# Patient Record
Sex: FEMALE | Race: OTHER | Employment: UNEMPLOYED | URBAN - METROPOLITAN AREA
[De-identification: names, ages, dates, MRNs, and addresses within clinical notes are randomized per-mention and may not be internally consistent; named-entity substitution may affect disease eponyms.]

---

## 2019-02-21 ENCOUNTER — HOSPITAL ENCOUNTER (EMERGENCY)
Age: 64
Discharge: OTHER INSTITUTION | End: 2019-02-22
Attending: EMERGENCY MEDICINE
Payer: MEDICAID

## 2019-02-21 DIAGNOSIS — F20.0 PARANOID SCHIZOPHRENIA (HCC): Primary | ICD-10-CM

## 2019-02-21 LAB
ACETAMINOPHEN LEVEL: <5 UG/ML (ref 10–30)
ALBUMIN SERPL-MCNC: 3.9 G/DL (ref 3.5–4.6)
ALP BLD-CCNC: 149 U/L (ref 40–130)
ALT SERPL-CCNC: 24 U/L (ref 0–33)
AMPHETAMINE SCREEN, URINE: ABNORMAL
ANION GAP SERPL CALCULATED.3IONS-SCNC: 13 MEQ/L (ref 9–15)
AST SERPL-CCNC: 22 U/L (ref 0–35)
BARBITURATE SCREEN URINE: ABNORMAL
BASOPHILS ABSOLUTE: 0.1 K/UL (ref 0–0.2)
BASOPHILS RELATIVE PERCENT: 0.7 %
BENZODIAZEPINE SCREEN, URINE: POSITIVE
BILIRUB SERPL-MCNC: <0.2 MG/DL (ref 0.2–0.7)
BILIRUBIN URINE: NEGATIVE
BLOOD, URINE: NEGATIVE
BUN BLDV-MCNC: 29 MG/DL (ref 8–23)
CALCIUM SERPL-MCNC: 9.7 MG/DL (ref 8.5–9.9)
CANNABINOID SCREEN URINE: ABNORMAL
CHLORIDE BLD-SCNC: 98 MEQ/L (ref 95–107)
CLARITY: CLEAR
CO2: 24 MEQ/L (ref 20–31)
COCAINE METABOLITE SCREEN URINE: ABNORMAL
COLOR: YELLOW
CREAT SERPL-MCNC: 0.79 MG/DL (ref 0.5–0.9)
EKG ATRIAL RATE: 82 BPM
EKG P AXIS: 21 DEGREES
EKG P-R INTERVAL: 200 MS
EKG Q-T INTERVAL: 380 MS
EKG QRS DURATION: 88 MS
EKG QTC CALCULATION (BAZETT): 443 MS
EKG R AXIS: 20 DEGREES
EKG T AXIS: 2 DEGREES
EKG VENTRICULAR RATE: 82 BPM
EOSINOPHILS ABSOLUTE: 0.3 K/UL (ref 0–0.7)
EOSINOPHILS RELATIVE PERCENT: 3.2 %
ETHANOL PERCENT: NORMAL G/DL
ETHANOL: <10 MG/DL (ref 0–0.08)
GFR AFRICAN AMERICAN: >60
GFR NON-AFRICAN AMERICAN: >60
GLOBULIN: 3.9 G/DL (ref 2.3–3.5)
GLUCOSE BLD-MCNC: 170 MG/DL (ref 70–99)
GLUCOSE URINE: 250 MG/DL
HCT VFR BLD CALC: 37.1 % (ref 37–47)
HEMOGLOBIN: 12.5 G/DL (ref 12–16)
KETONES, URINE: NEGATIVE MG/DL
LEUKOCYTE ESTERASE, URINE: NEGATIVE
LYMPHOCYTES ABSOLUTE: 3.9 K/UL (ref 1–4.8)
LYMPHOCYTES RELATIVE PERCENT: 36.1 %
Lab: ABNORMAL
MCH RBC QN AUTO: 25.2 PG (ref 27–31.3)
MCHC RBC AUTO-ENTMCNC: 33.8 % (ref 33–37)
MCV RBC AUTO: 74.3 FL (ref 82–100)
MONOCYTES ABSOLUTE: 0.7 K/UL (ref 0.2–0.8)
MONOCYTES RELATIVE PERCENT: 6.2 %
NEUTROPHILS ABSOLUTE: 5.8 K/UL (ref 1.4–6.5)
NEUTROPHILS RELATIVE PERCENT: 53.8 %
NITRITE, URINE: NEGATIVE
OPIATE SCREEN URINE: ABNORMAL
PDW BLD-RTO: 17.7 % (ref 11.5–14.5)
PH UA: 6 (ref 5–9)
PHENCYCLIDINE SCREEN URINE: ABNORMAL
PLATELET # BLD: 255 K/UL (ref 130–400)
POTASSIUM SERPL-SCNC: 5.1 MEQ/L (ref 3.4–4.9)
PROTEIN UA: NEGATIVE MG/DL
RBC # BLD: 4.99 M/UL (ref 4.2–5.4)
SALICYLATE, SERUM: <0.3 MG/DL (ref 15–30)
SODIUM BLD-SCNC: 135 MEQ/L (ref 135–144)
SPECIFIC GRAVITY UA: 1.02 (ref 1–1.03)
TOTAL CK: 71 U/L (ref 0–170)
TOTAL PROTEIN: 7.8 G/DL (ref 6.3–8)
URINE REFLEX TO CULTURE: ABNORMAL
UROBILINOGEN, URINE: 0.2 E.U./DL
WBC # BLD: 10.7 K/UL (ref 4.8–10.8)

## 2019-02-21 PROCEDURE — G0480 DRUG TEST DEF 1-7 CLASSES: HCPCS

## 2019-02-21 PROCEDURE — 80307 DRUG TEST PRSMV CHEM ANLYZR: CPT

## 2019-02-21 PROCEDURE — 96372 THER/PROPH/DIAG INJ SC/IM: CPT

## 2019-02-21 PROCEDURE — 82550 ASSAY OF CK (CPK): CPT

## 2019-02-21 PROCEDURE — 93005 ELECTROCARDIOGRAM TRACING: CPT

## 2019-02-21 PROCEDURE — 81003 URINALYSIS AUTO W/O SCOPE: CPT

## 2019-02-21 PROCEDURE — 85025 COMPLETE CBC W/AUTO DIFF WBC: CPT

## 2019-02-21 PROCEDURE — 36415 COLL VENOUS BLD VENIPUNCTURE: CPT

## 2019-02-21 PROCEDURE — 6360000002 HC RX W HCPCS: Performed by: EMERGENCY MEDICINE

## 2019-02-21 PROCEDURE — 80053 COMPREHEN METABOLIC PANEL: CPT

## 2019-02-21 PROCEDURE — 99285 EMERGENCY DEPT VISIT HI MDM: CPT

## 2019-02-21 RX ORDER — CLONAZEPAM 1 MG/1
1 TABLET ORAL 3 TIMES DAILY PRN
Status: ON HOLD | COMMUNITY
End: 2019-02-28 | Stop reason: HOSPADM

## 2019-02-21 RX ORDER — QUETIAPINE FUMARATE 50 MG/1
50 TABLET, FILM COATED ORAL
Status: ON HOLD | COMMUNITY
End: 2019-02-28 | Stop reason: HOSPADM

## 2019-02-21 RX ORDER — LORAZEPAM 2 MG/ML
1.5 INJECTION INTRAMUSCULAR ONCE
Status: COMPLETED | OUTPATIENT
Start: 2019-02-21 | End: 2019-02-21

## 2019-02-21 RX ORDER — METOPROLOL SUCCINATE 50 MG/1
50 TABLET, EXTENDED RELEASE ORAL DAILY
Status: ON HOLD | COMMUNITY
End: 2019-02-25 | Stop reason: HOSPADM

## 2019-02-21 RX ORDER — SIMVASTATIN 40 MG
40 TABLET ORAL DAILY
COMMUNITY
End: 2019-09-20

## 2019-02-21 RX ORDER — QUETIAPINE FUMARATE 200 MG/1
200 TABLET, FILM COATED ORAL
Status: ON HOLD | COMMUNITY
End: 2019-02-28 | Stop reason: HOSPADM

## 2019-02-21 RX ORDER — ACETAMINOPHEN AND CODEINE PHOSPHATE 120; 12 MG/5ML; MG/5ML
30 SOLUTION ORAL NIGHTLY PRN
Status: ON HOLD | COMMUNITY
End: 2019-02-28 | Stop reason: HOSPADM

## 2019-02-21 RX ORDER — LEVOTHYROXINE SODIUM 0.05 MG/1
50 TABLET ORAL DAILY
COMMUNITY
End: 2020-09-21 | Stop reason: SDUPTHER

## 2019-02-21 RX ORDER — LISINOPRIL 20 MG/1
20 TABLET ORAL 2 TIMES DAILY
Status: ON HOLD | COMMUNITY
End: 2019-02-25 | Stop reason: HOSPADM

## 2019-02-21 RX ORDER — DILTIAZEM HYDROCHLORIDE 240 MG/1
240 CAPSULE, EXTENDED RELEASE ORAL DAILY
Status: ON HOLD | COMMUNITY
End: 2019-02-25 | Stop reason: HOSPADM

## 2019-02-21 RX ORDER — SERTRALINE HYDROCHLORIDE 100 MG/1
100 TABLET, FILM COATED ORAL DAILY
Status: ON HOLD | COMMUNITY
End: 2019-02-28 | Stop reason: HOSPADM

## 2019-02-21 RX ORDER — INSULIN GLARGINE 100 [IU]/ML
50 INJECTION, SOLUTION SUBCUTANEOUS NIGHTLY
Status: ON HOLD | COMMUNITY
End: 2019-02-25 | Stop reason: HOSPADM

## 2019-02-21 RX ORDER — FUROSEMIDE 40 MG/1
40 TABLET ORAL DAILY
Status: ON HOLD | COMMUNITY
End: 2020-09-04 | Stop reason: HOSPADM

## 2019-02-21 RX ORDER — MECLIZINE HYDROCHLORIDE 25 MG/1
25 TABLET ORAL 2 TIMES DAILY
COMMUNITY

## 2019-02-21 RX ORDER — MONTELUKAST SODIUM 10 MG/1
10 TABLET ORAL DAILY
COMMUNITY
End: 2019-09-20

## 2019-02-21 RX ADMIN — LORAZEPAM 1.5 MG: 2 INJECTION INTRAMUSCULAR; INTRAVENOUS at 21:56

## 2019-02-21 ASSESSMENT — ENCOUNTER SYMPTOMS
ABDOMINAL PAIN: 0
CHEST TIGHTNESS: 0
SORE THROAT: 0
SHORTNESS OF BREATH: 0
NAUSEA: 0
EYE PAIN: 0
VOMITING: 0

## 2019-02-22 ENCOUNTER — HOSPITAL ENCOUNTER (INPATIENT)
Age: 64
LOS: 7 days | Discharge: HOME | DRG: 751 | End: 2019-03-01
Attending: PSYCHIATRY & NEUROLOGY | Admitting: PSYCHIATRY & NEUROLOGY
Payer: MEDICAID

## 2019-02-22 VITALS
BODY MASS INDEX: 35.85 KG/M2 | TEMPERATURE: 98.2 F | HEIGHT: 64 IN | WEIGHT: 210 LBS | OXYGEN SATURATION: 97 % | HEART RATE: 97 BPM | DIASTOLIC BLOOD PRESSURE: 48 MMHG | RESPIRATION RATE: 18 BRPM | SYSTOLIC BLOOD PRESSURE: 104 MMHG

## 2019-02-22 DIAGNOSIS — F32.3 SEVERE MAJOR DEPRESSION WITH PSYCHOTIC FEATURES (HCC): Primary | ICD-10-CM

## 2019-02-22 LAB
CHP ED QC CHECK: YES
CHP ED QC CHECK: YES
GLUCOSE BLD-MCNC: 160 MG/DL (ref 60–115)
GLUCOSE BLD-MCNC: 190 MG/DL (ref 60–115)
GLUCOSE BLD-MCNC: 297 MG/DL (ref 60–115)
GLUCOSE BLD-MCNC: 302 MG/DL
GLUCOSE BLD-MCNC: 302 MG/DL (ref 60–115)
GLUCOSE BLD-MCNC: 310 MG/DL (ref 60–115)
METER GLUCOSE: 183 MG/DL (ref 74–99)
METER GLUCOSE: 258 MG/DL (ref 74–99)
METER GLUCOSE: 273 MG/DL (ref 74–99)
PERFORMED ON: ABNORMAL

## 2019-02-22 PROCEDURE — 6370000000 HC RX 637 (ALT 250 FOR IP): Performed by: EMERGENCY MEDICINE

## 2019-02-22 PROCEDURE — 6370000000 HC RX 637 (ALT 250 FOR IP): Performed by: NURSE PRACTITIONER

## 2019-02-22 PROCEDURE — 1240000000 HC EMOTIONAL WELLNESS R&B

## 2019-02-22 PROCEDURE — 96372 THER/PROPH/DIAG INJ SC/IM: CPT

## 2019-02-22 PROCEDURE — 2500000003 HC RX 250 WO HCPCS: Performed by: EMERGENCY MEDICINE

## 2019-02-22 PROCEDURE — 6370000000 HC RX 637 (ALT 250 FOR IP): Performed by: FAMILY MEDICINE

## 2019-02-22 PROCEDURE — 6360000002 HC RX W HCPCS: Performed by: EMERGENCY MEDICINE

## 2019-02-22 PROCEDURE — 82962 GLUCOSE BLOOD TEST: CPT

## 2019-02-22 RX ORDER — MAGNESIUM HYDROXIDE/ALUMINUM HYDROXICE/SIMETHICONE 120; 1200; 1200 MG/30ML; MG/30ML; MG/30ML
30 SUSPENSION ORAL PRN
Status: DISCONTINUED | OUTPATIENT
Start: 2019-02-22 | End: 2019-03-01 | Stop reason: HOSPADM

## 2019-02-22 RX ORDER — LEVOTHYROXINE SODIUM 0.05 MG/1
50 TABLET ORAL DAILY
Status: DISCONTINUED | OUTPATIENT
Start: 2019-02-22 | End: 2019-03-01 | Stop reason: HOSPADM

## 2019-02-22 RX ORDER — HALOPERIDOL 5 MG/ML
2 INJECTION INTRAMUSCULAR EVERY 4 HOURS PRN
Status: DISCONTINUED | OUTPATIENT
Start: 2019-02-22 | End: 2019-03-01 | Stop reason: HOSPADM

## 2019-02-22 RX ORDER — MECLIZINE HCL 12.5 MG/1
25 TABLET ORAL 3 TIMES DAILY
Status: DISCONTINUED | OUTPATIENT
Start: 2019-02-22 | End: 2019-03-01 | Stop reason: HOSPADM

## 2019-02-22 RX ORDER — METOPROLOL SUCCINATE 50 MG/1
50 TABLET, EXTENDED RELEASE ORAL DAILY
Status: DISCONTINUED | OUTPATIENT
Start: 2019-02-22 | End: 2019-03-01 | Stop reason: HOSPADM

## 2019-02-22 RX ORDER — ACETAMINOPHEN 325 MG/1
650 TABLET ORAL EVERY 4 HOURS PRN
Status: DISCONTINUED | OUTPATIENT
Start: 2019-02-22 | End: 2019-03-01 | Stop reason: HOSPADM

## 2019-02-22 RX ORDER — SERTRALINE HYDROCHLORIDE 100 MG/1
100 TABLET, FILM COATED ORAL DAILY
Status: DISCONTINUED | OUTPATIENT
Start: 2019-02-22 | End: 2019-02-23

## 2019-02-22 RX ORDER — SIMVASTATIN 40 MG
40 TABLET ORAL DAILY
Status: DISCONTINUED | OUTPATIENT
Start: 2019-02-22 | End: 2019-03-01 | Stop reason: HOSPADM

## 2019-02-22 RX ORDER — LORAZEPAM 2 MG/ML
2 INJECTION INTRAMUSCULAR ONCE
Status: COMPLETED | OUTPATIENT
Start: 2019-02-22 | End: 2019-02-22

## 2019-02-22 RX ORDER — DEXTROSE MONOHYDRATE 25 G/50ML
12.5 INJECTION, SOLUTION INTRAVENOUS PRN
Status: DISCONTINUED | OUTPATIENT
Start: 2019-02-22 | End: 2019-03-01 | Stop reason: HOSPADM

## 2019-02-22 RX ORDER — DEXTROSE MONOHYDRATE 50 MG/ML
100 INJECTION, SOLUTION INTRAVENOUS PRN
Status: DISCONTINUED | OUTPATIENT
Start: 2019-02-22 | End: 2019-03-01 | Stop reason: HOSPADM

## 2019-02-22 RX ORDER — MONTELUKAST SODIUM 10 MG/1
10 TABLET ORAL DAILY
Status: DISCONTINUED | OUTPATIENT
Start: 2019-02-22 | End: 2019-03-01 | Stop reason: HOSPADM

## 2019-02-22 RX ORDER — OLANZAPINE 10 MG/1
5 INJECTION, POWDER, LYOPHILIZED, FOR SOLUTION INTRAMUSCULAR
Status: COMPLETED | OUTPATIENT
Start: 2019-02-22 | End: 2019-02-22

## 2019-02-22 RX ORDER — LORAZEPAM 0.5 MG/1
0.5 TABLET ORAL EVERY 8 HOURS PRN
Status: DISCONTINUED | OUTPATIENT
Start: 2019-02-22 | End: 2019-02-25 | Stop reason: ALTCHOICE

## 2019-02-22 RX ORDER — METOPROLOL SUCCINATE 25 MG/1
25 TABLET, EXTENDED RELEASE ORAL DAILY
Status: DISCONTINUED | OUTPATIENT
Start: 2019-02-22 | End: 2019-03-01 | Stop reason: HOSPADM

## 2019-02-22 RX ORDER — QUETIAPINE FUMARATE 200 MG/1
200 TABLET, FILM COATED ORAL NIGHTLY
Status: DISCONTINUED | OUTPATIENT
Start: 2019-02-22 | End: 2019-02-27

## 2019-02-22 RX ORDER — METOPROLOL SUCCINATE 25 MG/1
50 TABLET, EXTENDED RELEASE ORAL DAILY
Status: DISCONTINUED | OUTPATIENT
Start: 2019-02-22 | End: 2019-02-22

## 2019-02-22 RX ORDER — INSULIN GLARGINE 100 [IU]/ML
30 INJECTION, SOLUTION SUBCUTANEOUS NIGHTLY
Status: CANCELLED | OUTPATIENT
Start: 2019-02-22

## 2019-02-22 RX ORDER — FUROSEMIDE 40 MG/1
40 TABLET ORAL 2 TIMES DAILY
Status: DISCONTINUED | OUTPATIENT
Start: 2019-02-22 | End: 2019-03-01 | Stop reason: HOSPADM

## 2019-02-22 RX ORDER — NICOTINE POLACRILEX 4 MG
15 LOZENGE BUCCAL PRN
Status: DISCONTINUED | OUTPATIENT
Start: 2019-02-22 | End: 2019-03-01 | Stop reason: HOSPADM

## 2019-02-22 RX ORDER — DILTIAZEM HYDROCHLORIDE 120 MG/1
240 CAPSULE, COATED, EXTENDED RELEASE ORAL DAILY
Status: DISCONTINUED | OUTPATIENT
Start: 2019-02-22 | End: 2019-02-22

## 2019-02-22 RX ADMIN — SERTRALINE 100 MG: 100 TABLET, FILM COATED ORAL at 14:58

## 2019-02-22 RX ADMIN — SIMVASTATIN 40 MG: 40 TABLET, FILM COATED ORAL at 14:58

## 2019-02-22 RX ADMIN — MONTELUKAST SODIUM 10 MG: 10 TABLET, FILM COATED ORAL at 14:58

## 2019-02-22 RX ADMIN — MECLIZINE 25 MG: 12.5 TABLET ORAL at 14:57

## 2019-02-22 RX ADMIN — METOPROLOL SUCCINATE 50 MG: 50 TABLET, EXTENDED RELEASE ORAL at 18:24

## 2019-02-22 RX ADMIN — METOPROLOL SUCCINATE 25 MG: 25 TABLET, EXTENDED RELEASE ORAL at 18:24

## 2019-02-22 RX ADMIN — QUETIAPINE FUMARATE 200 MG: 200 TABLET ORAL at 21:15

## 2019-02-22 RX ADMIN — INSULIN HUMAN 10 UNITS: 100 INJECTION, SOLUTION PARENTERAL at 02:15

## 2019-02-22 RX ADMIN — LORAZEPAM 2 MG: 2 INJECTION INTRAMUSCULAR; INTRAVENOUS at 02:24

## 2019-02-22 RX ADMIN — MECLIZINE 25 MG: 12.5 TABLET ORAL at 21:15

## 2019-02-22 RX ADMIN — LEVOTHYROXINE SODIUM 50 MCG: 50 TABLET ORAL at 14:58

## 2019-02-22 RX ADMIN — OLANZAPINE 5 MG: 10 INJECTION, POWDER, LYOPHILIZED, FOR SOLUTION INTRAMUSCULAR at 02:23

## 2019-02-22 RX ADMIN — INSULIN LISPRO 3 UNITS: 100 INJECTION, SOLUTION INTRAVENOUS; SUBCUTANEOUS at 18:01

## 2019-02-22 ASSESSMENT — SLEEP AND FATIGUE QUESTIONNAIRES
DIFFICULTY STAYING ASLEEP: YES
DO YOU HAVE DIFFICULTY SLEEPING: YES
AVERAGE NUMBER OF SLEEP HOURS: 2
RESTFUL SLEEP: NO
DIFFICULTY FALLING ASLEEP: YES
SLEEP PATTERN: DIFFICULTY FALLING ASLEEP;DIFFICULTY ARISING;INSOMNIA;RESTLESSNESS;NIGHTMARES/TERRORS
DO YOU USE A SLEEP AID: NO
DIFFICULTY ARISING: YES

## 2019-02-22 ASSESSMENT — LIFESTYLE VARIABLES: HISTORY_ALCOHOL_USE: NO

## 2019-02-22 ASSESSMENT — PAIN SCALES - GENERAL: PAINLEVEL_OUTOF10: 0

## 2019-02-22 ASSESSMENT — PAIN - FUNCTIONAL ASSESSMENT: PAIN_FUNCTIONAL_ASSESSMENT: 0-10

## 2019-02-23 LAB
ANION GAP SERPL CALCULATED.3IONS-SCNC: 9 MMOL/L (ref 7–16)
BUN BLDV-MCNC: 24 MG/DL (ref 8–23)
CALCIUM SERPL-MCNC: 9.3 MG/DL (ref 8.6–10.2)
CHLORIDE BLD-SCNC: 102 MMOL/L (ref 98–107)
CO2: 26 MMOL/L (ref 22–29)
CREAT SERPL-MCNC: 1.1 MG/DL (ref 0.5–1)
GFR AFRICAN AMERICAN: >60
GFR NON-AFRICAN AMERICAN: 50 ML/MIN/1.73
GLUCOSE BLD-MCNC: 314 MG/DL (ref 74–99)
HBA1C MFR BLD: 8.5 % (ref 4–5.6)
METER GLUCOSE: 104 MG/DL (ref 74–99)
METER GLUCOSE: 188 MG/DL (ref 74–99)
METER GLUCOSE: 206 MG/DL (ref 74–99)
METER GLUCOSE: 212 MG/DL (ref 74–99)
POTASSIUM REFLEX MAGNESIUM: 5 MMOL/L (ref 3.5–5)
SODIUM BLD-SCNC: 137 MMOL/L (ref 132–146)

## 2019-02-23 PROCEDURE — 83036 HEMOGLOBIN GLYCOSYLATED A1C: CPT

## 2019-02-23 PROCEDURE — 80048 BASIC METABOLIC PNL TOTAL CA: CPT

## 2019-02-23 PROCEDURE — 6370000000 HC RX 637 (ALT 250 FOR IP): Performed by: NURSE PRACTITIONER

## 2019-02-23 PROCEDURE — 6370000000 HC RX 637 (ALT 250 FOR IP): Performed by: FAMILY MEDICINE

## 2019-02-23 PROCEDURE — 1240000000 HC EMOTIONAL WELLNESS R&B

## 2019-02-23 PROCEDURE — 36415 COLL VENOUS BLD VENIPUNCTURE: CPT

## 2019-02-23 PROCEDURE — 99222 1ST HOSP IP/OBS MODERATE 55: CPT | Performed by: NURSE PRACTITIONER

## 2019-02-23 PROCEDURE — 82962 GLUCOSE BLOOD TEST: CPT

## 2019-02-23 RX ORDER — QUETIAPINE FUMARATE 25 MG/1
50 TABLET, FILM COATED ORAL NIGHTLY
Status: DISCONTINUED | OUTPATIENT
Start: 2019-02-23 | End: 2019-02-26

## 2019-02-23 RX ORDER — QUETIAPINE FUMARATE 25 MG/1
25 TABLET, FILM COATED ORAL DAILY
Status: DISCONTINUED | OUTPATIENT
Start: 2019-02-23 | End: 2019-02-26

## 2019-02-23 RX ORDER — INSULIN GLARGINE 100 [IU]/ML
30 INJECTION, SOLUTION SUBCUTANEOUS DAILY
Status: DISCONTINUED | OUTPATIENT
Start: 2019-02-23 | End: 2019-02-24

## 2019-02-23 RX ADMIN — SERTRALINE 100 MG: 100 TABLET, FILM COATED ORAL at 08:57

## 2019-02-23 RX ADMIN — QUETIAPINE FUMARATE 50 MG: 25 TABLET ORAL at 20:34

## 2019-02-23 RX ADMIN — METOPROLOL SUCCINATE 25 MG: 25 TABLET, EXTENDED RELEASE ORAL at 08:56

## 2019-02-23 RX ADMIN — SIMVASTATIN 40 MG: 40 TABLET, FILM COATED ORAL at 08:57

## 2019-02-23 RX ADMIN — METFORMIN HYDROCHLORIDE 1000 MG: 1000 TABLET ORAL at 08:56

## 2019-02-23 RX ADMIN — MONTELUKAST SODIUM 10 MG: 10 TABLET, FILM COATED ORAL at 08:56

## 2019-02-23 RX ADMIN — MECLIZINE 25 MG: 12.5 TABLET ORAL at 08:57

## 2019-02-23 RX ADMIN — INSULIN GLARGINE 30 UNITS: 100 INJECTION, SOLUTION SUBCUTANEOUS at 10:03

## 2019-02-23 RX ADMIN — INSULIN LISPRO 2 UNITS: 100 INJECTION, SOLUTION INTRAVENOUS; SUBCUTANEOUS at 12:42

## 2019-02-23 RX ADMIN — FUROSEMIDE 40 MG: 40 TABLET ORAL at 08:56

## 2019-02-23 RX ADMIN — MECLIZINE 25 MG: 12.5 TABLET ORAL at 15:54

## 2019-02-23 RX ADMIN — LEVOTHYROXINE SODIUM 50 MCG: 50 TABLET ORAL at 06:24

## 2019-02-23 RX ADMIN — INSULIN LISPRO 5 UNITS: 100 INJECTION, SOLUTION INTRAVENOUS; SUBCUTANEOUS at 12:42

## 2019-02-23 RX ADMIN — INSULIN LISPRO 1 UNITS: 100 INJECTION, SOLUTION INTRAVENOUS; SUBCUTANEOUS at 08:38

## 2019-02-23 RX ADMIN — MECLIZINE 25 MG: 12.5 TABLET ORAL at 20:34

## 2019-02-23 RX ADMIN — METOPROLOL SUCCINATE 50 MG: 50 TABLET, EXTENDED RELEASE ORAL at 08:57

## 2019-02-23 RX ADMIN — QUETIAPINE FUMARATE 200 MG: 200 TABLET ORAL at 20:34

## 2019-02-23 RX ADMIN — METFORMIN HYDROCHLORIDE 1000 MG: 1000 TABLET ORAL at 17:51

## 2019-02-23 RX ADMIN — QUETIAPINE FUMARATE 25 MG: 25 TABLET ORAL at 12:21

## 2019-02-23 ASSESSMENT — SLEEP AND FATIGUE QUESTIONNAIRES
DIFFICULTY STAYING ASLEEP: YES
SLEEP PATTERN: DIFFICULTY FALLING ASLEEP;DIFFICULTY ARISING;INSOMNIA;RESTLESSNESS;NIGHTMARES/TERRORS
DIFFICULTY ARISING: YES
DO YOU USE A SLEEP AID: NO
DIFFICULTY FALLING ASLEEP: YES
AVERAGE NUMBER OF SLEEP HOURS: 2
DO YOU HAVE DIFFICULTY SLEEPING: YES
RESTFUL SLEEP: NO

## 2019-02-23 ASSESSMENT — PAIN SCALES - GENERAL
PAINLEVEL_OUTOF10: 0
PAINLEVEL_OUTOF10: 0

## 2019-02-23 ASSESSMENT — LIFESTYLE VARIABLES: HISTORY_ALCOHOL_USE: NO

## 2019-02-24 LAB
METER GLUCOSE: 112 MG/DL (ref 74–99)
METER GLUCOSE: 136 MG/DL (ref 74–99)
METER GLUCOSE: 162 MG/DL (ref 74–99)
METER GLUCOSE: 183 MG/DL (ref 74–99)
METER GLUCOSE: 81 MG/DL (ref 74–99)
METER GLUCOSE: 87 MG/DL (ref 74–99)

## 2019-02-24 PROCEDURE — 6370000000 HC RX 637 (ALT 250 FOR IP): Performed by: FAMILY MEDICINE

## 2019-02-24 PROCEDURE — 82962 GLUCOSE BLOOD TEST: CPT

## 2019-02-24 PROCEDURE — 1240000000 HC EMOTIONAL WELLNESS R&B

## 2019-02-24 PROCEDURE — 99231 SBSQ HOSP IP/OBS SF/LOW 25: CPT | Performed by: NURSE PRACTITIONER

## 2019-02-24 PROCEDURE — 6370000000 HC RX 637 (ALT 250 FOR IP): Performed by: NURSE PRACTITIONER

## 2019-02-24 RX ORDER — INSULIN GLARGINE 100 [IU]/ML
35 INJECTION, SOLUTION SUBCUTANEOUS DAILY
Status: DISCONTINUED | OUTPATIENT
Start: 2019-02-25 | End: 2019-03-01 | Stop reason: HOSPADM

## 2019-02-24 RX ADMIN — METOPROLOL SUCCINATE 50 MG: 50 TABLET, EXTENDED RELEASE ORAL at 09:11

## 2019-02-24 RX ADMIN — QUETIAPINE FUMARATE 50 MG: 25 TABLET ORAL at 21:20

## 2019-02-24 RX ADMIN — QUETIAPINE FUMARATE 25 MG: 25 TABLET ORAL at 09:11

## 2019-02-24 RX ADMIN — INSULIN GLARGINE 30 UNITS: 100 INJECTION, SOLUTION SUBCUTANEOUS at 10:35

## 2019-02-24 RX ADMIN — MECLIZINE 25 MG: 12.5 TABLET ORAL at 21:20

## 2019-02-24 RX ADMIN — METFORMIN HYDROCHLORIDE 1000 MG: 1000 TABLET ORAL at 09:10

## 2019-02-24 RX ADMIN — METFORMIN HYDROCHLORIDE 1000 MG: 1000 TABLET ORAL at 18:09

## 2019-02-24 RX ADMIN — MONTELUKAST SODIUM 10 MG: 10 TABLET, FILM COATED ORAL at 09:11

## 2019-02-24 RX ADMIN — FUROSEMIDE 40 MG: 40 TABLET ORAL at 18:09

## 2019-02-24 RX ADMIN — MECLIZINE 25 MG: 12.5 TABLET ORAL at 09:10

## 2019-02-24 RX ADMIN — INSULIN LISPRO 5 UNITS: 100 INJECTION, SOLUTION INTRAVENOUS; SUBCUTANEOUS at 10:36

## 2019-02-24 RX ADMIN — INSULIN LISPRO 1 UNITS: 100 INJECTION, SOLUTION INTRAVENOUS; SUBCUTANEOUS at 10:35

## 2019-02-24 RX ADMIN — LEVOTHYROXINE SODIUM 50 MCG: 50 TABLET ORAL at 06:49

## 2019-02-24 RX ADMIN — SIMVASTATIN 40 MG: 40 TABLET, FILM COATED ORAL at 09:11

## 2019-02-24 RX ADMIN — QUETIAPINE FUMARATE 200 MG: 200 TABLET ORAL at 21:19

## 2019-02-24 RX ADMIN — SERTRALINE 50 MG: 50 TABLET, FILM COATED ORAL at 09:10

## 2019-02-24 RX ADMIN — MECLIZINE 25 MG: 12.5 TABLET ORAL at 13:39

## 2019-02-24 ASSESSMENT — PAIN SCALES - GENERAL: PAINLEVEL_OUTOF10: 0

## 2019-02-25 PROBLEM — Z79.4 TYPE 2 DIABETES MELLITUS WITH HYPERGLYCEMIA, WITH LONG-TERM CURRENT USE OF INSULIN (HCC): Status: ACTIVE | Noted: 2019-02-25

## 2019-02-25 PROBLEM — I10 HTN (HYPERTENSION): Status: ACTIVE | Noted: 2019-02-25

## 2019-02-25 PROBLEM — E78.5 HLD (HYPERLIPIDEMIA): Status: ACTIVE | Noted: 2019-02-25

## 2019-02-25 PROBLEM — I50.9 HF (HEART FAILURE) (HCC): Status: ACTIVE | Noted: 2019-02-25

## 2019-02-25 PROBLEM — E11.65 TYPE 2 DIABETES MELLITUS WITH HYPERGLYCEMIA, WITH LONG-TERM CURRENT USE OF INSULIN (HCC): Status: ACTIVE | Noted: 2019-02-25

## 2019-02-25 PROBLEM — E03.9 HYPOTHYROID: Status: ACTIVE | Noted: 2019-02-25

## 2019-02-25 LAB
METER GLUCOSE: 132 MG/DL (ref 74–99)
METER GLUCOSE: 132 MG/DL (ref 74–99)
METER GLUCOSE: 90 MG/DL (ref 74–99)

## 2019-02-25 PROCEDURE — 82962 GLUCOSE BLOOD TEST: CPT

## 2019-02-25 PROCEDURE — 6370000000 HC RX 637 (ALT 250 FOR IP): Performed by: NURSE PRACTITIONER

## 2019-02-25 PROCEDURE — 1240000000 HC EMOTIONAL WELLNESS R&B

## 2019-02-25 PROCEDURE — 99231 SBSQ HOSP IP/OBS SF/LOW 25: CPT | Performed by: NURSE PRACTITIONER

## 2019-02-25 PROCEDURE — 6370000000 HC RX 637 (ALT 250 FOR IP): Performed by: FAMILY MEDICINE

## 2019-02-25 RX ORDER — METOPROLOL SUCCINATE 25 MG/1
25 TABLET, EXTENDED RELEASE ORAL DAILY
Qty: 30 TABLET | Refills: 3 | Status: ON HOLD | OUTPATIENT
Start: 2019-02-25 | End: 2020-02-11

## 2019-02-25 RX ORDER — LORAZEPAM 0.5 MG/1
0.5 TABLET ORAL EVERY 4 HOURS PRN
Status: DISCONTINUED | OUTPATIENT
Start: 2019-02-25 | End: 2019-02-26

## 2019-02-25 RX ORDER — METOPROLOL SUCCINATE 50 MG/1
50 TABLET, EXTENDED RELEASE ORAL DAILY
Qty: 30 TABLET | Refills: 3 | Status: ON HOLD | OUTPATIENT
Start: 2019-02-25 | End: 2020-02-26

## 2019-02-25 RX ORDER — INSULIN GLARGINE 100 [IU]/ML
35 INJECTION, SOLUTION SUBCUTANEOUS DAILY
Qty: 1 VIAL | Refills: 3 | Status: SHIPPED | OUTPATIENT
Start: 2019-02-25 | End: 2020-01-02 | Stop reason: SDUPTHER

## 2019-02-25 RX ADMIN — SERTRALINE 50 MG: 50 TABLET, FILM COATED ORAL at 08:48

## 2019-02-25 RX ADMIN — FUROSEMIDE 40 MG: 40 TABLET ORAL at 17:45

## 2019-02-25 RX ADMIN — MECLIZINE 25 MG: 12.5 TABLET ORAL at 08:48

## 2019-02-25 RX ADMIN — SIMVASTATIN 40 MG: 40 TABLET, FILM COATED ORAL at 08:47

## 2019-02-25 RX ADMIN — METFORMIN HYDROCHLORIDE 1000 MG: 1000 TABLET ORAL at 17:45

## 2019-02-25 RX ADMIN — MONTELUKAST SODIUM 10 MG: 10 TABLET, FILM COATED ORAL at 08:47

## 2019-02-25 RX ADMIN — INSULIN GLARGINE 35 UNITS: 100 INJECTION, SOLUTION SUBCUTANEOUS at 08:52

## 2019-02-25 RX ADMIN — METOPROLOL SUCCINATE 50 MG: 50 TABLET, EXTENDED RELEASE ORAL at 08:57

## 2019-02-25 RX ADMIN — MECLIZINE 25 MG: 12.5 TABLET ORAL at 14:14

## 2019-02-25 RX ADMIN — LORAZEPAM 0.5 MG: 0.5 TABLET ORAL at 05:30

## 2019-02-25 RX ADMIN — INSULIN LISPRO 5 UNITS: 100 INJECTION, SOLUTION INTRAVENOUS; SUBCUTANEOUS at 17:52

## 2019-02-25 RX ADMIN — INSULIN LISPRO 5 UNITS: 100 INJECTION, SOLUTION INTRAVENOUS; SUBCUTANEOUS at 08:53

## 2019-02-25 RX ADMIN — LEVOTHYROXINE SODIUM 50 MCG: 50 TABLET ORAL at 06:46

## 2019-02-25 RX ADMIN — MECLIZINE 25 MG: 12.5 TABLET ORAL at 21:03

## 2019-02-25 RX ADMIN — METOPROLOL SUCCINATE 25 MG: 25 TABLET, EXTENDED RELEASE ORAL at 08:57

## 2019-02-25 RX ADMIN — QUETIAPINE FUMARATE 25 MG: 25 TABLET ORAL at 08:48

## 2019-02-25 RX ADMIN — METFORMIN HYDROCHLORIDE 1000 MG: 1000 TABLET ORAL at 08:48

## 2019-02-25 ASSESSMENT — PAIN SCALES - GENERAL: PAINLEVEL_OUTOF10: 0

## 2019-02-26 LAB
METER GLUCOSE: 108 MG/DL (ref 74–99)
METER GLUCOSE: 123 MG/DL (ref 74–99)
METER GLUCOSE: 148 MG/DL (ref 74–99)
METER GLUCOSE: 86 MG/DL (ref 74–99)
METER GLUCOSE: 95 MG/DL (ref 74–99)

## 2019-02-26 PROCEDURE — 99231 SBSQ HOSP IP/OBS SF/LOW 25: CPT | Performed by: NURSE PRACTITIONER

## 2019-02-26 PROCEDURE — 6370000000 HC RX 637 (ALT 250 FOR IP): Performed by: NURSE PRACTITIONER

## 2019-02-26 PROCEDURE — 6370000000 HC RX 637 (ALT 250 FOR IP): Performed by: FAMILY MEDICINE

## 2019-02-26 PROCEDURE — 82962 GLUCOSE BLOOD TEST: CPT

## 2019-02-26 PROCEDURE — 1240000000 HC EMOTIONAL WELLNESS R&B

## 2019-02-26 RX ORDER — DIAZEPAM 2 MG/1
2 TABLET ORAL 3 TIMES DAILY
Status: DISCONTINUED | OUTPATIENT
Start: 2019-02-26 | End: 2019-02-27

## 2019-02-26 RX ADMIN — MECLIZINE 25 MG: 12.5 TABLET ORAL at 21:22

## 2019-02-26 RX ADMIN — MECLIZINE 25 MG: 12.5 TABLET ORAL at 09:10

## 2019-02-26 RX ADMIN — INSULIN GLARGINE 35 UNITS: 100 INJECTION, SOLUTION SUBCUTANEOUS at 09:16

## 2019-02-26 RX ADMIN — INSULIN LISPRO 5 UNITS: 100 INJECTION, SOLUTION INTRAVENOUS; SUBCUTANEOUS at 18:21

## 2019-02-26 RX ADMIN — DIAZEPAM 2 MG: 2 TABLET ORAL at 10:55

## 2019-02-26 RX ADMIN — MECLIZINE 25 MG: 12.5 TABLET ORAL at 14:29

## 2019-02-26 RX ADMIN — SIMVASTATIN 40 MG: 40 TABLET, FILM COATED ORAL at 09:11

## 2019-02-26 RX ADMIN — DIAZEPAM 2 MG: 2 TABLET ORAL at 21:22

## 2019-02-26 RX ADMIN — MONTELUKAST SODIUM 10 MG: 10 TABLET, FILM COATED ORAL at 09:11

## 2019-02-26 RX ADMIN — METOPROLOL SUCCINATE 25 MG: 25 TABLET, EXTENDED RELEASE ORAL at 09:11

## 2019-02-26 RX ADMIN — METOPROLOL SUCCINATE 50 MG: 50 TABLET, EXTENDED RELEASE ORAL at 09:13

## 2019-02-26 RX ADMIN — METFORMIN HYDROCHLORIDE 1000 MG: 1000 TABLET ORAL at 17:47

## 2019-02-26 RX ADMIN — QUETIAPINE FUMARATE 25 MG: 25 TABLET ORAL at 09:11

## 2019-02-26 RX ADMIN — METFORMIN HYDROCHLORIDE 1000 MG: 1000 TABLET ORAL at 09:10

## 2019-02-26 RX ADMIN — INSULIN LISPRO 5 UNITS: 100 INJECTION, SOLUTION INTRAVENOUS; SUBCUTANEOUS at 09:16

## 2019-02-26 RX ADMIN — LEVOTHYROXINE SODIUM 50 MCG: 50 TABLET ORAL at 06:14

## 2019-02-26 RX ADMIN — INSULIN LISPRO 1 UNITS: 100 INJECTION, SOLUTION INTRAVENOUS; SUBCUTANEOUS at 18:23

## 2019-02-26 RX ADMIN — SERTRALINE 50 MG: 50 TABLET, FILM COATED ORAL at 09:10

## 2019-02-26 ASSESSMENT — PAIN SCALES - GENERAL
PAINLEVEL_OUTOF10: 0
PAINLEVEL_OUTOF10: 0

## 2019-02-27 LAB
METER GLUCOSE: 102 MG/DL (ref 74–99)
METER GLUCOSE: 121 MG/DL (ref 74–99)
METER GLUCOSE: 147 MG/DL (ref 74–99)
METER GLUCOSE: 88 MG/DL (ref 74–99)

## 2019-02-27 PROCEDURE — 6370000000 HC RX 637 (ALT 250 FOR IP): Performed by: NURSE PRACTITIONER

## 2019-02-27 PROCEDURE — 1240000000 HC EMOTIONAL WELLNESS R&B

## 2019-02-27 PROCEDURE — 82962 GLUCOSE BLOOD TEST: CPT

## 2019-02-27 PROCEDURE — 6370000000 HC RX 637 (ALT 250 FOR IP): Performed by: FAMILY MEDICINE

## 2019-02-27 PROCEDURE — 99231 SBSQ HOSP IP/OBS SF/LOW 25: CPT | Performed by: NURSE PRACTITIONER

## 2019-02-27 RX ORDER — DIAZEPAM 5 MG/1
5 TABLET ORAL 3 TIMES DAILY
Status: DISCONTINUED | OUTPATIENT
Start: 2019-02-27 | End: 2019-03-01 | Stop reason: HOSPADM

## 2019-02-27 RX ADMIN — MECLIZINE 25 MG: 12.5 TABLET ORAL at 09:58

## 2019-02-27 RX ADMIN — LEVOTHYROXINE SODIUM 50 MCG: 50 TABLET ORAL at 06:12

## 2019-02-27 RX ADMIN — METOPROLOL SUCCINATE 50 MG: 50 TABLET, EXTENDED RELEASE ORAL at 09:58

## 2019-02-27 RX ADMIN — METOPROLOL SUCCINATE 25 MG: 25 TABLET, EXTENDED RELEASE ORAL at 09:58

## 2019-02-27 RX ADMIN — MECLIZINE 25 MG: 12.5 TABLET ORAL at 13:28

## 2019-02-27 RX ADMIN — MECLIZINE 25 MG: 12.5 TABLET ORAL at 20:37

## 2019-02-27 RX ADMIN — DIAZEPAM 5 MG: 5 TABLET ORAL at 13:28

## 2019-02-27 RX ADMIN — METFORMIN HYDROCHLORIDE 1000 MG: 1000 TABLET ORAL at 18:24

## 2019-02-27 RX ADMIN — SERTRALINE 50 MG: 50 TABLET, FILM COATED ORAL at 09:58

## 2019-02-27 RX ADMIN — INSULIN GLARGINE 35 UNITS: 100 INJECTION, SOLUTION SUBCUTANEOUS at 10:03

## 2019-02-27 RX ADMIN — SIMVASTATIN 40 MG: 40 TABLET, FILM COATED ORAL at 09:58

## 2019-02-27 RX ADMIN — DIAZEPAM 2 MG: 2 TABLET ORAL at 09:58

## 2019-02-27 RX ADMIN — METFORMIN HYDROCHLORIDE 1000 MG: 1000 TABLET ORAL at 09:59

## 2019-02-27 RX ADMIN — INSULIN LISPRO 5 UNITS: 100 INJECTION, SOLUTION INTRAVENOUS; SUBCUTANEOUS at 13:30

## 2019-02-27 RX ADMIN — INSULIN LISPRO 1 UNITS: 100 INJECTION, SOLUTION INTRAVENOUS; SUBCUTANEOUS at 13:29

## 2019-02-27 RX ADMIN — MONTELUKAST SODIUM 10 MG: 10 TABLET, FILM COATED ORAL at 09:58

## 2019-02-27 RX ADMIN — DIAZEPAM 5 MG: 5 TABLET ORAL at 20:37

## 2019-02-27 ASSESSMENT — PAIN SCALES - GENERAL
PAINLEVEL_OUTOF10: 0

## 2019-02-28 LAB
METER GLUCOSE: 119 MG/DL (ref 74–99)
METER GLUCOSE: 126 MG/DL (ref 74–99)
METER GLUCOSE: 149 MG/DL (ref 74–99)
METER GLUCOSE: 92 MG/DL (ref 74–99)

## 2019-02-28 PROCEDURE — 6370000000 HC RX 637 (ALT 250 FOR IP): Performed by: NURSE PRACTITIONER

## 2019-02-28 PROCEDURE — 99231 SBSQ HOSP IP/OBS SF/LOW 25: CPT | Performed by: NURSE PRACTITIONER

## 2019-02-28 PROCEDURE — 6370000000 HC RX 637 (ALT 250 FOR IP): Performed by: CLINICAL NURSE SPECIALIST

## 2019-02-28 PROCEDURE — 82962 GLUCOSE BLOOD TEST: CPT

## 2019-02-28 PROCEDURE — 6370000000 HC RX 637 (ALT 250 FOR IP): Performed by: FAMILY MEDICINE

## 2019-02-28 PROCEDURE — 1240000000 HC EMOTIONAL WELLNESS R&B

## 2019-02-28 RX ORDER — DIAZEPAM 5 MG/1
5 TABLET ORAL 3 TIMES DAILY
Qty: 30 TABLET | Refills: 0 | Status: SHIPPED | OUTPATIENT
Start: 2019-02-28 | End: 2019-03-10

## 2019-02-28 RX ADMIN — MECLIZINE 25 MG: 12.5 TABLET ORAL at 13:02

## 2019-02-28 RX ADMIN — SERTRALINE 50 MG: 50 TABLET, FILM COATED ORAL at 10:14

## 2019-02-28 RX ADMIN — MONTELUKAST SODIUM 10 MG: 10 TABLET, FILM COATED ORAL at 10:13

## 2019-02-28 RX ADMIN — INSULIN LISPRO 1 UNITS: 100 INJECTION, SOLUTION INTRAVENOUS; SUBCUTANEOUS at 13:03

## 2019-02-28 RX ADMIN — DIAZEPAM 5 MG: 5 TABLET ORAL at 10:14

## 2019-02-28 RX ADMIN — LEVOTHYROXINE SODIUM 50 MCG: 50 TABLET ORAL at 06:10

## 2019-02-28 RX ADMIN — DIAZEPAM 5 MG: 5 TABLET ORAL at 13:02

## 2019-02-28 RX ADMIN — DIAZEPAM 5 MG: 5 TABLET ORAL at 21:51

## 2019-02-28 RX ADMIN — SIMVASTATIN 40 MG: 40 TABLET, FILM COATED ORAL at 10:13

## 2019-02-28 RX ADMIN — INSULIN GLARGINE 35 UNITS: 100 INJECTION, SOLUTION SUBCUTANEOUS at 10:12

## 2019-02-28 RX ADMIN — INSULIN LISPRO 5 UNITS: 100 INJECTION, SOLUTION INTRAVENOUS; SUBCUTANEOUS at 13:02

## 2019-02-28 RX ADMIN — METFORMIN HYDROCHLORIDE 1000 MG: 1000 TABLET ORAL at 18:10

## 2019-02-28 RX ADMIN — MECLIZINE 25 MG: 12.5 TABLET ORAL at 21:51

## 2019-02-28 RX ADMIN — METFORMIN HYDROCHLORIDE 1000 MG: 1000 TABLET ORAL at 10:13

## 2019-02-28 RX ADMIN — METOPROLOL SUCCINATE 25 MG: 25 TABLET, EXTENDED RELEASE ORAL at 10:14

## 2019-02-28 RX ADMIN — METOPROLOL SUCCINATE 50 MG: 50 TABLET, EXTENDED RELEASE ORAL at 10:16

## 2019-02-28 RX ADMIN — MECLIZINE 25 MG: 12.5 TABLET ORAL at 10:14

## 2019-02-28 ASSESSMENT — PAIN SCALES - GENERAL
PAINLEVEL_OUTOF10: 0
PAINLEVEL_OUTOF10: 0

## 2019-03-01 VITALS
SYSTOLIC BLOOD PRESSURE: 145 MMHG | HEIGHT: 64 IN | OXYGEN SATURATION: 99 % | WEIGHT: 210 LBS | HEART RATE: 75 BPM | TEMPERATURE: 97 F | RESPIRATION RATE: 19 BRPM | DIASTOLIC BLOOD PRESSURE: 75 MMHG | BODY MASS INDEX: 35.85 KG/M2

## 2019-03-01 LAB
METER GLUCOSE: 167 MG/DL (ref 74–99)
METER GLUCOSE: 75 MG/DL (ref 74–99)

## 2019-03-01 PROCEDURE — 82962 GLUCOSE BLOOD TEST: CPT

## 2019-03-01 PROCEDURE — 6370000000 HC RX 637 (ALT 250 FOR IP): Performed by: NURSE PRACTITIONER

## 2019-03-01 PROCEDURE — 99238 HOSP IP/OBS DSCHRG MGMT 30/<: CPT | Performed by: NURSE PRACTITIONER

## 2019-03-01 PROCEDURE — 6370000000 HC RX 637 (ALT 250 FOR IP): Performed by: CLINICAL NURSE SPECIALIST

## 2019-03-01 PROCEDURE — 6370000000 HC RX 637 (ALT 250 FOR IP): Performed by: FAMILY MEDICINE

## 2019-03-01 RX ADMIN — MONTELUKAST SODIUM 10 MG: 10 TABLET, FILM COATED ORAL at 08:53

## 2019-03-01 RX ADMIN — INSULIN LISPRO 5 UNITS: 100 INJECTION, SOLUTION INTRAVENOUS; SUBCUTANEOUS at 12:54

## 2019-03-01 RX ADMIN — INSULIN LISPRO 1 UNITS: 100 INJECTION, SOLUTION INTRAVENOUS; SUBCUTANEOUS at 12:53

## 2019-03-01 RX ADMIN — MECLIZINE 25 MG: 12.5 TABLET ORAL at 08:54

## 2019-03-01 RX ADMIN — METFORMIN HYDROCHLORIDE 1000 MG: 1000 TABLET ORAL at 08:53

## 2019-03-01 RX ADMIN — INSULIN LISPRO 5 UNITS: 100 INJECTION, SOLUTION INTRAVENOUS; SUBCUTANEOUS at 08:56

## 2019-03-01 RX ADMIN — DIAZEPAM 5 MG: 5 TABLET ORAL at 12:56

## 2019-03-01 RX ADMIN — METOPROLOL SUCCINATE 25 MG: 25 TABLET, EXTENDED RELEASE ORAL at 09:04

## 2019-03-01 RX ADMIN — SIMVASTATIN 40 MG: 40 TABLET, FILM COATED ORAL at 08:54

## 2019-03-01 RX ADMIN — LEVOTHYROXINE SODIUM 50 MCG: 50 TABLET ORAL at 06:47

## 2019-03-01 RX ADMIN — INSULIN GLARGINE 35 UNITS: 100 INJECTION, SOLUTION SUBCUTANEOUS at 08:55

## 2019-03-01 RX ADMIN — SERTRALINE 50 MG: 50 TABLET, FILM COATED ORAL at 08:54

## 2019-03-01 RX ADMIN — MECLIZINE 25 MG: 12.5 TABLET ORAL at 12:56

## 2019-03-01 RX ADMIN — METOPROLOL SUCCINATE 50 MG: 50 TABLET, EXTENDED RELEASE ORAL at 09:04

## 2019-03-01 ASSESSMENT — PAIN SCALES - GENERAL: PAINLEVEL_OUTOF10: 0

## 2019-04-07 ENCOUNTER — HOSPITAL ENCOUNTER (EMERGENCY)
Age: 64
Discharge: HOME | End: 2019-04-07
Payer: MEDICARE

## 2019-04-07 ENCOUNTER — APPOINTMENT (OUTPATIENT)
Dept: GENERAL RADIOLOGY | Age: 64
End: 2019-04-07
Payer: MEDICARE

## 2019-04-07 VITALS
HEIGHT: 64 IN | TEMPERATURE: 97.8 F | DIASTOLIC BLOOD PRESSURE: 49 MMHG | BODY MASS INDEX: 18.61 KG/M2 | RESPIRATION RATE: 16 BRPM | SYSTOLIC BLOOD PRESSURE: 141 MMHG | HEART RATE: 81 BPM | OXYGEN SATURATION: 96 % | WEIGHT: 109 LBS

## 2019-04-07 DIAGNOSIS — E11.65 TYPE 2 DIABETES MELLITUS WITH HYPERGLYCEMIA, WITH LONG-TERM CURRENT USE OF INSULIN (HCC): ICD-10-CM

## 2019-04-07 DIAGNOSIS — E87.5 HYPERKALEMIA: ICD-10-CM

## 2019-04-07 DIAGNOSIS — Z79.4 TYPE 2 DIABETES MELLITUS WITH HYPERGLYCEMIA, WITH LONG-TERM CURRENT USE OF INSULIN (HCC): ICD-10-CM

## 2019-04-07 DIAGNOSIS — L97.511 SKIN ULCER OF RIGHT FOOT, LIMITED TO BREAKDOWN OF SKIN (HCC): Primary | ICD-10-CM

## 2019-04-07 LAB
ALBUMIN SERPL-MCNC: 4.1 G/DL (ref 3.5–4.6)
ALP BLD-CCNC: 89 U/L (ref 40–130)
ALT SERPL-CCNC: 9 U/L (ref 0–33)
ANION GAP SERPL CALCULATED.3IONS-SCNC: 11 MEQ/L (ref 9–15)
ANISOCYTOSIS: ABNORMAL
AST SERPL-CCNC: 15 U/L (ref 0–35)
BASOPHILS ABSOLUTE: 0 K/UL (ref 0–0.2)
BASOPHILS RELATIVE PERCENT: 0.4 %
BILIRUB SERPL-MCNC: 0.3 MG/DL (ref 0.2–0.7)
BUN BLDV-MCNC: 38 MG/DL (ref 8–23)
CALCIUM SERPL-MCNC: 9.6 MG/DL (ref 8.5–9.9)
CHLORIDE BLD-SCNC: 98 MEQ/L (ref 95–107)
CO2: 27 MEQ/L (ref 20–31)
CREAT SERPL-MCNC: 1.11 MG/DL (ref 0.5–0.9)
EOSINOPHILS ABSOLUTE: 0.1 K/UL (ref 0–0.7)
EOSINOPHILS RELATIVE PERCENT: 0.9 %
GFR AFRICAN AMERICAN: >60
GFR NON-AFRICAN AMERICAN: 49.9
GLOBULIN: 3.4 G/DL (ref 2.3–3.5)
GLUCOSE BLD-MCNC: 149 MG/DL (ref 70–99)
HCT VFR BLD CALC: 37.2 % (ref 37–47)
HEMOGLOBIN: 12.9 G/DL (ref 12–16)
LACTIC ACID: 1.7 MMOL/L (ref 0.5–2.2)
LYMPHOCYTES ABSOLUTE: 3.3 K/UL (ref 1–4.8)
LYMPHOCYTES RELATIVE PERCENT: 33 %
MCH RBC QN AUTO: 25.7 PG (ref 27–31.3)
MCHC RBC AUTO-ENTMCNC: 34.8 % (ref 33–37)
MCV RBC AUTO: 73.9 FL (ref 82–100)
MICROCYTES: ABNORMAL
MONOCYTES ABSOLUTE: 0.8 K/UL (ref 0.2–0.8)
MONOCYTES RELATIVE PERCENT: 8.2 %
NEUTROPHILS ABSOLUTE: 5.7 K/UL (ref 1.4–6.5)
NEUTROPHILS RELATIVE PERCENT: 57.5 %
PDW BLD-RTO: 16.2 % (ref 11.5–14.5)
PLATELET # BLD: 226 K/UL (ref 130–400)
POTASSIUM SERPL-SCNC: 5.5 MEQ/L (ref 3.4–4.9)
POTASSIUM SERPL-SCNC: 5.7 MEQ/L (ref 3.4–4.9)
RBC # BLD: 5.03 M/UL (ref 4.2–5.4)
SODIUM BLD-SCNC: 136 MEQ/L (ref 135–144)
TOTAL PROTEIN: 7.5 G/DL (ref 6.3–8)
WBC # BLD: 9.9 K/UL (ref 4.8–10.8)

## 2019-04-07 PROCEDURE — 99283 EMERGENCY DEPT VISIT LOW MDM: CPT

## 2019-04-07 PROCEDURE — 85025 COMPLETE CBC W/AUTO DIFF WBC: CPT

## 2019-04-07 PROCEDURE — 84132 ASSAY OF SERUM POTASSIUM: CPT

## 2019-04-07 PROCEDURE — 83605 ASSAY OF LACTIC ACID: CPT

## 2019-04-07 PROCEDURE — 80053 COMPREHEN METABOLIC PANEL: CPT

## 2019-04-07 PROCEDURE — 36415 COLL VENOUS BLD VENIPUNCTURE: CPT

## 2019-04-07 PROCEDURE — 73630 X-RAY EXAM OF FOOT: CPT

## 2019-04-07 RX ORDER — SODIUM POLYSTYRENE SULFONATE 15 G/60ML
15 SUSPENSION ORAL; RECTAL 2 TIMES DAILY
Qty: 120 ML | Refills: 0 | Status: ON HOLD | OUTPATIENT
Start: 2019-04-07 | End: 2020-02-11

## 2019-04-07 RX ORDER — IBUPROFEN 400 MG/1
400 TABLET ORAL EVERY 6 HOURS PRN
Qty: 20 TABLET | Refills: 0 | Status: SHIPPED | OUTPATIENT
Start: 2019-04-07 | End: 2019-09-20

## 2019-04-07 ASSESSMENT — ENCOUNTER SYMPTOMS
VOICE CHANGE: 0
APNEA: 0
PHOTOPHOBIA: 0
ANAL BLEEDING: 0
EYE DISCHARGE: 0
ABDOMINAL DISTENTION: 0
COLOR CHANGE: 1
VOMITING: 0

## 2019-04-07 ASSESSMENT — PAIN SCALES - GENERAL: PAINLEVEL_OUTOF10: 8

## 2019-04-07 ASSESSMENT — PAIN DESCRIPTION - LOCATION: LOCATION: FOOT

## 2019-04-07 ASSESSMENT — PAIN DESCRIPTION - ORIENTATION: ORIENTATION: RIGHT

## 2019-04-07 NOTE — ED PROVIDER NOTES
[address] [facility] ED  eMERGENCY dEPARTMENT eNCOUnter      Pt Name: Juan Marinelli  MRN: [id number]  [place] 1955  Date of evaluation: 4/7/2019  Provider: Kizzy Maher PA-C    CHIEF COMPLAINT       Chief Complaint   Patient presents with    Wound Check     rightfoot has sore on bottom getting worse on going x1 month         HISTORY OF PRESENT ILLNESS   (Location/Symptom, Timing/Onset,Context/Setting, Quality, Duration, Modifying Factors, Severity)  Note limiting factors. Juan Marinelli is a 66 y.o. female who presents to the emergency department presents with 1 month history of right foot ulcer noted between the fourth and third digits with some discoloration patient's notes pain with movement patient's known diabetic. Is moderate severity nothing improves or worsens symptoms. family translates where needed. HPI    NursingNotes were reviewed. REVIEW OF SYSTEMS    (2-9 systems for level 4, 10 or more for level 5)     Review of Systems   Constitutional: Negative for activity change, appetite change, chills, fever and unexpected weight change. HENT: Negative for ear discharge, nosebleeds and voice change. Eyes: Negative for photophobia and discharge. Respiratory: Negative for apnea. Cardiovascular: Negative for chest pain. Gastrointestinal: Negative for abdominal distention, anal bleeding and vomiting. Endocrine: Negative for cold intolerance, heat intolerance and polyphagia. Genitourinary: Negative for hematuria. Musculoskeletal: Positive for arthralgias. Negative for joint swelling. Skin: Positive for color change and wound. Negative for pallor. Allergic/Immunologic: Negative for immunocompromised state. Neurological: Negative for seizures and facial asymmetry. Hematological: Does not bruise/bleed easily. Psychiatric/Behavioral: Negative for behavioral problems, self-injury and sleep disturbance.    All other systems reviewed and are negative. Except as noted above the remainder of the review of systems was reviewed and negative. PAST MEDICAL HISTORY     Past Medical History:   Diagnosis Date    Diabetes mellitus ([address]     Hyperlipidemia     Hypertension          SURGICALHISTORY     History reviewed. No pertinent surgical history. CURRENT MEDICATIONS       Previous Medications    FUROSEMIDE (LASIX) 40 MG TABLET    Take 40 mg by mouth 2 times daily    INSULIN GLARGINE (LANTUS) 100 UNIT/ML INJECTION VIAL    Inject 35 Units into the skin daily    INSULIN LISPRO (HUMALOG) 100 UNIT/ML INJECTION VIAL    Inject 0-6 Units into the skin 3 times daily (with meals)    INSULIN LISPRO (HUMALOG) 100 UNIT/ML INJECTION VIAL    Inject 5 Units into the skin 3 times daily (with meals)    LEVOTHYROXINE (SYNTHROID) 50 MCG TABLET    Take 50 mcg by mouth Daily    MECLIZINE (ANTIVERT) 25 MG TABLET    Take 25 mg by mouth three times daily    METFORMIN (GLUCOPHAGE) 1000 MG TABLET    Take 1,000 mg by mouth every 12 hours    METOPROLOL SUCCINATE (TOPROL XL) 25 MG EXTENDED RELEASE TABLET    Take 1 tablet by mouth daily    METOPROLOL SUCCINATE (TOPROL XL) 50 MG EXTENDED RELEASE TABLET    Take 1 tablet by mouth daily    MONTELUKAST (SINGULAIR) 10 MG TABLET    Take 10 mg by mouth daily    SERTRALINE (ZOLOFT) 50 MG TABLET    Take 1 tablet by mouth daily    SIMVASTATIN (ZOCOR) 40 MG TABLET    Take 40 mg by mouth daily       ALLERGIES     Ambien [zolpidem tartrate]; Capoten [captopril]; Clioquinol; Cogentin [benztropine]; Depakote [divalproex sodium]; Effexor xr [venlafaxine hcl er]; Geodon [ziprasidone hcl]; Navane [thiothixene]; Pamelor [nortriptyline hcl]; Remeron [mirtazapine]; Risperdal [risperidone]; Trazodone and nefazodone; and Wellbutrin [bupropion]    FAMILY HISTORY     History reviewed. No pertinent family history.        SOCIAL HISTORY       Social History     Socioeconomic History    Marital status:      Spouse name: None    Number of children: None    Years of education: None    Highest education level: None   Occupational History    None   Social Needs    Financial resource strain: None    Food insecurity:     Worry: None     Inability: None    Transportation needs:     Medical: None     Non-medical: None   Tobacco Use    Smoking status: Never Smoker    Smokeless tobacco: Never Used   Substance and Sexual Activity    Alcohol use: None    Drug use: None    Sexual activity: None   Lifestyle    Physical activity:     Days per week: None     Minutes per session: None    Stress: None   Relationships    Social connections:     Talks on phone: None     Gets together: None     Attends Shinto service: None     Active member of club or organization: None     Attends meetings of clubs or organizations: None     Relationship status: None    Intimate partner violence:     Fear of current or ex partner: None     Emotionally abused: None     Physically abused: None     Forced sexual activity: None   Other Topics Concern    None   Social History Narrative    None       SCREENINGS      @FLOW([id number])@      PHYSICAL EXAM    (up to 7 for level 4, 8 or more for level 5)     ED Triage Vitals [04/07/19 1402]   BP Temp Temp Source Pulse Resp SpO2 Height Weight   (!) 148/57 97.8 °F (36.6 °C) Oral 87 18 96 % 5' 4\" (1.626 m) 109 lb (49.4 kg)       Physical Exam   Constitutional: She is oriented to person, place, and time. She appears well-developed and well-nourished. No distress. HENT:   Head: Normocephalic and atraumatic. Mouth/Throat: No oropharyngeal exudate. Eyes: Pupils are equal, round, and reactive to light. EOM are normal. Right eye exhibits no discharge. Left eye exhibits no discharge. Neck: Normal range of motion. Neck supple. Cardiovascular: Normal rate, regular rhythm, normal heart sounds and intact distal pulses. Pulmonary/Chest: Effort normal and breath sounds normal. No stridor. No respiratory distress.  She has no wheezes. She has no rales. Abdominal: Soft. Bowel sounds are normal. She exhibits no distension. There is no tenderness. Musculoskeletal: Normal range of motion. She exhibits tenderness. She exhibits no deformity. Neurological: She is alert and oriented to person, place, and time. Skin: Skin is warm. No erythema. Right fourth toe lateral ulcer noted approximately 6 mm. Slight discoloration of toe. Palpable anterior tibial pulses bilateral lower extremities noted. Psychiatric: She has a normal mood and affect. Nursing note and vitals reviewed. DIAGNOSTIC RESULTS     EKG: All EKG's are interpreted by the Emergency Department Physician who either signs or Co-signsthis chart in the absence of a cardiologist.         RADIOLOGY:   Rosiland Settle such as CT, Ultrasound and MRI are read by the radiologist. Plain radiographic images are visualized and preliminarily interpreted by the emergency physician with the below findings:         Interpretation per the Radiologist below, if available at the time ofthis note:    XR FOOT RIGHT (MIN 3 VIEWS)   Final Result   NO ACUTE FRACTURES.    IF THERE IS CONCERN FOR OSTEOMYELITIS THEN THE TEST OF CHOICE TO FURTHER EVALUATE WOULD BE MRI            ED BEDSIDE ULTRASOUND:   Performed by ED Physician - none    LABS:  Labs Reviewed   COMPREHENSIVE METABOLIC PANEL - Abnormal; Notable for the following components:       Result Value    Potassium 5.7 (*)     Glucose 149 (*)     BUN 38 (*)     CREATININE 1.11 (*)     GFR Non- 49.9 (*)     All other components within normal limits   CBC WITH AUTO DIFFERENTIAL - Abnormal; Notable for the following components:    MCV 73.9 (*)     MCH 25.7 (*)     RDW 16.2 (*)     All other components within normal limits   POTASSIUM - Abnormal; Notable for the following components:    Potassium 5.5 (*)     All other components within normal limits   LACTIC ACID, PLASMA       All other labs were within normal range or not returned as of this dictation. EMERGENCY DEPARTMENT COURSE and DIFFERENTIAL DIAGNOSIS/MDM:   Vitals:    Vitals:    04/07/19 1402 04/07/19 1535   BP: (!) 148/57 (!) 141/49   Pulse: 87 81   Resp: 18 16   Temp: 97.8 °F (36.6 °C)    TempSrc: Oral    SpO2: 96% 96%   Weight: 109 lb (49.4 kg)    Height: 5' 4\" (1.626 m)             MDM  Number of Diagnoses or Management Options  Hyperkalemia:   Skin ulcer of right foot, limited to breakdown of skin ([address]): Type 2 diabetes mellitus with hyperglycemia, with long-term current use of insulin [facility]):   Diagnosis management comments: Reviewed with Dr. Kori Escoto repeated potassium is 5.5 we'll send patient home with Kayexalate confirmed with hospital pharmacy may use 1 dose twice per day for 1 day. Per Dr. Kori Escoto located disposition to home       Amount and/or Complexity of Data Reviewed  Clinical lab tests: reviewed and ordered  Tests in the radiology section of CPT®: ordered  Discuss the patient with other providers: yes        CRITICAL CARE TIME       CONSULTS:  None    PROCEDURES:  Unless otherwise noted below, none     Procedures    FINAL IMPRESSION      1. Skin ulcer of right foot, limited to breakdown of skin ([address]    2. Hyperkalemia    3. Type 2 diabetes mellitus with hyperglycemia, with long-term current use of insulin [facility])          DISPOSITION/PLAN   DISPOSITION Decision To Discharge 04/07/2019 05:12:56 PM      PATIENT REFERRED TO:  Your Primary care doctor    Schedule an appointment as soon as possible for a visit in 2 days      [facility]) ED  [address] [facility]  [phone]    If symptoms worsen      DISCHARGE MEDICATIONS:  New Prescriptions    MUPIROCIN (BACTROBAN) 2 % OINTMENT    Apply topically 3 times daily. 22 gram tube generic OK.     SODIUM POLYSTYRENE (SPS) 15 GM/60ML SUSPENSION    Take 60 mLs by mouth 2 times daily for 2 doses          (Please note that portions of this note were completed with a voice recognition program. Efforts were made to edit the dictations but occasionally words are mis-transcribed.)    Philomena Baron PA-C (electronically signed)  Attending Emergency Physician         Philomena Baron PA-C  04/07/19 [address] [place], [place] [address]FERMIN  04/07/19 8655

## 2019-04-07 NOTE — ED TRIAGE NOTES
Pt to ER with c/o ulcer on lateral aspect of right 3rd toe. Toe is swollen and discolored. Other toes WNL. Skin warm and dry.  Pt denies fever/chills/n/v.

## 2019-04-20 ENCOUNTER — HOSPITAL ENCOUNTER (EMERGENCY)
Age: 64
Discharge: HOME | End: 2019-04-21
Attending: FAMILY MEDICINE
Payer: MEDICARE

## 2019-04-20 ENCOUNTER — APPOINTMENT (OUTPATIENT)
Dept: CT IMAGING | Age: 64
End: 2019-04-20
Payer: MEDICARE

## 2019-04-20 ENCOUNTER — APPOINTMENT (OUTPATIENT)
Dept: ULTRASOUND IMAGING | Age: 64
End: 2019-04-20
Payer: MEDICARE

## 2019-04-20 VITALS
OXYGEN SATURATION: 98 % | DIASTOLIC BLOOD PRESSURE: 71 MMHG | HEART RATE: 75 BPM | RESPIRATION RATE: 14 BRPM | WEIGHT: 165 LBS | TEMPERATURE: 98.2 F | SYSTOLIC BLOOD PRESSURE: 157 MMHG | BODY MASS INDEX: 28.32 KG/M2

## 2019-04-20 DIAGNOSIS — L97.511 DIABETIC ULCER OF TOE OF RIGHT FOOT ASSOCIATED WITH DIABETES MELLITUS DUE TO UNDERLYING CONDITION, LIMITED TO BREAKDOWN OF SKIN (HCC): Primary | ICD-10-CM

## 2019-04-20 DIAGNOSIS — E08.621 DIABETIC ULCER OF TOE OF RIGHT FOOT ASSOCIATED WITH DIABETES MELLITUS DUE TO UNDERLYING CONDITION, LIMITED TO BREAKDOWN OF SKIN (HCC): Primary | ICD-10-CM

## 2019-04-20 LAB
ALBUMIN SERPL-MCNC: 4.1 G/DL (ref 3.5–4.6)
ALP BLD-CCNC: 85 U/L (ref 40–130)
ALT SERPL-CCNC: 17 U/L (ref 0–33)
ANION GAP SERPL CALCULATED.3IONS-SCNC: 14 MEQ/L (ref 9–15)
AST SERPL-CCNC: 28 U/L (ref 0–35)
BASOPHILS ABSOLUTE: 0.1 K/UL (ref 0–0.2)
BASOPHILS RELATIVE PERCENT: 1 %
BILIRUB SERPL-MCNC: <0.2 MG/DL (ref 0.2–0.7)
BUN BLDV-MCNC: 30 MG/DL (ref 8–23)
CALCIUM SERPL-MCNC: 9.5 MG/DL (ref 8.5–9.9)
CHLORIDE BLD-SCNC: 94 MEQ/L (ref 95–107)
CO2: 28 MEQ/L (ref 20–31)
CREAT SERPL-MCNC: 1.32 MG/DL (ref 0.5–0.9)
EOSINOPHILS ABSOLUTE: 0.1 K/UL (ref 0–0.7)
EOSINOPHILS RELATIVE PERCENT: 0.9 %
GFR AFRICAN AMERICAN: 49.4
GFR NON-AFRICAN AMERICAN: 40.9
GLOBULIN: 3.3 G/DL (ref 2.3–3.5)
GLUCOSE BLD-MCNC: 168 MG/DL (ref 70–99)
HCT VFR BLD CALC: 36.8 % (ref 37–47)
HEMOGLOBIN: 12.7 G/DL (ref 12–16)
LACTIC ACID: 1.6 MMOL/L (ref 0.5–2.2)
LYMPHOCYTES ABSOLUTE: 4.6 K/UL (ref 1–4.8)
LYMPHOCYTES RELATIVE PERCENT: 39.8 %
MCH RBC QN AUTO: 24.9 PG (ref 27–31.3)
MCHC RBC AUTO-ENTMCNC: 34.4 % (ref 33–37)
MCV RBC AUTO: 72.3 FL (ref 82–100)
MONOCYTES ABSOLUTE: 1.1 K/UL (ref 0.2–0.8)
MONOCYTES RELATIVE PERCENT: 9.5 %
NEUTROPHILS ABSOLUTE: 5.7 K/UL (ref 1.4–6.5)
NEUTROPHILS RELATIVE PERCENT: 48.8 %
PDW BLD-RTO: 15.7 % (ref 11.5–14.5)
PLATELET # BLD: 217 K/UL (ref 130–400)
POTASSIUM SERPL-SCNC: 5.5 MEQ/L (ref 3.4–4.9)
RBC # BLD: 5.09 M/UL (ref 4.2–5.4)
SODIUM BLD-SCNC: 136 MEQ/L (ref 135–144)
TOTAL PROTEIN: 7.4 G/DL (ref 6.3–8)
WBC # BLD: 11.6 K/UL (ref 4.8–10.8)

## 2019-04-20 PROCEDURE — 85025 COMPLETE CBC W/AUTO DIFF WBC: CPT

## 2019-04-20 PROCEDURE — 36415 COLL VENOUS BLD VENIPUNCTURE: CPT

## 2019-04-20 PROCEDURE — 87040 BLOOD CULTURE FOR BACTERIA: CPT

## 2019-04-20 PROCEDURE — 73701 CT LOWER EXTREMITY W/DYE: CPT

## 2019-04-20 PROCEDURE — 2580000003 HC RX 258: Performed by: FAMILY MEDICINE

## 2019-04-20 PROCEDURE — 6360000002 HC RX W HCPCS: Performed by: FAMILY MEDICINE

## 2019-04-20 PROCEDURE — 96365 THER/PROPH/DIAG IV INF INIT: CPT

## 2019-04-20 PROCEDURE — 2580000003 HC RX 258: Performed by: PHYSICIAN ASSISTANT

## 2019-04-20 PROCEDURE — 99283 EMERGENCY DEPT VISIT LOW MDM: CPT

## 2019-04-20 PROCEDURE — 6360000004 HC RX CONTRAST MEDICATION: Performed by: FAMILY MEDICINE

## 2019-04-20 PROCEDURE — 83605 ASSAY OF LACTIC ACID: CPT

## 2019-04-20 PROCEDURE — 93926 LOWER EXTREMITY STUDY: CPT

## 2019-04-20 PROCEDURE — 96367 TX/PROPH/DG ADDL SEQ IV INF: CPT

## 2019-04-20 PROCEDURE — 80053 COMPREHEN METABOLIC PANEL: CPT

## 2019-04-20 RX ORDER — 0.9 % SODIUM CHLORIDE 0.9 %
1000 INTRAVENOUS SOLUTION INTRAVENOUS ONCE
Status: COMPLETED | OUTPATIENT
Start: 2019-04-20 | End: 2019-04-21

## 2019-04-20 RX ORDER — AMOXICILLIN AND CLAVULANATE POTASSIUM 875; 125 MG/1; MG/1
1 TABLET, FILM COATED ORAL 2 TIMES DAILY
Qty: 20 TABLET | Refills: 0 | Status: SHIPPED | OUTPATIENT
Start: 2019-04-20 | End: 2019-04-30

## 2019-04-20 RX ADMIN — SODIUM CHLORIDE 1000 ML: 9 INJECTION, SOLUTION INTRAVENOUS at 23:44

## 2019-04-20 RX ADMIN — IOPAMIDOL 100 ML: 612 INJECTION, SOLUTION INTRAVENOUS at 23:16

## 2019-04-20 RX ADMIN — PIPERACILLIN SODIUM,TAZOBACTAM SODIUM 3.38 G: 3; .375 INJECTION, POWDER, FOR SOLUTION INTRAVENOUS at 21:14

## 2019-04-20 RX ADMIN — VANCOMYCIN HYDROCHLORIDE 1000 MG: 1 INJECTION, POWDER, LYOPHILIZED, FOR SOLUTION INTRAVENOUS at 21:37

## 2019-04-20 ASSESSMENT — ENCOUNTER SYMPTOMS
ALLERGIC/IMMUNOLOGIC NEGATIVE: 1
EYES NEGATIVE: 1
GASTROINTESTINAL NEGATIVE: 1
RESPIRATORY NEGATIVE: 1
COLOR CHANGE: 1

## 2019-04-20 ASSESSMENT — PAIN DESCRIPTION - LOCATION: LOCATION: TOE (COMMENT WHICH ONE)

## 2019-04-20 ASSESSMENT — PAIN DESCRIPTION - ORIENTATION: ORIENTATION: RIGHT

## 2019-04-20 ASSESSMENT — PAIN SCALES - GENERAL: PAINLEVEL_OUTOF10: 8

## 2019-04-21 LAB
GFR AFRICAN AMERICAN: 46
GFR NON-AFRICAN AMERICAN: 38
PERFORMED ON: ABNORMAL
POC CREATININE: 1.4 MG/DL (ref 0.6–1.2)
POC SAMPLE TYPE: ABNORMAL

## 2019-04-21 NOTE — ED PROVIDER NOTES
[address] [facility] ED  eMERGENCY dEPARTMENT eNCOUnter      Pt Name: Lizbet Vigil  MRN: [id number]  [place] 1955  Date of evaluation: 4/20/2019  Provider: Osorio Chen PA-C    CHIEF COMPLAINT       Chief Complaint   Patient presents with    Skin Ulcer     toe ulcer, right foot non healing /DM         HISTORY OF PRESENT ILLNESS   (Location/Symptom, Timing/Onset,Context/Setting, Quality, Duration, Modifying Factors, Severity)  Note limiting factors. Lizbet Vigil is a 66 y.o. female who presents to the emergency department      Lizbet Vigil is a 66 y.o. female who presents to the emergency department presents with 1.5 month history of right foot ulcer noted between the fourth and third digits with some discoloration patient's notes pain with movement patient's known diabetic. Is moderate inseverity   Patient states the pain have been getting worse with seen in the ER once and was seen with her primary care doctor was on topical and oral antibiotic with no improvement scheduled to see the podiatrist on the 28th but she is here with her  and they are both concerned about  Possible osteomyelitis they feel that the ulcer is getting deeper    The history is provided by the patient. NursingNotes were reviewed. REVIEW OF SYSTEMS    (2-9 systems for level 4, 10 or more for level 5)     Review of Systems   Constitutional: Positive for chills. HENT: Negative. Eyes: Negative. Respiratory: Negative. Cardiovascular: Negative. Gastrointestinal: Negative. Endocrine: Negative. Genitourinary: Negative. Musculoskeletal: Positive for arthralgias. Skin: Positive for color change and wound. Allergic/Immunologic: Negative. Neurological: Negative. Hematological: Negative. Psychiatric/Behavioral: Negative. Except as noted above the remainder of the review of systems was reviewed and negative.        PAST MEDICAL HISTORY     Past Medical History: Diagnosis Date    Diabetes mellitus ([facility] [address]     Hyperlipidemia     Hypertension          SURGICALHISTORY     No past surgical history on file. CURRENT MEDICATIONS       Previous Medications    FUROSEMIDE (LASIX) 40 MG TABLET    Take 40 mg by mouth 2 times daily    IBUPROFEN (IBU) 400 MG TABLET    Take 1 tablet by mouth every 6 hours as needed for Pain    INSULIN GLARGINE (LANTUS) 100 UNIT/ML INJECTION VIAL    Inject 35 Units into the skin daily    INSULIN LISPRO (HUMALOG) 100 UNIT/ML INJECTION VIAL    Inject 0-6 Units into the skin 3 times daily (with meals)    INSULIN LISPRO (HUMALOG) 100 UNIT/ML INJECTION VIAL    Inject 5 Units into the skin 3 times daily (with meals)    LEVOTHYROXINE (SYNTHROID) 50 MCG TABLET    Take 50 mcg by mouth Daily    MECLIZINE (ANTIVERT) 25 MG TABLET    Take 25 mg by mouth three times daily    METFORMIN (GLUCOPHAGE) 1000 MG TABLET    Take 1,000 mg by mouth every 12 hours    METOPROLOL SUCCINATE (TOPROL XL) 25 MG EXTENDED RELEASE TABLET    Take 1 tablet by mouth daily    METOPROLOL SUCCINATE (TOPROL XL) 50 MG EXTENDED RELEASE TABLET    Take 1 tablet by mouth daily    MONTELUKAST (SINGULAIR) 10 MG TABLET    Take 10 mg by mouth daily    SERTRALINE (ZOLOFT) 50 MG TABLET    Take 1 tablet by mouth daily    SIMVASTATIN (ZOCOR) 40 MG TABLET    Take 40 mg by mouth daily    SODIUM POLYSTYRENE (SPS) 15 GM/60ML SUSPENSION    Take 60 mLs by mouth 2 times daily for 2 doses       ALLERGIES     Ambien [zolpidem tartrate]; Capoten [captopril]; Clioquinol; Cogentin [benztropine]; Depakote [divalproex sodium]; Effexor xr [venlafaxine hcl er]; Geodon [ziprasidone hcl]; Navane [thiothixene]; Pamelor [nortriptyline hcl]; Remeron [mirtazapine]; Risperdal [risperidone]; Trazodone and nefazodone; and Wellbutrin [bupropion]    FAMILY HISTORY     No family history on file.        SOCIAL HISTORY       Social History     Socioeconomic History    Marital status:      Spouse name: Not on file    Number of children: Not on file    Years of education: Not on file    Highest education level: Not on file   Occupational History    Not on file   Social Needs    Financial resource strain: Not on file    Food insecurity:     Worry: Not on file     Inability: Not on file    Transportation needs:     Medical: Not on file     Non-medical: Not on file   Tobacco Use    Smoking status: Never Smoker    Smokeless tobacco: Never Used   Substance and Sexual Activity    Alcohol use: Not on file    Drug use: Not on file    Sexual activity: Not on file   Lifestyle    Physical activity:     Days per week: Not on file     Minutes per session: Not on file    Stress: Not on file   Relationships    Social connections:     Talks on phone: Not on file     Gets together: Not on file     Attends Hinduism service: Not on file     Active member of club or organization: Not on file     Attends meetings of clubs or organizations: Not on file     Relationship status: Not on file    Intimate partner violence:     Fear of current or ex partner: Not on file     Emotionally abused: Not on file     Physically abused: Not on file     Forced sexual activity: Not on file   Other Topics Concern    Not on file   Social History Narrative    Not on file       SCREENINGS      @[id number]([id number])@      PHYSICAL EXAM    (up to 7 for level 4, 8 or more for level 5)     ED Triage Vitals   BP Temp Temp Source Pulse Resp SpO2 Height Weight   04/20/19 2027 04/20/19 2027 04/20/19 2027 04/20/19 2027 04/20/19 2027 04/20/19 2027 -- 04/20/19 2039   (!) 153/59 98.2 °F (36.8 °C) Oral 72 16 98 %  165 lb (74.8 kg)       Physical Exam   Constitutional: She is oriented to person, place, and time. She appears well-developed and well-nourished. HENT:   Head: Normocephalic and atraumatic.    Right Ear: External ear normal.   Left Ear: External ear normal.   Nose: Nose normal.   Mouth/Throat: Oropharynx is clear and moist.   Eyes: Pupils are equal, round, and reactive to light. Neck: Normal range of motion. Neck supple. Cardiovascular: Normal rate, regular rhythm, normal heart sounds and intact distal pulses. Pulmonary/Chest: Effort normal and breath sounds normal. No respiratory distress. She has no wheezes. She has no rales. She exhibits no tenderness. Abdominal: Soft. Bowel sounds are normal.   Musculoskeletal: Normal range of motion. Exam of extremities: no pedal edema noted, intact peripheral pulses, ulcer noted in between the surgeon the first toe on this third toe with surrounding erythema and pain involving both the third and the [facility] toe on the right foot     Neurological: She is alert and oriented to person, place, and time. She has normal strength. No cranial nerve deficit or sensory deficit. Reflex Scores:       Tricep reflexes are 2+ on the right side and 2+ on the left side. Bicep reflexes are 2+ on the right side and 2+ on the left side. Brachioradialis reflexes are 2+ on the right side and 2+ on the left side. Patellar reflexes are 2+ on the right side and 2+ on the left side. Achilles reflexes are 2+ on the right side and 2+ on the left side. Skin: Skin is warm and dry. Psychiatric: She has a normal mood and affect. Her behavior is normal. Judgment and thought content normal.   Nursing note and vitals reviewed.       DIAGNOSTIC RESULTS     EKG: All EKG's are interpreted by the Emergency Department Physician who either signs or Co-signsthis chart in the absence of a cardiologist.      RADIOLOGY:   Non-plain filmimages such as CT, Ultrasound and MRI are read by the radiologist. Plain radiographic images are visualized and preliminarily interpreted by the emergency physician with the below findings:    CT neg  US neg    Interpretation per the Radiologist below, if available at the time ofthis note:    [address]    (Results Pending)   US DUP LOWER EXTREMITY RIGHT ARTERIES    (Results Pending)         ED BEDSIDE ULTRASOUND:   Performed by ED Physician - none    LABS:  Labs Reviewed   COMPREHENSIVE METABOLIC PANEL - Abnormal; Notable for the following components:       Result Value    Potassium 5.5 (*)     Chloride 94 (*)     Glucose 168 (*)     BUN 30 (*)     CREATININE 1.32 (*)     GFR Non- 40.9 (*)     GFR  49.4 (*)     All other components within normal limits   CBC WITH AUTO DIFFERENTIAL - Abnormal; Notable for the following components:    WBC 11.6 (*)     Hematocrit 36.8 (*)     MCV 72.3 (*)     MCH 24.9 (*)     RDW 15.7 (*)     Monocytes # 1.1 (*)     All other components within normal limits   CULTURE BLOOD #2   CULTURE BLOOD #1   LACTIC ACID, PLASMA       All other labs were within normal range or not returned as of this dictation. EMERGENCY DEPARTMENT COURSE and DIFFERENTIAL DIAGNOSIS/MDM:   Vitals:    Vitals:    04/20/19 2027 04/20/19 2039 04/20/19 2133 04/20/19 2346   BP: (!) 153/59  (!) 142/56 (!) 157/71   Pulse: 72  66 75   Resp: 16  16 14   Temp: 98.2 °F (36.8 °C)      TempSrc: Oral      SpO2: 98%  100% 98%   Weight:  165 lb (74.8 kg)                  MDM  Number of Diagnoses or Management Options     Amount and/or Complexity of Data Reviewed  Clinical lab tests: ordered and reviewed  Tests in the radiology section of CPT®: ordered and reviewed        No osteo. Ulcer with localized cellulitis. Antibiotics and podiatry follow up    CONSULTS:  None    PROCEDURES:  Unless otherwise noted below, none     Procedures    FINAL IMPRESSION      1.  Diabetic ulcer of toe of right foot associated with diabetes mellitus due to underlying condition, limited to breakdown of skin [facility])          DISPOSITION/PLAN   DISPOSITION Decision To Discharge 04/20/2019 11:56:09 PM      PATIENT REFERRED TO:  Jania Goodman  [address] [place] [address],[phone]    In 2 days        DISCHARGE MEDICATIONS:  New Prescriptions    AMOXICILLIN-CLAVULANATE (AUGMENTIN) 875-125 MG PER TABLET    Take 1 tablet by mouth 2 times daily for 10 days          (Please note thatportions of this note were completed with a voice recognition program.  Efforts were made to edit the dictations but occasionally words are mis-transcribed.)    Pantera Erickson PA-C (electronically signed)  Attending Emergency Physician          Pantera Erickson PA-C  04/20/19 1105

## 2019-04-23 ENCOUNTER — HOSPITAL ENCOUNTER (OUTPATIENT)
Dept: WOUND CARE | Age: 64
Discharge: HOME | End: 2019-04-23
Payer: MEDICARE

## 2019-04-23 VITALS
TEMPERATURE: 97.8 F | HEART RATE: 65 BPM | SYSTOLIC BLOOD PRESSURE: 144 MMHG | DIASTOLIC BLOOD PRESSURE: 73 MMHG | RESPIRATION RATE: 16 BRPM

## 2019-04-23 DIAGNOSIS — L97.509 NON-PRESSURE ULCER OF TOE (HCC): ICD-10-CM

## 2019-04-23 PROCEDURE — 87070 CULTURE OTHR SPECIMN AEROBIC: CPT

## 2019-04-23 PROCEDURE — 87186 SC STD MICRODIL/AGAR DIL: CPT

## 2019-04-23 PROCEDURE — 11042 DBRDMT SUBQ TIS 1ST 20SQCM/<: CPT

## 2019-04-23 PROCEDURE — 87077 CULTURE AEROBIC IDENTIFY: CPT

## 2019-04-23 PROCEDURE — 87205 SMEAR GRAM STAIN: CPT

## 2019-04-23 PROCEDURE — 99213 OFFICE O/P EST LOW 20 MIN: CPT

## 2019-04-23 RX ORDER — LISINOPRIL 10 MG/1
10 TABLET ORAL DAILY
Status: ON HOLD | COMMUNITY
End: 2020-04-22 | Stop reason: HOSPADM

## 2019-04-23 SDOH — HEALTH STABILITY: MENTAL HEALTH: HOW OFTEN DO YOU HAVE A DRINK CONTAINING ALCOHOL?: NEVER

## 2019-04-23 ASSESSMENT — PAIN DESCRIPTION - ORIENTATION
ORIENTATION: RIGHT
ORIENTATION: RIGHT

## 2019-04-23 ASSESSMENT — PAIN SCALES - GENERAL: PAINLEVEL_OUTOF10: 9

## 2019-04-23 ASSESSMENT — PAIN DESCRIPTION - LOCATION
LOCATION: TOE (COMMENT WHICH ONE)
LOCATION: TOE (COMMENT WHICH ONE)

## 2019-04-23 ASSESSMENT — PAIN DESCRIPTION - DESCRIPTORS: DESCRIPTORS: SHARP

## 2019-04-23 ASSESSMENT — PAIN DESCRIPTION - FREQUENCY: FREQUENCY: INTERMITTENT

## 2019-04-23 NOTE — CODE DOCUMENTATION
[address] [facility] Billing Sheet. Tejas Ramos  AGE: 66 y.o.    GENDER: female  : 1955  TODAY'S DATE:  2019    ICD-10 [address] [facility] [address] Problems    Diagnosis Date Noted    Non-pressure ulcer of toe ([facility] [address] [Z55.096] 2019    Type 2 diabetes mellitus with hyperglycemia, with long-term current use of insulin ([facility]) [E11.65, Z79.4] 2019       PHYSICIAN PROCEDURES    CPT CODE  39634-99  46543 R-3      Electronically signed by Domenic Lorenz DPM on 2019 at 4:48 PM no

## 2019-04-23 NOTE — PROGRESS NOTES
 lisinopril (PRINIVIL;ZESTRIL) 10 MG tablet Take 10 mg by mouth daily      amoxicillin-clavulanate (AUGMENTIN) 875-125 MG per tablet Take 1 tablet by mouth 2 times daily for 10 days 20 tablet 0    ibuprofen (IBU) 400 MG tablet Take 1 tablet by mouth every 6 hours as needed for Pain 20 tablet 0    sertraline (ZOLOFT) 50 MG tablet Take 1 tablet by mouth daily 30 tablet 0    insulin lispro (HUMALOG) 100 UNIT/ML injection vial Inject 0-6 Units into the skin 3 times daily (with meals) 1 vial 3    insulin lispro (HUMALOG) 100 UNIT/ML injection vial Inject 5 Units into the skin 3 times daily (with meals) 1 vial 3    insulin glargine (LANTUS) 100 UNIT/ML injection vial Inject 35 Units into the skin daily 1 vial 3    metoprolol succinate (TOPROL XL) 25 MG extended release tablet Take 1 tablet by mouth daily 30 tablet 3    levothyroxine (SYNTHROID) 50 MCG tablet Take 50 mcg by mouth Daily      furosemide (LASIX) 40 MG tablet Take 40 mg by mouth 2 times daily      simvastatin (ZOCOR) 40 MG tablet Take 40 mg by mouth daily      meclizine (ANTIVERT) 25 MG tablet Take 25 mg by mouth three times daily      montelukast (SINGULAIR) 10 MG tablet Take 10 mg by mouth daily      sodium polystyrene (SPS) 15 GM/60ML suspension Take 60 mLs by mouth 2 times daily for 2 doses 120 mL 0    metoprolol succinate (TOPROL XL) 50 MG extended release tablet Take 1 tablet by mouth daily 30 tablet 3    metFORMIN (GLUCOPHAGE) 1000 MG tablet Take 1,000 mg by mouth every 12 hours       No current facility-administered medications on file prior to encounter. REVIEW OF SYSTEMS    Pertinent items are noted in HPI. Objective:      BP (!) 144/73   Pulse 65   Temp 97.8 °F (36.6 °C) (Temporal)   Resp 16     Wt Readings from Last 3 Encounters:   04/20/19 165 lb (74.8 kg)   04/07/19 109 lb (49.4 kg)   02/21/19 210 lb (95.3 kg)       PHYSICAL EXAM    Constitutional:   Well nourished and well developed. Appears neat and clean. Patient is alert, oriented x3, and in no apparent distress. Respiratory:  Respiratory effort is easy and symmetric bilaterally. Rate is normal at rest and on room air. Vascular:  Pedal Pulses is palpable and audible with doppler. Capillary refill is <3 sec to digits bilateral.  Extremities negativefor  pitting edema. Neurological:   Sensation is absent to lower extremities. Dermatological:  Wound description noted in wound assessment. The wound(s) are full thickness with a partial granular base. Psychiatric:  Judgement and insight intact. Short and long term memory intact. No evidence of depression, anxiety, or agitation. Patient is calm, cooperative, and communicative. Appropriate interactions and affect. Assessment:      Problem List Items Addressed This Visit     None           Procedure Note  Indications:  Based on my examination of this patient's wound(s)/ulcer(s) today, debridement is required to promote healing and evaluate the wound base. Performed by: Otto Barroso DPM    Consent obtained:  Yes    Time out taken:  Yes    Pain Control:         Debridement:Excisional Debridement    Using forceps the wound(s)/ulcer(s) was/were sharply debrided down through and including the removal of subcutaneous tissue.         Devitalized Tissue Debrided:  necrotic/eschar and callus    Pre Debridement Measurements:  Are located in the [place]  Documentation Flow Sheet    Wound/Ulcer #: 1    Post Debridement Measurements:  Wound/Ulcer Descriptions are Pre Debridement except measurements:    Wound 04/23/19 Toe (Comment  which one) Right #1. 3rd toe (Active)   Wound Image   4/23/2019  3:39 PM   Wound Diabetic Hagen 2 4/23/2019  3:39 PM   Wound Length (cm) 1 cm 4/23/2019  3:39 PM   Wound Width (cm) 0.8 cm 4/23/2019  3:39 PM   Wound Depth (cm) 0.2 cm 4/23/2019  3:39 PM   Wound Surface Area (cm^2) 0.8 cm^2 4/23/2019  3:39 PM   Wound Volume (cm^3) 0.16 cm^3 4/23/2019  3:39 PM   Drainage Amount Small 4/23/2019  3:39 PM   Drainage Description Sanguinous 4/23/2019  3:39 PM   Odor None 4/23/2019  3:39 PM   Florencia-wound Assessment Intact 4/23/2019  3:39 PM   Red%Wound Bed 40 4/23/2019  3:39 PM   Yellow%Wound Bed 50 4/23/2019  3:39 PM   Black%Wound Bed 10 4/23/2019  3:39 PM   Number of days: 0                Percent of Wound/Ulcer Debrided: 100%    Total Surface Area Debrided:  0.8 sq cm     Diabetic/Pressure/Non Pressure Ulcers:  Ulcer: Diabetic ulcer, fat layer exposed      Bleeding:  Minimal    Hemostasis Achieved:  by pressure    Procedural Pain:  0  / 10     Post Procedural Pain:  0 / 10     Response to treatment:  Well tolerated by patient. Plan:   Rx for bactroban and saline  Culture taken  Explained treatment through [facility] . Treatment Note please see attached Discharge Instructions    Written patient dismissal instructions given to patient and signed by patient or POA. Discharge Instructions       [facility] Wound [facility]    Physician Orders and Discharge Instructions  [address]  Telephone: [phone]      [phone]      Home Care:  NONE    Wound Location:  RIGHT THIRD TOE    Dressing orders:  1. CLEANSE WOUND WITH NORMAL SALINE. 2. APPLY BACTROBAN TO WOUND. 3. APPLY PROMOGRAN TO WOUND. 4. COVER WOUND WITH GAUZE, PLACE GAUZE BETWEEN RIGHT TOES. 5. CHANGE DRESSING EVERY DAY. Compression:  NONE    Other Instructions: WEAR A WIDE SHOE, FINISH ANTIBIOTIC    Keep all dressings clean & dry. Keep pressure off the wound(s) at all times. Do not shower, take baths or get wound wet, unless otherwise instructed by your Wound Care doctor. Follow up visit   1 Weeks  April 30, 2019 AT 8:45AM  For Diabetic patients keep blood sugars below 150 for optimal wound healing.     If you experience any of the following, please call the Wound Care Service during business hours: [phone]     *Increase in pain *Temperature over 101 *Increase in drainage from your wound or a foul odor  *Uncontrolled swelling *Need for compression bandage changes due to slippage, breakthrough drainage      If you need medical attention outside of business hours, please contact your Primary Care Doctor or go to the nearest emergency room. Keep next scheduled appointment. Please give 24 hour notice if unable to keep appointment. [phone]    PLEASE NOTE: IF YOU ARE UNABLE TO OBTAIN WOUND SUPPLIES, CONTINUE TO USE THE SUPPLIES YOU HAVE AVAILABLE UNTIL YOU ARE ABLE TO REACH US.  IT IS MOST IMPORTANT TO KEEP THE WOUND COVERED AT ALL TIMES  Electronically signed by Jazzy Solo DPM on 4/23/2019 at 4:36 PM                Electronically signed by Jazzy Solo DPM on 4/23/2019 at 4:37 PM

## 2019-04-25 LAB
BLOOD CULTURE, ROUTINE: NORMAL
CULTURE, BLOOD 2: NORMAL

## 2019-04-26 LAB
GRAM STAIN RESULT: ABNORMAL
ORGANISM: ABNORMAL
WOUND/ABSCESS: ABNORMAL
WOUND/ABSCESS: ABNORMAL

## 2019-04-30 ENCOUNTER — HOSPITAL ENCOUNTER (OUTPATIENT)
Dept: WOUND CARE | Age: 64
Discharge: HOME | End: 2019-04-30
Payer: MEDICARE

## 2019-04-30 VITALS
RESPIRATION RATE: 20 BRPM | HEART RATE: 56 BPM | SYSTOLIC BLOOD PRESSURE: 140 MMHG | DIASTOLIC BLOOD PRESSURE: 61 MMHG | BODY MASS INDEX: 34.15 KG/M2 | TEMPERATURE: 97.9 F | WEIGHT: 200 LBS | HEIGHT: 64 IN

## 2019-04-30 DIAGNOSIS — I70.219 ATHEROSCLEROTIC PVD WITH INTERMITTENT CLAUDICATION (HCC): Chronic | ICD-10-CM

## 2019-04-30 PROCEDURE — 11042 DBRDMT SUBQ TIS 1ST 20SQCM/<: CPT

## 2019-04-30 RX ORDER — CIPROFLOXACIN 500 MG/1
500 TABLET, FILM COATED ORAL 2 TIMES DAILY
Qty: 20 TABLET | Refills: 0 | Status: SHIPPED | OUTPATIENT
Start: 2019-04-30 | End: 2019-05-10

## 2019-04-30 ASSESSMENT — PAIN DESCRIPTION - PAIN TYPE: TYPE: ACUTE PAIN

## 2019-04-30 ASSESSMENT — PAIN DESCRIPTION - ONSET: ONSET: AWAKENED FROM SLEEP

## 2019-04-30 ASSESSMENT — PAIN DESCRIPTION - DESCRIPTORS: DESCRIPTORS: SHARP

## 2019-04-30 ASSESSMENT — PAIN SCALES - GENERAL: PAINLEVEL_OUTOF10: 9

## 2019-04-30 ASSESSMENT — PAIN DESCRIPTION - LOCATION: LOCATION: TOE (COMMENT WHICH ONE)

## 2019-04-30 ASSESSMENT — PAIN DESCRIPTION - FREQUENCY: FREQUENCY: INTERMITTENT

## 2019-04-30 ASSESSMENT — PAIN DESCRIPTION - ORIENTATION: ORIENTATION: RIGHT

## 2019-04-30 NOTE — PROGRESS NOTES
[facility] [address]                                                   Progress Note and Procedure Note      Garth Mota RECORD NUMBER:  [id number]  AGE: 66 y.o. GENDER: female  : 1955  EPISODE DATE:  2019    Subjective:     Chief Complaint   Patient presents with    Wound Check     right third toe wound recheck         HISTORY of PRESENT ILLNESS HPI     Diane Neal is a 66 y.o. female who presents today for wound/ulcer evaluation. History of Wound Context: Non-healing diabetic ulcer of the right 3rd toe. Hagen stage III. using bactoban on the the wound. Culture positive for Aceintobacter sensitive to cipro  Wound/Ulcer Pain Timing/Severity: intermittent  Quality of pain: N/A  Severity:  1 / 10   Modifying Factors: None  Associated Signs/Symptoms: none    Ulcer Identification:  Ulcer Type: diabetic and neuropathic  Contributing Factors: diabetes    Wound: N/A        PAST MEDICAL HISTORY        Diagnosis Date    Diabetes mellitus ([facility] [address]     Hyperlipidemia     Hypertension        PAST SURGICAL HISTORY    History reviewed. No pertinent surgical history. FAMILY HISTORY    History reviewed. No pertinent family history.     SOCIAL HISTORY    Social History     Tobacco Use    Smoking status: Never Smoker    Smokeless tobacco: Never Used   Substance Use Topics    Alcohol use: Never     Frequency: Never    Drug use: Not on file       ALLERGIES    Allergies   Allergen Reactions    Ambien [Zolpidem Tartrate]     Capoten [Captopril]     Clioquinol     Cogentin [Benztropine]     Depakote [Divalproex Sodium]     Effexor Xr [Venlafaxine Hcl Er]     Geodon [Ziprasidone Hcl]     Navane [Thiothixene]     Pamelor [Nortriptyline Hcl]     Remeron [Mirtazapine]     Risperdal [Risperidone]     Trazodone And Nefazodone     Wellbutrin [Bupropion]        MEDICATIONS    Current Outpatient Medications on File Prior to Encounter   Medication Sig Dispense Refill    lisinopril (PRINIVIL;ZESTRIL) 10 MG tablet Take 10 mg by mouth daily      SODIUM CHLORIDE, EXTERNAL, 0.9 % SOLN Apply 1 Applicatorful topically daily 1000 mL 2    mupirocin (BACTROBAN) 2 % ointment Apply 1 times daily. 30 g 1    amoxicillin-clavulanate (AUGMENTIN) 875-125 MG per tablet Take 1 tablet by mouth 2 times daily for 10 days 20 tablet 0    ibuprofen (IBU) 400 MG tablet Take 1 tablet by mouth every 6 hours as needed for Pain 20 tablet 0    sertraline (ZOLOFT) 50 MG tablet Take 1 tablet by mouth daily 30 tablet 0    insulin lispro (HUMALOG) 100 UNIT/ML injection vial Inject 0-6 Units into the skin 3 times daily (with meals) 1 vial 3    insulin lispro (HUMALOG) 100 UNIT/ML injection vial Inject 5 Units into the skin 3 times daily (with meals) 1 vial 3    insulin glargine (LANTUS) 100 UNIT/ML injection vial Inject 35 Units into the skin daily 1 vial 3    metoprolol succinate (TOPROL XL) 25 MG extended release tablet Take 1 tablet by mouth daily 30 tablet 3    metoprolol succinate (TOPROL XL) 50 MG extended release tablet Take 1 tablet by mouth daily 30 tablet 3    levothyroxine (SYNTHROID) 50 MCG tablet Take 50 mcg by mouth Daily      furosemide (LASIX) 40 MG tablet Take 40 mg by mouth 2 times daily      simvastatin (ZOCOR) 40 MG tablet Take 40 mg by mouth daily      meclizine (ANTIVERT) 25 MG tablet Take 25 mg by mouth three times daily      metFORMIN (GLUCOPHAGE) 1000 MG tablet Take 1,000 mg by mouth every 12 hours      montelukast (SINGULAIR) 10 MG tablet Take 10 mg by mouth daily      sodium polystyrene (SPS) 15 GM/60ML suspension Take 60 mLs by mouth 2 times daily for 2 doses 120 mL 0     No current facility-administered medications on file prior to encounter. REVIEW OF SYSTEMS    Pertinent items are noted in HPI.     Objective:      BP (!) 140/61   Pulse 56   Temp 97.9 °F (36.6 °C) (Temporal)   Resp 20   Ht 5' 4\" (1.626 m)   Wt 200 lb (90.7 kg)   BMI 34.33 kg/m²     Wt (Comment  which one) Right #1. 3rd toe (Active)   Wound Image   4/30/2019  9:07 AM   Wound Diabetic Hagen 2 4/30/2019  9:07 AM   Wound Cleansed Rinsed/Irrigated with saline 4/30/2019  9:07 AM   Wound Length (cm) 1 cm 4/23/2019  3:39 PM   Wound Width (cm) 0.8 cm 4/23/2019  3:39 PM   Wound Depth (cm) 0.2 cm 4/23/2019  3:39 PM   Wound Surface Area (cm^2) 0.8 cm^2 4/23/2019  3:39 PM   Wound Volume (cm^3) 0.16 cm^3 4/23/2019  3:39 PM   Post-Procedure Length (cm) 0.8 cm 4/30/2019  9:24 AM   Post-Procedure Width (cm) 0.9 cm 4/30/2019  9:24 AM   Post-Procedure Depth (cm) 0.2 cm 4/30/2019  9:24 AM   Post-Procedure Surface Area (cm^2) 0.72 cm^2 4/30/2019  9:24 AM   Post-Procedure Volume (cm^3) 0.14 cm^3 4/30/2019  9:24 AM   Drainage Amount Scant 4/30/2019  9:07 AM   Drainage Description Serosanguinous 4/30/2019  9:07 AM   Odor Mild 4/30/2019  9:07 AM   Margins Defined edges 4/30/2019  9:07 AM   Florencia-wound Assessment Dry;Calloused 4/30/2019  9:07 AM   Red%Wound Bed 40 4/23/2019  3:39 PM   Yellow%Wound Bed 50 4/23/2019  3:39 PM   Black%Wound Bed 10 4/23/2019  3:39 PM   Other%Wound Bed 100 white 4/30/2019  9:07 AM   Number of days: 6                Percent of Wound/Ulcer Debrided: 100%    Total Surface Area Debrided:  0.8 sq cm     Diabetic/Pressure/Non Pressure Ulcers:  Ulcer: Diabetic ulcer, fat layer exposed      Bleeding:  Minimal    Hemostasis Achieved:  by pressure    Procedural Pain:  0  / 10     Post Procedural Pain:  0 / 10     Response to treatment:  Well tolerated by patient. Plan:   Rx for bactroban and saline  Art duplex  Referred to [facility] for PVD [place] to [address] nurse. Rx for cipro  Explained treatment through [facility] . Treatment Note please see attached Discharge Instructions    Written patient dismissal instructions given to patient and signed by patient or POA.          Discharge [address] Wound Care    Physician Orders and Discharge Instructions  [address]   Telephone: [phone]  NAME: Kinga Horton  : [id number]    Home Care:  NONE     Wound Location:  RIGHT THIRD TOE     Dressing orders:  1. CLEANSE WOUND WITH NORMAL SALINE. 2. APPLY BACTROBAN TO WOUND.  3.COVER WOUND WITH GAUZE, PLACE GAUZE BETWEEN RIGHT TOES. 4. CHANGE DRESSING EVERY DAY.     Compression:  NONE     Other Instructions: DR. Deshawn Abreu TO REFER TO DR. Lazarus Genta FOR EVALUATION, WEAR A WIDE SHOE, FINISH ANTIBIOTIC     Keep all dressings clean & dry. Keep pressure off the wound(s) at all times. Do not shower, take baths or get wound wet, unless otherwise instructed by your Wound Care doctor.     Follow up visit   2 Weeks MAY 14, 2019 AT 10:00AM    For Diabetic patients keep blood sugars below 150 for optimal wound healing.     If you experience any of the following, please call the Wound Care Service during business hours: [phone]                *Increase in pain         *Temperature over 101           *Increase in drainage from your wound or a foul odor  *Uncontrolled swelling            *Need for compression bandage changes due to slippage, breakthrough drainage                                                                                                                                                                                                                                                                                                                       If you need medical attention outside of business hours, please contact your Primary Care Doctor or go to the nearest emergency room. Keep next scheduled appointment. Please give 24 hour notice if unable to keep appointment. [phone]     PLEASE NOTE: IF YOU ARE UNABLE TO OBTAIN WOUND SUPPLIES, CONTINUE TO USE THE SUPPLIES YOU HAVE AVAILABLE UNTIL YOU ARE ABLE TO REACH US.  IT IS MOST IMPORTANT TO KEEP THE WOUND

## 2019-04-30 NOTE — CODE DOCUMENTATION
[id number] Physician Billing Sheet. Elizabeth Guillen  AGE: 66 y.o.    GENDER: female  : 1955  TODAY'S DATE:  2019    ICD-10 CODES  Active Hospital Problems    Diagnosis Date Noted    Atherosclerotic PVD with intermittent claudication ([facility] [address] [I70.219] 2019    Non-pressure ulcer of toe ([facility] [L97.509] 2019    Type 2 diabetes mellitus with hyperglycemia, with long-term current use of insulin (HCC) [E11.65, Z79.4] 2019       PHYSICIAN PROCEDURES    CPT CODE  53330 R-3      Electronically signed by Jazz Meeks DPM on 2019 at 9:42 AM

## 2019-05-13 ENCOUNTER — OFFICE VISIT (OUTPATIENT)
Dept: GASTROENTEROLOGY | Age: 64
End: 2019-05-13
Payer: MEDICARE

## 2019-05-13 VITALS — BODY MASS INDEX: 34.42 KG/M2 | OXYGEN SATURATION: 98 % | HEART RATE: 77 BPM | HEIGHT: 64 IN | WEIGHT: 201.6 LBS

## 2019-05-13 DIAGNOSIS — K62.5 RECTAL BLEEDING: Primary | ICD-10-CM

## 2019-05-13 PROCEDURE — 99203 OFFICE O/P NEW LOW 30 MIN: CPT | Performed by: SPECIALIST

## 2019-05-13 PROCEDURE — G8419 CALC BMI OUT NRM PARAM NOF/U: HCPCS | Performed by: SPECIALIST

## 2019-05-13 PROCEDURE — 3017F COLORECTAL CA SCREEN DOC REV: CPT | Performed by: SPECIALIST

## 2019-05-13 PROCEDURE — 1036F TOBACCO NON-USER: CPT | Performed by: SPECIALIST

## 2019-05-13 PROCEDURE — G8599 NO ASA/ANTIPLAT THER USE RNG: HCPCS | Performed by: SPECIALIST

## 2019-05-13 PROCEDURE — G8427 DOCREV CUR MEDS BY ELIG CLIN: HCPCS | Performed by: SPECIALIST

## 2019-05-13 RX ORDER — POLYETHYLENE GLYCOL 3350, SODIUM CHLORIDE, SODIUM BICARBONATE, POTASSIUM CHLORIDE 420; 11.2; 5.72; 1.48 G/4L; G/4L; G/4L; G/4L
4000 POWDER, FOR SOLUTION ORAL ONCE
Qty: 1 BOTTLE | Refills: 0 | Status: SHIPPED | OUTPATIENT
Start: 2019-05-13 | End: 2019-05-13

## 2019-05-13 ASSESSMENT — ENCOUNTER SYMPTOMS
NAUSEA: 0
ANAL BLEEDING: 0
ABDOMINAL DISTENTION: 0
RECTAL PAIN: 0
BLOOD IN STOOL: 0
EYES NEGATIVE: 1
GASTROINTESTINAL NEGATIVE: 1
DIARRHEA: 0
CONSTIPATION: 0
ABDOMINAL PAIN: 0
VOMITING: 0
RESPIRATORY NEGATIVE: 1

## 2019-05-13 NOTE — PROGRESS NOTES
Gastroenterology Clinic Visit    Shawn Pulido  [id number]  Chief Complaint   Patient presents with    Consultation       HPI: 66 y.o. female presents to the clinic with history of rectal bleeding while in [place] about 6 months ago. He shouldn't is [place]-speaking and history was obtained through her . Reports no significant change in bowel habits no abdominal pain. Patient also carries a diagnosis of possible chronic hep C however she is not aware of it. Review of Systems   Constitutional: Negative. HENT: Negative. Eyes: Negative. Respiratory: Negative. Cardiovascular: Negative. Gastrointestinal: Negative. Negative for abdominal distention, abdominal pain, anal bleeding, blood in stool, constipation, diarrhea, nausea, rectal pain and vomiting. Endocrine: Negative. Genitourinary: Negative. Musculoskeletal: Negative. Skin: Negative. Neurological: Negative. Hematological: Negative. Psychiatric/Behavioral: Negative. Past Medical History:   Diagnosis Date    Diabetes mellitus ([facility] [address]     Hyperlipidemia     Hypertension       History reviewed. No pertinent surgical history.   Current Outpatient Medications on File Prior to Visit   Medication Sig Dispense Refill    lisinopril (PRINIVIL;ZESTRIL) 10 MG tablet Take 10 mg by mouth daily      SODIUM CHLORIDE, EXTERNAL, 0.9 % SOLN Apply 1 Applicatorful topically daily 1000 mL 2    ibuprofen (IBU) 400 MG tablet Take 1 tablet by mouth every 6 hours as needed for Pain 20 tablet 0    sertraline (ZOLOFT) 50 MG tablet Take 1 tablet by mouth daily 30 tablet 0    insulin lispro (HUMALOG) 100 UNIT/ML injection vial Inject 0-6 Units into the skin 3 times daily (with meals) 1 vial 3    insulin lispro (HUMALOG) 100 UNIT/ML injection vial Inject 5 Units into the skin 3 times daily (with meals) 1 vial 3    insulin glargine (LANTUS) 100 UNIT/ML injection vial Inject 35 Units into the skin daily 1 vial 3    metoprolol succinate (TOPROL XL) 25 MG extended release tablet Take 1 tablet by mouth daily 30 tablet 3    metoprolol succinate (TOPROL XL) 50 MG extended release tablet Take 1 tablet by mouth daily 30 tablet 3    levothyroxine (SYNTHROID) 50 MCG tablet Take 50 mcg by mouth Daily      furosemide (LASIX) 40 MG tablet Take 40 mg by mouth 2 times daily      simvastatin (ZOCOR) 40 MG tablet Take 40 mg by mouth daily      meclizine (ANTIVERT) 25 MG tablet Take 25 mg by mouth three times daily      metFORMIN (GLUCOPHAGE) 1000 MG tablet Take 1,000 mg by mouth every 12 hours      montelukast (SINGULAIR) 10 MG tablet Take 10 mg by mouth daily      sodium polystyrene (SPS) 15 GM/60ML suspension Take 60 mLs by mouth 2 times daily for 2 doses 120 mL 0     No current facility-administered medications on file prior to visit. History reviewed. No pertinent family history.    Social History     Socioeconomic History    Marital status:      Spouse name: None    Number of children: None    Years of education: None    Highest education level: None   Occupational History    None   Social Needs    Financial resource strain: None    Food insecurity:     Worry: None     Inability: None    Transportation needs:     Medical: None     Non-medical: None   Tobacco Use    Smoking status: Never Smoker    Smokeless tobacco: Never Used   Substance and Sexual Activity    Alcohol use: Never     Frequency: Never    Drug use: None    Sexual activity: None   Lifestyle    Physical activity:     Days per week: None     Minutes per session: None    Stress: None   Relationships    Social connections:     Talks on phone: None     Gets together: None     Attends Buddhist service: None     Active member of club or organization: None     Attends meetings of clubs or organizations: None     Relationship status: None    Intimate partner violence:     Fear of current or ex partner: None     Emotionally abused: None Physically abused: None     Forced sexual activity: None   Other Topics Concern    None   Social History Narrative    None       Pulse 77, height 5' 4\" (1.626 m), weight 201 lb 9.6 oz (91.4 kg), SpO2 98 %. Physical Exam   Constitutional: She appears well-developed and well-nourished. HENT:   Head: Normocephalic and atraumatic. Eyes: Pupils are equal, round, and reactive to light. Conjunctivae and EOM are normal.   Neck: Normal range of motion. Cardiovascular: Normal rate. Pulmonary/Chest: Effort normal.   Abdominal: Soft. Bowel sounds are normal.   Musculoskeletal: Normal range of motion. Neurological: She is alert. Skin: Skin is warm. Psychiatric: She has a normal mood and affect. Laboratory, Pathology, Radiology reviewed indetail with relevant important investigations summarized below:  Lab Results   Component Value Date    WBC 11.6 (H) 04/20/2019    HGB 12.7 04/20/2019    HCT 36.8 (L) 04/20/2019    MCV 72.3 (L) 04/20/2019     04/20/2019     Lab Results   Component Value Date    ALT 17 04/20/2019    AST 28 04/20/2019    ALKPHOS 85 04/20/2019    BILITOT <0.2 04/20/2019       Ct Foot Right W Contrast    Result Date: 4/22/2019  EXAM: CT of the right foot with contrast  HISTORY: Ulcer on the third digit of the right foot. Evaluate for osteomyelitis. TECHNIQUE: Multiple contiguous axial images were obtained of the right foot with contrast. Multiplanar reformats were obtained. COMPARISON: Foot radiographs from April 7, 2019 FINDINGS: No acute or aggressive osseous abnormality. No osseous erosive changes. Moderate plantar calcaneal enthesophyte. Small os peroneum. Mild soft tissue edema of the forefoot, most significant at the level of the fifth metatarsophalangeal joint. Visualized myotendinous structures appear intact by CT. No CT evidence of osteomyelitis. If continued clinical concern process or myelitis MRI is recommended if the patient does not have contraindications.  Small soft tissue defect along the lateral aspect of the lateral aspect of the third toe at the proximal interphalangeal joint compatible with ulcer. Areas of mild soft tissue edema of the forefoot, most significant at the level of the fifth metatarsophalangeal joint. Correlation for cellulitis recommended. All CT scans at this facility use dose modulation, iterative reconstruction, and/or weight based dosing when appropriate to reduce radiation dose to as low as reasonably achievable.  Dup Lower Extremity Right Arteries    Result Date: 4/22/2019  [facility] LOWER EXTREMITY RIGHT ARTERIES: 4/20/2019 CLINICAL HISTORY:  PVD . COMPARISON: None available. Grayscale, color and waveform Doppler analysis of the right lower extremity arterial system was performed. FINDINGS: There are no significantly elevated velocities to suggest a flow-limiting stenosis, or arterial occlusion identified. Moderate atherosclerotic disease is present, with moderately diminished waveforms distally. The arterial system is normal in caliber, without evidence of aneurysm or dissection. Maximum velocities are noted on the included worksheet. MODERATE PREDOMINANTLY DISTAL ATHEROSCLEROTIC DISEASE OF THE RIGHT LOWER EXTREMITY. NO EVIDENCE OF A FLOW-LIMITING STENOSIS, ARTERIAL OCCLUSION, OR ANEURYSM IDENTIFIED. Endoscopic investigations:     Assessmentand Plan:  66 y.o. female with history of rectal bleeding. Also questionable history of chronic hep C. We'll schedule the patient for colonoscopy and check hep C RNA and  antibody. Diagnosis Orders   1. Rectal bleeding  Endoscopy, colon, diagnostic    Hepatitis C RNA, quantitative, PCR     Return in about 3 weeks (around 6/3/2019) for Follow Up Visit.     Juanita Hess MD   Staff Gastroenterologist  [facility]    Please note this report has been partially produced using speech recognition software and may cause contain errors related to thatsystem including grammar, punctuation and spelling as well as words and phrases that may seem inappropriate. If there are questions or concerns please feel free to contact me to clarify.

## 2019-05-14 ENCOUNTER — HOSPITAL ENCOUNTER (OUTPATIENT)
Dept: WOUND CARE | Age: 64
Discharge: HOME | End: 2019-05-14
Payer: MEDICARE

## 2019-05-14 VITALS
RESPIRATION RATE: 18 BRPM | WEIGHT: 201 LBS | HEIGHT: 64 IN | TEMPERATURE: 97.6 F | DIASTOLIC BLOOD PRESSURE: 56 MMHG | HEART RATE: 59 BPM | SYSTOLIC BLOOD PRESSURE: 135 MMHG | BODY MASS INDEX: 34.31 KG/M2

## 2019-05-14 DIAGNOSIS — L97.509 NON-PRESSURE ULCER OF TOE (HCC): ICD-10-CM

## 2019-05-14 DIAGNOSIS — E11.65 TYPE 2 DIABETES MELLITUS WITH HYPERGLYCEMIA, WITH LONG-TERM CURRENT USE OF INSULIN (HCC): ICD-10-CM

## 2019-05-14 DIAGNOSIS — Z79.4 TYPE 2 DIABETES MELLITUS WITH HYPERGLYCEMIA, WITH LONG-TERM CURRENT USE OF INSULIN (HCC): ICD-10-CM

## 2019-05-14 DIAGNOSIS — M86.171 ACUTE OSTEOMYELITIS OF TOE OF RIGHT FOOT (HCC): Chronic | ICD-10-CM

## 2019-05-14 DIAGNOSIS — I70.219 ATHEROSCLEROTIC PVD WITH INTERMITTENT CLAUDICATION (HCC): Primary | Chronic | ICD-10-CM

## 2019-05-14 PROCEDURE — 99212 OFFICE O/P EST SF 10 MIN: CPT

## 2019-05-14 ASSESSMENT — PAIN SCALES - GENERAL: PAINLEVEL_OUTOF10: 0

## 2019-05-14 NOTE — PROGRESS NOTES
[address] [address]                                                   Progress Note and Procedure Note      Garth Mota RECORD NUMBER:  [id number]  AGE: 66 y.o. GENDER: female  : 1955  EPISODE DATE:  2019    Subjective:     Chief Complaint   Patient presents with    Wound Check     right 3rd toe         HISTORY of PRESENT ILLNESS HPI     Mike Barrientos is a 66 y.o. female who presents today for wound/ulcer evaluation. History of Wound Context: Non-healing diabetic ulcer of the right 3rd toe. Hagen stage III. using bactoban on the the wound. BS was 106 mg/dl. She is scheduled to see [facility] today for vascular eval.    Wound/Ulcer Pain Timing/Severity: intermittent  Quality of pain: N/A  Severity:  1 / 10   Modifying Factors: None  Associated Signs/Symptoms: none    Ulcer Identification:  Ulcer Type: diabetic and neuropathic  Contributing Factors: diabetes    Wound: N/A        PAST MEDICAL HISTORY        Diagnosis Date    Diabetes mellitus ([facility]     Hyperlipidemia     Hypertension        PAST SURGICAL HISTORY    History reviewed. No pertinent surgical history. FAMILY HISTORY    History reviewed. No pertinent family history.     SOCIAL HISTORY    Social History     Tobacco Use    Smoking status: Never Smoker    Smokeless tobacco: Never Used   Substance Use Topics    Alcohol use: Never     Frequency: Never    Drug use: Not on file       ALLERGIES    Allergies   Allergen Reactions    Ambien [Zolpidem Tartrate]     Capoten [Captopril]     Clioquinol     Cogentin [Benztropine]     Depakote [Divalproex Sodium]     Effexor Xr [Venlafaxine Hcl Er]     Geodon [Ziprasidone Hcl]     Navane [Thiothixene]     Pamelor [Nortriptyline Hcl]     Remeron [Mirtazapine]     Risperdal [Risperidone]     Trazodone And Nefazodone     Wellbutrin [Bupropion]        MEDICATIONS    Current Outpatient Medications on File Prior to Encounter   Medication Sig Dispense Refill    lisinopril (PRINIVIL;ZESTRIL) 10 MG tablet Take 10 mg by mouth daily      SODIUM CHLORIDE, EXTERNAL, 0.9 % SOLN Apply 1 Applicatorful topically daily 1000 mL 2    sodium polystyrene (SPS) 15 GM/60ML suspension Take 60 mLs by mouth 2 times daily for 2 doses 120 mL 0    ibuprofen (IBU) 400 MG tablet Take 1 tablet by mouth every 6 hours as needed for Pain 20 tablet 0    sertraline (ZOLOFT) 50 MG tablet Take 1 tablet by mouth daily 30 tablet 0    insulin lispro (HUMALOG) 100 UNIT/ML injection vial Inject 0-6 Units into the skin 3 times daily (with meals) 1 vial 3    insulin lispro (HUMALOG) 100 UNIT/ML injection vial Inject 5 Units into the skin 3 times daily (with meals) 1 vial 3    insulin glargine (LANTUS) 100 UNIT/ML injection vial Inject 35 Units into the skin daily 1 vial 3    metoprolol succinate (TOPROL XL) 25 MG extended release tablet Take 1 tablet by mouth daily 30 tablet 3    metoprolol succinate (TOPROL XL) 50 MG extended release tablet Take 1 tablet by mouth daily 30 tablet 3    levothyroxine (SYNTHROID) 50 MCG tablet Take 50 mcg by mouth Daily      furosemide (LASIX) 40 MG tablet Take 40 mg by mouth 2 times daily      simvastatin (ZOCOR) 40 MG tablet Take 40 mg by mouth daily      meclizine (ANTIVERT) 25 MG tablet Take 25 mg by mouth three times daily      metFORMIN (GLUCOPHAGE) 1000 MG tablet Take 1,000 mg by mouth every 12 hours      montelukast (SINGULAIR) 10 MG tablet Take 10 mg by mouth daily       No current facility-administered medications on file prior to encounter. REVIEW OF SYSTEMS    Pertinent items are noted in HPI.     Objective:      BP (!) 135/56   Pulse 59   Temp 97.6 °F (36.4 °C) (Temporal)   Resp 18   Ht 5' 4\" (1.626 m)   Wt 201 lb (91.2 kg)   BMI 34.50 kg/m²     Wt Readings from Last 3 Encounters:   05/14/19 201 lb (91.2 kg)   05/13/19 201 lb 9.6 oz (91.4 kg)   04/30/19 200 lb (90.7 kg)       PHYSICAL EXAM    Constitutional:   Well nourished and well developed. Appears neat and clean. Patient is alert, oriented x3, and in no apparent distress. Respiratory:  Respiratory effort is easy and symmetric bilaterally. Rate is normal at rest and on room air. Vascular:  Pedal Pulses is palpable and audible with doppler. Capillary refill is <3 sec to digits bilateral.  Extremities negativefor  pitting edema. Art duplex positive for distal disease. Neurological:   Sensation is absent to lower extremities. Dermatological:  Wound description noted in wound assessment. The wound(s) are full thickness with a dry black necrotic base today. Positive probe to bone It is stable and dry. No erythema edema or drainage noted,    Psychiatric:  Judgement and insight intact. Short and long term memory intact. No evidence of depression, anxiety, or agitation. Patient is calm, cooperative, and communicative. Appropriate interactions and affect. Assessment:      Problem List Items Addressed This Visit     Atherosclerotic PVD with intermittent claudication ([address] - Primary (Chronic)    Type 2 diabetes mellitus with hyperglycemia, with long-term current use of insulin (HCC)    Non-pressure ulcer of toe ([address]           Procedure Note  Indications:  Based on my examination of this patient's wound(s)/ulcer(s) today, debridement is not required to promote healing and evaluate the wound base. Plan:   Light layer of bactroban  Follow up with [facility] today for testing. It was explained via  that depending on the results of vascular testing, there is a possibility that the toe will need amputation due to level of bone exposure. She understands. Explained treatment through [facility] . Treatment Note please see attached Discharge Instructions    Written patient dismissal instructions given to patient and signed by patient or POA.          Discharge Instructions         Discharge Instructions        [address] [place] [address] Rd  Physician Orders and Discharge Instructions  [facility]  [address] [facility]  [facility]   Telephone: [phone]      [phone]  NAME: Kevin Bobby  : [birth year]     Home Care:  NONE     Wound Location:  RIGHT THIRD TOE     Dressing orders:  1. CLEANSE WOUND WITH NORMAL SALINE. 2. APPLY BACTROBAN TO WOUND.  3.COVER WOUND WITH GAUZE, PLACE GAUZE BETWEEN RIGHT TOES. 4. CHANGE DRESSING EVERY DAY.     Compression:  NONE     Other Instructions: KEEP APPOINTMENTS WITH DR. Anuradha Nixon FOR EVALUATION, WEAR A WIDE SHOE.     Keep all dressings clean & dry.  Keep pressure off the wound(s) at all times. Do not shower, take baths or get wound wet, unless otherwise instructed by your Wound Care doctor.     Follow up visit   2 Weeks MAY 28, 2019 AT 10:15AM     For Diabetic patients keep blood sugars below 150 for optimal wound healing.     If you experience any of the following, please call the Wound Care Service during business hours: [phone]     *Increase in pain         *Temperature over 101           *Increase in drainage from your wound or a foul odor  *Uncontrolled swelling            *Need for compression bandage changes due to slippage, breakthrough drainage     If you need medical attention outside of business hours, please contact your Primary Care Doctor or go to the nearest emergency room. Keep next scheduled appointment. Mireya Jolly give 24 hour notice if unable to keep appointment. [phone]     PLEASE NOTE: IF YOU ARE UNABLE TO OBTAIN WOUND SUPPLIES, CONTINUE TO USE THE SUPPLIES YOU HAVE AVAILABLE UNTIL YOU ARE ABLE TO REACH US.  IT IS MOST IMPORTANT TO KEEP THE WOUND COVERED AT ALL TIMES  Electronically signed by Clinton Goodman DPM on 2019 at 10:57 AM              Electronically signed by Clinton Goodman DPM on 2019 at 11:04 AM

## 2019-05-14 NOTE — CODE DOCUMENTATION
[id number] Physician Billing Sheet. Kiki Crandall  AGE: 66 y.o.    GENDER: female  : 1955  TODAY'S DATE:  2019    ICD-10 CODES  Active Hospital Problems    Diagnosis Date Noted    [facility]el[facility] right foot [facility] [M86.171] 2019    Atherosclerotic PVD with intermittent claudication ([facility] [I70.219] 2019    Non-pressure ulcer of toe ([facility] [L97.509] 2019    Type 2 diabetes mellitus with hyperglycemia, with long-term current use of insulin ([facility] [E11.65, Z79.4] 2019       PHYSICIAN PROCEDURES    CPT CODE  21053      Electronically signed by Chino Negro DPM on 2019 at 11:06 AM

## 2019-05-22 LAB
ANION GAP SERPL CALCULATED.3IONS-SCNC: 14 MMOL/L (ref 10–20)
BICARBONATE: 24 MMOL/L (ref 21–32)
CALCIUM SERPL-MCNC: 9.4 MG/DL (ref 8.6–10.3)
CHLORIDE BLD-SCNC: 105 MMOL/L (ref 98–107)
CREAT SERPL-MCNC: 0.85 MG/DL (ref 0.5–1)
ERYTHROCYTE [DISTWIDTH] IN BLOOD BY AUTOMATED COUNT: 16.2 % (ref 11.5–14)
GFR AFRICAN AMERICAN: >60 ML/MIN/1.73M2
GFR NON-AFRICAN AMERICAN: >60 ML/MIN/1.73M2
GLUCOSE: 131 MG/DL (ref 74–99)
HCT VFR BLD CALC: 36.2 % (ref 36–46)
HEMOGLOBIN: 12 G/DL (ref 12–16)
MCHC RBC AUTO-ENTMCNC: 33.1 G/DL (ref 32–36)
MCV RBC AUTO: 72 FL (ref 80–100)
NUCLEATED RBCS: 0.2 /100 WBC (ref 0–0)
PLATELET # BLD: 243 X10E9/L (ref 150–450)
POTASSIUM SERPL-SCNC: 5.1 MMOL/L (ref 3.5–5.3)
RBC # BLD: 5.01 X10E12/L (ref 4–5.2)
SODIUM BLD-SCNC: 138 MMOL/L (ref 136–145)
UREA NITROGEN: 25 MG/DL (ref 6–23)
WBC: 8.4 X10E9/L (ref 4.4–11.3)

## 2019-05-25 ENCOUNTER — APPOINTMENT (OUTPATIENT)
Dept: GENERAL RADIOLOGY | Age: 64
DRG: 344 | End: 2019-05-25
Payer: MEDICARE

## 2019-05-25 ENCOUNTER — HOSPITAL ENCOUNTER (INPATIENT)
Age: 64
LOS: 6 days | Discharge: ANOTHER HOSPITAL | DRG: 344 | End: 2019-05-31
Attending: EMERGENCY MEDICINE | Admitting: INTERNAL MEDICINE
Payer: MEDICARE

## 2019-05-25 DIAGNOSIS — I10 ESSENTIAL HYPERTENSION: ICD-10-CM

## 2019-05-25 DIAGNOSIS — L97.509 NON-PRESSURE ULCER OF TOE (HCC): ICD-10-CM

## 2019-05-25 DIAGNOSIS — L97.519 SKIN ULCER OF THIRD TOE, RIGHT, WITH UNSPECIFIED SEVERITY (HCC): Primary | ICD-10-CM

## 2019-05-25 DIAGNOSIS — Z79.4 TYPE 2 DIABETES MELLITUS WITH HYPERGLYCEMIA, WITH LONG-TERM CURRENT USE OF INSULIN (HCC): ICD-10-CM

## 2019-05-25 DIAGNOSIS — E11.65 TYPE 2 DIABETES MELLITUS WITH HYPERGLYCEMIA, WITH LONG-TERM CURRENT USE OF INSULIN (HCC): ICD-10-CM

## 2019-05-25 DIAGNOSIS — E78.2 MIXED HYPERLIPIDEMIA: ICD-10-CM

## 2019-05-25 PROBLEM — L08.9 SKIN INFECTION: Status: ACTIVE | Noted: 2019-05-25

## 2019-05-25 LAB
ABO/RH: NORMAL
ALBUMIN SERPL-MCNC: 3.5 G/DL (ref 3.5–4.6)
ALP BLD-CCNC: 78 U/L (ref 40–130)
ALT SERPL-CCNC: 11 U/L (ref 0–33)
ANION GAP SERPL CALCULATED.3IONS-SCNC: 12 MEQ/L (ref 9–15)
ANTIBODY SCREEN: NORMAL
AST SERPL-CCNC: 18 U/L (ref 0–35)
BILIRUB SERPL-MCNC: <0.2 MG/DL (ref 0.2–0.7)
BUN BLDV-MCNC: 31 MG/DL (ref 8–23)
C-REACTIVE PROTEIN: 11.7 MG/L (ref 0–5)
CALCIUM SERPL-MCNC: 9 MG/DL (ref 8.5–9.9)
CHLORIDE BLD-SCNC: 100 MEQ/L (ref 95–107)
CO2: 24 MEQ/L (ref 20–31)
CREAT SERPL-MCNC: 0.84 MG/DL (ref 0.5–0.9)
GFR AFRICAN AMERICAN: >60
GFR NON-AFRICAN AMERICAN: >60
GLOBULIN: 2.9 G/DL (ref 2.3–3.5)
GLUCOSE BLD-MCNC: 206 MG/DL (ref 60–115)
GLUCOSE BLD-MCNC: 231 MG/DL (ref 70–99)
GLUCOSE BLD-MCNC: 259 MG/DL (ref 60–115)
HBA1C MFR BLD: 7 % (ref 4.8–5.9)
HCT VFR BLD CALC: 34.8 % (ref 37–47)
HEMOGLOBIN: 12 G/DL (ref 12–16)
INR BLD: 1
MCH RBC QN AUTO: 24.9 PG (ref 27–31.3)
MCHC RBC AUTO-ENTMCNC: 34.5 % (ref 33–37)
MCV RBC AUTO: 72.3 FL (ref 82–100)
PDW BLD-RTO: 15.9 % (ref 11.5–14.5)
PERFORMED ON: ABNORMAL
PERFORMED ON: ABNORMAL
PLATELET # BLD: 211 K/UL (ref 130–400)
POTASSIUM SERPL-SCNC: 4.7 MEQ/L (ref 3.4–4.9)
PROTHROMBIN TIME: 10.4 SEC (ref 9–11.5)
RBC # BLD: 4.82 M/UL (ref 4.2–5.4)
SEDIMENTATION RATE, ERYTHROCYTE: 35 MM (ref 0–30)
SODIUM BLD-SCNC: 136 MEQ/L (ref 135–144)
TOTAL PROTEIN: 6.4 G/DL (ref 6.3–8)
WBC # BLD: 9.8 K/UL (ref 4.8–10.8)

## 2019-05-25 PROCEDURE — 99285 EMERGENCY DEPT VISIT HI MDM: CPT

## 2019-05-25 PROCEDURE — 86901 BLOOD TYPING SEROLOGIC RH(D): CPT

## 2019-05-25 PROCEDURE — 6360000002 HC RX W HCPCS: Performed by: EMERGENCY MEDICINE

## 2019-05-25 PROCEDURE — 86850 RBC ANTIBODY SCREEN: CPT

## 2019-05-25 PROCEDURE — 93005 ELECTROCARDIOGRAM TRACING: CPT | Performed by: EMERGENCY MEDICINE

## 2019-05-25 PROCEDURE — 87040 BLOOD CULTURE FOR BACTERIA: CPT

## 2019-05-25 PROCEDURE — 80053 COMPREHEN METABOLIC PANEL: CPT

## 2019-05-25 PROCEDURE — 2580000003 HC RX 258: Performed by: INTERNAL MEDICINE

## 2019-05-25 PROCEDURE — 6370000000 HC RX 637 (ALT 250 FOR IP): Performed by: INTERNAL MEDICINE

## 2019-05-25 PROCEDURE — 96374 THER/PROPH/DIAG INJ IV PUSH: CPT

## 2019-05-25 PROCEDURE — 2580000003 HC RX 258: Performed by: PHYSICIAN ASSISTANT

## 2019-05-25 PROCEDURE — 86900 BLOOD TYPING SEROLOGIC ABO: CPT

## 2019-05-25 PROCEDURE — 83036 HEMOGLOBIN GLYCOSYLATED A1C: CPT

## 2019-05-25 PROCEDURE — 1210000000 HC MED SURG R&B

## 2019-05-25 PROCEDURE — 85610 PROTHROMBIN TIME: CPT

## 2019-05-25 PROCEDURE — 36415 COLL VENOUS BLD VENIPUNCTURE: CPT

## 2019-05-25 PROCEDURE — 85652 RBC SED RATE AUTOMATED: CPT

## 2019-05-25 PROCEDURE — 85027 COMPLETE CBC AUTOMATED: CPT

## 2019-05-25 PROCEDURE — 2580000003 HC RX 258: Performed by: EMERGENCY MEDICINE

## 2019-05-25 PROCEDURE — 6360000002 HC RX W HCPCS: Performed by: PHYSICIAN ASSISTANT

## 2019-05-25 PROCEDURE — 6360000002 HC RX W HCPCS: Performed by: INTERNAL MEDICINE

## 2019-05-25 PROCEDURE — 86140 C-REACTIVE PROTEIN: CPT

## 2019-05-25 PROCEDURE — 6370000000 HC RX 637 (ALT 250 FOR IP): Performed by: STUDENT IN AN ORGANIZED HEALTH CARE EDUCATION/TRAINING PROGRAM

## 2019-05-25 PROCEDURE — 73660 X-RAY EXAM OF TOE(S): CPT

## 2019-05-25 RX ORDER — DEXTROSE MONOHYDRATE 25 G/50ML
12.5 INJECTION, SOLUTION INTRAVENOUS PRN
Status: DISCONTINUED | OUTPATIENT
Start: 2019-05-25 | End: 2019-05-31 | Stop reason: HOSPADM

## 2019-05-25 RX ORDER — INSULIN GLARGINE 100 [IU]/ML
35 INJECTION, SOLUTION SUBCUTANEOUS NIGHTLY
Status: DISCONTINUED | OUTPATIENT
Start: 2019-05-25 | End: 2019-05-31 | Stop reason: HOSPADM

## 2019-05-25 RX ORDER — DEXTROSE MONOHYDRATE 50 MG/ML
100 INJECTION, SOLUTION INTRAVENOUS PRN
Status: DISCONTINUED | OUTPATIENT
Start: 2019-05-25 | End: 2019-05-31 | Stop reason: HOSPADM

## 2019-05-25 RX ORDER — NICOTINE POLACRILEX 4 MG
15 LOZENGE BUCCAL PRN
Status: DISCONTINUED | OUTPATIENT
Start: 2019-05-25 | End: 2019-05-31 | Stop reason: HOSPADM

## 2019-05-25 RX ORDER — KETOROLAC TROMETHAMINE 30 MG/ML
30 INJECTION, SOLUTION INTRAMUSCULAR; INTRAVENOUS ONCE
Status: COMPLETED | OUTPATIENT
Start: 2019-05-25 | End: 2019-05-25

## 2019-05-25 RX ORDER — NAPROXEN 500 MG/1
500 TABLET ORAL ONCE
Status: DISCONTINUED | OUTPATIENT
Start: 2019-05-25 | End: 2019-05-25

## 2019-05-25 RX ORDER — SODIUM CHLORIDE 0.9 % (FLUSH) 0.9 %
10 SYRINGE (ML) INJECTION PRN
Status: DISCONTINUED | OUTPATIENT
Start: 2019-05-25 | End: 2019-05-28 | Stop reason: SDUPTHER

## 2019-05-25 RX ORDER — SODIUM CHLORIDE 9 MG/ML
INJECTION, SOLUTION INTRAVENOUS CONTINUOUS
Status: DISCONTINUED | OUTPATIENT
Start: 2019-05-25 | End: 2019-05-31 | Stop reason: HOSPADM

## 2019-05-25 RX ORDER — ONDANSETRON 2 MG/ML
4 INJECTION INTRAMUSCULAR; INTRAVENOUS EVERY 6 HOURS PRN
Status: DISCONTINUED | OUTPATIENT
Start: 2019-05-25 | End: 2019-05-31 | Stop reason: HOSPADM

## 2019-05-25 RX ORDER — SODIUM CHLORIDE 0.9 % (FLUSH) 0.9 %
10 SYRINGE (ML) INJECTION EVERY 12 HOURS SCHEDULED
Status: DISCONTINUED | OUTPATIENT
Start: 2019-05-25 | End: 2019-05-28 | Stop reason: SDUPTHER

## 2019-05-25 RX ADMIN — SODIUM CHLORIDE: 9 INJECTION, SOLUTION INTRAVENOUS at 18:12

## 2019-05-25 RX ADMIN — VANCOMYCIN HYDROCHLORIDE 1000 MG: 1 INJECTION, POWDER, LYOPHILIZED, FOR SOLUTION INTRAVENOUS at 15:40

## 2019-05-25 RX ADMIN — KETOROLAC TROMETHAMINE 30 MG: 30 INJECTION, SOLUTION INTRAMUSCULAR; INTRAVENOUS at 15:06

## 2019-05-25 RX ADMIN — ENOXAPARIN SODIUM 40 MG: 40 INJECTION SUBCUTANEOUS at 21:21

## 2019-05-25 RX ADMIN — VANCOMYCIN HYDROCHLORIDE 1250 MG: 5 INJECTION, POWDER, LYOPHILIZED, FOR SOLUTION INTRAVENOUS at 22:43

## 2019-05-25 RX ADMIN — MUPIROCIN: 20 OINTMENT TOPICAL at 20:14

## 2019-05-25 RX ADMIN — INSULIN GLARGINE 35 UNITS: 100 INJECTION, SOLUTION SUBCUTANEOUS at 21:16

## 2019-05-25 ASSESSMENT — PAIN DESCRIPTION - PAIN TYPE
TYPE: ACUTE PAIN
TYPE: ACUTE PAIN

## 2019-05-25 ASSESSMENT — PAIN DESCRIPTION - LOCATION
LOCATION: FOOT
LOCATION: TOE (COMMENT WHICH ONE)
LOCATION: FOOT

## 2019-05-25 ASSESSMENT — ENCOUNTER SYMPTOMS
WHEEZING: 0
EYE DISCHARGE: 0
VOMITING: 0
PHOTOPHOBIA: 0
ABDOMINAL PAIN: 0
ABDOMINAL DISTENTION: 0
COLOR CHANGE: 0
FACIAL SWELLING: 0
RHINORRHEA: 0
SHORTNESS OF BREATH: 0

## 2019-05-25 ASSESSMENT — PAIN DESCRIPTION - ORIENTATION
ORIENTATION: RIGHT

## 2019-05-25 ASSESSMENT — PAIN SCALES - GENERAL
PAINLEVEL_OUTOF10: 7
PAINLEVEL_OUTOF10: 10
PAINLEVEL_OUTOF10: 8
PAINLEVEL_OUTOF10: 10

## 2019-05-25 ASSESSMENT — PAIN DESCRIPTION - PROGRESSION: CLINICAL_PROGRESSION: GRADUALLY IMPROVING

## 2019-05-25 NOTE — PROGRESS NOTES
Pharmacy Note  Vancomycin Consult    Mukul Rivera is a 66 y.o. female started on Vancomycin for cellulitis/skin ulcer; consult received from [facility], to manage therapy. Also receiving the following antibiotics: none. Patient Active Problem List   Diagnosis    Severe major depression with psychotic features ([facility]    Type 2 diabetes mellitus with hyperglycemia, with long-term current use of insulin (HCC)    HTN (hypertension)    HLD (hyperlipidemia)    Hypothyroid    HF (heart failure) (HCC)    Non-pressure ulcer of toe (HCC)    Atherosclerotic PVD with intermittent claudication (HCC)    Acute osteomyelitis of toe of right foot (HCC)    Skin infection       Allergies:  Ambien [zolpidem tartrate]; Capoten [captopril]; Clioquinol; Cogentin [benztropine]; Depakote [divalproex sodium]; Effexor xr [venlafaxine hcl er]; Geodon [ziprasidone hcl]; Navane [thiothixene]; Pamelor [nortriptyline hcl]; Remeron [mirtazapine]; Risperdal [risperidone]; Trazodone and nefazodone; and Wellbutrin [bupropion]       Recent Labs     05/25/19  1330   BUN 31*       Recent Labs     05/25/19  1330   CREATININE 0.84       Recent Labs     05/25/19  1330   WBC 9.8       No intake or output data in the 24 hours ending 05/25/19 1735    Culture Date      Source                       Results  Wound Culture [[id number]]    Order Status: No result Specimen: Toe    Culture Blood #2 [[id number]] Collected: 05/25/19 1537   Order Status: Sent Specimen: Blood Updated: 05/25/19 1544   Culture Blood #1 [[id number]] Collected: 05/25/19 1535   Order Status: Sent Specimen: Blood Updated: 05/25/19 1544   CULTURE BLOOD #1 [[id number]] Collected: 05/25/19   Order Status: Canceled    CULTURE BLOOD #2 [[id number]] Collected: 05/25/19   Order Status: Canceled        Ht Readings from Last 1 Encounters:   05/25/19 5' 4\" (1.626 m)        Wt Readings from Last 1 Encounters:   05/25/19 198 lb (89.8 kg)         Body mass index is 33.99 kg/m².     Estimated

## 2019-05-25 NOTE — ED NOTES
Resting comfortably. Visitor at bedside. Pt without any other complaints at this time. States her pain is a little better.       Laura Adams RN  05/25/19 2954

## 2019-05-25 NOTE — ED TRIAGE NOTES
Pt a/o x 3 skin pinkw/d resp nonlabored. Pt here today for third toe on 4th toe side turning \"black\". Pt has wound care appointment on the 28th on this month. Pt not on po meds but Bactroban cream. O/e out toe with eschar stage 3-4.

## 2019-05-25 NOTE — PROGRESS NOTES
Seen and examined  See PA h/p for details. Black third right toe. No sign of infection. No sign of RADHA.    Consider JIMI/TBI and peripheral angiograph if not already done  Podiatry eval

## 2019-05-25 NOTE — ED PROVIDER NOTES
Past Medical History:   Diagnosis Date    Asthma     Colitis     Diabetes mellitus [facility]     Hyperlipidemia     Hypertension     [facility]     Thyroid disease          SURGICALHISTORY       Past Surgical History:   Procedure Laterality Date     SECTION      HYSTERECTOMY           CURRENT MEDICATIONS       Previous Medications    FUROSEMIDE (LASIX) 40 MG TABLET    Take 40 mg by mouth 2 times daily    IBUPROFEN (IBU) 400 MG TABLET    Take 1 tablet by mouth every 6 hours as needed for Pain    INSULIN GLARGINE (LANTUS) 100 UNIT/ML INJECTION VIAL    Inject 35 Units into the skin daily    INSULIN LISPRO (HUMALOG) 100 UNIT/ML INJECTION VIAL    Inject 0-6 Units into the skin 3 times daily (with meals)    INSULIN LISPRO (HUMALOG) 100 UNIT/ML INJECTION VIAL    Inject 5 Units into the skin 3 times daily (with meals)    LEVOTHYROXINE (SYNTHROID) 50 MCG TABLET    Take 50 mcg by mouth Daily    LISINOPRIL (PRINIVIL;ZESTRIL) 10 MG TABLET    Take 10 mg by mouth daily    MECLIZINE (ANTIVERT) 25 MG TABLET    Take 25 mg by mouth three times daily    METFORMIN (GLUCOPHAGE) 1000 MG TABLET    Take 1,000 mg by mouth every 12 hours    METOPROLOL SUCCINATE (TOPROL XL) 25 MG EXTENDED RELEASE TABLET    Take 1 tablet by mouth daily    METOPROLOL SUCCINATE (TOPROL XL) 50 MG EXTENDED RELEASE TABLET    Take 1 tablet by mouth daily    MONTELUKAST (SINGULAIR) 10 MG TABLET    Take 10 mg by mouth daily    SERTRALINE (ZOLOFT) 50 MG TABLET    Take 1 tablet by mouth daily    SIMVASTATIN (ZOCOR) 40 MG TABLET    Take 40 mg by mouth daily    SODIUM CHLORIDE, EXTERNAL, 0.9 % SOLN    Apply 1 Applicatorful topically daily    SODIUM POLYSTYRENE (SPS) 15 GM/60ML SUSPENSION    Take 60 mLs by mouth 2 times daily for 2 doses       ALLERGIES     Ambien [zolpidem tartrate]; Capoten [captopril]; Clioquinol; Cogentin [benztropine]; Depakote [divalproex sodium]; Effexor xr [venlafaxine hcl er];  Geodon [ziprasidone hcl]; Navane [thiothixene]; Pamelor [nortriptyline hcl]; Remeron [mirtazapine]; Risperdal [risperidone]; Trazodone and nefazodone; and Wellbutrin [bupropion]    FAMILY HISTORY     History reviewed. No pertinent family history. SOCIAL HISTORY       Social History     Socioeconomic History    Marital status:      Spouse name: None    Number of children: None    Years of education: None    Highest education level: None   Occupational History    None   Social Needs    Financial resource strain: None    Food insecurity:     Worry: None     Inability: None    Transportation needs:     Medical: None     Non-medical: None   Tobacco Use    Smoking status: Never Smoker    Smokeless tobacco: Never Used   Substance and Sexual Activity    Alcohol use: Never     Frequency: Never    Drug use: None    Sexual activity: None   Lifestyle    Physical activity:     Days per week: None     Minutes per session: None    Stress: None   Relationships    Social connections:     Talks on phone: None     Gets together: None     Attends Shinto service: None     Active member of club or organization: None     Attends meetings of clubs or organizations: None     Relationship status: None    Intimate partner violence:     Fear of current or ex partner: None     Emotionally abused: None     Physically abused: None     Forced sexual activity: None   Other Topics Concern    None   Social History Narrative    None       SCREENINGS      @FLOW([id number])@      PHYSICAL EXAM    (up to 7 for level 4, 8 or more for level 5)     ED Triage Vitals [05/25/19 1303]   BP Temp Temp Source Pulse Resp SpO2 Height Weight   (!) 110/51 98.1 °F (36.7 °C) Oral 78 16 98 % 5' 4\" (1.626 m) 198 lb (89.8 kg)       Physical Exam   Constitutional: She is oriented to person, place, and time. She appears well-developed and well-nourished. HENT:   Head: Normocephalic and atraumatic. Eyes: Pupils are equal, round, and reactive to light.  Conjunctivae following components:    CRP 11.7 (*)     All other components within normal limits   PROTIME-INR   TYPE AND SCREEN       All other labs were within normal range or not returned as of this dictation. EMERGENCY DEPARTMENT COURSE and DIFFERENTIAL DIAGNOSIS/MDM:   Vitals:    Vitals:    05/25/19 1303   BP: (!) 110/51   Pulse: 78   Resp: 16   Temp: 98.1 °F (36.7 °C)   TempSrc: Oral   SpO2: 98%   Weight: 198 lb (89.8 kg)   Height: 5' 4\" (1.626 m)       Patient's toe looks as though it is going to be needing amputation for certain. She is started on IV Vanco and the podiatry resident is contacted regarding the progression. Patient will be admitted to the hospital.    MDM    CRITICAL CARE TIME   Total Critical Care time was 0 minutes, excluding separately reportableprocedures. There was a high probability of clinicallysignificant/life threatening deterioration in the patient's condition which required my urgent intervention. CONSULTS:  IP CONSULT TO HOSPITALIST    PROCEDURES:  Unless otherwise noted below, none     Procedures    FINAL IMPRESSION      1. Skin ulcer of third toe, right, with unspecified severity ([address]          DISPOSITION/PLAN   DISPOSITION Decision To Admit 05/25/2019 02:57:11 PM      PATIENT REFERRED TO:  No follow-up provider specified.     DISCHARGE MEDICATIONS:  New Prescriptions    No medications on file          (Please note that portions of this note were completed with a voice recognition program.  Efforts were made to edit the dictations but occasionally words are mis-transcribed.)    Sydnie Kelly DO (electronically signed)  Attending Emergency Physician          Sydnie Kelly DO  05/25/19 7219

## 2019-05-26 ENCOUNTER — APPOINTMENT (OUTPATIENT)
Dept: CT IMAGING | Age: 64
DRG: 344 | End: 2019-05-26
Payer: MEDICARE

## 2019-05-26 ENCOUNTER — APPOINTMENT (OUTPATIENT)
Dept: ULTRASOUND IMAGING | Age: 64
DRG: 344 | End: 2019-05-26
Payer: MEDICARE

## 2019-05-26 ENCOUNTER — APPOINTMENT (OUTPATIENT)
Dept: MRI IMAGING | Age: 64
DRG: 344 | End: 2019-05-26
Payer: MEDICARE

## 2019-05-26 LAB
ANION GAP SERPL CALCULATED.3IONS-SCNC: 9 MEQ/L (ref 9–15)
BASOPHILS ABSOLUTE: 0 K/UL (ref 0–0.2)
BASOPHILS RELATIVE PERCENT: 0.5 %
BUN BLDV-MCNC: 22 MG/DL (ref 8–23)
CALCIUM SERPL-MCNC: 8.6 MG/DL (ref 8.5–9.9)
CHLORIDE BLD-SCNC: 107 MEQ/L (ref 95–107)
CO2: 25 MEQ/L (ref 20–31)
CREAT SERPL-MCNC: 0.75 MG/DL (ref 0.5–0.9)
EOSINOPHILS ABSOLUTE: 0.2 K/UL (ref 0–0.7)
EOSINOPHILS RELATIVE PERCENT: 2.8 %
GFR AFRICAN AMERICAN: >60
GFR NON-AFRICAN AMERICAN: >60
GLUCOSE BLD-MCNC: 100 MG/DL (ref 70–99)
GLUCOSE BLD-MCNC: 122 MG/DL (ref 60–115)
GLUCOSE BLD-MCNC: 153 MG/DL (ref 60–115)
GLUCOSE BLD-MCNC: 94 MG/DL (ref 60–115)
GLUCOSE BLD-MCNC: 96 MG/DL (ref 60–115)
HCT VFR BLD CALC: 33.3 % (ref 37–47)
HEMOGLOBIN: 11.5 G/DL (ref 12–16)
LACTIC ACID: 0.8 MMOL/L (ref 0.5–2.2)
LYMPHOCYTES ABSOLUTE: 2.5 K/UL (ref 1–4.8)
LYMPHOCYTES RELATIVE PERCENT: 31.8 %
MCH RBC QN AUTO: 25 PG (ref 27–31.3)
MCHC RBC AUTO-ENTMCNC: 34.5 % (ref 33–37)
MCV RBC AUTO: 72.4 FL (ref 82–100)
MONOCYTES ABSOLUTE: 0.7 K/UL (ref 0.2–0.8)
MONOCYTES RELATIVE PERCENT: 8.4 %
NEUTROPHILS ABSOLUTE: 4.4 K/UL (ref 1.4–6.5)
NEUTROPHILS RELATIVE PERCENT: 56.5 %
PDW BLD-RTO: 16.2 % (ref 11.5–14.5)
PERFORMED ON: ABNORMAL
PERFORMED ON: ABNORMAL
PERFORMED ON: NORMAL
PERFORMED ON: NORMAL
PLATELET # BLD: 203 K/UL (ref 130–400)
POTASSIUM REFLEX MAGNESIUM: 4.4 MEQ/L (ref 3.4–4.9)
RBC # BLD: 4.6 M/UL (ref 4.2–5.4)
SODIUM BLD-SCNC: 141 MEQ/L (ref 135–144)
VANCOMYCIN TROUGH: 20.7 UG/ML (ref 10–20)
WBC # BLD: 7.8 K/UL (ref 4.8–10.8)

## 2019-05-26 PROCEDURE — 6360000002 HC RX W HCPCS: Performed by: INTERNAL MEDICINE

## 2019-05-26 PROCEDURE — 2580000003 HC RX 258: Performed by: PHYSICIAN ASSISTANT

## 2019-05-26 PROCEDURE — 6360000002 HC RX W HCPCS: Performed by: PHYSICIAN ASSISTANT

## 2019-05-26 PROCEDURE — 6370000000 HC RX 637 (ALT 250 FOR IP): Performed by: INTERNAL MEDICINE

## 2019-05-26 PROCEDURE — 36415 COLL VENOUS BLD VENIPUNCTURE: CPT

## 2019-05-26 PROCEDURE — 99222 1ST HOSP IP/OBS MODERATE 55: CPT | Performed by: INTERNAL MEDICINE

## 2019-05-26 PROCEDURE — 83605 ASSAY OF LACTIC ACID: CPT

## 2019-05-26 PROCEDURE — 6360000004 HC RX CONTRAST MEDICATION: Performed by: INTERNAL MEDICINE

## 2019-05-26 PROCEDURE — 1210000000 HC MED SURG R&B

## 2019-05-26 PROCEDURE — 80202 ASSAY OF VANCOMYCIN: CPT

## 2019-05-26 PROCEDURE — 97162 PT EVAL MOD COMPLEX 30 MIN: CPT

## 2019-05-26 PROCEDURE — 73720 MRI LWR EXTREMITY W/O&W/DYE: CPT

## 2019-05-26 PROCEDURE — A9579 GAD-BASE MR CONTRAST NOS,1ML: HCPCS | Performed by: STUDENT IN AN ORGANIZED HEALTH CARE EDUCATION/TRAINING PROGRAM

## 2019-05-26 PROCEDURE — 75635 CT ANGIO ABDOMINAL ARTERIES: CPT

## 2019-05-26 PROCEDURE — 80048 BASIC METABOLIC PNL TOTAL CA: CPT

## 2019-05-26 PROCEDURE — 2580000003 HC RX 258: Performed by: INTERNAL MEDICINE

## 2019-05-26 PROCEDURE — 85025 COMPLETE CBC W/AUTO DIFF WBC: CPT

## 2019-05-26 PROCEDURE — 93005 ELECTROCARDIOGRAM TRACING: CPT | Performed by: INTERNAL MEDICINE

## 2019-05-26 PROCEDURE — 6360000004 HC RX CONTRAST MEDICATION: Performed by: STUDENT IN AN ORGANIZED HEALTH CARE EDUCATION/TRAINING PROGRAM

## 2019-05-26 PROCEDURE — 93880 EXTRACRANIAL BILAT STUDY: CPT

## 2019-05-26 RX ORDER — MONTELUKAST SODIUM 10 MG/1
10 TABLET ORAL DAILY
Status: DISCONTINUED | OUTPATIENT
Start: 2019-05-26 | End: 2019-05-31 | Stop reason: HOSPADM

## 2019-05-26 RX ORDER — LEVOTHYROXINE SODIUM 0.05 MG/1
50 TABLET ORAL DAILY
Status: DISCONTINUED | OUTPATIENT
Start: 2019-05-26 | End: 2019-05-31 | Stop reason: HOSPADM

## 2019-05-26 RX ORDER — METOPROLOL SUCCINATE 50 MG/1
50 TABLET, EXTENDED RELEASE ORAL DAILY
Status: DISCONTINUED | OUTPATIENT
Start: 2019-05-26 | End: 2019-05-29

## 2019-05-26 RX ORDER — FUROSEMIDE 40 MG/1
40 TABLET ORAL 2 TIMES DAILY
Status: DISCONTINUED | OUTPATIENT
Start: 2019-05-26 | End: 2019-05-31 | Stop reason: HOSPADM

## 2019-05-26 RX ORDER — KETOROLAC TROMETHAMINE 15 MG/ML
15 INJECTION, SOLUTION INTRAMUSCULAR; INTRAVENOUS EVERY 6 HOURS PRN
Status: DISPENSED | OUTPATIENT
Start: 2019-05-26 | End: 2019-05-27

## 2019-05-26 RX ORDER — ATORVASTATIN CALCIUM 40 MG/1
40 TABLET, FILM COATED ORAL NIGHTLY
Status: DISCONTINUED | OUTPATIENT
Start: 2019-05-26 | End: 2019-05-29

## 2019-05-26 RX ORDER — LISINOPRIL 10 MG/1
10 TABLET ORAL DAILY
Status: DISCONTINUED | OUTPATIENT
Start: 2019-05-26 | End: 2019-05-31 | Stop reason: HOSPADM

## 2019-05-26 RX ORDER — MECLIZINE HYDROCHLORIDE 25 MG/1
25 TABLET ORAL 3 TIMES DAILY
Status: DISCONTINUED | OUTPATIENT
Start: 2019-05-26 | End: 2019-05-31 | Stop reason: HOSPADM

## 2019-05-26 RX ADMIN — VANCOMYCIN HYDROCHLORIDE 1250 MG: 5 INJECTION, POWDER, LYOPHILIZED, FOR SOLUTION INTRAVENOUS at 11:30

## 2019-05-26 RX ADMIN — ENOXAPARIN SODIUM 40 MG: 40 INJECTION SUBCUTANEOUS at 09:45

## 2019-05-26 RX ADMIN — MECLIZINE HYDROCHLORIDE 25 MG: 25 TABLET ORAL at 11:24

## 2019-05-26 RX ADMIN — FUROSEMIDE 40 MG: 40 TABLET ORAL at 15:39

## 2019-05-26 RX ADMIN — MECLIZINE HYDROCHLORIDE 25 MG: 25 TABLET ORAL at 20:54

## 2019-05-26 RX ADMIN — Medication 10 ML: at 21:00

## 2019-05-26 RX ADMIN — VANCOMYCIN HYDROCHLORIDE 1250 MG: 5 INJECTION, POWDER, LYOPHILIZED, FOR SOLUTION INTRAVENOUS at 22:26

## 2019-05-26 RX ADMIN — MONTELUKAST 10 MG: 10 TABLET, FILM COATED ORAL at 11:24

## 2019-05-26 RX ADMIN — SERTRALINE HYDROCHLORIDE 50 MG: 50 TABLET ORAL at 11:23

## 2019-05-26 RX ADMIN — IOPAMIDOL 100 ML: 755 INJECTION, SOLUTION INTRAVENOUS at 15:04

## 2019-05-26 RX ADMIN — LEVOTHYROXINE SODIUM 50 MCG: 50 TABLET ORAL at 11:23

## 2019-05-26 RX ADMIN — INSULIN LISPRO 2 UNITS: 100 INJECTION, SOLUTION INTRAVENOUS; SUBCUTANEOUS at 17:24

## 2019-05-26 RX ADMIN — ATORVASTATIN CALCIUM 40 MG: 40 TABLET, FILM COATED ORAL at 20:54

## 2019-05-26 RX ADMIN — KETOROLAC TROMETHAMINE 15 MG: 15 INJECTION, SOLUTION INTRAMUSCULAR; INTRAVENOUS at 11:23

## 2019-05-26 RX ADMIN — NITROGLYCERIN 1 INCH: 20 OINTMENT TOPICAL at 15:40

## 2019-05-26 RX ADMIN — Medication 10 ML: at 11:24

## 2019-05-26 RX ADMIN — Medication 10 ML: at 09:46

## 2019-05-26 RX ADMIN — LISINOPRIL 10 MG: 10 TABLET ORAL at 11:24

## 2019-05-26 RX ADMIN — NITROGLYCERIN 1 INCH: 20 OINTMENT TOPICAL at 20:54

## 2019-05-26 RX ADMIN — INSULIN GLARGINE 35 UNITS: 100 INJECTION, SOLUTION SUBCUTANEOUS at 22:05

## 2019-05-26 RX ADMIN — GADOTERIDOL 18 ML: 279.3 INJECTION, SOLUTION INTRAVENOUS at 14:02

## 2019-05-26 RX ADMIN — SODIUM CHLORIDE: 9 INJECTION, SOLUTION INTRAVENOUS at 11:30

## 2019-05-26 RX ADMIN — MUPIROCIN: 20 OINTMENT TOPICAL at 09:35

## 2019-05-26 RX ADMIN — METOPROLOL SUCCINATE 50 MG: 50 TABLET, EXTENDED RELEASE ORAL at 11:23

## 2019-05-26 ASSESSMENT — PAIN SCALES - GENERAL
PAINLEVEL_OUTOF10: 4
PAINLEVEL_OUTOF10: 10

## 2019-05-26 ASSESSMENT — PAIN DESCRIPTION - PAIN TYPE: TYPE: ACUTE PAIN

## 2019-05-26 ASSESSMENT — PAIN DESCRIPTION - LOCATION: LOCATION: FOOT

## 2019-05-26 ASSESSMENT — ENCOUNTER SYMPTOMS
SHORTNESS OF BREATH: 0
GASTROINTESTINAL NEGATIVE: 1
COLOR CHANGE: 0

## 2019-05-26 ASSESSMENT — PAIN DESCRIPTION - ORIENTATION: ORIENTATION: RIGHT

## 2019-05-26 ASSESSMENT — PAIN DESCRIPTION - PROGRESSION: CLINICAL_PROGRESSION: GRADUALLY IMPROVING

## 2019-05-26 NOTE — PROGRESS NOTES
Admit Date: 5/25/2019    Subjective:     No new complains  Went for MRI of the foot and CTA run off    Scheduled Meds:   furosemide  40 mg Oral BID    levothyroxine  50 mcg Oral Daily    lisinopril  10 mg Oral Daily    meclizine  25 mg Oral TID    metoprolol succinate  50 mg Oral Daily    montelukast  10 mg Oral Daily    sertraline  50 mg Oral Daily    atorvastatin  40 mg Oral Nightly    nitroglycerin  1 inch Topical 3 times per day    sodium chloride flush  10 mL Intravenous 2 times per day    enoxaparin  40 mg Subcutaneous Daily    vancomycin  1,250 mg Intravenous Q12H    vancomycin (VANCOCIN) intermittent dosing (placeholder)   Other RX Placeholder    mupirocin   Topical Daily    insulin glargine  35 Units Subcutaneous Nightly    insulin lispro  0-12 Units Subcutaneous TID WC    insulin lispro  0-6 Units Subcutaneous Nightly     Continuous Infusions:   sodium chloride 75 mL/hr at 05/26/19 1130    dextrose      dextrose       PRN Meds:ketorolac, sodium chloride flush, magnesium hydroxide, ondansetron, glucose, dextrose, glucagon (rDNA), dextrose, glucose, dextrose, glucagon (rDNA), dextrose      Objective:     Patient Vitals for the past 8 hrs:   BP Temp Temp src Pulse Resp SpO2   05/26/19 0815 (!) 123/46 98.1 °F (36.7 °C) Oral 74 18 97 %     I/O last 3 completed shifts: In: 3614 [P.O.:360; I.V.:1150]  Out: 900 [Urine:900]  No intake/output data recorded. Gen: a O3 NAD  CVS: reg s1 s2   Chest: no wheezing  ABD: soft NT ND   LE: no edema  Foot: bluish right third toe.  Foot warm      Data Review  CBC:   Lab Results   Component Value Date    WBC 7.8 05/26/2019    RBC 4.60 05/26/2019     BMP:   Lab Results   Component Value Date    GLUCOSE 100 05/26/2019    GLUCOSE 131 05/22/2019    CO2 25 05/26/2019    BUN 22 05/26/2019    CREATININE 0.75 05/26/2019    CALCIUM 8.6 05/26/2019     Coagulation:   Lab Results   Component Value Date    INR 1.0 05/25/2019     Cardiac markers: No results found for:

## 2019-05-26 NOTE — PROGRESS NOTES
Physical Therapy Med Surg Initial Assessment  Fac[facility]Department[facility]  Room: [place] [address]       NAME: Tejas Ramos  : 1955 (86 y.o.)  MRN: [id number]  CODE STATUS: Full Code    Date of Service: 2019    Patient Diagnosis(es): Skin infection [L08.9]   Chief Complaint   Patient presents with    Toe Pain     right 4th toe pain/numbness x 3 months. States toe is black     Patient Active Problem List    Diagnosis Date Noted    Skin infection 2019    Acute osteomyelitis of toe of right foot ([address] 2019    Atherosclerotic PVD with intermittent claudication [facility] 2019    Non-pressure ulcer of toe ([facility] 2019    Type 2 diabetes mellitus with hyperglycemia, with long-term current use of insulin ([facility] 2019    HTN (hypertension) 2019    HLD (hyperlipidemia) 2019    Hypothyroid 2019    HF (heart failure) ([address] 2019    Severe major depression with psychotic features ([facility] 2019        Past Medical History:   Diagnosis Date    Asthma     Colitis     Diabetes mellitus ([address]     Hyperlipidemia     Hypertension     PVD (peripheral vascular disease) ([facility]     Thyroid disease      Past Surgical History:   Procedure Laterality Date     SECTION      HYSTERECTOMY         Chart Reviewed: Yes  Additional Pertinent Hx: DM, HTN, PVD  Family / Caregiver Present: No    Restrictions:        SUBJECTIVE: Subjective: Pt primarily Kenyan speaking utilized interpretor phone to obtain history and functional status #[id number]. Pt agreeable to PT evaluation.    Pre Treatment Pain Screening  Pain at present: 10  Intervention List: Patient able to continue with treatment    Post Treatment Pain Screening:   Pain Screening  Patient Currently in Pain: Yes  Pain Assessment  Pain Level: 10  Pain Type: Acute pain  Pain Location: Foot  Pain Orientation: Right  Clinical Progression: Gradually improving    Prior Level of Function:  Social/Functional History  Lives With: Spouse  Type of Home: Apartment  Home Layout: One level(5th floor apt with elevator )  Home Access: Level entry  ADL Assistance: Independent  Homemaking Assistance: Independent  Homemaking Responsibilities: Yes  Ambulation Assistance: Independent  Transfer Assistance: Independent    OBJECTIVE:   Vision/Hearing:  Vision: Within Functional Limits  Hearing: Within functional limits    Cognition:  Overall Orientation Status: Within Functional Limits  Follows Commands: Within Functional Limits    Observation/Palpation  Observation: dressing intact Lt toes     ROM:  RLE AROM: WFL  LLE AROM : WFL  LLE General AROM: toes NT   RUE AROM : WFL  LUE AROM : WFL    Strength:  Strength RLE  Comment: 4- to 4/5 grossly   Strength LLE  Comment: 4- to 4/5 grossly   Strength RUE  Comment: 3+/5 grossly   Strength LUE  Comment: 3+/5 grossly     Neuro:  Balance  Sitting - Static: Good  Sitting - Dynamic: Good  Standing - Static: Fair;+  Standing - Dynamic: Fair;+        Sensation  Overall Sensation Status: Impaired    Bed mobility  Rolling to Left: Independent  Rolling to Right: Independent  Supine to Sit: Independent  Sit to Supine: Independent    Transfers  Sit to Stand: Stand by assistance  Stand to sit: Stand by assistance    Ambulation  Ambulation?: Yes  Ambulation 1  Surface: level tile  Device: Small [employer]  Assistance: Supervision;Stand by assistance  Quality of Gait: pt with decreased Lt stance time  Gait Deviations: Slow Jennifer;Decreased step length  Distance: 50'   Comments: recommend ww to improve gait quality, pt reports she prefers QC at this time   Stairs/Curb  Stairs?: No    Activity Tolerance  Activity Tolerance: Patient Tolerated treatment well          ASSESSMENT:   Body structures, Functions, Activity limitations: Decreased functional mobility ; Decreased strength;Decreased endurance; Increased Pain;Decreased balance  Decision Making: Medium Complexity  History: 10:57 AM

## 2019-05-26 NOTE — CONSULTS
PODIATRIC MEDICINE AND SURGERY  CONSULT HISTORY AND PHYSICAL      Consulting Service:  Medicine   Requesting Provider: An CORONA  Opinion/advice regarding: Right third toe ulcer  Staff Doctor:  Meera    ASSESSMENT:  This 66 y.o. female with significant PMH of DM2, HTN, HLD, HF, PVD presents with ischemic ulcer grade 3 of the right 3rd toe. No drainage at this time, along with no increased edema or erythema of the foot or leg. Wound does probe close to the PIPJ of the toe, MRI ordered to r/o OM. ID and IC [facility]) consults placed. No acute podiatric intervention at this time, will re-assess following MRI, and IC work up. PLAN AND RECOMMENDATIONS[de-identified]  Patient examined and evaluated  Previous and today's labs reviewed   Prior imaging reviewed   Weight bearing as tolerated to RLE  Patient should elevate BLE at or above heart level while resting  Dressing change today consisted of Bactroban to wound bed with dry 2x2 to the webspace. Changed daily. Dressing change orders placed for Bactroban to wound bed with dry 2x2 to the webspace. Changed daily. Consult order placed for ID and IC [facility])  MRI of right foot ordered   Discussed with patient no surgery at this time, will re-evaluate following MRI. Antibiotic coverage per primary team or ID recommendations   Pain management per primary team   Patient to be discussed with staff, Dr. Ela Bunn, who will provide final recommendations going forward. Podiatry to follow while in house   Patient will need follow up after discharge with Dr. Ela Bunn 1 week following hospital.      HPI: This very pleasant 66y.o. year old female with significant PMH of DM2, HTN, HLD, HF, PVD seen today for ulcer right 3rd toe. Patient is Ecuadorean speaking and use of phone  to obtain history. Patient states that her ulcer has started draining a clear fluid since last seeing Dr. Ela Bunn.   Patient states she saw a cardiologist for her circulation and was unsure what the plan of care for her PAD. Admits to applying the Bactroban daily to the wound with the help of her . Denies any redness or swelling of the foot. Patient states that their pain is 8/10 on the pain scale and is sharp in nature. No other pedal complaint at this time. .      Patient denies nausea, vomiting, diarrhea, fevers, chills, chest pain, shortness of breath, head ache, or calf pain. No other pedal complaints. Past Medical History:   Diagnosis Date    Asthma     Colitis     [facility] mellitus [facility] [address]     Hyperlipidemia     Hypertension     PVD (peripheral vascular disease) ([facility] [address] [facility]     Thyroid disease        Past Surgical History:   Procedure Laterality Date     SECTION      HYSTERECTOMY         No current facility-administered medications on file prior to encounter.       Current Outpatient Medications on File Prior to Encounter   Medication Sig Dispense Refill    lisinopril (PRINIVIL;ZESTRIL) 10 MG tablet Take 10 mg by mouth daily      ibuprofen (IBU) 400 MG tablet Take 1 tablet by mouth every 6 hours as needed for Pain 20 tablet 0    sertraline (ZOLOFT) 50 MG tablet Take 1 tablet by mouth daily 30 tablet 0    insulin lispro (HUMALOG) 100 UNIT/ML injection vial Inject 0-6 Units into the skin 3 times daily (with meals) 1 vial 3    insulin lispro (HUMALOG) 100 UNIT/ML injection vial Inject 5 Units into the skin 3 times daily (with meals) 1 vial 3    insulin glargine (LANTUS) 100 UNIT/ML injection vial Inject 35 Units into the skin daily 1 vial 3    metoprolol succinate (TOPROL XL) 50 MG extended release tablet Take 1 tablet by mouth daily 30 tablet 3    levothyroxine (SYNTHROID) 50 MCG tablet Take 50 mcg by mouth Daily      furosemide (LASIX) 40 MG tablet Take 40 mg by mouth 2 times daily      simvastatin (ZOCOR) 40 MG tablet Take 40 mg by mouth daily      meclizine (ANTIVERT) 25 MG tablet Take 25 mg by mouth three times daily      metFORMIN (GLUCOPHAGE) 1000 MG tablet Take 1,000 mg by mouth every 12 hours      montelukast (SINGULAIR) 10 MG tablet Take 10 mg by mouth daily      SODIUM CHLORIDE, EXTERNAL, 0.9 % SOLN Apply 1 Applicatorful topically daily 1000 mL 2    sodium polystyrene (SPS) 15 GM/60ML suspension Take 60 mLs by mouth 2 times daily for 2 doses 120 mL 0    metoprolol succinate (TOPROL XL) 25 MG extended release tablet Take 1 tablet by mouth daily 30 tablet 3       Allergies   Allergen Reactions    Ambien [Zolpidem Tartrate]     Capoten [Captopril]     Clioquinol     Cogentin [Benztropine]     Depakote [Divalproex Sodium]     Effexor Xr [Venlafaxine Hcl Er]     Geodon [Ziprasidone Hcl]     Navane [Thiothixene]     Pamelor [Nortriptyline Hcl]     Remeron [Mirtazapine]     Risperdal [Risperidone]     Trazodone And Nefazodone     Wellbutrin [Bupropion]        History reviewed. No pertinent family history.     Social History     Socioeconomic History    Marital status:      Spouse name: Not on file    Number of children: Not on file    Years of education: Not on file    Highest education level: Not on file   Occupational History    Not on file   Social Needs    Financial resource strain: Not on file    Food insecurity:     Worry: Not on file     Inability: Not on file    Transportation needs:     Medical: Not on file     Non-medical: Not on file   Tobacco Use    Smoking status: Never Smoker    Smokeless tobacco: Never Used   Substance and Sexual Activity    Alcohol use: Never     Frequency: Never    Drug use: Not on file    Sexual activity: Not on file   Lifestyle    Physical activity:     Days per week: Not on file     Minutes per session: Not on file    Stress: Not on file   Relationships    Social connections:     Talks on phone: Not on file     Gets together: Not on file     Attends Scientology service: Not on file     Active member of club or organization: Not on file     Attends meetings of clubs or organizations: Not on file     Relationship status: Not on file    Intimate partner violence:     Fear of current or ex partner: Not on file     Emotionally abused: Not on file     Physically abused: Not on file     Forced sexual activity: Not on file   Other Topics Concern    Not on file   Social History Narrative    Not on file       Review of Systems  CONSTITUTIONAL: No fevers, chills, diaphoresis  HEENT: Denies epistaxis or tinnitus  EYES: No diplopia or blurry vision. CARDIOVASCULAR: No chest pain, dyspnea, palpitations, orthopnea, PND, ankle edema. PULM: No dyspnea, tachypnea, wheezing  GI: No dysphagia/odynophagia, constipation, diarrhea, changes in stool habits, hematochezia, melena. : No new urinary complaints, including dysuria, gross hematuria or pyuria. NEURO: No new balance problems, peripheral weakness/paresthesias or numbness of concern. MUSC-SKEL: admits to pain in right foot. See below  PSY: No concerns regarding depression, anxiety or panic. INTEGUMENTARY: admits to open skin lesion right foot. See below    OBJECTIVE:  BP (!) 158/68   Pulse 81   Temp 97.7 °F (36.5 °C) (Oral)   Resp 16   Ht 5' 4\" (1.626 m)   Wt 198 lb (89.8 kg)   SpO2 100%   BMI 33.99 kg/m²   Patient is alert and oriented x 3 in NAD. Vascular:   Non Palpable Dorsalis Pedis and non Palpable Posterior Tibial Pulses B/L   Capillary Fill time < 5 seconds to B/L digits  Skin temperature warm to cool tibial tuberosity to the digits B/L  Hair growth present to digits  mild edema, no varicosities     Neurological:   Epicritic sensation intact B/L  Protective sensation via monofilament testing intact B/L  Sharp/dull sensation intact to plantar foot B/L    Musculoskeletal/Orthopaedic:   Structural Deformities: decreased medial longitudinal arch   5/5 muscle strength Dorsiflexion, Plantarflexion, Inversion, Eversion B/L  ROM decreased pedal and ankle joints B/L.   + pain on palpation to 3rd toe of the right foot.     Dermatological:   Skin appears well hydrated and supple with good temperature, texture, turgor. Hyperkeratosis not noted. Nails 1-5 B/L appear wnl for length color and thickness. Interspaces 1-4 B/L are clear and without debris.  + Open lesions present to Right foot as described below. Ulceration #1:   Location: Lateral aspect of 3rd toe right foot  Measurements: 1.2 cm x 0.8 cm x 0.3 cm  Base: fibro-necrotic tissue  Borders: + extensive hyperkeratosis and macerated tissue. Exudate: no drainage   Comments: No periwound erythema and edema beyond wound borders with no evidence of ascending lymphangitis. Probes to PIPJ of the toe    LABS:   Lab Results   Component Value Date    WBC 9.8 05/25/2019    HGB 12.0 05/25/2019    HCT 34.8 (L) 05/25/2019    MCV 72.3 (L) 05/25/2019     05/25/2019     Lab Results   Component Value Date     05/25/2019    K 4.7 05/25/2019    K 5.0 02/23/2019     05/25/2019    CO2 24 05/25/2019    BUN 31 05/25/2019    CREATININE 0.84 05/25/2019    GLUCOSE 231 05/25/2019    GLUCOSE 131 05/22/2019    CALCIUM 9.0 05/25/2019      Lab Results   Component Value Date    LABALBU 3.5 05/25/2019     Lab Results   Component Value Date    SEDRATE 35 (H) 05/25/2019     Lab Results   Component Value Date    CRP 11.7 (H) 05/25/2019     Lab Results   Component Value Date    LABA1C 7.0 (H) 05/25/2019     No results found for: EAG    MICROBIOLOGY:   N/a - wound is dry      IMAGING:   X-ray of Right toes as read by Radiologist on 5/25/19     FINDINGS: 3 views of the right third toe demonstrate mild osteoarthritis involving the interphalangeal joints in the right third toe. No fracture or dislocation involving the right third toe. There is no focal bone erosion or plain film evidence of    osteomyelitis involving the right third toe.      MRI right foot ordered     Arterial Duplex of the Right Leg as read by Cardiologist on 4/20/19  Impression       MODERATE PREDOMINANTLY DISTAL ATHEROSCLEROTIC DISEASE OF THE RIGHT LOWER EXTREMITY.       NO EVIDENCE OF A FLOW-LIMITING STENOSIS, ARTERIAL OCCLUSION, OR ANEURYSM IDENTIFIED.               Patient's case will be discussed with staff, who will provide final recommendations. Thank you for the consult.     Mariam Travis, PGY2  Please first page [facility], [phone]  May 26, 2019  7:33 AM

## 2019-05-26 NOTE — PROGRESS NOTES
Spoke with patient about tests with MRI and ct scan of right leg for circulation. Also spoke with her about treatment of the infection of her right foot. Used interperater phone for assistance.

## 2019-05-26 NOTE — CONSULTS
Infectious Diseases Inpatient Consult Note      Reason for Consult:   Toe gangrene  Requesting Physician:   Maisha Haji CNP  Primary Care Physician:  Bridget Chávez  History Obtained From:   Pt, EPIC    Admit Date: 2019  Hospital Day: 2      HISTORY OF PRESENT ILLNESS:  This is a 66 y.o. female was admitted to [facility]  from home  through ER with worsening black discoloration of R 3rd toe, was supposed to get it amputated. Was found to have severe PAD by PVR. Has mild pain. Was admitted and was started on IV Vanco and is getting evaluated for possible revascularization. CHIEF COMPLAINT:       Past Medical History:   Diagnosis Date    Asthma     Colitis     [facility] [address]     Hyperlipidemia     Hypertension     PVD (peripheral vascular disease) ([facility] [address]     Thyroid disease        Past Surgical History:   Procedure Laterality Date     SECTION      HYSTERECTOMY         Current Medications:     furosemide  40 mg Oral BID    levothyroxine  50 mcg Oral Daily    lisinopril  10 mg Oral Daily    meclizine  25 mg Oral TID    metoprolol succinate  50 mg Oral Daily    montelukast  10 mg Oral Daily    sertraline  50 mg Oral Daily    atorvastatin  40 mg Oral Nightly    nitroglycerin  1 inch Topical 3 times per day    sodium chloride flush  10 mL Intravenous 2 times per day    enoxaparin  40 mg Subcutaneous Daily    vancomycin  1,250 mg Intravenous Q12H    vancomycin (VANCOCIN) intermittent dosing (placeholder)   Other RX Placeholder    mupirocin   Topical Daily    insulin glargine  35 Units Subcutaneous Nightly    insulin lispro  0-12 Units Subcutaneous TID WC    insulin lispro  0-6 Units Subcutaneous Nightly       Allergies:  Ambien [zolpidem tartrate]; Capoten [captopril]; Clioquinol; Cogentin [benztropine]; Depakote [divalproex sodium]; Effexor xr [venlafaxine hcl er]; Geodon [ziprasidone hcl]; Navane [thiothixene]; Pamelor [nortriptyline hcl]; Remeron [mirtazapine];  Risperdal [risperidone]; Trazodone and nefazodone; and Wellbutrin [bupropion]    Social History     Socioeconomic History    Marital status:      Spouse name: Not on file    Number of children: Not on file    Years of education: Not on file    Highest education level: Not on file   Occupational History    Not on file   Social Needs    Financial resource strain: Not on file    Food insecurity:     Worry: Not on file     Inability: Not on file    Transportation needs:     Medical: Not on file     Non-medical: Not on file   Tobacco Use    Smoking status: Never Smoker    Smokeless tobacco: Never Used   Substance and Sexual Activity    Alcohol use: Never     Frequency: Never    Drug use: Not on file    Sexual activity: Not on file   Lifestyle    Physical activity:     Days per week: Not on file     Minutes per session: Not on file    Stress: Not on file   Relationships    Social connections:     Talks on phone: Not on file     Gets together: Not on file     Attends Scientology service: Not on file     Active member of club or organization: Not on file     Attends meetings of clubs or organizations: Not on file     Relationship status: Not on file    Intimate partner violence:     Fear of current or ex partner: Not on file     Emotionally abused: Not on file     Physically abused: Not on file     Forced sexual activity: Not on file   Other Topics Concern    Not on file   Social History Narrative    Not on file         Family History:   History reviewed. No pertinent family history. Review of Systems   Constitutional: Negative for fever. HENT: Negative for mouth sores. Respiratory: Negative for shortness of breath. Cardiovascular: Negative for leg swelling. Gastrointestinal: Negative. Musculoskeletal: Positive for arthralgias (mild R foot pain). Skin: Positive for wound. Negative for color change. Neurological: Negative for weakness. Hematological: Negative for adenopathy. Psychiatric/Behavioral: Negative for agitation and confusion. Physical Exam  Vitals:    05/25/19 2016 05/26/19 0107 05/26/19 0453 05/26/19 0815   BP: 138/60 (!) 145/59 (!) 158/68 (!) 123/46   Pulse: 72 75 81 74   Resp: 16  16 18   Temp: 98.4 °F (36.9 °C)  97.7 °F (36.5 °C) 98.1 °F (36.7 °C)   TempSrc: Oral  Oral Oral   SpO2: 99% 100% 100% 97%   Weight:       Height:         General Appearance: alert and oriented to person, place and time, Yoruba speaking, well-developed and well-nourished, in no acute distress  Skin: warm and dry, no rash. Facial twitches  Head: normocephalic and atraumatic  Eyes: ,extraocular eye movements intact, conjunctivae normal, anicteric sclerae  ENT: oropharynx clear and moist with normal mucous membranes. No thrush  Lungs: normal respiratory effort, Clear Lungs, no rhonchi, no crackles, no wheezes  Heart: RRR, nl S1/S2, no murmur  Abdomen: soft, no tenderness, no H-S-megaly, + BS  NEUROLOGICAL: alert and oriented x 3, no focal deficits  No leg edema  No erythema, no warmth, no tenderness  R 3rd toe with black discoloration, necrotic ulcer, no drainage, mild tenderness  Non palpable peripheral pulses R foot          DATA:    Lab Results   Component Value Date    WBC 7.8 05/26/2019    HGB 11.5 (L) 05/26/2019    HCT 33.3 (L) 05/26/2019    MCV 72.4 (L) 05/26/2019     05/26/2019     Lab Results   Component Value Date    CREATININE 0.75 05/26/2019    BUN 22 05/26/2019     05/26/2019    K 4.4 05/26/2019     05/26/2019    CO2 25 05/26/2019       Hepatic Function Panel:   Lab Results   Component Value Date    ALKPHOS 78 05/25/2019    ALT 11 05/25/2019    AST 18 05/25/2019    PROT 6.4 05/25/2019    BILITOT <0.2 05/25/2019    LABALBU 3.5 05/25/2019         Imaging:   R foot XR:  SOFT TISSUE SWELLING AND MILD OSTEOARTHRITIS IN THE RIGHT 3RD TOE.     IMPRESSION:      R 3rd toe gangrene  DM 2 with PAD    Patient Active Problem List   Diagnosis    Severe major depression with psychotic features ([address]    Type 2 diabetes mellitus with hyperglycemia, with long-term current use of insulin ([address]    HTN (hypertension)    HLD (hyperlipidemia)    Hypothyroid    HF (heart failure) (HCC)    Non-pressure ulcer of toe (HCC)    Atherosclerotic PVD with intermittent claudication (HCC)    Acute osteomyelitis of toe of right foot ([address]    Skin infection       PLAN:  · IV Vanco for now  · pharmacy to dose  · Agree with vascular evaluation and local wound care  · Check Cx    Discussed with patient and RN    Yarelis Duncan MD

## 2019-05-26 NOTE — CARE COORDINATION
Spoke with patient regarding discharge planning. She is from home with her spouse. She said she is independent and uses a walker. She denies any discharge needs. Need consults to see. Will follow.

## 2019-05-26 NOTE — CONSULTS
Inpatient consult to Cardiology  Consult performed by: Yunier Car MD  Consult ordered by: [facility] [place] [facility] Cardiology Consult Note        Date of Consult:   2019    Patient:    Claudean Hall    :    1955  CSN:    [id number]    Consulting Cardiologist: Yunier Car MD     Primary Cardiologist: Sophia Venegas MD, [facility]    Requesting Physician:  Dianelys Matson MD      Reason for Consult:  Right Toe Ulcer PVD      Assessment:      1. Right foot ulcer  2. Claudication symptoms lower extremities  3. Severe PVOD with Abormal PVR suggesting  4. Palpitations, with benign Holter   5. Lightheadedness  6. Bradycardia  7. Syncope, presyncope  8. Hx Chest pain, Probable CAD with Abnormal ECHO and MYOVIEW as Below. 9. Ischemic Cardiomyopathy, LVEF 50% ( Moderate Apical Hypokinesis / MI )  10. History of heart failure  11. Hypothyroidism  12. Hypertension  13. Hyperlipidemia  14. Diabetes  15. Depression  16. Schizophrenia  17. Family history of coronary artery disease  25. Multiple allergies  19. Ukrainian Speaking only. Plan:    1. Cardiac Supportive Care  2. Lovenox for now. 3. CTA ABD and Legs, Cardiology to evaluate Legs at this time. 4. Eventual Cardiac Cath and LL Runoff, possible PCI and PTAs as needed. 5. Cardiac Medications as noted. 6. Further Recommendations to follow  7. See Orders        HISTORY OF PRESENT ILLNESS:      Claudean Hall is a pleasant 66 y.o. female who presented with right tow pain and ulcer. Patient Follows with Sophia Venegas MD.    Patient History and Records, EMR reviewed. Patient Interviewed and examined. Ukrainian Speaking only.  utilized. Patient was recently seen by myself in the office per the note below. She presented with multiple symptoms including claudication and right toe ulcer. She appeared stable at that time and further cardiac testing was obtained.       She's was seen by  [facility] podiatry for her ulcer care. She's had progression of her symptomatology. She presents now with the pain. She has no coolness to her foot. She does have discoloration of her right toe second toe. Recent studies are as noted below including abnormal Myoview perfusion study with moderate anterior myocardial infarction and mild inferior anteroseptal ischemia suggested; echocardiogram noted normal left ventricular function with left ventricular hypertrophy (ejection fraction 50%); 48 hour Holter monitor noted sinus rhythm with only APCs and PVCs rarely. PVR S exercise study lower extremities however noted noncompressible findings but suggestion of severe bilateral lower extremity vascular disease. Denies CP, SOB, LH, Dizziness, TIA or CVA Symptoms. No Orthopnea, Edema or CHF symptoms. No Palpitations. No Syncope. No Fever, Chills or Cold symptoms. No GI,  or Bleeding complaints. Cardiac and general ROS otherwise negative. [address] otherwise negative other than noted. Past Medical History:   Diagnosis Date    Asthma     Colitis     Diabetes mellitus ([facility]     Hyperlipidemia     Hypertension     PVD (peripheral vascular disease) ([facility]     Thyroid disease        Past Surgical History:   Procedure Laterality Date     SECTION      HYSTERECTOMY         Prior to Admission medications    Medication Sig Start Date End Date Taking?  Authorizing Provider   lisinopril (PRINIVIL;ZESTRIL) 10 MG tablet Take 10 mg by mouth daily   Yes Historical Provider, MD   ibuprofen (IBU) 400 MG tablet Take 1 tablet by mouth every 6 hours as needed for Pain 19  Yes Philomena Baron PA-C   sertraline (ZOLOFT) 50 MG tablet Take 1 tablet by mouth daily 3/1/19  Yes ESDRAS Bobby - CNP   insulin lispro (HUMALOG) 100 UNIT/ML injection vial Inject 0-6 Units into the skin 3 times daily (with meals) 19  Yes ESDRAS Sandoval - CNS   insulin lispro (HUMALOG) 100 UNIT/ML injection vial Inject 5 Units into the skin 3 times daily (with meals) 2/28/19  Yes ESDRAS Gould   insulin glargine (LANTUS) 100 UNIT/ML injection vial Inject 35 Units into the skin daily 2/25/19  Yes ESDRAS Gould   metoprolol succinate (TOPROL XL) 50 MG extended release tablet Take 1 tablet by mouth daily 2/25/19  Yes ESDRAS Gould   levothyroxine (SYNTHROID) 50 MCG tablet Take 50 mcg by mouth Daily   Yes Historical Provider, MD   furosemide (LASIX) 40 MG tablet Take 40 mg by mouth 2 times daily   Yes Historical Provider, MD   simvastatin (ZOCOR) 40 MG tablet Take 40 mg by mouth daily   Yes Historical Provider, MD   meclizine (ANTIVERT) 25 MG tablet Take 25 mg by mouth three times daily   Yes Historical Provider, MD   metFORMIN (GLUCOPHAGE) 1000 MG tablet Take 1,000 mg by mouth every 12 hours   Yes Historical Provider, MD   montelukast (SINGULAIR) 10 MG tablet Take 10 mg by mouth daily   Yes Historical Provider, MD   SODIUM CHLORIDE, EXTERNAL, 0.9 % SOLN Apply 1 Applicatorful topically daily 4/23/19   Rhett Estes, DOMO   sodium polystyrene (SPS) 15 GM/60ML suspension Take 60 mLs by mouth 2 times daily for 2 doses 4/7/19 5/14/19  Vika Oliveira PA-C   metoprolol succinate (TOPROL XL) 25 MG extended release tablet Take 1 tablet by mouth daily 2/25/19   ESDRAS Gould       Scheduled Meds:   furosemide  40 mg Oral BID    levothyroxine  50 mcg Oral Daily    lisinopril  10 mg Oral Daily    meclizine  25 mg Oral TID    metoprolol succinate  50 mg Oral Daily    montelukast  10 mg Oral Daily    sertraline  50 mg Oral Daily    atorvastatin  40 mg Oral Nightly    sodium chloride flush  10 mL Intravenous 2 times per day    enoxaparin  40 mg Subcutaneous Daily    vancomycin  1,250 mg Intravenous Q12H    vancomycin (VANCOCIN) intermittent dosing (placeholder)   Other RX Placeholder    mupirocin   Topical Daily    insulin glargine  35 Units Subcutaneous Nightly    insulin lispro  0-12 Units Subcutaneous TID WC    insulin lispro  0-6 Units Subcutaneous Nightly     Continuous Infusions:   sodium chloride 75 mL/hr at 05/26/19 1130    dextrose      dextrose       PRN Meds:ketorolac, sodium chloride flush, magnesium hydroxide, ondansetron, glucose, dextrose, glucagon (rDNA), dextrose, glucose, dextrose, glucagon (rDNA), dextrose    Allergies   Allergen Reactions    Ambien [Zolpidem Tartrate]     Capoten [Captopril]     Clioquinol     Cogentin [Benztropine]     Depakote [Divalproex Sodium]     Effexor Xr [Venlafaxine Hcl Er]     Geodon [Ziprasidone Hcl]     Navane [Thiothixene]     Pamelor [Nortriptyline Hcl]     Remeron [Mirtazapine]     Risperdal [Risperidone]     Trazodone And Nefazodone     Wellbutrin [Bupropion]        Social History     Socioeconomic History    Marital status:      Spouse name: Not on file    Number of children: Not on file    Years of education: Not on file    Highest education level: Not on file   Occupational History    Not on file   Social Needs    Financial resource strain: Not on file    Food insecurity:     Worry: Not on file     Inability: Not on file    Transportation needs:     Medical: Not on file     Non-medical: Not on file   Tobacco Use    Smoking status: Never Smoker    Smokeless tobacco: Never Used   Substance and Sexual Activity    Alcohol use: Never     Frequency: Never    Drug use: Not on file    Sexual activity: Not on file   Lifestyle    Physical activity:     Days per week: Not on file     Minutes per session: Not on file    Stress: Not on file   Relationships    Social connections:     Talks on phone: Not on file     Gets together: Not on file     Attends Latter day service: Not on file     Active member of club or organization: Not on file     Attends meetings of clubs or organizations: Not on file     Relationship status: Not on file    Intimate partner violence:     Fear of current or ex partner: Not on file     Emotionally abused: Not on file     Physically abused: Not on file     Forced sexual activity: Not on file   Other Topics Concern    Not on file   Social History Narrative    Not on file       History reviewed. No pertinent family history. Review Of Systems:    14 point ROS negative other than mentioned. Physical Exam:    CURRENT VITALS: BP (!) 123/46   Pulse 74   Temp 98.1 °F (36.7 °C) (Oral)   Resp 18   Ht 5' 4\" (1.626 m)   Wt 198 lb (89.8 kg)   SpO2 97%   BMI 33.99 kg/m²     CONSTITUTIONAL:  awake, alert, cooperative, no apparent distress,   ENT:  Normocephalic, without obvious abnormality, atraumatic, sinuses nontender on palpation, external ears without lesions,  NECK:  Supple, symmetrical, trachea midline, no adenopathy, thyroid symmetric, not enlarged and no tenderness, skin normal, No bruits. LUNGS:  No increased work of breathing, good air exchange, clear to auscultation bilaterally, no crackles, no wheezing  CARDIOVASCULAR:  Normal apical impulse, regular rate and rhythm, normal S1 and S2,  2/6 Systolic murmur noted. ABDOMEN:  Obese, normal bowel sounds, soft, non-distended, non-tender, no masses palpated, no hepatosplenomegally  EXTREMETIES: mild bilateral edema, Pulses deminished Thruout. Right toe ulcer. Good cappliary refill. NEUROLOGIC:  Awake, alert, oriented to name, place and time. Following all commands and moving all extremties.   SKIN:  no bruising or bleeding, normal skin color, texture, turgor and no rashes    Labs:  Recent Results (from the past 24 hour(s))   CBC    Collection Time: 05/25/19  1:30 PM   Result Value Ref Range    WBC 9.8 4.8 - 10.8 K/uL    RBC 4.82 4.20 - 5.40 M/uL    Hemoglobin 12.0 12.0 - 16.0 g/dL    Hematocrit 34.8 (L) 37.0 - 47.0 %    MCV 72.3 (L) 82.0 - 100.0 fL    MCH 24.9 (L) 27.0 - 31.3 pg    MCHC 34.5 33.0 - 37.0 %    RDW 15.9 (H) 11.5 - 14.5 %    Platelets 594 820 - 120 K/uL   Comprehensive Metabolic Panel Hemoglobin 11.5 (L) 12.0 - 16.0 g/dL    Hematocrit 33.3 (L) 37.0 - 47.0 %    MCV 72.4 (L) 82.0 - 100.0 fL    MCH 25.0 (L) 27.0 - 31.3 pg    MCHC 34.5 33.0 - 37.0 %    RDW 16.2 (H) 11.5 - 14.5 %    Platelets 383 522 - 163 K/uL    Neutrophils % 56.5 %    Lymphocytes % 31.8 %    Monocytes % 8.4 %    Eosinophils % 2.8 %    Basophils % 0.5 %    Neutrophils # 4.4 1.4 - 6.5 K/uL    Lymphocytes # 2.5 1.0 - 4.8 K/uL    Monocytes # 0.7 0.2 - 0.8 K/uL    Eosinophils # 0.2 0.0 - 0.7 K/uL    Basophils # 0.0 0.0 - 0.2 K/uL   Basic Metabolic Panel w/ Reflex to MG    Collection Time: 05/26/19  7:56 AM   Result Value Ref Range    Sodium 141 135 - 144 mEq/L    Potassium reflex Magnesium 4.4 3.4 - 4.9 mEq/L    Chloride 107 95 - 107 mEq/L    CO2 25 20 - 31 mEq/L    Anion Gap 9 9 - 15 mEq/L    Glucose 100 (H) 70 - 99 mg/dL    BUN 22 8 - 23 mg/dL    CREATININE 0.75 0.50 - 0.90 mg/dL    GFR Non-African American >60.0 >60    GFR  >60.0 >60    Calcium 8.6 8.5 - 9.9 mg/dL   Lactic acid, plasma    Collection Time: 05/26/19  7:56 AM   Result Value Ref Range    Lactic Acid 0.8 0.5 - 2.2 mmol/L   POCT Glucose    Collection Time: 05/26/19  8:15 AM   Result Value Ref Range    POC Glucose 96 60 - 115 mg/dl    Performed on ACCU-CHEK    POCT Glucose    Collection Time: 05/26/19 11:38 AM   Result Value Ref Range    POC Glucose 122 (H) 60 - 115 mg/dl    Performed on ACCU-CHEK        ECG:     pending    HOLTER MONITOR:  5/14/19  Sinus Rhtyhm, Avg 70,  Min Max . Rare APCs and PVCs. No PSVT or VT. ECHO:  5/16/19    Moderate Apical LV Hypokinesis, LVEF 50%. Moderate CLVH with Diastolic Heart Disease Noted. MAC with MOderate MR. Moderate TR.      LEXISCAN MYOCARDIAL PERFUSION STUDY  PERFORMED: 05/14/2019  1. Abnormal Lexiscan Myoview cardiac perfusion stress test.  2. Moderate distal anterior myocardial infarction with a mild degree of ijeoma-infarct ischemia. 3. Severe apical myocardial infarction.   4. Mild inferior anteroseptal myocardial ischemia. 5. Normal LV systolic function with calculated left ventricular ejection fraction 54% and apical hypokinesis as described above. 6. Comparison: No previous study is available for comparison. 7. COMMENTS:  8. High risk myocardial perfusion study based on multiple perfusion abnormalities. 9. Clinical correlation is advised. PVR Rest / EX LEGS:  5/16/19  Severe bilateral lower extremity atherosclerotic disease, possibly worse on the left reduced specificity and sensitivity is noted due to noncompressibility of the high thigh and low thigh cuffs. Toe pressures also indicate significant disease, and even some poor wound healing. JIMI; Right . 44, Left .32. Radha Marr MD  [facility] Cardiologist      Electronically signed on 5/26/19 at 12:56 PM      -----    PRIOR RECENT [facility] NOTE:      04/17/2019 - Elicia Nunez was seen in cardiac consultation at the [facility] office 04/17/2019. Patient was interviewed and examined. Electronic medical records reviewed. 62-year-old woman who was recently seen in the emergency room with chest pain and decrease heart rate April 7, 2019. She does not speak [place]. Translation was utilized. She's been recently from [place] for 3 months only. She states that she's had heavy palpitations, have a had chest pressure. Lightheadedness. Syncope presyncope. Does note lower extremity discomfort with ambulation bilaterally. Right foot ulcer. Cardiovascular and general review of systems is otherwise negative. A 14-system review is otherwise negative, other than noted above. Allergies, Medications and Vital Signs are as noted below. Past Medical / Surgical History: Hypertension, hyperlipidemia, diabetes. No pretibial previous TIA or CVA or stroke. Hypothyroidism. No peripheral vascular disease. No carotid artery disease of. Depression. Schizophrenia. Otherwise as below.     Social History: No tobacco or alcohol use Otherwise as below. Family History: Positive family history for CAD. Otherwise as below. ELECTROCARDIOGRAM: Sinus rhythm no acute changes rate 67. LABORATORY DATA: Chem-7, CBC, liver function studies normal. As noted below. All above testing was personally reviewed. PHYSICAL EXAMINATION:   General: No acute distress. Vital signs as noted. Alert and oriented. Head And Neck Examination: No jugular venous distention, no carotid  bruits, no mass. Carotid upstrokes preserved. Oral mucosa moist.   No xanthelasma. Head and neck examination otherwise unremarkable. Lungs: Clear to auscultation and percussion. No wheezes, no rales,   and no rhonchi. Chest: Excursion appeared to be normal. No chest wall tenderness on  palpation. Heart: Normal S1 and S2. No S3. No S4. No rub. Grade 2/6  systolic murmur, best heard at the left sternal border. Point of  maximal impulse was within normal limits. Abdomen: Soft. Nontender. No organomegaly. No bruits. No masses. Obese. Extremities: No bipedal edema. No clubbing. No cyanosis. Pulses are strong throughout. No bruits. Musculoskeletal Exam: No ulcers, otherwise unremarkable. Neuro: Neurologically appeared grossly intact. IMPRESSION:     Chest pain  Claudication symptoms lower extremities  Palpitations  Lightheadedness  Bradycardia  Syncope, presyncope  Right foot ulcer  History of heart failure  Hypothyroidism  Hypertension  Hyperlipidemia  Diabetes  Depression  Schizophrenia  Family history of coronary artery disease  Multiple allergies as noted  Otherwise as per assessment below. RECOMMENDATIONS:     Given the patient's above findings would suggest the following: Echocardiogram, Holter monitor (48 hours), PVR rest exercise study lower extremities, Lexiscan Myoview perfusion study. We will plan to see back i following the above with Laboratory Studies and ECG as noted below.      Patient will follow up with their primary physician for general care. The patient knows to contact medical care earlier if need be. Maurilio Buchanan MD, [address] / [facility] Cardiology      Of Note:  At [employer] voice recognition dictation software was utilized partially in the preparation of this note, therefore, inaccuracies in spelling, word choice and punctuation may have occurred which were not recognized the time of signing. Chief Complaint  Cardiology Reason for Visit_NOH: Consultation referred by Dr. Kitty Dominguez for chest pain and fast-slow pulse. Family History   1. Family history of diabetes mellitus (V18.0) (Z83.3) : Mother, Sister   2. Family history of Hodgkin's lymphoma (V16.7) (Z80.7) : Father   3. Family history of hypertension (V17.49) (Z82.49) : Mother, Father, Sister, Brother   4. Family history of myocardial infarction (V17.3) (Z82.49) : Father   5.  Family history of Lupus : Sister    Social History   · Daily caffeine consumption, 2-3 servings a day   ·

## 2019-05-27 LAB
ALBUMIN SERPL-MCNC: 3 G/DL (ref 3.5–4.6)
ALP BLD-CCNC: 66 U/L (ref 40–130)
ALT SERPL-CCNC: 10 U/L (ref 0–33)
ANION GAP SERPL CALCULATED.3IONS-SCNC: 10 MEQ/L (ref 9–15)
AST SERPL-CCNC: 13 U/L (ref 0–35)
BILIRUB SERPL-MCNC: 0.3 MG/DL (ref 0.2–0.7)
BUN BLDV-MCNC: 14 MG/DL (ref 8–23)
C-REACTIVE PROTEIN, HIGH SENSITIVITY: 14.7 MG/L (ref 0–5)
CALCIUM SERPL-MCNC: 8.9 MG/DL (ref 8.5–9.9)
CHLORIDE BLD-SCNC: 105 MEQ/L (ref 95–107)
CO2: 24 MEQ/L (ref 20–31)
CREAT SERPL-MCNC: 0.6 MG/DL (ref 0.5–0.9)
GFR AFRICAN AMERICAN: >60
GFR NON-AFRICAN AMERICAN: >60
GLOBULIN: 2.9 G/DL (ref 2.3–3.5)
GLUCOSE BLD-MCNC: 123 MG/DL (ref 60–115)
GLUCOSE BLD-MCNC: 149 MG/DL (ref 60–115)
GLUCOSE BLD-MCNC: 292 MG/DL (ref 60–115)
GLUCOSE BLD-MCNC: 64 MG/DL (ref 70–99)
GLUCOSE BLD-MCNC: 72 MG/DL (ref 60–115)
HCT VFR BLD CALC: 31.8 % (ref 37–47)
HEMOGLOBIN: 11.1 G/DL (ref 12–16)
LACTIC ACID: 0.6 MMOL/L (ref 0.5–2.2)
MAGNESIUM: 2.1 MG/DL (ref 1.7–2.4)
MCH RBC QN AUTO: 25.2 PG (ref 27–31.3)
MCHC RBC AUTO-ENTMCNC: 35 % (ref 33–37)
MCV RBC AUTO: 72 FL (ref 82–100)
PDW BLD-RTO: 16 % (ref 11.5–14.5)
PERFORMED ON: ABNORMAL
PERFORMED ON: NORMAL
PLATELET # BLD: 203 K/UL (ref 130–400)
POTASSIUM REFLEX MAGNESIUM: 4.5 MEQ/L (ref 3.4–4.9)
POTASSIUM SERPL-SCNC: 4.5 MEQ/L (ref 3.4–4.9)
RBC # BLD: 4.42 M/UL (ref 4.2–5.4)
REASON FOR REJECTION: NORMAL
REJECTED TEST: NORMAL
SEDIMENTATION RATE, ERYTHROCYTE: 44 MM (ref 0–30)
SODIUM BLD-SCNC: 139 MEQ/L (ref 135–144)
TOTAL PROTEIN: 5.9 G/DL (ref 6.3–8)
TROPONIN: 0.04 NG/ML (ref 0–0.01)
WBC # BLD: 8.3 K/UL (ref 4.8–10.8)

## 2019-05-27 PROCEDURE — 6370000000 HC RX 637 (ALT 250 FOR IP): Performed by: INTERNAL MEDICINE

## 2019-05-27 PROCEDURE — 83735 ASSAY OF MAGNESIUM: CPT

## 2019-05-27 PROCEDURE — 85027 COMPLETE CBC AUTOMATED: CPT

## 2019-05-27 PROCEDURE — 86141 C-REACTIVE PROTEIN HS: CPT

## 2019-05-27 PROCEDURE — 84484 ASSAY OF TROPONIN QUANT: CPT

## 2019-05-27 PROCEDURE — 83605 ASSAY OF LACTIC ACID: CPT

## 2019-05-27 PROCEDURE — 97165 OT EVAL LOW COMPLEX 30 MIN: CPT

## 2019-05-27 PROCEDURE — 2580000003 HC RX 258: Performed by: INTERNAL MEDICINE

## 2019-05-27 PROCEDURE — 1210000000 HC MED SURG R&B

## 2019-05-27 PROCEDURE — 80053 COMPREHEN METABOLIC PANEL: CPT

## 2019-05-27 PROCEDURE — 6360000002 HC RX W HCPCS: Performed by: INTERNAL MEDICINE

## 2019-05-27 PROCEDURE — 2580000003 HC RX 258: Performed by: PHYSICIAN ASSISTANT

## 2019-05-27 PROCEDURE — 6360000002 HC RX W HCPCS: Performed by: PHYSICIAN ASSISTANT

## 2019-05-27 PROCEDURE — 99232 SBSQ HOSP IP/OBS MODERATE 35: CPT | Performed by: INTERNAL MEDICINE

## 2019-05-27 PROCEDURE — 36415 COLL VENOUS BLD VENIPUNCTURE: CPT

## 2019-05-27 PROCEDURE — 85652 RBC SED RATE AUTOMATED: CPT

## 2019-05-27 RX ORDER — SODIUM CHLORIDE 9 MG/ML
INJECTION, SOLUTION INTRAVENOUS CONTINUOUS
Status: DISCONTINUED | OUTPATIENT
Start: 2019-05-27 | End: 2019-05-28

## 2019-05-27 RX ORDER — SODIUM CHLORIDE 0.9 % (FLUSH) 0.9 %
10 SYRINGE (ML) INJECTION EVERY 12 HOURS SCHEDULED
Status: DISCONTINUED | OUTPATIENT
Start: 2019-05-27 | End: 2019-05-28 | Stop reason: SDUPTHER

## 2019-05-27 RX ORDER — SODIUM CHLORIDE 0.9 % (FLUSH) 0.9 %
10 SYRINGE (ML) INJECTION PRN
Status: DISCONTINUED | OUTPATIENT
Start: 2019-05-27 | End: 2019-05-28 | Stop reason: SDUPTHER

## 2019-05-27 RX ADMIN — PIPERACILLIN AND TAZOBACTAM 3.38 G: 3; .375 INJECTION, POWDER, FOR SOLUTION INTRAVENOUS at 14:20

## 2019-05-27 RX ADMIN — NITROGLYCERIN 1 INCH: 20 OINTMENT TOPICAL at 20:19

## 2019-05-27 RX ADMIN — NITROGLYCERIN 1 INCH: 20 OINTMENT TOPICAL at 14:31

## 2019-05-27 RX ADMIN — FUROSEMIDE 40 MG: 40 TABLET ORAL at 09:24

## 2019-05-27 RX ADMIN — KETOROLAC TROMETHAMINE 15 MG: 15 INJECTION, SOLUTION INTRAMUSCULAR; INTRAVENOUS at 09:24

## 2019-05-27 RX ADMIN — SODIUM CHLORIDE: 9 INJECTION, SOLUTION INTRAVENOUS at 06:08

## 2019-05-27 RX ADMIN — VANCOMYCIN HYDROCHLORIDE 1250 MG: 5 INJECTION, POWDER, LYOPHILIZED, FOR SOLUTION INTRAVENOUS at 11:07

## 2019-05-27 RX ADMIN — LISINOPRIL 10 MG: 10 TABLET ORAL at 09:21

## 2019-05-27 RX ADMIN — MONTELUKAST 10 MG: 10 TABLET, FILM COATED ORAL at 09:21

## 2019-05-27 RX ADMIN — LEVOTHYROXINE SODIUM 50 MCG: 50 TABLET ORAL at 05:57

## 2019-05-27 RX ADMIN — METOPROLOL SUCCINATE 50 MG: 50 TABLET, EXTENDED RELEASE ORAL at 09:21

## 2019-05-27 RX ADMIN — MECLIZINE HYDROCHLORIDE 25 MG: 25 TABLET ORAL at 20:19

## 2019-05-27 RX ADMIN — MECLIZINE HYDROCHLORIDE 25 MG: 25 TABLET ORAL at 11:06

## 2019-05-27 RX ADMIN — ENOXAPARIN SODIUM 40 MG: 40 INJECTION SUBCUTANEOUS at 20:18

## 2019-05-27 RX ADMIN — PIPERACILLIN AND TAZOBACTAM 3.38 G: 3; .375 INJECTION, POWDER, FOR SOLUTION INTRAVENOUS at 20:19

## 2019-05-27 RX ADMIN — SERTRALINE HYDROCHLORIDE 50 MG: 50 TABLET ORAL at 09:21

## 2019-05-27 RX ADMIN — INSULIN GLARGINE 35 UNITS: 100 INJECTION, SOLUTION SUBCUTANEOUS at 22:25

## 2019-05-27 RX ADMIN — FUROSEMIDE 40 MG: 40 TABLET ORAL at 18:07

## 2019-05-27 RX ADMIN — NITROGLYCERIN 1 INCH: 20 OINTMENT TOPICAL at 05:57

## 2019-05-27 RX ADMIN — ATORVASTATIN CALCIUM 40 MG: 40 TABLET, FILM COATED ORAL at 20:19

## 2019-05-27 ASSESSMENT — PAIN DESCRIPTION - LOCATION: LOCATION: FOOT

## 2019-05-27 ASSESSMENT — PAIN SCALES - GENERAL
PAINLEVEL_OUTOF10: 2
PAINLEVEL_OUTOF10: 7
PAINLEVEL_OUTOF10: 0
PAINLEVEL_OUTOF10: 8

## 2019-05-27 ASSESSMENT — PAIN DESCRIPTION - FREQUENCY: FREQUENCY: CONTINUOUS

## 2019-05-27 ASSESSMENT — PAIN DESCRIPTION - PAIN TYPE: TYPE: ACUTE PAIN

## 2019-05-27 NOTE — PROGRESS NOTES
Pertinent history: 66y.o. year old female with significant PMH of DM2, HTN, HLD, HF, PVD seen for chronic ulcer right 3rd toe. Has been applying the Bactroban daily to the wound. She saw a cardiologist for her circulation. Her ulcer has started draining a clear fluid since last seeing Dr. Jamari Peres, but no redness or swelling of the foot. No current facility-administered medications on file prior to encounter.       Current Outpatient Medications on File Prior to Encounter   Medication Sig Dispense Refill    lisinopril (PRINIVIL;ZESTRIL) 10 MG tablet Take 10 mg by mouth daily      ibuprofen (IBU) 400 MG tablet Take 1 tablet by mouth every 6 hours as needed for Pain 20 tablet 0    sertraline (ZOLOFT) 50 MG tablet Take 1 tablet by mouth daily 30 tablet 0    insulin lispro (HUMALOG) 100 UNIT/ML injection vial Inject 0-6 Units into the skin 3 times daily (with meals) 1 vial 3    insulin lispro (HUMALOG) 100 UNIT/ML injection vial Inject 5 Units into the skin 3 times daily (with meals) 1 vial 3    insulin glargine (LANTUS) 100 UNIT/ML injection vial Inject 35 Units into the skin daily 1 vial 3    metoprolol succinate (TOPROL XL) 50 MG extended release tablet Take 1 tablet by mouth daily 30 tablet 3    levothyroxine (SYNTHROID) 50 MCG tablet Take 50 mcg by mouth Daily      furosemide (LASIX) 40 MG tablet Take 40 mg by mouth 2 times daily      simvastatin (ZOCOR) 40 MG tablet Take 40 mg by mouth daily      meclizine (ANTIVERT) 25 MG tablet Take 25 mg by mouth three times daily      metFORMIN (GLUCOPHAGE) 1000 MG tablet Take 1,000 mg by mouth every 12 hours      montelukast (SINGULAIR) 10 MG tablet Take 10 mg by mouth daily      SODIUM CHLORIDE, EXTERNAL, 0.9 % SOLN Apply 1 Applicatorful topically daily 1000 mL 2    sodium polystyrene (SPS) 15 GM/60ML suspension Take 60 mLs by mouth 2 times daily for 2 doses 120 mL 0    metoprolol succinate (TOPROL XL) 25 MG extended release tablet Take 1 tablet by mouth daily 30 tablet 3       REVIEW OF SYSTEMS  See interval history      OBJECTIVE:  BP (!) 139/55   Pulse 77   Temp 98.1 °F (36.7 °C) (Oral)   Resp 18   Ht 5' 4\" (1.626 m)   Wt 199 lb 4.7 oz (90.4 kg)   SpO2 97%   BMI 34.21 kg/m²   Patient is alert and oriented x 3 in NAD. Vascular:   Non Palpable Dorsalis Pedis and non Palpable Posterior Tibial Pulses B/L   Capillary Fill time < 5 seconds to B/L digits  Skin temperature warm to cool tibial tuberosity to the digits B/L  Hair growth present to digits  Mild edema  No varicosities   Dysvascular changes to the right 3rd toe    Neurological:   Epicritic sensation intact B/L  Protective sensation via monofilament testing intact B/L    Musculoskeletal/Orthopaedic:   5/5 muscle strength Dorsiflexion, Plantarflexion, Inversion, Eversion B/L  + pain on palpation to 3rd toe of the right foot. Dermatological:   Ulceration #1:   Location: Lateral aspect of 3rd toe right foot  Measurements: 1.2 cm x 0.8 cm x 0.3 cm  Base: fibro-necrotic tissue  Borders: + extensive hyperkeratosis and macerated tissue. Exudate: no drainage   Comments: No periwound erythema and edema beyond wound borders with no evidence of ascending lymphangitis.  Probes to PIPJ of the toe      LABS:   Lab Results   Component Value Date    WBC 8.3 05/27/2019    HGB 11.1 (L) 05/27/2019    HCT 31.8 (L) 05/27/2019    MCV 72.0 (L) 05/27/2019     05/27/2019     Lab Results   Component Value Date     05/27/2019    K 4.5 05/27/2019    K 4.5 05/27/2019     05/27/2019    CO2 24 05/27/2019    BUN 14 05/27/2019    CREATININE 0.60 05/27/2019    GLUCOSE 64 05/27/2019    GLUCOSE 131 05/22/2019    CALCIUM 8.9 05/27/2019      Lab Results   Component Value Date    LABALBU 3.0 (L) 05/27/2019     Lab Results   Component Value Date    SEDRATE 44 (H) 05/27/2019     Lab Results   Component Value Date    CRP 11.7 (H) 05/25/2019     Lab Results   Component Value Date    LABA1C 7.0 (H) 05/25/2019

## 2019-05-27 NOTE — PROGRESS NOTES
Hospitalist Progress Note      PCP: Mike Hector    Date of Admission: 5/25/2019    Chief Complaint:    Chief Complaint   Patient presents with    Toe Pain     right 4th toe pain/numbness x 3 months. States toe is black     Subjective:  Patient denies fevers, chills, sweats, CP, SOB. 12 point ROS negative other than mentioned above     Medications:  Reviewed    Infusion Medications    sodium chloride 75 mL/hr at 05/27/19 0608    dextrose      dextrose       Scheduled Medications    piperacillin-tazobactam  3.375 g Intravenous Q8H    furosemide  40 mg Oral BID    levothyroxine  50 mcg Oral Daily    lisinopril  10 mg Oral Daily    meclizine  25 mg Oral TID    metoprolol succinate  50 mg Oral Daily    montelukast  10 mg Oral Daily    sertraline  50 mg Oral Daily    atorvastatin  40 mg Oral Nightly    nitroglycerin  1 inch Topical 3 times per day    sodium chloride flush  10 mL Intravenous 2 times per day    enoxaparin  40 mg Subcutaneous Daily    mupirocin   Topical Daily    insulin glargine  35 Units Subcutaneous Nightly    insulin lispro  0-12 Units Subcutaneous TID WC    insulin lispro  0-6 Units Subcutaneous Nightly     PRN Meds: ketorolac, sodium chloride flush, magnesium hydroxide, ondansetron, glucose, dextrose, glucagon (rDNA), dextrose, glucose, dextrose, glucagon (rDNA), dextrose      Intake/Output Summary (Last 24 hours) at 5/27/2019 1239  Last data filed at 5/27/2019 4339  Gross per 24 hour   Intake 3275 ml   Output 1000 ml   Net 2275 ml     Exam:    BP (!) 139/55   Pulse 77   Temp 98.1 °F (36.7 °C) (Oral)   Resp 18   Ht 5' 4\" (1.626 m)   Wt 199 lb 4.7 oz (90.4 kg)   SpO2 97%   BMI 34.21 kg/m²     General appearance: No apparent distress, appears stated age and cooperative. HEENT:  Conjunctivae/corneas clear. Neck:  Trachea midline. Respiratory:  Normal respiratory effort.  Clear to auscultation  Cardiovascular: Regular rate and rhythm   Abdomen: Soft, non-tender, non-distended with normal bowel sounds. Musculoskeletal: No clubbing, cyanosis or edema bilaterally. 3rd right toe blueish  Neuro: Non Focal.    Labs:   Recent Labs     05/25/19  1330 05/26/19  0756 05/27/19  0720   WBC 9.8 7.8 8.3   HGB 12.0 11.5* 11.1*   HCT 34.8* 33.3* 31.8*    203 203     Recent Labs     05/25/19  1330 05/26/19  0756 05/27/19  0720    141 139   K 4.7 4.4 4.5  4.5    107 105   CO2 24 25 24   BUN 31* 22 14   CREATININE 0.84 0.75 0.60   CALCIUM 9.0 8.6 8.9     Recent Labs     05/25/19  1330 05/27/19  0720   AST 18 13   ALT 11 10   BILITOT <0.2 0.3   ALKPHOS 78 66     Recent Labs     05/25/19  1348   INR 1.0     Recent Labs     05/27/19  0720   TROPONINI 0.039*     Urinalysis:      Lab Results   Component Value Date    NITRU Negative 02/21/2019    BLOODU Negative 02/21/2019    SPECGRAV 1.018 02/21/2019    GLUCOSEU 250 02/21/2019     Radiology:  US CAROTID ARTERY BILATERAL   Final Result   PLAQUE IN BOTH DISTAL COMMON CAROTID ARTERIES AND AT THE CAROTID BIFURCATIONS. ESTIMATED RIGHT INTERNAL CAROTID ARTERY STENOSIS IS 50-69% AND ESTIMATED LEFT INTERNAL CAROTID ARTERY STENOSIS IS 50-69%. Validated velocity measurements with angiographic measurements and velocity criteria are extrapolated from diameter data as defined by the Society of Radiologists in [address] [place] [address] Drive. Radiology 2003; 677;152-905. MRI FOOT RIGHT W WO CONTRAST   Final Result     Edema in the 3rd proximal phalanx concerning for osteomyelitis. XR TOE RIGHT (MIN 2 VIEWS)   Final Result   SOFT TISSUE SWELLING AND MILD OSTEOARTHRITIS IN THE RIGHT 3RD TOE.       CTA ABDOMINAL AORTA W BILAT RUNOFF W WO CONTRAST    (Results Pending)     Assessment/Plan:    #PAD with Osteomyelitis of toe     - Broadening to add zosyn to vanc; plan for revasc and possible amputation; will likely need long term IV Abx    #HTN/HLD/Hypothyroidism     - Continue home meds    #DMII     - [facility] Problems    Diagnosis Date Noted    Skin infection [L08.9] 05/25/2019     Additional work up or/and treatment plan may be added today or then after based on clinical progression. I am managing a portion of pt care. Some medical issues are handled by other specialists. Additional work up and treatment should be done in out pt setting by pt PCP and other out pt providers. In addition to examining and evaluating pt, I spent additional time explaining care, normal and abnormal findings, and treatment plan. All of pt questions were answered. Counseling, diet and education were  provided. Case will be discussed with nursing staff when appropriate. Family will be updated if and when appropriate.       Diet: DIET CARB CONTROL;    Code Status: Full Code    PT/OT Eval     Electronically signed by Gee Noel MD on 5/27/2019 at 12:39 PM

## 2019-05-27 NOTE — PLAN OF CARE
Nutrition Problem: No nutrition diagnosis at this time  Intervention: Food and/or Nutrient Delivery: Modify current diet(Car Control 4)  Nutritional Goals: po > 75%.  gluc < 140

## 2019-05-27 NOTE — PROGRESS NOTES
Assumed care of patient. Agree with previous assessment. Pt received only half of 2200 Vanco dose by previous nurse, pharmacy was notified. Will continue to monitor.  Electronically signed by Carli Sandoval RN on 5/27/19 at 12:00 AM

## 2019-05-27 NOTE — PROGRESS NOTES
Rounded with Dr. Lyudmila Camacho, wound redressed. She wait for cardiology's input, and possible amputation of toe later this week. Patient states pain is at a tolerable level after medicated per orders.

## 2019-05-27 NOTE — PROGRESS NOTES
Nutrition Assessment    Type and Reason for Visit: Initial, Positive Nutrition Screen(wound, necrotic toe)    Nutrition Recommendations: Carb Control 4 diet    Nutrition Assessment: Adequate nutritional status upon admission. ONS not indicated at this time    Malnutrition Assessment:  · Malnutrition Status: No malnutrition  · Context: Acute illness or injury  · Findings of the 6 clinical characteristics of malnutrition (Minimum of 2 out of 6 clinical characteristics is required to make the diagnosis of moderate or severe Protein Calorie Malnutrition based on AND/ASPEN Guidelines):  1. Energy Intake-Greater than 75% of estimated energy requirement, Greater than or equal to 5 days    2. Weight Loss-No significant weight loss,    3. Fat Loss-No significant subcutaneous fat loss,    4. Muscle Loss-No significant muscle mass loss,    5. Fluid Accumulation-No significant fluid accumulation,    6.  Strength-Not measured    Nutrition Risk Level: Moderate    Nutrient Needs:  · Estimated Daily Total Kcal: 4882-5683 kcals, ( 15-18 kcal/kg)   · Estimated Daily Protein (g): 65-75 g protein ( 1.2- 1.4 g/kg IBW)  · Estimated Daily Total Fluid (ml/day): 1620 ml ( 30 ml/kg iBW)    Nutrition Diagnosis:   · Problem: No nutrition diagnosis at this time    Objective Information:  · Nutrition-Focused Physical Findings: no edema, Hx DM, colitis, PVD.  GLuc =   · Wound Type:  necrotic toes, needs amputation  · Current Nutrition Therapies:  · Oral Diet Orders: (Carb COntrol)   · Oral Diet intake: %  · Oral Nutrition Supplement (ONS) Orders: None  · Anthropometric Measures:  · Ht: 5' 4\" (162.6 cm)   · Current Body Wt: 199 lb (90.3 kg)  · Admission Body Wt: 199 lb (90.3 kg)  · Usual Body Wt: 209 lb (94.8 kg)(2/19)  · % Weight Change:  ,  10# ( 4.8%) x 3 monthsm not a significant chagne  · Ideal Body Wt: 120 lb (54.4 kg), % Ideal Body > 100%  · BMI Classification: BMI 30.0 - 34.9 Obese Class I    Nutrition Interventions: Modify current diet(Car Control 4)  Continued Inpatient Monitoring, Education Not Indicated    Nutrition Evaluation:   · Evaluation: Goals set   · Goals: po > 75%.  gluc < 140    · Monitoring: Meal Intake, Weight, Pertinent Labs, Skin Integrity      Electronically signed by Davon Ba RD, JACQUE on 5/27/19 at 2:07 PM

## 2019-05-27 NOTE — PROGRESS NOTES
Occupatio  [facility] OCCUPATIONAL THERAPY EVALUATION - ACUTE     Date: 2019  Patient Name: Ralf Manriquez        MRN: [id number]  Account: [de-identified]   : 1955  (66 y.o.)  [facility]    Chart Review:  Diagnosis:  The encounter diagnosis was Skin ulcer of third toe, right, with unspecified severity ([facility]   Past Medical History:   Diagnosis Date    Asthma     Colitis     Diabetes mellitus ([facility]     Hyperlipidemia     Hypertension     PVD (peripheral vascular disease) ([facility]     Thyroid disease      Past Surgical History:   Procedure Laterality Date     SECTION      HYSTERECTOMY         Restrictions        Safety Devices: Safety Devices  Safety Devices in place: Not Applicable    Subjective       Pain Reassessment:   Pain Assessment  Patient Currently in Pain: Yes  Pain Assessment: 0-10  Pain Level: 8  Pain Type: Acute pain  Pain Location: Foot  Pain Frequency: Continuous       Orientation  Orientation  Overall Orientation Status: Within Functional Limits    Prior Level of Function:  Social/Functional History  Lives With: Spouse  Type of Home: Apartment  Home Layout: One level(5th floor apt with elevator )  Home Access: Level entry  ADL Assistance: Independent  Homemaking Assistance: Independent  Homemaking Responsibilities: Yes  Ambulation Assistance: Independent  Transfer Assistance: Independent    OBJECTIVE:     Orientation Status:  Orientation  Overall Orientation Status: Within Functional Limits    Observation:  Observation/Palpation  Observation: dressing intact Lt toes     Cognition Status:  Cognition  Overall Cognitive Status: WFL    Perception Status:  Perception  Overall Perceptual Status: WFL    Sensation Status:  Sensation  Overall Sensation Status: Impaired(foot)    Vision and Hearing Status:  Vision  Vision: Impaired  Vision Exceptions: Wears glasses at all times  Hearing  Hearing: Within functional limits     ROM:   LUE AROM (degrees)  LUE AROM : WFL  Left Hand AROM (degrees)  Left Hand AROM: WFL  RUE AROM (degrees)  RUE AROM : WFL  Right Hand AROM (degrees)  Right Hand AROM: WFL    Strength:  LUE Strength  Gross LUE Strength: WFL  RUE Strength  Gross RUE Strength: WFL    Coordination, Tone, Quality of Movement: Tone RUE  RUE Tone: Normotonic  Tone LUE  LUE Tone: Normotonic  Coordination  Movements Are Fluid And Coordinated: Yes    Hand Dominance:  Hand Dominance  Hand Dominance: Right    ADL Status:  ADL  Feeding: Independent  Grooming: Independent  LE Bathing: Independent  UE Dressing: Independent  LE Dressing: Independent  Toileting: Independent  Additional Comments: slipper socks and sweater, simulated bathing  Toilet Transfers  Toilet - Technique: Ambulating  Equipment Used: Grab bars(quadcane)  Toilet Transfer: Modified independent       Functional Mobility:  Functional Mobility  Functional - Mobility Device: Cane(quadcane)  Transfers  Sit to stand: Independent  Stand to sit:  Independent    Bed Mobility  Bed mobility  Rolling to Right: Independent  Supine to Sit: Independent  Sit to Supine: Independent    Seated and Standing Balance:  Balance  Sitting Balance: Independent  Standing Balance: Independent    Functional Endurance:  Activity Tolerance  Activity Tolerance: Patient Tolerated treatment well    D/C Recommendations:    Equipment Recommendations:      OT Follow Up:  OT D/C RECOMMENDATIONS  REQUIRES OT FOLLOW UP: No       Assessment/Discharge Disposition:     Performance deficits / Impairments: Decreased endurance  Prognosis: Good  No Skilled OT: Independent with functional mobility, Independent with ADL's       Six Click Score   How much help for putting on and taking off regular lower body clothing?: None  How much help for Bathing?: None  How much help for Toileting?: None  How much help for putting on and taking off regular upper body clothing?: None  How much help for taking care of personal grooming?: None  How much help for eating meals?: None  AM-PAC Inpatient Daily Activity Raw Score: 24  AM-PAC Inpatient ADL T-Scale Score : 57.54  ADL Inpatient CMS 0-100% Score: 0    Plan:  Plan  Times per week: No OT Pt ind    Goals:       Patient Goal:    D/C home when able   Discussed and agreed upon: Yes Comments:     Therapy Time:   OT Individual Minutes  Time In: 1145  Time Out: 1200  Minutes: 15    Electronically signed by:    Raj Boxer Drotos, OTR/L  5/27/2019, 12:03 PM

## 2019-05-27 NOTE — PROGRESS NOTES
Infectious Diseases Inpatient Progress Note          HISTORY OF PRESENT ILLNESS:  Follow up toe gangrene on IV Vanco and Zosyn, well tolerated. Patient is scheduled for angiogram and later on toe amputation. Current Medications:     piperacillin-tazobactam  3.375 g Intravenous Q8H    furosemide  40 mg Oral BID    levothyroxine  50 mcg Oral Daily    lisinopril  10 mg Oral Daily    meclizine  25 mg Oral TID    metoprolol succinate  50 mg Oral Daily    montelukast  10 mg Oral Daily    sertraline  50 mg Oral Daily    atorvastatin  40 mg Oral Nightly    nitroglycerin  1 inch Topical 3 times per day    sodium chloride flush  10 mL Intravenous 2 times per day    enoxaparin  40 mg Subcutaneous Daily    mupirocin   Topical Daily    insulin glargine  35 Units Subcutaneous Nightly    insulin lispro  0-12 Units Subcutaneous TID WC    insulin lispro  0-6 Units Subcutaneous Nightly       Allergies:  Ambien [zolpidem tartrate]; Capoten [captopril]; Clioquinol; Cogentin [benztropine]; Depakote [divalproex sodium]; Effexor xr [venlafaxine hcl er]; Geodon [ziprasidone hcl]; Navane [thiothixene]; Pamelor [nortriptyline hcl]; Remeron [mirtazapine]; Risperdal [risperidone]; Trazodone and nefazodone; and Wellbutrin [bupropion]      Review of Systems  14 system review is negative other than HPI    Physical Exam  Vitals:    05/26/19 0453 05/26/19 0815 05/27/19 0600 05/27/19 0845   BP: (!) 158/68 (!) 123/46  (!) 139/55   Pulse: 81 74  77   Resp: 16 18  18   Temp: 97.7 °F (36.5 °C) 98.1 °F (36.7 °C)     TempSrc: Oral Oral     SpO2: 100% 97%     Weight:   199 lb 4.7 oz (90.4 kg)    Height:         General Appearance: alert and oriented to person, place and time, well-developed and well-nourished, in no acute distress  Skin: warm and dry, no rash. Head: normocephalic and atraumatic  Eyes: anicteric sclerae  ENT: oropharynx clear and moist with normal mucous membranes.  No oral thrush  Lungs: normal respiratory effort  Abdomen: soft, no tenderness  No leg edema  No erythema, no tenderness  R 3rd toe with black discoloration and necrotic ulcer over lateral aspect, no drainage, no malodor  Non palpable peripheral pulses R foot    DATA:    Lab Results   Component Value Date    WBC 8.3 05/27/2019    HGB 11.1 (L) 05/27/2019    HCT 31.8 (L) 05/27/2019    MCV 72.0 (L) 05/27/2019     05/27/2019     Lab Results   Component Value Date    CREATININE 0.60 05/27/2019    BUN 14 05/27/2019     05/27/2019    K 4.5 05/27/2019    K 4.5 05/27/2019     05/27/2019    CO2 24 05/27/2019       Hepatic Function Panel:  Lab Results   Component Value Date    ALKPHOS 66 05/27/2019    ALT 10 05/27/2019    AST 13 05/27/2019    PROT 5.9 05/27/2019    BILITOT 0.3 05/27/2019    LABALBU 3.0 05/27/2019       Microbiology:   Recent Labs     05/25/19  1529   BC No Growth to date. Any change in status will be called. Recent Labs     05/25/19  1529   BLOODCULT2 No Growth to date. Any change in status will be called.      Imaging:     Edema in the 3rd proximal phalanx concerning for osteomyelitis   Susceptibility   Wound Cx on 04/23  Acinetobacter baumannii (1)     Antibiotic Interpretation ROBEL Status   ampicillin-sulbactam Sensitive <=2 mcg/mL    cefepime Intermediate 16 mcg/mL    cefTRIAXone Intermediate 16 mcg/mL    ciprofloxacin Sensitive <=0.25 mcg/mL    gentamicin Sensitive 2 mcg/mL    imipenem Sensitive <=0.25 mcg/mL    tobramycin Sensitive <=         IMPRESSION:    R 3rd toe gangrene/ acute osteomyelitis  Acinetobacter infection  DM 2 with PAD    Patient Active Problem List   Diagnosis    Severe major depression with psychotic features ([address]    Type 2 diabetes mellitus with hyperglycemia, with long-term current use of insulin ([address]    HTN (hypertension)    HLD (hyperlipidemia)    Hypothyroid    HF (heart failure) (HCC)    Non-pressure ulcer of toe (HCC)    Atherosclerotic PVD with intermittent claudication ([address]    Acute

## 2019-05-27 NOTE — PROGRESS NOTES
Daily    sertraline  50 mg Oral Daily    atorvastatin  40 mg Oral Nightly    nitroglycerin  1 inch Topical 3 times per day    vancomycin (VANCOCIN) intermittent dosing (placeholder)   Other RX Placeholder    sodium chloride flush  10 mL Intravenous 2 times per day    enoxaparin  40 mg Subcutaneous Daily    vancomycin  1,250 mg Intravenous Q12H    mupirocin   Topical Daily    insulin glargine  35 Units Subcutaneous Nightly    insulin lispro  0-12 Units Subcutaneous TID WC    insulin lispro  0-6 Units Subcutaneous Nightly     Continuous Infusions:   sodium chloride 75 mL/hr at 05/27/19 0608    dextrose      dextrose       PRN Meds:ketorolac, sodium chloride flush, magnesium hydroxide, ondansetron, glucose, dextrose, glucagon (rDNA), dextrose, glucose, dextrose, glucagon (rDNA), dextrose    PHYSICAL EXAM:    CURRENT VITALS: BP (!) 139/55   Pulse 77   Temp 98.1 °F (36.7 °C) (Oral)   Resp 18   Ht 5' 4\" (1.626 m)   Wt 199 lb 4.7 oz (90.4 kg)   SpO2 97%   BMI 34.21 kg/m²     CONSTITUTIONAL:  awake, alert, cooperative, no apparent distress,   ENT:  Normocephalic, without obvious abnormality, atraumatic, sinuses nontender on palpation, external ears without lesions,  NECK:  Supple, symmetrical, trachea midline, no adenopathy, thyroid symmetric, not enlarged and no tenderness, skin normal, No bruits. LUNGS:  No increased work of breathing, good air exchange, clear to auscultation bilaterally, no crackles, no wheezing  CARDIOVASCULAR:  Normal apical impulse, regular rate and rhythm, normal S1 and S2,  2/6 Systolic murmur noted. ABDOMEN:  Obese, normal bowel sounds, soft, non-distended, non-tender, no masses palpated, no hepatosplenomegally  EXTREMETIES: mild bilateral edema, Pulses deminished Thruout. Right 3rd toe ulcer. Good cappliary refill. NEUROLOGIC:  Awake, alert, oriented to name, place and time. Following all commands and moving all extremties.   SKIN:  no bruising or bleeding, normal skin (L) 3.5 - 4.6 g/dL    Total Bilirubin 0.3 0.2 - 0.7 mg/dL    Alkaline Phosphatase 66 40 - 130 U/L    ALT 10 0 - 33 U/L    AST 13 0 - 35 U/L    Globulin 2.9 2.3 - 3.5 g/dL   Magnesium    Collection Time: 05/27/19  7:20 AM   Result Value Ref Range    Magnesium 2.1 1.7 - 2.4 mg/dL   High sensitivity CRP    Collection Time: 05/27/19  7:20 AM   Result Value Ref Range    CRP High Sensitivity 14.7 (H) 0.0 - 5.0 mg/L   Troponin    Collection Time: 05/27/19  7:20 AM   Result Value Ref Range    Troponin 0.039 (HH) 0.000 - 0.010 ng/mL   Lactic acid, plasma    Collection Time: 05/27/19  7:21 AM   Result Value Ref Range    Lactic Acid 0.6 0.5 - 2.2 mmol/L   Sedimentation Rate    Collection Time: 05/27/19  7:21 AM   Result Value Ref Range    Sed Rate 44 (H) 0 - 30 mm   POCT Glucose    Collection Time: 05/27/19  8:36 AM   Result Value Ref Range    POC Glucose 72 60 - 115 mg/dl    Performed on ACCU-CHEK    SPECIMEN REJECTION    Collection Time: 05/27/19 10:20 AM   Result Value Ref Range    Rejected Test CXWND     Reason for Rejection see below      Carotid US:  Bilateral ICA 50-69%     ECG:                 SR.  Ant MI, old  NSSTS o/w.     HOLTER MONITOR:  5/14/19  Sinus Rhtyhm, Avg 70,  Min Max . Rare APCs and PVCs. No PSVT or VT. ECHO:  5/16/19                          Moderate Apical LV Hypokinesis, LVEF 50%. Moderate CLVH with Diastolic Heart Disease Noted. MAC with MOderate MR. Moderate TR.        LEXISCAN MYOCARDIAL PERFUSION STUDY  PERFORMED: 05/14/2019  1. Abnormal Lexiscan Myoview cardiac perfusion stress test.  2. Moderate distal anterior myocardial infarction with a mild degree of ijeoma-infarct ischemia. 3. Severe apical myocardial infarction. 4. Mild inferior anteroseptal myocardial ischemia. 5. Normal LV systolic function with calculated left ventricular ejection fraction 54% and apical hypokinesis as described above. 6. Comparison: No previous study is available for comparison.   7. COMMENTS:  8. High risk myocardial perfusion study based on multiple perfusion abnormalities. 9. Clinical correlation is advised.     PVR Rest / EX LEGS:  5/16/19  Severe bilateral lower extremity atherosclerotic disease, possibly worse on the left reduced specificity and sensitivity is noted due to noncompressibility of the high thigh and low thigh cuffs. Toe pressures also indicate significant disease, and even some poor wound healing. JIMI; Right . 44, Left .32. Magui Lyric CTA ABD and LL RO:  Preliminary Reviewed. Diffuse PVD with Bilateral Trifurction PVOD. Full Report to follow.         Dennise Mckeon MD ,[facility] Cardiology

## 2019-05-27 NOTE — PROGRESS NOTES
Pharmacy Vancomycin Consult     Vancomycin Day: 2  Current Dosing: Vancomycin 1250mg IV q12hr    Temp max:  98.6F    Recent Labs     05/25/19  1330 05/26/19  0756   BUN 31* 22       Recent Labs     05/25/19  1330 05/26/19  0756   CREATININE 0.84 0.75       Recent Labs     05/25/19  1330 05/26/19  0756   WBC 9.8 7.8         Intake/Output Summary (Last 24 hours) at 5/26/2019 2356  Last data filed at 5/26/2019 1928  Gross per 24 hour   Intake 3525 ml   Output 1400 ml   Net 2125 ml       Culture Date      Source                       Results  Recent Labs     05/25/19  1529   BC No Growth to date. Any change in status will be called. Recent Labs     05/25/19  1529   BLOODCULT2 No Growth to date. Any change in status will be called. Ht Readings from Last 1 Encounters:   05/25/19 5' 4\" (1.626 m)        Wt Readings from Last 1 Encounters:   05/25/19 198 lb (89.8 kg)         Body mass index is 33.99 kg/m². Estimated Creatinine Clearance: 85 mL/min (based on SCr of 0.75 mg/dL). Trough: 20.7, drawn incorrectly 10 min after dose started. Assessment/Plan:  Floor called regarding critical level. It was noted bag was started before lab was collected. RN informed to continue dose and level would be obtained again with next dose 5/27. RN called back and informed pharmacy remainder of bag was thrown out by another nurse without discussing with pharmacy. Decision made to document only half dose given. Will resume dosing in the morning and have level drawn tomorrow night after another full dose is given. Renal function stable since admission. Thank you,    TREVOR Nguyen. Ph.  5/27/2019  12:01 AM

## 2019-05-28 ENCOUNTER — APPOINTMENT (OUTPATIENT)
Dept: CARDIAC CATH/INVASIVE PROCEDURES | Age: 64
DRG: 344 | End: 2019-05-28
Payer: MEDICARE

## 2019-05-28 PROBLEM — M86.10 ACUTE OSTEOMYELITIS (HCC): Chronic | Status: ACTIVE | Noted: 2019-05-14

## 2019-05-28 LAB
ALBUMIN SERPL-MCNC: 3.4 G/DL (ref 3.5–4.6)
ALP BLD-CCNC: 71 U/L (ref 40–130)
ALT SERPL-CCNC: 12 U/L (ref 0–33)
ANION GAP SERPL CALCULATED.3IONS-SCNC: 10 MEQ/L (ref 9–15)
AST SERPL-CCNC: 15 U/L (ref 0–35)
BILIRUB SERPL-MCNC: 0.3 MG/DL (ref 0.2–0.7)
BUN BLDV-MCNC: 20 MG/DL (ref 8–23)
CALCIUM SERPL-MCNC: 9 MG/DL (ref 8.5–9.9)
CHLORIDE BLD-SCNC: 103 MEQ/L (ref 95–107)
CO2: 27 MEQ/L (ref 20–31)
CREAT SERPL-MCNC: 0.89 MG/DL (ref 0.5–0.9)
EKG ATRIAL RATE: 68 BPM
EKG ATRIAL RATE: 71 BPM
EKG ATRIAL RATE: 72 BPM
EKG ATRIAL RATE: 76 BPM
EKG ATRIAL RATE: 78 BPM
EKG P AXIS: 40 DEGREES
EKG P AXIS: 45 DEGREES
EKG P AXIS: 51 DEGREES
EKG P AXIS: 53 DEGREES
EKG P AXIS: 56 DEGREES
EKG P-R INTERVAL: 154 MS
EKG P-R INTERVAL: 162 MS
EKG P-R INTERVAL: 170 MS
EKG P-R INTERVAL: 176 MS
EKG P-R INTERVAL: 178 MS
EKG Q-T INTERVAL: 384 MS
EKG Q-T INTERVAL: 398 MS
EKG Q-T INTERVAL: 404 MS
EKG Q-T INTERVAL: 410 MS
EKG Q-T INTERVAL: 414 MS
EKG QRS DURATION: 88 MS
EKG QRS DURATION: 94 MS
EKG QRS DURATION: 96 MS
EKG QRS DURATION: 98 MS
EKG QRS DURATION: 98 MS
EKG QTC CALCULATION (BAZETT): 429 MS
EKG QTC CALCULATION (BAZETT): 432 MS
EKG QTC CALCULATION (BAZETT): 437 MS
EKG QTC CALCULATION (BAZETT): 448 MS
EKG QTC CALCULATION (BAZETT): 465 MS
EKG R AXIS: 1 DEGREES
EKG R AXIS: 11 DEGREES
EKG R AXIS: 13 DEGREES
EKG R AXIS: 19 DEGREES
EKG R AXIS: 20 DEGREES
EKG T AXIS: -29 DEGREES
EKG T AXIS: -56 DEGREES
EKG T AXIS: -84 DEGREES
EKG T AXIS: 106 DEGREES
EKG T AXIS: 157 DEGREES
EKG VENTRICULAR RATE: 68 BPM
EKG VENTRICULAR RATE: 71 BPM
EKG VENTRICULAR RATE: 72 BPM
EKG VENTRICULAR RATE: 76 BPM
EKG VENTRICULAR RATE: 78 BPM
GFR AFRICAN AMERICAN: >60
GFR NON-AFRICAN AMERICAN: >60
GLOBULIN: 3.2 G/DL (ref 2.3–3.5)
GLUCOSE BLD-MCNC: 107 MG/DL (ref 60–115)
GLUCOSE BLD-MCNC: 183 MG/DL (ref 60–115)
GLUCOSE BLD-MCNC: 61 MG/DL (ref 60–115)
GLUCOSE BLD-MCNC: 66 MG/DL (ref 70–99)
GLUCOSE BLD-MCNC: 76 MG/DL (ref 60–115)
HCT VFR BLD CALC: 33.6 % (ref 37–47)
HEMOGLOBIN: 11.5 G/DL (ref 12–16)
LACTIC ACID: 1 MMOL/L (ref 0.5–2.2)
MCH RBC QN AUTO: 25.1 PG (ref 27–31.3)
MCHC RBC AUTO-ENTMCNC: 34.3 % (ref 33–37)
MCV RBC AUTO: 73.2 FL (ref 82–100)
PDW BLD-RTO: 16.2 % (ref 11.5–14.5)
PERFORMED ON: ABNORMAL
PERFORMED ON: NORMAL
PLATELET # BLD: 221 K/UL (ref 130–400)
POTASSIUM REFLEX MAGNESIUM: 4.2 MEQ/L (ref 3.4–4.9)
POTASSIUM SERPL-SCNC: 4.2 MEQ/L (ref 3.4–4.9)
RBC # BLD: 4.58 M/UL (ref 4.2–5.4)
SODIUM BLD-SCNC: 140 MEQ/L (ref 135–144)
TOTAL PROTEIN: 6.6 G/DL (ref 6.3–8)
WBC # BLD: 9.7 K/UL (ref 4.8–10.8)

## 2019-05-28 PROCEDURE — 93458 L HRT ARTERY/VENTRICLE ANGIO: CPT | Performed by: INTERNAL MEDICINE

## 2019-05-28 PROCEDURE — B2111ZZ FLUOROSCOPY OF MULTIPLE CORONARY ARTERIES USING LOW OSMOLAR CONTRAST: ICD-10-PCS | Performed by: INTERNAL MEDICINE

## 2019-05-28 PROCEDURE — 85027 COMPLETE CBC AUTOMATED: CPT

## 2019-05-28 PROCEDURE — C1769 GUIDE WIRE: HCPCS

## 2019-05-28 PROCEDURE — 6370000000 HC RX 637 (ALT 250 FOR IP): Performed by: INTERNAL MEDICINE

## 2019-05-28 PROCEDURE — 2580000003 HC RX 258: Performed by: INTERNAL MEDICINE

## 2019-05-28 PROCEDURE — 36222 PLACE CATH CAROTID/INOM ART: CPT | Performed by: INTERNAL MEDICINE

## 2019-05-28 PROCEDURE — 93005 ELECTROCARDIOGRAM TRACING: CPT | Performed by: INTERNAL MEDICINE

## 2019-05-28 PROCEDURE — 99232 SBSQ HOSP IP/OBS MODERATE 35: CPT | Performed by: INTERNAL MEDICINE

## 2019-05-28 PROCEDURE — 2580000003 HC RX 258: Performed by: PHYSICIAN ASSISTANT

## 2019-05-28 PROCEDURE — 2700000000 HC OXYGEN THERAPY PER DAY

## 2019-05-28 PROCEDURE — 4A023N7 MEASUREMENT OF CARDIAC SAMPLING AND PRESSURE, LEFT HEART, PERCUTANEOUS APPROACH: ICD-10-PCS | Performed by: INTERNAL MEDICINE

## 2019-05-28 PROCEDURE — 2580000003 HC RX 258

## 2019-05-28 PROCEDURE — 83605 ASSAY OF LACTIC ACID: CPT

## 2019-05-28 PROCEDURE — 6360000002 HC RX W HCPCS: Performed by: INTERNAL MEDICINE

## 2019-05-28 PROCEDURE — 6360000002 HC RX W HCPCS

## 2019-05-28 PROCEDURE — 36415 COLL VENOUS BLD VENIPUNCTURE: CPT

## 2019-05-28 PROCEDURE — 2709999900 HC NON-CHARGEABLE SUPPLY

## 2019-05-28 PROCEDURE — 2500000003 HC RX 250 WO HCPCS

## 2019-05-28 PROCEDURE — 80053 COMPREHEN METABOLIC PANEL: CPT

## 2019-05-28 PROCEDURE — B4101ZZ FLUOROSCOPY OF ABDOMINAL AORTA USING LOW OSMOLAR CONTRAST: ICD-10-PCS | Performed by: INTERNAL MEDICINE

## 2019-05-28 PROCEDURE — 75630 X-RAY AORTA LEG ARTERIES: CPT | Performed by: INTERNAL MEDICINE

## 2019-05-28 PROCEDURE — B2151ZZ FLUOROSCOPY OF LEFT HEART USING LOW OSMOLAR CONTRAST: ICD-10-PCS | Performed by: INTERNAL MEDICINE

## 2019-05-28 PROCEDURE — 2060000000 HC ICU INTERMEDIATE R&B

## 2019-05-28 RX ORDER — SODIUM CHLORIDE 0.9 % (FLUSH) 0.9 %
10 SYRINGE (ML) INJECTION PRN
Status: DISCONTINUED | OUTPATIENT
Start: 2019-05-28 | End: 2019-05-31 | Stop reason: HOSPADM

## 2019-05-28 RX ORDER — SODIUM CHLORIDE 0.9 % (FLUSH) 0.9 %
10 SYRINGE (ML) INJECTION EVERY 12 HOURS SCHEDULED
Status: DISCONTINUED | OUTPATIENT
Start: 2019-05-28 | End: 2019-05-31 | Stop reason: HOSPADM

## 2019-05-28 RX ORDER — ACETAMINOPHEN 325 MG/1
650 TABLET ORAL EVERY 4 HOURS PRN
Status: DISCONTINUED | OUTPATIENT
Start: 2019-05-28 | End: 2019-05-31 | Stop reason: HOSPADM

## 2019-05-28 RX ADMIN — PIPERACILLIN AND TAZOBACTAM 3.38 G: 3; .375 INJECTION, POWDER, FOR SOLUTION INTRAVENOUS at 07:00

## 2019-05-28 RX ADMIN — ATORVASTATIN CALCIUM 40 MG: 40 TABLET, FILM COATED ORAL at 21:44

## 2019-05-28 RX ADMIN — MECLIZINE HYDROCHLORIDE 25 MG: 25 TABLET ORAL at 09:22

## 2019-05-28 RX ADMIN — SODIUM CHLORIDE: 9 INJECTION, SOLUTION INTRAVENOUS at 05:06

## 2019-05-28 RX ADMIN — DEXTROSE MONOHYDRATE 12.5 G: 25 INJECTION, SOLUTION INTRAVENOUS at 13:12

## 2019-05-28 RX ADMIN — NITROGLYCERIN 1 INCH: 20 OINTMENT TOPICAL at 21:44

## 2019-05-28 RX ADMIN — MONTELUKAST 10 MG: 10 TABLET, FILM COATED ORAL at 09:22

## 2019-05-28 RX ADMIN — FUROSEMIDE 40 MG: 40 TABLET ORAL at 21:44

## 2019-05-28 RX ADMIN — LISINOPRIL 10 MG: 10 TABLET ORAL at 09:22

## 2019-05-28 RX ADMIN — PIPERACILLIN AND TAZOBACTAM 3.38 G: 3; .375 INJECTION, POWDER, FOR SOLUTION INTRAVENOUS at 21:44

## 2019-05-28 RX ADMIN — Medication 10 ML: at 21:44

## 2019-05-28 RX ADMIN — SODIUM CHLORIDE: 9 INJECTION, SOLUTION INTRAVENOUS at 21:44

## 2019-05-28 RX ADMIN — SERTRALINE HYDROCHLORIDE 50 MG: 50 TABLET ORAL at 09:22

## 2019-05-28 RX ADMIN — LEVOTHYROXINE SODIUM 50 MCG: 50 TABLET ORAL at 05:06

## 2019-05-28 RX ADMIN — METOPROLOL SUCCINATE 50 MG: 50 TABLET, EXTENDED RELEASE ORAL at 09:22

## 2019-05-28 RX ADMIN — NITROGLYCERIN 1 INCH: 20 OINTMENT TOPICAL at 05:06

## 2019-05-28 RX ADMIN — MUPIROCIN: 20 OINTMENT TOPICAL at 11:07

## 2019-05-28 RX ADMIN — MECLIZINE HYDROCHLORIDE 25 MG: 25 TABLET ORAL at 21:44

## 2019-05-28 ASSESSMENT — PAIN SCALES - GENERAL
PAINLEVEL_OUTOF10: 4
PAINLEVEL_OUTOF10: 0

## 2019-05-28 NOTE — PROGRESS NOTES
PODIATRIC MEDICINE AND SURGERY  PROGRESS NOTE    Follow up regarding: Right third toe ulcer    ASSESSMENT:  This 66 y.o. female with significant PMH of DM2, HTN, HLD, HF, PVD presented with ischemic ulcer grade 3 of the right 3rd toe with undering osteomyelitis. Wound is currently stable an patient is on IV Zosyn. No acute podiatric intervention at this time, will re-assess following angio. PLAN AND RECOMMENDATIONS:  Plans pending results of angio today  Weight bearing as tolerated to RLE  Elevate BLE at or above heart level while resting  Dressing change today consisted of Betadine to wound bed with dry 2x2 to the webspace. Dressing change orders in place for daily Bactroban to wound bed with dry 2x2 to the webspace. Antibiotic coverage per ID recommendations   Pain management per primary team   Podiatry to follow while in house     INTERVAL HISTORY:   Afebrile with vital signs stable. No leukocytosis. To go for angio today with interventional cardiology. Continued sharp pain in the toe. Denies nausea, vomiting, diarrhea, fevers, chills, chest pain, shortness of breath, head ache, or calf pain. No other pedal complaints. Pertinent history: 66y.o. year old female with significant PMH of DM2, HTN, HLD, HF, PVD seen for chronic ulcer right 3rd toe. Has been applying the Bactroban daily to the wound. She saw a cardiologist for her circulation. Her ulcer has started draining a clear fluid since last seeing Dr. Bryce Salvador, but no redness or swelling of the foot. MRI + for OM of the proximal phalanx. No current facility-administered medications on file prior to encounter.       Current Outpatient Medications on File Prior to Encounter   Medication Sig Dispense Refill    lisinopril (PRINIVIL;ZESTRIL) 10 MG tablet Take 10 mg by mouth daily      ibuprofen (IBU) 400 MG tablet Take 1 tablet by mouth every 6 hours as needed for Pain 20 tablet 0    sertraline (ZOLOFT) 50 MG tablet Take 1 tablet by mouth daily 30 tablet 0    insulin lispro (HUMALOG) 100 UNIT/ML injection vial Inject 0-6 Units into the skin 3 times daily (with meals) 1 vial 3    insulin lispro (HUMALOG) 100 UNIT/ML injection vial Inject 5 Units into the skin 3 times daily (with meals) 1 vial 3    insulin glargine (LANTUS) 100 UNIT/ML injection vial Inject 35 Units into the skin daily 1 vial 3    metoprolol succinate (TOPROL XL) 50 MG extended release tablet Take 1 tablet by mouth daily 30 tablet 3    levothyroxine (SYNTHROID) 50 MCG tablet Take 50 mcg by mouth Daily      furosemide (LASIX) 40 MG tablet Take 40 mg by mouth 2 times daily      simvastatin (ZOCOR) 40 MG tablet Take 40 mg by mouth daily      meclizine (ANTIVERT) 25 MG tablet Take 25 mg by mouth three times daily      metFORMIN (GLUCOPHAGE) 1000 MG tablet Take 1,000 mg by mouth every 12 hours      montelukast (SINGULAIR) 10 MG tablet Take 10 mg by mouth daily      SODIUM CHLORIDE, EXTERNAL, 0.9 % SOLN Apply 1 Applicatorful topically daily 1000 mL 2    sodium polystyrene (SPS) 15 GM/60ML suspension Take 60 mLs by mouth 2 times daily for 2 doses 120 mL 0    metoprolol succinate (TOPROL XL) 25 MG extended release tablet Take 1 tablet by mouth daily 30 tablet 3     REVIEW OF SYSTEMS  See interval history    OBJECTIVE:  BP (!) 139/57   Pulse 85   Temp 98.4 °F (36.9 °C) (Oral)   Resp 18   Ht 5' 4\" (1.626 m)   Wt 199 lb 4.7 oz (90.4 kg)   SpO2 98%   BMI 34.21 kg/m²   Patient is alert and oriented x 3 in NAD.      Vascular:   Non Palpable Dorsalis Pedis and non Palpable Posterior Tibial Pulses B/L   Capillary Fill time < 5 seconds to B/L digits  Skin temperature warm to cool tibial tuberosity to the digits B/L  Hair growth present to digits  Mild edema  No varicosities   Dysvascular changes to the right 3rd toe    Neurological:   Epicritic sensation intact B/L  Protective sensation via monofilament testing intact B/L    Musculoskeletal/Orthopaedic:   5/5 muscle strength Dorsiflexion,

## 2019-05-28 NOTE — PROGRESS NOTES
Hemostasis to right groin after 15 min of pressure. Pressure dressing and sandbag applied.   Taking food and fluids and family at bedside

## 2019-05-28 NOTE — PROCEDURES
Diagnostic Cardiac Cath Lab Procedure  Date/Time: 2019 6:51 PM  Performed by: Rose Hickey MD  Authorized by: Rose Hickey MD                                   [facility] [address] [place] [address], [address] [place] [address]                                 CARDIAC CATHETERIZATION       PATIENT NAME:  Diane Neal                       :          1955  MED REC NO:    [id number]                         ACCOUNT NO:  [de-identified]     PROVIDER:      Cari Hamilton MD     DATE OF PROCEDURE:  19     PROCEDURES:    Left heart catheterization,   Selective left ventriculography,  Selective coronary cineangiography,   Arch Aortogram with Great neck vessel Runoffs, digital subtraction,   Bilateral Selective Carotid Angiograms, Digital subtraction,  Abdominal Aortography, DIgital Subtraction,  Bilateral Long Leg Angiography, selective, Digital subtraction,  Conscious sedation.     INDICATIONS:  POC for Ischemic Right Toe; Severe Carotid, Coronary and PVOD suggested by noninvasive testing.      TECHNIQUE:  After obtaining informed consent, the patient was brought to  the catheterization laboratory, where the right groin was prepped and  draped in the usual sterile fashion. Using 1% local Xylocaine anesthesia,  4-Slovak right femoral arterial sheath was placed without difficulty. A  4-Slovak pigtail catheter was placed across the aortic valve in the left  ventricle and left ventriculography was performed using 20 mL of contrast.   The catheter was then exchanged to 4-Slovak left and right Mony coronary  catheters and left and right selective coronary cineangiography was  performed using multiple angle views. At the end of the procedure,  catheter and sheaths were removed. Hemostasis was maintained using manual  compression.   There were no complications.     CONTRAST:  240 mL.     CONSCIOUS SEDATION:      2 mg of intravenous Versed;   25 mg of intravenous Fentanyl. RESULTS:      CARDIAC CATHETERIZATION:     Hemodynamics:    /30, /60. There was no aortic valve gradient noted on pullback pressure across the aortic valve.     Left ventriculography:    MOYA left ventriculography revealed Severe Apical Diskinesis with otherwise preserve LV Function. Left ventricular ejection fraction overall was 35-45%. There was 2+ mitral regurgitation.     Coronary cineangiography:      Left Main:    Normal.     Left anterior descending:    Large-caliber vessel with proximal and mid calcification. Mid LAD after first diagonal with 85-95% stenosis; mid distal LAD after second diagonal with 80% stenosis. Distal LAD wraparound vessel. Diagonal 1 and diagonal 2 small caliber vessels with moderate disease.     Left circumflex: Moderately large vessel with mid left circumflex 80% focal stenosis. Large second obtuse marginal branch without disease. Obtuse marginal 3 small vessel with diffuse 80% disease.     Right coronary artery:    Large dominant vessel with 100% ostial occlusion with right to right and left to right collaterals filling the distal vessel. Posterolateral and PDA were visualized vessels by collaterals from the left system. There were no fixed or reversible stenoses identified otherwise. Abdominal aortography, digital subtraction including bilateral lower extremity extremity selective angiography runoff:    SMA and celiac artery visualized without significant disease. Renal arteries with normal right renal artery, 40% proximal left renal artery. Distal abdominal aorta without AAA and with mild PVD 20% luminal irregularities. Aortic bifurcation and bilateral iliac arteries were free of significant. Right lower extremity selective angiography:   Right SFA proximal mid free of significant disease with distal long 50-60% disease. Right popliteal 100% mid occlusion.   Right trifurcation disease with diffuse 3 vessel disease involving 100% posterior tibial and anterior tibial and bone severely diseased peroneal vessel with significant collaterals. Left lower extremity selective angiography:  Left SFA with long distal SFA 60-70% stenosis only. Left popliteal mid distal diffuse 95% disease. Left trifurcation disease with diffuse three-vessel disease including three-vessel occlusion with significant collateral flow reconstitution distally. Carotid selective arteriography bilateral:.    Right internal carotid artery 50-60% proximal, calcified, ulcerative. Left internal carotid artery 70-80% proximal, calcified, ulcerative. IMPRESSION:    1. Abnormal cardiac catheterization. 2   Severe three-vessel coronary artery disease with 95% mid LAD, 80% distal LAD, 80% mid left circumflex, 100% right coronary ostial occlusion with distal PL/PDA collaterals from left and right systems. 3.  Ischemic cardiomyopathy with moderate sized apical dyskinesia, LVEF 35-40%. 4.  Moderate mitral regurgitation  5. Left renal artery 40% proximal stenosis   6. Severe peripheral vascular disease with bilateral distal SFA and bilateral three-vessel below the knee trifurcation disease as noted above. 7.  Bilateral internal carotid artery proximal stenoses, right 50-60%; left 70-80%.     RECOMMENDATIONS:      The patient with above-noted findings. Would suggest maximized medical care. Will review films with podiatry regarding peripheral vascular treatment. Probable complex revascularization at tertiary center if felt necessary. Probable bypass surgery versus high-risk multivessel stent angioplasty, will reviewed. Vascular surgery for bilateral internal carotid arteries. Maximal IV and by mouth hydration for post Dye load.   Further recommendations to follow.          Anya Head MD  [facility]       05/28/19

## 2019-05-28 NOTE — PROGRESS NOTES
Patient blood sugar was 61 for lunch accucheck. Gave d50 amp and patient's recheck is 107. Patient denies feeling symptomatic.      Electronically signed by Nati Estrada RN on 5/28/2019 at 4:44 PM

## 2019-05-28 NOTE — PROGRESS NOTES
Physical Therapy Missed Treatment   Facility/Department: [facility] MED SURG [id number]/[id number]    NAME: Jw Salomon    : 1955 (89 y.o.)  MRN: [id number]    Account: [de-identified]  Gender: female        [x] Patient Declines PT Treatment: states she just returned to bed, going for procedure later this PM.       Will attempt PT treatment again at earliest convenience.         Electronically signed by Huyen Thompson PTA on 19 at 11:53 AM

## 2019-05-28 NOTE — CARE COORDINATION
IV BENEFIT REQUEST FORM    FAX FROM: [facility]                        [address] [place]    REQUESTED BY: Electronically signed by hCepe Miles RN on 2019 at 10:48 AM                                               RN/C3: PHONE: [phone]     DATE:/TIME OF REQUEST: 19  TIME: 10:48 AM      TO: Shelton Valdeznivia Mercer [address] TO: [phone]    PHONE: [phone]     THIS PATIENT HAS BEEN IDENTIFIED TO POSSIBLY NEED LONG TERM IV'S. PLEASE CHECK INSURANCE COVERAGE FOR THE FOLLOWING PT/DRUGS. PATIENT'S NAMESSM [facility]                              ROOM: [place] [id number]   PATIENT'S : 1955  PATIENT ADDRESS: [address] [place] [address]  SSN:    [id number])     PAYOR NAME:  Payor: Nell Sewell / Plan: Nell Sewell / Product Type: *No Product type* /     DRUG: (ZOSYN)                         DOSE: (3.375)            FREQUENCY: Q (8) HR    DRUG: ()                         DOSE: ()            FREQUENCY: Q () HR    DRUG: ()                         DOSE: ()            FREQUENCY: Q () HR    __________ CHECK HERE IF PT HAS NO INSURANCE AND REQUESTING SELF PAY COST. *IF [facility] IS NOT A PROVIDER FOR THIS PATIENT, PLEASE FORWARD INFO VIA FAX TO [facility] @ [phone],(PHONE NUMBER: [phone]) TO RUN BENEFIT VERIFICATION AND NOTIFY THE ABOVE C3 OF THIS PLAN. (FAX FACE SHEET WITH DEMOGRAPHICS AND INSURANCE INFO WITH THIS FORM.)  PLEASE FAX BENEFIT INFO TO: THE [phone]    This message is intended only for the use of the individual or entity to which it is addressed and may contain information that is privileged, confidential, and exempt from disclosure under applicable law.  If the reader of the notice is not intended recipient of the employee/agent responsible for delivering the message to the intended recipient, you are hereby notified

## 2019-05-28 NOTE — PROGRESS NOTES
Arrived to pre/post from the cath lab. 4 Maori sheath in right groin to be pulled. No bleeding or hematoma. Report from [facility]. Attached to monitor and vitals are stable.   Will continue to monitor

## 2019-05-28 NOTE — PROGRESS NOTES
Used blue phone to update patient on POC, for translation. Patient had no questions but requested not to take her lasix this morning, in case her procedure got moved up. Denies any pain. VS stable.   at bedside

## 2019-05-28 NOTE — PROGRESS NOTES
CARDIOLOGY [address] PROGRESS NOTE         5/28/2019      Latoya Bell    [id number]  1955    Rounding MD: Zulema Ferguson MD ,[facility]    Primary Cardiologist:  Fabian Alanis MD, [address]    SUBJECTIVE:    Patient has No New Cardiac Complaints. Right toe still hurts. On ABX for Osteomyolitis. Has Significant CAD, Carotid Dz and PVD suggested by recent Studies as noted below. Will Plan Cardiac Cath with PVR and Carotid anngiograms in AM.  Discussed at length with patient and  via . Wish to proceed. Also discussed case with Dr. Carolin Keith, possible Amputation Friday. Cardiac and general ROS otherwise negative and unchanged.       Assessment:        1. Right foot toe ulcer / Osteomyoliltis. 2. Claudication symptoms lower extremities  3. Severe PVOD with Abormal PVR suggesting  4. Palpitations, with benign Holter 5/19  5. Lightheadedness  6. Bradycardia  7. Syncope, presyncope  8. Hx Chest pain, Probable CAD with Abnormal ECHO and MYOVIEW as Below. 9. Ischemic Cardiomyopathy, LVEF 50% ( Moderate Apical Hypokinesis / MI )  10. History of heart failure  11. Hypothyroidism  12. Hypertension  13. Hyperlipidemia  14. Diabetes  15. Depression  16. Schizophrenia  17. Family history of coronary artery disease  25. Multiple allergies  19. Kyrgyz Speaking only.     Plan:     1. Cardiac Supportive Care  2. Lovenox for now, hold for cath in AM.  3. Cardiac Cath, carotids and LL Runoff in AM, possible PCI and PTAs as needed. 4. Continue Cardiac Medications as noted. 5. Further Recommendations to follow  6. See Orders           HISTORY OF PRESENT ILLNESS:      Latoya Bell is a pleasant 66 y.o. female who presented with right tow pain and ulcer.     Patient Follows with Fabian Alanis MD.     Patient History and Records, EMR reviewed. Patient Interviewed and examined. Kyrgyz Speaking only.    utilized.     Patient was recently seen by myself in the office per the note below. She presented with multiple symptoms including claudication and right toe ulcer. She appeared stable at that time and further cardiac testing was obtained.       She's was seen by Dr. Campbell Setting podiatry for her ulcer care. She's had progression of her symptomatology. She presents now with the pain. She has no coolness to her foot. She does have discoloration of her right toe second toe.     Recent studies are as noted below including abnormal Myoview perfusion study with moderate anterior myocardial infarction and mild inferior anteroseptal ischemia suggested; echocardiogram noted normal left ventricular function with left ventricular hypertrophy (ejection fraction 50%); 48 hour Holter monitor noted sinus rhythm with only APCs and PVCs rarely. PVR S exercise study lower extremities however noted noncompressible findings but suggestion of severe bilateral lower extremity vascular disease.     Denies CP, SOB, LH, Dizziness, TIA or CVA Symptoms. No Orthopnea, Edema or CHF symptoms. No Palpitations. No Syncope. No Fever, Chills or Cold symptoms. No GI,  or Bleeding complaints.     Cardiac and general ROS otherwise negative.     14 System ROS otherwise negative other than noted.     Past Medical History        Past Medical History:   Diagnosis Date    Asthma      Colitis      Diabetes mellitus ([facility]      Hyperlipidemia      Hypertension      PVD (peripheral vascular disease) ([facility]      Thyroid disease              Past Surgical History         Past Surgical History:   Procedure Laterality Date     SECTION        HYSTERECTOMY                Home Medications           Prior to Admission medications    Medication Sig Start Date End Date Taking?  Authorizing Provider   lisinopril (PRINIVIL;ZESTRIL) 10 MG tablet Take 10 mg by mouth daily     Yes Historical Provider, MD   ibuprofen (IBU) 400 MG tablet Take 1 tablet by mouth every 6 hours as needed for Pain 19   Yes Chip Jo Angelia Pearl PA-C   sertraline (ZOLOFT) 50 MG tablet Take 1 tablet by mouth daily 3/1/19   Yes Brian Lobo, APRN - CNP   insulin lispro (HUMALOG) 100 UNIT/ML injection vial Inject 0-6 Units into the skin 3 times daily (with meals) 2/28/19   Yes James Hunnewell, APRN - CNS   insulin lispro (HUMALOG) 100 UNIT/ML injection vial Inject 5 Units into the skin 3 times daily (with meals) 2/28/19   Yes James Hunnewell, APRN - CNS   insulin glargine (LANTUS) 100 UNIT/ML injection vial Inject 35 Units into the skin daily 2/25/19   Yes James Hunnewell, APRN - CNS   metoprolol succinate (TOPROL XL) 50 MG extended release tablet Take 1 tablet by mouth daily 2/25/19   Yes Jalil Perez APRN - CNS   levothyroxine (SYNTHROID) 50 MCG tablet Take 50 mcg by mouth Daily     Yes Historical Provider, MD   furosemide (LASIX) 40 MG tablet Take 40 mg by mouth 2 times daily     Yes Historical Provider, MD   simvastatin (ZOCOR) 40 MG tablet Take 40 mg by mouth daily     Yes Historical Provider, MD   meclizine (ANTIVERT) 25 MG tablet Take 25 mg by mouth three times daily     Yes Historical Provider, MD   metFORMIN (GLUCOPHAGE) 1000 MG tablet Take 1,000 mg by mouth every 12 hours     Yes Historical Provider, MD   montelukast (SINGULAIR) 10 MG tablet Take 10 mg by mouth daily     Yes Historical Provider, MD   SODIUM CHLORIDE, EXTERNAL, 0.9 % SOLN Apply 1 Applicatorful topically daily 4/23/19     Rhett Estes DPM   sodium polystyrene (SPS) 15 GM/60ML suspension Take 60 mLs by mouth 2 times daily for 2 doses 4/7/19 5/14/19   Estefania Carvajal PA-C   metoprolol succinate (TOPROL XL) 25 MG extended release tablet Take 1 tablet by mouth daily 2/25/19     James Arreaga, APRN - CNS                  OBJECTIVE:     MEDICATIONS:     Scheduled Meds:   piperacillin-tazobactam  3.375 g Intravenous Q8H    sodium chloride flush  10 mL Intravenous 2 times per day    furosemide  40 mg Oral BID    levothyroxine  50 mcg Oral Daily    all extremties.   SKIN:  no bruising or bleeding, normal skin color, texture, turgor and no rashes         Data:       LABS:  Recent Results (from the past 24 hour(s))   SPECIMEN REJECTION    Collection Time: 05/27/19 10:20 AM   Result Value Ref Range    Rejected Test CXWND     Reason for Rejection see below    POCT Glucose    Collection Time: 05/27/19 12:28 PM   Result Value Ref Range    POC Glucose 149 (H) 60 - 115 mg/dl    Performed on ACCU-CHEK    POCT Glucose    Collection Time: 05/27/19  5:12 PM   Result Value Ref Range    POC Glucose 123 (H) 60 - 115 mg/dl    Performed on ACCU-CHEK    POCT Glucose    Collection Time: 05/27/19 10:18 PM   Result Value Ref Range    POC Glucose 292 (H) 60 - 115 mg/dl    Performed on ACCU-CHEK    Basic Metabolic Panel w/ Reflex to MG    Collection Time: 05/28/19  6:47 AM   Result Value Ref Range    Potassium reflex Magnesium 4.2 3.4 - 4.9 mEq/L   Lactic acid, plasma    Collection Time: 05/28/19  6:47 AM   Result Value Ref Range    Lactic Acid 1.0 0.5 - 2.2 mmol/L   CBC    Collection Time: 05/28/19  6:47 AM   Result Value Ref Range    WBC 9.7 4.8 - 10.8 K/uL    RBC 4.58 4.20 - 5.40 M/uL    Hemoglobin 11.5 (L) 12.0 - 16.0 g/dL    Hematocrit 33.6 (L) 37.0 - 47.0 %    MCV 73.2 (L) 82.0 - 100.0 fL    MCH 25.1 (L) 27.0 - 31.3 pg    MCHC 34.3 33.0 - 37.0 %    RDW 16.2 (H) 11.5 - 14.5 %    Platelets 193 354 - 530 K/uL   Comprehensive Metabolic Panel    Collection Time: 05/28/19  6:47 AM   Result Value Ref Range    Sodium 140 135 - 144 mEq/L    Potassium 4.2 3.4 - 4.9 mEq/L    Chloride 103 95 - 107 mEq/L    CO2 27 20 - 31 mEq/L    Anion Gap 10 9 - 15 mEq/L    Glucose 66 (L) 70 - 99 mg/dL    BUN 20 8 - 23 mg/dL    CREATININE 0.89 0.50 - 0.90 mg/dL    GFR Non-African American >60.0 >60    GFR  >60.0 >60    Calcium 9.0 8.5 - 9.9 mg/dL    Total Protein 6.6 6.3 - 8.0 g/dL    Alb 3.4 (L) 3.5 - 4.6 g/dL    Total Bilirubin 0.3 0.2 - 0.7 mg/dL    Alkaline Phosphatase 71 40 - 130 U/L ALT 12 0 - 33 U/L    AST 15 0 - 35 U/L    Globulin 3.2 2.3 - 3.5 g/dL   POCT Glucose    Collection Time: 05/28/19  8:20 AM   Result Value Ref Range    POC Glucose 76 60 - 115 mg/dl    Performed on ACCU-CHEK      Carotid US:  Bilateral ICA 50-69%     ECG:                 SR.  Ant MI, old  NSSTS o/w.     HOLTER MONITOR:  5/14/19  Sinus Rhtyhm, Avg 70,  Min Max . Rare APCs and PVCs. No PSVT or VT. ECHO:  5/16/19                          Moderate Apical LV Hypokinesis, LVEF 50%. Moderate CLVH with Diastolic Heart Disease Noted. MAC with MOderate MR. Moderate TR.        LEXISCAN MYOCARDIAL PERFUSION STUDY  PERFORMED: 05/14/2019  1. Abnormal Lexiscan Myoview cardiac perfusion stress test.  2. Moderate distal anterior myocardial infarction with a mild degree of ijeoma-infarct ischemia. 3. Severe apical myocardial infarction. 4. Mild inferior anteroseptal myocardial ischemia. 5. Normal LV systolic function with calculated left ventricular ejection fraction 54% and apical hypokinesis as described above. 6. Comparison: No previous study is available for comparison. 7. COMMENTS:  8. High risk myocardial perfusion study based on multiple perfusion abnormalities. 9. Clinical correlation is advised.     PVR Rest / EX LEGS:  5/16/19  Severe bilateral lower extremity atherosclerotic disease, possibly worse on the left reduced specificity and sensitivity is noted due to noncompressibility of the high thigh and low thigh cuffs. Toe pressures also indicate significant disease, and even some poor wound healing. JIMI; Right . 44, Left .32. Radha Bond CTA ABD and LL RO:  Preliminary Reviewed. Diffuse PVD with Bilateral Trifurction PVOD. Full Report to follow.         Sudha Marr MD ,[address] [facility]

## 2019-05-28 NOTE — PROGRESS NOTES
Infectious Diseases Inpatient Progress Note          HISTORY OF PRESENT ILLNESS:  Follow up toe gangrene on IV Zosyn, well tolerated. Patient is scheduled for angiogram today and later during the week for toe amputation. Mild foot pain. Current Medications:     piperacillin-tazobactam  3.375 g Intravenous Q8H    sodium chloride flush  10 mL Intravenous 2 times per day    furosemide  40 mg Oral BID    levothyroxine  50 mcg Oral Daily    lisinopril  10 mg Oral Daily    meclizine  25 mg Oral TID    metoprolol succinate  50 mg Oral Daily    montelukast  10 mg Oral Daily    sertraline  50 mg Oral Daily    atorvastatin  40 mg Oral Nightly    nitroglycerin  1 inch Topical 3 times per day    sodium chloride flush  10 mL Intravenous 2 times per day    enoxaparin  40 mg Subcutaneous Daily    mupirocin   Topical Daily    insulin glargine  35 Units Subcutaneous Nightly    insulin lispro  0-12 Units Subcutaneous TID WC    insulin lispro  0-6 Units Subcutaneous Nightly       Allergies:  Ambien [zolpidem tartrate]; Capoten [captopril]; Clioquinol; Cogentin [benztropine]; Depakote [divalproex sodium]; Effexor xr [venlafaxine hcl er]; Geodon [ziprasidone hcl]; Navane [thiothixene]; Pamelor [nortriptyline hcl]; Remeron [mirtazapine]; Risperdal [risperidone]; Trazodone and nefazodone; and Wellbutrin [bupropion]      Review of Systems  14 system review is negative other than HPI    Physical Exam  Vitals:    05/26/19 0815 05/27/19 0600 05/27/19 0845 05/27/19 1945   BP: (!) 123/46  (!) 139/55 (!) 139/57   Pulse: 74  77 85   Resp: 18  18 18   Temp: 98.1 °F (36.7 °C)   98.4 °F (36.9 °C)   TempSrc: Oral   Oral   SpO2: 97%   98%   Weight:  199 lb 4.7 oz (90.4 kg)     Height:         General Appearance: alert and oriented to person, place and time, well-developed and well-nourished, in no acute distress  Skin: warm and dry, no rash.    Head: normocephalic and atraumatic  Eyes: anicteric sclerae  ENT: oropharynx clear and

## 2019-05-28 NOTE — PLAN OF CARE
Problem: Falls - Risk of:  Goal: Will remain free from falls  Description  Will remain free from falls  Outcome: Ongoing  Goal: Absence of physical injury  Description  Absence of physical injury  Outcome: Ongoing     Problem: Pain:  Goal: Pain level will decrease  Description  Pain level will decrease  Outcome: Ongoing  Goal: Control of acute pain  Description  Control of acute pain  Outcome: Ongoing  Goal: Control of chronic pain  Description  Control of chronic pain  Outcome: Ongoing     Problem: Mobility - Impaired:  Goal: Mobility will improve  Description  Mobility will improve  Outcome: Ongoing     Problem: Nutrition  Goal: Optimal nutrition therapy  5/28/2019 0308 by Rainer Friedman RN  Outcome: Ongoing  5/27/2019 1409 by Michael Lopes RD, LD  Outcome: Ongoing

## 2019-05-29 LAB
ALBUMIN SERPL-MCNC: 3.1 G/DL (ref 3.5–4.6)
ALP BLD-CCNC: 77 U/L (ref 40–130)
ALT SERPL-CCNC: 10 U/L (ref 0–33)
ANION GAP SERPL CALCULATED.3IONS-SCNC: 12 MEQ/L (ref 9–15)
AST SERPL-CCNC: 13 U/L (ref 0–35)
BILIRUB SERPL-MCNC: 0.3 MG/DL (ref 0.2–0.7)
BUN BLDV-MCNC: 14 MG/DL (ref 8–23)
CALCIUM SERPL-MCNC: 9.1 MG/DL (ref 8.5–9.9)
CHLORIDE BLD-SCNC: 101 MEQ/L (ref 95–107)
CO2: 25 MEQ/L (ref 20–31)
CREAT SERPL-MCNC: 0.8 MG/DL (ref 0.5–0.9)
GFR AFRICAN AMERICAN: >60
GFR NON-AFRICAN AMERICAN: >60
GLOBULIN: 3.3 G/DL (ref 2.3–3.5)
GLUCOSE BLD-MCNC: 131 MG/DL (ref 70–99)
GLUCOSE BLD-MCNC: 141 MG/DL (ref 60–115)
GLUCOSE BLD-MCNC: 147 MG/DL (ref 60–115)
GLUCOSE BLD-MCNC: 182 MG/DL (ref 60–115)
GLUCOSE BLD-MCNC: 285 MG/DL (ref 60–115)
HCT VFR BLD CALC: 32.6 % (ref 37–47)
HEMOGLOBIN: 11.7 G/DL (ref 12–16)
LACTIC ACID: 0.7 MMOL/L (ref 0.5–2.2)
MCH RBC QN AUTO: 25.8 PG (ref 27–31.3)
MCHC RBC AUTO-ENTMCNC: 35.9 % (ref 33–37)
MCV RBC AUTO: 71.8 FL (ref 82–100)
PDW BLD-RTO: 16.2 % (ref 11.5–14.5)
PERFORMED ON: ABNORMAL
PLATELET # BLD: 213 K/UL (ref 130–400)
POTASSIUM REFLEX MAGNESIUM: 4.2 MEQ/L (ref 3.4–4.9)
POTASSIUM SERPL-SCNC: 4.2 MEQ/L (ref 3.4–4.9)
RBC # BLD: 4.54 M/UL (ref 4.2–5.4)
SEDIMENTATION RATE, ERYTHROCYTE: 49 MM (ref 0–30)
SODIUM BLD-SCNC: 138 MEQ/L (ref 135–144)
TOTAL PROTEIN: 6.4 G/DL (ref 6.3–8)
TROPONIN: 0.02 NG/ML (ref 0–0.01)
WBC # BLD: 8.7 K/UL (ref 4.8–10.8)

## 2019-05-29 PROCEDURE — 6370000000 HC RX 637 (ALT 250 FOR IP): Performed by: INTERNAL MEDICINE

## 2019-05-29 PROCEDURE — 6360000002 HC RX W HCPCS: Performed by: INTERNAL MEDICINE

## 2019-05-29 PROCEDURE — 85652 RBC SED RATE AUTOMATED: CPT

## 2019-05-29 PROCEDURE — 2580000003 HC RX 258: Performed by: INTERNAL MEDICINE

## 2019-05-29 PROCEDURE — 97116 GAIT TRAINING THERAPY: CPT

## 2019-05-29 PROCEDURE — 2060000000 HC ICU INTERMEDIATE R&B

## 2019-05-29 PROCEDURE — 93005 ELECTROCARDIOGRAM TRACING: CPT | Performed by: INTERNAL MEDICINE

## 2019-05-29 PROCEDURE — 80053 COMPREHEN METABOLIC PANEL: CPT

## 2019-05-29 PROCEDURE — 85027 COMPLETE CBC AUTOMATED: CPT

## 2019-05-29 PROCEDURE — 83605 ASSAY OF LACTIC ACID: CPT

## 2019-05-29 PROCEDURE — 36415 COLL VENOUS BLD VENIPUNCTURE: CPT

## 2019-05-29 PROCEDURE — 84484 ASSAY OF TROPONIN QUANT: CPT

## 2019-05-29 RX ORDER — CILOSTAZOL 100 MG/1
50 TABLET ORAL 2 TIMES DAILY
Status: DISCONTINUED | OUTPATIENT
Start: 2019-05-29 | End: 2019-05-31 | Stop reason: HOSPADM

## 2019-05-29 RX ORDER — ATORVASTATIN CALCIUM 80 MG/1
80 TABLET, FILM COATED ORAL NIGHTLY
Status: DISCONTINUED | OUTPATIENT
Start: 2019-05-29 | End: 2019-05-31 | Stop reason: HOSPADM

## 2019-05-29 RX ORDER — METOPROLOL SUCCINATE 50 MG/1
50 TABLET, EXTENDED RELEASE ORAL 2 TIMES DAILY
Status: DISCONTINUED | OUTPATIENT
Start: 2019-05-29 | End: 2019-05-31 | Stop reason: HOSPADM

## 2019-05-29 RX ORDER — ISOSORBIDE MONONITRATE 60 MG/1
60 TABLET, EXTENDED RELEASE ORAL DAILY
Status: DISCONTINUED | OUTPATIENT
Start: 2019-05-29 | End: 2019-05-31 | Stop reason: HOSPADM

## 2019-05-29 RX ADMIN — METOPROLOL SUCCINATE 50 MG: 50 TABLET, EXTENDED RELEASE ORAL at 08:37

## 2019-05-29 RX ADMIN — ATORVASTATIN CALCIUM 80 MG: 80 TABLET, FILM COATED ORAL at 21:44

## 2019-05-29 RX ADMIN — CILOSTAZOL 50 MG: 100 TABLET ORAL at 12:32

## 2019-05-29 RX ADMIN — ISOSORBIDE MONONITRATE 60 MG: 60 TABLET, EXTENDED RELEASE ORAL at 12:32

## 2019-05-29 RX ADMIN — INSULIN LISPRO 2 UNITS: 100 INJECTION, SOLUTION INTRAVENOUS; SUBCUTANEOUS at 08:41

## 2019-05-29 RX ADMIN — METOPROLOL SUCCINATE 50 MG: 50 TABLET, EXTENDED RELEASE ORAL at 21:44

## 2019-05-29 RX ADMIN — MECLIZINE HYDROCHLORIDE 25 MG: 25 TABLET ORAL at 21:44

## 2019-05-29 RX ADMIN — LEVOTHYROXINE SODIUM 50 MCG: 50 TABLET ORAL at 06:53

## 2019-05-29 RX ADMIN — ENOXAPARIN SODIUM 40 MG: 40 INJECTION SUBCUTANEOUS at 08:37

## 2019-05-29 RX ADMIN — PIPERACILLIN AND TAZOBACTAM 3.38 G: 3; .375 INJECTION, POWDER, FOR SOLUTION INTRAVENOUS at 22:10

## 2019-05-29 RX ADMIN — MECLIZINE HYDROCHLORIDE 25 MG: 25 TABLET ORAL at 08:37

## 2019-05-29 RX ADMIN — MECLIZINE HYDROCHLORIDE 25 MG: 25 TABLET ORAL at 12:32

## 2019-05-29 RX ADMIN — NITROGLYCERIN 1 INCH: 20 OINTMENT TOPICAL at 06:53

## 2019-05-29 RX ADMIN — PIPERACILLIN AND TAZOBACTAM 3.38 G: 3; .375 INJECTION, POWDER, FOR SOLUTION INTRAVENOUS at 12:33

## 2019-05-29 RX ADMIN — INSULIN LISPRO 6 UNITS: 100 INJECTION, SOLUTION INTRAVENOUS; SUBCUTANEOUS at 12:35

## 2019-05-29 RX ADMIN — LISINOPRIL 10 MG: 10 TABLET ORAL at 08:37

## 2019-05-29 RX ADMIN — INSULIN GLARGINE 35 UNITS: 100 INJECTION, SOLUTION SUBCUTANEOUS at 21:47

## 2019-05-29 RX ADMIN — FUROSEMIDE 40 MG: 40 TABLET ORAL at 08:37

## 2019-05-29 RX ADMIN — PIPERACILLIN AND TAZOBACTAM 3.38 G: 3; .375 INJECTION, POWDER, FOR SOLUTION INTRAVENOUS at 06:53

## 2019-05-29 RX ADMIN — INSULIN LISPRO 2 UNITS: 100 INJECTION, SOLUTION INTRAVENOUS; SUBCUTANEOUS at 16:11

## 2019-05-29 RX ADMIN — CILOSTAZOL 50 MG: 100 TABLET ORAL at 21:43

## 2019-05-29 RX ADMIN — ENOXAPARIN SODIUM 40 MG: 40 INJECTION SUBCUTANEOUS at 21:44

## 2019-05-29 RX ADMIN — MUPIROCIN: 20 OINTMENT TOPICAL at 12:32

## 2019-05-29 RX ADMIN — SODIUM CHLORIDE: 9 INJECTION, SOLUTION INTRAVENOUS at 06:53

## 2019-05-29 RX ADMIN — MONTELUKAST 10 MG: 10 TABLET, FILM COATED ORAL at 08:37

## 2019-05-29 RX ADMIN — SERTRALINE HYDROCHLORIDE 50 MG: 50 TABLET ORAL at 08:37

## 2019-05-29 RX ADMIN — FUROSEMIDE 40 MG: 40 TABLET ORAL at 16:10

## 2019-05-29 ASSESSMENT — PAIN DESCRIPTION - PAIN TYPE
TYPE: ACUTE PAIN
TYPE: ACUTE PAIN

## 2019-05-29 ASSESSMENT — PAIN SCALES - GENERAL
PAINLEVEL_OUTOF10: 0
PAINLEVEL_OUTOF10: 0
PAINLEVEL_OUTOF10: 9
PAINLEVEL_OUTOF10: 0
PAINLEVEL_OUTOF10: 0

## 2019-05-29 ASSESSMENT — PAIN DESCRIPTION - LOCATION
LOCATION: FOOT
LOCATION: FOOT

## 2019-05-29 ASSESSMENT — PAIN DESCRIPTION - ORIENTATION
ORIENTATION: RIGHT
ORIENTATION: RIGHT

## 2019-05-29 NOTE — CONSULTS
[address] [facility] CARDIOLOGY CONSULTATION NOTE    PATIENT NAME: Bernabe Padron  PATIENT MRN: [id number]  SERVICE DATE:  5/29/2019  SERVICE TIME: 7:11 PM    PRIMARY CARE PHYSICIAN: Inessa Morelos  CONSULTING CARDIOLOGIST : David Smith **================================================================    REASON FOR CONSULTATION:      Interventional cardiology consultation request of Dr. Omero Palafox, in patient with extensive vascular disease, I am asked to [facility] on the cardiovascular disease revascularization. Patient is predominantly Armenian-speaking. All communication is through .  identified her name as Ana Escobedo.  at the bedside. Nurse Chanda at the bedside. Patient moved to [facility] in January 2019. She is planning to continue her care in the [facility]. She has long-term history of diabetes, she is diagnosed to have hypertension dyslipidemia peripheral vascular disease cerebrovascular disease and coronary artery disease. She reports having chest tightness and shortness of breath retrosternal burning sensation lightheadedness, and more recently pain and discoloration of her right toe. Dr. Omero Palafox performed selective carotid angiography, selective coronary angiography and left ventriculography, abdominal aortography and distal runoff. Next    I have reviewed the films. Opacification is suboptimal due to small catheter size, diffuse on area artery disease, and coronary artery calcification. The left main is short, I did not identify any significant or critical left main stenosis. The left circumflex artery continues as a major obtuse marginal branch, and there is an 80-90% stenosis in the AV groove left circumflex artery, which is am amenable to intervention and should yield good results.   The left anterior descending artery is diffusely calcified, somewhat tortuous, characteristic diabetic vasculopathy, and has 2 areas of significant disease, in the mid segment where there is about 80-90% tubular stenosis, and further downstream in the proximal segment of the distal LAD there is another 90% stenosis . The right coronary artery is dominant, and 100% occluded close to the ostium. Unable to say if there is any antegrade filling, but clearly there is grade 2-3 retrograde filling from the left system. I estimated left ventricular ejection fraction to be about 45% with apical akinesis. Medications vitals reviewed. On examination she is laying flat in bed, with no obvious distress, she is not volume overloaded. I appreciate right carotid bruit clear lung fields regular rate and rhythm without murmur rub or gallop right groin has mild tenderness to palpation, she can move all extremities, and answered questions to the  and to her . Her distal pulses are markedly diminished. I could not palpate them. She is alert and oriented ×3, grossly nonfocal.    Patient reports being hospitalized in [facility] in December and 2018 and 2019 respectively, with symptoms of chest discomfort and shortness of breath. She does not recall having had a cardiac catheterization at that time. PAST MEDICAL HISTORY:    Past Medical History:   Diagnosis Date    Asthma     Colitis     Diabetes mellitus ([address]     Hyperlipidemia     Hypertension     [facility] [address]     Thyroid disease        PAST SURGICAL HISTORY:  Past Surgical History:   Procedure Laterality Date     SECTION      HYSTERECTOMY         FAMILY HISTORY:  History reviewed. No pertinent family history.     SOCIAL HISTORY:    Social History     Socioeconomic History    Marital status:      Spouse name: Not on file    Number of children: Not on file    Years of education: Not on file    Highest education level: acetaminophen (TYLENOL) tablet 650 mg  650 mg Oral Q4H PRN Jose Gama MD        magnesium hydroxide (MILK OF MAGNESIA) 400 MG/5ML suspension 30 mL  30 mL Oral Daily PRN Jose Gama MD        enoxaparin (LOVENOX) injection 40 mg  40 mg Subcutaneous BID Jose Gama MD   40 mg at 05/29/19 0837    piperacillin-tazobactam (ZOSYN) 3.375 g in dextrose 5 % 50 mL IVPB extended infusion (mini-bag)  3.375 g Intravenous Gaby Lora MD   Stopped at 05/29/19 1635    furosemide (LASIX) tablet 40 mg  40 mg Oral BID Jose Gama MD   40 mg at 05/29/19 1610    levothyroxine (SYNTHROID) tablet 50 mcg  50 mcg Oral Daily Jose Gama MD   50 mcg at 05/29/19 0653    lisinopril (PRINIVIL;ZESTRIL) tablet 10 mg  10 mg Oral Daily Jose Gama MD   10 mg at 05/29/19 6624    meclizine (ANTIVERT) tablet 25 mg  25 mg Oral TID Jose Gama MD   25 mg at 05/29/19 1232    montelukast (SINGULAIR) tablet 10 mg  10 mg Oral Daily Jose Gama MD   10 mg at 05/29/19 0837    sertraline (ZOLOFT) tablet 50 mg  50 mg Oral Daily Jose Gama MD   50 mg at 05/29/19 0837    ondansetron (ZOFRAN) injection 4 mg  4 mg Intravenous Q6H PRN Jose Gama MD        0.9 % sodium chloride infusion   Intravenous Continuous Jose Gama MD 75 mL/hr at 05/29/19 0653      mupirocin (BACTROBAN) 2 % ointment   Topical Daily Jose Gama MD        insulin glargine (LANTUS) injection vial 35 Units  35 Units Subcutaneous Nightly Jose Gama MD   35 Units at 05/27/19 2225    glucose (GLUTOSE) 40 % oral gel 15 g  15 g Oral PRN Jose Gama MD        dextrose 50 % solution 12.5 g  12.5 g Intravenous PRN Jose Gama MD   12.5 g at 05/28/19 1312    glucagon (rDNA) injection 1 mg  1 mg Intramuscular PRN Jose Gama MD        dextrose 5 % solution  100 mL/hr Intravenous PRZARA Gama MD        glucose (GLUTOSE) extremities using 100 cc of nonionic contrast timed for the optimal opacification of the arterial structures per departmental CTA protocol; Advanced off-line 3-D processing was performed, with MIP and volume--rendering techniques. All CT scans at this facility use dose modulation, iterative reconstruction, and/or weight based dosing when appropriate to reduce radiation dose to as low as reasonably achievable. FINDINGS: Calcified distal abdominal aorta without stenosis, dissection or aneurysm. Multifocal stenoses versus occlusions in the bilateral internal iliac arteries. Bilateral external iliac arteries are reasonably patent. Right side: Atherosclerotic calcifications of the right common femoral artery without significant stenosis. There are atherosclerotic changes of the right deep femoral artery without significant occlusion of the larger branches. Moderate to severe stenosis or occlusion of the smaller branches. There are moderate atherosclerotic changes in the right superficial femoral artery with multifocal to moderate areas of stenosis. In the right popliteal artery, there are diffuse atherosclerotic changes with multifocal severe stenoses versus possible occlusion. Atherosclerotic changes in the right calf arteries with multifocal stenoses versus occlusions. Evaluation limited by small caliber vessels. Probable occlusion of the distal right posterior tibial artery. Left side: Diffuse atherosclerotic calcifications in the left common femoral artery without significant stenosis. Mild atherosclerotic calcifications in the deep left femoral artery without significant stenosis in the larger branches. Atherosclerotic calcifications in the left superficial femoral artery with approximately 50% stenosis in the proximal left superficial femoral artery. Mild stenosis in the distal left superficial femoral artery. Atherosclerotic changes in the left popliteal artery with mild to moderate multifocal stenoses. Atherosclerotic changes of the left calf arteries with multifocal stenosis versus occlusions. The distal left peroneal artery and posterior tibial arteries are likely occluded. Normal appendix. No visualized bowel obstruction or inflammation. Small umbilical fat hernia. Changes of previous hysterectomy noted in the pelvis. No retroperitoneal mass. No free fluid. Bones intact. EXTENSIVE MULTIFOCAL DISEASE BILATERALLY IN THE SFA, POPLITEAL AND CALF VESSELS, AS DISCUSSED ABOVE. Xr Toe Right (min 2 Views)    Result Date: 5/25/2019  EXAMINATION: RIGHT THIRD TOE, 3 VIEWS CLINICAL HISTORY: PAIN AND SWELLING IN RIGHT THIRD TOE, POSSIBLE OSTEOMYELITIS COMPARISONS: None available. FINDINGS: 3 views of the right third toe demonstrate mild osteoarthritis involving the interphalangeal joints in the right third toe. No fracture or dislocation involving the right third toe. There is no focal bone erosion or plain film evidence of osteomyelitis involving the right third toe. SOFT TISSUE SWELLING AND MILD OSTEOARTHRITIS IN THE RIGHT 3RD TOE. Mri Foot Right W Wo Contrast    Result Date: 5/26/2019  Patient: Michael Armijo  Time Out: 20:27 Exam(s): MRI RIGHT FOOT W/WO Contrast  EXAM:   MR Right Lower Extremity Without And With Intravenous Contrast, Foot  CLINICAL HISTORY:    Reason for exam: OSTEOMYELITIS SUSPECTED, FOOT SWELLING, NO ARTHROPATHY, YES ULCER, DIABETIC PT. Nayeli Chars Additional notes: Images 465  TECHNIQUE:   Multiplanar magnetic resonance images of the right foot without and with intravenous contrast.  COMPARISON:   05/25/2019 x-rays. FINDINGS:   Bones/joints:  Edema in the 3rd proximal phalanx concerning for osteomyelitis. Soft tissues:  Soft tissue edema. No discrete abscess. Edema in the 3rd proximal phalanx concerning for osteomyelitis.       Us Carotid Artery Bilateral    Result Date: 5/27/2019  EXAMINATION:  ULTRASOUND CAROTID ARTERIES CLINICAL HISTORY:   weakness, Leg weakness, Hx Carotid disease, atrial fibrillation COMPARISONS:  NONE AVAILABLE TECHNIQUE:  Grayscale, duplex Doppler. Sonographic imaging was performed by a registered sonographer and the images are submitted for interpretation. FINDINGS:  Grayscale images demonstrate calcified plaque in both distal common carotid arteries and at the carotid bifurcations. The peak systolic velocity in the right internal carotid artery is 133 cm/s with an ICA/CCA ratio of 1.7. The peak systolic velocity in the left internal carotid artery is 147 cm/s with an ICA/CCA ratio of 1.9. There is antegrade flow in both vertebral arteries. The peak systolic velocity in the right external carotid artery is 151cm/s and the peak systolic velocity in the left external carotid artery is 239cm/s. PLAQUE IN BOTH DISTAL COMMON CAROTID ARTERIES AND AT THE CAROTID BIFURCATIONS. ESTIMATED RIGHT INTERNAL CAROTID ARTERY STENOSIS IS 50-69% AND ESTIMATED LEFT INTERNAL CAROTID ARTERY STENOSIS IS 50-69%. Validated velocity measurements with angiographic measurements and velocity criteria are extrapolated from diameter data as defined by the Society of Radiologists in [address] [place] [address] Drive. Radiology 2003; 650;348-872. LABS:  Recent Labs     05/27/19  0720 05/28/19  0647 05/29/19  0559   WBC 8.3 9.7 8.7   HCT 31.8* 33.6* 32.6*    221 213    140 138   K 4.5  4.5 4.2  4.2 4.2  4.2   CO2 24 27 25   BUN 14 20 14   MG 2.1  --   --      CBC:   Recent Labs     05/27/19  0720 05/28/19  0647 05/29/19  0559   WBC 8.3 9.7 8.7   HGB 11.1* 11.5* 11.7*   HCT 31.8* 33.6* 32.6*    221 213     BMP:  Recent Labs     05/27/19  0720 05/28/19  0647 05/29/19  0559    140 138   K 4.5  4.5 4.2  4.2 4.2  4.2    103 101   CO2 24 27 25   BUN 14 20 14   CREATININE 0.60 0.89 0.80   LABGLOM >60.0 >60.0 >60.0     ABGs: No results found for: PH, PO2, PCO2  INR: No results for input(s): INR in the last 72 hours.   PRO-BNP: No results found for: PROBNP TSH: No results found for: TSH   Cardiac Injury Profile:   Recent Labs     05/27/19  0720 05/29/19  0559   TROPONINI 0.039* 0.020*      Lipid Profile: No results found for: TRIG, HDL, LDLCALC, CHOL   Hemoglobin A1C: No components found for: HGBA1C       Intake/Output Summary (Last 24 hours) at 5/29/2019 1911  Last data filed at 5/29/2019 1635  Gross per 24 hour   Intake 800 ml   Output 1600 ml   Net -800 ml        IMPRESSIONS:  1. Complex vascular disease, and significant language barrier. I find this combination to be the most striking feature that limits my ability to proceed with percutaneous intervention. 2. Patient has been having symptoms of angina pectoris for at least 2 years, maybe worse since January or December. 3. Her main issue right now is her foot pain. 4. I estimated her left ventricular ejection fraction to be about 45% with apical akinesis. RECOMMENDATIONS:  1. Ongoing medical therapy aggressive risk factor modification as you are doing. 2. If I could communicate with her adequately, I would discuss with her the possibility of multivessel PCI and stenting, the pros and cons of this approach, the need for additional imaging, the possibility of incomplete revascularization, and the likelihood of restenosis as well as for repeat procedures, and the importance of dual antiplatelet therapy. Unclear if the RCA can be revascularized or not, it is probably a chronic occlusion. 3. I think she is best served with transfer to a tertiary care center where a comprehensive team can approach her extensive vascular disease. 4. Peripheral intervention possibility has been discussed with Dr. Chris Tanner by Dr. Mariam Mclaughlin, and I will defer that decision and timing to both of them. 5. Add ranexa  6. Consider Entresto       ================================================================      Thank you for your consideration for this consultation.     SIGNATURE: Electronically signed by Eve Mae MD on 5/29/2019 at 7:11 PM

## 2019-05-29 NOTE — PROGRESS NOTES
started draining a clear fluid since last seeing Dr. Ankur Salmeron, but no redness or swelling of the foot. MRI + for OM of the proximal phalanx. No current facility-administered medications on file prior to encounter.       Current Outpatient Medications on File Prior to Encounter   Medication Sig Dispense Refill    lisinopril (PRINIVIL;ZESTRIL) 10 MG tablet Take 10 mg by mouth daily      ibuprofen (IBU) 400 MG tablet Take 1 tablet by mouth every 6 hours as needed for Pain 20 tablet 0    sertraline (ZOLOFT) 50 MG tablet Take 1 tablet by mouth daily 30 tablet 0    insulin lispro (HUMALOG) 100 UNIT/ML injection vial Inject 0-6 Units into the skin 3 times daily (with meals) 1 vial 3    insulin lispro (HUMALOG) 100 UNIT/ML injection vial Inject 5 Units into the skin 3 times daily (with meals) 1 vial 3    insulin glargine (LANTUS) 100 UNIT/ML injection vial Inject 35 Units into the skin daily 1 vial 3    metoprolol succinate (TOPROL XL) 50 MG extended release tablet Take 1 tablet by mouth daily 30 tablet 3    levothyroxine (SYNTHROID) 50 MCG tablet Take 50 mcg by mouth Daily      furosemide (LASIX) 40 MG tablet Take 40 mg by mouth 2 times daily      simvastatin (ZOCOR) 40 MG tablet Take 40 mg by mouth daily      meclizine (ANTIVERT) 25 MG tablet Take 25 mg by mouth three times daily      metFORMIN (GLUCOPHAGE) 1000 MG tablet Take 1,000 mg by mouth every 12 hours      montelukast (SINGULAIR) 10 MG tablet Take 10 mg by mouth daily      SODIUM CHLORIDE, EXTERNAL, 0.9 % SOLN Apply 1 Applicatorful topically daily 1000 mL 2    sodium polystyrene (SPS) 15 GM/60ML suspension Take 60 mLs by mouth 2 times daily for 2 doses 120 mL 0    metoprolol succinate (TOPROL XL) 25 MG extended release tablet Take 1 tablet by mouth daily 30 tablet 3     REVIEW OF SYSTEMS  See interval history    OBJECTIVE:  BP (!) 149/66   Pulse 70   Temp 98.8 °F (37.1 °C) (Oral)   Resp 18   Ht 5' 4\" (1.626 m)   Wt 197 lb 12.8 oz (89.7 kg) proximal phalanx concerning for osteomyelitis.         Arterial Duplex of the Right Leg as read by Cardiologist on 4/20/19  Impression       MODERATE PREDOMINANTLY DISTAL ATHEROSCLEROTIC DISEASE OF THE RIGHT LOWER EXTREMITY.       NO EVIDENCE OF A FLOW-LIMITING STENOSIS, ARTERIAL OCCLUSION, OR ANEURYSM IDENTIFIED.                  Hitesh James DPM PGY-2  Podiatric Surgery Resident  Pager: [phone]  May 29, 2019  8:36 AM

## 2019-05-29 NOTE — PROGRESS NOTES
CARDIOLOGY [address] PROGRESS NOTE         5/29/2019      Claudean Hall    [id number]  1955    Rounding MD: Yunier Car MD ,[facility] [place]    Primary Cardiologist:  Sophia Venegas MD, [address]    SUBJECTIVE:    Patient has No New Cardiac Complaints. Right toe still hurts. On ABX for Osteomyolitis. Has Significant CAD, Carotid Dz and PVD suggested by recent Studies as noted below. Had Cardiac Cath with PVR and Carotid anngiograms yesterday. Discussed at length with patient and  via  results again today. Will review with vascular and interventional regarding best approach for revascularization. Also discussed case with Dr. Galo Pratt, possible Amputation Friday vs medications only for now. Cardiac and general ROS otherwise negative and unchanged.       Assessment:        1. Right foot toe ulcer / Osteomyoliltis. 2. Claudication symptoms lower extremities  3. 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 4. Palpitations, with benign Holter 5/19  5. Lightheadedness  6. Bradycardia  7. Syncope, presyncope  8. CAD with Severe three-vessel coronary artery disease with 95% mid LAD, 80% distal LAD, 80% mid left circumflex, 100% right coronary ostial occlusion with distal PL/PDA collaterals from left and right systems. 9. Ischemic Cardiomyopathy, LVEF 35-45% ( Moderate Apical Hypokinesis / MI )  10. Mitral regurgitation, 2+  11. History of heart failure  12. Carotid artery disease with Bilateral internal carotid artery proximal stenoses, right 50-60%; left 70-80%. 13. Hypothyroidism  14. Hypertension  15. Hyperlipidemia  16. Diabetes  17. Depression  18. Schizophrenia  19. Family history of coronary artery disease  20. Multiple allergies  21. Ivorian Speaking only.     Plan:     1. Cardiac Supportive Care  2. Will review films regarding PVD and CAD care including possible interventions. 3. Discussed with Dr Donohue.   4. Dr Bart Weems to review films.  5. Adjust Cardiac Medications as needed and noted. 6. Further Recommendations to follow  7. See Orders           HISTORY OF PRESENT ILLNESS:      Kiki Crandall is a pleasant 66 y.o. female who presented with right tow pain and ulcer.     Patient Follows with Alla Garcia MD.     Patient History and Records, EMR reviewed. Patient Interviewed and examined. Swedish Speaking only.  utilized.     Patient was recently seen by myself in the office per the note below. She presented with multiple symptoms including claudication and right toe ulcer. She appeared stable at that time and further cardiac testing was obtained.       She's was seen by Dr. Colleen Segura podiatry for her ulcer care. She's had progression of her symptomatology. She presents now with the pain. She has no coolness to her foot. She does have discoloration of her right toe second toe.     Recent studies are as noted below including abnormal Myoview perfusion study with moderate anterior myocardial infarction and mild inferior anteroseptal ischemia suggested; echocardiogram noted normal left ventricular function with left ventricular hypertrophy (ejection fraction 50%); 48 hour Holter monitor noted sinus rhythm with only APCs and PVCs rarely. PVR S exercise study lower extremities however noted noncompressible findings but suggestion of severe bilateral lower extremity vascular disease.     Denies CP, SOB, LH, Dizziness, TIA or CVA Symptoms. No Orthopnea, Edema or CHF symptoms. No Palpitations. No Syncope. No Fever, Chills or Cold symptoms.   No GI,  or Bleeding complaints.     Cardiac and general ROS otherwise negative.     14 System ROS otherwise negative other than noted.     Past Medical History        Past Medical History:   Diagnosis Date    Asthma      Colitis      Diabetes mellitus ([facility] [address]      Hyperlipidemia      Hypertension      PVD (peripheral vascular disease) (HCC)      Thyroid disease   suspension Take 60 mLs by mouth 2 times daily for 2 doses 4/7/19 5/14/19   Mike Burkett PA-C   metoprolol succinate (TOPROL XL) 25 MG extended release tablet Take 1 tablet by mouth daily 2/25/19     [facility]                  OBJECTIVE:     MEDICATIONS:     Scheduled Meds:   sodium chloride flush  10 mL Intravenous 2 times per day    enoxaparin  40 mg Subcutaneous BID    piperacillin-tazobactam  3.375 g Intravenous Q8H    furosemide  40 mg Oral BID    levothyroxine  50 mcg Oral Daily    lisinopril  10 mg Oral Daily    meclizine  25 mg Oral TID    metoprolol succinate  50 mg Oral Daily    montelukast  10 mg Oral Daily    sertraline  50 mg Oral Daily    atorvastatin  40 mg Oral Nightly    nitroglycerin  1 inch Topical 3 times per day    mupirocin   Topical Daily    insulin glargine  35 Units Subcutaneous Nightly    insulin lispro  0-12 Units Subcutaneous TID WC    insulin lispro  0-6 Units Subcutaneous Nightly     Continuous Infusions:   sodium chloride 75 mL/hr at 05/29/19 0653    dextrose      dextrose       PRN Meds:sodium chloride flush, acetaminophen, magnesium hydroxide, ondansetron, glucose, dextrose, glucagon (rDNA), dextrose, glucose, dextrose, glucagon (rDNA), dextrose    PHYSICAL EXAM:    CURRENT VITALS: BP (!) 149/66   Pulse 70   Temp 98.8 °F (37.1 °C) (Oral)   Resp 18   Ht 5' 4\" (1.626 m)   Wt 197 lb 12.8 oz (89.7 kg)   SpO2 97%   BMI 33.95 kg/m²     CONSTITUTIONAL:  awake, alert, cooperative, no apparent distress,   ENT:  Normocephalic, without obvious abnormality, atraumatic, sinuses nontender on palpation, external ears without lesions,  NECK:  Supple, symmetrical, trachea midline, no adenopathy, thyroid symmetric, not enlarged and no tenderness, skin normal, No bruits.   LUNGS:  No increased work of breathing, good air exchange, clear to auscultation bilaterally, no crackles, no wheezing  CARDIOVASCULAR:  Normal apical impulse, regular rate and rhythm, normal S1 and S2,  2/6 Systolic murmur noted. ABDOMEN:  Obese, normal bowel sounds, soft, non-distended, non-tender, no masses palpated, no hepatosplenomegally  EXTREMETIES: mild bilateral edema, Pulses deminished Thruout. Right 3rd toe ulcer. Good cappliary refill. NEUROLOGIC:  Awake, alert, oriented to name, place and time. Following all commands and moving all extremties.   SKIN:  no bruising or bleeding, normal skin color, texture, turgor and no rashes         Data:       LABS:  Recent Results (from the past 24 hour(s))   POCT Glucose    Collection Time: 05/28/19 12:26 PM   Result Value Ref Range    POC Glucose 61 60 - 115 mg/dl    Performed on ACCU-CHEK    POCT Glucose    Collection Time: 05/28/19  1:36 PM   Result Value Ref Range    POC Glucose 107 60 - 115 mg/dl    Performed on ACCU-CHEK    POCT Glucose    Collection Time: 05/28/19  8:16 PM   Result Value Ref Range    POC Glucose 183 (H) 60 - 115 mg/dl    Performed on ACCU-CHEK    EKG 12 Lead    Collection Time: 05/29/19 12:21 AM   Result Value Ref Range    Ventricular Rate 72 BPM    Atrial Rate 72 BPM    P-R Interval 154 ms    QRS Duration 98 ms    Q-T Interval 410 ms    QTc Calculation (Bazett) 448 ms    P Axis 53 degrees    R Axis 19 degrees    T Axis -56 degrees   Basic Metabolic Panel w/ Reflex to MG    Collection Time: 05/29/19  5:59 AM   Result Value Ref Range    Sodium 138 135 - 144 mEq/L    Potassium reflex Magnesium 4.2 3.4 - 4.9 mEq/L    Chloride 101 95 - 107 mEq/L    CO2 25 20 - 31 mEq/L    Anion Gap 12 9 - 15 mEq/L    Glucose 131 (H) 70 - 99 mg/dL    BUN 14 8 - 23 mg/dL    CREATININE 0.80 0.50 - 0.90 mg/dL    GFR Non-African American >60.0 >60    GFR  >60.0 >60    Calcium 9.1 8.5 - 9.9 mg/dL   Lactic acid, plasma    Collection Time: 05/29/19  5:59 AM   Result Value Ref Range    Lactic Acid 0.7 0.5 - 2.2 mmol/L   CBC    Collection Time: 05/29/19  5:59 AM   Result Value Ref Range    WBC 8.7 4.8 - 10.8 K/uL    RBC 4.54 4.20 - 5.40 M/uL    Hemoglobin 11.7 (L) 12.0 - 16.0 g/dL    Hematocrit 32.6 (L) 37.0 - 47.0 %    MCV 71.8 (L) 82.0 - 100.0 fL    MCH 25.8 (L) 27.0 - 31.3 pg    MCHC 35.9 33.0 - 37.0 %    RDW 16.2 (H) 11.5 - 14.5 %    Platelets 599 880 - 739 K/uL   Comprehensive Metabolic Panel    Collection Time: 05/29/19  5:59 AM   Result Value Ref Range    Potassium 4.2 3.4 - 4.9 mEq/L    Total Protein 6.4 6.3 - 8.0 g/dL    Alb 3.1 (L) 3.5 - 4.6 g/dL    Total Bilirubin 0.3 0.2 - 0.7 mg/dL    Alkaline Phosphatase 77 40 - 130 U/L    ALT 10 0 - 33 U/L    AST 13 0 - 35 U/L    Globulin 3.3 2.3 - 3.5 g/dL   Troponin    Collection Time: 05/29/19  5:59 AM   Result Value Ref Range    Troponin 0.020 (HH) 0.000 - 0.010 ng/mL   Sedimentation Rate    Collection Time: 05/29/19  5:59 AM   Result Value Ref Range    Sed Rate 49 (H) 0 - 30 mm   POCT Glucose    Collection Time: 05/29/19  6:59 AM   Result Value Ref Range    POC Glucose 141 (H) 60 - 115 mg/dl    Performed on ACCU-CHEK      Carotid US:  Bilateral ICA 50-69%     ECG:                 SR.  Ant MI, old  NSSTS o/w.     HOLTER MONITOR:  5/14/19  Sinus Rhtyhm, Avg 70,  Min Max . Rare APCs and PVCs. No PSVT or VT. ECHO:  5/16/19                          Moderate Apical LV Hypokinesis, LVEF 50%. Moderate CLVH with Diastolic Heart Disease Noted. MAC with MOderate MR. Moderate TR.        LEXISCAN MYOCARDIAL PERFUSION STUDY  PERFORMED: 05/14/2019  1. Abnormal Lexiscan Myoview cardiac perfusion stress test.  2. Moderate distal anterior myocardial infarction with a mild degree of ijeoma-infarct ischemia. 3. Severe apical myocardial infarction. 4. Mild inferior anteroseptal myocardial ischemia. 5. Normal LV systolic function with calculated left ventricular ejection fraction 54% and apical hypokinesis as described above. 6. Comparison: No previous study is available for comparison. 7. COMMENTS:  8.  High risk myocardial perfusion study based on multiple perfusion abnormalities. 9. Clinical correlation is advised.     PVR Rest / EX LEGS:  5/16/19  Severe bilateral lower extremity atherosclerotic disease, possibly worse on the left reduced specificity and sensitivity is noted due to noncompressibility of the high thigh and low thigh cuffs. Toe pressures also indicate significant disease, and even some poor wound healing. JIMI; Right . 44, Left .32. Everett Carrillo CTA ABD and LL RO:  Preliminary Reviewed. Diffuse PVD with Bilateral Trifurction PVOD. Full Report to follow. CATH With CAROTIDS AND PVR:    Hemodynamics:    /30, /60.    There was no aortic valve gradient noted on pullback pressure across the aortic valve.     Left ventriculography:    MOYA left ventriculography revealed Severe Apical Diskinesis with otherwise preserve LV Function.   Left ventricular ejection fraction overall was 35-45%.    There was 2+ mitral regurgitation.     Coronary cineangiography:       Left Main:    Normal.     Left anterior descending:    Large-caliber vessel with proximal and mid calcification. Mid LAD after first diagonal with 85-95% stenosis; mid distal LAD after second diagonal with 80% stenosis. Distal LAD wraparound vessel. Diagonal 1 and diagonal 2 small caliber vessels with moderate disease.     Left circumflex:    Moderately large vessel with mid left circumflex 80% focal stenosis. Large second obtuse marginal branch without disease.   Obtuse marginal 3 small vessel with diffuse 80% disease.     Right coronary artery:    Large dominant vessel with 100% ostial occlusion with right to right and left to right collaterals filling the distal vessel.    Posterolateral and PDA were visualized vessels by collaterals from the left system.        There were no fixed or reversible stenoses identified otherwise.     Abdominal aortography, digital subtraction including bilateral lower extremity extremity selective angiography runoff:     SMA and celiac artery visualized without significant disease. Renal arteries with normal right renal artery, 40% proximal left renal artery. Distal abdominal aorta without AAA and with mild PVD 20% luminal irregularities. Aortic bifurcation and bilateral iliac arteries were free of significant.     Right lower extremity selective angiography:   Right SFA proximal mid free of significant disease with distal long 50-60% disease. Right popliteal 100% mid occlusion. Right trifurcation disease with diffuse 3 vessel disease involving 100% posterior tibial and anterior tibial and bone severely diseased peroneal vessel with significant collaterals.     Left lower extremity selective angiography:  Left SFA with long distal SFA 60-70% stenosis only. Left popliteal mid distal diffuse 95% disease. Left trifurcation disease with diffuse three-vessel disease including three-vessel occlusion with significant collateral flow reconstitution distally.     Carotid selective arteriography bilateral:.     Right internal carotid artery 50-60% proximal, calcified, ulcerative. Left internal carotid artery 70-80% proximal, calcified, ulcerative. Cath Impression:    1.  Abnormal cardiac catheterization. 2   Severe three-vessel coronary artery disease with 95% mid LAD, 80% distal LAD, 80% mid left circumflex, 100% right coronary ostial occlusion with distal PL/PDA collaterals from left and right systems. 3.  Ischemic cardiomyopathy with moderate sized apical dyskinesia, LVEF 35-40%. 4.  Moderate mitral regurgitation  5. Left renal artery 40% proximal stenosis   6. Severe peripheral vascular disease with bilateral distal SFA and bilateral three-vessel below the knee trifurcation disease as noted above. 7.  Bilateral internal carotid artery proximal stenoses, right 50-60%; left 70-80%.             Jose Gama MD ,[facility]

## 2019-05-29 NOTE — PROGRESS NOTES
Hospitalist Progress Note      PCP: Juliann Ken    Date of Admission: 5/25/2019    Chief Complaint:    Chief Complaint   Patient presents with    Toe Pain     right 4th toe pain/numbness x 3 months. States toe is black     Subjective:  Patient denies fevers, chills, sweats, CP, SOB. 12 point ROS negative other than mentioned above     Medications:  Reviewed    Infusion Medications    sodium chloride 75 mL/hr at 05/29/19 0407    dextrose      dextrose       Scheduled Medications    atorvastatin  80 mg Oral Nightly    metoprolol succinate  50 mg Oral BID    isosorbide mononitrate  60 mg Oral Daily    cilostazol  50 mg Oral BID    sodium chloride flush  10 mL Intravenous 2 times per day    enoxaparin  40 mg Subcutaneous BID    piperacillin-tazobactam  3.375 g Intravenous Q8H    furosemide  40 mg Oral BID    levothyroxine  50 mcg Oral Daily    lisinopril  10 mg Oral Daily    meclizine  25 mg Oral TID    montelukast  10 mg Oral Daily    sertraline  50 mg Oral Daily    mupirocin   Topical Daily    insulin glargine  35 Units Subcutaneous Nightly    insulin lispro  0-12 Units Subcutaneous TID WC    insulin lispro  0-6 Units Subcutaneous Nightly     PRN Meds: sodium chloride flush, acetaminophen, magnesium hydroxide, ondansetron, glucose, dextrose, glucagon (rDNA), dextrose, glucose, dextrose, glucagon (rDNA), dextrose      Intake/Output Summary (Last 24 hours) at 5/29/2019 1309  Last data filed at 5/29/2019 0657  Gross per 24 hour   Intake --   Output 1600 ml   Net -1600 ml     Exam:    BP (!) 143/56   Pulse 84   Temp 98.1 °F (36.7 °C) (Oral)   Resp 18   Ht 5' 4\" (1.626 m)   Wt 197 lb 12.8 oz (89.7 kg)   SpO2 96%   BMI 33.95 kg/m²     General appearance: No apparent distress, appears stated age and cooperative. HEENT:  Conjunctivae/corneas clear. Neck:  Trachea midline. Respiratory:  Normal respiratory effort.  Clear to auscultation  Cardiovascular: Regular rate and rhythm   Abdomen: Soft, non-tender, non-distended with normal bowel sounds. Musculoskeletal: No clubbing, cyanosis or edema bilaterally. 3rd right toe blueish  Neuro: Non Focal.    Labs:   Recent Labs     05/27/19  0720 05/28/19  0647 05/29/19  0559   WBC 8.3 9.7 8.7   HGB 11.1* 11.5* 11.7*   HCT 31.8* 33.6* 32.6*    221 213     Recent Labs     05/27/19  0720 05/28/19  0647 05/29/19  0559    140 138   K 4.5  4.5 4.2  4.2 4.2  4.2    103 101   CO2 24 27 25   BUN 14 20 14   CREATININE 0.60 0.89 0.80   CALCIUM 8.9 9.0 9.1     Recent Labs     05/27/19  0720 05/28/19  0647 05/29/19  0559   AST 13 15 13   ALT 10 12 10   BILITOT 0.3 0.3 0.3   ALKPHOS 66 71 77     No results for input(s): INR in the last 72 hours. Recent Labs     05/27/19  0720 05/29/19  0559   TROPONINI 0.039* 0.020*     Urinalysis:      Lab Results   Component Value Date    NITRU Negative 02/21/2019    BLOODU Negative 02/21/2019    SPECGRAV 1.018 02/21/2019    GLUCOSEU 250 02/21/2019     Radiology:  CTA ABDOMINAL AORTA W BILAT RUNOFF W WO CONTRAST   Final Result      EXTENSIVE MULTIFOCAL DISEASE BILATERALLY IN THE SFA, POPLITEAL AND CALF VESSELS, AS DISCUSSED ABOVE.      US CAROTID ARTERY BILATERAL   Final Result   PLAQUE IN BOTH DISTAL COMMON CAROTID ARTERIES AND AT THE CAROTID BIFURCATIONS. ESTIMATED RIGHT INTERNAL CAROTID ARTERY STENOSIS IS 50-69% AND ESTIMATED LEFT INTERNAL CAROTID ARTERY STENOSIS IS 50-69%. Validated velocity measurements with angiographic measurements and velocity criteria are extrapolated from diameter data as defined by the Society of Radiologists in [facility] [address]. Radiology 2003; [id number];[phone]. MRI FOOT RIGHT W WO CONTRAST   Final Result     Edema in the 3rd proximal phalanx concerning for osteomyelitis. XR TOE RIGHT (MIN 2 VIEWS)   Final Result   SOFT TISSUE SWELLING AND MILD OSTEOARTHRITIS IN THE RIGHT 3RD TOE.         Assessment/Plan:    #PAD with Osteomyelitis of toe     - ID managing Abx; continue zosyn; cardiology plan on reviewing films for possible intervention    #Triple Vessel CAD      - Cardiology is going to see if they can intervene vs CABG; they are managing this issue    #HTN/HLD/Hypothyroidism     - Continue home meds    #DMII     - SSI    Active Hospital Problems    Diagnosis Date Noted    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 [A49.8]     Skin infection [L08.9] 05/25/2019    Acute osteomyelitis ([address] [M86.10] 05/14/2019     Additional work up or/and treatment plan may be added today or then after based on clinical progression. I am managing a portion of pt care. Some medical issues are handled by other specialists. Additional work up and treatment should be done in out pt setting by pt PCP and other out pt providers. In addition to examining and evaluating pt, I spent additional time explaining care, normal and abnormal findings, and treatment plan. All of pt questions were answered. Counseling, diet and education were  provided. Case will be discussed with nursing staff when appropriate. Family will be updated if and when appropriate.       Diet: DIET CARDIAC;    Code Status: Full Code    PT/OT Eval     Electronically signed by Lasha Beckham MD on 5/29/2019 at 1:09 PM

## 2019-05-29 NOTE — PROGRESS NOTES
Physical Therapy Med Surg Daily Treatment Note  Facility/Department: [facility]  Room: [facility]       NAME: Latoya Bell  : 1955 (89 y.o.)  MRN: [id number]  CODE STATUS: Full Code    Date of Service: 2019    Patient Diagnosis(es): Skin infection [L08.9]   Chief Complaint   Patient presents with    Toe Pain     right 4th toe pain/numbness x 3 months. States toe is black     Patient Active Problem List    Diagnosis Date Noted    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     Skin infection 2019    Acute osteomyelitis ([address] 2019    Atherosclerotic PVD with intermittent claudication ([address] 2019    Non-pressure ulcer of toe ([address] 2019    Type 2 diabetes mellitus with hyperglycemia, with long-term current use of insulin ([address] 2019    HTN (hypertension) 2019    HLD (hyperlipidemia) 2019    Hypothyroid 2019    HF (heart failure) ([address] 2019    Severe major depression with psychotic features ([address] 2019        Past Medical History:   Diagnosis Date    Asthma     Colitis     Diabetes mellitus ([address]     Hyperlipidemia     Hypertension     PVD (peripheral vascular disease) ([address]     Thyroid disease      Past Surgical History:   Procedure Laterality Date     SECTION      HYSTERECTOMY            Restrictions:       SUBJECTIVE:  General  Chart Reviewed: Yes  Subjective  Subjective: Pt primarily Khmer speaking utilized interpretor phone to explain what the treatment was going to consist of and get her consent #[id number]. Pt agreeable to treatment.      Pre Pain Assessment:  Pre Treatment Pain Screening  Pain at present: 0  Scale Used: Numeric Score  Intervention List: Patient able to continue with treatment  Pain Screening  Patient Currently in Pain: Yes       Post Pain Assessment:   Pain Assessment  Pain Assessment: 0-10  Pain Level: 9  Pain Type: Acute pain  Pain Location: Foot  Pain Orientation: Right Therapy Time   Individual   Time In 1000   Time Out 1017   Minutes 17      Gait: 13  BM/TrsF: 4        Robyn Butler PTA, 05/29/19 at 10:21 AM

## 2019-05-30 LAB
ALBUMIN SERPL-MCNC: 3.1 G/DL (ref 3.5–4.6)
ALP BLD-CCNC: 71 U/L (ref 40–130)
ALT SERPL-CCNC: 9 U/L (ref 0–33)
ANION GAP SERPL CALCULATED.3IONS-SCNC: 11 MEQ/L (ref 9–15)
AST SERPL-CCNC: 12 U/L (ref 0–35)
BILIRUB SERPL-MCNC: 0.3 MG/DL (ref 0.2–0.7)
BLOOD CULTURE, ROUTINE: NORMAL
BUN BLDV-MCNC: 18 MG/DL (ref 8–23)
CALCIUM SERPL-MCNC: 8.7 MG/DL (ref 8.5–9.9)
CHLORIDE BLD-SCNC: 103 MEQ/L (ref 95–107)
CO2: 28 MEQ/L (ref 20–31)
CREAT SERPL-MCNC: 1.13 MG/DL (ref 0.5–0.9)
CULTURE, BLOOD 2: NORMAL
GFR AFRICAN AMERICAN: 59.1
GFR NON-AFRICAN AMERICAN: 48.9
GLOBULIN: 3.2 G/DL (ref 2.3–3.5)
GLUCOSE BLD-MCNC: 106 MG/DL (ref 70–99)
GLUCOSE BLD-MCNC: 111 MG/DL (ref 60–115)
GLUCOSE BLD-MCNC: 136 MG/DL (ref 60–115)
GLUCOSE BLD-MCNC: 198 MG/DL (ref 60–115)
GLUCOSE BLD-MCNC: 234 MG/DL (ref 60–115)
HCT VFR BLD CALC: 31.7 % (ref 37–47)
HEMOGLOBIN: 11.1 G/DL (ref 12–16)
LACTIC ACID: 0.6 MMOL/L (ref 0.5–2.2)
MCH RBC QN AUTO: 25.5 PG (ref 27–31.3)
MCHC RBC AUTO-ENTMCNC: 35.1 % (ref 33–37)
MCV RBC AUTO: 72.7 FL (ref 82–100)
PDW BLD-RTO: 16 % (ref 11.5–14.5)
PERFORMED ON: ABNORMAL
PERFORMED ON: NORMAL
PLATELET # BLD: 203 K/UL (ref 130–400)
POTASSIUM REFLEX MAGNESIUM: 4.2 MEQ/L (ref 3.4–4.9)
POTASSIUM SERPL-SCNC: 4.2 MEQ/L (ref 3.4–4.9)
RBC # BLD: 4.35 M/UL (ref 4.2–5.4)
SODIUM BLD-SCNC: 142 MEQ/L (ref 135–144)
TOTAL PROTEIN: 6.3 G/DL (ref 6.3–8)
TROPONIN: 0.02 NG/ML (ref 0–0.01)
WBC # BLD: 9.5 K/UL (ref 4.8–10.8)

## 2019-05-30 PROCEDURE — 84484 ASSAY OF TROPONIN QUANT: CPT

## 2019-05-30 PROCEDURE — 2060000000 HC ICU INTERMEDIATE R&B

## 2019-05-30 PROCEDURE — 83605 ASSAY OF LACTIC ACID: CPT

## 2019-05-30 PROCEDURE — 80053 COMPREHEN METABOLIC PANEL: CPT

## 2019-05-30 PROCEDURE — 36415 COLL VENOUS BLD VENIPUNCTURE: CPT

## 2019-05-30 PROCEDURE — 93005 ELECTROCARDIOGRAM TRACING: CPT | Performed by: INTERNAL MEDICINE

## 2019-05-30 PROCEDURE — 2580000003 HC RX 258: Performed by: INTERNAL MEDICINE

## 2019-05-30 PROCEDURE — 99232 SBSQ HOSP IP/OBS MODERATE 35: CPT | Performed by: INTERNAL MEDICINE

## 2019-05-30 PROCEDURE — 85027 COMPLETE CBC AUTOMATED: CPT

## 2019-05-30 PROCEDURE — 6360000002 HC RX W HCPCS: Performed by: INTERNAL MEDICINE

## 2019-05-30 PROCEDURE — 97116 GAIT TRAINING THERAPY: CPT

## 2019-05-30 PROCEDURE — 6370000000 HC RX 637 (ALT 250 FOR IP): Performed by: INTERNAL MEDICINE

## 2019-05-30 RX ADMIN — CILOSTAZOL 50 MG: 100 TABLET ORAL at 22:04

## 2019-05-30 RX ADMIN — MECLIZINE HYDROCHLORIDE 25 MG: 25 TABLET ORAL at 22:04

## 2019-05-30 RX ADMIN — METOPROLOL SUCCINATE 50 MG: 50 TABLET, EXTENDED RELEASE ORAL at 07:59

## 2019-05-30 RX ADMIN — ENOXAPARIN SODIUM 40 MG: 40 INJECTION SUBCUTANEOUS at 08:00

## 2019-05-30 RX ADMIN — METOPROLOL SUCCINATE 50 MG: 50 TABLET, EXTENDED RELEASE ORAL at 22:04

## 2019-05-30 RX ADMIN — ATORVASTATIN CALCIUM 80 MG: 80 TABLET, FILM COATED ORAL at 22:04

## 2019-05-30 RX ADMIN — MECLIZINE HYDROCHLORIDE 25 MG: 25 TABLET ORAL at 07:59

## 2019-05-30 RX ADMIN — SODIUM CHLORIDE: 9 INJECTION, SOLUTION INTRAVENOUS at 02:58

## 2019-05-30 RX ADMIN — SERTRALINE HYDROCHLORIDE 50 MG: 50 TABLET ORAL at 07:59

## 2019-05-30 RX ADMIN — ISOSORBIDE MONONITRATE 60 MG: 60 TABLET, EXTENDED RELEASE ORAL at 07:59

## 2019-05-30 RX ADMIN — PIPERACILLIN AND TAZOBACTAM 3.38 G: 3; .375 INJECTION, POWDER, FOR SOLUTION INTRAVENOUS at 12:14

## 2019-05-30 RX ADMIN — MONTELUKAST 10 MG: 10 TABLET, FILM COATED ORAL at 07:59

## 2019-05-30 RX ADMIN — LISINOPRIL 10 MG: 10 TABLET ORAL at 08:00

## 2019-05-30 RX ADMIN — INSULIN LISPRO 4 UNITS: 100 INJECTION, SOLUTION INTRAVENOUS; SUBCUTANEOUS at 12:14

## 2019-05-30 RX ADMIN — PIPERACILLIN AND TAZOBACTAM 3.38 G: 3; .375 INJECTION, POWDER, FOR SOLUTION INTRAVENOUS at 22:06

## 2019-05-30 RX ADMIN — PIPERACILLIN AND TAZOBACTAM 3.38 G: 3; .375 INJECTION, POWDER, FOR SOLUTION INTRAVENOUS at 06:07

## 2019-05-30 RX ADMIN — LEVOTHYROXINE SODIUM 50 MCG: 50 TABLET ORAL at 06:07

## 2019-05-30 RX ADMIN — FUROSEMIDE 40 MG: 40 TABLET ORAL at 15:59

## 2019-05-30 RX ADMIN — MECLIZINE HYDROCHLORIDE 25 MG: 25 TABLET ORAL at 12:55

## 2019-05-30 RX ADMIN — INSULIN GLARGINE 35 UNITS: 100 INJECTION, SOLUTION SUBCUTANEOUS at 22:07

## 2019-05-30 RX ADMIN — ENOXAPARIN SODIUM 40 MG: 40 INJECTION SUBCUTANEOUS at 22:06

## 2019-05-30 RX ADMIN — CILOSTAZOL 50 MG: 100 TABLET ORAL at 08:00

## 2019-05-30 RX ADMIN — FUROSEMIDE 40 MG: 40 TABLET ORAL at 07:59

## 2019-05-30 ASSESSMENT — PAIN SCALES - GENERAL
PAINLEVEL_OUTOF10: 0

## 2019-05-30 NOTE — DISCHARGE SUMMARY
CONSULT TO INFECTIOUS DISEASES  PHARMACY TO DOSE VANCOMYCIN  IP CONSULT TO PODIATRY  IP CONSULT TO CARDIOLOGY  IP CONSULT TO CARDIOLOGY    Significant Diagnostic Studies:    Cta Abdominal Aorta W Bilat Runoff W Wo Contrast    Result Date: 5/28/2019  EXAMINATION: CTA ABDOMINAL AORTA W BILAT RUNOFF W WO CONTRAST: DATE AND TIME: 5/26/2019 at 1:45 PM. CLINICAL HISTORY: CLAUDICATION. ABDOMINAL CTA WITH BILATERAL RUNOFF. LEGS ANGIOGRAMS. COMPARISON: None. TECHNIQUE: Contrast enhanced thin section helical images were obtained through the lower extremities using 100 cc of nonionic contrast timed for the optimal opacification of the arterial structures per departmental CTA protocol; Advanced off-line 3-D processing was performed, with MIP and volume--rendering techniques. All CT scans at this facility use dose modulation, iterative reconstruction, and/or weight based dosing when appropriate to reduce radiation dose to as low as reasonably achievable. FINDINGS: Calcified distal abdominal aorta without stenosis, dissection or aneurysm. Multifocal stenoses versus occlusions in the bilateral internal iliac arteries. Bilateral external iliac arteries are reasonably patent. Right side: Atherosclerotic calcifications of the right common femoral artery without significant stenosis. There are atherosclerotic changes of the right deep femoral artery without significant occlusion of the larger branches. Moderate to severe stenosis or occlusion of the smaller branches. There are moderate atherosclerotic changes in the right superficial femoral artery with multifocal to moderate areas of stenosis. In the right popliteal artery, there are diffuse atherosclerotic changes with multifocal severe stenoses versus possible occlusion. Atherosclerotic changes in the right calf arteries with multifocal stenoses versus occlusions. Evaluation limited by small caliber vessels. Probable occlusion of the distal right posterior tibial artery.  Left side: Diffuse atherosclerotic calcifications in the left common femoral artery without significant stenosis. Mild atherosclerotic calcifications in the deep left femoral artery without significant stenosis in the larger branches. Atherosclerotic calcifications in the left superficial femoral artery with approximately 50% stenosis in the proximal left superficial femoral artery. Mild stenosis in the distal left superficial femoral artery. Atherosclerotic changes in the left popliteal artery with mild to moderate multifocal stenoses. Atherosclerotic changes of the left calf arteries with multifocal stenosis versus occlusions. The distal left peroneal artery and posterior tibial arteries are likely occluded. Normal appendix. No visualized bowel obstruction or inflammation. Small umbilical fat hernia. Changes of previous hysterectomy noted in the pelvis. No retroperitoneal mass. No free fluid. Bones intact. EXTENSIVE MULTIFOCAL DISEASE BILATERALLY IN THE SFA, POPLITEAL AND CALF VESSELS, AS DISCUSSED ABOVE. Xr Toe Right (min 2 Views)    Result Date: 5/25/2019  EXAMINATION: RIGHT THIRD TOE, 3 VIEWS CLINICAL HISTORY: PAIN AND SWELLING IN RIGHT THIRD TOE, POSSIBLE OSTEOMYELITIS COMPARISONS: None available. FINDINGS: 3 views of the right third toe demonstrate mild osteoarthritis involving the interphalangeal joints in the right third toe. No fracture or dislocation involving the right third toe. There is no focal bone erosion or plain film evidence of osteomyelitis involving the right third toe. SOFT TISSUE SWELLING AND MILD OSTEOARTHRITIS IN THE RIGHT 3RD TOE. Mri Foot Right W Wo Contrast    Result Date: 5/26/2019  Patient: Alan Hartley  Time Out: 20:27 Exam(s): MRI RIGHT FOOT W/WO Contrast  EXAM:   MR Right Lower Extremity Without And With Intravenous Contrast, Foot  CLINICAL HISTORY:    Reason for exam: OSTEOMYELITIS SUSPECTED, FOOT SWELLING, NO ARTHROPATHY, YES ULCER, DIABETIC PT. Dottie Colmenares  Additional notes: Images 465  TECHNIQUE:   Multiplanar magnetic resonance images of the right foot without and with intravenous contrast.  COMPARISON:   05/25/2019 x-rays. FINDINGS:   Bones/joints:  Edema in the 3rd proximal phalanx concerning for osteomyelitis. Soft tissues:  Soft tissue edema. No discrete abscess. Edema in the 3rd proximal phalanx concerning for osteomyelitis. Us Carotid Artery Bilateral    Result Date: 5/27/2019  EXAMINATION:  ULTRASOUND CAROTID ARTERIES CLINICAL HISTORY:   weakness, Leg weakness, Hx Carotid disease, atrial fibrillation COMPARISONS:  NONE AVAILABLE TECHNIQUE:  Grayscale, duplex Doppler. Sonographic imaging was performed by a registered sonographer and the images are submitted for interpretation. FINDINGS:  Grayscale images demonstrate calcified plaque in both distal common carotid arteries and at the carotid bifurcations. The peak systolic velocity in the right internal carotid artery is 133 cm/s with an ICA/CCA ratio of 1.7. The peak systolic velocity in the left internal carotid artery is 147 cm/s with an ICA/CCA ratio of 1.9. There is antegrade flow in both vertebral arteries. The peak systolic velocity in the right external carotid artery is 151cm/s and the peak systolic velocity in the left external carotid artery is 239cm/s. PLAQUE IN BOTH DISTAL COMMON CAROTID ARTERIES AND AT THE CAROTID BIFURCATIONS. ESTIMATED RIGHT INTERNAL CAROTID ARTERY STENOSIS IS 50-69% AND ESTIMATED LEFT INTERNAL CAROTID ARTERY STENOSIS IS 50-69%. Validated velocity measurements with angiographic measurements and velocity criteria are extrapolated from diameter data as defined by the Society of Radiologists in [address] [place] [address] [address]. Radiology 2003; 394;353-137.       Discharge Medications:       Robby Narayanan [facility] Medication Instructions L:[id number]    Printed on:05/30/19 1412   Medication Information                      furosemide (LASIX) 40 MG tablet  Take 40 mg by mouth 2 times daily             ibuprofen (IBU) 400 MG tablet  Take 1 tablet by mouth every 6 hours as needed for Pain             insulin glargine (LANTUS) 100 UNIT/ML injection vial  Inject 35 Units into the skin daily             insulin lispro (HUMALOG) 100 UNIT/ML injection vial  Inject 0-6 Units into the skin 3 times daily (with meals)             insulin lispro (HUMALOG) 100 UNIT/ML injection vial  Inject 5 Units into the skin 3 times daily (with meals)             levothyroxine (SYNTHROID) 50 MCG tablet  Take 50 mcg by mouth Daily             lisinopril (PRINIVIL;ZESTRIL) 10 MG tablet  Take 10 mg by mouth daily             meclizine (ANTIVERT) 25 MG tablet  Take 25 mg by mouth three times daily             metFORMIN (GLUCOPHAGE) 1000 MG tablet  Take 1,000 mg by mouth every 12 hours             metoprolol succinate (TOPROL XL) 25 MG extended release tablet  Take 1 tablet by mouth daily             metoprolol succinate (TOPROL XL) 50 MG extended release tablet  Take 1 tablet by mouth daily             montelukast (SINGULAIR) 10 MG tablet  Take 10 mg by mouth daily             sertraline (ZOLOFT) 50 MG tablet  Take 1 tablet by mouth daily             simvastatin (ZOCOR) 40 MG tablet  Take 40 mg by mouth daily             SODIUM CHLORIDE, EXTERNAL, 0.9 % SOLN  Apply 1 Applicatorful topically daily             sodium polystyrene (SPS) 15 GM/60ML suspension  Take 60 mLs by mouth 2 times daily for 2 doses                 Disposition:   If discharged to Home, Any [place] [id number] needs that were indicated and/or required as been addressed and set up by Social Work. Condition at discharge: Pt was medically stable at the time of discharge. Activity: Bedrest    Total time taken for discharging this patient: 40 minutes. Greater than 70% of time was spent focused exclusively on this patient.  Time was taken to review chart, discuss plans with consultants, reconciling medications, discussing plan answering questions with bonita Brandtradha Darnell  5/30/2019, 2:12 PM  ----------------------------------------------------------------------------------------------------------------------    Tejas Ramos,

## 2019-05-30 NOTE — PROGRESS NOTES
CARDIOLOGY [facility] PROGRESS NOTE         5/30/2019      Amber Rogers    [id number]  1955    Rounding MD: Martin Banuelos MD ,[facility]    Primary Cardiologist:  Aurea Islas MD, [facility]    SUBJECTIVE:    Patient has No New Cardiac Complaints. Right toe still hurts. On ABX for Osteomyolitis. Has Significant CAD, Carotid Dz and PVD suggested by recent Studies as noted below. Had Cardiac Cath with PVR and Carotid anngiograms yesterday. Discussed at length with patient and  via  results again today. Have reviewed with vascular and interventional regarding best approach for revascularization. Decision for surgery center evaluation and care was decided upon by primary service. [facility] has accepted. We'll defer further care to them. Also discussed case with Dr. Naomi Cedeno. Cardiac and general ROS otherwise negative and unchanged.       Assessment:        1. Right foot toe ulcer / Osteomyoliltis. 2. Claudication symptoms lower extremities  3. 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 4. Palpitations, with benign Holter 5/19  5. Lightheadedness  6. Bradycardia  7. Syncope, presyncope  8. CAD with Severe three-vessel coronary artery disease with 95% mid LAD, 80% distal LAD, 80% mid left circumflex, 100% right coronary ostial occlusion with distal PL/PDA collaterals from left and right systems. 9. Ischemic Cardiomyopathy, LVEF 35-45% ( Moderate Apical Hypokinesis / MI )  10. Mitral regurgitation, 2+  11. History of heart failure  12. Carotid artery disease with Bilateral internal carotid artery proximal stenoses, right 50-60%; left 70-80%. 13. Hypothyroidism  14. Hypertension  15. Hyperlipidemia  16. Diabetes  17. Depression  18. Schizophrenia  19. Family history of coronary artery disease  20. Multiple allergies  21. [place] Speaking only.     Plan:     1. Cardiac Supportive Care  2.  Discussed with primary care Dr Priyanka Austin, he wishes to transfer patient to [facility] tertiary care center for further care. 3. Dr Rossy Henderson and Dr Aleksandr Montes aware. 4. Will defer further treatment to [facility] at this point. Cardiovascular standpoint. 5. Continue current medications. 6. Discussed with patient via translation phone and she is aware and agrees. 7. See Orders        HISTORY OF PRESENT ILLNESS:      Freddy Paulino is a pleasant 66 y.o. female who presented with right tow pain and ulcer.     Patient Follows with Jonh Linda MD.     Patient History and Records, EMR reviewed. Patient Interviewed and examined. Divehi Speaking only.  utilized.     Patient was recently seen by myself in the office per the note below. She presented with multiple symptoms including claudication and right toe ulcer. She appeared stable at that time and further cardiac testing was obtained.       She's was seen by Dr. Rossy Henderson podiatry for her ulcer care. She's had progression of her symptomatology. She presents now with the pain. She has no coolness to her foot. She does have discoloration of her right toe second toe.     Recent studies are as noted below including abnormal Myoview perfusion study with moderate anterior myocardial infarction and mild inferior anteroseptal ischemia suggested; echocardiogram noted normal left ventricular function with left ventricular hypertrophy (ejection fraction 50%); 48 hour Holter monitor noted sinus rhythm with only APCs and PVCs rarely. PVR S exercise study lower extremities however noted noncompressible findings but suggestion of severe bilateral lower extremity vascular disease.     Denies CP, SOB, LH, Dizziness, TIA or CVA Symptoms. No Orthopnea, Edema or CHF symptoms. No Palpitations. No Syncope. No Fever, Chills or Cold symptoms.   No GI,  or Bleeding complaints.     Cardiac and general ROS otherwise negative.     14 System ROS otherwise negative other than noted.     Past Medical History        Past Medical History:   Diagnosis Date    Asthma      Colitis      Diabetes mellitus [facility] [address]      Hyperlipidemia      Hypertension      PVD (peripheral vascular disease) ([facility])      Thyroid disease              Past Surgical History         Past Surgical History:   Procedure Laterality Date     SECTION        HYSTERECTOMY                Home Medications           Prior to Admission medications    Medication Sig Start Date End Date Taking?  Authorizing Provider   lisinopril (PRINIVIL;ZESTRIL) 10 MG tablet Take 10 mg by mouth daily     Yes Historical Provider, MD   ibuprofen (IBU) 400 MG tablet Take 1 tablet by mouth every 6 hours as needed for Pain 19   Yes Alexei Putnam PA-C   sertraline (ZOLOFT) 50 MG tablet Take 1 tablet by mouth daily 3/1/19   Yes ESDRAS Wheeler CNP   insulin lispro (HUMALOG) 100 UNIT/ML injection vial Inject 0-6 Units into the skin 3 times daily (with meals) 19   Yes ESDRAS Tinoco   insulin lispro (HUMALOG) 100 UNIT/ML injection vial Inject 5 Units into the skin 3 times daily (with meals) 19   Yes ESDRAS Tinoco   insulin glargine (LANTUS) 100 UNIT/ML injection vial Inject 35 Units into the skin daily 19   Yes ESDRAS Tinoco   metoprolol succinate (TOPROL XL) 50 MG extended release tablet Take 1 tablet by mouth daily 19   Yes ESDRAS Castorena   levothyroxine (SYNTHROID) 50 MCG tablet Take 50 mcg by mouth Daily     Yes Historical Provider, MD   furosemide (LASIX) 40 MG tablet Take 40 mg by mouth 2 times daily     Yes Historical Provider, MD   simvastatin (ZOCOR) 40 MG tablet Take 40 mg by mouth daily     Yes Historical Provider, MD   meclizine (ANTIVERT) 25 MG tablet Take 25 mg by mouth three times daily     Yes Historical Provider, MD   metFORMIN (GLUCOPHAGE) 1000 MG tablet Take 1,000 mg by mouth every 12 hours     Yes Historical Provider, MD   montelukast (SINGULAIR) 10 MG tablet Take 10 mg by mouth daily     Yes Historical Provider, MD   SODIUM CHLORIDE, EXTERNAL, 0.9 % SOLN Apply 1 Applicatorful topically daily 4/23/19     Rhett Estes DPM   sodium polystyrene (SPS) 15 GM/60ML suspension Take 60 mLs by mouth 2 times daily for 2 doses 4/7/19 5/14/19   Evin Hamilton PA-C   metoprolol succinate (TOPROL XL) 25 MG extended release tablet Take 1 tablet by mouth daily 2/25/19     Elva Stevens APRZARA - CNS                  OBJECTIVE:     MEDICATIONS:     Scheduled Meds:   atorvastatin  80 mg Oral Nightly    metoprolol succinate  50 mg Oral BID    isosorbide mononitrate  60 mg Oral Daily    cilostazol  50 mg Oral BID    sodium chloride flush  10 mL Intravenous 2 times per day    enoxaparin  40 mg Subcutaneous BID    piperacillin-tazobactam  3.375 g Intravenous Q8H    furosemide  40 mg Oral BID    levothyroxine  50 mcg Oral Daily    lisinopril  10 mg Oral Daily    meclizine  25 mg Oral TID    montelukast  10 mg Oral Daily    sertraline  50 mg Oral Daily    mupirocin   Topical Daily    insulin glargine  35 Units Subcutaneous Nightly    insulin lispro  0-12 Units Subcutaneous TID WC    insulin lispro  0-6 Units Subcutaneous Nightly     Continuous Infusions:   sodium chloride 50 mL/hr at 05/30/19 0258    dextrose      dextrose       PRN Meds:sodium chloride flush, acetaminophen, magnesium hydroxide, ondansetron, glucose, dextrose, glucagon (rDNA), dextrose, glucose, dextrose, glucagon (rDNA), dextrose    PHYSICAL EXAM:    CURRENT VITALS: /60   Pulse 70   Temp 98.4 °F (36.9 °C) (Oral)   Resp 18   Ht 5' 4\" (1.626 m)   Wt 198 lb 3.2 oz (89.9 kg)   SpO2 98%   BMI 34.02 kg/m²     CONSTITUTIONAL:  awake, alert, cooperative, no apparent distress,   ENT:  Normocephalic, without obvious abnormality, atraumatic, sinuses nontender on palpation, external ears without lesions,  NECK:  Supple, apical hypokinesis as described above. 6. Comparison: No previous study is available for comparison. 7. COMMENTS:  8. High risk myocardial perfusion study based on multiple perfusion abnormalities. 9. Clinical correlation is advised.     PVR Rest / EX LEGS:  5/16/19  Severe bilateral lower extremity atherosclerotic disease, possibly worse on the left reduced specificity and sensitivity is noted due to noncompressibility of the high thigh and low thigh cuffs. Toe pressures also indicate significant disease, and even some poor wound healing. JIMI; Right . 44, Left .32. Melissa Congo CTA ABD and LL RO:  Preliminary Reviewed. Diffuse PVD with Bilateral Trifurction PVOD. Full Report to follow. CATH With CAROTIDS AND PVR:    Hemodynamics:    /30, /60.    There was no aortic valve gradient noted on pullback pressure across the aortic valve.     Left ventriculography:    MOYA left ventriculography revealed Severe Apical Diskinesis with otherwise preserve LV Function.   Left ventricular ejection fraction overall was 35-45%.    There was 2+ mitral regurgitation.     Coronary cineangiography:       Left Main:    Normal.     Left anterior descending:    Large-caliber vessel with proximal and mid calcification. Mid LAD after first diagonal with 85-95% stenosis; mid distal LAD after second diagonal with 80% stenosis. Distal LAD wraparound vessel. Diagonal 1 and diagonal 2 small caliber vessels with moderate disease.     Left circumflex:    Moderately large vessel with mid left circumflex 80% focal stenosis. Large second obtuse marginal branch without disease.   Obtuse marginal 3 small vessel with diffuse 80% disease.     Right coronary artery:    Large dominant vessel with 100% ostial occlusion with right to right and left to right collaterals filling the distal vessel.    Posterolateral and PDA were visualized vessels by collaterals from the left system.        There were no fixed or reversible stenoses identified otherwise.     Abdominal aortography, digital subtraction including bilateral lower extremity extremity selective angiography runoff:     SMA and celiac artery visualized without significant disease. Renal arteries with normal right renal artery, 40% proximal left renal artery. Distal abdominal aorta without AAA and with mild PVD 20% luminal irregularities. Aortic bifurcation and bilateral iliac arteries were free of significant.     Right lower extremity selective angiography:   Right SFA proximal mid free of significant disease with distal long 50-60% disease. Right popliteal 100% mid occlusion. Right trifurcation disease with diffuse 3 vessel disease involving 100% posterior tibial and anterior tibial and bone severely diseased peroneal vessel with significant collaterals.     Left lower extremity selective angiography:  Left SFA with long distal SFA 60-70% stenosis only. Left popliteal mid distal diffuse 95% disease. Left trifurcation disease with diffuse three-vessel disease including three-vessel occlusion with significant collateral flow reconstitution distally.     Carotid selective arteriography bilateral:.     Right internal carotid artery 50-60% proximal, calcified, ulcerative. Left internal carotid artery 70-80% proximal, calcified, ulcerative. Cath Impression:    1.  Abnormal cardiac catheterization. 2   Severe three-vessel coronary artery disease with 95% mid LAD, 80% distal LAD, 80% mid left circumflex, 100% right coronary ostial occlusion with distal PL/PDA collaterals from left and right systems. 3.  Ischemic cardiomyopathy with moderate sized apical dyskinesia, LVEF 35-40%. 4.  Moderate mitral regurgitation  5. Left renal artery 40% proximal stenosis   6. Severe peripheral vascular disease with bilateral distal SFA and bilateral three-vessel below the knee trifurcation disease as noted above.   7.  Bilateral internal carotid artery proximal stenoses, right 50-60%; left

## 2019-05-30 NOTE — PROGRESS NOTES
normocephalic and atraumatic  Eyes: anicteric sclerae  ENT: oropharynx clear and moist with normal mucous membranes. No oral thrush  Lungs: normal respiratory effort  Abdomen: soft, no tenderness  No leg edema  No erythema, no tenderness  R 3rd toe with black discoloration and necrotic ulcer over lateral aspect, no change since admission, no drainage, no malodor  Non palpable peripheral pulses R foot    DATA:    Lab Results   Component Value Date    WBC 9.5 05/30/2019    HGB 11.1 (L) 05/30/2019    HCT 31.7 (L) 05/30/2019    MCV 72.7 (L) 05/30/2019     05/30/2019     Lab Results   Component Value Date    CREATININE 1.13 (H) 05/30/2019    BUN 18 05/30/2019     05/30/2019    K 4.2 05/30/2019    K 4.2 05/30/2019     05/30/2019    CO2 28 05/30/2019       Hepatic Function Panel:  Lab Results   Component Value Date    ALKPHOS 71 05/30/2019    ALT 9 05/30/2019    AST 12 05/30/2019    PROT 6.3 05/30/2019    BILITOT 0.3 05/30/2019    LABALBU 3.1 05/30/2019       Microbiology:   No results for input(s): BC in the last 72 hours. No results for input(s): Swati Forbes in the last 72 hours.   Imaging:     Edema in the 3rd proximal phalanx concerning for osteomyelitis   Susceptibility   Wound Cx on 04/23  Acinetobacter baumannii (1)     Antibiotic Interpretation ROBEL Status   ampicillin-sulbactam Sensitive <=2 mcg/mL    cefepime Intermediate 16 mcg/mL    cefTRIAXone Intermediate 16 mcg/mL    ciprofloxacin Sensitive <=0.25 mcg/mL    gentamicin Sensitive 2 mcg/mL    imipenem Sensitive <=0.25 mcg/mL    tobramycin Sensitive <=         IMPRESSION:    R 3rd toe gangrene/ acute osteomyelitis  Acinetobacter infection  DM 2 with PAD    Patient Active Problem List   Diagnosis    [facility] with [facility]    Type 2 diabetes mellitus with hyperglycemia, with long-term current use of insulin [facility]    HTN (hypertension)    HLD (hyperlipidemia)    Hypothyroid    HF [facility] failure) [facility]    Non-pressure ulcer of toe ([facility] [address]    Atherosclerotic PVD with intermittent claudication (HCC)    Acute osteomyelitis ([facility] [facility]    Skin infection    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       PLAN:  · Continue IV Zosyn for now  · Change to PO Augmentin on D/C  · Change to PO Cipro on D/C if surgery margins are clear of infection  · Agree with revascularization and toe amputation    Discussed with RN, patient and family    Page Dance, MD

## 2019-05-30 NOTE — PROGRESS NOTES
y.o. year old female with significant PMH of DM2, HTN, HLD, HF, PVD seen for chronic ulcer right 3rd toe. Has been applying the Bactroban daily to the wound. She saw a cardiologist for her circulation. Her ulcer has started draining a clear fluid since last seeing Dr. Mello Armijo, but no redness or swelling of the foot. MRI + for OM of the proximal phalanx. Underwent cardiac cath and BLE angio, which were both abnormal. She was noted to have significant CAD requiring some type of intervention per cardiology. She also has R pop occlusion and trifurcation disease. No current facility-administered medications on file prior to encounter.       Current Outpatient Medications on File Prior to Encounter   Medication Sig Dispense Refill    lisinopril (PRINIVIL;ZESTRIL) 10 MG tablet Take 10 mg by mouth daily      ibuprofen (IBU) 400 MG tablet Take 1 tablet by mouth every 6 hours as needed for Pain 20 tablet 0    sertraline (ZOLOFT) 50 MG tablet Take 1 tablet by mouth daily 30 tablet 0    insulin lispro (HUMALOG) 100 UNIT/ML injection vial Inject 0-6 Units into the skin 3 times daily (with meals) 1 vial 3    insulin lispro (HUMALOG) 100 UNIT/ML injection vial Inject 5 Units into the skin 3 times daily (with meals) 1 vial 3    insulin glargine (LANTUS) 100 UNIT/ML injection vial Inject 35 Units into the skin daily 1 vial 3    metoprolol succinate (TOPROL XL) 50 MG extended release tablet Take 1 tablet by mouth daily 30 tablet 3    levothyroxine (SYNTHROID) 50 MCG tablet Take 50 mcg by mouth Daily      furosemide (LASIX) 40 MG tablet Take 40 mg by mouth 2 times daily      simvastatin (ZOCOR) 40 MG tablet Take 40 mg by mouth daily      meclizine (ANTIVERT) 25 MG tablet Take 25 mg by mouth three times daily      metFORMIN (GLUCOPHAGE) 1000 MG tablet Take 1,000 mg by mouth every 12 hours      montelukast (SINGULAIR) 10 MG tablet Take 10 mg by mouth daily      SODIUM CHLORIDE, EXTERNAL, 0.9 % SOLN Apply 1 Applicatorful topically daily 1000 mL 2    sodium polystyrene (SPS) 15 GM/60ML suspension Take 60 mLs by mouth 2 times daily for 2 doses 120 mL 0    metoprolol succinate (TOPROL XL) 25 MG extended release tablet Take 1 tablet by mouth daily 30 tablet 3     REVIEW OF SYSTEMS  See interval history    OBJECTIVE:  /60   Pulse 70   Temp 98.4 °F (36.9 °C) (Oral)   Resp 18   Ht 5' 4\" (1.626 m)   Wt 198 lb 3.2 oz (89.9 kg)   SpO2 98%   BMI 34.02 kg/m²   Patient is alert and oriented x 3 in NAD. Vascular:   Non Palpable Dorsalis Pedis and non Palpable Posterior Tibial Pulses B/L   Capillary Fill time < 5 seconds to B/L digits  Skin temperature warm to cool tibial tuberosity to the digits B/L  Hair growth present to digits  Mild edema  No varicosities   Dysvascular changes to the right 3rd toe    Neurological:   Epicritic sensation intact B/L  Protective sensation via monofilament testing intact B/L    Musculoskeletal/Orthopaedic:   5/5 muscle strength Dorsiflexion, Plantarflexion, Inversion, Eversion B/L  + pain on palpation to 3rd toe of the right foot. Dermatological:   Ulceration #1:   Location: Lateral aspect of 3rd toe right foot  Measurements: 1.2 cm x 0.8 cm x 0.3 cm  Base: fibro-necrotic tissue  Borders: hyperkeratotic  Exudate: no drainage   Comments: No periwound erythema and edema beyond wound borders with no evidence of ascending lymphangitis. Probes to PIPJ of the toe. Unchanged.     LABS:   Lab Results   Component Value Date    WBC 9.5 05/30/2019    HGB 11.1 (L) 05/30/2019    HCT 31.7 (L) 05/30/2019    MCV 72.7 (L) 05/30/2019     05/30/2019     Lab Results   Component Value Date     05/30/2019    K 4.2 05/30/2019    K 4.2 05/30/2019     05/30/2019    CO2 28 05/30/2019    BUN 18 05/30/2019    CREATININE 1.13 05/30/2019    GLUCOSE 106 05/30/2019    GLUCOSE 131 05/22/2019    CALCIUM 8.7 05/30/2019      Lab Results   Component Value Date    LABALBU 3.1 (L) 05/30/2019     Lab Results Component Value Date    SEDRATE 49 (H) 05/29/2019     Lab Results   Component Value Date    CRP 11.7 (H) 05/25/2019     Lab Results   Component Value Date    LABA1C 7.0 (H) 05/25/2019     IMAGING:   X-ray of Right toes as read by Radiologist on 5/25/19  Impression   SOFT TISSUE SWELLING AND MILD OSTEOARTHRITIS IN THE RIGHT 3RD TOE. MRI right foot as read by the radiologist on 5/26/19  Impression     Edema in the 3rd proximal phalanx concerning for osteomyelitis.         Arterial Duplex of the Right Leg as read by Cardiologist on 4/20/19  Impression       MODERATE PREDOMINANTLY DISTAL ATHEROSCLEROTIC DISEASE OF THE RIGHT LOWER EXTREMITY.       NO EVIDENCE OF A FLOW-LIMITING STENOSIS, ARTERIAL OCCLUSION, OR ANEURYSM IDENTIFIED.                  Anthony Trimble DPM PGY-2  Podiatric Surgery Resident  Pager: [phone]  May 30, 2019  9:16 AM

## 2019-05-30 NOTE — PROGRESS NOTES
Physical Therapy Med Surg Daily Treatment Note  Facility/Department: [facility]  [id number]       NAME: Ila Flood  : 1955 (10 y.o.)  MRN: [id number]  CODE STATUS: Full Code    Date of Service: 2019    Patient Diagnosis(es): Skin infection [L08.9]   Chief Complaint   Patient presents with    Toe Pain     right 4th toe pain/numbness x 3 months. States toe is black     Patient Active Problem List    Diagnosis Date Noted    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     Skin infection 2019    Acute osteomyelitis ([address] 2019    Atherosclerotic PVD with intermittent claudication ([address] 2019    Non-pressure ulcer of toe ([address] 2019    Type 2 diabetes mellitus with hyperglycemia, with long-term current use of insulin ([address] 2019    HTN (hypertension) 2019    HLD (hyperlipidemia) 2019    Hypothyroid 2019    HF (heart failure) ([address] 2019    Severe major depression with psychotic features ([address] 2019        Past Medical History:   Diagnosis Date    Asthma     Colitis     Diabetes mellitus ([address]     Hyperlipidemia     Hypertension     PVD (peripheral vascular disease) ([address]     Thyroid disease      Past Surgical History:   Procedure Laterality Date     SECTION      HYSTERECTOMY           Restrictions:       SUBJECTIVE:  General  Chart Reviewed: Yes  Family / Caregiver Present: No  Subjective  Subjective: ill walk after i get a new robe. only a little bit of pain in my foot    Pre Pain Assessment:     Pain Screening  Patient Currently in Pain: Yes  Pre Treatment Pain Screening  Intervention List: Patient able to continue with treatment    Post Pain Assessment:   Pain Assessment  Pain Assessment: (no number given.  \"little pain\")       OBJECTIVE:         Bed mobility  Rolling to Left: Independent  Rolling to Right: Independent  Supine to Sit: Independent  Sit to Supine: Independent  Comment: quick movements    Transfers  Sit to Stand: Supervision  Stand to sit: Supervision  Bed to Chair: Supervision  Comment: quick movements, proper hand placement, no need for VC     Ambulation  Ambulation?: Yes  Ambulation 1  Surface: level tile  Device: Rolling Walker  Assistance: Supervision  Quality of Gait: occasional vary from straight walking path, short shuffling steps, lateral sway, no LOB, able to negotiate obstacles in environment   Distance: 50'x2   Comments: fatigued following  Stairs/Curb  Stairs?: No                                ASSESSMENT:     Pt was demonstrated ability to safely and correctly perform bed mobility and transfers without need for VC or assistance. Pt was able to tolerate ambulation despite minimal pain in her foot.         Activity Tolerance  Activity Tolerance: Patient Tolerated treatment well       Discharge Recommendations:  Continue to assess pending progress    Goals:  Short term goals  Short term goal 1: improve strength and balance to safely achieve all goals   Long term goals  Long term goal 1: Ambulate >/= 150' with safest AD with improved radha, step length and foot clearance with increased Lt stance   Long term goal 2: Transfers independently   Patient Goals   Patient goals : return home     PLAN:   Plan  Times per week: 3-6  Current Treatment Recommendations: Strengthening, Balance Training, Functional Mobility Training, Transfer Training, Gait Training, Neuromuscular Re-education, Home Exercise Program, Safety Education & Training, Patient/Caregiver Education & Training, Equipment Evaluation, Education, & procurement  Plan Comment: transfer care of pt to supervising [facility] PT   Safety Devices  Type of devices: Call light within reach, Left in chair, All fall risk precautions in place     [facility] (6 CLICK) [address] Mobility Raw Score : 22     Therapy Time   Individual   Time In 1403   Time Out 1418   Minutes 15      Gait:10  BM/Transfer: [facility] THONY Lamar, 05/30/19 at 2:18 PM

## 2019-05-30 NOTE — CARE COORDINATION
CALL PLACED TO [facility] TRANSF[facility] AND THEY STATED THE PATIENT HAS BEEN ACCEPTED TO [facility], BUT THERE WAS NO BED AVAILABLE THIS MORNING. THEY ARE RECALLING  TO SEE IF A BED IS AVAILABLE, AND WILL CALL BACK WHEN BED IS AVAILABLE. Electronically signed by Frankey Bramble, RN on 5/30/2019 at 12:06PM    [address], STILL NO BED AVAILABLE AT [facility]. PATIENT IS READY FOR TRANSFER  ONCE BED AVAILABLE.  Electronically signed by Frankey Bramble, RN on 5/30/2019 at 5:28 PM

## 2019-05-31 VITALS
HEIGHT: 64 IN | TEMPERATURE: 98.2 F | BODY MASS INDEX: 33.84 KG/M2 | DIASTOLIC BLOOD PRESSURE: 66 MMHG | RESPIRATION RATE: 16 BRPM | HEART RATE: 83 BPM | OXYGEN SATURATION: 97 % | WEIGHT: 198.2 LBS | SYSTOLIC BLOOD PRESSURE: 134 MMHG

## 2019-05-31 PROCEDURE — 2580000003 HC RX 258: Performed by: INTERNAL MEDICINE

## 2019-05-31 RX ADMIN — SODIUM CHLORIDE: 9 INJECTION, SOLUTION INTRAVENOUS at 00:13

## 2019-05-31 NOTE — PROGRESS NOTES
Report called to SALMA Hines at [facility].     Electronically signed by Cande Escalante RN on 5/31/2019 at 2:18 AM

## 2019-05-31 NOTE — PROGRESS NOTES
Physical Therapy  Facility/Department: [facility] MED SURG Z248/[id number]  Physical Therapy Discharge      NAME: Berry Martinez    : 1955 (26 y.o.)  MRN: [id number]    Account: [de-identified]  Gender: female      Patient has been discharged from acute care hospital. DC patient from current PT program.      Electronically signed by Jaimie Chavis PT on 19 at 4:26 PM

## 2019-06-08 NOTE — H&P
distress, appears stated age and cooperative. HEENT:  Normal cephalic, atraumatic without obvious deformity. Pupils equal, round, and reactive to light. Extra ocular muscles intact. Conjunctivae/corneas clear. Neck: Supple, with full range of motion. No jugular venous distention. Trachea midline. Respiratory:  Normal respiratory effort. Clear to auscultation, bilaterally without Rales/Wheezes/Rhonchi. Cardiovascular:  Regular rate and rhythm with normal S1/S2 without murmurs, rubs or gallops. Abdomen: Soft, non-tender, non-distended with normal bowel sounds. Musculoskeletal:  No clubbing, cyanosis or edema bilaterally. Full range of motion without deformity. Necrotic fourth left toe  Skin: Skin color, texture, turgor normal.  No rashes or lesions. Neurologic:  Neurovascularly intact without any focal sensory/motor deficits. Cranial nerves: II-XII intact, grossly non-focal.  Psychiatric:  Alert and oriented, thought content appropriate, normal insight  Capillary Refill: Brisk,< 3 seconds   Peripheral Pulses: +2 palpable, equal bilaterally       Labs:     No results for input(s): WBC, HGB, HCT, PLT in the last 72 hours. No results for input(s): NA, K, CL, CO2, BUN, CREATININE, CALCIUM, PHOS in the last 72 hours. Invalid input(s): MAGNES  No results for input(s): AST, ALT, BILIDIR, BILITOT, ALKPHOS in the last 72 hours. No results for input(s): INR in the last 72 hours. No results for input(s): Eward Pong in the last 72 hours.     Urinalysis:      Lab Results   Component Value Date    NITRU Negative 02/21/2019    BLOODU Negative 02/21/2019    SPECGRAV 1.018 02/21/2019    GLUCOSEU 250 02/21/2019       Radiology:     CXR: I have reviewed the CXR with the following interpretation: pending  EKG:  I have reviewed the EKG with the following interpretation: pending    RADIOLOGY REPORT   Final Result      CTA ABDOMINAL AORTA W BILAT RUNOFF W WO CONTRAST   Final Result      EXTENSIVE MULTIFOCAL DISEASE BILATERALLY IN THE SFA, POPLITEAL AND CALF VESSELS, AS DISCUSSED ABOVE.      US CAROTID ARTERY BILATERAL   Final Result   PLAQUE IN BOTH DISTAL COMMON CAROTID ARTERIES AND AT THE CAROTID BIFURCATIONS. ESTIMATED RIGHT INTERNAL CAROTID ARTERY STENOSIS IS 50-69% AND ESTIMATED LEFT INTERNAL CAROTID ARTERY STENOSIS IS 50-69%. Validated velocity measurements with angiographic measurements and velocity criteria are extrapolated from diameter data as defined by the Society of Radiologists in [address]. Radiology 2003; 937;366-847. MRI FOOT RIGHT W WO CONTRAST   Final Result     Edema in the 3rd proximal phalanx concerning for osteomyelitis. XR TOE RIGHT (MIN 2 VIEWS)   Final Result   SOFT TISSUE SWELLING AND MILD OSTEOARTHRITIS IN THE RIGHT 3RD TOE. ASSESSMENT:    Active Hospital Problems    Diagnosis Date Noted    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 [A49.8]     Skin infection [L08.9] 05/25/2019    Acute osteomyelitis [facility]) [M86.10] 05/14/2019       PLAN:        DVT Prophylaxis: lovenox  Diet: No diet orders on file  Code Status: Prior    PT/OT Eval Status: eval and tx    1. Acute osteomyelitis-will admit and consult ID and podiatry. Will medicate with iv abx  2. Skin infection       JC Silva    Thank you [facility] for the opportunity to be involved in this patient's care. If you have any questions or concerns please feel free to contact me.

## 2019-06-14 NOTE — PROCEDURES
[address]                      [address], [address] [facility]                            CARDIAC CATHETERIZATION    PATIENT NAME: Tip Merchant              :        1955  MED REC NO:   [id number]                            ROOM:       C926  ACCOUNT NO:   [de-identified]                           ADMIT DATE: 2019  PROVIDER:     Pradip Almendarez MD    DATE OF PROCEDURE:  2019    ADDENDUM    TECHNIQUE:  In addition to the cardiac catheterization note as  previously dictated on 2019, technique transcribed incomplete and  need to add the additional procedures performed at that time:    Pigtail catheter was placed in the abdominal aorta at the level of the  renal arteries and PA projection digital subtractive aortography was  performed using 20 mL of contrast.  Catheter was then repositioned in  the abdominal aorta just above the abdominal aortic bifurcation and  abdominal aortography digital subtraction with long leg runoff  angiography of bilateral lower extremities was performed using 80 mL of  contrast.  Pigtail catheter was also placed in the ascending aorta just  above the aortic valve and ascending aortography with great vessel  runoff angiography was performed digital substraction. Using a 4-Tamazight  JR4 catheter, bilateral selective internal carotid arteries were engaged  and bilateral selective angiography using digital subtraction in angle  views was performed. No other changes to this report are needed other than as noted above.         Dagoberto Victor MD    D: 2019 14:45:21       T: 2019 16:40:55     [email or web address]  Job#: [id number]     Doc#: [id number]      CC:

## 2019-07-26 ENCOUNTER — HOSPITAL ENCOUNTER (OUTPATIENT)
Dept: WOUND CARE | Age: 64
Discharge: HOME | End: 2019-07-26
Payer: COMMERCIAL

## 2019-07-26 VITALS
TEMPERATURE: 97.6 F | DIASTOLIC BLOOD PRESSURE: 73 MMHG | RESPIRATION RATE: 16 BRPM | HEART RATE: 86 BPM | SYSTOLIC BLOOD PRESSURE: 167 MMHG

## 2019-07-26 DIAGNOSIS — T81.31XA SURGICAL WOUND DEHISCENCE, INITIAL ENCOUNTER: Chronic | ICD-10-CM

## 2019-07-26 DIAGNOSIS — Z89.421 S/P AMPUTATION OF LESSER TOE, RIGHT (HCC): Chronic | ICD-10-CM

## 2019-07-26 PROCEDURE — 11042 DBRDMT SUBQ TIS 1ST 20SQCM/<: CPT

## 2019-07-26 RX ORDER — ARIPIPRAZOLE 10 MG/1
20 TABLET ORAL DAILY
Status: ON HOLD | COMMUNITY
End: 2020-02-11

## 2019-07-26 RX ORDER — FOLIC ACID 1 MG/1
1 TABLET ORAL DAILY
COMMUNITY

## 2019-07-26 RX ORDER — LANOLIN ALCOHOL/MO/W.PET/CERES
3 CREAM (GRAM) TOPICAL DAILY
Status: ON HOLD | COMMUNITY
End: 2020-02-11

## 2019-07-26 RX ORDER — MULTIVIT-MIN/IRON/FOLIC ACID/K 18-600-40
2000 CAPSULE ORAL DAILY
Status: ON HOLD | COMMUNITY
End: 2020-09-02

## 2019-07-26 RX ORDER — CLOPIDOGREL BISULFATE 75 MG/1
75 TABLET ORAL DAILY
Status: ON HOLD | COMMUNITY
End: 2020-05-12 | Stop reason: HOSPADM

## 2019-07-26 RX ORDER — ASPIRIN 81 MG/1
81 TABLET, CHEWABLE ORAL DAILY
Status: ON HOLD | COMMUNITY
End: 2020-06-29 | Stop reason: HOSPADM

## 2019-07-26 RX ORDER — CYANOCOBALAMIN (VITAMIN B-12) 1000 MCG
1000 TABLET, EXTENDED RELEASE ORAL DAILY
COMMUNITY

## 2019-07-26 RX ORDER — CHOLECALCIFEROL (VITAMIN D3) 1250 MCG
1 CAPSULE ORAL WEEKLY
Status: ON HOLD | COMMUNITY
End: 2020-09-02

## 2019-07-26 ASSESSMENT — PAIN SCALES - GENERAL: PAINLEVEL_OUTOF10: 7

## 2019-07-26 ASSESSMENT — PAIN DESCRIPTION - LOCATION: LOCATION: FOOT

## 2019-07-26 ASSESSMENT — PAIN DESCRIPTION - FREQUENCY: FREQUENCY: INTERMITTENT

## 2019-07-26 ASSESSMENT — PAIN DESCRIPTION - ONSET: ONSET: AWAKENED FROM SLEEP

## 2019-07-26 ASSESSMENT — PAIN DESCRIPTION - PAIN TYPE: TYPE: ACUTE PAIN

## 2019-07-26 ASSESSMENT — PAIN DESCRIPTION - ORIENTATION: ORIENTATION: RIGHT

## 2019-07-26 ASSESSMENT — PAIN DESCRIPTION - DESCRIPTORS: DESCRIPTORS: BURNING

## 2019-07-26 NOTE — CODE DOCUMENTATION
[address] Physician Billing Sheet. Parley Kussmaul  AGE: 66 y.o.    GENDER: female  : 1955  TODAY'S DATE:  2019    ICD-10  [facility] Problems    Diagnosis Date Noted    Surgical wound dehiscence, initial encounter Aviva Dave 2019    S/P amputation of lesser toe, right ([facility] [Z89.421] 2019    Acute osteomyelitis ([facility] [M86.10] 2019    Atherosclerotic PVD with intermittent claudication ([facility] [I70.219] 2019    Non-pressure ulcer of toe ([facility] [L97.509] 2019    Type 2 diabetes mellitus with hyperglycemia, with long-term current use of insulin (HCC) [E11.65, Z79.4] 2019       PHYSICIAN PROCEDURES    CPT CODE  75277-82  54616 RT      Electronically signed by Kyrie Paul DPM on 2019 at 1:22 PM

## 2019-07-26 NOTE — PROGRESS NOTES
[address]                                                   Progress Note and Procedure Note      Garth Mota RECORD NUMBER:  [id number]  AGE: 66 y.o. GENDER: female  : 1955  EPISODE DATE:  2019    Subjective:     Chief Complaint   Patient presents with    Wound Check     right third toe wound recheck         HISTORY of PRESENT ILLNESS ROHIT Juarez is a 66 y.o. female who presents today for wound/ulcer evaluation. History of Wound Context: Dehiscience of right 3rd amputation site of of toe. Patient was seen in the hospital back in May and then Transferrred to [facility] for CABG and LE revasc and amputation of the right 3rd toe secondary to gangrene and osteomyelitis. I have to no records as to when the amputation was done. Patient denies any NVFC. Patient is currently going to be discharged from the SNF today. Wound/Ulcer Pain Timing/Severity: intermittent  Quality of pain: N/A  Severity:  1 / 10   Modifying Factors: None  Associated Signs/Symptoms: none    Ulcer Identification:  Ulcer Type: diabetic and neuropathic  Contributing Factors: diabetes    Wound: N/A        PAST MEDICAL HISTORY        Diagnosis Date    Asthma     Colitis     Diabetes mellitus ([facility]     Hyperlipidemia     Hypertension     PVD (peripheral vascular disease) ([facility]     Thyroid disease        PAST SURGICAL HISTORY    Past Surgical History:   Procedure Laterality Date     SECTION      HYSTERECTOMY         FAMILY HISTORY    History reviewed. No pertinent family history.     SOCIAL HISTORY    Social History     Tobacco Use    Smoking status: Never Smoker    Smokeless tobacco: Never Used   Substance Use Topics    Alcohol use: Never     Frequency: Never    Drug use: Not on file       ALLERGIES    Allergies   Allergen Reactions    Ambien [Zolpidem Tartrate]     Capoten [Captopril]     Clioquinol     Cogentin [Benztropine]     Depakote [Divalproex Sodium]     examined and debrided. Follow up in 2 weeks  Signs and symptoms of infection were explained in [place]. Treatment Note please see attached Discharge Instructions    Written patient dismissal instructions given to patient and signed by patient or POA. Discharge Instructions       [facility] Wound Care    Physician Orders and Discharge Instructions  [facility]  [address] [facility]  [facility]  Telephone: [phone]      NAME:  Chari Cao  YOB: 1955  MEDICAL RECORD NUMBER:  [id number]    Home Care/Facility:  [facility]     Wound Location:  RIGHT THIRD TOE AMPUTATION SITE    Dressing orders: 1. Cleanse wound(s) with normal saline. 2. Apply a nickel thickness layer of SANTYL OINTMENT to the wound bed for enzymatic debridement purposes. 3. Apply a moistened saline 4x4 (gauze pad) over the Santyl Ointment. 4. Cover with additional dry 4x4's and wrap with gauze (makenzie or kerlix). 5. Change Every Day. Compression:  NONE TO RIGHT LEG    Offloading Device:  PATIENT TO WEAR OFFLOADING SHOE    Other Instructions: APPLY LOTION TO RIGHT FOOT DAILY, DO NOT GET IN WOUND, PATIENT MAY AMBULATE IN OFFLOADING SHOE. Keep all dressings clean, dry and intact. Keep pressure off the wound(s) at all times. Follow up visit   2 Weeks  AUGUST 9, 2019 AT     Please give 24 hour notice if unable to keep appointment. [phone]    If you experience any of the following, please call the Wound Care Service at  [phone] or go to the nearest emergency room. *Increase in pain *Temperature over 101 *Increase in drainage from your wound or a foul odor  *Uncontrolled swelling *Need for compression bandage changes due to slippage, breakthrough drainage       PLEASE NOTE: IF YOU ARE UNABLE TO OBTAIN WOUND SUPPLIES, CONTINUE TO USE THE SUPPLIES YOU HAVE AVAILABLE UNTIL YOU ARE ABLE TO REACH US.  IT IS MOST IMPORTANT TO KEEP THE WOUND COVERED

## 2019-07-29 ENCOUNTER — TELEPHONE (OUTPATIENT)
Dept: FAMILY MEDICINE CLINIC | Age: 64
End: 2019-07-29

## 2019-08-09 ENCOUNTER — HOSPITAL ENCOUNTER (OUTPATIENT)
Dept: WOUND CARE | Age: 64
Discharge: HOME | End: 2019-08-09
Payer: COMMERCIAL

## 2019-08-09 VITALS
DIASTOLIC BLOOD PRESSURE: 71 MMHG | HEART RATE: 63 BPM | RESPIRATION RATE: 16 BRPM | WEIGHT: 201 LBS | HEIGHT: 64 IN | TEMPERATURE: 98.3 F | SYSTOLIC BLOOD PRESSURE: 141 MMHG | BODY MASS INDEX: 34.31 KG/M2

## 2019-08-09 PROCEDURE — 87077 CULTURE AEROBIC IDENTIFY: CPT

## 2019-08-09 PROCEDURE — 87070 CULTURE OTHR SPECIMN AEROBIC: CPT

## 2019-08-09 PROCEDURE — 87186 SC STD MICRODIL/AGAR DIL: CPT

## 2019-08-09 PROCEDURE — 11042 DBRDMT SUBQ TIS 1ST 20SQCM/<: CPT

## 2019-08-09 PROCEDURE — 87205 SMEAR GRAM STAIN: CPT

## 2019-08-09 ASSESSMENT — PAIN DESCRIPTION - DESCRIPTORS: DESCRIPTORS: THROBBING

## 2019-08-09 ASSESSMENT — PAIN DESCRIPTION - LOCATION: LOCATION: FOOT

## 2019-08-09 ASSESSMENT — PAIN DESCRIPTION - FREQUENCY: FREQUENCY: INTERMITTENT

## 2019-08-09 ASSESSMENT — PAIN SCALES - GENERAL: PAINLEVEL_OUTOF10: 5

## 2019-08-09 ASSESSMENT — PAIN DESCRIPTION - ORIENTATION: ORIENTATION: RIGHT

## 2019-08-09 NOTE — PROGRESS NOTES
[address]                                                   Progress Note and Procedure Note      Garth Mota RECORD NUMBER:  [id number]  AGE: 66 y.o. GENDER: female  : 1955  EPISODE DATE:  2019    Subjective:     Chief Complaint   Patient presents with    Wound Check     right 3rd toe         HISTORY of PRESENT ILLNESS HPI     Blanche Yip is a 66 y.o. female who presents today for wound/ulcer evaluation. History of Wound Context: Dehiscience of right 3rd amputation site of of toe. Patient was seen in the hospital back in May and then Transferrred to [facility] for CABG and LE revasc and amputation of the right 3rd toe secondary to gangrene and osteomyelitis. I have to no records as to when the amputation was done. Patient denies any NVFC. Patient is currently going to be discharged from the SNF today. Wound/Ulcer Pain Timing/Severity: intermittent  Quality of pain: N/A  Severity:  1 / 10   Modifying Factors: None  Associated Signs/Symptoms: none    Ulcer Identification:  Ulcer Type: diabetic and neuropathic  Contributing Factors: diabetes    Wound: N/A        PAST MEDICAL HISTORY        Diagnosis Date    Asthma     Colitis     Diabetes [facility]     Hyperlipidemia     Hypertension     PVD (peripheral vascular disease) ([facility]     Thyroid disease        PAST SURGICAL HISTORY    Past Surgical History:   Procedure Laterality Date     SECTION      HYSTERECTOMY         FAMILY HISTORY    History reviewed. No pertinent family history.     SOCIAL HISTORY    Social History     Tobacco Use    Smoking status: Never Smoker    Smokeless tobacco: Never Used   Substance Use Topics    Alcohol use: Never     Frequency: Never    Drug use: Not on file       ALLERGIES    Allergies   Allergen Reactions    Ambien [Zolpidem Tartrate]     Capoten [Captopril]     Clioquinol     Cogentin [Benztropine]     Depakote [Divalproex Sodium]     Effexor Xr [employer] daily 1 vial 3    metoprolol succinate (TOPROL XL) 25 MG extended release tablet Take 1 tablet by mouth daily 30 tablet 3    levothyroxine (SYNTHROID) 50 MCG tablet Take 50 mcg by mouth Daily      furosemide (LASIX) 40 MG tablet Take 40 mg by mouth 2 times daily      simvastatin (ZOCOR) 40 MG tablet Take 40 mg by mouth daily      meclizine (ANTIVERT) 25 MG tablet Take 25 mg by mouth three times daily      metFORMIN (GLUCOPHAGE) 1000 MG tablet Take 1,000 mg by mouth every 12 hours      montelukast (SINGULAIR) 10 MG tablet Take 10 mg by mouth daily      metoprolol succinate (TOPROL XL) 50 MG extended release tablet Take 1 tablet by mouth daily 30 tablet 3     No current facility-administered medications on file prior to encounter. REVIEW OF SYSTEMS    Pertinent items are noted in HPI. Objective:      BP (!) 141/71   Pulse 63   Temp 98.3 °F (36.8 °C) (Temporal)   Resp 16   Ht 5' 4\" (1.626 m)   Wt 201 lb (91.2 kg)   BMI 34.50 kg/m²     Wt Readings from Last 3 Encounters:   08/09/19 201 lb (91.2 kg)   05/30/19 198 lb 3.2 oz (89.9 kg)   05/14/19 201 lb (91.2 kg)       PHYSICAL EXAM    Constitutional:   Well nourished and well developed. Appears neat and clean. Patient is alert, oriented x3, and in no apparent distress. Respiratory:  Respiratory effort is easy and symmetric bilaterally. Rate is normal at rest and on room air. Vascular:  Pedal Pulses is palpable and audible with doppler. Capillary refill is <3 sec to digits bilateral.  Extremities negativefor  pitting edema    Neurological:   Sensation is absent to lower extremities. Dermatological:  Wound description noted in wound assessment. Amputation site is full thickness without tunneling and slough at the base. No malodor is noted. No purulence. Culture taken    Psychiatric:  Judgement and insight intact. Short and long term memory intact. No evidence of depression, anxiety, or agitation.   Patient is calm, cooperative, and

## 2019-08-11 LAB
GRAM STAIN RESULT: ABNORMAL
ORGANISM: ABNORMAL
ORGANISM: ABNORMAL
WOUND/ABSCESS: ABNORMAL
WOUND/ABSCESS: ABNORMAL

## 2019-08-20 ENCOUNTER — ANESTHESIA EVENT (OUTPATIENT)
Dept: ENDOSCOPY | Age: 64
End: 2019-08-20
Payer: COMMERCIAL

## 2019-08-20 ENCOUNTER — HOSPITAL ENCOUNTER (OUTPATIENT)
Age: 64
Setting detail: OUTPATIENT SURGERY
Discharge: HOME | End: 2019-08-20
Attending: SPECIALIST | Admitting: SPECIALIST
Payer: COMMERCIAL

## 2019-08-20 ENCOUNTER — ANESTHESIA (OUTPATIENT)
Dept: ENDOSCOPY | Age: 64
End: 2019-08-20
Payer: COMMERCIAL

## 2019-08-20 VITALS
HEART RATE: 69 BPM | RESPIRATION RATE: 16 BRPM | SYSTOLIC BLOOD PRESSURE: 100 MMHG | BODY MASS INDEX: 34.66 KG/M2 | WEIGHT: 203 LBS | DIASTOLIC BLOOD PRESSURE: 66 MMHG | OXYGEN SATURATION: 98 % | TEMPERATURE: 98.1 F | HEIGHT: 64 IN

## 2019-08-20 VITALS
OXYGEN SATURATION: 100 % | DIASTOLIC BLOOD PRESSURE: 53 MMHG | RESPIRATION RATE: 15 BRPM | SYSTOLIC BLOOD PRESSURE: 103 MMHG

## 2019-08-20 PROCEDURE — 3609009500 HC COLONOSCOPY DIAGNOSTIC OR SCREENING: Performed by: SPECIALIST

## 2019-08-20 PROCEDURE — 45378 DIAGNOSTIC COLONOSCOPY: CPT | Performed by: SPECIALIST

## 2019-08-20 PROCEDURE — 2580000003 HC RX 258: Performed by: SPECIALIST

## 2019-08-20 PROCEDURE — 3700000001 HC ADD 15 MINUTES (ANESTHESIA): Performed by: SPECIALIST

## 2019-08-20 PROCEDURE — 7100000011 HC PHASE II RECOVERY - ADDTL 15 MIN: Performed by: SPECIALIST

## 2019-08-20 PROCEDURE — 2580000003 HC RX 258: Performed by: NURSE ANESTHETIST, CERTIFIED REGISTERED

## 2019-08-20 PROCEDURE — 6360000002 HC RX W HCPCS: Performed by: NURSE ANESTHETIST, CERTIFIED REGISTERED

## 2019-08-20 PROCEDURE — 7100000010 HC PHASE II RECOVERY - FIRST 15 MIN: Performed by: SPECIALIST

## 2019-08-20 PROCEDURE — 2500000003 HC RX 250 WO HCPCS: Performed by: NURSE ANESTHETIST, CERTIFIED REGISTERED

## 2019-08-20 PROCEDURE — 3700000000 HC ANESTHESIA ATTENDED CARE: Performed by: SPECIALIST

## 2019-08-20 RX ORDER — SODIUM CHLORIDE 9 MG/ML
INJECTION, SOLUTION INTRAVENOUS CONTINUOUS PRN
Status: DISCONTINUED | OUTPATIENT
Start: 2019-08-20 | End: 2019-08-20 | Stop reason: SDUPTHER

## 2019-08-20 RX ORDER — SODIUM CHLORIDE 0.9 % (FLUSH) 0.9 %
10 SYRINGE (ML) INJECTION PRN
Status: DISCONTINUED | OUTPATIENT
Start: 2019-08-20 | End: 2019-08-20 | Stop reason: HOSPADM

## 2019-08-20 RX ORDER — LIDOCAINE HYDROCHLORIDE 10 MG/ML
INJECTION, SOLUTION INFILTRATION; PERINEURAL PRN
Status: DISCONTINUED | OUTPATIENT
Start: 2019-08-20 | End: 2019-08-20 | Stop reason: SDUPTHER

## 2019-08-20 RX ORDER — SODIUM CHLORIDE 0.9 % (FLUSH) 0.9 %
10 SYRINGE (ML) INJECTION EVERY 12 HOURS SCHEDULED
Status: DISCONTINUED | OUTPATIENT
Start: 2019-08-20 | End: 2019-08-20 | Stop reason: HOSPADM

## 2019-08-20 RX ORDER — ONDANSETRON 2 MG/ML
4 INJECTION INTRAMUSCULAR; INTRAVENOUS
Status: DISCONTINUED | OUTPATIENT
Start: 2019-08-20 | End: 2019-08-20 | Stop reason: HOSPADM

## 2019-08-20 RX ORDER — LIDOCAINE HYDROCHLORIDE 10 MG/ML
1 INJECTION, SOLUTION EPIDURAL; INFILTRATION; INTRACAUDAL; PERINEURAL
Status: DISCONTINUED | OUTPATIENT
Start: 2019-08-20 | End: 2019-08-20 | Stop reason: HOSPADM

## 2019-08-20 RX ORDER — PROPOFOL 10 MG/ML
INJECTION, EMULSION INTRAVENOUS PRN
Status: DISCONTINUED | OUTPATIENT
Start: 2019-08-20 | End: 2019-08-20 | Stop reason: SDUPTHER

## 2019-08-20 RX ORDER — SODIUM CHLORIDE 9 MG/ML
INJECTION, SOLUTION INTRAVENOUS CONTINUOUS
Status: DISCONTINUED | OUTPATIENT
Start: 2019-08-20 | End: 2019-08-20 | Stop reason: HOSPADM

## 2019-08-20 RX ADMIN — LIDOCAINE HYDROCHLORIDE 30 MG: 10 INJECTION, SOLUTION INFILTRATION; PERINEURAL at 11:20

## 2019-08-20 RX ADMIN — SODIUM CHLORIDE: 9 INJECTION, SOLUTION INTRAVENOUS at 11:13

## 2019-08-20 RX ADMIN — PROPOFOL 200 MG: 10 INJECTION, EMULSION INTRAVENOUS at 11:20

## 2019-08-20 RX ADMIN — SODIUM CHLORIDE: 9 INJECTION, SOLUTION INTRAVENOUS at 10:57

## 2019-08-20 RX ADMIN — PHENYLEPHRINE HYDROCHLORIDE 200 MCG: 10 INJECTION INTRAVENOUS at 11:25

## 2019-08-20 NOTE — ANESTHESIA PRE PROCEDURE
Allergen Reactions    Ambien [Zolpidem Tartrate]     Capoten [Captopril]     Clioquinol     Cogentin [Benztropine]     Depakote [Divalproex Sodium]     Effexor Xr [Venlafaxine Hcl Er]     Geodon [Ziprasidone Hcl]     Navane [Thiothixene]     Pamelor [Nortriptyline Hcl]     Remeron [Mirtazapine]     Risperdal [Risperidone]     Trazodone And Nefazodone     Wellbutrin [Bupropion]        Problem List:    Patient Active Problem List   Diagnosis Code    Severe major depression with psychotic features ([facility]) F32.3    Type 2 diabetes mellitus with hyperglycemia, with long-term current use of insulin ([facility]) E11.65, Z79.4    HTN (hypertension) I10    HLD (hyperlipidemia) E78.5    Hypothyroid E03.9    HF (heart failure) ([facility]) I50.9    Non-pressure ulcer of toe ([facility]) L97.509    Atherosclerotic PVD with intermittent claudication ([facility]) I70.219    Acute osteomyelitis ([facility]) M86.10    Skin infection L08.9    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 A49.8    Surgical wound dehiscence, initial encounter T81.31XA    S/P amputation of lesser toe, right ([facility]) H78.789       Past Medical History:        Diagnosis Date    Asthma     Colitis     Diabetes mellitus ([facility]     Hyperlipidemia     Hypertension     PVD [facility]peripheral vascular disease) ([facility]     Thyroid disease        Past Surgical History:        Procedure Laterality Date     SECTION      HYSTERECTOMY         Social History:    Social History     Tobacco Use    Smoking status: Never Smoker    Smokeless tobacco: Never Used   Substance Use Topics    Alcohol use: Never     Frequency: Never                                Counseling given: Not Answered      Vital Signs (Current): There were no vitals filed for this visit.                                            BP Readings from Last 3 Encounters:   19 (!) 141/71   19 (!) 167/73   19 134/66       NPO Status:

## 2019-08-21 DIAGNOSIS — K62.5 RECTAL BLEEDING: ICD-10-CM

## 2019-08-23 ENCOUNTER — HOSPITAL ENCOUNTER (OUTPATIENT)
Dept: WOUND CARE | Age: 64
Discharge: HOME | End: 2019-08-23
Payer: COMMERCIAL

## 2019-08-23 VITALS
HEART RATE: 63 BPM | RESPIRATION RATE: 16 BRPM | TEMPERATURE: 98.6 F | SYSTOLIC BLOOD PRESSURE: 137 MMHG | DIASTOLIC BLOOD PRESSURE: 65 MMHG

## 2019-08-23 PROCEDURE — 15275 SKIN SUB GRAFT FACE/NK/HF/G: CPT

## 2019-08-23 NOTE — PROGRESS NOTES
[facility] [address]                                                   Progress Note and Procedure Note      Garth Mota RECORD NUMBER:  [id number]  AGE: 66 y.o. GENDER: female  : 1955  EPISODE DATE:  2019    Subjective:     Chief Complaint   Patient presents with    Wound Check     right toe         HISTORY of PRESENT ILLNESS HPI     Nick Villa is a 66 y.o. female who presents today for wound/ulcer evaluation. History of Wound Context: Non-healing ischemic ulcer secondary to amputation of right 3rd toe  Wound/Ulcer Pain Timing/Severity: none, intermittent  Quality of pain: throbbing  Severity:  3 / 10   Modifying Factors: Pain worsens with walking  Associated Signs/Symptoms: drainage and pain    Ulcer Identification:  Ulcer Type: arterial, diabetic and non-healing surgical  Contributing Factors: diabetes, poor glucose control and arterial insufficiency    Wound: N/A        PAST MEDICAL HISTORY        Diagnosis Date    Asthma     CAD (coronary artery disease)     Colitis     [facility] mellitus [facility]     Hyperlipidemia     Hypertension     Prolonged emergence from general anesthesia     PVD [facility]peripheral vascular disease) [facility]     Thyroid disease        PAST SURGICAL HISTORY    Past Surgical History:   Procedure Laterality Date    CARDIAC SURGERY       SECTION      COLONOSCOPY N/A 2019    COLONOSCOPY DIAGNOSTIC performed by Denzel Childers MD at [address] [place] [address] GRAFT  2019    unknown vessels    HYSTERECTOMY      TOE AMPUTATION Right     3rd toe       FAMILY HISTORY    History reviewed. No pertinent family history.     SOCIAL HISTORY    Social History     Tobacco Use    Smoking status: Never Smoker    Smokeless tobacco: Never Used   Substance Use Topics    Alcohol use: Never     Frequency: Never    Drug use: Never       ALLERGIES    Allergies   Allergen Reactions    Ambien [Zolpidem Tartrate]     Capoten [Captopril]     Clioquinol     Cogentin [Benztropine]     Depakote [Divalproex Sodium]     Effexor Xr [Venlafaxine Hcl Er]     Geodon [Ziprasidone Hcl]     Navane [Thiothixene]     Pamelor [Nortriptyline Hcl]     Remeron [Mirtazapine]     Risperdal [Risperidone]     Trazodone And Nefazodone     Wellbutrin [Bupropion]        MEDICATIONS    Current Outpatient Medications on File Prior to Encounter   Medication Sig Dispense Refill    SODIUM CHLORIDE, EXTERNAL, 0.9 % SOLN Apply 1 Applicatorful topically daily 1000 mL 2    collagenase (SANTYL) 250 UNIT/GM ointment Apply topically daily.  30 g 2    ARIPiprazole (ABILIFY) 10 MG tablet Take 10 mg by mouth daily      aspirin 81 MG chewable tablet Take 81 mg by mouth daily      folic acid (FOLVITE) 1 MG tablet Take 1 mg by mouth daily      melatonin 3 MG TABS tablet Take 3 mg by mouth daily Indications: 2 pills      Iron Polysacch [id number] (NIFEREX-150 FORTE PO) Take by mouth      clopidogrel (PLAVIX) 75 MG tablet Take 75 mg by mouth daily      Cyanocobalamin (VITAMIN B-12) 1000 MCG extended release tablet Take 1,000 mcg by mouth daily      Cholecalciferol (VITAMIN D) 2000 units CAPS capsule Take by mouth      SODIUM CHLORIDE, EXTERNAL, 0.9 % SOLN Apply 1 Applicatorful topically daily 1000 mL 2    sodium polystyrene (SPS) 15 GM/60ML suspension Take 60 mLs by mouth 2 times daily for 2 doses 120 mL 0    ibuprofen (IBU) 400 MG tablet Take 1 tablet by mouth every 6 hours as needed for Pain 20 tablet 0    sertraline (ZOLOFT) 50 MG tablet Take 1 tablet by mouth daily 30 tablet 0    insulin lispro (HUMALOG) 100 UNIT/ML injection vial Inject 0-6 Units into the skin 3 times daily (with meals) 1 vial 3    insulin lispro (HUMALOG) 100 UNIT/ML injection vial Inject 5 Units into the skin 3 times daily (with meals) 1 vial 3    insulin glargine (LANTUS) 100 UNIT/ML injection vial Inject 35 Units into the skin daily 1 vial 3    metoprolol succinate (TOPROL XL)

## 2019-08-23 NOTE — CODE DOCUMENTATION
[address] [facility] Physician Billing Sheet. Parley Kussmaul  AGE: 66 y.o.    GENDER: female  : 1955  TODAY'S DATE:  2019    ICD-10 CODES  Active Hospital Problems    Diagnosis Date Noted    S/P amputation of lesser toe, right ([facility] [Z89.421] 2019    Non-pressure ulcer of toe [facility]) [P84.381] 2019       PHYSICIAN PROCEDURES    CPT CODE  27007 RT      Electronically signed by Kyrie Paul DPM on 2019 at 11:53 AM

## 2019-08-30 ENCOUNTER — HOSPITAL ENCOUNTER (OUTPATIENT)
Dept: WOUND CARE | Age: 64
Discharge: HOME | End: 2019-08-30
Payer: COMMERCIAL

## 2019-08-30 VITALS
SYSTOLIC BLOOD PRESSURE: 124 MMHG | DIASTOLIC BLOOD PRESSURE: 57 MMHG | WEIGHT: 203 LBS | HEART RATE: 64 BPM | TEMPERATURE: 98.4 F | BODY MASS INDEX: 34.66 KG/M2 | HEIGHT: 64 IN | RESPIRATION RATE: 16 BRPM

## 2019-08-30 PROCEDURE — 15275 SKIN SUB GRAFT FACE/NK/HF/G: CPT

## 2019-08-30 ASSESSMENT — PAIN SCALES - GENERAL: PAINLEVEL_OUTOF10: 0

## 2019-08-30 NOTE — PROGRESS NOTES
[facility] [address]                                                   Progress Note and Procedure Note      Garth Mota RECORD NUMBER:  [id number]  AGE: 66 y.o. GENDER: female  : 1955  EPISODE DATE:  2019    Subjective:     Chief Complaint   Patient presents with    Wound Check     right 3rd toe         HISTORY of PRESENT ILLNESS HPI     Hossein Tobin is a 66 y.o. female who presents today for wound/ulcer evaluation. History of Wound Context: Non-healing ischemic ulcer secondary to amputation of right 3rd toe  Wound/Ulcer Pain Timing/Severity: none, intermittent  Quality of pain: throbbing  Severity:  3 / 10   Modifying Factors: Pain worsens with walking  Associated Signs/Symptoms: drainage and pain    Ulcer Identification:  Ulcer Type: arterial, diabetic and non-healing surgical  Contributing Factors: diabetes, poor glucose control and arterial insufficiency    Wound: N/A        PAST MEDICAL HISTORY        Diagnosis Date    Asthma     CAD (coronary artery disease)     Colitis     Diabetes mellitus ([facility]     Hyperlipidemia     Hypertension     Prolonged emergence from general anesthesia     PVD (peripheral vascular disease) ([facility]     Thyroid disease        PAST SURGICAL HISTORY    Past Surgical History:   Procedure Laterality Date    CARDIAC SURGERY       SECTION      COLONOSCOPY N/A 2019    COLONOSCOPY DIAGNOSTIC performed by Arleen Linton MD at [address] [place] [address] GRAFT  2019    unknown vessels    HYSTERECTOMY      TOE AMPUTATION Right     3rd toe       FAMILY HISTORY    History reviewed. No pertinent family history.     SOCIAL HISTORY    Social History     Tobacco Use    Smoking status: Never Smoker    Smokeless tobacco: Never Used   Substance Use Topics    Alcohol use: Never     Frequency: Never    Drug use: Never       ALLERGIES    Allergies   Allergen Reactions    Ambien [Zolpidem Tartrate]     skin 3 times daily (with meals) 1 vial 3    insulin glargine (LANTUS) 100 UNIT/ML injection vial Inject 35 Units into the skin daily 1 vial 3    metoprolol succinate (TOPROL XL) 25 MG extended release tablet Take 1 tablet by mouth daily 30 tablet 3    metoprolol succinate (TOPROL XL) 50 MG extended release tablet Take 1 tablet by mouth daily 30 tablet 3    levothyroxine (SYNTHROID) 50 MCG tablet Take 50 mcg by mouth Daily      furosemide (LASIX) 40 MG tablet Take 40 mg by mouth 2 times daily      simvastatin (ZOCOR) 40 MG tablet Take 40 mg by mouth daily      meclizine (ANTIVERT) 25 MG tablet Take 25 mg by mouth three times daily      metFORMIN (GLUCOPHAGE) 1000 MG tablet Take 1,000 mg by mouth every 12 hours      montelukast (SINGULAIR) 10 MG tablet Take 10 mg by mouth daily       No current facility-administered medications on file prior to encounter. REVIEW OF SYSTEMS    Pertinent items are noted in HPI. Objective:      BP (!) 124/57   Pulse 64   Temp 98.4 °F (36.9 °C) (Temporal)   Resp 16   Ht 5' 4\" (1.626 m)   Wt 203 lb (92.1 kg)   BMI 34.84 kg/m²     Wt Readings from Last 3 Encounters:   08/30/19 203 lb (92.1 kg)   08/20/19 203 lb (92.1 kg)   08/09/19 201 lb (91.2 kg)       PHYSICAL EXAM    Constitutional:   Well nourished and well developed. Appears neat and clean. Patient is alert, oriented x3, and in no apparent distress. Respiratory:  Respiratory effort is easy and symmetric bilaterally. Rate is normal at rest and on room air. Vascular:  Pedal Pulses is not palpable and audible with doppler. Capillary refill is <3 sec to digits bilateral.  Extremities negative for pitting edema. Neurological:   Sensation is diminished to lower extremities. Dermatological:  Wound description noted in wound assessment. The wound(s) are  90% slough and 10 %  granulation    Psychiatric:  Judgement and insight intact. Short and long term memory intact.   No evidence of depression,

## 2019-09-06 ENCOUNTER — HOSPITAL ENCOUNTER (OUTPATIENT)
Dept: WOUND CARE | Age: 64
Discharge: HOME | End: 2019-09-06
Payer: COMMERCIAL

## 2019-09-06 VITALS
WEIGHT: 203 LBS | HEART RATE: 78 BPM | RESPIRATION RATE: 16 BRPM | HEIGHT: 64 IN | DIASTOLIC BLOOD PRESSURE: 61 MMHG | TEMPERATURE: 98.5 F | BODY MASS INDEX: 34.66 KG/M2 | SYSTOLIC BLOOD PRESSURE: 135 MMHG

## 2019-09-06 DIAGNOSIS — L97.512 RIGHT FOOT ULCER, WITH FAT LAYER EXPOSED (HCC): ICD-10-CM

## 2019-09-06 PROCEDURE — 11042 DBRDMT SUBQ TIS 1ST 20SQCM/<: CPT

## 2019-09-06 RX ORDER — LIDOCAINE 50 MG/G
1 PATCH TOPICAL DAILY
Qty: 30 PATCH | Refills: 0 | Status: SHIPPED | OUTPATIENT
Start: 2019-09-06 | End: 2019-10-06

## 2019-09-06 ASSESSMENT — PAIN DESCRIPTION - DESCRIPTORS: DESCRIPTORS: ACHING

## 2019-09-06 ASSESSMENT — PAIN SCALES - GENERAL: PAINLEVEL_OUTOF10: 5

## 2019-09-06 ASSESSMENT — PAIN DESCRIPTION - LOCATION: LOCATION: FOOT

## 2019-09-06 ASSESSMENT — PAIN DESCRIPTION - PAIN TYPE: TYPE: ACUTE PAIN

## 2019-09-06 ASSESSMENT — PAIN DESCRIPTION - ORIENTATION: ORIENTATION: RIGHT

## 2019-09-06 ASSESSMENT — PAIN DESCRIPTION - FREQUENCY: FREQUENCY: INTERMITTENT

## 2019-09-06 NOTE — PROGRESS NOTES
times daily (with meals) 1 vial 3    insulin glargine (LANTUS) 100 UNIT/ML injection vial Inject 35 Units into the skin daily 1 vial 3    metoprolol succinate (TOPROL XL) 25 MG extended release tablet Take 1 tablet by mouth daily 30 tablet 3    metoprolol succinate (TOPROL XL) 50 MG extended release tablet Take 1 tablet by mouth daily 30 tablet 3    levothyroxine (SYNTHROID) 50 MCG tablet Take 50 mcg by mouth Daily      furosemide (LASIX) 40 MG tablet Take 40 mg by mouth 2 times daily      simvastatin (ZOCOR) 40 MG tablet Take 40 mg by mouth daily      meclizine (ANTIVERT) 25 MG tablet Take 25 mg by mouth three times daily      metFORMIN (GLUCOPHAGE) 1000 MG tablet Take 1,000 mg by mouth every 12 hours      montelukast (SINGULAIR) 10 MG tablet Take 10 mg by mouth daily       No current facility-administered medications on file prior to encounter. REVIEW OF SYSTEMS    Pertinent items are noted in HPI. Objective:      /61   Pulse 78   Temp 98.5 °F (36.9 °C) (Temporal)   Resp 16   Ht 5' 4\" (1.626 m)   Wt 203 lb (92.1 kg)   BMI 34.84 kg/m²     Wt Readings from Last 3 Encounters:   09/06/19 203 lb (92.1 kg)   08/30/19 203 lb (92.1 kg)   08/20/19 203 lb (92.1 kg)       PHYSICAL EXAM  General Appearance: alert and oriented to person, place and time, well-developed and well-nourished, in no acute distress  Cardiovascular: Weak/absent pedal pulses, right foot. Extremities: no cyanosis and no clubbing  Musculoskeletal: s/p right 3rd toe amputation. Neurologic: Protective sensation is absent to the right foot.     Assessment:     Active Hospital Problems    Diagnosis Date Noted    Right foot ulcer, with fat layer exposed ([facility] [L97.512] 09/06/2019    Surgical wound dehiscence, initial encounter Magdaleno Aldana 07/26/2019    S/P amputation of lesser toe, right ([facility] [Z89.421] 07/26/2019    Atherosclerotic PVD with intermittent claudication ([facility] [I70.219] 04/30/2019    Type 2 diabetes mellitus

## 2019-09-12 ENCOUNTER — OFFICE VISIT (OUTPATIENT)
Dept: GASTROENTEROLOGY | Age: 64
End: 2019-09-12
Payer: COMMERCIAL

## 2019-09-12 VITALS
HEART RATE: 79 BPM | HEIGHT: 64 IN | RESPIRATION RATE: 18 BRPM | OXYGEN SATURATION: 98 % | WEIGHT: 210 LBS | BODY MASS INDEX: 35.85 KG/M2

## 2019-09-12 DIAGNOSIS — K62.5 RECTAL BLEEDING: Primary | ICD-10-CM

## 2019-09-12 PROCEDURE — G8427 DOCREV CUR MEDS BY ELIG CLIN: HCPCS | Performed by: SPECIALIST

## 2019-09-12 PROCEDURE — 1036F TOBACCO NON-USER: CPT | Performed by: SPECIALIST

## 2019-09-12 PROCEDURE — 99212 OFFICE O/P EST SF 10 MIN: CPT | Performed by: SPECIALIST

## 2019-09-12 PROCEDURE — G8598 ASA/ANTIPLAT THER USED: HCPCS | Performed by: SPECIALIST

## 2019-09-12 PROCEDURE — 3017F COLORECTAL CA SCREEN DOC REV: CPT | Performed by: SPECIALIST

## 2019-09-12 PROCEDURE — G8417 CALC BMI ABV UP PARAM F/U: HCPCS | Performed by: SPECIALIST

## 2019-09-12 ASSESSMENT — ENCOUNTER SYMPTOMS
ANAL BLEEDING: 0
GASTROINTESTINAL NEGATIVE: 1
NAUSEA: 0
CONSTIPATION: 0
BLOOD IN STOOL: 0
ABDOMINAL DISTENTION: 0
DIARRHEA: 0
EYES NEGATIVE: 1
RECTAL PAIN: 0
ABDOMINAL PAIN: 0
RESPIRATORY NEGATIVE: 1
VOMITING: 0

## 2019-09-12 NOTE — PROGRESS NOTES
Gastroenterology Clinic Follow up Visit    Charly Chandler  [id number]  Chief Complaint   Patient presents with    Follow-up       HPI and A/P at last visit summarized below:  Patient is here for follow-up. Reports no further bleeding, colonoscopy was unremarkable. Review of Systems   Constitutional: Negative. HENT: Negative. Eyes: Negative. Respiratory: Negative. Cardiovascular: Negative. Gastrointestinal: Negative. Negative for abdominal distention, abdominal pain, anal bleeding, blood in stool, constipation, diarrhea, nausea, rectal pain and vomiting. Endocrine: Negative. Genitourinary: Negative. Musculoskeletal: Negative. Skin: Negative. Allergic/Immunologic: Negative for food allergies. Neurological: Negative. Hematological: Negative. Psychiatric/Behavioral: Negative. Past medical history, past surgical history, medication list, social and familyhistory reviewed    Pulse 79, resp. rate 18, height 5' 4\" (1.626 m), weight 210 lb (95.3 kg), SpO2 98 %. Physical Exam   Constitutional: She appears well-developed and well-nourished. HENT:   Head: Normocephalic and atraumatic. Eyes: Pupils are equal, round, and reactive to light. Conjunctivae and EOM are normal.   Neck: Normal range of motion. Cardiovascular: Normal rate. Pulmonary/Chest: Effort normal.   Abdominal: Soft. Bowel sounds are normal.   Musculoskeletal: Normal range of motion. Neurological: She is alert. Skin: Skin is warm. Psychiatric: She has a normal mood and affect. Laboratory, Pathology, Radiology reviewed in detail with relevantimportant investigations summarized below:    No results for input(s): WBC, HGB, HCT, MCV, PLT in the last 720 hours. Lab Results   Component Value Date    ALT 9 05/30/2019    AST 12 05/30/2019    ALKPHOS 71 05/30/2019    BILITOT 0.3 05/30/2019     No results found. Endoscopic investigations:     Assessment and Plan:  Charly Chandler 66 y.o. female for follow up. No active GI issues. See PRN. Diagnosis Orders   1. Rectal bleeding         Return if symptoms worsen or fail to improve. Edna Kaplan MD   StaffGastroenterologist  [facility]    Please note this report has been partially produced using speech recognitionsoftware  and may cause contain errors related to that system including grammar, punctuation and spelling as well as words andphrases that may seem inappropriate. If there are questions or concerns please feel free to contact me to clarify.

## 2019-09-17 ENCOUNTER — HOSPITAL ENCOUNTER (OUTPATIENT)
Dept: WOUND CARE | Age: 64
Discharge: HOME | End: 2019-09-17
Payer: COMMERCIAL

## 2019-09-17 VITALS
DIASTOLIC BLOOD PRESSURE: 54 MMHG | SYSTOLIC BLOOD PRESSURE: 111 MMHG | RESPIRATION RATE: 16 BRPM | TEMPERATURE: 98.1 F | HEART RATE: 62 BPM

## 2019-09-17 DIAGNOSIS — Z79.4 TYPE 2 DIABETES MELLITUS WITH HYPERGLYCEMIA, WITH LONG-TERM CURRENT USE OF INSULIN (HCC): Primary | ICD-10-CM

## 2019-09-17 DIAGNOSIS — L97.509 NON-PRESSURE ULCER OF TOE (HCC): ICD-10-CM

## 2019-09-17 DIAGNOSIS — E11.65 TYPE 2 DIABETES MELLITUS WITH HYPERGLYCEMIA, WITH LONG-TERM CURRENT USE OF INSULIN (HCC): Primary | ICD-10-CM

## 2019-09-17 DIAGNOSIS — Z89.421 S/P AMPUTATION OF LESSER TOE, RIGHT (HCC): Chronic | ICD-10-CM

## 2019-09-17 DIAGNOSIS — I70.219 ATHEROSCLEROTIC PVD WITH INTERMITTENT CLAUDICATION (HCC): Chronic | ICD-10-CM

## 2019-09-17 PROCEDURE — 87077 CULTURE AEROBIC IDENTIFY: CPT

## 2019-09-17 PROCEDURE — 87186 SC STD MICRODIL/AGAR DIL: CPT

## 2019-09-17 PROCEDURE — 87070 CULTURE OTHR SPECIMN AEROBIC: CPT

## 2019-09-17 PROCEDURE — 99213 OFFICE O/P EST LOW 20 MIN: CPT

## 2019-09-17 PROCEDURE — 87205 SMEAR GRAM STAIN: CPT

## 2019-09-17 ASSESSMENT — PAIN DESCRIPTION - ONSET: ONSET: GRADUAL

## 2019-09-17 ASSESSMENT — PAIN DESCRIPTION - DESCRIPTORS: DESCRIPTORS: THROBBING

## 2019-09-17 ASSESSMENT — PAIN DESCRIPTION - FREQUENCY: FREQUENCY: INTERMITTENT

## 2019-09-17 ASSESSMENT — PAIN SCALES - GENERAL: PAINLEVEL_OUTOF10: 7

## 2019-09-17 ASSESSMENT — PAIN DESCRIPTION - ORIENTATION: ORIENTATION: RIGHT

## 2019-09-17 ASSESSMENT — PAIN DESCRIPTION - LOCATION: LOCATION: FOOT

## 2019-09-17 ASSESSMENT — PAIN DESCRIPTION - PAIN TYPE: TYPE: ACUTE PAIN

## 2019-09-17 NOTE — PROGRESS NOTES
cm^2 9/17/2019 10:18 AM   Change in Wound Size % (l*w) -200 9/17/2019 10:18 AM   Wound Volume (cm^3) 1.2 cm^3 9/17/2019 10:18 AM   Wound Healing % -650 9/17/2019 10:18 AM   Post-Procedure Length (cm) 1.7 cm 9/6/2019 11:07 AM   Post-Procedure Width (cm) 2 cm 9/6/2019 11:07 AM   Post-Procedure Depth (cm) 0.7 cm 9/6/2019 11:07 AM   Post-Procedure Surface Area (cm^2) 3.4 cm^2 9/6/2019 11:07 AM   Post-Procedure Volume (cm^3) 2.38 cm^3 9/6/2019 11:07 AM   Undermining Starts ___ O'Clock 12 8/9/2019 11:23 AM   Undermining Ends___ O'Clock 5 8/9/2019 11:23 AM   Undermining Maxium Distance (cm) 0.3 8/9/2019 11:23 AM   Drainage Amount Small 9/17/2019 10:18 AM   Drainage Description Serous 9/17/2019 10:18 AM   Odor None 9/17/2019 10:18 AM   Margins Defined edges 9/17/2019 10:18 AM   Florencia-wound Assessment Calloused;Dry 9/17/2019 10:18 AM   Non-staged Wound Description Full thickness 9/17/2019 10:18 AM   [place]%Wound Bed 20 7/26/2019 11:29 AM   Red%Wound Bed 10 9/17/2019 10:18 AM   Yellow%Wound Bed 90 9/17/2019 10:18 AM   Black%Wound Bed 40 5/14/2019  9:58 AM   Other%Wound Bed 100 white 4/30/2019  9:07 AM   Number of days: 146         Problem List Items Addressed This Visit     None            Plan:   Patient examined treatment plan explained via interpretation phone. [phone]  Referral to Pain Mgt and HBOT  HYdrogel with lidocaine ordered  Follow up in 2 weeks      Treatment Note please see attached Discharge Instructions    Written patient dismissal instructions given to patient and signed by patient or POA.          Discharge Instructions       [facility] Orders and Discharge Instructions  [address]  Telephone: [phone]      [phone]        NAME: Flavia Seymour  DATE OF BIRTH:  1955  MEDICAL RECORD NUMBER:  [id number]     Home Care/Facility: [address] [place] [address]     Wound Location:  RIGHT THIRD TOE AMPUTATION SITE     Dressing orders: 1. Cleanse wound(s) with normal saline. 2. Apply REGENECARE HA TO WOUND BED. EXTRA SENT WITH PATIENT - PLEASE ORDER AS NECESSARY  3. Cover with 4x4's and wrap with gauze (makenzie or kerlix)  4. Change  Every other day or Monday, Wednesday, and Friday     Compression:  NONE TO RIGHT LEG     Offloading Device:  PATIENT TO WEAR OFFLOADING SHOE      Other Instructions: DR. VILLALPANDO TO MAKE REFERRAL TO PAIN MANAGEMENT AND TO [facility]ERAP[facility]. PATIENT MAY AMBULATE.      Keep all dressings clean, dry and intact.  Keep pressure off the wound(s) at all times.      Follow up visit   2 weeks  October 1, 2019  AT     Please give 24 hour notice if unable to keep appointment. [phone]     If you experience any of the following, please call the Wound Care Service at  [phone] or go to the nearest emergency room.     *Increase in pain         *Temperature over 101           *Increase in drainage from your wound or a foul odor  *Uncontrolled swelling            *Need for compression bandage changes due to slippage, breakthrough drainage     PLEASE NOTE: IF YOU ARE UNABLE TO OBTAIN WOUND SUPPLIES, CONTINUE TO USE THE SUPPLIES YOU HAVE AVAILABLE UNTIL YOU ARE ABLE TO [address].  IT IS MOST IMPORTANT TO KEEP THE WOUND COVERED AT ALL TIMES  Electronically signed by Brendon Sargent DPM on 9/17/2019 at 11:14 AM        Electronically signed by Brendon Sargent DPM on 9/17/2019 at 11:16 AM

## 2019-09-17 NOTE — PLAN OF CARE
Problem: Pain:  Goal: Pain level will decrease  Description  Pain level will decrease  Outcome: Ongoing  Goal: Control of acute pain  Description  Control of acute pain  Outcome: Ongoing  Goal: Control of chronic pain  Description  Control of chronic pain  Outcome: Ongoing     Problem: Pain:  Goal: Pain level will decrease  Description  Pain level will decrease  Outcome: Ongoing  Goal: Control of acute pain  Description  Control of acute pain  Outcome: Ongoing  Goal: Control of chronic pain  Description  Control of chronic pain  Outcome: Ongoing     Problem: Wound:  Goal: Will show signs of wound healing; wound closure and no evidence of infection  Description  Will show signs of wound healing; wound closure and no evidence of infection  Outcome: Ongoing

## 2019-09-20 ENCOUNTER — HOSPITAL ENCOUNTER (EMERGENCY)
Age: 64
Discharge: HOME | End: 2019-09-20
Attending: EMERGENCY MEDICINE
Payer: COMMERCIAL

## 2019-09-20 VITALS
SYSTOLIC BLOOD PRESSURE: 136 MMHG | WEIGHT: 214 LBS | HEART RATE: 88 BPM | DIASTOLIC BLOOD PRESSURE: 70 MMHG | RESPIRATION RATE: 18 BRPM | TEMPERATURE: 98.2 F | OXYGEN SATURATION: 100 % | BODY MASS INDEX: 36.73 KG/M2

## 2019-09-20 DIAGNOSIS — Z51.89 ENCOUNTER FOR WOUND RE-CHECK: Primary | ICD-10-CM

## 2019-09-20 PROCEDURE — 87186 SC STD MICRODIL/AGAR DIL: CPT

## 2019-09-20 PROCEDURE — 87075 CULTR BACTERIA EXCEPT BLOOD: CPT

## 2019-09-20 PROCEDURE — 87070 CULTURE OTHR SPECIMN AEROBIC: CPT

## 2019-09-20 PROCEDURE — 87077 CULTURE AEROBIC IDENTIFY: CPT

## 2019-09-20 PROCEDURE — 6370000000 HC RX 637 (ALT 250 FOR IP): Performed by: EMERGENCY MEDICINE

## 2019-09-20 PROCEDURE — 87205 SMEAR GRAM STAIN: CPT

## 2019-09-20 PROCEDURE — 99282 EMERGENCY DEPT VISIT SF MDM: CPT

## 2019-09-20 RX ORDER — HYDROCODONE BITARTRATE AND ACETAMINOPHEN 5; 325 MG/1; MG/1
1 TABLET ORAL EVERY 6 HOURS PRN
Qty: 12 TABLET | Refills: 0 | Status: SHIPPED | OUTPATIENT
Start: 2019-09-20 | End: 2019-09-23

## 2019-09-20 RX ORDER — SULFAMETHOXAZOLE AND TRIMETHOPRIM 800; 160 MG/1; MG/1
1 TABLET ORAL ONCE
Status: COMPLETED | OUTPATIENT
Start: 2019-09-20 | End: 2019-09-20

## 2019-09-20 RX ORDER — ATORVASTATIN CALCIUM 40 MG/1
40 TABLET, FILM COATED ORAL DAILY
Status: ON HOLD | COMMUNITY
End: 2020-04-28 | Stop reason: SDUPTHER

## 2019-09-20 RX ORDER — CIPROFLOXACIN 500 MG/1
500 TABLET, FILM COATED ORAL ONCE
Status: COMPLETED | OUTPATIENT
Start: 2019-09-20 | End: 2019-09-20

## 2019-09-20 RX ORDER — HYDROCODONE BITARTRATE AND ACETAMINOPHEN 5; 325 MG/1; MG/1
1 TABLET ORAL ONCE
Status: COMPLETED | OUTPATIENT
Start: 2019-09-20 | End: 2019-09-20

## 2019-09-20 RX ORDER — CIPROFLOXACIN 500 MG/1
500 TABLET, FILM COATED ORAL 2 TIMES DAILY
Qty: 20 TABLET | Refills: 0 | Status: SHIPPED | OUTPATIENT
Start: 2019-09-20 | End: 2019-09-30

## 2019-09-20 RX ADMIN — HYDROCODONE BITARTRATE AND ACETAMINOPHEN 1 TABLET: 5; 325 TABLET ORAL at 21:43

## 2019-09-20 RX ADMIN — SULFAMETHOXAZOLE AND TRIMETHOPRIM 1 TABLET: 800; 160 TABLET ORAL at 21:43

## 2019-09-20 RX ADMIN — CIPROFLOXACIN 500 MG: 500 TABLET, FILM COATED ORAL at 21:43

## 2019-09-20 ASSESSMENT — PAIN DESCRIPTION - ORIENTATION: ORIENTATION: RIGHT

## 2019-09-20 ASSESSMENT — ENCOUNTER SYMPTOMS
VOMITING: 0
SHORTNESS OF BREATH: 0
COUGH: 0
EYE DISCHARGE: 0
CHEST TIGHTNESS: 0
ABDOMINAL PAIN: 0
SORE THROAT: 0
WHEEZING: 0
PHOTOPHOBIA: 0
ABDOMINAL DISTENTION: 0

## 2019-09-20 ASSESSMENT — PAIN SCALES - GENERAL
PAINLEVEL_OUTOF10: 9
PAINLEVEL_OUTOF10: 9

## 2019-09-20 ASSESSMENT — PAIN DESCRIPTION - DESCRIPTORS: DESCRIPTORS: SHARP;CONSTANT

## 2019-09-20 ASSESSMENT — PAIN DESCRIPTION - PAIN TYPE: TYPE: ACUTE PAIN

## 2019-09-20 ASSESSMENT — PAIN DESCRIPTION - LOCATION: LOCATION: TOE (COMMENT WHICH ONE)

## 2019-09-21 NOTE — ED NOTES
Per . Patient states \" she has had very bad pain since  Amputated her toe on the right foot. Patient sees Dr. Gina Cummings her at [address] [facility] and has told her many times about the pain but Dr. Gina Cummings will not give her pain medications. Patient states she takes tylenol but it [place] not work. Patient states Dr. Eze Lazaro not given her pain medications or antibiotics. \"     Alison Arroyo RN  09/20/19 2040

## 2019-09-21 NOTE — ED NOTES
Discharge insructions given and reviewed via . Patient verbalized understanding. Patient ambulated out of ED with a steady gait using a rollator.      Daniel Frias RN  09/20/19 [id number]

## 2019-09-21 NOTE — ED PROVIDER NOTES
skin 3 times daily (with meals)    IRON POLYSACCH VOPMT-I22-YP (NIFEREX-150 FORTE PO)    Take by mouth    LEVOTHYROXINE (SYNTHROID) 50 MCG TABLET    Take 50 mcg by mouth Daily    LIDOCAINE (LIDODERM) 5 %    Place 1 patch onto the skin daily 12 hours on, 12 hours off. LISINOPRIL (PRINIVIL;ZESTRIL) 10 MG TABLET    Take 5 mg by mouth daily     MECLIZINE (ANTIVERT) 25 MG TABLET    Take 25 mg by mouth three times daily    MELATONIN 3 MG TABS TABLET    Take 3 mg by mouth daily Indications: 2 pills    METFORMIN (GLUCOPHAGE) 1000 MG TABLET    Take 1,000 mg by mouth every 12 hours    METOPROLOL SUCCINATE (TOPROL XL) 25 MG EXTENDED RELEASE TABLET    Take 1 tablet by mouth daily    METOPROLOL SUCCINATE (TOPROL XL) 50 MG EXTENDED RELEASE TABLET    Take 1 tablet by mouth daily    SERTRALINE (ZOLOFT) 50 MG TABLET    Take 1 tablet by mouth daily    SILVER SULFADIAZINE (SILVADENE) 1 % CREAM    Apply 1 % topically 2 times daily Indications: to bilateral feet Apply topically daily. SODIUM CHLORIDE, EXTERNAL, 0.9 % SOLN    Apply 1 Applicatorful topically daily    SODIUM CHLORIDE, EXTERNAL, 0.9 % SOLN    Apply 1 Applicatorful topically daily    SODIUM POLYSTYRENE (SPS) 15 GM/60ML SUSPENSION    Take 60 mLs by mouth 2 times daily for 2 doses       ALLERGIES     Ambien [zolpidem tartrate]; Capoten [captopril]; Clioquinol; Cogentin [benztropine]; Depakote [divalproex sodium]; Effexor xr [venlafaxine hcl er]; Geodon [ziprasidone hcl]; Navane [thiothixene]; Pamelor [nortriptyline hcl]; Remeron [mirtazapine]; Risperdal [risperidone]; Trazodone and nefazodone; and Wellbutrin [bupropion]    FAMILY HISTORY     History reviewed. No pertinent family history.        SOCIAL HISTORY       Social History     Socioeconomic History    Marital status:      Spouse name: None    Number of children: None    Years of education: None    Highest education level: None   Occupational History    None   Social Needs    Financial resource strain:

## 2019-09-23 LAB
ANAEROBIC CULTURE: ABNORMAL
GRAM STAIN RESULT: ABNORMAL
ORGANISM: ABNORMAL
WOUND/ABSCESS: ABNORMAL

## 2019-09-24 ENCOUNTER — HOSPITAL ENCOUNTER (OUTPATIENT)
Dept: WOUND CARE | Age: 64
Discharge: HOME | End: 2019-09-24
Payer: COMMERCIAL

## 2019-09-24 VITALS
SYSTOLIC BLOOD PRESSURE: 121 MMHG | RESPIRATION RATE: 16 BRPM | TEMPERATURE: 98.3 F | DIASTOLIC BLOOD PRESSURE: 65 MMHG | HEART RATE: 61 BPM

## 2019-09-24 PROCEDURE — 99213 OFFICE O/P EST LOW 20 MIN: CPT

## 2019-09-24 ASSESSMENT — PAIN DESCRIPTION - DESCRIPTORS: DESCRIPTORS: DULL

## 2019-09-24 ASSESSMENT — PAIN DESCRIPTION - ONSET: ONSET: GRADUAL

## 2019-09-24 ASSESSMENT — PAIN SCALES - GENERAL: PAINLEVEL_OUTOF10: 8

## 2019-09-24 ASSESSMENT — PAIN DESCRIPTION - ORIENTATION: ORIENTATION: RIGHT

## 2019-09-24 ASSESSMENT — PAIN DESCRIPTION - PROGRESSION: CLINICAL_PROGRESSION: GRADUALLY WORSENING

## 2019-09-24 ASSESSMENT — PAIN - FUNCTIONAL ASSESSMENT: PAIN_FUNCTIONAL_ASSESSMENT: PREVENTS OR INTERFERES SOME ACTIVE ACTIVITIES AND ADLS

## 2019-09-24 ASSESSMENT — PAIN DESCRIPTION - FREQUENCY: FREQUENCY: INTERMITTENT

## 2019-09-24 ASSESSMENT — PAIN DESCRIPTION - PAIN TYPE: TYPE: ACUTE PAIN

## 2019-09-24 ASSESSMENT — PAIN DESCRIPTION - LOCATION: LOCATION: FOOT

## 2019-09-24 NOTE — PROGRESS NOTES
deteriorating in size and depth. The wound is dry. The base of the wound has slough and necrotic tissue noted. No purulence, surrounding erythema, edema, or warmth were noted. Patient denies any N/V/F/C or signs of systemic infection. She states her pain is 8/10 and is tearful. Psychiatric:  Judgement and insight intact. Short and long term memory intact. No evidence of depression, anxiety, or agitation. Patient is calm, cooperative, and communicative. Appropriate interactions and affect. Assessment:      Patient Active Problem List   Diagnosis Code    Severe major depression with psychotic features ([address] F32.3    Type 2 diabetes mellitus with hyperglycemia, with long-term current use of insulin (HCC) E11.65, Z79.4    HTN (hypertension) I10    HLD (hyperlipidemia) E78.5    Hypothyroid E03.9    HF (heart failure) ([address] I50.9    Non-pressure ulcer of toe ([address] L97.509    Atherosclerotic PVD with intermittent claudication ([address] I70.219    Acute osteomyelitis ([address] M86.10    Skin infection L08.9    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 A49.8    Surgical wound dehiscence, initial encounter T81.31XA    S/P amputation of lesser toe, right ([address] Z89.421    Rectal bleeding K62.5    Right foot ulcer, with fat layer exposed ([address] L97.512        Procedure Note  Indications:  Based on my examination of this patient's wound(s)/ulcer(s) today, debridement is not required to prepare the wound bed today for application of a skin substitute.   Performed by: Sophia Wright DPM  Wound 04/23/19 Toe (Comment  which one) Right #1. 3rd toe (Active)   Wound Image   9/24/2019  2:16 PM   Wound Non-Healing Surgical 9/24/2019  2:16 PM   Offloading for Diabetic Foot Ulcers Post op shoe 9/17/2019 11:38 AM   Dressing Status Changed 9/17/2019 11:38 AM   Dressing Changed Changed/New 9/17/2019 11:38 AM   Dressing/Treatment Antibacterial Ointment;Dry Dressing 5/14/2019 11:06 AM   Wound Cleansed Rinsed/Irrigated  [id number]  Telephone: [phone]      [phone]        NAME: Kenny Mcardle  DATE OF BIRTH:  1955  MEDICAL RECORD NUMBER:  [id number]     Home Care/Facility: [address] [place] [address]     Wound Location:  RIGHT THIRD TOE AMPUTATION SITE     Dressing orders: 1. Cleanse wound(s) with normal saline. 2. Apply REGENECARE HA TO WOUND BED - PLEASE ORDER AS NECESSARY  3. Cover with 4x4's and wrap with gauze (makenzie or kerlix)  4. Change  Every other day or Monday, Wednesday, and Friday     Compression:  NONE TO RIGHT LEG     Offloading Device:  PATIENT TO WEAR OFFLOADING SHOE      Other Instructions:  PATIENT MAY AMBULATE.      Keep all dressings clean, dry and intact.  Keep pressure off the wound(s) at all times.      Follow up visit   Friday September 27, 2019 AT 4:15PM  WITH DR. Adriel Samuels     Please give 24 hour notice if unable to keep appointment. [phone]     If you experience any of the following, please call the Wound Care Service at  [phone] or go to the nearest emergency room.     *Increase in pain         *Temperature over 101           *Increase in drainage from your wound or a foul odor  *Uncontrolled swelling            *Need for compression bandage changes due to slippage, breakthrough drainage     PLEASE NOTE: IF YOU ARE UNABLE TO OBTAIN WOUND SUPPLIES, CONTINUE TO USE THE SUPPLIES YOU HAVE AVAILABLE UNTIL YOU ARE ABLE TO [address].  IT IS MOST IMPORTANT TO KEEP THE WOUND COVERED AT ALL TIMES  Electronically signed by Brendon Sargent DPM on 9/24/2019 at 3:05 PM        Electronically signed by Brendon Sargent DPM on 9/24/2019 at 3:07 PM

## 2019-09-27 ENCOUNTER — HOSPITAL ENCOUNTER (OUTPATIENT)
Dept: GENERAL RADIOLOGY | Age: 64
Discharge: HOME | End: 2019-09-29
Payer: COMMERCIAL

## 2019-09-27 ENCOUNTER — HOSPITAL ENCOUNTER (OUTPATIENT)
Dept: WOUND CARE | Age: 64
Discharge: HOME | End: 2019-09-27
Payer: COMMERCIAL

## 2019-09-27 VITALS
HEART RATE: 59 BPM | SYSTOLIC BLOOD PRESSURE: 126 MMHG | DIASTOLIC BLOOD PRESSURE: 80 MMHG | TEMPERATURE: 97.8 F | RESPIRATION RATE: 16 BRPM

## 2019-09-27 DIAGNOSIS — L97.512 RIGHT FOOT ULCER, WITH FAT LAYER EXPOSED (HCC): ICD-10-CM

## 2019-09-27 DIAGNOSIS — Z91.89 AT RISK FOR INEFFECTIVE BREATHING: ICD-10-CM

## 2019-09-27 DIAGNOSIS — E11.621 DIABETIC ULCER OF RIGHT FOOT ASSOCIATED WITH TYPE 2 DIABETES MELLITUS, WITH MUSCLE INVOLVEMENT WITHOUT EVIDENCE OF NECROSIS, UNSPECIFIED PART OF FOOT (HCC): ICD-10-CM

## 2019-09-27 DIAGNOSIS — I70.235 ATHSCL NATIVE ARTERIES OF RIGHT LEG W ULCER OTH PRT FOOT (HCC): ICD-10-CM

## 2019-09-27 DIAGNOSIS — L97.515 DIABETIC ULCER OF RIGHT FOOT ASSOCIATED WITH TYPE 2 DIABETES MELLITUS, WITH MUSCLE INVOLVEMENT WITHOUT EVIDENCE OF NECROSIS, UNSPECIFIED PART OF FOOT (HCC): ICD-10-CM

## 2019-09-27 DIAGNOSIS — Z91.89 AT RISK FOR INEFFECTIVE BREATHING: Primary | ICD-10-CM

## 2019-09-27 LAB
ANION GAP SERPL CALCULATED.3IONS-SCNC: 10 MEQ/L (ref 9–15)
BUN BLDV-MCNC: 54 MG/DL (ref 8–23)
CALCIUM SERPL-MCNC: 9.5 MG/DL (ref 8.5–9.9)
CHLORIDE BLD-SCNC: 93 MEQ/L (ref 95–107)
CO2: 26 MEQ/L (ref 20–31)
CREAT SERPL-MCNC: 1.55 MG/DL (ref 0.5–0.9)
GFR AFRICAN AMERICAN: 41
GFR NON-AFRICAN AMERICAN: 33.9
GLUCOSE BLD-MCNC: 93 MG/DL (ref 70–99)
HCT VFR BLD CALC: 33 % (ref 37–47)
HEMOGLOBIN: 10.7 G/DL (ref 12–16)
MCH RBC QN AUTO: 22.9 PG (ref 27–31.3)
MCHC RBC AUTO-ENTMCNC: 32.3 % (ref 33–37)
MCV RBC AUTO: 70.8 FL (ref 82–100)
PDW BLD-RTO: 20.3 % (ref 11.5–14.5)
PLATELET # BLD: 307 K/UL (ref 130–400)
POTASSIUM SERPL-SCNC: 6.2 MEQ/L (ref 3.4–4.9)
RBC # BLD: 4.66 M/UL (ref 4.2–5.4)
SODIUM BLD-SCNC: 129 MEQ/L (ref 135–144)
WBC # BLD: 9.2 K/UL (ref 4.8–10.8)

## 2019-09-27 PROCEDURE — 71045 X-RAY EXAM CHEST 1 VIEW: CPT

## 2019-09-27 PROCEDURE — 99213 OFFICE O/P EST LOW 20 MIN: CPT

## 2019-09-27 PROCEDURE — 99204 OFFICE O/P NEW MOD 45 MIN: CPT | Performed by: SURGERY

## 2019-09-27 ASSESSMENT — PAIN SCALES - GENERAL: PAINLEVEL_OUTOF10: 0

## 2019-09-27 NOTE — PROGRESS NOTES
SODIUM CHLORIDE, EXTERNAL, 0.9 % SOLN Apply 1 Applicatorful topically daily 1000 mL 2    collagenase (SANTYL) 250 UNIT/GM ointment Apply topically daily.  30 g 2    ARIPiprazole (ABILIFY) 10 MG tablet Take 5 mg by mouth daily       aspirin 81 MG chewable tablet Take 81 mg by mouth daily      folic acid (FOLVITE) 1 MG tablet Take 1 mg by mouth daily      melatonin 3 MG TABS tablet Take 3 mg by mouth daily Indications: 2 pills      Iron Polysacch Ttwup-O61-JS (NIFEREX-150 FORTE PO) Take by mouth      clopidogrel (PLAVIX) 75 MG tablet Take 75 mg by mouth daily      Cyanocobalamin (VITAMIN B-12) 1000 MCG extended release tablet Take 1,000 mcg by mouth daily      Cholecalciferol (VITAMIN D3) 96448 units CAPS Take by mouth Indications: weekly on Sun      Cholecalciferol (VITAMIN D) 2000 units CAPS capsule Take by mouth      lisinopril (PRINIVIL;ZESTRIL) 10 MG tablet Take 5 mg by mouth daily       SODIUM CHLORIDE, EXTERNAL, 0.9 % SOLN Apply 1 Applicatorful topically daily 1000 mL 2    sodium polystyrene (SPS) 15 GM/60ML suspension Take 60 mLs by mouth 2 times daily for 2 doses 120 mL 0    sertraline (ZOLOFT) 50 MG tablet Take 1 tablet by mouth daily 30 tablet 0    insulin lispro (HUMALOG) 100 UNIT/ML injection vial Inject 0-6 Units into the skin 3 times daily (with meals) 1 vial 3    insulin lispro (HUMALOG) 100 UNIT/ML injection vial Inject 5 Units into the skin 3 times daily (with meals) 1 vial 3    insulin glargine (LANTUS) 100 UNIT/ML injection vial Inject 35 Units into the skin daily 1 vial 3    metoprolol succinate (TOPROL XL) 25 MG extended release tablet Take 1 tablet by mouth daily 30 tablet 3    metoprolol succinate (TOPROL XL) 50 MG extended release tablet Take 1 tablet by mouth daily 30 tablet 3    levothyroxine (SYNTHROID) 50 MCG tablet Take 50 mcg by mouth Daily      furosemide (LASIX) 40 MG tablet Take 20 mg by mouth three times a week Indications: M-W-F       meclizine (ANTIVERT) 25 MG
signed by Melody oRwland MD on 9/27/2019 at 5:48 PM.

## 2019-09-27 NOTE — PLAN OF CARE
Hyperbaric Oxygen Therapy   Patient Orientation      NAME: Swapna Juarez  MEDICAL RECORD NUMBER:  [id number]  AGE: 66 y.o. GENDER: female  : 1955  EPISODE DATE:  2019    HBO Orientation Completed with:  [facility],[address] PATIENT CAREGIVER:[id number]}         INTRODUCTION   Welcome to the [address] [facility],[address]. This guide will assist in answering any questions which you might have concerning your scheduled hyperbaric oxygen treatment and if appropriate, any wound care you might need. During your stay with us you will hear your treatment called a dive. This is just a nickname given to the procedure and does not refer to being in water. You will only be exposed to air pressure changes during your treatments. HYPERBARIC OXYGEN THERAPY  Hyperbaric oxygen treatment is a means of providing additional oxygen to your body tissues. By increasing the oxygen in the tissues, healing is enhanced. Two important effects on difficult wounds are first, to speed new microscopic blood vessel growth into the wound and second, to improve the ability of your white blood cells to kill germs. It is important to know that hyperbaric oxygen is an additional (adjunctive) therapy in addition to the current medical or surgical care you are receiving. It is also essential that you understand that this is not a cure-all but a part of your total medical care. THE STAFF  [facility] staff consists of fully trained physicians, nursing staff, and chamber operators. There will always be a physician, as well as other staff members with you in the department while you are having your hyperbaric oxygen therapy treatment. Please feel free to ask for help from any of the staff members while you are here. THE CHAMBER  The hyperbaric chamber located at the [address] [place] [address] Road is a monoplace, seamless acrylic pressure chamber designed to treat one patient at a time.   You will be lying down with your head slightly elevated for your treatments. A communication system allows you to speak with the , family members or the physician. You will also be able to watch and listen to television during treatment. The monoplace chamber administers 100% oxygen at a physician prescribed pressure. Because you will be in an oxygen environment, a detailed list of safety precautions and guidelines will be strictly enforced for your safety. TREATMENT SCHEDULE  You will need to arrange for your own transportation. If there is difficulty, we will try to assist in making the necessary arrangements with an appropriate agency. Hyperbaric treatments are given daily, Monday through Friday. Each treatment will last almost two (2) hours. Be prepared to spend approximately three (3) hours at our facility. This allows time for preparing you for your treatment dive and the actual time in the chamber. It is essential that you try to make every scheduled treatment dive possible so that the effects of the hyperbaric oxygen will continue. Any long interruption could cause the healing enhancement to slow or even stop. If at any time you have chills, fever, nausea, vomiting, diarrhea or any problem with your glucose levels, please inform the physician or the nurse. You will be assessed to see if you should be in the chamber that day. You will receive a complete briefing by a staff member. Necessary forms and informed consents (permits) will need to be signed before treatments begin. You will also be given a tour of the area . VISITORS  Visitors are welcome and we encourage patients to bring their families. We ask that once your family has seen the facility that they remain in the waiting room to ensure your privacy, and the privacy of the other patients. Visitors are not allowed in the treatment area unless their presence is needed by the physician. Again, we welcome you to our facility.   Please let us know if there is anything Indications: M-W-F       meclizine (ANTIVERT) 25 MG tablet Take 25 mg by mouth three times daily      metFORMIN (GLUCOPHAGE) 1000 MG tablet Take 1,000 mg by mouth every 12 hours       No current facility-administered medications on file prior to encounter. LABS  HgBA1c:    Lab Results   Component Value Date    LABA1C 7.0 05/25/2019            Please add comments to any \"yes\" answers. Do you have a history of: Comments   Seizure {YES***/NO:60}   Congenital spherocytosis {YES***/NO:60}   Optic Neuritis {YES***/NO:60}   Cataracts {YES***/NO:60}   Eye Surgery {YES***/NO:60}   Ear problems {YES***/NO:60}   Ear reconstructive surgery {YES***/NO:60}   Sinus problems {YES***/NO:60}   Asthma {YES***/NO:60}   Bronchitis {YES***/NO:60}   Emphysema {YES***/NO:60}   Pneumothorax {YES***/NO:60}   Tuberculosis {YES***/NO:60}   Other lung problems {YES***/NO:60}   Hypertension {YES***/NO:60}   Pacemaker/AICD {YES***/NO:60}                                              Congestive heart failure {YES***/NO:60}   EF% >30% {YES***/NO:60}   Any implanted device {YES***/NO:60}                                               Dialysis {YES***/NO:60}   Current pregnancy {NO/YES:19833}   Claustrophobia {YES***/NO:60}   Diabetes {YES***/NO:60}   Currently using these medications:     a. Disulfiram (Antabuse®) {YES***/NO:60}    b. Mafenide acetate        (Sulfamylon®) {YES***/NO:60}    c.  Diuretics for CHF {YES***/NO:60}    d. Amiodarone dose > 400mg/day {YES***/NO:60}    e. Lanoxin/Digoxin {YES***/NO:60}    f. Current Steroid use     {YES***/NO:60}   Cancer:  {YES***/NO:60}    a. Surgery for Cancer {YES***/NO:60}    b. Radiation therapy {YES***/NO:60}    c. Chemotherapy {YES***/NO:60}    If yes, did you receive:     a. Doxorubicin (Adriamycin®) {NO/YES:19833}    b.   Cisplatin (Platinol AQ®) {NO/YES:19833}    c.  Bleomycin (Blenoxane®) {NO/YES:19833}     The above was reviewed with: [address] [place] [address],[address] PATIENT

## 2019-09-27 NOTE — PLAN OF CARE
Problem: Pain:  Goal: Pain level will decrease  Outcome: Ongoing  Goal: Control of chronic pain  Outcome: Ongoing     Problem: Wound:  Goal: Will show signs of wound healing; wound closure and no evidence of infection  Outcome: Ongoing     Problem: Blood Glucose:  Goal: Ability to maintain appropriate glucose levels will improve  Outcome: Ongoing

## 2019-09-28 ENCOUNTER — HOSPITAL ENCOUNTER (EMERGENCY)
Age: 64
Discharge: HOME | End: 2019-09-28
Payer: COMMERCIAL

## 2019-09-28 VITALS
RESPIRATION RATE: 20 BRPM | DIASTOLIC BLOOD PRESSURE: 52 MMHG | WEIGHT: 210 LBS | BODY MASS INDEX: 35.85 KG/M2 | OXYGEN SATURATION: 98 % | TEMPERATURE: 98.1 F | HEIGHT: 64 IN | SYSTOLIC BLOOD PRESSURE: 118 MMHG | HEART RATE: 83 BPM

## 2019-09-28 DIAGNOSIS — N18.9 CHRONIC KIDNEY DISEASE, UNSPECIFIED CKD STAGE: ICD-10-CM

## 2019-09-28 DIAGNOSIS — E87.5 HYPERKALEMIA: Primary | ICD-10-CM

## 2019-09-28 LAB
ANION GAP SERPL CALCULATED.3IONS-SCNC: 11 MEQ/L (ref 9–15)
BUN BLDV-MCNC: 56 MG/DL (ref 8–23)
CALCIUM SERPL-MCNC: 9.2 MG/DL (ref 8.5–9.9)
CHLORIDE BLD-SCNC: 95 MEQ/L (ref 95–107)
CO2: 25 MEQ/L (ref 20–31)
CREAT SERPL-MCNC: 1.65 MG/DL (ref 0.5–0.9)
EKG ATRIAL RATE: 76 BPM
EKG P AXIS: 7 DEGREES
EKG P-R INTERVAL: 182 MS
EKG Q-T INTERVAL: 386 MS
EKG QRS DURATION: 104 MS
EKG QTC CALCULATION (BAZETT): 434 MS
EKG R AXIS: 28 DEGREES
EKG T AXIS: 187 DEGREES
EKG VENTRICULAR RATE: 76 BPM
GFR AFRICAN AMERICAN: 38.2
GFR NON-AFRICAN AMERICAN: 31.5
GLUCOSE BLD-MCNC: 115 MG/DL (ref 70–99)
POTASSIUM SERPL-SCNC: 5.3 MEQ/L (ref 3.4–4.9)
SODIUM BLD-SCNC: 131 MEQ/L (ref 135–144)

## 2019-09-28 PROCEDURE — 99284 EMERGENCY DEPT VISIT MOD MDM: CPT

## 2019-09-28 PROCEDURE — 93005 ELECTROCARDIOGRAM TRACING: CPT | Performed by: NURSE PRACTITIONER

## 2019-09-28 PROCEDURE — 36415 COLL VENOUS BLD VENIPUNCTURE: CPT

## 2019-09-28 PROCEDURE — 80048 BASIC METABOLIC PNL TOTAL CA: CPT

## 2019-09-28 PROCEDURE — 6370000000 HC RX 637 (ALT 250 FOR IP): Performed by: NURSE PRACTITIONER

## 2019-09-28 RX ORDER — SODIUM POLYSTYRENE SULFONATE 15 G/60ML
15 SUSPENSION ORAL; RECTAL ONCE
Status: COMPLETED | OUTPATIENT
Start: 2019-09-28 | End: 2019-09-28

## 2019-09-28 RX ADMIN — SODIUM POLYSTYRENE SULFONATE 15 G: 15 SUSPENSION ORAL; RECTAL at 03:03

## 2019-09-28 ASSESSMENT — ENCOUNTER SYMPTOMS
RHINORRHEA: 0
COUGH: 0
PHOTOPHOBIA: 0
SORE THROAT: 0
EYE PAIN: 0
SHORTNESS OF BREATH: 0
NAUSEA: 0
BACK PAIN: 0
DIARRHEA: 0
ABDOMINAL PAIN: 0
VOMITING: 0

## 2019-09-28 NOTE — ED PROVIDER NOTES
[address] [facility] ED  EMERGENCY DEPARTMENT ENCOUNTER      Pt Name: Gayla Pabon  MRN: [id number]  [place] 1955  Date of evaluation: 9/28/2019  Provider: ESDRAS Judge CNP    CHIEF COMPLAINT       Chief Complaint   Patient presents with    Other     Dr Kai Stapleton called and advised to come in due to potassium is elevated         HISTORY OF PRESENT ILLNESS   (Location/Symptom, Timing/Onset,Context/Setting, Quality, Duration, Modifying Factors, Severity)  Note limiting factors. Gayla Pabon is a 66 y.o. female who presents to the emergency department after being sent by wound care provider for elevated potassium. She reports that she feels good and has no complaints. Location/Symptom -abnormal lab  Onset -today  Context/Setting - as above  Quality -elevated potassium  Duration -today  Modifying Factors -none obvious but sample is not known if it is hemolyzed  Severity -mild, she is asymptomatic        Nursing Notes were reviewed. REVIEW OF SYSTEMS    (2-9 systems for level 4, 10 or more for level 5)     Review of Systems   Constitutional: Negative for chills, diaphoresis, fatigue and fever. HENT: Negative for congestion, rhinorrhea and sore throat. Eyes: Negative for photophobia and pain. Respiratory: Negative for cough and shortness of breath. Cardiovascular: Negative for chest pain and palpitations. Gastrointestinal: Negative for abdominal pain, diarrhea, nausea and vomiting. Genitourinary: Negative for dysuria and flank pain. Musculoskeletal: Negative for back pain. Skin: Negative for rash. Neurological: Negative for dizziness, light-headedness and headaches. Psychiatric/Behavioral: Negative. All other systems reviewed and are negative. Except as noted above the remainder of the review of systems was reviewed and negative.        PAST MEDICAL HISTORY     Past Medical History:   Diagnosis Date    Asthma     CAD (coronary artery disease) (electronically signed)  Attending Emergency Physician          ESDRAS To CNP  09/28/19 4722

## 2019-10-01 PROCEDURE — 93010 ELECTROCARDIOGRAM REPORT: CPT | Performed by: INTERNAL MEDICINE

## 2019-10-04 ENCOUNTER — HOSPITAL ENCOUNTER (OUTPATIENT)
Dept: WOUND CARE | Age: 64
Discharge: HOME | End: 2019-10-04
Payer: COMMERCIAL

## 2019-10-04 VITALS
SYSTOLIC BLOOD PRESSURE: 87 MMHG | HEART RATE: 66 BPM | DIASTOLIC BLOOD PRESSURE: 47 MMHG | TEMPERATURE: 98.8 F | RESPIRATION RATE: 18 BRPM

## 2019-10-04 PROCEDURE — 99204 OFFICE O/P NEW MOD 45 MIN: CPT | Performed by: SURGERY

## 2019-10-04 PROCEDURE — 99213 OFFICE O/P EST LOW 20 MIN: CPT

## 2019-10-04 RX ORDER — GABAPENTIN 300 MG/1
300 CAPSULE ORAL 3 TIMES DAILY
COMMUNITY
End: 2020-02-07

## 2019-10-04 ASSESSMENT — PAIN DESCRIPTION - ORIENTATION: ORIENTATION: RIGHT

## 2019-10-04 ASSESSMENT — PAIN DESCRIPTION - DESCRIPTORS: DESCRIPTORS: SHARP

## 2019-10-04 ASSESSMENT — PAIN DESCRIPTION - LOCATION: LOCATION: TOE (COMMENT WHICH ONE)

## 2019-10-04 ASSESSMENT — PAIN SCALES - GENERAL: PAINLEVEL_OUTOF10: 8

## 2019-10-18 ENCOUNTER — HOSPITAL ENCOUNTER (OUTPATIENT)
Dept: WOUND CARE | Age: 64
Discharge: HOME | End: 2019-10-18
Payer: COMMERCIAL

## 2019-10-18 VITALS
TEMPERATURE: 98.3 F | BODY MASS INDEX: 35.85 KG/M2 | SYSTOLIC BLOOD PRESSURE: 139 MMHG | DIASTOLIC BLOOD PRESSURE: 69 MMHG | RESPIRATION RATE: 16 BRPM | HEART RATE: 70 BPM | WEIGHT: 210 LBS | HEIGHT: 64 IN

## 2019-10-18 PROCEDURE — 99214 OFFICE O/P EST MOD 30 MIN: CPT | Performed by: SURGERY

## 2019-10-18 PROCEDURE — 99213 OFFICE O/P EST LOW 20 MIN: CPT

## 2019-10-18 ASSESSMENT — PAIN DESCRIPTION - DESCRIPTORS: DESCRIPTORS: BURNING

## 2019-10-18 ASSESSMENT — PAIN DESCRIPTION - FREQUENCY: FREQUENCY: INTERMITTENT

## 2019-10-18 ASSESSMENT — PAIN DESCRIPTION - LOCATION: LOCATION: TOE (COMMENT WHICH ONE)

## 2019-10-18 ASSESSMENT — PAIN SCALES - GENERAL: PAINLEVEL_OUTOF10: 7

## 2019-10-18 ASSESSMENT — PAIN DESCRIPTION - PAIN TYPE: TYPE: CHRONIC PAIN

## 2019-10-30 ENCOUNTER — HOSPITAL ENCOUNTER (OUTPATIENT)
Dept: SLEEP CENTER | Age: 64
Discharge: HOME | End: 2019-11-01
Payer: COMMERCIAL

## 2019-10-30 PROCEDURE — 95811 POLYSOM 6/>YRS CPAP 4/> PARM: CPT

## 2019-11-01 ENCOUNTER — HOSPITAL ENCOUNTER (OUTPATIENT)
Dept: WOUND CARE | Age: 64
Discharge: HOME | End: 2019-11-01
Payer: COMMERCIAL

## 2019-11-01 VITALS
DIASTOLIC BLOOD PRESSURE: 57 MMHG | TEMPERATURE: 98.2 F | BODY MASS INDEX: 35.85 KG/M2 | WEIGHT: 210 LBS | SYSTOLIC BLOOD PRESSURE: 120 MMHG | RESPIRATION RATE: 16 BRPM | HEIGHT: 64 IN | HEART RATE: 68 BPM

## 2019-11-01 PROCEDURE — 99213 OFFICE O/P EST LOW 20 MIN: CPT | Performed by: SURGERY

## 2019-11-01 PROCEDURE — 99213 OFFICE O/P EST LOW 20 MIN: CPT

## 2019-11-01 ASSESSMENT — PAIN DESCRIPTION - ORIENTATION: ORIENTATION: RIGHT

## 2019-11-01 ASSESSMENT — PAIN SCALES - GENERAL: PAINLEVEL_OUTOF10: 6

## 2019-11-01 ASSESSMENT — PAIN DESCRIPTION - FREQUENCY: FREQUENCY: INTERMITTENT

## 2019-11-01 ASSESSMENT — PAIN DESCRIPTION - DESCRIPTORS: DESCRIPTORS: BURNING

## 2019-11-01 ASSESSMENT — PAIN DESCRIPTION - LOCATION: LOCATION: TOE (COMMENT WHICH ONE)

## 2019-11-06 PROCEDURE — 95811 POLYSOM 6/>YRS CPAP 4/> PARM: CPT | Performed by: INTERNAL MEDICINE

## 2019-11-15 ENCOUNTER — HOSPITAL ENCOUNTER (OUTPATIENT)
Dept: WOUND CARE | Age: 64
Discharge: HOME | End: 2019-11-15
Payer: COMMERCIAL

## 2019-11-15 VITALS
TEMPERATURE: 98.7 F | BODY MASS INDEX: 35.85 KG/M2 | SYSTOLIC BLOOD PRESSURE: 133 MMHG | HEIGHT: 64 IN | RESPIRATION RATE: 16 BRPM | WEIGHT: 210 LBS | HEART RATE: 69 BPM | DIASTOLIC BLOOD PRESSURE: 63 MMHG

## 2019-11-15 PROCEDURE — 99214 OFFICE O/P EST MOD 30 MIN: CPT | Performed by: SURGERY

## 2019-11-15 PROCEDURE — 99213 OFFICE O/P EST LOW 20 MIN: CPT

## 2019-11-15 ASSESSMENT — PAIN SCALES - GENERAL: PAINLEVEL_OUTOF10: 0

## 2019-11-20 ENCOUNTER — HOSPITAL ENCOUNTER (OUTPATIENT)
Dept: CARDIAC CATH/INVASIVE PROCEDURES | Age: 64
Discharge: HOME HEALTH | End: 2019-11-21
Attending: SURGERY | Admitting: SURGERY
Payer: COMMERCIAL

## 2019-11-20 LAB
ABO/RH: NORMAL
ANION GAP SERPL CALCULATED.3IONS-SCNC: 8 MEQ/L (ref 9–15)
ANTIBODY SCREEN: NORMAL
BUN BLDV-MCNC: 23 MG/DL (ref 8–23)
CALCIUM SERPL-MCNC: 9.7 MG/DL (ref 8.5–9.9)
CHLORIDE BLD-SCNC: 102 MEQ/L (ref 95–107)
CO2: 27 MEQ/L (ref 20–31)
CREAT SERPL-MCNC: 1 MG/DL (ref 0.5–0.9)
GFR AFRICAN AMERICAN: >60
GFR NON-AFRICAN AMERICAN: 56.2
GLUCOSE BLD-MCNC: 120 MG/DL (ref 70–99)
GLUCOSE BLD-MCNC: 214 MG/DL (ref 60–115)
HCT VFR BLD CALC: 37 % (ref 37–47)
HEMOGLOBIN: 12 G/DL (ref 12–16)
INR BLD: 1
MCH RBC QN AUTO: 23.1 PG (ref 27–31.3)
MCHC RBC AUTO-ENTMCNC: 32.5 % (ref 33–37)
MCV RBC AUTO: 71 FL (ref 82–100)
PDW BLD-RTO: 22.5 % (ref 11.5–14.5)
PERFORMED ON: ABNORMAL
PLATELET # BLD: 245 K/UL (ref 130–400)
POTASSIUM SERPL-SCNC: 4.9 MEQ/L (ref 3.4–4.9)
PROTHROMBIN TIME: 13.8 SEC (ref 12.3–14.9)
RBC # BLD: 5.22 M/UL (ref 4.2–5.4)
SODIUM BLD-SCNC: 137 MEQ/L (ref 135–144)
WBC # BLD: 9 K/UL (ref 4.8–10.8)

## 2019-11-20 PROCEDURE — 2709999900 HC NON-CHARGEABLE SUPPLY

## 2019-11-20 PROCEDURE — 85027 COMPLETE CBC AUTOMATED: CPT

## 2019-11-20 PROCEDURE — C1894 INTRO/SHEATH, NON-LASER: HCPCS

## 2019-11-20 PROCEDURE — C1769 GUIDE WIRE: HCPCS

## 2019-11-20 PROCEDURE — 86901 BLOOD TYPING SEROLOGIC RH(D): CPT

## 2019-11-20 PROCEDURE — C1887 CATHETER, GUIDING: HCPCS

## 2019-11-20 PROCEDURE — 75774 ARTERY X-RAY EACH VESSEL: CPT

## 2019-11-20 PROCEDURE — 86850 RBC ANTIBODY SCREEN: CPT

## 2019-11-20 PROCEDURE — 2580000003 HC RX 258

## 2019-11-20 PROCEDURE — 6360000002 HC RX W HCPCS

## 2019-11-20 PROCEDURE — 86900 BLOOD TYPING SEROLOGIC ABO: CPT

## 2019-11-20 PROCEDURE — 2500000003 HC RX 250 WO HCPCS: Performed by: SURGERY

## 2019-11-20 PROCEDURE — 80048 BASIC METABOLIC PNL TOTAL CA: CPT

## 2019-11-20 PROCEDURE — 85347 COAGULATION TIME ACTIVATED: CPT

## 2019-11-20 PROCEDURE — 2580000003 HC RX 258: Performed by: SURGERY

## 2019-11-20 PROCEDURE — 75625 CONTRAST EXAM ABDOMINL AORTA: CPT

## 2019-11-20 PROCEDURE — 85610 PROTHROMBIN TIME: CPT

## 2019-11-20 PROCEDURE — C1725 CATH, TRANSLUMIN NON-LASER: HCPCS

## 2019-11-20 PROCEDURE — 6370000000 HC RX 637 (ALT 250 FOR IP): Performed by: SURGERY

## 2019-11-20 PROCEDURE — 2500000003 HC RX 250 WO HCPCS

## 2019-11-20 PROCEDURE — 6360000002 HC RX W HCPCS: Performed by: SURGERY

## 2019-11-20 PROCEDURE — 75710 ARTERY X-RAYS ARM/LEG: CPT

## 2019-11-20 PROCEDURE — 37228 HC TIB PER TERRITORY PLASTY: CPT

## 2019-11-20 PROCEDURE — 6360000004 HC RX CONTRAST MEDICATION: Performed by: SURGERY

## 2019-11-20 RX ORDER — ARIPIPRAZOLE 20 MG/1
20 TABLET ORAL DAILY
Status: DISCONTINUED | OUTPATIENT
Start: 2019-11-20 | End: 2019-11-21 | Stop reason: HOSPADM

## 2019-11-20 RX ORDER — SODIUM CHLORIDE 0.9 % (FLUSH) 0.9 %
10 SYRINGE (ML) INJECTION EVERY 12 HOURS SCHEDULED
Status: DISCONTINUED | OUTPATIENT
Start: 2019-11-20 | End: 2019-11-21 | Stop reason: HOSPADM

## 2019-11-20 RX ORDER — DEXTROSE MONOHYDRATE 25 G/50ML
12.5 INJECTION, SOLUTION INTRAVENOUS PRN
Status: DISCONTINUED | OUTPATIENT
Start: 2019-11-20 | End: 2019-11-21 | Stop reason: HOSPADM

## 2019-11-20 RX ORDER — SODIUM CHLORIDE 9 MG/ML
INJECTION, SOLUTION INTRAVENOUS CONTINUOUS
Status: DISCONTINUED | OUTPATIENT
Start: 2019-11-20 | End: 2019-11-21 | Stop reason: HOSPADM

## 2019-11-20 RX ORDER — NICOTINE POLACRILEX 4 MG
15 LOZENGE BUCCAL PRN
Status: DISCONTINUED | OUTPATIENT
Start: 2019-11-20 | End: 2019-11-21 | Stop reason: HOSPADM

## 2019-11-20 RX ORDER — LANOLIN ALCOHOL/MO/W.PET/CERES
3 CREAM (GRAM) TOPICAL DAILY
Status: DISCONTINUED | OUTPATIENT
Start: 2019-11-20 | End: 2019-11-21 | Stop reason: HOSPADM

## 2019-11-20 RX ORDER — LEVOTHYROXINE SODIUM 0.05 MG/1
50 TABLET ORAL DAILY
Status: DISCONTINUED | OUTPATIENT
Start: 2019-11-20 | End: 2019-11-21 | Stop reason: HOSPADM

## 2019-11-20 RX ORDER — DEXTROSE MONOHYDRATE 50 MG/ML
100 INJECTION, SOLUTION INTRAVENOUS PRN
Status: DISCONTINUED | OUTPATIENT
Start: 2019-11-20 | End: 2019-11-21 | Stop reason: HOSPADM

## 2019-11-20 RX ORDER — LISINOPRIL 5 MG/1
5 TABLET ORAL DAILY
Status: DISCONTINUED | OUTPATIENT
Start: 2019-11-20 | End: 2019-11-21 | Stop reason: HOSPADM

## 2019-11-20 RX ORDER — ACETYLCYSTEINE 200 MG/ML
600 SOLUTION ORAL; RESPIRATORY (INHALATION) EVERY 6 HOURS
Status: DISPENSED | OUTPATIENT
Start: 2019-11-20 | End: 2019-11-20

## 2019-11-20 RX ORDER — ASPIRIN 81 MG/1
81 TABLET, CHEWABLE ORAL DAILY
Status: DISCONTINUED | OUTPATIENT
Start: 2019-11-20 | End: 2019-11-21 | Stop reason: HOSPADM

## 2019-11-20 RX ORDER — GABAPENTIN 300 MG/1
300 CAPSULE ORAL 3 TIMES DAILY
Status: DISCONTINUED | OUTPATIENT
Start: 2019-11-20 | End: 2019-11-21 | Stop reason: HOSPADM

## 2019-11-20 RX ORDER — ACETAMINOPHEN 325 MG/1
650 TABLET ORAL EVERY 4 HOURS PRN
Status: DISCONTINUED | OUTPATIENT
Start: 2019-11-20 | End: 2019-11-21 | Stop reason: HOSPADM

## 2019-11-20 RX ORDER — SODIUM CHLORIDE 0.9 % (FLUSH) 0.9 %
10 SYRINGE (ML) INJECTION PRN
Status: DISCONTINUED | OUTPATIENT
Start: 2019-11-20 | End: 2019-11-21 | Stop reason: HOSPADM

## 2019-11-20 RX ORDER — ATORVASTATIN CALCIUM 40 MG/1
40 TABLET, FILM COATED ORAL DAILY
Status: DISCONTINUED | OUTPATIENT
Start: 2019-11-20 | End: 2019-11-21 | Stop reason: HOSPADM

## 2019-11-20 RX ORDER — METOPROLOL SUCCINATE 25 MG/1
25 TABLET, EXTENDED RELEASE ORAL DAILY
Status: DISCONTINUED | OUTPATIENT
Start: 2019-11-20 | End: 2019-11-21 | Stop reason: HOSPADM

## 2019-11-20 RX ORDER — FUROSEMIDE 20 MG/1
20 TABLET ORAL
Status: DISCONTINUED | OUTPATIENT
Start: 2019-11-20 | End: 2019-11-21 | Stop reason: HOSPADM

## 2019-11-20 RX ORDER — METOPROLOL SUCCINATE 50 MG/1
50 TABLET, EXTENDED RELEASE ORAL DAILY
Status: DISCONTINUED | OUTPATIENT
Start: 2019-11-20 | End: 2019-11-21 | Stop reason: HOSPADM

## 2019-11-20 RX ORDER — MECLIZINE HYDROCHLORIDE 25 MG/1
25 TABLET ORAL 3 TIMES DAILY
Status: DISCONTINUED | OUTPATIENT
Start: 2019-11-20 | End: 2019-11-21 | Stop reason: HOSPADM

## 2019-11-20 RX ORDER — INSULIN GLARGINE 100 [IU]/ML
35 INJECTION, SOLUTION SUBCUTANEOUS DAILY
Status: DISCONTINUED | OUTPATIENT
Start: 2019-11-20 | End: 2019-11-21 | Stop reason: HOSPADM

## 2019-11-20 RX ORDER — IODIXANOL 320 MG/ML
90 INJECTION, SOLUTION INTRAVASCULAR ONCE
Status: COMPLETED | OUTPATIENT
Start: 2019-11-20 | End: 2019-11-20

## 2019-11-20 RX ORDER — CLOPIDOGREL BISULFATE 75 MG/1
75 TABLET ORAL DAILY
Status: DISCONTINUED | OUTPATIENT
Start: 2019-11-20 | End: 2019-11-21 | Stop reason: HOSPADM

## 2019-11-20 RX ADMIN — GABAPENTIN 300 MG: 300 CAPSULE ORAL at 21:50

## 2019-11-20 RX ADMIN — LISINOPRIL 5 MG: 5 TABLET ORAL at 21:50

## 2019-11-20 RX ADMIN — IODIXANOL 90 ML: 320 INJECTION, SOLUTION INTRAVASCULAR at 16:47

## 2019-11-20 RX ADMIN — MECLIZINE HYDROCHLORIDE 25 MG: 25 TABLET ORAL at 21:50

## 2019-11-20 RX ADMIN — ATORVASTATIN CALCIUM 40 MG: 40 TABLET, FILM COATED ORAL at 21:50

## 2019-11-20 RX ADMIN — ACETYLCYSTEINE 600 MG: 200 SOLUTION ORAL; RESPIRATORY (INHALATION) at 13:34

## 2019-11-20 RX ADMIN — MELATONIN 3 MG: at 21:50

## 2019-11-20 RX ADMIN — Medication: at 13:35

## 2019-11-20 RX ADMIN — CLOPIDOGREL BISULFATE 75 MG: 75 TABLET ORAL at 21:51

## 2019-11-20 ASSESSMENT — PAIN SCALES - GENERAL: PAINLEVEL_OUTOF10: 0

## 2019-11-21 VITALS
WEIGHT: 209.44 LBS | OXYGEN SATURATION: 98 % | HEART RATE: 81 BPM | TEMPERATURE: 97.3 F | SYSTOLIC BLOOD PRESSURE: 135 MMHG | RESPIRATION RATE: 16 BRPM | DIASTOLIC BLOOD PRESSURE: 58 MMHG | BODY MASS INDEX: 35.95 KG/M2

## 2019-11-21 LAB
ANION GAP SERPL CALCULATED.3IONS-SCNC: 9 MEQ/L (ref 9–15)
BUN BLDV-MCNC: 19 MG/DL (ref 8–23)
CALCIUM SERPL-MCNC: 9 MG/DL (ref 8.5–9.9)
CHLORIDE BLD-SCNC: 104 MEQ/L (ref 95–107)
CO2: 26 MEQ/L (ref 20–31)
CREAT SERPL-MCNC: 0.87 MG/DL (ref 0.5–0.9)
GFR AFRICAN AMERICAN: >60
GFR NON-AFRICAN AMERICAN: >60
GLUCOSE BLD-MCNC: 111 MG/DL (ref 60–115)
GLUCOSE BLD-MCNC: 118 MG/DL (ref 70–99)
HCT VFR BLD CALC: 32.3 % (ref 37–47)
HEMOGLOBIN: 10.6 G/DL (ref 12–16)
MCH RBC QN AUTO: 23.5 PG (ref 27–31.3)
MCHC RBC AUTO-ENTMCNC: 32.9 % (ref 33–37)
MCV RBC AUTO: 71.5 FL (ref 82–100)
PDW BLD-RTO: 22.4 % (ref 11.5–14.5)
PERFORMED ON: ABNORMAL
PERFORMED ON: NORMAL
PLATELET # BLD: 212 K/UL (ref 130–400)
POC ACTIVATED CLOTTING TIME KAOLIN: 186 SEC (ref 82–152)
POC ACTIVATED CLOTTING TIME KAOLIN: 213 SEC (ref 82–152)
POC ACTIVATED CLOTTING TIME KAOLIN: 235 SEC (ref 82–152)
POC SAMPLE TYPE: ABNORMAL
POTASSIUM SERPL-SCNC: 4.5 MEQ/L (ref 3.4–4.9)
RBC # BLD: 4.53 M/UL (ref 4.2–5.4)
SODIUM BLD-SCNC: 139 MEQ/L (ref 135–144)
WBC # BLD: 6.8 K/UL (ref 4.8–10.8)

## 2019-11-21 PROCEDURE — 6370000000 HC RX 637 (ALT 250 FOR IP): Performed by: SURGERY

## 2019-11-21 PROCEDURE — 85027 COMPLETE CBC AUTOMATED: CPT

## 2019-11-21 PROCEDURE — 36415 COLL VENOUS BLD VENIPUNCTURE: CPT

## 2019-11-21 PROCEDURE — 80048 BASIC METABOLIC PNL TOTAL CA: CPT

## 2019-11-21 RX ADMIN — ASPIRIN 81 MG 81 MG: 81 TABLET ORAL at 09:41

## 2019-11-21 RX ADMIN — GABAPENTIN 300 MG: 300 CAPSULE ORAL at 09:41

## 2019-11-21 RX ADMIN — LISINOPRIL 5 MG: 5 TABLET ORAL at 09:41

## 2019-11-21 RX ADMIN — LEVOTHYROXINE SODIUM 50 MCG: 50 TABLET ORAL at 06:13

## 2019-11-21 RX ADMIN — MECLIZINE HYDROCHLORIDE 25 MG: 25 TABLET ORAL at 09:50

## 2019-11-21 RX ADMIN — CLOPIDOGREL BISULFATE 75 MG: 75 TABLET ORAL at 09:41

## 2019-11-22 ENCOUNTER — HOSPITAL ENCOUNTER (OUTPATIENT)
Dept: WOUND CARE | Age: 64
Discharge: HOME | End: 2019-11-22
Payer: COMMERCIAL

## 2019-11-22 VITALS
WEIGHT: 209 LBS | SYSTOLIC BLOOD PRESSURE: 135 MMHG | HEIGHT: 64 IN | DIASTOLIC BLOOD PRESSURE: 63 MMHG | RESPIRATION RATE: 16 BRPM | TEMPERATURE: 99.7 F | BODY MASS INDEX: 35.68 KG/M2 | HEART RATE: 81 BPM

## 2019-11-22 PROBLEM — L97.516 DIABETIC ULCER OF RIGHT FOOT WITH BONE INVOLVEMENT WITHOUT EVIDENCE OF NECROSIS (HCC): Status: ACTIVE | Noted: 2019-09-06

## 2019-11-22 PROBLEM — E11.621 DIABETIC ULCER OF RIGHT FOOT WITH BONE INVOLVEMENT WITHOUT EVIDENCE OF NECROSIS (HCC): Status: ACTIVE | Noted: 2019-09-06

## 2019-11-22 PROCEDURE — 11042 DBRDMT SUBQ TIS 1ST 20SQCM/<: CPT | Performed by: SURGERY

## 2019-11-22 PROCEDURE — 11042 DBRDMT SUBQ TIS 1ST 20SQCM/<: CPT

## 2019-11-22 ASSESSMENT — PAIN SCALES - GENERAL: PAINLEVEL_OUTOF10: 8

## 2019-11-22 ASSESSMENT — PAIN DESCRIPTION - LOCATION: LOCATION: FOOT

## 2019-11-22 ASSESSMENT — PAIN DESCRIPTION - DESCRIPTORS: DESCRIPTORS: BURNING

## 2019-11-22 ASSESSMENT — PAIN DESCRIPTION - PAIN TYPE: TYPE: CHRONIC PAIN

## 2019-11-22 ASSESSMENT — PAIN DESCRIPTION - ORIENTATION: ORIENTATION: RIGHT

## 2019-11-22 ASSESSMENT — PAIN DESCRIPTION - FREQUENCY: FREQUENCY: INTERMITTENT

## 2019-12-02 ENCOUNTER — HOSPITAL ENCOUNTER (OUTPATIENT)
Dept: HYPERBARIC MEDICINE | Age: 64
Discharge: HOME | End: 2019-12-02
Payer: COMMERCIAL

## 2019-12-02 VITALS
TEMPERATURE: 97.6 F | DIASTOLIC BLOOD PRESSURE: 73 MMHG | SYSTOLIC BLOOD PRESSURE: 144 MMHG | RESPIRATION RATE: 18 BRPM | HEART RATE: 74 BPM

## 2019-12-02 LAB
GLUCOSE BLD-MCNC: 111 MG/DL (ref 60–115)
GLUCOSE BLD-MCNC: 116 MG/DL (ref 60–115)
GLUCOSE BLD-MCNC: 117 MG/DL (ref 60–115)
GLUCOSE BLD-MCNC: 122 MG/DL (ref 60–115)
GLUCOSE BLD-MCNC: 203 MG/DL (ref 60–115)
GLUCOSE BLD-MCNC: 203 MG/DL (ref 60–115)
PERFORMED ON: ABNORMAL
PERFORMED ON: NORMAL

## 2019-12-02 PROCEDURE — G0277 HBOT, FULL BODY CHAMBER, 30M: HCPCS

## 2019-12-02 PROCEDURE — 99183 HYPERBARIC OXYGEN THERAPY: CPT | Performed by: SURGERY

## 2019-12-03 ENCOUNTER — HOSPITAL ENCOUNTER (OUTPATIENT)
Dept: HYPERBARIC MEDICINE | Age: 64
Discharge: HOME | End: 2019-12-03
Payer: COMMERCIAL

## 2019-12-03 VITALS
RESPIRATION RATE: 18 BRPM | DIASTOLIC BLOOD PRESSURE: 83 MMHG | SYSTOLIC BLOOD PRESSURE: 148 MMHG | HEART RATE: 78 BPM | TEMPERATURE: 97.8 F

## 2019-12-03 LAB
GLUCOSE BLD-MCNC: 113 MG/DL (ref 60–115)
GLUCOSE BLD-MCNC: 135 MG/DL (ref 60–115)
GLUCOSE BLD-MCNC: 149 MG/DL (ref 60–115)
GLUCOSE BLD-MCNC: 79 MG/DL (ref 60–115)
PERFORMED ON: ABNORMAL
PERFORMED ON: ABNORMAL
PERFORMED ON: NORMAL
PERFORMED ON: NORMAL

## 2019-12-03 PROCEDURE — G0277 HBOT, FULL BODY CHAMBER, 30M: HCPCS

## 2019-12-04 ENCOUNTER — HOSPITAL ENCOUNTER (OUTPATIENT)
Dept: HYPERBARIC MEDICINE | Age: 64
Discharge: HOME | End: 2019-12-04
Payer: COMMERCIAL

## 2019-12-04 VITALS
SYSTOLIC BLOOD PRESSURE: 134 MMHG | HEART RATE: 71 BPM | TEMPERATURE: 96.7 F | RESPIRATION RATE: 18 BRPM | DIASTOLIC BLOOD PRESSURE: 71 MMHG

## 2019-12-04 LAB
GLUCOSE BLD-MCNC: 110 MG/DL (ref 60–115)
GLUCOSE BLD-MCNC: 210 MG/DL (ref 60–115)
GLUCOSE BLD-MCNC: 91 MG/DL (ref 60–115)
GLUCOSE BLD-MCNC: 99 MG/DL (ref 60–115)
PERFORMED ON: ABNORMAL
PERFORMED ON: NORMAL

## 2019-12-04 PROCEDURE — G0277 HBOT, FULL BODY CHAMBER, 30M: HCPCS

## 2019-12-04 PROCEDURE — 99183 HYPERBARIC OXYGEN THERAPY: CPT | Performed by: NURSE PRACTITIONER

## 2019-12-05 ENCOUNTER — HOSPITAL ENCOUNTER (OUTPATIENT)
Dept: HYPERBARIC MEDICINE | Age: 64
Discharge: HOME | End: 2019-12-05
Payer: COMMERCIAL

## 2019-12-05 VITALS
DIASTOLIC BLOOD PRESSURE: 68 MMHG | TEMPERATURE: 97.8 F | SYSTOLIC BLOOD PRESSURE: 138 MMHG | HEART RATE: 78 BPM | RESPIRATION RATE: 18 BRPM

## 2019-12-05 LAB
GLUCOSE BLD-MCNC: 112 MG/DL (ref 60–115)
GLUCOSE BLD-MCNC: 122 MG/DL (ref 60–115)
GLUCOSE BLD-MCNC: 128 MG/DL (ref 60–115)
GLUCOSE BLD-MCNC: 128 MG/DL (ref 60–115)
GLUCOSE BLD-MCNC: 159 MG/DL (ref 60–115)
GLUCOSE BLD-MCNC: 182 MG/DL (ref 60–115)
PERFORMED ON: ABNORMAL
PERFORMED ON: NORMAL

## 2019-12-05 PROCEDURE — G0277 HBOT, FULL BODY CHAMBER, 30M: HCPCS

## 2019-12-06 ENCOUNTER — HOSPITAL ENCOUNTER (OUTPATIENT)
Dept: WOUND CARE | Age: 64
Discharge: HOME | End: 2019-12-06
Payer: COMMERCIAL

## 2019-12-06 VITALS
DIASTOLIC BLOOD PRESSURE: 61 MMHG | HEART RATE: 90 BPM | RESPIRATION RATE: 16 BRPM | HEIGHT: 64 IN | WEIGHT: 209 LBS | BODY MASS INDEX: 35.68 KG/M2 | TEMPERATURE: 99 F | SYSTOLIC BLOOD PRESSURE: 139 MMHG

## 2019-12-06 LAB
GLUCOSE BLD-MCNC: 126 MG/DL (ref 60–115)
GLUCOSE BLD-MCNC: 89 MG/DL (ref 60–115)
PERFORMED ON: ABNORMAL
PERFORMED ON: NORMAL

## 2019-12-06 PROCEDURE — 11042 DBRDMT SUBQ TIS 1ST 20SQCM/<: CPT | Performed by: SURGERY

## 2019-12-06 PROCEDURE — 99213 OFFICE O/P EST LOW 20 MIN: CPT | Performed by: SURGERY

## 2019-12-06 PROCEDURE — 11042 DBRDMT SUBQ TIS 1ST 20SQCM/<: CPT

## 2019-12-06 ASSESSMENT — PAIN SCALES - GENERAL: PAINLEVEL_OUTOF10: 0

## 2019-12-09 ENCOUNTER — HOSPITAL ENCOUNTER (OUTPATIENT)
Dept: HYPERBARIC MEDICINE | Age: 64
Discharge: HOME | End: 2019-12-09
Payer: COMMERCIAL

## 2019-12-09 VITALS
HEART RATE: 89 BPM | SYSTOLIC BLOOD PRESSURE: 142 MMHG | RESPIRATION RATE: 16 BRPM | DIASTOLIC BLOOD PRESSURE: 90 MMHG | TEMPERATURE: 99.5 F

## 2019-12-09 LAB
GLUCOSE BLD-MCNC: 140 MG/DL (ref 60–115)
GLUCOSE BLD-MCNC: 174 MG/DL (ref 60–115)
PERFORMED ON: ABNORMAL
PERFORMED ON: ABNORMAL

## 2019-12-09 PROCEDURE — G0277 HBOT, FULL BODY CHAMBER, 30M: HCPCS

## 2019-12-10 ENCOUNTER — HOSPITAL ENCOUNTER (OUTPATIENT)
Dept: HYPERBARIC MEDICINE | Age: 64
Discharge: HOME | End: 2019-12-10
Payer: COMMERCIAL

## 2019-12-10 VITALS
TEMPERATURE: 97.6 F | HEART RATE: 71 BPM | SYSTOLIC BLOOD PRESSURE: 134 MMHG | RESPIRATION RATE: 18 BRPM | DIASTOLIC BLOOD PRESSURE: 76 MMHG

## 2019-12-10 LAB
GLUCOSE BLD-MCNC: 227 MG/DL (ref 60–115)
GLUCOSE BLD-MCNC: 274 MG/DL (ref 60–115)
PERFORMED ON: ABNORMAL
PERFORMED ON: ABNORMAL

## 2019-12-10 PROCEDURE — G0277 HBOT, FULL BODY CHAMBER, 30M: HCPCS

## 2019-12-10 PROCEDURE — 99183 HYPERBARIC OXYGEN THERAPY: CPT | Performed by: SURGERY

## 2019-12-11 ENCOUNTER — HOSPITAL ENCOUNTER (OUTPATIENT)
Dept: HYPERBARIC MEDICINE | Age: 64
Discharge: HOME | End: 2019-12-11
Payer: COMMERCIAL

## 2019-12-11 VITALS
RESPIRATION RATE: 18 BRPM | HEART RATE: 69 BPM | DIASTOLIC BLOOD PRESSURE: 74 MMHG | SYSTOLIC BLOOD PRESSURE: 157 MMHG | TEMPERATURE: 97.6 F

## 2019-12-11 LAB
GLUCOSE BLD-MCNC: 299 MG/DL (ref 60–115)
GLUCOSE BLD-MCNC: 350 MG/DL (ref 60–115)
PERFORMED ON: ABNORMAL
PERFORMED ON: ABNORMAL

## 2019-12-11 PROCEDURE — G0277 HBOT, FULL BODY CHAMBER, 30M: HCPCS

## 2019-12-12 ENCOUNTER — HOSPITAL ENCOUNTER (OUTPATIENT)
Dept: HYPERBARIC MEDICINE | Age: 64
Discharge: HOME | End: 2019-12-12
Payer: COMMERCIAL

## 2019-12-12 VITALS
RESPIRATION RATE: 18 BRPM | DIASTOLIC BLOOD PRESSURE: 78 MMHG | TEMPERATURE: 98 F | SYSTOLIC BLOOD PRESSURE: 132 MMHG | HEART RATE: 77 BPM

## 2019-12-12 LAB
GLUCOSE BLD-MCNC: 200 MG/DL (ref 60–115)
GLUCOSE BLD-MCNC: 241 MG/DL (ref 60–115)
PERFORMED ON: ABNORMAL
PERFORMED ON: ABNORMAL

## 2019-12-12 PROCEDURE — G0277 HBOT, FULL BODY CHAMBER, 30M: HCPCS

## 2019-12-12 ASSESSMENT — PAIN SCALES - GENERAL: PAINLEVEL_OUTOF10: 0

## 2019-12-13 ENCOUNTER — HOSPITAL ENCOUNTER (OUTPATIENT)
Dept: HYPERBARIC MEDICINE | Age: 64
Discharge: HOME | End: 2019-12-13
Payer: COMMERCIAL

## 2019-12-13 VITALS
DIASTOLIC BLOOD PRESSURE: 60 MMHG | RESPIRATION RATE: 18 BRPM | HEART RATE: 67 BPM | SYSTOLIC BLOOD PRESSURE: 134 MMHG | TEMPERATURE: 98 F

## 2019-12-13 LAB
GLUCOSE BLD-MCNC: 281 MG/DL (ref 60–115)
PERFORMED ON: ABNORMAL

## 2019-12-13 PROCEDURE — G0277 HBOT, FULL BODY CHAMBER, 30M: HCPCS

## 2019-12-13 PROCEDURE — 99183 HYPERBARIC OXYGEN THERAPY: CPT | Performed by: NURSE PRACTITIONER

## 2019-12-16 ENCOUNTER — HOSPITAL ENCOUNTER (OUTPATIENT)
Dept: HYPERBARIC MEDICINE | Age: 64
Discharge: HOME | End: 2019-12-16
Payer: COMMERCIAL

## 2019-12-16 VITALS
TEMPERATURE: 98.1 F | RESPIRATION RATE: 18 BRPM | HEART RATE: 74 BPM | SYSTOLIC BLOOD PRESSURE: 126 MMHG | DIASTOLIC BLOOD PRESSURE: 69 MMHG

## 2019-12-16 LAB
GLUCOSE BLD-MCNC: 170 MG/DL (ref 60–115)
GLUCOSE BLD-MCNC: 184 MG/DL (ref 60–115)
GLUCOSE BLD-MCNC: 235 MG/DL (ref 60–115)
PERFORMED ON: ABNORMAL

## 2019-12-16 PROCEDURE — G0277 HBOT, FULL BODY CHAMBER, 30M: HCPCS

## 2019-12-17 ENCOUNTER — HOSPITAL ENCOUNTER (OUTPATIENT)
Dept: HYPERBARIC MEDICINE | Age: 64
Discharge: HOME | End: 2019-12-17
Payer: COMMERCIAL

## 2019-12-17 VITALS
HEART RATE: 68 BPM | TEMPERATURE: 98.3 F | DIASTOLIC BLOOD PRESSURE: 70 MMHG | SYSTOLIC BLOOD PRESSURE: 118 MMHG | RESPIRATION RATE: 18 BRPM

## 2019-12-17 LAB
GLUCOSE BLD-MCNC: 230 MG/DL (ref 60–115)
GLUCOSE BLD-MCNC: 367 MG/DL (ref 60–115)
PERFORMED ON: ABNORMAL
PERFORMED ON: ABNORMAL

## 2019-12-17 PROCEDURE — G0277 HBOT, FULL BODY CHAMBER, 30M: HCPCS

## 2019-12-18 ENCOUNTER — HOSPITAL ENCOUNTER (OUTPATIENT)
Dept: HYPERBARIC MEDICINE | Age: 64
Discharge: HOME | End: 2019-12-18
Payer: COMMERCIAL

## 2019-12-18 VITALS — TEMPERATURE: 97.8 F | RESPIRATION RATE: 18 BRPM | HEART RATE: 62 BPM

## 2019-12-18 LAB
GLUCOSE BLD-MCNC: 161 MG/DL (ref 60–115)
GLUCOSE BLD-MCNC: 204 MG/DL (ref 60–115)
PERFORMED ON: ABNORMAL
PERFORMED ON: ABNORMAL

## 2019-12-18 PROCEDURE — G0277 HBOT, FULL BODY CHAMBER, 30M: HCPCS

## 2019-12-18 PROCEDURE — 99183 HYPERBARIC OXYGEN THERAPY: CPT | Performed by: NURSE PRACTITIONER

## 2019-12-19 ENCOUNTER — HOSPITAL ENCOUNTER (OUTPATIENT)
Dept: HYPERBARIC MEDICINE | Age: 64
Discharge: HOME | End: 2019-12-19
Payer: COMMERCIAL

## 2019-12-19 VITALS
RESPIRATION RATE: 18 BRPM | HEART RATE: 70 BPM | TEMPERATURE: 97.3 F | SYSTOLIC BLOOD PRESSURE: 130 MMHG | DIASTOLIC BLOOD PRESSURE: 80 MMHG

## 2019-12-19 LAB
GLUCOSE BLD-MCNC: 183 MG/DL (ref 60–115)
GLUCOSE BLD-MCNC: 239 MG/DL (ref 60–115)
PERFORMED ON: ABNORMAL
PERFORMED ON: ABNORMAL

## 2019-12-19 PROCEDURE — G0277 HBOT, FULL BODY CHAMBER, 30M: HCPCS

## 2019-12-20 ENCOUNTER — HOSPITAL ENCOUNTER (OUTPATIENT)
Dept: HYPERBARIC MEDICINE | Age: 64
Discharge: HOME | End: 2019-12-20
Payer: COMMERCIAL

## 2019-12-20 ENCOUNTER — HOSPITAL ENCOUNTER (OUTPATIENT)
Dept: WOUND CARE | Age: 64
Discharge: HOME | End: 2019-12-20
Payer: COMMERCIAL

## 2019-12-20 VITALS
SYSTOLIC BLOOD PRESSURE: 151 MMHG | RESPIRATION RATE: 18 BRPM | TEMPERATURE: 97.8 F | HEART RATE: 76 BPM | DIASTOLIC BLOOD PRESSURE: 61 MMHG

## 2019-12-20 VITALS
DIASTOLIC BLOOD PRESSURE: 58 MMHG | SYSTOLIC BLOOD PRESSURE: 122 MMHG | RESPIRATION RATE: 16 BRPM | TEMPERATURE: 99 F | HEART RATE: 78 BPM

## 2019-12-20 LAB
GLUCOSE BLD-MCNC: 148 MG/DL (ref 60–115)
GLUCOSE BLD-MCNC: 163 MG/DL (ref 60–115)
PERFORMED ON: ABNORMAL
PERFORMED ON: ABNORMAL

## 2019-12-20 PROCEDURE — 99213 OFFICE O/P EST LOW 20 MIN: CPT | Performed by: SURGERY

## 2019-12-20 PROCEDURE — 11042 DBRDMT SUBQ TIS 1ST 20SQCM/<: CPT | Performed by: SURGERY

## 2019-12-20 PROCEDURE — 11042 DBRDMT SUBQ TIS 1ST 20SQCM/<: CPT

## 2019-12-20 PROCEDURE — G0277 HBOT, FULL BODY CHAMBER, 30M: HCPCS

## 2019-12-23 ENCOUNTER — HOSPITAL ENCOUNTER (OUTPATIENT)
Dept: HYPERBARIC MEDICINE | Age: 64
Discharge: HOME | End: 2019-12-23
Payer: COMMERCIAL

## 2019-12-23 VITALS
DIASTOLIC BLOOD PRESSURE: 76 MMHG | RESPIRATION RATE: 18 BRPM | SYSTOLIC BLOOD PRESSURE: 120 MMHG | HEART RATE: 76 BPM | TEMPERATURE: 97.9 F

## 2019-12-23 LAB
GLUCOSE BLD-MCNC: 158 MG/DL (ref 60–115)
GLUCOSE BLD-MCNC: 249 MG/DL (ref 60–115)
PERFORMED ON: ABNORMAL
PERFORMED ON: ABNORMAL

## 2019-12-23 PROCEDURE — G0277 HBOT, FULL BODY CHAMBER, 30M: HCPCS

## 2019-12-24 ENCOUNTER — HOSPITAL ENCOUNTER (OUTPATIENT)
Dept: HYPERBARIC MEDICINE | Age: 64
Discharge: HOME | End: 2019-12-24
Payer: COMMERCIAL

## 2019-12-24 VITALS
RESPIRATION RATE: 18 BRPM | HEART RATE: 58 BPM | DIASTOLIC BLOOD PRESSURE: 70 MMHG | TEMPERATURE: 97.6 F | SYSTOLIC BLOOD PRESSURE: 126 MMHG

## 2019-12-24 LAB
GLUCOSE BLD-MCNC: 142 MG/DL (ref 60–115)
GLUCOSE BLD-MCNC: 241 MG/DL (ref 60–115)
PERFORMED ON: ABNORMAL
PERFORMED ON: ABNORMAL

## 2019-12-24 PROCEDURE — G0277 HBOT, FULL BODY CHAMBER, 30M: HCPCS

## 2019-12-26 ENCOUNTER — HOSPITAL ENCOUNTER (OUTPATIENT)
Dept: HYPERBARIC MEDICINE | Age: 64
Discharge: HOME | End: 2019-12-26
Payer: COMMERCIAL

## 2019-12-26 VITALS
SYSTOLIC BLOOD PRESSURE: 128 MMHG | TEMPERATURE: 97.1 F | DIASTOLIC BLOOD PRESSURE: 70 MMHG | HEART RATE: 65 BPM | RESPIRATION RATE: 18 BRPM

## 2019-12-26 LAB
GLUCOSE BLD-MCNC: 186 MG/DL (ref 60–115)
GLUCOSE BLD-MCNC: 262 MG/DL (ref 60–115)
PERFORMED ON: ABNORMAL
PERFORMED ON: ABNORMAL

## 2019-12-26 PROCEDURE — G0277 HBOT, FULL BODY CHAMBER, 30M: HCPCS

## 2019-12-27 ENCOUNTER — HOSPITAL ENCOUNTER (OUTPATIENT)
Dept: HYPERBARIC MEDICINE | Age: 64
Discharge: HOME | End: 2019-12-27
Payer: COMMERCIAL

## 2019-12-27 VITALS
TEMPERATURE: 97.6 F | SYSTOLIC BLOOD PRESSURE: 140 MMHG | HEART RATE: 76 BPM | DIASTOLIC BLOOD PRESSURE: 73 MMHG | RESPIRATION RATE: 18 BRPM

## 2019-12-27 LAB
GLUCOSE BLD-MCNC: 131 MG/DL (ref 60–115)
GLUCOSE BLD-MCNC: 163 MG/DL (ref 60–115)
PERFORMED ON: ABNORMAL
PERFORMED ON: ABNORMAL

## 2019-12-27 PROCEDURE — G0277 HBOT, FULL BODY CHAMBER, 30M: HCPCS

## 2019-12-27 PROCEDURE — 99183 HYPERBARIC OXYGEN THERAPY: CPT | Performed by: NURSE PRACTITIONER

## 2019-12-30 ENCOUNTER — HOSPITAL ENCOUNTER (OUTPATIENT)
Dept: HYPERBARIC MEDICINE | Age: 64
Discharge: HOME | End: 2019-12-30
Payer: COMMERCIAL

## 2019-12-30 VITALS
SYSTOLIC BLOOD PRESSURE: 140 MMHG | TEMPERATURE: 98.7 F | HEART RATE: 73 BPM | RESPIRATION RATE: 18 BRPM | DIASTOLIC BLOOD PRESSURE: 73 MMHG

## 2019-12-30 LAB
GLUCOSE BLD-MCNC: 149 MG/DL (ref 60–115)
GLUCOSE BLD-MCNC: 244 MG/DL (ref 60–115)
PERFORMED ON: ABNORMAL
PERFORMED ON: ABNORMAL

## 2019-12-30 PROCEDURE — G0277 HBOT, FULL BODY CHAMBER, 30M: HCPCS

## 2019-12-31 ENCOUNTER — HOSPITAL ENCOUNTER (OUTPATIENT)
Dept: HYPERBARIC MEDICINE | Age: 64
Discharge: HOME | End: 2019-12-31
Payer: COMMERCIAL

## 2019-12-31 VITALS
SYSTOLIC BLOOD PRESSURE: 110 MMHG | HEART RATE: 60 BPM | DIASTOLIC BLOOD PRESSURE: 76 MMHG | TEMPERATURE: 97.7 F | RESPIRATION RATE: 18 BRPM

## 2019-12-31 LAB
GLUCOSE BLD-MCNC: 180 MG/DL (ref 60–115)
GLUCOSE BLD-MCNC: 94 MG/DL (ref 60–115)
PERFORMED ON: ABNORMAL
PERFORMED ON: NORMAL

## 2019-12-31 PROCEDURE — G0277 HBOT, FULL BODY CHAMBER, 30M: HCPCS

## 2019-12-31 NOTE — PROGRESS NOTES
Hyperbaric Oxygen Therapy   Progress Note    NAME: Garth Mota RECORD NUMBER:  [id number]  AGE: 60 y.o. GENDER: female  : 1955  EPISODE DATE:  2019   Subjective   HBO Treatment Number: 20 out of Total Treatments: 20  HBO Diagnosis:   Apoorva : Apoorva  3(right foot third toe)  Indications: Lower Extremity Diabetic Wound ___(site)  Safety checks performed prior to treatment. See doc flowsheets for documentation. Objective        Recent Labs     19  1057 19  1314   POCGLU 180* 94     Pre treatment Vital Signs       Temp: 96.8 °F (36 °C)     Pulse: 58     Resp: 18     BP: (!) 122/58     Post treatment Vital Signs  Temp: 97.7 °F (36.5 °C)  Pulse: 60  Resp: 18  BP: 110/76  Assessment      Physical Exam:  General Appearance:  alert and oriented to person, place and time, well-developed and well-nourished, in no acute distress  ENT:  tympanic membranes intact bilaterally. Proir area of trauma to right ear when she used a comb to scratch her ear is beginning to heal  Pulmonary/Chest:  clear to auscultation bilaterally- no wheezes, rales or rhonchi, normal air movement, no respiratory distress  Cardiovascular:  regular rate and rhythm  Chamber #: [id number]  Treatment Start Time: 1115     Pressure Reached Time: 1125  DOROTHY : 2  Number of Air Breaks:  Treatment Status: No Air break     Decompression Time: 1255   Treatment End Time: 1305  Length of Treatment: 90 Minutes  Symptoms Noted During Treatment: None    Adverse Event: no    I was present on these premises and immediately available to furnish assistance & direction throughout the procedure. Brenda Campoverde is a 60 y.o. female  did successfully complete today's hyperbaric oxygen treatment at [facility]. In my clinical judgement, ongoing HBO therapy is  necessary at this time, given a threat to patient function, limb or life from the current condition.       Supervision and

## 2020-01-02 ENCOUNTER — HOSPITAL ENCOUNTER (OUTPATIENT)
Dept: HYPERBARIC MEDICINE | Age: 65
Discharge: HOME | End: 2020-01-02
Payer: COMMERCIAL

## 2020-01-02 ENCOUNTER — OFFICE VISIT (OUTPATIENT)
Dept: ENDOCRINOLOGY | Age: 65
End: 2020-01-02
Payer: COMMERCIAL

## 2020-01-02 VITALS
DIASTOLIC BLOOD PRESSURE: 75 MMHG | HEART RATE: 78 BPM | SYSTOLIC BLOOD PRESSURE: 158 MMHG | RESPIRATION RATE: 18 BRPM | TEMPERATURE: 97.1 F

## 2020-01-02 VITALS
HEART RATE: 69 BPM | DIASTOLIC BLOOD PRESSURE: 73 MMHG | RESPIRATION RATE: 16 BRPM | OXYGEN SATURATION: 93 % | SYSTOLIC BLOOD PRESSURE: 118 MMHG

## 2020-01-02 LAB
CHP ED QC CHECK: NORMAL
GLUCOSE BLD-MCNC: 187 MG/DL (ref 60–115)
GLUCOSE BLD-MCNC: 201 MG/DL
GLUCOSE BLD-MCNC: 298 MG/DL (ref 60–115)
PERFORMED ON: ABNORMAL
PERFORMED ON: ABNORMAL

## 2020-01-02 PROCEDURE — 1036F TOBACCO NON-USER: CPT | Performed by: INTERNAL MEDICINE

## 2020-01-02 PROCEDURE — 82962 GLUCOSE BLOOD TEST: CPT | Performed by: INTERNAL MEDICINE

## 2020-01-02 PROCEDURE — G0277 HBOT, FULL BODY CHAMBER, 30M: HCPCS

## 2020-01-02 PROCEDURE — G8417 CALC BMI ABV UP PARAM F/U: HCPCS | Performed by: INTERNAL MEDICINE

## 2020-01-02 PROCEDURE — 3046F HEMOGLOBIN A1C LEVEL >9.0%: CPT | Performed by: INTERNAL MEDICINE

## 2020-01-02 PROCEDURE — G8427 DOCREV CUR MEDS BY ELIG CLIN: HCPCS | Performed by: INTERNAL MEDICINE

## 2020-01-02 PROCEDURE — G8484 FLU IMMUNIZE NO ADMIN: HCPCS | Performed by: INTERNAL MEDICINE

## 2020-01-02 PROCEDURE — 99203 OFFICE O/P NEW LOW 30 MIN: CPT | Performed by: INTERNAL MEDICINE

## 2020-01-02 PROCEDURE — 2022F DILAT RTA XM EVC RTNOPTHY: CPT | Performed by: INTERNAL MEDICINE

## 2020-01-02 PROCEDURE — 3017F COLORECTAL CA SCREEN DOC REV: CPT | Performed by: INTERNAL MEDICINE

## 2020-01-02 RX ORDER — INSULIN GLARGINE 100 [IU]/ML
10 INJECTION, SOLUTION SUBCUTANEOUS NIGHTLY
Qty: 1 VIAL | Refills: 3 | Status: ON HOLD
Start: 2020-01-02 | End: 2020-02-12 | Stop reason: SDUPTHER

## 2020-01-02 NOTE — PROGRESS NOTES
Hyperbaric Oxygen Therapy   Progress Note    NAME: Garth Mota RECORD NUMBER:  [id number]  AGE: 60 y.o. GENDER: female  : 1955  EPISODE DATE:  2020   Subjective   HBO Treatment Number: 21 out of Total Treatments: 20  HBO Diagnosis:   Pearl Beards: Pearl Beards 3(right foot third toe)  Indications: Lower Extremity Diabetic Wound ___(site)  Safety checks performed prior to treatment. See doc flowsheets for documentation. Objective        Recent Labs     20  1147 20  1357   POCGLU 298* 187*     Pre treatment Vital Signs       Temp: 97.2 °F (36.2 °C)     Pulse: 97     Resp: 18     BP: 132/66     Post treatment Vital Signs  Temp: 97.1 °F (36.2 °C)  Pulse: 78  Resp: 18   BP: (!) 158/75  Assessment      Physical Exam:  General Appearance:  alert and oriented to person, place and time, well-developed and well-nourished, in no acute distress  ENT:  tympanic membranes intact bilaterally  Pulmonary/Chest:  clear to auscultation bilaterally- no wheezes, rales or rhonchi, normal air movement, no respiratory distress  Cardiovascular:  regular rate and rhythm  Chamber #: [id number]  Treatment Start Time: 1215     Pressure Reached Time: 1215  DOROTHY : 2  Number of Air Breaks:  Treatment Status: No Air break     Decompression Time: 1345   Treatment End Time: 7126  Length of Treatment: 90 Minutes  Symptoms Noted During Treatment: None    Adverse Event: no    I was present on these premises and immediately available to furnish assistance & direction throughout the procedure. Brenda Arvizu is a 60 y.o. female  did successfully complete today's hyperbaric oxygen treatment at [facility]. In my clinical judgement, ongoing HBO therapy is  necessary at this time, given a threat to patient function, limb or life from the current condition. Supervision and attendance of Hyperbaric Oxygen Therapy provided.   Continue HBO treatment as outlined in the

## 2020-01-03 ENCOUNTER — HOSPITAL ENCOUNTER (OUTPATIENT)
Dept: WOUND CARE | Age: 65
Discharge: HOME | End: 2020-01-03
Payer: COMMERCIAL

## 2020-01-03 ENCOUNTER — HOSPITAL ENCOUNTER (OUTPATIENT)
Dept: HYPERBARIC MEDICINE | Age: 65
Discharge: HOME | End: 2020-01-03
Payer: COMMERCIAL

## 2020-01-03 VITALS
DIASTOLIC BLOOD PRESSURE: 58 MMHG | RESPIRATION RATE: 18 BRPM | SYSTOLIC BLOOD PRESSURE: 128 MMHG | TEMPERATURE: 97.6 F | HEART RATE: 86 BPM

## 2020-01-03 VITALS
BODY MASS INDEX: 35.68 KG/M2 | TEMPERATURE: 97.6 F | HEIGHT: 64 IN | DIASTOLIC BLOOD PRESSURE: 54 MMHG | WEIGHT: 209 LBS | RESPIRATION RATE: 18 BRPM | HEART RATE: 58 BPM | SYSTOLIC BLOOD PRESSURE: 159 MMHG

## 2020-01-03 LAB
GLUCOSE BLD-MCNC: 176 MG/DL (ref 60–115)
GLUCOSE BLD-MCNC: 224 MG/DL (ref 60–115)
PERFORMED ON: ABNORMAL
PERFORMED ON: ABNORMAL

## 2020-01-03 PROCEDURE — 11042 DBRDMT SUBQ TIS 1ST 20SQCM/<: CPT | Performed by: SURGERY

## 2020-01-03 PROCEDURE — 99213 OFFICE O/P EST LOW 20 MIN: CPT | Performed by: SURGERY

## 2020-01-03 PROCEDURE — 11042 DBRDMT SUBQ TIS 1ST 20SQCM/<: CPT

## 2020-01-03 PROCEDURE — G0277 HBOT, FULL BODY CHAMBER, 30M: HCPCS

## 2020-01-03 PROCEDURE — 99183 HYPERBARIC OXYGEN THERAPY: CPT | Performed by: NURSE PRACTITIONER

## 2020-01-03 ASSESSMENT — PAIN SCALES - GENERAL: PAINLEVEL_OUTOF10: 0

## 2020-01-03 NOTE — PROGRESS NOTES
Hyperbaric Oxygen Therapy   Progress Note    NAME: Garth Mota RECORD NUMBER:  [id number]  AGE: 60 y.o. GENDER: female  : 1955  EPISODE DATE:  1/3/2020   Subjective   HBO Treatment Number: 42 out of Total Treatments: 20  HBO Diagnosis:   Rossy Blower: Rossy Mesa 3(right foot third toe)  Indications: Lower Extremity Diabetic Wound ___(site)  Safety checks performed prior to treatment. See doc flowsheets for documentation. Objective        Recent Labs     20  1357 20  1130   POCGLU 187* 224*     Pre treatment Vital Signs       Temp: 97.6 °F (36.4 °C)     Pulse: 58     Resp: 18     BP: (!) 159/54     Post treatment Vital Signs  Temp: 97.6 °F (36.4 °C)  Pulse: 86  Resp: 18  BP: (!) 128/58  Assessment      Physical Exam:  General Appearance:  alert and oriented to person, place and time, well-developed and well-nourished, in no acute distress  ENT:  tympanic membranes intact bilaterally with pre and post TEED scores bilaterally normal   Pulmonary/Chest:  clear to auscultation bilaterally- no wheezes, rales or rhonchi, normal air movement, no respiratory distress  Cardiovascular:  regular rate and rhythm, no murmurs rubs or gallops  Chamber #: [id number]  Treatment Start Time: 1145     Pressure Reached Time: 1155  DOROTHY : 2  Number of Air Breaks:  Treatment Status: No Air break     Decompression Time: 1325   Treatment End Time: 1340  Length of Treatment: 90 Minutes  Symptoms Noted During Treatment: None    Adverse Event: no    I was present on these premises and immediately available to furnish assistance & direction throughout the procedure. Brenda      Phil Christensen is a 60 y.o. female  did successfully complete today's hyperbaric oxygen treatment at [address] [place] [address]. In my clinical judgement, ongoing HBO therapy is  necessary at this time, given a threat to patient function, limb or life from the current condition.       Supervision and attendance

## 2020-01-03 NOTE — PROGRESS NOTES
Subjective:      Patient ID: Huang Giraldo is a 60 y.o. female. Pt referred for diabetes and hypothyroidisms  H/o cad and pvd   Getting hyperbaric treatment for rt foot wound s/p rt 3rd toe amputation   Diabetes   She presents for her initial diabetic visit. She has type 2 diabetes mellitus. There are no hypoglycemic associated symptoms. Associated symptoms include foot ulcerations. Pertinent negatives for diabetes include no polyphagia and no polyuria. There are no hypoglycemic complications. Diabetic complications include heart disease and PVD. Current diabetic treatment includes insulin injections (lantus humalog ). She is currently taking insulin pre-breakfast, pre-lunch, at bedtime and pre-dinner. Her overall blood glucose range is 140-180 mg/dl. (Glucose levels in 130-250 range   Last hbaic was 7.0 from 10/10/19 ) An ACE inhibitor/angiotensin II receptor blocker is being taken.      Lab Results   Component Value Date     11/21/2019    K 4.5 11/21/2019     11/21/2019    CO2 26 11/21/2019    BUN 19 11/21/2019    CREATININE 0.87 11/21/2019    GLUCOSE 201 01/02/2020    CALCIUM 9.0 11/21/2019    PROT 6.3 05/30/2019    LABALBU 3.1 (L) 05/30/2019    BILITOT 0.3 05/30/2019    ALKPHOS 71 05/30/2019    AST 12 05/30/2019    ALT 9 05/30/2019    LABGLOM >60.0 11/21/2019    GFRAA >60.0 11/21/2019    GLOB 3.2 05/30/2019         hypothyroidism on replacement with synthyroid 50 mcg dailt no labs to review       Patient Active Problem List   Diagnosis    Severe major depression with psychotic features ([address]    Type 2 diabetes mellitus with hyperglycemia, with long-term current use of insulin ([address]    HTN (hypertension)    HLD (hyperlipidemia)    Hypothyroid    HF (heart failure) ([address]    Non-pressure ulcer of toe (HCC)    Atherosclerotic PVD with intermittent claudication (HCC)    Acute osteomyelitis ([address]    Skin infection    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    Surgical wound dehiscence, lispro (HUMALOG) 100 UNIT/ML injection vial     Sig: Inject 20 Units into the skin 3 times daily (with meals)     Dispense:  1 vial     Refill:  3    insulin glargine (LANTUS) 100 UNIT/ML injection vial     Sig: Inject 10 Units into the skin nightly     Dispense:  1 vial     Refill:  3     Advised to check glucose 4 times a day   Will do 2 week cgm later   Continue synthroid 50 mcg daily   More than 50 % of 30 min spend in pt education/counsleing   Thanks dr Maureen Waterman for the referral         Giovanny Ovalle MD

## 2020-01-03 NOTE — PROGRESS NOTES
Jazz [address]ler [address]                                                   Progress Note and Procedure Note      Garth Mota RECORD NUMBER:  [id number]  AGE: 60 y.o. GENDER: female  : 1955  EPISODE DATE:  1/3/2020    Subjective:     Chief Complaint   Patient presents with    Wound Check     right 3rd toe         HISTORY of PRESENT ILLNESS HPI     Oksana Odonnell is a 60 y.o. female who presents today for wound/ulcer evaluation. History of Wound Context: Patient is a 66year old female with history of right foot non-healing diabetic right foot ulcer. Patient had CABG at [facility] and some peripheral intervention done at [facility]. She was hospitalized for 3 months and discharge in August.  Patient presents today with a non-healing right foot ulcer. No fever or chills. No rest pain. Patient was admitted in the beginning of Oct 2019 and had pci. Patient had follow up with cardiology after her PCI and said she is at acceptable risk for peripheral angiogram.    Patient started hbo therapy 12/2 week. Ms. Agustín Orlando has a past medical history of Asthma, CAD (coronary artery disease), Colitis, Diabetes mellitus ([address] Hyperlipidemia, Hypertension, Prolonged emergence from general anesthesia, PVD (peripheral vascular disease) ([facility] and Thyroid disease. She has a past surgical history that includes  section; Hysterectomy; Cardiac surgery; Coronary artery bypass graft (2019); Toe amputation (Right); and Colonoscopy (N/A, 2019). Her family history is not on file. Ms. Agustín Orlando reports that she has never smoked. She has never used smokeless tobacco. She reports that she does not drink alcohol or use drugs.     Wound/Ulcer Pain Timing/Severity: none  Quality of pain: N/A  Severity:  0 / 10   Modifying Factors: None  Associated Signs/Symptoms: edema    Ulcer Identification:  Ulcer Type: diabetic  Contributing Factors: edema    Wound: N/A        PAST MEDICAL HISTORY        Diagnosis Date    Asthma     CAD (coronary artery disease)     Colitis     Diabetes mellitus ([facility]     Hyperlipidemia     Hypertension     Prolonged emergence from general anesthesia     PVD (peripheral vascular disease) ([facility]     Thyroid disease        PAST SURGICAL HISTORY    Past Surgical History:   Procedure Laterality Date    CARDIAC SURGERY       SECTION      COLONOSCOPY N/A 2019    COLONOSCOPY DIAGNOSTIC performed by Gia Holly MD at [address] [place] [address] GRAFT  2019    unknown vessels    HYSTERECTOMY      TOE AMPUTATION Right     3rd toe       FAMILY HISTORY    No family history on file. SOCIAL HISTORY    Social History     Tobacco Use    Smoking status: Never Smoker    Smokeless tobacco: Never Used   Substance Use Topics    Alcohol use: Never     Frequency: Never    Drug use: Never       ALLERGIES    Allergies   Allergen Reactions    Ambien [Zolpidem Tartrate]     Capoten [Captopril]     Clioquinol     Cogentin [Benztropine]     Depakote [Divalproex Sodium]     Effexor Xr [Venlafaxine Hcl Er]     Geodon [Ziprasidone Hcl]     Lyrica [Pregabalin]     Navane [Thiothixene]     Pamelor [Nortriptyline Hcl]     Remeron [Mirtazapine]     Risperdal [Risperidone]     Trazodone And Nefazodone     Wellbutrin [Bupropion]        MEDICATIONS    Current Outpatient Medications on File Prior to Encounter   Medication Sig Dispense Refill    insulin lispro (HUMALOG) 100 UNIT/ML injection vial Inject 20 Units into the skin 3 times daily (with meals) 1 vial 3    insulin glargine (LANTUS) 100 UNIT/ML injection vial Inject 10 Units into the skin nightly 1 vial 3    gabapentin (NEURONTIN) 300 MG capsule Take 300 mg by mouth 3 times daily.  Indications: taking 400 mg       atorvastatin (LIPITOR) 40 MG tablet Take 40 mg by mouth daily      silver sulfADIAZINE (SILVADENE) 1 % cream Apply 1 % topically 2 times daily Indications: to negative  Respiratory: negative  Cardiovascular: negative  Behavioral/Psych: negative  Allergic/Immunologic: positive for ambien    Objective:      BP (!) 159/54   Pulse 58   Temp 97.6 °F (36.4 °C) (Temporal)   Resp 18   Ht 5' 4\" (1.626 m)   Wt 209 lb (94.8 kg)   BMI 35.87 kg/m²     Wt Readings from Last 3 Encounters:   01/03/20 209 lb (94.8 kg)   12/06/19 209 lb (94.8 kg)   11/22/19 209 lb (94.8 kg)       PHYSICAL EXAM    Constitutional:   Well nourished and well developed. Appears neat and clean. Patient is alert, oriented x3, and in no apparent distress. Respiratory:  Respiratory effort is easy and symmetric bilaterally. Rate is normal at rest and on room air. Vascular:  Pedal Pulses is not palpable and audible with doppler. Capillary refill is <3 sec to digits bilateral.  Extremities positivefor 1+ pitting edema. Neurological:   Sensation is decreased to lower extremities. Dermatological:  Wound description noted in wound assessment. The wound(s) are diabetic foot ulcer with gangrene requiring 3rd toe amputation now with exposed bone and tendon. Ulcer probes to bone. Psychiatric:  Judgement and insight intact. Short and long term memory intact. No evidence of depression, anxiety, or agitation. Patient is calm, cooperative, and communicative. Appropriate interactions and affect. Assessment:   Right foot non-healing ulcer     Procedure Note  Indications:  Based on my examination of this patient's wound(s)/ulcer(s) today, debridement is required to promote healing and evaluate the wound base. Performed by: Zhen Fuchs MD    Consent obtained:  Yes    Time out taken:  Yes    Pain Control: Anesthetic  Anesthetic: None       Debridement:Excisional Debridement    Using curette the wound(s)/ulcer(s) was/were sharply debrided down through and including the removal of subcutaneous tissue.         Devitalized Tissue Debrided:  fibrin, biofilm and slough    Pre Debridement 0  / 10     Post Procedural Pain:  0 / 10     Response to treatment:  Well tolerated by patient. Plan:   Right foot diabetic ulcer with severe pvd and ckd. Cont hbo. Will start santyl patient to receive medication today. F/u 1 wk. Patient wants to see endocrine to manage her blood sugar as it has been difficult to control. May need xray of foot to eval for osteo. [facility]    Physician Orders and Discharge Instructions  [facility]  [address] [facility]  [place], [address] [facility]  Telephone: [phone]      [phone]        NAME: Chidi Guthrie  DATE OF BIRTH:  1955  MEDICAL RECORD NUMBER:  [id number]     Home Care/Facility: [address]     Wound Location:  RIGHT THIRD TOE AMPUTATION SITE     Dressing orders: 1. Cleanse wound(s) with normal saline. 2. Apply dry SILVERCEL OR CALCIUM ALGINATE WITH Ag or eqivalent to wound bed. Gently tuck in undermining area. 3. Cover with 4x4's and wrap with gauze (makenzie or kerlix)  4. Change  Every other day or Monday, Wednesday, and Friday        Compression:  NONE      Offloading Device:  PATIENT TO WEAR OFFLOADING SHOE      Other Instructions:  PATIENT MAY AMBULATE.      Keep all dressings clean, dry and intact.  Keep pressure off the wound(s) at all times.      Follow up visit: 2  Weeks:  December 6 , 2019     Please give 24 hour notice if unable to keep appointment. [phone]     If you experience any of the following, please call the Wound Care Service at  [phone] or go to the nearest emergency room.     *Increase in pain         *Temperature over 101           *Increase in drainage from your wound or a foul odor  *Uncontrolled swelling            *Need for compression bandage changes due to slippage, breakthrough drainage     PLEASE NOTE: IF YOU ARE UNABLE TO OBTAIN WOUND SUPPLIES, CONTINUE TO USE THE SUPPLIES YOU HAVE AVAILABLE UNTIL YOU ARE ABLE TO [address].  IT IS MOST IMPORTANT TO KEEP THE WOUND COVERED AT ALL        Electronically signed by Shaan Queen MD on 1/3/2020 at 2:43 PM.

## 2020-01-06 ENCOUNTER — HOSPITAL ENCOUNTER (OUTPATIENT)
Dept: HYPERBARIC MEDICINE | Age: 65
Discharge: HOME | End: 2020-01-06
Payer: COMMERCIAL

## 2020-01-06 VITALS
HEART RATE: 72 BPM | RESPIRATION RATE: 18 BRPM | DIASTOLIC BLOOD PRESSURE: 83 MMHG | TEMPERATURE: 96.9 F | SYSTOLIC BLOOD PRESSURE: 164 MMHG

## 2020-01-06 LAB
ALBUMIN, U: <7 MG/L
ALBUMIN/CREATININE RATIO: NORMAL UG/MG CRT (ref 0–30)
ANION GAP SERPL CALCULATED.3IONS-SCNC: 15 MMOL/L (ref 10–20)
BICARBONATE: 27 MMOL/L (ref 21–32)
CALCIUM SERPL-MCNC: 9.4 MG/DL (ref 8.6–10.3)
CHLORIDE BLD-SCNC: 99 MMOL/L (ref 98–107)
CREAT SERPL-MCNC: 1.26 MG/DL (ref 0.5–1)
CREATININE URINE: 72.2 MG/DL (ref 20–320)
ESTIMATED AVERAGE GLUCOSE: 157 MG/DL
GFR AFRICAN AMERICAN: 52 ML/MIN/1.73M2
GFR NON-AFRICAN AMERICAN: 43 ML/MIN/1.73M2
GLUCOSE BLD-MCNC: 168 MG/DL (ref 60–115)
GLUCOSE BLD-MCNC: 308 MG/DL (ref 60–115)
GLUCOSE: 279 MG/DL (ref 74–99)
HBA1C MFR BLD: 7.1 %
PERFORMED ON: ABNORMAL
PERFORMED ON: ABNORMAL
POTASSIUM SERPL-SCNC: 4.5 MMOL/L (ref 3.5–5.3)
SODIUM BLD-SCNC: 136 MMOL/L (ref 136–145)
T4 FREE: 1.1 NG/DL (ref 0.61–1.1)
TSH SERPL DL<=0.05 MIU/L-ACNC: 1.26 MIU/L (ref 0.44–3.9)
UREA NITROGEN: 37 MG/DL (ref 6–23)

## 2020-01-06 PROCEDURE — G0277 HBOT, FULL BODY CHAMBER, 30M: HCPCS

## 2020-01-06 PROCEDURE — 82948 REAGENT STRIP/BLOOD GLUCOSE: CPT

## 2020-01-07 ENCOUNTER — HOSPITAL ENCOUNTER (OUTPATIENT)
Dept: HYPERBARIC MEDICINE | Age: 65
Discharge: HOME | End: 2020-01-07
Payer: COMMERCIAL

## 2020-01-07 VITALS
SYSTOLIC BLOOD PRESSURE: 110 MMHG | TEMPERATURE: 97.9 F | HEART RATE: 65 BPM | DIASTOLIC BLOOD PRESSURE: 60 MMHG | RESPIRATION RATE: 18 BRPM

## 2020-01-07 LAB
GLUCOSE BLD-MCNC: 177 MG/DL (ref 60–115)
GLUCOSE BLD-MCNC: 243 MG/DL (ref 60–115)
PERFORMED ON: ABNORMAL
PERFORMED ON: ABNORMAL

## 2020-01-07 PROCEDURE — G0277 HBOT, FULL BODY CHAMBER, 30M: HCPCS

## 2020-01-07 NOTE — PROGRESS NOTES
Hyperbaric Oxygen Therapy   Progress Note    NAME: Garth Mota RECORD NUMBER:  [id number]  AGE: 60 y.o. GENDER: female  : 1955  EPISODE DATE:  2020   Subjective   HBO Treatment Number: 40 out of Total Treatments: 20  HBO Diagnosis:   Hagen: (right foot third toe)  Indications: Lower Extremity Diabetic Wound ___(site)  Safety checks performed prior to treatment. See doc flowsheets for documentation. Objective        Recent Labs     20  1055 20  1317   POCGLU 243* 177*     Pre treatment Vital Signs       Temp: 99 °F (37.2 °C)     Pulse: 63     Resp: 18     BP: 110/60     Post treatment Vital Signs  Temp: 97.9 °F (36.6 °C)  Pulse: 65  Resp: 18  BP: 110/60  Assessment      Physical Exam:  General Appearance:  alert and oriented to person, place and time, well-developed and well-nourished, in no acute distress  ENT:  tympanic membranes intact bilaterally  Pulmonary/Chest:  clear to auscultation bilaterally- no wheezes, rales or rhonchi, normal air movement, no respiratory distress  Cardiovascular:  regular rate and rhythm  Chamber #: [id number]  Treatment Start Time: 1115     Pressure Reached Time: 1130  DOROTHY : 2  Number of Air Breaks:  Treatment Status: No Air break     Decompression Time: 1310      Length of Treatment: 90 Minutes  Symptoms Noted During Treatment: None    Adverse Event: no    I was present on these premises and immediately available to furnish assistance & direction throughout the procedure. Plan      Ace Arvizu is a 60 y.o. female  did successfully complete today's hyperbaric oxygen treatment at [address] [place] [address]. In my clinical judgement, ongoing HBO therapy is  necessary at this time, given a threat to patient function, limb or life from the current condition. Supervision and attendance of Hyperbaric Oxygen Therapy provided. Continue HBO treatment as outlined in the treatment plan.     Hyperbaric Oxygen: Yolabo Hawley tolerated Treatment Number: 40 well today without complications. Discharge Instructions were explained and given to MsGómez  Briana Buchanan     Electronically signed by ESDRAS Preciado NP on 1/7/2020 at 1:48 PM

## 2020-01-08 ENCOUNTER — HOSPITAL ENCOUNTER (OUTPATIENT)
Dept: HYPERBARIC MEDICINE | Age: 65
Discharge: HOME | End: 2020-01-08
Payer: COMMERCIAL

## 2020-01-08 VITALS
RESPIRATION RATE: 18 BRPM | SYSTOLIC BLOOD PRESSURE: 122 MMHG | TEMPERATURE: 97.1 F | HEART RATE: 60 BPM | DIASTOLIC BLOOD PRESSURE: 78 MMHG

## 2020-01-08 LAB
GLUCOSE BLD-MCNC: 160 MG/DL (ref 60–115)
GLUCOSE BLD-MCNC: 230 MG/DL (ref 60–115)
PERFORMED ON: ABNORMAL
PERFORMED ON: ABNORMAL

## 2020-01-08 PROCEDURE — G0277 HBOT, FULL BODY CHAMBER, 30M: HCPCS

## 2020-01-08 PROCEDURE — 99183 HYPERBARIC OXYGEN THERAPY: CPT | Performed by: NURSE PRACTITIONER

## 2020-01-08 NOTE — PROGRESS NOTES
Hyperbaric Oxygen Therapy   Progress Note    NAME: Garth Mota RECORD NUMBER:  [id number]  AGE: 60 y.o. GENDER: female  : 1955  EPISODE DATE:  2020   Subjective   HBO Treatment Number: 45 out of Total Treatments: 20  HBO Diagnosis:   Glenside Lacy: Glenside Lacy 3(right foot third toe )   Indications: Lower Extremity Diabetic Wound ___(site)  Safety checks performed prior to treatment. See doc flowsheets for documentation. Objective        Recent Labs     20  1038 20  1254   POCGLU 230* 160*     Pre treatment Vital Signs       Temp: 97.6 °F (36.4 °C)     Pulse: 70     Resp: 18     BP: 136/60     Post treatment Vital Signs  Temp: 97.1 °F (36.2 °C)  Pulse: 60  Resp: 18  BP: 122/78  Assessment      Physical Exam:  General Appearance:  alert and oriented to person, place and time, well-developed and well-nourished, in no acute distress  ENT:  tympanic membranes intact bilaterally. Pre and Post dive TEED scores are normal bilaterally. Pulmonary/Chest:  clear to auscultation bilaterally- no wheezes, rales or rhonchi, normal air movement, no respiratory distress  Cardiovascular:  regular rate and rhythm, no murmurs rubs or gallops  Chamber #: [id number]  Treatment Start Time: 1100     Pressure Reached Time: 1115  DOROTHY : 2  Number of Air Breaks:  Treatment Status: No Air break     Decompression Time: 1230   Treatment End Time: 1245  Length of Treatment: 90 Minutes  Symptoms Noted During Treatment: None    Adverse Event: no    I was present on these premises and immediately available to furnish assistance & direction throughout the procedure. Brenda      Milton Condon is a 60 y.o. female  did successfully complete today's hyperbaric oxygen treatment number  at [address]. In my clinical judgement, ongoing HBO therapy is  necessary at this time, given a threat to patient function, limb or life from the current condition.       Supervision and

## 2020-01-09 ENCOUNTER — HOSPITAL ENCOUNTER (OUTPATIENT)
Dept: HYPERBARIC MEDICINE | Age: 65
Discharge: HOME | End: 2020-01-09
Payer: COMMERCIAL

## 2020-01-09 VITALS
SYSTOLIC BLOOD PRESSURE: 110 MMHG | DIASTOLIC BLOOD PRESSURE: 64 MMHG | RESPIRATION RATE: 18 BRPM | TEMPERATURE: 98.8 F | HEART RATE: 66 BPM

## 2020-01-09 LAB
GLUCOSE BLD-MCNC: 145 MG/DL (ref 60–115)
GLUCOSE BLD-MCNC: 264 MG/DL (ref 60–115)
PERFORMED ON: ABNORMAL
PERFORMED ON: ABNORMAL

## 2020-01-09 PROCEDURE — G0277 HBOT, FULL BODY CHAMBER, 30M: HCPCS

## 2020-01-09 NOTE — PROGRESS NOTES
Hyperbaric Oxygen Therapy   Progress Note    NAME: Garth Mota RECORD NUMBER:  [id number]  AGE: 60 y.o. GENDER: female  : 1955  EPISODE DATE:  2020   Subjective   HBO Treatment Number: 26 out of Total Treatments: 40  HBO Diagnosis:   Jeneal Endo: Jeneal Endo 3(right foot third toe)  Indications: Lower Extremity Diabetic Wound ___(site)  Safety checks performed prior to treatment. See doc flowsheets for documentation. Objective        Recent Labs     20  1140 20  1353   POCGLU 264* 145*     Pre treatment Vital Signs       Temp: 97.6 °F (36.4 °C)     Pulse: 68     Resp: 18     BP: 118/60     Post treatment Vital Signs  Temp: 98.8 °F (37.1 °C)  Pulse: 66  Resp: 18  BP: 110/64  Assessment      Physical Exam:  General Appearance:  alert and oriented to person, place and time, well-developed and well-nourished, in no acute distress  ENT:  tympanic membranes intact bilaterally  Pulmonary/Chest:  clear to auscultation bilaterally- no wheezes, rales or rhonchi, normal air movement, no respiratory distress  Cardiovascular:  regular rate and rhythm  Chamber #: [id number]              Number of Air Breaks: Adverse Event: no    I was present on these premises and immediately available to furnish assistance & direction throughout the procedure. Plan      Yola Hawley is a 60 y.o. female  did successfully complete today's hyperbaric oxygen treatment at [address] [facility]. In my clinical judgement, ongoing HBO therapy is  necessary at this time, given a threat to patient function, limb or life from the current condition. Supervision and attendance of Hyperbaric Oxygen Therapy provided. Continue HBO treatment as outlined in the treatment plan. Hyperbaric Oxygen: Yola Hawley tolerated Treatment Number: 26 well today without complications. Discharge Instructions were explained and given to Ms. Briana Buchanan Electronically signed by ESDRAS Nguyen NP on 1/9/2020 at 2:12 PM

## 2020-01-10 ENCOUNTER — HOSPITAL ENCOUNTER (OUTPATIENT)
Dept: HYPERBARIC MEDICINE | Age: 65
Discharge: HOME | End: 2020-01-10
Payer: COMMERCIAL

## 2020-01-10 ENCOUNTER — HOSPITAL ENCOUNTER (OUTPATIENT)
Dept: GENERAL RADIOLOGY | Age: 65
Discharge: HOME | End: 2020-01-12
Payer: COMMERCIAL

## 2020-01-10 ENCOUNTER — HOSPITAL ENCOUNTER (OUTPATIENT)
Dept: WOUND CARE | Age: 65
Discharge: HOME | End: 2020-01-10
Payer: COMMERCIAL

## 2020-01-10 VITALS
TEMPERATURE: 96.6 F | RESPIRATION RATE: 18 BRPM | SYSTOLIC BLOOD PRESSURE: 162 MMHG | HEART RATE: 72 BPM | DIASTOLIC BLOOD PRESSURE: 80 MMHG

## 2020-01-10 VITALS
SYSTOLIC BLOOD PRESSURE: 162 MMHG | HEART RATE: 72 BPM | RESPIRATION RATE: 18 BRPM | DIASTOLIC BLOOD PRESSURE: 80 MMHG | TEMPERATURE: 96.6 F

## 2020-01-10 LAB
GLUCOSE BLD-MCNC: 213 MG/DL
GLUCOSE BLD-MCNC: 288 MG/DL
PERFORMED ON: NORMAL
PERFORMED ON: NORMAL

## 2020-01-10 PROCEDURE — 99213 OFFICE O/P EST LOW 20 MIN: CPT

## 2020-01-10 PROCEDURE — 99183 HYPERBARIC OXYGEN THERAPY: CPT | Performed by: NURSE PRACTITIONER

## 2020-01-10 PROCEDURE — G0277 HBOT, FULL BODY CHAMBER, 30M: HCPCS

## 2020-01-10 PROCEDURE — 99213 OFFICE O/P EST LOW 20 MIN: CPT | Performed by: SURGERY

## 2020-01-10 PROCEDURE — 73630 X-RAY EXAM OF FOOT: CPT

## 2020-01-10 NOTE — PROGRESS NOTES
Hyperbaric Oxygen Therapy   Progress Note    NAME: Garth Mota RECORD NUMBER:  [id number]  AGE: 60 y.o. GENDER: female  : 1955  EPISODE DATE:  1/10/2020   Subjective   HBO Treatment Number: 27 out of Total Treatments: 40  HBO Diagnosis:   Digna Siegel: Hagen 3(right foot third toe) non healing ulcer     Safety checks performed prior to treatment. See doc flowsheets for documentation. Objective        Recent Labs     20  1353 01/10/20  1047   POCGLU 145* 288     Pre treatment Vital Signs       Temp: 97.6 °F (36.4 °C)     Pulse: 78     Resp: 18     BP: (!) 145/50     Post treatment Vital Signs  Temp: 96.6 °F (35.9 °C)  Pulse: 72  Resp: 18  BP: (!) 162/80  Assessment      Physical Exam:  General Appearance:  alert and oriented to person, place and time, well-developed and well-nourished, in no acute distress  ENT:  tympanic membranes intact bilaterally both pre and post dive with TEED scores normal bilaterally   Pulmonary/Chest:  clear to auscultation bilaterally- no wheezes, rales or rhonchi, normal air movement, no respiratory distress  Cardiovascular:  regular rate and rhythm, no murmurs rubs or gallops  Chamber #: [id number]  Treatment Start Time: 1045     Pressure Reached Time: 1055  DOROTHY : 2  Number of Air Breaks:  Treatment Status: No Air break     Decompression Time: 1325   Treatment End Time: 1340  Length of Treatment: 90 Minutes  Symptoms Noted During Treatment: None    Adverse Event: no    I was present on these premises and immediately available to furnish assistance & direction throughout the procedure. Brenda      Sky Franz is a 60 y.o. female  did successfully complete today's hyperbaric oxygen treatment at [address] [place] [address]. In my clinical judgement, ongoing HBO therapy is  necessary at this time, given a threat to patient function, limb or life from the current condition.       Supervision and attendance of Hyperbaric Oxygen

## 2020-01-10 NOTE — PROGRESS NOTES
J[facility]ler [address]                                                   Progress Note and Procedure Note      Garth Mota RECORD NUMBER:  [id number]  AGE: 60 y.o. GENDER: female  : 1955  EPISODE DATE:  1/10/2020    Subjective:     Chief Complaint   Patient presents with    Wound Check     right foot wound         HISTORY of PRESENT ILLNESS HPI     Ace Arvizu is a 60 y.o. female who presents today for wound/ulcer evaluation. History of Wound Context: Patient is a 66year old female with history of right foot non-healing diabetic right foot ulcer. Patient had CABG at [facility] and some peripheral intervention done at [facility]. She was hospitalized for 3 months and discharge in August.  Patient presents today with a non-healing right foot ulcer. No fever or chills. No rest pain. Patient was admitted in the beginning of Oct 2019 and had pci. Patient had follow up with cardiology after her PCI and said she is at acceptable risk for peripheral angiogram.    Patient started hbo therapy 12/2 week. Ms. Theo Weber has a past medical history of Asthma, CAD (coronary artery disease), Colitis, Diabetes mellitus ([address] Hyperlipidemia, Hypertension, Prolonged emergence from general anesthesia, PVD (peripheral vascular disease) ([address] and Thyroid disease. She has a past surgical history that includes  section; Hysterectomy; Cardiac surgery; Coronary artery bypass graft (2019); Toe amputation (Right); and Colonoscopy (N/A, 2019). Her family history is not on file. Ms. Theo Weber reports that she has never smoked. She has never used smokeless tobacco. She reports that she does not drink alcohol or use drugs.     Wound/Ulcer Pain Timing/Severity: none  Quality of pain: N/A  Severity:  0 / 10   Modifying Factors: None  Associated Signs/Symptoms: edema    Ulcer Identification:  Ulcer Type: diabetic  Contributing Factors: edema    Wound: N/A        PAST MEDICAL HISTORY        Diagnosis Date    Asthma     CAD (coronary artery disease)     Colitis     Diabetes mellitus ([facility]     Hyperlipidemia     Hypertension     Prolonged emergence from general anesthesia     PVD (peripheral vascular disease) ([facility]     Thyroid disease        PAST SURGICAL HISTORY    Past Surgical History:   Procedure Laterality Date    CARDIAC SURGERY       SECTION      COLONOSCOPY N/A 2019    COLONOSCOPY DIAGNOSTIC performed by Carol Rubio MD at [address] [place] [address] GRAFT  2019    unknown vessels    HYSTERECTOMY      TOE AMPUTATION Right     3rd toe       FAMILY HISTORY    History reviewed. No pertinent family history. SOCIAL HISTORY    Social History     Tobacco Use    Smoking status: Never Smoker    Smokeless tobacco: Never Used   Substance Use Topics    Alcohol use: Never     Frequency: Never    Drug use: Never       ALLERGIES    Allergies   Allergen Reactions    Ambien [Zolpidem Tartrate]     Capoten [Captopril]     Clioquinol     Cogentin [Benztropine]     Depakote [Divalproex Sodium]     Effexor Xr [Venlafaxine Hcl Er]     Geodon [Ziprasidone Hcl]     Lyrica [Pregabalin]     Navane [Thiothixene]     Pamelor [Nortriptyline Hcl]     Remeron [Mirtazapine]     Risperdal [Risperidone]     Trazodone And Nefazodone     Wellbutrin [Bupropion]        MEDICATIONS    Current Outpatient Medications on File Prior to Encounter   Medication Sig Dispense Refill    insulin lispro (HUMALOG) 100 UNIT/ML injection vial Inject 20 Units into the skin 3 times daily (with meals) 1 vial 3    insulin glargine (LANTUS) 100 UNIT/ML injection vial Inject 10 Units into the skin nightly 1 vial 3    gabapentin (NEURONTIN) 300 MG capsule Take 300 mg by mouth 3 times daily.  Indications: taking 400 mg       atorvastatin (LIPITOR) 40 MG tablet Take 40 mg by mouth daily      silver sulfADIAZINE (SILVADENE) 1 % cream Apply 1 % topically 2 times daily Change  Every other day or Monday, Wednesday, and Friday        Compression:  NONE      Offloading Device:  PATIENT TO WEAR OFFLOADING SHOE      Other Instructions:  PATIENT MAY AMBULATE.      Keep all dressings clean, dry and intact.  Keep pressure off the wound(s) at all times.      Follow up visit: 2  Weeks:  December 6 , 2019     Please give 24 hour notice if unable to keep appointment. [phone]     If you experience any of the following, please call the Wound Care Service at  [phone] or go to the nearest emergency room.     *Increase in pain         *Temperature over 101           *Increase in drainage from your wound or a foul odor  *Uncontrolled swelling            *Need for compression bandage changes due to slippage, breakthrough drainage     PLEASE NOTE: IF YOU ARE UNABLE TO OBTAIN WOUND SUPPLIES, CONTINUE TO USE THE SUPPLIES YOU HAVE AVAILABLE UNTIL YOU ARE ABLE TO [address] [facility] [address].  IT IS MOST IMPORTANT TO KEEP THE WOUND COVERED AT ALL        Electronically signed by Taj Pollack MD on 1/10/2020 at 4:24 PM.

## 2020-01-13 ENCOUNTER — HOSPITAL ENCOUNTER (OUTPATIENT)
Dept: HYPERBARIC MEDICINE | Age: 65
Discharge: HOME | End: 2020-01-13
Payer: COMMERCIAL

## 2020-01-13 VITALS
SYSTOLIC BLOOD PRESSURE: 116 MMHG | TEMPERATURE: 97.5 F | HEART RATE: 76 BPM | DIASTOLIC BLOOD PRESSURE: 62 MMHG | RESPIRATION RATE: 8 BRPM

## 2020-01-13 LAB
GLUCOSE BLD-MCNC: 184 MG/DL (ref 60–115)
GLUCOSE BLD-MCNC: 295 MG/DL (ref 60–115)
PERFORMED ON: ABNORMAL
PERFORMED ON: ABNORMAL

## 2020-01-13 PROCEDURE — G0277 HBOT, FULL BODY CHAMBER, 30M: HCPCS

## 2020-01-13 NOTE — PLAN OF CARE
Problem: Wound:  Goal: Will show signs of wound healing; wound closure and no evidence of infection  Description  Will show signs of wound healing; wound closure and no evidence of infection  Outcome: Ongoing     Problem: Blood Glucose:  Goal: Ability to maintain appropriate glucose levels will improve  Description  Ability to maintain appropriate glucose levels will improve  Outcome: Ongoing     Problem: Physical Regulation:  Goal: Tolerate HBO therapy and barotrauma will be prevented  Description  Tolerate HBO therapy and barotrauma will be prevented  Outcome: Ongoing

## 2020-01-13 NOTE — PROGRESS NOTES
Number: 28 well today without complications. Discharge Instructions were explained and given to Ms. Lopeztracy Deepthi     Electronically signed by ESDRAS Weaver NP on 1/13/2020 at 1:55 PM

## 2020-01-14 ENCOUNTER — HOSPITAL ENCOUNTER (OUTPATIENT)
Dept: HYPERBARIC MEDICINE | Age: 65
Discharge: HOME | End: 2020-01-14
Payer: COMMERCIAL

## 2020-01-14 VITALS
SYSTOLIC BLOOD PRESSURE: 130 MMHG | HEART RATE: 62 BPM | TEMPERATURE: 97.5 F | DIASTOLIC BLOOD PRESSURE: 60 MMHG | RESPIRATION RATE: 18 BRPM

## 2020-01-14 LAB
GLUCOSE BLD-MCNC: 186 MG/DL (ref 60–115)
GLUCOSE BLD-MCNC: 285 MG/DL (ref 60–115)
PERFORMED ON: ABNORMAL
PERFORMED ON: ABNORMAL

## 2020-01-14 PROCEDURE — G0277 HBOT, FULL BODY CHAMBER, 30M: HCPCS

## 2020-01-14 NOTE — PROGRESS NOTES
Hyperbaric Oxygen Therapy   Progress Note    NAME: Garth Mota RECORD NUMBER:  [id number]  AGE: 60 y.o. GENDER: female  : 1955  EPISODE DATE:  2020   Subjective   HBO Treatment Number: 29 out of Total Treatments: 40  HBO Diagnosis:   Krystal Fraise: Hagen 3(fird foot right toe)  Indications: Lower Extremity Diabetic Wound ___(site)  Safety checks performed prior to treatment. See doc flowsheets for documentation. Objective        Recent Labs     20  1021 20  1238   POCGLU 285* 186*     Pre treatment Vital Signs       Temp: 97.7 °F (36.5 °C)     Pulse: 66     Resp: 18     BP: 118/64     Post treatment Vital Signs  Temp: 97.5 °F (36.4 °C)  Pulse: 62  Resp: 18  BP: 130/60  Assessment      Physical Exam:  General Appearance:  alert and oriented to person, place and time, well-developed and well-nourished, in no acute distress  ENT:  tympanic membranes intact bilaterally  Pulmonary/Chest:  clear to auscultation bilaterally- no wheezes, rales or rhonchi, normal air movement, no respiratory distress  Cardiovascular:  regular rate and rhythm  Chamber #: [id number]  Treatment Start Time: 1040     Pressure Reached Time: 1055  DOROTHY : 2  Number of Air Breaks:  Treatment Status: No Air break     Decompression Time: 1220   Treatment End Time: 4641  Length of Treatment: 90 Minutes  Symptoms Noted During Treatment: None    Adverse Event: no    I was present on these premises and immediately available to furnish assistance & direction throughout the procedure. Brenda Archer is a 60 y.o. female  did successfully complete today's hyperbaric oxygen treatment at [address] [place] [address]. In my clinical judgement, ongoing HBO therapy is  necessary at this time, given a threat to patient function, limb or life from the current condition. Supervision and attendance of Hyperbaric Oxygen Therapy provided.   Continue HBO treatment as outlined in the treatment plan. Hyperbaric Oxygen: Vane Pelt tolerated Treatment Number: 34 well today without complications. Discharge Instructions were explained and given to Ms.  Mary Gardenia     Electronically signed by ESDRAS Langston NP on 1/14/2020 at 1:11 PM

## 2020-01-16 ENCOUNTER — HOSPITAL ENCOUNTER (OUTPATIENT)
Dept: HYPERBARIC MEDICINE | Age: 65
Discharge: HOME | End: 2020-01-16
Payer: COMMERCIAL

## 2020-01-16 VITALS
DIASTOLIC BLOOD PRESSURE: 70 MMHG | SYSTOLIC BLOOD PRESSURE: 124 MMHG | RESPIRATION RATE: 18 BRPM | HEART RATE: 68 BPM | TEMPERATURE: 96.8 F

## 2020-01-16 LAB
GLUCOSE BLD-MCNC: 160 MG/DL (ref 60–115)
GLUCOSE BLD-MCNC: 233 MG/DL (ref 60–115)
PERFORMED ON: ABNORMAL
PERFORMED ON: ABNORMAL

## 2020-01-16 PROCEDURE — 99183 HYPERBARIC OXYGEN THERAPY: CPT | Performed by: SURGERY

## 2020-01-16 PROCEDURE — G0277 HBOT, FULL BODY CHAMBER, 30M: HCPCS

## 2020-01-16 NOTE — PROGRESS NOTES
Hyperbaric Oxygen Therapy   Progress Note    NAME: Garth Mota RECORD NUMBER:  [id number]  AGE: 60 y.o. GENDER: female  : 1955  EPISODE DATE:  2020   Subjective   HBO Treatment Number: 30 out of Total Treatments: 40  HBO Diagnosis:   Camdylan Person: Hagen 3(right foot third toe )  Indications: Lower Extremity Diabetic Wound ___(site)  Safety checks performed prior to treatment. See doc flowsheets for documentation. Objective        Recent Labs     20  1020 20  1230   POCGLU 233* 160*     Pre treatment Vital Signs       Temp: 97.6 °F (36.4 °C)     Pulse: 72     Resp: 18     BP: 116/72     Post treatment Vital Signs  Temp: 96.8 °F (36 °C)  Pulse: 68  Resp: 18  BP: 124/70  Assessment      Physical Exam:  General Appearance:  alert and oriented to person, place and time, well-developed and well-nourished, in no acute distress  ENT:  tympanic membranes intact bilaterally, normal color  Pulmonary/Chest:  clear to auscultation bilaterally- no wheezes, rales or rhonchi, normal air movement, no respiratory distress  Cardiovascular:  normal, regular rate and rhythm  Chamber #: [id number]  Treatment Start Time: 1035     Pressure Reached Time: 1050  DOROTHY : 2  Number of Air Breaks:  Treatment Status: No Air break     Decompression Time: 1230   Treatment End Time: 1230  Length of Treatment: 90 Minutes  Symptoms Noted During Treatment: None    Adverse Event: no    I was present on these premises and immediately available to furnish assistance & direction throughout the procedure. Brenda Giraldo is a 60 y.o. female  did successfully complete today's hyperbaric oxygen treatment at [address] [place] [address]. In my clinical judgement, ongoing HBO therapy is  necessary at this time, given a threat to patient function, limb or life from the current condition. Supervision and attendance of Hyperbaric Oxygen Therapy provided.   Continue HBO treatment as

## 2020-01-17 ENCOUNTER — HOSPITAL ENCOUNTER (OUTPATIENT)
Dept: HYPERBARIC MEDICINE | Age: 65
Discharge: HOME | End: 2020-01-17
Payer: COMMERCIAL

## 2020-01-17 VITALS
DIASTOLIC BLOOD PRESSURE: 78 MMHG | SYSTOLIC BLOOD PRESSURE: 132 MMHG | TEMPERATURE: 96.6 F | HEART RATE: 68 BPM | RESPIRATION RATE: 18 BRPM

## 2020-01-17 LAB
GLUCOSE BLD-MCNC: 261 MG/DL (ref 60–115)
GLUCOSE BLD-MCNC: 305 MG/DL (ref 60–115)
PERFORMED ON: ABNORMAL
PERFORMED ON: ABNORMAL

## 2020-01-17 PROCEDURE — 99183 HYPERBARIC OXYGEN THERAPY: CPT | Performed by: NURSE PRACTITIONER

## 2020-01-17 PROCEDURE — G0277 HBOT, FULL BODY CHAMBER, 30M: HCPCS

## 2020-01-17 NOTE — PROGRESS NOTES
Hyperbaric Oxygen Therapy   Progress Note    NAME: Garth Mota RECORD NUMBER:  [id number]  AGE: 60 y.o. GENDER: female  : 1955  EPISODE DATE:  2020   Subjective   HBO Treatment Number: 31 out of Total Treatments: 40  HBO Diagnosis:   Christen Hull: Christen Hull 3(right foot third toe)  Indications: Lower Extremity Diabetic Wound ___(site)  Safety checks performed prior to treatment. See doc flowsheets for documentation. Objective        Recent Labs     20  1151 20  1354   POCGLU 305* 261*     Pre treatment Vital Signs       Temp: 97.7 °F (36.5 °C)     Pulse: 72     Resp: 18     BP: 122/64     Post treatment Vital Signs  Temp: 96.6 °F (35.9 °C)  Pulse: 68  Resp: 18  BP: 132/78  Assessment      Physical Exam:  General Appearance:  alert and oriented to person, place and time, well-developed and well-nourished, in no acute distress  ENT:  tympanic membranes intact bilaterally with pre and post dive TEED scores normal   Pulmonary/Chest:  clear to auscultation bilaterally- no wheezes, rales or rhonchi, normal air movement, no respiratory distress  Cardiovascular:  regular rate and rhythm, no murmurs rubs or gallops  Chamber #: [id number]  Treatment Start Time: 1200     Pressure Reached Time: 1210  DOROTHY : 2  Number of Air Breaks:  Treatment Status: No Air break         Treatment End Time: 3134  Length of Treatment: 90 Minutes  Symptoms Noted During Treatment: None    Adverse Event: no    I was present on these premises and immediately available to furnish assistance & direction throughout the procedure. Brenda Godfrey is a 60 y.o. female  did successfully complete today's hyperbaric oxygen treatment at [address] [place] [address]. In my clinical judgement, ongoing HBO therapy is  necessary at this time, given a threat to patient function, limb or life from the current condition. Supervision and attendance of Hyperbaric Oxygen Therapy provided. Continue HBO treatment as outlined in the treatment plan. Hyperbaric Oxygen: Gaviota Wetzel tolerated Treatment Number: 74 well today without complications. Discharge Instructions were explained and given to Ms.  Jesus Manuel Fischer     Electronically signed by ESDRAS Santa NP on 1/17/2020 at 4:48 PM

## 2020-01-20 ENCOUNTER — HOSPITAL ENCOUNTER (OUTPATIENT)
Dept: HYPERBARIC MEDICINE | Age: 65
Discharge: HOME | End: 2020-01-20
Payer: COMMERCIAL

## 2020-01-20 VITALS
DIASTOLIC BLOOD PRESSURE: 80 MMHG | TEMPERATURE: 98.3 F | HEART RATE: 68 BPM | RESPIRATION RATE: 18 BRPM | SYSTOLIC BLOOD PRESSURE: 128 MMHG

## 2020-01-20 LAB
GLUCOSE BLD-MCNC: 101 MG/DL (ref 60–115)
GLUCOSE BLD-MCNC: 248 MG/DL (ref 60–115)
PERFORMED ON: ABNORMAL
PERFORMED ON: NORMAL

## 2020-01-20 PROCEDURE — G0277 HBOT, FULL BODY CHAMBER, 30M: HCPCS

## 2020-01-21 ENCOUNTER — HOSPITAL ENCOUNTER (OUTPATIENT)
Dept: HYPERBARIC MEDICINE | Age: 65
Discharge: HOME | End: 2020-01-21
Payer: COMMERCIAL

## 2020-01-21 VITALS
HEART RATE: 61 BPM | DIASTOLIC BLOOD PRESSURE: 68 MMHG | SYSTOLIC BLOOD PRESSURE: 122 MMHG | RESPIRATION RATE: 18 BRPM | TEMPERATURE: 97 F

## 2020-01-21 LAB
GLUCOSE BLD-MCNC: 116 MG/DL (ref 60–115)
GLUCOSE BLD-MCNC: 195 MG/DL (ref 60–115)
GLUCOSE BLD-MCNC: 76 MG/DL (ref 60–115)
PERFORMED ON: ABNORMAL
PERFORMED ON: ABNORMAL
PERFORMED ON: NORMAL

## 2020-01-21 PROCEDURE — G0277 HBOT, FULL BODY CHAMBER, 30M: HCPCS

## 2020-01-21 PROCEDURE — 99183 HYPERBARIC OXYGEN THERAPY: CPT | Performed by: SURGERY

## 2020-01-22 ENCOUNTER — HOSPITAL ENCOUNTER (OUTPATIENT)
Dept: HYPERBARIC MEDICINE | Age: 65
Discharge: HOME | End: 2020-01-22
Payer: COMMERCIAL

## 2020-01-22 VITALS
HEART RATE: 76 BPM | SYSTOLIC BLOOD PRESSURE: 118 MMHG | RESPIRATION RATE: 18 BRPM | DIASTOLIC BLOOD PRESSURE: 70 MMHG | TEMPERATURE: 96.8 F

## 2020-01-22 LAB
GLUCOSE BLD-MCNC: 131 MG/DL (ref 60–115)
GLUCOSE BLD-MCNC: 184 MG/DL (ref 60–115)
PERFORMED ON: ABNORMAL
PERFORMED ON: ABNORMAL

## 2020-01-22 PROCEDURE — G0277 HBOT, FULL BODY CHAMBER, 30M: HCPCS

## 2020-01-22 PROCEDURE — 99183 HYPERBARIC OXYGEN THERAPY: CPT | Performed by: NURSE PRACTITIONER

## 2020-01-22 NOTE — PROGRESS NOTES
Hyperbaric Oxygen Therapy   Progress Note    NAME: Garth Mota RECORD NUMBER:  [id number]  AGE: 60 y.o. GENDER: female  : 1955  EPISODE DATE:  2020   Subjective   HBO Treatment Number: 29 out of 40  HBO Diagnosis:   Ventura Macdonald: Hagen 3(right foot third toe )  Indications: Lower Extremity Diabetic Wound ___(site)  Safety checks performed prior to treatment. See doc flowsheets for documentation. Objective        Recent Labs     20  1041 20  1302   POCGLU 184* 131*     Pre treatment Vital Signs       Temp: 96.9 °F (36.1 °C)     Pulse: 70     Resp: 18     BP: 124/72     Post treatment Vital Signs  Temp: 96.8 °F (36 °C)  Pulse: 76  Resp: 18  BP: 118/70  Assessment      Physical Exam:  General Appearance:  alert and oriented to person, place and time, well-developed and well-nourished, in no acute distress  ENT:  tympanic membranes intact bilaterally, normal color  Pulmonary/Chest:  clear to auscultation bilaterally- no wheezes, rales or rhonchi, normal air movement, no respiratory distress  Cardiovascular:  regular rate and rhythm, no murmurs rubs or gallops  Chamber #: [id number]  Treatment Start Time: 1050     Pressure Reached Time: 1110  DOROTHY : 2  Number of Air Breaks:  Treatment Status: No Air break     Decompression Time: 1240   Treatment End Time: 1255     Symptoms Noted During Treatment: None    Adverse Event: no    I was present on these premises and immediately available to furnish assistance & direction throughout the procedure. Plan      Essenec Garcia is a 60 y.o. female  did successfully complete today's hyperbaric oxygen treatment at [address] [place] [address]. In my clinical judgement, ongoing HBO therapy is  necessary at this time, given a threat to patient function, limb or life from the current condition. Supervision and attendance of Hyperbaric Oxygen Therapy provided.   Continue HBO treatment as outlined in the treatment

## 2020-01-23 ENCOUNTER — HOSPITAL ENCOUNTER (OUTPATIENT)
Dept: HYPERBARIC MEDICINE | Age: 65
Discharge: HOME | End: 2020-01-23
Payer: COMMERCIAL

## 2020-01-23 VITALS
SYSTOLIC BLOOD PRESSURE: 116 MMHG | DIASTOLIC BLOOD PRESSURE: 62 MMHG | HEART RATE: 70 BPM | TEMPERATURE: 97.4 F | RESPIRATION RATE: 18 BRPM

## 2020-01-23 LAB
GLUCOSE BLD-MCNC: 123 MG/DL (ref 60–115)
GLUCOSE BLD-MCNC: 231 MG/DL (ref 60–115)
PERFORMED ON: ABNORMAL
PERFORMED ON: ABNORMAL

## 2020-01-23 PROCEDURE — G0277 HBOT, FULL BODY CHAMBER, 30M: HCPCS

## 2020-01-24 ENCOUNTER — HOSPITAL ENCOUNTER (OUTPATIENT)
Dept: HYPERBARIC MEDICINE | Age: 65
Discharge: HOME | End: 2020-01-24
Payer: COMMERCIAL

## 2020-01-24 ENCOUNTER — HOSPITAL ENCOUNTER (OUTPATIENT)
Dept: WOUND CARE | Age: 65
Discharge: HOME | End: 2020-01-24
Payer: COMMERCIAL

## 2020-01-24 VITALS
RESPIRATION RATE: 18 BRPM | DIASTOLIC BLOOD PRESSURE: 60 MMHG | TEMPERATURE: 97.3 F | HEART RATE: 70 BPM | SYSTOLIC BLOOD PRESSURE: 104 MMHG

## 2020-01-24 VITALS — RESPIRATION RATE: 18 BRPM | TEMPERATURE: 97.6 F | SYSTOLIC BLOOD PRESSURE: 104 MMHG | DIASTOLIC BLOOD PRESSURE: 60 MMHG

## 2020-01-24 LAB
GLUCOSE BLD-MCNC: 230 MG/DL (ref 60–115)
GLUCOSE BLD-MCNC: 297 MG/DL (ref 60–115)
GLUCOSE BLD-MCNC: 74 MG/DL (ref 60–115)
GLUCOSE BLD-MCNC: 85 MG/DL (ref 60–115)
PERFORMED ON: ABNORMAL
PERFORMED ON: ABNORMAL
PERFORMED ON: NORMAL
PERFORMED ON: NORMAL

## 2020-01-24 PROCEDURE — G0277 HBOT, FULL BODY CHAMBER, 30M: HCPCS

## 2020-01-24 PROCEDURE — 99213 OFFICE O/P EST LOW 20 MIN: CPT

## 2020-01-24 ASSESSMENT — PAIN DESCRIPTION - PAIN TYPE: TYPE: ACUTE PAIN

## 2020-01-24 ASSESSMENT — PAIN DESCRIPTION - LOCATION: LOCATION: TOE (COMMENT WHICH ONE)

## 2020-01-24 ASSESSMENT — PAIN SCALES - GENERAL: PAINLEVEL_OUTOF10: 3

## 2020-01-24 NOTE — PROGRESS NOTES
Hyperbaric Oxygen Therapy   Progress Note    NAME: Garth Mota RECORD NUMBER:  [id number]  AGE: 60 y.o. GENDER: female  : 1955  EPISODE DATE:  2020   Subjective   HBO Treatment Number: 36 out of Total Treatments: 40  HBO Diagnosis:   Bulah Friday:  3(right third toe )  Indications: Lower Extremity Diabetic Wound ___(site)  Safety checks performed prior to treatment. See doc flowsheets for documentation. Objective        Recent Labs     20  1336 20  1548   POCGLU 85 230*     Pre treatment Vital Signs       Temp: 96.7 °F (35.9 °C)     Pulse: 70     Resp: 18     BP: 126/62     Post treatment Vital Signs  Temp: 97.3 °F (36.3 °C)  Pulse: 70  Resp: 18  BP: 104/60  Assessment      Physical Exam:  General Appearance:  alert and oriented to person, place and time, well-developed and well-nourished, in no acute distress  ENT:  tympanic membranes intact bilaterally  Pulmonary/Chest:  clear to auscultation bilaterally- no wheezes, rales or rhonchi, normal air movement, no respiratory distress  Cardiovascular:  regular rate and rhythm  Chamber #: [id number]  Treatment Start Time: 1140     Pressure Reached Time: 1155  DOROTHY : 2  Number of Air Breaks:  Treatment Status: No Air break     Decompression Time: 1305   Treatment End Time: 1320  Treatment time 60 min  Symptoms Noted During Treatment: Restlessness and irritability    Adverse Event: yes    Approximately 1 hour into HBO today, this patient became very anxious. C/O sweating and feeling warm. She drank a glucerna in chamber as directed. Temperatures were adjusted, however, her anxiety increased and dive was terminated. On removal from chamber her Blood glucose was 74. BP was 102/60 HR 80. PE was essentially normal other than resolving diaphoresis and her anxiety. This was alleviated  After a full lunch tray and rising glucose. She proceeded to wound clinic at which time her glucose was 230.  All symptoms as above were gone  I

## 2020-01-24 NOTE — SIGNIFICANT EVENT
Around 1310 Pt displaying s/s of anxiety, ie; fidgiting/ kicking off cover. Dinorah Greenberg N instructed pt to start drinking glucerna shake, pt complied. Dinorah Greenberg also increased the air flow for pt comfort. ScoreStreak  was utilized via iPad ([id number]) , pt c/o numbness on lips and  wanted out of the chamber. Practitioner at chamber side and ordered return to surface level at 1.5  Psi/min. Pt tolerated surface return w/o issue, Pt was found to have a bg of 74 and sweating. Pt was given another glucerna and a full lunch tray. It was noted by pt's  the pt had only eaten a hamberger and coffee all day today. Pt educated to eat more food before HBO treatments and verbalized agreement. BG up to 85 and pt verbalizing feeling \"normal\" ,\"OK\". Pt got dressed and is awaiting her wound care visit. Provider reccommended BG prior to D/C from wound center.

## 2020-01-24 NOTE — PROGRESS NOTES
Procedure Laterality Date    CARDIAC SURGERY       SECTION      COLONOSCOPY N/A 2019    COLONOSCOPY DIAGNOSTIC performed by Rebecca Abraham MD at [address] [place]  2019    unknown vessels    HYSTERECTOMY      TOE AMPUTATION Right     3rd toe       FAMILY HISTORY    History reviewed. No pertinent family history. SOCIAL HISTORY    Social History     Tobacco Use    Smoking status: Never Smoker    Smokeless tobacco: Never Used   Substance Use Topics    Alcohol use: Never     Frequency: Never    Drug use: Never       ALLERGIES    Allergies   Allergen Reactions    Ambien [Zolpidem Tartrate]     Capoten [Captopril]     Clioquinol     Cogentin [Benztropine]     Depakote [Divalproex Sodium]     Effexor Xr [Venlafaxine Hcl Er]     Geodon [Ziprasidone Hcl]     Lyrica [Pregabalin]     Navane [Thiothixene]     Pamelor [Nortriptyline Hcl]     Remeron [Mirtazapine]     Risperdal [Risperidone]     Trazodone And Nefazodone     Wellbutrin [Bupropion]        MEDICATIONS    Current Outpatient Medications on File Prior to Encounter   Medication Sig Dispense Refill    insulin lispro (HUMALOG) 100 UNIT/ML injection vial Inject 20 Units into the skin 3 times daily (with meals) 1 vial 3    insulin glargine (LANTUS) 100 UNIT/ML injection vial Inject 10 Units into the skin nightly 1 vial 3    gabapentin (NEURONTIN) 300 MG capsule Take 300 mg by mouth 3 times daily. Indications: taking 400 mg       atorvastatin (LIPITOR) 40 MG tablet Take 40 mg by mouth daily      collagenase (SANTYL) 250 UNIT/GM ointment Apply topically daily.  30 g 2    ARIPiprazole (ABILIFY) 10 MG tablet Take 20 mg by mouth daily       folic acid (FOLVITE) 1 MG tablet Take 1 mg by mouth daily      melatonin 3 MG TABS tablet Take 3 mg by mouth daily Indications: 2 pills      Iron Polysacch Zakkv-E78-PS (NIFEREX-150 FORTE PO) Take by mouth      clopidogrel (PLAVIX) 75 MG tablet Take 75 (94.8 kg)       PHYSICAL EXAM    Constitutional:   Well nourished and well developed. Appears neat and clean. Patient is alert, oriented x3, and in no apparent distress. Respiratory:  Respiratory effort is easy and symmetric bilaterally. Rate is normal at rest and on room air. Vascular:  Pedal Pulses is not palpable and audible with doppler. Extremities positivefor 1+ pitting edema. Neurological:   Sensation is decreased to lower extremities. Dermatological:  Wound description noted in wound assessment. The wound(s) are diabetic foot ulcer with gangrene requiring 3rd toe amputation now with exposed bone and tendon. Ulcer probes to bone. Psychiatric:  Judgement and insight intact. Short and long term memory intact. No evidence of depression, anxiety, or agitation. Patient is calm, cooperative, and communicative. Appropriate interactions and affect.         Assessment:   Right foot non-healing ulcer     Wound Care Documentation:  Wound 04/23/19 Toe (Comment  which one) Right #1. 3rd toe (Active)   Wound Image   1/24/2020  2:32 PM   Wound Non-Healing Surgical 1/24/2020  2:32 PM   Offloading for Diabetic Foot Ulcers Post op shoe 1/3/2020  2:54 PM   Dressing Status Changed 9/17/2019 11:38 AM   Dressing Changed Changed/New 9/17/2019 11:38 AM   Wound Cleansed Rinsed/Irrigated with saline 1/24/2020  2:32 PM   Wound Length (cm) 1 cm 1/24/2020  2:32 PM   Wound Width (cm) 1.2 cm 1/24/2020  2:32 PM   Wound Depth (cm) 0.4 cm 1/24/2020  2:32 PM   Wound Surface Area (cm^2) 1.2 cm^2 1/24/2020  2:32 PM   Change in Wound Size % (l*w) -50 1/24/2020  2:32 PM   Wound Volume (cm^3) 0.48 cm^3 1/24/2020  2:32 PM   Wound Healing % -200 1/24/2020  2:32 PM   Post-Procedure Length (cm) 1 cm 1/3/2020  2:32 PM   Post-Procedure Width (cm) 1 cm 1/3/2020  2:32 PM   Post-Procedure Depth (cm) 0.3 cm 1/3/2020  2:32 PM   Post-Procedure Surface Area (cm^2) 1 cm^2 1/3/2020  2:32 PM   Post-Procedure Volume (cm^3) 0.3 cm^3 1/3/2020

## 2020-01-24 NOTE — CODE DOCUMENTATION
[address] Physician Billing Sheet. Milton Condon  AGE: 60 y.o.    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

## 2020-01-27 ENCOUNTER — HOSPITAL ENCOUNTER (OUTPATIENT)
Dept: HYPERBARIC MEDICINE | Age: 65
Discharge: HOME | End: 2020-01-27
Payer: COMMERCIAL

## 2020-01-27 VITALS
DIASTOLIC BLOOD PRESSURE: 68 MMHG | HEART RATE: 60 BPM | TEMPERATURE: 97.8 F | SYSTOLIC BLOOD PRESSURE: 110 MMHG | RESPIRATION RATE: 18 BRPM

## 2020-01-27 LAB
GLUCOSE BLD-MCNC: 228 MG/DL (ref 60–115)
GLUCOSE BLD-MCNC: 286 MG/DL (ref 60–115)
PERFORMED ON: ABNORMAL
PERFORMED ON: ABNORMAL

## 2020-01-27 PROCEDURE — G0277 HBOT, FULL BODY CHAMBER, 30M: HCPCS

## 2020-01-27 ASSESSMENT — PAIN SCALES - GENERAL: PAINLEVEL_OUTOF10: 2

## 2020-01-27 ASSESSMENT — PAIN DESCRIPTION - LOCATION: LOCATION: TOE (COMMENT WHICH ONE)

## 2020-01-27 ASSESSMENT — PAIN DESCRIPTION - ORIENTATION: ORIENTATION: RIGHT

## 2020-01-27 ASSESSMENT — PAIN DESCRIPTION - PAIN TYPE: TYPE: ACUTE PAIN

## 2020-01-27 ASSESSMENT — PAIN DESCRIPTION - DESCRIPTORS: DESCRIPTORS: ACHING

## 2020-01-27 ASSESSMENT — PAIN DESCRIPTION - FREQUENCY: FREQUENCY: CONTINUOUS

## 2020-01-27 NOTE — DISCHARGE INSTR - COC
Continuity of Care Form    Patient Name: Jasiel Oneill   :  1955  MRN:  [id number]    Admit date:  2020  Discharge date:  ***    Code Status Order: Prior   Advance Directives:     Admitting Physician:  No admitting provider for patient encounter. PCP: Nan Longo    Discharging Nurse: [facility] Unit/Room#: No information available for this encounter. Discharging Unit Phone Number: ***    Emergency Contact:   Extended Emergency Contact Information  Primary Emergency Contact: [address] [place] [address] Phone: [phone]  Relation: Spouse    Past Surgical History:  Past Surgical History:   Procedure Laterality Date    CARDIAC SURGERY       SECTION      COLONOSCOPY N/A 2019    COLONOSCOPY DIAGNOSTIC performed by Thiago Barger MD at [address] [place] [address] [place]  2019    unknown vessels    HYSTERECTOMY      TOE AMPUTATION Right     3rd toe       Immunization History: There is no immunization history on file for this patient.     Active Problems:  Patient Active Problem List   Diagnosis Code    Severe major depression with psychotic features ([address] F32.3    Type 2 diabetes mellitus with hyperglycemia, with long-term current use of insulin ([facility]) E11.65, Z79.4    HTN (hypertension) I10    HLD (hyperlipidemia) E78.5    Hypothyroid E03.9    HF (heart failure) ([facility]) I50.9    Non-pressure ulcer of toe ([address] L97.509    Atherosclerotic PVD with intermittent claudication ([address] I70.219    Acute osteomyelitis ([address] M86.10    Skin infection L08.9    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 A49.8    Surgical wound dehiscence, initial encounter T81.31XA    S/P amputation of lesser toe, right ([address] Z89.421    Rectal bleeding K62.5    Diabetic ulcer of right foot with bone involvement without evidence of necrosis ([address] E11.621, L97.516    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 ([address] 2:32 PM   Post-Procedure Volume (cm^3) 0.3 cm^3 1/3/2020  2:32 PM   Drainage Amount Moderate 2020  2:32 PM   Drainage Description Yellow;Serosanguinous 2020  2:32 PM   Odor None 2020  2:32 PM   Margins Defined edges 2020  2:32 PM   Florencia-wound Assessment Intact 2020  2:32 PM   Non-staged Wound Description Full thickness 2020  2:32 PM   Red%Wound Bed 50 2020  2:32 PM   Yellow%Wound Bed 30 1/10/2020  3:14 PM   Other%Wound Bed 50 white 2020  2:32 PM   Number of days: 279        Elimination:  Continence:   · Bowel: {YES / JL:49501}  · Bladder: {YES / AI:37263}  Urinary Catheter: {Urinary Catheter:711238000}   Colostomy/Ileostomy/Ileal Conduit: {YES / QF:05111}       Date of Last BM: ***  No intake or output data in the 24 hours ending 20 1533  No intake/output data recorded.     Safety Concerns:     [address] [employer] Safety Concerns:796309085}    Impairments/Disabilities:      [employer] Impairments/Disabilities:990506392}    Nutrition Therapy:  Current Nutrition Therapy:   [employer] Diet List:488428703}    Routes of Feeding: {CHP DME Other Feedings:960684649}  Liquids: {Slp liquid thickness:22674}  Daily Fluid Restriction: {CHP DME Yes amt example:324493034}  Last Modified Barium Swallow with Video (Video Swallowing Test): {Done Not Done GBQI:051676103}    Treatments at the Time of Hospital Discharge:   Respiratory Treatments: ***  Oxygen Therapy:  {Therapy; copd oxygen:37623}  Ventilator:    { CC Vent DEQC:093931669}    Rehab Therapies: {THERAPEUTIC INTERVENTION:2854316124}  Weight Bearing Status/Restrictions: [address] [employer] Weight Bearin}  Other Medical Equipment (for information only, NOT a DME order):  {EQUIPMENT:983664402}  Other Treatments: ***    Patient's personal belongings (please select all that are sent with patient):  {P DME Belongings:933784547}    RN SIGNATURE:  {Esignature:447173884}    CASE MANAGEMENT/SOCIAL WORK SECTION    Inpatient Status Date: ***    Readmission Risk Assessment Score:  Readmission Risk              Risk of Unplanned Readmission:        0           Discharging to Facility/ Agency   · Name:   · Address:  · Phone:  · Fax:    Dialysis Facility (if applicable)   · Name:  · Address:  · Dialysis Schedule:  · Phone:  · Fax:    / signature: {Esignature:422693002}    PHYSICIAN SECTION    Prognosis: {Prognosis:6341152409}    Condition at Discharge: [facility] Patient Condition:754610105}    Rehab Potential (if transferring to Rehab): {Prognosis:2446774857}    Recommended Labs or Other Treatments After Discharge: ***    Physician Certification: I certify the above information and transfer of Milton Condon  is necessary for the continuing treatment of the diagnosis listed and that she requires {Admit to Appropriate Level of Care:94856} for {GREATER/LESS:515411003} 30 days.      Update Admission H&P: {CHP DME Changes in KGSXA:419618367}    PHYSICIAN SIGNATURE:  {Esignature:745038512}

## 2020-01-29 ENCOUNTER — HOSPITAL ENCOUNTER (OUTPATIENT)
Dept: HYPERBARIC MEDICINE | Age: 65
Discharge: HOME | End: 2020-01-29
Payer: COMMERCIAL

## 2020-01-29 VITALS
TEMPERATURE: 96.1 F | RESPIRATION RATE: 18 BRPM | SYSTOLIC BLOOD PRESSURE: 116 MMHG | DIASTOLIC BLOOD PRESSURE: 68 MMHG | HEART RATE: 66 BPM

## 2020-01-29 LAB
GLUCOSE BLD-MCNC: 227 MG/DL (ref 60–115)
GLUCOSE BLD-MCNC: 279 MG/DL (ref 60–115)
PERFORMED ON: ABNORMAL
PERFORMED ON: ABNORMAL

## 2020-01-29 PROCEDURE — 99183 HYPERBARIC OXYGEN THERAPY: CPT | Performed by: NURSE PRACTITIONER

## 2020-01-29 PROCEDURE — G0277 HBOT, FULL BODY CHAMBER, 30M: HCPCS

## 2020-01-29 NOTE — PROGRESS NOTES
Hyperbaric Oxygen Therapy   Progress Note    NAME: Garth Mota RECORD NUMBER:  [id number]  AGE: 60 y.o. GENDER: female  : 1955  EPISODE DATE:  2020   Subjective   HBO Treatment Number: 45 out of Total Treatments: 40  HBO Diagnosis:   Celso Braggkins: Hagen 3(right foot third toe )  Indications: Lower Extremity Diabetic Wound ___(site)  Safety checks performed prior to treatment. See doc flowsheets for documentation. Objective        Recent Labs     20  1028 20  1331   POCGLU 279* 227*     Pre treatment Vital Signs       Temp: 96.5 °F (35.8 °C)     Pulse: 64     Resp: 18     BP: (!) 112/58     Post treatment Vital Signs  Temp: 96.1 °F (35.6 °C)  Pulse: 66  Resp: 18  BP: 116/68  Assessment      Physical Exam:  General Appearance: alert and oriented to person, place and time, well-developed and well-nourished, in no acute distress  ENT:  tympanic membranes intact bilaterally, normal color with both pre and post dive TEED scores normal  Pulmonary/Chest:  clear to auscultation bilaterally- no wheezes, rales or rhonchi, normal air movement, no respiratory distress  Cardiovascular:  regular rate and rhythm, no murmurs rubs or gallops  Chamber #: [id number]  Treatment Start Time: 1130     Pressure Reached Time: 1145  DOROTHY : 2  Number of Air Breaks:  Treatment Status: No Air break     Decompression Time: 1310   Treatment End Time: 1325  Length of Treatment: 90 Minutes  Symptoms Noted During Treatment: None    Adverse Event: no    I was present on these premises and immediately available to furnish assistance & direction throughout the procedure. Brenda Calderon is a 60 y.o. female  did successfully complete today's hyperbaric oxygen treatment at [address] [facility]. In my clinical judgement, ongoing HBO therapy is  necessary at this time, given a threat to patient function, limb or life from the current condition.       Supervision and attendance of Hyperbaric Oxygen Therapy provided. Continue HBO treatment as outlined in the treatment plan. Hyperbaric Oxygen: Lidia Lennox tolerated Treatment Number: 45 well today without complications. Discharge Instructions were explained and given to Ms.  Carter Valerie     Electronically signed by ESDRAS Fatima NP on 1/29/2020 at 3:21 PM

## 2020-01-30 ENCOUNTER — OFFICE VISIT (OUTPATIENT)
Dept: ENDOCRINOLOGY | Age: 65
End: 2020-01-30
Payer: COMMERCIAL

## 2020-01-30 ENCOUNTER — HOSPITAL ENCOUNTER (OUTPATIENT)
Dept: HYPERBARIC MEDICINE | Age: 65
Discharge: HOME | End: 2020-01-30
Payer: COMMERCIAL

## 2020-01-30 VITALS
OXYGEN SATURATION: 98 % | HEART RATE: 67 BPM | RESPIRATION RATE: 16 BRPM | WEIGHT: 233 LBS | DIASTOLIC BLOOD PRESSURE: 49 MMHG | BODY MASS INDEX: 39.99 KG/M2 | SYSTOLIC BLOOD PRESSURE: 120 MMHG

## 2020-01-30 VITALS
RESPIRATION RATE: 18 BRPM | TEMPERATURE: 96.7 F | DIASTOLIC BLOOD PRESSURE: 60 MMHG | HEART RATE: 80 BPM | SYSTOLIC BLOOD PRESSURE: 104 MMHG

## 2020-01-30 LAB
CHP ED QC CHECK: NORMAL
GLUCOSE BLD-MCNC: 193 MG/DL
GLUCOSE BLD-MCNC: 234 MG/DL (ref 60–115)
GLUCOSE BLD-MCNC: 376 MG/DL (ref 60–115)
PERFORMED ON: ABNORMAL
PERFORMED ON: ABNORMAL

## 2020-01-30 PROCEDURE — 1036F TOBACCO NON-USER: CPT | Performed by: INTERNAL MEDICINE

## 2020-01-30 PROCEDURE — 2022F DILAT RTA XM EVC RTNOPTHY: CPT | Performed by: INTERNAL MEDICINE

## 2020-01-30 PROCEDURE — 99213 OFFICE O/P EST LOW 20 MIN: CPT | Performed by: INTERNAL MEDICINE

## 2020-01-30 PROCEDURE — 3017F COLORECTAL CA SCREEN DOC REV: CPT | Performed by: INTERNAL MEDICINE

## 2020-01-30 PROCEDURE — G8484 FLU IMMUNIZE NO ADMIN: HCPCS | Performed by: INTERNAL MEDICINE

## 2020-01-30 PROCEDURE — G0277 HBOT, FULL BODY CHAMBER, 30M: HCPCS

## 2020-01-30 PROCEDURE — 82962 GLUCOSE BLOOD TEST: CPT | Performed by: INTERNAL MEDICINE

## 2020-01-30 PROCEDURE — G8427 DOCREV CUR MEDS BY ELIG CLIN: HCPCS | Performed by: INTERNAL MEDICINE

## 2020-01-30 PROCEDURE — G8417 CALC BMI ABV UP PARAM F/U: HCPCS | Performed by: INTERNAL MEDICINE

## 2020-01-30 RX ORDER — LANCETS 28 GAUGE
1 EACH MISCELLANEOUS DAILY
Qty: 100 EACH | Refills: 3 | Status: SHIPPED | OUTPATIENT
Start: 2020-01-30 | End: 2021-01-01 | Stop reason: SDUPTHER

## 2020-01-30 RX ORDER — INSULIN LISPRO 100 [IU]/ML
INJECTION, SOLUTION INTRAVENOUS; SUBCUTANEOUS
Qty: 15 PEN | Refills: 3 | Status: ON HOLD | OUTPATIENT
Start: 2020-01-30 | End: 2020-02-12 | Stop reason: HOSPADM

## 2020-01-30 RX ORDER — UBIQUINOL 100 MG
CAPSULE ORAL
Qty: 300 EACH | Refills: 6 | Status: SHIPPED | OUTPATIENT
Start: 2020-01-30 | End: 2020-09-21 | Stop reason: SDUPTHER

## 2020-01-30 NOTE — H&P
[facility])   Hyperbaric Oxygen Therapy Re-certification  History and Physical    NAME: Garth Mota RECORD NUMBER:  [id number]  AGE: 60 y.o. GENDER: female  : 1955  EPISODE DATE:  2020    Subjective:     Alyssa Campoverde is a 60 y.o. female who has a chief complaint of a diabetic wound/ulcer located on the RIGHT LOWER EXTREMITY: foot without radiation. Dr. Tu Flor requested a consultation regarding the possible utility of hyperbaric oxygen therapy for her diagnosis of Apoorva  3 diabetic ulcer of right foot with bone involvement without evidence of necrosis. Sirisha Muse HISTORY of PRESENT ILLNESS HPI  History of Wound/Ulcer Context: 60year old female with history of non-healing right foot diabetic ulcer at site of prior amputation of right third toe. Patient did undergo angio with endovascular revascularization on 19 . Contributing Factors: diabetes and arterial insufficiency  Wound/Ulcer Pain Timing/Severity: constant  Quality of pain: throbbing  Severity:  5 / 10   Modifying Factors: None  Associated Signs/Symptoms: none      Additional wound(s)/ulcer(s) noted? No     Ms. Ty Stephens has a past medical history of Asthma, CAD (coronary artery disease), Colitis, Diabetes mellitus ([address] Hyperlipidemia, Hypertension, Prolonged emergence from general anesthesia, PVD (peripheral vascular disease) ([facility] and Thyroid disease. She has a past surgical history that includes  section; Hysterectomy; Cardiac surgery; Coronary artery bypass graft (2019); Toe amputation (Right); and Colonoscopy (N/A, 2019). Her family history is not on file. Ms. Ty Stephens reports that she has never smoked. She has never used smokeless tobacco. She reports that she does not drink alcohol or use drugs.     Her current medication list consists of     Current Outpatient Medications on File Prior to Encounter   Medication Sig Dispense Refill    insulin lispro (HUMALOG) 100 UNIT/ML injection vial Inject 20 Units into the skin 3 times daily (with meals) 1 vial 3    insulin glargine (LANTUS) 100 UNIT/ML injection vial Inject 10 Units into the skin nightly 1 vial 3    gabapentin (NEURONTIN) 300 MG capsule Take 300 mg by mouth 3 times daily. Indications: taking 400 mg       atorvastatin (LIPITOR) 40 MG tablet Take 40 mg by mouth daily      silver sulfADIAZINE (SILVADENE) 1 % cream Apply 1 % topically 2 times daily Indications: to bilateral feet Apply topically daily.  SODIUM CHLORIDE, EXTERNAL, 0.9 % SOLN Apply 1 Applicatorful topically daily 1000 mL 2    collagenase (SANTYL) 250 UNIT/GM ointment Apply topically daily.  30 g 2    ARIPiprazole (ABILIFY) 10 MG tablet Take 20 mg by mouth daily       aspirin 81 MG chewable tablet Take 81 mg by mouth daily      folic acid (FOLVITE) 1 MG tablet Take 1 mg by mouth daily      melatonin 3 MG TABS tablet Take 3 mg by mouth daily Indications: 2 pills      Iron Polysacch [id number] (NIFEREX-150 FORTE PO) Take by mouth      clopidogrel (PLAVIX) 75 MG tablet Take 75 mg by mouth daily      Cyanocobalamin (VITAMIN B-12) 1000 MCG extended release tablet Take 1,000 mcg by mouth daily      Cholecalciferol (VITAMIN D3) 71239 units CAPS Take by mouth Indications: weekly on Sun      Cholecalciferol (VITAMIN D) 2000 units CAPS capsule Take by mouth      lisinopril (PRINIVIL;ZESTRIL) 10 MG tablet Take 5 mg by mouth daily       SODIUM CHLORIDE, EXTERNAL, 0.9 % SOLN Apply 1 Applicatorful topically daily 1000 mL 2    sodium polystyrene (SPS) 15 GM/60ML suspension Take 60 mLs by mouth 2 times daily for 2 doses 120 mL 0    sertraline (ZOLOFT) 50 MG tablet Take 1 tablet by mouth daily 30 tablet 0    insulin lispro (HUMALOG) 100 UNIT/ML injection vial Inject 0-6 Units into the skin 3 times daily (with meals) 1 vial 3    metoprolol succinate (TOPROL XL) 25 MG extended release tablet Take 1 tablet by mouth daily 30 tablet 3    metoprolol succinate (TOPROL XL) 50 MG extended release tablet Take 1 tablet by mouth daily 30 tablet 3    levothyroxine (SYNTHROID) 50 MCG tablet Take 50 mcg by mouth Daily      furosemide (LASIX) 40 MG tablet Take 20 mg by mouth three times a week Indications: M-W-F       meclizine (ANTIVERT) 25 MG tablet Take 25 mg by mouth three times daily       No current facility-administered medications on file prior to encounter. Allergies: Ambien [zolpidem tartrate]; Capoten [captopril]; Clioquinol; Cogentin [benztropine]; Depakote [divalproex sodium]; Effexor xr [venlafaxine hcl er]; Geodon [ziprasidone hcl]; Lyrica [pregabalin]; Navane [thiothixene]; Pamelor [nortriptyline hcl]; Remeron [mirtazapine]; Risperdal [risperidone]; Trazodone and nefazodone; and Wellbutrin [bupropion]    Pertinent items from the review of systems are discussed in the HPI; the remainder of the ROS was reviewed and is negative.      Objective:     Temp: 96.8  HR 78 reg respirations 20  BP: 118/70     General Appearance: alert and oriented to person, place and time, well developed and well- nourished, in no acute distress  Skin: warm and dry, no rash or erythema  Head: normocephalic and atraumatic  Eyes: pupils equal, round, and reactive to light, extraocular eye movements intact, conjunctivae normal  ENT: tympanic membrane, external ear and ear canal normal bilaterally, nose without deformity, nasal mucosa and turbinates normal without polyps  Neck: supple and non-tender without mass, no thyromegaly or thyroid nodules, no cervical lymphadenopathy  Pulmonary/Chest: clear to auscultation bilaterally- no wheezes, rales or rhonchi, normal air movement, no respiratory distress  Cardiovascular: normal rate, regular rhythm, normal S1 and S2, no murmurs, rubs, clicks, or gallops, distal pulses intact, no carotid bruits  Abdomen: soft, non-tender, non-distended, normal bowel sounds, no masses or organomegaly  Extremities: no cyanosis, clubbing or edema Weak palp femorals bilaterally. Distals both feet doppel well with biphasic signals on left  Musculoskeletal: normal range of motion, no joint swelling, deformity or tenderness  Neurologic: reflexes normal and symmetric, no cranial nerve deficit, gait, coordination and speech normal.   Wound(s): clean. Wound/Ulcer measurements are in the wound/ulcer documentation flowsheet. Media Information      Document Information     Wound   #1 right foot third toe   01/30/2020 11:22   Attached To:    Hospital Encounter on 1/30/20 with ESDRAS Sexton NP   Source Information     Carlos A Gee LPN  [facility]   Wound Care Documentation:  Wound 04/23/19 Toe (Comment  which one) Right #1. 3rd toe (Active)   Wound Image   1/24/2020  2:32 PM   Wound Non-Healing Surgical 1/24/2020  2:32 PM   Offloading for Diabetic Foot Ulcers Post op shoe 1/3/2020  2:54 PM   Wound Cleansed Rinsed/Irrigated with saline 1/24/2020  2:32 PM   Wound Length (cm) 0.9 cm 1/30/2020  1:32 PM   Wound Width (cm) 0.8 cm 1/30/2020  1:32 PM   Wound Depth (cm) 0.6 cm 1/30/2020  1:32 PM   Wound Surface Area (cm^2) 0.72 cm^2 1/30/2020  1:32 PM   Change in Wound Size % (l*w) 10 1/30/2020  1:32 PM   Wound Volume (cm^3) 0.43 cm^3 1/30/2020  1:32 PM   Wound Healing % -169 1/30/2020  1:32 PM   Post-Procedure Length (cm) 1 cm 1/3/2020  2:32 PM   Post-Procedure Width (cm) 1 cm 1/3/2020  2:32 PM   Post-Procedure Depth (cm) 0.3 cm 1/3/2020  2:32 PM   Post-Procedure Surface Area (cm^2) 1 cm^2 1/3/2020  2:32 PM   Post-Procedure Volume (cm^3) 0.3 cm^3 1/3/2020  2:32 PM   Drainage Amount Small 1/30/2020  1:32 PM   Drainage Description Clear 1/30/2020  1:32 PM   Odor None 1/30/2020  1:32 PM   Margins Defined edges 1/24/2020  2:32 PM   Florencia-wound Assessment Intact 1/24/2020  2:32 PM   Non-staged Wound Description Full thickness 1/24/2020  2:32 PM   Red%Wound Bed 50 1/24/2020  2:32 PM   Yellow%Wound Bed 30 1/10/2020  3:14 PM   Other%Wound Bed 50 white 1/24/2020  2:32 PM   Number of days: 281         CXR: Yes  10/09/19  Cardiac enlargement;  No pleural effusion or pneumothorax  EKG: Yes  NSR  Please see 10/17/19  LABS    CMP:    Lab Results   Component Value Date     01/06/2020    K 4.5 01/06/2020    K 4.2 05/30/2019    CL 99 01/06/2020    CO2 26 11/21/2019    BUN 19 11/21/2019    CREATININE 1.26 01/06/2020    GFRAA 52 01/06/2020    LABGLOM 43 01/06/2020    GLUCOSE 279 01/06/2020    PROT 6.3 05/30/2019    LABALBU <7.0 01/06/2020    CALCIUM 9.4 01/06/2020    BILITOT 0.3 05/30/2019    ALKPHOS 71 05/30/2019    AST 12 05/30/2019    ALT 9 05/30/2019     Albumin:    Lab Results   Component Value Date    LABALBU <7.0 01/06/2020     PT/INR:    Lab Results   Component Value Date    PROTIME 13.8 11/20/2019    INR 1.0 11/20/2019     HgBA1c:    Lab Results   Component Value Date    LABA1C 7.1 01/06/2020     Other diagnostics:     Assessment:     Patient Active Problem List   Diagnosis Code    Severe major depression with psychotic features ([address] F32.3    Type 2 diabetes mellitus with hyperglycemia, with long-term current use of insulin ([facility]) E11.65, Z79.4    HTN (hypertension) I10    HLD (hyperlipidemia) E78.5    Hypothyroid E03.9    HF (heart failure) ([address] I50.9    Non-pressure ulcer of toe ([address] L97.509    Atherosclerotic PVD with intermittent claudication ([address] I70.219    Acute osteomyelitis ([address] M86.10    Skin infection L08.9    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 A49.8    Surgical wound dehiscence, initial encounter T81.31XA    S/P amputation of lesser toe, right ([facility]) Z89.421    Rectal bleeding K62.5    Diabetic ulcer of right foot with bone involvement without evidence of necrosis ([address] E11.621, L97.516    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 ([facility]) I70.235    JESICA (obstructive sleep apnea) G47.33       We also discussed the inherent risks of HBOT, including confinement anxiety, otic-dental-sinus barotrauma, hypoglycemia in diabetes, seizure,

## 2020-01-30 NOTE — PROGRESS NOTES
Subjective:      Patient ID: Tye Giles is a 60 y.o. female. Follow-up on type 2 diabetes lab review patient also is going to wound care and hyperbaric for her right foot wound blood sugars were reviewed requesting refills on her meds currently on Basaglar through pen and Admelog through vials  Diabetes   She presents for her follow-up diabetic visit. She has type 2 diabetes mellitus. Symptoms are stable. Diabetic complications include nephropathy. Current diabetic treatment includes insulin injections. She is currently taking insulin pre-breakfast, pre-lunch, pre-dinner and at bedtime. Her overall blood glucose range is 140-180 mg/dl.  (Lab Results       Component                Value               Date                       LABA1C                   7.1                 01/06/2020            Testing 4-5 times a day  )       Lab Results   Component Value Date    LABA1C 7.1 01/06/2020     Lab Results   Component Value Date     01/06/2020     Lab Results   Component Value Date     01/06/2020    K 4.5 01/06/2020    CL 99 01/06/2020    CO2 26 11/21/2019    BUN 19 11/21/2019    CREATININE 1.26 (H) 01/06/2020    GLUCOSE 193 01/30/2020    CALCIUM 9.4 01/06/2020    PROT 6.3 05/30/2019    LABALBU <7.0 01/06/2020    BILITOT 0.3 05/30/2019    ALKPHOS 71 05/30/2019    AST 12 05/30/2019    ALT 9 05/30/2019    LABGLOM 43 (A) 01/06/2020    GFRAA 52 (A) 01/06/2020    GLOB 3.2 05/30/2019         Patient Active Problem List   Diagnosis    Severe major depression with psychotic features ([address]    Type 2 diabetes mellitus with hyperglycemia, with long-term current use of insulin ([address]    HTN (hypertension)    HLD (hyperlipidemia)    Hypothyroid    HF (heart failure) (H[facility])    Non-pressure ulcer of toe (HCC)    Atherosclerotic PVD with intermittent claudication (H[facility])    Acute osteomyelitis ([address]    Skin infection    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    Surgical wound dehiscence, initial encounter

## 2020-01-31 ENCOUNTER — HOSPITAL ENCOUNTER (OUTPATIENT)
Dept: HYPERBARIC MEDICINE | Age: 65
Discharge: HOME | End: 2020-01-31
Payer: COMMERCIAL

## 2020-01-31 VITALS
DIASTOLIC BLOOD PRESSURE: 60 MMHG | HEART RATE: 60 BPM | RESPIRATION RATE: 10 BRPM | SYSTOLIC BLOOD PRESSURE: 100 MMHG | TEMPERATURE: 97.6 F

## 2020-01-31 LAB
GLUCOSE BLD-MCNC: 295 MG/DL (ref 60–115)
GLUCOSE BLD-MCNC: 375 MG/DL (ref 60–115)
PERFORMED ON: ABNORMAL
PERFORMED ON: ABNORMAL

## 2020-01-31 PROCEDURE — 99183 HYPERBARIC OXYGEN THERAPY: CPT | Performed by: NURSE PRACTITIONER

## 2020-01-31 PROCEDURE — G0277 HBOT, FULL BODY CHAMBER, 30M: HCPCS

## 2020-01-31 ASSESSMENT — PAIN SCALES - GENERAL: PAINLEVEL_OUTOF10: 0

## 2020-02-03 ENCOUNTER — HOSPITAL ENCOUNTER (OUTPATIENT)
Dept: HYPERBARIC MEDICINE | Age: 65
Discharge: HOME | End: 2020-02-03
Payer: COMMERCIAL

## 2020-02-03 VITALS
HEART RATE: 60 BPM | SYSTOLIC BLOOD PRESSURE: 110 MMHG | DIASTOLIC BLOOD PRESSURE: 68 MMHG | TEMPERATURE: 95.7 F | RESPIRATION RATE: 18 BRPM

## 2020-02-03 LAB
GLUCOSE BLD-MCNC: 192 MG/DL (ref 60–115)
GLUCOSE BLD-MCNC: 203 MG/DL (ref 60–115)
PERFORMED ON: ABNORMAL
PERFORMED ON: ABNORMAL

## 2020-02-03 PROCEDURE — G0277 HBOT, FULL BODY CHAMBER, 30M: HCPCS

## 2020-02-04 ENCOUNTER — HOSPITAL ENCOUNTER (OUTPATIENT)
Dept: HYPERBARIC MEDICINE | Age: 65
Discharge: HOME | End: 2020-02-04
Payer: COMMERCIAL

## 2020-02-04 VITALS
DIASTOLIC BLOOD PRESSURE: 70 MMHG | SYSTOLIC BLOOD PRESSURE: 110 MMHG | TEMPERATURE: 96.6 F | HEART RATE: 60 BPM | RESPIRATION RATE: 18 BRPM

## 2020-02-04 LAB
GLUCOSE BLD-MCNC: 159 MG/DL (ref 60–115)
GLUCOSE BLD-MCNC: 206 MG/DL (ref 60–115)
PERFORMED ON: ABNORMAL
PERFORMED ON: ABNORMAL

## 2020-02-04 PROCEDURE — G0277 HBOT, FULL BODY CHAMBER, 30M: HCPCS

## 2020-02-04 NOTE — PROGRESS NOTES
Hyperbaric Oxygen Therapy   Progress Note    NAME: Garth Mota RECORD NUMBER:  [id number]  AGE: 60 y.o. GENDER: female  : 1955  EPISODE DATE:  2020   Subjective   HBO Treatment Number: 42 out of Total Treatments: 60  HBO Diagnosis:   Scot DacePhyliss Blight 3  Indications: Lower Extremity Diabetic Wound ___(site)(right foot third toe )  Safety checks performed prior to treatment. See doc flowsheets for documentation. Objective        Recent Labs     20  1032 20  1240   POCGLU 206* 159*     Pre treatment Vital Signs       Temp: 97.6 °F (36.4 °C)     Pulse: 68     Resp: 18     BP: 112/60     Post treatment Vital Signs  Temp: 96.6 °F (35.9 °C)  Pulse: 60  Resp: 18  BP: 110/70  Assessment      Physical Exam:  General Appearance:  alert and oriented to person, place and time, well-developed and well-nourished, in no acute distress  ENT:  tympanic membranes intact bilaterally  Pulmonary/Chest:  clear to auscultation bilaterally- no wheezes, rales or rhonchi, normal air movement, no respiratory distress  Cardiovascular:  regular rate and rhythm  Chamber #: [id number]  Treatment Start Time: 1045     Pressure Reached Time: 1100  DOROTHY : 2  Number of Air Breaks:  Treatment Status: No Air break     Decompression Time: 1230   Treatment End Time: 1240  Length of Treatment: 90 Minutes       Adverse Event: no    I was present on these premises and immediately available to furnish assistance & direction throughout the procedure. Plan      Leopold Oppenheim is a 60 y.o. female  did successfully complete today's hyperbaric oxygen treatment at [address] [place] [address]. In my clinical judgement, ongoing HBO therapy is  necessary at this time, given a threat to patient function, limb or life from the current condition. Supervision and attendance of Hyperbaric Oxygen Therapy provided. Continue HBO treatment as outlined in the treatment plan.     Hyperbaric Oxygen: Lyn Mars tolerated Treatment Number: 57 well today without complications. Discharge Instructions were explained and given to Ms. Maria L Francis     Electronically signed by ESDRAS Nguyen NP on 2/4/2020 at 1:37 PM

## 2020-02-06 ENCOUNTER — HOSPITAL ENCOUNTER (OUTPATIENT)
Dept: HYPERBARIC MEDICINE | Age: 65
Discharge: HOME | End: 2020-02-06
Payer: COMMERCIAL

## 2020-02-06 VITALS
SYSTOLIC BLOOD PRESSURE: 118 MMHG | TEMPERATURE: 96.4 F | DIASTOLIC BLOOD PRESSURE: 70 MMHG | HEART RATE: 68 BPM | RESPIRATION RATE: 18 BRPM

## 2020-02-06 LAB
GLUCOSE BLD-MCNC: 240 MG/DL (ref 60–115)
GLUCOSE BLD-MCNC: 316 MG/DL (ref 60–115)
PERFORMED ON: ABNORMAL
PERFORMED ON: ABNORMAL

## 2020-02-06 PROCEDURE — 99183 HYPERBARIC OXYGEN THERAPY: CPT | Performed by: NURSE PRACTITIONER

## 2020-02-06 PROCEDURE — G0277 HBOT, FULL BODY CHAMBER, 30M: HCPCS

## 2020-02-06 NOTE — PROGRESS NOTES
HBO treatment as outlined in the treatment plan. Hyperbaric Oxygen: Mickey Gu tolerated Treatment Number: 37 well today without complications. Discharge Instructions were explained and given to Ms. Diana Metcalf     Electronically signed by ESDRAS Moctezuma NP on 2/6/2020 at 2:09 PM

## 2020-02-07 ENCOUNTER — HOSPITAL ENCOUNTER (OUTPATIENT)
Dept: WOUND CARE | Age: 65
Discharge: HOME | End: 2020-02-07
Payer: COMMERCIAL

## 2020-02-07 ENCOUNTER — HOSPITAL ENCOUNTER (OUTPATIENT)
Dept: HYPERBARIC MEDICINE | Age: 65
Discharge: HOME | End: 2020-02-07
Payer: COMMERCIAL

## 2020-02-07 VITALS — WEIGHT: 233 LBS | HEIGHT: 64 IN | BODY MASS INDEX: 39.78 KG/M2

## 2020-02-07 VITALS — RESPIRATION RATE: 18 BRPM | TEMPERATURE: 97.1 F | HEART RATE: 68 BPM

## 2020-02-07 PROBLEM — E13.40 NEUROPATHY DUE TO SECONDARY DIABETES (HCC): Status: ACTIVE | Noted: 2020-02-07

## 2020-02-07 LAB
GLUCOSE BLD-MCNC: 234 MG/DL (ref 60–115)
GLUCOSE BLD-MCNC: 252 MG/DL (ref 60–115)
PERFORMED ON: ABNORMAL
PERFORMED ON: ABNORMAL

## 2020-02-07 PROCEDURE — 99183 HYPERBARIC OXYGEN THERAPY: CPT | Performed by: NURSE PRACTITIONER

## 2020-02-07 PROCEDURE — 99213 OFFICE O/P EST LOW 20 MIN: CPT

## 2020-02-07 PROCEDURE — 99213 OFFICE O/P EST LOW 20 MIN: CPT | Performed by: SURGERY

## 2020-02-07 PROCEDURE — G0277 HBOT, FULL BODY CHAMBER, 30M: HCPCS

## 2020-02-07 RX ORDER — PREGABALIN 50 MG/1
50 CAPSULE ORAL 3 TIMES DAILY
Qty: 90 CAPSULE | Refills: 3 | Status: ON HOLD | OUTPATIENT
Start: 2020-02-07 | End: 2020-02-26

## 2020-02-07 RX ORDER — PREGABALIN 100 MG/1
100 CAPSULE ORAL 2 TIMES DAILY
COMMUNITY
End: 2020-02-07

## 2020-02-07 ASSESSMENT — PAIN SCALES - GENERAL: PAINLEVEL_OUTOF10: 7

## 2020-02-07 ASSESSMENT — PAIN DESCRIPTION - LOCATION: LOCATION: FOOT

## 2020-02-07 ASSESSMENT — PAIN DESCRIPTION - FREQUENCY: FREQUENCY: INTERMITTENT

## 2020-02-07 ASSESSMENT — PAIN DESCRIPTION - ORIENTATION: ORIENTATION: RIGHT

## 2020-02-07 NOTE — PROGRESS NOTES
needed. 100 each 3    Alcohol Swabs (ALCOHOL PREP) 70 % PADS qid 300 each 06    Insulin Pen Needle (NOVOFINE) 32G X 6 MM MISC Qid 300 each 3    insulin lispro (HUMALOG) 100 UNIT/ML injection vial Inject 20 Units into the skin 3 times daily (with meals) 1 vial 3    insulin glargine (LANTUS) 100 UNIT/ML injection vial Inject 10 Units into the skin nightly 1 vial 3    atorvastatin (LIPITOR) 40 MG tablet Take 40 mg by mouth daily      silver sulfADIAZINE (SILVADENE) 1 % cream Apply 1 % topically 2 times daily Indications: to bilateral feet Apply topically daily.  SODIUM CHLORIDE, EXTERNAL, 0.9 % SOLN Apply 1 Applicatorful topically daily 1000 mL 2    collagenase (SANTYL) 250 UNIT/GM ointment Apply topically daily.  30 g 2    ARIPiprazole (ABILIFY) 10 MG tablet Take 20 mg by mouth daily       aspirin 81 MG chewable tablet Take 81 mg by mouth daily      folic acid (FOLVITE) 1 MG tablet Take 1 mg by mouth daily      melatonin 3 MG TABS tablet Take 3 mg by mouth daily Indications: 2 pills      Iron Polysacch [id number] (NIFEREX-150 FORTE PO) Take by mouth      clopidogrel (PLAVIX) 75 MG tablet Take 75 mg by mouth daily      Cyanocobalamin (VITAMIN B-12) 1000 MCG extended release tablet Take 1,000 mcg by mouth daily      Cholecalciferol (VITAMIN D3) 59299 units CAPS Take by mouth Indications: weekly on Sun      Cholecalciferol (VITAMIN D) 2000 units CAPS capsule Take by mouth      lisinopril (PRINIVIL;ZESTRIL) 10 MG tablet Take 5 mg by mouth daily       SODIUM CHLORIDE, EXTERNAL, 0.9 % SOLN Apply 1 Applicatorful topically daily 1000 mL 2    sodium polystyrene (SPS) 15 GM/60ML suspension Take 60 mLs by mouth 2 times daily for 2 doses 120 mL 0    sertraline (ZOLOFT) 50 MG tablet Take 1 tablet by mouth daily 30 tablet 0    insulin lispro (HUMALOG) 100 UNIT/ML injection vial Inject 0-6 Units into the skin 3 times daily (with meals) 1 vial 3    metoprolol succinate (TOPROL XL) 25 MG extended release

## 2020-02-11 ENCOUNTER — HOSPITAL ENCOUNTER (OUTPATIENT)
Age: 65
Setting detail: OBSERVATION
Discharge: HOME | End: 2020-02-12
Attending: EMERGENCY MEDICINE | Admitting: INTERNAL MEDICINE
Payer: COMMERCIAL

## 2020-02-11 ENCOUNTER — APPOINTMENT (OUTPATIENT)
Dept: ULTRASOUND IMAGING | Age: 65
End: 2020-02-11
Payer: COMMERCIAL

## 2020-02-11 ENCOUNTER — APPOINTMENT (OUTPATIENT)
Dept: GENERAL RADIOLOGY | Age: 65
End: 2020-02-11
Payer: COMMERCIAL

## 2020-02-11 PROBLEM — R07.9 CHEST PAIN: Status: ACTIVE | Noted: 2020-02-11

## 2020-02-11 LAB
ALBUMIN SERPL-MCNC: 3.4 G/DL (ref 3.5–4.6)
ALP BLD-CCNC: 105 U/L (ref 40–130)
ALT SERPL-CCNC: 24 U/L (ref 0–33)
ANION GAP SERPL CALCULATED.3IONS-SCNC: 13 MEQ/L (ref 9–15)
ANISOCYTOSIS: ABNORMAL
AST SERPL-CCNC: 26 U/L (ref 0–35)
BASOPHILS ABSOLUTE: 0.2 K/UL (ref 0–0.2)
BASOPHILS RELATIVE PERCENT: 1.6 %
BILIRUB SERPL-MCNC: <0.2 MG/DL (ref 0.2–0.7)
BUN BLDV-MCNC: 45 MG/DL (ref 8–23)
CALCIUM SERPL-MCNC: 9.3 MG/DL (ref 8.5–9.9)
CHLORIDE BLD-SCNC: 101 MEQ/L (ref 95–107)
CO2: 24 MEQ/L (ref 20–31)
CREAT SERPL-MCNC: 1.42 MG/DL (ref 0.5–0.9)
EOSINOPHILS ABSOLUTE: 0.2 K/UL (ref 0–0.7)
EOSINOPHILS RELATIVE PERCENT: 1.8 %
GFR AFRICAN AMERICAN: 45.3
GFR NON-AFRICAN AMERICAN: 37.5
GLOBULIN: 3.7 G/DL (ref 2.3–3.5)
GLUCOSE BLD-MCNC: 207 MG/DL (ref 70–99)
GLUCOSE BLD-MCNC: 275 MG/DL (ref 60–115)
HCT VFR BLD CALC: 38.4 % (ref 37–47)
HEMOGLOBIN: 13 G/DL (ref 12–16)
LYMPHOCYTES ABSOLUTE: 2.6 K/UL (ref 1–4.8)
LYMPHOCYTES RELATIVE PERCENT: 24.8 %
MACROCYTES: ABNORMAL
MAGNESIUM: 2.4 MG/DL (ref 1.7–2.4)
MCH RBC QN AUTO: 24.6 PG (ref 27–31.3)
MCHC RBC AUTO-ENTMCNC: 33.8 % (ref 33–37)
MCV RBC AUTO: 72.8 FL (ref 82–100)
MICROCYTES: ABNORMAL
MONOCYTES ABSOLUTE: 0.9 K/UL (ref 0.2–0.8)
MONOCYTES RELATIVE PERCENT: 8.6 %
NEUTROPHILS ABSOLUTE: 6.6 K/UL (ref 1.4–6.5)
NEUTROPHILS RELATIVE PERCENT: 63.2 %
PDW BLD-RTO: 17.9 % (ref 11.5–14.5)
PERFORMED ON: ABNORMAL
PLATELET # BLD: 228 K/UL (ref 130–400)
PLATELET SLIDE REVIEW: ADEQUATE
POTASSIUM SERPL-SCNC: 5.7 MEQ/L (ref 3.4–4.9)
RBC # BLD: 5.28 M/UL (ref 4.2–5.4)
SLIDE REVIEW: ABNORMAL
SODIUM BLD-SCNC: 138 MEQ/L (ref 135–144)
TOTAL PROTEIN: 7.1 G/DL (ref 6.3–8)
TROPONIN: <0.01 NG/ML (ref 0–0.01)
WBC # BLD: 10.4 K/UL (ref 4.8–10.8)

## 2020-02-11 PROCEDURE — 36592 COLLECT BLOOD FROM PICC: CPT

## 2020-02-11 PROCEDURE — G0378 HOSPITAL OBSERVATION PER HR: HCPCS

## 2020-02-11 PROCEDURE — 6360000002 HC RX W HCPCS: Performed by: NURSE PRACTITIONER

## 2020-02-11 PROCEDURE — 6370000000 HC RX 637 (ALT 250 FOR IP): Performed by: NURSE PRACTITIONER

## 2020-02-11 PROCEDURE — 2580000003 HC RX 258: Performed by: NURSE PRACTITIONER

## 2020-02-11 PROCEDURE — 83735 ASSAY OF MAGNESIUM: CPT

## 2020-02-11 PROCEDURE — 93926 LOWER EXTREMITY STUDY: CPT

## 2020-02-11 PROCEDURE — 93005 ELECTROCARDIOGRAM TRACING: CPT | Performed by: EMERGENCY MEDICINE

## 2020-02-11 PROCEDURE — 85025 COMPLETE CBC W/AUTO DIFF WBC: CPT

## 2020-02-11 PROCEDURE — 96372 THER/PROPH/DIAG INJ SC/IM: CPT

## 2020-02-11 PROCEDURE — 36415 COLL VENOUS BLD VENIPUNCTURE: CPT

## 2020-02-11 PROCEDURE — 96374 THER/PROPH/DIAG INJ IV PUSH: CPT

## 2020-02-11 PROCEDURE — 80053 COMPREHEN METABOLIC PANEL: CPT

## 2020-02-11 PROCEDURE — 2580000003 HC RX 258: Performed by: EMERGENCY MEDICINE

## 2020-02-11 PROCEDURE — 6370000000 HC RX 637 (ALT 250 FOR IP): Performed by: EMERGENCY MEDICINE

## 2020-02-11 PROCEDURE — 96375 TX/PRO/DX INJ NEW DRUG ADDON: CPT

## 2020-02-11 PROCEDURE — 84484 ASSAY OF TROPONIN QUANT: CPT

## 2020-02-11 PROCEDURE — 99285 EMERGENCY DEPT VISIT HI MDM: CPT

## 2020-02-11 PROCEDURE — 6360000002 HC RX W HCPCS: Performed by: EMERGENCY MEDICINE

## 2020-02-11 PROCEDURE — 96376 TX/PRO/DX INJ SAME DRUG ADON: CPT

## 2020-02-11 PROCEDURE — 71046 X-RAY EXAM CHEST 2 VIEWS: CPT

## 2020-02-11 RX ORDER — SODIUM CHLORIDE 9 MG/ML
INJECTION, SOLUTION INTRAVENOUS CONTINUOUS
Status: DISPENSED | OUTPATIENT
Start: 2020-02-11 | End: 2020-02-12

## 2020-02-11 RX ORDER — ACETAMINOPHEN 325 MG/1
650 TABLET ORAL EVERY 4 HOURS PRN
Status: DISCONTINUED | OUTPATIENT
Start: 2020-02-11 | End: 2020-02-12 | Stop reason: HOSPADM

## 2020-02-11 RX ORDER — FUROSEMIDE 40 MG/1
40 TABLET ORAL DAILY
Status: DISCONTINUED | OUTPATIENT
Start: 2020-02-12 | End: 2020-02-12 | Stop reason: HOSPADM

## 2020-02-11 RX ORDER — 0.9 % SODIUM CHLORIDE 0.9 %
500 INTRAVENOUS SOLUTION INTRAVENOUS ONCE
Status: COMPLETED | OUTPATIENT
Start: 2020-02-11 | End: 2020-02-11

## 2020-02-11 RX ORDER — SERTRALINE HYDROCHLORIDE 100 MG/1
200 TABLET, FILM COATED ORAL DAILY
Status: DISCONTINUED | OUTPATIENT
Start: 2020-02-12 | End: 2020-02-12 | Stop reason: HOSPADM

## 2020-02-11 RX ORDER — KETOROLAC TROMETHAMINE 30 MG/ML
30 INJECTION, SOLUTION INTRAMUSCULAR; INTRAVENOUS ONCE
Status: COMPLETED | OUTPATIENT
Start: 2020-02-11 | End: 2020-02-11

## 2020-02-11 RX ORDER — SODIUM CHLORIDE 0.9 % (FLUSH) 0.9 %
10 SYRINGE (ML) INJECTION EVERY 12 HOURS SCHEDULED
Status: DISCONTINUED | OUTPATIENT
Start: 2020-02-11 | End: 2020-02-12 | Stop reason: HOSPADM

## 2020-02-11 RX ORDER — LEVOTHYROXINE SODIUM 0.05 MG/1
50 TABLET ORAL DAILY
Status: DISCONTINUED | OUTPATIENT
Start: 2020-02-12 | End: 2020-02-12 | Stop reason: HOSPADM

## 2020-02-11 RX ORDER — DEXTROSE MONOHYDRATE 25 G/50ML
12.5 INJECTION, SOLUTION INTRAVENOUS PRN
Status: DISCONTINUED | OUTPATIENT
Start: 2020-02-11 | End: 2020-02-12 | Stop reason: HOSPADM

## 2020-02-11 RX ORDER — FOLIC ACID 1 MG/1
1 TABLET ORAL DAILY
Status: DISCONTINUED | OUTPATIENT
Start: 2020-02-12 | End: 2020-02-12 | Stop reason: HOSPADM

## 2020-02-11 RX ORDER — ONDANSETRON 2 MG/ML
4 INJECTION INTRAMUSCULAR; INTRAVENOUS ONCE
Status: COMPLETED | OUTPATIENT
Start: 2020-02-11 | End: 2020-02-11

## 2020-02-11 RX ORDER — METOPROLOL SUCCINATE 50 MG/1
50 TABLET, EXTENDED RELEASE ORAL DAILY
Status: DISCONTINUED | OUTPATIENT
Start: 2020-02-12 | End: 2020-02-12 | Stop reason: HOSPADM

## 2020-02-11 RX ORDER — CLOPIDOGREL BISULFATE 75 MG/1
75 TABLET ORAL DAILY
Status: DISCONTINUED | OUTPATIENT
Start: 2020-02-12 | End: 2020-02-12 | Stop reason: HOSPADM

## 2020-02-11 RX ORDER — SENNA PLUS 8.6 MG/1
1 TABLET ORAL DAILY PRN
Status: DISCONTINUED | OUTPATIENT
Start: 2020-02-11 | End: 2020-02-12 | Stop reason: HOSPADM

## 2020-02-11 RX ORDER — MORPHINE SULFATE 2 MG/ML
4 INJECTION, SOLUTION INTRAMUSCULAR; INTRAVENOUS
Status: COMPLETED | OUTPATIENT
Start: 2020-02-11 | End: 2020-02-12

## 2020-02-11 RX ORDER — ATORVASTATIN CALCIUM 40 MG/1
40 TABLET, FILM COATED ORAL NIGHTLY
Status: DISCONTINUED | OUTPATIENT
Start: 2020-02-11 | End: 2020-02-12 | Stop reason: HOSPADM

## 2020-02-11 RX ORDER — HALOPERIDOL 5 MG
5 TABLET ORAL NIGHTLY
COMMUNITY

## 2020-02-11 RX ORDER — INSULIN GLARGINE 100 [IU]/ML
35 INJECTION, SOLUTION SUBCUTANEOUS NIGHTLY
Status: DISCONTINUED | OUTPATIENT
Start: 2020-02-11 | End: 2020-02-12 | Stop reason: HOSPADM

## 2020-02-11 RX ORDER — SODIUM CHLORIDE 0.9 % (FLUSH) 0.9 %
10 SYRINGE (ML) INJECTION PRN
Status: DISCONTINUED | OUTPATIENT
Start: 2020-02-11 | End: 2020-02-12 | Stop reason: HOSPADM

## 2020-02-11 RX ORDER — DEXTROSE MONOHYDRATE 50 MG/ML
100 INJECTION, SOLUTION INTRAVENOUS PRN
Status: DISCONTINUED | OUTPATIENT
Start: 2020-02-11 | End: 2020-02-12 | Stop reason: HOSPADM

## 2020-02-11 RX ORDER — VITAMIN B COMPLEX
2000 TABLET ORAL DAILY
Status: DISCONTINUED | OUTPATIENT
Start: 2020-02-12 | End: 2020-02-12 | Stop reason: HOSPADM

## 2020-02-11 RX ORDER — 0.9 % SODIUM CHLORIDE 0.9 %
1000 INTRAVENOUS SOLUTION INTRAVENOUS ONCE
Status: DISCONTINUED | OUTPATIENT
Start: 2020-02-11 | End: 2020-02-11

## 2020-02-11 RX ORDER — HYDROXYZINE PAMOATE 50 MG/1
50 CAPSULE ORAL 3 TIMES DAILY PRN
Status: ON HOLD | COMMUNITY
End: 2020-05-12 | Stop reason: HOSPADM

## 2020-02-11 RX ORDER — SODIUM POLYSTYRENE SULFONATE 15 G/60ML
15 SUSPENSION ORAL; RECTAL ONCE
Status: COMPLETED | OUTPATIENT
Start: 2020-02-11 | End: 2020-02-11

## 2020-02-11 RX ORDER — CHOLECALCIFEROL (VITAMIN D3) 125 MCG
1000 CAPSULE ORAL DAILY
Status: DISCONTINUED | OUTPATIENT
Start: 2020-02-12 | End: 2020-02-12 | Stop reason: HOSPADM

## 2020-02-11 RX ORDER — ONDANSETRON 2 MG/ML
4 INJECTION INTRAMUSCULAR; INTRAVENOUS EVERY 6 HOURS PRN
Status: DISCONTINUED | OUTPATIENT
Start: 2020-02-11 | End: 2020-02-12 | Stop reason: HOSPADM

## 2020-02-11 RX ORDER — NICOTINE POLACRILEX 4 MG
15 LOZENGE BUCCAL PRN
Status: DISCONTINUED | OUTPATIENT
Start: 2020-02-11 | End: 2020-02-12 | Stop reason: HOSPADM

## 2020-02-11 RX ORDER — PREGABALIN 50 MG/1
50 CAPSULE ORAL 3 TIMES DAILY
Status: DISCONTINUED | OUTPATIENT
Start: 2020-02-11 | End: 2020-02-12 | Stop reason: HOSPADM

## 2020-02-11 RX ORDER — LISINOPRIL 10 MG/1
10 TABLET ORAL DAILY
Status: DISCONTINUED | OUTPATIENT
Start: 2020-02-12 | End: 2020-02-12 | Stop reason: HOSPADM

## 2020-02-11 RX ORDER — ASPIRIN 81 MG/1
81 TABLET, CHEWABLE ORAL DAILY
Status: DISCONTINUED | OUTPATIENT
Start: 2020-02-12 | End: 2020-02-12 | Stop reason: HOSPADM

## 2020-02-11 RX ORDER — HEPARIN SODIUM 5000 [USP'U]/ML
5000 INJECTION, SOLUTION INTRAVENOUS; SUBCUTANEOUS EVERY 8 HOURS SCHEDULED
Status: DISCONTINUED | OUTPATIENT
Start: 2020-02-11 | End: 2020-02-11

## 2020-02-11 RX ADMIN — KETOROLAC TROMETHAMINE 30 MG: 30 INJECTION, SOLUTION INTRAMUSCULAR; INTRAVENOUS at 12:10

## 2020-02-11 RX ADMIN — HEPARIN SODIUM 5000 UNITS: 5000 INJECTION, SOLUTION INTRAVENOUS; SUBCUTANEOUS at 15:53

## 2020-02-11 RX ADMIN — SODIUM CHLORIDE 500 ML: 9 INJECTION, SOLUTION INTRAVENOUS at 13:08

## 2020-02-11 RX ADMIN — SODIUM CHLORIDE: 9 INJECTION, SOLUTION INTRAVENOUS at 15:53

## 2020-02-11 RX ADMIN — ONDANSETRON 4 MG: 2 INJECTION INTRAMUSCULAR; INTRAVENOUS at 14:26

## 2020-02-11 RX ADMIN — ONDANSETRON 4 MG: 2 INJECTION INTRAMUSCULAR; INTRAVENOUS at 12:10

## 2020-02-11 RX ADMIN — SODIUM POLYSTYRENE SULFONATE 15 G: 15 SUSPENSION ORAL; RECTAL at 15:53

## 2020-02-11 RX ADMIN — ATORVASTATIN CALCIUM 40 MG: 40 TABLET, FILM COATED ORAL at 22:18

## 2020-02-11 RX ADMIN — PREGABALIN 50 MG: 50 CAPSULE ORAL at 22:18

## 2020-02-11 RX ADMIN — HYDROMORPHONE HYDROCHLORIDE 1 MG: 1 INJECTION, SOLUTION INTRAMUSCULAR; INTRAVENOUS; SUBCUTANEOUS at 14:26

## 2020-02-11 RX ADMIN — MORPHINE SULFATE 4 MG: 2 INJECTION, SOLUTION INTRAMUSCULAR; INTRAVENOUS at 12:10

## 2020-02-11 RX ADMIN — Medication 10 ML: at 22:18

## 2020-02-11 ASSESSMENT — PAIN DESCRIPTION - ORIENTATION: ORIENTATION: LEFT

## 2020-02-11 ASSESSMENT — PAIN DESCRIPTION - LOCATION
LOCATION: CHEST
LOCATION: CHEST;FOOT

## 2020-02-11 ASSESSMENT — ENCOUNTER SYMPTOMS
EYES NEGATIVE: 1
GASTROINTESTINAL NEGATIVE: 1
RESPIRATORY NEGATIVE: 1
BACK PAIN: 0
VOMITING: 0
ABDOMINAL PAIN: 0
SORE THROAT: 0
COLOR CHANGE: 1
SHORTNESS OF BREATH: 0
NAUSEA: 0
COUGH: 0
ALLERGIC/IMMUNOLOGIC NEGATIVE: 1
DIARRHEA: 0

## 2020-02-11 ASSESSMENT — PAIN SCALES - GENERAL
PAINLEVEL_OUTOF10: 2
PAINLEVEL_OUTOF10: 7
PAINLEVEL_OUTOF10: 9
PAINLEVEL_OUTOF10: 8

## 2020-02-11 ASSESSMENT — PAIN DESCRIPTION - FREQUENCY: FREQUENCY: CONTINUOUS

## 2020-02-11 ASSESSMENT — PAIN SCALES - WONG BAKER: WONGBAKER_NUMERICALRESPONSE: 2

## 2020-02-11 ASSESSMENT — PAIN DESCRIPTION - DESCRIPTORS: DESCRIPTORS: ACHING

## 2020-02-11 ASSESSMENT — PAIN DESCRIPTION - PAIN TYPE: TYPE: CHRONIC PAIN

## 2020-02-11 NOTE — ED NOTES
While in the room medicating patient pulse ox went to 88-89% while patient was falling asleep. Pt placed on 2 liters nasal canula and oxygen up %.       David Harrison RN  02/11/20 0640

## 2020-02-11 NOTE — ED NOTES
Pt given gown to change into. Dr Arianne Burr in to see patient. Per patient's  patient has had chest pain for 4 days. Pt is alert and oriented x3. Skin is tan warm and dry. Burmese speaking only and  answer's questions for patient. Resps even and unlabored. No distress noted. When patient asked about pain she does point to the midsternal area of her chest and her legs. Some pain on palpation of chest and left leg.   Armaan Velarde RN  02/11/20 [id number] [employer]  02/11/20 [facility] Maura Saeed RN  02/11/20 [facility]  02/11/20 [id number]

## 2020-02-11 NOTE — H&P
Eyes:      Extraocular Movements: Extraocular movements intact. Conjunctiva/sclera: Conjunctivae normal.      Pupils: Pupils are equal, round, and reactive to light. Neck:      Musculoskeletal: Normal range of motion and neck supple. Cardiovascular:      Rate and Rhythm: Normal rate and regular rhythm. Pulses: Normal pulses. Heart sounds: Normal heart sounds. Pulmonary:      Effort: Pulmonary effort is normal.      Breath sounds: Normal breath sounds. Abdominal:      General: Bowel sounds are normal.      Palpations: Abdomen is soft. Musculoskeletal: Normal range of motion. General: Tenderness present. Left lower leg: Edema present. Skin:     General: Skin is warm and dry. Capillary Refill: Capillary refill takes less than 2 seconds. Neurological:      General: No focal deficit present. Mental Status: She is alert and oriented to person, place, and time. Psychiatric:         Mood and Affect: Mood normal.         Review of Systems   Constitutional: Negative. HENT: Negative. Eyes: Negative. Respiratory: Negative. Cardiovascular: Positive for chest pain and leg swelling. Gastrointestinal: Negative. Endocrine: Negative. Genitourinary: Negative. Musculoskeletal: Negative. Skin: Positive for color change and wound. Allergic/Immunologic: Negative. Neurological: Negative. Hematological: Negative. Psychiatric/Behavioral: Negative. Medications:  Reviewed     No current facility-administered medications on file prior to encounter. Current Outpatient Medications on File Prior to Encounter   Medication Sig Dispense Refill    pregabalin (LYRICA) 50 MG capsule Take 1 capsule by mouth 3 times daily for 30 days.  90 capsule 3    insulin glargine (BASAGLAR KWIKPEN) 100 UNIT/ML injection pen Inject 35 Units into the skin nightly 15 pen 1    insulin lispro, 1 Unit Dial, (ADMELOG SOLOSTAR) 100 UNIT/ML SOPN 10 units with each meals 15 pen 3    FreeStyle Lancets MISC 1 each by Does not apply route daily 100 each 3    blood glucose test strips (FREESTYLE LITE) strip 1 each by In Vitro route daily As needed. 100 each 3    Alcohol Swabs (ALCOHOL PREP) 70 % PADS qid 300 each 06    Insulin Pen Needle (NOVOFINE) 32G X 6 MM MISC Qid 300 each 3    insulin lispro (HUMALOG) 100 UNIT/ML injection vial Inject 20 Units into the skin 3 times daily (with meals) 1 vial 3    insulin glargine (LANTUS) 100 UNIT/ML injection vial Inject 10 Units into the skin nightly 1 vial 3    atorvastatin (LIPITOR) 40 MG tablet Take 40 mg by mouth daily      silver sulfADIAZINE (SILVADENE) 1 % cream Apply 1 % topically 2 times daily Indications: to bilateral feet Apply topically daily.  SODIUM CHLORIDE, EXTERNAL, 0.9 % SOLN Apply 1 Applicatorful topically daily 1000 mL 2    collagenase (SANTYL) 250 UNIT/GM ointment Apply topically daily.  30 g 2    ARIPiprazole (ABILIFY) 10 MG tablet Take 20 mg by mouth daily       aspirin 81 MG chewable tablet Take 81 mg by mouth daily      folic acid (FOLVITE) 1 MG tablet Take 1 mg by mouth daily      melatonin 3 MG TABS tablet Take 3 mg by mouth daily Indications: 2 pills      Iron Polysacch [id number] (NIFEREX-150 FORTE PO) Take by mouth      clopidogrel (PLAVIX) 75 MG tablet Take 75 mg by mouth daily      Cyanocobalamin (VITAMIN B-12) 1000 MCG extended release tablet Take 1,000 mcg by mouth daily      Cholecalciferol (VITAMIN D3) 97078 units CAPS Take by mouth Indications: weekly on Sun      Cholecalciferol (VITAMIN D) 2000 units CAPS capsule Take by mouth      lisinopril (PRINIVIL;ZESTRIL) 10 MG tablet Take 5 mg by mouth daily       SODIUM CHLORIDE, EXTERNAL, 0.9 % SOLN Apply 1 Applicatorful topically daily 1000 mL 2    sodium polystyrene (SPS) 15 GM/60ML suspension Take 60 mLs by mouth 2 times daily for 2 doses 120 mL 0    sertraline (ZOLOFT) 50 MG tablet Take 1 tablet by mouth daily 30 tablet 0    insulin week: Not on file     Minutes per session: Not on file    Stress: Not on file   Relationships    Social connections:     Talks on phone: Not on file     Gets together: Not on file     Attends Protestant service: Not on file     Active member of club or organization: Not on file     Attends meetings of clubs or organizations: Not on file     Relationship status: Not on file    Intimate partner violence:     Fear of current or ex partner: Not on file     Emotionally abused: Not on file     Physically abused: Not on file     Forced sexual activity: Not on file   Other Topics Concern    Not on file   Social History Narrative    Not on file        Infusion Medications:    sodium chloride 75 mL/hr at 20 1553     Scheduled Medications:    sodium chloride flush  10 mL Intravenous 2 times per day    atorvastatin  40 mg Oral Nightly    [START ON 2020] aspirin  81 mg Oral Daily    heparin (porcine)  5,000 Units Subcutaneous 3 times per day     PRN Meds: morphine, sodium chloride flush, ondansetron, senna, acetaminophen    Labs:   Recent Labs     20  1145   WBC 10.4   HGB 13.0   HCT 38.4        Recent Labs     20  1145      K 5.7*      CO2 24   BUN 45*   CREATININE 1.42*   CALCIUM 9.3     Recent Labs     20  1145   AST 26   ALT 24   BILITOT <0.2   ALKPHOS 105     No results for input(s): INR in the last 72 hours. Recent Labs     20  1145   TROPONINI <0.010       Urinalysis:   Lab Results   Component Value Date    NITRU Negative 2019    BLOODU Negative 2019    SPECGRAV 1.018 2019    GLUCOSEU 250 2019       Radiology:   Most recent    Chest CT      WITH CONTRAST:No results found for this or any previous visit. WITHOUT CONTRAST: No results found for this or any previous visit.     CXR      2-view:   Results for orders placed during the hospital encounter of 20   XR CHEST STANDARD (2 VW)    Narrative Patient MRN: [id number]    : 1955    Age:  60 years    Gender: Female    Order Date: 2/11/2020 11:45 AM.     Exam: XR CHEST (2 VW)    Number of Views: 3     Indication:  Chest pain x4 days. Comparison: None    Findings: Median sternotomy wires. Cardiomediastinal silhouette is within normal limits. Lungs are clear without evidence of infiltrate/pneumonia, pneumothorax or pleural effusion      Impression No radiographic evidence of acute cardiopulmonary disease         Portable: No results found for this or any previous visit. Echo No results found for this or any previous visit. Assessment/Plan:    # Chest pain: Will trend and continue to assess. Cardio consult to exclude ACS or CAD. Goal K and Mg 4.0 and 2.0, respectively. EKG in AM. Telemetry ordered. # PAD: US of LLE done to r/o arterial occlusion. US shows 50-70% stenosis between distal L femoral and popliteal. Patient had Aortogram with right lower extremity runoff and Right peroneal artery angioplasty with Dr. Frank Diaz in November. Patient to see Dr. Frank Diaz for left lower extremity outpatient. # Chronic Heart failure: Goal K and Mg 4.0 and 2.0, respectively. On ASA, statin, BB,  ACEI/ARB. Telemetry. Daily weights, fluid restriction, Cardiac diet. Monitor for signs/ symptoms of volume overload. Elevate lower exts while at rest    # Mild Hyperkalemia: Giving kayexalate in ED,  Repeat BMP ordered for later today. # CKD stage III: Stable. Monitor urine output. Check BMP in AM.     # HTN: Stable. 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 Continue vascular assessment and wound care. Home dose Lyrica, fall precautions. Dressing orders: 1. Cleanse wound(s) with normal saline.   2. Apply dry SILVERCEL OR CALCIUM ALGINATE WITH Ag or eqivalent to wound bed. Gently tuck in undermining area. 3. Cover with 4x4's and wrap with gauze (makenzie or kerlix)  4. Change  Every other day or Monday, Wednesday, and Friday  5. Wear offloading shoe       I personally spent estimated 60 minutes with this patient today. Additional work up or/and treatment plan may be added today or then after based on clinical progression. I am managing a portion of pt care. Some medical issues are handled by other specialists. Additional work up and treatment should be done in out pt setting by pt PCP and other out pt providers. In addition to examining and evaluating pt, I spent additional time explaining care, normaland abnormal findings, and treatment plan. All of pt questions were answered. Counseling, diet and education were provided. Case will be discussed with nursing staff when appropriate. Family will be updated if and when appropriate. Electronically signed by ESDRAS Bean CNP on 2/11/2020 at 4:07 PM     Attending Note:  I evaluated the patient, performed a physical exam, reviewed all the data, and spent >30 minutes in this encounter. I reviewed the mid-level provider's documentation and agree with the above findings. History of PVD, CAD with CABG in June 2019 at [facility] presents with 4d history of intermittent pressure-like substernal chest pain with associated dyspnea. ACS r/o. [facility] to evaluate.      Rajiv Melendrez,

## 2020-02-11 NOTE — ED PROVIDER NOTES
[address] [facility] ED  eMERGENCYdEPARTMENT eNCOUnter      Pt Name: Mickey Gu  MRN: [id number]  Medinagflisa 1955  Date of evaluation: 2020  Kiki Arnold MD    CHIEF COMPLAINT           HPI  Mickey Gu is a 60 y.o. female per chart review has a h/o PVD, DM II, HTN, Hpl, CHF, hypothyroidism presents to the ED with chest pain, L foot pain. Pt notes gradual onset, moderate, constant, sharp, substernal chest pain x 4 days. Pt also notes throbbing L foot pain with redness x 3 weeks. Pt denies fever, n/v, sob, dysuria, diarrhea. ROS  Review of Systems   Constitutional: Negative for activity change, chills and fever. HENT: Negative for ear pain and sore throat. Eyes: Negative for visual disturbance. Respiratory: Negative for cough and shortness of breath. Cardiovascular: Positive for chest pain. Negative for palpitations and leg swelling. Gastrointestinal: Negative for abdominal pain, diarrhea, nausea and vomiting. Genitourinary: Negative for dysuria. Musculoskeletal: Negative for back pain. L foot pain   Skin: Negative for rash. Neurological: Negative for dizziness and weakness. Except as noted above the remainder of the review of systems was reviewed and negative.        PAST MEDICAL HISTORY     Past Medical History:   Diagnosis Date    Asthma     CAD (coronary artery disease)     Colitis     Diabetes mellitus ([address]     Hyperlipidemia     Hypertension     Prolonged emergence from general anesthesia     PVD (peripheral vascular disease) ([address]     Thyroid disease          SURGICAL HISTORY       Past Surgical History:   Procedure Laterality Date    CARDIAC SURGERY       SECTION      COLONOSCOPY N/A 2019    COLONOSCOPY DIAGNOSTIC performed by Erlin Hernandez MD at [address] [place] [address] GRAFT  2019    unknown vessels    HYSTERECTOMY      TOE AMPUTATION Right     3rd toe per week: Not on file     Minutes per session: Not on file    Stress: Not on file   Relationships    Social connections:     Talks on phone: Not on file     Gets together: Not on file     Attends Mandaeism service: Not on file     Active member of club or organization: Not on file     Attends meetings of clubs or organizations: Not on file     Relationship status: Not on file    Intimate partner violence:     Fear of current or ex partner: Not on file     Emotionally abused: Not on file     Physically abused: Not on file     Forced sexual activity: Not on file   Other Topics Concern    Not on file   Social History Narrative    Not on file         PHYSICAL EXAM       ED Triage Vitals [02/11/20 1133]   BP Temp Temp Source Pulse Resp SpO2 Height Weight   107/66 98.3 °F (36.8 °C) Temporal 69 18 98 % 5' 4\" (1.626 m) 231 lb (104.8 kg)       Physical Exam  Vitals signs and nursing note reviewed. Constitutional:       Appearance: She is well-developed. HENT:      Head: Normocephalic. Right Ear: External ear normal.      Left Ear: External ear normal.   Eyes:      Conjunctiva/sclera: Conjunctivae normal.      Pupils: Pupils are equal, round, and reactive to light. Neck:      Musculoskeletal: Normal range of motion and neck supple. Cardiovascular:      Rate and Rhythm: Normal rate and regular rhythm. Heart sounds: Normal heart sounds. Pulmonary:      Effort: Pulmonary effort is normal.      Breath sounds: Normal breath sounds. Abdominal:      General: Bowel sounds are normal. There is no distension. Palpations: Abdomen is soft. Tenderness: There is no abdominal tenderness. Musculoskeletal: Normal range of motion. Comments: 1+ L DP pulse. L foot cool to touch.  +Erythema noted. Skin:     General: Skin is warm and dry. Neurological:      Mental Status: She is alert and oriented to person, place, and time.    Psychiatric:         Mood and Affect: Mood normal.           MDM  59 yo

## 2020-02-11 NOTE — FLOWSHEET NOTE
Pt eating dinner. Vitals are stable. NSR on monitor. Admission assessment in progress.  at bedside. Will monitor pt.  Electronically signed by Prateek Goodrich RN on 2/11/2020 at 6:43 PM

## 2020-02-11 NOTE — ED NOTES
Attempt to call report and Judith Oliveira to call me back.       Marielena Chavez RN  02/11/20 5939

## 2020-02-12 VITALS
SYSTOLIC BLOOD PRESSURE: 111 MMHG | BODY MASS INDEX: 37.05 KG/M2 | OXYGEN SATURATION: 97 % | RESPIRATION RATE: 16 BRPM | TEMPERATURE: 97.9 F | HEIGHT: 64 IN | WEIGHT: 217 LBS | HEART RATE: 78 BPM | DIASTOLIC BLOOD PRESSURE: 47 MMHG

## 2020-02-12 LAB
ALBUMIN SERPL-MCNC: 3.2 G/DL (ref 3.5–4.6)
ALP BLD-CCNC: 98 U/L (ref 40–130)
ALT SERPL-CCNC: 19 U/L (ref 0–33)
ANION GAP SERPL CALCULATED.3IONS-SCNC: 10 MEQ/L (ref 9–15)
AST SERPL-CCNC: 17 U/L (ref 0–35)
BILIRUB SERPL-MCNC: 0.3 MG/DL (ref 0.2–0.7)
BUN BLDV-MCNC: 40 MG/DL (ref 8–23)
CALCIUM SERPL-MCNC: 9.1 MG/DL (ref 8.5–9.9)
CHLORIDE BLD-SCNC: 102 MEQ/L (ref 95–107)
CHOLESTEROL, TOTAL: 160 MG/DL (ref 0–199)
CO2: 26 MEQ/L (ref 20–31)
CREAT SERPL-MCNC: 1.3 MG/DL (ref 0.5–0.9)
EKG ATRIAL RATE: 66 BPM
EKG ATRIAL RATE: 70 BPM
EKG P AXIS: 61 DEGREES
EKG P AXIS: 62 DEGREES
EKG P-R INTERVAL: 174 MS
EKG P-R INTERVAL: 182 MS
EKG Q-T INTERVAL: 404 MS
EKG Q-T INTERVAL: 424 MS
EKG QRS DURATION: 102 MS
EKG QRS DURATION: 98 MS
EKG QTC CALCULATION (BAZETT): 423 MS
EKG QTC CALCULATION (BAZETT): 457 MS
EKG R AXIS: 32 DEGREES
EKG R AXIS: 40 DEGREES
EKG T AXIS: -46 DEGREES
EKG T AXIS: 193 DEGREES
EKG VENTRICULAR RATE: 66 BPM
EKG VENTRICULAR RATE: 70 BPM
GFR AFRICAN AMERICAN: 50.2
GFR NON-AFRICAN AMERICAN: 41.5
GLOBULIN: 3.6 G/DL (ref 2.3–3.5)
GLUCOSE BLD-MCNC: 136 MG/DL (ref 70–99)
GLUCOSE BLD-MCNC: 153 MG/DL (ref 60–115)
GLUCOSE BLD-MCNC: 365 MG/DL (ref 60–115)
GLUCOSE BLD-MCNC: 82 MG/DL (ref 60–115)
HCT VFR BLD CALC: 37.9 % (ref 37–47)
HDLC SERPL-MCNC: 54 MG/DL (ref 40–59)
HEMOGLOBIN: 12.7 G/DL (ref 12–16)
IRON SATURATION: 43 % (ref 11–46)
IRON: 126 UG/DL (ref 37–145)
LDL CHOLESTEROL CALCULATED: 83 MG/DL (ref 0–129)
MCH RBC QN AUTO: 24.9 PG (ref 27–31.3)
MCHC RBC AUTO-ENTMCNC: 33.4 % (ref 33–37)
MCV RBC AUTO: 74.6 FL (ref 82–100)
PDW BLD-RTO: 18.2 % (ref 11.5–14.5)
PERFORMED ON: ABNORMAL
PERFORMED ON: ABNORMAL
PERFORMED ON: NORMAL
PLATELET # BLD: 224 K/UL (ref 130–400)
POTASSIUM REFLEX MAGNESIUM: 5.2 MEQ/L (ref 3.4–4.9)
RBC # BLD: 5.08 M/UL (ref 4.2–5.4)
SODIUM BLD-SCNC: 138 MEQ/L (ref 135–144)
TOTAL IRON BINDING CAPACITY: 295 UG/DL (ref 178–450)
TOTAL PROTEIN: 6.8 G/DL (ref 6.3–8)
TRIGL SERPL-MCNC: 117 MG/DL (ref 0–150)
TROPONIN: <0.01 NG/ML (ref 0–0.01)
TSH SERPL DL<=0.05 MIU/L-ACNC: 1.09 UIU/ML (ref 0.44–3.86)
WBC # BLD: 9 K/UL (ref 4.8–10.8)

## 2020-02-12 PROCEDURE — 93005 ELECTROCARDIOGRAM TRACING: CPT | Performed by: NURSE PRACTITIONER

## 2020-02-12 PROCEDURE — 84484 ASSAY OF TROPONIN QUANT: CPT

## 2020-02-12 PROCEDURE — 93010 ELECTROCARDIOGRAM REPORT: CPT | Performed by: INTERNAL MEDICINE

## 2020-02-12 PROCEDURE — 85027 COMPLETE CBC AUTOMATED: CPT

## 2020-02-12 PROCEDURE — 6360000002 HC RX W HCPCS: Performed by: INTERNAL MEDICINE

## 2020-02-12 PROCEDURE — 96372 THER/PROPH/DIAG INJ SC/IM: CPT

## 2020-02-12 PROCEDURE — G0378 HOSPITAL OBSERVATION PER HR: HCPCS

## 2020-02-12 PROCEDURE — 83540 ASSAY OF IRON: CPT

## 2020-02-12 PROCEDURE — 83550 IRON BINDING TEST: CPT

## 2020-02-12 PROCEDURE — 6370000000 HC RX 637 (ALT 250 FOR IP): Performed by: NURSE PRACTITIONER

## 2020-02-12 PROCEDURE — 80053 COMPREHEN METABOLIC PANEL: CPT

## 2020-02-12 PROCEDURE — 6360000002 HC RX W HCPCS: Performed by: EMERGENCY MEDICINE

## 2020-02-12 PROCEDURE — 84443 ASSAY THYROID STIM HORMONE: CPT

## 2020-02-12 PROCEDURE — 80061 LIPID PANEL: CPT

## 2020-02-12 PROCEDURE — 36415 COLL VENOUS BLD VENIPUNCTURE: CPT

## 2020-02-12 PROCEDURE — 2580000003 HC RX 258: Performed by: NURSE PRACTITIONER

## 2020-02-12 PROCEDURE — 96376 TX/PRO/DX INJ SAME DRUG ADON: CPT

## 2020-02-12 PROCEDURE — 6360000002 HC RX W HCPCS: Performed by: NURSE PRACTITIONER

## 2020-02-12 RX ORDER — INSULIN GLARGINE 100 [IU]/ML
35 INJECTION, SOLUTION SUBCUTANEOUS NIGHTLY
Status: ON HOLD | COMMUNITY
Start: 2020-02-12 | End: 2020-05-12 | Stop reason: SDUPTHER

## 2020-02-12 RX ADMIN — PREGABALIN 50 MG: 50 CAPSULE ORAL at 08:31

## 2020-02-12 RX ADMIN — CLOPIDOGREL BISULFATE 75 MG: 75 TABLET ORAL at 08:31

## 2020-02-12 RX ADMIN — LISINOPRIL 10 MG: 10 TABLET ORAL at 08:31

## 2020-02-12 RX ADMIN — PREGABALIN 50 MG: 50 CAPSULE ORAL at 16:25

## 2020-02-12 RX ADMIN — Medication 10 ML: at 08:43

## 2020-02-12 RX ADMIN — VITAMIN D, TAB 1000IU (100/BT) 2000 UNITS: 25 TAB at 08:30

## 2020-02-12 RX ADMIN — METOPROLOL SUCCINATE 50 MG: 50 TABLET, EXTENDED RELEASE ORAL at 08:31

## 2020-02-12 RX ADMIN — ASPIRIN 81 MG 81 MG: 81 TABLET ORAL at 08:31

## 2020-02-12 RX ADMIN — ENOXAPARIN SODIUM 30 MG: 30 INJECTION SUBCUTANEOUS at 11:21

## 2020-02-12 RX ADMIN — FOLIC ACID 1 MG: 1 TABLET ORAL at 08:31

## 2020-02-12 RX ADMIN — INSULIN LISPRO 10 UNITS: 100 INJECTION, SOLUTION INTRAVENOUS; SUBCUTANEOUS at 08:33

## 2020-02-12 RX ADMIN — MORPHINE SULFATE 4 MG: 2 INJECTION, SOLUTION INTRAMUSCULAR; INTRAVENOUS at 03:22

## 2020-02-12 RX ADMIN — LEVOTHYROXINE SODIUM 50 MCG: 50 TABLET ORAL at 06:28

## 2020-02-12 RX ADMIN — ONDANSETRON 4 MG: 2 INJECTION INTRAMUSCULAR; INTRAVENOUS at 06:28

## 2020-02-12 RX ADMIN — CYANOCOBALAMIN TAB 500 MCG 1000 MCG: 500 TAB at 08:31

## 2020-02-12 RX ADMIN — INSULIN LISPRO 10 UNITS: 100 INJECTION, SOLUTION INTRAVENOUS; SUBCUTANEOUS at 12:25

## 2020-02-12 RX ADMIN — FUROSEMIDE 40 MG: 40 TABLET ORAL at 08:31

## 2020-02-12 RX ADMIN — SERTRALINE 200 MG: 100 TABLET, FILM COATED ORAL at 08:31

## 2020-02-12 ASSESSMENT — PAIN SCALES - GENERAL: PAINLEVEL_OUTOF10: 7

## 2020-02-12 NOTE — FLOWSHEET NOTE
4529 Am assessment completed. Pt awake and resting in bed. Tearful, anxious and restless.  phone used. dtr at bedside. Reassurance given. Chart and meds given. Doppler check for bilat lower extremities pulses. Noted dry dressing on right foot right toe, previous amputation site 7 months ago. Breakfast completed. Family member at bedside. Electronically signed by Fredy Gibson RN on 2/12/2020 at 10:08 AM    [phone] podiatry by for eval. Electronically signed by Fredy Gibson RN on 2/12/2020 at 11:49 AM    1200 Dr Rubén Molina by bryn irby. assist with translation phone. Electronically signed by Fredy Gibson RN on 2/12/2020 at 1:35 PM    [phone] iv and tele off for discharge. Dinner completed. Instructions reviewed with pt and spouse with use of  Translation video phone. Verbalize understanding. No complaints.  Electronically signed by Fredy Gibson RN on 2/12/2020 at 5:44 PM

## 2020-02-12 NOTE — CONSULTS
mellitus, and hyperlipidemia. She also has a history of depression and schizophrenia. She moved to [place] in 2019. She presented to the emergency department with complaints of chest discomfort in the center of her chest.  Apparently this started about 4 days ago. She describes the discomfort as sharp and constant in nature. She sometimes notes some burning pain. It is sometimes worse with deep inspiration. No other exacerbating or relieving factors noted. She notes that it has lasted almost currently over this 40 day period of time. She received pain medications and it completely resolved. She denies any recurrence of the discomfort. Unfortunately, she is a poor historian even with use of a provided . She does note the discomfort seems much different than the pain she had prior to bypass surgery and subsequent angioplasty and stenting procedure. Allergies:   Allergies   Allergen Reactions    Ambien [Zolpidem Tartrate]     Capoten [Captopril]     Clioquinol     Cogentin [Benztropine]     Depakote [Divalproex Sodium]     Effexor Xr [Venlafaxine Hcl Er]     Geodon [Ziprasidone Hcl]     Lyrica [Pregabalin]     Navane [Thiothixene]     Pamelor [Nortriptyline Hcl]     Remeron [Mirtazapine]     Risperdal [Risperidone]     Trazodone And Nefazodone     Wellbutrin [Bupropion]        Past Medical History:  Past Medical History:   Diagnosis Date    Asthma     CAD (coronary artery disease)     Colitis     Diabetes mellitus [facility]     Hyperlipidemia     Hypertension     Prolonged emergence from general anesthesia     PVD (peripheral vascular disease) ([facility]     Thyroid disease        Past Surgical History:  Past Surgical History:   Procedure Laterality Date    CARDIAC SURGERY       SECTION      COLONOSCOPY N/A 2019    COLONOSCOPY DIAGNOSTIC performed by Thiago Barger MD at [address] [place] [address] GRAFT  2019    unknown vessels    HYSTERECTOMY      TOE AMPUTATION Right     3rd toe       Family History:   No family history on file. Social  History:     Social History     Tobacco Use    Smoking status: Never Smoker    Smokeless tobacco: Never Used   Substance Use Topics    Alcohol use: Never     Frequency: Never       Home Medications:    Prior to Admission medications    Medication Sig Start Date End Date Taking? Authorizing Provider   sertraline (ZOLOFT) 50 MG tablet Take 4 tablets by mouth daily 2/12/20  Yes Mariah Shelby, DO   insulin glargine (LANTUS) 100 UNIT/ML injection vial Inject 35 Units into the skin nightly 2/12/20  Yes Jaycee Mooney DO   insulin lispro (HUMALOG) 100 UNIT/ML injection vial Inject 10 Units into the skin 3 times daily (with meals) 2/12/20  Yes Mariah Shelby, DO   hydrOXYzine (VISTARIL) 50 MG capsule Take 50 mg by mouth 2 times daily as needed for Itching   Yes Historical Provider, MD   haloperidol (HALDOL) 5 MG tablet Take 5 mg by mouth daily   Yes Historical Provider, MD   pregabalin (LYRICA) 50 MG capsule Take 1 capsule by mouth 3 times daily for 30 days. 2/7/20 3/8/20 Yes Saranya Obrien MD   FreeStyle Lancets MISC 1 each by Does not apply route daily 1/30/20  Yes David Pearson MD   blood glucose test strips (FREESTYLE LITE) strip 1 each by In Vitro route daily As needed.  1/30/20  Yes David Pearson MD   Alcohol Swabs (ALCOHOL PREP) 70 % PADS qid 1/30/20  Yes David Pearson MD   Insulin Pen Needle (NOVOFINE) 32G X 6 MM MISC Qid 1/30/20  Yes David Pearson MD   atorvastatin (LIPITOR) 40 MG tablet Take 40 mg by mouth daily   Yes Historical Provider, MD   aspirin 81 MG chewable tablet Take 81 mg by mouth daily   Yes Historical Provider, MD   folic acid (FOLVITE) 1 MG tablet Take 1 mg by mouth daily   Yes Historical Provider, MD   Iron Polysacch [id number]-[id number] (NIFEREX-150 FORTE PO) Take by mouth   Yes Historical Provider, MD   clopidogrel (PLAVIX) 75 MG tablet Take 75 mg by mouth daily   Yes Historical tablet 8.6 mg  1 tablet Oral Daily PRN Kyrie Carton, APRN - CNP        acetaminophen (TYLENOL) tablet 650 mg  650 mg Oral Q4H PRN Kyrie Carton, APRN - CNP        glucose (GLUTOSE) 40 % oral gel 15 g  15 g Oral PRN Kyrie Carton, APRN - CNP        dextrose 50 % IV solution  12.5 g Intravenous PRN Kyrie Carton, APRN - CNP        glucagon (rDNA) injection 1 mg  1 mg Intramuscular PRN Kyrie Carton, APRN - CNP        dextrose 5 % solution  100 mL/hr Intravenous PRN Kyrie Carton, APRN - CNP        insulin lispro (HUMALOG) injection vial 0-12 Units  0-12 Units Subcutaneous TID WC Kyrie Carton, APRN - CNP   2 Units at 02/12/20 7924    insulin lispro (HUMALOG) injection vial 0-6 Units  0-6 Units Subcutaneous Nightly Kyrie Carton, APRN - CNP   3 Units at 02/11/20 2241    Vitamin D (CHOLECALCIFEROL) tablet 2,000 Units  2,000 Units Oral Daily Kyrie Carton, APRN - CNP   2,000 Units at 02/12/20 0830    clopidogrel (PLAVIX) tablet 75 mg  75 mg Oral Daily Kyrie Carton, APRN - CNP   75 mg at 02/12/20 0831    vitamin B-12 (CYANOCOBALAMIN) tablet 1,000 mcg  1,000 mcg Oral Daily Kyrie Carton, APRN - CNP   1,000 mcg at 97/69/18 4524    folic acid (FOLVITE) tablet 1 mg  1 mg Oral Daily Kyrie Carton, APRN - CNP   1 mg at 02/12/20 0831    furosemide (LASIX) tablet 40 mg  40 mg Oral Daily Kyrie Carton, APRN - CNP   40 mg at 02/12/20 0831    insulin glargine (LANTUS) injection vial 35 Units  35 Units Subcutaneous Nightly Kyrie Carton, APRN - CNP        insulin lispro (HUMALOG) injection vial 10 Units  10 Units Subcutaneous TID WC Kyrie Carton, APRN - CNP   10 Units at 02/12/20 0833    levothyroxine (SYNTHROID) tablet 50 mcg  50 mcg Oral Daily Kyrie Carton, APRN - CNP   50 mcg at 02/12/20 7042    lisinopril (PRINIVIL;ZESTRIL) tablet 10 mg  10 mg Oral Daily Kyrie Carton, APRN - CNP   10 mg at 02/12/20 0831    metoprolol succinate (TOPROL XL) extended release tablet 50 mg  50 mg Oral Daily ESDRAS Cardenas - CNP   50 mg at 02/12/20 0831    pregabalin (LYRICA) capsule 50 mg  50 mg Oral TID Eyad AmsESDRAS lambert - CNP   50 mg at 02/12/20 0831    sertraline (ZOLOFT) tablet 200 mg  200 mg Oral Daily Eyad Mackenzie APRZARA - CNP   200 mg at 02/12/20 0831       ROS:   12 point review of systems was obtained in detail and is negative other than that noted above or below. Vital Signs:  Vitals:    02/11/20 1717 02/12/20 0100 02/12/20 0611 02/12/20 0744   BP: (!) 135/55 (!) 132/57  (!) 129/48   Pulse: 69 70  76   Resp: 18 17  16   Temp: 97.6 °F (36.4 °C) 98.2 °F (36.8 °C)  98.4 °F (36.9 °C)   TempSrc: Oral Oral  Oral   SpO2: 100%   100%   Weight: 218 lb 14.4 oz (99.3 kg)  217 lb (98.4 kg)    Height: 5' 4\" (1.626 m)          Intake/Output Summary (Last 24 hours) at 2/12/2020 1150  Last data filed at 2/12/2020 0757  Gross per 24 hour   Intake 1410 ml   Output 500 ml   Net 910 ml       Patient Vitals for the past 96 hrs (Last 3 readings):   Weight   02/12/20 0611 217 lb (98.4 kg)   02/11/20 1717 218 lb 14.4 oz (99.3 kg)   02/11/20 1133 231 lb (104.8 kg)       Physical Examination:  GENERAL APPEARANCE: Well developed, well nourished, in no acute distress. CHEST: Symmetric and non-tender. Well-healed sternotomy. INTEGUMENT: Skin warm and dry, without gross excoriationis or lesions. HEENT: No gross abnormalities of conjunctiva, teeth, gums, oral mucosa  NECK: Supple, no JVD, no bruit. Thyroid not palpable. Carotid upstrokes normal.  NEURO/PSHCY: Alert and oriented x3; appropriate behavior and responses and responses, grossly normal cerebellar function with normal balance and coordination  LUNGS: Clear to auscultation bilaterally; normal respiratory effort. HEART: Rate and rhythm regular with 1 out of 6 systolic ejection murmur left sternal border; no gallop appreciated. There are no rubs, clicks or heaves. PMI nondisplaced. ABDOMEN: Soft, nontender, no palpable hepatosplenomegaly, no mases, no bruits.  Abdominal aorta not noted 10/2019 with patent LIMA to LAD, 80% proximal LAD, severe instent stenosis of the circumflex. Status post PCI/Stent to the proximal circumflex. Normal LVEF. Ischemic cardiomyopathy with apical hypokinesia LVEF 50%. PVOD post right lower extremity revascularization,  and [facility] Dr Percy Lugo. PTA right SFA/popliteal/AT 6/2019 at [facility]. Carotid artery disease with ultrasound noted 50-69% bilateral stenoses May 2019. Syncope, presyncope  History of heart failure  Hypothyroidism  Hypertension  Hyperlipidemia  Diabetes  Depression  Schizophrenia  Family history of coronary artery disease  Multiple allergies as noted  Otherwise as per assessment below. Recommendations:    Patient appears to be stable for cardiovasular standpoint. Okay to discharge and plan follow-up and outpatient basis with Dr. Mary Ellen Cortes. Continue same cardiac medications. Patient was advised to seek medical attention immediately if she develops any new or worsening symptoms. Thank you for the opportunity to participate in the care of your patient. Do not hesitate to call if you have any questions.     Electronically signed by Harika Lino MD, [facility] on 2/12/2020 at 11:50 AM

## 2020-02-12 NOTE — PROGRESS NOTES
bilaterally, normal effort  Heart: regular rate and rhythm, no murmur  Abdomen: soft, nontender, nondistended, bowel sounds present, no masses  Extremities: Cool lower extremities with darkening of remaining toes on right foot. Light pink erythema around toes of left foot. No tenderness to palpation. Skin: no gross lesions, rashes    Data:     Labs:  Recent Labs     02/11/20  1145 02/12/20  0620   WBC 10.4 9.0   HGB 13.0 12.7    224     Recent Labs     02/11/20  1145 02/12/20  0620    138   K 5.7* 5.2*    102   CO2 24 26   BUN 45* 40*   CREATININE 1.42* 1.30*   GLUCOSE 207* 136*     Recent Labs     02/11/20  1145 02/12/20  0620   AST 26 17   ALT 24 19   BILITOT <0.2 0.3   ALKPHOS 105 98       Assessment and Plan:        60-year-old female with PVD (history of right toe amputations), obesity, type 2 diabetes, CAD (CABG June 2019) presents with 4 days of intermittent substernal chest pain with associated dyspnea. 1.  Chest pain in patient with significant CAD  -Continue home cardiac medications including dapt, high intensity statin, beta-blocker, ACE inhibitor. Cardiology consulted to determine further testing or advancement of anti-anginal medications    2. PVD: will follow-up with Dr. Bryant Haas vascular surgery as outpatient. Podiatry consulted to provide in-house local care    3. Type 2 diabetes with hyperglycemia    4. CKD 3    5. Chronic diastolic heart failure    Diet: DIET CARDIAC; No Caffeine  Ppx: lovenox  Full Code    Dispo: Observation for chest pain evaluation.  Will discharge home when cardiology evaluation complete    Electronically signed by Klaus López DO on 2/12/2020 at 9:57 AM

## 2020-02-13 NOTE — DISCHARGE SUMMARY
[facility] Medicine Discharge Summary    Mickey Gu  :  1955  MRN:  [id number]    Admit date:  2020  Discharge date:  2020    Admitting Physician:  Jayesh Melton DO  Primary Care Physician:  Tereza Hicks    Discharge Diagnoses:    Principal Problem:    Chest pain  Resolved Problems:    * No resolved hospital problems. *    Chief Complaint   Patient presents with    Chest Pain     Times four days. Condition: improved  Activity: no restrictions  Diet: cardiac  Disposition: home  Functional Status: ambulatory    Significant Findings:     3 negative troponin    CXR: No radiographic evidence of acute cardiopulmonary disease    Hospital Course:   73-year-old female with PVD (history of right toe amputations), obesity, type 2 diabetes, CAD (CABG 2019) presents with 4 days of intermittent substernal chest pain with associated dyspnea. ACS was ruled out. Her management was conducted by [facility] group and they made recommendations as detailed in their separate note. She has known PVD for which she follows with Vascular Surgery- she was also seen by Podiatry in house to provide local care and arrange follow up. She was discharged after she had cardiology evaluation and was cleared from their standpoint. Discharge Medication List:   Paul Zulma   Home Medication Instructions KRW:[id number]    Printed on:20 6840   Medication Information                      Alcohol Swabs (ALCOHOL PREP) 70 % PADS  qid             aspirin 81 MG chewable tablet  Take 81 mg by mouth daily             atorvastatin (LIPITOR) 40 MG tablet  Take 40 mg by mouth daily             blood glucose test strips (FREESTYLE LITE) strip  1 each by In Vitro route daily As needed.              Cholecalciferol (VITAMIN D) 2000 units CAPS capsule  Take 2,000 Units by mouth daily              Cholecalciferol (VITAMIN D3) 80677 units CAPS  Take by

## 2020-02-17 ENCOUNTER — HOSPITAL ENCOUNTER (OUTPATIENT)
Dept: HYPERBARIC MEDICINE | Age: 65
Discharge: HOME | End: 2020-02-17
Payer: COMMERCIAL

## 2020-02-17 VITALS
HEART RATE: 70 BPM | TEMPERATURE: 97.4 F | RESPIRATION RATE: 18 BRPM | SYSTOLIC BLOOD PRESSURE: 110 MMHG | DIASTOLIC BLOOD PRESSURE: 70 MMHG

## 2020-02-17 LAB
GLUCOSE BLD-MCNC: 190 MG/DL (ref 60–115)
GLUCOSE BLD-MCNC: 266 MG/DL (ref 60–115)
PERFORMED ON: ABNORMAL
PERFORMED ON: ABNORMAL

## 2020-02-17 PROCEDURE — 99183 HYPERBARIC OXYGEN THERAPY: CPT | Performed by: SURGERY

## 2020-02-17 PROCEDURE — G0277 HBOT, FULL BODY CHAMBER, 30M: HCPCS

## 2020-02-17 NOTE — PROGRESS NOTES
Hyperbaric Oxygen Therapy   Progress Note    NAME: Garth Mota RECORD NUMBER:  [id number]  AGE: 60 y.o. GENDER: female  : 1955  EPISODE DATE:  2020   Subjective   HBO   Treatment Number: 39 out of Total Treatments: 60    HBO Diagnosis:   Fiordaliza Bates: Fiordaliza Bates 3(right foot thired toe )  Indications: Lower Extremity Diabetic Wound ___(site)  Safety checks performed prior to treatment. See doc flowsheets for documentation. Objective        Recent Labs     20  1047 20  1300   POCGLU 266* 190*     Pre treatment Vital Signs       Temp: 97.2 °F (36.2 °C)     Pulse: 66     Resp: 18     BP: 110/60     Post treatment Vital Signs  Temp: 97.4 °F (36.3 °C)  Pulse: 70  Resp: 18  BP: 110/70  Assessment      Physical Exam:  General Appearance:  alert and oriented to person, place and time, well-developed and well-nourished, in no acute distress, alert and oriented to person, place and time and well-developed and well nourished  ENT:  tympanic membranes intact bilaterally, normal color  Pulmonary/Chest:  clear to auscultation bilaterally- no wheezes, rales or rhonchi, normal air movement, no respiratory distress and no chest wall tenderness  Cardiovascular:  normal, regular rate and rhythm     Treatment Start Time: 1100     Pressure Reached Time: 1115  DOROTHY : 2  Number of Air Breaks:  Treatment Status: No Air break     Decompression Time: 1235   Treatment End Time: 1250  Length of Treatment: 90 Minutes  Symptoms Noted During Treatment: None    Adverse Event: no    I was present on these premises and immediately available to furnish assistance & direction throughout the procedure. Brenda Mars is a 60 y.o. female  did successfully complete today's hyperbaric oxygen treatment at [address] [facility].     In my clinical judgement, ongoing HBO therapy is  necessary at this time, given a threat to patient function, limb or life from the current condition. Supervision and attendance of Hyperbaric Oxygen Therapy provided. Continue HBO treatment as outlined in the treatment plan. Hyperbaric Oxygen: Alyssa Campoverde tolerated   well today without complications. Discharge Instructions were explained and given to Ms. Ty Stephens     Electronically signed by Melanie Javed MD on 2/17/2020 at 1:15 PM

## 2020-02-18 NOTE — PLAN OF CARE
Problem: Wound:  Goal: Will show signs of wound healing; wound closure and no evidence of infection  Description  Will show signs of wound healing; wound closure and no evidence of infection  Outcome: Ongoing     Problem: Blood Glucose:  Goal: Ability to maintain appropriate glucose levels will improve  Description  Ability to maintain appropriate glucose levels will improve  Outcome: Ongoing     Problem: Physical Regulation:  Goal: Tolerate HBO therapy and barotrauma will be prevented  Description  Tolerate HBO therapy and barotrauma will be prevented  Outcome: Met This Shift

## 2020-02-19 ENCOUNTER — HOSPITAL ENCOUNTER (OUTPATIENT)
Dept: HYPERBARIC MEDICINE | Age: 65
Discharge: HOME | End: 2020-02-19
Payer: COMMERCIAL

## 2020-02-19 VITALS
HEART RATE: 68 BPM | TEMPERATURE: 98.1 F | RESPIRATION RATE: 18 BRPM | SYSTOLIC BLOOD PRESSURE: 168 MMHG | DIASTOLIC BLOOD PRESSURE: 75 MMHG

## 2020-02-19 LAB
GLUCOSE BLD-MCNC: 145 MG/DL (ref 60–115)
GLUCOSE BLD-MCNC: 182 MG/DL (ref 60–115)
PERFORMED ON: ABNORMAL
PERFORMED ON: ABNORMAL

## 2020-02-19 PROCEDURE — G0277 HBOT, FULL BODY CHAMBER, 30M: HCPCS

## 2020-02-19 PROCEDURE — 99183 HYPERBARIC OXYGEN THERAPY: CPT | Performed by: SURGERY

## 2020-02-20 ENCOUNTER — HOSPITAL ENCOUNTER (OUTPATIENT)
Dept: ULTRASOUND IMAGING | Age: 65
Discharge: HOME | End: 2020-02-22
Payer: COMMERCIAL

## 2020-02-20 ENCOUNTER — HOSPITAL ENCOUNTER (OUTPATIENT)
Dept: HYPERBARIC MEDICINE | Age: 65
Discharge: HOME | End: 2020-02-20
Payer: COMMERCIAL

## 2020-02-20 VITALS
DIASTOLIC BLOOD PRESSURE: 58 MMHG | SYSTOLIC BLOOD PRESSURE: 132 MMHG | HEART RATE: 59 BPM | TEMPERATURE: 96.7 F | RESPIRATION RATE: 18 BRPM

## 2020-02-20 LAB
GLUCOSE BLD-MCNC: 186 MG/DL (ref 60–115)
GLUCOSE BLD-MCNC: 284 MG/DL (ref 60–115)
PERFORMED ON: ABNORMAL
PERFORMED ON: ABNORMAL

## 2020-02-20 PROCEDURE — G0277 HBOT, FULL BODY CHAMBER, 30M: HCPCS

## 2020-02-20 PROCEDURE — 93971 EXTREMITY STUDY: CPT

## 2020-02-20 NOTE — PROGRESS NOTES
Hyperbaric Oxygen Therapy   Progress Note    NAME: Garth Mota RECORD NUMBER:  [id number]  AGE: 60 y.o. GENDER: female  : 1955  EPISODE DATE:  2020   Subjective   HBO Treatment Number: 52 out of Total Treatments: 60  HBO Diagnosis:   Apoorva ServerMarqubert Borednle 3  Indications: Lower Extremity Diabetic Wound ___(site)(right foot third toe )  Safety checks performed prior to treatment. See doc flowsheets for documentation. Objective        Recent Labs     20  1031 20  1303   POCGLU 284* 186*     Pre treatment Vital Signs       Temp: 97.6 °F (36.4 °C)     Pulse: 63     Resp: 16     BP: (!) 117/51     Post treatment Vital Signs  Temp: 97.6 °F (36.4 °C)   Pulse 59  Resp: 16  BP  132/58  Assessment      Physical Exam:  General Appearance:  alert and oriented to person, place and time, well-developed and well-nourished, in no acute distress  ENT:  tympanic membranes intact bilaterally  Pulmonary/Chest:  clear to auscultation bilaterally- no wheezes, rales or rhonchi, normal air movement, no respiratory distress  Cardiovascular:  regular rate and rhythm  Chamber #: [id number]              Number of Air Breaks: Adverse Event: no    I was present on these premises and immediately available to furnish assistance & direction throughout the procedure. Plan      Alyssa Campoverde is a 60 y.o. female  did successfully complete today's hyperbaric oxygen treatment at [address] [facility]. In my clinical judgement, ongoing HBO therapy is  necessary at this time, given a threat to patient function, limb or life from the current condition. Supervision and attendance of Hyperbaric Oxygen Therapy provided. Continue HBO treatment as outlined in the treatment plan. Hyperbaric Oxygen: Alyssa Campoverde tolerated Treatment Number: 52 well today without complications. Discharge Instructions were explained and given to Ms. Ty Stephens Electronically signed by ESDRAS Alvarez NP on 2/20/2020 at 2:31 PM

## 2020-02-21 ENCOUNTER — HOSPITAL ENCOUNTER (OUTPATIENT)
Dept: WOUND CARE | Age: 65
Discharge: HOME | End: 2020-02-21
Payer: COMMERCIAL

## 2020-02-21 ENCOUNTER — HOSPITAL ENCOUNTER (OUTPATIENT)
Dept: HYPERBARIC MEDICINE | Age: 65
Discharge: HOME | End: 2020-02-21
Payer: COMMERCIAL

## 2020-02-21 VITALS
TEMPERATURE: 98.6 F | RESPIRATION RATE: 18 BRPM | HEART RATE: 59 BPM | DIASTOLIC BLOOD PRESSURE: 57 MMHG | SYSTOLIC BLOOD PRESSURE: 107 MMHG

## 2020-02-21 VITALS
DIASTOLIC BLOOD PRESSURE: 57 MMHG | SYSTOLIC BLOOD PRESSURE: 107 MMHG | TEMPERATURE: 98.6 F | HEART RATE: 59 BPM | RESPIRATION RATE: 18 BRPM

## 2020-02-21 PROBLEM — I70.245 ATHSCL NATIVE ARTERIES OF LEFT LEG W ULCERATION OTH PRT FOOT (HCC): Status: ACTIVE | Noted: 2019-09-27

## 2020-02-21 LAB
GLUCOSE BLD-MCNC: 103 MG/DL (ref 60–115)
GLUCOSE BLD-MCNC: 127 MG/DL (ref 60–115)
GLUCOSE BLD-MCNC: 164 MG/DL (ref 60–115)
PERFORMED ON: ABNORMAL
PERFORMED ON: ABNORMAL
PERFORMED ON: NORMAL

## 2020-02-21 PROCEDURE — G0277 HBOT, FULL BODY CHAMBER, 30M: HCPCS

## 2020-02-21 PROCEDURE — 99213 OFFICE O/P EST LOW 20 MIN: CPT

## 2020-02-21 PROCEDURE — 99183 HYPERBARIC OXYGEN THERAPY: CPT | Performed by: NURSE PRACTITIONER

## 2020-02-21 PROCEDURE — 99215 OFFICE O/P EST HI 40 MIN: CPT | Performed by: SURGERY

## 2020-02-21 ASSESSMENT — PAIN SCALES - GENERAL: PAINLEVEL_OUTOF10: 0

## 2020-02-21 NOTE — PROGRESS NOTES
none  Quality of pain: N/A  Severity:  0 / 10   Modifying Factors: None  Associated Signs/Symptoms: edema    Ulcer Identification:  Ulcer Type: diabetic  Contributing Factors: edema    Wound: N/A        PAST MEDICAL HISTORY        Diagnosis Date    Asthma     CAD (coronary artery disease)     Colitis     Diabetes mellitus ([facility]     Hyperlipidemia     Hypertension     Prolonged emergence from general anesthesia     PVD (peripheral vascular disease) ([facility]     Thyroid disease        PAST SURGICAL HISTORY    Past Surgical History:   Procedure Laterality Date    CARDIAC SURGERY       SECTION      COLONOSCOPY N/A 2019    COLONOSCOPY DIAGNOSTIC performed by Jovani Stapleton MD at [address] [place] [address] GRAFT  2019    unknown vessels    HYSTERECTOMY      TOE AMPUTATION Right     3rd toe       FAMILY HISTORY    History reviewed. No pertinent family history.     SOCIAL HISTORY    Social History     Tobacco Use    Smoking status: Never Smoker    Smokeless tobacco: Never Used   Substance Use Topics    Alcohol use: Never     Frequency: Never    Drug use: Never       ALLERGIES    Allergies   Allergen Reactions    Ambien [Zolpidem Tartrate]     Capoten [Captopril]     Clioquinol     Cogentin [Benztropine]     Depakote [Divalproex Sodium]     Effexor Xr [Venlafaxine Hcl Er]     Geodon [Ziprasidone Hcl]     Lyrica [Pregabalin]     Navane [Thiothixene]     Pamelor [Nortriptyline Hcl]     Remeron [Mirtazapine]     Risperdal [Risperidone]     Trazodone And Nefazodone     Wellbutrin [Bupropion]        MEDICATIONS    Current Outpatient Medications on File Prior to Encounter   Medication Sig Dispense Refill    sertraline (ZOLOFT) 50 MG tablet Take 4 tablets by mouth daily      insulin glargine (LANTUS) 100 UNIT/ML injection vial Inject 35 Units into the skin nightly      insulin lispro (HUMALOG) 100 UNIT/ML injection vial Inject 10 Units into the skin 3 times daily (with meals)      hydrOXYzine (VISTARIL) 50 MG capsule Take 50 mg by mouth 2 times daily as needed for Itching      haloperidol (HALDOL) 5 MG tablet Take 5 mg by mouth daily      pregabalin (LYRICA) 50 MG capsule Take 1 capsule by mouth 3 times daily for 30 days. 90 capsule 3    FreeStyle Lancets MISC 1 each by Does not apply route daily 100 each 3    blood glucose test strips (FREESTYLE LITE) strip 1 each by In Vitro route daily As needed. 100 each 3    Alcohol Swabs (ALCOHOL PREP) 70 % PADS qid 300 each 06    Insulin Pen Needle (NOVOFINE) 32G X 6 MM MISC Qid 300 each 3    atorvastatin (LIPITOR) 40 MG tablet Take 40 mg by mouth daily      aspirin 81 MG chewable tablet Take 81 mg by mouth daily      folic acid (FOLVITE) 1 MG tablet Take 1 mg by mouth daily      Iron Polysacch Slcne-X25-KO (NIFEREX-150 FORTE PO) Take by mouth      clopidogrel (PLAVIX) 75 MG tablet Take 75 mg by mouth daily      Cyanocobalamin (VITAMIN B-12) 1000 MCG extended release tablet Take 1,000 mcg by mouth daily      Cholecalciferol (VITAMIN D3) 21222 units CAPS Take by mouth Indications: weekly on Sun      Cholecalciferol (VITAMIN D) 2000 units CAPS capsule Take 2,000 Units by mouth daily       lisinopril (PRINIVIL;ZESTRIL) 10 MG tablet Take 10 mg by mouth daily       insulin lispro (HUMALOG) 100 UNIT/ML injection vial Inject 0-6 Units into the skin 3 times daily (with meals) 1 vial 3    metoprolol succinate (TOPROL XL) 50 MG extended release tablet Take 1 tablet by mouth daily 30 tablet 3    levothyroxine (SYNTHROID) 50 MCG tablet Take 50 mcg by mouth Daily      furosemide (LASIX) 40 MG tablet Take 40 mg by mouth daily Indications: M-W-F       meclizine (ANTIVERT) 25 MG tablet Take 25 mg by mouth three times daily       No current facility-administered medications on file prior to encounter.         REVIEW OF SYSTEMS    Constitutional: negative  Respiratory: negative  Cardiovascular: negative  Behavioral/Psych:

## 2020-02-21 NOTE — PROGRESS NOTES
Therapy provided. Continue HBO treatment as outlined in the treatment plan. Hyperbaric Oxygen: Asad Mixer tolerated Treatment Number: 50 well today without complications. Discharge Instructions were explained and given to Ms.  Alexsandra Shahdmont     Electronically signed by ESDRAS Linares NP on 2/21/2020 at 2:12 PM

## 2020-02-25 ENCOUNTER — HOSPITAL ENCOUNTER (OUTPATIENT)
Dept: HYPERBARIC MEDICINE | Age: 65
Discharge: HOME | End: 2020-02-25
Payer: COMMERCIAL

## 2020-02-25 VITALS
HEART RATE: 70 BPM | RESPIRATION RATE: 18 BRPM | TEMPERATURE: 97.6 F | SYSTOLIC BLOOD PRESSURE: 126 MMHG | DIASTOLIC BLOOD PRESSURE: 70 MMHG

## 2020-02-25 LAB
GLUCOSE BLD-MCNC: 290 MG/DL (ref 60–115)
GLUCOSE BLD-MCNC: 326 MG/DL (ref 60–115)
PERFORMED ON: ABNORMAL
PERFORMED ON: ABNORMAL

## 2020-02-25 PROCEDURE — G0277 HBOT, FULL BODY CHAMBER, 30M: HCPCS

## 2020-02-25 NOTE — DISCHARGE INSTR - COC
Continuity of Care Form    Patient Name: Jasiel Oneill   :  1955  MRN:  [id number]    Admit date:  2020  Discharge date:  ***    Code Status Order: Prior   Advance Directives:     Admitting Physician:  No admitting provider for patient encounter. PCP: Nan Longo    Discharging Nurse: [facility] Unit/Room#: No information available for this encounter. Discharging Unit Phone Number: ***    Emergency Contact:   Extended Emergency Contact Information  Primary Emergency Contact: [address] [place] [address] Phone: [phone]  Relation: Spouse    Past Surgical History:  Past Surgical History:   Procedure Laterality Date    CARDIAC SURGERY       SECTION      COLONOSCOPY N/A 2019    COLONOSCOPY DIAGNOSTIC performed by Thiago Barger MD at [address] [place] [address] GRAFT  2019    unknown vessels    HYSTERECTOMY      TOE AMPUTATION Right     3rd toe       Immunization History: There is no immunization history on file for this patient.     Active Problems:  Patient Active Problem List   Diagnosis Code    Severe major depression with psychotic features ([address] F32.3    Type 2 diabetes mellitus with hyperglycemia, with long-term current use of insulin ([facility]) E11.65, Z79.4    HTN (hypertension) I10    HLD (hyperlipidemia) E78.5    Hypothyroid E03.9    HF (heart failure) ([facility]) I50.9    Non-pressure ulcer of toe ([address] L97.509    Atherosclerotic PVD with intermittent claudication ([address] I70.219    Acute osteomyelitis ([address] M86.10    Skin infection L08.9    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 A49.8    Surgical wound dehiscence, initial encounter T81.31XA    S/P amputation of lesser toe, right ([address] Z89.421    Rectal bleeding K62.5    Diabetic ulcer of right foot with bone involvement without evidence of necrosis ([address] E11.621, L97.516    Athscl native arteries of left leg w ulceration oth prt foot ([address] I70.245    JESICA (obstructive sleep apnea) G47.33    Neuropathy due to secondary diabetes ([facility] E13.40    Chest pain R07.9       Isolation/Infection:   Isolation          No Isolation        Patient Infection Status     Infection Onset Added Last Indicated Last Indicated By Review Planned Expiration Resolved Resolved By    MRSA 08/09/19 08/11/19 08/09/19 WOUND CULTURE        MRSA left 3rd toe on 8/9/2019.  Electronically signed by Kristina Romano RN on 8/11/2019 at 9:15 PM           Nurse Assessment:  Last Vital Signs: /70   Pulse 70   Temp 97.6 °F (36.4 °C) (Oral)   Resp 18     Last documented pain score (0-10 scale):    Last Weight:   Wt Readings from Last 1 Encounters:   02/12/20 217 lb (98.4 kg)     Mental Status:  {IP PT MENTAL STATUS:02126}    IV Access:  { ARIANNE IV ACCESS:143090826}    Nursing Mobility/ADLs:  Walking   {P DME BKEE:866822573}  Transfer  {[facility] DME JSIV:972114947}  Bathing  {P DME KAMO:640175676}  Dressing  {CHP DME LGBU:671424358}  Toileting  {P DME LHPH:084691973}  Feeding  {[facility] DME FSAI:337276497}  Med Admin  {P DME FIUY:735248258}  Med Delivery   { ARIANNE MED Delivery:961919394}    Wound Care Documentation and Therapy:  Wound 04/23/19 Toe (Comment  which one) Right #1. 3rd toe (Active)   Wound Image   2/21/2020  1:53 PM   Wound Non-Healing Surgical 2/21/2020  1:53 PM   Offloading for Diabetic Foot Ulcers Post op shoe 2/21/2020  2:50 PM   Wound Cleansed Rinsed/Irrigated with saline 2/21/2020  1:53 PM   Wound Length (cm) 0.6 cm 2/21/2020  1:53 PM   Wound Width (cm) 0.7 cm 2/21/2020  1:53 PM   Wound Depth (cm) 0.6 cm 2/21/2020  1:53 PM   Wound Surface Area (cm^2) 0.42 cm^2 2/21/2020  1:53 PM   Change in Wound Size % (l*w) 47.5 2/21/2020  1:53 PM   Wound Volume (cm^3) 0.25 cm^3 2/21/2020  1:53 PM   Wound Healing % -56 2/21/2020  1:53 PM   Drainage Amount Moderate 2/21/2020  1:53 PM   Drainage Description Serosanguinous 2/21/2020  1:53 PM   Odor None 2/21/2020  1:53 PM   Margins Defined edges 2/21/2020  1:53 PM   Florencia-wound Assessment Maceration 2/21/2020  1:53 PM   Non-staged Wound Description Full thickness 2/21/2020  1:53 PM   Hollister%Wound Bed 100 2/21/2020  1:53 PM   Red%Wound Bed 50 2/7/2020  1:14 PM   Yellow%Wound Bed 50 2/7/2020  1:14 PM   Number of days: 308       Wound 02/21/20 Toe (Comment  which one) Left #2 5th mediaL  (Active)   Wound Image   2/21/2020  2:20 PM   Wound Arterial 2/21/2020  2:20 PM   Offloading for Diabetic Foot Ulcers Post op shoe 2/21/2020  2:50 PM   Wound Cleansed Rinsed/Irrigated with saline 2/21/2020  2:20 PM   Wound Length (cm) 0.6 cm 2/21/2020  2:20 PM   Wound Width (cm) 0.5 cm 2/21/2020  2:20 PM   Wound Depth (cm) 0.1 cm 2/21/2020  2:20 PM   Wound Surface Area (cm^2) 0.3 cm^2 2/21/2020  2:20 PM   Wound Volume (cm^3) 0.03 cm^3 2/21/2020  2:20 PM   Drainage Amount Small 2/21/2020  2:20 PM   Drainage Description Serosanguinous 2/21/2020  2:20 PM   Odor None 2/21/2020  2:20 PM   Margins Defined edges 2/21/2020  2:20 PM   Florencia-wound Assessment Dry; Intact 2/21/2020  2:20 PM   Hollister%Wound Bed 50 2/21/2020  2:20 PM   Red%Wound Bed 30 2/21/2020  2:20 PM   Black%Wound Bed 20 2/21/2020  2:20 PM   Number of days: 4        Elimination:  Continence:   · Bowel: {YES / WC:14286}  · Bladder: {YES / SW:26329}  Urinary Catheter: {Urinary Catheter:873820376}   Colostomy/Ileostomy/Ileal Conduit: {YES / IY:20124}       Date of Last BM: ***  No intake or output data in the 24 hours ending 02/25/20 1530  No intake/output data recorded.     Safety Concerns:     [address] Safety Concerns:891744148}    Impairments/Disabilities:      [address] [employer] Impairments/Disabilities:700184452}    Nutrition Therapy:  Current Nutrition Therapy:   [address] ARIANNE Diet List:691589240}    Routes of Feeding: {CHP DME Other Feedings:862850199}  Liquids: {Slp liquid thickness:91479}  Daily Fluid Restriction: {CHP DME Yes amt example:017148165}  Last Modified Barium Swallow with Video (Video Swallowing Test): {Done Not

## 2020-02-25 NOTE — PROGRESS NOTES
Hyperbaric Oxygen Therapy   Progress Note    NAME: Garth Mota RECORD NUMBER:  [id number]  AGE: 60 y.o. GENDER: female  : 1955  EPISODE DATE:  2020   Subjective   HBO Treatment Number: 52 out of Total Treatments: 60  HBO Diagnosis:   Stephen Casillas Setting 3  Indications: Lower Extremity Diabetic Wound ___(site)(right foot third toe )  Safety checks performed prior to treatment. See doc flowsheets for documentation. Objective        Recent Labs     20  1106 20  1313   POCGLU 326* 290*     Pre treatment Vital Signs       Temp: 96.6 °F (35.9 °C)     Pulse: 72     Resp: 18     BP: 122/62     Post treatment Vital Signs  Temp: 97.6 °F (36.4 °C)  Pulse: 70  Resp: 18  BP: 126/70  Assessment      Physical Exam:  General Appearance:  alert and oriented to person, place and time, well-developed and well-nourished, in no acute distress  ENT:  tympanic membranes intact bilaterally  Pulmonary/Chest:  clear to auscultation bilaterally- no wheezes, rales or rhonchi, normal air movement, no respiratory distress  Cardiovascular:  regular rate and rhythm  Chamber #: [id number]  Treatment Start Time: 1115     Pressure Reached Time: 1125  DOROTHY : 2  Number of Air Breaks:  Treatment Status: No Air break     Decompression Time: 1250   Treatment End Time: 1310  Length of Treatment: 90 Minutes  Symptoms Noted During Treatment: None    Adverse Event: no    I was present on these premises and immediately available to furnish assistance & direction throughout the procedure. Brenda Blevins is a 60 y.o. female  did successfully complete today's hyperbaric oxygen treatment at [address] [place] [address]. In my clinical judgement, ongoing HBO therapy is  necessary at this time, given a threat to patient function, limb or life from the current condition. Supervision and attendance of Hyperbaric Oxygen Therapy provided.   Continue HBO treatment as outlined in the treatment plan. Hyperbaric Oxygen: Joseph Wilman tolerated Treatment Number: 52 well today without complications. Discharge Instructions were explained and given to Ms. Jessica Padron     Electronically signed by ESDRAS Sweeney NP on 2/25/2020 at 1:59 PM

## 2020-02-26 ENCOUNTER — HOSPITAL ENCOUNTER (OUTPATIENT)
Dept: CARDIAC CATH/INVASIVE PROCEDURES | Age: 65
Setting detail: OBSERVATION
Discharge: HOME | End: 2020-02-27
Attending: SURGERY | Admitting: SURGERY
Payer: COMMERCIAL

## 2020-02-26 LAB
ABO/RH: NORMAL
ANION GAP SERPL CALCULATED.3IONS-SCNC: 14 MEQ/L (ref 9–15)
ANTIBODY SCREEN: NORMAL
BUN BLDV-MCNC: 31 MG/DL (ref 8–23)
CALCIUM SERPL-MCNC: 9.1 MG/DL (ref 8.5–9.9)
CHLORIDE BLD-SCNC: 97 MEQ/L (ref 95–107)
CO2: 22 MEQ/L (ref 20–31)
CREAT SERPL-MCNC: 1.46 MG/DL (ref 0.5–0.9)
GFR AFRICAN AMERICAN: 43.9
GFR NON-AFRICAN AMERICAN: 36.3
GLUCOSE BLD-MCNC: 269 MG/DL (ref 70–99)
GLUCOSE BLD-MCNC: 290 MG/DL (ref 60–115)
PERFORMED ON: ABNORMAL
POC ACTIVATED CLOTTING TIME KAOLIN: 158 SEC (ref 82–152)
POC ACTIVATED CLOTTING TIME KAOLIN: 202 SEC (ref 82–152)
POC ACTIVATED CLOTTING TIME KAOLIN: 257 SEC (ref 82–152)
POC ACTIVATED CLOTTING TIME KAOLIN: 290 SEC (ref 82–152)
POC SAMPLE TYPE: ABNORMAL
POTASSIUM SERPL-SCNC: 5.2 MEQ/L (ref 3.4–4.9)
SODIUM BLD-SCNC: 133 MEQ/L (ref 135–144)

## 2020-02-26 PROCEDURE — 75774 ARTERY X-RAY EACH VESSEL: CPT

## 2020-02-26 PROCEDURE — 6360000004 HC RX CONTRAST MEDICATION: Performed by: SURGERY

## 2020-02-26 PROCEDURE — 6360000002 HC RX W HCPCS

## 2020-02-26 PROCEDURE — C1894 INTRO/SHEATH, NON-LASER: HCPCS

## 2020-02-26 PROCEDURE — 86901 BLOOD TYPING SEROLOGIC RH(D): CPT

## 2020-02-26 PROCEDURE — 2500000003 HC RX 250 WO HCPCS

## 2020-02-26 PROCEDURE — 6370000000 HC RX 637 (ALT 250 FOR IP): Performed by: SURGERY

## 2020-02-26 PROCEDURE — 2580000003 HC RX 258

## 2020-02-26 PROCEDURE — 37252 INTRVASC US NONCORONARY 1ST: CPT

## 2020-02-26 PROCEDURE — C1714 CATH, TRANS ATHERECTOMY, DIR: HCPCS

## 2020-02-26 PROCEDURE — C1769 GUIDE WIRE: HCPCS

## 2020-02-26 PROCEDURE — C1884 EMBOLIZATION PROTECT SYST: HCPCS

## 2020-02-26 PROCEDURE — 2500000003 HC RX 250 WO HCPCS: Performed by: SURGERY

## 2020-02-26 PROCEDURE — 37225 HC FEM POP TERRITORY ATHERECTOMY: CPT

## 2020-02-26 PROCEDURE — 75716 ARTERY X-RAYS ARMS/LEGS: CPT

## 2020-02-26 PROCEDURE — 2060000000 HC ICU INTERMEDIATE R&B

## 2020-02-26 PROCEDURE — C1753 CATH, INTRAVAS ULTRASOUND: HCPCS

## 2020-02-26 PROCEDURE — 85347 COAGULATION TIME ACTIVATED: CPT

## 2020-02-26 PROCEDURE — 86850 RBC ANTIBODY SCREEN: CPT

## 2020-02-26 PROCEDURE — 6360000002 HC RX W HCPCS: Performed by: SURGERY

## 2020-02-26 PROCEDURE — 2709999900 HC NON-CHARGEABLE SUPPLY

## 2020-02-26 PROCEDURE — C1725 CATH, TRANSLUMIN NON-LASER: HCPCS

## 2020-02-26 PROCEDURE — C1887 CATHETER, GUIDING: HCPCS

## 2020-02-26 PROCEDURE — 86900 BLOOD TYPING SEROLOGIC ABO: CPT

## 2020-02-26 PROCEDURE — 2580000003 HC RX 258: Performed by: SURGERY

## 2020-02-26 PROCEDURE — 80048 BASIC METABOLIC PNL TOTAL CA: CPT

## 2020-02-26 PROCEDURE — G0378 HOSPITAL OBSERVATION PER HR: HCPCS

## 2020-02-26 RX ORDER — ASPIRIN 81 MG/1
81 TABLET, CHEWABLE ORAL DAILY
Status: DISCONTINUED | OUTPATIENT
Start: 2020-02-26 | End: 2020-02-27 | Stop reason: HOSPADM

## 2020-02-26 RX ORDER — VITAMIN B COMPLEX
2000 TABLET ORAL DAILY
Status: DISCONTINUED | OUTPATIENT
Start: 2020-02-26 | End: 2020-02-27 | Stop reason: HOSPADM

## 2020-02-26 RX ORDER — LEVOTHYROXINE SODIUM 0.05 MG/1
50 TABLET ORAL DAILY
Status: DISCONTINUED | OUTPATIENT
Start: 2020-02-26 | End: 2020-02-27 | Stop reason: HOSPADM

## 2020-02-26 RX ORDER — METOPROLOL TARTRATE 50 MG/1
50 TABLET, FILM COATED ORAL DAILY
Status: ON HOLD | COMMUNITY
End: 2020-04-23 | Stop reason: HOSPADM

## 2020-02-26 RX ORDER — ERGOCALCIFEROL 1.25 MG/1
CAPSULE ORAL WEEKLY
Status: DISCONTINUED | OUTPATIENT
Start: 2020-02-26 | End: 2020-02-27 | Stop reason: HOSPADM

## 2020-02-26 RX ORDER — CLOPIDOGREL BISULFATE 75 MG/1
75 TABLET ORAL DAILY
Status: DISCONTINUED | OUTPATIENT
Start: 2020-02-26 | End: 2020-02-27 | Stop reason: HOSPADM

## 2020-02-26 RX ORDER — SODIUM CHLORIDE 0.9 % (FLUSH) 0.9 %
10 SYRINGE (ML) INJECTION EVERY 12 HOURS SCHEDULED
Status: DISCONTINUED | OUTPATIENT
Start: 2020-02-26 | End: 2020-02-27 | Stop reason: HOSPADM

## 2020-02-26 RX ORDER — ACETAMINOPHEN 325 MG/1
650 TABLET ORAL EVERY 4 HOURS PRN
Status: DISCONTINUED | OUTPATIENT
Start: 2020-02-26 | End: 2020-02-27 | Stop reason: HOSPADM

## 2020-02-26 RX ORDER — DEXTROSE MONOHYDRATE 50 MG/ML
100 INJECTION, SOLUTION INTRAVENOUS PRN
Status: DISCONTINUED | OUTPATIENT
Start: 2020-02-26 | End: 2020-02-27 | Stop reason: HOSPADM

## 2020-02-26 RX ORDER — HALOPERIDOL 5 MG
5 TABLET ORAL DAILY
Status: DISCONTINUED | OUTPATIENT
Start: 2020-02-26 | End: 2020-02-27 | Stop reason: HOSPADM

## 2020-02-26 RX ORDER — LISINOPRIL 10 MG/1
10 TABLET ORAL DAILY
Status: DISCONTINUED | OUTPATIENT
Start: 2020-02-26 | End: 2020-02-27 | Stop reason: HOSPADM

## 2020-02-26 RX ORDER — SERTRALINE HYDROCHLORIDE 100 MG/1
200 TABLET, FILM COATED ORAL DAILY
Status: DISCONTINUED | OUTPATIENT
Start: 2020-02-26 | End: 2020-02-27 | Stop reason: HOSPADM

## 2020-02-26 RX ORDER — SODIUM CHLORIDE 0.9 % (FLUSH) 0.9 %
10 SYRINGE (ML) INJECTION PRN
Status: DISCONTINUED | OUTPATIENT
Start: 2020-02-26 | End: 2020-02-27 | Stop reason: HOSPADM

## 2020-02-26 RX ORDER — DEXTROSE MONOHYDRATE 25 G/50ML
12.5 INJECTION, SOLUTION INTRAVENOUS PRN
Status: DISCONTINUED | OUTPATIENT
Start: 2020-02-26 | End: 2020-02-27 | Stop reason: HOSPADM

## 2020-02-26 RX ORDER — HYDRALAZINE HYDROCHLORIDE 20 MG/ML
10 INJECTION INTRAMUSCULAR; INTRAVENOUS EVERY 10 MIN PRN
Status: DISCONTINUED | OUTPATIENT
Start: 2020-02-26 | End: 2020-02-27 | Stop reason: HOSPADM

## 2020-02-26 RX ORDER — FENTANYL CITRATE 50 UG/ML
50 INJECTION, SOLUTION INTRAMUSCULAR; INTRAVENOUS
Status: ACTIVE | OUTPATIENT
Start: 2020-02-26 | End: 2020-02-26

## 2020-02-26 RX ORDER — GABAPENTIN 300 MG/1
600 CAPSULE ORAL DAILY
Status: DISCONTINUED | OUTPATIENT
Start: 2020-02-26 | End: 2020-02-27 | Stop reason: HOSPADM

## 2020-02-26 RX ORDER — MECLIZINE HYDROCHLORIDE 25 MG/1
25 TABLET ORAL 3 TIMES DAILY
Status: DISCONTINUED | OUTPATIENT
Start: 2020-02-26 | End: 2020-02-27 | Stop reason: HOSPADM

## 2020-02-26 RX ORDER — SODIUM CHLORIDE 9 MG/ML
INJECTION, SOLUTION INTRAVENOUS CONTINUOUS
Status: DISCONTINUED | OUTPATIENT
Start: 2020-02-26 | End: 2020-02-27 | Stop reason: HOSPADM

## 2020-02-26 RX ORDER — HYDROXYZINE PAMOATE 50 MG/1
50 CAPSULE ORAL 3 TIMES DAILY PRN
Status: DISCONTINUED | OUTPATIENT
Start: 2020-02-26 | End: 2020-02-27 | Stop reason: HOSPADM

## 2020-02-26 RX ORDER — NICOTINE POLACRILEX 4 MG
15 LOZENGE BUCCAL PRN
Status: DISCONTINUED | OUTPATIENT
Start: 2020-02-26 | End: 2020-02-27 | Stop reason: HOSPADM

## 2020-02-26 RX ORDER — FOLIC ACID 1 MG/1
1 TABLET ORAL DAILY
Status: DISCONTINUED | OUTPATIENT
Start: 2020-02-26 | End: 2020-02-27 | Stop reason: HOSPADM

## 2020-02-26 RX ORDER — IODIXANOL 320 MG/ML
100 INJECTION, SOLUTION INTRAVASCULAR
Status: COMPLETED | OUTPATIENT
Start: 2020-02-26 | End: 2020-02-26

## 2020-02-26 RX ORDER — ACETYLCYSTEINE 200 MG/ML
600 SOLUTION ORAL; RESPIRATORY (INHALATION) EVERY 6 HOURS
Status: COMPLETED | OUTPATIENT
Start: 2020-02-26 | End: 2020-02-26

## 2020-02-26 RX ORDER — ATORVASTATIN CALCIUM 40 MG/1
40 TABLET, FILM COATED ORAL DAILY
Status: DISCONTINUED | OUTPATIENT
Start: 2020-02-26 | End: 2020-02-27 | Stop reason: HOSPADM

## 2020-02-26 RX ORDER — GABAPENTIN 400 MG/1
400 CAPSULE ORAL 2 TIMES DAILY
Status: ON HOLD | COMMUNITY
End: 2021-01-01

## 2020-02-26 RX ORDER — CHOLECALCIFEROL (VITAMIN D3) 125 MCG
1000 CAPSULE ORAL DAILY
Status: DISCONTINUED | OUTPATIENT
Start: 2020-02-26 | End: 2020-02-27 | Stop reason: HOSPADM

## 2020-02-26 RX ORDER — METOPROLOL TARTRATE 50 MG/1
50 TABLET, FILM COATED ORAL DAILY
Status: DISCONTINUED | OUTPATIENT
Start: 2020-02-26 | End: 2020-02-27 | Stop reason: HOSPADM

## 2020-02-26 RX ORDER — PROTAMINE SULFATE 10 MG/ML
10 INJECTION, SOLUTION INTRAVENOUS ONCE
Status: COMPLETED | OUTPATIENT
Start: 2020-02-26 | End: 2020-02-26

## 2020-02-26 RX ORDER — INSULIN GLARGINE 100 [IU]/ML
35 INJECTION, SOLUTION SUBCUTANEOUS NIGHTLY
Status: DISCONTINUED | OUTPATIENT
Start: 2020-02-26 | End: 2020-02-27 | Stop reason: HOSPADM

## 2020-02-26 RX ADMIN — PROTAMINE SULFATE 10 MG: 10 INJECTION, SOLUTION INTRAVENOUS at 18:01

## 2020-02-26 RX ADMIN — GABAPENTIN 600 MG: 300 CAPSULE ORAL at 08:00

## 2020-02-26 RX ADMIN — SODIUM CHLORIDE: 9 INJECTION, SOLUTION INTRAVENOUS at 14:15

## 2020-02-26 RX ADMIN — ASPIRIN 81 MG CHEWABLE TABLET 81 MG: 81 TABLET CHEWABLE at 08:00

## 2020-02-26 RX ADMIN — METOPROLOL TARTRATE 50 MG: 50 TABLET, FILM COATED ORAL at 08:00

## 2020-02-26 RX ADMIN — ACETAMINOPHEN 650 MG: 325 TABLET ORAL at 21:04

## 2020-02-26 RX ADMIN — IODIXANOL 48 ML: 320 INJECTION, SOLUTION INTRAVASCULAR at 17:24

## 2020-02-26 RX ADMIN — Medication: at 15:12

## 2020-02-26 RX ADMIN — HALOPERIDOL 5 MG: 5 TABLET ORAL at 08:00

## 2020-02-26 RX ADMIN — ACETYLCYSTEINE 600 MG: 200 SOLUTION ORAL; RESPIRATORY (INHALATION) at 15:11

## 2020-02-26 RX ADMIN — MECLIZINE HYDROCHLORIDE 25 MG: 25 TABLET ORAL at 21:04

## 2020-02-26 RX ADMIN — INSULIN GLARGINE 35 UNITS: 100 INJECTION, SOLUTION SUBCUTANEOUS at 21:59

## 2020-02-26 RX ADMIN — ACETYLCYSTEINE 600 MG: 200 SOLUTION ORAL; RESPIRATORY (INHALATION) at 21:03

## 2020-02-26 RX ADMIN — CLOPIDOGREL BISULFATE 75 MG: 75 TABLET, FILM COATED ORAL at 08:00

## 2020-02-26 RX ADMIN — ATORVASTATIN CALCIUM 40 MG: 40 TABLET, FILM COATED ORAL at 08:00

## 2020-02-26 RX ADMIN — SERTRALINE 200 MG: 100 TABLET, FILM COATED ORAL at 08:00

## 2020-02-26 RX ADMIN — LEVOTHYROXINE SODIUM 50 MCG: 0.05 TABLET ORAL at 08:00

## 2020-02-26 RX ADMIN — LISINOPRIL 10 MG: 10 TABLET ORAL at 08:00

## 2020-02-26 ASSESSMENT — PAIN SCALES - GENERAL
PAINLEVEL_OUTOF10: 5
PAINLEVEL_OUTOF10: 2

## 2020-02-26 NOTE — PROGRESS NOTES
Received into Pre and Post Cath Lab. Does not speak Candace Lares . Admission completed through interpeter. No complaints offered.

## 2020-02-26 NOTE — OP NOTE
Lyn Mars  60 [place].o. [facility] [place]    Pre-Op Diagnosis:  1. 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 2.  Left superficial femoral artery stenosis  3. Left popliteal, tibial-peroneal trunk, and peroneal artery occlusion. Post-Op Diagnosis:  Same    Procedure:  1) Aortogram with left lower extremity runoff  2) Left superficial femoral and popliteal artery atherectomy  3) Left tibial-peroneal trunk atherectomy  4) Left peroneal atherectomy  5) Left superficial femoral and popliteal intravascular ultrasound  6) Left tibial-peroneal trunk intravascular ultrasound  7) Left peroneal artery intravascular ultrasound    Surgeon:  Varun Marte MD    Anesthesia:  Conscious Sedation. Sedation time: 80 minutes    EBL: Minimal    Complications: None    Condition: Fair    Indications for procedure:  Patient is a 60year old female with history of left foot tissue loss, rest pain, and pvd. The risks and benefits of endovascular revascularization were discussed with the patient, and she has agree to consent to the procedure. Risks including but note limited to bleeding, infection, pain, limb loss, MI, and death were discussed with the patient. Description of procedure:  After informed consent was obtained from the patient, the patient was taken to the angiography suite and prepped and draped in the usual sterile fashion. Access was gained to the right common femoral artery under ultrasound guidance with the micropuncture needle. A omni flush catheter was place into the abdominal aorta. Aortogram was performed with showed a patent abdominal aorta and bilateral common and external iliac arteries. The left common femoral artery was selected. Left leg angiogram was performed. This showed a patent common femoral artery and profunda femoral artery. There was stenosis of the superficial femoral artery >80%.   The popliteal artery was occluded as was the posterior tibial, tibial peroneal trunk, peroneal artery and anterior tibial arteries. The patient was heparinized. A 6 fr sheath was advance into the superficial femoral artery. The occlusions were then crossed to gain access to the peroneal artery that reconstituted in the mid calf. A 3mm spider filter wire was placed in the peroneal artery. Intravascular ultrasound was performed on the superficial femoral artery, popliteal, peroneal and tibial-peroneal trunk to confirm the degree to stenosis of the superficial femoral artery and the extent of disease in the popliteal, tibial-peroneal trunk, and peroneal artery and size the vessels for treatment. The popliteal, tibial-peroneal trunk, and peroneal arteries were pre-dilated with a 3-3.5 tapered 210 mm gato cross balloon. The superficial femoral, popliteal, tibial-peroneal trunk, and peroneal arteries were treated with atherectomy with a 6 fr small vessel device. The superficial femoral and popliteal arteries were post-dilated with a 5 mm x 150 mm pacific balloon and the tibial-peroneal trunk and peroneal arteries with post dilated with the 3-3.5 tapered 210 mm gato cross balloon with good angiographic results with no hemodynamically significant residual stenosis. The spider filter was retrieved. There was inline flow to the foot via and peroneal artery. The anterior tibial artery also reconstituted to supply flow to the foot. A 6 fr sheath was left at the access site. The patient tolerated the procedure well and was sent to recovery in fair condition. Hemostasis will be achieved with manual compression after the ACT has normalized.

## 2020-02-26 NOTE — PROGRESS NOTES
Returned form [employer]. I alert and awake. No complaints of pain at present. No bleeding or hematoma noted to right femoral site.  at bedside.

## 2020-02-26 NOTE — PROGRESS NOTES
Arrived to pre/post from home with family and patient is Estonian speaking only.   Name and date of birth along with consents and allergies along with medications all verified by use of the ipad and live  49.5

## 2020-02-26 NOTE — PLAN OF CARE
Problem: Wound:  Goal: Will show signs of wound healing; wound closure and no evidence of infection  Description  Will show signs of wound healing; wound closure and no evidence of infection  Outcome: Ongoing     Problem: Wound:  Goal: Will show signs of wound healing; wound closure and no evidence of infection  Description  Will show signs of wound healing; wound closure and no evidence of infection  Outcome: Ongoing     Problem: Physical Regulation:  Goal: Tolerate HBO therapy and barotrauma will be prevented  Description  Tolerate HBO therapy and barotrauma will be prevented  Outcome: Ongoing

## 2020-02-26 NOTE — PROGRESS NOTES
Brady Mcneal is a 60 y.o. female  did successfully complete today's hyperbaric oxygen treatment at [address]. In my clinical judgement, ongoing HBO therapy is  necessary at this time, given a threat to patient function, limb or life from the current condition. Supervision and attendance of Hyperbaric Oxygen Therapy provided. Continue HBO treatment as outlined in the treatment plan. Hyperbaric Oxygen: Ace Crease tolerated Treatment Number: 55 well today without complications. Discharge Instructions were explained and given to Ms.  Theo Gus     Electronically signed by Rey Zuñiga MD on 2/19/2020 at 8:46 AM

## 2020-02-27 VITALS
OXYGEN SATURATION: 97 % | HEART RATE: 86 BPM | BODY MASS INDEX: 41.52 KG/M2 | SYSTOLIC BLOOD PRESSURE: 130 MMHG | RESPIRATION RATE: 18 BRPM | DIASTOLIC BLOOD PRESSURE: 95 MMHG | TEMPERATURE: 97.7 F | WEIGHT: 243.2 LBS | HEIGHT: 64 IN

## 2020-02-27 PROBLEM — I73.9 PAD (PERIPHERAL ARTERY DISEASE) (HCC): Status: ACTIVE | Noted: 2020-02-27

## 2020-02-27 LAB
GLUCOSE BLD-MCNC: 223 MG/DL (ref 60–115)
GLUCOSE BLD-MCNC: 233 MG/DL (ref 60–115)
PERFORMED ON: ABNORMAL
PERFORMED ON: ABNORMAL

## 2020-02-27 PROCEDURE — 2580000003 HC RX 258: Performed by: SURGERY

## 2020-02-27 PROCEDURE — G0378 HOSPITAL OBSERVATION PER HR: HCPCS

## 2020-02-27 PROCEDURE — 6370000000 HC RX 637 (ALT 250 FOR IP): Performed by: SURGERY

## 2020-02-27 RX ADMIN — LISINOPRIL 10 MG: 10 TABLET ORAL at 09:01

## 2020-02-27 RX ADMIN — METOPROLOL TARTRATE 50 MG: 50 TABLET, FILM COATED ORAL at 09:01

## 2020-02-27 RX ADMIN — Medication 10 ML: at 09:02

## 2020-02-27 RX ADMIN — MECLIZINE HYDROCHLORIDE 25 MG: 25 TABLET ORAL at 09:01

## 2020-02-27 RX ADMIN — Medication 10 ML: at 09:06

## 2020-02-27 RX ADMIN — FOLIC ACID 1 MG: 1 TABLET ORAL at 09:01

## 2020-02-27 RX ADMIN — HALOPERIDOL 5 MG: 5 TABLET ORAL at 09:01

## 2020-02-27 RX ADMIN — SERTRALINE 200 MG: 100 TABLET, FILM COATED ORAL at 09:00

## 2020-02-27 RX ADMIN — LEVOTHYROXINE SODIUM 50 MCG: 0.05 TABLET ORAL at 06:26

## 2020-02-27 RX ADMIN — GABAPENTIN 600 MG: 300 CAPSULE ORAL at 09:01

## 2020-02-27 RX ADMIN — ATORVASTATIN CALCIUM 40 MG: 40 TABLET, FILM COATED ORAL at 09:01

## 2020-02-27 RX ADMIN — ASPIRIN 81 MG CHEWABLE TABLET 81 MG: 81 TABLET CHEWABLE at 09:01

## 2020-02-27 RX ADMIN — CYANOCOBALAMIN TAB 500 MCG 1000 MCG: 500 TAB at 09:01

## 2020-02-27 RX ADMIN — VITAMIN D, TAB 1000IU (100/BT) 2000 UNITS: 25 TAB at 09:01

## 2020-02-27 RX ADMIN — CLOPIDOGREL BISULFATE 75 MG: 75 TABLET, FILM COATED ORAL at 09:01

## 2020-02-27 ASSESSMENT — PAIN SCALES - GENERAL: PAINLEVEL_OUTOF10: 0

## 2020-02-27 NOTE — DISCHARGE INSTR - ACTIVITY
NO strenuous activity until your doctor says. Avoid bending at the waist and going up stairs. No lifting anything heavier than 5 pounds for three days.

## 2020-02-27 NOTE — CONSULTS
Medication Sig Start Date End Date Taking? Authorizing Provider   metoprolol tartrate (LOPRESSOR) 50 MG tablet Take 50 mg by mouth daily   Yes Historical Provider, MD   gabapentin (NEURONTIN) 600 MG tablet Take 600 mg by mouth daily. Yes Historical Provider, MD   sertraline (ZOLOFT) 50 MG tablet Take 4 tablets by mouth daily 2/12/20  Yes Maru Nesbitt DO   insulin glargine (LANTUS) 100 UNIT/ML injection vial Inject 35 Units into the skin nightly 2/12/20  Yes Maru Nesbitt DO   insulin lispro (HUMALOG) 100 UNIT/ML injection vial Inject 10 Units into the skin 3 times daily (before meals)  2/12/20  Yes Maru Nesbitt DO   hydrOXYzine (VISTARIL) 50 MG capsule Take 50 mg by mouth 3 times daily as needed for Itching    Yes Historical Provider, MD   haloperidol (HALDOL) 5 MG tablet Take 5 mg by mouth daily   Yes Historical Provider, MD   FreeStyle Lancets MISC 1 each by Does not apply route daily 1/30/20  Yes Nick Goodell, MD   blood glucose test strips (FREESTYLE LITE) strip 1 each by In Vitro route daily As needed.  1/30/20  Yes Nick Goodell, MD   Alcohol Swabs (ALCOHOL PREP) 70 % PADS qid  Patient taking differently: Apply 1 each topically 4 times daily qid 1/30/20  Yes Nick Goodell, MD   Insulin Pen Needle (NOVOFINE) 32G X 6 MM MISC Qid  Patient taking differently: 1 each 3 times daily Qid 1/30/20  Yes Nick Goodell, MD   atorvastatin (LIPITOR) 40 MG tablet Take 40 mg by mouth daily   Yes Historical Provider, MD   aspirin 81 MG chewable tablet Take 81 mg by mouth daily   Yes Historical Provider, MD   folic acid (FOLVITE) 1 MG tablet Take 1 mg by mouth daily   Yes Historical Provider, MD   Iron Polysacch Neozf-A05-MH (NIFEREX-150 FORTE PO) Take 1 tablet by mouth daily    Yes Historical Provider, MD   clopidogrel (PLAVIX) 75 MG tablet Take 75 mg by mouth daily   Yes Historical Provider, MD   Cyanocobalamin (VITAMIN B-12) 1000 MCG extended release tablet Take 1,000 mcg by mouth daily   Yes Historical Provider, MD Clioquinol     Cogentin [Benztropine]     Depakote [Divalproex Sodium]     Effexor Xr [Venlafaxine Hcl Er]     Geodon [Ziprasidone Hcl]     Lyrica [Pregabalin]     Navane [Thiothixene]     Pamelor [Nortriptyline Hcl]     Remeron [Mirtazapine]     Risperdal [Risperidone]     Trazodone And Nefazodone     Wellbutrin [Bupropion]        Social History     Socioeconomic History    Marital status:      Spouse name: Not on file    Number of children: Not on file    Years of education: Not on file    Highest education level: Not on file   Occupational History    Not on file   Social Needs    Financial resource strain: Not on file    Food insecurity:     Worry: Not on file     Inability: Not on file    Transportation needs:     Medical: Not on file     Non-medical: Not on file   Tobacco Use    Smoking status: Never Smoker    Smokeless tobacco: Never Used   Substance and Sexual Activity    Alcohol use: Never     Frequency: Never    Drug use: Never    Sexual activity: Not on file   Lifestyle    Physical activity:     Days per week: Not on file     Minutes per session: Not on file    Stress: Not on file   Relationships    Social connections:     Talks on phone: Not on file     Gets together: Not on file     Attends L[employer]-day service: Not on file     Active member of club or organization: Not on file     Attends meetings of clubs or organizations: Not on file     Relationship status: Not on file    Intimate partner violence:     Fear of current or ex partner: Not on file     Emotionally abused: Not on file     Physically abused: Not on file     Forced sexual activity: Not on file   Other Topics Concern    Not on file   Social History Narrative    Not on file       No family history on file. Review Of Systems:   Review of systems not obtained due to patient factors - mental status  ROS as noted in HPI, 12 point ROS reviewed and otherwise negative.   Physical Exam:  Vitals:    02/26/20 1900 however feel free to reconsult if needed.        Electronically signed by ESDRAS Vaughn - CNP on 2/27/20 at 2:08 AM    Dr. Berta Ruvalcaba MD - supervising physician

## 2020-02-27 NOTE — PROGRESS NOTES
Hemostasis achieved at 6:55pm.  No bleeding or hematoma noted at right radial site. Dressing applied. Manual pressure held for 20 mins.

## 2020-03-02 ENCOUNTER — HOSPITAL ENCOUNTER (OUTPATIENT)
Dept: HYPERBARIC MEDICINE | Age: 65
Discharge: HOME | DRG: 197 | End: 2020-03-02
Payer: COMMERCIAL

## 2020-03-02 VITALS
SYSTOLIC BLOOD PRESSURE: 144 MMHG | TEMPERATURE: 96.8 F | DIASTOLIC BLOOD PRESSURE: 83 MMHG | RESPIRATION RATE: 18 BRPM | HEART RATE: 58 BPM

## 2020-03-02 LAB
GLUCOSE BLD-MCNC: 247 MG/DL (ref 60–115)
GLUCOSE BLD-MCNC: 307 MG/DL (ref 60–115)
PERFORMED ON: ABNORMAL
PERFORMED ON: ABNORMAL

## 2020-03-02 PROCEDURE — 99183 HYPERBARIC OXYGEN THERAPY: CPT | Performed by: NURSE PRACTITIONER

## 2020-03-02 PROCEDURE — G0277 HBOT, FULL BODY CHAMBER, 30M: HCPCS

## 2020-03-02 NOTE — PROGRESS NOTES
Hyperbaric Oxygen Therapy   Progress Note    NAME: Garth Mota RECORD NUMBER:  [id number]  AGE: 60 y.o. GENDER: female  : 1955  EPISODE DATE:  3/2/2020   Subjective   HBO Treatment Number: 48 out of Total Treatments: 60  HBO Diagnosis:   Digna Siegel: Digna Reyes  right lower extremity non-healing diabetic ulcer s/p amputation   Safety checks performed prior to treatment. See doc flowsheets for documentation. Objective        Recent Labs     20  1121 20  1329   POCGLU 307* 247*     Pre treatment Vital Signs       Temp: 97.6 °F (36.4 °C)     Pulse: 57     Resp: 18     BP: (!) 128/49     Post treatment Vital Signs  Temp: 96.8 °F (36 °C)  Pulse: 58  Resp: 18  BP: (!) 144/83  Assessment      Physical Exam:  General Appearance:  alert and oriented to person, place and time, well-developed and well-nourished, in no acute distress  ENT:  tympanic membranes intact bilaterally, normal color with pre and post bilateral TEED scores normal  Pulmonary/Chest:  clear to auscultation bilaterally- no wheezes, rales or rhonchi, normal air movement, no respiratory distress  Cardiovascular:  regular rate and rhythm, no murmurs rubs or gallops  Chamber #: [id number]  Treatment Start Time: 1130     Pressure Reached Time: 1145  DOROTHY : 2.5  Number of Air Breaks:  Treatment Status: No Air break     Decompression Time: 1310   Treatment End Time: 1325  Length of Treatment: 90 Minutes  Symptoms Noted During Treatment: None    Adverse Event: no    I was present on these premises and immediately available to furnish assistance & direction throughout the procedure. Brenda      Sky Franz is a 60 y.o. female  did successfully complete today's hyperbaric oxygen treatment at [address].  Griselda Aguirre reports patient complains of pain in left foot. 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  Recently underwent vascular studies/revascularization of left extremity. Examined and noted distal and medial tip of left 5th toe is necrotic with distal tip of 4th toe necrotic. Foot is dark red/diamond in color, warm to touch and equal in temp to that of the right foot. Pulses in left foot not palpable, able to auscultate with doppler both pedal and post tibial pulses. Re-dressed with DSD. No symptoms of infection with no localized areas of excess warmth, or erythema noted, patient denies resting/dependent pain. Dr. Cee Ignacio advised of husbands report of increased pain and concern regarding new area of necrosis.  and patient encouraged to keep appointment this week with Dr. Cee Ignacio and to begin newly added medication of Lyrica.  reports does not yet have the Lyrica but will try to get it today. In my clinical judgement, ongoing HBO therapy is  necessary at this time, given a threat to patient function, limb or life from the current condition. Supervision and attendance of Hyperbaric Oxygen Therapy provided. Continue HBO treatment as outlined in the treatment plan. Hyperbaric Oxygen: Tye Giles tolerated Treatment Number: 48 well today without complications. Discharge Instructions were explained and given to MsGómez  Mark Derek     Electronically signed by ESDRAS Pak NP on 3/2/2020 at 2:38 PM

## 2020-03-04 ENCOUNTER — APPOINTMENT (OUTPATIENT)
Dept: GENERAL RADIOLOGY | Age: 65
DRG: 197 | End: 2020-03-04
Payer: COMMERCIAL

## 2020-03-04 ENCOUNTER — HOSPITAL ENCOUNTER (OUTPATIENT)
Dept: WOUND CARE | Age: 65
Discharge: HOME | End: 2020-03-04
Payer: COMMERCIAL

## 2020-03-04 ENCOUNTER — APPOINTMENT (OUTPATIENT)
Dept: ULTRASOUND IMAGING | Age: 65
DRG: 197 | End: 2020-03-04
Payer: COMMERCIAL

## 2020-03-04 ENCOUNTER — HOSPITAL ENCOUNTER (INPATIENT)
Age: 65
LOS: 6 days | Discharge: HOME HEALTH | DRG: 197 | End: 2020-03-10
Attending: NEUROMUSCULOSKELETAL MEDICINE, SPORTS MEDICINE | Admitting: INTERNAL MEDICINE
Payer: COMMERCIAL

## 2020-03-04 PROBLEM — L03.90 CELLULITIS: Status: ACTIVE | Noted: 2020-03-04

## 2020-03-04 LAB
ALBUMIN SERPL-MCNC: 3.4 G/DL (ref 3.5–4.6)
ALP BLD-CCNC: 127 U/L (ref 40–130)
ALT SERPL-CCNC: 29 U/L (ref 0–33)
ANION GAP SERPL CALCULATED.3IONS-SCNC: 15 MEQ/L (ref 9–15)
AST SERPL-CCNC: 19 U/L (ref 0–35)
BASOPHILS ABSOLUTE: 0.1 K/UL (ref 0–0.2)
BASOPHILS RELATIVE PERCENT: 0.9 %
BILIRUB SERPL-MCNC: 0.4 MG/DL (ref 0.2–0.7)
BUN BLDV-MCNC: 28 MG/DL (ref 8–23)
CALCIUM SERPL-MCNC: 9.3 MG/DL (ref 8.5–9.9)
CHLORIDE BLD-SCNC: 101 MEQ/L (ref 95–107)
CO2: 23 MEQ/L (ref 20–31)
CREAT SERPL-MCNC: 1.28 MG/DL (ref 0.5–0.9)
EOSINOPHILS ABSOLUTE: 0.3 K/UL (ref 0–0.7)
EOSINOPHILS RELATIVE PERCENT: 2.7 %
GFR AFRICAN AMERICAN: 51.1
GFR NON-AFRICAN AMERICAN: 42.2
GLOBULIN: 3.7 G/DL (ref 2.3–3.5)
GLUCOSE BLD-MCNC: 206 MG/DL (ref 60–115)
GLUCOSE BLD-MCNC: 271 MG/DL (ref 70–99)
HCT VFR BLD CALC: 37.2 % (ref 37–47)
HEMOGLOBIN: 12.3 G/DL (ref 12–16)
LYMPHOCYTES ABSOLUTE: 2.2 K/UL (ref 1–4.8)
LYMPHOCYTES RELATIVE PERCENT: 22.8 %
MCH RBC QN AUTO: 24.7 PG (ref 27–31.3)
MCHC RBC AUTO-ENTMCNC: 32.9 % (ref 33–37)
MCV RBC AUTO: 75 FL (ref 82–100)
MONOCYTES ABSOLUTE: 0.6 K/UL (ref 0.2–0.8)
MONOCYTES RELATIVE PERCENT: 6.4 %
NEUTROPHILS ABSOLUTE: 6.5 K/UL (ref 1.4–6.5)
NEUTROPHILS RELATIVE PERCENT: 67.2 %
PDW BLD-RTO: 17.8 % (ref 11.5–14.5)
PERFORMED ON: ABNORMAL
PLATELET # BLD: 262 K/UL (ref 130–400)
POTASSIUM SERPL-SCNC: 5.1 MEQ/L (ref 3.4–4.9)
RBC # BLD: 4.96 M/UL (ref 4.2–5.4)
SODIUM BLD-SCNC: 139 MEQ/L (ref 135–144)
TOTAL PROTEIN: 7.1 G/DL (ref 6.3–8)
WBC # BLD: 9.7 K/UL (ref 4.8–10.8)

## 2020-03-04 PROCEDURE — 2580000003 HC RX 258: Performed by: NEUROMUSCULOSKELETAL MEDICINE, SPORTS MEDICINE

## 2020-03-04 PROCEDURE — 1210000000 HC MED SURG R&B

## 2020-03-04 PROCEDURE — 99284 EMERGENCY DEPT VISIT MOD MDM: CPT

## 2020-03-04 PROCEDURE — 6370000000 HC RX 637 (ALT 250 FOR IP): Performed by: INTERNAL MEDICINE

## 2020-03-04 PROCEDURE — 93926 LOWER EXTREMITY STUDY: CPT

## 2020-03-04 PROCEDURE — 80053 COMPREHEN METABOLIC PANEL: CPT

## 2020-03-04 PROCEDURE — 87040 BLOOD CULTURE FOR BACTERIA: CPT

## 2020-03-04 PROCEDURE — 36415 COLL VENOUS BLD VENIPUNCTURE: CPT

## 2020-03-04 PROCEDURE — 6370000000 HC RX 637 (ALT 250 FOR IP): Performed by: NEUROMUSCULOSKELETAL MEDICINE, SPORTS MEDICINE

## 2020-03-04 PROCEDURE — 6360000002 HC RX W HCPCS: Performed by: NEUROMUSCULOSKELETAL MEDICINE, SPORTS MEDICINE

## 2020-03-04 PROCEDURE — 73630 X-RAY EXAM OF FOOT: CPT

## 2020-03-04 PROCEDURE — 85025 COMPLETE CBC W/AUTO DIFF WBC: CPT

## 2020-03-04 RX ORDER — ASPIRIN 81 MG/1
81 TABLET, CHEWABLE ORAL DAILY
Status: DISCONTINUED | OUTPATIENT
Start: 2020-03-04 | End: 2020-03-10 | Stop reason: HOSPADM

## 2020-03-04 RX ORDER — NICOTINE POLACRILEX 4 MG
15 LOZENGE BUCCAL PRN
Status: DISCONTINUED | OUTPATIENT
Start: 2020-03-04 | End: 2020-03-10 | Stop reason: HOSPADM

## 2020-03-04 RX ORDER — SODIUM CHLORIDE 0.9 % (FLUSH) 0.9 %
10 SYRINGE (ML) INJECTION PRN
Status: DISCONTINUED | OUTPATIENT
Start: 2020-03-04 | End: 2020-03-10 | Stop reason: HOSPADM

## 2020-03-04 RX ORDER — DEXTROSE MONOHYDRATE 50 MG/ML
100 INJECTION, SOLUTION INTRAVENOUS PRN
Status: DISCONTINUED | OUTPATIENT
Start: 2020-03-04 | End: 2020-03-10 | Stop reason: HOSPADM

## 2020-03-04 RX ORDER — SERTRALINE HYDROCHLORIDE 100 MG/1
200 TABLET, FILM COATED ORAL DAILY
Status: DISCONTINUED | OUTPATIENT
Start: 2020-03-04 | End: 2020-03-10 | Stop reason: HOSPADM

## 2020-03-04 RX ORDER — DEXTROSE MONOHYDRATE 25 G/50ML
12.5 INJECTION, SOLUTION INTRAVENOUS PRN
Status: DISCONTINUED | OUTPATIENT
Start: 2020-03-04 | End: 2020-03-10 | Stop reason: HOSPADM

## 2020-03-04 RX ORDER — SODIUM CHLORIDE 0.9 % (FLUSH) 0.9 %
10 SYRINGE (ML) INJECTION EVERY 12 HOURS SCHEDULED
Status: DISCONTINUED | OUTPATIENT
Start: 2020-03-04 | End: 2020-03-10 | Stop reason: HOSPADM

## 2020-03-04 RX ORDER — GABAPENTIN 300 MG/1
600 CAPSULE ORAL DAILY
Status: DISCONTINUED | OUTPATIENT
Start: 2020-03-04 | End: 2020-03-10 | Stop reason: HOSPADM

## 2020-03-04 RX ORDER — ATORVASTATIN CALCIUM 40 MG/1
40 TABLET, FILM COATED ORAL NIGHTLY
Status: DISCONTINUED | OUTPATIENT
Start: 2020-03-04 | End: 2020-03-10 | Stop reason: HOSPADM

## 2020-03-04 RX ORDER — FOLIC ACID 1 MG/1
1 TABLET ORAL DAILY
Status: DISCONTINUED | OUTPATIENT
Start: 2020-03-04 | End: 2020-03-10 | Stop reason: HOSPADM

## 2020-03-04 RX ORDER — SODIUM CHLORIDE 9 MG/ML
INJECTION, SOLUTION INTRAVENOUS CONTINUOUS
Status: DISCONTINUED | OUTPATIENT
Start: 2020-03-04 | End: 2020-03-06

## 2020-03-04 RX ORDER — INSULIN GLARGINE 100 [IU]/ML
35 INJECTION, SOLUTION SUBCUTANEOUS NIGHTLY
Status: DISCONTINUED | OUTPATIENT
Start: 2020-03-04 | End: 2020-03-10 | Stop reason: HOSPADM

## 2020-03-04 RX ORDER — 0.9 % SODIUM CHLORIDE 0.9 %
500 INTRAVENOUS SOLUTION INTRAVENOUS ONCE
Status: COMPLETED | OUTPATIENT
Start: 2020-03-04 | End: 2020-03-04

## 2020-03-04 RX ORDER — POLYETHYLENE GLYCOL 3350 17 G/17G
17 POWDER, FOR SOLUTION ORAL DAILY PRN
Status: DISCONTINUED | OUTPATIENT
Start: 2020-03-04 | End: 2020-03-10 | Stop reason: HOSPADM

## 2020-03-04 RX ORDER — ALPRAZOLAM 0.5 MG/1
2 TABLET ORAL NIGHTLY PRN
Status: DISCONTINUED | OUTPATIENT
Start: 2020-03-04 | End: 2020-03-10 | Stop reason: HOSPADM

## 2020-03-04 RX ORDER — HYDROXYZINE PAMOATE 25 MG/1
50 CAPSULE ORAL 3 TIMES DAILY PRN
Status: DISCONTINUED | OUTPATIENT
Start: 2020-03-04 | End: 2020-03-10 | Stop reason: HOSPADM

## 2020-03-04 RX ORDER — ACETAMINOPHEN 325 MG/1
650 TABLET ORAL EVERY 6 HOURS PRN
Status: DISCONTINUED | OUTPATIENT
Start: 2020-03-04 | End: 2020-03-10 | Stop reason: HOSPADM

## 2020-03-04 RX ORDER — ONDANSETRON 2 MG/ML
4 INJECTION INTRAMUSCULAR; INTRAVENOUS EVERY 6 HOURS PRN
Status: DISCONTINUED | OUTPATIENT
Start: 2020-03-04 | End: 2020-03-10 | Stop reason: HOSPADM

## 2020-03-04 RX ORDER — HYDROCODONE BITARTRATE AND ACETAMINOPHEN 5; 325 MG/1; MG/1
1 TABLET ORAL EVERY 4 HOURS PRN
Status: DISCONTINUED | OUTPATIENT
Start: 2020-03-04 | End: 2020-03-10 | Stop reason: HOSPADM

## 2020-03-04 RX ORDER — CLOPIDOGREL BISULFATE 75 MG/1
75 TABLET ORAL DAILY
Status: DISCONTINUED | OUTPATIENT
Start: 2020-03-05 | End: 2020-03-10 | Stop reason: HOSPADM

## 2020-03-04 RX ORDER — ACETAMINOPHEN 650 MG/1
650 SUPPOSITORY RECTAL EVERY 6 HOURS PRN
Status: DISCONTINUED | OUTPATIENT
Start: 2020-03-04 | End: 2020-03-10 | Stop reason: HOSPADM

## 2020-03-04 RX ORDER — ALPRAZOLAM 1 MG/1
2 TABLET ORAL NIGHTLY PRN
Status: ON HOLD | COMMUNITY
End: 2020-09-02

## 2020-03-04 RX ORDER — LEVOTHYROXINE SODIUM 0.05 MG/1
50 TABLET ORAL DAILY
Status: DISCONTINUED | OUTPATIENT
Start: 2020-03-04 | End: 2020-03-10 | Stop reason: HOSPADM

## 2020-03-04 RX ORDER — METOPROLOL TARTRATE 50 MG/1
50 TABLET, FILM COATED ORAL DAILY
Status: DISCONTINUED | OUTPATIENT
Start: 2020-03-04 | End: 2020-03-10 | Stop reason: HOSPADM

## 2020-03-04 RX ORDER — HYDROCODONE BITARTRATE AND ACETAMINOPHEN 5; 325 MG/1; MG/1
1 TABLET ORAL ONCE
Status: COMPLETED | OUTPATIENT
Start: 2020-03-04 | End: 2020-03-04

## 2020-03-04 RX ADMIN — VANCOMYCIN HYDROCHLORIDE 1000 MG: 1 INJECTION, POWDER, LYOPHILIZED, FOR SOLUTION INTRAVENOUS at 16:55

## 2020-03-04 RX ADMIN — HYDROCODONE BITARTRATE AND ACETAMINOPHEN 1 TABLET: 5; 325 TABLET ORAL at 13:52

## 2020-03-04 RX ADMIN — INSULIN HUMAN 4 UNITS: 100 INJECTION, SOLUTION PARENTERAL at 16:45

## 2020-03-04 RX ADMIN — HYDROCODONE BITARTRATE AND ACETAMINOPHEN 1 TABLET: 5; 325 TABLET ORAL at 21:25

## 2020-03-04 RX ADMIN — SODIUM CHLORIDE 500 ML: 9 INJECTION, SOLUTION INTRAVENOUS at 16:45

## 2020-03-04 RX ADMIN — INSULIN GLARGINE 35 UNITS: 100 INJECTION, SOLUTION SUBCUTANEOUS at 21:25

## 2020-03-04 RX ADMIN — HYDROMORPHONE HYDROCHLORIDE 0.5 MG: 1 INJECTION, SOLUTION INTRAMUSCULAR; INTRAVENOUS; SUBCUTANEOUS at 16:45

## 2020-03-04 RX ADMIN — ATORVASTATIN CALCIUM 40 MG: 40 TABLET, FILM COATED ORAL at 21:25

## 2020-03-04 ASSESSMENT — PAIN DESCRIPTION - PAIN TYPE
TYPE: CHRONIC PAIN
TYPE: CHRONIC PAIN
TYPE: CHRONIC PAIN;ACUTE PAIN

## 2020-03-04 ASSESSMENT — PAIN DESCRIPTION - LOCATION
LOCATION: TOE (COMMENT WHICH ONE)
LOCATION: FOOT;TOE (COMMENT WHICH ONE)

## 2020-03-04 ASSESSMENT — PAIN SCALES - GENERAL
PAINLEVEL_OUTOF10: 10
PAINLEVEL_OUTOF10: 10
PAINLEVEL_OUTOF10: 7
PAINLEVEL_OUTOF10: 10

## 2020-03-04 ASSESSMENT — ENCOUNTER SYMPTOMS
SHORTNESS OF BREATH: 0
SINUS PAIN: 0
EYE PAIN: 0
ABDOMINAL PAIN: 0
DIARRHEA: 0
NAUSEA: 0
COUGH: 0
VOMITING: 0
EYE DISCHARGE: 0
WHEEZING: 0
SORE THROAT: 0
EYE REDNESS: 0

## 2020-03-04 ASSESSMENT — PAIN - FUNCTIONAL ASSESSMENT: PAIN_FUNCTIONAL_ASSESSMENT: PREVENTS OR INTERFERES WITH ALL ACTIVE AND SOME PASSIVE ACTIVITIES

## 2020-03-04 ASSESSMENT — PAIN DESCRIPTION - PROGRESSION: CLINICAL_PROGRESSION: NOT CHANGED

## 2020-03-04 ASSESSMENT — PAIN SCALES - WONG BAKER: WONGBAKER_NUMERICALRESPONSE: 10

## 2020-03-04 ASSESSMENT — PAIN DESCRIPTION - ORIENTATION
ORIENTATION: LEFT

## 2020-03-04 ASSESSMENT — PAIN DESCRIPTION - ONSET: ONSET: ON-GOING

## 2020-03-04 ASSESSMENT — PAIN DESCRIPTION - FREQUENCY: FREQUENCY: CONTINUOUS

## 2020-03-04 ASSESSMENT — PAIN DESCRIPTION - DESCRIPTORS: DESCRIPTORS: CONSTANT;CRUSHING;STABBING

## 2020-03-04 NOTE — PROGRESS NOTES
Pt's  Wally who speaks [address] was called this morning by this RN around 11:15 asking if Grace Dominique would be in for her 10am apt, Wally was silent, this nurse expressed to Wally that Dr Rubia Stapleton wanted to see pt asap and that was why we changed her apt from Friday to this morning. David Franklin then voiced to this nurse , \"OK We are coming now\". They never showed for apt as of 1225.

## 2020-03-04 NOTE — ED TRIAGE NOTES
Pt a/ox 3 skin pink w/d . Pt Yoruba speaking only. Pt missed wound clinic appointment today with Dr Latonya Pascual and refused to see NP. Per pt lt toes discolored. exan deferred d/t being in triage.

## 2020-03-04 NOTE — ED PROVIDER NOTES
[address] [facility] ED  eMERGENCYdEPARTMENT eNCOUnter      Pt Name: Yola Hawley  MRN: [id number]  [place] 1955  Date of evaluation: 3/4/2020  Provider:ALDO GOMEZ MD    CHIEF COMPLAINT           HPI  Yola Hawley is a 60 y.o. female per chart review has a h/o diabetic neuropathy with poor circulation of the toes, mainly because of blackened area between the 2 toes with tissue breakdown for which he was apparently being cared for at the wound clinic, sent here for admission, and consultation with podiatry for a possible amputation of the toe. Patient had her right third toe removed about 7 months ago. ROS  Review of Systems   Constitutional: Negative for appetite change, chills, diaphoresis, fatigue and fever. HENT: Negative for congestion, ear pain, sinus pain and sore throat. Eyes: Negative for pain, discharge and redness. Respiratory: Negative for cough, shortness of breath and wheezing. Cardiovascular: Negative for chest pain and palpitations. Gastrointestinal: Negative for abdominal pain, diarrhea, nausea and vomiting. Genitourinary: Negative for dysuria, flank pain and hematuria. Musculoskeletal: Positive for joint swelling. Negative for arthralgias and myalgias. Skin: Negative for rash and wound. Neurological: Negative for dizziness, syncope, light-headedness and headaches. All other systems reviewed and are negative. Except as noted above the remainder of the review of systems was reviewed and negative.        PAST MEDICAL HISTORY     Past Medical History:   Diagnosis Date    Asthma     CAD (coronary artery disease)     CKD (chronic kidney disease) stage 3, GFR 30-59 ml/min ([facility]     Colitis     Diabetes mellitus ([facility]     Hyperlipidemia     Hypertension     PAD (peripheral artery disease) ([facility])     Prolonged emergence from general anesthesia     PVD (peripheral vascular disease) ([facility]     Thyroid disease          SURGICAL HISTORY Past Surgical History:   Procedure Laterality Date    CARDIAC SURGERY       SECTION      COLONOSCOPY N/A 2019    COLONOSCOPY DIAGNOSTIC performed by Latonya Molina MD at [address] GRAFT  2019    unknown vessels    HYSTERECTOMY      TOE AMPUTATION Right     3rd toe         CURRENTMEDICATIONS       Previous Medications    ALCOHOL SWABS (ALCOHOL PREP) 70 % PADS    qid    ASPIRIN 81 MG CHEWABLE TABLET    Take 81 mg by mouth daily    ATORVASTATIN (LIPITOR) 40 MG TABLET    Take 40 mg by mouth daily    BLOOD GLUCOSE TEST STRIPS (FREESTYLE LITE) STRIP    1 each by In Vitro route daily As needed. CHOLECALCIFEROL (VITAMIN D) 2000 UNITS CAPS CAPSULE    Take 2,000 Units by mouth daily     CHOLECALCIFEROL (VITAMIN D3) 19849 UNITS CAPS    Take 1 capsule by mouth once a week Indications: weekly on Sun     CLOPIDOGREL (PLAVIX) 75 MG TABLET    Take 75 mg by mouth daily    CYANOCOBALAMIN (VITAMIN B-12) 1000 MCG EXTENDED RELEASE TABLET    Take 1,000 mcg by mouth daily    FOLIC ACID (FOLVITE) 1 MG TABLET    Take 1 mg by mouth daily    FREESTYLE LANCETS MISC    1 each by Does not apply route daily    FUROSEMIDE (LASIX) 40 MG TABLET    Take 40 mg by mouth Indications: M-W-F     GABAPENTIN (NEURONTIN) 600 MG TABLET    Take 600 mg by mouth daily.     HALOPERIDOL (HALDOL) 5 MG TABLET    Take 5 mg by mouth daily    HYDROXYZINE (VISTARIL) 50 MG CAPSULE    Take 50 mg by mouth 3 times daily as needed for Itching     INSULIN GLARGINE (LANTUS) 100 UNIT/ML INJECTION VIAL    Inject 35 Units into the skin nightly    INSULIN LISPRO (HUMALOG) 100 UNIT/ML INJECTION VIAL    Inject 0-6 Units into the skin 3 times daily (with meals)    INSULIN LISPRO (HUMALOG) 100 UNIT/ML INJECTION VIAL    Inject 10 Units into the skin 3 times daily (before meals)     INSULIN PEN NEEDLE (NOVOFINE) 32G X 6 MM MISC    Qid    IRON POLYSACCH ERHQS-R49-LS (NIFEREX-150 FORTE PO)    Take 1 tablet by mouth daily None   Other Topics Concern    None   Social History Narrative    None         PHYSICAL EXAM       ED Triage Vitals [03/04/20 1305]   BP Temp Temp src Pulse Resp SpO2 Height Weight   (!) 105/55 97.5 °F (36.4 °C) -- 63 18 97 % -- 235 lb (106.6 kg)       Physical Exam  Vitals signs and nursing note reviewed. Constitutional:       General: She is not in acute distress. Appearance: She is normal weight. She is not ill-appearing, toxic-appearing or diaphoretic. HENT:      Head: Normocephalic and atraumatic. Mouth/Throat:      Mouth: Mucous membranes are moist.   Eyes:      Extraocular Movements: Extraocular movements intact. Conjunctiva/sclera: Conjunctivae normal.      Pupils: Pupils are equal, round, and reactive to light. Neck:      Musculoskeletal: Neck supple. Cardiovascular:      Rate and Rhythm: Normal rate and regular rhythm. Pulses: Normal pulses. Heart sounds: Normal heart sounds. Pulmonary:      Effort: Pulmonary effort is normal. No respiratory distress. Breath sounds: Normal breath sounds. Abdominal:      General: Abdomen is flat. Bowel sounds are normal.      Palpations: Abdomen is soft. Musculoskeletal:         General: Swelling present. Comments: Pain with swelling and some tissue breakdown between the fourth and fifth toes with a blackened area. Skin:     General: Skin is warm and dry. Neurological:      General: No focal deficit present. Mental Status: She is alert. MDM  Saline lock and labs drawn. One liter of normal saline infused. Humulin 4 units ivp. Case discussed with Dr. Helga Collins and he has requested admission and consult with podiatry. Case discussed with Dr. Andrei Melchor resident and they will see this patient once they are admitted. Patient is admitted to the hospitalist and is started on vancomycin in the meantime. Results for Pedro Georges (MRN [id number]) as of 3/4/2020 14:39   Ref.  Range 3/4/2020 13:45   Glucose Latest Ref Range: 70 - 99 mg/dL 271 (H)   Calcium Latest Ref Range: 8.5 - 9.9 mg/dL 9.3   Total Protein Latest Ref Range: 6.3 - 8.0 g/dL 7.1   Albumin Latest Ref Range: 3.5 - 4.6 g/dL 3.4 (L)   Globulin Latest Ref Range: 2.3 - 3.5 g/dL 3.7 (H)   Alk Phos Latest Ref Range: 40 - 130 U/L 127   ALT Latest Ref Range: 0 - 33 U/L 29   AST Latest Ref Range: 0 - 35 U/L 19   Bilirubin Latest Ref Range: 0.2 - 0.7 mg/dL 0.4   WBC Latest Ref Range: 4.8 - 10.8 K/uL 9.7   RBC Latest Ref Range: 4.20 - 5.40 M/uL 4.96   Hemoglobin Quant Latest Ref Range: 12.0 - 16.0 g/dL 12.3   Hematocrit Latest Ref Range: 37.0 - 47.0 % 37.2   MCV Latest Ref Range: 82.0 - 100.0 fL 75.0 (L)   MCH Latest Ref Range: 27.0 - 31.3 pg 24.7 (L)   MCHC Latest Ref Range: 33.0 - 37.0 % 32.9 (L)   RDW Latest Ref Range: 11.5 - 14.5 % 17.8 (H)   Platelet Count Latest Ref Range: 130 - 400 K/uL 262   Neutrophils % Latest Units: % 67.2   Lymphocyte % Latest Units: % 22.8   Monocytes % Latest Units: % 6.4   Eosinophils % Latest Units: % 2.7   Basophils % Latest Units: % 0.9   Neutrophils Absolute Latest Ref Range: 1.4 - 6.5 K/uL 6.5   Lymphocytes Absolute Latest Ref Range: 1.0 - 4.8 K/uL 2.2   Monocytes Absolute Latest Ref Range: 0.2 - 0.8 K/uL 0.6   Eosinophils Absolute Latest Ref Range: 0.0 - 0.7 K/uL 0.3   Basophils Absolute Latest Ref Range: 0.0 - 0.2 K/uL 0.1       FINAL IMPRESSION      1.  Gangrene of toe of left foot [facility])          DISPOSITION/PLAN   DISPOSITION Admitted 03/04/2020 03:59:47 PM        DISCHARGE MEDICATIONS:  Barb Griffin MD(electronically signed)  Attending Emergency Physician            Levi Ashford MD  03/04/20 8311

## 2020-03-04 NOTE — PROGRESS NOTES
Dr. Kayli Gupta notified that patient and  refused offer to see patient, stating they will go to the ER. Dr. Kayli Gupta requests ER be notified to have Podiatry see patient in the ER. Emergency room contacted and advised of same.

## 2020-03-04 NOTE — H&P
(NEURONTIN) 600 MG tablet Take 600 mg by mouth daily. Historical Provider, MD   sertraline (ZOLOFT) 50 MG tablet Take 4 tablets by mouth daily 2/12/20   Coni Yeung,    insulin glargine (LANTUS) 100 UNIT/ML injection vial Inject 35 Units into the skin nightly 2/12/20   Coni Calderonrow, DO   insulin lispro (HUMALOG) 100 UNIT/ML injection vial Inject 10 Units into the skin 3 times daily (before meals)  2/12/20   Coni Yeung,    hydrOXYzine (VISTARIL) 50 MG capsule Take 50 mg by mouth 3 times daily as needed for Itching     Historical Provider, MD   haloperidol (HALDOL) 5 MG tablet Take 5 mg by mouth daily    Historical Provider, MD   FreeStyle Lancets MISC 1 each by Does not apply route daily 1/30/20   Marti Davila MD   blood glucose test strips (FREESTYLE LITE) strip 1 each by In Vitro route daily As needed.  1/30/20   Marti Davila MD   Alcohol Swabs (ALCOHOL PREP) 70 % PADS qid  Patient taking differently: Apply 1 each topically 4 times daily qid 1/30/20   Marti Davila MD   Insulin Pen Needle (NOVOFINE) 32G X 6 MM MISC Qid  Patient taking differently: 1 each 3 times daily Qid 1/30/20   Marti Davila MD   atorvastatin (LIPITOR) 40 MG tablet Take 40 mg by mouth daily    Historical Provider, MD   aspirin 81 MG chewable tablet Take 81 mg by mouth daily    Historical Provider, MD   folic acid (FOLVITE) 1 MG tablet Take 1 mg by mouth daily    Historical Provider, MD   Iron Polysacch Vbbta-J48-CJ (NIFEREX-150 FORTE PO) Take 1 tablet by mouth daily     Historical Provider, MD   clopidogrel (PLAVIX) 75 MG tablet Take 75 mg by mouth daily    Historical Provider, MD   Cyanocobalamin (VITAMIN B-12) 1000 MCG extended release tablet Take 1,000 mcg by mouth daily    Historical Provider, MD   Cholecalciferol (VITAMIN D3) 45108 units CAPS Take 1 capsule by mouth once a week Indications: weekly on Sun     Historical Provider, MD   Cholecalciferol (VITAMIN D) 2000 units CAPS capsule Take 2,000 Units by mouth daily Historical Provider, MD   lisinopril (PRINIVIL;ZESTRIL) 10 MG tablet Take 10 mg by mouth daily     Historical Provider, MD   insulin lispro (HUMALOG) 100 UNIT/ML injection vial Inject 0-6 Units into the skin 3 times daily (with meals) 2/28/19   ESDRAS Dolan   levothyroxine (SYNTHROID) 50 MCG tablet Take 50 mcg by mouth Daily    Historical Provider, MD   furosemide (LASIX) 40 MG tablet Take 40 mg by mouth Indications: M-W-F     Historical Provider, MD   meclizine (ANTIVERT) 25 MG tablet Take 25 mg by mouth three times daily    Historical Provider, MD       Allergies:  Ambien [zolpidem tartrate]; Capoten [captopril]; Clioquinol; Cogentin [benztropine]; Depakote [divalproex sodium]; Effexor xr [venlafaxine hcl er]; Geodon [ziprasidone hcl]; Lyrica [pregabalin]; Navane [thiothixene]; Pamelor [nortriptyline hcl]; Remeron [mirtazapine]; Risperdal [risperidone]; Trazodone and nefazodone; and Wellbutrin [bupropion]    Social History:   TOBACCO:   reports that she has never smoked. She has never used smokeless tobacco.  ETOH:   reports no history of alcohol use. Family History:   History reviewed. No pertinent family history. REVIEW OF SYSTEMS:  Ten systems reviewed and negative except for stated in HPI    Physical Exam:    Vitals: BP (!) 141/70   Pulse 67   Temp 97.6 °F (36.4 °C) (Oral)   Resp 18   Ht 5' 4\" (1.626 m)   Wt 235 lb (106.6 kg)   SpO2 98%   BMI 40.34 kg/m²     General appearance: awake and alert, cooperative, in some discomfort due to pain in her feet. Skin: Skin of B/L lower extremities appears to be diffusely xerotic and taut with erythema noted to B/L forefoot. Blood Pander HEENT: head is normocephalic and atraumatic, Eyes are PERRL  Neck: supple, no JVD. Lungs: CTA bilaterally, no wheezes, rales or rhonchi   Heart: RRR, normal S1 and S2 . Abdomen: soft, non-tender, active BS.   Extremities: Mild edema of the feet and toes bilaterally, with erythema of the left forefoot with severely meds    CKD3  - monitor renal function            Becky Cronin MD  Admitting Hospitalist

## 2020-03-05 ENCOUNTER — APPOINTMENT (OUTPATIENT)
Dept: ULTRASOUND IMAGING | Age: 65
DRG: 197 | End: 2020-03-05
Payer: COMMERCIAL

## 2020-03-05 LAB
ALBUMIN SERPL-MCNC: 3.2 G/DL (ref 3.5–4.6)
ANION GAP SERPL CALCULATED.3IONS-SCNC: 12 MEQ/L (ref 9–15)
BUN BLDV-MCNC: 26 MG/DL (ref 8–23)
C-REACTIVE PROTEIN, HIGH SENSITIVITY: 19.1 MG/L (ref 0–5)
CALCIUM SERPL-MCNC: 9.2 MG/DL (ref 8.5–9.9)
CHLORIDE BLD-SCNC: 103 MEQ/L (ref 95–107)
CO2: 23 MEQ/L (ref 20–31)
CREAT SERPL-MCNC: 1.18 MG/DL (ref 0.5–0.9)
GFR AFRICAN AMERICAN: 56.1
GFR NON-AFRICAN AMERICAN: 46.4
GLUCOSE BLD-MCNC: 137 MG/DL (ref 60–115)
GLUCOSE BLD-MCNC: 174 MG/DL (ref 60–115)
GLUCOSE BLD-MCNC: 185 MG/DL (ref 70–99)
GLUCOSE BLD-MCNC: 212 MG/DL (ref 60–115)
GLUCOSE BLD-MCNC: 249 MG/DL (ref 60–115)
HBA1C MFR BLD: 8 % (ref 4.8–5.9)
HCT VFR BLD CALC: 34.2 % (ref 37–47)
HEMOGLOBIN: 11.5 G/DL (ref 12–16)
MCH RBC QN AUTO: 25.4 PG (ref 27–31.3)
MCHC RBC AUTO-ENTMCNC: 33.7 % (ref 33–37)
MCV RBC AUTO: 75.3 FL (ref 82–100)
PDW BLD-RTO: 17.5 % (ref 11.5–14.5)
PERFORMED ON: ABNORMAL
PHOSPHORUS: 3.3 MG/DL (ref 2.3–4.8)
PLATELET # BLD: 250 K/UL (ref 130–400)
POTASSIUM SERPL-SCNC: 4.9 MEQ/L (ref 3.4–4.9)
PROCALCITONIN: 0.08 NG/ML (ref 0–0.15)
RBC # BLD: 4.54 M/UL (ref 4.2–5.4)
SEDIMENTATION RATE, ERYTHROCYTE: 33 MM (ref 0–30)
SODIUM BLD-SCNC: 138 MEQ/L (ref 135–144)
WBC # BLD: 8 K/UL (ref 4.8–10.8)

## 2020-03-05 PROCEDURE — 36415 COLL VENOUS BLD VENIPUNCTURE: CPT

## 2020-03-05 PROCEDURE — 87077 CULTURE AEROBIC IDENTIFY: CPT

## 2020-03-05 PROCEDURE — 86141 C-REACTIVE PROTEIN HS: CPT

## 2020-03-05 PROCEDURE — 85027 COMPLETE CBC AUTOMATED: CPT

## 2020-03-05 PROCEDURE — 2580000003 HC RX 258: Performed by: INTERNAL MEDICINE

## 2020-03-05 PROCEDURE — 87070 CULTURE OTHR SPECIMN AEROBIC: CPT

## 2020-03-05 PROCEDURE — 6360000002 HC RX W HCPCS: Performed by: INTERNAL MEDICINE

## 2020-03-05 PROCEDURE — 87205 SMEAR GRAM STAIN: CPT

## 2020-03-05 PROCEDURE — 87186 SC STD MICRODIL/AGAR DIL: CPT

## 2020-03-05 PROCEDURE — 93922 UPR/L XTREMITY ART 2 LEVELS: CPT

## 2020-03-05 PROCEDURE — 83036 HEMOGLOBIN GLYCOSYLATED A1C: CPT

## 2020-03-05 PROCEDURE — 2700000000 HC OXYGEN THERAPY PER DAY

## 2020-03-05 PROCEDURE — 6370000000 HC RX 637 (ALT 250 FOR IP): Performed by: INTERNAL MEDICINE

## 2020-03-05 PROCEDURE — 93922 UPR/L XTREMITY ART 2 LEVELS: CPT | Performed by: INTERNAL MEDICINE

## 2020-03-05 PROCEDURE — 87075 CULTR BACTERIA EXCEPT BLOOD: CPT

## 2020-03-05 PROCEDURE — 1210000000 HC MED SURG R&B

## 2020-03-05 PROCEDURE — 80069 RENAL FUNCTION PANEL: CPT

## 2020-03-05 PROCEDURE — 84145 PROCALCITONIN (PCT): CPT

## 2020-03-05 PROCEDURE — 99254 IP/OBS CNSLTJ NEW/EST MOD 60: CPT | Performed by: INTERNAL MEDICINE

## 2020-03-05 PROCEDURE — 85652 RBC SED RATE AUTOMATED: CPT

## 2020-03-05 RX ADMIN — Medication 10 ML: at 08:39

## 2020-03-05 RX ADMIN — FOLIC ACID 1 MG: 1 TABLET ORAL at 08:38

## 2020-03-05 RX ADMIN — VANCOMYCIN HYDROCHLORIDE 1000 MG: 1 INJECTION, POWDER, LYOPHILIZED, FOR SOLUTION INTRAVENOUS at 11:16

## 2020-03-05 RX ADMIN — HYDROCODONE BITARTRATE AND ACETAMINOPHEN 1 TABLET: 5; 325 TABLET ORAL at 17:15

## 2020-03-05 RX ADMIN — LEVOTHYROXINE SODIUM 50 MCG: 0.05 TABLET ORAL at 06:21

## 2020-03-05 RX ADMIN — INSULIN LISPRO 2 UNITS: 100 INJECTION, SOLUTION INTRAVENOUS; SUBCUTANEOUS at 12:27

## 2020-03-05 RX ADMIN — GABAPENTIN 600 MG: 300 CAPSULE ORAL at 08:37

## 2020-03-05 RX ADMIN — PIPERACILLIN AND TAZOBACTAM 3.38 G: 3; .375 INJECTION, POWDER, LYOPHILIZED, FOR SOLUTION INTRAVENOUS at 04:31

## 2020-03-05 RX ADMIN — HYDROCODONE BITARTRATE AND ACETAMINOPHEN 1 TABLET: 5; 325 TABLET ORAL at 06:21

## 2020-03-05 RX ADMIN — INSULIN LISPRO 2 UNITS: 100 INJECTION, SOLUTION INTRAVENOUS; SUBCUTANEOUS at 17:11

## 2020-03-05 RX ADMIN — Medication 10 ML: at 23:15

## 2020-03-05 RX ADMIN — CLOPIDOGREL BISULFATE 75 MG: 75 TABLET ORAL at 08:38

## 2020-03-05 RX ADMIN — PIPERACILLIN AND TAZOBACTAM 3.38 G: 3; .375 INJECTION, POWDER, LYOPHILIZED, FOR SOLUTION INTRAVENOUS at 23:16

## 2020-03-05 RX ADMIN — ATORVASTATIN CALCIUM 40 MG: 40 TABLET, FILM COATED ORAL at 22:58

## 2020-03-05 RX ADMIN — SERTRALINE 200 MG: 100 TABLET, FILM COATED ORAL at 08:37

## 2020-03-05 RX ADMIN — ENOXAPARIN SODIUM 40 MG: 40 INJECTION SUBCUTANEOUS at 08:39

## 2020-03-05 RX ADMIN — ASPIRIN 81 MG 81 MG: 81 TABLET ORAL at 08:39

## 2020-03-05 RX ADMIN — METOPROLOL TARTRATE 50 MG: 50 TABLET, FILM COATED ORAL at 08:37

## 2020-03-05 RX ADMIN — PIPERACILLIN AND TAZOBACTAM 3.38 G: 3; .375 INJECTION, POWDER, LYOPHILIZED, FOR SOLUTION INTRAVENOUS at 12:27

## 2020-03-05 RX ADMIN — SODIUM CHLORIDE: 9 INJECTION, SOLUTION INTRAVENOUS at 04:32

## 2020-03-05 ASSESSMENT — PAIN DESCRIPTION - PAIN TYPE: TYPE: ACUTE PAIN

## 2020-03-05 ASSESSMENT — PAIN SCALES - GENERAL
PAINLEVEL_OUTOF10: 8
PAINLEVEL_OUTOF10: 6
PAINLEVEL_OUTOF10: 0

## 2020-03-05 ASSESSMENT — PAIN DESCRIPTION - LOCATION
LOCATION: FOOT;LEG
LOCATION: LEG;FOOT

## 2020-03-05 NOTE — DISCHARGE INSTR - COC
Continuity of Care Form    Patient Name: Yola Hawley   :  1955  MRN:  [id number]    Admit date:  3/4/2020  Discharge date:  3/10/20    Code Status Order: Full Code   Advance Directives:   Advance Care Flowsheet Documentation     Date/Time Healthcare Directive Type of Healthcare Directive Copy in [address] Agent's Name Healthcare Agent's Phone Number    20 1803  No, patient does not have an advance directive for healthcare treatment  --  --  --  --  --          Admitting Physician:  Freddy Davis MD  PCP: Jessica Garcia    Discharging Nurse: [facility] Unit/Room#: T652/M149-15  Discharging Unit Phone Number: [phone]    Emergency Contact:   Extended Emergency Contact Information  Primary Emergency Contact: [address] [place] [address] Phone: [phone]  Relation: Spouse    Past Surgical History:  Past Surgical History:   Procedure Laterality Date    CARDIAC SURGERY       SECTION      COLONOSCOPY N/A 2019    COLONOSCOPY DIAGNOSTIC performed by Letha Parra MD at [facility]  2019    unknown vessels    HYSTERECTOMY      TOE AMPUTATION Right     3rd toe       Immunization History: There is no immunization history on file for this patient.     Active Problems:  Patient Active Problem List   Diagnosis Code    Severe major depression with psychotic features ([address] F32.3    Type 2 diabetes mellitus with hyperglycemia, with long-term current use of insulin (HCC) E11.65, Z79.4    HTN (hypertension) I10    HLD (hyperlipidemia) E78.5    Hypothyroid E03.9    HF (heart failure) ([address] I50.9    Non-pressure ulcer of toe ([address] L97.509    Atherosclerotic PVD with intermittent claudication ([address] I70.219    Acute osteomyelitis ([address] M86.10    Skin infection L08.9    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 A49.8    Surgical wound dehiscence, initial encounter T81. 31XA    S/P amputation of lesser toe, right ([facility]) Z89.421    Rectal bleeding K62.5    Diabetic ulcer of right foot with bone involvement without evidence of necrosis ([facility]) E11.621, L97.516    Athscl native arteries of left leg w ulceration oth prt foot ([facility]) I70.245    JESICA (obstructive sleep apnea) G47.33    Neuropathy due to secondary diabetes ([facility]) E13.40    Chest pain R07.9    PAD (peripheral artery disease) ([facility]) I73.9    Cellulitis L03.90       Isolation/Infection:   Isolation          Contact        Patient Infection Status     Infection Onset Added Last Indicated Last Indicated By Review Planned Expiration Resolved Resolved By    MRSA 08/09/19 08/11/19 08/09/19 WOUND CULTURE        MRSA left 3rd toe on 8/9/2019. Electronically signed by Irwin Vazquez RN on 8/11/2019 at 9:15 PM           Nurse Assessment:  Last Vital Signs: /81   Pulse 75   Temp 97.7 °F (36.5 °C)   Resp 18   Ht 5' 5\" (1.651 m)   Wt 237 lb (107.5 kg)   SpO2 99%   BMI 39.44 kg/m²     Last documented pain score (0-10 scale): Pain Level: 6  Last Weight:   Wt Readings from Last 1 Encounters:   03/04/20 237 lb (107.5 kg)     Mental Status:  oriented and alert    IV Access:  - None    Nursing Mobility/ADLs:  Walking   Independent  Transfer  Independent  Bathing  Independent  Dressing  Assisted  Toileting  Independent  Feeding  Independent  Med Admin  Independent  Med Delivery   whole    Wound Care Documentation and Therapy:  Wound 04/23/19 Toe (Comment  which one) Right #1. 3rd toe (Active)   Wound Image   2/21/2020  1:53 PM   Wound Non-Healing Surgical 2/27/2020  9:09 AM   Offloading for Diabetic Foot Ulcers Post op shoe 2/27/2020  9:09 AM   Dressing Status Clean;Dry; Intact 2/27/2020  9:09 AM   Wound Cleansed Rinsed/Irrigated with saline 2/21/2020  1:53 PM   Wound Length (cm) 0.6 cm 2/21/2020  1:53 PM   Wound Width (cm) 0.7 cm 2/21/2020  1:53 PM   Wound Depth (cm) 0.6 cm 2/21/2020  1:53 PM   Wound Surface Area (cm^2) Falls    Impairments/Disabilities:      None    Nutrition Therapy:  Current Nutrition Therapy:   - Oral Diet:  Carb Control 4 carbs/meal (1800kcals/day)    Routes of Feeding: Oral  Liquids: Thin Liquids  Daily Fluid Restriction: no  Last Modified Barium Swallow with Video (Video Swallowing Test): not done    Treatments at the Time of Hospital Discharge:   Respiratory Treatments: ***  Oxygen Therapy:  is not on home oxygen therapy.   Ventilator:    - No ventilator support    Rehab Therapies: Physical Therapy and Occupational Therapy  Weight Bearing Status/Restrictions: No weight bearing restirctions  Other Medical Equipment (for information only, NOT a DME order):  walker, bedside commode and hospital bed  Other Treatments: ***      Dressing care: Daily dressing care changes  Cleans all wounds with normal saline     Left foot:  Heel- Bactroban followed by ABD to heel and ankle followed by kerlix   4th interspace- IoPlex between 4th and 5th toes    Right foot:  3rd digit amp site- rudi moistened with 3 drops of saline followed by DSD         Patient's personal belongings (please select all that are sent with patient):  João    RN SIGNATURE:  Electronically signed by Sheila Cruz RN on 3/10/20 at 3:20 PM EDT    CASE MANAGEMENT/SOCIAL WORK SECTION    Inpatient Status Date: ***    Readmission Risk Assessment Score:  Readmission Risk              Risk of Unplanned Readmission:        27           Discharging to Facility/ Agency   · Name: [facility]   · Address: [address] [place], [facility]  · Phone: [phone]  · Fax: [phone]    Dialysis Facility (if applicable)   · Name:  · Address:  · Dialysis Schedule:  · Phone:  · Fax:    / signature: Electronically signed by Tu Huff RN on 3/10/20 at 5:22 PM EDT    PHYSICIAN SECTION    Prognosis: {Prognosis:[id number]}    Condition at Discharge: [facility] Patient Condition:[id number]}    Rehab Potential (if transferring to Rehab):

## 2020-03-05 NOTE — CONSULTS
PODIATRIC MEDICINE AND SURGERY  CONSULT HISTORY AND PHYSICAL    Consulting Service:  Medicine   Requesting Provider: Dr. Emiliano Gomes regarding: foot wounds   Staff Doctor:  Dr. Wilkinson Amisha:  60 y.o. female with PMH below for who podiatry was consulted for left 4th interspace wound and right 3rd digit amp site wound. The right 3rd digit amputation site is stable without signs of infection. The left foot 4th interspace wound with erythema and edema without overt signs of infection     PLAN AND RECOMMENDATIONS[de-identified]  Patient's case to be discussed with staff, Dr. Kristie Wylie, who will provide final recommendations going forward  Wound care:   Left foot: IoPlex to 4th interspace and Bactroban to left medial heel   Right foot: Sudha with DSD   WB as tolerated with use of surgical shoe bilateral   Elevate BLE at or above heart level while resting  No osseous abnormality on XR left foot   art duplex ultrasound ordered   Culture taken of left 4th webspace   Antibiotic coverage per primary or ID  Pain management per primary team   Podiatry to follow while in house  Patient will need follow up with Dr. Annetta Delgadillo in the wound center within 7-10 days of discharge      HPI: This 60y.o. year old female was seen today for bilateral foot wounds. Patient is an established patient with Dr. Annetta Delgadillo who has been treating her wound since September. States that she has had the left foot wound for about 3 months. She has been using saline and gauze to dress the wound with HBO therapy. States that she does not think they have gotten any better. Most of her pain is located at the left 5th toe and 4th interspace. She cam tot he ED yesterday after she missed her appointment with Dr. Annetta Delgadillo and would not see the NP. Patient denies nausea, vomiting, diarrhea, fevers, chills, chest pain, shortness of breath, head ache, or calf pain. No other pedal complaints.      Past Medical History:   Diagnosis Date    Asthma     CAD (coronary artery disease)     CKD (chronic kidney disease) stage 3, GFR 30-59 ml/min ([facility])     Colitis     Diabetes mellitus [facility]     Hyperlipidemia     Hypertension     PAD (peripheral artery disease) ([facility])     Prolonged emergence from general anesthesia     PVD (peripheral vascular disease) ([facility]     Thyroid disease        Past Surgical History:   Procedure Laterality Date    CARDIAC SURGERY       SECTION      COLONOSCOPY N/A 2019    COLONOSCOPY DIAGNOSTIC performed by Harlan Alberts MD at [address] [place] [address] [facility]  2019    unknown vessels    HYSTERECTOMY      TOE AMPUTATION Right     3rd toe       No current facility-administered medications on file prior to encounter. Current Outpatient Medications on File Prior to Encounter   Medication Sig Dispense Refill    ALPRAZolam (XANAX) 1 MG tablet Take 2 mg by mouth nightly as needed for Sleep.  metoprolol tartrate (LOPRESSOR) 50 MG tablet Take 50 mg by mouth daily      gabapentin (NEURONTIN) 600 MG tablet Take 600 mg by mouth daily.  sertraline (ZOLOFT) 50 MG tablet Take 4 tablets by mouth daily      insulin glargine (LANTUS) 100 UNIT/ML injection vial Inject 35 Units into the skin nightly      insulin lispro (HUMALOG) 100 UNIT/ML injection vial Inject 10 Units into the skin 3 times daily (before meals)       hydrOXYzine (VISTARIL) 50 MG capsule Take 50 mg by mouth 3 times daily as needed for Itching       haloperidol (HALDOL) 5 MG tablet Take 5 mg by mouth daily      FreeStyle Lancets MISC 1 each by Does not apply route daily 100 each 3    blood glucose test strips (FREESTYLE LITE) strip 1 each by In Vitro route daily As needed.  100 each 3    Alcohol Swabs (ALCOHOL PREP) 70 % PADS qid (Patient taking differently: Apply 1 each topically 4 times daily qid) 300 each 06    Insulin Pen Needle (NOVOFINE) 32G X 6 MM MISC Qid (Patient taking differently: 1 each 3 times daily Qid) 300 each 3    atorvastatin (LIPITOR) 40 MG tablet Take 40 mg by mouth daily      aspirin 81 MG chewable tablet Take 81 mg by mouth daily      folic acid (FOLVITE) 1 MG tablet Take 1 mg by mouth daily      Iron Polysacch Wrree-R31-PI (NIFEREX-150 FORTE PO) Take 1 tablet by mouth daily       clopidogrel (PLAVIX) 75 MG tablet Take 75 mg by mouth daily      Cyanocobalamin (VITAMIN B-12) 1000 MCG extended release tablet Take 1,000 mcg by mouth daily      Cholecalciferol (VITAMIN D3) 71681 units CAPS Take 1 capsule by mouth once a week Indications: weekly on Sun       Cholecalciferol (VITAMIN D) 2000 units CAPS capsule Take 2,000 Units by mouth daily       lisinopril (PRINIVIL;ZESTRIL) 10 MG tablet Take 10 mg by mouth daily       insulin lispro (HUMALOG) 100 UNIT/ML injection vial Inject 0-6 Units into the skin 3 times daily (with meals) 1 vial 3    levothyroxine (SYNTHROID) 50 MCG tablet Take 50 mcg by mouth Daily      furosemide (LASIX) 40 MG tablet Take 40 mg by mouth Indications: M-W-F       meclizine (ANTIVERT) 25 MG tablet Take 25 mg by mouth three times daily         Allergies   Allergen Reactions    Ambien [Zolpidem Tartrate]     Capoten [Captopril]     Clioquinol     Cogentin [Benztropine]     Depakote [Divalproex Sodium]     Effexor Xr [Venlafaxine Hcl Er]     Geodon [Ziprasidone Hcl]     Lyrica [Pregabalin]     Navane [Thiothixene]     Pamelor [Nortriptyline Hcl]     Remeron [Mirtazapine]     Risperdal [Risperidone]     Trazodone And Nefazodone     Wellbutrin [Bupropion]        History reviewed. No pertinent family history.     Social History     Socioeconomic History    Marital status:      Spouse name: Not on file    Number of children: Not on file    Years of education: Not on file    Highest education level: Not on file   Occupational History    Not on file   Social Needs    Financial resource strain: Not on file    Food insecurity:     Worry: Not on file     Inability: Not on file warm to cool tibial tuberosity to the digits B/L  Hair growth present to digits  mild edema  no varicosities     Neurological:   Sensation to light touch intact B/L  Protective sensation via monofilament testing impaired B/L  No clonus noted    Musculoskeletal/Orthopaedic:   Structural Deformities: right 3rd toe amputation   5/5 muscle strength Dorsiflexion, Plantarflexion, Inversion, Eversion B/L  ++ tenderness on palpation to the left 4th and 5th toe   ++ tenderness on palpation to the right 3rd toe interspace     Dermatological:   Skin appears xerotic No  hyperkeratotic lesions noted    Ulceration #1:   Location: right 3rd digit amputation site  Measurements: ~ 1.0 cm x 0.6 cm x 0.3 cm  Base: Mixed Granular/Fibrotic  Borders: viable  Exudate: minimal, serosanguinous drainage   Comments: mild periwound erythema and edema extending 1 cm beyond wound borders with no evidence of ascending lymphangitis. Does not probe to bone. No signs of infection     Ulceration #2:   Location: Left heel wound medial  Measurements: ~ 2.0 cm x 2.0 cm x superficial   Base: hyperkeratotic   Borders: hyperkeratotic   Exudate: none  Comments: No drainage or other signs of infection     Ulceration #3:   Location:4th interspace wound   Measurements:entire 4th interspace on both medial 5th digit and lateral 4th digit   Base: macerated  Borders: viable  Exudate: moderate   Comments: ijeoma wound erythema noted with edema. No streaking or other signs of infection. Fibrotic base with some granular tissue. ++pain.  No gross purulence noted     LABS:   Lab Results   Component Value Date    WBC 8.0 03/05/2020    HGB 11.5 (L) 03/05/2020    HCT 34.2 (L) 03/05/2020    MCV 75.3 (L) 03/05/2020     03/05/2020     Lab Results   Component Value Date     03/05/2020    K 4.9 03/05/2020    K 5.2 02/12/2020     03/05/2020    CO2 23 03/05/2020    BUN 26 03/05/2020    CREATININE 1.18 03/05/2020    GLUCOSE 185 03/05/2020    GLUCOSE 279 01/06/2020 CALCIUM 9.2 03/05/2020      Lab Results   Component Value Date    LABALBU 3.2 (L) 03/05/2020     Lab Results   Component Value Date    SEDRATE 33 (H) 03/05/2020     Lab Results   Component Value Date    CRP 11.7 (H) 05/25/2019     Lab Results   Component Value Date    LABA1C 8.0 (H) 03/05/2020       MICROBIOLOGY:   Type   Date  Results  Blood Culture: In process     IMAGING:   Left foot: \"  FINDINGS:       No acute fracture or dislocation. No osseous erosions. Joint spaces are preserved. Small plantar calcaneal enthesophyte. Vascular calcifications noted. Mild soft tissue swelling of the foot.      \"          Jacquelyn Sorenson DPM PGY-2  Podiatric Surgery Resident  Podiatry On Call Pager: [phone]  March 5, 2020  10:05 AM

## 2020-03-05 NOTE — CARE COORDINATION
[facility] AT [facility] Case Management Initial Discharge Assessment    Met with Patient and SPOUSE to discuss discharge plan. PATIENT IS Guyanese SPEAKING ONLY. TRANSLATION DEVICE USED FOR EFFECTIVE COMMUNICATION. PCP: Олег Garland                                Date of Last Visit: NOT TOO LONG AGO--1-2 WEEKS    If no PCP, list provided? N/A    Discharge Planning    Living Arrangements: at home dependent on family care and Eileen Raymundo    Who do you live with? HOME W/     Who helps you with your care:  self, spouse or HHC    If lives at home:     Do you have any barriers navigating in your home? no    Patient can perform ADL? Yes--W/  ASSIST SOMETIMES    Current Services (outpatient and in home) :  [facility] (Yaw [facility] and [facility] (DR. Kyle Kumari)    Dialysis: No    Is transportation available to get to your appointments? Yes    DME Equipment:  yes - PATIENT IS ASKING FOR W/C, A HOSPITAL BED, A SHOWER CHAIR AND A BSC/3 IN 1. RX ON CHART. Respiratory equipment: None    Respiratory provider:  no     Pharmacy:  yes - [address] Consult with Medication Assistance Program?  No      Patient agreeable to [place] [address]? Yes, [address] Patient agreeable to SNF/Rehab? No and Declined    Other discharge needs identified? Other PATIENT FOLLOWS DR. Michelle Grimes HYPERBARICS, PATIENT ALSO FOLLOW [facility] W/ DR. Kyle Kumari     Freedom of choice list provided with basic dialogue that supports the patient's individualized plan of care/goals and shares the quality data associated with the providers. Yes    Does Patient Have a High-Risk for Readmission Diagnosis (CHF, PN, MI, COPD)? No    The plan for Transition of Care is related to the following treatment goals: PATIENT TO SEE WHAT INFECTIOUS DISEASE SAYS AS WELL AS WHAT DR. POE WANTS TO GO DO IF ANYTHING.  SPOKE WITH DR. Kyle Kumari AND SHE HAS NO PLANS TO DO ANY

## 2020-03-05 NOTE — PROGRESS NOTES
[facility] Occupational Therapy      Date: 3/5/2020  Patient Name: Millie Pace        MRN: [id number]  Account: [de-identified]   : 1955  (60 y.o.)  Room: [facility]/[place] [address]    Chart reviewed, attempted OT at 1000 for eval. Patient not seen 2° to:    Hold pending podiatry recommendations re: weightbearing/wound management. Spoke to SALMA FRAZIER aware. Will attempt again when able.     Electronically signed by REAGAN Alonzo on 3/5/2020 at 10:01 AM

## 2020-03-05 NOTE — PROGRESS NOTES
Physical Therapy   Facility/[facility] MED SURG G401/T050-85    NAME: Tri Fleming    : 1955 (9 y.o.)  MRN: [id number]    Account: [de-identified]  Gender: female    PT evaluation and treatment orders received. Chart reviewed. PT eval attempted. Hold PT eval: Awaiting podiatry recommendations for weight bearing and pending amputation. Spoke with RN. Will attempt PT evaluation again at earliest convenience.       Electronically signed by Jessica Martinez on 3/5/20 at 9:56 AM

## 2020-03-05 NOTE — PROGRESS NOTES
250     Recent Labs     03/04/20  1345 03/05/20  0521    138   K 5.1* 4.9    103   CO2 23 23   BUN 28* 26*   CREATININE 1.28* 1.18*   CALCIUM 9.3 9.2   PHOS  --  3.3     Recent Labs     03/04/20  1345   AST 19   ALT 29   BILITOT 0.4   ALKPHOS 127     No results for input(s): INR in the last 72 hours. No results for input(s): Alferd Aleja in the last 72 hours. Urinalysis:      Lab Results   Component Value Date    NITRU Negative 02/21/2019    BLOODU Negative 02/21/2019    SPECGRAV 1.018 02/21/2019    GLUCOSEU 250 02/21/2019       Radiology:  US DUP LOWER EXTREMITY LEFT ARTERIES   Final Result      PREDOMINANTLY DISTAL DISEASE OF THE LEFT LOWER EXTREMITY ARTERIAL SYSTEM. NO EVIDENCE OF A FLOW-LIMITING STENOSIS, ARTERIAL OCCLUSION, OR ANEURYSM IDENTIFIED. STENOSIS CRITERIA      % Stenosis                        Peak Andrew      Normal                                <150 cm/s   30%-49%                            150-200 cm/s   50%-75%                            200-400 cm/s   >75%                                  >400 cm/s   Occlusion                              No color saturation   ____________________________________________   Sergey DUNAWAY, Garcia JE, et al.   Comparison of contrast angiography to arterial mapping   with color flow duplex imaging in the lower extremities. J Vasc Surg 1989;10:522-32                  XR FOOT LEFT (MIN 3 VIEWS)   Final Result                                                                                      No acute osseous abnormality.                     Assessment/Plan:    60 y.o. Romansh speaking female with PMH of CAD, IDDM, PAD, CKD3 who presented with:     Left 4th interspace wound infection / cellulitis  - continue fluids and antibiotic therapy  - follow cultures  - ID and podiatry consulted     DM with hyperglycemia  - ISS, lantus, hypoglycemia protocol     Hyperkalemia   - mild, monitor     PVD s/p recent angiogram of LLE with SFA, popliteal artery, tibial-peroneal trunk and peroneal atherectomy   - no further vascular intervention planned  - continue home meds     CKD3  - monitor renal function                      Electronically signed by Fátima Butterfield MD on 3/5/2020 at 11:29 AM

## 2020-03-05 NOTE — CONSULTS
tartrate]; Capoten [captopril]; Clioquinol; Cogentin [benztropine]; Depakote [divalproex sodium]; Effexor xr [venlafaxine hcl er]; Geodon [ziprasidone hcl]; Lyrica [pregabalin]; Navane [thiothixene]; Pamelor [nortriptyline hcl]; Remeron [mirtazapine]; Risperdal [risperidone]; Trazodone and nefazodone; and Wellbutrin [bupropion]    Social History:   TOBACCO:   reports that she has never smoked. She has never used smokeless tobacco.  Family History:   History reviewed. No pertinent family history.     REVIEW OF SYSTEMS:    CONSTITUTIONAL:  Reports fatigue, denies chills, fever  RESPIRATORY:  negative for  Cough, SOB  CARDIOVASCULAR:  negative for  chest pain, palpitations  GASTROINTESTINAL:  negative for vomiting, diarrhea and abdominal pain  HEMATOLOGIC/LYMPHATIC:  negative for easy bruising and bleeding  ENDOCRINE:  negative for weight changes  MUSCULOSKELETAL:  Reports arthritis, difficulty walking due to foot pain   NEUROLOGICAL:  negative for dizziness and speech problems    PHYSICAL EXAM:    VITALS:  /81   Pulse 75   Temp 97.7 °F (36.5 °C)   Resp 18   Ht 5' 5\" (1.651 m)   Wt 237 lb (107.5 kg)   SpO2 99%   BMI 39.44 kg/m²   CONSTITUTIONAL:  Awake, alert, uncomfortable due to pain   ENT:  normocepalic, without obvious abnormality  NECK:  supple, symmetrical, trachea midline  LUNGS:  No increased work of breathing, good air exchange, clear to auscultation bilaterally, no crackles or wheezing  CARDIOVASCULAR:  normal apical pulses and normal S1 and S2  ABDOMEN:  Obese, soft, BS +  MUSCULOSKELETAL:  No joint swelling  NEUROLOGIC:  Alert and oriented  SKIN:  Wound to left webspace between 4th and 5th toes, distal phalanx of 5th toe and superficial wound to the medial left heel   VASCULAR: left pedal pulses doppled bipasic  DATA:    CBC:   Lab Results   Component Value Date    WBC 8.0 03/05/2020    RBC 4.54 03/05/2020    HGB 11.5 03/05/2020    HCT 34.2 03/05/2020    MCV 75.3 03/05/2020    MCH 25.4

## 2020-03-06 ENCOUNTER — APPOINTMENT (OUTPATIENT)
Dept: MRI IMAGING | Age: 65
DRG: 197 | End: 2020-03-06
Payer: COMMERCIAL

## 2020-03-06 LAB
GLUCOSE BLD-MCNC: 142 MG/DL (ref 60–115)
GLUCOSE BLD-MCNC: 162 MG/DL (ref 60–115)
GLUCOSE BLD-MCNC: 178 MG/DL (ref 60–115)
GLUCOSE BLD-MCNC: 354 MG/DL (ref 60–115)
PERFORMED ON: ABNORMAL

## 2020-03-06 PROCEDURE — 6360000002 HC RX W HCPCS: Performed by: INTERNAL MEDICINE

## 2020-03-06 PROCEDURE — 6370000000 HC RX 637 (ALT 250 FOR IP): Performed by: STUDENT IN AN ORGANIZED HEALTH CARE EDUCATION/TRAINING PROGRAM

## 2020-03-06 PROCEDURE — 1210000000 HC MED SURG R&B

## 2020-03-06 PROCEDURE — 6370000000 HC RX 637 (ALT 250 FOR IP): Performed by: INTERNAL MEDICINE

## 2020-03-06 PROCEDURE — 97166 OT EVAL MOD COMPLEX 45 MIN: CPT

## 2020-03-06 PROCEDURE — A9579 GAD-BASE MR CONTRAST NOS,1ML: HCPCS | Performed by: INTERNAL MEDICINE

## 2020-03-06 PROCEDURE — 97162 PT EVAL MOD COMPLEX 30 MIN: CPT

## 2020-03-06 PROCEDURE — 6360000004 HC RX CONTRAST MEDICATION: Performed by: INTERNAL MEDICINE

## 2020-03-06 PROCEDURE — 2580000003 HC RX 258: Performed by: INTERNAL MEDICINE

## 2020-03-06 PROCEDURE — 99232 SBSQ HOSP IP/OBS MODERATE 35: CPT | Performed by: INTERNAL MEDICINE

## 2020-03-06 PROCEDURE — 73720 MRI LWR EXTREMITY W/O&W/DYE: CPT

## 2020-03-06 RX ORDER — DOXYCYCLINE HYCLATE 100 MG/1
100 CAPSULE ORAL EVERY 12 HOURS SCHEDULED
Status: DISCONTINUED | OUTPATIENT
Start: 2020-03-06 | End: 2020-03-07

## 2020-03-06 RX ORDER — SODIUM CHLORIDE 0.9 % (FLUSH) 0.9 %
10 SYRINGE (ML) INJECTION 2 TIMES DAILY
Status: DISCONTINUED | OUTPATIENT
Start: 2020-03-06 | End: 2020-03-10 | Stop reason: HOSPADM

## 2020-03-06 RX ADMIN — GABAPENTIN 600 MG: 300 CAPSULE ORAL at 09:51

## 2020-03-06 RX ADMIN — VANCOMYCIN HYDROCHLORIDE 1000 MG: 1 INJECTION, POWDER, LYOPHILIZED, FOR SOLUTION INTRAVENOUS at 06:15

## 2020-03-06 RX ADMIN — ATORVASTATIN CALCIUM 40 MG: 40 TABLET, FILM COATED ORAL at 21:53

## 2020-03-06 RX ADMIN — VANCOMYCIN HYDROCHLORIDE 1000 MG: 1 INJECTION, POWDER, LYOPHILIZED, FOR SOLUTION INTRAVENOUS at 09:53

## 2020-03-06 RX ADMIN — HYDROCODONE BITARTRATE AND ACETAMINOPHEN 1 TABLET: 5; 325 TABLET ORAL at 21:53

## 2020-03-06 RX ADMIN — PIPERACILLIN AND TAZOBACTAM 3.38 G: 3; .375 INJECTION, POWDER, LYOPHILIZED, FOR SOLUTION INTRAVENOUS at 18:14

## 2020-03-06 RX ADMIN — CLOPIDOGREL BISULFATE 75 MG: 75 TABLET ORAL at 09:51

## 2020-03-06 RX ADMIN — FOLIC ACID 1 MG: 1 TABLET ORAL at 09:51

## 2020-03-06 RX ADMIN — SERTRALINE 200 MG: 100 TABLET, FILM COATED ORAL at 09:50

## 2020-03-06 RX ADMIN — HYDROCODONE BITARTRATE AND ACETAMINOPHEN 1 TABLET: 5; 325 TABLET ORAL at 06:15

## 2020-03-06 RX ADMIN — INSULIN LISPRO 5 UNITS: 100 INJECTION, SOLUTION INTRAVENOUS; SUBCUTANEOUS at 12:25

## 2020-03-06 RX ADMIN — VANCOMYCIN HYDROCHLORIDE 1000 MG: 1 INJECTION, POWDER, LYOPHILIZED, FOR SOLUTION INTRAVENOUS at 21:53

## 2020-03-06 RX ADMIN — HYDROCODONE BITARTRATE AND ACETAMINOPHEN 1 TABLET: 5; 325 TABLET ORAL at 10:25

## 2020-03-06 RX ADMIN — GADOTERIDOL 20 ML: 279.3 INJECTION, SOLUTION INTRAVENOUS at 14:42

## 2020-03-06 RX ADMIN — ENOXAPARIN SODIUM 40 MG: 40 INJECTION SUBCUTANEOUS at 09:51

## 2020-03-06 RX ADMIN — ASPIRIN 81 MG 81 MG: 81 TABLET ORAL at 09:50

## 2020-03-06 RX ADMIN — LEVOTHYROXINE SODIUM 50 MCG: 0.05 TABLET ORAL at 06:15

## 2020-03-06 RX ADMIN — INSULIN LISPRO 1 UNITS: 100 INJECTION, SOLUTION INTRAVENOUS; SUBCUTANEOUS at 17:20

## 2020-03-06 RX ADMIN — DOXYCYCLINE HYCLATE 100 MG: 100 CAPSULE ORAL at 23:05

## 2020-03-06 RX ADMIN — INSULIN GLARGINE 35 UNITS: 100 INJECTION, SOLUTION SUBCUTANEOUS at 21:52

## 2020-03-06 RX ADMIN — MUPIROCIN: 20 OINTMENT TOPICAL at 10:00

## 2020-03-06 RX ADMIN — INSULIN LISPRO 1 UNITS: 100 INJECTION, SOLUTION INTRAVENOUS; SUBCUTANEOUS at 08:52

## 2020-03-06 RX ADMIN — METOPROLOL TARTRATE 50 MG: 50 TABLET, FILM COATED ORAL at 09:51

## 2020-03-06 RX ADMIN — PIPERACILLIN AND TAZOBACTAM 3.38 G: 3; .375 INJECTION, POWDER, LYOPHILIZED, FOR SOLUTION INTRAVENOUS at 09:20

## 2020-03-06 ASSESSMENT — PAIN SCALES - WONG BAKER
WONGBAKER_NUMERICALRESPONSE: 0
WONGBAKER_NUMERICALRESPONSE: 0

## 2020-03-06 ASSESSMENT — PAIN DESCRIPTION - LOCATION
LOCATION: FOOT
LOCATION: FOOT

## 2020-03-06 ASSESSMENT — PAIN DESCRIPTION - PROGRESSION
CLINICAL_PROGRESSION: NOT CHANGED
CLINICAL_PROGRESSION: NOT CHANGED

## 2020-03-06 ASSESSMENT — PAIN SCALES - GENERAL
PAINLEVEL_OUTOF10: 8
PAINLEVEL_OUTOF10: 8
PAINLEVEL_OUTOF10: 10
PAINLEVEL_OUTOF10: 0
PAINLEVEL_OUTOF10: 6
PAINLEVEL_OUTOF10: 10
PAINLEVEL_OUTOF10: 3

## 2020-03-06 ASSESSMENT — PAIN DESCRIPTION - PAIN TYPE
TYPE: ACUTE PAIN
TYPE: ACUTE PAIN

## 2020-03-06 ASSESSMENT — PAIN DESCRIPTION - DESCRIPTORS: DESCRIPTORS: CONSTANT

## 2020-03-06 ASSESSMENT — PAIN - FUNCTIONAL ASSESSMENT: PAIN_FUNCTIONAL_ASSESSMENT: PREVENTS OR INTERFERES WITH ALL ACTIVE AND SOME PASSIVE ACTIVITIES

## 2020-03-06 ASSESSMENT — PAIN DESCRIPTION - FREQUENCY: FREQUENCY: CONTINUOUS

## 2020-03-06 ASSESSMENT — PAIN DESCRIPTION - ORIENTATION
ORIENTATION: LEFT
ORIENTATION: LEFT

## 2020-03-06 NOTE — PROGRESS NOTES
5. 1* 4.9    103   CO2 23 23   BUN 28* 26*   CREATININE 1.28* 1.18*   CALCIUM 9.3 9.2   PHOS  --  3.3     Recent Labs     03/04/20  1345   AST 19   ALT 29   BILITOT 0.4   ALKPHOS 127     No results for input(s): INR in the last 72 hours. No results for input(s): Monda Saupe in the last 72 hours. Urinalysis:      Lab Results   Component Value Date    NITRU Negative 02/21/2019    BLOODU Negative 02/21/2019    SPECGRAV 1.018 02/21/2019    GLUCOSEU 250 02/21/2019       Radiology:  US DUP LOWER EXTREMITY LEFT ARTERIES   Final Result      PREDOMINANTLY DISTAL DISEASE OF THE LEFT LOWER EXTREMITY ARTERIAL SYSTEM. NO EVIDENCE OF A FLOW-LIMITING STENOSIS, ARTERIAL OCCLUSION, OR ANEURYSM IDENTIFIED. STENOSIS CRITERIA      % Stenosis                        Peak Andrew      Normal                                <150 cm/s   30%-49%                            150-200 cm/s   50%-75%                            200-400 cm/s   >75%                                  >400 cm/s   Occlusion                              No color saturation   ____________________________________________   Nixon Greco DV, Gracia JE, et al.   Comparison of contrast angiography to arterial mapping   with color flow duplex imaging in the lower extremities. J Vasc Surg 1989;10:522-32                  XR FOOT LEFT (MIN 3 VIEWS)   Final Result                                                                                      No acute osseous abnormality.             US JIMI JEN LIMITED 1-2 LEVELS    (Results Pending)   MRI FOOT LEFT W CONTRAST    (Results Pending)           Assessment/Plan:    60 y.o. Macedonian speaking female with PMH of CAD, IDDM, PAD, CKD3 who presented with:     Left 4th interspace wound infection / cellulitis  - on IV Zosyn and Vanco  - follow cultures  - MRI of the left foot pending  - ID and podiatry following     DM with hyperglycemia  - ISS, lantus, hypoglycemia

## 2020-03-06 NOTE — PROGRESS NOTES
clubs or organizations: Not on file     Relationship status: Not on file    Intimate partner violence:     Fear of current or ex partner: Not on file     Emotionally abused: Not on file     Physically abused: Not on file     Forced sexual activity: Not on file   Other Topics Concern    Not on file   Social History Narrative    Not on file       History reviewed. No pertinent family history. No current facility-administered medications on file prior to encounter. Current Outpatient Medications on File Prior to Encounter   Medication Sig Dispense Refill    ALPRAZolam (XANAX) 1 MG tablet Take 2 mg by mouth nightly as needed for Sleep.  metoprolol tartrate (LOPRESSOR) 50 MG tablet Take 50 mg by mouth daily      gabapentin (NEURONTIN) 600 MG tablet Take 600 mg by mouth daily.  sertraline (ZOLOFT) 50 MG tablet Take 4 tablets by mouth daily      insulin glargine (LANTUS) 100 UNIT/ML injection vial Inject 35 Units into the skin nightly      insulin lispro (HUMALOG) 100 UNIT/ML injection vial Inject 10 Units into the skin 3 times daily (before meals)       hydrOXYzine (VISTARIL) 50 MG capsule Take 50 mg by mouth 3 times daily as needed for Itching       haloperidol (HALDOL) 5 MG tablet Take 5 mg by mouth daily      FreeStyle Lancets MISC 1 each by Does not apply route daily 100 each 3    blood glucose test strips (FREESTYLE LITE) strip 1 each by In Vitro route daily As needed.  100 each 3    Alcohol Swabs (ALCOHOL PREP) 70 % PADS qid (Patient taking differently: Apply 1 each topically 4 times daily qid) 300 each 06    Insulin Pen Needle (NOVOFINE) 32G X 6 MM MISC Qid (Patient taking differently: 1 each 3 times daily Qid) 300 each 3    atorvastatin (LIPITOR) 40 MG tablet Take 40 mg by mouth daily      aspirin 81 MG chewable tablet Take 81 mg by mouth daily      folic acid (FOLVITE) 1 MG tablet Take 1 mg by mouth daily      Iron Polysacch Upylp-Q66-AB (NIFEREX-150 FORTE PO) Take 1 tablet by mouth daily       clopidogrel (PLAVIX) 75 MG tablet Take 75 mg by mouth daily      Cyanocobalamin (VITAMIN B-12) 1000 MCG extended release tablet Take 1,000 mcg by mouth daily      Cholecalciferol (VITAMIN D3) 80186 units CAPS Take 1 capsule by mouth once a week Indications: weekly on Sun       Cholecalciferol (VITAMIN D) 2000 units CAPS capsule Take 2,000 Units by mouth daily       lisinopril (PRINIVIL;ZESTRIL) 10 MG tablet Take 10 mg by mouth daily       insulin lispro (HUMALOG) 100 UNIT/ML injection vial Inject 0-6 Units into the skin 3 times daily (with meals) 1 vial 3    levothyroxine (SYNTHROID) 50 MCG tablet Take 50 mcg by mouth Daily      furosemide (LASIX) 40 MG tablet Take 40 mg by mouth Indications: M-W-F       meclizine (ANTIVERT) 25 MG tablet Take 25 mg by mouth three times daily         Physical Exam:    General: Patient appears in no acute distress, cooperative  Skin: no new rashes or erythema or induration  HEENT: EOMI, MMM, Neck is supple  Heart: S1 S2  Lungs: bilaterally clear to auscultation  Abdomen: soft, ND, NTTP, +BS  Extrem:left 4th interspace vascular compromise with pain in the 5th toe L foot and metatarsal area . Also has L heel wound without drainage. Has right foot amputation site small wound. Neuro exam: CN II-XII intact, facial expressions symmertrical bilaterally, no slurred speech      Labs: I have reviewed all lab results by electronic record, including most recent CBC, metabolic panel, and pertinent abnormalities were addressed from an infectious disease perspective. WBC trends are being monitored.     Lab Results   Component Value Date     03/05/2020    K 4.9 03/05/2020    K 5.2 02/12/2020     03/05/2020    CO2 23 03/05/2020    BUN 26 03/05/2020    CREATININE 1.18 03/05/2020    GLUCOSE 185 03/05/2020    GLUCOSE 279 01/06/2020    CALCIUM 9.2 03/05/2020      Lab Results   Component Value Date    WBC 8.0 03/05/2020    HGB 11.5 (L) 03/05/2020    HCT 34.2 (L)

## 2020-03-06 NOTE — PROGRESS NOTES
not need physical assistance, requires verbal cues  Minimal, Moderate, Maximal Assistance = Pt. requires physical assistance (25%, 50%, 75% assist from helper) for task but is able to actively participate in task   Dependent = Pt. requires total assistance with task and is not able to actively participate with task completion     Functional Mobility:  Functional Mobility  Functional - Mobility Device: Rolling Walker  Activity: To/from bathroom  Assist Level: Supervision  Functional Mobility Comments: Increased time to ambulate due to pain  Transfers  Sit to stand: Stand by assistance  Stand to sit: Stand by assistance    Bed Mobility  Bed mobility  Supine to Sit: Stand by assistance  Sit to Supine: Stand by assistance    Seated and Standing Balance:  Balance  Sitting Balance: Independent  Standing Balance: Supervision    Functional Endurance:  Activity Tolerance  Activity Tolerance: Patient limited by pain    D/C Recommendations:  OT D/C RECOMMENDATIONS  REQUIRES OT FOLLOW UP: Yes    Equipment Recommendations:  OT Equipment Recommendations  Other: Continue to assess    OT Education:   OT Education  OT Education: OT Role, Plan of Care  Patient Education: Educated pt. on role of acute care OT  Barriers to Learning: None    OT Follow Up:  OT D/C RECOMMENDATIONS  REQUIRES OT FOLLOW UP: Yes       Assessment/Discharge Disposition:  Assessment: Pt. is a 60year old woman from home with spouse who presents to [facility] with the above deficits which impact her ability to perform ADLs and IADLs. Pt. would benefit from continued OT to maximize independence and safety with ADL tasks.    Performance deficits / Impairments: Decreased functional mobility , Decreased ADL status, Decreased strength, Decreased endurance, Decreased balance, Decreased high-level IADLs  Prognosis: Good  Discharge Recommendations: Continue to assess pending progress  Decision Making: Medium Complexity  History: Pt's medical history is moderately

## 2020-03-06 NOTE — PROGRESS NOTES
Physical Therapy Med Surg Initial Assessment  Facility/Department: [facility]  [place] [facility] [place]       NAME: Alyssa Campoverde  : 1955 (60 y.o.)  MRN: [id number]  CODE STATUS: Full Code    Date of Service: 3/6/2020    Patient Diagnosis(es): Cellulitis [C52.61]   Chief Complaint   Patient presents with    Other     missed wound clinic appointment today and refused to see NP. pt has been by clinic before     Patient Active Problem List    Diagnosis Date Noted    Cellulitis 2020    PAD (peripheral artery disease) ([facility] 2020    Chest pain 2020    Neuropathy due to secondary diabetes ([facility] 2020    JESICA (obstructive sleep apnea)     Athscl native arteries of left leg w ulceration oth prt foot ([address] 2019    Diabetic ulcer of right foot with bone involvement without evidence of necrosis ([address] 2019    Rectal bleeding     Surgical wound dehiscence, initial encounter 2019    S/P amputation of lesser toe, right ([address] 2019    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     Skin infection 2019    Acute osteomyelitis ([address] 2019    Atherosclerotic PVD with intermittent claudication ([address] 2019    Non-pressure ulcer of toe ([facility] 2019    Type 2 diabetes mellitus with hyperglycemia, with long-term current use of insulin ([address] 2019    HTN (hypertension) 2019    HLD (hyperlipidemia) 2019    Hypothyroid 2019    HF (heart failure) ([address] 2019    Severe major depression with psychotic features ([address] 2019        Past Medical History:   Diagnosis Date    Asthma     CAD (coronary artery disease)     CKD (chronic kidney disease) stage 3, GFR 30-59 ml/min ([facility]     Colitis     Diabetes mellitus ([address]     Hyperlipidemia     Hypertension     PAD (peripheral artery disease) ([facility])     Prolonged emergence from general anesthesia     PVD (peripheral vascular disease) ([address]     Thyroid disease Past Surgical History:   Procedure Laterality Date    CARDIAC SURGERY       SECTION      COLONOSCOPY N/A 2019    COLONOSCOPY DIAGNOSTIC performed by Harlan Alberts MD at [address] GRAFT  2019    unknown vessels    HYSTERECTOMY      TOE AMPUTATION Right     3rd toe       Chart Reviewed: Yes  Patient assessed for rehabilitation services?: Yes  Family / Caregiver Present: No  General Comment  Comments: Pt is Korean speaking- this PT is fluent in [address] Pt resting in bed- agreeable to PT eval    Restrictions:  Restrictions/Precautions: Fall Risk, Up as Tolerated, Contact Precautions, Weight Bearing(Medium falls; MRSA)  Lower Extremity Weight Bearing Restrictions  Right Lower Extremity Weight Bearing: Weight Bearing As Tolerated(In post op shoes)  Left Lower Extremity Weight Bearing: Weight Bearing As Tolerated(In post op shoes)  Position Activity Restriction  Other position/activity restrictions: B post op shoes     SUBJECTIVE: Subjective: Pt reporting 10/10 L foot pain. Pt is fearful of losing her L foot. Pain  Pre Treatment Pain Screening  Pain at present: 10  Scale Used: Numeric Score  Intervention List: Patient able to continue with treatment; Pt educated regarding timing of pain meds    Post Treatment Pain Screening:   Pain Screening  Patient Currently in Pain: Yes  Pain Assessment  Pain Assessment: 0-10  Pain Level: 10  Pain Type: Acute pain  Pain Location: Foot  Pain Orientation: Left  Pain Frequency: Continuous  Clinical Progression: Not changed  Functional Pain Assessment: Prevents or interferes with all active and some passive activities  Non-Pharmaceutical Pain Intervention(s): Repositioned;Rest;Elevation    Prior Level of Function:  Social/Functional History  Lives With: Spouse  Type of Home: Apartment  Home Access: Elevator  Bathroom Shower/Tub: Tub/Shower unit  Bathroom Equipment: Shower chair, Grab bars in shower(Shower chair does not work well)  Home Equipment: Rolling walker, Cane(rollator)  Receives Help From: Family(spouse assists with homemaking)  ADL Assistance: Independent  Homemaking Assistance: Needs assistance  Ambulation Assistance: Independent(rollator)  Transfer Assistance: Independent  Active : No    OBJECTIVE:   Vision: Impaired  Vision Exceptions: Wears glasses at all times  Hearing: Exceptions to [facility]  Hearing Exceptions: No hearing aid    Cognition:  Overall Orientation Status: Within Functional Limits  Follows Commands: Within Functional Limits    Observation/Palpation  Posture: Fair(age related changes)  Observation: visably in pain, cooperative    ROM:  RLE PROM: WFL  LLE PROM: WFL    Strength:  Strength RLE  Comment: functionally >/=3+/5  Strength LLE  Comment: functionally >/=3+/5    Neuro:  Balance  Posture: Fair  Sitting - Static: Good  Sitting - Dynamic: Good  Standing - Static: Fair;+  Standing - Dynamic: Fair;+  Comments: with 2ww     Motor Control  Gross Motor?: WFL       Bed mobility  Supine to Sit: Stand by assistance  Sit to Supine: Stand by assistance    Transfers  Sit to Stand: Stand by assistance  Stand to sit: Stand by assistance  Comment: post op shoes donned by OT    Ambulation  Ambulation?: Yes  WB Status: WBAT in post op shoes  Ambulation 1  Surface: level tile  Device: Rolling Walker  Assistance: Stand by assistance  Quality of Gait: flexed posture  Gait Deviations: Decreased step length;Decreased step height  Distance: 25ft  Comments: VC for breathing technique and pacing to improve safety. Activity Tolerance  Activity Tolerance: Patient limited by pain      PT Education  PT Education: PT Role;Plan of Care  Patient Education: post op shoes    ASSESSMENT:   Body structures, Functions, Activity limitations: Decreased functional mobility ; Decreased balance; Increased pain;Decreased posture;Decreased strength  Decision Making: Medium Complexity  History: med  Exam: med  Clinical Presentation: med    Prognosis: Good  Patient Education: post op shoes  Barriers to Learning: Kinyarwanda speaking    DISCHARGE RECOMMENDATIONS:  Discharge Recommendations: Continue to assess pending progress    Assessment: Pt present with L foot wound demonstrates the above deficits and decline in functional mobility status placing them at increased risk for falls. Pt would benefit from physical therapy to address above deficits and allow for safe return home at highest level of function, decrease risk for falls, and improve QOL. REQUIRES PT FOLLOW UP: Yes      PLAN OF CARE:  Plan  Times per week: 3-6  Plan weeks: course of admission  Current Treatment Recommendations: Strengthening, Balance Training, Transfer Training, Gait Training, Neuromuscular Re-education, Home Exercise Program, Patient/Caregiver Education & Training, Positioning, Equipment Evaluation, Education, & procurement, Safety Education & Training, Pain Management, Stair training, Functional Mobility Training  Safety Devices  Type of devices: Left in bed, Bed alarm in place, Call light within reach    Goals:  Long term goals  Long term goal 1: indep with bed mobility   Long term goal 2: indep with functional transfers  Long term goal 3: amb with 2ww 50ft with indep  Long term goal 4: indep with HEP to improve LE strength  Long term goal 5: Standing balance > Fair+    AMPAC (6 CLICK) [address] Mobility Raw Score : 18     Therapy Time:   Individual   Time In 0845   Time Out 0901   Minutes [address], [place], 03/06/20 at 9:19 AM       Definitions for assistance levels  Independent = pt does not require any physical supervision or assistance from another person for activity completion. Device may be needed.   Stand by assistance = pt requires verbal cues or instructions from another person, close to but not touching, to perform the activity  Minimal assistance= pt performs 75% or more of the activity; assistance is required to complete

## 2020-03-07 LAB
GLUCOSE BLD-MCNC: 112 MG/DL (ref 60–115)
GLUCOSE BLD-MCNC: 119 MG/DL (ref 60–115)
GLUCOSE BLD-MCNC: 162 MG/DL (ref 60–115)
GLUCOSE BLD-MCNC: 281 MG/DL (ref 60–115)
PERFORMED ON: ABNORMAL
PERFORMED ON: NORMAL

## 2020-03-07 PROCEDURE — 2580000003 HC RX 258: Performed by: INTERNAL MEDICINE

## 2020-03-07 PROCEDURE — 6370000000 HC RX 637 (ALT 250 FOR IP): Performed by: INTERNAL MEDICINE

## 2020-03-07 PROCEDURE — 6360000002 HC RX W HCPCS: Performed by: INTERNAL MEDICINE

## 2020-03-07 PROCEDURE — 1210000000 HC MED SURG R&B

## 2020-03-07 PROCEDURE — 99232 SBSQ HOSP IP/OBS MODERATE 35: CPT | Performed by: INTERNAL MEDICINE

## 2020-03-07 RX ORDER — DOXYCYCLINE HYCLATE 100 MG/1
100 CAPSULE ORAL EVERY 12 HOURS SCHEDULED
Qty: 20 CAPSULE | Refills: 0 | Status: SHIPPED | OUTPATIENT
Start: 2020-03-07 | End: 2020-03-17

## 2020-03-07 RX ORDER — HYDROCODONE BITARTRATE AND ACETAMINOPHEN 5; 325 MG/1; MG/1
1 TABLET ORAL EVERY 4 HOURS PRN
Qty: 28 TABLET | Refills: 0 | Status: SHIPPED | OUTPATIENT
Start: 2020-03-07 | End: 2020-03-14

## 2020-03-07 RX ADMIN — FOLIC ACID 1 MG: 1 TABLET ORAL at 09:26

## 2020-03-07 RX ADMIN — VANCOMYCIN HYDROCHLORIDE 1000 MG: 1 INJECTION, POWDER, LYOPHILIZED, FOR SOLUTION INTRAVENOUS at 14:04

## 2020-03-07 RX ADMIN — HYDROCODONE BITARTRATE AND ACETAMINOPHEN 1 TABLET: 5; 325 TABLET ORAL at 22:27

## 2020-03-07 RX ADMIN — LEVOTHYROXINE SODIUM 50 MCG: 0.05 TABLET ORAL at 06:47

## 2020-03-07 RX ADMIN — PIPERACILLIN AND TAZOBACTAM 3.38 G: 3; .375 INJECTION, POWDER, FOR SOLUTION INTRAVENOUS at 15:07

## 2020-03-07 RX ADMIN — SERTRALINE 200 MG: 100 TABLET, FILM COATED ORAL at 09:26

## 2020-03-07 RX ADMIN — MUPIROCIN: 20 OINTMENT TOPICAL at 09:28

## 2020-03-07 RX ADMIN — INSULIN LISPRO 3 UNITS: 100 INJECTION, SOLUTION INTRAVENOUS; SUBCUTANEOUS at 18:17

## 2020-03-07 RX ADMIN — GABAPENTIN 600 MG: 300 CAPSULE ORAL at 09:26

## 2020-03-07 RX ADMIN — ENOXAPARIN SODIUM 40 MG: 40 INJECTION SUBCUTANEOUS at 09:26

## 2020-03-07 RX ADMIN — ATORVASTATIN CALCIUM 40 MG: 40 TABLET, FILM COATED ORAL at 22:27

## 2020-03-07 RX ADMIN — PIPERACILLIN AND TAZOBACTAM 3.38 G: 3; .375 INJECTION, POWDER, FOR SOLUTION INTRAVENOUS at 22:34

## 2020-03-07 RX ADMIN — HYDROCODONE BITARTRATE AND ACETAMINOPHEN 1 TABLET: 5; 325 TABLET ORAL at 09:25

## 2020-03-07 RX ADMIN — INSULIN GLARGINE 35 UNITS: 100 INJECTION, SOLUTION SUBCUTANEOUS at 22:29

## 2020-03-07 RX ADMIN — Medication 10 ML: at 09:27

## 2020-03-07 RX ADMIN — INSULIN LISPRO 1 UNITS: 100 INJECTION, SOLUTION INTRAVENOUS; SUBCUTANEOUS at 13:06

## 2020-03-07 RX ADMIN — METOPROLOL TARTRATE 50 MG: 50 TABLET, FILM COATED ORAL at 09:26

## 2020-03-07 RX ADMIN — HYDROCODONE BITARTRATE AND ACETAMINOPHEN 1 TABLET: 5; 325 TABLET ORAL at 15:09

## 2020-03-07 RX ADMIN — DOXYCYCLINE HYCLATE 100 MG: 100 CAPSULE ORAL at 09:25

## 2020-03-07 RX ADMIN — ASPIRIN 81 MG 81 MG: 81 TABLET ORAL at 09:26

## 2020-03-07 RX ADMIN — CLOPIDOGREL BISULFATE 75 MG: 75 TABLET ORAL at 09:26

## 2020-03-07 ASSESSMENT — PAIN SCALES - GENERAL
PAINLEVEL_OUTOF10: 5
PAINLEVEL_OUTOF10: 7
PAINLEVEL_OUTOF10: 10

## 2020-03-07 ASSESSMENT — ENCOUNTER SYMPTOMS: GASTROINTESTINAL NEGATIVE: 1

## 2020-03-07 NOTE — PROGRESS NOTES
infection / cellulitis  - on IV Zosyn and Vanco  - wound cultures grew Staph.  - MRI of the left foot was negative for osteomyelitispending  - ID and podiatry following     DM with hyperglycemia  - ISS, lantus, hypoglycemia protocol     Hyperkalemia   - mild, monitor     PVD s/p recent angiogram of LLE with SFA, popliteal artery, tibial-peroneal trunk and peroneal atherectomy   - no further vascular intervention planned  - continue home meds     CKD3  - stable renal function    Disposition - home when OK with ID        Electronically signed by Grayson Saavedra MD on 3/7/2020 at 11:56 AM

## 2020-03-07 NOTE — PROGRESS NOTES
PODIATRIC MEDICINE AND SURGERY  Progress note    ASSESSMENT:  60 y.o. female with PMH below for who podiatry was consulted for left 4th interspace wound and right 3rd digit amp site wound. The right 3rd digit amputation site is stable without signs of infection. The left foot 4th interspace wound with erythema and edema without overt signs of infection     PLAN AND RECOMMENDATIONS[de-identified]  Patient's case to be discussed with staff, Dr. Neel Ballard, who will provide final recommendations going forward  Wound care: Nursing orders in  Left foot: IoPlex to 4th interspace and Bactroban to left medial heel   Right foot: Sudha with DSD   WB as tolerated with use of surgical shoe bilateral   Elevate BLE at or above heart level while resting  No osseous abnormality on XR left foot   No plans for surgical intervention at this time. MRI is negative for abscess or osteomyelitis. Culture growing staph and corynebacterium- Antibiotic coverage per ID  Pain management per primary team  Okay to discharge patient from podiatry standpoint  Patient will need follow up with Dr. Crystal Perez in the wound center within 7-10 days of discharge      Interval HPI: This 60y.o. year old female was seen today for bilateral foot wounds. Patient continues to be in a lot of pain.   No acute events overnight    Past Medical History:   Diagnosis Date    Asthma     CAD (coronary artery disease)     CKD (chronic kidney disease) stage 3, GFR 30-59 ml/min ([facility])     Colitis     Diabetes mellitus ([facility]     Hyperlipidemia     Hypertension     [facility]peripheral artery disease) ([facility])     Prolonged emergence from general anesthesia     PVD (peripheral vascular disease) ([facility])     Thyroid disease      PROS  Denies nausea vomiting fever chills shortness of breath chest pain  INTEGUMENTARY:  open skin lesion to bilateral foot     OBJECTIVE:  BP (!) 160/70   Pulse 68   Temp 97.5 °F (36.4 °C) (Oral)   Resp 18   Ht 5' 5\" (1.651 m)   Wt 237 lb (107.5 kg)   SpO2 99%   BMI 39.44 kg/m²   Patient is alert and oriented to person, place, and time    Vascular:   Non Palpable Dorsalis Pedis and non Palpable Posterior Tibial Pulses B/L   Biphasic DP on doppler and barley audible PT pulses bilateral   mild edema    Protective sensation via monofilament testing impaired B/L    ++ tenderness on palpation to the left 4th and 5th toe   ++ tenderness on palpation to the right 3rd toe interspace     Ulceration #1: Grossly unchanged  Location: right 3rd digit amputation site  Measurements: ~ 0.6 cm x 0.6 cm x 0.3 cm  Base: Mixed Granular/Fibrotic  Borders: viable  Exudate: minimal, serosanguinous drainage   Comments: mild periwound erythema and edema extending 1 cm beyond wound borders with no evidence of ascending lymphangitis. Does not probe to bone. No signs of infection     Ulceration #2:   Location: Left heel wound medial  Measurements: ~ 2.0 cm x 2.0 cm x 0.2 cm  Base: hyperkeratotic   Borders: hyperkeratotic   Exudate: none  Comments: No drainage or other signs of infection. Eschar noted    Ulceration #3:   Location:4th interspace wound   Measurements:entire 4th interspace on both medial 5th digit and lateral 4th digit   Base: macerated  Borders: viable  Exudate: moderate serosanguineous  Comments: ijeoma wound erythema noted with edema. No streaking or other signs of infection. Fibrotic base with some granular tissue. ++pain.  No gross purulence noted     LABS:   Lab Results   Component Value Date    WBC 8.0 03/05/2020    HGB 11.5 (L) 03/05/2020    HCT 34.2 (L) 03/05/2020    MCV 75.3 (L) 03/05/2020     03/05/2020     Lab Results   Component Value Date     03/05/2020    K 4.9 03/05/2020    K 5.2 02/12/2020     03/05/2020    CO2 23 03/05/2020    BUN 26 03/05/2020    CREATININE 1.18 03/05/2020    GLUCOSE 185 03/05/2020    GLUCOSE 279 01/06/2020    CALCIUM 9.2 03/05/2020      Lab Results   Component Value Date    LABALBU 3.2 (L) 03/05/2020     Lab Results   Component Value Date    SEDRATE 35 (H) 03/05/2020     Lab Results   Component Value Date    CRP 11.7 (H) 05/25/2019     Lab Results   Component Value Date    LABA1C 8.0 (H) 03/05/2020       MICROBIOLOGY:     Blood Culture: NGTD   Tissue culture- Corynebacterium, Staphylococcus       IMAGING:   Left foot: \"  FINDINGS:       No acute fracture or dislocation. No osseous erosions. Joint spaces are preserved. Small plantar calcaneal enthesophyte. Vascular calcifications noted. Mild soft tissue swelling of the foot.      \"          Monserrat Castle DPM PGY-2  Podiatric Surgery Resident  Podiatry On Call Pager: [phone]  March 7, 2020  2:38 PM

## 2020-03-07 NOTE — PROGRESS NOTES
Jeanette Godfrey is a 60 y. o.female patient. Diabetic foot infection left foot        Culture, Tissue [[id number]] (Abnormal) Collected: 03/05/20 1931   Order Status: Completed Specimen: Tissue Updated: 03/07/20 1038    Gram Stain Result No WBC's   Few epithelial cells   Many Small Gram positive rods   Few Gram positive cocci   Abnormal     Anaerobic Culture Culture in progress    Organism Staphylococcus speciesAbnormal     WOUND/ABSCESS --    Heavy growth   ID and sensitivity to follow     Organism Corynebacterium speciesAbnormal     WOUND/ABSCESS --    Heavy growth   No further workup                     HISTORY:  Redness. Cellulitis. Wound of the fourth webspace.       COMPARISON : Foot radiographs from March 4, 2020       TECHNIQUE: Multiplanar multisequence MRI of the left foot was performed without and with contrast.       FINDINGS:       Achilles tendon is intact. There is thickening of the medial cord of the plantar fascia measuring up to 8 mm in thickness. No adjacent soft tissue edema. Bone marrow signal is within normal limits. No evidence for possible myelitis. Flexor and extensor    tendons appear intact. Mild edema is present along the lateral aspect of the dorsum of the foot. No loculated subcutaneous soft tissue fluid collection. No enhancing mass or pathologic enhancement.           Impression       No evidence for osteomyelitis or soft tissue abscess.       Mild subcutaneous soft tissue edema along the dorsum of the foot.          Current Facility-Administered Medications   Medication Dose Route Frequency Provider Last Rate Last Dose    sodium chloride flush 0.9 % injection 10 mL  10 mL Intravenous BID Staci Bangura DO   10 mL at 03/07/20 0927    doxycycline hyclate (VIBRAMYCIN) capsule 100 mg  100 mg Oral 2 times per day Staci Bangura DO   100 mg at 03/07/20 0925    mupirocin (BACTROBAN) 2 % ointment   Topical Daily Paloma Oconnor DPM        HYDROcodone-acetaminophen (NORCO) (NEURONTIN) capsule 600 mg  600 mg Oral Daily Dago Mclean MD   600 mg at 03/07/20 1432    hydrOXYzine (VISTARIL) capsule 50 mg  50 mg Oral TID PRN Dago Mclean MD        insulin glargine (LANTUS) injection vial 35 Units  35 Units Subcutaneous Nightly Dago Mclean MD   35 Units at 03/06/20 2152    levothyroxine (SYNTHROID) tablet 50 mcg  50 mcg Oral Daily Dago Mclean MD   50 mcg at 03/07/20 2952    metoprolol tartrate (LOPRESSOR) tablet 50 mg  50 mg Oral Daily Dago Mclean MD   50 mg at 03/07/20 1817    sertraline (ZOLOFT) tablet 200 mg  200 mg Oral Daily Dago Mclean MD   200 mg at 03/07/20 6614       Allergies   Allergen Reactions    Ambien [Zolpidem Tartrate]     Capoten [Captopril]     Clioquinol     Cogentin [Benztropine]     Depakote [Divalproex Sodium]     Effexor Xr [Venlafaxine Hcl Er]     Geodon [Ziprasidone Hcl]     Lyrica [Pregabalin]     Navane [Thiothixene]     Pamelor [Nortriptyline Hcl]     Remeron [Mirtazapine]     Risperdal [Risperidone]     Trazodone And Nefazodone     Wellbutrin [Bupropion]      Patient Active Problem List    Diagnosis Date Noted    Cellulitis 03/04/2020    PAD (peripheral artery disease) ([facility] [address] 02/27/2020    Chest pain 02/11/2020    Neuropathy due to secondary diabetes ([facility] [address] 02/07/2020    JESICA (obstructive sleep apnea)     Athscl native arteries of left leg w ulceration oth prt foot ([facility] [address] 09/27/2019    Diabetic ulcer of right foot with bone involvement without evidence of necrosis ([facility] [address] 09/06/2019    Rectal bleeding     Surgical wound dehiscence, initial encounter 07/26/2019    S/P amputation of lesser toe, right ([address] 07/26/2019    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     Skin infection 05/25/2019    Acute osteomyelitis ([facility] [address] 05/14/2019    Atherosclerotic PVD with intermittent claudication ([address] 04/30/2019    Non-pressure ulcer of toe ([facility] 04/23/2019    Type 2 diabetes mellitus with hyperglycemia, with long-term current use of insulin ([facility] 02/25/2019    HTN (hypertension) 02/25/2019    HLD (hyperlipidemia) 02/25/2019    Hypothyroid 02/25/2019    HF (heart failure) ([facility] 02/25/2019    Severe major depression with psychotic features ([facility] 02/22/2019       BP (!) 160/70   Pulse 68   Temp 97.5 °F (36.4 °C) (Oral)   Resp 18   Ht 5' 5\" (1.651 m)   Wt 237 lb (107.5 kg)   SpO2 99%   BMI 39.44 kg/m²     Review of Systems   Gastrointestinal: Negative. Musculoskeletal:        Pain in left foot       Physical Exam   Cardiovascular: Normal heart sounds. No murmur heard. Pulmonary/Chest: Effort normal. No respiratory distress. She has no wheezes. She has no rales. She exhibits no tenderness. Abdominal: Soft. Bowel sounds are normal. She exhibits no distension. There is no abdominal tenderness. There is no rebound and no guarding. Musculoskeletal:         General: Tenderness and edema present.       Comments: Left foot shows inflammation drainage between fourth and fifth toes swelling of the dorsum of the foot redness tenderness       Assessment/Plan:  Diabetic foot infection left foot    MRI does not show any osteomyelitis  Currently on doxycycline    Given extreme pain   Continued inflammation  Growth of Staphylococcus not yet identified from the drainage  switch back to vancomycin and Zosyn

## 2020-03-08 LAB
GLUCOSE BLD-MCNC: 149 MG/DL (ref 60–115)
GLUCOSE BLD-MCNC: 154 MG/DL (ref 60–115)
GLUCOSE BLD-MCNC: 199 MG/DL (ref 60–115)
GLUCOSE BLD-MCNC: 240 MG/DL (ref 60–115)
GLUCOSE BLD-MCNC: 66 MG/DL (ref 60–115)
PERFORMED ON: ABNORMAL
PERFORMED ON: NORMAL

## 2020-03-08 PROCEDURE — 2580000003 HC RX 258: Performed by: INTERNAL MEDICINE

## 2020-03-08 PROCEDURE — 99232 SBSQ HOSP IP/OBS MODERATE 35: CPT | Performed by: INTERNAL MEDICINE

## 2020-03-08 PROCEDURE — 6360000002 HC RX W HCPCS: Performed by: INTERNAL MEDICINE

## 2020-03-08 PROCEDURE — 1210000000 HC MED SURG R&B

## 2020-03-08 PROCEDURE — 6370000000 HC RX 637 (ALT 250 FOR IP): Performed by: INTERNAL MEDICINE

## 2020-03-08 RX ADMIN — METOPROLOL TARTRATE 50 MG: 50 TABLET, FILM COATED ORAL at 08:31

## 2020-03-08 RX ADMIN — HYDROCODONE BITARTRATE AND ACETAMINOPHEN 1 TABLET: 5; 325 TABLET ORAL at 08:34

## 2020-03-08 RX ADMIN — LEVOTHYROXINE SODIUM 50 MCG: 0.05 TABLET ORAL at 06:47

## 2020-03-08 RX ADMIN — MUPIROCIN: 20 OINTMENT TOPICAL at 13:04

## 2020-03-08 RX ADMIN — VANCOMYCIN HYDROCHLORIDE 1000 MG: 1 INJECTION, POWDER, LYOPHILIZED, FOR SOLUTION INTRAVENOUS at 03:46

## 2020-03-08 RX ADMIN — ASPIRIN 81 MG 81 MG: 81 TABLET ORAL at 08:31

## 2020-03-08 RX ADMIN — SERTRALINE 200 MG: 100 TABLET, FILM COATED ORAL at 08:31

## 2020-03-08 RX ADMIN — PIPERACILLIN AND TAZOBACTAM 3.38 G: 3; .375 INJECTION, POWDER, FOR SOLUTION INTRAVENOUS at 17:03

## 2020-03-08 RX ADMIN — FOLIC ACID 1 MG: 1 TABLET ORAL at 08:31

## 2020-03-08 RX ADMIN — ENOXAPARIN SODIUM 40 MG: 40 INJECTION SUBCUTANEOUS at 08:30

## 2020-03-08 RX ADMIN — HYDROCODONE BITARTRATE AND ACETAMINOPHEN 1 TABLET: 5; 325 TABLET ORAL at 15:16

## 2020-03-08 RX ADMIN — Medication 10 ML: at 21:32

## 2020-03-08 RX ADMIN — PIPERACILLIN AND TAZOBACTAM 3.38 G: 3; .375 INJECTION, POWDER, FOR SOLUTION INTRAVENOUS at 08:30

## 2020-03-08 RX ADMIN — HYDROCODONE BITARTRATE AND ACETAMINOPHEN 1 TABLET: 5; 325 TABLET ORAL at 03:46

## 2020-03-08 RX ADMIN — Medication 10 ML: at 08:34

## 2020-03-08 RX ADMIN — ATORVASTATIN CALCIUM 40 MG: 40 TABLET, FILM COATED ORAL at 21:28

## 2020-03-08 RX ADMIN — GABAPENTIN 600 MG: 300 CAPSULE ORAL at 08:31

## 2020-03-08 RX ADMIN — HYDROCODONE BITARTRATE AND ACETAMINOPHEN 1 TABLET: 5; 325 TABLET ORAL at 21:28

## 2020-03-08 RX ADMIN — INSULIN GLARGINE 35 UNITS: 100 INJECTION, SOLUTION SUBCUTANEOUS at 21:27

## 2020-03-08 RX ADMIN — CLOPIDOGREL BISULFATE 75 MG: 75 TABLET ORAL at 08:31

## 2020-03-08 RX ADMIN — VANCOMYCIN HYDROCHLORIDE 1000 MG: 1 INJECTION, POWDER, LYOPHILIZED, FOR SOLUTION INTRAVENOUS at 15:41

## 2020-03-08 ASSESSMENT — ENCOUNTER SYMPTOMS: GASTROINTESTINAL NEGATIVE: 1

## 2020-03-08 ASSESSMENT — PAIN SCALES - GENERAL
PAINLEVEL_OUTOF10: 10
PAINLEVEL_OUTOF10: 10
PAINLEVEL_OUTOF10: 0
PAINLEVEL_OUTOF10: 10
PAINLEVEL_OUTOF10: 10
PAINLEVEL_OUTOF10: 9

## 2020-03-08 NOTE — PROGRESS NOTES
Hospitalist Progress Note      PCP: Jeremy Amin    Date of Admission: 3/4/2020    Chief Complaint:  No acute events, afebrile, stable HD,     Medications:  Reviewed    Infusion Medications    dextrose       Scheduled Medications    piperacillin-tazobactam  3.375 g Intravenous Q8H    vancomycin  1,000 mg Intravenous Q12H    sodium chloride flush  10 mL Intravenous BID    mupirocin   Topical Daily    sodium chloride flush  10 mL Intravenous 2 times per day    enoxaparin  40 mg Subcutaneous Daily    insulin lispro  0-6 Units Subcutaneous TID WC    insulin lispro  0-3 Units Subcutaneous Nightly    aspirin  81 mg Oral Daily    atorvastatin  40 mg Oral Nightly    folic acid  1 mg Oral Daily    clopidogrel  75 mg Oral Daily    gabapentin  600 mg Oral Daily    insulin glargine  35 Units Subcutaneous Nightly    levothyroxine  50 mcg Oral Daily    metoprolol tartrate  50 mg Oral Daily    sertraline  200 mg Oral Daily     PRN Meds: HYDROcodone 5 mg - acetaminophen, HYDROmorphone, sodium chloride flush, acetaminophen **OR** acetaminophen, polyethylene glycol, glucose, dextrose, glucagon (rDNA), dextrose, ondansetron, ALPRAZolam, hydrOXYzine      Intake/Output Summary (Last 24 hours) at 3/8/2020 1107  Last data filed at 3/8/2020 0918  Gross per 24 hour   Intake 1150 ml   Output --   Net 1150 ml       Exam:    /71   Pulse 61   Temp 97.9 °F (36.6 °C) (Oral)   Resp 18   Ht 5' 5\" (1.651 m)   Wt 237 lb (107.5 kg)   SpO2 99%   BMI 39.44 kg/m²     General appearance: awake and alert, cooperative,   Lungs: CTA bilaterally, no wheezes, rales or rhonchi   Heart: RRR, normal S1 and S2 . Abdomen: soft, non-tender, active BS. Extremities: Mild edema of the feet and toes bilaterally, with erythema of the left foot with severely macerated 4th webspace with open wound with purulent drainage, s/p right toe amputation    Labs:   No results for input(s): WBC, HGB, HCT, PLT in the last 72 hours.   No results presented with:     Left 4th interspace wound infection / cellulitis  - on IV Zosyn and Vanco  - wound cultures growing MRSA  - MRI of the left foot was negative for osteomyelitis  - ID and podiatry following     DM with hyperglycemia  - ISS, lantus, hypoglycemia protocol     PVD s/p recent angiogram of LLE with SFA, popliteal artery, tibial-peroneal trunk and peroneal atherectomy   - no further vascular intervention planned  - continue home meds     CKD3  - stable renal function    Disposition - home when OK with ID        Electronically signed by Con Finney MD on 3/8/2020 at 11:07 AM

## 2020-03-08 NOTE — PROGRESS NOTES
Benjamin Hopkins is a 60 y. o.female patient. Diabetic foot infection left foot  MRSA      Culture, Tissue [[id number]] (Abnormal) Collected: 03/05/20 1931   Order Status: Completed Specimen: Tissue Updated: 03/07/20 1038    Gram Stain Result No WBC's   Few epithelial cells   Many Small Gram positive rods   Few Gram positive cocci   Abnormal     Anaerobic Culture Culture in progress    Organism Staphylococcus speciesAbnormal     WOUND/ABSCESS --    Heavy growth   ID and sensitivity to follow     Organism Corynebacterium speciesAbnormal     WOUND/ABSCESS --    Heavy growth   No further workup                     HISTORY:  Redness. Cellulitis. Wound of the fourth webspace.       COMPARISON : Foot radiographs from March 4, 2020       TECHNIQUE: Multiplanar multisequence MRI of the left foot was performed without and with contrast.       FINDINGS:       Achilles tendon is intact. There is thickening of the medial cord of the plantar fascia measuring up to 8 mm in thickness. No adjacent soft tissue edema. Bone marrow signal is within normal limits. No evidence for possible myelitis. Flexor and extensor    tendons appear intact. Mild edema is present along the lateral aspect of the dorsum of the foot. No loculated subcutaneous soft tissue fluid collection. No enhancing mass or pathologic enhancement.           Impression       No evidence for osteomyelitis or soft tissue abscess.       Mild subcutaneous soft tissue edema along the dorsum of the foot.              Allergies   Allergen Reactions    Ambien [Zolpidem Tartrate]     Capoten [Captopril]     Clioquinol     Cogentin [Benztropine]     Depakote [Divalproex Sodium]     Effexor Xr [Venlafaxine Hcl Er]     Geodon [Ziprasidone Hcl]     Lyrica [Pregabalin]     Navane [Thiothixene]     Pamelor [Nortriptyline Hcl]     Remeron [Mirtazapine]     Risperdal [Risperidone]     Trazodone And Nefazodone     Wellbutrin [Bupropion]      Patient Active Problem foot    MRI does not show any osteomyelitis  Currently on doxycycline    Given extreme pain   Continued inflammation  Growth of MRSA   vancomycin and Zosyn

## 2020-03-08 NOTE — FLOWSHEET NOTE
0830  Morning medications administered at this time. Patient is awake, alert, and oriented times 4 and is in the high fowlers position in the bed. Patient blood glucose was 66 and 2 containers of orange juice given. Patient denies any chest pain, shortness of breath, dizziness, weakness, abdominal pain, nausea, or vomiting at this time. Patient bilateral foot wounds are clean, dry, and intact. Patient states 10/10 pain in her foot. Patient is Nepali speaking but understands some english. Patient is still in contact isolation for MRSA in the wound. Patient denies further needs at this time. 1515  Medicated with Molena for 10/10 pain. Patient is tearful at this time and is rolling around in the bed on her back.

## 2020-03-09 VITALS
RESPIRATION RATE: 20 BRPM | HEIGHT: 65 IN | BODY MASS INDEX: 39.49 KG/M2 | HEART RATE: 69 BPM | TEMPERATURE: 98.4 F | SYSTOLIC BLOOD PRESSURE: 129 MMHG | WEIGHT: 237 LBS | OXYGEN SATURATION: 95 % | DIASTOLIC BLOOD PRESSURE: 87 MMHG

## 2020-03-09 LAB
ANAEROBIC CULTURE: ABNORMAL
BLOOD CULTURE, ROUTINE: NORMAL
BLOOD CULTURE, ROUTINE: NORMAL
GLUCOSE BLD-MCNC: 109 MG/DL (ref 60–115)
GLUCOSE BLD-MCNC: 162 MG/DL (ref 60–115)
GLUCOSE BLD-MCNC: 175 MG/DL (ref 60–115)
GLUCOSE BLD-MCNC: 195 MG/DL (ref 60–115)
GRAM STAIN RESULT: ABNORMAL
ORGANISM: ABNORMAL
ORGANISM: ABNORMAL
PERFORMED ON: ABNORMAL
PERFORMED ON: NORMAL
WOUND/ABSCESS: ABNORMAL
WOUND/ABSCESS: ABNORMAL

## 2020-03-09 PROCEDURE — 6370000000 HC RX 637 (ALT 250 FOR IP): Performed by: INTERNAL MEDICINE

## 2020-03-09 PROCEDURE — 2580000003 HC RX 258: Performed by: INTERNAL MEDICINE

## 2020-03-09 PROCEDURE — 6360000002 HC RX W HCPCS: Performed by: INTERNAL MEDICINE

## 2020-03-09 PROCEDURE — 1210000000 HC MED SURG R&B

## 2020-03-09 PROCEDURE — 97116 GAIT TRAINING THERAPY: CPT

## 2020-03-09 PROCEDURE — 99232 SBSQ HOSP IP/OBS MODERATE 35: CPT | Performed by: INTERNAL MEDICINE

## 2020-03-09 RX ADMIN — INSULIN GLARGINE 35 UNITS: 100 INJECTION, SOLUTION SUBCUTANEOUS at 21:18

## 2020-03-09 RX ADMIN — Medication 10 ML: at 20:21

## 2020-03-09 RX ADMIN — Medication 10 ML: at 08:51

## 2020-03-09 RX ADMIN — INSULIN LISPRO 1 UNITS: 100 INJECTION, SOLUTION INTRAVENOUS; SUBCUTANEOUS at 17:54

## 2020-03-09 RX ADMIN — ENOXAPARIN SODIUM 40 MG: 40 INJECTION SUBCUTANEOUS at 08:54

## 2020-03-09 RX ADMIN — HYDROCODONE BITARTRATE AND ACETAMINOPHEN 1 TABLET: 5; 325 TABLET ORAL at 13:26

## 2020-03-09 RX ADMIN — SERTRALINE 200 MG: 100 TABLET, FILM COATED ORAL at 08:52

## 2020-03-09 RX ADMIN — HYDROCODONE BITARTRATE AND ACETAMINOPHEN 1 TABLET: 5; 325 TABLET ORAL at 06:45

## 2020-03-09 RX ADMIN — GABAPENTIN 600 MG: 300 CAPSULE ORAL at 08:52

## 2020-03-09 RX ADMIN — INSULIN LISPRO 1 UNITS: 100 INJECTION, SOLUTION INTRAVENOUS; SUBCUTANEOUS at 13:04

## 2020-03-09 RX ADMIN — PIPERACILLIN AND TAZOBACTAM 3.38 G: 3; .375 INJECTION, POWDER, FOR SOLUTION INTRAVENOUS at 08:50

## 2020-03-09 RX ADMIN — CLOPIDOGREL BISULFATE 75 MG: 75 TABLET ORAL at 08:52

## 2020-03-09 RX ADMIN — Medication 10 ML: at 01:15

## 2020-03-09 RX ADMIN — Medication 10 ML: at 06:48

## 2020-03-09 RX ADMIN — ATORVASTATIN CALCIUM 40 MG: 40 TABLET, FILM COATED ORAL at 20:21

## 2020-03-09 RX ADMIN — VANCOMYCIN HYDROCHLORIDE 1000 MG: 1 INJECTION, POWDER, LYOPHILIZED, FOR SOLUTION INTRAVENOUS at 20:21

## 2020-03-09 RX ADMIN — VANCOMYCIN HYDROCHLORIDE 1000 MG: 1 INJECTION, POWDER, LYOPHILIZED, FOR SOLUTION INTRAVENOUS at 06:48

## 2020-03-09 RX ADMIN — ASPIRIN 81 MG 81 MG: 81 TABLET ORAL at 08:52

## 2020-03-09 RX ADMIN — FOLIC ACID 1 MG: 1 TABLET ORAL at 08:52

## 2020-03-09 RX ADMIN — PIPERACILLIN AND TAZOBACTAM 3.38 G: 3; .375 INJECTION, POWDER, FOR SOLUTION INTRAVENOUS at 15:17

## 2020-03-09 RX ADMIN — METOPROLOL TARTRATE 50 MG: 50 TABLET, FILM COATED ORAL at 08:52

## 2020-03-09 RX ADMIN — LEVOTHYROXINE SODIUM 50 MCG: 0.05 TABLET ORAL at 06:45

## 2020-03-09 RX ADMIN — HYDROCODONE BITARTRATE AND ACETAMINOPHEN 1 TABLET: 5; 325 TABLET ORAL at 20:21

## 2020-03-09 RX ADMIN — PIPERACILLIN AND TAZOBACTAM 3.38 G: 3; .375 INJECTION, POWDER, FOR SOLUTION INTRAVENOUS at 01:21

## 2020-03-09 ASSESSMENT — PAIN SCALES - GENERAL
PAINLEVEL_OUTOF10: 6
PAINLEVEL_OUTOF10: 6
PAINLEVEL_OUTOF10: 10
PAINLEVEL_OUTOF10: 9
PAINLEVEL_OUTOF10: 9
PAINLEVEL_OUTOF10: 6
PAINLEVEL_OUTOF10: 10
PAINLEVEL_OUTOF10: 10
PAINLEVEL_OUTOF10: 6

## 2020-03-09 ASSESSMENT — PAIN DESCRIPTION - DESCRIPTORS
DESCRIPTORS: CONSTANT
DESCRIPTORS: CONSTANT
DESCRIPTORS: CONSTANT;ACHING

## 2020-03-09 ASSESSMENT — PAIN DESCRIPTION - PAIN TYPE
TYPE: ACUTE PAIN

## 2020-03-09 ASSESSMENT — PAIN DESCRIPTION - LOCATION
LOCATION: FOOT

## 2020-03-09 ASSESSMENT — PAIN DESCRIPTION - ORIENTATION
ORIENTATION: LEFT

## 2020-03-09 ASSESSMENT — PAIN SCALES - WONG BAKER: WONGBAKER_NUMERICALRESPONSE: 0

## 2020-03-09 NOTE — PROGRESS NOTES
Hospitalist Progress Note      PCP: Finesse Waggoner    Date of Admission: 3/4/2020    Chief Complaint:  No acute events, afebrile, stable HD,     Medications:  Reviewed    Infusion Medications    dextrose       Scheduled Medications    piperacillin-tazobactam  3.375 g Intravenous Q8H    vancomycin  1,000 mg Intravenous Q12H    sodium chloride flush  10 mL Intravenous BID    mupirocin   Topical Daily    sodium chloride flush  10 mL Intravenous 2 times per day    enoxaparin  40 mg Subcutaneous Daily    insulin lispro  0-6 Units Subcutaneous TID WC    insulin lispro  0-3 Units Subcutaneous Nightly    aspirin  81 mg Oral Daily    atorvastatin  40 mg Oral Nightly    folic acid  1 mg Oral Daily    clopidogrel  75 mg Oral Daily    gabapentin  600 mg Oral Daily    insulin glargine  35 Units Subcutaneous Nightly    levothyroxine  50 mcg Oral Daily    metoprolol tartrate  50 mg Oral Daily    sertraline  200 mg Oral Daily     PRN Meds: HYDROcodone 5 mg - acetaminophen, HYDROmorphone, sodium chloride flush, acetaminophen **OR** acetaminophen, polyethylene glycol, glucose, dextrose, glucagon (rDNA), dextrose, ondansetron, ALPRAZolam, hydrOXYzine      Intake/Output Summary (Last 24 hours) at 3/9/2020 1418  Last data filed at 3/9/2020 0754  Gross per 24 hour   Intake 810 ml   Output --   Net 810 ml       Exam:    BP (!) 147/58   Pulse 71   Temp 98.2 °F (36.8 °C) (Oral)   Resp 20   Ht 5' 5\" (1.651 m)   Wt 237 lb (107.5 kg)   SpO2 95%   BMI 39.44 kg/m²     General appearance: awake and alert, cooperative,   Lungs: CTA bilaterally, no wheezes, rales or rhonchi   Heart: RRR, normal S1 and S2 . Abdomen: soft, non-tender, active BS. Extremities: Mild edema of the feet and toes bilaterally, with erythema of the left foot with severely macerated 4th webspace with open wound with purulent drainage, s/p right toe amputation    Labs:   No results for input(s): WBC, HGB, HCT, PLT in the last 72 hours.   No results presented with:     Left 4th interspace wound infection / cellulitis  - on IV Zosyn and Vanco  - wound cultures growing MRSA, corynebacterium  - MRI of the left foot was negative for osteomyelitis  - ID and podiatry following     DM with hyperglycemia  - ISS, lantus, hypoglycemia protocol     PVD s/p recent angiogram of LLE with SFA, popliteal artery, tibial-peroneal trunk and peroneal atherectomy   - no further vascular intervention planned  - continue home meds     CKD3  - stable renal function    Disposition - Continuing to follow culture sensitivities and awaiting ID recs for homegoing abx. . Possible discharge in next 24-48 hours.         Electronically signed by Cheko Jasmine DO on 3/9/2020 at 2:18 PM

## 2020-03-09 NOTE — PROGRESS NOTES
Physical Therapy Med Surg Daily Treatment Note  Facility/Department: [facility]  Room: [facility]/S931-82       NAME: Sky Franz  : 1955 (60 y.o.)  MRN: [id number]  CODE STATUS: Full Code    Date of Service: 3/9/2020    Patient Diagnosis(es): Cellulitis [O21.35]   Chief Complaint   Patient presents with    Other     missed wound clinic appointment today and refused to see NP. pt has been by clinic before     Patient Active Problem List    Diagnosis Date Noted    Cellulitis 2020    PAD (peripheral artery disease) ([facility] [address] 2020    Chest pain 2020    Neuropathy due to secondary diabetes ([facility] 2020    JESICA (obstructive sleep apnea)     Athscl native arteries of left leg w ulceration oth prt foot ([facility] 2019    Diabetic ulcer of right foot with bone involvement without evidence of necrosis ([facility] [address] 2019    Rectal bleeding     Surgical wound dehiscence, initial encounter 2019    S/P amputation of lesser toe, right ([address] 2019    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     Skin infection 2019    Acute osteomyelitis ([facility] [facility] 2019    Atherosclerotic PVD with intermittent claudication ([facility] [address] 2019    Non-pressure ulcer of toe ([facility] [address] 2019    Type 2 diabetes mellitus with hyperglycemia, with long-term current use of insulin ([facility] [address] 2019    HTN (hypertension) 2019    HLD (hyperlipidemia) 2019    Hypothyroid 2019    HF (heart failure) ([facility] [address] 2019    Severe major depression with psychotic features ([facility] [address] 2019        Past Medical History:   Diagnosis Date    Asthma     CAD (coronary artery disease)     CKD (chronic kidney disease) stage 3, GFR 30-59 ml/min ([facility]     Colitis     Diabetes mellitus ([address]     Hyperlipidemia     Hypertension     PAD (peripheral artery disease) ([facility]     Prolonged emergence from general anesthesia     PVD (peripheral vascular disease) ([address]     Thyroid disease Past Surgical History:   Procedure Laterality Date    CARDIAC SURGERY       SECTION      COLONOSCOPY N/A 2019    COLONOSCOPY DIAGNOSTIC performed by Merry Mendoza MD at [address] [facility]  2019    unknown vessels    HYSTERECTOMY      TOE AMPUTATION Right     3rd toe          Restrictions  Restrictions/Precautions: Fall Risk;Up as Tolerated;Contact Precautions;Weight Bearing(Medium falls; MRSA)  Lower Extremity Weight Bearing Restrictions  Right Lower Extremity Weight Bearing: Weight Bearing As Tolerated(In post op shoes)  Left Lower Extremity Weight Bearing: Weight Bearing As Tolerated(In post op shoes)  Position Activity Restriction  Other position/activity restrictions: B post op shoes    SUBJECTIVE   Subjective  Subjective: I don't want to walk out there. (Pointing to the hallway)  General Comment  Comments: I only speak a little bit of english. Pt was able to understand my Albanian and was agreeable to tx. Pre-Session Pain Report  Pre Treatment Pain Screening  Pain at present: 10  Intervention List: Patient able to continue with treatment  Comments / Details: Recently medicated per RN. Post-Session Pain Report  Pain Assessment  Pain Level: 10  Pain Location: Foot  Pain Orientation: Left  Pain Descriptors: Constant; Aching         OBJECTIVE   Orientation  Overall Orientation Status: Within Functional Limits    Bed mobility  Rolling to Left: Supervision  Rolling to Right: Supervision  Supine to Sit: Supervision  Sit to Supine: Supervision  Comment: HOB slightly elevated. Pt declined laying the bed flat. vc's for technique and hand placement;     Transfers  Sit to Stand: Supervision  Stand to sit: Supervision  Comment: Pt attempted to don post op shoe but required assistance; vc's for technique and hand placement; good follow through.      Ambulation  WB Status: WBAT in post op shoes  Ambulation 1  Surface: level tile  Device: Rolling

## 2020-03-09 NOTE — PROGRESS NOTES
Results   Component Value Date    CRP 11.7 (H) 05/25/2019     Lab Results   Component Value Date    LABA1C 8.0 (H) 03/05/2020       MICROBIOLOGY:     Blood Culture: NGTD   Tissue culture- Corynebacterium, Staphylococcus       IMAGING:   Left foot:   FINDINGS:       No acute fracture or dislocation. No osseous erosions. Joint spaces are preserved. Small plantar calcaneal enthesophyte. Vascular calcifications noted. Mild soft tissue swelling of the foot.              Carline Mcfadden DPM PGY-1  Podiatric Surgery Resident  Podiatry On Call Pager: [phone]  March 9, 2020  5:59 PM

## 2020-03-10 LAB
ANION GAP SERPL CALCULATED.3IONS-SCNC: 14 MEQ/L (ref 9–15)
BUN BLDV-MCNC: 13 MG/DL (ref 8–23)
CALCIUM SERPL-MCNC: 8.9 MG/DL (ref 8.5–9.9)
CHLORIDE BLD-SCNC: 104 MEQ/L (ref 95–107)
CO2: 21 MEQ/L (ref 20–31)
CREAT SERPL-MCNC: 1.08 MG/DL (ref 0.5–0.9)
GFR AFRICAN AMERICAN: >60
GFR NON-AFRICAN AMERICAN: 51.4
GLUCOSE BLD-MCNC: 106 MG/DL (ref 70–99)
GLUCOSE BLD-MCNC: 194 MG/DL (ref 60–115)
GLUCOSE BLD-MCNC: 295 MG/DL (ref 60–115)
GLUCOSE BLD-MCNC: 91 MG/DL (ref 60–115)
PERFORMED ON: ABNORMAL
PERFORMED ON: ABNORMAL
PERFORMED ON: NORMAL
POTASSIUM REFLEX MAGNESIUM: 4.4 MEQ/L (ref 3.4–4.9)
SODIUM BLD-SCNC: 139 MEQ/L (ref 135–144)

## 2020-03-10 PROCEDURE — 99232 SBSQ HOSP IP/OBS MODERATE 35: CPT | Performed by: INTERNAL MEDICINE

## 2020-03-10 PROCEDURE — 97116 GAIT TRAINING THERAPY: CPT

## 2020-03-10 PROCEDURE — 6360000002 HC RX W HCPCS: Performed by: INTERNAL MEDICINE

## 2020-03-10 PROCEDURE — 80048 BASIC METABOLIC PNL TOTAL CA: CPT

## 2020-03-10 PROCEDURE — 2580000003 HC RX 258: Performed by: INTERNAL MEDICINE

## 2020-03-10 PROCEDURE — 6370000000 HC RX 637 (ALT 250 FOR IP): Performed by: INTERNAL MEDICINE

## 2020-03-10 PROCEDURE — 36415 COLL VENOUS BLD VENIPUNCTURE: CPT

## 2020-03-10 RX ADMIN — VANCOMYCIN HYDROCHLORIDE 1000 MG: 1 INJECTION, POWDER, LYOPHILIZED, FOR SOLUTION INTRAVENOUS at 08:36

## 2020-03-10 RX ADMIN — HYDROCODONE BITARTRATE AND ACETAMINOPHEN 1 TABLET: 5; 325 TABLET ORAL at 04:38

## 2020-03-10 RX ADMIN — Medication 10 ML: at 08:46

## 2020-03-10 RX ADMIN — Medication 10 ML: at 00:07

## 2020-03-10 RX ADMIN — ASPIRIN 81 MG 81 MG: 81 TABLET ORAL at 08:36

## 2020-03-10 RX ADMIN — HYDROCODONE BITARTRATE AND ACETAMINOPHEN 1 TABLET: 5; 325 TABLET ORAL at 08:45

## 2020-03-10 RX ADMIN — Medication 10 ML: at 08:45

## 2020-03-10 RX ADMIN — PIPERACILLIN AND TAZOBACTAM 3.38 G: 3; .375 INJECTION, POWDER, FOR SOLUTION INTRAVENOUS at 09:58

## 2020-03-10 RX ADMIN — INSULIN LISPRO 1 UNITS: 100 INJECTION, SOLUTION INTRAVENOUS; SUBCUTANEOUS at 11:55

## 2020-03-10 RX ADMIN — ENOXAPARIN SODIUM 40 MG: 40 INJECTION SUBCUTANEOUS at 08:36

## 2020-03-10 RX ADMIN — PIPERACILLIN AND TAZOBACTAM 3.38 G: 3; .375 INJECTION, POWDER, FOR SOLUTION INTRAVENOUS at 00:07

## 2020-03-10 RX ADMIN — FOLIC ACID 1 MG: 1 TABLET ORAL at 08:36

## 2020-03-10 RX ADMIN — CLOPIDOGREL BISULFATE 75 MG: 75 TABLET ORAL at 08:36

## 2020-03-10 RX ADMIN — LEVOTHYROXINE SODIUM 50 MCG: 0.05 TABLET ORAL at 08:45

## 2020-03-10 RX ADMIN — GABAPENTIN 600 MG: 300 CAPSULE ORAL at 08:36

## 2020-03-10 RX ADMIN — METOPROLOL TARTRATE 50 MG: 50 TABLET, FILM COATED ORAL at 08:36

## 2020-03-10 ASSESSMENT — PAIN SCALES - GENERAL
PAINLEVEL_OUTOF10: 10
PAINLEVEL_OUTOF10: 0
PAINLEVEL_OUTOF10: 0
PAINLEVEL_OUTOF10: 10
PAINLEVEL_OUTOF10: 0
PAINLEVEL_OUTOF10: 0
PAINLEVEL_OUTOF10: 10
PAINLEVEL_OUTOF10: 10

## 2020-03-10 ASSESSMENT — PAIN DESCRIPTION - ORIENTATION: ORIENTATION: LEFT

## 2020-03-10 ASSESSMENT — PAIN DESCRIPTION - DESCRIPTORS: DESCRIPTORS: ACHING

## 2020-03-10 ASSESSMENT — PAIN DESCRIPTION - PAIN TYPE: TYPE: ACUTE PAIN

## 2020-03-10 ASSESSMENT — PAIN DESCRIPTION - LOCATION: LOCATION: FOOT

## 2020-03-10 NOTE — PROGRESS NOTES
Ryanne Marking  1955  female  Medical Record Number: [id number]      MRI pending L foot shows no OM  Placed on IV abx over the weekend due to the pain. Tissue cultures with MRA and Corynebacterium, no anaerobes but patient does have hx of pseudomonas infection back in September. Was previously on doxycycline.    Resting    Past Medical History:   Diagnosis Date    Asthma     CAD (coronary artery disease)     CKD (chronic kidney disease) stage 3, GFR 30-59 ml/min ([facility])     Colitis     Diabetes mellitus ([facility]     Hyperlipidemia     Hypertension     PAD (peripheral artery disease) ([facility])     Prolonged emergence from general anesthesia     PVD (peripheral vascular disease) ([facility]     Thyroid disease        Past Surgical History:   Procedure Laterality Date    CARDIAC SURGERY       SECTION      COLONOSCOPY N/A 2019    COLONOSCOPY DIAGNOSTIC performed by Carol Rubio MD at [address] [place] [address] GRAFT  2019    unknown vessels    HYSTERECTOMY      TOE AMPUTATION Right     3rd toe       Social History     Socioeconomic History    Marital status:      Spouse name: Not on file    Number of children: Not on file    Years of education: Not on file    Highest education level: Not on file   Occupational History    Not on file   Social Needs    Financial resource strain: Not on file    Food insecurity     Worry: Not on file     Inability: Not on file    Transportation needs     Medical: Not on file     Non-medical: Not on file   Tobacco Use    Smoking status: Never Smoker    Smokeless tobacco: Never Used   Substance and Sexual Activity    Alcohol use: Never     Frequency: Never    Drug use: Never    Sexual activity: Not on file   Lifestyle    Physical activity     Days per week: Not on file     Minutes per session: Not on file    Stress: Not on file   Relationships    Social connections     Talks on phone: Not on file     Gets together: Not acid (FOLVITE) 1 MG tablet Take 1 mg by mouth daily      Iron Polysacch [id number] (NIFEREX-150 FORTE PO) Take 1 tablet by mouth daily       clopidogrel (PLAVIX) 75 MG tablet Take 75 mg by mouth daily      Cyanocobalamin (VITAMIN B-12) 1000 MCG extended release tablet Take 1,000 mcg by mouth daily      Cholecalciferol (VITAMIN D3) 24375 units CAPS Take 1 capsule by mouth once a week Indications: weekly on Sun       Cholecalciferol (VITAMIN D) 2000 units CAPS capsule Take 2,000 Units by mouth daily       lisinopril (PRINIVIL;ZESTRIL) 10 MG tablet Take 10 mg by mouth daily       insulin lispro (HUMALOG) 100 UNIT/ML injection vial Inject 0-6 Units into the skin 3 times daily (with meals) 1 vial 3    levothyroxine (SYNTHROID) 50 MCG tablet Take 50 mcg by mouth Daily      furosemide (LASIX) 40 MG tablet Take 40 mg by mouth Indications: M-W-F       meclizine (ANTIVERT) 25 MG tablet Take 25 mg by mouth three times daily         Physical Exam:  Resting, arousable  General: Patient appears in no acute distress, cooperative  Skin: no new rashes   HEENT: EOMI, MMM, Neck is supple  Heart: S1 S2  Lungs: bilaterally clear  Abdomen: soft, ND, NTTP, +BS  Extrem:left 4th interspace vascular compromise with pain in the 5th toe L foot and metatarsal area . Also has L heel wound without drainage. Has right foot amputation site small wound. Labs: I have reviewed all lab results by electronic record, including most recent CBC, metabolic panel, and pertinent abnormalities were addressed from an infectious disease perspective. WBC trends are being monitored.     Lab Results   Component Value Date     03/05/2020    K 4.9 03/05/2020    K 5.2 02/12/2020     03/05/2020    CO2 23 03/05/2020    BUN 26 03/05/2020    CREATININE 1.18 03/05/2020    GLUCOSE 185 03/05/2020    GLUCOSE 279 01/06/2020    CALCIUM 9.2 03/05/2020      Lab Results   Component Value Date    WBC 8.0 03/05/2020    HGB 11.5 (L) 03/05/2020    HCT 34.2

## 2020-03-10 NOTE — PROGRESS NOTES
Lori Tamayo  1955  female  Medical Record Number: [id number]      MRI pending L foot shows no OM  Placed on IV abx over the weekend due to the pain. Tissue cultures with MRA and Corynebacterium, no anaerobes but patient does have hx of pseudomonas infection back in September. Was previously on doxycycline.    Resting    Past Medical History:   Diagnosis Date    Asthma     CAD (coronary artery disease)     CKD (chronic kidney disease) stage 3, GFR 30-59 ml/min ([facility])     Colitis     Diabetes mellitus [facility]     Hyperlipidemia     Hypertension     PAD (peripheral artery disease) ([facility])     Prolonged emergence from general anesthesia     PVD (peripheral vascular disease) ([facility]     Thyroid disease        Past Surgical History:   Procedure Laterality Date    CARDIAC SURGERY       SECTION      COLONOSCOPY N/A 2019    COLONOSCOPY DIAGNOSTIC performed by Mahad Fleming MD at [address] [place] [address] GRAFT  2019    unknown vessels    HYSTERECTOMY      TOE AMPUTATION Right     3rd toe       Social History     Socioeconomic History    Marital status:      Spouse name: Not on file    Number of children: Not on file    Years of education: Not on file    Highest education level: Not on file   Occupational History    Not on file   Social Needs    Financial resource strain: Not on file    Food insecurity     Worry: Not on file     Inability: Not on file    Transportation needs     Medical: Not on file     Non-medical: Not on file   Tobacco Use    Smoking status: Never Smoker    Smokeless tobacco: Never Used   Substance and Sexual Activity    Alcohol use: Never     Frequency: Never    Drug use: Never    Sexual activity: Not on file   Lifestyle    Physical activity     Days per week: Not on file     Minutes per session: Not on file    Stress: Not on file   Relationships    Social connections     Talks on phone: Not on file     Gets together: Not on file     Attends Bahai service: Not on file     Active member of club or organization: Not on file     Attends meetings of clubs or organizations: Not on file     Relationship status: Not on file    Intimate partner violence     Fear of current or ex partner: Not on file     Emotionally abused: Not on file     Physically abused: Not on file     Forced sexual activity: Not on file   Other Topics Concern    Not on file   Social History Narrative    Not on file       History reviewed. No pertinent family history. No current facility-administered medications on file prior to encounter. Current Outpatient Medications on File Prior to Encounter   Medication Sig Dispense Refill    ALPRAZolam (XANAX) 1 MG tablet Take 2 mg by mouth nightly as needed for Sleep.  metoprolol tartrate (LOPRESSOR) 50 MG tablet Take 50 mg by mouth daily      gabapentin (NEURONTIN) 600 MG tablet Take 600 mg by mouth daily.  sertraline (ZOLOFT) 50 MG tablet Take 4 tablets by mouth daily      insulin glargine (LANTUS) 100 UNIT/ML injection vial Inject 35 Units into the skin nightly      insulin lispro (HUMALOG) 100 UNIT/ML injection vial Inject 10 Units into the skin 3 times daily (before meals)       hydrOXYzine (VISTARIL) 50 MG capsule Take 50 mg by mouth 3 times daily as needed for Itching       haloperidol (HALDOL) 5 MG tablet Take 5 mg by mouth daily      FreeStyle Lancets MISC 1 each by Does not apply route daily 100 each 3    blood glucose test strips (FREESTYLE LITE) strip 1 each by In Vitro route daily As needed.  100 each 3    Alcohol Swabs (ALCOHOL PREP) 70 % PADS qid (Patient taking differently: Apply 1 each topically 4 times daily qid) 300 each 06    Insulin Pen Needle (NOVOFINE) 32G X 6 MM MISC Qid (Patient taking differently: 1 each 3 times daily Qid) 300 each 3    atorvastatin (LIPITOR) 40 MG tablet Take 40 mg by mouth daily      aspirin 81 MG chewable tablet Take 81 mg by mouth daily      folic MCV 75.3 (L) 03/05/2020     03/05/2020       Radiology:   I have reviewed imaging results per electronic record and most pertinent abnormalities are being addressed from an infectious disease standpoint. Assessment:  PAD  Hx of R 3rd toe OM with R 3rd toe amp site small wound. L 4th webspace wound and infection. DM2  RADHA on CKD  MRSA and corynebacterium on tissue culture. Plan:  Since there is no evidence of OM, will plan to DC on PO Doxycycline, but will call micro lab and make sure that she is sensitive prior to changing. If no sensitivity, then will have to go on outpatient infusions x 2 weeks. Will need to be compliant with abx and wound care.    Severe PAD is part of the problem with the pain and non-healing nature    Staci Bangura D.O.

## 2020-03-10 NOTE — DISCHARGE SUMMARY
Physician Discharge Summary     Patient ID:  Merle Parmar  [id number]  82 y.o.  1955    Admit date: 3/4/2020    Discharge date : 03/10/20     Admitting Physician: Keturah Metz MD     Discharge Physician: Guille Anderson DO     Admission Diagnoses: Cellulitis [L03.90]    Discharge Diagnoses: Cellulitis    Admission Condition: fair    Discharged Condition: good    Hospital Course: 60 y.o. [address] speaking female with PMH of CAD, IDDM, PAD, CKD3 who presented to the ED today complaining of a painful wound on her left heel and pain, swelling, and redness of the digits 4 and 5 on the left foot. Patient noticed the heel ulcer and swollen digits roughly 3 weeks ago, has been undergoing HBO therapy at the wound center, not taking any antibiotics, denies any fever, chills, chest pain, or shortness of breath, XRAy of the left foot was negative for acute findings, fluids, insulin and IV vanco were administered in ED, admitted for further management. Patient was continued on abx for cellulitis related to DM. Wound cultures came back with MRSA. ID was consulted and changed abx to doxycycline. Blood cultures were negative. ID recommended PO Doxycycline and was ok for discharge. Patient set up with [facility] and discharged on 3/10 in stable condition and will follow up with the wound center and PCP after discharge. Consults: ID    Discharge Exam:  General appearance: awake and alert, cooperative,   Lungs: CTA bilaterally, no wheezes, rales or rhonchi   Heart: RRR, normal S1 and S2 . Abdomen: soft, non-tender, active BS. Extremities: Mild edema of the feet and toes bilaterally, with erythema of the left foot with severely macerated 4th webspace with open wound with purulent drainage, s/p right toe amputation    Labs:   No results for input(s): WBC, HGB, HCT, PLT in the last 72 hours.   Recent Labs     03/10/20  0510      K 4.4      CO2 21   BUN 13   CREATININE 1.08*   CALCIUM 8.9     No results for input(s): AST, ALT, BILIDIR, BILITOT, ALKPHOS in the last 72 hours. No results for input(s): INR in the last 72 hours. No results for input(s): Jerardo Eugene in the last 72 hours. Urinalysis:   Lab Results   Component Value Date    NITRU Negative 2019    BLOODU Negative 2019    SPECGRAV 1.018 2019    GLUCOSEU 250 2019       Radiology:   Most recent    Chest CT      WITH CONTRAST:No results found for this or any previous visit. WITHOUT CONTRAST: No results found for this or any previous visit. CXR      2-view:   Results for orders placed during the hospital encounter of 20   XR CHEST STANDARD (2 VW)    Narrative Patient MRN: [id number]    : 1955    Age:  60 years    Gender: Female    Order Date: 2020 11:45 AM.     Exam: XR CHEST (2 VW)    Number of Views: 3     Indication:  Chest pain x4 days. Comparison: None    Findings: Median sternotomy wires. Cardiomediastinal silhouette is within normal limits. Lungs are clear without evidence of infiltrate/pneumonia, pneumothorax or pleural effusion      Impression No radiographic evidence of acute cardiopulmonary disease         Portable: No results found for this or any previous visit. Echo No results found for this or any previous visit. Disposition: home    In process/preliminary results:  Outstanding Order Results     No orders found from 2020 to 3/5/2020. Patient Instructions:   Current Discharge Medication List      START taking these medications    Details   doxycycline hyclate (VIBRAMYCIN) 100 MG capsule Take 1 capsule by mouth every 12 hours for 10 days  Qty: 20 capsule, Refills: 0      HYDROcodone-acetaminophen (NORCO) 5-325 MG per tablet Take 1 tablet by mouth every 4 hours as needed for Pain for up to 7 days.   Qty: 28 tablet, Refills: 0    Comments: Reduce doses taken as pain becomes manageable  Associated Diagnoses: Gangrene of toe of left foot (HCC)      mupirocin (BACTROBAN) 2 % ointment Apply topically 3 times daily. Qty: 1 Tube, Refills: 1         CONTINUE these medications which have NOT CHANGED    Details   ALPRAZolam (XANAX) 1 MG tablet Take 2 mg by mouth nightly as needed for Sleep.      metoprolol tartrate (LOPRESSOR) 50 MG tablet Take 50 mg by mouth daily      gabapentin (NEURONTIN) 600 MG tablet Take 600 mg by mouth daily. sertraline (ZOLOFT) 50 MG tablet Take 4 tablets by mouth daily      insulin glargine (LANTUS) 100 UNIT/ML injection vial Inject 35 Units into the skin nightly      !! insulin lispro (HUMALOG) 100 UNIT/ML injection vial Inject 10 Units into the skin 3 times daily (before meals)       hydrOXYzine (VISTARIL) 50 MG capsule Take 50 mg by mouth 3 times daily as needed for Itching       haloperidol (HALDOL) 5 MG tablet Take 5 mg by mouth daily      FreeStyle Lancets MISC 1 each by Does not apply route daily  Qty: 100 each, Refills: 3      blood glucose test strips (FREESTYLE LITE) strip 1 each by In Vitro route daily As needed. Qty: 100 each, Refills: 3      Alcohol Swabs (ALCOHOL PREP) 70 % PADS qid  Qty: 300 each, Refills: 06      Insulin Pen Needle (NOVOFINE) 32G X 6 MM MISC Qid  Qty: 300 each, Refills: 3      atorvastatin (LIPITOR) 40 MG tablet Take 40 mg by mouth daily      aspirin 81 MG chewable tablet Take 81 mg by mouth daily      folic acid (FOLVITE) 1 MG tablet Take 1 mg by mouth daily      Iron Polysacch Bxyrx-C25-FN (NIFEREX-150 FORTE PO) Take 1 tablet by mouth daily       clopidogrel (PLAVIX) 75 MG tablet Take 75 mg by mouth daily      Cyanocobalamin (VITAMIN B-12) 1000 MCG extended release tablet Take 1,000 mcg by mouth daily      !! Cholecalciferol (VITAMIN D3) 16697 units CAPS Take 1 capsule by mouth once a week Indications: weekly on Sun       !!  Cholecalciferol (VITAMIN D) 2000 units CAPS capsule Take 2,000 Units by mouth daily       lisinopril (PRINIVIL;ZESTRIL) 10 MG tablet Take 10 mg by mouth daily       !! insulin lispro (HUMALOG) 100

## 2020-03-10 NOTE — PROGRESS NOTES
Physical Therapy Med Surg Daily Treatment Note  Facility/Department: [facility]  Room: [place] [id number]       NAME: Merle Parmar  : 1955 (60 y.o.)  MRN: [id number]  CODE STATUS: Full Code    Date of Service: 3/10/2020    Patient Diagnosis(es): Cellulitis [N60.76]   Chief Complaint   Patient presents with    Other     missed wound clinic appointment today and refused to see NP. pt has been by clinic before     Patient Active Problem List    Diagnosis Date Noted    Cellulitis 2020    PAD [facility]peripheral [facility] disease) [facility] 2020    Chest pain 2020    Neuropathy due to secondary diabetes ([facility] 2020    JESICA (obstructive sleep apnea)     Athscl native arteries of left leg w ulceration oth prt foot ([facility] 2019    Diabetic ulcer of right foot with bone involvement without evidence of necrosis ([facility] 2019    Rectal bleeding     Surgical wound dehiscence, initial encounter 2019    S/P amputation of lesser toe, right ([address] 2019    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     Skin infection 2019    Acute osteomyelitis ([facility] 2019    Atherosclerotic PVD with intermittent claudication ([facility] 2019    Non-pressure ulcer of toe ([facility] 2019    Type 2 diabetes mellitus with hyperglycemia, with long-term current use of insulin ([facility] 2019    HTN (hypertension) 2019    HLD (hyperlipidemia) 2019    Hypothyroid 2019    HF (heart failure) ([facility] 2019    Severe major depression with psychotic features ([facility] 2019        Past Medical History:   Diagnosis Date    Asthma     CAD (coronary artery disease)     CKD (chronic kidney disease) stage 3, GFR 30-59 ml/min ([facility])     Colitis     Diabetes mellitus ([address]     Hyperlipidemia     Hypertension     PAD (peripheral artery disease) ([facility])     Prolonged emergence from general anesthesia     PVD (peripheral vascular disease) ([facility]     Thyroid disease Past Surgical History:   Procedure Laterality Date    CARDIAC SURGERY       SECTION      COLONOSCOPY N/A 2019    COLONOSCOPY DIAGNOSTIC performed by Marie Brunson MD at [address] GRAFT  2019    unknown vessels    HYSTERECTOMY      TOE AMPUTATION Right     3rd toe          Restrictions  Restrictions/Precautions: Fall Risk;Up as Tolerated;Contact Precautions;Weight Bearing(Medium falls; MRSA)  Lower Extremity Weight Bearing Restrictions  Right Lower Extremity Weight Bearing: Weight Bearing As Tolerated(In post op shoes)  Left Lower Extremity Weight Bearing: Weight Bearing As Tolerated(In post op shoes)  Position Activity Restriction  Other position/activity restrictions: B post op shoes    SUBJECTIVE   Subjective  Subjective: I don't want to walk in hallway. General Comment  Comments: I only speak a little bit of english. Pt was able to understand my Japanese and was agreeable to tx. Pre-Session Pain Report  Pre Treatment Pain Screening  Pain at present: 10  Intervention List: Patient able to continue with treatment          Post-Session Pain Report  Pain Assessment  Pain Level: 10  Pain Type: Acute pain  Pain Location: Foot  Pain Orientation: Left  Pain Descriptors: Aching         OBJECTIVE        Bed mobility  Rolling to Left: Independent  Rolling to Right: Independent  Supine to Sit: Independent  Sit to Supine: Independent  Comment: HOB slightly elevated; pt declined laying the bed flat. Steady. Transfers  Sit to Stand: Supervision  Stand to sit: Supervision  Comment: Pt attempted to don post op shoe but required assistance; vc's for technique and hand placement; good follow through.      Ambulation  WB Status: WBAT in post op shoes  Ambulation 1  Surface: level tile  Device: Rolling Walker  Assistance: Stand by assistance  Quality of Gait: WBOS, shortened step length, shuffling pattern; vc's for pacing and step length  Distance: 25'x2  Comments: Distance limited by in-room ambulation only. Pt encouraged to increase ambulation but declined and returned to bed. Activity Tolerance  Activity Tolerance: Patient limited by pain  Activity Tolerance: Self limiting          ASSESSMENT   Assessment: Pt was limited by pain this session. Pt is indep with bed mobility. She did not tolerate ambulation well d/t increased pain. Encouragement needed. Discharge Recommendations:  Continue to assess pending progress    Goals  Long term goals  Long term goal 1: indep with bed mobility   Long term goal 2: indep with functional transfers  Long term goal 3: amb with 2ww 50ft with indep  Long term goal 4: indep with HEP to improve LE strength  Long term goal 5: Standing balance > Fair+    PLAN    Plan  Plan Comment: Cont. POC  Safety Devices  Type of devices: Left in bed;Bed alarm in place;Call light within reach; All fall risk precautions in place;Nurse notified     [facility] (6 CLICK) Ger[facility] Inpatient Mobility Raw Score : 20      Therapy Time   Individual   Time In 1545   Time Out 1600   Minutes 15      bm/trsf - 5 mins  Gait - 10 mins       Emily Thomas PTA, 03/10/20 at 4:05 PM       Definitions for assistance levels  Independent = pt does not require any physical supervision or assistance from another person for activity completion. Device may be needed.   Stand by assistance = pt requires verbal cues or instructions from another person, close to but not touching, to perform the activity  Minimal assistance= pt performs 75% or more of the activity; assistance is required to complete the activity  Moderate assistance= pt performs 50% of the activity; assistance is required to complete the activity  Maximal assistance = pt performs 25% of the activity; assistance is required to complete the activity  Dependent = pt requires total physical assistance to accomplish the task

## 2020-03-10 NOTE — CARE COORDINATION
[facility] has not received any faxes sent through [address] [place] [address] Rd. Sent to alternate fax by regular fax - [phone].

## 2020-03-11 NOTE — PROGRESS NOTES
Physical Therapy  Facility/Department: [facility] MED SURG [id number]/[id number]  Physical Therapy Discharge      NAME: Yola Hawley    : 1955 (89 y.o.)  MRN: [id number]    Account: [de-identified]  Gender: female      Patient has been discharged from acute care hospital. DC patient from current PT program.      Electronically signed by Xiang Hair PT on 3/11/20 at 4:52 PM EDT

## 2020-03-18 ENCOUNTER — HOSPITAL ENCOUNTER (OUTPATIENT)
Dept: WOUND CARE | Age: 65
Discharge: HOME | End: 2020-03-18
Payer: COMMERCIAL

## 2020-03-18 VITALS
HEART RATE: 65 BPM | DIASTOLIC BLOOD PRESSURE: 56 MMHG | TEMPERATURE: 98 F | SYSTOLIC BLOOD PRESSURE: 117 MMHG | RESPIRATION RATE: 20 BRPM

## 2020-03-18 PROCEDURE — 99214 OFFICE O/P EST MOD 30 MIN: CPT | Performed by: SURGERY

## 2020-03-18 PROCEDURE — 99213 OFFICE O/P EST LOW 20 MIN: CPT

## 2020-03-18 ASSESSMENT — PAIN DESCRIPTION - PAIN TYPE: TYPE: ACUTE PAIN

## 2020-03-18 ASSESSMENT — PAIN DESCRIPTION - LOCATION: LOCATION: FOOT

## 2020-03-18 ASSESSMENT — PAIN DESCRIPTION - ORIENTATION: ORIENTATION: LEFT

## 2020-03-18 ASSESSMENT — PAIN SCALES - GENERAL: PAINLEVEL_OUTOF10: 7

## 2020-03-18 ASSESSMENT — PAIN DESCRIPTION - DESCRIPTORS: DESCRIPTORS: SORE;ACHING

## 2020-03-18 NOTE — PROGRESS NOTES
Offloading for Diabetic Foot Ulcers Post op shoe 2/27/2020  9:09 AM   Dressing Status Clean;Dry; Intact 3/9/2020  8:05 AM   Dressing Changed Changed/New 9/17/2019 11:38 AM   Dressing/Treatment Antibacterial ointment;Dry dressing 3/9/2020  8:05 AM   Wound Cleansed Rinsed/Irrigated with saline 3/18/2020 10:26 AM   Wound Length (cm) 0.7 cm 3/18/2020 10:26 AM   Wound Width (cm) 0.7 cm 3/18/2020 10:26 AM   Wound Depth (cm) 0.5 cm 3/18/2020 10:26 AM   Wound Surface Area (cm^2) 0.49 cm^2 3/18/2020 10:26 AM   Change in Wound Size % (l*w) 38.75 3/18/2020 10:26 AM   Wound Volume (cm^3) 0.24 cm^3 3/18/2020 10:26 AM   Wound Healing % -50 3/18/2020 10:26 AM   Post-Procedure Length (cm) 1 cm 1/3/2020  2:32 PM   Post-Procedure Width (cm) 1 cm 1/3/2020  2:32 PM   Post-Procedure Depth (cm) 0.3 cm 1/3/2020  2:32 PM   Post-Procedure Surface Area (cm^2) 1 cm^2 1/3/2020  2:32 PM   Post-Procedure Volume (cm^3) 0.3 cm^3 1/3/2020  2:32 PM   Undermining Starts ___ O'Clock 12 11/22/2019  3:37 PM   Undermining Ends___ O'Clock 8 11/22/2019  3:37 PM   Undermining Maxium Distance (cm) 0.4 11/22/2019  3:37 PM   Wound Assessment Drainage 3/9/2020  8:05 AM   Drainage Amount Moderate 3/18/2020 10:26 AM   Drainage Description Serosanguinous 3/18/2020 10:26 AM   Odor None 3/18/2020 10:26 AM   Margins Defined edges 3/18/2020 10:26 AM   Flore[place]-wound Assessment Maceration 3/18/2020 10:26 AM   Non-staged Wound Description Full thickness 3/18/2020 10:26 AM   [place]%Wound Bed 100 2/21/2020  1:53 PM   Red%Wound Bed 50 2/7/2020  1:14 PM   Yellow%Wound Bed 100 3/18/2020 10:26 AM   Black%Wound Bed 10 11/1/2019  2:12 PM   Other%Wound Bed 50 white 1/24/2020  2:32 PM   Number of days: 329       Wound 02/21/20 Toe (Comment  which one) Left #2 5th mediaL  (Active)   Wound Image   3/18/2020 10:26 AM   Wound Arterial 3/18/2020 10:26 AM   Offloading for Diabetic Foot Ulcers Post op shoe 2/27/2020  9:09 AM   Dressing Status Clean;Dry; Intact 3/9/2020  8:05 AM (Active)   Wound Image   3/18/2020 10:26 AM   Wound Cleansed Rinsed/Irrigated with saline 3/18/2020 10:26 AM   Wound Length (cm) 1.1 cm 3/18/2020 10:26 AM   Wound Width (cm) 0.9 cm 3/18/2020 10:26 AM   Wound Depth (cm) 0.1 cm 3/18/2020 10:26 AM   Wound Surface Area (cm^2) 0.99 cm^2 3/18/2020 10:26 AM   Wound Volume (cm^3) 0.1 cm^3 3/18/2020 10:26 AM   Drainage Amount Small 3/18/2020 10:26 AM   Drainage Description Serosanguinous 3/18/2020 10:26 AM   Odor None 3/18/2020 10:26 AM   Margins Defined edges 3/18/2020 10:26 AM   Flore[place]-wound Assessment Calloused 3/18/2020 10:26 AM   Non-staged Wound Description Full thickness 3/18/2020 10:26 AM   Black%Wound Bed 100 3/18/2020 10:26 AM   Number of days: 0         Plan:   Right foot diabetic ulcer with severe pvd and ckd. Cont hbo. Order regranex. Left 4th and 5th toe ulcerations healing with granulation tissue. Left heel ulcer with dry eschar. Needs santyl to left heel. Needs wheelchair to offload foot. Will order left foot orthotic to offload heel. [facility]    Physician Orders and Discharge Instructions  [address]  Telephone: [phone]      [phone]        NAME: Letha Pollack  DATE OF BIRTH:  1955  MEDICAL RECORD NUMBER:  [id number]     Home Care/Facility: [address] [place] [address]     Wound Location:  RIGHT THIRD TOE AMPUTATION SITE     Dressing orders: 1. Cleanse wound(s) with normal saline. 2. Apply dry SILVERCEL OR CALCIUM ALGINATE WITH Ag or eqivalent to wound bed. Gently tuck in undermining area. 3. Cover with 4x4's and wrap with gauze (makenzie or kerlix)  4.  Change  Every other day or Monday, Wednesday, and Friday        Compression:  NONE      Offloading Device:  PATIENT TO WEAR OFFLOADING SHOE      Other Instructions:  PATIENT MAY AMBULATE.      Keep all dressings clean, dry and intact.  Keep pressure off the wound(s) at all times.      Follow up visit: 2  Weeks:  December 6 , 2019     Please give 24 hour notice if unable to keep appointment. [phone]     If you experience any of the following, please call the Wound Care Service at  [phone] or go to the nearest emergency room.     *Increase in pain         *Temperature over 101           *Increase in drainage from your wound or a foul odor  *Uncontrolled swelling            *Need for compression bandage changes due to slippage, breakthrough drainage     PLEASE NOTE: IF YOU ARE UNABLE TO OBTAIN WOUND SUPPLIES, CONTINUE TO USE THE SUPPLIES YOU HAVE AVAILABLE UNTIL YOU ARE ABLE TO [facility].  IT IS MOST IMPORTANT TO KEEP THE WOUND COVERED AT ALL        Electronically signed by Alexsandra Arita MD on 3/18/2020 at 11:10 AM.

## 2020-04-08 ENCOUNTER — HOSPITAL ENCOUNTER (OUTPATIENT)
Dept: WOUND CARE | Age: 65
Discharge: HOME | End: 2020-04-08
Payer: COMMERCIAL

## 2020-04-08 VITALS
DIASTOLIC BLOOD PRESSURE: 68 MMHG | HEART RATE: 76 BPM | SYSTOLIC BLOOD PRESSURE: 127 MMHG | RESPIRATION RATE: 20 BRPM | TEMPERATURE: 97.7 F

## 2020-04-08 PROCEDURE — 99213 OFFICE O/P EST LOW 20 MIN: CPT

## 2020-04-08 PROCEDURE — 99213 OFFICE O/P EST LOW 20 MIN: CPT | Performed by: SURGERY

## 2020-04-08 ASSESSMENT — PAIN DESCRIPTION - DESCRIPTORS: DESCRIPTORS: ACHING;CONSTANT

## 2020-04-08 ASSESSMENT — PAIN DESCRIPTION - ORIENTATION: ORIENTATION: RIGHT;LEFT

## 2020-04-08 ASSESSMENT — PAIN DESCRIPTION - FREQUENCY: FREQUENCY: CONTINUOUS

## 2020-04-08 ASSESSMENT — PAIN SCALES - GENERAL: PAINLEVEL_OUTOF10: 10

## 2020-04-08 ASSESSMENT — PAIN DESCRIPTION - PROGRESSION: CLINICAL_PROGRESSION: GRADUALLY WORSENING

## 2020-04-08 ASSESSMENT — PAIN DESCRIPTION - ONSET: ONSET: ON-GOING

## 2020-04-08 ASSESSMENT — PAIN DESCRIPTION - PAIN TYPE: TYPE: CHRONIC PAIN

## 2020-04-08 ASSESSMENT — PAIN DESCRIPTION - LOCATION: LOCATION: FOOT

## 2020-04-08 ASSESSMENT — PAIN - FUNCTIONAL ASSESSMENT: PAIN_FUNCTIONAL_ASSESSMENT: PREVENTS OR INTERFERES WITH MANY ACTIVE NOT PASSIVE ACTIVITIES

## 2020-04-08 NOTE — PROGRESS NOTES
[facility] [address]                                                   Progress Note and Procedure Note      Garth Mota RECORD NUMBER:  [id number]  AGE: 60 y.o. GENDER: female  : 1955  EPISODE DATE:  2020    Subjective:     Chief Complaint   Patient presents with    Wound Check     L and R foot wounds recheck         HISTORY of PRESENT ILLNESS ROHIT Day is a 60 y.o. female who presents today for wound/ulcer evaluation. History of Wound Context: Patient is a 66year old female with history of right foot non-healing diabetic right foot ulcer. Patient had CABG at [address] [place] [address] and some peripheral intervention done at [address] [place] [address]. She was hospitalized for 3 months and discharge in August.  Patient presents today with a non-healing right foot ulcer. No fever or chills. No rest pain. Patient was admitted in the beginning of Oct 2019 and had pci. Patient had follow up with cardiology after her PCI and said she is at acceptable risk for peripheral angiogram.    Patient started hbo therapy 12/ week. She complete her abx . Patient c/o left foot pain. No rest pain pain burning in nature. Patient now with new ulceration left 5th toe from rubbing against 4th toe. No erythema noted. She had an atherectomy of her left sfa/pop and peroneal artery on . Patient remains very non-compliant. Continues to walk on her heel ulcer. She has not gotten the regranex for her right foot ulcer. Ms. Buffy Thomas has a past medical history of Asthma, CAD (coronary artery disease), Colitis, Diabetes mellitus ([address] Hyperlipidemia, Hypertension, Prolonged emergence from general anesthesia, PVD (peripheral vascular disease) ([address] and Thyroid disease. She has a past surgical history that includes  section; Hysterectomy; Cardiac surgery; Coronary artery bypass graft (2019); Toe amputation (Right); and Colonoscopy (N/A, 2019).     Her family history is not 0-6 Units into the skin 3 times daily (with meals) 1 vial 3    levothyroxine (SYNTHROID) 50 MCG tablet Take 50 mcg by mouth Daily      furosemide (LASIX) 40 MG tablet Take 40 mg by mouth Indications: M-W-F       meclizine (ANTIVERT) 25 MG tablet Take 25 mg by mouth three times daily       No current facility-administered medications on file prior to encounter. REVIEW OF SYSTEMS    Constitutional: negative  Respiratory: negative  Cardiovascular: negative  Behavioral/Psych: negative  Allergic/Immunologic: positive for ambien    Objective:      /68   Pulse 76   Temp 97.7 °F (36.5 °C) (Temporal)   Resp 20     Wt Readings from Last 3 Encounters:   03/04/20 237 lb (107.5 kg)   02/27/20 243 lb 3.2 oz (110.3 kg)   02/12/20 217 lb (98.4 kg)       PHYSICAL EXAM    Constitutional:   Well nourished and well developed. Appears neat and clean. Patient is alert, oriented x3, and in no apparent distress. Respiratory:  Respiratory effort is easy and symmetric bilaterally. Rate is normal at rest and on room air. Vascular:  Pedal Pulses is not palpable and audible with doppler. Capillary refill is <3 sec to digits bilateral.  Extremities positivefor 1+ pitting edema. Neurological:   Sensation is decreased to lower extremities. Dermatological:  Wound description noted in wound assessment. The wound(s) are diabetic foot ulcer with gangrene requiring 3rd toe amputation now with exposed bone and tendon. Left 4th and 5th toe ulceration. Left heel ulcer. Psychiatric:  Judgement and insight intact. Short and long term memory intact. No evidence of depression, anxiety, or agitation. Patient is calm, cooperative, and communicative. Appropriate interactions and affect.         Assessment:   Right foot non-healing ulcer   Wound Care Documentation:  Wound 04/23/19 Toe (Comment  which one) Right #1. 3rd toe (Active)   Wound Image   3/18/2020 10:26 AM   Wound Non-Healing Surgical 3/18/2020 10:26 AM

## 2020-04-21 ENCOUNTER — APPOINTMENT (OUTPATIENT)
Dept: GENERAL RADIOLOGY | Age: 65
DRG: 175 | End: 2020-04-21
Payer: COMMERCIAL

## 2020-04-21 ENCOUNTER — APPOINTMENT (OUTPATIENT)
Dept: CT IMAGING | Age: 65
DRG: 175 | End: 2020-04-21
Payer: COMMERCIAL

## 2020-04-21 ENCOUNTER — HOSPITAL ENCOUNTER (INPATIENT)
Age: 65
LOS: 6 days | Discharge: HOME HEALTH | DRG: 175 | End: 2020-04-28
Attending: EMERGENCY MEDICINE | Admitting: INTERNAL MEDICINE
Payer: COMMERCIAL

## 2020-04-21 ENCOUNTER — APPOINTMENT (OUTPATIENT)
Dept: ULTRASOUND IMAGING | Age: 65
DRG: 175 | End: 2020-04-21
Payer: COMMERCIAL

## 2020-04-21 PROBLEM — R55 SYNCOPE AND COLLAPSE: Status: ACTIVE | Noted: 2020-04-21

## 2020-04-21 LAB
ALBUMIN SERPL-MCNC: 3.7 G/DL (ref 3.5–4.6)
ALP BLD-CCNC: 113 U/L (ref 40–130)
ALT SERPL-CCNC: 30 U/L (ref 0–33)
AMPHETAMINE SCREEN, URINE: ABNORMAL
ANION GAP SERPL CALCULATED.3IONS-SCNC: 10 MEQ/L (ref 9–15)
ANION GAP SERPL CALCULATED.3IONS-SCNC: 16 MEQ/L (ref 9–15)
APTT: 30.7 SEC (ref 24.4–36.8)
AST SERPL-CCNC: 32 U/L (ref 0–35)
BARBITURATE SCREEN URINE: ABNORMAL
BASE EXCESS VENOUS: -1 (ref -3–3)
BASOPHILS ABSOLUTE: 0.1 K/UL (ref 0–0.2)
BASOPHILS RELATIVE PERCENT: 0.8 %
BENZODIAZEPINE SCREEN, URINE: POSITIVE
BILIRUB SERPL-MCNC: 0.3 MG/DL (ref 0.2–0.7)
BILIRUBIN URINE: NEGATIVE
BLOOD, URINE: NEGATIVE
BUN BLDV-MCNC: 43 MG/DL (ref 8–23)
BUN BLDV-MCNC: 50 MG/DL (ref 8–23)
CALCIUM IONIZED: 1.11 MMOL/L (ref 1.12–1.32)
CALCIUM SERPL-MCNC: 9.3 MG/DL (ref 8.5–9.9)
CALCIUM SERPL-MCNC: 9.5 MG/DL (ref 8.5–9.9)
CANNABINOID SCREEN URINE: ABNORMAL
CHLORIDE BLD-SCNC: 92 MEQ/L (ref 95–107)
CHLORIDE BLD-SCNC: 95 MEQ/L (ref 95–107)
CHP ED QC CHECK: YES
CLARITY: CLEAR
CO2: 22 MEQ/L (ref 20–31)
CO2: 24 MEQ/L (ref 20–31)
COCAINE METABOLITE SCREEN URINE: ABNORMAL
COLOR: YELLOW
CREAT SERPL-MCNC: 1.53 MG/DL (ref 0.5–0.9)
CREAT SERPL-MCNC: 1.56 MG/DL (ref 0.5–0.9)
EOSINOPHILS ABSOLUTE: 0.2 K/UL (ref 0–0.7)
EOSINOPHILS RELATIVE PERCENT: 2.7 %
ETHANOL PERCENT: NORMAL G/DL
ETHANOL: <10 MG/DL (ref 0–0.08)
GFR AFRICAN AMERICAN: 39
GFR AFRICAN AMERICAN: 40.6
GFR AFRICAN AMERICAN: 41.6
GFR NON-AFRICAN AMERICAN: 33
GFR NON-AFRICAN AMERICAN: 33.6
GFR NON-AFRICAN AMERICAN: 34.3
GLOBULIN: 3.8 G/DL (ref 2.3–3.5)
GLUCOSE BLD-MCNC: 321 MG/DL (ref 60–115)
GLUCOSE BLD-MCNC: 326 MG/DL (ref 60–115)
GLUCOSE BLD-MCNC: 346 MG/DL
GLUCOSE BLD-MCNC: 346 MG/DL (ref 60–115)
GLUCOSE BLD-MCNC: 355 MG/DL (ref 70–99)
GLUCOSE BLD-MCNC: 377 MG/DL (ref 70–99)
GLUCOSE BLD-MCNC: 390 MG/DL (ref 60–115)
GLUCOSE URINE: 250 MG/DL
HCO3 VENOUS: 25.6 MMOL/L (ref 23–29)
HCT VFR BLD CALC: 33.5 % (ref 37–47)
HEMOGLOBIN: 11.1 G/DL (ref 12–16)
HEMOGLOBIN: 12.2 GM/DL (ref 12–16)
INFLUENZA A BY PCR: NEGATIVE
INFLUENZA B BY PCR: NEGATIVE
INR BLD: 1
KETONES, URINE: NEGATIVE MG/DL
LACTATE: 1.73 MMOL/L (ref 0.4–2)
LACTIC ACID: 1.5 MMOL/L (ref 0.5–2.2)
LACTIC ACID: 1.8 MMOL/L (ref 0.5–2.2)
LACTIC ACID: 3.4 MMOL/L (ref 0.5–2.2)
LEUKOCYTE ESTERASE, URINE: NEGATIVE
LYMPHOCYTES ABSOLUTE: 1.8 K/UL (ref 1–4.8)
LYMPHOCYTES RELATIVE PERCENT: 22.8 %
Lab: ABNORMAL
MAGNESIUM: 2.1 MG/DL (ref 1.7–2.4)
MCH RBC QN AUTO: 25.3 PG (ref 27–31.3)
MCHC RBC AUTO-ENTMCNC: 33.1 % (ref 33–37)
MCV RBC AUTO: 76.3 FL (ref 82–100)
METHADONE SCREEN, URINE: ABNORMAL
MONOCYTES ABSOLUTE: 0.8 K/UL (ref 0.2–0.8)
MONOCYTES RELATIVE PERCENT: 10 %
NEUTROPHILS ABSOLUTE: 4.9 K/UL (ref 1.4–6.5)
NEUTROPHILS RELATIVE PERCENT: 63.7 %
NITRITE, URINE: NEGATIVE
O2 SAT, VEN: 62 %
OPIATE SCREEN URINE: ABNORMAL
OXYCODONE URINE: ABNORMAL
PCO2, VEN: 53 MM HG (ref 40–50)
PDW BLD-RTO: 17.9 % (ref 11.5–14.5)
PERFORMED ON: ABNORMAL
PH UA: 5 (ref 5–9)
PH VENOUS: 7.29 (ref 7.35–7.45)
PHENCYCLIDINE SCREEN URINE: ABNORMAL
PLATELET # BLD: 161 K/UL (ref 130–400)
PO2, VEN: 36 MM HG
POC CHLORIDE: 98 MEQ/L (ref 99–110)
POC CREATININE: 1.6 MG/DL (ref 0.6–1.2)
POC HEMATOCRIT: 36 % (ref 36–48)
POC POTASSIUM: 5.9 MEQ/L (ref 3.5–5.1)
POC SAMPLE TYPE: ABNORMAL
POC SODIUM: 128 MEQ/L (ref 136–145)
POTASSIUM REFLEX MAGNESIUM: 5.2 MEQ/L (ref 3.4–4.9)
POTASSIUM SERPL-SCNC: 6.7 MEQ/L (ref 3.4–4.9)
POTASSIUM SERPL-SCNC: ABNORMAL MEQ/L (ref 3.4–4.9)
PRO-BNP: 4202 PG/ML
PROPOXYPHENE SCREEN: ABNORMAL
PROTEIN UA: NEGATIVE MG/DL
PROTHROMBIN TIME: 13.8 SEC (ref 12.3–14.9)
RBC # BLD: 4.39 M/UL (ref 4.2–5.4)
REASON FOR REJECTION: NORMAL
REJECTED TEST: NORMAL
SODIUM BLD-SCNC: 126 MEQ/L (ref 135–144)
SODIUM BLD-SCNC: 133 MEQ/L (ref 135–144)
SPECIFIC GRAVITY UA: 1.01 (ref 1–1.03)
TCO2 CALC VENOUS: 27 MMOL/L
TOTAL CK: 104 U/L (ref 0–170)
TOTAL PROTEIN: 7.5 G/DL (ref 6.3–8)
TROPONIN: <0.01 NG/ML (ref 0–0.01)
TSH REFLEX: 2.53 UIU/ML (ref 0.44–3.86)
URINE REFLEX TO CULTURE: ABNORMAL
UROBILINOGEN, URINE: 0.2 E.U./DL
WBC # BLD: 7.7 K/UL (ref 4.8–10.8)

## 2020-04-21 PROCEDURE — 71045 X-RAY EXAM CHEST 1 VIEW: CPT

## 2020-04-21 PROCEDURE — 85610 PROTHROMBIN TIME: CPT

## 2020-04-21 PROCEDURE — 83605 ASSAY OF LACTIC ACID: CPT

## 2020-04-21 PROCEDURE — 96375 TX/PRO/DX INJ NEW DRUG ADDON: CPT

## 2020-04-21 PROCEDURE — 82550 ASSAY OF CK (CPK): CPT

## 2020-04-21 PROCEDURE — 2580000003 HC RX 258: Performed by: NURSE PRACTITIONER

## 2020-04-21 PROCEDURE — 6830039000 HC L3 TRAUMA ALERT

## 2020-04-21 PROCEDURE — 36415 COLL VENOUS BLD VENIPUNCTURE: CPT

## 2020-04-21 PROCEDURE — 80053 COMPREHEN METABOLIC PANEL: CPT

## 2020-04-21 PROCEDURE — 6360000004 HC RX CONTRAST MEDICATION: Performed by: NURSE PRACTITIONER

## 2020-04-21 PROCEDURE — 96376 TX/PRO/DX INJ SAME DRUG ADON: CPT

## 2020-04-21 PROCEDURE — 83735 ASSAY OF MAGNESIUM: CPT

## 2020-04-21 PROCEDURE — 6370000000 HC RX 637 (ALT 250 FOR IP): Performed by: NURSE PRACTITIONER

## 2020-04-21 PROCEDURE — 6360000002 HC RX W HCPCS: Performed by: INTERNAL MEDICINE

## 2020-04-21 PROCEDURE — 85730 THROMBOPLASTIN TIME PARTIAL: CPT

## 2020-04-21 PROCEDURE — 80307 DRUG TEST PRSMV CHEM ANLYZR: CPT

## 2020-04-21 PROCEDURE — 84295 ASSAY OF SERUM SODIUM: CPT

## 2020-04-21 PROCEDURE — 94664 DEMO&/EVAL PT USE INHALER: CPT

## 2020-04-21 PROCEDURE — 71275 CT ANGIOGRAPHY CHEST: CPT

## 2020-04-21 PROCEDURE — 84132 ASSAY OF SERUM POTASSIUM: CPT

## 2020-04-21 PROCEDURE — 99285 EMERGENCY DEPT VISIT HI MDM: CPT

## 2020-04-21 PROCEDURE — 81003 URINALYSIS AUTO W/O SCOPE: CPT

## 2020-04-21 PROCEDURE — G0378 HOSPITAL OBSERVATION PER HR: HCPCS

## 2020-04-21 PROCEDURE — 93880 EXTRACRANIAL BILAT STUDY: CPT

## 2020-04-21 PROCEDURE — 82435 ASSAY OF BLOOD CHLORIDE: CPT

## 2020-04-21 PROCEDURE — 93005 ELECTROCARDIOGRAM TRACING: CPT | Performed by: NURSE PRACTITIONER

## 2020-04-21 PROCEDURE — 82948 REAGENT STRIP/BLOOD GLUCOSE: CPT

## 2020-04-21 PROCEDURE — 82565 ASSAY OF CREATININE: CPT

## 2020-04-21 PROCEDURE — G0480 DRUG TEST DEF 1-7 CLASSES: HCPCS

## 2020-04-21 PROCEDURE — 72125 CT NECK SPINE W/O DYE: CPT

## 2020-04-21 PROCEDURE — 85014 HEMATOCRIT: CPT

## 2020-04-21 PROCEDURE — 96372 THER/PROPH/DIAG INJ SC/IM: CPT

## 2020-04-21 PROCEDURE — 84443 ASSAY THYROID STIM HORMONE: CPT

## 2020-04-21 PROCEDURE — 84484 ASSAY OF TROPONIN QUANT: CPT

## 2020-04-21 PROCEDURE — 83880 ASSAY OF NATRIURETIC PEPTIDE: CPT

## 2020-04-21 PROCEDURE — 82330 ASSAY OF CALCIUM: CPT

## 2020-04-21 PROCEDURE — 6360000002 HC RX W HCPCS: Performed by: NURSE PRACTITIONER

## 2020-04-21 PROCEDURE — 96365 THER/PROPH/DIAG IV INF INIT: CPT

## 2020-04-21 PROCEDURE — 85025 COMPLETE CBC W/AUTO DIFF WBC: CPT

## 2020-04-21 PROCEDURE — 82803 BLOOD GASES ANY COMBINATION: CPT

## 2020-04-21 PROCEDURE — 87502 INFLUENZA DNA AMP PROBE: CPT

## 2020-04-21 PROCEDURE — 70450 CT HEAD/BRAIN W/O DYE: CPT

## 2020-04-21 RX ORDER — ACETAMINOPHEN 500 MG
500 TABLET ORAL 3 TIMES DAILY
COMMUNITY
End: 2021-03-09

## 2020-04-21 RX ORDER — METOPROLOL TARTRATE 50 MG/1
50 TABLET, FILM COATED ORAL DAILY
Status: DISCONTINUED | OUTPATIENT
Start: 2020-04-21 | End: 2020-04-22

## 2020-04-21 RX ORDER — TRAMADOL HYDROCHLORIDE 50 MG/1
50 TABLET ORAL 3 TIMES DAILY
Status: ON HOLD | COMMUNITY
End: 2020-05-12 | Stop reason: HOSPADM

## 2020-04-21 RX ORDER — POLYETHYLENE GLYCOL 3350 17 G/17G
17 POWDER, FOR SOLUTION ORAL DAILY PRN
Status: DISCONTINUED | OUTPATIENT
Start: 2020-04-21 | End: 2020-04-28 | Stop reason: HOSPADM

## 2020-04-21 RX ORDER — SODIUM CHLORIDE 0.9 % (FLUSH) 0.9 %
10 SYRINGE (ML) INJECTION PRN
Status: DISCONTINUED | OUTPATIENT
Start: 2020-04-21 | End: 2020-04-28 | Stop reason: HOSPADM

## 2020-04-21 RX ORDER — GABAPENTIN 400 MG/1
800 CAPSULE ORAL NIGHTLY
Status: DISCONTINUED | OUTPATIENT
Start: 2020-04-21 | End: 2020-04-28 | Stop reason: HOSPADM

## 2020-04-21 RX ORDER — 0.9 % SODIUM CHLORIDE 0.9 %
1000 INTRAVENOUS SOLUTION INTRAVENOUS ONCE
Status: COMPLETED | OUTPATIENT
Start: 2020-04-21 | End: 2020-04-21

## 2020-04-21 RX ORDER — AMITRIPTYLINE HYDROCHLORIDE 25 MG/1
25 TABLET, FILM COATED ORAL NIGHTLY
Status: DISCONTINUED | OUTPATIENT
Start: 2020-04-21 | End: 2020-04-28 | Stop reason: HOSPADM

## 2020-04-21 RX ORDER — SODIUM POLYSTYRENE SULFONATE 15 G/60ML
30 SUSPENSION ORAL; RECTAL ONCE
Status: COMPLETED | OUTPATIENT
Start: 2020-04-21 | End: 2020-04-21

## 2020-04-21 RX ORDER — ONDANSETRON 2 MG/ML
4 INJECTION INTRAMUSCULAR; INTRAVENOUS EVERY 6 HOURS PRN
Status: DISCONTINUED | OUTPATIENT
Start: 2020-04-21 | End: 2020-04-28 | Stop reason: HOSPADM

## 2020-04-21 RX ORDER — FOLIC ACID 1 MG/1
1 TABLET ORAL DAILY
Status: DISCONTINUED | OUTPATIENT
Start: 2020-04-21 | End: 2020-04-28 | Stop reason: HOSPADM

## 2020-04-21 RX ORDER — DEXTROSE MONOHYDRATE 25 G/50ML
25 INJECTION, SOLUTION INTRAVENOUS ONCE
Status: COMPLETED | OUTPATIENT
Start: 2020-04-21 | End: 2020-04-21

## 2020-04-21 RX ORDER — HYDROXYZINE PAMOATE 25 MG/1
50 CAPSULE ORAL 3 TIMES DAILY PRN
Status: DISCONTINUED | OUTPATIENT
Start: 2020-04-21 | End: 2020-04-28 | Stop reason: HOSPADM

## 2020-04-21 RX ORDER — DEXTROSE MONOHYDRATE 25 G/50ML
12.5 INJECTION, SOLUTION INTRAVENOUS PRN
Status: DISCONTINUED | OUTPATIENT
Start: 2020-04-21 | End: 2020-04-28 | Stop reason: HOSPADM

## 2020-04-21 RX ORDER — LEVOTHYROXINE SODIUM 0.05 MG/1
50 TABLET ORAL DAILY
Status: DISCONTINUED | OUTPATIENT
Start: 2020-04-22 | End: 2020-04-28 | Stop reason: HOSPADM

## 2020-04-21 RX ORDER — ACETAMINOPHEN 650 MG/1
650 SUPPOSITORY RECTAL EVERY 6 HOURS PRN
Status: DISCONTINUED | OUTPATIENT
Start: 2020-04-21 | End: 2020-04-28 | Stop reason: HOSPADM

## 2020-04-21 RX ORDER — NICOTINE POLACRILEX 4 MG
15 LOZENGE BUCCAL PRN
Status: DISCONTINUED | OUTPATIENT
Start: 2020-04-21 | End: 2020-04-28 | Stop reason: HOSPADM

## 2020-04-21 RX ORDER — SODIUM CHLORIDE 9 MG/ML
INJECTION, SOLUTION INTRAVENOUS CONTINUOUS
Status: DISPENSED | OUTPATIENT
Start: 2020-04-21 | End: 2020-04-22

## 2020-04-21 RX ORDER — AMITRIPTYLINE HYDROCHLORIDE 25 MG/1
25 TABLET, FILM COATED ORAL NIGHTLY
Status: ON HOLD | COMMUNITY
End: 2021-01-01

## 2020-04-21 RX ORDER — ALBUTEROL SULFATE 90 UG/1
2 AEROSOL, METERED RESPIRATORY (INHALATION) PRN
COMMUNITY
End: 2020-11-23 | Stop reason: SDUPTHER

## 2020-04-21 RX ORDER — ACETAMINOPHEN 325 MG/1
650 TABLET ORAL EVERY 6 HOURS PRN
Status: DISCONTINUED | OUTPATIENT
Start: 2020-04-21 | End: 2020-04-28 | Stop reason: HOSPADM

## 2020-04-21 RX ORDER — CHOLECALCIFEROL (VITAMIN D3) 125 MCG
1000 CAPSULE ORAL DAILY
Status: DISCONTINUED | OUTPATIENT
Start: 2020-04-21 | End: 2020-04-28 | Stop reason: HOSPADM

## 2020-04-21 RX ORDER — ATORVASTATIN CALCIUM 40 MG/1
40 TABLET, FILM COATED ORAL DAILY
Status: DISCONTINUED | OUTPATIENT
Start: 2020-04-21 | End: 2020-04-27

## 2020-04-21 RX ORDER — CLOPIDOGREL BISULFATE 75 MG/1
75 TABLET ORAL DAILY
Status: DISCONTINUED | OUTPATIENT
Start: 2020-04-21 | End: 2020-04-24

## 2020-04-21 RX ORDER — TRAMADOL HYDROCHLORIDE 50 MG/1
50 TABLET ORAL 3 TIMES DAILY
Status: DISCONTINUED | OUTPATIENT
Start: 2020-04-21 | End: 2020-04-22

## 2020-04-21 RX ORDER — GABAPENTIN 400 MG/1
800 CAPSULE ORAL NIGHTLY
Status: ON HOLD | COMMUNITY
End: 2020-05-12 | Stop reason: HOSPADM

## 2020-04-21 RX ORDER — HEPARIN SODIUM 5000 [USP'U]/ML
5000 INJECTION, SOLUTION INTRAVENOUS; SUBCUTANEOUS EVERY 8 HOURS SCHEDULED
Status: DISCONTINUED | OUTPATIENT
Start: 2020-04-21 | End: 2020-04-28

## 2020-04-21 RX ORDER — ALPRAZOLAM 1 MG/1
2 TABLET ORAL NIGHTLY PRN
Status: DISCONTINUED | OUTPATIENT
Start: 2020-04-21 | End: 2020-04-28 | Stop reason: HOSPADM

## 2020-04-21 RX ORDER — DEXTROSE MONOHYDRATE 50 MG/ML
100 INJECTION, SOLUTION INTRAVENOUS PRN
Status: DISCONTINUED | OUTPATIENT
Start: 2020-04-21 | End: 2020-04-28 | Stop reason: HOSPADM

## 2020-04-21 RX ORDER — NALOXONE HYDROCHLORIDE 1 MG/ML
2 INJECTION INTRAMUSCULAR; INTRAVENOUS; SUBCUTANEOUS ONCE
Status: COMPLETED | OUTPATIENT
Start: 2020-04-21 | End: 2020-04-21

## 2020-04-21 RX ORDER — SERTRALINE HYDROCHLORIDE 100 MG/1
200 TABLET, FILM COATED ORAL DAILY
Status: DISCONTINUED | OUTPATIENT
Start: 2020-04-21 | End: 2020-04-28 | Stop reason: HOSPADM

## 2020-04-21 RX ORDER — ASPIRIN 81 MG/1
81 TABLET, CHEWABLE ORAL DAILY
Status: DISCONTINUED | OUTPATIENT
Start: 2020-04-21 | End: 2020-04-24 | Stop reason: ALTCHOICE

## 2020-04-21 RX ORDER — SODIUM CHLORIDE 0.9 % (FLUSH) 0.9 %
10 SYRINGE (ML) INJECTION EVERY 12 HOURS SCHEDULED
Status: DISCONTINUED | OUTPATIENT
Start: 2020-04-21 | End: 2020-04-27

## 2020-04-21 RX ORDER — ALBUTEROL SULFATE 90 UG/1
2 AEROSOL, METERED RESPIRATORY (INHALATION) EVERY 4 HOURS PRN
Status: DISCONTINUED | OUTPATIENT
Start: 2020-04-21 | End: 2020-04-28 | Stop reason: HOSPADM

## 2020-04-21 RX ORDER — INSULIN GLARGINE 100 [IU]/ML
35 INJECTION, SOLUTION SUBCUTANEOUS NIGHTLY
Status: DISCONTINUED | OUTPATIENT
Start: 2020-04-21 | End: 2020-04-28 | Stop reason: HOSPADM

## 2020-04-21 RX ORDER — FUROSEMIDE 10 MG/ML
40 INJECTION INTRAMUSCULAR; INTRAVENOUS ONCE
Status: COMPLETED | OUTPATIENT
Start: 2020-04-21 | End: 2020-04-21

## 2020-04-21 RX ORDER — ALBUTEROL SULFATE 90 UG/1
2 AEROSOL, METERED RESPIRATORY (INHALATION) PRN
Status: DISCONTINUED | OUTPATIENT
Start: 2020-04-21 | End: 2020-04-21

## 2020-04-21 RX ORDER — GABAPENTIN 400 MG/1
400 CAPSULE ORAL 2 TIMES DAILY
Status: DISCONTINUED | OUTPATIENT
Start: 2020-04-22 | End: 2020-04-28 | Stop reason: HOSPADM

## 2020-04-21 RX ADMIN — Medication 10 ML: at 17:39

## 2020-04-21 RX ADMIN — DEXTROSE 50 % IN WATER (D50W) INTRAVENOUS SYRINGE 25 G: at 13:08

## 2020-04-21 RX ADMIN — INSULIN LISPRO 8 UNITS: 100 INJECTION, SOLUTION INTRAVENOUS; SUBCUTANEOUS at 17:36

## 2020-04-21 RX ADMIN — Medication 10 ML: at 21:09

## 2020-04-21 RX ADMIN — IOPAMIDOL 100 ML: 612 INJECTION, SOLUTION INTRAVENOUS at 12:45

## 2020-04-21 RX ADMIN — SODIUM CHLORIDE: 9 INJECTION, SOLUTION INTRAVENOUS at 17:35

## 2020-04-21 RX ADMIN — CLOPIDOGREL BISULFATE 75 MG: 75 TABLET ORAL at 21:08

## 2020-04-21 RX ADMIN — SODIUM CHLORIDE 1000 ML: 9 INJECTION, SOLUTION INTRAVENOUS at 11:14

## 2020-04-21 RX ADMIN — ASPIRIN 81 MG 81 MG: 81 TABLET ORAL at 21:08

## 2020-04-21 RX ADMIN — SODIUM POLYSTYRENE SULFONATE 30 G: 15 SUSPENSION ORAL; RECTAL at 13:16

## 2020-04-21 RX ADMIN — INSULIN HUMAN 10 UNITS: 100 INJECTION, SOLUTION PARENTERAL at 13:08

## 2020-04-21 RX ADMIN — HEPARIN SODIUM 5000 UNITS: 5000 INJECTION INTRAVENOUS; SUBCUTANEOUS at 17:36

## 2020-04-21 RX ADMIN — METOPROLOL TARTRATE 50 MG: 50 TABLET, FILM COATED ORAL at 21:08

## 2020-04-21 RX ADMIN — FUROSEMIDE 40 MG: 10 INJECTION, SOLUTION INTRAMUSCULAR; INTRAVENOUS at 17:35

## 2020-04-21 RX ADMIN — CALCIUM GLUCONATE 1 G: 98 INJECTION, SOLUTION INTRAVENOUS at 13:13

## 2020-04-21 RX ADMIN — INSULIN LISPRO 4 UNITS: 100 INJECTION, SOLUTION INTRAVENOUS; SUBCUTANEOUS at 21:05

## 2020-04-21 RX ADMIN — INSULIN GLARGINE 35 UNITS: 100 INJECTION, SOLUTION SUBCUTANEOUS at 21:04

## 2020-04-21 RX ADMIN — ATORVASTATIN CALCIUM 40 MG: 40 TABLET, FILM COATED ORAL at 21:08

## 2020-04-21 RX ADMIN — NALOXONE HYDROCHLORIDE 2 MG: 1 INJECTION PARENTERAL at 11:14

## 2020-04-21 RX ADMIN — SODIUM CHLORIDE 1000 ML: 9 INJECTION, SOLUTION INTRAVENOUS at 13:08

## 2020-04-21 ASSESSMENT — PAIN SCALES - GENERAL
PAINLEVEL_OUTOF10: 0

## 2020-04-21 ASSESSMENT — ENCOUNTER SYMPTOMS
DIARRHEA: 0
ABDOMINAL PAIN: 0
NAUSEA: 0
ALLERGIC/IMMUNOLOGIC NEGATIVE: 1
RESPIRATORY NEGATIVE: 1
RHINORRHEA: 0
SORE THROAT: 0
SHORTNESS OF BREATH: 0
PHOTOPHOBIA: 0
EYE PAIN: 0
VOMITING: 0
GASTROINTESTINAL NEGATIVE: 1
EYES NEGATIVE: 1
COUGH: 0
BACK PAIN: 0

## 2020-04-21 NOTE — CARE COORDINATION
Pt is very drowsy and unable to answer any discharge planning questions at this time. Pt did receive information on advance directives in Samoan with last admission. There are none completed in the chart yet. I was unable to reach pt's  by phone.

## 2020-04-21 NOTE — ED NOTES
Tele placed on pt.   Report given to [facility] on Fred Mccurdy, RN  04/21/20 6487

## 2020-04-21 NOTE — H&P
Chronic Venous Stasis with stasis dermatitis and diabetic foot ulcers/toe amputation: Elevate bilat lower exts, daily weights, compression stockings/ ACE wraps. Lac-hydrin ordered. Needs santyl to left heel. orthotics to offload heels. # Wound Location:  RIGHT THIRD TOE AMPUTATION SITE   Dressing orders: 1. Cleanse wound(s) with normal saline. 2. Apply dry SILVERCEL OR CALCIUM ALGINATE WITH Ag or eqivalent to wound bed. Gently tuck in undermining area. 3. Cover with 4x4's and wrap with gauze (makenzie or kerlix)    # Diabetic Neuropathy: Home dose Gabapentin, fall precautions. I personally spent estimated 60 minutes with this patient today. Additional work up or/and treatment plan may be added today or then after based on clinical progression. I am managing a portion of pt care. Some medical issues are handled by other specialists. Additional work up and treatment should be done in out pt setting by pt PCP and other out pt providers. In addition to examining and evaluating pt, I spent additional time explaining care, normaland abnormal findings, and treatment plan. All of pt questions were answered. Counseling, diet and education were provided. Case will be discussed with nursing staff when appropriate. Family will be updated if and when appropriate. Electronically signed by ESDRAS Schultz CNP on 4/21/2020 at 3:15 PM     Attending:  Agree with above.  See my separate documentation. - Nita Salguero DO

## 2020-04-21 NOTE — ED PROVIDER NOTES
systems reviewed and are negative. Except as noted above the remainder of the review of systems was reviewed and negative.        PAST MEDICAL HISTORY     Past Medical History:   Diagnosis Date    Asthma     CAD (coronary artery disease)     CKD (chronic kidney disease) stage 3, GFR 30-59 ml/min ([facility]     Colitis     Diabetes [facility]     Hyperlipidemia     Hypertension     PAD (peripheral artery disease) ([facility])     Prolonged emergence from general anesthesia     PVD (peripheral vascular disease) ([facility]     Thyroid disease      Past Surgical History:   Procedure Laterality Date    CARDIAC SURGERY       SECTION      COLONOSCOPY N/A 2019    COLONOSCOPY DIAGNOSTIC performed by Mario Stone MD at [address] [place] [address] GRAFT  2019    unknown vessels    HYSTERECTOMY      TOE AMPUTATION Right     3rd toe     Social History     Socioeconomic History    Marital status:      Spouse name: Not on file    Number of children: Not on file    Years of education: Not on file    Highest education level: Not on file   Occupational History    Not on file   Social Needs    Financial resource strain: Not on file    Food insecurity     Worry: Not on file     Inability: Not on file    Transportation needs     Medical: Not on file     Non-medical: Not on file   Tobacco Use    Smoking status: Never Smoker    Smokeless tobacco: Never Used   Substance and Sexual Activity    Alcohol use: Never     Frequency: Never    Drug use: Never    Sexual activity: Not on file   Lifestyle    Physical activity     Days per week: Not on file     Minutes per session: Not on file    Stress: Not on file   Relationships    Social connections     Talks on phone: Not on file     Gets together: Not on file     Attends Anab[employer]ist service: Not on file     Active member of club or organization: Not on file     Attends meetings of clubs or organizations: Not on file

## 2020-04-21 NOTE — ED NOTES
Crea 1.7. CT notified. Per CT, GFR is calculated to 30.5, where contrast is contradicted. Per Elaine Cespedes NP, waiting for [facility] to come back to compare results.      Sulaiman Garnica RN  04/21/20 3671

## 2020-04-21 NOTE — ED NOTES
Resp at bedside for VGB. Blood and flu swab labeled and sent to bed. Pt remains lethargic after narcan administration, but alert.      Harsh Medeiros RN  04/21/20 8142

## 2020-04-21 NOTE — ED NOTES
Pt awake, alert. Pt ambulatory to bathroom with very minimal assistance. Pt back to bed and back on cardiac monitor. 750 mL clear yellow urine voided. Urine labeled and sent to lab.      Wilma Rodriguez RN  04/21/20 4530

## 2020-04-21 NOTE — ED NOTES
Pt resting in bed with no signs of distress. Denies any needs or complaints at this time.      Shilo Ely, SALMA  04/21/20 8645

## 2020-04-22 LAB
ALBUMIN SERPL-MCNC: 3.1 G/DL (ref 3.5–4.6)
ALP BLD-CCNC: 104 U/L (ref 40–130)
ALT SERPL-CCNC: 28 U/L (ref 0–33)
ANION GAP SERPL CALCULATED.3IONS-SCNC: 12 MEQ/L (ref 9–15)
AST SERPL-CCNC: 25 U/L (ref 0–35)
BASOPHILS ABSOLUTE: 0.1 K/UL (ref 0–0.2)
BASOPHILS RELATIVE PERCENT: 0.9 %
BILIRUB SERPL-MCNC: 0.3 MG/DL (ref 0.2–0.7)
BUN BLDV-MCNC: 40 MG/DL (ref 8–23)
CALCIUM SERPL-MCNC: 8.9 MG/DL (ref 8.5–9.9)
CHLORIDE BLD-SCNC: 102 MEQ/L (ref 95–107)
CO2: 26 MEQ/L (ref 20–31)
CREAT SERPL-MCNC: 1.36 MG/DL (ref 0.5–0.9)
EOSINOPHILS ABSOLUTE: 0.3 K/UL (ref 0–0.7)
EOSINOPHILS RELATIVE PERCENT: 4.6 %
GFR AFRICAN AMERICAN: 37
GFR AFRICAN AMERICAN: 47.6
GFR NON-AFRICAN AMERICAN: 30
GFR NON-AFRICAN AMERICAN: 39.3
GLOBULIN: 3.4 G/DL (ref 2.3–3.5)
GLUCOSE BLD-MCNC: 174 MG/DL (ref 60–115)
GLUCOSE BLD-MCNC: 175 MG/DL (ref 60–115)
GLUCOSE BLD-MCNC: 182 MG/DL (ref 60–115)
GLUCOSE BLD-MCNC: 249 MG/DL (ref 60–115)
GLUCOSE BLD-MCNC: 253 MG/DL (ref 70–99)
HCT VFR BLD CALC: 32 % (ref 37–47)
HEMOGLOBIN: 10.6 G/DL (ref 12–16)
LV EF: 43 %
LVEF MODALITY: NORMAL
LYMPHOCYTES ABSOLUTE: 1.8 K/UL (ref 1–4.8)
LYMPHOCYTES RELATIVE PERCENT: 24.6 %
MCH RBC QN AUTO: 25.3 PG (ref 27–31.3)
MCHC RBC AUTO-ENTMCNC: 33.2 % (ref 33–37)
MCV RBC AUTO: 76.1 FL (ref 82–100)
MONOCYTES ABSOLUTE: 0.9 K/UL (ref 0.2–0.8)
MONOCYTES RELATIVE PERCENT: 12.6 %
NEUTROPHILS ABSOLUTE: 4.2 K/UL (ref 1.4–6.5)
NEUTROPHILS RELATIVE PERCENT: 57.3 %
PDW BLD-RTO: 18.3 % (ref 11.5–14.5)
PERFORMED ON: ABNORMAL
PLATELET # BLD: 160 K/UL (ref 130–400)
POC CREATININE: 1.7 MG/DL (ref 0.6–1.2)
POC SAMPLE TYPE: ABNORMAL
POTASSIUM REFLEX MAGNESIUM: 5.1 MEQ/L (ref 3.4–4.9)
RBC # BLD: 4.2 M/UL (ref 4.2–5.4)
SODIUM BLD-SCNC: 140 MEQ/L (ref 135–144)
TOTAL PROTEIN: 6.5 G/DL (ref 6.3–8)
WBC # BLD: 7.2 K/UL (ref 4.8–10.8)

## 2020-04-22 PROCEDURE — 2060000000 HC ICU INTERMEDIATE R&B

## 2020-04-22 PROCEDURE — 93306 TTE W/DOPPLER COMPLETE: CPT

## 2020-04-22 PROCEDURE — 6370000000 HC RX 637 (ALT 250 FOR IP): Performed by: INTERNAL MEDICINE

## 2020-04-22 PROCEDURE — 85025 COMPLETE CBC W/AUTO DIFF WBC: CPT

## 2020-04-22 PROCEDURE — 96372 THER/PROPH/DIAG INJ SC/IM: CPT

## 2020-04-22 PROCEDURE — 6370000000 HC RX 637 (ALT 250 FOR IP): Performed by: NURSE PRACTITIONER

## 2020-04-22 PROCEDURE — 6360000002 HC RX W HCPCS: Performed by: NURSE PRACTITIONER

## 2020-04-22 PROCEDURE — 36415 COLL VENOUS BLD VENIPUNCTURE: CPT

## 2020-04-22 PROCEDURE — 80053 COMPREHEN METABOLIC PANEL: CPT

## 2020-04-22 PROCEDURE — 2580000003 HC RX 258: Performed by: NURSE PRACTITIONER

## 2020-04-22 PROCEDURE — 93010 ELECTROCARDIOGRAM REPORT: CPT | Performed by: INTERNAL MEDICINE

## 2020-04-22 RX ORDER — AMLODIPINE BESYLATE 5 MG/1
5 TABLET ORAL DAILY
Status: DISCONTINUED | OUTPATIENT
Start: 2020-04-22 | End: 2020-04-24

## 2020-04-22 RX ORDER — TRAMADOL HYDROCHLORIDE 50 MG/1
50 TABLET ORAL EVERY 6 HOURS PRN
Status: DISCONTINUED | OUTPATIENT
Start: 2020-04-22 | End: 2020-04-28 | Stop reason: HOSPADM

## 2020-04-22 RX ORDER — FUROSEMIDE 40 MG/1
40 TABLET ORAL DAILY
Status: DISCONTINUED | OUTPATIENT
Start: 2020-04-22 | End: 2020-04-23

## 2020-04-22 RX ORDER — METOPROLOL SUCCINATE 50 MG/1
50 TABLET, EXTENDED RELEASE ORAL DAILY
Status: DISCONTINUED | OUTPATIENT
Start: 2020-04-23 | End: 2020-04-28

## 2020-04-22 RX ADMIN — METOPROLOL TARTRATE 50 MG: 50 TABLET, FILM COATED ORAL at 09:19

## 2020-04-22 RX ADMIN — ASPIRIN 81 MG 81 MG: 81 TABLET ORAL at 09:19

## 2020-04-22 RX ADMIN — HEPARIN SODIUM 5000 UNITS: 5000 INJECTION INTRAVENOUS; SUBCUTANEOUS at 20:03

## 2020-04-22 RX ADMIN — INSULIN LISPRO 6 UNITS: 100 INJECTION, SOLUTION INTRAVENOUS; SUBCUTANEOUS at 09:20

## 2020-04-22 RX ADMIN — FOLIC ACID 1 MG: 1 TABLET ORAL at 09:19

## 2020-04-22 RX ADMIN — INSULIN LISPRO 2 UNITS: 100 INJECTION, SOLUTION INTRAVENOUS; SUBCUTANEOUS at 11:49

## 2020-04-22 RX ADMIN — CLOPIDOGREL BISULFATE 75 MG: 75 TABLET ORAL at 09:19

## 2020-04-22 RX ADMIN — HEPARIN SODIUM 5000 UNITS: 5000 INJECTION INTRAVENOUS; SUBCUTANEOUS at 02:41

## 2020-04-22 RX ADMIN — AMLODIPINE BESYLATE 5 MG: 5 TABLET ORAL at 09:19

## 2020-04-22 RX ADMIN — CYANOCOBALAMIN TAB 500 MCG 1000 MCG: 500 TAB at 09:19

## 2020-04-22 RX ADMIN — ACETAMINOPHEN 650 MG: 325 TABLET ORAL at 15:07

## 2020-04-22 RX ADMIN — SERTRALINE 200 MG: 100 TABLET, FILM COATED ORAL at 09:19

## 2020-04-22 RX ADMIN — GABAPENTIN 400 MG: 400 CAPSULE ORAL at 15:08

## 2020-04-22 RX ADMIN — GABAPENTIN 400 MG: 400 CAPSULE ORAL at 09:19

## 2020-04-22 RX ADMIN — MUPIROCIN: 20 OINTMENT TOPICAL at 21:34

## 2020-04-22 RX ADMIN — ATORVASTATIN CALCIUM 40 MG: 40 TABLET, FILM COATED ORAL at 20:05

## 2020-04-22 RX ADMIN — ALPRAZOLAM 2 MG: 1 TABLET ORAL at 02:41

## 2020-04-22 RX ADMIN — COLLAGENASE SANTYL: 250 OINTMENT TOPICAL at 09:19

## 2020-04-22 RX ADMIN — HEPARIN SODIUM 5000 UNITS: 5000 INJECTION INTRAVENOUS; SUBCUTANEOUS at 11:48

## 2020-04-22 RX ADMIN — AMITRIPTYLINE HYDROCHLORIDE 25 MG: 25 TABLET, FILM COATED ORAL at 20:05

## 2020-04-22 RX ADMIN — Medication 10 ML: at 20:10

## 2020-04-22 RX ADMIN — LEVOTHYROXINE SODIUM 50 MCG: 0.05 TABLET ORAL at 06:29

## 2020-04-22 RX ADMIN — FUROSEMIDE 40 MG: 40 TABLET ORAL at 15:08

## 2020-04-22 RX ADMIN — GABAPENTIN 800 MG: 400 CAPSULE ORAL at 20:05

## 2020-04-22 RX ADMIN — INSULIN GLARGINE 35 UNITS: 100 INJECTION, SOLUTION SUBCUTANEOUS at 21:33

## 2020-04-22 RX ADMIN — Medication 10 ML: at 09:19

## 2020-04-22 RX ADMIN — MUPIROCIN: 20 OINTMENT TOPICAL at 09:19

## 2020-04-22 ASSESSMENT — PAIN DESCRIPTION - PAIN TYPE: TYPE: ACUTE PAIN

## 2020-04-22 ASSESSMENT — PAIN SCALES - GENERAL
PAINLEVEL_OUTOF10: 0
PAINLEVEL_OUTOF10: 4
PAINLEVEL_OUTOF10: 0

## 2020-04-22 ASSESSMENT — PAIN DESCRIPTION - DESCRIPTORS: DESCRIPTORS: ACHING

## 2020-04-22 ASSESSMENT — PAIN DESCRIPTION - LOCATION: LOCATION: HEAD

## 2020-04-22 NOTE — FLOWSHEET NOTE
Patient lethargic but arousable. RN attempted to use  with patient but patient falling asleep in between questions. Patient denies pain. Electronically signed by Jimbo Mercer RN on 4/21/2020 at 8:53 PM    2108: Patient is up with steady gait to bathroom, uses walker. Oral hygiene provided. Patient repositioned in bed. Patient denies pain. Hospitalist aware of critical lactic acid. Electronically signed by Jimbo Mercer RN on 4/21/2020 at 9:58 PM    [phone]: Patient very tearful. Patient said she forgot she was here and became upset. Patient is redirected and comfort provided. Xanax PO given.  used.  Electronically signed by Jimbo Mercer RN on 4/22/2020 at 2:53 AM

## 2020-04-22 NOTE — PROGRESS NOTES
PRN ESDRAS Starks - CNP        insulin lispro (HUMALOG) injection vial 0-12 Units  0-12 Units Subcutaneous TID WC ESDRAS Starks CNP   2 Units at 04/22/20 1149    insulin lispro (HUMALOG) injection vial 0-6 Units  0-6 Units Subcutaneous Nightly ESDRAS Starks CNP   4 Units at 04/21/20 2105    clopidogrel (PLAVIX) tablet 75 mg  75 mg Oral Daily ESDRAS Starks CNP   75 mg at 04/22/20 0919    ALPRAZolam (XANAX) tablet 2 mg  2 mg Oral Nightly PRN ESDRAS Starks CNP   2 mg at 04/22/20 0241    amitriptyline (ELAVIL) tablet 25 mg  25 mg Oral Nightly ESDRAS Starks CNP   Stopped at 04/21/20 2058    aspirin chewable tablet 81 mg  81 mg Oral Daily ESDRAS Starks CNP   81 mg at 04/22/20 0919    atorvastatin (LIPITOR) tablet 40 mg  40 mg Oral Daily Donal Barr APRN - CNP   40 mg at 04/21/20 2108    vitamin B-12 (CYANOCOBALAMIN) tablet 1,000 mcg  1,000 mcg Oral Daily ESDRAS Starks CNP   1,000 mcg at 57/77/82 2548    folic acid (FOLVITE) tablet 1 mg  1 mg Oral Daily ESDRAS Starks - CNP   1 mg at 04/22/20 0919    hydrOXYzine (VISTARIL) capsule 50 mg  50 mg Oral TID PRN ESDRAS Starks - THEO        gabapentin (NEURONTIN) capsule 400 mg  400 mg Oral BID ESDRAS Starks CNP   400 mg at 04/22/20 0919    gabapentin (NEURONTIN) capsule 800 mg  800 mg Oral Nightly ESDRAS Starks CNP   Stopped at 04/21/20 2306    insulin glargine (LANTUS) injection vial 35 Units  35 Units Subcutaneous Nightly ESDRAS Starks CNP   35 Units at 04/21/20 2104    insulin lispro (HUMALOG) injection vial 10 Units  10 Units Subcutaneous TID AC ESDRAS Starks CNP   10 Units at 04/22/20 1148    levothyroxine (SYNTHROID) tablet 50 mcg  50 mcg Oral Daily ESDRAS Starks CNP   50 mcg at 04/22/20 0860    sertraline (ZOLOFT) tablet 200 mg  200 mg Oral Daily ESDRAS Starks CNP   200 mg at 04/22/20 0919    mupirocin (BACTROBAN) 2 % ointment

## 2020-04-22 NOTE — CARE COORDINATION
The  states the patient is sleeping and he will return to meet with the patient regarding the HCPOA at a later time. Electronically signed by ALMAZ Barros on 4/22/20 at 4:10 PM EDT     The LSW met with the patient regarding the prescriptions for a hospital bed, wheel chair and shower chair. Freedom of choice regarding equipment was discussed. The patient would like Medical Services if they take her insurance. Per Muriel Lord takes the patient's insurance. The LSW faxed the scripts, face sheet and physician notes to Lurena Olszewski at [phone]. Electronically signed by Sabina Rivera on 4/22/20 at 4:26 PM EDT    The LSW confirmed Muriel Lord, received the fax.   Electronically signed by ALMAZ Barros on 4/22/20 at 4:32 PM EDT

## 2020-04-22 NOTE — CARE COORDINATION
CALL TO [facility] AND PT IS CURRENT WITH [facility] FOR SN ONLY.  DR Padron Nicely FOR [address] BED

## 2020-04-23 PROBLEM — I25.5 ISCHEMIC CARDIOMYOPATHY: Status: ACTIVE | Noted: 2020-04-23

## 2020-04-23 PROBLEM — I34.0 SEVERE MITRAL REGURGITATION: Status: ACTIVE | Noted: 2020-04-23

## 2020-04-23 PROBLEM — I27.20 PULMONARY HYPERTENSION (HCC): Status: ACTIVE | Noted: 2020-04-23

## 2020-04-23 LAB
ANION GAP SERPL CALCULATED.3IONS-SCNC: 14 MEQ/L (ref 9–15)
BUN BLDV-MCNC: 38 MG/DL (ref 8–23)
CALCIUM SERPL-MCNC: 9.2 MG/DL (ref 8.5–9.9)
CHLORIDE BLD-SCNC: 100 MEQ/L (ref 95–107)
CO2: 26 MEQ/L (ref 20–31)
CREAT SERPL-MCNC: 1.3 MG/DL (ref 0.5–0.9)
GFR AFRICAN AMERICAN: 50.1
GFR NON-AFRICAN AMERICAN: 41.4
GLUCOSE BLD-MCNC: 118 MG/DL (ref 70–99)
GLUCOSE BLD-MCNC: 130 MG/DL (ref 60–115)
GLUCOSE BLD-MCNC: 209 MG/DL (ref 60–115)
GLUCOSE BLD-MCNC: 92 MG/DL (ref 60–115)
GLUCOSE BLD-MCNC: 97 MG/DL (ref 60–115)
PERFORMED ON: ABNORMAL
PERFORMED ON: ABNORMAL
PERFORMED ON: NORMAL
PERFORMED ON: NORMAL
POTASSIUM REFLEX MAGNESIUM: 4.5 MEQ/L (ref 3.4–4.9)
SODIUM BLD-SCNC: 140 MEQ/L (ref 135–144)

## 2020-04-23 PROCEDURE — 6370000000 HC RX 637 (ALT 250 FOR IP): Performed by: INTERNAL MEDICINE

## 2020-04-23 PROCEDURE — 2060000000 HC ICU INTERMEDIATE R&B

## 2020-04-23 PROCEDURE — 6370000000 HC RX 637 (ALT 250 FOR IP): Performed by: NURSE PRACTITIONER

## 2020-04-23 PROCEDURE — 97165 OT EVAL LOW COMPLEX 30 MIN: CPT

## 2020-04-23 PROCEDURE — 2580000003 HC RX 258: Performed by: NURSE PRACTITIONER

## 2020-04-23 PROCEDURE — 80048 BASIC METABOLIC PNL TOTAL CA: CPT

## 2020-04-23 PROCEDURE — 36415 COLL VENOUS BLD VENIPUNCTURE: CPT

## 2020-04-23 PROCEDURE — 2580000003 HC RX 258: Performed by: INTERNAL MEDICINE

## 2020-04-23 PROCEDURE — 6360000002 HC RX W HCPCS: Performed by: INTERNAL MEDICINE

## 2020-04-23 PROCEDURE — 6360000002 HC RX W HCPCS: Performed by: NURSE PRACTITIONER

## 2020-04-23 PROCEDURE — 97161 PT EVAL LOW COMPLEX 20 MIN: CPT

## 2020-04-23 PROCEDURE — 94762 N-INVAS EAR/PLS OXIMTRY CONT: CPT

## 2020-04-23 RX ORDER — HYDRALAZINE HYDROCHLORIDE 25 MG/1
25 TABLET, FILM COATED ORAL EVERY 8 HOURS SCHEDULED
Status: DISCONTINUED | OUTPATIENT
Start: 2020-04-23 | End: 2020-04-24

## 2020-04-23 RX ORDER — ISOSORBIDE DINITRATE 20 MG/1
20 TABLET ORAL 3 TIMES DAILY
Status: DISCONTINUED | OUTPATIENT
Start: 2020-04-23 | End: 2020-04-28 | Stop reason: HOSPADM

## 2020-04-23 RX ORDER — DIAZEPAM 5 MG/1
5 TABLET ORAL
Status: COMPLETED | OUTPATIENT
Start: 2020-04-23 | End: 2020-04-27

## 2020-04-23 RX ORDER — SODIUM CHLORIDE 0.9 % (FLUSH) 0.9 %
10 SYRINGE (ML) INJECTION PRN
Status: DISCONTINUED | OUTPATIENT
Start: 2020-04-23 | End: 2020-04-28 | Stop reason: HOSPADM

## 2020-04-23 RX ORDER — SODIUM CHLORIDE 9 MG/ML
INJECTION, SOLUTION INTRAVENOUS CONTINUOUS
Status: DISCONTINUED | OUTPATIENT
Start: 2020-04-23 | End: 2020-04-24

## 2020-04-23 RX ORDER — PREDNISONE 50 MG/1
50 TABLET ORAL ONCE
Status: DISCONTINUED | OUTPATIENT
Start: 2020-04-23 | End: 2020-04-28 | Stop reason: HOSPADM

## 2020-04-23 RX ORDER — DIPHENHYDRAMINE HCL 25 MG
50 TABLET ORAL ONCE
Status: DISCONTINUED | OUTPATIENT
Start: 2020-04-23 | End: 2020-04-28 | Stop reason: HOSPADM

## 2020-04-23 RX ORDER — NITROGLYCERIN 0.4 MG/1
0.4 TABLET SUBLINGUAL EVERY 5 MIN PRN
Status: DISCONTINUED | OUTPATIENT
Start: 2020-04-23 | End: 2020-04-28 | Stop reason: HOSPADM

## 2020-04-23 RX ORDER — SODIUM CHLORIDE 0.9 % (FLUSH) 0.9 %
10 SYRINGE (ML) INJECTION EVERY 12 HOURS SCHEDULED
Status: DISCONTINUED | OUTPATIENT
Start: 2020-04-23 | End: 2020-04-28 | Stop reason: HOSPADM

## 2020-04-23 RX ADMIN — GABAPENTIN 800 MG: 400 CAPSULE ORAL at 21:00

## 2020-04-23 RX ADMIN — AMLODIPINE BESYLATE 5 MG: 5 TABLET ORAL at 08:28

## 2020-04-23 RX ADMIN — HYDRALAZINE HYDROCHLORIDE 25 MG: 25 TABLET, FILM COATED ORAL at 15:49

## 2020-04-23 RX ADMIN — FOLIC ACID 1 MG: 1 TABLET ORAL at 08:28

## 2020-04-23 RX ADMIN — HYDRALAZINE HYDROCHLORIDE 25 MG: 25 TABLET, FILM COATED ORAL at 21:02

## 2020-04-23 RX ADMIN — FUROSEMIDE 2 MG/HR: 10 INJECTION, SOLUTION INTRAVENOUS at 23:51

## 2020-04-23 RX ADMIN — ATORVASTATIN CALCIUM 40 MG: 40 TABLET, FILM COATED ORAL at 21:00

## 2020-04-23 RX ADMIN — SERTRALINE 200 MG: 100 TABLET, FILM COATED ORAL at 12:56

## 2020-04-23 RX ADMIN — METOPROLOL SUCCINATE 50 MG: 50 TABLET, EXTENDED RELEASE ORAL at 12:56

## 2020-04-23 RX ADMIN — FUROSEMIDE 40 MG: 40 TABLET ORAL at 13:05

## 2020-04-23 RX ADMIN — Medication 10 ML: at 21:05

## 2020-04-23 RX ADMIN — ISOSORBIDE DINITRATE 20 MG: 20 TABLET ORAL at 17:46

## 2020-04-23 RX ADMIN — CYANOCOBALAMIN TAB 500 MCG 1000 MCG: 500 TAB at 12:56

## 2020-04-23 RX ADMIN — Medication 10 ML: at 21:04

## 2020-04-23 RX ADMIN — ASPIRIN 81 MG 81 MG: 81 TABLET ORAL at 08:28

## 2020-04-23 RX ADMIN — COLLAGENASE SANTYL: 250 OINTMENT TOPICAL at 08:30

## 2020-04-23 RX ADMIN — ISOSORBIDE DINITRATE 20 MG: 20 TABLET ORAL at 21:00

## 2020-04-23 RX ADMIN — AMITRIPTYLINE HYDROCHLORIDE 25 MG: 25 TABLET, FILM COATED ORAL at 21:00

## 2020-04-23 RX ADMIN — MUPIROCIN: 20 OINTMENT TOPICAL at 22:06

## 2020-04-23 RX ADMIN — CLOPIDOGREL BISULFATE 75 MG: 75 TABLET ORAL at 08:28

## 2020-04-23 RX ADMIN — INSULIN LISPRO 4 UNITS: 100 INJECTION, SOLUTION INTRAVENOUS; SUBCUTANEOUS at 13:00

## 2020-04-23 RX ADMIN — GABAPENTIN 400 MG: 400 CAPSULE ORAL at 15:49

## 2020-04-23 RX ADMIN — HEPARIN SODIUM 5000 UNITS: 5000 INJECTION INTRAVENOUS; SUBCUTANEOUS at 12:55

## 2020-04-23 RX ADMIN — HEPARIN SODIUM 5000 UNITS: 5000 INJECTION INTRAVENOUS; SUBCUTANEOUS at 21:00

## 2020-04-23 RX ADMIN — LEVOTHYROXINE SODIUM 50 MCG: 0.05 TABLET ORAL at 06:14

## 2020-04-23 RX ADMIN — Medication 10 ML: at 13:04

## 2020-04-23 RX ADMIN — GABAPENTIN 400 MG: 400 CAPSULE ORAL at 08:28

## 2020-04-23 RX ADMIN — HEPARIN SODIUM 5000 UNITS: 5000 INJECTION INTRAVENOUS; SUBCUTANEOUS at 04:25

## 2020-04-23 ASSESSMENT — PAIN SCALES - GENERAL: PAINLEVEL_OUTOF10: 0

## 2020-04-23 NOTE — PROGRESS NOTES
[facility] OF THE [facility] [place] Note      Patient: Cordelia Peabody  Unit/Bed: Z464/S052-43  YOB: 1955  MRN: [id number]  Admit Date:  4/21/2020  HOSPITAL day #       Subjective Complaint:   No CP or SOB at rest.  . Physical Examination:     BP (!) 145/80   Pulse 68   Temp 97.4 °F (36.3 °C) (Oral)   Resp 18   Ht 5' 4\" (1.626 m)   Wt 235 lb (106.6 kg)   SpO2 95%   BMI 40.34 kg/m²     No intake or output data in the 24 hours ending 04/23/20 1236  Weights  Wt Readings from Last 3 Encounters:   04/23/20 235 lb (106.6 kg)   03/04/20 237 lb (107.5 kg)   02/27/20 243 lb 3.2 oz (110.3 kg)       General:  Awake, alert, oriented X 3. No apparent distress. Heart:  Regular rate Regular rhythm, S1> S2 no murmurs, gallops, or rubs. Lungs: CTA bilaterally, bilat symmetrical expansion, no wheeze, rales, or rhonchi. Abdomen: Bowel sounds present, soft, nontender,  no peritoneal signs  Extremities:  No clubbing 1+ edema. Skin: Warm and dry  Mood and affect: Appropriate for the circumstance. Telemetry:      normal sinus  ,isolated PVCs. LABS:   CBC:   Recent Labs     04/21/20  1100 04/21/20  1123 04/22/20  0605   WBC 7.7  --  7.2   HGB 11.1* 12.2 10.6*     --  160      BMP:    Recent Labs     04/21/20  1824 04/22/20  0605 04/23/20  0638   * 140 140   K 5.2* 5.1* 4.5   CL 95 102 100   CO2 22 26 26   BUN 43* 40* 38*   CREATININE 1.53* 1.36* 1.30*   GLUCOSE 355* 253* 118*              Troponin: No results for input(s): TROPONINT in the last 72 hours. BNP:   Recent Labs     04/21/20  1215   PROBNP 4,202      INR:   Recent Labs     04/21/20  1100   INR 1.0      Mg:   Recent Labs     04/21/20  1100   MG 2.1       Cardiac Testing:    ECHO report in chart    Assessment:  1. Dizziness/fell/issue of syncope is unclear. Patient was groggy,room dark and she had taked Vistaril. Has ?sleep apnoea  2. Negative troponin,no9 acute EKG abnormality,doubt ACS.   3. CHF,deterioration of LVef from 6/19

## 2020-04-23 NOTE — CONSULTS
[facility] HEART CARDIOLOGY CONSULTATION NOTE    PATIENT NAME: Myriam Hollis  PATIENT MRN: [id number]  SERVICE DATE:  4/23/2020  SERVICE TIME: 10:49 AM    PRIMARY CARE PHYSICIAN: Ventura Spann  CONSULTING CARDIOLOGIST : Papa Lord [place] [facility] [place]  PRIMARY CARDIOLOGIST: Guillermina Russo MD [facility] [place]  ================================================================    REASON FOR CONSULTATION:      Dizziness,?syncope     HPI:   Myriam Hollis is a 60 y.o. female who presented per ER notes Myriam Hollis is a 60 y.o. female who presents to the emergency department for evaluation of ams, fall/syncope while at home. This was earlier this morning, \"still dark. \"  Pt currently alert and oriented. No current complaints. Admits to sensation of dizziness then loc. No chest pain, sob, palpitations. Family noted when pt was supine following syncopal episode her lips and face turned blue and then she began to breathe again. Pt has no recollection of this and feels like her normal self/health.          Pt remembers that room was dark,that she was groggy,that she had taken Vistaril the night before did not hit head,no palpitations or chest pressure. Has diarrhea,chronic,5 times a day,very watery stool,no blood or mucus,no nausea or vomiting. Fragmented sleep,probable sleep apnea. CARDIAC HISTORY:    Per last Ov with  2/20:  IMPRESSION:     Right third toe amputation site infection. CAD post CABG June 2019 [facility], LIMA to LAD, PCI to left circumflex and right coronary artery (reported but no documentation). Ischemic cardiomyopathy with apical hypokinesia LVEF 50%. PVOD post right lower extremity revascularization,  and [facility] Dr Wendi Bosch, (details not available)  Carotid artery disease with ultrasound noted 50-69% bilateral stenoses May 2019.   Syncope, presyncope  History of heart failure  Hypothyroidism  Hypertension  Hyperlipidemia  Diabetes  Depression  Schizophrenia  Family history of coronary artery disease  Multiple allergies as noted  Otherwise as per assessment below. RECOMMENDATIONS:     [facility] Dr. Rebecca Vanessa regarding his peripheral vascular care. She'll continue her current medications. Refills were provided. Exercise dietary program. Hydration. Follow my health portal was encouraged. We will plan to see back in 3 months with Laboratory Studies and ECG as noted below. Patient will follow up with their primary physician for general care. The patient knows to contact medical care earlier if need be. Ana Lilia Blackwell MD, [address] [facility] Cardiology    Additional cardiac history :     Off pump CABG LIMA TO lad,then AMINA distal LMCA 3.5x15 Xience Concha   AMINA ostial prox left Circ 3.0x33 Xience Concha AMINA 6/19 [facility]     Pre CABG ISAAC normal LVef and 1+ MR.    RSFA ,right popliteal and right anterior tibial PTA 6/19    Amputation right 3rd toe 2019      Height: 64 inches Weight: 240 pounds BSA: 2.11 m^2 BMI: 41.2 kg/m^2  BP: 153/71 mmHg   Conclusions   Summary   Left ventricular size is mildly increased . Mild concentric left ventricular hypertrophy. Left ventricular ejection fraction is visually estimated at 40-45%. Pseudonormal filling pattern noted. Moderate hypokinesis of the lateral segment with mild to moderate   impairment of LV systolic function. Severe annular calcification. Severe (3+) mitral regurgitation is present. Aortic valve leaflets are mildly thickened. The aortic valve area is 1.8 cm2 with a maximum gradient of 6 mmHg and a   mean gradient of 4 mmHg. --trivial to mild aortic stenosis   Trivial aortic regurgitation is noted.    There is moderate ( 2 +) tricuspid regurgitation with estimated RVSP of 68   mm Hg.--moderately severe pulmonary hypertension   Moderate pulmonic regurgitation APRN - CNP   800 mg at 04/22/20 2005    insulin glargine (LANTUS) injection vial 35 Units  35 Units Subcutaneous Nightly Cinthya Dominguez, APRN - CNP   35 Units at 04/22/20 2133    insulin lispro (HUMALOG) injection vial 10 Units  10 Units Subcutaneous TID AC Cinthya WaldropJAQUELIN tinsleyN - CNP   10 Units at 04/23/20 7236    levothyroxine (SYNTHROID) tablet 50 mcg  50 mcg Oral Daily Cinthya JAQUELIN DominguezN - CNP   50 mcg at 04/23/20 9247    sertraline (ZOLOFT) tablet 200 mg  200 mg Oral Daily Cinthya WaldropESDRAS tinsley - CNP   200 mg at 04/22/20 0919    mupirocin (BACTROBAN) 2 % ointment   Topical TID ESDRAS Quigley CNP        collagenase ointment   Topical Daily RafaelESDRAS Rogers CNP        albuterol sulfate  (90 Base) MCG/ACT inhaler 2 puff  2 puff Inhalation Q4H PRN ESDRAS Quigley CNP             ALLERGIES AND DRUG INTOLERANCES:   Allergies   Allergen Reactions    Ambien [Zolpidem Tartrate]     Capoten [Captopril]     Clioquinol     Cogentin [Benztropine]     Depakote [Divalproex Sodium]     Effexor Xr [Venlafaxine Hcl Er]     Geodon [Ziprasidone Hcl]     Lisinopril      Hyperkalemia: 4/21/20 potassium was 6.7    Lyrica [Pregabalin]     Navane [Thiothixene]     Pamelor [Nortriptyline Hcl]     Remeron [Mirtazapine]     Risperdal [Risperidone]     Trazodone And Nefazodone     Wellbutrin [Bupropion]        ROS:   Review of Systems  Review of Systems   Constitutional: Negative for chills, diaphoresis, fatigue and fever. HENT: Negative for congestion, rhinorrhea and sore throat. Eyes: Negative for photophobia and pain. Respiratory: Negative for cough and shortness of breath. Cardiovascular: Negative for chest pain and palpitations. Gastrointestinal: Negative for abdominal pain, diarrhea, nausea and vomiting. Genitourinary: Negative for dysuria and flank pain. Musculoskeletal: Negative for back pain. Skin: Negative for rash. Neurological: Positive for syncope.  Positive  for findings. Psychiatric:         Thought Content: Thought content normal.         Judgment: Judgment normal.         EKG:  EKG: NSR,PRWP,inferolateral ST/T abn,pattern of prior anteroseptal MI. .    Imaging results:    Ct Head Wo Contrast    Result Date: 4/21/2020  EXAMINATION: CT of the brain without contrast HISTORY: Dizziness. Altered mental status. Fall. COMPARISON: None available TECHNIQUE: Multiple contiguous axial images were obtained of the brain from the skull base through the vertex. Multiplanar reformats were obtained. FINDINGS: Prominence of the sulci and ventricles compatible with mild generalized parenchymal volume loss. Gray-white matter differentiation is preserved. No acute hemorrhage or abnormal extra-axial fluid collection. No mass effect or midline shift. The visualized paranasal sinuses and mastoid air cells are clear. Calvarium is intact. No acute intracranial process. All CT scans at this facility use dose modulation, iterative reconstruction, and/or weight based dosing when appropriate to reduce radiation dose to as low as reasonably achievable. Cta Chest W Wo Contrast    Result Date: 4/21/2020  EXAM: CTA CHEST W WO CONTRAST History: Chest pain. Shortness of breath. Technique: Multiple contiguous axial images of the chest were obtained from the thoracic inlet through the upper abdomen with contrast. Multiplanar reformats including axial and coronal maximum intensity projection images were obtained. Comparison: Chest x-rays from February 11, 2020 Findings: Visualized portion of the thyroid gland is within normal limits. No axillary or mediastinal lymphadenopathy. Mildly enlarged bilateral hilar lymph nodes noted. No thoracic aortic aneurysm. Atherosclerotic ossification of the thoracic aorta. No large central pulmonary embolism identified. Respiratory motion artifact at the bases precludes optimal evaluation for segmental pulmonary emboli of the bilateral lower lobes.  There is reflux of syncope is unclear. Patient was groggy,room dark and she had taked Vistaril. Has ?sleep apnoea  2. Negative troponin,no9 acute EKG abnormality,doubt ACS. 3. CHF,deterioration of LVef from 6/19 with worse MR and pulmonary hypertension . Possible that ostial circ stent is occluded with ischemic MR and pulmonary hypertension that is multifactorial  4. No angina. 5. Elevated BNP  6. Abnormal Chest CT   7. CKD   8. Extensive vascular disease  9. Tendency for hyperkalemia ,hence will be difficult to maximise ACEI/ARB /Entresto  10. Very sedentary life style   11. Language barrier   12. Not orthostatic. Spent more than 20min trying to get daughters number . Talked to  . Number given was . No answer. RECOMMENDATIONS:  1. Optimise CHF meds   2. Start Hydrallazine/Nitrates  3. Increase lasix  4. May need Right and left heart cath/PCI,but I am still waiting to discuss with daughter, and pt unable to help  5. Please hold DC today   6. High readmission risk  7. Was PE ruled out ?      ================================================================      Thank you for your consideration for this consultation.     SIGNATURE: Electronically signed by [place] MD on 4/23/2020 at 10:49 AM

## 2020-04-23 NOTE — PROGRESS NOTES
artery disease) ([facility]     Prolonged emergence from general anesthesia     PVD (peripheral vascular disease) ([facility]     Thyroid disease      Past Surgical History:   Procedure Laterality Date    CARDIAC SURGERY       SECTION      COLONOSCOPY N/A 2019    COLONOSCOPY DIAGNOSTIC performed by Connor Aguirre MD at [address] GRAFT  2019    unknown vessels    HYSTERECTOMY      TOE AMPUTATION Right     3rd toe       Chart Reviewed: Yes  Patient assessed for rehabilitation services?: Yes  General Comment  Comments: Pt primary Citizen of [place] speaking but knows some [place]. Restrictions:        SUBJECTIVE: Subjective: \"Pee\"    Pain       Post Treatment Pain Screening:   Pain Screening  Patient Currently in Pain: No  Pain Assessment  Pain Assessment: 0-10  Pain Level: 0    Prior Level of Function:  Social/Functional History  Lives With: Spouse  Type of Home: Apartment  Home Layout: One level  Home Access: Elevator(5th floor)  Bathroom Shower/Tub: Tub/Shower unit  Home Equipment: Rolling walker  Receives Help From: Home health  ADL Assistance: Independent  Homemaking Assistance: Independent  Homemaking Responsibilities: Yes  Ambulation Assistance: Independent(using ww)  Transfer Assistance: Independent    OBJECTIVE:   Vision: Impaired  Vision Exceptions: Wears glasses at all times  Hearing: Within functional limits    Cognition:  Overall Orientation Status: Within Functional Limits  Follows Commands: Within Functional Limits    Observation/Palpation  Posture: Good    ROM:  RLE PROM: WFL  LLE PROM: WFL    Strength:  Strength RLE  Comment: grossly 4/5  Strength LLE  Comment: grossly 4/5  Strength Other  Other: Decreased core strength based off functional mobility.     Neuro:  Balance  Sitting - Static: Good  Sitting - Dynamic: Good  Standing - Static: Good;-  Standing - Dynamic: Fair;+        Sensation  Overall Sensation Status: Impaired  Light Touch: Partial deficits in

## 2020-04-23 NOTE — CARE COORDINATION
Dr asked cm to help her locate the dtr. Of the patient. We have nothing on file for the dtr. Call placed to . He told me that his dtrs number is [phone] or [phone]. Spoke with dtr that does not speak [place]. Per the dtr no one except the patients  Scott Mclean speaks [place]. The  Was instructed to take ipad into room and discuss with patient via translation device, since over the phone with  translation was not able to be done.

## 2020-04-23 NOTE — PROGRESS NOTES
Physician Progress Note    4/23/2020   12:39 PM    Name:  Dago Smith  MRN:    [id number]      Day: 1     Admit Date: 4/21/2020 10:42 AM  PCP: Delmar Souza    Code Status:  Full Code    Subjective:     Patient denies complaints at this time. No chest pain, dyspnea, abdominal pain. She does still complain of her chronic lower extremity neuropathic pain.     Current Facility-Administered Medications   Medication Dose Route Frequency Provider Last Rate Last Dose    traMADol (ULTRAM) tablet 50 mg  50 mg Oral Q6H PRN Idelle Kapadia, DO        amLODIPine (NORVASC) tablet 5 mg  5 mg Oral Daily Idelle Kapadia, DO   5 mg at 04/23/20 7779    furosemide (LASIX) tablet 40 mg  40 mg Oral Daily Idelle Kapadia, DO   40 mg at 04/22/20 1508    metoprolol succinate (TOPROL XL) extended release tablet 50 mg  50 mg Oral Daily Idelle Kapadia, DO        sodium chloride flush 0.9 % injection 10 mL  10 mL Intravenous 2 times per day Elfrieda Pleasants, APRN - CNP   10 mL at 04/22/20 2010    sodium chloride flush 0.9 % injection 10 mL  10 mL Intravenous PRN Elfrieda Momo, APRN - CNP   10 mL at 04/21/20 1739    acetaminophen (TYLENOL) tablet 650 mg  650 mg Oral Q6H PRN Elfrieda Pleasants, APRN - CNP   650 mg at 04/22/20 1507    Or    acetaminophen (TYLENOL) suppository 650 mg  650 mg Rectal Q6H PRN Elfrieda Pleasants, APRN - CNP        polyethylene glycol (GLYCOLAX) packet 17 g  17 g Oral Daily PRN Elfrieda Momo, APRN - CNP        ondansetron (ZOFRAN) injection 4 mg  4 mg Intravenous Q6H PRN Elfrieda Momo, APRN - CNP        heparin (porcine) injection 5,000 Units  5,000 Units Subcutaneous 3 times per day Elfrieda Momo, APRN - CNP   5,000 Units at 04/23/20 0425    glucose (GLUTOSE) 40 % oral gel 15 g  15 g Oral PRN Elfrieda Momo, APRN - CNP        dextrose 50 % IV solution  12.5 g Intravenous PRN Elfrieda Pleasants, APRN - CNP        glucagon (rDNA) injection 1 mg  1 mg Intramuscular PRN Donal Barr, APRN - CNP       Aisha Hill dextrose 5 % solution  100 mL/hr Intravenous PRN Raffy Berhane, APRN - CNP        insulin lispro (HUMALOG) injection vial 0-12 Units  0-12 Units Subcutaneous TID WC Raffy Fabiánluna, APRN - CNP   2 Units at 04/22/20 1149    insulin lispro (HUMALOG) injection vial 0-6 Units  0-6 Units Subcutaneous Nightly Raffy Fabiánluna, ESDRAS - CNP   4 Units at 04/21/20 2105    clopidogrel (PLAVIX) tablet 75 mg  75 mg Oral Daily Raffy Berhane, APRN - CNP   75 mg at 04/23/20 2332    ALPRAZolam (XANAX) tablet 2 mg  2 mg Oral Nightly PRN Raffy Boyluna, APRN - CNP   2 mg at 04/22/20 0241    amitriptyline (ELAVIL) tablet 25 mg  25 mg Oral Nightly Raffy Boyluna, APRN - CNP   25 mg at 04/22/20 2005    aspirin chewable tablet 81 mg  81 mg Oral Daily Raffy BoyJAQUELIN carrascoN - CNP   81 mg at 04/23/20 0828    atorvastatin (LIPITOR) tablet 40 mg  40 mg Oral Daily Raffy Boyluna, APRN - CNP   40 mg at 04/22/20 2005    vitamin B-12 (CYANOCOBALAMIN) tablet 1,000 mcg  1,000 mcg Oral Daily [facility] Hospital[facility] - CNP   1,000 mcg at 91/26/22 7447    folic acid (FOLVITE) tablet 1 mg  1 mg Oral Daily Raffy Boyluna, APRN - CNP   1 mg at 04/23/20 4927    hydrOXYzine (VISTARIL) capsule 50 mg  50 mg Oral TID PRN Raffy JAQUELIN LastN - CNP        gabapentin (NEURONTIN) capsule 400 mg  400 mg Oral BID Raffy BoyJAQUELIN carrascoN - CNP   400 mg at 04/23/20 8783    gabapentin (NEURONTIN) capsule 800 mg  800 mg Oral Nightly Raffy Boyluna, APRN - CNP   800 mg at 04/22/20 2005    insulin glargine (LANTUS) injection vial 35 Units  35 Units Subcutaneous Nightly Raffy Berhane, APRN - CNP   35 Units at 04/22/20 2133    insulin lispro (HUMALOG) injection vial 10 Units  10 Units Subcutaneous TID AC Raffy ESDRAS Last - CNP   10 Units at 04/23/20 7037    levothyroxine (SYNTHROID) tablet 50 mcg  50 mcg Oral Daily ESDRAS Rubalcava CNP   50 mcg at 04/23/20 6965    sertraline (ZOLOFT) tablet 200 mg  200 mg Oral Daily ESDRAS Rubalcava CNP   200 mg at 04/22/20 0919    mupirocin (BACTROBAN) 2 % ointment   Topical TID Aylin Meliapark, APRN - CNP        collagenase ointment   Topical Daily Aylin MontgomeryESDRAS CNP        albuterol sulfate  (90 Base) MCG/ACT inhaler 2 puff  2 puff Inhalation Q4H PRN Aylin MeliaparkESDRAS CNP           Physical Examination:      Vitals:  BP (!) 145/80   Pulse 68   Temp 97.4 °F (36.3 °C) (Oral)   Resp 18   Ht 5' 4\" (1.626 m)   Wt 235 lb (106.6 kg)   SpO2 95%   BMI 40.34 kg/m²   Temp (24hrs), Av.6 °F (36.4 °C), Min:97.4 °F (36.3 °C), Max:97.7 °F (36.5 °C)      General appearance: Fatigued but cooperative and no distress. Obese  Mental Status: oriented to person, place and time and normal affect  Lungs: clear to auscultation bilaterally, normal effort  Heart: regular rate and rhythm, no murmur  Abdomen: soft, nontender, nondistended, bowel sounds present, no masses  Extremities: Bilateral foot wounds and toe amputations. Skin: no gross lesions, rashes    Data:     Labs:  Recent Labs     20  1100 20  1123 20  0605   WBC 7.7  --  7.2   HGB 11.1* 12.2 10.6*     --  160     Recent Labs     20  0605 20  0638    140   K 5.1* 4.5    100   CO2 26 26   BUN 40* 38*   CREATININE 1.36* 1.30*   GLUCOSE 253* 118*     Recent Labs     20  1100 20  0605   AST 32 25   ALT 30 28   BILITOT 0.3 0.3   ALKPHOS 113 104     Carotid US:  50-69% STENOSIS OF THE LEFT INTERNAL CAROTID ARTERY. LESS THAN 50% STENOSIS OF THE RIGHT INTERNAL CAROTID ARTERY IDENTIFIED ON TODAY'S EXAMINATION.       ANTEGRADE FLOW OF THE BILATERAL VERTEBRAL ARTERIES. Echo:   Left ventricular size is mildly increased . Mild concentric left ventricular hypertrophy. Left ventricular ejection fraction is visually estimated at 40-45%. Pseudonormal filling pattern noted. Moderate hypokinesis of the lateral segment with mild to moderate   impairment of LV systolic function. Severe annular calcification.    Severe (3+) mitral regurgitation is present. Aortic valve leaflets are mildly thickened. The aortic valve area is 1.8 cm2 with a maximum gradient of 6 mmHg and a   mean gradient of 4 mmHg. --trivial to mild aortic stenosis   Trivial aortic regurgitation is noted. There is moderate ( 2 +) tricuspid regurgitation with estimated RVSP of 68   mm Hg.--moderately severe pulmonary hypertension   Moderate pulmonic regurgitation present. Moderately dilated left atrium. Intra atrial septum with to and fro movement noted, but no obvious PFO but   consider bubble study if clinically indicated   Mildly enlarged right ventricle cavity. Right ventricle global systolic function is mildly reduced . essentially four chamber enlargement with significant valvular pathology   as outlined above. Assessment and Plan:        74-year-old female with significant PVD (multiple toe amputations), obesity, type 2 diabetes, CAD (h/o CABG), chronic L foot wound (infected March 2020) presents from home after family for suspected syncope with preceding dizziness. 1. Syncope: unclear etiology. No events on telemetry. Carotid US without significant disease. No obvious PE on CTA chest. Echo did show new findings discussed below which could have contributed. Patient currently functioning at baseline. 2. New Cardiomyopathy with severe mitral regurgitation / moderately-severe pulmonary hypertension  - appreciate cardiology assistance- adding / adjusting medications. Considering RHC/LHC     3. Hyperkalemia in setting of CKD III. CKD III only, RADHA ruled out:   - discontinued ACEI    4. Deconditioning due to medical conditions:  - Patient ordered a wheelchair due to the patient being unable to adequately ambulate long distances.   - Patient ordered a hospital bed due to the need of frequent repositioning of the body in ways not feasible with an ordinary bed due to peripheral vascular disease, and chronic lower extremity neuropathic pain. T2DM with hyperglycemia  Class III Obesity  PVD  Chronic L foot wound  CAD  JESICA    Diet: DIET CARDIAC; Low Sodium (2 GM); Low Potassium  Dietary Nutrition Supplements: Wound Healing Oral Supplement  Ppx: lovenox  Full Code    Dispo: inpatient. D/C plan pending cardiology evaluation. PT/OT following.      Electronically signed by Lexi Fuentes DO on 4/23/2020 at 12:39 PM

## 2020-04-24 ENCOUNTER — APPOINTMENT (OUTPATIENT)
Dept: CARDIAC CATH/INVASIVE PROCEDURES | Age: 65
DRG: 175 | End: 2020-04-24
Payer: COMMERCIAL

## 2020-04-24 LAB
ANION GAP SERPL CALCULATED.3IONS-SCNC: 14 MEQ/L (ref 9–15)
BUN BLDV-MCNC: 23 MG/DL (ref 8–23)
CALCIUM SERPL-MCNC: 9.4 MG/DL (ref 8.5–9.9)
CHLORIDE BLD-SCNC: 101 MEQ/L (ref 95–107)
CO2: 26 MEQ/L (ref 20–31)
CREAT SERPL-MCNC: 1.03 MG/DL (ref 0.5–0.9)
GFR AFRICAN AMERICAN: >60
GFR NON-AFRICAN AMERICAN: 54.2
GLUCOSE BLD-MCNC: 121 MG/DL (ref 70–99)
GLUCOSE BLD-MCNC: 127 MG/DL (ref 60–115)
GLUCOSE BLD-MCNC: 145 MG/DL (ref 60–115)
GLUCOSE BLD-MCNC: 153 MG/DL (ref 60–115)
GLUCOSE BLD-MCNC: 243 MG/DL (ref 60–115)
PERFORMED ON: ABNORMAL
POC ACTIVATED CLOTTING TIME KAOLIN: 263 SEC (ref 82–152)
POC ACTIVATED CLOTTING TIME KAOLIN: 296 SEC (ref 82–152)
POC SAMPLE TYPE: ABNORMAL
POC SAMPLE TYPE: ABNORMAL
POTASSIUM REFLEX MAGNESIUM: 4.3 MEQ/L (ref 3.4–4.9)
POTASSIUM SERPL-SCNC: 4.3 MEQ/L (ref 3.4–4.9)
SODIUM BLD-SCNC: 141 MEQ/L (ref 135–144)

## 2020-04-24 PROCEDURE — 6370000000 HC RX 637 (ALT 250 FOR IP): Performed by: INTERNAL MEDICINE

## 2020-04-24 PROCEDURE — 99254 IP/OBS CNSLTJ NEW/EST MOD 60: CPT | Performed by: INTERNAL MEDICINE

## 2020-04-24 PROCEDURE — B2111ZZ FLUOROSCOPY OF MULTIPLE CORONARY ARTERIES USING LOW OSMOLAR CONTRAST: ICD-10-PCS | Performed by: INTERNAL MEDICINE

## 2020-04-24 PROCEDURE — B2151ZZ FLUOROSCOPY OF LEFT HEART USING LOW OSMOLAR CONTRAST: ICD-10-PCS | Performed by: INTERNAL MEDICINE

## 2020-04-24 PROCEDURE — C1894 INTRO/SHEATH, NON-LASER: HCPCS

## 2020-04-24 PROCEDURE — 93461 R&L HRT ART/VENTRICLE ANGIO: CPT | Performed by: INTERNAL MEDICINE

## 2020-04-24 PROCEDURE — 80048 BASIC METABOLIC PNL TOTAL CA: CPT

## 2020-04-24 PROCEDURE — 2580000003 HC RX 258: Performed by: INTERNAL MEDICINE

## 2020-04-24 PROCEDURE — C1725 CATH, TRANSLUMIN NON-LASER: HCPCS

## 2020-04-24 PROCEDURE — C1760 CLOSURE DEV, VASC: HCPCS

## 2020-04-24 PROCEDURE — 92920 PRQ TRLUML C ANGIOP 1ART&/BR: CPT | Performed by: INTERNAL MEDICINE

## 2020-04-24 PROCEDURE — 92928 PRQ TCAT PLMT NTRAC ST 1 LES: CPT | Performed by: INTERNAL MEDICINE

## 2020-04-24 PROCEDURE — 4A023N8 MEASUREMENT OF CARDIAC SAMPLING AND PRESSURE, BILATERAL, PERCUTANEOUS APPROACH: ICD-10-PCS | Performed by: INTERNAL MEDICINE

## 2020-04-24 PROCEDURE — 6370000000 HC RX 637 (ALT 250 FOR IP): Performed by: NURSE PRACTITIONER

## 2020-04-24 PROCEDURE — C1887 CATHETER, GUIDING: HCPCS

## 2020-04-24 PROCEDURE — 2060000000 HC ICU INTERMEDIATE R&B

## 2020-04-24 PROCEDURE — 85347 COAGULATION TIME ACTIVATED: CPT

## 2020-04-24 PROCEDURE — C1769 GUIDE WIRE: HCPCS

## 2020-04-24 PROCEDURE — 6370000000 HC RX 637 (ALT 250 FOR IP)

## 2020-04-24 PROCEDURE — B2181ZZ FLUOROSCOPY OF LEFT INTERNAL MAMMARY BYPASS GRAFT USING LOW OSMOLAR CONTRAST: ICD-10-PCS | Performed by: INTERNAL MEDICINE

## 2020-04-24 PROCEDURE — C1751 CATH, INF, PER/CENT/MIDLINE: HCPCS

## 2020-04-24 PROCEDURE — 6360000002 HC RX W HCPCS

## 2020-04-24 PROCEDURE — 2580000003 HC RX 258

## 2020-04-24 PROCEDURE — 36415 COLL VENOUS BLD VENIPUNCTURE: CPT

## 2020-04-24 PROCEDURE — 82948 REAGENT STRIP/BLOOD GLUCOSE: CPT

## 2020-04-24 PROCEDURE — 027034Z DILATION OF CORONARY ARTERY, ONE ARTERY WITH DRUG-ELUTING INTRALUMINAL DEVICE, PERCUTANEOUS APPROACH: ICD-10-PCS | Performed by: INTERNAL MEDICINE

## 2020-04-24 PROCEDURE — 2709999900 HC NON-CHARGEABLE SUPPLY

## 2020-04-24 PROCEDURE — 2500000003 HC RX 250 WO HCPCS

## 2020-04-24 PROCEDURE — 6360000004 HC RX CONTRAST MEDICATION: Performed by: INTERNAL MEDICINE

## 2020-04-24 PROCEDURE — C1874 STENT, COATED/COV W/DEL SYS: HCPCS

## 2020-04-24 RX ORDER — SODIUM CHLORIDE 0.9 % (FLUSH) 0.9 %
10 SYRINGE (ML) INJECTION PRN
Status: DISCONTINUED | OUTPATIENT
Start: 2020-04-24 | End: 2020-04-28 | Stop reason: HOSPADM

## 2020-04-24 RX ORDER — ONDANSETRON 2 MG/ML
4 INJECTION INTRAMUSCULAR; INTRAVENOUS EVERY 6 HOURS PRN
Status: DISCONTINUED | OUTPATIENT
Start: 2020-04-24 | End: 2020-04-28 | Stop reason: HOSPADM

## 2020-04-24 RX ORDER — ASPIRIN 81 MG/1
81 TABLET ORAL DAILY
Status: DISCONTINUED | OUTPATIENT
Start: 2020-04-25 | End: 2020-04-28 | Stop reason: HOSPADM

## 2020-04-24 RX ORDER — 0.9 % SODIUM CHLORIDE 0.9 %
500 INTRAVENOUS SOLUTION INTRAVENOUS ONCE
Status: DISCONTINUED | OUTPATIENT
Start: 2020-04-24 | End: 2020-04-28 | Stop reason: HOSPADM

## 2020-04-24 RX ORDER — HYDRALAZINE HYDROCHLORIDE 50 MG/1
50 TABLET, FILM COATED ORAL EVERY 8 HOURS SCHEDULED
Status: DISCONTINUED | OUTPATIENT
Start: 2020-04-24 | End: 2020-04-28 | Stop reason: HOSPADM

## 2020-04-24 RX ORDER — SODIUM CHLORIDE 0.9 % (FLUSH) 0.9 %
10 SYRINGE (ML) INJECTION EVERY 12 HOURS SCHEDULED
Status: DISCONTINUED | OUTPATIENT
Start: 2020-04-24 | End: 2020-04-27

## 2020-04-24 RX ORDER — ATROPINE SULFATE 0.4 MG/ML
0.6 AMPUL (ML) INJECTION
Status: ACTIVE | OUTPATIENT
Start: 2020-04-24 | End: 2020-04-24

## 2020-04-24 RX ORDER — HYDRALAZINE HYDROCHLORIDE 20 MG/ML
10 INJECTION INTRAMUSCULAR; INTRAVENOUS EVERY 10 MIN PRN
Status: DISCONTINUED | OUTPATIENT
Start: 2020-04-24 | End: 2020-04-28 | Stop reason: HOSPADM

## 2020-04-24 RX ORDER — ACETAMINOPHEN 325 MG/1
650 TABLET ORAL EVERY 4 HOURS PRN
Status: DISCONTINUED | OUTPATIENT
Start: 2020-04-24 | End: 2020-04-28 | Stop reason: HOSPADM

## 2020-04-24 RX ORDER — NITROGLYCERIN 20 MG/100ML
20 INJECTION INTRAVENOUS CONTINUOUS
Status: DISCONTINUED | OUTPATIENT
Start: 2020-04-24 | End: 2020-04-25

## 2020-04-24 RX ORDER — DOCUSATE SODIUM 100 MG/1
100 CAPSULE, LIQUID FILLED ORAL 2 TIMES DAILY
Status: DISCONTINUED | OUTPATIENT
Start: 2020-04-24 | End: 2020-04-28 | Stop reason: HOSPADM

## 2020-04-24 RX ADMIN — HYDRALAZINE HYDROCHLORIDE 25 MG: 25 TABLET, FILM COATED ORAL at 05:53

## 2020-04-24 RX ADMIN — Medication 10 ML: at 09:36

## 2020-04-24 RX ADMIN — CYANOCOBALAMIN TAB 500 MCG 1000 MCG: 500 TAB at 09:18

## 2020-04-24 RX ADMIN — INSULIN LISPRO 4 UNITS: 100 INJECTION, SOLUTION INTRAVENOUS; SUBCUTANEOUS at 12:29

## 2020-04-24 RX ADMIN — MUPIROCIN: 20 OINTMENT TOPICAL at 09:00

## 2020-04-24 RX ADMIN — CLOPIDOGREL BISULFATE 75 MG: 75 TABLET ORAL at 09:18

## 2020-04-24 RX ADMIN — AMLODIPINE BESYLATE 5 MG: 5 TABLET ORAL at 09:18

## 2020-04-24 RX ADMIN — ISOSORBIDE DINITRATE 20 MG: 20 TABLET ORAL at 12:27

## 2020-04-24 RX ADMIN — FOLIC ACID 1 MG: 1 TABLET ORAL at 09:19

## 2020-04-24 RX ADMIN — IOPAMIDOL 190 ML: 612 INJECTION, SOLUTION INTRATHECAL at 15:21

## 2020-04-24 RX ADMIN — LEVOTHYROXINE SODIUM 50 MCG: 0.05 TABLET ORAL at 05:52

## 2020-04-24 RX ADMIN — METOPROLOL SUCCINATE 50 MG: 50 TABLET, EXTENDED RELEASE ORAL at 09:18

## 2020-04-24 RX ADMIN — GABAPENTIN 400 MG: 400 CAPSULE ORAL at 09:18

## 2020-04-24 RX ADMIN — SODIUM CHLORIDE: 9 INJECTION, SOLUTION INTRAVENOUS at 05:52

## 2020-04-24 RX ADMIN — TICAGRELOR 180 MG: 90 TABLET ORAL at 16:20

## 2020-04-24 RX ADMIN — COLLAGENASE SANTYL: 250 OINTMENT TOPICAL at 09:37

## 2020-04-24 RX ADMIN — ASPIRIN 81 MG 81 MG: 81 TABLET ORAL at 09:19

## 2020-04-24 NOTE — PROGRESS NOTES
appreciate prominent V waves on the pulmonary capillary wedge pressure tracing  Significant difference between PA diastolic and mean pulmonary capillary wedge pressure  Moderate to severe pulmonary hypertension  No evidence of oxygen step up  Elevated filling pressures right greater than left. Critical disease ostial circumflex and ostial LAD  Totally occluded RCA fills by grade 2 collaterals from the left system and grade 1 antegrade collaterals. Underwent PCI and drug-eluting stent to the ostial circumflex artery with a 3.5 x 12 mm Xience Concha stent, which was postdilated with a 4.5 mm noncompliant balloon.       Assessment:  1. Dizziness/fell/issue of syncope is unclear. Patient was groggy,room dark and she had taked Vistaril. Has ?sleep apnoea  2. Negative troponin,no9 acute EKG abnormality,doubt ACS. 3. CHF,deterioration of LVef from 6/19 with worse MR and pulmonary hypertension . Possible that ostial circ stent is occluded with ischemic MR and pulmonary hypertension that is multifactorial  4. No angina. 5. Elevated BNP  6. Abnormal Chest CT   7. CKD   8. Extensive vascular disease  9. Tendency for hyperkalemia ,hence will be difficult to maximise ACEI/ARB /Entresto  10. Very sedentary life style   11. Language barrier   12. Not orthostatic. 13.  Cardiac catheterization and PCI findings are as noted above. 14.  Hemodynamics suggest biventricular heart failure, and pulmonary hypertension that is of multi factorial etiology. 15.  Pulmonary embolism has been excluded. Pt has acute on chronic biventricular systolic heart failure ,related to ischemic cardiomyopathy and mitral regurgitation,and untreated obstructive sleep apnea and hypoventilation. She also has hypertensive heart and kidney disease and Diabetic CKD. Plan:  Preload and afterload reduction with nitrates and hydralazine. I discontinued amlodipine and allow in order to allow room for CHF medical therapy.   Has a tendency for hyperkalemia, but my hope is that she will tolerate Entresto with Lasix on board. Her I's and O's and weights are probably inaccurate. Watch renal function closely, it did improve with IV Lasix infusion, but may get worse after contrast exposure today. Her filling pressures suggest that she is still volume overloaded. Low-dose dobutamine is a consideration if renal function deteriorates with Lasix. Will need close outpatient follow-up upon discharge, high risk of readmission. Will need prescription for Brilinta, I made the switch from Plavix to Brilinta. Please provide Brilinta card. Agree completely with home health care if she qualifies. Appreciate pulmonary input, and recommendations for oxygen upon discharge. Thankyou !   Electronically signed by Clarisa Thompson MD on 4/24/2020 at 5:05 PM

## 2020-04-24 NOTE — CONSULTS
small foci of air are identified within the right atrial appendage as well as within the veins of the right neck and left axillary region, presumably iatrogenic. Nonspecific scattered groundglass opacities throughout both lungs most significantly involving the bilateral lower lobes at the lung base. No pulmonary nodule or mass. No consolidation, pleural effusion, or pneumothorax. Airways are patent. No bronchiectasis. Degenerative changes of the spine. No acute osseous abnormality. No acute abnormality identified of the upper abdomen. Respiratory motion artifact precludes evaluation for subsegmental pulmonary emboli of the right and left lower lobes at the bases. No large central pulmonary embolism identified. Cardiomegaly. Reflux of contrast into the hepatic veins suggests right heart failure. Diffuse nonspecific bilateral groundglass opacities most significant within the right and left lower lobes at the lung bases may be infectious/inflammatory in etiology or related to pulmonary edema. All CT scans at this facility use dose modulation, iterative reconstruction, and/or weight based dosing when appropriate to reduce radiation dose to as low as reasonably achievable. Ct Cervical Spine Wo Contrast    Result Date: 4/21/2020  EXAM: CT CERVICAL SPINE WO CONTRAST COMPARISON: None available REASON FOR EXAMINATION:  Neck pain after a fall. Trauma. TECHNIQUE: Multiple contiguous axial CT images of the cervical spine were obtained. Multiplanar reformats performed. FINDINGS:  No acute fracture or malalignment. Atlanto-occipital articulation is maintained. Atlantodental interval is preserved. Cervical spinal vertebral body heights are preserved. Mild multilevel degenerative changes of the cervical spine. Straightening of the  cervical lordosis. There is no prevertebral soft tissue swelling. Please see CT chest report regarding lung apex findings. No acute fracture or malalignment.  All CT scans at this facility

## 2020-04-24 NOTE — DISCHARGE INSTR - COC
Continuity of Care Form    Patient Name: Vinnie Medrano   :  1955  MRN:  [id number]    Admit date:  2020  Discharge date:  20      Code Status Order: Full Code   Advance Directives:   [facility] Documentation     Date/Time Healthcare Directive Type of Healthcare Directive Copy in [facility] [address] Agent's Name Healthcare Agent's Phone Number    20 1725  No, patient does not have an advance directive for healthcare treatment -- -- -- -- --          Admitting Physician:  Nilesh Benson DO  PCP: Julian Fuchs    Discharging Nurse: [address] [place] [address] Unit/Room#: P042/I612-26  Discharging Unit Phone Number: [phone]      Emergency Contact:   Extended Emergency Contact Information  Primary Emergency Contact: [address] [place] [address] Phone: [phone]  Relation: Spouse    Past Surgical History:  Past Surgical History:   Procedure Laterality Date    CARDIAC SURGERY       SECTION      COLONOSCOPY N/A 2019    COLONOSCOPY DIAGNOSTIC performed by Tuan Peck MD at [facility]  2019    unknown vessels    HYSTERECTOMY      TOE AMPUTATION Right     3rd toe       Immunization History: There is no immunization history on file for this patient.     Active Problems:  Patient Active Problem List   Diagnosis Code    Severe major depression with psychotic features ([address] F32.3    Type 2 diabetes mellitus with hyperglycemia, with long-term current use of insulin (HCC) E11.65, Z79.4    HTN (hypertension) I10    HLD (hyperlipidemia) E78.5    Hypothyroid E03.9    HF (heart failure) ([address] I50.9    Non-pressure ulcer of toe ([address] L97.509    Atherosclerotic PVD with intermittent claudication ([address] I70.219    Acute osteomyelitis ([address] M86.10    Skin infection L08.9    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 A49.8    Surgical wound dehiscence, initial encounter T81.31XA    S/P amputation of lesser toe, right ([facility]) Z89.421    Rectal bleeding K62.5    Diabetic ulcer of right foot with bone involvement without evidence of necrosis ([facility]) E11.621, L97.516    Athscl native arteries of left leg w ulceration oth prt foot ([facility]) I70.245    JESICA (obstructive sleep apnea) G47.33    Neuropathy due to secondary diabetes ([facility]) E13.40    Chest pain R07.9    PAD (peripheral artery disease) ([facility]) I73.9    Cellulitis L03.90    Syncope and collapse R55    Severe mitral regurgitation I34.0    Ischemic cardiomyopathy I25.5    Pulmonary hypertension ([facility]) I27.20       Isolation/Infection:   Isolation          Contact        Patient Infection Status     Infection Onset Added Last Indicated Last Indicated By Review Planned Expiration Resolved Resolved By    MRSA 08/09/19 08/11/19 03/05/20 Culture, Tissue        MRSA Left foot tissue on 3/5/2020. Electronically signed by Ismael Akins RN on 3/9/2020 at 2:14 PM     MRSA left 3rd toe on 8/9/2019.  Electronically signed by Ismael Akins RN on 8/11/2019 at 9:15 PM           Nurse Assessment:  Last Vital Signs: BP (!) 145/54   Pulse 70   Temp 97.5 °F (36.4 °C) (Oral)   Resp 20   Ht 5' 4\" (1.626 m)   Wt 238 lb 12.8 oz (108.3 kg)   SpO2 100%   BMI 40.99 kg/m²     Last documented pain score (0-10 scale): Pain Level: 0  Last Weight:   Wt Readings from Last 1 Encounters:   04/24/20 238 lb 12.8 oz (108.3 kg)     Mental Status:  oriented, alert, coherent, logical, thought processes intact and able to concentrate and follow conversation    IV Access:  - None    Nursing Mobility/ADLs:  Walking   Assisted  Transfer  Assisted  Bathing  Assisted  Dressing  Assisted  Toileting  Assisted  Feeding  Independent  Med Admin  Assisted  Med Delivery   whole    Wound Care Documentation and Therapy:  Wound 04/23/19 Toe (Comment  which one) Right #1. 3rd toe (Active)   Wound Non-Healing Surgical 4/23/2020  7:45 AM   Offloading for Diabetic {[facility][note 3]:[id number]}

## 2020-04-24 NOTE — OP NOTE
60%.  There was SAMARA-3 flow before and after the procedure. 10. Hemodynamics: Central aortic pressure 126/59 mean right atrial pressure 15 to 16 mmHg right ventricular pressure 50/17 PA pressure 53/31 with a mean of 41 mean pulmonary capillary wedge pressure 20 mmHg LV end-diastolic pressure 15 to 20 mmHg. Did not appreciate prominent V waves on the pulmonary capillary wedge pressure tracing. Pulmonary capillary wedge saturation was 88%, there was no evidence of oxygen step up. Right femoral artery saturation was 90.2%      Summary: Moderate LV systolic dysfunction  Good correlation between LV end-diastolic pressure and mean pulmonary capillary wedge pressure, did not appreciate prominent V waves on the pulmonary capillary wedge pressure tracing  Significant difference between PA diastolic and mean pulmonary capillary wedge pressure  Moderate to severe pulmonary hypertension  No evidence of oxygen step up  Elevated filling pressures right greater than left. Critical disease ostial circumflex and ostial LAD  Totally occluded RCA fills by grade 2 collaterals from the left system and grade 1 antegrade collaterals. Underwent PCI and drug-eluting stent to the ostial circumflex artery with a 3.5 x 12 mm Xience Concha stent, which was postdilated with a 4.5 mm noncompliant balloon. RECOMMENDATIONS:   Continue the Lasix infusion at 1 mg/h, with close follow-up of renal function. With today's contrast load, it is conceivable that renal function might decline, in which case the Lasix should be discontinued or held, and low-dose dobutamine infusion can be tried. I have increased hydralazine dosage, may increase isosorbide as well based on blood pressure. I have switched from Plavix to Brilinta, please watch for side effects, and switch back to Plavix as needed, would give Plavix dose of 600 mg on day 1 if switch is made from Brilinta to Plavix.     In the long-term, once patient is off dual antiplatelet

## 2020-04-24 NOTE — PROGRESS NOTES
Recent Labs     04/23/20  0638 04/24/20  0639    141   K 4.5 4.3  4.3    101   CO2 26 26   BUN 38* 23   CREATININE 1.30* 1.03*   GLUCOSE 118* 121*     Recent Labs     04/22/20  0605   AST 25   ALT 28   BILITOT 0.3   ALKPHOS 104     Carotid US:  50-69% STENOSIS OF THE LEFT INTERNAL CAROTID ARTERY. LESS THAN 50% STENOSIS OF THE RIGHT INTERNAL CAROTID ARTERY IDENTIFIED ON TODAY'S EXAMINATION.       ANTEGRADE FLOW OF THE BILATERAL VERTEBRAL ARTERIES. Echo:   Left ventricular size is mildly increased . Mild concentric left ventricular hypertrophy. Left ventricular ejection fraction is visually estimated at 40-45%. Pseudonormal filling pattern noted. Moderate hypokinesis of the lateral segment with mild to moderate   impairment of LV systolic function. Severe annular calcification. Severe (3+) mitral regurgitation is present. Aortic valve leaflets are mildly thickened. The aortic valve area is 1.8 cm2 with a maximum gradient of 6 mmHg and a   mean gradient of 4 mmHg. --trivial to mild aortic stenosis   Trivial aortic regurgitation is noted. There is moderate ( 2 +) tricuspid regurgitation with estimated RVSP of 68   mm Hg.--moderately severe pulmonary hypertension   Moderate pulmonic regurgitation present. Moderately dilated left atrium. Intra atrial septum with to and fro movement noted, but no obvious PFO but   consider bubble study if clinically indicated   Mildly enlarged right ventricle cavity. Right ventricle global systolic function is mildly reduced . essentially four chamber enlargement with significant valvular pathology   as outlined above. Assessment and Plan:        27-year-old female with significant PVD (multiple toe amputations), obesity, type 2 diabetes, CAD (h/o CABG), chronic L foot wound (infected March 2020) presents from home after family for suspected syncope with preceding dizziness. 1. Syncope: unclear etiology.  No events on

## 2020-04-24 NOTE — CARE COORDINATION
Plan remains return to home and resume [facility]. Request on chart for resume order. DME from [address] [place] [address] scheduled for delivery.

## 2020-04-25 LAB
ANION GAP SERPL CALCULATED.3IONS-SCNC: 12 MEQ/L (ref 9–15)
BUN BLDV-MCNC: 17 MG/DL (ref 8–23)
CALCIUM SERPL-MCNC: 9.4 MG/DL (ref 8.5–9.9)
CHLORIDE BLD-SCNC: 94 MEQ/L (ref 95–107)
CO2: 31 MEQ/L (ref 20–31)
CREAT SERPL-MCNC: 0.98 MG/DL (ref 0.5–0.9)
GFR AFRICAN AMERICAN: >60
GFR NON-AFRICAN AMERICAN: 57.4
GLUCOSE BLD-MCNC: 138 MG/DL (ref 60–115)
GLUCOSE BLD-MCNC: 141 MG/DL (ref 60–115)
GLUCOSE BLD-MCNC: 144 MG/DL (ref 70–99)
GLUCOSE BLD-MCNC: 250 MG/DL (ref 60–115)
GLUCOSE BLD-MCNC: 252 MG/DL (ref 60–115)
HCT VFR BLD CALC: 34.8 % (ref 37–47)
HEMOGLOBIN: 11.6 G/DL (ref 12–16)
MCH RBC QN AUTO: 25 PG (ref 27–31.3)
MCHC RBC AUTO-ENTMCNC: 33.3 % (ref 33–37)
MCV RBC AUTO: 75.1 FL (ref 82–100)
PDW BLD-RTO: 17.6 % (ref 11.5–14.5)
PERFORMED ON: ABNORMAL
PLATELET # BLD: 192 K/UL (ref 130–400)
POTASSIUM REFLEX MAGNESIUM: 4 MEQ/L (ref 3.4–4.9)
POTASSIUM SERPL-SCNC: 4 MEQ/L (ref 3.4–4.9)
RBC # BLD: 4.64 M/UL (ref 4.2–5.4)
SODIUM BLD-SCNC: 137 MEQ/L (ref 135–144)
WBC # BLD: 8.5 K/UL (ref 4.8–10.8)

## 2020-04-25 PROCEDURE — 6360000002 HC RX W HCPCS: Performed by: INTERNAL MEDICINE

## 2020-04-25 PROCEDURE — 6370000000 HC RX 637 (ALT 250 FOR IP): Performed by: INTERNAL MEDICINE

## 2020-04-25 PROCEDURE — 85027 COMPLETE CBC AUTOMATED: CPT

## 2020-04-25 PROCEDURE — 2580000003 HC RX 258: Performed by: INTERNAL MEDICINE

## 2020-04-25 PROCEDURE — 2700000000 HC OXYGEN THERAPY PER DAY

## 2020-04-25 PROCEDURE — 2060000000 HC ICU INTERMEDIATE R&B

## 2020-04-25 PROCEDURE — 36415 COLL VENOUS BLD VENIPUNCTURE: CPT

## 2020-04-25 PROCEDURE — 80048 BASIC METABOLIC PNL TOTAL CA: CPT

## 2020-04-25 PROCEDURE — 94762 N-INVAS EAR/PLS OXIMTRY CONT: CPT

## 2020-04-25 RX ORDER — TORSEMIDE 20 MG/1
20 TABLET ORAL DAILY
Status: DISCONTINUED | OUTPATIENT
Start: 2020-04-25 | End: 2020-04-27

## 2020-04-25 RX ADMIN — Medication 10 ML: at 09:46

## 2020-04-25 RX ADMIN — ISOSORBIDE DINITRATE 20 MG: 20 TABLET ORAL at 13:58

## 2020-04-25 RX ADMIN — INSULIN LISPRO 6 UNITS: 100 INJECTION, SOLUTION INTRAVENOUS; SUBCUTANEOUS at 11:53

## 2020-04-25 RX ADMIN — HYDRALAZINE HYDROCHLORIDE 50 MG: 50 TABLET, FILM COATED ORAL at 13:59

## 2020-04-25 RX ADMIN — Medication 10 ML: at 01:46

## 2020-04-25 RX ADMIN — AMITRIPTYLINE HYDROCHLORIDE 25 MG: 25 TABLET, FILM COATED ORAL at 21:05

## 2020-04-25 RX ADMIN — AMITRIPTYLINE HYDROCHLORIDE 25 MG: 25 TABLET, FILM COATED ORAL at 01:39

## 2020-04-25 RX ADMIN — GABAPENTIN 400 MG: 400 CAPSULE ORAL at 01:38

## 2020-04-25 RX ADMIN — HYDRALAZINE HYDROCHLORIDE 50 MG: 50 TABLET, FILM COATED ORAL at 01:36

## 2020-04-25 RX ADMIN — Medication 10 ML: at 01:45

## 2020-04-25 RX ADMIN — ISOSORBIDE DINITRATE 20 MG: 20 TABLET ORAL at 01:49

## 2020-04-25 RX ADMIN — ISOSORBIDE DINITRATE 20 MG: 20 TABLET ORAL at 09:37

## 2020-04-25 RX ADMIN — DOCUSATE SODIUM 100 MG: 100 CAPSULE, LIQUID FILLED ORAL at 20:54

## 2020-04-25 RX ADMIN — INSULIN LISPRO 1 UNITS: 100 INJECTION, SOLUTION INTRAVENOUS; SUBCUTANEOUS at 01:38

## 2020-04-25 RX ADMIN — METOPROLOL SUCCINATE 50 MG: 50 TABLET, EXTENDED RELEASE ORAL at 09:37

## 2020-04-25 RX ADMIN — FUROSEMIDE 2 MG/HR: 10 INJECTION, SOLUTION INTRAVENOUS at 11:22

## 2020-04-25 RX ADMIN — GABAPENTIN 400 MG: 400 CAPSULE ORAL at 09:37

## 2020-04-25 RX ADMIN — DOCUSATE SODIUM 100 MG: 100 CAPSULE, LIQUID FILLED ORAL at 09:37

## 2020-04-25 RX ADMIN — SACUBITRIL AND VALSARTAN 1 TABLET: 24; 26 TABLET, FILM COATED ORAL at 13:58

## 2020-04-25 RX ADMIN — HEPARIN SODIUM 5000 UNITS: 5000 INJECTION INTRAVENOUS; SUBCUTANEOUS at 01:37

## 2020-04-25 RX ADMIN — DOCUSATE SODIUM 100 MG: 100 CAPSULE, LIQUID FILLED ORAL at 01:35

## 2020-04-25 RX ADMIN — MUPIROCIN: 20 OINTMENT TOPICAL at 01:47

## 2020-04-25 RX ADMIN — TICAGRELOR 90 MG: 90 TABLET ORAL at 09:36

## 2020-04-25 RX ADMIN — GABAPENTIN 800 MG: 400 CAPSULE ORAL at 20:55

## 2020-04-25 RX ADMIN — HEPARIN SODIUM 5000 UNITS: 5000 INJECTION INTRAVENOUS; SUBCUTANEOUS at 20:45

## 2020-04-25 RX ADMIN — ISOSORBIDE DINITRATE 20 MG: 20 TABLET ORAL at 20:58

## 2020-04-25 RX ADMIN — ATORVASTATIN CALCIUM 40 MG: 40 TABLET, FILM COATED ORAL at 01:35

## 2020-04-25 RX ADMIN — HYDRALAZINE HYDROCHLORIDE 50 MG: 50 TABLET, FILM COATED ORAL at 20:55

## 2020-04-25 RX ADMIN — Medication 10 ML: at 21:00

## 2020-04-25 RX ADMIN — TICAGRELOR 90 MG: 90 TABLET ORAL at 20:55

## 2020-04-25 RX ADMIN — ASPIRIN 81 MG: 81 TABLET, COATED ORAL at 09:37

## 2020-04-25 RX ADMIN — HEPARIN SODIUM 5000 UNITS: 5000 INJECTION INTRAVENOUS; SUBCUTANEOUS at 11:22

## 2020-04-25 RX ADMIN — HYDRALAZINE HYDROCHLORIDE 50 MG: 50 TABLET, FILM COATED ORAL at 06:30

## 2020-04-25 RX ADMIN — TORSEMIDE 20 MG: 20 TABLET ORAL at 13:58

## 2020-04-25 RX ADMIN — CYANOCOBALAMIN TAB 500 MCG 1000 MCG: 500 TAB at 09:37

## 2020-04-25 RX ADMIN — INSULIN GLARGINE 35 UNITS: 100 INJECTION, SOLUTION SUBCUTANEOUS at 20:45

## 2020-04-25 RX ADMIN — ALPRAZOLAM 2 MG: 1 TABLET ORAL at 01:36

## 2020-04-25 RX ADMIN — LEVOTHYROXINE SODIUM 50 MCG: 0.05 TABLET ORAL at 06:30

## 2020-04-25 RX ADMIN — INSULIN GLARGINE 35 UNITS: 100 INJECTION, SOLUTION SUBCUTANEOUS at 01:36

## 2020-04-25 RX ADMIN — SACUBITRIL AND VALSARTAN 1 TABLET: 24; 26 TABLET, FILM COATED ORAL at 21:05

## 2020-04-25 RX ADMIN — DIAZEPAM 5 MG: 5 TABLET ORAL at 20:55

## 2020-04-25 RX ADMIN — GABAPENTIN 400 MG: 400 CAPSULE ORAL at 13:58

## 2020-04-25 RX ADMIN — INSULIN LISPRO 6 UNITS: 100 INJECTION, SOLUTION INTRAVENOUS; SUBCUTANEOUS at 16:44

## 2020-04-25 RX ADMIN — Medication 10 ML: at 09:37

## 2020-04-25 RX ADMIN — FOLIC ACID 1 MG: 1 TABLET ORAL at 09:37

## 2020-04-25 RX ADMIN — COLLAGENASE SANTYL: 250 OINTMENT TOPICAL at 11:22

## 2020-04-25 RX ADMIN — SERTRALINE 200 MG: 100 TABLET, FILM COATED ORAL at 09:37

## 2020-04-25 ASSESSMENT — PAIN SCALES - GENERAL
PAINLEVEL_OUTOF10: 0

## 2020-04-25 NOTE — PROGRESS NOTES
acute intracranial process. All CT scans at this facility use dose modulation, iterative reconstruction, and/or weight based dosing when appropriate to reduce radiation dose to as low as reasonably achievable. CT Cervical Spine WO Contrast   Final Result      No acute fracture or malalignment. All CT scans at this facility use dose modulation, iterative reconstruction, and/or weight based dosing when appropriate to reduce radiation dose to as low as reasonably achievable. CTA Chest W WO Contrast   Final Result      Respiratory motion artifact precludes evaluation for subsegmental pulmonary emboli of the right and left lower lobes at the bases. No large central pulmonary embolism identified. Cardiomegaly. Reflux of contrast into the hepatic veins suggests right heart failure. Diffuse nonspecific bilateral groundglass opacities most significant within the right and left lower lobes at the lung bases may be infectious/inflammatory in etiology or related to pulmonary edema. All CT scans at this facility use dose modulation, iterative reconstruction, and/or weight based dosing when appropriate to reduce radiation dose to as low as reasonably achievable. XR CHEST PORTABLE   Final Result   NO ACUTE CHEST DISEASE.            CURRENT MEDICATIONS       hydrALAZINE  50 mg Oral 3 times per day    sodium chloride flush  10 mL Intravenous 2 times per day    sodium chloride  500 mL Intra-arterial Once    docusate sodium  100 mg Oral BID    aspirin  81 mg Oral Daily    ticagrelor  90 mg Oral BID    isosorbide dinitrate  20 mg Oral TID    sodium chloride flush  10 mL Intravenous 2 times per day    diazePAM  5 mg Oral 30 Min Pre-Op    predniSONE  50 mg Oral Once    diphenhydrAMINE  50 mg Oral Once    metoprolol succinate  50 mg Oral Daily    sodium chloride flush  10 mL Intravenous 2 times per day    heparin (porcine)  5,000 Units Subcutaneous 3 times per day    insulin lispro  0-12 Units Subcutaneous TID WC    insulin lispro  0-6 Units Subcutaneous Nightly    amitriptyline  25 mg Oral Nightly    atorvastatin  40 mg Oral Daily    vitamin B-12  1,000 mcg Oral Daily    folic acid  1 mg Oral Daily    gabapentin  400 mg Oral BID    gabapentin  800 mg Oral Nightly    insulin glargine  35 Units Subcutaneous Nightly    insulin lispro  10 Units Subcutaneous TID AC    levothyroxine  50 mcg Oral Daily    sertraline  200 mg Oral Daily    mupirocin   Topical TID    collagenase   Topical Daily       INTRAVENOUS MEDICATIONS       nitroGLYCERIN 20 mcg/min (04/24/20 1931)    furosemide (LASIX) 1mg/ml infusion 2 mg/hr (04/23/20 2351)    dextrose         ASSESSMENT     1. Dizziness/Possible syncope. 2. Negative troponin,no acute EKG abnormality, doubt ACS. 3. CHF with deterioration of LVEF from 6/19 and worsening MR and pulmonary hypertension. 4. Status post PCI/stent of the ostial circumflex. 5. Elevated BNP. 6. CKD. 7. Extensive vascular disease  8. Tendency for hyperkalemia ,hence will be difficult to maximise ACEI/ARB /Entresto  9. Acute on chronic biventricular systolic heart failure. Ischemic CM. 10.  DM. PLAN     Start low dose Entresto. Tendency for hyperkalemia and likely won't tolerate a high dose. Continue hydralazine and nitrates. Renal function is stable. Amiodarone discontinued. Discontinue IV lasix and start torsemide. Discontinue IV NTG. Plavix changed to Brilinta. Likely discharge home tomorrow with Eileen 80. Please do not hesitate to call with questions.   Electronically signed by Marge Houser MD, [facility] on 4/25/2020 at 9:59 AM

## 2020-04-25 NOTE — PROGRESS NOTES
Physician Progress Note    4/25/2020   1:22 PM    Name:  Harley Nelson  MRN:    [id number]      Day: 3     Admit Date: 4/21/2020 10:42 AM  PCP: Samantha Amaya    Code Status:  Full Code    Subjective:     She denies complaints. She continues to urinate a lot. Chronic pain in lower extremities.     Current Facility-Administered Medications   Medication Dose Route Frequency Provider Last Rate Last Dose    sacubitril-valsartan (ENTRESTO) 24-26 MG per tablet 1 tablet  1 tablet Oral BID Marge Houser MD        torsemide [facility]) tablet 20 mg  20 mg Oral Daily Marge Houser MD        hydrALAZINE (APRESOLINE) tablet 50 mg  50 mg Oral 3 times per day Jin Holbrook MD   50 mg at 04/25/20 0630    sodium chloride flush 0.9 % injection 10 mL  10 mL Intravenous 2 times per day Jin Holbrook MD   10 mL at 04/25/20 9699    sodium chloride flush 0.9 % injection 10 mL  10 mL Intravenous PRN Jin Holbrook MD        0.9 % sodium chloride bolus  500 mL Intra-arterial Once Jin Holbrook MD        acetaminophen (TYLENOL) tablet 650 mg  650 mg Oral Q4H PRN Jin Holbrook MD        magnesium hydroxide (MILK OF MAGNESIA) 400 MG/5ML suspension 30 mL  30 mL Oral Daily PRN Jin Holbrook MD        docusate sodium (COLACE) capsule 100 mg  100 mg Oral BID Jin Holbrook MD   100 mg at 04/25/20 0937    ondansetron (ZOFRAN) injection 4 mg  4 mg Intravenous Q6H PRN Jin Holbrook MD        hydrALAZINE (APRESOLINE) injection 10 mg  10 mg Intravenous Q10 Min PRN Jin Holbrook MD        aspirin EC tablet 81 mg  81 mg Oral Daily Jin Holbrook MD   81 mg at 04/25/20 4006    ticagrelor (BRILINTA) tablet 90 mg  90 mg Oral BID Jin Holbrook MD   90 mg at 04/25/20 0936    isosorbide dinitrate (ISORDIL) tablet 20 mg  20 mg Oral TID Jin Holbrook MD   20 mg at 04/25/20 4609    sodium chloride flush 0.9 % injection 10 mL  10 mL Intravenous 2 times per day Jin Holbrook MD   10 mL at 04/25/20 0999    sodium chloride flush tablet 2 mg  2 mg Oral Nightly PRN Alicia Morocho MD   2 mg at 20 0136    amitriptyline (ELAVIL) tablet 25 mg  25 mg Oral Nightly Alicia Morocho MD   25 mg at 20 0139    atorvastatin (LIPITOR) tablet 40 mg  40 mg Oral Daily Alicia Morocho MD   40 mg at 20 0135    vitamin B-12 (CYANOCOBALAMIN) tablet 1,000 mcg  1,000 mcg Oral Daily Alicia Morocho MD   1,000 mcg at  9653    folic acid (FOLVITE) tablet 1 mg  1 mg Oral Daily Alicia Morocho MD   1 mg at 20 2127    hydrOXYzine (VISTARIL) capsule 50 mg  50 mg Oral TID PRN Alicia Morocho MD        gabapentin (NEURONTIN) capsule 400 mg  400 mg Oral BID Alicia Morocho MD   400 mg at 20 5906    gabapentin (NEURONTIN) capsule 800 mg  800 mg Oral Nightly Alicia Morocho MD   800 mg at 20 2100    insulin glargine (LANTUS) injection vial 35 Units  35 Units Subcutaneous Nightly Alicia Morocho MD   35 Units at 20 0136    insulin lispro (HUMALOG) injection vial 10 Units  10 Units Subcutaneous TID [facility] Alicia Morocho MD   10 Units at 20 1153    levothyroxine (SYNTHROID) tablet 50 mcg  50 mcg Oral Daily Alicia Morocho MD   50 mcg at 20 0630    sertraline (ZOLOFT) tablet 200 mg  200 mg Oral Daily Alicia Morocho MD   200 mg at 20 0601    mupirocin (BACTROBAN) 2 % ointment   Topical TID Alicia Morocho MD        collagenase ointment   Topical Daily Alicia Morocho MD        albuterol sulfate  (90 Base) MCG/ACT inhaler 2 puff  2 puff Inhalation Q4H PRN Alicia Morocho MD           Physical Examination:      Vitals:  BP (!) 119/55   Pulse 70   Temp 98.4 °F (36.9 °C) (Oral)   Resp 18   Ht 5' 4\" (1.626 m)   Wt 238 lb 12.8 oz (108.3 kg)   SpO2 100%   BMI 40.99 kg/m²   Temp (24hrs), Av °F (36.7 °C), Min:97.4 °F (36.3 °C), Max:98.4 °F (36.9 °C)      General appearance: Fatigued but cooperative and no distress.   Obese  Mental Status: oriented to person, place and time and normal affect  Lungs: clear to auscultation bilaterally, normal effort  Heart: regular rate and rhythm, no murmur  Abdomen: soft, nontender, nondistended, bowel sounds present, no masses  Extremities: Bilateral foot wounds and toe amputations. Skin: no gross lesions, rashes    Data:     Labs:  Recent Labs     04/25/20  0543   WBC 8.5   HGB 11.6*        Recent Labs     04/24/20  0639 04/25/20  0543    137   K 4.3  4.3 4.0  4.0    94*   CO2 26 31   BUN 23 17   CREATININE 1.03* 0.98*   GLUCOSE 121* 144*     No results for input(s): AST, ALT, ALB, BILITOT, ALKPHOS in the last 72 hours. Carotid US:  50-69% STENOSIS OF THE LEFT INTERNAL CAROTID ARTERY. LESS THAN 50% STENOSIS OF THE RIGHT INTERNAL CAROTID ARTERY IDENTIFIED ON TODAY'S EXAMINATION.       ANTEGRADE FLOW OF THE BILATERAL VERTEBRAL ARTERIES. Echo:   Left ventricular size is mildly increased . Mild concentric left ventricular hypertrophy. Left ventricular ejection fraction is visually estimated at 40-45%. Pseudonormal filling pattern noted. Moderate hypokinesis of the lateral segment with mild to moderate   impairment of LV systolic function. Severe annular calcification. Severe (3+) mitral regurgitation is present. Aortic valve leaflets are mildly thickened. The aortic valve area is 1.8 cm2 with a maximum gradient of 6 mmHg and a   mean gradient of 4 mmHg. --trivial to mild aortic stenosis   Trivial aortic regurgitation is noted. There is moderate ( 2 +) tricuspid regurgitation with estimated RVSP of 68   mm Hg.--moderately severe pulmonary hypertension   Moderate pulmonic regurgitation present. Moderately dilated left atrium. Intra atrial septum with to and fro movement noted, but no obvious PFO but   consider bubble study if clinically indicated   Mildly enlarged right ventricle cavity. Right ventricle global systolic function is mildly reduced .    essentially four chamber enlargement with significant valvular pathology   as outlined

## 2020-04-26 ENCOUNTER — APPOINTMENT (OUTPATIENT)
Dept: ULTRASOUND IMAGING | Age: 65
DRG: 175 | End: 2020-04-26
Payer: COMMERCIAL

## 2020-04-26 PROBLEM — G47.34 NOCTURNAL HYPOXIA: Status: ACTIVE | Noted: 2020-04-26

## 2020-04-26 PROBLEM — N17.9 AKI (ACUTE KIDNEY INJURY) (HCC): Status: ACTIVE | Noted: 2020-04-26

## 2020-04-26 PROBLEM — I50.43 ACUTE ON CHRONIC COMBINED SYSTOLIC AND DIASTOLIC CONGESTIVE HEART FAILURE (HCC): Status: ACTIVE | Noted: 2020-04-26

## 2020-04-26 LAB
ANION GAP SERPL CALCULATED.3IONS-SCNC: 13 MEQ/L (ref 9–15)
ANION GAP SERPL CALCULATED.3IONS-SCNC: 15 MEQ/L (ref 9–15)
BUN BLDV-MCNC: 31 MG/DL (ref 8–23)
BUN BLDV-MCNC: 31 MG/DL (ref 8–23)
CALCIUM SERPL-MCNC: 8.2 MG/DL (ref 8.5–9.9)
CALCIUM SERPL-MCNC: 8.4 MG/DL (ref 8.5–9.9)
CHLORIDE BLD-SCNC: 91 MEQ/L (ref 95–107)
CHLORIDE BLD-SCNC: 94 MEQ/L (ref 95–107)
CO2: 27 MEQ/L (ref 20–31)
CO2: 28 MEQ/L (ref 20–31)
CREAT SERPL-MCNC: 3.16 MG/DL (ref 0.5–0.9)
CREAT SERPL-MCNC: 3.27 MG/DL (ref 0.5–0.9)
GFR AFRICAN AMERICAN: 17.3
GFR AFRICAN AMERICAN: 18
GFR NON-AFRICAN AMERICAN: 14.3
GFR NON-AFRICAN AMERICAN: 14.9
GLUCOSE BLD-MCNC: 127 MG/DL (ref 70–99)
GLUCOSE BLD-MCNC: 145 MG/DL (ref 60–115)
GLUCOSE BLD-MCNC: 201 MG/DL (ref 60–115)
GLUCOSE BLD-MCNC: 217 MG/DL (ref 70–99)
GLUCOSE BLD-MCNC: 299 MG/DL (ref 60–115)
GLUCOSE BLD-MCNC: 302 MG/DL (ref 60–115)
PERFORMED ON: ABNORMAL
POTASSIUM REFLEX MAGNESIUM: 4 MEQ/L (ref 3.4–4.9)
POTASSIUM SERPL-SCNC: 3.9 MEQ/L (ref 3.4–4.9)
POTASSIUM SERPL-SCNC: 4 MEQ/L (ref 3.4–4.9)
SODIUM BLD-SCNC: 133 MEQ/L (ref 135–144)
SODIUM BLD-SCNC: 135 MEQ/L (ref 135–144)

## 2020-04-26 PROCEDURE — 36415 COLL VENOUS BLD VENIPUNCTURE: CPT

## 2020-04-26 PROCEDURE — 6370000000 HC RX 637 (ALT 250 FOR IP): Performed by: INTERNAL MEDICINE

## 2020-04-26 PROCEDURE — 6360000002 HC RX W HCPCS: Performed by: INTERNAL MEDICINE

## 2020-04-26 PROCEDURE — 80048 BASIC METABOLIC PNL TOTAL CA: CPT

## 2020-04-26 PROCEDURE — 76775 US EXAM ABDO BACK WALL LIM: CPT

## 2020-04-26 PROCEDURE — 2060000000 HC ICU INTERMEDIATE R&B

## 2020-04-26 PROCEDURE — 2700000000 HC OXYGEN THERAPY PER DAY

## 2020-04-26 PROCEDURE — 2580000003 HC RX 258: Performed by: INTERNAL MEDICINE

## 2020-04-26 RX ORDER — SODIUM CHLORIDE 9 MG/ML
INJECTION, SOLUTION INTRAVENOUS CONTINUOUS
Status: DISCONTINUED | OUTPATIENT
Start: 2020-04-26 | End: 2020-04-28

## 2020-04-26 RX ORDER — 0.9 % SODIUM CHLORIDE 0.9 %
500 INTRAVENOUS SOLUTION INTRAVENOUS ONCE
Status: COMPLETED | OUTPATIENT
Start: 2020-04-26 | End: 2020-04-26

## 2020-04-26 RX ADMIN — GABAPENTIN 400 MG: 400 CAPSULE ORAL at 14:41

## 2020-04-26 RX ADMIN — INSULIN LISPRO 6 UNITS: 100 INJECTION, SOLUTION INTRAVENOUS; SUBCUTANEOUS at 12:09

## 2020-04-26 RX ADMIN — TRAMADOL HYDROCHLORIDE 50 MG: 50 TABLET, FILM COATED ORAL at 02:19

## 2020-04-26 RX ADMIN — CYANOCOBALAMIN TAB 500 MCG 1000 MCG: 500 TAB at 09:47

## 2020-04-26 RX ADMIN — LEVOTHYROXINE SODIUM 50 MCG: 0.05 TABLET ORAL at 05:18

## 2020-04-26 RX ADMIN — HEPARIN SODIUM 5000 UNITS: 5000 INJECTION INTRAVENOUS; SUBCUTANEOUS at 04:00

## 2020-04-26 RX ADMIN — HYDROXYZINE PAMOATE 50 MG: 25 CAPSULE ORAL at 05:18

## 2020-04-26 RX ADMIN — MUPIROCIN: 20 OINTMENT TOPICAL at 00:46

## 2020-04-26 RX ADMIN — INSULIN LISPRO 8 UNITS: 100 INJECTION, SOLUTION INTRAVENOUS; SUBCUTANEOUS at 16:48

## 2020-04-26 RX ADMIN — COLLAGENASE SANTYL: 250 OINTMENT TOPICAL at 14:44

## 2020-04-26 RX ADMIN — DOCUSATE SODIUM 100 MG: 100 CAPSULE, LIQUID FILLED ORAL at 09:48

## 2020-04-26 RX ADMIN — HEPARIN SODIUM 5000 UNITS: 5000 INJECTION INTRAVENOUS; SUBCUTANEOUS at 12:08

## 2020-04-26 RX ADMIN — GABAPENTIN 400 MG: 400 CAPSULE ORAL at 09:48

## 2020-04-26 RX ADMIN — SERTRALINE 200 MG: 100 TABLET, FILM COATED ORAL at 09:48

## 2020-04-26 RX ADMIN — SODIUM CHLORIDE 500 ML: 9 INJECTION, SOLUTION INTRAVENOUS at 12:08

## 2020-04-26 RX ADMIN — Medication 10 ML: at 09:48

## 2020-04-26 RX ADMIN — ISOSORBIDE DINITRATE 20 MG: 20 TABLET ORAL at 21:05

## 2020-04-26 RX ADMIN — INSULIN LISPRO 2 UNITS: 100 INJECTION, SOLUTION INTRAVENOUS; SUBCUTANEOUS at 20:59

## 2020-04-26 RX ADMIN — FOLIC ACID 1 MG: 1 TABLET ORAL at 09:47

## 2020-04-26 RX ADMIN — ATORVASTATIN CALCIUM 40 MG: 40 TABLET, FILM COATED ORAL at 00:40

## 2020-04-26 RX ADMIN — ASPIRIN 81 MG: 81 TABLET, COATED ORAL at 09:48

## 2020-04-26 RX ADMIN — ACETAMINOPHEN 650 MG: 325 TABLET ORAL at 05:17

## 2020-04-26 RX ADMIN — GABAPENTIN 800 MG: 400 CAPSULE ORAL at 21:04

## 2020-04-26 RX ADMIN — SODIUM CHLORIDE: 9 INJECTION, SOLUTION INTRAVENOUS at 14:41

## 2020-04-26 RX ADMIN — AMITRIPTYLINE HYDROCHLORIDE 25 MG: 25 TABLET, FILM COATED ORAL at 21:04

## 2020-04-26 RX ADMIN — HYDRALAZINE HYDROCHLORIDE 50 MG: 50 TABLET, FILM COATED ORAL at 21:04

## 2020-04-26 RX ADMIN — HYDRALAZINE HYDROCHLORIDE 50 MG: 50 TABLET, FILM COATED ORAL at 05:17

## 2020-04-26 RX ADMIN — INSULIN LISPRO 2 UNITS: 100 INJECTION, SOLUTION INTRAVENOUS; SUBCUTANEOUS at 09:24

## 2020-04-26 RX ADMIN — INSULIN GLARGINE 35 UNITS: 100 INJECTION, SOLUTION SUBCUTANEOUS at 20:59

## 2020-04-26 RX ADMIN — ALPRAZOLAM 2 MG: 1 TABLET ORAL at 00:40

## 2020-04-26 RX ADMIN — DOCUSATE SODIUM 100 MG: 100 CAPSULE, LIQUID FILLED ORAL at 21:04

## 2020-04-26 RX ADMIN — Medication 10 ML: at 21:00

## 2020-04-26 RX ADMIN — HEPARIN SODIUM 5000 UNITS: 5000 INJECTION INTRAVENOUS; SUBCUTANEOUS at 20:59

## 2020-04-26 RX ADMIN — MUPIROCIN: 20 OINTMENT TOPICAL at 21:05

## 2020-04-26 RX ADMIN — TICAGRELOR 90 MG: 90 TABLET ORAL at 21:04

## 2020-04-26 RX ADMIN — TICAGRELOR 90 MG: 90 TABLET ORAL at 09:48

## 2020-04-26 RX ADMIN — ISOSORBIDE DINITRATE 20 MG: 20 TABLET ORAL at 09:47

## 2020-04-26 RX ADMIN — ATORVASTATIN CALCIUM 40 MG: 40 TABLET, FILM COATED ORAL at 21:04

## 2020-04-26 ASSESSMENT — PAIN SCALES - GENERAL
PAINLEVEL_OUTOF10: 5
PAINLEVEL_OUTOF10: 0
PAINLEVEL_OUTOF10: 5
PAINLEVEL_OUTOF10: 5
PAINLEVEL_OUTOF10: 0
PAINLEVEL_OUTOF10: 0

## 2020-04-26 ASSESSMENT — PAIN DESCRIPTION - LOCATION: LOCATION: BACK

## 2020-04-26 ASSESSMENT — PAIN DESCRIPTION - ONSET: ONSET: ON-GOING

## 2020-04-26 ASSESSMENT — PAIN DESCRIPTION - PAIN TYPE: TYPE: ACUTE PAIN

## 2020-04-26 ASSESSMENT — PAIN SCALES - WONG BAKER: WONGBAKER_NUMERICALRESPONSE: 0

## 2020-04-26 ASSESSMENT — PAIN DESCRIPTION - FREQUENCY: FREQUENCY: INTERMITTENT

## 2020-04-26 NOTE — PROGRESS NOTES
Perfect serve was sent to Dr. Bobby Isaac regarding the significant increase of the creatinine level. Electronically signed by Gustavo Hopkins on 4/26/2020 at 7:40 AM

## 2020-04-26 NOTE — FLOWSHEET NOTE
04:10 The continous pulse ox was discontnued per Damari RT.    05:18:patient was crying so intrepreter was obtained and she expressed that she needed more pain medicine for her left heel,she was also itchy    0530 she voiced so many times that she got the urge to void even thou she was told several times that she has a catheter that is draining her urine.

## 2020-04-26 NOTE — PROGRESS NOTES
systolic function is mildly reduced . essentially four chamber enlargement with significant valvular pathology   as outlined above. Assessment and Plan:        Summary: 42-year-old female with significant PVD (multiple toe amputations), obesity, type 2 diabetes, CAD (h/o CABG), chronic L foot wound (infected March 2020) presented from home for suspected syncope with preceding dizziness. She was found to have new worsening systolic dysfunction and had right and left heart catheterization showing acute combined heart failure as well as additional pulmonary hypertension (out of proportion to degree of HF). She was treated with stenting, IV diuresis, and medication optimization however her course was complicated by new RADHA on 4/26. 1. New Cardiomyopathy with severe mitral regurgitation / moderately-severe pulmonary hypertension: Right heart catheterization shows component of acute combined heart failure. It also shows pulmonary artery pressure elevated out of proportion to pulmonary capillary wedge pressure.  -Status post revascularization: Continue aspirin, Brilinta, high intensity statin  -Started low-dose Entresto- now held due to RADHA  -Continue Toprol-XL  -Added hydralazine and nitrates  -Treated with IV lasix gtt and transitioned to torsemide- now held due to RADHA  -Treat nocturnal hypoxia. Order placed for home oxygen at nighttime. She qualifies for nocturnal oxygen due to significant desaturation noted on noturnal pulse oximetry. Patient will need outpatient polysomnogram and CPAP titration. 2. RADHA secondary to contrast load and diuresis / ACEI  - r/o obstructive uropathy  - hold diuretic / entresto   - supportive IVF     3. Hyperkalemia in setting of CKD III: resolved    4. Syncope secondary to Problem #1    5.  Deconditioning due to medical conditions:  - Patient ordered a wheelchair due to the patient being unable to adequately ambulate long distances.   - Patient ordered a hospital bed due to the

## 2020-04-26 NOTE — PROGRESS NOTES
heart failure. Diffuse nonspecific bilateral groundglass opacities most significant within the right and left lower lobes at the lung bases may be infectious/inflammatory in etiology or related to pulmonary edema. All CT scans at this facility use dose modulation, iterative reconstruction, and/or weight based dosing when appropriate to reduce radiation dose to as low as reasonably achievable. XR CHEST PORTABLE   Final Result   NO ACUTE CHEST DISEASE.            CURRENT MEDICATIONS       sodium chloride  500 mL Intravenous Once    [Held by provider] sacubitril-valsartan  1 tablet Oral BID    [Held by provider] torsemide  20 mg Oral Daily    hydrALAZINE  50 mg Oral 3 times per day    sodium chloride flush  10 mL Intravenous 2 times per day    sodium chloride  500 mL Intra-arterial Once    docusate sodium  100 mg Oral BID    aspirin  81 mg Oral Daily    ticagrelor  90 mg Oral BID    isosorbide dinitrate  20 mg Oral TID    sodium chloride flush  10 mL Intravenous 2 times per day    diazePAM  5 mg Oral 30 Min Pre-Op    predniSONE  50 mg Oral Once    diphenhydrAMINE  50 mg Oral Once    metoprolol succinate  50 mg Oral Daily    sodium chloride flush  10 mL Intravenous 2 times per day    heparin (porcine)  5,000 Units Subcutaneous 3 times per day    insulin lispro  0-12 Units Subcutaneous TID WC    insulin lispro  0-6 Units Subcutaneous Nightly    amitriptyline  25 mg Oral Nightly    atorvastatin  40 mg Oral Daily    vitamin B-12  1,000 mcg Oral Daily    folic acid  1 mg Oral Daily    gabapentin  400 mg Oral BID    gabapentin  800 mg Oral Nightly    insulin glargine  35 Units Subcutaneous Nightly    insulin lispro  10 Units Subcutaneous TID AC    levothyroxine  50 mcg Oral Daily    sertraline  200 mg Oral Daily    mupirocin   Topical TID    collagenase   Topical Daily       INTRAVENOUS MEDICATIONS       sodium chloride      dextrose         ASSESSMENT

## 2020-04-26 NOTE — CARE COORDINATION
NO D/C TODAY. PER DR. Chiqui Ken PATIENT NOW IN RADHA. WILL NEED TO MONITOR. PATIENT WILL NEED AN OVERNIGHT POX PRIOR TO D/C. NURSING UPDATED.

## 2020-04-27 LAB
ANION GAP SERPL CALCULATED.3IONS-SCNC: 13 MEQ/L (ref 9–15)
BUN BLDV-MCNC: 30 MG/DL (ref 8–23)
CALCIUM SERPL-MCNC: 8.6 MG/DL (ref 8.5–9.9)
CHLORIDE BLD-SCNC: 103 MEQ/L (ref 95–107)
CO2: 25 MEQ/L (ref 20–31)
CREAT SERPL-MCNC: 1.94 MG/DL (ref 0.5–0.9)
GFR AFRICAN AMERICAN: 31.6
GFR NON-AFRICAN AMERICAN: 26.1
GLUCOSE BLD-MCNC: 154 MG/DL (ref 60–115)
GLUCOSE BLD-MCNC: 171 MG/DL (ref 70–99)
GLUCOSE BLD-MCNC: 180 MG/DL (ref 60–115)
GLUCOSE BLD-MCNC: 238 MG/DL (ref 60–115)
GLUCOSE BLD-MCNC: 370 MG/DL (ref 60–115)
PERFORMED ON: ABNORMAL
POTASSIUM REFLEX MAGNESIUM: 4.2 MEQ/L (ref 3.4–4.9)
SODIUM BLD-SCNC: 141 MEQ/L (ref 135–144)

## 2020-04-27 PROCEDURE — 6360000002 HC RX W HCPCS: Performed by: INTERNAL MEDICINE

## 2020-04-27 PROCEDURE — 6370000000 HC RX 637 (ALT 250 FOR IP): Performed by: INTERNAL MEDICINE

## 2020-04-27 PROCEDURE — 36415 COLL VENOUS BLD VENIPUNCTURE: CPT

## 2020-04-27 PROCEDURE — 97535 SELF CARE MNGMENT TRAINING: CPT

## 2020-04-27 PROCEDURE — 80048 BASIC METABOLIC PNL TOTAL CA: CPT

## 2020-04-27 PROCEDURE — 97110 THERAPEUTIC EXERCISES: CPT

## 2020-04-27 PROCEDURE — 2580000003 HC RX 258: Performed by: INTERNAL MEDICINE

## 2020-04-27 PROCEDURE — 2060000000 HC ICU INTERMEDIATE R&B

## 2020-04-27 PROCEDURE — 51702 INSERT TEMP BLADDER CATH: CPT

## 2020-04-27 PROCEDURE — 99233 SBSQ HOSP IP/OBS HIGH 50: CPT | Performed by: INTERNAL MEDICINE

## 2020-04-27 RX ORDER — ATORVASTATIN CALCIUM 80 MG/1
80 TABLET, FILM COATED ORAL DAILY
Status: DISCONTINUED | OUTPATIENT
Start: 2020-04-28 | End: 2020-04-28 | Stop reason: HOSPADM

## 2020-04-27 RX ADMIN — HEPARIN SODIUM 5000 UNITS: 5000 INJECTION INTRAVENOUS; SUBCUTANEOUS at 05:00

## 2020-04-27 RX ADMIN — ISOSORBIDE DINITRATE 20 MG: 20 TABLET ORAL at 08:58

## 2020-04-27 RX ADMIN — COLLAGENASE SANTYL: 250 OINTMENT TOPICAL at 14:40

## 2020-04-27 RX ADMIN — METOPROLOL SUCCINATE 50 MG: 50 TABLET, EXTENDED RELEASE ORAL at 08:58

## 2020-04-27 RX ADMIN — DOCUSATE SODIUM 100 MG: 100 CAPSULE, LIQUID FILLED ORAL at 21:02

## 2020-04-27 RX ADMIN — INSULIN GLARGINE 35 UNITS: 100 INJECTION, SOLUTION SUBCUTANEOUS at 21:02

## 2020-04-27 RX ADMIN — MUPIROCIN: 20 OINTMENT TOPICAL at 14:41

## 2020-04-27 RX ADMIN — AMITRIPTYLINE HYDROCHLORIDE 25 MG: 25 TABLET, FILM COATED ORAL at 21:01

## 2020-04-27 RX ADMIN — ISOSORBIDE DINITRATE 20 MG: 20 TABLET ORAL at 21:02

## 2020-04-27 RX ADMIN — HYDRALAZINE HYDROCHLORIDE 50 MG: 50 TABLET, FILM COATED ORAL at 21:01

## 2020-04-27 RX ADMIN — SODIUM CHLORIDE: 9 INJECTION, SOLUTION INTRAVENOUS at 11:33

## 2020-04-27 RX ADMIN — INSULIN LISPRO 10 UNITS: 100 INJECTION, SOLUTION INTRAVENOUS; SUBCUTANEOUS at 12:54

## 2020-04-27 RX ADMIN — DIAZEPAM 5 MG: 5 TABLET ORAL at 21:01

## 2020-04-27 RX ADMIN — CYANOCOBALAMIN TAB 500 MCG 1000 MCG: 500 TAB at 08:58

## 2020-04-27 RX ADMIN — TRAMADOL HYDROCHLORIDE 50 MG: 50 TABLET, FILM COATED ORAL at 05:09

## 2020-04-27 RX ADMIN — FOLIC ACID 1 MG: 1 TABLET ORAL at 08:58

## 2020-04-27 RX ADMIN — DOCUSATE SODIUM 100 MG: 100 CAPSULE, LIQUID FILLED ORAL at 08:58

## 2020-04-27 RX ADMIN — TRAMADOL HYDROCHLORIDE 50 MG: 50 TABLET, FILM COATED ORAL at 12:53

## 2020-04-27 RX ADMIN — ATORVASTATIN CALCIUM 40 MG: 40 TABLET, FILM COATED ORAL at 21:01

## 2020-04-27 RX ADMIN — INSULIN LISPRO 4 UNITS: 100 INJECTION, SOLUTION INTRAVENOUS; SUBCUTANEOUS at 16:29

## 2020-04-27 RX ADMIN — HYDRALAZINE HYDROCHLORIDE 50 MG: 50 TABLET, FILM COATED ORAL at 14:39

## 2020-04-27 RX ADMIN — ASPIRIN 81 MG: 81 TABLET, COATED ORAL at 08:58

## 2020-04-27 RX ADMIN — GABAPENTIN 400 MG: 400 CAPSULE ORAL at 08:58

## 2020-04-27 RX ADMIN — ACETAMINOPHEN 650 MG: 325 TABLET ORAL at 08:57

## 2020-04-27 RX ADMIN — HEPARIN SODIUM 5000 UNITS: 5000 INJECTION INTRAVENOUS; SUBCUTANEOUS at 21:02

## 2020-04-27 RX ADMIN — GABAPENTIN 800 MG: 400 CAPSULE ORAL at 21:01

## 2020-04-27 RX ADMIN — GABAPENTIN 400 MG: 400 CAPSULE ORAL at 14:39

## 2020-04-27 RX ADMIN — LEVOTHYROXINE SODIUM 50 MCG: 0.05 TABLET ORAL at 05:09

## 2020-04-27 RX ADMIN — ISOSORBIDE DINITRATE 20 MG: 20 TABLET ORAL at 14:39

## 2020-04-27 RX ADMIN — SERTRALINE 200 MG: 100 TABLET, FILM COATED ORAL at 08:58

## 2020-04-27 RX ADMIN — HYDRALAZINE HYDROCHLORIDE 50 MG: 50 TABLET, FILM COATED ORAL at 05:09

## 2020-04-27 RX ADMIN — SODIUM CHLORIDE: 9 INJECTION, SOLUTION INTRAVENOUS at 01:00

## 2020-04-27 RX ADMIN — HEPARIN SODIUM 5000 UNITS: 5000 INJECTION INTRAVENOUS; SUBCUTANEOUS at 12:52

## 2020-04-27 RX ADMIN — TICAGRELOR 90 MG: 90 TABLET ORAL at 21:02

## 2020-04-27 RX ADMIN — TICAGRELOR 90 MG: 90 TABLET ORAL at 08:58

## 2020-04-27 ASSESSMENT — PAIN DESCRIPTION - LOCATION
LOCATION: FOOT

## 2020-04-27 ASSESSMENT — PAIN DESCRIPTION - PAIN TYPE
TYPE: CHRONIC PAIN
TYPE: CHRONIC PAIN

## 2020-04-27 ASSESSMENT — PAIN SCALES - GENERAL
PAINLEVEL_OUTOF10: 10
PAINLEVEL_OUTOF10: 9
PAINLEVEL_OUTOF10: 0
PAINLEVEL_OUTOF10: 9
PAINLEVEL_OUTOF10: 10
PAINLEVEL_OUTOF10: 9

## 2020-04-27 ASSESSMENT — PAIN DESCRIPTION - ORIENTATION
ORIENTATION: LEFT;RIGHT
ORIENTATION: RIGHT;LEFT
ORIENTATION: LEFT;RIGHT

## 2020-04-27 ASSESSMENT — PAIN SCALES - WONG BAKER: WONGBAKER_NUMERICALRESPONSE: 0

## 2020-04-27 NOTE — PROGRESS NOTES
Physician Progress Note    2020   10:45 AM    Name:  Wang Joyce  MRN:    [id number]     IP Day: 5     Admit Date: 2020 10:42 AM  PCP: John Zamudio    Code Status:  Full Code    Subjective:      no new events. Spoke to patient using  phone. Denies chest pain and dyspnea. Has pain in her bilateral lower ext, chronic. No abd pain. Physical Examination:      Vitals:  BP (!) 144/54   Pulse 92   Temp 97.3 °F (36.3 °C) (Oral)   Resp 18   Ht 5' 4\" (1.626 m)   Wt 231 lb 11.2 oz (105.1 kg)   SpO2 98%   BMI 39.77 kg/m²   Temp (24hrs), Av.5 °F (36.4 °C), Min:97.3 °F (36.3 °C), Max:97.8 °F (36.6 °C)      General appearance: alert, cooperative and no distress  Mental Status: oriented to person, place and time and normal affect  Lungs: clear to auscultation bilaterally, normal effort  Heart: regular rate and rhythm, no murmur  Abdomen: soft, nontender, nondistended, bowel sounds present, no masses  Extremities: no tenderness in the calves  Skin: no gross lesions, rashes    Data:     Labs:  Recent Labs     20  0543   WBC 8.5   HGB 11.6*        Recent Labs     20  0840 20  0613   * 141   K 3.9 4.2   CL 91* 103   CO2 27 25   BUN 31* 30*   CREATININE 3.27* 1.94*   GLUCOSE 217* 171*     No results for input(s): AST, ALT, ALB, BILITOT, ALKPHOS in the last 72 hours.     Current Facility-Administered Medications   Medication Dose Route Frequency Provider Last Rate Last Dose    0.9 % sodium chloride infusion   Intravenous Continuous Lesly Hodges  mL/hr at 20 0100      [Held by provider] torsemide (DEMADEX) tablet 20 mg  20 mg Oral Daily Stanford Phoenix, MD   20 mg at 20 1358    hydrALAZINE (APRESOLINE) tablet 50 mg  50 mg Oral 3 times per day Clarisa Thompson MD   50 mg at 20 0509    sodium chloride flush 0.9 % injection 10 mL  10 mL Intravenous PRN Clarisa Thompson MD        0.9 % sodium chloride bolus  500 mL Intra-arterial Once (SYNTHROID) tablet 50 mcg  50 mcg Oral Daily Briseyda Moser MD   50 mcg at 04/27/20 0509    sertraline (ZOLOFT) tablet 200 mg  200 mg Oral Daily Briseyda Moser MD   200 mg at 04/27/20 0858    mupirocin (BACTROBAN) 2 % ointment   Topical TID Briseyda Moser MD        collagenase ointment   Topical Daily Briseyda Moser MD        albuterol sulfate  (90 Base) MCG/ACT inhaler 2 puff  2 puff Inhalation Q4H PRN Briseyda Moser MD         Assessment and Plan: Active problems:    # new CMP with severe MR and mod- severe pHTN  - s/p RHC- acute combined HF  - Continue aspirin, Brilinta, high intensity statin  - cardiology is following  - -Started low-dose Entresto- now held due to RADHA  -Continue Toprol-XL  -Added hydralazine and nitrates  -Treated with IV lasix gtt and transitioned to torsemide- now held due to RADHA  -Treat nocturnal hypoxia. Order placed for home oxygen at nighttime. She qualifies for nocturnal oxygen due to significant desaturation noted on noturnal pulse oximetry. Patient will need outpatient polysomnogram and CPAP titration. # RADHA   - improving. Monitor. Possible dc in 1-2 days if renal function continues to improve     # pHTN   - pulm consult     # DM2  - resume SSI, card control diet.      # deconditioning     DVT/PPx        DISCHARGE PLANNING  Home in 1-2 days        Electronically signed by Martina Salmeron DO on 4/27/2020 at 10:45 AM

## 2020-04-27 NOTE — PROGRESS NOTES
Cardiology Progress Note      Date:  4/27/2020    Patient:  Blade Childers     YOB: 1955  MRN:  [id number]    [address] [facility] Cardiologist:  Evelio Stone MD    Primary Cardiologist: Dr. Jani Stubbs was seen and examined today at bedside. Her overnight history is unchanged. No chest pain or shortness of breath. No Edema. She is lying flat in bed. Poor appetite. AM labs reviewed and show a significant rise in BUN/Cr. ROS Otherwise unchanged. ASSESSMENT     1. Dizziness / possible syncope. 2. Negative troponin,no acute EKG abnormality, doubt ACS. 3. Acute systolic CHF with deterioration of LVEF from 6/19 and worsening MR and pulmonary hypertension. 4. ICM, 45%  5. MR, 3+  6. CAD, Status post PCI / stent of the ostial circumflex. 7. Elevated BNP. 8. PHTN  9. Acute kidney injury on CKD. Likely multifactorial including poor oral intake of fluids, contrast induced nephropathy and Entresto. 10. Extensive vascular disease, treated by [facility]. 11. Tendency for hyperkalemia ,hence will be difficult to maximise ACEI/ARB /Entresto  12. Acute on chronic biventricular systolic heart failure. 13. HTN  14. HLP  15. DM.   16. Probable JESICA      PLAN     Cardiac Supportive Care. Cautious hydration. Continue hydralazine and nitrates. Continue Brilinta. Monitor renal function closely. Pulmonary Evalutation   Eventual [facility] outpatient care for PVD. Discharge Planning. See Orders. -------------------------------------------  -------------------------------------------    Consult HPI:  Blade Childers is a 60 y.o. female who presented per ER notes Blade Childers is a 60 y.o. female who presents to the emergency department for evaluation of mental status changes and fall/syncope while at home. This was earlier this morning, \"still dark. \"  Pt currently alert and oriented. No current complaints. see, did not appreciate lateral wall hypokinesis. There is moderate to severe pulmonary hypertension RV dilatation and biatrial enlargement. These findings of mitral regurgitation and pulmonary hypertension were not reported prior to May 2019. She does not complain of angina, but has shortness of breath, and she is a diabetic. She also has chronic kidney disease. She reportedly had sleep study was told she had sleep apnea, but has not been able to get CPAP therapy. My recommendations are: To proceed with right and left heart catheterization and possible intervention, risks benefits alternatives discussed via , risks discussed included but were not limited to MI, stroke, death, peripheral vascular compromise and allergic reaction to dye and kidney failure. Based on schedule procedure will be done tomorrow afternoon. She can be n.p.o. after breakfast.  If there is rapid decline in renal function, will have to postpone procedure. I have initiated hydralazine and nitrates for preload and afterload reduction. She is not orthostatic. VITALS     Vitals:    04/26/20 1527 04/26/20 1905 04/27/20 0509 04/27/20 0805   BP:  (!) 150/64 (!) 123/58 (!) 144/54   Pulse:  76 74 92   Resp:  18  18   Temp: 97.8 °F (36.6 °C) 97.5 °F (36.4 °C)  97.3 °F (36.3 °C)   TempSrc:  Axillary  Oral   SpO2:  99%  98%   Weight:   231 lb 11.2 oz (105.1 kg)    Height:           Intake/Output Summary (Last 24 hours) at 4/27/2020 1123  Last data filed at 4/27/2020 0515  Gross per 24 hour   Intake 2924 ml   Output 2500 ml   Net 424 ml       Patient Vitals for the past 96 hrs (Last 3 readings):   Weight   04/27/20 0509 231 lb 11.2 oz (105.1 kg)   04/24/20 0610 238 lb 12.8 oz (108.3 kg)       PHYSICAL EXAM   GENERAL:  Well developed, well nourished, in no acute distress. CHEST:  Symmetric and nontender. NEURO/PSYCH:  Alert and oriented times three with approppriate behavior and responses.   NECK:  Supple, no JVD, no THAN 50% STENOSIS OF THE RIGHT INTERNAL CAROTID ARTERY IDENTIFIED ON TODAY'S EXAMINATION. ANTEGRADE FLOW OF THE BILATERAL VERTEBRAL ARTERIES. Validated velocity measurements with angiographic measurements, velocity criteria are extrapolated from diameter data as defined by the Society of Radiologist in [address] [place] [address] Radiology 2003; 048;705-407. CT Head WO Contrast   Final Result      No acute intracranial process. All CT scans at this facility use dose modulation, iterative reconstruction, and/or weight based dosing when appropriate to reduce radiation dose to as low as reasonably achievable. CT Cervical Spine WO Contrast   Final Result      No acute fracture or malalignment. All CT scans at this facility use dose modulation, iterative reconstruction, and/or weight based dosing when appropriate to reduce radiation dose to as low as reasonably achievable. CTA Chest W WO Contrast   Final Result      Respiratory motion artifact precludes evaluation for subsegmental pulmonary emboli of the right and left lower lobes at the bases. No large central pulmonary embolism identified. Cardiomegaly. Reflux of contrast into the hepatic veins suggests right heart failure. Diffuse nonspecific bilateral groundglass opacities most significant within the right and left lower lobes at the lung bases may be infectious/inflammatory in etiology or related to pulmonary edema. All CT scans at this facility use dose modulation, iterative reconstruction, and/or weight based dosing when appropriate to reduce radiation dose to as low as reasonably achievable. XR CHEST PORTABLE   Final Result   NO ACUTE CHEST DISEASE.            CURRENT MEDICATIONS       [Held by provider] torsemide  20 mg Oral Daily    hydrALAZINE  50 mg Oral 3 times per day    sodium chloride  500 mL Intra-arterial Once    docusate sodium  100 mg Oral BID    aspirin  81 mg Oral Daily

## 2020-04-27 NOTE — PROGRESS NOTES
1.94*   GLUCOSE 217* 171*   CALCIUM 8.2* 8.6   LABGLOM 14.3* 26.1*   GFRAA 17.3* 31.6*       MV Settings:          No results for input(s): PHART, XHZ5FMX, PO2ART, WUN2XBI, BEART, C5KRIYCC in the last 72 hours. O2 Device: None (Room air)  O2 Flow Rate (L/min): 2 L/min    DIET CARDIAC; Carb Control: 5 carbs/meal (approximate 2000 kcals/day); Low Sodium (2 GM);  Low Potassium     MEDICATIONS during current hospitalization:    Continuous Infusions:   sodium chloride 100 mL/hr at 04/27/20 0100    dextrose         Scheduled Meds:   [Held by provider] torsemide  20 mg Oral Daily    hydrALAZINE  50 mg Oral 3 times per day    sodium chloride  500 mL Intra-arterial Once    docusate sodium  100 mg Oral BID    aspirin  81 mg Oral Daily    ticagrelor  90 mg Oral BID    isosorbide dinitrate  20 mg Oral TID    sodium chloride flush  10 mL Intravenous 2 times per day    diazePAM  5 mg Oral 30 Min Pre-Op    predniSONE  50 mg Oral Once    diphenhydrAMINE  50 mg Oral Once    metoprolol succinate  50 mg Oral Daily    heparin (porcine)  5,000 Units Subcutaneous 3 times per day    insulin lispro  0-12 Units Subcutaneous TID WC    insulin lispro  0-6 Units Subcutaneous Nightly    amitriptyline  25 mg Oral Nightly    atorvastatin  40 mg Oral Daily    vitamin B-12  1,000 mcg Oral Daily    folic acid  1 mg Oral Daily    gabapentin  400 mg Oral BID    gabapentin  800 mg Oral Nightly    insulin glargine  35 Units Subcutaneous Nightly    insulin lispro  10 Units Subcutaneous TID AC    levothyroxine  50 mcg Oral Daily    sertraline  200 mg Oral Daily    mupirocin   Topical TID    collagenase   Topical Daily       PRN Meds:sodium chloride flush, acetaminophen, magnesium hydroxide, ondansetron, hydrALAZINE, sodium chloride flush, nitroGLYCERIN, traMADol, sodium chloride flush, acetaminophen **OR** acetaminophen, polyethylene glycol, ondansetron, glucose, dextrose, glucagon (rDNA), dextrose, ALPRAZolam, hydrOXYzine, albuterol sulfate HFA    Radiology  CT chest shows hazy groundglass opacities with mosaic attenuation, slightly dilated esophagus, no infiltrate    Right heart catheter shows mean arterial pulmonary pressure of 41, wedge pressure 20, pulmonary vascular resistance cannot be calculated      IMPRESSION AND SUGGESTION:  Patient is at risk due to   · Pulmonary hypertension, based on wedge pressure this is mainly group 2 owing to left heart disease 2.1 and 2.3 ,, also need to rule out group 3 mainly JESICA . Due to history of lupus in the family will obtain MICHELLE  · Morbid obesity  · Possible JESICA    Recommendation  · Continue cardioprotective medications  · Maintain euvolemic, slightly dry  · Watch volume status and avoid overload  · Check MICHELLE  · Lose weight  · Sleep study as outpatient        I spent 30 min with this patient, greater the 50% of this time was spent in counseling and/or coordinating of care.     Electronically signed by Hilario Hopkins MD,  FCCP   on 4/27/2020 at 10:50 AM

## 2020-04-27 NOTE — PROGRESS NOTES
artery disease)     CKD (chronic kidney disease) stage 3, GFR 30-59 ml/min ([facility])     Colitis     Diabetes mellitus [facility]     Hyperlipidemia     Hypertension     PAD (peripheral artery disease) ([facility])     Prolonged emergence from general anesthesia     PVD (peripheral vascular disease) ([facility]     Thyroid disease      Past Surgical History:   Procedure Laterality Date    CARDIAC SURGERY       SECTION      COLONOSCOPY N/A 2019    COLONOSCOPY DIAGNOSTIC performed by Tuan Peck MD at [facility] GRAFT  2019    unknown vessels    HYSTERECTOMY      TOE AMPUTATION Right     3rd toe       Restrictions       SUBJECTIVE   General  Chart Reviewed: Yes  Family / Caregiver Present: No  Subjective  Subjective: \"Pee\" . No pain and okay to work with therapy   General Comment  Comments: Pt primary Martiniquais speaking but knows some [place].     Pre-Session Pain Report     Pain Screening  Patient Currently in Pain: Yes  Pre Treatment Pain Screening  Pain at present: 9  Scale Used: Numeric Score  Intervention List: Patient able to continue with treatment  Comments / Details: states she had medication for pain    Post-Session Pain Report  Pain Assessment  Pain Assessment: 0-10  Pain Level: 9  Pain Location: Foot  Pain Orientation: Left;Right         OBJECTIVE        Bed mobility  Rolling to Left: Modified independent  Rolling to Right: Modified independent  Supine to Sit: Modified independent  Sit to Supine: Modified independent  Scooting: Modified independent  Comment: slow moving but able to perform when given time     Transfers  Sit to Stand: Supervision  Stand to sit: Supervision  Comment: pt declined to transfer to chair this date,states she would rather lay back in bed at tx cessation     Ambulation  Ambulation?: No(declined at this time due to pain in feet )                    Exercises  Hip Flexion: x10  Knee Long Arc Quad: x10  Ankle Pumps: x10  Comments: rest breaks needed due to pt becoming upset and crying because she misses her family and wants to go home. Visual demos used for proper tech with thereex. encouragment given to pt   Other exercises  Other exercises?: Yes  Other exercises 1: static and dynamic standing at [employer] x5 mins  Other exercises 2: lateral rocking at [employer]                     Activity Tolerance  Activity Tolerance: Patient Tolerated treatment well  Activity Tolerance: pt began crying during treatment because she misses her family and wants to go home. pt needed redirection and encouragment to continue, but when given that pt was able and willing to perform tasks           ASSESSMENT     Pt demonstrated independence with bed mobility and performed transfers at a supervision level with no LOB and with good standing balance. Pt reports pain in B feet and therefore declined to walk at this time. Pt needing VC and demonstration as well as encouragement during session. Discharge Recommendations:  Continue to assess pending progress    Goals  Long term goals  Long term goal 1: Patient will be independent with bed mobility. Long term goal 2: Patient will be independent with transfers. Long term goal 3: Patient will be independent with 150ft of gait using LRD. Long term goal 4: Patient will be independent with HEP. PLAN    Times per week: 2-3 visits  Safety Devices  Type of devices: Call light within reach     [facility] (6 CLICK) [address] Mobility Raw Score : 22     Therapy Time   Individual   Time In 1050   Time Out 1115   Minutes 25         Thereex:15  BM/Transfer: [address], PTA, 04/27/20 at 11:21 AM         Definitions for assistance levels  Independent = pt does not require any physical supervision or assistance from another person for activity completion. Device may be needed.   Stand by assistance = pt requires verbal cues or instructions from another person, close to but not touching, to perform the activity  Minimal

## 2020-04-28 ENCOUNTER — APPOINTMENT (OUTPATIENT)
Dept: GENERAL RADIOLOGY | Age: 65
DRG: 175 | End: 2020-04-28
Payer: COMMERCIAL

## 2020-04-28 VITALS
OXYGEN SATURATION: 100 % | HEIGHT: 64 IN | BODY MASS INDEX: 39.56 KG/M2 | DIASTOLIC BLOOD PRESSURE: 62 MMHG | WEIGHT: 231.7 LBS | RESPIRATION RATE: 18 BRPM | HEART RATE: 75 BPM | SYSTOLIC BLOOD PRESSURE: 142 MMHG | TEMPERATURE: 98.2 F

## 2020-04-28 LAB
ALBUMIN SERPL-MCNC: 3.1 G/DL (ref 3.5–4.6)
ALP BLD-CCNC: 91 U/L (ref 40–130)
ALT SERPL-CCNC: 17 U/L (ref 0–33)
ANION GAP SERPL CALCULATED.3IONS-SCNC: 11 MEQ/L (ref 9–15)
AST SERPL-CCNC: 18 U/L (ref 0–35)
BILIRUB SERPL-MCNC: 0.4 MG/DL (ref 0.2–0.7)
BUN BLDV-MCNC: 21 MG/DL (ref 8–23)
C-REACTIVE PROTEIN, HIGH SENSITIVITY: 44.2 MG/L (ref 0–5)
CALCIUM SERPL-MCNC: 8.6 MG/DL (ref 8.5–9.9)
CHLORIDE BLD-SCNC: 107 MEQ/L (ref 95–107)
CO2: 23 MEQ/L (ref 20–31)
CREAT SERPL-MCNC: 1.18 MG/DL (ref 0.5–0.9)
EKG ATRIAL RATE: 61 BPM
EKG ATRIAL RATE: 75 BPM
EKG ATRIAL RATE: 79 BPM
EKG P AXIS: 41 DEGREES
EKG P AXIS: 48 DEGREES
EKG P AXIS: 50 DEGREES
EKG P-R INTERVAL: 182 MS
EKG P-R INTERVAL: 188 MS
EKG P-R INTERVAL: 218 MS
EKG Q-T INTERVAL: 396 MS
EKG Q-T INTERVAL: 422 MS
EKG Q-T INTERVAL: 446 MS
EKG QRS DURATION: 106 MS
EKG QRS DURATION: 108 MS
EKG QRS DURATION: 110 MS
EKG QTC CALCULATION (BAZETT): 448 MS
EKG QTC CALCULATION (BAZETT): 454 MS
EKG QTC CALCULATION (BAZETT): 471 MS
EKG R AXIS: 41 DEGREES
EKG R AXIS: 66 DEGREES
EKG R AXIS: 68 DEGREES
EKG T AXIS: -63 DEGREES
EKG T AXIS: -69 DEGREES
EKG T AXIS: 218 DEGREES
EKG VENTRICULAR RATE: 61 BPM
EKG VENTRICULAR RATE: 75 BPM
EKG VENTRICULAR RATE: 79 BPM
GFR AFRICAN AMERICAN: 56.1
GFR NON-AFRICAN AMERICAN: 46.3
GLOBULIN: 3.3 G/DL (ref 2.3–3.5)
GLUCOSE BLD-MCNC: 164 MG/DL (ref 70–99)
GLUCOSE BLD-MCNC: 205 MG/DL (ref 60–115)
GLUCOSE BLD-MCNC: 206 MG/DL (ref 60–115)
HCT VFR BLD CALC: 35.2 % (ref 37–47)
HEMOGLOBIN: 11.5 G/DL (ref 12–16)
MAGNESIUM: 1.9 MG/DL (ref 1.7–2.4)
MCH RBC QN AUTO: 25 PG (ref 27–31.3)
MCHC RBC AUTO-ENTMCNC: 32.6 % (ref 33–37)
MCV RBC AUTO: 76.7 FL (ref 82–100)
PDW BLD-RTO: 18.1 % (ref 11.5–14.5)
PERFORMED ON: ABNORMAL
PERFORMED ON: ABNORMAL
PLATELET # BLD: 208 K/UL (ref 130–400)
POTASSIUM REFLEX MAGNESIUM: 4.5 MEQ/L (ref 3.4–4.9)
POTASSIUM SERPL-SCNC: 4.5 MEQ/L (ref 3.4–4.9)
RBC # BLD: 4.59 M/UL (ref 4.2–5.4)
SEDIMENTATION RATE, ERYTHROCYTE: 34 MM (ref 0–30)
SODIUM BLD-SCNC: 141 MEQ/L (ref 135–144)
TOTAL PROTEIN: 6.4 G/DL (ref 6.3–8)
WBC # BLD: 8.5 K/UL (ref 4.8–10.8)

## 2020-04-28 PROCEDURE — 6370000000 HC RX 637 (ALT 250 FOR IP): Performed by: INTERNAL MEDICINE

## 2020-04-28 PROCEDURE — 99232 SBSQ HOSP IP/OBS MODERATE 35: CPT | Performed by: INTERNAL MEDICINE

## 2020-04-28 PROCEDURE — 85027 COMPLETE CBC AUTOMATED: CPT

## 2020-04-28 PROCEDURE — 86141 C-REACTIVE PROTEIN HS: CPT

## 2020-04-28 PROCEDURE — 83735 ASSAY OF MAGNESIUM: CPT

## 2020-04-28 PROCEDURE — 71046 X-RAY EXAM CHEST 2 VIEWS: CPT

## 2020-04-28 PROCEDURE — 94761 N-INVAS EAR/PLS OXIMETRY MLT: CPT

## 2020-04-28 PROCEDURE — 93005 ELECTROCARDIOGRAM TRACING: CPT | Performed by: INTERNAL MEDICINE

## 2020-04-28 PROCEDURE — 97116 GAIT TRAINING THERAPY: CPT

## 2020-04-28 PROCEDURE — 97535 SELF CARE MNGMENT TRAINING: CPT

## 2020-04-28 PROCEDURE — 51702 INSERT TEMP BLADDER CATH: CPT

## 2020-04-28 PROCEDURE — 36415 COLL VENOUS BLD VENIPUNCTURE: CPT

## 2020-04-28 PROCEDURE — 80053 COMPREHEN METABOLIC PANEL: CPT

## 2020-04-28 PROCEDURE — 85652 RBC SED RATE AUTOMATED: CPT

## 2020-04-28 PROCEDURE — 94618 PULMONARY STRESS TESTING: CPT

## 2020-04-28 PROCEDURE — 6360000002 HC RX W HCPCS: Performed by: INTERNAL MEDICINE

## 2020-04-28 RX ORDER — ISOSORBIDE DINITRATE 20 MG/1
20 TABLET ORAL 3 TIMES DAILY
Qty: 90 TABLET | Refills: 3 | Status: SHIPPED | OUTPATIENT
Start: 2020-04-28

## 2020-04-28 RX ORDER — METOPROLOL SUCCINATE 50 MG/1
50 TABLET, EXTENDED RELEASE ORAL 2 TIMES DAILY
Qty: 30 TABLET | Refills: 3 | Status: ON HOLD | OUTPATIENT
Start: 2020-04-28 | End: 2022-01-01 | Stop reason: HOSPADM

## 2020-04-28 RX ORDER — NITROGLYCERIN 0.4 MG/1
TABLET SUBLINGUAL
Qty: 25 TABLET | Refills: 3 | Status: SHIPPED | OUTPATIENT
Start: 2020-04-28

## 2020-04-28 RX ORDER — HYDRALAZINE HYDROCHLORIDE 50 MG/1
50 TABLET, FILM COATED ORAL EVERY 8 HOURS SCHEDULED
Qty: 90 TABLET | Refills: 3 | Status: ON HOLD | OUTPATIENT
Start: 2020-04-28 | End: 2022-01-01 | Stop reason: SDUPTHER

## 2020-04-28 RX ORDER — METOPROLOL SUCCINATE 50 MG/1
50 TABLET, EXTENDED RELEASE ORAL 2 TIMES DAILY
Status: DISCONTINUED | OUTPATIENT
Start: 2020-04-28 | End: 2020-04-28 | Stop reason: HOSPADM

## 2020-04-28 RX ORDER — ATORVASTATIN CALCIUM 40 MG/1
80 TABLET, FILM COATED ORAL DAILY
Qty: 30 TABLET | Refills: 3 | Status: ON HOLD | OUTPATIENT
Start: 2020-04-28 | End: 2020-06-29 | Stop reason: HOSPADM

## 2020-04-28 RX ADMIN — HEPARIN SODIUM 5000 UNITS: 5000 INJECTION INTRAVENOUS; SUBCUTANEOUS at 04:03

## 2020-04-28 RX ADMIN — HYDRALAZINE HYDROCHLORIDE 50 MG: 50 TABLET, FILM COATED ORAL at 06:05

## 2020-04-28 RX ADMIN — SERTRALINE 200 MG: 100 TABLET, FILM COATED ORAL at 08:23

## 2020-04-28 RX ADMIN — TRAMADOL HYDROCHLORIDE 50 MG: 50 TABLET, FILM COATED ORAL at 08:22

## 2020-04-28 RX ADMIN — INSULIN LISPRO 4 UNITS: 100 INJECTION, SOLUTION INTRAVENOUS; SUBCUTANEOUS at 08:25

## 2020-04-28 RX ADMIN — DOCUSATE SODIUM 100 MG: 100 CAPSULE, LIQUID FILLED ORAL at 08:22

## 2020-04-28 RX ADMIN — CYANOCOBALAMIN TAB 500 MCG 1000 MCG: 500 TAB at 08:22

## 2020-04-28 RX ADMIN — ISOSORBIDE DINITRATE 20 MG: 20 TABLET ORAL at 15:41

## 2020-04-28 RX ADMIN — HYDRALAZINE HYDROCHLORIDE 50 MG: 50 TABLET, FILM COATED ORAL at 15:41

## 2020-04-28 RX ADMIN — COLLAGENASE SANTYL: 250 OINTMENT TOPICAL at 12:07

## 2020-04-28 RX ADMIN — FOLIC ACID 1 MG: 1 TABLET ORAL at 08:22

## 2020-04-28 RX ADMIN — GABAPENTIN 400 MG: 400 CAPSULE ORAL at 15:41

## 2020-04-28 RX ADMIN — MUPIROCIN: 20 OINTMENT TOPICAL at 12:07

## 2020-04-28 RX ADMIN — ISOSORBIDE DINITRATE 20 MG: 20 TABLET ORAL at 08:23

## 2020-04-28 RX ADMIN — INSULIN LISPRO 4 UNITS: 100 INJECTION, SOLUTION INTRAVENOUS; SUBCUTANEOUS at 12:06

## 2020-04-28 RX ADMIN — METOPROLOL SUCCINATE 50 MG: 50 TABLET, EXTENDED RELEASE ORAL at 08:22

## 2020-04-28 RX ADMIN — GABAPENTIN 400 MG: 400 CAPSULE ORAL at 08:22

## 2020-04-28 RX ADMIN — ASPIRIN 81 MG: 81 TABLET, COATED ORAL at 08:22

## 2020-04-28 RX ADMIN — LEVOTHYROXINE SODIUM 50 MCG: 0.05 TABLET ORAL at 06:05

## 2020-04-28 RX ADMIN — TICAGRELOR 90 MG: 90 TABLET ORAL at 08:23

## 2020-04-28 ASSESSMENT — PAIN SCALES - GENERAL
PAINLEVEL_OUTOF10: 0
PAINLEVEL_OUTOF10: 8
PAINLEVEL_OUTOF10: 0

## 2020-04-28 ASSESSMENT — PAIN DESCRIPTION - LOCATION: LOCATION: FOOT

## 2020-04-28 ASSESSMENT — PAIN DESCRIPTION - ORIENTATION: ORIENTATION: RIGHT;LEFT

## 2020-04-28 ASSESSMENT — PAIN DESCRIPTION - PAIN TYPE: TYPE: CHRONIC PAIN

## 2020-04-28 NOTE — PROGRESS NOTES
CLINICAL PHARMACY NOTE: MEDS TO [address] [place] [address] Drive Select Patient?: No  Total # of Prescriptions Filled: 5   The following medications were delivered to the patient:  · Metoprolol er 50 mg tab  · Hydralazine 50 mg tab  · Nitroglycerin 0.4 mg sub tab  · Atorvastatin 40 mg tab  · brilinta 90 mg tab  ·   Total # of Interventions Completed: 0  Time Spent (min): 60    Additional Documentation:

## 2020-04-28 NOTE — DISCHARGE SUMMARY
edema bilaterally. Full range of motion without deformity. Skin: Skin color, texture, turgor normal.  No rashes or lesions. Neuro: Non Focal. Symetrical motor and tone. Nl Comprehension, Alert,awake and oriented. NL CN. Symetrical tone and reflexes. Psychiatric: Alert and oriented, thought content appropriate, normal insight  Capillary Refill: Brisk,< 3 seconds   Peripheral Pulses: +2 palpable, equal bilaterally     Patient was seen by the following consultants while admitted to [facility]:   Consults:  Stoney Shepard  IP CONSULT TO PULMONOLOGY  IP CONSULT TO CARDIAC REHAB  IP CONSULT TO PULMONOLOGY    Significant Diagnostic Studies:    Refer to chart     Please refer to chart if no studies are shown here    Ct Head Wo Contrast    Result Date: 4/21/2020  EXAMINATION: CT of the brain without contrast HISTORY: Dizziness. Altered mental status. Fall. COMPARISON: None available TECHNIQUE: Multiple contiguous axial images were obtained of the brain from the skull base through the vertex. Multiplanar reformats were obtained. FINDINGS: Prominence of the sulci and ventricles compatible with mild generalized parenchymal volume loss. Gray-white matter differentiation is preserved. No acute hemorrhage or abnormal extra-axial fluid collection. No mass effect or midline shift. The visualized paranasal sinuses and mastoid air cells are clear. Calvarium is intact. No acute intracranial process. All CT scans at this facility use dose modulation, iterative reconstruction, and/or weight based dosing when appropriate to reduce radiation dose to as low as reasonably achievable. Cta Chest W Wo Contrast    Result Date: 4/21/2020  EXAM: CTA CHEST W WO CONTRAST History: Chest pain. Shortness of breath.  Technique: Multiple contiguous axial images of the chest were obtained from the thoracic inlet through the upper abdomen with contrast. Multiplanar reformats including axial

## 2020-04-28 NOTE — PROGRESS NOTES
arterial pulmonary pressure of 41, wedge pressure 20, pulmonary vascular resistance cannot be calculated      IMPRESSION AND SUGGESTION:  Patient is at risk due to   · Pulmonary hypertension, based on wedge pressure this is mainly group 2 owing to left heart disease 2.1 and 2.3 ,, also need to rule out group 3 mainly JESICA .   Due to history of lupus in the family will obtain MICHELLE  · Morbid obesity  · Possible JESICA    Recommendation  · Continue cardioprotective medications  · Maintain euvolemic, slightly dry  · Watch volume status and avoid overload  · Consider repeat echo after sleep apnea is addressed and cardiac function optimized to evaluate RVSP>>> ? repeat right heart cath  · Follow-up MICHELLE result  · Lose weight  · Will need sleep study as outpatient  · Follow up with Dr Felicia Valenzuela in 2 weeks post discharge        Electronically signed by Nuha Gallardo MD,  FCCP   on 4/28/2020 at 11:08 AM

## 2020-04-28 NOTE — ADT AUTH CERT
Patient Demographics     Name Patient ID SSN Gender Identity Birth Date   Royce Walls [id number]  Female [birth year] (60 yrs)   Address Phone Email Employer    [address]  [address] [place]  21 [phone] (H)  [address] [employer] Occupation Emp Status    [employer]  - Not Employed    Reg Status PCP Date Last Verified Next Review Date    Verified Colleen Stone  [phone] 20    Admission Date Discharge Date Admitting Provider     20  Negin Kapadia DO     Marital Status Anabaptism       Episcopalian      Emergency Contact [address] [facility] (2)  [phone] (H)   Garth Mason Account [de-identified]   CVG Subscriber Name/Sex/Relation Subscriber  Subscriber Address/Phone Subscriber Emp/Emp Phone   1.  Rogelio Ma  [id number] Sunny Archibald - Female  (Self) 1955 [address] [facility]  [address] [place], [place]  [address] [phone](D) NONE   Utilization Reviews         Syncope - Care Day 6 (2020) by Jeffrey Cates RN         Review Status Review Entered   Completed 2020 15:57       Criteria Review      Care Day: 6 Care Date: 2020 Level of Care: Intermediate Care    Guideline Day 2    Level Of Care    ( ) Intermediate care or telemetry to discharge    2020 3:57 PM EDT by Maik Arango      tele unit    ( ) Complete discharge planning    2020 3:57 PM EDT by Maik Arango      per     Clinical Status    (X) * Hemodynamic stability    2020 3:57 PM EDT by Tracie Phillips      temp 97.2, pulse 79, resp 18, bp 130/51, pulse ox 100 % n ra    (X) * No acute cardiac or neurologic events    (X) * Mental status at baseline    (X) * Dangerous arrhythmia absent    (X) * No further syncope    ( ) * No evidence of etiology requiring ongoing inpatient care or monitoring (eg, unstable cardiac rhythm or conduction defect)    ( ) * Discharge plans and education understood    2020 3:57 PM EDT by Everette Phillips      no d/c this day    Activity    (X) * Ambulatory    4/28/2020 3:57 PM EDT by Zachary burton as minerva    Routes    (X) * Oral hydration, medications, and diet    4/28/2020 3:57 PM EDT by Denis Phillips- cardiac carb control diet    iv- ns iv at 100 cc/h    prn- tylenol 650 mg po prn x1, ultram 50 mg po prn x2    Interventions    (X) Possible cardiac monitoring    * Milestone   Additional Notes   4/27/2020  care day 6   Physical Exam   General appearance: alert, cooperative and no distress   Mental Status: oriented to person, place and time and normal affect   Lungs: clear to auscultation bilaterally, normal effort   Heart: regular rate and rhythm, no murmur   Abdomen: soft, nontender, nondistended, bowel sounds present, no masses   Extremities: no tenderness in the calves   Skin: no gross lesions, rashes      Radiology testing/Labs pertinent   Bun 30   Creat 1.94   Gfr 26.1   Gluc 171      Meds-name, dose, route,rate   Scheduled Meds:   metoprolol succinate, 50 mg, Oral, BID   atorvastatin, 80 mg, Oral, Daily   hydrALAZINE, 50 mg, Oral, 3 times per day   docusate sodium, 100 mg, Oral, BID   aspirin, 81 mg, Oral, Daily   ticagrelor, 90 mg, Oral, BID   isosorbide dinitrate, 20 mg, Oral, TID   predniSONE, 50 mg, Oral, Once   diphenhydrAMINE, 50 mg, Oral, Once   amitriptyline, 25 mg, Oral, Nightly   vitamin B-12, 1,000 mcg, Oral, Daily   folic acid, 1 mg, Oral, Daily   gabapentin, 400 mg, Oral, BID   gabapentin, 800 mg, Oral, Nightly   levothyroxine, 50 mcg, Oral, Daily   sertraline, 200 mg, Oral, Daily   mupirocin, , Topical, TID   collagenase, , Topical, Daily      Treatment plan-attending, consults   Medical impression   Active problems:       # new CMP with severe MR and mod- severe pHTN   - s/p RHC- acute combined HF   - Continue aspirin, Brilinta, high intensity statin   - cardiology is following   - -Started low-dose Entresto- now held due to RADHA   -Continue Toprol-XL   -Added hydralazine and nitrates   -Treated with catheterization shows component of acute combined heart failure.  It also shows pulmonary artery pressure elevated out of proportion to pulmonary capillary wedge pressure.   -Status post revascularization: Continue aspirin, Brilinta, high intensity statin   -Started low-dose Entresto- now held due to RADHA   -Continue Toprol-XL   -Added hydralazine and nitrates   -Treated with IV lasix gtt and transitioned to torsemide- now held due to RADHA   -Treat nocturnal hypoxia.  Order placed for home oxygen at nighttime. She qualifies for nocturnal oxygen due to significant desaturation noted on noturnal pulse oximetry. Patient will need outpatient polysomnogram and CPAP titration.       2. RADHA secondary to contrast load and diuresis / ACEI   - r/o obstructive uropathy   - hold diuretic / entresto    - supportive IVF       3. Hyperkalemia in setting of CKD III: resolved       4. Syncope secondary to Problem #1       5. Deconditioning due to medical conditions:   - Patient ordered a wheelchair due to the patient being unable to adequately ambulate long distances.    - Patient ordered a hospital bed due to the need of frequent repositioning of the body in ways not feasible with an ordinary bed due to peripheral vascular disease, and chronic lower extremity neuropathic pain.        Acute combined heart failure: improved   Group 2 and 3 pulmonary hypertension   T2DM with hyperglycemia   Class III Obesity   PVD   Chronic L foot wound   CAD   JESICA       Diet: DIET CARDIAC; Carb Control: 5 carbs/meal (approximate 2000 kcals/day); Low Sodium (2 GM); Low Potassium   Ppx: SQH   Full Code       Dispo: Discharge home held due to worsening kidney function. Per home oxygen company, the nocturnal pulse oximetry needs to be done within 48h of discharge to be approved.        Cardiology impression   ASSESSMENT       1. Dizziness/possible syncope. 2. Negative troponin,no acute EKG abnormality, doubt ACS.    3. Acute systolic CHF with deterioration of LVEF from 6/19 and worsening MR and pulmonary hypertension. 4. Status post PCI/stent of the ostial circumflex. 5. Elevated BNP. 6. Acute kidney injury on CKD.  Likely multifactorial including poor oral intake of fluids, contrast induced nephropathy and Entresto. 7. Extensive vascular disease   8. Tendency for hyperkalemia ,hence will be difficult to maximise ACEI/ARB /Entresto   9. Acute on chronic biventricular systolic heart failure.  Ischemic CM. 10. DM.            PLAN       Discontinue Entresto. Cautious hydration. Continue hydralazine and nitrates. Amiodarone discontinued. Hold torsemide. Discontinue IV NTG. Continue Brilinta.     Monitor renal function closely                Syncope - Care Day 4 (4/25/2020) by Kishore Terrell RN         Review Status Review Entered   Completed 4/28/2020 13:11       Criteria Review      Care Day: 4 Care Date: 4/25/2020 Level of Care: Intermediate Care    Guideline Day 2    Level Of Care    ( ) Intermediate care or telemetry to discharge    4/28/2020 1:11 PM EDT by José Dhillon      no d/c this day    ( ) Complete discharge planning    4/28/2020 1:11 PM EDT by José Dhillon      per CM    Clinical Status    (X) * Hemodynamic stability    4/28/2020 1:11 PM EDT by Tracie Phillips      temp 98.5, pulse 77, tele NSR, bp 119/41, pulse ox 100 % on 2l nc    (X) * No acute cardiac or neurologic events    (X) * Mental status at baseline    (X) * Dangerous arrhythmia absent    (X) * No further syncope    ( ) * No evidence of etiology requiring ongoing inpatient care or monitoring (eg, unstable cardiac rhythm or conduction defect)    4/28/2020 1:11 PM EDT by Erlin Phillips      cardiology starting on entresto this day- wants to watch k level    ( ) * Discharge plans and education understood    4/28/2020 1:11 PM EDT by Erlin Phillips      no d/c this day    Activity    (X) * Ambulatory    4/28/2020 1:11 PM EDT by Erlin Phillips      up with assist    Routes    (X) * Oral

## 2020-04-28 NOTE — PROGRESS NOTES
and oriented. No current complaints. Admits to sensation of dizziness then loc. No chest pain, sob, palpitations. Family noted when pt was supine following syncopal episode her lips and face turned blue and then she began to breathe again. Pt has no recollection of this and feels like her normal self/health. Pt remembers that room was dark,that she was groggy,that she had taken Vistaril the night before did not hit head,no palpitations or chest pressure. Has diarrhea, chronic, 5 times a day, very watery stool, no blood or mucus, no nausea or vomiting. Fragmented sleep,probable sleep apnea. CARDIAC HISTORY:  CAD post CABG June 2019 [facility], LIMA to LAD, PCI to left circumflex and right coronary artery (reported but no documentation). Ischemic cardiomyopathy with apical hypokinesia LVEF 50%. PVOD post right lower extremity revascularization,  and [facility] Dr Karen Gee, (details not available)  Carotid artery disease with ultrasound noted 50-69% bilateral stenoses May 2019. Syncope, presyncope  History of heart failure  Hypertension  Hyperlipidemia    This patient has had shortness of breath for several months. After initial work-up, she underwent coronary angiography and off-pump coronary artery bypass grafting of LIMA to LAD and distal left main stent and ostial circumflex stent. She says the shortness of breath is not improved. She is very sedentary, moving around sometimes with the help of a walker minimum weightbearing because of ulcers in her feet has had 1 fall prior to this admission on the morning of admission it sounds like she was very dizzy and fell I did not get the sense that this was syncope. She says she was seated in the wheelchair, it was in the middle of the night, and she was drinking orange juice because she was thirsty. Her troponin is negative EKG does not show any new abnormalities.   I reviewed her echocardiogram, there is 2+/3 mitral regurgitation no flail leaflet, global hypokinesis is what I see, did not appreciate lateral wall hypokinesis. There is moderate to severe pulmonary hypertension RV dilatation and biatrial enlargement. These findings of mitral regurgitation and pulmonary hypertension were not reported prior to May 2019. She does not complain of angina, but has shortness of breath, and she is a diabetic. She also has chronic kidney disease. She reportedly had sleep study was told she had sleep apnea, but has not been able to get CPAP therapy. My recommendations are: To proceed with right and left heart catheterization and possible intervention, risks benefits alternatives discussed via , risks discussed included but were not limited to MI, stroke, death, peripheral vascular compromise and allergic reaction to dye and kidney failure. Based on schedule procedure will be done tomorrow afternoon. She can be n.p.o. after breakfast.  If there is rapid decline in renal function, will have to postpone procedure. I have initiated hydralazine and nitrates for preload and afterload reduction. She is not orthostatic. VITALS     Vitals:    04/27/20 0509 04/27/20 0805 04/27/20 1433 04/28/20 0752   BP: (!) 123/58 (!) 144/54 (!) 130/51 (!) 140/84   Pulse: 74 92 79 75   Resp:  18 18 18   Temp:  97.3 °F (36.3 °C) 97.2 °F (36.2 °C) 97.2 °F (36.2 °C)   TempSrc:  Oral Oral Oral   SpO2:  98% 100% 100%   Weight: 231 lb 11.2 oz (105.1 kg)      Height:           Intake/Output Summary (Last 24 hours) at 4/28/2020 1023  Last data filed at 4/28/2020 5682  Gross per 24 hour   Intake 1680 ml   Output 2350 ml   Net -670 ml       Patient Vitals for the past 96 hrs (Last 3 readings):   Weight   04/27/20 0509 231 lb 11.2 oz (105.1 kg)       PHYSICAL EXAM   GENERAL:  Well developed, well nourished, in no acute distress. CHEST:  Symmetric and nontender. NEURO/PSYCH:  Alert and oriented times three with approppriate behavior and responses.   NECK:  Supple, no JVD, no bruit. LUNGS:  Decreased breath sounds base bilaterally, normal respiratory effort. HEART:  Rate and rhythm regular with no evident murmur, no gallop appreciated. There are no rubs, clicks or heaves. EXTREMITIES:  Warm with good color, no clubbing or cyanosis. There is trace edema noted. Groin is non-tender. PERIPHERAL VASCULAR:  Pulses present and equally palpable; 2+ throughout. DIAGNOSTIC RESULTS   EKG:  Normal sinus rhythm  Possible Left atrial enlargement  Nonspecific T wave abnormality  Abnormal ECG  When compared with ECG of 21-APR-2020 11:06,  premature ventricular complexes are no longer present  Minimal criteria for Anterior infarct are no longer present  T wave inversion no longer evident in Lateral leads    Telemetry: normal sinus . R and L heart cath [facility] 4/24/20: Moderate LV systolic dysfunction  Good correlation between LV end-diastolic pressure and mean pulmonary capillary wedge pressure, did not appreciate prominent V waves on the pulmonary capillary wedge pressure tracing  Significant difference between PA diastolic and mean pulmonary capillary wedge pressure  Moderate to severe pulmonary hypertension  No evidence of oxygen step up  Elevated filling pressures right greater than left. Critical disease ostial circumflex and ostial LAD  Totally occluded RCA fills by grade 2 collaterals from the left system and grade 1 antegrade collaterals. Underwent PCI and drug-eluting stent to the ostial circumflex artery with a 3.5 x 12 mm Xience Concha stent, which was postdilated with a 4.5 mm noncompliant balloon. Echo Summary:   Left ventricular size is mildly increased . Mild concentric left ventricular hypertrophy. Left ventricular ejection fraction is visually estimated at 40-45%. Pseudonormal filling pattern noted. Moderate hypokinesis of the lateral segment with mild to moderate   impairment of LV systolic function. Severe annular calcification.    Severe (3+) mitral regurgitation is present. Aortic valve leaflets are mildly thickened. The aortic valve area is 1.8 cm2 with a maximum gradient of 6 mmHg and a   mean gradient of 4 mmHg. --trivial to mild aortic stenosis   Trivial aortic regurgitation is noted. There is moderate ( 2 +) tricuspid regurgitation with estimated RVSP of 68   mm Hg.--moderately severe pulmonary hypertension   Moderate pulmonic regurgitation present. Moderately dilated left atrium. Intra atrial septum with to and fro movement noted, but no obvious PFO but   consider bubble study if clinically indicated   Mildly enlarged right ventricle cavity. Right ventricle global systolic function is mildly reduced . essentially four chamber enlargement with significant valvular pathology   as outlined above.       LAB DATA   BMP:  Recent Labs     04/26/20  0840 04/27/20  0613 04/28/20  0558   * 141 141   K 3.9 4.2 4.5  4.5   CL 91* 103 107   CO2 27 25 23   BUN 31* 30* 21   CREATININE 3.27* 1.94* 1.18*   LABGLOM 14.3* 26.1* 46.3*       CBC:  Recent Labs     04/28/20  0558   WBC 8.5   RBC 4.59   HGB 11.5*   HCT 35.2*   MCV 76.7*   MCH 25.0*   MCHC 32.6*   RDW 18.1*        Pro-BNP:  Lab Results   Component Value Date    PROBNP 4,202 04/21/2020       TSH:  Lab Results   Component Value Date    TSH 1.090 02/12/2020       Lipid Profile:  Lab Results   Component Value Date    TRIG 117 02/12/2020    HDL 54 02/12/2020    LDLCALC 83 02/12/2020       HgbA1C:  Lab Results   Component Value Date    LABA1C 8.0 03/05/2020     CMP:  Recent Labs     04/26/20  0840 04/27/20  0613 04/28/20  0558   * 141 141   K 3.9 4.2 4.5  4.5   CL 91* 103 107   CO2 27 25 23   BUN 31* 30* 21   CREATININE 3.27* 1.94* 1.18*   GLUCOSE 217* 171* 164*   CALCIUM 8.2* 8.6 8.6   PROT  --   --  6.4   LABALBU  --   --  3.1*   BILITOT  --   --  0.4   ALKPHOS  --   --  91   AST  --   --  18   ALT  --   --  17       RADIOLOGY     XR CHEST STANDARD (2 VW)   Final Result Impression:     1. Stable, enlarged cardiac mediastinal silhouette with underlying pulmonary edema   10. . Nonspecific bibasilar airspace disease, findings can be seen in infiltrate/pneumonia and/or atelectasis. Gio Campos US RETROPERITONEAL LIMITED   Final Result   NEGATIVE RENAL ULTRASOUND. US CAROTID ARTERY BILATERAL   Final Result      50-69% STENOSIS OF THE LEFT INTERNAL CAROTID ARTERY. LESS THAN 50% STENOSIS OF THE RIGHT INTERNAL CAROTID ARTERY IDENTIFIED ON TODAY'S EXAMINATION. ANTEGRADE FLOW OF THE BILATERAL VERTEBRAL ARTERIES. Validated velocity measurements with angiographic measurements, velocity criteria are extrapolated from diameter data as defined by the Society of Radiologist in [place] [address] Radiology 2003; 375;[id number]. CT Head WO Contrast   Final Result      No acute intracranial process. All CT scans at this facility use dose modulation, iterative reconstruction, and/or weight based dosing when appropriate to reduce radiation dose to as low as reasonably achievable. CT Cervical Spine WO Contrast   Final Result      No acute fracture or malalignment. All CT scans at this facility use dose modulation, iterative reconstruction, and/or weight based dosing when appropriate to reduce radiation dose to as low as reasonably achievable. CTA Chest W WO Contrast   Final Result      Respiratory motion artifact precludes evaluation for subsegmental pulmonary emboli of the right and left lower lobes at the bases. No large central pulmonary embolism identified. Cardiomegaly. Reflux of contrast into the hepatic veins suggests right heart failure. Diffuse nonspecific bilateral groundglass opacities most significant within the right and left lower lobes at the lung bases may be infectious/inflammatory in etiology or related to pulmonary edema.             All CT scans at this facility use dose modulation, iterative reconstruction, and/or weight based dosing when appropriate to reduce radiation dose to as low as reasonably achievable. XR CHEST PORTABLE   Final Result   NO ACUTE CHEST DISEASE. CURRENT MEDICATIONS       atorvastatin  80 mg Oral Daily    hydrALAZINE  50 mg Oral 3 times per day    sodium chloride  500 mL Intra-arterial Once    docusate sodium  100 mg Oral BID    aspirin  81 mg Oral Daily    ticagrelor  90 mg Oral BID    isosorbide dinitrate  20 mg Oral TID    sodium chloride flush  10 mL Intravenous 2 times per day    predniSONE  50 mg Oral Once    diphenhydrAMINE  50 mg Oral Once    metoprolol succinate  50 mg Oral Daily    heparin (porcine)  5,000 Units Subcutaneous 3 times per day    insulin lispro  0-12 Units Subcutaneous TID WC    insulin lispro  0-6 Units Subcutaneous Nightly    amitriptyline  25 mg Oral Nightly    vitamin B-12  1,000 mcg Oral Daily    folic acid  1 mg Oral Daily    gabapentin  400 mg Oral BID    gabapentin  800 mg Oral Nightly    insulin glargine  35 Units Subcutaneous Nightly    insulin lispro  10 Units Subcutaneous TID AC    levothyroxine  50 mcg Oral Daily    sertraline  200 mg Oral Daily    mupirocin   Topical TID    collagenase   Topical Daily       INTRAVENOUS MEDICATIONS       sodium chloride 100 mL/hr at 04/27/20 1133    dextrose           Please do not hesitate to call with questions.   Electronically signed by Ana Lilia Blackwell MD, [address] on 4/28/2020 at 10:23 AM

## 2020-04-28 NOTE — PROGRESS NOTES
PT.GIVEN D/C INSTRUCTIONS VIA  ID [id number]. PT.HAD NO FURTHER QUESTIONS. PT.WAITING FOR D/C RIDE. David FAXED INSTRUCTIONS.

## 2020-04-28 NOTE — DISCHARGE INSTR - DIET

## 2020-04-29 ENCOUNTER — CARE COORDINATION (OUTPATIENT)
Dept: CASE MANAGEMENT | Age: 65
End: 2020-04-29

## 2020-04-29 NOTE — CARE COORDINATION
4/29/2020: CHF booklet and zones sheet (English and Sammarinese) mailed to patient's home. Care Transition Nurse will provide follow up phone call for education.

## 2020-04-29 NOTE — PROGRESS NOTES
Physical Therapy  Facility/Department: [facility] MED SURG [id number]/[id number]  Physical Therapy Discharge      NAME: Toñito Akers    : 1955 (81 y.o.)  MRN: [id number]    Account: [de-identified]  Gender: female      Patient has been discharged from acute care hospital. DC patient from current PT program.      Electronically signed by Candance Greathouse, PT on 20 at 3:44 PM EDT 65.8

## 2020-04-30 ENCOUNTER — TELEPHONE (OUTPATIENT)
Dept: PULMONOLOGY | Age: 65
End: 2020-04-30

## 2020-05-06 ENCOUNTER — HOSPITAL ENCOUNTER (INPATIENT)
Age: 65
LOS: 5 days | Discharge: HOME HEALTH | DRG: 045 | End: 2020-05-12
Attending: INTERNAL MEDICINE | Admitting: INTERNAL MEDICINE
Payer: COMMERCIAL

## 2020-05-06 ENCOUNTER — APPOINTMENT (OUTPATIENT)
Dept: GENERAL RADIOLOGY | Age: 65
DRG: 045 | End: 2020-05-06
Payer: COMMERCIAL

## 2020-05-06 ENCOUNTER — APPOINTMENT (OUTPATIENT)
Dept: CT IMAGING | Age: 65
DRG: 045 | End: 2020-05-06
Payer: COMMERCIAL

## 2020-05-06 PROBLEM — R42 DIZZINESS: Status: ACTIVE | Noted: 2020-05-06

## 2020-05-06 LAB
ALBUMIN SERPL-MCNC: 4 G/DL (ref 3.5–4.6)
ALP BLD-CCNC: 102 U/L (ref 40–130)
ALT SERPL-CCNC: 26 U/L (ref 0–33)
ANION GAP SERPL CALCULATED.3IONS-SCNC: 16 MEQ/L (ref 9–15)
ANISOCYTOSIS: ABNORMAL
APTT: 29.8 SEC (ref 24.4–36.8)
AST SERPL-CCNC: 51 U/L (ref 0–35)
BANDED NEUTROPHILS RELATIVE PERCENT: 2 % (ref 5–11)
BASE EXCESS VENOUS: 1 (ref -3–3)
BASOPHILS ABSOLUTE: 0.2 K/UL (ref 0–0.2)
BASOPHILS RELATIVE PERCENT: 2 %
BETA-HYDROXYBUTYRATE: 3.5 MG/DL (ref 0.2–2.8)
BILIRUB SERPL-MCNC: 0.5 MG/DL (ref 0.2–0.7)
BUN BLDV-MCNC: 19 MG/DL (ref 8–23)
CALCIUM IONIZED: 0.8 MMOL/L (ref 1.12–1.32)
CALCIUM SERPL-MCNC: 9.6 MG/DL (ref 8.5–9.9)
CHLORIDE BLD-SCNC: 91 MEQ/L (ref 95–107)
CO2: 21 MEQ/L (ref 20–31)
CREAT SERPL-MCNC: 0.89 MG/DL (ref 0.5–0.9)
CREATININE URINE: 51.6 MG/DL
EKG ATRIAL RATE: 76 BPM
EKG P AXIS: 43 DEGREES
EKG P-R INTERVAL: 188 MS
EKG Q-T INTERVAL: 422 MS
EKG QRS DURATION: 110 MS
EKG QTC CALCULATION (BAZETT): 474 MS
EKG R AXIS: 34 DEGREES
EKG T AXIS: -5 DEGREES
EKG VENTRICULAR RATE: 76 BPM
EOSINOPHILS ABSOLUTE: 0.3 K/UL (ref 0–0.7)
EOSINOPHILS RELATIVE PERCENT: 3 %
GFR AFRICAN AMERICAN: 55
GFR AFRICAN AMERICAN: >60
GFR NON-AFRICAN AMERICAN: 45
GFR NON-AFRICAN AMERICAN: >60
GLOBULIN: 4.2 G/DL (ref 2.3–3.5)
GLUCOSE BLD-MCNC: 219 MG/DL (ref 60–115)
GLUCOSE BLD-MCNC: 284 MG/DL (ref 60–115)
GLUCOSE BLD-MCNC: 297 MG/DL (ref 70–99)
GLUCOSE BLD-MCNC: 328 MG/DL (ref 60–115)
GLUCOSE BLD-MCNC: 95 MG/DL (ref 60–115)
HCO3 VENOUS: 24.1 MMOL/L (ref 23–29)
HCT VFR BLD CALC: 39.7 % (ref 37–47)
HEMOGLOBIN: 13.5 G/DL (ref 12–16)
HEMOGLOBIN: 15 GM/DL (ref 12–16)
INR BLD: 1
LACTATE: 0.99 MMOL/L (ref 0.4–2)
LACTIC ACID: 1.5 MMOL/L (ref 0.5–2.2)
LYMPHOCYTES ABSOLUTE: 2 K/UL (ref 1–4.8)
LYMPHOCYTES RELATIVE PERCENT: 19 %
MAGNESIUM: 1.9 MG/DL (ref 1.7–2.4)
MCH RBC QN AUTO: 25.5 PG (ref 27–31.3)
MCHC RBC AUTO-ENTMCNC: 34 % (ref 33–37)
MCV RBC AUTO: 75 FL (ref 82–100)
MONOCYTES ABSOLUTE: 0.8 K/UL (ref 0.2–0.8)
MONOCYTES RELATIVE PERCENT: 8.4 %
NEUTROPHILS ABSOLUTE: 7.1 K/UL (ref 1.4–6.5)
NEUTROPHILS RELATIVE PERCENT: 66 %
O2 SAT, VEN: 71 %
OSMOLALITY URINE: 293 MOSM/KG (ref 300–900)
OSMOLALITY: 293 MOSM/KG (ref 280–303)
PCO2, VEN: 32.8 MM HG (ref 40–50)
PDW BLD-RTO: 17.4 % (ref 11.5–14.5)
PERFORMED ON: ABNORMAL
PERFORMED ON: NORMAL
PH VENOUS: 7.47 (ref 7.35–7.45)
PLATELET # BLD: 387 K/UL (ref 130–400)
PLATELET SLIDE REVIEW: NORMAL
PO2, VEN: 34 MM HG
POC CHLORIDE: 99 MEQ/L (ref 99–110)
POC CREATININE: 1.2 MG/DL (ref 0.6–1.2)
POC HEMATOCRIT: 44 % (ref 36–48)
POC POTASSIUM: >12 MEQ/L (ref 3.5–5.1)
POC SAMPLE TYPE: ABNORMAL
POC SODIUM: 125 MEQ/L (ref 136–145)
POTASSIUM SERPL-SCNC: 4.4 MEQ/L (ref 3.4–4.9)
POTASSIUM SERPL-SCNC: 4.7 MEQ/L (ref 3.4–4.9)
POTASSIUM SERPL-SCNC: 4.8 MEQ/L (ref 3.4–4.9)
PROTHROMBIN TIME: 13.7 SEC (ref 12.3–14.9)
RBC # BLD: 5.29 M/UL (ref 4.2–5.4)
REASON FOR REJECTION: NORMAL
REJECTED TEST: NORMAL
SMUDGE CELLS: 2.5
SODIUM BLD-SCNC: 128 MEQ/L (ref 135–144)
SODIUM URINE: 29 MEQ/L
TCO2 CALC VENOUS: 25 MMOL/L
TOTAL PROTEIN: 8.2 G/DL (ref 6.3–8)
TROPONIN: <0.01 NG/ML (ref 0–0.01)
TSH SERPL DL<=0.05 MIU/L-ACNC: 1.91 UIU/ML (ref 0.44–3.86)
VACUOLATED NEUTROPHILS: PRESENT
WBC # BLD: 10.4 K/UL (ref 4.8–10.8)

## 2020-05-06 PROCEDURE — 80053 COMPREHEN METABOLIC PANEL: CPT

## 2020-05-06 PROCEDURE — 82435 ASSAY OF BLOOD CHLORIDE: CPT

## 2020-05-06 PROCEDURE — 85014 HEMATOCRIT: CPT

## 2020-05-06 PROCEDURE — 96376 TX/PRO/DX INJ SAME DRUG ADON: CPT

## 2020-05-06 PROCEDURE — 82803 BLOOD GASES ANY COMBINATION: CPT

## 2020-05-06 PROCEDURE — 83935 ASSAY OF URINE OSMOLALITY: CPT

## 2020-05-06 PROCEDURE — 93010 ELECTROCARDIOGRAM REPORT: CPT | Performed by: INTERNAL MEDICINE

## 2020-05-06 PROCEDURE — 82565 ASSAY OF CREATININE: CPT

## 2020-05-06 PROCEDURE — 71045 X-RAY EXAM CHEST 1 VIEW: CPT

## 2020-05-06 PROCEDURE — 82010 KETONE BODYS QUAN: CPT

## 2020-05-06 PROCEDURE — 82330 ASSAY OF CALCIUM: CPT

## 2020-05-06 PROCEDURE — 85025 COMPLETE CBC W/AUTO DIFF WBC: CPT

## 2020-05-06 PROCEDURE — 84443 ASSAY THYROID STIM HORMONE: CPT

## 2020-05-06 PROCEDURE — 2580000003 HC RX 258: Performed by: INTERNAL MEDICINE

## 2020-05-06 PROCEDURE — 83930 ASSAY OF BLOOD OSMOLALITY: CPT

## 2020-05-06 PROCEDURE — 83605 ASSAY OF LACTIC ACID: CPT

## 2020-05-06 PROCEDURE — 99285 EMERGENCY DEPT VISIT HI MDM: CPT

## 2020-05-06 PROCEDURE — G0378 HOSPITAL OBSERVATION PER HR: HCPCS

## 2020-05-06 PROCEDURE — 36600 WITHDRAWAL OF ARTERIAL BLOOD: CPT

## 2020-05-06 PROCEDURE — 70450 CT HEAD/BRAIN W/O DYE: CPT

## 2020-05-06 PROCEDURE — 85730 THROMBOPLASTIN TIME PARTIAL: CPT

## 2020-05-06 PROCEDURE — 6360000002 HC RX W HCPCS: Performed by: INTERNAL MEDICINE

## 2020-05-06 PROCEDURE — 96374 THER/PROPH/DIAG INJ IV PUSH: CPT

## 2020-05-06 PROCEDURE — 6370000000 HC RX 637 (ALT 250 FOR IP): Performed by: STUDENT IN AN ORGANIZED HEALTH CARE EDUCATION/TRAINING PROGRAM

## 2020-05-06 PROCEDURE — 84295 ASSAY OF SERUM SODIUM: CPT

## 2020-05-06 PROCEDURE — 84484 ASSAY OF TROPONIN QUANT: CPT

## 2020-05-06 PROCEDURE — 6360000002 HC RX W HCPCS: Performed by: EMERGENCY MEDICINE

## 2020-05-06 PROCEDURE — 96361 HYDRATE IV INFUSION ADD-ON: CPT

## 2020-05-06 PROCEDURE — 2580000003 HC RX 258: Performed by: STUDENT IN AN ORGANIZED HEALTH CARE EDUCATION/TRAINING PROGRAM

## 2020-05-06 PROCEDURE — 93005 ELECTROCARDIOGRAM TRACING: CPT | Performed by: STUDENT IN AN ORGANIZED HEALTH CARE EDUCATION/TRAINING PROGRAM

## 2020-05-06 PROCEDURE — 82570 ASSAY OF URINE CREATININE: CPT

## 2020-05-06 PROCEDURE — 36415 COLL VENOUS BLD VENIPUNCTURE: CPT

## 2020-05-06 PROCEDURE — 96372 THER/PROPH/DIAG INJ SC/IM: CPT

## 2020-05-06 PROCEDURE — 6370000000 HC RX 637 (ALT 250 FOR IP): Performed by: EMERGENCY MEDICINE

## 2020-05-06 PROCEDURE — 6370000000 HC RX 637 (ALT 250 FOR IP): Performed by: INTERNAL MEDICINE

## 2020-05-06 PROCEDURE — 83735 ASSAY OF MAGNESIUM: CPT

## 2020-05-06 PROCEDURE — 96375 TX/PRO/DX INJ NEW DRUG ADDON: CPT

## 2020-05-06 PROCEDURE — 6360000002 HC RX W HCPCS: Performed by: STUDENT IN AN ORGANIZED HEALTH CARE EDUCATION/TRAINING PROGRAM

## 2020-05-06 PROCEDURE — 84132 ASSAY OF SERUM POTASSIUM: CPT

## 2020-05-06 PROCEDURE — 82948 REAGENT STRIP/BLOOD GLUCOSE: CPT

## 2020-05-06 PROCEDURE — 84300 ASSAY OF URINE SODIUM: CPT

## 2020-05-06 PROCEDURE — 85610 PROTHROMBIN TIME: CPT

## 2020-05-06 RX ORDER — SODIUM CHLORIDE 9 MG/ML
INJECTION, SOLUTION INTRAVENOUS CONTINUOUS
Status: DISCONTINUED | OUTPATIENT
Start: 2020-05-06 | End: 2020-05-10

## 2020-05-06 RX ORDER — SODIUM CHLORIDE 0.9 % (FLUSH) 0.9 %
10 SYRINGE (ML) INJECTION EVERY 12 HOURS SCHEDULED
Status: DISCONTINUED | OUTPATIENT
Start: 2020-05-06 | End: 2020-05-12 | Stop reason: HOSPADM

## 2020-05-06 RX ORDER — ACETAMINOPHEN 650 MG/1
650 SUPPOSITORY RECTAL EVERY 6 HOURS PRN
Status: DISCONTINUED | OUTPATIENT
Start: 2020-05-06 | End: 2020-05-12 | Stop reason: HOSPADM

## 2020-05-06 RX ORDER — IPRATROPIUM BROMIDE AND ALBUTEROL SULFATE 2.5; .5 MG/3ML; MG/3ML
1 SOLUTION RESPIRATORY (INHALATION) EVERY 4 HOURS PRN
Status: DISCONTINUED | OUTPATIENT
Start: 2020-05-06 | End: 2020-05-12 | Stop reason: HOSPADM

## 2020-05-06 RX ORDER — MECLIZINE HYDROCHLORIDE 25 MG/1
25 TABLET ORAL ONCE
Status: COMPLETED | OUTPATIENT
Start: 2020-05-06 | End: 2020-05-06

## 2020-05-06 RX ORDER — ATORVASTATIN CALCIUM 80 MG/1
80 TABLET, FILM COATED ORAL DAILY
Status: DISCONTINUED | OUTPATIENT
Start: 2020-05-06 | End: 2020-05-12 | Stop reason: HOSPADM

## 2020-05-06 RX ORDER — ONDANSETRON 2 MG/ML
4 INJECTION INTRAMUSCULAR; INTRAVENOUS ONCE
Status: COMPLETED | OUTPATIENT
Start: 2020-05-06 | End: 2020-05-06

## 2020-05-06 RX ORDER — ISOSORBIDE DINITRATE 20 MG/1
20 TABLET ORAL 3 TIMES DAILY
Status: DISCONTINUED | OUTPATIENT
Start: 2020-05-06 | End: 2020-05-12 | Stop reason: HOSPADM

## 2020-05-06 RX ORDER — HYDRALAZINE HYDROCHLORIDE 50 MG/1
50 TABLET, FILM COATED ORAL EVERY 8 HOURS SCHEDULED
Status: DISCONTINUED | OUTPATIENT
Start: 2020-05-06 | End: 2020-05-12 | Stop reason: HOSPADM

## 2020-05-06 RX ORDER — MECLIZINE HCL 12.5 MG/1
12.5 TABLET ORAL 3 TIMES DAILY PRN
Status: DISCONTINUED | OUTPATIENT
Start: 2020-05-06 | End: 2020-05-12 | Stop reason: HOSPADM

## 2020-05-06 RX ORDER — METOPROLOL SUCCINATE 50 MG/1
50 TABLET, EXTENDED RELEASE ORAL 2 TIMES DAILY
Status: DISCONTINUED | OUTPATIENT
Start: 2020-05-06 | End: 2020-05-12 | Stop reason: HOSPADM

## 2020-05-06 RX ORDER — GABAPENTIN 400 MG/1
400 CAPSULE ORAL 2 TIMES DAILY
Status: DISCONTINUED | OUTPATIENT
Start: 2020-05-06 | End: 2020-05-12 | Stop reason: HOSPADM

## 2020-05-06 RX ORDER — PROMETHAZINE HYDROCHLORIDE 25 MG/ML
6.25 INJECTION, SOLUTION INTRAMUSCULAR; INTRAVENOUS ONCE
Status: COMPLETED | OUTPATIENT
Start: 2020-05-06 | End: 2020-05-06

## 2020-05-06 RX ORDER — 0.9 % SODIUM CHLORIDE 0.9 %
1000 INTRAVENOUS SOLUTION INTRAVENOUS ONCE
Status: COMPLETED | OUTPATIENT
Start: 2020-05-06 | End: 2020-05-06

## 2020-05-06 RX ORDER — ONDANSETRON 2 MG/ML
4 INJECTION INTRAMUSCULAR; INTRAVENOUS EVERY 6 HOURS PRN
Status: DISCONTINUED | OUTPATIENT
Start: 2020-05-06 | End: 2020-05-12 | Stop reason: HOSPADM

## 2020-05-06 RX ORDER — POTASSIUM CHLORIDE 20 MEQ/1
40 TABLET, EXTENDED RELEASE ORAL PRN
Status: DISCONTINUED | OUTPATIENT
Start: 2020-05-06 | End: 2020-05-12 | Stop reason: HOSPADM

## 2020-05-06 RX ORDER — ASPIRIN 81 MG/1
81 TABLET, CHEWABLE ORAL DAILY
Status: DISCONTINUED | OUTPATIENT
Start: 2020-05-06 | End: 2020-05-12 | Stop reason: HOSPADM

## 2020-05-06 RX ORDER — INSULIN GLARGINE 100 [IU]/ML
35 INJECTION, SOLUTION SUBCUTANEOUS NIGHTLY
Status: DISCONTINUED | OUTPATIENT
Start: 2020-05-06 | End: 2020-05-11

## 2020-05-06 RX ORDER — PROMETHAZINE HYDROCHLORIDE 12.5 MG/1
12.5 TABLET ORAL EVERY 6 HOURS PRN
Status: DISCONTINUED | OUTPATIENT
Start: 2020-05-06 | End: 2020-05-12 | Stop reason: HOSPADM

## 2020-05-06 RX ORDER — ACETAMINOPHEN 500 MG
500 TABLET ORAL 3 TIMES DAILY
Status: DISCONTINUED | OUTPATIENT
Start: 2020-05-06 | End: 2020-05-12 | Stop reason: HOSPADM

## 2020-05-06 RX ORDER — LEVOTHYROXINE SODIUM 0.05 MG/1
50 TABLET ORAL DAILY
Status: DISCONTINUED | OUTPATIENT
Start: 2020-05-06 | End: 2020-05-12 | Stop reason: HOSPADM

## 2020-05-06 RX ORDER — DIPHENHYDRAMINE HYDROCHLORIDE 50 MG/ML
25 INJECTION INTRAMUSCULAR; INTRAVENOUS ONCE
Status: COMPLETED | OUTPATIENT
Start: 2020-05-06 | End: 2020-05-06

## 2020-05-06 RX ORDER — CHOLECALCIFEROL (VITAMIN D3) 125 MCG
1000 CAPSULE ORAL DAILY
Status: DISCONTINUED | OUTPATIENT
Start: 2020-05-06 | End: 2020-05-12 | Stop reason: HOSPADM

## 2020-05-06 RX ORDER — SODIUM CHLORIDE 0.9 % (FLUSH) 0.9 %
10 SYRINGE (ML) INJECTION PRN
Status: DISCONTINUED | OUTPATIENT
Start: 2020-05-06 | End: 2020-05-12 | Stop reason: HOSPADM

## 2020-05-06 RX ORDER — SERTRALINE HYDROCHLORIDE 100 MG/1
200 TABLET, FILM COATED ORAL DAILY
Status: DISCONTINUED | OUTPATIENT
Start: 2020-05-06 | End: 2020-05-12 | Stop reason: HOSPADM

## 2020-05-06 RX ORDER — DEXTROSE MONOHYDRATE 50 MG/ML
100 INJECTION, SOLUTION INTRAVENOUS PRN
Status: DISCONTINUED | OUTPATIENT
Start: 2020-05-06 | End: 2020-05-12 | Stop reason: HOSPADM

## 2020-05-06 RX ORDER — MECLIZINE HYDROCHLORIDE 25 MG/1
50 TABLET ORAL ONCE
Status: COMPLETED | OUTPATIENT
Start: 2020-05-06 | End: 2020-05-06

## 2020-05-06 RX ORDER — POLYETHYLENE GLYCOL 3350 17 G/17G
17 POWDER, FOR SOLUTION ORAL DAILY PRN
Status: DISCONTINUED | OUTPATIENT
Start: 2020-05-06 | End: 2020-05-12 | Stop reason: HOSPADM

## 2020-05-06 RX ORDER — FOLIC ACID 1 MG/1
1 TABLET ORAL DAILY
Status: DISCONTINUED | OUTPATIENT
Start: 2020-05-06 | End: 2020-05-12 | Stop reason: HOSPADM

## 2020-05-06 RX ORDER — POTASSIUM CHLORIDE 7.45 MG/ML
10 INJECTION INTRAVENOUS PRN
Status: DISCONTINUED | OUTPATIENT
Start: 2020-05-06 | End: 2020-05-12 | Stop reason: HOSPADM

## 2020-05-06 RX ORDER — 0.9 % SODIUM CHLORIDE 0.9 %
500 INTRAVENOUS SOLUTION INTRAVENOUS ONCE
Status: COMPLETED | OUTPATIENT
Start: 2020-05-06 | End: 2020-05-06

## 2020-05-06 RX ORDER — NICOTINE POLACRILEX 4 MG
15 LOZENGE BUCCAL PRN
Status: DISCONTINUED | OUTPATIENT
Start: 2020-05-06 | End: 2020-05-12 | Stop reason: HOSPADM

## 2020-05-06 RX ORDER — ACETAMINOPHEN 325 MG/1
650 TABLET ORAL EVERY 6 HOURS PRN
Status: DISCONTINUED | OUTPATIENT
Start: 2020-05-06 | End: 2020-05-12 | Stop reason: HOSPADM

## 2020-05-06 RX ORDER — DEXTROSE MONOHYDRATE 25 G/50ML
12.5 INJECTION, SOLUTION INTRAVENOUS PRN
Status: DISCONTINUED | OUTPATIENT
Start: 2020-05-06 | End: 2020-05-12 | Stop reason: HOSPADM

## 2020-05-06 RX ORDER — LORAZEPAM 2 MG/ML
1 INJECTION INTRAMUSCULAR ONCE
Status: COMPLETED | OUTPATIENT
Start: 2020-05-06 | End: 2020-05-06

## 2020-05-06 RX ADMIN — SODIUM CHLORIDE 500 ML: 9 INJECTION, SOLUTION INTRAVENOUS at 04:46

## 2020-05-06 RX ADMIN — PROMETHAZINE HYDROCHLORIDE 6.25 MG: 25 INJECTION, SOLUTION INTRAMUSCULAR; INTRAVENOUS at 05:06

## 2020-05-06 RX ADMIN — DIPHENHYDRAMINE HYDROCHLORIDE 25 MG: 50 INJECTION, SOLUTION INTRAMUSCULAR; INTRAVENOUS at 05:07

## 2020-05-06 RX ADMIN — Medication 10 ML: at 20:24

## 2020-05-06 RX ADMIN — SERTRALINE 200 MG: 100 TABLET, FILM COATED ORAL at 13:34

## 2020-05-06 RX ADMIN — ACETAMINOPHEN 500 MG: 500 TABLET ORAL at 20:23

## 2020-05-06 RX ADMIN — FOLIC ACID 1 MG: 1 TABLET ORAL at 13:34

## 2020-05-06 RX ADMIN — SODIUM CHLORIDE: 9 INJECTION, SOLUTION INTRAVENOUS at 13:33

## 2020-05-06 RX ADMIN — ONDANSETRON 4 MG: 2 INJECTION INTRAMUSCULAR; INTRAVENOUS at 03:07

## 2020-05-06 RX ADMIN — LORAZEPAM 1 MG: 2 INJECTION, SOLUTION INTRAMUSCULAR; INTRAVENOUS at 05:06

## 2020-05-06 RX ADMIN — CYANOCOBALAMIN TAB 500 MCG 1000 MCG: 500 TAB at 13:35

## 2020-05-06 RX ADMIN — MECLIZINE HYDROCHLORIDE 50 MG: 25 TABLET ORAL at 05:06

## 2020-05-06 RX ADMIN — ENOXAPARIN SODIUM 40 MG: 40 INJECTION SUBCUTANEOUS at 13:34

## 2020-05-06 RX ADMIN — TICAGRELOR 90 MG: 90 TABLET ORAL at 13:34

## 2020-05-06 RX ADMIN — MECLIZINE HYDROCHLORIDE 25 MG: 25 TABLET ORAL at 03:08

## 2020-05-06 RX ADMIN — METOPROLOL SUCCINATE 50 MG: 50 TABLET, EXTENDED RELEASE ORAL at 13:34

## 2020-05-06 RX ADMIN — TICAGRELOR 90 MG: 90 TABLET ORAL at 20:23

## 2020-05-06 RX ADMIN — ONDANSETRON 4 MG: 2 INJECTION INTRAMUSCULAR; INTRAVENOUS at 05:07

## 2020-05-06 RX ADMIN — HYDRALAZINE HYDROCHLORIDE 50 MG: 50 TABLET, FILM COATED ORAL at 13:35

## 2020-05-06 RX ADMIN — ISOSORBIDE DINITRATE 20 MG: 20 TABLET ORAL at 20:28

## 2020-05-06 RX ADMIN — ACETAMINOPHEN 500 MG: 500 TABLET ORAL at 13:34

## 2020-05-06 RX ADMIN — GABAPENTIN 400 MG: 400 CAPSULE ORAL at 13:35

## 2020-05-06 RX ADMIN — ASPIRIN 81 MG 81 MG: 81 TABLET ORAL at 13:34

## 2020-05-06 RX ADMIN — ATORVASTATIN CALCIUM 80 MG: 80 TABLET, FILM COATED ORAL at 13:34

## 2020-05-06 RX ADMIN — ONDANSETRON 4 MG: 2 INJECTION INTRAMUSCULAR; INTRAVENOUS at 03:32

## 2020-05-06 RX ADMIN — SODIUM CHLORIDE 1000 ML: 9 INJECTION, SOLUTION INTRAVENOUS at 03:07

## 2020-05-06 RX ADMIN — GABAPENTIN 400 MG: 400 CAPSULE ORAL at 20:24

## 2020-05-06 RX ADMIN — METOPROLOL SUCCINATE 50 MG: 50 TABLET, EXTENDED RELEASE ORAL at 20:23

## 2020-05-06 RX ADMIN — ISOSORBIDE DINITRATE 20 MG: 20 TABLET ORAL at 13:34

## 2020-05-06 RX ADMIN — ONDANSETRON 4 MG: 2 INJECTION INTRAMUSCULAR; INTRAVENOUS at 20:28

## 2020-05-06 ASSESSMENT — ENCOUNTER SYMPTOMS
COUGH: 0
DIARRHEA: 0
PHOTOPHOBIA: 0
ABDOMINAL PAIN: 0
CONSTIPATION: 0
WHEEZING: 0
SHORTNESS OF BREATH: 1
NAUSEA: 1
VOMITING: 1

## 2020-05-06 ASSESSMENT — PAIN SCALES - GENERAL
PAINLEVEL_OUTOF10: 0
PAINLEVEL_OUTOF10: 1
PAINLEVEL_OUTOF10: 0
PAINLEVEL_OUTOF10: 0

## 2020-05-06 NOTE — ED PROVIDER NOTES
Take 2 tablets by mouth daily    BLOOD GLUCOSE TEST STRIPS (FREESTYLE LITE) STRIP    1 each by In Vitro route daily As needed. CHOLECALCIFEROL (VITAMIN D) 2000 UNITS CAPS CAPSULE    Take 2,000 Units by mouth daily     CHOLECALCIFEROL (VITAMIN D3) 56584 UNITS CAPS    Take 1 capsule by mouth once a week Indications: weekly on Sun     CLOPIDOGREL (PLAVIX) 75 MG TABLET    Take 75 mg by mouth daily    CYANOCOBALAMIN (VITAMIN B-12) 1000 MCG EXTENDED RELEASE TABLET    Take 1,000 mcg by mouth daily    FOLIC ACID (FOLVITE) 1 MG TABLET    Take 1 mg by mouth daily    FREESTYLE LANCETS MISC    1 each by Does not apply route daily    FUROSEMIDE (LASIX) 40 MG TABLET    Take 40 mg by mouth daily Indications: M-W-F     GABAPENTIN (NEURONTIN) 400 MG CAPSULE    Take 400 mg by mouth 2 times daily.  AM and afternoon    GABAPENTIN (NEURONTIN) 400 MG CAPSULE    Take 800 mg by mouth nightly. 2 (400mg) caps at HS    HALOPERIDOL (HALDOL) 5 MG TABLET    Take 5 mg by mouth daily    HYDRALAZINE (APRESOLINE) 50 MG TABLET    Take 1 tablet by mouth every 8 hours    HYDROXYZINE (VISTARIL) 50 MG CAPSULE    Take 50 mg by mouth 3 times daily as needed for Itching     INSULIN GLARGINE (LANTUS) 100 UNIT/ML INJECTION VIAL    Inject 35 Units into the skin nightly    INSULIN LISPRO (HUMALOG) 100 UNIT/ML INJECTION VIAL    Inject 0-6 Units into the skin 3 times daily (with meals)    INSULIN LISPRO (HUMALOG) 100 UNIT/ML INJECTION VIAL    Inject 10 Units into the skin 3 times daily (before meals)     INSULIN PEN NEEDLE (NOVOFINE) 32G X 6 MM MISC    Qid    IRON POLYSACCH [id number] (NIFEREX-150 FORTE PO)    Take 1 tablet by mouth daily     ISOSORBIDE DINITRATE (ISORDIL) 20 MG TABLET    Take 1 tablet by mouth 3 times daily    LEVOTHYROXINE (SYNTHROID) 50 MCG TABLET    Take 50 mcg by mouth Daily    MECLIZINE (ANTIVERT) 25 MG TABLET    Take 25 mg by mouth three times daily    METOPROLOL SUCCINATE (TOPROL XL) 50 MG EXTENDED RELEASE TABLET    Take 1 tablet by Abnormal; Notable for the following components:    POC Glucose 284 (*)     All other components within normal limits   POCT VENOUS - Abnormal; Notable for the following components:    POC Sodium 125 (*)     POC Potassium >12.0 (*)     POC Glucose 328 (*)     GFR Non- 45 (*)     GFR African American 55 (*)     Calcium, Ion 0.80 (*)     pH, Lex 7.474 (*)     pCO2, Lex 32.8 (*)     All other components within normal limits   TROPONIN   LACTIC ACID, PLASMA   MAGNESIUM   APTT   PROTIME-INR   TSH WITHOUT REFLEX   URINE RT REFLEX TO CULTURE   POTASSIUM   POCT EPOC BLOOD GAS, LACTIC ACID, ICA       All other labs were within normal range or not returned as of this dictation. EMERGENCY DEPARTMENT COURSE and DIFFERENTIAL DIAGNOSIS/MDM:   Vitals:    Vitals:    05/06/20 0234 05/06/20 0350 05/06/20 0445   BP: (!) 128/102 (!) 160/79 (!) 168/79   Pulse: 86 77 76   Resp: 20 18 18   Temp: 97.7 °F (36.5 °C)     TempSrc: Oral     SpO2: 100% 99% 100%   Weight: 231 lb (104.8 kg)     Height: 5' 4\" (1.626 m)         MDM     Patient is a 60-year-old female who presents to the ED with constant dizziness nausea and vomiting. She is afebrile and hemodynamically stable. Pt With active vomiting in the ED. She was given 1 L IV normal saline, IV Zofran and p.o. Antivert in the ED. POC care glucose is 284. Beta hydroxybutyrate 3.5. BMP remarkable for sodium of 128 chloride of 91. Remainder of Labs are unremarkable. EKG shows NSR with HR 76, normal axis, normal intervals, no ST changes. No  Changes compared to 4/28/20. X-ray is negative for acute abnormality. CT of the head is negative for acute abnormality patient reassessed rates her dizziness is still a 10 out of 10 with 0 improvement. Continued vomiting and nausea. She was given additional Antivert, phenergen, zofran without relief. Given benadryl without relief per Dr. Masterson Dys  Concern is for posterior stroke as dizziness is intractable.   Suspect electrolyte abnormalities due to vomiting and dehydration. Patient will be admitted to the general medical floor in stable condition. Case discussed with Dr. Lynne Wei who accepts the patient to the floor. Case discussed with Dr. Aleja Chavez who evaluated the patient and agrees with plan. REASSESSMENT          CRITICAL CARE TIME   Total Critical Care time was 0 minutes, excluding separately reportable procedures. There was a high probability of clinically significant/life threatening deterioration in the patient's condition which required my urgent intervention. CONSULTS:  IP CONSULT TO NEUROLOGY    PROCEDURES:  Unless otherwise noted below, none         FINAL IMPRESSION      1. Nausea and vomiting, intractability of vomiting not specified, unspecified vomiting type    2. Dizziness    3. Hyponatremia          DISPOSITION/PLAN   DISPOSITION Admitted 05/06/2020 05:36:57 AM      PATIENT REFERRED TO:  Kv[facility] Benavidez  [address] [place] [address],[id number] [phone]            DISCHARGE MEDICATIONS:  New Prescriptions    No medications on file     Controlled Substances Monitoring:     No flowsheet data found.     (Please note that portions of this note were completed with a voice recognition program.  Efforts were made to edit the dictations but occasionally words are mis-transcribed.)    Kath Pettit PA-C (electronically signed)             Kath Pettit PA-C  [facility]20 [facility]  [facility]06[facility]20 [facility]  05/06/20 7916

## 2020-05-06 NOTE — ED NOTES
Bed: 10  Expected date:   Expected time:   Means of arrival:   Comments:  64F nausea/ vomiting; Russian speaking only     Claudell Boards, RN  05/06/20 9243

## 2020-05-06 NOTE — H&P
Give meclizine as needed for dizziness  Hold onto Haldol, patient is having facial dystonia likely secondary to Haldol  Rest of the home medications have been reviewed and resumed  Gentle hydration 50 mL/h of normal saline  DVT prophylaxis        Yunier Chávez MD

## 2020-05-06 NOTE — PROGRESS NOTES
[facility] EMERGENCY [facility] AT [place] Respiratory Therapy Evaluation   Current Order:  Duo q4 prn      Home Regimen: y      Ordering Physician: renita  Re-evaluation Date:  eval done     Diagnosis: sob      Patient Status: Stable / Unstable + Physician notified    The following MDI Criteria must be met in order to convert aerosol to MDI with spacer. If unable to meet, MDI will be converted to aerosol:  []  Patient able to demonstrate the ability to use MDI effectively  []  Patient alert and cooperative  []  Patient able to take deep breath with 5-10 second hold  []  Medication(s) available in this delivery method   []  Peak flow greater than or equal to 200 ml/min            Current Order Substituted To  (same drug, same frequency)   Aerosol to MDI [] Albuterol Sulfate 0.083% unit dose by aerosol Albuterol Sulfate MDI 2 puffs by inhalation with spacer    [] Levalbuterol 1.25 mg unit dose by aerosol Levalbuterol MDI 2 puffs by inhalation with spacer    [] Levalbuterol 0.63 mg unit dose by aerosol Levalbuterol MDI 2 puffs by inhalation with spacer    [] Ipratropium Bromide 0.02% unit dose by aerosol Ipratropium Bromide MDI 2 puffs by inhalation with spacer    [] Duoneb (Ipratropium + Albuterol) unit dose by aerosol Ipratropium MDI + Albuterol MDI 2 puffs by inhalation w/spacer   MDI to Aerosol [] Albuterol Sulfate MDI Albuterol Sulfate 0.083% unit dose by aerosol    [] Levalbuterol MDI 2 puffs by inhalation Levalbuterol 1.25 mg unit dose by aerosol    [] Ipratropium Bromide MDI by inhalation Ipratropium Bromide 0.02% unit dose by aerosol    [] Combivent (Ipratropium + Albuterol) MDI by inhalation Duoneb (Ipratropium + Albuterol) unit dose by aerosol   Treatment Assessment [Frequency/Schedule]:  Change frequency to: _______________no change__________________________________per Protocol, P&T, MEC      Points 0 1 2 3 4   Pulmonary Status  Non-Smoker  []   Smoking history   < 20 pack years  []   Smoking history  ?  20 pack years  []   Pulmonary

## 2020-05-06 NOTE — PROGRESS NOTES
Pt admitted after midnight with dizziness and weakness and CVA rule out. MRI to be done today. Neuro consulted. Pt seen and examined. No focal deficits noted. Minimal English, but pt just worried about what is happening. States that she has been vomiting but no vomiting since admission. Mild hyponatremia likely 2/2 dehydration and dizziness possibly related as well. Will obtain MRI to rule out stroke.  Pt has declined SNF in the past. PT/OT consulted    Mel Silva DO  Internal Medicine

## 2020-05-06 NOTE — ED NOTES
Patient resting in bed, A & O x4, skin warm, dry and pink, pulses palpable, msp's intact, 0 distress, resp even and unlabored. Call light in reach. Patient denies any needs at this time.       Nicolle French RN  05/06/20 2605

## 2020-05-06 NOTE — FLOWSHEET NOTE
6796: Patient arrives to 1W. Vitals are stable. Patient is weeping, emotional support provided.  Electronically signed by Trixie Panda RN on 5/6/2020 at 6:43 AM

## 2020-05-07 ENCOUNTER — APPOINTMENT (OUTPATIENT)
Dept: MRI IMAGING | Age: 65
DRG: 045 | End: 2020-05-07
Payer: COMMERCIAL

## 2020-05-07 PROBLEM — I63.9 ACUTE CVA (CEREBROVASCULAR ACCIDENT) (HCC): Status: ACTIVE | Noted: 2020-05-07

## 2020-05-07 LAB
ANION GAP SERPL CALCULATED.3IONS-SCNC: 10 MEQ/L (ref 9–15)
BUN BLDV-MCNC: 18 MG/DL (ref 8–23)
CALCIUM SERPL-MCNC: 8.9 MG/DL (ref 8.5–9.9)
CHLORIDE BLD-SCNC: 104 MEQ/L (ref 95–107)
CO2: 23 MEQ/L (ref 20–31)
CREAT SERPL-MCNC: 1.06 MG/DL (ref 0.5–0.9)
GFR AFRICAN AMERICAN: >60
GFR NON-AFRICAN AMERICAN: 52.4
GLUCOSE BLD-MCNC: 146 MG/DL (ref 70–99)
GLUCOSE BLD-MCNC: 147 MG/DL (ref 60–115)
GLUCOSE BLD-MCNC: 149 MG/DL (ref 60–115)
GLUCOSE BLD-MCNC: 159 MG/DL (ref 60–115)
GLUCOSE BLD-MCNC: 177 MG/DL (ref 60–115)
HCT VFR BLD CALC: 36.3 % (ref 37–47)
HEMOGLOBIN: 12.1 G/DL (ref 12–16)
MCH RBC QN AUTO: 25.4 PG (ref 27–31.3)
MCHC RBC AUTO-ENTMCNC: 33.2 % (ref 33–37)
MCV RBC AUTO: 76.4 FL (ref 82–100)
PDW BLD-RTO: 17.6 % (ref 11.5–14.5)
PERFORMED ON: ABNORMAL
PLATELET # BLD: 346 K/UL (ref 130–400)
POTASSIUM REFLEX MAGNESIUM: 4.6 MEQ/L (ref 3.4–4.9)
RBC # BLD: 4.75 M/UL (ref 4.2–5.4)
SODIUM BLD-SCNC: 137 MEQ/L (ref 135–144)
WBC # BLD: 8.4 K/UL (ref 4.8–10.8)

## 2020-05-07 PROCEDURE — 97166 OT EVAL MOD COMPLEX 45 MIN: CPT

## 2020-05-07 PROCEDURE — 6370000000 HC RX 637 (ALT 250 FOR IP): Performed by: INTERNAL MEDICINE

## 2020-05-07 PROCEDURE — 2060000000 HC ICU INTERMEDIATE R&B

## 2020-05-07 PROCEDURE — 96372 THER/PROPH/DIAG INJ SC/IM: CPT

## 2020-05-07 PROCEDURE — 80061 LIPID PANEL: CPT

## 2020-05-07 PROCEDURE — 36415 COLL VENOUS BLD VENIPUNCTURE: CPT

## 2020-05-07 PROCEDURE — 2580000003 HC RX 258: Performed by: INTERNAL MEDICINE

## 2020-05-07 PROCEDURE — 97162 PT EVAL MOD COMPLEX 30 MIN: CPT

## 2020-05-07 PROCEDURE — 80048 BASIC METABOLIC PNL TOTAL CA: CPT

## 2020-05-07 PROCEDURE — 6360000002 HC RX W HCPCS: Performed by: INTERNAL MEDICINE

## 2020-05-07 PROCEDURE — 96376 TX/PRO/DX INJ SAME DRUG ADON: CPT

## 2020-05-07 PROCEDURE — 83036 HEMOGLOBIN GLYCOSYLATED A1C: CPT

## 2020-05-07 PROCEDURE — 70551 MRI BRAIN STEM W/O DYE: CPT

## 2020-05-07 PROCEDURE — 85027 COMPLETE CBC AUTOMATED: CPT

## 2020-05-07 RX ORDER — LORAZEPAM 2 MG/ML
0.5 INJECTION INTRAMUSCULAR ONCE
Status: COMPLETED | OUTPATIENT
Start: 2020-05-07 | End: 2020-05-07

## 2020-05-07 RX ADMIN — METOPROLOL SUCCINATE 50 MG: 50 TABLET, EXTENDED RELEASE ORAL at 20:50

## 2020-05-07 RX ADMIN — SODIUM CHLORIDE: 9 INJECTION, SOLUTION INTRAVENOUS at 14:42

## 2020-05-07 RX ADMIN — ATORVASTATIN CALCIUM 80 MG: 80 TABLET, FILM COATED ORAL at 08:45

## 2020-05-07 RX ADMIN — MECLIZINE 12.5 MG: 12.5 TABLET ORAL at 08:45

## 2020-05-07 RX ADMIN — ACETAMINOPHEN 500 MG: 500 TABLET ORAL at 14:41

## 2020-05-07 RX ADMIN — LORAZEPAM 0.5 MG: 2 INJECTION, SOLUTION INTRAMUSCULAR; INTRAVENOUS at 13:43

## 2020-05-07 RX ADMIN — TICAGRELOR 90 MG: 90 TABLET ORAL at 08:45

## 2020-05-07 RX ADMIN — HYDRALAZINE HYDROCHLORIDE 50 MG: 50 TABLET, FILM COATED ORAL at 06:45

## 2020-05-07 RX ADMIN — FOLIC ACID 1 MG: 1 TABLET ORAL at 08:45

## 2020-05-07 RX ADMIN — ISOSORBIDE DINITRATE 20 MG: 20 TABLET ORAL at 14:41

## 2020-05-07 RX ADMIN — CYANOCOBALAMIN TAB 500 MCG 1000 MCG: 500 TAB at 08:45

## 2020-05-07 RX ADMIN — TICAGRELOR 90 MG: 90 TABLET ORAL at 20:50

## 2020-05-07 RX ADMIN — ACETAMINOPHEN 500 MG: 500 TABLET ORAL at 08:44

## 2020-05-07 RX ADMIN — ENOXAPARIN SODIUM 40 MG: 40 INJECTION SUBCUTANEOUS at 08:43

## 2020-05-07 RX ADMIN — ISOSORBIDE DINITRATE 20 MG: 20 TABLET ORAL at 20:50

## 2020-05-07 RX ADMIN — GABAPENTIN 400 MG: 400 CAPSULE ORAL at 20:50

## 2020-05-07 RX ADMIN — GABAPENTIN 400 MG: 400 CAPSULE ORAL at 08:44

## 2020-05-07 RX ADMIN — INSULIN GLARGINE 35 UNITS: 100 INJECTION, SOLUTION SUBCUTANEOUS at 20:50

## 2020-05-07 RX ADMIN — LEVOTHYROXINE SODIUM 50 MCG: 0.05 TABLET ORAL at 06:41

## 2020-05-07 RX ADMIN — ISOSORBIDE DINITRATE 20 MG: 20 TABLET ORAL at 08:45

## 2020-05-07 RX ADMIN — HYDRALAZINE HYDROCHLORIDE 50 MG: 50 TABLET, FILM COATED ORAL at 20:50

## 2020-05-07 RX ADMIN — HYDRALAZINE HYDROCHLORIDE 50 MG: 50 TABLET, FILM COATED ORAL at 14:41

## 2020-05-07 RX ADMIN — SERTRALINE 200 MG: 100 TABLET, FILM COATED ORAL at 08:45

## 2020-05-07 RX ADMIN — ASPIRIN 81 MG 81 MG: 81 TABLET ORAL at 08:44

## 2020-05-07 RX ADMIN — ACETAMINOPHEN 500 MG: 500 TABLET ORAL at 20:49

## 2020-05-07 RX ADMIN — METOPROLOL SUCCINATE 50 MG: 50 TABLET, EXTENDED RELEASE ORAL at 08:44

## 2020-05-07 ASSESSMENT — PAIN SCALES - GENERAL
PAINLEVEL_OUTOF10: 4
PAINLEVEL_OUTOF10: 0
PAINLEVEL_OUTOF10: 7
PAINLEVEL_OUTOF10: 0

## 2020-05-07 ASSESSMENT — PAIN DESCRIPTION - PAIN TYPE: TYPE: ACUTE PAIN

## 2020-05-07 ASSESSMENT — PAIN DESCRIPTION - LOCATION: LOCATION: FOOT

## 2020-05-07 NOTE — PROGRESS NOTES
Physical Therapy Med Surg Initial Assessment  Facility/Department: [facility]  [facility]       NAME: Fabiana Contreras  : 1955 (63 y.o.)  MRN: [id number]  CODE STATUS: Full Code    Date of Service: 2020    Patient Diagnosis(es): Dizziness [R42]   Chief Complaint   Patient presents with    Emesis     nausea and vomiting x1 day     Patient Active Problem List    Diagnosis Date Noted    Dizziness 2020    Acute on chronic combined systolic and diastolic congestive heart failure ([address] 2020    Nocturnal hypoxia 2020    [facility] injury) [facility] 2020    Severe mitral regurgitation 2020    Ischemic cardiomyopathy 2020    Pulmonary hypertension ([address] 2020    Syncope and collapse 2020    Cellulitis 2020    PAD (peripheral artery disease) ([facility] 2020    Chest pain 2020    Neuropathy due to secondary diabetes ([address] 2020    JESICA (obstructive sleep apnea)     Athscl native arteries of left leg w ulceration oth prt foot ([address] 2019    Diabetic ulcer of right foot with bone involvement without evidence of necrosis ([address] 2019    Rectal bleeding     Surgical wound dehiscence, initial encounter 2019    S/P amputation of lesser toe, right ([address] 2019    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     Skin infection 2019    Acute osteomyelitis ([address] 2019    Atherosclerotic PVD with intermittent claudication ([address] 2019    Non-pressure ulcer of toe ([address] 2019    Type 2 diabetes mellitus with hyperglycemia, with long-term current use of insulin ([address] 2019    HTN (hypertension) 2019    HLD (hyperlipidemia) 2019    Hypothyroid 2019    HF (heart failure) ([address] 2019    Severe major depression with psychotic features ([address] 2019        Past Medical History:   Diagnosis Date    Asthma     CAD (coronary artery disease)     CKD arrival    Transfers  Sit to Stand: Stand by assistance  Stand to sit: Stand by assistance    Ambulation  Ambulation?: Yes  Ambulation 1  Surface: level tile  Device: Rolling Walker  Assistance: Stand by assistance  Quality of Gait: antaligic  Gait Deviations: Slow Jennifer; Increased MARK; Decreased step length  Distance: 40ft  Comments: VC for safety with line management, increased time for performance d/t slow gait speed        Activity Tolerance  Activity Tolerance: Patient Tolerated treatment well      PT Education  PT Education: Goals;Plan of Care;PT Role    ASSESSMENT:   Body structures, Functions, Activity limitations: Decreased functional mobility ; Decreased balance;Decreased strength;Decreased posture  Decision Making: Medium Complexity  History: med  Exam: med  Clinical Presentation: med    Prognosis: Good  Barriers to Learning: none    DISCHARGE RECOMMENDATIONS:  Discharge Recommendations: Continue to assess pending progress, Patient would benefit from continued therapy after discharge    Assessment: Pt demonstrates the above deficits and decline in functional mobility status placing them at increased risk for falls. Pt c/p fear of falling and has experienced intermittent dizziness. Pt would benefit from physical therapy to address above deficits and allow for safe return home at highest level of function, decrease risk for falls, and improve QOL.     REQUIRES PT FOLLOW UP: Yes      PLAN OF CARE:  Plan  Times per week: 3-5  Current Treatment Recommendations: Strengthening, Balance Training, Transfer Training, Endurance Training, Gait Training, Stair training, ROM, Home Exercise Program, Pain Management, Positioning, Modalities, Functional Mobility Training, Neuromuscular Re-education, Equipment Evaluation, Education, & procurement, Patient/Caregiver Education & Training  Safety Devices  Type of devices: Call light within reach(pt sitting at EOB upon arrival, no falls precautions in place prior to

## 2020-05-07 NOTE — CONSULTS
PODIATRIC MEDICINE AND SURGERY  CONSULT HISTORY AND PHYSICAL    Consulting Service:  Internal Medicine  Requesting Provider: Dr. Nidia Jones regarding: Foot wounds  Staff Doctor:  Dr. Alvina Perry:  60 y.o. female with PMH significant for cardiomyopathy, CAD, CKD stage III, diabetes, hypertension, PVD with chronic ulcerations who is now admitted under observation for dizziness and vomiting likely secondary to dehydration. Podiatry was consulted for wound care recommendations for foot wounds. PLAN AND RECOMMENDATIONS[de-identified]  Patient's case to be discussed with staff, Dr. Cherelle Sanabria, who will provide final recommendations going forward  Left foot wound care: Betadine paint to all 3 locations daily. Cover with Band-Aid or Mepilex. Right foot wound care: Calcium alginate to third digit amp site, cover with 2 x 2 and Band-Aid. Weightbearing to tolerance to bilateral lower extremities  Offloading boots to be worn in bed at all times. Antibiotic coverage per primary or infectious disease. None currently  Pain management per primary team      HPI: This 60y.o. year old female was seen today for wound care recommendations for chronic diabetic wounds to bilateral feet. She has been followed by Dr. Chong Moe in the wound care center since 2019. Has been undergoing HBO therapy, however scheduled visits during the month of March were canceled due to hospitalization. Antibiotics completed in February. Most recent MRI of the left foot on 3/6/2020 with no osteomyelitis or soft tissue abscess. Arterial duplex ultrasound on 3/4/2020 revealed predominantly distal disease of the left lower extremity with no occlusion or flow-limiting stenosis. Currently afebrile and hemodynamically stable. No leukocytosis, hemoglobin stable at 12.1. Prior cultures to the left foot in 2019 with Pseudomonas and MRSA. Patient in significant pain during wound examination.   Denies constitutional symptoms, however endorses nausea In Vitro route daily As needed. 100 each 3    Alcohol Swabs (ALCOHOL PREP) 70 % PADS qid (Patient taking differently: Apply 1 each topically 4 times daily qid) 300 each 06    Insulin Pen Needle (NOVOFINE) 32G X 6 MM MISC Qid (Patient taking differently: 1 each 3 times daily Qid) 300 each 3       Allergies   Allergen Reactions    Ambien [Zolpidem Tartrate]     Capoten [Captopril]     Clioquinol     Cogentin [Benztropine]     Depakote [Divalproex Sodium]     Effexor Xr [Venlafaxine Hcl Er]     Geodon [Ziprasidone Hcl]     Lisinopril      Hyperkalemia: 4/21/20 potassium was 6.7    Lyrica [Pregabalin]     Navane [Thiothixene]     Pamelor [Nortriptyline Hcl]     Remeron [Mirtazapine]     Risperdal [Risperidone]     Trazodone And Nefazodone     Wellbutrin [Bupropion]        History reviewed. No pertinent family history.     Social History     Socioeconomic History    Marital status:      Spouse name: Not on file    Number of children: Not on file    Years of education: Not on file    Highest education level: Not on file   Occupational History    Not on file   Social Needs    Financial resource strain: Not on file    Food insecurity     Worry: Not on file     Inability: Not on file    Transportation needs     Medical: Not on file     Non-medical: Not on file   Tobacco Use    Smoking status: Never Smoker    Smokeless tobacco: Never Used   Substance and Sexual Activity    Alcohol use: Never     Frequency: Never    Drug use: Never    Sexual activity: Not on file   Lifestyle    Physical activity     Days per week: Not on file     Minutes per session: Not on file    Stress: Not on file   Relationships    Social connections     Talks on phone: Not on file     Gets together: Not on file     Attends Orthodox service: Not on file     Active member of club or organization: Not on file     Attends meetings of clubs or organizations: Not on file     Relationship status: Not on file    Intimate partner violence     Fear of current or ex partner: Not on file     Emotionally abused: Not on file     Physically abused: Not on file     Forced sexual activity: Not on file   Other Topics Concern    Not on file   Social History Narrative    Not on file       Review of Systems  CONSTITUTIONAL: No fevers, chills, diaphoresis  HEENT: No epistaxis, tinnitus  EYES: No diplopia, blurry vision. CARDIOVASCULAR: No chest pain, palpitations, lower extremity edema  PULM: No dyspnea, tachypnea, wheezing  GI: Positive nausea, vomiting. no constipation, diarrhea  : No dysuria, gross hematuria, or pyuria  NEURO: Increased dizziness, weakness. History of syncopal events. MSK: No new joint pain  PSY: No concerns regarding depression, anxiety  INTEGUMENTARY: See HPI    OBJECTIVE:  BP (!) 124/53   Pulse 59   Temp 97.3 °F (36.3 °C) (Oral)   Resp 18   Ht 5' 5\" (1.651 m)   Wt 221 lb 3 oz (100.3 kg)   SpO2 99%   BMI 36.81 kg/m²   Patient is alert and oriented to person, place, and time    Vascular:   Nonpalpable dorsalis pedis and posterior tibial pulses bilateral  Cap fill less than 3 seconds to bilateral digits  Hair growth present to digits bilaterally    Neurological:   Gross sensation intact to bilateral lower extremities   Sensation to light touch intact B/L    Musculoskeletal/Orthopaedic:   Structural Deformities: Amputation of the third digit right foot  Tenderness to palpation at sites of tissue loss. Gross motor function intact to bilateral lower extremities    Dermatological:   Skin appears diffusely xerotic. Diffuse hyperkeratosis plantarly  Images of ulcerations listed in open \"media\" tab. Ulceration #1:   Location: Right 3rd amp site  Approximately 0.1 cm x 0.1 cm x 0.3 cm in size. Granular base with no edema, erythema drainage or other acute signs of infection noted. No probe to bone no undermining. Ulceration #2:   Location: Left medial heel  Approximately 0.1 cm x 0.1 cm x 0.1 cm in size.  Granular                                                                 No acute osseous abnormality.        Mansoor Melendez DPM PGY-1  Podiatric Surgery Resident  Podiatry On Call Pager: [phone]  May 7, 2020  12:41 PM

## 2020-05-07 NOTE — PROGRESS NOTES
Department of Internal Medicine  General Internal Medicine  Attending Progress Note      SUBJECTIVE:  Pt seen and examined. Walking in room.  No new complaints    OBJECTIVE      Medications    Current Facility-Administered Medications: acetaminophen (TYLENOL) tablet 500 mg, 500 mg, Oral, TID  ipratropium-albuterol (DUONEB) nebulizer solution 1 ampule, 1 ampule, Inhalation, Q4H PRN  aspirin chewable tablet 81 mg, 81 mg, Oral, Daily  atorvastatin (LIPITOR) tablet 80 mg, 80 mg, Oral, Daily  vitamin B-12 (CYANOCOBALAMIN) tablet 1,000 mcg, 1,000 mcg, Oral, Daily  folic acid (FOLVITE) tablet 1 mg, 1 mg, Oral, Daily  gabapentin (NEURONTIN) capsule 400 mg, 400 mg, Oral, BID  hydrALAZINE (APRESOLINE) tablet 50 mg, 50 mg, Oral, 3 times per day  insulin glargine (LANTUS) injection vial 35 Units, 35 Units, Subcutaneous, Nightly  isosorbide dinitrate (ISORDIL) tablet 20 mg, 20 mg, Oral, TID  levothyroxine (SYNTHROID) tablet 50 mcg, 50 mcg, Oral, Daily  metoprolol succinate (TOPROL XL) extended release tablet 50 mg, 50 mg, Oral, BID  sertraline (ZOLOFT) tablet 200 mg, 200 mg, Oral, Daily  ticagrelor (BRILINTA) tablet 90 mg, 90 mg, Oral, BID  sodium chloride flush 0.9 % injection 10 mL, 10 mL, Intravenous, 2 times per day  sodium chloride flush 0.9 % injection 10 mL, 10 mL, Intravenous, PRN  acetaminophen (TYLENOL) tablet 650 mg, 650 mg, Oral, Q6H PRN **OR** acetaminophen (TYLENOL) suppository 650 mg, 650 mg, Rectal, Q6H PRN  polyethylene glycol (GLYCOLAX) packet 17 g, 17 g, Oral, Daily PRN  promethazine (PHENERGAN) tablet 12.5 mg, 12.5 mg, Oral, Q6H PRN **OR** ondansetron (ZOFRAN) injection 4 mg, 4 mg, Intravenous, Q6H PRN  potassium chloride (KLOR-CON M) extended release tablet 40 mEq, 40 mEq, Oral, PRN **OR** potassium bicarb-citric acid (EFFER-K) effervescent tablet 40 mEq, 40 mEq, Oral, PRN **OR** potassium chloride 10 mEq/100 mL IVPB (Peripheral Line), 10 mEq, Intravenous, PRN  enoxaparin (LOVENOX) injection 40 mg, 40 mg,

## 2020-05-07 NOTE — PROGRESS NOTES
Dependent = Pt. requires total assistance with task and is not able to actively participate with task completion     Functional Mobility:  Functional Mobility  Functional - Mobility Device: Rolling Walker  Activity: Other(from bed to door and back)  Assist Level: Supervision  Functional Mobility Comments: slowed movements  Transfers  Sit to stand: Supervision  Stand to sit: Supervision    Bed Mobility  Bed mobility  Supine to Sit: Unable to assess  Sit to Supine: Unable to assess  Comment: pt sitting EOB upon arrival    Seated and Standing Balance:  Balance  Sitting Balance: Supervision  Standing Balance: Supervision    Functional Endurance:  Activity Tolerance  Activity Tolerance: Patient Tolerated treatment well  Activity Tolerance: fair+    D/C Recommendations:  OT D/C RECOMMENDATIONS  REQUIRES OT FOLLOW UP: Yes    Equipment Recommendations:   none at this time    OT Education:   OT Education  OT Education: OT Role, Plan of Care  Barriers to Learning: Indonesian speaking    OT Follow Up:  OT D/C RECOMMENDATIONS  REQUIRES OT FOLLOW UP: Yes       Assessment/Discharge Disposition:  Assessment: Pt is a 59 y/o female from home with spouse who presents to [facility] with the above deficts. Pt would benefit from OT services to maximize independence and safety during ADLs and IADLs.   Performance deficits / Impairments: Decreased functional mobility , Decreased high-level IADLs, Decreased balance, Decreased ADL status, Decreased strength, Decreased sensation, Decreased fine motor control  Prognosis: Good  Discharge Recommendations: Continue to assess pending progress  Decision Making: Medium Complexity  History: Pt medical history with 4 complexities  Exam: Pt with 7 perf deficits   Assistance / Modification: Min A    Six Click Score   How much help for putting on and taking off regular lower body clothing?: A Little  How much help for Bathing?: A Little  How much help for Toileting?: A Little  How much help for putting on and taking off regular upper body clothing?: A Little  How much help for taking care of personal grooming?: A Little  How much help for eating meals?: None  AM-PAC Inpatient Daily Activity Raw Score: 19  AM-PAC Inpatient ADL T-Scale Score : 40.22  ADL Inpatient CMS 0-100% Score: 42.8    Plan:  Plan  Times per week: 1-3x  Plan weeks: length of acute stay  Current Treatment Recommendations: Strengthening, Balance Training, Neuromuscular Re-education, Patient/Caregiver Education & Training, Self-Care / ADL, Functional Mobility Training    Goals:   Patient will:    - Be Indpendent in UB ADLs   - Be SBA in LB ADLs  - Be Independent in ADL transfers without LOB  - Be Independent in toileting tasks  - Improve B hand fine motor coordination to [facility] in order to manage clothing fasteners/self-care containers in a timely manner  - Improve B UE strength and endurance to [facility] in order to participate in self-care activities as projected. - Access appropriate D/C site with as few architectural barriers as possible.     Patient Goal: Patient goals : to get back home when safe     Discussed and agreed upon: Yes Comments:     Therapy Time:   OT Individual Minutes  Time In: 1703  Time Out: 1159  Minutes: 21    Eval: 21 minutes     Electronically signed by:    REAGAN Ceja  5/7/2020, 12:18 PM

## 2020-05-08 PROBLEM — F41.9 ANXIETY: Status: ACTIVE | Noted: 2019-03-24

## 2020-05-08 PROBLEM — F20.9 SCHIZOPHRENIA, UNSPECIFIED (HCC): Status: ACTIVE | Noted: 2019-03-24

## 2020-05-08 PROBLEM — R26.9 GAIT ABNORMALITY: Status: ACTIVE | Noted: 2020-05-08

## 2020-05-08 PROBLEM — M86.9 OSTEOMYELITIS OF RIGHT FOOT (HCC): Status: ACTIVE | Noted: 2019-06-28

## 2020-05-08 PROBLEM — G25.9 EXTRAPYRAMIDAL SYNDROME: Status: ACTIVE | Noted: 2019-06-28

## 2020-05-08 PROBLEM — E78.00 PURE HYPERCHOLESTEROLEMIA: Status: ACTIVE | Noted: 2019-03-24

## 2020-05-08 PROBLEM — I95.2 HYPOTENSION DUE TO DRUGS: Status: ACTIVE | Noted: 2019-06-28

## 2020-05-08 PROBLEM — K29.70 GASTRITIS, UNSPECIFIED, WITHOUT BLEEDING: Status: ACTIVE | Noted: 2019-03-24

## 2020-05-08 PROBLEM — I25.10 CAD IN NATIVE ARTERY: Status: ACTIVE | Noted: 2019-06-28

## 2020-05-08 PROBLEM — I96 GANGRENE OF TOE OF LEFT FOOT (HCC): Status: ACTIVE | Noted: 2020-05-08

## 2020-05-08 LAB
CHOLESTEROL, TOTAL: 136 MG/DL (ref 0–199)
GLUCOSE BLD-MCNC: 100 MG/DL (ref 60–115)
GLUCOSE BLD-MCNC: 133 MG/DL (ref 60–115)
GLUCOSE BLD-MCNC: 137 MG/DL (ref 60–115)
GLUCOSE BLD-MCNC: 204 MG/DL (ref 60–115)
HBA1C MFR BLD: 7.7 % (ref 4.8–5.9)
HDLC SERPL-MCNC: 47 MG/DL (ref 40–59)
LDL CHOLESTEROL CALCULATED: 66 MG/DL (ref 0–129)
PERFORMED ON: ABNORMAL
PERFORMED ON: NORMAL
TRIGL SERPL-MCNC: 113 MG/DL (ref 0–150)

## 2020-05-08 PROCEDURE — 97116 GAIT TRAINING THERAPY: CPT

## 2020-05-08 PROCEDURE — 99222 1ST HOSP IP/OBS MODERATE 55: CPT | Performed by: PHYSICAL MEDICINE & REHABILITATION

## 2020-05-08 PROCEDURE — APPSS45 APP SPLIT SHARED TIME 31-45 MINUTES: Performed by: NURSE PRACTITIONER

## 2020-05-08 PROCEDURE — 6370000000 HC RX 637 (ALT 250 FOR IP): Performed by: INTERNAL MEDICINE

## 2020-05-08 PROCEDURE — 2060000000 HC ICU INTERMEDIATE R&B

## 2020-05-08 PROCEDURE — 2580000003 HC RX 258: Performed by: INTERNAL MEDICINE

## 2020-05-08 PROCEDURE — 6360000002 HC RX W HCPCS: Performed by: INTERNAL MEDICINE

## 2020-05-08 PROCEDURE — 99254 IP/OBS CNSLTJ NEW/EST MOD 60: CPT | Performed by: PSYCHIATRY & NEUROLOGY

## 2020-05-08 RX ADMIN — Medication 10 ML: at 08:15

## 2020-05-08 RX ADMIN — ENOXAPARIN SODIUM 40 MG: 40 INJECTION SUBCUTANEOUS at 08:15

## 2020-05-08 RX ADMIN — METOPROLOL SUCCINATE 50 MG: 50 TABLET, EXTENDED RELEASE ORAL at 08:15

## 2020-05-08 RX ADMIN — ATORVASTATIN CALCIUM 80 MG: 80 TABLET, FILM COATED ORAL at 21:20

## 2020-05-08 RX ADMIN — INSULIN GLARGINE 35 UNITS: 100 INJECTION, SOLUTION SUBCUTANEOUS at 21:28

## 2020-05-08 RX ADMIN — HYDRALAZINE HYDROCHLORIDE 50 MG: 50 TABLET, FILM COATED ORAL at 15:18

## 2020-05-08 RX ADMIN — HYDRALAZINE HYDROCHLORIDE 50 MG: 50 TABLET, FILM COATED ORAL at 06:03

## 2020-05-08 RX ADMIN — ACETAMINOPHEN 500 MG: 500 TABLET ORAL at 08:15

## 2020-05-08 RX ADMIN — GABAPENTIN 400 MG: 400 CAPSULE ORAL at 21:21

## 2020-05-08 RX ADMIN — ACETAMINOPHEN 500 MG: 500 TABLET ORAL at 21:21

## 2020-05-08 RX ADMIN — TICAGRELOR 90 MG: 90 TABLET ORAL at 08:15

## 2020-05-08 RX ADMIN — ACETAMINOPHEN 500 MG: 500 TABLET ORAL at 15:18

## 2020-05-08 RX ADMIN — FOLIC ACID 1 MG: 1 TABLET ORAL at 08:15

## 2020-05-08 RX ADMIN — METOPROLOL SUCCINATE 50 MG: 50 TABLET, EXTENDED RELEASE ORAL at 21:21

## 2020-05-08 RX ADMIN — ISOSORBIDE DINITRATE 20 MG: 20 TABLET ORAL at 15:18

## 2020-05-08 RX ADMIN — HYDRALAZINE HYDROCHLORIDE 50 MG: 50 TABLET, FILM COATED ORAL at 21:21

## 2020-05-08 RX ADMIN — LEVOTHYROXINE SODIUM 50 MCG: 0.05 TABLET ORAL at 06:03

## 2020-05-08 RX ADMIN — GABAPENTIN 400 MG: 400 CAPSULE ORAL at 08:15

## 2020-05-08 RX ADMIN — SERTRALINE 200 MG: 100 TABLET, FILM COATED ORAL at 08:14

## 2020-05-08 RX ADMIN — ISOSORBIDE DINITRATE 20 MG: 20 TABLET ORAL at 08:15

## 2020-05-08 RX ADMIN — CYANOCOBALAMIN TAB 500 MCG 1000 MCG: 500 TAB at 08:15

## 2020-05-08 RX ADMIN — ASPIRIN 81 MG 81 MG: 81 TABLET ORAL at 08:15

## 2020-05-08 RX ADMIN — TICAGRELOR 90 MG: 90 TABLET ORAL at 21:21

## 2020-05-08 RX ADMIN — ISOSORBIDE DINITRATE 20 MG: 20 TABLET ORAL at 21:21

## 2020-05-08 ASSESSMENT — ENCOUNTER SYMPTOMS
STRIDOR: 0
SORE THROAT: 0
CHEST TIGHTNESS: 0
NAUSEA: 0
BACK PAIN: 1
ABDOMINAL PAIN: 0
COUGH: 0
SHORTNESS OF BREATH: 0
WHEEZING: 0
DIARRHEA: 0
EYE PAIN: 0
PHOTOPHOBIA: 0
VOMITING: 0
TROUBLE SWALLOWING: 0
BLOOD IN STOOL: 0
EYE REDNESS: 0
CONSTIPATION: 1
SHORTNESS OF BREATH: 1
COLOR CHANGE: 0

## 2020-05-08 ASSESSMENT — PAIN SCALES - GENERAL
PAINLEVEL_OUTOF10: 0

## 2020-05-08 NOTE — CONSULTS
[facility] Neurology Consult  Note  Name: Paris Hernandez  Age: 60 y.o. Gender: female  CodeStatus: Full Code  Allergies: Ambien [Zolpidem Tartrate]  Capoten [Captopril]  Clioquinol  Cogentin [Benztropine]  Depakote [Divalproex Sodium]  Effexor Xr [Venlafaxine Hcl Er]  Geodon [Ziprasidone Hcl]  Lisinopril  Lyrica [Pregabalin]  Navane [Thiothixene]  Pamelor [Nortriptyline Hcl]  Remeron [Mirtazapine]  Risperdal [Risperidone]  Trazodone And Nefazodone  Wellbutrin [Bupropion]    Chief Complaint:Emesis (nausea and vomiting x1 day)    Primary Care Provider: Tray Bowling  InpatientTreatment Team: Treatment Team: Attending Provider: Rakesh Le DO; Utilization Reviewer: Deysi Maddox RN; Consulting Physician: Emilia Tapia MD; Registered Nurse: Tung Hdz RN; : Corean Cowden, [employer]; Patient Care Tech: Weiju Human  Admission Date: 5/6/2020      HPI   Pt seen and examined for neurology consult. 61-year-old female with past medical history of thyroid disease, PVD, PAD with toe amputation and chronic wounds status post revascularization, hypertension, hyperlipidemia, diabetes mellitus, colitis, CKD stage III, CAD status post coronary artery bypass graft in 2019 and PCI with stent currently on aspirin and Brilinta, asthma who presented [facility] EM[facility]Y [facility] AT [place] emergency department on 5/6/2020 with complaints of gradual onset dizziness with associated nausea and vomiting. No reported syncope or loss of consciousness. She did have recent hospital admission on 4/21/2020 for dizziness with syncope and was found to have new cardiomyopathy with severe mitral regurgitation and moderate to severe pulmonary hypertension. EF noted to be 45% at that time. Patient denies any recent fall with head trauma. No recent significant illness, fever, chills, chest pain/pressure, palpitations. No dysarthria, dysphasia, one-sided weakness. Currently alert and oriented x3, no acute distress, cooperative. Patient  speaking. Video  used. No focal focal deficits noted. No seizure activity. Dizziness resolved. Labs/diagnostics on admission: Glucose 284, TSH 1.910, museum 1.9, lactic acid 1.5, [place] less than 0.010, WBCs 10.4, hemoglobin 13.5, hematocrit 39.7, platelets 165, sodium 128, potassium 4.8, glucose 297, BUN 19, creatinine 0.89, calcium 9.6, ALT 26, AST 51, alkaline phosphatase 102, CT head no acute intracranial process, CT head no acute intracranial process, chest x-ray mild cardiomegaly, MRI showed small focus of right frontal lobe acute infarct          Vitals:    05/08/20 0705   BP: (!) 121/104   Pulse: 59   Resp: 18   Temp: 97.5 °F (36.4 °C)   SpO2: 98%   Breast Cancer-related family history is not on file.   Social History     Socioeconomic History    Marital status:      Spouse name: Not on file    Number of children: Not on file    Years of education: Not on file    Highest education level: Not on file   Occupational History    Not on file   Social Needs    Financial resource strain: Not on file    Food insecurity     Worry: Not on file     Inability: Not on file    Transportation needs     Medical: Not on file     Non-medical: Not on file   Tobacco Use    Smoking status: Never Smoker    Smokeless tobacco: Never Used   Substance and Sexual Activity    Alcohol use: Never     Frequency: Never    Drug use: Never    Sexual activity: Not on file   Lifestyle    Physical activity     Days per week: Not on file     Minutes per session: Not on file    Stress: Not on file   Relationships    Social connections     Talks on phone: Not on file     Gets together: Not on file     Attends Mormonism service: Not on file     Active member of club or organization: Not on file     Attends meetings of clubs or organizations: Not on file     Relationship status: Not on file    Intimate partner violence     Fear of current or ex partner: Not on file     Emotionally abused: Not ipratropium-albuterol, sodium chloride flush, acetaminophen **OR** acetaminophen, polyethylene glycol, promethazine **OR** ondansetron, potassium chloride **OR** potassium alternative oral replacement **OR** potassium chloride, meclizine, glucose, dextrose, glucagon (rDNA), dextrose    Labs:   Recent Labs     20  0315 20  0455 20  0551   WBC 10.4  --  8.4   HGB 13.5 15.0 12.1   HCT 39.7  --  36.3*     --  346     Recent Labs     20  0315 20  0455 20  1027 20  1426 20  0551   *  --   --   --  137   K 4.8  --  4.7 4.4 4.6   CL 91*  --   --   --  104   CO2 21  --   --   --  23   BUN 19  --   --   --  18   CREATININE 0.89 1.2  --   --  1.06*   CALCIUM 9.6  --   --   --  8.9     Recent Labs     20  0315   AST 51*   ALT 26   BILITOT 0.5   ALKPHOS 102     Recent Labs     20  0315   INR 1.0     Recent Labs     20  0315   TROPONINI <0.010       Urinalysis:   Lab Results   Component Value Date    NITRU Negative 2020    BLOODU Negative 2020    SPECGRAV 1.015 2020    GLUCOSEU 250 2020       Radiology:   Most recent    EEG No procedure found. MRI of Brain No results found for this or any previous visit. Results for orders placed during the hospital encounter of 20   MRI BRAIN WO CONTRAST    Addendum Addendum: The covering physician has been notified. Catrachita Roger MD 2020  3:11 PM          Narrative Patient MRN: [id number]    : 1955    Order Date: 2020 1:00 PM.     Exam: MRI BRAIN WO CONTRAST    Number of Views: 681     Indication: 79-year-old female Presenting to emergency department with dizziness since afternoon. Patient reports intractable nausea, vomiting. Patient reporting loss of balance. History of cardiomyopathy, coronary artery disease, diabetes and   hypertension    Comparison: Head CT 2020. CTA had 2020.     Contrast dosage: None    Findings: Focal area of restricted Multiple contiguous axial images were obtained of the brain from the skull base through the vertex. Multiplanar reformats were obtained. FINDINGS:    Prominence of the sulci and ventricles compatible with mild generalized parenchymal volume loss. Gray-white matter differentiation is preserved. No acute hemorrhage or abnormal extra-axial fluid collection. No mass effect or midline shift. The visualized paranasal sinuses and mastoid air cells are clear. Calvarium is intact. Impression No acute intracranial process. All CT scans at this facility use dose modulation, iterative reconstruction, and/or weight based dosing when appropriate to reduce radiation dose to as low as reasonably achievable. No results found for this or any previous visit. No results found for this or any previous visit. Carotid duplex: No results found for this or any previous visit. No results found for this or any previous visit.   Results for orders placed during the hospital encounter of 04/21/20   US CAROTID ARTERY BILATERAL    Narrative BILATERAL DUPLEX CAROTID ULTRASOUND    CLINICAL INFORMATION:  SYNCOPE    COMPARISON: Ultrasound carotid artery from May 26, 2019    FINDINGS:  The bilateral carotid duplex ultrasound study demonstrates the following:                                                                        RIGHT            LEFT      Proximal common carotid artery           85 cm/s            110 cm/s    Mid common carotid artery                   66 cm/s            57 cm/s    Distal common carotid artery                78 cm/s           86 cm/s  Proximal internal carotid artery             104 cm/s            151 cm/s  Mid internal carotid artery                      88 cm/s           140 cm/s  Distal internal carotid artery                  118 cm/s             80 cm/s  External carotid artery                            81 cm/s           112 cm/s      ICA/CCA ratio                                         1.8

## 2020-05-08 NOTE — PROGRESS NOTES
Department of Internal Medicine  General Internal Medicine  Attending Progress Note      SUBJECTIVE:  Pt seen and examined. Lying in bed.  No new complaints    OBJECTIVE      Medications    Current Facility-Administered Medications: acetaminophen (TYLENOL) tablet 500 mg, 500 mg, Oral, TID  ipratropium-albuterol (DUONEB) nebulizer solution 1 ampule, 1 ampule, Inhalation, Q4H PRN  aspirin chewable tablet 81 mg, 81 mg, Oral, Daily  atorvastatin (LIPITOR) tablet 80 mg, 80 mg, Oral, Daily  vitamin B-12 (CYANOCOBALAMIN) tablet 1,000 mcg, 1,000 mcg, Oral, Daily  folic acid (FOLVITE) tablet 1 mg, 1 mg, Oral, Daily  gabapentin (NEURONTIN) capsule 400 mg, 400 mg, Oral, BID  hydrALAZINE (APRESOLINE) tablet 50 mg, 50 mg, Oral, 3 times per day  insulin glargine (LANTUS) injection vial 35 Units, 35 Units, Subcutaneous, Nightly  isosorbide dinitrate (ISORDIL) tablet 20 mg, 20 mg, Oral, TID  levothyroxine (SYNTHROID) tablet 50 mcg, 50 mcg, Oral, Daily  metoprolol succinate (TOPROL XL) extended release tablet 50 mg, 50 mg, Oral, BID  sertraline (ZOLOFT) tablet 200 mg, 200 mg, Oral, Daily  ticagrelor (BRILINTA) tablet 90 mg, 90 mg, Oral, BID  sodium chloride flush 0.9 % injection 10 mL, 10 mL, Intravenous, 2 times per day  sodium chloride flush 0.9 % injection 10 mL, 10 mL, Intravenous, PRN  acetaminophen (TYLENOL) tablet 650 mg, 650 mg, Oral, Q6H PRN **OR** acetaminophen (TYLENOL) suppository 650 mg, 650 mg, Rectal, Q6H PRN  polyethylene glycol (GLYCOLAX) packet 17 g, 17 g, Oral, Daily PRN  promethazine (PHENERGAN) tablet 12.5 mg, 12.5 mg, Oral, Q6H PRN **OR** ondansetron (ZOFRAN) injection 4 mg, 4 mg, Intravenous, Q6H PRN  potassium chloride (KLOR-CON M) extended release tablet 40 mEq, 40 mEq, Oral, PRN **OR** potassium bicarb-citric acid (EFFER-K) effervescent tablet 40 mEq, 40 mEq, Oral, PRN **OR** potassium chloride 10 mEq/100 mL IVPB (Peripheral Line), 10 mEq, Intravenous, PRN  enoxaparin (LOVENOX) injection 40 mg, 40 mg, frontal lobe CVA  - cont DAPT, statin, lovenox  - neuro consulted  - PT/OT    # IDDM  - cont lantus, ISS    # HTN, HLD, CAD  - BP controlled  - continue home medications- hold for SBP<100 or HR<60  - will continue to monitor     # Hypothyroid  - cont home meds    # Depression/Anxiety  - cont home meds    Disposition: Pt admitted with vague symptoms of dizziness and weakness. No focal deficits noted. MRI shows new R frontal lobe CVA. PT/OT. Continuing DAPT, statin. Neurology consulted- has not seen patient since admission on 5/6. Will consulte rehab for evaluation although pt has declined SNF/rehab in the past. [facility] if pt refuses rehab.       Nyasia Rivero, DO  Internal Medicine

## 2020-05-08 NOTE — PROGRESS NOTES
PODIATRIC MEDICINE AND SURGERY  CONSULT PROGRESS NOTE    INTERVAL EVENTS:  Patient resting at bedside. Newly diagnosed right frontal lobe CVA on MRI. Vital signs: afebrile and hemodynamically stable  Current dressings including calcium alginate to the right third amp site, Betadine to the left foot. No offloading to bilateral feet during rounds today  Patient still in moderate pain during dressing changes, even with light touch. ASSESSMENT:  60 y.o. female with PMH significant for cardiomyopathy, CAD, CKD stage III, diabetes, hypertension, PVD with chronic ulcerations who is now admitted under observation for dizziness and vomiting likely secondary to dehydration. Podiatry was consulted for wound care recommendations for foot wounds. PLAN AND RECOMMENDATIONS[de-identified]  Patient's seen and discussed with staff, Dr. Zahraa Kilpatrick, who will provide final recommendations going forward  Left foot wound care: Betadine paint to all 3 locations daily. Cover with Band-Aid or Mepilex. Right foot wound care: Calcium alginate to third digit amp site, cover with Mepilex. Weightbearing to tolerance to bilateral lower extremities  Offloading boots to be worn in bed at all times. Antibiotic coverage per primary or infectious disease. Pain management per primary team   Follow-up with Dr. Xander Peguero in the outpatient setting within 7 to 10 days of discharge. Podiatry will sign off. If any new or worsening issues or concerns for acute infection please reconsult podiatry. Pertinent HPI: From consult note on 5/7/2020:   \"This 60y.o. year old female was seen today for wound care recommendations for chronic diabetic wounds to bilateral feet. She has been followed by Dr. Xander Peguero in the wound care center since 2019. Has been undergoing HBO therapy, however scheduled visits during the month of March were canceled due to hospitalization. Antibiotics completed in February.   Most recent MRI of the left foot on 3/6/2020 with no Component Value Date    WBC 8.4 05/07/2020    HGB 12.1 05/07/2020    HCT 36.3 (L) 05/07/2020    MCV 76.4 (L) 05/07/2020     05/07/2020     Lab Results   Component Value Date     05/07/2020    K 4.6 05/07/2020     05/07/2020    CO2 23 05/07/2020    BUN 18 05/07/2020    CREATININE 1.06 05/07/2020    GLUCOSE 146 05/07/2020    GLUCOSE 279 01/06/2020    CALCIUM 8.9 05/07/2020      Lab Results   Component Value Date    LABALBU 4.0 05/06/2020     Lab Results   Component Value Date    SEDRATE 34 (H) 04/28/2020     Lab Results   Component Value Date    CRP 11.7 (H) 05/25/2019     Lab Results   Component Value Date    LABA1C 8.0 (H) 03/05/2020       MICROBIOLOGY:   2019:  Wound Culture:  positive for Pseudomonas and MRSA  Blood Culture: None    NEW IMAGING:   None        Charley Marcelino DPM PGY-1  Podiatric Surgery Resident  Podiatry On Call Pager: [phone]  May 8, 2020  8:17 AM

## 2020-05-08 NOTE — DISCHARGE INSTR - COC
Continuity of Care Form    Patient Name: Toñito Akers   :  1955  MRN:  [id number]    Admit date:  2020  Discharge date:  20      Code Status Order: Full Code   Advance Directives:   [facility] Documentation     Date/Time Healthcare Directive Type of Healthcare Directive Copy in [facility] [address] Agent's Name Healthcare Agent's Phone Number    20 0945  No, patient does not have an advance directive for healthcare treatment -- -- -- -- --          Admitting Physician:  Katlyn Mcgraw MD  PCP: Dago Griggs    Discharging Nurse: [facility] Unit/Room#: U155/K402-90  Discharging Unit Phone Number: [phone]      Emergency Contact:   Extended Emergency Contact Information  Primary Emergency Contact: [address] [place] [address] Phone: [phone]  Relation: Spouse    Past Surgical History:  Past Surgical History:   Procedure Laterality Date    CARDIAC SURGERY       SECTION      COLONOSCOPY N/A 2019    COLONOSCOPY DIAGNOSTIC performed by Mario Stone MD at [facility]  2019    unknown vessels    HYSTERECTOMY      TOE AMPUTATION Right     3rd toe       Immunization History: There is no immunization history on file for this patient.     Active Problems:  Patient Active Problem List   Diagnosis Code    Severe major depression with psychotic features ([address] F32.3    Type 2 diabetes mellitus with hyperglycemia, with long-term current use of insulin (HCC) E11.65, Z79.4    HTN (hypertension) I10    HLD (hyperlipidemia) E78.5    Hypothyroid E03.9    HF (heart failure) ([address] I50.9    Non-pressure ulcer of toe ([address] L97.509    Atherosclerotic PVD with intermittent claudication ([address] I70.219    Acute osteomyelitis ([address] M86.10    Skin infection L08.9    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 A49.8    Surgical wound dehiscence, initial encounter

## 2020-05-08 NOTE — CONSULTS
Physical Medicine & Rehabilitation  Consult Note      Admitting Physician: Sophy Garcia DO    Primary Care Provider: Harsha Francis     Reason for Consult:  Asses rehab needs,promote physical and mental function, and decrease likelihood of re-admit to the hospital after discharge. History of Present Illness:    Aroldo Cabrera is a 60 y.o. female admitted to [facility] on 2020. She had presented to the emergency room with worsening dizziness. She has a lengthy medical history of many cardiopulmonary diseases as well as schizophrenia. Is on multiple medications. She was diagnosed with a right frontal lobe CVA after an MRI showed an acute subacute right posterior frontal lobe CVA-patient was maintained on statins and Lovenox neurology was consulted. She was also found to have numerous wounds on her bilateral lower extremities left greater than right and seen by podiatry. I reviewed notes from her recent hospitalization in which she had a cardiopulmonary disease and she was hospitalized and seen by pulmonology and cardiology. Neurologic Problem   The patient's primary symptoms include weakness. Associated symptoms include back pain, fatigue and shortness of breath. Pertinent negatives include no abdominal pain, chest pain, diaphoresis, dizziness, fever, headaches, nausea, neck pain, palpitations or vomiting. Fatigue   Associated symptoms include fatigue, myalgias and weakness. Pertinent negatives include no abdominal pain, chest pain, chills, congestion, coughing, diaphoresis, fever, headaches, nausea, neck pain, rash, sore throat or vomiting.            Their inpatient work up has included:    Imaging:    Imaging and other studies reviewed and discussed with patient and staff    Xr Chest Standard   2020  Patient MRN: [id number] : 1955 Age:  60 years Gender: Female Order Date: 2020 7:00 AM. Exam: XR CHEST (2 VW) Number of Views: 2 Indication: significant within the right and left lower lobes at the lung bases may be infectious/inflammatory in etiology or related to pulmonary edema. All CT scans at this facility use dose modulation, iterative reconstruction, and/or weight based dosing when appropriate to reduce radiation dose to as low as reasonably achievable. Ct Cervical Spine : 4/21/2020  EXAM: CT CERVICAL SPINE WO CONTRAST COMPARISON: None available REASON FOR EXAMINATION:  Neck pain after a fall. Trauma. TECHNIQUE: Multiple contiguous axial CT images of the cervical spine were obtained. Multiplanar reformats performed. FINDINGS:  No acute fracture or malalignment. Atlanto-occipital articulation is maintained. Atlantodental interval is preserved. Cervical spinal vertebral body heights are preserved. Mild multilevel degenerative changes of the cervical spine. Straightening of the  cervical lordosis. There is no prevertebral soft tissue swelling. Please see CT chest report regarding lung apex findings. No acute fracture or malalignment. Xr Chest   5/6/2020  Portable chest radiograph History: Dizziness. Shortness of breath. Technique: AP portable view of the chest obtained. Comparison: Chest x-rays from April 28, 2020 Findings: Postsurgical changes of CABG. Heart size is mildly enlarged. Saundra Ehrhardt No pneumothorax, pleural effusion, or consolidation. Bones of the thorax appear intact. No radiographic evidence of acute intrathoracic process. Mild cardiomegaly. Xr Chest  4/21/2020  EXAMINATION: XR CHEST PORTABLE CLINICAL HISTORY: History of fall. Altered mental status. COMPARISONS: 2/11/2020 FINDINGS: Cardiac size is borderline. Pulmonary vascularity is normal. The lungs are clear. There is no evidence of adenopathy. There are sternotomy wires and mediastinal clips. Bones are unremarkable. There is a small calcification adjacent to the left humeral greater tuberosity suggesting calcific tendinopathy. NO ACUTE CHEST DISEASE.      Mri Brain nondiagnostic motion artifact. Us Retroperitoneal  4/26/2020  EXAMINATION: US RETROPERITONEAL LIMITED CLINICAL HISTORY: ACUTE RENAL INSUFFICIENCY FINDINGS: Right kidney measures 10.2 x 5.8 x 5.6 cm. Left kidney measures 10.0 x 4.5 x 5.1 cm. No pelvocaliectasis, perinephric fluid, or calculi bilaterally. Color flow without anomaly bilaterally. No cystic and no solid masses bilaterally. NEGATIVE RENAL ULTRASOUND. Us Carotid Artery  4/21/2020  BILATERAL DUPLEX CAROTID ULTRASOUND CLINICAL INFORMATION:  SYNCOPE COMPARISON: Ultrasound carotid artery from May 26, 2019 FINDINGS:  The bilateral carotid duplex ultrasound study demonstrates the following:                                                                  RIGHT            LEFT Proximal common carotid artery           85 cm/s            110 cm/s  Mid common carotid artery                   66 cm/s            57 cm/s  Distal common carotid artery                78 cm/s           86 cm/s Proximal internal carotid artery             104 cm/s            151 cm/s Mid internal carotid artery                      88 cm/s           140 cm/s Distal internal carotid artery                  118 cm/s             80 cm/s External carotid artery                            81 cm/s           112 cm/s    ICA/CCA ratio                                         1.8                2.7   Vertebral arteries antegrade flow         Yes                     Yes      50-69% STENOSIS OF THE LEFT INTERNAL CAROTID ARTERY. LESS THAN 50% STENOSIS OF THE RIGHT INTERNAL CAROTID ARTERY IDENTIFIED ON TODAY'S EXAMINATION. ANTEGRADE FLOW OF THE BILATERAL VERTEBRAL ARTERIES.                Labs:     labs reviewed and discussed with patient and staff    Lab Results   Component Value Date    POCGLU 133 05/08/2020    POCGLU 100 05/08/2020    POCGLU 149 05/07/2020    POCGLU 159 05/07/2020    POCGLU 177 05/07/2020     Lab Results   Component Value Date     05/07/2020    K 4.6 05/07/2020    CL 104 05/07/2020    CO2 23 05/07/2020    BUN 18 05/07/2020    CREATININE 1.06 05/07/2020    CALCIUM 8.9 05/07/2020    LABALBU 4.0 05/06/2020    LABALBU <7.0 01/06/2020    BILITOT 0.5 05/06/2020    ALKPHOS 102 05/06/2020    AST 51 05/06/2020    ALT 26 05/06/2020     Lab Results   Component Value Date    WBC 8.4 05/07/2020    RBC 4.75 05/07/2020    HGB 12.1 05/07/2020    HCT 36.3 05/07/2020    MCV 76.4 05/07/2020    MCH 25.4 05/07/2020    MCHC 33.2 05/07/2020    RDW 17.6 05/07/2020     05/07/2020     No results found for: VITD25  Lab Results   Component Value Date    COLORU Yellow 04/21/2020    NITRU Negative 04/21/2020    GLUCOSEU 250 04/21/2020    KETUA Negative 04/21/2020    UROBILINOGEN 0.2 04/21/2020    BILIRUBINUR Negative 04/21/2020     Lab Results   Component Value Date    PROTIME 13.7 05/06/2020     Lab Results   Component Value Date    INR 1.0 05/06/2020         I discussed results with patient. Current Rehabilitation Assessments:    Rehabilitation:  Physical therapy: FIMS:  BedMobility:      Transfers: Sit to Stand: Stand by assistance  Stand to sit: Stand by assistance, Ambulation 1  Surface: level tile  Device: Rolling Walker  Assistance: Stand by assistance  Quality of Gait: antaligic  Gait Deviations: Slow Jennifer, Decreased step length  Distance: 40ft  Comments: VC for safty, WBOS and ER BLE,      FIMS: ,  , Assessment: Pt limited with english lang. Well motivated but fatigues with exercises. Occupational therapy: FIMS:   ,  , Assessment: Pt is a 61 y/o female from home with spouse who presents to [facility] with the above deficts. Pt would benefit from OT services to maximize independence and safety during ADLs and IADLs.       ADL  Feeding: Independent (05/07/20 1205)  Grooming: Contact guard assistance (05/07/20 1205)  UE Bathing: Supervision (05/07/20 1205)  LE Bathing: Minimal assistance (05/07/20 1205)  UE Dressing: Contact guard assistance (05/07/20 1205)  LE Dressing: Minimal assistance (20 120)  Toileting: Contact guard assistance (20 120)  Additional Comments: Simulated all ADLs as above (20)  OCCUPATIONAL THERAPY  Hand Dominance: Right  ADL  Feeding: Independent (20)  Grooming: Contact guard assistance (20 120)  UE Bathing: Supervision (20)  LE Bathing: Minimal assistance (20 120)  UE Dressing: Contact guard assistance (20 120)  LE Dressing: Minimal assistance (20)  Toileting: Contact guard assistance (20 120)  Additional Comments: Simulated all ADLs as above (20)  Toilet Transfers  Toilet Transfer: Unable to assess (20)  Toilet Transfers Comments: anticipate supervision (20)     Shower Transfers  Shower Transfers: Not tested (20)      LTG:                 Speech therapy: FIMS:          Past Medical History:          Diagnosis Date    Asthma     CAD (coronary artery disease)     CKD (chronic kidney disease) stage 3, GFR 30-59 ml/min ([facility])     Colitis     Diabetes mellitus [facility] [address]     Hyperlipidemia     Hypertension     PAD (peripheral artery disease) ([facility])     Prolonged emergence from general anesthesia     PVD (peripheral vascular disease) ([facility] [address]     Thyroid disease          PastSurgical History:          Procedure Laterality Date    CARDIAC SURGERY       SECTION      COLONOSCOPY N/A 2019    COLONOSCOPY DIAGNOSTIC performed by Caden Bowers MD at [address] [place] [address] GRAFT  2019    unknown vessels    HYSTERECTOMY      TOE AMPUTATION Right     3rd toe         Allergies:      Allergies   Allergen Reactions    Ambien [Zolpidem Tartrate]     Capoten [Captopril]     Clioquinol     Cogentin [Benztropine]     Depakote [Divalproex Sodium]     Effexor Xr [Venlafaxine Hcl Er]     Geodon [Ziprasidone Hcl]     Lisinopril      Hyperkalemia: 20 potassium was 6.7    Lyrica [Pregabalin]     Navane [Thiothixene] [address]: One level    Home Access: Elevator    Bathroom Shower/Tub: Tub/Shower unit(Simultaneous filing. User may not have seen previous data.)    Bathroom Equipment: Grab bars in shower, Shower chair    Home Equipment: Rolling walker, [address] bed    ADL Assistance: Needs assistance    Homemaking Assistance: Needs assistance    Homemaking Responsibilities: No    Ambulation Assistance: Independent    Transfer Assistance: Independent    Active : No    Additional Comments: Pt wears orthopedic shoes at baseline    The patient states she is current with [facility](RN per the ). The patient had a walker, but obtained a hospital bed, wheel chair, BSC and O2 last admission (from [address]). pharmacy is [address] [place] [address],[address] [address]., [facility] [place]. Transportation is provided by [employer]	[place] () per the patient          Family History:     History reviewed. No pertinent family history. Review of Systems:    Review of Systems   Unable to perform ROS: Dementia   Constitutional: Positive for activity change and fatigue. Negative for chills, diaphoresis and fever. HENT: Negative for congestion, ear discharge, ear pain, hearing loss, nosebleeds, sore throat and tinnitus. Eyes: Negative for photophobia, pain and redness. Respiratory: Positive for shortness of breath. Negative for cough, wheezing and stridor. Shortness of breath on exertion   Cardiovascular: Negative for chest pain, palpitations and leg swelling. Gastrointestinal: Positive for constipation. Negative for abdominal pain, blood in stool, diarrhea, nausea and vomiting. Endocrine: Negative for polydipsia. Genitourinary: Negative for dysuria, flank pain, frequency, hematuria and urgency. Musculoskeletal: Positive for back pain, gait problem and myalgias. Negative for neck pain. Skin: Negative for rash.    Allergic/Immunologic: Positive for

## 2020-05-09 LAB
GLUCOSE BLD-MCNC: 150 MG/DL (ref 60–115)
GLUCOSE BLD-MCNC: 171 MG/DL (ref 60–115)
GLUCOSE BLD-MCNC: 213 MG/DL (ref 60–115)
GLUCOSE BLD-MCNC: 72 MG/DL (ref 60–115)
PERFORMED ON: ABNORMAL
PERFORMED ON: NORMAL

## 2020-05-09 PROCEDURE — 6360000002 HC RX W HCPCS: Performed by: INTERNAL MEDICINE

## 2020-05-09 PROCEDURE — 2580000003 HC RX 258: Performed by: INTERNAL MEDICINE

## 2020-05-09 PROCEDURE — 6370000000 HC RX 637 (ALT 250 FOR IP): Performed by: INTERNAL MEDICINE

## 2020-05-09 PROCEDURE — 2060000000 HC ICU INTERMEDIATE R&B

## 2020-05-09 PROCEDURE — 99233 SBSQ HOSP IP/OBS HIGH 50: CPT | Performed by: PSYCHIATRY & NEUROLOGY

## 2020-05-09 RX ADMIN — GABAPENTIN 400 MG: 400 CAPSULE ORAL at 09:04

## 2020-05-09 RX ADMIN — HYDRALAZINE HYDROCHLORIDE 50 MG: 50 TABLET, FILM COATED ORAL at 06:24

## 2020-05-09 RX ADMIN — ASPIRIN 81 MG 81 MG: 81 TABLET ORAL at 09:04

## 2020-05-09 RX ADMIN — METOPROLOL SUCCINATE 50 MG: 50 TABLET, EXTENDED RELEASE ORAL at 21:25

## 2020-05-09 RX ADMIN — METOPROLOL SUCCINATE 50 MG: 50 TABLET, EXTENDED RELEASE ORAL at 09:04

## 2020-05-09 RX ADMIN — ACETAMINOPHEN 500 MG: 500 TABLET ORAL at 09:04

## 2020-05-09 RX ADMIN — HYDRALAZINE HYDROCHLORIDE 50 MG: 50 TABLET, FILM COATED ORAL at 21:25

## 2020-05-09 RX ADMIN — INSULIN GLARGINE 35 UNITS: 100 INJECTION, SOLUTION SUBCUTANEOUS at 21:26

## 2020-05-09 RX ADMIN — TICAGRELOR 90 MG: 90 TABLET ORAL at 21:25

## 2020-05-09 RX ADMIN — SERTRALINE 200 MG: 100 TABLET, FILM COATED ORAL at 09:04

## 2020-05-09 RX ADMIN — ISOSORBIDE DINITRATE 20 MG: 20 TABLET ORAL at 09:04

## 2020-05-09 RX ADMIN — ENOXAPARIN SODIUM 40 MG: 40 INJECTION SUBCUTANEOUS at 09:04

## 2020-05-09 RX ADMIN — ISOSORBIDE DINITRATE 20 MG: 20 TABLET ORAL at 14:47

## 2020-05-09 RX ADMIN — Medication 10 ML: at 09:04

## 2020-05-09 RX ADMIN — GABAPENTIN 400 MG: 400 CAPSULE ORAL at 21:24

## 2020-05-09 RX ADMIN — FOLIC ACID 1 MG: 1 TABLET ORAL at 09:04

## 2020-05-09 RX ADMIN — ACETAMINOPHEN 500 MG: 500 TABLET ORAL at 14:47

## 2020-05-09 RX ADMIN — TICAGRELOR 90 MG: 90 TABLET ORAL at 09:04

## 2020-05-09 RX ADMIN — ACETAMINOPHEN 500 MG: 500 TABLET ORAL at 21:25

## 2020-05-09 RX ADMIN — LEVOTHYROXINE SODIUM 50 MCG: 0.05 TABLET ORAL at 06:24

## 2020-05-09 RX ADMIN — Medication 10 ML: at 21:25

## 2020-05-09 RX ADMIN — CYANOCOBALAMIN TAB 500 MCG 1000 MCG: 500 TAB at 09:04

## 2020-05-09 RX ADMIN — ISOSORBIDE DINITRATE 20 MG: 20 TABLET ORAL at 21:24

## 2020-05-09 RX ADMIN — ATORVASTATIN CALCIUM 80 MG: 80 TABLET, FILM COATED ORAL at 21:26

## 2020-05-09 ASSESSMENT — PAIN SCALES - GENERAL
PAINLEVEL_OUTOF10: 0
PAINLEVEL_OUTOF10: 3
PAINLEVEL_OUTOF10: 0

## 2020-05-09 NOTE — PROGRESS NOTES
Department of Internal Medicine  General Internal Medicine  Attending Progress Note      SUBJECTIVE:  Pt seen and examined. Lying in bed.  No new complaints    OBJECTIVE      Medications    Current Facility-Administered Medications: acetaminophen (TYLENOL) tablet 500 mg, 500 mg, Oral, TID  ipratropium-albuterol (DUONEB) nebulizer solution 1 ampule, 1 ampule, Inhalation, Q4H PRN  aspirin chewable tablet 81 mg, 81 mg, Oral, Daily  atorvastatin (LIPITOR) tablet 80 mg, 80 mg, Oral, Daily  vitamin B-12 (CYANOCOBALAMIN) tablet 1,000 mcg, 1,000 mcg, Oral, Daily  folic acid (FOLVITE) tablet 1 mg, 1 mg, Oral, Daily  gabapentin (NEURONTIN) capsule 400 mg, 400 mg, Oral, BID  hydrALAZINE (APRESOLINE) tablet 50 mg, 50 mg, Oral, 3 times per day  insulin glargine (LANTUS) injection vial 35 Units, 35 Units, Subcutaneous, Nightly  isosorbide dinitrate (ISORDIL) tablet 20 mg, 20 mg, Oral, TID  levothyroxine (SYNTHROID) tablet 50 mcg, 50 mcg, Oral, Daily  metoprolol succinate (TOPROL XL) extended release tablet 50 mg, 50 mg, Oral, BID  sertraline (ZOLOFT) tablet 200 mg, 200 mg, Oral, Daily  ticagrelor (BRILINTA) tablet 90 mg, 90 mg, Oral, BID  sodium chloride flush 0.9 % injection 10 mL, 10 mL, Intravenous, 2 times per day  sodium chloride flush 0.9 % injection 10 mL, 10 mL, Intravenous, PRN  acetaminophen (TYLENOL) tablet 650 mg, 650 mg, Oral, Q6H PRN **OR** acetaminophen (TYLENOL) suppository 650 mg, 650 mg, Rectal, Q6H PRN  polyethylene glycol (GLYCOLAX) packet 17 g, 17 g, Oral, Daily PRN  promethazine (PHENERGAN) tablet 12.5 mg, 12.5 mg, Oral, Q6H PRN **OR** ondansetron (ZOFRAN) injection 4 mg, 4 mg, Intravenous, Q6H PRN  potassium chloride (KLOR-CON M) extended release tablet 40 mEq, 40 mEq, Oral, PRN **OR** potassium bicarb-citric acid (EFFER-K) effervescent tablet 40 mEq, 40 mEq, Oral, PRN **OR** potassium chloride 10 mEq/100 mL IVPB (Peripheral Line), 10 mEq, Intravenous, PRN  enoxaparin (LOVENOX) injection 40 mg, 40 mg,

## 2020-05-09 NOTE — PROGRESS NOTES
Otherwise severely limited examination secondary to motion artifact. Pituitary gland sellar/suprasellar regions are nondiagnostic. Mild cerebral volume loss/atrophy. No extra-axial fluid collection or hematoma. T2 flow-voids are nondiagnostic secondary to motion artifact. Optic globes and orbital contents nondiagnostic secondary to motion artifact. Paranasal sinuses and mastoid air cells grossly unremarkable. No intrinsic noncontrast T1 shortening. Scattered foci of T2 FLAIR hyperintensity noted in the left frontal lobe white matter and periventricular regions. T2 star gradient imaging demonstrates no large areas of blooming artifact or intraparenchymal hemorrhage. Susceptibility weighted imaging also demonstrating a large areas of blooming artifact. Severely limited secondary to motion artifact. Referring physician has been paged 1. Small focus of restricted diffusion involving the cortical/subcortical region and extending in the deep white matter of the posterior right frontal lobe consistent with acute/subacute cerebrovascular infarction/accident. 2. Mild cerebral volume loss/atrophy with white matter changes consistent with sequelae small vessel ischemic disease. 3. Otherwise, severely limited examination secondary to multiple sequences having severe/nondiagnostic/near nondiagnostic motion artifact. Recent Labs     05/07/20  0551   WBC 8.4   HGB 12.1        Recent Labs     05/06/20  1426 05/07/20  0551   NA  --  137   K 4.4 4.6   CL  --  104   CO2  --  23   BUN  --  18   CREATININE  --  1.06*   GLUCOSE  --  146*     No results for input(s): BILITOT, ALKPHOS, AST, ALT in the last 72 hours. Lab Results   Component Value Date    PROTIME 13.7 05/06/2020    INR 1.0 05/06/2020     No results found for: LITHIUM, DILFRTOT, VALPROATE    ASSESSMENT AND PLAN  Acute right frontal small CVA likely representing a blood vessel more than acute stroke. This cannot be ruled out.   Patient is already on dual

## 2020-05-10 LAB
GLUCOSE BLD-MCNC: 116 MG/DL (ref 60–115)
GLUCOSE BLD-MCNC: 138 MG/DL (ref 60–115)
PERFORMED ON: ABNORMAL
PERFORMED ON: ABNORMAL

## 2020-05-10 PROCEDURE — 2580000003 HC RX 258: Performed by: INTERNAL MEDICINE

## 2020-05-10 PROCEDURE — 6360000002 HC RX W HCPCS: Performed by: INTERNAL MEDICINE

## 2020-05-10 PROCEDURE — 99232 SBSQ HOSP IP/OBS MODERATE 35: CPT | Performed by: PSYCHIATRY & NEUROLOGY

## 2020-05-10 PROCEDURE — 2060000000 HC ICU INTERMEDIATE R&B

## 2020-05-10 PROCEDURE — 92610 EVALUATE SWALLOWING FUNCTION: CPT

## 2020-05-10 PROCEDURE — 6370000000 HC RX 637 (ALT 250 FOR IP): Performed by: INTERNAL MEDICINE

## 2020-05-10 RX ADMIN — ASPIRIN 81 MG 81 MG: 81 TABLET ORAL at 08:04

## 2020-05-10 RX ADMIN — METOPROLOL SUCCINATE 50 MG: 50 TABLET, EXTENDED RELEASE ORAL at 08:05

## 2020-05-10 RX ADMIN — METOPROLOL SUCCINATE 50 MG: 50 TABLET, EXTENDED RELEASE ORAL at 20:40

## 2020-05-10 RX ADMIN — ATORVASTATIN CALCIUM 80 MG: 80 TABLET, FILM COATED ORAL at 08:04

## 2020-05-10 RX ADMIN — FOLIC ACID 1 MG: 1 TABLET ORAL at 08:04

## 2020-05-10 RX ADMIN — INSULIN GLARGINE 35 UNITS: 100 INJECTION, SOLUTION SUBCUTANEOUS at 20:41

## 2020-05-10 RX ADMIN — TICAGRELOR 90 MG: 90 TABLET ORAL at 08:05

## 2020-05-10 RX ADMIN — TICAGRELOR 90 MG: 90 TABLET ORAL at 20:39

## 2020-05-10 RX ADMIN — CYANOCOBALAMIN TAB 500 MCG 1000 MCG: 500 TAB at 08:04

## 2020-05-10 RX ADMIN — GABAPENTIN 400 MG: 400 CAPSULE ORAL at 20:40

## 2020-05-10 RX ADMIN — HYDRALAZINE HYDROCHLORIDE 50 MG: 50 TABLET, FILM COATED ORAL at 15:52

## 2020-05-10 RX ADMIN — HYDRALAZINE HYDROCHLORIDE 50 MG: 50 TABLET, FILM COATED ORAL at 20:40

## 2020-05-10 RX ADMIN — ACETAMINOPHEN 500 MG: 500 TABLET ORAL at 15:48

## 2020-05-10 RX ADMIN — ISOSORBIDE DINITRATE 20 MG: 20 TABLET ORAL at 20:39

## 2020-05-10 RX ADMIN — LEVOTHYROXINE SODIUM 50 MCG: 0.05 TABLET ORAL at 06:31

## 2020-05-10 RX ADMIN — SERTRALINE 200 MG: 100 TABLET, FILM COATED ORAL at 08:04

## 2020-05-10 RX ADMIN — Medication 10 ML: at 08:05

## 2020-05-10 RX ADMIN — ACETAMINOPHEN 500 MG: 500 TABLET ORAL at 08:04

## 2020-05-10 RX ADMIN — GABAPENTIN 400 MG: 400 CAPSULE ORAL at 08:05

## 2020-05-10 RX ADMIN — HYDRALAZINE HYDROCHLORIDE 50 MG: 50 TABLET, FILM COATED ORAL at 06:31

## 2020-05-10 RX ADMIN — ACETAMINOPHEN 500 MG: 500 TABLET ORAL at 20:40

## 2020-05-10 RX ADMIN — ENOXAPARIN SODIUM 40 MG: 40 INJECTION SUBCUTANEOUS at 08:04

## 2020-05-10 ASSESSMENT — PAIN SCALES - GENERAL
PAINLEVEL_OUTOF10: 0
PAINLEVEL_OUTOF10: 2
PAINLEVEL_OUTOF10: 0
PAINLEVEL_OUTOF10: 0

## 2020-05-10 NOTE — PROGRESS NOTES
Oral BID Katlyn Mcgraw MD   90 mg at 05/09/20 0904    sodium chloride flush 0.9 % injection 10 mL  10 mL Intravenous 2 times per day Katlyn Mcgraw MD   10 mL at 05/09/20 0904    sodium chloride flush 0.9 % injection 10 mL  10 mL Intravenous PRN Katlyn Mcgraw MD        acetaminophen (TYLENOL) tablet 650 mg  650 mg Oral Q6H PRN Katlyn Mcgraw MD        Or    acetaminophen (TYLENOL) suppository 650 mg  650 mg Rectal Q6H PRN Katlyn Mcgraw MD        polyethylene glycol (GLYCOLAX) packet 17 g  17 g Oral Daily PRN Katlyn Mcgraw MD        promethazine (PHENERGAN) tablet 12.5 mg  12.5 mg Oral Q6H PRN Katlyn Mcgraw MD        Or    ondansetron [facility]) injection 4 mg  4 mg Intravenous Q6H PRN Katlyn Mcgraw MD   4 mg at 05/06/20 2028    potassium chloride (KLOR-CON M) extended release tablet 40 mEq  40 mEq Oral PRN Katlyn Mcgraw MD        Or    potassium bicarb-citric acid (EFFER-K) effervescent tablet 40 mEq  40 mEq Oral PRN Katlyn Mcgraw MD        Or    potassium chloride 10 mEq/100 mL IVPB (Peripheral Line)  10 mEq Intravenous PRN Katlyn Mcgraw MD        enoxaparin (LOVENOX) injection 40 mg  40 mg Subcutaneous Daily Katlyn Mcgraw MD   40 mg at 05/09/20 0904    meclizine (ANTIVERT) tablet 12.5 mg  12.5 mg Oral TID PRN Katlyn Mcgraw MD   12.5 mg at 05/07/20 0845    0.9 % sodium chloride infusion   Intravenous Continuous Katlyn Mcgraw MD 50 mL/hr at 05/07/20 1442      insulin lispro (HUMALOG) injection vial 0-6 Units  0-6 Units Subcutaneous TID [facility]   2 Units at 05/09/20 1214    insulin lispro (HUMALOG) injection vial 0-3 Units  0-3 Units Subcutaneous Nightly [facility] DO   Stopped at 05/06/20 2205    glucose (GLUTOSE) 40 % oral gel 15 g  15 g Oral PRN [facility] DO        dextrose 50 % IV solution  12.5 g Intravenous PRN [facility] DO        glucagon (rDNA) injection 1 mg  1 mg Intramuscular PRN [facility] DO        dextrose 5 % solution  100 mL/hr Intravenous PRN [facility] Findings: Focal area of restricted diffusion in the deep white matter of the posterior right frontal lobe. Otherwise severely limited examination secondary to motion artifact. Pituitary gland sellar/suprasellar regions are nondiagnostic. Mild cerebral volume loss/atrophy. No extra-axial fluid collection or hematoma. T2 flow-voids are nondiagnostic secondary to motion artifact. Optic globes and orbital contents nondiagnostic secondary to motion artifact. Paranasal sinuses and mastoid air cells grossly unremarkable. No intrinsic noncontrast T1 shortening. Scattered foci of T2 FLAIR hyperintensity noted in the left frontal lobe white matter and periventricular regions. T2 star gradient imaging demonstrates no large areas of blooming artifact or intraparenchymal hemorrhage. Susceptibility weighted imaging also demonstrating a large areas of blooming artifact. Severely limited secondary to motion artifact. Referring physician has been paged 1. Small focus of restricted diffusion involving the cortical/subcortical region and extending in the deep white matter of the posterior right frontal lobe consistent with acute/subacute cerebrovascular infarction/accident. 2. Mild cerebral volume loss/atrophy with white matter changes consistent with sequelae small vessel ischemic disease. 3. Otherwise, severely limited examination secondary to multiple sequences having severe/nondiagnostic/near nondiagnostic motion artifact. Recent Labs     05/07/20  0551   WBC 8.4   HGB 12.1        Recent Labs     05/06/20  1426 05/07/20  0551   NA  --  137   K 4.4 4.6   CL  --  104   CO2  --  23   BUN  --  18   CREATININE  --  1.06*   GLUCOSE  --  146*     No results for input(s): BILITOT, ALKPHOS, AST, ALT in the last 72 hours.   Lab Results   Component Value Date    PROTIME 13.7 05/06/2020    INR 1.0 05/06/2020     No results found for: LITHIUM, DILFRTOT, VALPROATE    ASSESSMENT AND PLAN  Acute right frontal small CVA likely representing a blood vessel more than acute stroke. This cannot be ruled out. Patient is already on dual antiplatelet therapy which will continue. Patient does not have any large vessel disease. Patient presented with dizziness which appears to be multifactorial related to underlying multiple medications and her cardiorespiratory dysfunction. Patient studies and MRI were reviewed personally. She will require rehabilitation. We did try to discuss this finding with the patient though we have some language barrier here. Patient also had hyponatremia which is resolved which may have added to her dizziness. Patient appears to be much better and she actually was able to communicate with me in English to some degree and she does not complain of any dizziness she is sitting at the bedside and eating very well without any choking and on examination appears to be normal except that she may have some gait ataxia according to the therapist and nursing staff that I see and will be transferred to rehabilitation. Patient with transfer to limitation from neurological standpoint. Ced Fair MD, Coco Farah, American Board of Psychiatry & Neurology  Board Certified in Vascular Neurology  Board Certified in Neuromuscular Medicine  Certified in . [address]

## 2020-05-10 NOTE — PROGRESS NOTES
[place])    Education  Patient Education: Patient educated on results of BSE  Patient Education Response: Verbalizes understanding  Safety Devices in place: Yes  Type of devices: Bed alarm in place;Call light within reach    Pain:  Pain Assessment  Patient Currently in Pain: Denies  Pain Assessment: 0-10  Pain Level: 0  Pain Type: Acute pain  Pain Location: Foot       Therapy Time  SLP Individual Minutes  Time In: 0815  Time Out: 0830  Minutes: 15              Signature: Electronically signed by NIRMAL Howard on 5/10/2020 at 8:44 AM

## 2020-05-10 NOTE — PROGRESS NOTES
Subjective: The patient complains of moderate  acute pain relieved by rest and exacerbated by activity. I am concerned about patients fatigue, loss balance , inconsistant performance and falls risk. ROS x10: The patient also complains of severely impaired mobility and activities of daily living. Otherwise no new problems with vision, hearing, nose, mouth, throat, dermal, cardiovascular, GI, , pulmonary, musculoskeletal, psychiatric or neurological. See Rehab H&P on Rehab chart dated . Vital signs:  BP (!) 134/59   Pulse 73   Temp 98.2 °F (36.8 °C) (Oral)   Resp 18   Ht 5' 5\" (1.651 m)   Wt 221 lb 3 oz (100.3 kg)   SpO2 100%   BMI 36.81 kg/m²   I/O:   PO/Intake:  fair PO intake, no problems observed or reported. Bowel/Bladder:  continent, no problems noted. General:  Patient is well developed, adequately nourished, non-obese and     well kempt. HEENT:    PERRLA, hearing intact to loud voice, external inspection of ear     and nose benign. Inspection of lips, tongue and gums benign  Musculoskeletal: No significant change in strength or tone. All joints stable. Inspection and palpation of digits and nails show no clubbing,       cyanosis or inflammatory conditions. Neuro/Psychiatric: Affect: flat but pleasant. Alert and oriented to person, place and     situation. No significant change in deep tendon reflexes or     sensation  Lungs:  CTA-B. Respiration effort is normal at rest.     Heart:   S1 = S2, RRR. No loud murmurs. Abdomen:  Soft, non-tender, no enlargement of liver or spleen. Extremities:  No significant lower extremity edema or tenderness. Skin:   Intact to general survey, no visualized or palpated problems.     Rehabilitation:  Physical therapy: FIMS:  Bed Mobility:      Transfers: Sit to Stand: Stand by assistance  Stand to sit: Stand by assistance, Ambulation 1  Surface: level tile  Device: Rolling Walker  Assistance: Stand by assistance  Quality of Gait: reduce radiation dose to as low as reasonably achievable. Ct Cervical Spine Wo Contrast    Result Date: 4/21/2020  EXAM: CT CERVICAL SPINE WO CONTRAST COMPARISON: None available REASON FOR EXAMINATION:  Neck pain after a fall. Trauma. TECHNIQUE: Multiple contiguous axial CT images of the cervical spine were obtained. Multiplanar reformats performed. FINDINGS:  No acute fracture or malalignment. Atlanto-occipital articulation is maintained. Atlantodental interval is preserved. Cervical spinal vertebral body heights are preserved. Mild multilevel degenerative changes of the cervical spine. Straightening of the  cervical lordosis. There is no prevertebral soft tissue swelling. Please see CT chest report regarding lung apex findings. No acute fracture or malalignment. All CT scans at this facility use dose modulation, iterative reconstruction, and/or weight based dosing when appropriate to reduce radiation dose to as low as reasonably achievable. Xr Chest Portable    Result Date: 5/6/2020  Portable chest radiograph History: Dizziness. Shortness of breath. Technique: AP portable view of the chest obtained. Comparison: Chest x-rays from April 28, 2020 Findings: Postsurgical changes of CABG. Heart size is mildly enlarged. Ronny Curb No pneumothorax, pleural effusion, or consolidation. Bones of the thorax appear intact. No radiographic evidence of acute intrathoracic process. Mild cardiomegaly. Xr Chest Portable    Result Date: 4/21/2020  EXAMINATION: XR CHEST PORTABLE CLINICAL HISTORY: History of fall. Altered mental status. COMPARISONS: 2/11/2020 FINDINGS: Cardiac size is borderline. Pulmonary vascularity is normal. The lungs are clear. There is no evidence of adenopathy. There are sternotomy wires and mediastinal clips. Bones are unremarkable. There is a small calcification adjacent to the left humeral greater tuberosity suggesting calcific tendinopathy. NO ACUTE CHEST DISEASE.      Mri Brain Wo severe/nondiagnostic/near nondiagnostic motion artifact. Us Retroperitoneal Limited    Result Date: 4/26/2020  EXAMINATION: US RETROPERITONEAL LIMITED CLINICAL HISTORY: ACUTE RENAL INSUFFICIENCY FINDINGS: Right kidney measures 10.2 x 5.8 x 5.6 cm. Left kidney measures 10.0 x 4.5 x 5.1 cm. No pelvocaliectasis, perinephric fluid, or calculi bilaterally. Color flow without anomaly bilaterally. No cystic and no solid masses bilaterally. NEGATIVE RENAL ULTRASOUND. Us Carotid Artery Bilateral    Result Date: 4/21/2020  BILATERAL DUPLEX CAROTID ULTRASOUND CLINICAL INFORMATION:  SYNCOPE COMPARISON: Ultrasound carotid artery from May 26, 2019 FINDINGS:  The bilateral carotid duplex ultrasound study demonstrates the following:                                                                  RIGHT            LEFT Proximal common carotid artery           85 cm/s            110 cm/s  Mid common carotid artery                   66 cm/s            57 cm/s  Distal common carotid artery                78 cm/s           86 cm/s Proximal internal carotid artery             104 cm/s            151 cm/s Mid internal carotid artery                      88 cm/s           140 cm/s Distal internal carotid artery                  118 cm/s             80 cm/s External carotid artery                            81 cm/s           112 cm/s    ICA/CCA ratio                                         1.8                2.7   Vertebral arteries antegrade flow         Yes                     Yes      50-69% STENOSIS OF THE LEFT INTERNAL CAROTID ARTERY. LESS THAN 50% STENOSIS OF THE RIGHT INTERNAL CAROTID ARTERY IDENTIFIED ON TODAY'S EXAMINATION. ANTEGRADE FLOW OF THE BILATERAL VERTEBRAL ARTERIES. Validated velocity measurements with angiographic measurements, velocity criteria are extrapolated from diameter data as defined by the Society of Radiologist in [address] [place] [address] Drive Radiology 2003; [phone].         Previous extensive, ([facility]    Cellulitis    Syncope and collapse    Severe mitral regurgitation    Ischemic cardiomyopathy    Pulmonary hypertension (HCC)    Acute on chronic combined systolic and diastolic congestive heart failure (HCC)    Nocturnal hypoxia    [facility]    Dizziness    Acute CVA (cerebrovascular accident) ([facility]    Gait abnormality    Anxiety    Gastritis, unspecified, without bleeding    Pure hypercholesterolemia    Schizophrenia, unspecified ([facility]    CAD in native artery    Extrapyramidal syndrome    Gangrene of toe of left foot ([facility]    Hypotension due to drugs    Osteomyelitis of right foot ([facility])    Nausea and vomiting   1.    Rm Nash D.O., PM&R     Attending    [address]

## 2020-05-11 LAB
GLUCOSE BLD-MCNC: 129 MG/DL (ref 60–115)
GLUCOSE BLD-MCNC: 136 MG/DL (ref 60–115)
GLUCOSE BLD-MCNC: 149 MG/DL (ref 60–115)
GLUCOSE BLD-MCNC: 78 MG/DL (ref 60–115)
PERFORMED ON: ABNORMAL
PERFORMED ON: NORMAL

## 2020-05-11 PROCEDURE — 99233 SBSQ HOSP IP/OBS HIGH 50: CPT | Performed by: PSYCHIATRY & NEUROLOGY

## 2020-05-11 PROCEDURE — APPSS15 APP SPLIT SHARED TIME 0-15 MINUTES: Performed by: NURSE PRACTITIONER

## 2020-05-11 PROCEDURE — 6370000000 HC RX 637 (ALT 250 FOR IP): Performed by: INTERNAL MEDICINE

## 2020-05-11 PROCEDURE — 2060000000 HC ICU INTERMEDIATE R&B

## 2020-05-11 PROCEDURE — 2580000003 HC RX 258: Performed by: INTERNAL MEDICINE

## 2020-05-11 PROCEDURE — 99231 SBSQ HOSP IP/OBS SF/LOW 25: CPT | Performed by: PHYSICAL MEDICINE & REHABILITATION

## 2020-05-11 PROCEDURE — 97116 GAIT TRAINING THERAPY: CPT

## 2020-05-11 PROCEDURE — 6360000002 HC RX W HCPCS: Performed by: INTERNAL MEDICINE

## 2020-05-11 RX ORDER — ACETAMINOPHEN 325 MG/1
650 TABLET ORAL EVERY 6 HOURS PRN
Status: CANCELLED | OUTPATIENT
Start: 2020-05-11

## 2020-05-11 RX ORDER — INSULIN GLARGINE 100 [IU]/ML
25 INJECTION, SOLUTION SUBCUTANEOUS NIGHTLY
Status: CANCELLED | OUTPATIENT
Start: 2020-05-11

## 2020-05-11 RX ORDER — ACETAMINOPHEN 650 MG/1
650 SUPPOSITORY RECTAL EVERY 6 HOURS PRN
Status: CANCELLED | OUTPATIENT
Start: 2020-05-11

## 2020-05-11 RX ORDER — SERTRALINE HYDROCHLORIDE 100 MG/1
200 TABLET, FILM COATED ORAL DAILY
Status: CANCELLED | OUTPATIENT
Start: 2020-05-12

## 2020-05-11 RX ORDER — INSULIN GLARGINE 100 [IU]/ML
35 INJECTION, SOLUTION SUBCUTANEOUS NIGHTLY
Status: CANCELLED | OUTPATIENT
Start: 2020-05-11

## 2020-05-11 RX ORDER — HYDRALAZINE HYDROCHLORIDE 50 MG/1
50 TABLET, FILM COATED ORAL EVERY 8 HOURS SCHEDULED
Status: CANCELLED | OUTPATIENT
Start: 2020-05-11

## 2020-05-11 RX ORDER — SODIUM CHLORIDE 0.9 % (FLUSH) 0.9 %
10 SYRINGE (ML) INJECTION EVERY 12 HOURS SCHEDULED
Status: CANCELLED | OUTPATIENT
Start: 2020-05-11

## 2020-05-11 RX ORDER — ISOSORBIDE DINITRATE 20 MG/1
20 TABLET ORAL 3 TIMES DAILY
Status: CANCELLED | OUTPATIENT
Start: 2020-05-11

## 2020-05-11 RX ORDER — MECLIZINE HCL 12.5 MG/1
12.5 TABLET ORAL 3 TIMES DAILY PRN
Status: CANCELLED | OUTPATIENT
Start: 2020-05-11

## 2020-05-11 RX ORDER — FOLIC ACID 1 MG/1
1 TABLET ORAL DAILY
Status: CANCELLED | OUTPATIENT
Start: 2020-05-12

## 2020-05-11 RX ORDER — METOPROLOL SUCCINATE 50 MG/1
50 TABLET, EXTENDED RELEASE ORAL 2 TIMES DAILY
Status: CANCELLED | OUTPATIENT
Start: 2020-05-11

## 2020-05-11 RX ORDER — GABAPENTIN 400 MG/1
400 CAPSULE ORAL 2 TIMES DAILY
Status: CANCELLED | OUTPATIENT
Start: 2020-05-11

## 2020-05-11 RX ORDER — CHOLECALCIFEROL (VITAMIN D3) 125 MCG
1000 CAPSULE ORAL DAILY
Status: CANCELLED | OUTPATIENT
Start: 2020-05-12

## 2020-05-11 RX ORDER — ASPIRIN 81 MG/1
81 TABLET, CHEWABLE ORAL DAILY
Status: CANCELLED | OUTPATIENT
Start: 2020-05-12

## 2020-05-11 RX ORDER — ATORVASTATIN CALCIUM 80 MG/1
80 TABLET, FILM COATED ORAL DAILY
Status: CANCELLED | OUTPATIENT
Start: 2020-05-12

## 2020-05-11 RX ORDER — LEVOTHYROXINE SODIUM 0.05 MG/1
50 TABLET ORAL DAILY
Status: CANCELLED | OUTPATIENT
Start: 2020-05-12

## 2020-05-11 RX ORDER — INSULIN GLARGINE 100 [IU]/ML
25 INJECTION, SOLUTION SUBCUTANEOUS NIGHTLY
Status: DISCONTINUED | OUTPATIENT
Start: 2020-05-11 | End: 2020-05-12 | Stop reason: HOSPADM

## 2020-05-11 RX ORDER — IPRATROPIUM BROMIDE AND ALBUTEROL SULFATE 2.5; .5 MG/3ML; MG/3ML
1 SOLUTION RESPIRATORY (INHALATION) EVERY 4 HOURS PRN
Status: CANCELLED | OUTPATIENT
Start: 2020-05-11

## 2020-05-11 RX ORDER — NICOTINE POLACRILEX 4 MG
15 LOZENGE BUCCAL PRN
Status: CANCELLED | OUTPATIENT
Start: 2020-05-11

## 2020-05-11 RX ORDER — POLYETHYLENE GLYCOL 3350 17 G/17G
17 POWDER, FOR SOLUTION ORAL DAILY PRN
Status: CANCELLED | OUTPATIENT
Start: 2020-05-11

## 2020-05-11 RX ADMIN — GABAPENTIN 400 MG: 400 CAPSULE ORAL at 08:38

## 2020-05-11 RX ADMIN — ACETAMINOPHEN 500 MG: 500 TABLET ORAL at 14:12

## 2020-05-11 RX ADMIN — GABAPENTIN 400 MG: 400 CAPSULE ORAL at 21:05

## 2020-05-11 RX ADMIN — INSULIN GLARGINE 25 UNITS: 100 INJECTION, SOLUTION SUBCUTANEOUS at 21:11

## 2020-05-11 RX ADMIN — SERTRALINE 200 MG: 100 TABLET, FILM COATED ORAL at 08:38

## 2020-05-11 RX ADMIN — METOPROLOL SUCCINATE 50 MG: 50 TABLET, EXTENDED RELEASE ORAL at 08:38

## 2020-05-11 RX ADMIN — FOLIC ACID 1 MG: 1 TABLET ORAL at 08:38

## 2020-05-11 RX ADMIN — METOPROLOL SUCCINATE 50 MG: 50 TABLET, EXTENDED RELEASE ORAL at 21:05

## 2020-05-11 RX ADMIN — LEVOTHYROXINE SODIUM 50 MCG: 0.05 TABLET ORAL at 06:25

## 2020-05-11 RX ADMIN — HYDRALAZINE HYDROCHLORIDE 50 MG: 50 TABLET, FILM COATED ORAL at 21:05

## 2020-05-11 RX ADMIN — ISOSORBIDE DINITRATE 20 MG: 20 TABLET ORAL at 08:38

## 2020-05-11 RX ADMIN — ISOSORBIDE DINITRATE 20 MG: 20 TABLET ORAL at 21:05

## 2020-05-11 RX ADMIN — TICAGRELOR 90 MG: 90 TABLET ORAL at 08:38

## 2020-05-11 RX ADMIN — HYDRALAZINE HYDROCHLORIDE 50 MG: 50 TABLET, FILM COATED ORAL at 14:12

## 2020-05-11 RX ADMIN — Medication 10 ML: at 21:06

## 2020-05-11 RX ADMIN — ENOXAPARIN SODIUM 40 MG: 40 INJECTION SUBCUTANEOUS at 08:37

## 2020-05-11 RX ADMIN — ASPIRIN 81 MG 81 MG: 81 TABLET ORAL at 08:38

## 2020-05-11 RX ADMIN — ATORVASTATIN CALCIUM 80 MG: 80 TABLET, FILM COATED ORAL at 08:38

## 2020-05-11 RX ADMIN — Medication 10 ML: at 08:37

## 2020-05-11 RX ADMIN — ACETAMINOPHEN 500 MG: 500 TABLET ORAL at 21:05

## 2020-05-11 RX ADMIN — HYDRALAZINE HYDROCHLORIDE 50 MG: 50 TABLET, FILM COATED ORAL at 06:25

## 2020-05-11 RX ADMIN — TICAGRELOR 90 MG: 90 TABLET ORAL at 21:05

## 2020-05-11 RX ADMIN — ACETAMINOPHEN 500 MG: 500 TABLET ORAL at 08:38

## 2020-05-11 RX ADMIN — CYANOCOBALAMIN TAB 500 MCG 1000 MCG: 500 TAB at 08:38

## 2020-05-11 RX ADMIN — ISOSORBIDE DINITRATE 20 MG: 20 TABLET ORAL at 14:12

## 2020-05-11 ASSESSMENT — ENCOUNTER SYMPTOMS
WHEEZING: 0
NAUSEA: 0
SHORTNESS OF BREATH: 0
COLOR CHANGE: 0
TROUBLE SWALLOWING: 0
CHEST TIGHTNESS: 0
VOMITING: 0
COUGH: 0

## 2020-05-11 ASSESSMENT — PAIN SCALES - GENERAL
PAINLEVEL_OUTOF10: 1
PAINLEVEL_OUTOF10: 0
PAINLEVEL_OUTOF10: 2

## 2020-05-11 ASSESSMENT — PAIN DESCRIPTION - ORIENTATION: ORIENTATION: LEFT

## 2020-05-11 ASSESSMENT — PAIN DESCRIPTION - DESCRIPTORS: DESCRIPTORS: ACHING

## 2020-05-11 ASSESSMENT — PAIN DESCRIPTION - PAIN TYPE: TYPE: ACUTE PAIN

## 2020-05-11 ASSESSMENT — PAIN DESCRIPTION - LOCATION: LOCATION: FOOT

## 2020-05-11 NOTE — PROGRESS NOTES
Subjective: The patient complains of moderate to severe  acute on chronic progressive fatigue and dizziness partially relieved by rest, PT, OT and exacerbated by recent right frontal lobe CVA. I am concerned about patients falls risk due to her cognitive deficits. ROS x10: The patient also complains of severely impaired mobility and activities of daily living. Otherwise no new problems with vision, hearing, nose, mouth, throat, dermal, cardiovascular, GI, , pulmonary, musculoskeletal, psychiatric or neurological. See Rehab H&P on Rehab chart dated . Vital signs:  /83   Pulse 61   Temp 97.9 °F (36.6 °C) (Oral)   Resp 18   Ht 5' 5\" (1.651 m)   Wt 221 lb 3 oz (100.3 kg)   SpO2 100%   BMI 36.81 kg/m²   I/O:   PO/Intake:    fair PO intake, no problems observed or reported. Bowel/Bladder:  continent, no problems noted. General:  Patient is well developed, adequately nourished, non-obese and     well kempt. HEENT:    PERRLA, hearing intact to loud voice, external inspection of ear     and nose benign. Inspection of lips, tongue and gums benign  Musculoskeletal: No significant change in strength or tone. All joints stable. Inspection and palpation of digits and nails show no clubbing,       cyanosis or inflammatory conditions. Neuro/Psychiatric: Affect: flat but pleasant. Alert and oriented to person, place and     situation. No significant change in deep tendon reflexes or     sensation  Lungs:  Diminished, CTA-B. Respiration effort is normal at rest.     Heart:   S1 = S2,  RRR. No loud murmurs. Abdomen:  Soft, non-tender, no enlargement of liver or spleen. Extremities:  No significant lower extremity edema or tenderness. Skin:    BUE bruises dt blood draws, no visualized or palpated problems.     Rehabilitation:  Physical therapy: FIMS:  Bed Mobility:      Transfers: Sit to Stand: Stand by assistance  Stand to sit: Stand by assistance, Ambulation 1  Surface: level problems.  Kristen Montemayor D.O., PM&R     Attending    [address]

## 2020-05-11 NOTE — CARE COORDINATION
Plan DC to Rehab pending pre-cert. Per Gurdeep Stewart in [place], will notify when pre-cert received.

## 2020-05-11 NOTE — PROGRESS NOTES
50 mg Oral 3 times per day    isosorbide dinitrate  20 mg Oral TID    levothyroxine  50 mcg Oral Daily    metoprolol succinate  50 mg Oral BID    sertraline  200 mg Oral Daily    ticagrelor  90 mg Oral BID    sodium chloride flush  10 mL Intravenous 2 times per day    enoxaparin  40 mg Subcutaneous Daily    insulin lispro  0-6 Units Subcutaneous TID WC    insulin lispro  0-3 Units Subcutaneous Nightly     PRN Meds: ipratropium-albuterol, sodium chloride flush, acetaminophen **OR** acetaminophen, polyethylene glycol, promethazine **OR** ondansetron, potassium chloride **OR** potassium alternative oral replacement **OR** potassium chloride, meclizine, glucose, dextrose, glucagon (rDNA), dextrose    Labs:   No results for input(s): WBC, HGB, HCT, PLT in the last 72 hours. No results for input(s): NA, K, CL, CO2, BUN, CREATININE, CALCIUM, PHOS in the last 72 hours. Invalid input(s): MAGNES  No results for input(s): AST, ALT, BILIDIR, BILITOT, ALKPHOS in the last 72 hours. No results for input(s): INR in the last 72 hours. No results for input(s): Launie [place] in the last 72 hours. Urinalysis:   Lab Results   Component Value Date    NITRU Negative 2020    BLOODU Negative 2020    SPECGRAV 1.015 2020    GLUCOSEU 250 2020       Radiology:   Most recent    EEG No procedure found. MRI of Brain No results found for this or any previous visit. Results for orders placed during the hospital encounter of 20   MRI BRAIN WO CONTRAST    Addendum Addendum: The covering physician has been notified. Christine Chavez MD 2020  3:11 PM          Narrative Patient MRN: [id number]    : 1955    Order Date: 2020 1:00 PM.     Exam: MRI BRAIN WO CONTRAST    Number of Views: 810     Indication: 56-year-old female Presenting to emergency department with dizziness since afternoon. Patient reports intractable nausea, vomiting. Patient reporting loss of balance.  History of Narrative EXAMINATION: CT of the brain without contrast    HISTORY: Dizziness. Nausea and vomiting. Altered mental status. COMPARISON: CT brain from April 21, 2020    TECHNIQUE: Multiple contiguous axial images were obtained of the brain from the skull base through the vertex. Multiplanar reformats were obtained. FINDINGS:    Prominence of the sulci and ventricles compatible with mild generalized parenchymal volume loss. Gray-white matter differentiation is preserved. No acute hemorrhage or abnormal extra-axial fluid collection. No mass effect or midline shift. The visualized paranasal sinuses and mastoid air cells are clear. Calvarium is intact. Impression No acute intracranial process. All CT scans at this facility use dose modulation, iterative reconstruction, and/or weight based dosing when appropriate to reduce radiation dose to as low as reasonably achievable. No results found for this or any previous visit. No results found for this or any previous visit. Carotid duplex: No results found for this or any previous visit. No results found for this or any previous visit.   Results for orders placed during the hospital encounter of 04/21/20   US CAROTID ARTERY BILATERAL    Narrative BILATERAL DUPLEX CAROTID ULTRASOUND    CLINICAL INFORMATION:  SYNCOPE    COMPARISON: Ultrasound carotid artery from May 26, 2019    FINDINGS:  The bilateral carotid duplex ultrasound study demonstrates the following:                                                                        RIGHT            LEFT      Proximal common carotid artery           85 cm/s            110 cm/s    Mid common carotid artery                   66 cm/s            57 cm/s    Distal common carotid artery                78 cm/s           86 cm/s  Proximal internal carotid artery             104 cm/s            151 cm/s  Mid internal carotid artery                      88 cm/s           140 cm/s  Distal internal carotid artery

## 2020-05-11 NOTE — PROGRESS NOTES
assistance  Quality of Gait: antaligic  Gait Deviations: Slow Jennifer, Decreased step length  Distance: 40ft  Comments: VC for safty, WBOS and ER BLE,      FIMS:  ,  , Assessment: Pt limited with english lang. Well motivated but fatigues with exercises. Occupational therapy: FIMS:   ,  , Assessment: Pt is a 59 y/o female from home with spouse who presents to [facility] with the above deficts. Pt would benefit from OT services to maximize independence and safety during ADLs and IADLs. Speech therapy: FIMS:        Lab/X-ray studies reviewed, analyzed and discussed with patient and staff:   Recent Results (from the past 24 hour(s))   POCT Glucose    Collection Time: 05/10/20  6:32 AM   Result Value Ref Range    POC Glucose 116 (H) 60 - 115 mg/dl    Performed on ACCU-CHEK    POCT Glucose    Collection Time: 05/10/20  3:54 PM   Result Value Ref Range    POC Glucose 138 (H) 60 - 115 mg/dl    Performed on ACCU-CHEK        Xr Chest Standard (2 Vw)    Result Date: 2020  Patient MRN: [id number] : 1955 Age:  60 years Gender: Female Order Date: 2020 7:00 AM. Exam: XR CHEST (2 VW) Number of Views: 2 Indication:  Shortness of breath and pulmonary hypertension. 60-year-old female presenting to emergency department with shortness of breath times several months, fall/syncope at home and evaluation of mental status changes Comparison: 2020 Findings: Stable, enlarged cardiomediastinal silhouette with underlying pulmonary edema and nonspecific bibasilar airspace disease. Median sternotomy wires. Vascular calcifications thoracic aorta. No pneumothorax. Impression:  1. Stable, enlarged cardiac mediastinal silhouette with underlying pulmonary edema 10. . Nonspecific bibasilar airspace disease, findings can be seen in infiltrate/pneumonia and/or atelectasis. .     Ct Head Wo Contrast    Result Date: 2020  EXAMINATION: CT of the brain without contrast HISTORY: Dizziness. Nausea and vomiting.  Altered mental intensity projection images were obtained. Comparison: Chest x-rays from February 11, 2020 Findings: Visualized portion of the thyroid gland is within normal limits. No axillary or mediastinal lymphadenopathy. Mildly enlarged bilateral hilar lymph nodes noted. No thoracic aortic aneurysm. Atherosclerotic ossification of the thoracic aorta. No large central pulmonary embolism identified. Respiratory motion artifact at the bases precludes optimal evaluation for segmental pulmonary emboli of the bilateral lower lobes. There is reflux of contrast into the hepatic veins. No evidence of right heart strain. Mitral annular calcification noted. Postsurgical changes of CABG Heart size is enlarged. A few small foci of air are identified within the right atrial appendage as well as within the veins of the right neck and left axillary region, presumably iatrogenic. Nonspecific scattered groundglass opacities throughout both lungs most significantly involving the bilateral lower lobes at the lung base. No pulmonary nodule or mass. No consolidation, pleural effusion, or pneumothorax. Airways are patent. No bronchiectasis. Degenerative changes of the spine. No acute osseous abnormality. No acute abnormality identified of the upper abdomen. Respiratory motion artifact precludes evaluation for subsegmental pulmonary emboli of the right and left lower lobes at the bases. No large central pulmonary embolism identified. Cardiomegaly. Reflux of contrast into the hepatic veins suggests right heart failure. Diffuse nonspecific bilateral groundglass opacities most significant within the right and left lower lobes at the lung bases may be infectious/inflammatory in etiology or related to pulmonary edema. All CT scans at this facility use dose modulation, iterative reconstruction, and/or weight based dosing when appropriate to reduce radiation dose to as low as reasonably achievable.      Ct Cervical Spine Wo Contrast    Result Date: - Review Complete 05/06/2020   Allergen Reaction Noted    Ambien [zolpidem tartrate]  02/21/2019    Capoten [captopril]  02/21/2019    Clioquinol  02/21/2019    Cogentin [benztropine]  02/21/2019    Depakote [divalproex sodium]  02/21/2019    Effexor xr [venlafaxine hcl er]  02/21/2019    Geodon [ziprasidone hcl]  02/21/2019    Lisinopril  04/22/2020    Lyrica [pregabalin]  11/20/2019    Navane [thiothixene]  02/21/2019    Pamelor [nortriptyline hcl]  02/21/2019    Remeron [mirtazapine]  02/21/2019    Risperdal [risperidone]  02/21/2019    Trazodone and nefazodone  02/21/2019    Wellbutrin [bupropion]  02/21/2019      (please also verify by checking [facility])     Complex Physical Medicine & Rehab Issues Assess & Plan:   1. Severe abnormality of gait and mobility and impaired self-care and ADL's secondary to progressive CVA . Functional and medical status reassessed regarding patients ability to participate in therapies and patient found to be able to participate in acute intensive comprehensive inpatient rehabilitation program including PT/OT to improve balance, ambulation, ADLs, and to improve the P/AROM. Therapeutic modifications regarding activities in therapies, place, amount of time per day and intensity of therapy made daily. In bed therapies or bedside therapies prn.   2. Bowel and Bladder dysfunction:  frequent toileting, ambulate to bathroom with assistance, check post void residuals. Check for C.difficile x1 if >2 loose stools in 24 hours, continue bowel & bladder program.  Monitor bowel and bladder function. Lactinex 2 PO every AC. MOM prn, Brown Bomb prn, Glycerin suppository prn, enema prn.   3. Moderate   pain as well as generalized OA pain: reassess pain every shift and prior to and after each therapy session, give prn Tylenol and  See mar, modalities prn in therapy, Lidoderm, K-pad prn.   4. Skin healing and breakdown risk:  continue pressure relief program.  Daily skin exams and reports from nursing. 5. Severe fatigue due to nutritional and hydration deficiency: Add vitamin B12 vitamin D and CoQ10 continue to monitor I&Os, calorie counts prn, dietary consult prn.  6. Acute episodic insomnia with situational adjustment disorder:  prn Ambien, monitor for day time sedation. 7. Falls risk elevated:  patient to use call light to get nursing assistance to get up, bed and chair alarm. 8. Elevated DVT risk: progressive activities in PT, continue prophylaxis TANISHA hose, elevation and see mar  . 9. Complex discharge planning:  Weekly team meeting every Monday to assess progress towards goals, discuss and address social, psychological and medical comorbidities and to address difficulties they may be having progressing in therapy. Patient and family education is in progress. The patient is to follow-up with their family physician after discharge.         Complex Active General Medical Issues that complicate care Assess & Plan:    Patient Active Problem List   Diagnosis    Severe major depression with psychotic features ([address]    Type 2 diabetes mellitus with hyperglycemia, with long-term current use of insulin ([address]    HTN (hypertension)    HLD (hyperlipidemia)    Hypothyroid    [facility] failure) [facility]    Non-pressure ulcer of toe ([facility])    Atherosclerotic PVD with intermittent claudication ([facility])    Acute osteomyelitis ([address]    Skin infection    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    Surgical wound dehiscence, initial encounter    S/P amputation of lesser toe, right ([facility])    Rectal bleeding    Diabetic ulcer of right foot with bone involvement without evidence of necrosis ([address]    Athscl native arteries of left leg w ulceration oth prt foot ([address]    JESICA (obstructive sleep apnea)    Neuropathy due to secondary diabetes ([address]    Chest pain    PAD (peripheral artery disease) ([facility])    Cellulitis    Syncope and collapse    Severe mitral regurgitation    Ischemic cardiomyopathy    Pulmonary hypertension (HCC)    Acute on chronic combined systolic and diastolic congestive heart failure (HCC)    Nocturnal hypoxia    RADHA (acute kidney injury) (HCC)    Dizziness    Acute CVA (cerebrovascular accident) [facility]    Gait abnormality    Anxiety    Gastritis, unspecified, without bleeding    Pure hypercholesterolemia    Schizophrenia, unspecified ([facility]    CAD in native artery    Extrapyramidal syndrome    Gangrene of toe of left foot ([facility]    Hypotension due to drugs    Osteomyelitis of right foot ([facility])    Nausea and vomiting   cont therapies  Alison Mackay D.O., PM&R     Attending    [address]

## 2020-05-11 NOTE — PROGRESS NOTES
Gastritis, unspecified, without bleeding 2019    Pure hypercholesterolemia 2019    [facility], unspecified [facility] 2019    Type 2 diabetes mellitus with hyperglycemia, with long-term current use of insulin ([facility] 2019    HTN (hypertension) 2019    HLD (hyperlipidemia) 2019    Hypothyroid 2019    HF (heart failure) ([facility] 2019    Severe major depression with psychotic features ([facility] 2019        Past Medical History:   Diagnosis Date    Asthma     CAD (coronary artery disease)     CKD (chronic kidney disease) stage 3, GFR 30-59 ml/min ([facility])     Colitis     Diabetes mellitus ([facility]     Hyperlipidemia     Hypertension     PAD (peripheral artery disease) ([facility])     Prolonged emergence from general anesthesia     PVD (peripheral vascular disease) [facility]     Thyroid disease      Past Surgical History:   Procedure Laterality Date    CARDIAC SURGERY       SECTION      COLONOSCOPY N/A 2019    COLONOSCOPY DIAGNOSTIC performed by Wendy Henderson MD at [address] [place] [address] GRAFT  2019    unknown vessels    HYSTERECTOMY      TOE AMPUTATION Right     3rd toe          Restrictions  Restrictions/Precautions: Fall Risk    SUBJECTIVE   Subjective  Subjective: I have an ulcer on my L heel. Pre-Session Pain Report  Pre Treatment Pain Screening  Pain at present: 2  Intervention List: Patient able to continue with treatment          Post-Session Pain Report  Pain Assessment  Pain Assessment: 0-10  Pain Level: 2  Pain Type: Acute pain  Pain Location: Foot  Pain Orientation: Left  Pain Descriptors: Aching         OBJECTIVE        Bed mobility  Supine to Sit: Supervision  Sit to Supine: Supervision  Comment: increased time and effort to complete; otherwise steady    Transfers  Sit to Stand: Supervision  Stand to sit: Supervision  Bed to Chair: Supervision  Comment: vc's for hand placement and technique; fair (-) carryover.

## 2020-05-11 NOTE — DISCHARGE SUMMARY
to ER or call 911 if you develop any significant signs or symptoms.     I may not have addressed all of your medical illnesses or the abnormal blood work or imaging therefore please ask your PCP, Aliyah Mota ,  to obtain [facility] record to follow up on all of the abnormal labs, imaging and findings that I have and have not addressed during your hospitalization.      Discharging you from the hospital does not mean that your medical care ends here and now. You may still need additional work up, investigation, monitoring, and treatment to be handled from this point on by outside providers including your PCP, Aliyah Mota , Specialists and other healthcare providers.      Please review your list of discharge medications prior to resuming medications you might still have at home, as the medications you need to be taking, dosages or how often you must take them may have changed. For medication questions, contact your retail pharmacy and your PCP, Aliyah Mota .     ** I STRONGLY RECOMMEND that you follow up with Aliyah Mota within 3 to 5 days for a post hospitalization evaluation. This specific office visit is covered by your insurance, and is not the same as your annual doctor visit/ check up. This office visit is important, as it may prevent need for repeat and/or future hospitalizations. **    Your medical team at [facility] ([facility]) appreciates the opportunity to work with you to get well! Sincerely,  Gilbert Michelle Follow up with Dr. Aliyah Mota in the next 7 days or sooner if needed. Please return to ER or call 911 if you develop any significant signs or symptoms.     I may or may not have addressed all of your symptoms, medical issues,  Illnesses, or all of the abnormal blood work or imaging therefore please ask your PCP and other specialists to obtain [facility] record entirely to follow up on all of your symptoms, illnesses, abnormal labs, imaging and findings that I have and have not addressed

## 2020-05-11 NOTE — CARE COORDINATION
SPOKE TO ROSE MARIE, [facility], STILL NO PRECERT AS OF YET BUT SHE HAS SPOKE TO [facility] AND THEY DO THINK THEY SHOULD GET IT TODAY.

## 2020-05-12 VITALS
BODY MASS INDEX: 37.55 KG/M2 | WEIGHT: 225.4 LBS | HEIGHT: 65 IN | RESPIRATION RATE: 18 BRPM | OXYGEN SATURATION: 96 % | SYSTOLIC BLOOD PRESSURE: 120 MMHG | DIASTOLIC BLOOD PRESSURE: 54 MMHG | TEMPERATURE: 98.2 F | HEART RATE: 84 BPM

## 2020-05-12 LAB
GLUCOSE BLD-MCNC: 162 MG/DL (ref 60–115)
GLUCOSE BLD-MCNC: 92 MG/DL (ref 60–115)
PERFORMED ON: ABNORMAL
PERFORMED ON: NORMAL

## 2020-05-12 PROCEDURE — APPSS15 APP SPLIT SHARED TIME 0-15 MINUTES: Performed by: NURSE PRACTITIONER

## 2020-05-12 PROCEDURE — 6360000002 HC RX W HCPCS: Performed by: INTERNAL MEDICINE

## 2020-05-12 PROCEDURE — 99232 SBSQ HOSP IP/OBS MODERATE 35: CPT | Performed by: PSYCHIATRY & NEUROLOGY

## 2020-05-12 PROCEDURE — 99231 SBSQ HOSP IP/OBS SF/LOW 25: CPT | Performed by: PHYSICAL MEDICINE & REHABILITATION

## 2020-05-12 PROCEDURE — 2580000003 HC RX 258: Performed by: INTERNAL MEDICINE

## 2020-05-12 PROCEDURE — 6370000000 HC RX 637 (ALT 250 FOR IP): Performed by: INTERNAL MEDICINE

## 2020-05-12 RX ORDER — INSULIN GLARGINE 100 [IU]/ML
30 INJECTION, SOLUTION SUBCUTANEOUS NIGHTLY
Qty: 1 VIAL | Refills: 3 | Status: ON HOLD | OUTPATIENT
Start: 2020-05-12 | End: 2021-01-01 | Stop reason: HOSPADM

## 2020-05-12 RX ADMIN — ACETAMINOPHEN 500 MG: 500 TABLET ORAL at 14:22

## 2020-05-12 RX ADMIN — Medication 10 ML: at 07:51

## 2020-05-12 RX ADMIN — LEVOTHYROXINE SODIUM 50 MCG: 0.05 TABLET ORAL at 05:10

## 2020-05-12 RX ADMIN — METOPROLOL SUCCINATE 50 MG: 50 TABLET, EXTENDED RELEASE ORAL at 07:52

## 2020-05-12 RX ADMIN — SERTRALINE 200 MG: 100 TABLET, FILM COATED ORAL at 07:52

## 2020-05-12 RX ADMIN — ENOXAPARIN SODIUM 40 MG: 40 INJECTION SUBCUTANEOUS at 07:52

## 2020-05-12 RX ADMIN — ISOSORBIDE DINITRATE 20 MG: 20 TABLET ORAL at 07:52

## 2020-05-12 RX ADMIN — TICAGRELOR 90 MG: 90 TABLET ORAL at 07:52

## 2020-05-12 RX ADMIN — ASPIRIN 81 MG 81 MG: 81 TABLET ORAL at 07:53

## 2020-05-12 RX ADMIN — ISOSORBIDE DINITRATE 20 MG: 20 TABLET ORAL at 14:22

## 2020-05-12 RX ADMIN — CYANOCOBALAMIN TAB 500 MCG 1000 MCG: 500 TAB at 07:52

## 2020-05-12 RX ADMIN — ACETAMINOPHEN 500 MG: 500 TABLET ORAL at 07:53

## 2020-05-12 RX ADMIN — HYDRALAZINE HYDROCHLORIDE 50 MG: 50 TABLET, FILM COATED ORAL at 05:10

## 2020-05-12 RX ADMIN — HYDRALAZINE HYDROCHLORIDE 50 MG: 50 TABLET, FILM COATED ORAL at 14:22

## 2020-05-12 RX ADMIN — FOLIC ACID 1 MG: 1 TABLET ORAL at 07:52

## 2020-05-12 RX ADMIN — GABAPENTIN 400 MG: 400 CAPSULE ORAL at 07:53

## 2020-05-12 RX ADMIN — ATORVASTATIN CALCIUM 80 MG: 80 TABLET, FILM COATED ORAL at 07:53

## 2020-05-12 ASSESSMENT — ENCOUNTER SYMPTOMS
SHORTNESS OF BREATH: 0
CHEST TIGHTNESS: 0
NAUSEA: 0
VOMITING: 0
COLOR CHANGE: 0
WHEEZING: 0
COUGH: 0
TROUBLE SWALLOWING: 0

## 2020-05-12 ASSESSMENT — PAIN SCALES - GENERAL
PAINLEVEL_OUTOF10: 0

## 2020-05-12 NOTE — PROGRESS NOTES
hospital encounter of 05/06/20   CT Head WO Contrast    Narrative EXAMINATION: CT of the brain without contrast    HISTORY: Dizziness. Nausea and vomiting. Altered mental status. COMPARISON: CT brain from April 21, 2020    TECHNIQUE: Multiple contiguous axial images were obtained of the brain from the skull base through the vertex. Multiplanar reformats were obtained. FINDINGS:    Prominence of the sulci and ventricles compatible with mild generalized parenchymal volume loss. Gray-white matter differentiation is preserved. No acute hemorrhage or abnormal extra-axial fluid collection. No mass effect or midline shift. The visualized paranasal sinuses and mastoid air cells are clear. Calvarium is intact. Impression No acute intracranial process. All CT scans at this facility use dose modulation, iterative reconstruction, and/or weight based dosing when appropriate to reduce radiation dose to as low as reasonably achievable. No results found for this or any previous visit. No results found for this or any previous visit. Carotid duplex: No results found for this or any previous visit. No results found for this or any previous visit.   Results for orders placed during the hospital encounter of 04/21/20   US CAROTID ARTERY BILATERAL    Narrative BILATERAL DUPLEX CAROTID ULTRASOUND    CLINICAL INFORMATION:  SYNCOPE    COMPARISON: Ultrasound carotid artery from May 26, 2019    FINDINGS:  The bilateral carotid duplex ultrasound study demonstrates the following:                                                                        RIGHT            LEFT      Proximal common carotid artery           85 cm/s            110 cm/s    Mid common carotid artery                   66 cm/s            57 cm/s    Distal common carotid artery                78 cm/s           86 cm/s  Proximal internal carotid artery             104 cm/s            151 cm/s  Mid internal carotid artery                      88 cm/s improved  Referral to rehab denied  To [place] home with home health care  Follow-up 4 to 6 weeks    I independently performed an evaluation on this patient. I have reviewed the above documentation completed by the Nurse Practitioner. Please see my additional contributions to the HPI, physical exam, assessment/medical decision making. Ced Youssef MD, [id number], American Board of Psychiatry & Neurology  Board Certified in Vascular Neurology  Board Certified in Neuromuscular Medicine  Certified in Neurorehabilitation       Collaborating physicians: Dr Isela Youssef    Electronically signed by ESDRAS Vaughan CNP on 5/12/2020 at 2:47 PM

## 2020-05-12 NOTE — PROGRESS NOTES
Subjective: The patient complains of moderate to severe  acute on chronic progressive fatigue and dizziness partially relieved by rest, PT, OT and exacerbated by recent right frontal lobe CVA. I am concerned about patients falls risk due to her cognitive deficits. He is looking forward to coming to rehab today. ROS x10: The patient also complains of severely impaired mobility and activities of daily living. Otherwise no new problems with vision, hearing, nose, mouth, throat, dermal, cardiovascular, GI, , pulmonary, musculoskeletal, psychiatric or neurological. See Rehab H&P on Rehab chart dated . Vital signs:  /61   Pulse 69   Temp 98.2 °F (36.8 °C) (Oral)   Resp 18   Ht 5' 5\" (1.651 m)   Wt 225 lb 6.4 oz (102.2 kg)   SpO2 95%   BMI 37.51 kg/m²   I/O:   PO/Intake:    fair PO intake, no problems observed or reported. Bowel/Bladder:  continent, no problems noted. General:  Patient is well developed, adequately nourished, non-obese and     well kempt. HEENT:    PERRLA, hearing intact to loud voice, external inspection of ear     and nose benign. Inspection of lips, tongue and gums benign  Musculoskeletal: No significant change in strength or tone. All joints stable. Inspection and palpation of digits and nails show no clubbing,       cyanosis or inflammatory conditions. Neuro/Psychiatric: Affect: flat but pleasant. Alert and oriented to person, place and     situation. No significant change in deep tendon reflexes or     sensation  Lungs:  Diminished, CTA-B. Respiration effort is normal at rest.     Heart:   S1 = S2,  RRR. No loud murmurs. Abdomen:  Soft, non-tender, no enlargement of liver or spleen. Extremities:  No significant lower extremity edema or tenderness. Skin:    BUE bruises dt blood draws, no visualized or palpated problems.     Rehabilitation:  Physical therapy: FIMS:  Bed Mobility:      Transfers: Sit to Stand: Supervision  Stand to sit: 02/21/2019    Lisinopril  04/22/2020    Lyrica [pregabalin]  11/20/2019    Navane [thiothixene]  02/21/2019    Pamelor [nortriptyline hcl]  02/21/2019    Remeron [mirtazapine]  02/21/2019    Risperdal [risperidone]  02/21/2019    Trazodone and nefazodone  02/21/2019    Wellbutrin [bupropion]  02/21/2019      (please also verify by checking [facility])     Complex Physical Medicine & Rehab Issues Assess & Plan:   1. Severe abnormality of gait and mobility and impaired self-care and ADL's secondary to  Acute right frontal CVA . Functional and medical status reassessed regarding patients ability to participate in therapies and patient found to be able to participate in  acute intensive comprehensive inpatient rehabilitation program including PT/OT to improve balance, ambulation, ADLs, and to improve the P/AROM. It is my opinion that they will be able to tolerate 3 hours of therapy a day and benefit from it at an acute level. 2. Bowel and Bladder dysfunction:  frequent toileting, ambulate to bathroom with assistance, check post void residuals. Check for C.difficile x1 if >2 loose stools in 24 hours, continue bowel & bladder program.   3. Moderate mid and low back and generalized OA pain: reassess pain every shift and prior to and after each therapy session, give prn  Tylenol, modalities prn in therapy, consider Lidoderm, K-pad prn.   4. Skin breakdown risk:  continue pressure relief program.  Daily skin exams and reports from nursing. 5. Severe fatigue due to immobility and nutritional deficits: Add vitamin B12 vitamin D and CoQ10 titrate dosing and add protein supplementation with low carb content. 6. Complex discharge planning:   I anticipate patient will come to acute rehab today pending care source approval.  I will put in her discharge rehab medication reconciliation for rehab orders. Complex Active General Medical Issues that complicate care Assess & Plan:     1.  Active Problems:    Severe major

## 2020-05-12 NOTE — PROGRESS NOTES
Patient is doing well. Patient is doing and performing beyond expectation. Her insurance company declined to approve her going to the rehab unit. Patient is able to walk in the room. Patient denies any chest pain, abdominal pain, nausea, vomiting, diarrhea, dysuria or hematuria. ROS: 12 system review otherwise is negative for acute signs or symptoms over the last 24 hrs. Exam: Awake, alert oriented, in no apparent distress  HEENT: Pink conjunctiva and buccal mucosa. Normal and throat and nose  Neck: Supple, no nuchal rigidity. Endo: No thyromegaly. Vascular: No JVD or carotid bruit. Chest: Clear to auscultation, no dullness to percussion. Heart: Regular rate and rhythm, no extra sounds. No murmur, no rub. Abdomen: Soft, no tenderness, no rebound, no rigidity. Clinically I could not exclude the possibility of intra-abdominal mass or organomegaly. LE: No cyanosis or clubbing, no varices or edema. Neuro: Awake, alert, oriented, normal speech, normal comprehension, normal extension, normal cranial nerves, normal and symmetrical motor and tone examination throughout. MS: No joint effusion or tenderness. Skin: No skin rash, itching, bruising or significant findings. Assessment and plan: This documentation may or may not be complete, inclusive or conclusive. *Acute right frontal cerebrovascular accident.     *Hypertension.     *Type 2 diabetes mellitus.     *Obesity.     *Cardiomyopathy. Ejection fraction 45%. Chronic systolic and diastolic heart failure. Patient is euvolemic at this time.     *Atrial regurgitation.     *Depression and anxiety.     *Hypothyroidism. Plan:  Patient is doing very well beyond the expectation. Her insurance company declined the disposition to rehab. Patient will be going home with home health care      I may or may not have addressed all of this pt symptoms, medical issues, abnormal labs and findings.  Pt will need additional work up, investigation,

## 2020-05-13 ENCOUNTER — CARE COORDINATION (OUTPATIENT)
Dept: CASE MANAGEMENT | Age: 65
End: 2020-05-13

## 2020-05-13 NOTE — CARE COORDINATION
Roxy [facility] Transitions Initial Follow Up Call    Call within 2 business days of discharge: Yes    Patient: Fabiana Contreras Patient : 1955   MRN: [id number]  Reason for Admission: -2020 [address] IP R Frontal CVA  Discharge Date: 20 RARS: Readmission Risk Score: 31  Risk 52%  RA  TCM appt 2020 11:45    Spoke / [facility] nurse, Joe Keenan. Pt/family predominantly [place]ian speaking and verbally approved to this CTN. Patient contacted regarding Ronald Wayne. Care Transition Nurse/ Ambulatory Care Manager contacted the patient by telephone to perform post discharge assessment. Verified name and  with patient as identifiers. Provided introduction to self, and explanation of the CTN/ACM role, and reason for call due to risk factors for infection and/or exposure to COVID-19. Symptoms reviewed with patient who verbalized the following symptoms:       Due to no new or worsening symptoms encounter was not routed to provider for escalation. Patient has following risk factors of: Recent hospital admission, New CVA, CHF, JESICA. CTN/ACM reviewed discharge instructions, medical action plan and red flags such as increased shortness of breath, increasing fever and signs of decompensation with [facility] who verbalized understanding. Discussed exposure protocols and quarantine with CDC Guidelines What to do if you are sick with coronavirus disease 2019. [facility] [place] [address] Nurse was given an opportunity for questions and concerns. The Family agrees to contact the [facility] exposure line [phone], local health department [place] [facility]: ([phone]) and PCP office for questions related to their healthcare. CTN/ACM provided contact information for future needs.     Reviewed and educated [place] [address] Nurse on any new and changed medications related to discharge diagnosis     Patient/family/caregiver given information for [facility] Ambrose and agrees to enroll   Patient's preferred e-mail: Patient's preferred phone number:   Based on Loop alert triggers, patient will be contacted by nurse care manager for worsening symptoms. Plan for follow-up call in 5-7 days based on severity of symptoms and risk factors. Denies fever, chills, cough, or flu-like symptoms. Reports edema improved. Has c/o SOB w/ moderate exertion. [facility] Nurse providing stroke and heart failure education. Teaching how to take home BPs. Was provided masks and [place]ian literature concerning CV-19 symptoms to report and precautions to follow. Advance Care Planning  People with COVID-19 may have no symptoms, mild symptoms, such as fever, cough, and shortness of breath or they may have more severe illness, developing severe and fatal pneumonia. As a result, Advance Care Planning with attention to naming a health care decision maker (someone you trust to make healthcare decisions for you if you could not speak for yourself) and sharing other health care preferences is important BEFORE a possible health crisis. Please contact your Primary Care Provider to discuss Advance Care Planning. Preventing the Spread of Coronavirus Disease 2019 in Homes and Residential Communities  For the most recent information go to [email or web address]    Prevention steps for People with confirmed or suspected COVID-19 (including persons under investigation) who do not need to be hospitalized  and   People with confirmed COVID-19 who were hospitalized and determined to be medically stable to go home    Your healthcare provider and public health staff will evaluate whether you can be cared for at home. If it is determined that you do not need to be hospitalized and can be isolated at home, you will be monitored by staff from your local or state health department.  You should follow the prevention steps below until a healthcare provider or local or state health department says you can return to enter your room. Cover your coughs and sneezes  Cover your mouth and nose with a tissue when you cough or sneeze. Throw used tissues in a lined trash can. Immediately wash your hands with soap and water for at least 20 seconds or, if soap and water are not available, clean your hands with an alcohol-based hand  that contains at least 60% alcohol. Clean your hands often  Wash your hands often with soap and water for at least 20 seconds, especially after blowing your nose, coughing, or sneezing; going to the bathroom; and before eating or preparing food. If soap and water are not readily available, use an alcohol-based hand  with at least 60% alcohol, covering all surfaces of your hands and rubbing them together until they feel dry. Soap and water are the best option if hands are visibly dirty. Avoid touching your eyes, nose, and mouth with unwashed hands. Avoid sharing personal household items  You should not share dishes, drinking glasses, cups, eating utensils, towels, or bedding with other people or pets in your home. After using these items, they should be washed thoroughly with soap and water. Clean all high-touch surfaces everyday  High touch surfaces include counters, tabletops, doorknobs, bathroom fixtures, toilets, phones, keyboards, tablets, and bedside tables. Also, clean any surfaces that may have blood, stool, or body fluids on them. Use a household cleaning spray or wipe, according to the label instructions. Labels contain instructions for safe and effective use of the cleaning product including precautions you should take when applying the product, such as wearing gloves and making sure you have good ventilation during use of the product. Monitor your symptoms  Seek prompt medical attention if your illness is worsening (e.g., difficulty breathing). Before seeking care, call your healthcare provider and tell them that you have, or are being evaluated for, COVID-19.  Put on a

## 2020-05-15 ENCOUNTER — VIRTUAL VISIT (OUTPATIENT)
Dept: PULMONOLOGY | Age: 65
End: 2020-05-15
Payer: COMMERCIAL

## 2020-05-15 PROBLEM — J45.20 MILD INTERMITTENT ASTHMA WITHOUT COMPLICATION: Status: ACTIVE | Noted: 2020-05-15

## 2020-05-15 PROCEDURE — G8428 CUR MEDS NOT DOCUMENT: HCPCS | Performed by: INTERNAL MEDICINE

## 2020-05-15 PROCEDURE — 1111F DSCHRG MED/CURRENT MED MERGE: CPT | Performed by: INTERNAL MEDICINE

## 2020-05-15 PROCEDURE — 3017F COLORECTAL CA SCREEN DOC REV: CPT | Performed by: INTERNAL MEDICINE

## 2020-05-15 PROCEDURE — 99214 OFFICE O/P EST MOD 30 MIN: CPT | Performed by: INTERNAL MEDICINE

## 2020-05-15 ASSESSMENT — ENCOUNTER SYMPTOMS
COUGH: 1
RHINORRHEA: 0
CHEST TIGHTNESS: 0
SINUS PRESSURE: 0
EYE DISCHARGE: 0
SHORTNESS OF BREATH: 1
VOICE CHANGE: 0
VOMITING: 0
WHEEZING: 1
TROUBLE SWALLOWING: 0
DIARRHEA: 0
NAUSEA: 0
SORE THROAT: 0
ABDOMINAL PAIN: 0
EYE ITCHING: 0

## 2020-05-15 NOTE — PROGRESS NOTES
vial Inject 30 Units into the skin nightly  Autumn Trejo MD   insulin lispro (HUMALOG) 100 UNIT/ML injection vial Inject 6 Units into the skin 3 times daily (before meals)  Autumn Trejo MD   isosorbide dinitrate (ISORDIL) 20 MG tablet Take 1 tablet by mouth 3 times daily  Alison Nicholson MD   nitroGLYCERIN (NITROSTAT) 0.4 MG SL tablet up to max of 3 total doses. If no relief after 1 dose, call 911. Alison Nicholson MD   hydrALAZINE (APRESOLINE) 50 MG tablet Take 1 tablet by mouth every 8 hours  Alison Nicholson MD   atorvastatin (LIPITOR) 40 MG tablet Take 2 tablets by mouth daily  Alison Nicholson MD   metoprolol succinate (TOPROL XL) 50 MG extended release tablet Take 1 tablet by mouth 2 times daily  Alisno Nicholson MD   ticagrelor (BRILINTA) 90 MG TABS tablet Take 1 tablet by mouth 2 times daily  Alison Nicholson MD   mupirocin (BACTROBAN) 2 % ointment Apply topically 3 times daily Apply topically 3 times daily to affected area  Historical Provider, MD   amitriptyline (ELAVIL) 25 MG tablet Take 25 mg by mouth nightly  Historical Provider, MD   albuterol sulfate HFA (VENTOLIN HFA) 108 (90 Base) MCG/ACT inhaler Inhale 2 puffs into the lungs as needed for Wheezing Every 4-6 hours PRN  Historical Provider, MD   acetaminophen (TYLENOL) 500 MG tablet Take 500 mg by mouth 3 times daily Take with Tramadol  Historical Provider, MD   ALPRAZolam (XANAX) 1 MG tablet Take 2 mg by mouth nightly as needed for Sleep. Historical Provider, MD   gabapentin (NEURONTIN) 400 MG capsule Take 400 mg by mouth 2 times daily. AM and afternoon  Historical Provider, MD   sertraline (ZOLOFT) 50 MG tablet Take 4 tablets by mouth daily  Candis Villegas DO   haloperidol (HALDOL) 5 MG tablet Take 5 mg by mouth daily  Historical Provider, MD   FreeStyle Lancets MISC 1 each by Does not apply route daily  Adair Campos MD   blood glucose test strips (FREESTYLE LITE) strip 1 each by In Vitro route daily As needed. Rima Rashid MD   Alcohol Swabs (ALCOHOL PREP) 70 % PADS qid  Patient taking differently: Apply 1 each topically 4 times daily qid  Rima Rashid MD   Insulin Pen Needle (NOVOFINE) 32G X 6 MM MISC Qid  Patient taking differently: 1 each 3 times daily Qid  Rima Rashid MD   aspirin 81 MG chewable tablet Take 81 mg by mouth daily  Historical Provider, MD   folic acid (FOLVITE) 1 MG tablet Take 1 mg by mouth daily  Historical Provider, MD   Iron Polysacch [id number] (NIFEREX-150 FORTE PO) Take 1 tablet by mouth daily   Historical Provider, MD   Cyanocobalamin (VITAMIN B-12) 1000 MCG extended release tablet Take 1,000 mcg by mouth daily  Historical Provider, MD   Cholecalciferol (VITAMIN D3) 00330 units CAPS Take 1 capsule by mouth once a week Indications: weekly on Sun   Historical Provider, MD   Cholecalciferol (VITAMIN D) 2000 units CAPS capsule Take 2,000 Units by mouth daily   Historical Provider, MD   insulin lispro (HUMALOG) 100 UNIT/ML injection vial Inject 0-6 Units into the skin 3 times daily (with meals)  ESDRAS Jorgensen   levothyroxine (SYNTHROID) 50 MCG tablet Take 50 mcg by mouth Daily  Historical Provider, MD   furosemide (LASIX) 40 MG tablet Take 40 mg by mouth daily Indications: M-W-F   Historical Provider, MD   meclizine (ANTIVERT) 25 MG tablet Take 25 mg by mouth three times daily  Historical Provider, MD       Social History     Tobacco Use    Smoking status: Never Smoker    Smokeless tobacco: Never Used   Substance Use Topics    Alcohol use: Never     Frequency: Never    Drug use: Never        Allergies   Allergen Reactions    Ambien [Zolpidem Tartrate]     Capoten [Captopril]     Clioquinol     Cogentin [Benztropine]     Depakote [Divalproex Sodium]     Effexor Xr [Venlafaxine Hcl Er]     Geodon [Ziprasidone Hcl]     Lisinopril      Hyperkalemia: 4/21/20 potassium was 6.7    Lyrica [Pregabalin]     Navane [Thiothixene]     Pamelor [Nortriptyline Hcl]     Remeron [Mirtazapine]     Risperdal [Risperidone]     Trazodone And Nefazodone     Wellbutrin [Bupropion]        PHYSICAL EXAMINATION:  [ INSTRUCTIONS:  \"[x]\" Indicates a positive item  \"[]\" Indicates a negative item  -- DELETE ALL ITEMS NOT EXAMINED]  Vital Signs: (As obtained by patient/caregiver or practitioner observation)    Blood pressure-  Heart rate-    Respiratory rate-    Temperature-  Pulse oximetry-     Constitutional: [x] Appears well-developed and well-nourished [x] No apparent distress      [] Abnormal-   Mental status  [x] Alert and awake  [x] Oriented to person/place/time [x]Able to follow commands      Eyes:  EOM    [x]  Normal  [] Abnormal-  Sclera  []  Normal  [] Abnormal -         Discharge []  None visible  [] Abnormal -    HENT:   [x] Normocephalic, atraumatic. [] Abnormal   [x] Mouth/Throat: Mucous membranes are moist.     External Ears [] Normal  [] Abnormal-     Neck: [x] No visualized mass     Pulmonary/Chest: [x] Respiratory effort normal.  [x] No visualized signs of difficulty breathing or respiratory distress        [] Abnormal-      Musculoskeletal:   [] Normal gait with no signs of ataxia         [] Normal range of motion of neck        [] Abnormal-       Neurological:        [] No Facial Asymmetry (Cranial nerve 7 motor function) (limited exam to video visit)          [] No gaze palsy        [] Abnormal-         Skin:        [] No significant exanthematous lesions or discoloration noted on facial skin         [] Abnormal-            Psychiatric:       [x] Normal Affect [x] No Hallucinations        [] Abnormal-     Other pertinent observable physical exam findings-     ASSESSMENT/PLAN:  1. Nocturnal hypoxia  She was seen in hospital for possible sleep apnea, pulmonary hypertension. Patient is using 2 lit with sleep but not all the time. I recommend patient to use oxygen every night with sleep. She said she is going to start using every night.     2. JESICA (obstructive sleep apnea)  She

## 2020-05-22 ENCOUNTER — CARE COORDINATION (OUTPATIENT)
Dept: CASE MANAGEMENT | Age: 65
End: 2020-05-22

## 2020-05-22 ENCOUNTER — HOSPITAL ENCOUNTER (OUTPATIENT)
Dept: WOUND CARE | Age: 65
Discharge: HOME | End: 2020-05-22
Payer: COMMERCIAL

## 2020-05-22 VITALS
HEIGHT: 65 IN | HEART RATE: 60 BPM | TEMPERATURE: 98.6 F | WEIGHT: 225 LBS | SYSTOLIC BLOOD PRESSURE: 127 MMHG | DIASTOLIC BLOOD PRESSURE: 67 MMHG | RESPIRATION RATE: 18 BRPM | BODY MASS INDEX: 37.49 KG/M2

## 2020-05-22 PROBLEM — L97.516 DIABETIC ULCER OF RIGHT FOOT WITH BONE INVOLVEMENT WITHOUT EVIDENCE OF NECROSIS (HCC): Status: RESOLVED | Noted: 2019-09-06 | Resolved: 2020-05-22

## 2020-05-22 PROBLEM — E11.621 DIABETIC ULCER OF RIGHT FOOT WITH BONE INVOLVEMENT WITHOUT EVIDENCE OF NECROSIS (HCC): Status: RESOLVED | Noted: 2019-09-06 | Resolved: 2020-05-22

## 2020-05-22 PROCEDURE — 99213 OFFICE O/P EST LOW 20 MIN: CPT

## 2020-05-22 PROCEDURE — 99213 OFFICE O/P EST LOW 20 MIN: CPT | Performed by: SURGERY

## 2020-05-22 ASSESSMENT — PAIN DESCRIPTION - FREQUENCY: FREQUENCY: INTERMITTENT

## 2020-05-22 ASSESSMENT — PAIN SCALES - GENERAL: PAINLEVEL_OUTOF10: 8

## 2020-05-22 ASSESSMENT — PAIN DESCRIPTION - LOCATION: LOCATION: FOOT

## 2020-05-22 ASSESSMENT — PAIN DESCRIPTION - DESCRIPTORS: DESCRIPTORS: SHARP

## 2020-05-22 ASSESSMENT — PAIN DESCRIPTION - ORIENTATION: ORIENTATION: LEFT

## 2020-05-22 NOTE — PROGRESS NOTES
MEDICATIONS    Current Outpatient Medications on File Prior to Encounter   Medication Sig Dispense Refill    insulin glargine (LANTUS) 100 UNIT/ML injection vial Inject 30 Units into the skin nightly 1 vial 3    insulin lispro (HUMALOG) 100 UNIT/ML injection vial Inject 6 Units into the skin 3 times daily (before meals) 1 vial 3    isosorbide dinitrate (ISORDIL) 20 MG tablet Take 1 tablet by mouth 3 times daily 90 tablet 3    nitroGLYCERIN (NITROSTAT) 0.4 MG SL tablet up to max of 3 total doses. If no relief after 1 dose, call 911. 25 tablet 3    hydrALAZINE (APRESOLINE) 50 MG tablet Take 1 tablet by mouth every 8 hours 90 tablet 3    atorvastatin (LIPITOR) 40 MG tablet Take 2 tablets by mouth daily 30 tablet 3    metoprolol succinate (TOPROL XL) 50 MG extended release tablet Take 1 tablet by mouth 2 times daily 30 tablet 3    ticagrelor (BRILINTA) 90 MG TABS tablet Take 1 tablet by mouth 2 times daily 60 tablet 3    mupirocin (BACTROBAN) 2 % ointment Apply topically 3 times daily Apply topically 3 times daily to affected area      amitriptyline (ELAVIL) 25 MG tablet Take 25 mg by mouth nightly      albuterol sulfate HFA (VENTOLIN HFA) 108 (90 Base) MCG/ACT inhaler Inhale 2 puffs into the lungs as needed for Wheezing Every 4-6 hours PRN      acetaminophen (TYLENOL) 500 MG tablet Take 500 mg by mouth 3 times daily Take with Tramadol      gabapentin (NEURONTIN) 400 MG capsule Take 400 mg by mouth 2 times daily. AM and afternoon      sertraline (ZOLOFT) 50 MG tablet Take 4 tablets by mouth daily      haloperidol (HALDOL) 5 MG tablet Take 5 mg by mouth daily      FreeStyle Lancets MISC 1 each by Does not apply route daily 100 each 3    blood glucose test strips (FREESTYLE LITE) strip 1 each by In Vitro route daily As needed.  100 each 3    Alcohol Swabs (ALCOHOL PREP) 70 % PADS qid (Patient taking differently: Apply 1 each topically 4 times daily qid) 300 each 06    Insulin Pen Needle (Unique Thomason) Device:  PATIENT TO WEAR OFFLOADING SHOE      Other Instructions:  PATIENT MAY AMBULATE.      Keep all dressings clean, dry and intact.  Keep pressure off the wound(s) at all times.      Follow up visit: 2  Weeks:  December 6 , 2019     Please give 24 hour notice if unable to keep appointment. [phone]     If you experience any of the following, please call the Wound Care Service at  [phone] or go to the nearest emergency room.     *Increase in pain         *Temperature over 101           *Increase in drainage from your wound or a foul odor  *Uncontrolled swelling            *Need for compression bandage changes due to slippage, breakthrough drainage     PLEASE NOTE: IF YOU ARE UNABLE TO OBTAIN WOUND SUPPLIES, CONTINUE TO USE THE SUPPLIES YOU HAVE AVAILABLE UNTIL YOU ARE ABLE TO [address].  IT IS MOST IMPORTANT TO KEEP THE WOUND COVERED AT ALL        Electronically signed by Loida Ramos MD on 5/22/2020 at 4:11 PM.

## 2020-05-22 NOTE — PLAN OF CARE
Problem: Wound:  Goal: Will show signs of wound healing; wound closure and no evidence of infection  Description: Will show signs of wound healing; wound closure and no evidence of infection  Outcome: Ongoing     Problem: Blood Glucose:  Goal: Ability to maintain appropriate glucose levels will improve  Description: Ability to maintain appropriate glucose levels will improve  Outcome: Ongoing

## 2020-05-22 NOTE — CARE COORDINATION
Attempted to reach pt for follow up call. Left message requesting call back.     Shannan Astorga RN  Care Transition Coordinator  [phone]

## 2020-05-29 LAB
ESTIMATED AVERAGE GLUCOSE: 189 MG/DL
HBA1C MFR BLD: 8.2 %

## 2020-06-02 ENCOUNTER — TELEPHONE (OUTPATIENT)
Dept: PULMONOLOGY | Age: 65
End: 2020-06-02

## 2020-06-02 RX ORDER — INSULIN GLARGINE 100 [IU]/ML
INJECTION, SOLUTION SUBCUTANEOUS
Qty: 15 ML | Refills: 3 | Status: ON HOLD | OUTPATIENT
Start: 2020-06-02 | End: 2020-06-28

## 2020-06-09 ENCOUNTER — OFFICE VISIT (OUTPATIENT)
Dept: NEUROLOGY | Age: 65
End: 2020-06-09
Payer: COMMERCIAL

## 2020-06-09 VITALS — SYSTOLIC BLOOD PRESSURE: 138 MMHG | HEART RATE: 71 BPM | DIASTOLIC BLOOD PRESSURE: 63 MMHG

## 2020-06-09 DIAGNOSIS — E87.1 HYPONATREMIA: ICD-10-CM

## 2020-06-09 LAB
ANION GAP SERPL CALCULATED.3IONS-SCNC: 10 MEQ/L (ref 9–15)
BUN BLDV-MCNC: 31 MG/DL (ref 8–23)
CALCIUM SERPL-MCNC: 9.7 MG/DL (ref 8.5–9.9)
CHLORIDE BLD-SCNC: 98 MEQ/L (ref 95–107)
CO2: 26 MEQ/L (ref 20–31)
CREAT SERPL-MCNC: 1.02 MG/DL (ref 0.5–0.9)
GFR AFRICAN AMERICAN: >60
GFR NON-AFRICAN AMERICAN: 54.8
GLUCOSE BLD-MCNC: 197 MG/DL (ref 70–99)
POTASSIUM SERPL-SCNC: 4.4 MEQ/L (ref 3.4–4.9)
SODIUM BLD-SCNC: 134 MEQ/L (ref 135–144)

## 2020-06-09 PROCEDURE — 1036F TOBACCO NON-USER: CPT | Performed by: NURSE PRACTITIONER

## 2020-06-09 PROCEDURE — 3017F COLORECTAL CA SCREEN DOC REV: CPT | Performed by: NURSE PRACTITIONER

## 2020-06-09 PROCEDURE — G8427 DOCREV CUR MEDS BY ELIG CLIN: HCPCS | Performed by: NURSE PRACTITIONER

## 2020-06-09 PROCEDURE — 99215 OFFICE O/P EST HI 40 MIN: CPT | Performed by: NURSE PRACTITIONER

## 2020-06-09 PROCEDURE — G8417 CALC BMI ABV UP PARAM F/U: HCPCS | Performed by: NURSE PRACTITIONER

## 2020-06-09 PROCEDURE — 1111F DSCHRG MED/CURRENT MED MERGE: CPT | Performed by: NURSE PRACTITIONER

## 2020-06-09 RX ORDER — CLOTRIMAZOLE 1 %
CREAM (GRAM) TOPICAL
Status: ON HOLD | COMMUNITY
Start: 2020-03-26 | End: 2020-09-02

## 2020-06-09 RX ORDER — MELOXICAM 7.5 MG/1
TABLET ORAL
Status: ON HOLD | COMMUNITY
Start: 2020-05-29 | End: 2020-06-28

## 2020-06-09 RX ORDER — MINERAL OIL/UREA/PEG/WATER
LOTION (ML) TOPICAL
COMMUNITY
Start: 2020-03-15

## 2020-06-09 RX ORDER — HYDROXYZINE PAMOATE 25 MG/1
50 CAPSULE ORAL 3 TIMES DAILY PRN
COMMUNITY
Start: 2020-03-04

## 2020-06-09 RX ORDER — PRAZOSIN HYDROCHLORIDE 1 MG/1
CAPSULE ORAL
Status: ON HOLD | COMMUNITY
Start: 2020-05-26 | End: 2020-06-28

## 2020-06-09 ASSESSMENT — ENCOUNTER SYMPTOMS
TROUBLE SWALLOWING: 0
COLOR CHANGE: 0
NAUSEA: 0
DIARRHEA: 0
CHEST TIGHTNESS: 0
SHORTNESS OF BREATH: 0
WHEEZING: 0
COUGH: 0
VOMITING: 0

## 2020-06-09 NOTE — PROGRESS NOTES
fever.   HENT: Negative for hearing loss and trouble swallowing. Eyes: Negative for visual disturbance. Respiratory: Negative for cough, chest tightness, shortness of breath and wheezing. Cardiovascular: Positive for leg swelling. Negative for chest pain and palpitations. Gastrointestinal: Negative for diarrhea, nausea and vomiting. Musculoskeletal: Positive for gait problem. Skin: Negative for color change and rash. Neurological: Negative for dizziness, tremors, seizures, syncope, facial asymmetry, speech difficulty, weakness, light-headedness, numbness and headaches. Psychiatric/Behavioral: Negative for agitation, confusion and hallucinations. The patient is not nervous/anxious. Objective:   /63 (Site: Right Upper Arm, Position: Sitting, Cuff Size: Medium Adult)   Pulse 71     Physical Exam  Vitals signs reviewed. Constitutional:       General: She is not in acute distress. Appearance: She is obese. She is not diaphoretic. HENT:      Head: Normocephalic and atraumatic. Eyes:      General: No visual field deficit. Extraocular Movements: Extraocular movements intact. Pupils: Pupils are equal, round, and reactive to light. Cardiovascular:      Rate and Rhythm: Normal rate and regular rhythm. Pulmonary:      Effort: Pulmonary effort is normal. No respiratory distress. Breath sounds: Normal breath sounds. Abdominal:      General: Bowel sounds are normal. There is no distension. Palpations: Abdomen is soft. Tenderness: There is no abdominal tenderness. Skin:     General: Skin is warm and dry. Neurological:      General: No focal deficit present. Mental Status: She is alert and oriented to person, place, and time. Cranial Nerves: No cranial nerve deficit, dysarthria or facial asymmetry. Motor: No tremor or seizure activity. Coordination: Finger-Nose-Finger Test normal.      Gait: Gait abnormal.         No results found.     Lab Results   Component Value Date    WBC 8.4 05/07/2020    RBC 4.75 05/07/2020    HGB 12.1 05/07/2020    HCT 36.3 05/07/2020    MCV 76.4 05/07/2020    MCH 25.4 05/07/2020    MCHC 33.2 05/07/2020    RDW 17.6 05/07/2020     05/07/2020     Lab Results   Component Value Date     05/07/2020    K 4.6 05/07/2020     05/07/2020    CO2 23 05/07/2020    BUN 18 05/07/2020    CREATININE 1.06 05/07/2020    GFRAA >60.0 05/07/2020    LABGLOM 52.4 05/07/2020    GLUCOSE 146 05/07/2020    GLUCOSE 279 01/06/2020    PROT 8.2 05/06/2020    LABALBU 4.0 05/06/2020    LABALBU <7.0 01/06/2020    CALCIUM 8.9 05/07/2020    BILITOT 0.5 05/06/2020    ALKPHOS 102 05/06/2020    AST 51 05/06/2020    ALT 26 05/06/2020     Lab Results   Component Value Date    PROTIME 13.7 05/06/2020    INR 1.0 05/06/2020     Lab Results   Component Value Date    TSH 1.910 05/06/2020    IRON 126 02/12/2020    TIBC 295 02/12/2020     Lab Results   Component Value Date    TRIG 113 05/07/2020    HDL 47 05/07/2020    LDLCALC 66 05/07/2020     Lab Results   Component Value Date    LABAMPH Neg 04/21/2020    BARBSCNU Neg 04/21/2020    LABBENZ POSITIVE 04/21/2020    LABMETH Neg 04/21/2020    OPIATESCREENURINE Neg 04/21/2020    PHENCYCLIDINESCREENURINE Neg 04/21/2020    ETOH <10 04/21/2020     No results found for: LITHIUM, DILFRTOT, VALPROATE    Assessment and Plan:      1.  Acute CVA (cerebrovascular accident) ([facility] [address]  - Acute right frontal CVA, likely representing a blood vessel more than acute stroke  - Dizziness which is multifactorial related to underlying multiple medications and her cardiorespiratory dysfunction, now resolved, patient has been initiated on oxygen therapy at night and is considering outpatient sleep study  - Hyponatremia, resolved, repeat BMP  - Patient was on DAPT at time of presentation, will continue the same  - Carotid duplex was done 4/21/2020 which showed 50 to 69% stenosis of the left internal carotid artery and less than 50% of

## 2020-06-15 ENCOUNTER — APPOINTMENT (OUTPATIENT)
Dept: GENERAL RADIOLOGY | Age: 65
End: 2020-06-15
Payer: COMMERCIAL

## 2020-06-15 ENCOUNTER — HOSPITAL ENCOUNTER (EMERGENCY)
Age: 65
Discharge: HOME | End: 2020-06-15
Payer: COMMERCIAL

## 2020-06-15 VITALS
OXYGEN SATURATION: 100 % | SYSTOLIC BLOOD PRESSURE: 171 MMHG | DIASTOLIC BLOOD PRESSURE: 76 MMHG | WEIGHT: 231 LBS | HEIGHT: 64 IN | RESPIRATION RATE: 18 BRPM | TEMPERATURE: 98.2 F | BODY MASS INDEX: 39.44 KG/M2 | HEART RATE: 75 BPM

## 2020-06-15 LAB
ALBUMIN SERPL-MCNC: 3.8 G/DL (ref 3.5–4.6)
ALP BLD-CCNC: 119 U/L (ref 40–130)
ALT SERPL-CCNC: 61 U/L (ref 0–33)
ANION GAP SERPL CALCULATED.3IONS-SCNC: 10 MEQ/L (ref 9–15)
AST SERPL-CCNC: 67 U/L (ref 0–35)
BASOPHILS ABSOLUTE: 0.1 K/UL (ref 0–0.2)
BASOPHILS RELATIVE PERCENT: 1 %
BILIRUB SERPL-MCNC: 0.6 MG/DL (ref 0.2–0.7)
BUN BLDV-MCNC: 32 MG/DL (ref 8–23)
CALCIUM SERPL-MCNC: 10 MG/DL (ref 8.5–9.9)
CHLORIDE BLD-SCNC: 97 MEQ/L (ref 95–107)
CO2: 27 MEQ/L (ref 20–31)
CREAT SERPL-MCNC: 1.03 MG/DL (ref 0.5–0.9)
EKG ATRIAL RATE: 68 BPM
EKG P AXIS: 54 DEGREES
EKG P-R INTERVAL: 172 MS
EKG Q-T INTERVAL: 428 MS
EKG QRS DURATION: 94 MS
EKG QTC CALCULATION (BAZETT): 455 MS
EKG R AXIS: 70 DEGREES
EKG T AXIS: -77 DEGREES
EKG VENTRICULAR RATE: 68 BPM
EOSINOPHILS ABSOLUTE: 0.2 K/UL (ref 0–0.7)
EOSINOPHILS RELATIVE PERCENT: 2.1 %
GFR AFRICAN AMERICAN: >60
GFR NON-AFRICAN AMERICAN: 54.2
GLOBULIN: 4.2 G/DL (ref 2.3–3.5)
GLUCOSE BLD-MCNC: 208 MG/DL (ref 60–115)
GLUCOSE BLD-MCNC: 208 MG/DL (ref 70–99)
HCT VFR BLD CALC: 34.9 % (ref 37–47)
HEMOGLOBIN: 11.7 G/DL (ref 12–16)
HYPOCHROMIA: ABNORMAL
LYMPHOCYTES ABSOLUTE: 1.9 K/UL (ref 1–4.8)
LYMPHOCYTES RELATIVE PERCENT: 19.1 %
MAGNESIUM: 2.2 MG/DL (ref 1.7–2.4)
MCH RBC QN AUTO: 24.9 PG (ref 27–31.3)
MCHC RBC AUTO-ENTMCNC: 33.6 % (ref 33–37)
MCV RBC AUTO: 73.9 FL (ref 82–100)
MICROCYTES: ABNORMAL
MONOCYTES ABSOLUTE: 0.8 K/UL (ref 0.2–0.8)
MONOCYTES RELATIVE PERCENT: 7.7 %
NEUTROPHILS ABSOLUTE: 7 K/UL (ref 1.4–6.5)
NEUTROPHILS RELATIVE PERCENT: 70.1 %
PDW BLD-RTO: 16.5 % (ref 11.5–14.5)
PERFORMED ON: ABNORMAL
PLATELET # BLD: 213 K/UL (ref 130–400)
POTASSIUM SERPL-SCNC: 5.2 MEQ/L (ref 3.4–4.9)
RBC # BLD: 4.73 M/UL (ref 4.2–5.4)
SODIUM BLD-SCNC: 134 MEQ/L (ref 135–144)
TOTAL PROTEIN: 8 G/DL (ref 6.3–8)
TROPONIN: <0.01 NG/ML (ref 0–0.01)
WBC # BLD: 10 K/UL (ref 4.8–10.8)

## 2020-06-15 PROCEDURE — 83735 ASSAY OF MAGNESIUM: CPT

## 2020-06-15 PROCEDURE — 84484 ASSAY OF TROPONIN QUANT: CPT

## 2020-06-15 PROCEDURE — 80053 COMPREHEN METABOLIC PANEL: CPT

## 2020-06-15 PROCEDURE — 93005 ELECTROCARDIOGRAM TRACING: CPT | Performed by: NURSE PRACTITIONER

## 2020-06-15 PROCEDURE — 99285 EMERGENCY DEPT VISIT HI MDM: CPT

## 2020-06-15 PROCEDURE — 71045 X-RAY EXAM CHEST 1 VIEW: CPT

## 2020-06-15 PROCEDURE — 36415 COLL VENOUS BLD VENIPUNCTURE: CPT

## 2020-06-15 PROCEDURE — 85025 COMPLETE CBC W/AUTO DIFF WBC: CPT

## 2020-06-15 RX ORDER — KETOROLAC TROMETHAMINE 30 MG/ML
30 INJECTION, SOLUTION INTRAMUSCULAR; INTRAVENOUS ONCE
Status: DISCONTINUED | OUTPATIENT
Start: 2020-06-15 | End: 2020-06-15 | Stop reason: HOSPADM

## 2020-06-15 RX ORDER — OMEPRAZOLE 20 MG/1
20 CAPSULE, DELAYED RELEASE ORAL
Qty: 30 CAPSULE | Refills: 0 | Status: ON HOLD | OUTPATIENT
Start: 2020-06-15 | End: 2020-06-28

## 2020-06-15 ASSESSMENT — ENCOUNTER SYMPTOMS
TROUBLE SWALLOWING: 0
DIARRHEA: 0
ABDOMINAL PAIN: 1
COLOR CHANGE: 0
BLOOD IN STOOL: 0
VOMITING: 0
EYE DISCHARGE: 0
COUGH: 0
CONSTIPATION: 0
BACK PAIN: 0
WHEEZING: 0
NAUSEA: 0
EYE REDNESS: 0
SHORTNESS OF BREATH: 1
SORE THROAT: 0
EYE PAIN: 0
RHINORRHEA: 0

## 2020-06-15 ASSESSMENT — PAIN SCALES - GENERAL: PAINLEVEL_OUTOF10: 5

## 2020-06-15 ASSESSMENT — PAIN DESCRIPTION - DESCRIPTORS: DESCRIPTORS: PRESSURE

## 2020-06-15 ASSESSMENT — PAIN DESCRIPTION - PAIN TYPE: TYPE: ACUTE PAIN

## 2020-06-15 ASSESSMENT — PAIN DESCRIPTION - LOCATION: LOCATION: CHEST

## 2020-06-15 ASSESSMENT — PAIN DESCRIPTION - ORIENTATION: ORIENTATION: MID

## 2020-06-15 ASSESSMENT — PAIN DESCRIPTION - FREQUENCY: FREQUENCY: CONTINUOUS

## 2020-06-15 NOTE — ED NOTES
Bed: 15  Expected date:   Expected time:   Means of arrival:   Comments:     Mehdi Lara, SALMA  06/15/20 [id number]

## 2020-06-15 NOTE — ED PROVIDER NOTES
[facility] ED  EMERGENCY DEPARTMENT ENCOUNTER      Pt Name: Soheila Valdes  MRN: [id number]  [place] 1955  Date of evaluation: 6/15/2020  Provider: ESDRAS Sharma CNP    CHIEF COMPLAINT       Chief Complaint   Patient presents with    Chest Pain     since last night         HISTORY OF PRESENT ILLNESS   (Location/Symptom, Timing/Onset,Context/Setting, Quality, Duration, Modifying Factors, Severity)  Note limiting factors. Soheila Valdes is a 60 y.o. female who presents to the emergency department with a chart reviewed past medical history of hypertension, hyperlipidemia, hypothyroidism, heart failure action, osteomyelitis, and obstructive sleep apnea for chief complaint of mild 5 out of 10 shortness of breath that is been ongoing for the past 2 weeks and is accompanied by mild chest pain. Patient reports that the pain is in the epigastric region and she is pointing to the epigastric region of her abdomen but she is saying the words chest.  She states that it feels as a burning sensation. She reports is worse with food. She denies any alleviating or modifying factors at this time. Nursing Notes were reviewed. REVIEW OF SYSTEMS    (2-9 systems for level 4, 10 or more for level 5)     Review of Systems   Constitutional: Negative for activity change, appetite change, fatigue and fever. HENT: Negative for congestion, ear pain, rhinorrhea, sore throat and trouble swallowing. Eyes: Negative for pain, discharge and redness. Respiratory: Positive for shortness of breath. Negative for cough and wheezing. Cardiovascular: Positive for chest pain. Negative for palpitations. Gastrointestinal: Positive for abdominal pain. Negative for blood in stool, constipation, diarrhea, nausea and vomiting. Endocrine: Negative for polydipsia and polyuria. Genitourinary: Negative for decreased urine volume, dysuria, flank pain and hematuria.    Musculoskeletal: Negative for General: She is not in acute distress. Appearance: She is well-developed. She is not diaphoretic. HENT:      Head: Normocephalic and atraumatic. Nose: Nose normal.   Eyes:      Conjunctiva/sclera: Conjunctivae normal.      Pupils: Pupils are equal, round, and reactive to light. Neck:      Musculoskeletal: Normal range of motion and neck supple. Cardiovascular:      Rate and Rhythm: Normal rate and regular rhythm. Heart sounds: Normal heart sounds. Pulmonary:      Effort: Pulmonary effort is normal. No respiratory distress. Breath sounds: Normal breath sounds. No wheezing. Abdominal:      General: Bowel sounds are normal.      Palpations: Abdomen is soft. Tenderness: There is no abdominal tenderness. Skin:     General: Skin is warm and dry. Capillary Refill: Capillary refill takes less than 2 seconds. Findings: No rash. Neurological:      Mental Status: She is alert and oriented to person, place, and time. Cranial Nerves: No cranial nerve deficit. Psychiatric:         Behavior: Behavior normal.         RESULTS     EKG: All EKG's are interpreted by the Emergency Department Physician who either signs or Co-signsthis chart in the absence of a cardiologist.    Normal sinus rhythm, rate of 68 bpm.  Next elevations noted. No ectopy. RADIOLOGY:   Non-plain filmimages such as CT, Ultrasound and MRI are read by the radiologist. Plain radiographic images are visualized and preliminarily interpreted by the emergency physician with the below findings:    NAD    Interpretation per the Radiologist below, if available at the time ofthis note:    XR CHEST PORTABLE   Final Result   [address]. NO CHANGE.                ED BEDSIDE ULTRASOUND:   Performed by ED Physician - none    LABS:  Labs Reviewed   COMPREHENSIVE METABOLIC PANEL - Abnormal; Notable for the following components:       Result Value    Sodium 134 (*) Potassium 5.2 (*)     Glucose 208 (*)     BUN 32 (*)     CREATININE 1.03 (*)     GFR Non- 54.2 (*)     Calcium 10.0 (*)     ALT 61 (*)     AST 67 (*)     Globulin 4.2 (*)     All other components within normal limits   CBC WITH AUTO DIFFERENTIAL - Abnormal; Notable for the following components:    Hemoglobin 11.7 (*)     Hematocrit 34.9 (*)     MCV 73.9 (*)     MCH 24.9 (*)     RDW 16.5 (*)     Neutrophils Absolute 7.0 (*)     All other components within normal limits   POCT GLUCOSE - Abnormal; Notable for the following components:    POC Glucose 208 (*)     All other components within normal limits   MAGNESIUM   TROPONIN       All other labs were within normal range or not returned as of this dictation. EMERGENCY DEPARTMENT COURSE and DIFFERENTIAL DIAGNOSIS/MDM:   Vitals:    Vitals:    06/15/20 1450 06/15/20 1619 06/15/20 1656   BP:  (!) 156/89 (!) 174/69   Pulse: 79 66 74   Resp: 22 18 18   Temp: 98.2 °F (36.8 °C)     TempSrc: Oral     SpO2: 98% 100% 100%   Weight: 231 lb (104.8 kg)     Height: 5' 4\" (1.626 m)              MDM   Patient is a 82-year-old female presenting to the emergency department chief complaint of chest pain. Hemodynamically stable nontoxic-appearing. Not in any acute distress at this time. Pain is been ongoing for 2 weeks and I do not think that it is an acute event. Lab work is unremarkable. Troponins are negative. Chest x-ray is clear. EKG is within normal limits. I am attributing the patient's pain to be epigastric and likely related to gastritis versus GERD. Diagnosed patient with atypical chest pain and referred her to Dr. Juliet Taylor if she like to further evaluate her chest pain with a cardiologist.  She is given a prescription for omeprazole and she is instructed that in the event that she worsens in any way or the symptoms develop again she can come back to the emergency department. She appears to be anxious about her diagnosis.   However, she is discharged in a stable condition with stable vital signs ambulatory. CRITICAL CARE TIME       CONSULTS:  None    PROCEDURES:  Unless otherwise noted below, none     Procedures    FINAL IMPRESSION      1. Atypical chest pain    2.  Anxiety state          DISPOSITION/PLAN   DISPOSITION Decision To Discharge 06/15/2020 05:24:17 PM      PATIENT REFERRED TO:  Huy Delgado  [address]  [place] [address]  [phone]    Schedule an appointment as soon as possible for a visit in 1 day      Antoni [address], [address] [address]  [phone]    Schedule an appointment as soon as possible for a visit in 1 day        DISCHARGE MEDICATIONS:  New Prescriptions    OMEPRAZOLE (PRILOSEC) 20 MG DELAYED RELEASE CAPSULE    Take 1 capsule by mouth every morning (before breakfast)          (Please notethat portions of this note were completed with a voice recognition program.  Efforts were made to edit the dictations but occasionally words are mis-transcribed.)    ESDRAS Aleman CNP (electronically signed)  Attending Emergency Physician          ESDRAS Horner CNP  06/15/20 2050

## 2020-06-15 NOTE — ED NOTES
Patient presents to the ER with complaints of chest pain since last night, cough, nonproductive, and shortness of breath since last evening. Patient is 91% on RA. Afebrile. Patient states she has been taking tylenol for pain without relief. Patient follows with Dr Alexsandra Seth.       Jalil Rodriguez RN  06/15/20 9914

## 2020-06-16 ENCOUNTER — CARE COORDINATION (OUTPATIENT)
Dept: CARE COORDINATION | Age: 65
End: 2020-06-16

## 2020-06-16 PROCEDURE — 93010 ELECTROCARDIOGRAM REPORT: CPT | Performed by: INTERNAL MEDICINE

## 2020-06-17 ENCOUNTER — CARE COORDINATION (OUTPATIENT)
Dept: CARE COORDINATION | Age: 65
End: 2020-06-17

## 2020-06-17 NOTE — CARE COORDINATION
Attempted to contact patient for post ED COVID-19 Monitoring. Unable to reach patient by phone. Message left regarding the purpose of this call. Number provided and call back requested.   Episode completed/resolved

## 2020-06-18 ENCOUNTER — OFFICE VISIT (OUTPATIENT)
Dept: ENDOCRINOLOGY | Age: 65
End: 2020-06-18
Payer: COMMERCIAL

## 2020-06-18 VITALS
BODY MASS INDEX: 40.85 KG/M2 | DIASTOLIC BLOOD PRESSURE: 75 MMHG | WEIGHT: 238 LBS | HEART RATE: 64 BPM | SYSTOLIC BLOOD PRESSURE: 128 MMHG | OXYGEN SATURATION: 95 %

## 2020-06-18 LAB
CHP ED QC CHECK: NORMAL
GLUCOSE BLD-MCNC: 219 MG/DL
HBA1C MFR BLD: 9.4 %

## 2020-06-18 PROCEDURE — 99213 OFFICE O/P EST LOW 20 MIN: CPT | Performed by: INTERNAL MEDICINE

## 2020-06-18 PROCEDURE — 2022F DILAT RTA XM EVC RTNOPTHY: CPT | Performed by: INTERNAL MEDICINE

## 2020-06-18 PROCEDURE — G8427 DOCREV CUR MEDS BY ELIG CLIN: HCPCS | Performed by: INTERNAL MEDICINE

## 2020-06-18 PROCEDURE — 3046F HEMOGLOBIN A1C LEVEL >9.0%: CPT | Performed by: INTERNAL MEDICINE

## 2020-06-18 PROCEDURE — 83036 HEMOGLOBIN GLYCOSYLATED A1C: CPT | Performed by: INTERNAL MEDICINE

## 2020-06-18 PROCEDURE — 1036F TOBACCO NON-USER: CPT | Performed by: INTERNAL MEDICINE

## 2020-06-18 PROCEDURE — 3017F COLORECTAL CA SCREEN DOC REV: CPT | Performed by: INTERNAL MEDICINE

## 2020-06-18 PROCEDURE — G8417 CALC BMI ABV UP PARAM F/U: HCPCS | Performed by: INTERNAL MEDICINE

## 2020-06-18 PROCEDURE — 82962 GLUCOSE BLOOD TEST: CPT | Performed by: INTERNAL MEDICINE

## 2020-06-18 RX ORDER — FLASH GLUCOSE SENSOR
KIT MISCELLANEOUS
Qty: 2 EACH | Refills: 6 | Status: SHIPPED | OUTPATIENT
Start: 2020-06-18 | End: 2020-09-21 | Stop reason: SDUPTHER

## 2020-06-18 RX ORDER — FLASH GLUCOSE SCANNING READER
EACH MISCELLANEOUS
Qty: 1 DEVICE | Refills: 0 | Status: SHIPPED | OUTPATIENT
Start: 2020-06-18 | End: 2020-09-21 | Stop reason: SDUPTHER

## 2020-06-18 RX ORDER — INSULIN GLARGINE 100 [IU]/ML
INJECTION, SOLUTION SUBCUTANEOUS
Qty: 10 PEN | Refills: 3 | Status: ON HOLD | OUTPATIENT
Start: 2020-06-18 | End: 2020-06-28

## 2020-06-18 RX ORDER — INSULIN LISPRO 100 [IU]/ML
INJECTION, SOLUTION INTRAVENOUS; SUBCUTANEOUS
Qty: 10 PEN | Refills: 3 | Status: SHIPPED | OUTPATIENT
Start: 2020-06-18 | End: 2020-09-21 | Stop reason: SDUPTHER

## 2020-06-18 NOTE — PROGRESS NOTES
Subjective:      Patient ID: Carmita Coulter is a 60 y.o. female. 5 month follow-up month 1 diabetes patient seen in the emergency room [address] for chest pain also followed up with wound care patient on Lantus plus Humalog testing 4 times daily   Diabetes   She presents for her follow-up diabetic visit. She has type 2 diabetes mellitus. Her disease course has been fluctuating. Current diabetic treatment includes insulin injections. She is currently taking insulin pre-breakfast, pre-lunch, pre-dinner and at bedtime. Her overall blood glucose range is >200 mg/dl. (Lab Results       Component                Value               Date                       LABA1C                   9.4                 06/18/2020            )       Results for Alejandra Srinivasan (MRN [id number]) as of 6/18/2020 11:42   Ref.  Range 5/25/2019 13:30 1/6/2020 10:01 3/5/2020 05:21 5/7/2020 05:51 5/29/2020 08:42   Hemoglobin A1C Latest Units: % 7.0 (H) 7.1 8.0 (H) 7.7 (H) 8.2     Patient Active Problem List   Diagnosis    Severe major depression with psychotic features ([address]    Type 2 diabetes mellitus with hyperglycemia, with long-term current use of insulin ([address]    HTN (hypertension)    HLD (hyperlipidemia)    Hypothyroid    [facility] failure) [facility]    Non-pressure ulcer of toe ([facility])    Atherosclerotic PVD with intermittent claudication ([facility])    Acute osteomyelitis ([address]    Skin infection    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    Surgical wound dehiscence, initial encounter    S/P amputation of lesser toe, right (HCC)    Rectal bleeding    Athscl native arteries of left leg w ulceration oth prt foot ([address]    JESICA (obstructive sleep apnea)    Neuropathy due to secondary diabetes ([address]    Chest pain    PAD (peripheral artery disease) ([facility])    Cellulitis    Syncope and collapse    Severe mitral regurgitation    Ischemic cardiomyopathy    Pulmonary hypertension ([address]    Acute on chronic combined systolic and diastolic congestive heart failure (HCC)    Nocturnal hypoxia    RADHA (acute kidney injury) (HCC)    Dizziness    [facility] accident) [facility] [address]    Gait abnormality    Anxiety    Gastritis, unspecified, without bleeding    Pure hypercholesterolemia    Schizophrenia, unspecified ([facility] [address]    CAD in native artery    Extrapyramidal syndrome    Gangrene of toe of left foot (HCC)    Hypotension due to drugs    Osteomyelitis of right foot (HCC)    Nausea and vomiting    Mild intermittent asthma without complication     Allergies   Allergen Reactions    Ambien [Zolpidem Tartrate]     Capoten [Captopril]     Clioquinol     Cogentin [Benztropine]     Depakote [Divalproex Sodium]     Effexor Xr [Venlafaxine Hcl Er]     Geodon [Ziprasidone Hcl]     Lisinopril      Hyperkalemia: 4/21/20 potassium was 6.7    Lyrica [Pregabalin]     Navane [Thiothixene]     Pamelor [Nortriptyline Hcl]     Remeron [Mirtazapine]     Risperdal [Risperidone]     Trazodone And Nefazodone     Wellbutrin [Bupropion]        Current Outpatient Medications:     insulin glargine (LANTUS SOLOSTAR) 100 UNIT/ML injection pen, 40 units at bedtime, Disp: 10 pen, Rfl: 3    insulin lispro, 1 Unit Dial, (HUMALOG KWIKPEN) 100 UNIT/ML SOPN, 10 units at each meals, Disp: 10 pen, Rfl: 03    Insulin Pen Needle (NOVOFINE) 32G X 6 MM MISC, qid, Disp: 300 each, Rfl: 3    Continuous Blood Gluc  (FREESTYLE FELY 14 DAY READER) CATHLEEN, As directed, Disp: 1 Device, Rfl: 00    Continuous Blood Gluc Sensor (FREESTYLE FELY 14 DAY SENSOR) MISC, Every 2 weeks, Disp: 2 each, Rfl: 06    omeprazole (PRILOSEC) 20 MG delayed release capsule, Take 1 capsule by mouth every morning (before breakfast), Disp: 30 capsule, Rfl: 0    OXYGEN, 2 lit O2 with sleep , please give O2 concentrator, Disp: 1 Units, Rfl: 0    clotrimazole (LOTRIMIN) 1 % cream, apply to affected area twice a day IN THE MORNING AND IN THE EVENING, Disp: , Rfl:    tablet, Take 500 mg by mouth 3 times daily Take with Tramadol, Disp: , Rfl:     ALPRAZolam (XANAX) 1 MG tablet, Take 2 mg by mouth nightly as needed for Sleep., Disp: , Rfl:     gabapentin (NEURONTIN) 400 MG capsule, Take 400 mg by mouth 2 times daily. AM and afternoon, Disp: , Rfl:     sertraline (ZOLOFT) 50 MG tablet, Take 4 tablets by mouth daily, Disp: , Rfl:     haloperidol (HALDOL) 5 MG tablet, Take 5 mg by mouth daily, Disp: , Rfl:     FreeStyle Lancets MISC, 1 each by Does not apply route daily, Disp: 100 each, Rfl: 3    blood glucose test strips (FREESTYLE LITE) strip, 1 each by In Vitro route daily As needed. , Disp: 100 each, Rfl: 3    Alcohol Swabs (ALCOHOL PREP) 70 % PADS, qid (Patient taking differently: Apply 1 each topically 4 times daily qid), Disp: 300 each, Rfl: 06    Insulin Pen Needle (NOVOFINE) 32G X 6 MM MISC, Qid (Patient taking differently: 1 each 3 times daily Qid), Disp: 300 each, Rfl: 3    aspirin 81 MG chewable tablet, Take 81 mg by mouth daily, Disp: , Rfl:     folic acid (FOLVITE) 1 MG tablet, Take 1 mg by mouth daily, Disp: , Rfl:     Iron Polysacch [id number] (NIFEREX-150 FORTE PO), Take 1 tablet by mouth daily , Disp: , Rfl:     Cyanocobalamin (VITAMIN B-12) 1000 MCG extended release tablet, Take 1,000 mcg by mouth daily, Disp: , Rfl:     Cholecalciferol (VITAMIN D3) 61454 units CAPS, Take 1 capsule by mouth once a week Indications: weekly on Sun , Disp: , Rfl:     Cholecalciferol (VITAMIN D) 2000 units CAPS capsule, Take 2,000 Units by mouth daily , Disp: , Rfl:     insulin lispro (HUMALOG) 100 UNIT/ML injection vial, Inject 0-6 Units into the skin 3 times daily (with meals), Disp: 1 vial, Rfl: 3    levothyroxine (SYNTHROID) 50 MCG tablet, Take 50 mcg by mouth Daily, Disp: , Rfl:     furosemide (LASIX) 40 MG tablet, Take 40 mg by mouth daily Indications: M-W-F , Disp: , Rfl:     meclizine (ANTIVERT) 25 MG tablet, Take 25 mg by mouth three times daily, Disp: ,

## 2020-06-19 ENCOUNTER — HOSPITAL ENCOUNTER (OUTPATIENT)
Dept: WOUND CARE | Age: 65
Discharge: HOME | End: 2020-06-19
Payer: COMMERCIAL

## 2020-06-19 VITALS
RESPIRATION RATE: 18 BRPM | SYSTOLIC BLOOD PRESSURE: 127 MMHG | TEMPERATURE: 98.4 F | DIASTOLIC BLOOD PRESSURE: 60 MMHG | HEART RATE: 63 BPM

## 2020-06-19 PROCEDURE — 99213 OFFICE O/P EST LOW 20 MIN: CPT | Performed by: SURGERY

## 2020-06-19 PROCEDURE — 99212 OFFICE O/P EST SF 10 MIN: CPT

## 2020-06-19 ASSESSMENT — PAIN DESCRIPTION - DESCRIPTORS: DESCRIPTORS: BURNING

## 2020-06-19 ASSESSMENT — PAIN DESCRIPTION - FREQUENCY: FREQUENCY: CONTINUOUS

## 2020-06-19 ASSESSMENT — PAIN DESCRIPTION - ORIENTATION: ORIENTATION: LEFT

## 2020-06-19 ASSESSMENT — PAIN DESCRIPTION - PAIN TYPE: TYPE: CHRONIC PAIN

## 2020-06-19 ASSESSMENT — PAIN DESCRIPTION - LOCATION: LOCATION: FOOT

## 2020-06-19 ASSESSMENT — PAIN SCALES - GENERAL: PAINLEVEL_OUTOF10: 8

## 2020-06-19 NOTE — PROGRESS NOTES
[address] [address]                                                   Progress Note and Procedure Note      Garth Mota RECORD NUMBER:  [id number]  AGE: 60 y.o. GENDER: female  : 1955  EPISODE DATE:  2020    Subjective:     Chief Complaint   Patient presents with    Wound Check     left heel and left 5th toe wounds recheck         HISTORY of PRESENT ILLNESS ROHIT Escobedo is a 60 y.o. female who presents today for wound/ulcer evaluation. History of Wound Context: Patient is a 66year old female with history of right foot non-healing diabetic right foot ulcer. Patient had CABG at [facility] and some peripheral intervention done at [facility]. She was hospitalized for 3 months and discharge in August.  Patient presents today with a non-healing right foot ulcer. No fever or chills. No rest pain. Patient was admitted in the beginning of Oct 2019 and had pci. Patient had follow up with cardiology after her PCI and said she is at acceptable risk for peripheral angiogram.    Patient started hbo therapy 12/ week. She complete her abx . Patient c/o left foot pain. No rest pain pain burning in nature. Patient now with new ulceration left 5th toe from rubbing against 4th toe. No erythema noted. She had an atherectomy of her left sfa/pop and peroneal artery on . Patient today states pain in gone. UIcers have healed. Ms. Carlos Canela has a past medical history of Asthma, CAD (coronary artery disease), Colitis, Diabetes mellitus ([address] Hyperlipidemia, Hypertension, Prolonged emergence from general anesthesia, PVD (peripheral vascular disease) ([address] and Thyroid disease. She has a past surgical history that includes  section; Hysterectomy; Cardiac surgery; Coronary artery bypass graft (2019); Toe amputation (Right); and Colonoscopy (N/A, 2019). Her family history is not on file.      Ms. Carlos Canela reports that she has never smoked. She has never used smokeless tobacco. She reports that she does not drink alcohol or use drugs. Wound/Ulcer Pain Timing/Severity: none  Quality of pain: N/A  Severity:  0 / 10   Modifying Factors: None  Associated Signs/Symptoms: edema    Ulcer Identification:  Ulcer Type: diabetic  Contributing Factors: edema    Wound: N/A        PAST MEDICAL HISTORY        Diagnosis Date    Asthma     CAD (coronary artery disease)     CKD (chronic kidney disease) stage 3, GFR 30-59 ml/min ([facility])     Colitis     Diabetes mellitus [facility]     Hyperlipidemia     Hypertension     PAD (peripheral artery disease) ([facility])     Prolonged emergence from general anesthesia     PVD (peripheral vascular disease) ([facility]     Thyroid disease        PAST SURGICAL HISTORY    Past Surgical History:   Procedure Laterality Date    CARDIAC SURGERY       SECTION      COLONOSCOPY N/A 2019    COLONOSCOPY DIAGNOSTIC performed by Marlyn Eisenmenger, MD at [address] [place] [address] GRAFT  2019    unknown vessels    HYSTERECTOMY      TOE AMPUTATION Right     3rd toe       FAMILY HISTORY    History reviewed. No pertinent family history.     SOCIAL HISTORY    Social History     Tobacco Use    Smoking status: Never Smoker    Smokeless tobacco: Never Used   Substance Use Topics    Alcohol use: Never     Frequency: Never    Drug use: Never       ALLERGIES    Allergies   Allergen Reactions    Ambien [Zolpidem Tartrate]     Capoten [Captopril]     Clioquinol     Cogentin [Benztropine]     Depakote [Divalproex Sodium]     Effexor Xr [Venlafaxine Hcl Er]     Geodon [Ziprasidone Hcl]     Lisinopril      Hyperkalemia: 20 potassium was 6.7    Lyrica [Pregabalin]     Navane [Thiothixene]     Pamelor [Nortriptyline Hcl]     Remeron [Mirtazapine]     Risperdal [Risperidone]     Trazodone And Nefazodone     Wellbutrin [Bupropion]        MEDICATIONS    Current Outpatient Medications on File Prior injection vial Inject 0-6 Units into the skin 3 times daily (with meals) 1 vial 3    levothyroxine (SYNTHROID) 50 MCG tablet Take 50 mcg by mouth Daily      furosemide (LASIX) 40 MG tablet Take 40 mg by mouth daily Indications: M-W-F       meclizine (ANTIVERT) 25 MG tablet Take 25 mg by mouth three times daily       No current facility-administered medications on file prior to encounter. REVIEW OF SYSTEMS    Constitutional: negative  Respiratory: negative  Cardiovascular: negative  Behavioral/Psych: negative  Allergic/Immunologic: positive for ambien    Objective:      /60   Pulse 63   Temp 98.4 °F (36.9 °C) (Temporal)   Resp 18     Wt Readings from Last 3 Encounters:   06/18/20 238 lb (108 kg)   06/15/20 231 lb (104.8 kg)   05/22/20 225 lb (102.1 kg)       PHYSICAL EXAM    Constitutional:   Well nourished and well developed. Appears neat and clean. Patient is alert, oriented x3, and in no apparent distress. Respiratory:  Respiratory effort is easy and symmetric bilaterally. Rate is normal at rest and on room air. Vascular:  Pedal Pulses is not palpable and audible with doppler. Capillary refill is <3 sec to digits bilateral.  Extremities positivefor 1+ pitting edema. Neurological:   Sensation is decreased to lower extremities. Dermatological:  Wound description noted in wound assessment. The wound(s) are healed. Psychiatric:  Judgement and insight intact. Short and long term memory intact. No evidence of depression, anxiety, or agitation. Patient is calm, cooperative, and communicative. Appropriate interactions and affect.         Assessment:   Right foot non-healing ulcer   Wound Care Documentation:  Wound Care Documentation:  Wound 04/23/19 Toe (Comment  which one) Right #1. 3rd toe (Active)   Wound Image   5/22/2020  3:19 PM   Wound Diabetic Hagen 3 5/22/2020  3:19 PM   Offloading for Diabetic Foot Ulcers Post op shoe 5/12/2020  7:40 AM   Dressing Status Intact Dressing Status Intact 5/12/2020  7:40 AM   Dressing Changed Dressing reinforced 4/26/2020  8:30 PM   Dressing/Treatment Open to air 5/11/2020  9:54 PM   Wound Cleansed Rinsed/Irrigated with saline 5/22/2020  3:19 PM   Dressing Change Due 05/12/20 5/12/2020  7:40 AM   Wound Length (cm) 0 cm 5/22/2020  3:19 PM   Wound Width (cm) 0 cm 5/22/2020  3:19 PM   Wound Depth (cm) 0 cm 5/22/2020  3:19 PM   Wound Surface Area (cm^2) 0 cm^2 5/22/2020  3:19 PM   Change in Wound Size % (l*w) 100 5/22/2020  3:19 PM   Wound Volume (cm^3) 0 cm^3 5/22/2020  3:19 PM   Wound Healing % 100 5/22/2020  3:19 PM   Wound Assessment Dry;Black 5/10/2020  8:11 AM   Drainage Amount None 5/22/2020  3:19 PM   Drainage Description Yellow 4/26/2020  3:00 PM   Odor None 5/12/2020  7:40 AM   Margins Defined edges 4/23/2020  7:45 AM   Number of days: 65       Wound 03/18/20 Heel Left;Medial #4 left medial heel (Active)   Wound Image   5/22/2020  3:19 PM   Wound Diabetic Hagen 2 5/22/2020  3:19 PM   Offloading for Diabetic Foot Ulcers Post op shoe 5/12/2020  7:40 AM   Dressing Status Intact 5/12/2020  7:40 AM   Dressing Changed Dressing reinforced 4/26/2020  8:30 PM   Dressing/Treatment Open to air 5/11/2020  9:54 PM   Wound Cleansed Rinsed/Irrigated with saline 5/22/2020  3:19 PM   Dressing Change Due 05/12/20 5/12/2020  7:40 AM   Wound Length (cm) 0.5 cm 5/22/2020  3:19 PM   Wound Width (cm) 0.4 cm 5/22/2020  3:19 PM   Wound Depth (cm) 0.2 cm 5/22/2020  3:19 PM   Wound Surface Area (cm^2) 0.2 cm^2 5/22/2020  3:19 PM   Change in Wound Size % (l*w) 79.8 5/22/2020  3:19 PM   Wound Volume (cm^3) 0.04 cm^3 5/22/2020  3:19 PM   Wound Healing % 60 5/22/2020  3:19 PM   Wound Assessment Clean;Dry; Intact 5/10/2020  8:11 AM   Drainage Amount Scant 5/22/2020  3:19 PM   Drainage Description Yellow 5/22/2020  3:19 PM   Odor None 5/22/2020  3:19 PM   Margins Defined edges 5/22/2020  3:19 PM   Florencia-wound Assessment Dry; Intact 5/22/2020  3:19 PM   Non-staged Wound

## 2020-06-24 ENCOUNTER — HOSPITAL ENCOUNTER (OUTPATIENT)
Dept: WOMENS IMAGING | Age: 65
Discharge: HOME | End: 2020-06-26
Payer: COMMERCIAL

## 2020-06-24 PROCEDURE — 77067 SCR MAMMO BI INCL CAD: CPT

## 2020-06-28 ENCOUNTER — APPOINTMENT (OUTPATIENT)
Dept: GENERAL RADIOLOGY | Age: 65
End: 2020-06-28
Payer: COMMERCIAL

## 2020-06-28 ENCOUNTER — HOSPITAL ENCOUNTER (OUTPATIENT)
Age: 65
Setting detail: OBSERVATION
Discharge: HOME | End: 2020-06-29
Attending: NEUROMUSCULOSKELETAL MEDICINE, SPORTS MEDICINE | Admitting: INTERNAL MEDICINE
Payer: COMMERCIAL

## 2020-06-28 LAB
ALBUMIN SERPL-MCNC: 3.5 G/DL (ref 3.5–4.6)
ALP BLD-CCNC: 112 U/L (ref 40–130)
ALT SERPL-CCNC: 35 U/L (ref 0–33)
ANION GAP SERPL CALCULATED.3IONS-SCNC: 8 MEQ/L (ref 9–15)
ANISOCYTOSIS: ABNORMAL
APTT: 25.4 SEC (ref 24.4–36.8)
AST SERPL-CCNC: 31 U/L (ref 0–35)
BASOPHILS ABSOLUTE: 0.1 K/UL (ref 0–0.2)
BASOPHILS RELATIVE PERCENT: 1.2 %
BILIRUB SERPL-MCNC: 0.6 MG/DL (ref 0.2–0.7)
BUN BLDV-MCNC: 31 MG/DL (ref 8–23)
CALCIUM SERPL-MCNC: 9.5 MG/DL (ref 8.5–9.9)
CHLORIDE BLD-SCNC: 95 MEQ/L (ref 95–107)
CO2: 30 MEQ/L (ref 20–31)
CREAT SERPL-MCNC: 1.16 MG/DL (ref 0.5–0.9)
EOSINOPHILS ABSOLUTE: 0.1 K/UL (ref 0–0.7)
EOSINOPHILS RELATIVE PERCENT: 1 %
GFR AFRICAN AMERICAN: 57.2
GFR NON-AFRICAN AMERICAN: 47.2
GLOBULIN: 3.3 G/DL (ref 2.3–3.5)
GLUCOSE BLD-MCNC: 113 MG/DL (ref 70–99)
GLUCOSE BLD-MCNC: 165 MG/DL (ref 60–115)
GLUCOSE BLD-MCNC: 220 MG/DL (ref 60–115)
HCT VFR BLD CALC: 27.6 % (ref 37–47)
HEMOGLOBIN: 9.3 G/DL (ref 12–16)
HYPOCHROMIA: ABNORMAL
INR BLD: 1.1
LYMPHOCYTES ABSOLUTE: 1.4 K/UL (ref 1–4.8)
LYMPHOCYTES RELATIVE PERCENT: 16.3 %
MCH RBC QN AUTO: 24.5 PG (ref 27–31.3)
MCHC RBC AUTO-ENTMCNC: 33.6 % (ref 33–37)
MCV RBC AUTO: 72.8 FL (ref 82–100)
MICROCYTES: ABNORMAL
MONOCYTES ABSOLUTE: 0.6 K/UL (ref 0.2–0.8)
MONOCYTES RELATIVE PERCENT: 7.2 %
NEUTROPHILS ABSOLUTE: 6.6 K/UL (ref 1.4–6.5)
NEUTROPHILS RELATIVE PERCENT: 74.3 %
PDW BLD-RTO: 17.2 % (ref 11.5–14.5)
PERFORMED ON: ABNORMAL
PERFORMED ON: ABNORMAL
PLATELET # BLD: 207 K/UL (ref 130–400)
PLATELET SLIDE REVIEW: NORMAL
POIKILOCYTES: ABNORMAL
POLYCHROMASIA: ABNORMAL
POTASSIUM SERPL-SCNC: 4.6 MEQ/L (ref 3.4–4.9)
PROTHROMBIN TIME: 14.1 SEC (ref 12.3–14.9)
RBC # BLD: 3.79 M/UL (ref 4.2–5.4)
SLIDE REVIEW: ABNORMAL
SODIUM BLD-SCNC: 133 MEQ/L (ref 135–144)
TARGET CELLS: ABNORMAL
TOTAL PROTEIN: 6.8 G/DL (ref 6.3–8)
TROPONIN: <0.01 NG/ML (ref 0–0.01)
VACUOLATED NEUTROPHILS: PRESENT
WBC # BLD: 8.8 K/UL (ref 4.8–10.8)

## 2020-06-28 PROCEDURE — 2580000003 HC RX 258: Performed by: INTERNAL MEDICINE

## 2020-06-28 PROCEDURE — 94664 DEMO&/EVAL PT USE INHALER: CPT

## 2020-06-28 PROCEDURE — 80053 COMPREHEN METABOLIC PANEL: CPT

## 2020-06-28 PROCEDURE — 85730 THROMBOPLASTIN TIME PARTIAL: CPT

## 2020-06-28 PROCEDURE — 6370000000 HC RX 637 (ALT 250 FOR IP): Performed by: INTERNAL MEDICINE

## 2020-06-28 PROCEDURE — 2060000000 HC ICU INTERMEDIATE R&B

## 2020-06-28 PROCEDURE — 6370000000 HC RX 637 (ALT 250 FOR IP): Performed by: EMERGENCY MEDICINE

## 2020-06-28 PROCEDURE — 71045 X-RAY EXAM CHEST 1 VIEW: CPT

## 2020-06-28 PROCEDURE — 85025 COMPLETE CBC W/AUTO DIFF WBC: CPT

## 2020-06-28 PROCEDURE — 6360000002 HC RX W HCPCS: Performed by: NEUROMUSCULOSKELETAL MEDICINE, SPORTS MEDICINE

## 2020-06-28 PROCEDURE — G0378 HOSPITAL OBSERVATION PER HR: HCPCS

## 2020-06-28 PROCEDURE — 84484 ASSAY OF TROPONIN QUANT: CPT

## 2020-06-28 PROCEDURE — 85610 PROTHROMBIN TIME: CPT

## 2020-06-28 PROCEDURE — 99285 EMERGENCY DEPT VISIT HI MDM: CPT

## 2020-06-28 PROCEDURE — 96372 THER/PROPH/DIAG INJ SC/IM: CPT

## 2020-06-28 PROCEDURE — 36415 COLL VENOUS BLD VENIPUNCTURE: CPT

## 2020-06-28 PROCEDURE — 93005 ELECTROCARDIOGRAM TRACING: CPT | Performed by: NEUROMUSCULOSKELETAL MEDICINE, SPORTS MEDICINE

## 2020-06-28 PROCEDURE — 6370000000 HC RX 637 (ALT 250 FOR IP): Performed by: NEUROMUSCULOSKELETAL MEDICINE, SPORTS MEDICINE

## 2020-06-28 RX ORDER — SERTRALINE HYDROCHLORIDE 100 MG/1
200 TABLET, FILM COATED ORAL DAILY
Status: DISCONTINUED | OUTPATIENT
Start: 2020-06-28 | End: 2020-06-29 | Stop reason: HOSPADM

## 2020-06-28 RX ORDER — AMITRIPTYLINE HYDROCHLORIDE 25 MG/1
25 TABLET, FILM COATED ORAL NIGHTLY
Status: DISCONTINUED | OUTPATIENT
Start: 2020-06-28 | End: 2020-06-29 | Stop reason: HOSPADM

## 2020-06-28 RX ORDER — ASPIRIN 81 MG/1
81 TABLET ORAL DAILY
Status: DISCONTINUED | OUTPATIENT
Start: 2020-06-28 | End: 2020-06-29 | Stop reason: HOSPADM

## 2020-06-28 RX ORDER — ISOSORBIDE DINITRATE 10 MG/1
20 TABLET ORAL 3 TIMES DAILY
Status: DISCONTINUED | OUTPATIENT
Start: 2020-06-28 | End: 2020-06-29 | Stop reason: HOSPADM

## 2020-06-28 RX ORDER — ASPIRIN 81 MG/1
324 TABLET, CHEWABLE ORAL ONCE
Status: COMPLETED | OUTPATIENT
Start: 2020-06-28 | End: 2020-06-28

## 2020-06-28 RX ORDER — HYDROXYZINE PAMOATE 50 MG/1
50 CAPSULE ORAL 3 TIMES DAILY PRN
Status: DISCONTINUED | OUTPATIENT
Start: 2020-06-28 | End: 2020-06-29 | Stop reason: HOSPADM

## 2020-06-28 RX ORDER — CLOPIDOGREL BISULFATE 75 MG/1
75 TABLET ORAL DAILY
Status: ON HOLD | COMMUNITY
End: 2020-09-02

## 2020-06-28 RX ORDER — ACETAMINOPHEN 325 MG/1
650 TABLET ORAL EVERY 6 HOURS PRN
Status: DISCONTINUED | OUTPATIENT
Start: 2020-06-28 | End: 2020-06-29 | Stop reason: HOSPADM

## 2020-06-28 RX ORDER — CHOLECALCIFEROL (VITAMIN D3) 125 MCG
1000 CAPSULE ORAL DAILY
Status: DISCONTINUED | OUTPATIENT
Start: 2020-06-28 | End: 2020-06-29 | Stop reason: HOSPADM

## 2020-06-28 RX ORDER — SODIUM CHLORIDE 0.9 % (FLUSH) 0.9 %
10 SYRINGE (ML) INJECTION EVERY 12 HOURS SCHEDULED
Status: DISCONTINUED | OUTPATIENT
Start: 2020-06-28 | End: 2020-06-28

## 2020-06-28 RX ORDER — ACETAMINOPHEN 325 MG/1
650 TABLET ORAL EVERY 4 HOURS PRN
Status: DISCONTINUED | OUTPATIENT
Start: 2020-06-28 | End: 2020-06-29 | Stop reason: HOSPADM

## 2020-06-28 RX ORDER — ALBUTEROL SULFATE 2.5 MG/3ML
2.5 SOLUTION RESPIRATORY (INHALATION) EVERY 6 HOURS PRN
Status: DISCONTINUED | OUTPATIENT
Start: 2020-06-28 | End: 2020-06-29 | Stop reason: HOSPADM

## 2020-06-28 RX ORDER — TRAMADOL HYDROCHLORIDE 50 MG/1
50 TABLET ORAL 3 TIMES DAILY
Status: DISCONTINUED | OUTPATIENT
Start: 2020-06-28 | End: 2020-06-29 | Stop reason: HOSPADM

## 2020-06-28 RX ORDER — GABAPENTIN 400 MG/1
400 CAPSULE ORAL 2 TIMES DAILY
Status: DISCONTINUED | OUTPATIENT
Start: 2020-06-28 | End: 2020-06-29 | Stop reason: HOSPADM

## 2020-06-28 RX ORDER — SODIUM CHLORIDE 0.9 % (FLUSH) 0.9 %
10 SYRINGE (ML) INJECTION EVERY 12 HOURS SCHEDULED
Status: DISCONTINUED | OUTPATIENT
Start: 2020-06-28 | End: 2020-06-29 | Stop reason: HOSPADM

## 2020-06-28 RX ORDER — PROMETHAZINE HYDROCHLORIDE 25 MG/1
12.5 TABLET ORAL EVERY 6 HOURS PRN
Status: DISCONTINUED | OUTPATIENT
Start: 2020-06-28 | End: 2020-06-29 | Stop reason: HOSPADM

## 2020-06-28 RX ORDER — DEXTROSE MONOHYDRATE 50 MG/ML
100 INJECTION, SOLUTION INTRAVENOUS PRN
Status: DISCONTINUED | OUTPATIENT
Start: 2020-06-28 | End: 2020-06-29 | Stop reason: HOSPADM

## 2020-06-28 RX ORDER — ONDANSETRON 2 MG/ML
4 INJECTION INTRAMUSCULAR; INTRAVENOUS EVERY 6 HOURS PRN
Status: DISCONTINUED | OUTPATIENT
Start: 2020-06-28 | End: 2020-06-29 | Stop reason: HOSPADM

## 2020-06-28 RX ORDER — SODIUM CHLORIDE 0.9 % (FLUSH) 0.9 %
10 SYRINGE (ML) INJECTION PRN
Status: DISCONTINUED | OUTPATIENT
Start: 2020-06-28 | End: 2020-06-29 | Stop reason: HOSPADM

## 2020-06-28 RX ORDER — METOPROLOL SUCCINATE 50 MG/1
50 TABLET, EXTENDED RELEASE ORAL 2 TIMES DAILY
Status: DISCONTINUED | OUTPATIENT
Start: 2020-06-28 | End: 2020-06-29 | Stop reason: HOSPADM

## 2020-06-28 RX ORDER — TRAMADOL HYDROCHLORIDE 50 MG/1
50 TABLET ORAL 3 TIMES DAILY
Status: ON HOLD | COMMUNITY
End: 2020-09-02

## 2020-06-28 RX ORDER — NICOTINE POLACRILEX 4 MG
15 LOZENGE BUCCAL PRN
Status: DISCONTINUED | OUTPATIENT
Start: 2020-06-28 | End: 2020-06-29 | Stop reason: HOSPADM

## 2020-06-28 RX ORDER — MECLIZINE HYDROCHLORIDE 25 MG/1
25 TABLET ORAL 3 TIMES DAILY
Status: DISCONTINUED | OUTPATIENT
Start: 2020-06-28 | End: 2020-06-29 | Stop reason: HOSPADM

## 2020-06-28 RX ORDER — VITAMIN B COMPLEX
2000 TABLET ORAL DAILY
Status: DISCONTINUED | OUTPATIENT
Start: 2020-06-28 | End: 2020-06-29 | Stop reason: HOSPADM

## 2020-06-28 RX ORDER — ATORVASTATIN CALCIUM 40 MG/1
80 TABLET, FILM COATED ORAL NIGHTLY
Status: DISCONTINUED | OUTPATIENT
Start: 2020-06-28 | End: 2020-06-29 | Stop reason: HOSPADM

## 2020-06-28 RX ORDER — ALPRAZOLAM 1 MG/1
1 TABLET ORAL NIGHTLY PRN
Status: DISCONTINUED | OUTPATIENT
Start: 2020-06-28 | End: 2020-06-29 | Stop reason: HOSPADM

## 2020-06-28 RX ORDER — CLOPIDOGREL BISULFATE 75 MG/1
75 TABLET ORAL DAILY
Status: DISCONTINUED | OUTPATIENT
Start: 2020-06-28 | End: 2020-06-29 | Stop reason: HOSPADM

## 2020-06-28 RX ORDER — HYDRALAZINE HYDROCHLORIDE 50 MG/1
50 TABLET, FILM COATED ORAL EVERY 8 HOURS SCHEDULED
Status: DISCONTINUED | OUTPATIENT
Start: 2020-06-28 | End: 2020-06-29 | Stop reason: HOSPADM

## 2020-06-28 RX ORDER — INSULIN GLARGINE 100 [IU]/ML
35 INJECTION, SOLUTION SUBCUTANEOUS NIGHTLY
Status: DISCONTINUED | OUTPATIENT
Start: 2020-06-28 | End: 2020-06-29 | Stop reason: HOSPADM

## 2020-06-28 RX ORDER — FOLIC ACID 1 MG/1
1 TABLET ORAL DAILY
Status: DISCONTINUED | OUTPATIENT
Start: 2020-06-28 | End: 2020-06-29 | Stop reason: HOSPADM

## 2020-06-28 RX ORDER — DEXTROSE MONOHYDRATE 25 G/50ML
12.5 INJECTION, SOLUTION INTRAVENOUS PRN
Status: DISCONTINUED | OUTPATIENT
Start: 2020-06-28 | End: 2020-06-29 | Stop reason: HOSPADM

## 2020-06-28 RX ORDER — FUROSEMIDE 40 MG/1
40 TABLET ORAL DAILY
Status: DISCONTINUED | OUTPATIENT
Start: 2020-06-28 | End: 2020-06-29 | Stop reason: HOSPADM

## 2020-06-28 RX ORDER — NITROGLYCERIN 0.4 MG/1
0.4 TABLET SUBLINGUAL EVERY 5 MIN PRN
Status: DISCONTINUED | OUTPATIENT
Start: 2020-06-28 | End: 2020-06-29 | Stop reason: HOSPADM

## 2020-06-28 RX ORDER — ACETAMINOPHEN 650 MG/1
650 SUPPOSITORY RECTAL EVERY 6 HOURS PRN
Status: DISCONTINUED | OUTPATIENT
Start: 2020-06-28 | End: 2020-06-29 | Stop reason: HOSPADM

## 2020-06-28 RX ORDER — POLYETHYLENE GLYCOL 3350 17 G/17G
17 POWDER, FOR SOLUTION ORAL DAILY PRN
Status: DISCONTINUED | OUTPATIENT
Start: 2020-06-28 | End: 2020-06-29 | Stop reason: HOSPADM

## 2020-06-28 RX ORDER — ASPIRIN 81 MG/1
81 TABLET, CHEWABLE ORAL DAILY
Status: DISCONTINUED | OUTPATIENT
Start: 2020-06-29 | End: 2020-06-28

## 2020-06-28 RX ORDER — LEVOTHYROXINE SODIUM 0.05 MG/1
50 TABLET ORAL DAILY
Status: DISCONTINUED | OUTPATIENT
Start: 2020-06-28 | End: 2020-06-29 | Stop reason: HOSPADM

## 2020-06-28 RX ADMIN — AMITRIPTYLINE HYDROCHLORIDE 25 MG: 25 TABLET, FILM COATED ORAL at 23:18

## 2020-06-28 RX ADMIN — HALOPERIDOL 5 MG: 2 TABLET ORAL at 23:46

## 2020-06-28 RX ADMIN — HYDRALAZINE HYDROCHLORIDE 50 MG: 50 TABLET, FILM COATED ORAL at 14:11

## 2020-06-28 RX ADMIN — Medication 10 ML: at 23:46

## 2020-06-28 RX ADMIN — GABAPENTIN 400 MG: 400 CAPSULE ORAL at 23:16

## 2020-06-28 RX ADMIN — ACETAMINOPHEN 650 MG: 325 TABLET, FILM COATED ORAL at 06:01

## 2020-06-28 RX ADMIN — ENOXAPARIN SODIUM 100 MG: 100 INJECTION SUBCUTANEOUS at 06:01

## 2020-06-28 RX ADMIN — ATORVASTATIN CALCIUM 80 MG: 40 TABLET, FILM COATED ORAL at 23:17

## 2020-06-28 RX ADMIN — ISOSORBIDE DINITRATE 20 MG: 10 TABLET ORAL at 23:17

## 2020-06-28 RX ADMIN — HYDRALAZINE HYDROCHLORIDE 50 MG: 50 TABLET, FILM COATED ORAL at 23:46

## 2020-06-28 RX ADMIN — TRAMADOL HYDROCHLORIDE 50 MG: 50 TABLET, FILM COATED ORAL at 23:15

## 2020-06-28 RX ADMIN — ALPRAZOLAM 1 MG: 1 TABLET ORAL at 23:46

## 2020-06-28 RX ADMIN — MECLIZINE HYDROCHLORIDE 25 MG: 25 TABLET ORAL at 14:11

## 2020-06-28 RX ADMIN — MECLIZINE HYDROCHLORIDE 25 MG: 25 TABLET ORAL at 23:16

## 2020-06-28 RX ADMIN — ISOSORBIDE DINITRATE 20 MG: 10 TABLET ORAL at 14:12

## 2020-06-28 RX ADMIN — INSULIN GLARGINE 35 UNITS: 100 INJECTION, SOLUTION SUBCUTANEOUS at 23:48

## 2020-06-28 RX ADMIN — METOPROLOL SUCCINATE 50 MG: 50 TABLET, EXTENDED RELEASE ORAL at 23:16

## 2020-06-28 RX ADMIN — ASPIRIN 81 MG 324 MG: 81 TABLET ORAL at 05:27

## 2020-06-28 RX ADMIN — INSULIN LISPRO 4 UNITS: 100 INJECTION, SOLUTION INTRAVENOUS; SUBCUTANEOUS at 16:26

## 2020-06-28 RX ADMIN — TRAMADOL HYDROCHLORIDE 50 MG: 50 TABLET, FILM COATED ORAL at 14:12

## 2020-06-28 ASSESSMENT — PAIN SCALES - GENERAL
PAINLEVEL_OUTOF10: 0
PAINLEVEL_OUTOF10: 8
PAINLEVEL_OUTOF10: 0
PAINLEVEL_OUTOF10: 0

## 2020-06-28 NOTE — ED NOTES
[facility]  06/28/20 9851

## 2020-06-28 NOTE — PROGRESS NOTES
years  []   Pulmonary Disorder  (acute or chronic)  [x]   Severe or Chronic w/ Exacerbation  []     Surgical Status No [x]   Surgeries     General []   Surgery Lower []   Abdominal Thoracic or []   Upper Abdominal Thoracic with  PulmonaryDisorder  []     Chest X-ray Clear/Not  Ordered     [x]  Chronic Changes  Results Pending  []  Infiltrates, atelectasis, pleural effusion, or edema  []  Infiltrates in more than one lobe []  Infiltrate + Atelectasis, &/or pleural effusion  []    Respiratory Pattern Regular,  RR = 12-20 [x]  Increased,  RR = 21-25 []  HARRIS, irregular,  or RR = 26-30 []  Decreased FEV1  or RR = 31-35 []  Severe SOB, use  of accessory muscles, or RR ? 35  []    Mental Status Alert, oriented,  Cooperative [x]  Confused but Follows commands []  Lethargic or unable to follow commands []  Obtunded  []  Comatose  []    Breath Sounds Clear to  auscultation  []  Decreased unilaterally or  in bases only [x]  Decreased  bilaterally  []  Crackles or intermittent wheezes []  Wheezes []    Cough Strong, Spontan., & nonproductive [x]  Strong,  spontaneous, &  productive []  Weak,  Nonproductive []  Weak, productive or  with wheezes []  No spontaneous  cough or may require suctioning []    Level of Activity Ambulatory [x]  Ambulatory w/ Assist  []  Non-ambulatory []  Paraplegic []  Quadriplegic []    Total    Score:___4____     Triage Score:___5___      Tri       Triage:     1. (>20) Freq: Q3    2. (16-20) Freq: Q4   3. (11-15) Freq: QID & Albuterol Q2 PRN    4. (6-10) Freq: TID & Albuterol Q2 PRN    5. (0-5) Freq Q4prn

## 2020-06-28 NOTE — ED PROVIDER NOTES
[address] [facility] ED  eMERGENCYdEPARTMENT eNCOUnter      Pt Name: Kalyn Campoverde  MRN: [id number]  [place] 1955  Date of evaluation: 6/28/2020  Provider:ALDO GOMEZ MD    CHIEF COMPLAINT           HPI  Kalyn Campoverde is a 60 y.o. female per chart review has a h/o having chest pain retrosternal area midsternal all day and radiation to the shoulder. Complains of some shortness of breath. Patient had open heart surgery  Patient presents with chest pain. Pain location:  Substernal area and L chest  Pain quality: aching, burning, dull, pressure, sharp and tightness   Pain radiates to:  L shoulder and L arm  Pain radiates to the back: no   Pain severity:  Moderate  Onset quality:  Sudden  Timing:  Intermittent  Progression:  Worsening  Chronicity:  New  Context: at rest   Relieved by:  None tried  Worsened by:  Nothing tried  Ineffective treatments:  None tried  Associated symptoms: Denies diaphoresis, denies dizziness, denies shortness of breath     ROS  Review of Systems  Unless otherwise stated in this report or unable to obtain because of the patient's clinical or mental status as evidenced by the medical record, the patient's positive and negative responses for review of systems for the constitutional, eyes, ENT,  cardiovascular, respiratory, gastrointestinal, neurological, genitourinary, musculoskeletal, and integument systems and related systems to the presenting problem are either stated in the HPI or were not pertinent or were negative for the symptoms and/or  complaints related to the present medical problems.     PAST MEDICAL HISTORY     Past Medical History:   Diagnosis Date    Asthma     CAD (coronary artery disease)     CKD (chronic kidney disease) stage 3, GFR 30-59 ml/min ([facility])     Colitis     [facility]     Hyperlipidemia     Hypertension     PAD (peripheral artery disease) ([facility])     Prolonged emergence from general anesthesia     [facility]) [facility]     Thyroid disease          SURGICAL HISTORY       Past Surgical History:   Procedure Laterality Date    CARDIAC SURGERY       SECTION      COLONOSCOPY N/A 2019    COLONOSCOPY DIAGNOSTIC performed by Fátima Smith MD at [facility] GRAFT  2019    unknown vessels    HYSTERECTOMY      TOE AMPUTATION Right     3rd toe         CURRENTMEDICATIONS       Previous Medications    ACETAMINOPHEN (TYLENOL) 500 MG TABLET    Take 500 mg by mouth 3 times daily Take with Tramadol    ALBUTEROL SULFATE HFA (VENTOLIN HFA) 108 (90 BASE) MCG/ACT INHALER    Inhale 2 puffs into the lungs as needed for Wheezing Every 4-6 hours PRN    ALCOHOL SWABS (ALCOHOL PREP) 70 % PADS    qid    ALPRAZOLAM (XANAX) 1 MG TABLET    Take 2 mg by mouth nightly as needed for Sleep. AMITRIPTYLINE (ELAVIL) 25 MG TABLET    Take 25 mg by mouth nightly    ASPIRIN 81 MG CHEWABLE TABLET    Take 81 mg by mouth daily    ATORVASTATIN (LIPITOR) 40 MG TABLET    Take 2 tablets by mouth daily    BASAGLAR KWIKPEN 100 UNIT/ML INJECTION PEN    inject 35 unit subcutaneously every evening    BLOOD GLUCOSE TEST STRIPS (FREESTYLE LITE) STRIP    1 each by In Vitro route daily As needed.     CHOLECALCIFEROL (VITAMIN D) 2000 UNITS CAPS CAPSULE    Take 2,000 Units by mouth daily     CHOLECALCIFEROL (VITAMIN D3) 44547 UNITS CAPS    Take 1 capsule by mouth once a week Indications: weekly on Sun     CLOTRIMAZOLE (LOTRIMIN) 1 % CREAM    apply to affected area twice a day IN THE MORNING AND IN THE EVENING    CONTINUOUS BLOOD GLUC  (FREESTYLE FELY 14 DAY READER) CATHLEEN    As directed    CONTINUOUS BLOOD GLUC SENSOR (FREESTYLE FELY 14 DAY SENSOR) [facility]    Every 2 weeks    CYANOCOBALAMIN (VITAMIN B-12) 1000 MCG EXTENDED RELEASE TABLET    Take 1,000 mcg by mouth daily    EMOLLIENT (EUCERIN INTENSIVE REPAIR HAND) 2.5-10 % CREA    APPLY TO FEET AT BEDTIME; PLACE SOCKS OVER FEET AFTER APPLICATION IF NEEDED FOR DRY CRACKING FEET    FOLIC ACID (FOLVITE) 1 MG TABLET    Take 1 mg by mouth daily    FREESTYLE LANCETS MISC    1 each by Does not apply route daily    FUROSEMIDE (LASIX) 40 MG TABLET    Take 40 mg by mouth daily Indications: M-W-F     GABAPENTIN (NEURONTIN) 400 MG CAPSULE    Take 400 mg by mouth 2 times daily. AM and afternoon    HALOPERIDOL (HALDOL) 5 MG TABLET    Take 5 mg by mouth daily    HYDRALAZINE (APRESOLINE) 50 MG TABLET    Take 1 tablet by mouth every 8 hours    HYDROXYZINE (VISTARIL) 25 MG CAPSULE    Take by mouth    INSULIN GLARGINE (LANTUS SOLOSTAR) 100 UNIT/ML INJECTION PEN    40 units at bedtime    INSULIN GLARGINE (LANTUS) 100 UNIT/ML INJECTION VIAL    Inject 30 Units into the skin nightly    INSULIN LISPRO (HUMALOG) 100 UNIT/ML INJECTION VIAL    Inject 0-6 Units into the skin 3 times daily (with meals)    INSULIN LISPRO (HUMALOG) 100 UNIT/ML INJECTION VIAL    Inject 6 Units into the skin 3 times daily (before meals)    INSULIN LISPRO, 1 UNIT DIAL, (HUMALOG KWIKPEN) 100 UNIT/ML SOPN    10 units at each meals    INSULIN PEN NEEDLE (NOVOFINE) 32G X 6 MM MISC    Qid    INSULIN PEN NEEDLE (NOVOFINE) 32G X 6 MM MISC    qid    IRON POLYSACCH [id number] (NIFEREX-150 FORTE PO)    Take 1 tablet by mouth daily     ISOSORBIDE DINITRATE (ISORDIL) 20 MG TABLET    Take 1 tablet by mouth 3 times daily    LEVOTHYROXINE (SYNTHROID) 50 MCG TABLET    Take 50 mcg by mouth Daily    MECLIZINE (ANTIVERT) 25 MG TABLET    Take 25 mg by mouth three times daily    MELOXICAM (MOBIC) 7.5 MG TABLET    take 1 tablet by mouth twice a day    METOPROLOL SUCCINATE (TOPROL XL) 50 MG EXTENDED RELEASE TABLET    Take 1 tablet by mouth 2 times daily    MUPIROCIN (BACTROBAN) 2 % OINTMENT    Apply topically 3 times daily Apply topically 3 times daily to affected area    NITROGLYCERIN (NITROSTAT) 0.4 MG SL TABLET    up to max of 3 total doses. If no relief after 1 dose, call 911.     NUTRITIONAL SUPPLEMENTS (GLUCERGRAHAM SHAJASON) ASHLEYQD take as directed three times a day    OMEPRAZOLE (PRILOSEC) 20 MG DELAYED RELEASE CAPSULE    Take 1 capsule by mouth every morning (before breakfast)    OXYGEN    2 lit O2 with sleep , please give O2 concentrator    PRAZOSIN (MINIPRESS) 1 MG CAPSULE        SERTRALINE (ZOLOFT) 50 MG TABLET    Take 4 tablets by mouth daily    TICAGRELOR (BRILINTA) 90 MG TABS TABLET    Take 1 tablet by mouth 2 times daily       ALLERGIES     Ambien [zolpidem tartrate]; Capoten [captopril]; Clioquinol; Cogentin [benztropine]; Depakote [divalproex sodium]; Effexor xr [venlafaxine hcl er]; Geodon [ziprasidone hcl]; Lisinopril; Lyrica [pregabalin]; Navane [thiothixene]; Pamelor [nortriptyline hcl]; Remeron [mirtazapine]; Risperdal [risperidone]; Trazodone and nefazodone; and Wellbutrin [bupropion]    FAMILY HISTORY     History reviewed. No pertinent family history.        SOCIAL HISTORY       Social History     Socioeconomic History    Marital status:      Spouse name: None    Number of children: None    Years of education: None    Highest education level: None   Occupational History    None   Social Needs    Financial resource strain: None    Food insecurity     Worry: None     Inability: None    Transportation needs     Medical: None     Non-medical: None   Tobacco Use    Smoking status: Never Smoker    Smokeless tobacco: Never Used   Substance and Sexual Activity    Alcohol use: Never     Frequency: Never    Drug use: Never    Sexual activity: None   Lifestyle    Physical activity     Days per week: None     Minutes per session: None    Stress: None   Relationships    Social connections     Talks on phone: None     Gets together: None     Attends [employer] service: None     Active member of club or organization: None     Attends meetings of clubs or organizations: None     Relationship status: None    Intimate partner violence     Fear of current or ex partner: None     Emotionally abused: None     Physically abused: None     Forced sexual activity: None   Other Topics Concern    None   Social History Narrative         Lives With: Spouse    Type of Home: [address]: One level    Home Access: Elevator    Bathroom Shower/Tub: Tub/Shower unit(Simultaneous filing. User may not have seen previous data.)    Bathroom Equipment: Grab bars in shower, Shower chair    Home Equipment: Rolling walker, [address] bed    ADL Assistance: Needs assistance    Homemaking Assistance: Needs assistance    Homemaking Responsibilities: No    Ambulation Assistance: Independent    Transfer Assistance: Independent    Active : No    Additional Comments: Pt wears orthopedic shoes at baseline    The patient states she is current with [facility](RN per the ). The patient had a walker, but obtained a hospital bed, wheel chair, BSC and O2 last admission (from [address] [place] [address] Road). pharmacy is [address] [place] [address],[address] [address]., Justina Metzger. Transportation is provided by [employer]	[facility] () per the patient         PHYSICAL EXAM       ED Triage Vitals   BP Temp Temp Source Pulse Resp SpO2 Height Weight   06/28/20 0511 06/28/20 0502 06/28/20 0502 06/28/20 0502 06/28/20 0502 06/28/20 0502 06/28/20 0502 06/28/20 0502   (!) 118/51 97.6 °F (36.4 °C) Oral 56 20 100 % 5' 2\" (1.575 m) 230 lb (104.3 kg)       Physical Exam    Vitals signs and nursing note reviewed. Constitutional:       Appearance: Normal appearance. He is well-developed and normal weight. HENT:      Head: Normocephalic and atraumatic. Right Ear: Tympanic membrane, ear canal and external ear normal.      Left Ear: Tympanic membrane, ear canal and external ear normal.      Nose: Nose normal.      Mouth/Throat:      Mouth: Mucous membranes are moist.      Pharynx: Oropharynx is clear. Eyes:      Extraocular Movements: Extraocular movements intact.       Conjunctiva/sclera: Conjunctivae normal.      Pupils: Pupils are equal, round, and reactive to light. Neck:      Musculoskeletal: Normal range of motion and neck supple. Cardiovascular:      Rate and Rhythm: Normal rate and regular rhythm. Pulses: Normal pulses. Heart sounds: Normal heart sounds. Pulmonary:      Effort: Pulmonary effort is normal.      Breath sounds: Normal breath sounds. Abdominal:      General: Abdomen is flat. Bowel sounds are normal.      Palpations: Abdomen is soft. Musculoskeletal: Normal range of motion. Skin:     General: Skin is warm and dry. Capillary Refill: Capillary refill takes less than 2 seconds. Neurological:      General: No focal deficit present. Mental Status: He is alert and oriented for age. Psychiatric:         Mood and Affect: Mood normal.         Behavior: Behavior normal.         Thought Content: Thought content normal.         Judgment: Judgment normal.         MDM  Saline lock and labs drawn. Patient medicated. Aspirin 324 mg p.o. Lovenox 1 mg/kg given  EKG shows NSR with HR below 100, normal axis, normal intervals, non specific STT changes. This was discussed with Dr. Mary Toure patient is admitted. FINAL IMPRESSION      1.  Unstable angina pectoris [facility])          DISPOSITION/PLAN   DISPOSITION          DISCHARGE MEDICATIONS:  Isha Diaz MD(electronically signed)  Attending Emergency Physician            Yash De La Torre MD  06/28/20 0846

## 2020-06-28 NOTE — ED NOTES
Labs  obtained by this RN, labeled and sent to lab via tube syst     Atiya Grigsby, [facility]  06/28/20 0646

## 2020-06-28 NOTE — ED TRIAGE NOTES
Patient has c/o chest pain since yesterday. Patient points to mid sternal and states it does not radiate anywhere. Pain is constants. Patient states pain is tightness in feeling. Rates 8/10. Patient took no meds at home.

## 2020-06-28 NOTE — H&P
Hospital Medicine  History and Physical    Patient:  Shamika Phillips  MRN: [id number]    CHIEF COMPLAINT:    Chief Complaint   Patient presents with    Chest Pain     x 1 day       History Obtained From:  Patient, EMR  Primary Care Physician: Gerard Jaimes    HISTORY OF PRESENT ILLNESS:   The patient is a 60 y.o. female with PMH of  has a past medical history of Asthma, CAD (coronary artery disease), CKD (chronic kidney disease) stage 3, GFR 30-59 ml/min ([address] Colitis, Diabetes mellitus ([address] Hyperlipidemia, Hypertension, PAD (peripheral artery disease) ([address] Prolonged emergence from general anesthesia, PVD (peripheral vascular disease) ([address] and Thyroid disease. who presents with chest pain. Started yesterday. Happened all day. No radiation. No abd pain. No nausea or emesis. No fever or chills. No dyspnea or wheezing. Past Medical History:      Diagnosis Date    Asthma     CAD (coronary artery disease)     CKD (chronic kidney disease) stage 3, GFR 30-59 ml/min ([facility])     Colitis     Diabetes mellitus ([address]     Hyperlipidemia     Hypertension     PAD (peripheral artery disease) ([facility])     Prolonged emergence from general anesthesia     PVD (peripheral vascular disease) ([address]     Thyroid disease        Past Surgical History:      Procedure Laterality Date    CARDIAC SURGERY       SECTION      COLONOSCOPY N/A 2019    COLONOSCOPY DIAGNOSTIC performed by Tarik Townsend MD at [address] [place] [address] GRAFT  2019    unknown vessels    HYSTERECTOMY      TOE AMPUTATION Right     3rd toe       Medications Prior to Admission:    Prior to Admission medications    Medication Sig Start Date End Date Taking? Authorizing Provider   clopidogrel (PLAVIX) 75 MG tablet Take 75 mg by mouth daily   Yes Historical Provider, MD   traMADol (ULTRAM) 50 MG tablet Take 50 mg by mouth 3 times daily.  Take with tylenol   Yes Historical Provider, MD   insulin lispro, 1 Unit Dial, (HUMALOG KWIKPEN) 100 UNIT/ML SOPN 10 units at each meals 6/18/20  Yes Fredy Mercado MD   OXYGEN 2 lit O2 with sleep , please give O2 concentrator 6/12/20  Yes Mary Chiu MD   hydrOXYzine (VISTARIL) 25 MG capsule Take 50 mg by mouth 3 times daily as needed  3/4/20  Yes Historical Provider, MD   Nutritional Supplements ([facility]) LIQD take as directed three times a day 6/1/20  Yes Historical Provider, MD   insulin glargine (LANTUS) 100 UNIT/ML injection vial Inject 30 Units into the skin nightly  Patient taking differently: Inject 35 Units into the skin nightly  5/12/20  Yes Indiana Quijano MD   isosorbide dinitrate (ISORDIL) 20 MG tablet Take 1 tablet by mouth 3 times daily 4/28/20  Yes Gilmar Uribe MD   nitroGLYCERIN (NITROSTAT) 0.4 MG SL tablet up to max of 3 total doses.  If no relief after 1 dose, call 911. 4/28/20  Yes Gilmar Uribe MD   hydrALAZINE (APRESOLINE) 50 MG tablet Take 1 tablet by mouth every 8 hours 4/28/20  Yes Gilmar Uribe MD   atorvastatin (LIPITOR) 40 MG tablet Take 2 tablets by mouth daily 4/28/20  Yes Gilmar Uribe MD   metoprolol succinate (TOPROL XL) 50 MG extended release tablet Take 1 tablet by mouth 2 times daily 4/28/20  Yes Gilmar Uribe MD   ticagrelor (BRILINTA) 90 MG TABS tablet Take 1 tablet by mouth 2 times daily 4/28/20  Yes Gilmar Uribe MD   mupirocin (BACTROBAN) 2 % ointment Apply topically 3 times daily Apply topically 3 times daily to affected area   Yes Historical Provider, MD   amitriptyline (ELAVIL) 25 MG tablet Take 25 mg by mouth nightly   Yes Historical Provider, MD   albuterol sulfate HFA (VENTOLIN HFA) 108 (90 Base) MCG/ACT inhaler Inhale 2 puffs into the lungs as needed for Wheezing Every 4-6 hours PRN   Yes Historical Provider, MD   acetaminophen (TYLENOL) 500 MG tablet Take 500 mg by mouth 3 times daily Take with Tramadol   Yes Historical Provider, MD   ALPRAZolam (XANAX) 1 MG tablet Take 2 mg by mouth nightly as needed for Sleep. Yes Historical Provider, MD   gabapentin (NEURONTIN) 400 MG capsule Take 400 mg by mouth 2 times daily.  AM and 800 mg in pm-9pm   Yes Historical Provider, MD   sertraline (ZOLOFT) 50 MG tablet Take 4 tablets by mouth daily 2/12/20  Yes Rosie Justin, DO   haloperidol (HALDOL) 5 MG tablet Take 5 mg by mouth daily   Yes Historical Provider, MD   aspirin 81 MG chewable tablet Take 81 mg by mouth daily   Yes Historical Provider, MD   folic acid (FOLVITE) 1 MG tablet Take 1 mg by mouth daily   Yes Historical Provider, MD   Iron Polysacch [id number] (NIFEREX-150 FORTE PO) Take 1 tablet by mouth daily    Yes Historical Provider, MD   Cyanocobalamin (VITAMIN B-12) 1000 MCG extended release tablet Take 1,000 mcg by mouth daily   Yes Historical Provider, MD   Cholecalciferol (VITAMIN D3) 85636 units CAPS Take 1 capsule by mouth once a week Indications: weekly on Sun    Yes Historical Provider, MD   Cholecalciferol (VITAMIN D) 2000 units CAPS capsule Take 2,000 Units by mouth daily    Yes Historical Provider, MD   insulin lispro (HUMALOG) 100 UNIT/ML injection vial Inject 0-6 Units into the skin 3 times daily (with meals) 2/28/19  Yes ESDRAS Chambers - CNS   levothyroxine (SYNTHROID) 50 MCG tablet Take 50 mcg by mouth Daily   Yes Historical Provider, MD   furosemide (LASIX) 40 MG tablet Take 40 mg by mouth daily Indications: M-W-F    Yes Historical Provider, MD   meclizine (ANTIVERT) 25 MG tablet Take 25 mg by mouth three times daily   Yes Historical Provider, MD   Insulin Pen Needle (NOVOFINE) 32G X 6 MM MISC qid 6/18/20   Anaid Sorensen MD   Continuous Blood Gluc  (FREESTYLE FELY 14 DAY READER) CATHLEEN As directed 6/18/20   Anaid Sorensen MD   Continuous Blood Gluc Sensor (FREESTYLE FELY 14 DAY SENSOR) MISC Every 2 weeks 6/18/20   Anaid Sorensen MD   clotrimazole (LOTRIMIN) 1 % cream apply to affected area twice a day IN THE MORNING AND IN THE EVENING 3/26/20   Historical Provider, MD Emollient (EUCERIN INTENSIVE REPAIR HAND) 2.5-10 % CREA APPLY TO FEET AT BEDTIME; PLACE SOCKS OVER FEET AFTER APPLICATION IF NEEDED FOR DRY CRACKING FEET 3/15/20   Historical Provider, MD Branchyle Lancets MISC 1 each by Does not apply route daily 1/30/20   Erin Childs MD   blood glucose test strips (FREESTYLE LITE) strip 1 each by In Vitro route daily As needed. 1/30/20   Erin Childs MD   Alcohol Swabs (ALCOHOL PREP) 70 % PADS qid  Patient taking differently: Apply 1 each topically 4 times daily qid 1/30/20   Erin Childs MD   Insulin Pen Needle (NOVOFINE) 32G X 6 MM MISC Qid  Patient taking differently: 1 each 3 times daily Qid 1/30/20   Erin Childs MD       Allergies:  Ambien [zolpidem tartrate]; Capoten [captopril]; Clioquinol; Cogentin [benztropine]; Depakote [divalproex sodium]; Effexor xr [venlafaxine hcl er]; Geodon [ziprasidone hcl]; Lisinopril; Lyrica [pregabalin]; Navane [thiothixene]; Pamelor [nortriptyline hcl]; Remeron [mirtazapine]; Risperdal [risperidone]; Trazodone and nefazodone; and Wellbutrin [bupropion]    Social History:   TOBACCO:   reports that she has never smoked. She has never used smokeless tobacco.  ETOH:   reports no history of alcohol use. Family History:   History reviewed. No pertinent family history. REVIEW OF SYSTEMS:  Ten systems reviewed and negative except for stated in HPI    Physical Exam:    Vitals: BP (!) 130/91   Pulse 55   Temp 97 °F (36.1 °C) (Oral)   Resp 20   Ht 5' 2\" (1.575 m)   Wt 247 lb 5 oz (112.2 kg)   SpO2 97%   BMI 45.23 kg/m²   General appearance: alert, appears stated age and cooperative  Skin: Skin color, texture, turgor normal. No rashes or lesions  HEENT: Head: Normocephalic, no lesions, without obvious abnormality. . No dental issues   Neck: no adenopathy, no carotid bruit, no JVD, supple, symmetrical, trachea midline and thyroid not enlarged, symmetric, no tenderness/mass/nodules  Lungs: clear to auscultation bilaterally  Heart: regular

## 2020-06-28 NOTE — CONSULTS
[facility]    Patient History and Records, EMR reviewed. Patient Interviewed and examined. Poor historian given language barrier. Denies LH, Dizziness, TIA or CVA Symptoms. No Orthopnea, Edema or CHF symptoms. No Palpitations. No Syncope. No Fever, Chills or Cold symptoms. No GI,  or Bleeding complaints. Cardiac and general ROS otherwise negative. [address] otherwise negative other than noted. Past Medical History:   Diagnosis Date    Asthma     CAD (coronary artery disease)     CKD (chronic kidney disease) stage 3, GFR 30-59 ml/min [facility]     Colitis     [facility]     Hyperlipidemia     Hypertension     PAD [facility]peripheral artery disease) ([facility])     Prolonged emergence from general anesthesia     PVD (peripheral [facility] disease) [facility]     Thyroid disease        Past Surgical History:   Procedure Laterality Date    CARDIAC SURGERY       SECTION      COLONOSCOPY N/A 2019    COLONOSCOPY DIAGNOSTIC performed by Zuly Palafox MD at [address] [place] [address] GRAFT  2019    unknown vessels    HYSTERECTOMY      TOE AMPUTATION Right     3rd toe       Prior to Admission medications    Medication Sig Start Date End Date Taking? Authorizing Provider   clopidogrel (PLAVIX) 75 MG tablet Take 75 mg by mouth daily   Yes Historical Provider, MD   traMADol (ULTRAM) 50 MG tablet Take 50 mg by mouth 3 times daily.  Take with tylenol   Yes Historical Provider, MD   insulin lispro, 1 Unit Dial, (HUMALOG KWIKPEN) 100 UNIT/ML SOPN 10 units at each meals 20  Yes Meganrenee Luna MD   OXYGEN 2 lit O2 with sleep , please give O2 concentrator 20  Yes Feli Muñiz MD   hydrOXYzine (VISTARIL) 25 MG capsule Take 50 mg by mouth 3 times daily as needed  3/4/20  Yes Historical Provider, MD   Nutritional Supplements ( [address]) LIQD take as directed three times a day 20  Yes Historical Provider, MD   insulin glargine (LANTUS) 100 UNIT/ML injection vial Inject 30 Units into the skin nightly  Patient taking differently: Inject 35 Units into the skin nightly  5/12/20  Yes Talisha aBshir MD   isosorbide dinitrate (ISORDIL) 20 MG tablet Take 1 tablet by mouth 3 times daily 4/28/20  Yes Esteban Lutz MD   nitroGLYCERIN (NITROSTAT) 0.4 MG SL tablet up to max of 3 total doses. If no relief after 1 dose, call 911. 4/28/20  Yes Esteban Lutz MD   hydrALAZINE (APRESOLINE) 50 MG tablet Take 1 tablet by mouth every 8 hours 4/28/20  Yes Esteban Lutz MD   atorvastatin (LIPITOR) 40 MG tablet Take 2 tablets by mouth daily 4/28/20  Yes Esteban Lutz MD   metoprolol succinate (TOPROL XL) 50 MG extended release tablet Take 1 tablet by mouth 2 times daily 4/28/20  Yes Esteban Lutz MD   ticagrelor (BRILINTA) 90 MG TABS tablet Take 1 tablet by mouth 2 times daily 4/28/20  Yes Esteban Lutz MD   mupirocin (BACTROBAN) 2 % ointment Apply topically 3 times daily Apply topically 3 times daily to affected area   Yes Historical Provider, MD   amitriptyline (ELAVIL) 25 MG tablet Take 25 mg by mouth nightly   Yes Historical Provider, MD   albuterol sulfate HFA (VENTOLIN HFA) 108 (90 Base) MCG/ACT inhaler Inhale 2 puffs into the lungs as needed for Wheezing Every 4-6 hours PRN   Yes Historical Provider, MD   acetaminophen (TYLENOL) 500 MG tablet Take 500 mg by mouth 3 times daily Take with Tramadol   Yes Historical Provider, MD   ALPRAZolam (XANAX) 1 MG tablet Take 2 mg by mouth nightly as needed for Sleep. Yes Historical Provider, MD   gabapentin (NEURONTIN) 400 MG capsule Take 400 mg by mouth 2 times daily.  AM and 800 mg in pm-9pm   Yes Historical Provider, MD   sertraline (ZOLOFT) 50 MG tablet Take 4 tablets by mouth daily 2/12/20  Yes Chandrika Lemos DO   haloperidol (HALDOL) 5 MG tablet Take 5 mg by mouth daily   Yes Historical Provider, MD   aspirin 81 MG chewable tablet Take 81 mg by mouth daily   Yes Historical Provider, MD   folic acid (FOLVITE) 1 MG tablet Take 1 mg by mouth daily   Yes Historical Provider, MD   Iron Polysacch [id number] (NIFEREX-150 FORTE PO) Take 1 tablet by mouth daily    Yes Historical Provider, MD   Cyanocobalamin (VITAMIN B-12) 1000 MCG extended release tablet Take 1,000 mcg by mouth daily   Yes Historical Provider, MD   Cholecalciferol (VITAMIN D3) 72460 units CAPS Take 1 capsule by mouth once a week Indications: weekly on Sun    Yes Historical Provider, MD   Cholecalciferol (VITAMIN D) 2000 units CAPS capsule Take 2,000 Units by mouth daily    Yes Historical Provider, MD   insulin lispro (HUMALOG) 100 UNIT/ML injection vial Inject 0-6 Units into the skin 3 times daily (with meals) 2/28/19  Yes ESDRAS Lombardi   levothyroxine (SYNTHROID) 50 MCG tablet Take 50 mcg by mouth Daily   Yes Historical Provider, MD   furosemide (LASIX) 40 MG tablet Take 40 mg by mouth daily Indications: M-W-F    Yes Historical Provider, MD   meclizine (ANTIVERT) 25 MG tablet Take 25 mg by mouth three times daily   Yes Historical Provider, MD   Insulin Pen Needle (NOVOFINE) 32G X 6 MM MISC qid 6/18/20   Caitlyn Corrales MD   Continuous Blood Gluc  (FREESTYLE FELY 14 DAY READER) CATHLEEN As directed 6/18/20   Caitlyn Corrales MD   Continuous Blood Gluc Sensor (FREESTYLE FELY 14 DAY SENSOR) MISC Every 2 weeks 6/18/20   Caitlyn Corrales MD   clotrimazole (LOTRIMIN) 1 % cream apply to affected area twice a day IN THE MORNING AND IN THE EVENING 3/26/20   Historical Provider, MD   Emollient (EUCERIN INTENSIVE REPAIR HAND) 2.5-10 % CREA APPLY TO FEET AT BEDTIME; PLACE SOCKS OVER FEET AFTER APPLICATION IF NEEDED FOR DRY CRACKING FEET 3/15/20   Historical Provider, MD   FreeStyle Lancets MISC 1 each by Does not apply route daily 1/30/20   Caitlyn Corrales MD   blood glucose test strips (FREESTYLE LITE) strip 1 each by In Vitro route daily As needed.  1/30/20   Caitlyn Corrales MD   Alcohol Swabs (ALCOHOL PREP) 70 % PADS qid  Patient taking differently: Apply 1 Physical activity     Days per week: Not on file     Minutes per session: Not on file    Stress: Not on file   Relationships    Social connections     Talks on phone: Not on file     Gets together: Not on file     Attends Synagogue service: Not on file     Active member of club or organization: Not on file     Attends meetings of clubs or organizations: Not on file     Relationship status: Not on file    Intimate partner violence     Fear of current or ex partner: Not on file     Emotionally abused: Not on file     Physically abused: Not on file     Forced sexual activity: Not on file   Other Topics Concern    Not on file   Social History Narrative         Lives With: Spouse    Type of Home: [address] [place] [address] Street: One level    Home Access: Elevator    Bathroom Shower/Tub: Tub/Shower unit(Simultaneous filing. User may not have seen previous data.)    Bathroom Equipment: Grab bars in shower, Shower chair    Home Equipment: Rolling walker, [address] 450 bed    ADL Assistance: Needs assistance    Homemaking Assistance: Needs assistance    Homemaking Responsibilities: No    Ambulation Assistance: Independent    Transfer Assistance: Independent    Active : No    Additional Comments: Pt wears orthopedic shoes at baseline    The patient states she is current with [facility](RN per the ). The patient had a walker, but obtained a hospital bed, wheel chair, BSC and O2 last admission (from [address] [place] [address] Road). pharmacy is [address] [place] [address],[address] [address]., [facility]. Transportation is provided by [employer]	[place] () per the patient       History reviewed. No pertinent family history. Review Of Systems:    14 point ROS negative other than mentioned.      Physical Exam:    CURRENT VITALS: BP (!) 130/91   Pulse 55   Temp 97 °F (36.1 °C) (Oral)   Resp 20   Ht 5' 2\" (1.575 m)   Wt 247 lb 5 oz (112.2 kg)   SpO2 97%   BMI 45.23 kg/m² - 16.0 g/dL    Hematocrit 27.6 (L) 37.0 - 47.0 %    MCV 72.8 (L) 82.0 - 100.0 fL    MCH 24.5 (L) 27.0 - 31.3 pg    MCHC 33.6 33.0 - 37.0 %    RDW 17.2 (H) 11.5 - 14.5 %    Platelets 351 441 - 053 K/uL    PLATELET SLIDE REVIEW Normal     SLIDE REVIEW see below     Neutrophils % 74.3 %    Lymphocytes % 16.3 %    Monocytes % 7.2 %    Eosinophils % 1.0 %    Basophils % 1.2 %    Neutrophils Absolute 6.6 (H) 1.4 - 6.5 K/uL    Lymphocytes Absolute 1.4 1.0 - 4.8 K/uL    Monocytes Absolute 0.6 0.2 - 0.8 K/uL    Eosinophils Absolute 0.1 0.0 - 0.7 K/uL    Basophils Absolute 0.1 0.0 - 0.2 K/uL    Vacuolated Neutrophils Present     Anisocytosis 1+     Microcytes 1+     Polychromasia 1+     Hypochromia 2+     Poikilocytes 1+     Target Cells 1+    APTT    Collection Time: 06/28/20  5:30 AM   Result Value Ref Range    aPTT 25.4 24.4 - 36.8 sec   Protime-INR    Collection Time: 06/28/20  5:30 AM   Result Value Ref Range    Protime 14.1 12.3 - 14.9 sec    INR 1.1        ECG:     SR, PRWP, No changes.      ECHO:    Pending        Gisel Emerson MD  [facility] Cardiologist      Electronically signed on 6/28/20 at 1:06 PM EDT      -----

## 2020-06-29 ENCOUNTER — APPOINTMENT (OUTPATIENT)
Dept: GENERAL RADIOLOGY | Age: 65
End: 2020-06-29
Payer: COMMERCIAL

## 2020-06-29 VITALS
BODY MASS INDEX: 45.51 KG/M2 | RESPIRATION RATE: 18 BRPM | HEIGHT: 62 IN | SYSTOLIC BLOOD PRESSURE: 125 MMHG | OXYGEN SATURATION: 92 % | HEART RATE: 59 BPM | WEIGHT: 247.3 LBS | TEMPERATURE: 98.1 F | DIASTOLIC BLOOD PRESSURE: 52 MMHG

## 2020-06-29 LAB
ALBUMIN SERPL-MCNC: 3.5 G/DL (ref 3.5–4.6)
ALP BLD-CCNC: 112 U/L (ref 40–130)
ALT SERPL-CCNC: 30 U/L (ref 0–33)
ANION GAP SERPL CALCULATED.3IONS-SCNC: 8 MEQ/L (ref 9–15)
AST SERPL-CCNC: 21 U/L (ref 0–35)
BILIRUB SERPL-MCNC: 0.7 MG/DL (ref 0.2–0.7)
BUN BLDV-MCNC: 28 MG/DL (ref 8–23)
C-REACTIVE PROTEIN, HIGH SENSITIVITY: 23.6 MG/L (ref 0–5)
CALCIUM SERPL-MCNC: 9.6 MG/DL (ref 8.5–9.9)
CHLORIDE BLD-SCNC: 99 MEQ/L (ref 95–107)
CHOLESTEROL, TOTAL: 131 MG/DL (ref 0–199)
CO2: 28 MEQ/L (ref 20–31)
CREAT SERPL-MCNC: 1.12 MG/DL (ref 0.5–0.9)
EKG ATRIAL RATE: 57 BPM
EKG ATRIAL RATE: 62 BPM
EKG P AXIS: 24 DEGREES
EKG P AXIS: 54 DEGREES
EKG P-R INTERVAL: 176 MS
EKG P-R INTERVAL: 182 MS
EKG Q-T INTERVAL: 420 MS
EKG Q-T INTERVAL: 458 MS
EKG QRS DURATION: 96 MS
EKG QRS DURATION: 98 MS
EKG QTC CALCULATION (BAZETT): 426 MS
EKG QTC CALCULATION (BAZETT): 445 MS
EKG R AXIS: 61 DEGREES
EKG R AXIS: 78 DEGREES
EKG T AXIS: -85 DEGREES
EKG T AXIS: 254 DEGREES
EKG VENTRICULAR RATE: 57 BPM
EKG VENTRICULAR RATE: 62 BPM
GFR AFRICAN AMERICAN: 59.5
GFR NON-AFRICAN AMERICAN: 49.2
GLOBULIN: 3.4 G/DL (ref 2.3–3.5)
GLUCOSE BLD-MCNC: 132 MG/DL (ref 60–115)
GLUCOSE BLD-MCNC: 145 MG/DL (ref 60–115)
GLUCOSE BLD-MCNC: 165 MG/DL (ref 70–99)
GLUCOSE BLD-MCNC: 198 MG/DL (ref 60–115)
GLUCOSE BLD-MCNC: 317 MG/DL (ref 60–115)
HCT VFR BLD CALC: 29.7 % (ref 37–47)
HDLC SERPL-MCNC: 37 MG/DL (ref 40–59)
HEMOGLOBIN: 9.7 G/DL (ref 12–16)
LDL CHOLESTEROL CALCULATED: 76 MG/DL (ref 0–129)
MAGNESIUM: 2.5 MG/DL (ref 1.7–2.4)
MCH RBC QN AUTO: 24.3 PG (ref 27–31.3)
MCHC RBC AUTO-ENTMCNC: 32.6 % (ref 33–37)
MCV RBC AUTO: 74.6 FL (ref 82–100)
PDW BLD-RTO: 17.4 % (ref 11.5–14.5)
PERFORMED ON: ABNORMAL
PLATELET # BLD: 249 K/UL (ref 130–400)
POTASSIUM SERPL-SCNC: 4.4 MEQ/L (ref 3.4–4.9)
PRO-BNP: 5639 PG/ML
RBC # BLD: 3.98 M/UL (ref 4.2–5.4)
SEDIMENTATION RATE, ERYTHROCYTE: 67 MM (ref 0–30)
SODIUM BLD-SCNC: 135 MEQ/L (ref 135–144)
TOTAL PROTEIN: 6.9 G/DL (ref 6.3–8)
TRIGL SERPL-MCNC: 90 MG/DL (ref 0–150)
TROPONIN: <0.01 NG/ML (ref 0–0.01)
TROPONIN: <0.01 NG/ML (ref 0–0.01)
WBC # BLD: 9.6 K/UL (ref 4.8–10.8)

## 2020-06-29 PROCEDURE — 2580000003 HC RX 258: Performed by: INTERNAL MEDICINE

## 2020-06-29 PROCEDURE — 83735 ASSAY OF MAGNESIUM: CPT

## 2020-06-29 PROCEDURE — 6360000002 HC RX W HCPCS: Performed by: EMERGENCY MEDICINE

## 2020-06-29 PROCEDURE — 71046 X-RAY EXAM CHEST 2 VIEWS: CPT

## 2020-06-29 PROCEDURE — G0378 HOSPITAL OBSERVATION PER HR: HCPCS

## 2020-06-29 PROCEDURE — 93306 TTE W/DOPPLER COMPLETE: CPT

## 2020-06-29 PROCEDURE — 36415 COLL VENOUS BLD VENIPUNCTURE: CPT

## 2020-06-29 PROCEDURE — 83880 ASSAY OF NATRIURETIC PEPTIDE: CPT

## 2020-06-29 PROCEDURE — 2700000000 HC OXYGEN THERAPY PER DAY

## 2020-06-29 PROCEDURE — 6370000000 HC RX 637 (ALT 250 FOR IP): Performed by: INTERNAL MEDICINE

## 2020-06-29 PROCEDURE — 80061 LIPID PANEL: CPT

## 2020-06-29 PROCEDURE — 85652 RBC SED RATE AUTOMATED: CPT

## 2020-06-29 PROCEDURE — 80053 COMPREHEN METABOLIC PANEL: CPT

## 2020-06-29 PROCEDURE — 93010 ELECTROCARDIOGRAM REPORT: CPT | Performed by: INTERNAL MEDICINE

## 2020-06-29 PROCEDURE — 96372 THER/PROPH/DIAG INJ SC/IM: CPT

## 2020-06-29 PROCEDURE — 86141 C-REACTIVE PROTEIN HS: CPT

## 2020-06-29 PROCEDURE — 84484 ASSAY OF TROPONIN QUANT: CPT

## 2020-06-29 PROCEDURE — 93005 ELECTROCARDIOGRAM TRACING: CPT | Performed by: INTERNAL MEDICINE

## 2020-06-29 PROCEDURE — 93005 ELECTROCARDIOGRAM TRACING: CPT | Performed by: EMERGENCY MEDICINE

## 2020-06-29 PROCEDURE — 85027 COMPLETE CBC AUTOMATED: CPT

## 2020-06-29 RX ORDER — ASPIRIN 81 MG/1
81 TABLET ORAL DAILY
Qty: 30 TABLET | Refills: 3 | Status: SHIPPED | OUTPATIENT
Start: 2020-06-30

## 2020-06-29 RX ORDER — ATORVASTATIN CALCIUM 80 MG/1
80 TABLET, FILM COATED ORAL NIGHTLY
Qty: 30 TABLET | Refills: 3 | Status: SHIPPED | OUTPATIENT
Start: 2020-06-29 | End: 2021-03-10 | Stop reason: DRUGHIGH

## 2020-06-29 RX ADMIN — TRAMADOL HYDROCHLORIDE 50 MG: 50 TABLET, FILM COATED ORAL at 09:23

## 2020-06-29 RX ADMIN — ISOSORBIDE DINITRATE 20 MG: 10 TABLET ORAL at 15:13

## 2020-06-29 RX ADMIN — INSULIN LISPRO 2 UNITS: 100 INJECTION, SOLUTION INTRAVENOUS; SUBCUTANEOUS at 18:04

## 2020-06-29 RX ADMIN — ISOSORBIDE DINITRATE 20 MG: 10 TABLET ORAL at 09:23

## 2020-06-29 RX ADMIN — ASPIRIN 81 MG: 81 TABLET, COATED ORAL at 09:23

## 2020-06-29 RX ADMIN — ENOXAPARIN SODIUM 40 MG: 40 INJECTION SUBCUTANEOUS at 05:54

## 2020-06-29 RX ADMIN — LEVOTHYROXINE SODIUM 50 MCG: 0.05 TABLET ORAL at 05:54

## 2020-06-29 RX ADMIN — FOLIC ACID 1 MG: 1 TABLET ORAL at 09:23

## 2020-06-29 RX ADMIN — TRAMADOL HYDROCHLORIDE 50 MG: 50 TABLET, FILM COATED ORAL at 15:13

## 2020-06-29 RX ADMIN — INSULIN LISPRO 8 UNITS: 100 INJECTION, SOLUTION INTRAVENOUS; SUBCUTANEOUS at 12:34

## 2020-06-29 RX ADMIN — GABAPENTIN 400 MG: 400 CAPSULE ORAL at 09:23

## 2020-06-29 RX ADMIN — VITAMIN D, TAB 1000IU (100/BT) 2000 UNITS: 25 TAB at 09:23

## 2020-06-29 RX ADMIN — HYDRALAZINE HYDROCHLORIDE 50 MG: 50 TABLET, FILM COATED ORAL at 05:54

## 2020-06-29 RX ADMIN — Medication 10 ML: at 09:24

## 2020-06-29 RX ADMIN — HYDRALAZINE HYDROCHLORIDE 50 MG: 50 TABLET, FILM COATED ORAL at 15:13

## 2020-06-29 RX ADMIN — METOPROLOL SUCCINATE 50 MG: 50 TABLET, EXTENDED RELEASE ORAL at 09:23

## 2020-06-29 RX ADMIN — MECLIZINE HYDROCHLORIDE 25 MG: 25 TABLET ORAL at 09:23

## 2020-06-29 RX ADMIN — CLOPIDOGREL BISULFATE 75 MG: 75 TABLET ORAL at 09:23

## 2020-06-29 RX ADMIN — MECLIZINE HYDROCHLORIDE 25 MG: 25 TABLET ORAL at 15:13

## 2020-06-29 RX ADMIN — SERTRALINE 200 MG: 100 TABLET, FILM COATED ORAL at 09:23

## 2020-06-29 RX ADMIN — INSULIN LISPRO 2 UNITS: 100 INJECTION, SOLUTION INTRAVENOUS; SUBCUTANEOUS at 09:24

## 2020-06-29 RX ADMIN — FUROSEMIDE 40 MG: 40 TABLET ORAL at 09:23

## 2020-06-29 RX ADMIN — CYANOCOBALAMIN TAB 500 MCG 1000 MCG: 500 TAB at 09:23

## 2020-06-29 ASSESSMENT — PAIN SCALES - GENERAL
PAINLEVEL_OUTOF10: 3
PAINLEVEL_OUTOF10: 0

## 2020-06-29 NOTE — PROGRESS NOTES
[facility] Progress Note        Date:   2020    Patient:    Darlin Bee    :    1955  CSN:    [id number]    Rounding Cardiologist: Ronnie Cain MD     Primary Cardiologist: Ronnie Cain      Requesting Physician:  Michelle Guerra DO      Reason for Initial Consult:  CP / SOB      Subjective:    Still with Reproducible CP. Troponins negative x 3. No other complaints. Primary addressing Anemia and other non cardiac issues. Primary to determine discharge plans. 14 system General and Cardiac ROS otherwise negative or unchanged. Assessment:    1. Chest pain, reproducible, atypical, c/w costochondritis, R/O'd acute coronary syndrome. 2. Shortness of breath, resolved. 3. Anemia, H 9.4  4. Negative troponin, no acute EKG abnormality, doubt ACS. 5. Congestive heart failure, systolic, history, no apparent decompensation. 6. ICM, LVEF 45%  7. MR, 3+  8. CAD, Prior CABG, Status post PCI / stent of the ostial circumflex 20.   9. HTN  10. HLP  11. DM.  12. PHTN  13. Renal insufficiency  14. PVOD bilateral Legs, Post revascularization [facility] prior. 15. Probable JESICA    Plan:    1. Cardiac Supportive Care  2. R/O'd for ACS, no further cardiac evaluation needed this admit. 3. OK to DC from CV point once ok with others. 4. Primary service for medical and to evaluate anemia. Rule out noncardiac causes. 5. Continue current home medications. 6. Hydration. 7. Cardiology sign off.  8. Follow up as prior scheduled. 9. See Orders      HISTORY OF PRESENT ILLNESS:      Darlin Bee is a pleasant 60 y.o. female with the above-noted past cardiovascular and general medical history who presented to the emergency room with with prolonged recurrent chest discomfort symptoms. She described as midsternal and radiating to her shoulders. She did have some shortness of breath as well.   Given her cardiovascular history above she is now admitted for further evaluation. Initial troponin is negative. Her symptoms are somewhat atypical and even reproducible on palpation. Given her prior cardiovascular cardiology was asked to see in consultation. Patient Follows with Linda Randolph MD [facility]    Patient History and Records, EMR reviewed. Patient Interviewed and examined. Poor historian given language barrier. Denies LH, Dizziness, TIA or CVA Symptoms. No Orthopnea, Edema or CHF symptoms. No Palpitations. No Syncope. No Fever, Chills or Cold symptoms. No GI,  or Bleeding complaints. Cardiac and general ROS otherwise negative. [address] otherwise negative other than noted. Past Medical History:   Diagnosis Date    Asthma     CAD (coronary artery disease)     CKD (chronic kidney disease) stage 3, GFR 30-59 ml/min ([facility])     Colitis     Diabetes mellitus ([facility]     Hyperlipidemia     Hypertension     PAD (peripheral artery disease) ([facility])     Prolonged emergence from general anesthesia     PVD (peripheral vascular disease) ([facility]     Thyroid disease        Past Surgical History:   Procedure Laterality Date    CARDIAC SURGERY       SECTION      COLONOSCOPY N/A 2019    COLONOSCOPY DIAGNOSTIC performed by Ese Sosa MD at [address] [place] [address] GRAFT  2019    unknown vessels    HYSTERECTOMY      TOE AMPUTATION Right     3rd toe       Prior to Admission medications    Medication Sig Start Date End Date Taking? Authorizing Provider   clopidogrel (PLAVIX) 75 MG tablet Take 75 mg by mouth daily   Yes Historical Provider, MD   traMADol (ULTRAM) 50 MG tablet Take 50 mg by mouth 3 times daily.  Take with tylenol   Yes Historical Provider, MD   insulin lispro, 1 Unit Dial, (HUMALOG KWIKPEN) 100 UNIT/ML SOPN 10 units at each meals 20  Yes Brain Lewis MD   OXYGEN 2 lit O2 with sleep , please give O2 concentrator 20  Yes Erin Pro MD   hydrOXYzine (VISTARIL) 25 MG capsule Take 50 mg by mouth 3 times daily as needed  3/4/20  Yes Historical Provider, MD   Nutritional Supplements ([address]) LIQD take as directed three times a day 6/1/20  Yes Historical Provider, MD   insulin glargine (LANTUS) 100 UNIT/ML injection vial Inject 30 Units into the skin nightly  Patient taking differently: Inject 35 Units into the skin nightly  5/12/20  Yes Kevin Camarillo MD   isosorbide dinitrate (ISORDIL) 20 MG tablet Take 1 tablet by mouth 3 times daily 4/28/20  Yes Gisel Emerson MD   nitroGLYCERIN (NITROSTAT) 0.4 MG SL tablet up to max of 3 total doses. If no relief after 1 dose, call 911. 4/28/20  Yes Gisel Emerson MD   hydrALAZINE (APRESOLINE) 50 MG tablet Take 1 tablet by mouth every 8 hours 4/28/20  Yes Gisel Emerson MD   atorvastatin (LIPITOR) 40 MG tablet Take 2 tablets by mouth daily 4/28/20  Yes Gisel Emerson MD   metoprolol succinate (TOPROL XL) 50 MG extended release tablet Take 1 tablet by mouth 2 times daily 4/28/20  Yes Gisel Emerson MD   ticagrelor (BRILINTA) 90 MG TABS tablet Take 1 tablet by mouth 2 times daily 4/28/20  Yes Gisel Emerson MD   mupirocin (BACTROBAN) 2 % ointment Apply topically 3 times daily Apply topically 3 times daily to affected area   Yes Historical Provider, MD   amitriptyline (ELAVIL) 25 MG tablet Take 25 mg by mouth nightly   Yes Historical Provider, MD   albuterol sulfate HFA (VENTOLIN HFA) 108 (90 Base) MCG/ACT inhaler Inhale 2 puffs into the lungs as needed for Wheezing Every 4-6 hours PRN   Yes Historical Provider, MD   acetaminophen (TYLENOL) 500 MG tablet Take 500 mg by mouth 3 times daily Take with Tramadol   Yes Historical Provider, MD   ALPRAZolam (XANAX) 1 MG tablet Take 2 mg by mouth nightly as needed for Sleep. Yes Historical Provider, MD   gabapentin (NEURONTIN) 400 MG capsule Take 400 mg by mouth 2 times daily.  AM and 800 mg in pm-9pm   Yes Historical Provider, MD   sertraline (ZOLOFT) 50 MG tablet Take 4 tablets by mouth daily 2/12/20  Yes Florencia Alvarado DO   haloperidol (HALDOL) 5 MG tablet Take 5 mg by mouth daily   Yes Historical Provider, MD   aspirin 81 MG chewable tablet Take 81 mg by mouth daily   Yes Historical Provider, MD   folic acid (FOLVITE) 1 MG tablet Take 1 mg by mouth daily   Yes Historical Provider, MD   Iron Polysacch [id number]-[id number] (NIFEREX-150 FORTE PO) Take 1 tablet by mouth daily    Yes Historical Provider, MD   Cyanocobalamin (VITAMIN B-12) 1000 MCG extended release tablet Take 1,000 mcg by mouth daily   Yes Historical Provider, MD   Cholecalciferol (VITAMIN D3) 56846 units CAPS Take 1 capsule by mouth once a week Indications: weekly on Sun    Yes Historical Provider, MD   Cholecalciferol (VITAMIN D) 2000 units CAPS capsule Take 2,000 Units by mouth daily    Yes Historical Provider, MD   insulin lispro (HUMALOG) 100 UNIT/ML injection vial Inject 0-6 Units into the skin 3 times daily (with meals) 2/28/19  Yes ESDRAS Minor   levothyroxine (SYNTHROID) 50 MCG tablet Take 50 mcg by mouth Daily   Yes Historical Provider, MD   furosemide (LASIX) 40 MG tablet Take 40 mg by mouth daily Indications: M-W-F    Yes Historical Provider, MD   meclizine (ANTIVERT) 25 MG tablet Take 25 mg by mouth three times daily   Yes Historical Provider, MD   Insulin Pen Needle (NOVOFINE) 32G X 6 MM MISC qid 6/18/20   Jorge Rubin MD   Continuous Blood Gluc  (FREESTYLE FELY 14 DAY READER) CATHLEEN As directed 6/18/20   Jorge Rubin MD   Continuous Blood Gluc Sensor (FREESTYLE FELY 14 DAY SENSOR) MISC Every 2 weeks 6/18/20   Jorge Rubin MD   clotrimazole (LOTRIMIN) 1 % cream apply to affected area twice a day IN THE MORNING AND IN THE EVENING 3/26/20   Historical Provider, MD   Emollient (EUCERIN INTENSIVE REPAIR HAND) 2.5-10 % CREA APPLY TO FEET AT BEDTIME; PLACE SOCKS OVER FEET AFTER APPLICATION IF NEEDED FOR DRY CRACKING FEET 3/15/20   Historical Provider, MD   FreeStyle Lancets MISC 1 each by Does not apply route daily 1/30/20   Jerry Florez MD   blood glucose test strips (FREESTYLE LITE) strip 1 each by In Vitro route daily As needed.  1/30/20   Jerry Florez MD   Alcohol Swabs (ALCOHOL PREP) 70 % PADS qid  Patient taking differently: Apply 1 each topically 4 times daily qid 1/30/20   Jerry Florez MD   Insulin Pen Needle (NOVOFINE) 32G X 6 MM MISC Qid  Patient taking differently: 1 each 3 times daily Qid 1/30/20   Jerry Florez MD       Scheduled Meds:   sodium chloride flush  10 mL Intravenous 2 times per day    atorvastatin  80 mg Oral Nightly    enoxaparin  40 mg Subcutaneous Daily    insulin lispro  0-12 Units Subcutaneous TID WC    insulin lispro  0-6 Units Subcutaneous Nightly    amitriptyline  25 mg Oral Nightly    Vitamin D  2,000 Units Oral Daily    vitamin B-12  1,000 mcg Oral Daily    folic acid  1 mg Oral Daily    furosemide  40 mg Oral Daily    gabapentin  400 mg Oral BID    haloperidol  5 mg Oral Daily    hydrALAZINE  50 mg Oral 3 times per day    insulin glargine  35 Units Subcutaneous Nightly    isosorbide dinitrate  20 mg Oral TID    levothyroxine  50 mcg Oral Daily    meclizine  25 mg Oral TID    metoprolol succinate  50 mg Oral BID    sertraline  200 mg Oral Daily    traMADol  50 mg Oral TID    aspirin  81 mg Oral Daily    clopidogrel  75 mg Oral Daily     Continuous Infusions:   dextrose       PRN Meds:sodium chloride flush, acetaminophen, sodium chloride flush, acetaminophen **OR** acetaminophen, polyethylene glycol, promethazine **OR** ondansetron, nitroGLYCERIN, glucose, dextrose, glucagon (rDNA), dextrose, albuterol, ALPRAZolam, hydrOXYzine    Allergies   Allergen Reactions    Ambien [Zolpidem Tartrate]     Capoten [Captopril]     Clioquinol     Cogentin [Benztropine]     Depakote [Divalproex Sodium]     Effexor Xr [Venlafaxine Hcl Er]     Geodon [Ziprasidone Hcl]     Lisinopril      Hyperkalemia: 4/21/20 potassium was 6.7    Lyrica [Pregabalin]     Navane [Thiothixene]     Pamelor [Nortriptyline Hcl]     Remeron [Mirtazapine]     Risperdal [Risperidone]     Trazodone And Nefazodone     Wellbutrin [Bupropion]        Social History     Socioeconomic History    Marital status:      Spouse name: Not on file    Number of children: Not on file    Years of education: Not on file    Highest education level: Not on file   Occupational History    Not on file   Social Needs    Financial resource strain: Not on file    Food insecurity     Worry: Not on file     Inability: Not on file   [employer] needs     Medical: Not on file     Non-medical: Not on file   Tobacco Use    Smoking status: Never Smoker    Smokeless tobacco: Never Used   Substance and Sexual Activity    Alcohol use: Never     Frequency: Never    Drug use: Never    Sexual activity: Not on file   Lifestyle    Physical activity     Days per week: Not on file     Minutes per session: Not on file    Stress: Not on file   Relationships    Social connections     Talks on phone: Not on file     Gets together: Not on file     Attends Holiness service: Not on file     Active member of club or organization: Not on file     Attends meetings of clubs or organizations: Not on file     Relationship status: Not on file    Intimate partner violence     Fear of current or ex partner: Not on file     Emotionally abused: Not on file     Physically abused: Not on file     Forced sexual activity: Not on file   Other Topics Concern    Not on file   Social History Narrative         Lives With: Spouse    Type of Home: [address] [place] [address] [address]: One level    Home Access: Elevator    Bathroom Shower/Tub: Tub/Shower unit(Simultaneous filing.  User may not have seen previous data.)    Bathroom Equipment: Grab bars in shower, Shower chair    Home Equipment: Rolling walker, Joana 450 bed    ADL Assistance: Needs assistance    Homemaking Assistance: Needs 115 mg/dl    Performed on ACCU-CHEK    POCT Glucose    Collection Time: 06/28/20  8:02 PM   Result Value Ref Range    POC Glucose 165 (H) 60 - 115 mg/dl    Performed on ACCU-CHEK    POCT Glucose    Collection Time: 06/28/20 11:49 PM   Result Value Ref Range    POC Glucose 132 (H) 60 - 115 mg/dl    Performed on ACCU-CHEK    EKG 12 lead    Collection Time: 06/29/20  1:13 AM   Result Value Ref Range    Ventricular Rate 62 BPM    Atrial Rate 62 BPM    P-R Interval 176 ms    QRS Duration 98 ms    Q-T Interval 420 ms    QTc Calculation (Bazett) 426 ms    P Axis 54 degrees    R Axis 78 degrees    T Axis 254 degrees   POCT Glucose    Collection Time: 06/29/20  5:44 AM   Result Value Ref Range    POC Glucose 145 (H) 60 - 115 mg/dl    Performed on ACCU-CHEK    Troponin    Collection Time: 06/29/20  6:36 AM   Result Value Ref Range    Troponin <0.010 0.000 - 0.010 ng/mL   Brain natriuretic peptide    Collection Time: 06/29/20  6:36 AM   Result Value Ref Range    Pro-BNP 5,639 pg/mL   Magnesium    Collection Time: 06/29/20  6:36 AM   Result Value Ref Range    Magnesium 2.5 (H) 1.7 - 2.4 mg/dL   Lipid panel - fasting    Collection Time: 06/29/20  6:36 AM   Result Value Ref Range    Cholesterol, Total 131 0 - 199 mg/dL    Triglycerides 90 0 - 150 mg/dL    HDL 37 (L) 40 - 59 mg/dL    LDL Calculated 76 0 - 129 mg/dL   CBC    Collection Time: 06/29/20  6:36 AM   Result Value Ref Range    WBC 9.6 4.8 - 10.8 K/uL    RBC 3.98 (L) 4.20 - 5.40 M/uL    Hemoglobin 9.7 (L) 12.0 - 16.0 g/dL    Hematocrit 29.7 (L) 37.0 - 47.0 %    MCV 74.6 (L) 82.0 - 100.0 fL    MCH 24.3 (L) 27.0 - 31.3 pg    MCHC 32.6 (L) 33.0 - 37.0 %    RDW 17.4 (H) 11.5 - 14.5 %    Platelets 497 693 - 309 K/uL   Comprehensive Metabolic Panel    Collection Time: 06/29/20  6:36 AM   Result Value Ref Range    Sodium 135 135 - 144 mEq/L    Potassium 4.4 3.4 - 4.9 mEq/L    Chloride 99 95 - 107 mEq/L    CO2 28 20 - 31 mEq/L    Anion Gap 8 (L) 9 - 15 mEq/L    Glucose 165 (H) 70

## 2020-06-29 NOTE — DISCHARGE SUMMARY
Hospital Medicine Discharge Summary    Carla Larose  :  1955  MRN:  [id number]    Admit date:  2020  Discharge date:  2020    Admitting Physician:  Halina Briggs DO  Primary Care Physician:  Morena Sahu      Discharge Diagnoses:      Chest pain-noncardiac, musculoskeletal etiology-cardiology does not think this is related to the heart. Diabetes  CKD  Hypertension  Hyperlipidemia  PAD    Chief Complaint   Patient presents with    Chest Pain     x 1 day     Hospital Course:     Patient was admitted for chest pain rule out. ACS was ruled out. She was seen by cardiology. An echocardiogram was done. EKG and cardiac enzymes not consistent with ischemia. Patient was discharged home in a stable condition. She was educated on chest pain in the center symptom that she needs to watch out for and come back to the hospital if she has. She was instructed follow-up with cardiology and PCP as outpatient. Exam on discharge:   BP (!) 155/63   Pulse 68   Temp 99.5 °F (37.5 °C) (Oral)   Resp 18   Ht 5' 2\" (1.575 m)   Wt 247 lb 4.8 oz (112.2 kg)   SpO2 93%   BMI 45.23 kg/m²   General appearance: No apparent distress, appears stated age and cooperative. HEENT: Pupils equal, round, and reactive to light. Conjunctivae/corneas clear. Neck: Supple, with full range of motion. No jugular venous distention. Trachea midline. Respiratory:  Normal respiratory effort. Clear to auscultation, bilaterally without Rales/Wheezes/Rhonchi. Cardiovascular: Regular rate and rhythm with normal S1/S2 without murmurs, rubs or gallops. Abdomen: Soft, non-tender, non-distended with normal bowel sounds. Musculoskeletal: No clubbing, cyanosis or edema bilaterally. Full range of motion without deformity. Skin: Skin color, texture, turgor normal.  No rashes or lesions. Neuro: Non Focal. Symetrical motor and tone. Nl Comprehension, Alert,awake and oriented. NL CN.  Symetrical tone and reflexes. Psychiatric: Alert and oriented, thought content appropriate, normal insight  Capillary Refill: Brisk,< 3 seconds   Peripheral Pulses: +2 palpable, equal bilaterally     Patient was seen by the following consultants while admitted to [facility]:   Consults:  [address] TO CARDIOLOGY    Significant Diagnostic Studies:    Refer to chart     Please refer to chart if no studies are shown here    Xr Chest Portable    Result Date: 6/28/2020  EXAMINATION: XR CHEST PORTABLE CLINICAL HISTORY: CHEST PAIN COMPARISONS: CHEST RADIOGRAPH, ELENITA 15, 2020 FINDINGS: Rotated to left. Median sternotomy. Cardiac blood enlarged, unchanged. Pulmonary vasculature normal. Lungs clear. NO ACUTE CARDIOPULMONARY DISEASE. STABLE CARDIOMEGALY. Discharge Medications:       Sachin Rivero 115 Medication Instructions QTI:[id number]    Printed on:06/29/20 1129   Medication Information                      acetaminophen (TYLENOL) 500 MG tablet  Take 500 mg by mouth 3 times daily Take with Tramadol             albuterol sulfate HFA (VENTOLIN HFA) 108 (90 Base) MCG/ACT inhaler  Inhale 2 puffs into the lungs as needed for Wheezing Every 4-6 hours PRN             Alcohol Swabs (ALCOHOL PREP) 70 % PADS  qid             ALPRAZolam (XANAX) 1 MG tablet  Take 2 mg by mouth nightly as needed for Sleep.             amitriptyline (ELAVIL) 25 MG tablet  Take 25 mg by mouth nightly             aspirin 81 MG EC tablet  Take 1 tablet by mouth daily             atorvastatin (LIPITOR) 80 MG tablet  Take 1 tablet by mouth nightly             blood glucose test strips (FREESTYLE LITE) strip  1 each by In Vitro route daily As needed.              Cholecalciferol (VITAMIN D) 2000 units CAPS capsule  Take 2,000 Units by mouth daily              Cholecalciferol (VITAMIN D3) 25712 units CAPS  Take 1 capsule by mouth once a week Indications: weekly on Sun              clopidogrel (PLAVIX) 75 MG tablet  Take 75 mg by mouth daily             clotrimazole (LOTRIMIN) 1 % cream  apply to affected area twice a day IN THE MORNING AND IN THE EVENING             Continuous Blood Gluc  (FREESTYLE FELY 14 DAY READER) CATHLEEN  As directed             Continuous Blood Gluc Sensor (FREESTYLE FELY 14 DAY SENSOR) MISC  Every 2 weeks             Cyanocobalamin (VITAMIN B-12) 1000 MCG extended release tablet  Take 1,000 mcg by mouth daily             Emollient (EUCERIN INTENSIVE REPAIR HAND) 2.5-10 % CREA  APPLY TO FEET AT BEDTIME; PLACE SOCKS OVER FEET AFTER APPLICATION IF NEEDED FOR DRY CRACKING FEET             folic acid (FOLVITE) 1 MG tablet  Take 1 mg by mouth daily             FreeStyle Lancets MISC  1 each by Does not apply route daily             furosemide (LASIX) 40 MG tablet  Take 40 mg by mouth daily Indications: M-W-F              gabapentin (NEURONTIN) 400 MG capsule  Take 400 mg by mouth 2 times daily.  AM and 800 mg in pm-9pm             haloperidol (HALDOL) 5 MG tablet  Take 5 mg by mouth daily             hydrALAZINE (APRESOLINE) 50 MG tablet  Take 1 tablet by mouth every 8 hours             hydrOXYzine (VISTARIL) 25 MG capsule  Take 50 mg by mouth 3 times daily as needed              insulin glargine (LANTUS) 100 UNIT/ML injection vial  Inject 30 Units into the skin nightly             insulin lispro (HUMALOG) 100 UNIT/ML injection vial  Inject 0-6 Units into the skin 3 times daily (with meals)             insulin lispro, 1 Unit Dial, (HUMALOG KWIKPEN) 100 UNIT/ML SOPN  10 units at each meals             Insulin Pen Needle (NOVOFINE) 32G X 6 MM MISC  Qid             Insulin Pen Needle (NOVOFINE) 32G X 6 MM MISC  qid             Iron Polysacch [id number]-F42-[id number] (NIFEREX-150 FORTE PO)  Take 1 tablet by mouth daily              isosorbide dinitrate (ISORDIL) 20 MG tablet  Take 1 tablet by mouth 3 times daily             levothyroxine (SYNTHROID) 50 MCG tablet  Take 50 mcg by mouth Daily meclizine (ANTIVERT) 25 MG tablet  Take 25 mg by mouth three times daily             metoprolol succinate (TOPROL XL) 50 MG extended release tablet  Take 1 tablet by mouth 2 times daily             mupirocin (BACTROBAN) 2 % ointment  Apply topically 3 times daily Apply topically 3 times daily to affected area             nitroGLYCERIN (NITROSTAT) 0.4 MG SL tablet  up to max of 3 total doses. If no relief after 1 dose, call 911. Nutritional Supplements (GLUCERNA SHAKE) LIQD  take as directed three times a day             OXYGEN  2 lit O2 with sleep , please give O2 concentrator             sertraline (ZOLOFT) 50 MG tablet  Take 4 tablets by mouth daily             traMADol (ULTRAM) 50 MG tablet  Take 50 mg by mouth 3 times daily. Take with tylenol                 Disposition:   If discharged to Home, Any [facility] needs that were indicated and/or required as been addressed and set up by Social Work. Condition at discharge: good     Activity: activity as tolerated    Total time taken for discharging this patient: 40 minutes. Greater than 70% of time was spent focused exclusively on this patient. Time was taken to review chart, discuss plans with consultants, reconciling medications, discussing plan answering questions with patient.      Eric Garcia  6/29/2020, 11:29 AM  ----------------------------------------------------------------------------------------------------------------------    Hossein Atkinson

## 2020-08-20 ENCOUNTER — OFFICE VISIT (OUTPATIENT)
Dept: PULMONOLOGY | Age: 65
End: 2020-08-20
Payer: COMMERCIAL

## 2020-08-20 VITALS
HEART RATE: 70 BPM | WEIGHT: 241 LBS | HEIGHT: 65 IN | BODY MASS INDEX: 40.15 KG/M2 | OXYGEN SATURATION: 92 % | SYSTOLIC BLOOD PRESSURE: 110 MMHG | DIASTOLIC BLOOD PRESSURE: 64 MMHG

## 2020-08-20 PROCEDURE — 1036F TOBACCO NON-USER: CPT | Performed by: INTERNAL MEDICINE

## 2020-08-20 PROCEDURE — G8427 DOCREV CUR MEDS BY ELIG CLIN: HCPCS | Performed by: INTERNAL MEDICINE

## 2020-08-20 PROCEDURE — G8417 CALC BMI ABV UP PARAM F/U: HCPCS | Performed by: INTERNAL MEDICINE

## 2020-08-20 PROCEDURE — 3017F COLORECTAL CA SCREEN DOC REV: CPT | Performed by: INTERNAL MEDICINE

## 2020-08-20 PROCEDURE — 99214 OFFICE O/P EST MOD 30 MIN: CPT | Performed by: INTERNAL MEDICINE

## 2020-08-20 RX ORDER — ALBUTEROL SULFATE 2.5 MG/3ML
2.5 SOLUTION RESPIRATORY (INHALATION) EVERY 6 HOURS PRN
Qty: 120 EACH | Refills: 5 | Status: ON HOLD | OUTPATIENT
Start: 2020-08-20 | End: 2020-09-29

## 2020-08-20 RX ORDER — MONTELUKAST SODIUM 10 MG/1
10 TABLET ORAL NIGHTLY
Qty: 30 TABLET | Refills: 0 | Status: SHIPPED | OUTPATIENT
Start: 2020-08-20 | End: 2020-09-18

## 2020-08-20 ASSESSMENT — ENCOUNTER SYMPTOMS
RHINORRHEA: 0
CHEST TIGHTNESS: 0
COUGH: 1
VOMITING: 0
DIARRHEA: 0
SORE THROAT: 0
VOICE CHANGE: 0
ABDOMINAL PAIN: 0
NAUSEA: 0
SINUS PRESSURE: 0
TROUBLE SWALLOWING: 0
WHEEZING: 1
EYE ITCHING: 0
EYE DISCHARGE: 0
SHORTNESS OF BREATH: 1

## 2020-08-20 NOTE — PROGRESS NOTES
file.  Social History     Socioeconomic History    Marital status:      Spouse name: Not on file    Number of children: Not on file    Years of education: Not on file    Highest education level: Not on file   Occupational History    Not on file   Social Needs    Financial resource strain: Not on file    Food insecurity     Worry: Not on file     Inability: Not on file    Transportation needs     Medical: Not on file     Non-medical: Not on file   Tobacco Use    Smoking status: Never Smoker    Smokeless tobacco: Never Used   Substance and Sexual Activity    Alcohol use: Never     Frequency: Never    Drug use: Never    Sexual activity: Not on file   Lifestyle    Physical activity     Days per week: Not on file     Minutes per session: Not on file    Stress: Not on file   Relationships    Social connections     Talks on phone: Not on file     Gets together: Not on file     Attends [employer] service: Not on file     Active member of club or organization: Not on file     Attends meetings of clubs or organizations: Not on file     Relationship status: Not on file    Intimate partner violence     Fear of current or ex partner: Not on file     Emotionally abused: Not on file     Physically abused: Not on file     Forced sexual activity: Not on file   Other Topics Concern    Not on file   Social History Narrative         Lives With: Spouse    Type of Home: [address] [place] [address] [address]: One level    Home Access: Elevator    Bathroom Shower/Tub: Tub/Shower unit(Simultaneous filing.  User may not have seen previous data.)    Bathroom Equipment: Grab bars in shower, Shower chair    Home Equipment: Rolling walker, [employer] 450 bed    ADL Assistance: Needs assistance    Homemaking Assistance: Needs assistance    Homemaking Responsibilities: No    Ambulation Assistance: Independent    Transfer Assistance: Independent    Active : No    Additional Comments: Pt wears orthopedic shoes at baseline    The patient states she is current with [facility](RN per the ). The patient had a walker, but obtained a hospital bed, wheel chair, BSC and O2 last admission (from [address]). pharmacy is [address] [place] [address],[address] [address]., [place]. Transportation is provided by [employer]	[facility] () per the patient         Review of Systems   Constitutional: Negative for chills, diaphoresis, fatigue and fever. HENT: Negative for congestion, mouth sores, nosebleeds, postnasal drip, rhinorrhea, sinus pressure, sneezing, sore throat, trouble swallowing and voice change. Snoring    Eyes: Negative for discharge, itching and visual disturbance. Respiratory: Positive for cough, shortness of breath and wheezing. Negative for chest tightness. Cardiovascular: Negative for chest pain, palpitations and leg swelling. Gastrointestinal: Negative for abdominal pain, diarrhea, nausea and vomiting. Genitourinary: Negative for difficulty urinating and hematuria. Musculoskeletal: Negative for arthralgias, joint swelling and myalgias. Skin: Negative for rash. Allergic/Immunologic: Negative for environmental allergies and food allergies. Neurological: Negative for dizziness, tremors, weakness and headaches. Psychiatric/Behavioral: Positive for sleep disturbance. Negative for behavioral problems. :     Vitals:    08/20/20 1325   BP: 110/64   Site: Right Upper Arm   Position: Sitting   Cuff Size: Medium Adult   Pulse: 70   SpO2: 92%   Weight: 241 lb (109.3 kg)   Height: 5' 5\" (1.651 m)     Wt Readings from Last 3 Encounters:   08/20/20 241 lb (109.3 kg)   06/29/20 247 lb 4.8 oz (112.2 kg)   06/18/20 238 lb (108 kg)         Physical Exam  Constitutional:       General: She is not in acute distress. Appearance: She is well-developed. She is obese. She is not diaphoretic. HENT:      Head: Normocephalic and atraumatic.       Nose: Nose normal.   Eyes:      Pupils: Pupils are equal, round, and reactive to light. Neck:      Thyroid: No thyromegaly. Vascular: No JVD. Trachea: No tracheal deviation. Cardiovascular:      Rate and Rhythm: Normal rate and regular rhythm. Heart sounds: No murmur. No friction rub. No gallop. Pulmonary:      Effort: No respiratory distress. Breath sounds: No wheezing or rales. Comments: diminished Breath sound bilaterally. Chest:      Chest wall: No tenderness. Abdominal:      General: There is no distension. Tenderness: There is no abdominal tenderness. There is no rebound. Musculoskeletal: Normal range of motion. Lymphadenopathy:      Cervical: No cervical adenopathy. Skin:     General: Skin is warm and dry. Neurological:      Mental Status: She is alert and oriented to person, place, and time.       Coordination: Coordination normal.         Current Outpatient Medications   Medication Sig Dispense Refill    metFORMIN (GLUCOPHAGE) 1000 MG tablet Take 1,000 mg by mouth 2 times daily (with meals)      ticagrelor (BRILINTA) 90 MG TABS tablet Take 90 mg by mouth 2 times daily      CPAP Machine MISC by Does not apply route New CPAP with 10 cm 1 each 0    albuterol (PROVENTIL) (2.5 MG/3ML) 0.083% nebulizer solution Take 3 mLs by nebulization every 6 hours as needed for Wheezing 120 each 5    montelukast (SINGULAIR) 10 MG tablet Take 1 tablet by mouth nightly 30 tablet 0    aspirin 81 MG EC tablet Take 1 tablet by mouth daily 30 tablet 3    atorvastatin (LIPITOR) 80 MG tablet Take 1 tablet by mouth nightly 30 tablet 3    clopidogrel (PLAVIX) 75 MG tablet Take 75 mg by mouth daily      Insulin Pen Needle (NOVOFINE) 32G X 6 MM MISC qid 300 each 3    Continuous Blood Gluc  (FREESTYLE FELY 14 DAY READER) CATHLEEN As directed 1 Device 00    Continuous Blood Gluc Sensor (FREESTYLE FELY 14 DAY SENSOR) MISC Every 2 weeks 2 each 06    OXYGEN 2 lit O2 with sleep , please give O2 concentrator 1 Units 0    clotrimazole (LOTRIMIN) 1 % cream apply to affected area twice a day IN THE MORNING AND IN THE EVENING      Emollient (EUCERIN INTENSIVE REPAIR HAND) 2.5-10 % CREA APPLY TO FEET AT BEDTIME; PLACE SOCKS OVER FEET AFTER APPLICATION IF NEEDED FOR DRY CRACKING FEET      insulin glargine (LANTUS) 100 UNIT/ML injection vial Inject 30 Units into the skin nightly (Patient taking differently: Inject 35 Units into the skin nightly ) 1 vial 3    isosorbide dinitrate (ISORDIL) 20 MG tablet Take 1 tablet by mouth 3 times daily 90 tablet 3    nitroGLYCERIN (NITROSTAT) 0.4 MG SL tablet up to max of 3 total doses. If no relief after 1 dose, call 911. 25 tablet 3    hydrALAZINE (APRESOLINE) 50 MG tablet Take 1 tablet by mouth every 8 hours 90 tablet 3    metoprolol succinate (TOPROL XL) 50 MG extended release tablet Take 1 tablet by mouth 2 times daily 30 tablet 3    albuterol sulfate HFA (VENTOLIN HFA) 108 (90 Base) MCG/ACT inhaler Inhale 2 puffs into the lungs as needed for Wheezing Every 4-6 hours PRN      acetaminophen (TYLENOL) 500 MG tablet Take 500 mg by mouth 3 times daily Take with Tramadol      gabapentin (NEURONTIN) 400 MG capsule Take 400 mg by mouth 2 times daily. AM and 800 mg in pm-9pm      FreeStyle Lancets MISC 1 each by Does not apply route daily 100 each 3    blood glucose test strips (FREESTYLE LITE) strip 1 each by In Vitro route daily As needed.  100 each 3    Alcohol Swabs (ALCOHOL PREP) 70 % PADS qid (Patient taking differently: Apply 1 each topically 4 times daily qid) 300 each 06    Insulin Pen Needle (NOVOFINE) 32G X 6 MM MISC Qid (Patient taking differently: 1 each 3 times daily Qid) 300 each 3    insulin lispro (HUMALOG) 100 UNIT/ML injection vial Inject 0-6 Units into the skin 3 times daily (with meals) 1 vial 3    levothyroxine (SYNTHROID) 50 MCG tablet Take 50 mcg by mouth Daily      furosemide (LASIX) 40 MG tablet Take 40 mg by mouth daily Indications: M-W-F       meclizine (ANTIVERT) 25 MG tablet Take 25 mg by mouth three times daily      traMADol (ULTRAM) 50 MG tablet Take 50 mg by mouth 3 times daily. Take with tylenol      insulin lispro, 1 Unit Dial, (HUMALOG KWIKPEN) 100 UNIT/ML SOPN 10 units at each meals (Patient not taking: Reported on 8/20/2020) 10 pen 03    hydrOXYzine (VISTARIL) 25 MG capsule Take 50 mg by mouth 3 times daily as needed       Nutritional Supplements (GLUCERNA SHAKE) LIQD take as directed three times a day      mupirocin (BACTROBAN) 2 % ointment Apply topically 3 times daily Apply topically 3 times daily to affected area      amitriptyline (ELAVIL) 25 MG tablet Take 25 mg by mouth nightly      ALPRAZolam (XANAX) 1 MG tablet Take 2 mg by mouth nightly as needed for Sleep.  sertraline (ZOLOFT) 50 MG tablet Take 4 tablets by mouth daily      haloperidol (HALDOL) 5 MG tablet Take 5 mg by mouth daily      folic acid (FOLVITE) 1 MG tablet Take 1 mg by mouth daily      Iron Polysacch [id number] (NIFEREX-150 FORTE PO) Take 1 tablet by mouth daily       Cyanocobalamin (VITAMIN B-12) 1000 MCG extended release tablet Take 1,000 mcg by mouth daily      Cholecalciferol (VITAMIN D3) 05731 units CAPS Take 1 capsule by mouth once a week Indications: weekly on Sun       Cholecalciferol (VITAMIN D) 2000 units CAPS capsule Take 2,000 Units by mouth daily        No current facility-administered medications for this visit. Results for orders placed during the hospital encounter of 06/28/20   XR CHEST STANDARD (2 VW)    Narrative XR CHEST (2 VW) : 6/29/2020    CLINICAL HISTORY:  SOB .    COMPARISON: 6/28/2020. TECHNIQUE: Upright AP and lateral radiographs of the chest were obtained. FINDINGS:     A shallow inspiration is present without significant infiltrate identified. The heart remains mildly enlarged with postoperative changes from previous CABG.     There is no significant pleural effusion, vascular congestion, pneumothorax, or displaced fractures identified. Impression NO EVIDENCE OF ACTIVE CARDIOPULMONARY DISEASE.     ]  Results for orders placed during the hospital encounter of 06/28/20   XR CHEST PORTABLE    Narrative EXAMINATION: XR CHEST PORTABLE    CLINICAL HISTORY: CHEST PAIN    COMPARISONS: CHEST RADIOGRAPH, ELENITA 15, 2020    FINDINGS: Rotated to left. Median sternotomy. Cardiac blood enlarged, unchanged. Pulmonary vasculature normal. Lungs clear. Impression NO ACUTE CARDIOPULMONARY DISEASE. STABLE CARDIOMEGALY.   ]  Results for orders placed during the hospital encounter of 09/27/19   XR CHEST (SINGLE VIEW FRONTAL)    Narrative EXAMINATION: XR CHEST (SINGLE VIEW FRONTAL)    CLINICAL HISTORY: Z91.89 At risk for ineffective breathing ICD10    COMPARISONS: None available. FINDINGS: Single AP portable view the chest obtained on September 27, 2019 at 1715 hours. There is mild cardiomegaly without vascular congestion pulmonary edema or pleural effusion. The mediastinum is not widened or shifted. The calcified aorta is not   dilated. Sternotomy sutures are present. The chest wall is unremarkable. CONCLUSION: NO ACUTE PROCESS   ]  No results found for this or any previous visit.]  No results found for this or any previous visit.]    Assessment/Plan:     1. VINCENT (obstructive sleep apnea)  She had sleep study on 10/19 shew severe VINCENT  And need CPAP with 10 cm . Will request CPAP with 10 cm   C/o Snoring and not sleeping well . She feels tired during day time     2. Mild intermittent asthma without complication  C/o shortness of breath  with exertion. On and off Wheezing . C/o dry cough   Using bronchodilator with albuterol nebulizer prn, she was on montelukast also and need refill . Will send refill     3. Nocturnal hypoxia  She is on 5 lit with sleep . Sleep better with 5 lit       Return in about 3 months (around 11/20/2020) for vincent, asthma, hypoxia on O2.       Zehra Nye MD

## 2020-08-31 ENCOUNTER — OFFICE VISIT (OUTPATIENT)
Dept: UROLOGY | Age: 65
End: 2020-08-31
Payer: COMMERCIAL

## 2020-08-31 VITALS
BODY MASS INDEX: 41.15 KG/M2 | WEIGHT: 241 LBS | DIASTOLIC BLOOD PRESSURE: 62 MMHG | SYSTOLIC BLOOD PRESSURE: 100 MMHG | HEART RATE: 59 BPM | HEIGHT: 64 IN

## 2020-08-31 LAB
BILIRUBIN, POC: NORMAL
BLOOD URINE, POC: NORMAL
CLARITY, POC: CLEAR
COLOR, POC: YELLOW
ESTIMATED AVERAGE GLUCOSE: 189 MG/DL
GLUCOSE URINE, POC: NORMAL
HBA1C MFR BLD: 8.2 %
KETONES, POC: NORMAL
LEUKOCYTE EST, POC: NORMAL
NITRITE, POC: NORMAL
PH, POC: 5.5
POST VOID RESIDUAL (PVR): 0 ML
PROTEIN, POC: NORMAL
SPECIFIC GRAVITY, POC: 1.02
UROBILINOGEN, POC: 1

## 2020-08-31 PROCEDURE — 1036F TOBACCO NON-USER: CPT | Performed by: UROLOGY

## 2020-08-31 PROCEDURE — G8417 CALC BMI ABV UP PARAM F/U: HCPCS | Performed by: UROLOGY

## 2020-08-31 PROCEDURE — 99203 OFFICE O/P NEW LOW 30 MIN: CPT | Performed by: UROLOGY

## 2020-08-31 PROCEDURE — 51798 US URINE CAPACITY MEASURE: CPT | Performed by: UROLOGY

## 2020-08-31 PROCEDURE — 3017F COLORECTAL CA SCREEN DOC REV: CPT | Performed by: UROLOGY

## 2020-08-31 PROCEDURE — 81003 URINALYSIS AUTO W/O SCOPE: CPT | Performed by: UROLOGY

## 2020-08-31 PROCEDURE — G8428 CUR MEDS NOT DOCUMENT: HCPCS | Performed by: UROLOGY

## 2020-08-31 RX ORDER — OXYBUTYNIN CHLORIDE 5 MG/1
5 TABLET, EXTENDED RELEASE ORAL DAILY
Qty: 30 TABLET | Refills: 6 | Status: ON HOLD | OUTPATIENT
Start: 2020-08-31 | End: 2020-09-02

## 2020-08-31 NOTE — PROGRESS NOTES
Marital status:      Spouse name: None    Number of children: None    Years of education: None    Highest education level: None   Occupational History    None   Social Needs    Financial resource strain: None    Food insecurity     Worry: None     Inability: None    Transportation needs     Medical: None     Non-medical: None   Tobacco Use    Smoking status: Never Smoker    Smokeless tobacco: Never Used   Substance and Sexual Activity    Alcohol use: Never     Frequency: Never    Drug use: Never    Sexual activity: None   Lifestyle    Physical activity     Days per week: None     Minutes per session: None    Stress: None   Relationships    Social connections     Talks on phone: None     Gets together: None     Attends Shinto service: None     Active member of club or organization: None     Attends meetings of clubs or organizations: None     Relationship status: None    Intimate partner violence     Fear of current or ex partner: None     Emotionally abused: None     Physically abused: None     Forced sexual activity: None   Other Topics Concern    None   Social History Narrative         Lives With: Spouse    Type of Home: [address] [place] [address] [address]: One level    Home Access: Elevator    Bathroom Shower/Tub: Tub/Shower unit(Simultaneous filing. User may not have seen previous data.)    Bathroom Equipment: Grab bars in shower, Shower chair    Home Equipment: Rolling walker, Cleta Prost bed    ADL Assistance: Needs assistance    Homemaking Assistance: Needs assistance    Homemaking Responsibilities: No    Ambulation Assistance: Independent    Transfer Assistance: Independent    Active : No    Additional Comments: Pt wears orthopedic shoes at baseline    The patient states she is current with [facility](RN per the ).   The patient had a walker, but obtained a hospital bed, wheel chair, BSC and O[facility] pharmacy is [address] [place] [address],[address] [address]., [place]. Transportation is provided by [employer]	[place] [facility] () per the patient     History reviewed. No pertinent family history. Current Outpatient Medications   Medication Sig Dispense Refill    oxybutynin (DITROPAN XL) 5 MG extended release tablet Take 1 tablet by mouth daily 30 tablet 6    metFORMIN (GLUCOPHAGE) 1000 MG tablet Take 1,000 mg by mouth 2 times daily (with meals)      ticagrelor (BRILINTA) 90 MG TABS tablet Take 90 mg by mouth 2 times daily      CPAP Machine MISC by Does not apply route New CPAP with 10 cm 1 each 0    albuterol (PROVENTIL) (2.5 MG/3ML) 0.083% nebulizer solution Take 3 mLs by nebulization every 6 hours as needed for Wheezing 120 each 5    montelukast (SINGULAIR) 10 MG tablet Take 1 tablet by mouth nightly 30 tablet 0    aspirin 81 MG EC tablet Take 1 tablet by mouth daily 30 tablet 3    atorvastatin (LIPITOR) 80 MG tablet Take 1 tablet by mouth nightly 30 tablet 3    clopidogrel (PLAVIX) 75 MG tablet Take 75 mg by mouth daily      traMADol (ULTRAM) 50 MG tablet Take 50 mg by mouth 3 times daily.  Take with tylenol      insulin lispro, 1 Unit Dial, (HUMALOG KWIKPEN) 100 UNIT/ML SOPN 10 units at each meals 10 pen 03    Insulin Pen Needle (NOVOFINE) 32G X 6 MM MISC qid 300 each 3    Continuous Blood Gluc  (FREESTYLE FELY 14 DAY READER) CATHLEEN As directed 1 Device 00    Continuous Blood Gluc Sensor (FREESTYLE FELY 14 DAY SENSOR) [facility] Every 2 weeks 2 each 06    OXYGEN 2 lit O2 with sleep , please give O2 concentrator 1 Units 0    clotrimazole (LOTRIMIN) 1 % cream apply to affected area twice a day IN THE MORNING AND IN THE EVENING      Emollient (EUCERIN INTENSIVE REPAIR HAND) 2.5-10 % CREA APPLY TO FEET AT BEDTIME; PLACE SOCKS OVER FEET AFTER APPLICATION IF NEEDED FOR DRY CRACKING FEET      hydrOXYzine (VISTARIL) 25 MG capsule Take 50 mg by mouth 3 times daily as needed       Nutritional Supplements (GLUCERNA SHAKE) LIQD take as directed three times a day      insulin glargine (LANTUS) 100 UNIT/ML injection vial Inject 30 Units into the skin nightly (Patient taking differently: Inject 35 Units into the skin nightly ) 1 vial 3    isosorbide dinitrate (ISORDIL) 20 MG tablet Take 1 tablet by mouth 3 times daily 90 tablet 3    nitroGLYCERIN (NITROSTAT) 0.4 MG SL tablet up to max of 3 total doses. If no relief after 1 dose, call 911. 25 tablet 3    hydrALAZINE (APRESOLINE) 50 MG tablet Take 1 tablet by mouth every 8 hours 90 tablet 3    metoprolol succinate (TOPROL XL) 50 MG extended release tablet Take 1 tablet by mouth 2 times daily 30 tablet 3    mupirocin (BACTROBAN) 2 % ointment Apply topically 3 times daily Apply topically 3 times daily to affected area      amitriptyline (ELAVIL) 25 MG tablet Take 25 mg by mouth nightly      albuterol sulfate HFA (VENTOLIN HFA) 108 (90 Base) MCG/ACT inhaler Inhale 2 puffs into the lungs as needed for Wheezing Every 4-6 hours PRN      acetaminophen (TYLENOL) 500 MG tablet Take 500 mg by mouth 3 times daily Take with Tramadol      ALPRAZolam (XANAX) 1 MG tablet Take 2 mg by mouth nightly as needed for Sleep.  gabapentin (NEURONTIN) 400 MG capsule Take 400 mg by mouth 2 times daily. AM and 800 mg in pm-9pm      sertraline (ZOLOFT) 50 MG tablet Take 4 tablets by mouth daily      haloperidol (HALDOL) 5 MG tablet Take 5 mg by mouth daily      FreeStyle Lancets MISC 1 each by Does not apply route daily 100 each 3    blood glucose test strips (FREESTYLE LITE) strip 1 each by In Vitro route daily As needed.  100 each 3    Alcohol Swabs (ALCOHOL PREP) 70 % PADS qid (Patient taking differently: Apply 1 each topically 4 times daily qid) 300 each 06    Insulin Pen Needle (NOVOFINE) 32G X 6 MM MISC Qid (Patient taking differently: 1 each 3 times daily Qid) 729 each 3    folic acid (FOLVITE) 1 MG tablet Take 1 mg by mouth daily      Iron Polysacch [id number] (NIFEREX-150 FORTE PO) Take 1 tablet by mouth daily       Cyanocobalamin (VITAMIN B-12) 1000 MCG extended release tablet Take 1,000 mcg by mouth daily      Cholecalciferol (VITAMIN D3) 33976 units CAPS Take 1 capsule by mouth once a week Indications: weekly on Sun       Cholecalciferol (VITAMIN D) 2000 units CAPS capsule Take 2,000 Units by mouth daily       insulin lispro (HUMALOG) 100 UNIT/ML injection vial Inject 0-6 Units into the skin 3 times daily (with meals) 1 vial 3    levothyroxine (SYNTHROID) 50 MCG tablet Take 50 mcg by mouth Daily      furosemide (LASIX) 40 MG tablet Take 40 mg by mouth daily Indications: M-W-F       meclizine (ANTIVERT) 25 MG tablet Take 25 mg by mouth three times daily       No current facility-administered medications for this visit. Ambien [zolpidem tartrate]; Capoten [captopril]; Clioquinol; Cogentin [benztropine]; Depakote [divalproex sodium]; Effexor xr [venlafaxine hcl er]; Geodon [ziprasidone hcl]; Lisinopril; Lyrica [pregabalin]; Navane [thiothixene]; Pamelor [nortriptyline hcl]; Remeron [mirtazapine]; Risperdal [risperidone];  Trazodone and nefazodone; and Wellbutrin [bupropion]  All reviewed and verified by Dr Louis Renteria on today's visit    No results found for: PSA, PSADIA  Results for POC orders placed in visit on 08/31/20   POCT Urinalysis No Micro (Auto)   Result Value Ref Range    Color, UA yellow     Clarity, UA clear     Glucose, UA POC neg     Bilirubin, UA neg     Ketones, UA neg     Spec Grav, UA 1.020     Blood, UA POC neg     pH, UA 5.5     Protein, UA POC 30 mg/dL     Urobilinogen, UA 1.0     Leukocytes, UA neg     Nitrite, UA neg    poct post void residual   Result Value Ref Range    post void residual 0 ml    Narrative    A point of care test   Post Void Residual was completed by performing  ultrasound scan of the bladder and  reviewed by Dr Louis Renteria       Physical Exam  Vitals:    08/31/20 0848   BP: 100/62   Pulse: 59 Weight: 241 lb (109.3 kg)   Height: 5' 4\" (1.626 m)     Constitutional: patient is oriented to person, place, and time. patient appears well-developed. Not in distress. Ears: Adequate hearing/no hearing loss  Head: Normocephalic. Atraumatic  Neck: Normal range of motion. Cardiovascular: Normal rate, BP reviewed. Normal  Pulmonary/Chest: Normal respiratory effort Normal  Abdominal: Not distended. Obese abdomen  Urologic Exam  Residual 0. Urinalysis clear. .  Musculoskeletal: Normal range of motion. Patient uses a walker. Extremities: Lower extremity edema  Neurological: No deficits noted   Skin: Skin is warm and dry. No lesions. No rashes   Psychiatric: Flat affect. Assessment/Medical Necessity-Decision Making  Detrusor instability exacerbated by obesity and diabetes  Plan  Trial of oxybutynin low-dose 5 mg p.o. daily extended release all risks and benefits of medication explained in detail via   Follow-up 3 months to see if patient is improving  Greater than 50% of 35 minutes spent consulting patient face-to-face  Orders Placed This Encounter   Procedures    POCT Urinalysis No Micro (Auto)    poct post void residual     Orders Placed This Encounter   Medications    oxybutynin (DITROPAN XL) 5 MG extended release tablet     Sig: Take 1 tablet by mouth daily     Dispense:  30 tablet     Refill:  6     Leatha Molina MD       Please note this report has been partially produced using speech recognition software  And may cause contain errors related to that system including grammar, punctuation and spelling as well as words and phrases that may seem inappropriate. If there are questions or concerns please feel free to contact me to clarify.

## 2020-09-01 ENCOUNTER — APPOINTMENT (OUTPATIENT)
Dept: CT IMAGING | Age: 65
DRG: 194 | End: 2020-09-01
Payer: COMMERCIAL

## 2020-09-01 ENCOUNTER — HOSPITAL ENCOUNTER (INPATIENT)
Age: 65
LOS: 3 days | Discharge: HOME | DRG: 194 | End: 2020-09-04
Attending: STUDENT IN AN ORGANIZED HEALTH CARE EDUCATION/TRAINING PROGRAM | Admitting: INTERNAL MEDICINE
Payer: COMMERCIAL

## 2020-09-01 PROBLEM — I50.33 HEART FAILURE, DIASTOLIC, WITH ACUTE DECOMPENSATION (HCC): Status: ACTIVE | Noted: 2020-09-01

## 2020-09-01 LAB
ALBUMIN SERPL-MCNC: 3.4 G/DL (ref 3.5–4.6)
ALP BLD-CCNC: 106 U/L (ref 40–130)
ALT SERPL-CCNC: 19 U/L (ref 0–33)
ANION GAP SERPL CALCULATED.3IONS-SCNC: 10 MEQ/L (ref 9–15)
ANISOCYTOSIS: ABNORMAL
APTT: 32.1 SEC (ref 24.4–36.8)
AST SERPL-CCNC: 25 U/L (ref 0–35)
BASOPHILS ABSOLUTE: 0.1 K/UL (ref 0–0.2)
BASOPHILS RELATIVE PERCENT: 0.8 %
BILIRUB SERPL-MCNC: 0.4 MG/DL (ref 0.2–0.7)
BUN BLDV-MCNC: 37 MG/DL (ref 8–23)
C-REACTIVE PROTEIN, HIGH SENSITIVITY: 15.3 MG/L (ref 0–5)
CALCIUM SERPL-MCNC: 8.6 MG/DL (ref 8.5–9.9)
CHLORIDE BLD-SCNC: 99 MEQ/L (ref 95–107)
CO2: 27 MEQ/L (ref 20–31)
CREAT SERPL-MCNC: 1.18 MG/DL (ref 0.5–0.9)
EOSINOPHILS ABSOLUTE: 0.2 K/UL (ref 0–0.7)
EOSINOPHILS RELATIVE PERCENT: 1.5 %
GFR AFRICAN AMERICAN: 56
GFR NON-AFRICAN AMERICAN: 46.3
GLOBULIN: 3.7 G/DL (ref 2.3–3.5)
GLUCOSE BLD-MCNC: 169 MG/DL (ref 60–115)
GLUCOSE BLD-MCNC: 256 MG/DL (ref 70–99)
HCT VFR BLD CALC: 31.2 % (ref 37–47)
HEMOGLOBIN: 10.3 G/DL (ref 12–16)
INR BLD: 1.1
LACTIC ACID: 1.5 MMOL/L (ref 0.5–2.2)
LYMPHOCYTES ABSOLUTE: 1.4 K/UL (ref 1–4.8)
LYMPHOCYTES RELATIVE PERCENT: 14 %
MAGNESIUM: 2.4 MG/DL (ref 1.7–2.4)
MCH RBC QN AUTO: 24 PG (ref 27–31.3)
MCHC RBC AUTO-ENTMCNC: 33.1 % (ref 33–37)
MCV RBC AUTO: 72.5 FL (ref 82–100)
MICROCYTES: ABNORMAL
MONOCYTES ABSOLUTE: 0.8 K/UL (ref 0.2–0.8)
MONOCYTES RELATIVE PERCENT: 7.6 %
NEUTROPHILS ABSOLUTE: 7.8 K/UL (ref 1.4–6.5)
NEUTROPHILS RELATIVE PERCENT: 76.1 %
PDW BLD-RTO: 21.4 % (ref 11.5–14.5)
PERFORMED ON: ABNORMAL
PLATELET # BLD: 196 K/UL (ref 130–400)
POC CREATININE WHOLE BLOOD: 1.3
POIKILOCYTES: ABNORMAL
POTASSIUM SERPL-SCNC: 5.9 MEQ/L (ref 3.4–4.9)
PRO-BNP: 8257 PG/ML
PROTHROMBIN TIME: 13.9 SEC (ref 12.3–14.9)
RBC # BLD: 4.3 M/UL (ref 4.2–5.4)
SLIDE REVIEW: ABNORMAL
SODIUM BLD-SCNC: 136 MEQ/L (ref 135–144)
TARGET CELLS: ABNORMAL
TOTAL CK: 112 U/L (ref 0–170)
TOTAL PROTEIN: 7.1 G/DL (ref 6.3–8)
TROPONIN: <0.01 NG/ML (ref 0–0.01)
TROPONIN: <0.01 NG/ML (ref 0–0.01)
TSH SERPL DL<=0.05 MIU/L-ACNC: 1.38 UIU/ML (ref 0.44–3.86)
WBC # BLD: 10.3 K/UL (ref 4.8–10.8)

## 2020-09-01 PROCEDURE — 85610 PROTHROMBIN TIME: CPT

## 2020-09-01 PROCEDURE — 83880 ASSAY OF NATRIURETIC PEPTIDE: CPT

## 2020-09-01 PROCEDURE — 6370000000 HC RX 637 (ALT 250 FOR IP): Performed by: INTERNAL MEDICINE

## 2020-09-01 PROCEDURE — 86141 C-REACTIVE PROTEIN HS: CPT

## 2020-09-01 PROCEDURE — 87040 BLOOD CULTURE FOR BACTERIA: CPT

## 2020-09-01 PROCEDURE — 99285 EMERGENCY DEPT VISIT HI MDM: CPT

## 2020-09-01 PROCEDURE — 85025 COMPLETE CBC W/AUTO DIFF WBC: CPT

## 2020-09-01 PROCEDURE — 6360000002 HC RX W HCPCS: Performed by: STUDENT IN AN ORGANIZED HEALTH CARE EDUCATION/TRAINING PROGRAM

## 2020-09-01 PROCEDURE — 84443 ASSAY THYROID STIM HORMONE: CPT

## 2020-09-01 PROCEDURE — 80053 COMPREHEN METABOLIC PANEL: CPT

## 2020-09-01 PROCEDURE — 6360000004 HC RX CONTRAST MEDICATION: Performed by: STUDENT IN AN ORGANIZED HEALTH CARE EDUCATION/TRAINING PROGRAM

## 2020-09-01 PROCEDURE — 96374 THER/PROPH/DIAG INJ IV PUSH: CPT

## 2020-09-01 PROCEDURE — 2580000003 HC RX 258: Performed by: INTERNAL MEDICINE

## 2020-09-01 PROCEDURE — 36415 COLL VENOUS BLD VENIPUNCTURE: CPT

## 2020-09-01 PROCEDURE — 83605 ASSAY OF LACTIC ACID: CPT

## 2020-09-01 PROCEDURE — 84484 ASSAY OF TROPONIN QUANT: CPT

## 2020-09-01 PROCEDURE — 82550 ASSAY OF CK (CPK): CPT

## 2020-09-01 PROCEDURE — 85730 THROMBOPLASTIN TIME PARTIAL: CPT

## 2020-09-01 PROCEDURE — 71275 CT ANGIOGRAPHY CHEST: CPT

## 2020-09-01 PROCEDURE — 6360000002 HC RX W HCPCS: Performed by: INTERNAL MEDICINE

## 2020-09-01 PROCEDURE — 2060000000 HC ICU INTERMEDIATE R&B

## 2020-09-01 PROCEDURE — 83735 ASSAY OF MAGNESIUM: CPT

## 2020-09-01 RX ORDER — ISOSORBIDE DINITRATE 10 MG/1
20 TABLET ORAL 3 TIMES DAILY
Status: DISCONTINUED | OUTPATIENT
Start: 2020-09-01 | End: 2020-09-04 | Stop reason: HOSPADM

## 2020-09-01 RX ORDER — NICOTINE POLACRILEX 4 MG
15 LOZENGE BUCCAL PRN
Status: DISCONTINUED | OUTPATIENT
Start: 2020-09-01 | End: 2020-09-04 | Stop reason: HOSPADM

## 2020-09-01 RX ORDER — ACETAMINOPHEN 325 MG/1
650 TABLET ORAL EVERY 6 HOURS PRN
Status: DISCONTINUED | OUTPATIENT
Start: 2020-09-01 | End: 2020-09-04 | Stop reason: HOSPADM

## 2020-09-01 RX ORDER — PROMETHAZINE HYDROCHLORIDE 12.5 MG/1
12.5 TABLET ORAL EVERY 6 HOURS PRN
Status: DISCONTINUED | OUTPATIENT
Start: 2020-09-01 | End: 2020-09-04 | Stop reason: HOSPADM

## 2020-09-01 RX ORDER — POLYETHYLENE GLYCOL 3350 17 G/17G
17 POWDER, FOR SOLUTION ORAL DAILY PRN
Status: DISCONTINUED | OUTPATIENT
Start: 2020-09-01 | End: 2020-09-04 | Stop reason: HOSPADM

## 2020-09-01 RX ORDER — LEVOTHYROXINE SODIUM 0.05 MG/1
50 TABLET ORAL DAILY
Status: DISCONTINUED | OUTPATIENT
Start: 2020-09-02 | End: 2020-09-04 | Stop reason: HOSPADM

## 2020-09-01 RX ORDER — METOPROLOL SUCCINATE 50 MG/1
50 TABLET, EXTENDED RELEASE ORAL 2 TIMES DAILY
Status: DISCONTINUED | OUTPATIENT
Start: 2020-09-01 | End: 2020-09-04 | Stop reason: HOSPADM

## 2020-09-01 RX ORDER — FOLIC ACID 1 MG/1
1 TABLET ORAL DAILY
Status: DISCONTINUED | OUTPATIENT
Start: 2020-09-01 | End: 2020-09-04 | Stop reason: HOSPADM

## 2020-09-01 RX ORDER — POTASSIUM CHLORIDE 7.45 MG/ML
10 INJECTION INTRAVENOUS PRN
Status: DISCONTINUED | OUTPATIENT
Start: 2020-09-01 | End: 2020-09-04 | Stop reason: HOSPADM

## 2020-09-01 RX ORDER — DEXTROSE MONOHYDRATE 25 G/50ML
12.5 INJECTION, SOLUTION INTRAVENOUS PRN
Status: DISCONTINUED | OUTPATIENT
Start: 2020-09-01 | End: 2020-09-04 | Stop reason: HOSPADM

## 2020-09-01 RX ORDER — DEXTROSE MONOHYDRATE 50 MG/ML
100 INJECTION, SOLUTION INTRAVENOUS PRN
Status: DISCONTINUED | OUTPATIENT
Start: 2020-09-01 | End: 2020-09-04 | Stop reason: HOSPADM

## 2020-09-01 RX ORDER — FUROSEMIDE 10 MG/ML
40 INJECTION INTRAMUSCULAR; INTRAVENOUS 2 TIMES DAILY
Status: DISCONTINUED | OUTPATIENT
Start: 2020-09-02 | End: 2020-09-02

## 2020-09-01 RX ORDER — ASPIRIN 81 MG/1
81 TABLET ORAL DAILY
Status: DISCONTINUED | OUTPATIENT
Start: 2020-09-01 | End: 2020-09-04 | Stop reason: HOSPADM

## 2020-09-01 RX ORDER — FUROSEMIDE 10 MG/ML
80 INJECTION INTRAMUSCULAR; INTRAVENOUS ONCE
Status: COMPLETED | OUTPATIENT
Start: 2020-09-01 | End: 2020-09-01

## 2020-09-01 RX ORDER — POTASSIUM CHLORIDE 20 MEQ/1
40 TABLET, EXTENDED RELEASE ORAL PRN
Status: DISCONTINUED | OUTPATIENT
Start: 2020-09-01 | End: 2020-09-04 | Stop reason: HOSPADM

## 2020-09-01 RX ORDER — HYDRALAZINE HYDROCHLORIDE 50 MG/1
50 TABLET, FILM COATED ORAL EVERY 8 HOURS SCHEDULED
Status: DISCONTINUED | OUTPATIENT
Start: 2020-09-01 | End: 2020-09-04 | Stop reason: HOSPADM

## 2020-09-01 RX ORDER — ACETAMINOPHEN 650 MG/1
650 SUPPOSITORY RECTAL EVERY 6 HOURS PRN
Status: DISCONTINUED | OUTPATIENT
Start: 2020-09-01 | End: 2020-09-04 | Stop reason: HOSPADM

## 2020-09-01 RX ORDER — SODIUM CHLORIDE 0.9 % (FLUSH) 0.9 %
10 SYRINGE (ML) INJECTION PRN
Status: DISCONTINUED | OUTPATIENT
Start: 2020-09-01 | End: 2020-09-04 | Stop reason: HOSPADM

## 2020-09-01 RX ORDER — ATORVASTATIN CALCIUM 80 MG/1
80 TABLET, FILM COATED ORAL NIGHTLY
Status: DISCONTINUED | OUTPATIENT
Start: 2020-09-01 | End: 2020-09-04 | Stop reason: HOSPADM

## 2020-09-01 RX ORDER — ONDANSETRON 2 MG/ML
4 INJECTION INTRAMUSCULAR; INTRAVENOUS EVERY 6 HOURS PRN
Status: DISCONTINUED | OUTPATIENT
Start: 2020-09-01 | End: 2020-09-04 | Stop reason: HOSPADM

## 2020-09-01 RX ORDER — SODIUM CHLORIDE 0.9 % (FLUSH) 0.9 %
10 SYRINGE (ML) INJECTION EVERY 12 HOURS SCHEDULED
Status: DISCONTINUED | OUTPATIENT
Start: 2020-09-01 | End: 2020-09-04 | Stop reason: HOSPADM

## 2020-09-01 RX ORDER — MAGNESIUM SULFATE IN WATER 40 MG/ML
2 INJECTION, SOLUTION INTRAVENOUS PRN
Status: DISCONTINUED | OUTPATIENT
Start: 2020-09-01 | End: 2020-09-04 | Stop reason: HOSPADM

## 2020-09-01 RX ORDER — FUROSEMIDE 10 MG/ML
20 INJECTION INTRAMUSCULAR; INTRAVENOUS ONCE
Status: COMPLETED | OUTPATIENT
Start: 2020-09-01 | End: 2020-09-01

## 2020-09-01 RX ADMIN — ATORVASTATIN CALCIUM 80 MG: 80 TABLET, FILM COATED ORAL at 22:04

## 2020-09-01 RX ADMIN — ASPIRIN 81 MG: 81 TABLET, COATED ORAL at 22:03

## 2020-09-01 RX ADMIN — FUROSEMIDE 20 MG: 10 INJECTION, SOLUTION INTRAVENOUS at 17:01

## 2020-09-01 RX ADMIN — FOLIC ACID 1 MG: 1 TABLET ORAL at 22:04

## 2020-09-01 RX ADMIN — ACETAMINOPHEN 650 MG: 325 TABLET, FILM COATED ORAL at 22:19

## 2020-09-01 RX ADMIN — FUROSEMIDE 80 MG: 10 INJECTION, SOLUTION INTRAMUSCULAR; INTRAVENOUS at 22:04

## 2020-09-01 RX ADMIN — ENOXAPARIN SODIUM 40 MG: 40 INJECTION SUBCUTANEOUS at 22:04

## 2020-09-01 RX ADMIN — IOPAMIDOL 100 ML: 612 INJECTION, SOLUTION INTRAVENOUS at 15:37

## 2020-09-01 RX ADMIN — Medication 10 ML: at 22:01

## 2020-09-01 RX ADMIN — HYDRALAZINE HYDROCHLORIDE 50 MG: 50 TABLET, FILM COATED ORAL at 22:04

## 2020-09-01 ASSESSMENT — ENCOUNTER SYMPTOMS
DIARRHEA: 0
TROUBLE SWALLOWING: 0
CHEST TIGHTNESS: 0
COUGH: 1
ABDOMINAL PAIN: 0
VOMITING: 0
SINUS PRESSURE: 0
SHORTNESS OF BREATH: 1
BACK PAIN: 0

## 2020-09-01 ASSESSMENT — PAIN DESCRIPTION - LOCATION
LOCATION: CHEST
LOCATION: RIB CAGE

## 2020-09-01 ASSESSMENT — PAIN SCALES - GENERAL
PAINLEVEL_OUTOF10: 9
PAINLEVEL_OUTOF10: 6

## 2020-09-01 ASSESSMENT — PAIN DESCRIPTION - PAIN TYPE: TYPE: ACUTE PAIN

## 2020-09-01 ASSESSMENT — PAIN DESCRIPTION - ORIENTATION: ORIENTATION: RIGHT;LEFT

## 2020-09-01 NOTE — H&P
Hospital Medicine  History and Physical    Patient:  Evin Perez  MRN: [id number]    CHIEF COMPLAINT:    Chief Complaint   Patient presents with    Shortness of Breath     patient with cough and back pain from coughing       History Obtained From:  Patient, EMR  Primary Care Physician: Darryl Beasley    HISTORY OF PRESENT ILLNESS:   The patient is a 60 y.o. female with PMHx of CAD, CKD, DMII, HTN, HLD, PVD who presents with SOB. History taken with video . Its been about 2 weeks and I have had a strong cough that wont' let me sleep and I have a strong pain in my ribs. She has been having increased SOB with laying flat; now she has to sleep half seated. She has also had increased swelling in her legs. She has not had fevers or chills; no diarrhea; or burning micturition. Past Medical History:      Diagnosis Date    Asthma     CAD (coronary artery disease)     CKD (chronic kidney disease) stage 3, GFR 30-59 ml/min ([facility]     Colitis     Diabetes mellitus ([facility]     Hyperlipidemia     Hypertension     PAD (peripheral artery disease) ([facility])     Prolonged emergence from general anesthesia     PVD (peripheral vascular disease) ([facility] [facility]     Thyroid disease        Past Surgical History:      Procedure Laterality Date    CARDIAC SURGERY       SECTION      COLONOSCOPY N/A 2019    COLONOSCOPY DIAGNOSTIC performed by Pebbles West MD at [address] [place] [address] GRAFT  2019    unknown vessels    HYSTERECTOMY      TOE AMPUTATION Right     3rd toe       Medications Prior to Admission:    Prior to Admission medications    Medication Sig Start Date End Date Taking?  Authorizing Provider   oxybutynin (DITROPAN XL) 5 MG extended release tablet Take 1 tablet by mouth daily 20   Donal Jeans, MD   metFORMIN (GLUCOPHAGE) 1000 MG tablet Take 1,000 mg by mouth 2 times daily (with meals)    Historical Provider, MD   ticagrelor (BRILINTA) 90 MG TABS tablet Take 90 mg by mouth 2 times daily    Historical Provider, MD   CPAP Machine MISC by Does not apply route New CPAP with 10 cm 8/20/20   Papa Bullock MD   albuterol (PROVENTIL) (2.5 MG/3ML) 0.083% nebulizer solution Take 3 mLs by nebulization every 6 hours as needed for Wheezing 8/20/20 9/19/20  Papa Bullock MD   montelukast (SINGULAIR) 10 MG tablet Take 1 tablet by mouth nightly 8/20/20   Papa Bullock MD   aspirin 81 MG EC tablet Take 1 tablet by mouth daily 6/30/20   Emilio Chamberlain MD   atorvastatin (LIPITOR) 80 MG tablet Take 1 tablet by mouth nightly 6/29/20   Emilio Chamberlain MD   clopidogrel (PLAVIX) 75 MG tablet Take 75 mg by mouth daily    Historical Provider, MD   traMADol (ULTRAM) 50 MG tablet Take 50 mg by mouth 3 times daily.  Take with tylenol    Historical Provider, MD   insulin lispro, 1 Unit Dial, (HUMALOG KWIKPEN) 100 UNIT/ML SOPN 10 units at each meals 6/18/20   Kelvin Pierson MD   Insulin Pen Needle (NOVOFINE) 32G X 6 MM MISC qid 6/18/20   Kelvin Pierson MD   Continuous Blood Gluc  (FREESTYLE FELY 14 DAY READER) CATHLEEN As directed 6/18/20   Kelvin Pierson MD   Continuous Blood Gluc Sensor (FREESTYLE FELY 14 DAY SENSOR) MISC Every 2 weeks 6/18/20   Kelvin Pierson MD   OXYGEN 2 lit O2 with sleep , please give O2 concentrator 6/12/20   Papa Bullock MD   clotrimazole (LOTRIMIN) 1 % cream apply to affected area twice a day IN THE MORNING AND IN THE EVENING 3/26/20   Historical Provider, MD   Emollient (EUCERIN INTENSIVE REPAIR HAND) 2.5-10 % CREA APPLY TO FEET AT BEDTIME; PLACE SOCKS OVER FEET AFTER APPLICATION IF NEEDED FOR DRY CRACKING FEET 3/15/20   Historical Provider, MD   hydrOXYzine (VISTARIL) 25 MG capsule Take 50 mg by mouth 3 times daily as needed  3/4/20   Historical Provider, MD   Nutritional Supplements ([address]) LIQD take as directed three times a day 6/1/20   Historical Provider, MD   insulin glargine (LANTUS) 100 UNIT/ML injection vial Inject 30 Units into the skin nightly  Patient taking differently: Inject 35 Units into the skin nightly  5/12/20   Gualberto Perez MD   isosorbide dinitrate (ISORDIL) 20 MG tablet Take 1 tablet by mouth 3 times daily 4/28/20   Kristina Beraden MD   nitroGLYCERIN (NITROSTAT) 0.4 MG SL tablet up to max of 3 total doses. If no relief after 1 dose, call 911. 4/28/20   Kristina Bearden MD   hydrALAZINE (APRESOLINE) 50 MG tablet Take 1 tablet by mouth every 8 hours 4/28/20   Kristina Bearden MD   metoprolol succinate (TOPROL XL) 50 MG extended release tablet Take 1 tablet by mouth 2 times daily 4/28/20   Kristina Bearden MD   mupirocin (BACTROBAN) 2 % ointment Apply topically 3 times daily Apply topically 3 times daily to affected area    Historical Provider, MD   amitriptyline (ELAVIL) 25 MG tablet Take 25 mg by mouth nightly    Historical Provider, MD   albuterol sulfate HFA (VENTOLIN HFA) 108 (90 Base) MCG/ACT inhaler Inhale 2 puffs into the lungs as needed for Wheezing Every 4-6 hours PRN    Historical Provider, MD   acetaminophen (TYLENOL) 500 MG tablet Take 500 mg by mouth 3 times daily Take with Tramadol    Historical Provider, MD   ALPRAZolam Alric Shiver) 1 MG tablet Take 2 mg by mouth nightly as needed for Sleep. Historical Provider, MD   gabapentin (NEURONTIN) 400 MG capsule Take 400 mg by mouth 2 times daily. AM and 800 mg in pm-9pm    Historical Provider, MD   sertraline (ZOLOFT) 50 MG tablet Take 4 tablets by mouth daily 2/12/20   Angelita Diego DO   haloperidol (HALDOL) 5 MG tablet Take 5 mg by mouth daily    Historical Provider, MD   FreeStyle Lancets MISC 1 each by Does not apply route daily 1/30/20   Julianne Goodman MD   blood glucose test strips (FREESTYLE LITE) strip 1 each by In Vitro route daily As needed.  1/30/20   Julianne Goodman MD   Alcohol Swabs (ALCOHOL PREP) 70 % PADS qid  Patient taking differently: Apply 1 each topically 4 times daily qid 1/30/20   Julianne Goodman MD   Insulin Pen Needle (NOVOFINE) 32G X 6 MM MISC Qid  Patient taking differently: 1 each 3 times daily Qid 1/30/20   Yue Webb MD   folic acid (FOLVITE) 1 MG tablet Take 1 mg by mouth daily    Historical Provider, MD   Iron Polysacch Podww-H72-FD (NIFEREX-150 FORTE PO) Take 1 tablet by mouth daily     Historical Provider, MD   Cyanocobalamin (VITAMIN B-12) 1000 MCG extended release tablet Take 1,000 mcg by mouth daily    Historical Provider, MD   Cholecalciferol (VITAMIN D3) 71257 units CAPS Take 1 capsule by mouth once a week Indications: weekly on Sun     Historical Provider, MD   Cholecalciferol (VITAMIN D) 2000 units CAPS capsule Take 2,000 Units by mouth daily     Historical Provider, MD   insulin lispro (HUMALOG) 100 UNIT/ML injection vial Inject 0-6 Units into the skin 3 times daily (with meals) 2/28/19   ESDRAS Garcia - CNS   levothyroxine (SYNTHROID) 50 MCG tablet Take 50 mcg by mouth Daily    Historical Provider, MD   furosemide (LASIX) 40 MG tablet Take 40 mg by mouth daily Indications: M-W-F     Historical Provider, MD   meclizine (ANTIVERT) 25 MG tablet Take 25 mg by mouth three times daily    Historical Provider, MD       Allergies:  Ambien [zolpidem tartrate]; Capoten [captopril]; Clioquinol; Cogentin [benztropine]; Depakote [divalproex sodium]; Effexor xr [venlafaxine hcl er]; Geodon [ziprasidone hcl]; Lisinopril; Lyrica [pregabalin]; Navane [thiothixene]; Pamelor [nortriptyline hcl]; Remeron [mirtazapine]; Risperdal [risperidone]; Trazodone and nefazodone; and Wellbutrin [bupropion]    Social History:   TOBACCO:   reports that she has never smoked. She has never used smokeless tobacco.  ETOH:   reports no history of alcohol use. Family History:   History reviewed. No pertinent family history.     REVIEW OF SYSTEMS:  Ten systems reviewed and negative except for stated in HPI    Physical Exam:    Vitals: BP (!) 125/103   Pulse 73   Temp 97.9 °F (36.6 °C) (Oral)   Resp 18   Ht 5' 4\" (1.626 m)   Wt 240 lb (108.9 kg) SpO2 98%   BMI 41.20 kg/m²   General appearance: alert, appears stated age and cooperative  Skin:  No rashes or lesions on exposed skin  HEENT: Head: Normal, normocephalic, atraumatic. Singer Maciel Neck: Elevated JVD  Lungs: Basilar crackles  Heart: regular rate and rhythm  Abdomen: soft, non-tender; bowel sounds normal; no masses,  no organomegaly  Extremities: +2 pitting edema  Neurologic: Non focal    Recent Labs     09/01/20  1430   WBC 10.3   HGB 10.3*        Recent Labs     09/01/20  1430      K 5.9*   CL 99   CO2 27   BUN 37*   CREATININE 1.18*   GLUCOSE 256*   AST 25   ALT 19   BILITOT 0.4   ALKPHOS 106     Troponin T:   Recent Labs     09/01/20  1430   TROPONINI <0.010       ABGs: No results found for: PHART, PO2ART, IZZ4HPE  INR:   Recent Labs     09/01/20  1430   INR 1.1     URINALYSIS:  Recent Labs     08/31/20  0901   NITRITE neg   COLORU yellow   PHUR 5.5   CLARITYU clear   SPECGRAV 1.020   LEUKOCYTESUR neg   BILIRUBINUR neg   BLOODU neg   GLUCOSEU neg     -----------------------------------------------------------------   Cta Chest W Wo Contrast    Result Date: 9/1/2020  The EXAMINATION: CT scan of the chest with contrast (pulmonary embolism protocol) INDICATION: Chest pain and shortness of breath. COMPARISON: None TECHNIQUE: Helical CT was performed through the chest utilizing 100 cc of Isovue-370 intravenous contrast.  Images were obtained with bolus tracking in order to opacify the pulmonary arteries. There is no comparison available. Both MIP and 3D volume rendered reconstructions were performed. FINDINGS: There are no findings of central, and/or proximal pulmonary emboli. . There is patchy multifocal areas of groundglass infiltrates throughout the lung parenchyma. There is a trace right pleural effusion. No pneumothoraces. No significant periaortic, pretracheal, parahilar adenopathy. There is subcarinal adenopathy.  In the field-of-view there is a nodule in the left lobe of thyroid measuring 1. 4 cm. There is a mild thoracic kyphosis with multilevel degenerative changes. 1. NO EVIDENCE OF CENTRAL OR SEGMENTAL PULMONARY EMBOLISM. 2. THERE ARE BILATERAL PATCHY MULTIFOCAL AREAS OF GROUNDGLASS OPACITIES THROUGHOUT THE LUNG PARENCHYMA. THERE IS SIMILAR PATTERN AND DISTRIBUTION AS COMPARED TO PRIOR EXAMINATION. MAY BE REFLECTIVE OF UNDERLYING PULMONARY EDEMA. ALTHOUGH I CANNOT COMPLETELY EXCLUDE A T SUPERIMPOSED PNEUMONITIS, ATYPICAL PNEUMONIA. IMAGING FEATURES CAN BE SEEN WITH (COVID-19) PNEUMONIA, THOUGH ARE NONSPECIFIC AND CAN OCCUR WITH A VARIETY OF INFECTIOUS AND NONINFECTIOUS PROCESSES. 3. Cholelithiasis. 1.  All CT scans at this facility use dose modulation, iterative reconstruction, and/or weight based dosing when appropriate to reduce radiation dose to as low as reasonably achievable. Assessment and Plan   1. Acute on chronic combined systolic and diastolic CHF:  Cardiology; lasix; resume home meds  2. HTN/HLD/CAD: Continue home meds once med rec is complete  3. DMII:  SSI; lantus once med rec is complete  4. Hypothyroidism:  Med rec once completed  5. Functional Status: Fall precautions. Up with assistance. PT OT  6. Diet: Diabetic   7. DVT ppx: Lovenox SCDs  8. Disposition: Dependent on hospital course. Will discharge once medically stable. SW on board for discharge planning.      Patient Active Problem List   Diagnosis Code    Severe major depression with psychotic features ([address] F32.3    Type 2 diabetes mellitus with hyperglycemia, with long-term current use of insulin ([address] E11.65, Z79.4    HTN (hypertension) I10    HLD (hyperlipidemia) E78.5    Hypothyroid E03.9    HF (heart failure) ([address] I50.9    Non-pressure ulcer of toe ([address] L97.509    Atherosclerotic PVD with intermittent claudication ([address] I70.219    Acute osteomyelitis ([address] M86.10    Skin infection L08.9    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 A49.8    Surgical wound dehiscence, initial encounter T81.31XA    S/P amputation of lesser toe, right ([facility]) Z89.421    Rectal bleeding K62.5    Athscl native arteries of left leg w ulceration oth prt foot ([facility]) I70.245    JESICA (obstructive sleep apnea) G47.33    Neuropathy due to secondary diabetes ([facility]) E13.40    Chest pain R07.9    PAD (peripheral artery disease) ([facility]) I73.9    Cellulitis L03.90    Syncope and collapse R55    Severe mitral regurgitation I34.0    Ischemic cardiomyopathy I25.5    Pulmonary hypertension ([facility]) I27.20    Acute on chronic combined systolic and diastolic congestive heart failure ([facility]) I50.43    Nocturnal hypoxia G47.34    RADHA (acute kidney injury) ([facility]) N17.9    Dizziness R42    Acute CVA (cerebrovascular accident) ([facility] [address] I63.9    Gait abnormality R26.9    Anxiety F41.9    Gastritis, unspecified, without bleeding K29.70    Pure hypercholesterolemia E78.00    Schizophrenia, unspecified ([facility]) F20.9    CAD in native artery I25.10    Extrapyramidal syndrome G25.9    Gangrene of toe of left foot ([facility]) I96    Hypotension due to drugs I95.2    Osteomyelitis of right foot ([facility]) M86.9    Nausea and vomiting R11.2    Mild intermittent asthma without complication K04.85    Heart failure, diastolic, with acute decompensation ([facility]) I50.33     Norah Brown MD  Admitting Hospitalist    Emergency Contact:

## 2020-09-01 NOTE — ED PROVIDER NOTES
[address]  eMERGENCY dEPARTMENT eNCOUnter      Pt Name: Evin Perez  MRN: [id number]  [place] 1955  Date of evaluation: 9/1/2020  Provider: Nadia Garnica DO    CHIEF COMPLAINT       Chief Complaint   Patient presents with    Shortness of Breath     patient with cough and back pain from coughing         HISTORY OF PRESENT ILLNESS   (Location/Symptom, Timing/Onset,Context/Setting, Quality, Duration, Modifying Factors, Severity)  Note limiting factors. Evin Perez is a 60 y.o. female who presents to the emergency department with complaint of shortness of breath and frequent cough. Patient states when she breathes then the pain goes into her bilateral lower ribs. Patient denies any fever or any productive sputum. Patient denies any nasal congestion. Patient has rales on exam.  Patient had denied history of congestive heart failure however reviewing the patient's records there is a diagnosis of congestive heart failure. Patient complains that her legs are little bit swollen. Patient the prescribes the pain when breathing is sharp and stabbing in quality. Tums have been present for 2 weeks. The history is provided by the patient and the spouse. The history is limited by a language barrier. A  was used. NursingNotes were reviewed. REVIEW OF SYSTEMS    (2-9 systems for level 4, 10 or more for level 5)     Review of Systems   Constitutional: Negative for activity change, appetite change, chills, fever and unexpected weight change. HENT: Negative for drooling, ear pain, nosebleeds, sinus pressure and trouble swallowing. Respiratory: Positive for cough and shortness of breath. Negative for chest tightness. Cardiovascular: Positive for leg swelling. Negative for chest pain. Gastrointestinal: Negative for abdominal pain, diarrhea and vomiting. Endocrine: Negative for polydipsia and polyphagia.    Genitourinary: Negative for dysuria, flank pain and frequency. Musculoskeletal: Negative for back pain and myalgias. Skin: Negative for pallor and rash. Neurological: Negative for syncope, weakness and headaches. Hematological: Does not bruise/bleed easily. All other systems reviewed and are negative. Except as noted above the remainder of the review of systems was reviewed and negative.        PAST MEDICAL HISTORY     Past Medical History:   Diagnosis Date    Asthma     CAD (coronary artery disease)     CKD (chronic kidney disease) stage 3, GFR 30-59 ml/min ([facility]     Colitis     Diabetes mellitus ([facility]     Hyperlipidemia     Hypertension     PAD (peripheral artery disease) ([facility])     Prolonged emergence from general anesthesia     PVD (peripheral vascular disease) ([facility]     Thyroid disease          SURGICALHISTORY       Past Surgical History:   Procedure Laterality Date    CARDIAC SURGERY       SECTION      COLONOSCOPY N/A 2019    COLONOSCOPY DIAGNOSTIC performed by Tarik Townsend MD at [address] [place] [address] GRAFT  2019    unknown vessels    HYSTERECTOMY      TOE AMPUTATION Right     3rd toe         CURRENT MEDICATIONS       Current Discharge Medication List      CONTINUE these medications which have NOT CHANGED    Details   oxybutynin (DITROPAN XL) 5 MG extended release tablet Take 1 tablet by mouth daily  Qty: 30 tablet, Refills: 6      metFORMIN (GLUCOPHAGE) 1000 MG tablet Take 1,000 mg by mouth 2 times daily (with meals)      ticagrelor (BRILINTA) 90 MG TABS tablet Take 90 mg by mouth 2 times daily      CPAP Machine MISC by Does not apply route New CPAP with 10 cm  Qty: 1 each, Refills: 0    Associated Diagnoses: JESICA (obstructive sleep apnea)      albuterol (PROVENTIL) (2.5 MG/3ML) 0.083% nebulizer solution Take 3 mLs by nebulization every 6 hours as needed for Wheezing  Qty: 120 each, Refills: 5    Associated Diagnoses: Mild intermittent asthma without complication montelukast (SINGULAIR) 10 MG tablet Take 1 tablet by mouth nightly  Qty: 30 tablet, Refills: 0    Associated Diagnoses: Mild intermittent asthma without complication      aspirin 81 MG EC tablet Take 1 tablet by mouth daily  Qty: 30 tablet, Refills: 3      atorvastatin (LIPITOR) 80 MG tablet Take 1 tablet by mouth nightly  Qty: 30 tablet, Refills: 3      clopidogrel (PLAVIX) 75 MG tablet Take 75 mg by mouth daily      traMADol (ULTRAM) 50 MG tablet Take 50 mg by mouth 3 times daily. Take with tylenol      insulin lispro, 1 Unit Dial, (HUMALOG KWIKPEN) 100 UNIT/ML SOPN 10 units at each meals  Qty: 10 pen, Refills: 03      !! Insulin Pen Needle (NOVOFINE) 32G X 6 MM MISC qid  Qty: 300 each, Refills: 3      Continuous Blood Gluc  (FREESTYLE FELY 14 DAY READER) CATHLEEN As directed  Qty: 1 Device, Refills: 00      Continuous Blood Gluc Sensor (FREESTYLE FELY 14 DAY SENSOR) MISC Every 2 weeks  Qty: 2 each, Refills: 06      OXYGEN 2 lit O2 with sleep , please give O2 concentrator  Qty: 1 Units, Refills: 0      clotrimazole (LOTRIMIN) 1 % cream apply to affected area twice a day IN THE MORNING AND IN THE EVENING      Emollient (EUCERIN INTENSIVE REPAIR HAND) 2.5-10 % CREA APPLY TO FEET AT BEDTIME; PLACE SOCKS OVER FEET AFTER APPLICATION IF NEEDED FOR DRY CRACKING FEET      hydrOXYzine (VISTARIL) 25 MG capsule Take 50 mg by mouth 3 times daily as needed       Nutritional Supplements (GLUCERNA SHAKE) LIQD take as directed three times a day      insulin glargine (LANTUS) 100 UNIT/ML injection vial Inject 30 Units into the skin nightly  Qty: 1 vial, Refills: 3      isosorbide dinitrate (ISORDIL) 20 MG tablet Take 1 tablet by mouth 3 times daily  Qty: 90 tablet, Refills: 3      nitroGLYCERIN (NITROSTAT) 0.4 MG SL tablet up to max of 3 total doses. If no relief after 1 dose, call 911.   Qty: 25 tablet, Refills: 3      hydrALAZINE (APRESOLINE) 50 MG tablet Take 1 tablet by mouth every 8 hours  Qty: 90 tablet, Refills: 3 metoprolol succinate (TOPROL XL) 50 MG extended release tablet Take 1 tablet by mouth 2 times daily  Qty: 30 tablet, Refills: 3      mupirocin (BACTROBAN) 2 % ointment Apply topically 3 times daily Apply topically 3 times daily to affected area      amitriptyline (ELAVIL) 25 MG tablet Take 25 mg by mouth nightly      albuterol sulfate HFA (VENTOLIN HFA) 108 (90 Base) MCG/ACT inhaler Inhale 2 puffs into the lungs as needed for Wheezing Every 4-6 hours PRN      acetaminophen (TYLENOL) 500 MG tablet Take 500 mg by mouth 3 times daily Take with Tramadol      ALPRAZolam (XANAX) 1 MG tablet Take 2 mg by mouth nightly as needed for Sleep.      gabapentin (NEURONTIN) 400 MG capsule Take 400 mg by mouth 2 times daily. AM and 800 mg in pm-9pm      sertraline (ZOLOFT) 50 MG tablet Take 4 tablets by mouth daily      haloperidol (HALDOL) 5 MG tablet Take 5 mg by mouth daily      FreeStyle Lancets MISC 1 each by Does not apply route daily  Qty: 100 each, Refills: 3      blood glucose test strips (FREESTYLE LITE) strip 1 each by In Vitro route daily As needed. Qty: 100 each, Refills: 3      Alcohol Swabs (ALCOHOL PREP) 70 % PADS qid  Qty: 300 each, Refills: 06      !! Insulin Pen Needle (NOVOFINE) 32G X 6 MM MISC Qid  Qty: 300 each, Refills: 3      folic acid (FOLVITE) 1 MG tablet Take 1 mg by mouth daily      Iron Polysacch Reufn-W99-QN (NIFEREX-150 FORTE PO) Take 1 tablet by mouth daily       Cyanocobalamin (VITAMIN B-12) 1000 MCG extended release tablet Take 1,000 mcg by mouth daily      !! Cholecalciferol (VITAMIN D3) 79836 units CAPS Take 1 capsule by mouth once a week Indications: weekly on Sun       !!  Cholecalciferol (VITAMIN D) 2000 units CAPS capsule Take 2,000 Units by mouth daily       insulin lispro (HUMALOG) 100 UNIT/ML injection vial Inject 0-6 Units into the skin 3 times daily (with meals)  Qty: 1 vial, Refills: 3      levothyroxine (SYNTHROID) 50 MCG tablet Take 50 mcg by mouth Daily      furosemide (LASIX) 40 MG tablet Take 40 mg by mouth daily Indications: M-W-F       meclizine (ANTIVERT) 25 MG tablet Take 25 mg by mouth three times daily       ! ! - Potential duplicate medications found. Please discuss with provider. ALLERGIES     Ambien [zolpidem tartrate]; Capoten [captopril]; Clioquinol; Cogentin [benztropine]; Depakote [divalproex sodium]; Effexor xr [venlafaxine hcl er]; Geodon [ziprasidone hcl]; Lisinopril; Lyrica [pregabalin]; Navane [thiothixene]; Pamelor [nortriptyline hcl]; Remeron [mirtazapine]; Risperdal [risperidone]; Trazodone and nefazodone; and Wellbutrin [bupropion]    FAMILY HISTORY     History reviewed. No pertinent family history.        SOCIAL HISTORY       Social History     Socioeconomic History    Marital status:      Spouse name: None    Number of children: None    Years of education: None    Highest education level: None   Occupational History    None   Social Needs    Financial resource strain: None    Food insecurity     Worry: None     Inability: None    Transportation needs     Medical: None     Non-medical: None   Tobacco Use    Smoking status: Never Smoker    Smokeless tobacco: Never Used   Substance and Sexual Activity    Alcohol use: Never     Frequency: Never    Drug use: Never    Sexual activity: None   Lifestyle    Physical activity     Days per week: None     Minutes per session: None    Stress: None   Relationships    Social connections     Talks on phone: None     Gets together: None     Attends Rastafarian service: None     Active member of club or organization: None     Attends meetings of clubs or organizations: None     Relationship status: None    Intimate partner violence     Fear of current or ex partner: None     Emotionally abused: None     Physically abused: None     Forced sexual activity: None   Other Topics Concern    None   Social History Narrative         Lives With: Spouse    Type of Home: [address] in erythema. Eyes:      General: No scleral icterus. Right eye: No foreign body or discharge. Left eye: No discharge. Extraocular Movements: Extraocular movements intact. Conjunctiva/sclera: Conjunctivae normal.      Left eye: No exudate. Pupils: Pupils are equal, round, and reactive to light. Neck:      Musculoskeletal: Normal range of motion and neck supple. No neck rigidity. Vascular: No JVD. Trachea: No tracheal deviation. Comments: No meningismus. Cardiovascular:      Rate and Rhythm: Normal rate and regular rhythm. No extrasystoles are present. Chest Wall: PMI is not displaced. No thrill. Pulses:           Radial pulses are 2+ on the right side and 2+ on the left side. Heart sounds: Normal heart sounds, S1 normal and S2 normal. Heart sounds not distant. No murmur. No systolic murmur. No diastolic murmur. No friction rub. No gallop. No S3 or S4 sounds. Pulmonary:      Effort: Pulmonary effort is normal. Tachypnea present. No respiratory distress. Breath sounds: No stridor. Examination of the right-lower field reveals rhonchi and rales. Examination of the left-lower field reveals rhonchi and rales. Rhonchi and rales present. No wheezing. Chest:      Chest wall: No mass, deformity, tenderness or crepitus. Abdominal:      General: Abdomen is flat. Bowel sounds are normal. There is no distension. Palpations: Abdomen is soft. There is no mass. Tenderness: There is no abdominal tenderness. There is no right CVA tenderness, left CVA tenderness, guarding or rebound. Hernia: No hernia is present. Musculoskeletal: Normal range of motion. General: No swelling, tenderness, deformity or signs of injury. Right lower le+ Pitting Edema present. Left lower le+ Pitting Edema present. Lymphadenopathy:      Head:      Right side of head: No submental adenopathy. Left side of head: No submental adenopathy. Skin:     General: Skin is warm and dry. Capillary Refill: Capillary refill takes less than 2 seconds. Coloration: Skin is not jaundiced or pale. Findings: No bruising, erythema, lesion or rash. Neurological:      General: No focal deficit present. Mental Status: She is alert and oriented to person, place, and time. Mental status is at baseline. Cranial Nerves: No cranial nerve deficit. Sensory: No sensory deficit. Motor: No weakness. Coordination: Coordination normal.      Deep Tendon Reflexes: Reflexes are normal and symmetric. Psychiatric:         Mood and Affect: Mood normal.         Behavior: Behavior normal. Behavior is cooperative. Thought Content: Thought content normal.         Judgment: Judgment normal.         DIAGNOSTIC RESULTS     EKG: All EKG's are interpreted by the Emergency Department Physician who either signs or Co-signsthis chart in the absence of a cardiologist.        RADIOLOGY:   Non-plain filmimages such as CT, Ultrasound and MRI are read by the radiologist. Plain radiographic images are visualized and preliminarily interpreted by the emergency physician with the below findings:        Interpretation per the Radiologist below, if available at the time ofthis note:    CTA [address]   Final Result   1. NO EVIDENCE OF CENTRAL OR SEGMENTAL PULMONARY EMBOLISM. 2. THERE ARE BILATERAL PATCHY MULTIFOCAL AREAS OF GROUNDGLASS OPACITIES THROUGHOUT THE LUNG PARENCHYMA. THERE IS SIMILAR PATTERN AND DISTRIBUTION AS COMPARED TO PRIOR EXAMINATION. MAY BE REFLECTIVE OF UNDERLYING PULMONARY EDEMA. ALTHOUGH I CANNOT    COMPLETELY EXCLUDE A T SUPERIMPOSED PNEUMONITIS, ATYPICAL PNEUMONIA. IMAGING FEATURES CAN BE SEEN WITH (COVID-19) PNEUMONIA, THOUGH ARE NONSPECIFIC AND CAN OCCUR WITH A VARIETY OF INFECTIOUS AND NONINFECTIOUS PROCESSES. 3. Cholelithiasis.       1.        All CT scans at this facility use dose modulation, iterative reconstruction, and/or weight based dosing when appropriate to reduce radiation dose to as low as reasonably achievable. ED attending reviewed the CAT scan is consistent with pulmonary edema. ED BEDSIDE ULTRASOUND:   Performed by ED Physician - none    LABS:  Labs Reviewed   CBC WITH AUTO DIFFERENTIAL - Abnormal; Notable for the following components:       Result Value    Hemoglobin 10.3 (*)     Hematocrit 31.2 (*)     MCV 72.5 (*)     MCH 24.0 (*)     RDW 21.4 (*)     Neutrophils Absolute 7.8 (*)     All other components within normal limits   COMPREHENSIVE METABOLIC PANEL - Abnormal; Notable for the following components:    Potassium 5.9 (*)     Glucose 256 (*)     BUN 37 (*)     CREATININE 1.18 (*)     GFR Non- 46.3 (*)     GFR  56.0 (*)     Alb 3.4 (*)     Globulin 3.7 (*)     All other components within normal limits   HIGH SENSITIVITY CRP - Abnormal; Notable for the following components:    CRP High Sensitivity 15.3 (*)     All other components within normal limits   POCT CREATININE - URINE - Normal   CULTURE, BLOOD 1   CULTURE, BLOOD 2   APTT   BRAIN NATRIURETIC PEPTIDE   CK   LACTIC ACID, PLASMA   MAGNESIUM   PROTIME-INR   TROPONIN   TSH WITHOUT REFLEX   BASIC METABOLIC PANEL   MAGNESIUM   LIPID PANEL   TROPONIN   TROPONIN   CBC WITH AUTO DIFFERENTIAL   POCT GLUCOSE   POCT GLUCOSE       All other labs were within normal range or not returned as of this dictation. EMERGENCY DEPARTMENT COURSE and DIFFERENTIAL DIAGNOSIS/MDM:   Vitals:    Vitals:    09/01/20 1518 09/01/20 1700 09/01/20 1751 09/01/20 1836   BP: 123/66 (!) 115/51 103/67 (!) 125/103   Pulse: 72 60 82 73   Resp: 20 18 18 18   Temp:    97.9 °F (36.6 °C)   TempSrc:    Oral   SpO2: 95% 98% 96% 98%   Weight:       Height:             MDM  Was ordered IV Lasix for pulmonary edema. As review show the patient has history of CHF. Cardiac markers within normal limits. Conditions for hospitalization.   The ED attending spoke with the hospitalist excepted the patient. CONSULTS:  IP CONSULT TO HOSPITALIST  IP CONSULT TO HEART FAILURE NURSE/COORDINATOR  IP CONSULT TO DIETITIAN  IP CONSULT TO CARDIOLOGY    PROCEDURES:  Unless otherwise noted below, none     Procedures    FINAL IMPRESSION      1. Acute pulmonary edema (HCC)    2.  Type 2 diabetes mellitus with hyperglycemia, unspecified whether long term insulin use [facility])          DISPOSITION/PLAN   DISPOSITION Admitted 09/01/2020 05:08:11 PM      PATIENT REFERRED TO:  Agustin Molina  [address] [place] [address],[phone]  [phone]            DISCHARGE MEDICATIONS:  Current Discharge Medication List             (Please note that portions of this note were completed with a voice recognition program.  Efforts were made to edit the dictations but occasionally words are mis-transcribed.)    Lamin Parra DO (electronically signed)  Attending Emergency Physician          Lamin Parra DO  09/01/20 1947

## 2020-09-02 LAB
ALBUMIN SERPL-MCNC: 3.4 G/DL (ref 3.5–4.6)
ALP BLD-CCNC: 97 U/L (ref 40–130)
ALT SERPL-CCNC: 15 U/L (ref 0–33)
ANION GAP SERPL CALCULATED.3IONS-SCNC: 10 MEQ/L (ref 9–15)
AST SERPL-CCNC: 15 U/L (ref 0–35)
BASOPHILS ABSOLUTE: 0.1 K/UL (ref 0–0.2)
BASOPHILS RELATIVE PERCENT: 0.9 %
BILIRUB SERPL-MCNC: 0.6 MG/DL (ref 0.2–0.7)
BILIRUBIN DIRECT: <0.2 MG/DL (ref 0–0.4)
BILIRUBIN, INDIRECT: ABNORMAL MG/DL (ref 0–0.6)
BUN BLDV-MCNC: 29 MG/DL (ref 8–23)
C-REACTIVE PROTEIN: 14.8 MG/L (ref 0–5)
CALCIUM SERPL-MCNC: 9.3 MG/DL (ref 8.5–9.9)
CHLORIDE BLD-SCNC: 100 MEQ/L (ref 95–107)
CHOLESTEROL, TOTAL: 105 MG/DL (ref 0–199)
CO2: 26 MEQ/L (ref 20–31)
CREAT SERPL-MCNC: 1.05 MG/DL (ref 0.5–0.9)
EOSINOPHILS ABSOLUTE: 0.2 K/UL (ref 0–0.7)
EOSINOPHILS RELATIVE PERCENT: 1.8 %
FERRITIN: 80.1 NG/ML (ref 13–150)
GFR AFRICAN AMERICAN: >60
GFR NON-AFRICAN AMERICAN: 53
GLUCOSE BLD-MCNC: 130 MG/DL (ref 70–99)
GLUCOSE BLD-MCNC: 138 MG/DL (ref 60–115)
GLUCOSE BLD-MCNC: 145 MG/DL (ref 60–115)
GLUCOSE BLD-MCNC: 218 MG/DL (ref 60–115)
GLUCOSE BLD-MCNC: 239 MG/DL (ref 60–115)
HCT VFR BLD CALC: 33 % (ref 37–47)
HDLC SERPL-MCNC: 35 MG/DL (ref 40–59)
HEMOGLOBIN: 10.9 G/DL (ref 12–16)
LACTATE DEHYDROGENASE: 225 U/L (ref 135–214)
LDL CHOLESTEROL CALCULATED: 55 MG/DL (ref 0–129)
LYMPHOCYTES ABSOLUTE: 2.1 K/UL (ref 1–4.8)
LYMPHOCYTES RELATIVE PERCENT: 22.3 %
MAGNESIUM: 2.3 MG/DL (ref 1.7–2.4)
MCH RBC QN AUTO: 24.1 PG (ref 27–31.3)
MCHC RBC AUTO-ENTMCNC: 33.1 % (ref 33–37)
MCV RBC AUTO: 72.8 FL (ref 82–100)
MONOCYTES ABSOLUTE: 1 K/UL (ref 0.2–0.8)
MONOCYTES RELATIVE PERCENT: 10.7 %
NEUTROPHILS ABSOLUTE: 6.2 K/UL (ref 1.4–6.5)
NEUTROPHILS RELATIVE PERCENT: 64.3 %
PDW BLD-RTO: 21.9 % (ref 11.5–14.5)
PERFORMED ON: ABNORMAL
PLATELET # BLD: 219 K/UL (ref 130–400)
POTASSIUM SERPL-SCNC: 3.9 MEQ/L (ref 3.4–4.9)
PRO-BNP: 6865 PG/ML
PROCALCITONIN: 0.08 NG/ML (ref 0–0.15)
RBC # BLD: 4.53 M/UL (ref 4.2–5.4)
SARS-COV-2, NAAT: NOT DETECTED
SODIUM BLD-SCNC: 136 MEQ/L (ref 135–144)
TOTAL PROTEIN: 6.9 G/DL (ref 6.3–8)
TRIGL SERPL-MCNC: 75 MG/DL (ref 0–150)
TROPONIN: <0.01 NG/ML (ref 0–0.01)
WBC # BLD: 9.6 K/UL (ref 4.8–10.8)

## 2020-09-02 PROCEDURE — 85025 COMPLETE CBC W/AUTO DIFF WBC: CPT

## 2020-09-02 PROCEDURE — 86140 C-REACTIVE PROTEIN: CPT

## 2020-09-02 PROCEDURE — 2060000000 HC ICU INTERMEDIATE R&B

## 2020-09-02 PROCEDURE — U0002 COVID-19 LAB TEST NON-CDC: HCPCS

## 2020-09-02 PROCEDURE — 84145 PROCALCITONIN (PCT): CPT

## 2020-09-02 PROCEDURE — 36415 COLL VENOUS BLD VENIPUNCTURE: CPT

## 2020-09-02 PROCEDURE — 80076 HEPATIC FUNCTION PANEL: CPT

## 2020-09-02 PROCEDURE — 80048 BASIC METABOLIC PNL TOTAL CA: CPT

## 2020-09-02 PROCEDURE — 83735 ASSAY OF MAGNESIUM: CPT

## 2020-09-02 PROCEDURE — 6360000002 HC RX W HCPCS: Performed by: INTERNAL MEDICINE

## 2020-09-02 PROCEDURE — 6370000000 HC RX 637 (ALT 250 FOR IP): Performed by: INTERNAL MEDICINE

## 2020-09-02 PROCEDURE — 82728 ASSAY OF FERRITIN: CPT

## 2020-09-02 PROCEDURE — 83615 LACTATE (LD) (LDH) ENZYME: CPT

## 2020-09-02 PROCEDURE — 84484 ASSAY OF TROPONIN QUANT: CPT

## 2020-09-02 PROCEDURE — 2580000003 HC RX 258: Performed by: INTERNAL MEDICINE

## 2020-09-02 PROCEDURE — 83880 ASSAY OF NATRIURETIC PEPTIDE: CPT

## 2020-09-02 PROCEDURE — 80061 LIPID PANEL: CPT

## 2020-09-02 RX ORDER — INSULIN GLARGINE 100 [IU]/ML
30 INJECTION, SOLUTION SUBCUTANEOUS NIGHTLY
Status: DISCONTINUED | OUTPATIENT
Start: 2020-09-02 | End: 2020-09-04 | Stop reason: HOSPADM

## 2020-09-02 RX ORDER — AMITRIPTYLINE HYDROCHLORIDE 25 MG/1
25 TABLET, FILM COATED ORAL NIGHTLY
Status: DISCONTINUED | OUTPATIENT
Start: 2020-09-02 | End: 2020-09-04 | Stop reason: HOSPADM

## 2020-09-02 RX ORDER — FUROSEMIDE 10 MG/ML
80 INJECTION INTRAMUSCULAR; INTRAVENOUS 2 TIMES DAILY
Status: DISCONTINUED | OUTPATIENT
Start: 2020-09-02 | End: 2020-09-03

## 2020-09-02 RX ORDER — SERTRALINE HYDROCHLORIDE 100 MG/1
200 TABLET, FILM COATED ORAL DAILY
Status: DISCONTINUED | OUTPATIENT
Start: 2020-09-02 | End: 2020-09-04 | Stop reason: HOSPADM

## 2020-09-02 RX ORDER — HALOPERIDOL 5 MG
5 TABLET ORAL DAILY
Status: DISCONTINUED | OUTPATIENT
Start: 2020-09-02 | End: 2020-09-04 | Stop reason: HOSPADM

## 2020-09-02 RX ORDER — HYDROXYZINE PAMOATE 25 MG/1
50 CAPSULE ORAL 3 TIMES DAILY PRN
Status: DISCONTINUED | OUTPATIENT
Start: 2020-09-02 | End: 2020-09-04 | Stop reason: HOSPADM

## 2020-09-02 RX ORDER — GABAPENTIN 400 MG/1
400 CAPSULE ORAL 2 TIMES DAILY
Status: DISCONTINUED | OUTPATIENT
Start: 2020-09-02 | End: 2020-09-04 | Stop reason: HOSPADM

## 2020-09-02 RX ADMIN — HALOPERIDOL 5 MG: 5 TABLET ORAL at 18:49

## 2020-09-02 RX ADMIN — SERTRALINE 200 MG: 100 TABLET, FILM COATED ORAL at 18:49

## 2020-09-02 RX ADMIN — ISOSORBIDE DINITRATE 20 MG: 10 TABLET ORAL at 21:59

## 2020-09-02 RX ADMIN — METOPROLOL SUCCINATE 50 MG: 50 TABLET, EXTENDED RELEASE ORAL at 21:59

## 2020-09-02 RX ADMIN — FUROSEMIDE 80 MG: 10 INJECTION, SOLUTION INTRAMUSCULAR; INTRAVENOUS at 18:49

## 2020-09-02 RX ADMIN — Medication 10 ML: at 22:02

## 2020-09-02 RX ADMIN — ATORVASTATIN CALCIUM 80 MG: 80 TABLET, FILM COATED ORAL at 21:59

## 2020-09-02 RX ADMIN — HYDRALAZINE HYDROCHLORIDE 50 MG: 50 TABLET, FILM COATED ORAL at 21:59

## 2020-09-02 RX ADMIN — ISOSORBIDE DINITRATE 20 MG: 10 TABLET ORAL at 09:50

## 2020-09-02 RX ADMIN — ISOSORBIDE DINITRATE 20 MG: 10 TABLET ORAL at 14:50

## 2020-09-02 RX ADMIN — INSULIN GLARGINE 30 UNITS: 100 INJECTION, SOLUTION SUBCUTANEOUS at 22:23

## 2020-09-02 RX ADMIN — TICAGRELOR 90 MG: 90 TABLET ORAL at 22:00

## 2020-09-02 RX ADMIN — Medication 10 ML: at 09:50

## 2020-09-02 RX ADMIN — GABAPENTIN 400 MG: 400 CAPSULE ORAL at 21:59

## 2020-09-02 RX ADMIN — ASPIRIN 81 MG: 81 TABLET, COATED ORAL at 09:50

## 2020-09-02 RX ADMIN — INSULIN LISPRO 2 UNITS: 100 INJECTION, SOLUTION INTRAVENOUS; SUBCUTANEOUS at 17:00

## 2020-09-02 RX ADMIN — HYDRALAZINE HYDROCHLORIDE 50 MG: 50 TABLET, FILM COATED ORAL at 05:22

## 2020-09-02 RX ADMIN — AMITRIPTYLINE HYDROCHLORIDE 25 MG: 25 TABLET, FILM COATED ORAL at 22:00

## 2020-09-02 RX ADMIN — FUROSEMIDE 40 MG: 10 INJECTION, SOLUTION INTRAMUSCULAR; INTRAVENOUS at 09:50

## 2020-09-02 RX ADMIN — HYDRALAZINE HYDROCHLORIDE 50 MG: 50 TABLET, FILM COATED ORAL at 12:53

## 2020-09-02 RX ADMIN — METOPROLOL SUCCINATE 50 MG: 50 TABLET, EXTENDED RELEASE ORAL at 09:50

## 2020-09-02 RX ADMIN — ENOXAPARIN SODIUM 40 MG: 40 INJECTION SUBCUTANEOUS at 09:50

## 2020-09-02 RX ADMIN — FOLIC ACID 1 MG: 1 TABLET ORAL at 09:50

## 2020-09-02 RX ADMIN — LEVOTHYROXINE SODIUM 50 MCG: 50 TABLET ORAL at 05:22

## 2020-09-02 ASSESSMENT — PAIN SCALES - GENERAL: PAINLEVEL_OUTOF10: 0

## 2020-09-02 NOTE — PROGRESS NOTES
Cardiology consult called to [facility] via office Electronically signed by Brittaney Caballero on 9/2/2020 at 9:17 AM

## 2020-09-02 NOTE — PROGRESS NOTES
Nagging dry cough. Mostly  used. Rapid covid ordered by Tova Hoyt. Reportedly Vaishnavi Grade approved. Specimen sent to lab.

## 2020-09-02 NOTE — CARE COORDINATION
[facility] [place] Case Management Initial Discharge Assessment    The LSW met with the patient to discuss the discharge plan. The LSW used the  Ipad. The  was S[facility] Castle #[id number]. PCP: Michael Wynne                                Date of Last Visit:     If no PCP, list provided? Discharge Planning    Living Arrangements: From home with spouse    Who do you live with? Spouse    Who helps you with your care:  self    If lives at home:     Do you have any barriers navigating in your home? no    Patient can perform ADL? Yes    Current Services (outpatient and in home) :  None currently - had Eileen Raymundo (RN) in June - patient thinks it might have been with [facility]    Dialysis: N/A    Is transportation available to get to your appointments? Yes    DME Equipment:  Has a bed similar to a hospital bed, wheel chair, walker, cane and shower chair    Respiratory equipment: Has O2 at night (Suppose to obtain a machine for her sleep apnea, but \"has not gotten it yet\". )    Respiratory provider:  Unsure     Pharmacy:   [address] [place] [address] with Medication Assistance Program?  No      Patient agreeable to Eileen Raymundo? The patient states she would consider Eileen 78 at discharge. Patient agreeable to SNF/Rehab? N/A    Other discharge needs identified? Freedom of choice list provided with basic dialogue that supports the patient's individualized plan of care/goals and shares the quality data associated with the providers. Yes     Does Patient Have a High-Risk for Readmission Diagnosis (CHF, PN, MI, COPD)? The plan for Transition of Care is related to the following treatment goals: Await medically stable. Initial Discharge Plan? (Note: please see concurrent daily documentation for any updates after initial note). The patient states the discharge plan is home with possible HHC. May consider [facility]. The Patient and/or patient representative:  The patient was provided with choice of any post-acute providers for care and equipment and agrees with discharge plan  Freedom of choice for HHC was discussed. DCCOP was completed. The patient is a high risk (red) for readmission.      Electronically signed by Celso Ruiz on 9/2/2020 at 3:56 PM

## 2020-09-02 NOTE — CONSULTS
Cardiology Consult Note      Date:   9/2/2020  Patient name:  Lizzy Vaughan  Date of admission:  9/1/2020  1:59 PM  MRN:   [id number]  YOB: 1955  Time of Consult:  10:08 AM    Consulting Cardiologist: Dr. Hernan Escoto. Maty Gutierrez APRN-CNP  Primary Cardiologist:  Dr. Zita Bullock    Referring Provider: Dr. Nahid Muniz MD    HPI:   Lizzy Vaughan is a 60 y.o.  female patient who is being at the request of Dr. William Gutiérrez  for inpatient consultation of CHF. She was admitted on 9/1/2020. Previous [address] and [address] [place] records have been reviewed in detail. Lizzy Vaughan is a 60 y. o.female with a PMH of CAD in native artery, Post CABG LIMA LAD 6/19 [facility] DT, post LCX PCI 4/20., Carotid atherosclerosis, US Biolateral 50-69% Dz 6/19. HYN, Hyperlipidemia,  DM, hypothyroidism. Ischemic cardiomyopathy; 50% with ANT Moderate HK. ,. Mitral regurgitation 3., PVD (peripheral vascular disease; post right bypass 6/19. Schizo-affect psychosis. That presented to the ER on  09/01/2020 with C/O shortness of breath and frequent cough along with pain that radiates to her lower ribs when she coughs. Labs in ER as follows; Na 136, K+ 3.9, BUN 29, Creat 1.05, GFR 53, Trop (-) x 3. CRP 15.3,  WBC's 9.6, Hgb 10.9, Hct 33.0  CT chest negative for PE, bilateral patchy multifocal areas of ground glass opacities through out the lung that may be reflective of underlying pulmonary edema, cannot exclude a T superimposed pneumonitis, atypical pneumonia. Cholelithiasis. Blood cultures pending. [facility] medication list from 06/17/2020  Admelog SoloStar 100 UNIT/ML Subcutaneous Solution Pen-injector; USE AS   DIRECTED; Therapy: (Recorded:01Jun2020) to Recorded   2. Albuterol Sulfate  (90 Base) MCG/ACT Inhalation Aerosol Solution; 2 puffs every   4-6 hours as needed; Therapy: [id number] to Recorded   3.  Aspirin 81 MG Oral Tablet Delayed Release; TAKE 1 TABLET DAILY  Requested for: [id number]; Last Rx:30Jan2020 Ordered   4. Atorvastatin Calcium 40 MG Oral Tablet; take 2 tablets once daily  Requested for:   02Jun2020; Last Rx:01Jun2020 Ordered   5. Basaglar KwikPen 100 UNIT/ML Subcutaneous Solution Pen-injector; INJECT   SUBCUTANEOUSLY AS DIRECTED; Therapy: [id number] to Recorded   6. Brilinta 90 MG Oral Tablet; TAKE 1 TABLET TWICE DAILY; Therapy: 28Apr2020 to ([employer])  Requested for: [id number]; Last   Rx:02Jun2020 Ordered   7. Folic Acid 1 MG Oral Tablet; TAKE 1 TABLET DAILY; Therapy: (Suad Gutiérrez) to Recorded   8. Furosemide 40 MG Oral Tablet; Take one tab daily; Therapy: [id number] to ([employer])  Requested for: 02Jun2020; Last   Rx:01Jun2020 Ordered   9. Gabapentin 600 MG Oral Tablet; Take 1 tablet in the AM, take 1 tablet in the afternoon,   and take 2 tablets in the PM;   Therapy: [id number] to Recorded   10. GARLIC TAB; take 1 daily; Therapy: (Recorded:01Jun2020) to Recorded   11. hydrALAZINE HCl - 50 MG Oral Tablet; TAKE 1 TABLET 3 TIMES DAILY; Therapy: 28Apr2020 to ([employer])  Requested for: [id number]; Last    Rx:01Jun2020 Ordered   12. Iron 325 (65 Fe) MG Oral Tablet; TAKE 1 TABLET DAILY; Therapy: (Recorded:01Jun2020) to Recorded   13. Isosorbide Dinitrate 20 MG Oral Tablet; TAKE 1 TABLET EVERY 8 HOURS DAILY; Therapy: 28Apr2020 to (Evaluate:[id number])  Requested for: [id number]; Last    Rx:01Jun2020 Ordered   14. Levothyroxine Sodium 50 MCG Oral Tablet; 1 TABLET DAILY; Therapy: [id number] to Recorded   15. Meclizine HCl - 25 MG Oral Tablet; TAKE 1 TABLET 3 TIMES DAILY AS NEEDED; Therapy: (Recorded:01Jun2020) to Recorded   16. Metoprolol Succinate ER 50 MG Oral Tablet Extended Release 24 Hour; TAKE 1 TABLET    TWICE A DAY  Requested for: [id number]; Last Rx:01Jun2020 Ordered   17. Montelukast Sodium 10 MG Oral Tablet; TAKE 1 TABLET IN THE EVENING; Therapy: (Recorded:01Jun2020) to Recorded   18.  Mupirocin 2 % External Ointment; Therapy: [id number] to Recorded   19. Nitroglycerin 0.4 MG Sublingual Tablet Sublingual; 1 tab sublingual as needed for chest    pain, may repeat every 5 minutes x 3; Therapy: 2020 to (Eudelia Grade)  Requested for: [id number]; Last    Rx:2020 Ordered   20. Ventolin  (90 Base) MCG/ACT Inhalation Aerosol Solution; INHALE 1 PUFF    EVERY 4 HOURS AS NEEDED; Therapy: (Recorded:2019) to Recorded   21. Vitamin B-12 1000 MCG Oral Tablet; TAKE 1 TABLET DAILY AS DIRECTED; Therapy: (Recorded:2020) to Recorded   22. Vitamin C TABS; TAKE 1 TABLET DAILY; Therapy: (Recorded:2020) to Recorded   23. Vitamin D3 50 MCG (2000) Oral Tablet; take 1 tablet daily; Therapy: (Recorded:2020) to Recorded    Allergies:   Allergies   Allergen Reactions    Ambien [Zolpidem Tartrate]     Capoten [Captopril]     Clioquinol     Cogentin [Benztropine]     Depakote [Divalproex Sodium]     Effexor Xr [Venlafaxine Hcl Er]     Geodon [Ziprasidone Hcl]     Lisinopril      Hyperkalemia: 20 potassium was 6.7    Lyrica [Pregabalin]     Navane [Thiothixene]     Pamelor [Nortriptyline Hcl]     Remeron [Mirtazapine]     Risperdal [Risperidone]     Trazodone And Nefazodone     Wellbutrin [Bupropion]        Past Medical History:  Past Medical History:   Diagnosis Date    Asthma     CAD (coronary artery disease)     CKD (chronic kidney disease) stage 3, GFR 30-59 ml/min ([facility])     Colitis     Diabetes mellitus [facility]     Hyperlipidemia     Hypertension     PAD (peripheral artery disease) ([facility])     Prolonged emergence from general anesthesia     PVD (peripheral vascular disease) ([facility]     Thyroid disease        Past Surgical History:  Past Surgical History:   Procedure Laterality Date    CARDIAC SURGERY       SECTION      COLONOSCOPY N/A 2019    COLONOSCOPY DIAGNOSTIC performed by Kelly Sam MD at [address] [place] [address] GRAFT  06/2019    unknown vessels    HYSTERECTOMY      TOE AMPUTATION Right     3rd toe       Family History:   History reviewed. No pertinent family history. Social  History:     Social History     Tobacco Use    Smoking status: Never Smoker    Smokeless tobacco: Never Used   Substance Use Topics    Alcohol use: Never     Frequency: Never       Home Medications:    Prior to Admission medications    Medication Sig Start Date End Date Taking?  Authorizing Provider   ticagrelor (BRILINTA) 90 MG TABS tablet Take 90 mg by mouth 2 times daily   Yes Historical Provider, MD   CPAP Machine MISC by Does not apply route New CPAP with 10 cm 8/20/20  Yes Maximiliano Carson MD   albuterol (PROVENTIL) (2.5 MG/3ML) 0.083% nebulizer solution Take 3 mLs by nebulization every 6 hours as needed for Wheezing 8/20/20 9/19/20 Yes Maximiliano Carson MD   montelukast (SINGULAIR) 10 MG tablet Take 1 tablet by mouth nightly 8/20/20  Yes Maximiliano Carson MD   aspirin 81 MG EC tablet Take 1 tablet by mouth daily 6/30/20  Yes Sil Adams MD   atorvastatin (LIPITOR) 80 MG tablet Take 1 tablet by mouth nightly 6/29/20  Yes Sil Adams MD   insulin lispro, 1 Unit Dial, (HUMALOG KWIKPEN) 100 UNIT/ML SOPN 10 units at each meals 6/18/20  Yes Abel Vaughn MD   Insulin Pen Needle (NOVOFINE) 32G X 6 MM MISC qid 6/18/20  Yes Dewayne Porter MD   Continuous Blood Gluc  (FREESTYLE FELY 14 DAY READER) CATHLEEN As directed 6/18/20  Yes Dewayne Porter MD   Continuous Blood Gluc Sensor (FREESTYLE FELY 14 DAY SENSOR) MISC Every 2 weeks 6/18/20  Yes Dewayne Porter MD   OXYGEN 2 lit O2 with sleep , please give O2 concentrator 6/12/20  Yes Maximiliano Carson MD   hydrOXYzine (VISTARIL) 25 MG capsule Take 50 mg by mouth 3 times daily as needed  3/4/20  Yes Historical Provider, MD   Nutritional Supplements ([address]) LIQD take as directed three times a day 6/1/20  Yes Historical Provider, MD   insulin glargine (LANTUS) 100 UNIT/ML injection vial Inject 30 Units into the skin nightly  Patient taking differently: Inject 35 Units into the skin nightly  5/12/20  Yes Frank Gamez MD   isosorbide dinitrate (ISORDIL) 20 MG tablet Take 1 tablet by mouth 3 times daily 4/28/20  Yes Emilio Chamberlain MD   hydrALAZINE (APRESOLINE) 50 MG tablet Take 1 tablet by mouth every 8 hours 4/28/20  Yes Emilio Chamberlain MD   metoprolol succinate (TOPROL XL) 50 MG extended release tablet Take 1 tablet by mouth 2 times daily 4/28/20  Yes Emilio Chamberlain MD   amitriptyline (ELAVIL) 25 MG tablet Take 25 mg by mouth nightly   Yes Historical Provider, MD   albuterol sulfate HFA (VENTOLIN HFA) 108 (90 Base) MCG/ACT inhaler Inhale 2 puffs into the lungs as needed for Wheezing Every 4-6 hours PRN   Yes Historical Provider, MD   acetaminophen (TYLENOL) 500 MG tablet Take 500 mg by mouth 3 times daily Take with Tramadol   Yes Historical Provider, MD   gabapentin (NEURONTIN) 400 MG capsule Take 400 mg by mouth 2 times daily. AM and 800 mg in pm-9pm   Yes Historical Provider, MD   sertraline (ZOLOFT) 50 MG tablet Take 4 tablets by mouth daily 2/12/20  Yes [place] Boland,    haloperidol (HALDOL) 5 MG tablet Take 5 mg by mouth daily   Yes Historical Provider, MD   FreeStyle Lancets MISC 1 each by Does not apply route daily 1/30/20  Yes Kelvin Pierson MD   blood glucose test strips (FREESTYLE LITE) strip 1 each by In Vitro route daily As needed.  1/30/20  Yes Kelvin Pierson MD   Alcohol Swabs (ALCOHOL PREP) 70 % PADS qid  Patient taking differently: Apply 1 each topically 4 times daily qid 1/30/20  Yes Kelvin Pierson MD   Insulin Pen Needle (NOVOFINE) 32G X 6 MM MISC Qid  Patient taking differently: 1 each 3 times daily Qid 1/30/20  Yes Kelvin Pierson MD   folic acid (FOLVITE) 1 MG tablet Take 1 mg by mouth daily   Yes Historical Provider, MD   Iron Polysacch Adpqc-[id number] (NIFEREX-150 FORTE PO) Take 1 tablet by mouth daily    Yes Historical Provider, MD   Cyanocobalamin (VITAMIN B-12) 1000 MCG extended release tablet Take 1,000 mcg by mouth daily   Yes Historical Provider, MD   insulin lispro (HUMALOG) 100 UNIT/ML injection vial Inject 0-6 Units into the skin 3 times daily (with meals) 2/28/19  Yes Ayla Eye, APRN - CNS   levothyroxine (SYNTHROID) 50 MCG tablet Take 50 mcg by mouth Daily   Yes Historical Provider, MD   furosemide (LASIX) 40 MG tablet Take 40 mg by mouth daily Indications: M-W-F    Yes Historical Provider, MD   meclizine (ANTIVERT) 25 MG tablet Take 25 mg by mouth three times daily   Yes Historical Provider, MD   oxybutynin (DITROPAN XL) 5 MG extended release tablet Take 1 tablet by mouth daily 8/31/20   Carol Paul MD   metFORMIN (GLUCOPHAGE) 1000 MG tablet Take 1,000 mg by mouth 2 times daily (with meals)    Historical Provider, MD   clopidogrel (PLAVIX) 75 MG tablet Take 75 mg by mouth daily    Historical Provider, MD   traMADol (ULTRAM) 50 MG tablet Take 50 mg by mouth 3 times daily. Take with tylenol    Historical Provider, MD   Emollient (EUCERIN INTENSIVE REPAIR HAND) 2.5-10 % CREA APPLY TO FEET AT BEDTIME; PLACE SOCKS OVER FEET AFTER APPLICATION IF NEEDED FOR DRY CRACKING FEET 3/15/20   Historical Provider, MD   nitroGLYCERIN (NITROSTAT) 0.4 MG SL tablet up to max of 3 total doses. If no relief after 1 dose, call 911. 4/28/20   Emiliano Barker MD   mupirocin (BACTROBAN) 2 % ointment Apply topically 3 times daily Apply topically 3 times daily to affected area    Historical Provider, MD   ALPRAZolam Daryel Ham) 1 MG tablet Take 2 mg by mouth nightly as needed for Sleep.     Historical Provider, MD   Cholecalciferol (VITAMIN D3) 26675 units CAPS Take 1 capsule by mouth once a week Indications: weekly on Sun     Historical Provider, MD   Cholecalciferol (VITAMIN D) 2000 units CAPS capsule Take 2,000 Units by mouth daily     Historical Provider, MD       Current Medications:   dextrose         IV Medications:  Current Facility-Administered Medications   Medication Dose Route Frequency Provider Last Rate Last Dose    sodium chloride flush 0.9 % injection 10 mL  10 mL Intravenous 2 times per day Laquita Chambers MD   10 mL at 09/01/20 2201    sodium chloride flush 0.9 % injection 10 mL  10 mL Intravenous PRZARA Chambers MD        acetaminophen (TYLENOL) tablet 650 mg  650 mg Oral Q6H PRZARA Chambers MD   650 mg at 09/01/20 2219    Or    acetaminophen (TYLENOL) suppository 650 mg  650 mg Rectal Q6H PRZARA Chambers MD        polyethylene glycol (GLYCOLAX) packet 17 g  17 g Oral Daily PRZARA Chambers MD        promethazine (PHENERGAN) tablet 12.5 mg  12.5 mg Oral Q6H PRZARA Chambers MD        Or    ondansetron (ZOFRAN) injection 4 mg  4 mg Intravenous Q6H PRZARA Chambers MD        enoxaparin (LOVENOX) injection 40 mg  40 mg Subcutaneous Daily Laquita Chambers MD   40 mg at 09/01/20 2204    insulin lispro (HUMALOG) injection vial 0-6 Units  0-6 Units Subcutaneous TID WC Laquita Chambers MD        insulin lispro (HUMALOG) injection vial 0-3 Units  0-3 Units Subcutaneous Nightly Laquita Chambers MD   1 Units at 09/01/20 2205    glucose (GLUTOSE) 40 % oral gel 15 g  15 g Oral PRZARA Chambers MD        dextrose 50 % IV solution  12.5 g Intravenous PRZARA Chambers MD        glucagon (rDNA) injection 1 mg  1 mg Intramuscular PRZARA Chambers MD        dextrose 5 % solution  100 mL/hr Intravenous PRZARA Chambers MD        furosemide (LASIX) injection 40 mg  40 mg Intravenous BID Laquita Chambers MD        potassium chloride (KLOR-CON M) extended release tablet 40 mEq  40 mEq Oral PRZARA Chambers MD        Or    potassium bicarb-citric acid (EFFER-K) effervescent tablet 40 mEq  40 mEq Oral PRZARA Chambers MD        Or    potassium chloride 10 mEq/100 mL IVPB (Peripheral Line)  10 mEq Intravenous PRZARA Chambers MD        magnesium sulfate 2 g in 50 mL IVPB premix  2 g Intravenous PRZARA Chambers MD        aspirin EC tablet 81 mg  81 mg Oral Daily Laquita Chambers MD   81 mg at 09/01/20 2203    atorvastatin (LIPITOR) tablet 80 mg  80 mg Oral Nightly Laquita Chambers MD   80 mg at 63/19/74 5882    folic acid (FOLVITE) tablet 1 mg  1 mg Oral Daily Laquita Chambers MD   1 mg at 09/01/20 2204    isosorbide dinitrate (ISORDIL) tablet 20 mg  20 mg Oral TID Laquita Chambers MD        levothyroxine (SYNTHROID) tablet 50 mcg  50 mcg Oral Daily Laquita Chambers MD   50 mcg at 09/02/20 0522    metoprolol succinate (TOPROL XL) extended release tablet 50 mg  50 mg Oral BID Laquita Chambers MD        hydrALAZINE (APRESOLINE) tablet 50 mg  50 mg Oral 3 times per day Laquita Chambers MD   50 mg at 09/02/20 0522         Vital Signs:  Vitals:    09/01/20 2204 09/02/20 0017 09/02/20 0516 09/02/20 0649   BP: (!) 135/49 108/81  (!) 129/45   Pulse: 60   67   Resp:    17   Temp:    97.7 °F (36.5 °C)   TempSrc:    Oral   SpO2: 98%   100%   Weight:   242 lb 6.4 oz (110 kg)    Height:           Intake/Output Summary (Last 24 hours) at 9/2/2020 1008  Last data filed at 9/2/2020 0525  Gross per 24 hour   Intake --   Output 2525 ml   Net -2525 ml       Patient Vitals for the past 96 hrs (Last 3 readings):   Weight   09/02/20 0516 242 lb 6.4 oz (110 kg)   09/01/20 1353 240 lb (108.9 kg)           Diagnostics:    EKG:    Telemetry: normal sinus  occasional PVC's.     I&O 24 hrs  -2525    Lab Data:  BMP:  Recent Labs     09/01/20  1430 09/02/20  0619    136   K 5.9* 3.9   CL 99 100   CO2 27 26   BUN 37* 29*   CREATININE 1.18* 1.05*   LABGLOM 46.3* 53.0*       CBC:  Recent Labs     09/01/20  1430 09/02/20  0619   WBC 10.3 9.6   RBC 4.30 4.53   HGB 10.3* 10.9*   HCT 31.2* 33.0*   MCV 72.5* 72.8*   MCH 24.0* 24.1*   MCHC 33.1 33.1   RDW 21.4* 21.9*    219       Cardiac Enzymes:   Recent Labs     09/01/20  1430 09/01/20 2007 09/02/20  0152   CKTOTAL 112  --   --    TROPONINI <0.010 <0.010 <0.010       Hepatic Function Panel:  Recent Labs     09/01/20  1430   ALKPHOS 106   ALT 19 AST 25   PROT 7.1   BILITOT 0.4   LABALBU 3.4*       Magnesium:  Recent Labs     09/02/20  0619   MG 2.3       Pro-BNP:  Lab Results   Component Value Date    PROBNP 8,257 09/01/2020    PROBNP 5,639 06/29/2020    PROBNP 4,202 04/21/2020       INR:  Recent Labs     09/01/20  1430   PROTIME 13.9   INR 1.1       TSH:  Lab Results   Component Value Date    TSH 1.380 09/01/2020       Lipid Profile:  Lab Results   Component Value Date    TRIG 75 09/02/2020    HDL 35 09/02/2020    LDLCALC 55 09/02/2020       HgbA1C:  Lab Results   Component Value Date    LABA1C 8.2 08/31/2020       Lactate Level:  Recent Labs     09/01/20  1430   LACTA 1.5       CMP:  Recent Labs     09/01/20  1430 09/02/20  0619    136   K 5.9* 3.9   CL 99 100   CO2 27 26   BUN 37* 29*   CREATININE 1.18* 1.05*   GLUCOSE 256* 130*   CALCIUM 8.6 9.3   PROT 7.1  --    LABALBU 3.4*  --    BILITOT 0.4  --    ALKPHOS 106  --    AST 25  --    ALT 19  --        Amylase:  No results for input(s): AMYLASE in the last 72 hours. Lipase:  No results for input(s): LIPASE in the last 72 hours. ABG:  No results for input(s): PH, PO2, PCO2, HCO3, BE, O2SAT in the last 72 hours. Radiology:   Cta Chest W Wo Contrast    Result Date: 9/1/2020  The EXAMINATION: CT scan of the chest with contrast (pulmonary embolism protocol) INDICATION: Chest pain and shortness of breath. COMPARISON: None TECHNIQUE: Helical CT was performed through the chest utilizing 100 cc of Isovue-370 intravenous contrast.  Images were obtained with bolus tracking in order to opacify the pulmonary arteries. There is no comparison available. Both MIP and 3D volume rendered reconstructions were performed. FINDINGS: There are no findings of central, and/or proximal pulmonary emboli. . There is patchy multifocal areas of groundglass infiltrates throughout the lung parenchyma. There is a trace right pleural effusion. No pneumothoraces.  No significant periaortic, pretracheal, parahilar adenopathy. There is subcarinal adenopathy. In the field-of-view there is a nodule in the left lobe of thyroid measuring 1.4 cm. There is a mild thoracic kyphosis with multilevel degenerative changes. 1. NO EVIDENCE OF CENTRAL OR SEGMENTAL PULMONARY EMBOLISM. 2. THERE ARE BILATERAL PATCHY MULTIFOCAL AREAS OF GROUNDGLASS OPACITIES THROUGHOUT THE LUNG PARENCHYMA. THERE IS SIMILAR PATTERN AND DISTRIBUTION AS COMPARED TO PRIOR EXAMINATION. MAY BE REFLECTIVE OF UNDERLYING PULMONARY EDEMA. ALTHOUGH I CANNOT COMPLETELY EXCLUDE A T SUPERIMPOSED PNEUMONITIS, ATYPICAL PNEUMONIA. IMAGING FEATURES CAN BE SEEN WITH (COVID-19) PNEUMONIA, THOUGH ARE NONSPECIFIC AND CAN OCCUR WITH A VARIETY OF INFECTIOUS AND NONINFECTIOUS PROCESSES. 3. Cholelithiasis. 1.  All CT scans at this facility use dose modulation, iterative reconstruction, and/or weight based dosing when appropriate to reduce radiation dose to as low as reasonably achievable. Impression:   Active Problems:    Heart failure, diastolic, with acute decompensation ([facility])  Resolved Problems:    * No resolved hospital problems.  *    Patient Active Problem List   Diagnosis    Severe major depression with psychotic features ([address]    Type 2 diabetes mellitus with hyperglycemia, with long-term current use of insulin ([address]    HTN (hypertension)    HLD (hyperlipidemia)    Hypothyroid    HF (heart failure) (HCC)    Non-pressure ulcer of toe (HCC)    Atherosclerotic PVD with intermittent claudication (HCC)    Acute osteomyelitis ([address]    Skin infection    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    Surgical wound dehiscence, initial encounter    S/P amputation of lesser toe, right (HCC)    Rectal bleeding    Athscl native arteries of left leg w ulceration oth prt foot ([address]    JESICA (obstructive sleep apnea)    Neuropathy due to secondary diabetes ([address]    Chest pain    PAD (peripheral artery disease) (HCC)    Cellulitis    Syncope and collapse    Severe mitral regurgitation    Ischemic cardiomyopathy    Pulmonary hypertension (HCC)    Acute on chronic combined systolic and diastolic congestive heart failure (HCC)    Nocturnal hypoxia    RADHA (acute kidney injury) (HCC)    Dizziness    [facility]A [facility]cerebrovascular accident) [facility]    Gait abnormality    Anxiety    Gastritis, unspecified, without bleeding    Pure hypercholesterolemia    Schizophrenia, unspecified ([facility]    CAD in native artery    Extrapyramidal syndrome    Gangrene of toe of left foot (HCC)    Hypotension due to drugs    Osteomyelitis of right foot (HCC)    Nausea and vomiting    Mild intermittent asthma without complication    Heart failure, diastolic, with acute decompensation ([facility])       Assessment:  1. Shortness of breath. CT scan identified negative for PE, bilateral patchy multifocal areas of ground glass opacities through out the lung that may be reflective of underlying pulmonary edema, cannot exclude a T superimposed pneumonitis, atypical pneumonia. Patient has been diuresed for 2525 ml since admission. 2. Cough  3. HTN : Well controlled on current medications. 4. CAD  5. Anemia Hgb 10.9     Plan:    1. CTs can suspicious for COVID, suggest COVID testing. 2. Monitor electrolytes closely due to diuresis. 3. Further recommendations per Dr. Trell Ellington MD     Patient's note was reviewed:  Read and agree with nurse practitioners evaluation. CT scan shows no pleural effusions  Presently trying to get a  to evaluate the patient-  Discussed with primary/hospitalist.  May have some element of fluid overload, but am somewhat worried about COVID. Hence, physical exam being deferred at this time. Follow BUN and creatinine  Patient had relatively recent echocardiogram--- April 2020--may repeat once COVID status has been addressed. However, the patient does have severe mitral vegetation and some mild element of aortic stenosis even after her open heart surgery.   This is by the above-mentioned echocardiogram.  Certainly she could have some element of fluid overload. Agree with diuresis and will go with afterload reduction--we will convert to oral in the morning  Further recommendations pending    Was able to use an  finally. Main problem is a cough which is had for 2 weeks. Shortness of breath is mildly improved. No true angina, just rib pain due to coughing  Dr. Bentley Bamberger    Thank you for the opportunity to participate in the care of your patient. Do not hesitate to call if you have any questions.     Electronically signed by ESDRAS Mckeon CNP, [address] on 9/2/2020 at 10:08 AM

## 2020-09-03 LAB
ANION GAP SERPL CALCULATED.3IONS-SCNC: 11 MEQ/L (ref 9–15)
ANISOCYTOSIS: ABNORMAL
BASOPHILS ABSOLUTE: 0.1 K/UL (ref 0–0.2)
BASOPHILS RELATIVE PERCENT: 0.6 %
BUN BLDV-MCNC: 32 MG/DL (ref 8–23)
CALCIUM SERPL-MCNC: 8.8 MG/DL (ref 8.5–9.9)
CHLORIDE BLD-SCNC: 98 MEQ/L (ref 95–107)
CO2: 27 MEQ/L (ref 20–31)
CREAT SERPL-MCNC: 1.04 MG/DL (ref 0.5–0.9)
EOSINOPHILS ABSOLUTE: 0.1 K/UL (ref 0–0.7)
EOSINOPHILS RELATIVE PERCENT: 1.1 %
GFR AFRICAN AMERICAN: 50
GFR AFRICAN AMERICAN: >60
GFR NON-AFRICAN AMERICAN: 41
GFR NON-AFRICAN AMERICAN: 53.6
GLUCOSE BLD-MCNC: 109 MG/DL (ref 60–115)
GLUCOSE BLD-MCNC: 182 MG/DL (ref 60–115)
GLUCOSE BLD-MCNC: 187 MG/DL (ref 70–99)
GLUCOSE BLD-MCNC: 191 MG/DL (ref 60–115)
GLUCOSE BLD-MCNC: 236 MG/DL (ref 60–115)
HCT VFR BLD CALC: 31.2 % (ref 37–47)
HEMOGLOBIN: 10.5 G/DL (ref 12–16)
LYMPHOCYTES ABSOLUTE: 1.6 K/UL (ref 1–4.8)
LYMPHOCYTES RELATIVE PERCENT: 15.7 %
MCH RBC QN AUTO: 24.2 PG (ref 27–31.3)
MCHC RBC AUTO-ENTMCNC: 33.7 % (ref 33–37)
MCV RBC AUTO: 71.9 FL (ref 82–100)
MICROCYTES: ABNORMAL
MONOCYTES ABSOLUTE: 1 K/UL (ref 0.2–0.8)
MONOCYTES RELATIVE PERCENT: 9.2 %
NEUTROPHILS ABSOLUTE: 7.6 K/UL (ref 1.4–6.5)
NEUTROPHILS RELATIVE PERCENT: 73.4 %
PDW BLD-RTO: 21.1 % (ref 11.5–14.5)
PERFORMED ON: ABNORMAL
PERFORMED ON: NORMAL
PLATELET # BLD: 235 K/UL (ref 130–400)
POC CREATININE: 1.3 MG/DL (ref 0.6–1.2)
POC SAMPLE TYPE: ABNORMAL
POIKILOCYTES: ABNORMAL
POTASSIUM SERPL-SCNC: 4.3 MEQ/L (ref 3.4–4.9)
RBC # BLD: 4.34 M/UL (ref 4.2–5.4)
SODIUM BLD-SCNC: 136 MEQ/L (ref 135–144)
TARGET CELLS: ABNORMAL
WBC # BLD: 10.4 K/UL (ref 4.8–10.8)

## 2020-09-03 PROCEDURE — 6370000000 HC RX 637 (ALT 250 FOR IP): Performed by: INTERNAL MEDICINE

## 2020-09-03 PROCEDURE — 2060000000 HC ICU INTERMEDIATE R&B

## 2020-09-03 PROCEDURE — 2580000003 HC RX 258: Performed by: INTERNAL MEDICINE

## 2020-09-03 PROCEDURE — 85025 COMPLETE CBC W/AUTO DIFF WBC: CPT

## 2020-09-03 PROCEDURE — 94761 N-INVAS EAR/PLS OXIMETRY MLT: CPT

## 2020-09-03 PROCEDURE — 6360000002 HC RX W HCPCS: Performed by: INTERNAL MEDICINE

## 2020-09-03 PROCEDURE — 80048 BASIC METABOLIC PNL TOTAL CA: CPT

## 2020-09-03 PROCEDURE — 94660 CPAP INITIATION&MGMT: CPT

## 2020-09-03 PROCEDURE — 36415 COLL VENOUS BLD VENIPUNCTURE: CPT

## 2020-09-03 RX ORDER — FUROSEMIDE 10 MG/ML
40 INJECTION INTRAMUSCULAR; INTRAVENOUS 2 TIMES DAILY
Status: DISCONTINUED | OUTPATIENT
Start: 2020-09-03 | End: 2020-09-03

## 2020-09-03 RX ORDER — FUROSEMIDE 20 MG/1
20 TABLET ORAL 2 TIMES DAILY
Status: DISCONTINUED | OUTPATIENT
Start: 2020-09-03 | End: 2020-09-04

## 2020-09-03 RX ORDER — METOLAZONE 5 MG/1
2.5 TABLET ORAL ONCE
Status: COMPLETED | OUTPATIENT
Start: 2020-09-03 | End: 2020-09-03

## 2020-09-03 RX ADMIN — Medication 10 ML: at 21:06

## 2020-09-03 RX ADMIN — SERTRALINE 200 MG: 100 TABLET, FILM COATED ORAL at 08:48

## 2020-09-03 RX ADMIN — FUROSEMIDE 80 MG: 10 INJECTION, SOLUTION INTRAMUSCULAR; INTRAVENOUS at 08:48

## 2020-09-03 RX ADMIN — TICAGRELOR 90 MG: 90 TABLET ORAL at 21:04

## 2020-09-03 RX ADMIN — Medication 10 ML: at 08:48

## 2020-09-03 RX ADMIN — AMITRIPTYLINE HYDROCHLORIDE 25 MG: 25 TABLET, FILM COATED ORAL at 21:04

## 2020-09-03 RX ADMIN — GABAPENTIN 400 MG: 400 CAPSULE ORAL at 21:04

## 2020-09-03 RX ADMIN — FOLIC ACID 1 MG: 1 TABLET ORAL at 08:48

## 2020-09-03 RX ADMIN — LEVOTHYROXINE SODIUM 50 MCG: 50 TABLET ORAL at 05:22

## 2020-09-03 RX ADMIN — GABAPENTIN 400 MG: 400 CAPSULE ORAL at 08:47

## 2020-09-03 RX ADMIN — INSULIN LISPRO 2 UNITS: 100 INJECTION, SOLUTION INTRAVENOUS; SUBCUTANEOUS at 12:12

## 2020-09-03 RX ADMIN — FUROSEMIDE 20 MG: 20 TABLET ORAL at 21:04

## 2020-09-03 RX ADMIN — ISOSORBIDE DINITRATE 20 MG: 10 TABLET ORAL at 14:04

## 2020-09-03 RX ADMIN — METOPROLOL SUCCINATE 50 MG: 50 TABLET, EXTENDED RELEASE ORAL at 21:04

## 2020-09-03 RX ADMIN — HALOPERIDOL 5 MG: 5 TABLET ORAL at 08:47

## 2020-09-03 RX ADMIN — ASPIRIN 81 MG: 81 TABLET, COATED ORAL at 08:47

## 2020-09-03 RX ADMIN — HYDRALAZINE HYDROCHLORIDE 50 MG: 50 TABLET, FILM COATED ORAL at 21:04

## 2020-09-03 RX ADMIN — HYDROXYZINE PAMOATE 50 MG: 25 CAPSULE ORAL at 21:05

## 2020-09-03 RX ADMIN — HYDRALAZINE HYDROCHLORIDE 50 MG: 50 TABLET, FILM COATED ORAL at 14:04

## 2020-09-03 RX ADMIN — FUROSEMIDE 40 MG: 10 INJECTION, SOLUTION INTRAMUSCULAR; INTRAVENOUS at 16:25

## 2020-09-03 RX ADMIN — ISOSORBIDE DINITRATE 20 MG: 10 TABLET ORAL at 08:48

## 2020-09-03 RX ADMIN — ENOXAPARIN SODIUM 40 MG: 40 INJECTION SUBCUTANEOUS at 08:48

## 2020-09-03 RX ADMIN — METOPROLOL SUCCINATE 50 MG: 50 TABLET, EXTENDED RELEASE ORAL at 08:47

## 2020-09-03 RX ADMIN — INSULIN GLARGINE 30 UNITS: 100 INJECTION, SOLUTION SUBCUTANEOUS at 21:05

## 2020-09-03 RX ADMIN — HYDRALAZINE HYDROCHLORIDE 50 MG: 50 TABLET, FILM COATED ORAL at 05:22

## 2020-09-03 RX ADMIN — INSULIN LISPRO 1 UNITS: 100 INJECTION, SOLUTION INTRAVENOUS; SUBCUTANEOUS at 09:12

## 2020-09-03 RX ADMIN — ISOSORBIDE DINITRATE 20 MG: 10 TABLET ORAL at 21:05

## 2020-09-03 RX ADMIN — METOLAZONE 2.5 MG: 5 TABLET ORAL at 21:13

## 2020-09-03 RX ADMIN — ATORVASTATIN CALCIUM 80 MG: 80 TABLET, FILM COATED ORAL at 21:05

## 2020-09-03 RX ADMIN — TICAGRELOR 90 MG: 90 TABLET ORAL at 08:48

## 2020-09-03 ASSESSMENT — PAIN SCALES - GENERAL
PAINLEVEL_OUTOF10: 0

## 2020-09-03 NOTE — ACP (ADVANCE CARE PLANNING)
Advance Care Planning     Advance Care Planning Activator (Inpatient)  Conversation Note      Date of ACP Conversation: 9/1/2020    Conversation Conducted with: Patient with Decision Making Capacity    ACP Activator: Andrewstana HOLT Darcy Roberts makes decisions on behalf of the incapacitated patient: Decision Maker is asked to consider and make decisions based on patient values, known preferences, or best interests. Health Care Decision Maker:     Current Designated Health Care Decision Maker:   Primary Decision Maker: Odell Ware - [facility] - [phone]  (If there is a [address] named in the [facility] Makers\" box in the ACP activity, but it is not visible above, be sure to open that field and then select the health care decision maker relationship (ie \"primary\") in the blank space to the right of the name.) Validate  this information as still accurate & up-to-date; edit [employer] field as needed.)    Note: Assess and validate information in current ACP documents, as indicated. If no Decision Maker listed above or available through scanned documents, then:    If no Authorized Decision Maker has previously been identified, then patient chooses [address]:  \"Who would you like to name as your primary health care decision-maker? \"               Name:         Relationship:           Phone number:   \"Can this person be reached easily? \"   \"Who would you like to name as your back-up decision maker? \"   Name:         Relationship:           Phone number:   \"Can this person be reached easily? \"     Note: If the relationship of these Decision-Makers to the patient does NOT follow your state's Next of Kin hierarchy, recommend that patient complete ACP document that meets state-specific requirements to allow them to act on the patient's behalf when appropriate. Care Preferences    Ventilation:   \"If you were in your present state of health POLST/POST/MOLST/MOST prepared for Provider review and signature      Follow-up plan:    [] Schedule follow-up conversation to continue planning  [] Referred individual to Provider for additional questions/concerns   [] Advised patient/agent/surrogate to review completed ACP document and update if needed with changes in condition, patient preferences or care setting    [] This note routed to one or more involved healthcare providers

## 2020-09-03 NOTE — PROGRESS NOTES
[facility] OF THE [facility] Heart Progress Note      Patient: Carlos Bassett    Unit/Bed: S816/J137-66  YOB: 1955  MRN: [id number]  [facility] Date:  9/1/2020  Hospital Day: 2    Rounding Date: 9/3/2020    Rounding Cardiologist:  CESILIA Senior MD    PRIMARY Cardiologist: Eduardo Miner    Subjective Complaint:   There is still significant language barrier, however we are able to communicate reasonably well. The patient really denies any particular cardiac symptoms at this time. Even her cough seems to be improved. She denies any chest discomfort at this time, and is less short of breath. .     Physical Examination:     BP (!) 131/53   Pulse 57   Temp 97.2 °F (36.2 °C) (Oral)   Resp 18   Ht 5' 4\" (1.626 m)   Wt 247 lb 11.2 oz (112.4 kg)   SpO2 91%   BMI 42.52 kg/m²         Intake/Output Summary (Last 24 hours) at 9/3/2020 1742  Last data filed at 9/3/2020 0847  Gross per 24 hour   Intake 5 ml   Output 500 ml   Net -495 ml                  Carlos Bassett examined at bedside in in no apparent distress and cooperative. Focused exam reveals:     Cardiac: Deferred due to COVID status still being unclear     Lungs: Deferred due to COVID status    Extremities:   Deferred due to CODE STATUS    Telemetry:      normal sinus          LABS:   CBC:   Recent Labs     09/01/20  1430 09/02/20  0619 09/03/20  0618   WBC 10.3 9.6 10.4   HGB 10.3* 10.9* 10.5*    219 235      BMP:    Recent Labs     09/01/20  1430 09/01/20  1439 09/02/20  0619 09/03/20  0618     --  136 136   K 5.9*  --  3.9 4.3   CL 99  --  100 98   CO2 27  --  26 27   BUN 37*  --  29* 32*   CREATININE 1.18* 1.3* 1.05* 1.04*   GLUCOSE 256*  --  130* 187*              Troponin: No results for input(s): TROPONINT in the last 72 hours.      BNP:   Recent Labs     09/01/20  1430 09/02/20  1139   PROBNP 8,257 6,865      INR:   Recent Labs     09/01/20  1430   INR 1.1      Mg:   Recent Labs     09/02/20  0619   MG 2.3       Cardiac Testing:

## 2020-09-03 NOTE — PROGRESS NOTES
Hospitalist Progress Note      PCP: Portillo Sears    Date of Admission: 9/1/2020    Chief Complaint:    Chief Complaint   Patient presents with    Shortness of Breath     patient with cough and back pain from coughing     Subjective:  Patient feels better; denies a cough over the last day; SOB has resolved; orthopnea has resolved.   12 point ROS negative other than mentioned above     Medications:  Reviewed    Infusion Medications    dextrose       Scheduled Medications    furosemide  40 mg Intravenous BID    amitriptyline  25 mg Oral Nightly    gabapentin  400 mg Oral BID    haloperidol  5 mg Oral Daily    insulin glargine  30 Units Subcutaneous Nightly    insulin lispro  5 Units Subcutaneous TID WC    sertraline  200 mg Oral Daily    ticagrelor  90 mg Oral BID    sodium chloride flush  10 mL Intravenous 2 times per day    enoxaparin  40 mg Subcutaneous Daily    insulin lispro  0-6 Units Subcutaneous TID WC    insulin lispro  0-3 Units Subcutaneous Nightly    aspirin  81 mg Oral Daily    atorvastatin  80 mg Oral Nightly    folic acid  1 mg Oral Daily    isosorbide dinitrate  20 mg Oral TID    levothyroxine  50 mcg Oral Daily    metoprolol succinate  50 mg Oral BID    hydrALAZINE  50 mg Oral 3 times per day     PRN Meds: hydrOXYzine, sodium chloride flush, acetaminophen **OR** acetaminophen, polyethylene glycol, promethazine **OR** ondansetron, glucose, dextrose, glucagon (rDNA), dextrose, potassium chloride **OR** potassium alternative oral replacement **OR** potassium chloride, magnesium sulfate      Intake/Output Summary (Last 24 hours) at 9/3/2020 1624  Last data filed at 9/3/2020 0847  Gross per 24 hour   Intake 5 ml   Output 500 ml   Net -495 ml     Exam:    BP (!) 131/53   Pulse 57   Temp 97.2 °F (36.2 °C) (Oral)   Resp 18   Ht 5' 4\" (1.626 m)   Wt 247 lb 11.2 oz (112.4 kg)   SpO2 91%   BMI 42.52 kg/m²     General appearance: No apparent distress, appears stated age and cooperative. HEENT: Conjunctivae/corneas clear. Neck:  Trachea midline. Respiratory:  Normal respiratory effort. Clear to auscultation  Cardiovascular: Regular rate and rhythm   Abdomen: Soft, non-tender, non-distended with normal bowel sounds. Musculoskeletal: trace edema  Neuro: Non Focal.  Capillary Refill: Brisk,< 3 seconds   Peripheral Pulses: +2 palpable, equal bilaterally     Labs:   Recent Labs     09/01/20  1430 09/02/20 0619 09/03/20 0618   WBC 10.3 9.6 10.4   HGB 10.3* 10.9* 10.5*   HCT 31.2* 33.0* 31.2*    219 235     Recent Labs     09/01/20  1430 09/01/20  1439 09/02/20 0619 09/03/20  0618     --  136 136   K 5.9*  --  3.9 4.3   CL 99  --  100 98   CO2 27  --  26 27   BUN 37*  --  29* 32*   CREATININE 1.18* 1.3* 1.05* 1.04*   CALCIUM 8.6  --  9.3 8.8     Recent Labs     09/01/20  1430 09/02/20  1139   AST 25 15   ALT 19 15   BILIDIR  --  <0.2   BILITOT 0.4 0.6   ALKPHOS 106 97     Recent Labs     09/01/20  1430   INR 1.1     Recent Labs     09/01/20  1430 09/01/20 2007 09/02/20  0152   CKTOTAL 112  --   --    TROPONINI <0.010 <0.010 <0.010       Urinalysis:      Lab Results   Component Value Date    NITRU Negative 04/21/2020    BLOODU neg 08/31/2020    BLOODU Negative 04/21/2020    SPECGRAV 1.020 08/31/2020    SPECGRAV 1.015 04/21/2020    GLUCOSEU neg 08/31/2020    GLUCOSEU 250 04/21/2020     Radiology:  CTA CHEST W WO CONTRAST   Final Result   1. NO EVIDENCE OF CENTRAL OR SEGMENTAL PULMONARY EMBOLISM. 2. THERE ARE BILATERAL PATCHY MULTIFOCAL AREAS OF GROUNDGLASS OPACITIES THROUGHOUT THE LUNG PARENCHYMA. THERE IS SIMILAR PATTERN AND DISTRIBUTION AS COMPARED TO PRIOR EXAMINATION. MAY BE REFLECTIVE OF UNDERLYING PULMONARY EDEMA. ALTHOUGH I CANNOT    COMPLETELY EXCLUDE A T SUPERIMPOSED PNEUMONITIS, ATYPICAL PNEUMONIA. IMAGING FEATURES CAN BE SEEN WITH (COVID-19) PNEUMONIA, THOUGH ARE NONSPECIFIC AND CAN OCCUR WITH A VARIETY OF INFECTIOUS AND NONINFECTIOUS PROCESSES.    3. Cholelithiasis. 1.        All CT scans at this facility use dose modulation, iterative reconstruction, and/or weight based dosing when appropriate to reduce radiation dose to as low as reasonably achievable. Assessment/Plan:    1. Acute on chronic combined systolic and diastolic CHF:  increased lasix last night for another dose; patient is now close to baseline; likely d/c tomorrow  2. HTN/HLD/CAD: Continue home meds   3. DMII:  SSI; lantus   4. Hypothyroidism:  Continue home meds  5. Schizphrenia:  Continue home meds  6. Cough:  Resolved  7. Functional Status: Fall precautions. Up with assistance. PT OT  8. Diet: Diabetic   9. DVT ppx: Lovenox SCDs  10. Disposition: Dependent on hospital course. Will discharge once medically stable. SW on board for discharge planning. Active Hospital Problems    Diagnosis Date Noted    Heart failure, diastolic, with acute decompensation ([facility] [address] [I50.33] 09/01/2020      Additional work up or/and treatment plan may be added today or then after based on clinical progression. I am managing a portion of pt care. Some medical issues are handled by other specialists. Additional work up and treatment should be done in out pt setting by pt PCP and other out pt providers. In addition to examining and evaluating pt, I spent additional time explaining care, normal and abnormal findings, and treatment plan. All of pt questions were answered. Counseling, diet and education were  provided. Case will be discussed with nursing staff when appropriate. Family will be updated if and when appropriate.       Diet: DIET LOW SODIUM 2 GM; Carb Control: 3 carb choices (45 gms)/meal    Code Status: Full Code    PT/OT Eval     Electronically signed by Yolis Austin MD on 9/3/2020 at 4:24 PM

## 2020-09-03 NOTE — FLOWSHEET NOTE
Patient made comment about not having CPAP machine for her sleep apnea. Patient isn't in any respiratory distress at this time. Perfect serve sent to Dr. Albert Romero regarding respiratory consult and orders for so. Electronically signed by Shashi Bermeo RN on 9/3/2020 at 2:40 AM    [id number]: Respiratory therapist, Mark Berg, notified regarding CPAP order. Electronically signed by Shashi Bermeo RN on 9/3/2020 at 3:13 AM    05:00 Spoke to Mariel Kraus supervisor, pt moved to a rule out room until cleared with 2nd Covid test. Belongings transferred with pt.

## 2020-09-04 VITALS
HEIGHT: 64 IN | OXYGEN SATURATION: 93 % | HEART RATE: 56 BPM | DIASTOLIC BLOOD PRESSURE: 53 MMHG | TEMPERATURE: 97.6 F | BODY MASS INDEX: 42.29 KG/M2 | RESPIRATION RATE: 18 BRPM | WEIGHT: 247.7 LBS | SYSTOLIC BLOOD PRESSURE: 133 MMHG

## 2020-09-04 PROBLEM — I25.5 ISCHEMIC MYOCARDIAL DYSFUNCTION: Status: ACTIVE | Noted: 2019-06-28

## 2020-09-04 PROBLEM — J45.20 MILD INTERMITTENT ASTHMA: Status: ACTIVE | Noted: 2019-03-24

## 2020-09-04 PROBLEM — F32.9 MAJOR DEPRESSIVE DISORDER, SINGLE EPISODE, UNSPECIFIED: Status: ACTIVE | Noted: 2019-03-21

## 2020-09-04 PROBLEM — E11.40 TYPE 2 DIABETES MELLITUS WITH DIABETIC NEUROPATHY, UNSPECIFIED (HCC): Status: ACTIVE | Noted: 2019-03-24

## 2020-09-04 PROBLEM — I73.9 PERIPHERAL VASCULAR DISEASE (HCC): Status: ACTIVE | Noted: 2019-04-30

## 2020-09-04 PROBLEM — E13.9 SECONDARY DIABETES MELLITUS (HCC): Status: ACTIVE | Noted: 2019-02-25

## 2020-09-04 LAB
ANION GAP SERPL CALCULATED.3IONS-SCNC: 9 MEQ/L (ref 9–15)
BASOPHILS ABSOLUTE: 0.1 K/UL (ref 0–0.2)
BASOPHILS RELATIVE PERCENT: 0.7 %
BUN BLDV-MCNC: 33 MG/DL (ref 8–23)
CALCIUM SERPL-MCNC: 8.8 MG/DL (ref 8.5–9.9)
CHLORIDE BLD-SCNC: 97 MEQ/L (ref 95–107)
CO2: 30 MEQ/L (ref 20–31)
CREAT SERPL-MCNC: 1.19 MG/DL (ref 0.5–0.9)
EOSINOPHILS ABSOLUTE: 0.1 K/UL (ref 0–0.7)
EOSINOPHILS RELATIVE PERCENT: 0.6 %
GFR AFRICAN AMERICAN: 55.5
GFR NON-AFRICAN AMERICAN: 45.8
GLUCOSE BLD-MCNC: 117 MG/DL (ref 70–99)
GLUCOSE BLD-MCNC: 120 MG/DL (ref 60–115)
GLUCOSE BLD-MCNC: 184 MG/DL (ref 60–115)
HCT VFR BLD CALC: 31 % (ref 37–47)
HEMOGLOBIN: 10.2 G/DL (ref 12–16)
LYMPHOCYTES ABSOLUTE: 2 K/UL (ref 1–4.8)
LYMPHOCYTES RELATIVE PERCENT: 22 %
MCH RBC QN AUTO: 23.9 PG (ref 27–31.3)
MCHC RBC AUTO-ENTMCNC: 33 % (ref 33–37)
MCV RBC AUTO: 72.5 FL (ref 82–100)
MONOCYTES ABSOLUTE: 0.9 K/UL (ref 0.2–0.8)
MONOCYTES RELATIVE PERCENT: 9.5 %
NEUTROPHILS ABSOLUTE: 6.2 K/UL (ref 1.4–6.5)
NEUTROPHILS RELATIVE PERCENT: 67.2 %
PDW BLD-RTO: 21.4 % (ref 11.5–14.5)
PERFORMED ON: ABNORMAL
PERFORMED ON: ABNORMAL
PLATELET # BLD: 223 K/UL (ref 130–400)
POTASSIUM SERPL-SCNC: 4.1 MEQ/L (ref 3.4–4.9)
RBC # BLD: 4.27 M/UL (ref 4.2–5.4)
SODIUM BLD-SCNC: 136 MEQ/L (ref 135–144)
WBC # BLD: 9.2 K/UL (ref 4.8–10.8)

## 2020-09-04 PROCEDURE — 6360000002 HC RX W HCPCS: Performed by: INTERNAL MEDICINE

## 2020-09-04 PROCEDURE — 36415 COLL VENOUS BLD VENIPUNCTURE: CPT

## 2020-09-04 PROCEDURE — 6370000000 HC RX 637 (ALT 250 FOR IP): Performed by: INTERNAL MEDICINE

## 2020-09-04 PROCEDURE — 2580000003 HC RX 258: Performed by: INTERNAL MEDICINE

## 2020-09-04 PROCEDURE — 2700000000 HC OXYGEN THERAPY PER DAY

## 2020-09-04 PROCEDURE — 80048 BASIC METABOLIC PNL TOTAL CA: CPT

## 2020-09-04 PROCEDURE — 85025 COMPLETE CBC W/AUTO DIFF WBC: CPT

## 2020-09-04 RX ORDER — MAGNESIUM OXIDE 400 MG/1
400 TABLET ORAL DAILY
Qty: 30 TABLET | Refills: 1 | Status: SHIPPED | OUTPATIENT
Start: 2020-09-04 | End: 2021-03-09

## 2020-09-04 RX ORDER — FUROSEMIDE 40 MG/1
40 TABLET ORAL DAILY
Status: DISCONTINUED | OUTPATIENT
Start: 2020-09-05 | End: 2020-09-04 | Stop reason: HOSPADM

## 2020-09-04 RX ORDER — FUROSEMIDE 40 MG/1
40 TABLET ORAL DAILY
Qty: 60 TABLET | Refills: 3 | Status: SHIPPED | OUTPATIENT
Start: 2020-09-04 | End: 2022-01-01

## 2020-09-04 RX ADMIN — Medication 10 ML: at 08:51

## 2020-09-04 RX ADMIN — SERTRALINE 200 MG: 100 TABLET, FILM COATED ORAL at 08:51

## 2020-09-04 RX ADMIN — ENOXAPARIN SODIUM 40 MG: 40 INJECTION SUBCUTANEOUS at 08:51

## 2020-09-04 RX ADMIN — LEVOTHYROXINE SODIUM 50 MCG: 50 TABLET ORAL at 06:22

## 2020-09-04 RX ADMIN — FOLIC ACID 1 MG: 1 TABLET ORAL at 08:50

## 2020-09-04 RX ADMIN — ISOSORBIDE DINITRATE 20 MG: 10 TABLET ORAL at 08:51

## 2020-09-04 RX ADMIN — HYDRALAZINE HYDROCHLORIDE 50 MG: 50 TABLET, FILM COATED ORAL at 15:59

## 2020-09-04 RX ADMIN — ASPIRIN 81 MG: 81 TABLET, COATED ORAL at 08:50

## 2020-09-04 RX ADMIN — INSULIN LISPRO 1 UNITS: 100 INJECTION, SOLUTION INTRAVENOUS; SUBCUTANEOUS at 12:09

## 2020-09-04 RX ADMIN — METOPROLOL SUCCINATE 50 MG: 50 TABLET, EXTENDED RELEASE ORAL at 08:51

## 2020-09-04 RX ADMIN — TICAGRELOR 90 MG: 90 TABLET ORAL at 08:51

## 2020-09-04 RX ADMIN — GABAPENTIN 400 MG: 400 CAPSULE ORAL at 08:50

## 2020-09-04 RX ADMIN — ISOSORBIDE DINITRATE 20 MG: 10 TABLET ORAL at 15:59

## 2020-09-04 RX ADMIN — HALOPERIDOL 5 MG: 5 TABLET ORAL at 08:50

## 2020-09-04 RX ADMIN — FUROSEMIDE 20 MG: 20 TABLET ORAL at 08:50

## 2020-09-04 RX ADMIN — HYDRALAZINE HYDROCHLORIDE 50 MG: 50 TABLET, FILM COATED ORAL at 06:22

## 2020-09-04 ASSESSMENT — PAIN SCALES - GENERAL
PAINLEVEL_OUTOF10: 0
PAINLEVEL_OUTOF10: 0

## 2020-09-04 NOTE — PROGRESS NOTES
get repeat echocardiogram  4.  See orders  Electronically signed by Percy Macias MD on 9/4/2020 at 10:32 AM

## 2020-09-04 NOTE — FLOWSHEET NOTE
Reviewed discharge instructions with patient and her  using the IPad  video call. Discussed follow up appointments, medication changes and home going care. Printed information in [address] provided to patient regarding new medications. All questions answered. Taken to main entrance via wheelchair for discharge to home.  Electronically signed by Yenny Guidry RN on 9/4/2020 at 4:02 PM

## 2020-09-04 NOTE — DISCHARGE SUMMARY
Hospital Medicine Discharge Summary    Joseluis Cuello  :  1955  MRN:  [id number]    Admit date:  2020  Discharge date:  2020    Admitting Physician:  Finesse Crain MD  Primary Care Physician:  Christos Martinez      Discharge Diagnoses:    Acute on chronic combined systolic and diastolic CHF    Chief Complaint   Patient presents with    Shortness of Breath     patient with cough and back pain from coughing     Hospital Course:   Patient presented with an acute exacerbation of CHF. IMproved with diuresis; will be discharged on diuretics per cardiology recommendation. Discussed with patient to weigh herself daily; referral was made to the heart failure clinic. Exam on discharge:   BP (!) 126/47   Pulse 55   Temp 97.8 °F (36.6 °C) (Oral)   Resp 20   Ht 5' 4\" (1.626 m)   Wt 247 lb 11.2 oz (112.4 kg)   SpO2 100%   BMI 42.52 kg/m²   General appearance: No apparent distress, appears stated age and cooperative. HEENT: Conjunctivae/corneas clear. Neck:  Trachea midline. Respiratory:  Normal respiratory effort. Clear to auscultation  Cardiovascular: Regular rate and rhythm   Abdomen: Soft, non-tender, non-distended with normal bowel sounds. Musculoskeletal: trace edema  Neuro: Non Focal.  Capillary Refill: Brisk,< 3 seconds   Peripheral Pulses: +2 palpable, equal bilaterally     Patient was seen by the following consultants while admitted to [facility]:   Consults:  IP CONSULT TO HOSPITALIST  IP CONSULT TO HEART FAILURE NURSE/COORDINATOR  IP CONSULT TO DIETITIAN  IP CONSULT TO CARDIOLOGY  IP CONSULT TO CASE MANAGEMENT    Significant Diagnostic Studies:    Refer to chart     Please refer to chart if no studies are shown here    Cta Chest W Wo Contrast    Result Date: 2020  The EXAMINATION: CT scan of the chest with contrast (pulmonary embolism protocol) INDICATION: Chest pain and shortness of breath.  COMPARISON: None TECHNIQUE: Helical CT was performed through the chest utilizing 100 cc of Isovue-370 intravenous contrast.  Images were obtained with bolus tracking in order to opacify the pulmonary arteries. There is no comparison available. Both MIP and 3D volume rendered reconstructions were performed. FINDINGS: There are no findings of central, and/or proximal pulmonary emboli. . There is patchy multifocal areas of groundglass infiltrates throughout the lung parenchyma. There is a trace right pleural effusion. No pneumothoraces. No significant periaortic, pretracheal, parahilar adenopathy. There is subcarinal adenopathy. In the field-of-view there is a nodule in the left lobe of thyroid measuring 1.4 cm. There is a mild thoracic kyphosis with multilevel degenerative changes. 1. NO EVIDENCE OF CENTRAL OR SEGMENTAL PULMONARY EMBOLISM. 2. THERE ARE BILATERAL PATCHY MULTIFOCAL AREAS OF GROUNDGLASS OPACITIES THROUGHOUT THE LUNG PARENCHYMA. THERE IS SIMILAR PATTERN AND DISTRIBUTION AS COMPARED TO PRIOR EXAMINATION. MAY BE REFLECTIVE OF UNDERLYING PULMONARY EDEMA. ALTHOUGH I CANNOT COMPLETELY EXCLUDE A T SUPERIMPOSED PNEUMONITIS, ATYPICAL PNEUMONIA. IMAGING FEATURES CAN BE SEEN WITH (COVID-19) PNEUMONIA, THOUGH ARE NONSPECIFIC AND CAN OCCUR WITH A VARIETY OF INFECTIOUS AND NONINFECTIOUS PROCESSES. 3. Cholelithiasis. 1.  All CT scans at this facility use dose modulation, iterative reconstruction, and/or weight based dosing when appropriate to reduce radiation dose to as low as reasonably achievable.        Discharge Medications:       [place]   Home Medication Instructions SON:[id number]    Printed on:09/04/20 1311   Medication Information                      acetaminophen (TYLENOL) 500 MG tablet  Take 500 mg by mouth 3 times daily Take with Tramadol             albuterol (PROVENTIL) (2.5 MG/3ML) 0.083% nebulizer solution  Take 3 mLs by nebulization every 6 hours as needed for Wheezing             albuterol sulfate HFA (VENTOLIN HFA) 108 (90 Base) MCG/ACT inhaler  Inhale 2 puffs into the lungs as needed for Wheezing Every 4-6 hours PRN             Alcohol Swabs (ALCOHOL PREP) 70 % PADS  qid             amitriptyline (ELAVIL) 25 MG tablet  Take 25 mg by mouth nightly             aspirin 81 MG EC tablet  Take 1 tablet by mouth daily             atorvastatin (LIPITOR) 80 MG tablet  Take 1 tablet by mouth nightly             blood glucose test strips (FREESTYLE LITE) strip  1 each by In Vitro route daily As needed. Continuous Blood Gluc  (FREESTYLE FELY 14 DAY READER) CATHLEEN  As directed             Continuous Blood Gluc Sensor (FREESTYLE FELY 14 DAY SENSOR) MISC  Every 2 weeks             CPAP Machine MISC  by Does not apply route New CPAP with 10 cm             Cyanocobalamin (VITAMIN B-12) 1000 MCG extended release tablet  Take 1,000 mcg by mouth daily             Emollient (EUCERIN INTENSIVE REPAIR HAND) 2.5-10 % CREA  APPLY TO FEET AT BEDTIME; PLACE SOCKS OVER FEET AFTER APPLICATION IF NEEDED FOR DRY CRACKING FEET             folic acid (FOLVITE) 1 MG tablet  Take 1 mg by mouth daily             FreeStyle Lancets MISC  1 each by Does not apply route daily             furosemide (LASIX) 40 MG tablet  Take 1 tablet by mouth daily             gabapentin (NEURONTIN) 400 MG capsule  Take 400 mg by mouth 2 times daily.  AM and 800 mg in pm-9pm             haloperidol (HALDOL) 5 MG tablet  Take 5 mg by mouth daily             hydrALAZINE (APRESOLINE) 50 MG tablet  Take 1 tablet by mouth every 8 hours             hydrOXYzine (VISTARIL) 25 MG capsule  Take 50 mg by mouth 3 times daily as needed              insulin glargine (LANTUS) 100 UNIT/ML injection vial  Inject 30 Units into the skin nightly             insulin lispro (HUMALOG) 100 UNIT/ML injection vial  Inject 0-6 Units into the skin 3 times daily (with meals)             insulin lispro, 1 Unit Dial, (HUMALOG KWIKPEN) 100 UNIT/ML SOPN  10 units at each meals             Insulin Pen Needle (NOVOFINE) 32G X 6 MM MISC  Qid             Insulin Pen Needle (NOVOFINE) 32G X 6 MM MISC  qid             Iron Polysacch Jxulo-V60-ZX (NIFEREX-150 FORTE PO)  Take 1 tablet by mouth daily              isosorbide dinitrate (ISORDIL) 20 MG tablet  Take 1 tablet by mouth 3 times daily             levothyroxine (SYNTHROID) 50 MCG tablet  Take 50 mcg by mouth Daily             magnesium oxide (MAG-OX) 400 MG tablet  Take 1 tablet by mouth daily             meclizine (ANTIVERT) 25 MG tablet  Take 25 mg by mouth three times daily             metoprolol succinate (TOPROL XL) 50 MG extended release tablet  Take 1 tablet by mouth 2 times daily             montelukast (SINGULAIR) 10 MG tablet  Take 1 tablet by mouth nightly             nitroGLYCERIN (NITROSTAT) 0.4 MG SL tablet  up to max of 3 total doses. If no relief after 1 dose, call 911. Nutritional Supplements (GLUCERNA SHAKE) LIQD  take as directed three times a day             OXYGEN  2 lit O2 with sleep , please give O2 concentrator             sertraline (ZOLOFT) 50 MG tablet  Take 4 tablets by mouth daily             ticagrelor (BRILINTA) 90 MG TABS tablet  Take 90 mg by mouth 2 times daily                 Disposition:   If discharged to Home, Any [facility] needs that were indicated and/or required as been addressed and set up by Social Work. Condition at discharge: good     Activity: activity as tolerated    Total time taken for discharging this patient: 40 minutes. Greater than 70% of time was spent focused exclusively on this patient. Time was taken to review chart, discuss plans with consultants, reconciling medications, discussing plan answering questions with patient.      Laura Rm  9/4/2020, 1:15 PM  ----------------------------------------------------------------------------------------------------------------------    Bina Cao

## 2020-09-04 NOTE — ADT AUTH CERT
Patient Demographics     Name  Patient ID  SSN  Gender Identity  Birth Date    Kashif Alvarado  [id number]    Female  [birth year] (60 yrs)    Address  Phone  Email  Employer     [address] [employer]   [phone] (V)   [phone] (M)  --  Liang [place]ton  Race  Occupation  Emp Status     [employer]    --  Not Employed     Reg Status  PCP  Date Last Verified  Next Review Date     Verified  [facility] [employer] [facility]  [phone]  20     Admission Date  Discharge Date  Admitting Provider      20  --  Kenyetta Ferrera MD      Marital Status  Tenriism         Buddhism       Emergency Contact 1    Maryann Sandoval (2)   [phone] (H)    Garth Mason Account [de-identified]   CVG  Subscriber Name/Sex/Relation  Subscriber   Subscriber Address/Phone  Subscriber Emp/Emp Phone    1. Sana Vasquez   [id number]  Bk Samuels - Female   (Self)  1955  [address] [place] [address]   apt [address]  [address]   [phone](Z)  NONE    Utilization Reviews         COVID by Dev Allen RN         Review Status  Review Entered    In Primary  2020 08:49        Criteria Review    The illness suspected to be related to the Coronavirus (COVID-19)? No, Other  Has the member been tested for the COVID-19? Yes  If Yes, what are the results of the COVID-19? Negative  What is the severity of the members condition (i.e. Isolation, Ventilator use)?  No isolation on RA          Heart Failure - Care Day 3 (9/3/2020) by Dev Allen RN         Review Status  Review Entered    Completed  2020 08:42        Criteria Review       Care Day: 3 Care Date: 9/3/2020 Level of Care:    Guideline Day 3    Level Of Care    (X) Complete discharge planning    2020 8:42 AM EDT by Mirian Marsh      per CM    Clinical Status    (X) * Hemodynamic stability    2020 8:42 AM EDT by Mirian Marsh      140/61, 57, 18, 97.7, 100@ on RA    (X) * Tachypnea absent    9/4/2020 8:42 AM EDT by Renetta Witt      RR 16-18    (X) * Oxygenation at baseline or acceptable for next level of care    9/4/2020 8:42 AM EDT by Renetta Witt      RA    (X) * Dyspnea at baseline or acceptable for next level of care    9/4/2020 8:42 AM EDT by Renetta Witt      resolved SOB per attending. Improved SOB per cardiology    (X) * Cardiac rate and rhythm acceptable    9/4/2020 8:42 AM EDT by Renetta Witt      NSR    (X) * Pulmonary edema absent or acceptable for next level of care    9/4/2020 8:42 AM EDT by Renetta Witt      improved    (X) * Peripheral or sacral edema absent or acceptable for next level of care    9/4/2020 8:42 AM EDT by Renetta Witt      trace    (X) * Mental status at baseline    9/4/2020 8:42 AM EDT by Renetta Witt      A & O    (X) * Volume status acceptable on oral medication    9/4/2020 8:42 AM EDT by Renetta Witt      changed to po Lasix this day    (X) * Renal function at baseline or acceptable for next level of care    9/4/2020 8:42 AM EDT by Renetta Witt      B/Cr: 32/1.04    (X) * Electrolyte abnormalities absent or acceptable for next level of care    9/4/2020 8:42 AM EDT by Renetta Witt      lytes WNL    (X) * Precipitating factors absent or controlled    9/4/2020 8:42 AM EDT by Renetta Witt      controlled    ( ) * Discharge plans and education understood    Activity    (X) * Ambulatory or acceptable for next level of care    Routes    (X) * Oral hydration, medications, and diet    9/4/2020 8:42 AM EDT by Renetta Witt      po diet and meds ordered    Interventions    (X) * Oxygen absent    9/4/2020 8:42 AM EDT by Renetta Witt      RA    (X) Weigh    9/4/2020 8:42 AM EDT by Renetta Witt      daily    Medications    (X) Diuretics    9/4/2020 8:42 AM EDT by Renetta Witt      Lasix 80 mg IV BID  - 1 dose given - changed to 40 mg IV BID - 1 dose given then to 20 mg po BID - 1 dose given.  Zaroxolyn 2.5 mg po X 1    (X) Beta-blocker    9/4/2020 8:42 AM EDT by Cuong Hicks      metoprolol succinate (TOPROL XL) extended release tablet 50 mg po BID    (X) Possible nitrates, hydralazine    9/4/2020 8:42 AM EDT by Cuong Hicks      isosorbide dinitrate (ISORDIL) tablet 20 mg po TID    * Milestone    Additional Notes    Care day 3 - 9/3/2020       Physical Exam:    General appearance: No apparent distress, appears stated age and cooperative. HEENT: Conjunctivae/corneas clear. Neck:  Trachea midline. Respiratory:  Normal respiratory effort. Clear to auscultation    Cardiovascular: Regular rate and rhythm    Abdomen: Soft, non-tender, non-distended with normal bowel sounds. Musculoskeletal: trace edema    Neuro: Non Focal.    Capillary Refill: Brisk,< 3 seconds    Peripheral Pulses: +2 palpable, equal bilaterally               Radiology testing/Labs pertinent:    No imaging    LABS: Sodium 135 - 144 mEq/L 136    Potassium 3.4 - 4.9 mEq/L 4.3    Chloride 95 - 107 mEq/L 98    CO2 20 - 31 mEq/L 27    Anion Gap 9 - 15 mEq/L 11    Glucose 70 - 99 mg/dL 187High      BUN 8 - 23 mg/dL 32High      CREATININE 0.50 - 0.90 mg/dL 1. 04High      GFR Non- >60 53.6Low      GFR  >60 >60.0    Calcium 8.5 - 9.9 mg/dL 8.8    WBC 4.8 - 10.8 K/uL 10.4    RBC 4.20 - 5.40 M/uL 4.34    Hemoglobin 12.0 - 16.0 g/dL 10.5Low      Hematocrit 37.0 - 47.0 % 31.2Low      MCV 82.0 - 100.0 fL 71.9Low      MCH 27.0 - 31.3 pg 24. 2Low      MCHC 33.0 - 37.0 % 33.7    RDW 11.5 - 14.5 % 21.1High      Platelets 031 - 095 K/uL 235    SARS-CoV-2, NAAT  Not Detected  Not Detected  Final  09/02/2020  5:44 PM                  Meds-name, dose, route,rate:    Diuretics as listed above    Elavil 25 mg po nightly    Aspirin EC 81 mg po daily    Lipitor 80 mg po nightly    Lovenox 40 mg po daily    Neurontin 400 mg po BID    Haldol 5 mg po daily    Hydralazine 50 mg po TID    insulin glargine (LANTUS) injection vial 30 Units SC nightly    insulin lispro (HUMALOG) injection vial 0-3 Units SC nightly    insulin lispro (HUMALOG) injection vial 0-6 Units SC TID    insulin lispro (HUMALOG) injection vial 5 Units SC TID    isosorbide dinitrate (ISORDIL) tablet 20 mg po TID    levothyroxine (SYNTHROID) tablet 50 mcg  po daily    ticagrelor (BRILINTA) tablet 90 mg po BID    sertraline (ZOLOFT) tablet 200 mg po daily    HEP-LOCKED    PRN: Vistaril 50 mg po X 1          Treatment plan-attending, consults:    Attending Assessment/Plan:    Subjective:    Patient feels better; denies a cough over the last day; SOB has resolved; orthopnea has resolved.  12 point ROS negative other than mentioned above    1. Acute on chronic combined systolic and diastolic CHF:  increased lasix last night for another dose; patient is now close to baseline; likely d/c tomorrow    2. HTN/HLD/CAD: Continue home meds    3. DMII:  SSI; lantus    4. Hypothyroidism:  Continue home meds    5. Schizphrenia:  Continue home meds    6. Cough:  Resolved    7. Functional Status: Fall precautions. Up with assistance. PT OT    8. Diet: Diabetic    9. DVT ppx: Lovenox SCDs    10. Disposition: Dependent on hospital course. Will discharge once medically stable. SW on board for discharge planning.     Active Hospital Problems      Diagnosis Date Noted    · Heart failure, diastolic, with acute decompensation ([facility] [I50.33] 09/01/2020     Additional work up or/and treatment plan may be added today or then after based on clinical progression. I am managing a portion of pt care. Some medical issues are handled by other specialists. Additional work up and treatment should be done in out pt setting by pt PCP and other out pt providers. In addition to examining and evaluating pt, I spent additional time explaining care, normal and abnormal findings, and treatment plan. All of pt questions were answered. Counseling, diet and education were  provided. Case will be discussed with nursing staff when appropriate.  Family will be updated if and when appropriate.      Diet: DIET LOW SODIUM 2 GM; Carb Control: 3 carb choices (45 gms)/meal    Code Status: Full Code    PT/OT Eval          Cardiology Assessment:    Subjective Complaint:               Mahad Osborne is still significant language barrier, however we are able to communicate reasonably well.  The patient really denies any particular cardiac symptoms at this time.  Even her cough seems to be improved.  She denies any chest discomfort at this time, and is less short of breath. .    Symptomatology is consistent with congestive heart failure.  This seems to be improved.  She has a history of coronary disease as well as significant mitral insufficiency.     Plan:    Change to oral Lasix with metolazone    2.  Possible discharge in a.m. if symptoms continue to improve with close follow-up    See orders       Orders

## 2020-09-04 NOTE — DISCHARGE INSTR - COC
Continuity of Care Form    Patient Name: Martha Dumont   :  1955  MRN:  [id number]    Admit date:  2020  Discharge date:  ***    Code Status Order: Full Code   Advance Directives:   Advance Care Flowsheet Documentation     Date/Time Healthcare Directive Type of Healthcare Directive Copy in [address] Agent's Name Healthcare Agent's Phone Number    [phone]  No, patient does not have an advance directive for healthcare treatment -- -- -- -- --          Admitting Physician:  Courtney Whittaker MD  PCP: Cecily Arreola    Discharging Nurse: [facility] Unit/Room#: V742/S193-36  Discharging Unit Phone Number: ***    Emergency Contact:   Extended Emergency Contact Information  Primary Emergency Contact: [address] [place] [address] Phone: [phone]  Relation: Spouse    Past Surgical History:  Past Surgical History:   Procedure Laterality Date    CARDIAC SURGERY       SECTION      COLONOSCOPY N/A 2019    COLONOSCOPY DIAGNOSTIC performed by Zuly Moeller MD at [facility]  2019    unknown vessels    HYSTERECTOMY      TOE AMPUTATION Right     3rd toe       Immunization History: There is no immunization history on file for this patient.     Active Problems:  Patient Active Problem List   Diagnosis Code    Severe major depression with psychotic features ([address] F32.3    Type 2 diabetes mellitus with hyperglycemia, with long-term current use of insulin (HCC) E11.65, Z79.4    HTN (hypertension) I10    HLD (hyperlipidemia) E78.5    Hypothyroid E03.9    HF (heart failure) ([address] I50.9    Non-pressure ulcer of toe ([address] L97.509    Atherosclerotic PVD with intermittent claudication ([address] I70.219    Acute osteomyelitis ([address] M86.10    Skin infection L08.9    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 A49.8    Surgical wound dehiscence, initial encounter T81.31XA    S/P amputation of lesser toe, right ([facility]) Z89.421    Rectal bleeding K62.5    Athscl native arteries of left leg w ulceration oth prt foot ([facility]) I70.245    JESICA (obstructive sleep apnea) G47.33    Neuropathy due to secondary diabetes ([facility]) E13.40    Chest pain R07.9    PAD (peripheral artery disease) ([facility]) I73.9    Cellulitis L03.90    Syncope and collapse R55    Severe mitral regurgitation I34.0    Ischemic cardiomyopathy I25.5    Pulmonary hypertension ([facility]) I27.20    Acute on chronic combined systolic and diastolic congestive heart failure ([facility]) I50.43    Nocturnal hypoxia G47.34    RADHA (acute kidney injury) ([facility]) N17.9    Dizziness R42    Acute CVA (cerebrovascular accident) ([facility] I63.9    Gait abnormality R26.9    Anxiety F41.9    Gastritis, unspecified, without bleeding K29.70    Pure hypercholesterolemia E78.00    Schizophrenia, unspecified ([facility] F20.9    CAD in native artery I25.10    Extrapyramidal syndrome G25.9    Gangrene of toe of left foot ([facility]) I96    Hypotension due to drugs I95.2    Osteomyelitis of right foot ([facility]) M86.9    Nausea and vomiting R11.2    Mild intermittent asthma without complication D60.11    Heart failure, diastolic, with acute decompensation ([facility]) I50.33       Isolation/Infection:   Isolation          Contact        Patient Infection Status     Infection Onset Added Last Indicated Last Indicated By Review Planned Expiration Resolved Resolved By    MRSA 08/09/19 08/11/19 03/05/20 Culture, Tissue        MRSA Left foot tissue on 3/5/2020. Electronically signed by Chanel Estrada RN on 3/9/2020 at 2:14 PM     MRSA left 3rd toe on 8/9/2019.  Electronically signed by Chanel Estrada RN on 8/11/2019 at 9:15 PM     Resolved    COVID-19 Rule Out 09/02/20 09/02/20 09/02/20 COVID-19 (Ordered)   09/02/20 Rule-Out Test Resulted          Nurse Assessment:  Last Vital Signs: BP (!) 126/47   Pulse 55   Temp 97.8 °F (36.6 °C) (Oral)   Resp 20   Ht 5' 4\" (1.626 m) Wt 247 lb 11.2 oz (112.4 kg)   SpO2 100%   BMI 42.52 kg/m²     Last documented pain score (0-10 scale): Pain Level: 0  Last Weight:   Wt Readings from Last 1 Encounters:   20 247 lb 11.2 oz (112.4 kg)     Mental Status:  {IP PT MENTAL STATUS:}    IV Access:  { ARIANNE IV ACCESS:747031914}    Nursing Mobility/ADLs:  Walking   {CHP DME XJMU:365283274}  Transfer  {CHP DME NULS:508293892}  Bathing  {CHP DME WLTS:318761445}  Dressing  {CHP DME RVQF:258175381}  Toileting  {CHP DME NWOU:712096090}  Feeding  {CHP DME EEEU:039428309}  Med Admin  {CHP DME PPGL:749426580}  Med Delivery   { ARIANNE MED Delivery:667646255}    Wound Care Documentation and Therapy:        Elimination:  Continence:   · Bowel: {YES / EP:16765}  · Bladder: {YES / HC:49308}  Urinary Catheter: {Urinary Catheter:314577342}   Colostomy/Ileostomy/Ileal Conduit: {YES / AT:26026}       Date of Last BM: ***  No intake or output data in the 24 hours ending 20 1307  I/O last 3 completed shifts:   In: 5 [I.V.:5]  Out: -     Safety Concerns:     [address] Safety Concerns:362253674}    Impairments/Disabilities:      [address] Impairments/Disabilities:626336114}    Nutrition Therapy:  Current Nutrition Therapy:   [address] Diet List:695736381}    Routes of Feeding: {CHP DME Other Feedings:984885486}  Liquids: {Slp liquid thickness:51886}  Daily Fluid Restriction: {CHP DME Yes amt example:697233944}  Last Modified Barium Swallow with Video (Video Swallowing Test): {Done Not Done YIKF:604043739}    Treatments at the Time of Hospital Discharge:   Respiratory Treatments: ***  Oxygen Therapy:  {Therapy; copd oxygen:00562}  Ventilator:    { CC Vent AIFN:940989835}    Rehab Therapies: {THERAPEUTIC INTERVENTION:0510330334}  Weight Bearing Status/Restrictions: [address]  Weight Bearin}  Other Medical Equipment (for information only, NOT a DME order):  {EQUIPMENT:835399832}  Other Treatments: ***    Patient's personal belongings (please select all that are

## 2020-09-05 ENCOUNTER — CARE COORDINATION (OUTPATIENT)
Dept: CASE MANAGEMENT | Age: 65
End: 2020-09-05

## 2020-09-05 NOTE — CARE COORDINATION
exposure line [phone], local [facility] department [place] [facility]: ([phone]) and PCP office for questions related to their healthcare. CTN/ACM provided contact information for future needs. Reviewed and educated patient on any new and changed medications related to discharge diagnosis     Patient/family/caregiver given information for [employer] Loop and agrees to enroll no  Patient's preferred e-mail:   Patient's preferred phone number:   Based on Loop alert triggers, patient will be contacted by nurse care manager for worsening symptoms. Plan for follow-up call in 5-7 days based on severity of symptoms and risk factors.   Patient speaks limited [place], he asked his wife some of the questions when writer spoke to him and she would answer with dana Mccray also//ezequiel

## 2020-09-06 LAB
BLOOD CULTURE, ROUTINE: NORMAL
CULTURE, BLOOD 2: NORMAL

## 2020-09-08 ENCOUNTER — OFFICE VISIT (OUTPATIENT)
Dept: NEUROLOGY | Age: 65
End: 2020-09-08
Payer: COMMERCIAL

## 2020-09-08 VITALS
SYSTOLIC BLOOD PRESSURE: 138 MMHG | BODY MASS INDEX: 39.63 KG/M2 | WEIGHT: 230.9 LBS | DIASTOLIC BLOOD PRESSURE: 49 MMHG | HEART RATE: 65 BPM

## 2020-09-08 PROCEDURE — 1111F DSCHRG MED/CURRENT MED MERGE: CPT | Performed by: NURSE PRACTITIONER

## 2020-09-08 PROCEDURE — 99213 OFFICE O/P EST LOW 20 MIN: CPT | Performed by: NURSE PRACTITIONER

## 2020-09-08 PROCEDURE — G8417 CALC BMI ABV UP PARAM F/U: HCPCS | Performed by: NURSE PRACTITIONER

## 2020-09-08 PROCEDURE — G8427 DOCREV CUR MEDS BY ELIG CLIN: HCPCS | Performed by: NURSE PRACTITIONER

## 2020-09-08 PROCEDURE — 3017F COLORECTAL CA SCREEN DOC REV: CPT | Performed by: NURSE PRACTITIONER

## 2020-09-08 PROCEDURE — 1036F TOBACCO NON-USER: CPT | Performed by: NURSE PRACTITIONER

## 2020-09-08 NOTE — ADT AUTH CERT
Utilization Reviews         COVID by Evin Barker RN         Review Status  Review Entered    In Primary  9/4/2020 08:49        Criteria Review    The illness suspected to be related to the Coronavirus (COVID-19)? No, Other  Has the member been tested for the COVID-19? Yes  If Yes, what are the results of the COVID-19? Negative  What is the severity of the members condition (i.e. Isolation, Ventilator use)? No isolation on RA          Heart Failure - Care Day 3 (9/3/2020) by Evin Barker RN         Review Status  Review Entered    Completed  9/4/2020 08:42        Criteria Review       Care Day: 3 Care Date: 9/3/2020 Level of Care:    Guideline Day 3    Level Of Care    (X) Complete discharge planning    9/4/2020 8:42 AM EDT by Ginette Herrera      per CM    Clinical Status    (X) * Hemodynamic stability    9/4/2020 8:42 AM EDT by Ginette Herrera      140/61, 57, 18, 97.7, 100@ on RA    (X) * Tachypnea absent    9/4/2020 8:42 AM EDT by Ginette Herrera      RR 16-18    (X) * Oxygenation at baseline or acceptable for next level of care    9/4/2020 8:42 AM EDT by Ginette Herrera      RA    (X) * Dyspnea at baseline or acceptable for next level of care    9/4/2020 8:42 AM EDT by Ginette Herrera      resolved SOB per attending.  Improved SOB per cardiology    (X) * Cardiac rate and rhythm acceptable    9/4/2020 8:42 AM EDT by Ginette Herrera      NSR    (X) * Pulmonary edema absent or acceptable for next level of care    9/4/2020 8:42 AM EDT by Ginette Herrera      improved    (X) * Peripheral or sacral edema absent or acceptable for next level of care    9/4/2020 8:42 AM EDT by Ginette Herrera      trace    (X) * Mental status at baseline    9/4/2020 8:42 AM EDT by Ginette Herrera      A & O    (X) * Volume status acceptable on oral medication    9/4/2020 8:42 AM EDT by Ginette Herrera      changed to po Lasix this day    (X) * Renal function at baseline or acceptable for next level of care    9/4/2020 8:42 AM EDT by Jeff Pierre B/Cr: 32/1.04    (X) * Electrolyte abnormalities absent or acceptable for next level of care    9/4/2020 8:42 AM EDT by Susie Perez      lytes WNL    (X) * Precipitating factors absent or controlled    9/4/2020 8:42 AM EDT by Susie Perez      controlled    ( ) * Discharge plans and education understood    Activity    (X) * Ambulatory or acceptable for next level of care    Routes    (X) * Oral hydration, medications, and diet    9/4/2020 8:42 AM EDT by Susie Perez      po diet and meds ordered    Interventions    (X) * Oxygen absent    9/4/2020 8:42 AM EDT by Susie Perez      RA    (X) Weigh    9/4/2020 8:42 AM EDT by Susie Perez      daily    Medications    (X) Diuretics    9/4/2020 8:42 AM EDT by Susie Perez      Lasix 80 mg IV BID  - 1 dose given - changed to 40 mg IV BID - 1 dose given then to 20 mg po BID - 1 dose given. Zaroxolyn 2.5 mg po X 1    (X) Beta-blocker    9/4/2020 8:42 AM EDT by Susie Perez      metoprolol succinate (TOPROL XL) extended release tablet 50 mg po BID    (X) Possible nitrates, hydralazine    9/4/2020 8:42 AM EDT by Susie Perez      isosorbide dinitrate (ISORDIL) tablet 20 mg po TID    * Milestone    Additional Notes    Care day 3 - 9/3/2020       Physical Exam:    General appearance: No apparent distress, appears stated age and cooperative. HEENT: Conjunctivae/corneas clear. Neck:  Trachea midline. Respiratory:  Normal respiratory effort. Clear to auscultation    Cardiovascular: Regular rate and rhythm    Abdomen: Soft, non-tender, non-distended with normal bowel sounds.     Musculoskeletal: trace edema    Neuro: Non Focal.    Capillary Refill: Brisk,< 3 seconds    Peripheral Pulses: +2 palpable, equal bilaterally               Radiology testing/Labs pertinent:    No imaging    LABS: Sodium 135 - 144 mEq/L 136    Potassium 3.4 - 4.9 mEq/L 4.3    Chloride 95 - 107 mEq/L 98    CO2 20 - 31 mEq/L 27    Anion Gap 9 - 15 mEq/L 11    Glucose 70 - 99 mg/dL 187High      BUN 8 -

## 2020-09-09 ASSESSMENT — ENCOUNTER SYMPTOMS
VOMITING: 0
CHEST TIGHTNESS: 0
WHEEZING: 0
NAUSEA: 0
COUGH: 0
SHORTNESS OF BREATH: 0
TROUBLE SWALLOWING: 0
COLOR CHANGE: 0

## 2020-09-09 NOTE — PROGRESS NOTES
Subjective:      Patient ID: Gogo Kiran is a 60 y.o. female who presents today for:  Chief Complaint   Patient presents with    Follow-up     Pt. is having with sleeping. She is getting 1-2 hours or not at all and if she is sleping the 1-2 hours and gets up she can not go back to sleep and this has happened since the stroke. patient is walking with walker. HPI  Pt seen and examined in the office for 3-month follow-up visit for vertigo with posterior right frontal lobe possible CVA. Although upon review of MRI Dr. Kecia Loo thought that this could possibly be a vessel and not an acute CVA. [employer] Patient was last seen on 6/9/2020. She is currently alert and oriented x3, no acute distress, cooperative. She remains on dual antiplatelet therapy with Brilinta and aspirin. No unusual signs or symptoms of bleeding or bruising. Dizziness resolved. No focal deficits. Patient does have known carotid stenosis 50 to 69% in the left internal carotid artery. Patient's only complaint today is of insomnia. She reports that she is unable to sleep at times due to anxiety. Patient is already tried melatonin with no good effect. She has multiple allergies to typical medications that we used to treat insomnia such as Ambien, mirtazapine, trazodone. She is also has multiple allergies to antidepressants and SSRIs which we would use to treat her anxiety. She is being followed by pulmonology and had a sleep study on 1019 that showed severe obstructive sleep apnea with need for CPAP. Patient reports she is not using CPAP.   Past Medical History:   Diagnosis Date    Asthma     CAD (coronary artery disease)     CKD (chronic kidney disease) stage 3, GFR 30-59 ml/min ([facility])     Colitis     Diabetes mellitus ([facility]     Hyperlipidemia     Hypertension     PAD (peripheral artery disease) ([facility])     Prolonged emergence from general anesthesia     PVD (peripheral vascular disease) ([facility]     Thyroid disease      Past shower, Shower chair    Home Equipment: Rolling walker, [address] bed    ADL Assistance: Needs assistance    Homemaking Assistance: Needs assistance    Homemaking Responsibilities: No    Ambulation Assistance: Independent    Transfer Assistance: Independent    Active : No    Additional Comments: Pt wears orthopedic shoes at baseline    The patient states she is current with [facility](RN per the ). The patient had a walker, but obtained a hospital bed, wheel chair, BSC and O2 last admission (from [address]). pharmacy is [address] [place] [address],[address] [id number]., [facility]. Transportation is provided by [employer]	Will () per the patient     No family history on file.   Allergies   Allergen Reactions    Ambien [Zolpidem Tartrate]     Capoten [Captopril]     Clioquinol     Cogentin [Benztropine]     Depakote [Divalproex Sodium]     Effexor Xr [Venlafaxine Hcl Er]     Geodon [Ziprasidone Hcl]     Lisinopril      Hyperkalemia: 4/21/20 potassium was 6.7    Lyrica [Pregabalin]     Navane [Thiothixene]     Pamelor [Nortriptyline Hcl]     Remeron [Mirtazapine]     Risperdal [Risperidone]     Trazodone And Nefazodone     Wellbutrin [Bupropion]      Current Outpatient Medications on File Prior to Visit   Medication Sig Dispense Refill    furosemide (LASIX) 40 MG tablet Take 1 tablet by mouth daily 60 tablet 3    magnesium oxide (MAG-OX) 400 MG tablet Take 1 tablet by mouth daily 30 tablet 1    ticagrelor (BRILINTA) 90 MG TABS tablet Take 90 mg by mouth 2 times daily      CPAP Machine MISC by Does not apply route New CPAP with 10 cm 1 each 0    albuterol (PROVENTIL) (2.5 MG/3ML) 0.083% nebulizer solution Take 3 mLs by nebulization every 6 hours as needed for Wheezing 120 each 5    montelukast (SINGULAIR) 10 MG tablet Take 1 tablet by mouth nightly 30 tablet 0    aspirin 81 MG EC tablet Take 1 tablet by mouth daily 30 tablet 3    atorvastatin (LIPITOR) 80 MG tablet Take 1 tablet by mouth nightly 30 tablet 3    insulin lispro, 1 Unit Dial, (HUMALOG KWIKPEN) 100 UNIT/ML SOPN 10 units at each meals 10 pen 03    Insulin Pen Needle (NOVOFINE) 32G X 6 MM MISC qid 300 each 3    Continuous Blood Gluc  (FREESTYLE FELY 14 DAY READER) CATHLEEN As directed 1 Device 00    Continuous Blood Gluc Sensor (FREESTYLE FELY 14 DAY SENSOR) MISC Every 2 weeks 2 each 06    OXYGEN 2 lit O2 with sleep , please give O2 concentrator 1 Units 0    Emollient (EUCERIN INTENSIVE REPAIR HAND) 2.5-10 % CREA APPLY TO FEET AT BEDTIME; PLACE SOCKS OVER FEET AFTER APPLICATION IF NEEDED FOR DRY CRACKING FEET      Nutritional Supplements (GLUCERNA SHAKE) LIQD take as directed three times a day      insulin glargine (LANTUS) 100 UNIT/ML injection vial Inject 30 Units into the skin nightly (Patient taking differently: Inject 35 Units into the skin nightly ) 1 vial 3    isosorbide dinitrate (ISORDIL) 20 MG tablet Take 1 tablet by mouth 3 times daily 90 tablet 3    nitroGLYCERIN (NITROSTAT) 0.4 MG SL tablet up to max of 3 total doses. If no relief after 1 dose, call 911. 25 tablet 3    hydrALAZINE (APRESOLINE) 50 MG tablet Take 1 tablet by mouth every 8 hours 90 tablet 3    metoprolol succinate (TOPROL XL) 50 MG extended release tablet Take 1 tablet by mouth 2 times daily 30 tablet 3    albuterol sulfate HFA (VENTOLIN HFA) 108 (90 Base) MCG/ACT inhaler Inhale 2 puffs into the lungs as needed for Wheezing Every 4-6 hours PRN      acetaminophen (TYLENOL) 500 MG tablet Take 500 mg by mouth 3 times daily Take with Tramadol      gabapentin (NEURONTIN) 400 MG capsule Take 400 mg by mouth 2 times daily.  AM and 800 mg in pm-9pm      sertraline (ZOLOFT) 50 MG tablet Take 4 tablets by mouth daily      haloperidol (HALDOL) 5 MG tablet Take 5 mg by mouth daily      FreeStyle Lancets MISC 1 each by Does not apply route daily 100 each 3    blood glucose test strips (FREESTYLE LITE) strip 1 each by In Vitro route daily As needed. 100 each 3    Alcohol Swabs (ALCOHOL PREP) 70 % PADS qid (Patient taking differently: Apply 1 each topically 4 times daily qid) 300 each 06    Insulin Pen Needle (NOVOFINE) 32G X 6 MM MISC Qid (Patient taking differently: 1 each 3 times daily Qid) 214 each 3    folic acid (FOLVITE) 1 MG tablet Take 1 mg by mouth daily      Iron Polysacch [id number] (NIFEREX-150 FORTE PO) Take 1 tablet by mouth daily       Cyanocobalamin (VITAMIN B-12) 1000 MCG extended release tablet Take 1,000 mcg by mouth daily      insulin lispro (HUMALOG) 100 UNIT/ML injection vial Inject 0-6 Units into the skin 3 times daily (with meals) 1 vial 3    levothyroxine (SYNTHROID) 50 MCG tablet Take 50 mcg by mouth Daily      meclizine (ANTIVERT) 25 MG tablet Take 25 mg by mouth three times daily      hydrOXYzine (VISTARIL) 25 MG capsule Take 50 mg by mouth 3 times daily as needed       amitriptyline (ELAVIL) 25 MG tablet Take 25 mg by mouth nightly       No current facility-administered medications on file prior to visit. Review of Systems   Constitutional: Negative for appetite change, chills, fatigue and fever. HENT: Negative for hearing loss and trouble swallowing. Eyes: Negative for visual disturbance. Respiratory: Negative for cough, chest tightness, shortness of breath and wheezing. Cardiovascular: Negative for chest pain, palpitations and leg swelling. Gastrointestinal: Negative for nausea and vomiting. Musculoskeletal: Positive for gait problem. Skin: Negative for color change and rash. Neurological: Negative for dizziness, tremors, seizures, syncope, facial asymmetry, speech difficulty, weakness, light-headedness, numbness and headaches. Psychiatric/Behavioral: Positive for sleep disturbance. Negative for agitation, confusion and hallucinations. The patient is nervous/anxious.         Objective:   BP (!) 138/49 (Site: Right Upper Arm, Position: Sitting, Cuff Size: Medium Adult)   Pulse 65   Wt 230 lb 14.4 oz (104.7 kg)   BMI 39.63 kg/m²     Physical Exam  Vitals signs and nursing note reviewed. Constitutional:       General: She is not in acute distress. Appearance: She is not diaphoretic. HENT:      Head: Normocephalic and atraumatic. Eyes:      Extraocular Movements: Extraocular movements intact. Pupils: Pupils are equal, round, and reactive to light. Cardiovascular:      Rate and Rhythm: Normal rate and regular rhythm. Pulmonary:      Effort: Pulmonary effort is normal. No respiratory distress. Breath sounds: Normal breath sounds. Skin:     General: Skin is warm and dry. Neurological:      General: No focal deficit present. Mental Status: She is alert and oriented to person, place, and time. Mental status is at baseline. Cranial Nerves: No cranial nerve deficit. Motor: No tremor or seizure activity. Cta Chest W Wo Contrast    Result Date: 9/1/2020  The EXAMINATION: CT scan of the chest with contrast (pulmonary embolism protocol) INDICATION: Chest pain and shortness of breath. COMPARISON: None TECHNIQUE: Helical CT was performed through the chest utilizing 100 cc of Isovue-370 intravenous contrast.  Images were obtained with bolus tracking in order to opacify the pulmonary arteries. There is no comparison available. Both MIP and 3D volume rendered reconstructions were performed. FINDINGS: There are no findings of central, and/or proximal pulmonary emboli. . There is patchy multifocal areas of groundglass infiltrates throughout the lung parenchyma. There is a trace right pleural effusion. No pneumothoraces. No significant periaortic, pretracheal, parahilar adenopathy. There is subcarinal adenopathy. In the field-of-view there is a nodule in the left lobe of thyroid measuring 1.4 cm. There is a mild thoracic kyphosis with multilevel degenerative changes. 1. NO EVIDENCE OF CENTRAL OR SEGMENTAL PULMONARY EMBOLISM.  2. THERE ARE BILATERAL PATCHY MULTIFOCAL AREAS OF GROUNDGLASS OPACITIES THROUGHOUT THE LUNG PARENCHYMA. THERE IS SIMILAR PATTERN AND DISTRIBUTION AS COMPARED TO PRIOR EXAMINATION. MAY BE REFLECTIVE OF UNDERLYING PULMONARY EDEMA. ALTHOUGH I CANNOT COMPLETELY EXCLUDE A T SUPERIMPOSED PNEUMONITIS, ATYPICAL PNEUMONIA. IMAGING FEATURES CAN BE SEEN WITH (COVID-19) PNEUMONIA, THOUGH ARE NONSPECIFIC AND CAN OCCUR WITH A VARIETY OF INFECTIOUS AND NONINFECTIOUS PROCESSES. 3. Cholelithiasis. 1.  All CT scans at this facility use dose modulation, iterative reconstruction, and/or weight based dosing when appropriate to reduce radiation dose to as low as reasonably achievable.        Lab Results   Component Value Date    WBC 9.2 09/04/2020    RBC 4.27 09/04/2020    HGB 10.2 09/04/2020    HCT 31.0 09/04/2020    MCV 72.5 09/04/2020    MCH 23.9 09/04/2020    MCHC 33.0 09/04/2020    RDW 21.4 09/04/2020     09/04/2020     Lab Results   Component Value Date     09/04/2020    K 4.1 09/04/2020    K 4.6 05/07/2020    CL 97 09/04/2020    CO2 30 09/04/2020    BUN 33 09/04/2020    CREATININE 1.19 09/04/2020    GFRAA 55.5 09/04/2020    LABGLOM 45.8 09/04/2020    GLUCOSE 117 09/04/2020    GLUCOSE 279 01/06/2020    PROT 6.9 09/02/2020    LABALBU 3.4 09/02/2020    LABALBU <7.0 01/06/2020    CALCIUM 8.8 09/04/2020    BILITOT 0.6 09/02/2020    ALKPHOS 97 09/02/2020    AST 15 09/02/2020    ALT 15 09/02/2020     Lab Results   Component Value Date    PROTIME 13.9 09/01/2020    INR 1.1 09/01/2020     Lab Results   Component Value Date    TSH 1.380 09/01/2020    FERRITIN 80.1 09/02/2020    IRON 126 02/12/2020    TIBC 295 02/12/2020     Lab Results   Component Value Date    TRIG 75 09/02/2020    HDL 35 09/02/2020    LDLCALC 55 09/02/2020     Lab Results   Component Value Date    LABAMPH Neg 04/21/2020    BARBSCNU Neg 04/21/2020    LABBENZ POSITIVE 04/21/2020    LABMETH Neg 04/21/2020    OPIATESCREENURINE Neg 04/21/2020 PHENCYCLIDINESCREENURINE Neg 04/21/2020    ETOH <10 04/21/2020     No results found for: LITHIUM, DILFRTOT, VALPROATE    Assessment and Plan:      1. Cerebrovascular accident (CVA), unspecified mechanism ([facility]  -Stable, nonfocal  -Continue dual antiplatelet therapy and statin  -Discussed need for risk factor modification to reduce risk of future CVA    2. Stenosis of carotid artery, left  -Repeat carotid ultrasound in 1 year April 2021    3. Insomnia, unspecified type  -Discussed sleep hygiene  -Encouraged use of CPAP  -Encouraged patient to follow-up with psychiatry to treat underlying anxiety to help with sleep disturbance  -Patient with multiple allergies to typical medications used to treat insomnia such as Ambien, trazodone, mirtazapine. Also with multiple allergies to antidepressants and SSRIs which we would use to treat her anxiety. Return in about 6 months (around 3/8/2021), or if symptoms worsen or fail to improve.     Amairani Cueva, APRN - CNP

## 2020-09-18 RX ORDER — MONTELUKAST SODIUM 10 MG/1
TABLET ORAL
Qty: 30 TABLET | Refills: 0 | Status: SHIPPED | OUTPATIENT
Start: 2020-09-18 | End: 2021-02-05 | Stop reason: SDUPTHER

## 2020-09-21 ENCOUNTER — OFFICE VISIT (OUTPATIENT)
Dept: ENDOCRINOLOGY | Age: 65
End: 2020-09-21
Payer: COMMERCIAL

## 2020-09-21 ENCOUNTER — CARE COORDINATION (OUTPATIENT)
Dept: CASE MANAGEMENT | Age: 65
End: 2020-09-21

## 2020-09-21 VITALS
DIASTOLIC BLOOD PRESSURE: 46 MMHG | WEIGHT: 239 LBS | OXYGEN SATURATION: 95 % | HEART RATE: 64 BPM | SYSTOLIC BLOOD PRESSURE: 111 MMHG | BODY MASS INDEX: 41.02 KG/M2

## 2020-09-21 LAB
CHP ED QC CHECK: NORMAL
GLUCOSE BLD-MCNC: 210 MG/DL

## 2020-09-21 PROCEDURE — 99213 OFFICE O/P EST LOW 20 MIN: CPT | Performed by: INTERNAL MEDICINE

## 2020-09-21 PROCEDURE — G8417 CALC BMI ABV UP PARAM F/U: HCPCS | Performed by: INTERNAL MEDICINE

## 2020-09-21 PROCEDURE — 1036F TOBACCO NON-USER: CPT | Performed by: INTERNAL MEDICINE

## 2020-09-21 PROCEDURE — 1111F DSCHRG MED/CURRENT MED MERGE: CPT | Performed by: INTERNAL MEDICINE

## 2020-09-21 PROCEDURE — 3052F HG A1C>EQUAL 8.0%<EQUAL 9.0%: CPT | Performed by: INTERNAL MEDICINE

## 2020-09-21 PROCEDURE — G8427 DOCREV CUR MEDS BY ELIG CLIN: HCPCS | Performed by: INTERNAL MEDICINE

## 2020-09-21 PROCEDURE — 82962 GLUCOSE BLOOD TEST: CPT | Performed by: INTERNAL MEDICINE

## 2020-09-21 PROCEDURE — 3017F COLORECTAL CA SCREEN DOC REV: CPT | Performed by: INTERNAL MEDICINE

## 2020-09-21 PROCEDURE — 2022F DILAT RTA XM EVC RTNOPTHY: CPT | Performed by: INTERNAL MEDICINE

## 2020-09-21 RX ORDER — UBIQUINOL 100 MG
CAPSULE ORAL
Qty: 300 EACH | Refills: 6 | Status: SHIPPED | OUTPATIENT
Start: 2020-09-21 | End: 2021-03-24 | Stop reason: SDUPTHER

## 2020-09-21 RX ORDER — INSULIN LISPRO 100 [IU]/ML
INJECTION, SOLUTION INTRAVENOUS; SUBCUTANEOUS
Qty: 10 PEN | Refills: 3 | Status: SHIPPED | OUTPATIENT
Start: 2020-09-21 | End: 2021-03-23 | Stop reason: SDUPTHER

## 2020-09-21 RX ORDER — INSULIN GLARGINE 100 [IU]/ML
INJECTION, SOLUTION SUBCUTANEOUS
Qty: 10 PEN | Refills: 3 | Status: ON HOLD | OUTPATIENT
Start: 2020-09-21 | End: 2020-09-29

## 2020-09-21 RX ORDER — FLASH GLUCOSE SENSOR
KIT MISCELLANEOUS
Qty: 2 EACH | Refills: 6 | Status: SHIPPED | OUTPATIENT
Start: 2020-09-21 | End: 2021-01-01 | Stop reason: SDUPTHER

## 2020-09-21 RX ORDER — FLASH GLUCOSE SCANNING READER
EACH MISCELLANEOUS
Qty: 1 DEVICE | Refills: 0 | Status: SHIPPED | OUTPATIENT
Start: 2020-09-21 | End: 2021-01-01 | Stop reason: SDUPTHER

## 2020-09-21 RX ORDER — LEVOTHYROXINE SODIUM 0.05 MG/1
50 TABLET ORAL DAILY
Qty: 30 TABLET | Refills: 5 | Status: SHIPPED | OUTPATIENT
Start: 2020-09-21 | End: 2021-03-03

## 2020-09-21 NOTE — PROGRESS NOTES
Subjective:      Patient ID: Ian Hair is a 60 y.o. female. 3-month follow-up on type 2 diabetes patient requesting freestyle malu but unable to get it history of hypothyroidism on thyroid replacement currently on Lantus plus Humalog last A1c was 8.2 down from 9.4 before  Diabetes   She presents for her follow-up diabetic visit. She has type 2 diabetes mellitus. Symptoms are improving. Diabetic complications include PVD. Current diabetic treatment includes insulin injections. She is currently taking insulin pre-breakfast, pre-lunch, pre-dinner and at bedtime. Her bedtime blood glucose range is generally 180-200 mg/dl. (Lab Results       Component                Value               Date                       LABA1C                   8.2                 08/31/2020            )       Results for Sarai Ziegler (MRN [id number]) as of 9/21/2020 15:27   Ref. Range 3/5/2020 05:21 5/7/2020 05:51 5/29/2020 08:42 6/18/2020 11:49 8/31/2020 09:48   Hemoglobin A1C Latest Units: % 8.0 (H) 7.7 (H) 8.2 9.4 8.2       Results for Sarai Ziegler (MRN [id number]) as of 9/21/2020 15:27   Ref.  Range 9/4/2020 06:25 9/4/2020 11:15 9/21/2020 15:08   Sodium Latest Ref Range: 135 - 144 mEq/L 136     Potassium Latest Ref Range: 3.4 - 4.9 mEq/L 4.1     Chloride Latest Ref Range: 95 - 107 mEq/L 97     CO2 Latest Ref Range: 20 - 31 mEq/L 30     BUN Latest Ref Range: 8 - 23 mg/dL 33 (H)     Creatinine Latest Ref Range: 0.50 - 0.90 mg/dL 1.19 (H)     Anion Gap Latest Ref Range: 9 - 15 mEq/L 9     GFR Non- Latest Ref Range: >60  45.8 (L)     GFR  Latest Ref Range: >60  55.5 (L)     Glucose Latest Units: mg/dL 117 (H)  210   POC Glucose Latest Ref Range: 60 - 115 mg/dl  184 (H)    Calcium Latest Ref Range: 8.5 - 9.9 mg/dL 8.8       Patient Active Problem List   Diagnosis    Severe major depression with psychotic features (HCC)    Type 2 diabetes mellitus with hyperglycemia, with long-term current use of insulin ([facility]    HTN (hypertension)    HLD (hyperlipidemia)    Hypothyroid    HF (heart failure) [facility]    Non-pressure ulcer of toe (HCC)    Atherosclerotic PVD with intermittent claudication ([facility])    Acute osteomyelitis ([facility]    Infection of skin    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    Surgical wound dehiscence, initial encounter    S/P amputation of lesser toe, right (HCC)    Rectal bleeding    Athscl native arteries of left leg w ulceration oth prt foot ([facility]    JESICA (obstructive sleep apnea)    Secondary diabetes mellitus ([facility]    Chest pain    Peripheral vascular disease ([facility]    Cellulitis    Syncope and collapse    Severe mitral regurgitation    Ischemic myocardial dysfunction    Pulmonary hypertension ([facility]    Acute on chronic combined systolic and diastolic congestive heart failure ([facility])    Nocturnal hypoxia    RADHA (acute kidney injury) ([facility])    Dizziness    Acute cerebrovascular accident ([facility]    Abnormal gait    Anxiety    Gastritis, unspecified, without bleeding    Pure hypercholesterolemia    Schizophrenia, unspecified ([facility]    CAD in native artery    Extrapyramidal disease    Gangrene of toe of left foot ([facility])    Drug-induced hypotension    Osteomyelitis of right foot ([facility])    Nausea and vomiting    Mild intermittent asthma    Heart failure, diastolic, with acute decompensation ([facility])    Major depressive disorder, single episode, unspecified    Type 2 diabetes mellitus with diabetic neuropathy, unspecified ([facility])     Allergies   Allergen Reactions    Ambien [Zolpidem Tartrate]     Capoten [Captopril]     Clioquinol     Cogentin [Benztropine]     Depakote [Divalproex Sodium]     Effexor Xr [Venlafaxine Hcl Er]     Geodon [Ziprasidone Hcl]     Lisinopril      Hyperkalemia: 4/21/20 potassium was 6.7    Lyrica [Pregabalin]     Navane [Thiothixene]     Pamelor [Nortriptyline Hcl]     Remeron [Mirtazapine]     Risperdal [Risperidone]  Trazodone And Nefazodone     Wellbutrin [Bupropion]        Current Outpatient Medications:     levothyroxine (SYNTHROID) 50 MCG tablet, Take 1 tablet by mouth Daily, Disp: 30 tablet, Rfl: 5    insulin lispro, 1 Unit Dial, (HUMALOG KWIKPEN) 100 UNIT/ML SOPN, 10 units at each meals, Disp: 10 pen, Rfl: 03    Alcohol Swabs (ALCOHOL PREP) 70 % PADS, qid, Disp: 300 each, Rfl: 06    insulin glargine (LANTUS SOLOSTAR) 100 UNIT/ML injection pen, 40 units at bedtime, Disp: 10 pen, Rfl: 3    Continuous Blood Gluc Sensor (FREESTYLE FELY 14 DAY SENSOR) MISC, Every 2 weeks, Disp: 2 each, Rfl: 06    Continuous Blood Gluc  (FREESTYLE FELY 14 DAY READER) CATHLEEN, As directed, Disp: 1 Device, Rfl: 00    Insulin Pen Needle (NOVOFINE) 32G X 6 MM MISC, qid, Disp: 300 each, Rfl: 3    montelukast (SINGULAIR) 10 MG tablet, take 1 tablet by mouth at bedtime, Disp: 30 tablet, Rfl: 0    furosemide (LASIX) 40 MG tablet, Take 1 tablet by mouth daily, Disp: 60 tablet, Rfl: 3    magnesium oxide (MAG-OX) 400 MG tablet, Take 1 tablet by mouth daily, Disp: 30 tablet, Rfl: 1    ticagrelor (BRILINTA) 90 MG TABS tablet, Take 90 mg by mouth 2 times daily, Disp: , Rfl:     CPAP Machine MISC, by Does not apply route New CPAP with 10 cm, Disp: 1 each, Rfl: 0    aspirin 81 MG EC tablet, Take 1 tablet by mouth daily, Disp: 30 tablet, Rfl: 3    atorvastatin (LIPITOR) 80 MG tablet, Take 1 tablet by mouth nightly, Disp: 30 tablet, Rfl: 3    Insulin Pen Needle (NOVOFINE) 32G X 6 MM MISC, qid, Disp: 300 each, Rfl: 3    OXYGEN, 2 lit O2 with sleep , please give O2 concentrator, Disp: 1 Units, Rfl: 0    Emollient (EUCERIN INTENSIVE REPAIR HAND) 2.5-10 % CREA, APPLY TO FEET AT BEDTIME; PLACE SOCKS OVER FEET AFTER APPLICATION IF NEEDED FOR DRY CRACKING FEET, Disp: , Rfl:     hydrOXYzine (VISTARIL) 25 MG capsule, Take 50 mg by mouth 3 times daily as needed , Disp: , Rfl:     Nutritional Supplements ([address]) LIQD, take as directed Dispense:  2 each     Refill:  06    Continuous Blood Gluc  (FREESTYLE FELY 14 DAY READER) CATHLEEN     Sig: As directed     Dispense:  1 Device     Refill:  00     Continue current dose of Lantus Humalog Synthroid follow-up in 3 months A1c goal of 7 or lower        Eliane Rebollar MD

## 2020-09-21 NOTE — CARE COORDINATION
-2020 [id number] HF  2020 CV-19 lab neg. Patient contacted regarding COVID-19 risk and screening. Discussed COVID-19 related testing which was available at this time. Test results were negative. Patient informed of results, if available? Yes. Care Transition Nurse/ Ambulatory Care Manager contacted the family by telephone to perform follow-up assessment. Verified name and  with family as identifiers. Patient has following risk factors of: heart failure. Symptoms reviewed with family who verbalized the following symptoms: Denies chest pain/pressure, palpitation, or dizzy events. Reports difficult sleep but is not wearing her CPAP. Using walker to ambulate. Denies fever, chills, or flu-like symptoms. Denies any home or med needs. Due to no new or worsening symptoms encounter was not routed to provider for escalation. Education provided regarding infection prevention, and signs and symptoms of COVID-19 and when to seek medical attention with family who verbalized understanding. Discussed exposure protocols and quarantine from [address] you at higher risk for severe illness  and given an opportunity for questions and concerns. The family agrees to contact the COVID-19 hotline [phone] or PCP office for questions related to their healthcare. CTN/ACM provided contact information for future reference. From CDC: Are you at higher risk for severe illness?  Wash your hands often.  Avoid close contact (6 feet, which is about two arm lengths) with people who are sick.  Put distance between yourself and other people if COVID-19 is spreading in your community.  Clean and disinfect frequently touched surfaces.  Avoid all cruise travel and non-essential air travel.  Call your healthcare professional if you have concerns about COVID-19 and your underlying condition or if you are sick.     For more information on steps you can take to protect yourself, see CDC's How to Protect Yourself      CTN s/o.

## 2020-09-22 ENCOUNTER — TELEPHONE (OUTPATIENT)
Dept: ENDOCRINOLOGY | Age: 65
End: 2020-09-22

## 2020-09-29 ENCOUNTER — APPOINTMENT (OUTPATIENT)
Dept: GENERAL RADIOLOGY | Age: 65
DRG: 469 | End: 2020-09-29
Payer: COMMERCIAL

## 2020-09-29 ENCOUNTER — TELEPHONE (OUTPATIENT)
Dept: OTHER | Facility: CLINIC | Age: 65
End: 2020-09-29

## 2020-09-29 ENCOUNTER — HOSPITAL ENCOUNTER (INPATIENT)
Age: 65
LOS: 4 days | Discharge: HOME | DRG: 469 | End: 2020-10-03
Attending: EMERGENCY MEDICINE | Admitting: INTERNAL MEDICINE
Payer: COMMERCIAL

## 2020-09-29 LAB
ALBUMIN SERPL-MCNC: 3.9 G/DL (ref 3.5–4.6)
ALP BLD-CCNC: 115 U/L (ref 40–130)
ALT SERPL-CCNC: 22 U/L (ref 0–33)
ANION GAP SERPL CALCULATED.3IONS-SCNC: 12 MEQ/L (ref 9–15)
ANISOCYTOSIS: ABNORMAL
APTT: 29.9 SEC (ref 24.4–36.8)
AST SERPL-CCNC: 19 U/L (ref 0–35)
BASOPHILS ABSOLUTE: 0.2 K/UL (ref 0–0.2)
BASOPHILS RELATIVE PERCENT: 1.5 %
BILIRUB SERPL-MCNC: 0.4 MG/DL (ref 0.2–0.7)
BUN BLDV-MCNC: 46 MG/DL (ref 8–23)
CALCIUM SERPL-MCNC: 9.7 MG/DL (ref 8.5–9.9)
CHLORIDE BLD-SCNC: 99 MEQ/L (ref 95–107)
CO2: 28 MEQ/L (ref 20–31)
CREAT SERPL-MCNC: 1.3 MG/DL (ref 0.5–0.9)
EOSINOPHILS ABSOLUTE: 0.2 K/UL (ref 0–0.7)
EOSINOPHILS RELATIVE PERCENT: 1.7 %
GFR AFRICAN AMERICAN: 50.1
GFR NON-AFRICAN AMERICAN: 41.4
GLOBULIN: 4 G/DL (ref 2.3–3.5)
GLUCOSE BLD-MCNC: 138 MG/DL (ref 60–115)
GLUCOSE BLD-MCNC: 199 MG/DL (ref 60–115)
GLUCOSE BLD-MCNC: 72 MG/DL (ref 70–99)
HCT VFR BLD CALC: 34.6 % (ref 37–47)
HEMOGLOBIN: 11.7 G/DL (ref 12–16)
INR BLD: 1.1
LYMPHOCYTES ABSOLUTE: 3 K/UL (ref 1–4.8)
LYMPHOCYTES RELATIVE PERCENT: 26 %
MAGNESIUM: 2.1 MG/DL (ref 1.7–2.4)
MCH RBC QN AUTO: 23.7 PG (ref 27–31.3)
MCHC RBC AUTO-ENTMCNC: 33.9 % (ref 33–37)
MCV RBC AUTO: 70.1 FL (ref 82–100)
MICROCYTES: ABNORMAL
MONOCYTES ABSOLUTE: 1 K/UL (ref 0.2–0.8)
MONOCYTES RELATIVE PERCENT: 8.5 %
NEUTROPHILS ABSOLUTE: 7.2 K/UL (ref 1.4–6.5)
NEUTROPHILS RELATIVE PERCENT: 62.3 %
PDW BLD-RTO: 20.6 % (ref 11.5–14.5)
PERFORMED ON: ABNORMAL
PERFORMED ON: ABNORMAL
PLATELET # BLD: 254 K/UL (ref 130–400)
PLATELET SLIDE REVIEW: NORMAL
POIKILOCYTES: ABNORMAL
POLYCHROMASIA: ABNORMAL
POTASSIUM SERPL-SCNC: 4 MEQ/L (ref 3.4–4.9)
PRO-BNP: 5729 PG/ML
PROTHROMBIN TIME: 14.1 SEC (ref 12.3–14.9)
RBC # BLD: 4.94 M/UL (ref 4.2–5.4)
SODIUM BLD-SCNC: 139 MEQ/L (ref 135–144)
TARGET CELLS: ABNORMAL
TOTAL PROTEIN: 7.9 G/DL (ref 6.3–8)
TROPONIN: <0.01 NG/ML (ref 0–0.01)
TSH SERPL DL<=0.05 MIU/L-ACNC: 2.81 UIU/ML (ref 0.44–3.86)
WBC # BLD: 11.6 K/UL (ref 4.8–10.8)

## 2020-09-29 PROCEDURE — 99284 EMERGENCY DEPT VISIT MOD MDM: CPT

## 2020-09-29 PROCEDURE — 6370000000 HC RX 637 (ALT 250 FOR IP): Performed by: INTERNAL MEDICINE

## 2020-09-29 PROCEDURE — 96375 TX/PRO/DX INJ NEW DRUG ADDON: CPT

## 2020-09-29 PROCEDURE — 85025 COMPLETE CBC W/AUTO DIFF WBC: CPT

## 2020-09-29 PROCEDURE — 96376 TX/PRO/DX INJ SAME DRUG ADON: CPT

## 2020-09-29 PROCEDURE — 83880 ASSAY OF NATRIURETIC PEPTIDE: CPT

## 2020-09-29 PROCEDURE — 80053 COMPREHEN METABOLIC PANEL: CPT

## 2020-09-29 PROCEDURE — 2580000003 HC RX 258: Performed by: INTERNAL MEDICINE

## 2020-09-29 PROCEDURE — 36415 COLL VENOUS BLD VENIPUNCTURE: CPT

## 2020-09-29 PROCEDURE — 99283 EMERGENCY DEPT VISIT LOW MDM: CPT

## 2020-09-29 PROCEDURE — 71045 X-RAY EXAM CHEST 1 VIEW: CPT

## 2020-09-29 PROCEDURE — G0378 HOSPITAL OBSERVATION PER HR: HCPCS

## 2020-09-29 PROCEDURE — 6360000002 HC RX W HCPCS: Performed by: EMERGENCY MEDICINE

## 2020-09-29 PROCEDURE — 85610 PROTHROMBIN TIME: CPT

## 2020-09-29 PROCEDURE — 1210000000 HC MED SURG R&B

## 2020-09-29 PROCEDURE — 96374 THER/PROPH/DIAG INJ IV PUSH: CPT

## 2020-09-29 PROCEDURE — 84484 ASSAY OF TROPONIN QUANT: CPT

## 2020-09-29 PROCEDURE — 83735 ASSAY OF MAGNESIUM: CPT

## 2020-09-29 PROCEDURE — 85730 THROMBOPLASTIN TIME PARTIAL: CPT

## 2020-09-29 PROCEDURE — 84443 ASSAY THYROID STIM HORMONE: CPT

## 2020-09-29 RX ORDER — SERTRALINE HYDROCHLORIDE 100 MG/1
200 TABLET, FILM COATED ORAL DAILY
Status: DISCONTINUED | OUTPATIENT
Start: 2020-09-29 | End: 2020-10-03 | Stop reason: HOSPADM

## 2020-09-29 RX ORDER — ISOSORBIDE DINITRATE 10 MG/1
20 TABLET ORAL 3 TIMES DAILY
Status: DISCONTINUED | OUTPATIENT
Start: 2020-09-29 | End: 2020-10-01

## 2020-09-29 RX ORDER — PROMETHAZINE HYDROCHLORIDE 12.5 MG/1
12.5 TABLET ORAL EVERY 6 HOURS PRN
Status: DISCONTINUED | OUTPATIENT
Start: 2020-09-29 | End: 2020-09-29

## 2020-09-29 RX ORDER — DEXTROSE MONOHYDRATE 25 G/50ML
12.5 INJECTION, SOLUTION INTRAVENOUS PRN
Status: DISCONTINUED | OUTPATIENT
Start: 2020-09-29 | End: 2020-10-03 | Stop reason: HOSPADM

## 2020-09-29 RX ORDER — MORPHINE SULFATE 2 MG/ML
4 INJECTION, SOLUTION INTRAMUSCULAR; INTRAVENOUS
Status: DISCONTINUED | OUTPATIENT
Start: 2020-09-29 | End: 2020-09-30

## 2020-09-29 RX ORDER — LEVOTHYROXINE SODIUM 0.05 MG/1
50 TABLET ORAL DAILY
Status: DISCONTINUED | OUTPATIENT
Start: 2020-09-29 | End: 2020-10-03 | Stop reason: HOSPADM

## 2020-09-29 RX ORDER — DEXTROSE MONOHYDRATE 50 MG/ML
100 INJECTION, SOLUTION INTRAVENOUS PRN
Status: DISCONTINUED | OUTPATIENT
Start: 2020-09-29 | End: 2020-10-03 | Stop reason: HOSPADM

## 2020-09-29 RX ORDER — ATORVASTATIN CALCIUM 40 MG/1
40 TABLET, FILM COATED ORAL NIGHTLY
Status: DISCONTINUED | OUTPATIENT
Start: 2020-09-29 | End: 2020-10-03 | Stop reason: HOSPADM

## 2020-09-29 RX ORDER — HALOPERIDOL 5 MG
5 TABLET ORAL DAILY
Status: DISCONTINUED | OUTPATIENT
Start: 2020-09-29 | End: 2020-10-03 | Stop reason: HOSPADM

## 2020-09-29 RX ORDER — ASPIRIN 325 MG
325 TABLET ORAL DAILY
Status: DISCONTINUED | OUTPATIENT
Start: 2020-09-30 | End: 2020-10-03 | Stop reason: HOSPADM

## 2020-09-29 RX ORDER — ACETAMINOPHEN 650 MG/1
650 SUPPOSITORY RECTAL EVERY 6 HOURS PRN
Status: DISCONTINUED | OUTPATIENT
Start: 2020-09-29 | End: 2020-10-03 | Stop reason: HOSPADM

## 2020-09-29 RX ORDER — HYDROXYZINE PAMOATE 25 MG/1
50 CAPSULE ORAL 3 TIMES DAILY PRN
Status: DISCONTINUED | OUTPATIENT
Start: 2020-09-29 | End: 2020-10-01

## 2020-09-29 RX ORDER — POTASSIUM CHLORIDE 20 MEQ/1
40 TABLET, EXTENDED RELEASE ORAL PRN
Status: DISCONTINUED | OUTPATIENT
Start: 2020-09-29 | End: 2020-10-03 | Stop reason: HOSPADM

## 2020-09-29 RX ORDER — NITROGLYCERIN 0.4 MG/1
0.4 TABLET SUBLINGUAL EVERY 5 MIN PRN
Status: DISCONTINUED | OUTPATIENT
Start: 2020-09-29 | End: 2020-10-03 | Stop reason: HOSPADM

## 2020-09-29 RX ORDER — ONDANSETRON 2 MG/ML
4 INJECTION INTRAMUSCULAR; INTRAVENOUS ONCE
Status: COMPLETED | OUTPATIENT
Start: 2020-09-29 | End: 2020-09-29

## 2020-09-29 RX ORDER — SODIUM CHLORIDE 0.9 % (FLUSH) 0.9 %
10 SYRINGE (ML) INJECTION EVERY 12 HOURS SCHEDULED
Status: DISCONTINUED | OUTPATIENT
Start: 2020-09-29 | End: 2020-10-03 | Stop reason: HOSPADM

## 2020-09-29 RX ORDER — HYDRALAZINE HYDROCHLORIDE 50 MG/1
50 TABLET, FILM COATED ORAL EVERY 8 HOURS SCHEDULED
Status: DISCONTINUED | OUTPATIENT
Start: 2020-09-29 | End: 2020-10-03 | Stop reason: HOSPADM

## 2020-09-29 RX ORDER — POTASSIUM CHLORIDE 7.45 MG/ML
10 INJECTION INTRAVENOUS PRN
Status: DISCONTINUED | OUTPATIENT
Start: 2020-09-29 | End: 2020-10-03 | Stop reason: HOSPADM

## 2020-09-29 RX ORDER — METOPROLOL SUCCINATE 50 MG/1
50 TABLET, EXTENDED RELEASE ORAL 2 TIMES DAILY
Status: DISCONTINUED | OUTPATIENT
Start: 2020-09-29 | End: 2020-10-03 | Stop reason: HOSPADM

## 2020-09-29 RX ORDER — SERTRALINE HYDROCHLORIDE 100 MG/1
100 TABLET, FILM COATED ORAL DAILY
COMMUNITY
Start: 2020-09-14

## 2020-09-29 RX ORDER — ONDANSETRON 2 MG/ML
4 INJECTION INTRAMUSCULAR; INTRAVENOUS EVERY 6 HOURS PRN
Status: DISCONTINUED | OUTPATIENT
Start: 2020-09-29 | End: 2020-10-03 | Stop reason: HOSPADM

## 2020-09-29 RX ORDER — FAMOTIDINE 20 MG/1
20 TABLET, FILM COATED ORAL 2 TIMES DAILY
Status: DISCONTINUED | OUTPATIENT
Start: 2020-09-29 | End: 2020-09-30

## 2020-09-29 RX ORDER — NICOTINE POLACRILEX 4 MG
15 LOZENGE BUCCAL PRN
Status: DISCONTINUED | OUTPATIENT
Start: 2020-09-29 | End: 2020-10-03 | Stop reason: HOSPADM

## 2020-09-29 RX ORDER — INSULIN GLARGINE 100 [IU]/ML
30 INJECTION, SOLUTION SUBCUTANEOUS NIGHTLY
Status: DISCONTINUED | OUTPATIENT
Start: 2020-09-29 | End: 2020-10-03 | Stop reason: HOSPADM

## 2020-09-29 RX ORDER — PRAZOSIN HYDROCHLORIDE 2 MG/1
2 CAPSULE ORAL NIGHTLY
Status: ON HOLD | COMMUNITY
Start: 2020-09-14 | End: 2021-01-01

## 2020-09-29 RX ORDER — ACETAMINOPHEN 325 MG/1
650 TABLET ORAL EVERY 6 HOURS PRN
Status: DISCONTINUED | OUTPATIENT
Start: 2020-09-29 | End: 2020-10-03 | Stop reason: HOSPADM

## 2020-09-29 RX ORDER — GABAPENTIN 400 MG/1
400 CAPSULE ORAL 2 TIMES DAILY
Status: DISCONTINUED | OUTPATIENT
Start: 2020-09-29 | End: 2020-10-03 | Stop reason: HOSPADM

## 2020-09-29 RX ORDER — POLYETHYLENE GLYCOL 3350 17 G/17G
17 POWDER, FOR SOLUTION ORAL DAILY PRN
Status: DISCONTINUED | OUTPATIENT
Start: 2020-09-29 | End: 2020-10-03 | Stop reason: HOSPADM

## 2020-09-29 RX ORDER — SODIUM CHLORIDE 0.9 % (FLUSH) 0.9 %
10 SYRINGE (ML) INJECTION PRN
Status: DISCONTINUED | OUTPATIENT
Start: 2020-09-29 | End: 2020-10-03 | Stop reason: HOSPADM

## 2020-09-29 RX ORDER — ONDANSETRON 2 MG/ML
4 INJECTION INTRAMUSCULAR; INTRAVENOUS EVERY 6 HOURS PRN
Status: DISCONTINUED | OUTPATIENT
Start: 2020-09-29 | End: 2020-09-29

## 2020-09-29 RX ADMIN — ONDANSETRON 4 MG: 2 INJECTION INTRAMUSCULAR; INTRAVENOUS at 12:53

## 2020-09-29 RX ADMIN — INSULIN GLARGINE 30 UNITS: 100 INJECTION, SOLUTION SUBCUTANEOUS at 20:52

## 2020-09-29 RX ADMIN — MORPHINE SULFATE 4 MG: 2 INJECTION, SOLUTION INTRAMUSCULAR; INTRAVENOUS at 11:34

## 2020-09-29 RX ADMIN — HYDROMORPHONE HYDROCHLORIDE 1 MG: 1 INJECTION, SOLUTION INTRAMUSCULAR; INTRAVENOUS; SUBCUTANEOUS at 12:53

## 2020-09-29 RX ADMIN — TICAGRELOR 90 MG: 90 TABLET ORAL at 20:53

## 2020-09-29 RX ADMIN — Medication 10 ML: at 20:54

## 2020-09-29 RX ADMIN — HYDRALAZINE HYDROCHLORIDE 50 MG: 50 TABLET, FILM COATED ORAL at 20:55

## 2020-09-29 RX ADMIN — GABAPENTIN 400 MG: 400 CAPSULE ORAL at 20:53

## 2020-09-29 RX ADMIN — FAMOTIDINE 20 MG: 20 TABLET ORAL at 20:53

## 2020-09-29 RX ADMIN — ATORVASTATIN CALCIUM 40 MG: 40 TABLET, FILM COATED ORAL at 20:53

## 2020-09-29 RX ADMIN — ISOSORBIDE DINITRATE 20 MG: 10 TABLET ORAL at 20:53

## 2020-09-29 RX ADMIN — ONDANSETRON 4 MG: 2 INJECTION INTRAMUSCULAR; INTRAVENOUS at 11:34

## 2020-09-29 ASSESSMENT — ENCOUNTER SYMPTOMS
COUGH: 0
SORE THROAT: 0
ABDOMINAL PAIN: 0
VOMITING: 0
SHORTNESS OF BREATH: 0
DIARRHEA: 0
NAUSEA: 0
BACK PAIN: 0

## 2020-09-29 ASSESSMENT — PAIN DESCRIPTION - LOCATION
LOCATION: CHEST
LOCATION: CHEST

## 2020-09-29 ASSESSMENT — PAIN SCALES - GENERAL
PAINLEVEL_OUTOF10: 3
PAINLEVEL_OUTOF10: 8
PAINLEVEL_OUTOF10: 9
PAINLEVEL_OUTOF10: 9

## 2020-09-29 ASSESSMENT — PAIN DESCRIPTION - PAIN TYPE: TYPE: ACUTE PAIN

## 2020-09-29 NOTE — ED PROVIDER NOTES
[address] [facility] ED  eMERGENCYdEPARTMENT eNCOUnter      Pt Name: Payal Menon  MRN: [id number]  [place] 1955  Date of evaluation: 2020  Yonathan Conley MD    CHIEF COMPLAINT           HPI  Payal Menon is a 60 y.o. female per chart review has a h/o CAD s/p, CHF, pulm HTN, DM II, HTN, hpl, PAD, JESICA presents to the ED with chest pain. Pt notes gradual onset, moderate, constant, throbbing, substernal chest pain x 1 week. +Dizziness. Pt denies fever, cough, sob ab pain, dysuria, diarrhea. ROS  Review of Systems   Constitutional: Negative for activity change, chills and fever. HENT: Negative for ear pain and sore throat. Eyes: Negative for visual disturbance. Respiratory: Negative for cough and shortness of breath. Cardiovascular: Positive for chest pain. Negative for palpitations and leg swelling. Gastrointestinal: Negative for abdominal pain, diarrhea, nausea and vomiting. Genitourinary: Negative for dysuria. Musculoskeletal: Negative for back pain. Skin: Negative for rash. Neurological: Positive for dizziness. Negative for weakness. Except as noted above the remainder of the review of systems was reviewed and negative.        PAST MEDICAL HISTORY     Past Medical History:   Diagnosis Date    Asthma     CAD (coronary artery disease)     CKD (chronic kidney disease) stage 3, GFR 30-59 ml/min ([facility])     Colitis     Diabetes mellitus [facility]     Hyperlipidemia     Hypertension     PAD (peripheral artery disease) ([facility])     Prolonged emergence from general anesthesia     PVD (peripheral vascular disease) ([facility]     Thyroid disease          SURGICAL HISTORY       Past Surgical History:   Procedure Laterality Date    CARDIAC SURGERY       SECTION      COLONOSCOPY N/A 2019    COLONOSCOPY DIAGNOSTIC performed by Kateryna Houston MD at [address] [place] [address] GRAFT  2019    unknown vessels    HYSTERECTOMY GLARGINE (LANTUS) 100 UNIT/ML INJECTION VIAL    Inject 30 Units into the skin nightly    INSULIN LISPRO (HUMALOG) 100 UNIT/ML INJECTION VIAL    Inject 0-6 Units into the skin 3 times daily (with meals)    INSULIN LISPRO, 1 UNIT DIAL, (HUMALOG KWIKPEN) 100 UNIT/ML SOPN    10 units at each meals    INSULIN PEN NEEDLE (NOVOFINE) 32G X 6 MM MISC    Qid    INSULIN PEN NEEDLE (NOVOFINE) 32G X 6 MM MISC    qid    INSULIN PEN NEEDLE (NOVOFINE) 32G X 6 MM MISC    qid    IRON POLYSACCH [id number] (NIFEREX-150 FORTE PO)    Take 1 tablet by mouth daily     ISOSORBIDE DINITRATE (ISORDIL) 20 MG TABLET    Take 1 tablet by mouth 3 times daily    LEVOTHYROXINE (SYNTHROID) 50 MCG TABLET    Take 1 tablet by mouth Daily    MAGNESIUM OXIDE (MAG-OX) 400 MG TABLET    Take 1 tablet by mouth daily    MECLIZINE (ANTIVERT) 25 MG TABLET    Take 25 mg by mouth three times daily    METOPROLOL SUCCINATE (TOPROL XL) 50 MG EXTENDED RELEASE TABLET    Take 1 tablet by mouth 2 times daily    MONTELUKAST (SINGULAIR) 10 MG TABLET    take 1 tablet by mouth at bedtime    NITROGLYCERIN (NITROSTAT) 0.4 MG SL TABLET    up to max of 3 total doses. If no relief after 1 dose, call 911. NUTRITIONAL SUPPLEMENTS (GLUCERNA SHAKE) LIQD    take as directed three times a day    OXYGEN    2 lit O2 with sleep , please give O2 concentrator    SERTRALINE (ZOLOFT) 50 MG TABLET    Take 4 tablets by mouth daily    TICAGRELOR (BRILINTA) 90 MG TABS TABLET    Take 90 mg by mouth 2 times daily       ALLERGIES     Ambien [zolpidem tartrate]; Capoten [captopril]; Clioquinol; Cogentin [benztropine]; Depakote [divalproex sodium]; Effexor xr [venlafaxine hcl er]; Geodon [ziprasidone hcl]; Lisinopril; Lyrica [pregabalin]; Navane [thiothixene]; Pamelor [nortriptyline hcl]; Remeron [mirtazapine]; Risperdal [risperidone]; Trazodone and nefazodone; and Wellbutrin [bupropion]    FAMILY HISTORY     History reviewed. No pertinent family history.        SOCIAL HISTORY       Social History O2 [facility] ([facility] pharmacy is [address] [place] [address],[address] [address]., [facility]. Transportation is provided by [employer]	[place] () per the patient         PHYSICAL EXAM       ED Triage Vitals [09/29/20 1116]   BP Temp Temp Source Pulse Resp SpO2 Height Weight   (!) 119/56 96 °F (35.6 °C) Temporal 55 17 96 % -- 239 lb (108.4 kg)       Physical Exam  Vitals signs and nursing note reviewed. Constitutional:       Appearance: She is well-developed. HENT:      Head: Normocephalic. Right Ear: External ear normal.      Left Ear: External ear normal.   Eyes:      Conjunctiva/sclera: Conjunctivae normal.      Pupils: Pupils are equal, round, and reactive to light. Neck:      Musculoskeletal: Normal range of motion and neck supple. Cardiovascular:      Rate and Rhythm: Normal rate and regular rhythm. Heart sounds: Normal heart sounds. Pulmonary:      Effort: Pulmonary effort is normal.      Breath sounds: Normal breath sounds. Abdominal:      General: Bowel sounds are normal. There is no distension. Palpations: Abdomen is soft. Tenderness: There is no abdominal tenderness. Musculoskeletal: Normal range of motion. Comments: Trace edema bilateral lower extremities   Skin:     General: Skin is warm and dry. Neurological:      Mental Status: She is alert and oriented to person, place, and time. Psychiatric:         Mood and Affect: Mood normal.           MDM  59 yo female presents to the ED with chest pain, dizziness. Pt is afebrile, hemodynamically stable. Pt given IV morphine, IV zofran with moderate relief. EKG shows sinus ana maria with HR 54, normal axis, normal intervals, no ST changes. Labs remarkable for BUN 46, Cr 1.3, WBC 12, BNP 5729. CXR negative. Pt reassessed and still has pain. Pt given IV dilaudid, IV zofran with moderate relief. Case discussed with Dr. Silvino Traylor (cardiology) who recommended admission.   Case then discussed with Dr. Rodolfo Cerda who accepted the pt. Pt admitted to medicine in stable condition. FINAL IMPRESSION      1.  Chest pain, unspecified type          DISPOSITION/PLAN   DISPOSITION Decision To Admit 09/29/2020 12:48:12 PM        DISCHARGE MEDICATIONS:  [unfilled]         Juan C Cunningham MD(electronically signed)  Attending Emergency Physician            Juan C Cunningham MD  09/29/20 [id number]

## 2020-09-29 NOTE — ED TRIAGE NOTES
Pt c/o chest pain for the past week, Pt is A&OX3, calm, ambulatory with a walker, afebrile, breathes are equal and unlabored,

## 2020-09-29 NOTE — CARE COORDINATION
[facility] Case Management Initial Discharge Assessment    Met with Patient and spouse to discuss discharge plan. PCP: Morena Sahu                                Date of Last Visit: 9/21/20    If no PCP, list provided? N/A    Discharge Planning    Living Arrangements: independently at home    Who do you live with?     Who helps you with your care:  self    If lives at home:     Do you have any barriers navigating in your home? no    Patient can perform ADL? Yes    Current Services (outpatient and in home) :  None    Dialysis: No    Is transportation available to get to your appointments? Yes    DME Equipment:  [employer], wheel chair    Respiratory equipment: PRN/HS Oxygen she is not sure but may be 2l. Liters    Respiratory provider:  yes - not sure     Pharmacy:  yes - [facility]    Consult with Medication Assistance Program?  No      Does Patient Have a High-Risk for Readmission Diagnosis (CHF, PN, MI, COPD)? No      Initial Discharge Plan? (Note: please see concurrent daily documentation for any updates after initial note). Due to readmission, pt may benefit from [facility]. CM to assess for any further d/c needs.     Electronically signed by Carson Degroot on 9/29/2020 at 2:54 PM

## 2020-09-29 NOTE — CONSULTS
[facility] Cardiology Consult Note        Date of Consult:   2020    Patient:    Lottie Belle    :    1955  CSN:    [id number]    Consulting Cardiologist: Virgil Lakhani MD     Primary Cardiologist: Markie Alexandre MD , [facility]    Requesting Physician:  Beau Farris MD      Reason for Consult:  Chest Pain      Assessment:    1. Chest pain, reproducible, atypical, c/w costochondritis, doubt acute coronary syndrome. 2. Shortness of breath  3. Negative troponin, no acute EKG abnormality, doubt ACS. 4. Congestive heart failure, Diastolic and systolic, history, no apparent decompensation currently. 5. ICM, LVEF 45%  6. MR, 3+  7. CAD, Prior CABG, Status post PCI / stent of the ostial circumflex 20.   8. HTN  9. HLP  10. DM.  11. PHTN  12. Renal insufficiency  13. Anemia  14. PVOD bilateral Legs, Post revascularization [facility] prior. 15. Probable JESICA    Plan:    1. Cardiac Supportive Care  2. Doubt primary cardiovascular problems this admission. Most likely costochondritis recurrence. 3. Primary hospital and pulmonary care as warranted. 4. Continue current medications. 5. Okay to discharge home from cardiovascular standpoint once okay with others. 6. Follow-up with [facility] as scheduled, earlier if need be.  7. Further Recommendations to follow if needed. 8. See Orders        HISTORY OF PRESENT ILLNESS:      Lottie Belle is a pleasant 60 y.o. female with the above past history including multiple admissions for atypical chest pain thought to be costochondritis and known coronary artery disease post coronary bypass surgery  with recent left circumflex angioplasty 2020. She does have ischemic cardiomyopathy ejection fraction 50 to 60% by echocardiogram 2020. She has a history of hypertension hyperlipidemia and diabetes as well as hypothyroidism. She does have peripheral vascular disease post prior right bypass .   She has a history of schizoaffective psychosis. She now re-presents with similar chest discomfort symptoms. These of chest discomfort symptoms are reproducible on palpation as prior admissions. Initial troponins have been negative. Cardiology was asked to see for further evaluation and treatment given her past history. Patient Follows with Helen Monterroso MD.  [facility]. Patient History and Records, EMR reviewed. Patient Interviewed and examined. Fair historian. Broken English.  is present for translation. Other than above she has no other complaints. Denies SOB, LH, Dizziness, TIA or CVA Symptoms. No Orthopnea, Edema or CHF symptoms. No Palpitations. No Syncope. No Fever, Chills or Cold symptoms. No GI,  or Bleeding complaints. Cardiac and general ROS otherwise negative. [address] otherwise negative other than noted. Past Medical History:   Diagnosis Date    Asthma     CAD (coronary artery disease)     CKD (chronic kidney disease) stage 3, G[facility] 30-59 ml[facility]min [facility]     Colitis     [facility]     Hyperlipidemia     Hypertension     [facility]) [facility]     Prolonged emergence from general anesthesia     PVD (peripheral vascular disease) ([facility]     Thyroid disease        Past Surgical History:   Procedure Laterality Date    CARDIAC SURGERY       SECTION      COLONOSCOPY N/A 2019    COLONOSCOPY DIAGNOSTIC performed by Ean Palomino MD at [address] [facility] GRAFT  2019    unknown vessels    HYSTERECTOMY      TOE AMPUTATION Right     3rd toe       Prior to Admission medications    Medication Sig Start Date End Date Taking?  Authorizing Provider   levothyroxine (SYNTHROID) 50 MCG tablet Take 1 tablet by mouth Daily 20   Antonietta Nix MD   insulin lispro, 1 Unit Dial, (HUMALOG KWIKPEN) 100 UNIT/ML SOPN 10 units at each meals 20   Antonietta Nix MD   Alcohol Swabs (ALCOHOL PREP) 70 % PADS qid 20   Aebl Lucila Padilla MD   insulin glargine (LANTUS SOLOSTAR) 100 UNIT/ML injection pen 40 units at bedtime 9/21/20   Jose Roman MD   Continuous Blood Gluc Sensor (FREESTYLE FELY 14 DAY SENSOR) MISC Every 2 weeks 9/21/20   Jose Roman MD   Continuous Blood Gluc  (FREESTYLE FELY 14 DAY READER) CATHLEEN As directed 9/21/20   Jose Roman MD   Insulin Pen Needle (NOVOFINE) 32G X 6 MM MISC qid 9/21/20   Jose Roman MD   montelukast (SINGULAIR) 10 MG tablet take 1 tablet by mouth at bedtime 9/18/20   Kathleen Cabrera MD   furosemide (LASIX) 40 MG tablet Take 1 tablet by mouth daily 9/4/20   Elo Miner MD   magnesium oxide (MAG-OX) 400 MG tablet Take 1 tablet by mouth daily 9/4/20   Elo Miner MD   ticagrelor (BRILINTA) 90 MG TABS tablet Take 90 mg by mouth 2 times daily    Historical Provider, MD   CPAP Machine MISC by Does not apply route New CPAP with 10 cm 8/20/20   Kathleen Cabrera MD   albuterol (PROVENTIL) (2.5 MG/3ML) 0.083% nebulizer solution Take 3 mLs by nebulization every 6 hours as needed for Wheezing 8/20/20 9/19/20  Kathleen Cabrera MD   aspirin 81 MG EC tablet Take 1 tablet by mouth daily 6/30/20   Cee Epperson MD   atorvastatin (LIPITOR) 80 MG tablet Take 1 tablet by mouth nightly 6/29/20   Cee Epperson MD   Insulin Pen Needle (NOVOFINE) 32G X 6 MM MISC qid 6/18/20   Jose Roman MD   OXYGEN 2 lit O2 with sleep , please give O2 concentrator 6/12/20   Kathleen Cabrera MD   Emollient (EUCERIN INTENSIVE REPAIR HAND) 2.5-10 % CREA APPLY TO FEET AT BEDTIME; PLACE SOCKS OVER FEET AFTER APPLICATION IF NEEDED FOR DRY CRACKING FEET 3/15/20   Historical Provider, MD   hydrOXYzine (VISTARIL) 25 MG capsule Take 50 mg by mouth 3 times daily as needed  3/4/20   Historical Provider, MD   Nutritional Supplements ([address]) LIQD take as directed three times a day 6/1/20   Historical Provider, MD   insulin glargine (LANTUS) 100 UNIT/ML injection vial Inject 30 Units into the skin nightly  Patient taking differently: Inject 35 Units into the skin nightly  5/12/20   Erika Tom MD   isosorbide dinitrate (ISORDIL) 20 MG tablet Take 1 tablet by mouth 3 times daily 4/28/20   Emily Cole MD   nitroGLYCERIN (NITROSTAT) 0.4 MG SL tablet up to max of 3 total doses. If no relief after 1 dose, call 911. 4/28/20   Emily Cole MD   hydrALAZINE (APRESOLINE) 50 MG tablet Take 1 tablet by mouth every 8 hours 4/28/20   Emily Cole MD   metoprolol succinate (TOPROL XL) 50 MG extended release tablet Take 1 tablet by mouth 2 times daily 4/28/20   Emily Cole MD   amitriptyline (ELAVIL) 25 MG tablet Take 25 mg by mouth nightly    Historical Provider, MD   albuterol sulfate HFA (VENTOLIN HFA) 108 (90 Base) MCG/ACT inhaler Inhale 2 puffs into the lungs as needed for Wheezing Every 4-6 hours PRN    Historical Provider, MD   acetaminophen (TYLENOL) 500 MG tablet Take 500 mg by mouth 3 times daily Take with Tramadol    Historical Provider, MD   gabapentin (NEURONTIN) 400 MG capsule Take 400 mg by mouth 2 times daily. AM and 800 mg in pm-9pm    Historical Provider, MD   sertraline (ZOLOFT) 50 MG tablet Take 4 tablets by mouth daily 2/12/20   Miryam Richards DO   haloperidol (HALDOL) 5 MG tablet Take 5 mg by mouth daily    Historical Provider, MD   FreeStyle Lancets MISC 1 each by Does not apply route daily 1/30/20   Missy Gongora MD   blood glucose test strips (FREESTYLE LITE) strip 1 each by In Vitro route daily As needed.  1/30/20   Missy Gongora MD   Insulin Pen Needle (NOVOFINE) 32G X 6 MM MISC Qid  Patient taking differently: 1 each 3 times daily Qid 1/30/20   Missy Gongora MD   folic acid (FOLVITE) 1 MG tablet Take 1 mg by mouth daily    Historical Provider, MD   Iron Polysacch [id number] (NIFEREX-150 FORTE PO) Take 1 tablet by mouth daily     Historical Provider, MD   Cyanocobalamin (VITAMIN B-12) 1000 MCG extended release tablet Take 1,000 mcg by mouth daily    Historical Provider, MD   insulin lispro (HUMALOG) 100 UNIT/ML injection vial Inject 0-6 Units into the skin 3 times daily (with meals)  Patient not taking: Reported on 9/21/2020 2/28/19   ESDRAS Bravo - CNS   meclizine (ANTIVERT) 25 MG tablet Take 25 mg by mouth three times daily    Historical Provider, MD       Scheduled Meds:  Continuous Infusions:  PRN Meds:morphine    Allergies   Allergen Reactions    Ambien [Zolpidem Tartrate]     Capoten [Captopril]     Clioquinol     Cogentin [Benztropine]     Depakote [Divalproex Sodium]     Effexor Xr [Venlafaxine Hcl Er]     Geodon [Ziprasidone Hcl]     Lisinopril      Hyperkalemia: 4/21/20 potassium was 6.7    Lyrica [Pregabalin]     Navane [Thiothixene]     Pamelor [Nortriptyline Hcl]     Remeron [Mirtazapine]     Risperdal [Risperidone]     Trazodone And Nefazodone     Wellbutrin [Bupropion]        Social History     Socioeconomic History    Marital status:      Spouse name: Not on file    Number of children: Not on file    Years of education: Not on file    Highest education level: Not on file   Occupational History    Not on file   Social Needs    Financial resource strain: Not on file    Food insecurity     Worry: Not on file     Inability: Not on file   [employer] needs     Medical: Not on file     Non-medical: Not on file   Tobacco Use    Smoking status: Never Smoker    Smokeless tobacco: Never Used   Substance and Sexual Activity    Alcohol use: Never     Frequency: Never    Drug use: Never    Sexual activity: Not on file   Lifestyle    Physical activity     Days per week: Not on file     Minutes per session: Not on file    Stress: Not on file   Relationships    Social connections     Talks on phone: Not on file     Gets together: Not on file     Attends Church service: Not on file     Active member of club or organization: Not on file     Attends meetings of clubs or organizations: Not on file     Relationship status: Not on file    Intimate partner violence     Fear of current or ex partner: Not on file     Emotionally abused: Not on file     Physically abused: Not on file     Forced sexual activity: Not on file   Other Topics Concern    Not on file   Social History Narrative         Lives With: Spouse    Type of Home: [address]: One level    Home Access: Elevator    Bathroom Shower/Tub: Tub/Shower unit(Simultaneous filing. User may not have seen previous data.)    Bathroom Equipment: Grab bars in shower, Shower chair    Home Equipment: Rolling walker, [address] 450 bed    ADL Assistance: Needs assistance    Homemaking Assistance: Needs assistance    Homemaking Responsibilities: No    Ambulation Assistance: Independent    Transfer Assistance: Independent    Active : No    Additional Comments: Pt wears orthopedic shoes at baseline    The patient states she is current with [facility](RN per the ). The patient had a walker, but obtained a hospital bed, wheel chair, BSC and O2 last admission (from [address]). pharmacy is [address] [place] [address],[address] [address]., [facility]. Transportation is provided by [employer] () per the patient       History reviewed. No pertinent family history. Review Of Systems:    14 point ROS negative other than mentioned. Physical Exam:    CURRENT VITALS: BP (!) 127/53   Pulse (!) 48   Temp 96 °F (35.6 °C) (Temporal)   Resp 16   Wt 239 lb (108.4 kg)   SpO2 97%   BMI 41.02 kg/m²     CONSTITUTIONAL:  awake, alert, cooperative, no apparent distress, anxious. ENT:  Normocephalic, without obvious abnormality, atraumatic, sinuses nontender on palpation, external ears without lesions,  NECK:  Supple, symmetrical, trachea midline, no adenopathy, thyroid symmetric, not enlarged and no tenderness, skin normal, No bruits.   LUNGS:  No increased work of breathing, good air exchange, clear to auscultation bilaterally, no crackles, no wheezing  CHEST: Midline scar well-healed. Reproducible left ijeoma-breast discomfort with palpation. CARDIOVASCULAR:  Normal apical impulse, regular rate and rhythm, normal S1 and S2,  1/6 Systolic murmur noted. ABDOMEN:  Obese, normal bowel sounds, soft, non-distended, non-tender, no masses palpated, no hepatosplenomegally  EXTREMETIES: No edema, Pulses Strong Thruout. No ulcers. NEUROLOGIC:  Awake, alert, oriented to name, place and time. Following all commands and moving all extremties.   SKIN:  no bruising or bleeding, normal skin color, texture, turgor and no rashes    Labs:  Recent Results (from the past 24 hour(s))   Comprehensive Metabolic Panel    Collection Time: 09/29/20 11:30 AM   Result Value Ref Range    Sodium 139 135 - 144 mEq/L    Potassium 4.0 3.4 - 4.9 mEq/L    Chloride 99 95 - 107 mEq/L    CO2 28 20 - 31 mEq/L    Anion Gap 12 9 - 15 mEq/L    Glucose 72 70 - 99 mg/dL    BUN 46 (H) 8 - 23 mg/dL    CREATININE 1.30 (H) 0.50 - 0.90 mg/dL    GFR Non-African American 41.4 (L) >60    GFR  50.1 (L) >60    Calcium 9.7 8.5 - 9.9 mg/dL    Total Protein 7.9 6.3 - 8.0 g/dL    Alb 3.9 3.5 - 4.6 g/dL    Total Bilirubin 0.4 0.2 - 0.7 mg/dL    Alkaline Phosphatase 115 40 - 130 U/L    ALT 22 0 - 33 U/L    AST 19 0 - 35 U/L    Globulin 4.0 (H) 2.3 - 3.5 g/dL   CBC Auto Differential    Collection Time: 09/29/20 11:30 AM   Result Value Ref Range    WBC 11.6 (H) 4.8 - 10.8 K/uL    RBC 4.94 4.20 - 5.40 M/uL    Hemoglobin 11.7 (L) 12.0 - 16.0 g/dL    Hematocrit 34.6 (L) 37.0 - 47.0 %    MCV 70.1 (L) 82.0 - 100.0 fL    MCH 23.7 (L) 27.0 - 31.3 pg    MCHC 33.9 33.0 - 37.0 %    RDW 20.6 (H) 11.5 - 14.5 %    Platelets 931 899 - 934 K/uL    PLATELET SLIDE REVIEW Normal     Neutrophils % 62.3 %    Lymphocytes % 26.0 %    Monocytes % 8.5 %    Eosinophils % 1.7 %    Basophils % 1.5 %    Neutrophils Absolute 7.2 (H) 1.4 - 6.5 K/uL    Lymphocytes Absolute 3.0 1.0 - 4.8 K/uL    Monocytes Absolute 1.0 (H) 0.2 - 0.8 K/uL    Eosinophils Absolute 0.2 0.0 - 0.7 K/uL    Basophils Absolute 0.2 0.0 - 0.2 K/uL    Anisocytosis 1+     Microcytes 1+     Polychromasia 1+     Poikilocytes 1+     Target Cells 1+    Magnesium    Collection Time: 09/29/20 11:30 AM   Result Value Ref Range    Magnesium 2.1 1.7 - 2.4 mg/dL   Troponin    Collection Time: 09/29/20 11:30 AM   Result Value Ref Range    Troponin <0.010 0.000 - 0.010 ng/mL   TSH without Reflex    Collection Time: 09/29/20 11:30 AM   Result Value Ref Range    TSH 2.810 0.440 - 3.860 uIU/mL   APTT    Collection Time: 09/29/20 11:30 AM   Result Value Ref Range    aPTT 29.9 24.4 - 36.8 sec   Protime-INR    Collection Time: 09/29/20 11:30 AM   Result Value Ref Range    Protime 14.1 12.3 - 14.9 sec    INR 1.1    Brain Natriuretic Peptide    Collection Time: 09/29/20 11:30 AM   Result Value Ref Range    Pro-BNP 5,729 pg/mL   EKG 12 Lead    Collection Time: 09/29/20 11:30 AM   Result Value Ref Range    Ventricular Rate 54 BPM    Atrial Rate 54 BPM    P-R Interval 158 ms    QRS Duration 108 ms    Q-T Interval 488 ms    QTc Calculation (Bazett) 462 ms    P Axis -64 degrees    R Axis 61 degrees    T Axis -63 degrees       ECG:     Sinus rhythm, poor R wave ventricle pressure nonspecific interventricular conduction delay. ECHO:    September 16, 2020. LVEF 55 to 60%. 1-2+ MR. Mitral annular calcification. RVSP 44.         Lyubov Brown MD  [facility] Cardiologist      Electronically signed on 9/29/20 at 2:54 PM EDT      -----

## 2020-09-29 NOTE — H&P
Hospital Medicine  History and Physical    Patient:  Lucy Sage  MRN: [id number]    CHIEF COMPLAINT:    Chief Complaint   Patient presents with    Chest Pain       History Obtained From:  Patient, EMR  Primary Care Physician: Sari Lopez    HISTORY OF PRESENT ILLNESS:   The patient is a 60 y.o. female with PMHx of CAD, CKD, DMII, HTN, HLD, PVD who presents with pleuritic chest pain. Patient states she has had consistent chest pain for 2 weeks. No relieving or improving factors; not associated with SOB or exertion. It is reproducible with a cough. Denies fevers, chills, sweats, diarrhea or burning micturition. States her legs are slightly more swollen. Past Medical History:      Diagnosis Date    Asthma     CAD (coronary artery disease)     CKD (chronic kidney disease) stage 3, GFR 30-59 ml/min ([facility])     Colitis     Diabetes mellitus ([address]     Hyperlipidemia     Hypertension     PAD (peripheral artery disease) ([facility]     Prolonged emergence from general anesthesia     [facility] vascular [facility]) [facility]     Thyroid disease        Past Surgical History:      Procedure Laterality Date    CARDIAC SURGERY       SECTION      COLONOSCOPY N/A 2019    COLONOSCOPY DIAGNOSTIC performed by Dell Diez MD at [address] [place] [address] GRAFT  2019    unknown vessels    HYSTERECTOMY      TOE AMPUTATION Right     3rd toe       Medications Prior to Admission:    Prior to Admission medications    Medication Sig Start Date End Date Taking?  Authorizing Provider   levothyroxine (SYNTHROID) 50 MCG tablet Take 1 tablet by mouth Daily 20   Aline Grimes MD   insulin lispro, 1 Unit Dial, (HUMALOG KWIKPEN) 100 UNIT/ML SOPN 10 units at each meals 20   Aline Grimes MD   Alcohol Swabs (ALCOHOL PREP) 70 % PADS qid 20   Aline Grimes MD   insulin glargine (LANTUS SOLOSTAR) 100 UNIT/ML injection pen 40 units at bedtime 20   Aline Grimes MD Continuous Blood Gluc Sensor (FREESTYLE FELY 14 DAY SENSOR) MISC Every 2 weeks 9/21/20   Yousif Cook MD   Continuous Blood Gluc  (FREESTYLE FELY 14 DAY READER) CATHLEEN As directed 9/21/20   Yousif Cook MD   Insulin Pen Needle (NOVOFINE) 32G X 6 MM MISC qid 9/21/20   Yousif Cook MD   montelukast (SINGULAIR) 10 MG tablet take 1 tablet by mouth at bedtime 9/18/20   Chula Alcala MD   furosemide (LASIX) 40 MG tablet Take 1 tablet by mouth daily 9/4/20   Aris Cruz MD   magnesium oxide (MAG-OX) 400 MG tablet Take 1 tablet by mouth daily 9/4/20   Aris Cruz MD   ticagrelor (BRILINTA) 90 MG TABS tablet Take 90 mg by mouth 2 times daily    Historical Provider, MD   CPAP Machine MISC by Does not apply route New CPAP with 10 cm 8/20/20   Chula Alcala MD   albuterol (PROVENTIL) (2.5 MG/3ML) 0.083% nebulizer solution Take 3 mLs by nebulization every 6 hours as needed for Wheezing 8/20/20 9/19/20  Chula Alcala MD   aspirin 81 MG EC tablet Take 1 tablet by mouth daily 6/30/20   Ubaldo Flores MD   atorvastatin (LIPITOR) 80 MG tablet Take 1 tablet by mouth nightly 6/29/20   Ubaldo Flores MD   Insulin Pen Needle (NOVOFINE) 32G X 6 MM MISC qid 6/18/20   Yousif Cook MD   OXYGEN 2 lit O2 with sleep , please give O2 concentrator 6/12/20   Chula Alcala MD   Emollient (EUCERIN INTENSIVE REPAIR HAND) 2.5-10 % CREA APPLY TO FEET AT BEDTIME; PLACE SOCKS OVER FEET AFTER APPLICATION IF NEEDED FOR DRY CRACKING FEET 3/15/20   Historical Provider, MD   hydrOXYzine (VISTARIL) 25 MG capsule Take 50 mg by mouth 3 times daily as needed  3/4/20   Historical Provider, MD   Nutritional Supplements ([address]) LIQD take as directed three times a day 6/1/20   Historical Provider, MD   insulin glargine (LANTUS) 100 UNIT/ML injection vial Inject 30 Units into the skin nightly  Patient taking differently: Inject 35 Units into the skin nightly  5/12/20   Trixie Guadalupe MD   isosorbide dinitrate (ISORDIL) 20 MG tablet Take 1 tablet by mouth 3 times daily 4/28/20   Maribell Ramírez MD   nitroGLYCERIN (NITROSTAT) 0.4 MG SL tablet up to max of 3 total doses. If no relief after 1 dose, call 911. 4/28/20   Maribell Ramírez MD   hydrALAZINE (APRESOLINE) 50 MG tablet Take 1 tablet by mouth every 8 hours 4/28/20   Maribell Ramírez MD   metoprolol succinate (TOPROL XL) 50 MG extended release tablet Take 1 tablet by mouth 2 times daily 4/28/20   Maribell Ramírez MD   amitriptyline (ELAVIL) 25 MG tablet Take 25 mg by mouth nightly    Historical Provider, MD   albuterol sulfate HFA (VENTOLIN HFA) 108 (90 Base) MCG/ACT inhaler Inhale 2 puffs into the lungs as needed for Wheezing Every 4-6 hours PRN    Historical Provider, MD   acetaminophen (TYLENOL) 500 MG tablet Take 500 mg by mouth 3 times daily Take with Tramadol    Historical Provider, MD   gabapentin (NEURONTIN) 400 MG capsule Take 400 mg by mouth 2 times daily. AM and 800 mg in pm-9pm    Historical Provider, MD   sertraline (ZOLOFT) 50 MG tablet Take 4 tablets by mouth daily 2/12/20   Ramon Singh DO   haloperidol (HALDOL) 5 MG tablet Take 5 mg by mouth daily    Historical Provider, MD   FreeStyle Lancets MISC 1 each by Does not apply route daily 1/30/20   Tana Souza MD   blood glucose test strips (FREESTYLE LITE) strip 1 each by In Vitro route daily As needed.  1/30/20   Tana Souza MD   Insulin Pen Needle (NOVOFINE) 32G X 6 MM MISC Qid  Patient taking differently: 1 each 3 times daily Qid 1/30/20   Tana Souza MD   folic acid (FOLVITE) 1 MG tablet Take 1 mg by mouth daily    Historical Provider, MD   Iron Polysacch [id number] (NIFEREX-150 FORTE PO) Take 1 tablet by mouth daily     Historical Provider, MD   Cyanocobalamin (VITAMIN B-12) 1000 MCG extended release tablet Take 1,000 mcg by mouth daily    Historical Provider, MD   insulin lispro (HUMALOG) 100 UNIT/ML injection vial Inject 0-6 Units into the skin 3 times daily (with meals)  Patient not taking: Reported on 9/21/2020 2/28/19   ESDRAS Hogan - CNS   meclizine (ANTIVERT) 25 MG tablet Take 25 mg by mouth three times daily    Historical Provider, MD       Allergies:  Ambien [zolpidem tartrate]; Capoten [captopril]; Clioquinol; Cogentin [benztropine]; Depakote [divalproex sodium]; Effexor xr [venlafaxine hcl er]; Geodon [ziprasidone hcl]; Lisinopril; Lyrica [pregabalin]; Navane [thiothixene]; Pamelor [nortriptyline hcl]; Remeron [mirtazapine]; Risperdal [risperidone]; Trazodone and nefazodone; and Wellbutrin [bupropion]    Social History:   TOBACCO:   reports that she has never smoked. She has never used smokeless tobacco.  ETOH:   reports no history of alcohol use. Family History:   History reviewed. No pertinent family history. REVIEW OF SYSTEMS:  Ten systems reviewed and negative except for stated in HPI    Physical Exam:    Vitals: BP (!) 127/53   Pulse (!) 48   Temp 96 °F (35.6 °C) (Temporal)   Resp 16   Wt 239 lb (108.4 kg)   SpO2 97%   BMI 41.02 kg/m²   General appearance: alert, appears stated age and cooperative  Skin:  No rashes or lesions on exposed skin  HEENT: Head: Normal, normocephalic, atraumatic. [place]    Neck: supple, symmetrical, trachea midline  Lungs: clear to auscultation bilaterally  Heart: regular rate and rhythm  Abdomen: soft, non-tender; bowel sounds normal; no masses,  no organomegaly  Extremities: +1 edema  Neurologic: Non focal    Recent Labs     09/29/20  1130   WBC 11.6*   HGB 11.7*        Recent Labs     09/29/20  1130      K 4.0   CL 99   CO2 28   BUN 46*   CREATININE 1.30*   GLUCOSE 72   AST 19   ALT 22   BILITOT 0.4   ALKPHOS 115     Troponin T:   Recent Labs     09/29/20  1130   TROPONINI <0.010       ABGs: No results found for: PHART, PO2ART, TKS1VRA  INR:   Recent Labs     09/29/20  1130   INR 1.1     URINALYSIS:No results for input(s): NITRITE, COLORU, PHUR, LABCAST, WBCUA, RBCUA, MUCUS, TRICHOMONAS, YEAST, BACTERIA, CLARITYU, SPECGRAV, LEUKOCYTESUR, UROBILINOGEN, BILIRUBINUR, BLOODU, GLUCOSEU, AMORPHOUS in the last 72 hours. Invalid input(s): [place]  -----------------------------------------------------------------   Xr Chest Portable    Result Date: 9/29/2020  Exam: XR CHEST PORTABLE History:  Chest pain Technique: AP portable view of the chest obtained. Comparison: Chest x-ray from June 29, 2020   Findings: There is no change in the median sternotomy. The cardiac silhouette is at the upper limits of normal in size. There are no infiltrates, consolidations or effusions. Bones of the thorax appear intact. No radiographic evidence of acute intrathoracic process. Assessment and Plan   1. Pleuritic chest pain:  Reproducible with coughing; not with exertion. Cardiology requested admission fro chest pain rule out; will trend troponin; resume home meds  1. HTN/HLD/CAD: Continue home meds   2. DMII:  Home meds  3. Hypothyroidism:  Continue home meds  2. Functional Status: Fall precautions. Up with assistance. PT OT  3. Diet: Diabetic   4. DVT ppx: Lovenox SCDs  5. Disposition: Dependent on hospital course. Will discharge once medically stable. SW on board for discharge planning.      Patient Active Problem List   Diagnosis Code    Severe major depression with psychotic features ([facility] F32.3    Type 2 diabetes mellitus with hyperglycemia, with long-term current use of insulin ([facility]) E11.65, Z79.4    HTN (hypertension) I10    HLD (hyperlipidemia) E78.5    Hypothyroid E03.9    HF (heart failure) ([facility] I50.9    Non-pressure ulcer of toe ([facility] L97.509    Atherosclerotic PVD with intermittent claudication ([facility] I70.219    Acute osteomyelitis ([facility] M86.10    Infection of skin L08.9    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 A49.8    Surgical wound dehiscence, initial encounter T81.31XA    S/P amputation of lesser toe, right ([facility]) Z89.421    Rectal bleeding K62.5    Athscl native arteries of left leg w ulceration oth prt foot ([facility] I70.245    JESICA (obstructive sleep apnea) G47.33    Secondary diabetes mellitus ([facility]) E13.9    Chest pain R07.9    Peripheral vascular disease ([facility]) I73.9    Cellulitis L03.90    Syncope and collapse R55    Severe mitral regurgitation I34.0    Ischemic myocardial dysfunction I25.5    Pulmonary hypertension ([facility]) I27.20    Acute on chronic combined systolic and diastolic congestive heart failure ([facility]) I50.43    Nocturnal hypoxia G47.34    RADHA (acute kidney injury) ([facility]) N17.9    Dizziness R42    Acute cerebrovascular accident ([facility] I63.9    Abnormal gait R26.9    Anxiety F41.9    Gastritis, unspecified, without bleeding K29.70    Pure hypercholesterolemia E78.00    Schizophrenia, unspecified ([facility]) F20.9    CAD in native artery I25.10    Extrapyramidal disease G25.9    Gangrene of toe of left foot ([facility]) I96    Drug-induced hypotension I95.2    Osteomyelitis of right foot ([facility]) M86.9    Nausea and vomiting R11.2    Mild intermittent asthma J45.20    Heart failure, diastolic, with acute decompensation ([facility]) I50.33    Major depressive disorder, single episode, unspecified F32.9    Type 2 diabetes mellitus with diabetic neuropathy, unspecified ([facility] E11.40       Yong Miles MD  Admitting Hospitalist    Emergency Contact:

## 2020-09-30 LAB
ALBUMIN SERPL-MCNC: 3.4 G/DL (ref 3.5–4.6)
ALP BLD-CCNC: 117 U/L (ref 40–130)
ALT SERPL-CCNC: 27 U/L (ref 0–33)
ANION GAP SERPL CALCULATED.3IONS-SCNC: 10 MEQ/L (ref 9–15)
AST SERPL-CCNC: 28 U/L (ref 0–35)
BILIRUB SERPL-MCNC: 0.5 MG/DL (ref 0.2–0.7)
BUN BLDV-MCNC: 59 MG/DL (ref 8–23)
C-REACTIVE PROTEIN, HIGH SENSITIVITY: 31.4 MG/L (ref 0–5)
CALCIUM SERPL-MCNC: 9.2 MG/DL (ref 8.5–9.9)
CHLORIDE BLD-SCNC: 98 MEQ/L (ref 95–107)
CHOLESTEROL, TOTAL: 139 MG/DL (ref 0–199)
CO2: 26 MEQ/L (ref 20–31)
CREAT SERPL-MCNC: 1.64 MG/DL (ref 0.5–0.9)
CREATININE URINE: 111 MG/DL
EKG ATRIAL RATE: 54 BPM
EKG ATRIAL RATE: 56 BPM
EKG P AXIS: -64 DEGREES
EKG P AXIS: 9 DEGREES
EKG P-R INTERVAL: 158 MS
EKG P-R INTERVAL: 166 MS
EKG Q-T INTERVAL: 462 MS
EKG Q-T INTERVAL: 488 MS
EKG QRS DURATION: 100 MS
EKG QRS DURATION: 108 MS
EKG QTC CALCULATION (BAZETT): 445 MS
EKG QTC CALCULATION (BAZETT): 462 MS
EKG R AXIS: 250 DEGREES
EKG R AXIS: 61 DEGREES
EKG T AXIS: -63 DEGREES
EKG T AXIS: 80 DEGREES
EKG VENTRICULAR RATE: 54 BPM
EKG VENTRICULAR RATE: 56 BPM
GFR AFRICAN AMERICAN: 38.3
GFR NON-AFRICAN AMERICAN: 31.7
GLOBULIN: 3.7 G/DL (ref 2.3–3.5)
GLUCOSE BLD-MCNC: 152 MG/DL (ref 70–99)
GLUCOSE BLD-MCNC: 154 MG/DL (ref 60–115)
GLUCOSE BLD-MCNC: 161 MG/DL (ref 60–115)
GLUCOSE BLD-MCNC: 164 MG/DL (ref 60–115)
GLUCOSE BLD-MCNC: 231 MG/DL (ref 60–115)
GLUCOSE BLD-MCNC: 263 MG/DL (ref 60–115)
HCT VFR BLD CALC: 32.3 % (ref 37–47)
HDLC SERPL-MCNC: 40 MG/DL (ref 40–59)
HEMOGLOBIN: 10.6 G/DL (ref 12–16)
LDL CHOLESTEROL CALCULATED: 85 MG/DL (ref 0–129)
MAGNESIUM: 2.5 MG/DL (ref 1.7–2.4)
MCH RBC QN AUTO: 23.5 PG (ref 27–31.3)
MCHC RBC AUTO-ENTMCNC: 32.7 % (ref 33–37)
MCV RBC AUTO: 71.8 FL (ref 82–100)
MICROALBUMIN UR-MCNC: 3.8 MG/DL
MICROALBUMIN/CREAT UR-RTO: 34.2 MG/G (ref 0–30)
PDW BLD-RTO: 20.8 % (ref 11.5–14.5)
PERFORMED ON: ABNORMAL
PLATELET # BLD: 227 K/UL (ref 130–400)
POTASSIUM SERPL-SCNC: 5.4 MEQ/L (ref 3.4–4.9)
RBC # BLD: 4.51 M/UL (ref 4.2–5.4)
SEDIMENTATION RATE, ERYTHROCYTE: 52 MM (ref 0–30)
SODIUM BLD-SCNC: 134 MEQ/L (ref 135–144)
TOTAL PROTEIN: 7.1 G/DL (ref 6.3–8)
TRIGL SERPL-MCNC: 69 MG/DL (ref 0–150)
TROPONIN: <0.01 NG/ML (ref 0–0.01)
WBC # BLD: 11.4 K/UL (ref 4.8–10.8)

## 2020-09-30 PROCEDURE — 83735 ASSAY OF MAGNESIUM: CPT

## 2020-09-30 PROCEDURE — 6360000002 HC RX W HCPCS: Performed by: INTERNAL MEDICINE

## 2020-09-30 PROCEDURE — 85652 RBC SED RATE AUTOMATED: CPT

## 2020-09-30 PROCEDURE — 85027 COMPLETE CBC AUTOMATED: CPT

## 2020-09-30 PROCEDURE — 80053 COMPREHEN METABOLIC PANEL: CPT

## 2020-09-30 PROCEDURE — 82570 ASSAY OF URINE CREATININE: CPT

## 2020-09-30 PROCEDURE — 2580000003 HC RX 258: Performed by: INTERNAL MEDICINE

## 2020-09-30 PROCEDURE — 86141 C-REACTIVE PROTEIN HS: CPT

## 2020-09-30 PROCEDURE — 80061 LIPID PANEL: CPT

## 2020-09-30 PROCEDURE — 93005 ELECTROCARDIOGRAM TRACING: CPT | Performed by: INTERNAL MEDICINE

## 2020-09-30 PROCEDURE — 36415 COLL VENOUS BLD VENIPUNCTURE: CPT

## 2020-09-30 PROCEDURE — 82043 UR ALBUMIN QUANTITATIVE: CPT

## 2020-09-30 PROCEDURE — 84484 ASSAY OF TROPONIN QUANT: CPT

## 2020-09-30 PROCEDURE — 6370000000 HC RX 637 (ALT 250 FOR IP): Performed by: INTERNAL MEDICINE

## 2020-09-30 PROCEDURE — 2060000000 HC ICU INTERMEDIATE R&B

## 2020-09-30 PROCEDURE — 87205 SMEAR GRAM STAIN: CPT

## 2020-09-30 RX ORDER — SERTRALINE HYDROCHLORIDE 100 MG/1
200 TABLET, FILM COATED ORAL DAILY
Status: DISCONTINUED | OUTPATIENT
Start: 2020-09-30 | End: 2020-09-30

## 2020-09-30 RX ORDER — PRAZOSIN HYDROCHLORIDE 2 MG/1
2 CAPSULE ORAL NIGHTLY
Status: DISCONTINUED | OUTPATIENT
Start: 2020-09-30 | End: 2020-10-03 | Stop reason: HOSPADM

## 2020-09-30 RX ORDER — FOLIC ACID 1 MG/1
1 TABLET ORAL DAILY
Status: DISCONTINUED | OUTPATIENT
Start: 2020-09-30 | End: 2020-10-03 | Stop reason: HOSPADM

## 2020-09-30 RX ORDER — FAMOTIDINE 20 MG/1
20 TABLET, FILM COATED ORAL DAILY
Status: DISCONTINUED | OUTPATIENT
Start: 2020-10-01 | End: 2020-10-03 | Stop reason: HOSPADM

## 2020-09-30 RX ORDER — AMITRIPTYLINE HYDROCHLORIDE 25 MG/1
25 TABLET, FILM COATED ORAL NIGHTLY
Status: DISCONTINUED | OUTPATIENT
Start: 2020-09-30 | End: 2020-10-03 | Stop reason: HOSPADM

## 2020-09-30 RX ORDER — DOCUSATE SODIUM 100 MG/1
100 CAPSULE, LIQUID FILLED ORAL 2 TIMES DAILY
Status: DISCONTINUED | OUTPATIENT
Start: 2020-09-30 | End: 2020-10-03 | Stop reason: HOSPADM

## 2020-09-30 RX ORDER — SENNA PLUS 8.6 MG/1
2 TABLET ORAL NIGHTLY
Status: DISCONTINUED | OUTPATIENT
Start: 2020-09-30 | End: 2020-10-03 | Stop reason: HOSPADM

## 2020-09-30 RX ADMIN — Medication 17.2 MG: at 20:29

## 2020-09-30 RX ADMIN — GABAPENTIN 400 MG: 400 CAPSULE ORAL at 08:52

## 2020-09-30 RX ADMIN — ACETAMINOPHEN 650 MG: 325 TABLET, FILM COATED ORAL at 12:18

## 2020-09-30 RX ADMIN — ASPIRIN 325 MG: 325 TABLET, FILM COATED ORAL at 08:52

## 2020-09-30 RX ADMIN — FOLIC ACID 1 MG: 1 TABLET ORAL at 20:32

## 2020-09-30 RX ADMIN — INSULIN GLARGINE 30 UNITS: 100 INJECTION, SOLUTION SUBCUTANEOUS at 20:59

## 2020-09-30 RX ADMIN — GABAPENTIN 400 MG: 400 CAPSULE ORAL at 20:29

## 2020-09-30 RX ADMIN — TICAGRELOR 90 MG: 90 TABLET ORAL at 20:29

## 2020-09-30 RX ADMIN — Medication 10 ML: at 20:29

## 2020-09-30 RX ADMIN — HYDRALAZINE HYDROCHLORIDE 50 MG: 50 TABLET, FILM COATED ORAL at 06:00

## 2020-09-30 RX ADMIN — TICAGRELOR 90 MG: 90 TABLET ORAL at 08:52

## 2020-09-30 RX ADMIN — HALOPERIDOL 5 MG: 5 TABLET ORAL at 08:52

## 2020-09-30 RX ADMIN — AMITRIPTYLINE HYDROCHLORIDE 25 MG: 25 TABLET, FILM COATED ORAL at 20:29

## 2020-09-30 RX ADMIN — ISOSORBIDE DINITRATE 20 MG: 10 TABLET ORAL at 08:52

## 2020-09-30 RX ADMIN — Medication 10 ML: at 08:52

## 2020-09-30 RX ADMIN — PRAZOSIN HYDROCHLORIDE 2 MG: 2 CAPSULE ORAL at 20:29

## 2020-09-30 RX ADMIN — ONDANSETRON 4 MG: 2 INJECTION INTRAMUSCULAR; INTRAVENOUS at 14:51

## 2020-09-30 RX ADMIN — FAMOTIDINE 20 MG: 20 TABLET ORAL at 08:52

## 2020-09-30 RX ADMIN — NITROGLYCERIN 1 INCH: 20 OINTMENT TOPICAL at 12:18

## 2020-09-30 RX ADMIN — ENOXAPARIN SODIUM 40 MG: 40 INJECTION SUBCUTANEOUS at 08:52

## 2020-09-30 RX ADMIN — LEVOTHYROXINE SODIUM 50 MCG: 0.05 TABLET ORAL at 06:00

## 2020-09-30 RX ADMIN — ISOSORBIDE DINITRATE 20 MG: 10 TABLET ORAL at 20:29

## 2020-09-30 RX ADMIN — ATORVASTATIN CALCIUM 40 MG: 40 TABLET, FILM COATED ORAL at 20:29

## 2020-09-30 RX ADMIN — DOCUSATE SODIUM 100 MG: 100 CAPSULE, LIQUID FILLED ORAL at 20:29

## 2020-09-30 RX ADMIN — SERTRALINE 200 MG: 100 TABLET, FILM COATED ORAL at 08:52

## 2020-09-30 ASSESSMENT — PAIN SCALES - GENERAL
PAINLEVEL_OUTOF10: 2
PAINLEVEL_OUTOF10: 4

## 2020-09-30 NOTE — CARE COORDINATION
Met with patient. Video  Catheryn Leventhal #[id number] utilized. [facility] [place] Case Management Initial Discharge Assessment    Met with Patient to discuss discharge plan. PCP: Shankar Hein                                Date of Last Visit: \"about a mth ago\"    If no PCP, list provided? N/A    Discharge Planning    Living Arrangements: independently at home    Who do you live with?     Who helps you with your care:  self    If lives at home:     Do you have any barriers navigating in your home? no    Patient can perform ADL? Yes    Current Services (outpatient and in home) :  None    Dialysis: No    Is transportation available to get to your appointments? Yes    DME Equipment:  yes - cane, bsc, w/c. Requests an elevated toilet seat. Respiratory equipment: CPAP with 2 Liters of O2, Pt states only at HS and can't remember if it is a cpap or bipap. Respiratory provider:  yes - unsure     Pharmacy:  yes - [facility]    Consult with Medication Assistance Program?  No      [facility] Declined    Patient agreeable to SNF/Rehab? Declined    Other discharge needs identified? N/A    Freedom of choice list provided with basic dialogue that supports the patient's individualized plan of care/goals and shares the quality data associated with the providers. Yes    Does Patient Have a High-Risk for Readmission Diagnosis (CHF, PN, MI, COPD)? Yes    If Yes,     Consult with pulmonologist? No   Consult with cardiologist? Yes   Cardiac Rehab referral if EF <35%? No   Consult with Pharmacy for medication assessment prior to discharge? No   Consult with Behavioral health to aid in depression, anxiety, or coping issues? No   Palliative Care Consult? No   Pulmonary Rehab order for COPD, PN, and CHF (if EF > 35%)? No    Does patient have a reliable scale and know how to read it (for CHF)? unknown*   Nutrition consult for CHF?  Yes   Respiratory therapy consult that includes bedside instruction on administration of nebulizers and/or inhalers, and assessment of oxygen and equipment needs in the home? Yes    The plan for Transition of Care is related to the following treatment goals:medical clearance    Initial Discharge Plan? (Note: please see concurrent daily documentation for any updates after initial note).     Home w/spouse    The Patient and/or patient representative: patient was provided with choice of any post-acute providers for care and equipment and agrees with discharge plan  Yes    Electronically signed by Aakash Ramirez RN on 9/30/2020 at 11:30 AM

## 2020-09-30 NOTE — PROGRESS NOTES
Hospitalist Progress Note      PCP: Lela Carroll    Date of Admission: 9/29/2020    Chief Complaint:    Chief Complaint   Patient presents with    Chest Pain     Subjective:  Patient no longer is complaining of chest pain but of nausea  And generalized weakness. 12 point ROS negative other than mentioned above     Medications:  Reviewed    Infusion Medications    dextrose       Scheduled Medications    [START ON 10/1/2020] famotidine  20 mg Oral Daily    senna  2 tablet Oral Nightly    docusate sodium  100 mg Oral BID    sodium chloride flush  10 mL Intravenous 2 times per day    atorvastatin  40 mg Oral Nightly    aspirin  325 mg Oral Daily    enoxaparin  40 mg Subcutaneous Daily    nitroglycerin  1 inch Topical 4 times per day    gabapentin  400 mg Oral BID    haloperidol  5 mg Oral Daily    hydrALAZINE  50 mg Oral 3 times per day    insulin glargine  30 Units Subcutaneous Nightly    isosorbide dinitrate  20 mg Oral TID    levothyroxine  50 mcg Oral Daily    metoprolol succinate  50 mg Oral BID    sertraline  200 mg Oral Daily    ticagrelor  90 mg Oral BID    insulin lispro  0-6 Units Subcutaneous TID WC    insulin lispro  0-3 Units Subcutaneous Nightly     PRN Meds: morphine, sodium chloride flush, potassium chloride **OR** potassium alternative oral replacement **OR** potassium chloride, acetaminophen **OR** acetaminophen, polyethylene glycol, nitroGLYCERIN, hydrOXYzine, glucose, dextrose, glucagon (rDNA), dextrose, ondansetron      Intake/Output Summary (Last 24 hours) at 9/30/2020 1714  Last data filed at 9/29/2020 2146  Gross per 24 hour   Intake 120 ml   Output --   Net 120 ml     Exam:    BP (!) 101/36   Pulse 51   Temp 97.2 °F (36.2 °C) (Oral)   Resp 20   Ht 5' 4\" (1.626 m)   Wt 242 lb 14.4 oz (110.2 kg)   SpO2 96%   BMI 41.69 kg/m²     General appearance: No apparent distress, appears stated age and cooperative. HEENT: Conjunctivae/corneas clear.   Neck:  Trachea midline. Respiratory:  Normal respiratory effort. Clear to auscultation  Cardiovascular: Regular rate and rhythm   Abdomen: Soft, non-tender, non-distended with normal bowel sounds. Musculoskeletal: No clubbing, cyanosis or edema bilaterally. Neuro: Non Focal.   Capillary Refill: Brisk,< 3 seconds   Peripheral Pulses: +2 palpable, equal bilaterally     Labs:   Recent Labs     09/29/20  1130 09/30/20  0546   WBC 11.6* 11.4*   HGB 11.7* 10.6*   HCT 34.6* 32.3*    227     Recent Labs     09/29/20  1130 09/30/20  0546    134*   K 4.0 5.4*   CL 99 98   CO2 28 26   BUN 46* 59*   CREATININE 1.30* 1.64*   CALCIUM 9.7 9.2     Recent Labs     09/29/20  1130 09/30/20  0546   AST 19 28   ALT 22 27   BILITOT 0.4 0.5   ALKPHOS 115 117     Recent Labs     09/29/20  1130   INR 1.1     Recent Labs     09/29/20  1530 09/29/20  2312 09/30/20  0546   TROPONINI <0.010 <0.010 <0.010     Urinalysis:      Lab Results   Component Value Date    NITRU Negative 04/21/2020    BLOODU neg 08/31/2020    BLOODU Negative 04/21/2020    SPECGRAV 1.020 08/31/2020    SPECGRAV 1.015 04/21/2020    GLUCOSEU neg 08/31/2020    GLUCOSEU 250 04/21/2020       Radiology:  XR CHEST PORTABLE   Final Result   No radiographic evidence of acute intrathoracic process. Assessment/Plan:    #chest pain     - non cardiac    #RADHA on CKD    - Given worsenign trend Nephrology was consulted; will need this trending in the right direction prior to discharge and given that will need more than 2 midnights during this admission    - Management per nephrology    #[place]    - no focal deficits; PT/OT    #HTN/HLD/CAD: Continue home meds   #DMII:  Home meds  #Hypothyroidism:  Continue home meds    Active Hospital Problems    Diagnosis Date Noted    Chest pain [R07.9] 02/11/2020      Additional work up or/and treatment plan may be added today or then after based on clinical progression. I am managing a portion of pt care.  Some medical issues are handled by other specialists. Additional work up and treatment should be done in out pt setting by pt PCP and other out pt providers. In addition to examining and evaluating pt, I spent additional time explaining care, normal and abnormal findings, and treatment plan. All of pt questions were answered. Counseling, diet and education were  provided. Case will be discussed with nursing staff when appropriate. Family will be updated if and when appropriate.       Diet: DIET CARDIAC; No Caffeine    Code Status: Full Code      Electronically signed by Edin Chao MD on 9/30/2020 at 5:14 PM

## 2020-09-30 NOTE — CONSULTS
[address]                      [facility], [address] [facility]                                  CONSULTATION    PATIENT NAME: Rock Diaz              :        1955  MED REC NO:   [id number]                            ROOM:       W194  ACCOUNT NO:   [de-identified]                           ADMIT DATE: 2020  PROVIDER:     Shalini Pond DO    CONSULT DATE:  2020    RENAL CONSULT    HISTORY OF PRESENT ILLNESS:  This is a 22-year-old Arabic obese female  being seen at the request of hospitalist due to change in renal  function. The patient has had multiple admissions likely for shortness  of breath. On 2020, the patient had a serum creatinine of 1 with  a GFR of 54 mL per minute. The patient is a difficult historian due to  her language barrier. She has been having some chest discomfort  seemingly reproducible with palpation. No significant shortness of  breath and she is not wearing oxygen at the time of my evaluation while  lying flat. The patient denies any fever or chills. She denies any  headaches, stomach pain, or swelling of her legs. She has diabetes in  addition to hypertension and known coronary artery disease. The patient  was told I am a kidney doctor. She denies any blood in the urine or  burning when urination. PAST MEDICAL HISTORY:  Would be diabetes mellitus type 2, CKD III,  peripheral vascular disease, organic heart disease, hypertension, and  history of heart failure. HABITS:  No smoking. No alcohol use. No opioids or nonsteroidals. MEDICATIONS:  At the time of her admission to the hospital; Minipress,  Zoloft, Synthroid, Humalog coverage, Singulair, Lasix, Brilinta,  Lipitor, aspirin, hydralazine, Imdur, Toprol, Ventolin inhaler, Elavil,  Haldol, Antivert, and iron supplement. ALLERGIES TO MEDICATIONS:  Would be none. PHYSICAL EXAMINATION:  VITAL SIGNS:  5 feet 4 inches and 242 pounds.   Blood pressure 110/40,  heart rate 57, respirations 18, and afebrile. HEENT:  Normocephalic. Pupils equal and react to light. Extraocular  muscles intact. NECK:  Supple. No JVD or adenopathy. CHEST:  The lungs are clear. No wheezing. No accessory muscle use. Chest wall shows tenderness in the sternal area upon palpation. CARDIOVASCULAR:  Heart is regular 1/6 systolic murmur. ABDOMEN:  Obese. No guarding or rigidity. EXTREMITIES:  Showed no edema. SKIN:  Warm and dry. IMPRESSION:  1.  CKD III with RADHA secondary to prerenal azotemia. 2.  Organic heart disease with right coronary artery disease and history  of heart failure. 3.  Diabetes mellitus type 2.  4.  History of depression. 5.  Peripheral vascular disease with amputation of the toes. PLAN:  Telemetry. Continue home medications. Evaluate chest  discomfort, suspect musculoskeletal, although history of underlying  coronary disease. Obtain urine for albumin and creatinine ratio. Avoid  nephrotoxic exposure. Increase activity. Follow I and Os. Daily  weight and labs.         Maureen Martinez DO    D: 09/30/2020 8:51:41       T: 09/30/2020 10:06:36     [id number]  Job#: [id number]     Doc#: [id number]    CC:

## 2020-09-30 NOTE — CONSULTS
Renal consult dictated    RADHA with CKD-3 (DM/Hypertensive) pre-renal  OHDx Hx CHF 40-45% EF  hyopkinesis lateral wall  DM type-2  Chest wall pain  PAD Obesity  Schizophrenia  Hypertension controlled    Plan follow labs  I&O  Bmp daily  Avoid nephrotoxic exposure   Urine albumin/Cr

## 2020-09-30 NOTE — PROGRESS NOTES
[id number]  "[employer] Cardiology Progress Note        Date:   2020    Patient:    Som Calix    :    1955  CSN:    [id number]    Rounding Cardiologist: Bernard Blandon MD     Primary Cardiologist: Christel Robles MD , [id number]  [facility]    Requesting Physician:  Milagros Garcia MD      Reason for Initial Consult:  Chest Pain      Subjective:    No changes overall. 14 system General and Cardiac ROS otherwise negative or unchanged. Assessment:    1. Chest pain, reproducible, atypical, c/w costochondritis, doubt acute coronary syndrome. 2. Shortness of breath  3. Negative troponin, no acute EKG abnormality, doubt ACS. 4. Congestive heart failure, Diastolic and systolic, history, no apparent decompensation currently. 5. ICM, LVEF 45%  6. MR, 3+  7. CAD, Prior CABG, Status post PCI / stent of the ostial circumflex 20.   8. HTN  9. HLP  10. DM.  11. PHTN  12. Renal insufficiency  13. Anemia  14. PVOD bilateral Legs, Post revascularization [facility] prior. 15. Probable JESICA    Plan:    1. Cardiac Supportive Care  2. Doubt primary cardiovascular problems this admission. Most likely costochondritis recurrence. 3. Primary hospital and pulmonary care as warranted. 4. Continue current medications. 5. Okay to discharge home from cardiovascular standpoint once okay with others. 6. Follow-up with [facility] as scheduled, earlier if need be.  7. Further Recommendations to follow if needed. 8. See Orders        HISTORY OF PRESENT ILLNESS:      Som Calix is a pleasant 60 y.o. female with the above past history including multiple admissions for atypical chest pain thought to be costochondritis and known coronary artery disease post coronary bypass surgery  with recent left circumflex angioplasty 2020. She does have ischemic cardiomyopathy ejection fraction 50 to 60% by echocardiogram 2020.   She has a history of hypertension hyperlipidemia and diabetes as well as hypothyroidism. She does have peripheral vascular disease post prior right bypass . She has a history of schizoaffective psychosis. She now re-presents with similar chest discomfort symptoms. These of chest discomfort symptoms are reproducible on palpation as prior admissions. Initial troponins have been negative. Cardiology was asked to see for further evaluation and treatment given her past history. Patient Follows with Dany Davis MD.  [facility]. Patient History and Records, EMR reviewed. Patient Interviewed and examined. Fair historian. Broken English.  is present for translation. Other than above she has no other complaints. Denies SOB, LH, Dizziness, TIA or CVA Symptoms. No Orthopnea, Edema or CHF symptoms. No Palpitations. No Syncope. No Fever, Chills or Cold symptoms. No GI,  or Bleeding complaints. Cardiac and general ROS otherwise negative. [address] otherwise negative other than noted. Past Medical History:   Diagnosis Date    Asthma     CAD (coronary artery disease)     CKD (chronic kidney disease) stage 3, GFR 30-59 ml/min ([facility])     Colitis     Diabetes mellitus ([address]     Hyperlipidemia     Hypertension     PAD (peripheral artery disease) ([facility])     Prolonged emergence from general anesthesia     PVD [facility]peripheral vascular disease) [facility]     Thyroid disease        Past Surgical History:   Procedure Laterality Date    CARDIAC SURGERY       SECTION      COLONOSCOPY N/A 2019    COLONOSCOPY DIAGNOSTIC performed by Loki Samayoa MD at [address] [place] [address] GRAFT  2019    unknown vessels    HYSTERECTOMY      TOE AMPUTATION Right     3rd toe       Prior to Admission medications    Medication Sig Start Date End Date Taking?  Authorizing Provider   prazosin (MINIPRESS) 2 MG capsule Take 2 mg by mouth nightly  20  Yes Historical Provider, MD   sertraline (ZOLOFT) 100 MG tablet Take 200 mg by mouth daily  9/14/20  Yes Historical Provider, MD   levothyroxine (SYNTHROID) 50 MCG tablet Take 1 tablet by mouth Daily 9/21/20  Yes Roxana Leal MD   insulin lispro, 1 Unit Dial, (HUMALOG KWIKPEN) 100 UNIT/ML SOPN 10 units at each meals 9/21/20  Yes Roxana Leal MD   montelukast (SINGULAIR) 10 MG tablet take 1 tablet by mouth at bedtime 9/18/20  Yes Jose David Fuentes MD   furosemide (LASIX) 40 MG tablet Take 1 tablet by mouth daily 9/4/20  Yes Freda Magaña MD   magnesium oxide (MAG-OX) 400 MG tablet Take 1 tablet by mouth daily 9/4/20  Yes Freda Magaña MD   ticagrelor (BRILINTA) 90 MG TABS tablet Take 90 mg by mouth 2 times daily   Yes Historical Provider, MD   CPAP Machine MISC by Does not apply route New CPAP with 10 cm 8/20/20  Yes Jose David Fuentes MD   aspirin 81 MG EC tablet Take 1 tablet by mouth daily 6/30/20  Yes Dorothy Sage MD   atorvastatin (LIPITOR) 80 MG tablet Take 1 tablet by mouth nightly 6/29/20  Yes Dorothy Sage MD   OXYGEN 2 lit O2 with sleep , please give O2 concentrator 6/12/20  Yes Jose David Fuentes MD   Emollient (EUCERIN INTENSIVE REPAIR HAND) 2.5-10 % CREA APPLY TO FEET AT BEDTIME; PLACE SOCKS OVER FEET AFTER APPLICATION IF NEEDED FOR DRY CRACKING FEET 3/15/20  Yes Historical Provider, MD   hydrOXYzine (VISTARIL) 25 MG capsule Take 50 mg by mouth 3 times daily as needed  3/4/20  Yes Historical Provider, MD   insulin glargine (LANTUS) 100 UNIT/ML injection vial Inject 30 Units into the skin nightly  Patient taking differently: Inject 35 Units into the skin nightly  5/12/20  Yes Magalene Lundborg, MD   isosorbide dinitrate (ISORDIL) 20 MG tablet Take 1 tablet by mouth 3 times daily 4/28/20  Yes Dorothy Sage MD   hydrALAZINE (APRESOLINE) 50 MG tablet Take 1 tablet by mouth every 8 hours 4/28/20  Yes Dorothy Sage MD   metoprolol succinate (TOPROL XL) 50 MG extended release tablet Take 1 tablet by mouth 2 times daily 4/28/20  Yes Dorothy Sage MD amitriptyline (ELAVIL) 25 MG tablet Take 25 mg by mouth nightly   Yes Historical Provider, MD   albuterol sulfate HFA (VENTOLIN HFA) 108 (90 Base) MCG/ACT inhaler Inhale 2 puffs into the lungs as needed for Wheezing Every 4-6 hours PRN   Yes Historical Provider, MD   acetaminophen (TYLENOL) 500 MG tablet Take 500 mg by mouth 3 times daily Take with Tramadol   Yes Historical Provider, MD   gabapentin (NEURONTIN) 400 MG capsule Take 400 mg by mouth 2 times daily. AM and 800 mg in pm-9pm   Yes Historical Provider, MD   haloperidol (HALDOL) 5 MG tablet Take 5 mg by mouth daily   Yes Historical Provider, MD   folic acid (FOLVITE) 1 MG tablet Take 1 mg by mouth daily   Yes Historical Provider, MD   Iron Polysacch Dwhbc-S11-BB (NIFEREX-150 FORTE PO) Take 1 tablet by mouth daily    Yes Historical Provider, MD   Cyanocobalamin (VITAMIN B-12) 1000 MCG extended release tablet Take 1,000 mcg by mouth daily   Yes Historical Provider, MD   meclizine (ANTIVERT) 25 MG tablet Take 25 mg by mouth three times daily   Yes Historical Provider, MD   Alcohol Swabs (ALCOHOL PREP) 70 % PADS qid 9/21/20   Le Maharaj MD   Continuous Blood Gluc Sensor (FREESTYLE FELY 14 DAY SENSOR) MISC Every 2 weeks 9/21/20   Le Maharaj MD   Continuous Blood Gluc  (FREESTYLE FELY 14 DAY READER) CATHLEEN As directed 9/21/20   Le Maharaj MD   Insulin Pen Needle (NOVOFINE) 32G X 6 MM MISC qid 9/21/20   Le Maharaj MD   Nutritional Supplements ([address]) LIQD take as directed three times a day 6/1/20   Historical Provider, MD   nitroGLYCERIN (NITROSTAT) 0.4 MG SL tablet up to max of 3 total doses. If no relief after 1 dose, call 911. 4/28/20   MD George CaputoStyle Lancets MISC 1 each by Does not apply route daily 1/30/20   Le Maharaj MD   blood glucose test strips (FREESTYLE LITE) strip 1 each by In Vitro route daily As needed.  1/30/20   Le Maharaj MD       Scheduled Meds:   [START ON 10/1/2020] famotidine  20 mg Oral Daily    sodium chloride flush  10 mL Intravenous 2 times per day    atorvastatin  40 mg Oral Nightly    aspirin  325 mg Oral Daily    enoxaparin  40 mg Subcutaneous Daily    nitroglycerin  1 inch Topical 4 times per day    gabapentin  400 mg Oral BID    haloperidol  5 mg Oral Daily    hydrALAZINE  50 mg Oral 3 times per day    insulin glargine  30 Units Subcutaneous Nightly    isosorbide dinitrate  20 mg Oral TID    levothyroxine  50 mcg Oral Daily    metoprolol succinate  50 mg Oral BID    sertraline  200 mg Oral Daily    ticagrelor  90 mg Oral BID    insulin lispro  0-6 Units Subcutaneous TID     insulin lispro  0-3 Units Subcutaneous Nightly     Continuous Infusions:   dextrose       PRN Meds:morphine, sodium chloride flush, potassium chloride **OR** potassium alternative oral replacement **OR** potassium chloride, acetaminophen **OR** acetaminophen, polyethylene glycol, nitroGLYCERIN, hydrOXYzine, glucose, dextrose, glucagon (rDNA), dextrose, ondansetron    Allergies   Allergen Reactions    Ambien [Zolpidem Tartrate]     Capoten [Captopril]     Clioquinol     Cogentin [Benztropine]     Depakote [Divalproex Sodium]     Effexor Xr [Venlafaxine Hcl Er]     Geodon [Ziprasidone Hcl]     Lisinopril      Hyperkalemia: 4/21/20 potassium was 6.7    Lyrica [Pregabalin]     Navane [Thiothixene]     Pamelor [Nortriptyline Hcl]     Remeron [Mirtazapine]     Risperdal [Risperidone]     Trazodone And Nefazodone     Wellbutrin [Bupropion]        Social History     Socioeconomic History    Marital status:      Spouse name: Not on file    Number of children: Not on file    Years of education: Not on file    Highest education level: Not on file   Occupational History    Not on file   Social Needs    Financial resource strain: Not on file    Food insecurity     Worry: Not on file     Inability: Not on file    Transportation needs     Medical: Not on file     Non-medical: Not on file   Tobacco Use    Smoking status: Never Smoker    Smokeless tobacco: Never Used   Substance and Sexual Activity    Alcohol use: Never     Frequency: Never    Drug use: Never    Sexual activity: Not on file   Lifestyle    Physical activity     Days per week: Not on file     Minutes per session: Not on file    Stress: Not on file   Relationships    Social connections     Talks on phone: Not on file     Gets together: Not on file     Attends Denominational service: Not on file     Active member of club or organization: Not on file     Attends meetings of clubs or organizations: Not on file     Relationship status: Not on file    Intimate partner violence     Fear of current or ex partner: Not on file     Emotionally abused: Not on file     Physically abused: Not on file     Forced sexual activity: Not on file   Other Topics Concern    Not on file   Social History Narrative         Lives With: Spouse    Type of Home: [address]: One level    Home Access: Elevator    Bathroom Shower/Tub: Tub/Shower unit(Simultaneous filing. User may not have seen previous data.)    Bathroom Equipment: Grab bars in shower, Shower chair    Home Equipment: Rolling walker, [address] 450 bed    ADL Assistance: Needs assistance    Homemaking Assistance: Needs assistance    Homemaking Responsibilities: No    Ambulation Assistance: Independent    Transfer Assistance: Independent    Active : No    Additional Comments: Pt wears orthopedic shoes at baseline    The patient states she is current with [facility](RN per the ). The patient had a walker, but obtained a hospital bed, wheel chair, BSC and O2 last admission (from [address]). pharmacy is [address] [place] [address],[address] [address]., [facility]. Transportation is provided by [employer] () per the patient       History reviewed. No pertinent family history.     Review Of Systems:    14 point ROS negative other than mentioned. Physical Exam:    CURRENT VITALS: /62   Pulse 71   Temp 98.6 °F (37 °C) (Oral)   Resp 22   Ht 5' 4\" (1.626 m)   Wt 242 lb 14.4 oz (110.2 kg)   SpO2 98%   BMI 41.69 kg/m²     CONSTITUTIONAL:  awake, alert, cooperative, no apparent distress, anxious. ENT:  Normocephalic, without obvious abnormality, atraumatic, sinuses nontender on palpation, external ears without lesions,  NECK:  Supple, symmetrical, trachea midline, no adenopathy, thyroid symmetric, not enlarged and no tenderness, skin normal, No bruits. LUNGS:  No increased work of breathing, good air exchange, clear to auscultation bilaterally, no crackles, no wheezing  CHEST: Midline scar well-healed. Reproducible left ijeoma-breast discomfort with palpation. CARDIOVASCULAR:  Normal apical impulse, regular rate and rhythm, normal S1 and S2,  1/6 Systolic murmur noted. ABDOMEN:  Obese, normal bowel sounds, soft, non-distended, non-tender, no masses palpated, no hepatosplenomegally  EXTREMETIES: No edema, Pulses Strong Thruout. No ulcers. NEUROLOGIC:  Awake, alert, oriented to name, place and time. Following all commands and moving all extremties.   SKIN:  no bruising or bleeding, normal skin color, texture, turgor and no rashes    Labs:  Recent Results (from the past 24 hour(s))   Comprehensive Metabolic Panel    Collection Time: 09/29/20 11:30 AM   Result Value Ref Range    Sodium 139 135 - 144 mEq/L    Potassium 4.0 3.4 - 4.9 mEq/L    Chloride 99 95 - 107 mEq/L    CO2 28 20 - 31 mEq/L    Anion Gap 12 9 - 15 mEq/L    Glucose 72 70 - 99 mg/dL    BUN 46 (H) 8 - 23 mg/dL    CREATININE 1.30 (H) 0.50 - 0.90 mg/dL    GFR Non-African American 41.4 (L) >60    GFR  50.1 (L) >60    Calcium 9.7 8.5 - 9.9 mg/dL    Total Protein 7.9 6.3 - 8.0 g/dL    Alb 3.9 3.5 - 4.6 g/dL    Total Bilirubin 0.4 0.2 - 0.7 mg/dL    Alkaline Phosphatase 115 40 - 130 U/L    ALT 22 0 - 33 U/L    AST 19 0 - 35 U/L    Globulin 4.0 (H) 2.3 - 3.5 g/dL   CBC Auto Differential    Collection Time: 09/29/20 11:30 AM   Result Value Ref Range    WBC 11.6 (H) 4.8 - 10.8 K/uL    RBC 4.94 4.20 - 5.40 M/uL    Hemoglobin 11.7 (L) 12.0 - 16.0 g/dL    Hematocrit 34.6 (L) 37.0 - 47.0 %    MCV 70.1 (L) 82.0 - 100.0 fL    MCH 23.7 (L) 27.0 - 31.3 pg    MCHC 33.9 33.0 - 37.0 %    RDW 20.6 (H) 11.5 - 14.5 %    Platelets 070 873 - 960 K/uL    PLATELET SLIDE REVIEW Normal     Neutrophils % 62.3 %    Lymphocytes % 26.0 %    Monocytes % 8.5 %    Eosinophils % 1.7 %    Basophils % 1.5 %    Neutrophils Absolute 7.2 (H) 1.4 - 6.5 K/uL    Lymphocytes Absolute 3.0 1.0 - 4.8 K/uL    Monocytes Absolute 1.0 (H) 0.2 - 0.8 K/uL    Eosinophils Absolute 0.2 0.0 - 0.7 K/uL    Basophils Absolute 0.2 0.0 - 0.2 K/uL    Anisocytosis 1+     Microcytes 1+     Polychromasia 1+     Poikilocytes 1+     Target Cells 1+    Magnesium    Collection Time: 09/29/20 11:30 AM   Result Value Ref Range    Magnesium 2.1 1.7 - 2.4 mg/dL   Troponin    Collection Time: 09/29/20 11:30 AM   Result Value Ref Range    Troponin <0.010 0.000 - 0.010 ng/mL   TSH without Reflex    Collection Time: 09/29/20 11:30 AM   Result Value Ref Range    TSH 2.810 0.440 - 3.860 uIU/mL   APTT    Collection Time: 09/29/20 11:30 AM   Result Value Ref Range    aPTT 29.9 24.4 - 36.8 sec   Protime-INR    Collection Time: 09/29/20 11:30 AM   Result Value Ref Range    Protime 14.1 12.3 - 14.9 sec    INR 1.1    Brain Natriuretic Peptide    Collection Time: 09/29/20 11:30 AM   Result Value Ref Range    Pro-BNP 5,729 pg/mL   EKG 12 Lead    Collection Time: 09/29/20 11:30 AM   Result Value Ref Range    Ventricular Rate 54 BPM    Atrial Rate 54 BPM    P-R Interval 158 ms    QRS Duration 108 ms    Q-T Interval 488 ms    QTc Calculation (Bazett) 462 ms    P Axis -64 degrees    R Axis 61 degrees    T Axis -63 degrees   Troponin    Collection Time: 09/29/20  3:30 PM   Result Value Ref Range    Troponin <0.010 0.000 - 0.010 ng/mL   POCT Glucose    Collection Time: 09/29/20  3:56 PM   Result Value Ref Range    POC Glucose 138 (H) 60 - 115 mg/dl    Performed on ACCU-CHEK    POCT Glucose    Collection Time: 09/29/20  7:58 PM   Result Value Ref Range    POC Glucose 199 (H) 60 - 115 mg/dl    Performed on ACCU-CHEK    Troponin    Collection Time: 09/29/20 11:12 PM   Result Value Ref Range    Troponin <0.010 0.000 - 0.010 ng/mL   EKG 12 Lead    Collection Time: 09/30/20  1:12 AM   Result Value Ref Range    Ventricular Rate 56 BPM    Atrial Rate 56 BPM    P-R Interval 166 ms    QRS Duration 100 ms    Q-T Interval 462 ms    QTc Calculation (Bazett) 445 ms    P Axis 9 degrees    R Axis 250 degrees    T Axis 80 degrees   CBC    Collection Time: 09/30/20  5:46 AM   Result Value Ref Range    WBC 11.4 (H) 4.8 - 10.8 K/uL    RBC 4.51 4.20 - 5.40 M/uL    Hemoglobin 10.6 (L) 12.0 - 16.0 g/dL    Hematocrit 32.3 (L) 37.0 - 47.0 %    MCV 71.8 (L) 82.0 - 100.0 fL    MCH 23.5 (L) 27.0 - 31.3 pg    MCHC 32.7 (L) 33.0 - 37.0 %    RDW 20.8 (H) 11.5 - 14.5 %    Platelets 031 289 - 494 K/uL   Comprehensive Metabolic Panel    Collection Time: 09/30/20  5:46 AM   Result Value Ref Range    Sodium 134 (L) 135 - 144 mEq/L    Potassium 5.4 (H) 3.4 - 4.9 mEq/L    Chloride 98 95 - 107 mEq/L    CO2 26 20 - 31 mEq/L    Anion Gap 10 9 - 15 mEq/L    Glucose 152 (H) 70 - 99 mg/dL    BUN 59 (H) 8 - 23 mg/dL    CREATININE 1.64 (H) 0.50 - 0.90 mg/dL    GFR Non-African American 31.7 (L) >60    GFR  38.3 (L) >60    Calcium 9.2 8.5 - 9.9 mg/dL    Total Protein 7.1 6.3 - 8.0 g/dL    Alb 3.4 (L) 3.5 - 4.6 g/dL    Total Bilirubin 0.5 0.2 - 0.7 mg/dL    Alkaline Phosphatase 117 40 - 130 U/L    ALT 27 0 - 33 U/L    AST 28 0 - 35 U/L    Globulin 3.7 (H) 2.3 - 3.5 g/dL   Magnesium    Collection Time: 09/30/20  5:46 AM   Result Value Ref Range    Magnesium 2.5 (H) 1.7 - 2.4 mg/dL   Troponin    Collection Time: 09/30/20  5:46 AM   Result Value Ref Range    Troponin <0.010 0.000 - 0.010 ng/mL   High sensitivity CRP    Collection Time: 09/30/20  5:46 AM   Result Value Ref Range    CRP High Sensitivity 31.4 (H) 0.0 - 5.0 mg/L   Sedimentation Rate    Collection Time: 09/30/20  5:46 AM   Result Value Ref Range    Sed Rate 52 (H) 0 - 30 mm   Lipid panel - fasting    Collection Time: 09/30/20  5:46 AM   Result Value Ref Range    Cholesterol, Total 139 0 - 199 mg/dL    Triglycerides 69 0 - 150 mg/dL    HDL 40 40 - 59 mg/dL    LDL Calculated 85 0 - 129 mg/dL   POCT Glucose    Collection Time: 09/30/20  6:07 AM   Result Value Ref Range    POC Glucose 161 (H) 60 - 115 mg/dl    Performed on ACCU-CHEK        ECG:     Sinus rhythm, poor R wave ventricle pressure nonspecific interventricular conduction delay. ECHO:    September 16, 2020. LVEF 55 to 60%. 1-2+ MR. Mitral annular calcification. RVSP 44.         Shira Dobson MD  [facility] Cardiologist      Electronically signed on 9/29/20 at 2:54 PM EDT      -----

## 2020-10-01 ENCOUNTER — APPOINTMENT (OUTPATIENT)
Dept: ULTRASOUND IMAGING | Age: 65
DRG: 469 | End: 2020-10-01
Payer: COMMERCIAL

## 2020-10-01 LAB
ANION GAP SERPL CALCULATED.3IONS-SCNC: 10 MEQ/L (ref 9–15)
ANION GAP SERPL CALCULATED.3IONS-SCNC: 11 MEQ/L (ref 9–15)
BUN BLDV-MCNC: 65 MG/DL (ref 8–23)
BUN BLDV-MCNC: 66 MG/DL (ref 8–23)
CALCIUM SERPL-MCNC: 8.7 MG/DL (ref 8.5–9.9)
CALCIUM SERPL-MCNC: 8.9 MG/DL (ref 8.5–9.9)
CHLORIDE BLD-SCNC: 97 MEQ/L (ref 95–107)
CHLORIDE BLD-SCNC: 98 MEQ/L (ref 95–107)
CO2: 26 MEQ/L (ref 20–31)
CO2: 27 MEQ/L (ref 20–31)
CREAT SERPL-MCNC: 1.84 MG/DL (ref 0.5–0.9)
CREAT SERPL-MCNC: 1.9 MG/DL (ref 0.5–0.9)
EOSINOPHIL,URINE: NORMAL
GFR AFRICAN AMERICAN: 32.3
GFR AFRICAN AMERICAN: 33.5
GFR NON-AFRICAN AMERICAN: 26.7
GFR NON-AFRICAN AMERICAN: 27.7
GLUCOSE BLD-MCNC: 157 MG/DL (ref 70–99)
GLUCOSE BLD-MCNC: 161 MG/DL (ref 60–115)
GLUCOSE BLD-MCNC: 172 MG/DL (ref 60–115)
GLUCOSE BLD-MCNC: 192 MG/DL (ref 60–115)
GLUCOSE BLD-MCNC: 209 MG/DL (ref 60–115)
GLUCOSE BLD-MCNC: 224 MG/DL (ref 70–99)
PERFORMED ON: ABNORMAL
POTASSIUM SERPL-SCNC: 4.8 MEQ/L (ref 3.4–4.9)
POTASSIUM SERPL-SCNC: 4.8 MEQ/L (ref 3.4–4.9)
SODIUM BLD-SCNC: 134 MEQ/L (ref 135–144)
SODIUM BLD-SCNC: 135 MEQ/L (ref 135–144)

## 2020-10-01 PROCEDURE — 2060000000 HC ICU INTERMEDIATE R&B

## 2020-10-01 PROCEDURE — 2580000003 HC RX 258: Performed by: INTERNAL MEDICINE

## 2020-10-01 PROCEDURE — 6370000000 HC RX 637 (ALT 250 FOR IP): Performed by: INTERNAL MEDICINE

## 2020-10-01 PROCEDURE — 36415 COLL VENOUS BLD VENIPUNCTURE: CPT

## 2020-10-01 PROCEDURE — 76775 US EXAM ABDO BACK WALL LIM: CPT

## 2020-10-01 PROCEDURE — 80048 BASIC METABOLIC PNL TOTAL CA: CPT

## 2020-10-01 PROCEDURE — 6360000002 HC RX W HCPCS: Performed by: INTERNAL MEDICINE

## 2020-10-01 RX ORDER — ISOSORBIDE DINITRATE 10 MG/1
20 TABLET ORAL 2 TIMES DAILY
Status: DISCONTINUED | OUTPATIENT
Start: 2020-10-02 | End: 2020-10-03 | Stop reason: HOSPADM

## 2020-10-01 RX ORDER — SODIUM CHLORIDE 9 MG/ML
INJECTION, SOLUTION INTRAVENOUS CONTINUOUS
Status: DISPENSED | OUTPATIENT
Start: 2020-10-01 | End: 2020-10-02

## 2020-10-01 RX ADMIN — TICAGRELOR 90 MG: 90 TABLET ORAL at 20:39

## 2020-10-01 RX ADMIN — ATORVASTATIN CALCIUM 40 MG: 40 TABLET, FILM COATED ORAL at 20:39

## 2020-10-01 RX ADMIN — Medication 17.2 MG: at 20:43

## 2020-10-01 RX ADMIN — GABAPENTIN 400 MG: 400 CAPSULE ORAL at 08:06

## 2020-10-01 RX ADMIN — FAMOTIDINE 20 MG: 20 TABLET ORAL at 08:06

## 2020-10-01 RX ADMIN — AMITRIPTYLINE HYDROCHLORIDE 25 MG: 25 TABLET, FILM COATED ORAL at 20:38

## 2020-10-01 RX ADMIN — TICAGRELOR 90 MG: 90 TABLET ORAL at 08:06

## 2020-10-01 RX ADMIN — METOPROLOL SUCCINATE 50 MG: 50 TABLET, EXTENDED RELEASE ORAL at 20:39

## 2020-10-01 RX ADMIN — SODIUM CHLORIDE: 9 INJECTION, SOLUTION INTRAVENOUS at 15:17

## 2020-10-01 RX ADMIN — METOPROLOL SUCCINATE 50 MG: 50 TABLET, EXTENDED RELEASE ORAL at 08:06

## 2020-10-01 RX ADMIN — ENOXAPARIN SODIUM 40 MG: 40 INJECTION SUBCUTANEOUS at 08:06

## 2020-10-01 RX ADMIN — LEVOTHYROXINE SODIUM 50 MCG: 0.05 TABLET ORAL at 05:12

## 2020-10-01 RX ADMIN — DOCUSATE SODIUM 100 MG: 100 CAPSULE, LIQUID FILLED ORAL at 20:39

## 2020-10-01 RX ADMIN — ASPIRIN 325 MG: 325 TABLET, FILM COATED ORAL at 08:06

## 2020-10-01 RX ADMIN — SERTRALINE 200 MG: 100 TABLET, FILM COATED ORAL at 08:06

## 2020-10-01 RX ADMIN — DOCUSATE SODIUM 100 MG: 100 CAPSULE, LIQUID FILLED ORAL at 08:06

## 2020-10-01 RX ADMIN — ISOSORBIDE DINITRATE 20 MG: 10 TABLET ORAL at 08:06

## 2020-10-01 RX ADMIN — PRAZOSIN HYDROCHLORIDE 2 MG: 2 CAPSULE ORAL at 20:43

## 2020-10-01 RX ADMIN — GABAPENTIN 400 MG: 400 CAPSULE ORAL at 20:38

## 2020-10-01 RX ADMIN — FOLIC ACID 1 MG: 1 TABLET ORAL at 08:06

## 2020-10-01 RX ADMIN — ISOSORBIDE DINITRATE 20 MG: 10 TABLET ORAL at 15:17

## 2020-10-01 RX ADMIN — HALOPERIDOL 5 MG: 5 TABLET ORAL at 08:06

## 2020-10-01 RX ADMIN — Medication 10 ML: at 08:07

## 2020-10-01 RX ADMIN — HYDRALAZINE HYDROCHLORIDE 50 MG: 50 TABLET, FILM COATED ORAL at 05:12

## 2020-10-01 ASSESSMENT — PAIN SCALES - GENERAL
PAINLEVEL_OUTOF10: 2
PAINLEVEL_OUTOF10: 0

## 2020-10-01 NOTE — PROGRESS NOTES
[facility] Cardiology Progress Note        Date:   10/1/2020    Patient:    Fidencio Severance    :    1955  CSN:    [id number]    Rounding Cardiologist: Emily Cole MD     Primary Cardiologist: Dereck Can MD , [facility]    Requesting Physician:  Tabitha Cristina MD      Reason for Initial Consult:  Chest Pain      Subjective:    No changes overall. 14 system General and Cardiac ROS otherwise negative or unchanged. Assessment:    1. Chest pain, reproducible, atypical, c/w costochondritis, doubt acute coronary syndrome. 2. Shortness of breath  3. Negative troponin, no acute EKG abnormality, doubt ACS. 4. Congestive heart failure, Diastolic and systolic, history, no apparent decompensation currently. 5. ICM, LVEF 45%  6. MR, 3+  7. CAD, Prior CABG, Status post PCI / stent of the ostial circumflex 20.   8. HTN  9. HLP  10. DM.  11. PHTN  12. Renal insufficiency  13. Anemia  14. PVOD bilateral Legs, Post revascularization [facility] prior. 15. Probable JESICA    Plan:    1. Cardiac Supportive Care  2. Doubt primary cardiovascular problems this admission. Most likely costochondritis recurrence. 3. Primary hospital and pulmonary care as warranted. 4. Continue current medications. 5. Okay to discharge home from cardiovascular standpoint once okay with others. 6. Follow-up with [facility] as scheduled, earlier if need be.  7. Further Recommendations to follow if needed. 8. See Orders        HISTORY OF PRESENT ILLNESS:      Fidencio Severance is a pleasant 60 y.o. female with the above past history including multiple admissions for atypical chest pain thought to be costochondritis and known coronary artery disease post coronary bypass surgery  with recent left circumflex angioplasty 2020. She does have ischemic cardiomyopathy ejection fraction 50 to 60% by echocardiogram 2020.   She has a history of hypertension hyperlipidemia and diabetes as well as hypothyroidism. She does have peripheral vascular disease post prior right bypass . She has a history of schizoaffective psychosis. She now re-presents with similar chest discomfort symptoms. These of chest discomfort symptoms are reproducible on palpation as prior admissions. Initial troponins have been negative. Cardiology was asked to see for further evaluation and treatment given her past history. Patient Follows with Merle Nassar MD.  [facility]. Patient History and Records, EMR reviewed. Patient Interviewed and examined. Fair historian. Broken English.  is present for translation. Other than above she has no other complaints. Denies SOB, LH, Dizziness, TIA or CVA Symptoms. No Orthopnea, Edema or CHF symptoms. No Palpitations. No Syncope. No Fever, Chills or Cold symptoms. No GI,  or Bleeding complaints. Cardiac and general ROS otherwise negative. [address] otherwise negative other than noted. Past Medical History:   Diagnosis Date    Asthma     CAD (coronary artery disease)     CKD (chronic kidney disease) stage 3, GFR 30-59 ml/min ([facility])     Colitis     Diabetes mellitus ([address]     Hyperlipidemia     Hypertension     PAD (peripheral artery disease) ([facility])     Prolonged emergence from general anesthesia     PVD [facility] disease) [facility]     Thyroid disease        Past Surgical History:   Procedure Laterality Date    CARDIAC SURGERY       SECTION      COLONOSCOPY N/A 2019    COLONOSCOPY DIAGNOSTIC performed by Ailyn Lee MD at [address] [place] [address] GRAFT  2019    unknown vessels    HYSTERECTOMY      TOE AMPUTATION Right     3rd toe       Prior to Admission medications    Medication Sig Start Date End Date Taking?  Authorizing Provider   prazosin (MINIPRESS) 2 MG capsule Take 2 mg by mouth nightly  20  Yes Historical Provider, MD   sertraline (ZOLOFT) 100 MG tablet Take 200 mg by mouth daily  9/14/20  Yes Historical Provider, MD   levothyroxine (SYNTHROID) 50 MCG tablet Take 1 tablet by mouth Daily 9/21/20  Yes Virgie Garduno MD   insulin lispro, 1 Unit Dial, (HUMALOG KWIKPEN) 100 UNIT/ML SOPN 10 units at each meals 9/21/20  Yes Virgie Garduno MD   montelukast (SINGULAIR) 10 MG tablet take 1 tablet by mouth at bedtime 9/18/20  Yes Abram Martins MD   furosemide (LASIX) 40 MG tablet Take 1 tablet by mouth daily 9/4/20  Yes Vincent Soriano MD   magnesium oxide (MAG-OX) 400 MG tablet Take 1 tablet by mouth daily 9/4/20  Yes Vincent Soriano MD   ticagrelor (BRILINTA) 90 MG TABS tablet Take 90 mg by mouth 2 times daily   Yes Historical Provider, MD   CPAP Machine MISC by Does not apply route New CPAP with 10 cm 8/20/20  Yes Abram Martins MD   aspirin 81 MG EC tablet Take 1 tablet by mouth daily 6/30/20  Yes Ricky Short MD   atorvastatin (LIPITOR) 80 MG tablet Take 1 tablet by mouth nightly 6/29/20  Yes Ricky Short MD   OXYGEN 2 lit O2 with sleep , please give O2 concentrator 6/12/20  Yes Abram Martins MD   Emollient (EUCERIN INTENSIVE REPAIR HAND) 2.5-10 % CREA APPLY TO FEET AT BEDTIME; PLACE SOCKS OVER FEET AFTER APPLICATION IF NEEDED FOR DRY CRACKING FEET 3/15/20  Yes Historical Provider, MD   hydrOXYzine (VISTARIL) 25 MG capsule Take 50 mg by mouth 3 times daily as needed  3/4/20  Yes Historical Provider, MD   insulin glargine (LANTUS) 100 UNIT/ML injection vial Inject 30 Units into the skin nightly  Patient taking differently: Inject 35 Units into the skin nightly  5/12/20  Yes Alexa Martinez MD   isosorbide dinitrate (ISORDIL) 20 MG tablet Take 1 tablet by mouth 3 times daily 4/28/20  Yes Ricky Short MD   hydrALAZINE (APRESOLINE) 50 MG tablet Take 1 tablet by mouth every 8 hours 4/28/20  Yes Ricky Short MD   metoprolol succinate (TOPROL XL) 50 MG extended release tablet Take 1 tablet by mouth 2 times daily 4/28/20  Yes Ricky Short MD amitriptyline (ELAVIL) 25 MG tablet Take 25 mg by mouth nightly   Yes Historical Provider, MD   albuterol sulfate HFA (VENTOLIN HFA) 108 (90 Base) MCG/ACT inhaler Inhale 2 puffs into the lungs as needed for Wheezing Every 4-6 hours PRN   Yes Historical Provider, MD   acetaminophen (TYLENOL) 500 MG tablet Take 500 mg by mouth 3 times daily Take with Tramadol   Yes Historical Provider, MD   gabapentin (NEURONTIN) 400 MG capsule Take 400 mg by mouth 2 times daily. AM and 800 mg in pm-9pm   Yes Historical Provider, MD   haloperidol (HALDOL) 5 MG tablet Take 5 mg by mouth daily   Yes Historical Provider, MD   folic acid (FOLVITE) 1 MG tablet Take 1 mg by mouth daily   Yes Historical Provider, MD   Iron Polysacch Nrdub-E58-BI (NIFEREX-150 FORTE PO) Take 1 tablet by mouth daily    Yes Historical Provider, MD   Cyanocobalamin (VITAMIN B-12) 1000 MCG extended release tablet Take 1,000 mcg by mouth daily   Yes Historical Provider, MD   meclizine (ANTIVERT) 25 MG tablet Take 25 mg by mouth three times daily   Yes Historical Provider, MD   Alcohol Swabs (ALCOHOL PREP) 70 % PADS qid 9/21/20   Roxana Leal MD   Continuous Blood Gluc Sensor (FREESTYLE FELY 14 DAY SENSOR) MISC Every 2 weeks 9/21/20   Roxana Leal MD   Continuous Blood Gluc  (FREESTYLE FELY 14 DAY READER) CATHLEEN As directed 9/21/20   Roxana Leal MD   Insulin Pen Needle (NOVOFINE) 32G X 6 MM MISC qid 9/21/20   Roxana Leal MD   Nutritional Supplements ([address]) LIQD take as directed three times a day 6/1/20   Historical Provider, MD   nitroGLYCERIN (NITROSTAT) 0.4 MG SL tablet up to max of 3 total doses. If no relief after 1 dose, call 911. 4/28/20   MD George RushingStyle Lancets MISC 1 each by Does not apply route daily 1/30/20   Roxana Leal MD   blood glucose test strips (FREESTYLE LITE) strip 1 each by In Vitro route daily As needed.  1/30/20   Roxana Leal MD       Scheduled Meds:   famotidine  20 mg Oral Daily    senna  2 tablet Oral Nightly    docusate sodium  100 mg Oral BID    amitriptyline  25 mg Oral Nightly    folic acid  1 mg Oral Daily    prazosin  2 mg Oral Nightly    sodium chloride flush  10 mL Intravenous 2 times per day    atorvastatin  40 mg Oral Nightly    aspirin  325 mg Oral Daily    enoxaparin  40 mg Subcutaneous Daily    gabapentin  400 mg Oral BID    haloperidol  5 mg Oral Daily    hydrALAZINE  50 mg Oral 3 times per day    insulin glargine  30 Units Subcutaneous Nightly    isosorbide dinitrate  20 mg Oral TID    levothyroxine  50 mcg Oral Daily    metoprolol succinate  50 mg Oral BID    sertraline  200 mg Oral Daily    ticagrelor  90 mg Oral BID    insulin lispro  0-6 Units Subcutaneous TID     insulin lispro  0-3 Units Subcutaneous Nightly     Continuous Infusions:   sodium chloride 50 mL/hr at 10/01/20 1517    dextrose       PRN Meds:sodium chloride flush, potassium chloride **OR** potassium alternative oral replacement **OR** potassium chloride, acetaminophen **OR** acetaminophen, polyethylene glycol, nitroGLYCERIN, glucose, dextrose, glucagon (rDNA), dextrose, ondansetron    Allergies   Allergen Reactions    Ambien [Zolpidem Tartrate]     Capoten [Captopril]     Clioquinol     Cogentin [Benztropine]     Depakote [Divalproex Sodium]     Effexor Xr [Venlafaxine Hcl Er]     Geodon [Ziprasidone Hcl]     Lisinopril      Hyperkalemia: 4/21/20 potassium was 6.7    Lyrica [Pregabalin]     Navane [Thiothixene]     Pamelor [Nortriptyline Hcl]     Remeron [Mirtazapine]     Risperdal [Risperidone]     Trazodone And Nefazodone     Wellbutrin [Bupropion]        Social History     Socioeconomic History    Marital status:      Spouse name: Not on file    Number of children: Not on file    Years of education: Not on file    Highest education level: Not on file   Occupational History    Not on file   Social Needs    Financial resource strain: Not on file    Food insecurity     Worry: Not () per the patient       History reviewed. No pertinent family history. Review Of Systems:    14 point ROS negative other than mentioned. Physical Exam:    CURRENT VITALS: BP (!) 112/94   Pulse 56   Temp 97.4 °F (36.3 °C) (Oral)   Resp 12   Ht 5' 4\" (1.626 m)   Wt 234 lb 6.4 oz (106.3 kg)   SpO2 96%   BMI 40.23 kg/m²     CONSTITUTIONAL:  awake, alert, cooperative, no apparent distress, anxious. ENT:  Normocephalic, without obvious abnormality, atraumatic, sinuses nontender on palpation, external ears without lesions,  NECK:  Supple, symmetrical, trachea midline, no adenopathy, thyroid symmetric, not enlarged and no tenderness, skin normal, No bruits. LUNGS:  No increased work of breathing, good air exchange, clear to auscultation bilaterally, no crackles, no wheezing  CHEST: Midline scar well-healed. Reproducible left ijeoma-breast discomfort with palpation. CARDIOVASCULAR:  Normal apical impulse, regular rate and rhythm, normal S1 and S2,  1/6 Systolic murmur noted. ABDOMEN:  Obese, normal bowel sounds, soft, non-distended, non-tender, no masses palpated, no hepatosplenomegally  EXTREMETIES: No edema, Pulses Strong Thruout. No ulcers. NEUROLOGIC:  Awake, alert, oriented to name, place and time. Following all commands and moving all extremties.   SKIN:  no bruising or bleeding, normal skin color, texture, turgor and no rashes    Labs:  Recent Results (from the past 24 hour(s))   Microalbumin / Creatinine Urine Ratio    Collection Time: 09/30/20  4:45 PM   Result Value Ref Range    Microalbumin, Random Urine 3.80 (H) Not Established mg/dL    Creatinine, Ur 111.0 Not Established mg/dL    Microalbumin Creatinine Ratio 34.2 (H) 0.0 - 30.0 mg/G   Eosinophil Smear Urine    Collection Time: 09/30/20  4:45 PM   Result Value Ref Range    Eosinophil, Ur None Seen    POCT Glucose    Collection Time: 09/30/20  8:48 PM   Result Value Ref Range    POC Glucose 231 (H) 60 - 115 mg/dl    Performed on ACCU-CHEK    Basic Metabolic Panel    Collection Time: 10/01/20  5:43 AM   Result Value Ref Range    Sodium 135 135 - 144 mEq/L    Potassium 4.8 3.4 - 4.9 mEq/L    Chloride 98 95 - 107 mEq/L    CO2 27 20 - 31 mEq/L    Anion Gap 10 9 - 15 mEq/L    Glucose 157 (H) 70 - 99 mg/dL    BUN 66 (H) 8 - 23 mg/dL    CREATININE 1.90 (H) 0.50 - 0.90 mg/dL    GFR Non-African American 26.7 (L) >60    GFR  32.3 (L) >60    Calcium 8.7 8.5 - 9.9 mg/dL   POCT Glucose    Collection Time: 10/01/20  6:38 AM   Result Value Ref Range    POC Glucose 161 (H) 60 - 115 mg/dl    Performed on ACCU-CHEK    Basic Metabolic Panel    Collection Time: 10/01/20  9:25 AM   Result Value Ref Range    Sodium 134 (L) 135 - 144 mEq/L    Potassium 4.8 3.4 - 4.9 mEq/L    Chloride 97 95 - 107 mEq/L    CO2 26 20 - 31 mEq/L    Anion Gap 11 9 - 15 mEq/L    Glucose 224 (H) 70 - 99 mg/dL    BUN 65 (H) 8 - 23 mg/dL    CREATININE 1.84 (H) 0.50 - 0.90 mg/dL    GFR Non-African American 27.7 (L) >60    GFR  33.5 (L) >60    Calcium 8.9 8.5 - 9.9 mg/dL   POCT Glucose    Collection Time: 10/01/20 11:01 AM   Result Value Ref Range    POC Glucose 209 (H) 60 - 115 mg/dl    Performed on ACCU-CHEK    POCT Glucose    Collection Time: 10/01/20  3:52 PM   Result Value Ref Range    POC Glucose 192 (H) 60 - 115 mg/dl    Performed on ACCU-CHEK        ECG:     Sinus rhythm, poor R wave ventricle pressure nonspecific interventricular conduction delay. ECHO:    September 16, 2020. LVEF 55 to 60%. 1-2+ MR. Mitral annular calcification. RVSP 44.         Won Alvarado MD  [facility] Cardiologist      Electronically signed on 9/29/20 at 2:54 PM EDT      -----

## 2020-10-01 NOTE — PROGRESS NOTES
Nephrology Progress Note    Assessment:  CKD-3 with RADHA micropoteinuria baseline GFR 40-45  OHDx   DM type-2  Costochondritis agree  COPD JESICA  Obesity  Hypertension  PAD  ** consider Bidil for compliance  (apresoline/NTG)    Plan: follow in office  Watch  not to overdiuresis      REPEAT BMP at noon  Patient Active Problem List:     Severe major depression with psychotic features ([address]     Type 2 diabetes mellitus with hyperglycemia, with long-term current use of insulin ([facility])     HTN (hypertension)     HLD (hyperlipidemia)     Hypothyroid     HF (heart failure) ([facility])     Non-pressure ulcer of toe ([facility])     Atherosclerotic PVD with intermittent claudication ([facility])     Acute osteomyelitis ([address]     Infection of skin     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     Surgical wound dehiscence, initial encounter     S/P amputation of lesser toe, right ([facility])     Rectal bleeding     Athscl native arteries of left leg w ulceration oth prt foot ([facility])     JESICA (obstructive sleep apnea)     Secondary diabetes mellitus ([facility])     Chest pain     Peripheral vascular disease ([facility])     Cellulitis     Syncope and collapse     Severe mitral regurgitation     Ischemic myocardial dysfunction     Pulmonary hypertension ([facility])     Acute on chronic combined systolic and diastolic congestive heart failure ([facility])     Nocturnal hypoxia     RADHA (acute kidney injury) ([address]     Dizziness     Acute cerebrovascular accident ([address]     Abnormal gait     Anxiety     Gastritis, unspecified, without bleeding     Pure hypercholesterolemia     Schizophrenia, unspecified ([address]     CAD in native artery     Extrapyramidal disease     Gangrene of toe of left foot ([facility])     Drug-induced hypotension     Osteomyelitis of right foot ([facility])     Nausea and vomiting     Mild intermittent asthma     Heart failure, diastolic, with acute decompensation ([facility])     Major depressive disorder, single episode, unspecified     Type 2 diabetes mellitus with diabetic neuropathy, unspecified ([facility]      Subjective:  Admit Date: 9/29/2020    Interval History: feeling well  Afebrile  No SOB    Medications:  Scheduled Meds:   famotidine  20 mg Oral Daily    senna  2 tablet Oral Nightly    docusate sodium  100 mg Oral BID    amitriptyline  25 mg Oral Nightly    folic acid  1 mg Oral Daily    prazosin  2 mg Oral Nightly    sodium chloride flush  10 mL Intravenous 2 times per day    atorvastatin  40 mg Oral Nightly    aspirin  325 mg Oral Daily    enoxaparin  40 mg Subcutaneous Daily    nitroglycerin  1 inch Topical 4 times per day    gabapentin  400 mg Oral BID    haloperidol  5 mg Oral Daily    hydrALAZINE  50 mg Oral 3 times per day    insulin glargine  30 Units Subcutaneous Nightly    isosorbide dinitrate  20 mg Oral TID    levothyroxine  50 mcg Oral Daily    metoprolol succinate  50 mg Oral BID    sertraline  200 mg Oral Daily    ticagrelor  90 mg Oral BID    insulin lispro  0-6 Units Subcutaneous TID WC    insulin lispro  0-3 Units Subcutaneous Nightly     Continuous Infusions:   dextrose         CBC:   Recent Labs     09/29/20  1130 09/30/20  0546   WBC 11.6* 11.4*   HGB 11.7* 10.6*    227     CMP:    Recent Labs     09/29/20  1130 09/30/20  0546 10/01/20  0543    134* 135   K 4.0 5.4* 4.8   CL 99 98 98   CO2 28 26 27   BUN 46* 59* 66*   CREATININE 1.30* 1.64* 1.90*   GLUCOSE 72 152* 157*   CALCIUM 9.7 9.2 8.7   LABGLOM 41.4* 31.7* 26.7*     Troponin:   Recent Labs     09/30/20  0546   TROPONINI <0.010     BNP: No results for input(s): BNP in the last 72 hours. INR:   Recent Labs     09/29/20  1130   INR 1.1     Lipids:   Recent Labs     09/30/20  0546   CHOL 139   TRIG 69   HDL 40     Liver:   Recent Labs     09/30/20  0546   AST 28   ALT 27   ALKPHOS 117   PROT 7.1   LABALBU 3.4*   BILITOT 0.5     Iron:  No results for input(s): IRONS, FERRITIN in the last 72 hours.     Invalid input(s): LABIRONS  Urinalysis: No results for input(s): UA in the last 72 hours.    Objective:  Vitals: BP (!) 126/51   Pulse 61   Temp 97.5 °F (36.4 °C) (Oral)   Resp 15   Ht 5' 4\" (1.626 m)   Wt 234 lb 6.4 oz (106.3 kg)   SpO2 95%   BMI 40.23 kg/m²    Wt Readings from Last 3 Encounters:   10/01/20 234 lb 6.4 oz (106.3 kg)   09/21/20 239 lb (108.4 kg)   09/08/20 230 lb 14.4 oz (104.7 kg)      24HR INTAKE/OUTPUT:      Intake/Output Summary (Last 24 hours) at 10/1/2020 0815  Last data filed at 10/1/2020 0513  Gross per 24 hour   Intake 170 ml   Output 550 ml   Net -380 ml       General: alert, in no apparent distress  HEENT: normocephalic, atraumatic, anicteric  Neck: supple, no mass  Lungs: non-labored respirations, clear to auscultation bilaterally  Heart: regular rate and rhythm, 1/6 systolic murmurs or rubs  Abdomen: soft, non-tender, non-distended obese  Ext: no cyanosis, no peripheral edema  Neuro: alert and oriented, no gross abnormalities  Psych: normal mood and affect  Skin: no rash      Electronically signed by Amna Beckwith MD

## 2020-10-01 NOTE — FLOWSHEET NOTE
Pt slept most of the night, pt up with one assist to restroom twice during night. Pt's call light within reach and bed alarm active.

## 2020-10-01 NOTE — PROGRESS NOTES
Hospitalist Progress Note      PCP: Austen Awad    Date of Admission: 9/29/2020    Chief Complaint:    Chief Complaint   Patient presents with    Chest Pain     Subjective:  Patient feels at her baseline today. 12 point ROS negative other than mentioned above     Medications:  Reviewed    Infusion Medications    dextrose       Scheduled Medications    famotidine  20 mg Oral Daily    senna  2 tablet Oral Nightly    docusate sodium  100 mg Oral BID    amitriptyline  25 mg Oral Nightly    folic acid  1 mg Oral Daily    prazosin  2 mg Oral Nightly    sodium chloride flush  10 mL Intravenous 2 times per day    atorvastatin  40 mg Oral Nightly    aspirin  325 mg Oral Daily    enoxaparin  40 mg Subcutaneous Daily    gabapentin  400 mg Oral BID    haloperidol  5 mg Oral Daily    hydrALAZINE  50 mg Oral 3 times per day    insulin glargine  30 Units Subcutaneous Nightly    isosorbide dinitrate  20 mg Oral TID    levothyroxine  50 mcg Oral Daily    metoprolol succinate  50 mg Oral BID    sertraline  200 mg Oral Daily    ticagrelor  90 mg Oral BID    insulin lispro  0-6 Units Subcutaneous TID WC    insulin lispro  0-3 Units Subcutaneous Nightly     PRN Meds: sodium chloride flush, potassium chloride **OR** potassium alternative oral replacement **OR** potassium chloride, acetaminophen **OR** acetaminophen, polyethylene glycol, nitroGLYCERIN, glucose, dextrose, glucagon (rDNA), dextrose, ondansetron      Intake/Output Summary (Last 24 hours) at 10/1/2020 1503  Last data filed at 10/1/2020 1255  Gross per 24 hour   Intake 671 ml   Output 1150 ml   Net -479 ml     Exam:    BP (!) 112/94   Pulse 56   Temp 97.4 °F (36.3 °C) (Oral)   Resp 12   Ht 5' 4\" (1.626 m)   Wt 234 lb 6.4 oz (106.3 kg)   SpO2 96%   BMI 40.23 kg/m²     General appearance: No apparent distress, appears stated age and cooperative. HEENT: Conjunctivae/corneas clear. Neck:  Trachea midline.   Respiratory:  Normal respiratory effort. Clear to auscultation  Cardiovascular: Regular rate and rhythm   Abdomen: Soft, non-tender, non-distended with normal bowel sounds. Musculoskeletal: No clubbing, cyanosis or edema bilaterally. Neuro: Non Focal.   Capillary Refill: Brisk,< 3 seconds   Peripheral Pulses: +2 palpable, equal bilaterally     Labs:   Recent Labs     09/29/20  1130 09/30/20  0546   WBC 11.6* 11.4*   HGB 11.7* 10.6*   HCT 34.6* 32.3*    227     Recent Labs     09/30/20  0546 10/01/20  0543 10/01/20  0925   * 135 134*   K 5.4* 4.8 4.8   CL 98 98 97   CO2 26 27 26   BUN 59* 66* 65*   CREATININE 1.64* 1.90* 1.84*   CALCIUM 9.2 8.7 8.9     Recent Labs     09/29/20  1130 09/30/20  0546   AST 19 28   ALT 22 27   BILITOT 0.4 0.5   ALKPHOS 115 117     Recent Labs     09/29/20  1130   INR 1.1     Recent Labs     09/29/20  1530 09/29/20  2312 09/30/20  0546   TROPONINI <0.010 <0.010 <0.010     Urinalysis:      Lab Results   Component Value Date    NITRU Negative 04/21/2020    BLOODU neg 08/31/2020    BLOODU Negative 04/21/2020    SPECGRAV 1.020 08/31/2020    SPECGRAV 1.015 04/21/2020    GLUCOSEU neg 08/31/2020    GLUCOSEU 250 04/21/2020     Radiology:  US RETROPERITONEAL LIMITED   Final Result      NEGATIVE RENAL ULTRASOUND. XR CHEST PORTABLE   Final Result   No radiographic evidence of acute intrathoracic process.               Assessment/Plan:    #chest pain     - non cardiac    #RADHA on CKD    - Given worsenign trend Nephrology was consulted; will need this trending in the right direction prior to discharge and given that will need more than 2 midnights during this admission    - Management per nephrology?      - Will give gentle hydration pending their advice    #[place]    - no focal deficits; PT/OT    #HTN/HLD/CAD: Continue home meds   #DMII:  Home meds  #Hypothyroidism:  Continue home meds    Active Hospital Problems    Diagnosis Date Noted    RADHA (acute kidney injury) ([facility] [N17.9] 04/26/2020    Chest pain [R07.9] 02/11/2020      Additional work up or/and treatment plan may be added today or then after based on clinical progression. I am managing a portion of pt care. Some medical issues are handled by other specialists. Additional work up and treatment should be done in out pt setting by pt PCP and other out pt providers. In addition to examining and evaluating pt, I spent additional time explaining care, normal and abnormal findings, and treatment plan. All of pt questions were answered. Counseling, diet and education were  provided. Case will be discussed with nursing staff when appropriate. Family will be updated if and when appropriate.       Diet: DIET CARDIAC; No Caffeine    Code Status: Full Code      Electronically signed by Jesse Waggoner MD on 10/1/2020 at 3:03 PM

## 2020-10-02 LAB
ANION GAP SERPL CALCULATED.3IONS-SCNC: 11 MEQ/L (ref 9–15)
BUN BLDV-MCNC: 70 MG/DL (ref 8–23)
CALCIUM SERPL-MCNC: 8.8 MG/DL (ref 8.5–9.9)
CHLORIDE BLD-SCNC: 95 MEQ/L (ref 95–107)
CO2: 26 MEQ/L (ref 20–31)
CREAT SERPL-MCNC: 1.79 MG/DL (ref 0.5–0.9)
GFR AFRICAN AMERICAN: 34.6
GFR NON-AFRICAN AMERICAN: 28.6
GLUCOSE BLD-MCNC: 156 MG/DL (ref 60–115)
GLUCOSE BLD-MCNC: 169 MG/DL (ref 60–115)
GLUCOSE BLD-MCNC: 246 MG/DL (ref 70–99)
GLUCOSE BLD-MCNC: 266 MG/DL (ref 60–115)
GLUCOSE BLD-MCNC: 274 MG/DL (ref 60–115)
PERFORMED ON: ABNORMAL
POTASSIUM SERPL-SCNC: 5.2 MEQ/L (ref 3.4–4.9)
SODIUM BLD-SCNC: 132 MEQ/L (ref 135–144)

## 2020-10-02 PROCEDURE — 80048 BASIC METABOLIC PNL TOTAL CA: CPT

## 2020-10-02 PROCEDURE — 36415 COLL VENOUS BLD VENIPUNCTURE: CPT

## 2020-10-02 PROCEDURE — 2060000000 HC ICU INTERMEDIATE R&B

## 2020-10-02 PROCEDURE — 6370000000 HC RX 637 (ALT 250 FOR IP): Performed by: INTERNAL MEDICINE

## 2020-10-02 PROCEDURE — 2580000003 HC RX 258: Performed by: INTERNAL MEDICINE

## 2020-10-02 PROCEDURE — 6360000002 HC RX W HCPCS: Performed by: INTERNAL MEDICINE

## 2020-10-02 RX ORDER — SODIUM POLYSTYRENE SULFONATE 15 G/60ML
30 SUSPENSION ORAL; RECTAL ONCE
Status: COMPLETED | OUTPATIENT
Start: 2020-10-02 | End: 2020-10-02

## 2020-10-02 RX ADMIN — ENOXAPARIN SODIUM 40 MG: 40 INJECTION SUBCUTANEOUS at 08:31

## 2020-10-02 RX ADMIN — ASPIRIN 325 MG: 325 TABLET, FILM COATED ORAL at 08:31

## 2020-10-02 RX ADMIN — HYDRALAZINE HYDROCHLORIDE 50 MG: 50 TABLET, FILM COATED ORAL at 06:59

## 2020-10-02 RX ADMIN — TICAGRELOR 90 MG: 90 TABLET ORAL at 08:31

## 2020-10-02 RX ADMIN — DOCUSATE SODIUM 100 MG: 100 CAPSULE, LIQUID FILLED ORAL at 08:31

## 2020-10-02 RX ADMIN — Medication 17.2 MG: at 21:37

## 2020-10-02 RX ADMIN — GABAPENTIN 400 MG: 400 CAPSULE ORAL at 21:37

## 2020-10-02 RX ADMIN — SODIUM POLYSTYRENE SULFONATE 30 G: 15 SUSPENSION ORAL; RECTAL at 15:00

## 2020-10-02 RX ADMIN — DOCUSATE SODIUM 100 MG: 100 CAPSULE, LIQUID FILLED ORAL at 21:37

## 2020-10-02 RX ADMIN — GABAPENTIN 400 MG: 400 CAPSULE ORAL at 08:30

## 2020-10-02 RX ADMIN — ISOSORBIDE DINITRATE 20 MG: 10 TABLET ORAL at 21:37

## 2020-10-02 RX ADMIN — HYDRALAZINE HYDROCHLORIDE 50 MG: 50 TABLET, FILM COATED ORAL at 15:00

## 2020-10-02 RX ADMIN — FAMOTIDINE 20 MG: 20 TABLET ORAL at 08:30

## 2020-10-02 RX ADMIN — LEVOTHYROXINE SODIUM 50 MCG: 0.05 TABLET ORAL at 06:59

## 2020-10-02 RX ADMIN — AMITRIPTYLINE HYDROCHLORIDE 25 MG: 25 TABLET, FILM COATED ORAL at 21:37

## 2020-10-02 RX ADMIN — HALOPERIDOL 5 MG: 5 TABLET ORAL at 08:30

## 2020-10-02 RX ADMIN — INSULIN GLARGINE 30 UNITS: 100 INJECTION, SOLUTION SUBCUTANEOUS at 01:20

## 2020-10-02 RX ADMIN — ATORVASTATIN CALCIUM 40 MG: 40 TABLET, FILM COATED ORAL at 21:38

## 2020-10-02 RX ADMIN — Medication 10 ML: at 08:31

## 2020-10-02 RX ADMIN — PRAZOSIN HYDROCHLORIDE 2 MG: 2 CAPSULE ORAL at 21:37

## 2020-10-02 RX ADMIN — ISOSORBIDE DINITRATE 20 MG: 10 TABLET ORAL at 08:30

## 2020-10-02 RX ADMIN — TICAGRELOR 90 MG: 90 TABLET ORAL at 21:38

## 2020-10-02 RX ADMIN — FOLIC ACID 1 MG: 1 TABLET ORAL at 08:31

## 2020-10-02 RX ADMIN — SERTRALINE 200 MG: 100 TABLET, FILM COATED ORAL at 08:30

## 2020-10-02 ASSESSMENT — PAIN DESCRIPTION - PAIN TYPE: TYPE: ACUTE PAIN

## 2020-10-02 ASSESSMENT — PAIN SCALES - GENERAL
PAINLEVEL_OUTOF10: 0
PAINLEVEL_OUTOF10: 0

## 2020-10-02 ASSESSMENT — PAIN DESCRIPTION - LOCATION: LOCATION: CHEST

## 2020-10-02 ASSESSMENT — PAIN DESCRIPTION - ORIENTATION: ORIENTATION: RIGHT;LEFT

## 2020-10-02 NOTE — PROGRESS NOTES
Hospitalist Progress Note      PCP: Siobhan Abarca    Date of Admission: 9/29/2020    Chief Complaint:    Chief Complaint   Patient presents with    Chest Pain     Subjective:  Patient feels ok today no current complaints. 12 point ROS negative other than mentioned above     Medications:  Reviewed    Infusion Medications    dextrose       Scheduled Medications    sodium polystyrene  30 g Oral Once    isosorbide dinitrate  20 mg Oral BID    famotidine  20 mg Oral Daily    senna  2 tablet Oral Nightly    docusate sodium  100 mg Oral BID    amitriptyline  25 mg Oral Nightly    folic acid  1 mg Oral Daily    prazosin  2 mg Oral Nightly    sodium chloride flush  10 mL Intravenous 2 times per day    atorvastatin  40 mg Oral Nightly    aspirin  325 mg Oral Daily    enoxaparin  40 mg Subcutaneous Daily    gabapentin  400 mg Oral BID    haloperidol  5 mg Oral Daily    hydrALAZINE  50 mg Oral 3 times per day    insulin glargine  30 Units Subcutaneous Nightly    levothyroxine  50 mcg Oral Daily    metoprolol succinate  50 mg Oral BID    sertraline  200 mg Oral Daily    ticagrelor  90 mg Oral BID    insulin lispro  0-6 Units Subcutaneous TID WC    insulin lispro  0-3 Units Subcutaneous Nightly     PRN Meds: sodium chloride flush, potassium chloride **OR** potassium alternative oral replacement **OR** potassium chloride, acetaminophen **OR** acetaminophen, polyethylene glycol, nitroGLYCERIN, glucose, dextrose, glucagon (rDNA), dextrose, ondansetron      Intake/Output Summary (Last 24 hours) at 10/2/2020 1412  Last data filed at 10/2/2020 1200  Gross per 24 hour   Intake 840 ml   Output --   Net 840 ml     Exam:    BP (!) 142/59   Pulse 53   Temp 98.1 °F (36.7 °C) (Oral)   Resp 17   Ht 5' 4\" (1.626 m)   Wt 234 lb 6.4 oz (106.3 kg)   SpO2 91%   BMI 40.23 kg/m²     General appearance: No apparent distress, appears stated age and cooperative. HEENT: Conjunctivae/corneas clear.   Neck:  Trachea midline. Respiratory:  Normal respiratory effort. Clear to auscultation  Cardiovascular: Regular rate and rhythm   Abdomen: Soft, non-tender, non-distended with normal bowel sounds. Musculoskeletal: No clubbing, cyanosis or edema bilaterally. Neuro: Non Focal.   Capillary Refill: Brisk,< 3 seconds   Peripheral Pulses: +2 palpable, equal bilaterally     Labs:   Recent Labs     09/30/20  0546   WBC 11.4*   HGB 10.6*   HCT 32.3*        Recent Labs     10/01/20  0543 10/01/20  0925 10/02/20  0847    134* 132*   K 4.8 4.8 5.2*   CL 98 97 95   CO2 27 26 26   BUN 66* 65* 70*   CREATININE 1.90* 1.84* 1.79*   CALCIUM 8.7 8.9 8.8     Recent Labs     09/30/20  0546   AST 28   ALT 27   BILITOT 0.5   ALKPHOS 117     No results for input(s): INR in the last 72 hours. Recent Labs     09/29/20  1530 09/29/20  2312 09/30/20  0546   TROPONINI <0.010 <0.010 <0.010     Urinalysis:      Lab Results   Component Value Date    NITRU Negative 04/21/2020    BLOODU neg 08/31/2020    BLOODU Negative 04/21/2020    SPECGRAV 1.020 08/31/2020    SPECGRAV 1.015 04/21/2020    GLUCOSEU neg 08/31/2020    GLUCOSEU 250 04/21/2020     Radiology:  US RETROPERITONEAL LIMITED   Final Result      NEGATIVE RENAL ULTRASOUND. XR CHEST PORTABLE   Final Result   No radiographic evidence of acute intrathoracic process. Assessment/Plan:    #chest pain     - non cardiac    #RADHA on CKD    - slightly improved; still awiating recommendations; if continues to improve possible d/c tomorrow    #hyperkalemia     - kayexalate    #Weankess    - no focal deficits; PT/OT    #HTN/HLD/CAD: Continue home meds   #DMII:  Home meds  #Hypothyroidism:  Continue home meds    Active Hospital Problems    Diagnosis Date Noted    RADHA [facility]acute kidney [facility]) ([facility] [N17.9] 04/26/2020    Chest pain [R07.9] 02/11/2020      Additional work up or/and treatment plan may be added today or then after based on clinical progression.  I am managing a portion of pt care. Some medical issues are handled by other specialists. Additional work up and treatment should be done in out pt setting by pt PCP and other out pt providers. In addition to examining and evaluating pt, I spent additional time explaining care, normal and abnormal findings, and treatment plan. All of pt questions were answered. Counseling, diet and education were  provided. Case will be discussed with nursing staff when appropriate. Family will be updated if and when appropriate.       Diet: DIET CARDIAC; No Caffeine    Code Status: Full Code      Electronically signed by Yong Miles MD on 10/2/2020 at 2:12 PM

## 2020-10-02 NOTE — PROGRESS NOTES
Nephrology Progress Note    Assessment:  CKD-3  OHDx CAD  DM type-2  COPD  Overweight   Depression  PAD      Plan: await today lab  Follow in office    Denies ant issues in Hungarian  Patient Active Problem List:     Severe major depression with psychotic features ([address]     Type 2 diabetes mellitus with hyperglycemia, with long-term current use of insulin ([facility])     HTN (hypertension)     HLD (hyperlipidemia)     Hypothyroid     HF (heart failure) ([facility])     Non-pressure ulcer of toe ([facility])     Atherosclerotic PVD with intermittent claudication ([facility])     Acute osteomyelitis ([address]     Infection of skin     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     Surgical wound dehiscence, initial encounter     S/P amputation of lesser toe, right ([facility])     Rectal bleeding     Athscl native arteries of left leg w ulceration oth prt foot ([facility])     JESICA (obstructive sleep apnea)     Secondary diabetes mellitus ([facility])     Chest pain     Peripheral vascular disease ([facility])     Cellulitis     Syncope and collapse     Severe mitral regurgitation     Ischemic myocardial dysfunction     Pulmonary hypertension ([facility])     Acute on chronic combined systolic and diastolic congestive heart failure ([facility])     Nocturnal hypoxia     RADHA (acute kidney injury) ([address]     Dizziness     Acute cerebrovascular accident ([address]     Abnormal gait     Anxiety     Gastritis, unspecified, without bleeding     Pure hypercholesterolemia     Schizophrenia, unspecified ([address]     CAD in native artery     Extrapyramidal disease     Gangrene of toe of left foot (HCC)     Drug-induced hypotension     Osteomyelitis of right foot ([facility])     Nausea and vomiting     Mild intermittent asthma     Heart failure, diastolic, with acute decompensation ([facility])     Major depressive disorder, single episode, unspecified     Type 2 diabetes mellitus with diabetic neuropathy, unspecified ([address]      Subjective:  Admit Date: 9/29/2020    Interval History: no real issues     Medications:  Scheduled Meds:   isosorbide dinitrate  20 mg Oral BID    famotidine  20 mg Oral Daily    senna  2 tablet Oral Nightly    docusate sodium  100 mg Oral BID    amitriptyline  25 mg Oral Nightly    folic acid  1 mg Oral Daily    prazosin  2 mg Oral Nightly    sodium chloride flush  10 mL Intravenous 2 times per day    atorvastatin  40 mg Oral Nightly    aspirin  325 mg Oral Daily    enoxaparin  40 mg Subcutaneous Daily    gabapentin  400 mg Oral BID    haloperidol  5 mg Oral Daily    hydrALAZINE  50 mg Oral 3 times per day    insulin glargine  30 Units Subcutaneous Nightly    levothyroxine  50 mcg Oral Daily    metoprolol succinate  50 mg Oral BID    sertraline  200 mg Oral Daily    ticagrelor  90 mg Oral BID    insulin lispro  0-6 Units Subcutaneous TID WC    insulin lispro  0-3 Units Subcutaneous Nightly     Continuous Infusions:   dextrose         CBC:   Recent Labs     09/29/20  1130 09/30/20  0546   WBC 11.6* 11.4*   HGB 11.7* 10.6*    227     CMP:    Recent Labs     09/30/20  0546 10/01/20  0543 10/01/20  0925   * 135 134*   K 5.4* 4.8 4.8   CL 98 98 97   CO2 26 27 26   BUN 59* 66* 65*   CREATININE 1.64* 1.90* 1.84*   GLUCOSE 152* 157* 224*   CALCIUM 9.2 8.7 8.9   LABGLOM 31.7* 26.7* 27.7*     Troponin:   Recent Labs     09/30/20  0546   TROPONINI <0.010     BNP: No results for input(s): BNP in the last 72 hours. INR:   Recent Labs     09/29/20  1130   INR 1.1     Lipids:   Recent Labs     09/30/20  0546   CHOL 139   TRIG 69   HDL 40     Liver:   Recent Labs     09/30/20  0546   AST 28   ALT 27   ALKPHOS 117   PROT 7.1   LABALBU 3.4*   BILITOT 0.5     Iron:  No results for input(s): IRONS, FERRITIN in the last 72 hours. Invalid input(s): LABIRONS  Urinalysis: No results for input(s): UA in the last 72 hours.     Objective:  Vitals: BP (!) 142/59   Pulse 53   Temp 97.5 °F (36.4 °C)   Resp 16   Ht 5' 4\" (1.626 m)   Wt 234 lb 6.4 oz (106.3 kg)   SpO2 91%   BMI 40.23 kg/m²    Wt Readings from Last 3 Encounters:   10/01/20 234 lb 6.4 oz (106.3 kg)   09/21/20 239 lb (108.4 kg)   09/08/20 230 lb 14.4 oz (104.7 kg)      24HR INTAKE/OUTPUT:      Intake/Output Summary (Last 24 hours) at 10/2/2020 0827  Last data filed at 10/1/2020 1655  Gross per 24 hour   Intake 621 ml   Output 600 ml   Net 21 ml       General: alert, in no apparent distress  HEENT: normocephalic, atraumatic, anicteric  Neck: supple, no mass  Lungs: non-labored respirations, clear to auscultation bilaterally  Heart: regular rate and rhythm, no murmurs or rubs  Abdomen: soft, non-tender, non-distended overweight   Ext: no cyanosis, no peripheral edema decreased pulses  Neuro: alert and oriented, no gross abnormalities  Psych: normal mood and affect  Skin: no rash      Electronically signed by Hyacinth Rinne, MD

## 2020-10-02 NOTE — FLOWSHEET NOTE
Pt primarily [address] speaking. [place]  used during AM assessment as well as during med passes.  Electronically signed by Carla Leonard RN on 10/2/2020 at 1:35 PM

## 2020-10-02 NOTE — PROGRESS NOTES
[facility] Progress Note        Date:   10/2/2020    Patient:    Austen Mo    :    1955  CSN:    [id number]    Rounding Cardiologist: Anusha Alford MD     Primary Cardiologist: Felton Vigil MD , [address]  [facility]    Requesting Physician:  Venessa Cosby MD      Reason for Initial Consult:  Chest Pain      Subjective:    No changes overall in symptoms. Renal Insufficiency managed by Dr Hailey Wood. Hyperkalemia mild. 14 system General and Cardiac ROS otherwise negative or unchanged. Assessment:    1. Chest pain, reproducible, atypical, c/w costochondritis, doubt acute coronary syndrome. 2. Shortness of breath  3. Negative troponin, no acute EKG abnormality, doubt ACS. 4. Congestive heart failure, Diastolic and systolic, history, no apparent decompensation currently. 5. ICM, LVEF 45%  6. MR, 3+  7. CAD, Prior CABG, Status post PCI / stent of the ostial circumflex 20.   8. HTN  9. HLP  10. DM.  11. PHTN  12. Renal insufficiency  13. Anemia  14. PVOD bilateral Legs, Post revascularization [facility] prior. 15. Probable JESICA    Plan:    1. Cardiac Supportive Care  2. Doubt primary cardiovascular problems this admission. Most likely costochondritis recurrence. 3. Primary hospital, nephrology and pulmonary care as warranted. 4. Continue current medications. 5. Okay to discharge home from cardiovascular standpoint once okay with others. 6. Follow-up with [facility] as scheduled, earlier if need be.  7. Cardiology sign off.  8. See Orders        HISTORY OF PRESENT ILLNESS:      Austen Mo is a pleasant 60 y.o. female with the above past history including multiple admissions for atypical chest pain thought to be costochondritis and known coronary artery disease post coronary bypass surgery  with recent left circumflex angioplasty 2020. She does have ischemic cardiomyopathy ejection fraction 50 to 60% by echocardiogram 2020.   She has a history of hypertension hyperlipidemia and diabetes as well as hypothyroidism. She does have peripheral vascular disease post prior right bypass . She has a history of schizoaffective psychosis. She now re-presents with similar chest discomfort symptoms. These of chest discomfort symptoms are reproducible on palpation as prior admissions. Initial troponins have been negative. Cardiology was asked to see for further evaluation and treatment given her past history. Patient Follows with Nico Norris MD.  [facility]. Patient History and Records, EMR reviewed. Patient Interviewed and examined. Fair historian. Broken English.  is present for translation. Other than above she has no other complaints. Denies SOB, LH, Dizziness, TIA or CVA Symptoms. No Orthopnea, Edema or CHF symptoms. No Palpitations. No Syncope. No Fever, Chills or Cold symptoms. No GI,  or Bleeding complaints. Cardiac and general ROS otherwise negative. [address] otherwise negative other than noted. Past Medical History:   Diagnosis Date    Asthma     CAD (coronary artery disease)     CKD (chronic kidney disease) stage 3, GFR 30-59 ml/min ([facility])     Colitis     Diabetes mellitus ([address]     Hyperlipidemia     Hypertension     PAD (peripheral artery disease) ([facility]     Prolonged emergence from general anesthesia     P[facility]ipheral [facility]) [facility]     Thyroid disease        Past Surgical History:   Procedure Laterality Date    CARDIAC SURGERY       SECTION      COLONOSCOPY N/A 2019    COLONOSCOPY DIAGNOSTIC performed by Marjorie Mcdaniel MD at [address] [place] [address] GRAFT  2019    unknown vessels    HYSTERECTOMY      TOE AMPUTATION Right     3rd toe       Prior to Admission medications    Medication Sig Start Date End Date Taking?  Authorizing Provider   prazosin (MINIPRESS) 2 MG capsule Take 2 mg by mouth nightly  20  Yes Historical Provider, MD   sertraline (ZOLOFT) 100 MG tablet Take 200 mg by mouth daily  9/14/20  Yes Historical Provider, MD   levothyroxine (SYNTHROID) 50 MCG tablet Take 1 tablet by mouth Daily 9/21/20  Yes Jose Roman MD   insulin lispro, 1 Unit Dial, (HUMALOG KWIKPEN) 100 UNIT/ML SOPN 10 units at each meals 9/21/20  Yes Jose Roman MD   montelukast (SINGULAIR) 10 MG tablet take 1 tablet by mouth at bedtime 9/18/20  Yes Kathleen Cabrera MD   furosemide (LASIX) 40 MG tablet Take 1 tablet by mouth daily 9/4/20  Yes Elo Miner MD   magnesium oxide (MAG-OX) 400 MG tablet Take 1 tablet by mouth daily 9/4/20  Yes Elo Miner MD   ticagrelor (BRILINTA) 90 MG TABS tablet Take 90 mg by mouth 2 times daily   Yes Historical Provider, MD   CPAP Machine MISC by Does not apply route New CPAP with 10 cm 8/20/20  Yes Kathleen Cabrera MD   aspirin 81 MG EC tablet Take 1 tablet by mouth daily 6/30/20  Yes Cee Epperson MD   atorvastatin (LIPITOR) 80 MG tablet Take 1 tablet by mouth nightly 6/29/20  Yes Cee Epperson MD   OXYGEN 2 lit O2 with sleep , please give O2 concentrator 6/12/20  Yes Kathleen Cabrera MD   Emollient (EUCERIN INTENSIVE REPAIR HAND) 2.5-10 % CREA APPLY TO FEET AT BEDTIME; PLACE SOCKS OVER FEET AFTER APPLICATION IF NEEDED FOR DRY CRACKING FEET 3/15/20  Yes Historical Provider, MD   hydrOXYzine (VISTARIL) 25 MG capsule Take 50 mg by mouth 3 times daily as needed  3/4/20  Yes Historical Provider, MD   insulin glargine (LANTUS) 100 UNIT/ML injection vial Inject 30 Units into the skin nightly  Patient taking differently: Inject 35 Units into the skin nightly  5/12/20  Yes Sharmin Estrada MD   isosorbide dinitrate (ISORDIL) 20 MG tablet Take 1 tablet by mouth 3 times daily 4/28/20  Yes Cee Epperson MD   hydrALAZINE (APRESOLINE) 50 MG tablet Take 1 tablet by mouth every 8 hours 4/28/20  Yes Cee Epperson MD   metoprolol succinate (TOPROL XL) 50 MG extended release tablet Take 1 tablet by mouth 2 times daily 4/28/20  Yes Nilay Townsend MD   amitriptyline (ELAVIL) 25 MG tablet Take 25 mg by mouth nightly   Yes Historical Provider, MD   albuterol sulfate HFA (VENTOLIN HFA) 108 (90 Base) MCG/ACT inhaler Inhale 2 puffs into the lungs as needed for Wheezing Every 4-6 hours PRN   Yes Historical Provider, MD   acetaminophen (TYLENOL) 500 MG tablet Take 500 mg by mouth 3 times daily Take with Tramadol   Yes Historical Provider, MD   gabapentin (NEURONTIN) 400 MG capsule Take 400 mg by mouth 2 times daily. AM and 800 mg in pm-9pm   Yes Historical Provider, MD   haloperidol (HALDOL) 5 MG tablet Take 5 mg by mouth daily   Yes Historical Provider, MD   folic acid (FOLVITE) 1 MG tablet Take 1 mg by mouth daily   Yes Historical Provider, MD   Iron Polysacch Hxenj-M30-PD (NIFEREX-150 FORTE PO) Take 1 tablet by mouth daily    Yes Historical Provider, MD   Cyanocobalamin (VITAMIN B-12) 1000 MCG extended release tablet Take 1,000 mcg by mouth daily   Yes Historical Provider, MD   meclizine (ANTIVERT) 25 MG tablet Take 25 mg by mouth three times daily   Yes Historical Provider, MD   Alcohol Swabs (ALCOHOL PREP) 70 % PADS qid 9/21/20   R Herminio Alcantar MD   Continuous Blood Gluc Sensor (FREESTYLE FELY 14 DAY SENSOR) MISC Every 2 weeks 9/21/20   TREVOR Alcantar MD   Continuous Blood Gluc  (FREESTYLE FELY 14 DAY READER) CATHLEEN As directed 9/21/20   TREVOR Alcantar MD   Insulin Pen Needle (NOVOFINE) 32G X 6 MM MISC qid 9/21/20   TREVOR Alcantar MD   Nutritional Supplements ([address]) LIQD take as directed three times a day 6/1/20   Historical Provider, MD   nitroGLYCERIN (NITROSTAT) 0.4 MG SL tablet up to max of 3 total doses. If no relief after 1 dose, call 911. 4/28/20   Nilay Townsend MD   FreeStyle Lancets MISC 1 each by Does not apply route daily 1/30/20   TREVOR Alcantar MD   blood glucose test strips (FREESTYLE LITE) strip 1 each by In Vitro route daily As needed.  1/30/20   TREVOR Alcantar MD       Scheduled Meds:   isosorbide dinitrate  20 mg Oral BID    famotidine  20 mg Oral Daily    senna  2 tablet Oral Nightly    docusate sodium  100 mg Oral BID    amitriptyline  25 mg Oral Nightly    folic acid  1 mg Oral Daily    prazosin  2 mg Oral Nightly    sodium chloride flush  10 mL Intravenous 2 times per day    atorvastatin  40 mg Oral Nightly    aspirin  325 mg Oral Daily    enoxaparin  40 mg Subcutaneous Daily    gabapentin  400 mg Oral BID    haloperidol  5 mg Oral Daily    hydrALAZINE  50 mg Oral 3 times per day    insulin glargine  30 Units Subcutaneous Nightly    levothyroxine  50 mcg Oral Daily    metoprolol succinate  50 mg Oral BID    sertraline  200 mg Oral Daily    ticagrelor  90 mg Oral BID    insulin lispro  0-6 Units Subcutaneous TID WC    insulin lispro  0-3 Units Subcutaneous Nightly     Continuous Infusions:   dextrose       PRN Meds:sodium chloride flush, potassium chloride **OR** potassium alternative oral replacement **OR** potassium chloride, acetaminophen **OR** acetaminophen, polyethylene glycol, nitroGLYCERIN, glucose, dextrose, glucagon (rDNA), dextrose, ondansetron    Allergies   Allergen Reactions    Ambien [Zolpidem Tartrate]     Capoten [Captopril]     Clioquinol     Cogentin [Benztropine]     Depakote [Divalproex Sodium]     Effexor Xr [Venlafaxine Hcl Er]     Geodon [Ziprasidone Hcl]     Lisinopril      Hyperkalemia: 4/21/20 potassium was 6.7    Lyrica [Pregabalin]     Navane [Thiothixene]     Pamelor [Nortriptyline Hcl]     Remeron [Mirtazapine]     Risperdal [Risperidone]     Trazodone And Nefazodone     Wellbutrin [Bupropion]        Social History     Socioeconomic History    Marital status:      Spouse name: Not on file    Number of children: Not on file    Years of education: Not on file    Highest education level: Not on file   Occupational History    Not on file   Social Needs    Financial resource strain: Not on file   [place] insecurity     Worry: Not on file     Inability: Not on file    Transportation needs     Medical: Not on file     Non-medical: Not on file   Tobacco Use    Smoking status: Never Smoker    Smokeless tobacco: Never Used   Substance and Sexual Activity    Alcohol use: Never     Frequency: Never    Drug use: Never    Sexual activity: Not on file   Lifestyle    Physical activity     Days per week: Not on file     Minutes per session: Not on file    Stress: Not on file   Relationships    Social connections     Talks on phone: Not on file     Gets together: Not on file     Attends Congregational service: Not on file     Active member of club or organization: Not on file     Attends meetings of clubs or organizations: Not on file     Relationship status: Not on file    Intimate partner violence     Fear of current or ex partner: Not on file     Emotionally abused: Not on file     Physically abused: Not on file     Forced sexual activity: Not on file   Other Topics Concern    Not on file   Social History Narrative         Lives With: Spouse    Type of Home: [address] [place] [address] [address]: One level    Home Access: Elevator    Bathroom Shower/Tub: Tub/Shower unit(Simultaneous filing. User may not have seen previous data.)    Bathroom Equipment: Grab bars in shower, Shower chair    Home Equipment: Rolling walker, [address] 450 bed    ADL Assistance: Needs assistance    Homemaking Assistance: Needs assistance    Homemaking Responsibilities: No    Ambulation Assistance: Independent    Transfer Assistance: Independent    Active : No    Additional Comments: Pt wears orthopedic shoes at baseline    The patient states she is current with [facility](RN per the ). The patient had a walker, but obtained a hospital bed, wheel chair, BSC and O2 last admission (from [address] pharmacy is [address] [place] [address],[address] [address]., [place].       Transportation is mEq/L    Anion Gap 11 9 - 15 mEq/L    Glucose 246 (H) 70 - 99 mg/dL    BUN 70 (H) 8 - 23 mg/dL    CREATININE 1.79 (H) 0.50 - 0.90 mg/dL    GFR Non-African American 28.6 (L) >60    GFR  34.6 (L) >60    Calcium 8.8 8.5 - 9.9 mg/dL   POCT Glucose    Collection Time: 10/02/20 11:14 AM   Result Value Ref Range    POC Glucose 274 (H) 60 - 115 mg/dl    Performed on ACCU-CHEK        ECG:     Sinus rhythm, poor R wave ventricle pressure nonspecific interventricular conduction delay. ECHO:    September 16, 2020. LVEF 55 to 60%. 1-2+ MR. Mitral annular calcification. RVSP 44.         Mattie Severe, MD  [facility] Cardiologist      Electronically signed on 9/29/20 at 2:54 PM EDT      -----

## 2020-10-03 VITALS
OXYGEN SATURATION: 95 % | TEMPERATURE: 97.5 F | DIASTOLIC BLOOD PRESSURE: 44 MMHG | HEIGHT: 64 IN | BODY MASS INDEX: 40.02 KG/M2 | RESPIRATION RATE: 18 BRPM | HEART RATE: 65 BPM | SYSTOLIC BLOOD PRESSURE: 132 MMHG | WEIGHT: 234.4 LBS

## 2020-10-03 LAB
ANION GAP SERPL CALCULATED.3IONS-SCNC: 6 MEQ/L (ref 9–15)
BUN BLDV-MCNC: 55 MG/DL (ref 8–23)
CALCIUM SERPL-MCNC: 8.5 MG/DL (ref 8.5–9.9)
CHLORIDE BLD-SCNC: 95 MEQ/L (ref 95–107)
CO2: 28 MEQ/L (ref 20–31)
CREAT SERPL-MCNC: 1.35 MG/DL (ref 0.5–0.9)
GFR AFRICAN AMERICAN: 47.9
GFR NON-AFRICAN AMERICAN: 39.6
GLUCOSE BLD-MCNC: 145 MG/DL (ref 60–115)
GLUCOSE BLD-MCNC: 211 MG/DL (ref 70–99)
GLUCOSE BLD-MCNC: 301 MG/DL (ref 60–115)
PERFORMED ON: ABNORMAL
PERFORMED ON: ABNORMAL
POTASSIUM SERPL-SCNC: 4.6 MEQ/L (ref 3.4–4.9)
SODIUM BLD-SCNC: 129 MEQ/L (ref 135–144)

## 2020-10-03 PROCEDURE — 6360000002 HC RX W HCPCS: Performed by: INTERNAL MEDICINE

## 2020-10-03 PROCEDURE — 36415 COLL VENOUS BLD VENIPUNCTURE: CPT

## 2020-10-03 PROCEDURE — 80048 BASIC METABOLIC PNL TOTAL CA: CPT

## 2020-10-03 PROCEDURE — 6370000000 HC RX 637 (ALT 250 FOR IP): Performed by: INTERNAL MEDICINE

## 2020-10-03 PROCEDURE — 2580000003 HC RX 258: Performed by: INTERNAL MEDICINE

## 2020-10-03 RX ORDER — PSEUDOEPHEDRINE HCL 30 MG
100 TABLET ORAL 2 TIMES DAILY
Qty: 60 CAPSULE | Refills: 1 | Status: SHIPPED | OUTPATIENT
Start: 2020-10-03

## 2020-10-03 RX ORDER — SENNA PLUS 8.6 MG/1
2 TABLET ORAL NIGHTLY
Qty: 60 TABLET | Refills: 0 | Status: SHIPPED | OUTPATIENT
Start: 2020-10-03 | End: 2020-11-02

## 2020-10-03 RX ADMIN — HYDRALAZINE HYDROCHLORIDE 50 MG: 50 TABLET, FILM COATED ORAL at 06:21

## 2020-10-03 RX ADMIN — TICAGRELOR 90 MG: 90 TABLET ORAL at 08:36

## 2020-10-03 RX ADMIN — HALOPERIDOL 5 MG: 5 TABLET ORAL at 08:36

## 2020-10-03 RX ADMIN — LEVOTHYROXINE SODIUM 50 MCG: 0.05 TABLET ORAL at 06:21

## 2020-10-03 RX ADMIN — SERTRALINE 200 MG: 100 TABLET, FILM COATED ORAL at 08:36

## 2020-10-03 RX ADMIN — Medication 10 ML: at 00:14

## 2020-10-03 RX ADMIN — INSULIN GLARGINE 30 UNITS: 100 INJECTION, SOLUTION SUBCUTANEOUS at 00:05

## 2020-10-03 RX ADMIN — DOCUSATE SODIUM 100 MG: 100 CAPSULE, LIQUID FILLED ORAL at 08:35

## 2020-10-03 RX ADMIN — HYDRALAZINE HYDROCHLORIDE 50 MG: 50 TABLET, FILM COATED ORAL at 00:14

## 2020-10-03 RX ADMIN — FOLIC ACID 1 MG: 1 TABLET ORAL at 08:36

## 2020-10-03 RX ADMIN — ASPIRIN 325 MG: 325 TABLET, FILM COATED ORAL at 08:36

## 2020-10-03 RX ADMIN — FAMOTIDINE 20 MG: 20 TABLET ORAL at 08:36

## 2020-10-03 RX ADMIN — ISOSORBIDE DINITRATE 20 MG: 10 TABLET ORAL at 08:36

## 2020-10-03 RX ADMIN — ENOXAPARIN SODIUM 40 MG: 40 INJECTION SUBCUTANEOUS at 08:35

## 2020-10-03 RX ADMIN — GABAPENTIN 400 MG: 400 CAPSULE ORAL at 08:36

## 2020-10-03 RX ADMIN — METOPROLOL SUCCINATE 50 MG: 50 TABLET, EXTENDED RELEASE ORAL at 08:36

## 2020-10-03 RX ADMIN — Medication 10 ML: at 08:35

## 2020-10-03 ASSESSMENT — PAIN SCALES - GENERAL
PAINLEVEL_OUTOF10: 0

## 2020-10-03 NOTE — PROGRESS NOTES
Pt.GIVEN D/C INSTRUCTIONS VIA  VIDEO TRANSLATION -  ID [id number]. PT .HAD NO FURTHER QUESTIONS.  CALLED ON WAY.

## 2020-10-03 NOTE — DISCHARGE SUMMARY
Hospital Medicine Discharge Summary    Millie Yeung  :  1955  MRN:  [id number]    Admit date:  2020  Discharge date:  10/3/2020    Admitting Physician:  Tatiana Ruvalcaba MD  Primary Care Physician:  Parul Tyler      Discharge Diagnoses: RADHA on CKD    Chief Complaint   Patient presents with    Chest Pain     Hospital Course:   Patient presented with pleuritic chest pain but was found to have an RADHA on CKD. Held her diuretics with improvement; on discharge her renal function has improved but had a slight hyponatremia; neprhology recommended discharge with outpatient follow up and has made diuretic recommendations. Exam on discharge:   BP (!) 132/44   Pulse 65   Temp 97.5 °F (36.4 °C) (Oral)   Resp 18   Ht 5' 4\" (1.626 m)   Wt 234 lb 6.4 oz (106.3 kg)   SpO2 95%   BMI 40.23 kg/m²   General appearance: No apparent distress, appears stated age and cooperative. HEENT: Conjunctivae/corneas clear. Neck:  Trachea midline. Respiratory:  Normal respiratory effort. Clear to auscultation  Cardiovascular: Regular rate and rhythm   Abdomen: Soft, non-tender, non-distended with normal bowel sounds. Musculoskeletal: trace edema  Neuro: Non Focal.  Capillary Refill: Brisk,< 3 seconds   Peripheral Pulses: +2 palpable, equal bilaterally     Patient was seen by the following consultants while admitted to [facility]:   Consults:  [address] TO CARDIOLOGY  IP CONSULT TO NEPHROLOGY    Significant Diagnostic Studies:    Refer to chart     Please refer to chart if no studies are shown here    Xr Chest Portable    Result Date: 2020  Exam: XR CHEST PORTABLE History:  Chest pain Technique: AP portable view of the chest obtained. Comparison: Chest x-ray from 2020   Findings: There is no change in the median sternotomy. The cardiac silhouette is at the upper limits of normal in size. There are no infiltrates, consolidations or effusions.  Bones of the thorax appear intact. No radiographic evidence of acute intrathoracic process. Discharge Medications:       Sachin Mcneill 115 Medication Instructions MTP:[id number]    Printed on:10/03/20 1333   Medication Information                      acetaminophen (TYLENOL) 500 MG tablet  Take 500 mg by mouth 3 times daily Take with Tramadol             albuterol sulfate HFA (VENTOLIN HFA) 108 (90 Base) MCG/ACT inhaler  Inhale 2 puffs into the lungs as needed for Wheezing Every 4-6 hours PRN             Alcohol Swabs (ALCOHOL PREP) 70 % PADS  qid             amitriptyline (ELAVIL) 25 MG tablet  Take 25 mg by mouth nightly             aspirin 81 MG EC tablet  Take 1 tablet by mouth daily             atorvastatin (LIPITOR) 80 MG tablet  Take 1 tablet by mouth nightly             blood glucose test strips (FREESTYLE LITE) strip  1 each by In Vitro route daily As needed. Continuous Blood Gluc  (FREESTYLE FELY 14 DAY READER) CATHLEEN  As directed             Continuous Blood Gluc Sensor (FREESTYLE FELY 14 DAY SENSOR) MISC  Every 2 weeks             CPAP Machine MISC  by Does not apply route New CPAP with 10 cm             Cyanocobalamin (VITAMIN B-12) 1000 MCG extended release tablet  Take 1,000 mcg by mouth daily             docusate sodium (COLACE, DULCOLAX) 100 MG CAPS  Take 100 mg by mouth 2 times daily             Emollient (EUCERIN INTENSIVE REPAIR HAND) 2.5-10 % CREA  APPLY TO FEET AT BEDTIME; PLACE SOCKS OVER FEET AFTER APPLICATION IF NEEDED FOR DRY CRACKING FEET             folic acid (FOLVITE) 1 MG tablet  Take 1 mg by mouth daily             FreeStyle Lancets MISC  1 each by Does not apply route daily             furosemide (LASIX) 40 MG tablet  Take 1 tablet by mouth daily             gabapentin (NEURONTIN) 400 MG capsule  Take 400 mg by mouth 2 times daily.  AM and 800 mg in pm-9pm             haloperidol (HALDOL) 5 MG tablet  Take 5 mg by mouth daily hydrALAZINE (APRESOLINE) 50 MG tablet  Take 1 tablet by mouth every 8 hours             hydrOXYzine (VISTARIL) 25 MG capsule  Take 50 mg by mouth 3 times daily as needed              insulin glargine (LANTUS) 100 UNIT/ML injection vial  Inject 30 Units into the skin nightly             insulin lispro, 1 Unit Dial, (HUMALOG KWIKPEN) 100 UNIT/ML SOPN  10 units at each meals             Insulin Pen Needle (NOVOFINE) 32G X 6 MM MISC  qid             Iron Polysacch Jkafe-O67-CX (NIFEREX-150 FORTE PO)  Take 1 tablet by mouth daily              isosorbide dinitrate (ISORDIL) 20 MG tablet  Take 1 tablet by mouth 3 times daily             levothyroxine (SYNTHROID) 50 MCG tablet  Take 1 tablet by mouth Daily             magnesium oxide (MAG-OX) 400 MG tablet  Take 1 tablet by mouth daily             meclizine (ANTIVERT) 25 MG tablet  Take 25 mg by mouth three times daily             metoprolol succinate (TOPROL XL) 50 MG extended release tablet  Take 1 tablet by mouth 2 times daily             montelukast (SINGULAIR) 10 MG tablet  take 1 tablet by mouth at bedtime             nitroGLYCERIN (NITROSTAT) 0.4 MG SL tablet  up to max of 3 total doses. If no relief after 1 dose, call 911. Nutritional Supplements ([address]) LIQD  take as directed three times a day             OXYGEN  2 lit O2 with sleep , please give O2 concentrator             prazosin (MINIPRESS) 2 MG capsule  Take 2 mg by mouth nightly              senna (SENOKOT) 8.6 MG tablet  Take 2 tablets by mouth nightly             sertraline (ZOLOFT) 100 MG tablet  Take 200 mg by mouth daily              ticagrelor (BRILINTA) 90 MG TABS tablet  Take 90 mg by mouth 2 times daily                 Disposition:   If discharged to Home, Any [facility] needs that were indicated and/or required as been addressed and set up by Social Work. Condition at discharge: good     Activity: activity as tolerated    Total time taken for discharging this patient: 40 minutes. Greater than 70% of time was spent focused exclusively on this patient. Time was taken to review chart, discuss plans with consultants, reconciling medications, discussing plan answering questions with patient.      PeggyAnthonytracekimgeorgina Person  10/3/2020, 1:33 PM  ----------------------------------------------------------------------------------------------------------------------    Cassie Castro,

## 2020-10-03 NOTE — PROGRESS NOTES
Nephrology Progress Note    Assessment:  CKD-3  OHDx HFrEF  DM type-2  Hypertension  Hyperlipidmia  PAD  Anemia      Plan: no change in meds.  until BMP back from today lab work  Hopefully home soon  ** continue Lasix 40mg qd at time of discharge   Patient Active Problem List:     Severe major depression with psychotic features ([address]     Type 2 diabetes mellitus with hyperglycemia, with long-term current use of insulin ([facility])     HTN (hypertension)     HLD (hyperlipidemia)     Hypothyroid     HF (heart failure) ([facility])     Non-pressure ulcer of toe ([facility])     Atherosclerotic PVD with intermittent claudication ([facility])     Acute osteomyelitis ([address]     Infection of skin     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     Surgical wound dehiscence, initial encounter     S/P amputation of lesser toe, right ([facility])     Rectal bleeding     Athscl native arteries of left leg w ulceration oth prt foot ([facility])     JESICA (obstructive sleep apnea)     Secondary diabetes mellitus ([facility])     Chest pain     Peripheral vascular disease ([facility])     Cellulitis     Syncope and collapse     Severe mitral regurgitation     Ischemic myocardial dysfunction     Pulmonary hypertension ([facility])     Acute on chronic combined systolic and diastolic congestive heart failure ([facility])     Nocturnal hypoxia     RADHA (acute kidney injury) ([address]     Dizziness     Acute cerebrovascular accident ([address]     Abnormal gait     Anxiety     Gastritis, unspecified, without bleeding     Pure hypercholesterolemia     Schizophrenia, unspecified ([address]     CAD in native artery     Extrapyramidal disease     Gangrene of toe of left foot (HCC)     Drug-induced hypotension     Osteomyelitis of right foot ([facility])     Nausea and vomiting     Mild intermittent asthma     Heart failure, diastolic, with acute decompensation ([facility])     Major depressive disorder, single episode, unspecified     Type 2 diabetes mellitus with diabetic neuropathy, unspecified ([address]      Subjective:  Admit Date: 9/29/2020    Interval History: Breathing fine  No cough     Medications:  Scheduled Meds:   isosorbide dinitrate  20 mg Oral BID    famotidine  20 mg Oral Daily    senna  2 tablet Oral Nightly    docusate sodium  100 mg Oral BID    amitriptyline  25 mg Oral Nightly    folic acid  1 mg Oral Daily    prazosin  2 mg Oral Nightly    sodium chloride flush  10 mL Intravenous 2 times per day    atorvastatin  40 mg Oral Nightly    aspirin  325 mg Oral Daily    enoxaparin  40 mg Subcutaneous Daily    gabapentin  400 mg Oral BID    haloperidol  5 mg Oral Daily    hydrALAZINE  50 mg Oral 3 times per day    insulin glargine  30 Units Subcutaneous Nightly    levothyroxine  50 mcg Oral Daily    metoprolol succinate  50 mg Oral BID    sertraline  200 mg Oral Daily    ticagrelor  90 mg Oral BID    insulin lispro  0-6 Units Subcutaneous TID WC    insulin lispro  0-3 Units Subcutaneous Nightly     Continuous Infusions:   dextrose         CBC: No results for input(s): WBC, HGB, PLT in the last 72 hours. CMP:    Recent Labs     10/01/20  0543 10/01/20  0925 10/02/20  0847    134* 132*   K 4.8 4.8 5.2*   CL 98 97 95   CO2 27 26 26   BUN 66* 65* 70*   CREATININE 1.90* 1.84* 1.79*   GLUCOSE 157* 224* 246*   CALCIUM 8.7 8.9 8.8   LABGLOM 26.7* 27.7* 28.6*     Troponin: No results for input(s): TROPONINI in the last 72 hours. BNP: No results for input(s): BNP in the last 72 hours. INR: No results for input(s): INR in the last 72 hours. Lipids: No results for input(s): CHOL, LDLDIRECT, TRIG, HDL, AMYLASE, LIPASE in the last 72 hours. Liver: No results for input(s): AST, ALT, ALKPHOS, PROT, LABALBU, BILITOT in the last 72 hours. Invalid input(s): BILDIR  Iron:  No results for input(s): IRONS, FERRITIN in the last 72 hours. Invalid input(s): LABIRONS  Urinalysis: No results for input(s): UA in the last 72 hours.     Objective:  Vitals: BP (!) 132/44   Pulse 65   Temp 97.5 °F (36.4 °C) (Oral)   Resp 18 Ht 5' 4\" (1.626 m)   Wt 234 lb 6.4 oz (106.3 kg)   SpO2 95%   BMI 40.23 kg/m²    Wt Readings from Last 3 Encounters:   10/01/20 234 lb 6.4 oz (106.3 kg)   09/21/20 239 lb (108.4 kg)   09/08/20 230 lb 14.4 oz (104.7 kg)      24HR INTAKE/OUTPUT:      Intake/Output Summary (Last 24 hours) at 10/3/2020 9443  Last data filed at 10/3/2020 0835  Gross per 24 hour   Intake 485 ml   Output --   Net 485 ml       General: alert, in no apparent distress  HEENT: normocephalic, atraumatic, anicteric  Neck: supple, no mass  Lungs: non-labored respirations, clear to auscultation bilaterally no wheezing  Heart: regular rate and rhythm, no murmurs or rubs  Abdomen: soft, non-tender, non-distended  Ext: no cyanosis, no peripheral edema  Neuro: alert and oriented, no gross abnormalities  Psych: normal mood and affect  Skin: no rash      Electronically signed by Breanne Chambers MD

## 2020-10-08 NOTE — ADT AUTH CERT
Utilization Reviews         Chest Pain - Care Day 4 (10/2/2020) by Elizabeth Krishnan RN         Review Status  Review Entered    Completed  10/8/2020 10:27        Criteria Review       Care Day: 4 Care Date: 10/2/2020 Level of Care:    Guideline Day 2    Level Of Care    ( ) ICU, intermediate care, or telemetry to discharge    10/8/2020 10:27 AM EDT by Binu Cedeno      tele unit - ongoing lab monitoring - recommendations per consults    ( ) Complete discharge planning    10/8/2020 10:27 AM EDT by Binu Cedeno      per CM - patient plans to return home w/ HHC    Clinical Status    (X) * Hemodynamic stability    10/8/2020 10:27 AM EDT by Binu Cedeno      124/66, 53, 17, 97.7, 100% on RA    (X) * Acute coronary syndrome ruled out    10/8/2020 10:27 AM EDT by Binu Cedeno      n/a per Cardiology    (X) * Pain absent or managed    10/8/2020 10:27 AM EDT by Binu Cedeno      absent    (X) * Dangerous arrhythmia absent    10/8/2020 10:27 AM EDT by Binu Cedeno      SB per vitals assessment - none documented    (X) * No identification of etiology of pain requiring inpatient care    ( ) * Discharge plans and education understood    Activity    (X) * Ambulatory or acceptable for next level of care    10/8/2020 10:27 AM EDT by Binu Cedeno      up as tolerated    Routes    (X) * Oral hydration, medications, and diet    10/8/2020 10:27 AM EDT by Binu Cedeno      diet and po meds ordered    Medications    (X) Possible aspirin or cardiac medications    10/8/2020 10:27 AM EDT by Binu Cedeno      Aspirin 325 mg po daily, Lipitor 40 mg po daily, Lovenox 40 mg SC daily, Apresoline 50 mg po TID, Isordil 20 mg po BID, toprol XL 50 mg po BID - doses held, mINIPRESS 2 MG PO daily, Brlinta 90 mg po BID    (X) Discontinue IV medications    10/8/2020 10:27 AM EDT by Binu Cedeno      NS @ 50 ml/hr - d/c'd @ 7838    * Milestone    Additional Notes    10/2/2020 Care day 4       Physical Exam    General appearance: No apparent distress, appears stated age and cooperative. HEENT: Conjunctivae/corneas clear. Neck:  Trachea midline. Respiratory:  Normal respiratory effort. Clear to auscultation    Cardiovascular: Regular rate and rhythm    Abdomen: Soft, non-tender, non-distended with normal bowel sounds. Musculoskeletal: No clubbing, cyanosis or edema bilaterally.      Neuro: Non Focal.    Capillary Refill: Brisk,< 3 seconds    Peripheral Pulses: +2 palpable, equal bilaterally            Radiology testing/Labs pertinent    No imaging    LABS:    Sodium 135 - 144 mEq/L 132Low      Potassium 3.4 - 4.9 mEq/L 5.2High      Chloride 95 - 107 mEq/L 95    CO2 20 - 31 mEq/L 26    Anion Gap 9 - 15 mEq/L 11    Glucose 70 - 99 mg/dL 246High      BUN 8 - 23 mg/dL 70High      CREATININE 0.50 - 0.90 mg/dL 1. 79High      GFR Non- >60 28.6Low             Meds-name, dose, route,rate    As listed above and    Elavil 25 mg po nightly    Pepcid 40 mg po daily    Neurontin 400  mg po BID    Haldol 5 mg po daily    S/S insulin ac & hs    Synthroid 50 mcg po daily    Zoloft 200 mg po daily    Continuous: NS @ 50 ml/hr - d/c'd    PRN: none given             Treatment plan-attending, consults    Nephrology Assessment:    CKD-3    OHDx CAD    DM type-2    COPD    Overweight    Depression    PAD    Plan: await today lab  Follow in office          Attending Assessment/Plan:    Subjective:    Patient feels ok today no current complaints.  12 point ROS negative other than mentioned above    #chest pain      - non cardiac    #RADHA on CKD      - slightly improved; still awiating recommendations; if continues to improve possible d/c tomorrow    #hyperkalemia       - kayexalate    #Weankess      - no focal deficits; PT/OT    #HTN/HLD/CAD: Continue home meds     #DMII:  Home meds    #Hypothyroidism:  Continue home meds    Active Hospital Problems      Diagnosis Date Noted    · RADHA (acute kidney injury) ([facility] [N17.9] 04/26/2020    · Chest pain [R07.9]    Diet: DIET CARDIAC; No Caffeine    Code Status: Full Code               Cardiology Assessment:    Subjective:    No changes overall in symptoms. Renal Insufficiency managed by Dr Johnson Other.      Hyperkalemia mild. 14 system General and Cardiac ROS otherwise negative or unchanged. 1. Chest pain, reproducible, atypical, c/w costochondritis, doubt acute coronary syndrome. 2. Shortness of breath    3. Negative troponin, no acute EKG abnormality, doubt ACS. 4. Congestive heart failure, Diastolic and systolic, history, no apparent decompensation currently. 5. ICM, LVEF 45%    6. MR, 3+    7. CAD, Prior CABG, Status post PCI / stent of the ostial circumflex 4/24/20.    8. HTN    9. HLP    10. DM.    11. PHTN    12. Renal insufficiency    13. Anemia    14. PVOD bilateral Legs, Post revascularization [facility] prior. 15. Probable JESICA    Plan:    1. Cardiac Supportive Care    2. Doubt primary cardiovascular problems this admission.  Most likely costochondritis recurrence. 3. Primary hospital, nephrology and pulmonary care as warranted. 4. Continue current medications. 5. Okay to discharge home from cardiovascular standpoint once okay with others.     6. Follow-up with [facility] as scheduled, earlier if need be.    7. Cardiology sign off.    8. See Orders                Chest Pain - Care Day 3 (10/1/2020) by Salazar Heaton RN         Review Status  Review Entered    Completed  10/8/2020 10:15        Criteria Review       Care Day: 3 Care Date: 10/1/2020 Level of Care:    Guideline Day 2    Level Of Care    ( ) ICU, intermediate care, or telemetry to discharge    10/8/2020 10:15 AM EDT by Alyssa Naqvi [facility] unit - no discharge - worsening renal function - monitor labs, renal consult    ( ) Complete discharge planning    10/8/2020 10:15 AM EDT by Hannah Scales      in progress per CM    Clinical Status    (X) * Hemodynamic stability    10/8/2020 10:15 AM EDT by Hannah Scales      112/94, 56, 12, 97.4, 96% on RA    (X) * Acute coronary syndrome ruled out    (X) * Pain absent or managed    10/8/2020 10:15 AM EDT by Piedad Vaughan      absent    (X) * Dangerous arrhythmia absent    10/8/2020 10:15 AM EDT by Piedad Vaughan      none documented - SB/SR per vitals assessment    (X) * No identification of etiology of pain requiring inpatient care    ( ) * Discharge plans and education understood    Activity    (X) * Ambulatory or acceptable for next level of care    10/8/2020 10:15 AM EDT by Piedad Vaughan      up as tolerated    Routes    (X) * Oral hydration, medications, and diet    10/8/2020 10:15 AM EDT by Piedad Vaughan      diet and po meds ordered    Medications    (X) Possible aspirin or cardiac medications    10/8/2020 10:15 AM EDT by Piedad Vaughan      Aspirin 325 mg po daily, Lipitor 40 mg po daily, Lovenox 40 mg SC daily, Apresoline 50 mg po TID, Isordil 20 mg po TID - 2 doses given then d/c'd - changed to BID, toprol XL 50 mg po BID, mINIPRESS 2 MG PO daily, Brlinta 90 mg po BID    ( ) Discontinue IV medications    10/8/2020 10:15 AM EDT by Piedad Vaughan      IVF maintained    * Milestone    Additional Notes    10/1/2020 Care day 3       Physical Exam    General appearance: No apparent distress, appears stated age and cooperative. HEENT: Conjunctivae/corneas clear. Neck:  Trachea midline. Respiratory:  Normal respiratory effort. Clear to auscultation    Cardiovascular: Regular rate and rhythm    Abdomen: Soft, non-tender, non-distended with normal bowel sounds. Musculoskeletal: No clubbing, cyanosis or edema bilaterally.      Neuro: Non Focal.    Capillary Refill: Brisk,< 3 seconds    Peripheral Pulses: +2 palpable, equal bilaterally          Radiology testing/Labs pertinent    R/P U/S:    NEGATIVE RENAL ULTRASOUND.     LABS: Sodium 135 - 144 mEq/L 135    Potassium 3.4 - 4.9 mEq/L 4.8    Chloride 95 - 107 mEq/L 98    CO2 20 - 31 mEq/L 27    Anion Gap 9 - 15 mEq/L 10    Glucose 70 - 99 mg/dL 157High      BUN 8 - 23 mg/dL 66High      CREATININE 0.50 - 0.90 mg/dL 1. 90High      GFR Non- >60 26.7    Sodium 135 - 144 mEq/L 134Low      Potassium 3.4 - 4.9 mEq/L 4.8    Chloride 95 - 107 mEq/L 97    CO2 20 - 31 mEq/L 26    Anion Gap 9 - 15 mEq/L 11    Glucose 70 - 99 mg/dL 224High      BUN 8 - 23 mg/dL 65High      CREATININE 0.50 - 0.90 mg/dL 1. 84High      GFR Non- >60 27.7Low            Meds-name, dose, route,rate    As listed above and    Elavil 25 mg po nightly    Pepcid 40 mg po daily    Neurontin 400  mg po BID    Haldol 5 mg po daily    S/S insulin ac & hs    Synthroid 50 mcg po daily    Zoloft 200 mg po daily    Continuous: NS @ 50 ml/hr    PRN: none given          Treatment plan-attending, consults    Nephrology Assessment:    CKD-3 with RADHA micropoteinuria baseline GFR 40-45    OHDx    DM type-2    Costochondritis agree    COPD JESICA    Obesity    Hypertension    PAD    ** consider Bidil for compliance  (apresoline/NTG)    Plan: follow in office  Watch  not to overdiuresis                   Attending Assessment/Plan:    Subjective:    Patient feels at her baseline today.  12 point ROS negative other than mentioned above    #chest pain      - non cardiac    #RADHA on CKD      - Given worsenign trend Nephrology was consulted; will need this trending in the right direction prior to discharge and given that will need more than 2 midnights during this admission      - Management per nephrology?        - Will give gentle hydration pending their advice    #Weankess      - no focal deficits; PT/OT    #HTN/HLD/CAD: Continue home meds     #DMII:  Home meds    #Hypothyroidism:  Continue home meds    Active Hospital Problems      Diagnosis Date Noted    · RADHA (acute kidney injury) ([facility] [address] [N17.9] 04/26/2020    · Chest pain [R07.9] 02/11/2020    Diet: DIET CARDIAC; No Caffeine    Code Status: Full Code               Cardiology Assessment:    Subjective:    No changes overall.     14 system General and Cardiac ROS otherwise negative or unchanged. 1. Chest pain, reproducible, atypical, c/w costochondritis, doubt acute coronary syndrome. 2. Shortness of breath    3. Negative troponin, no acute EKG abnormality, doubt ACS. 4. Congestive heart failure, Diastolic and systolic, history, no apparent decompensation currently. 5. ICM, LVEF 45%    6. MR, 3+    7. CAD, Prior CABG, Status post PCI / stent of the ostial circumflex 4/24/20.    8. HTN    9. HLP    10. DM.    11. PHTN    12. Renal insufficiency    13. Anemia    14. PVOD bilateral Legs, Post revascularization [facility] prior. 15. Probable JESICA    Plan:    1. Cardiac Supportive Care    2. Doubt primary cardiovascular problems this admission.  Most likely costochondritis recurrence. 3. Primary hospital and pulmonary care as warranted. 4. Continue current medications. 5. Okay to discharge home from cardiovascular standpoint once okay with others. 6. [facility] as scheduled, earlier if need be.    7. Further Recommendations to follow if needed.     8. See Orders

## 2020-10-10 LAB
ANION GAP SERPL CALCULATED.3IONS-SCNC: 11 MEQ/L (ref 9–15)
BUN BLDV-MCNC: 23 MG/DL (ref 8–23)
CALCIUM SERPL-MCNC: 8.9 MG/DL (ref 8.5–9.9)
CHLORIDE BLD-SCNC: 98 MEQ/L (ref 95–107)
CO2: 26 MEQ/L (ref 20–31)
CREAT SERPL-MCNC: 1.04 MG/DL (ref 0.5–0.9)
GFR AFRICAN AMERICAN: >60
GFR NON-AFRICAN AMERICAN: 53.5
GLUCOSE BLD-MCNC: 176 MG/DL (ref 70–99)
POTASSIUM SERPL-SCNC: 4.2 MEQ/L (ref 3.4–4.9)
SODIUM BLD-SCNC: 135 MEQ/L (ref 135–144)

## 2020-11-23 ENCOUNTER — OFFICE VISIT (OUTPATIENT)
Dept: PULMONOLOGY | Age: 65
End: 2020-11-23
Payer: COMMERCIAL

## 2020-11-23 VITALS
HEIGHT: 64 IN | BODY MASS INDEX: 39.27 KG/M2 | TEMPERATURE: 97 F | WEIGHT: 230 LBS | DIASTOLIC BLOOD PRESSURE: 60 MMHG | HEART RATE: 82 BPM | RESPIRATION RATE: 16 BRPM | SYSTOLIC BLOOD PRESSURE: 124 MMHG | OXYGEN SATURATION: 94 %

## 2020-11-23 PROBLEM — E66.9 OBESITY (BMI 30-39.9): Status: ACTIVE | Noted: 2020-11-23

## 2020-11-23 PROCEDURE — G8427 DOCREV CUR MEDS BY ELIG CLIN: HCPCS | Performed by: INTERNAL MEDICINE

## 2020-11-23 PROCEDURE — 99214 OFFICE O/P EST MOD 30 MIN: CPT | Performed by: INTERNAL MEDICINE

## 2020-11-23 PROCEDURE — 1036F TOBACCO NON-USER: CPT | Performed by: INTERNAL MEDICINE

## 2020-11-23 PROCEDURE — G8417 CALC BMI ABV UP PARAM F/U: HCPCS | Performed by: INTERNAL MEDICINE

## 2020-11-23 PROCEDURE — 3017F COLORECTAL CA SCREEN DOC REV: CPT | Performed by: INTERNAL MEDICINE

## 2020-11-23 PROCEDURE — G8484 FLU IMMUNIZE NO ADMIN: HCPCS | Performed by: INTERNAL MEDICINE

## 2020-11-23 RX ORDER — ALBUTEROL SULFATE 90 UG/1
2 AEROSOL, METERED RESPIRATORY (INHALATION) PRN
Qty: 1 INHALER | Refills: 5 | Status: SHIPPED | OUTPATIENT
Start: 2020-11-23 | End: 2021-03-25 | Stop reason: SDUPTHER

## 2020-11-23 RX ORDER — ALBUTEROL SULFATE 2.5 MG/3ML
2.5 SOLUTION RESPIRATORY (INHALATION) EVERY 6 HOURS PRN
Qty: 120 EACH | Refills: 5 | Status: SHIPPED | OUTPATIENT
Start: 2020-11-23 | End: 2021-03-25 | Stop reason: SDUPTHER

## 2020-11-23 ASSESSMENT — ENCOUNTER SYMPTOMS
NAUSEA: 0
VOICE CHANGE: 0
SHORTNESS OF BREATH: 1
SORE THROAT: 0
EYE DISCHARGE: 0
SINUS PRESSURE: 0
RHINORRHEA: 0
TROUBLE SWALLOWING: 0
EYE ITCHING: 0
DIARRHEA: 0
VOMITING: 0
ABDOMINAL PAIN: 0
COUGH: 1
CHEST TIGHTNESS: 0
WHEEZING: 0

## 2020-11-23 NOTE — PROGRESS NOTES
Subjective:     Perlita Rodriguez is a 63 y.o. female who complains today of:     Chief Complaint   Patient presents with    Follow-up     f/u for JESICA and Asthma. HPI    She is using CPAP with   10  centimeters of H2O with heated humidity and 2 lit . She is using CPAP for about   4  hours every night. She is using CPAP with    Mask. She said  sleep is restful with the CPAP use. She is compliant with CPAP therapy and benefiting with CPAP use. No snoring with CPAP use. No complaint of daytime sleepiness or tiredness with CPAP use. She denies taking naps. No sleepiness with driving   She denies difficulty falling asleep or staying asleep. C/o shortness of breath , worse with exertion. No  Wheezing   Occasional  Cough   No Hemoptysis  No Chest tightness   No Chest pain with radiation  or pleuritic pain  No Fever or chills. No Rhinorrhea and postnasal drip. C/o leg swelling . Using bronchodilator with albuterol nebulizer prn, she was on ventolin HFA , montelukast also and need refill     Allergies:  Ambien [zolpidem tartrate]; Capoten [captopril]; Clioquinol; Cogentin [benztropine]; Depakote [divalproex sodium]; Effexor xr [venlafaxine hcl er]; Geodon [ziprasidone hcl]; Lisinopril; Lyrica [pregabalin]; Navane [thiothixene]; Pamelor [nortriptyline hcl]; Remeron [mirtazapine]; Risperdal [risperidone];  Trazodone and nefazodone; and Wellbutrin [bupropion]  Past Medical History:   Diagnosis Date    Asthma     CAD (coronary artery disease)     CKD (chronic kidney disease) stage 3, GFR 30-59 ml/min     Colitis     Diabetes mellitus ([facility]     Hyperlipidemia     Hypertension     [facility]peripheral [facility] disease) ([facility]     Prolonged emergence from general anesthesia     PVD (peripheral vascular disease) ([facility]     Thyroid disease      Past Surgical History:   Procedure Laterality Date    CARDIAC SURGERY       SECTION      COLONOSCOPY N/A 2019    COLONOSCOPY DIAGNOSTIC performed by Ammon Hanna MD at [address] [place]  06/2019    unknown vessels    HYSTERECTOMY      TOE AMPUTATION Right     3rd toe     No family history on file. Social History     Socioeconomic History    Marital status:      Spouse name: Not on file    Number of children: Not on file    Years of education: Not on file    Highest education level: Not on file   Occupational History    Not on file   Social Needs    Financial resource strain: Not on file    Food insecurity     Worry: Not on file     Inability: Not on file    Transportation needs     Medical: Not on file     Non-medical: Not on file   Tobacco Use    Smoking status: Never Smoker    Smokeless tobacco: Never Used   Substance and Sexual Activity    Alcohol use: Never     Frequency: Never    Drug use: Never    Sexual activity: Not on file   Lifestyle    Physical activity     Days per week: Not on file     Minutes per session: Not on file    Stress: Not on file   Relationships    Social connections     Talks on phone: Not on file     Gets together: Not on file     Attends Worship service: Not on file     Active member of club or organization: Not on file     Attends meetings of clubs or organizations: Not on file     Relationship status: Not on file    Intimate partner violence     Fear of current or ex partner: Not on file     Emotionally abused: Not on file     Physically abused: Not on file     Forced sexual activity: Not on file   Other Topics Concern    Not on file   Social History Narrative         Lives With: Spouse    Type of Home: [address] [place] [address] [address]: One level    Home Access: Elevator    Bathroom Shower/Tub: Tub/Shower unit(Simultaneous filing.  User may not have seen previous data.)    Bathroom Equipment: Grab bars in shower, Shower chair    Home Equipment: Rolling walker, [address] bed    ADL Assistance: Needs assistance    Homemaking Assistance: Needs assistance    Homemaking Responsibilities: No    Ambulation Assistance: Independent    Transfer Assistance: Independent    Active : No    Additional Comments: Pt wears orthopedic shoes at baseline    The patient states she is current with [facility](RN per the ). The patient had a walker, but obtained a hospital bed, wheel chair, BSC and O2 last admission (from [address]). pharmacy is [address] [place] [address],[address] [address]., [place]. Transportation is provided by [employer]	[place] () per the patient         Review of Systems   Constitutional: Negative for chills, diaphoresis, fatigue and fever. HENT: Negative for congestion, mouth sores, nosebleeds, postnasal drip, rhinorrhea, sinus pressure, sneezing, sore throat, trouble swallowing and voice change. Eyes: Negative for discharge, itching and visual disturbance. Respiratory: Positive for cough and shortness of breath. Negative for chest tightness and wheezing. Cardiovascular: Positive for leg swelling. Negative for chest pain and palpitations. Gastrointestinal: Negative for abdominal pain, diarrhea, nausea and vomiting. Genitourinary: Negative for difficulty urinating and hematuria. Musculoskeletal: Negative for arthralgias, joint swelling and myalgias. Skin: Negative for rash. Allergic/Immunologic: Negative for environmental allergies and food allergies. Neurological: Negative for dizziness, tremors, weakness and headaches. Psychiatric/Behavioral: Positive for sleep disturbance. Negative for behavioral problems.          :     Vitals:    11/23/20 1455   BP: 124/60   Pulse: 82   Resp: 16   Temp: 97 °F (36.1 °C)   TempSrc: Temporal   SpO2: 94%   Weight: 230 lb (104.3 kg)   Height: 5' 4\" (1.626 m)     Wt Readings from Last 3 Encounters:   11/23/20 230 lb (104.3 kg)   10/01/20 234 lb 6.4 oz (106.3 kg)   09/21/20 239 lb (108.4 kg)         Physical Exam  Constitutional:       General: She is not in acute distress. Appearance: She is well-developed. She is obese. She is not diaphoretic. HENT:      Head: Normocephalic and atraumatic. Nose: Nose normal.   Eyes:      Pupils: Pupils are equal, round, and reactive to light. Neck:      Thyroid: No thyromegaly. Vascular: No JVD. Trachea: No tracheal deviation. Cardiovascular:      Rate and Rhythm: Normal rate and regular rhythm. Heart sounds: No murmur. No friction rub. No gallop. Pulmonary:      Effort: No respiratory distress. Breath sounds: No wheezing or rales. Comments: diminished Breath sound bilaterally. Chest:      Chest wall: No tenderness. Abdominal:      General: There is no distension. Tenderness: There is no abdominal tenderness. There is no rebound. Musculoskeletal: Normal range of motion. Right lower leg: Edema (1+) present. Left lower leg: Edema (1+) present. Lymphadenopathy:      Cervical: No cervical adenopathy. Skin:     General: Skin is warm and dry. Neurological:      Mental Status: She is alert and oriented to person, place, and time.       Coordination: Coordination normal.         Current Outpatient Medications   Medication Sig Dispense Refill    albuterol sulfate HFA (VENTOLIN HFA) 108 (90 Base) MCG/ACT inhaler Inhale 2 puffs into the lungs as needed for Wheezing Every 4-6 hours PRN 1 Inhaler 5    albuterol (PROVENTIL) (2.5 MG/3ML) 0.083% nebulizer solution Take 3 mLs by nebulization every 6 hours as needed for Wheezing 120 each 5    docusate sodium (COLACE, DULCOLAX) 100 MG CAPS Take 100 mg by mouth 2 times daily 60 capsule 1    prazosin (MINIPRESS) 2 MG capsule Take 2 mg by mouth nightly       sertraline (ZOLOFT) 100 MG tablet Take 200 mg by mouth daily       levothyroxine (SYNTHROID) 50 MCG tablet Take 1 tablet by mouth Daily 30 tablet 5    insulin lispro, 1 Unit Dial, (HUMALOG KWIKPEN) 100 UNIT/ML SOPN 10 units at each meals 10 pen 03    Alcohol Swabs (ALCOHOL PREP) 70 % PADS qid 300 each 06    Continuous Blood Gluc Sensor (FREESTYLE FELY 14 DAY SENSOR) MISC Every 2 weeks 2 each 06    Continuous Blood Gluc  (FREESTYLE FELY 14 DAY READER) CATHLEEN As directed 1 Device 00    Insulin Pen Needle (NOVOFINE) 32G X 6 MM MISC qid 300 each 3    montelukast (SINGULAIR) 10 MG tablet take 1 tablet by mouth at bedtime 30 tablet 0    furosemide (LASIX) 40 MG tablet Take 1 tablet by mouth daily 60 tablet 3    magnesium oxide (MAG-OX) 400 MG tablet Take 1 tablet by mouth daily 30 tablet 1    ticagrelor (BRILINTA) 90 MG TABS tablet Take 90 mg by mouth 2 times daily      CPAP Machine MISC by Does not apply route New CPAP with 10 cm 1 each 0    aspirin 81 MG EC tablet Take 1 tablet by mouth daily 30 tablet 3    atorvastatin (LIPITOR) 80 MG tablet Take 1 tablet by mouth nightly 30 tablet 3    OXYGEN 2 lit O2 with sleep , please give O2 concentrator 1 Units 0    Emollient (EUCERIN INTENSIVE REPAIR HAND) 2.5-10 % CREA APPLY TO FEET AT BEDTIME; PLACE SOCKS OVER FEET AFTER APPLICATION IF NEEDED FOR DRY CRACKING FEET      hydrOXYzine (VISTARIL) 25 MG capsule Take 50 mg by mouth 3 times daily as needed       Nutritional Supplements (GLUCERNA SHAKE) LIQD take as directed three times a day      insulin glargine (LANTUS) 100 UNIT/ML injection vial Inject 30 Units into the skin nightly (Patient taking differently: Inject 35 Units into the skin nightly ) 1 vial 3    isosorbide dinitrate (ISORDIL) 20 MG tablet Take 1 tablet by mouth 3 times daily 90 tablet 3    nitroGLYCERIN (NITROSTAT) 0.4 MG SL tablet up to max of 3 total doses.  If no relief after 1 dose, call 911. 25 tablet 3    hydrALAZINE (APRESOLINE) 50 MG tablet Take 1 tablet by mouth every 8 hours 90 tablet 3    metoprolol succinate (TOPROL XL) 50 MG extended release tablet Take 1 tablet by mouth 2 times daily 30 tablet 3    amitriptyline (ELAVIL) 25 MG tablet Take 25 mg by mouth nightly      acetaminophen (TYLENOL) 500 MG tablet Take 500 mg by mouth 3 times daily Take with Tramadol      gabapentin (NEURONTIN) 400 MG capsule Take 400 mg by mouth 2 times daily. AM and 800 mg in pm-9pm      haloperidol (HALDOL) 5 MG tablet Take 5 mg by mouth daily      FreeStyle Lancets MISC 1 each by Does not apply route daily 100 each 3    blood glucose test strips (FREESTYLE LITE) strip 1 each by In Vitro route daily As needed. 659 each 3    folic acid (FOLVITE) 1 MG tablet Take 1 mg by mouth daily      Iron Polysacch [id number] (NIFEREX-150 FORTE PO) Take 1 tablet by mouth daily       Cyanocobalamin (VITAMIN B-12) 1000 MCG extended release tablet Take 1,000 mcg by mouth daily      meclizine (ANTIVERT) 25 MG tablet Take 25 mg by mouth three times daily       No current facility-administered medications for this visit. Results for orders placed during the hospital encounter of 06/28/20   XR CHEST STANDARD (2 VW)    Narrative XR CHEST (2 VW) : 6/29/2020    CLINICAL HISTORY:  SOB .    COMPARISON: 6/28/2020. TECHNIQUE: Upright AP and lateral radiographs of the chest were obtained. FINDINGS:     A shallow inspiration is present without significant infiltrate identified. The heart remains mildly enlarged with postoperative changes from previous CABG. There is no significant pleural effusion, vascular congestion, pneumothorax, or displaced fractures identified. Impression NO EVIDENCE OF ACTIVE CARDIOPULMONARY DISEASE.     ]  Results for orders placed during the hospital encounter of 09/29/20   XR CHEST PORTABLE    Narrative Exam: XR CHEST PORTABLE    History:  Chest pain     Technique: AP portable view of the chest obtained. Comparison: Chest x-ray from June 29, 2020      Findings: There is no change in the median sternotomy. The cardiac silhouette is at the upper limits of normal in size. There are no infiltrates, consolidations or effusions.   Bones of the thorax appear intact. Impression No radiographic evidence of acute intrathoracic process.       ]  Results for orders placed during the hospital encounter of 09/27/19   XR CHEST (SINGLE VIEW FRONTAL)    Narrative EXAMINATION: XR CHEST (SINGLE VIEW FRONTAL)    CLINICAL HISTORY: Z91.89 At risk for ineffective breathing ICD10    COMPARISONS: None available. FINDINGS: Single AP portable view the chest obtained on September 27, 2019 at 1715 hours. There is mild cardiomegaly without vascular congestion pulmonary edema or pleural effusion. The mediastinum is not widened or shifted. The calcified aorta is not   dilated. Sternotomy sutures are present. The chest wall is unremarkable. CONCLUSION: NO ACUTE PROCESS       Assessment/Plan:     1. JESICA (obstructive sleep apnea)  She is using CPAP with   10  centimeters of H2O with heated humidity and 2 lit . She is using CPAP for about   4  hours every night. She is using CPAP with    Mask. She said  sleep is restful with the CPAP use. She is compliant with CPAP therapy and benefiting with CPAP use. No snoring with CPAP use. Continue CPAP therapy as before    2. Mild intermittent asthma without complication  C/o shortness of breath , worse with exertion. No  Wheezing   Occasional  Cough. On Ventolin HFA as needed and albuterol nebulized as needed basis. He is also on singular 10 mg daily. 3. Nocturnal hypoxia  He is using 2 L O2 during sleep with CPAP therapy. 4. Congestive heart failure, unspecified HF chronicity, unspecified heart failure type ([address]  She is on Lasix and following cardiology    5. Obesity (BMI 30-39. 9)  Patient patient is advised try to lose weight. obesity related risk explained to the patient ,  Current weight:  230 lb (104.3 kg) Lbs. BMI:  Body mass index is 39.48 kg/m². Suggested weight control approaches, including dietary changes , exercise, behavioral modification.         Return in about 4 months (around 3/23/2021) for jesica, asthma, hypoxia on Shashi Martino MD

## 2020-11-30 ENCOUNTER — OFFICE VISIT (OUTPATIENT)
Dept: UROLOGY | Age: 65
End: 2020-11-30
Payer: COMMERCIAL

## 2020-11-30 VITALS
HEIGHT: 64 IN | WEIGHT: 240 LBS | SYSTOLIC BLOOD PRESSURE: 122 MMHG | DIASTOLIC BLOOD PRESSURE: 72 MMHG | BODY MASS INDEX: 40.97 KG/M2 | HEART RATE: 67 BPM

## 2020-11-30 LAB
BILIRUBIN, POC: NORMAL
BLOOD URINE, POC: NORMAL
CLARITY, POC: CLEAR
COLOR, POC: YELLOW
GLUCOSE URINE, POC: NORMAL
KETONES, POC: NORMAL
LEUKOCYTE EST, POC: NORMAL
NITRITE, POC: NORMAL
PH, POC: 6.5
POST VOID RESIDUAL (PVR): 47 ML
PROTEIN, POC: NORMAL
SPECIFIC GRAVITY, POC: 1.01
UROBILINOGEN, POC: 0.2

## 2020-11-30 PROCEDURE — 1036F TOBACCO NON-USER: CPT | Performed by: UROLOGY

## 2020-11-30 PROCEDURE — 99213 OFFICE O/P EST LOW 20 MIN: CPT | Performed by: UROLOGY

## 2020-11-30 PROCEDURE — 3017F COLORECTAL CA SCREEN DOC REV: CPT | Performed by: UROLOGY

## 2020-11-30 PROCEDURE — G8417 CALC BMI ABV UP PARAM F/U: HCPCS | Performed by: UROLOGY

## 2020-11-30 PROCEDURE — G8484 FLU IMMUNIZE NO ADMIN: HCPCS | Performed by: UROLOGY

## 2020-11-30 PROCEDURE — 81003 URINALYSIS AUTO W/O SCOPE: CPT | Performed by: UROLOGY

## 2020-11-30 PROCEDURE — 51798 US URINE CAPACITY MEASURE: CPT | Performed by: UROLOGY

## 2020-11-30 PROCEDURE — G8427 DOCREV CUR MEDS BY ELIG CLIN: HCPCS | Performed by: UROLOGY

## 2020-11-30 NOTE — PROGRESS NOTES
[facility] EVALUATION NOTE                                                 H&P                                                                                                                                                 Reason for Visit  Detrusor instability    History of Present Illness  21-year-old female with history of diabetes blood sugars better controlled  Improved bladder control with oxybutynin XL 5 mg p.o. daily  Residual minimal  Urinalysis clear      Urologic Review of Systems/Symptoms  Improved bladder control    Review of Systems  Hospitalization: None recent  All 14 categories of Review of Systems otherwise reviewed no other findings reported.     Past Medical History:   Diagnosis Date    Asthma     CAD (coronary artery disease)     CKD (chronic kidney disease) stage 3, GFR 30-59 ml/min     Colitis     Diabetes mellitus ([address]     Hyperlipidemia     Hypertension     [facility]     Prolonged emergence from general anesthesia     PVD (peripheral vascular disease) ([facility]     Thyroid disease      Past Surgical History:   Procedure Laterality Date    CARDIAC SURGERY      CATARACT REMOVAL WITH IMPLANT Bilateral 11- 11-     SECTION      COLONOSCOPY N/A 2019    COLONOSCOPY DIAGNOSTIC performed by Sydney Jay MD at [address] [place] [address] [place]RAFT  2019    unknown vessels    HYSTERECTOMY      TOE AMPUTATION Right     3rd toe     Social History     Socioeconomic History    Marital status:      Spouse name: None    Number of children: None    Years of education: None    Highest education level: None   Occupational History    None   Social Needs    Financial resource strain: None    Food insecurity     Worry: None     Inability: None    Transportation needs     Medical: None     Non-medical: None   Tobacco Use    Smoking status: Never Smoker    Smokeless tobacco: Never Used   Substance albuterol (PROVENTIL) (2.5 MG/3ML) 0.083% nebulizer solution Take 3 mLs by nebulization every 6 hours as needed for Wheezing 120 each 5    docusate sodium (COLACE, DULCOLAX) 100 MG CAPS Take 100 mg by mouth 2 times daily 60 capsule 1    prazosin (MINIPRESS) 2 MG capsule Take 2 mg by mouth nightly       sertraline (ZOLOFT) 100 MG tablet Take 200 mg by mouth daily       levothyroxine (SYNTHROID) 50 MCG tablet Take 1 tablet by mouth Daily 30 tablet 5    insulin lispro, 1 Unit Dial, (HUMALOG KWIKPEN) 100 UNIT/ML SOPN 10 units at each meals 10 pen 03    Alcohol Swabs (ALCOHOL PREP) 70 % PADS qid 300 each 06    Continuous Blood Gluc Sensor ([employer]ume LifeSTYLE FELY 14 DAY SENSOR) MISC Every 2 weeks 2 each 06    Continuous Blood Gluc  (FREESTYLE FELY 14 DAY READER) CATHLEEN As directed 1 Device 00    Insulin Pen Needle (NOVOFINE) 32G X 6 MM MISC qid 300 each 3    montelukast (SINGULAIR) 10 MG tablet take 1 tablet by mouth at bedtime 30 tablet 0    furosemide (LASIX) 40 MG tablet Take 1 tablet by mouth daily 60 tablet 3    magnesium oxide (MAG-OX) 400 MG tablet Take 1 tablet by mouth daily 30 tablet 1    ticagrelor (BRILINTA) 90 MG TABS tablet Take 90 mg by mouth 2 times daily      CPAP Machine MISC by Does not apply route New CPAP with 10 cm 1 each 0    aspirin 81 MG EC tablet Take 1 tablet by mouth daily 30 tablet 3    atorvastatin (LIPITOR) 80 MG tablet Take 1 tablet by mouth nightly 30 tablet 3    OXYGEN 2 lit O2 with sleep , please give O2 concentrator 1 Units 0    Emollient (EUCERIN INTENSIVE REPAIR HAND) 2.5-10 % CREA APPLY TO FEET AT BEDTIME; PLACE SOCKS OVER FEET AFTER APPLICATION IF NEEDED FOR DRY CRACKING FEET      hydrOXYzine (VISTARIL) 25 MG capsule Take 50 mg by mouth 3 times daily as needed       Nutritional Supplements (GLUCERNA SHAKE) LIQD take as directed three times a day      insulin glargine (LANTUS) 100 UNIT/ML injection vial Inject 30 Units into the skin nightly (Patient taking differently: Inject 35 Units into the skin nightly ) 1 vial 3    isosorbide dinitrate (ISORDIL) 20 MG tablet Take 1 tablet by mouth 3 times daily 90 tablet 3    nitroGLYCERIN (NITROSTAT) 0.4 MG SL tablet up to max of 3 total doses. If no relief after 1 dose, call 911. 25 tablet 3    hydrALAZINE (APRESOLINE) 50 MG tablet Take 1 tablet by mouth every 8 hours 90 tablet 3    metoprolol succinate (TOPROL XL) 50 MG extended release tablet Take 1 tablet by mouth 2 times daily 30 tablet 3    amitriptyline (ELAVIL) 25 MG tablet Take 25 mg by mouth nightly      acetaminophen (TYLENOL) 500 MG tablet Take 500 mg by mouth 3 times daily Take with Tramadol      gabapentin (NEURONTIN) 400 MG capsule Take 400 mg by mouth 2 times daily. AM and 800 mg in pm-9pm      haloperidol (HALDOL) 5 MG tablet Take 5 mg by mouth daily      FreeStyle Lancets MISC 1 each by Does not apply route daily 100 each 3    blood glucose test strips (FREESTYLE LITE) strip 1 each by In Vitro route daily As needed. 200 each 3    folic acid (FOLVITE) 1 MG tablet Take 1 mg by mouth daily      Iron Polysacch [id number] (NIFEREX-150 FORTE PO) Take 1 tablet by mouth daily       Cyanocobalamin (VITAMIN B-12) 1000 MCG extended release tablet Take 1,000 mcg by mouth daily      meclizine (ANTIVERT) 25 MG tablet Take 25 mg by mouth three times daily       No current facility-administered medications for this visit. Ambien [zolpidem tartrate]; Capoten [captopril]; Clioquinol; Cogentin [benztropine]; Depakote [divalproex sodium]; Effexor xr [venlafaxine hcl er]; Geodon [ziprasidone hcl]; Lisinopril; Lyrica [pregabalin]; Navane [thiothixene]; Pamelor [nortriptyline hcl]; Remeron [mirtazapine]; Risperdal [risperidone];  Trazodone and nefazodone; and Wellbutrin [bupropion]  All reviewed and verified by Dr Salome Tucker on today's visit    No results found for: PSA, PSADIA  Results for POC orders placed in visit on 11/30/20   POCT Urinalysis No Micro (Auto) Result Value Ref Range    Color, UA yellow     Clarity, UA clear     Glucose, UA POC neg     Bilirubin, UA neg     Ketones, UA neg     Spec Grav, UA 1.015     Blood, UA POC neg     pH, UA 6.5     Protein, UA POC neg     Urobilinogen, UA 0.2     Leukocytes, UA neg     Nitrite, UA neg    poct post void residual   Result Value Ref Range    post void residual 47 ml    Narrative    A point of care test   Post Void Residual was completed by performing  ultrasound scan of the bladder and  reviewed by Dr Miki Leon         Physical Exam  Vitals:    11/30/20 1304   BP: 122/72   Pulse: 67   Weight: 240 lb (108.9 kg)   Height: 5' 4\" (1.626 m)     Constitutional: Not in distress communicating to patient with . Head and Neck: Normal  Cardiovascular: Normal rate, BP reviewed. Normal  Pulmonary/Chest: Normal respiratory effort not short of breath  Abdominal: Not distended. No suprapubic discomfort  Urologic Exam  UA clear. Postvoid residual 47 cc. Urologic exam otherwise unchanged. Musculoskeletal: Ambulatory. Extremities: No edema  Neurological: Intact  Skin: No lesions  Psychiatric: Flat affect. Assessment/Medical Necessity-Decision Making  Improved voiding symptoms with oxybutynin XL 5 mg p.o. daily  Plan  Continue the medication  No further recommendations  Follow-up as needed  Greater than 50% of 15 minutes spent consulting patient face-to-face  Orders Placed This Encounter   Procedures    POCT Urinalysis No Micro (Auto)    poct post void residual     No orders of the defined types were placed in this encounter. Carlos Painter MD       Please note this report has been partially produced using speech recognition software  And may cause contain errors related to that system including grammar, punctuation and spelling as well as words and phrases that may seem inappropriate. If there are questions or concerns please feel free to contact me to clarify.

## 2020-12-01 DIAGNOSIS — E03.9 HYPOTHYROIDISM, UNSPECIFIED TYPE: ICD-10-CM

## 2020-12-01 DIAGNOSIS — E11.65 TYPE 2 DIABETES MELLITUS WITH HYPERGLYCEMIA, WITH LONG-TERM CURRENT USE OF INSULIN (HCC): ICD-10-CM

## 2020-12-01 DIAGNOSIS — Z79.4 TYPE 2 DIABETES MELLITUS WITH HYPERGLYCEMIA, WITH LONG-TERM CURRENT USE OF INSULIN (HCC): ICD-10-CM

## 2020-12-01 LAB
ANION GAP SERPL CALCULATED.3IONS-SCNC: 14 MEQ/L (ref 9–15)
BUN BLDV-MCNC: 51 MG/DL (ref 8–23)
CALCIUM SERPL-MCNC: 9.8 MG/DL (ref 8.5–9.9)
CHLORIDE BLD-SCNC: 97 MEQ/L (ref 95–107)
CO2: 26 MEQ/L (ref 20–31)
CREAT SERPL-MCNC: 1.32 MG/DL (ref 0.5–0.9)
GFR AFRICAN AMERICAN: 49.2
GFR NON-AFRICAN AMERICAN: 40.6
GLUCOSE BLD-MCNC: 175 MG/DL (ref 70–99)
POTASSIUM SERPL-SCNC: 5.3 MEQ/L (ref 3.4–4.9)
SODIUM BLD-SCNC: 137 MEQ/L (ref 135–144)
T4 FREE: 1.58 NG/DL (ref 0.84–1.68)
TSH SERPL DL<=0.05 MIU/L-ACNC: 1.42 UIU/ML (ref 0.44–3.86)

## 2020-12-07 LAB
HCT VFR BLD CALC: 32.4 % (ref 37–47)
HEMOGLOBIN: 10.7 G/DL (ref 12–16)
MCH RBC QN AUTO: 24.7 PG (ref 27–31.3)
MCHC RBC AUTO-ENTMCNC: 33.1 % (ref 33–37)
MCV RBC AUTO: 74.6 FL (ref 82–100)
PDW BLD-RTO: 23.3 % (ref 11.5–14.5)
PLATELET # BLD: 193 K/UL (ref 130–400)
RBC # BLD: 4.34 M/UL (ref 4.2–5.4)
WBC # BLD: 7.9 K/UL (ref 4.8–10.8)

## 2020-12-21 ENCOUNTER — OFFICE VISIT (OUTPATIENT)
Dept: ENDOCRINOLOGY | Age: 65
End: 2020-12-21
Payer: COMMERCIAL

## 2020-12-21 VITALS
BODY MASS INDEX: 42.05 KG/M2 | HEART RATE: 60 BPM | OXYGEN SATURATION: 95 % | DIASTOLIC BLOOD PRESSURE: 79 MMHG | SYSTOLIC BLOOD PRESSURE: 128 MMHG | WEIGHT: 245 LBS

## 2020-12-21 LAB
CHP ED QC CHECK: NORMAL
GLUCOSE BLD-MCNC: 114 MG/DL
HBA1C MFR BLD: 7.3 %

## 2020-12-21 PROCEDURE — 82962 GLUCOSE BLOOD TEST: CPT | Performed by: INTERNAL MEDICINE

## 2020-12-21 PROCEDURE — 99213 OFFICE O/P EST LOW 20 MIN: CPT | Performed by: INTERNAL MEDICINE

## 2020-12-21 PROCEDURE — 83036 HEMOGLOBIN GLYCOSYLATED A1C: CPT | Performed by: INTERNAL MEDICINE

## 2020-12-21 PROCEDURE — G8484 FLU IMMUNIZE NO ADMIN: HCPCS | Performed by: INTERNAL MEDICINE

## 2020-12-21 PROCEDURE — 3017F COLORECTAL CA SCREEN DOC REV: CPT | Performed by: INTERNAL MEDICINE

## 2020-12-21 PROCEDURE — 1036F TOBACCO NON-USER: CPT | Performed by: INTERNAL MEDICINE

## 2020-12-21 PROCEDURE — 2022F DILAT RTA XM EVC RTNOPTHY: CPT | Performed by: INTERNAL MEDICINE

## 2020-12-21 PROCEDURE — 3051F HG A1C>EQUAL 7.0%<8.0%: CPT | Performed by: INTERNAL MEDICINE

## 2020-12-21 PROCEDURE — G8427 DOCREV CUR MEDS BY ELIG CLIN: HCPCS | Performed by: INTERNAL MEDICINE

## 2020-12-21 PROCEDURE — G8417 CALC BMI ABV UP PARAM F/U: HCPCS | Performed by: INTERNAL MEDICINE

## 2020-12-21 RX ORDER — FLASH GLUCOSE SENSOR
KIT MISCELLANEOUS
Qty: 2 EACH | Refills: 6 | Status: SHIPPED | OUTPATIENT
Start: 2020-12-21 | End: 2021-03-24 | Stop reason: SDUPTHER

## 2020-12-21 NOTE — PROGRESS NOTES
Subjective:      Patient ID: Cuong Curry is a 63 y.o. female. Follow-up on type 2 diabetes complications include heart disease kidney disease last A1c is lower compared to the one from 3 months ago currently on Lantus 35 at bedtime Humalog 10 units with each meals hypothyroidism on Synthroid 50 mcg daily  Diabetes  She presents for her follow-up diabetic visit. She has type 2 diabetes mellitus. Symptoms are improving. Diabetic complications include heart disease, nephropathy and peripheral neuropathy. Risk factors for coronary artery disease include obesity. Current diabetic treatment includes insulin injections. She is currently taking insulin pre-breakfast, pre-lunch, pre-dinner and at bedtime. Her overall blood glucose range is 140-180 mg/dl. (Lab Results       Component                Value               Date                       LABA1C                   7.3                 12/21/2020            )            Lab Results   Component Value Date    TSH 1.420 12/01/2020    T4FREE 1.58 12/01/2020     Results for Betsy Dunn (MRN [id number]) as of 12/21/2020 13:39   Ref. Range 5/7/2020 05:51 5/29/2020 08:42 6/18/2020 11:49 8/31/2020 09:48 12/21/2020 13:33   Hemoglobin A1C Latest Units: % 7.7 (H) 8.2 9.4 8.2 7.3       Results for Betsy Dunn (MRN [id number]) as of 12/21/2020 13:39   Ref.  Range 12/7/2020 11:59 12/7/2020 11:59 12/7/2020 12:00 12/21/2020 13:22 12/21/2020 13:33   Sodium Latest Ref Range: 135 - 144 mEq/L 137       Potassium Latest Ref Range: 3.4 - 4.9 mEq/L 5.7 (H)       Chloride Latest Ref Range: 95 - 107 mEq/L 99       CO2 Latest Ref Range: 20 - 31 mEq/L 30       BUN Latest Ref Range: 8 - 23 mg/dL 52 (H)       Creatinine Latest Ref Range: 0.50 - 0.90 mg/dL 1.55 (H)       Anion Gap Latest Ref Range: 9 - 15 mEq/L 8 (L)       GFR Non- Latest Ref Range: >60  33.8 (L)       GFR  Latest Ref Range: >60  40.9 (L) Protein/Creat Ratio Latest Ref Range: 0.0 - 0.2 mL/mL 0.1 0.1      Glucose Latest Units: mg/dL 98   114    Calcium Latest Ref Range: 8.5 - 9.9 mg/dL 8.9       Phosphorus Latest Ref Range: 2.3 - 4.8 mg/dL 4.2       Albumin Latest Ref Range: 3.5 - 4.6 g/dL 3.6       Hemoglobin A1C Latest Units: %     7.3   WBC Latest Ref Range: 4.8 - 10.8 K/uL 7.9       RBC Latest Ref Range: 4.20 - 5.40 M/uL 4.34       Hemoglobin Quant Latest Ref Range: 12.0 - 16.0 g/dL 10.7 (L)       Hematocrit Latest Ref Range: 37.0 - 47.0 % 32.4 (L)       MCV Latest Ref Range: 82.0 - 100.0 fL 74.6 (L)       MCH Latest Ref Range: 27.0 - 31.3 pg 24.7 (L)       MCHC Latest Ref Range: 33.0 - 37.0 % 33.1       RDW Latest Ref Range: 11.5 - 14.5 % 23.3 (H)       Platelet Count Latest Ref Range: 130 - 400 K/uL 193         Patient Active Problem List   Diagnosis    Severe major depression with psychotic features ([facility])    Type 2 diabetes mellitus with hyperglycemia, with long-term current use of insulin ([facility])    HTN (hypertension)    HLD (hyperlipidemia)    Hypothyroid    Congestive heart failure (HCC)    Non-pressure ulcer of toe ([facility])    Atherosclerotic PVD with intermittent claudication ([facility])    Acute osteomyelitis ([facility]    Infection of skin    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    Surgical wound dehiscence, initial encounter    S/P amputation of lesser toe, right ([facility])    Rectal bleeding    Athscl native arteries of left leg w ulceration oth prt foot ([address]    JESICA (obstructive sleep apnea)    Secondary diabetes mellitus (HCC)    Chest pain    Peripheral vascular disease ([facility])    Cellulitis    Syncope and collapse    Severe mitral regurgitation    Ischemic myocardial dysfunction    Pulmonary hypertension (HCC)    Acute on chronic combined systolic and diastolic congestive heart failure ([facility])    Nocturnal hypoxia    RADHA (acute kidney injury) ([facility])    Dizziness    Acute cerebrovascular accident ([address]    Abnormal gait   insulin lispro, 1 Unit Dial, (HUMALOG KWIKPEN) 100 UNIT/ML SOPN, 10 units at each meals, Disp: 10 pen, Rfl: 03    Alcohol Swabs (ALCOHOL PREP) 70 % PADS, qid, Disp: 300 each, Rfl: 06    Continuous Blood Gluc Sensor (FREESTYLE FELY 14 DAY SENSOR) MISC, Every 2 weeks, Disp: 2 each, Rfl: 06    Continuous Blood Gluc  (FREESTYLE FELY 14 DAY READER) CATHLEEN, As directed, Disp: 1 Device, Rfl: 00    Insulin Pen Needle (NOVOFINE) 32G X 6 MM MISC, qid, Disp: 300 each, Rfl: 3    montelukast (SINGULAIR) 10 MG tablet, take 1 tablet by mouth at bedtime, Disp: 30 tablet, Rfl: 0    furosemide (LASIX) 40 MG tablet, Take 1 tablet by mouth daily, Disp: 60 tablet, Rfl: 3    magnesium oxide (MAG-OX) 400 MG tablet, Take 1 tablet by mouth daily, Disp: 30 tablet, Rfl: 1    ticagrelor (BRILINTA) 90 MG TABS tablet, Take 90 mg by mouth 2 times daily, Disp: , Rfl:     CPAP Machine MISC, by Does not apply route New CPAP with 10 cm, Disp: 1 each, Rfl: 0    aspirin 81 MG EC tablet, Take 1 tablet by mouth daily, Disp: 30 tablet, Rfl: 3    atorvastatin (LIPITOR) 80 MG tablet, Take 1 tablet by mouth nightly, Disp: 30 tablet, Rfl: 3    OXYGEN, 2 lit O2 with sleep , please give O2 concentrator, Disp: 1 Units, Rfl: 0    Emollient (EUCERIN INTENSIVE REPAIR HAND) 2.5-10 % CREA, APPLY TO FEET AT BEDTIME; PLACE SOCKS OVER FEET AFTER APPLICATION IF NEEDED FOR DRY CRACKING FEET, Disp: , Rfl:     hydrOXYzine (VISTARIL) 25 MG capsule, Take 50 mg by mouth 3 times daily as needed , Disp: , Rfl:     Nutritional Supplements (GLUCERNA SHAKE) LIQD, take as directed three times a day, Disp: , Rfl:     insulin glargine (LANTUS) 100 UNIT/ML injection vial, Inject 30 Units into the skin nightly (Patient taking differently: Inject 35 Units into the skin nightly ), Disp: 1 vial, Rfl: 3    isosorbide dinitrate (ISORDIL) 20 MG tablet, Take 1 tablet by mouth 3 times daily, Disp: 90 tablet, Rfl: 3   nitroGLYCERIN (NITROSTAT) 0.4 MG SL tablet, up to max of 3 total doses. If no relief after 1 dose, call 911., Disp: 25 tablet, Rfl: 3    hydrALAZINE (APRESOLINE) 50 MG tablet, Take 1 tablet by mouth every 8 hours, Disp: 90 tablet, Rfl: 3    metoprolol succinate (TOPROL XL) 50 MG extended release tablet, Take 1 tablet by mouth 2 times daily, Disp: 30 tablet, Rfl: 3    amitriptyline (ELAVIL) 25 MG tablet, Take 25 mg by mouth nightly, Disp: , Rfl:     acetaminophen (TYLENOL) 500 MG tablet, Take 500 mg by mouth 3 times daily Take with Tramadol, Disp: , Rfl:     gabapentin (NEURONTIN) 400 MG capsule, Take 400 mg by mouth 2 times daily. AM and 800 mg in pm-9pm, Disp: , Rfl:     haloperidol (HALDOL) 5 MG tablet, Take 5 mg by mouth daily, Disp: , Rfl:     FreeStyle Lancets MISC, 1 each by Does not apply route daily, Disp: 100 each, Rfl: 3    blood glucose test strips (FREESTYLE LITE) strip, 1 each by In Vitro route daily As needed. , Disp: 100 each, Rfl: 3    folic acid (FOLVITE) 1 MG tablet, Take 1 mg by mouth daily, Disp: , Rfl:     Iron Polysacch [id number] (NIFEREX-150 FORTE PO), Take 1 tablet by mouth daily , Disp: , Rfl:     Cyanocobalamin (VITAMIN B-12) 1000 MCG extended release tablet, Take 1,000 mcg by mouth daily, Disp: , Rfl:     meclizine (ANTIVERT) 25 MG tablet, Take 25 mg by mouth three times daily, Disp: , Rfl:     Review of Systems    Vitals:    12/21/20 1323   BP: 128/79   Pulse: 60   SpO2: 95%   Weight: 245 lb (111.1 kg)       Objective:   Physical Exam  Vitals signs reviewed. Constitutional:       Appearance: Normal appearance. She is obese. HENT:      Head: Normocephalic and atraumatic. Right Ear: External ear normal.      Left Ear: External ear normal.      Nose: Nose normal.   Neck:      Musculoskeletal: Normal range of motion and neck supple. Cardiovascular:      Rate and Rhythm: Normal rate.    Pulmonary:      Effort: Pulmonary effort is normal. Musculoskeletal: Normal range of motion. Neurological:      General: No focal deficit present. Mental Status: She is alert. Assessment:       Diagnosis Orders   1. Type 2 diabetes mellitus with hyperglycemia, with long-term current use of insulin (HCC)  POCT Glucose    POCT glycosylated hemoglobin (Hb A1C)   2.  Hypothyroidism, unspecified type  T4, Free    TSH with Reflex           Plan:      Orders Placed This Encounter   Procedures    T4, Free     Standing Status:   Future     Standing Expiration Date:   12/21/2021    TSH with Reflex     Standing Status:   Future     Standing Expiration Date:   12/21/2021    Basic Metabolic Panel     Standing Status:   Future     Standing Expiration Date:   12/21/2021    Hemoglobin A1C     Standing Status:   Future     Standing Expiration Date:   12/21/2021    POCT Glucose    POCT glycosylated hemoglobin (Hb A1C)       Continue Lantus 35 units at bedtime Humalog 10 units with each meals continue Synthroid 50 mcg daily repeat labs in 3 months    Orders Placed This Encounter   Medications    Continuous Blood Gluc Sensor (FREESTYLE FELY 14 DAY SENSOR) MISC     Sig: Every 2 weeks     Dispense:  2 each     Refill:  Mathew Downs MD

## 2021-01-01 ENCOUNTER — TELEPHONE (OUTPATIENT)
Dept: ENDOCRINOLOGY | Age: 66
End: 2021-01-01

## 2021-01-01 ENCOUNTER — OFFICE VISIT (OUTPATIENT)
Dept: ENDOCRINOLOGY | Age: 66
End: 2021-01-01
Payer: COMMERCIAL

## 2021-01-01 ENCOUNTER — HOSPITAL ENCOUNTER (INPATIENT)
Age: 66
LOS: 2 days | Discharge: HOME HEALTH | DRG: 139 | End: 2021-08-26
Attending: EMERGENCY MEDICINE
Payer: COMMERCIAL

## 2021-01-01 ENCOUNTER — APPOINTMENT (OUTPATIENT)
Dept: CT IMAGING | Age: 66
End: 2021-01-01
Payer: COMMERCIAL

## 2021-01-01 ENCOUNTER — APPOINTMENT (OUTPATIENT)
Dept: GENERAL RADIOLOGY | Age: 66
DRG: 139 | End: 2021-01-01
Payer: COMMERCIAL

## 2021-01-01 ENCOUNTER — HOSPITAL ENCOUNTER (EMERGENCY)
Age: 66
Discharge: HOME | End: 2021-11-30
Attending: EMERGENCY MEDICINE
Payer: COMMERCIAL

## 2021-01-01 ENCOUNTER — OFFICE VISIT (OUTPATIENT)
Dept: PULMONOLOGY | Age: 66
End: 2021-01-01
Payer: COMMERCIAL

## 2021-01-01 ENCOUNTER — HOSPITAL ENCOUNTER (EMERGENCY)
Age: 66
Discharge: HOME | End: 2021-08-15
Attending: EMERGENCY MEDICINE
Payer: COMMERCIAL

## 2021-01-01 ENCOUNTER — APPOINTMENT (OUTPATIENT)
Dept: GENERAL RADIOLOGY | Age: 66
DRG: 194 | End: 2021-01-01
Payer: COMMERCIAL

## 2021-01-01 ENCOUNTER — OFFICE VISIT (OUTPATIENT)
Dept: NEUROLOGY | Age: 66
End: 2021-01-01
Payer: COMMERCIAL

## 2021-01-01 ENCOUNTER — TELEPHONE (OUTPATIENT)
Dept: OTHER | Facility: CLINIC | Age: 66
End: 2021-01-01

## 2021-01-01 ENCOUNTER — HOSPITAL ENCOUNTER (EMERGENCY)
Age: 66
Discharge: HOME | End: 2021-09-08
Attending: EMERGENCY MEDICINE
Payer: COMMERCIAL

## 2021-01-01 ENCOUNTER — TELEPHONE (OUTPATIENT)
Dept: NEUROLOGY | Age: 66
End: 2021-01-01

## 2021-01-01 ENCOUNTER — HOSPITAL ENCOUNTER (INPATIENT)
Age: 66
LOS: 4 days | Discharge: HOME | DRG: 194 | End: 2021-11-17
Attending: EMERGENCY MEDICINE | Admitting: INTERNAL MEDICINE
Payer: COMMERCIAL

## 2021-01-01 ENCOUNTER — HOSPITAL ENCOUNTER (OUTPATIENT)
Dept: ULTRASOUND IMAGING | Age: 66
Discharge: HOME | End: 2021-09-23
Payer: COMMERCIAL

## 2021-01-01 ENCOUNTER — HOSPITAL ENCOUNTER (OUTPATIENT)
Dept: WOMENS IMAGING | Age: 66
Discharge: HOME | End: 2021-07-10
Payer: COMMERCIAL

## 2021-01-01 ENCOUNTER — APPOINTMENT (OUTPATIENT)
Dept: NUCLEAR MEDICINE | Age: 66
DRG: 139 | End: 2021-01-01
Payer: COMMERCIAL

## 2021-01-01 ENCOUNTER — HOSPITAL ENCOUNTER (OUTPATIENT)
Dept: GENERAL RADIOLOGY | Age: 66
Discharge: HOME | End: 2021-10-30
Payer: COMMERCIAL

## 2021-01-01 ENCOUNTER — APPOINTMENT (OUTPATIENT)
Dept: CT IMAGING | Age: 66
DRG: 194 | End: 2021-01-01
Payer: COMMERCIAL

## 2021-01-01 VITALS
DIASTOLIC BLOOD PRESSURE: 67 MMHG | HEART RATE: 65 BPM | BODY MASS INDEX: 41.54 KG/M2 | OXYGEN SATURATION: 96 % | WEIGHT: 242 LBS | SYSTOLIC BLOOD PRESSURE: 119 MMHG

## 2021-01-01 VITALS
WEIGHT: 235.6 LBS | SYSTOLIC BLOOD PRESSURE: 116 MMHG | HEART RATE: 74 BPM | DIASTOLIC BLOOD PRESSURE: 65 MMHG | TEMPERATURE: 97 F | BODY MASS INDEX: 40.22 KG/M2 | OXYGEN SATURATION: 99 % | RESPIRATION RATE: 18 BRPM | HEIGHT: 64 IN

## 2021-01-01 VITALS
SYSTOLIC BLOOD PRESSURE: 120 MMHG | OXYGEN SATURATION: 98 % | HEART RATE: 68 BPM | TEMPERATURE: 98.1 F | DIASTOLIC BLOOD PRESSURE: 70 MMHG | HEIGHT: 64 IN | WEIGHT: 240 LBS | BODY MASS INDEX: 40.97 KG/M2

## 2021-01-01 VITALS
WEIGHT: 227 LBS | SYSTOLIC BLOOD PRESSURE: 148 MMHG | HEART RATE: 78 BPM | DIASTOLIC BLOOD PRESSURE: 70 MMHG | BODY MASS INDEX: 38.76 KG/M2 | RESPIRATION RATE: 18 BRPM | HEIGHT: 64 IN | OXYGEN SATURATION: 98 %

## 2021-01-01 VITALS
BODY MASS INDEX: 40.97 KG/M2 | RESPIRATION RATE: 18 BRPM | TEMPERATURE: 98 F | OXYGEN SATURATION: 99 % | DIASTOLIC BLOOD PRESSURE: 70 MMHG | HEART RATE: 74 BPM | HEIGHT: 64 IN | WEIGHT: 240 LBS | SYSTOLIC BLOOD PRESSURE: 119 MMHG

## 2021-01-01 VITALS
BODY MASS INDEX: 39.61 KG/M2 | WEIGHT: 232 LBS | RESPIRATION RATE: 18 BRPM | DIASTOLIC BLOOD PRESSURE: 47 MMHG | HEART RATE: 69 BPM | OXYGEN SATURATION: 100 % | SYSTOLIC BLOOD PRESSURE: 124 MMHG | TEMPERATURE: 98.2 F | HEIGHT: 64 IN

## 2021-01-01 VITALS
DIASTOLIC BLOOD PRESSURE: 60 MMHG | RESPIRATION RATE: 18 BRPM | TEMPERATURE: 98.1 F | WEIGHT: 241.62 LBS | SYSTOLIC BLOOD PRESSURE: 122 MMHG | HEIGHT: 64 IN | HEART RATE: 62 BPM | BODY MASS INDEX: 41.25 KG/M2 | OXYGEN SATURATION: 99 %

## 2021-01-01 VITALS
OXYGEN SATURATION: 97 % | RESPIRATION RATE: 16 BRPM | WEIGHT: 220 LBS | HEART RATE: 73 BPM | TEMPERATURE: 97.2 F | DIASTOLIC BLOOD PRESSURE: 96 MMHG | SYSTOLIC BLOOD PRESSURE: 138 MMHG | BODY MASS INDEX: 37.76 KG/M2

## 2021-01-01 VITALS
DIASTOLIC BLOOD PRESSURE: 65 MMHG | HEART RATE: 64 BPM | SYSTOLIC BLOOD PRESSURE: 142 MMHG | WEIGHT: 242.7 LBS | BODY MASS INDEX: 41.66 KG/M2

## 2021-01-01 VITALS
SYSTOLIC BLOOD PRESSURE: 163 MMHG | WEIGHT: 232 LBS | DIASTOLIC BLOOD PRESSURE: 78 MMHG | BODY MASS INDEX: 39.61 KG/M2 | HEART RATE: 75 BPM | HEIGHT: 64 IN | RESPIRATION RATE: 17 BRPM | TEMPERATURE: 98.3 F | OXYGEN SATURATION: 95 %

## 2021-01-01 VITALS
WEIGHT: 230 LBS | DIASTOLIC BLOOD PRESSURE: 74 MMHG | OXYGEN SATURATION: 97 % | BODY MASS INDEX: 39.27 KG/M2 | SYSTOLIC BLOOD PRESSURE: 122 MMHG | HEART RATE: 72 BPM | HEIGHT: 64 IN

## 2021-01-01 VITALS
HEART RATE: 76 BPM | OXYGEN SATURATION: 99 % | TEMPERATURE: 98.2 F | DIASTOLIC BLOOD PRESSURE: 88 MMHG | SYSTOLIC BLOOD PRESSURE: 139 MMHG | HEIGHT: 64 IN | BODY MASS INDEX: 39.61 KG/M2 | WEIGHT: 232 LBS

## 2021-01-01 DIAGNOSIS — G47.33 OSA (OBSTRUCTIVE SLEEP APNEA): Primary | ICD-10-CM

## 2021-01-01 DIAGNOSIS — I50.9 CONGESTIVE HEART FAILURE, UNSPECIFIED HF CHRONICITY, UNSPECIFIED HEART FAILURE TYPE (HCC): ICD-10-CM

## 2021-01-01 DIAGNOSIS — Z79.4 TYPE 2 DIABETES MELLITUS WITH HYPERGLYCEMIA, WITH LONG-TERM CURRENT USE OF INSULIN (HCC): ICD-10-CM

## 2021-01-01 DIAGNOSIS — I63.9 CEREBROVASCULAR ACCIDENT (CVA), UNSPECIFIED MECHANISM (HCC): Primary | ICD-10-CM

## 2021-01-01 DIAGNOSIS — E03.9 HYPOTHYROIDISM, UNSPECIFIED TYPE: ICD-10-CM

## 2021-01-01 DIAGNOSIS — I63.9 CEREBROVASCULAR ACCIDENT (CVA), UNSPECIFIED MECHANISM (HCC): ICD-10-CM

## 2021-01-01 DIAGNOSIS — D50.9 IRON DEFICIENCY ANEMIA, UNSPECIFIED IRON DEFICIENCY ANEMIA TYPE: ICD-10-CM

## 2021-01-01 DIAGNOSIS — N18.30 STAGE 3 CHRONIC KIDNEY DISEASE, UNSPECIFIED WHETHER STAGE 3A OR 3B CKD (HCC): ICD-10-CM

## 2021-01-01 DIAGNOSIS — G47.34 NOCTURNAL HYPOXIA: ICD-10-CM

## 2021-01-01 DIAGNOSIS — J45.20 MILD INTERMITTENT ASTHMA WITHOUT COMPLICATION: ICD-10-CM

## 2021-01-01 DIAGNOSIS — R19.7 DIARRHEA, UNSPECIFIED TYPE: Primary | ICD-10-CM

## 2021-01-01 DIAGNOSIS — Z79.4 TYPE 2 DIABETES MELLITUS WITH HYPERGLYCEMIA, WITH LONG-TERM CURRENT USE OF INSULIN (HCC): Primary | ICD-10-CM

## 2021-01-01 DIAGNOSIS — I65.23 BILATERAL CAROTID ARTERY STENOSIS: ICD-10-CM

## 2021-01-01 DIAGNOSIS — E11.65 TYPE 2 DIABETES MELLITUS WITH HYPERGLYCEMIA, WITH LONG-TERM CURRENT USE OF INSULIN (HCC): ICD-10-CM

## 2021-01-01 DIAGNOSIS — J18.9 PNEUMONIA OF RIGHT LOWER LOBE DUE TO INFECTIOUS ORGANISM: ICD-10-CM

## 2021-01-01 DIAGNOSIS — E11.65 TYPE 2 DIABETES MELLITUS WITH HYPERGLYCEMIA, WITH LONG-TERM CURRENT USE OF INSULIN (HCC): Primary | ICD-10-CM

## 2021-01-01 DIAGNOSIS — E66.9 OBESITY (BMI 30-39.9): ICD-10-CM

## 2021-01-01 DIAGNOSIS — R07.9 CHEST PAIN, UNSPECIFIED TYPE: Primary | ICD-10-CM

## 2021-01-01 DIAGNOSIS — E03.9 HYPOTHYROIDISM, UNSPECIFIED TYPE: Primary | ICD-10-CM

## 2021-01-01 DIAGNOSIS — E78.2 MIXED HYPERLIPIDEMIA: ICD-10-CM

## 2021-01-01 DIAGNOSIS — R10.9 ABDOMINAL PAIN, UNSPECIFIED ABDOMINAL LOCATION: Primary | ICD-10-CM

## 2021-01-01 DIAGNOSIS — J18.9 PNEUMONIA DUE TO INFECTIOUS ORGANISM, UNSPECIFIED LATERALITY, UNSPECIFIED PART OF LUNG: ICD-10-CM

## 2021-01-01 DIAGNOSIS — E66.9 OBESITY (BMI 30-39.9): Primary | ICD-10-CM

## 2021-01-01 DIAGNOSIS — F41.1 ANXIETY STATE: Primary | ICD-10-CM

## 2021-01-01 DIAGNOSIS — Z12.31 ENCOUNTER FOR SCREENING MAMMOGRAM FOR BREAST CANCER: ICD-10-CM

## 2021-01-01 DIAGNOSIS — Z79.4 TYPE 2 DIABETES MELLITUS WITH DIABETIC NEUROPATHY, WITH LONG-TERM CURRENT USE OF INSULIN (HCC): ICD-10-CM

## 2021-01-01 DIAGNOSIS — I50.9 ACUTE ON CHRONIC CONGESTIVE HEART FAILURE, UNSPECIFIED HEART FAILURE TYPE (HCC): Primary | ICD-10-CM

## 2021-01-01 DIAGNOSIS — G47.33 OSA (OBSTRUCTIVE SLEEP APNEA): ICD-10-CM

## 2021-01-01 DIAGNOSIS — E11.40 TYPE 2 DIABETES MELLITUS WITH DIABETIC NEUROPATHY, WITH LONG-TERM CURRENT USE OF INSULIN (HCC): ICD-10-CM

## 2021-01-01 LAB
ALBUMIN SERPL-MCNC: 3.3 G/DL (ref 3.5–4.6)
ALBUMIN SERPL-MCNC: 3.3 G/DL (ref 3.5–4.6)
ALBUMIN SERPL-MCNC: 3.4 G/DL (ref 3.5–4.6)
ALBUMIN SERPL-MCNC: 3.5 G/DL (ref 3.5–4.6)
ALBUMIN SERPL-MCNC: 3.7 G/DL (ref 3.5–4.6)
ALBUMIN SERPL-MCNC: 3.8 G/DL (ref 3.5–4.6)
ALBUMIN SERPL-MCNC: 3.8 G/DL (ref 3.5–4.6)
ALP BLD-CCNC: 100 U/L (ref 40–130)
ALP BLD-CCNC: 104 U/L (ref 40–130)
ALP BLD-CCNC: 107 U/L (ref 40–130)
ALP BLD-CCNC: 81 U/L (ref 40–130)
ALP BLD-CCNC: 81 U/L (ref 40–130)
ALP BLD-CCNC: 85 U/L (ref 40–130)
ALP BLD-CCNC: 91 U/L (ref 40–130)
ALP BLD-CCNC: 92 U/L (ref 40–130)
ALP BLD-CCNC: 96 U/L (ref 40–130)
ALP BLD-CCNC: 99 U/L (ref 40–130)
ALT SERPL-CCNC: 11 U/L (ref 0–33)
ALT SERPL-CCNC: 11 U/L (ref 0–33)
ALT SERPL-CCNC: 13 U/L (ref 0–33)
ALT SERPL-CCNC: 13 U/L (ref 0–33)
ALT SERPL-CCNC: 14 U/L (ref 0–33)
ALT SERPL-CCNC: 16 U/L (ref 0–33)
ALT SERPL-CCNC: 19 U/L (ref 0–33)
ALT SERPL-CCNC: 20 U/L (ref 0–33)
ALT SERPL-CCNC: 25 U/L (ref 0–33)
ALT SERPL-CCNC: 27 U/L (ref 0–33)
ALT SERPL-CCNC: 30 U/L (ref 0–33)
ANION GAP SERPL CALCULATED.3IONS-SCNC: 10 MEQ/L (ref 9–15)
ANION GAP SERPL CALCULATED.3IONS-SCNC: 11 MEQ/L (ref 9–15)
ANION GAP SERPL CALCULATED.3IONS-SCNC: 12 MEQ/L (ref 9–15)
ANION GAP SERPL CALCULATED.3IONS-SCNC: 13 MEQ/L (ref 9–15)
ANION GAP SERPL CALCULATED.3IONS-SCNC: 8 MEQ/L (ref 9–15)
ANION GAP SERPL CALCULATED.3IONS-SCNC: 9 MEQ/L (ref 9–15)
APTT: 29 SEC (ref 24.4–36.8)
APTT: 32.4 SEC (ref 24.4–36.8)
AST SERPL-CCNC: 16 U/L (ref 0–35)
AST SERPL-CCNC: 16 U/L (ref 0–35)
AST SERPL-CCNC: 18 U/L (ref 0–35)
AST SERPL-CCNC: 18 U/L (ref 0–35)
AST SERPL-CCNC: 19 U/L (ref 0–35)
AST SERPL-CCNC: 20 U/L (ref 0–35)
AST SERPL-CCNC: 20 U/L (ref 0–35)
AST SERPL-CCNC: 21 U/L (ref 0–35)
AST SERPL-CCNC: 22 U/L (ref 0–35)
AST SERPL-CCNC: 23 U/L (ref 0–35)
AST SERPL-CCNC: 26 U/L (ref 0–35)
AST SERPL-CCNC: 28 U/L (ref 0–35)
AST SERPL-CCNC: 41 U/L (ref 0–35)
BASE EXCESS ARTERIAL: 1 (ref -3–3)
BASOPHILS ABSOLUTE: 0 K/UL (ref 0–0.2)
BASOPHILS ABSOLUTE: 0.1 K/UL (ref 0–0.2)
BASOPHILS RELATIVE PERCENT: 0.4 %
BASOPHILS RELATIVE PERCENT: 0.4 %
BASOPHILS RELATIVE PERCENT: 0.5 %
BASOPHILS RELATIVE PERCENT: 0.5 %
BASOPHILS RELATIVE PERCENT: 0.6 %
BASOPHILS RELATIVE PERCENT: 0.6 %
BASOPHILS RELATIVE PERCENT: 0.7 %
BASOPHILS RELATIVE PERCENT: 0.8 %
BILIRUB SERPL-MCNC: 0.3 MG/DL (ref 0.2–0.7)
BILIRUB SERPL-MCNC: 0.3 MG/DL (ref 0.2–0.7)
BILIRUB SERPL-MCNC: 0.4 MG/DL (ref 0.2–0.7)
BILIRUB SERPL-MCNC: 0.5 MG/DL (ref 0.2–0.7)
BILIRUB SERPL-MCNC: 0.6 MG/DL (ref 0.2–0.7)
BILIRUB SERPL-MCNC: 0.7 MG/DL (ref 0.2–0.7)
BILIRUB SERPL-MCNC: 0.7 MG/DL (ref 0.2–0.7)
BILIRUB SERPL-MCNC: <0.2 MG/DL (ref 0.2–0.7)
BILIRUBIN DIRECT: <0.2 MG/DL (ref 0–0.4)
BILIRUBIN DIRECT: <0.2 MG/DL (ref 0–0.4)
BILIRUBIN, INDIRECT: NORMAL MG/DL (ref 0–0.6)
BILIRUBIN, INDIRECT: NORMAL MG/DL (ref 0–0.6)
BLOOD CULTURE, ROUTINE: NORMAL
BUN BLDV-MCNC: 15 MG/DL (ref 8–23)
BUN BLDV-MCNC: 18 MG/DL (ref 8–23)
BUN BLDV-MCNC: 25 MG/DL (ref 8–23)
BUN BLDV-MCNC: 26 MG/DL (ref 8–23)
BUN BLDV-MCNC: 26 MG/DL (ref 8–23)
BUN BLDV-MCNC: 30 MG/DL (ref 8–23)
BUN BLDV-MCNC: 31 MG/DL (ref 8–23)
BUN BLDV-MCNC: 32 MG/DL (ref 8–23)
BUN BLDV-MCNC: 33 MG/DL (ref 8–23)
BUN BLDV-MCNC: 35 MG/DL (ref 8–23)
BUN BLDV-MCNC: 42 MG/DL (ref 8–23)
BUN BLDV-MCNC: 44 MG/DL (ref 8–23)
BUN BLDV-MCNC: 44 MG/DL (ref 8–23)
BUN BLDV-MCNC: 47 MG/DL (ref 8–23)
C-REACTIVE PROTEIN, HIGH SENSITIVITY: 11.1 MG/L (ref 0–5)
C-REACTIVE PROTEIN, HIGH SENSITIVITY: 6.9 MG/L (ref 0–5)
C-REACTIVE PROTEIN, HIGH SENSITIVITY: 78.3 MG/L (ref 0–5)
CALCIUM IONIZED: 1.16 MMOL/L (ref 1.12–1.32)
CALCIUM SERPL-MCNC: 8.4 MG/DL (ref 8.5–9.9)
CALCIUM SERPL-MCNC: 8.5 MG/DL (ref 8.5–9.9)
CALCIUM SERPL-MCNC: 8.5 MG/DL (ref 8.5–9.9)
CALCIUM SERPL-MCNC: 8.6 MG/DL (ref 8.5–9.9)
CALCIUM SERPL-MCNC: 8.7 MG/DL (ref 8.5–9.9)
CALCIUM SERPL-MCNC: 8.9 MG/DL (ref 8.5–9.9)
CALCIUM SERPL-MCNC: 9 MG/DL (ref 8.5–9.9)
CALCIUM SERPL-MCNC: 9.1 MG/DL (ref 8.5–9.9)
CALCIUM SERPL-MCNC: 9.2 MG/DL (ref 8.5–9.9)
CALCIUM SERPL-MCNC: 9.2 MG/DL (ref 8.5–9.9)
CALCIUM SERPL-MCNC: 9.4 MG/DL (ref 8.5–9.9)
CALCIUM SERPL-MCNC: 9.7 MG/DL (ref 8.5–9.9)
CALCIUM SERPL-MCNC: 9.7 MG/DL (ref 8.5–9.9)
CALCIUM SERPL-MCNC: 9.8 MG/DL (ref 8.5–9.9)
CHLORIDE BLD-SCNC: 100 MEQ/L (ref 95–107)
CHLORIDE BLD-SCNC: 104 MEQ/L (ref 95–107)
CHLORIDE BLD-SCNC: 96 MEQ/L (ref 95–107)
CHLORIDE BLD-SCNC: 98 MEQ/L (ref 95–107)
CHLORIDE BLD-SCNC: 99 MEQ/L (ref 95–107)
CHOLESTEROL, TOTAL: 101 MG/DL (ref 0–199)
CHOLESTEROL, TOTAL: 102 MG/DL (ref 0–199)
CHOLESTEROL, TOTAL: 114 MG/DL (ref 0–199)
CHOLESTEROL, TOTAL: 119 MG/DL (ref 0–199)
CHOLESTEROL, TOTAL: 122 MG/DL (ref 0–199)
CHOLESTEROL, TOTAL: 122 MG/DL (ref 0–199)
CHOLESTEROL, TOTAL: 86 MG/DL (ref 0–199)
CHOLESTEROL, TOTAL: 88 MG/DL (ref 0–199)
CHP ED QC CHECK: ABNORMAL
CHP ED QC CHECK: NORMAL
CHP ED QC CHECK: NORMAL
CO2: 22 MEQ/L (ref 20–31)
CO2: 23 MEQ/L (ref 20–31)
CO2: 24 MEQ/L (ref 20–31)
CO2: 25 MEQ/L (ref 20–31)
CO2: 26 MEQ/L (ref 20–31)
CO2: 27 MEQ/L (ref 20–31)
CO2: 28 MEQ/L (ref 20–31)
CREAT SERPL-MCNC: 1.09 MG/DL (ref 0.5–0.9)
CREAT SERPL-MCNC: 1.13 MG/DL (ref 0.5–0.9)
CREAT SERPL-MCNC: 1.22 MG/DL (ref 0.5–0.9)
CREAT SERPL-MCNC: 1.29 MG/DL (ref 0.5–0.9)
CREAT SERPL-MCNC: 1.3 MG/DL (ref 0.5–0.9)
CREAT SERPL-MCNC: 1.34 MG/DL (ref 0.5–0.9)
CREAT SERPL-MCNC: 1.41 MG/DL (ref 0.5–0.9)
CREAT SERPL-MCNC: 1.54 MG/DL (ref 0.5–0.9)
CREAT SERPL-MCNC: 1.56 MG/DL (ref 0.5–0.9)
CREAT SERPL-MCNC: 1.58 MG/DL (ref 0.5–0.9)
CREAT SERPL-MCNC: 1.58 MG/DL (ref 0.5–0.9)
CREAT SERPL-MCNC: 1.6 MG/DL (ref 0.5–0.9)
CREAT SERPL-MCNC: 1.66 MG/DL (ref 0.5–0.9)
CREAT SERPL-MCNC: 1.69 MG/DL (ref 0.5–0.9)
CREAT SERPL-MCNC: 1.88 MG/DL (ref 0.5–0.9)
CREAT SERPL-MCNC: 1.97 MG/DL (ref 0.5–0.9)
CULTURE, BLOOD 2: NORMAL
D DIMER: 0.37 MG/L FEU (ref 0–0.5)
EKG ATRIAL RATE: 63 BPM
EKG ATRIAL RATE: 65 BPM
EKG ATRIAL RATE: 65 BPM
EKG ATRIAL RATE: 66 BPM
EKG ATRIAL RATE: 70 BPM
EKG ATRIAL RATE: 76 BPM
EKG ATRIAL RATE: 77 BPM
EKG P AXIS: 46 DEGREES
EKG P AXIS: 50 DEGREES
EKG P AXIS: 53 DEGREES
EKG P AXIS: 54 DEGREES
EKG P AXIS: 54 DEGREES
EKG P AXIS: 56 DEGREES
EKG P AXIS: 59 DEGREES
EKG P-R INTERVAL: 190 MS
EKG P-R INTERVAL: 216 MS
EKG P-R INTERVAL: 228 MS
EKG P-R INTERVAL: 230 MS
EKG P-R INTERVAL: 232 MS
EKG Q-T INTERVAL: 384 MS
EKG Q-T INTERVAL: 432 MS
EKG Q-T INTERVAL: 444 MS
EKG Q-T INTERVAL: 444 MS
EKG Q-T INTERVAL: 460 MS
EKG Q-T INTERVAL: 462 MS
EKG Q-T INTERVAL: 464 MS
EKG QRS DURATION: 108 MS
EKG QRS DURATION: 114 MS
EKG QRS DURATION: 114 MS
EKG QRS DURATION: 116 MS
EKG QRS DURATION: 120 MS
EKG QRS DURATION: 124 MS
EKG QRS DURATION: 142 MS
EKG QTC CALCULATION (BAZETT): 414 MS
EKG QTC CALCULATION (BAZETT): 461 MS
EKG QTC CALCULATION (BAZETT): 465 MS
EKG QTC CALCULATION (BAZETT): 474 MS
EKG QTC CALCULATION (BAZETT): 478 MS
EKG QTC CALCULATION (BAZETT): 486 MS
EKG QTC CALCULATION (BAZETT): 522 MS
EKG R AXIS: 137 DEGREES
EKG R AXIS: 41 DEGREES
EKG R AXIS: 55 DEGREES
EKG R AXIS: 58 DEGREES
EKG R AXIS: 61 DEGREES
EKG R AXIS: 65 DEGREES
EKG R AXIS: 95 DEGREES
EKG T AXIS: -26 DEGREES
EKG T AXIS: -79 DEGREES
EKG T AXIS: 199 DEGREES
EKG T AXIS: 214 DEGREES
EKG T AXIS: 219 DEGREES
EKG T AXIS: 250 DEGREES
EKG T AXIS: 91 DEGREES
EKG VENTRICULAR RATE: 63 BPM
EKG VENTRICULAR RATE: 65 BPM
EKG VENTRICULAR RATE: 65 BPM
EKG VENTRICULAR RATE: 66 BPM
EKG VENTRICULAR RATE: 70 BPM
EKG VENTRICULAR RATE: 76 BPM
EKG VENTRICULAR RATE: 77 BPM
EOSINOPHILS ABSOLUTE: 0.1 K/UL (ref 0–0.7)
EOSINOPHILS ABSOLUTE: 0.2 K/UL (ref 0–0.7)
EOSINOPHILS ABSOLUTE: 0.2 K/UL (ref 0–0.7)
EOSINOPHILS ABSOLUTE: 0.3 K/UL (ref 0–0.7)
EOSINOPHILS ABSOLUTE: 0.3 K/UL (ref 0–0.7)
EOSINOPHILS RELATIVE PERCENT: 0.6 %
EOSINOPHILS RELATIVE PERCENT: 1 %
EOSINOPHILS RELATIVE PERCENT: 1.3 %
EOSINOPHILS RELATIVE PERCENT: 1.5 %
EOSINOPHILS RELATIVE PERCENT: 1.7 %
EOSINOPHILS RELATIVE PERCENT: 2.4 %
EOSINOPHILS RELATIVE PERCENT: 2.7 %
EOSINOPHILS RELATIVE PERCENT: 3.1 %
GFR AFRICAN AMERICAN: 30.9
GFR AFRICAN AMERICAN: 32.6
GFR AFRICAN AMERICAN: 36.9
GFR AFRICAN AMERICAN: 37.7
GFR AFRICAN AMERICAN: 39.3
GFR AFRICAN AMERICAN: 39.8
GFR AFRICAN AMERICAN: 39.9
GFR AFRICAN AMERICAN: 40.5
GFR AFRICAN AMERICAN: 41.1
GFR AFRICAN AMERICAN: 42
GFR AFRICAN AMERICAN: 42
GFR AFRICAN AMERICAN: 45.5
GFR AFRICAN AMERICAN: 48.2
GFR AFRICAN AMERICAN: 49.9
GFR AFRICAN AMERICAN: 50.4
GFR AFRICAN AMERICAN: 53.8
GFR AFRICAN AMERICAN: 58.6
GFR AFRICAN AMERICAN: >60
GFR NON-AFRICAN AMERICAN: 25.5
GFR NON-AFRICAN AMERICAN: 27
GFR NON-AFRICAN AMERICAN: 30.5
GFR NON-AFRICAN AMERICAN: 31.1
GFR NON-AFRICAN AMERICAN: 32.5
GFR NON-AFRICAN AMERICAN: 32.9
GFR NON-AFRICAN AMERICAN: 33
GFR NON-AFRICAN AMERICAN: 33.4
GFR NON-AFRICAN AMERICAN: 33.9
GFR NON-AFRICAN AMERICAN: 35
GFR NON-AFRICAN AMERICAN: 35
GFR NON-AFRICAN AMERICAN: 37.6
GFR NON-AFRICAN AMERICAN: 39.8
GFR NON-AFRICAN AMERICAN: 41.2
GFR NON-AFRICAN AMERICAN: 41.6
GFR NON-AFRICAN AMERICAN: 44.4
GFR NON-AFRICAN AMERICAN: 48.5
GFR NON-AFRICAN AMERICAN: 50.5
GLOBULIN: 3 G/DL (ref 2.3–3.5)
GLOBULIN: 3.1 G/DL (ref 2.3–3.5)
GLOBULIN: 3.1 G/DL (ref 2.3–3.5)
GLOBULIN: 3.2 G/DL (ref 2.3–3.5)
GLOBULIN: 3.3 G/DL (ref 2.3–3.5)
GLOBULIN: 3.4 G/DL (ref 2.3–3.5)
GLOBULIN: 3.7 G/DL (ref 2.3–3.5)
GLOBULIN: 3.8 G/DL (ref 2.3–3.5)
GLOBULIN: 4.1 G/DL (ref 2.3–3.5)
GLOBULIN: 4.2 G/DL (ref 2.3–3.5)
GLOBULIN: 4.5 G/DL (ref 2.3–3.5)
GLUCOSE BLD-MCNC: 101 MG/DL (ref 70–99)
GLUCOSE BLD-MCNC: 111 MG/DL (ref 60–115)
GLUCOSE BLD-MCNC: 119 MG/DL (ref 70–99)
GLUCOSE BLD-MCNC: 127 MG/DL (ref 70–99)
GLUCOSE BLD-MCNC: 133 MG/DL (ref 60–115)
GLUCOSE BLD-MCNC: 134 MG/DL (ref 70–99)
GLUCOSE BLD-MCNC: 135 MG/DL (ref 60–115)
GLUCOSE BLD-MCNC: 144 MG/DL (ref 60–115)
GLUCOSE BLD-MCNC: 145 MG/DL (ref 60–115)
GLUCOSE BLD-MCNC: 145 MG/DL (ref 70–99)
GLUCOSE BLD-MCNC: 147 MG/DL (ref 60–115)
GLUCOSE BLD-MCNC: 149 MG/DL (ref 60–115)
GLUCOSE BLD-MCNC: 151 MG/DL (ref 60–115)
GLUCOSE BLD-MCNC: 152 MG/DL (ref 60–115)
GLUCOSE BLD-MCNC: 157 MG/DL (ref 70–99)
GLUCOSE BLD-MCNC: 162 MG/DL (ref 60–115)
GLUCOSE BLD-MCNC: 163 MG/DL
GLUCOSE BLD-MCNC: 165 MG/DL (ref 60–115)
GLUCOSE BLD-MCNC: 169 MG/DL (ref 60–115)
GLUCOSE BLD-MCNC: 175 MG/DL (ref 60–115)
GLUCOSE BLD-MCNC: 175 MG/DL (ref 60–115)
GLUCOSE BLD-MCNC: 177 MG/DL (ref 70–99)
GLUCOSE BLD-MCNC: 178 MG/DL (ref 60–115)
GLUCOSE BLD-MCNC: 178 MG/DL (ref 60–115)
GLUCOSE BLD-MCNC: 183 MG/DL (ref 60–115)
GLUCOSE BLD-MCNC: 183 MG/DL (ref 60–115)
GLUCOSE BLD-MCNC: 185 MG/DL (ref 70–99)
GLUCOSE BLD-MCNC: 186 MG/DL
GLUCOSE BLD-MCNC: 195 MG/DL (ref 60–115)
GLUCOSE BLD-MCNC: 203 MG/DL (ref 60–115)
GLUCOSE BLD-MCNC: 208 MG/DL (ref 60–115)
GLUCOSE BLD-MCNC: 211 MG/DL (ref 60–115)
GLUCOSE BLD-MCNC: 215 MG/DL (ref 60–115)
GLUCOSE BLD-MCNC: 216 MG/DL (ref 60–115)
GLUCOSE BLD-MCNC: 220 MG/DL (ref 70–99)
GLUCOSE BLD-MCNC: 228 MG/DL (ref 60–115)
GLUCOSE BLD-MCNC: 232 MG/DL (ref 70–99)
GLUCOSE BLD-MCNC: 237 MG/DL (ref 60–115)
GLUCOSE BLD-MCNC: 239 MG/DL (ref 60–115)
GLUCOSE BLD-MCNC: 241 MG/DL (ref 70–99)
GLUCOSE BLD-MCNC: 245 MG/DL (ref 60–115)
GLUCOSE BLD-MCNC: 246 MG/DL (ref 60–115)
GLUCOSE BLD-MCNC: 269 MG/DL (ref 70–99)
GLUCOSE BLD-MCNC: 311 MG/DL
GLUCOSE BLD-MCNC: 62 MG/DL (ref 70–99)
GLUCOSE BLD-MCNC: 66 MG/DL (ref 60–115)
GLUCOSE BLD-MCNC: 67 MG/DL (ref 70–99)
GLUCOSE BLD-MCNC: 73 MG/DL (ref 70–99)
GLUCOSE BLD-MCNC: 79 MG/DL (ref 60–115)
GLUCOSE BLD-MCNC: 82 MG/DL (ref 60–115)
GLUCOSE BLD-MCNC: 86 MG/DL (ref 60–115)
GLUCOSE FASTING: 134 MG/DL (ref 70–99)
HBA1C MFR BLD: 7.1 % (ref 4.8–5.9)
HBA1C MFR BLD: 7.3 % (ref 4.8–5.9)
HBA1C MFR BLD: 7.5 % (ref 4.8–5.9)
HCO3 ARTERIAL: 25.7 MMOL/L (ref 21–29)
HCT VFR BLD CALC: 28.5 % (ref 37–47)
HCT VFR BLD CALC: 29.3 % (ref 37–47)
HCT VFR BLD CALC: 29.3 % (ref 37–47)
HCT VFR BLD CALC: 31.2 % (ref 37–47)
HCT VFR BLD CALC: 31.5 % (ref 37–47)
HCT VFR BLD CALC: 31.6 % (ref 37–47)
HCT VFR BLD CALC: 31.6 % (ref 37–47)
HCT VFR BLD CALC: 31.7 % (ref 37–47)
HCT VFR BLD CALC: 31.8 % (ref 37–47)
HCT VFR BLD CALC: 32.6 % (ref 37–47)
HCT VFR BLD CALC: 33.2 % (ref 37–47)
HCT VFR BLD CALC: 33.9 % (ref 37–47)
HCT VFR BLD CALC: 34.7 % (ref 37–47)
HCT VFR BLD CALC: 36 % (ref 37–47)
HDLC SERPL-MCNC: 33 MG/DL (ref 40–59)
HDLC SERPL-MCNC: 40 MG/DL (ref 40–59)
HDLC SERPL-MCNC: 42 MG/DL (ref 40–59)
HDLC SERPL-MCNC: 43 MG/DL (ref 40–59)
HDLC SERPL-MCNC: 43 MG/DL (ref 40–59)
HDLC SERPL-MCNC: 44 MG/DL (ref 40–59)
HDLC SERPL-MCNC: 44 MG/DL (ref 40–59)
HDLC SERPL-MCNC: 47 MG/DL (ref 40–59)
HEMOGLOBIN: 10.2 GM/DL (ref 12–16)
HEMOGLOBIN: 10.5 G/DL (ref 12–16)
HEMOGLOBIN: 10.6 G/DL (ref 12–16)
HEMOGLOBIN: 10.6 G/DL (ref 12–16)
HEMOGLOBIN: 10.7 G/DL (ref 12–16)
HEMOGLOBIN: 10.7 G/DL (ref 12–16)
HEMOGLOBIN: 10.8 G/DL (ref 12–16)
HEMOGLOBIN: 10.8 G/DL (ref 12–16)
HEMOGLOBIN: 11.5 G/DL (ref 12–16)
HEMOGLOBIN: 11.7 G/DL (ref 12–16)
HEMOGLOBIN: 11.9 G/DL (ref 12–16)
HEMOGLOBIN: 12.3 G/DL (ref 12–16)
HEMOGLOBIN: 9.7 G/DL (ref 12–16)
HEMOGLOBIN: 9.7 G/DL (ref 12–16)
HEMOGLOBIN: 9.8 G/DL (ref 12–16)
INFLUENZA A: NOT DETECTED
INFLUENZA B: NOT DETECTED
INR BLD: 1.1
INR BLD: 1.2
IRON SATURATION: 17 % (ref 11–46)
IRON: 42 UG/DL (ref 37–145)
LACTATE: 0.71 MMOL/L (ref 0.4–2)
LACTIC ACID: 0.8 MMOL/L (ref 0.5–2.2)
LACTIC ACID: 1.5 MMOL/L (ref 0.5–2.2)
LDL CHOLESTEROL CALCULATED: 34 MG/DL (ref 0–129)
LDL CHOLESTEROL CALCULATED: 36 MG/DL (ref 0–129)
LDL CHOLESTEROL CALCULATED: 44 MG/DL (ref 0–129)
LDL CHOLESTEROL CALCULATED: 47 MG/DL (ref 0–129)
LDL CHOLESTEROL CALCULATED: 51 MG/DL (ref 0–129)
LDL CHOLESTEROL CALCULATED: 61 MG/DL (ref 0–129)
LDL CHOLESTEROL CALCULATED: 63 MG/DL (ref 0–129)
LDL CHOLESTEROL CALCULATED: 65 MG/DL (ref 0–129)
LIPASE: 25 U/L (ref 12–95)
LIPASE: 34 U/L (ref 12–95)
LV EF: 38 %
LVEF MODALITY: NORMAL
LYMPHOCYTES ABSOLUTE: 1.3 K/UL (ref 1–4.8)
LYMPHOCYTES ABSOLUTE: 1.3 K/UL (ref 1–4.8)
LYMPHOCYTES ABSOLUTE: 1.5 K/UL (ref 1–4.8)
LYMPHOCYTES ABSOLUTE: 1.8 K/UL (ref 1–4.8)
LYMPHOCYTES ABSOLUTE: 1.8 K/UL (ref 1–4.8)
LYMPHOCYTES ABSOLUTE: 1.9 K/UL (ref 1–4.8)
LYMPHOCYTES ABSOLUTE: 2.1 K/UL (ref 1–4.8)
LYMPHOCYTES ABSOLUTE: 2.2 K/UL (ref 1–4.8)
LYMPHOCYTES RELATIVE PERCENT: 12.6 %
LYMPHOCYTES RELATIVE PERCENT: 14.3 %
LYMPHOCYTES RELATIVE PERCENT: 17.6 %
LYMPHOCYTES RELATIVE PERCENT: 19.2 %
LYMPHOCYTES RELATIVE PERCENT: 19.5 %
LYMPHOCYTES RELATIVE PERCENT: 20.2 %
LYMPHOCYTES RELATIVE PERCENT: 21.8 %
LYMPHOCYTES RELATIVE PERCENT: 29 %
MAGNESIUM: 1.9 MG/DL (ref 1.7–2.4)
MAGNESIUM: 1.9 MG/DL (ref 1.7–2.4)
MAGNESIUM: 2 MG/DL (ref 1.7–2.4)
MAGNESIUM: 2.1 MG/DL (ref 1.7–2.4)
MAGNESIUM: 2.3 MG/DL (ref 1.7–2.4)
MAGNESIUM: 2.4 MG/DL (ref 1.7–2.4)
MCH RBC QN AUTO: 25.6 PG (ref 27–31.3)
MCH RBC QN AUTO: 25.7 PG (ref 27–31.3)
MCH RBC QN AUTO: 25.7 PG (ref 27–31.3)
MCH RBC QN AUTO: 25.8 PG (ref 27–31.3)
MCH RBC QN AUTO: 25.8 PG (ref 27–31.3)
MCH RBC QN AUTO: 25.9 PG (ref 27–31.3)
MCH RBC QN AUTO: 26 PG (ref 27–31.3)
MCH RBC QN AUTO: 26.2 PG (ref 27–31.3)
MCH RBC QN AUTO: 26.4 PG (ref 27–31.3)
MCH RBC QN AUTO: 26.6 PG (ref 27–31.3)
MCHC RBC AUTO-ENTMCNC: 32.8 % (ref 33–37)
MCHC RBC AUTO-ENTMCNC: 33.2 % (ref 33–37)
MCHC RBC AUTO-ENTMCNC: 33.6 % (ref 33–37)
MCHC RBC AUTO-ENTMCNC: 33.7 % (ref 33–37)
MCHC RBC AUTO-ENTMCNC: 33.7 % (ref 33–37)
MCHC RBC AUTO-ENTMCNC: 34 % (ref 33–37)
MCHC RBC AUTO-ENTMCNC: 34.2 % (ref 33–37)
MCHC RBC AUTO-ENTMCNC: 34.3 % (ref 33–37)
MCHC RBC AUTO-ENTMCNC: 34.3 % (ref 33–37)
MCHC RBC AUTO-ENTMCNC: 34.6 % (ref 33–37)
MCV RBC AUTO: 75.3 FL (ref 82–100)
MCV RBC AUTO: 75.5 FL (ref 82–100)
MCV RBC AUTO: 75.5 FL (ref 82–100)
MCV RBC AUTO: 75.8 FL (ref 82–100)
MCV RBC AUTO: 76.2 FL (ref 82–100)
MCV RBC AUTO: 76.6 FL (ref 82–100)
MCV RBC AUTO: 76.9 FL (ref 82–100)
MCV RBC AUTO: 76.9 FL (ref 82–100)
MCV RBC AUTO: 77.3 FL (ref 82–100)
MCV RBC AUTO: 77.3 FL (ref 82–100)
MCV RBC AUTO: 77.4 FL (ref 82–100)
MCV RBC AUTO: 77.4 FL (ref 82–100)
MCV RBC AUTO: 77.6 FL (ref 82–100)
MCV RBC AUTO: 78.5 FL (ref 82–100)
MONOCYTES ABSOLUTE: 0.6 K/UL (ref 0.2–0.8)
MONOCYTES ABSOLUTE: 0.8 K/UL (ref 0.2–0.8)
MONOCYTES ABSOLUTE: 0.9 K/UL (ref 0.2–0.8)
MONOCYTES ABSOLUTE: 0.9 K/UL (ref 0.2–0.8)
MONOCYTES ABSOLUTE: 1 K/UL (ref 0.2–0.8)
MONOCYTES ABSOLUTE: 1.1 K/UL (ref 0.2–0.8)
MONOCYTES RELATIVE PERCENT: 10.1 %
MONOCYTES RELATIVE PERCENT: 11 %
MONOCYTES RELATIVE PERCENT: 11.5 %
MONOCYTES RELATIVE PERCENT: 12.6 %
MONOCYTES RELATIVE PERCENT: 6.1 %
MONOCYTES RELATIVE PERCENT: 8.8 %
MONOCYTES RELATIVE PERCENT: 9.1 %
MONOCYTES RELATIVE PERCENT: 9.2 %
NEUTROPHILS ABSOLUTE: 4.2 K/UL (ref 1.4–6.5)
NEUTROPHILS ABSOLUTE: 5.9 K/UL (ref 1.4–6.5)
NEUTROPHILS ABSOLUTE: 6 K/UL (ref 1.4–6.5)
NEUTROPHILS ABSOLUTE: 6 K/UL (ref 1.4–6.5)
NEUTROPHILS ABSOLUTE: 6.2 K/UL (ref 1.4–6.5)
NEUTROPHILS ABSOLUTE: 6.7 K/UL (ref 1.4–6.5)
NEUTROPHILS ABSOLUTE: 6.8 K/UL (ref 1.4–6.5)
NEUTROPHILS ABSOLUTE: 8 K/UL (ref 1.4–6.5)
NEUTROPHILS RELATIVE PERCENT: 58.1 %
NEUTROPHILS RELATIVE PERCENT: 64.1 %
NEUTROPHILS RELATIVE PERCENT: 66.1 %
NEUTROPHILS RELATIVE PERCENT: 66.6 %
NEUTROPHILS RELATIVE PERCENT: 68 %
NEUTROPHILS RELATIVE PERCENT: 71.4 %
NEUTROPHILS RELATIVE PERCENT: 74.2 %
NEUTROPHILS RELATIVE PERCENT: 80.1 %
O2 SAT, ARTERIAL: 99 % (ref 93–100)
PCO2 ARTERIAL: 39 MM HG (ref 35–45)
PDW BLD-RTO: 16.2 % (ref 11.5–14.5)
PDW BLD-RTO: 16.4 % (ref 11.5–14.5)
PDW BLD-RTO: 16.5 % (ref 11.5–14.5)
PDW BLD-RTO: 16.5 % (ref 11.5–14.5)
PDW BLD-RTO: 16.6 % (ref 11.5–14.5)
PDW BLD-RTO: 16.8 % (ref 11.5–14.5)
PDW BLD-RTO: 16.8 % (ref 11.5–14.5)
PDW BLD-RTO: 16.9 % (ref 11.5–14.5)
PDW BLD-RTO: 17.2 % (ref 11.5–14.5)
PDW BLD-RTO: 17.3 % (ref 11.5–14.5)
PDW BLD-RTO: 17.4 % (ref 11.5–14.5)
PDW BLD-RTO: 17.5 % (ref 11.5–14.5)
PDW BLD-RTO: 19.1 % (ref 11.5–14.5)
PDW BLD-RTO: 19.3 % (ref 11.5–14.5)
PERFORMED ON: ABNORMAL
PERFORMED ON: NORMAL
PH ARTERIAL: 7.43 (ref 7.35–7.45)
PHOSPHORUS: 2.8 MG/DL (ref 2.3–4.8)
PLATELET # BLD: 156 K/UL (ref 130–400)
PLATELET # BLD: 158 K/UL (ref 130–400)
PLATELET # BLD: 163 K/UL (ref 130–400)
PLATELET # BLD: 175 K/UL (ref 130–400)
PLATELET # BLD: 205 K/UL (ref 130–400)
PLATELET # BLD: 208 K/UL (ref 130–400)
PLATELET # BLD: 222 K/UL (ref 130–400)
PLATELET # BLD: 223 K/UL (ref 130–400)
PLATELET # BLD: 224 K/UL (ref 130–400)
PLATELET # BLD: 241 K/UL (ref 130–400)
PLATELET # BLD: 257 K/UL (ref 130–400)
PLATELET # BLD: 265 K/UL (ref 130–400)
PLATELET # BLD: 266 K/UL (ref 130–400)
PLATELET # BLD: 269 K/UL (ref 130–400)
PO2 ARTERIAL: 126 MM HG (ref 75–108)
POC CHLORIDE: 102 MEQ/L (ref 99–110)
POC CREATININE WHOLE BLOOD: 1.5
POC CREATININE: 1.5 MG/DL (ref 0.6–1.2)
POC CREATININE: 1.5 MG/DL (ref 0.6–1.2)
POC FIO2: 4
POC HEMATOCRIT: 30 % (ref 36–48)
POC POTASSIUM: 3.7 MEQ/L (ref 3.5–5.1)
POC SAMPLE TYPE: ABNORMAL
POC SAMPLE TYPE: ABNORMAL
POC SODIUM: 136 MEQ/L (ref 136–145)
POTASSIUM REFLEX MAGNESIUM: 4 MEQ/L (ref 3.4–4.9)
POTASSIUM REFLEX MAGNESIUM: 4.2 MEQ/L (ref 3.4–4.9)
POTASSIUM REFLEX MAGNESIUM: 4.3 MEQ/L (ref 3.4–4.9)
POTASSIUM REFLEX MAGNESIUM: 4.3 MEQ/L (ref 3.4–4.9)
POTASSIUM REFLEX MAGNESIUM: 4.6 MEQ/L (ref 3.4–4.9)
POTASSIUM REFLEX MAGNESIUM: 4.8 MEQ/L (ref 3.4–4.9)
POTASSIUM REFLEX MAGNESIUM: 4.9 MEQ/L (ref 3.4–4.9)
POTASSIUM SERPL-SCNC: 3.8 MEQ/L (ref 3.4–4.9)
POTASSIUM SERPL-SCNC: 4 MEQ/L (ref 3.4–4.9)
POTASSIUM SERPL-SCNC: 4.3 MEQ/L (ref 3.4–4.9)
POTASSIUM SERPL-SCNC: 4.4 MEQ/L (ref 3.4–4.9)
POTASSIUM SERPL-SCNC: 4.5 MEQ/L (ref 3.4–4.9)
POTASSIUM SERPL-SCNC: 4.5 MEQ/L (ref 3.4–4.9)
POTASSIUM SERPL-SCNC: 4.7 MEQ/L (ref 3.4–4.9)
POTASSIUM SERPL-SCNC: 4.7 MEQ/L (ref 3.4–4.9)
POTASSIUM SERPL-SCNC: 4.8 MEQ/L (ref 3.4–4.9)
POTASSIUM SERPL-SCNC: 4.9 MEQ/L (ref 3.4–4.9)
POTASSIUM SERPL-SCNC: 5 MEQ/L (ref 3.4–4.9)
POTASSIUM SERPL-SCNC: 5.2 MEQ/L (ref 3.4–4.9)
PRO-BNP: 3242 PG/ML
PRO-BNP: NORMAL PG/ML
PROCALCITONIN: 0.05 NG/ML (ref 0–0.15)
PROTHROMBIN TIME: 14.5 SEC (ref 12.3–14.9)
PROTHROMBIN TIME: 15.2 SEC (ref 12.3–14.9)
RBC # BLD: 3.76 M/UL (ref 4.2–5.4)
RBC # BLD: 3.79 M/UL (ref 4.2–5.4)
RBC # BLD: 3.79 M/UL (ref 4.2–5.4)
RBC # BLD: 4.04 M/UL (ref 4.2–5.4)
RBC # BLD: 4.06 M/UL (ref 4.2–5.4)
RBC # BLD: 4.09 M/UL (ref 4.2–5.4)
RBC # BLD: 4.13 M/UL (ref 4.2–5.4)
RBC # BLD: 4.14 M/UL (ref 4.2–5.4)
RBC # BLD: 4.15 M/UL (ref 4.2–5.4)
RBC # BLD: 4.15 M/UL (ref 4.2–5.4)
RBC # BLD: 4.32 M/UL (ref 4.2–5.4)
RBC # BLD: 4.51 M/UL (ref 4.2–5.4)
RBC # BLD: 4.59 M/UL (ref 4.2–5.4)
RBC # BLD: 4.77 M/UL (ref 4.2–5.4)
SARS-COV-2 RNA, RT PCR: NOT DETECTED
SEDIMENTATION RATE, ERYTHROCYTE: 36 MM (ref 0–30)
SEDIMENTATION RATE, ERYTHROCYTE: 37 MM (ref 0–30)
SEDIMENTATION RATE, ERYTHROCYTE: 80 MM (ref 0–30)
SODIUM BLD-SCNC: 131 MEQ/L (ref 135–144)
SODIUM BLD-SCNC: 133 MEQ/L (ref 135–144)
SODIUM BLD-SCNC: 133 MEQ/L (ref 135–144)
SODIUM BLD-SCNC: 134 MEQ/L (ref 135–144)
SODIUM BLD-SCNC: 135 MEQ/L (ref 135–144)
SODIUM BLD-SCNC: 136 MEQ/L (ref 135–144)
SODIUM BLD-SCNC: 137 MEQ/L (ref 135–144)
SODIUM BLD-SCNC: 138 MEQ/L (ref 135–144)
SODIUM BLD-SCNC: 140 MEQ/L (ref 135–144)
T4 FREE: 1.44 NG/DL (ref 0.84–1.68)
T4 FREE: 1.45 NG/DL (ref 0.84–1.68)
TCO2 ARTERIAL: 27 (ref 22–29)
TOTAL CK: 75 U/L (ref 0–170)
TOTAL CK: 78 U/L (ref 0–170)
TOTAL IRON BINDING CAPACITY: 254 UG/DL (ref 178–450)
TOTAL PROTEIN: 6.4 G/DL (ref 6.3–8)
TOTAL PROTEIN: 6.5 G/DL (ref 6.3–8)
TOTAL PROTEIN: 6.5 G/DL (ref 6.3–8)
TOTAL PROTEIN: 6.6 G/DL (ref 6.3–8)
TOTAL PROTEIN: 6.8 G/DL (ref 6.3–8)
TOTAL PROTEIN: 7.1 G/DL (ref 6.3–8)
TOTAL PROTEIN: 7.2 G/DL (ref 6.3–8)
TOTAL PROTEIN: 7.3 G/DL (ref 6.3–8)
TOTAL PROTEIN: 7.5 G/DL (ref 6.3–8)
TOTAL PROTEIN: 7.6 G/DL (ref 6.3–8)
TOTAL PROTEIN: 7.9 G/DL (ref 6.3–8)
TOTAL PROTEIN: 7.9 G/DL (ref 6.3–8)
TOTAL PROTEIN: 8 G/DL (ref 6.3–8)
TRIGL SERPL-MCNC: 118 MG/DL (ref 0–150)
TRIGL SERPL-MCNC: 50 MG/DL (ref 0–150)
TRIGL SERPL-MCNC: 52 MG/DL (ref 0–150)
TRIGL SERPL-MCNC: 60 MG/DL (ref 0–150)
TRIGL SERPL-MCNC: 73 MG/DL (ref 0–150)
TRIGL SERPL-MCNC: 74 MG/DL (ref 0–150)
TRIGL SERPL-MCNC: 76 MG/DL (ref 0–150)
TRIGL SERPL-MCNC: 80 MG/DL (ref 0–150)
TROPONIN: 0.01 NG/ML (ref 0–0.01)
TROPONIN: 0.02 NG/ML (ref 0–0.01)
TROPONIN: 0.04 NG/ML (ref 0–0.01)
TROPONIN: 0.05 NG/ML (ref 0–0.01)
TROPONIN: 0.05 NG/ML (ref 0–0.01)
TROPONIN: 0.08 NG/ML (ref 0–0.01)
TROPONIN: 0.08 NG/ML (ref 0–0.01)
TROPONIN: 0.09 NG/ML (ref 0–0.01)
TROPONIN: <0.01 NG/ML (ref 0–0.01)
TROPONIN: <0.01 NG/ML (ref 0–0.01)
TSH REFLEX: 1.87 UIU/ML (ref 0.44–3.86)
TSH REFLEX: 3.67 UIU/ML (ref 0.44–3.86)
TSH SERPL DL<=0.05 MIU/L-ACNC: 1.47 UIU/ML (ref 0.44–3.86)
WBC # BLD: 10 K/UL (ref 4.8–10.8)
WBC # BLD: 10.2 K/UL (ref 4.8–10.8)
WBC # BLD: 10.2 K/UL (ref 4.8–10.8)
WBC # BLD: 7.2 K/UL (ref 4.8–10.8)
WBC # BLD: 7.4 K/UL (ref 4.8–10.8)
WBC # BLD: 7.5 K/UL (ref 4.8–10.8)
WBC # BLD: 7.5 K/UL (ref 4.8–10.8)
WBC # BLD: 8.4 K/UL (ref 4.8–10.8)
WBC # BLD: 8.4 K/UL (ref 4.8–10.8)
WBC # BLD: 8.6 K/UL (ref 4.8–10.8)
WBC # BLD: 9 K/UL (ref 4.8–10.8)
WBC # BLD: 9.1 K/UL (ref 4.8–10.8)
WBC # BLD: 9.1 K/UL (ref 4.8–10.8)
WBC # BLD: 9.2 K/UL (ref 4.8–10.8)

## 2021-01-01 PROCEDURE — 80061 LIPID PANEL: CPT

## 2021-01-01 PROCEDURE — 85025 COMPLETE CBC W/AUTO DIFF WBC: CPT

## 2021-01-01 PROCEDURE — 3017F COLORECTAL CA SCREEN DOC REV: CPT | Performed by: INTERNAL MEDICINE

## 2021-01-01 PROCEDURE — 84484 ASSAY OF TROPONIN QUANT: CPT

## 2021-01-01 PROCEDURE — 83690 ASSAY OF LIPASE: CPT

## 2021-01-01 PROCEDURE — 6360000002 HC RX W HCPCS: Performed by: INTERNAL MEDICINE

## 2021-01-01 PROCEDURE — 2022F DILAT RTA XM EVC RTNOPTHY: CPT | Performed by: INTERNAL MEDICINE

## 2021-01-01 PROCEDURE — 83735 ASSAY OF MAGNESIUM: CPT

## 2021-01-01 PROCEDURE — 96374 THER/PROPH/DIAG INJ IV PUSH: CPT

## 2021-01-01 PROCEDURE — 6370000000 HC RX 637 (ALT 250 FOR IP): Performed by: NURSE PRACTITIONER

## 2021-01-01 PROCEDURE — 83605 ASSAY OF LACTIC ACID: CPT

## 2021-01-01 PROCEDURE — 1111F DSCHRG MED/CURRENT MED MERGE: CPT | Performed by: INTERNAL MEDICINE

## 2021-01-01 PROCEDURE — G8427 DOCREV CUR MEDS BY ELIG CLIN: HCPCS | Performed by: INTERNAL MEDICINE

## 2021-01-01 PROCEDURE — 6360000002 HC RX W HCPCS: Performed by: NURSE PRACTITIONER

## 2021-01-01 PROCEDURE — 93306 TTE W/DOPPLER COMPLETE: CPT

## 2021-01-01 PROCEDURE — 86141 C-REACTIVE PROTEIN HS: CPT

## 2021-01-01 PROCEDURE — 96365 THER/PROPH/DIAG IV INF INIT: CPT

## 2021-01-01 PROCEDURE — 85027 COMPLETE CBC AUTOMATED: CPT

## 2021-01-01 PROCEDURE — 99214 OFFICE O/P EST MOD 30 MIN: CPT | Performed by: INTERNAL MEDICINE

## 2021-01-01 PROCEDURE — 80053 COMPREHEN METABOLIC PANEL: CPT

## 2021-01-01 PROCEDURE — 2580000003 HC RX 258: Performed by: NURSE PRACTITIONER

## 2021-01-01 PROCEDURE — 82803 BLOOD GASES ANY COMBINATION: CPT

## 2021-01-01 PROCEDURE — 93005 ELECTROCARDIOGRAM TRACING: CPT | Performed by: EMERGENCY MEDICINE

## 2021-01-01 PROCEDURE — 3430000000 HC RX DIAGNOSTIC RADIOPHARMACEUTICAL: Performed by: INTERNAL MEDICINE

## 2021-01-01 PROCEDURE — 3017F COLORECTAL CA SCREEN DOC REV: CPT | Performed by: NURSE PRACTITIONER

## 2021-01-01 PROCEDURE — 1036F TOBACCO NON-USER: CPT | Performed by: INTERNAL MEDICINE

## 2021-01-01 PROCEDURE — 96372 THER/PROPH/DIAG INJ SC/IM: CPT

## 2021-01-01 PROCEDURE — 85652 RBC SED RATE AUTOMATED: CPT

## 2021-01-01 PROCEDURE — 36415 COLL VENOUS BLD VENIPUNCTURE: CPT

## 2021-01-01 PROCEDURE — G0378 HOSPITAL OBSERVATION PER HR: HCPCS

## 2021-01-01 PROCEDURE — 6370000000 HC RX 637 (ALT 250 FOR IP): Performed by: EMERGENCY MEDICINE

## 2021-01-01 PROCEDURE — 99283 EMERGENCY DEPT VISIT LOW MDM: CPT

## 2021-01-01 PROCEDURE — 99284 EMERGENCY DEPT VISIT MOD MDM: CPT

## 2021-01-01 PROCEDURE — 2060000000 HC ICU INTERMEDIATE R&B

## 2021-01-01 PROCEDURE — 6370000000 HC RX 637 (ALT 250 FOR IP): Performed by: INTERNAL MEDICINE

## 2021-01-01 PROCEDURE — 80048 BASIC METABOLIC PNL TOTAL CA: CPT

## 2021-01-01 PROCEDURE — 84295 ASSAY OF SERUM SODIUM: CPT

## 2021-01-01 PROCEDURE — 1111F DSCHRG MED/CURRENT MED MERGE: CPT | Performed by: NURSE PRACTITIONER

## 2021-01-01 PROCEDURE — G8417 CALC BMI ABV UP PARAM F/U: HCPCS | Performed by: INTERNAL MEDICINE

## 2021-01-01 PROCEDURE — 73130 X-RAY EXAM OF HAND: CPT

## 2021-01-01 PROCEDURE — G8427 DOCREV CUR MEDS BY ELIG CLIN: HCPCS | Performed by: NURSE PRACTITIONER

## 2021-01-01 PROCEDURE — 2580000003 HC RX 258: Performed by: INTERNAL MEDICINE

## 2021-01-01 PROCEDURE — 2700000000 HC OXYGEN THERAPY PER DAY

## 2021-01-01 PROCEDURE — 83550 IRON BINDING TEST: CPT

## 2021-01-01 PROCEDURE — 99282 EMERGENCY DEPT VISIT SF MDM: CPT

## 2021-01-01 PROCEDURE — 99214 OFFICE O/P EST MOD 30 MIN: CPT | Performed by: NURSE PRACTITIONER

## 2021-01-01 PROCEDURE — 83540 ASSAY OF IRON: CPT

## 2021-01-01 PROCEDURE — 93017 CV STRESS TEST TRACING ONLY: CPT

## 2021-01-01 PROCEDURE — 94640 AIRWAY INHALATION TREATMENT: CPT

## 2021-01-01 PROCEDURE — 82962 GLUCOSE BLOOD TEST: CPT | Performed by: INTERNAL MEDICINE

## 2021-01-01 PROCEDURE — 82330 ASSAY OF CALCIUM: CPT

## 2021-01-01 PROCEDURE — 85730 THROMBOPLASTIN TIME PARTIAL: CPT

## 2021-01-01 PROCEDURE — 96375 TX/PRO/DX INJ NEW DRUG ADDON: CPT

## 2021-01-01 PROCEDURE — 83880 ASSAY OF NATRIURETIC PEPTIDE: CPT

## 2021-01-01 PROCEDURE — 93010 ELECTROCARDIOGRAM REPORT: CPT | Performed by: INTERNAL MEDICINE

## 2021-01-01 PROCEDURE — 6360000002 HC RX W HCPCS: Performed by: EMERGENCY MEDICINE

## 2021-01-01 PROCEDURE — 6360000004 HC RX CONTRAST MEDICATION: Performed by: STUDENT IN AN ORGANIZED HEALTH CARE EDUCATION/TRAINING PROGRAM

## 2021-01-01 PROCEDURE — 93005 ELECTROCARDIOGRAM TRACING: CPT | Performed by: INTERNAL MEDICINE

## 2021-01-01 PROCEDURE — 71045 X-RAY EXAM CHEST 1 VIEW: CPT

## 2021-01-01 PROCEDURE — 2022F DILAT RTA XM EVC RTNOPTHY: CPT | Performed by: NURSE PRACTITIONER

## 2021-01-01 PROCEDURE — 71046 X-RAY EXAM CHEST 2 VIEWS: CPT

## 2021-01-01 PROCEDURE — 85610 PROTHROMBIN TIME: CPT

## 2021-01-01 PROCEDURE — 96366 THER/PROPH/DIAG IV INF ADDON: CPT

## 2021-01-01 PROCEDURE — 74176 CT ABD & PELVIS W/O CONTRAST: CPT

## 2021-01-01 PROCEDURE — 2580000003 HC RX 258: Performed by: EMERGENCY MEDICINE

## 2021-01-01 PROCEDURE — G8484 FLU IMMUNIZE NO ADMIN: HCPCS | Performed by: INTERNAL MEDICINE

## 2021-01-01 PROCEDURE — G8417 CALC BMI ABV UP PARAM F/U: HCPCS | Performed by: NURSE PRACTITIONER

## 2021-01-01 PROCEDURE — 2580000003 HC RX 258: Performed by: STUDENT IN AN ORGANIZED HEALTH CARE EDUCATION/TRAINING PROGRAM

## 2021-01-01 PROCEDURE — 3051F HG A1C>EQUAL 7.0%<8.0%: CPT | Performed by: INTERNAL MEDICINE

## 2021-01-01 PROCEDURE — G8428 CUR MEDS NOT DOCUMENT: HCPCS | Performed by: INTERNAL MEDICINE

## 2021-01-01 PROCEDURE — 87636 SARSCOV2 & INF A&B AMP PRB: CPT

## 2021-01-01 PROCEDURE — 94761 N-INVAS EAR/PLS OXIMETRY MLT: CPT

## 2021-01-01 PROCEDURE — 99213 OFFICE O/P EST LOW 20 MIN: CPT | Performed by: INTERNAL MEDICINE

## 2021-01-01 PROCEDURE — 84132 ASSAY OF SERUM POTASSIUM: CPT

## 2021-01-01 PROCEDURE — 1036F TOBACCO NON-USER: CPT | Performed by: NURSE PRACTITIONER

## 2021-01-01 PROCEDURE — 93005 ELECTROCARDIOGRAM TRACING: CPT | Performed by: NURSE PRACTITIONER

## 2021-01-01 PROCEDURE — 99285 EMERGENCY DEPT VISIT HI MDM: CPT

## 2021-01-01 PROCEDURE — 85014 HEMATOCRIT: CPT

## 2021-01-01 PROCEDURE — 6360000002 HC RX W HCPCS: Performed by: STUDENT IN AN ORGANIZED HEALTH CARE EDUCATION/TRAINING PROGRAM

## 2021-01-01 PROCEDURE — 82550 ASSAY OF CK (CPK): CPT

## 2021-01-01 PROCEDURE — 99215 OFFICE O/P EST HI 40 MIN: CPT | Performed by: INTERNAL MEDICINE

## 2021-01-01 PROCEDURE — 3051F HG A1C>EQUAL 7.0%<8.0%: CPT | Performed by: NURSE PRACTITIONER

## 2021-01-01 PROCEDURE — 78452 HT MUSCLE IMAGE SPECT MULT: CPT

## 2021-01-01 PROCEDURE — 93005 ELECTROCARDIOGRAM TRACING: CPT | Performed by: PHYSICIAN ASSISTANT

## 2021-01-01 PROCEDURE — 71275 CT ANGIOGRAPHY CHEST: CPT

## 2021-01-01 PROCEDURE — 94667 MNPJ CHEST WALL 1ST: CPT

## 2021-01-01 PROCEDURE — 6370000000 HC RX 637 (ALT 250 FOR IP): Performed by: STUDENT IN AN ORGANIZED HEALTH CARE EDUCATION/TRAINING PROGRAM

## 2021-01-01 PROCEDURE — 6370000000 HC RX 637 (ALT 250 FOR IP): Performed by: PHYSICIAN ASSISTANT

## 2021-01-01 PROCEDURE — 36600 WITHDRAWAL OF ARTERIAL BLOOD: CPT

## 2021-01-01 PROCEDURE — 82435 ASSAY OF BLOOD CHLORIDE: CPT

## 2021-01-01 PROCEDURE — 96376 TX/PRO/DX INJ SAME DRUG ADON: CPT

## 2021-01-01 PROCEDURE — 82565 ASSAY OF CREATININE: CPT

## 2021-01-01 PROCEDURE — 2500000003 HC RX 250 WO HCPCS: Performed by: EMERGENCY MEDICINE

## 2021-01-01 PROCEDURE — 77067 SCR MAMMO BI INCL CAD: CPT

## 2021-01-01 PROCEDURE — A9502 TC99M TETROFOSMIN: HCPCS | Performed by: INTERNAL MEDICINE

## 2021-01-01 PROCEDURE — 85379 FIBRIN DEGRADATION QUANT: CPT

## 2021-01-01 PROCEDURE — 93880 EXTRACRANIAL BILAT STUDY: CPT

## 2021-01-01 PROCEDURE — 87040 BLOOD CULTURE FOR BACTERIA: CPT

## 2021-01-01 PROCEDURE — 84145 PROCALCITONIN (PCT): CPT

## 2021-01-01 RX ORDER — INSULIN LISPRO 100 [IU]/ML
INJECTION, SOLUTION INTRAVENOUS; SUBCUTANEOUS
Qty: 10 PEN | Refills: 3 | Status: SHIPPED | OUTPATIENT
Start: 2021-01-01 | End: 2021-01-01 | Stop reason: SDUPTHER

## 2021-01-01 RX ORDER — SODIUM CHLORIDE 0.9 % (FLUSH) 0.9 %
10 SYRINGE (ML) INJECTION PRN
Status: DISCONTINUED | OUTPATIENT
Start: 2021-01-01 | End: 2021-01-01 | Stop reason: HOSPADM

## 2021-01-01 RX ORDER — 0.9 % SODIUM CHLORIDE 0.9 %
1000 INTRAVENOUS SOLUTION INTRAVENOUS ONCE
Status: COMPLETED | OUTPATIENT
Start: 2021-01-01 | End: 2021-01-01

## 2021-01-01 RX ORDER — FUROSEMIDE 10 MG/ML
40 INJECTION INTRAMUSCULAR; INTRAVENOUS 2 TIMES DAILY
Status: COMPLETED | OUTPATIENT
Start: 2021-01-01 | End: 2021-01-01

## 2021-01-01 RX ORDER — LEVOFLOXACIN 5 MG/ML
500 INJECTION, SOLUTION INTRAVENOUS ONCE
Status: COMPLETED | OUTPATIENT
Start: 2021-01-01 | End: 2021-01-01

## 2021-01-01 RX ORDER — INSULIN LISPRO 100 [IU]/ML
INJECTION, SOLUTION INTRAVENOUS; SUBCUTANEOUS
Qty: 10 PEN | Refills: 3 | Status: SHIPPED | OUTPATIENT
Start: 2021-01-01 | End: 2022-01-01 | Stop reason: SDUPTHER

## 2021-01-01 RX ORDER — ASPIRIN 81 MG/1
243 TABLET, CHEWABLE ORAL ONCE
Status: COMPLETED | OUTPATIENT
Start: 2021-01-01 | End: 2021-01-01

## 2021-01-01 RX ORDER — SODIUM CHLORIDE 9 MG/ML
25 INJECTION, SOLUTION INTRAVENOUS PRN
Status: DISCONTINUED | OUTPATIENT
Start: 2021-01-01 | End: 2021-01-01 | Stop reason: HOSPADM

## 2021-01-01 RX ORDER — GABAPENTIN 300 MG/1
600 CAPSULE ORAL 2 TIMES DAILY
Status: DISCONTINUED | OUTPATIENT
Start: 2021-01-01 | End: 2021-01-01 | Stop reason: HOSPADM

## 2021-01-01 RX ORDER — FOLIC ACID 1 MG/1
1 TABLET ORAL DAILY
Status: DISCONTINUED | OUTPATIENT
Start: 2021-01-01 | End: 2021-01-01 | Stop reason: HOSPADM

## 2021-01-01 RX ORDER — HYDRALAZINE HYDROCHLORIDE 50 MG/1
50 TABLET, FILM COATED ORAL EVERY 12 HOURS SCHEDULED
Status: DISCONTINUED | OUTPATIENT
Start: 2021-01-01 | End: 2021-01-01 | Stop reason: HOSPADM

## 2021-01-01 RX ORDER — HYDRALAZINE HYDROCHLORIDE 50 MG/1
50 TABLET, FILM COATED ORAL EVERY 8 HOURS SCHEDULED
Status: DISCONTINUED | OUTPATIENT
Start: 2021-01-01 | End: 2021-01-01 | Stop reason: HOSPADM

## 2021-01-01 RX ORDER — HYDRALAZINE HYDROCHLORIDE 50 MG/1
50 TABLET, FILM COATED ORAL EVERY 8 HOURS SCHEDULED
Status: DISCONTINUED | OUTPATIENT
Start: 2021-01-01 | End: 2021-01-01

## 2021-01-01 RX ORDER — INSULIN GLARGINE 100 [IU]/ML
50 INJECTION, SOLUTION SUBCUTANEOUS NIGHTLY
Status: DISCONTINUED | OUTPATIENT
Start: 2021-01-01 | End: 2021-01-01 | Stop reason: HOSPADM

## 2021-01-01 RX ORDER — ALBUTEROL SULFATE 90 UG/1
2 AEROSOL, METERED RESPIRATORY (INHALATION) PRN
Qty: 1 EACH | Refills: 3 | Status: SHIPPED | OUTPATIENT
Start: 2021-01-01 | End: 2022-01-01 | Stop reason: SDUPTHER

## 2021-01-01 RX ORDER — NEOMYCIN SULFATE, POLYMYXIN B SULFATE AND DEXAMETHASONE 3.5; 10000; 1 MG/ML; [USP'U]/ML; MG/ML
1 SUSPENSION/ DROPS OPHTHALMIC EVERY 6 HOURS SCHEDULED
Status: DISCONTINUED | OUTPATIENT
Start: 2021-01-01 | End: 2021-01-01 | Stop reason: HOSPADM

## 2021-01-01 RX ORDER — ATORVASTATIN CALCIUM 80 MG/1
80 TABLET, FILM COATED ORAL NIGHTLY
Status: DISCONTINUED | OUTPATIENT
Start: 2021-01-01 | End: 2021-01-01 | Stop reason: HOSPADM

## 2021-01-01 RX ORDER — DULAGLUTIDE 0.75 MG/.5ML
0.75 INJECTION, SOLUTION SUBCUTANEOUS WEEKLY
Qty: 4 PEN | Refills: 3 | Status: SHIPPED | OUTPATIENT
Start: 2021-01-01 | End: 2022-01-01 | Stop reason: SDUPTHER

## 2021-01-01 RX ORDER — DOXEPIN HYDROCHLORIDE 25 MG/1
25 CAPSULE ORAL NIGHTLY
COMMUNITY
Start: 2021-01-01

## 2021-01-01 RX ORDER — ASPIRIN 81 MG/1
81 TABLET ORAL DAILY
Status: DISCONTINUED | OUTPATIENT
Start: 2021-01-01 | End: 2021-01-01 | Stop reason: HOSPADM

## 2021-01-01 RX ORDER — AMITRIPTYLINE HYDROCHLORIDE 25 MG/1
25 TABLET, FILM COATED ORAL NIGHTLY
Status: DISCONTINUED | OUTPATIENT
Start: 2021-01-01 | End: 2021-01-01

## 2021-01-01 RX ORDER — FUROSEMIDE 10 MG/ML
40 INJECTION INTRAMUSCULAR; INTRAVENOUS ONCE
Status: COMPLETED | OUTPATIENT
Start: 2021-01-01 | End: 2021-01-01

## 2021-01-01 RX ORDER — PRAZOSIN HYDROCHLORIDE 2 MG/1
2 CAPSULE ORAL NIGHTLY
Status: DISCONTINUED | OUTPATIENT
Start: 2021-01-01 | End: 2021-01-01 | Stop reason: HOSPADM

## 2021-01-01 RX ORDER — MONTELUKAST SODIUM 10 MG/1
10 TABLET ORAL NIGHTLY
Qty: 30 TABLET | Refills: 5 | Status: SHIPPED | OUTPATIENT
Start: 2021-01-01 | End: 2021-01-01 | Stop reason: SDUPTHER

## 2021-01-01 RX ORDER — FUROSEMIDE 40 MG/1
40 TABLET ORAL DAILY
Status: DISCONTINUED | OUTPATIENT
Start: 2021-01-01 | End: 2021-01-01 | Stop reason: HOSPADM

## 2021-01-01 RX ORDER — MECLIZINE HYDROCHLORIDE 25 MG/1
25 TABLET ORAL 3 TIMES DAILY
Status: DISCONTINUED | OUTPATIENT
Start: 2021-01-01 | End: 2021-01-01 | Stop reason: HOSPADM

## 2021-01-01 RX ORDER — PEN NEEDLE, DIABETIC 31 GX5/16"
1 NEEDLE, DISPOSABLE MISCELLANEOUS 4 TIMES DAILY
Qty: 300 EACH | Refills: 3 | Status: ON HOLD | OUTPATIENT
Start: 2021-01-01 | End: 2022-01-01 | Stop reason: HOSPADM

## 2021-01-01 RX ORDER — ISOSORBIDE DINITRATE 20 MG/1
20 TABLET ORAL 3 TIMES DAILY
Status: DISCONTINUED | OUTPATIENT
Start: 2021-01-01 | End: 2021-01-01 | Stop reason: HOSPADM

## 2021-01-01 RX ORDER — ONDANSETRON 4 MG/1
4 TABLET, ORALLY DISINTEGRATING ORAL EVERY 8 HOURS PRN
Status: DISCONTINUED | OUTPATIENT
Start: 2021-01-01 | End: 2021-01-01 | Stop reason: HOSPADM

## 2021-01-01 RX ORDER — LEVOFLOXACIN 750 MG/1
750 TABLET ORAL DAILY
Qty: 7 TABLET | Refills: 0 | Status: ON HOLD | OUTPATIENT
Start: 2021-01-01 | End: 2021-01-01 | Stop reason: HOSPADM

## 2021-01-01 RX ORDER — ATORVASTATIN CALCIUM 80 MG/1
80 TABLET, FILM COATED ORAL NIGHTLY
COMMUNITY
Start: 2021-01-01

## 2021-01-01 RX ORDER — OXYBUTYNIN CHLORIDE 5 MG/1
5 TABLET, EXTENDED RELEASE ORAL NIGHTLY
Status: DISCONTINUED | OUTPATIENT
Start: 2021-01-01 | End: 2021-01-01 | Stop reason: HOSPADM

## 2021-01-01 RX ORDER — METOPROLOL SUCCINATE 100 MG/1
100 TABLET, EXTENDED RELEASE ORAL DAILY
Status: DISCONTINUED | OUTPATIENT
Start: 2021-01-01 | End: 2021-01-01 | Stop reason: HOSPADM

## 2021-01-01 RX ORDER — AMMONIUM LACTATE 12 G/100G
LOTION TOPICAL DAILY PRN
COMMUNITY
Start: 2021-01-01

## 2021-01-01 RX ORDER — PEN NEEDLE, DIABETIC 31 GX5/16"
1 NEEDLE, DISPOSABLE MISCELLANEOUS 4 TIMES DAILY
Qty: 300 EACH | Refills: 3 | Status: SHIPPED | OUTPATIENT
Start: 2021-01-01 | End: 2021-01-01 | Stop reason: SDUPTHER

## 2021-01-01 RX ORDER — DOXEPIN HYDROCHLORIDE 100 MG/1
CAPSULE ORAL
Status: ON HOLD | COMMUNITY
Start: 2021-01-01 | End: 2022-01-01 | Stop reason: HOSPADM

## 2021-01-01 RX ORDER — DEXTROSE MONOHYDRATE 50 MG/ML
100 INJECTION, SOLUTION INTRAVENOUS PRN
Status: DISCONTINUED | OUTPATIENT
Start: 2021-01-01 | End: 2021-01-01 | Stop reason: HOSPADM

## 2021-01-01 RX ORDER — HYDROXYZINE PAMOATE 25 MG/1
25 CAPSULE ORAL 3 TIMES DAILY
Status: DISCONTINUED | OUTPATIENT
Start: 2021-01-01 | End: 2021-01-01 | Stop reason: HOSPADM

## 2021-01-01 RX ORDER — IPRATROPIUM BROMIDE AND ALBUTEROL SULFATE 2.5; .5 MG/3ML; MG/3ML
1 SOLUTION RESPIRATORY (INHALATION) ONCE
Status: COMPLETED | OUTPATIENT
Start: 2021-01-01 | End: 2021-01-01

## 2021-01-01 RX ORDER — NICOTINE POLACRILEX 4 MG
15 LOZENGE BUCCAL PRN
Status: DISCONTINUED | OUTPATIENT
Start: 2021-01-01 | End: 2021-01-01 | Stop reason: HOSPADM

## 2021-01-01 RX ORDER — POLYETHYLENE GLYCOL 3350 17 G/17G
17 POWDER, FOR SOLUTION ORAL DAILY PRN
Status: DISCONTINUED | OUTPATIENT
Start: 2021-01-01 | End: 2021-01-01 | Stop reason: HOSPADM

## 2021-01-01 RX ORDER — ALBUTEROL SULFATE 2.5 MG/3ML
2.5 SOLUTION RESPIRATORY (INHALATION)
Status: DISCONTINUED | OUTPATIENT
Start: 2021-01-01 | End: 2021-01-01 | Stop reason: HOSPADM

## 2021-01-01 RX ORDER — SERTRALINE HYDROCHLORIDE 100 MG/1
200 TABLET, FILM COATED ORAL DAILY
Status: DISCONTINUED | OUTPATIENT
Start: 2021-01-01 | End: 2021-01-01 | Stop reason: HOSPADM

## 2021-01-01 RX ORDER — ASPIRIN 81 MG/1
81 TABLET, CHEWABLE ORAL DAILY
Status: DISCONTINUED | OUTPATIENT
Start: 2021-01-01 | End: 2021-01-01 | Stop reason: SDUPTHER

## 2021-01-01 RX ORDER — OXYCODONE HYDROCHLORIDE AND ACETAMINOPHEN 5; 325 MG/1; MG/1
1 TABLET ORAL EVERY 4 HOURS PRN
Status: ON HOLD | COMMUNITY
End: 2022-01-01 | Stop reason: HOSPADM

## 2021-01-01 RX ORDER — MECLIZINE HYDROCHLORIDE 25 MG/1
25 TABLET ORAL 2 TIMES DAILY
Status: DISCONTINUED | OUTPATIENT
Start: 2021-01-01 | End: 2021-01-01 | Stop reason: HOSPADM

## 2021-01-01 RX ORDER — HYDROXYZINE PAMOATE 25 MG/1
50 CAPSULE ORAL 3 TIMES DAILY PRN
Status: DISCONTINUED | OUTPATIENT
Start: 2021-01-01 | End: 2021-01-01 | Stop reason: HOSPADM

## 2021-01-01 RX ORDER — GABAPENTIN 600 MG/1
600 TABLET ORAL 2 TIMES DAILY
COMMUNITY

## 2021-01-01 RX ORDER — RANOLAZINE 500 MG/1
500 TABLET, EXTENDED RELEASE ORAL 2 TIMES DAILY
Status: DISCONTINUED | OUTPATIENT
Start: 2021-01-01 | End: 2021-01-01

## 2021-01-01 RX ORDER — HYDRALAZINE HYDROCHLORIDE 50 MG/1
50 TABLET, FILM COATED ORAL 3 TIMES DAILY
Status: DISCONTINUED | OUTPATIENT
Start: 2021-01-01 | End: 2021-01-01

## 2021-01-01 RX ORDER — ATORVASTATIN CALCIUM 40 MG/1
TABLET, FILM COATED ORAL
Status: ON HOLD | COMMUNITY
Start: 2021-01-01 | End: 2022-01-01 | Stop reason: HOSPADM

## 2021-01-01 RX ORDER — ONDANSETRON 2 MG/ML
4 INJECTION INTRAMUSCULAR; INTRAVENOUS EVERY 6 HOURS PRN
Status: DISCONTINUED | OUTPATIENT
Start: 2021-01-01 | End: 2021-01-01 | Stop reason: HOSPADM

## 2021-01-01 RX ORDER — ATORVASTATIN CALCIUM 40 MG/1
40 TABLET, FILM COATED ORAL NIGHTLY
Status: DISCONTINUED | OUTPATIENT
Start: 2021-01-01 | End: 2021-01-01 | Stop reason: SDUPTHER

## 2021-01-01 RX ORDER — FLASH GLUCOSE SCANNING READER
EACH MISCELLANEOUS
Qty: 2 EACH | Refills: 3 | Status: ON HOLD | OUTPATIENT
Start: 2021-01-01 | End: 2022-01-01 | Stop reason: HOSPADM

## 2021-01-01 RX ORDER — PRAZOSIN HYDROCHLORIDE 2 MG/1
2 CAPSULE ORAL NIGHTLY
COMMUNITY
Start: 2021-01-01

## 2021-01-01 RX ORDER — ALBUTEROL SULFATE 90 UG/1
2 AEROSOL, METERED RESPIRATORY (INHALATION) PRN
Qty: 1 INHALER | Refills: 5 | Status: SHIPPED | OUTPATIENT
Start: 2021-01-01 | End: 2021-01-01 | Stop reason: SDUPTHER

## 2021-01-01 RX ORDER — RANOLAZINE 500 MG/1
1000 TABLET, EXTENDED RELEASE ORAL 2 TIMES DAILY
Status: DISCONTINUED | OUTPATIENT
Start: 2021-01-01 | End: 2021-01-01 | Stop reason: HOSPADM

## 2021-01-01 RX ORDER — LEVOTHYROXINE SODIUM 0.05 MG/1
50 TABLET ORAL DAILY
Status: DISCONTINUED | OUTPATIENT
Start: 2021-01-01 | End: 2021-01-01 | Stop reason: HOSPADM

## 2021-01-01 RX ORDER — UBIQUINOL 100 MG
CAPSULE ORAL
Qty: 300 EACH | Refills: 6 | Status: ON HOLD | OUTPATIENT
Start: 2021-01-01 | End: 2022-01-01 | Stop reason: HOSPADM

## 2021-01-01 RX ORDER — FLASH GLUCOSE SENSOR
KIT MISCELLANEOUS
Qty: 2 EACH | Refills: 6 | Status: SHIPPED | OUTPATIENT
Start: 2021-01-01 | End: 2022-01-01 | Stop reason: SDUPTHER

## 2021-01-01 RX ORDER — ISOSORBIDE DINITRATE 20 MG/1
20 TABLET ORAL 3 TIMES DAILY
Status: DISCONTINUED | OUTPATIENT
Start: 2021-01-01 | End: 2021-01-01

## 2021-01-01 RX ORDER — INSULIN GLARGINE 100 [IU]/ML
INJECTION, SOLUTION SUBCUTANEOUS
Qty: 15 PEN | Refills: 3 | Status: SHIPPED | OUTPATIENT
Start: 2021-01-01 | End: 2022-01-01 | Stop reason: SDUPTHER

## 2021-01-01 RX ORDER — MONTELUKAST SODIUM 10 MG/1
10 TABLET ORAL NIGHTLY
Status: DISCONTINUED | OUTPATIENT
Start: 2021-01-01 | End: 2021-01-01 | Stop reason: HOSPADM

## 2021-01-01 RX ORDER — 0.9 % SODIUM CHLORIDE 0.9 %
300 INTRAVENOUS SOLUTION INTRAVENOUS ONCE
Status: COMPLETED | OUTPATIENT
Start: 2021-01-01 | End: 2021-01-01

## 2021-01-01 RX ORDER — RANOLAZINE 1000 MG/1
1000 TABLET, EXTENDED RELEASE ORAL 2 TIMES DAILY
Qty: 60 TABLET | Refills: 3 | Status: SHIPPED | OUTPATIENT
Start: 2021-01-01

## 2021-01-01 RX ORDER — DEXTROSE MONOHYDRATE 25 G/50ML
12.5 INJECTION, SOLUTION INTRAVENOUS PRN
Status: DISCONTINUED | OUTPATIENT
Start: 2021-01-01 | End: 2021-01-01 | Stop reason: HOSPADM

## 2021-01-01 RX ORDER — AMOXICILLIN AND CLAVULANATE POTASSIUM 875; 125 MG/1; MG/1
1 TABLET, FILM COATED ORAL 2 TIMES DAILY
Status: ON HOLD | COMMUNITY
End: 2021-01-01 | Stop reason: HOSPADM

## 2021-01-01 RX ORDER — BLOOD-GLUCOSE METER
1 KIT MISCELLANEOUS DAILY
Qty: 100 EACH | Refills: 3 | Status: ON HOLD | OUTPATIENT
Start: 2021-01-01 | End: 2022-01-01 | Stop reason: HOSPADM

## 2021-01-01 RX ORDER — LIDOCAINE 4 G/G
1 PATCH TOPICAL ONCE
Status: COMPLETED | OUTPATIENT
Start: 2021-01-01 | End: 2021-01-01

## 2021-01-01 RX ORDER — HYDROCODONE BITARTRATE AND ACETAMINOPHEN 5; 325 MG/1; MG/1
1 TABLET ORAL EVERY 6 HOURS PRN
Qty: 10 TABLET | Refills: 0 | Status: SHIPPED | OUTPATIENT
Start: 2021-01-01 | End: 2021-01-01

## 2021-01-01 RX ORDER — ISOSORBIDE MONONITRATE 30 MG/1
30 TABLET, EXTENDED RELEASE ORAL DAILY
Status: DISCONTINUED | OUTPATIENT
Start: 2021-01-01 | End: 2021-01-01 | Stop reason: HOSPADM

## 2021-01-01 RX ORDER — AMITRIPTYLINE HYDROCHLORIDE 25 MG/1
25 TABLET, FILM COATED ORAL NIGHTLY
Status: DISCONTINUED | OUTPATIENT
Start: 2021-01-01 | End: 2021-01-01 | Stop reason: HOSPADM

## 2021-01-01 RX ORDER — MONTELUKAST SODIUM 10 MG/1
10 TABLET ORAL NIGHTLY
Qty: 30 TABLET | Refills: 3 | Status: SHIPPED | OUTPATIENT
Start: 2021-01-01 | End: 2022-01-01 | Stop reason: SDUPTHER

## 2021-01-01 RX ORDER — DOCUSATE SODIUM 100 MG/1
100 CAPSULE, LIQUID FILLED ORAL 2 TIMES DAILY
Status: DISCONTINUED | OUTPATIENT
Start: 2021-01-01 | End: 2021-01-01 | Stop reason: HOSPADM

## 2021-01-01 RX ORDER — LEVOTHYROXINE SODIUM 0.05 MG/1
TABLET ORAL
Qty: 30 TABLET | Refills: 5 | Status: SHIPPED | OUTPATIENT
Start: 2021-01-01 | End: 2022-01-01 | Stop reason: SDUPTHER

## 2021-01-01 RX ORDER — LOPERAMIDE HYDROCHLORIDE 2 MG/1
2 CAPSULE ORAL 4 TIMES DAILY PRN
Qty: 40 CAPSULE | Refills: 0 | Status: SHIPPED | OUTPATIENT
Start: 2021-01-01 | End: 2021-01-01

## 2021-01-01 RX ORDER — LEVOFLOXACIN 5 MG/ML
250 INJECTION, SOLUTION INTRAVENOUS EVERY 24 HOURS
Status: DISCONTINUED | OUTPATIENT
Start: 2021-01-01 | End: 2021-01-01 | Stop reason: HOSPADM

## 2021-01-01 RX ORDER — ALBUTEROL SULFATE 2.5 MG/3ML
2.5 SOLUTION RESPIRATORY (INHALATION) EVERY 6 HOURS PRN
Qty: 120 EACH | Refills: 3 | Status: SHIPPED | OUTPATIENT
Start: 2021-01-01 | End: 2022-01-01 | Stop reason: SDUPTHER

## 2021-01-01 RX ORDER — HALOPERIDOL 2 MG/1
5 TABLET ORAL DAILY
Status: DISCONTINUED | OUTPATIENT
Start: 2021-01-01 | End: 2021-01-01 | Stop reason: HOSPADM

## 2021-01-01 RX ORDER — FUROSEMIDE 40 MG/1
40 TABLET ORAL DAILY
Status: DISCONTINUED | OUTPATIENT
Start: 2021-01-01 | End: 2021-01-01

## 2021-01-01 RX ORDER — ACETAMINOPHEN 650 MG/1
650 SUPPOSITORY RECTAL EVERY 6 HOURS PRN
Status: DISCONTINUED | OUTPATIENT
Start: 2021-01-01 | End: 2021-01-01 | Stop reason: HOSPADM

## 2021-01-01 RX ORDER — ONDANSETRON 2 MG/ML
4 INJECTION INTRAMUSCULAR; INTRAVENOUS ONCE
Status: COMPLETED | OUTPATIENT
Start: 2021-01-01 | End: 2021-01-01

## 2021-01-01 RX ORDER — MORPHINE SULFATE 2 MG/ML
4 INJECTION, SOLUTION INTRAMUSCULAR; INTRAVENOUS
Status: DISCONTINUED | OUTPATIENT
Start: 2021-01-01 | End: 2021-01-01 | Stop reason: HOSPADM

## 2021-01-01 RX ORDER — DULAGLUTIDE 0.75 MG/.5ML
0.75 INJECTION, SOLUTION SUBCUTANEOUS WEEKLY
Qty: 4 PEN | Refills: 3 | Status: SHIPPED | OUTPATIENT
Start: 2021-01-01 | End: 2021-01-01 | Stop reason: SDUPTHER

## 2021-01-01 RX ORDER — SODIUM CHLORIDE 0.9 % (FLUSH) 0.9 %
5-40 SYRINGE (ML) INJECTION PRN
Status: DISCONTINUED | OUTPATIENT
Start: 2021-01-01 | End: 2021-01-01 | Stop reason: HOSPADM

## 2021-01-01 RX ORDER — LANCETS 28 GAUGE
1 EACH MISCELLANEOUS DAILY
Qty: 100 EACH | Refills: 3 | Status: ON HOLD | OUTPATIENT
Start: 2021-01-01 | End: 2022-01-01 | Stop reason: HOSPADM

## 2021-01-01 RX ORDER — ONDANSETRON 4 MG/1
4 TABLET, ORALLY DISINTEGRATING ORAL 3 TIMES DAILY PRN
Qty: 21 TABLET | Refills: 0 | Status: SHIPPED | OUTPATIENT
Start: 2021-01-01 | End: 2022-01-01

## 2021-01-01 RX ORDER — HALOPERIDOL 5 MG
5 TABLET ORAL DAILY
Status: DISCONTINUED | OUTPATIENT
Start: 2021-01-01 | End: 2021-01-01 | Stop reason: HOSPADM

## 2021-01-01 RX ORDER — AMOXICILLIN AND CLAVULANATE POTASSIUM 875; 125 MG/1; MG/1
1 TABLET, FILM COATED ORAL 2 TIMES DAILY
Qty: 14 TABLET | Refills: 0 | Status: SHIPPED | OUTPATIENT
Start: 2021-01-01 | End: 2021-01-01

## 2021-01-01 RX ORDER — INSULIN GLARGINE 100 [IU]/ML
INJECTION, SOLUTION SUBCUTANEOUS
Qty: 15 PEN | Refills: 3 | Status: SHIPPED | OUTPATIENT
Start: 2021-01-01 | End: 2021-01-01 | Stop reason: SDUPTHER

## 2021-01-01 RX ORDER — METOPROLOL SUCCINATE 50 MG/1
50 TABLET, EXTENDED RELEASE ORAL 2 TIMES DAILY
Status: COMPLETED | OUTPATIENT
Start: 2021-01-01 | End: 2021-01-01

## 2021-01-01 RX ORDER — FLASH GLUCOSE SENSOR
KIT MISCELLANEOUS
Qty: 2 EACH | Refills: 6 | Status: ON HOLD | OUTPATIENT
Start: 2021-01-01 | End: 2022-01-01 | Stop reason: HOSPADM

## 2021-01-01 RX ORDER — MORPHINE SULFATE 2 MG/ML
2 INJECTION, SOLUTION INTRAMUSCULAR; INTRAVENOUS
Status: DISCONTINUED | OUTPATIENT
Start: 2021-01-01 | End: 2021-01-01 | Stop reason: HOSPADM

## 2021-01-01 RX ORDER — ATORVASTATIN CALCIUM 40 MG/1
80 TABLET, FILM COATED ORAL DAILY
Status: ON HOLD | COMMUNITY
End: 2021-01-01

## 2021-01-01 RX ORDER — BUSPIRONE HYDROCHLORIDE 10 MG/1
10 TABLET ORAL 2 TIMES DAILY
COMMUNITY
Start: 2021-01-01

## 2021-01-01 RX ORDER — ACETAMINOPHEN 325 MG/1
650 TABLET ORAL EVERY 6 HOURS PRN
Status: DISCONTINUED | OUTPATIENT
Start: 2021-01-01 | End: 2021-01-01 | Stop reason: HOSPADM

## 2021-01-01 RX ORDER — SODIUM CHLORIDE 0.9 % (FLUSH) 0.9 %
5-40 SYRINGE (ML) INJECTION EVERY 12 HOURS SCHEDULED
Status: DISCONTINUED | OUTPATIENT
Start: 2021-01-01 | End: 2021-01-01 | Stop reason: HOSPADM

## 2021-01-01 RX ORDER — ASPIRIN 81 MG/1
324 TABLET, CHEWABLE ORAL ONCE
Status: COMPLETED | OUTPATIENT
Start: 2021-01-01 | End: 2021-01-01

## 2021-01-01 RX ORDER — ALBUTEROL SULFATE 2.5 MG/3ML
2.5 SOLUTION RESPIRATORY (INHALATION) EVERY 6 HOURS PRN
Qty: 120 EACH | Refills: 5 | Status: SHIPPED | OUTPATIENT
Start: 2021-01-01 | End: 2021-01-01 | Stop reason: SDUPTHER

## 2021-01-01 RX ORDER — LORAZEPAM 1 MG/1
1 TABLET ORAL ONCE
Status: COMPLETED | OUTPATIENT
Start: 2021-01-01 | End: 2021-01-01

## 2021-01-01 RX ORDER — METOPROLOL SUCCINATE 50 MG/1
50 TABLET, EXTENDED RELEASE ORAL DAILY
Status: DISCONTINUED | OUTPATIENT
Start: 2021-01-01 | End: 2021-01-01 | Stop reason: HOSPADM

## 2021-01-01 RX ORDER — LEVOTHYROXINE SODIUM 0.05 MG/1
TABLET ORAL
Qty: 30 TABLET | Refills: 5 | Status: SHIPPED | OUTPATIENT
Start: 2021-01-01 | End: 2021-01-01 | Stop reason: SDUPTHER

## 2021-01-01 RX ORDER — METOPROLOL SUCCINATE 50 MG/1
50 TABLET, EXTENDED RELEASE ORAL 2 TIMES DAILY
Status: DISCONTINUED | OUTPATIENT
Start: 2021-01-01 | End: 2021-01-01

## 2021-01-01 RX ORDER — METOPROLOL SUCCINATE 50 MG/1
50 TABLET, EXTENDED RELEASE ORAL DAILY
Status: DISCONTINUED | OUTPATIENT
Start: 2021-01-01 | End: 2021-01-01 | Stop reason: SDUPTHER

## 2021-01-01 RX ADMIN — INSULIN GLARGINE 50 UNITS: 100 INJECTION, SOLUTION SUBCUTANEOUS at 22:11

## 2021-01-01 RX ADMIN — INSULIN GLARGINE 50 UNITS: 100 INJECTION, SOLUTION SUBCUTANEOUS at 21:16

## 2021-01-01 RX ADMIN — HYDROXYZINE PAMOATE 25 MG: 25 CAPSULE ORAL at 14:21

## 2021-01-01 RX ADMIN — INSULIN GLARGINE 50 UNITS: 100 INJECTION, SOLUTION SUBCUTANEOUS at 22:49

## 2021-01-01 RX ADMIN — METOPROLOL SUCCINATE 100 MG: 100 TABLET, EXTENDED RELEASE ORAL at 11:21

## 2021-01-01 RX ADMIN — HALOPERIDOL 5 MG: 2 TABLET ORAL at 11:11

## 2021-01-01 RX ADMIN — GABAPENTIN 600 MG: 300 CAPSULE ORAL at 11:20

## 2021-01-01 RX ADMIN — INSULIN LISPRO 2 UNITS: 100 INJECTION, SOLUTION INTRAVENOUS; SUBCUTANEOUS at 17:04

## 2021-01-01 RX ADMIN — HYDRALAZINE HYDROCHLORIDE 50 MG: 50 TABLET, FILM COATED ORAL at 21:45

## 2021-01-01 RX ADMIN — PRAZOSIN HYDROCHLORIDE 2 MG: 2 CAPSULE ORAL at 22:05

## 2021-01-01 RX ADMIN — RANOLAZINE 1000 MG: 500 TABLET, FILM COATED, EXTENDED RELEASE ORAL at 20:02

## 2021-01-01 RX ADMIN — MONTELUKAST 10 MG: 10 TABLET, FILM COATED ORAL at 20:51

## 2021-01-01 RX ADMIN — GABAPENTIN 600 MG: 300 CAPSULE ORAL at 20:50

## 2021-01-01 RX ADMIN — FAMOTIDINE 20 MG: 10 INJECTION, SOLUTION INTRAVENOUS at 12:33

## 2021-01-01 RX ADMIN — RANOLAZINE 1000 MG: 500 TABLET, FILM COATED, EXTENDED RELEASE ORAL at 10:40

## 2021-01-01 RX ADMIN — DOCUSATE SODIUM 100 MG: 100 CAPSULE ORAL at 14:58

## 2021-01-01 RX ADMIN — Medication 10 ML: at 20:02

## 2021-01-01 RX ADMIN — HYDRALAZINE HYDROCHLORIDE 50 MG: 50 TABLET, FILM COATED ORAL at 21:14

## 2021-01-01 RX ADMIN — FOLIC ACID 1 MG: 1 TABLET ORAL at 10:09

## 2021-01-01 RX ADMIN — TICAGRELOR 90 MG: 90 TABLET ORAL at 10:40

## 2021-01-01 RX ADMIN — MECLIZINE HYDROCHLORIDE 25 MG: 25 TABLET ORAL at 20:02

## 2021-01-01 RX ADMIN — ASPIRIN 243 MG: 81 TABLET, CHEWABLE ORAL at 20:16

## 2021-01-01 RX ADMIN — SERTRALINE 200 MG: 100 TABLET, FILM COATED ORAL at 11:13

## 2021-01-01 RX ADMIN — ASPIRIN 81 MG: 81 TABLET, COATED ORAL at 08:22

## 2021-01-01 RX ADMIN — HYDRALAZINE HYDROCHLORIDE 50 MG: 50 TABLET, FILM COATED ORAL at 08:19

## 2021-01-01 RX ADMIN — ONDANSETRON 4 MG: 2 INJECTION INTRAMUSCULAR; INTRAVENOUS at 13:52

## 2021-01-01 RX ADMIN — GABAPENTIN 600 MG: 300 CAPSULE ORAL at 10:40

## 2021-01-01 RX ADMIN — GABAPENTIN 600 MG: 300 CAPSULE ORAL at 08:10

## 2021-01-01 RX ADMIN — MONTELUKAST 10 MG: 10 TABLET, FILM COATED ORAL at 20:02

## 2021-01-01 RX ADMIN — AMITRIPTYLINE HYDROCHLORIDE 25 MG: 25 TABLET, FILM COATED ORAL at 22:05

## 2021-01-01 RX ADMIN — LEVOTHYROXINE SODIUM 50 MCG: 0.05 TABLET ORAL at 06:15

## 2021-01-01 RX ADMIN — SODIUM CHLORIDE 1000 ML: 9 INJECTION, SOLUTION INTRAVENOUS at 22:18

## 2021-01-01 RX ADMIN — FUROSEMIDE 40 MG: 40 TABLET ORAL at 08:10

## 2021-01-01 RX ADMIN — HALOPERIDOL 5 MG: 5 TABLET ORAL at 08:33

## 2021-01-01 RX ADMIN — MECLIZINE HYDROCHLORIDE 25 MG: 25 TABLET ORAL at 20:35

## 2021-01-01 RX ADMIN — HALOPERIDOL 5 MG: 5 TABLET ORAL at 08:21

## 2021-01-01 RX ADMIN — LEVOTHYROXINE SODIUM 50 MCG: 0.05 TABLET ORAL at 14:57

## 2021-01-01 RX ADMIN — ISOSORBIDE DINITRATE 20 MG: 20 TABLET ORAL at 10:07

## 2021-01-01 RX ADMIN — MECLIZINE HYDROCHLORIDE 25 MG: 25 TABLET ORAL at 15:42

## 2021-01-01 RX ADMIN — HYDROXYZINE PAMOATE 25 MG: 25 CAPSULE ORAL at 11:20

## 2021-01-01 RX ADMIN — NEOMYCIN SULFATE, POLYMYXIN B SULFATE AND DEXAMETHASONE 1 DROP: 3.5; 10000; 1 SUSPENSION/ DROPS OPHTHALMIC at 16:46

## 2021-01-01 RX ADMIN — Medication 10 ML: at 22:27

## 2021-01-01 RX ADMIN — RANOLAZINE 1000 MG: 500 TABLET, FILM COATED, EXTENDED RELEASE ORAL at 21:45

## 2021-01-01 RX ADMIN — NEOMYCIN SULFATE, POLYMYXIN B SULFATE AND DEXAMETHASONE 1 DROP: 3.5; 10000; 1 SUSPENSION/ DROPS OPHTHALMIC at 00:20

## 2021-01-01 RX ADMIN — INSULIN LISPRO 2 UNITS: 100 INJECTION, SOLUTION INTRAVENOUS; SUBCUTANEOUS at 08:18

## 2021-01-01 RX ADMIN — ATORVASTATIN CALCIUM 80 MG: 80 TABLET, FILM COATED ORAL at 20:02

## 2021-01-01 RX ADMIN — Medication 10 ML: at 10:07

## 2021-01-01 RX ADMIN — NEOMYCIN SULFATE, POLYMYXIN B SULFATE AND DEXAMETHASONE 1 DROP: 3.5; 10000; 1 SUSPENSION/ DROPS OPHTHALMIC at 20:37

## 2021-01-01 RX ADMIN — FUROSEMIDE 40 MG: 10 INJECTION INTRAMUSCULAR; INTRAVENOUS at 10:06

## 2021-01-01 RX ADMIN — ATORVASTATIN CALCIUM 80 MG: 80 TABLET, FILM COATED ORAL at 21:14

## 2021-01-01 RX ADMIN — HYDRALAZINE HYDROCHLORIDE 50 MG: 50 TABLET, FILM COATED ORAL at 22:35

## 2021-01-01 RX ADMIN — TICAGRELOR 90 MG: 90 TABLET ORAL at 08:28

## 2021-01-01 RX ADMIN — METOPROLOL SUCCINATE 100 MG: 100 TABLET, EXTENDED RELEASE ORAL at 08:21

## 2021-01-01 RX ADMIN — MECLIZINE HYDROCHLORIDE 25 MG: 25 TABLET ORAL at 20:51

## 2021-01-01 RX ADMIN — SODIUM CHLORIDE 300 ML: 9 INJECTION, SOLUTION INTRAVENOUS at 21:31

## 2021-01-01 RX ADMIN — TETROFOSMIN 31.3 MILLICURIE: 1.38 INJECTION, POWDER, LYOPHILIZED, FOR SOLUTION INTRAVENOUS at 13:17

## 2021-01-01 RX ADMIN — SODIUM CHLORIDE, PRESERVATIVE FREE 10 ML: 5 INJECTION INTRAVENOUS at 00:13

## 2021-01-01 RX ADMIN — INSULIN LISPRO 4 UNITS: 100 INJECTION, SOLUTION INTRAVENOUS; SUBCUTANEOUS at 14:05

## 2021-01-01 RX ADMIN — HYDROXYZINE PAMOATE 25 MG: 25 CAPSULE ORAL at 22:06

## 2021-01-01 RX ADMIN — FUROSEMIDE 40 MG: 40 TABLET ORAL at 08:21

## 2021-01-01 RX ADMIN — RANOLAZINE 1000 MG: 500 TABLET, FILM COATED, EXTENDED RELEASE ORAL at 11:12

## 2021-01-01 RX ADMIN — IOPAMIDOL 100 ML: 755 INJECTION, SOLUTION INTRAVENOUS at 20:39

## 2021-01-01 RX ADMIN — TICAGRELOR 90 MG: 90 TABLET ORAL at 08:14

## 2021-01-01 RX ADMIN — GABAPENTIN 600 MG: 300 CAPSULE ORAL at 08:21

## 2021-01-01 RX ADMIN — Medication 10 ML: at 08:22

## 2021-01-01 RX ADMIN — ASPIRIN 81 MG: 81 TABLET, COATED ORAL at 10:07

## 2021-01-01 RX ADMIN — HYDROXYZINE PAMOATE 25 MG: 25 CAPSULE ORAL at 08:14

## 2021-01-01 RX ADMIN — DOCUSATE SODIUM 100 MG: 100 CAPSULE ORAL at 08:21

## 2021-01-01 RX ADMIN — FOLIC ACID 1 MG: 1 TABLET ORAL at 11:19

## 2021-01-01 RX ADMIN — LEVOTHYROXINE SODIUM 50 MCG: 0.05 TABLET ORAL at 05:14

## 2021-01-01 RX ADMIN — MONTELUKAST 10 MG: 10 TABLET, FILM COATED ORAL at 20:35

## 2021-01-01 RX ADMIN — SERTRALINE 200 MG: 100 TABLET, FILM COATED ORAL at 08:28

## 2021-01-01 RX ADMIN — LEVOTHYROXINE SODIUM 50 MCG: 0.05 TABLET ORAL at 05:34

## 2021-01-01 RX ADMIN — TICAGRELOR 90 MG: 90 TABLET ORAL at 22:06

## 2021-01-01 RX ADMIN — ASPIRIN 81 MG: 81 TABLET, COATED ORAL at 11:21

## 2021-01-01 RX ADMIN — TETROFOSMIN 11.9 MILLICURIE: 1.38 INJECTION, POWDER, LYOPHILIZED, FOR SOLUTION INTRAVENOUS at 11:39

## 2021-01-01 RX ADMIN — MORPHINE SULFATE 4 MG: 2 INJECTION, SOLUTION INTRAMUSCULAR; INTRAVENOUS at 12:32

## 2021-01-01 RX ADMIN — SERTRALINE 200 MG: 100 TABLET, FILM COATED ORAL at 10:40

## 2021-01-01 RX ADMIN — ASPIRIN 81 MG: 81 TABLET, COATED ORAL at 08:27

## 2021-01-01 RX ADMIN — ASPIRIN 81 MG: 81 TABLET, COATED ORAL at 10:40

## 2021-01-01 RX ADMIN — INSULIN LISPRO 4 UNITS: 100 INJECTION, SOLUTION INTRAVENOUS; SUBCUTANEOUS at 16:54

## 2021-01-01 RX ADMIN — METOPROLOL SUCCINATE 50 MG: 50 TABLET, EXTENDED RELEASE ORAL at 11:13

## 2021-01-01 RX ADMIN — AMITRIPTYLINE HYDROCHLORIDE 25 MG: 25 TABLET, FILM COATED ORAL at 20:34

## 2021-01-01 RX ADMIN — OXYBUTYNIN CHLORIDE 5 MG: 5 TABLET, EXTENDED RELEASE ORAL at 00:39

## 2021-01-01 RX ADMIN — LEVOFLOXACIN 250 MG: 5 INJECTION, SOLUTION INTRAVENOUS at 00:17

## 2021-01-01 RX ADMIN — MECLIZINE HYDROCHLORIDE 25 MG: 25 TABLET ORAL at 14:42

## 2021-01-01 RX ADMIN — ATORVASTATIN CALCIUM 80 MG: 80 TABLET, FILM COATED ORAL at 22:06

## 2021-01-01 RX ADMIN — INSULIN LISPRO 2 UNITS: 100 INJECTION, SOLUTION INTRAVENOUS; SUBCUTANEOUS at 16:46

## 2021-01-01 RX ADMIN — ISOSORBIDE DINITRATE 20 MG: 20 TABLET ORAL at 08:22

## 2021-01-01 RX ADMIN — NEOMYCIN SULFATE, POLYMYXIN B SULFATE AND DEXAMETHASONE 1 DROP: 3.5; 10000; 1 SUSPENSION/ DROPS OPHTHALMIC at 12:08

## 2021-01-01 RX ADMIN — METOPROLOL SUCCINATE 100 MG: 100 TABLET, EXTENDED RELEASE ORAL at 10:41

## 2021-01-01 RX ADMIN — HYDRALAZINE HYDROCHLORIDE 50 MG: 50 TABLET, FILM COATED ORAL at 16:53

## 2021-01-01 RX ADMIN — GABAPENTIN 600 MG: 300 CAPSULE ORAL at 08:22

## 2021-01-01 RX ADMIN — LEVOTHYROXINE SODIUM 50 MCG: 0.05 TABLET ORAL at 05:04

## 2021-01-01 RX ADMIN — MECLIZINE HYDROCHLORIDE 25 MG: 25 TABLET ORAL at 08:20

## 2021-01-01 RX ADMIN — RANOLAZINE 1000 MG: 500 TABLET, FILM COATED, EXTENDED RELEASE ORAL at 20:34

## 2021-01-01 RX ADMIN — FUROSEMIDE 40 MG: 10 INJECTION, SOLUTION INTRAMUSCULAR; INTRAVENOUS at 00:09

## 2021-01-01 RX ADMIN — RANOLAZINE 1000 MG: 500 TABLET, FILM COATED, EXTENDED RELEASE ORAL at 08:21

## 2021-01-01 RX ADMIN — Medication 10 ML: at 08:20

## 2021-01-01 RX ADMIN — TICAGRELOR 90 MG: 90 TABLET ORAL at 08:22

## 2021-01-01 RX ADMIN — INSULIN LISPRO 2 UNITS: 100 INJECTION, SOLUTION INTRAVENOUS; SUBCUTANEOUS at 18:20

## 2021-01-01 RX ADMIN — FOLIC ACID 1 MG: 1 TABLET ORAL at 08:21

## 2021-01-01 RX ADMIN — ASPIRIN 81 MG: 81 TABLET, COATED ORAL at 08:10

## 2021-01-01 RX ADMIN — METOPROLOL SUCCINATE 100 MG: 100 TABLET, EXTENDED RELEASE ORAL at 08:29

## 2021-01-01 RX ADMIN — PRAZOSIN HYDROCHLORIDE 2 MG: 2 CAPSULE ORAL at 20:58

## 2021-01-01 RX ADMIN — DOCUSATE SODIUM 100 MG: 100 CAPSULE ORAL at 10:07

## 2021-01-01 RX ADMIN — TICAGRELOR 90 MG: 90 TABLET ORAL at 11:19

## 2021-01-01 RX ADMIN — HYDROXYZINE PAMOATE 25 MG: 25 CAPSULE ORAL at 14:51

## 2021-01-01 RX ADMIN — GABAPENTIN 600 MG: 300 CAPSULE ORAL at 21:45

## 2021-01-01 RX ADMIN — GABAPENTIN 600 MG: 300 CAPSULE ORAL at 10:07

## 2021-01-01 RX ADMIN — TICAGRELOR 90 MG: 90 TABLET ORAL at 11:13

## 2021-01-01 RX ADMIN — NEOMYCIN SULFATE, POLYMYXIN B SULFATE AND DEXAMETHASONE 1 DROP: 3.5; 10000; 1 SUSPENSION/ DROPS OPHTHALMIC at 06:15

## 2021-01-01 RX ADMIN — LEVOTHYROXINE SODIUM 50 MCG: 0.05 TABLET ORAL at 06:03

## 2021-01-01 RX ADMIN — ISOSORBIDE MONONITRATE 30 MG: 30 TABLET, EXTENDED RELEASE ORAL at 11:21

## 2021-01-01 RX ADMIN — AMITRIPTYLINE HYDROCHLORIDE 25 MG: 25 TABLET, FILM COATED ORAL at 20:02

## 2021-01-01 RX ADMIN — FUROSEMIDE 40 MG: 10 INJECTION INTRAMUSCULAR; INTRAVENOUS at 17:10

## 2021-01-01 RX ADMIN — MECLIZINE HYDROCHLORIDE 25 MG: 25 TABLET ORAL at 14:51

## 2021-01-01 RX ADMIN — HYDRALAZINE HYDROCHLORIDE 50 MG: 50 TABLET, FILM COATED ORAL at 08:21

## 2021-01-01 RX ADMIN — MECLIZINE HYDROCHLORIDE 25 MG: 25 TABLET ORAL at 11:13

## 2021-01-01 RX ADMIN — ASPIRIN 324 MG: 81 TABLET, CHEWABLE ORAL at 00:06

## 2021-01-01 RX ADMIN — HYDRALAZINE HYDROCHLORIDE 50 MG: 50 TABLET, FILM COATED ORAL at 10:06

## 2021-01-01 RX ADMIN — INSULIN GLARGINE 50 UNITS: 100 INJECTION, SOLUTION SUBCUTANEOUS at 22:01

## 2021-01-01 RX ADMIN — MECLIZINE HYDROCHLORIDE 25 MG: 25 TABLET ORAL at 08:14

## 2021-01-01 RX ADMIN — HYDRALAZINE HYDROCHLORIDE 50 MG: 50 TABLET, FILM COATED ORAL at 11:19

## 2021-01-01 RX ADMIN — ISOSORBIDE MONONITRATE 30 MG: 30 TABLET, EXTENDED RELEASE ORAL at 10:41

## 2021-01-01 RX ADMIN — HALOPERIDOL 5 MG: 5 TABLET ORAL at 11:20

## 2021-01-01 RX ADMIN — SERTRALINE 200 MG: 100 TABLET, FILM COATED ORAL at 08:14

## 2021-01-01 RX ADMIN — FOLIC ACID 1 MG: 1 TABLET ORAL at 08:28

## 2021-01-01 RX ADMIN — ENOXAPARIN SODIUM 40 MG: 40 INJECTION SUBCUTANEOUS at 11:19

## 2021-01-01 RX ADMIN — HYDROMORPHONE HYDROCHLORIDE 1 MG: 1 INJECTION, SOLUTION INTRAMUSCULAR; INTRAVENOUS; SUBCUTANEOUS at 13:54

## 2021-01-01 RX ADMIN — NEOMYCIN SULFATE, POLYMYXIN B SULFATE AND DEXAMETHASONE 1 DROP: 3.5; 10000; 1 SUSPENSION/ DROPS OPHTHALMIC at 13:46

## 2021-01-01 RX ADMIN — HYDROXYZINE PAMOATE 25 MG: 25 CAPSULE ORAL at 20:34

## 2021-01-01 RX ADMIN — ASPIRIN 81 MG: 81 TABLET, COATED ORAL at 11:15

## 2021-01-01 RX ADMIN — Medication 10 ML: at 08:30

## 2021-01-01 RX ADMIN — GABAPENTIN 600 MG: 300 CAPSULE ORAL at 20:34

## 2021-01-01 RX ADMIN — NEOMYCIN SULFATE, POLYMYXIN B SULFATE AND DEXAMETHASONE 1 DROP: 3.5; 10000; 1 SUSPENSION/ DROPS OPHTHALMIC at 11:19

## 2021-01-01 RX ADMIN — TICAGRELOR 90 MG: 90 TABLET ORAL at 20:34

## 2021-01-01 RX ADMIN — ENOXAPARIN SODIUM 40 MG: 100 INJECTION SUBCUTANEOUS at 08:19

## 2021-01-01 RX ADMIN — GABAPENTIN 600 MG: 300 CAPSULE ORAL at 14:58

## 2021-01-01 RX ADMIN — LEVOFLOXACIN 250 MG: 5 INJECTION, SOLUTION INTRAVENOUS at 23:53

## 2021-01-01 RX ADMIN — FOLIC ACID 1 MG: 1 TABLET ORAL at 10:41

## 2021-01-01 RX ADMIN — ENOXAPARIN SODIUM 40 MG: 100 INJECTION SUBCUTANEOUS at 11:14

## 2021-01-01 RX ADMIN — MECLIZINE HYDROCHLORIDE 25 MG: 25 TABLET ORAL at 22:06

## 2021-01-01 RX ADMIN — DOCUSATE SODIUM 100 MG: 100 CAPSULE ORAL at 21:45

## 2021-01-01 RX ADMIN — Medication 10 ML: at 21:21

## 2021-01-01 RX ADMIN — HALOPERIDOL 5 MG: 2 TABLET ORAL at 14:59

## 2021-01-01 RX ADMIN — AMITRIPTYLINE HYDROCHLORIDE 25 MG: 25 TABLET, FILM COATED ORAL at 20:51

## 2021-01-01 RX ADMIN — OXYBUTYNIN CHLORIDE 5 MG: 5 TABLET, EXTENDED RELEASE ORAL at 21:14

## 2021-01-01 RX ADMIN — ONDANSETRON 4 MG: 2 INJECTION INTRAMUSCULAR; INTRAVENOUS at 20:16

## 2021-01-01 RX ADMIN — HYDROMORPHONE HYDROCHLORIDE 1 MG: 1 INJECTION, SOLUTION INTRAMUSCULAR; INTRAVENOUS; SUBCUTANEOUS at 22:18

## 2021-01-01 RX ADMIN — DOCUSATE SODIUM 100 MG: 100 CAPSULE ORAL at 21:14

## 2021-01-01 RX ADMIN — ONDANSETRON 4 MG: 2 INJECTION INTRAMUSCULAR; INTRAVENOUS at 22:18

## 2021-01-01 RX ADMIN — SERTRALINE 200 MG: 100 TABLET, FILM COATED ORAL at 14:58

## 2021-01-01 RX ADMIN — SERTRALINE 200 MG: 100 TABLET, FILM COATED ORAL at 08:21

## 2021-01-01 RX ADMIN — MORPHINE SULFATE 2 MG: 2 INJECTION, SOLUTION INTRAMUSCULAR; INTRAVENOUS at 20:17

## 2021-01-01 RX ADMIN — INSULIN LISPRO 2 UNITS: 100 INJECTION, SOLUTION INTRAVENOUS; SUBCUTANEOUS at 08:25

## 2021-01-01 RX ADMIN — Medication 10 ML: at 10:13

## 2021-01-01 RX ADMIN — METOPROLOL SUCCINATE 50 MG: 50 TABLET, EXTENDED RELEASE ORAL at 10:06

## 2021-01-01 RX ADMIN — LEVOFLOXACIN 250 MG: 5 INJECTION, SOLUTION INTRAVENOUS at 00:35

## 2021-01-01 RX ADMIN — ISOSORBIDE DINITRATE 20 MG: 20 TABLET ORAL at 08:15

## 2021-01-01 RX ADMIN — INSULIN GLARGINE 50 UNITS: 100 INJECTION, SOLUTION SUBCUTANEOUS at 21:52

## 2021-01-01 RX ADMIN — TICAGRELOR 90 MG: 90 TABLET ORAL at 20:02

## 2021-01-01 RX ADMIN — HYDROXYZINE PAMOATE 25 MG: 25 CAPSULE ORAL at 14:42

## 2021-01-01 RX ADMIN — MECLIZINE HYDROCHLORIDE 25 MG: 25 TABLET ORAL at 11:20

## 2021-01-01 RX ADMIN — MONTELUKAST 10 MG: 10 TABLET, FILM COATED ORAL at 21:45

## 2021-01-01 RX ADMIN — ISOSORBIDE DINITRATE 20 MG: 20 TABLET ORAL at 21:14

## 2021-01-01 RX ADMIN — GABAPENTIN 600 MG: 300 CAPSULE ORAL at 20:02

## 2021-01-01 RX ADMIN — FOLIC ACID 1 MG: 1 TABLET ORAL at 08:10

## 2021-01-01 RX ADMIN — MECLIZINE HYDROCHLORIDE 25 MG: 25 TABLET ORAL at 10:12

## 2021-01-01 RX ADMIN — Medication 10 ML: at 22:35

## 2021-01-01 RX ADMIN — REGADENOSON 0.4 MG: 0.08 INJECTION, SOLUTION INTRAVENOUS at 13:13

## 2021-01-01 RX ADMIN — INSULIN LISPRO 4 UNITS: 100 INJECTION, SOLUTION INTRAVENOUS; SUBCUTANEOUS at 16:35

## 2021-01-01 RX ADMIN — NEOMYCIN SULFATE, POLYMYXIN B SULFATE AND DEXAMETHASONE 1 DROP: 3.5; 10000; 1 SUSPENSION/ DROPS OPHTHALMIC at 23:57

## 2021-01-01 RX ADMIN — ISOSORBIDE MONONITRATE 30 MG: 30 TABLET, EXTENDED RELEASE ORAL at 08:21

## 2021-01-01 RX ADMIN — ENOXAPARIN SODIUM 40 MG: 100 INJECTION SUBCUTANEOUS at 10:07

## 2021-01-01 RX ADMIN — NEOMYCIN SULFATE, POLYMYXIN B SULFATE AND DEXAMETHASONE 1 DROP: 3.5; 10000; 1 SUSPENSION/ DROPS OPHTHALMIC at 17:46

## 2021-01-01 RX ADMIN — ENOXAPARIN SODIUM 40 MG: 100 INJECTION SUBCUTANEOUS at 10:06

## 2021-01-01 RX ADMIN — RANOLAZINE 1000 MG: 500 TABLET, FILM COATED, EXTENDED RELEASE ORAL at 21:14

## 2021-01-01 RX ADMIN — FUROSEMIDE 40 MG: 40 TABLET ORAL at 10:41

## 2021-01-01 RX ADMIN — RANOLAZINE 1000 MG: 500 TABLET, FILM COATED, EXTENDED RELEASE ORAL at 20:51

## 2021-01-01 RX ADMIN — ONDANSETRON 4 MG: 2 INJECTION INTRAMUSCULAR; INTRAVENOUS at 12:31

## 2021-01-01 RX ADMIN — Medication 10 ML: at 11:19

## 2021-01-01 RX ADMIN — ISOSORBIDE DINITRATE 20 MG: 20 TABLET ORAL at 11:13

## 2021-01-01 RX ADMIN — NEOMYCIN SULFATE, POLYMYXIN B SULFATE AND DEXAMETHASONE 1 DROP: 3.5; 10000; 1 SUSPENSION/ DROPS OPHTHALMIC at 00:14

## 2021-01-01 RX ADMIN — TICAGRELOR 90 MG: 90 TABLET ORAL at 00:36

## 2021-01-01 RX ADMIN — TICAGRELOR 90 MG: 90 TABLET ORAL at 10:07

## 2021-01-01 RX ADMIN — RANOLAZINE 1000 MG: 500 TABLET, FILM COATED, EXTENDED RELEASE ORAL at 10:06

## 2021-01-01 RX ADMIN — FOLIC ACID 1 MG: 1 TABLET ORAL at 11:14

## 2021-01-01 RX ADMIN — METOPROLOL SUCCINATE 50 MG: 50 TABLET, EXTENDED RELEASE ORAL at 08:14

## 2021-01-01 RX ADMIN — Medication 10 ML: at 22:24

## 2021-01-01 RX ADMIN — NEOMYCIN SULFATE, POLYMYXIN B SULFATE AND DEXAMETHASONE 1 DROP: 3.5; 10000; 1 SUSPENSION/ DROPS OPHTHALMIC at 05:14

## 2021-01-01 RX ADMIN — Medication 10 ML: at 10:40

## 2021-01-01 RX ADMIN — LEVOTHYROXINE SODIUM 50 MCG: 0.05 TABLET ORAL at 06:08

## 2021-01-01 RX ADMIN — METOPROLOL SUCCINATE 50 MG: 50 TABLET, EXTENDED RELEASE ORAL at 22:06

## 2021-01-01 RX ADMIN — RANOLAZINE 1000 MG: 500 TABLET, FILM COATED, EXTENDED RELEASE ORAL at 08:20

## 2021-01-01 RX ADMIN — ISOSORBIDE MONONITRATE 30 MG: 30 TABLET, EXTENDED RELEASE ORAL at 08:28

## 2021-01-01 RX ADMIN — ASPIRIN 81 MG: 81 TABLET, COATED ORAL at 14:57

## 2021-01-01 RX ADMIN — LEVOFLOXACIN 500 MG: 5 INJECTION, SOLUTION INTRAVENOUS at 00:06

## 2021-01-01 RX ADMIN — RANOLAZINE 1000 MG: 500 TABLET, FILM COATED, EXTENDED RELEASE ORAL at 08:27

## 2021-01-01 RX ADMIN — LORAZEPAM 1 MG: 1 TABLET ORAL at 18:15

## 2021-01-01 RX ADMIN — PRAZOSIN HYDROCHLORIDE 2 MG: 2 CAPSULE ORAL at 20:02

## 2021-01-01 RX ADMIN — RANOLAZINE 500 MG: 500 TABLET, FILM COATED, EXTENDED RELEASE ORAL at 08:15

## 2021-01-01 RX ADMIN — METOPROLOL SUCCINATE 50 MG: 50 TABLET, EXTENDED RELEASE ORAL at 10:08

## 2021-01-01 RX ADMIN — INSULIN LISPRO 4 UNITS: 100 INJECTION, SOLUTION INTRAVENOUS; SUBCUTANEOUS at 12:13

## 2021-01-01 RX ADMIN — ATORVASTATIN CALCIUM 80 MG: 80 TABLET, FILM COATED ORAL at 22:34

## 2021-01-01 RX ADMIN — METOPROLOL SUCCINATE 50 MG: 50 TABLET, EXTENDED RELEASE ORAL at 08:22

## 2021-01-01 RX ADMIN — MECLIZINE HYDROCHLORIDE 25 MG: 25 TABLET ORAL at 16:53

## 2021-01-01 RX ADMIN — ENOXAPARIN SODIUM 40 MG: 40 INJECTION SUBCUTANEOUS at 10:41

## 2021-01-01 RX ADMIN — RANOLAZINE 1000 MG: 500 TABLET, FILM COATED, EXTENDED RELEASE ORAL at 11:21

## 2021-01-01 RX ADMIN — Medication 10 ML: at 11:39

## 2021-01-01 RX ADMIN — HYDRALAZINE HYDROCHLORIDE 50 MG: 50 TABLET, FILM COATED ORAL at 06:03

## 2021-01-01 RX ADMIN — FOLIC ACID 1 MG: 1 TABLET ORAL at 14:58

## 2021-01-01 RX ADMIN — TICAGRELOR 90 MG: 90 TABLET ORAL at 21:14

## 2021-01-01 RX ADMIN — RANOLAZINE 1000 MG: 500 TABLET, FILM COATED, EXTENDED RELEASE ORAL at 22:05

## 2021-01-01 RX ADMIN — ATORVASTATIN CALCIUM 80 MG: 80 TABLET, FILM COATED ORAL at 20:34

## 2021-01-01 RX ADMIN — INSULIN LISPRO 2 UNITS: 100 INJECTION, SOLUTION INTRAVENOUS; SUBCUTANEOUS at 16:33

## 2021-01-01 RX ADMIN — Medication 5 ML: at 20:37

## 2021-01-01 RX ADMIN — SODIUM CHLORIDE 1000 ML: 9 INJECTION, SOLUTION INTRAVENOUS at 12:31

## 2021-01-01 RX ADMIN — LEVOTHYROXINE SODIUM 50 MCG: 0.05 TABLET ORAL at 08:17

## 2021-01-01 RX ADMIN — HYDROXYZINE PAMOATE 25 MG: 25 CAPSULE ORAL at 08:33

## 2021-01-01 RX ADMIN — HALOPERIDOL 5 MG: 2 TABLET ORAL at 08:20

## 2021-01-01 RX ADMIN — ISOSORBIDE DINITRATE 20 MG: 20 TABLET ORAL at 14:58

## 2021-01-01 RX ADMIN — GABAPENTIN 600 MG: 300 CAPSULE ORAL at 21:14

## 2021-01-01 RX ADMIN — LEVOFLOXACIN 250 MG: 5 INJECTION, SOLUTION INTRAVENOUS at 00:13

## 2021-01-01 RX ADMIN — HALOPERIDOL 5 MG: 5 TABLET ORAL at 10:40

## 2021-01-01 RX ADMIN — GABAPENTIN 600 MG: 300 CAPSULE ORAL at 11:11

## 2021-01-01 RX ADMIN — HALOPERIDOL 5 MG: 5 TABLET ORAL at 10:58

## 2021-01-01 RX ADMIN — HYDROXYZINE PAMOATE 25 MG: 25 CAPSULE ORAL at 20:02

## 2021-01-01 RX ADMIN — GABAPENTIN 600 MG: 300 CAPSULE ORAL at 22:34

## 2021-01-01 RX ADMIN — NEOMYCIN SULFATE, POLYMYXIN B SULFATE AND DEXAMETHASONE 1 DROP: 3.5; 10000; 1 SUSPENSION/ DROPS OPHTHALMIC at 18:20

## 2021-01-01 RX ADMIN — NEOMYCIN SULFATE, POLYMYXIN B SULFATE AND DEXAMETHASONE 1 DROP: 3.5; 10000; 1 SUSPENSION/ DROPS OPHTHALMIC at 06:08

## 2021-01-01 RX ADMIN — PRAZOSIN HYDROCHLORIDE 2 MG: 2 CAPSULE ORAL at 20:34

## 2021-01-01 RX ADMIN — ATORVASTATIN CALCIUM 80 MG: 80 TABLET, FILM COATED ORAL at 20:50

## 2021-01-01 RX ADMIN — ATORVASTATIN CALCIUM 80 MG: 80 TABLET, FILM COATED ORAL at 21:45

## 2021-01-01 RX ADMIN — OXYBUTYNIN CHLORIDE 5 MG: 5 TABLET, EXTENDED RELEASE ORAL at 21:45

## 2021-01-01 RX ADMIN — ENOXAPARIN SODIUM 40 MG: 40 INJECTION SUBCUTANEOUS at 08:10

## 2021-01-01 RX ADMIN — TICAGRELOR 90 MG: 90 TABLET ORAL at 21:45

## 2021-01-01 RX ADMIN — INSULIN LISPRO 8 UNITS: 100 INJECTION, SOLUTION INTRAVENOUS; SUBCUTANEOUS at 11:53

## 2021-01-01 RX ADMIN — HYDRALAZINE HYDROCHLORIDE 50 MG: 50 TABLET, FILM COATED ORAL at 05:34

## 2021-01-01 RX ADMIN — MECLIZINE HYDROCHLORIDE 25 MG: 25 TABLET ORAL at 10:41

## 2021-01-01 RX ADMIN — ISOSORBIDE DINITRATE 20 MG: 20 TABLET ORAL at 15:42

## 2021-01-01 RX ADMIN — HYDROXYZINE PAMOATE 25 MG: 25 CAPSULE ORAL at 14:49

## 2021-01-01 RX ADMIN — INSULIN LISPRO 2 UNITS: 100 INJECTION, SOLUTION INTRAVENOUS; SUBCUTANEOUS at 17:46

## 2021-01-01 RX ADMIN — ENOXAPARIN SODIUM 40 MG: 40 INJECTION SUBCUTANEOUS at 08:27

## 2021-01-01 RX ADMIN — INSULIN LISPRO 2 UNITS: 100 INJECTION, SOLUTION INTRAVENOUS; SUBCUTANEOUS at 11:25

## 2021-01-01 RX ADMIN — DOCUSATE SODIUM 100 MG: 100 CAPSULE ORAL at 11:14

## 2021-01-01 RX ADMIN — RANOLAZINE 1000 MG: 500 TABLET, FILM COATED, EXTENDED RELEASE ORAL at 14:59

## 2021-01-01 RX ADMIN — TICAGRELOR 90 MG: 90 TABLET ORAL at 20:51

## 2021-01-01 RX ADMIN — MONTELUKAST 10 MG: 10 TABLET, FILM COATED ORAL at 22:35

## 2021-01-01 RX ADMIN — IPRATROPIUM BROMIDE AND ALBUTEROL SULFATE 1 AMPULE: .5; 2.5 SOLUTION RESPIRATORY (INHALATION) at 22:35

## 2021-01-01 RX ADMIN — ASPIRIN 81 MG: 81 TABLET, COATED ORAL at 08:21

## 2021-01-01 RX ADMIN — FUROSEMIDE 40 MG: 40 TABLET ORAL at 14:42

## 2021-01-01 RX ADMIN — MECLIZINE HYDROCHLORIDE 25 MG: 25 TABLET ORAL at 14:21

## 2021-01-01 RX ADMIN — DOCUSATE SODIUM 100 MG: 100 CAPSULE ORAL at 22:34

## 2021-01-01 RX ADMIN — FUROSEMIDE 40 MG: 40 TABLET ORAL at 11:20

## 2021-01-01 RX ADMIN — HYDRALAZINE HYDROCHLORIDE 50 MG: 50 TABLET, FILM COATED ORAL at 14:58

## 2021-01-01 RX ADMIN — ISOSORBIDE DINITRATE 20 MG: 20 TABLET ORAL at 16:54

## 2021-01-01 RX ADMIN — INSULIN LISPRO 2 UNITS: 100 INJECTION, SOLUTION INTRAVENOUS; SUBCUTANEOUS at 10:58

## 2021-01-01 RX ADMIN — RANOLAZINE 1000 MG: 500 TABLET, FILM COATED, EXTENDED RELEASE ORAL at 22:34

## 2021-01-01 RX ADMIN — MONTELUKAST 10 MG: 10 TABLET, FILM COATED ORAL at 21:14

## 2021-01-01 RX ADMIN — HYDRALAZINE HYDROCHLORIDE 50 MG: 50 TABLET, FILM COATED ORAL at 20:34

## 2021-01-01 RX ADMIN — NEOMYCIN SULFATE, POLYMYXIN B SULFATE AND DEXAMETHASONE 1 DROP: 3.5; 10000; 1 SUSPENSION/ DROPS OPHTHALMIC at 12:12

## 2021-01-01 RX ADMIN — GABAPENTIN 600 MG: 300 CAPSULE ORAL at 22:06

## 2021-01-01 RX ADMIN — HYDROXYZINE PAMOATE 25 MG: 25 CAPSULE ORAL at 08:22

## 2021-01-01 RX ADMIN — LEVOTHYROXINE SODIUM 50 MCG: 0.05 TABLET ORAL at 08:22

## 2021-01-01 RX ADMIN — MONTELUKAST 10 MG: 10 TABLET, FILM COATED ORAL at 22:05

## 2021-01-01 RX ADMIN — HYDROXYZINE PAMOATE 25 MG: 25 CAPSULE ORAL at 10:40

## 2021-01-01 RX ADMIN — INSULIN GLARGINE 50 UNITS: 100 INJECTION, SOLUTION SUBCUTANEOUS at 21:17

## 2021-01-01 RX ADMIN — NEOMYCIN SULFATE, POLYMYXIN B SULFATE AND DEXAMETHASONE 1 DROP: 3.5; 10000; 1 SUSPENSION/ DROPS OPHTHALMIC at 05:04

## 2021-01-01 RX ADMIN — SERTRALINE 200 MG: 100 TABLET, FILM COATED ORAL at 11:20

## 2021-01-01 RX ADMIN — INSULIN LISPRO 4 UNITS: 100 INJECTION, SOLUTION INTRAVENOUS; SUBCUTANEOUS at 11:31

## 2021-01-01 RX ADMIN — HYDRALAZINE HYDROCHLORIDE 50 MG: 50 TABLET, FILM COATED ORAL at 15:42

## 2021-01-01 RX ADMIN — INSULIN LISPRO 2 UNITS: 100 INJECTION, SOLUTION INTRAVENOUS; SUBCUTANEOUS at 12:08

## 2021-01-01 RX ADMIN — GABAPENTIN 600 MG: 300 CAPSULE ORAL at 08:27

## 2021-01-01 RX ADMIN — MECLIZINE HYDROCHLORIDE 25 MG: 25 TABLET ORAL at 14:49

## 2021-01-01 RX ADMIN — Medication 10 ML: at 11:14

## 2021-01-01 RX ADMIN — SERTRALINE 200 MG: 100 TABLET, FILM COATED ORAL at 10:06

## 2021-01-01 RX ADMIN — Medication 10 ML: at 08:10

## 2021-01-01 RX ADMIN — HYDROXYZINE PAMOATE 25 MG: 25 CAPSULE ORAL at 20:58

## 2021-01-01 RX ADMIN — ENOXAPARIN SODIUM 40 MG: 40 INJECTION SUBCUTANEOUS at 08:20

## 2021-01-01 RX ADMIN — ISOSORBIDE DINITRATE 20 MG: 20 TABLET ORAL at 21:45

## 2021-01-01 RX ADMIN — HYDROXYZINE PAMOATE 25 MG: 25 CAPSULE ORAL at 14:59

## 2021-01-01 RX ADMIN — HALOPERIDOL 5 MG: 2 TABLET ORAL at 10:09

## 2021-01-01 RX ADMIN — MECLIZINE HYDROCHLORIDE 25 MG: 25 TABLET ORAL at 08:29

## 2021-01-01 RX ADMIN — MECLIZINE HYDROCHLORIDE 25 MG: 25 TABLET ORAL at 08:19

## 2021-01-01 RX ADMIN — ISOSORBIDE DINITRATE 20 MG: 20 TABLET ORAL at 22:34

## 2021-01-01 RX ADMIN — MECLIZINE HYDROCHLORIDE 25 MG: 25 TABLET ORAL at 14:59

## 2021-01-01 ASSESSMENT — ENCOUNTER SYMPTOMS
SHORTNESS OF BREATH: 1
WHEEZING: 0
TROUBLE SWALLOWING: 0
NAUSEA: 0
CHEST TIGHTNESS: 0
TROUBLE SWALLOWING: 0
ABDOMINAL PAIN: 1
COUGH: 1
COLOR CHANGE: 0
ABDOMINAL PAIN: 0
SHORTNESS OF BREATH: 1
NAUSEA: 0
VOMITING: 0
APNEA: 0
SHORTNESS OF BREATH: 1
SINUS PRESSURE: 0
CHEST TIGHTNESS: 0
NAUSEA: 0
SHORTNESS OF BREATH: 0
VOICE CHANGE: 0
VOICE CHANGE: 0
COUGH: 0
CHEST TIGHTNESS: 0
EYE ITCHING: 0
TROUBLE SWALLOWING: 0
VOMITING: 0
SORE THROAT: 0
WHEEZING: 0
SHORTNESS OF BREATH: 1
COUGH: 0
WHEEZING: 0
CONSTIPATION: 0
ABDOMINAL DISTENTION: 0
VOMITING: 0
COUGH: 0
DIARRHEA: 0
CHEST TIGHTNESS: 1
WHEEZING: 0
VOMITING: 0
TROUBLE SWALLOWING: 0
VOMITING: 1
CHEST TIGHTNESS: 0
EYE DISCHARGE: 0
RHINORRHEA: 0
ABDOMINAL PAIN: 0
EYE ITCHING: 0
CONSTIPATION: 0
WHEEZING: 0
DIARRHEA: 0
VOMITING: 0
RHINORRHEA: 0
ABDOMINAL PAIN: 0
NAUSEA: 0
SORE THROAT: 0
COUGH: 0
SORE THROAT: 0
SINUS PRESSURE: 0
DIARRHEA: 0
RHINORRHEA: 0
TROUBLE SWALLOWING: 0
SHORTNESS OF BREATH: 1
BACK PAIN: 0
EYE PAIN: 0
EYES NEGATIVE: 1
CHEST TIGHTNESS: 0
SORE THROAT: 0
DIARRHEA: 1
NAUSEA: 0
CHEST TIGHTNESS: 0
SINUS PRESSURE: 0
COLOR CHANGE: 0
COUGH: 0
ABDOMINAL PAIN: 0
TROUBLE SWALLOWING: 0
DIARRHEA: 0
COUGH: 1
NAUSEA: 0
VOICE CHANGE: 0
ALLERGIC/IMMUNOLOGIC NEGATIVE: 1
EYES NEGATIVE: 1
SORE THROAT: 0
EYE DISCHARGE: 0
DIARRHEA: 0
SHORTNESS OF BREATH: 1
EYE ITCHING: 0
ABDOMINAL PAIN: 0
ABDOMINAL PAIN: 0
EYE DISCHARGE: 0
DIARRHEA: 0
TROUBLE SWALLOWING: 0
ABDOMINAL PAIN: 0
SHORTNESS OF BREATH: 0
SHORTNESS OF BREATH: 1
WHEEZING: 0
VOMITING: 0
NAUSEA: 0
SORE THROAT: 0
COUGH: 0
EYES NEGATIVE: 1

## 2021-01-01 ASSESSMENT — PAIN SCALES - GENERAL
PAINLEVEL_OUTOF10: 8
PAINLEVEL_OUTOF10: 0
PAINLEVEL_OUTOF10: 3
PAINLEVEL_OUTOF10: 8
PAINLEVEL_OUTOF10: 0
PAINLEVEL_OUTOF10: 10
PAINLEVEL_OUTOF10: 0
PAINLEVEL_OUTOF10: 10
PAINLEVEL_OUTOF10: 0
PAINLEVEL_OUTOF10: 0
PAINLEVEL_OUTOF10: 6
PAINLEVEL_OUTOF10: 0
PAINLEVEL_OUTOF10: 8
PAINLEVEL_OUTOF10: 0
PAINLEVEL_OUTOF10: 0
PAINLEVEL_OUTOF10: 8
PAINLEVEL_OUTOF10: 6
PAINLEVEL_OUTOF10: 0
PAINLEVEL_OUTOF10: 0

## 2021-01-01 ASSESSMENT — PAIN DESCRIPTION - ORIENTATION
ORIENTATION: MID
ORIENTATION: LEFT;ANTERIOR

## 2021-01-01 ASSESSMENT — PAIN SCALES - WONG BAKER
WONGBAKER_NUMERICALRESPONSE: 0
WONGBAKER_NUMERICALRESPONSE: 2

## 2021-01-01 ASSESSMENT — PAIN DESCRIPTION - PAIN TYPE
TYPE: ACUTE PAIN;CHRONIC PAIN
TYPE: ACUTE PAIN

## 2021-01-01 ASSESSMENT — PAIN DESCRIPTION - LOCATION
LOCATION: CHEST
LOCATION: ABDOMEN
LOCATION: CHEST
LOCATION: CHEST

## 2021-01-01 ASSESSMENT — PAIN DESCRIPTION - FREQUENCY
FREQUENCY: INTERMITTENT
FREQUENCY: INTERMITTENT

## 2021-01-01 ASSESSMENT — PAIN DESCRIPTION - PROGRESSION
CLINICAL_PROGRESSION: GRADUALLY WORSENING
CLINICAL_PROGRESSION: GRADUALLY IMPROVING

## 2021-01-01 ASSESSMENT — PAIN DESCRIPTION - DESCRIPTORS
DESCRIPTORS: BURNING;SQUEEZING
DESCRIPTORS: CONSTANT;ACHING

## 2021-01-01 ASSESSMENT — PAIN - FUNCTIONAL ASSESSMENT: PAIN_FUNCTIONAL_ASSESSMENT: PREVENTS OR INTERFERES SOME ACTIVE ACTIVITIES AND ADLS

## 2021-01-01 ASSESSMENT — PAIN DESCRIPTION - ONSET: ONSET: ON-GOING

## 2021-01-11 LAB
HCT VFR BLD CALC: 37.7 % (ref 37–47)
HEMOGLOBIN: 12.4 G/DL (ref 12–16)
MCH RBC QN AUTO: 24.6 PG (ref 27–31.3)
MCHC RBC AUTO-ENTMCNC: 33 % (ref 33–37)
MCV RBC AUTO: 74.7 FL (ref 82–100)
PDW BLD-RTO: 20.1 % (ref 11.5–14.5)
PLATELET # BLD: 218 K/UL (ref 130–400)
RBC # BLD: 5.05 M/UL (ref 4.2–5.4)
WBC # BLD: 7.7 K/UL (ref 4.8–10.8)

## 2021-02-05 DIAGNOSIS — J45.20 MILD INTERMITTENT ASTHMA WITHOUT COMPLICATION: ICD-10-CM

## 2021-02-07 RX ORDER — MONTELUKAST SODIUM 10 MG/1
10 TABLET ORAL NIGHTLY
Qty: 30 TABLET | Refills: 2 | Status: SHIPPED | OUTPATIENT
Start: 2021-02-07 | End: 2021-03-25 | Stop reason: SDUPTHER

## 2021-03-02 PROBLEM — I65.29 STENOSIS OF CAROTID ARTERY: Status: ACTIVE | Noted: 2021-03-02

## 2021-03-03 RX ORDER — LEVOTHYROXINE SODIUM 0.05 MG/1
TABLET ORAL
Qty: 30 TABLET | Refills: 5 | Status: SHIPPED | OUTPATIENT
Start: 2021-03-03 | End: 2021-03-24 | Stop reason: SDUPTHER

## 2021-03-09 ENCOUNTER — OFFICE VISIT (OUTPATIENT)
Dept: NEUROLOGY | Age: 66
End: 2021-03-09
Payer: COMMERCIAL

## 2021-03-09 VITALS
WEIGHT: 246.3 LBS | HEART RATE: 63 BPM | SYSTOLIC BLOOD PRESSURE: 130 MMHG | BODY MASS INDEX: 42.28 KG/M2 | DIASTOLIC BLOOD PRESSURE: 74 MMHG

## 2021-03-09 DIAGNOSIS — E03.9 HYPOTHYROIDISM, UNSPECIFIED TYPE: ICD-10-CM

## 2021-03-09 DIAGNOSIS — Z79.4 TYPE 2 DIABETES MELLITUS WITH DIABETIC NEUROPATHY, WITH LONG-TERM CURRENT USE OF INSULIN (HCC): ICD-10-CM

## 2021-03-09 DIAGNOSIS — Z79.4 TYPE 2 DIABETES MELLITUS WITH HYPERGLYCEMIA, WITH LONG-TERM CURRENT USE OF INSULIN (HCC): ICD-10-CM

## 2021-03-09 DIAGNOSIS — I63.9 CEREBROVASCULAR ACCIDENT (CVA), UNSPECIFIED MECHANISM (HCC): Primary | ICD-10-CM

## 2021-03-09 DIAGNOSIS — E78.5 HYPERLIPIDEMIA, UNSPECIFIED HYPERLIPIDEMIA TYPE: ICD-10-CM

## 2021-03-09 DIAGNOSIS — I65.23 BILATERAL CAROTID ARTERY STENOSIS: ICD-10-CM

## 2021-03-09 DIAGNOSIS — E11.40 TYPE 2 DIABETES MELLITUS WITH DIABETIC NEUROPATHY, WITH LONG-TERM CURRENT USE OF INSULIN (HCC): ICD-10-CM

## 2021-03-09 DIAGNOSIS — E11.65 TYPE 2 DIABETES MELLITUS WITH HYPERGLYCEMIA, WITH LONG-TERM CURRENT USE OF INSULIN (HCC): ICD-10-CM

## 2021-03-09 LAB
ANION GAP SERPL CALCULATED.3IONS-SCNC: 13 MEQ/L (ref 9–15)
BUN BLDV-MCNC: 42 MG/DL (ref 8–23)
CALCIUM SERPL-MCNC: 9 MG/DL (ref 8.5–9.9)
CHLORIDE BLD-SCNC: 101 MEQ/L (ref 95–107)
CHOLESTEROL, TOTAL: 127 MG/DL (ref 0–199)
CO2: 23 MEQ/L (ref 20–31)
CREAT SERPL-MCNC: 1.3 MG/DL (ref 0.5–0.9)
GFR AFRICAN AMERICAN: 50
GFR NON-AFRICAN AMERICAN: 41.3
GLUCOSE BLD-MCNC: 148 MG/DL (ref 70–99)
HBA1C MFR BLD: 9 % (ref 4.8–5.9)
HDLC SERPL-MCNC: 45 MG/DL (ref 40–59)
LDL CHOLESTEROL CALCULATED: 66 MG/DL (ref 0–129)
POTASSIUM SERPL-SCNC: 4.2 MEQ/L (ref 3.4–4.9)
SODIUM BLD-SCNC: 137 MEQ/L (ref 135–144)
T4 FREE: 1.57 NG/DL (ref 0.84–1.68)
TRIGL SERPL-MCNC: 79 MG/DL (ref 0–150)
TSH REFLEX: 1.68 UIU/ML (ref 0.44–3.86)

## 2021-03-09 PROCEDURE — G8484 FLU IMMUNIZE NO ADMIN: HCPCS | Performed by: NURSE PRACTITIONER

## 2021-03-09 PROCEDURE — G8417 CALC BMI ABV UP PARAM F/U: HCPCS | Performed by: NURSE PRACTITIONER

## 2021-03-09 PROCEDURE — 1036F TOBACCO NON-USER: CPT | Performed by: NURSE PRACTITIONER

## 2021-03-09 PROCEDURE — G8427 DOCREV CUR MEDS BY ELIG CLIN: HCPCS | Performed by: NURSE PRACTITIONER

## 2021-03-09 PROCEDURE — 99214 OFFICE O/P EST MOD 30 MIN: CPT | Performed by: NURSE PRACTITIONER

## 2021-03-09 PROCEDURE — 2022F DILAT RTA XM EVC RTNOPTHY: CPT | Performed by: NURSE PRACTITIONER

## 2021-03-09 PROCEDURE — 3052F HG A1C>EQUAL 8.0%<EQUAL 9.0%: CPT | Performed by: NURSE PRACTITIONER

## 2021-03-09 PROCEDURE — 3017F COLORECTAL CA SCREEN DOC REV: CPT | Performed by: NURSE PRACTITIONER

## 2021-03-09 RX ORDER — OXYBUTYNIN CHLORIDE 5 MG/1
TABLET, EXTENDED RELEASE ORAL
COMMUNITY
Start: 2021-02-22 | End: 2021-03-19

## 2021-03-09 RX ORDER — ATORVASTATIN CALCIUM 40 MG/1
TABLET, FILM COATED ORAL
COMMUNITY
Start: 2021-02-08 | End: 2021-03-09

## 2021-03-09 RX ORDER — POLYVINYL ALCOHOL 14 MG/ML
1 SOLUTION/ DROPS OPHTHALMIC 2 TIMES DAILY
COMMUNITY
Start: 2021-03-08

## 2021-03-09 RX ORDER — CYCLOSPORINE 0.5 MG/ML
1 EMULSION OPHTHALMIC NIGHTLY
COMMUNITY
Start: 2021-03-08

## 2021-03-09 RX ORDER — IBUPROFEN 800 MG/1
TABLET ORAL
Status: ON HOLD | COMMUNITY
Start: 2021-02-23 | End: 2021-04-07 | Stop reason: HOSPADM

## 2021-03-09 RX ORDER — NEOMYCIN SULFATE, POLYMYXIN B SULFATE AND DEXAMETHASONE 3.5; 10000; 1 MG/ML; [USP'U]/ML; MG/ML
1 SUSPENSION/ DROPS OPHTHALMIC 4 TIMES DAILY
COMMUNITY
Start: 2021-01-05

## 2021-03-09 ASSESSMENT — ENCOUNTER SYMPTOMS
CHEST TIGHTNESS: 0
CONSTIPATION: 0
SHORTNESS OF BREATH: 0
ABDOMINAL PAIN: 0
WHEEZING: 0
NAUSEA: 0
COUGH: 0
VOMITING: 0
COLOR CHANGE: 0
TROUBLE SWALLOWING: 0

## 2021-03-09 NOTE — PROGRESS NOTES
Subjective:      Patient ID: Fabienne Vale is a 63 y.o. female who presents today for:  Chief Complaint   Patient presents with    6 Month Follow-Up     Pt states that she has been good. She says no conerns at this time, No effects from the CVA. HPI  Pt seen and examined in the office for 6-month follow-up    3/9/2021:  Patient seen and examined for 6-month follow-up for vertigo with posterior right frontal lobe possible CVA. She is currently alert and oriented x3, no acute distress, cooperative. She is  speaking and  was used. Patient continues on dual antiplatelet therapy with Brilinta and aspirin. There is been no recurrent strokelike symptoms or new focal neuro deficits. Dizziness is resolved. No falls reported. She is ambulating with Rollator. She reports daily compliance with dual antiplatelet therapy and denies signs and symptoms of unusual bleeding or bruising. She continues on high intensity statin. She is now compliant with using CPAP for obstructive sleep apnea. She is followed by pulmonology. She voices no new or acute complaints or concerns today and states that overall she is doing well. 9/8/2020:  Pt seen and examined in the office for 3-month follow-up visit for vertigo with posterior right frontal lobe possible CVA. Although upon review of MRI Dr. Aureliano Dang thought that this could possibly be a vessel and not an acute CVA. Maria G Velarde Patient was last seen on 6/9/2020. She is currently alert and oriented x3, no acute distress, cooperative. She remains on dual antiplatelet therapy with Brilinta and aspirin. No unusual signs or symptoms of bleeding or bruising. Dizziness resolved. No focal deficits. Patient does have known carotid stenosis 50 to 69% in the left internal carotid artery. Patient's only complaint today is of insomnia. She reports that she is unable to sleep at times due to anxiety. Patient is already tried melatonin with no good effect.   She has multiple allergies to typical medications that we used to treat insomnia such as Ambien, mirtazapine, trazodone. She is also has multiple allergies to antidepressants and SSRIs which we would use to treat her anxiety. She is being followed by pulmonology and had a sleep study on 1019 that showed severe obstructive sleep apnea with need for CPAP. Patient reports she is not using CPAP.    6/9/2020:  Pt seen and examined in the office for hospital follow up. Pt was admitted to [facility] from 5/6/2020 to 5/11/2020 for acute onset vertigo with nausea and vomiting. Hospital records reviewed. CT the head was done that was negative for acute intracranial process MRI of the brain was done and. Report identified a small focus of restricted diffusion involving the posterior right frontal lobe consistent with possible CVA. Dr. Elaine Deutsch reviewed MRI and thought that possibly this could be a vessel and not an acute CVA. Patient was on dual antiplatelet therapy at the time of presentation with aspirin and Brilinta. She has significant cardiac history. Patient was noted to be hyponatremic at the time of presentation with sodium level of 128. This normalized prior to discharge. Dizziness was thought to be multifactorial related to underlying multiple medications and cardiorespiratory dysfunction. Dizziness did improve prior to discharge. Patient was continued on the same dual antiplatelet therapy. Carotid duplex was reviewed from 4/21/2020 with noted 50 to 69% stenosis of the left internal carotid artery. Patient's hemoglobin A1c at that time was 7.7. She has since had it rechecked on 5/29/2020 and it has increased to 8.2. She reports she has an appointment with endocrinology next week. Patient was discharged to home. She is current with home health care. Patient has been initiated on oxygen therapy at night and is followed by pulmonology. To possibly have outpatient sleep study.   Patient currently per session: Not on file    Stress: Not on file   Relationships    Social connections     Talks on phone: Not on file     Gets together: Not on file     Attends [employer] service: Not on file     Active member of club or organization: Not on file     Attends meetings of clubs or organizations: Not on file     Relationship status: Not on file    Intimate partner violence     Fear of current or ex partner: Not on file     Emotionally abused: Not on file     Physically abused: Not on file     Forced sexual activity: Not on file   Other Topics Concern    Not on file   Social History Narrative         Lives With: Spouse    Type of Home: [address] [place] [address] [address]: One level    Home Access: Elevator    Bathroom Shower/Tub: Tub/Shower unit(Simultaneous filing. User may not have seen previous data.)    Bathroom Equipment: Grab bars in shower, Shower chair    Home Equipment: Rolling walker, Jorge Cone bed    ADL Assistance: Needs assistance    Homemaking Assistance: Needs assistance    Homemaking Responsibilities: No    Ambulation Assistance: Independent    Transfer Assistance: Independent    Active : No    Additional Comments: Pt wears orthopedic shoes at baseline    The patient states she is current with [facility](RN per the ). The patient had a walker, but obtained a hospital bed, wheel chair, BSC and O2 last admission (from [address]). pharmacy is [address] [place] [address],[address] [address]., [facility]. Transportation is provided by [employer]	[facility] () per the patient     No family history on file.   Allergies   Allergen Reactions    Ambien [Zolpidem Tartrate]     Capoten [Captopril]     Clioquinol     Cogentin [Benztropine]     Depakote [Divalproex Sodium]     Effexor Xr [Venlafaxine Hcl Er]     Geodon [Ziprasidone Hcl]     Lisinopril      Hyperkalemia: 4/21/20 potassium was 6.7    Lyrica [Pregabalin]     Navane [Thiothixene]     Pamelor [Nortriptyline Hcl]     Remeron [Mirtazapine]     Risperdal [Risperidone]     Trazodone And Nefazodone     Wellbutrin [Bupropion]      Current Outpatient Medications on File Prior to Visit   Medication Sig Dispense Refill    RESTASIS 0.05 % ophthalmic emulsion       ibuprofen (ADVIL;MOTRIN) 800 MG tablet take 1 tablet by mouth every 8 hours AS NEEDED FOR PAIN with food or milk      neomycin-polymyxin-dexameth (MAXITROL) 3.5-87257-8.1 ophthalmic suspension instill 1 drop into both eyes four times a day      oxybutynin (DITROPAN-XL) 5 MG extended release tablet take 1 tablet by mouth once daily      ARTIFICIAL TEARS 1.4 % ophthalmic solution       levothyroxine (SYNTHROID) 50 MCG tablet take 1 tablet by mouth once daily 30 tablet 5    montelukast (SINGULAIR) 10 MG tablet Take 1 tablet by mouth nightly 30 tablet 2    Continuous Blood Gluc Sensor (FREESTYLE FELY 14 DAY SENSOR) MISC Every 2 weeks 2 each 06    albuterol sulfate HFA (VENTOLIN HFA) 108 (90 Base) MCG/ACT inhaler Inhale 2 puffs into the lungs as needed for Wheezing Every 4-6 hours PRN 1 Inhaler 5    albuterol (PROVENTIL) (2.5 MG/3ML) 0.083% nebulizer solution Take 3 mLs by nebulization every 6 hours as needed for Wheezing 120 each 5    docusate sodium (COLACE, DULCOLAX) 100 MG CAPS Take 100 mg by mouth 2 times daily 60 capsule 1    prazosin (MINIPRESS) 2 MG capsule Take 2 mg by mouth nightly       sertraline (ZOLOFT) 100 MG tablet Take 200 mg by mouth daily       insulin lispro, 1 Unit Dial, (HUMALOG KWIKPEN) 100 UNIT/ML SOPN 10 units at each meals 10 pen 03    Alcohol Swabs (ALCOHOL PREP) 70 % PADS qid 300 each 06    Continuous Blood Gluc Sensor (FREESTYLE FELY 14 DAY SENSOR) MISC Every 2 weeks 2 each 06    Continuous Blood Gluc  (FREESTYLE FELY 14 DAY READER) CATHLEEN As directed 1 Device 00    Insulin Pen Needle (NOVOFINE) 32G X 6 MM MISC qid 300 each 3    furosemide (LASIX) 40 MG tablet Take 1 tablet by mouth daily 60 tablet 3    ticagrelor (BRILINTA) 90 MG TABS tablet Take 90 mg by mouth 2 times daily      CPAP Machine MISC by Does not apply route New CPAP with 10 cm 1 each 0    aspirin 81 MG EC tablet Take 1 tablet by mouth daily 30 tablet 3    atorvastatin (LIPITOR) 80 MG tablet Take 1 tablet by mouth nightly 30 tablet 3    OXYGEN 2 lit O2 with sleep , please give O2 concentrator 1 Units 0    Emollient (EUCERIN INTENSIVE REPAIR HAND) 2.5-10 % CREA APPLY TO FEET AT BEDTIME; PLACE SOCKS OVER FEET AFTER APPLICATION IF NEEDED FOR DRY CRACKING FEET      hydrOXYzine (VISTARIL) 25 MG capsule Take 50 mg by mouth 3 times daily as needed       Nutritional Supplements (GLUCERNA SHAKE) LIQD take as directed three times a day      insulin glargine (LANTUS) 100 UNIT/ML injection vial Inject 30 Units into the skin nightly (Patient taking differently: Inject 35 Units into the skin nightly ) 1 vial 3    isosorbide dinitrate (ISORDIL) 20 MG tablet Take 1 tablet by mouth 3 times daily 90 tablet 3    nitroGLYCERIN (NITROSTAT) 0.4 MG SL tablet up to max of 3 total doses. If no relief after 1 dose, call 911. 25 tablet 3    hydrALAZINE (APRESOLINE) 50 MG tablet Take 1 tablet by mouth every 8 hours 90 tablet 3    metoprolol succinate (TOPROL XL) 50 MG extended release tablet Take 1 tablet by mouth 2 times daily 30 tablet 3    amitriptyline (ELAVIL) 25 MG tablet Take 25 mg by mouth nightly      gabapentin (NEURONTIN) 400 MG capsule Take 400 mg by mouth 2 times daily. AM and 800 mg in pm-9pm      haloperidol (HALDOL) 5 MG tablet Take 5 mg by mouth daily      FreeStyle Lancets MISC 1 each by Does not apply route daily 100 each 3    blood glucose test strips (FREESTYLE LITE) strip 1 each by In Vitro route daily As needed.  427 each 3    folic acid (FOLVITE) 1 MG tablet Take 1 mg by mouth daily      Iron Polysacch [id number] (NIFEREX-150 FORTE PO) Take 1 tablet by mouth daily       Cyanocobalamin (VITAMIN B-12) 1000 MCG extended release tablet Take 1,000 mcg by mouth daily      meclizine (ANTIVERT) 25 MG tablet Take 25 mg by mouth three times daily       No current facility-administered medications on file prior to visit. Review of Systems   Constitutional: Negative for appetite change, chills, fatigue and fever. HENT: Negative for hearing loss and trouble swallowing. Eyes: Negative for visual disturbance. Respiratory: Negative for cough, chest tightness, shortness of breath and wheezing. Cardiovascular: Negative for chest pain, palpitations and leg swelling. Gastrointestinal: Negative for abdominal pain, constipation, nausea and vomiting. Musculoskeletal: Positive for gait problem. Skin: Negative for color change and rash. Neurological: Negative for dizziness, tremors, seizures, syncope, facial asymmetry, speech difficulty, weakness, light-headedness, numbness and headaches. Psychiatric/Behavioral: Negative for agitation, confusion and hallucinations. The patient is not nervous/anxious. Objective:   /74 (Site: Left Upper Arm, Position: Sitting, Cuff Size: Medium Adult)   Pulse 63   Wt 246 lb 4.8 oz (111.7 kg)   BMI 42.28 kg/m²     Physical Exam  Vitals signs reviewed. Constitutional:       General: She is not in acute distress. Appearance: She is obese. She is not ill-appearing or diaphoretic. HENT:      Head: Normocephalic and atraumatic. Eyes:      Extraocular Movements: Extraocular movements intact. Pupils: Pupils are equal, round, and reactive to light. Cardiovascular:      Rate and Rhythm: Normal rate and regular rhythm. Pulmonary:      Effort: Pulmonary effort is normal. No respiratory distress. Breath sounds: Normal breath sounds. Abdominal:      General: Bowel sounds are normal.      Palpations: Abdomen is soft. Skin:     General: Skin is warm and dry. Neurological:      General: No focal deficit present.       Mental Status: She is alert and oriented to person, place, and time. Cranial Nerves: No cranial nerve deficit. Motor: No weakness, tremor or seizure activity. Gait: Gait abnormal.         Xr Chest Portable    Result Date: 9/29/2020  Exam: XR CHEST PORTABLE History:  Chest pain Technique: AP portable view of the chest obtained. Comparison: Chest x-ray from June 29, 2020   Findings: There is no change in the median sternotomy. The cardiac silhouette is at the upper limits of normal in size. There are no infiltrates, consolidations or effusions. Bones of the thorax appear intact. No radiographic evidence of acute intrathoracic process.        Lab Results   Component Value Date    WBC 7.7 01/11/2021    RBC 5.05 01/11/2021    HGB 12.4 01/11/2021    HCT 37.7 01/11/2021    MCV 74.7 01/11/2021    MCH 24.6 01/11/2021    MCHC 33.0 01/11/2021    RDW 20.1 01/11/2021     01/11/2021     Lab Results   Component Value Date     01/11/2021    K 4.5 01/11/2021    K 4.6 05/07/2020    CL 98 01/11/2021    CO2 27 01/11/2021    BUN 23 01/11/2021    CREATININE 1.10 01/11/2021    GFRAA >60.0 01/11/2021    LABGLOM 50.1 01/11/2021    GLUCOSE 167 01/11/2021    GLUCOSE 279 01/06/2020    PROT 7.4 01/11/2021    LABALBU 4.1 01/11/2021    LABALBU 3.60 01/11/2021    LABALBU <7.0 01/06/2020    CALCIUM 9.6 01/11/2021    BILITOT 0.5 09/30/2020    ALKPHOS 117 09/30/2020    AST 28 09/30/2020    ALT 27 09/30/2020     Lab Results   Component Value Date    PROTIME 14.1 09/29/2020    INR 1.1 09/29/2020     Lab Results   Component Value Date    TSH 1.420 12/01/2020    FERRITIN 80.1 09/02/2020    IRON 126 02/12/2020    TIBC 295 02/12/2020     Lab Results   Component Value Date    TRIG 69 09/30/2020    HDL 40 09/30/2020    [address] 09/30/2020     Lab Results   Component Value Date    LABAMPH Neg 04/21/2020    BARBSCNU Neg 04/21/2020    LABBENZ POSITIVE 04/21/2020    LABMETH Neg 04/21/2020    OPIATESCREENURINE Neg 04/21/2020    PHENCYCLIDINESCREENURINE

## 2021-03-10 ENCOUNTER — TELEPHONE (OUTPATIENT)
Dept: NEUROLOGY | Age: 66
End: 2021-03-10

## 2021-03-10 RX ORDER — ATORVASTATIN CALCIUM 40 MG/1
40 TABLET, FILM COATED ORAL NIGHTLY
Qty: 30 TABLET | Refills: 3 | Status: ON HOLD | OUTPATIENT
Start: 2021-03-10 | End: 2021-04-07 | Stop reason: SDUPTHER

## 2021-03-11 ENCOUNTER — HOSPITAL ENCOUNTER (OUTPATIENT)
Dept: GENERAL RADIOLOGY | Age: 66
Discharge: HOME | End: 2021-03-13
Payer: COMMERCIAL

## 2021-03-11 DIAGNOSIS — M25.532 PAIN OF BOTH WRIST JOINTS: ICD-10-CM

## 2021-03-11 DIAGNOSIS — M25.531 PAIN OF BOTH WRIST JOINTS: ICD-10-CM

## 2021-03-11 PROCEDURE — 73110 X-RAY EXAM OF WRIST: CPT

## 2021-03-19 RX ORDER — OXYBUTYNIN CHLORIDE 5 MG/1
TABLET, EXTENDED RELEASE ORAL
Qty: 90 TABLET | Refills: 3 | Status: SHIPPED | OUTPATIENT
Start: 2021-03-19 | End: 2022-01-01

## 2021-03-23 ENCOUNTER — OFFICE VISIT (OUTPATIENT)
Dept: ENDOCRINOLOGY | Age: 66
End: 2021-03-23
Payer: COMMERCIAL

## 2021-03-23 VITALS
SYSTOLIC BLOOD PRESSURE: 117 MMHG | DIASTOLIC BLOOD PRESSURE: 75 MMHG | BODY MASS INDEX: 42.17 KG/M2 | HEART RATE: 69 BPM | WEIGHT: 247 LBS | HEIGHT: 64 IN

## 2021-03-23 DIAGNOSIS — E03.9 HYPOTHYROIDISM, UNSPECIFIED TYPE: ICD-10-CM

## 2021-03-23 DIAGNOSIS — Z79.4 TYPE 2 DIABETES MELLITUS WITH HYPERGLYCEMIA, WITH LONG-TERM CURRENT USE OF INSULIN (HCC): Primary | ICD-10-CM

## 2021-03-23 DIAGNOSIS — E11.65 TYPE 2 DIABETES MELLITUS WITH HYPERGLYCEMIA, WITH LONG-TERM CURRENT USE OF INSULIN (HCC): Primary | ICD-10-CM

## 2021-03-23 LAB
CHP ED QC CHECK: NORMAL
GLUCOSE BLD-MCNC: 347 MG/DL

## 2021-03-23 PROCEDURE — G8484 FLU IMMUNIZE NO ADMIN: HCPCS | Performed by: INTERNAL MEDICINE

## 2021-03-23 PROCEDURE — G8417 CALC BMI ABV UP PARAM F/U: HCPCS | Performed by: INTERNAL MEDICINE

## 2021-03-23 PROCEDURE — G8427 DOCREV CUR MEDS BY ELIG CLIN: HCPCS | Performed by: INTERNAL MEDICINE

## 2021-03-23 PROCEDURE — 2022F DILAT RTA XM EVC RTNOPTHY: CPT | Performed by: INTERNAL MEDICINE

## 2021-03-23 PROCEDURE — 99213 OFFICE O/P EST LOW 20 MIN: CPT | Performed by: INTERNAL MEDICINE

## 2021-03-23 PROCEDURE — 3017F COLORECTAL CA SCREEN DOC REV: CPT | Performed by: INTERNAL MEDICINE

## 2021-03-23 PROCEDURE — 3052F HG A1C>EQUAL 8.0%<EQUAL 9.0%: CPT | Performed by: INTERNAL MEDICINE

## 2021-03-23 PROCEDURE — 82962 GLUCOSE BLOOD TEST: CPT | Performed by: INTERNAL MEDICINE

## 2021-03-23 PROCEDURE — 1036F TOBACCO NON-USER: CPT | Performed by: INTERNAL MEDICINE

## 2021-03-23 RX ORDER — DULAGLUTIDE 0.75 MG/.5ML
0.75 INJECTION, SOLUTION SUBCUTANEOUS WEEKLY
Qty: 4 PEN | Refills: 3 | Status: SHIPPED | OUTPATIENT
Start: 2021-03-23 | End: 2021-01-01 | Stop reason: SDUPTHER

## 2021-03-23 RX ORDER — INSULIN LISPRO 100 [IU]/ML
INJECTION, SOLUTION INTRAVENOUS; SUBCUTANEOUS
Qty: 10 PEN | Refills: 3 | Status: SHIPPED | OUTPATIENT
Start: 2021-03-23 | End: 2021-01-01 | Stop reason: SDUPTHER

## 2021-03-23 RX ORDER — INSULIN GLARGINE 100 [IU]/ML
INJECTION, SOLUTION SUBCUTANEOUS
Qty: 15 PEN | Refills: 3 | Status: SHIPPED | OUTPATIENT
Start: 2021-03-23 | End: 2021-01-01 | Stop reason: SDUPTHER

## 2021-03-23 NOTE — PROGRESS NOTES
Subjective:      Patient ID: Arlyn Malone is a 63 y.o. female. Follow-up on type 2 diabetes hypothyroidism lab review exam done with the help of  today patient is on Lantus 40 units at bedtime Humalog 10 units with each meals using freestyle malu testing 5 times daily no hypoglycemia reviewed profile on her reader  Hypothyroidism levothyroxine 50 mcg daily thyroid function test stable  Obesity BMI 42 patient also requesting either Rybelsus or Trulicity  Diabetes  She presents for her follow-up diabetic visit. She has type 2 diabetes mellitus. Symptoms are improving. Diabetic complications include heart disease and PVD. Risk factors for coronary artery disease include obesity. Current diabetic treatment includes insulin injections. She is currently taking insulin pre-breakfast, pre-lunch, pre-dinner and at bedtime. Her overall blood glucose range is 180-200 mg/dl. (Lab Results       Component                Value               Date                       LABA1C                   9.0 (H)             03/09/2021              Reviewed freestyle malu reader average blood sugar between 1  range and no hypoglycemia higher sugars overnight and postprandial)        Results for Burna Babinski (MRN [id number]) as of 3/23/2021 13:30   Ref.  Range 3/9/2021 09:36 3/9/2021 11:20 3/11/2021 13:29 3/23/2021 13:25   Sodium Latest Ref Range: 135 - 144 mEq/L 137      Potassium Latest Ref Range: 3.4 - 4.9 mEq/L 4.2      Chloride Latest Ref Range: 95 - 107 mEq/L 101      CO2 Latest Ref Range: 20 - 31 mEq/L 23      BUN Latest Ref Range: 8 - 23 mg/dL 42 (H)      Creatinine Latest Ref Range: 0.50 - 0.90 mg/dL 1.30 (H)      Anion Gap Latest Ref Range: 9 - 15 mEq/L 13      GFR Non- Latest Ref Range: >60  41.3 (L)      GFR  Latest Ref Range: >60  50.0 (L)      Glucose Latest Units: mg/dL 148 (H)   347   Calcium Latest Ref Range: 8.5 - 9.9 mg/dL 9.0      Cholesterol, Total Latest Ref Range: 0 - 199 mg/dL  127     HDL Cholesterol Latest Ref Range: 40 - 59 mg/dL  45     LDL Calculated Latest Ref Range: 0 - 129 mg/dL  66     Triglycerides Latest Ref Range: 0 - 150 mg/dL  79     Hemoglobin A1C Latest Ref Range: 4.8 - 5.9 % 9.0 (H)      TSH Latest Ref Range: 0.440 - 3.860 uIU/mL 1.680      T4 Free Latest Ref Range: 0.84 - 1.68 ng/dL 1.57          Patient Active Problem List   Diagnosis    Severe major depression with psychotic features ([facility])    Type 2 diabetes mellitus with hyperglycemia, with long-term current use of insulin (HCC)    HTN (hypertension)    HLD (hyperlipidemia)    Hypothyroid    Congestive heart failure ([facility])    Non-pressure ulcer of toe ([facility])    Atherosclerotic PVD with intermittent claudication ([facility])    Acute osteomyelitis ([address]    Infection of skin    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    Surgical wound dehiscence, initial encounter    S/P amputation of lesser toe, right ([facility])    Rectal bleeding    Athscl native arteries of left leg w ulceration oth prt foot ([address]    JESICA (obstructive sleep apnea)    Secondary diabetes mellitus ([facility])    Chest pain    Peripheral vascular disease ([facility])    Cellulitis    Syncope and collapse    Severe mitral regurgitation    Ischemic myocardial dysfunction    Pulmonary hypertension ([facility])    Acute on chronic combined systolic and diastolic congestive heart failure ([facility])    Nocturnal hypoxia    RADHA (acute kidney injury) ([facility])    Dizziness    Acute cerebrovascular accident ([address]    Abnormal gait    Anxiety    Gastritis, unspecified, without bleeding    Pure hypercholesterolemia    Schizophrenia, unspecified ([address]    CAD in native artery    Extrapyramidal disease    Gangrene of toe of left foot (HCC)    Drug-induced hypotension    Osteomyelitis of right foot ([facility])    Nausea and vomiting    Mild intermittent asthma without complication    Heart failure, diastolic, with acute decompensation ([address]    Major depressive disorder, single episode, unspecified    Type 2 diabetes mellitus with diabetic neuropathy, unspecified [facility]    Obesity (BMI 30-39. 9)    Stenosis of carotid artery     Allergies   Allergen Reactions    Ambien [Zolpidem Tartrate]     Capoten [Captopril]     Clioquinol     Cogentin [Benztropine]     Depakote [Divalproex Sodium]     Effexor Xr [Venlafaxine Hcl Er]     Geodon [Ziprasidone Hcl]     Lisinopril      Hyperkalemia: 4/21/20 potassium was 6.7    Lyrica [Pregabalin]     Navane [Thiothixene]     Pamelor [Nortriptyline Hcl]     Remeron [Mirtazapine]     Risperdal [Risperidone]     Trazodone And Nefazodone     Wellbutrin [Bupropion]        Current Outpatient Medications:     insulin glargine (LANTUS SOLOSTAR) 100 UNIT/ML injection pen, 50 units at bedtime, Disp: 15 pen, Rfl: 3    Dulaglutide (TRULICITY) 8.62 AW/9.3GM SOPN, Inject 0.75 mg into the skin once a week, Disp: 4 pen, Rfl: 3    insulin lispro, 1 Unit Dial, (HUMALOG KWIKPEN) 100 UNIT/ML SOPN, 15  units at each meals, Disp: 10 pen, Rfl: 03    oxybutynin (DITROPAN-XL) 5 MG extended release tablet, take 1 tablet by mouth once daily, Disp: 90 tablet, Rfl: 3    atorvastatin (LIPITOR) 40 MG tablet, Take 1 tablet by mouth nightly, Disp: 30 tablet, Rfl: 3    RESTASIS 0.05 % ophthalmic emulsion, , Disp: , Rfl:     ibuprofen (ADVIL;MOTRIN) 800 MG tablet, take 1 tablet by mouth every 8 hours AS NEEDED FOR PAIN with food or milk, Disp: , Rfl:     neomycin-polymyxin-dexameth (MAXITROL) 3.5-10977-6.1 ophthalmic suspension, instill 1 drop into both eyes four times a day, Disp: , Rfl:     ARTIFICIAL TEARS 1.4 % ophthalmic solution, , Disp: , Rfl:     levothyroxine (SYNTHROID) 50 MCG tablet, take 1 tablet by mouth once daily, Disp: 30 tablet, Rfl: 5    montelukast (SINGULAIR) 10 MG tablet, Take 1 tablet by mouth nightly, Disp: 30 tablet, Rfl: 2    Continuous Blood Gluc Sensor (FREESTYLE FELY 14 DAY SENSOR) [facility], Every 2 weeks, Disp: 2 each, Rfl: 06    albuterol sulfate HFA (VENTOLIN HFA) 108 (90 Base) MCG/ACT inhaler, Inhale 2 puffs into the lungs as needed for Wheezing Every 4-6 hours PRN, Disp: 1 Inhaler, Rfl: 5    docusate sodium (COLACE, DULCOLAX) 100 MG CAPS, Take 100 mg by mouth 2 times daily, Disp: 60 capsule, Rfl: 1    prazosin (MINIPRESS) 2 MG capsule, Take 2 mg by mouth nightly , Disp: , Rfl:     sertraline (ZOLOFT) 100 MG tablet, Take 200 mg by mouth daily , Disp: , Rfl:     Alcohol Swabs (ALCOHOL PREP) 70 % PADS, qid, Disp: 300 each, Rfl: 06    Continuous Blood Gluc Sensor (FREESTYLE FELY 14 DAY SENSOR) MISC, Every 2 weeks, Disp: 2 each, Rfl: 06    Continuous Blood Gluc  (FREESTYLE FELY 14 DAY READER) CATHLEEN, As directed, Disp: 1 Device, Rfl: 00    Insulin Pen Needle (NOVOFINE) 32G X 6 MM MISC, qid, Disp: 300 each, Rfl: 3    furosemide (LASIX) 40 MG tablet, Take 1 tablet by mouth daily, Disp: 60 tablet, Rfl: 3    ticagrelor (BRILINTA) 90 MG TABS tablet, Take 90 mg by mouth 2 times daily, Disp: , Rfl:     CPAP Machine MISC, by Does not apply route New CPAP with 10 cm, Disp: 1 each, Rfl: 0    aspirin 81 MG EC tablet, Take 1 tablet by mouth daily, Disp: 30 tablet, Rfl: 3    OXYGEN, 2 lit O2 with sleep , please give O2 concentrator, Disp: 1 Units, Rfl: 0    Emollient (EUCERIN INTENSIVE REPAIR HAND) 2.5-10 % CREA, APPLY TO FEET AT BEDTIME; PLACE SOCKS OVER FEET AFTER APPLICATION IF NEEDED FOR DRY CRACKING FEET, Disp: , Rfl:     hydrOXYzine (VISTARIL) 25 MG capsule, Take 50 mg by mouth 3 times daily as needed , Disp: , Rfl:     Nutritional Supplements (GLUCERNA SHAKE) LIQD, take as directed three times a day, Disp: , Rfl:     insulin glargine (LANTUS) 100 UNIT/ML injection vial, Inject 30 Units into the skin nightly (Patient taking differently: Inject 35 Units into the skin nightly ), Disp: 1 vial, Rfl: 3    isosorbide dinitrate (ISORDIL) 20 MG tablet, Take 1 tablet by mouth 3 times daily, Disp: 90 tablet, Rfl: 3    nitroGLYCERIN (NITROSTAT) 0.4 MG SL tablet, up to max of 3 total doses. If no relief after 1 dose, call 911., Disp: 25 tablet, Rfl: 3    hydrALAZINE (APRESOLINE) 50 MG tablet, Take 1 tablet by mouth every 8 hours, Disp: 90 tablet, Rfl: 3    metoprolol succinate (TOPROL XL) 50 MG extended release tablet, Take 1 tablet by mouth 2 times daily, Disp: 30 tablet, Rfl: 3    amitriptyline (ELAVIL) 25 MG tablet, Take 25 mg by mouth nightly, Disp: , Rfl:     gabapentin (NEURONTIN) 400 MG capsule, Take 400 mg by mouth 2 times daily. AM and 800 mg in pm-9pm, Disp: , Rfl:     haloperidol (HALDOL) 5 MG tablet, Take 5 mg by mouth daily, Disp: , Rfl:     FreeStyle Lancets MISC, 1 each by Does not apply route daily, Disp: 100 each, Rfl: 3    blood glucose test strips (FREESTYLE LITE) strip, 1 each by In Vitro route daily As needed. , Disp: 100 each, Rfl: 3    folic acid (FOLVITE) 1 MG tablet, Take 1 mg by mouth daily, Disp: , Rfl:     Iron Polysacch [id number] (NIFEREX-150 FORTE PO), Take 1 tablet by mouth daily , Disp: , Rfl:     Cyanocobalamin (VITAMIN B-12) 1000 MCG extended release tablet, Take 1,000 mcg by mouth daily, Disp: , Rfl:     meclizine (ANTIVERT) 25 MG tablet, Take 25 mg by mouth three times daily, Disp: , Rfl:     albuterol (PROVENTIL) (2.5 MG/3ML) 0.083% nebulizer solution, Take 3 mLs by nebulization every 6 hours as needed for Wheezing, Disp: 120 each, Rfl: 5    Review of Systems   Cardiovascular: Negative. Endocrine: Negative. All other systems reviewed and are negative. Vitals:    03/23/21 1327   BP: 117/75   Pulse: 69   Weight: 247 lb (112 kg)   Height: 5' 4\" (1.626 m)       Objective:   Physical Exam  Constitutional:       Appearance: Normal appearance. She is obese. HENT:      Head: Normocephalic and atraumatic. Nose: Nose normal.   Eyes:      Extraocular Movements: Extraocular movements intact. Neck:      Musculoskeletal: Normal range of motion.    Cardiovascular: Rate and Rhythm: Normal rate. Pulmonary:      Effort: Pulmonary effort is normal.   Musculoskeletal:      Right lower leg: No edema. Left lower leg: No edema. Neurological:      General: No focal deficit present. Mental Status: She is alert. Psychiatric:         Mood and Affect: Mood is anxious. Assessment:       Diagnosis Orders   1. Type 2 diabetes mellitus with hyperglycemia, with long-term current use of insulin ([facility])  POCT Glucose    Basic Metabolic Panel    Hemoglobin A1C   2.  Hypothyroidism, unspecified type  T4, Free    TSH with Reflex           Plan:      Orders Placed This Encounter   Procedures    T4, Free     Standing Status:   Future     Standing Expiration Date:   3/23/2022    TSH with Reflex     Standing Status:   Future     Standing Expiration Date:   3/23/2022    Basic Metabolic Panel     Standing Status:   Future     Standing Expiration Date:   3/23/2022    Hemoglobin A1C     Standing Status:   Future     Standing Expiration Date:   3/23/2022    POCT Glucose     Increase Lantus to 50 units at bedtime increase Humalog to 15 with each meals add Trulicity once a week repeat labs in 3 months A1c goal of 7-7 or lower continue current dose of thyroid replacement patient education done about diet  Orders Placed This Encounter   Medications    insulin glargine (LANTUS SOLOSTAR) 100 UNIT/ML injection pen     Si units at bedtime     Dispense:  15 pen     Refill:  3    Dulaglutide (TRULICITY) 2.04 JS/5.7UN SOPN     Sig: Inject 0.75 mg into the skin once a week     Dispense:  4 pen     Refill:  3    insulin lispro, 1 Unit Dial, (HUMALOG KWIKPEN) 100 UNIT/ML SOPN     Sig: 15  units at each meals     Dispense:  10 pen     Refill:  Stefanie Metzger MD

## 2021-03-24 ENCOUNTER — TELEPHONE (OUTPATIENT)
Dept: ENDOCRINOLOGY | Age: 66
End: 2021-03-24

## 2021-03-24 RX ORDER — LEVOTHYROXINE SODIUM 0.05 MG/1
TABLET ORAL
Qty: 30 TABLET | Refills: 5 | Status: SHIPPED | OUTPATIENT
Start: 2021-03-24 | End: 2021-01-01 | Stop reason: SDUPTHER

## 2021-03-24 RX ORDER — UBIQUINOL 100 MG
CAPSULE ORAL
Qty: 300 EACH | Refills: 6 | Status: SHIPPED | OUTPATIENT
Start: 2021-03-24 | End: 2021-01-01 | Stop reason: SDUPTHER

## 2021-03-24 RX ORDER — FLASH GLUCOSE SENSOR
KIT MISCELLANEOUS
Qty: 2 EACH | Refills: 6 | Status: SHIPPED | OUTPATIENT
Start: 2021-03-24 | End: 2021-01-01 | Stop reason: SDUPTHER

## 2021-03-25 ENCOUNTER — OFFICE VISIT (OUTPATIENT)
Dept: PULMONOLOGY | Age: 66
End: 2021-03-25
Payer: COMMERCIAL

## 2021-03-25 VITALS
OXYGEN SATURATION: 94 % | DIASTOLIC BLOOD PRESSURE: 57 MMHG | HEIGHT: 64 IN | HEART RATE: 91 BPM | TEMPERATURE: 97.7 F | SYSTOLIC BLOOD PRESSURE: 123 MMHG | RESPIRATION RATE: 16 BRPM | WEIGHT: 250 LBS | BODY MASS INDEX: 42.68 KG/M2

## 2021-03-25 DIAGNOSIS — I50.9 CONGESTIVE HEART FAILURE, UNSPECIFIED HF CHRONICITY, UNSPECIFIED HEART FAILURE TYPE (HCC): ICD-10-CM

## 2021-03-25 DIAGNOSIS — G47.34 NOCTURNAL HYPOXIA: ICD-10-CM

## 2021-03-25 DIAGNOSIS — E66.9 OBESITY (BMI 30-39.9): ICD-10-CM

## 2021-03-25 DIAGNOSIS — J45.20 MILD INTERMITTENT ASTHMA WITHOUT COMPLICATION: Primary | ICD-10-CM

## 2021-03-25 DIAGNOSIS — G47.33 OSA (OBSTRUCTIVE SLEEP APNEA): ICD-10-CM

## 2021-03-25 PROCEDURE — G8417 CALC BMI ABV UP PARAM F/U: HCPCS | Performed by: INTERNAL MEDICINE

## 2021-03-25 PROCEDURE — G8427 DOCREV CUR MEDS BY ELIG CLIN: HCPCS | Performed by: INTERNAL MEDICINE

## 2021-03-25 PROCEDURE — 3017F COLORECTAL CA SCREEN DOC REV: CPT | Performed by: INTERNAL MEDICINE

## 2021-03-25 PROCEDURE — 99214 OFFICE O/P EST MOD 30 MIN: CPT | Performed by: INTERNAL MEDICINE

## 2021-03-25 PROCEDURE — 1036F TOBACCO NON-USER: CPT | Performed by: INTERNAL MEDICINE

## 2021-03-25 PROCEDURE — G8484 FLU IMMUNIZE NO ADMIN: HCPCS | Performed by: INTERNAL MEDICINE

## 2021-03-25 RX ORDER — MONTELUKAST SODIUM 10 MG/1
10 TABLET ORAL NIGHTLY
Qty: 30 TABLET | Refills: 5 | Status: SHIPPED | OUTPATIENT
Start: 2021-03-25 | End: 2021-01-01 | Stop reason: SDUPTHER

## 2021-03-25 RX ORDER — ALBUTEROL SULFATE 2.5 MG/3ML
2.5 SOLUTION RESPIRATORY (INHALATION) EVERY 6 HOURS PRN
Qty: 120 EACH | Refills: 5 | Status: SHIPPED | OUTPATIENT
Start: 2021-03-25 | End: 2021-01-01 | Stop reason: SDUPTHER

## 2021-03-25 RX ORDER — ALBUTEROL SULFATE 90 UG/1
2 AEROSOL, METERED RESPIRATORY (INHALATION) PRN
Qty: 1 INHALER | Refills: 5 | Status: SHIPPED | OUTPATIENT
Start: 2021-03-25 | End: 2021-01-01 | Stop reason: SDUPTHER

## 2021-03-25 ASSESSMENT — ENCOUNTER SYMPTOMS
EYE ITCHING: 0
TROUBLE SWALLOWING: 0
CHEST TIGHTNESS: 0
SORE THROAT: 0
VOICE CHANGE: 0
EYE DISCHARGE: 0
ABDOMINAL PAIN: 0
WHEEZING: 0
DIARRHEA: 0
SINUS PRESSURE: 0
RHINORRHEA: 0
VOMITING: 0
COUGH: 0
SHORTNESS OF BREATH: 1
NAUSEA: 0

## 2021-03-25 NOTE — PROGRESS NOTES
filing. User may not have seen previous data.)    Bathroom Equipment: Grab bars in shower, Shower chair    Home Equipment: Rolling walker, [address] bed    ADL Assistance: Needs assistance    Homemaking Assistance: Needs assistance    Homemaking Responsibilities: No    Ambulation Assistance: Independent    Transfer Assistance: Independent    Active : No    Additional Comments: Pt wears orthopedic shoes at baseline    The patient states she is current with [facility](RN per the ). The patient had a walker, but obtained a hospital bed, wheel chair, BSC and O2 last admission (from [address] [place] [address] Road). pharmacy is [address] [place] [address],[address] [address]., [facility]. Transportation is provided by [employer]	[place] () per the patient         Review of Systems   Constitutional: Negative for chills, diaphoresis, fatigue and fever. HENT: Negative for congestion, mouth sores, nosebleeds, postnasal drip, rhinorrhea, sinus pressure, sneezing, sore throat, trouble swallowing and voice change. Eyes: Negative for discharge, itching and visual disturbance. Respiratory: Positive for shortness of breath. Negative for cough, chest tightness and wheezing. Cardiovascular: Negative for chest pain, palpitations and leg swelling. Gastrointestinal: Negative for abdominal pain, diarrhea, nausea and vomiting. Genitourinary: Negative for difficulty urinating and hematuria. Musculoskeletal: Negative for arthralgias, joint swelling and myalgias. Skin: Negative for rash. Allergic/Immunologic: Negative for environmental allergies and food allergies. Neurological: Negative for dizziness, tremors, weakness and headaches. Psychiatric/Behavioral: Positive for sleep disturbance. Negative for behavioral problems.          :     Vitals:    03/25/21 1434   BP: (!) 123/57   Pulse: 91   Resp: 16   Temp: 97.7 °F (36.5 °C)   SpO2: 94%   Weight: 250 lb (113.4 kg)   Height: 5' 4\" (1.626 m) Wt Readings from Last 3 Encounters:   03/25/21 250 lb (113.4 kg)   03/23/21 247 lb (112 kg)   03/09/21 246 lb 4.8 oz (111.7 kg)         Physical Exam  Constitutional:       General: She is not in acute distress. Appearance: She is well-developed. She is obese. She is not diaphoretic. HENT:      Head: Normocephalic and atraumatic. Nose: Nose normal.   Eyes:      Pupils: Pupils are equal, round, and reactive to light. Neck:      Thyroid: No thyromegaly. Vascular: No JVD. Trachea: No tracheal deviation. Cardiovascular:      Rate and Rhythm: Normal rate and regular rhythm. Heart sounds: No murmur. No friction rub. No gallop. Pulmonary:      Effort: No respiratory distress. Breath sounds: No wheezing or rales. Comments: diminished Breath sound bilaterally. Chest:      Chest wall: No tenderness. Abdominal:      General: There is no distension. Tenderness: There is no abdominal tenderness. There is no rebound. Musculoskeletal: Normal range of motion. Lymphadenopathy:      Cervical: No cervical adenopathy. Skin:     General: Skin is warm and dry. Neurological:      Mental Status: She is alert and oriented to person, place, and time.       Coordination: Coordination normal.         Current Outpatient Medications   Medication Sig Dispense Refill    albuterol (PROVENTIL) (2.5 MG/3ML) 0.083% nebulizer solution Take 3 mLs by nebulization every 6 hours as needed for Wheezing 120 each 5    albuterol sulfate HFA (VENTOLIN HFA) 108 (90 Base) MCG/ACT inhaler Inhale 2 puffs into the lungs as needed for Wheezing Every 4-6 hours PRN 1 Inhaler 5    montelukast (SINGULAIR) 10 MG tablet Take 1 tablet by mouth nightly 30 tablet 5    levothyroxine (SYNTHROID) 50 MCG tablet take 1 tablet by mouth once daily 30 tablet 5    Continuous Blood Gluc Sensor (FREESTYLE FELY 14 DAY SENSOR) MIS Every 2 weeks 2 each 06    Alcohol Swabs (ALCOHOL PREP) 70 % PADS qid 300 each 06    Insulin Pen Needle (NOVOFINE) 32G X 6 MM MISC qid 300 each 3    insulin glargine (LANTUS SOLOSTAR) 100 UNIT/ML injection pen 50 units at bedtime 15 pen 3    Dulaglutide (TRULICITY) 2.10 PU/4.8VX SOPN Inject 0.75 mg into the skin once a week 4 pen 3    insulin lispro, 1 Unit Dial, (HUMALOG KWIKPEN) 100 UNIT/ML SOPN 15  units at each meals 10 pen 03    oxybutynin (DITROPAN-XL) 5 MG extended release tablet take 1 tablet by mouth once daily 90 tablet 3    atorvastatin (LIPITOR) 40 MG tablet Take 1 tablet by mouth nightly 30 tablet 3    RESTASIS 0.05 % ophthalmic emulsion       ibuprofen (ADVIL;MOTRIN) 800 MG tablet take 1 tablet by mouth every 8 hours AS NEEDED FOR PAIN with food or milk      neomycin-polymyxin-dexameth (MAXITROL) 3.5-88860-5.1 ophthalmic suspension instill 1 drop into both eyes four times a day      ARTIFICIAL TEARS 1.4 % ophthalmic solution       docusate sodium (COLACE, DULCOLAX) 100 MG CAPS Take 100 mg by mouth 2 times daily 60 capsule 1    prazosin (MINIPRESS) 2 MG capsule Take 2 mg by mouth nightly       sertraline (ZOLOFT) 100 MG tablet Take 200 mg by mouth daily       Continuous Blood Gluc Sensor (FREESTYLE FELY 14 DAY SENSOR) MISC Every 2 weeks 2 each 06    Continuous Blood Gluc  (FREESTYLE FELY 14 DAY READER) CATHLEEN As directed 1 Device 00    furosemide (LASIX) 40 MG tablet Take 1 tablet by mouth daily 60 tablet 3    ticagrelor (BRILINTA) 90 MG TABS tablet Take 90 mg by mouth 2 times daily      CPAP Machine MISC by Does not apply route New CPAP with 10 cm 1 each 0    aspirin 81 MG EC tablet Take 1 tablet by mouth daily 30 tablet 3    OXYGEN 2 lit O2 with sleep , please give O2 concentrator 1 Units 0    Emollient (EUCERIN INTENSIVE REPAIR HAND) 2.5-10 % CREA APPLY TO FEET AT BEDTIME; PLACE SOCKS OVER FEET AFTER APPLICATION IF NEEDED FOR DRY CRACKING FEET      hydrOXYzine (VISTARIL) 25 MG capsule Take 50 mg by mouth 3 times daily as needed       Nutritional Supplements ([address]) LIQD take as directed three times a day      insulin glargine (LANTUS) 100 UNIT/ML injection vial Inject 30 Units into the skin nightly (Patient taking differently: Inject 35 Units into the skin nightly ) 1 vial 3    isosorbide dinitrate (ISORDIL) 20 MG tablet Take 1 tablet by mouth 3 times daily 90 tablet 3    nitroGLYCERIN (NITROSTAT) 0.4 MG SL tablet up to max of 3 total doses. If no relief after 1 dose, call 911. 25 tablet 3    hydrALAZINE (APRESOLINE) 50 MG tablet Take 1 tablet by mouth every 8 hours 90 tablet 3    metoprolol succinate (TOPROL XL) 50 MG extended release tablet Take 1 tablet by mouth 2 times daily 30 tablet 3    amitriptyline (ELAVIL) 25 MG tablet Take 25 mg by mouth nightly      gabapentin (NEURONTIN) 400 MG capsule Take 400 mg by mouth 2 times daily. AM and 800 mg in pm-9pm      haloperidol (HALDOL) 5 MG tablet Take 5 mg by mouth daily      FreeStyle Lancets MISC 1 each by Does not apply route daily 100 each 3    blood glucose test strips (FREESTYLE LITE) strip 1 each by In Vitro route daily As needed. 853 each 3    folic acid (FOLVITE) 1 MG tablet Take 1 mg by mouth daily      Iron Polysacch [id number] (NIFEREX-150 FORTE PO) Take 1 tablet by mouth daily       Cyanocobalamin (VITAMIN B-12) 1000 MCG extended release tablet Take 1,000 mcg by mouth daily      meclizine (ANTIVERT) 25 MG tablet Take 25 mg by mouth three times daily       No current facility-administered medications for this visit. Results for orders placed during the hospital encounter of 06/28/20   XR CHEST STANDARD (2 VW)    Narrative XR CHEST (2 VW) : 6/29/2020    CLINICAL HISTORY:  SOB .    COMPARISON: 6/28/2020. TECHNIQUE: Upright AP and lateral radiographs of the chest were obtained. FINDINGS:     A shallow inspiration is present without significant infiltrate identified. The heart remains mildly enlarged with postoperative changes from previous CABG.     There is Inhaler; Refill: 5  - montelukast (SINGULAIR) 10 MG tablet; Take 1 tablet by mouth nightly  Dispense: 30 tablet; Refill: 5    2. JESICA (obstructive sleep apnea)  She is using CPAP with  10  centimeters of H2O with heated humidity and 2 lit She is using CPAP for about   6-7  hours every night. She is using CPAP with full face   Mask. She said  sleep is restful with the CPAP use. She is compliant with CPAP therapy and benefiting with CPAP use. No snoring with CPAP use. Continue CPAP therapy as before. Counseling: CPAP/BiPAP uses, She advised to use CPAP at least 5-6 hours every night. Sleep hygiene:Avoid supine sleep, sleep on  sides. Avoid  sleep deprivation. Explained sleep hygiene. Advice to avoid Alcohol and sedative     3. Nocturnal hypoxia  She is on 2 L O2 during sleep continue O2 to keep saturation 90% or above. 4. Congestive heart failure, unspecified HF chronicity, unspecified heart failure type [facility])  She is following with cardiology    5. Obesity (BMI 30-39. 9)  She is advised try to lose weight. obesity related risk explained to the patient ,  Current weight:  250 lb (113.4 kg) Lbs. BMI:  Body mass index is 42.91 kg/m². Suggested weight control approaches, including dietary changes , exercise, behavioral modification. Return in about 3 months (around 6/25/2021) for asthma, jesica.       Lizzie Beltran MD

## 2021-04-06 ENCOUNTER — APPOINTMENT (OUTPATIENT)
Dept: GENERAL RADIOLOGY | Age: 66
DRG: 190 | End: 2021-04-06
Payer: COMMERCIAL

## 2021-04-06 ENCOUNTER — HOSPITAL ENCOUNTER (INPATIENT)
Age: 66
LOS: 1 days | Discharge: HOME | DRG: 190 | End: 2021-04-07
Attending: INTERNAL MEDICINE | Admitting: INTERNAL MEDICINE
Payer: COMMERCIAL

## 2021-04-06 ENCOUNTER — APPOINTMENT (OUTPATIENT)
Dept: CARDIAC CATH/INVASIVE PROCEDURES | Age: 66
DRG: 190 | End: 2021-04-06
Payer: COMMERCIAL

## 2021-04-06 DIAGNOSIS — I21.4 NSTEMI (NON-ST ELEVATED MYOCARDIAL INFARCTION) (HCC): Primary | ICD-10-CM

## 2021-04-06 DIAGNOSIS — I70.245 ATHSCL NATIVE ARTERIES OF LEFT LEG W ULCERATION OTH PRT FOOT (HCC): ICD-10-CM

## 2021-04-06 DIAGNOSIS — Z79.4 TYPE 2 DIABETES MELLITUS WITH HYPERGLYCEMIA, WITH LONG-TERM CURRENT USE OF INSULIN (HCC): ICD-10-CM

## 2021-04-06 DIAGNOSIS — E11.65 TYPE 2 DIABETES MELLITUS WITH HYPERGLYCEMIA, WITH LONG-TERM CURRENT USE OF INSULIN (HCC): ICD-10-CM

## 2021-04-06 DIAGNOSIS — Z89.421 S/P AMPUTATION OF LESSER TOE, RIGHT (HCC): Chronic | ICD-10-CM

## 2021-04-06 LAB
ALBUMIN SERPL-MCNC: 3.6 G/DL (ref 3.5–4.6)
ALP BLD-CCNC: 87 U/L (ref 40–130)
ALT SERPL-CCNC: 16 U/L (ref 0–33)
ANION GAP SERPL CALCULATED.3IONS-SCNC: 13 MEQ/L (ref 9–15)
ANISOCYTOSIS: ABNORMAL
ANTI-XA UNFRAC HEPARIN: 0.97 IU/ML
APTT: 26.7 SEC (ref 24.4–36.8)
AST SERPL-CCNC: 20 U/L (ref 0–35)
BASOPHILS ABSOLUTE: 0 K/UL (ref 0–0.2)
BASOPHILS RELATIVE PERCENT: 0.4 %
BILIRUB SERPL-MCNC: 0.3 MG/DL (ref 0.2–0.7)
BILIRUBIN URINE: NEGATIVE
BLOOD, URINE: NEGATIVE
BUN BLDV-MCNC: 52 MG/DL (ref 8–23)
CALCIUM SERPL-MCNC: 8.9 MG/DL (ref 8.5–9.9)
CHLORIDE BLD-SCNC: 95 MEQ/L (ref 95–107)
CLARITY: CLEAR
CO2: 27 MEQ/L (ref 20–31)
COLOR: YELLOW
CREAT SERPL-MCNC: 1.3 MG/DL (ref 0.5–0.9)
D DIMER: 0.42 MG/L FEU (ref 0–0.5)
EKG ATRIAL RATE: 62 BPM
EKG ATRIAL RATE: 70 BPM
EKG P AXIS: 45 DEGREES
EKG P AXIS: 51 DEGREES
EKG P-R INTERVAL: 212 MS
EKG P-R INTERVAL: 212 MS
EKG Q-T INTERVAL: 398 MS
EKG Q-T INTERVAL: 436 MS
EKG QRS DURATION: 98 MS
EKG QRS DURATION: 98 MS
EKG QTC CALCULATION (BAZETT): 429 MS
EKG QTC CALCULATION (BAZETT): 442 MS
EKG R AXIS: 61 DEGREES
EKG R AXIS: 83 DEGREES
EKG T AXIS: 254 DEGREES
EKG T AXIS: 256 DEGREES
EKG VENTRICULAR RATE: 62 BPM
EKG VENTRICULAR RATE: 70 BPM
EOSINOPHILS ABSOLUTE: 0.2 K/UL (ref 0–0.7)
EOSINOPHILS RELATIVE PERCENT: 2.3 %
GFR AFRICAN AMERICAN: 50
GFR NON-AFRICAN AMERICAN: 41.3
GLOBULIN: 3.3 G/DL (ref 2.3–3.5)
GLUCOSE BLD-MCNC: 121 MG/DL (ref 60–115)
GLUCOSE BLD-MCNC: 136 MG/DL (ref 60–115)
GLUCOSE BLD-MCNC: 157 MG/DL (ref 60–115)
GLUCOSE BLD-MCNC: 223 MG/DL (ref 70–99)
GLUCOSE URINE: NEGATIVE MG/DL
HCT VFR BLD CALC: 34.8 % (ref 37–47)
HCT VFR BLD CALC: 35.2 % (ref 37–47)
HEMOGLOBIN: 11.6 G/DL (ref 12–16)
HEMOGLOBIN: 11.7 G/DL (ref 12–16)
HYPOCHROMIA: ABNORMAL
INR BLD: 1
KETONES, URINE: NEGATIVE MG/DL
LEUKOCYTE ESTERASE, URINE: NEGATIVE
LYMPHOCYTES ABSOLUTE: 2.7 K/UL (ref 1–4.8)
LYMPHOCYTES RELATIVE PERCENT: 25.5 %
MAGNESIUM: 2.2 MG/DL (ref 1.7–2.4)
MCH RBC QN AUTO: 24.6 PG (ref 27–31.3)
MCH RBC QN AUTO: 24.8 PG (ref 27–31.3)
MCHC RBC AUTO-ENTMCNC: 33.2 % (ref 33–37)
MCHC RBC AUTO-ENTMCNC: 33.2 % (ref 33–37)
MCV RBC AUTO: 74.1 FL (ref 82–100)
MCV RBC AUTO: 74.6 FL (ref 82–100)
MICROCYTES: ABNORMAL
MONOCYTES ABSOLUTE: 0.9 K/UL (ref 0.2–0.8)
MONOCYTES RELATIVE PERCENT: 8.8 %
NEUTROPHILS ABSOLUTE: 6.6 K/UL (ref 1.4–6.5)
NEUTROPHILS RELATIVE PERCENT: 63 %
NITRITE, URINE: NEGATIVE
OVALOCYTES: ABNORMAL
PDW BLD-RTO: 18.8 % (ref 11.5–14.5)
PDW BLD-RTO: 19.1 % (ref 11.5–14.5)
PERFORMED ON: ABNORMAL
PH UA: 5.5 (ref 5–9)
PLATELET # BLD: 238 K/UL (ref 130–400)
PLATELET # BLD: 245 K/UL (ref 130–400)
POTASSIUM SERPL-SCNC: 4.5 MEQ/L (ref 3.4–4.9)
PRO-BNP: 5970 PG/ML
PROTEIN UA: NEGATIVE MG/DL
PROTHROMBIN TIME: 13.6 SEC (ref 12.3–14.9)
RBC # BLD: 4.7 M/UL (ref 4.2–5.4)
RBC # BLD: 4.72 M/UL (ref 4.2–5.4)
SARS-COV-2, NAAT: NOT DETECTED
SODIUM BLD-SCNC: 135 MEQ/L (ref 135–144)
SPECIFIC GRAVITY UA: 1.01 (ref 1–1.03)
TOTAL CK: 111 U/L (ref 0–170)
TOTAL PROTEIN: 6.9 G/DL (ref 6.3–8)
TROPONIN: 0.31 NG/ML (ref 0–0.01)
TROPONIN: 0.32 NG/ML (ref 0–0.01)
TROPONIN: 0.34 NG/ML (ref 0–0.01)
TSH SERPL DL<=0.05 MIU/L-ACNC: 1.39 UIU/ML (ref 0.44–3.86)
URINE REFLEX TO CULTURE: NORMAL
UROBILINOGEN, URINE: 0.2 E.U./DL
WBC # BLD: 10.5 K/UL (ref 4.8–10.8)
WBC # BLD: 10.9 K/UL (ref 4.8–10.8)

## 2021-04-06 PROCEDURE — 2500000003 HC RX 250 WO HCPCS: Performed by: STUDENT IN AN ORGANIZED HEALTH CARE EDUCATION/TRAINING PROGRAM

## 2021-04-06 PROCEDURE — 85379 FIBRIN DEGRADATION QUANT: CPT

## 2021-04-06 PROCEDURE — 82550 ASSAY OF CK (CPK): CPT

## 2021-04-06 PROCEDURE — B2131ZZ FLUOROSCOPY OF MULTIPLE CORONARY ARTERY BYPASS GRAFTS USING LOW OSMOLAR CONTRAST: ICD-10-PCS | Performed by: INTERNAL MEDICINE

## 2021-04-06 PROCEDURE — 2580000003 HC RX 258

## 2021-04-06 PROCEDURE — B2111ZZ FLUOROSCOPY OF MULTIPLE CORONARY ARTERIES USING LOW OSMOLAR CONTRAST: ICD-10-PCS | Performed by: INTERNAL MEDICINE

## 2021-04-06 PROCEDURE — 94761 N-INVAS EAR/PLS OXIMETRY MLT: CPT

## 2021-04-06 PROCEDURE — 2500000003 HC RX 250 WO HCPCS

## 2021-04-06 PROCEDURE — C1887 CATHETER, GUIDING: HCPCS

## 2021-04-06 PROCEDURE — 83735 ASSAY OF MAGNESIUM: CPT

## 2021-04-06 PROCEDURE — 99223 1ST HOSP IP/OBS HIGH 75: CPT | Performed by: INTERNAL MEDICINE

## 2021-04-06 PROCEDURE — 80053 COMPREHEN METABOLIC PANEL: CPT

## 2021-04-06 PROCEDURE — 2580000003 HC RX 258: Performed by: INTERNAL MEDICINE

## 2021-04-06 PROCEDURE — 71045 X-RAY EXAM CHEST 1 VIEW: CPT

## 2021-04-06 PROCEDURE — 36415 COLL VENOUS BLD VENIPUNCTURE: CPT

## 2021-04-06 PROCEDURE — B2151ZZ FLUOROSCOPY OF LEFT HEART USING LOW OSMOLAR CONTRAST: ICD-10-PCS | Performed by: INTERNAL MEDICINE

## 2021-04-06 PROCEDURE — 6370000000 HC RX 637 (ALT 250 FOR IP): Performed by: INTERNAL MEDICINE

## 2021-04-06 PROCEDURE — 85610 PROTHROMBIN TIME: CPT

## 2021-04-06 PROCEDURE — 85520 HEPARIN ASSAY: CPT

## 2021-04-06 PROCEDURE — 93459 L HRT ART/GRFT ANGIO: CPT | Performed by: INTERNAL MEDICINE

## 2021-04-06 PROCEDURE — 96366 THER/PROPH/DIAG IV INF ADDON: CPT

## 2021-04-06 PROCEDURE — 84484 ASSAY OF TROPONIN QUANT: CPT

## 2021-04-06 PROCEDURE — 2000000000 HC ICU R&B

## 2021-04-06 PROCEDURE — 93005 ELECTROCARDIOGRAM TRACING: CPT | Performed by: EMERGENCY MEDICINE

## 2021-04-06 PROCEDURE — 6360000004 HC RX CONTRAST MEDICATION: Performed by: INTERNAL MEDICINE

## 2021-04-06 PROCEDURE — C1725 CATH, TRANSLUMIN NON-LASER: HCPCS

## 2021-04-06 PROCEDURE — 85730 THROMBOPLASTIN TIME PARTIAL: CPT

## 2021-04-06 PROCEDURE — C1769 GUIDE WIRE: HCPCS

## 2021-04-06 PROCEDURE — 96375 TX/PRO/DX INJ NEW DRUG ADDON: CPT

## 2021-04-06 PROCEDURE — 93010 ELECTROCARDIOGRAM REPORT: CPT | Performed by: INTERNAL MEDICINE

## 2021-04-06 PROCEDURE — 87635 SARS-COV-2 COVID-19 AMP PRB: CPT

## 2021-04-06 PROCEDURE — 81003 URINALYSIS AUTO W/O SCOPE: CPT

## 2021-04-06 PROCEDURE — 6360000002 HC RX W HCPCS

## 2021-04-06 PROCEDURE — 85027 COMPLETE CBC AUTOMATED: CPT

## 2021-04-06 PROCEDURE — 2580000003 HC RX 258: Performed by: NURSE PRACTITIONER

## 2021-04-06 PROCEDURE — 84443 ASSAY THYROID STIM HORMONE: CPT

## 2021-04-06 PROCEDURE — C1760 CLOSURE DEV, VASC: HCPCS

## 2021-04-06 PROCEDURE — 85025 COMPLETE CBC W/AUTO DIFF WBC: CPT

## 2021-04-06 PROCEDURE — 83880 ASSAY OF NATRIURETIC PEPTIDE: CPT

## 2021-04-06 PROCEDURE — 99283 EMERGENCY DEPT VISIT LOW MDM: CPT

## 2021-04-06 PROCEDURE — 96365 THER/PROPH/DIAG IV INF INIT: CPT

## 2021-04-06 PROCEDURE — 2709999900 HC NON-CHARGEABLE SUPPLY

## 2021-04-06 PROCEDURE — 6370000000 HC RX 637 (ALT 250 FOR IP): Performed by: STUDENT IN AN ORGANIZED HEALTH CARE EDUCATION/TRAINING PROGRAM

## 2021-04-06 PROCEDURE — 6360000002 HC RX W HCPCS: Performed by: STUDENT IN AN ORGANIZED HEALTH CARE EDUCATION/TRAINING PROGRAM

## 2021-04-06 PROCEDURE — C1894 INTRO/SHEATH, NON-LASER: HCPCS

## 2021-04-06 PROCEDURE — 94640 AIRWAY INHALATION TREATMENT: CPT

## 2021-04-06 PROCEDURE — 4A023N7 MEASUREMENT OF CARDIAC SAMPLING AND PRESSURE, LEFT HEART, PERCUTANEOUS APPROACH: ICD-10-PCS | Performed by: INTERNAL MEDICINE

## 2021-04-06 RX ORDER — METOPROLOL SUCCINATE 50 MG/1
50 TABLET, EXTENDED RELEASE ORAL 2 TIMES DAILY
Status: DISCONTINUED | OUTPATIENT
Start: 2021-04-06 | End: 2021-04-07 | Stop reason: HOSPADM

## 2021-04-06 RX ORDER — ONDANSETRON 2 MG/ML
4 INJECTION INTRAMUSCULAR; INTRAVENOUS ONCE
Status: COMPLETED | OUTPATIENT
Start: 2021-04-06 | End: 2021-04-06

## 2021-04-06 RX ORDER — NICOTINE POLACRILEX 4 MG
15 LOZENGE BUCCAL PRN
Status: DISCONTINUED | OUTPATIENT
Start: 2021-04-06 | End: 2021-04-07 | Stop reason: HOSPADM

## 2021-04-06 RX ORDER — ALBUTEROL SULFATE 2.5 MG/3ML
2.5 SOLUTION RESPIRATORY (INHALATION) EVERY 6 HOURS PRN
Status: DISCONTINUED | OUTPATIENT
Start: 2021-04-06 | End: 2021-04-07 | Stop reason: HOSPADM

## 2021-04-06 RX ORDER — HEPARIN SODIUM 1000 [USP'U]/ML
40 INJECTION, SOLUTION INTRAVENOUS; SUBCUTANEOUS PRN
Status: CANCELLED | OUTPATIENT
Start: 2021-04-06

## 2021-04-06 RX ORDER — HEPARIN SODIUM 1000 [USP'U]/ML
4000 INJECTION, SOLUTION INTRAVENOUS; SUBCUTANEOUS PRN
Status: DISCONTINUED | OUTPATIENT
Start: 2021-04-06 | End: 2021-04-06

## 2021-04-06 RX ORDER — ASPIRIN 81 MG/1
81 TABLET ORAL DAILY
Status: DISCONTINUED | OUTPATIENT
Start: 2021-04-06 | End: 2021-04-06 | Stop reason: SDUPTHER

## 2021-04-06 RX ORDER — MONTELUKAST SODIUM 10 MG/1
10 TABLET ORAL NIGHTLY
Status: DISCONTINUED | OUTPATIENT
Start: 2021-04-06 | End: 2021-04-07 | Stop reason: HOSPADM

## 2021-04-06 RX ORDER — ASPIRIN 325 MG
325 TABLET ORAL ONCE
Status: COMPLETED | OUTPATIENT
Start: 2021-04-06 | End: 2021-04-06

## 2021-04-06 RX ORDER — GABAPENTIN 400 MG/1
400 CAPSULE ORAL 2 TIMES DAILY
Status: DISCONTINUED | OUTPATIENT
Start: 2021-04-06 | End: 2021-04-07 | Stop reason: HOSPADM

## 2021-04-06 RX ORDER — DEXTROSE MONOHYDRATE 50 MG/ML
100 INJECTION, SOLUTION INTRAVENOUS PRN
Status: DISCONTINUED | OUTPATIENT
Start: 2021-04-06 | End: 2021-04-07 | Stop reason: HOSPADM

## 2021-04-06 RX ORDER — HEPARIN SODIUM 1000 [USP'U]/ML
80 INJECTION, SOLUTION INTRAVENOUS; SUBCUTANEOUS ONCE
Status: CANCELLED | OUTPATIENT
Start: 2021-04-06 | End: 2021-04-06

## 2021-04-06 RX ORDER — 0.9 % SODIUM CHLORIDE 0.9 %
1000 INTRAVENOUS SOLUTION INTRAVENOUS ONCE
Status: DISCONTINUED | OUTPATIENT
Start: 2021-04-06 | End: 2021-04-07 | Stop reason: HOSPADM

## 2021-04-06 RX ORDER — SODIUM CHLORIDE 9 MG/ML
INJECTION, SOLUTION INTRAVENOUS CONTINUOUS
Status: DISCONTINUED | OUTPATIENT
Start: 2021-04-06 | End: 2021-04-07

## 2021-04-06 RX ORDER — DEXTROSE MONOHYDRATE 25 G/50ML
12.5 INJECTION, SOLUTION INTRAVENOUS PRN
Status: DISCONTINUED | OUTPATIENT
Start: 2021-04-06 | End: 2021-04-07 | Stop reason: HOSPADM

## 2021-04-06 RX ORDER — MORPHINE SULFATE 2 MG/ML
2 INJECTION, SOLUTION INTRAMUSCULAR; INTRAVENOUS EVERY 4 HOURS PRN
Status: DISCONTINUED | OUTPATIENT
Start: 2021-04-06 | End: 2021-04-07 | Stop reason: HOSPADM

## 2021-04-06 RX ORDER — HYDRALAZINE HYDROCHLORIDE 50 MG/1
50 TABLET, FILM COATED ORAL EVERY 8 HOURS SCHEDULED
Status: DISCONTINUED | OUTPATIENT
Start: 2021-04-06 | End: 2021-04-07 | Stop reason: HOSPADM

## 2021-04-06 RX ORDER — ISOSORBIDE DINITRATE 10 MG/1
20 TABLET ORAL 3 TIMES DAILY
Status: DISCONTINUED | OUTPATIENT
Start: 2021-04-06 | End: 2021-04-07 | Stop reason: HOSPADM

## 2021-04-06 RX ORDER — SODIUM CHLORIDE 9 MG/ML
INJECTION, SOLUTION INTRAVENOUS CONTINUOUS
Status: DISCONTINUED | OUTPATIENT
Start: 2021-04-06 | End: 2021-04-06

## 2021-04-06 RX ORDER — ATORVASTATIN CALCIUM 40 MG/1
40 TABLET, FILM COATED ORAL NIGHTLY
Status: DISCONTINUED | OUTPATIENT
Start: 2021-04-06 | End: 2021-04-07 | Stop reason: HOSPADM

## 2021-04-06 RX ORDER — IPRATROPIUM BROMIDE AND ALBUTEROL SULFATE 2.5; .5 MG/3ML; MG/3ML
1 SOLUTION RESPIRATORY (INHALATION) 4 TIMES DAILY
Status: DISCONTINUED | OUTPATIENT
Start: 2021-04-06 | End: 2021-04-07 | Stop reason: HOSPADM

## 2021-04-06 RX ORDER — SODIUM CHLORIDE 0.9 % (FLUSH) 0.9 %
10 SYRINGE (ML) INJECTION PRN
Status: DISCONTINUED | OUTPATIENT
Start: 2021-04-06 | End: 2021-04-07 | Stop reason: HOSPADM

## 2021-04-06 RX ORDER — FUROSEMIDE 40 MG/1
40 TABLET ORAL DAILY
Status: DISCONTINUED | OUTPATIENT
Start: 2021-04-06 | End: 2021-04-07 | Stop reason: HOSPADM

## 2021-04-06 RX ORDER — HEPARIN SODIUM 1000 [USP'U]/ML
2000 INJECTION, SOLUTION INTRAVENOUS; SUBCUTANEOUS PRN
Status: DISCONTINUED | OUTPATIENT
Start: 2021-04-06 | End: 2021-04-06

## 2021-04-06 RX ORDER — FOLIC ACID 1 MG/1
1 TABLET ORAL DAILY
Status: DISCONTINUED | OUTPATIENT
Start: 2021-04-06 | End: 2021-04-07 | Stop reason: HOSPADM

## 2021-04-06 RX ORDER — IPRATROPIUM BROMIDE AND ALBUTEROL SULFATE 2.5; .5 MG/3ML; MG/3ML
1 SOLUTION RESPIRATORY (INHALATION) EVERY 4 HOURS PRN
Status: DISCONTINUED | OUTPATIENT
Start: 2021-04-06 | End: 2021-04-07 | Stop reason: HOSPADM

## 2021-04-06 RX ORDER — MECLIZINE HYDROCHLORIDE 25 MG/1
25 TABLET ORAL 3 TIMES DAILY
Status: DISCONTINUED | OUTPATIENT
Start: 2021-04-06 | End: 2021-04-07 | Stop reason: HOSPADM

## 2021-04-06 RX ORDER — HYDROXYZINE PAMOATE 50 MG/1
50 CAPSULE ORAL 3 TIMES DAILY PRN
Status: DISCONTINUED | OUTPATIENT
Start: 2021-04-06 | End: 2021-04-07 | Stop reason: HOSPADM

## 2021-04-06 RX ORDER — NITROGLYCERIN 0.4 MG/1
0.4 TABLET SUBLINGUAL EVERY 5 MIN PRN
Status: DISCONTINUED | OUTPATIENT
Start: 2021-04-06 | End: 2021-04-07 | Stop reason: HOSPADM

## 2021-04-06 RX ORDER — MONTELUKAST SODIUM 10 MG/1
10 TABLET ORAL NIGHTLY
Status: DISCONTINUED | OUTPATIENT
Start: 2021-04-06 | End: 2021-04-06 | Stop reason: SDUPTHER

## 2021-04-06 RX ORDER — ACETAMINOPHEN 325 MG/1
650 TABLET ORAL EVERY 4 HOURS PRN
Status: DISCONTINUED | OUTPATIENT
Start: 2021-04-06 | End: 2021-04-07 | Stop reason: HOSPADM

## 2021-04-06 RX ORDER — HALOPERIDOL 5 MG
5 TABLET ORAL DAILY
Status: DISCONTINUED | OUTPATIENT
Start: 2021-04-06 | End: 2021-04-07 | Stop reason: HOSPADM

## 2021-04-06 RX ORDER — CHOLECALCIFEROL (VITAMIN D3) 125 MCG
1000 CAPSULE ORAL DAILY
Status: DISCONTINUED | OUTPATIENT
Start: 2021-04-06 | End: 2021-04-07 | Stop reason: HOSPADM

## 2021-04-06 RX ORDER — NITROGLYCERIN 20 MG/100ML
5 INJECTION INTRAVENOUS CONTINUOUS
Status: DISCONTINUED | OUTPATIENT
Start: 2021-04-06 | End: 2021-04-07 | Stop reason: HOSPADM

## 2021-04-06 RX ORDER — HEPARIN SODIUM 1000 [USP'U]/ML
80 INJECTION, SOLUTION INTRAVENOUS; SUBCUTANEOUS PRN
Status: CANCELLED | OUTPATIENT
Start: 2021-04-06

## 2021-04-06 RX ORDER — HEPARIN SODIUM 10000 [USP'U]/100ML
5-30 INJECTION, SOLUTION INTRAVENOUS CONTINUOUS
Status: DISCONTINUED | OUTPATIENT
Start: 2021-04-06 | End: 2021-04-06

## 2021-04-06 RX ORDER — HEPARIN SODIUM 10000 [USP'U]/100ML
5-30 INJECTION, SOLUTION INTRAVENOUS CONTINUOUS
Status: CANCELLED | OUTPATIENT
Start: 2021-04-06

## 2021-04-06 RX ORDER — OXYBUTYNIN CHLORIDE 5 MG/1
5 TABLET, EXTENDED RELEASE ORAL NIGHTLY
Status: DISCONTINUED | OUTPATIENT
Start: 2021-04-06 | End: 2021-04-07 | Stop reason: HOSPADM

## 2021-04-06 RX ORDER — LEVOTHYROXINE SODIUM 0.03 MG/1
50 TABLET ORAL DAILY
Status: DISCONTINUED | OUTPATIENT
Start: 2021-04-06 | End: 2021-04-07 | Stop reason: HOSPADM

## 2021-04-06 RX ORDER — INSULIN GLARGINE 100 [IU]/ML
50 INJECTION, SOLUTION SUBCUTANEOUS NIGHTLY
Status: DISCONTINUED | OUTPATIENT
Start: 2021-04-06 | End: 2021-04-07 | Stop reason: HOSPADM

## 2021-04-06 RX ORDER — HEPARIN SODIUM 1000 [USP'U]/ML
4000 INJECTION, SOLUTION INTRAVENOUS; SUBCUTANEOUS ONCE
Status: COMPLETED | OUTPATIENT
Start: 2021-04-06 | End: 2021-04-06

## 2021-04-06 RX ORDER — SODIUM CHLORIDE, SODIUM LACTATE, POTASSIUM CHLORIDE, CALCIUM CHLORIDE 600; 310; 30; 20 MG/100ML; MG/100ML; MG/100ML; MG/100ML
INJECTION, SOLUTION INTRAVENOUS CONTINUOUS
Status: DISCONTINUED | OUTPATIENT
Start: 2021-04-06 | End: 2021-04-07

## 2021-04-06 RX ORDER — SODIUM CHLORIDE 0.9 % (FLUSH) 0.9 %
10 SYRINGE (ML) INJECTION EVERY 12 HOURS SCHEDULED
Status: DISCONTINUED | OUTPATIENT
Start: 2021-04-06 | End: 2021-04-07 | Stop reason: HOSPADM

## 2021-04-06 RX ORDER — ASPIRIN 81 MG/1
81 TABLET ORAL DAILY
Status: DISCONTINUED | OUTPATIENT
Start: 2021-04-06 | End: 2021-04-07 | Stop reason: HOSPADM

## 2021-04-06 RX ORDER — DOCUSATE SODIUM 100 MG/1
100 CAPSULE, LIQUID FILLED ORAL 2 TIMES DAILY
Status: DISCONTINUED | OUTPATIENT
Start: 2021-04-06 | End: 2021-04-07 | Stop reason: HOSPADM

## 2021-04-06 RX ORDER — MORPHINE SULFATE 2 MG/ML
4 INJECTION, SOLUTION INTRAMUSCULAR; INTRAVENOUS
Status: DISCONTINUED | OUTPATIENT
Start: 2021-04-06 | End: 2021-04-06

## 2021-04-06 RX ORDER — PRAZOSIN HYDROCHLORIDE 2 MG/1
2 CAPSULE ORAL NIGHTLY
Status: DISCONTINUED | OUTPATIENT
Start: 2021-04-06 | End: 2021-04-07 | Stop reason: HOSPADM

## 2021-04-06 RX ADMIN — SODIUM CHLORIDE: 9 INJECTION, SOLUTION INTRAVENOUS at 13:56

## 2021-04-06 RX ADMIN — GABAPENTIN 400 MG: 400 CAPSULE ORAL at 21:11

## 2021-04-06 RX ADMIN — HEPARIN SODIUM AND DEXTROSE 12 UNITS/KG/HR: 10000; 5 INJECTION INTRAVENOUS at 03:32

## 2021-04-06 RX ADMIN — GABAPENTIN 400 MG: 400 CAPSULE ORAL at 15:57

## 2021-04-06 RX ADMIN — TICAGRELOR 90 MG: 90 TABLET ORAL at 21:11

## 2021-04-06 RX ADMIN — PRAZOSIN HYDROCHLORIDE 2 MG: 2 CAPSULE ORAL at 21:11

## 2021-04-06 RX ADMIN — IPRATROPIUM BROMIDE AND ALBUTEROL SULFATE 1 AMPULE: .5; 3 SOLUTION RESPIRATORY (INHALATION) at 16:01

## 2021-04-06 RX ADMIN — FOLIC ACID 1 MG: 1 TABLET ORAL at 15:57

## 2021-04-06 RX ADMIN — SODIUM CHLORIDE, PRESERVATIVE FREE 10 ML: 5 INJECTION INTRAVENOUS at 21:21

## 2021-04-06 RX ADMIN — LEVOTHYROXINE SODIUM 50 MCG: 0.03 TABLET ORAL at 15:57

## 2021-04-06 RX ADMIN — TICAGRELOR 90 MG: 90 TABLET ORAL at 15:57

## 2021-04-06 RX ADMIN — IPRATROPIUM BROMIDE AND ALBUTEROL SULFATE 1 AMPULE: .5; 3 SOLUTION RESPIRATORY (INHALATION) at 20:19

## 2021-04-06 RX ADMIN — ASPIRIN 325 MG: 325 TABLET, FILM COATED ORAL at 02:34

## 2021-04-06 RX ADMIN — SODIUM CHLORIDE, PRESERVATIVE FREE 10 ML: 5 INJECTION INTRAVENOUS at 21:22

## 2021-04-06 RX ADMIN — ONDANSETRON 4 MG: 2 INJECTION INTRAMUSCULAR; INTRAVENOUS at 02:34

## 2021-04-06 RX ADMIN — HYDRALAZINE HYDROCHLORIDE 50 MG: 50 TABLET, FILM COATED ORAL at 16:06

## 2021-04-06 RX ADMIN — INSULIN GLARGINE 50 UNITS: 100 INJECTION, SOLUTION SUBCUTANEOUS at 21:15

## 2021-04-06 RX ADMIN — MONTELUKAST 10 MG: 10 TABLET, FILM COATED ORAL at 21:11

## 2021-04-06 RX ADMIN — HEPARIN SODIUM 4000 UNITS: 1000 INJECTION INTRAVENOUS; SUBCUTANEOUS at 03:38

## 2021-04-06 RX ADMIN — METOPROLOL SUCCINATE 50 MG: 50 TABLET, EXTENDED RELEASE ORAL at 21:10

## 2021-04-06 RX ADMIN — DOCUSATE SODIUM 100 MG: 100 CAPSULE, LIQUID FILLED ORAL at 21:11

## 2021-04-06 RX ADMIN — HALOPERIDOL 5 MG: 5 TABLET ORAL at 16:06

## 2021-04-06 RX ADMIN — ATORVASTATIN CALCIUM 40 MG: 40 TABLET, FILM COATED ORAL at 21:11

## 2021-04-06 RX ADMIN — IOPAMIDOL 65 ML: 612 INJECTION, SOLUTION INTRAVENOUS at 12:47

## 2021-04-06 RX ADMIN — CYANOCOBALAMIN TAB 500 MCG 1000 MCG: 500 TAB at 15:57

## 2021-04-06 RX ADMIN — SERTRALINE HYDROCHLORIDE 200 MG: 50 TABLET ORAL at 15:57

## 2021-04-06 RX ADMIN — HYDRALAZINE HYDROCHLORIDE 50 MG: 50 TABLET, FILM COATED ORAL at 21:11

## 2021-04-06 RX ADMIN — SODIUM CHLORIDE: 9 INJECTION, SOLUTION INTRAVENOUS at 10:19

## 2021-04-06 RX ADMIN — MECLIZINE HYDROCHLORIDE 25 MG: 25 TABLET ORAL at 16:06

## 2021-04-06 RX ADMIN — MORPHINE SULFATE 4 MG: 2 INJECTION, SOLUTION INTRAMUSCULAR; INTRAVENOUS at 02:34

## 2021-04-06 RX ADMIN — OXYBUTYNIN CHLORIDE 5 MG: 5 TABLET, EXTENDED RELEASE ORAL at 21:11

## 2021-04-06 RX ADMIN — NITROGLYCERIN 5 MCG/MIN: 20 INJECTION INTRAVENOUS at 04:40

## 2021-04-06 RX ADMIN — MECLIZINE HYDROCHLORIDE 25 MG: 25 TABLET ORAL at 20:11

## 2021-04-06 ASSESSMENT — ENCOUNTER SYMPTOMS
WHEEZING: 0
PHOTOPHOBIA: 0
ABDOMINAL DISTENTION: 0
EYE DISCHARGE: 0
ABDOMINAL PAIN: 0
SHORTNESS OF BREATH: 0
VOMITING: 0
NAUSEA: 0
CHEST TIGHTNESS: 1
STRIDOR: 0
SHORTNESS OF BREATH: 1
COUGH: 0

## 2021-04-06 ASSESSMENT — PAIN SCALES - GENERAL
PAINLEVEL_OUTOF10: 10
PAINLEVEL_OUTOF10: 0
PAINLEVEL_OUTOF10: 10

## 2021-04-06 ASSESSMENT — PAIN DESCRIPTION - FREQUENCY: FREQUENCY: CONTINUOUS

## 2021-04-06 ASSESSMENT — PAIN DESCRIPTION - LOCATION: LOCATION: CHEST

## 2021-04-06 ASSESSMENT — PAIN DESCRIPTION - ONSET: ONSET: ON-GOING

## 2021-04-06 ASSESSMENT — PAIN DESCRIPTION - PROGRESSION: CLINICAL_PROGRESSION: NOT CHANGED

## 2021-04-06 ASSESSMENT — PAIN DESCRIPTION - DESCRIPTORS: DESCRIPTORS: ACHING;PRESSURE

## 2021-04-06 NOTE — PROGRESS NOTES
PHARMACY NOTE:   ICU Rounds Attended (10-15 minutes in patient room):    Pt diagnosis: NSTEMI    IV Fluids: 1L NS bolus    Renal:   Recent Labs     04/06/21  0200   CREATININE 1.30*    Estimated Creatinine Clearance: 53 mL/min (A) (based on SCr of 1.3 mg/dL (H)).         Antimicrobial Therapy: patient is not on antimicrobial therapy at this time     Recent Labs     04/06/21  0200 04/06/21  0821   WBC 10.5 10.9*           Pressors: none   Sedation: none   Drips: nitroglycerin @ 5mcg/min     Insulin Therapy (goal: 140-180): low dose sliding scale coverage with Humalog   0 units given past 24 hours    Recent Labs     04/06/21  0200   GLUCOSE 223*       Steroid Therapy: none at this time   Stress Ulcer Prophylaxis:   None   On at home: no    DVT Prophylaxis/Anticoagulant Therapy: heparin drip   Recent Labs     04/06/21  0200 04/06/21  0821    238     Recent Labs     04/06/21  0200   INR 1.0         Bowel Regimen:   None at this time     IV to PO: none at this time    Diet (NPO, tube feeds, TPN): NPO    Oxygen Therapy: room air      Follow up/Changes:   No medication changes made by pharmacy today during interdisciplinary care rounds      Jesus Manuel Kaplan, PharmD   4/6/2021 11:05 AM

## 2021-04-06 NOTE — BRIEF OP NOTE
Clinical indication unstable angina pectoris  Patient with history of diabetes coronary artery bypass grafting prior PCI stenting    Under local anesthesia and IV conscious sedation, patient underwent left heart catheterization selective coronary angiography selective LIMA angiography left ventriculography and PCI of the lesion at the ostium of the left anterior descending artery was attempted, but unsuccessful. Procedure had to be aborted because balloons would not cross. This is a chronic lesion, hence unlikely to be a cause of acute coronary syndrome. LV EF is about 50% LVEDP varies with breathing, 15 to 25 mmHg. Patient was hypertensive. Angio-Seal hemostatic device was used. There were no immediate complications. The dose of IV nitroglycerin was increased for blood pressure management and preload reduction. Further recommendations per Dr. Gonzalez Been. Dr. Gonzalez Been updated.   Full note to follow

## 2021-04-06 NOTE — ED NOTES
Pt placed on bedpan, she is refusing to have her head raised at all. She is currently laying flat, with bedpan and mothers pad under her.  at bedside, call light within reach and vitals set to run q15min. Will continue to monitor.        Charito Metzger RN  04/06/21 2732

## 2021-04-06 NOTE — CONSULTS
ostial left circumflex stenting 2020. She has been on Brilinta. She states that she has been taking her medications. Her symptoms are always somewhat difficult to sort out especially given her language barrier. She was admitted for further evaluation of her chest pain symptoms and cardiology was consulted. Of note interventional cardiology was also called and agreed with admission and cardiac catheterization to exclude progressive coronary artery disease. D-dimer was negative. Patient History and Records, EMR reviewed. Patient Interviewed and examined. Poor historian, language barrier, speaks Lithuanian predominantly. Denies LH, Dizziness, TIA or CVA Symptoms. No Orthopnea, Edema or CHF symptoms. No Palpitations. No Syncope. No Fever, Chills or Cold symptoms. No GI,  or Bleeding complaints. Cardiac and general ROS otherwise negative. [address] otherwise negative other than noted. Past Medical History:   Diagnosis Date    Asthma     CAD (coronary artery disease)     CKD (chronic kidney disease) stage 3, GFR 30-59 ml/min     Colitis     Diabetes mellitus ([facility] [address] [facility]     Hyperlipidemia     Hypertension     [facility]peripheral [facility]     Prolonged emergence from general anesthesia     PVD (peripheral vascular disease) ([facility] [address] [facility]     Thyroid disease        Past Surgical History:   Procedure Laterality Date    CARDIAC SURGERY      CATARACT REMOVAL WITH IMPLANT Bilateral 11- 11-     SECTION      COLONOSCOPY N/A 2019    COLONOSCOPY DIAGNOSTIC performed by Eliceo Hebert MD at [address] [place] [address] GRAFT  2019    unknown vessels    HYSTERECTOMY      TOE AMPUTATION Right     3rd toe       Prior to Admission medications    Medication Sig Start Date End Date Taking?  Authorizing Provider   albuterol (PROVENTIL) (2.5 MG/3ML) 0.083% nebulizer solution Take 3 mLs by nebulization every 6 hours as needed for Wheezing 3/25/21 4/24/21  Lidia Richardson MD   albuterol sulfate HFA (VENTOLIN HFA) 108 (90 Base) MCG/ACT inhaler Inhale 2 puffs into the lungs as needed for Wheezing Every 4-6 hours PRN 3/25/21   Lidia Richardson MD   montelukast (SINGULAIR) 10 MG tablet Take 1 tablet by mouth nightly 3/25/21   Lidia Richardson MD   levothyroxine (SYNTHROID) 50 MCG tablet take 1 tablet by mouth once daily 3/24/21   Nidia Davis MD   Continuous Blood Gluc Sensor (FREESTYLE FELY 14 DAY SENSOR) MISC Every 2 weeks 3/24/21   Nidia Davis MD   Alcohol Swabs (ALCOHOL PREP) 70 % PADS qid 3/24/21   Nidia Davis MD   Insulin Pen Needle (NOVOFINE) 32G X 6 MM MISC qid 3/24/21   Nidia Davis MD   insulin glargine (LANTUS SOLOSTAR) 100 UNIT/ML injection pen 50 units at bedtime 3/23/21   Nidia Davis MD   Dulaglutide (TRULICITY) 6.04 NL/8.6TW SOPN Inject 0.75 mg into the skin once a week 3/23/21   Nidia Davis MD   insulin lispro, 1 Unit Dial, (HUMALOG KWIKPEN) 100 UNIT/ML SOPN 15  units at each meals 3/23/21   Nidia Davis MD   oxybutynin (DITROPAN-XL) 5 MG extended release tablet take 1 tablet by mouth once daily 3/19/21   Naa Waite MD   atorvastatin (LIPITOR) 40 MG tablet Take 1 tablet by mouth nightly 3/10/21   Charlenedred Innocent, APRN - CNP   RESTASIS 0.05 % ophthalmic emulsion  3/8/21   Historical Provider, MD   ibuprofen (ADVIL;MOTRIN) 800 MG tablet take 1 tablet by mouth every 8 hours AS NEEDED FOR PAIN with food or milk 2/23/21   Historical Provider, MD   neomycin-polymyxin-dexameth (MAXITROL) 3.5-11935-8.1 ophthalmic suspension instill 1 drop into both eyes four times a day 1/5/21   Historical Provider, MD   ARTIFICIAL TEARS 1.4 % ophthalmic solution  3/8/21   Historical Provider, MD   docusate sodium (COLACE, DULCOLAX) 100 MG CAPS Take 100 mg by mouth 2 times daily 10/3/20   Brenna Phillips MD   prazosin (MINIPRESS) 2 MG capsule Take 2 mg by mouth nightly  9/14/20   Historical Provider, MD   sertraline (ZOLOFT) 100 MG tablet Take 200 mg by mouth daily  9/14/20   Historical Provider, MD   Continuous Blood Gluc Sensor (FREESTYLE FELY 14 DAY SENSOR) MISC Every 2 weeks 9/21/20   Ld Lee MD   Continuous Blood Gluc  (FREESTYLE FELY 14 DAY READER) CATHLEEN As directed 9/21/20   Ld Lee MD   furosemide (LASIX) 40 MG tablet Take 1 tablet by mouth daily 9/4/20   Alisa Navarro MD   ticagrelor (BRILINTA) 90 MG TABS tablet Take 90 mg by mouth 2 times daily    Historical Provider, MD   CPAP Machine MISC by Does not apply route New CPAP with 10 cm 8/20/20   Toney Sheldon MD   aspirin 81 MG EC tablet Take 1 tablet by mouth daily 6/30/20   Charles Guillen MD   OXYGEN 2 lit O2 with sleep , please give O2 concentrator 6/12/20   Toney Sheldon MD   Emollient (EUCERIN INTENSIVE REPAIR HAND) 2.5-10 % CREA APPLY TO FEET AT BEDTIME; PLACE SOCKS OVER FEET AFTER APPLICATION IF NEEDED FOR DRY CRACKING FEET 3/15/20   Historical Provider, MD   hydrOXYzine (VISTARIL) 25 MG capsule Take 50 mg by mouth 3 times daily as needed  3/4/20   Historical Provider, MD   Nutritional Supplements ([address]) LIQD take as directed three times a day 6/1/20   Historical Provider, MD   insulin glargine (LANTUS) 100 UNIT/ML injection vial Inject 30 Units into the skin nightly  Patient taking differently: Inject 35 Units into the skin nightly  5/12/20   Ryan He MD   isosorbide dinitrate (ISORDIL) 20 MG tablet Take 1 tablet by mouth 3 times daily 4/28/20   Charles Guillen MD   nitroGLYCERIN (NITROSTAT) 0.4 MG SL tablet up to max of 3 total doses.  If no relief after 1 dose, call 911. 4/28/20   Charles Guillen MD   hydrALAZINE (APRESOLINE) 50 MG tablet Take 1 tablet by mouth every 8 hours 4/28/20   Charles Guillen MD   metoprolol succinate (TOPROL XL) 50 MG extended release tablet Take 1 tablet by mouth 2 times daily 4/28/20   Charles Guillen MD   amitriptyline (ELAVIL) 25 MG tablet Take 25 mg by mouth nightly    Historical Provider, MD gabapentin (NEURONTIN) 400 MG capsule Take 400 mg by mouth 2 times daily. AM and 800 mg in pm-9pm    Historical Provider, MD   haloperidol (HALDOL) 5 MG tablet Take 5 mg by mouth daily    Historical Provider, MD   FreeStyle Lancets MISC 1 each by Does not apply route daily 1/30/20   Lucas Guerra MD   blood glucose test strips (FREESTYLE LITE) strip 1 each by In Vitro route daily As needed.  1/30/20   Lucas Guerra MD   folic acid (FOLVITE) 1 MG tablet Take 1 mg by mouth daily    Historical Provider, MD   Iron Polysacch Smjac-O52-LR (NIFEREX-150 FORTE PO) Take 1 tablet by mouth daily     Historical Provider, MD   Cyanocobalamin (VITAMIN B-12) 1000 MCG extended release tablet Take 1,000 mcg by mouth daily    Historical Provider, MD   meclizine (ANTIVERT) 25 MG tablet Take 25 mg by mouth three times daily    Historical Provider, MD       Scheduled Meds:   sodium chloride  1,000 mL Intravenous Once    insulin lispro  0-6 Units Subcutaneous TID WC    insulin lispro  0-3 Units Subcutaneous Nightly    aspirin  81 mg Oral Daily    sodium chloride flush  10 mL Intravenous 2 times per day     Continuous Infusions:   heparin (PORCINE) Infusion 10 Units/kg/hr (04/06/21 0906)    nitroGLYCERIN 5 mcg/min (04/06/21 0440)    lactated ringers      sodium chloride       PRN Meds:morphine, sodium chloride flush    Allergies   Allergen Reactions    Ambien [Zolpidem Tartrate]     Capoten [Captopril]     Clioquinol     Cogentin [Benztropine]     Depakote [Divalproex Sodium]     Effexor Xr [Venlafaxine Hcl Er]     Geodon [Ziprasidone Hcl]     Lisinopril      Hyperkalemia: 4/21/20 potassium was 6.7    Lyrica [Pregabalin]     Navane [Thiothixene]     Pamelor [Nortriptyline Hcl]     Remeron [Mirtazapine]     Risperdal [Risperidone]     Trazodone And Nefazodone     Wellbutrin [Bupropion]        Social History     Socioeconomic History    Marital status:      Spouse name: Not on file    Number of children: Not on a walker, but obtained a hospital bed, wheel chair, BSC and O2 last admission (from [address]). pharmacy is [address] [place] [address],[address] [address]., [place]. Transportation is provided by [employer] () per the patient       History reviewed. No pertinent family history. Review Of Systems:    14 point ROS negative other than mentioned. Physical Exam:    CURRENT VITALS: BP (!) 134/58   Pulse 60   Temp 98 °F (36.7 °C) (Oral)   Resp 19   Ht 5' 4\" (1.626 m)   Wt 240 lb (108.9 kg)   SpO2 97%   BMI 41.20 kg/m²     CONSTITUTIONAL:  awake, alert, cooperative, no apparent distress, anxious. ENT:  Normocephalic, without obvious abnormality, atraumatic, sinuses nontender on palpation, external ears without lesions,  NECK:  Supple, symmetrical, trachea midline, no adenopathy, thyroid symmetric, not enlarged and no tenderness, skin normal, No bruits. LUNGS:  No increased work of breathing, good air exchange, clear to auscultation bilaterally, no crackles, no wheezing  CHEST: Midline scar well-healed. Reproducible left ijeoma-breast discomfort with palpation. CARDIOVASCULAR:  Normal apical impulse, regular rate and rhythm, normal S1 and S2,  1/6 Systolic murmur noted. ABDOMEN:  Obese, normal bowel sounds, soft, non-distended, non-tender, no masses palpated, no hepatosplenomegally  EXTREMETIES: No edema, Pulses Strong Thruout. No ulcers. NEUROLOGIC:  Awake, alert, oriented to name, place and time. Following all commands and moving all extremties.   SKIN:  no bruising or bleeding, normal skin color, texture, turgor and no rashes       Labs:  Recent Results (from the past 24 hour(s))   EKG 12 Lead    Collection Time: 04/06/21  1:36 AM   Result Value Ref Range    Ventricular Rate 70 BPM    Atrial Rate 70 BPM    P-R Interval 212 ms    QRS Duration 98 ms    Q-T Interval 398 ms    QTc Calculation (Bazett) 429 ms    P Axis 45 degrees    R Axis 83 degrees    T Axis 254 degrees   Brain Natriuretic Peptide    Collection Time: 04/06/21  2:00 AM   Result Value Ref Range    Pro-BNP 5,970 pg/mL   APTT    Collection Time: 04/06/21  2:00 AM   Result Value Ref Range    aPTT 26.7 24.4 - 36.8 sec   Protime-INR    Collection Time: 04/06/21  2:00 AM   Result Value Ref Range    Protime 13.6 12.3 - 14.9 sec    INR 1.0    Comprehensive Metabolic Panel    Collection Time: 04/06/21  2:00 AM   Result Value Ref Range    Sodium 135 135 - 144 mEq/L    Potassium 4.5 3.4 - 4.9 mEq/L    Chloride 95 95 - 107 mEq/L    CO2 27 20 - 31 mEq/L    Anion Gap 13 9 - 15 mEq/L    Glucose 223 (H) 70 - 99 mg/dL    BUN 52 (H) 8 - 23 mg/dL    CREATININE 1.30 (H) 0.50 - 0.90 mg/dL    GFR Non-African American 41.3 (L) >60    GFR  50.0 (L) >60    Calcium 8.9 8.5 - 9.9 mg/dL    Total Protein 6.9 6.3 - 8.0 g/dL    Albumin 3.6 3.5 - 4.6 g/dL    Total Bilirubin 0.3 0.2 - 0.7 mg/dL    Alkaline Phosphatase 87 40 - 130 U/L    ALT 16 0 - 33 U/L    AST 20 0 - 35 U/L    Globulin 3.3 2.3 - 3.5 g/dL   CBC Auto Differential    Collection Time: 04/06/21  2:00 AM   Result Value Ref Range    WBC 10.5 4.8 - 10.8 K/uL    RBC 4.72 4.20 - 5.40 M/uL    Hemoglobin 11.7 (L) 12.0 - 16.0 g/dL    Hematocrit 35.2 (L) 37.0 - 47.0 %    MCV 74.6 (L) 82.0 - 100.0 fL    MCH 24.8 (L) 27.0 - 31.3 pg    MCHC 33.2 33.0 - 37.0 %    RDW 18.8 (H) 11.5 - 14.5 %    Platelets 774 674 - 586 K/uL    Neutrophils % 63.0 %    Lymphocytes % 25.5 %    Monocytes % 8.8 %    Eosinophils % 2.3 %    Basophils % 0.4 %    Neutrophils Absolute 6.6 (H) 1.4 - 6.5 K/uL    Lymphocytes Absolute 2.7 1.0 - 4.8 K/uL    Monocytes Absolute 0.9 (H) 0.2 - 0.8 K/uL    Eosinophils Absolute 0.2 0.0 - 0.7 K/uL    Basophils Absolute 0.0 0.0 - 0.2 K/uL    Anisocytosis 1+     Microcytes 1+     Hypochromia 1+     Ovalocytes 1+    Magnesium    Collection Time: 04/06/21  2:00 AM   Result Value Ref Range    Magnesium 2.2 1.7 - 2.4 mg/dL   Troponin    Collection Time: 04/06/21  2:00 AM   Result Value Ref Range Troponin 0.310 (HH) 0.000 - 0.010 ng/mL   Urine Reflex to Culture    Collection Time: 04/06/21  2:00 AM    Specimen: Urine, clean catch   Result Value Ref Range    Color, UA Yellow Straw/Yellow    Clarity, UA Clear Clear    Glucose, Ur Negative Negative mg/dL    Bilirubin Urine Negative Negative    Ketones, Urine Negative Negative mg/dL    Specific Gravity, UA 1.015 1.005 - 1.030    Blood, Urine Negative Negative    pH, UA 5.5 5.0 - 9.0    Protein, UA Negative Negative mg/dL    Urobilinogen, Urine 0.2 <2.0 E.U./dL    Nitrite, Urine Negative Negative    Leukocyte Esterase, Urine Negative Negative    Urine Reflex to Culture Not Indicated    CK    Collection Time: 04/06/21  2:00 AM   Result Value Ref Range    Total  0 - 170 U/L   TSH without Reflex    Collection Time: 04/06/21  2:00 AM   Result Value Ref Range    TSH 1.390 0.440 - 3.860 uIU/mL   D-Dimer, Quantitative    Collection Time: 04/06/21  2:00 AM   Result Value Ref Range    D-Dimer, Quant 0.42 0.00 - 0.50 mg/L U   COVID-19, Rapid    Collection Time: 04/06/21  2:51 AM    Specimen: Nasopharyngeal Swab   Result Value Ref Range    SARS-CoV-2, NAAT Not Detected Not Detected   Troponin    Collection Time: 04/06/21  4:00 AM   Result Value Ref Range    Troponin 0.320 (HH) 0.000 - 0.010 ng/mL   CBC    Collection Time: 04/06/21  8:21 AM   Result Value Ref Range    WBC 10.9 (H) 4.8 - 10.8 K/uL    RBC 4.70 4.20 - 5.40 M/uL    Hemoglobin 11.6 (L) 12.0 - 16.0 g/dL    Hematocrit 34.8 (L) 37.0 - 47.0 %    MCV 74.1 (L) 82.0 - 100.0 fL    MCH 24.6 (L) 27.0 - 31.3 pg    MCHC 33.2 33.0 - 37.0 %    RDW 19.1 (H) 11.5 - 14.5 %    Platelets 819 326 - 722 K/uL   HEPARIN LEVEL/ANTI-XA    Collection Time: 04/06/21  8:21 AM   Result Value Ref Range    Anti-XA Unfrac Heparin 0.97 IU/mL   Troponin    Collection Time: 04/06/21  8:21 AM   Result Value Ref Range    Troponin 0.335 () 0.000 - 0.010 ng/mL       ECG:     Sinus rhythm with 1st degree AV block  Anteroseptal infarct , age undetermined / PRWP  NSST & T wave changes. ECHO:    Pending        Kemi Deluna MD  [facility] Cardiologist      Electronically signed on 4/6/21 at 9:51 AM EDT      -----    Subjective  PCP: General acute hospital and Dentistry   Subjective:   02/24/2021 - Negin Greco was seen in cardiac evaluation at the [facility] office 02/24/2021. The patients problems are listed in the impression below. Electronic medical records reviewed. Patient returns. She had 1 tooth extracted without issues recently. She feels well overall. She has had no further syncope. She denies any chest pain. She is active overall without limitations. Patient denies Chest Pain, SOB, Lightheadedness, Dizziness, TIA or CVA symptoms. No CHF or Edema. No Palpitations. No GI,  or Bleeding Issues. No Recent Fever or Chills. Cardiovascular and general review of systems is otherwise negative. A 14-system review is otherwise negative, other than noted. ELECTROCARDIOGRAM: Sinus rhythm, poor R wave anterior progression. Nonspecific ST-T wave changes. Cannot exclude prior microinfarction no acute changes. Rate 60. CARDIAC TESTING: None    LABORATORY DATA: Chem-7, CBC, liver function test, magnesium, TSH normal except for glucose 186, creatinine 1.16, GFR 47. Cholesterol 127, triglyceride 129, HDL 37, LDL 64. Hs-CRP 13.9. Otherwise as noted below. All above testing was personally reviewed. PHYSICAL EXAMINATION:   General: No acute distress. Vital signs as noted. Alert and oriented. Head And Neck Examination: No jugular venous distention, no carotid bruits, no mass. Carotid upstrokes preserved. Oral mucosa moist. No xanthelasma. Head and neck examination otherwise unremarkable. Lungs: Clear to auscultation and percussion. No wheezes, no rales, and no rhonchi. Chest: Excursion appeared to be normal. No chest wall tenderness on palpation. Lateral incisional scar.   Heart: Normal S1 and S2. No S3. No S4. No rub. Grade 2/6 systolic murmur, best heard at the left sternal border. Point of maximal impulse was within normal limits. Abdomen: Soft. Nontender. No organomegaly. No bruits. No masses. Obese. Extremities: No bipedal edema. No clubbing. No cyanosis. Pulses are diminished throughout. No bruits. Right lower extremity foot/toe infection. Musculoskeletal Exam: No ulcers, otherwise unremarkable. Neuro: Neurologically appeared grossly intact. IMPRESSION:     Cardiovascular status stable  Chest pain, reproducible, musculoskeletal, noncardiac, resolved. CAD post CABG June 2019 [facility] AT [facility] acute hospital, [place] to [place], PCI to left circumflex and right coronary artery, last April 2020. Ischemic cardiomyopathy with apical hypokinesia LVEF 50%. PVOD post right lower extremity revascularization,  and [facility] Dr Sheryle Lehmann, (details not available), Right third toe amputation site infection. Carotid artery disease with ultrasound noted 50-69% bilateral stenoses May 2019. Syncope, presyncope  History of heart failure  Hypothyroidism  Hypertension  Hyperlipidemia  Diabetes  Depression  Schizophrenia  Family history of coronary artery disease  Multiple allergies as noted  Otherwise as per assessment below. RECOMMENDATIONS:     Patient appears to be doing well at this time. Would suggest that she continue her current medications and treatment. Medication refills were provided. Exercise dietary weight reduction program was encouraged. Hydration. Follow my health portal was encouraged. She will follow-up with [facility] and [address] [place] [address] and dentistry for her general care and psychiatric care. We will plan to see back in 2 months with Laboratory Studies and ECG as noted below. Patient will follow up with their primary physician for general care. The patient knows to contact medical care earlier if need be.       Darin Fox MD, [facility] / [facility] Cardiology      Of Note:  Kate [id number]; Last Rx:[id number] Ordered   4. Atorvastatin Calcium 40 MG Oral Tablet; take 2 tablets once daily  Requested for:   02Jun2020; Last Rx:01Jun2020 Ordered   5. Brilinta 90 MG Oral Tablet; TAKE 1 TABLET TWICE DAILY; Therapy: 28Apr2020 to ([facility])  Requested for: [id number]; Last   Rx:02Jun2020 Ordered   6. Furosemide 40 MG Oral Tablet; Take one tab daily; Therapy: [id number] to ([facility])  Requested for: [id number]; Last   Rx:01Dec2020 Ordered   7. Haloperidol 5 MG Oral Tablet; TAKE 1 TABLET TWICE DAILY; Therapy: (Edenilson Morocho) to Recorded   8. HumaLOG 100 UNIT/ML Subcutaneous Solution Cartridge; INJECT SUBCUTANEOUSLY   AS DIRECTED; Therapy: (Recorded:22Jan2021) to Recorded   9. hydrALAZINE HCl - 50 MG Oral Tablet; TAKE 1 TABLET 3 TIMES DAILY; Therapy: 28Apr2020 to ([facility])  Requested for: [id number]; Last   Rx:01Jun2020 Ordered   10. HYDROcodone-Acetaminophen 5-325 MG Oral Tablet; TAKE 1 TABLET EVERY 4 TO 6    HOURS AS NEEDED FOR PAIN;    Therapy: 21Jan2021 to Recorded   11. hydrOXYzine HCl - 25 MG Oral Tablet; TAKE 1 TABLET 3 TIMES DAILY AS NEEDED; Therapy: (Edenilson Morocho) to Recorded   12. Ibuprofen 800 MG Oral Tablet; TAKE 1 TABLET EVERY 8 HOURS AS NEEDED; Therapy: [id number] to Recorded   13. Isosorbide Dinitrate 20 MG Oral Tablet; TAKE 1 TABLET BY MOUTH EVERY 8 HOURS; Therapy: 28Apr2020 to (Evaluate:[id number])  Requested for: [id number]; Last    Rx:[id number] Ordered   14. Lantus 100 UNIT/ML Subcutaneous Solution; INJECT SUBCUTANEOUSLY AS    DIRECTED; Therapy: (Edenilson Morocho) to Recorded   15. Levothyroxine Sodium 50 MCG Oral Tablet; 1 TABLET DAILY; Therapy: 10Apr2019 to Recorded   16. Meclizine HCl - 25 MG Oral Tablet; TAKE 1 TABLET 3 TIMES DAILY AS NEEDED; Therapy: (Edenilson Morocho) to Recorded   17. Metoprolol Succinate ER 50 MG Oral Tablet Extended Release 24 Hour; TAKE 1 TABLET    TWICE A DAY  Requested for: [id number];  Last Rx: [id number] Ordered   18. Montelukast Sodium 10 MG Oral Tablet; TAKE 1 TABLET AT BEDTIME; Therapy: (Reyes Banda) to Recorded   19. Nitroglycerin 0.4 MG Sublingual Tablet Sublingual; PLACE 1 TABLET UNDER THE    TONGUE EVERY 5 MINUTES FOR UP TO 3 DOSES AS NEEDED FOR CHEST    PAIN. CALL 911 IF PAIN PERSISTS  Requested for: 40Vqi0735; Last Rx:[id number]    Ordered   20. Penicillin V Potassium 500 MG Oral Tablet; TAKE 1 TABLET 4 TIMES DAILY UNTIL GONE;    Therapy: [id number] to Recorded   21. Prazosin HCl - 2 MG Oral Capsule; TAKE 1 CAPSULE EVERY 12 HOURS DAILY; Therapy: (Reyes Banda) to Recorded   22. Sertraline HCl - 100 MG Oral Tablet; TAKE 2 TABLETS DAILY; Therapy: (Reyes Banda) to Recorded    did not bring a list nor bottles from home, unable to completely verify medications . LUCA Avendaño LPN     Allergies   1. Ambien TABS   nervousness; Insomnia; Recorded By: Gilmar Velásquez; 04/17/2019 3:02:20 PM   2. Capoten TABS   Cough; Palpitations; Recorded By: Gilmar Velásquez; 04/17/2019 3:02:20 PM   3. Cogentin   Palpitations; Recorded By: Gilmar Velásquez; 04/17/2019 3:02:20 PM   4. Geodon CAPS   vision problems; Sleeplessness; Recorded By: Gilmar Velásquez; 04/17/2019 3:02:20 PM   5. KlonoPIN TABS   Palpitations; Recorded By: Gilmar Velásquez; 04/17/2019 3:02:20 PM   6. Navane CAPS   Headache; Recorded By: Gilmar Velásquez; 04/17/2019 3:02:20 PM   7. Remeron   Nausea; Vomiting; Recorded By: Gilmar Velásquez; 04/17/2019 3:02:20 PM   8. RisperDAL TABS   Nausea; Recorded By: Gilmra Velásquez; 04/17/2019 3:02:20 PM   9. SEROquel TABS   Palpitations; Recorded By: Gilmar Velásquez; 04/17/2019 3:02:20 PM   10. Abilify   Recorded By: Kaykay Boudreaux; 11/08/2019 11:02:43 AM   11. Depakote ER TB24   unsteady, falls; Recorded By: Gilmar Velásquez; 04/17/2019 3:02:20 PM   12. Effexor TABS   patient states she felt high on medication;  Recorded By: Gilmar Velásquez; 04/17/2019 3:02:20 PM    Immunizations  FLU --- Mohinder Pontiff: [id number] Aliment: 01-Oct-2020   PCV --- Series1: 01-Oct-2019     Vitals  Vital Signs   Recorded: 17Cqb6030 02:46PM   Height: 5 ft 1 in  Weight: 243 lb   BMI Calculated: 45.91 kg/m2  BSA Calculated: 2.05  Heart Rate: 68, Apical  Blood Pressure: 122 / 56, LUE, Sitting  Recorded: 77Xav8724 02:44PM   Height: 5 ft 1 in  Weight: 243 lb   BMI Calculated: 45.91 kg/m2  BSA Calculated: 2.05  Falls Screening: Two or more falls within the past year    Procedure    EKG done in office today; :   .     Assessment   1. CAD in native artery (414.01) (I25.10)   2. PVD (peripheral vascular disease) (443.9) (I73.9)   3. Abnormal EKG (794.31) (R94.31)   4. HTN (hypertension) (401.9) (I10)   5. Hyperlipidemia (272.4) (E78.5)   6. Hypothyroidism (244.9) (E03.9)   7. Ischemic cardiomyopathy (414.8) (I25.5)   8. Mitral regurgitation (424.0) (I34.0)   9. Diabetes (250.00) (E11.9)   10. CKD (chronic kidney disease), stage III (585.3) (N18.30)   11. Carotid atherosclerosis (433.10) (I65.29)   12. Depression (311) (F32.9)   13. Schizo-affective psychosis (295.70) (F25.9)   14. Morbid obesity (278.01) (E66.01)   15. Never a smoker    Plan   1. Start: Atorvastatin Calcium 80 MG Oral Tablet; TAKE 1 TABLET AT BEDTIME   2. Renew: Aspirin 81 MG Oral Tablet Delayed Release; TAKE 1 TABLET DAILY   3. Renew: Brilinta 90 MG Oral Tablet; TAKE 1 TABLET TWICE DAILY   4. Renew: Furosemide 40 MG Oral Tablet; Take one tab daily   5. Renew: hydrALAZINE HCl - 50 MG Oral Tablet; TAKE 1 TABLET 3 TIMES DAILY   6. Renew: Isosorbide Dinitrate 20 MG Oral Tablet; TAKE 1 TABLET BY MOUTH EVERY 8   HOURS   7. Renew: Metoprolol Succinate ER 50 MG Oral Tablet Extended Release 24 Hour; TAKE 1   TABLET TWICE A DAY   8. Renew: Nitroglycerin 0.4 MG Sublingual Tablet Sublingual; PLACE 1 TABLET UNDER   THE TONGUE EVERY 5 MINUTES FOR UP TO 3 DOSES AS NEEDED FOR   CHEST PAIN. CALL 911 IF PAIN PERSISTS   9. 12 Lead EKG; Status:Hold For - Exact Date;  Requested for:[id number] EKG DONE IN   OFFICE TODAY; 10. EKG at next office visit.; Status:Active; Requested for:[id number];    11. Lifestyle education regarding diet; Status:Complete;   Done: [id number]   12. Tobacco Use Screening; Status:Complete;   Done: [id number]   13. 2 month follow-up/call if interval problems. Evaluation and Treatment  Follow-up  Status:    Active  Requested for: 84Hyh1365   14. Follow up per family physician. Evaluation and Treatment  Follow-up  Status: Active     Requested for: [id number]   15. Access your medical record, request prescriptions, view laboratory and testing done in    our office, request appointments, view immunization records and message your provider    through our safe and secure patient portal. Ask a staff member how    to register or visit us at [email or web address] to get started today.;    Status:Complete;   Done: [id number]   16. Drink plenty of fluids.; Status:Active; Requested for:[id number];    17. Please bring all medicines, vitamins, and herbal supplements with you when you come    to the office.; Status:Active; Requested for:[id number];    18. Please bring any lab results from other providers/physicians to your next appointment.;    Status:Active; Requested for:[id number];    19. Prescriptions will not be filled unless you are compliant with your follow up appointments    or have a follow up appointments scheduled as per the instruction of your physician. Refills for medications should be requested at the time of your office visit.; Status:Active; Requested for:[id number];    20. Thank you for being a patient at [facility] [place]. Our goal is to    provide high quality medical care. Following today's visit, please check your email for an    invitation to complete our patient satisfaction survey. By sharing your feedback, you will    help us continue our mission to provide excellent medical care.  Your participation in the    survey is voluntary and completely confidential. We appreciate your thoughts regarding    today's visit.; Status:Active; Requested for:[id number];    21. There are many exercise options for seniors.; Status:Active; Requested for:[id number];    22. Tobacco use Hx Reported; Status:Complete;   Done: [id number]   23. You are overweight. Please increase activity level and reduce calorie intake. Please    inform the staff if you are willing to go for dietary counseling; Status:Active;  Requested    for:[id number];     Signatures  Electronically signed by : Jorge L Lira LPN; Feb 24 0921  5:43FY EST                        Electronically signed by : Tab Padilla LPN; Feb 24 8708  0:33VO EST                        Electronically signed by : Rita Solorio M.D.; Feb 25 2021  2:35PM EST

## 2021-04-06 NOTE — ED NOTES
Pt resting in room with  at bedside. Pt does not speak english but is able to express if she is in pain. Interpretor pad was used during assessment. Covid swab sent along with labs and troponin. Will wait for bed assignment to call report.       Lynn Osullivan RN  04/06/21 9714

## 2021-04-06 NOTE — ED PROVIDER NOTES
[address] [facility] ED  EMERGENCY DEPARTMENT ENCOUNTER      Pt Name: Jaylin Lopez  MRN: [id number]  [place] 1955  Date of evaluation: 4/6/2021  Provider: Faraz Mohan Dr       Chief Complaint   Patient presents with    Chest Pain         HISTORY OF PRESENT ILLNESS   (Location/Symptom, Timing/Onset, Context/Setting, Quality, Duration, Modifying Factors, Severity)  Note limiting factors. Jaylin Lopez is a 63 y.o. female who per chart reviews a past medical history of PVD osteomyelitis type 2 diabetes hypertension hyperlipidemia hypothyroidism diastolic and systolic CHF JESICA pulmonary hypertension CVA schizophrenia CKD III, severe MR, CAD prior CABG s/p PCI/stent of the ostial circumflex 4/24/20 presents to the emergency department with gradual onset, constant, severe, 10/10 left sided, non radiating chest pain described as pressure for \"a few days now. \" pt has tried tylenol without relief. She denies aggravating and alleviating factors, she states it does not matter what she does she continues to have the pain. Pain has continued to worsen. Cp is not associated with nv or diaphoresis. It is not exertional. + for constant SOB as well. Pt states she wears 2L of oxygen continuous at home per pulmonology. Per chart review, pulm recommends 2L while sleeping. Hx obtained by way of Irish interpretor. Last echo was 4/22/20 showed EF 40-45%. Follows with [facility] heart. She denies fever, chills, cough, leg swelling, hemoptysis, nvd, abd pain, urinary sx. No exposures to covid or other sick contacts. She is on Brilinta. HPI    Nursing Notes were reviewed. REVIEW OF SYSTEMS    (2-9 systems for level 4, 10 or more for level 5)     Review of Systems   Constitutional: Negative for chills and fever. HENT: Negative for congestion. Eyes: Negative for photophobia. Respiratory: Positive for shortness of breath. Negative for cough and wheezing.     Cardiovascular: Positive for chest pain. Negative for palpitations. Gastrointestinal: Negative for abdominal pain, nausea and vomiting. Genitourinary: Negative for dysuria, frequency and hematuria. Musculoskeletal: Negative for myalgias. Allergic/Immunologic: Negative for immunocompromised state. Neurological: Negative for dizziness, weakness and headaches. All other systems reviewed and are negative. Except as noted above the remainder of the review of systems was reviewed and negative.        PAST MEDICAL HISTORY     Past Medical History:   Diagnosis Date    Asthma     CAD (coronary artery disease)     CKD (chronic kidney disease) stage 3, GFR 30-59 ml/min     Colitis     [facility]     Hyperlipidemia     Hypertension     [facility])     Prolonged emergence from general anesthesia     PVD (peripheral vascular disease) ([facility]     Thyroid disease          SURGICAL HISTORY       Past Surgical History:   Procedure Laterality Date    CARDIAC SURGERY      CATARACT REMOVAL WITH IMPLANT Bilateral 11- 11-     SECTION      COLONOSCOPY N/A 2019    COLONOSCOPY DIAGNOSTIC performed by Claudeen Drown, MD at [address] [place] [address]  2019    unknown vessels    HYSTERECTOMY      TOE AMPUTATION Right     3rd toe         CURRENT MEDICATIONS       Previous Medications    ALBUTEROL (PROVENTIL) (2.5 MG/3ML) 0.083% NEBULIZER SOLUTION    Take 3 mLs by nebulization every 6 hours as needed for Wheezing    ALBUTEROL SULFATE HFA (VENTOLIN HFA) 108 (90 BASE) MCG/ACT INHALER    Inhale 2 puffs into the lungs as needed for Wheezing Every 4-6 hours PRN    ALCOHOL SWABS (ALCOHOL PREP) 70 % PADS    qid    AMITRIPTYLINE (ELAVIL) 25 MG TABLET    Take 25 mg by mouth nightly    ARTIFICIAL TEARS 1.4 % OPHTHALMIC SOLUTION        ASPIRIN 81 MG EC TABLET    Take 1 tablet by mouth daily    ATORVASTATIN (LIPITOR) 40 MG TABLET    Take 1 tablet by mouth nightly    BLOOD GLUCOSE TEST STRIPS (FREESTYLE LITE) STRIP    1 each by In Vitro route daily As needed. CONTINUOUS BLOOD GLUC  (FREESTYLE FELY 14 DAY READER) CATHLEEN    As directed    CONTINUOUS BLOOD GLUC SENSOR (FREESTYLE FELY 14 DAY SENSOR) MISC    Every 2 weeks    CONTINUOUS BLOOD GLUC SENSOR (FREESTYLE FELY 14 DAY SENSOR) MISC    Every 2 weeks    CPAP MACHINE MISC    by Does not apply route New CPAP with 10 cm    CYANOCOBALAMIN (VITAMIN B-12) 1000 MCG EXTENDED RELEASE TABLET    Take 1,000 mcg by mouth daily    DOCUSATE SODIUM (COLACE, DULCOLAX) 100 MG CAPS    Take 100 mg by mouth 2 times daily    DULAGLUTIDE (TRULICITY) 0.81 AO/0.2HP SOPN    Inject 0.75 mg into the skin once a week    EMOLLIENT (EUCERIN INTENSIVE REPAIR HAND) 2.5-10 % CREA    APPLY TO FEET AT BEDTIME; PLACE SOCKS OVER FEET AFTER APPLICATION IF NEEDED FOR DRY CRACKING FEET    FOLIC ACID (FOLVITE) 1 MG TABLET    Take 1 mg by mouth daily    FREESTYLE LANCETS MISC    1 each by Does not apply route daily    FUROSEMIDE (LASIX) 40 MG TABLET    Take 1 tablet by mouth daily    GABAPENTIN (NEURONTIN) 400 MG CAPSULE    Take 400 mg by mouth 2 times daily.  AM and 800 mg in pm-9pm    HALOPERIDOL (HALDOL) 5 MG TABLET    Take 5 mg by mouth daily    HYDRALAZINE (APRESOLINE) 50 MG TABLET    Take 1 tablet by mouth every 8 hours    HYDROXYZINE (VISTARIL) 25 MG CAPSULE    Take 50 mg by mouth 3 times daily as needed     IBUPROFEN (ADVIL;MOTRIN) 800 MG TABLET    take 1 tablet by mouth every 8 hours AS NEEDED FOR PAIN with food or milk    INSULIN GLARGINE (LANTUS SOLOSTAR) 100 UNIT/ML INJECTION PEN    50 units at bedtime    INSULIN GLARGINE (LANTUS) 100 UNIT/ML INJECTION VIAL    Inject 30 Units into the skin nightly    INSULIN LISPRO, 1 UNIT DIAL, (HUMALOG KWIKPEN) 100 UNIT/ML SOPN    15  units at each meals    INSULIN PEN NEEDLE (NOVOFINE) 32G X 6 MM MISC    qid    IRON POLYSACCH [id number] (NIFEREX-150 FORTE PO)    Take 1 tablet by mouth daily ISOSORBIDE DINITRATE (ISORDIL) 20 MG TABLET    Take 1 tablet by mouth 3 times daily    LEVOTHYROXINE (SYNTHROID) 50 MCG TABLET    take 1 tablet by mouth once daily    MECLIZINE (ANTIVERT) 25 MG TABLET    Take 25 mg by mouth three times daily    METOPROLOL SUCCINATE (TOPROL XL) 50 MG EXTENDED RELEASE TABLET    Take 1 tablet by mouth 2 times daily    MONTELUKAST (SINGULAIR) 10 MG TABLET    Take 1 tablet by mouth nightly    NEOMYCIN-POLYMYXIN-DEXAMETH (MAXITROL) 3.5-14278-0.1 OPHTHALMIC SUSPENSION    instill 1 drop into both eyes four times a day    NITROGLYCERIN (NITROSTAT) 0.4 MG SL TABLET    up to max of 3 total doses. If no relief after 1 dose, call 911. NUTRITIONAL SUPPLEMENTS (GLUCERNA SHAKE) LIQD    take as directed three times a day    OXYBUTYNIN (DITROPAN-XL) 5 MG EXTENDED RELEASE TABLET    take 1 tablet by mouth once daily    OXYGEN    2 lit O2 with sleep , please give O2 concentrator    PRAZOSIN (MINIPRESS) 2 MG CAPSULE    Take 2 mg by mouth nightly     RESTASIS 0.05 % OPHTHALMIC EMULSION        SERTRALINE (ZOLOFT) 100 MG TABLET    Take 200 mg by mouth daily     TICAGRELOR (BRILINTA) 90 MG TABS TABLET    Take 90 mg by mouth 2 times daily       ALLERGIES     Ambien [zolpidem tartrate], Capoten [captopril], Clioquinol, Cogentin [benztropine], Depakote [divalproex sodium], Effexor xr [venlafaxine hcl er], Geodon [ziprasidone hcl], Lisinopril, Lyrica [pregabalin], Navane [thiothixene], Pamelor [nortriptyline hcl], Remeron [mirtazapine], Risperdal [risperidone], Trazodone and nefazodone, and Wellbutrin [bupropion]    FAMILY HISTORY     History reviewed. No pertinent family history.        SOCIAL HISTORY       Social History     Socioeconomic History    Marital status:      Spouse name: None    Number of children: None    Years of education: None    Highest education level: None   Occupational History    None   Social Needs    Financial resource strain: None    Food insecurity     Worry: None Inability: None    Transportation needs     Medical: None     Non-medical: None   Tobacco Use    Smoking status: Never Smoker    Smokeless tobacco: Never Used   Substance and Sexual Activity    Alcohol use: Never     Frequency: Never    Drug use: Never    Sexual activity: None   Lifestyle    Physical activity     Days per week: None     Minutes per session: None    Stress: None   Relationships    Social connections     Talks on phone: None     Gets together: None     Attends Jain service: None     Active member of club or organization: None     Attends meetings of clubs or organizations: None     Relationship status: None    Intimate partner violence     Fear of current or ex partner: None     Emotionally abused: None     Physically abused: None     Forced sexual activity: None   Other Topics Concern    None   Social History Narrative         Lives With: Spouse    Type of Home: [address] [place] [address] [address]: One level    Home Access: Elevator    Bathroom Shower/Tub: Tub/Shower unit(Simultaneous filing. User may not have seen previous data.)    Bathroom Equipment: Grab bars in shower, Shower chair    Home Equipment: Rolling walker, [address] 450 bed    ADL Assistance: Needs assistance    Homemaking Assistance: Needs assistance    Homemaking Responsibilities: No    Ambulation Assistance: Independent    Transfer Assistance: Independent    Active : No    Additional Comments: Pt wears orthopedic shoes at baseline    The patient states she is current with [facility](RN per the ). The patient had a walker, but obtained a hospital bed, wheel chair, BSC and O2 last admission (from [address] [place] [address] Road). pharmacy is [address] [place] [address],[address] [address]., [facility].       Transportation is provided by [employer]	[place] () per the patient       SCREENINGS          Heart Score for chest pain patients  History: Slightly Suspicious  ECG: Non-Specifc repolarization disturbance/LBTB/PM  Patient Age: > 39 and < 65 years  *Risk factors for Atherosclerotic disease: Diabetes Mellitus, Hypercholesterolemia, Hypertension, Obesity, Coronary Artery Disease  Risk Factors: > 3 Risk factors or history of atherosclerotic disease*  Troponin: > 1 and < 3X normal limit  Heart Score Total: 5             PHYSICAL EXAM    (up to 7 for level 4, 8 or more for level 5)     ED Triage Vitals [21 0141]   BP Temp Temp Source Pulse Resp SpO2 Height Weight   -- 98 °F (36.7 °C) Oral 67 22 98 % 5' 4\" (1.626 m) 240 lb (108.9 kg)       Physical Exam  Constitutional:       General: She is not in acute distress. Appearance: She is well-developed. She is not ill-appearing, toxic-appearing or diaphoretic. HENT:      Head: Normocephalic and atraumatic. Nose: Nose normal.      Mouth/Throat:      Mouth: Mucous membranes are moist.   Eyes:      Pupils: Pupils are equal, round, and reactive to light. Neck:      Musculoskeletal: Normal range of motion. Cardiovascular:      Rate and Rhythm: Normal rate and regular rhythm. Pulses: Normal pulses. Heart sounds: No murmur. No friction rub. No gallop. Pulmonary:      Effort: Pulmonary effort is normal.      Breath sounds: Normal breath sounds. No wheezing, rhonchi or rales. Abdominal:      General: Bowel sounds are normal. There is no distension. Palpations: Abdomen is soft. Tenderness: There is no abdominal tenderness. There is no guarding or rebound. Musculoskeletal:         General: No swelling. Right lower le+ Pitting Edema present. Left lower le+ Pitting Edema present. Skin:     General: Skin is warm and dry. Capillary Refill: Capillary refill takes less than 2 seconds. Neurological:      General: No focal deficit present. Mental Status: She is alert and oriented to person, place, and time.    Psychiatric:      Comments: Tardive dyskinesia, pt on Haldol 04/06/2021 03:14,  by Karen Segura   APTT   PROTIME-INR   MAGNESIUM   CK   TSH WITHOUT REFLEX    Narrative:     CALL  Martinez  [facility] tel. [phone],  Troponin results called to and read back by bruno aftab, 04/06/2021 03:14,  by Karen Segura   D-DIMER, QUANTITATIVE   URINE RT REFLEX TO CULTURE   CBC   APTT   PROTIME-INR   HEPARIN LEVEL/ANTI-XA   HEPARIN LEVEL/ANTI-XA   TROPONIN   POCT GLUCOSE       All other labs were within normal range or not returned as of this dictation. EMERGENCY DEPARTMENT COURSE and DIFFERENTIAL DIAGNOSIS/MDM:   Vitals:    Vitals:    04/06/21 0300 04/06/21 0330 04/06/21 0400 04/06/21 0444   BP: 139/61 126/66 92/69 (!) 143/115   Pulse: 61 62 66 64   Resp: 15 15 19 20   Temp:       TempSrc:       SpO2: 100% 98% 96% 99%   Weight:       Height:           MDM    Pt is a 62 yo F who presents to the ED with cp, sob. He is afebrile and HD stable. She was given PO ASA, IV morphine and IV zofran in the ED. EKG shows NSR with HR 70, normal axis, R 212, no ST changes. 1st degree AV block. T wave abnormalities, new t wave inversions I, II, III, AVF. 1st degree AV block is also new compared to 9/29/20. BNP 5170 which is around patient's baseline. clinically does not appear to be in acute CHF. CMP is remarkable for a glucose of 223 BUN 52 creatinine 1.3 and GFR 41 consistent with patient's baseline CKD stage III. CBC remarkable for anemia with hemoglobin of 11.7 hematocrit 35.2. Trop elevated to 0.310. remainder of labs unremarkable. D-dimer wnl. Pt with NSTEMI. Pt given IV heparin bolus and drip at this time. CP improved from 10/10 to 6/10 with morphine. Held off on additional pain medications or nitro because blood pressure dropped to 92/66 with morphine. [facility] consulted. Spoke with Dr. Jaz Ba who states pt likely needs cath and recommended to speak with Dr. Marycarmen Obando. Spoke with Dr. Marycarmen Obando who recommends nitro drip at 5 mcg/min as BP has come back up and titrate up to 10 mcg/min as patient tolerates.  Pt will need cath this morning. Repeat trop essentially unchanged at 0.320. pt states cp very minimal, mostly resolved at 0-1/10. Remains hemodynamically stable. Pt will be admitted to the floor, cath planned for this morning. Dr. Cari Shaffer accepts pt to the floor. Pt stable for admission. REASSESSMENT          CRITICAL CARE TIME   Total Critical Care time was 0 minutes, excluding separately reportable procedures. There was a high probability of clinically significant/life threatening deterioration in the patient's condition which required my urgent intervention. CONSULTS:  None    PROCEDURES:  Unless otherwise noted below, none     Procedures        FINAL IMPRESSION      1. NSTEMI (non-ST elevated myocardial infarction) [facility])          DISPOSITION/PLAN   DISPOSITION Decision To Admit 04/06/2021 03:25:44 AM      PATIENT REFERRED TO:  No follow-up provider specified. DISCHARGE MEDICATIONS:  New Prescriptions    No medications on file     Controlled Substances Monitoring:     No flowsheet data found.     (Please note that portions of this note were completed with a voice recognition program.  Efforts were made to edit the dictations but occasionally words are mis-transcribed.)    Bryce Gant PA-C (electronically signed)             Bryce Gant PA-C  04/06/21 2575

## 2021-04-06 NOTE — CARE COORDINATION
[facility] Case Management Initial Discharge Assessment    Met with Patient to discuss discharge plan.  used for this interview with patient. PCP: Mike Gonzalez                                Date of Last Visit: Current    If no PCP, list provided? N/A    Discharge Planning    Living Arrangements: independently at home and at home dependent on family care    Who do you live with? Spouse    Who helps you with your care:  self or spouse    If lives at home:     Do you have any barriers navigating in your home? yes - Has rollator she uses    Patient can perform ADL? Yes    Current Services (outpatient and in home) :  None    Dialysis: No    Is transportation available to get to your appointments? Yes  - PT does not drive or hav license . Her  drives her . DME Equipment:  yes - Rollator,wheelchair,shower bench,hospital bed     Respiratory equipment: Continuous Oxygen  2 Liters  and CPAP with 2 Liters of O2    Respiratory provider:  yes - Pt unsure     Pharmacy:  yes - [facility] with Medication Assistance Program?  No      Patient agreeable to [facility]? Yes, Company Had [facility] in past not sure which agency    Patient agreeable to SNF/Rehab? Declined    Other discharge needs identified? Cardiac Rehab    [place] of choice list provided with basic dialogue that supports the patient's individualized plan of care/goals and shares the quality data associated with the providers. Yes    Does Patient Have a High-Risk for Readmission Diagnosis (CHF, PN, MI, COPD)? Yes    If Yes,     Consult with pulmonologist? Yes   Consult with cardiologist? Yes   Cardiac Rehab referral if EF <35%? Yes   Consult with Pharmacy for medication assessment prior to discharge? Yes   Consult with Behavioral health to aid in depression, anxiety, or coping issues? Yes   Palliative Care Consult? Yes   Pulmonary Rehab order for COPD, PN, and CHF (if EF > 35%)?  N/A    Does

## 2021-04-06 NOTE — PROGRESS NOTES
Assessment and medication reconciliation completed using interpret zoom. Pt aware of cardiac cath for today and consent obtained with use of . Called  Ravi Hint for update by myself and pt. Aware of plan of care.

## 2021-04-06 NOTE — PROGRESS NOTES
Received pt from cath lab dressing at right grion dry and intact, area is soft no bruising noted. Pt on bedpan unable to viod, straight cathed for 800 ml clear yellow urine. Pt denies chest or SOB at this time.

## 2021-04-06 NOTE — CONSULTS
Pulmonary and Critical Care Medicine  Consult Note  Encounter Date: 2021 8:51 AM    Ms. Santino Macias is a 63 y.o. female  : 1955  Requesting Provider: Reyes Magallon MD    Reason for request: ICU management             HISTORY OF PRESENT ILLNESS:    Patient is 63 y.o. presents with chest pain, started 1 day prior to admission, localized to the left side, 10 out of 10, pain currently improved, she was found to have slightly increased troponin currently admitted for ST vision MI, no fever or chills, no nausea no vomiting, no diarrhea. Patient has history of asthma, and JESICA. Past Medical History:        Diagnosis Date    Asthma     CAD (coronary artery disease)     CKD (chronic kidney disease) stage 3, GFR 30-59 ml/min     Colitis     Diabetes [facility] ([facility]     Hyperlipidemia     Hypertension     [facility])     Prolonged emergence from general anesthesia     PVD (peripheral vascular disease) ([facility]     Thyroid disease        Past Surgical History:        Procedure Laterality Date    CARDIAC SURGERY      CATARACT REMOVAL WITH IMPLANT Bilateral 11- 11-     SECTION      COLONOSCOPY N/A 2019    COLONOSCOPY DIAGNOSTIC performed by Maury Saha MD at [address] [place] [address] [place]  2019    unknown vessels    HYSTERECTOMY      TOE AMPUTATION Right     3rd toe       Social History:     reports that she has never smoked. She has never used smokeless tobacco. She reports that she does not drink alcohol or use drugs. Family History:   History reviewed. No pertinent family history.   History of lung cancer, patient mother  Allergies:  Ambien [zolpidem tartrate], Capoten [captopril], Clioquinol, Cogentin [benztropine], Depakote [divalproex sodium], Effexor xr [venlafaxine hcl er], Geodon [ziprasidone hcl], Lisinopril, Lyrica [pregabalin], Navane [thiothixene], Pamelor [nortriptyline hcl], Remeron

## 2021-04-06 NOTE — PROGRESS NOTES
Pt received from ED into ICU 8. Awake and alert, Turkish speaking. Stares pain is 3 n scale increased nitro at this time.

## 2021-04-06 NOTE — H&P
Patient is a 60-year-old female with past medical history of coronary disease, peripheral artery disease and vascular disease diabetes comes in with chest pain started yesterday. The chest pain is left-sided, 2 out of 10 on scale severity sharp pain. No alleviating or aggravating factors. Patient chest pain is not associated with nausea vomiting or abd pain  Left sided, crushing pain. Patient has chronic respiratory failure wears 2 L oxygen at home denies tobacco use. Last echo in  of last year showed EF of 40 to 45%. Past Medical History:   Diagnosis Date    Asthma     CAD (coronary artery disease)     CKD (chronic kidney disease) stage 3, GFR 30-59 ml/min     Colitis     Diabetes mellitus ([address]     Hyperlipidemia     Hypertension     [facility]AD [facility]peripheral [facility]) ([facility])     Prolonged emergence from general anesthesia     PVD (peripheral vascular disease) ([address]     Thyroid disease      Past Surgical History:   Procedure Laterality Date    CARDIAC SURGERY      CATARACT REMOVAL WITH IMPLANT Bilateral 11- 11-     SECTION      COLONOSCOPY N/A 2019    COLONOSCOPY DIAGNOSTIC performed by Evette Bauer MD at [address] [place] [address] GRAFT  2019    unknown vessels    HYSTERECTOMY      TOE AMPUTATION Right     3rd toe     Social History     Tobacco History     Smoking Status  Never Smoker    Smokeless Tobacco Use  Never Used          Alcohol History     Alcohol Use Status  Never          Drug Use     Drug Use Status  Never          Sexual Activity     Sexually Active  Not Asked            Review of Systems   Constitutional: Negative for activity change and appetite change. HENT: Negative for congestion and dental problem. Eyes: Negative for discharge. Respiratory: Positive for chest tightness. Negative for shortness of breath and stridor. Cardiovascular: Negative for chest pain.    Gastrointestinal: Negative for abdominal distention and abdominal pain. Endocrine: Negative for cold intolerance. Genitourinary: Negative for difficulty urinating. Allergic/Immunologic: Negative for environmental allergies. Neurological: Negative for dizziness. Psychiatric/Behavioral: Negative for agitation. Physical Exam  Constitutional:       Appearance: Normal appearance. HENT:      Head: Normocephalic and atraumatic. Right Ear: Tympanic membrane normal.      Left Ear: Tympanic membrane normal.      Nose: Nose normal.      Mouth/Throat:      Mouth: Mucous membranes are moist.   Eyes:      Pupils: Pupils are equal, round, and reactive to light. Neck:      Musculoskeletal: No neck rigidity. Cardiovascular:      Rate and Rhythm: Normal rate. Heart sounds: No murmur. Pulmonary:      Effort: No respiratory distress. Breath sounds: No wheezing. Abdominal:      General: There is no distension. Tenderness: There is no abdominal tenderness. Neurological:      Mental Status: She is alert. Psychiatric:         Mood and Affect: Mood normal.         History reviewed. No pertinent family history. No current facility-administered medications on file prior to encounter.       Current Outpatient Medications on File Prior to Encounter   Medication Sig Dispense Refill    albuterol (PROVENTIL) (2.5 MG/3ML) 0.083% nebulizer solution Take 3 mLs by nebulization every 6 hours as needed for Wheezing 120 each 5    albuterol sulfate HFA (VENTOLIN HFA) 108 (90 Base) MCG/ACT inhaler Inhale 2 puffs into the lungs as needed for Wheezing Every 4-6 hours PRN 1 Inhaler 5    montelukast (SINGULAIR) 10 MG tablet Take 1 tablet by mouth nightly 30 tablet 5    levothyroxine (SYNTHROID) 50 MCG tablet take 1 tablet by mouth once daily 30 tablet 5    Continuous Blood Gluc Sensor (FREESTYLE FELY 14 DAY SENSOR) MISC Every 2 weeks 2 each 06    Alcohol Swabs (ALCOHOL PREP) 70 % PADS qid 300 each 06    Insulin Pen Needle (NOVOFINE) 32G X 6 MM MISC qid 300 each 3    insulin glargine (LANTUS SOLOSTAR) 100 UNIT/ML injection pen 50 units at bedtime 15 pen 3    Dulaglutide (TRULICITY) 4.30 RO/4.7CM SOPN Inject 0.75 mg into the skin once a week 4 pen 3    insulin lispro, 1 Unit Dial, (HUMALOG KWIKPEN) 100 UNIT/ML SOPN 15  units at each meals 10 pen 03    oxybutynin (DITROPAN-XL) 5 MG extended release tablet take 1 tablet by mouth once daily 90 tablet 3    atorvastatin (LIPITOR) 40 MG tablet Take 1 tablet by mouth nightly 30 tablet 3    RESTASIS 0.05 % ophthalmic emulsion       ibuprofen (ADVIL;MOTRIN) 800 MG tablet take 1 tablet by mouth every 8 hours AS NEEDED FOR PAIN with food or milk      neomycin-polymyxin-dexameth (MAXITROL) 3.5-73773-3.1 ophthalmic suspension instill 1 drop into both eyes four times a day      ARTIFICIAL TEARS 1.4 % ophthalmic solution       docusate sodium (COLACE, DULCOLAX) 100 MG CAPS Take 100 mg by mouth 2 times daily 60 capsule 1    prazosin (MINIPRESS) 2 MG capsule Take 2 mg by mouth nightly       sertraline (ZOLOFT) 100 MG tablet Take 200 mg by mouth daily       Continuous Blood Gluc Sensor (FREESTYLE FELY 14 DAY SENSOR) MISC Every 2 weeks 2 each 06    Continuous Blood Gluc  (FREESTYLE FELY 14 DAY READER) CATHLEEN As directed 1 Device 00    furosemide (LASIX) 40 MG tablet Take 1 tablet by mouth daily 60 tablet 3    ticagrelor (BRILINTA) 90 MG TABS tablet Take 90 mg by mouth 2 times daily      CPAP Machine MISC by Does not apply route New CPAP with 10 cm 1 each 0    aspirin 81 MG EC tablet Take 1 tablet by mouth daily 30 tablet 3    OXYGEN 2 lit O2 with sleep , please give O2 concentrator 1 Units 0    Emollient (EUCERIN INTENSIVE REPAIR HAND) 2.5-10 % CREA APPLY TO FEET AT BEDTIME; PLACE SOCKS OVER FEET AFTER APPLICATION IF NEEDED FOR DRY CRACKING FEET      hydrOXYzine (VISTARIL) 25 MG capsule Take 50 mg by mouth 3 times daily as needed       Nutritional Supplements (GLUCERNA SHAKE) LIQD take as directed three times a day      insulin glargine (LANTUS) 100 UNIT/ML injection vial Inject 30 Units into the skin nightly (Patient taking differently: Inject 35 Units into the skin nightly ) 1 vial 3    isosorbide dinitrate (ISORDIL) 20 MG tablet Take 1 tablet by mouth 3 times daily 90 tablet 3    nitroGLYCERIN (NITROSTAT) 0.4 MG SL tablet up to max of 3 total doses. If no relief after 1 dose, call 911. 25 tablet 3    hydrALAZINE (APRESOLINE) 50 MG tablet Take 1 tablet by mouth every 8 hours 90 tablet 3    metoprolol succinate (TOPROL XL) 50 MG extended release tablet Take 1 tablet by mouth 2 times daily 30 tablet 3    amitriptyline (ELAVIL) 25 MG tablet Take 25 mg by mouth nightly      gabapentin (NEURONTIN) 400 MG capsule Take 400 mg by mouth 2 times daily. AM and 800 mg in pm-9pm      haloperidol (HALDOL) 5 MG tablet Take 5 mg by mouth daily      FreeStyle Lancets MISC 1 each by Does not apply route daily 100 each 3    blood glucose test strips (FREESTYLE LITE) strip 1 each by In Vitro route daily As needed. 269 each 3    folic acid (FOLVITE) 1 MG tablet Take 1 mg by mouth daily      Iron Polysacch Iotoz-W35-OS (NIFEREX-150 FORTE PO) Take 1 tablet by mouth daily       Cyanocobalamin (VITAMIN B-12) 1000 MCG extended release tablet Take 1,000 mcg by mouth daily      meclizine (ANTIVERT) 25 MG tablet Take 25 mg by mouth three times daily       Lab Results   Component Value Date    WBC 10.5 04/06/2021    HGB 11.7 (L) 04/06/2021    HCT 35.2 (L) 04/06/2021    MCV 74.6 (L) 04/06/2021     04/06/2021     Lab Results   Component Value Date     04/06/2021    K 4.5 04/06/2021    K 4.6 05/07/2020    CL 95 04/06/2021    CO2 27 04/06/2021    BUN 52 04/06/2021    CREATININE 1.30 04/06/2021    GLUCOSE 223 04/06/2021    GLUCOSE 279 01/06/2020    CALCIUM 8.9 04/06/2021      1) NSTEMI  2) DM 2 with hyperlgycemia  3) HTN  4) CAD  Admit to ICU.   ER physician assistant spoke with cardiology and recommended to patient started on nitro and heparin drip. For possible cardiac cath today. We will keep the patient n.p.o. for now. Echo cardiogram as per cardiology. Trend cardiac enzymes. IV fluids. SSI with low-dose coverage. Avoid hypoglycemia. Patient is a full code.   Continue telemetry

## 2021-04-07 VITALS
SYSTOLIC BLOOD PRESSURE: 142 MMHG | TEMPERATURE: 97 F | WEIGHT: 241.84 LBS | BODY MASS INDEX: 41.29 KG/M2 | DIASTOLIC BLOOD PRESSURE: 60 MMHG | RESPIRATION RATE: 14 BRPM | OXYGEN SATURATION: 99 % | HEIGHT: 64 IN | HEART RATE: 73 BPM

## 2021-04-07 LAB
ANION GAP SERPL CALCULATED.3IONS-SCNC: 12 MEQ/L (ref 9–15)
BUN BLDV-MCNC: 42 MG/DL (ref 8–23)
CALCIUM SERPL-MCNC: 8.1 MG/DL (ref 8.5–9.9)
CHLORIDE BLD-SCNC: 100 MEQ/L (ref 95–107)
CO2: 23 MEQ/L (ref 20–31)
CREAT SERPL-MCNC: 1.13 MG/DL (ref 0.5–0.9)
GFR AFRICAN AMERICAN: 58.8
GFR NON-AFRICAN AMERICAN: 48.6
GLUCOSE BLD-MCNC: 180 MG/DL (ref 60–115)
GLUCOSE BLD-MCNC: 186 MG/DL (ref 60–115)
GLUCOSE BLD-MCNC: 253 MG/DL (ref 70–99)
GLUCOSE BLD-MCNC: 269 MG/DL (ref 60–115)
PERFORMED ON: ABNORMAL
POTASSIUM SERPL-SCNC: 5.2 MEQ/L (ref 3.4–4.9)
SODIUM BLD-SCNC: 135 MEQ/L (ref 135–144)

## 2021-04-07 PROCEDURE — 36415 COLL VENOUS BLD VENIPUNCTURE: CPT

## 2021-04-07 PROCEDURE — 6370000000 HC RX 637 (ALT 250 FOR IP): Performed by: INTERNAL MEDICINE

## 2021-04-07 PROCEDURE — 2500000003 HC RX 250 WO HCPCS: Performed by: INTERNAL MEDICINE

## 2021-04-07 PROCEDURE — 94640 AIRWAY INHALATION TREATMENT: CPT

## 2021-04-07 PROCEDURE — 97165 OT EVAL LOW COMPLEX 30 MIN: CPT

## 2021-04-07 PROCEDURE — 94761 N-INVAS EAR/PLS OXIMETRY MLT: CPT

## 2021-04-07 PROCEDURE — 97161 PT EVAL LOW COMPLEX 20 MIN: CPT

## 2021-04-07 PROCEDURE — 80048 BASIC METABOLIC PNL TOTAL CA: CPT

## 2021-04-07 PROCEDURE — 99232 SBSQ HOSP IP/OBS MODERATE 35: CPT | Performed by: INTERNAL MEDICINE

## 2021-04-07 RX ORDER — RANOLAZINE 500 MG/1
500 TABLET, EXTENDED RELEASE ORAL 2 TIMES DAILY
Qty: 60 TABLET | Refills: 3 | Status: ON HOLD | OUTPATIENT
Start: 2021-04-07 | End: 2021-01-01 | Stop reason: HOSPADM

## 2021-04-07 RX ORDER — RANOLAZINE 500 MG/1
500 TABLET, EXTENDED RELEASE ORAL 2 TIMES DAILY
Status: DISCONTINUED | OUTPATIENT
Start: 2021-04-07 | End: 2021-04-07 | Stop reason: HOSPADM

## 2021-04-07 RX ORDER — ATORVASTATIN CALCIUM 40 MG/1
80 TABLET, FILM COATED ORAL NIGHTLY
Qty: 30 TABLET | Refills: 3 | Status: SHIPPED | OUTPATIENT
Start: 2021-04-07 | End: 2021-01-01

## 2021-04-07 RX ADMIN — MECLIZINE HYDROCHLORIDE 25 MG: 25 TABLET ORAL at 08:50

## 2021-04-07 RX ADMIN — IPRATROPIUM BROMIDE AND ALBUTEROL SULFATE 1 AMPULE: .5; 3 SOLUTION RESPIRATORY (INHALATION) at 04:57

## 2021-04-07 RX ADMIN — LEVOTHYROXINE SODIUM 50 MCG: 0.03 TABLET ORAL at 06:34

## 2021-04-07 RX ADMIN — HALOPERIDOL 5 MG: 5 TABLET ORAL at 08:51

## 2021-04-07 RX ADMIN — HYDRALAZINE HYDROCHLORIDE 50 MG: 50 TABLET, FILM COATED ORAL at 15:42

## 2021-04-07 RX ADMIN — GABAPENTIN 400 MG: 400 CAPSULE ORAL at 08:52

## 2021-04-07 RX ADMIN — METOPROLOL SUCCINATE 50 MG: 50 TABLET, EXTENDED RELEASE ORAL at 08:50

## 2021-04-07 RX ADMIN — ISOSORBIDE DINITRATE 20 MG: 10 TABLET ORAL at 11:20

## 2021-04-07 RX ADMIN — FOLIC ACID 1 MG: 1 TABLET ORAL at 08:50

## 2021-04-07 RX ADMIN — TICAGRELOR 90 MG: 90 TABLET ORAL at 08:51

## 2021-04-07 RX ADMIN — SERTRALINE HYDROCHLORIDE 200 MG: 50 TABLET ORAL at 08:50

## 2021-04-07 RX ADMIN — INSULIN LISPRO 3 UNITS: 100 INJECTION, SOLUTION INTRAVENOUS; SUBCUTANEOUS at 12:23

## 2021-04-07 RX ADMIN — INSULIN LISPRO 1 UNITS: 100 INJECTION, SOLUTION INTRAVENOUS; SUBCUTANEOUS at 17:16

## 2021-04-07 RX ADMIN — NITROGLYCERIN 50 MCG/MIN: 20 INJECTION INTRAVENOUS at 00:09

## 2021-04-07 RX ADMIN — FUROSEMIDE 40 MG: 40 TABLET ORAL at 08:51

## 2021-04-07 RX ADMIN — RANOLAZINE 500 MG: 500 TABLET, FILM COATED, EXTENDED RELEASE ORAL at 11:19

## 2021-04-07 RX ADMIN — INSULIN LISPRO 1 UNITS: 100 INJECTION, SOLUTION INTRAVENOUS; SUBCUTANEOUS at 08:56

## 2021-04-07 RX ADMIN — IPRATROPIUM BROMIDE AND ALBUTEROL SULFATE 1 AMPULE: .5; 3 SOLUTION RESPIRATORY (INHALATION) at 12:15

## 2021-04-07 RX ADMIN — IPRATROPIUM BROMIDE AND ALBUTEROL SULFATE 1 AMPULE: .5; 3 SOLUTION RESPIRATORY (INHALATION) at 15:51

## 2021-04-07 RX ADMIN — ASPIRIN 81 MG: 81 TABLET, COATED ORAL at 08:50

## 2021-04-07 RX ADMIN — HYDRALAZINE HYDROCHLORIDE 50 MG: 50 TABLET, FILM COATED ORAL at 06:34

## 2021-04-07 RX ADMIN — CYANOCOBALAMIN TAB 500 MCG 1000 MCG: 500 TAB at 08:50

## 2021-04-07 RX ADMIN — MECLIZINE HYDROCHLORIDE 25 MG: 25 TABLET ORAL at 15:42

## 2021-04-07 ASSESSMENT — PAIN SCALES - GENERAL
PAINLEVEL_OUTOF10: 0

## 2021-04-07 NOTE — PROGRESS NOTES
[facility] Progress Note        Date:   2021    Patient:    Ozzy Jaramillo    :    1955  CSN:    [id number]    Rounding Cardiologist: Marisa Hernandez MD     Primary Cardiologist: Stella Deutsch MD  [facility]    Requesting Physician:  Oanh Gaston MD      Reason for Initial Consult:  Chest Pain, NSTEMI    Subjective:    Patient had repeat cardiac catheterization yesterday by Dr. Karthik Coelho with patent LIMA to the LAD bypass, patent left circumflex stent, and occluded right coronary artery with L>R Collateral for LIMA LAD. LVEF 50%. Attempted proximal LAD stenting was not able to be performed due to inability to cross lesion with a balloon. Films were reviewed by Dr. Maria Victoria Dubois as well at [facility] and medical treatment was advised. Patient does have a component of costochondritis chronically which adds to her chest pain symptoms. She is doing well at this time without new symptoms. She has no shortness of breath or palpitations. She feels well. Using virtual , patient's medical condition and cardiac catheterization and plans were discussed in detail with her. She understands and is accepting of medical treatment. Discharge is being planned. 14 system General and Cardiac ROS otherwise negative or unchanged. Assessment:    1. Unstable Angina, resolved  2. NSTEMI, Moderate Flat Troponin elevation, Type 2 demand likely. 3. Chest pain, costochondritis Hx, reproducible, musculoskeletal.  4. CAD post CABG 2019 [facility], LIMA to LAD, PCI to left circumflex and right coronary artery, last 2020. Repeat Cath 21 with Stable CAD with Patent LIMA to LAD and Patent Stents to LCX,  Occluded RCA with collaterals. Medical treatment advised. 5. Ischemic cardiomyopathy with apical hypokinesia LVEF 50%. 6. PVOD post right lower extremity revascularization,  and [facility] Dr Mo Bravo, (details not available), Right third toe amputation site infection.   7. Carotid artery disease with ultrasound noted 50-69% bilateral stenoses May 2019.  8. Syncope, presyncope  9. Heart failure Hx, Systolic, Diastolic  10. Hypothyroidism  11. Hypertension  12. Hyperlipidemia  13. Diabetes  14. Renal Insufficency, Cr 1.3  15. Anemia, Hgb 11.6  16. Depression  17. Schizophrenia  18. Family history of coronary artery disease  23. Multiple allergies as noted  20. Covid Negative Status    Plan:    1. Cardiac Supportive Care  2. CAD Medical Treatment Planned. 3. Maximize cardiac medications as tolerated with Ranexa added. 4. Encourage Hydration  5. Primary service care for possible other medical issues. 6. OK to PA home from CV point once OK with Others. 7. Follow up with Cardiology as noted. 8. See Orders        HISTORY OF PRESENT ILLNESS:      Jess Lennox is a pleasant 63 y.o. female who presented to the emergency room with a several day history of chest discomfort symptoms and in the emergency room was noted to have elevated troponins and ECG changes concerning for unstable angina. She does have a history of costochondritis as well as prior congestive heart failure ejection fraction 45% and prior coronary bypass surgery with most recent ostial left circumflex stenting April 2020. She has been on Brilinta. She states that she has been taking her medications. Her symptoms are always somewhat difficult to sort out especially given her language barrier. She was admitted for further evaluation of her chest pain symptoms and cardiology was consulted. Of note interventional cardiology was also called and agreed with admission and cardiac catheterization to exclude progressive coronary artery disease. D-dimer was negative. Patient History and Records, EMR reviewed. Patient Interviewed and examined. Poor historian, language barrier, speaks English predominantly. Denies LH, Dizziness, TIA or CVA Symptoms. No Orthopnea, Edema or CHF symptoms. No Palpitations. No Syncope. No Fever, Chills or Cold symptoms. No GI,  or Bleeding complaints. Cardiac and general ROS otherwise negative. [address] otherwise negative other than noted. Past Medical History:   Diagnosis Date    Asthma     CAD (coronary artery disease)     CKD (chronic kidney disease) stage 3, GFR 30-59 ml/min     Colitis     Diabetes mellitus ([facility]     Hyperlipidemia     Hypertension     [facility]peripheral [facility]) [facility])     Prolonged emergence from general anesthesia     PVD (peripheral vascular disease) ([facility]     Thyroid disease        Past Surgical History:   Procedure Laterality Date    CARDIAC SURGERY      CATARACT REMOVAL WITH IMPLANT Bilateral 11- 11-     SECTION      COLONOSCOPY N/A 2019    COLONOSCOPY DIAGNOSTIC performed by Alex Farmer MD at [address] [facility] GRAFT  2019    unknown vessels    HYSTERECTOMY      TOE AMPUTATION Right     3rd toe       Prior to Admission medications    Medication Sig Start Date End Date Taking?  Authorizing Provider   albuterol (PROVENTIL) (2.5 MG/3ML) 0.083% nebulizer solution Take 3 mLs by nebulization every 6 hours as needed for Wheezing 3/25/21 4/24/21 Yes Vinod Stafford MD   albuterol sulfate HFA (VENTOLIN HFA) 108 (90 Base) MCG/ACT inhaler Inhale 2 puffs into the lungs as needed for Wheezing Every 4-6 hours PRN 3/25/21  Yes Vinod Stafford MD   montelukast (SINGULAIR) 10 MG tablet Take 1 tablet by mouth nightly 3/25/21  Yes Vinod Stafford MD   levothyroxine (SYNTHROID) 50 MCG tablet take 1 tablet by mouth once daily 3/24/21  Yes Woo Alexandre MD   insulin glargine (LANTUS SOLOSTAR) 100 UNIT/ML injection pen 50 units at bedtime 3/23/21  Yes Abel Vaughn MD   Dulaglutide (TRULICITY) 1.24 VD/0.1VM SOPN Inject 0.75 mg into the skin once a week 3/23/21  Yes Woo Alexandre MD   oxybutynin (DITROPAN-XL) 5 MG extended release tablet take 1 tablet by mouth once daily 3/19/21  Yes Sherwin Osgood Take 25 mg by mouth nightly   Yes Historical Provider, MD   gabapentin (NEURONTIN) 400 MG capsule Take 400 mg by mouth 2 times daily.  AM and 800 mg in pm-9pm   Yes Historical Provider, MD   haloperidol (HALDOL) 5 MG tablet Take 5 mg by mouth daily   Yes Historical Provider, MD   folic acid (FOLVITE) 1 MG tablet Take 1 mg by mouth daily   Yes Historical Provider, MD   Iron Polysacch Noutc-V22-FB (NIFEREX-150 FORTE PO) Take 1 tablet by mouth daily    Yes Historical Provider, MD   Cyanocobalamin (VITAMIN B-12) 1000 MCG extended release tablet Take 1,000 mcg by mouth daily   Yes Historical Provider, MD   meclizine (ANTIVERT) 25 MG tablet Take 25 mg by mouth three times daily   Yes Historical Provider, MD   Continuous Blood Gluc Sensor (FREESTYLE FELY 14 DAY SENSOR) MISC Every 2 weeks 3/24/21   Melania Hernandez MD   Alcohol Swabs (ALCOHOL PREP) 70 % PADS qid 3/24/21   Melania Hernandez MD   Insulin Pen Needle (NOVOFINE) 32G X 6 MM MISC qid 3/24/21   Melania Hernandez MD   insulin lispro, 1 Unit Dial, (HUMALOG KWIKPEN) 100 UNIT/ML SOPN 15  units at each meals 3/23/21   Melania Hernandez MD   RESTASIS 0.05 % ophthalmic emulsion  3/8/21   Historical Provider, MD   neomycin-polymyxin-dexameth (MAXITROL) 3.5-35048-5.1 ophthalmic suspension instill 1 drop into both eyes four times a day 1/5/21   Historical Provider, MD   ARTIFICIAL TEARS 1.4 % ophthalmic solution  3/8/21   Historical Provider, MD   Continuous Blood Gluc Sensor (FREESTYLE FELY 14 DAY SENSOR) MISC Every 2 weeks 9/21/20   Melania Hernandez MD   Continuous Blood Gluc  (FREESTYLE FELY 14 DAY READER) CATHLEEN As directed 9/21/20   Melania Hernandez MD   Emollient (EUCERIN INTENSIVE REPAIR HAND) 2.5-10 % CREA APPLY TO FEET AT BEDTIME; PLACE SOCKS OVER FEET AFTER APPLICATION IF NEEDED FOR DRY CRACKING FEET 3/15/20   Historical Provider, MD   Nutritional Supplements ([address]) LIQD take as directed three times a day 6/1/20   Historical Provider, MD   FreeStyle Lancets MISC 1 each by Does  Remeron [Mirtazapine]     Risperdal [Risperidone]     Trazodone And Nefazodone     Wellbutrin [Bupropion]        Social History     Socioeconomic History    Marital status:      Spouse name: Not on file    Number of children: Not on file    Years of education: Not on file    Highest education level: Not on file   Occupational History    Not on file   Social Needs    Financial resource strain: Not on file    Food insecurity     Worry: Not on file     Inability: Not on file    Transportation needs     Medical: Not on file     Non-medical: Not on file   Tobacco Use    Smoking status: Never Smoker    Smokeless tobacco: Never Used   Substance and Sexual Activity    Alcohol use: Never     Frequency: Never    Drug use: Never    Sexual activity: Not on file   Lifestyle    Physical activity     Days per week: Not on file     Minutes per session: Not on file    Stress: Not on file   Relationships    Social connections     Talks on phone: Not on file     Gets together: Not on file     Attends Rastafari service: Not on file     Active member of club or organization: Not on file     Attends meetings of clubs or organizations: Not on file     Relationship status: Not on file    Intimate partner violence     Fear of current or ex partner: Not on file     Emotionally abused: Not on file     Physically abused: Not on file     Forced sexual activity: Not on file   Other Topics Concern    Not on file   Social History Narrative         Lives With: Spouse    Type of Home: [address] [place] [address] in [address]: One level    Home Access: Elevator    Bathroom Shower/Tub: Tub/Shower unit(Simultaneous filing.  User may not have seen previous data.)    Bathroom Equipment: Grab bars in shower, Shower chair    Home Equipment: Rolling walker, [address] 450 bed    ADL Assistance: Needs assistance    Homemaking Assistance: Needs assistance    Homemaking Responsibilities: No Ambulation Assistance: Independent    Transfer Assistance: Independent    Active : No    Additional Comments: Pt wears orthopedic shoes at baseline    The patient states she is current with [facility](RN per the ). The patient had a walker, but obtained a hospital bed, wheel chair, BSC and O2 last admission (from [address]). pharmacy is [address] [place] [address],[address] [address]., [facility]. Transportation is provided by [employer]	[place] () per the patient       History reviewed. No pertinent family history. Review Of Systems:    14 point ROS negative other than mentioned. Physical Exam:    CURRENT VITALS: /64   Pulse 75   Temp 97 °F (36.1 °C) (Oral)   Resp 19   Ht 5' 4\" (1.626 m)   Wt 241 lb 13.5 oz (109.7 kg)   SpO2 100%   BMI 41.51 kg/m²     CONSTITUTIONAL:  awake, alert, cooperative, no apparent distress, anxious. ENT:  Normocephalic, without obvious abnormality, atraumatic, sinuses nontender on palpation, external ears without lesions,  NECK:  Supple, symmetrical, trachea midline, no adenopathy, thyroid symmetric, not enlarged and no tenderness, skin normal, No bruits. LUNGS:  No increased work of breathing, good air exchange, clear to auscultation bilaterally, no crackles, no wheezing  CHEST: Midline scar well-healed. Reproducible left ijeoma-breast discomfort with palpation. CARDIOVASCULAR:  Normal apical impulse, regular rate and rhythm, normal S1 and S2,  1/6 Systolic murmur noted. ABDOMEN:  Obese, normal bowel sounds, soft, non-distended, non-tender, no masses palpated, no hepatosplenomegally  EXTREMETIES: No edema, Pulses Strong Thruout. No ulcers. NEUROLOGIC:  Awake, alert, oriented to name, place and time. Following all commands and moving all extremties.   SKIN:  no bruising or bleeding, normal skin color, texture, turgor and no rashes       Labs:  Recent Results (from the past 24 hour(s))   POCT Glucose    Collection Time: 04/06/21  2:03 PM Result Value Ref Range    POC Glucose 157 (H) 60 - 115 mg/dl    Performed on ACCU-CHEK    POCT Glucose    Collection Time: 04/06/21  6:08 PM   Result Value Ref Range    POC Glucose 121 (H) 60 - 115 mg/dl    Performed on ACCU-CHEK    POCT Glucose    Collection Time: 04/06/21  9:14 PM   Result Value Ref Range    POC Glucose 136 (H) 60 - 115 mg/dl    Performed on ACCU-CHEK    POCT Glucose    Collection Time: 04/07/21  8:03 AM   Result Value Ref Range    POC Glucose 180 (H) 60 - 115 mg/dl    Performed on ACCU-CHEK        ECG:     Sinus rhythm with 1st degree AV block  Anteroseptal infarct , age undetermined / PRWP  NSST & T wave changes. Samuel Rico MD  [facility] Cardiologist      Electronically signed on 4/6/21 at 9:51 AM EDT      -----    Subjective  PCP: [facility]   Subjective:   02/24/2021 - Erica Rodgers was seen in cardiac evaluation at the [facility] office 02/24/2021. The patients problems are listed in the impression below. Electronic medical records reviewed. Patient returns. She had 1 tooth extracted without issues recently. She feels well overall. She has had no further syncope. She denies any chest pain. She is active overall without limitations. Patient denies Chest Pain, SOB, Lightheadedness, Dizziness, TIA or CVA symptoms. No CHF or Edema. No Palpitations. No GI,  or Bleeding Issues. No Recent Fever or Chills. Cardiovascular and general review of systems is otherwise negative. A 14-system review is otherwise negative, other than noted. ELECTROCARDIOGRAM: Sinus rhythm, poor R wave anterior progression. Nonspecific ST-T wave changes. Cannot exclude prior microinfarction no acute changes. Rate 60. CARDIAC TESTING: None    LABORATORY DATA: Chem-7, CBC, liver function test, magnesium, TSH normal except for glucose 186, creatinine 1.16, GFR 47. Cholesterol 127, triglyceride 129, HDL 37, LDL 64. Hs-CRP 13.9. Otherwise as noted below. All above testing was personally reviewed. PHYSICAL EXAMINATION:   General: No acute distress. Vital signs as noted. Alert and oriented. Head And Neck Examination: No jugular venous distention, no carotid bruits, no mass. Carotid upstrokes preserved. Oral mucosa moist. No xanthelasma. Head and neck examination otherwise unremarkable. Lungs: Clear to auscultation and percussion. No wheezes, no rales, and no rhonchi. Chest: Excursion appeared to be normal. No chest wall tenderness on palpation. Lateral incisional scar. Heart: Normal S1 and S2. No S3. No S4. No rub. Grade 2/6 systolic murmur, best heard at the left sternal border. Point of maximal impulse was within normal limits. Abdomen: Soft. Nontender. No organomegaly. No bruits. No masses. Obese. Extremities: No bipedal edema. No clubbing. No cyanosis. Pulses are diminished throughout. No bruits. Right lower extremity foot/toe infection. Musculoskeletal Exam: No ulcers, otherwise unremarkable. Neuro: Neurologically appeared grossly intact. IMPRESSION:     Cardiovascular status stable  Chest pain, reproducible, musculoskeletal, noncardiac, resolved. CAD post CABG June 2019 [facility], LIMA to LAD, PCI to left circumflex and right coronary artery, last April 2020. Ischemic cardiomyopathy with apical hypokinesia LVEF 50%. PVOD post right lower extremity revascularization,  and [facility] Dr Gema Kim, (details not available), Right third toe amputation site infection. Carotid artery disease with ultrasound noted 50-69% bilateral stenoses May 2019. Syncope, presyncope  History of heart failure  Hypothyroidism  Hypertension  Hyperlipidemia  Diabetes  Depression  Schizophrenia  Family history of coronary artery disease  Multiple allergies as noted  Otherwise as per assessment below. RECOMMENDATIONS:     Patient appears to be doing well at this time.  Would suggest that she continue her current medications and treatment. Medication refills were provided. Exercise dietary weight reduction program was encouraged. Hydration. Follow my health portal was encouraged. She will follow-up with [facility] dentistry for her general care and psychiatric care. We will plan to see back in 2 months with Laboratory Studies and ECG as noted below. Patient will follow up with their primary physician for general care. The patient knows to contact medical care earlier if need be. Kellie Aldana MD, [address] [facility] Cardiology      Of Note:  TIME PLUS Q voice recognition dictation software was utilized partially in the preparation of this note, therefore, inaccuracies in spelling, word choice and punctuation may have occurred which were not recognized the time of signing. Chief Complaint  Cardiology Reason for Visit_NOH: follow up, [employer]-Ar[employer] verification        Active Problems   1. Abnormal EKG (794.31) (R94.31)   2. CAD in native artery (414.01) (I25.10)   · Post CABG LIMA LAD 6/19  DTW,      post LCX PCI 4/20.   3. Carotid atherosclerosis (433.10) (I65.29)   · US Biolateral 50-69% Dz 6/19   4. CKD (chronic kidney disease), stage III (585.3) (N18.30)   5. Depression (311) (F32.9)   6. Diabetes (250.00) (E11.9)   7. HTN (hypertension) (401.9) (I10)   8. Hyperlipidemia (272.4) (E78.5)   9. Hypothyroidism (244.9) (E03.9)   10. Ischemic cardiomyopathy (414.8) (I25.5)   · 50% with ANT Moderate HK. 11. Mitral regurgitation (424.0) (I34.0)   · 3+   12. Morbid obesity (278.01) (E66.01)   13. Never a smoker   14. PVD (peripheral vascular disease) (443.9) (I73.9)   · Post RIght Bypass 6/19  DTW   15. Schizo-affective psychosis (295.70) (F25.9)    Family History   1. Family history of diabetes mellitus (V18.0) (Z83.3) : Mother, Sister   2. Family history of Hodgkin's lymphoma (V16.7) (Z80.7) : Father   3. Family history of hypertension (V17.49) (Z82.49) :  Mother, Father, Sister, Brother   4. Family history of myocardial infarction (V17.3) (Z82.49) : Father   5. Family history of Lupus : Sister    Social History   · Daily caffeine consumption, 1 serving a day   · Never a smoker   · No alcohol use   · No illicit drug use    Current Meds   1. Albuterol Sulfate  MCG/ACT AERS; INHALE 1 PUFF EVERY 4 HOURS AS   NEEDED; Therapy: (Karen Méndez) to Recorded   2. Amoxicillin 500 MG Oral Capsule; TAKE 1 CAPSULE 3 TIMES DAILY UNTIL GONE;   Therapy: 25CDY8791 to Recorded   3. Aspirin 81 MG Oral Tablet Delayed Release; TAKE 1 TABLET DAILY  Requested for:   01Dec2020; Last Rx:[id number] Ordered   4. Atorvastatin Calcium 40 MG Oral Tablet; take 2 tablets once daily  Requested for:   02Jun2020; Last Rx:01Jun2020 Ordered   5. Brilinta 90 MG Oral Tablet; TAKE 1 TABLET TWICE DAILY; Therapy: 02Mui5449 to (Darrian Martinez)  Requested for: 60LQA6048; Last   Rx:02Jun2020 Ordered   6. Furosemide 40 MG Oral Tablet; Take one tab daily; Therapy: 02Iyn5169 to (Seretha Sktravis)  Requested for: 01Dec2020; Last   Rx:01Dec2020 Ordered   7. Haloperidol 5 MG Oral Tablet; TAKE 1 TABLET TWICE DAILY; Therapy: (Karen Méndez) to Recorded   8. HumaLOG 100 UNIT/ML Subcutaneous Solution Cartridge; INJECT SUBCUTANEOUSLY   AS DIRECTED; Therapy: (Recorded:22Jan2021) to Recorded   9. hydrALAZINE HCl - 50 MG Oral Tablet; TAKE 1 TABLET 3 TIMES DAILY; Therapy: 28Apr2020 to (Darrian Martinez)  Requested for: 75AEF1080; Last   Rx:01Jun2020 Ordered   10. HYDROcodone-Acetaminophen 5-325 MG Oral Tablet; TAKE 1 TABLET EVERY 4 TO 6    HOURS AS NEEDED FOR PAIN;    Therapy: 21Jan2021 to Recorded   11. hydrOXYzine HCl - 25 MG Oral Tablet; TAKE 1 TABLET 3 TIMES DAILY AS NEEDED; Therapy: (Karen Méndez) to Recorded   12. Ibuprofen 800 MG Oral Tablet; TAKE 1 TABLET EVERY 8 HOURS AS NEEDED; Therapy: 83AKL9855 to Recorded   13.  Isosorbide Dinitrate 20 MG Oral Tablet; TAKE 1 TABLET BY MOUTH EVERY 8 HOURS; Therapy: 42Lvi4464 to (Evaluate:36Uvs8115)  Requested for: 48Ecd3094; Last    Rx:[id number] Ordered   14. Lantus 100 UNIT/ML Subcutaneous Solution; INJECT SUBCUTANEOUSLY AS    DIRECTED; Therapy: (Yaneth Pen) to Recorded   15. Levothyroxine Sodium 50 MCG Oral Tablet; 1 TABLET DAILY; Therapy: 32Gar1854 to Recorded   16. Meclizine HCl - 25 MG Oral Tablet; TAKE 1 TABLET 3 TIMES DAILY AS NEEDED; Therapy: (Yaneth Pen) to Recorded   17. Metoprolol Succinate ER 50 MG Oral Tablet Extended Release 24 Hour; TAKE 1 TABLET    TWICE A DAY  Requested for: 13ROJ6916; Last Rx:[id number] Ordered   18. Montelukast Sodium 10 MG Oral Tablet; TAKE 1 TABLET AT BEDTIME; Therapy: (Yaneth Pen) to Recorded   19. Nitroglycerin 0.4 MG Sublingual Tablet Sublingual; PLACE 1 TABLET UNDER THE    TONGUE EVERY 5 MINUTES FOR UP TO 3 DOSES AS NEEDED FOR CHEST    PAIN. CALL 911 IF PAIN PERSISTS  Requested for: 75Aue1868; Last Rx:[id number]    Ordered   20. Penicillin V Potassium 500 MG Oral Tablet; TAKE 1 TABLET 4 TIMES DAILY UNTIL GONE;    Therapy: 82KXN3328 to Recorded   21. Prazosin HCl - 2 MG Oral Capsule; TAKE 1 CAPSULE EVERY 12 HOURS DAILY; Therapy: (Yaneth Pen) to Recorded   22. Sertraline HCl - 100 MG Oral Tablet; TAKE 2 TABLETS DAILY; Therapy: (Yaneth Pen) to Recorded    did not bring a list nor bottles from home, unable to completely verify medications . LUCA Avendaño LPN     Allergies   1. Ambien TABS   nervousness; Insomnia; Recorded By: Kaiden Clarkat; 04/17/2019 3:02:20 PM   2. Capoten TABS   Cough; Palpitations; Recorded By: Kaiden Clarkat; 04/17/2019 3:02:20 PM   3. Cogentin   Palpitations; Recorded By: Kaiden Clarkat; 04/17/2019 3:02:20 PM   4. Geodon CAPS   vision problems; Sleeplessness; Recorded By: Kaiden Clarkat; 04/17/2019 3:02:20 PM   5. KlonoPIN TABS   Palpitations; Recorded By: Kaiden Clarkat; 04/17/2019 3:02:20 PM   6.  Navane CAPS   Headache; Recorded By: Kaiden Clarkat; 04/17/2019 3:02:20 PM   7. Remeron   Nausea; Vomiting; Recorded By: Gilmar Velásquez; 04/17/2019 3:02:20 PM   8. RisperDAL TABS   Nausea; Recorded By: Gilmar Velásquez; 04/17/2019 3:02:20 PM   9. SEROquel TABS   Palpitations; Recorded By: Gilmar Velásquez; 04/17/2019 3:02:20 PM   10. Abilify   Recorded By: Kaykay Boudreaux; 11/08/2019 11:02:43 AM   11. Depakote ER TB24   unsteady, falls; Recorded By: Gilmar Velásquez; 04/17/2019 3:02:20 PM   12. Effexor TABS   patient states she felt high on medication; Recorded By: Gilmar Velásquez; 04/17/2019 3:02:20 PM    Immunizations  FLU --- Mohinder Pontiff: [id number] Aliment: 01-Oct-2020   PCV --- Series1: 01-Oct-2019     Vitals  Vital Signs   Recorded: 34Lqs6156 02:46PM   Height: 5 ft 1 in  Weight: 243 lb   BMI Calculated: 45.91 kg/m2  BSA Calculated: 2.05  Heart Rate: 68, Apical  Blood Pressure: 122 / 56, LUE, Sitting  Recorded: 78Ohw1408 02:44PM   Height: 5 ft 1 in  Weight: 243 lb   BMI Calculated: 45.91 kg/m2  BSA Calculated: 2.05  Falls Screening: Two or more falls within the past year    Procedure    EKG done in office today; :   .     Assessment   1. CAD in native artery (414.01) (I25.10)   2. PVD (peripheral vascular disease) (443.9) (I73.9)   3. Abnormal EKG (794.31) (R94.31)   4. HTN (hypertension) (401.9) (I10)   5. Hyperlipidemia (272.4) (E78.5)   6. Hypothyroidism (244.9) (E03.9)   7. Ischemic cardiomyopathy (414.8) (I25.5)   8. Mitral regurgitation (424.0) (I34.0)   9. Diabetes (250.00) (E11.9)   10. CKD (chronic kidney disease), stage III (585.3) (N18.30)   11. Carotid atherosclerosis (433.10) (I65.29)   12. Depression (311) (F32.9)   13. Schizo-affective psychosis (295.70) (F25.9)   14. Morbid obesity (278.01) (E66.01)   15. Never a smoker    Plan   1. Start: Atorvastatin Calcium 80 MG Oral Tablet; TAKE 1 TABLET AT BEDTIME   2.  Renew: Aspirin 81 MG Oral Tablet Delayed Release; TAKE 1 TABLET DAILY   3. Renew: Brilinta 90 MG Oral Tablet; TAKE 1 TABLET TWICE DAILY   4. Renew: Furosemide 40 MG Oral Tablet; Take one tab daily   5. Renew: hydrALAZINE HCl - 50 MG Oral Tablet; TAKE 1 TABLET 3 TIMES DAILY   6. Renew: Isosorbide Dinitrate 20 MG Oral Tablet; TAKE 1 TABLET BY MOUTH EVERY 8   HOURS   7. Renew: Metoprolol Succinate ER 50 MG Oral Tablet Extended Release 24 Hour; TAKE 1   TABLET TWICE A DAY   8. Renew: Nitroglycerin 0.4 MG Sublingual Tablet Sublingual; PLACE 1 TABLET UNDER   THE TONGUE EVERY 5 MINUTES FOR UP TO 3 DOSES AS NEEDED FOR   CHEST PAIN. CALL 911 IF PAIN PERSISTS   9. 12 Lead EKG; Status:Hold For - Exact Date; Requested for:[id number] EKG DONE IN   OFFICE TODAY;    10. EKG at next office visit.; Status:Active; Requested for:[id number];    11. Lifestyle education regarding diet; Status:Complete;   Done: [id number]   12. Tobacco Use Screening; Status:Complete;   Done: [id number]   13. 2 month follow-up/call if interval problems. Evaluation and Treatment  Follow-up  Status:    Active  Requested for: [id number]   14. Follow up per family physician. Evaluation and Treatment  Follow-up  Status: Active     Requested for: [id number]   15. Access your medical record, request prescriptions, view laboratory and testing done in    our office, request appointments, view immunization records and message your provider    through our safe and secure patient portal. Ask a staff member how    to register or visit us at [email or web address] to get started today.;    Status:Complete;   Done: [id number]   16. Drink plenty of fluids.; Status:Active; Requested for:78Iob6762;    17. Please bring all medicines, vitamins, and herbal supplements with you when you come    to the office.; Status:Active; Requested for:[id number];    18. Please bring any lab results from other providers/physicians to your next appointment.;    Status:Active;  Requested for:[id number];    19. Prescriptions will not be filled unless you are compliant with your follow up appointments    or have a follow up appointments scheduled as per the instruction of your physician. Refills for medications should be requested at the time of your office visit.; Status:Active; Requested for:83Juu3991;    20. Thank you for being a patient at [facility]. Our goal is to    provide high quality medical care. Following today's visit, please check your email for an    invitation to complete our patient satisfaction survey. By sharing your feedback, you will    help us continue our mission to provide excellent medical care. Your participation in the    survey is voluntary and completely confidential. We appreciate your thoughts regarding    today's visit.; Status:Active; Requested for:[id number];    21. There are many exercise options for seniors.; Status:Active; Requested for:33Yun0502;    22. Tobacco use Hx Reported; Status:Complete;   Done: 69AQM4111   23. You are overweight. Please increase activity level and reduce calorie intake. Please    inform the staff if you are willing to go for dietary counseling; Status:Active;  Requested    for:[id number];     Signatures  Electronically signed by : Obie Liz LPN; Feb 24 2100  4:74QS EST                        Electronically signed by : Mp Pritchett LPN; Feb 24 4343  3:21TU EST                        Electronically signed by : Austin Jordan M.D.; Feb 25 2021  2:35PM EST

## 2021-04-07 NOTE — PROGRESS NOTES
Pt up to bathroom with rollator then up to chair for lunch. Tolerating well no chest pain or SOB noted.

## 2021-04-07 NOTE — DISCHARGE SUMMARY
Discharge Summary    Date: 4/7/2021  Patient Name: Ozzy Jaramillo YOB: 1955 Age: 63 y.o. Admit Date: 4/6/2021  Discharge Date: 4/7/2021  Discharge Condition: [address]    Admission Diagnosis  NSTEMI (non-ST elevated myocardial infarction) ([address] (I21.4)     Discharge Diagnosis  Active Problems: NSTEMI (non-ST elevated myocardial infarction) ([facility])Resolved Problems: * No resolved hospital problems. [facility] Stay  Narrative of Hospital Course:  Pt admitted for Chest pain and elevated trop, was started on heparin and nitro drip for CP as per cardiology recommendation for possible NSTEMI, s/p cardiac cath no stent placemnt, CP atypical.     Consultants:  IP CONSULT TO CARDIOLOGYIP CONSULT TO CRITICAL CARE    Surgeries/procedures Performed:       Treatments:           Discharge Plan/Disposition:  Home    Hospital/Incidental Findings Requiring Follow Up:    Patient Instructions:    Diet: Cardiac Diet    Activity:  For number of days (if applicable): Other Instructions:    Provider Follow-Up:   No follow-ups on file.      Significant Diagnostic Studies:    Recent Labs:  Admission on 04/06/2021Ventricular Rate                          Date: 04/06/2021Value: 70          Ref range: BPM                Status: FinalAtrial Rate                                   Date: 04/06/2021Value: 70          Ref range: BPM                Status: FinalP-R Interval                                  Date: 04/06/2021Value: 212         Ref range: ms                 Status: FinalQRS Duration                                  Date: 04/06/2021Value: 98          Ref range: ms                 Status: FinalQ-T Interval                                  Date: 04/06/2021Value: 398         Ref range: ms                 Status: FinalQTc Calculation (Bazett)                      Date: 04/06/2021Value: 429         Ref range: ms                 Status: FinalP Axis                                        Date: 04/06/2021Value: 45 Ref range: degrees            Status: FinalR Axis                                        Date: 04/06/2021Value: 80          Ref range: degrees            Status: FinalT Axis                                        Date: 04/06/2021Value: 254         Ref range: degrees            Status: FinalPro-BNP                                       Date: 04/06/2021Value: 5,970       Ref range: pg/mL              Status: Final              Comment: NT-pro BNP ACUTE Interpretive Guidelines:      Age        Cutoff for Heart Failure   Less than 50 yrs   450 pg/mL   50-75 yrs           900 pg/mL   Greater than 75 yrs  1800 pg/mLNT-pro BNP NON-ACUTE Interpretive Guidelines:    Age                 Reference Range  Less than 74 yrs   0-125 pg/mL  Greater than 74 yrs   0-450 pg/mLOther possible causes of an elevated NT-proBNP include:cardiac ischemia, acute coronary syndrome, COPD, pneumonia,atrial fibrillation, pulmonary emboli, pulmonary hypertension,pericarditisReference:RAJ Douglas et al. NT-proBNP testing for diagnosis andshort-term prognosis in acute destabilized HF: an internationalpooled analysis of 1256 patients.  Heart Journal.2006;27:330-337aPTT                                          Date: 04/06/2021Value: 26.7        Ref range: 24.4 - 36.8 sec    Status: Final              Comment: Effective 11/4/2020:Heparin Therapeutic Range: 64.0  98.0 seconds. Protime                                       Date: 04/06/2021Value: 13.6        Ref range: 12.3 - 14.9 sec    Status: FinalINR                                           Date: 04/06/2021Value: 1.0           Status: FinalSodium                                        Date: 04/06/2021Value: 135         Ref range: 135 - 144 mEq/L    Status: FinalPotassium                                     Date: 04/06/2021Value: 4.5         Ref range: 3.4 - 4.9 mEq/L    Status: FinalChloride                                      Date: 04/06/2021Value: 95          Ref range: 95 - 107 mEq/L Status: Javier Anders                                           Date: 04/06/2021Value: 27          Ref range: 20 - 31 mEq/L      Status: FinalAnion Gap                                     Date: 04/06/2021Value: 13          Ref range: 9 - 15 mEq/L       Status: Corrected              Comment: Corrected result; previously reported as 7 on 04/06/2021 at 03:06 by [facility]                                       Date: 04/06/2021Value: 223*        Ref range: 70 - 99 mg/dL      Status: FinalBUN                                           Date: 04/06/2021Value: 52*         Ref range: 8 - 23 mg/dL       Status: FinalCREATININE                                    Date: 04/06/2021Value: 1.30*       Ref range: 0.50 - 0.90 mg/dL  Status: FinalGFR Non-                      Date: 04/06/2021Value: 41.3*       Ref range: >60                Status: Final              Comment: >60 mL/min/1.73m2 EGFR, calc. for ages 25 and older using theMDRD formula (not corrected for weight), is valid for stablerenal function. GFR                           Date: 04/06/2021Value: 50.0*       Ref range: >60                Status: Final              Comment: >60 mL/min/1.73m2 EGFR, calc. for ages 25 and older using theMDRD formula (not corrected for weight), is valid for stablerenal function. Calcium                                       Date: 04/06/2021Value: 8.9         Ref range: 8.5 - 9.9 mg/dL    Status: FinalTotal Protein                                 Date: 04/06/2021Value: 6.9         Ref range: 6.3 - 8.0 g/dL     Status: FinalAlbumin                                       Date: 04/06/2021Value: 3.6         Ref range: 3.5 - 4.6 g/dL     Status: FinalTotal Bilirubin                               Date: 04/06/2021Value: 0.3         Ref range: 0.2 - 0.7 mg/dL    Status: FinalAlkaline Phosphatase                          Date: 04/06/2021Value: 87          Ref range: 40 - 130 U/L       Status: FinalALT FinalBasophils %                                   Date: 04/06/2021Value: 0.4         Ref range: %                  Status: FinalNeutrophils Absolute                          Date: 04/06/2021Value: 6.6*        Ref range: 1.4 - 6.5 K/uL     Status: FinalLymphocytes Absolute                          Date: 04/06/2021Value: 2.7         Ref range: 1.0 - 4.8 K/uL     Status: FinalMonocytes Absolute                            Date: 04/06/2021Value: 0.9*        Ref range: 0.2 - 0.8 K/uL     Status: FinalEosinophils Absolute                          Date: 04/06/2021Value: 0.2         Ref range: 0.0 - 0.7 K/uL     Status: FinalBasophils Absolute                            Date: 04/06/2021Value: 0.0         Ref range: 0.0 - 0.2 K/uL     Status: FinalAnisocytosis                                  Date: 04/06/2021Value: 1+            Status: FinalMicrocytes                                    Date: 04/06/2021Value: 1+            Status: FinalHypochromia                                   Date: 04/06/2021Value: 1+            Status: FinalOvalocytes                                    Date: 04/06/2021Value: 1+            Status: FinalMagnesium                                     Date: 04/06/2021Value: 2.2         Ref range: 1.7 - 2.4 mg/dL    Status: FinalTroponin                                      Date: 04/06/2021Value: 0.310*      Ref range: 0.000 - 0.010 ng*  Status: Final              Comment: Methodology by Troponin T. Color, UA                                     Date: 04/06/2021Value: Yellow      Ref range: Straw/Yellow       Status: FinalClarity, UA                                   Date: 04/06/2021Value: Clear       Ref range: Clear              Status: FinalGlucose, Ur                                   Date: 04/06/2021Value: Negative    Ref range: Negative mg/dL     Status: FinalBilirubin Urine                               Date: 04/06/2021Value: Negative    Ref range: Negative           Status: FinalKetones, Urine Date: 04/06/2021Value: Negative    Ref range: Negative mg/dL     Status: FinalSpecific Gravity, UA                          Date: 04/06/2021Value: 1.015       Ref range: 1.005 - 1.030      Status: FinalBlood, Urine                                  Date: 04/06/2021Value: Negative    Ref range: Negative           Status: FinalpH, UA                                        Date: 04/06/2021Value: 5.5         Ref range: 5.0 - 9.0          Status: FinalProtein, UA                                   Date: 04/06/2021Value: Negative    Ref range: Negative mg/dL     Status: FinalUrobilinogen, Urine                           Date: 04/06/2021Value: 0.2         Ref range: <2.0 E.U./dL       Status: FinalNitrite, Urine                                Date: 04/06/2021Value: Negative    Ref range: Negative           Status: FinalLeukocyte Esterase, Urine                     Date: 04/06/2021Value: Negative    Ref range: Negative           Status: FinalUrine Reflex to Culture                       Date: 04/06/2021Value: Not Indicated                     Status: FinalTotal CK                                      Date: 04/06/2021Value: 111         Ref range: 0 - 170 U/L        Status: [address]                                           Date: 04/06/2021Value: 1.390       Ref range: 0.440 - 3.860 uI*  Status: FztzdXNQN-XjE-4, NAAT                              Date: 04/06/2021Value: Not Detected                   Ref range: Not Detected       Status: Final              Comment: Rapid NAAT:   Negative results should be treated as presumptive and,if inconsistent with clinical signs and symptoms or necessary forpatient management, should be tested with an alternative molecularassay. Negative results do not preclude SARS-CoV-2 infection andshould not be used as the sole basis for patient management decisions. This test has been authorized by the FDA under an Emergency UseAuthorization (EUA) for use by authorized laboratories. Fact sheet for Healthcare [email or web address] sheet for Patients: [email or web address]: Isothermal Nucleic Acid AmplificationD-Dimer, Quant                                Date: 04/06/2021Value: 0.42        Ref range: 0.00 - 0.50 mg/L*  Status: Final              Comment: VTE (DVT or PE) cut-off = 0.50 mg/L FEUWBC                                           Date: 04/06/2021Value: 10.9*       Ref range: 4.8 - 10.8 K/uL    Status: FinalRBC                                           Date: 04/06/2021Value: 4.70        Ref range: 4.20 - 5.40 M/uL   Status: FinalHemoglobin                                    Date: 04/06/2021Value: 11.6*       Ref range: 12.0 - 16.0 g/dL   Status: FinalHematocrit                                    Date: 04/06/2021Value: 34.8*       Ref range: 37.0 - 47.0 %      Status: FinalMCV                                           Date: 04/06/2021Value: 74.1*       Ref range: 82.0 - 100.0 fL    Status: [address] [place]                                           Date: 04/06/2021Value: 24.6*       Ref range: 27.0 - 31.3 pg     Status: [address]                                          Date: 04/06/2021Value: 33.2        Ref range: 33.0 - 37.0 %      Status: FinalRDW                                           Date: 04/06/2021Value: 19.1*       Ref range: 11.5 - 14.5 %      Status: FinalPlatelets                                     Date: 04/06/2021Value: 238         Ref range: 130 - 400 K/uL     Status: FinalAnti-XA Unfrac Heparin                        Date: 04/06/2021Value: 0.97        Ref range: IU/mL              Status: Final              Comment: Unfractionated Heparin Therapeutic Range:0.30 - 0.70 IU/mLTroponin                                      Date: 04/06/2021Value: 0.320*      Ref range: 0.000 - 0.010 ng*  Status: Final              Comment: Methodology by Troponin T. Troponin                                      Date: 04/06/2021Value: 0.335*      Ref range: 0.000 - 0.010 ng*  Status: Final              Comment: Methodology by Troponin T. POC Glucose                                   Date: 04/06/2021Value: 157*        Ref range: 60 - 115 mg/dl     Status: FinalPerformed on                                  Date: 04/06/2021Value: ACCU-CHEK     Status: [address] Glucose                                   Date: 04/06/2021Value: 121*        Ref range: 60 - 115 mg/dl     Status: FinalPerformed on                                  Date: 04/06/2021Value: ACCU-CHEK     Status: [address] Glucose                                   Date: 04/06/2021Value: 136*        Ref range: 60 - 115 mg/dl     Status: FinalPerformed on                                  Date: 04/06/2021Value: ACCU-CHEK     Status: [address] Glucose                                   Date: 04/07/2021Value: 180*        Ref range: 60 - 115 mg/dl     Status: FinalPerformed on                                  Date: 04/07/2021Value: ACCU-CHEK     Status: Final------------    Radiology last 7 days:  Xr Chest PortableResult Date: 4/6/2021STABLE CARDIOMEGALY WITH RADIOGRAPHIC SIGNS OF LEFT VENTRICULAR ENLARGEMENT, UNCHANGED.     [unfilled]    Discharge Medications    Current Discharge Medication ListSTART taking these medicationsranolazine (RANEXA) 500 MG extended release tabletTake 1 tablet by mouth 2 times dailyQty: 60 tablet Refills: 3    Current Discharge Medication List    Current Discharge Medication ListCONTINUE these medications which have NOT CHANGEDalbuterol (PROVENTIL) (2.5 MG/3ML) 0.083% nebulizer solutionTake 3 mLs by nebulization every 6 hours as needed for [facility]: 120 each Refills: 5Associated Diagnoses:Mild intermittent asthma without complicationalbuterol sulfate HFA (VENTOLIN HFA) 108 (90 Base) MCG/ACT inhalerInhale 2 puffs into the lungs as needed for Wheezing Every 4-6 hours PRNQty: 1 Inhaler Refills: 5Associated Diagnoses:Mild intermittent asthma without complicationmontelukast (SINGULAIR) 10 MG tabletTake 1 tablet by mouth nightlyQty: 30 tablet Refills: 5Associated Diagnoses:Mild intermittent asthma without complicationlevothyroxine (SYNTHROID) 50 MCG tablettake 1 tablet by mouth once dailyQty: 30 tablet Refills: 5insulin glargine (LANTUS SOLOSTAR) 100 UNIT/ML injection pen50 units at bedtimeQty: 15 pen Refills: 3Dulaglutide (TRULICITY) 0.94 VH/4.6QU SOPNInject 0.75 mg into the skin once a weekQty: 4 pen Refills: 3oxybutynin (DITROPAN-XL) 5 MG extended release tablettake 1 tablet by mouth once dailyQty: 90 tablet Refills: 3atorvastatin (LIPITOR) 40 MG tabletTake 1 tablet by mouth nightlyQty: 30 tablet Refills: 3docusate sodium (COLACE, DULCOLAX) 100 MG CAPSTake 100 mg by mouth 2 times dailyQty: 60 capsule Refills: 1prazosin (MINIPRESS) 2 MG capsuleTake 2 mg by mouth nightly sertraline (ZOLOFT) 100 MG tabletTake 200 mg by mouth daily furosemide (LASIX) 40 MG tabletTake 1 tablet by mouth dailyQty: 60 tablet Refills: 3ticagrelor (BRILINTA) 90 MG TABS tabletTake 90 mg by mouth 2 times dailyCPAP Machine MISCby Does not apply route New CPAP with 10 cmQty: 1 each Refills: 0Associated Diagnoses:JESICA (obstructive sleep apnea)aspirin 81 MG EC tabletTake 1 tablet by mouth dailyQty: 30 tablet Refills: 5OPYATI0 lit O2 with sleep , please give O2 concentratorQty: 1 Units Refills: 0hydrOXYzine (VISTARIL) 25 MG capsuleTake 50 mg by mouth 3 times daily as needed insulin glargine (LANTUS) 100 UNIT/ML injection vialInject 30 Units into the skin nightlyQty: 1 vial Refills: 3isosorbide dinitrate (ISORDIL) 20 MG tabletTake 1 tablet by mouth 3 times dailyQty: 90 tablet Refills: 3nitroGLYCERIN (NITROSTAT) 0.4 MG SL tabletup to max of 3 total doses. If no relief after 1 dose, call 911. Qty: 25 tablet Refills: 3hydrALAZINE (APRESOLINE) 50 MG tabletTake 1 tablet by mouth every 8 hoursQty: 90 tablet Refills: 3metoprolol succinate (TOPROL XL) 50 MG extended release tabletTake 1 tablet by mouth 2 times dailyQty: 30 tablet Refills: 3amitriptyline (ELAVIL) 25 MG tabletTake 25 mg by mouth nightlygabapentin (NEURONTIN) 400 MG capsuleTake 400 mg by mouth 2 times daily. AM and 800 mg in pm-9pmhaloperidol (HALDOL) 5 MG tabletTake 5 mg by mouth dailyfolic acid (FOLVITE) 1 MG tabletTake 1 mg by mouth dailyIron Polysacch Uvslg-K75-XY (NIFEREX-150 FORTE PO)Take 1 tablet by mouth daily Cyanocobalamin (VITAMIN B-12) 1000 MCG extended release tabletTake 1,000 mcg by mouth dailymeclizine (ANTIVERT) 25 MG tabletTake 25 mg by mouth three times daily!! Continuous Blood Gluc Sensor (FREESTYLE FELY 14 DAY SENSOR) MISCEvery 2 weeksQty: 2 each Refills: 06Alcohol Swabs (ALCOHOL PREP) 70 % PADSqidQty: 300 each Refills: 06Insulin Pen Needle (NOVOFINE) 32G X 6 MM MISCqidQty: 300 each Refills: 3insulin lispro, 1 Unit Dial, (HUMALOG KWIKPEN) 100 UNIT/ML SOPN15  units at each mealsQty: 10 pen Refills: 03RESTASIS 0.05 % ophthalmic emulsionneomycin-polymyxin-dexameth (MAXITROL) 3.5-94884-6.1 ophthalmic suspensioninstill 1 drop into both eyes four times a dayARTIFICIAL TEARS 1.4 % ophthalmic solution!! Continuous Blood Gluc Sensor (FREESTYLE FELY 14 DAY SENSOR) MISCEvery 2 weeksQty: 2 each Refills: 06Continuous Blood Gluc  (FREESTYLE FELY 14 DAY READER) DEVIAs directedQty: 1 Device Refills: 00Emollient (EUCERIN INTENSIVE REPAIR HAND) 2.5-10 % CREAAPPLY TO FEET AT BEDTIME; PLACE SOCKS OVER FEET AFTER APPLICATION IF NEEDED FOR DRY CRACKING FEETNutritional Supplements (GLUCERNA SHAKE) LIQDtake as directed three times a dayFreeStyle Lancets MISC1 each by Does not apply route dailyQty: 100 each Refills: 3blood glucose test strips (FREESTYLE LITE) strip1 each by In Vitro route daily As needed. Qty: 100 each Refills: 3!! - Potential duplicate medications found. Please discuss with provider.     Current Discharge Medication ListSTOP taking these medicationsibuprofen (ADVIL;MOTRIN) 800 MG tabletComments:Reason for Stopping:    Time Spent on Discharge:E] minutes were spent in patient examination, evaluation, counseling as well as medication reconciliation, prescriptions for required medications, discharge plan, and follow up.     Electronically signed by Anabela Montanez MD on 4/7/21 at 11:07 AM EDT   overtime on dc summary was 45 min

## 2021-04-07 NOTE — PROGRESS NOTES
Physical Therapy Med Surg Initial Assessment  Facility/Department: [facility]  Room: [facility] [address]       NAME: Cristofer Banda  : 1955 (85 y.o.)  MRN: [id number]  CODE STATUS: Full Code    Date of Service: 2021    Patient Diagnosis(es): NSTEMI (non-ST elevated myocardial infarction) [facility]) [I21.4]   Chief Complaint   Patient presents with    Chest Pain     Patient Active Problem List    Diagnosis Date Noted    NSTEMI (non-ST elevated myocardial infarction) ([address] 2021    Stenosis of carotid artery 2021    Obesity (BMI 30-39.9) 2020    Heart failure, diastolic, with acute decompensation ([address] 2020    Nausea and vomiting     Abnormal gait 2020    Gangrene of toe of left foot ([address] 2020    Acute cerebrovascular accident ([address] 2020    Dizziness 2020    Acute on chronic combined systolic and diastolic congestive heart failure ([address] 2020    Nocturnal hypoxia 2020    RADHA (acute kidney injury) ([address] 2020    Severe mitral regurgitation 2020    Pulmonary hypertension ([address] 2020    Syncope and collapse 2020    Cellulitis 2020    Chest pain 2020    JESICA (obstructive sleep apnea)     Athscl native arteries of left leg w ulceration oth prt foot ([address] 2019    Rectal bleeding     Surgical wound dehiscence, initial encounter 2019    S/P amputation of lesser toe, right ([address] 2019    Ischemic myocardial dysfunction 2019    CAD in native artery 2019    Extrapyramidal disease 2019    Drug-induced hypotension 2019    Osteomyelitis of right foot ([address] 2019    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     Infection of skin 2019    Acute osteomyelitis ([address] 2019    Atherosclerotic PVD with intermittent claudication ([address] 2019    Peripheral vascular disease ([address] 2019    Non-pressure ulcer of toe ([facility] 2019    Anxiety 2019    Gastritis, unspecified, without bleeding 2019    Pure hypercholesterolemia 2019    Schizophrenia, unspecified ([facility] 2019    Mild intermittent asthma without complication     Type 2 diabetes mellitus with diabetic neuropathy, unspecified ([facility] 2019    Major depressive disorder, single episode, unspecified 2019    Type 2 diabetes mellitus with hyperglycemia, with long-term current use of insulin ([facility] 2019    HTN (hypertension) 2019    HLD (hyperlipidemia) 2019    Hypothyroid 2019    Congestive heart failure ([address] 2019    Secondary diabetes mellitus ([facility] 2019    Severe major depression with psychotic features [facility] 2019        Past Medical History:   Diagnosis Date    Asthma     CAD (coronary artery disease)     CKD (chronic kidney disease) stage 3, GFR 30-59 ml/min     Colitis     Diabetes mellitus ([address]     Hyperlipidemia     Hypertension     [facility]     Prolonged emergence from general anesthesia     PVD (peripheral vascular disease) ([facility]     Thyroid disease      Past Surgical History:   Procedure Laterality Date    CARDIAC SURGERY      CATARACT REMOVAL WITH IMPLANT Bilateral 11- 11-     SECTION      COLONOSCOPY N/A 2019    COLONOSCOPY DIAGNOSTIC performed by Oneil Lundborg, MD at [address] [place] [address] GRAFT  2019    unknown vessels    HYSTERECTOMY      TOE AMPUTATION Right     3rd toe       Chart Reviewed: Yes  Family / Caregiver Present: No    Restrictions:  Restrictions/Precautions: Contact Precautions     SUBJECTIVE:      Pain - 0/10       Post Treatment Pain Screening:   Pain Screening  Patient Currently in Pain: No  Pain Assessment  Pain Level: 0    Prior Level of Function:  Social/Functional History  Lives With: Spouse  Type of Home: Apartment(5th)  Home Layout: One level(laundry is on th efirst floor of the building)  Home Access: Elevator  Bathroom Shower/Tub: Tub/Shower unit, Curtain  Bathroom Equipment: Shower chair, Hand-held shower  Home Equipment: Standard walker  ADL Assistance: Independent  Homemaking Assistance: Independent  Ambulation Assistance: Independent  Transfer Assistance: Independent  Active : No    OBJECTIVE:   Vision: Within Functional Limits  Vision Exceptions: Wears glasses for reading  Hearing: Within functional limits    Cognition:  Follows Commands: Within Functional Limits( - thru [id number])         ROM:  RLE PROM: WFL  LLE PROM: WFL    Strength:  Strength RLE  Strength RLE: WFL  Strength LLE  Strength LLE: WFL    Neuro:  Balance  Posture: Good  Sitting - Static: Good  Sitting - Dynamic: Good  Standing - Static: Good(able to stand 2 min with no UE support and no LOB)  Standing - Dynamic: Good;-(with rollator no LOB noted - pt declined gait with no device)             Bed mobility  Rolling to Left: Independent  Rolling to Right: Independent  Supine to Sit: Independent  Sit to Supine: Independent  Scooting: Independent  Comment: HOB flat with no bed rails    Transfers  Sit to Stand: Supervision;Modified independent  Stand to sit: Supervision;Modified independent  Comment: indep with toilet transfer and toileting - no LOB with hand washing    Ambulation  Ambulation?: Yes  Ambulation 1  Surface: level tile  Device: Rollator  Assistance: Supervision  Quality of Gait: lat trunk sway with no LOB,decreased step length,  Distance: 15- 20 feet with multiple repetitions in room  Comments: pt declined further gait and gait attempt with no device.  Pt is in need of rollator for return to home at this time              Activity Tolerance  Activity Tolerance: Patient Tolerated treatment well;Patient limited by endurance  Activity Tolerance: pt declined further distance for gait          PT Education  PT Education: Goals;PT Role;Plan of Care  Patient Education: pt agrees to short term PT if in hospital    ASSESSMENT:   Body structures, Functions, Activity limitations: Decreased endurance;Decreased balance  Decision Making: Low Complexity  History: med  Exam: low  Clinical Presentation: low    Patient Education: pt agrees to short term PT if in hospital  Barriers to Learning: none    DISCHARGE RECOMMENDATIONS:  Discharge Recommendations: Continue to assess pending progress    Assessment: Pt demonstrates mild deficits inbalance. She is in need of a rollator for safe return to home. She would benefit from short term follwo up PT to ensure safety in gait for longer distances  REQUIRES PT FOLLOW UP: Yes      PLAN OF CARE:  Plan  Times per week: 1-2 visits  Current Treatment Recommendations: Gait Training, Balance Training, Endurance Training  Safety Devices  Type of devices: Left in bed, Call light within reach    Goals:  Short term goals  Short term goal 1: indep gait with rollator 50 feet    AMPA (6 CLICK) BASIC MOBILITY  AM-PAC Inpatient Mobility Raw Score : 23     Therapy Time:   Individual   Time In 1440   Time Out 1502   Minutes [facility] [address], 04/07/21 at 3:08 PM         Definitions for assistance levels  Independent = pt does not require any physical supervision or assistance from another person for activity completion. Device may be needed.   Stand by assistance = pt requires verbal cues or instructions from another person, close to but not touching, to perform the activity  Minimal assistance= pt performs 75% or more of the activity; assistance is required to complete the activity  Moderate assistance= pt performs 50% of the activity; assistance is required to complete the activity  Maximal assistance = pt performs 25% of the activity; assistance is required to complete the activity  Dependent = pt requires total physical assistance to accomplish the task

## 2021-04-07 NOTE — CARE COORDINATION
PT saw patient and states she needs rollator. Pt had said she had rollator yesterday but I did ask Dr. Asya Malave for script for her to take to pharmacy and they will get for her if she does not have one. She goes to [facility] where they speak Bulgarian. Epifanio Tom will instruct her with  with her other dc instructions. Pt did well with pt and ot on evals.

## 2021-04-07 NOTE — PROGRESS NOTES
Assisted up to chair for breakfast, denies chest pain or SOB. Cath insertion site dry and intact. Dressing removed. Pt back to bed after eating using rollator without assistance I stoodby.

## 2021-04-07 NOTE — PROGRESS NOTES
cervical nodes  Urology: No Colon   Psychiatric: appropriate    Medications:  Scheduled Meds:   sodium chloride  1,000 mL Intravenous Once    insulin lispro  0-6 Units Subcutaneous TID WC    insulin lispro  0-3 Units Subcutaneous Nightly    sodium chloride flush  10 mL Intravenous 2 times per day    ipratropium-albuterol  1 ampule Inhalation 4x daily    aspirin  81 mg Oral Daily    atorvastatin  40 mg Oral Nightly    vitamin B-12  1,000 mcg Oral Daily    docusate sodium  100 mg Oral BID    Dulaglutide  0.75 mg Subcutaneous Weekly    folic acid  1 mg Oral Daily    furosemide  40 mg Oral Daily    gabapentin  400 mg Oral BID    haloperidol  5 mg Oral Daily    hydrALAZINE  50 mg Oral 3 times per day    insulin glargine  50 Units Subcutaneous Nightly    insulin lispro  15 Units Subcutaneous TID     isosorbide dinitrate  20 mg Oral TID    levothyroxine  50 mcg Oral Daily    meclizine  25 mg Oral TID    metoprolol succinate  50 mg Oral BID    montelukast  10 mg Oral Nightly    oxybutynin  5 mg Oral Nightly    prazosin  2 mg Oral Nightly    sertraline  200 mg Oral Daily    ticagrelor  90 mg Oral BID    sodium chloride flush  10 mL Intravenous 2 times per day       PRN Meds:  morphine, sodium chloride flush, ipratropium-albuterol, albuterol, hydrOXYzine, nitroGLYCERIN, sodium chloride flush, acetaminophen, glucose, dextrose, glucagon (rDNA), dextrose    Results: reviewed by me   CBC:   Recent Labs     04/06/21 0200 04/06/21  0821   WBC 10.5 10.9*   HGB 11.7* 11.6*   HCT 35.2* 34.8*   MCV 74.6* 74.1*    238     BMP:   Recent Labs     04/06/21 0200      K 4.5   CL 95   CO2 27   BUN 52*   CREATININE 1.30*     LIVER PROFILE:   Recent Labs     04/06/21 0200   AST 20   ALT 16   BILITOT 0.3   ALKPHOS 87     PT/INR:   Recent Labs     04/06/21 0200   PROTIME 13.6   INR 1.0     APTT:   Recent Labs     04/06/21 0200   APTT 26.7     UA:  Recent Labs     04/06/21 0200   COLORU Yellow   PHUR 5. 5   CLARITYU Clear   SPECGRAV 1.015   LEUKOCYTESUR Negative   UROBILINOGEN 0.2   BILIRUBINUR Negative   BLOODU Negative   GLUCOSEU Negative       Cultures:  None  Films:  CXR reviewed by me and it showed cardiomegaly, no infiltrate      Assessment:   This is a critically ill patient      · Atypical chest pain, currently resolved  · Non-ST relation MI status post left heart cath chronic changes  · Obstructive sleep apnea  · History of asthma no signs of exacerbation  · Morbid obesity    Recommendations  · Continue cardioprotective medications, plan per cardiology  · Repeat renal function panel  · CPAP while asleep  · To 1 W. when okay with cardiology          Electronically signed by Nadege Killian MD,  [facility] ,on 4/7/2021 at 8:22 AM

## 2021-04-07 NOTE — PROGRESS NOTES
Uneventful shift. Monitor has been sinus rhythm without ectopy. Nitroglycerin drip weaned down to 30mcg/minute for mild hypotension.

## 2021-04-07 NOTE — PROGRESS NOTES
Discharge instruction reviewed with assistance from interpretor resources. Pt verbalized understanding of new medications at pharmacy as well as prescription for Rolator given to pt. Pt acknowledged and verbally stated she understood and had no further questions. Pt denies having chest pain or SOB upon discharge.

## 2021-04-07 NOTE — PROGRESS NOTES
[facility] OCCUPATIONAL THERAPY EVALUATION - ACUTE     NAME: Jak Harper  : 1955 (48 y.o.)  MRN: [id number]  CODE STATUS: Full Code  Room: [facility] [address]    Date of Service: 2021    Patient Diagnosis(es): NSTEMI (non-ST elevated myocardial infarction) [facility]) [I21.4]   Chief Complaint   Patient presents with    Chest Pain     Patient Active Problem List    Diagnosis Date Noted    NSTEMI (non-ST elevated myocardial infarction) ([facility] 2021    Stenosis of carotid artery 2021    Obesity (BMI 30-39.9) 2020    Heart failure, diastolic, with acute decompensation ([address] 2020    Nausea and vomiting     Abnormal gait 2020    Gangrene of toe of left foot ([address] 2020    Acute cerebrovascular accident ([address] 2020    Dizziness 2020    Acute on chronic combined systolic and diastolic congestive heart failure ([address] 2020    Nocturnal hypoxia 2020    RADHA (acute kidney injury) ([address] 2020    Severe mitral regurgitation 2020    Pulmonary hypertension ([address] 2020    Syncope and collapse 2020    Cellulitis 2020    Chest pain 2020    JESICA (obstructive sleep apnea)     Athscl native arteries of left leg w ulceration oth prt foot ([address] 2019    Rectal bleeding     Surgical wound dehiscence, initial encounter 2019    S/P amputation of lesser toe, right ([address] 2019    Ischemic myocardial dysfunction 2019    CAD in native artery 2019    Extrapyramidal disease 2019    Drug-induced hypotension 2019    Osteomyelitis of right foot ([address] 2019    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     Infection of skin 2019    Acute osteomyelitis ([address] 2019    Atherosclerotic PVD with intermittent claudication ([address] 2019    Peripheral vascular disease ([address] 2019    Non-pressure ulcer of toe ([facility] 2019    Anxiety 2019    Gastritis, unspecified, without bleeding 2019    Pure hypercholesterolemia 2019    Schizophrenia, unspecified ([facility] 2019    Mild intermittent asthma without complication     Type 2 diabetes mellitus with diabetic neuropathy, unspecified ([facility] 2019    Major depressive disorder, single episode, unspecified 2019    Type 2 diabetes mellitus with hyperglycemia, with long-term current use of insulin ([address] 2019    HTN (hypertension) 2019    HLD (hyperlipidemia) 2019    Hypothyroid 2019    Congestive heart failure ([address] 2019    Secondary diabetes mellitus ([facility] 2019    Severe major depression with psychotic features [facility] 2019        Past Medical History:   Diagnosis Date    Asthma     CAD (coronary artery disease)     CKD (chronic kidney disease) stage 3, GFR 30-59 ml/min     Colitis     Diabetes mellitus ([address]     Hyperlipidemia     Hypertension     [facility]peripheral [facility]) [facility]     Prolonged emergence from general anesthesia     PVD (peripheral vascular disease) ([facility]     Thyroid disease      Past Surgical History:   Procedure Laterality Date    CARDIAC SURGERY      CATARACT REMOVAL WITH IMPLANT Bilateral 11- 11-     SECTION      COLONOSCOPY N/A 2019    COLONOSCOPY DIAGNOSTIC performed by Maury Saha MD at [address] [place] [address] GRAFT  2019    unknown vessels    HYSTERECTOMY      TOE AMPUTATION Right     3rd toe        Restrictions  Restrictions/Precautions: Contact Precautions     Safety Devices: Safety Devices  Safety Devices in place: Yes  Type of devices: Left in bed, Call light within reach        Subjective:    Virtual  Aria Rubin #[id number] utilized  Pre Treatment Pain Screening  Pain at present: 0    Pain Reassessment:   Pain Assessment  Patient Currently in Pain: No       Prior Level of Function:  Social/Functional History  Lives With: Spouse socks while seated on the side of the bed without assist)  Toilet Transfers  Toilet Transfers Comments: Patient declined a need       Therapy key for assistance levels    Independent = Pt. is able to perform task with no assistance but may require a device   Stand by assistance = Pt. does not perform task at an independent level but does not need physical assistance, requires verbal cues  Minimal, Moderate, Maximal Assistance = Pt. requires physical assistance (25%, 50%, 75% assist from helper) for task but is able to actively participate in task   Dependent = Pt. requires total assistance with task and is not able to actively participate with task completion     Functional Mobility:  Functional Mobility  Functional Mobility Comments: Patient stood at the side of the bed with support from the ww.        Bed Mobility  Bed mobility  Rolling to Left: Modified independent  Supine to Sit: Modified independent  Sit to Supine: Modified independent  Scooting: Modified independent    Seated and Standing Balance:  Balance  Sitting Balance: Independent  Standing Balance: Independent    Functional Endurance:  Activity Tolerance  Activity Tolerance: Patient kept eyes closed for much of the eval but responded when asked questions and participated when asked    D/C Recommendations:  OT D/C RECOMMENDATIONS  REQUIRES OT FOLLOW UP: No    Equipment Recommendations:  OT Equipment Recommendations  Other: Spoke to care coordinator and mentioned that patient would benefit from raised toilet seat and that she would like a rollator to use at home    OT Education:   OT Education  Patient Education: educated patient that she can purchase a raised toilet seat at the local [address]    OT Follow Up:  OT D/C RECOMMENDATIONS  REQUIRES OT FOLLOW UP: No       Assessment/Discharge Disposition:     No Skilled OT: Independent with ADL's  Decision Making: Low Complexity  History: minimal chart review  Exam: no problems identified  Assistance /

## 2021-04-09 PROCEDURE — 93010 ELECTROCARDIOGRAM REPORT: CPT | Performed by: INTERNAL MEDICINE

## 2021-04-20 NOTE — PROGRESS NOTES
Physician Progress Note      PATIENT:               Kitty Piñaelor  CSN #:                  [id number]  :                       1955  ADMIT DATE:       2021 1:45 AM  DISCH DATE:        2021 5:58 PM  RESPONDING  PROVIDER #:        Anthony Mondragon MD          QUERY TEXT:    Dear Attending:    Patient admitted with NSTEMI. Noted documentation of NSTEMI Moderate Flat   Troponin elevation, Type 2 demand likely, on  21 per Dr. Heriberto Parsons   cardiologist consultant. If possible, please document in progress notes and   discharge summary:    The medical record reflects the following:  Risk Factors:  HTN CAD  chronic costochondritis chronic heart failure  Clinical Indicators: per Dr Yusef Linton / cardiology progress note 2021:   \"repeat cardiac catheterization yesterday by Dr. Genesis Caro with patent LIMA to the   LAD bypass, patent left circumflex stent, and occluded right coronary artery   with L>R Collateral for LIMA LAD. LVEF 50%. Attempted proximal LAD stenting   was not able to be performed due to inability to cross lesion with a balloo. .   NSTEMI, Moderate Flat Troponin elevation, Type 2 demand likely. \"  BNP 5970     troponin 0.310  0.320  0.335  Treatment: serial troponins  cardiology consult left heart catheterization   selective coronary angiography selective LIMA angiography left   ventriculography and PCI of the lesion at the ostium of the left anterior   descending artery was attempted, but unsuccessful    Thank you,  Tarun RAMOSN RN CDS  contact  M-F 6am-2pm w/ questions  Options provided:  -- NSTEMI confirmed present on admission  -- NSTEMI confirmed not present on admission  -- Defer to cardiology consultant documentation regarding NSTEMI  -- Other - I will add my own diagnosis  -- Disagree - Not applicable / Not valid  -- Disagree - Clinically unable to determine / Unknown  -- Refer to Clinical Documentation Reviewer    PROVIDER RESPONSE TEXT:    The diagnosis of NSTEMI was confirmed as present on admission.     Query created by: Jennifer Leroy on 4/13/2021 11:28 AM      Electronically signed by:  Arash Ward MD 4/19/2021 9:52 PM

## 2021-04-29 NOTE — OP NOTE
INDICATIONS:  The patient is a 63 y.o. female with multiple cardiac risk factors presented with clinical syndrome concerning for acute coronary syndrome non-ST segment elevation myocardial infarction. She is status post PCI and stenting of the left main and ostial left circumflex artery. She has multiple cardiac risk factors. PROCEDURE:  Left heart catheterization, coronary angiography, left ventriculography ,LV/Ao pull back and selective right common femoral angiography was performed. Balloon angioplasty of the ostial LAD was attempted. Benefits, alternatives and risks of the procedure were explained to the patient to include but not limited to MI, Stroke, Death, Allergic reaction to dye, bleeding, risk of kidney injury, and possible need for emergent coronary artery bypass grafting and informed consent was obtained. The patient was brought to the cath lab and was prepped and draped in the normal sterile technique. IV conscious sedation was maintained. 1% lidocaine was used for local anesthesia. DESCRIPTION OF PROCEDURE: Under local anesthesia, a 5-[place]i short sheath was placed in the right common femoral artery by single anterior percutaneous stick. Selective right common femoral angiography showed that the access was in the right common femoral artery above its bifurcation. A 5-[place]i JL4 diagnostic catheter was advanced over a 0.035 guidewire and the left main coronary artery was selectively engaged. Angiography of the left system was performed in multiple orthogonal views. The 5-[place]i JL4 diagnostic catheter and the guidewire were removed. Right coronary artery was never selectively engaged because it is known to be occluded based on prior angiography. A 5-[place]i angled pigtail catheter was advanced over a 0.035 guidewire across the aortic valve into the left ventricle. Left ventricular end-diastolic pressure was measured. Left ventriculography was performed in about 30° MOYA view.   Slow manual pullback was performed across the aortic valve. At the conclusion of the procedure, it was decided to proceed with PCI of the ostial left anterior descending artery. The existing 5 Georgian right femoral artery sheath was changed to a 6 [place] short sheath. Heparin was administered and ACT was documented. A 6 Georgian XB 3.5 guide catheter was advanced over a 0.035 guidewire and the Left main was selectively engaged. A 0.014 180cm Run through wire would not advance into the left anterior descending artery and was removed. A 0.014 190 cm  50 wire was advanced without difficulty into the left anterior descending artery. Attempt was made to advance a 2.0 mm cutting balloon, without success, and the balloon was removed. A 1.5 x 8 mm mini trek balloon was advanced, but would not make it into the ostium and proximal left anterior descending artery and was removed. A 1.2 x 8 mm mini trek balloon was then advanced, would not go past the ostium of the left anterior descending artery. The balloon was removed. Further attempts were aborted. There were no immediate complications. The right femoral artery sheath was removed and hemostasis was achieved using Angio-Seal hemostatic device. Discussed with Dr. Malick Jacobs primary cardiologist.   Yunier Dus / ANGIOGRAPHIC DATA:    1. Left ventricular end diastolic pressure was 15 mm pre-A wave and 25 mm Hg post A wave. Significant respiratory variation is noted. Underlying sleep apnea is probable. There is no systolic gradient across the aortic valve. 2. Left ventriculography revealed an EF around 50%. There is apical hypokinesis. There is mitral regurgitation, the study is not adequate to comment on extent of mitral regurgitation, please refer to alternate imaging. 3. The left main coronary artery  has less than 10% stenosis, radiopaque stent in the left main coronary artery is widely patent and extends into the left circumflex artery. .  4. The left anterior descending artery has 90 to 95% ostial stenosis and is a bypassed vessel. Native left anterior descending artery is 100% occluded after the second septal  and the second diagonal branch. The first and second diagonal branches are relatively small in caliber but there also underfilling because of the ostial stenosis. The first septal  is small, the second septal  is larger. .  5. The left circumflex is large, nondominant. Recently deployed radiopaque stent in the ostial proximal circumflex and in the left main coronary artery is widely patent with less than 20% stenosis. Previously deployed stent in the AV groove left circumflex artery has 50% smooth stenosis. First major obtuse marginal branch measures about 2.5 mm and divides into 2 secondary branches. The AV groove left circumflex artery after that has relatively small caliber and gives off a small distal obtuse marginal branch. 6. The right coronary artery is seen to fill by grade 2 left to right collaterals. The PDA is the vessel that is seen filling  7. LIMA to LAD is widely patent, fills mid and distal LAD, distal runoff is good. This vessel also fills the distal RCA via collaterals. 8. PCI of the ostial LAD was attempted because of patient's symptoms, and because of its supplied to to septal perforators and medium sized diagonal branch. As noted before these vessels may be undersized because of ostial LAD stenosis. Procedure was aborted when a 1.2 mm balloon would not cross the ostium. Medical therapy is advised. 9. Selective right common femoral angiography showed that the access is in the right common femoral artery above its bifurcation. RECOMMENDATIONS:  Post-procedure care will focus on prevention of any ischemic events and congestive complications. Aggressive risk factor modification and dual antiplatelet therapy are recommended. Medical therapy will be asked optimized.   Outpatient follow-up with primary cardiologist Dr. Claudine Mishra.     Carlo Oseguera MD

## 2021-05-07 NOTE — CARE COORDINATION
BRILINTA DISCOUNT CARD ON CHART. Patient comes in for 3 month depo injection. Patient tolerated with no adverse effects, please see MAR for further details.

## 2021-06-23 NOTE — PROGRESS NOTES
2021    Assessment:       Diagnosis Orders   1. Type 2 diabetes mellitus with hyperglycemia, with long-term current use of insulin ([facility])  POCT Glucose    HM DIABETES FOOT EXAM    Hemoglobin Z9I    Basic Metabolic Panel, Fasting   2. Hypothyroidism, unspecified type  T4, Free    TSH with Reflex   3.  Stage 3 chronic kidney disease, unspecified whether stage 3a or 3b CKD ([facility])           PLAN:     Orders Placed This Encounter   Procedures    Hemoglobin A1C     Standing Status:   Future     Standing Expiration Date:   2022    Basic Metabolic Panel, Fasting     Standing Status:   Future     Standing Expiration Date:   2022    T4, Free     Standing Status:   Future     Standing Expiration Date:   2022    TSH with Reflex     Standing Status:   Future     Standing Expiration Date:   2022    POCT Glucose     DIABETES FOOT EXAM     Continue Lantus 50 units at bedtime plus Humalog 15 with each meals continue levothyroxine 50 mcg daily  Orders Placed This Encounter   Medications    Continuous Blood Gluc Sensor (FREESTYLE FELY 14 DAY SENSOR) MISC     Sig: Every 2 weeks     Dispense:  2 each     Refill:  06    Dulaglutide (TRULICITY) 7.27 GR/9.4NM SOPN     Sig: Inject 0.75 mg into the skin once a week     Dispense:  4 pen     Refill:  3    Insulin Pen Needle ([employer] PEN NEEDLES 31G) 31G X 8 MM MISC     Si each by Does not apply route 4 times daily     Dispense:  300 each     Refill:  3           Orders Placed This Encounter   Procedures    POCT Glucose       Subjective:     Chief Complaint   Patient presents with    Diabetes    Hypothyroidism    Obesity     Vitals:    21 1306   BP: 119/67   Pulse: 65   SpO2: 96%   Weight: 242 lb (109.8 kg)     Wt Readings from Last 3 Encounters:   21 242 lb (109.8 kg)   21 241 lb 13.5 oz (109.7 kg)   21 250 lb (113.4 kg)     BP Readings from Last 3 Encounters:   21 119/67   21 (!) 142/60   21 (!) 123/57 Follow-up on type 2 diabetes patient on Lantus plus Humalog and Trulicity  Using freestyle malu continuous glucose monitoring  A1c has improved  Complications include chronic kidney disease    Hypothyroidism on thyroid replacement thyroid function test have been stable    Diabetes  She presents for her follow-up diabetic visit. She has type 2 diabetes mellitus. Symptoms are improving. Diabetic complications include heart disease and nephropathy. Risk factors for coronary artery disease include obesity. Current diabetic treatment includes insulin injections. She is currently taking insulin pre-breakfast, pre-lunch, pre-dinner and at bedtime. Her overall blood glucose range is 130-140 mg/dl.  (Lab Results       Component                Value               Date                       LABA1C                   7.1 (H)             2021            )     Past Medical History:   Diagnosis Date    Asthma     CAD (coronary artery disease)     CKD (chronic kidney disease) stage 3, GFR 30-59 ml/min ([facility])     Colitis     Diabetes mellitus ([facility]     Hyperlipidemia     Hypertension     PAD (peripheral artery disease) ([facility])     Prolonged emergence from general anesthesia     PVD (peripheral vascular disease) ([facility]     Thyroid disease      Past Surgical History:   Procedure Laterality Date    CARDIAC SURGERY      CATARACT REMOVAL WITH IMPLANT Bilateral 11- 11-     SECTION      COLONOSCOPY N/A 2019    COLONOSCOPY DIAGNOSTIC performed by Ansley Marin MD at [address] [place] [address] GRAFT  2019    unknown vessels    HYSTERECTOMY      TOE AMPUTATION Right     3rd toe     Social History     Socioeconomic History    Marital status:      Spouse name: Not on file    Number of children: Not on file    Years of education: Not on file    Highest education level: Not on file   Occupational History    Not on file   Tobacco Use    Smoking status: Never Meetings:     Marital Status:    Intimate Partner Violence:     Fear of Current or Ex-Partner:     Emotionally Abused:     Physically Abused:     Sexually Abused:      History reviewed. No pertinent family history.   Allergies   Allergen Reactions    Ambien [Zolpidem Tartrate]     Capoten [Captopril]     Clioquinol     Cogentin [Benztropine]     Depakote [Divalproex Sodium]     Effexor Xr [Venlafaxine Hcl Er]     Geodon [Ziprasidone Hcl]     Lisinopril      Hyperkalemia: 4/21/20 potassium was 6.7    Lyrica [Pregabalin]     Navane [Thiothixene]     Pamelor [Nortriptyline Hcl]     Remeron [Mirtazapine]     Risperdal [Risperidone]     Trazodone And Nefazodone     Wellbutrin [Bupropion]        Current Outpatient Medications:     ranolazine (RANEXA) 500 MG extended release tablet, Take 1 tablet by mouth 2 times daily, Disp: 60 tablet, Rfl: 3    atorvastatin (LIPITOR) 40 MG tablet, Take 2 tablets by mouth nightly, Disp: 30 tablet, Rfl: 3    albuterol sulfate HFA (VENTOLIN HFA) 108 (90 Base) MCG/ACT inhaler, Inhale 2 puffs into the lungs as needed for Wheezing Every 4-6 hours PRN, Disp: 1 Inhaler, Rfl: 5    montelukast (SINGULAIR) 10 MG tablet, Take 1 tablet by mouth nightly, Disp: 30 tablet, Rfl: 5    levothyroxine (SYNTHROID) 50 MCG tablet, take 1 tablet by mouth once daily, Disp: 30 tablet, Rfl: 5    Continuous Blood Gluc Sensor (FREESTYLE FELY 14 DAY SENSOR) MISC, Every 2 weeks, Disp: 2 each, Rfl: 06    Alcohol Swabs (ALCOHOL PREP) 70 % PADS, qid, Disp: 300 each, Rfl: 06    Insulin Pen Needle (NOVOFINE) 32G X 6 MM MISC, qid, Disp: 300 each, Rfl: 3    insulin glargine (LANTUS SOLOSTAR) 100 UNIT/ML injection pen, 50 units at bedtime, Disp: 15 pen, Rfl: 3    Dulaglutide (TRULICITY) 5.61 SY/4.2KJ SOPN, Inject 0.75 mg into the skin once a week, Disp: 4 pen, Rfl: 3    insulin lispro, 1 Unit Dial, (HUMALOG KWIKPEN) 100 UNIT/ML SOPN, 15  units at each meals, Disp: 10 pen, Rfl: 03    oxybutynin (DITROPAN-XL) 5 MG extended release tablet, take 1 tablet by mouth once daily, Disp: 90 tablet, Rfl: 3    RESTASIS 0.05 % ophthalmic emulsion, , Disp: , Rfl:     neomycin-polymyxin-dexameth (MAXITROL) 3.5-15748-1.1 ophthalmic suspension, instill 1 drop into both eyes four times a day, Disp: , Rfl:     ARTIFICIAL TEARS 1.4 % ophthalmic solution, , Disp: , Rfl:     docusate sodium (COLACE, DULCOLAX) 100 MG CAPS, Take 100 mg by mouth 2 times daily, Disp: 60 capsule, Rfl: 1    prazosin (MINIPRESS) 2 MG capsule, Take 2 mg by mouth nightly , Disp: , Rfl:     sertraline (ZOLOFT) 100 MG tablet, Take 200 mg by mouth daily , Disp: , Rfl:     Continuous Blood Gluc Sensor (FREESTYLE FELY 14 DAY SENSOR) MISC, Every 2 weeks, Disp: 2 each, Rfl: 06    Continuous Blood Gluc  (FREESTYLE FELY 14 DAY READER) CATHLEEN, As directed, Disp: 1 Device, Rfl: 00    furosemide (LASIX) 40 MG tablet, Take 1 tablet by mouth daily, Disp: 60 tablet, Rfl: 3    ticagrelor (BRILINTA) 90 MG TABS tablet, Take 90 mg by mouth 2 times daily, Disp: , Rfl:     CPAP Machine MISC, by Does not apply route New CPAP with 10 cm, Disp: 1 each, Rfl: 0    aspirin 81 MG EC tablet, Take 1 tablet by mouth daily, Disp: 30 tablet, Rfl: 3    OXYGEN, 2 lit O2 with sleep , please give O2 concentrator, Disp: 1 Units, Rfl: 0    Emollient (EUCERIN INTENSIVE REPAIR HAND) 2.5-10 % CREA, APPLY TO FEET AT BEDTIME; PLACE SOCKS OVER FEET AFTER APPLICATION IF NEEDED FOR DRY CRACKING FEET, Disp: , Rfl:     hydrOXYzine (VISTARIL) 25 MG capsule, Take 50 mg by mouth 3 times daily as needed , Disp: , Rfl:     Nutritional Supplements (GLUCERNA SHAKE) LIQD, take as directed three times a day, Disp: , Rfl:     insulin glargine (LANTUS) 100 UNIT/ML injection vial, Inject 30 Units into the skin nightly (Patient taking differently: Inject 35 Units into the skin nightly ), Disp: 1 vial, Rfl: 3    isosorbide dinitrate (ISORDIL) 20 MG tablet, Take 1 tablet by mouth 3 times daily, Disp: 90 tablet, Rfl: 3    nitroGLYCERIN (NITROSTAT) 0.4 MG SL tablet, up to max of 3 total doses. If no relief after 1 dose, call 911., Disp: 25 tablet, Rfl: 3    hydrALAZINE (APRESOLINE) 50 MG tablet, Take 1 tablet by mouth every 8 hours, Disp: 90 tablet, Rfl: 3    metoprolol succinate (TOPROL XL) 50 MG extended release tablet, Take 1 tablet by mouth 2 times daily, Disp: 30 tablet, Rfl: 3    amitriptyline (ELAVIL) 25 MG tablet, Take 25 mg by mouth nightly, Disp: , Rfl:     gabapentin (NEURONTIN) 400 MG capsule, Take 400 mg by mouth 2 times daily. AM and 800 mg in pm-9pm, Disp: , Rfl:     haloperidol (HALDOL) 5 MG tablet, Take 5 mg by mouth daily, Disp: , Rfl:     FreeStyle Lancets MISC, 1 each by Does not apply route daily, Disp: 100 each, Rfl: 3    blood glucose test strips (FREESTYLE LITE) strip, 1 each by In Vitro route daily As needed. , Disp: 100 each, Rfl: 3    folic acid (FOLVITE) 1 MG tablet, Take 1 mg by mouth daily, Disp: , Rfl:     Iron Polysacch Dyhuq-Q82-GC (NIFEREX-150 FORTE PO), Take 1 tablet by mouth daily , Disp: , Rfl:     Cyanocobalamin (VITAMIN B-12) 1000 MCG extended release tablet, Take 1,000 mcg by mouth daily, Disp: , Rfl:     meclizine (ANTIVERT) 25 MG tablet, Take 25 mg by mouth three times daily, Disp: , Rfl:     albuterol (PROVENTIL) (2.5 MG/3ML) 0.083% nebulizer solution, Take 3 mLs by nebulization every 6 hours as needed for Wheezing, Disp: 120 each, Rfl: 5  Lab Results   Component Value Date     06/08/2021    K 4.8 06/08/2021    CL 98 06/08/2021    CO2 25 06/08/2021    BUN 33 (H) 06/08/2021    CREATININE 1.58 (H) 06/08/2021    GLUCOSE 163 06/23/2021    CALCIUM 9.8 06/08/2021    PROT 6.9 04/06/2021    LABALBU 3.6 04/06/2021    BILITOT 0.3 04/06/2021    ALKPHOS 87 04/06/2021    AST 20 04/06/2021    ALT 16 04/06/2021    LABGLOM 33.0 (L) 06/08/2021    GFRAA 39.9 (L) 06/08/2021    GLOB 3.3 04/06/2021     Lab Results   Component Value Date    WBC 10.9 (H) 04/06/2021    HGB 11.6 (L) 04/06/2021    HCT 34.8 (L) 04/06/2021    MCV 74.1 (L) 04/06/2021     04/06/2021     Lab Results   Component Value Date    LABA1C 7.1 (H) 06/08/2021    LABA1C 9.0 (H) 03/09/2021    LABA1C 7.3 12/21/2020     Lab Results   Component Value Date    HDL 45 03/09/2021    HDL 40 09/30/2020    HDL 35 (L) 09/02/2020    LDLCALC 66 03/09/2021    LDLCALC 85 09/30/2020    LDLCALC 55 09/02/2020    CHOL 127 03/09/2021    CHOL 139 09/30/2020    CHOL 105 09/02/2020    TRIG 79 03/09/2021    TRIG 69 09/30/2020    TRIG 75 09/02/2020     No results found for: TESTM  Lab Results   Component Value Date    TSH 1.390 04/06/2021    TSH 1.420 12/01/2020    TSH 2.810 09/29/2020    TSHREFLEX 1.870 06/08/2021    TSHREFLEX 1.680 03/09/2021    TSHREFLEX 2.530 04/21/2020    T4FREE 1.44 06/08/2021    T4FREE 1.57 03/09/2021    T4FREE 1.58 12/01/2020     No results found for: TPOABS    Review of Systems   All other systems reviewed and are negative. Objective:   Physical Exam  Vitals reviewed. Constitutional:       Appearance: Normal appearance. She is obese. HENT:      Head: Normocephalic and atraumatic. Right Ear: External ear normal.      Left Ear: External ear normal.      Nose: Nose normal.   Eyes:      General: No scleral icterus. Right eye: No discharge. Left eye: No discharge. Extraocular Movements: Extraocular movements intact. Conjunctiva/sclera: Conjunctivae normal.   Neck:      Trachea: Trachea normal.   Cardiovascular:      Rate and Rhythm: Normal rate. Pulmonary:      Effort: Pulmonary effort is normal.   Musculoskeletal:         General: Normal range of motion. Cervical back: Normal range of motion and neck supple. Feet:    Neurological:      General: No focal deficit present. Mental Status: She is alert and oriented to person, place, and time.    Psychiatric:         Mood and Affect: Mood normal.         Behavior: Behavior normal.

## 2021-06-29 NOTE — PROGRESS NOTES
Subjective:     Austen Mo is a 63 y.o. female who complains today of:     Chief Complaint   Patient presents with    Sleep Apnea     3 month f/u    Asthma       HPI  She is using CPAP with 10   centimeters of H2O with heated humidity  On 2 lit bleed . She is using CPAP for about  7  hours every night. She is using CPAP with full face   Mask. She said  sleep is restful with the CPAP use. She is compliant with CPAP therapy and benefiting with CPAP use. No snoring with CPAP use. No complaint of daytime sleepiness or tiredness with CPAP use. She denies taking naps. She denies difficulty falling asleep or staying asleep. I reviewed compliance report availabe     She is using albuterol nebulizer prn, she was on ventolin HFA , montelukast  2 lit O2 with sleep and prn during daytime. C/o shortness of breath with exertion. No Wheezing. No Cough   No Hemoptysis. No Chest tightness. No Chest pain with radiation  or pleuritic pain. C/o  leg edema. No orthopnea. No Fever or chills. No Rhinorrhea and postnasal drip.         Allergies:  Ambien [zolpidem tartrate], Capoten [captopril], Clioquinol, Cogentin [benztropine], Depakote [divalproex sodium], Effexor xr [venlafaxine hcl er], Geodon [ziprasidone hcl], Lisinopril, Lyrica [pregabalin], Navane [thiothixene], Pamelor [nortriptyline hcl], Remeron [mirtazapine], Risperdal [risperidone], Trazodone and nefazodone, and Wellbutrin [bupropion]  Past Medical History:   Diagnosis Date    Asthma     CAD (coronary artery disease)     CKD (chronic kidney disease) stage 3, GFR 30-59 ml/min (HCC)     Colitis     Diabetes mellitus ([address]     Hyperlipidemia     Hypertension     [facility]     Prolonged emergence from general anesthesia     PVD [facility]peripheral vascular disease) [facility]     Thyroid disease      Past Surgical History:   Procedure Laterality Date    CARDIAC SURGERY      CATARACT REMOVAL WITH IMPLANT Bilateral 11- 11-     SECTION      COLONOSCOPY N/A 2019    COLONOSCOPY DIAGNOSTIC performed by Lucy Carson MD at [address] GRAFT  2019    unknown vessels    HYSTERECTOMY      TOE AMPUTATION Right     3rd toe     No family history on file. Social History     Socioeconomic History    Marital status:      Spouse name: Not on file    Number of children: Not on file    Years of education: Not on file    Highest education level: Not on file   Occupational History    Not on file   Tobacco Use    Smoking status: Never Smoker    Smokeless tobacco: Never Used   Vaping Use    Vaping Use: Never used   Substance and Sexual Activity    Alcohol use: Never    Drug use: Never    Sexual activity: Not on file   Other Topics Concern    Not on file   Social History Narrative         Lives With: Spouse    Type of Home: [address] [place] [address] [address]: One level    Home Access: Elevator    Bathroom Shower/Tub: Tub/Shower unit(Simultaneous filing. User may not have seen previous data.)    Bathroom Equipment: Grab bars in shower, Shower chair    Home Equipment: Rolling walker, [address] bed    ADL Assistance: Needs assistance    Homemaking Assistance: Needs assistance    Homemaking Responsibilities: No    Ambulation Assistance: Independent    Transfer Assistance: Independent    Active : No    Additional Comments: Pt wears orthopedic shoes at baseline    The patient states she is current with [facility](RN per the ). The patient had a walker, but obtained a hospital bed, wheel chair, BSC and O2 last admission (from [address]). pharmacy is [address] [place] [address],[address] [address]., [facility].       Transportation is provided by [employer]	[place] () per the patient     Social Determinants of Health     Financial Resource Strain:     Difficulty of Paying Living Expenses:    Food Insecurity:     Worried About Running Out of Food in the Last Year:     [address] in the Last Year:    Transportation Needs:     Lack of Transportation (Medical):  Lack of Transportation (Non-Medical):    Physical Activity:     Days of Exercise per Week:     Minutes of Exercise per Session:    Stress:     Feeling of Stress :    Social Connections:     Frequency of Communication with Friends and Family:     Frequency of Social Gatherings with Friends and Family:     Attends Hinduism Services:     Active Member of Clubs or Organizations:     Attends Club or Organization Meetings:     Marital Status:    Intimate Partner Violence:     Fear of Current or Ex-Partner:     Emotionally Abused:     Physically Abused:     Sexually Abused:          Review of Systems   Constitutional: Negative for chills, diaphoresis, fatigue and fever. HENT: Negative for congestion, mouth sores, nosebleeds, postnasal drip, rhinorrhea, sinus pressure, sneezing, sore throat, trouble swallowing and voice change. Eyes: Negative for discharge, itching and visual disturbance. Respiratory: Positive for shortness of breath. Negative for cough, chest tightness and wheezing. Cardiovascular: Positive for leg swelling. Negative for chest pain and palpitations. Gastrointestinal: Negative for abdominal pain, diarrhea, nausea and vomiting. Genitourinary: Negative for difficulty urinating and hematuria. Musculoskeletal: Negative for arthralgias, joint swelling and myalgias. Skin: Negative for rash. Allergic/Immunologic: Negative for environmental allergies and food allergies. Neurological: Negative for dizziness, tremors, weakness and headaches. Psychiatric/Behavioral: Positive for sleep disturbance. Negative for behavioral problems.          :     Vitals:    06/29/21 1158   BP: 120/70   Pulse: 68   Temp: 98.1 °F (36.7 °C)   SpO2: 98%   Weight: 240 lb (108.9 kg)   Height: 5' 4\" (1.626 m)     Wt Readings from once a week 4 pen 3    Insulin Pen Needle ([employer] PEN NEEDLES 31G) 31G X 8 MM MISC 1 each by Does not apply route 4 times daily 300 each 3    ranolazine (RANEXA) 500 MG extended release tablet Take 1 tablet by mouth 2 times daily 60 tablet 3    atorvastatin (LIPITOR) 40 MG tablet Take 2 tablets by mouth nightly 30 tablet 3    levothyroxine (SYNTHROID) 50 MCG tablet take 1 tablet by mouth once daily 30 tablet 5    Alcohol Swabs (ALCOHOL PREP) 70 % PADS qid 300 each 06    Insulin Pen Needle (NOVOFINE) 32G X 6 MM MISC qid 300 each 3    insulin glargine (LANTUS SOLOSTAR) 100 UNIT/ML injection pen 50 units at bedtime 15 pen 3    insulin lispro, 1 Unit Dial, (HUMALOG KWIKPEN) 100 UNIT/ML SOPN 15  units at each meals 10 pen 03    oxybutynin (DITROPAN-XL) 5 MG extended release tablet take 1 tablet by mouth once daily 90 tablet 3    RESTASIS 0.05 % ophthalmic emulsion       neomycin-polymyxin-dexameth (MAXITROL) 3.5-74511-9.1 ophthalmic suspension instill 1 drop into both eyes four times a day      ARTIFICIAL TEARS 1.4 % ophthalmic solution       docusate sodium (COLACE, DULCOLAX) 100 MG CAPS Take 100 mg by mouth 2 times daily 60 capsule 1    prazosin (MINIPRESS) 2 MG capsule Take 2 mg by mouth nightly       sertraline (ZOLOFT) 100 MG tablet Take 200 mg by mouth daily       Continuous Blood Gluc Sensor (FREESTYLE FELY 14 DAY SENSOR) MISC Every 2 weeks 2 each 06    Continuous Blood Gluc  (FREESTYLE FELY 14 DAY READER) CATHLEEN As directed 1 Device 00    furosemide (LASIX) 40 MG tablet Take 1 tablet by mouth daily 60 tablet 3    ticagrelor (BRILINTA) 90 MG TABS tablet Take 90 mg by mouth 2 times daily      CPAP Machine MISC by Does not apply route New CPAP with 10 cm 1 each 0    aspirin 81 MG EC tablet Take 1 tablet by mouth daily 30 tablet 3    OXYGEN 2 lit O2 with sleep , please give O2 concentrator 1 Units 0    Emollient (EUCERIN INTENSIVE REPAIR HAND) 2.5-10 % CREA APPLY TO FEET AT BEDTIME; PLACE SOCKS OVER FEET AFTER APPLICATION IF NEEDED FOR DRY CRACKING FEET      hydrOXYzine (VISTARIL) 25 MG capsule Take 50 mg by mouth 3 times daily as needed       Nutritional Supplements (GLUCERNA SHAKE) LIQD take as directed three times a day      insulin glargine (LANTUS) 100 UNIT/ML injection vial Inject 30 Units into the skin nightly (Patient taking differently: Inject 35 Units into the skin nightly ) 1 vial 3    isosorbide dinitrate (ISORDIL) 20 MG tablet Take 1 tablet by mouth 3 times daily 90 tablet 3    nitroGLYCERIN (NITROSTAT) 0.4 MG SL tablet up to max of 3 total doses. If no relief after 1 dose, call 911. 25 tablet 3    hydrALAZINE (APRESOLINE) 50 MG tablet Take 1 tablet by mouth every 8 hours 90 tablet 3    metoprolol succinate (TOPROL XL) 50 MG extended release tablet Take 1 tablet by mouth 2 times daily 30 tablet 3    amitriptyline (ELAVIL) 25 MG tablet Take 25 mg by mouth nightly      gabapentin (NEURONTIN) 400 MG capsule Take 400 mg by mouth 2 times daily. AM and 800 mg in pm-9pm      haloperidol (HALDOL) 5 MG tablet Take 5 mg by mouth daily      FreeStyle Lancets MISC 1 each by Does not apply route daily 100 each 3    blood glucose test strips (FREESTYLE LITE) strip 1 each by In Vitro route daily As needed. 374 each 3    folic acid (FOLVITE) 1 MG tablet Take 1 mg by mouth daily      Iron Polysacch Glgaf-U35-GB (NIFEREX-150 FORTE PO) Take 1 tablet by mouth daily       Cyanocobalamin (VITAMIN B-12) 1000 MCG extended release tablet Take 1,000 mcg by mouth daily      meclizine (ANTIVERT) 25 MG tablet Take 25 mg by mouth three times daily       No current facility-administered medications for this visit. Results for orders placed during the hospital encounter of 06/28/20    XR CHEST STANDARD (2 VW)    Narrative  XR CHEST (2 VW) : 6/29/2020    CLINICAL HISTORY:  SOB .    COMPARISON: 6/28/2020.     TECHNIQUE: Upright AP and lateral radiographs of the chest were obtained. FINDINGS:    A shallow inspiration is present without significant infiltrate identified. The heart remains mildly enlarged with postoperative changes from previous CABG. There is no significant pleural effusion, vascular congestion, pneumothorax, or displaced fractures identified. Impression  NO EVIDENCE OF ACTIVE CARDIOPULMONARY DISEASE. Results for orders placed during the hospital encounter of 20    XR CHEST STANDARD (2 VW)    Narrative  Patient MRN: [id number]    : 1955    Age:  60 years    Gender: Female    Order Date: 2020 7:00 AM.    Exam: XR CHEST (2 VW)    Number of Views: 2    Indication:  Shortness of breath and pulmonary hypertension. 82-year-old female presenting to emergency department with shortness of breath times several months, fall/syncope at home and evaluation of mental status changes    Comparison: 2020    Findings: Stable, enlarged cardiomediastinal silhouette with underlying pulmonary edema and nonspecific bibasilar airspace disease. Median sternotomy wires. Vascular calcifications thoracic aorta. No pneumothorax. Impression  Impression:  1. Stable, enlarged cardiac mediastinal silhouette with underlying pulmonary edema  10. . Nonspecific bibasilar airspace disease, findings can be seen in infiltrate/pneumonia and/or atelectasis. Efe Martinez Results for orders placed during the hospital encounter of 20    XR CHEST STANDARD (2 VW)    Narrative  Patient MRN: [id number]    : 1955    Age:  60 years    Gender: Female    Order Date: 2020 11:45 AM.    Exam: XR CHEST (2 VW)    Number of Views: 3    Indication:  Chest pain x4 days. Comparison: None    Findings: Median sternotomy wires. Cardiomediastinal silhouette is within normal limits.  Lungs are clear without evidence of infiltrate/pneumonia, pneumothorax or pleural effusion    Impression  No radiographic evidence of acute cardiopulmonary disease  ]  Results for orders placed during the hospital encounter of 04/06/21    XR CHEST PORTABLE    Narrative  EXAMINATION: XR CHEST PORTABLE    CLINICAL HISTORY: CHEST    COMPARISONS: SEPTEMBER 29, 2020    FINDINGS: Median sternotomy. Cardiopericardial silhouette enlarged and unchanged. Left cardiac margin has downward outward prominence, unchanged. Lungs clear. Impression  STABLE CARDIOMEGALY WITH RADIOGRAPHIC SIGNS OF LEFT VENTRICULAR ENLARGEMENT, UNCHANGED. Results for orders placed during the hospital encounter of 09/29/20    XR CHEST PORTABLE    Narrative  Exam: XR CHEST PORTABLE    History:  Chest pain    Technique: AP portable view of the chest obtained. Comparison: Chest x-ray from June 29, 2020    Findings: There is no change in the median sternotomy. The cardiac silhouette is at the upper limits of normal in size. There are no infiltrates, consolidations or effusions. Bones of the thorax appear intact. Impression  No radiographic evidence of acute intrathoracic process. Results for orders placed during the hospital encounter of 06/28/20    XR CHEST PORTABLE    Narrative  EXAMINATION: XR CHEST PORTABLE    CLINICAL HISTORY: CHEST PAIN    COMPARISONS: CHEST RADIOGRAPH, ELENITA 15, 2020    FINDINGS: Rotated to left. Median sternotomy. Cardiac blood enlarged, unchanged. Pulmonary vasculature normal. Lungs clear. Impression  NO ACUTE CARDIOPULMONARY DISEASE. STABLE CARDIOMEGALY.  ]  Results for orders placed during the hospital encounter of 09/27/19    XR CHEST (SINGLE VIEW FRONTAL)    Narrative  EXAMINATION: XR CHEST (SINGLE VIEW FRONTAL)    CLINICAL HISTORY: Z91.89 At risk for ineffective breathing ICD10    COMPARISONS: None available. FINDINGS: Single AP portable view the chest obtained on September 27, 2019 at 1715 hours. There is mild cardiomegaly without vascular congestion pulmonary edema or pleural effusion. The mediastinum is not widened or shifted. The calcified aorta is not  dilated.  Sternotomy sutures are present. The chest wall is unremarkable. CONCLUSION: NO ACUTE PROCESS      Assessment/Plan:     1. Mild intermittent asthma without complication    She is using albuterol nebulizer prn, she was on ventolin HFA , montelukast.  2 lit O2 with sleep and prn during daytime. C/o shortness of breath with exertion. No Wheezing. No Cough. Continue bronchodilator therapy as before    - albuterol sulfate HFA (VENTOLIN HFA) 108 (90 Base) MCG/ACT inhaler; Inhale 2 puffs into the lungs as needed for Wheezing Every 4-6 hours PRN  Dispense: 1 Inhaler; Refill: 5  - albuterol (PROVENTIL) (2.5 MG/3ML) 0.083% nebulizer solution; Take 3 mLs by nebulization every 6 hours as needed for Wheezing  Dispense: 120 each; Refill: 5  - montelukast (SINGULAIR) 10 MG tablet; Take 1 tablet by mouth nightly  Dispense: 30 tablet; Refill: 5    2. JESICA (obstructive sleep apnea)  She is using CPAP with 10   centimeters of H2O with heated humidity  On 2 lit bleed . She is using CPAP for about  7  hours every night. She is using CPAP with full face   Mask. She said  sleep is restful with the CPAP use. She is compliant with CPAP therapy and benefiting with CPAP use. No snoring with CPAP use. I reviewed compliance report with patient regarding CPAP therapy. She is using  CPAP for 30 days out of 30 days  Average usage of days used is 5* hours and 39 min , average AHI 3.5 with CPAP use. 3. Nocturnal hypoxia  She is using 2 L O2 with sleep. Continue O2 to keep saturation 90% above. 4. Congestive heart failure, unspecified HF chronicity, unspecified heart failure type [facility])  She is following with cardiology    5. Obesity (BMI 30-39. 9)  She is advised try to lose weight. obesity related risk explained to the patient ,  Current weight:  240 lb (108.9 kg) Lbs. BMI:  Body mass index is 41.2 kg/m². Suggested weight control approaches, including dietary changes , exercise, behavioral modification.       Return in about 4 months (around 10/29/2021) for vincent, asthma.       Carlos Brownlee MD

## 2021-07-22 NOTE — TELEPHONE ENCOUNTER
Patient requesting medication refill.  Please approve or deny this request.    Rx requested:  Requested Prescriptions     Pending Prescriptions Disp Refills    insulin lispro, 1 Unit Dial, (HUMALOG KWIKPEN) 100 UNIT/ML SOPN 10 pen 03     Sig: 15  units at each meals         Last Office Visit:   6/23/2021      Next Visit Date:  Future Appointments   Date Time Provider Asif Daley   9/9/2021 11:00 AM London Painter, [address]   9/22/2021  1:15 PM Malachi Ba MD [facility]   10/28/2021  2:00 PM Zoran Swenson MD [facility]

## 2021-08-15 NOTE — ED TRIAGE NOTES
Pt reports that she had abd pain that started about 2 hr ago pt alert rr even non labored ambulated to triage family with pt   denies hx kidney stones last BM yesterday

## 2021-08-22 PROBLEM — R79.89 ELEVATED TROPONIN: Status: ACTIVE | Noted: 2021-01-01

## 2021-08-22 PROBLEM — R77.8 ELEVATED TROPONIN: Status: ACTIVE | Noted: 2021-01-01

## 2021-08-22 PROBLEM — J18.9 PNEUMONIA: Status: ACTIVE | Noted: 2021-01-01

## 2021-08-22 PROBLEM — E11.9 DIABETES MELLITUS (HCC): Status: ACTIVE | Noted: 2019-02-25

## 2021-08-22 NOTE — ED TRIAGE NOTES
Patient presents with complaints of chest pain, dizziness, and shortness of breath x 1 week, worse today. Patient states pain is left anterior chest and describes it as a burning and squeezing. No distress noted on arrival. Skin pink, warm, and dry. Respirations equal and unlabored.

## 2021-08-22 NOTE — ED PROVIDER NOTES
eMERGENCY dEPARTMENT eNCOUnter      [address]    Chief Complaint   Patient presents with    Chest Pain     chest pain, shortness of breath, and dizzy x 1 week, worse today   note-patient is Bruneian-speaking and  phone was used. ROHIT Curry is a 63 y.o. female with history of asthma, coronary disease, hypertension, hyperlipidemia, diabetes mellitus, who presentsto ED from home   By private car  With complaint of chest pain  Onset 1 week. Intensity of symptoms moderate left-sided  Location of symptoms left side   Pain is sharp, moderate intensity with no radiation. Patient denies any shortness of breath.   Patient follows up with Dr. Ronald Herrera    Past Medical History:   Diagnosis Date    Asthma     CAD (coronary artery disease)     CKD (chronic kidney disease) stage 3, GFR 30-59 ml/min ([facility])     Colitis     [facility]     Hyperlipidemia     Hypertension     PAD (peripheral artery disease) ([facility])     Prolonged emergence from general anesthesia     PVD [facility]peripheral vascular disease) [facility]     Thyroid disease        SURGICAL HISTORY    Past Surgical History:   Procedure Laterality Date    CARDIAC SURGERY      CATARACT REMOVAL WITH IMPLANT Bilateral 11- 11-     SECTION      COLONOSCOPY N/A 2019    COLONOSCOPY DIAGNOSTIC performed by Nasrin Alcocer MD at [facility] GRAFT  2019    unknown vessels    HYSTERECTOMY      TOE AMPUTATION Right     3rd toe       CURRENT MEDICATIONS    Current Outpatient Rx   Medication Sig Dispense Refill    levoFLOXacin (LEVAQUIN) 750 MG tablet Take 1 tablet by mouth daily for 7 days 7 tablet 0    insulin lispro, 1 Unit Dial, (HUMALOG KWIKPEN) 100 UNIT/ML SOPN 15  units at each meals 10 pen 03    albuterol sulfate HFA (VENTOLIN HFA) 108 (90 Base) MCG/ACT inhaler Inhale 2 puffs into the lungs as needed for Wheezing Every 4-6 hours PRN 1 Inhaler 5    albuterol (PROVENTIL) (2.5 MG/3ML) 0.083% nebulizer solution Take 3 mLs by nebulization every 6 hours as needed for Wheezing 120 each 5    montelukast (SINGULAIR) 10 MG tablet Take 1 tablet by mouth nightly 30 tablet 5    Continuous Blood Gluc Sensor (FREESTYLE FELY 14 DAY SENSOR) MISC Every 2 weeks 2 each 06    Dulaglutide (TRULICITY) 1.29 YX/7.5QY SOPN Inject 0.75 mg into the skin once a week 4 pen 3    Insulin Pen Needle ([employer] PEN NEEDLES 31G) 31G X 8 MM MISC 1 each by Does not apply route 4 times daily 300 each 3    ranolazine (RANEXA) 500 MG extended release tablet Take 1 tablet by mouth 2 times daily 60 tablet 3    atorvastatin (LIPITOR) 40 MG tablet Take 2 tablets by mouth nightly 30 tablet 3    levothyroxine (SYNTHROID) 50 MCG tablet take 1 tablet by mouth once daily 30 tablet 5    Alcohol Swabs (ALCOHOL PREP) 70 % PADS qid 300 each 06    Insulin Pen Needle (NOVOFINE) 32G X 6 MM MISC qid 300 each 3    insulin glargine (LANTUS SOLOSTAR) 100 UNIT/ML injection pen 50 units at bedtime 15 pen 3    oxybutynin (DITROPAN-XL) 5 MG extended release tablet take 1 tablet by mouth once daily 90 tablet 3    RESTASIS 0.05 % ophthalmic emulsion       neomycin-polymyxin-dexameth (MAXITROL) 3.5-72524-4.1 ophthalmic suspension instill 1 drop into both eyes four times a day      ARTIFICIAL TEARS 1.4 % ophthalmic solution       docusate sodium (COLACE, DULCOLAX) 100 MG CAPS Take 100 mg by mouth 2 times daily 60 capsule 1    prazosin (MINIPRESS) 2 MG capsule Take 2 mg by mouth nightly       sertraline (ZOLOFT) 100 MG tablet Take 200 mg by mouth daily       Continuous Blood Gluc Sensor (FREESTYLE FELY 14 DAY SENSOR) MISC Every 2 weeks 2 each 06    Continuous Blood Gluc  (FREESTYLE FELY 14 DAY READER) CATHLEEN As directed 1 Device 00    furosemide (LASIX) 40 MG tablet Take 1 tablet by mouth daily 60 tablet 3    ticagrelor (BRILINTA) 90 MG TABS tablet Take 90 mg by mouth 2 times daily  CPAP Machine MISC by Does not apply route New CPAP with 10 cm 1 each 0    aspirin 81 MG EC tablet Take 1 tablet by mouth daily 30 tablet 3    OXYGEN 2 lit O2 with sleep , please give O2 concentrator 1 Units 0    Emollient (EUCERIN INTENSIVE REPAIR HAND) 2.5-10 % CREA APPLY TO FEET AT BEDTIME; PLACE SOCKS OVER FEET AFTER APPLICATION IF NEEDED FOR DRY CRACKING FEET      hydrOXYzine (VISTARIL) 25 MG capsule Take 50 mg by mouth 3 times daily as needed       Nutritional Supplements (GLUCERNA SHAKE) LIQD take as directed three times a day      insulin glargine (LANTUS) 100 UNIT/ML injection vial Inject 30 Units into the skin nightly (Patient taking differently: Inject 35 Units into the skin nightly ) 1 vial 3    isosorbide dinitrate (ISORDIL) 20 MG tablet Take 1 tablet by mouth 3 times daily 90 tablet 3    nitroGLYCERIN (NITROSTAT) 0.4 MG SL tablet up to max of 3 total doses. If no relief after 1 dose, call 911. 25 tablet 3    hydrALAZINE (APRESOLINE) 50 MG tablet Take 1 tablet by mouth every 8 hours 90 tablet 3    metoprolol succinate (TOPROL XL) 50 MG extended release tablet Take 1 tablet by mouth 2 times daily 30 tablet 3    amitriptyline (ELAVIL) 25 MG tablet Take 25 mg by mouth nightly      gabapentin (NEURONTIN) 400 MG capsule Take 400 mg by mouth 2 times daily. AM and 800 mg in pm-9pm      haloperidol (HALDOL) 5 MG tablet Take 5 mg by mouth daily      FreeStyle Lancets MISC 1 each by Does not apply route daily 100 each 3    blood glucose test strips (FREESTYLE LITE) strip 1 each by In Vitro route daily As needed.  336 each 3    folic acid (FOLVITE) 1 MG tablet Take 1 mg by mouth daily      Iron Polysacch Gjvic-R44-CH (NIFEREX-150 FORTE PO) Take 1 tablet by mouth daily       Cyanocobalamin (VITAMIN B-12) 1000 MCG extended release tablet Take 1,000 mcg by mouth daily      meclizine (ANTIVERT) 25 MG tablet Take 25 mg by mouth three times daily         ALLERGIES    Allergies   Allergen Reactions  Ambien [Zolpidem Tartrate]     Capoten [Captopril]     Clioquinol     Cogentin [Benztropine]     Depakote [Divalproex Sodium]     Effexor Xr [Venlafaxine Hcl Er]     Geodon [Ziprasidone Hcl]     Lisinopril      Hyperkalemia: 4/21/20 potassium was 6.7    Lyrica [Pregabalin]     Navane [Thiothixene]     Pamelor [Nortriptyline Hcl]     Remeron [Mirtazapine]     Risperdal [Risperidone]     Trazodone And Nefazodone     Wellbutrin [Bupropion]        FAMILY HISTORY    History reviewed. No pertinent family history. SOCIAL HISTORY    Social History     Socioeconomic History    Marital status:      Spouse name: None    Number of children: None    Years of education: None    Highest education level: None   Occupational History    None   Tobacco Use    Smoking status: Never Smoker    Smokeless tobacco: Never Used   Vaping Use    Vaping Use: Never used   Substance and Sexual Activity    Alcohol use: Never    Drug use: Never    Sexual activity: None   Other Topics Concern    None   Social History Narrative         Lives With: Spouse    Type of Home: [address] [place] [address] apt [facility] in [address]: One level    Home Access: Elevator    Bathroom Shower/Tub: Tub/Shower unit(Simultaneous filing. User may not have seen previous data.)    Bathroom Equipment: Grab bars in shower, Shower chair    Home Equipment: Rolling walker, [address] 450 bed    ADL Assistance: Needs assistance    Homemaking Assistance: Needs assistance    Homemaking Responsibilities: No    Ambulation Assistance: Independent    Transfer Assistance: Independent    Active : No    Additional Comments: Pt wears orthopedic shoes at baseline    The patient states she is current with [facility](RN per the ). The patient had a walker, but obtained a hospital bed, wheel chair, BSC and O2 last admission (from [address] [place] [address] Road). pharmacy is [address] [place] [address],[address] [address]., [place]. Transportation is provided by [employer]	[place] () per the patient     Social Determinants of Health     Financial Resource Strain:     Difficulty of Paying Living Expenses:    Food Insecurity:     Worried About Running Out of Food in the Last Year:     [address] in the Last Year:    Transportation Needs:     Lack of Transportation (Medical):  Lack of Transportation (Non-Medical):    Physical Activity:     Days of Exercise per Week:     Minutes of Exercise per Session:    Stress:     Feeling of Stress :    Social Connections:     Frequency of Communication with Friends and Family:     Frequency of Social Gatherings with Friends and Family:     Attends Muslim Services:     Active Member of Clubs or Organizations:     Attends Club or Organization Meetings:     Marital Status:    Intimate Partner Violence:     Fear of Current or Ex-Partner:     Emotionally Abused:     Physically Abused:     Sexually Abused:        REVIEW OF SYSTEMS    Constitutional:  Denies fever, chills, weight loss or weakness   Eyes:  Denies photophobia or discharge   HENT:  Denies sore throat or ear pain   Respiratory:  Denies cough or shortness of breath   Cardiovascular: Complains of chest pain, but denies palpitations or swelling   GI:  Denies abdominal pain, nausea, vomiting, or diarrhea   Musculoskeletal:  Denies back pain   Skin:  Denies rash   Neurologic:  Denies headache, focal weakness or sensory changes   Endocrine:  Denies polyuria or polydypsia   Lymphatic:  Denies swollen glands   Psychiatric:  Denies depression, suicidal ideation or homicidal ideation   All systems negative except as marked. PHYSICAL EXAM    VITAL SIGNS: /78   Pulse 77   Resp 20   Ht 5' 4\" (1.626 m)   Wt 240 lb (108.9 kg)   SpO2 99%   BMI 41.20 kg/m²    Constitutional:  Well developed, Well nourished, moderate acute distress, Non-toxic appearance.    HENT:  Normocephalic, Atraumatic, Bilateral external ears normal, Oropharynx moist, No oral exudates, Nose normal. Neck- Normal range of motion, No tenderness, Supple, No stridor. Eyes:  PERRL, EOMI, Conjunctiva normal, No discharge. Respiratory:  Normal breath sounds, No respiratory distress, No wheezing, left chest wall tenderness reproduces patient's symptoms  Cardiovascular:  Normal heart rate, Normal rhythm, No murmurs, No rubs, No gallops. GI:  Bowel sounds normal, Soft, No tenderness, No masses, No pulsatile masses. : External genitalia appear normal, No masses or lesions. No discharge. No CVA tenderness. Musculoskeletal:  Intact distal pulses, No edema, No tenderness, No cyanosis, No clubbing. Good range of motion in all major joints. No tenderness to palpation or major deformities noted. Back- No tenderness. Integument:  Warm, Dry, No erythema, No rash. Lymphatic:  No lymphadenopathy noted. Neurologic:  Alert & oriented x 3, Normal motor function, Normal sensory function, No focal deficits noted. Psychiatric:  Affect anxious l, Judgment normal, Mood normal.     EKG    NSR, HR 77 , right bundle branch block, normal Axis, T wave inversion in V1 V2 QTc 522    RADIOLOGY    XR CHEST PORTABLE   Final Result   ATELECTASIS, PATCHY INFILTRATE RIGHT LOWER LOBE. REEVALUATION   Patient was updated the results of labs and Radiology. Spoke with the hospitalist accepted patient admission.     Labs  Labs Reviewed   CBC WITH AUTO DIFFERENTIAL - Abnormal; Notable for the following components:       Result Value    Hemoglobin 11.7 (*)     Hematocrit 33.9 (*)     MCV 75.3 (*)     MCH 25.8 (*)     RDW 16.4 (*)     Neutrophils Absolute 8.0 (*)     All other components within normal limits   COMPREHENSIVE METABOLIC PANEL W/ REFLEX TO MG FOR LOW K - Abnormal; Notable for the following components:    Sodium 131 (*)     Glucose 220 (*)     BUN 44 (*)     CREATININE 1.88 (*)     GFR Non- 27.0 (*)     GFR  32.6 (*)     Globulin 3.7

## 2021-08-22 NOTE — CONSULTS
complete LAD stenosis continue to be poorly perfused which may contribute to some of her discomfort. Because of marked calcification angulation the proximal LAD could not be approached by angioplasty in April. May need to reconsider depending upon symptoms and clinical course continue dual antiplatelet therapy  3. Hypertension: She cannot tolerate ACE inhibitor secondary to a cough. I cannot see evidence of prior trials with AR-2 blocker but with current renal insufficiency and potassium upper normal would avoid at least at this point in time hence despite comments above may need to continue hydralazine. 4. Shortness of breath: Obtain echocardiogram as described he has had history of pulmonary hypertension which perhaps has worsened. She has no lower extremity edema or significant elevation of neck megalies at this point time. Also being evaluated for pneumonia    Chief Complaint/Active Symptoms: 73-year-old woman with complex cardiovascular and peripheral vascular disease. History is obtained via  service MR Christine CORONA Quincy Murdock. She says she was initially doing fair after the cardiac catheterization in April but still has not been feeling well. More recently she has noted exertional dyspnea when she exerts herself a feeling of pressure in her head and perhaps her jaw. Sometimes she says her mouth feels twisted when she walks. She presented to hospital however because she had very severe chest discomfort midsternal without particular radiation. Not associate with diaphoresis but it did make her short of breath and anxious. She is not had dizziness or syncope. She leads a fairly sedentary lifestyle. She notes she is much more fatigued when she exerts herself and thinks it may be related somewhat to her current medical regimen as she felt worse after the more recent change in medicines made in the cardiology office this past July. Review of Systems:   12 point review of systems negative.   No bleeding issues (Chip Portal) to Recorded   18. Levothyroxine Sodium 50 MCG Oral Tablet; 1 TABLET DAILY; Therapy: [id number] to Recorded   19. Meclizine HCl - 25 MG Oral Tablet; TAKE 1 TABLET 3 TIMES DAILY AS NEEDED; Therapy: (Chip Portal) to Recorded   20. Metoprolol Succinate ER 50 MG Oral Tablet Extended Release 24 Hour; TAKE 1 TABLET    TWICE A DAY  Requested for: [id number]; Last Rx:[id number] Ordered   21. Montelukast Sodium 10 MG Oral Tablet; TAKE 1 TABLET AT BEDTIME; Therapy: (Chip Portal) to Recorded   22. Nitroglycerin 0.4 MG Sublingual Tablet Sublingual; PLACE 1 TABLET UNDER THE    TONGUE EVERY 5 MINUTES FOR UP TO 3 DOSES AS NEEDED FOR CHEST    PAIN. CALL 911 IF PAIN PERSISTS  Requested for: [id number]; Last Rx:[id number]    Ordered   23. Prazosin HCl - 2 MG Oral Capsule; TAKE 1 CAPSULE EVERY 12 HOURS DAILY; Therapy: (Chip Portal) to Recorded   24. Ranolazine  MG Oral Tablet Extended Release 12 Hour; TAKE 1 TABLET EVERY    12 HOURS; Therapy: [id number] to Recorded   25. Restasis 0.05 % Ophthalmic Emulsion; INSTILL 1 DROP IN [facility] EYE TWICE DAILY; Therapy: [id number] to Recorded   26. Sertraline HCl - 100 MG Oral Tablet; TAKE 2 TABLETS DAILY; Therapy: (Chip Portal) to Recorded   27. Trulicity 1.79 AT/8.5WG Subcutaneous Solution Pen-injector; Inject 0.5Ml subcu nightly; Therapy: [id number] to Recorded    Reviewed meds with PT list.DS MA     Allergies   1. Ambien TABS   nervousness; Insomnia; Recorded By: Vera Paulino; 04/17/2019 3:02:20 PM   2. Capoten TABS   Cough; Palpitations; Recorded By: Vera Paulino; 04/17/2019 3:02:20 PM   3. Cogentin   Palpitations; Recorded By: Vera Paulino; 04/17/2019 3:02:20 PM   4. Geodon CAPS   vision problems; Sleeplessness; Recorded By: Vera Paulino; 04/17/2019 3:02:20 PM   5. KlonoPIN TABS   Palpitations; Recorded By: Vera Paulino; 04/17/2019 3:02:20 PM   6.  Navane CAPS   Headache; Recorded By: Vera Paulino; 04/17/2019 3:02:20 PM   7. Remeron   Nausea; Vomiting; Recorded By: Brook Fischer; 04/17/2019 3:02:20 PM   8. RisperDAL TABS   Nausea; Recorded By: Brook Fischer; 04/17/2019 3:02:20 PM   9. SEROquel TABS   Palpitations; Recorded By: Brook Fischer; 04/17/2019 3:02:20 PM   10. Abilify   Recorded By: Piedad Parker; 11/08/2019 11:02:43 AM   11. Depakote ER TB24   unsteady, falls; Recorded By: Brook Fischer; 04/17/2019 3:02:20 PM   12. Effexor TABS   patient states she felt high on medication; Recorded By: Brook Fischer; 04/17/2019 3:02:20 PM          Objective:   Vitals:    08/22/21 1980 08/22/21 0603 08/22/21 0609 08/22/21 0818   BP: (!) 114/98  108/60 120/83   Pulse: 71  67 66   Resp:   18    Temp: 98.2 °F (36.8 °C)  98.1 °F (36.7 °C)    TempSrc:       SpO2: 100%  90% 100%   Weight:  244 lb 7.8 oz (110.9 kg)     Height:          Wt Readings from Last 3 Encounters:   08/22/21 244 lb 7.8 oz (110.9 kg)   08/15/21 220 lb (99.8 kg)   06/29/21 240 lb (108.9 kg)       TELEMETRY: normal sinus                             Rhythm Strip: occ pvc    Physical Exam:  General Appearance: alert and oriented to person, place and time, in no acute distress, anxious  Cardiovascular: normal rate, regular rhythm, normal S1 and S2, no murmurs, rubs, clicks, or gallops,  no JVD  Pulmonary/Chest: clear to auscultation bilaterally- no wheezes, rales or rhonchi, normal air movement, no respiratory distress  Abdomen: soft, non-tender, non-distended, normal bowel sounds, no masses   Extremities: no cyanosis, clubbing or edema  Skin: warm and dry, no rash or erythema  Eyes: EOMI  Neck: supple and non-tender without mass, no thyromegaly   Neurological: alert, oriented, normal speech, no focal findings or movement disorder noted    Allergies:   Allergies   Allergen Reactions    Ambien [Zolpidem Tartrate]     Capoten [Captopril]     Clioquinol     Cogentin [Benztropine]     Depakote [Divalproex Sodium]     Effexor Xr [Venlafaxine Hcl Er]     Geodon [Ziprasidone Hcl]     Lisinopril      Hyperkalemia: 20 potassium was 6.7    Lyrica [Pregabalin]     Navane [Thiothixene]     Pamelor [Nortriptyline Hcl]     Remeron [Mirtazapine]     Risperdal [Risperidone]     Trazodone And Nefazodone     Wellbutrin [Bupropion]         Medications:    sodium chloride flush  5-40 mL Intravenous 2 times per day    enoxaparin  40 mg Subcutaneous Daily    [START ON 2021] levofloxacin  250 mg Intravenous Q24H    insulin glargine  50 Units Subcutaneous Nightly    insulin lispro  0-12 Units Subcutaneous TID WC    insulin lispro  0-6 Units Subcutaneous Nightly    amitriptyline  25 mg Oral Nightly    aspirin  81 mg Oral Daily    atorvastatin  80 mg Oral Nightly    folic acid  1 mg Oral Daily    furosemide  40 mg Oral Daily    gabapentin  600 mg Oral BID    haloperidol  5 mg Oral Daily    hydrALAZINE  50 mg Oral 3 times per day    hydrOXYzine  25 mg Oral TID    isosorbide dinitrate  20 mg Oral TID    levothyroxine  50 mcg Oral Daily    meclizine  25 mg Oral TID    metoprolol succinate  50 mg Oral BID    montelukast  10 mg Oral Nightly    neomycin-polymyxin-dexameth  1 drop Both Eyes 4 times per day    prazosin  2 mg Oral Nightly    ranolazine  500 mg Oral BID    sertraline  200 mg Oral Daily    ticagrelor  90 mg Oral BID    lidocaine  1 patch Transdermal Once      sodium chloride      dextrose       sodium chloride flush, 5-40 mL, PRN  sodium chloride, 25 mL, PRN  ondansetron, 4 mg, Q8H PRN   Or  ondansetron, 4 mg, Q6H PRN  acetaminophen, 650 mg, Q6H PRN   Or  acetaminophen, 650 mg, Q6H PRN  polyethylene glycol, 17 g, Daily PRN  glucose, 15 g, PRN  dextrose, 12.5 g, PRN  glucagon (rDNA), 1 mg, PRN  dextrose, 100 mL/hr, PRN        Diagnostics:  EK2021: Normal sinus rhythm right bundle branch block. Compared to study of  right bundle branch block is a new finding.     Echo: Pending    CXR:           Portable: Results for orders placed during the hospital encounter of 08/21/21    XR CHEST PORTABLE    Narrative  EXAMINATION: CHEST PORTABLE VIEW    CLINICAL HISTORY: Midsternal chest pain    COMPARISONS: April 6, 2021 0143 hours    FINDINGS:    Single  views of the chest is submitted. There are multiple median sternotomy wires. The cardiac silhouette is enlarged  Pulmonary vascular unremarkable. Right sided trachea. Atelectasis, patchy infiltrate right lower lobe. No Pneumothoraces. Impression  ATELECTASIS, PATCHY INFILTRATE RIGHT LOWER LOBE. Lab Data:    Cardiac Enzymes:  Recent Labs     08/21/21 2130 08/22/21 0348   TROPONINI 0.089* 0.079*     Component      Latest Ref Rng & Units 4/6/2021 4/6/2021 9/30/2020 9/1/2020           8:21 AM  2:00 AM  5:46 AM  8:07 PM   Troponin      0.000 - 0.010 ng/mL 0.335 (HH) 0.310 (HH) <0.010 <0.010     Component      Latest Ref Rng & Units 6/29/2020           6:36 AM   Troponin      0.000 - 0.010 ng/mL <0.010       ProBNP:   Lab Results   Component Value Date    PROBNP 3,242 08/21/2021       CBC:   Recent Labs     08/21/21 2130   WBC 10.0   RBC 4.51   HGB 11.7*   HCT 33.9*          CMP:    Recent Labs     08/21/21 2130 08/22/21 0347   * 134*   K 4.6 4.8   CL 96 99   CO2 24 23   BUN 44* 42*   CREATININE 1.88* 1.66*   GFRAA 32.6* 37.7*   LABGLOM 27.0* 31.1*   GLUCOSE 220* 185*   CALCIUM 9.4 8.9       Hepatic Function Panel:    Recent Labs     08/21/21 2130 08/22/21 0347   ALKPHOS 99 85   ALT 13 11   AST 18 16   PROT 7.5 6.4   BILITOT 0.3 <0.2   LABALBU 3.8 3.3*       Magnesium:  No results for input(s): MG in the last 72 hours.     PT/INR:    Recent Labs     08/21/21 2130   PROTIME 14.5   INR 1.1       Lipids:    Recent Labs     08/22/21 0347   CHOL 119   TRIG 73   HDL 43   LDLCALC 61       Principal Problem:    Chest pain  Active Problems:    Diabetes mellitus (HCC)    Hypertension    Hypothyroid    CAD (coronary artery disease)    Elevated troponin    Pneumonia    CKD (chronic kidney disease) stage 3, GFR 30-59 ml/min (HCC)    Pain in right hand  Resolved Problems:    * No resolved hospital problems.  *           Electronically signed by Maryann Rudd MD on 8/22/2021 at 9:40 AM

## 2021-08-22 NOTE — CARE COORDINATION
[facility] EMERGENCY [facility] Case Management Initial Discharge Assessment    Met with Patient to discuss discharge plan. I used  and spoke to Bea Middleton #[id number] to interpret. PCP: Enrrique Otto                                Date of Last Visit: Current     If no PCP, list provided? N/A    Discharge Planning    Living Arrangements: independently at home and at home dependent on family care    Who do you live with?      Who helps you with your care:  self, family or spouse    If lives at home:     Do you have any barriers navigating in your home? yes - Pt states she has difficulty with walking and would like a rollator and BSC. She wants therapy to see her and evaluate her needs. Patient can perform ADL? Yes    Current Services (outpatient and in home) :  None  - She states that she lives in apartment and does have a button to push for help like a life alert. Dialysis: No    Is transportation available to get to your appointments? Yes   - Son-in-law helps with transportation     DME Equipment:  yes - Rolling walker,grab bars,wheel chair,hosp bed     Respiratory equipment: Continuous Oxygen  2 Liters  and CPAP with 2 Liters of O2    Respiratory provider:  yes - Medical services     Pharmacy:  yes - [facility] [place] with Medication Assistance Program?  No      Patient agreeable to [address]? Yes, Company Pt states she has only lived here 2 years and a list would not help her. She does not know which company. I asked her if she had [employer] in past as on chart and she thinks she has. Patient agreeable to SNF/Rehab? Declined    Other discharge needs identified? N/A    Does Patient Have a High-Risk for Readmission Diagnosis (CHF, PN, MI, COPD)? No    Initial Discharge Plan? (Note: please see concurrent daily documentation for any updates after initial note). Plan is PT/OT [facility] and she would like HHC and a rollator/bsc for home.  She states that she did go to a NH in [facility] for a month and she does not want to go again. She states it makes her anxious and depressed. She states she had [address] over a yr ago when she had foot surg. She is not sure which machine she has but states a sleep apnea machine.      Readmission Risk              Risk of Unplanned Readmission:  0         Electronically signed by Gunner Theodore on 8/22/2021 at 1:29 PM

## 2021-08-22 NOTE — H&P
Independent    Transfer Assistance: Independent    Active : No    Additional Comments: Pt wears orthopedic shoes at baseline    The patient states she is current with [facility](RN per the ). The patient had a walker, but obtained a hospital bed, wheel chair, BSC and O2 last admission (from [address]). pharmacy is [address] [place] [address],[address] [id number]., [facility]. Transportation is provided by [employer]	[place] () per the patient     Social Determinants of Health     Financial Resource Strain:     Difficulty of Paying Living Expenses:    Food Insecurity:     Worried About Running Out of Food in the Last Year:     [address] in the Last Year:    Transportation Needs:     Lack of Transportation (Medical):  Lack of Transportation (Non-Medical):    Physical Activity:     Days of Exercise per Week:     Minutes of Exercise per Session:    Stress:     Feeling of Stress :    Social Connections:     Frequency of Communication with Friends and Family:     Frequency of Social Gatherings with Friends and Family:     Attends Yazidi Services:     Active Member of Clubs or Organizations:     Attends Club or Organization Meetings:     Marital Status:    Intimate Partner Violence:     Fear of Current or Ex-Partner:     Emotionally Abused:     Physically Abused:     Sexually Abused:      MEDICATIONS:   Prior to Admission medications    Medication Sig Start Date End Date Taking?  Authorizing Provider   levoFLOXacin (LEVAQUIN) 750 MG tablet Take 1 tablet by mouth daily for 7 days 8/15/21 8/22/21  Ton Guerrero MD   insulin lispro, 1 Unit Dial, (HUMALOG [facility]) 100 UNIT/ML SOPN 15  units at each meals 7/22/21   Miguel Chávez MD   albuterol sulfate HFA (VENTOLIN HFA) 108 (90 Base) MCG/ACT inhaler Inhale 2 puffs into the lungs as needed for Wheezing Every 4-6 hours PRN 6/29/21   Marielle Denson MD   albuterol (PROVENTIL) (2.5 MG/3ML) 0.083% nebulizer solution Take 3 mLs by nebulization every 6 hours as needed for Wheezing 6/29/21 7/29/21  Alona Boswell MD   montelukast (SINGULAIR) 10 MG tablet Take 1 tablet by mouth nightly 6/29/21   Alona Boswell MD   Continuous Blood Gluc Sensor (FREESTYLE FELY 14 DAY SENSOR) MISC Every 2 weeks 6/23/21   Jalil Guillen MD   Dulaglutide (TRULICITY) 2.90 AT/4.4MR SOPN Inject 0.75 mg into the skin once a week 6/23/21   Jalil Guillen MD   Insulin Pen Needle ([employer] PEN NEEDLES 31G) 31G X 8 MM MISC 1 each by Does not apply route 4 times daily 6/23/21   Jalil Guillen MD   ranolazine (RANEXA) 500 MG extended release tablet Take 1 tablet by mouth 2 times daily 4/7/21   Sebastien Pro MD   atorvastatin (LIPITOR) 40 MG tablet Take 2 tablets by mouth nightly 4/7/21   Aram Tsang MD   levothyroxine (SYNTHROID) 50 MCG tablet take 1 tablet by mouth once daily 3/24/21   Jalil Guillen MD   Alcohol Swabs (ALCOHOL PREP) 70 % PADS qid 3/24/21   Jalil Guillen MD   Insulin Pen Needle (NOVOFINE) 32G X 6 MM MISC qid 3/24/21   Jalil Guillen MD   insulin glargine (LANTUS SOLOSTAR) 100 UNIT/ML injection pen 50 units at bedtime 3/23/21   Jalil Guillen MD   oxybutynin (DITROPAN-XL) 5 MG extended release tablet take 1 tablet by mouth once daily 3/19/21   Charito Woo MD   RESTASIS 0.05 % ophthalmic emulsion  3/8/21   Historical Provider, MD   neomycin-polymyxin-dexameth (MAXITROL) 3.5-89738-2.1 ophthalmic suspension instill 1 drop into both eyes four times a day 1/5/21   Historical Provider, MD   ARTIFICIAL TEARS 1.4 % ophthalmic solution  3/8/21   Historical Provider, MD   docusate sodium (COLACE, DULCOLAX) 100 MG CAPS Take 100 mg by mouth 2 times daily 10/3/20   Felice Gilbert MD   prazosin (MINIPRESS) 2 MG capsule Take 2 mg by mouth nightly  9/14/20   Historical Provider, MD   sertraline (ZOLOFT) 100 MG tablet Take 200 mg by mouth daily  9/14/20   Historical Provider, MD   Continuous Blood Gluc Sensor (FREESTYLE FELY 14 DAY SENSOR) [facility] Every 2 weeks 9/21/20   Abel Lyndon Marshall MD   Continuous Blood Gluc  (FREESTYLE FELY 14 DAY READER) CATHLEEN As directed 9/21/20   Heath Pritchett MD   furosemide (LASIX) 40 MG tablet Take 1 tablet by mouth daily 9/4/20   Paramjit Jacobo MD   ticagrelor (BRILINTA) 90 MG TABS tablet Take 90 mg by mouth 2 times daily    Historical Provider, MD   CPAP Machine MISC by Does not apply route New CPAP with 10 cm 8/20/20   Lili Hernadez MD   aspirin 81 MG EC tablet Take 1 tablet by mouth daily 6/30/20   Won Alvarado MD   OXYGEN 2 lit O2 with sleep , please give O2 concentrator 6/12/20   Lili Hernadez MD   Emollient (EUCERIN INTENSIVE REPAIR HAND) 2.5-10 % CREA APPLY TO FEET AT BEDTIME; PLACE SOCKS OVER FEET AFTER APPLICATION IF NEEDED FOR DRY CRACKING FEET 3/15/20   Historical Provider, MD   hydrOXYzine (VISTARIL) 25 MG capsule Take 50 mg by mouth 3 times daily as needed  3/4/20   Historical Provider, MD   Nutritional Supplements ([address]) LIQD take as directed three times a day 6/1/20   Historical Provider, MD   insulin glargine (LANTUS) 100 UNIT/ML injection vial Inject 30 Units into the skin nightly  Patient taking differently: Inject 35 Units into the skin nightly  5/12/20   Ansley Lowery MD   isosorbide dinitrate (ISORDIL) 20 MG tablet Take 1 tablet by mouth 3 times daily 4/28/20   Won Alvarado MD   nitroGLYCERIN (NITROSTAT) 0.4 MG SL tablet up to max of 3 total doses. If no relief after 1 dose, call 911. 4/28/20   Won Alvarado MD   hydrALAZINE (APRESOLINE) 50 MG tablet Take 1 tablet by mouth every 8 hours 4/28/20   Won Alvarado MD   metoprolol succinate (TOPROL XL) 50 MG extended release tablet Take 1 tablet by mouth 2 times daily 4/28/20   Won Alvarado MD   amitriptyline (ELAVIL) 25 MG tablet Take 25 mg by mouth nightly    Historical Provider, MD   gabapentin (NEURONTIN) 400 MG capsule Take 400 mg by mouth 2 times daily.  AM and 800 mg in pm-9pm    Historical Provider, MD   haloperidol (HALDOL) 5 MG tablet Take 5 mg by mouth daily    Historical Provider, MD   FreeStyle Lancets MISC 1 each by Does not apply route daily 1/30/20   Evon Simms MD   blood glucose test strips (FREESTYLE LITE) strip 1 each by In Vitro route daily As needed. 1/30/20   Evon Simms MD   folic acid (FOLVITE) 1 MG tablet Take 1 mg by mouth daily    Historical Provider, MD   Iron Polysacch Aueyj-C64-SE (NIFEREX-150 FORTE PO) Take 1 tablet by mouth daily     Historical Provider, MD   Cyanocobalamin (VITAMIN B-12) 1000 MCG extended release tablet Take 1,000 mcg by mouth daily    Historical Provider, MD   meclizine (ANTIVERT) 25 MG tablet Take 25 mg by mouth three times daily    Historical Provider, MD       ALLERGIES: Ambien [zolpidem tartrate], Capoten [captopril], Clioquinol, Cogentin [benztropine], Depakote [divalproex sodium], Effexor xr [venlafaxine hcl er], Geodon [ziprasidone hcl], Lisinopril, Lyrica [pregabalin], Navane [thiothixene], Pamelor [nortriptyline hcl], Remeron [mirtazapine], Risperdal [risperidone], Trazodone and nefazodone, and Wellbutrin [bupropion]    REVIEW OF SYSTEM:   Review of Systems   Constitutional: Positive for fatigue. Negative for chills, diaphoresis and fever. HENT: Negative for sore throat and trouble swallowing. Eyes: Negative. Respiratory: Positive for shortness of breath. Negative for cough, chest tightness and wheezing. Cardiovascular: Positive for chest pain and leg swelling. Negative for palpitations and near-syncope. Gastrointestinal: Positive for vomiting. Negative for abdominal pain, constipation, diarrhea and nausea. Endocrine: Negative. Genitourinary: Negative for dysuria, flank pain and urgency. Musculoskeletal: Negative. Skin: Negative. Neurological: Positive for dizziness and numbness. Negative for syncope, speech difficulty, weakness and headaches. Psychiatric/Behavioral: The patient is nervous/anxious.          OBJECTIVE  PHYSICAL EXAM:   Physical Exam  Vitals (L) 12.0 - 16.0 g/dL    Hematocrit 33.9 (L) 37.0 - 47.0 %    MCV 75.3 (L) 82.0 - 100.0 fL    MCH 25.8 (L) 27.0 - 31.3 pg    MCHC 34.3 33.0 - 37.0 %    RDW 16.4 (H) 11.5 - 14.5 %    Platelets 393 364 - 186 K/uL    Neutrophils % 80.1 %    Lymphocytes % 12.6 %    Monocytes % 6.1 %    Eosinophils % 0.6 %    Basophils % 0.6 %    Neutrophils Absolute 8.0 (H) 1.4 - 6.5 K/uL    Lymphocytes Absolute 1.3 1.0 - 4.8 K/uL    Monocytes Absolute 0.6 0.2 - 0.8 K/uL    Eosinophils Absolute 0.1 0.0 - 0.7 K/uL    Basophils Absolute 0.1 0.0 - 0.2 K/uL   Comprehensive Metabolic Panel w/ Reflex to MG    Collection Time: 08/21/21  9:30 PM   Result Value Ref Range    Sodium 131 (L) 135 - 144 mEq/L    Potassium reflex Magnesium 4.6 3.4 - 4.9 mEq/L    Chloride 96 95 - 107 mEq/L    CO2 24 20 - 31 mEq/L    Anion Gap 11 9 - 15 mEq/L    Glucose 220 (H) 70 - 99 mg/dL    BUN 44 (H) 8 - 23 mg/dL    CREATININE 1.88 (H) 0.50 - 0.90 mg/dL    GFR Non-African American 27.0 (L) >60    GFR  32.6 (L) >60    Calcium 9.4 8.5 - 9.9 mg/dL    Total Protein 7.5 6.3 - 8.0 g/dL    Albumin 3.8 3.5 - 4.6 g/dL    Total Bilirubin 0.3 0.2 - 0.7 mg/dL    Alkaline Phosphatase 99 40 - 130 U/L    ALT 13 0 - 33 U/L    AST 18 0 - 35 U/L    Globulin 3.7 (H) 2.3 - 3.5 g/dL   Troponin    Collection Time: 08/21/21  9:30 PM   Result Value Ref Range    Troponin 0.089 (HH) 0.000 - 0.010 ng/mL   Brain Natriuretic Peptide    Collection Time: 08/21/21  9:30 PM   Result Value Ref Range    Pro-BNP 3,242 pg/mL   Protime-INR    Collection Time: 08/21/21  9:30 PM   Result Value Ref Range    Protime 14.5 12.3 - 14.9 sec    INR 1.1    APTT    Collection Time: 08/21/21  9:30 PM   Result Value Ref Range    aPTT 29.0 24.4 - 36.8 sec   D-Dimer, Quantitative    Collection Time: 08/21/21  9:30 PM   Result Value Ref Range    D-Dimer, Quant 0.37 0.00 - 0.50 mg/L FEU   Lipase    Collection Time: 08/21/21  9:30 PM   Result Value Ref Range    Lipase 25 12 - 95 U/L   POCT Glucose Collection Time: 08/22/21  1:05 AM   Result Value Ref Range    POC Glucose 175 (H) 60 - 115 mg/dl    Performed on ACCU-CHEK        IMAGING:  XR CHEST PORTABLE    Result Date: 8/21/2021  EXAMINATION: CHEST PORTABLE VIEW  CLINICAL HISTORY: Midsternal chest pain COMPARISONS: April 6, 2021 0143 hours  FINDINGS: Single  views of the chest is submitted. There are multiple median sternotomy wires. The cardiac silhouette is enlarged Pulmonary vascular unremarkable. Right sided trachea. Atelectasis, patchy infiltrate right lower lobe. No Pneumothoraces. ATELECTASIS, PATCHY INFILTRATE RIGHT LOWER LOBE.      VTE Prophylaxis: on Brilinta. ASSESSMENT AND PLAN  Principal Problem:    Chest pain   Chronic  On ranexa but increased frequency of episodes  Sharp substernal and left of sternum. Has increased SOB as well. Troponin elevation. History CAD s/p CABG and stent placement. Plan: admit  Serial troponin   Cardiology consult. Active Problems:    Pneumonia RLL,   Complains of SOB w minimal exertion, no fever, chills, or ill contacts. CXR w patchy infiltrate RLL. Plan: admit   levaquin   Acapella. Diabetes mellitus  humalog w meals,  Trulicity, lantus 53V nightly   Last A1c 7.5      Plan: Lantus 50u nightly,  SSI w meals. CAD (coronary artery disease)  Past CABG, stent placement 4/2020 chronic CP, on ranexa but increased fatigue, CP and SOB recently. On BB  Statin. Plan: continue meds. Cardiology to see. Elevated troponin  0.089   Chest pain, SOB   EKG w NSR, HR 77 , right bundle branch block, normal Axis, T wave inversion in V1 V2 QTc 522  Plan: Serial troponin, EKG in AM   Cardiology to see. CKD (chronic kidney disease) stage 3, GFR 30-59 ml/min ([facility]) B/ Scr 44 / 1.88    Baseline  Scr appears to be about 1.3   She has good UO. She appears a little dry on exam.  Received 1L fluids   Plan: monitor labs. Hypertension    135/100  No HA, blurred vision, dizziness. Anxious. Takes BB prazosin and lasix      Plan: continue meds once verified. Hypothyroid  Synthroid 50 mcg daily. Normal heart rate and rhythm   Last TSH 1.470  Plan: continue same. Pain right hand   Severe pain right hand   Has difficulty grasping. Appears to be in knuckles of thumb and 1st 2 fingers  Denies injury  Joints not warm or red. Plan: Xray right hand.         Plan of care discussed with: patient    SIGNATURE: ESDRAS Garcia - CNP  DATE: August 22, 2021  TIME: 2:06 AM   Kori Reyna MD  - supervising physician

## 2021-08-23 NOTE — PROGRESS NOTES
[id number] Progress Note      Date: 8/23/2021      Patient:  Caye Dandy       YOB: 1955  MRN: [id number]   Acct: [de-identified]    Primary Cardiologist:Dr. Ajit Armas    Reason for Consult: Chest pain    Rounding Cardiologist: Avila Montero MD     Subjective:     Patient states that she is better overall. She still has some shortness of breath. She still has some reproducible chest discomfort which is chronic. She tells me she has been told she has pneumonia. ECG is changed. Echo is pending. 14 system General and Cardiac ROS otherwise negative or unchanged. Impression:     1. Shortness of breath  2. Chest discomfort, chronic, reproducible, musculoskeletal  3. Elevated troponins, low flat pattern, doubt acute true myocardial infarction. 4. Pneumonia, right lower lobe  5. Coronary artery disease, post prior CABG, prior left main left circumflex stent, known 100% right coronary occlusion chronic, last cath 4/21 with patent stent and bypass, medical treatment. 6. Abnormal electrocardiogram with first-degree AV block and anterior MI.  7. Right bundle branch block, new, now resolved. 8. Hypertension  9. Hyperlipidemia  10. Renal insufficiency, chronic  11. Anemia  12. Pulmonary hypertension  13. Peripheral vascular disease, history of right toe amputation. 14. Schizoaffective disorder  15. Family history of CAD    Plan:     1. Cardiovascular supportive care  2. Primary care for management of pneumonia and other noncardiac issues. 3. Telemetry monitoring. 4. Serial ECGs and troponins. 5. Maximize medications as tolerated. 6. Perfusion stress testing, later possibly this admission, and possible cardiac catheterization as warranted. 7. Hydration  8. Further recommendations to follow. 9. See orders. -------------------------------------------------------------------------------------------------  Dr Kelechi Baxter Impression/Plan:     1.  Chest discomfort/elevated enzymes: In April cardiac enzymes were considerably more elevated but there have been no troponins between then and now. I cannot completely exclude a new episode of ischemia. Also of concern is new onset right bundle branch block. There are no acute ischemic changes associated with the bundle branch block at this time. Her chest tightness is now resolved. It is very unlikely the internal mammary artery has failed in the last 5 months. Coronary angiography at that time demonstrated widely patent left main/circumflex stent and left internal mammary artery bypass. The right coronary is chronically occluded and one would anticipate intermittent chest discomfort. She is on 2 vasodilators Minipress and hydralazine. Occasionally, excess arterial vasodilatation can contribute to coronary artery steal and angina. Perhaps further at adjusting medications would be to her interest.  I do not think significant abnormalities are likely to be found at repeat catheterization at least at this point in time. We will continue to follow serial ECG and enzymes if there is a significant peaking of troponin or marked change in ECG may have to again consider coronary angiography. Will increase ranolazine. Obtain echo to assure no new wall motion abnormalities. Simplify antihypertensive regimen and hopefully avoid Minipress and hydralazine. First reduce hydralazine  2. Coronary artery disease: Coronary angiogram this past April showed patent stents, LIMA and chronically occluded right. There is significant small vessel disease and the septal perforators between the proximal LAD stenosis and complete LAD stenosis continue to be poorly perfused which may contribute to some of her discomfort. Because of marked calcification angulation the proximal LAD could not be approached by angioplasty in April.   May need to reconsider depending upon symptoms and clinical course continue dual antiplatelet therapy  3. Hypertension: She cannot tolerate ACE inhibitor secondary to a cough. I cannot see evidence of prior trials with AR-2 blocker but with current renal insufficiency and potassium upper normal would avoid at least at this point in time hence despite comments above may need to continue hydralazine. 4. Shortness of breath: Obtain echocardiogram as described he has had history of pulmonary hypertension which perhaps has worsened. She has no lower extremity edema or significant elevation of neck megalies at this point time. Also being evaluated for pneumonia    Chief Complaint/Active Symptoms: 57-year-old woman with complex cardiovascular and peripheral vascular disease. History is obtained via  service [facility]. She says she was initially doing fair after the cardiac catheterization in April but still has not been feeling well. More recently she has noted exertional dyspnea when she exerts herself a feeling of pressure in her head and perhaps her jaw. Sometimes she says her mouth feels twisted when she walks. She presented to hospital however because she had very severe chest discomfort midsternal without particular radiation. Not associate with diaphoresis but it did make her short of breath and anxious. She is not had dizziness or syncope. She leads a fairly sedentary lifestyle. She notes she is much more fatigued when she exerts herself and thinks it may be related somewhat to her current medical regimen as she felt worse after the more recent change in medicines made in the cardiology office this past July. Review of Systems:   12 point review of systems negative. No bleeding issues she does have abdominal discomfort but no nausea or vomiting. CARDIAC HISTORY:  CAD: CABG 6/2019: L-LAD, PCI left circumflex and RCA.            4/6/2021 cardiac cath/NSTEMI: LVEF 50% apical hypokinesis. LVEDP 15. LM-10% patent stent. LAD-95% ostial 100% after SP2.   Attempted PTCA ostial LAD unsuccessful. CX-stents patent. RCA: Occluded with retrograde collateral.  L-LAD patent. Pulmonary hypertension: Echo September 2020 LVEF 60%. 1+ MR.  RVSP 44 mmHg  Severe peripheral vascular disease with prior right third toe amputation and right lower extremity revascularization multiple attempts    Past Medical History:   Diagnosis Date    Asthma     CAD (coronary artery disease)     CKD (chronic kidney disease) stage 3, GFR 30-59 ml/min ([facility])     Colitis     [facility] mellitus [facility]     Hyperlipidemia     Hypertension     PAD (peripheral artery disease) ([facility])     Prolonged emergence from general anesthesia     PVD (peripheral vascular disease) ([facility] [address] [facility]     Thyroid disease    Schizoaffective disorder    Family History   1. Family history of diabetes mellitus (V18.0) (Z83.3) : Mother, Sister   2. Family history of Hodgkin's lymphoma (V16.7) (Z80.7) : Father   3. Family history of hypertension (V17.49) (Z82.49) : Mother, Father, Sister, Brother   4. Family history of myocardial infarction (V17.3) (Z82.49) : Father   5. Family history of Lupus : Sister    Social History   · Daily caffeine consumption, 1 serving a day   · Never a smoker   · No alcohol use   · No illicit drug use    Surgical History   1. History of Toe amputation   2. History of Tooth extraction    Current Meds   1. Albuterol Sulfate  MCG/ACT AERS; INHALE 1 PUFF EVERY 4 HOURS AS   NEEDED; Therapy: (Waed Better) to Recorded   2. Ammonium Lactate 12 % External Lotion; APPLY  AND RUB  IN A THIN FILM TO   AFFECTED AREAS TWICE DAILY. (AM AND PM); Therapy: 12Jan2021 to Recorded   3. Artificial Tears 1.4 % Ophthalmic Solution; INSTILL 1-2 DROPS INTO AFFECTED EYE(S)    4 TIMES DAILY AS DIRECTED; Therapy: [id number] to Recorded   4. Aspirin 81 MG Oral Tablet Delayed Release; TAKE 1 TABLET DAILY  Requested for:   93Bts9356; Last Rx:[id number] Ordered   5. Atorvastatin Calcium 80 MG Oral Tablet; TAKE 1 TABLET AT BEDTIME;    Therapy: [id number] to (Evaluate:19Feb2022)  Requested for: 24Feb2021; Last   Rx:24Feb2021 Ordered   6. Brilinta 90 MG Oral Tablet; TAKE 1 TABLET TWICE DAILY; Therapy: 48Tti9304 to (Evaluate:19Feb2022)  Requested for: 24Feb2021; Last   Rx:24Feb2021 Ordered   7. Buprenorphine 7.5 MCG/HR Transdermal Patch Weekly; Therapy: 06LJT3433 to Recorded   8. Diclofenac Sodium 1 % External Gel; APPLY SPARINGLY TO AFFECTED AREA(S) ONCE   DAILY; Therapy: 58GWB9750 to Recorded   9.  MG Oral Capsule; take 1 cap daily; Therapy: 73QAN5840 to Recorded   10. Doxepin HCl - 10 MG Oral Capsule; TAKE 1 TO 2 CAPSULES AT BEDTIME AS NEEDED    FOR ITCHING; Therapy: 23MEQ2279 to Recorded   11. Furosemide 40 MG Oral Tablet; Take one tab daily; Therapy: 64Qph8083 to (Evaluate:19Feb2022)  Requested for: 24Feb2021; Last    Rx:24Feb2021 Ordered   12. Gabapentin 400 MG Oral Capsule; 1 Cap in the morning 2 at Night; Therapy: (Recorded:60Hft4220) to Recorded   13. Haloperidol 5 MG Oral Tablet; TAKE 1 TABLET TWICE DAILY; Therapy: (Recorded:62Ekk2098) to Recorded   14. hydrALAZINE HCl - 50 MG Oral Tablet; TAKE 1 TABLET 3 TIMES DAILY; Therapy: 76Uvw0390 to (Evaluate:19Feb2022)  Requested for: 24Feb2021; Last    Rx:24Feb2021 Ordered   15. hydrOXYzine HCl - 25 MG Oral Tablet; TAKE 1 TABLET 3 TIMES DAILY AS NEEDED; Therapy: (Kassy Avers) to Recorded   16. Isosorbide Dinitrate 20 MG Oral Tablet; TAKE 1 TABLET BY MOUTH EVERY 8 HOURS; Therapy: 36Htj4729 to (Evaluate:08Msc8151)  Requested for: 24Feb2021; Last    Rx:24Feb2021 Ordered   17. Lantus 100 UNIT/ML Subcutaneous Solution; INJECT SUBCUTANEOUSLY AS    DIRECTED; Therapy: (Kassy Avers) to Recorded   18. Levothyroxine Sodium 50 MCG Oral Tablet; 1 TABLET DAILY; Therapy: 20Pmv2538 to Recorded   19. Meclizine HCl - 25 MG Oral Tablet; TAKE 1 TABLET 3 TIMES DAILY AS NEEDED; Therapy: (Kassy Feng) to Recorded   20.  Metoprolol Succinate ER 50 MG Oral Tablet Extended Release 24 Hour; TAKE 1 TABLET    TWICE A DAY  Requested for: 91PTN1285; Last Rx:[id number] Ordered   21. Montelukast Sodium 10 MG Oral Tablet; TAKE 1 TABLET AT BEDTIME; Therapy: (Keyanna Schwab) to Recorded   22. Nitroglycerin 0.4 MG Sublingual Tablet Sublingual; PLACE 1 TABLET UNDER THE    TONGUE EVERY 5 MINUTES FOR UP TO 3 DOSES AS NEEDED FOR CHEST    PAIN. CALL 911 IF PAIN PERSISTS  Requested for: 94DMS7521; Last Rx:29Ink3206    Ordered   23. Prazosin HCl - 2 MG Oral Capsule; TAKE 1 CAPSULE EVERY 12 HOURS DAILY; Therapy: (Loreileen Schwab) to Recorded   24. Ranolazine  MG Oral Tablet Extended Release 12 Hour; TAKE 1 TABLET EVERY    12 HOURS; Therapy: 72VAJ7248 to Recorded   25. Restasis 0.05 % Ophthalmic Emulsion; INSTILL 1 DROP IN [facility] EYE TWICE DAILY; Therapy: 81VCL8302 to Recorded   26. Sertraline HCl - 100 MG Oral Tablet; TAKE 2 TABLETS DAILY; Therapy: (Keyanna Schwab) to Recorded   27. Trulicity 4.14 LP/1.7WV Subcutaneous Solution Pen-injector; Inject 0.5Ml subcu nightly; Therapy: 75WVZ3123 to Recorded    Reviewed meds with PT list.DS MA     Allergies   1. Ambien TABS   nervousness; Insomnia; Recorded By: Cm Gee; 04/17/2019 3:02:20 PM   2. Capoten TABS   Cough; Palpitations; Recorded By: Cm Gee; 04/17/2019 3:02:20 PM   3. Cogentin   Palpitations; Recorded By: Cm Gee; 04/17/2019 3:02:20 PM   4. Geodon CAPS   vision problems; Sleeplessness; Recorded By: Cm Gee; 04/17/2019 3:02:20 PM   5. KlonoPIN TABS   Palpitations; Recorded By: Cm Gee; 04/17/2019 3:02:20 PM   6. Navane CAPS   Headache; Recorded By: Cm Gee; 04/17/2019 3:02:20 PM   7. Remeron   Nausea; Vomiting; Recorded By: Cm Gee; 04/17/2019 3:02:20 PM   8. RisperDAL TABS   Nausea; Recorded By: Cm Gee; 04/17/2019 3:02:20 PM   9. SEROquel TABS   Palpitations; Recorded By: Cm Gee; 04/17/2019 3:02:20 PM   10.  Abilify   Recorded By: Vishal Monk; 11/08/2019 11:02:43 AM   11. Depakote ER TB24   unsteady, falls; Recorded By: Demarcus Cross; 04/17/2019 3:02:20 PM   12. Effexor TABS   patient states she felt high on medication; Recorded By: Demarcus Cross; 04/17/2019 3:02:20 PM          Objective:   Vitals:    08/22/21 1910 08/22/21 2151 08/22/21 2201 08/23/21 0517   BP: 114/81  (!) 123/56    Pulse: 71 71 72    Resp: 18      Temp: 98.8 °F (37.1 °C)      TempSrc: Oral      SpO2: 100%  99%    Weight:    242 lb 15.2 oz (110.2 kg)   Height:          Wt Readings from Last 3 Encounters:   08/23/21 242 lb 15.2 oz (110.2 kg)   08/15/21 220 lb (99.8 kg)   06/29/21 240 lb (108.9 kg)       TELEMETRY: normal sinus                             Rhythm Strip: occ pvc    Physical Exam:  General Appearance: alert and oriented to person, place and time, in no acute distress, anxious  Cardiovascular: normal rate, regular rhythm, normal S1 and S2, no murmurs, rubs, clicks, or gallops,  no JVD  Pulmonary/Chest: clear to auscultation bilaterally- no wheezes, rales or rhonchi, normal air movement, no respiratory distress  Abdomen: soft, non-tender, non-distended, normal bowel sounds, no masses   Extremities: no cyanosis, clubbing or edema  Skin: warm and dry, no rash or erythema  Eyes: EOMI  Neck: supple and non-tender without mass, no thyromegaly   Neurological: alert, oriented, normal speech, no focal findings or movement disorder noted    Allergies:   Allergies   Allergen Reactions    Ambien [Zolpidem Tartrate]     Capoten [Captopril]     Clioquinol     Cogentin [Benztropine]     Depakote [Divalproex Sodium]     Effexor Xr [Venlafaxine Hcl Er]     Geodon [Ziprasidone Hcl]     Lisinopril      Hyperkalemia: 4/21/20 potassium was 6.7    Lyrica [Pregabalin]     Navane [Thiothixene]     Pamelor [Nortriptyline Hcl]     Remeron [Mirtazapine]     Risperdal [Risperidone]     Trazodone And Nefazodone     Wellbutrin [Bupropion]         Medications:    sodium chloride flush  5-40 mL Intravenous 2 times per day    enoxaparin  40 mg Subcutaneous Daily    levofloxacin  250 mg Intravenous Q24H    insulin glargine  50 Units Subcutaneous Nightly    insulin lispro  0-12 Units Subcutaneous TID WC    insulin lispro  0-6 Units Subcutaneous Nightly    amitriptyline  25 mg Oral Nightly    aspirin  81 mg Oral Daily    atorvastatin  80 mg Oral Nightly    folic acid  1 mg Oral Daily    furosemide  40 mg Oral Daily    gabapentin  600 mg Oral BID    haloperidol  5 mg Oral Daily    hydrOXYzine  25 mg Oral TID    levothyroxine  50 mcg Oral Daily    meclizine  25 mg Oral TID    montelukast  10 mg Oral Nightly    neomycin-polymyxin-dexameth  1 drop Both Eyes 4 times per day    prazosin  2 mg Oral Nightly    sertraline  200 mg Oral Daily    ticagrelor  90 mg Oral BID    ranolazine  1,000 mg Oral BID    metoprolol succinate  100 mg Oral Daily    isosorbide mononitrate  30 mg Oral Daily    hydrALAZINE  50 mg Oral 2 times per day      sodium chloride      dextrose       sodium chloride flush, 5-40 mL, PRN  sodium chloride, 25 mL, PRN  ondansetron, 4 mg, Q8H PRN   Or  ondansetron, 4 mg, Q6H PRN  acetaminophen, 650 mg, Q6H PRN   Or  acetaminophen, 650 mg, Q6H PRN  polyethylene glycol, 17 g, Daily PRN  glucose, 15 g, PRN  dextrose, 12.5 g, PRN  glucagon (rDNA), 1 mg, PRN  dextrose, 100 mL/hr, PRN        Diagnostics:      EK/23/21: no further RBBB.    2021: Normal sinus rhythm right bundle branch block. Compared to study of  right bundle branch block is a new finding. Echo: Pending    CXR:           Portable: Results for orders placed during the hospital encounter of 21    XR CHEST PORTABLE    Narrative  EXAMINATION: CHEST PORTABLE VIEW    CLINICAL HISTORY: Midsternal chest pain    COMPARISONS: 2021 0143 hours    FINDINGS:    Single  views of the chest is submitted. There are multiple median sternotomy wires.  The cardiac silhouette is enlarged  Pulmonary vascular unremarkable. Right sided trachea. Atelectasis, patchy infiltrate right lower lobe. No Pneumothoraces. Impression  ATELECTASIS, PATCHY INFILTRATE RIGHT LOWER LOBE. Lab Data:    Cardiac Enzymes:  Recent Labs     08/21/21 2130 08/22/21 0348 08/22/21  1114   TROPONINI 0.089* 0.079* 0.080*     Component      Latest Ref Rng & Units 4/6/2021 4/6/2021 9/30/2020 9/1/2020           8:21 AM  2:00 AM  5:46 AM  8:07 PM   Troponin      0.000 - 0.010 ng/mL 0.335 (HH) 0.310 (HH) <0.010 <0.010     Component      Latest Ref Rng & Units 6/29/2020           6:36 AM   Troponin      0.000 - 0.010 ng/mL <0.010       ProBNP:   Lab Results   Component Value Date    PROBNP 3,242 08/21/2021       CBC:   Recent Labs     08/21/21 2130 08/23/21  0530   WBC 10.0 7.2   RBC 4.51 4.14*   HGB 11.7* 10.7*   HCT 33.9* 31.8*    175       CMP:    Recent Labs     08/21/21 2130 08/22/21 0347 08/23/21  0530   * 134* 140   K 4.6 4.8 4.3   CL 96 99 104   CO2 24 23 27   BUN 44* 42* 35*   CREATININE 1.88* 1.66* 1.56*   GFRAA 32.6* 37.7* 40.5*   LABGLOM 27.0* 31.1* 33.4*   GLUCOSE 220* 185* 62*   CALCIUM 9.4 8.9 8.4*       Hepatic Function Panel:    Recent Labs     08/21/21 2130 08/22/21 0347 08/23/21  0530   ALKPHOS 99 85 81   ALT 13 11 11   AST 18 16 16   PROT 7.5 6.4 6.6   BILITOT 0.3 <0.2 0.4   LABALBU 3.8 3.3* 3.5       Magnesium:  No results for input(s): MG in the last 72 hours. PT/INR:    Recent Labs     08/21/21 2130   PROTIME 14.5   INR 1.1       Lipids:    Recent Labs     08/22/21 0347 08/23/21  0530   CHOL 119 101   TRIG 73 52   HDL 43 44   LDLCALC 61 47       Principal Problem:    Chest pain  Active Problems:    Diabetes mellitus (HCC)    Hypertension    Hypothyroid    CAD (coronary artery disease)    Elevated troponin    Pneumonia    CKD (chronic kidney disease) stage 3, GFR 30-59 ml/min (HCC)    Pain in right hand  Resolved Problems:    * No resolved hospital problems.  *           Electronically signed by Anastacio Eng MD on 8/23/2021 at 10:55 AM

## 2021-08-24 NOTE — PROGRESS NOTES
Hospitalist Daily Progress Note  Name: Oxana Villa  Age: 63 y.o. Gender: female  CodeStatus: Full Code  Allergies: Ambien [Zolpidem Tartrate]  Capoten [Captopril]  Clioquinol  Cogentin [Benztropine]  Depakote [Divalproex Sodium]  Effexor Xr [Venlafaxine Hcl Er]  Geodon [Ziprasidone Hcl]  Lisinopril  Lyrica [Pregabalin]  Navane [Thiothixene]  Pamelor [Nortriptyline Hcl]  Remeron [Mirtazapine]  Risperdal [Risperidone]  Trazodone And Nefazodone  Wellbutrin [Bupropion]    Chief Complaint:Chest Pain (chest pain, shortness of breath, and dizzy x 1 week, worse today)      Primary Care Provider: Aviva Gamble    InpatientTreatment Team: Treatment Team: Attending Provider: Criss Connors MD; Consulting Physician: Aram Tsang MD; Utilization Reviewer: Marybeth James RN; Registered Nurse: Carla Collier. Estephanie Junior RN; Patient Care Tech: Bailey Morillo; : ALMAZ Esquivel; : Briseyda Erwin RN    Admission Date: 8/21/2021      Subjective: No nausea. Minimal substernal chest pain, mild sob with activity. Physical Exam  Vitals and nursing note reviewed. Constitutional:       Appearance: Normal appearance. Cardiovascular:      Rate and Rhythm: Normal rate and regular rhythm. Pulmonary:      Effort: Pulmonary effort is normal.      Breath sounds: Normal breath sounds. Abdominal:      General: Bowel sounds are normal.      Palpations: Abdomen is soft. Musculoskeletal:         General: Normal range of motion. Skin:     General: Skin is warm and dry. Neurological:      Mental Status: She is alert and oriented to person, place, and time. Mental status is at baseline.          Medications:  Reviewed    Infusion Medications:    sodium chloride      dextrose       Scheduled Medications:    sodium chloride flush  5-40 mL IntraVENous 2 times per day    enoxaparin  40 mg Subcutaneous Daily    levofloxacin  250 mg IntraVENous Q24H    insulin glargine  50 Units Subcutaneous Nightly    insulin lispro  0-12 Units Subcutaneous TID WC    insulin lispro  0-6 Units Subcutaneous Nightly    amitriptyline  25 mg Oral Nightly    aspirin  81 mg Oral Daily    atorvastatin  80 mg Oral Nightly    folic acid  1 mg Oral Daily    furosemide  40 mg Oral Daily    gabapentin  600 mg Oral BID    haloperidol  5 mg Oral Daily    hydrOXYzine  25 mg Oral TID    levothyroxine  50 mcg Oral Daily    meclizine  25 mg Oral TID    montelukast  10 mg Oral Nightly    neomycin-polymyxin-dexameth  1 drop Both Eyes 4 times per day    prazosin  2 mg Oral Nightly    sertraline  200 mg Oral Daily    ticagrelor  90 mg Oral BID    ranolazine  1,000 mg Oral BID    metoprolol succinate  100 mg Oral Daily    isosorbide mononitrate  30 mg Oral Daily    hydrALAZINE  50 mg Oral 2 times per day     PRN Meds: sodium chloride flush, sodium chloride, ondansetron **OR** ondansetron, acetaminophen **OR** acetaminophen, polyethylene glycol, glucose, dextrose, glucagon (rDNA), dextrose    Labs:   Recent Labs     08/21/21 2130 08/23/21  0530 08/24/21  0603   WBC 10.0 7.2 8.6   HGB 11.7* 10.7* 10.5*   HCT 33.9* 31.8* 31.2*    175 163     Recent Labs     08/22/21 0347 08/22/21  0347 08/23/21  0530 08/24/21  0603   *  --  140 136   K 4.8   < > 4.3 4.3  4.3   CL 99  --  104 99   CO2 23  --  27 26   BUN 42*  --  35* 31*   CREATININE 1.66*  --  1.56* 1.60*   CALCIUM 8.9  --  8.4* 8.6    < > = values in this interval not displayed.      Recent Labs     08/22/21  0347 08/23/21  0530 08/24/21  0603   AST 16 16 20   ALT 11 11 16   BILITOT <0.2 0.4 0.5   ALKPHOS 85 81 85     Recent Labs     08/21/21  2130   INR 1.1     Recent Labs     08/22/21  0348 08/22/21  1114 08/24/21  0603   TROPONINI 0.079* 0.080* 0.045*       Urinalysis:   Lab Results   Component Value Date    NITRU Negative 04/06/2021    BLOODU Negative 04/06/2021    SPECGRAV 1.015 04/06/2021    GLUCOSEU Negative 04/06/2021 Radiology:   Most recent    Chest CT      WITH CONTRAST:No results found for this or any previous visit. WITHOUT CONTRAST: No results found for this or any previous visit. CXR      2-view: Results for orders placed during the hospital encounter of 20    XR CHEST STANDARD (2 VW)    Narrative  XR CHEST (2 VW) : 2020    CLINICAL HISTORY:  SOB .    COMPARISON: 2020. TECHNIQUE: Upright AP and lateral radiographs of the chest were obtained. FINDINGS:    A shallow inspiration is present without significant infiltrate identified. The heart remains mildly enlarged with postoperative changes from previous CABG. There is no significant pleural effusion, vascular congestion, pneumothorax, or displaced fractures identified. Impression  NO EVIDENCE OF ACTIVE CARDIOPULMONARY DISEASE. Results for orders placed during the hospital encounter of 20    XR CHEST STANDARD (2 VW)    Narrative  Patient MRN: [id number]    : 1955    Age:  60 years    Gender: Female    Order Date: 2020 7:00 AM.    Exam: XR CHEST (2 VW)    Number of Views: 2    Indication:  Shortness of breath and pulmonary hypertension. 60-year-old female presenting to emergency department with shortness of breath times several months, fall/syncope at home and evaluation of mental status changes    Comparison: 2020    Findings: Stable, enlarged cardiomediastinal silhouette with underlying pulmonary edema and nonspecific bibasilar airspace disease. Median sternotomy wires. Vascular calcifications thoracic aorta. No pneumothorax. Impression  Impression:  1. Stable, enlarged cardiac mediastinal silhouette with underlying pulmonary edema  10. . Nonspecific bibasilar airspace disease, findings can be seen in infiltrate/pneumonia and/or atelectasis. .       Portable: Results for orders placed during the hospital encounter of 21    XR CHEST PORTABLE    Narrative  EXAMINATION: CHEST PORTABLE VIEW    CLINICAL HISTORY: Midsternal chest pain    COMPARISONS: April 6, 2021 0143 hours    FINDINGS:    Single  views of the chest is submitted. There are multiple median sternotomy wires. The cardiac silhouette is enlarged  Pulmonary vascular unremarkable. Right sided trachea. Atelectasis, patchy infiltrate right lower lobe. No Pneumothoraces. Impression  ATELECTASIS, PATCHY INFILTRATE RIGHT LOWER LOBE. Echo No results found for this or any previous visit. Assessment/Plan:    Active Hospital Problems    Diagnosis Date Noted    Elevated troponin [R77.8] 08/22/2021    Pneumonia [J18.9] 08/22/2021    CKD (chronic kidney disease) stage 3, GFR 30-59 ml/min ([facility]) [N18.30]     Pain in right hand [M79.641]     NSTEMI (non-ST elevated myocardial infarction) ([facility] [address] [I21.4] 04/06/2021    Chest pain [R07.9] 02/11/2020    CAD (coronary artery disease) [I25.10] 06/28/2019    Diabetes mellitus ([facility] [address] [E11.9] 02/25/2019    Hypertension [I10] 02/25/2019    Hypothyroid [E03.9] 02/25/2019     1 - NSTEMI   Likely chronic changes to troponin as well as strain, cardiology following.     2 -  RLL PNA   Levaquin    3 - DM2    SSI, ac/hs checks    4 - CAD hx   Cont home meds, cardiology following    5 - HTN/hypothyroidism   Cont home meds    6 - DVT proph    Electronically signed by Jacquelyn Reddy MD on 8/24/2021 at 2:18 PM

## 2021-08-24 NOTE — PROGRESS NOTES
[facility] Progress Note      Date: 8/24/2021      Patient:  Esthela Beltran       YOB: 1955  MRN: [id number]   Acct: [de-identified]    Primary Cardiologist:Dr. Slick Gutiérrez    Reason for Consult: Chest pain    Rounding Cardiologist: Rohit Armijo MD     Subjective:     Patient states that she is better overall. She still has some shortness of breath. She still has some reproducible chest discomfort which is chronic. She tells me she has been told she has pneumonia. ECG is changed. Echo is pending. 14 system General and Cardiac ROS otherwise negative or unchanged. Impression:     1. Shortness of breath  2. Chest discomfort, chronic, reproducible, musculoskeletal  3. Elevated troponins, low flat pattern, doubt acute true myocardial infarction. 4. Pneumonia, right lower lobe  5. Coronary artery disease, post prior CABG, prior left main left circumflex stent, known 100% right coronary occlusion chronic, last cath 4/21 with patent stent and bypass, medical treatment. 6. Ischemic Cardiomyopathy, with anterior hypokinesis, LVEF 35-40%. 7. Abnormal electrocardiogram with first-degree AV block and anterior MI.  8. Right bundle branch block, new, alternating, now resolved. 9. Hypertension  10. Hyperlipidemia  11. Renal insufficiency, chronic  12. Anemia  13. Pulmonary hypertension  14. Peripheral vascular disease, history of right toe amputation. 15. Schizoaffective disorder  16. Family history of CAD    Plan:     1. Cardiovascular supportive care  2. Primary care for management of pneumonia and other noncardiac issues. 3. Telemetry monitoring. 4. Serial ECGs and troponins. 5. Maximize medications as tolerated. 6. Perfusion stress testing, in AM, possible cardiac catheterization as warranted. 7. Hydration  8. Further recommendations to follow.   9. See orders. -------------------------------------------------------------------------------------------------  Dr Kaykay Mason Impression/Plan:     1. Chest discomfort/elevated enzymes: In April cardiac enzymes were considerably more elevated but there have been no troponins between then and now. I cannot completely exclude a new episode of ischemia. Also of concern is new onset right bundle branch block. There are no acute ischemic changes associated with the bundle branch block at this time. Her chest tightness is now resolved. It is very unlikely the internal mammary artery has failed in the last 5 months. Coronary angiography at that time demonstrated widely patent left main/circumflex stent and left internal mammary artery bypass. The right coronary is chronically occluded and one would anticipate intermittent chest discomfort. She is on 2 vasodilators Minipress and hydralazine. Occasionally, excess arterial vasodilatation can contribute to coronary artery steal and angina. Perhaps further at adjusting medications would be to her interest.  I do not think significant abnormalities are likely to be found at repeat catheterization at least at this point in time. We will continue to follow serial ECG and enzymes if there is a significant peaking of troponin or marked change in ECG may have to again consider coronary angiography. Will increase ranolazine. Obtain echo to assure no new wall motion abnormalities. Simplify antihypertensive regimen and hopefully avoid Minipress and hydralazine. First reduce hydralazine  2. Coronary artery disease: Coronary angiogram this past April showed patent stents, LIMA and chronically occluded right. There is significant small vessel disease and the septal perforators between the proximal LAD stenosis and complete LAD stenosis continue to be poorly perfused which may contribute to some of her discomfort.   Because of marked calcification angulation the proximal LAD could not be approached by angioplasty in April. May need to reconsider depending upon symptoms and clinical course continue dual antiplatelet therapy  3. Hypertension: She cannot tolerate ACE inhibitor secondary to a cough. I cannot see evidence of prior trials with AR-2 blocker but with current renal insufficiency and potassium upper normal would avoid at least at this point in time hence despite comments above may need to continue hydralazine. 4. Shortness of breath: Obtain echocardiogram as described he has had history of pulmonary hypertension which perhaps has worsened. She has no lower extremity edema or significant elevation of neck megalies at this point time. Also being evaluated for pneumonia    Chief Complaint/Active Symptoms: 79-year-old woman with complex cardiovascular and peripheral vascular disease. History is obtained via  service [facility]. She says she was initially doing fair after the cardiac catheterization in April but still has not been feeling well. More recently she has noted exertional dyspnea when she exerts herself a feeling of pressure in her head and perhaps her jaw. Sometimes she says her mouth feels twisted when she walks. She presented to hospital however because she had very severe chest discomfort midsternal without particular radiation. Not associate with diaphoresis but it did make her short of breath and anxious. She is not had dizziness or syncope. She leads a fairly sedentary lifestyle. She notes she is much more fatigued when she exerts herself and thinks it may be related somewhat to her current medical regimen as she felt worse after the more recent change in medicines made in the cardiology office this past July. Review of Systems:   12 point review of systems negative. No bleeding issues she does have abdominal discomfort but no nausea or vomiting.     CARDIAC HISTORY:  CAD: CABG 6/2019: L-LAD, PCI left circumflex and RCA.            4/6/2021 cardiac cath/NSTEMI: LVEF 50% apical hypokinesis. LVEDP 15. LM-10% patent stent. LAD-95% ostial 100% after SP2. Attempted PTCA ostial LAD unsuccessful. CX-stents patent. RCA: Occluded with retrograde collateral.  L-LAD patent. Pulmonary hypertension: Echo September 2020 LVEF 60%. 1+ MR.  RVSP 44 mmHg  Severe peripheral vascular disease with prior right third toe amputation and right lower extremity revascularization multiple attempts    Past Medical History:   Diagnosis Date    Asthma     CAD (coronary artery disease)     CKD (chronic kidney disease) stage 3, GFR 30-59 ml/min ([facility])     Colitis     Diabetes mellitus ([facility] [address]     Hyperlipidemia     Hypertension     PAD (peripheral artery disease) ([facility])     Prolonged emergence from general anesthesia     PVD (peripheral vascular disease) ([facility] [address]     Thyroid disease    Schizoaffective disorder    Family History   1. Family history of diabetes mellitus (V18.0) (Z83.3) : Mother, Sister   2. Family history of Hodgkin's lymphoma (V16.7) (Z80.7) : Father   3. Family history of hypertension (V17.49) (Z82.49) : Mother, Father, Sister, Brother   4. Family history of myocardial infarction (V17.3) (Z82.49) : Father   5. Family history of Lupus : Sister    Social History   · Daily caffeine consumption, 1 serving a day   · Never a smoker   · No alcohol use   · No illicit drug use    Surgical History   1. History of Toe amputation   2. History of Tooth extraction    Current Meds   1. Albuterol Sulfate  MCG/ACT AERS; INHALE 1 PUFF EVERY 4 HOURS AS   NEEDED; Therapy: (Gerard Cluster) to Recorded   2. Ammonium Lactate 12 % External Lotion; APPLY  AND RUB  IN A THIN FILM TO   AFFECTED AREAS TWICE DAILY. (AM AND PM); Therapy: 12Jan2021 to Recorded   3. Artificial Tears 1.4 % Ophthalmic Solution; INSTILL 1-2 DROPS INTO AFFECTED EYE(S)    4 TIMES DAILY AS DIRECTED; Therapy: [id number] to Recorded   4.  Aspirin 81 MG Oral Tablet Delayed Release; TAKE 1 TABLET DAILY Requested for:   24Feb2021; Last Rx:24Feb2021 Ordered   5. Atorvastatin Calcium 80 MG Oral Tablet; TAKE 1 TABLET AT BEDTIME; Therapy: 89Bhc8810 to (Evaluate:33Jgn1323)  Requested for: 24Feb2021; Last   Rx:24Feb2021 Ordered   6. Brilinta 90 MG Oral Tablet; TAKE 1 TABLET TWICE DAILY; Therapy: 81Zox2728 to (Evaluate:75Jfz3356)  Requested for: 24Feb2021; Last   Rx:24Feb2021 Ordered   7. Buprenorphine 7.5 MCG/HR Transdermal Patch Weekly; Therapy: 95JKV0334 to Recorded   8. Diclofenac Sodium 1 % External Gel; APPLY SPARINGLY TO AFFECTED AREA(S) ONCE   DAILY; Therapy: 69IXQ9153 to Recorded   9.  MG Oral Capsule; take 1 cap daily; Therapy: 39MZR1989 to Recorded   10. Doxepin HCl - 10 MG Oral Capsule; TAKE 1 TO 2 CAPSULES AT BEDTIME AS NEEDED    FOR ITCHING; Therapy: 51TVU2399 to Recorded   11. Furosemide 40 MG Oral Tablet; Take one tab daily; Therapy: 91Stk3701 to (Evaluate:19Feb2022)  Requested for: 24Feb2021; Last    Rx:24Feb2021 Ordered   12. Gabapentin 400 MG Oral Capsule; 1 Cap in the morning 2 at Night; Therapy: (Recorded:71Cqi4836) to Recorded   13. Haloperidol 5 MG Oral Tablet; TAKE 1 TABLET TWICE DAILY; Therapy: (Recorded:69Pcr7186) to Recorded   14. hydrALAZINE HCl - 50 MG Oral Tablet; TAKE 1 TABLET 3 TIMES DAILY; Therapy: 91Hyq8159 to (Evaluate:16Reo0267)  Requested for: 24Feb2021; Last    Rx:24Feb2021 Ordered   15. hydrOXYzine HCl - 25 MG Oral Tablet; TAKE 1 TABLET 3 TIMES DAILY AS NEEDED; Therapy: (Caprice Simmonsfelter) to Recorded   16. Isosorbide Dinitrate 20 MG Oral Tablet; TAKE 1 TABLET BY MOUTH EVERY 8 HOURS; Therapy: 17Cxl3165 to (Evaluate:85Xfb0095)  Requested for: 24Feb2021; Last    Rx:24Feb2021 Ordered   17. Lantus 100 UNIT/ML Subcutaneous Solution; INJECT SUBCUTANEOUSLY AS    DIRECTED; Therapy: (Caprice Simmonsfelter) to Recorded   18. Levothyroxine Sodium 50 MCG Oral Tablet; 1 TABLET DAILY; Therapy: 95Sor7134 to Recorded   19.  Meclizine HCl - 25 MG Oral Tablet; TAKE 1 TABLET 3 TIMES DAILY AS NEEDED; Therapy: (Chip Portal) to Recorded   20. Metoprolol Succinate ER 50 MG Oral Tablet Extended Release 24 Hour; TAKE 1 TABLET    TWICE A DAY  Requested for: 35ONO4468; Last Rx:[id number] Ordered   21. Montelukast Sodium 10 MG Oral Tablet; TAKE 1 TABLET AT BEDTIME; Therapy: (Chip Portal) to Recorded   22. Nitroglycerin 0.4 MG Sublingual Tablet Sublingual; PLACE 1 TABLET UNDER THE    TONGUE EVERY 5 MINUTES FOR UP TO 3 DOSES AS NEEDED FOR CHEST    PAIN. CALL 911 IF PAIN PERSISTS  Requested for: 24JOA5329; Last Rx:[id number]    Ordered   23. Prazosin HCl - 2 MG Oral Capsule; TAKE 1 CAPSULE EVERY 12 HOURS DAILY; Therapy: (Chip Portal) to Recorded   24. Ranolazine  MG Oral Tablet Extended Release 12 Hour; TAKE 1 TABLET EVERY    12 HOURS; Therapy: 83DSY6967 to Recorded   25. Restasis 0.05 % Ophthalmic Emulsion; INSTILL 1 DROP IN [facility] EYE TWICE DAILY; Therapy: 62AXN4015 to Recorded   26. Sertraline HCl - 100 MG Oral Tablet; TAKE 2 TABLETS DAILY; Therapy: (Chip Portal) to Recorded   27. Trulicity 6.08 AM/8.9NM Subcutaneous Solution Pen-injector; Inject 0.5Ml subcu nightly; Therapy: 15LFL2317 to Recorded    Reviewed meds with PT list.DS MA     Allergies   1. Ambien TABS   nervousness; Insomnia; Recorded By: Vera Paulino; 04/17/2019 3:02:20 PM   2. Capoten TABS   Cough; Palpitations; Recorded By: Vera Paulino; 04/17/2019 3:02:20 PM   3. Cogentin   Palpitations; Recorded By: Vera Paulino; 04/17/2019 3:02:20 PM   4. Geodon CAPS   vision problems; Sleeplessness; Recorded By: Vera Paulino; 04/17/2019 3:02:20 PM   5. KlonoPIN TABS   Palpitations; Recorded By: Vera Paulino; 04/17/2019 3:02:20 PM   6. Navane CAPS   Headache; Recorded By: Vera Paulino; 04/17/2019 3:02:20 PM   7. Remeron   Nausea; Vomiting; Recorded By: Vera Paulino; 04/17/2019 3:02:20 PM   8. RisperDAL TABS   Nausea; Recorded By: Vera Paulino; 04/17/2019 3:02:20 PM   9. SEROquel TABS   Palpitations; Recorded By: Megan Rivas; 04/17/2019 3:02:20 PM   10. Abilify   Recorded By: Manuela Finch; 11/08/2019 11:02:43 AM   11. Depakote ER TB24   unsteady, falls; Recorded By: Megan Rivas; 04/17/2019 3:02:20 PM   12. Effexor TABS   patient states she felt high on medication; Recorded By: Megan Rivas; 04/17/2019 3:02:20 PM          Objective:   Vitals:    08/24/21 0135 08/24/21 0506 08/24/21 0547 08/24/21 0717   BP: (!) 126/43  (!) 118/37 (!) 115/33   Pulse: 64  65 66   Resp:    18   Temp: 98.6 °F (37 °C)  98.2 °F (36.8 °C) 97.5 °F (36.4 °C)   TempSrc:    Oral   SpO2: 100%  99% 100%   Weight:  241 lb 10 oz (109.6 kg)     Height:          Wt Readings from Last 3 Encounters:   08/24/21 241 lb 10 oz (109.6 kg)   08/15/21 220 lb (99.8 kg)   06/29/21 240 lb (108.9 kg)       TELEMETRY: normal sinus                             Rhythm Strip: occ pvc    Physical Exam:  General Appearance: alert and oriented to person, place and time, in no acute distress, anxious  Cardiovascular: normal rate, regular rhythm, normal S1 and S2, no murmurs, rubs, clicks, or gallops,  no JVD  Pulmonary/Chest: clear to auscultation bilaterally- no wheezes, rales or rhonchi, normal air movement, no respiratory distress  Abdomen: soft, non-tender, non-distended, normal bowel sounds, no masses   Extremities: no cyanosis, clubbing or edema  Skin: warm and dry, no rash or erythema  Eyes: EOMI  Neck: supple and non-tender without mass, no thyromegaly   Neurological: alert, oriented, normal speech, no focal findings or movement disorder noted    Allergies:   Allergies   Allergen Reactions    Ambien [Zolpidem Tartrate]     Capoten [Captopril]     Clioquinol     Cogentin [Benztropine]     Depakote [Divalproex Sodium]     Effexor Xr [Venlafaxine Hcl Er]     Geodon [Ziprasidone Hcl]     Lisinopril      Hyperkalemia: 4/21/20 potassium was 6.7    Lyrica [Pregabalin]     Navane [Thiothixene]     Pamelor [Nortriptyline Hcl]  Remeron [Mirtazapine]     Risperdal [Risperidone]     Trazodone And Nefazodone     Wellbutrin [Bupropion]         Medications:    sodium chloride flush  5-40 mL Intravenous 2 times per day    enoxaparin  40 mg Subcutaneous Daily    levofloxacin  250 mg Intravenous Q24H    insulin glargine  50 Units Subcutaneous Nightly    insulin lispro  0-12 Units Subcutaneous TID WC    insulin lispro  0-6 Units Subcutaneous Nightly    amitriptyline  25 mg Oral Nightly    aspirin  81 mg Oral Daily    atorvastatin  80 mg Oral Nightly    folic acid  1 mg Oral Daily    furosemide  40 mg Oral Daily    gabapentin  600 mg Oral BID    haloperidol  5 mg Oral Daily    hydrOXYzine  25 mg Oral TID    levothyroxine  50 mcg Oral Daily    meclizine  25 mg Oral TID    montelukast  10 mg Oral Nightly    neomycin-polymyxin-dexameth  1 drop Both Eyes 4 times per day    prazosin  2 mg Oral Nightly    sertraline  200 mg Oral Daily    ticagrelor  90 mg Oral BID    ranolazine  1,000 mg Oral BID    metoprolol succinate  100 mg Oral Daily    isosorbide mononitrate  30 mg Oral Daily    hydrALAZINE  50 mg Oral 2 times per day      sodium chloride      dextrose       sodium chloride flush, 5-40 mL, PRN  sodium chloride, 25 mL, PRN  ondansetron, 4 mg, Q8H PRN   Or  ondansetron, 4 mg, Q6H PRN  acetaminophen, 650 mg, Q6H PRN   Or  acetaminophen, 650 mg, Q6H PRN  polyethylene glycol, 17 g, Daily PRN  glucose, 15 g, PRN  dextrose, 12.5 g, PRN  glucagon (rDNA), 1 mg, PRN  dextrose, 100 mL/hr, PRN        Diagnostics:      EK/23/21: no further RBBB.    2021: Normal sinus rhythm right bundle branch block. Compared to study of  right bundle branch block is a new finding. Echo: LVEF decreased, RVSP increased. Left ventricular ejection fraction is visually estimated at 35-40%. Severe anteroseptal hypokinesis   Papillary muscle hypertrophy   Diastolic dysfunction   Mildly enlarged right ventricle cavity. Right ventricle global systolic function is mildly reduced . Right ventricular systolic pressure of 62 mm Hg consistent with severe   pulmonary hypertension. Normal tricuspid valve structure and function. Moderate-to-severe tricuspid regurgitation. Normal pulmonic valve structure   2+ PI, No PS   Normal right atrium. Normal mitral valve structure   Mild MV leaflet thickening   2+ MR   Mildly dilated left atrium. CXR:           Portable: Results for orders placed during the hospital encounter of 08/21/21    XR CHEST PORTABLE    Narrative  EXAMINATION: CHEST PORTABLE VIEW    CLINICAL HISTORY: Midsternal chest pain    COMPARISONS: April 6, 2021 0143 hours    FINDINGS:    Single  views of the chest is submitted. There are multiple median sternotomy wires. The cardiac silhouette is enlarged  Pulmonary vascular unremarkable. Right sided trachea. Atelectasis, patchy infiltrate right lower lobe. No Pneumothoraces. Impression  ATELECTASIS, PATCHY INFILTRATE RIGHT LOWER LOBE.       Lab Data:    Cardiac Enzymes:  Recent Labs     08/22/21  0348 08/22/21  1114 08/24/21  0603   TROPONINI 0.079* 0.080* 0.045*     Component      Latest Ref Rng & Units 4/6/2021 4/6/2021 9/30/2020 9/1/2020           8:21 AM  2:00 AM  5:46 AM  8:07 PM   Troponin      0.000 - 0.010 ng/mL 0.335 (HH) 0.310 (HH) <0.010 <0.010     Component      Latest Ref Rng & Units 6/29/2020           6:36 AM   Troponin      0.000 - 0.010 ng/mL <0.010       ProBNP:   Lab Results   Component Value Date    PROBNP 3,242 08/21/2021       CBC:   Recent Labs     08/21/21  2130 08/23/21  0530 08/24/21  0603   WBC 10.0 7.2 8.6   RBC 4.51 4.14* 4.04*   HGB 11.7* 10.7* 10.5*   HCT 33.9* 31.8* 31.2*    175 163       CMP:    Recent Labs     08/22/21  0347 08/23/21  0530 08/24/21  0603   * 140 136   K 4.8 4.3 4.3   CL 99 104 99   CO2 23 27 26   BUN 42* 35* 31*   CREATININE 1.66* 1.56* 1.60*   GFRAA 37.7* 40.5* 39.3*   LABGLOM 31.1* 33.4* 32.5* GLUCOSE 185* 62* 127*   CALCIUM 8.9 8.4* 8.6       Hepatic Function Panel:    Recent Labs     08/22/21  0347 08/23/21  0530 08/24/21  0603   ALKPHOS 85 81 85   ALT 11 11 16   AST 16 16 20   PROT 6.4 6.6 6.8   BILITOT <0.2 0.4 0.5   LABALBU 3.3* 3.5 3.5       Magnesium:    Recent Labs     08/24/21  0603   MG 2.3       PT/INR:    Recent Labs     08/21/21  2130   PROTIME 14.5   INR 1.1       Lipids:    Recent Labs     08/22/21  0347 08/23/21  0530 08/24/21  0603   CHOL 119 101 102   TRIG 73 52 76   HDL 43 44 43   LDLCALC 61 47 44       Principal Problem:    Chest pain  Active Problems:    Diabetes mellitus (HCC)    Hypertension    Hypothyroid    CAD (coronary artery disease)    Elevated troponin    Pneumonia    CKD (chronic kidney disease) stage 3, GFR 30-59 ml/min (HCC)    Pain in right hand  Resolved Problems:    * No resolved hospital problems.  *           Electronically signed by Becky Pastor MD on 8/24/2021 at 8:43 AM

## 2021-08-24 NOTE — FLOWSHEET NOTE
Education given on stress test. Patient made aware test is tomorrow. Electronically signed by Gabriel Molina on 8/24/2021 at 6:50 PM

## 2021-08-25 NOTE — CARE COORDINATION
SPOKE WITH NEL AT [facility] AND THEY CAN TAKE PATIENT BUT START DATE WILL NOT BE UNTIL Tuesday. WILL TALK TO PATIENT ONCE BACK IN ROOM.

## 2021-08-25 NOTE — CARE COORDINATION
CALL PLACED TO [facility] AND CONFIRMED THAT THEY DID HAVE THIS PATIENT IN THE PAST.  WILL SEND REFERRAL ONCE ORDER OBTAINED

## 2021-08-25 NOTE — PROGRESS NOTES
CLINICAL PHARMACY NOTE: MEDS TO BEDS    Total # of Prescriptions Filled: 1   The following medications were delivered to the patient:  · Amoxic-Pot Cla 875-125 mg Tab  · (Ranolazine [facility])    Additional Documentation:

## 2021-08-25 NOTE — CARE COORDINATION
Spoke with patient via  tablet Sammy Vieyra #[id number]. Pt is ok with [facility] coming on Tuesday. MD aware as well. [facility] aware that dc is tonight vs tomorrow.

## 2021-08-25 NOTE — DISCHARGE SUMMARY
Physician Discharge Summary     Patient ID:  Yue Bell  [id number]  89 y.o.  1955    Admit date: 8/21/2021    Discharge date : 08/26/21     Admitting Physician: No admitting provider for patient encounter.      Discharge Physician: Norah Gibbons MD     Admission Diagnoses: Chest pain [R07.9]  Pneumonia of right lower lobe due to infectious organism [J18.9]  Chest pain, unspecified type [R07.9]  NSTEMI (non-ST elevated myocardial infarction) [facility]) [I21.4]    Discharge Diagnoses: RLL PNA, NSTEMI    Admission Condition: fair    Discharged Condition: good    Hospital Course:   1 - NSTEMI              Likely chronic changes to troponin as well as strain, cardiology following.              8/24 - plan for stress test in am.   8/25 - stress appears negative,await formal read prior to discharge     2 -  RLL PNA              Levaquin     3 - DM2               SSI, ac/hs checks     4 - CAD hx              Cont home meds, cardiology following     5 - HTN/hypothyroidism              Cont home meds    Consults: cardiology    Significant Diagnostic Studies: as below      Discharge Exam:  BP (!) 121/49   Pulse 61   Temp 98.1 °F (36.7 °C)   Resp 18   Ht 5' 4\" (1.626 m)   Wt 241 lb 10 oz (109.6 kg)   SpO2 96%   BMI 41.47 kg/m²   General appearance: alert, appears stated age and cooperative  Lungs: clear to auscultation bilaterally  Abdomen: soft, non-tender; bowel sounds normal; no masses,  no organomegaly  Extremities: extremities normal, atraumatic, no cyanosis or edema  Skin: Skin color, texture, turgor normal. No rashes or lesions    Labs:   Recent Labs     08/23/21  0530 08/24/21  0603 08/25/21  0630   WBC 7.2 8.6 8.4   HGB 10.7* 10.5* 10.6*   HCT 31.8* 31.2* 31.5*    163 158     Recent Labs     08/23/21  0530 08/23/21  0530 08/24/21  0603 08/25/21  0630     --  136 136   K 4.3   < > 4.3  4.3 4.0  4.0     --  99 100   CO2 27  --  26 28   BUN 35*  --  31* 30*   CREATININE 1.56*  -- 1.60* 1.54*   CALCIUM 8.4*  --  8.6 8.5    < > = values in this interval not displayed. Recent Labs     21  0530 21  0603 21  0630   AST 16 20 23   ALT 11 16 20   BILITOT 0.4 0.5 0.5   ALKPHOS 81 85 85     No results for input(s): INR in the last 72 hours. Recent Labs     21  0603 21  0630   TROPONINI 0.045* 0.050*       Urinalysis:   Lab Results   Component Value Date    NITRU Negative 2021    BLOODU Negative 2021    SPECGRAV 1.015 2021    GLUCOSEU Negative 2021       Radiology:   Most recent    Chest CT      WITH CONTRAST:No results found for this or any previous visit. WITHOUT CONTRAST: No results found for this or any previous visit. CXR      2-view: Results for orders placed during the hospital encounter of 20    XR CHEST STANDARD (2 VW)    Narrative  XR CHEST (2 VW) : 2020    CLINICAL HISTORY:  SOB .    COMPARISON: 2020. TECHNIQUE: Upright AP and lateral radiographs of the chest were obtained. FINDINGS:    A shallow inspiration is present without significant infiltrate identified. The heart remains mildly enlarged with postoperative changes from previous CABG. There is no significant pleural effusion, vascular congestion, pneumothorax, or displaced fractures identified. Impression  NO EVIDENCE OF ACTIVE CARDIOPULMONARY DISEASE. Results for orders placed during the hospital encounter of 20    XR CHEST STANDARD (2 VW)    Narrative  Patient MRN: [id number]    : 1955    Age:  60 years    Gender: Female    Order Date: 2020 7:00 AM.    Exam: XR CHEST (2 VW)    Number of Views: 2    Indication:  Shortness of breath and pulmonary hypertension.  71-year-old female presenting to emergency department with shortness of breath times several months, fall/syncope at home and evaluation of mental status changes    Comparison: 2020    Findings: Stable, enlarged cardiomediastinal silhouette with underlying pulmonary edema and nonspecific bibasilar airspace disease. Median sternotomy wires. Vascular calcifications thoracic aorta. No pneumothorax. Impression  Impression:  1. Stable, enlarged cardiac mediastinal silhouette with underlying pulmonary edema  10. . Nonspecific bibasilar airspace disease, findings can be seen in infiltrate/pneumonia and/or atelectasis. .       Portable: Results for orders placed during the hospital encounter of 08/21/21    XR CHEST PORTABLE    Narrative  EXAMINATION: CHEST PORTABLE VIEW    CLINICAL HISTORY: Midsternal chest pain    COMPARISONS: April 6, 2021 0143 hours    FINDINGS:    Single  views of the chest is submitted. There are multiple median sternotomy wires. The cardiac silhouette is enlarged  Pulmonary vascular unremarkable. Right sided trachea. Atelectasis, patchy infiltrate right lower lobe. No Pneumothoraces. Impression  ATELECTASIS, PATCHY INFILTRATE RIGHT LOWER LOBE. Echo No results found for this or any previous visit.       Disposition: home    In process/preliminary results:  Outstanding Order Results       Date and Time Order Name Status Description    8/25/2021  2:40 PM NM MYOCARDIAL SPECT REST EXERCISE OR RX In process             Patient Instructions:   Current Discharge Medication List        START taking these medications    Details   amoxicillin-clavulanate (AUGMENTIN) 875-125 MG per tablet Take 1 tablet by mouth 2 times daily for 7 days  Qty: 14 tablet, Refills: 0           CONTINUE these medications which have CHANGED    Details   ranolazine (RANEXA) 1000 MG extended release tablet Take 1 tablet by mouth 2 times daily  Qty: 60 tablet, Refills: 3           CONTINUE these medications which have NOT CHANGED    Details   insulin lispro, 1 Unit Dial, (HUMALOG KWIKPEN) 100 UNIT/ML SOPN 15  units at each meals  Qty: 10 pen, Refills: 03      albuterol sulfate HFA (VENTOLIN HFA) 108 (90 Base) MCG/ACT inhaler Inhale 2 puffs into the lungs as needed for dose, call 911. Qty: 25 tablet, Refills: 3      hydrALAZINE (APRESOLINE) 50 MG tablet Take 1 tablet by mouth every 8 hours  Qty: 90 tablet, Refills: 3      metoprolol succinate (TOPROL XL) 50 MG extended release tablet Take 1 tablet by mouth 2 times daily  Qty: 30 tablet, Refills: 3      haloperidol (HALDOL) 5 MG tablet Take 5 mg by mouth daily      meclizine (ANTIVERT) 25 MG tablet Take 25 mg by mouth three times daily      gabapentin (NEURONTIN) 600 MG tablet Take 600 mg by mouth 2 times daily. !! Continuous Blood Gluc Sensor (FREESTYLE FELY 14 DAY SENSOR) MISC Every 2 weeks  Qty: 2 each, Refills: 06      !! Insulin Pen Needle ([employer] PEN NEEDLES 31G) 31G X 8 MM MISC 1 each by Does not apply route 4 times daily  Qty: 300 each, Refills: 3      Alcohol Swabs (ALCOHOL PREP) 70 % PADS qid  Qty: 300 each, Refills: 06      !! Insulin Pen Needle (NOVOFINE) 32G X 6 MM MISC qid  Qty: 300 each, Refills: 3      oxybutynin (DITROPAN-XL) 5 MG extended release tablet take 1 tablet by mouth once daily  Qty: 90 tablet, Refills: 3      neomycin-polymyxin-dexameth (MAXITROL) 3.5-15930-1.1 ophthalmic suspension instill 1 drop into both eyes four times a day      !! Continuous Blood Gluc Sensor (FREESTYLE FELY 14 DAY SENSOR) MISC Every 2 weeks  Qty: 2 each, Refills: 06      Continuous Blood Gluc  (FREESTYLE FELY 14 DAY READER) CATHLEEN As directed  Qty: 1 Device, Refills: 00      CPAP Machine MISC by Does not apply route New CPAP with 10 cm  Qty: 1 each, Refills: 0    Associated Diagnoses: JESICA (obstructive sleep apnea)      Emollient (EUCERIN INTENSIVE REPAIR HAND) 2.5-10 % CREA APPLY TO FEET AT BEDTIME; PLACE SOCKS OVER FEET AFTER APPLICATION IF NEEDED FOR DRY CRACKING FEET      Nutritional Supplements (GLUCERNA SHAKE) LIQD take as directed three times a day      amitriptyline (ELAVIL) 25 MG tablet Take 25 mg by mouth nightly      gabapentin (NEURONTIN) 400 MG capsule Take 400 mg by mouth 2 times daily.  AM and 800 mg in pm-9pm      FreeStyle Lancets MISC 1 each by Does not apply route daily  Qty: 100 each, Refills: 3      blood glucose test strips (FREESTYLE LITE) strip 1 each by In Vitro route daily As needed. Qty: 100 each, Refills: 3      folic acid (FOLVITE) 1 MG tablet Take 1 mg by mouth daily      Iron Polysacch Cbyvm-F11-SN (NIFEREX-150 FORTE PO) Take 1 tablet by mouth daily       Cyanocobalamin (VITAMIN B-12) 1000 MCG extended release tablet Take 1,000 mcg by mouth daily       ! ! - Potential duplicate medications found. Please discuss with provider. STOP taking these medications       levoFLOXacin (LEVAQUIN) 750 MG tablet Comments:   Reason for Stopping:         insulin glargine (LANTUS) 100 UNIT/ML injection vial Comments:   Reason for Stopping:             Activity: activity as tolerated  Diet: regular diet  Wound Care: none needed    Follow-up with PCP 1-2 weeks.     DC time 35 minutes    Signed:  Electronically signed by Candice Vela MD on 8/25/2021 at 3:21 PM

## 2021-08-25 NOTE — PROGRESS NOTES
[facility] Progress Note      Date: 8/25/2021      Patient:  Red Scott       YOB: 1955  MRN: [id number]   Acct: [de-identified]    Primary Cardiologist:Dr. Immanuel Canada    Reason for Consult: Chest pain    Rounding Cardiologist: Becky Pastor MD     Subjective:     No significant changes overnight. Patient states that she is better overall. She still has some shortness of breath. She still has some reproducible chest discomfort which is chronic. She is being treated for pneumonia. ECG is changed. Echo with EF now of 35-40%    Troponins elevated. Lexiscan stress test today, Cath later if positive. 14 system General and Cardiac ROS otherwise negative or unchanged. Impression:     1. Shortness of breath  2. Chest discomfort, chronic, reproducible, musculoskeletal  3. Elevated troponins, low flat pattern, doubt acute true myocardial infarction. 4. Pneumonia, right lower lobe  5. Coronary artery disease, post prior CABG, prior left main left circumflex stent, known 100% right coronary occlusion chronic, last cath 4/21 with patent stent and bypass, medical treatment. 6. Ischemic Cardiomyopathy, with anterior hypokinesis, LVEF 35-40%. 7. Abnormal electrocardiogram with first-degree AV block and anterior MI.  8. Right bundle branch block, new, alternating, now resolved. 9. Hypertension  10. Hyperlipidemia  11. Renal insufficiency, chronic  12. Anemia  13. Pulmonary hypertension  14. Peripheral vascular disease, history of right toe amputation. 15. Schizoaffective disorder  16. Family history of CAD    Plan:     1. Cardiovascular supportive care  2. Primary care for management of pneumonia and other noncardiac issues. 3. Telemetry monitoring. 4. Serial ECGs and troponins. 5. Maximize medications as tolerated. 6. Perfusion stress testing today, possible cardiac catheterization as warranted. 7. Hydration  8. Further recommendations to follow.   9. See orders. -------------------------------------------------------------------------------------------------  Dr Janna Connolly Impression/Plan:     1. Chest discomfort/elevated enzymes: In April cardiac enzymes were considerably more elevated but there have been no troponins between then and now. I cannot completely exclude a new episode of ischemia. Also of concern is new onset right bundle branch block. There are no acute ischemic changes associated with the bundle branch block at this time. Her chest tightness is now resolved. It is very unlikely the internal mammary artery has failed in the last 5 months. Coronary angiography at that time demonstrated widely patent left main/circumflex stent and left internal mammary artery bypass. The right coronary is chronically occluded and one would anticipate intermittent chest discomfort. She is on 2 vasodilators Minipress and hydralazine. Occasionally, excess arterial vasodilatation can contribute to coronary artery steal and angina. Perhaps further at adjusting medications would be to her interest.  I do not think significant abnormalities are likely to be found at repeat catheterization at least at this point in time. We will continue to follow serial ECG and enzymes if there is a significant peaking of troponin or marked change in ECG may have to again consider coronary angiography. Will increase ranolazine. Obtain echo to assure no new wall motion abnormalities. Simplify antihypertensive regimen and hopefully avoid Minipress and hydralazine. First reduce hydralazine  2. Coronary artery disease: Coronary angiogram this past April showed patent stents, LIMA and chronically occluded right. There is significant small vessel disease and the septal perforators between the proximal LAD stenosis and complete LAD stenosis continue to be poorly perfused which may contribute to some of her discomfort.   Because of marked calcification angulation the proximal LAD could not be approached by angioplasty in April. May need to reconsider depending upon symptoms and clinical course continue dual antiplatelet therapy  3. Hypertension: She cannot tolerate ACE inhibitor secondary to a cough. I cannot see evidence of prior trials with AR-2 blocker but with current renal insufficiency and potassium upper normal would avoid at least at this point in time hence despite comments above may need to continue hydralazine. 4. Shortness of breath: Obtain echocardiogram as described he has had history of pulmonary hypertension which perhaps has worsened. She has no lower extremity edema or significant elevation of neck megalies at this point time. Also being evaluated for pneumonia    Chief Complaint/Active Symptoms: 47-year-old woman with complex cardiovascular and peripheral vascular disease. History is obtained via  service [facility]. She says she was initially doing fair after the cardiac catheterization in April but still has not been feeling well. More recently she has noted exertional dyspnea when she exerts herself a feeling of pressure in her head and perhaps her jaw. Sometimes she says her mouth feels twisted when she walks. She presented to hospital however because she had very severe chest discomfort midsternal without particular radiation. Not associate with diaphoresis but it did make her short of breath and anxious. She is not had dizziness or syncope. She leads a fairly sedentary lifestyle. She notes she is much more fatigued when she exerts herself and thinks it may be related somewhat to her current medical regimen as she felt worse after the more recent change in medicines made in the cardiology office this past July. Review of Systems:   12 point review of systems negative. No bleeding issues she does have abdominal discomfort but no nausea or vomiting.     CARDIAC HISTORY:  CAD: CABG 6/2019: L-LAD, PCI left circumflex and RCA.            4/6/2021 cardiac cath/NSTEMI: LVEF 50% apical hypokinesis. LVEDP 15. LM-10% patent stent. LAD-95% ostial 100% after SP2. Attempted PTCA ostial LAD unsuccessful. CX-stents patent. RCA: Occluded with retrograde collateral.  L-LAD patent. Pulmonary hypertension: Echo September 2020 LVEF 60%. 1+ MR.  RVSP 44 mmHg  Severe peripheral vascular disease with prior right third toe amputation and right lower extremity revascularization multiple attempts    Past Medical History:   Diagnosis Date    Asthma     CAD (coronary artery disease)     CKD (chronic kidney disease) stage 3, GFR 30-59 ml/min ([facility])     Colitis     Diabetes mellitus ([facility] [address]     Hyperlipidemia     Hypertension     PAD (peripheral artery disease) ([facility])     Prolonged emergence from general anesthesia     PVD (peripheral vascular disease) ([facility] [address]     Thyroid disease    Schizoaffective disorder    Family History   1. Family history of diabetes mellitus (V18.0) (Z83.3) : Mother, Sister   2. Family history of Hodgkin's lymphoma (V16.7) (Z80.7) : Father   3. Family history of hypertension (V17.49) (Z82.49) : Mother, Father, Sister, Brother   4. Family history of myocardial infarction (V17.3) (Z82.49) : Father   5. Family history of Lupus : Sister    Social History   · Daily caffeine consumption, 1 serving a day   · Never a smoker   · No alcohol use   · No illicit drug use    Surgical History   1. History of Toe amputation   2. History of Tooth extraction    Current Meds   1. Albuterol Sulfate  MCG/ACT AERS; INHALE 1 PUFF EVERY 4 HOURS AS   NEEDED; Therapy: (Prudencio Barajas) to Recorded   2. Ammonium Lactate 12 % External Lotion; APPLY  AND RUB  IN A THIN FILM TO   AFFECTED AREAS TWICE DAILY. (AM AND PM); Therapy: 12Jan2021 to Recorded   3. Artificial Tears 1.4 % Ophthalmic Solution; INSTILL 1-2 DROPS INTO AFFECTED EYE(S)    4 TIMES DAILY AS DIRECTED; Therapy: [id number] to Recorded   4.  Aspirin 81 MG Oral Tablet Delayed Release; TAKE 1 TABLET DAILY Requested for:   24Feb2021; Last Rx:24Feb2021 Ordered   5. Atorvastatin Calcium 80 MG Oral Tablet; TAKE 1 TABLET AT BEDTIME; Therapy: 34Uts5325 to (Evaluate:03Vla1401)  Requested for: 24Feb2021; Last   Rx:24Feb2021 Ordered   6. Brilinta 90 MG Oral Tablet; TAKE 1 TABLET TWICE DAILY; Therapy: 37Syd8033 to (Evaluate:51Zyr6726)  Requested for: 24Feb2021; Last   Rx:24Feb2021 Ordered   7. Buprenorphine 7.5 MCG/HR Transdermal Patch Weekly; Therapy: 16XPO5736 to Recorded   8. Diclofenac Sodium 1 % External Gel; APPLY SPARINGLY TO AFFECTED AREA(S) ONCE   DAILY; Therapy: 19XND3726 to Recorded   9.  MG Oral Capsule; take 1 cap daily; Therapy: 51EZW2935 to Recorded   10. Doxepin HCl - 10 MG Oral Capsule; TAKE 1 TO 2 CAPSULES AT BEDTIME AS NEEDED    FOR ITCHING; Therapy: 20ZGR3076 to Recorded   11. Furosemide 40 MG Oral Tablet; Take one tab daily; Therapy: 84Aqm9782 to (Evaluate:57Vsy8570)  Requested for: 24Feb2021; Last    Rx:24Feb2021 Ordered   12. Gabapentin 400 MG Oral Capsule; 1 Cap in the morning 2 at Night; Therapy: (Recorded:92Hce4939) to Recorded   13. Haloperidol 5 MG Oral Tablet; TAKE 1 TABLET TWICE DAILY; Therapy: (Recorded:73Wwz1353) to Recorded   14. hydrALAZINE HCl - 50 MG Oral Tablet; TAKE 1 TABLET 3 TIMES DAILY; Therapy: 34Dcn5196 to (Evaluate:34Bkq5166)  Requested for: 24Feb2021; Last    Rx:24Feb2021 Ordered   15. hydrOXYzine HCl - 25 MG Oral Tablet; TAKE 1 TABLET 3 TIMES DAILY AS NEEDED; Therapy: (Nadia Blush) to Recorded   16. Isosorbide Dinitrate 20 MG Oral Tablet; TAKE 1 TABLET BY MOUTH EVERY 8 HOURS; Therapy: 32Uvx8527 to (Evaluate:55Bja0029)  Requested for: 24Feb2021; Last    Rx:25Viv0630 Ordered   17. Lantus 100 UNIT/ML Subcutaneous Solution; INJECT SUBCUTANEOUSLY AS    DIRECTED; Therapy: (Nadia Blush) to Recorded   18. Levothyroxine Sodium 50 MCG Oral Tablet; 1 TABLET DAILY; Therapy: 92Eus5954 to Recorded   19.  Meclizine HCl - 25 MG Oral Tablet; TAKE 1 TABLET 3 TIMES DAILY AS NEEDED; Therapy: (Frisusanie Fuchs) to Recorded   20. Metoprolol Succinate ER 50 MG Oral Tablet Extended Release 24 Hour; TAKE 1 TABLET    TWICE A DAY  Requested for: 62PYV5235; Last Rx:08Geg5452 Ordered   21. Montelukast Sodium 10 MG Oral Tablet; TAKE 1 TABLET AT BEDTIME; Therapy: (Friddie Fuchs) to Recorded   22. Nitroglycerin 0.4 MG Sublingual Tablet Sublingual; PLACE 1 TABLET UNDER THE    TONGUE EVERY 5 MINUTES FOR UP TO 3 DOSES AS NEEDED FOR CHEST    PAIN. CALL 911 IF PAIN PERSISTS  Requested for: 03QHV0333; Last Rx:19Xxj2099    Ordered   23. Prazosin HCl - 2 MG Oral Capsule; TAKE 1 CAPSULE EVERY 12 HOURS DAILY; Therapy: (Friddie Fuchs) to Recorded   24. Ranolazine  MG Oral Tablet Extended Release 12 Hour; TAKE 1 TABLET EVERY    12 HOURS; Therapy: 11LTH6422 to Recorded   25. Restasis 0.05 % Ophthalmic Emulsion; INSTILL 1 DROP IN [facility] EYE TWICE DAILY; Therapy: 28OCU7958 to Recorded   26. Sertraline HCl - 100 MG Oral Tablet; TAKE 2 TABLETS DAILY; Therapy: (Friddie Fuchs) to Recorded   27. Trulicity 1.65 IT/6.5PC Subcutaneous Solution Pen-injector; Inject 0.5Ml subcu nightly; Therapy: 46WOC9665 to Recorded    Reviewed meds with PT list.DS MA     Allergies   1. Ambien TABS   nervousness; Insomnia; Recorded By: April Rodriguez; 04/17/2019 3:02:20 PM   2. Capoten TABS   Cough; Palpitations; Recorded By: April Rodriguez; 04/17/2019 3:02:20 PM   3. Cogentin   Palpitations; Recorded By: April Rodriguez; 04/17/2019 3:02:20 PM   4. Geodon CAPS   vision problems; Sleeplessness; Recorded By: April Rodriguez; 04/17/2019 3:02:20 PM   5. KlonoPIN TABS   Palpitations; Recorded By: April Rodriguez; 04/17/2019 3:02:20 PM   6. Navane CAPS   Headache; Recorded By: April Rodriguez; 04/17/2019 3:02:20 PM   7. Remeron   Nausea; Vomiting; Recorded By: April Rodriguez; 04/17/2019 3:02:20 PM   8. RisperDAL TABS   Nausea; Recorded By: April Rodriguez; 04/17/2019 3:02:20 PM   9. SEROquel TABS   Palpitations; Recorded By: Belinda Jarrett; 04/17/2019 3:02:20 PM   10. Abilify   Recorded By: Suad Nix; 11/08/2019 11:02:43 AM   11. Depakote ER TB24   unsteady, falls; Recorded By: Belinda Jarrett; 04/17/2019 3:02:20 PM   12. Effexor TABS   patient states she felt high on medication; Recorded By: Belinda Jarrett; 04/17/2019 3:02:20 PM          Objective:   Vitals:    08/24/21 1904 08/24/21 2047 08/25/21 0728 08/25/21 0800   BP: 106/70  (!) 121/49    Pulse: 72 75 61 61   Resp: 18  18    Temp: 99.1 °F (37.3 °C)  98.1 °F (36.7 °C)    TempSrc: Oral      SpO2: 100%  96%    Weight:       Height:          Wt Readings from Last 3 Encounters:   08/24/21 241 lb 10 oz (109.6 kg)   08/15/21 220 lb (99.8 kg)   06/29/21 240 lb (108.9 kg)       TELEMETRY: normal sinus                             Rhythm Strip: occ pvc    Physical Exam:  General Appearance: alert and oriented to person, place and time, in no acute distress, anxious  Cardiovascular: normal rate, regular rhythm, normal S1 and S2, no murmurs, rubs, clicks, or gallops,  no JVD  Pulmonary/Chest: clear to auscultation bilaterally- no wheezes, rales or rhonchi, normal air movement, no respiratory distress  Abdomen: soft, non-tender, non-distended, normal bowel sounds, no masses   Extremities: no cyanosis, clubbing or edema  Skin: warm and dry, no rash or erythema  Eyes: EOMI  Neck: supple and non-tender without mass, no thyromegaly   Neurological: alert, oriented, normal speech, no focal findings or movement disorder noted    Allergies:   Allergies   Allergen Reactions    Ambien [Zolpidem Tartrate]     Capoten [Captopril]     Clioquinol     Cogentin [Benztropine]     Depakote [Divalproex Sodium]     Effexor Xr [Venlafaxine Hcl Er]     Geodon [Ziprasidone Hcl]     Lisinopril      Hyperkalemia: 4/21/20 potassium was 6.7    Lyrica [Pregabalin]     Navane [Thiothixene]     Pamelor [Nortriptyline Hcl]     Remeron [Mirtazapine]     Risperdal [Risperidone]     Trazodone And Nefazodone     Wellbutrin [Bupropion]         Medications:    sodium chloride flush  5-40 mL IntraVENous 2 times per day    enoxaparin  40 mg Subcutaneous Daily    levofloxacin  250 mg IntraVENous Q24H    insulin glargine  50 Units Subcutaneous Nightly    insulin lispro  0-12 Units Subcutaneous TID WC    insulin lispro  0-6 Units Subcutaneous Nightly    amitriptyline  25 mg Oral Nightly    aspirin  81 mg Oral Daily    atorvastatin  80 mg Oral Nightly    folic acid  1 mg Oral Daily    furosemide  40 mg Oral Daily    gabapentin  600 mg Oral BID    haloperidol  5 mg Oral Daily    hydrOXYzine  25 mg Oral TID    levothyroxine  50 mcg Oral Daily    meclizine  25 mg Oral TID    montelukast  10 mg Oral Nightly    neomycin-polymyxin-dexameth  1 drop Both Eyes 4 times per day    prazosin  2 mg Oral Nightly    sertraline  200 mg Oral Daily    ticagrelor  90 mg Oral BID    ranolazine  1,000 mg Oral BID    metoprolol succinate  100 mg Oral Daily    isosorbide mononitrate  30 mg Oral Daily    hydrALAZINE  50 mg Oral 2 times per day      sodium chloride      dextrose       sodium chloride flush, 5-40 mL, PRN  sodium chloride, 25 mL, PRN  ondansetron, 4 mg, Q8H PRN   Or  ondansetron, 4 mg, Q6H PRN  acetaminophen, 650 mg, Q6H PRN   Or  acetaminophen, 650 mg, Q6H PRN  polyethylene glycol, 17 g, Daily PRN  glucose, 15 g, PRN  dextrose, 12.5 g, PRN  glucagon (rDNA), 1 mg, PRN  dextrose, 100 mL/hr, PRN        Diagnostics:      EK/23/21: no further RBBB.    2021: Normal sinus rhythm right bundle branch block. Compared to study of  right bundle branch block is a new finding. Echo: LVEF decreased, RVSP increased. Left ventricular ejection fraction is visually estimated at 35-40%. Severe anteroseptal hypokinesis   Papillary muscle hypertrophy   Diastolic dysfunction   Mildly enlarged right ventricle cavity.    Right ventricle global systolic function is mildly reduced . Right ventricular systolic pressure of 62 mm Hg consistent with severe   pulmonary hypertension. Normal tricuspid valve structure and function. Moderate-to-severe tricuspid regurgitation. Normal pulmonic valve structure   2+ PI, No PS   Normal right atrium. Normal mitral valve structure   Mild MV leaflet thickening   2+ MR   Mildly dilated left atrium. CXR:           Portable: Results for orders placed during the hospital encounter of 08/21/21    XR CHEST PORTABLE    Narrative  EXAMINATION: CHEST PORTABLE VIEW    CLINICAL HISTORY: Midsternal chest pain    COMPARISONS: April 6, 2021 0143 hours    FINDINGS:    Single  views of the chest is submitted. There are multiple median sternotomy wires. The cardiac silhouette is enlarged  Pulmonary vascular unremarkable. Right sided trachea. Atelectasis, patchy infiltrate right lower lobe. No Pneumothoraces. Impression  ATELECTASIS, PATCHY INFILTRATE RIGHT LOWER LOBE.       Lab Data:    Cardiac Enzymes:  Recent Labs     08/22/21  1114 08/24/21  0603 08/25/21  0630   TROPONINI 0.080* 0.045* 0.050*     Component      Latest Ref Rng & Units 4/6/2021 4/6/2021 9/30/2020 9/1/2020           8:21 AM  2:00 AM  5:46 AM  8:07 PM   Troponin      0.000 - 0.010 ng/mL 0.335 (HH) 0.310 (HH) <0.010 <0.010     Component      Latest Ref Rng & Units 6/29/2020           6:36 AM   Troponin      0.000 - 0.010 ng/mL <0.010       ProBNP:   Lab Results   Component Value Date    PROBNP 3,242 08/21/2021       CBC:   Recent Labs     08/23/21  0530 08/24/21  0603 08/25/21  0630   WBC 7.2 8.6 8.4   RBC 4.14* 4.04* 4.06*   HGB 10.7* 10.5* 10.6*   HCT 31.8* 31.2* 31.5*    163 158       CMP:    Recent Labs     08/23/21  0530 08/23/21  0530 08/24/21  0603 08/25/21  0630     --  136 136   K 4.3   < > 4.3  4.3 4.0  4.0     --  99 100   CO2 27  --  26 28   BUN 35*  --  31* 30*   CREATININE 1.56*  --  1.60* 1.54*   GFRAA 40.5*  --  39.3* 41.1*   LABGLOM 33.4* --  32.5* 33.9*   GLUCOSE 62*  --  127* 101*   CALCIUM 8.4*  --  8.6 8.5    < > = values in this interval not displayed. Hepatic Function Panel:    Recent Labs     08/23/21  0530 08/24/21  0603 08/25/21  0630   ALKPHOS 81 85 85   ALT 11 16 20   AST 16 20 23   PROT 6.6 6.8 6.5   BILITOT 0.4 0.5 0.5   LABALBU 3.5 3.5 3.5       Magnesium:    Recent Labs     08/24/21  0603   MG 2.3       PT/INR:    No results for input(s): PROTIME, INR in the last 72 hours. Lipids:    Recent Labs     08/23/21  0530 08/24/21 0603 08/25/21  0630   CHOL 101 102 86   TRIG 52 76 50   HDL 44 43 42   LDLCALC 47 44 34       Principal Problem:    Chest pain  Active Problems:    Diabetes mellitus (HCC)    Hypertension    Hypothyroid    CAD (coronary artery disease)    NSTEMI (non-ST elevated myocardial infarction) (HCC)    Elevated troponin    Pneumonia    CKD (chronic kidney disease) stage 3, GFR 30-59 ml/min (HCC)    Pain in right hand  Resolved Problems:    * No resolved hospital problems.  *           Electronically signed by Nury Maki MD on 8/25/2021 at 10:46 AM

## 2021-08-25 NOTE — PROGRESS NOTES
Hospitalist Daily Progress Note  Name: Som Calix  Age: 63 y.o. Gender: female  CodeStatus: Full Code  Allergies: Ambien [Zolpidem Tartrate]  Capoten [Captopril]  Clioquinol  Cogentin [Benztropine]  Depakote [Divalproex Sodium]  Effexor Xr [Venlafaxine Hcl Er]  Geodon [Ziprasidone Hcl]  Lisinopril  Lyrica [Pregabalin]  Navane [Thiothixene]  Pamelor [Nortriptyline Hcl]  Remeron [Mirtazapine]  Risperdal [Risperidone]  Trazodone And Nefazodone  Wellbutrin [Bupropion]    Chief Complaint:Chest Pain (chest pain, shortness of breath, and dizzy x 1 week, worse today)      Primary Care Provider: Mohan Metzger    InpatientTreatment Team: Treatment Team: Attending Provider: Ailyn Herbert MD; Consulting Physician: Bernard Blandon MD; Utilization Reviewer: Michelle Urbina RN; : ALMAZ Tejeda; : Corinne Krauss, RN; Registered Nurse: Paddy Tom RN; Patient Care Tech: [employer] [facility]    Admission Date: 8/21/2021      Subjective: No nausea. Pain improved, no sob today. Physical Exam  Vitals and nursing note reviewed. Constitutional:       Appearance: Normal appearance. Cardiovascular:      Rate and Rhythm: Normal rate and regular rhythm. Pulmonary:      Effort: Pulmonary effort is normal.      Breath sounds: Normal breath sounds. Abdominal:      General: Bowel sounds are normal.      Palpations: Abdomen is soft. Musculoskeletal:         General: Normal range of motion. Skin:     General: Skin is warm and dry. Neurological:      Mental Status: She is alert and oriented to person, place, and time. Mental status is at baseline.          Medications:  Reviewed    Infusion Medications:    sodium chloride      dextrose       Scheduled Medications:    sodium chloride flush  5-40 mL IntraVENous 2 times per day    enoxaparin  40 mg Subcutaneous Daily    levofloxacin  250 mg IntraVENous Q24H    insulin glargine  50 Units Subcutaneous Nightly    insulin lispro any previous visit. WITHOUT CONTRAST: No results found for this or any previous visit. CXR      2-view: Results for orders placed during the hospital encounter of 20    XR CHEST STANDARD (2 VW)    Narrative  XR CHEST (2 VW) : 2020    CLINICAL HISTORY:  SOB .    COMPARISON: 2020. TECHNIQUE: Upright AP and lateral radiographs of the chest were obtained. FINDINGS:    A shallow inspiration is present without significant infiltrate identified. The heart remains mildly enlarged with postoperative changes from previous CABG. There is no significant pleural effusion, vascular congestion, pneumothorax, or displaced fractures identified. Impression  NO EVIDENCE OF ACTIVE CARDIOPULMONARY DISEASE. Results for orders placed during the hospital encounter of 20    XR CHEST STANDARD (2 VW)    Narrative  Patient MRN: [id number]    : 1955    Age:  60 years    Gender: Female    Order Date: 2020 7:00 AM.    Exam: XR CHEST (2 VW)    Number of Views: 2    Indication:  Shortness of breath and pulmonary hypertension. 60-year-old female presenting to emergency department with shortness of breath times several months, fall/syncope at home and evaluation of mental status changes    Comparison: 2020    Findings: Stable, enlarged cardiomediastinal silhouette with underlying pulmonary edema and nonspecific bibasilar airspace disease. Median sternotomy wires. Vascular calcifications thoracic aorta. No pneumothorax. Impression  Impression:  1. Stable, enlarged cardiac mediastinal silhouette with underlying pulmonary edema  10. . Nonspecific bibasilar airspace disease, findings can be seen in infiltrate/pneumonia and/or atelectasis. .       Portable: Results for orders placed during the hospital encounter of 21    XR CHEST PORTABLE    Narrative  EXAMINATION: CHEST PORTABLE VIEW    CLINICAL HISTORY: Midsternal chest pain    COMPARISONS: 2021 Shelton Henning 1520 hours    FINDINGS:    Single  views of the chest is submitted. There are multiple median sternotomy wires. The cardiac silhouette is enlarged  Pulmonary vascular unremarkable. Right sided trachea. Atelectasis, patchy infiltrate right lower lobe. No Pneumothoraces. Impression  ATELECTASIS, PATCHY INFILTRATE RIGHT LOWER LOBE. Echo No results found for this or any previous visit.             Assessment/Plan:    Active Hospital Problems    Diagnosis Date Noted    Elevated troponin [R77.8] 08/22/2021    Pneumonia [J18.9] 08/22/2021    CKD (chronic kidney disease) stage 3, GFR 30-59 ml/min ([facility]) [N18.30]     Pain in right hand [M79.641]     NSTEMI (non-ST elevated myocardial infarction) ([facility] [address] [I21.4] 04/06/2021    Chest pain [R07.9] 02/11/2020    CAD (coronary artery disease) [I25.10] 06/28/2019    Diabetes mellitus ([facility] [address] [E11.9] 02/25/2019    Hypertension [I10] 02/25/2019    Hypothyroid [E03.9] 02/25/2019     1 - NSTEMI   Likely chronic changes to troponin as well as strain, cardiology following.   8/24 - plan for stress test in am.    2 -  RLL PNA   Levaquin    3 - DM2    SSI, ac/hs checks    4 - CAD hx   Cont home meds, cardiology following    5 - HTN/hypothyroidism   Cont home meds    6 - DVT proph    Electronically signed by Jacquelyn Reddy MD on 8/25/2021 at 2:26 AM

## 2021-08-25 NOTE — PROGRESS NOTES
Reviewed history, allergies, and medications. Pt received imdur, metoprolol, and ranexa this AM per MAR. Consent confirmed. Lexiscan exam explained. Ananya trotter used  to explain procedure. Placed patient on monitor. [facility] here to inject Lexiscan and remainder of isotope. SOB noted during recovery phase. Denied chest pain. Rare PVC's noted. Patient off monitor and instructed to eat, will have last part of exam in 1 hour.   Electronically signed by Bryan Mcfadden RN on 8/25/2021 at 1:37 PM

## 2021-08-25 NOTE — PROCEDURES
Anastacia Tong [place] [address] [place] [address], [id number] [facility]                              CARDIAC STRESS TEST    PATIENT NAME: Lazara Coleman              :        1955  MED REC NO:   [id number]                            ROOM:       W168  ACCOUNT NO:   [de-identified]                           ADMIT DATE: 2021  PROVIDER:     Nita Elkins MD    CARDIOVASCULAR DIAGNOSTIC DEPARTMENT    DATE OF STUDY:  2021    LEXISCAN MYOCARDIAL PERFUSION IMAGING STUDY    INDICATION:  Chest pain. RESTING ECG:  Normal sinus rhythm, evidence of prior anterior wall  myocardial infarction, diffuse nonspecific ST-T abnormalities. LEXISCAN:  Lexiscan infused to a total dose of 0.4 mg in a rapid bolus  fashion followed by 31.1 mCi of Myoview. Resting imaging performed  earlier in the day after injection 11.9 mCi of Myoview. The patient  tolerated the procedure without adverse effect. Chronotropic and blood  pressure responses were normal.  ECG monitoring revealed no significant  ST-T abnormalities or dysrhythmias. MYOVIEW STUDY:  Myoview injected as described above. LV FUNCTION:  Overall, ejection fraction calculated at 56%. There is no  clear-cut wall motion abnormality, but there is motion attenuation  artifact that reduces sensitivity and specificity of the study. TID is  calculated at 1.09, visually it would appear to be upper normal.    PERFUSION DATA:  Perfusion suggests minimal anteroseptal ischemia. However, there is marked breast attenuation artifact, which is clearly  worse post-stress, markedly decreasing the sensitivity and specificity  of this finding. FINAL IMPRESSION:  1. Normal LV systolic function at 47% without obvious segmental wall  motion. 2.  Normal TID calculation at 1.09, though visually upper normal.  3.  Borderline anterior septal ischemia.   However, the patient is known  to have patent left internal mammary artery just five months ago on  angiography. With this information and the patient's clinical course it  is more likely this defect is related to soft tissue breast attenuation  artifact. Clinical correlation is advised.         Dereck Roa MD    D: 08/25/2021 #17:18:53       T: 08/25/2021 17:22:33     [email or web address]  Job#: [id number]     Doc#: [id number]    CC:

## 2021-08-26 NOTE — PROGRESS NOTES
CAD,  cardiac catheterization not warranted at this time. 5. Encourage PO intake. 6. Hydration. 7. OK to DC home from CV point once ok with others. 8. Follow up in [facility] office in 2 weeks with Dr Va Baron. 9. See orders. -------------------------------------------------------------------------------------------------  Dr Jocleine Mitchell Prior Impression/Plan:     1. Chest discomfort/elevated enzymes: In April cardiac enzymes were considerably more elevated but there have been no troponins between then and now. I cannot completely exclude a new episode of ischemia. Also of concern is new onset right bundle branch block. There are no acute ischemic changes associated with the bundle branch block at this time. Her chest tightness is now resolved. It is very unlikely the internal mammary artery has failed in the last 5 months. Coronary angiography at that time demonstrated widely patent left main/circumflex stent and left internal mammary artery bypass. The right coronary is chronically occluded and one would anticipate intermittent chest discomfort. She is on 2 vasodilators Minipress and hydralazine. Occasionally, excess arterial vasodilatation can contribute to coronary artery steal and angina. Perhaps further at adjusting medications would be to her interest.  I do not think significant abnormalities are likely to be found at repeat catheterization at least at this point in time. We will continue to follow serial ECG and enzymes if there is a significant peaking of troponin or marked change in ECG may have to again consider coronary angiography. Will increase ranolazine. Obtain echo to assure no new wall motion abnormalities. Simplify antihypertensive regimen and hopefully avoid Minipress and hydralazine. First reduce hydralazine  2. Coronary artery disease: Coronary angiogram this past April showed patent stents, LIMA and chronically occluded right.   There is significant small vessel disease and the 12 point review of systems negative. No bleeding issues she does have abdominal discomfort but no nausea or vomiting. CARDIAC HISTORY:  CAD: CABG 6/2019: L-LAD, PCI left circumflex and RCA.            4/6/2021 cardiac cath/NSTEMI: LVEF 50% apical hypokinesis. LVEDP 15. LM-10% patent stent. LAD-95% ostial 100% after SP2. Attempted PTCA ostial LAD unsuccessful. CX-stents patent. RCA: Occluded with retrograde collateral.  L-LAD patent. Pulmonary hypertension: Echo September 2020 LVEF 60%. 1+ MR.  RVSP 44 mmHg  Severe peripheral vascular disease with prior right third toe amputation and right lower extremity revascularization multiple attempts    Past Medical History:   Diagnosis Date    Asthma     CAD (coronary artery disease)     CKD (chronic kidney disease) stage 3, GFR 30-59 ml/min ([facility])     Colitis     Diabetes mellitus ([facility] [address]     Hyperlipidemia     Hypertension     PAD (peripheral artery disease) ([facility])     Prolonged emergence from general anesthesia     PVD (peripheral vascular disease) ([facility] [address]     Thyroid disease    Schizoaffective disorder    Family History   1. Family history of diabetes mellitus (V18.0) (Z83.3) : Mother, Sister   2. Family history of Hodgkin's lymphoma (V16.7) (Z80.7) : Father   3. Family history of hypertension (V17.49) (Z82.49) : Mother, Father, Sister, Brother   4. Family history of myocardial infarction (V17.3) (Z82.49) : Father   5. Family history of Lupus : Sister    Social History   · Daily caffeine consumption, 1 serving a day   · Never a smoker   · No alcohol use   · No illicit drug use    Surgical History   1. History of Toe amputation   2. History of Tooth extraction    Current Meds   1. Albuterol Sulfate  MCG/ACT AERS; INHALE 1 PUFF EVERY 4 HOURS AS   NEEDED; Therapy: (Suzie Quant) to Recorded   2. Ammonium Lactate 12 % External Lotion; APPLY  AND RUB  IN A THIN FILM TO   AFFECTED AREAS TWICE DAILY. (AM AND PM);    Therapy: 12Jan2021 to Recorded   3. Artificial Tears 1.4 % Ophthalmic Solution; INSTILL 1-2 DROPS INTO AFFECTED EYE(S)    4 TIMES DAILY AS DIRECTED; Therapy: 01GJE0707 to Recorded   4. Aspirin 81 MG Oral Tablet Delayed Release; TAKE 1 TABLET DAILY  Requested for:   24Feb2021; Last Rx:24Feb2021 Ordered   5. Atorvastatin Calcium 80 MG Oral Tablet; TAKE 1 TABLET AT BEDTIME; Therapy: 24Feb2021 to (Evaluate:51Lwu7574)  Requested for: 24Feb2021; Last   Rx:24Feb2021 Ordered   6. Brilinta 90 MG Oral Tablet; TAKE 1 TABLET TWICE DAILY; Therapy: 90Bvi2411 to (Evaluate:19Feb2022)  Requested for: 24Feb2021; Last   Rx:24Feb2021 Ordered   7. Buprenorphine 7.5 MCG/HR Transdermal Patch Weekly; Therapy: 32CDA6836 to Recorded   8. Diclofenac Sodium 1 % External Gel; APPLY SPARINGLY TO AFFECTED AREA(S) ONCE   DAILY; Therapy: 41NVJ1124 to Recorded   9.  MG Oral Capsule; take 1 cap daily; Therapy: 79LOU7005 to Recorded   10. Doxepin HCl - 10 MG Oral Capsule; TAKE 1 TO 2 CAPSULES AT BEDTIME AS NEEDED    FOR ITCHING; Therapy: 45ITG4644 to Recorded   11. Furosemide 40 MG Oral Tablet; Take one tab daily; Therapy: 73Zsb5600 to (Evaluate:19Feb2022)  Requested for: 24Feb2021; Last    Rx:24Feb2021 Ordered   12. Gabapentin 400 MG Oral Capsule; 1 Cap in the morning 2 at Night; Therapy: (Recorded:01Cuk7146) to Recorded   13. Haloperidol 5 MG Oral Tablet; TAKE 1 TABLET TWICE DAILY; Therapy: (Recorded:57Lnk1342) to Recorded   14. hydrALAZINE HCl - 50 MG Oral Tablet; TAKE 1 TABLET 3 TIMES DAILY; Therapy: 86Svh0014 to (Evaluate:82Til3074)  Requested for: 24Feb2021; Last    Rx:24Feb2021 Ordered   15. hydrOXYzine HCl - 25 MG Oral Tablet; TAKE 1 TABLET 3 TIMES DAILY AS NEEDED; Therapy: (Gayl Hazy) to Recorded   16. Isosorbide Dinitrate 20 MG Oral Tablet; TAKE 1 TABLET BY MOUTH EVERY 8 HOURS;     Therapy: 42Kwe5401 to (Evaluate:58Pyr3232)  Requested for: 34Lhi0750; Last    Rx:95Ayt0002 Ordered   17. Lantus 100 UNIT/ML Subcutaneous Solution; INJECT SUBCUTANEOUSLY AS    DIRECTED; Therapy: (Mitzy Levine) to Recorded   18. Levothyroxine Sodium 50 MCG Oral Tablet; 1 TABLET DAILY; Therapy: 67Iev3313 to Recorded   19. Meclizine HCl - 25 MG Oral Tablet; TAKE 1 TABLET 3 TIMES DAILY AS NEEDED; Therapy: (Mitzy Levine) to Recorded   20. Metoprolol Succinate ER 50 MG Oral Tablet Extended Release 24 Hour; TAKE 1 TABLET    TWICE A DAY  Requested for: 74EKY3152; Last Rx:34Mlz9700 Ordered   21. Montelukast Sodium 10 MG Oral Tablet; TAKE 1 TABLET AT BEDTIME; Therapy: (Mitzy Levine) to Recorded   22. Nitroglycerin 0.4 MG Sublingual Tablet Sublingual; PLACE 1 TABLET UNDER THE    TONGUE EVERY 5 MINUTES FOR UP TO 3 DOSES AS NEEDED FOR CHEST    PAIN. CALL 911 IF PAIN PERSISTS  Requested for: 62FTT2639; Last Rx:27Tfc7817    Ordered   23. Prazosin HCl - 2 MG Oral Capsule; TAKE 1 CAPSULE EVERY 12 HOURS DAILY; Therapy: (Mitzy Levine) to Recorded   24. Ranolazine  MG Oral Tablet Extended Release 12 Hour; TAKE 1 TABLET EVERY    12 HOURS; Therapy: 56LZW2129 to Recorded   25. Restasis 0.05 % Ophthalmic Emulsion; INSTILL 1 DROP IN [facility] EYE TWICE DAILY; Therapy: 97WBK1806 to Recorded   26. Sertraline HCl - 100 MG Oral Tablet; TAKE 2 TABLETS DAILY; Therapy: (Mitzy Levine) to Recorded   27. Trulicity 0.85 RK/9.6GJ Subcutaneous Solution Pen-injector; Inject 0.5Ml subcu nightly; Therapy: 31XAU7450 to Recorded    Reviewed meds with PT list.DS MA     Allergies   1. Ambien TABS   nervousness; Insomnia; Recorded By: Italia Hall; 04/17/2019 3:02:20 PM   2. Capoten TABS   Cough; Palpitations; Recorded By: Italia Hall; 04/17/2019 3:02:20 PM   3. Cogentin   Palpitations; Recorded By: Italia Hall; 04/17/2019 3:02:20 PM   4. Geodon CAPS   vision problems; Sleeplessness; Recorded By: Italia Hall; 04/17/2019 3:02:20 PM   5. KlonoPIN TABS   Palpitations; Recorded By: Italia Hall; 04/17/2019 3:02:20 PM   6.  Navane CAPS Headache; Recorded By: Renee Byrne; 04/17/2019 3:02:20 PM   7. Remeron   Nausea; Vomiting; Recorded By: Renee Byrne; 04/17/2019 3:02:20 PM   8. RisperDAL TABS   Nausea; Recorded By: Renee Byrne; 04/17/2019 3:02:20 PM   9. SEROquel TABS   Palpitations; Recorded By: Renee Byrne; 04/17/2019 3:02:20 PM   10. Abilify   Recorded By: Houston Nissen; 11/08/2019 11:02:43 AM   11. Depakote ER TB24   unsteady, falls; Recorded By: Renee Byrne; 04/17/2019 3:02:20 PM   12. Effexor TABS   patient states she felt high on medication; Recorded By: Renee Byrne; 04/17/2019 3:02:20 PM          Objective:   Vitals:    08/25/21 0728 08/25/21 0800 08/25/21 1923 08/26/21 0758   BP: (!) 121/49  138/63 124/73   Pulse: 61 61 70 66   Resp: 18  18    Temp: 98.1 °F (36.7 °C)  98.2 °F (36.8 °C) 97.9 °F (36.6 °C)   TempSrc:       SpO2: 96%  100% 92%   Weight:       Height:          Wt Readings from Last 3 Encounters:   08/24/21 241 lb 10 oz (109.6 kg)   08/15/21 220 lb (99.8 kg)   06/29/21 240 lb (108.9 kg)       TELEMETRY: normal sinus                             Rhythm Strip: occ pvc    Physical Exam:  General Appearance: alert and oriented to person, place and time, in no acute distress, anxious  Cardiovascular: normal rate, regular rhythm, normal S1 and S2, no murmurs, rubs, clicks, or gallops,  no JVD  Pulmonary/Chest: clear to auscultation bilaterally- no wheezes, rales or rhonchi, normal air movement, no respiratory distress  Abdomen: soft, non-tender, non-distended, normal bowel sounds, no masses   Extremities: no cyanosis, clubbing or edema  Skin: warm and dry, no rash or erythema  Eyes: EOMI  Neck: supple and non-tender without mass, no thyromegaly   Neurological: alert, oriented, normal speech, no focal findings or movement disorder noted    Allergies:   Allergies   Allergen Reactions    Ambien [Zolpidem Tartrate]     Capoten [Captopril]     Clioquinol     Cogentin [Benztropine]     Depakote [Divalproex Sodium]     Effexor Xr [Venlafaxine Hcl Er]     Geodon [Ziprasidone Hcl]     Lisinopril      Hyperkalemia: 20 potassium was 6.7    Lyrica [Pregabalin]     Navane [Thiothixene]     Pamelor [Nortriptyline Hcl]     Remeron [Mirtazapine]     Risperdal [Risperidone]     Trazodone And Nefazodone     Wellbutrin [Bupropion]         Medications:    sodium chloride flush  5-40 mL IntraVENous 2 times per day    enoxaparin  40 mg Subcutaneous Daily    levofloxacin  250 mg IntraVENous Q24H    insulin glargine  50 Units Subcutaneous Nightly    insulin lispro  0-12 Units Subcutaneous TID WC    insulin lispro  0-6 Units Subcutaneous Nightly    amitriptyline  25 mg Oral Nightly    aspirin  81 mg Oral Daily    atorvastatin  80 mg Oral Nightly    folic acid  1 mg Oral Daily    furosemide  40 mg Oral Daily    gabapentin  600 mg Oral BID    haloperidol  5 mg Oral Daily    hydrOXYzine  25 mg Oral TID    levothyroxine  50 mcg Oral Daily    meclizine  25 mg Oral TID    montelukast  10 mg Oral Nightly    neomycin-polymyxin-dexameth  1 drop Both Eyes 4 times per day    prazosin  2 mg Oral Nightly    sertraline  200 mg Oral Daily    ticagrelor  90 mg Oral BID    ranolazine  1,000 mg Oral BID    metoprolol succinate  100 mg Oral Daily    isosorbide mononitrate  30 mg Oral Daily    hydrALAZINE  50 mg Oral 2 times per day      sodium chloride      dextrose       sodium chloride flush, 10 mL, PRN  sodium chloride flush, 5-40 mL, PRN  sodium chloride, 25 mL, PRN  ondansetron, 4 mg, Q8H PRN   Or  ondansetron, 4 mg, Q6H PRN  acetaminophen, 650 mg, Q6H PRN   Or  acetaminophen, 650 mg, Q6H PRN  polyethylene glycol, 17 g, Daily PRN  glucose, 15 g, PRN  dextrose, 12.5 g, PRN  glucagon (rDNA), 1 mg, PRN  dextrose, 100 mL/hr, PRN        Diagnostics:      EK/23/21: no further RBBB.    2021: Normal sinus rhythm right bundle branch block. Compared to study of  right bundle branch block is a new finding.     Echo: LVEF decreased, RVSP increased. Left ventricular ejection fraction is visually estimated at 35-40%. Severe anteroseptal hypokinesis   Papillary muscle hypertrophy   Diastolic dysfunction   Mildly enlarged right ventricle cavity. Right ventricle global systolic function is mildly reduced . Right ventricular systolic pressure of 62 mm Hg consistent with severe   pulmonary hypertension. Normal tricuspid valve structure and function. Moderate-to-severe tricuspid regurgitation. Normal pulmonic valve structure   2+ PI, No PS   Normal right atrium. Normal mitral valve structure   Mild MV leaflet thickening   2+ MR       Mildly dilated left atrium. Lexiscan Myoview Stress: 8/25/21  1.  Normal LV systolic function at 46% without obvious segmental wall motion. 2.  Normal TID calculation at 1.09, though visually upper normal.   3.  Borderline anterior septal ischemia.  However, the patient is known to have patent left internal mammary artery just five months ago on angiography.  With this information and the patient's clinical course it  is more likely this defect is related to soft tissue breast attenuation artifact.  Clinical correlation is advised. CXR:           Portable: Results for orders placed during the hospital encounter of 08/21/21    XR CHEST PORTABLE    Narrative  EXAMINATION: CHEST PORTABLE VIEW    CLINICAL HISTORY: Midsternal chest pain    COMPARISONS: April 6, 2021 0143 hours    FINDINGS:    Single  views of the chest is submitted. There are multiple median sternotomy wires. The cardiac silhouette is enlarged  Pulmonary vascular unremarkable. Right sided trachea. Atelectasis, patchy infiltrate right lower lobe. No Pneumothoraces. Impression  ATELECTASIS, PATCHY INFILTRATE RIGHT LOWER LOBE.       Lab Data:    Cardiac Enzymes:  Recent Labs     08/24/21  0603 08/25/21  0630   TROPONINI 0.045* 0.050*     Component      Latest Ref Rng & Units 4/6/2021 4/6/2021 9/30/2020 9/1/2020 8:21 AM  2:00 AM  5:46 AM  8:07 PM   Troponin      0.000 - 0.010 ng/mL 0.335 (HH) 0.310 (HH) <0.010 <0.010     Component      Latest Ref Rng & Units 6/29/2020           6:36 AM   Troponin      0.000 - 0.010 ng/mL <0.010       ProBNP:   Lab Results   Component Value Date    PROBNP 3,242 08/21/2021       CBC:   Recent Labs     08/24/21 0603 08/25/21  0630 08/26/21  0829   WBC 8.6 8.4 10.2   RBC 4.04* 4.06* 4.09*   HGB 10.5* 10.6* 10.8*   HCT 31.2* 31.5* 31.6*    158 156       CMP:    Recent Labs     08/24/21 0603 08/24/21 0603 08/25/21  0630 08/26/21  0829     --  136 135   K 4.3  4.3   < > 4.0  4.0 4.9   CL 99  --  100 99   CO2 26  --  28 28   BUN 31*  --  30* 26*   CREATININE 1.60*  --  1.54* 1.29*   GFRAA 39.3*  --  41.1* 50.4*   LABGLOM 32.5*  --  33.9* 41.6*   GLUCOSE 127*  --  101* 157*   CALCIUM 8.6  --  8.5 8.5    < > = values in this interval not displayed. Hepatic Function Panel:    Recent Labs     08/24/21 0603 08/25/21  0630 08/26/21  0829   ALKPHOS 85 85 81   ALT 16 20 16   AST 20 23 21   PROT 6.8 6.5 6.5   BILITOT 0.5 0.5 0.6   LABALBU 3.5 3.5 3.3*       Magnesium:    Recent Labs     08/24/21  0603   MG 2.3       PT/INR:    No results for input(s): PROTIME, INR in the last 72 hours. Lipids:    Recent Labs     08/24/21 0603 08/25/21  0630 08/26/21  0829   CHOL 102 86 88   TRIG 76 50 60   HDL 43 42 40   LDLCALC 44 34 36       Principal Problem:    Pneumonia  Active Problems:    Diabetes mellitus ([facility] [address]    Hypertension    Hypothyroid    Chest pain    CAD (coronary artery disease)    NSTEMI (non-ST elevated myocardial infarction) (HCC)    Elevated troponin    CKD (chronic kidney disease) stage 3, GFR 30-59 ml/min (HCC)    Pain in right hand  Resolved Problems:    * No resolved hospital problems.  *           Electronically signed by Carina Holloway MD on 8/26/2021 at 9:59 AM

## 2021-08-26 NOTE — PROGRESS NOTES
Physician Progress Note      PATIENT:               Kadi Ramachandran  CSN #:                  [id number]  :                       1955  ADMIT DATE:       2021 9:08 PM  [address] [place] DATE:  RESPONDING  PROVIDER #:        Stacey Haas MD        QUERY TEXT:    Type of Anemia: Please provide further specificity, if known. Clinical indicators include: nemia, bleeding, ckd, chronic kidney disease, 6   units, rbc, hgb, hct, kd, hg  Options provided:  -- Anemia due to acute blood loss  -- Anemia due to chronic blood loss  -- Anemia due to iron deficiency  -- Anemia due to postoperative blood loss  -- Anemia due to chronic disease  -- Other - I will add my own diagnosis  -- Disagree - Not applicable / Not valid  -- Disagree - Clinically Unable to determine / Unknown        PROVIDER RESPONSE TEXT:    The patient has anemia due to chronic disease. QUERY TEXT:    Noted documentation of MI type 2 by cardiology on  and NSTEMI by attending   in d/c summary. If possible, please document in progress notes and discharge summary if you   are evaluating and /or treating any of the following: The medical record reflects the following:  Risk Factors: pneumonia, CKD 3, PHTN,  Clinical Indicators:  Henry-Elevated troponins, low flat pattern,   not acute true myocardial infarction, due to demand type 2 MI.  Negative   Lexiscan Myoview stress test for significant MI or Ischemia, Normal LV   function, LVEF 56%, 21  troponin 0.089/0.079/0.080/0.045; Chest discomfort noted as chronic by   cardiology  Treatment: stress test, serial troponin, EKG, cardiology consult, supportive   cardiovascular supportive care    Thank you, Esther Stone RN BSN [facility]  [phone]  Options provided:  -- MI type 2 confirmed and NSTEMI ruled out  -- NSTEMI confirmed and MI type 2 ruled out  -- Other - I will add my own diagnosis  -- Disagree - Not applicable / Not valid  -- Disagree - Clinically unable to determine / Unknown  -- Refer to Clinical Documentation Reviewer    PROVIDER RESPONSE TEXT:    After study, MI type 2confirmed and NSTEMI ruled out.     Query created by: Woo Jacob on 8/26/2021 1:03 PM      Electronically signed by:  Hailey Allred MD 8/26/2021 3:40 PM

## 2021-08-26 NOTE — DISCHARGE INSTR - COC
Continuity of Care Form    Patient Name: Shira Graf   :  1955  MRN:  [id number]    Admit date:  2021  Discharge date:  ***    Code Status Order: Full Code   Advance Directives:     Admitting Physician:  No admitting provider for patient encounter.   PCP: Enrrique Otto    Discharging Nurse: [facility] Unit/Room#: Z126/K711-14  Discharging Unit Phone Number: ***    Emergency Contact:   Extended Emergency Contact Information  Primary Emergency Contact: [address] [place] [address] Phone: [phone]  Relation: Spouse    Past Surgical History:  Past Surgical History:   Procedure Laterality Date    CARDIAC SURGERY      CATARACT REMOVAL WITH IMPLANT Bilateral 11- 11-     SECTION      COLONOSCOPY N/A 2019    COLONOSCOPY DIAGNOSTIC performed by Fred Murray MD at [address] [place] [address] GRAFT  2019    unknown vessels    HYSTERECTOMY      TOE AMPUTATION Right     3rd toe       Immunization History:   Immunization History   Administered Date(s) Administered    COVID-19, Moderna, PF, 100mcg/0.5mL 2021, 2021       Active Problems:  Patient Active Problem List   Diagnosis Code    Severe major depression with psychotic features ([address] F32.3    Diabetes mellitus ([address] E11.9    Hypertension I10    HLD (hyperlipidemia) E78.5    Hypothyroid E03.9    Congestive heart failure ([address] I50.9    Non-pressure ulcer of toe ([address] L97.509    Atherosclerotic PVD with intermittent claudication ([address] I70.219    Acute osteomyelitis ([address] M86.10    Infection of skin L08.9    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 A49.8    Surgical wound dehiscence, initial encounter T81.31XA    S/P amputation of lesser toe, right (HCC) Z89.421    Rectal bleeding K62.5    Athscl native arteries of left leg w ulceration oth prt foot (HCC) I70.245    JESICA (obstructive sleep apnea) G47.33    Secondary diabetes mellitus ([facility] E13.9    Chest pain R07.9    Peripheral vascular disease ([facility]) I73.9    Cellulitis L03.90    Syncope and collapse R55    Severe mitral regurgitation I34.0    Ischemic myocardial dysfunction I25.5    Pulmonary hypertension ([facility]) I27.20    Acute on chronic combined systolic and diastolic congestive heart failure ([facility]) I50.43    Nocturnal hypoxia G47.34    RADHA (acute kidney injury) ([facility]) N17.9    Dizziness R42    Acute cerebrovascular accident ([facility] I63.9    Abnormal gait R26.9    Anxiety F41.9    Gastritis, unspecified, without bleeding K29.70    Pure hypercholesterolemia E78.00    Schizophrenia, unspecified ([facility]) F20.9    CAD (coronary artery disease) I25.10    Extrapyramidal disease G25.9    Gangrene of toe of left foot ([facility]) I96    Drug-induced hypotension I95.2    Osteomyelitis of right foot ([facility]) M86.9    Nausea and vomiting R11.2    Mild intermittent asthma without complication T16.92    Heart failure, diastolic, with acute decompensation ([facility]) I50.33    Major depressive disorder, single episode, unspecified F32.9    Type 2 diabetes mellitus with diabetic neuropathy, unspecified ([facility]) E11.40    Obesity (BMI 30-39. 9) E66.9    Stenosis of carotid artery I65.29    NSTEMI (non-ST elevated myocardial infarction) ([facility]) I21.4    Elevated troponin R77.8    Pneumonia J18.9    CKD (chronic kidney disease) stage 3, GFR 30-59 ml/min ([facility]) N18.30    Pain in right hand M79.641       Isolation/Infection:   Isolation          Contact        Patient Infection Status     Infection Onset Added Last Indicated Last Indicated By Review Planned Expiration Resolved Resolved By    MRSA 08/09/19 08/11/19 03/05/20 Culture, Tissue        MRSA Left foot tissue on 3/5/2020. Electronically signed by Dallin Rain RN on 3/9/2020 at 2:14 PM     MRSA left 3rd toe on 8/9/2019.  Electronically signed by Dallin Rain RN on 8/11/2019 at 9:15 PM     Resolved    COVID-19 Rule Out 09/02/20 09/02/20 09/02/20 COVID-19 (Ordered)   09/02/20 Rule-Out Test Resulted          Nurse Assessment:  Last Vital Signs: /73   Pulse 66   Temp 97.9 °F (36.6 °C)   Resp 18   Ht 5' 4\" (1.626 m)   Wt 241 lb 10 oz (109.6 kg)   SpO2 92%   BMI 41.47 kg/m²     Last documented pain score (0-10 scale): Pain Level: 0  Last Weight:   Wt Readings from Last 1 Encounters:   08/24/21 241 lb 10 oz (109.6 kg)     Mental Status:  {IP PT MENTAL STATUS:20030}    IV Access:  { ARIANNE IV ACCESS:514166422}    Nursing Mobility/ADLs:  Walking   {CHP DME SXEW:158554761}  Transfer  {CHP DME CNUV:442521818}  Bathing  {CHP DME VJIX:343677254}  Dressing  {CHP DME XCQF:651402945}  Toileting  {CHP DME MITE:673671434}  Feeding  {CHP DME APYZ:328560306}  Med Admin  {CHP DME FWPH:920000089}  Med Delivery   { ARIANNE MED Delivery:731458145}    Wound Care Documentation and Therapy:        Elimination:  Continence:   · Bowel: {YES / MO:17345}  · Bladder: {YES / TT:94754}  Urinary Catheter: {Urinary Catheter:130005116}   Colostomy/Ileostomy/Ileal Conduit: {YES / IY:52817}       Date of Last BM: ***    Intake/Output Summary (Last 24 hours) at 8/26/2021 1213  Last data filed at 8/26/2021 1209  Gross per 24 hour   Intake 450 ml   Output    Net 450 ml     No intake/output data recorded.     Safety Concerns:     [address] Safety Concerns:612157239}    Impairments/Disabilities:      [address] Impairments/Disabilities:253067541}    Nutrition Therapy:  Current Nutrition Therapy:   [address] [employer] Diet List:589394209}    Routes of Feeding: {CHP DME Other Feedings:344783095}  Liquids: {Slp liquid thickness:83748}  Daily Fluid Restriction: {CHP DME Yes amt example:018519746}  Last Modified Barium Swallow with Video (Video Swallowing Test): {Done Not Done FKGT:561661998}    Treatments at the Time of Hospital Discharge:   Respiratory Treatments: ***  Oxygen Therapy:  {Therapy; copd oxygen:62174}  Ventilator:    {MH CC Vent XT:796282720}    Rehab Therapies: {THERAPEUTIC INTERVENTION:5247509396}  Weight Bearing Status/Restrictions: 50[facility] Viver[facility] CC Weight Bearin}  Other Medical Equipment (for information only, NOT a DME order):  {EQUIPMENT:033925217}  Other Treatments: ***    Patient's personal belongings (please select all that are sent with patient):  {CHP DME Belongings:802105491}    RN SIGNATURE:  {Esignature:817605800}    CASE MANAGEMENT/SOCIAL WORK SECTION    Inpatient Status Date: ***    Readmission Risk Assessment Score:  Readmission Risk              Risk of Unplanned Readmission:  38           Discharging to Facility/ Agency   · Name:   · Address:  · Phone:  · Fax:    Dialysis Facility (if applicable)   · Name:  · Address:  · Dialysis Schedule:  · Phone:  · Fax:    / signature: {Esignature:228237793}    PHYSICIAN SECTION    Prognosis: {Prognosis:0010509422}    Condition at Discharge: Kenia Viveros Patient Condition:979402784}    Rehab Potential (if transferring to Rehab): {Prognosis:5145423546}    Recommended Labs or Other Treatments After Discharge: ***    Physician Certification: I certify the above information and transfer of Payal Favor  is necessary for the continuing treatment of the diagnosis listed and that she requires {Admit to Appropriate Level of Care:57113} for {GREATER/LESS:744119987} 30 days.      Update Admission H&P: {CHP DME Changes in [facility]:067313744}    PHYSICIAN SIGNATURE:  {Esignature:324955748}

## 2021-08-26 NOTE — FLOWSHEET NOTE
DC  Instructions given to pt, pt verbalized understanding of info.  Pt left floor via wc to front where  picking up Electronically signed by Shanta Weir RN on 8/26/2021 at 3:46 PM

## 2021-08-26 NOTE — DISCHARGE INSTR - DIET

## 2021-09-08 PROBLEM — F33.3 MAJOR DEPRESSIVE DISORDER, RECURRENT, SEVERE WITH PSYCHOTIC SYMPTOMS (HCC): Status: ACTIVE | Noted: 2019-02-22

## 2021-09-08 PROBLEM — R20.2 PARESTHESIA OF SKIN: Status: ACTIVE | Noted: 2021-04-01

## 2021-09-08 PROBLEM — J45.909 ASTHMA: Status: ACTIVE | Noted: 2019-03-24

## 2021-09-08 PROBLEM — Z98.62 HISTORY OF ANGIOPLASTY OF PERIPHERAL VESSEL: Status: ACTIVE | Noted: 2021-01-01

## 2021-09-08 PROBLEM — M86.9 OSTEOMYELITIS (HCC): Status: ACTIVE | Noted: 2019-05-14

## 2021-09-08 PROBLEM — R20.0 ANESTHESIA OF SKIN: Status: ACTIVE | Noted: 2021-04-01

## 2021-09-08 PROBLEM — G56.00 CARPAL TUNNEL SYNDROME: Status: ACTIVE | Noted: 2021-05-13

## 2021-09-08 PROBLEM — I77.9 DISORDER OF CAROTID ARTERY (HCC): Status: ACTIVE | Noted: 2021-03-02

## 2021-09-08 PROBLEM — E07.9 DISORDER OF THYROID: Status: ACTIVE | Noted: 2019-02-25

## 2021-09-08 PROBLEM — Z95.1 HISTORY OF CORONARY ARTERY BYPASS SURGERY: Status: ACTIVE | Noted: 2021-01-01

## 2021-09-08 NOTE — ED TRIAGE NOTES
Arrived to the ER for c/o diarrhea. States was hospitalized last week for pneumonia and sent home on Augmentin. Since starting oral antibiotics began to have diarrhea. Reports unable to tolerate po intake d/t diarrhea. +nausea with occasional vomiting, chills. Denies pain, fever, CP, SOB, rectal bleeding, or mucus in stools.     Bo Duarte #[id number]

## 2021-09-09 NOTE — TELEPHONE ENCOUNTER
Patient is calling because the [employer] [place] needs prior authorization. Please start this for the patient. Thanks.

## 2021-09-09 NOTE — PROGRESS NOTES
Subjective:      Patient ID: Camron Silvestre is a 63 y.o. female who presents today for:  Chief Complaint   Patient presents with    Follow-up     Pt states she is going well. She was wondering if she should be on any medications form us in regards to the stroke. HPI  9/9/2021:  Patient seen and examined for 6-month follow-up for right frontal lobe CVA that occurred in May 2020. Currently alert and oriented x3, no acute distress, cooperative. Patient is  speaking and  was used. Patient continues on dual antiplatelet therapy with Brilinta and aspirin. Tolerating this without adverse effects or unusual signs and symptoms of bleeding or bruising. She has had no TIA episodes or recurrent strokelike symptoms. No focal neuro deficits. No dizziness. Patient is ambulating with a Rollator without difficulty. Recent hospital admission for pneumonia. She is now on continuous oxygen therapy. She is using CPAP at at bedtime. He is followed by pulmonology. She continues on high intensity statin. Last lipid panel done on 8/6/2021 with total cholesterol 88, triglycerides 60, HDL 40, LDL 36. Patient does have known carotid stenosis. Repeat carotid duplex is due to be done. Patient reports that overall she is doing well and has no acute neuro complaints or concerns. 3/9/2021:  Patient seen and examined for 6-month follow-up for vertigo with posterior right frontal lobe possible CVA. She is currently alert and oriented x3, no acute distress, cooperative. She is  speaking and  was used. Patient continues on dual antiplatelet therapy with Brilinta and aspirin. There is been no recurrent strokelike symptoms or new focal neuro deficits. Dizziness is resolved. No falls reported. She is ambulating with Rollator. She reports daily compliance with dual antiplatelet therapy and denies signs and symptoms of unusual bleeding or bruising.   She continues on high intensity statin. She is now compliant with using CPAP for obstructive sleep apnea. She is followed by pulmonology. She voices no new or acute complaints or concerns today and states that overall she is doing well.     9/8/2020:  Pt seen and examined in the office for 3-month follow-up visit for vertigo with posterior right frontal lobe possible CVA.  Although upon review of MRI Dr. Pack Staff thought that this could possibly be a vessel and not an acute CVA. .  Patient was last seen on 6/9/2020.  She is currently alert and oriented x3, no acute distress, cooperative. Nga Darling remains on dual antiplatelet therapy with Brilinta and aspirin.  No unusual signs or symptoms of bleeding or bruising.  Dizziness resolved.  No focal deficits.  Patient does have known carotid stenosis 50 to 69% in the left internal carotid artery.  Patient's only complaint today is of insomnia.  She reports that she is unable to sleep at times due to anxiety.  Patient is already tried melatonin with no good effect.  She has multiple allergies to typical medications that we used to treat insomnia such as Ambien, mirtazapine, trazodone. Nga Darling is also has multiple allergies to antidepressants and SSRIs which we would use to treat her anxiety. She is being followed by pulmonology and had a sleep study on 1019 that showed severe obstructive sleep apnea with need for CPAP.  Patient reports she is not using CPAP.     6/9/2020:  Pt seen and examined in the office for hospital follow up. Pt was admitted to [facility] from 5/6/2020 to 5/11/2020 for acute onset vertigo with nausea and vomiting.  Hospital records reviewed.  CT the head was done that was negative for acute intracranial process MRI of the brain was done and.  Report identified a small focus of restricted diffusion involving the posterior right frontal lobe consistent with possible CVA.  Dr. Pack Staff reviewed MRI and thought that possibly this could be a vessel and not an acute CVA.  Patient was on dual antiplatelet therapy at the time of presentation with aspirin and Brilinta.  She has significant cardiac history.  Patient was noted to be hyponatremic at the time of presentation with sodium level of 128.  This normalized prior to discharge.  Dizziness was thought to be multifactorial related to underlying multiple medications and cardiorespiratory dysfunction.  Dizziness did improve prior to discharge. Addis Stevenson was continued on the same dual antiplatelet therapy.  Carotid duplex was reviewed from 4/21/2020 with noted 50 to 69% stenosis of the left internal carotid artery.  Patient's hemoglobin A1c at that time was 7.7.  She has since had it rechecked on 5/29/2020 and it has increased to 8.2.  She reports she has an appointment with endocrinology next week.  Patient was discharged to home. Wilfrido Dawkins is current with home health care. Addis Stevenson has been initiated on oxygen therapy at night and is followed by pulmonology.  To possibly have outpatient sleep study. Patient currently alert and oriented x3, no acute distress, cooperative. Wilfrido Dawkins is accompanied to appointment by her  and an  as she is  speaking. Addis Stevenson reports that overall she is doing well.  Denies any further vertigo.  She reports compliance with dual antiplatelet therapy and statin. Wilfrido Dawkins denies any unusual bleeding or bruising or myalgias.  She voices no new or acute neuro complaints or concerns today.   Past Medical History:   Diagnosis Date    Asthma     CAD (coronary artery disease)     CKD (chronic kidney disease) stage 3, GFR 30-59 ml/min ([facility])     Colitis     Diabetes mellitus ([address]     Hyperlipidemia     Hypertension     PAD (peripheral artery disease) ([facility])     Prolonged emergence from general anesthesia     PVD (peripheral vascular disease) ([address]     Thyroid disease      Past Surgical History:   Procedure Laterality Date    CARDIAC SURGERY      CATARACT REMOVAL WITH IMPLANT Bilateral 11- 2020     SECTION      COLONOSCOPY N/A 2019    COLONOSCOPY DIAGNOSTIC performed by Cindi Chen MD at [address] [facility]  2019    unknown vessels    HYSTERECTOMY      TOE AMPUTATION Right     3rd toe     Social History     Socioeconomic History    Marital status:      Spouse name: Not on file    Number of children: Not on file    Years of education: Not on file    Highest education level: Not on file   Occupational History    Not on file   Tobacco Use    Smoking status: Never Smoker    Smokeless tobacco: Never Used   Vaping Use    Vaping Use: Never used   Substance and Sexual Activity    Alcohol use: Never    Drug use: Never    Sexual activity: Not on file   Other Topics Concern    Not on file   Social History Narrative         Lives With: Spouse    Type of Home: [address] [place] [address] [address]: One level    Home Access: Elevator    Bathroom Shower/Tub: Tub/Shower unit(Simultaneous filing. User may not have seen previous data.)    Bathroom Equipment: Grab bars in shower, Shower chair    Home Equipment: Rolling walker, [address] 450 bed    ADL Assistance: Needs assistance    Homemaking Assistance: Needs assistance    Homemaking Responsibilities: No    Ambulation Assistance: Independent    Transfer Assistance: Independent    Active : No    Additional Comments: Pt wears orthopedic shoes at baseline    The patient states she is current with [facility](RN per the ). The patient had a walker, but obtained a hospital bed, wheel chair, BSC and O2 last admission (from [address] [place] [address] Road). pharmacy is [address] [place] [address],[address] [id number]., [facility].       Transportation is provided by [employer] () per the patient     Social Determinants of Health     Financial Resource Strain:     Difficulty of Paying Living Expenses:    Food Insecurity:     Worried About Running Out of Food in the Last Year:    [address] in the Last Year:    Transportation Needs:     Lack of Transportation (Medical):  Lack of Transportation (Non-Medical):    Physical Activity:     Days of Exercise per Week:     Minutes of Exercise per Session:    Stress:     Feeling of Stress :    Social Connections:     Frequency of Communication with Friends and Family:     Frequency of Social Gatherings with Friends and Family:     Attends Worship Services:     Active Member of Clubs or Organizations:     Attends Club or Organization Meetings:     Marital Status:    Intimate Partner Violence:     Fear of Current or Ex-Partner:     Emotionally Abused:     Physically Abused:     Sexually Abused:      No family history on file.   Allergies   Allergen Reactions    Ambien [Zolpidem Tartrate]     Capoten [Captopril]     Clioquinol     Cogentin [Benztropine]     Depakote [Divalproex Sodium]     Effexor Xr [Venlafaxine Hcl Er]     Geodon [Ziprasidone Hcl]     Lisinopril      Hyperkalemia: 4/21/20 potassium was 6.7    Lyrica [Pregabalin]     Navane [Thiothixene]     Pamelor [Nortriptyline Hcl]     Remeron [Mirtazapine]     Risperdal [Risperidone]     Trazodone And Nefazodone     Wellbutrin [Bupropion]      Current Outpatient Medications on File Prior to Visit   Medication Sig Dispense Refill    ammonium lactate (LAC-HYDRIN) 12 % lotion       atorvastatin (LIPITOR) 80 MG tablet take 1 tablet by mouth at bedtime      diclofenac sodium (VOLTAREN) 1 % GEL apply 4 grams to affected area three times a day AS NEEDED FOR PAIN      doxepin (SINEQUAN) 25 MG capsule       loperamide (RA ANTI-DIARRHEAL) 2 MG capsule Take 1 capsule by mouth 4 times daily as needed for Diarrhea 40 capsule 0    ondansetron (ZOFRAN-ODT) 4 MG disintegrating tablet Take 1 tablet by mouth 3 times daily as needed for Nausea or Vomiting 21 tablet 0    ranolazine (RANEXA) 1000 MG extended release tablet Take 1 tablet by mouth 2 times daily 60 tablet 3    gabapentin (NEURONTIN) 600 MG tablet Take 600 mg by mouth 2 times daily.       insulin lispro, 1 Unit Dial, (HUMALOG KWIKPEN) 100 UNIT/ML SOPN 15  units at each meals 10 pen 03    albuterol sulfate HFA (VENTOLIN HFA) 108 (90 Base) MCG/ACT inhaler Inhale 2 puffs into the lungs as needed for Wheezing Every 4-6 hours PRN 1 Inhaler 5    montelukast (SINGULAIR) 10 MG tablet Take 1 tablet by mouth nightly 30 tablet 5    Continuous Blood Gluc Sensor (FREESTYLE FELY 14 DAY SENSOR) MISC Every 2 weeks 2 each 06    Dulaglutide (TRULICITY) 7.96 YX/6.4KR SOPN Inject 0.75 mg into the skin once a week 4 pen 3    Insulin Pen Needle ([employer] PEN NEEDLES 31G) 31G X 8 MM MISC 1 each by Does not apply route 4 times daily 300 each 3    levothyroxine (SYNTHROID) 50 MCG tablet take 1 tablet by mouth once daily 30 tablet 5    Alcohol Swabs (ALCOHOL PREP) 70 % PADS qid 300 each 06    Insulin Pen Needle (NOVOFINE) 32G X 6 MM MISC qid 300 each 3    insulin glargine (LANTUS SOLOSTAR) 100 UNIT/ML injection pen 50 units at bedtime 15 pen 3    oxybutynin (DITROPAN-XL) 5 MG extended release tablet take 1 tablet by mouth once daily 90 tablet 3    RESTASIS 0.05 % ophthalmic emulsion       neomycin-polymyxin-dexameth (MAXITROL) 3.5-03737-7.1 ophthalmic suspension instill 1 drop into both eyes four times a day      ARTIFICIAL TEARS 1.4 % ophthalmic solution       docusate sodium (COLACE, DULCOLAX) 100 MG CAPS Take 100 mg by mouth 2 times daily 60 capsule 1    prazosin (MINIPRESS) 2 MG capsule Take 2 mg by mouth nightly       sertraline (ZOLOFT) 100 MG tablet Take 200 mg by mouth daily       Continuous Blood Gluc Sensor (FREESTYLE FELY 14 DAY SENSOR) MISC Every 2 weeks 2 each 06    Continuous Blood Gluc  (FREESTYLE FELY 14 DAY READER) CATHLEEN As directed 1 Device 00    furosemide (LASIX) 40 MG tablet Take 1 tablet by mouth daily 60 tablet 3    ticagrelor (BRILINTA) 90 MG TABS tablet Take 90 mg by mouth 2 times daily      CPAP Machine MISC by Does not apply route New CPAP with 10 cm 1 each 0    aspirin 81 MG EC tablet Take 1 tablet by mouth daily 30 tablet 3    OXYGEN 2 lit O2 with sleep , please give O2 concentrator 1 Units 0    Emollient (EUCERIN INTENSIVE REPAIR HAND) 2.5-10 % CREA APPLY TO FEET AT BEDTIME; PLACE SOCKS OVER FEET AFTER APPLICATION IF NEEDED FOR DRY CRACKING FEET      hydrOXYzine (VISTARIL) 25 MG capsule Take 50 mg by mouth 3 times daily as needed       Nutritional Supplements (GLUCERNA SHAKE) LIQD take as directed three times a day      isosorbide dinitrate (ISORDIL) 20 MG tablet Take 1 tablet by mouth 3 times daily 90 tablet 3    nitroGLYCERIN (NITROSTAT) 0.4 MG SL tablet up to max of 3 total doses. If no relief after 1 dose, call 911. 25 tablet 3    hydrALAZINE (APRESOLINE) 50 MG tablet Take 1 tablet by mouth every 8 hours 90 tablet 3    metoprolol succinate (TOPROL XL) 50 MG extended release tablet Take 1 tablet by mouth 2 times daily 30 tablet 3    amitriptyline (ELAVIL) 25 MG tablet Take 25 mg by mouth nightly      haloperidol (HALDOL) 5 MG tablet Take 5 mg by mouth daily      FreeStyle Lancets MISC 1 each by Does not apply route daily 100 each 3    blood glucose test strips (FREESTYLE LITE) strip 1 each by In Vitro route daily As needed.  669 each 3    folic acid (FOLVITE) 1 MG tablet Take 1 mg by mouth daily      Iron Polysacch [id number] (NIFEREX-150 FORTE PO) Take 1 tablet by mouth daily       Cyanocobalamin (VITAMIN B-12) 1000 MCG extended release tablet Take 1,000 mcg by mouth daily      meclizine (ANTIVERT) 25 MG tablet Take 25 mg by mouth three times daily      amoxicillin-clavulanate (AUGMENTIN) 875-125 MG per tablet Take 1 tablet by mouth 2 times daily (Patient not taking: Reported on 9/9/2021)      albuterol (PROVENTIL) (2.5 MG/3ML) 0.083% nebulizer solution Take 3 mLs by nebulization every 6 hours as needed for Wheezing 120 each 5    gabapentin (NEURONTIN) 400 MG capsule Take 400 mg by mouth 2 times daily. AM and 800 mg in pm-9pm (Patient not taking: Reported on 9/9/2021)       No current facility-administered medications on file prior to visit. Review of Systems   Constitutional: Negative for appetite change, chills, fatigue and fever. HENT: Negative for hearing loss and trouble swallowing. Eyes: Negative for visual disturbance. Respiratory: Positive for shortness of breath. Negative for cough, chest tightness and wheezing. Cardiovascular: Negative for chest pain, palpitations and leg swelling. Gastrointestinal: Negative for abdominal distention, abdominal pain, nausea and vomiting. Genitourinary: Negative for difficulty urinating. Musculoskeletal: Positive for gait problem. Skin: Negative for color change and rash. Neurological: Negative for dizziness, tremors, seizures, syncope, facial asymmetry, speech difficulty, weakness, light-headedness, numbness and headaches. Psychiatric/Behavioral: Negative for agitation, confusion and hallucinations. The patient is not nervous/anxious. Objective:   BP (!) 142/65 (Site: Left Upper Arm, Position: Sitting, Cuff Size: Medium Adult)   Pulse 64   Wt 242 lb 11.2 oz (110.1 kg)   BMI 41.66 kg/m²     Physical Exam  Vitals reviewed. Constitutional:       General: She is not in acute distress. Appearance: She is obese. She is not diaphoretic. HENT:      Head: Normocephalic and atraumatic. Eyes:      General: No visual field deficit. Extraocular Movements: Extraocular movements intact. Pupils: Pupils are equal, round, and reactive to light. Cardiovascular:      Rate and Rhythm: Normal rate and regular rhythm. Pulmonary:      Effort: Pulmonary effort is normal. No respiratory distress. Breath sounds: Normal breath sounds. Abdominal:      General: Bowel sounds are normal.      Palpations: Abdomen is soft. Skin:     General: Skin is warm and dry. [facility])  -Patient with history of posterior right frontal lobe CVA versus vessel in May 2020. Nonfocal at this time. She continues on aspirin and Brilinta and high intensity statin. Will need ongoing attention to risk factor modification. Overall doing well today. - US CAROTID ARTERY BILATERAL; Future    2. Bilateral carotid artery stenosis  -Last carotid duplex done on 4/21/2020 showed 50 to 69% stenosis in the left internal carotid artery and less than 50% stenosis in the right internal carotid artery. Patient on dual antiplatelet therapy and high intensity statin. Will order repeat carotid duplex. - US CAROTID ARTERY BILATERAL; Future    3. Type 2 diabetes mellitus with diabetic neuropathy, with long-term current use of insulin ([facility]  4. Mixed hyperlipidemia  -Most recent lipid panel done on 8/26/2021 normal.  -Patient will need ongoing attention to risk factor modification  -Repeat hemoglobin A1c pending      Return in about 6 months (around 3/9/2022), or if symptoms worsen or fail to improve.     Carolin Lennox, APRN - CNP     Collaborating Physician Dr Elena Aiken

## 2021-09-09 NOTE — TELEPHONE ENCOUNTER
[facility] attempted to contact pt to notify her of ED follow up appt. Attempt unsuccessful. Voicemail left stating time and date of appt.

## 2021-09-09 NOTE — ED PROVIDER NOTES
Take 1 tablet by mouth 2 times dailyHistorical Med      ranolazine (RANEXA) 1000 MG extended release tablet Take 1 tablet by mouth 2 times daily, Disp-60 tablet, R-3Normal      gabapentin (NEURONTIN) 600 MG tablet Take 600 mg by mouth 2 times daily. Historical Med      insulin lispro, 1 Unit Dial, (HUMALOG KWIKPEN) 100 UNIT/ML SOPN 15  units at each meals, Disp-10 pen, R-03Normal      albuterol sulfate HFA (VENTOLIN HFA) 108 (90 Base) MCG/ACT inhaler Inhale 2 puffs into the lungs as needed for Wheezing Every 4-6 hours PRN, Disp-1 Inhaler, R-5Normal      albuterol (PROVENTIL) (2.5 MG/3ML) 0.083% nebulizer solution Take 3 mLs by nebulization every 6 hours as needed for Wheezing, Disp-120 each, R-5Normal      montelukast (SINGULAIR) 10 MG tablet Take 1 tablet by mouth nightly, Disp-30 tablet, R-5Normal      !! Continuous Blood Gluc Sensor (FREESTYLE FELY 14 DAY SENSOR) MISC Every 2 weeks, Disp-2 each, R-06Normal      Dulaglutide (TRULICITY) 3.68 CE/7.2AE SOPN Inject 0.75 mg into the skin once a week, Disp-4 pen, R-3Normal      !! Insulin Pen Needle ([employer] PEN NEEDLES 31G) 31G X 8 MM MISC 4 TIMES DAILY Starting Wed 6/23/2021, Disp-300 each, R-3, Normal      atorvastatin (LIPITOR) 40 MG tablet Take 2 tablets by mouth nightly, Disp-30 tablet, R-3Normal      levothyroxine (SYNTHROID) 50 MCG tablet take 1 tablet by mouth once daily, Disp-30 tablet, R-5Normal      Alcohol Swabs (ALCOHOL PREP) 70 % PADS Disp-300 each, R-06, Normalqid      !!  Insulin Pen Needle (NOVOFINE) 32G X 6 MM MISC Disp-300 each, R-3, Normalqid      insulin glargine (LANTUS SOLOSTAR) 100 UNIT/ML injection pen 50 units at bedtime, Disp-15 pen, R-3Normal      oxybutynin (DITROPAN-XL) 5 MG extended release tablet take 1 tablet by mouth once daily, Disp-90 tablet, R-3Normal      RESTASIS 0.05 % ophthalmic emulsion DAWHistorical Med      neomycin-polymyxin-dexameth (MAXITROL) 3.5-15971-0.1 ophthalmic suspension instill 1 drop into both eyes four times a dayHistorical Med      ARTIFICIAL TEARS 1.4 % ophthalmic solution DAWHistorical Med      docusate sodium (COLACE, DULCOLAX) 100 MG CAPS Take 100 mg by mouth 2 times daily, Disp-60 capsule,R-1Normal      prazosin (MINIPRESS) 2 MG capsule Take 2 mg by mouth nightly Historical Med      sertraline (ZOLOFT) 100 MG tablet Take 200 mg by mouth daily Historical Med      !! Continuous Blood Gluc Sensor (FREESTYLE FELY 14 DAY SENSOR) MISC Every 2 weeks, Disp-2 each,R-06Normal      Continuous Blood Gluc  (FREESTYLE FELY 14 DAY READER) CATHLEEN As directed, Disp-1 Device,R-00Normal      furosemide (LASIX) 40 MG tablet Take 1 tablet by mouth daily, Disp-60 tablet,R-3Normal      ticagrelor (BRILINTA) 90 MG TABS tablet Take 90 mg by mouth 2 times dailyHistorical Med      CPAP Machine MISC Starting Thu 8/20/2020, Disp-1 each,R-0, PrintNew CPAP with 10 cm      aspirin 81 MG EC tablet Take 1 tablet by mouth daily, Disp-30 tablet, R-3Normal      OXYGEN 2 lit O2 with sleep , please give O2 concentrator, Disp-1 Units, R-0Print      Emollient (EUCERIN INTENSIVE REPAIR HAND) 2.5-10 % CREA APPLY TO FEET AT BEDTIME; PLACE SOCKS OVER FEET AFTER APPLICATION IF NEEDED FOR DRY CRACKING FEETHistorical Med      hydrOXYzine (VISTARIL) 25 MG capsule Take 50 mg by mouth 3 times daily as needed Historical Med      Nutritional Supplements (GLUCERNA SHAKE) LIQD take as directed three times a dayHistorical Med      isosorbide dinitrate (ISORDIL) 20 MG tablet Take 1 tablet by mouth 3 times daily, Disp-90 tablet, R-3Normal      nitroGLYCERIN (NITROSTAT) 0.4 MG SL tablet up to max of 3 total doses.  If no relief after 1 dose, call 911., Disp-25 tablet, R-3Normal      hydrALAZINE (APRESOLINE) 50 MG tablet Take 1 tablet by mouth every 8 hours, Disp-90 tablet, R-3Normal      metoprolol succinate (TOPROL XL) 50 MG extended release tablet Take 1 tablet by mouth 2 times daily, Disp-30 tablet, R-3Normal      amitriptyline (ELAVIL) 25 MG tablet Take 25 mg by mouth nightlyHistorical Med      gabapentin (NEURONTIN) 400 MG capsule Take 400 mg by mouth 2 times daily. AM and 800 mg in pm-9pmHistorical Med      haloperidol (HALDOL) 5 MG tablet Take 5 mg by mouth dailyHistorical Med      FreeStyle Lancets MISC DAILY Starting Thu 1/30/2020, Disp-100 each, R-3, Normal      blood glucose test strips (FREESTYLE LITE) strip DAILY Starting Thu 1/30/2020, Disp-100 each, R-3, NormalAs needed. folic acid (FOLVITE) 1 MG tablet Take 1 mg by mouth dailyHistorical Med      Iron Polysacch Fggjx-L62-FH (NIFEREX-150 FORTE PO) Take 1 tablet by mouth daily Historical Med      Cyanocobalamin (VITAMIN B-12) 1000 MCG extended release tablet Take 1,000 mcg by mouth dailyHistorical Med      meclizine (ANTIVERT) 25 MG tablet Take 25 mg by mouth three times dailyHistorical Med       !! - Potential duplicate medications found. Please discuss with provider. ALLERGIES       Allergies   Allergen Reactions    Ambien [Zolpidem Tartrate]     Capoten [Captopril]     Clioquinol     Cogentin [Benztropine]     Depakote [Divalproex Sodium]     Effexor Xr [Venlafaxine Hcl Er]     Geodon [Ziprasidone Hcl]     Lisinopril      Hyperkalemia: 4/21/20 potassium was 6.7    Lyrica [Pregabalin]     Navane [Thiothixene]     Pamelor [Nortriptyline Hcl]     Remeron [Mirtazapine]     Risperdal [Risperidone]     Trazodone And Nefazodone     Wellbutrin [Bupropion]        FAMILY HISTORY     History reviewed. No pertinent family history.      SOCIAL HISTORY       Social History     Socioeconomic History    Marital status:      Spouse name: None    Number of children: None    Years of education: None    Highest education level: None   Occupational History    None   Tobacco Use    Smoking status: Never Smoker    Smokeless tobacco: Never Used   Vaping Use    Vaping Use: Never used   Substance and Sexual Activity    Alcohol use: Never    Drug use: Never    Sexual activity: None   Other Topics Concern    None   Social History Narrative         Lives With: Spouse    Type of Home: [address]: One level    Home Access: Elevator    Bathroom Shower/Tub: Tub/Shower unit(Simultaneous filing. User may not have seen previous data.)    Bathroom Equipment: Grab bars in shower, Shower chair    Home Equipment: Rolling walker, [address] 450 bed    ADL Assistance: Needs assistance    Homemaking Assistance: Needs assistance    Homemaking Responsibilities: No    Ambulation Assistance: Independent    Transfer Assistance: Independent    Active : No    Additional Comments: Pt wears orthopedic shoes at baseline    The patient states she is current with [facility](RN per the ). The patient had a walker, but obtained a hospital bed, wheel chair, BSC and O2 last admission (from [address]). pharmacy is [address] [place] [address],[address] [address]., [facility], [place]. Transportation is provided by [employer]	[place] () per the patient     Social Determinants of Health     Financial Resource Strain:     Difficulty of Paying Living Expenses:    Food Insecurity:     Worried About Running Out of Food in the Last Year:     [address] in the Last Year:    Transportation Needs:     Lack of Transportation (Medical):      Lack of Transportation (Non-Medical):    Physical Activity:     Days of Exercise per Week:     Minutes of Exercise per Session:    Stress:     Feeling of Stress :    Social Connections:     Frequency of Communication with Friends and Family:     Frequency of Social Gatherings with Friends and Family:     Attends Hindu Services:     Active Member of Clubs or Organizations:     Attends Club or Organization Meetings:     Marital Status:    Intimate Partner Violence:     Fear of Current or Ex-Partner:     Emotionally Abused:     Physically Abused:     Sexually Abused:        REVIEW OF SYSTEMS     Review of Systems   Gastrointestinal: Positive for diarrhea. Except as noted above the remainder of the review of systems was reviewed and is negative. SCREENINGS                        PHYSICAL EXAM    (up to 7 for level 4, 8 or more for level 5)     ED Triage Vitals [09/08/21 0402]   BP Temp Temp Source Pulse Resp SpO2 Height Weight   119/70 98 °F (36.7 °C) Oral 74 18 99 % 5' 4\" (1.626 m) 240 lb (108.9 kg)       Physical Exam  Constitutional:       General: She is not in acute distress. Appearance: Normal appearance. She is normal weight. She is not ill-appearing. HENT:      Head: Normocephalic and atraumatic. Right Ear: External ear normal.      Left Ear: External ear normal.      Nose: Nose normal. No congestion or rhinorrhea. Mouth/Throat:      Mouth: Mucous membranes are moist.      Pharynx: No oropharyngeal exudate or posterior oropharyngeal erythema. Eyes:      General:         Right eye: No discharge. Left eye: No discharge. Extraocular Movements: Extraocular movements intact. Pupils: Pupils are equal, round, and reactive to light. Cardiovascular:      Rate and Rhythm: Normal rate and regular rhythm. Pulses: Normal pulses. Pulmonary:      Effort: Pulmonary effort is normal.      Breath sounds: Normal breath sounds. Abdominal:      General: Abdomen is flat. Bowel sounds are normal. There is no distension. Tenderness: There is no abdominal tenderness. There is no right CVA tenderness, left CVA tenderness, guarding or rebound. Musculoskeletal:         General: No swelling or tenderness. Normal range of motion. Cervical back: Normal range of motion and neck supple. Right lower leg: No edema. Left lower leg: No edema. Skin:     General: Skin is warm and dry. Capillary Refill: Capillary refill takes less than 2 seconds. Neurological:      General: No focal deficit present. Mental Status: She is alert.    Psychiatric:         Mood and Affect: Mood normal.           DIAGNOSTIC RESULTS     EKG:(none if blank)  All EKGs are interpreted by the Emergency Department Physician who either signs or Co-signs this chart in the absence of a cardiologist.        RADIOLOGY: (none if blank)   I directly visualized the following images and reviewed the radiologist interpretations. Interpretation per the Radiologist below, if available at the time of this note:  No orders to display       LABS:  Labs Reviewed - No data to display    All other labs were within normal range or not returned as of this dictation. Please note, any cultures that may have been sent were not resulted at the time of this patient visit. EMERGENCY DEPARTMENT COURSE and Medical Decision Making:     Vitals:    Vitals:    09/08/21 0402   BP: 119/70   Pulse: 74   Resp: 18   Temp: 98 °F (36.7 °C)   TempSrc: Oral   SpO2: 99%   Weight: 240 lb (108.9 kg)   Height: 5' 4\" (1.626 m)       PROCEDURES: (None if blank)  Procedures       MDM     Educated patient that she is probably having diarrhea from the antibiotics that she is taking. She states that she does not want to take the rest of her antibiotics because of the diarrhea. Explained to her that she can stop the 1 last dose especially since her symptoms of pneumonia have improved. Patient has normal vitals and is tolerating p.o. intake. Will discharge with Imodium. Strict return precautions and follow up instructions were discussed with the patient with which the patient agrees    ED Medications administered this visit:  Medications - No data to display      FINAL IMPRESSION      1.  Diarrhea, unspecified type          DISPOSITION/PLAN   DISPOSITION Decision To Discharge 09/08/2021 05:13:25 AM      PATIENT REFERRED TO:  Twin Salgado  [address] [place] [address],[phone] [id number]  [phone]            DISCHARGE MEDICATIONS:  Discharge Medication List as of 9/8/2021  5:28 AM      START taking these medications    Details   loperamide (RA ANTI-DIARRHEAL) 2 MG capsule Take 1 capsule by mouth 4 times daily as needed for Diarrhea, Disp-40 capsule, R-0Print      ondansetron (ZOFRAN-ODT) 4 MG disintegrating tablet Take 1 tablet by mouth 3 times daily as needed for Nausea or Vomiting, Disp-21 tablet, R-0Print                    Dell Seymour DO (electronically signed)  Attending Physician, Emergency Department         Dell Seymour DO  09/08/21 [id number]

## 2021-09-09 NOTE — TELEPHONE ENCOUNTER
[facility] contacted Nikkie Odonnell (FAN) office to schedule ED folow up appt. SPoke with Maury Huizar, she scheduled appt for Monday 9-13 @1:00pm with Nikkie Odonnell (FAN) at the [facility] location.

## 2021-09-09 NOTE — TELEPHONE ENCOUNTER
Patient requesting medication refill.  Please approve or deny this request.    Rx requested:  Requested Prescriptions     Pending Prescriptions Disp Refills    insulin glargine (LANTUS SOLOSTAR) 100 UNIT/ML injection pen 15 pen 3     Si units at bedtime         Last Office Visit:   2021      Next Visit Date:  Future Appointments   Date Time Provider Asif Daley   2021  2:30 PM [facility] RAD   2021  1:15 PM Mariluz Moore MD [address] [place] [address]   10/28/2021  2:00 PM Garcia Kumari MD  [address]   3/10/2022 11:00 AM Isabelle Arteaga, [address]

## 2021-09-21 PROBLEM — R79.89 ELEVATED TROPONIN: Status: RESOLVED | Noted: 2021-01-01 | Resolved: 2021-01-01

## 2021-09-21 PROBLEM — R77.8 ELEVATED TROPONIN: Status: RESOLVED | Noted: 2021-01-01 | Resolved: 2021-01-01

## 2021-09-22 NOTE — PROGRESS NOTES
9/22/2021    Assessment:       Diagnosis Orders   1. Hypothyroidism, unspecified type  T4, Free    TSH with Reflex   2.  Type 2 diabetes mellitus with hyperglycemia, with long-term current use of insulin (HCC)  POCT Glucose    Hemoglobin Q3G    Basic Metabolic Panel, Fasting         PLAN:     Orders Placed This Encounter   Procedures    T4, Free     Standing Status:   Future     Standing Expiration Date:   9/22/2022    TSH with Reflex     Standing Status:   Future     Standing Expiration Date:   9/22/2022    Hemoglobin A1C     Standing Status:   Future     Standing Expiration Date:   9/22/2022    Basic Metabolic Panel, Fasting     Standing Status:   Future     Standing Expiration Date:   9/22/2022    POCT Glucose     Continue current dose of Humalog Lantus Trulicity Synthroid follow-up in 3 months time patient education history physical examination done with the help of   Orders Placed This Encounter   Medications    Dulaglutide (TRULICITY) 4.23 NM/1.0XN SOPN     Sig: Inject 0.75 mg into the skin once a week     Dispense:  4 pen     Refill:  3    levothyroxine (SYNTHROID) 50 MCG tablet     Sig: take 1 tablet by mouth once daily     Dispense:  30 tablet     Refill:  5    insulin lispro, 1 Unit Dial, (HUMALOG KWIKPEN) 100 UNIT/ML SOPN     Sig: 15  units at each meals     Dispense:  10 pen     Refill:  03    Insulin Pen Needle (NOVOFINE) 32G X 6 MM MISC     Sig: qid     Dispense:  300 each     Refill:  3           Orders Placed This Encounter   Procedures    POCT Glucose       Subjective:     Chief Complaint   Patient presents with    Diabetes    Hypothyroidism     Vitals:    09/22/21 1314   BP: 122/74   Pulse: 72   SpO2: 97%   Weight: 230 lb (104.3 kg)   Height: 5' 4\" (1.626 m)     Wt Readings from Last 3 Encounters:   09/22/21 230 lb (104.3 kg)   09/09/21 242 lb 11.2 oz (110.1 kg)   09/08/21 240 lb (108.9 kg)     BP Readings from Last 3 Encounters:   09/22/21 122/74   09/09/21 (!) 142/65 21 119/70     Follow-up on type 2 diabetes hypothyroidism patient also using freestyle malu continuous glucose monitoring reviewed logs  Patient on Lantus Humalog Trulicity   Lantus 50 at night Humalog 15 with each meals plus Trulicity  Hypothyroidism on Synthroid 50 mcg daily  Blood glucose was high  patient forgot to take insulin   patient using that to adjust insulin has had occasional hypoglycemia hypoglycemia based on p.o. intake activity complications include heart disease history of stroke peripheral artery disease  Hypothyroidism on replacement labs done recently reviewed overall stable requesting refills  Overall blood sugars been averaging between 1 40-1 50 over the last 3 months    Diabetes  She presents for her follow-up diabetic visit. She has type 2 diabetes mellitus. Symptoms are stable. Current diabetic treatment includes insulin injections. She is currently taking insulin pre-breakfast, pre-lunch, pre-dinner and at bedtime. Her overall blood glucose range is 130-140 mg/dl.  (Lab Results       Component                Value               Date                       LABA1C                   7.2 (H)             2021            )     Past Medical History:   Diagnosis Date    Asthma     CAD (coronary artery disease)     CKD (chronic kidney disease) stage 3, GFR 30-59 ml/min ([facility])     Colitis     Diabetes mellitus ([address]     Hyperlipidemia     Hypertension     PAD (peripheral artery disease) ([facility])     Prolonged emergence from general anesthesia     [facility]ipheral vascular [facility]     Thyroid disease      Past Surgical History:   Procedure Laterality Date    CARDIAC SURGERY      CATARACT REMOVAL WITH IMPLANT Bilateral 11- 11-     SECTION      COLONOSCOPY N/A 2019    COLONOSCOPY DIAGNOSTIC performed by Makenzie Ellis MD at [address] [facility]  2019    unknown vessels    HYSTERECTOMY      TOE AMPUTATION Right     3rd toe     Social History     Socioeconomic History    Marital status:      Spouse name: Not on file    Number of children: Not on file    Years of education: Not on file    Highest education level: Not on file   Occupational History    Not on file   Tobacco Use    Smoking status: Never Smoker    Smokeless tobacco: Never Used   Vaping Use    Vaping Use: Never used   Substance and Sexual Activity    Alcohol use: Never    Drug use: Never    Sexual activity: Not on file   Other Topics Concern    Not on file   Social History Narrative         Lives With: Spouse    Type of Home: [address] [place] [address] apt [facility] in [address]: One level    Home Access: Elevator    Bathroom Shower/Tub: Tub/Shower unit(Simultaneous filing. User may not have seen previous data.)    Bathroom Equipment: Grab bars in shower, Shower chair    Home Equipment: Rolling walker, [address] 450 bed    ADL Assistance: Needs assistance    Homemaking Assistance: Needs assistance    Homemaking Responsibilities: No    Ambulation Assistance: Independent    Transfer Assistance: Independent    Active : No    Additional Comments: Pt wears orthopedic shoes at baseline    The patient states she is current with [facility](RN per the ). The patient had a walker, but obtained a hospital bed, wheel chair, BSC and O2 last admission (from [address] [place] [address] Road). pharmacy is [address] [place] [address],[address] [address]., [facility]. [facility] by [facility]	Moe Wise () per the patient     Social Determinants of Health     Financial Resource Strain:     Difficulty of Paying Living Expenses:    Food Insecurity:     Worried About Running Out of Food in the Last Year:     [address] in the Last Year:    Transportation Needs:     Lack of Transportation (Medical):      Lack of Transportation (Non-Medical):    Physical Activity:     Days of Exercise per Week:     Minutes of Exercise per Session:    Stress:     Feeling of Stress :    Social Connections:     Frequency of Communication with Friends and Family:     Frequency of Social Gatherings with Friends and Family:     Attends [employer] Services:     Active Member of Clubs or Organizations:     Attends Club or Organization Meetings:     Marital Status:    Intimate Partner Violence:     Fear of Current or Ex-Partner:     Emotionally Abused:     Physically Abused:     Sexually Abused:      History reviewed. No pertinent family history.   Allergies   Allergen Reactions    Ambien [Zolpidem Tartrate]     Capoten [Captopril]     Clioquinol     Cogentin [Benztropine]     Depakote [Divalproex Sodium]     Effexor Xr [Venlafaxine Hcl Er]     Geodon [Ziprasidone Hcl]     Lisinopril      Hyperkalemia: 4/21/20 potassium was 6.7    Lyrica [Pregabalin]     Navane [Thiothixene]     Pamelor [Nortriptyline Hcl]     Remeron [Mirtazapine]     Risperdal [Risperidone]     Trazodone And Nefazodone     Wellbutrin [Bupropion]        Current Outpatient Medications:     ammonium lactate (LAC-HYDRIN) 12 % lotion, , Disp: , Rfl:     atorvastatin (LIPITOR) 80 MG tablet, take 1 tablet by mouth at bedtime, Disp: , Rfl:     diclofenac sodium (VOLTAREN) 1 % GEL, apply 4 grams to affected area three times a day AS NEEDED FOR PAIN, Disp: , Rfl:     doxepin (SINEQUAN) 25 MG capsule, , Disp: , Rfl:     insulin glargine (LANTUS SOLOSTAR) 100 UNIT/ML injection pen, 50 units at bedtime, Disp: 15 pen, Rfl: 3    amoxicillin-clavulanate (AUGMENTIN) 875-125 MG per tablet, Take 1 tablet by mouth 2 times daily , Disp: , Rfl:     ondansetron (ZOFRAN-ODT) 4 MG disintegrating tablet, Take 1 tablet by mouth 3 times daily as needed for Nausea or Vomiting, Disp: 21 tablet, Rfl: 0    ranolazine (RANEXA) 1000 MG extended release tablet, Take 1 tablet by mouth 2 times daily, Disp: 60 tablet, Rfl: 3    gabapentin (NEURONTIN) 600 MG tablet, Take 600 mg by mouth 2 times daily. , Disp: , Rfl:     insulin lispro, 1 Unit Dial, (HUMALOG KWIKPEN) 100 UNIT/ML SOPN, 15  units at each meals, Disp: 10 pen, Rfl: 03    albuterol sulfate HFA (VENTOLIN HFA) 108 (90 Base) MCG/ACT inhaler, Inhale 2 puffs into the lungs as needed for Wheezing Every 4-6 hours PRN, Disp: 1 Inhaler, Rfl: 5    montelukast (SINGULAIR) 10 MG tablet, Take 1 tablet by mouth nightly, Disp: 30 tablet, Rfl: 5    Continuous Blood Gluc Sensor (FREESTYLE FELY 14 DAY SENSOR) MISC, Every 2 weeks, Disp: 2 each, Rfl: 06    Dulaglutide (TRULICITY) 7.33 ES/7.6WX SOPN, Inject 0.75 mg into the skin once a week, Disp: 4 pen, Rfl: 3    Insulin Pen Needle ([employer] PEN NEEDLES 31G) 31G X 8 MM MISC, 1 each by Does not apply route 4 times daily, Disp: 300 each, Rfl: 3    levothyroxine (SYNTHROID) 50 MCG tablet, take 1 tablet by mouth once daily, Disp: 30 tablet, Rfl: 5    Alcohol Swabs (ALCOHOL PREP) 70 % PADS, qid, Disp: 300 each, Rfl: 06    Insulin Pen Needle (NOVOFINE) 32G X 6 MM MISC, qid, Disp: 300 each, Rfl: 3    oxybutynin (DITROPAN-XL) 5 MG extended release tablet, take 1 tablet by mouth once daily, Disp: 90 tablet, Rfl: 3    RESTASIS 0.05 % ophthalmic emulsion, , Disp: , Rfl:     neomycin-polymyxin-dexameth (MAXITROL) 3.5-14374-0.1 ophthalmic suspension, instill 1 drop into both eyes four times a day, Disp: , Rfl:     ARTIFICIAL TEARS 1.4 % ophthalmic solution, , Disp: , Rfl:     docusate sodium (COLACE, DULCOLAX) 100 MG CAPS, Take 100 mg by mouth 2 times daily, Disp: 60 capsule, Rfl: 1    prazosin (MINIPRESS) 2 MG capsule, Take 2 mg by mouth nightly , Disp: , Rfl:     sertraline (ZOLOFT) 100 MG tablet, Take 200 mg by mouth daily , Disp: , Rfl:     Continuous Blood Gluc Sensor (FREESTYLE FELY 14 DAY SENSOR) MISC, Every 2 weeks, Disp: 2 each, Rfl: 06    Continuous Blood Gluc  (FREESTYLE FELY 14 DAY READER) CATHLEEN, As directed, Disp: 1 Device, Rfl: 00    furosemide (LASIX) 40 MG tablet, Take 1 tablet by mouth daily, Disp: 60 tablet, Rfl: 3    ticagrelor (BRILINTA) 90 MG TABS tablet, Take 90 mg by mouth 2 times daily, Disp: , Rfl:     CPAP Machine MISC, by Does not apply route New CPAP with 10 cm, Disp: 1 each, Rfl: 0    aspirin 81 MG EC tablet, Take 1 tablet by mouth daily, Disp: 30 tablet, Rfl: 3    OXYGEN, 2 lit O2 with sleep , please give O2 concentrator, Disp: 1 Units, Rfl: 0    Emollient (EUCERIN INTENSIVE REPAIR HAND) 2.5-10 % CREA, APPLY TO FEET AT BEDTIME; PLACE SOCKS OVER FEET AFTER APPLICATION IF NEEDED FOR DRY CRACKING FEET, Disp: , Rfl:     hydrOXYzine (VISTARIL) 25 MG capsule, Take 50 mg by mouth 3 times daily as needed , Disp: , Rfl:     Nutritional Supplements (GLUCERNA SHAKE) LIQD, take as directed three times a day, Disp: , Rfl:     isosorbide dinitrate (ISORDIL) 20 MG tablet, Take 1 tablet by mouth 3 times daily, Disp: 90 tablet, Rfl: 3    nitroGLYCERIN (NITROSTAT) 0.4 MG SL tablet, up to max of 3 total doses. If no relief after 1 dose, call 911., Disp: 25 tablet, Rfl: 3    hydrALAZINE (APRESOLINE) 50 MG tablet, Take 1 tablet by mouth every 8 hours, Disp: 90 tablet, Rfl: 3    metoprolol succinate (TOPROL XL) 50 MG extended release tablet, Take 1 tablet by mouth 2 times daily, Disp: 30 tablet, Rfl: 3    amitriptyline (ELAVIL) 25 MG tablet, Take 25 mg by mouth nightly, Disp: , Rfl:     gabapentin (NEURONTIN) 400 MG capsule, Take 400 mg by mouth 2 times daily. AM and 800 mg in pm-9pm, Disp: , Rfl:     haloperidol (HALDOL) 5 MG tablet, Take 5 mg by mouth daily, Disp: , Rfl:     FreeStyle Lancets MISC, 1 each by Does not apply route daily, Disp: 100 each, Rfl: 3    blood glucose test strips (FREESTYLE LITE) strip, 1 each by In Vitro route daily As needed. , Disp: 100 each, Rfl: 3    folic acid (FOLVITE) 1 MG tablet, Take 1 mg by mouth daily, Disp: , Rfl:     Iron Polysacch Zijej-J21-CA (NIFEREX-150 FORTE PO), Take 1 tablet by mouth daily , Disp: , Rfl:     Cyanocobalamin (VITAMIN B-12) 1000 MCG extended release tablet, Take 1,000 mcg by mouth daily, Disp: , Rfl:     meclizine (ANTIVERT) 25 MG tablet, Take 25 mg by mouth three times daily, Disp: , Rfl:     albuterol (PROVENTIL) (2.5 MG/3ML) 0.083% nebulizer solution, Take 3 mLs by nebulization every 6 hours as needed for Wheezing, Disp: 120 each, Rfl: 5  Lab Results   Component Value Date     (L) 09/09/2021    K 4.6 09/09/2021     09/09/2021    CO2 23 09/09/2021    BUN 10 09/09/2021    CREATININE 1.01 (H) 09/09/2021    GLUCOSE 311 09/22/2021    CALCIUM 9.2 09/09/2021    PROT 6.5 08/26/2021    LABALBU 3.3 (L) 08/26/2021    BILITOT 0.6 08/26/2021    ALKPHOS 81 08/26/2021    AST 21 08/26/2021    ALT 16 08/26/2021    LABGLOM 55.2 (L) 09/09/2021    GFRAA >60.0 09/09/2021    GLOB 3.2 08/26/2021     Lab Results   Component Value Date    WBC 7.4 09/09/2021    HGB 11.5 (L) 09/09/2021    HCT 33.2 (L) 09/09/2021    MCV 76.9 (L) 09/09/2021     09/09/2021     Lab Results   Component Value Date    LABA1C 7.2 (H) 09/09/2021    LABA1C 7.5 (H) 07/01/2021    LABA1C 7.1 (H) 06/08/2021     Lab Results   Component Value Date    HDL 40 08/26/2021    HDL 42 08/25/2021    HDL 43 08/24/2021    LDLCALC 36 08/26/2021    LDLCALC 34 08/25/2021    LDLCALC 44 08/24/2021    CHOL 88 08/26/2021    CHOL 86 08/25/2021    CHOL 102 08/24/2021    TRIG 60 08/26/2021    TRIG 50 08/25/2021    TRIG 76 08/24/2021     No results found for: TESTM  Lab Results   Component Value Date    TSH 1.470 07/01/2021    TSH 1.390 04/06/2021    TSH 1.420 12/01/2020    TSHREFLEX 1.970 09/09/2021    TSHREFLEX 1.870 06/08/2021    TSHREFLEX 1.680 03/09/2021    T4FREE 1.71 (H) 09/09/2021    T4FREE 1.44 06/08/2021    T4FREE 1.57 03/09/2021     No results found for: TPOABS    Review of Systems   Eyes: Negative. Cardiovascular: Negative. Endocrine: Negative. Neurological: Negative. All other systems reviewed and are negative.       Objective:   Physical Exam  Vitals reviewed. Constitutional:       Appearance: Normal appearance. She is obese. HENT:      Head: Normocephalic and atraumatic. Hair is normal.      Right Ear: External ear normal.      Left Ear: External ear normal.      Nose: Nose normal.   Eyes:      General: No scleral icterus. Right eye: No discharge. Left eye: No discharge. Extraocular Movements: Extraocular movements intact. Conjunctiva/sclera: Conjunctivae normal.   Neck:      Trachea: Trachea normal.   Cardiovascular:      Rate and Rhythm: Normal rate. Musculoskeletal:         General: Normal range of motion. Cervical back: Normal range of motion and neck supple. Neurological:      General: No focal deficit present. Mental Status: She is alert. Psychiatric:         Mood and Affect: Mood is anxious.          Behavior: Behavior normal.

## 2021-10-23 NOTE — PLAN OF CARE
Ongoing. FAMILY HISTORY:  Sibling  Still living? Unknown  Family history of hypertension, Age at diagnosis: Age Unknown

## 2021-10-28 NOTE — PROGRESS NOTES
Emir Hollingsworth. Transportation is provided by [employer] () per the patient     Social Determinants of Health     Financial Resource Strain:     Difficulty of Paying Living Expenses:    Food Insecurity:     Worried About Running Out of Food in the Last Year:     [address] in the Last Year:    Transportation Needs:     Lack of Transportation (Medical):  Lack of Transportation (Non-Medical):    Physical Activity:     Days of Exercise per Week:     Minutes of Exercise per Session:    Stress:     Feeling of Stress :    Social Connections:     Frequency of Communication with Friends and Family:     Frequency of Social Gatherings with Friends and Family:     Attends [employer] orth[employer] Services:     Active Member of Clubs or Organizations:     Attends Club or Organization Meetings:     Marital Status:    Intimate Partner Violence:     Fear of Current or Ex-Partner:     Emotionally Abused:     Physically Abused:     Sexually Abused:          Review of Systems   Constitutional: Negative for chills, diaphoresis, fatigue and fever. HENT: Negative for congestion, mouth sores, nosebleeds, postnasal drip, rhinorrhea, sinus pressure, sneezing, sore throat, trouble swallowing and voice change. Eyes: Negative for discharge, itching and visual disturbance. Respiratory: Positive for cough and shortness of breath. Negative for chest tightness and wheezing. Cardiovascular: Negative for chest pain, palpitations and leg swelling. Gastrointestinal: Negative for abdominal pain, diarrhea, nausea and vomiting. Genitourinary: Negative for difficulty urinating and hematuria. Musculoskeletal: Negative for arthralgias, joint swelling and myalgias. Skin: Negative for rash. Allergic/Immunologic: Negative for environmental allergies and food allergies. Neurological: Negative for dizziness, tremors, weakness and headaches. Psychiatric/Behavioral: Positive for sleep disturbance. Negative for behavioral problems. :     Vitals:    10/28/21 1347   BP: 139/88   Pulse: 76   Temp: 98.2 °F (36.8 °C)   SpO2: 99%   Weight: 232 lb (105.2 kg)   Height: 5' 4\" (1.626 m)     Wt Readings from Last 3 Encounters:   10/28/21 232 lb (105.2 kg)   09/22/21 230 lb (104.3 kg)   09/09/21 242 lb 11.2 oz (110.1 kg)         Physical Exam  Constitutional:       General: She is not in acute distress. Appearance: She is well-developed. She is obese. She is not diaphoretic. HENT:      Head: Normocephalic and atraumatic. Nose: Nose normal.   Eyes:      Pupils: Pupils are equal, round, and reactive to light. Neck:      Thyroid: No thyromegaly. Vascular: No JVD. Trachea: No tracheal deviation. Cardiovascular:      Rate and Rhythm: Normal rate and regular rhythm. Heart sounds: No murmur heard. No friction rub. No gallop. Pulmonary:      Effort: No respiratory distress. Breath sounds: No wheezing or rales. Chest:      Chest wall: No tenderness. Abdominal:      General: There is no distension. Tenderness: There is no abdominal tenderness. There is no rebound. Musculoskeletal:         General: Normal range of motion. Lymphadenopathy:      Cervical: No cervical adenopathy. Skin:     General: Skin is warm and dry. Neurological:      Mental Status: She is alert and oriented to person, place, and time.       Coordination: Coordination normal.         Current Outpatient Medications   Medication Sig Dispense Refill    Dulaglutide (TRULICITY) 3.58 LS/7.5HX SOPN Inject 0.75 mg into the skin once a week 4 pen 3    levothyroxine (SYNTHROID) 50 MCG tablet take 1 tablet by mouth once daily 30 tablet 5    insulin lispro, 1 Unit Dial, (HUMALOG KWIKPEN) 100 UNIT/ML SOPN 15  units at each meals 10 pen 03    Insulin Pen Needle (NOVOFINE) 32G X 6 MM MISC qid 300 each 3    ammonium lactate (LAC-HYDRIN) 12 % lotion       atorvastatin (LIPITOR) 80 MG tablet take 1 tablet by mouth at bedtime      diclofenac sodium (VOLTAREN) 1 % GEL apply 4 grams to affected area three times a day AS NEEDED FOR PAIN      doxepin (SINEQUAN) 25 MG capsule       insulin glargine (LANTUS SOLOSTAR) 100 UNIT/ML injection pen 50 units at bedtime 15 pen 3    amoxicillin-clavulanate (AUGMENTIN) 875-125 MG per tablet Take 1 tablet by mouth 2 times daily       ondansetron (ZOFRAN-ODT) 4 MG disintegrating tablet Take 1 tablet by mouth 3 times daily as needed for Nausea or Vomiting 21 tablet 0    ranolazine (RANEXA) 1000 MG extended release tablet Take 1 tablet by mouth 2 times daily 60 tablet 3    gabapentin (NEURONTIN) 600 MG tablet Take 600 mg by mouth 2 times daily.       albuterol sulfate HFA (VENTOLIN HFA) 108 (90 Base) MCG/ACT inhaler Inhale 2 puffs into the lungs as needed for Wheezing Every 4-6 hours PRN 1 Inhaler 5    montelukast (SINGULAIR) 10 MG tablet Take 1 tablet by mouth nightly 30 tablet 5    Continuous Blood Gluc Sensor (FREESTYLE FELY 14 DAY SENSOR) MISC Every 2 weeks 2 each 06    Insulin Pen Needle ([employer] PEN NEEDLES 31G) 31G X 8 MM MISC 1 each by Does not apply route 4 times daily 300 each 3    Alcohol Swabs (ALCOHOL PREP) 70 % PADS qid 300 each 06    oxybutynin (DITROPAN-XL) 5 MG extended release tablet take 1 tablet by mouth once daily 90 tablet 3    RESTASIS 0.05 % ophthalmic emulsion       neomycin-polymyxin-dexameth (MAXITROL) 3.5-64296-8.1 ophthalmic suspension instill 1 drop into both eyes four times a day      ARTIFICIAL TEARS 1.4 % ophthalmic solution       docusate sodium (COLACE, DULCOLAX) 100 MG CAPS Take 100 mg by mouth 2 times daily 60 capsule 1    prazosin (MINIPRESS) 2 MG capsule Take 2 mg by mouth nightly       sertraline (ZOLOFT) 100 MG tablet Take 200 mg by mouth daily       Continuous Blood Gluc Sensor (FREESTYLE FELY 14 DAY SENSOR) MISC Every 2 weeks 2 each 06    Continuous Blood Gluc  (FREESTYLE FELY 14 DAY READER) CATHLEEN As directed 1 Device 00    furosemide (LASIX) 40 MG tablet Take 1 tablet by mouth daily 60 tablet 3    ticagrelor (BRILINTA) 90 MG TABS tablet Take 90 mg by mouth 2 times daily      CPAP Machine MISC by Does not apply route New CPAP with 10 cm 1 each 0    aspirin 81 MG EC tablet Take 1 tablet by mouth daily 30 tablet 3    OXYGEN 2 lit O2 with sleep , please give O2 concentrator 1 Units 0    Emollient (EUCERIN INTENSIVE REPAIR HAND) 2.5-10 % CREA APPLY TO FEET AT BEDTIME; PLACE SOCKS OVER FEET AFTER APPLICATION IF NEEDED FOR DRY CRACKING FEET      hydrOXYzine (VISTARIL) 25 MG capsule Take 50 mg by mouth 3 times daily as needed       Nutritional Supplements (GLUCERNA SHAKE) LIQD take as directed three times a day      isosorbide dinitrate (ISORDIL) 20 MG tablet Take 1 tablet by mouth 3 times daily 90 tablet 3    nitroGLYCERIN (NITROSTAT) 0.4 MG SL tablet up to max of 3 total doses. If no relief after 1 dose, call 911. 25 tablet 3    hydrALAZINE (APRESOLINE) 50 MG tablet Take 1 tablet by mouth every 8 hours 90 tablet 3    metoprolol succinate (TOPROL XL) 50 MG extended release tablet Take 1 tablet by mouth 2 times daily 30 tablet 3    amitriptyline (ELAVIL) 25 MG tablet Take 25 mg by mouth nightly      gabapentin (NEURONTIN) 400 MG capsule Take 400 mg by mouth 2 times daily. AM and 800 mg in pm-9pm      haloperidol (HALDOL) 5 MG tablet Take 5 mg by mouth daily      FreeStyle Lancets MISC 1 each by Does not apply route daily 100 each 3    blood glucose test strips (FREESTYLE LITE) strip 1 each by In Vitro route daily As needed.  830 each 3    folic acid (FOLVITE) 1 MG tablet Take 1 mg by mouth daily      Iron Polysacch [id number] (NIFEREX-150 FORTE PO) Take 1 tablet by mouth daily       Cyanocobalamin (VITAMIN B-12) 1000 MCG extended release tablet Take 1,000 mcg by mouth daily      meclizine (ANTIVERT) 25 MG tablet Take 25 mg by mouth three times daily      albuterol (PROVENTIL) (2.5 MG/3ML) 0.083% nebulizer solution Take 3 mLs by nebulization every 6 hours as needed for Wheezing 120 each 5     No current facility-administered medications for this visit. Results for orders placed during the hospital encounter of 20    XR CHEST STANDARD (2 VW)    Narrative  XR CHEST (2 VW) : 2020    CLINICAL HISTORY:  SOB .    COMPARISON: 2020. TECHNIQUE: Upright AP and lateral radiographs of the chest were obtained. FINDINGS:    A shallow inspiration is present without significant infiltrate identified. The heart remains mildly enlarged with postoperative changes from previous CABG. There is no significant pleural effusion, vascular congestion, pneumothorax, or displaced fractures identified. Impression  NO EVIDENCE OF ACTIVE CARDIOPULMONARY DISEASE. Results for orders placed during the hospital encounter of 20    XR CHEST STANDARD (2 VW)    Narrative  Patient MRN: [id number]    : 1955    Age:  60 years    Gender: Female    Order Date: 2020 7:00 AM.    Exam: XR CHEST (2 VW)    Number of Views: 2    Indication:  Shortness of breath and pulmonary hypertension. 81-year-old female presenting to emergency department with shortness of breath times several months, fall/syncope at home and evaluation of mental status changes    Comparison: 2020    Findings: Stable, enlarged cardiomediastinal silhouette with underlying pulmonary edema and nonspecific bibasilar airspace disease. Median sternotomy wires. Vascular calcifications thoracic aorta. No pneumothorax. Impression  Impression:  1. Stable, enlarged cardiac mediastinal silhouette with underlying pulmonary edema  10. . Nonspecific bibasilar airspace disease, findings can be seen in infiltrate/pneumonia and/or atelectasis. [facility]       Results for orders placed during the hospital encounter of 20    XR CHEST STANDARD (2 VW)    Narrative  Patient MRN: [id number]    : 1955    Age:  60 years    Gender: Female    Order Date: 2020 11:45 AM.    Exam: XR CHEST (2 VW)    Number of Views: 3    Indication:  Chest pain x4 days. Comparison: None    Findings: Median sternotomy wires. Cardiomediastinal silhouette is within normal limits. Lungs are clear without evidence of infiltrate/pneumonia, pneumothorax or pleural effusion    Impression  No radiographic evidence of acute cardiopulmonary disease  ]  Results for orders placed during the hospital encounter of 08/21/21    XR CHEST PORTABLE    Narrative  EXAMINATION: CHEST PORTABLE VIEW    CLINICAL HISTORY: Midsternal chest pain    COMPARISONS: April 6, 2021 0143 hours    FINDINGS:    Single  views of the chest is submitted. There are multiple median sternotomy wires. The cardiac silhouette is enlarged  Pulmonary vascular unremarkable. Right sided trachea. Atelectasis, patchy infiltrate right lower lobe. No Pneumothoraces. Impression  ATELECTASIS, PATCHY INFILTRATE RIGHT LOWER LOBE. Results for orders placed during the hospital encounter of 04/06/21    XR CHEST PORTABLE    Narrative  EXAMINATION: XR CHEST PORTABLE    CLINICAL HISTORY: CHEST    COMPARISONS: SEPTEMBER 29, 2020    FINDINGS: Median sternotomy. Cardiopericardial silhouette enlarged and unchanged. Left cardiac margin has downward outward prominence, unchanged. Lungs clear. Impression  STABLE CARDIOMEGALY WITH RADIOGRAPHIC SIGNS OF LEFT VENTRICULAR ENLARGEMENT, UNCHANGED. Results for orders placed during the hospital encounter of 09/29/20    XR CHEST PORTABLE    Narrative  Exam: XR CHEST PORTABLE    History:  Chest pain    Technique: AP portable view of the chest obtained. Comparison: Chest x-ray from June 29, 2020    Findings: There is no change in the median sternotomy. The cardiac silhouette is at the upper limits of normal in size. There are no infiltrates, consolidations or effusions. Bones of the thorax appear intact.     Impression  No radiographic evidence of acute intrathoracic process.  ]  Results for orders placed during the hospital encounter of 09/27/19    XR CHEST (SINGLE VIEW FRONTAL)    Narrative  EXAMINATION: XR CHEST (SINGLE VIEW FRONTAL)    CLINICAL HISTORY: Z91.89 At risk for ineffective breathing ICD10    COMPARISONS: None available. FINDINGS: Single AP portable view the chest obtained on September 27, 2019 at 1715 hours. There is mild cardiomegaly without vascular congestion pulmonary edema or pleural effusion. The mediastinum is not widened or shifted. The calcified aorta is not  dilated. Sternotomy sutures are present. The chest wall is unremarkable. CONCLUSION: NO ACUTE PROCESS      Assessment/Plan:     1. JESICA (obstructive sleep apnea)  She is using CPAP with  10  centimeters of H2O with heated humidity and 3 lit O2 bled. She is using CPAP for about   4-5  hours every night. She is using CPAP with   Full face Mask. She said  sleep is restful with the CPAP use. She is compliant with CPAP therapy and benefiting with CPAP use. No snoring with CPAP use. I reviewed compliance report with patient regarding CPAP therapy. She is using  CPAP for 30 days out of 30 days  Average usage of days used is 5 hours and 40 min , average AHI 27.6with CPAP use. She has central  Apnea, she has cheyne stroke  Breathing. She has h/o CVA. May consider ASV study in future    Counseling: CPAP/BiPAP uses, She advised to use CPAP at least 5-6 hours every night. Sleep hygiene:Avoid supine sleep, sleep on  sides. Avoid  sleep deprivation. Explained sleep hygiene. Advice to avoid Alcohol and sedative    2. Pneumonia of right lower lobe due to infectious organism  She was in hospital for pneumonia 8/21/21 she was treated with antibiotics. She said she is  Better but she is more shortness of breath since she has pneumonia. She said she  Has cough with sometimes dark and sometimes clear [place]. No fever, no chest pain. No f/u CXR done , overall feeling better. Chest x-ray requested     - XR CHEST STANDARD (2 VW); Future    3. Mild intermittent asthma without complication  She is on 3 lit O2  24 hour a day. She is on albuterol nebulizer and albuterol HFA prn. Continue O2 and bronchodilator therapy as before    4. Obesity (BMI 30-39. 9)  She is advised try to lose weight. obesity related risk explained to the patient ,  Current weight:  232 lb (105.2 kg) Lbs. BMI:  Body mass index is 39.82 kg/m². Suggested weight control approaches, including dietary changes , exercise, behavioral modification. Time spent over 40 min  Return in about 6 weeks (around 12/9/2021) for asthma, vincent, hypoxia on O2.       Qamar Borjas MD

## 2021-11-13 PROBLEM — I50.9 ACUTE DECOMPENSATED HEART FAILURE (HCC): Status: ACTIVE | Noted: 2021-01-01

## 2021-11-14 NOTE — ED PROVIDER NOTES
[facility] ENCOUNTER      Pt Name: Dunia Smoker  MRN: [id number]  Medinagflisa 1955  Date of evaluation: 11/13/2021  Provider: Faraz Gee Dr       Chief Complaint   Patient presents with    Chest Pain         HISTORY OF PRESENT ILLNESS   (Location/Symptom, Timing/Onset, Context/Setting, Quality, Duration, Modifying Factors, Severity)  Note limiting factors. Dunia Smoker is a 61 y.o. female who presents to the emergency department complaints of chest pain and shortness of breath that began at lunchtime today. Patient describes it as a tight feeling in the left side of her chest with associated difficulty in breathing. Patient does state that she has had a recent cough and chest congestion, was dx with pneumonia on abx. Patient denies any vomiting or diarrhea. Patient states some lightheadedness and dizziness but denies any significant headaches. Patient has been vaccinated for Covid and denies any recent fevers or chills    HPI    Nursing Notes were reviewed. REVIEW OF SYSTEMS    (2-9 systems for level 4, 10 or more for level 5)     Review of Systems   Constitutional: Negative for diaphoresis and fever. HENT: Negative for hearing loss and trouble swallowing. Eyes: Negative for pain. Respiratory: Positive for cough, chest tightness and shortness of breath. Negative for apnea. Cardiovascular: Positive for chest pain. Gastrointestinal: Negative for abdominal pain. Endocrine: Negative. Genitourinary: Negative for hematuria. Musculoskeletal: Negative for neck pain and neck stiffness. Skin: Negative for color change. Allergic/Immunologic: Negative. Neurological: Negative for dizziness and numbness. Hematological: Negative. Psychiatric/Behavioral: The patient is nervous/anxious. All other systems reviewed and are negative.       Except as noted above the remainder of the review of systems was reviewed and negative. PAST MEDICAL HISTORY     Past Medical History:   Diagnosis Date    Asthma     CAD (coronary artery disease)     CKD (chronic kidney disease) stage 3, GFR 30-59 ml/min ([facility])     Colitis     Diabetes mellitus ([facility]     Hyperlipidemia     Hypertension     PAD (peripheral artery disease) ([facility])     Prolonged emergence from general anesthesia     PVD (peripheral vascular disease) ([facility]     Thyroid disease          SURGICAL HISTORY       Past Surgical History:   Procedure Laterality Date    CARDIAC SURGERY      CATARACT REMOVAL WITH IMPLANT Bilateral 11- 11-     SECTION      COLONOSCOPY N/A 2019    COLONOSCOPY DIAGNOSTIC performed by Rakesh Perkins MD at [address] [place] [address] GRAFT  2019    unknown vessels    HYSTERECTOMY      TOE AMPUTATION Right     3rd toe         CURRENT MEDICATIONS       Current Discharge Medication List      CONTINUE these medications which have NOT CHANGED    Details   Dulaglutide (TRULICITY) 0.38 MD/2.5PI SOPN Inject 0.75 mg into the skin once a week  Qty: 4 pen, Refills: 3      levothyroxine (SYNTHROID) 50 MCG tablet take 1 tablet by mouth once daily  Qty: 30 tablet, Refills: 5      insulin lispro, 1 Unit Dial, (HUMALOG KWIKPEN) 100 UNIT/ML SOPN 15  units at each meals  Qty: 10 pen, Refills: 03      !!  Insulin Pen Needle (NOVOFINE) 32G X 6 MM MISC qid  Qty: 300 each, Refills: 3      ammonium lactate (LAC-HYDRIN) 12 % lotion       atorvastatin (LIPITOR) 80 MG tablet take 1 tablet by mouth at bedtime      diclofenac sodium (VOLTAREN) 1 % GEL apply 4 grams to affected area three times a day AS NEEDED FOR PAIN      doxepin (SINEQUAN) 25 MG capsule       insulin glargine (LANTUS SOLOSTAR) 100 UNIT/ML injection pen 50 units at bedtime  Qty: 15 pen, Refills: 3      amoxicillin-clavulanate (AUGMENTIN) 875-125 MG per tablet Take 1 tablet by mouth 2 times daily       ondansetron (ZOFRAN-ODT) 4 MG disintegrating tablet Take 1 tablet by mouth 3 times daily as needed for Nausea or Vomiting  Qty: 21 tablet, Refills: 0      ranolazine (RANEXA) 1000 MG extended release tablet Take 1 tablet by mouth 2 times daily  Qty: 60 tablet, Refills: 3      gabapentin (NEURONTIN) 600 MG tablet Take 600 mg by mouth 2 times daily. albuterol sulfate HFA (VENTOLIN HFA) 108 (90 Base) MCG/ACT inhaler Inhale 2 puffs into the lungs as needed for Wheezing Every 4-6 hours PRN  Qty: 1 Inhaler, Refills: 5    Associated Diagnoses: Mild intermittent asthma without complication      albuterol (PROVENTIL) (2.5 MG/3ML) 0.083% nebulizer solution Take 3 mLs by nebulization every 6 hours as needed for Wheezing  Qty: 120 each, Refills: 5    Associated Diagnoses: Mild intermittent asthma without complication      montelukast (SINGULAIR) 10 MG tablet Take 1 tablet by mouth nightly  Qty: 30 tablet, Refills: 5    Associated Diagnoses: Mild intermittent asthma without complication      !! Continuous Blood Gluc Sensor (FREESTYLE FELY 14 DAY SENSOR) MISC Every 2 weeks  Qty: 2 each, Refills: 06      !! Insulin Pen Needle ([employer] PEN NEEDLES 31G) 31G X 8 MM MISC 1 each by Does not apply route 4 times daily  Qty: 300 each, Refills: 3      Alcohol Swabs (ALCOHOL PREP) 70 % PADS qid  Qty: 300 each, Refills: 06      oxybutynin (DITROPAN-XL) 5 MG extended release tablet take 1 tablet by mouth once daily  Qty: 90 tablet, Refills: 3      RESTASIS 0.05 % ophthalmic emulsion       neomycin-polymyxin-dexameth (MAXITROL) 3.5-75479-3.1 ophthalmic suspension instill 1 drop into both eyes four times a day      ARTIFICIAL TEARS 1.4 % ophthalmic solution       docusate sodium (COLACE, DULCOLAX) 100 MG CAPS Take 100 mg by mouth 2 times daily  Qty: 60 capsule, Refills: 1      prazosin (MINIPRESS) 2 MG capsule Take 2 mg by mouth nightly       sertraline (ZOLOFT) 100 MG tablet Take 200 mg by mouth daily       !!  Continuous Blood Gluc Sensor (FREESTYLE FELY 14 DAY SENSOR) MISC Every 2 weeks  Qty: 2 each, Refills: 06      Continuous Blood Gluc  (FREESTYLE FELY 14 DAY READER) CATHLEEN As directed  Qty: 1 Device, Refills: 00      furosemide (LASIX) 40 MG tablet Take 1 tablet by mouth daily  Qty: 60 tablet, Refills: 3      ticagrelor (BRILINTA) 90 MG TABS tablet Take 90 mg by mouth 2 times daily      CPAP Machine MISC by Does not apply route New CPAP with 10 cm  Qty: 1 each, Refills: 0    Associated Diagnoses: JESICA (obstructive sleep apnea)      aspirin 81 MG EC tablet Take 1 tablet by mouth daily  Qty: 30 tablet, Refills: 3      OXYGEN 2 lit O2 with sleep , please give O2 concentrator  Qty: 1 Units, Refills: 0      Emollient (EUCERIN INTENSIVE REPAIR HAND) 2.5-10 % CREA APPLY TO FEET AT BEDTIME; PLACE SOCKS OVER FEET AFTER APPLICATION IF NEEDED FOR DRY CRACKING FEET      hydrOXYzine (VISTARIL) 25 MG capsule Take 50 mg by mouth 3 times daily as needed       Nutritional Supplements (GLUCERNA SHAKE) LIQD take as directed three times a day      isosorbide dinitrate (ISORDIL) 20 MG tablet Take 1 tablet by mouth 3 times daily  Qty: 90 tablet, Refills: 3      nitroGLYCERIN (NITROSTAT) 0.4 MG SL tablet up to max of 3 total doses. If no relief after 1 dose, call 911. Qty: 25 tablet, Refills: 3      hydrALAZINE (APRESOLINE) 50 MG tablet Take 1 tablet by mouth every 8 hours  Qty: 90 tablet, Refills: 3      metoprolol succinate (TOPROL XL) 50 MG extended release tablet Take 1 tablet by mouth 2 times daily  Qty: 30 tablet, Refills: 3      amitriptyline (ELAVIL) 25 MG tablet Take 25 mg by mouth nightly      gabapentin (NEURONTIN) 400 MG capsule Take 400 mg by mouth 2 times daily. AM and 800 mg in pm-9pm      haloperidol (HALDOL) 5 MG tablet Take 5 mg by mouth daily      FreeStyle Lancets MISC 1 each by Does not apply route daily  Qty: 100 each, Refills: 3      blood glucose test strips (FREESTYLE LITE) strip 1 each by In Vitro route daily As needed.   Qty: 100 each, Refills: 3      folic acid (FOLVITE) 1 MG tablet Take 1 mg by mouth daily      Iron Polysacch [id number]-K54-WB (NIFEREX-150 FORTE PO) Take 1 tablet by mouth daily       Cyanocobalamin (VITAMIN B-12) 1000 MCG extended release tablet Take 1,000 mcg by mouth daily      meclizine (ANTIVERT) 25 MG tablet Take 25 mg by mouth three times daily       ! ! - Potential duplicate medications found. Please discuss with provider. ALLERGIES     Ambien [zolpidem tartrate], Capoten [captopril], Clioquinol, Cogentin [benztropine], Depakote [divalproex sodium], Effexor xr [venlafaxine hcl er], Geodon [ziprasidone hcl], Lisinopril, Lyrica [pregabalin], Navane [thiothixene], Pamelor [nortriptyline hcl], Remeron [mirtazapine], Risperdal [risperidone], Trazodone and nefazodone, and Wellbutrin [bupropion]    FAMILY HISTORY     No family history on file. SOCIAL HISTORY       Social History     Socioeconomic History    Marital status:      Spouse name: Not on file    Number of children: Not on file    Years of education: Not on file    Highest education level: Not on file   Occupational History    Not on file   Tobacco Use    Smoking status: Never Smoker    Smokeless tobacco: Never Used   Vaping Use    Vaping Use: Never used   Substance and Sexual Activity    Alcohol use: Never    Drug use: Never    Sexual activity: Not on file   Other Topics Concern    Not on file   Social History Narrative         Lives With: Spouse    Type of Home: [address] [place] [address] in [address]: One level    Home Access: Elevator    Bathroom Shower/Tub: Tub/Shower unit(Simultaneous filing.  User may not have seen previous data.)    Bathroom Equipment: Grab bars in shower, Shower chair    Home Equipment: Rolling walker, Amaryllis Prayer bed    ADL Assistance: Needs assistance    Homemaking Assistance: Needs assistance    Homemaking Responsibilities: No    Ambulation Assistance: Independent    Transfer Assistance: Independent    Active : No    Additional Comments: Pt wears orthopedic shoes at baseline    The patient states she is current with [facility](RN per the ). The patient had a walker, but obtained a hospital bed, wheel chair, BSC and O2 last admission (from [address]). pharmacy is [address] [place] [address],[address] [address] [facility]. Transportation is provided by [employer]	[place] () per the patient     Social Determinants of Health     Financial Resource Strain:     Difficulty of Paying Living Expenses: Not on file   Food Insecurity:     Worried About [facility] in the Last Year: Not on file    Shayy of Food in the Last Year: Not on file   Transportation Needs:     Lack of Transportation (Medical): Not on file    Lack of Transportation (Non-Medical):  Not on file   Physical Activity:     Days of Exercise per Week: Not on file    Minutes of Exercise per Session: Not on file   Stress:     Feeling of Stress : Not on file   Social Connections:     Frequency of Communication with Friends and Family: Not on file    Frequency of Social Gatherings with Friends and Family: Not on file    Attends Worship Services: Not on file    Active Member of [address] [place] [address] or Organizations: Not on file    Attends Club or Organization Meetings: Not on file    Marital Status: Not on file   Intimate Partner Violence:     Fear of Current or Ex-Partner: Not on file    Emotionally Abused: Not on file    Physically Abused: Not on file    Sexually Abused: Not on file   Housing Stability:     Unable to Pay for Housing in the Last Year: Not on file    Number of [place] in the Last Year: Not on file    Unstable Housing in the Last Year: Not on file       SCREENINGS        [place] Coma Scale  Eye Opening: Spontaneous  Best Verbal Response: Oriented  Best Motor Response: Obeys commands  [place] Coma Scale Score: 15               PHYSICAL EXAM    (up to 7 for level 4, 8 or more for level 5)     ED Triage Vitals [11/13/21 1940]   BP Temp Temp src Pulse Resp SpO2 Height Weight   -- -- -- 130 26 (!) 86 % -- --       Physical Exam  Vitals and nursing note reviewed. Constitutional:       General: She is not in acute distress. Appearance: She is well-developed. She is not diaphoretic. HENT:      Head: Normocephalic and atraumatic. Mouth/Throat:      Pharynx: No oropharyngeal exudate. Eyes:      General: No scleral icterus. Conjunctiva/sclera: Conjunctivae normal.      Pupils: Pupils are equal, round, and reactive to light. Neck:      Trachea: No tracheal deviation. Cardiovascular:      Rate and Rhythm: Tachycardia present. Heart sounds: Normal heart sounds. Pulmonary:      Effort: Pulmonary effort is normal. No respiratory distress. Breath sounds: Examination of the right-lower field reveals decreased breath sounds. Examination of the left-lower field reveals decreased breath sounds. Decreased breath sounds present. Abdominal:      General: Bowel sounds are normal. There is no distension. Palpations: Abdomen is soft. Musculoskeletal:         General: Normal range of motion. Cervical back: Normal range of motion and neck supple. Skin:     General: Skin is warm and dry. Findings: No erythema or rash. Neurological:      Mental Status: She is alert and oriented to person, place, and time. Cranial Nerves: No cranial nerve deficit. Motor: No abnormal muscle tone. Psychiatric:         Behavior: Behavior normal.         Thought Content: Thought content normal.         Judgment: Judgment normal.         DIAGNOSTIC RESULTS     EKG: All EKG's are interpreted by the Emergency Department Physician who either signs or Co-signs this chart in the absence of a cardiologist.    EKG shows NSR with HR 70, normal axis, normal intervals, no ST changes. Incomplete RBBB.  T wave inversions in V4-V6 and I, II.       RADIOLOGY:   Non-plain film images such as CT, Ultrasound and MRI are read by the radiologist. Dayton Sanchez radiographic images are visualized and preliminarily interpreted by the emergency physician with the below findings:      Interpretation per the Radiologist below, if available at the time of this note:    CTA Chest W WO  (PE study)   Final Result       Motion artifact. No evidence of filling defect to suggest pulmonary    embolism. Again similar-appearing bilateral perihilar and lower zone predominant    groundless opacities are present which may represent pulmonary edema with    inflammatory or infectious process not excluded, clinically correlate.           XR CHEST PORTABLE    (Results Pending)         ED BEDSIDE ULTRASOUND:   Performed by ED Physician - none    LABS:  Labs Reviewed   COMPREHENSIVE METABOLIC PANEL - Abnormal; Notable for the following components:       Result Value    Sodium 134 (*)     BUN 32 (*)     CREATININE 1.34 (*)     GFR Non- 39.8 (*)     GFR  48.2 (*)     AST 41 (*)     Globulin 4.2 (*)     All other components within normal limits    Narrative:     Cornelio GARCIA tel. [phone],  Chemistry results called to and read back by SALMA MO, 11/13/2021 21:01, by  [facility]   CBC WITH AUTO DIFFERENTIAL - Abnormal; Notable for the following components:    RBC 4.15 (*)     Hemoglobin 10.6 (*)     Hematocrit 31.6 (*)     MCV 76.2 (*)     MCH 25.6 (*)     RDW 17.2 (*)     Neutrophils Absolute 6.8 (*)     Monocytes Absolute 0.9 (*)     All other components within normal limits   TROPONIN - Abnormal; Notable for the following components:    Troponin 0.038 (*)     All other components within normal limits    Narrative:     Zohaib Castañeda tel. [phone],  Chemistry results called to and read back by SALMA MO, 11/13/2021 21:01, by  Shabana Both - Abnormal; Notable for the following components:    Protime 15.2 (*)     All other components within normal limits   POCT ARTERIAL - Abnormal; Notable for the following components: POC Creatinine 1.5 (*)     GFR Non- 35 (*)     GFR  42 (*)     pO2, Arterial 126 (*)     O2 Sat, Arterial 99 (*)     POC Hematocrit 30 (*)     Hemoglobin 10.2 (*)     All other components within normal limits   POCT CREATININE - URINE - Normal   COVID-19 & INFLUENZA COMBO   CULTURE, BLOOD 1   CULTURE, BLOOD 2   MAGNESIUM    Narrative:     Zakiya Pro tel. [phone],  Chemistry results called to and read back by SALMA MO, 11/13/2021 21:01, by  [address] [place] [address] ACID, PLASMA   CK    Narrative:     Zakiya Pro tel. [phone],  Chemistry results called to and read back by SALAM MO, 11/13/2021 21:01, by  [address] [place] [address]    Narrative:     Zakiya Pro tel. [phone],  Chemistry results called to and read back by SALMA MO, 11/13/2021 21:01, by  [facility]    Narrative:     Zakiya Pro tel. [phone],  Chemistry results called to and read back by SALMA MO, 11/13/2021 21:01, by  [facility]   APTT   IRON AND TIBC   COMPREHENSIVE METABOLIC PANEL W/ REFLEX TO MG FOR LOW K   TROPONIN   TROPONIN       All other labs were within normal range or not returned as of this dictation. EMERGENCY DEPARTMENT COURSE and DIFFERENTIAL DIAGNOSIS/MDM:   Vitals:    Vitals:    11/13/21 1940 11/13/21 2156 11/13/21 2200 11/14/21 0058   BP:   127/87    Pulse: 130  65 65   Resp: 26  16    SpO2: (!) 86% 100% 98%        MDM     Pt is a 62 yo F who presents to the ED for evaluation of cp and  She is afebrile and tachycardic to 130 on arrival.  Per ED triage patient was saturating at 76 and 86% on her usual 3 L however when patient was roomed and placed on usual 3 L she was stable at 98%. She was given p.o. aspirin in the ED. CMP remarkable for BUN of 32 creatinine 1.34 GFR 39.8 stable from patient's baseline, CKD stage III. CBC is remarkable for anemia with hemoglobin of 10.6 stable from baseline. Troponin 0 0.038. Patient has chronic elevations in troponin.   Last admission cardiology thought chronic less likely related to NSTEMI. BNP 11,159. Blood cultures pending. Covid and flu negative. CT of the chest is negative for PE. There are perihilar and lower zone groundglass opacities representing pulmonary edema. Infectious process is not excluded. Patient does have a cough however she has a normal white count is afebrile with normal pro calcitonin. less concern for pneumonia at this time. Patient was given a dose of IV Lasix. Suspect chest pain and shortness of breath secondary to acute CHF exacerbation. She will be admitted to the floor. Spoke with Dr. Summer Rivas of [facility] who will consult on the floor. Pt stable for admission, Dr. Reyes Fleming accepts. REASSESSMENT          CRITICAL CARE TIME   Total Critical Care time was 0 minutes, excluding separately reportable procedures. There was a high probability of clinically significant/life threatening deterioration in the patient's condition which required my urgent intervention. CONSULTS:  IP CONSULT TO HEART FAILURE NURSE/COORDINATOR  IP CONSULT TO DIETITIAN  IP CONSULT TO CARDIOLOGY    PROCEDURES:  Unless otherwise noted below, none     Procedures        FINAL IMPRESSION      1. Acute on chronic congestive heart failure, unspecified heart failure type [facility])          DISPOSITION/PLAN   DISPOSITION Admitted 11/13/2021 10:53:54 PM      PATIENT REFERRED TO:  No follow-up provider specified. DISCHARGE MEDICATIONS:  Current Discharge Medication List        Controlled Substances Monitoring:     No flowsheet data found.     (Please note that portions of this note were completed with a voice recognition program.  Efforts were made to edit the dictations but occasionally words are mis-transcribed.)    Etelvina Clay PA-C (electronically signed)              Etelvina Clay PA-C  11/14/21 [id number]

## 2021-11-14 NOTE — PROGRESS NOTES
Hospitalist Progress Note      PCP: Magdalena Bae    Date of Admission: 11/13/2021    Chief Complaint:    Chief Complaint   Patient presents with    Chest Pain     Subjective:  Patient states she is feeling a little better but still has significant HARRIS. 12 point ROS negative other than mentioned above     Medications:  Reviewed    Infusion Medications    sodium chloride       Scheduled Medications    sodium chloride flush  5-40 mL IntraVENous 2 times per day    enoxaparin  40 mg SubCUTAneous Daily    metoprolol succinate  50 mg Oral Daily    furosemide  40 mg IntraVENous BID    isosorbide dinitrate  20 mg Oral TID    hydrALAZINE  50 mg Oral TID     PRN Meds: sodium chloride flush, sodium chloride, ondansetron **OR** ondansetron, polyethylene glycol, acetaminophen **OR** acetaminophen      Intake/Output Summary (Last 24 hours) at 11/14/2021 0923  Last data filed at 11/14/2021 0105  Gross per 24 hour   Intake 512.5 ml   Output 500 ml   Net 12.5 ml       Exam:    /61   Pulse 60   Temp 98.5 °F (36.9 °C) (Oral)   Resp 18   Ht 5' 4\" (1.626 m)   Wt 232 lb (105.2 kg)   SpO2 100%   BMI 39.82 kg/m²     General appearance: No apparent distress, appears stated age and cooperative. HEENT:  Conjunctivae/corneas clear. Neck:  Trachea midline. Respiratory:  Normal respiratory effort. Clear to auscultation  Cardiovascular: Regular rate and rhythm   Abdomen: Soft, non-tender, non-distended with normal bowel sounds.   Musculoskeletal: +1 edema   Neuro: Non Focal.   Capillary Refill: Brisk,< 3 seconds   Peripheral Pulses: +2 palpable, equal bilaterally     Labs:   Recent Labs     11/13/21 1945 11/13/21 2154   WBC 10.2  --    HGB 10.6* 10.2*   HCT 31.6*  --      --      Recent Labs     11/13/21 1945 11/13/21 2154 11/14/21 0229   *  --  134*   K 4.4  --  3.8   CL 99  --  98   CO2 25  --  24   BUN 32*  --  33*   CREATININE 1.34* 1.5* 1.30*   CALCIUM 9.0  --  9.0     Recent Labs 11/13/21 1945 11/14/21 0229   AST 41* 28   ALT 27 25   BILITOT 0.4 0.5   ALKPHOS 104 96     Recent Labs     11/13/21 2000   INR 1.2     Recent Labs     11/13/21 1945 11/14/21 0229   CKTOTAL 78  --    TROPONINI 0.038* 0.035*     Urinalysis:      Lab Results   Component Value Date    NITRU Negative 10/20/2021    BLOODU Negative 10/20/2021    SPECGRAV 1.016 10/20/2021    GLUCOSEU Negative 10/20/2021     Radiology:  CTA Chest W WO  (PE study)   Final Result       Motion artifact. No evidence of filling defect to suggest pulmonary    embolism. Again similar-appearing bilateral perihilar and lower zone predominant    groundless opacities are present which may represent pulmonary edema with    inflammatory or infectious process not excluded, clinically correlate. XR CHEST PORTABLE   Final Result      No radiographic evidence of acute intrathoracic process. Assessment/Plan:    #Acute on chronic systolic and diastolic CHF    - continue IV lasix    - awaiting med rec to resume her home meds     - cardiology consulted for their opinion    #Chest pain    - improving; cardiology consulted; trending troponins; flat pattern    #CKD   - renally dose meds    #Hypothyroidism    - continue home synthroid once med reconciled    #Depression    - home meds once reconciled    #DMII    - lantus with preprandial and SSI    Active Hospital Problems    Diagnosis Date Noted    Depression [F32. A]     [facility] [I50.9] 11/13/2021    Elevated troponin [R77.8] 08/22/2021    CKD (chronic kidney disease) stage 3, GFR 30-59 ml/min ([facility]) [N18.30]     Chest pain [R07.9] 02/11/2020    Diabetes mellitus ([facility] [E11.9] 02/25/2019    Thyroid disease [E07.9] 02/25/2019       Additional work up or/and treatment plan may be added today or then after based on clinical progression. I am managing a portion of pt care. Some medical issues are handled by other specialists.  Additional work up and treatment should be done in out pt setting by pt PCP and other out pt providers. In addition to examining and evaluating pt, I spent additional time explaining care, normal and abnormal findings, and treatment plan. All of pt questions were answered. Counseling, diet and education were  provided. Case will be discussed with nursing staff when appropriate. Family will be updated if and when appropriate. Diet: ADULT DIET; Regular;  Low Sodium (2 gm)    Code Status: Full Code    PT/OT Eval     Electronically signed by Suzanne Sanders MD on 11/14/2021 at 9:23 AM

## 2021-11-14 NOTE — ED NOTES
Michelle CORONA in room assessing patient using video  service, #[id number]/Janene Rutherford RN  11/13/21 1950

## 2021-11-14 NOTE — FLOWSHEET NOTE
Pt awake in bed. Accepted AM meds without problem. VSS. Tele SR. Respirations even and nonlabored. Dr. Hogan Sentinel was in to see pt.

## 2021-11-14 NOTE — H&P
a heart failure and pneumonia. There is no history of PE. INFORMATION FROM PATIENT AND HER . She presents w 12 hour history of chest pain and SOB. Pain left side of chest accompanied by SOB. No N/V, abdominal pain, diaphoresis, jaw or arm pain. She apparently was recently treated for pneumonia w/ atbx. Per , no excess fatigue, or decrease in her activity level. PAST MEDICAL HISTORY:    Past Medical History:   Diagnosis Date    Asthma     CAD (coronary artery disease)     CKD (chronic kidney disease) stage 3, GFR 30-59 ml/min ([facility])     Colitis     [facility]     Hyperlipidemia     Hypertension     PAD (peripheral artery disease) ([facility])     Prolonged emergence from general anesthesia     PVD (peripheral vascular disease) ([facility] [address] [facility]     Thyroid disease      PAST SURGICAL HISTORY:    Past Surgical History:   Procedure Laterality Date    CARDIAC SURGERY      CATARACT REMOVAL WITH IMPLANT Bilateral 11- 11-     SECTION      COLONOSCOPY N/A 2019    COLONOSCOPY DIAGNOSTIC performed by Zulma Foote MD at [address] [place] [address] GRAFT  2019    unknown vessels    HYSTERECTOMY      TOE AMPUTATION Right     3rd toe     FAMILY HISTORY:  No family history on file.   SOCIAL HISTORY:    Social History     Socioeconomic History    Marital status:      Spouse name: Not on file    Number of children: Not on file    Years of education: Not on file    Highest education level: Not on file   Occupational History    Not on file   Tobacco Use    Smoking status: Never Smoker    Smokeless tobacco: Never Used   Vaping Use    Vaping Use: Never used   Substance and Sexual Activity    Alcohol use: Never    Drug use: Never    Sexual activity: Not on file   Other Topics Concern    Not on file   Social History Narrative         Lives With: Spouse    Type of Home: [address] [place] [address] apt [facility] Layout: One level    Home Access: Elevator    Bathroom Shower/Tub: Tub/Shower unit(Simultaneous filing. User may not have seen previous data.)    Bathroom Equipment: Grab bars in shower, Shower chair    Home Equipment: Rolling walker, [address] bed    ADL Assistance: Needs assistance    Homemaking Assistance: Needs assistance    Homemaking Responsibilities: No    Ambulation Assistance: Independent    Transfer Assistance: Independent    Active : No    Additional Comments: Pt wears orthopedic shoes at baseline    The patient states she is current with [facility](RN per the ). The patient had a walker, but obtained a hospital bed, wheel chair, BSC and O2 last admission (from [address]). pharmacy is [address] [place] [address],[address] [address] [place]. Transportation is provided by  Home	[place] () per the patient     Social Determinants of Health     Financial Resource Strain:     Difficulty of Paying Living Expenses: Not on file   Food Insecurity:     Worried About [address] in the Last Year: Not on file    Shayy of Food in the Last Year: Not on file   Transportation Needs:     Lack of Transportation (Medical): Not on file    Lack of Transportation (Non-Medical):  Not on file   Physical Activity:     Days of Exercise per Week: Not on file    Minutes of Exercise per Session: Not on file   Stress:     Feeling of Stress : Not on file   Social Connections:     Frequency of Communication with Friends and Family: Not on file    Frequency of Social Gatherings with Friends and Family: Not on file    Attends [employer] Services: Not on file    Active Member of Clubs or Organizations: Not on file    Attends Club or Organization Meetings: Not on file    Marital Status: Not on file   Intimate Partner Violence:     Fear of Current or Ex-Partner: Not on file    Emotionally Abused: Not on file    Physically Abused: Not on file    Sexually Abused: Not on file   Housing Stability:     Unable to Pay for Housing in the Last Year: Not on file    Number of Places Lived in the Last Year: Not on file    Unstable Housing in the Last Year: Not on file     MEDICATIONS:   Prior to Admission medications    Medication Sig Start Date End Date Taking? Authorizing Provider   Dulaglutide (TRULICITY) 0.69 XO/6.1HK SOPN Inject 0.75 mg into the skin once a week 9/22/21   Shreyas Martinez MD   levothyroxine (SYNTHROID) 50 MCG tablet take 1 tablet by mouth once daily 9/22/21   Shreyas Martinez MD   insulin lispro, 1 Unit Dial, (HUMALOG KWIKPEN) 100 UNIT/ML SOPN 15  units at each meals 9/22/21   Shreyas Martinez MD   Insulin Pen Needle (NOVOFINE) 32G X 6 MM MISC qid 9/22/21   Shreyas Martinez MD   ammonium lactate (LAC-HYDRIN) 12 % lotion  6/23/21   Historical Provider, MD   atorvastatin (LIPITOR) 80 MG tablet take 1 tablet by mouth at bedtime 8/6/21   Historical Provider, MD   diclofenac sodium (VOLTAREN) 1 % GEL apply 4 grams to affected area three times a day AS NEEDED FOR PAIN 8/22/21   Historical Provider, MD   doxepin (SINEQUAN) 25 MG capsule  8/6/21   Historical Provider, MD   insulin glargine (LANTUS SOLOSTAR) 100 UNIT/ML injection pen 50 units at bedtime 9/9/21   Shreyas Martinez MD   amoxicillin-clavulanate (AUGMENTIN) 875-125 MG per tablet Take 1 tablet by mouth 2 times daily     Historical Provider, MD   ondansetron (ZOFRAN-ODT) 4 MG disintegrating tablet Take 1 tablet by mouth 3 times daily as needed for Nausea or Vomiting 9/8/21   Slick Andrew DO   ranolazine (RANEXA) 1000 MG extended release tablet Take 1 tablet by mouth 2 times daily 8/25/21   Leticia Perez MD   gabapentin (NEURONTIN) 600 MG tablet Take 600 mg by mouth 2 times daily.     Historical Provider, MD   albuterol sulfate HFA (VENTOLIN HFA) 108 (90 Base) MCG/ACT inhaler Inhale 2 puffs into the lungs as needed for Wheezing Every 4-6 hours PRN 6/29/21   Hyla Heimlich, MD   albuterol (PROVENTIL) (2.5 MG/3ML) 0.083% nebulizer solution Take 3 mLs by nebulization every 6 hours as needed for Wheezing 6/29/21 8/22/21  Jelani Izquierdo MD   montelukast (SINGULAIR) 10 MG tablet Take 1 tablet by mouth nightly 6/29/21   Jelani Izquierdo MD   Continuous Blood Gluc Sensor (FREESTYLE FELY 14 DAY SENSOR) MISC Every 2 weeks 6/23/21   Pily Ha MD   Insulin Pen Needle ([employer] PEN NEEDLES 31G) 31G X 8 MM MISC 1 each by Does not apply route 4 times daily 6/23/21   Pily Ha MD   Alcohol Swabs (ALCOHOL PREP) 70 % PADS qid 3/24/21   Pily Ha MD   oxybutynin (DITROPAN-XL) 5 MG extended release tablet take 1 tablet by mouth once daily 3/19/21   Katie Garnett MD   RESTASIS 0.05 % ophthalmic emulsion  3/8/21   Historical Provider, MD   neomycin-polymyxin-dexameth (MAXITROL) 3.5-73326-2.1 ophthalmic suspension instill 1 drop into both eyes four times a day 1/5/21   Historical Provider, MD   ARTIFICIAL TEARS 1.4 % ophthalmic solution  3/8/21   Historical Provider, MD   docusate sodium (COLACE, DULCOLAX) 100 MG CAPS Take 100 mg by mouth 2 times daily 10/3/20   Joann Marx MD   prazosin (MINIPRESS) 2 MG capsule Take 2 mg by mouth nightly  9/14/20   Historical Provider, MD   sertraline (ZOLOFT) 100 MG tablet Take 200 mg by mouth daily  9/14/20   Historical Provider, MD   Continuous Blood Gluc Sensor (FREESTYLE FELY 14 DAY SENSOR) MISC Every 2 weeks 9/21/20   Pily Ha MD   Continuous Blood Gluc  (FREESTYLE FELY 14 DAY READER) CATHLEEN As directed 9/21/20   Pily Ha MD   furosemide (LASIX) 40 MG tablet Take 1 tablet by mouth daily 9/4/20   Cristofer Pratt MD   ticagrelor (BRILINTA) 90 MG TABS tablet Take 90 mg by mouth 2 times daily    Historical Provider, MD   CPAP Machine MISC by Does not apply route New CPAP with 10 cm 8/20/20   Jelani Izquierdo MD   aspirin 81 MG EC tablet Take 1 tablet by mouth daily 6/30/20   Terrence Pickering MD   OXYGEN 2 lit O2 with sleep , please give O2 concentrator 6/12/20   Jelani Izquierdo MD Emollient (EUCERIN INTENSIVE REPAIR HAND) 2.5-10 % CREA APPLY TO FEET AT BEDTIME; PLACE SOCKS OVER FEET AFTER APPLICATION IF NEEDED FOR DRY CRACKING FEET 3/15/20   Historical Provider, MD   hydrOXYzine (VISTARIL) 25 MG capsule Take 50 mg by mouth 3 times daily as needed  3/4/20   Historical Provider, MD   Nutritional Supplements ([address]) LIQD take as directed three times a day 6/1/20   Historical Provider, MD   isosorbide dinitrate (ISORDIL) 20 MG tablet Take 1 tablet by mouth 3 times daily 4/28/20   Joann Cheema MD   nitroGLYCERIN (NITROSTAT) 0.4 MG SL tablet up to max of 3 total doses. If no relief after 1 dose, call 911. 4/28/20   Joann Cheema MD   hydrALAZINE (APRESOLINE) 50 MG tablet Take 1 tablet by mouth every 8 hours 4/28/20   Joann Cheema MD   metoprolol succinate (TOPROL XL) 50 MG extended release tablet Take 1 tablet by mouth 2 times daily 4/28/20   Joann Cheema MD   amitriptyline (ELAVIL) 25 MG tablet Take 25 mg by mouth nightly    Historical Provider, MD   gabapentin (NEURONTIN) 400 MG capsule Take 400 mg by mouth 2 times daily. AM and 800 mg in pm-9pm    Historical Provider, MD   haloperidol (HALDOL) 5 MG tablet Take 5 mg by mouth daily    Historical Provider, MD   FreeStyle Lancets MISC 1 each by Does not apply route daily 1/30/20   Radha Kaufman MD   blood glucose test strips (FREESTYLE LITE) strip 1 each by In Vitro route daily As needed.  1/30/20   Radha Kaufman MD   folic acid (FOLVITE) 1 MG tablet Take 1 mg by mouth daily    Historical Provider, MD   Iron Polysacch Vwqui-M03-MR (NIFEREX-150 FORTE PO) Take 1 tablet by mouth daily     Historical Provider, MD   Cyanocobalamin (VITAMIN B-12) 1000 MCG extended release tablet Take 1,000 mcg by mouth daily    Historical Provider, MD   meclizine (ANTIVERT) 25 MG tablet Take 25 mg by mouth three times daily    Historical Provider, MD       ALLERGIES: Ambien [zolpidem tartrate], Capoten [captopril], Clioquinol, Cogentin [benztropine], Depakote [divalproex sodium], Effexor xr [venlafaxine hcl er], Geodon [ziprasidone hcl], Lisinopril, Lyrica [pregabalin], Navane [thiothixene], Pamelor [nortriptyline hcl], Remeron [mirtazapine], Risperdal [risperidone], Trazodone and nefazodone, and Wellbutrin [bupropion]    REVIEW OF SYSTEM:   Review of Systems   Constitutional: Negative for chills, diaphoresis, fatigue and fever. HENT: Negative for sore throat and trouble swallowing. Eyes: Negative. Respiratory: Positive for shortness of breath. Negative for cough, chest tightness and wheezing. Cardiovascular: Positive for chest pain. Negative for palpitations. Gastrointestinal: Negative for abdominal pain, constipation, diarrhea, nausea and vomiting. Endocrine: Negative. Genitourinary: Negative for dysuria, flank pain and urgency. Musculoskeletal: Negative. Skin: Negative. Neurological: Negative for dizziness, syncope, speech difficulty, weakness, numbness and headaches. Psychiatric/Behavioral: Negative. OBJECTIVE  PHYSICAL EXAM:   Physical Exam  Vitals and nursing note reviewed. Constitutional:       General: She is awake. She is not in acute distress. Appearance: She is obese. She is not ill-appearing or diaphoretic. HENT:      Head: Normocephalic and atraumatic. Nose: Nose normal.      Mouth/Throat:      Pharynx: Oropharynx is clear. Eyes:      Conjunctiva/sclera: Conjunctivae normal.   Neck:      Vascular: No carotid bruit. Cardiovascular:      Rate and Rhythm: Normal rate and regular rhythm. Pulses: Normal pulses. Heart sounds: Normal heart sounds. No murmur heard. Pulmonary:      Effort: Pulmonary effort is normal.      Breath sounds: No wheezing or rhonchi. Comments: Diminished bases. Abdominal:      General: Abdomen is flat. Bowel sounds are normal.      Palpations: Abdomen is soft. Tenderness: There is no abdominal tenderness.    Musculoskeletal: General: Normal range of motion. Cervical back: No muscular tenderness. Right lower leg: Edema (mild) present. Left lower leg: Edema (mild) present. Lymphadenopathy:      Cervical: No cervical adenopathy. Skin:     General: Skin is warm. Capillary Refill: Capillary refill takes less than 2 seconds. Neurological:      Mental Status: She is alert and oriented to person, place, and time. Psychiatric:         Mood and Affect: Mood normal.         Behavior: Behavior normal. Behavior is cooperative. /87   Pulse 65   Resp 16   SpO2 98%     DATA:     Diagnostic tests reviewed for today's visit:    Most recent labs and imaging results reviewed.      LABS:    Recent Results (from the past 24 hour(s))   Comprehensive Metabolic Panel    Collection Time: 11/13/21  7:45 PM   Result Value Ref Range    Sodium 134 (L) 135 - 144 mEq/L    Potassium 4.4 3.4 - 4.9 mEq/L    Chloride 99 95 - 107 mEq/L    CO2 25 20 - 31 mEq/L    Anion Gap 10 9 - 15 mEq/L    Glucose 73 70 - 99 mg/dL    BUN 32 (H) 8 - 23 mg/dL    CREATININE 1.34 (H) 0.50 - 0.90 mg/dL    GFR Non-African American 39.8 (L) >60    GFR  48.2 (L) >60    Calcium 9.0 8.5 - 9.9 mg/dL    Total Protein 8.0 6.3 - 8.0 g/dL    Albumin 3.8 3.5 - 4.6 g/dL    Total Bilirubin 0.4 0.2 - 0.7 mg/dL    Alkaline Phosphatase 104 40 - 130 U/L    ALT 27 0 - 33 U/L    AST 41 (H) 0 - 35 U/L    Globulin 4.2 (H) 2.3 - 3.5 g/dL   CBC Auto Differential    Collection Time: 11/13/21  7:45 PM   Result Value Ref Range    WBC 10.2 4.8 - 10.8 K/uL    RBC 4.15 (L) 4.20 - 5.40 M/uL    Hemoglobin 10.6 (L) 12.0 - 16.0 g/dL    Hematocrit 31.6 (L) 37.0 - 47.0 %    MCV 76.2 (L) 82.0 - 100.0 fL    MCH 25.6 (L) 27.0 - 31.3 pg    MCHC 33.6 33.0 - 37.0 %    RDW 17.2 (H) 11.5 - 14.5 %    Platelets 319 059 - 378 K/uL    Neutrophils % 66.6 %    Lymphocytes % 21.8 %    Monocytes % 9.1 %    Eosinophils % 1.7 %    Basophils % 0.8 %    Neutrophils Absolute 6.8 (H) 1.4 - 6.5 K/uL    Lymphocytes Absolute 2.2 1.0 - 4.8 K/uL    Monocytes Absolute 0.9 (H) 0.2 - 0.8 K/uL    Eosinophils Absolute 0.2 0.0 - 0.7 K/uL    Basophils Absolute 0.1 0.0 - 0.2 K/uL   Magnesium    Collection Time: 11/13/21  7:45 PM   Result Value Ref Range    Magnesium 1.9 1.7 - 2.4 mg/dL   Lactic Acid, Plasma    Collection Time: 11/13/21  7:45 PM   Result Value Ref Range    Lactic Acid 0.8 0.5 - 2.2 mmol/L   Troponin    Collection Time: 11/13/21  7:45 PM   Result Value Ref Range    Troponin 0.038 (HH) 0.000 - 0.010 ng/mL   CK    Collection Time: 11/13/21  7:45 PM   Result Value Ref Range    Total CK 78 0 - 170 U/L   Brain Natriuretic Peptide    Collection Time: 11/13/21  7:45 PM   Result Value Ref Range    Pro-BNP 11,159 pg/mL   PROCALCITONIN    Collection Time: 11/13/21  7:45 PM   Result Value Ref Range    Procalcitonin 0.05 0.00 - 0.15 ng/mL   EKG 12 Lead - Chest Pain    Collection Time: 11/13/21  7:51 PM   Result Value Ref Range    Ventricular Rate 70 BPM    Atrial Rate 70 BPM    P-R Interval 190 ms    QRS Duration 108 ms    Q-T Interval 384 ms    QTc Calculation (Bazett) 414 ms    P Axis 56 degrees    R Axis 65 degrees    T Axis 199 degrees   APTT    Collection Time: 11/13/21  8:00 PM   Result Value Ref Range    aPTT 32.4 24.4 - 36.8 sec   Protime-INR    Collection Time: 11/13/21  8:00 PM   Result Value Ref Range    Protime 15.2 (H) 12.3 - 14.9 sec    INR 1.2    POCT CREATININE    Collection Time: 11/13/21  8:13 PM   Result Value Ref Range    POC CREATININE WHOLE BLOOD 1.5    COVID-19 & Influenza Combo    Collection Time: 11/13/21  9:41 PM    Specimen: Nasopharyngeal Swab   Result Value Ref Range    SARS-CoV-2 RNA, RT PCR NOT DETECTED NOT DETECTED    INFLUENZA A NOT DETECTED NOT DETECTED    INFLUENZA B NOT DETECTED NOT DETECTED   POCT Arterial    Collection Time: 11/13/21  9:54 PM   Result Value Ref Range    POC Sodium 136 136 - 145 mEq/L    POC Potassium 3.7 3.5 - 5.1 mEq/L    POC Chloride 102 99 - 110 mEq/L POC Glucose 66 60 - 115 mg/dl    POC Creatinine 1.5 (H) 0.6 - 1.2 mg/dL    GFR Non-African American 35 (A) >60    GFR  42 (A) >60    Calcium, Ion 1.16 1.12 - 1.32 mmol/L    pH, Arterial 7.430 7.350 - 7.450    pCO2, Arterial 39 35 - 45 mm Hg    pO2, Arterial 126 (HH) 75 - 108 mm Hg    HCO3, Arterial 25.7 21.0 - 29.0 mmol/L    Base Excess, Arterial 1 -3 - 3    O2 Sat, Arterial 99 (HH) 93 - 100 %    TCO2, Arterial 27 22 - 29    Lactate 0.71 0.40 - 2.00 mmol/L    POC Hematocrit 30 (L) 36 - 48 %    Hemoglobin 10.2 (L) 12.0 - 16.0 gm/dL    FIO2 4.000     Sample Type ART     Performed on SEE BELOW        IMAGING:  CTA Chest W WO  (PE study)    Result Date: 11/13/2021  Patient: Kizzy Mistry  Time Out: 21:12 Exam(s): CTA CHEST With Contrast  History:  Reason for exam: chest pain, sob, hypoxia, r/o pe. Chief Complaint: cp, sob, dyspnea, possible covid, hypoxia  Exam: CTA CHEST W Contrast  MIP images obtained Technique more: All CT scan at this facility use dose modulation, iterative reconstruction, and/or weight based dosing when appropriate to reduce radiation dose to as low as reasonably achievable. Comparison: 9/1/2020  FINDINGS:  Motion artifact. No evidence of filling defect to suggest pulmonary embolism. Thoracic aorta appears within limits. Atherosclerotic changes at the aortic arch noted. Status post median sternotomy. No pericardial or pleural effusion. The central airways appear patent. Again similar- appearing bilateral perihilar and lower zone predominant groundless opacities are present which may represent pulmonary edema with inflammatory or infectious process not excluded, clinically correlate. Electronically signed by Charissa Mills M.D. on 11-13-21 at 2112     Motion artifact. No evidence of filling defect to suggest pulmonary embolism.    Again similar-appearing bilateral perihilar and lower zone predominant groundless opacities are present which may represent pulmonary edema with inflammatory or infectious process not excluded, clinically correlate. VTE Prophylaxis: low molecular weight heparin -  start    ASSESSMENT AND PLAN  Principal Problem:    Acute decompensated heart failure - started earlier today - chest pain, SOB. Known CHF, has EF 25%,  Mild LE edema. Diminished BSs. Troponin elevated - 0.038  EKG Normal sinus rhythm IRBBB,  ST and T wave abnormality  Normal sinus rhythm  Low voltage QRS  Incomplete right bundle branch block  Septal infarct , age undetermined  ST & T wave abnormality, consider inferolateral ischemia  Plan: admit  Serial troponin   EKG in AM.   Cardiology consult. Chest pain  Pressure, squeezing left chest.  No N/V, diaphoresis, jaw or arm pain. Troponin 0.038    Plan: cardiology consult,  Serial troponin   EKG in AM      Elevated troponin  0.038  W/ complaint of chest pain, pressure. Known CAD  Plan: serial troponin, cardiology consult. Diabetes mellitus  Lantus 50 at night  trulicity weekly     humalog 15u w meals. Last A1c 7.0   BS 73  Plan: Lantus 50u nightly    SSI w meals. CKD (chronic kidney disease) stage 3, GFR 35  B/ Scr  32/1.34   Baseline around 1.0   She will need ongoing diuresis. Plan: monitor labs. neprhology consult if decline. Thyroid disease  Low dose synthroid  50 mcg. Last TSH 1.970  Plan:   Stable   Continue same. Depression    Stable on meds. Plan: continue meds  Awaiting verification.          Plan of care discussed with: patient and spouse    SIGNATURE: ESDRAS Arenas CNP  DATE: November 14, 2021  TIME: 12:03 AM   Ana Prater MD  - supervising physician

## 2021-11-14 NOTE — FLOWSHEET NOTE
Patient arrived to the floor from E.D. via cart,she was able to walk from the doorway to her bed with standby assist.No shortness of breath,no complain of chest pain. 0050 she voided 500 ml of clear yellow urine ,normal odor. she denies burning or pain during or after urination. patient was assisted back to bed,her gait is steady with the walker,alert and oriented but speaks very little english,no jugular vein distention,heart rate regular,lungs clear but diminished,abdomen soft and obese,surgical scar,no calf pain,trace edema in her legs and feet. 01:00 I am unable to verify her medications at this time,the  machine battery is depleted,so will wait till then    1:10 Dr. Danna Carcamo  is in the talking with the patient.

## 2021-11-14 NOTE — CARE COORDINATION
[facility] EMERGENCY [facility] AT [facility] Case Management Initial Discharge Assessment    Met with Patient to discuss discharge plan. PCP: Tim Jacobs                                Date of Last Visit: 1 Month ago    If no PCP, list provided? N/A    Discharge Planning    Living Arrangements: independently at home and requires help     Who do you live with? Spouse    Who helps you with your care:  self or spouse    If lives at home:     Do you have any barriers navigating in your home? yes - Pt has walker     Patient can perform ADL? Yes    Current Services (outpatient and in home) :  [address] [email or web address] [facility] [employer] pt says has 2 hours /day m-f)    Dialysis: No    Is transportation available to get to your appointments? Yes - Pt states she drives    DME Equipment:  yes - Hospital bed,wheelchair,cane,walker    Respiratory equipment: Continuous Oxygen  3 Liters  and CPAP with 3 Liters of O2    Respiratory provider:  yes - Pt not sure     Pharmacy:  yes - [facility] with Medication Assistance Program?  No      Patient agreeable to [facility]? Yes, [employer]     Patient agreeable to SNF/Rehab? Declined    Other discharge needs identified? Other Nutrition education for CHF    Does Patient Have a High-Risk for Readmission Diagnosis (CHF, PN, MI, COPD)? Yes    If Yes,     Consult with pulmonologist? No   Consult with cardiologist? Yes   Cardiac Rehab referral if EF <35%? N/A   Consult with Pharmacy for medication assessment prior to discharge? Yes   Consult with Behavioral health to aid in depression, anxiety, or coping issues? N/A   Palliative Care Consult? N/A   Pulmonary Rehab order for COPD, PN, and CHF (if EF > 35%)? N/A    Does patient have a reliable scale and know how to read it (for CHF)? No - PT states she did get a scale and it does not work so she has not been weighing herself.  Nutrition consult for CHF? Yes     Initial Discharge Plan?  (Note: please see concurrent daily documentation for any updates after initial note). Met with patient and her dtr and Kiswahili aide who helped interpret everything. Pt has equip but ould like rollator. She states last time she was here she took order to [address] and has not heard anything yet. She feels safe and comfortable to dc home and continue with [employer] as she has. She would like education for her diet for CHF.      Readmission Risk              Risk of Unplanned Readmission:  32         Electronically signed by Eliana Borrero on 11/14/2021 at 11:46 AM

## 2021-11-14 NOTE — CONSULTS
[facility] Cardiology Consult Note        Date of Consult:   2021    Patient:    Serena Patton    :    1955  CSN:    [id number]    Consulting Cardiologist: Maurice Fuentes MD     Primary Cardiologist: Delfina Mota MD [facility]    Requesting Physician:  Hamzah Romeo MD      Reason for Consult:  SOB, Chest pain, possible CHF        Assessment:    1. SOB  2. Chest discomfort, chronic, reproducible, musculoskeletal  3. Elevated troponins, low flat pattern, not acute true myocardial infarction, due to demand type 2 MI.  4. CHF Systollic, Acute on Chronic. 5. Coronary artery disease, post prior CABG, prior left main left circumflex stent, known 100% right coronary occlusion chronic, last cath  with patent stent and bypass, medical treatment. 6. Ischemic Cardiomyopathy, with anterior hypokinesis, LVEF 35-40% by echo, 56% by Myoview .  7. Negative Lexiscan Myoview stress test for significant MI or Ischemia, Normal LV function, LVEF 56%, 21. 8. Abnormal electrocardiogram with first-degree AV block and anterior MI.  9. Right bundle branch block, new, alternating, now resolved. 10. Hypertension  11. Hyperlipidemia  12. Renal insufficiency, chronic  13. Anemia  14. Pulmonary hypertension  15. Peripheral vascular disease, history of right toe amputation. 16. Schizoaffective disorder  17. Family history of CAD    Plan:    1. Cardiac Supportive Care  2. Continue prior Medications. 3. Serial Troponins. 4. Further Recommendations to follow  5. See Orders       HISTORY OF PRESENT ILLNESS:      Serena Patton is a pleasant 61 y.o. female who presented with the above past cardiac history now with worsening SOB and Chest Pain. Has increased troponins and cardiac markers. Cardiology asked to see in Consultation. Patient History and Records, EMR reviewed. Patient Interviewed and examined. Khmer speaking,  present. Denies LH, Dizziness, TIA or CVA Symptoms. No Palpitations. No Syncope. No Fever, Chills or Cold symptoms. No GI,  or Bleeding complaints. Cardiac and general ROS otherwise negative. [address] otherwise negative other than noted. Past Medical History:   Diagnosis Date    Asthma     CAD (coronary artery disease)     CKD (chronic kidney disease) stage 3, GFR 30-59 ml/min ([facility])     Colitis     Diabetes mellitus ([address]     Hyperlipidemia     Hypertension     PAD (peripheral artery disease) ([facility])     Prolonged emergence from general anesthesia     PVD (peripheral vascular disease) ([address]     Thyroid disease        Past Surgical History:   Procedure Laterality Date    CARDIAC SURGERY      CATARACT REMOVAL WITH IMPLANT Bilateral 11- 11-     SECTION      COLONOSCOPY N/A 2019    COLONOSCOPY DIAGNOSTIC performed by Santy Duarte MD at [facility]  2019    unknown vessels    HYSTERECTOMY      TOE AMPUTATION Right     3rd toe       Prior to Admission medications    Medication Sig Start Date End Date Taking? Authorizing Provider   oxyCODONE-acetaminophen (PERCOCET) 5-325 MG per tablet Take 1 tablet by mouth every 4 hours as needed for Pain. Yes Historical Provider, MD   atorvastatin (LIPITOR) 40 MG tablet Take 40 mg by mouth daily   Yes Historical Provider, MD   diclofenac sodium (VOLTAREN) 1 % GEL apply 4 grams to affected area three times a day AS NEEDED FOR PAIN 21  Yes Historical Provider, MD   gabapentin (NEURONTIN) 600 MG tablet Take 600 mg by mouth 2 times daily.    Yes Historical Provider, MD   albuterol sulfate HFA (VENTOLIN HFA) 108 (90 Base) MCG/ACT inhaler Inhale 2 puffs into the lungs as needed for Wheezing Every 4-6 hours PRN 21  Yes Georgina Pastor MD   albuterol (PROVENTIL) (2.5 MG/3ML) 0.083% nebulizer solution Take 3 mLs by nebulization every 6 hours as needed for Wheezing 21 Yes Georgina Pastor MD   montelukast (SINGULAIR) 10 MG tablet Take 1 tablet by mouth nightly 6/29/21  Yes Lucy Smalls MD   Dulaglutide (TRULICITY) 4.32 AE/5.1HS SOPN Inject 0.75 mg into the skin once a week 9/22/21   Scott Iverson MD   levothyroxine (SYNTHROID) 50 MCG tablet take 1 tablet by mouth once daily 9/22/21   Scott Iverson MD   insulin lispro, 1 Unit Dial, (HUMALOG KWIKPEN) 100 UNIT/ML SOPN 15  units at each meals 9/22/21   Scott Iverson MD   Insulin Pen Needle (NOVOFINE) 32G X 6 MM MISC qid 9/22/21   Scott Iverson MD   ammonium lactate (LAC-HYDRIN) 12 % lotion  6/23/21   Historical Provider, MD   atorvastatin (LIPITOR) 80 MG tablet take 1 tablet by mouth at bedtime 8/6/21   Historical Provider, MD   doxepin (SINEQUAN) 25 MG capsule  8/6/21   Historical Provider, MD   insulin glargine (LANTUS SOLOSTAR) 100 UNIT/ML injection pen 50 units at bedtime 9/9/21   Scott Iverson MD   amoxicillin-clavulanate (AUGMENTIN) 875-125 MG per tablet Take 1 tablet by mouth 2 times daily     Historical Provider, MD   ondansetron (ZOFRAN-ODT) 4 MG disintegrating tablet Take 1 tablet by mouth 3 times daily as needed for Nausea or Vomiting 9/8/21   Lesvia Vallejo DO   ranolazine (RANEXA) 1000 MG extended release tablet Take 1 tablet by mouth 2 times daily 8/25/21   Ramiro Sparks MD   Continuous Blood Gluc Sensor (FREESTYLE FELY 14 DAY SENSOR) MISC Every 2 weeks 6/23/21   Scott Iverson MD   Insulin Pen Needle ([employer] PEN NEEDLES 31G) 31G X 8 MM MISC 1 each by Does not apply route 4 times daily 6/23/21   Scott Iverson MD   Alcohol Swabs (ALCOHOL PREP) 70 % PADS qid 3/24/21   Scott Iverson MD   oxybutynin (DITROPAN-XL) 5 MG extended release tablet take 1 tablet by mouth once daily 3/19/21   Lucila Allen MD   RESTASIS 0.05 % ophthalmic emulsion  3/8/21   Historical Provider, MD   neomycin-polymyxin-dexameth (MAXITROL) 3.5-35947-4.1 ophthalmic suspension instill 1 drop into both eyes four times a day 1/5/21   Historical Provider, MD   ARTIFICIAL TEARS 1.4 % ophthalmic solution  3/8/21   Historical Provider, MD   docusate sodium (COLACE, DULCOLAX) 100 MG CAPS Take 100 mg by mouth 2 times daily 10/3/20   Moe Nix MD   prazosin (MINIPRESS) 2 MG capsule Take 2 mg by mouth nightly  9/14/20   Historical Provider, MD   sertraline (ZOLOFT) 100 MG tablet Take 200 mg by mouth daily  9/14/20   Historical Provider, MD   Continuous Blood Gluc Sensor (FREESTYLE FELY 14 DAY SENSOR) MISC Every 2 weeks 9/21/20   Riana Arana MD   Continuous Blood Gluc  (FREESTYLE FELY 14 DAY READER) CATHLEEN As directed 9/21/20   Riana Arana MD   furosemide (LASIX) 40 MG tablet Take 1 tablet by mouth daily 9/4/20   Mariza Germain MD   ticagrelor (BRILINTA) 90 MG TABS tablet Take 90 mg by mouth 2 times daily    Historical Provider, MD   CPAP Machine MISC by Does not apply route New CPAP with 10 cm 8/20/20   Nav Calabrese MD   aspirin 81 MG EC tablet Take 1 tablet by mouth daily 6/30/20   Tamera Renteria MD   OXYGEN 2 lit O2 with sleep , please give O2 concentrator 6/12/20   Nav Calabrese MD   Emollient (EUCERIN INTENSIVE REPAIR HAND) 2.5-10 % CREA APPLY TO FEET AT BEDTIME; PLACE SOCKS OVER FEET AFTER APPLICATION IF NEEDED FOR DRY CRACKING FEET 3/15/20   Historical Provider, MD   hydrOXYzine (VISTARIL) 25 MG capsule Take 50 mg by mouth 3 times daily as needed  3/4/20   Historical Provider, MD   Nutritional Supplements ([address]) LIQD take as directed three times a day 6/1/20   Historical Provider, MD   isosorbide dinitrate (ISORDIL) 20 MG tablet Take 1 tablet by mouth 3 times daily 4/28/20   Tamera Renteria MD   nitroGLYCERIN (NITROSTAT) 0.4 MG SL tablet up to max of 3 total doses.  If no relief after 1 dose, call 911. 4/28/20   Tamera Renteria MD   hydrALAZINE (APRESOLINE) 50 MG tablet Take 1 tablet by mouth every 8 hours 4/28/20   Tamera Renteria MD   metoprolol succinate (TOPROL XL) 50 MG extended release tablet Take 1 tablet by mouth 2 times daily 4/28/20   Santino Fine MD Henry   amitriptyline (ELAVIL) 25 MG tablet Take 25 mg by mouth nightly    Historical Provider, MD   haloperidol (HALDOL) 5 MG tablet Take 5 mg by mouth daily    Historical Provider, MD   FreeStyle Lancets MISC 1 each by Does not apply route daily 1/30/20   Deni Sotomayor MD   blood glucose test strips (FREESTYLE LITE) strip 1 each by In Vitro route daily As needed.  1/30/20   Deni Sotomayor MD   folic acid (FOLVITE) 1 MG tablet Take 1 mg by mouth daily    Historical Provider, MD   Iron Polysacch Qjmof-C03-ED (NIFEREX-150 FORTE PO) Take 1 tablet by mouth daily     Historical Provider, MD   Cyanocobalamin (VITAMIN B-12) 1000 MCG extended release tablet Take 1,000 mcg by mouth daily    Historical Provider, MD   meclizine (ANTIVERT) 25 MG tablet Take 25 mg by mouth three times daily    Historical Provider, MD       Scheduled Meds:   sodium chloride flush  5-40 mL IntraVENous 2 times per day    enoxaparin  40 mg SubCUTAneous Daily    metoprolol succinate  50 mg Oral Daily    furosemide  40 mg IntraVENous BID    isosorbide dinitrate  20 mg Oral TID    hydrALAZINE  50 mg Oral TID    insulin glargine  50 Units SubCUTAneous Nightly    insulin lispro  0-12 Units SubCUTAneous TID WC    insulin lispro  0-6 Units SubCUTAneous Nightly    amitriptyline  25 mg Oral Nightly    aspirin  81 mg Oral Daily    atorvastatin  80 mg Oral Nightly    docusate sodium  100 mg Oral BID    folic acid  1 mg Oral Daily    furosemide  40 mg Oral Daily    gabapentin  600 mg Oral BID    haloperidol  5 mg Oral Daily    hydrALAZINE  50 mg Oral 3 times per day    isosorbide dinitrate  20 mg Oral TID    levothyroxine  50 mcg Oral Daily    metoprolol succinate  50 mg Oral BID    montelukast  10 mg Oral Nightly    ranolazine  1,000 mg Oral BID    sertraline  200 mg Oral Daily     Continuous Infusions:   sodium chloride      dextrose       PRN Meds:sodium chloride flush, sodium chloride, ondansetron **OR** ondansetron, polyethylene glycol, acetaminophen **OR** acetaminophen, glucose, dextrose, glucagon (rDNA), dextrose, hydrOXYzine    Allergies   Allergen Reactions    Ambien [Zolpidem Tartrate]     Capoten [Captopril]     Clioquinol     Cogentin [Benztropine]     Depakote [Divalproex Sodium]     Effexor Xr [Venlafaxine Hcl Er]     Geodon [Ziprasidone Hcl]     Lisinopril      Hyperkalemia: 4/21/20 potassium was 6.7    Lyrica [Pregabalin]     Navane [Thiothixene]     Pamelor [Nortriptyline Hcl]     Remeron [Mirtazapine]     Risperdal [Risperidone]     Trazodone And Nefazodone     Wellbutrin [Bupropion]        Social History     Socioeconomic History    Marital status:      Spouse name: Not on file    Number of children: Not on file    Years of education: Not on file    Highest education level: Not on file   Occupational History    Not on file   Tobacco Use    Smoking status: Never Smoker    Smokeless tobacco: Never Used   Vaping Use    Vaping Use: Never used   Substance and Sexual Activity    Alcohol use: Never    Drug use: Never    Sexual activity: Not on file   Other Topics Concern    Not on file   Social History Narrative         Lives With: Spouse    Type of Home: [address] [place] [address] in [address]: One level    Home Access: Elevator    Bathroom Shower/Tub: Tub/Shower unit(Simultaneous filing. User may not have seen previous data.)    Bathroom Equipment: Grab bars in shower, Shower chair    Home Equipment: Rolling walker, [address] 450 bed    ADL Assistance: Needs assistance    Homemaking Assistance: Needs assistance    Homemaking Responsibilities: No    Ambulation Assistance: Independent    Transfer Assistance: Independent    Active : No    Additional Comments: Pt wears orthopedic shoes at baseline    The patient states she is current with [facility](RN per the ).   The patient had a walker, but obtained a hospital bed, wheel chair, BSC and O2 last admission (from [address]). pharmacy is [address] [address] [place]. Transportation is provided by [employer] () per the patient     Social Determinants of Health     Financial Resource Strain:     Difficulty of Paying Living Expenses: Not on file   Food Insecurity:     Worried About [facility] in the Last Year: Not on file    Shayy of Food in the Last Year: Not on file   Transportation Needs:     Lack of Transportation (Medical): Not on file    Lack of Transportation (Non-Medical): Not on file   Physical Activity:     Days of Exercise per Week: Not on file    Minutes of Exercise per Session: Not on file   Stress:     Feeling of Stress : Not on file   Social Connections:     Frequency of Communication with Friends and Family: Not on file    Frequency of Social Gatherings with Friends and Family: Not on file    Attends Religion Services: Not on file    Active Member of [address] [place] [address] or Organizations: Not on file    Attends Club or Organization Meetings: Not on file    Marital Status: Not on file   Intimate Partner Violence:     Fear of Current or Ex-Partner: Not on file    Emotionally Abused: Not on file    Physically Abused: Not on file    Sexually Abused: Not on file   Housing Stability:     Unable to Pay for Housing in the Last Year: Not on file    Number of [place] in the Last Year: Not on file    Unstable Housing in the Last Year: Not on file       No family history on file. Review Of Systems:    14 point ROS negative other than mentioned.      Physical Exam:    CURRENT VITALS: /85   Pulse 70   Temp 98.1 °F (36.7 °C)   Resp 18   Ht 5' 4\" (1.626 m)   Wt 232 lb (105.2 kg)   SpO2 100%   BMI 39.82 kg/m²     CONSTITUTIONAL:  awake, alert, cooperative, no apparent distress,   ENT:  Normocephalic, without obvious abnormality, atraumatic, sinuses nontender on palpation, external ears without lesions,  NECK: Supple, symmetrical, trachea midline, no adenopathy, thyroid symmetric, not enlarged and no tenderness, skin normal, No bruits. LUNGS:  No increased work of breathing, good air exchange, clear to auscultation bilaterally, no crackles, no wheezing. Reproducible Chest Pain with palpation. CARDIOVASCULAR:  Decreeased apical impulse, regular rate and rhythm, normal S1 and S2,  2/6 Systolic murmur noted. ABDOMEN:  Obese, normal bowel sounds, soft, non-distended, non-tender, no masses palpated, no hepatosplenomegally  EXTREMETIES: No edema, Pulses Strong Thruout. No ulcers. NEUROLOGIC:  Awake, alert, oriented to name, place and time. Following all commands and moving all extremties.   SKIN:  no bruising or bleeding, normal skin color, texture, turgor and no rashes    Labs:  Recent Results (from the past 24 hour(s))   Comprehensive Metabolic Panel    Collection Time: 11/13/21  7:45 PM   Result Value Ref Range    Sodium 134 (L) 135 - 144 mEq/L    Potassium 4.4 3.4 - 4.9 mEq/L    Chloride 99 95 - 107 mEq/L    CO2 25 20 - 31 mEq/L    Anion Gap 10 9 - 15 mEq/L    Glucose 73 70 - 99 mg/dL    BUN 32 (H) 8 - 23 mg/dL    CREATININE 1.34 (H) 0.50 - 0.90 mg/dL    GFR Non-African American 39.8 (L) >60    GFR  48.2 (L) >60    Calcium 9.0 8.5 - 9.9 mg/dL    Total Protein 8.0 6.3 - 8.0 g/dL    Albumin 3.8 3.5 - 4.6 g/dL    Total Bilirubin 0.4 0.2 - 0.7 mg/dL    Alkaline Phosphatase 104 40 - 130 U/L    ALT 27 0 - 33 U/L    AST 41 (H) 0 - 35 U/L    Globulin 4.2 (H) 2.3 - 3.5 g/dL   CBC Auto Differential    Collection Time: 11/13/21  7:45 PM   Result Value Ref Range    WBC 10.2 4.8 - 10.8 K/uL    RBC 4.15 (L) 4.20 - 5.40 M/uL    Hemoglobin 10.6 (L) 12.0 - 16.0 g/dL    Hematocrit 31.6 (L) 37.0 - 47.0 %    MCV 76.2 (L) 82.0 - 100.0 fL    MCH 25.6 (L) 27.0 - 31.3 pg    MCHC 33.6 33.0 - 37.0 %    RDW 17.2 (H) 11.5 - 14.5 %    Platelets 975 653 - 112 K/uL    Neutrophils % 66.6 %    Lymphocytes % 21.8 %    Monocytes % 9.1 % Eosinophils % 1.7 %    Basophils % 0.8 %    Neutrophils Absolute 6.8 (H) 1.4 - 6.5 K/uL    Lymphocytes Absolute 2.2 1.0 - 4.8 K/uL    Monocytes Absolute 0.9 (H) 0.2 - 0.8 K/uL    Eosinophils Absolute 0.2 0.0 - 0.7 K/uL    Basophils Absolute 0.1 0.0 - 0.2 K/uL   Magnesium    Collection Time: 11/13/21  7:45 PM   Result Value Ref Range    Magnesium 1.9 1.7 - 2.4 mg/dL   Lactic Acid, Plasma    Collection Time: 11/13/21  7:45 PM   Result Value Ref Range    Lactic Acid 0.8 0.5 - 2.2 mmol/L   Troponin    Collection Time: 11/13/21  7:45 PM   Result Value Ref Range    Troponin 0.038 (HH) 0.000 - 0.010 ng/mL   CK    Collection Time: 11/13/21  7:45 PM   Result Value Ref Range    Total CK 78 0 - 170 U/L   Brain Natriuretic Peptide    Collection Time: 11/13/21  7:45 PM   Result Value Ref Range    Pro-BNP 11,159 pg/mL   PROCALCITONIN    Collection Time: 11/13/21  7:45 PM   Result Value Ref Range    Procalcitonin 0.05 0.00 - 0.15 ng/mL   EKG 12 Lead - Chest Pain    Collection Time: 11/13/21  7:51 PM   Result Value Ref Range    Ventricular Rate 70 BPM    Atrial Rate 70 BPM    P-R Interval 190 ms    QRS Duration 108 ms    Q-T Interval 384 ms    QTc Calculation (Bazett) 414 ms    P Axis 56 degrees    R Axis 65 degrees    T Axis 199 degrees   APTT    Collection Time: 11/13/21  8:00 PM   Result Value Ref Range    aPTT 32.4 24.4 - 36.8 sec   Protime-INR    Collection Time: 11/13/21  8:00 PM   Result Value Ref Range    Protime 15.2 (H) 12.3 - 14.9 sec    INR 1.2    POCT CREATININE    Collection Time: 11/13/21  8:13 PM   Result Value Ref Range    POC CREATININE WHOLE BLOOD 1.5    COVID-19 & Influenza Combo    Collection Time: 11/13/21  9:41 PM    Specimen: Nasopharyngeal Swab   Result Value Ref Range    SARS-CoV-2 RNA, RT PCR NOT DETECTED NOT DETECTED    INFLUENZA A NOT DETECTED NOT DETECTED    INFLUENZA B NOT DETECTED NOT DETECTED   POCT Arterial    Collection Time: 11/13/21  9:54 PM   Result Value Ref Range    POC Sodium 136 136 - 145 mEq/L    POC Potassium 3.7 3.5 - 5.1 mEq/L    POC Chloride 102 99 - 110 mEq/L    POC Glucose 66 60 - 115 mg/dl    POC Creatinine 1.5 (H) 0.6 - 1.2 mg/dL    GFR Non-African American 35 (A) >60    GFR  42 (A) >60    Calcium, Ion 1.16 1.12 - 1.32 mmol/L    pH, Arterial 7.430 7.350 - 7.450    pCO2, Arterial 39 35 - 45 mm Hg    pO2, Arterial 126 (HH) 75 - 108 mm Hg    HCO3, Arterial 25.7 21.0 - 29.0 mmol/L    Base Excess, Arterial 1 -3 - 3    O2 Sat, Arterial 99 (HH) 93 - 100 %    TCO2, Arterial 27 22 - 29    Lactate 0.71 0.40 - 2.00 mmol/L    POC Hematocrit 30 (L) 36 - 48 %    Hemoglobin 10.2 (L) 12.0 - 16.0 gm/dL    FIO2 4.000     Sample Type ART     Performed on SEE BELOW    Troponin    Collection Time: 11/14/21  2:29 AM   Result Value Ref Range    Troponin 0.035 (HH) 0.000 - 0.010 ng/mL   Comprehensive Metabolic Panel    Collection Time: 11/14/21  2:29 AM   Result Value Ref Range    Sodium 134 (L) 135 - 144 mEq/L    Potassium 3.8 3.4 - 4.9 mEq/L    Chloride 98 95 - 107 mEq/L    CO2 24 20 - 31 mEq/L    Anion Gap 12 9 - 15 mEq/L    Glucose 67 (L) 70 - 99 mg/dL    BUN 33 (H) 8 - 23 mg/dL    CREATININE 1.30 (H) 0.50 - 0.90 mg/dL    GFR Non- 41.2 (L) >60    GFR  49.9 (L) >60    Calcium 9.0 8.5 - 9.9 mg/dL    Total Protein 7.3 6.3 - 8.0 g/dL    Albumin 3.5 3.5 - 4.6 g/dL    Total Bilirubin 0.5 0.2 - 0.7 mg/dL    Alkaline Phosphatase 96 40 - 130 U/L    ALT 25 0 - 33 U/L    AST 28 0 - 35 U/L    Globulin 3.8 (H) 2.3 - 3.5 g/dL   Iron and TIBC    Collection Time: 11/14/21  2:29 AM   Result Value Ref Range    Iron 42 37 - 145 ug/dL    TIBC 254 178 - 450 ug/dL    Iron Saturation 17 11 - 46 %   POCT Glucose    Collection Time: 11/14/21  5:36 AM   Result Value Ref Range    POC Glucose 82 60 - 115 mg/dl    Performed on ACCU-CHEK    Troponin    Collection Time: 11/14/21  8:04 AM   Result Value Ref Range    Troponin 0.024 (HH) 0.000 - 0.010 ng/mL   POCT Glucose    Collection Time: 11/14/21 11:19 AM   Result Value Ref Range    POC Glucose 228 (H) 60 - 115 mg/dl    Performed on ACCU-CHEK        ECG:     Normal sinus rhythm  Low voltage QRS  Incomplete right bundle branch block  Septal infarct , age undetermined  ST & T wave abnormality, consider inferolateral ischemia  Abnormal ECG         Jens Overall, MD  [facility] Cardiologist      Electronically signed on 11/14/21 at 12:46 PM EST      -----  Last [facility] OFFICE Note:      Subjective  PCP: [facility]   Subjective:   07/14/2021 - [place] DAMIEN Nguyen was seen in cardiac evaluation at the [facility] office 07/14/2021. The patients problems are listed in the impression below. Electronic medical records reviewed. Patient returns. She feels well currently. She no longer has any chest pain. She is asymptomatic. Patient denies Chest Pain, SOB, Lightheadedness, Dizziness, TIA or CVA symptoms. No CHF or Edema. No Palpitations. No GI,  or Bleeding Issues. No Recent Fever or Chills. Cardiovascular and general review of systems is otherwise negative. A 14-system review is otherwise negative, other than noted. ELECTROCARDIOGRAM: Sinus rhythm, poor wave enter progression, first-degree AV block. Rate 66. CARDIAC TESTING: None    LABORATORY DATA: Cholesterol 122, triglyceride 118, HDL 47, LDL 51. Chem-7, CBC, liver function is normal except for hemoglobin 7.5, creatinine 1.2, GFR 44, magnesium 1.9. Otherwise as noted below. All above testing was personally reviewed. PHYSICAL EXAMINATION:   General: No acute distress. Vital signs as noted. Alert and oriented. Head And Neck Examination: No jugular venous distention, no carotid bruits, no mass. Carotid upstrokes preserved. Oral mucosa moist. No xanthelasma. Head and neck examination otherwise unremarkable. Lungs: Clear to auscultation and percussion. No wheezes, no rales, and no rhonchi.   Chest: Excursion appeared to be normal. No chest wall tenderness on palpation. Lateral incisional scar. Heart: Normal S1 and S2. No S3. No S4. No rub. Grade 2/6 systolic murmur, best heard at the left sternal border. Point of maximal impulse was within normal limits. Abdomen: Soft. Nontender. No organomegaly. No bruits. No masses. Obese. Extremities: No bipedal edema. No clubbing. No cyanosis. Pulses are diminished throughout. No bruits. Right lower extremity foot/toe infection. Musculoskeletal Exam: No ulcers, otherwise unremarkable. Neuro: Neurologically appeared grossly intact. IMPRESSION:     Cardiovascular status stable  Chest pain, reproducible, musculoskeletal, noncardiac, resolved. CAD post CABG June 2019 [facility], LIMA to LAD, PCI to left circumflex and right coronary artery, last April 2020. Post catheterization April 2021, medical treatment advised. Ischemic cardiomyopathy with apical hypokinesia LVEF 50%. PVOD post right lower extremity revascularization,  and [facility] Dr Ani Naranjo, (details not available), Right third toe amputation site infection. Carotid artery disease with ultrasound noted 50-69% bilateral stenoses May 2019. Syncope, presyncope  History of heart failure  Hypothyroidism  Hypertension  Hyperlipidemia  Diabetes  Depression  Schizophrenia  Family history of coronary artery disease  Multiple allergies as noted  Otherwise as per assessment below. RECOMMENDATIONS:     Patient continues to do well overall. Would suggest that she continue her current medications. Refills were provided. She did discussed possible bariatric surgery and from a cardiovascular standpoint she be acceptable candidate with low cardiovascular risk. Exercise dietary weight reduction program was encouraged. Hydration. Follow [employer] health portal was encouraged. We will plan to see back in 6 months with Laboratory Studies and ECG as noted below. Patient will follow up with their primary physician for general care.   The patient knows to contact medical care earlier if need be. Tamera Bacon MD, [address] [facility] Cardiology      Of Note:  [employer] voice recognition dictation software was utilized partially in the preparation of this note, therefore, inaccuracies in spelling, word choice and punctuation may have occurred which were not recognized the time of signing. Chief Complaint  Cardiology Reason for Visit_NOH: 3M EKG        Active Problems   1. Abnormal EKG (794.31) (R94.31)   2. CAD in native artery (414.01) (I25.10)   · Post CABG LIMA LAD 6/19  DTW,      post LCX PCI 4/20.   3. Carotid atherosclerosis (433.10) (I65.29)   · [place] [employer] 50-69% Dz 6/19   4. CKD (chronic kidney disease), stage III (585.3) (N18.30)   5. Depression (311) (F32.9)   6. Diabetes (250.00) (E11.9)   7. HTN (hypertension) (401.9) (I10)   8. Hyperlipidemia (272.4) (E78.5)   9. Hypothyroidism (244.9) (E03.9)   10. Ischemic cardiomyopathy (414.8) (I25.5)   · 50% with ANT Moderate HK. 11. Mitral regurgitation (424.0) (I34.0)   · 3+   12. Morbid obesity (278.01) (E66.01)   13. Never a smoker   14. PVD (peripheral vascular disease) (443.9) (I73.9)   · Post RIght Bypass 6/19  DTW   15. Schizo-affective psychosis (295.70) (F25.9)    Family History   1. Family history of diabetes mellitus (V18.0) (Z83.3) : Mother, Sister   2. Family history of Hodgkin's lymphoma (V16.7) (Z80.7) : Father   3. Family history of hypertension (V17.49) (Z82.49) : Mother, Father, Sister, Brother   4. Family history of myocardial infarction (V17.3) (Z82.49) : Father   5. Family history of Lupus : Sister    Social History   · Daily caffeine consumption, 1 serving a day   · Never a smoker   · No alcohol use   · No illicit drug use    Surgical History   1. History of Toe amputation   2. History of Tooth extraction    Current Meds   1. Albuterol Sulfate  MCG/ACT AERS; INHALE 1 PUFF EVERY 4 HOURS AS   NEEDED; Therapy: (Zulema Nur) to Recorded   2.  Ammonium Lactate 12 % External Lotion; APPLY  AND RUB  IN A THIN FILM TO   AFFECTED AREAS TWICE DAILY. (AM AND PM); Therapy: 12Jan2021 to Recorded   3. Artificial Tears 1.4 % Ophthalmic Solution; INSTILL 1-2 DROPS INTO AFFECTED EYE(S)    4 TIMES DAILY AS DIRECTED; Therapy: 95EWZ6818 to Recorded   4. Aspirin 81 MG Oral Tablet Delayed Release; TAKE 1 TABLET DAILY  Requested for:   24Feb2021; Last Rx:24Feb2021 Ordered   5. Atorvastatin Calcium 80 MG Oral Tablet; TAKE 1 TABLET AT BEDTIME; Therapy: 24Feb2021 to (Evaluate:10Umf4306)  Requested for: 24Feb2021; Last   Rx:24Feb2021 Ordered   6. Brilinta 90 MG Oral Tablet; TAKE 1 TABLET TWICE DAILY; Therapy: 28Apr2020 to (Evaluate:37Dkn8579)  Requested for: 24Feb2021; Last   Rx:24Feb2021 Ordered   7. Buprenorphine 7.5 MCG/HR Transdermal Patch Weekly; Therapy: 11LIS1921 to Recorded   8. Diclofenac Sodium 1 % External Gel; APPLY SPARINGLY TO AFFECTED AREA(S) ONCE   DAILY; Therapy: 85RJS6490 to Recorded   9.  MG Oral Capsule; take 1 cap daily; Therapy: 29FEV1910 to Recorded   10. Doxepin HCl - 10 MG Oral Capsule; TAKE 1 TO 2 CAPSULES AT BEDTIME AS NEEDED    FOR ITCHING; Therapy: 35XOI1727 to Recorded   11. Furosemide 40 MG Oral Tablet; Take one tab daily; Therapy: 27Yoy5574 to (Evaluate:47Viv8688)  Requested for: 24Feb2021; Last    Rx:24Feb2021 Ordered   12. Gabapentin 400 MG Oral Capsule; 1 Cap in the morning 2 at Night; Therapy: (Recorded:61Ocm8687) to Recorded   13. Haloperidol 5 MG Oral Tablet; TAKE 1 TABLET TWICE DAILY; Therapy: (Recorded:04Nrr7037) to Recorded   14. hydrALAZINE HCl - 50 MG Oral Tablet; TAKE 1 TABLET 3 TIMES DAILY; Therapy: 48Esc8033 to (Evaluate:57Gse7716)  Requested for: 24Feb2021; Last    Rx:24Feb2021 Ordered   15. hydrOXYzine HCl - 25 MG Oral Tablet; TAKE 1 TABLET 3 TIMES DAILY AS NEEDED; Therapy: (Lendon Cabot) to Recorded   16. Isosorbide Dinitrate 20 MG Oral Tablet; TAKE 1 TABLET BY MOUTH EVERY 8 HOURS;     Therapy: 61Qvw5328 to (Evaluate:13Mzs7817)  Requested for: 92Yav7001; Last    Rx:87Rrw0011 Ordered   17. Lantus 100 UNIT/ML Subcutaneous Solution; INJECT SUBCUTANEOUSLY AS    DIRECTED; Therapy: (Milton Velazco) to Recorded   18. Levothyroxine Sodium 50 MCG Oral Tablet; 1 TABLET DAILY; Therapy: 14Fkh1668 to Recorded   19. Meclizine HCl - 25 MG Oral Tablet; TAKE 1 TABLET 3 TIMES DAILY AS NEEDED; Therapy: (Milton Velazco) to Recorded   20. Metoprolol Succinate ER 50 MG Oral Tablet Extended Release 24 Hour; TAKE 1 TABLET    TWICE A DAY  Requested for: 57GBS2099; Last Rx:01Bhi6358 Ordered   21. Montelukast Sodium 10 MG Oral Tablet; TAKE 1 TABLET AT BEDTIME; Therapy: (Milton Velazco) to Recorded   22. Nitroglycerin 0.4 MG Sublingual Tablet Sublingual; PLACE 1 TABLET UNDER THE    TONGUE EVERY 5 MINUTES FOR UP TO 3 DOSES AS NEEDED FOR CHEST    PAIN. CALL 911 IF PAIN PERSISTS  Requested for: 39CNL5208; Last Rx:84Fnf4565    Ordered   23. Prazosin HCl - 2 MG Oral Capsule; TAKE 1 CAPSULE EVERY 12 HOURS DAILY; Therapy: (Milton Velazco) to Recorded   24. Ranolazine  MG Oral Tablet Extended Release 12 Hour; TAKE 1 TABLET EVERY    12 HOURS; Therapy: 90MIR5272 to Recorded   25. Restasis 0.05 % Ophthalmic Emulsion; INSTILL 1 DROP IN [facility] EYE TWICE DAILY; Therapy: 29CLF1992 to Recorded   26. Sertraline HCl - 100 MG Oral Tablet; TAKE 2 TABLETS DAILY; Therapy: (Milton Velazco) to Recorded   27. Trulicity 8.17 PU/9.9ZJ Subcutaneous Solution Pen-injector; Inject 0.5Ml subcu nightly; Therapy: 11DAD3478 to Recorded    Reviewed meds with PT list.DS MA     Allergies   1. Ambien TABS   nervousness; Insomnia; Recorded By: Mirlande Perry; 04/17/2019 3:02:20 PM   2. Capoten TABS   Cough; Palpitations; Recorded By: Mirlande Perry; 04/17/2019 3:02:20 PM   3. Cogentin   Palpitations; Recorded By: Mirlande Perry; 04/17/2019 3:02:20 PM   4. Geodon CAPS   vision problems; Sleeplessness;  Recorded By: Shazia Clifford, Linsey; 04/17/2019 3:02:20 PM   5. KlonoPIN TABS   Palpitations; Recorded By: Gayl Camera; 04/17/2019 3:02:20 PM   6. Navane CAPS   Headache; Recorded By: Gayl Camera; 04/17/2019 3:02:20 PM   7. Remeron   Nausea; Vomiting; Recorded By: Gayl Camera; 04/17/2019 3:02:20 PM   8. RisperDAL TABS   Nausea; Recorded By: Gayl Camera; 04/17/2019 3:02:20 PM   9. SEROquel TABS   Palpitations; Recorded By: Gayl Camera; 04/17/2019 3:02:20 PM   10. Abilify   Recorded By: Shawn Olivia; 11/08/2019 11:02:43 AM   11. Depakote ER TB24   unsteady, falls; Recorded By: Gayl Camera; 04/17/2019 3:02:20 PM   12. Effexor TABS   patient states she felt high on medication;  Recorded By: Gayl Camera; 04/17/2019 3:02:20 PM    Immunizations  FLU --- Rafita Danas: [id number] Jumbo: 01-Oct-2020   PCV --- Series1: 01-Oct-2019     Vitals  Vital Signs   Recorded: 64THF9378 01:59PM   Heart Rate: 66  Recorded: 28EZE3073 01:46PM   Height: 5 ft 1 in  Weight: 240 lb   BMI Calculated: 45.35 kg/m2  BSA Calculated: 2.04  Blood Pressure: 122 / 78, RUE, Sitting  Falls Screening: No falls within the past year    Results/Data  Complete Blood Count 87Znv5934 10:46AM [facility]     Test Name Result Flag Reference   [facility] Blood Cells 7.5 K/uL  4.8-10.8   RBC 4.77 M/uL  4.20-5.40   HGB 12.3 g/dL  12.0-16.0   HCT 36.0 % L 37.0-47.0   MCV 75.5 fL L 82.0-100.0   MCH 25.8 pg L 27.0-31.3   MCHC 34.2 %  33.0-37.0   Platelet Count 694 K/uL  130-400   RDW 16.8 % H 11.5-14.5     Hemoglobin A1C 72Ntv9955 10:46AM [facility]     Test Name Result Flag Reference   Hemoglobin A1C 7.5 % H 4.8-5.9     Basic Metabolic Profile 77ESE3444 10:46AM [facility]     Test Name Result Flag Reference   Sodium 136 mEq/L  135-144   Potassium 4.7 mEq/L  3.4-4.9   Chloride 99 mEq/L     Co2 26 mEq/L  20-31   BUN 18 mg/dL  8-23   Creatinine 1.22 mg/dL H 0.50-0.90   Calcium 9.7 mg/dL  8.5-9.9   Glucose 177 mg/dL H 70-99   GAP 11 mEq/L  9-15   Glomerular Filtration Ratio 44.4 L >60   Glomerular Filtration Ratio_AA 53.8 L >60     Lipid Panel 93TTO8189 10:46AM [facility]     Test Name Result Flag Reference   LDL Cholesterol (Calculat 51 mg/dL  0-129   Triglycerides 118 mg/dL  0-150   Cholesterol 122 mg/dL  0-199   HDL Cholesterol 47 mg/dL  40-59     Magnesium 97Pmp4893 10:46AM [facility]     Test Name Result Flag Reference   Magnesium 1.9 mg/dL  1.7-2.4     TSH 38Jjl7745 10:46AM [facility]     Test Name Result Flag Reference   TSH 1.470 uIU/mL  0.440-3.860     LIVER PANEL 88Hir5821 10:46AM [facility]     Test Name Result Flag Reference   ALKALINE PHOSPHATASE 100 U/L     AST 18 U/L  0-35   ALT 14 U/L  0-33   BILIRUBIN,TOTAL 0.3 mg/dL  0.2-0.7   Bilirubin, Direct <0.2 mg/dL  0.0-0.4   PROTEIN, TOTAL 7.2 g/dL  6.3-8.0   ALBUMIN 3.7 g/dL  3.5-4.6   Bilirubin, Indirect see below mg/dL  0.0-0.6       Past Medical History   1. History of Foot ulcer, right (707.15) (L97.519)   2. H/O shortness of breath (V13.89) (Z87.898)   3. History of angina pectoris (V12.59) (Z86.79)   4. History of chest pain (V13.89) (Z87.898)   5. History of chest pain (V13.89) (Z87.898)   6. History of intermittent claudication (V12.50) (Z86.79)   7. History of palpitations (V12.59) (Z87.898)   8. History of Lightheadedness (780.4) (R42)   9. History of Preoperative cardiovascular examination (V72.81) (Z01.810)    Procedure    EKG done in office today; :   .     Assessment   1. CAD in native artery (414.01) (I25.10)   2. Carotid atherosclerosis (433.10) (I65.29)   3. Ischemic cardiomyopathy (414.8) (I25.5)   4. Mitral regurgitation (424.0) (I34.0)   5. PVD (peripheral vascular disease) (443.9) (I73.9)   6. HTN (hypertension) (401.9) (I10)   7. Hyperlipidemia (272.4) (E78.5)   8. Hypothyroidism (244.9) (E03.9)   9. Diabetes (250.00) (E11.9)   10. CKD (chronic kidney disease), stage III (585.3) (N18.30)   11. Abnormal EKG (794.31) (R94.31)   12.  Depression (311) (F32.9)   13. Morbid obesity (278.01) (E66.01)    Plan   1. Renew: Ranolazine  MG Oral Tablet Extended Release 12 Hour; TAKE 1 TABLET   EVERY 12 HOURS   2. Basic Metabolic Panel; Status:Active; Requested for:50Gsl2030;   Call if Potassium result is <3.5 or >5.0 : YES   3. CBC; Status:Active; Requested for:40Cut5106;    4. EKG at next office visit.; Status:Active; Requested for:45Sdp5670;    5. Encouraged regular exercise,improved dietary habits.; Status:Complete;   Done:   61LNZ7758   6. Hemoglobin A1C; Status:Active; Requested for:00Bvv9346;    7. Lifestyle education regarding diet; Status:Complete;   Done: 03WSJ4695   8. Lipid Panel w/Reflex LDL; Status:Active; Requested for:80Qqy9483;    9. LIVER PANEL; Status:Active; Requested for:88Aoc4628;    10. Magnesium; Status:Active; Requested for:46Ptl0915;    11. TSH; Status:Active; Requested for:59Jpc9989;    12. 6 month follow-up/call if interval problems. Evaluation and Treatment  Follow-up  Status:    Active  Requested for: 25RCW4313   71. Follow up per family physician. Evaluation and Treatment  Follow-up  Status: Active     Requested for: 62UEB9223   75. Access your medical record, request prescriptions, view laboratory and testing done in    our office, request appointments, view immunization records and message your provider    through our safe and secure patient portal. Ask a staff member how    to register or visit us at [email or web address] to get started today.;    Status:Complete;   Done: 95PCU8063   15. Begin or continue regular aerobic exercise. Gradually work up to at least 3 sessions of    30 minutes of exercise a week.; Status:Active; Requested for:16Qnt0704;    16. Continue same medications/treatment.; Status:Active; Requested for:80Cww0306;    17. Diets that are low in carbohydrates and high in protein are very popular for weight loss.;    Status:Active; Requested for:78Xhm1662;    18. Drink plenty of fluids.; Status:Active;  Requested for:61Coh9364;    19. Please bring all medicines, vitamins, and herbal supplements with you when you come    to the office.; Status:Active; Requested for:89Hfe2912;    20. Please bring any lab results from other providers/physicians to your next appointment.;    Status:Active; Requested for:31Nrn5092;    21. Prescriptions will not be filled unless you are compliant with your follow up appointments    or have a follow up appointments scheduled as per the instruction of your physician. Refills for medications should be requested at the time of your office visit.; Status:Active; Requested for:53Wti3717;    22. Thank you for being a patient at [facility]. Our goal is to    provide high quality medical care. Following today's visit, please check your email for an    invitation to complete our patient satisfaction survey. By sharing your feedback, you will    help us continue our mission to provide excellent medical care. Your participation in the    survey is voluntary and completely confidential. We appreciate your thoughts regarding    today's visit.; Status:Active; Requested for:54Dyo6543;    23.  Tobacco use Hx Reported; Status:Complete;   Done: 40EQI4355    Signatures  Electronically signed by : Devi Douglas MA; Jul 14 2021  1:48PM EST                        Electronically signed by : Mahamed Pearl, ; Jul 14 2021  2:01PM EST                        Electronically signed by : Jayne Rasmussen LPN; Jul 14 0471  8:40PO EST                        Electronically signed by : Pantera Mcmahon M.D.; Jul 22 2021 11:38PM EST

## 2021-11-15 NOTE — PROGRESS NOTES
[address] [facility] Cardiology Progress Note        Date:   11/15/2021    Patient:    Alice Ramon    :    1955  CSN:    [id number]    Rounding Cardiologist: Cecilia Lamar MD     Primary Cardiologist: Roque Muniz MD [facility]    Requesting Physician:  Jeb Villatoro DO      Reason for Initial Consult:  SOB, Chest pain, possible CHF    Subjective:    No Changes over nite. Less SOB. Lying flat with Orthopnea or distress. No Edema. 14 system General and Cardiac ROS otherwise negative or unchanged. Assessment:    1. SOB  2. Chest discomfort, chronic, reproducible, musculoskeletal  3. Elevated troponins, low flat pattern, not acute true myocardial infarction, due to demand type 2 MI.  4. CHF Systollic, Acute on Chronic. 5. Coronary artery disease, post prior CABG, prior left main left circumflex stent, known 100% right coronary occlusion chronic, last cath  with patent stent and bypass, medical treatment. 6. Ischemic Cardiomyopathy, with anterior hypokinesis, LVEF 35-40% by echo, 56% by Myoview .  7. Negative Lexiscan Myoview stress test for significant MI or Ischemia, Normal LV function, LVEF 56%, 21. 8. Abnormal electrocardiogram with first-degree AV block and anterior MI.  9. Right bundle branch block, new, alternating, now resolved. 10. Hypertension  11. Hyperlipidemia  12. Renal insufficiency, chronic  13. Anemia  14. Pulmonary hypertension  15. Peripheral vascular disease, history of right toe amputation. 16. Schizoaffective disorder  17. Family history of CAD    Plan:    1. Cardiac Supportive Care  2. Continue prior Medications. 3. OK to DC home form CV point. 4. Follow up with [address] with eventual outpatient stress test if symptoms persist.  5. Further Recommendations to follow  6.  See Orders       HISTORY OF PRESENT ILLNESS:      Alice Ramon is a pleasant 61 y.o. female who presented with the above past cardiac history now with worsening SOB and Chest Pain. Has increased troponins and cardiac markers. Cardiology asked to see in Consultation. Patient History and Records, EMR reviewed. Patient Interviewed and examined. Bangladeshi speaking,  present. Denies LH, Dizziness, TIA or CVA Symptoms. No Palpitations. No Syncope. No Fever, Chills or Cold symptoms. No GI,  or Bleeding complaints. Cardiac and general ROS otherwise negative. [address] otherwise negative other than noted. Past Medical History:   Diagnosis Date    Asthma     CAD (coronary artery disease)     CKD (chronic kidney disease) stage 3, GFR 30-59 ml/min ([facility])     Colitis     [facility]     Hyperlipidemia     Hypertension     PAD (peripheral artery disease) ([facility])     Prolonged emergence from general anesthesia     PVD (peripheral vascular disease) ([facility]     Thyroid disease        Past Surgical History:   Procedure Laterality Date    CARDIAC SURGERY      CATARACT REMOVAL WITH IMPLANT Bilateral 11- 11-     SECTION      COLONOSCOPY N/A 2019    COLONOSCOPY DIAGNOSTIC performed by Sunny Essex, MD at [address] [facility] GRAFT  2019    unknown vessels    HYSTERECTOMY      TOE AMPUTATION Right     3rd toe       Prior to Admission medications    Medication Sig Start Date End Date Taking? Authorizing Provider   oxyCODONE-acetaminophen (PERCOCET) 5-325 MG per tablet Take 1 tablet by mouth every 4 hours as needed for Pain. Yes Historical Provider, MD   Dulaglutide (TRULICITY) 8.79 KK/4.7BU SOPN Inject 0.75 mg into the skin once a week  Patient taking differently: Inject 0.75 mg into the skin once a week On .  21  Yes Shanti Orr MD   levothyroxine (SYNTHROID) 50 MCG tablet take 1 tablet by mouth once daily 21  Yes Shanti Orr MD   insulin lispro, 1 Unit Dial, (HUMALOG KWIKPEN) 100 UNIT/ML SOPN 15  units at each meals 21  Yes Shanti Orr MD atorvastatin (LIPITOR) 80 MG tablet take 1 tablet by mouth at bedtime 8/6/21  Yes Historical Provider, MD   diclofenac sodium (VOLTAREN) 1 % GEL apply 4 grams to affected area three times a day AS NEEDED FOR PAIN 8/22/21  Yes Historical Provider, MD   doxepin (SINEQUAN) 25 MG capsule 75 mg nightly  8/6/21  Yes Historical Provider, MD   insulin glargine (LANTUS SOLOSTAR) 100 UNIT/ML injection pen 50 units at bedtime 9/9/21  Yes Gareth Last MD   ranolazine (RANEXA) 1000 MG extended release tablet Take 1 tablet by mouth 2 times daily 8/25/21  Yes Guadalupe Farris MD   gabapentin (NEURONTIN) 600 MG tablet Take 600 mg by mouth 2 times daily. Yes Historical Provider, MD   albuterol sulfate HFA (VENTOLIN HFA) 108 (90 Base) MCG/ACT inhaler Inhale 2 puffs into the lungs as needed for Wheezing Every 4-6 hours PRN 6/29/21  Yes Dianna Clark MD   albuterol (PROVENTIL) (2.5 MG/3ML) 0.083% nebulizer solution Take 3 mLs by nebulization every 6 hours as needed for Wheezing 6/29/21 11/14/21 Yes Dianna Clark MD   montelukast (SINGULAIR) 10 MG tablet Take 1 tablet by mouth nightly 6/29/21  Yes Dianna Clark MD   Alcohol Swabs (ALCOHOL PREP) 70 % PADS qid 3/24/21  Yes Gareth Last MD   RESTASIS 0.05 % ophthalmic emulsion  3/8/21  Yes Historical Provider, MD   ARTIFICIAL TEARS 1.4 % ophthalmic solution  3/8/21  Yes Historical Provider, MD   docusate sodium (COLACE, DULCOLAX) 100 MG CAPS Take 100 mg by mouth 2 times daily  Patient taking differently: Take 200 mg by mouth daily At bedtime.  10/3/20  Yes Tyrone Andre MD   sertraline (ZOLOFT) 100 MG tablet Take 200 mg by mouth daily  9/14/20  Yes Historical Provider, MD   Continuous Blood Gluc Sensor (FREESTYLE FELY 14 DAY SENSOR) MISC Every 2 weeks 9/21/20  Yes Gareth Last MD   Continuous Blood Gluc  (FREESTYLE FELY 14 DAY READER) CATHLEEN As directed 9/21/20  Yes Gareth Last MD   furosemide (LASIX) 40 MG tablet Take 1 tablet by mouth daily 9/4/20  Yes Tonya Jett Bao Marie MD   ticagrelor (BRILINTA) 90 MG TABS tablet Take 90 mg by mouth 2 times daily   Yes Historical Provider, MD   CPAP Machine MISC by Does not apply route New CPAP with 10 cm 8/20/20  Yes Yvno Martino MD   aspirin 81 MG EC tablet Take 1 tablet by mouth daily 6/30/20  Yes Alberto Key MD   Emollient (EUCERIN INTENSIVE REPAIR HAND) 2.5-10 % CREA APPLY TO FEET AT BEDTIME; PLACE SOCKS OVER FEET AFTER APPLICATION IF NEEDED FOR DRY CRACKING FEET 3/15/20  Yes Historical Provider, MD   hydrOXYzine (VISTARIL) 25 MG capsule Take 50 mg by mouth 3 times daily as needed  3/4/20  Yes Historical Provider, MD   Nutritional Supplements ([address]) LIQD take as directed three times a day 6/1/20  Yes Historical Provider, MD   isosorbide dinitrate (ISORDIL) 20 MG tablet Take 1 tablet by mouth 3 times daily 4/28/20  Yes Alberto Key MD   nitroGLYCERIN (NITROSTAT) 0.4 MG SL tablet up to max of 3 total doses. If no relief after 1 dose, call 911. 4/28/20  Yes Alberto eKy MD   hydrALAZINE (APRESOLINE) 50 MG tablet Take 1 tablet by mouth every 8 hours 4/28/20  Yes Alberto Key MD   metoprolol succinate (TOPROL XL) 50 MG extended release tablet Take 1 tablet by mouth 2 times daily 4/28/20  Yes Alberto Key MD   haloperidol (HALDOL) 5 MG tablet Take 5 mg by mouth daily   Yes Historical Provider, MD   blood glucose test strips (FREESTYLE LITE) strip 1 each by In Vitro route daily As needed.  1/30/20  Yes [facility], MD   meclizine (ANTIVERT) 25 MG tablet Take 25 mg by mouth 2 times daily    Yes Historical Provider, MD   Insulin Pen Needle (NOVOFINE) 32G X 6 MM MISC qid 9/22/21   [facility]   ammonium lactate (LAC-HYDRIN) 12 % lotion  6/23/21   Historical Provider, MD   amoxicillin-clavulanate (AUGMENTIN) 875-125 MG per tablet Take 1 tablet by mouth 2 times daily     Historical Provider, MD   ondansetron (ZOFRAN-ODT) 4 MG disintegrating tablet Take 1 tablet by mouth 3 times daily as needed for Nausea or Vomiting 9/8/21   Preeti Abrams,    Continuous Blood Gluc Sensor (FREESTYLE FELY 14 DAY SENSOR) MISC Every 2 weeks 6/23/21   Nakia Foster MD   Insulin Pen Needle ([employer] PEN NEEDLES 31G) 31G X 8 MM MISC 1 each by Does not apply route 4 times daily 6/23/21   Nakia Foster MD   oxybutynin (DITROPAN-XL) 5 MG extended release tablet take 1 tablet by mouth once daily 3/19/21   Steven Marroquin MD   neomycin-polymyxin-dexameth (MAXITROL) 3.5-54372-8.1 ophthalmic suspension instill 1 drop into both eyes four times a day 1/5/21   Historical Provider, MD   OXYGEN 2 lit O2 with sleep , please give O2 concentrator 6/12/20   Mika Rangel MD   FreeStyle Lancets MISC 1 each by Does not apply route daily 1/30/20   Nakia Foster MD   folic acid (FOLVITE) 1 MG tablet Take 1 mg by mouth daily    Historical Provider, MD   Iron Polysacch Hslqf-L68-JD (NIFEREX-150 FORTE PO) Take 1 tablet by mouth daily     Historical Provider, MD   Cyanocobalamin (VITAMIN B-12) 1000 MCG extended release tablet Take 1,000 mcg by mouth daily    Historical Provider, MD       Scheduled Meds:   sodium chloride flush  5-40 mL IntraVENous 2 times per day    enoxaparin  40 mg SubCUTAneous Daily    metoprolol succinate  50 mg Oral Daily    isosorbide dinitrate  20 mg Oral TID    insulin glargine  50 Units SubCUTAneous Nightly    insulin lispro  0-12 Units SubCUTAneous TID     insulin lispro  0-6 Units SubCUTAneous Nightly    aspirin  81 mg Oral Daily    atorvastatin  80 mg Oral Nightly    docusate sodium  100 mg Oral BID    folic acid  1 mg Oral Daily    gabapentin  600 mg Oral BID    haloperidol  5 mg Oral Daily    hydrALAZINE  50 mg Oral 3 times per day    levothyroxine  50 mcg Oral Daily    montelukast  10 mg Oral Nightly    ranolazine  1,000 mg Oral BID    sertraline  200 mg Oral Daily    meclizine  25 mg Oral BID    ticagrelor  90 mg Oral BID    oxybutynin  5 mg Oral Nightly     Continuous Infusions:   sodium chloride      dextrose       PRN Meds:sodium chloride flush, sodium chloride, ondansetron **OR** ondansetron, polyethylene glycol, acetaminophen **OR** acetaminophen, glucose, dextrose, glucagon (rDNA), dextrose, hydrOXYzine    Allergies   Allergen Reactions    Ambien [Zolpidem Tartrate]     Capoten [Captopril]     Clioquinol     Cogentin [Benztropine]     Depakote [Divalproex Sodium]     Effexor Xr [Venlafaxine Hcl Er]     Geodon [Ziprasidone Hcl]     Lisinopril      Hyperkalemia: 4/21/20 potassium was 6.7    Lyrica [Pregabalin]     Navane [Thiothixene]     Pamelor [Nortriptyline Hcl]     Remeron [Mirtazapine]     Risperdal [Risperidone]     Trazodone And Nefazodone     Wellbutrin [Bupropion]        Social History     Socioeconomic History    Marital status:      Spouse name: Not on file    Number of children: Not on file    Years of education: Not on file    Highest education level: Not on file   Occupational History    Not on file   Tobacco Use    Smoking status: Never Smoker    Smokeless tobacco: Never Used   Vaping Use    Vaping Use: Never used   Substance and Sexual Activity    Alcohol use: Never    Drug use: Never    Sexual activity: Not on file   Other Topics Concern    Not on file   Social History Narrative         Lives With: Spouse    Type of Home: [address] [place] [address] in [address]: One level    Home Access: Elevator    Bathroom Shower/Tub: Tub/Shower unit(Simultaneous filing.  User may not have seen previous data.)    Bathroom Equipment: Grab bars in shower, Shower chair    Home Equipment: Rolling walker, Joana 450 bed    ADL Assistance: Needs assistance    Homemaking Assistance: Needs assistance    Homemaking Responsibilities: No    Ambulation Assistance: Independent    Transfer Assistance: Independent    Active : No    Additional Comments: Pt wears orthopedic shoes at baseline    The patient states she is current with [facility](RN per the ). The patient had a walker, but obtained a hospital bed, wheel chair, BSC and O2 last admission (from [address]). pharmacy is [address] [place] [address],[address] [address]., [place]. Transportation is provided by [facility] () per the patient     Social Determinants of Health     Financial Resource Strain:     Difficulty of Paying Living Expenses: Not on file   Food Insecurity:     Worried About [facility] in the Last Year: Not on file    Shayy of Food in the Last Year: Not on file   Transportation Needs:     Lack of Transportation (Medical): Not on file    Lack of Transportation (Non-Medical): Not on file   Physical Activity:     Days of Exercise per Week: Not on file    Minutes of Exercise per Session: Not on file   Stress:     Feeling of Stress : Not on file   Social Connections:     Frequency of Communication with Friends and Family: Not on file    Frequency of Social Gatherings with Friends and Family: Not on file    Attends Druze Services: Not on file    Active Member of [address] [place] [address] or Organizations: Not on file    Attends Club or Organization Meetings: Not on file    Marital Status: Not on file   Intimate Partner Violence:     Fear of Current or Ex-Partner: Not on file    Emotionally Abused: Not on file    Physically Abused: Not on file    Sexually Abused: Not on file   Housing Stability:     Unable to Pay for Housing in the Last Year: Not on file    Number of [place] in the Last Year: Not on file    Unstable Housing in the Last Year: Not on file       No family history on file. Review Of Systems:    14 point ROS negative other than mentioned.      Physical Exam:    CURRENT VITALS: BP (!) 123/55   Pulse 66   Temp 98.4 °F (36.9 °C) (Oral)   Resp 18   Ht 5' 4\" (1.626 m)   Wt 232 lb (105.2 kg)   SpO2 100%   BMI 39.82 kg/m²     CONSTITUTIONAL:  awake, alert, cooperative, no apparent distress,   ENT: Normocephalic, without obvious abnormality, atraumatic, sinuses nontender on palpation, external ears without lesions,  NECK:  Supple, symmetrical, trachea midline, no adenopathy, thyroid symmetric, not enlarged and no tenderness, skin normal, No bruits. LUNGS:  No increased work of breathing, good air exchange, clear to auscultation bilaterally, no crackles, no wheezing. Reproducible Chest Pain with palpation. CARDIOVASCULAR:  Decreeased apical impulse, regular rate and rhythm, normal S1 and S2,  2/6 Systolic murmur noted. ABDOMEN:  Obese, normal bowel sounds, soft, non-distended, non-tender, no masses palpated, no hepatosplenomegally  EXTREMETIES: No edema, Pulses Strong Thruout. No ulcers. NEUROLOGIC:  Awake, alert, oriented to name, place and time. Following all commands and moving all extremties.   SKIN:  no bruising or bleeding, normal skin color, texture, turgor and no rashes    Labs:  Recent Results (from the past 24 hour(s))   POCT Glucose    Collection Time: 11/14/21 11:19 AM   Result Value Ref Range    POC Glucose 228 (H) 60 - 115 mg/dl    Performed on ACCU-CHEK    POCT Glucose    Collection Time: 11/14/21  4:17 PM   Result Value Ref Range    POC Glucose 183 (H) 60 - 115 mg/dl    Performed on ACCU-CHEK    POCT Glucose    Collection Time: 11/14/21  7:56 PM   Result Value Ref Range    POC Glucose 169 (H) 60 - 115 mg/dl    Performed on ACCU-CHEK    POCT Glucose    Collection Time: 11/15/21  5:38 AM   Result Value Ref Range    POC Glucose 145 (H) 60 - 115 mg/dl    Performed on ACCU-CHEK    Lipid panel - fasting    Collection Time: 11/15/21  6:56 AM   Result Value Ref Range    Cholesterol, Total 114 0 - 199 mg/dL    Triglycerides 80 0 - 150 mg/dL    HDL 33 (L) 40 - 59 mg/dL    LDL Calculated 65 0 - 129 mg/dL   CBC auto differential    Collection Time: 11/15/21  6:56 AM   Result Value Ref Range    WBC 9.1 4.8 - 10.8 K/uL    RBC 3.79 (L) 4.20 - 5.40 M/uL    Hemoglobin 9.7 (L) 12.0 - 16.0 g/dL    Hematocrit 29.3 (L) 37.0 - 47.0 %    MCV 77.4 (L) 82.0 - 100.0 fL    MCH 25.7 (L) 27.0 - 31.3 pg    MCHC 33.2 33.0 - 37.0 %    RDW 17.4 (H) 11.5 - 14.5 %    Platelets 633 503 - 486 K/uL    Neutrophils % 66.1 %    Lymphocytes % 19.2 %    Monocytes % 11.5 %    Eosinophils % 2.7 %    Basophils % 0.5 %    Neutrophils Absolute 6.0 1.4 - 6.5 K/uL    Lymphocytes Absolute 1.8 1.0 - 4.8 K/uL    Monocytes Absolute 1.0 (H) 0.2 - 0.8 K/uL    Eosinophils Absolute 0.3 0.0 - 0.7 K/uL    Basophils Absolute 0.0 0.0 - 0.2 K/uL       ECG:     Normal sinus rhythm  Low voltage QRS  Incomplete right bundle branch block  Septal infarct , age undetermined  ST & T wave abnormality, consider inferolateral ischemia  Abnormal ECG         Shelbie Mitchell MD  1451  Induction Manager Cardiologist      Electronically signed on 11/14/21 at 12:46 PM EST      -----  Last 1451 [employer] OFFICE Note:      Subjective  PCP: [facility]   Subjective:   07/14/2021 - Rubén Ordonez was seen in cardiac evaluation at the [facility] office 07/14/2021. The patients problems are listed in the impression below. Electronic medical records reviewed. Patient returns. She feels well currently. She no longer has any chest pain. She is asymptomatic. Patient denies Chest Pain, SOB, Lightheadedness, Dizziness, TIA or CVA symptoms. No CHF or Edema. No Palpitations. No GI,  or Bleeding Issues. No Recent Fever or Chills. Cardiovascular and general review of systems is otherwise negative. A 14-system review is otherwise negative, other than noted. ELECTROCARDIOGRAM: Sinus rhythm, poor wave enter progression, first-degree AV block. Rate 66. CARDIAC TESTING: None    LABORATORY DATA: Cholesterol 122, triglyceride 118, HDL 47, LDL 51. Chem-7, CBC, liver function is normal except for hemoglobin 7.5, creatinine 1.2, GFR 44, magnesium 1.9. Otherwise as noted below. All above testing was personally reviewed.     PHYSICAL EXAMINATION: General: No acute distress. Vital signs as noted. Alert and oriented. Head And Neck Examination: No jugular venous distention, no carotid bruits, no mass. Carotid upstrokes preserved. Oral mucosa moist. No xanthelasma. Head and neck examination otherwise unremarkable. Lungs: Clear to auscultation and percussion. No wheezes, no rales, and no rhonchi. Chest: Excursion appeared to be normal. No chest wall tenderness on palpation. Lateral incisional scar. Heart: Normal S1 and S2. No S3. No S4. No rub. Grade 2/6 systolic murmur, best heard at the left sternal border. Point of maximal impulse was within normal limits. Abdomen: Soft. Nontender. No organomegaly. No bruits. No masses. Obese. Extremities: No bipedal edema. No clubbing. No cyanosis. Pulses are diminished throughout. No bruits. Right lower extremity foot/toe infection. Musculoskeletal Exam: No ulcers, otherwise unremarkable. Neuro: Neurologically appeared grossly intact. IMPRESSION:     Cardiovascular status stable  Chest pain, reproducible, musculoskeletal, noncardiac, resolved. CAD post CABG June 2019 [facility], [place] to LAD, PCI to left circumflex and right coronary artery, last April 2020. Post catheterization April 2021, medical treatment advised. Ischemic cardiomyopathy with apical hypokinesia LVEF 50%. PVOD post right lower extremity revascularization,  and [facility] Dr Rosalind Goldsmith, (details not available), Right third toe amputation site infection. Carotid artery disease with ultrasound noted 50-69% bilateral stenoses May 2019. Syncope, presyncope  History of heart failure  Hypothyroidism  Hypertension  Hyperlipidemia  Diabetes  Depression  Schizophrenia  Family history of coronary artery disease  Multiple allergies as noted  Otherwise as per assessment below. RECOMMENDATIONS:     Patient continues to do well overall. Would suggest that she continue her current medications. Refills were provided.     She did discussed possible bariatric surgery and from a cardiovascular standpoint she be acceptable candidate with low cardiovascular risk. Exercise dietary weight reduction program was encouraged. Hydration. Follow my health portal was encouraged. We will plan to see back in 6 months with Laboratory Studies and ECG as noted below. Patient will follow up with their primary physician for general care. The patient knows to contact medical care earlier if need be. Martha Pritchett MD, [address] / [address] [facility]      Of Note:  [employer] voice recognition dictation software was utilized partially in the preparation of this note, therefore, inaccuracies in spelling, word choice and punctuation may have occurred which were not recognized the time of signing. Chief Complaint  Cardiology Reason for Visit_NOH: 3M EKG        Active Problems   1. Abnormal EKG (794.31) (R94.31)   2. CAD in native artery (414.01) (I25.10)   · Post CABG LIMA LAD 6/19 [facility] DTW,      post LCX PCI 4/20.   3. Carotid atherosclerosis (433.10) (I65.29)   · US Biolateral 50-69% Dz 6/19   4. CKD (chronic kidney disease), stage III (585.3) (N18.30)   5. Depression (311) (F32.9)   6. Diabetes (250.00) (E11.9)   7. HTN (hypertension) (401.9) (I10)   8. Hyperlipidemia (272.4) (E78.5)   9. Hypothyroidism (244.9) (E03.9)   10. Ischemic cardiomyopathy (414.8) (I25.5)   · 50% with ANT Moderate HK. 11. Mitral regurgitation (424.0) (I34.0)   · 3+   12. Morbid obesity (278.01) (E66.01)   13. Never a smoker   14. PVD (peripheral vascular disease) (443.9) (I73.9)   · Post RIght Bypass 6/19  DTW   15. Schizo-affective psychosis (295.70) (F25.9)    Family History   1. Family history of diabetes mellitus (V18.0) (Z83.3) : Mother, Sister   2. Family history of Hodgkin's lymphoma (V16.7) (Z80.7) : Father   3. Family history of hypertension (V17.49) (Z82.49) : Mother, Father, Sister, Brother   4. Family history of myocardial infarction (V17.3) (Z82.49) : Father   5. Family history of Lupus : Sister    Social History   · Daily caffeine consumption, 1 serving a day   · Never a smoker   · No alcohol use   · No illicit drug use    Surgical History   1. History of Toe amputation   2. History of Tooth extraction    Current Meds   1. Albuterol Sulfate  MCG/ACT AERS; INHALE 1 PUFF EVERY 4 HOURS AS   NEEDED; Therapy: ([facility] Blunt) to Recorded   2. Ammonium Lactate 12 % External Lotion; APPLY  AND RUB  IN A THIN FILM TO   AFFECTED AREAS TWICE DAILY. (AM AND PM); Therapy: [id number] to Recorded   3. Artificial Tears 1.4 % Ophthalmic Solution; INSTILL 1-2 DROPS INTO AFFECTED EYE(S)    4 TIMES DAILY AS DIRECTED; Therapy: [id number] to Recorded   4. Aspirin 81 MG Oral Tablet Delayed Release; TAKE 1 TABLET DAILY  Requested for:   [id number]; Last Rx:[id number] Ordered   5. Atorvastatin Calcium 80 MG Oral Tablet; TAKE 1 TABLET AT BEDTIME; Therapy: 52Loi5179 to (Evaluate:21Szr5827)  Requested for: 35Ipo9808; Last   Rx:[id number] Ordered   6. Brilinta 90 MG Oral Tablet; TAKE 1 TABLET TWICE DAILY; Therapy: 13Htv8243 to (Evaluate:93Ktd8583)  Requested for: 58Mgv1975; Last   Rx:[id number] Ordered   7. Buprenorphine 7.5 MCG/HR Transdermal Patch Weekly; Therapy: 51AVY4952 to Recorded   8. Diclofenac Sodium 1 % External Gel; APPLY SPARINGLY TO AFFECTED AREA(S) ONCE   DAILY; Therapy: [id number] to Recorded   9.  MG Oral Capsule; take 1 cap daily; Therapy: 49USV8956 to Recorded   10. Doxepin HCl - 10 MG Oral Capsule; TAKE 1 TO 2 CAPSULES AT BEDTIME AS NEEDED    FOR ITCHING; Therapy: 35TLK0305 to Recorded   11. Furosemide 40 MG Oral Tablet; Take one tab daily; Therapy: 95Fsv6199 to (Evaluate:28Dgv8756)  Requested for: 27Vec7080; Last    Rx:[id number] Ordered   12. Gabapentin 400 MG Oral Capsule; 1 Cap in the morning 2 at Night; Therapy: (Recorded:86Tbg1298) to Recorded   13. Haloperidol 5 MG Oral Tablet; TAKE 1 TABLET TWICE DAILY;     Therapy: (Evangelist Blunt) to 35EFZ3545 to Recorded    Reviewed meds with PT list.DS MA     Allergies   1. Ambien TABS   nervousness; Insomnia; Recorded By: Mirlande Perry; 04/17/2019 3:02:20 PM   2. Capoten TABS   Cough; Palpitations; Recorded By: Mirlande Perry; 04/17/2019 3:02:20 PM   3. Cogentin   Palpitations; Recorded By: Mirlande Perry; 04/17/2019 3:02:20 PM   4. Geodon CAPS   vision problems; Sleeplessness; Recorded By: Mirlande Perry; 04/17/2019 3:02:20 PM   5. KlonoPIN TABS   Palpitations; Recorded By: Mirlande Perry; 04/17/2019 3:02:20 PM   6. Navane CAPS   Headache; Recorded By: Mirlande Perry; 04/17/2019 3:02:20 PM   7. Remeron   Nausea; Vomiting; Recorded By: Mirlande Perry; 04/17/2019 3:02:20 PM   8. RisperDAL TABS   Nausea; Recorded By: Mirlande Perry; 04/17/2019 3:02:20 PM   9. SEROquel TABS   Palpitations; Recorded By: Mirlande Perry; 04/17/2019 3:02:20 PM   10. Abilify   Recorded By: Silvestre Gonzalez; 11/08/2019 11:02:43 AM   11. Depakote ER TB24   unsteady, falls; Recorded By: Mirlande Perry; 04/17/2019 3:02:20 PM   12. Effexor TABS   patient states she felt high on medication;  Recorded By: Mirlande Perry; 04/17/2019 3:02:20 PM    Immunizations  FLU --- Valeria Diya: [id number] Rede: 01-Oct-2020   PCV --- Series1: 01-Oct-2019     Vitals  Vital Signs   Recorded: [id number] 01:59PM   Heart Rate: 66  Recorded: 09TGX7482 01:46PM   Height: 5 ft 1 in  Weight: 240 lb   BMI Calculated: 45.35 kg/m2  BSA Calculated: 2.04  Blood Pressure: 122 / 78, RUE, Sitting  Falls Screening: No falls within the past year    Results/Data  Complete Blood Count [id number] 10:46AM [facility] GALLARDO     Test Name Result Flag Reference   [facility] Blood Cells 7.5 K/uL  4.8-10.8   RBC 4.77 M/uL  4.20-5.40   HGB 12.3 g/dL  12.0-16.0   HCT 36.0 % L 37.0-47.0   MCV 75.5 fL L 82.0-100.0   MCH 25.8 pg L 27.0-31.3   MCHC 34.2 %  33.0-37.0   Platelet Count 086 K/uL  130-400   RDW 16.8 % H 11.5-14.5     Hemoglobin A1C 72Emi0375 10:46AM [facility]     Test Name Result Flag Ischemic cardiomyopathy (414.8) (I25.5)   4. Mitral regurgitation (424.0) (I34.0)   5. PVD (peripheral vascular disease) (443.9) (I73.9)   6. HTN (hypertension) (401.9) (I10)   7. Hyperlipidemia (272.4) (E78.5)   8. Hypothyroidism (244.9) (E03.9)   9. Diabetes (250.00) (E11.9)   10. CKD (chronic kidney disease), stage III (585.3) (N18.30)   11. Abnormal EKG (794.31) (R94.31)   12. Depression (311) (F32.9)   13. Morbid obesity (278.01) (E66.01)    Plan   1. Renew: Ranolazine  MG Oral Tablet Extended Release 12 Hour; TAKE 1 TABLET   EVERY 12 HOURS   2. Basic Metabolic Panel; Status:Active; Requested for:88Rkw1333;   Call if Potassium result is <3.5 or >5.0 : YES   3. CBC; Status:Active; Requested for:48Wbg1264;    4. EKG at next office visit.; Status:Active; Requested for:72Sbx3299;    5. Encouraged regular exercise,improved dietary habits.; Status:Complete;   Done:   44SKM5773   6. Hemoglobin A1C; Status:Active; Requested for:92Cni6930;    7. Lifestyle education regarding diet; Status:Complete;   Done: 01CGW3789   8. Lipid Panel w/Reflex LDL; Status:Active; Requested for:57Nvq1248;    9. LIVER PANEL; Status:Active; Requested for:31Acv4504;    10. Magnesium; Status:Active; Requested for:33Edl5458;    11. TSH; Status:Active; Requested for:24Hrr3654;    12. 6 month follow-up/call if interval problems. Evaluation and Treatment  Follow-up  Status:    Active  Requested for: 93JRR9207   37. Follow up per family physician. Evaluation and Treatment  Follow-up  Status: Active     Requested for: 92JEU5072   34. Access your medical record, request prescriptions, view laboratory and testing done in    our office, request appointments, view immunization records and message your provider    through our safe and secure patient portal. Ask a staff member how    to register or visit us at [email or web address] to get started today.;    Status:Complete;   Done: 64YCE0077   15. Begin or continue regular aerobic exercise. Gradually work up to at least 3 sessions of    30 minutes of exercise a week.; Status:Active; Requested for:10Nyb5809;    16. Continue same medications/treatment.; Status:Active; Requested for:24Qzz1619;    17. Diets that are low in carbohydrates and high in protein are very popular for weight loss.;    Status:Active; Requested for:84Gni3485;    18. Drink plenty of fluids.; Status:Active; Requested for:38Iri4898;    19. Please bring all medicines, vitamins, and herbal supplements with you when you come    to the office.; Status:Active; Requested for:70Cbs1572;    20. Please bring any lab results from other providers/physicians to your next appointment.;    Status:Active; Requested for:35Yaj3369;    21. Prescriptions will not be filled unless you are compliant with your follow up appointments    or have a follow up appointments scheduled as per the instruction of your physician. Refills for medications should be requested at the time of your office visit.; Status:Active; Requested for:04Ffi4274;    22. Thank you for being a patient at [facility]. Our goal is to    provide high quality medical care. Following today's visit, please check your email for an    invitation to complete our patient satisfaction survey. By sharing your feedback, you will    help us continue our mission to provide excellent medical care. Your participation in the    survey is voluntary and completely confidential. We appreciate your thoughts regarding    today's visit.; Status:Active; Requested for:29Omf2633;    23.  Tobacco use Hx Reported; Status:Complete;   Done: 11EGA8417    Signatures  Electronically signed by : Osorio Chawla MA; Jul 14 2021  1:48PM EST                        Electronically signed by : Dorothy Obregon, ; Jul 14 2021  2:01PM EST                        Electronically signed by : Yao Narayanan LPN; Jul 14 4875  5:14QG EST                        Electronically signed by : Aleyda Burden M.D.; Jul 22 2021 11:38PM EST

## 2021-11-15 NOTE — DISCHARGE SUMMARY
Physician Discharge Summary     Patient ID:  Isabela Linda  [id number]  42 y.o.  1955    Admit date: 11/13/2021    Discharge date : 11/17/2021    Admitting Physician: No admitting provider for patient encounter. Discharge Physician: Jorge Parrish DO     Admission Diagnoses: Acute decompensated heart failure ([address] [I50.9]  Acute on chronic congestive heart failure, unspecified heart failure type ([address] [I50.9]    Discharge Diagnoses: Acute decompensated heart failure ([address] [I50.9]  Acute on chronic congestive heart failure, unspecified heart failure type ([address] [I50.9]    Admission Condition: fair    Discharged Condition: good    Hospital Course: 61 y.o. female with a history of CAD, CKD, diabetes, hyperlipidemia, hypertension, PVD, presents with shortness of breath and chest pain. Symptoms started earlier in the day around lunchtime. She described as tight feeling on the left with shortness of breath. No other associated symptoms. The chest pain has resolved and patient is continuing to complain of shortness of breath. She said that since she came to the ER she feels better. Pt was admitted and cardiology consulted. Started on IV Lasix for acute on chronic diastolic CHF on O2. Pt on 3L O2 at home at baseline. Pt diuresed well and was weaned to baseline O2. Cardiology recommended outpatient follow up for stress test and was ok for discharge on 11/15. Pt was apprehensive to discharge so pt was kept one more day and developed an RADHA. Diuretics held. Pt follows up with nephrology for CKD and has an appt next week. Renal function improved on 11/17 and pt discharged home in stable and improved condition to follow up with cardiology, nephrology and PCP. Consults: cardiology      Discharge Exam:  General appearance: No apparent distress, appears stated age and cooperative. HEENT:  Conjunctivae/corneas clear. Neck:  Trachea midline. Respiratory:  Normal respiratory effort.  Clear to auscultation  Cardiovascular: Regular rate and rhythm   Abdomen: Soft, non-tender, non-distended with normal bowel sounds. Musculoskeletal: trace edema   Neuro: Non Focal.   Capillary Refill: Brisk,< 3 seconds   Peripheral Pulses: +2 palpable, equal bilaterally     Labs:   Recent Labs     11/13/21  1945 11/13/21  2154 11/15/21  0656   WBC 10.2  --  9.1   HGB 10.6* 10.2* 9.7*   HCT 31.6*  --  29.3*     --  241     Recent Labs     11/13/21 1945 11/13/21 2012 11/13/21 2154 11/14/21 0229   *  --   --  134*   K 4.4  --   --  3.8   CL 99  --   --  98   CO2 25  --   --  24   BUN 32*  --   --  33*   CREATININE 1.34* 1.5* 1.5* 1.30*   CALCIUM 9.0  --   --  9.0     Recent Labs     11/13/21 1945 11/14/21 0229   AST 41* 28   ALT 27 25   BILITOT 0.4 0.5   ALKPHOS 104 96     Recent Labs     11/13/21 2000   INR 1.2     Recent Labs     11/13/21 1945 11/14/21 0229 11/14/21  0804   CKTOTAL 78  --   --    TROPONINI 0.038* 0.035* 0.024*       Urinalysis:   Lab Results   Component Value Date    NITRU Negative 10/20/2021    BLOODU Negative 10/20/2021    SPECGRAV 1.016 10/20/2021    GLUCOSEU Negative 10/20/2021       Radiology:   Most recent    Chest CT      WITH CONTRAST:No results found for this or any previous visit. WITHOUT CONTRAST: No results found for this or any previous visit. CXR      2-view: Results for orders placed during the hospital encounter of 10/28/21    XR CHEST (2 VW)    Narrative  EXAMINATION: XR CHEST (2 VW)    CLINICAL HISTORY: RIGHT LOWER LOBE PNEUMONIA    COMPARISONS: AUGUST 21, 2021    FINDINGS: Median sternotomy. Cardiopericardial silhouette normal. Aorta calcified. Pulmonary vasculature normal. Ill-defined areas increase opacity posterior right lung base. Left lung clear. Impression  RIGHT LOWER LUNG ATELECTASIS/PNEUMONIA.       Results for orders placed during the hospital encounter of 06/28/20    XR CHEST STANDARD (2 VW)    Narrative  XR CHEST (2 VW) : 6/29/2020    CLINICAL HISTORY:  SOB .    COMPARISON: 6/28/2020. TECHNIQUE: Upright AP and lateral radiographs of the chest were obtained. FINDINGS:    A shallow inspiration is present without significant infiltrate identified. The heart remains mildly enlarged with postoperative changes from previous CABG. There is no significant pleural effusion, vascular congestion, pneumothorax, or displaced fractures identified. Impression  NO EVIDENCE OF ACTIVE CARDIOPULMONARY DISEASE. Portable: Results for orders placed during the hospital encounter of 11/13/21    XR CHEST PORTABLE    Narrative  Exam: XR CHEST PORTABLE    History: Shortness of breath    Technique: AP portable view of the chest obtained. Comparison: Chest x-rays October 28, 2021    Findings:    Suboptimal inspiration. Evidence of prior thoracic surgery. Atherosclerotic calcification of the thoracic aorta. The cardiomediastinal silhouette is within normal limits. No pneumothorax, pleural effusion, or consolidation. Bibasilar atelectasis and/or  scarring. No acute osseous abnormality. Impression  No radiographic evidence of acute intrathoracic process. Echo No results found for this or any previous visit. Disposition: home    In process/preliminary results:  Outstanding Order Results     Date and Time Order Name Status Description    11/13/2021  8:33 PM Culture, Blood 2 Preliminary     11/13/2021  8:07 PM Culture, Blood 1 Preliminary           Patient Instructions:   Current Discharge Medication List      CONTINUE these medications which have NOT CHANGED    Details   oxyCODONE-acetaminophen (PERCOCET) 5-325 MG per tablet Take 1 tablet by mouth every 4 hours as needed for Pain.       Dulaglutide (TRULICITY) 3.61 QU/8.6GZ SOPN Inject 0.75 mg into the skin once a week  Qty: 4 pen, Refills: 3      levothyroxine (SYNTHROID) 50 MCG tablet take 1 tablet by mouth once daily  Qty: 30 tablet, Refills: 5      insulin lispro, 1 Unit Dial, (HUMALOG KWIKPEN) 100 UNIT/ML SOPN 15  units at each meals  Qty: 10 pen, Refills: 03      atorvastatin (LIPITOR) 80 MG tablet take 1 tablet by mouth at bedtime      diclofenac sodium (VOLTAREN) 1 % GEL apply 4 grams to affected area three times a day AS NEEDED FOR PAIN      doxepin (SINEQUAN) 25 MG capsule 75 mg nightly       insulin glargine (LANTUS SOLOSTAR) 100 UNIT/ML injection pen 50 units at bedtime  Qty: 15 pen, Refills: 3      ranolazine (RANEXA) 1000 MG extended release tablet Take 1 tablet by mouth 2 times daily  Qty: 60 tablet, Refills: 3      gabapentin (NEURONTIN) 600 MG tablet Take 600 mg by mouth 2 times daily. albuterol sulfate HFA (VENTOLIN HFA) 108 (90 Base) MCG/ACT inhaler Inhale 2 puffs into the lungs as needed for Wheezing Every 4-6 hours PRN  Qty: 1 Inhaler, Refills: 5    Associated Diagnoses: Mild intermittent asthma without complication      albuterol (PROVENTIL) (2.5 MG/3ML) 0.083% nebulizer solution Take 3 mLs by nebulization every 6 hours as needed for Wheezing  Qty: 120 each, Refills: 5    Associated Diagnoses: Mild intermittent asthma without complication      montelukast (SINGULAIR) 10 MG tablet Take 1 tablet by mouth nightly  Qty: 30 tablet, Refills: 5    Associated Diagnoses: Mild intermittent asthma without complication      Alcohol Swabs (ALCOHOL PREP) 70 % PADS qid  Qty: 300 each, Refills: 06      RESTASIS 0.05 % ophthalmic emulsion       ARTIFICIAL TEARS 1.4 % ophthalmic solution       docusate sodium (COLACE, DULCOLAX) 100 MG CAPS Take 100 mg by mouth 2 times daily  Qty: 60 capsule, Refills: 1      sertraline (ZOLOFT) 100 MG tablet Take 200 mg by mouth daily       !!  Continuous Blood Gluc Sensor (FREESTYLE FELY 14 DAY SENSOR) MISC Every 2 weeks  Qty: 2 each, Refills: 06      Continuous Blood Gluc  (FREESTYLE FELY 14 DAY READER) CATHLEEN As directed  Qty: 1 Device, Refills: 00      furosemide (LASIX) 40 MG tablet Take 1 tablet by mouth daily  Qty: 60 tablet, Refills: 3      ticagrelor (BRILINTA) 90 MG TABS tablet Take 90 mg by mouth 2 times daily      CPAP Machine MISC by Does not apply route New CPAP with 10 cm  Qty: 1 each, Refills: 0    Associated Diagnoses: JESICA (obstructive sleep apnea)      aspirin 81 MG EC tablet Take 1 tablet by mouth daily  Qty: 30 tablet, Refills: 3      Emollient (EUCERIN INTENSIVE REPAIR HAND) 2.5-10 % CREA APPLY TO FEET AT BEDTIME; PLACE SOCKS OVER FEET AFTER APPLICATION IF NEEDED FOR DRY CRACKING FEET      hydrOXYzine (VISTARIL) 25 MG capsule Take 50 mg by mouth 3 times daily as needed       Nutritional Supplements (GLUCERNA SHAKE) LIQD take as directed three times a day      isosorbide dinitrate (ISORDIL) 20 MG tablet Take 1 tablet by mouth 3 times daily  Qty: 90 tablet, Refills: 3      nitroGLYCERIN (NITROSTAT) 0.4 MG SL tablet up to max of 3 total doses. If no relief after 1 dose, call 911. Qty: 25 tablet, Refills: 3      hydrALAZINE (APRESOLINE) 50 MG tablet Take 1 tablet by mouth every 8 hours  Qty: 90 tablet, Refills: 3      metoprolol succinate (TOPROL XL) 50 MG extended release tablet Take 1 tablet by mouth 2 times daily  Qty: 30 tablet, Refills: 3      haloperidol (HALDOL) 5 MG tablet Take 5 mg by mouth daily      blood glucose test strips (FREESTYLE LITE) strip 1 each by In Vitro route daily As needed. Qty: 100 each, Refills: 3      meclizine (ANTIVERT) 25 MG tablet Take 25 mg by mouth 2 times daily       !! Insulin Pen Needle (NOVOFINE) 32G X 6 MM MISC qid  Qty: 300 each, Refills: 3      ammonium lactate (LAC-HYDRIN) 12 % lotion       ondansetron (ZOFRAN-ODT) 4 MG disintegrating tablet Take 1 tablet by mouth 3 times daily as needed for Nausea or Vomiting  Qty: 21 tablet, Refills: 0      !! Continuous Blood Gluc Sensor (FREESTYLE FELY 14 DAY SENSOR) MISC Every 2 weeks  Qty: 2 each, Refills: 06      !!  Insulin Pen Needle ([employer] PEN NEEDLES 31G) 31G X 8 MM MISC 1 each by Does not apply route 4 times daily  Qty: 300 each, Refills: 3      oxybutynin (DITROPAN-XL) 5 MG extended release tablet take 1 tablet by mouth once daily  Qty: 90 tablet, Refills: 3      neomycin-polymyxin-dexameth (MAXITROL) 3.5-79826-8.1 ophthalmic suspension instill 1 drop into both eyes four times a day      OXYGEN 2 lit O2 with sleep , please give O2 concentrator  Qty: 1 Units, Refills: 0      FreeStyle Lancets MISC 1 each by Does not apply route daily  Qty: 100 each, Refills: 3      folic acid (FOLVITE) 1 MG tablet Take 1 mg by mouth daily      Iron Polysacch Cozgp-H20-BD (NIFEREX-150 FORTE PO) Take 1 tablet by mouth daily       Cyanocobalamin (VITAMIN B-12) 1000 MCG extended release tablet Take 1,000 mcg by mouth daily       ! ! - Potential duplicate medications found. Please discuss with provider. STOP taking these medications       amoxicillin-clavulanate (AUGMENTIN) 875-125 MG per tablet Comments:   Reason for Stopping:         prazosin (MINIPRESS) 2 MG capsule Comments:   Reason for Stopping:         amitriptyline (ELAVIL) 25 MG tablet Comments:   Reason for Stopping:         gabapentin (NEURONTIN) 400 MG capsule Comments:   Reason for Stopping:             Activity: activity as tolerated  Diet: diabetic diet  Wound Care: none needed    Follow-up with Samantha Villegas  in 1 week.     DC time 35 minutes    Signed:  Electronically signed by Airan Love DO on 11/15/2021 at 3:57 PM

## 2021-11-16 NOTE — PROGRESS NOTES
[facility] Cardiology Progress Note        Date:   2021    Patient:    Jade Gannon    :    1955  CSN:    [id number]    Rounding Cardiologist: Claire Mata MD     Primary Cardiologist: Kaleb Juarez MD [facility]    Requesting Physician:  Guzman Bush DO      Reason for Initial Consult:  SOB, Chest pain, possible CHF    Subjective:    No Changes over nite. Feels well. Anticipated DC in AM.    Lying flat with Orthopnea or distress. No Edema. 14 system General and Cardiac ROS otherwise negative or unchanged. Assessment:    1. SOB  2. Chest discomfort, chronic, reproducible, musculoskeletal  3. Elevated troponins, low flat pattern, not acute true myocardial infarction, due to demand type 2 MI.  4. CHF Systollic, Acute on Chronic. 5. Coronary artery disease, post prior CABG, prior left main left circumflex stent, known 100% right coronary occlusion chronic, last cath  with patent stent and bypass, medical treatment. 6. Ischemic Cardiomyopathy, with anterior hypokinesis, LVEF 35-40% by echo, 56% by Myoview .  7. Negative Lexiscan Myoview stress test for significant MI or Ischemia, Normal LV function, LVEF 56%, 21. 8. Abnormal electrocardiogram with first-degree AV block and anterior MI.  9. Right bundle branch block, new, alternating, now resolved. 10. Hypertension  11. Hyperlipidemia  12. Renal insufficiency, chronic  13. Anemia  14. Pulmonary hypertension  15. Peripheral vascular disease, history of right toe amputation. 16. Schizoaffective disorder  17. Family history of CAD    Plan:    1. Cardiac Supportive Care  2. Continue prior Medications. 3. OK to DC home form CV point.   4. Follow up with [address] with eventual outpatient stress test if symptoms persist.  5. See Orders  6.    ======================    HISTORY OF PRESENT ILLNESS:      Jade Gannon is a pleasant 61 y.o. female who presented with the above past cardiac history now with worsening SOB and Chest Pain. Has increased troponins and cardiac markers. Cardiology asked to see in Consultation. Patient History and Records, EMR reviewed. Patient Interviewed and examined. Turkish speaking,  present. Denies LH, Dizziness, TIA or CVA Symptoms. No Palpitations. No Syncope. No Fever, Chills or Cold symptoms. No GI,  or Bleeding complaints. Cardiac and general ROS otherwise negative. [address] otherwise negative other than noted. Past Medical History:   Diagnosis Date    Asthma     CAD (coronary artery disease)     CKD (chronic kidney disease) stage 3, GFR 30-59 ml/min ([facility])     Colitis     Diabetes mellitus [facility]     Hyperlipidemia     Hypertension     PAD (peripheral artery disease) ([facility])     Prolonged emergence from general anesthesia     PVD (peripheral vascular disease) ([facility]     Thyroid disease        Past Surgical History:   Procedure Laterality Date    CARDIAC SURGERY      CATARACT REMOVAL WITH IMPLANT Bilateral 11- 11-     SECTION      COLONOSCOPY N/A 2019    COLONOSCOPY DIAGNOSTIC performed by Nir Jacome MD at [address] [place] [address] GRAFT  2019    unknown vessels    HYSTERECTOMY      TOE AMPUTATION Right     3rd toe       Prior to Admission medications    Medication Sig Start Date End Date Taking? Authorizing Provider   oxyCODONE-acetaminophen (PERCOCET) 5-325 MG per tablet Take 1 tablet by mouth every 4 hours as needed for Pain. Yes Historical Provider, MD   Dulaglutide (TRULICITY) 0.65 CN/4.8EL SOPN Inject 0.75 mg into the skin once a week  Patient taking differently: Inject 0.75 mg into the skin once a week On .  21  Yes Lani Mccann MD   levothyroxine (SYNTHROID) 50 MCG tablet take 1 tablet by mouth once daily 21  Yes Lani Mccann MD   insulin lispro, 1 Unit Dial, (HUMALOG KWIKPEN) 100 UNIT/ML SOPN 15  units at each meals 21  Yes Lani Mccann MD   atorvastatin (LIPITOR) 80 MG tablet take 1 tablet by mouth at bedtime 8/6/21  Yes Historical Provider, MD   diclofenac sodium (VOLTAREN) 1 % GEL apply 4 grams to affected area three times a day AS NEEDED FOR PAIN 8/22/21  Yes Historical Provider, MD   doxepin (SINEQUAN) 25 MG capsule 75 mg nightly  8/6/21  Yes Historical Provider, MD   insulin glargine (LANTUS SOLOSTAR) 100 UNIT/ML injection pen 50 units at bedtime 9/9/21  Yes Juana Chang MD   ranolazine (RANEXA) 1000 MG extended release tablet Take 1 tablet by mouth 2 times daily 8/25/21  Yes Edwina Bell MD   gabapentin (NEURONTIN) 600 MG tablet Take 600 mg by mouth 2 times daily. Yes Historical Provider, MD   albuterol sulfate HFA (VENTOLIN HFA) 108 (90 Base) MCG/ACT inhaler Inhale 2 puffs into the lungs as needed for Wheezing Every 4-6 hours PRN 6/29/21  Yes Chaka Kern MD   albuterol (PROVENTIL) (2.5 MG/3ML) 0.083% nebulizer solution Take 3 mLs by nebulization every 6 hours as needed for Wheezing 6/29/21 11/14/21 Yes Chaka Kern MD   montelukast (SINGULAIR) 10 MG tablet Take 1 tablet by mouth nightly 6/29/21  Yes Chaka Kern MD   Alcohol Swabs (ALCOHOL PREP) 70 % PADS qid 3/24/21  Yes Juana Chang MD   RESTASIS 0.05 % ophthalmic emulsion  3/8/21  Yes Historical Provider, MD   ARTIFICIAL TEARS 1.4 % ophthalmic solution  3/8/21  Yes Historical Provider, MD   docusate sodium (COLACE, DULCOLAX) 100 MG CAPS Take 100 mg by mouth 2 times daily  Patient taking differently: Take 200 mg by mouth daily At bedtime.  10/3/20  Yes Maria G Pro MD   sertraline (ZOLOFT) 100 MG tablet Take 200 mg by mouth daily  9/14/20  Yes Historical Provider, MD   Continuous Blood Gluc Sensor (FREESTYLE FELY 14 DAY SENSOR) MISC Every 2 weeks 9/21/20  Yes Juana Chang MD   Continuous Blood Gluc  (FREESTYLE FELY 14 DAY READER) CATHLEEN As directed 9/21/20  Yes Juana Chang MD   furosemide (LASIX) 40 MG tablet Take 1 tablet by mouth daily 9/4/20  Yes [address] Barthel, MD   ticagrelor (BRILINTA) 90 MG TABS tablet Take 90 mg by mouth 2 times daily   Yes Historical Provider, MD   CPAP Machine MISC by Does not apply route New CPAP with 10 cm 8/20/20  Yes Rafia Salter MD   aspirin 81 MG EC tablet Take 1 tablet by mouth daily 6/30/20  Yes Guhlam Valdivia MD   Emollient (EUCERIN INTENSIVE REPAIR HAND) 2.5-10 % CREA APPLY TO FEET AT BEDTIME; PLACE SOCKS OVER FEET AFTER APPLICATION IF NEEDED FOR DRY CRACKING FEET 3/15/20  Yes Historical Provider, MD   hydrOXYzine (VISTARIL) 25 MG capsule Take 50 mg by mouth 3 times daily as needed  3/4/20  Yes Historical Provider, MD   Nutritional Supplements ([address]) LIQD take as directed three times a day 6/1/20  Yes Historical Provider, MD   isosorbide dinitrate (ISORDIL) 20 MG tablet Take 1 tablet by mouth 3 times daily 4/28/20  Yes Ghulam Valdivia MD   nitroGLYCERIN (NITROSTAT) 0.4 MG SL tablet up to max of 3 total doses. If no relief after 1 dose, call 911. 4/28/20  Yes Ghulam Valdivia MD   hydrALAZINE (APRESOLINE) 50 MG tablet Take 1 tablet by mouth every 8 hours 4/28/20  Yes Ghulam Valdivia MD   metoprolol succinate (TOPROL XL) 50 MG extended release tablet Take 1 tablet by mouth 2 times daily 4/28/20  Yes Ghulam Valdivia MD   haloperidol (HALDOL) 5 MG tablet Take 5 mg by mouth daily   Yes Historical Provider, MD   blood glucose test strips (FREESTYLE LITE) strip 1 each by In Vitro route daily As needed.  1/30/20  Yes Jon Browning MD   meclizine (ANTIVERT) 25 MG tablet Take 25 mg by mouth 2 times daily    Yes Historical Provider, MD   Insulin Pen Needle (NOVOFINE) 32G X 6 MM MISC qid 9/22/21   Jon Browning MD   ammonium lactate (LAC-HYDRIN) 12 % lotion  6/23/21   Historical Provider, MD   ondansetron (ZOFRAN-ODT) 4 MG disintegrating tablet Take 1 tablet by mouth 3 times daily as needed for Nausea or Vomiting 9/8/21   Preeti Abrams DO   Continuous Blood Gluc Sensor (FREESTYLE FELY 14 DAY SENSOR) MISC Every 2 weeks 6/23/21   Juana Chang MD   Insulin Pen Needle ([employer] PEN NEEDLES 31G) 31G X 8 MM MISC 1 each by Does not apply route 4 times daily 6/23/21   Juana Chang MD   oxybutynin (DITROPAN-XL) 5 MG extended release tablet take 1 tablet by mouth once daily 3/19/21   Abdifatah Murphy MD   neomycin-polymyxin-dexameth (MAXITROL) 3.5-95101-0.1 ophthalmic suspension instill 1 drop into both eyes four times a day 1/5/21   Historical Provider, MD   OXYGEN 2 lit O2 with sleep , please give O2 concentrator 6/12/20   Chaka Kern MD   FreeStyle Lancets MISC 1 each by Does not apply route daily 1/30/20   Juana Chang MD   folic acid (FOLVITE) 1 MG tablet Take 1 mg by mouth daily    Historical Provider, MD   Iron Polysacch [id number]-[id number] (NIFEREX-150 FORTE PO) Take 1 tablet by mouth daily     Historical Provider, MD   Cyanocobalamin (VITAMIN B-12) 1000 MCG extended release tablet Take 1,000 mcg by mouth daily    Historical Provider, MD       Scheduled Meds:   sodium chloride flush  5-40 mL IntraVENous 2 times per day    enoxaparin  40 mg SubCUTAneous Daily    metoprolol succinate  50 mg Oral Daily    isosorbide dinitrate  20 mg Oral TID    insulin glargine  50 Units SubCUTAneous Nightly    insulin lispro  0-12 Units SubCUTAneous TID WC    insulin lispro  0-6 Units SubCUTAneous Nightly    aspirin  81 mg Oral Daily    atorvastatin  80 mg Oral Nightly    docusate sodium  100 mg Oral BID    folic acid  1 mg Oral Daily    gabapentin  600 mg Oral BID    haloperidol  5 mg Oral Daily    hydrALAZINE  50 mg Oral 3 times per day    levothyroxine  50 mcg Oral Daily    montelukast  10 mg Oral Nightly    ranolazine  1,000 mg Oral BID    sertraline  200 mg Oral Daily    meclizine  25 mg Oral BID    ticagrelor  90 mg Oral BID    oxybutynin  5 mg Oral Nightly     Continuous Infusions:   sodium chloride      dextrose       PRN Meds:sodium chloride flush, sodium chloride, ondansetron **OR** ondansetron, polyethylene glycol, acetaminophen **OR** acetaminophen, glucose, dextrose, glucagon (rDNA), dextrose, hydrOXYzine    Allergies   Allergen Reactions    Ambien [Zolpidem Tartrate]     Capoten [Captopril]     Clioquinol     Cogentin [Benztropine]     Depakote [Divalproex Sodium]     Effexor Xr [Venlafaxine Hcl Er]     Geodon [Ziprasidone Hcl]     Lisinopril      Hyperkalemia: 4/21/20 potassium was 6.7    Lyrica [Pregabalin]     Navane [Thiothixene]     Pamelor [Nortriptyline Hcl]     Remeron [Mirtazapine]     Risperdal [Risperidone]     Trazodone And Nefazodone     Wellbutrin [Bupropion]        Social History     Socioeconomic History    Marital status:      Spouse name: Not on file    Number of children: Not on file    Years of education: Not on file    Highest education level: Not on file   Occupational History    Not on file   Tobacco Use    Smoking status: Never Smoker    Smokeless tobacco: Never Used   Vaping Use    Vaping Use: Never used   Substance and Sexual Activity    Alcohol use: Never    Drug use: Never    Sexual activity: Not on file   Other Topics Concern    Not on file   Social History Narrative         Lives With: Spouse    Type of Home: [address] [place] [address] [address]: One level    Home Access: Elevator    Bathroom Shower/Tub: Tub/Shower unit(Simultaneous filing. User may not have seen previous data.)    Bathroom Equipment: Grab bars in shower, Shower chair    Home Equipment: Rolling walker, [address] 450 bed    ADL Assistance: Needs assistance    Homemaking Assistance: Needs assistance    Homemaking Responsibilities: No    Ambulation Assistance: Independent    Transfer Assistance: Independent    Active : No    Additional Comments: Pt wears orthopedic shoes at baseline    The patient states she is current with [facility](RN per the ).   The patient had a walker, but obtained a hospital bed, wheel chair, BSC and O2 last admission (from [address]). pharmacy is [address] [address] [place]. Transportation is provided by [employer] () per the patient     Social Determinants of Health     Financial Resource Strain:     Difficulty of Paying Living Expenses: Not on file   Food Insecurity:     Worried About [address] in the Last Year: Not on file    Shayy of Food in the Last Year: Not on file   Transportation Needs:     Lack of Transportation (Medical): Not on file    Lack of Transportation (Non-Medical): Not on file   Physical Activity:     Days of Exercise per Week: Not on file    Minutes of Exercise per Session: Not on file   Stress:     Feeling of Stress : Not on file   Social Connections:     Frequency of Communication with Friends and Family: Not on file    Frequency of Social Gatherings with Friends and Family: Not on file    Attends Congregation Services: Not on file    Active Member of [address] [place] [address] or Organizations: Not on file    Attends Club or Organization Meetings: Not on file    Marital Status: Not on file   Intimate Partner Violence:     Fear of Current or Ex-Partner: Not on file    Emotionally Abused: Not on file    Physically Abused: Not on file    Sexually Abused: Not on file   Housing Stability:     Unable to Pay for Housing in the Last Year: Not on file    Number of [place] in the Last Year: Not on file    Unstable Housing in the Last Year: Not on file       No family history on file. Review Of Systems:    14 point ROS negative other than mentioned.      Physical Exam:    CURRENT VITALS: /63   Pulse 69   Temp 98 °F (36.7 °C) (Oral)   Resp 16   Ht 5' 4\" (1.626 m)   Wt 232 lb (105.2 kg)   SpO2 96%   BMI 39.82 kg/m²     CONSTITUTIONAL:  awake, alert, cooperative, no apparent distress,   ENT:  Normocephalic, without obvious abnormality, atraumatic, sinuses nontender on palpation, external ears without lesions,  NECK:  Supple, symmetrical, trachea midline, no adenopathy, thyroid symmetric, not enlarged and no tenderness, skin normal, No bruits. LUNGS:  No increased work of breathing, good air exchange, clear to auscultation bilaterally, no crackles, no wheezing. Reproducible Chest Pain with palpation. CARDIOVASCULAR:  Decreeased apical impulse, regular rate and rhythm, normal S1 and S2,  2/6 Systolic murmur noted. ABDOMEN:  Obese, normal bowel sounds, soft, non-distended, non-tender, no masses palpated, no hepatosplenomegally  EXTREMETIES: No edema, Pulses Strong Thruout. No ulcers. NEUROLOGIC:  Awake, alert, oriented to name, place and time. Following all commands and moving all extremties.   SKIN:  no bruising or bleeding, normal skin color, texture, turgor and no rashes    Labs:  Recent Results (from the past 24 hour(s))   POCT Glucose    Collection Time: 11/15/21  4:24 PM   Result Value Ref Range    POC Glucose 147 (H) 60 - 115 mg/dl    Performed on ACCU-CHEK    POCT Glucose    Collection Time: 11/15/21  8:32 PM   Result Value Ref Range    POC Glucose 239 (H) 60 - 115 mg/dl    Performed on ACCU-CHEK    POCT Glucose    Collection Time: 11/16/21  6:31 AM   Result Value Ref Range    POC Glucose 135 (H) 60 - 115 mg/dl    Performed on ACCU-CHEK    CBC auto differential    Collection Time: 11/16/21  6:49 AM   Result Value Ref Range    WBC 9.1 4.8 - 10.8 K/uL    RBC 3.76 (L) 4.20 - 5.40 M/uL    Hemoglobin 9.7 (L) 12.0 - 16.0 g/dL    Hematocrit 28.5 (L) 37.0 - 47.0 %    MCV 75.8 (L) 82.0 - 100.0 fL    MCH 25.9 (L) 27.0 - 31.3 pg    MCHC 34.2 33.0 - 37.0 %    RDW 17.3 (H) 11.5 - 14.5 %    Platelets 367 974 - 862 K/uL    Neutrophils % 64.1 %    Lymphocytes % 19.5 %    Monocytes % 12.6 %    Eosinophils % 3.1 %    Basophils % 0.7 %    Neutrophils Absolute 5.9 1.4 - 6.5 K/uL    Lymphocytes Absolute 1.8 1.0 - 4.8 K/uL    Monocytes Absolute 1.1 (H) 0.2 - 0.8 K/uL    Eosinophils Absolute 0.3 0.0 - 0.7 K/uL    Basophils Absolute 0.1 0.0 - 0.2 K/uL Comprehensive Metabolic Panel    Collection Time: 11/16/21  6:49 AM   Result Value Ref Range    Sodium 133 (L) 135 - 144 mEq/L    Potassium 4.5 3.4 - 4.9 mEq/L    Chloride 96 95 - 107 mEq/L    CO2 26 20 - 31 mEq/L    Anion Gap 11 9 - 15 mEq/L    Glucose 119 (H) 70 - 99 mg/dL    BUN 44 (H) 8 - 23 mg/dL    CREATININE 1.97 (H) 0.50 - 0.90 mg/dL    GFR Non-African American 25.5 (L) >60    GFR  30.9 (L) >60    Calcium 8.7 8.5 - 9.9 mg/dL    Total Protein 7.6 6.3 - 8.0 g/dL    Albumin 3.5 3.5 - 4.6 g/dL    Total Bilirubin 0.5 0.2 - 0.7 mg/dL    Alkaline Phosphatase 92 40 - 130 U/L    ALT 19 0 - 33 U/L    AST 19 0 - 35 U/L    Globulin 4.1 (H) 2.3 - 3.5 g/dL   Magnesium    Collection Time: 11/16/21  6:49 AM   Result Value Ref Range    Magnesium 2.4 1.7 - 2.4 mg/dL   Troponin    Collection Time: 11/16/21  6:49 AM   Result Value Ref Range    Troponin 0.014 (HH) 0.000 - 0.010 ng/mL   Sedimentation Rate    Collection Time: 11/16/21  6:49 AM   Result Value Ref Range    Sed Rate 80 (H) 0 - 30 mm   High sensitivity CRP    Collection Time: 11/16/21  6:49 AM   Result Value Ref Range    CRP High Sensitivity 78.3 (H) 0.0 - 5.0 mg/L   POCT Glucose    Collection Time: 11/16/21 11:13 AM   Result Value Ref Range    POC Glucose 175 (H) 60 - 115 mg/dl    Performed on ACCU-CHEK        ECG:     Normal sinus rhythm  Low voltage QRS  Incomplete right bundle branch block  Septal infarct , age undetermined  ST & T wave abnormality, consider inferolateral ischemia  Abnormal ECG         Leslie Anderson MD  [facility] Cardiologist      Electronically signed on 11/14/21 at 12:46 PM EST      -----  Last [facility] OFFICE Note:      Subjective  PCP: [facility]   Subjective:   07/14/2021 - Jennifer Muniz was seen in cardiac evaluation at the [facility] office 07/14/2021. The patients problems are listed in the impression below. Electronic medical records reviewed.     Patient returns. She feels well currently. She no longer has any chest pain. She is asymptomatic. Patient denies Chest Pain, SOB, Lightheadedness, Dizziness, TIA or CVA symptoms. No CHF or Edema. No Palpitations. No GI,  or Bleeding Issues. No Recent Fever or Chills. Cardiovascular and general review of systems is otherwise negative. A 14-system review is otherwise negative, other than noted. ELECTROCARDIOGRAM: Sinus rhythm, poor wave enter progression, first-degree AV block. Rate 66. CARDIAC TESTING: None    LABORATORY DATA: Cholesterol 122, triglyceride 118, HDL 47, LDL 51. Chem-7, CBC, liver function is normal except for hemoglobin 7.5, creatinine 1.2, GFR 44, magnesium 1.9. Otherwise as noted below. All above testing was personally reviewed. PHYSICAL EXAMINATION:   General: No acute distress. Vital signs as noted. Alert and oriented. Head And Neck Examination: No jugular venous distention, no carotid bruits, no mass. Carotid upstrokes preserved. Oral mucosa moist. No xanthelasma. Head and neck examination otherwise unremarkable. Lungs: Clear to auscultation and percussion. No wheezes, no rales, and no rhonchi. Chest: Excursion appeared to be normal. No chest wall tenderness on palpation. Lateral incisional scar. Heart: Normal S1 and S2. No S3. No S4. No rub. Grade 2/6 systolic murmur, best heard at the left sternal border. Point of maximal impulse was within normal limits. Abdomen: Soft. Nontender. No organomegaly. No bruits. No masses. Obese. Extremities: No bipedal edema. No clubbing. No cyanosis. Pulses are diminished throughout. No bruits. Right lower extremity foot/toe infection. Musculoskeletal Exam: No ulcers, otherwise unremarkable. Neuro: Neurologically appeared grossly intact. IMPRESSION:     Cardiovascular status stable  Chest pain, reproducible, musculoskeletal, noncardiac, resolved.   CAD post CABG June 2019 [facility], LIMA to LAD, PCI to left circumflex and right coronary artery, last April 2020. Post catheterization April 2021, medical treatment advised. Ischemic cardiomyopathy with apical hypokinesia LVEF 50%. PVOD post right lower extremity revascularization,  and [facility] Dr Ravin Zuñiga, (details not available), Right third toe amputation site infection. Carotid artery disease with ultrasound noted 50-69% bilateral stenoses May 2019. Syncope, presyncope  History of heart failure  Hypothyroidism  Hypertension  Hyperlipidemia  Diabetes  Depression  Schizophrenia  Family history of coronary artery disease  Multiple allergies as noted  Otherwise as per assessment below. RECOMMENDATIONS:     Patient continues to do well overall. Would suggest that she continue her current medications. Refills were provided. She did discussed possible bariatric surgery and from a cardiovascular standpoint she be acceptable candidate with low cardiovascular risk. Exercise dietary weight reduction program was encouraged. Hydration. Follow my health portal was encouraged. We will plan to see back in 6 months with Laboratory Studies and ECG as noted below. Patient will follow up with their primary physician for general care. The patient knows to contact medical care earlier if need be. Liudmila Vu MD, [address] / [facility] Cardiology      Of Note:  [employer] voice recognition dictation software was utilized partially in the preparation of this note, therefore, inaccuracies in spelling, word choice and punctuation may have occurred which were not recognized the time of signing. Chief Complaint  Cardiology Reason for Visit_NOH: 3M EKG        Active Problems   1. Abnormal EKG (794.31) (R94.31)   2. CAD in native artery (414.01) (I25.10)   · Post CABG LIMA LAD 6/19  DTW,      post LCX PCI 4/20.   3. Carotid atherosclerosis (433.10) (I65.29)   · US Biolateral 50-69% Dz 6/19   4. CKD (chronic kidney disease), stage III (585.3) (N18.30)   5. Depression (311) (F32.9)   6. Diabetes (250.00) (E11.9)   7. HTN (hypertension) (401.9) (I10)   8. Hyperlipidemia (272.4) (E78.5)   9. Hypothyroidism (244.9) (E03.9)   10. Ischemic cardiomyopathy (414.8) (I25.5)   · 50% with ANT Moderate HK. 11. Mitral regurgitation (424.0) (I34.0)   · 3+   12. Morbid obesity (278.01) (E66.01)   13. Never a smoker   14. PVD (peripheral vascular disease) (443.9) (I73.9)   · Post RIght Bypass 6/19  DTW   15. Schizo-affective psychosis (295.70) (F25.9)    Family History   1. Family history of diabetes mellitus (V18.0) (Z83.3) : Mother, Sister   2. Family history of Hodgkin's lymphoma (V16.7) (Z80.7) : Father   3. Family history of hypertension (V17.49) (Z82.49) : Mother, Father, Sister, Brother   4. Family history of myocardial infarction (V17.3) (Z82.49) : Father   5. Family history of Lupus : Sister    Social History   · Daily caffeine consumption, 1 serving a day   · Never a smoker   · No alcohol use   · No illicit drug use    Surgical History   1. History of Toe amputation   2. History of Tooth extraction    Current Meds   1. Albuterol Sulfate  MCG/ACT AERS; INHALE 1 PUFF EVERY 4 HOURS AS   NEEDED; Therapy: (Phil Rojas) to Recorded   2. Ammonium Lactate 12 % External Lotion; APPLY  AND RUB  IN A THIN FILM TO   AFFECTED AREAS TWICE DAILY. (AM AND PM); Therapy: 12Jan2021 to Recorded   3. Artificial Tears 1.4 % Ophthalmic Solution; INSTILL 1-2 DROPS INTO AFFECTED EYE(S)    4 TIMES DAILY AS DIRECTED; Therapy: [id number] to Recorded   4. Aspirin 81 MG Oral Tablet Delayed Release; TAKE 1 TABLET DAILY  Requested for:   32Chb3712; Last Rx:[id number] Ordered   5. Atorvastatin Calcium 80 MG Oral Tablet; TAKE 1 TABLET AT BEDTIME; Therapy: 01Bqo4315 to (Evaluate:53Gkp0151)  Requested for: 12Jor0448; Last   Rx:[id number] Ordered   6. Brilinta 90 MG Oral Tablet; TAKE 1 TABLET TWICE DAILY; Therapy: 23Scp5297 to (Evaluate:79Ruq9079)  Requested for: 88Xzg2431; Last   Rx:[id number] Ordered   7. Buprenorphine 7.5 MCG/HR Transdermal Patch Weekly; Therapy: 48PFP7208 to Recorded   8. Diclofenac Sodium 1 % External Gel; APPLY SPARINGLY TO AFFECTED AREA(S) ONCE   DAILY; Therapy: 29QJB3695 to Recorded   9.  MG Oral Capsule; take 1 cap daily; Therapy: 63NQG4034 to Recorded   10. Doxepin HCl - 10 MG Oral Capsule; TAKE 1 TO 2 CAPSULES AT BEDTIME AS NEEDED    FOR ITCHING; Therapy: 71ZEG6149 to Recorded   11. Furosemide 40 MG Oral Tablet; Take one tab daily; Therapy: 45Etj3992 to (Evaluate:79Lrm0314)  Requested for: 70Tvo4319; Last    Rx:[id number] Ordered   12. Gabapentin 400 MG Oral Capsule; 1 Cap in the morning 2 at Night; Therapy: (Recorded:35Lkd5780) to Recorded   13. Haloperidol 5 MG Oral Tablet; TAKE 1 TABLET TWICE DAILY; Therapy: (Recorded:[id number]) to Recorded   14. hydrALAZINE HCl - 50 MG Oral Tablet; TAKE 1 TABLET 3 TIMES DAILY; Therapy: 66Rey0738 to (Evaluate:47Jjh9245)  Requested for: 07Wdc2114; Last    Rx:[id number] Ordered   15. hydrOXYzine HCl - 25 MG Oral Tablet; TAKE 1 TABLET 3 TIMES DAILY AS NEEDED; Therapy: (Megan Buffy) to Recorded   16. Isosorbide Dinitrate 20 MG Oral Tablet; TAKE 1 TABLET BY MOUTH EVERY 8 HOURS; Therapy: 90Ucv8321 to (Evaluate:10Kah0952)  Requested for: 63Eit8629; Last    Rx:[id number] Ordered   17. Lantus 100 UNIT/ML Subcutaneous Solution; INJECT SUBCUTANEOUSLY AS    DIRECTED; Therapy: (Megan Buffy) to Recorded   18. Levothyroxine Sodium 50 MCG Oral Tablet; 1 TABLET DAILY; Therapy: 77Rih6989 to Recorded   19. Meclizine HCl - 25 MG Oral Tablet; TAKE 1 TABLET 3 TIMES DAILY AS NEEDED; Therapy: (Megan Buffy) to Recorded   20. Metoprolol Succinate ER 50 MG Oral Tablet Extended Release 24 Hour; TAKE 1 TABLET    TWICE A DAY  Requested for: 14VPG8732; Last Rx:[id number] Ordered   21. Montelukast Sodium 10 MG Oral Tablet; TAKE 1 TABLET AT BEDTIME; Therapy: (Megan Buffy) to Recorded   22.  Nitroglycerin 0.4 MG Sublingual Tablet Sublingual; PLACE 1 TABLET UNDER THE    TONGUE EVERY 5 MINUTES FOR UP TO 3 DOSES AS NEEDED FOR CHEST    PAIN. CALL 911 IF PAIN PERSISTS  Requested for: 06QKL5776; Last Rx:[id number]    Ordered   23. Prazosin HCl - 2 MG Oral Capsule; TAKE 1 CAPSULE EVERY 12 HOURS DAILY; Therapy: (Maryellen Manuel) to Recorded   24. Ranolazine  MG Oral Tablet Extended Release 12 Hour; TAKE 1 TABLET EVERY    12 HOURS; Therapy: 14JJE0146 to Recorded   25. Restasis 0.05 % Ophthalmic Emulsion; INSTILL 1 DROP IN [facility] EYE TWICE DAILY; Therapy: 88HCV5033 to Recorded   26. Sertraline HCl - 100 MG Oral Tablet; TAKE 2 TABLETS DAILY; Therapy: (Maryellen Manuel) to Recorded   27. Trulicity 4.67 WL/9.5LD Subcutaneous Solution Pen-injector; Inject 0.5Ml subcu nightly; Therapy: 91VPJ4367 to Recorded    Reviewed meds with PT list.DS MA     Allergies   1. Ambien TABS   nervousness; Insomnia; Recorded By: Callie Romo; 04/17/2019 3:02:20 PM   2. Capoten TABS   Cough; Palpitations; Recorded By: Callie Romo; 04/17/2019 3:02:20 PM   3. Cogentin   Palpitations; Recorded By: Callie Romo; 04/17/2019 3:02:20 PM   4. Geodon CAPS   vision problems; Sleeplessness; Recorded By: Callie Romo; 04/17/2019 3:02:20 PM   5. KlonoPIN TABS   Palpitations; Recorded By: Callie Romo; 04/17/2019 3:02:20 PM   6. Navane CAPS   Headache; Recorded By: Callie Romo; 04/17/2019 3:02:20 PM   7. Remeron   Nausea; Vomiting; Recorded By: Callie Romo; 04/17/2019 3:02:20 PM   8. RisperDAL TABS   Nausea; Recorded By: Callie Romo; 04/17/2019 3:02:20 PM   9. SEROquel TABS   Palpitations; Recorded By: Callie Romo; 04/17/2019 3:02:20 PM   10. Abilify   Recorded By: Raúl Muller; 11/08/2019 11:02:43 AM   [facility]   unsteady, falls; Recorded By: Callie Romo; 04/17/2019 3:02:20 PM   12. Effexor TABS   patient states she felt high on medication;  Recorded By: Callie Romo; 04/17/2019 3:02:20 PM    Immunizations  FLU --- Series1: [id number] Must: 01-Oct-2020   PCV --- Series1: 01-Oct-2019     Vitals  Vital Signs   Recorded: 08GUY7643 01:59PM   Heart Rate: 66  Recorded: 32KXZ3069 01:46PM   Height: 5 ft 1 in  Weight: 240 lb   BMI Calculated: 45.35 kg/m2  BSA Calculated: 2.04  Blood Pressure: 122 / 78, RUE, Sitting  Falls Screening: No falls within the past year    Results/Data  Complete Blood Count 84Phx4256 10:46AM [facility]     Test Name Result Flag Reference   White Blood Cells 7.5 K/uL  4.8-10.8   RBC 4.77 M/uL  4.20-5.40   HGB 12.3 g/dL  12.0-16.0   HCT 36.0 % L 37.0-47.0   MCV 75.5 fL L 82.0-100.0   MCH 25.8 pg L 27.0-31.3   MCHC 34.2 %  33.0-37.0   Platelet Count 808 K/uL  130-400   RDW 16.8 % H 11.5-14.5     Hemoglobin A1C 01Jul2021 10:46AM [facility]     Test Name Result Flag Reference   Hemoglobin A1C 7.5 % H 4.8-5.9     Basic Metabolic Profile 20RKF2089 10:46AM [facility]     Test Name Result Flag Reference   Sodium 136 mEq/L  135-144   Potassium 4.7 mEq/L  3.4-4.9   Chloride 99 mEq/L     Co2 26 mEq/L  20-31   BUN 18 mg/dL  8-23   Creatinine 1.22 mg/dL H 0.50-0.90   Calcium 9.7 mg/dL  8.5-9.9   Glucose 177 mg/dL H 70-99   GAP 11 mEq/L  9-15   Glomerular Filtration Ratio 44.4 L >60   Glomerular Filtration Ratio_AA 53.8 L >60     Lipid Panel 65Mnv9140 10:46AM [facility]     Test Name Result Flag Reference   LDL Cholesterol (Calculat 51 mg/dL  0-129   Triglycerides 118 mg/dL  0-150   Cholesterol 122 mg/dL  0-199   HDL Cholesterol 47 mg/dL  40-59     Magnesium 92Euf7643 10:46AM [facility]     Test Name Result Flag Reference   Magnesium 1.9 mg/dL  1.7-2.4     TSH 01Jul2021 10:46AM [facility]     Test Name Result Flag Reference   TSH 1.470 uIU/mL  0.440-3.860     LIVER PANEL 01Jul2021 10:46AM [facility]     Test Name Result Flag Reference   ALKALINE PHOSPHATASE 100 U/L     AST 18 U/L  0-35   ALT 14 U/L  0-33   BILIRUBIN,TOTAL 0.3 mg/dL  0.2-0.7   Bilirubin, Direct <0.2 mg/dL  0.0-0.4   PROTEIN, TOTAL 7.2 g/dL  6.3-8.0   ALBUMIN 3.7 g/dL  3.5-4.6   Bilirubin, Indirect see below mg/dL  0.0-0.6       Past Medical History   1. History of Foot ulcer, right (707.15) (L97.519)   2. H/O shortness of breath (V13.89) (Z87.898)   3. History of angina pectoris (V12.59) (Z86.79)   4. History of chest pain (V13.89) (Z87.898)   5. History of chest pain (V13.89) (Z87.898)   6. History of intermittent claudication (V12.50) (Z86.79)   7. History of palpitations (V12.59) (Z87.898)   8. History of Lightheadedness (780.4) (R42)   9. History of Preoperative cardiovascular examination (V72.81) (Z01.810)    Procedure    EKG done in office today; :   .     Assessment   1. CAD in native artery (414.01) (I25.10)   2. Carotid atherosclerosis (433.10) (I65.29)   3. Ischemic cardiomyopathy (414.8) (I25.5)   4. Mitral regurgitation (424.0) (I34.0)   5. PVD (peripheral vascular disease) (443.9) (I73.9)   6. HTN (hypertension) (401.9) (I10)   7. Hyperlipidemia (272.4) (E78.5)   8. Hypothyroidism (244.9) (E03.9)   9. Diabetes (250.00) (E11.9)   10. CKD (chronic kidney disease), stage III (585.3) (N18.30)   11. Abnormal EKG (794.31) (R94.31)   12. Depression (311) (F32.9)   13. Morbid obesity (278.01) (E66.01)    Plan   1. Renew: Ranolazine  MG Oral Tablet Extended Release 12 Hour; TAKE 1 TABLET   EVERY 12 HOURS   2. Basic Metabolic Panel; Status:Active; Requested for:17Cej1580;   Call if Potassium result is <3.5 or >5.0 : YES   3. CBC; Status:Active; Requested for:16Hij8791;    4. EKG at next office visit.; Status:Active; Requested for:13Nrp4514;    5. Encouraged regular exercise,improved dietary habits.; Status:Complete;   Done:   64ONV7355   6. Hemoglobin A1C; Status:Active; Requested for:66Kis8565;    7. Lifestyle education regarding diet; Status:Complete;   Done: 14GKQ1694   8. Lipid Panel w/Reflex LDL; Status:Active; Requested for:29Rcx0050;    9. LIVER PANEL; Status:Active;  Requested for:93Lkh6899;    10. Magnesium; Status:Active; Requested for:26Wjo7931;    11. TSH; Status:Active; Requested for:97Lbq3245;    12. 6 month follow-up/call if interval problems. Evaluation and Treatment  Follow-up  Status:    Active  Requested for: 30DTU3595   76. Follow up per family physician. Evaluation and Treatment  Follow-up  Status: Active     Requested for: 69MWN9783   00. Access your medical record, request prescriptions, view laboratory and testing done in    our office, request appointments, view immunization records and message your provider    through our safe and secure patient portal. Ask a staff member how    to register or visit us at [email or web address] to get started today.;    Status:Complete;   Done: 51RQY1512   15. Begin or continue regular aerobic exercise. Gradually work up to at least 3 sessions of    30 minutes of exercise a week.; Status:Active; Requested for:57Pcz5449;    16. Continue same medications/treatment.; Status:Active; Requested for:08Cde2915;    17. Diets that are low in carbohydrates and high in protein are very popular for weight loss.;    Status:Active; Requested for:47Pni5926;    18. Drink plenty of fluids.; Status:Active; Requested for:44Poo9199;    19. Please bring all medicines, vitamins, and herbal supplements with you when you come    to the office.; Status:Active; Requested for:74Ces1308;    20. Please bring any lab results from other providers/physicians to your next appointment.;    Status:Active; Requested for:84Afa6920;    21. Prescriptions will not be filled unless you are compliant with your follow up appointments    or have a follow up appointments scheduled as per the instruction of your physician. Refills for medications should be requested at the time of your office visit.; Status:Active; Requested for:28Fpd6226;    22. Thank you for being a patient at [facility]. Our goal is to    provide high quality medical care. Following today's visit, please check your email for an    invitation to complete our patient satisfaction survey. By sharing your feedback, you will    help us continue our mission to provide excellent medical care. Your participation in the    survey is voluntary and completely confidential. We appreciate your thoughts regarding    today's visit.; Status:Active; Requested for:92Sky6959;    23.  Tobacco use Hx Reported; Status:Complete;   Done: 44IDD5547    Signatures  Electronically signed by : Diallo Brand MA; Jul 14 2021  1:48PM EST                        Electronically signed by : Daniel Jean, ; Jul 14 2021  2:01PM EST                        Electronically signed by : Aniya Johns LPN; Jul 14 5837  7:64FW EST                        Electronically signed by : Jasmine Burch M.D.; Jul 22 2021 11:38PM EST

## 2021-11-16 NOTE — PROGRESS NOTES
Physician Progress Note      PATIENT:               Kizzy Mistry  [facility] #:                  [id number]  :                       1955  ADMIT DATE:       2021 7:47 PM  [address] [place] DATE:  RESPONDING  PROVIDER #:        Delmar Ricketts DO          QUERY TEXT:    Pt admitted with acute heart failure. Pt noted to have creat 1.30  and   1.97  on . If possible, please document in the progress notes and   discharge summary if you are evaluating and/or treating any of the following: The medical record reflects the following:  Risk Factors: CHF, DM, CKD3, HTN  Clinical Indicators: creat/gfr 1.30/41.2, 1.97/25.5  Treatment: lab monitoring    Defined by Kidney Disease Improving Global Outcomes (KDIGO) clinical practice   guideline for acute kidney injury:?  -Increase in?SCr?by greater than or equal to 0.3 mg/dl within 48?hours;?or?  -Increase or decrease in?SCr?to greater than or equal to 1.5 times baseline,   which is known or presumed to have occurred within the prior 7?days;?or?  -Urine volume < 0.5ml/kg/h for 6 hours? Radha RAMOSN, RN, [facility]  [phone]  Options provided:  -- Acute kidney failure  -- Acute kidney injury  -- Other - I will add my own diagnosis  -- Disagree - Not applicable / Not valid  -- Disagree - Clinically unable to determine / Unknown  -- Refer to Clinical Documentation Reviewer    PROVIDER RESPONSE TEXT:    This patient has an Acute kidney injury.     Query created by: Clare Simms on 2021 11:48 AM      Electronically signed by:  Delmar Ricketts DO 2021 4:47 PM

## 2021-11-16 NOTE — PROGRESS NOTES
Department of Internal Medicine  General Internal Medicine  Attending Progress Note      SUBJECTIVE:  Pt seen and examined. Pt discharge was held yesterday due to patient not feeling back to baseline yet. Pt feels better today and wants to go home, but renal function now worsened so discharge on hold. Spoke to patient at length with tablet . Pt agreeable. She follows up regularly with nephrology and states she has an appointment next week.      OBJECTIVE      Medications    Current Facility-Administered Medications: sodium chloride flush 0.9 % injection 5-40 mL, 5-40 mL, IntraVENous, 2 times per day  sodium chloride flush 0.9 % injection 5-40 mL, 5-40 mL, IntraVENous, PRN  0.9 % sodium chloride infusion, 25 mL, IntraVENous, PRN  ondansetron (ZOFRAN-ODT) disintegrating tablet 4 mg, 4 mg, Oral, Q8H PRN **OR** ondansetron (ZOFRAN) injection 4 mg, 4 mg, IntraVENous, Q6H PRN  polyethylene glycol (GLYCOLAX) packet 17 g, 17 g, Oral, Daily PRN  acetaminophen (TYLENOL) tablet 650 mg, 650 mg, Oral, Q6H PRN **OR** acetaminophen (TYLENOL) suppository 650 mg, 650 mg, Rectal, Q6H PRN  enoxaparin (LOVENOX) injection 40 mg, 40 mg, SubCUTAneous, Daily  metoprolol succinate (TOPROL XL) extended release tablet 50 mg, 50 mg, Oral, Daily  isosorbide dinitrate (ISORDIL) tablet 20 mg, 20 mg, Oral, TID  insulin glargine (LANTUS) injection vial 50 Units, 50 Units, SubCUTAneous, Nightly  insulin lispro (HUMALOG) injection vial 0-12 Units, 0-12 Units, SubCUTAneous, TID WC  insulin lispro (HUMALOG) injection vial 0-6 Units, 0-6 Units, SubCUTAneous, Nightly  glucose (GLUTOSE) 40 % oral gel 15 g, 15 g, Oral, PRN  dextrose 50 % IV solution, 12.5 g, IntraVENous, PRN  glucagon (rDNA) injection 1 mg, 1 mg, IntraMUSCular, PRN  dextrose 5 % solution, 100 mL/hr, IntraVENous, PRN  aspirin EC tablet 81 mg, 81 mg, Oral, Daily  atorvastatin (LIPITOR) tablet 80 mg, 80 mg, Oral, Nightly  docusate sodium (COLACE) capsule 100 mg, 100 mg, Oral, BID  folic acid (FOLVITE) tablet 1 mg, 1 mg, Oral, Daily  gabapentin (NEURONTIN) capsule 600 mg, 600 mg, Oral, BID  haloperidol (HALDOL) tablet 5 mg, 5 mg, Oral, Daily  hydrALAZINE (APRESOLINE) tablet 50 mg, 50 mg, Oral, 3 times per day  hydrOXYzine (VISTARIL) capsule 50 mg, 50 mg, Oral, TID PRN  levothyroxine (SYNTHROID) tablet 50 mcg, 50 mcg, Oral, Daily  montelukast (SINGULAIR) tablet 10 mg, 10 mg, Oral, Nightly  ranolazine (RANEXA) extended release tablet 1,000 mg, 1,000 mg, Oral, BID  sertraline (ZOLOFT) tablet 200 mg, 200 mg, Oral, Daily  meclizine (ANTIVERT) tablet 25 mg, 25 mg, Oral, BID  ticagrelor (BRILINTA) tablet 90 mg, 90 mg, Oral, BID  oxybutynin (DITROPAN-XL) extended release tablet 5 mg, 5 mg, Oral, Nightly  Physical    VITALS:  /63   Pulse 69   Temp 98 °F (36.7 °C) (Oral)   Resp 16   Ht 5' 4\" (1.626 m)   Wt 232 lb (105.2 kg)   SpO2 96%   BMI 39.82 kg/m²   Constitutional: Awake and alert in no acute distress.  Sitting in bed comfortably, speaks Vincentian -  tablet used  Head: Normocephalic, atraumatic  Eyes: EOMI, PERRLA  ENT: moist mucous membranes  Neck: neck supple, trachea midline  Lungs: Good inspiratory effort, CTABL, no wheeze, no rhonchi, no rales  Heart: RRR, normal S1 and S2  GI: Soft, non-distended, non tender, no guarding, no rebound, +BS  MSK: no edema noted  Skin: warm, dry  Psych: appropriate affect     Data    CBC:   Lab Results   Component Value Date    WBC 9.1 11/16/2021    RBC 3.76 11/16/2021    HGB 9.7 11/16/2021    HCT 28.5 11/16/2021    MCV 75.8 11/16/2021    MCH 25.9 11/16/2021    MCHC 34.2 11/16/2021    RDW 17.3 11/16/2021     11/16/2021     CMP:    Lab Results   Component Value Date     11/16/2021    K 4.5 11/16/2021    K 4.9 08/26/2021    CL 96 11/16/2021    CO2 26 11/16/2021    BUN 44 11/16/2021    CREATININE 1.97 11/16/2021    GFRAA 30.9 11/16/2021    LABGLOM 25.5 11/16/2021    GLUCOSE 119 11/16/2021    GLUCOSE 279 01/06/2020    PROT 7.6 11/16/2021    LABALBU 3.5 11/16/2021    LABALBU <7.0 01/06/2020    CALCIUM 8.7 11/16/2021    BILITOT 0.5 11/16/2021    ALKPHOS 92 11/16/2021    AST 19 11/16/2021    ALT 19 11/16/2021       ASSESSMENT AND PLAN      # Acute on chronic systolic and diastolic CHF    - home meds resumed     - back to baseline - off O2 today     - cardiology consulted for their opinion- holding diuretics today     # Chest pain    - improving; cardiology consulted; trending troponins; flat pattern     # RADHA on CKD3   - likely due to diuresis - holding today   - renally dose meds     # Hypothyroidism    - continue home synthroid once med reconciled     # Depression    - home meds once reconciled     # DMII    - lantus with preprandial and SSI    Disposition: Pt set for discharge yesterday. Today with RADHA. Holding diuretics. If improved tomorrow, will plan on discharge.       Nadine Beckwith, DO  Internal Medicine

## 2021-11-17 NOTE — PROGRESS NOTES
Nutrition Education    Provided patient with Setswana-Language written material regarding Low Sodium Guidelines for CHF.   RD contact number also provided    Electronically signed by Zehra Capps RD, JACQUE on 11/17/21 at 1:14 PM EST

## 2021-11-17 NOTE — PROGRESS NOTES
[facility] Progress Note        Date:   2021    Patient:    Dunia Smoker    :    1955  CSN:    [id number]    Rounding Cardiologist: Leslie Anderson MD     Primary Cardiologist: Igor Jimenez MD [facility]    Requesting Physician:  Yvrose Centeno DO      Reason for Initial Consult:  SOB, Chest pain, possible CHF    Subjective:    No Changes over nite. Feels well. Anticipated DC possibly in AM.    Lying flat with Orthopnea or distress. No Edema. 14 system General and Cardiac ROS otherwise negative or unchanged. Assessment:    1. SOB  2. Chest discomfort, chronic, reproducible, musculoskeletal  3. Elevated troponins, low flat pattern, not acute true myocardial infarction, due to demand type 2 MI.  4. CHF Systollic, Acute on Chronic. 5. Coronary artery disease, post prior CABG, prior left main left circumflex stent, known 100% right coronary occlusion chronic, last cath  with patent stent and bypass, medical treatment. 6. Ischemic Cardiomyopathy, with anterior hypokinesis, LVEF 35-40% by echo, 56% by Myoview .  7. Negative Lexiscan Myoview stress test for significant MI or Ischemia, Normal LV function, LVEF 56%, 21. 8. Abnormal electrocardiogram with first-degree AV block and anterior MI.  9. Right bundle branch block, new, alternating, now resolved. 10. Hypertension  11. Hyperlipidemia  12. Renal insufficiency, chronic  13. Anemia  14. Pulmonary hypertension  15. Peripheral vascular disease, history of right toe amputation. 16. Schizoaffective disorder  17. Family history of CAD    Plan:    1. Cardiac Supportive Care  2. Continue prior Medications. 3. OK to DC home form [facility].   4. Follow up with [facility] [address] with eventual outpatient stress test if symptoms persist.  5. Cardiology sign off.  6. See Orders  7.    ======================    HISTORY OF PRESENT ILLNESS:      Dunia Smoker is a pleasant 61 y.o. female who presented with mouth daily 9/4/20  Yes Shankar Allison MD   ticagrelor (BRILINTA) 90 MG TABS tablet Take 90 mg by mouth 2 times daily   Yes Historical Provider, MD   CPAP Machine MISC by Does not apply route New CPAP with 10 cm 8/20/20  Yes Savannah Malave MD   aspirin 81 MG EC tablet Take 1 tablet by mouth daily 6/30/20  Yes Patricia Martinez MD   Emollient (EUCERIN INTENSIVE REPAIR HAND) 2.5-10 % CREA APPLY TO FEET AT BEDTIME; PLACE SOCKS OVER FEET AFTER APPLICATION IF NEEDED FOR DRY CRACKING FEET 3/15/20  Yes Historical Provider, MD   hydrOXYzine (VISTARIL) 25 MG capsule Take 50 mg by mouth 3 times daily as needed  3/4/20  Yes Historical Provider, MD   Nutritional Supplements ([address]) LIQD take as directed three times a day 6/1/20  Yes Historical Provider, MD   isosorbide dinitrate (ISORDIL) 20 MG tablet Take 1 tablet by mouth 3 times daily 4/28/20  Yes Patricia Martinez MD   nitroGLYCERIN (NITROSTAT) 0.4 MG SL tablet up to max of 3 total doses. If no relief after 1 dose, call 911. 4/28/20  Yes Patricia Martinez MD   hydrALAZINE (APRESOLINE) 50 MG tablet Take 1 tablet by mouth every 8 hours 4/28/20  Yes Patricia Martinez MD   metoprolol succinate (TOPROL XL) 50 MG extended release tablet Take 1 tablet by mouth 2 times daily 4/28/20  Yes Patricia Martinez MD   haloperidol (HALDOL) 5 MG tablet Take 5 mg by mouth daily   Yes Historical Provider, MD   blood glucose test strips (FREESTYLE LITE) strip 1 each by In Vitro route daily As needed.  1/30/20  Yes David Goodman MD   meclizine (ANTIVERT) 25 MG tablet Take 25 mg by mouth 2 times daily    Yes Historical Provider, MD   Insulin Pen Needle (NOVOFINE) 32G X 6 MM MISC qid 9/22/21   David Goodman MD   ammonium lactate (LAC-HYDRIN) 12 % lotion  6/23/21   Historical Provider, MD   ondansetron (ZOFRAN-ODT) 4 MG disintegrating tablet Take 1 tablet by mouth 3 times daily as needed for Nausea or Vomiting 9/8/21   Myrna Hernandez, DO   Continuous Blood Gluc Sensor (FREESTYLE FELY 14 DAY SENSOR) MISC Every 2 weeks 6/23/21   France Barrow MD   Insulin Pen Needle ([employer] PEN NEEDLES 31G) 31G X 8 MM MISC 1 each by Does not apply route 4 times daily 6/23/21   Frnace Barrow MD   oxybutynin (DITROPAN-XL) 5 MG extended release tablet take 1 tablet by mouth once daily 3/19/21   Srikanth Keene MD   neomycin-polymyxin-dexameth (MAXITROL) 3.5-08183-0.1 ophthalmic suspension instill 1 drop into both eyes four times a day 1/5/21   Historical Provider, MD   OXYGEN 2 lit O2 with sleep , please give O2 concentrator 6/12/20   Shantal Geller MD   FreeStyle Lancets MISC 1 each by Does not apply route daily 1/30/20   France Barrow MD   folic acid (FOLVITE) 1 MG tablet Take 1 mg by mouth daily    Historical Provider, MD   Iron Polysacch Navmw-Q82-SE (NIFEREX-150 FORTE PO) Take 1 tablet by mouth daily     Historical Provider, MD   Cyanocobalamin (VITAMIN B-12) 1000 MCG extended release tablet Take 1,000 mcg by mouth daily    Historical Provider, MD       Scheduled Meds:   sodium chloride flush  5-40 mL IntraVENous 2 times per day    enoxaparin  40 mg SubCUTAneous Daily    metoprolol succinate  50 mg Oral Daily    isosorbide dinitrate  20 mg Oral TID    insulin glargine  50 Units SubCUTAneous Nightly    insulin lispro  0-12 Units SubCUTAneous TID WC    insulin lispro  0-6 Units SubCUTAneous Nightly    aspirin  81 mg Oral Daily    atorvastatin  80 mg Oral Nightly    docusate sodium  100 mg Oral BID    folic acid  1 mg Oral Daily    gabapentin  600 mg Oral BID    haloperidol  5 mg Oral Daily    hydrALAZINE  50 mg Oral 3 times per day    levothyroxine  50 mcg Oral Daily    montelukast  10 mg Oral Nightly    ranolazine  1,000 mg Oral BID    sertraline  200 mg Oral Daily    meclizine  25 mg Oral BID    ticagrelor  90 mg Oral BID    oxybutynin  5 mg Oral Nightly     Continuous Infusions:   sodium chloride      dextrose       PRN Meds:sodium chloride flush, sodium chloride, ondansetron **OR** ondansetron, polyethylene glycol, acetaminophen **OR** acetaminophen, glucose, dextrose, glucagon (rDNA), dextrose, hydrOXYzine    Allergies   Allergen Reactions    Ambien [Zolpidem Tartrate]     Capoten [Captopril]     Clioquinol     Cogentin [Benztropine]     Depakote [Divalproex Sodium]     Effexor Xr [Venlafaxine Hcl Er]     Geodon [Ziprasidone Hcl]     Lisinopril      Hyperkalemia: 4/21/20 potassium was 6.7    Lyrica [Pregabalin]     Navane [Thiothixene]     Pamelor [Nortriptyline Hcl]     Remeron [Mirtazapine]     Risperdal [Risperidone]     Trazodone And Nefazodone     Wellbutrin [Bupropion]        Social History     Socioeconomic History    Marital status:      Spouse name: Not on file    Number of children: Not on file    Years of education: Not on file    Highest education level: Not on file   Occupational History    Not on file   Tobacco Use    Smoking status: Never Smoker    Smokeless tobacco: Never Used   Vaping Use    Vaping Use: Never used   Substance and Sexual Activity    Alcohol use: Never    Drug use: Never    Sexual activity: Not on file   Other Topics Concern    Not on file   Social History Narrative         Lives With: Spouse    Type of Home: [address] [place] [address] in [address]: One level    Home Access: Elevator    Bathroom Shower/Tub: Tub/Shower unit(Simultaneous filing. User may not have seen previous data.)    Bathroom Equipment: Grab bars in shower, Shower chair    Home Equipment: Rolling walker, [employer] 450 bed    ADL Assistance: Needs assistance    Homemaking Assistance: Needs assistance    Homemaking Responsibilities: No    Ambulation Assistance: Independent    Transfer Assistance: Independent    Active : No    Additional Comments: Pt wears orthopedic shoes at baseline    The patient states she is current with [facility](RN per the ).   The patient had a walker, but obtained a hospital bed, wheel chair, BSC and O2 last admission (from [address]). pharmacy is [address] [place] [address],[address] [address]., [place]. Transportation is provided by [employer] () per the patient     Social Determinants of Health     Financial Resource Strain:     Difficulty of Paying Living Expenses: Not on file   Food Insecurity:     Worried About [facility] in the Last Year: Not on file    Shayy of Food in the Last Year: Not on file   Transportation Needs:     Lack of Transportation (Medical): Not on file    Lack of Transportation (Non-Medical): Not on file   Physical Activity:     Days of Exercise per Week: Not on file    Minutes of Exercise per Session: Not on file   Stress:     Feeling of Stress : Not on file   Social Connections:     Frequency of Communication with Friends and Family: Not on file    Frequency of Social Gatherings with Friends and Family: Not on file    Attends Islam Services: Not on file    Active Member of [address] [place] [address] or Organizations: Not on file    Attends Club or Organization Meetings: Not on file    Marital Status: Not on file   Intimate Partner Violence:     Fear of Current or Ex-Partner: Not on file    Emotionally Abused: Not on file    Physically Abused: Not on file    Sexually Abused: Not on file   Housing Stability:     Unable to Pay for Housing in the Last Year: Not on file    Number of [place] in the Last Year: Not on file    Unstable Housing in the Last Year: Not on file       No family history on file. Review Of Systems:    14 point ROS negative other than mentioned.      Physical Exam:    CURRENT VITALS: BP (!) 127/51   Pulse 62   Temp 98.1 °F (36.7 °C) (Oral)   Resp 16   Ht 5' 4\" (1.626 m)   Wt 232 lb (105.2 kg)   SpO2 100%   BMI 39.82 kg/m²     CONSTITUTIONAL:  awake, alert, cooperative, no apparent distress,   ENT:  Normocephalic, without obvious abnormality, atraumatic, sinuses nontender on palpation, external ears without lesions,  NECK:  Supple, symmetrical, trachea midline, no adenopathy, thyroid symmetric, not enlarged and no tenderness, skin normal, No bruits. LUNGS:  No increased work of breathing, good air exchange, clear to auscultation bilaterally, no crackles, no wheezing. Reproducible Chest Pain with palpation. CARDIOVASCULAR:  Decreeased apical impulse, regular rate and rhythm, normal S1 and S2,  2/6 Systolic murmur noted. ABDOMEN:  Obese, normal bowel sounds, soft, non-distended, non-tender, no masses palpated, no hepatosplenomegally  EXTREMETIES: No edema, Pulses Strong Thruout. No ulcers. NEUROLOGIC:  Awake, alert, oriented to name, place and time. Following all commands and moving all extremties.   SKIN:  no bruising or bleeding, normal skin color, texture, turgor and no rashes    Labs:  Recent Results (from the past 24 hour(s))   POCT Glucose    Collection Time: 11/16/21 11:13 AM   Result Value Ref Range    POC Glucose 175 (H) 60 - 115 mg/dl    Performed on ACCU-CHEK    POCT Glucose    Collection Time: 11/16/21  4:19 PM   Result Value Ref Range    POC Glucose 245 (H) 60 - 115 mg/dl    Performed on ACCU-CHEK    POCT Glucose    Collection Time: 11/16/21  8:15 PM   Result Value Ref Range    POC Glucose 246 (H) 60 - 115 mg/dl    Performed on ACCU-CHEK    POCT Glucose    Collection Time: 11/17/21  5:40 AM   Result Value Ref Range    POC Glucose 144 (H) 60 - 115 mg/dl    Performed on ACCU-CHEK    Brain Natriuretic Peptide    Collection Time: 11/17/21  6:28 AM   Result Value Ref Range    Pro-BNP 21,654 pg/mL   CBC auto differential    Collection Time: 11/17/21  6:28 AM   Result Value Ref Range    WBC 9.0 4.8 - 10.8 K/uL    RBC 3.79 (L) 4.20 - 5.40 M/uL    Hemoglobin 9.8 (L) 12.0 - 16.0 g/dL    Hematocrit 29.3 (L) 37.0 - 47.0 %    MCV 77.4 (L) 82.0 - 100.0 fL    MCH 26.0 (L) 27.0 - 31.3 pg    MCHC 33.6 33.0 - 37.0 %    RDW 17.5 (H) 11.5 - 14.5 %    Platelets 031 225 - 262 K/uL    Neutrophils % 74.2 % Lymphocytes % 14.3 %    Monocytes % 10.1 %    Eosinophils % 1.0 %    Basophils % 0.4 %    Neutrophils Absolute 6.7 (H) 1.4 - 6.5 K/uL    Lymphocytes Absolute 1.3 1.0 - 4.8 K/uL    Monocytes Absolute 0.9 (H) 0.2 - 0.8 K/uL    Eosinophils Absolute 0.1 0.0 - 0.7 K/uL    Basophils Absolute 0.0 0.0 - 0.2 K/uL       ECG:     Normal sinus rhythm  Low voltage QRS  Incomplete right bundle branch block  Septal infarct , age undetermined  ST & T wave abnormality, consider inferolateral ischemia  Abnormal ECG         Kymberly Perez MD  [facility] Cardiologist      Electronically signed on 11/14/21 at 12:46 PM EST      -----  Last [facility] OFFICE Note:      Subjective  PCP: [facility]   Subjective:   07/14/2021 - Minh Chen QUEZADA     Breonna Abarca was seen in cardiac evaluation at the [facility] office 07/14/2021. The patients problems are listed in the impression below. Electronic medical records reviewed. Patient returns. She feels well currently. She no longer has any chest pain. She is asymptomatic. Patient denies Chest Pain, SOB, Lightheadedness, Dizziness, TIA or CVA symptoms. No CHF or Edema. No Palpitations. No GI,  or Bleeding Issues. No Recent Fever or Chills. Cardiovascular and general review of systems is otherwise negative. A 14-system review is otherwise negative, other than noted. ELECTROCARDIOGRAM: Sinus rhythm, poor wave enter progression, first-degree AV block. Rate 66. CARDIAC TESTING: None    LABORATORY DATA: Cholesterol 122, triglyceride 118, HDL 47, LDL 51. Chem-7, CBC, liver function is normal except for hemoglobin 7.5, creatinine 1.2, GFR 44, magnesium 1.9. Otherwise as noted below. All above testing was personally reviewed. PHYSICAL EXAMINATION:   General: No acute distress. Vital signs as noted. Alert and oriented. Head And Neck Examination: No jugular venous distention, no carotid bruits, no mass. Carotid upstrokes preserved.  Oral mucosa moist. No xanthelasma. Head and neck examination otherwise unremarkable. Lungs: Clear to auscultation and percussion. No wheezes, no rales, and no rhonchi. Chest: Excursion appeared to be normal. No chest wall tenderness on palpation. Lateral incisional scar. Heart: Normal S1 and S2. No S3. No S4. No rub. Grade 2/6 systolic murmur, best heard at the left sternal border. Point of maximal impulse was within normal limits. Abdomen: Soft. Nontender. No organomegaly. No bruits. No masses. Obese. Extremities: No bipedal edema. No clubbing. No cyanosis. Pulses are diminished throughout. No bruits. Right lower extremity foot/toe infection. Musculoskeletal Exam: No ulcers, otherwise unremarkable. Neuro: Neurologically appeared grossly intact. IMPRESSION:     Cardiovascular status stable  Chest pain, reproducible, musculoskeletal, noncardiac, resolved. CAD post CABG June 2019 [facility], LIMA to LAD, PCI to left circumflex and right coronary artery, last April 2020. Post catheterization April 2021, medical treatment advised. Ischemic cardiomyopathy with apical hypokinesia LVEF 50%. PVOD post right lower extremity revascularization,  and [facility] Dr Donna Rivero, (details not available), Right third toe amputation site infection. Carotid artery disease with ultrasound noted 50-69% bilateral stenoses May 2019. Syncope, presyncope  History of heart failure  Hypothyroidism  Hypertension  Hyperlipidemia  Diabetes  Depression  Schizophrenia  Family history of coronary artery disease  Multiple allergies as noted  Otherwise as per assessment below. RECOMMENDATIONS:     Patient continues to do well overall. Would suggest that she continue her current medications. Refills were provided. She did discussed possible bariatric surgery and from a cardiovascular standpoint she be acceptable candidate with low cardiovascular risk. Exercise dietary weight reduction program was encouraged. Hydration.     Follow my health portal was encouraged. We will plan to see back in 6 months with Laboratory Studies and ECG as noted below. Patient will follow up with their primary physician for general care. The patient knows to contact medical care earlier if need be. Leslie Anderson MD, [facility] Cardiology      Of Note:  WeGush voice recognition dictation software was utilized partially in the preparation of this note, therefore, inaccuracies in spelling, word choice and punctuation may have occurred which were not recognized the time of signing. Chief Complaint  Cardiology Reason for Visit_NOH: 3M EKG        Active Problems   1. Abnormal EKG (794.31) (R94.31)   2. CAD in native artery (414.01) (I25.10)   · Post CABG LIMA LAD 6/19  DTW,      post LCX PCI 4/20.   3. Carotid atherosclerosis (433.10) (I65.29)   · US Biolateral 50-69% Dz 6/19   4. CKD (chronic kidney disease), stage III (585.3) (N18.30)   5. Depression (311) (F32.9)   6. Diabetes (250.00) (E11.9)   7. HTN (hypertension) (401.9) (I10)   8. Hyperlipidemia (272.4) (E78.5)   9. Hypothyroidism (244.9) (E03.9)   10. Ischemic cardiomyopathy (414.8) (I25.5)   · 50% with ANT Moderate HK. 11. Mitral regurgitation (424.0) (I34.0)   · 3+   12. Morbid obesity (278.01) (E66.01)   13. Never a smoker   14. PVD (peripheral vascular disease) (443.9) (I73.9)   · Post RIght Bypass 6/19  DTW   15. Schizo-affective psychosis (295.70) (F25.9)    Family History   1. Family history of diabetes mellitus (V18.0) (Z83.3) : Mother, Sister   2. Family history of Hodgkin's lymphoma (V16.7) (Z80.7) : Father   3. Family history of hypertension (V17.49) (Z82.49) : Mother, Father, Sister, Brother   4. Family history of myocardial infarction (V17.3) (Z82.49) : Father   5. Family history of Lupus : Sister    Social History   · Daily caffeine consumption, 1 serving a day   · Never a smoker   · No alcohol use   · No illicit drug use    Surgical History   1.  History of Toe amputation   2. History of Tooth extraction    Current Meds   1. Albuterol Sulfate  MCG/ACT AERS; INHALE 1 PUFF EVERY 4 HOURS AS   NEEDED; Therapy: (Rm Whitfield) to Recorded   2. Ammonium Lactate 12 % External Lotion; APPLY  AND RUB  IN A THIN FILM TO   AFFECTED AREAS TWICE DAILY. (AM AND PM); Therapy: 12Jan2021 to Recorded   3. Artificial Tears 1.4 % Ophthalmic Solution; INSTILL 1-2 DROPS INTO AFFECTED EYE(S)    4 TIMES DAILY AS DIRECTED; Therapy: 11NTX5319 to Recorded   4. Aspirin 81 MG Oral Tablet Delayed Release; TAKE 1 TABLET DAILY  Requested for:   24Feb2021; Last Rx:[id number] Ordered   5. Atorvastatin Calcium 80 MG Oral Tablet; TAKE 1 TABLET AT BEDTIME; Therapy: 24Feb2021 to (Evaluate:34Xmr7182)  Requested for: 24Feb2021; Last   Rx:[id number] Ordered   6. Brilinta 90 MG Oral Tablet; TAKE 1 TABLET TWICE DAILY; Therapy: 28Apr2020 to (Evaluate:94Kdq0485)  Requested for: 24Feb2021; Last   Rx:24Feb2021 Ordered   7. Buprenorphine 7.5 MCG/HR Transdermal Patch Weekly; Therapy: 31OZK1929 to Recorded   8. Diclofenac Sodium 1 % External Gel; APPLY SPARINGLY TO AFFECTED AREA(S) ONCE   DAILY; Therapy: 46GOE1310 to Recorded   9.  MG Oral Capsule; take 1 cap daily; Therapy: 52VUE4140 to Recorded   10. Doxepin HCl - 10 MG Oral Capsule; TAKE 1 TO 2 CAPSULES AT BEDTIME AS NEEDED    FOR ITCHING; Therapy: 71TUQ4530 to Recorded   11. Furosemide 40 MG Oral Tablet; Take one tab daily; Therapy: 78Mpf3200 to (Evaluate:01Lby1759)  Requested for: 11Mkc0224; Last    Rx:[id number] Ordered   12. Gabapentin 400 MG Oral Capsule; 1 Cap in the morning 2 at Night; Therapy: (Recorded:88Ztf3059) to Recorded   13. Haloperidol 5 MG Oral Tablet; TAKE 1 TABLET TWICE DAILY; Therapy: (Recorded:24Wpr8988) to Recorded   14. hydrALAZINE HCl - 50 MG Oral Tablet; TAKE 1 TABLET 3 TIMES DAILY;     Therapy: 12Uro5069 to (Evaluate:64Rdb9895)  Requested for: 15Xlr6743; Last    Rx:[id number] Ordered   15. hydrOXYzine HCl - 25 MG Oral Tablet; TAKE 1 TABLET 3 TIMES DAILY AS NEEDED; Therapy: (Rollins Govind) to Recorded   16. Isosorbide Dinitrate 20 MG Oral Tablet; TAKE 1 TABLET BY MOUTH EVERY 8 HOURS; Therapy: 38Duv6925 to (Evaluate:51Lwx9116)  Requested for: 94Jwn4520; Last    Rx:[id number] Ordered   17. Lantus 100 UNIT/ML Subcutaneous Solution; INJECT SUBCUTANEOUSLY AS    DIRECTED; Therapy: (Rollins Govind) to Recorded   18. Levothyroxine Sodium 50 MCG Oral Tablet; 1 TABLET DAILY; Therapy: 29Och6088 to Recorded   19. Meclizine HCl - 25 MG Oral Tablet; TAKE 1 TABLET 3 TIMES DAILY AS NEEDED; Therapy: (Rollins Govind) to Recorded   20. Metoprolol Succinate ER 50 MG Oral Tablet Extended Release 24 Hour; TAKE 1 TABLET    TWICE A DAY  Requested for: 28NIU2922; Last Rx:[id number] Ordered   21. Montelukast Sodium 10 MG Oral Tablet; TAKE 1 TABLET AT BEDTIME; Therapy: (Rollins Govind) to Recorded   22. Nitroglycerin 0.4 MG Sublingual Tablet Sublingual; PLACE 1 TABLET UNDER THE    TONGUE EVERY 5 MINUTES FOR UP TO 3 DOSES AS NEEDED FOR CHEST    PAIN. CALL 911 IF PAIN PERSISTS  Requested for: 64ZIN0523; Last Rx:[id number]    Ordered   23. Prazosin HCl - 2 MG Oral Capsule; TAKE 1 CAPSULE EVERY 12 HOURS DAILY; Therapy: (Rollins Govind) to Recorded   24. Ranolazine  MG Oral Tablet Extended Release 12 Hour; TAKE 1 TABLET EVERY    12 HOURS; Therapy: 26HFU4207 to Recorded   25. Restasis 0.05 % Ophthalmic Emulsion; INSTILL 1 DROP IN [facility] EYE TWICE DAILY; Therapy: 87FHZ1586 to Recorded   26. Sertraline HCl - 100 MG Oral Tablet; TAKE 2 TABLETS DAILY; Therapy: (Rollins Govind) to Recorded   27. Trulicity 3.58 FK/0.2GX Subcutaneous Solution Pen-injector; Inject 0.5Ml subcu nightly; Therapy: 67AAS9094 to Recorded    Reviewed meds with PT list.DS MA     Allergies   1. Ambien TABS   nervousness; Insomnia; Recorded By: Krystal Burks; 04/17/2019 3:02:20 PM   2. Capoten TABS   Cough;  Palpitations; Recorded By: Maldonado Mcallister; 04/17/2019 3:02:20 PM   3. Cogentin   Palpitations; Recorded By: Maldonado Mcallister; 04/17/2019 3:02:20 PM   4. Geodon CAPS   vision problems; Sleeplessness; Recorded By: Maldonado Mcallister; 04/17/2019 3:02:20 PM   5. KlonoPIN TABS   Palpitations; Recorded By: Maldonado Mcallister; 04/17/2019 3:02:20 PM   6. Navane CAPS   Headache; Recorded By: Maldonado Mcallister; 04/17/2019 3:02:20 PM   7. Remeron   Nausea; Vomiting; Recorded By: Maldonado Mcallister; 04/17/2019 3:02:20 PM   8. RisperDAL TABS   Nausea; Recorded By: Maldonado Mcallister; 04/17/2019 3:02:20 PM   9. SEROquel TABS   Palpitations; Recorded By: Maldonado Mcallister; 04/17/2019 3:02:20 PM   10. Abilify   Recorded By: Diane Ramey; 11/08/2019 11:02:43 AM   11. Depakote ER TB24   unsteady, falls; Recorded By: Maldonado Mcallister; 04/17/2019 3:02:20 PM   12. Effexor TABS   patient states she felt high on medication;  Recorded By: Maldonado Mcallister; 04/17/2019 3:02:20 PM    Immunizations  FLU --- Lavanda Lord: [id number] Perfect: 01-Oct-2020   PCV --- Series1: 01-Oct-2019     Vitals  Vital Signs   Recorded: 74ZFZ4764 01:59PM   Heart Rate: 66  Recorded: 96SIN6962 01:46PM   Height: 5 ft 1 in  Weight: 240 lb   BMI Calculated: 45.35 kg/m2  BSA Calculated: 2.04  Blood Pressure: 122 / 78, RUE, Sitting  Falls Screening: No falls within the past year    Results/Data  Complete Blood Count [id number] 10:46AM [facility]     Test Name Result Flag Reference   Last Coats Blood Cells 7.5 K/uL  4.8-10.8   RBC 4.77 M/uL  4.20-5.40   HGB 12.3 g/dL  12.0-16.0   HCT 36.0 % L 37.0-47.0   MCV 75.5 fL L 82.0-100.0   MCH 25.8 pg L 27.0-31.3   MCHC 34.2 %  33.0-37.0   Platelet Count 195 K/uL  130-400   RDW 16.8 % H 11.5-14.5     Hemoglobin A1C [id number] 10:46AM [facility]     Test Name Result Flag Reference   Hemoglobin A1C 7.5 % H 4.8-5.9     Basic Metabolic Profile [id number] 10:46AM [facility]     Test Name Result Flag Reference   Sodium 136 mEq/L  135-144   Potassium 4.7 mEq/L  3.4-4.9 Chloride 99 mEq/L     Co2 26 mEq/L  20-31   BUN 18 mg/dL  8-23   Creatinine 1.22 mg/dL H 0.50-0.90   Calcium 9.7 mg/dL  8.5-9.9   Glucose 177 mg/dL H 70-99   GAP 11 mEq/L  9-15   Glomerular Filtration Ratio 44.4 L >60   Glomerular Filtration Ratio_AA 53.8 L >60     Lipid Panel [id number] 10:46AM [facility]     Test Name Result Flag Reference   LDL Cholesterol (Calculat 51 mg/dL  0-129   Triglycerides 118 mg/dL  0-150   Cholesterol 122 mg/dL  0-199   HDL Cholesterol 47 mg/dL  40-59     Magnesium [id number] 10:46AM [facility]     Test Name Result Flag Reference   Magnesium 1.9 mg/dL  1.7-2.4     TSH 01Jul2021 10:46AM SAMI MADISON     Test Name Result Flag Reference   TSH 1.470 uIU/mL  0.440-3.860     LIVER PANEL 01Jul2021 10:46AM SAMI MADISON     Test Name Result Flag Reference   ALKALINE PHOSPHATASE 100 U/L     AST 18 U/L  0-35   ALT 14 U/L  0-33   BILIRUBIN,TOTAL 0.3 mg/dL  0.2-0.7   Bilirubin, Direct <0.2 mg/dL  0.0-0.4   PROTEIN, TOTAL 7.2 g/dL  6.3-8.0   ALBUMIN 3.7 g/dL  3.5-4.6   Bilirubin, Indirect see below mg/dL  0.0-0.6       Past Medical History   1. History of Foot ulcer, right (707.15) (L97.519)   2. H/O shortness of breath (V13.89) (Z87.898)   3. History of angina pectoris (V12.59) (Z86.79)   4. History of chest pain (V13.89) (Z87.898)   5. History of chest pain (V13.89) (Z87.898)   6. History of intermittent claudication (V12.50) (Z86.79)   7. History of palpitations (V12.59) (Z87.898)   8. History of Lightheadedness (780.4) (R42)   9. History of Preoperative cardiovascular examination (V72.81) (Z01.810)    Procedure    EKG done in office today; :   .     Assessment   1. CAD in native artery (414.01) (I25.10)   2. Carotid atherosclerosis (433.10) (I65.29)   3. Ischemic cardiomyopathy (414.8) (I25.5)   4. Mitral regurgitation (424.0) (I34.0)   5. PVD (peripheral vascular disease) (443.9) (I73.9)   6. HTN (hypertension) (401.9) (I10)   7.  Hyperlipidemia (272.4) (E78.5)   8. Hypothyroidism (244.9) (E03.9)   9. Diabetes (250.00) (E11.9)   10. CKD (chronic kidney disease), stage III (585.3) (N18.30)   11. Abnormal EKG (794.31) (R94.31)   12. Depression (311) (F32.9)   13. Morbid obesity (278.01) (E66.01)    Plan   1. Renew: Ranolazine  MG Oral Tablet Extended Release 12 Hour; TAKE 1 TABLET   EVERY 12 HOURS   2. Basic Metabolic Panel; Status:Active; Requested for:28Wsp9088;   Call if Potassium result is <3.5 or >5.0 : YES   3. CBC; Status:Active; Requested for:94Tsd9335;    4. EKG at next office visit.; Status:Active; Requested for:47Oji0098;    5. Encouraged regular exercise,improved dietary habits.; Status:Complete;   Done:   74CEM7607   6. Hemoglobin A1C; Status:Active; Requested for:24Lfj2605;    7. Lifestyle education regarding diet; Status:Complete;   Done: 20VSN9835   8. Lipid Panel w/Reflex LDL; Status:Active; Requested for:03Rol2439;    9. LIVER PANEL; Status:Active; Requested for:48Csk5359;    10. Magnesium; Status:Active; Requested for:35Qlh1464;    11. TSH; Status:Active; Requested for:03Qti4203;    12. 6 month follow-up/call if interval problems. Evaluation and Treatment  Follow-up  Status:    Active  Requested for: 45UST9453   64. Follow up per family physician. Evaluation and Treatment  Follow-up  Status: Active     Requested for: 93ZAL2560   65. Access your medical record, request prescriptions, view laboratory and testing done in    our office, request appointments, view immunization records and message your provider    through our safe and secure patient portal. Ask a staff member how    to register or visit us at [email or web address] to get started today.;    Status:Complete;   Done: 26AQZ4551   15. Begin or continue regular aerobic exercise. Gradually work up to at least 3 sessions of    30 minutes of exercise a week.; Status:Active; Requested for:15Ybm1631;    16. Continue same medications/treatment.; Status:Active;  Requested for:76Lgr3110; 17. Diets that are low in carbohydrates and high in protein are very popular for weight loss.;    Status:Active; Requested for:61Cou1009;    18. Drink plenty of fluids.; Status:Active; Requested for:50Oxc2842;    19. Please bring all medicines, vitamins, and herbal supplements with you when you come    to the office.; Status:Active; Requested for:73Ruj0691;    20. Please bring any lab results from other providers/physicians to your next appointment.;    Status:Active; Requested for:00Tbd9849;    21. Prescriptions will not be filled unless you are compliant with your follow up appointments    or have a follow up appointments scheduled as per the instruction of your physician. Refills for medications should be requested at the time of your office visit.; Status:Active; Requested for:63Dnf7343;    22. Thank you for being a patient at [facility]. Our goal is to    provide high quality medical care. Following today's visit, please check your email for an    invitation to complete our patient satisfaction survey. By sharing your feedback, you will    help us continue our mission to provide excellent medical care. Your participation in the    survey is voluntary and completely confidential. We appreciate your thoughts regarding    today's visit.; Status:Active; Requested for:35Cgr6162;    23.  Tobacco use Hx Reported; Status:Complete;   Done: 35VFY4276    Signatures  Electronically signed by : Isabella Guerrero MA; Jul 14 2021  1:48PM EST                        Electronically signed by : Michelle Person, ; Jul 14 2021  2:01PM EST                        Electronically signed by : Al Mccullough LPN; Jul 14 0168  9:72UY EST                        Electronically signed by : Max Parra M.D.; Jul 22 2021 11:38PM EST

## 2021-11-30 NOTE — ED NOTES
ekg  Completed in [facility]  And compared to previoous ekgs over the past week  Dr Gisselle Newsome reviewed    Pt became started crying when she was told she would be placed back in [facility]       94 Isabella Kim, SALMA  11/30/21 5137

## 2021-11-30 NOTE — ED PROVIDER NOTES
[address] [facility] ED  EMERGENCY MEDICINE     Pt Name: Dunia Rogers  MRN: [id number]  Vonda 1955  Date of evaluation: 11/30/2021  PCP:    Kirstin Wong  Provider: Lesvia Vallejo DO    CHIEF COMPLAINT       Chief Complaint   Patient presents with    Chest Pain     c/o chest ppain has been seen multiple ntimes  by us abnd  ccf for s matt        HISTORY OF PRESENT ILLNESS    HPI       61-year-old female with history of major depressive disorder, hypertension, hyperlipidemia, diabetes, CHF presents to the emergency department with complaint of chest pain that is been going on for a few days. Patient was seen in the emergency department at [facility] 4 days ago and had a full work-up done at that time. Patient states that \"they did not do anything for her at that time\". Patient is also complaining of abdominal pain. She had a CT scan done at that time which was negative for any acute findings. Today, patient states that her symptoms are the same as they were 4 days ago. She is also complaining that she lost discharge her for her CPAP machine and would like a new one. Patient denies any shortness of breath at this time. She states that she has been taking all of her medications as prescribed and directed. Triage notes and Nursing notes were reviewed by myself. Any discrepancies are addressed above.     PAST MEDICAL HISTORY     Past Medical History:   Diagnosis Date    Asthma     CAD (coronary artery disease)     CKD (chronic kidney disease) stage 3, GFR 30-59 ml/min [facility]     Colitis     Diabetes mellitus ([address]     Hyperlipidemia     Hypertension     PAD (peripheral artery disease) ([facility]     Prolonged emergence from general anesthesia     PVD (peripheral vascular disease) ([address]     Thyroid disease        SURGICAL HISTORY       Past Surgical History:   Procedure Laterality Date    CARDIAC SURGERY      CATARACT REMOVAL WITH IMPLANT Bilateral 11- 11-     SECTION      COLONOSCOPY N/A 2019    COLONOSCOPY DIAGNOSTIC performed by Mónica Jaquez MD at [address] GRAFT  2019    unknown vessels    HYSTERECTOMY      TOE AMPUTATION Right     3rd toe       CURRENT MEDICATIONS       Current Discharge Medication List      CONTINUE these medications which have NOT CHANGED    Details   oxyCODONE-acetaminophen (PERCOCET) 5-325 MG per tablet Take 1 tablet by mouth every 4 hours as needed for Pain. Dulaglutide (TRULICITY) 7.50 RC/0.5FJ SOPN Inject 0.75 mg into the skin once a week  Qty: 4 pen, Refills: 3      levothyroxine (SYNTHROID) 50 MCG tablet take 1 tablet by mouth once daily  Qty: 30 tablet, Refills: 5      insulin lispro, 1 Unit Dial, (HUMALOG KWIKPEN) 100 UNIT/ML SOPN 15  units at each meals  Qty: 10 pen, Refills: 03      !! Insulin Pen Needle (NOVOFINE) 32G X 6 MM MISC qid  Qty: 300 each, Refills: 3      ammonium lactate (LAC-HYDRIN) 12 % lotion       atorvastatin (LIPITOR) 80 MG tablet take 1 tablet by mouth at bedtime      diclofenac sodium (VOLTAREN) 1 % GEL apply 4 grams to affected area three times a day AS NEEDED FOR PAIN      doxepin (SINEQUAN) 25 MG capsule 75 mg nightly       insulin glargine (LANTUS SOLOSTAR) 100 UNIT/ML injection pen 50 units at bedtime  Qty: 15 pen, Refills: 3      ondansetron (ZOFRAN-ODT) 4 MG disintegrating tablet Take 1 tablet by mouth 3 times daily as needed for Nausea or Vomiting  Qty: 21 tablet, Refills: 0      ranolazine (RANEXA) 1000 MG extended release tablet Take 1 tablet by mouth 2 times daily  Qty: 60 tablet, Refills: 3      gabapentin (NEURONTIN) 600 MG tablet Take 600 mg by mouth 2 times daily.       albuterol sulfate HFA (VENTOLIN HFA) 108 (90 Base) MCG/ACT inhaler Inhale 2 puffs into the lungs as needed for Wheezing Every 4-6 hours PRN  Qty: 1 Inhaler, Refills: 5    Associated Diagnoses: Mild intermittent asthma without complication      albuterol (PROVENTIL) (2.5 Emollient (EUCERIN INTENSIVE REPAIR HAND) 2.5-10 % CREA APPLY TO FEET AT BEDTIME; PLACE SOCKS OVER FEET AFTER APPLICATION IF NEEDED FOR DRY CRACKING FEET      hydrOXYzine (VISTARIL) 25 MG capsule Take 50 mg by mouth 3 times daily as needed       Nutritional Supplements (GLUCERNA SHAKE) LIQD take as directed three times a day      isosorbide dinitrate (ISORDIL) 20 MG tablet Take 1 tablet by mouth 3 times daily  Qty: 90 tablet, Refills: 3      nitroGLYCERIN (NITROSTAT) 0.4 MG SL tablet up to max of 3 total doses. If no relief after 1 dose, call 911. Qty: 25 tablet, Refills: 3      hydrALAZINE (APRESOLINE) 50 MG tablet Take 1 tablet by mouth every 8 hours  Qty: 90 tablet, Refills: 3      metoprolol succinate (TOPROL XL) 50 MG extended release tablet Take 1 tablet by mouth 2 times daily  Qty: 30 tablet, Refills: 3      haloperidol (HALDOL) 5 MG tablet Take 5 mg by mouth daily      FreeStyle Lancets MISC 1 each by Does not apply route daily  Qty: 100 each, Refills: 3      blood glucose test strips (FREESTYLE LITE) strip 1 each by In Vitro route daily As needed. Qty: 100 each, Refills: 3      folic acid (FOLVITE) 1 MG tablet Take 1 mg by mouth daily      Iron Polysacch Ybdmr-O15-OC (NIFEREX-150 FORTE PO) Take 1 tablet by mouth daily       Cyanocobalamin (VITAMIN B-12) 1000 MCG extended release tablet Take 1,000 mcg by mouth daily      meclizine (ANTIVERT) 25 MG tablet Take 25 mg by mouth 2 times daily        ! ! - Potential duplicate medications found. Please discuss with provider.           ALLERGIES       Allergies   Allergen Reactions    Ambien [Zolpidem Tartrate]     Capoten [Captopril]     Clioquinol     Cogentin [Benztropine]     Depakote [Divalproex Sodium]     Effexor Xr [Venlafaxine Hcl Er]     Geodon [Ziprasidone Hcl]     Lisinopril      Hyperkalemia: 4/21/20 potassium was 6.7    Lyrica [Pregabalin]     Navane [Thiothixene]     Pamelor [Nortriptyline Hcl]     Remeron [Mirtazapine]     Risperdal [Risperidone]     Trazodone And Nefazodone     Wellbutrin [Bupropion]        FAMILY HISTORY     No family history on file. SOCIAL HISTORY       Social History     Socioeconomic History    Marital status:      Spouse name: Not on file    Number of children: Not on file    Years of education: Not on file    Highest education level: Not on file   Occupational History    Not on file   Tobacco Use    Smoking status: Never Smoker    Smokeless tobacco: Never Used   Vaping Use    Vaping Use: Never used   Substance and Sexual Activity    Alcohol use: Never    Drug use: Never    Sexual activity: Not on file   Other Topics Concern    Not on file   Social History Narrative         Lives With: Spouse    Type of Home: [address] [place] [address] [address]: One level    Home Access: Elevator    Bathroom Shower/Tub: Tub/Shower unit(Simultaneous filing. User may not have seen previous data.)    Bathroom Equipment: Grab bars in shower, Shower chair    Home Equipment: Rolling walker, [address] bed    ADL Assistance: Needs assistance    Homemaking Assistance: Needs assistance    Homemaking Responsibilities: No    Ambulation Assistance: Independent    Transfer Assistance: Independent    Active : No    Additional Comments: Pt wears orthopedic shoes at baseline    The patient states she is current with [facility](RN per the ). The patient had a walker, but obtained a hospital bed, wheel chair, BSC and O2 last admission (from [address]). pharmacy is [address] [place] [address],[address] [address]., [facility].       Transportation is provided by [employer]	[place] () per the patient     Social Determinants of Health     Financial Resource Strain:     Difficulty of Paying Living Expenses: Not on file   Food Insecurity:     Worried About [address] in the Last Year: Not on file    Shayy of Food in the Last Year: Not on [employer] [place] Mucous membranes are moist.      Pharynx: No oropharyngeal exudate or posterior oropharyngeal erythema. Comments: Tardive dyskinesia noted  Eyes:      General:         Right eye: No discharge. Left eye: No discharge. Extraocular Movements: Extraocular movements intact. Pupils: Pupils are equal, round, and reactive to light. Cardiovascular:      Rate and Rhythm: Normal rate and regular rhythm. Pulses: Normal pulses. Pulmonary:      Effort: Pulmonary effort is normal.      Breath sounds: Normal breath sounds. Abdominal:      General: Abdomen is flat. Bowel sounds are normal. There is no distension. Tenderness: There is no abdominal tenderness. There is no right CVA tenderness, left CVA tenderness, guarding or rebound. Musculoskeletal:         General: No swelling or tenderness. Normal range of motion. Cervical back: Normal range of motion and neck supple. Right lower leg: Edema (+1 pitting) present. Left lower leg: Edema (+1 pitting) present. Skin:     General: Skin is warm and dry. Capillary Refill: Capillary refill takes less than 2 seconds. Neurological:      General: No focal deficit present. Mental Status: She is alert. Psychiatric:         Mood and Affect: Mood normal.           DIAGNOSTIC RESULTS     EKG:(none if blank)  All EKGs are interpreted by the Emergency Department Physician who either signs or Co-signs thi    EKG performed at 1410 shows sinus rhythm with first-degree AV block. Heart rate of 76 bpm LA interval 232 ms. No significant ST-T wave abnormalities noted. QTC 46 ms. RADIOLOGY: (none if blank)   I directly visualized the following images and reviewed the radiologist interpretations.   Interpretation per the Radiologist below, if available at the time of this note:  No orders to display       LABS:  Labs Reviewed   CBC WITH AUTO DIFFERENTIAL - Abnormal; Notable for the following components:       Result Value    RBC 4.13 (*)     Hemoglobin 10.8 (*)     Hematocrit 31.7 (*)     MCV 76.6 (*)     MCH 26.0 (*)     RDW 19.1 (*)     All other components within normal limits   COMPREHENSIVE METABOLIC PANEL - Abnormal; Notable for the following components:    Sodium 134 (*)     Potassium 5.0 (*)     Glucose 145 (*)     BUN 25 (*)     CREATININE 1.58 (*)     GFR Non- 32.9 (*)     GFR  39.8 (*)     Albumin 3.4 (*)     Globulin 4.5 (*)     All other components within normal limits   TROPONIN   CK       All other labs were within normal range or not returned as of this dictation. Please note, any cultures that may have been sent were not resulted at the time of this patient visit. EMERGENCY DEPARTMENT COURSE and Medical Decision Making:     Vitals:    Vitals:    11/30/21 1403 11/30/21 1801 11/30/21 1817   BP: (!) 151/89 (!) 159/82 (!) 163/78   Pulse: 74 70 75   Resp: 18 19 17   Temp: 98.3 °F (36.8 °C)     SpO2: 99% 95% 95%   Weight:  232 lb (105.2 kg)    Height:  5' 4\" (1.626 m)        PROCEDURES: (None if blank)  Procedures       MDM     Patient's lab work today including troponin were negative. No ischemic changes on EKG. Patient a full work-up done including BNP CT scan of her abdomen and multiple other labs done at the [facility] clinic 4 days ago. She states that symptoms are similar to that time. She states that they \"did not do anything\". I believe a lot of her symptoms may be due to anxiety especially with a negative cardiac work-up. Patient was given a dose of Ativan which did seem to help with her anxiety today. Patient is already on Vistaril. Explained to her to continue taking this and to follow-up with her primary care doctor if she feels the Ativan is working better for her anxiety. I did indicate that she is to follow-up with the pulmonologist for a new CPAP machine. Patient is otherwise in no acute distress, no labored breathing.   Vitals are otherwise stable including an SPO2 of 99% on room air. Patient will be discharged in stable condition. Strict return precautions and follow up instructions were discussed with the patient with which the patient agrees    ED Medications administered this visit:    Medications   LORazepam (ATIVAN) tablet 1 mg (1 mg Oral Given 11/30/21 1815)         FINAL IMPRESSION      1.  Anxiety state          DISPOSITION/PLAN   DISPOSITION Decision To Discharge 11/30/2021 06:11:58 PM      PATIENT REFERRED TO:  [address]  [facility] [address]  [phone]            DISCHARGE MEDICATIONS:  Current Discharge Medication List                 Cuong Lopez DO (electronically signed)  Attending Physician, Emergency Department         Cuong Lopez, [place] [address]

## 2021-11-30 NOTE — ED NOTES
Pt to family room for assessment per Dr. Romero Soldluna with  services utilized.      Josiah Martinez RN  11/30/21 1455

## 2021-11-30 NOTE — ED NOTES
Pt in lobby sitting in chair, waiting for bed, pt a&ox4, skin w/d/tan, pulses palp. 0 distress, 0 c/o.      Renetta Baker RN  11/30/21 0203

## 2021-11-30 NOTE — ED NOTES
Obt. Blood to lab, obt. Via butterfly needle, site rt ac wnl, pt minerva. Well.      Jen Lujan RN  11/30/21 4068

## 2021-12-01 NOTE — TELEPHONE ENCOUNTER
Nurse Access attempted to contact pt. Attempt unsuccessful. No answer. Left message for pt to contact this writer.

## 2021-12-10 NOTE — PROGRESS NOTES
Subjective:     Mandie Ledbetter is a 61 y.o. female who complains today of:     Chief Complaint   Patient presents with    Follow-up     6 Week F/U for  Mild intermittent asthma, Pneumonia and JESICA on CPAP       HPI  She was in hospital  For 6 days , Started on IV Lasix for acute on chronic diastolic CHF on O2. Pt on 3L O2 at home at baseline. Pt diuresed well and was weaned to baseline O2. Cardiology recommended outpatient follow up for stress test and was discharged on 11/15. CXR CHF  C/o shortness of breath  with exertion. No  Wheezing. No Cough with  Sputum. No Hemoptysis. No Chest tightness. No Chest pain with radiation  or pleuritic pain. C/o  leg edema. No orthopnea. No Fever or chills. No Rhinorrhea and postnasal drip. She is using CPAP with  10  centimeters of H2O with heated humidity and 3 lit O2 bled. She is using CPAP for about  3-5  hours every night. She is using CPAP with   Full face Mask. She said  sleep is restful with the CPAP use.     Allergies:  Ambien [zolpidem tartrate], Capoten [captopril], Clioquinol, Cogentin [benztropine], Depakote [divalproex sodium], Effexor xr [venlafaxine hcl er], Geodon [ziprasidone hcl], Lisinopril, Lyrica [pregabalin], Navane [thiothixene], Pamelor [nortriptyline hcl], Remeron [mirtazapine], Risperdal [risperidone], Trazodone and nefazodone, and Wellbutrin [bupropion]  Past Medical History:   Diagnosis Date    Asthma     CAD (coronary artery disease)     CKD (chronic kidney disease) stage 3, GFR 30-59 ml/min (HCC)     Colitis     Diabetes mellitus ([address]     Hyperlipidemia     Hypertension     PAD (peripheral artery disease) ([address]     Prolonged emergence from general anesthesia     [facility] vascular [facility]) [facility]     Thyroid disease      Past Surgical History:   Procedure Laterality Date    CARDIAC SURGERY      CATARACT REMOVAL WITH IMPLANT Bilateral 11- 11-     SECTION      COLONOSCOPY N/A 8/20/2019    COLONOSCOPY DIAGNOSTIC performed by Loy Juarez MD at [address] [facility]  06/2019    unknown vessels    HYSTERECTOMY      TOE AMPUTATION Right     3rd toe     History reviewed. No pertinent family history. Social History     Socioeconomic History    Marital status:      Spouse name: Not on file    Number of children: Not on file    Years of education: Not on file    Highest education level: Not on file   Occupational History    Not on file   Tobacco Use    Smoking status: Never Smoker    Smokeless tobacco: Never Used   Vaping Use    Vaping Use: Never used   Substance and Sexual Activity    Alcohol use: Never    Drug use: Never    Sexual activity: Not on file   Other Topics Concern    Not on file   Social History Narrative         Lives With: Spouse    Type of Home: [address] [place] [address] [address] in [address]: One level    Home Access: Elevator    Bathroom Shower/Tub: Tub/Shower unit(Simultaneous filing. User may not have seen previous data.)    Bathroom Equipment: Grab bars in shower, Shower chair    Home Equipment: Rolling walker, [address] bed    ADL Assistance: Needs assistance    Homemaking Assistance: Needs assistance    Homemaking Responsibilities: No    Ambulation Assistance: Independent    Transfer Assistance: Independent    Active : No    Additional Comments: Pt wears orthopedic shoes at baseline    The patient states she is current with [facility](RN per the ). The patient had a walker, but obtained a hospital bed, wheel chair, BSC and O2 last admission (from [address] [place] [address]). pharmacy is [address] [place] [address],[address] [address]., Della Taylor.       Transportation is provided by [employer]	[place] () per the patient     Social Determinants of Health     Financial Resource Strain:     Difficulty of Paying Living Expenses: Not on file   Food Insecurity:     Worried About Running Out of Food in the Last Year: Not on file    Ran Out of Food in the Last Year: Not on file   Transportation Needs:     Lack of Transportation (Medical): Not on file    Lack of Transportation (Non-Medical): Not on file   Physical Activity:     Days of Exercise per Week: Not on file    Minutes of Exercise per Session: Not on file   Stress:     Feeling of Stress : Not on file   Social Connections:     Frequency of Communication with Friends and Family: Not on file    Frequency of Social Gatherings with Friends and Family: Not on file    Attends Catholic Services: Not on file    Active Member of [address] [place] [address] [employer] or Organizations: Not on file    Attends Club or Organization Meetings: Not on file    Marital Status: Not on file   Intimate Partner Violence:     Fear of Current or Ex-Partner: Not on file    Emotionally Abused: Not on file    Physically Abused: Not on file    Sexually Abused: Not on file   Housing Stability:     Unable to Pay for Housing in the Last Year: Not on file    Number of [place] in the Last Year: Not on file    Unstable Housing in the Last Year: Not on file         Review of Systems   Constitutional: Negative for chills, diaphoresis, fatigue and fever. HENT: Negative for congestion, mouth sores, nosebleeds, postnasal drip, rhinorrhea, sinus pressure, sneezing, sore throat, trouble swallowing and voice change. Eyes: Negative for discharge, itching and visual disturbance. Respiratory: Positive for shortness of breath. Negative for cough, chest tightness and wheezing. Cardiovascular: Positive for leg swelling. Negative for chest pain and palpitations. Gastrointestinal: Negative for abdominal pain, diarrhea, nausea and vomiting. Genitourinary: Negative for difficulty urinating and hematuria. Musculoskeletal: Negative for arthralgias, joint swelling and myalgias. Skin: Negative for rash.    Allergic/Immunologic: Negative for environmental allergies and food allergies. Neurological: Negative for dizziness, tremors, weakness and headaches. Psychiatric/Behavioral: Negative for behavioral problems and sleep disturbance.         :     Vitals:    12/10/21 1245 12/10/21 1313   BP: (!) 111/48 116/65   Site: Left Upper Arm Right Upper Arm   Position: Sitting Sitting   Cuff Size: Large Adult Large Adult   Pulse: 74    Resp: 18    Temp: 97 °F (36.1 °C)    TempSrc: Infrared    SpO2: 99%    Weight: 235 lb 9.6 oz (106.9 kg)    Height: 5' 4\" (1.626 m)      Wt Readings from Last 3 Encounters:   12/10/21 235 lb 9.6 oz (106.9 kg)   11/30/21 232 lb (105.2 kg)   11/14/21 232 lb (105.2 kg)         Physical Exam  Constitutional:       General: She is not in acute distress. Appearance: She is well-developed. She is obese. She is not diaphoretic. HENT:      Head: Normocephalic and atraumatic. Nose: Nose normal.   Eyes:      Pupils: Pupils are equal, round, and reactive to light. Neck:      Thyroid: No thyromegaly. Vascular: No JVD. Trachea: No tracheal deviation. Cardiovascular:      Rate and Rhythm: Normal rate and regular rhythm. Heart sounds: No murmur heard. No friction rub. No gallop. Pulmonary:      Effort: No respiratory distress. Breath sounds: No wheezing or rales. Comments: diminished Breath sound bilaterally. Chest:      Chest wall: No tenderness. Abdominal:      General: There is no distension. Tenderness: There is no abdominal tenderness. There is no rebound. Musculoskeletal:         General: Normal range of motion. Right lower leg: Edema present. Left lower leg: Edema present. Lymphadenopathy:      Cervical: No cervical adenopathy. Skin:     General: Skin is warm and dry. Neurological:      Mental Status: She is alert and oriented to person, place, and time.       Coordination: Coordination normal.         Current Outpatient Medications   Medication Sig Dispense Refill    albuterol sulfate HFA (VENTOLIN mouth once daily 90 tablet 3    RESTASIS 0.05 % ophthalmic emulsion       neomycin-polymyxin-dexameth (MAXITROL) 3.5-04309-0.1 ophthalmic suspension instill 1 drop into both eyes four times a day      ARTIFICIAL TEARS 1.4 % ophthalmic solution       docusate sodium (COLACE, DULCOLAX) 100 MG CAPS Take 100 mg by mouth 2 times daily (Patient taking differently: Take 200 mg by mouth daily At bedtime.) 60 capsule 1    sertraline (ZOLOFT) 100 MG tablet Take 200 mg by mouth daily       Continuous Blood Gluc Sensor (FREESTYLE FELY 14 DAY SENSOR) MISC Every 2 weeks 2 each 06    Continuous Blood Gluc  (FREESTYLE FELY 14 DAY READER) CATHLEEN As directed 1 Device 00    furosemide (LASIX) 40 MG tablet Take 1 tablet by mouth daily 60 tablet 3    ticagrelor (BRILINTA) 90 MG TABS tablet Take 90 mg by mouth 2 times daily      CPAP Machine MISC by Does not apply route New CPAP with 10 cm 1 each 0    aspirin 81 MG EC tablet Take 1 tablet by mouth daily 30 tablet 3    OXYGEN 2 lit O2 with sleep , please give O2 concentrator 1 Units 0    Emollient (EUCERIN INTENSIVE REPAIR HAND) 2.5-10 % CREA APPLY TO FEET AT BEDTIME; PLACE SOCKS OVER FEET AFTER APPLICATION IF NEEDED FOR DRY CRACKING FEET      hydrOXYzine (VISTARIL) 25 MG capsule Take 50 mg by mouth 3 times daily as needed       Nutritional Supplements (GLUCERNA SHAKE) LIQD take as directed three times a day      isosorbide dinitrate (ISORDIL) 20 MG tablet Take 1 tablet by mouth 3 times daily 90 tablet 3    nitroGLYCERIN (NITROSTAT) 0.4 MG SL tablet up to max of 3 total doses.  If no relief after 1 dose, call 911. 25 tablet 3    hydrALAZINE (APRESOLINE) 50 MG tablet Take 1 tablet by mouth every 8 hours 90 tablet 3    metoprolol succinate (TOPROL XL) 50 MG extended release tablet Take 1 tablet by mouth 2 times daily 30 tablet 3    haloperidol (HALDOL) 5 MG tablet Take 5 mg by mouth daily      FreeStyle Lancets MISC 1 each by Does not apply route daily 100 each 3  blood glucose test strips (FREESTYLE LITE) strip 1 each by In Vitro route daily As needed. 871 each 3    folic acid (FOLVITE) 1 MG tablet Take 1 mg by mouth daily      Iron Polysacch [id number] (NIFEREX-150 FORTE PO) Take 1 tablet by mouth daily       Cyanocobalamin (VITAMIN B-12) 1000 MCG extended release tablet Take 1,000 mcg by mouth daily      meclizine (ANTIVERT) 25 MG tablet Take 25 mg by mouth 2 times daily        No current facility-administered medications for this visit. Results for orders placed during the hospital encounter of 11/13/21    XR CHEST (2 VW)    Narrative  XR CHEST (2 VW): 11/16/2021 8:38 AM    CLINICAL HISTORY:  chf sob . COMPARISON: None available. TECHNIQUE: A portable upright AP radiograph of the chest was obtained. FINDINGS:  Status post median sternotomy. The cardiac silhouette is at the upper limits of normal which may be secondary to cardiomegaly versus pericardial effusion. The aorta is ectatic which most commonly correlates with chronic hypertension. There are diffuse increased interstitial  pulmonary markings throughout both lungs which may be secondary to pulmonary edema. Impression  The findings most likely reflect chronic congestive heart failure, CHF. Betito Bucyrus Results for orders placed during the hospital encounter of 10/28/21    XR CHEST (2 VW)    Narrative  EXAMINATION: XR CHEST (2 VW)    CLINICAL HISTORY: RIGHT LOWER LOBE PNEUMONIA    COMPARISONS: AUGUST 21, 2021    FINDINGS: Median sternotomy. Cardiopericardial silhouette normal. Aorta calcified. Pulmonary vasculature normal. Ill-defined areas increase opacity posterior right lung base. Left lung clear. Impression  RIGHT LOWER LUNG ATELECTASIS/PNEUMONIA. Results for orders placed during the hospital encounter of 06/28/20    XR CHEST STANDARD (2 VW)    Narrative  XR CHEST (2 VW) : 6/29/2020    CLINICAL HISTORY:  SOB .    COMPARISON: 6/28/2020.     TECHNIQUE: Upright AP and lateral radiographs of the chest were obtained. FINDINGS:    A shallow inspiration is present without significant infiltrate identified. The heart remains mildly enlarged with postoperative changes from previous CABG. There is no significant pleural effusion, vascular congestion, pneumothorax, or displaced fractures identified. Impression  NO EVIDENCE OF ACTIVE CARDIOPULMONARY DISEASE.  ]  Results for orders placed during the hospital encounter of 11/13/21    XR CHEST PORTABLE    Narrative  Exam: XR CHEST PORTABLE    History: Shortness of breath    Technique: AP portable view of the chest obtained. Comparison: Chest x-rays October 28, 2021    Findings:    Suboptimal inspiration. Evidence of prior thoracic surgery. Atherosclerotic calcification of the thoracic aorta. The cardiomediastinal silhouette is within normal limits. No pneumothorax, pleural effusion, or consolidation. Bibasilar atelectasis and/or  scarring. No acute osseous abnormality. Impression  No radiographic evidence of acute intrathoracic process. Results for orders placed during the hospital encounter of 08/21/21    XR CHEST PORTABLE    Narrative  EXAMINATION: CHEST PORTABLE VIEW    CLINICAL HISTORY: Midsternal chest pain    COMPARISONS: April 6, 2021 0143 hours    FINDINGS:    Single  views of the chest is submitted. There are multiple median sternotomy wires. The cardiac silhouette is enlarged  Pulmonary vascular unremarkable. Right sided trachea. Atelectasis, patchy infiltrate right lower lobe. No Pneumothoraces. Impression  ATELECTASIS, PATCHY INFILTRATE RIGHT LOWER LOBE. Results for orders placed during the hospital encounter of 04/06/21    XR CHEST PORTABLE    Narrative  EXAMINATION: XR CHEST PORTABLE    CLINICAL HISTORY: CHEST    COMPARISONS: SEPTEMBER 29, 2020    FINDINGS: Median sternotomy. Cardiopericardial silhouette enlarged and unchanged. Left cardiac margin has downward outward prominence, unchanged.  Lungs clear.    Impression  STABLE CARDIOMEGALY WITH RADIOGRAPHIC SIGNS OF LEFT VENTRICULAR ENLARGEMENT, UNCHANGED.  ]  Results for orders placed during the hospital encounter of 09/27/19    XR CHEST (SINGLE VIEW FRONTAL)    Narrative  EXAMINATION: XR CHEST (SINGLE VIEW FRONTAL)    CLINICAL HISTORY: Z91.89 At risk for ineffective breathing ICD10    COMPARISONS: None available. FINDINGS: Single AP portable view the chest obtained on September 27, 2019 at 1715 hours. There is mild cardiomegaly without vascular congestion pulmonary edema or pleural effusion. The mediastinum is not widened or shifted. The calcified aorta is not  dilated. Sternotomy sutures are present. The chest wall is unremarkable. CONCLUSION: NO ACUTE PROCESS      Assessment/Plan:     1. Mild intermittent asthma without complication  C/o shortness of breath  with exertion. No  Wheezing. No Cough with  Sputum. C/o  leg edema. - albuterol sulfate HFA (VENTOLIN HFA) 108 (90 Base) MCG/ACT inhaler; Inhale 2 puffs into the lungs as needed for Wheezing Every 4-6 hours PRN  Dispense: 1 each; Refill: 3  - albuterol (PROVENTIL) (2.5 MG/3ML) 0.083% nebulizer solution; Take 3 mLs by nebulization every 6 hours as needed for Wheezing  Dispense: 120 each; Refill: 3  - montelukast (SINGULAIR) 10 MG tablet; Take 1 tablet by mouth nightly  Dispense: 30 tablet; Refill: 3    2. Obesity (BMI 30-39. 9)  She is advised try to lose weight. obesity related risk explained to the patient ,  Current weight:  235 lb 9.6 oz (106.9 kg) Lbs. BMI:  Body mass index is 40.44 kg/m². Suggested weight control approaches, including dietary changes , exercise, behavioral modification. 3. JESICA (obstructive sleep apnea)  She is using CPAP with  10  centimeters of H2O with heated humidity and 3 lit O2 bled. She is using CPAP for about   4-5  hours every night. She is using CPAP with   Full face Mask. She said  sleep is restful with the CPAP use.     4. Nocturnal hypoxia  Continue O2 to keep saturation 90% above     5. Congestive heart failure, unspecified HF chronicity, unspecified heart failure type ([address]  She was in hospital  For 6 days , Started on IV Lasix for acute on chronic diastolic CHF on O2. Pt on 3L O2 at home at baseline. Pt diuresed well and was weaned to baseline O2. Cardiology recommended outpatient follow up for stress test and was discharged on 11/15. CXR CHF. She is following with cardiology        Return in about 4 months (around 4/10/2022) for asthma, chronic respiratory failure.       Georgina Pastor MD

## 2021-12-14 PROBLEM — R79.89 ELEVATED TROPONIN: Status: RESOLVED | Noted: 2021-01-01 | Resolved: 2021-01-01

## 2021-12-14 PROBLEM — R77.8 ELEVATED TROPONIN: Status: RESOLVED | Noted: 2021-01-01 | Resolved: 2021-01-01

## 2021-12-30 NOTE — PROGRESS NOTES
12/30/2021    Assessment:       Diagnosis Orders   1. Type 2 diabetes mellitus with hyperglycemia, with long-term current use of insulin (HCC)  Alcohol Swabs (ALCOHOL PREP) 70 % PADS    blood glucose test strips (FREESTYLE LITE) strip    Continuous Blood Gluc Sensor (FREESTYLE FELY 14 DAY SENSOR) MISC    Continuous Blood Gluc  (FREESTYLE FELY 14 DAY READER) CATHLEEN    Dulaglutide (TRULICITY) 9.04 NV/0.7XX SOPN    FreeStyle Lancets MISC    insulin glargine (LANTUS SOLOSTAR) 100 UNIT/ML injection pen    insulin lispro, 1 Unit Dial, (HUMALOG KWIKPEN) 100 UNIT/ML SOPN    Insulin Pen Needle ([employer] PEN NEEDLES 31G) 31G X 8 MM MISC    POCT Glucose    Hemoglobin M7P    Basic Metabolic Panel   2. Hypothyroidism, unspecified type  levothyroxine (SYNTHROID) 50 MCG tablet    T4, Free    TSH with Reflex   3.  Iron deficiency anemia, unspecified iron deficiency anemia type  Amb External Referral To Oncology         PLAN:     Orders Placed This Encounter   Procedures    Hemoglobin A1C     Standing Status:   Future     Standing Expiration Date:   12/30/2022    Basic Metabolic Panel     Standing Status:   Future     Standing Expiration Date:   12/30/2022    T4, Free     Standing Status:   Future     Standing Expiration Date:   12/30/2022    TSH with Reflex     Standing Status:   Future     Standing Expiration Date:   12/30/2022    Amb External Referral To Oncology     Referral Priority:   Routine     Referral Type:   Consult for Advice and Opinion     Referral Reason:   Specialty Services Required     Referral Location:   [address] [place] [address]     Referred to Provider:   Yvrose Epstein DO     Requested Specialty:   Hematology and Oncology     Number of Visits Requested:   1    POCT Glucose     Continue current dose of insulin continue current dose of levothyroxine  Refer patient to hematology for anemia  Orders Placed This Encounter   Medications    Alcohol Swabs (ALCOHOL PREP) 70 % PADS     Sig: qid Dispense:  300 each     Refill:  06    blood glucose test strips (FREESTYLE LITE) strip     Si each by In Vitro route daily As needed.      Dispense:  100 each     Refill:  3    Continuous Blood Gluc Sensor (FREESTYLE FELY 14 DAY SENSOR) MISC     Sig: Every 2 weeks     Dispense:  2 each     Refill:  06    Continuous Blood Gluc  (FREESTYLE FELY 14 DAY READER) CATHLEEN     Sig: As directed     Dispense:  2 each     Refill:  3    Dulaglutide (TRULICITY) 6.94 DN/8.8HQ SOPN     Sig: Inject 0.75 mg into the skin once a week     Dispense:  4 pen     Refill:  3    FreeStyle Lancets MISC     Si each by Does not apply route daily     Dispense:  100 each     Refill:  3    insulin glargine (LANTUS SOLOSTAR) 100 UNIT/ML injection pen     Si units at bedtime     Dispense:  15 pen     Refill:  3    insulin lispro, 1 Unit Dial, (HUMALOG KWIKPEN) 100 UNIT/ML SOPN     Sig: 15  units at each meals     Dispense:  10 pen     Refill:  03    Insulin Pen Needle ([employer] PEN NEEDLES 31G) 31G X 8 MM MISC     Si each by Does not apply route 4 times daily     Dispense:  300 each     Refill:  3    levothyroxine (SYNTHROID) 50 MCG tablet     Sig: take 1 tablet by mouth once daily     Dispense:  30 tablet     Refill:  5         Orders Placed This Encounter   Procedures    POCT Glucose       Subjective:     Chief Complaint   Patient presents with    Hypothyroidism    Diabetes    3 Month Follow-Up     Vitals:    21 1502 21 1509   BP: (!) 148/77 (!) 148/70   Pulse: 78    Resp: 18    SpO2: 98%    Weight: 227 lb (103 kg)    Height: 5' 4\" (1.626 m)      Wt Readings from Last 3 Encounters:   21 227 lb (103 kg)   12/10/21 235 lb 9.6 oz (106.9 kg)   21 232 lb (105.2 kg)     BP Readings from Last 3 Encounters:   21 (!) 148/70   12/10/21 116/65   21 (!) 163/78     Follow-up on type 2 diabetes hypothyroidism patient also using freestyle fely currently on Lantus 50 units at bedtime plus Humalog 15 with each meals history of anemia does complain of fatigue has not been evaluated by hematologist possible etiology chronic kidney disease complicated diabetes include heart disease  Hemoglobin A1c was 7.5    Diabetes  She presents for her follow-up diabetic visit. She has type 2 diabetes mellitus. Her disease course has been stable. Diabetic complications include nephropathy. Current diabetic treatment includes insulin injections. She is currently taking insulin pre-breakfast, pre-lunch, pre-dinner and at bedtime. Her overall blood glucose range is 140-180 mg/dl.  (Lab Results       Component                Value               Date                       LABA1C                   7.3 (H)             2021            )     Past Medical History:   Diagnosis Date    Asthma     CAD (coronary artery disease)     CKD (chronic kidney disease) stage 3, GFR 30-59 ml/min ([facility])     Colitis     Diabetes mellitus ([facility]     Hyperlipidemia     Hypertension     PAD (peripheral artery disease) ([facility])     Prolonged emergence from general anesthesia     PVD (peripheral vascular disease) ([facility]     Thyroid disease      Past Surgical History:   Procedure Laterality Date    CARDIAC SURGERY      CATARACT REMOVAL WITH IMPLANT Bilateral 11- 11-     SECTION      COLONOSCOPY N/A 2019    COLONOSCOPY DIAGNOSTIC performed by Chantell Menjivar MD at [address] [place] [address] GRAFT  2019    unknown vessels    HYSTERECTOMY      TOE AMPUTATION Right     3rd toe     Social History     Socioeconomic History    Marital status:      Spouse name: Not on file    Number of children: Not on file    Years of education: Not on file    Highest education level: Not on file   Occupational History    Not on file   Tobacco Use    Smoking status: Never Smoker    Smokeless tobacco: Never Used   Vaping Use    Vaping Use: Never used   Substance and Sexual Activity    Alcohol use: Never    Drug use: Never    Sexual activity: Not on file   Other Topics Concern    Not on file   Social History Narrative         Lives With: Spouse    Type of Home: [address] [place] [address] [address]: One level    Home Access: Elevator    Bathroom Shower/Tub: Tub/Shower unit(Simultaneous filing. User may not have seen previous data.)    Bathroom Equipment: Grab bars in shower, Shower chair    Home Equipment: Rolling walker, [address] 450 bed    ADL Assistance: Needs assistance    Homemaking Assistance: Needs assistance    Homemaking Responsibilities: No    Ambulation Assistance: Independent    Transfer Assistance: Independent    Active : No    Additional Comments: Pt wears orthopedic shoes at baseline    The patient states she is current with [facility](RN per the ). The patient had a walker, but obtained a hospital bed, wheel chair, BSC and O2 last admission (from [address]). pharmacy is [address] [place] [address],[address] [facility], [place]. Transportation is provided by  Home	[place] () per the patient     Social Determinants of Health     Financial Resource Strain:     Difficulty of Paying Living Expenses: Not on file   Food Insecurity:     Worried About [facility] in the Last Year: Not on file    Shayy of Food in the Last Year: Not on file   Transportation Needs:     Lack of Transportation (Medical): Not on file    Lack of Transportation (Non-Medical):  Not on file   Physical Activity:     Days of Exercise per Week: Not on file    Minutes of Exercise per Session: Not on file   Stress:     Feeling of Stress : Not on file   Social Connections:     Frequency of Communication with Friends and Family: Not on file    Frequency of Social Gatherings with Friends and Family: Not on file    Attends [employer]ism Services: Not on file    Active Member of Clubs or Organizations: Not on file    Attends Club or Organization Meetings: Not on file    Marital Status: Not on file   Intimate Partner Violence:     Fear of Current or Ex-Partner: Not on file    Emotionally Abused: Not on file    Physically Abused: Not on file    Sexually Abused: Not on file   Housing Stability:     Unable to Pay for Housing in the Last Year: Not on file    Number of [place] in the Last Year: Not on file    Unstable Housing in the Last Year: Not on file     No family history on file.   Allergies   Allergen Reactions    Ambien [Zolpidem Tartrate]     Capoten [Captopril]     Clioquinol     Cogentin [Benztropine]     Depakote [Divalproex Sodium]     Effexor Xr [Venlafaxine Hcl Er]     Geodon [Ziprasidone Hcl]     Lisinopril      Hyperkalemia: 4/21/20 potassium was 6.7    Lyrica [Pregabalin]     Navane [Thiothixene]     Pamelor [Nortriptyline Hcl]     Remeron [Mirtazapine]     Risperdal [Risperidone]     Trazodone And Nefazodone     Wellbutrin [Bupropion]        Current Outpatient Medications:     atorvastatin (LIPITOR) 40 MG tablet, take 2 tablets by mouth daily, Disp: , Rfl:     busPIRone (BUSPAR) 10 MG tablet, , Disp: , Rfl:     doxepin (SINEQUAN) 100 MG capsule, , Disp: , Rfl:     prazosin (MINIPRESS) 2 MG capsule, , Disp: , Rfl:     albuterol sulfate HFA (VENTOLIN HFA) 108 (90 Base) MCG/ACT inhaler, Inhale 2 puffs into the lungs as needed for Wheezing Every 4-6 hours PRN, Disp: 1 each, Rfl: 3    albuterol (PROVENTIL) (2.5 MG/3ML) 0.083% nebulizer solution, Take 3 mLs by nebulization every 6 hours as needed for Wheezing, Disp: 120 each, Rfl: 3    montelukast (SINGULAIR) 10 MG tablet, Take 1 tablet by mouth nightly, Disp: 30 tablet, Rfl: 3    oxyCODONE-acetaminophen (PERCOCET) 5-325 MG per tablet, Take 1 tablet by mouth every 4 hours as needed for Pain., Disp: , Rfl:     Dulaglutide (TRULICITY) 0.93 MD/7.0JB SOPN, Inject 0.75 mg into the skin once a week (Patient taking differently: Inject 0.75 mg into the skin once a week On Mondays.), Disp: 4 pen, Rfl: 3    levothyroxine (SYNTHROID) 50 MCG tablet, take 1 tablet by mouth once daily, Disp: 30 tablet, Rfl: 5    insulin lispro, 1 Unit Dial, (HUMALOG KWIKPEN) 100 UNIT/ML SOPN, 15  units at each meals, Disp: 10 pen, Rfl: 03    Insulin Pen Needle (NOVOFINE) 32G X 6 MM MISC, qid, Disp: 300 each, Rfl: 3    ammonium lactate (LAC-HYDRIN) 12 % lotion, , Disp: , Rfl:     atorvastatin (LIPITOR) 80 MG tablet, take 1 tablet by mouth at bedtime, Disp: , Rfl:     diclofenac sodium (VOLTAREN) 1 % GEL, apply 4 grams to affected area three times a day AS NEEDED FOR PAIN, Disp: , Rfl:     doxepin (SINEQUAN) 25 MG capsule, 75 mg nightly , Disp: , Rfl:     insulin glargine (LANTUS SOLOSTAR) 100 UNIT/ML injection pen, 50 units at bedtime, Disp: 15 pen, Rfl: 3    ondansetron (ZOFRAN-ODT) 4 MG disintegrating tablet, Take 1 tablet by mouth 3 times daily as needed for Nausea or Vomiting, Disp: 21 tablet, Rfl: 0    ranolazine (RANEXA) 1000 MG extended release tablet, Take 1 tablet by mouth 2 times daily, Disp: 60 tablet, Rfl: 3    gabapentin (NEURONTIN) 600 MG tablet, Take 600 mg by mouth 2 times daily. , Disp: , Rfl:     Continuous Blood Gluc Sensor (FREESTYLE FELY 14 DAY SENSOR) MISC, Every 2 weeks, Disp: 2 each, Rfl: 06    Insulin Pen Needle ([employer] PEN NEEDLES 31G) 31G X 8 MM MISC, 1 each by Does not apply route 4 times daily, Disp: 300 each, Rfl: 3    Alcohol Swabs (ALCOHOL PREP) 70 % PADS, qid, Disp: 300 each, Rfl: 06    oxybutynin (DITROPAN-XL) 5 MG extended release tablet, take 1 tablet by mouth once daily, Disp: 90 tablet, Rfl: 3    RESTASIS 0.05 % ophthalmic emulsion, , Disp: , Rfl:     neomycin-polymyxin-dexameth (MAXITROL) 3.5-15823-8.1 ophthalmic suspension, instill 1 drop into both eyes four times a day, Disp: , Rfl:     ARTIFICIAL TEARS 1.4 % ophthalmic solution, , Disp: , Rfl:     docusate sodium (COLACE, DULCOLAX) 100 MG CAPS, Take 100 mg by mouth 2 times daily (Patient taking differently: Take 200 mg by mouth daily At bedtime.), Disp: 60 capsule, Rfl: 1    sertraline (ZOLOFT) 100 MG tablet, Take 200 mg by mouth daily , Disp: , Rfl:     Continuous Blood Gluc Sensor (FREESTYLE FELY 14 DAY SENSOR) MISC, Every 2 weeks, Disp: 2 each, Rfl: 06    Continuous Blood Gluc  (FREESTYLE FELY 14 DAY READER) CATHLEEN, As directed, Disp: 1 Device, Rfl: 00    furosemide (LASIX) 40 MG tablet, Take 1 tablet by mouth daily, Disp: 60 tablet, Rfl: 3    ticagrelor (BRILINTA) 90 MG TABS tablet, Take 90 mg by mouth 2 times daily, Disp: , Rfl:     CPAP Machine MISC, by Does not apply route New CPAP with 10 cm, Disp: 1 each, Rfl: 0    aspirin 81 MG EC tablet, Take 1 tablet by mouth daily, Disp: 30 tablet, Rfl: 3    OXYGEN, 2 lit O2 with sleep , please give O2 concentrator, Disp: 1 Units, Rfl: 0    Emollient (EUCERIN INTENSIVE REPAIR HAND) 2.5-10 % CREA, APPLY TO FEET AT BEDTIME; PLACE SOCKS OVER FEET AFTER APPLICATION IF NEEDED FOR DRY CRACKING FEET, Disp: , Rfl:     hydrOXYzine (VISTARIL) 25 MG capsule, Take 50 mg by mouth 3 times daily as needed , Disp: , Rfl:     Nutritional Supplements (GLUCERNA SHAKE) LIQD, take as directed three times a day, Disp: , Rfl:     isosorbide dinitrate (ISORDIL) 20 MG tablet, Take 1 tablet by mouth 3 times daily, Disp: 90 tablet, Rfl: 3    nitroGLYCERIN (NITROSTAT) 0.4 MG SL tablet, up to max of 3 total doses.  If no relief after 1 dose, call 911., Disp: 25 tablet, Rfl: 3    hydrALAZINE (APRESOLINE) 50 MG tablet, Take 1 tablet by mouth every 8 hours, Disp: 90 tablet, Rfl: 3    metoprolol succinate (TOPROL XL) 50 MG extended release tablet, Take 1 tablet by mouth 2 times daily, Disp: 30 tablet, Rfl: 3    haloperidol (HALDOL) 5 MG tablet, Take 5 mg by mouth daily, Disp: , Rfl:     FreeStyle Lancets MISC, 1 each by Does not apply route daily, Disp: 100 each, Rfl: 3    blood glucose test strips (FREESTYLE LITE) strip, 1 each by In Vitro route daily As needed. , Disp: 100 each, Rfl: 3    folic acid (FOLVITE) 1 MG tablet, Take 1 mg by mouth daily, Disp: , Rfl:     Iron Polysacch Oyyma-I58-OH (NIFEREX-150 FORTE PO), Take 1 tablet by mouth daily , Disp: , Rfl:     Cyanocobalamin (VITAMIN B-12) 1000 MCG extended release tablet, Take 1,000 mcg by mouth daily, Disp: , Rfl:     meclizine (ANTIVERT) 25 MG tablet, Take 25 mg by mouth 2 times daily , Disp: , Rfl:   Lab Results   Component Value Date     12/17/2021    K 4.6 12/17/2021     12/17/2021    CO2 27 12/17/2021    BUN 20 12/17/2021    CREATININE 1.05 (H) 12/17/2021    GLUCOSE 186 (A) 12/30/2021    CALCIUM 9.3 12/17/2021    PROT 7.9 12/08/2021    LABALBU 3.7 12/08/2021    BILITOT 0.7 12/08/2021    ALKPHOS 85 12/08/2021    AST 20 12/08/2021    ALT 16 12/08/2021    LABGLOM 52.7 (L) 12/17/2021    GFRAA >60.0 12/17/2021    GLOB 4.5 (H) 11/30/2021     Lab Results   Component Value Date    WBC 7.5 12/08/2021    HGB 10.7 (L) 12/08/2021    HCT 32.6 (L) 12/08/2021    MCV 78.5 (L) 12/08/2021     12/08/2021     Lab Results   Component Value Date    LABA1C 7.3 (H) 12/08/2021    LABA1C 7.2 (H) 09/09/2021    LABA1C 7.5 (H) 07/01/2021     Lab Results   Component Value Date    HDL 44 12/08/2021    HDL 33 (L) 11/15/2021    HDL 40 08/26/2021    LDLCALC 63 12/08/2021    LDLCALC 65 11/15/2021    LDLCALC 36 08/26/2021    CHOL 122 12/08/2021    CHOL 114 11/15/2021    CHOL 88 08/26/2021    TRIG 74 12/08/2021    TRIG 80 11/15/2021    TRIG 60 08/26/2021     No results found for: TESTM  Lab Results   Component Value Date    TSH 1.470 07/01/2021    TSH 1.390 04/06/2021    TSH 1.420 12/01/2020    TSHREFLEX 3.670 12/08/2021    TSHREFLEX 1.970 09/09/2021    TSHREFLEX 1.870 06/08/2021    T4FREE 1.45 12/08/2021    T4FREE 1.71 (H) 09/09/2021    T4FREE 1.44 06/08/2021     No results found for: TPOABS    Review of Systems   Endocrine: Negative. All other systems reviewed and are negative.       Objective:   Physical Exam  Vitals reviewed. Constitutional:       General: She is not in acute distress. Appearance: Normal appearance. She is obese. HENT:      Head: Normocephalic and atraumatic. Hair is normal.      Right Ear: External ear normal.      Left Ear: External ear normal.      Nose: Nose normal.   Eyes:      General: No scleral icterus. Right eye: No discharge. Left eye: No discharge. Extraocular Movements: Extraocular movements intact. Conjunctiva/sclera: Conjunctivae normal.   Neck:      Trachea: Trachea normal.   Cardiovascular:      Rate and Rhythm: Normal rate. Pulmonary:      Effort: Pulmonary effort is normal.   Musculoskeletal:         General: Normal range of motion. Cervical back: Normal range of motion and neck supple. Neurological:      General: No focal deficit present. Mental Status: She is alert and oriented to person, place, and time.    Psychiatric:         Mood and Affect: Mood normal.         Behavior: Behavior normal.

## 2022-01-01 ENCOUNTER — APPOINTMENT (OUTPATIENT)
Dept: CT IMAGING | Age: 67
DRG: 263 | End: 2022-01-01
Payer: COMMERCIAL

## 2022-01-01 ENCOUNTER — APPOINTMENT (OUTPATIENT)
Dept: ULTRASOUND IMAGING | Age: 67
DRG: 263 | End: 2022-01-01
Payer: COMMERCIAL

## 2022-01-01 ENCOUNTER — ANESTHESIA (OUTPATIENT)
Dept: OPERATING ROOM | Age: 67
DRG: 263 | End: 2022-01-01
Payer: COMMERCIAL

## 2022-01-01 ENCOUNTER — OFFICE VISIT (OUTPATIENT)
Dept: GERIATRIC MEDICINE | Age: 67
End: 2022-01-01
Payer: COMMERCIAL

## 2022-01-01 ENCOUNTER — OFFICE VISIT (OUTPATIENT)
Dept: ENDOCRINOLOGY | Age: 67
End: 2022-01-01
Payer: COMMERCIAL

## 2022-01-01 ENCOUNTER — APPOINTMENT (OUTPATIENT)
Dept: NUCLEAR MEDICINE | Age: 67
DRG: 263 | End: 2022-01-01
Payer: COMMERCIAL

## 2022-01-01 ENCOUNTER — APPOINTMENT (OUTPATIENT)
Dept: GENERAL RADIOLOGY | Age: 67
DRG: 263 | End: 2022-01-01
Payer: COMMERCIAL

## 2022-01-01 ENCOUNTER — HOSPITAL ENCOUNTER (EMERGENCY)
Age: 67
Discharge: HOME | DRG: 466 | End: 2022-05-17
Attending: EMERGENCY MEDICINE
Payer: COMMERCIAL

## 2022-01-01 ENCOUNTER — APPOINTMENT (OUTPATIENT)
Dept: CT IMAGING | Age: 67
DRG: 466 | End: 2022-01-01
Payer: COMMERCIAL

## 2022-01-01 ENCOUNTER — OFFICE VISIT (OUTPATIENT)
Dept: PULMONOLOGY | Age: 67
End: 2022-01-01
Payer: COMMERCIAL

## 2022-01-01 ENCOUNTER — APPOINTMENT (OUTPATIENT)
Dept: GENERAL RADIOLOGY | Age: 67
DRG: 466 | End: 2022-01-01
Payer: COMMERCIAL

## 2022-01-01 ENCOUNTER — OFFICE VISIT (OUTPATIENT)
Dept: SURGERY | Age: 67
End: 2022-01-01

## 2022-01-01 ENCOUNTER — ANESTHESIA EVENT (OUTPATIENT)
Dept: OPERATING ROOM | Age: 67
DRG: 263 | End: 2022-01-01
Payer: COMMERCIAL

## 2022-01-01 ENCOUNTER — HOSPITAL ENCOUNTER (INPATIENT)
Age: 67
LOS: 2 days | DRG: 466 | End: 2022-05-20
Attending: EMERGENCY MEDICINE | Admitting: INTERNAL MEDICINE
Payer: COMMERCIAL

## 2022-01-01 ENCOUNTER — OFFICE VISIT (OUTPATIENT)
Dept: NEUROLOGY | Age: 67
End: 2022-01-01
Payer: COMMERCIAL

## 2022-01-01 ENCOUNTER — HOSPITAL ENCOUNTER (EMERGENCY)
Age: 67
Discharge: HOME | DRG: 263 | End: 2022-04-20
Payer: COMMERCIAL

## 2022-01-01 ENCOUNTER — APPOINTMENT (OUTPATIENT)
Dept: INTERVENTIONAL RADIOLOGY/VASCULAR | Age: 67
DRG: 263 | End: 2022-01-01
Payer: COMMERCIAL

## 2022-01-01 ENCOUNTER — HOSPITAL ENCOUNTER (INPATIENT)
Age: 67
LOS: 11 days | Discharge: NURSING, REHAB OR LONG-TERM CARE | DRG: 263 | End: 2022-05-04
Attending: INTERNAL MEDICINE | Admitting: INTERNAL MEDICINE
Payer: COMMERCIAL

## 2022-01-01 VITALS
HEART RATE: 62 BPM | WEIGHT: 261 LBS | SYSTOLIC BLOOD PRESSURE: 137 MMHG | RESPIRATION RATE: 18 BRPM | HEIGHT: 64 IN | OXYGEN SATURATION: 99 % | TEMPERATURE: 97.3 F | DIASTOLIC BLOOD PRESSURE: 54 MMHG | BODY MASS INDEX: 44.56 KG/M2

## 2022-01-01 VITALS
BODY MASS INDEX: 44.56 KG/M2 | RESPIRATION RATE: 16 BRPM | HEIGHT: 64 IN | WEIGHT: 261 LBS | SYSTOLIC BLOOD PRESSURE: 165 MMHG | HEART RATE: 78 BPM | DIASTOLIC BLOOD PRESSURE: 87 MMHG | TEMPERATURE: 97.7 F | OXYGEN SATURATION: 95 %

## 2022-01-01 VITALS
DIASTOLIC BLOOD PRESSURE: 69 MMHG | TEMPERATURE: 98 F | RESPIRATION RATE: 18 BRPM | OXYGEN SATURATION: 100 % | WEIGHT: 225 LBS | HEART RATE: 66 BPM | SYSTOLIC BLOOD PRESSURE: 135 MMHG | BODY MASS INDEX: 38.41 KG/M2 | HEIGHT: 64 IN

## 2022-01-01 VITALS
DIASTOLIC BLOOD PRESSURE: 69 MMHG | HEIGHT: 64 IN | WEIGHT: 224 LBS | BODY MASS INDEX: 38.24 KG/M2 | SYSTOLIC BLOOD PRESSURE: 105 MMHG | OXYGEN SATURATION: 92 % | HEART RATE: 81 BPM

## 2022-01-01 VITALS
HEART RATE: 68 BPM | TEMPERATURE: 96.7 F | WEIGHT: 261 LBS | OXYGEN SATURATION: 98 % | BODY MASS INDEX: 44.56 KG/M2 | HEIGHT: 64 IN

## 2022-01-01 VITALS
OXYGEN SATURATION: 88 % | SYSTOLIC BLOOD PRESSURE: 139 MMHG | WEIGHT: 226 LBS | BODY MASS INDEX: 38.58 KG/M2 | DIASTOLIC BLOOD PRESSURE: 88 MMHG | HEIGHT: 64 IN | HEART RATE: 66 BPM

## 2022-01-01 VITALS
HEIGHT: 64 IN | RESPIRATION RATE: 11 BRPM | WEIGHT: 261 LBS | OXYGEN SATURATION: 100 % | BODY MASS INDEX: 44.56 KG/M2 | SYSTOLIC BLOOD PRESSURE: 128 MMHG | TEMPERATURE: 97.5 F | DIASTOLIC BLOOD PRESSURE: 78 MMHG | HEART RATE: 70 BPM

## 2022-01-01 VITALS — HEART RATE: 68 BPM | SYSTOLIC BLOOD PRESSURE: 113 MMHG | DIASTOLIC BLOOD PRESSURE: 52 MMHG

## 2022-01-01 VITALS
OXYGEN SATURATION: 96 % | DIASTOLIC BLOOD PRESSURE: 53 MMHG | SYSTOLIC BLOOD PRESSURE: 112 MMHG | TEMPERATURE: 96.1 F | RESPIRATION RATE: 14 BRPM

## 2022-01-01 DIAGNOSIS — R29.898 BILATERAL LEG WEAKNESS: ICD-10-CM

## 2022-01-01 DIAGNOSIS — E11.65 TYPE 2 DIABETES MELLITUS WITH HYPERGLYCEMIA, WITH LONG-TERM CURRENT USE OF INSULIN (HCC): ICD-10-CM

## 2022-01-01 DIAGNOSIS — R10.11 ABDOMINAL PAIN, RIGHT UPPER QUADRANT: Primary | ICD-10-CM

## 2022-01-01 DIAGNOSIS — G93.40 ENCEPHALOPATHY: ICD-10-CM

## 2022-01-01 DIAGNOSIS — R10.11 RIGHT UPPER QUADRANT ABDOMINAL PAIN: Primary | ICD-10-CM

## 2022-01-01 DIAGNOSIS — R26.0 ATAXIC GAIT: ICD-10-CM

## 2022-01-01 DIAGNOSIS — E03.9 HYPOTHYROIDISM, UNSPECIFIED TYPE: ICD-10-CM

## 2022-01-01 DIAGNOSIS — Z79.4 TYPE 2 DIABETES MELLITUS WITH DIABETIC NEUROPATHY, WITH LONG-TERM CURRENT USE OF INSULIN (HCC): ICD-10-CM

## 2022-01-01 DIAGNOSIS — R11.2 NON-INTRACTABLE VOMITING WITH NAUSEA, UNSPECIFIED VOMITING TYPE: ICD-10-CM

## 2022-01-01 DIAGNOSIS — R41.82 ALTERED MENTAL STATUS, UNSPECIFIED ALTERED MENTAL STATUS TYPE: ICD-10-CM

## 2022-01-01 DIAGNOSIS — G63 POLYNEUROPATHY ASSOCIATED WITH UNDERLYING DISEASE (HCC): ICD-10-CM

## 2022-01-01 DIAGNOSIS — F20.9 SCHIZOPHRENIA, UNSPECIFIED TYPE (HCC): ICD-10-CM

## 2022-01-01 DIAGNOSIS — R60.0 BILATERAL LEG EDEMA: ICD-10-CM

## 2022-01-01 DIAGNOSIS — E11.40 TYPE 2 DIABETES MELLITUS WITH DIABETIC NEUROPATHY, WITH LONG-TERM CURRENT USE OF INSULIN (HCC): ICD-10-CM

## 2022-01-01 DIAGNOSIS — E87.1 HYPONATREMIA: ICD-10-CM

## 2022-01-01 DIAGNOSIS — Z79.4 TYPE 2 DIABETES MELLITUS WITH HYPERGLYCEMIA, WITH LONG-TERM CURRENT USE OF INSULIN (HCC): Primary | ICD-10-CM

## 2022-01-01 DIAGNOSIS — G72.9 MYOPATHY: ICD-10-CM

## 2022-01-01 DIAGNOSIS — R53.83 LETHARGY: Primary | ICD-10-CM

## 2022-01-01 DIAGNOSIS — E11.65 TYPE 2 DIABETES MELLITUS WITH HYPERGLYCEMIA, WITH LONG-TERM CURRENT USE OF INSULIN (HCC): Primary | ICD-10-CM

## 2022-01-01 DIAGNOSIS — R11.2 INTRACTABLE VOMITING WITH NAUSEA, UNSPECIFIED VOMITING TYPE: ICD-10-CM

## 2022-01-01 DIAGNOSIS — I10 PRIMARY HYPERTENSION: ICD-10-CM

## 2022-01-01 DIAGNOSIS — G47.33 OSA (OBSTRUCTIVE SLEEP APNEA): ICD-10-CM

## 2022-01-01 DIAGNOSIS — N30.91 HEMORRHAGIC CYSTITIS: ICD-10-CM

## 2022-01-01 DIAGNOSIS — N17.9 AKI (ACUTE KIDNEY INJURY) (HCC): ICD-10-CM

## 2022-01-01 DIAGNOSIS — J45.20 MILD INTERMITTENT ASTHMA WITHOUT COMPLICATION: Primary | ICD-10-CM

## 2022-01-01 DIAGNOSIS — R09.02 HYPOXIA: ICD-10-CM

## 2022-01-01 DIAGNOSIS — N30.00 ACUTE CYSTITIS WITHOUT HEMATURIA: ICD-10-CM

## 2022-01-01 DIAGNOSIS — B37.31 VAGINAL YEAST INFECTION: Primary | ICD-10-CM

## 2022-01-01 DIAGNOSIS — Z79.4 TYPE 2 DIABETES MELLITUS WITH HYPERGLYCEMIA, WITH LONG-TERM CURRENT USE OF INSULIN (HCC): ICD-10-CM

## 2022-01-01 DIAGNOSIS — I10 PRIMARY HYPERTENSION: Primary | ICD-10-CM

## 2022-01-01 DIAGNOSIS — K80.10 CALCULUS OF GALLBLADDER WITH CHRONIC CHOLECYSTITIS WITHOUT OBSTRUCTION: Primary | ICD-10-CM

## 2022-01-01 DIAGNOSIS — R53.1 WEAKNESS: ICD-10-CM

## 2022-01-01 DIAGNOSIS — E66.9 OBESITY (BMI 30-39.9): ICD-10-CM

## 2022-01-01 DIAGNOSIS — K80.20 CALCULUS OF GALLBLADDER WITHOUT CHOLECYSTITIS WITHOUT OBSTRUCTION: ICD-10-CM

## 2022-01-01 DIAGNOSIS — I67.9 CEREBROVASCULAR DISEASE: Primary | ICD-10-CM

## 2022-01-01 DIAGNOSIS — R10.9 ABDOMINAL PAIN, UNSPECIFIED ABDOMINAL LOCATION: Primary | ICD-10-CM

## 2022-01-01 DIAGNOSIS — I50.9 CONGESTIVE HEART FAILURE, UNSPECIFIED HF CHRONICITY, UNSPECIFIED HEART FAILURE TYPE (HCC): ICD-10-CM

## 2022-01-01 LAB
ACINETOBACTER CALCOAC BAUMANNII COMPLEX BY PCR: NOT DETECTED
ALBUMIN SERPL-MCNC: 2.6 G/DL (ref 3.5–4.6)
ALBUMIN SERPL-MCNC: 2.7 G/DL (ref 3.5–4.6)
ALBUMIN SERPL-MCNC: 2.8 G/DL (ref 3.5–4.6)
ALBUMIN SERPL-MCNC: 2.9 G/DL (ref 3.5–4.6)
ALBUMIN SERPL-MCNC: 3 G/DL (ref 3.5–4.6)
ALBUMIN SERPL-MCNC: 3.1 G/DL (ref 3.5–4.6)
ALBUMIN SERPL-MCNC: 3.2 G/DL (ref 3.5–4.6)
ALBUMIN SERPL-MCNC: 3.3 G/DL (ref 3.5–4.6)
ALBUMIN SERPL-MCNC: 3.4 G/DL (ref 3.5–4.6)
ALBUMIN SERPL-MCNC: 3.4 G/DL (ref 3.5–4.6)
ALBUMIN SERPL-MCNC: 3.6 G/DL (ref 3.5–4.6)
ALBUMIN SERPL-MCNC: 3.6 G/DL (ref 3.5–4.6)
ALBUMIN SERPL-MCNC: 3.7 G/DL (ref 3.5–4.6)
ALBUMIN SERPL-MCNC: 4 G/DL (ref 3.5–4.6)
ALBUMIN SERPL-MCNC: 4.1 G/DL (ref 3.5–4.6)
ALP BLD-CCNC: 101 U/L (ref 40–130)
ALP BLD-CCNC: 104 U/L (ref 40–130)
ALP BLD-CCNC: 107 U/L (ref 40–130)
ALP BLD-CCNC: 110 U/L (ref 40–130)
ALP BLD-CCNC: 112 U/L (ref 40–130)
ALP BLD-CCNC: 113 U/L (ref 40–130)
ALP BLD-CCNC: 122 U/L (ref 40–130)
ALP BLD-CCNC: 136 U/L (ref 40–130)
ALP BLD-CCNC: 145 U/L (ref 40–130)
ALP BLD-CCNC: 147 U/L (ref 40–130)
ALP BLD-CCNC: 75 U/L (ref 40–130)
ALP BLD-CCNC: 81 U/L (ref 40–130)
ALP BLD-CCNC: 84 U/L (ref 40–130)
ALP BLD-CCNC: 86 U/L (ref 40–130)
ALP BLD-CCNC: 88 U/L (ref 40–130)
ALP BLD-CCNC: 88 U/L (ref 40–130)
ALP BLD-CCNC: 90 U/L (ref 40–130)
ALP BLD-CCNC: 93 U/L (ref 40–130)
ALP BLD-CCNC: 95 U/L (ref 40–130)
ALT SERPL-CCNC: 10 U/L (ref 0–33)
ALT SERPL-CCNC: 11 U/L (ref 0–33)
ALT SERPL-CCNC: 12 U/L (ref 0–33)
ALT SERPL-CCNC: 12 U/L (ref 0–33)
ALT SERPL-CCNC: 13 U/L (ref 0–33)
ALT SERPL-CCNC: 14 U/L (ref 0–33)
ALT SERPL-CCNC: 15 U/L (ref 0–33)
ALT SERPL-CCNC: 15 U/L (ref 0–33)
ALT SERPL-CCNC: 16 U/L (ref 0–33)
ALT SERPL-CCNC: 17 U/L (ref 0–33)
ALT SERPL-CCNC: 18 U/L (ref 0–33)
ALT SERPL-CCNC: 24 U/L (ref 0–33)
ALT SERPL-CCNC: 40 U/L (ref 0–33)
ALT SERPL-CCNC: 93 U/L (ref 0–33)
ALT SERPL-CCNC: 95 U/L (ref 0–33)
ANION GAP SERPL CALCULATED.3IONS-SCNC: 10 MEQ/L (ref 9–15)
ANION GAP SERPL CALCULATED.3IONS-SCNC: 11 MEQ/L (ref 9–15)
ANION GAP SERPL CALCULATED.3IONS-SCNC: 12 MEQ/L (ref 9–15)
ANION GAP SERPL CALCULATED.3IONS-SCNC: 13 MEQ/L (ref 9–15)
ANION GAP SERPL CALCULATED.3IONS-SCNC: 13 MEQ/L (ref 9–15)
ANION GAP SERPL CALCULATED.3IONS-SCNC: 15 MEQ/L (ref 9–15)
ANION GAP SERPL CALCULATED.3IONS-SCNC: 15 MEQ/L (ref 9–15)
ANION GAP SERPL CALCULATED.3IONS-SCNC: 6 MEQ/L (ref 9–15)
ANION GAP SERPL CALCULATED.3IONS-SCNC: 7 MEQ/L (ref 9–15)
ANION GAP SERPL CALCULATED.3IONS-SCNC: 8 MEQ/L (ref 9–15)
ANION GAP SERPL CALCULATED.3IONS-SCNC: 9 MEQ/L (ref 9–15)
ANISOCYTOSIS: ABNORMAL
ANTI-XA UNFRAC HEPARIN: 0.1 IU/ML
ANTI-XA UNFRAC HEPARIN: 0.34 IU/ML
ANTI-XA UNFRAC HEPARIN: 0.52 IU/ML
ANTI-XA UNFRAC HEPARIN: 0.63 IU/ML
ANTI-XA UNFRAC HEPARIN: 0.76 IU/ML
ANTI-XA UNFRAC HEPARIN: 0.83 IU/ML
ANTI-XA UNFRAC HEPARIN: 0.92 IU/ML
APTT: 149.9 SEC (ref 24.4–36.8)
AST SERPL-CCNC: 128 U/L (ref 0–35)
AST SERPL-CCNC: 15 U/L (ref 0–35)
AST SERPL-CCNC: 151 U/L (ref 0–35)
AST SERPL-CCNC: 16 U/L (ref 0–35)
AST SERPL-CCNC: 16 U/L (ref 0–35)
AST SERPL-CCNC: 19 U/L (ref 0–35)
AST SERPL-CCNC: 20 U/L (ref 0–35)
AST SERPL-CCNC: 20 U/L (ref 0–35)
AST SERPL-CCNC: 25 U/L (ref 0–35)
AST SERPL-CCNC: 32 U/L (ref 0–35)
AST SERPL-CCNC: 32 U/L (ref 0–35)
AST SERPL-CCNC: 39 U/L (ref 0–35)
AST SERPL-CCNC: 41 U/L (ref 0–35)
AST SERPL-CCNC: 41 U/L (ref 0–35)
AST SERPL-CCNC: 52 U/L (ref 0–35)
AST SERPL-CCNC: 53 U/L (ref 0–35)
AST SERPL-CCNC: 57 U/L (ref 0–35)
AST SERPL-CCNC: 58 U/L (ref 0–35)
AST SERPL-CCNC: 64 U/L (ref 0–35)
ATYPICAL LYMPHOCYTE RELATIVE PERCENT: 1 %
BACTERIA: ABNORMAL /HPF
BACTERIA: NEGATIVE /HPF
BACTEROIDES FRAGILIS BY PCR: NOT DETECTED
BASE EXCESS ARTERIAL: -4 (ref -3–3)
BASE EXCESS ARTERIAL: -5 (ref -3–3)
BASE EXCESS ARTERIAL: -7 (ref -3–3)
BASE EXCESS ARTERIAL: -9 (ref -3–3)
BASE EXCESS ARTERIAL: -9 (ref -3–3)
BASE EXCESS ARTERIAL: 2 (ref -3–3)
BASE EXCESS ARTERIAL: 2 (ref -3–3)
BASOPHILS ABSOLUTE: 0 K/UL (ref 0–0.2)
BASOPHILS ABSOLUTE: 0.1 K/UL (ref 0–0.2)
BASOPHILS RELATIVE PERCENT: 0.2 %
BASOPHILS RELATIVE PERCENT: 0.3 %
BASOPHILS RELATIVE PERCENT: 0.4 %
BASOPHILS RELATIVE PERCENT: 0.5 %
BASOPHILS RELATIVE PERCENT: 0.6 %
BASOPHILS RELATIVE PERCENT: 0.6 %
BASOPHILS RELATIVE PERCENT: 0.7 %
BASOPHILS RELATIVE PERCENT: 1 %
BILIRUB SERPL-MCNC: 0.4 MG/DL (ref 0.2–0.7)
BILIRUB SERPL-MCNC: 0.5 MG/DL (ref 0.2–0.7)
BILIRUB SERPL-MCNC: 0.6 MG/DL (ref 0.2–0.7)
BILIRUB SERPL-MCNC: 0.7 MG/DL (ref 0.2–0.7)
BILIRUB SERPL-MCNC: 0.8 MG/DL (ref 0.2–0.7)
BILIRUB SERPL-MCNC: <0.2 MG/DL (ref 0.2–0.7)
BILIRUBIN DIRECT: <0.2 MG/DL (ref 0–0.4)
BILIRUBIN DIRECT: <0.2 MG/DL (ref 0–0.4)
BILIRUBIN URINE: ABNORMAL
BILIRUBIN URINE: NEGATIVE
BILIRUBIN, INDIRECT: NORMAL MG/DL (ref 0–0.6)
BILIRUBIN, INDIRECT: NORMAL MG/DL (ref 0–0.6)
BLOOD CULTURE, ROUTINE: ABNORMAL
BLOOD CULTURE, ROUTINE: NORMAL
BLOOD, URINE: ABNORMAL
BLOOD, URINE: ABNORMAL
BLOOD, URINE: NEGATIVE
BLOOD, URINE: NEGATIVE
BUN BLDV-MCNC: 22 MG/DL (ref 8–23)
BUN BLDV-MCNC: 25 MG/DL (ref 8–23)
BUN BLDV-MCNC: 27 MG/DL (ref 8–23)
BUN BLDV-MCNC: 27 MG/DL (ref 8–23)
BUN BLDV-MCNC: 28 MG/DL (ref 8–23)
BUN BLDV-MCNC: 29 MG/DL (ref 8–23)
BUN BLDV-MCNC: 31 MG/DL (ref 8–23)
BUN BLDV-MCNC: 34 MG/DL (ref 8–23)
BUN BLDV-MCNC: 34 MG/DL (ref 8–23)
BUN BLDV-MCNC: 35 MG/DL (ref 8–23)
BUN BLDV-MCNC: 36 MG/DL (ref 8–23)
BUN BLDV-MCNC: 37 MG/DL (ref 8–23)
BUN BLDV-MCNC: 38 MG/DL (ref 8–23)
BUN BLDV-MCNC: 39 MG/DL (ref 8–23)
BUN BLDV-MCNC: 45 MG/DL (ref 8–23)
BUN BLDV-MCNC: 45 MG/DL (ref 8–23)
BUN BLDV-MCNC: 50 MG/DL (ref 8–23)
BUN BLDV-MCNC: 50 MG/DL (ref 8–23)
BUN BLDV-MCNC: 51 MG/DL (ref 8–23)
BUN BLDV-MCNC: 53 MG/DL (ref 8–23)
BUN BLDV-MCNC: 59 MG/DL (ref 8–23)
C-REACTIVE PROTEIN, HIGH SENSITIVITY: 101.8 MG/L (ref 0–5)
C-REACTIVE PROTEIN: 46.6 MG/L (ref 0–5)
CALCIUM IONIZED: 0.89 MMOL/L (ref 1.12–1.32)
CALCIUM IONIZED: 1.11 MMOL/L (ref 1.12–1.32)
CALCIUM IONIZED: 1.16 MMOL/L (ref 1.12–1.32)
CALCIUM IONIZED: 1.16 MMOL/L (ref 1.12–1.32)
CALCIUM IONIZED: 1.2 MMOL/L (ref 1.12–1.32)
CALCIUM IONIZED: 1.22 MMOL/L (ref 1.12–1.32)
CALCIUM IONIZED: 1.23 MMOL/L (ref 1.12–1.32)
CALCIUM SERPL-MCNC: 7.6 MG/DL (ref 8.5–9.9)
CALCIUM SERPL-MCNC: 7.8 MG/DL (ref 8.5–9.9)
CALCIUM SERPL-MCNC: 8.1 MG/DL (ref 8.5–9.9)
CALCIUM SERPL-MCNC: 8.2 MG/DL (ref 8.5–9.9)
CALCIUM SERPL-MCNC: 8.3 MG/DL (ref 8.5–9.9)
CALCIUM SERPL-MCNC: 8.3 MG/DL (ref 8.5–9.9)
CALCIUM SERPL-MCNC: 8.4 MG/DL (ref 8.5–9.9)
CALCIUM SERPL-MCNC: 8.4 MG/DL (ref 8.5–9.9)
CALCIUM SERPL-MCNC: 8.5 MG/DL (ref 8.5–9.9)
CALCIUM SERPL-MCNC: 8.7 MG/DL (ref 8.5–9.9)
CALCIUM SERPL-MCNC: 8.7 MG/DL (ref 8.5–9.9)
CALCIUM SERPL-MCNC: 8.8 MG/DL (ref 8.5–9.9)
CALCIUM SERPL-MCNC: 8.8 MG/DL (ref 8.5–9.9)
CALCIUM SERPL-MCNC: 8.9 MG/DL (ref 8.5–9.9)
CALCIUM SERPL-MCNC: 8.9 MG/DL (ref 8.5–9.9)
CALCIUM SERPL-MCNC: 9 MG/DL (ref 8.5–9.9)
CALCIUM SERPL-MCNC: 9.2 MG/DL (ref 8.5–9.9)
CALCIUM SERPL-MCNC: 9.3 MG/DL (ref 8.5–9.9)
CALCIUM SERPL-MCNC: 9.6 MG/DL (ref 8.5–9.9)
CANDIDA ALBICANS BY PCR: NOT DETECTED
CANDIDA AURIS BY PCR: NOT DETECTED
CANDIDA GLABRATA BY PCR: NOT DETECTED
CANDIDA KRUSEI BY PCR: NOT DETECTED
CANDIDA PARAPSILOSIS BY PCR: NOT DETECTED
CANDIDA TROPICALIS BY PCR: NOT DETECTED
CHLORIDE BLD-SCNC: 100 MEQ/L (ref 95–107)
CHLORIDE BLD-SCNC: 101 MEQ/L (ref 95–107)
CHLORIDE BLD-SCNC: 101 MEQ/L (ref 95–107)
CHLORIDE BLD-SCNC: 102 MEQ/L (ref 95–107)
CHLORIDE BLD-SCNC: 103 MEQ/L (ref 95–107)
CHLORIDE BLD-SCNC: 104 MEQ/L (ref 95–107)
CHLORIDE BLD-SCNC: 106 MEQ/L (ref 95–107)
CHLORIDE BLD-SCNC: 90 MEQ/L (ref 95–107)
CHLORIDE BLD-SCNC: 91 MEQ/L (ref 95–107)
CHLORIDE BLD-SCNC: 92 MEQ/L (ref 95–107)
CHLORIDE BLD-SCNC: 92 MEQ/L (ref 95–107)
CHLORIDE BLD-SCNC: 93 MEQ/L (ref 95–107)
CHLORIDE BLD-SCNC: 94 MEQ/L (ref 95–107)
CHLORIDE BLD-SCNC: 94 MEQ/L (ref 95–107)
CHLORIDE BLD-SCNC: 98 MEQ/L (ref 95–107)
CHLORIDE BLD-SCNC: 99 MEQ/L (ref 95–107)
CHOLESTEROL, TOTAL: 116 MG/DL (ref 0–199)
CHOLESTEROL, TOTAL: 116 MG/DL (ref 0–199)
CHP ED QC CHECK: NORMAL
CLARITY: ABNORMAL
CLARITY: ABNORMAL
CLARITY: CLEAR
CLARITY: CLEAR
CO2: 18 MEQ/L (ref 20–31)
CO2: 18 MEQ/L (ref 20–31)
CO2: 19 MEQ/L (ref 20–31)
CO2: 20 MEQ/L (ref 20–31)
CO2: 21 MEQ/L (ref 20–31)
CO2: 22 MEQ/L (ref 20–31)
CO2: 22 MEQ/L (ref 20–31)
CO2: 23 MEQ/L (ref 20–31)
CO2: 24 MEQ/L (ref 20–31)
CO2: 25 MEQ/L (ref 20–31)
CO2: 25 MEQ/L (ref 20–31)
CO2: 27 MEQ/L (ref 20–31)
CO2: 28 MEQ/L (ref 20–31)
CO2: 29 MEQ/L (ref 20–31)
COLOR: ABNORMAL
COLOR: ABNORMAL
COLOR: YELLOW
COLOR: YELLOW
CREAT SERPL-MCNC: 1.08 MG/DL (ref 0.5–0.9)
CREAT SERPL-MCNC: 1.14 MG/DL (ref 0.5–0.9)
CREAT SERPL-MCNC: 1.19 MG/DL (ref 0.5–0.9)
CREAT SERPL-MCNC: 1.29 MG/DL (ref 0.5–0.9)
CREAT SERPL-MCNC: 1.33 MG/DL (ref 0.5–0.9)
CREAT SERPL-MCNC: 1.35 MG/DL (ref 0.5–0.9)
CREAT SERPL-MCNC: 1.36 MG/DL (ref 0.5–0.9)
CREAT SERPL-MCNC: 1.4 MG/DL (ref 0.5–0.9)
CREAT SERPL-MCNC: 1.58 MG/DL (ref 0.5–0.9)
CREAT SERPL-MCNC: 1.75 MG/DL (ref 0.5–0.9)
CREAT SERPL-MCNC: 1.77 MG/DL (ref 0.5–0.9)
CREAT SERPL-MCNC: 1.81 MG/DL (ref 0.5–0.9)
CREAT SERPL-MCNC: 1.82 MG/DL (ref 0.5–0.9)
CREAT SERPL-MCNC: 1.9 MG/DL (ref 0.5–0.9)
CREAT SERPL-MCNC: 1.91 MG/DL (ref 0.5–0.9)
CREAT SERPL-MCNC: 2.11 MG/DL (ref 0.5–0.9)
CREAT SERPL-MCNC: 2.17 MG/DL (ref 0.5–0.9)
CREAT SERPL-MCNC: 2.19 MG/DL (ref 0.5–0.9)
CREAT SERPL-MCNC: 2.45 MG/DL (ref 0.5–0.9)
CREAT SERPL-MCNC: 2.53 MG/DL (ref 0.5–0.9)
CREAT SERPL-MCNC: 2.76 MG/DL (ref 0.5–0.9)
CREAT SERPL-MCNC: 2.99 MG/DL (ref 0.5–0.9)
CREAT SERPL-MCNC: 2.99 MG/DL (ref 0.5–0.9)
CRYPTOCOCCUS NEOFORMANS/GATTII BY PCR: NOT DETECTED
CULTURE, BLOOD 2: ABNORMAL
CULTURE, BLOOD 2: NORMAL
CULTURE, BLOOD ID SENSITIVITY: ABNORMAL
CULTURE, BLOOD ID SENSITIVITY: ABNORMAL
CULTURE, RESPIRATORY: NORMAL
EKG ATRIAL RATE: 59 BPM
EKG ATRIAL RATE: 67 BPM
EKG ATRIAL RATE: 79 BPM
EKG ATRIAL RATE: 83 BPM
EKG P AXIS: 30 DEGREES
EKG P AXIS: 46 DEGREES
EKG P AXIS: 58 DEGREES
EKG P AXIS: 59 DEGREES
EKG P-R INTERVAL: 208 MS
EKG P-R INTERVAL: 214 MS
EKG P-R INTERVAL: 234 MS
EKG P-R INTERVAL: 264 MS
EKG Q-T INTERVAL: 414 MS
EKG Q-T INTERVAL: 440 MS
EKG Q-T INTERVAL: 442 MS
EKG Q-T INTERVAL: 450 MS
EKG QRS DURATION: 116 MS
EKG QRS DURATION: 124 MS
EKG QRS DURATION: 126 MS
EKG QRS DURATION: 160 MS
EKG QTC CALCULATION (BAZETT): 445 MS
EKG QTC CALCULATION (BAZETT): 467 MS
EKG QTC CALCULATION (BAZETT): 474 MS
EKG QTC CALCULATION (BAZETT): 517 MS
EKG R AXIS: -55 DEGREES
EKG R AXIS: 19 DEGREES
EKG R AXIS: 30 DEGREES
EKG R AXIS: 35 DEGREES
EKG T AXIS: 221 DEGREES
EKG T AXIS: 226 DEGREES
EKG T AXIS: 265 DEGREES
EKG T AXIS: 63 DEGREES
EKG VENTRICULAR RATE: 59 BPM
EKG VENTRICULAR RATE: 67 BPM
EKG VENTRICULAR RATE: 79 BPM
EKG VENTRICULAR RATE: 83 BPM
ENTEROBACTER CLOACAE COMPLEX BY PCR: NOT DETECTED
ENTEROBACTERALES BY PCR: NOT DETECTED
ENTEROCOCCUS FAECALIS BY PCR: NOT DETECTED
ENTEROCOCCUS FAECIUM BY PCR: NOT DETECTED
EOSINOPHILS ABSOLUTE: 0 K/UL (ref 0–0.7)
EOSINOPHILS ABSOLUTE: 0.1 K/UL (ref 0–0.7)
EOSINOPHILS ABSOLUTE: 0.2 K/UL (ref 0–0.7)
EOSINOPHILS ABSOLUTE: 0.2 K/UL (ref 0–0.7)
EOSINOPHILS ABSOLUTE: 0.3 K/UL (ref 0–0.7)
EOSINOPHILS ABSOLUTE: 0.5 K/UL (ref 0–0.7)
EOSINOPHILS RELATIVE PERCENT: 0.2 %
EOSINOPHILS RELATIVE PERCENT: 0.3 %
EOSINOPHILS RELATIVE PERCENT: 0.6 %
EOSINOPHILS RELATIVE PERCENT: 0.8 %
EOSINOPHILS RELATIVE PERCENT: 0.8 %
EOSINOPHILS RELATIVE PERCENT: 1 %
EOSINOPHILS RELATIVE PERCENT: 1 %
EOSINOPHILS RELATIVE PERCENT: 1.1 %
EOSINOPHILS RELATIVE PERCENT: 1.2 %
EOSINOPHILS RELATIVE PERCENT: 1.3 %
EOSINOPHILS RELATIVE PERCENT: 2.3 %
EOSINOPHILS RELATIVE PERCENT: 2.8 %
EOSINOPHILS RELATIVE PERCENT: 4.2 %
EPITHELIAL CELLS, UA: ABNORMAL /HPF (ref 0–5)
ESCHERICHIA COLI BY PCR: NOT DETECTED
GFR AFRICAN AMERICAN: 13
GFR AFRICAN AMERICAN: 15
GFR AFRICAN AMERICAN: 16
GFR AFRICAN AMERICAN: 18
GFR AFRICAN AMERICAN: 19.1
GFR AFRICAN AMERICAN: 19.1
GFR AFRICAN AMERICAN: 20.9
GFR AFRICAN AMERICAN: 23
GFR AFRICAN AMERICAN: 23.1
GFR AFRICAN AMERICAN: 24
GFR AFRICAN AMERICAN: 27.3
GFR AFRICAN AMERICAN: 27.6
GFR AFRICAN AMERICAN: 28.5
GFR AFRICAN AMERICAN: 32
GFR AFRICAN AMERICAN: 32
GFR AFRICAN AMERICAN: 32.1
GFR AFRICAN AMERICAN: 33.8
GFR AFRICAN AMERICAN: 34
GFR AFRICAN AMERICAN: 34.9
GFR AFRICAN AMERICAN: 35.4
GFR AFRICAN AMERICAN: 37
GFR AFRICAN AMERICAN: 39.8
GFR AFRICAN AMERICAN: 42
GFR AFRICAN AMERICAN: 45.7
GFR AFRICAN AMERICAN: 47.3
GFR AFRICAN AMERICAN: 47.7
GFR AFRICAN AMERICAN: 48.5
GFR AFRICAN AMERICAN: 50
GFR AFRICAN AMERICAN: 50.3
GFR AFRICAN AMERICAN: 55.2
GFR AFRICAN AMERICAN: 58
GFR AFRICAN AMERICAN: >60
GFR NON-AFRICAN AMERICAN: 11
GFR NON-AFRICAN AMERICAN: 13
GFR NON-AFRICAN AMERICAN: 13
GFR NON-AFRICAN AMERICAN: 15
GFR NON-AFRICAN AMERICAN: 15.7
GFR NON-AFRICAN AMERICAN: 15.7
GFR NON-AFRICAN AMERICAN: 17.3
GFR NON-AFRICAN AMERICAN: 19
GFR NON-AFRICAN AMERICAN: 19.1
GFR NON-AFRICAN AMERICAN: 19.8
GFR NON-AFRICAN AMERICAN: 22.6
GFR NON-AFRICAN AMERICAN: 22.8
GFR NON-AFRICAN AMERICAN: 23.5
GFR NON-AFRICAN AMERICAN: 26.4
GFR NON-AFRICAN AMERICAN: 26.6
GFR NON-AFRICAN AMERICAN: 27
GFR NON-AFRICAN AMERICAN: 27.9
GFR NON-AFRICAN AMERICAN: 28.1
GFR NON-AFRICAN AMERICAN: 28.8
GFR NON-AFRICAN AMERICAN: 29.2
GFR NON-AFRICAN AMERICAN: 30
GFR NON-AFRICAN AMERICAN: 32.9
GFR NON-AFRICAN AMERICAN: 35
GFR NON-AFRICAN AMERICAN: 37.8
GFR NON-AFRICAN AMERICAN: 39.1
GFR NON-AFRICAN AMERICAN: 39.4
GFR NON-AFRICAN AMERICAN: 40.1
GFR NON-AFRICAN AMERICAN: 41
GFR NON-AFRICAN AMERICAN: 41.5
GFR NON-AFRICAN AMERICAN: 45.6
GFR NON-AFRICAN AMERICAN: 47.9
GFR NON-AFRICAN AMERICAN: 51
GLOBULIN: 2.8 G/DL (ref 2.3–3.5)
GLOBULIN: 2.9 G/DL (ref 2.3–3.5)
GLOBULIN: 3.1 G/DL (ref 2.3–3.5)
GLOBULIN: 3.2 G/DL (ref 2.3–3.5)
GLOBULIN: 3.2 G/DL (ref 2.3–3.5)
GLOBULIN: 3.4 G/DL (ref 2.3–3.5)
GLOBULIN: 3.5 G/DL (ref 2.3–3.5)
GLOBULIN: 3.7 G/DL (ref 2.3–3.5)
GLOBULIN: 3.8 G/DL (ref 2.3–3.5)
GLOBULIN: 3.8 G/DL (ref 2.3–3.5)
GLOBULIN: 3.9 G/DL (ref 2.3–3.5)
GLOBULIN: 4 G/DL (ref 2.3–3.5)
GLOBULIN: 4.1 G/DL (ref 2.3–3.5)
GLOBULIN: 4.4 G/DL (ref 2.3–3.5)
GLOBULIN: 4.4 G/DL (ref 2.3–3.5)
GLUCOSE BLD-MCNC: 102 MG/DL (ref 70–99)
GLUCOSE BLD-MCNC: 105 MG/DL (ref 70–99)
GLUCOSE BLD-MCNC: 105 MG/DL (ref 70–99)
GLUCOSE BLD-MCNC: 106 MG/DL (ref 70–99)
GLUCOSE BLD-MCNC: 106 MG/DL (ref 70–99)
GLUCOSE BLD-MCNC: 107 MG/DL (ref 70–99)
GLUCOSE BLD-MCNC: 109 MG/DL (ref 70–99)
GLUCOSE BLD-MCNC: 113 MG/DL (ref 70–99)
GLUCOSE BLD-MCNC: 116 MG/DL (ref 70–99)
GLUCOSE BLD-MCNC: 117 MG/DL (ref 70–99)
GLUCOSE BLD-MCNC: 118 MG/DL (ref 70–99)
GLUCOSE BLD-MCNC: 121 MG/DL (ref 70–99)
GLUCOSE BLD-MCNC: 122 MG/DL (ref 70–99)
GLUCOSE BLD-MCNC: 123 MG/DL (ref 70–99)
GLUCOSE BLD-MCNC: 124 MG/DL (ref 70–99)
GLUCOSE BLD-MCNC: 125 MG/DL (ref 70–99)
GLUCOSE BLD-MCNC: 126 MG/DL (ref 70–99)
GLUCOSE BLD-MCNC: 129 MG/DL (ref 70–99)
GLUCOSE BLD-MCNC: 130 MG/DL (ref 70–99)
GLUCOSE BLD-MCNC: 131 MG/DL (ref 70–99)
GLUCOSE BLD-MCNC: 132 MG/DL (ref 70–99)
GLUCOSE BLD-MCNC: 133 MG/DL (ref 70–99)
GLUCOSE BLD-MCNC: 136 MG/DL (ref 70–99)
GLUCOSE BLD-MCNC: 142 MG/DL (ref 70–99)
GLUCOSE BLD-MCNC: 148 MG/DL (ref 70–99)
GLUCOSE BLD-MCNC: 148 MG/DL (ref 70–99)
GLUCOSE BLD-MCNC: 150 MG/DL (ref 70–99)
GLUCOSE BLD-MCNC: 152 MG/DL (ref 70–99)
GLUCOSE BLD-MCNC: 152 MG/DL (ref 70–99)
GLUCOSE BLD-MCNC: 153 MG/DL (ref 70–99)
GLUCOSE BLD-MCNC: 154 MG/DL (ref 70–99)
GLUCOSE BLD-MCNC: 156 MG/DL (ref 70–99)
GLUCOSE BLD-MCNC: 158 MG/DL (ref 70–99)
GLUCOSE BLD-MCNC: 159 MG/DL (ref 70–99)
GLUCOSE BLD-MCNC: 160 MG/DL (ref 70–99)
GLUCOSE BLD-MCNC: 162 MG/DL (ref 70–99)
GLUCOSE BLD-MCNC: 163 MG/DL (ref 70–99)
GLUCOSE BLD-MCNC: 167 MG/DL (ref 70–99)
GLUCOSE BLD-MCNC: 167 MG/DL (ref 70–99)
GLUCOSE BLD-MCNC: 169 MG/DL (ref 70–99)
GLUCOSE BLD-MCNC: 170 MG/DL (ref 70–99)
GLUCOSE BLD-MCNC: 171 MG/DL (ref 70–99)
GLUCOSE BLD-MCNC: 171 MG/DL (ref 70–99)
GLUCOSE BLD-MCNC: 172 MG/DL (ref 70–99)
GLUCOSE BLD-MCNC: 173 MG/DL (ref 70–99)
GLUCOSE BLD-MCNC: 174 MG/DL (ref 70–99)
GLUCOSE BLD-MCNC: 178 MG/DL (ref 70–99)
GLUCOSE BLD-MCNC: 178 MG/DL (ref 70–99)
GLUCOSE BLD-MCNC: 183 MG/DL (ref 70–99)
GLUCOSE BLD-MCNC: 186 MG/DL (ref 70–99)
GLUCOSE BLD-MCNC: 186 MG/DL (ref 70–99)
GLUCOSE BLD-MCNC: 188 MG/DL (ref 70–99)
GLUCOSE BLD-MCNC: 190 MG/DL (ref 70–99)
GLUCOSE BLD-MCNC: 190 MG/DL (ref 70–99)
GLUCOSE BLD-MCNC: 191 MG/DL (ref 70–99)
GLUCOSE BLD-MCNC: 191 MG/DL (ref 70–99)
GLUCOSE BLD-MCNC: 192 MG/DL
GLUCOSE BLD-MCNC: 196 MG/DL (ref 70–99)
GLUCOSE BLD-MCNC: 197 MG/DL (ref 70–99)
GLUCOSE BLD-MCNC: 199 MG/DL (ref 70–99)
GLUCOSE BLD-MCNC: 199 MG/DL (ref 70–99)
GLUCOSE BLD-MCNC: 200 MG/DL (ref 70–99)
GLUCOSE BLD-MCNC: 201 MG/DL (ref 70–99)
GLUCOSE BLD-MCNC: 204 MG/DL (ref 70–99)
GLUCOSE BLD-MCNC: 206 MG/DL (ref 70–99)
GLUCOSE BLD-MCNC: 214 MG/DL (ref 70–99)
GLUCOSE BLD-MCNC: 216 MG/DL (ref 70–99)
GLUCOSE BLD-MCNC: 217 MG/DL (ref 70–99)
GLUCOSE BLD-MCNC: 220 MG/DL (ref 70–99)
GLUCOSE BLD-MCNC: 220 MG/DL (ref 70–99)
GLUCOSE BLD-MCNC: 230 MG/DL (ref 70–99)
GLUCOSE BLD-MCNC: 231 MG/DL (ref 70–99)
GLUCOSE BLD-MCNC: 232 MG/DL (ref 70–99)
GLUCOSE BLD-MCNC: 259 MG/DL (ref 70–99)
GLUCOSE BLD-MCNC: 264 MG/DL (ref 70–99)
GLUCOSE BLD-MCNC: 320 MG/DL
GLUCOSE BLD-MCNC: 70 MG/DL (ref 70–99)
GLUCOSE BLD-MCNC: 72 MG/DL (ref 70–99)
GLUCOSE BLD-MCNC: 76 MG/DL (ref 70–99)
GLUCOSE BLD-MCNC: 80 MG/DL
GLUCOSE BLD-MCNC: 80 MG/DL (ref 70–99)
GLUCOSE BLD-MCNC: 82 MG/DL (ref 70–99)
GLUCOSE BLD-MCNC: 86 MG/DL (ref 70–99)
GLUCOSE BLD-MCNC: 90 MG/DL (ref 70–99)
GLUCOSE BLD-MCNC: 92 MG/DL (ref 70–99)
GLUCOSE BLD-MCNC: 92 MG/DL (ref 70–99)
GLUCOSE BLD-MCNC: 95 MG/DL (ref 70–99)
GLUCOSE BLD-MCNC: 96 MG/DL (ref 70–99)
GLUCOSE BLD-MCNC: 97 MG/DL (ref 70–99)
GLUCOSE BLD-MCNC: 99 MG/DL (ref 70–99)
GLUCOSE BLD-MCNC: <20 MG/DL (ref 70–99)
GLUCOSE URINE: NEGATIVE MG/DL
HAEMOPHILUS INFLUENZAE BY PCR: NOT DETECTED
HBA1C MFR BLD: 7.6 % (ref 4.8–5.9)
HCO3 ARTERIAL: 17.7 MMOL/L (ref 21–29)
HCO3 ARTERIAL: 19.7 MMOL/L (ref 21–29)
HCO3 ARTERIAL: 20.3 MMOL/L (ref 21–29)
HCO3 ARTERIAL: 20.7 MMOL/L (ref 21–29)
HCO3 ARTERIAL: 21.4 MMOL/L (ref 21–29)
HCO3 ARTERIAL: 28 MMOL/L (ref 21–29)
HCO3 ARTERIAL: 30.1 MMOL/L (ref 21–29)
HCT VFR BLD CALC: 23.1 % (ref 37–47)
HCT VFR BLD CALC: 23.6 % (ref 37–47)
HCT VFR BLD CALC: 24.6 % (ref 37–47)
HCT VFR BLD CALC: 24.7 % (ref 37–47)
HCT VFR BLD CALC: 24.9 % (ref 37–47)
HCT VFR BLD CALC: 25.2 % (ref 37–47)
HCT VFR BLD CALC: 25.6 % (ref 37–47)
HCT VFR BLD CALC: 25.9 % (ref 37–47)
HCT VFR BLD CALC: 25.9 % (ref 37–47)
HCT VFR BLD CALC: 26.8 % (ref 37–47)
HCT VFR BLD CALC: 27.3 % (ref 37–47)
HCT VFR BLD CALC: 27.7 % (ref 37–47)
HCT VFR BLD CALC: 34 % (ref 37–47)
HCT VFR BLD CALC: 34.7 % (ref 37–47)
HCT VFR BLD CALC: 35 % (ref 37–47)
HCT VFR BLD CALC: 35.2 % (ref 37–47)
HCT VFR BLD CALC: 36.3 % (ref 37–47)
HDLC SERPL-MCNC: 41 MG/DL (ref 40–59)
HDLC SERPL-MCNC: 45 MG/DL (ref 40–59)
HEMOGLOBIN: 10.1 GM/DL (ref 12–16)
HEMOGLOBIN: 11.3 G/DL (ref 12–16)
HEMOGLOBIN: 11.5 G/DL (ref 12–16)
HEMOGLOBIN: 11.6 G/DL (ref 12–16)
HEMOGLOBIN: 11.7 G/DL (ref 12–16)
HEMOGLOBIN: 12 G/DL (ref 12–16)
HEMOGLOBIN: 7.7 G/DL (ref 12–16)
HEMOGLOBIN: 7.8 G/DL (ref 12–16)
HEMOGLOBIN: 7.8 GM/DL (ref 12–16)
HEMOGLOBIN: 8.2 G/DL (ref 12–16)
HEMOGLOBIN: 8.3 G/DL (ref 12–16)
HEMOGLOBIN: 8.4 GM/DL (ref 12–16)
HEMOGLOBIN: 8.5 G/DL (ref 12–16)
HEMOGLOBIN: 8.8 G/DL (ref 12–16)
HEMOGLOBIN: 8.9 G/DL (ref 12–16)
HEMOGLOBIN: 9 GM/DL (ref 12–16)
HEMOGLOBIN: 9.1 G/DL (ref 12–16)
HEMOGLOBIN: 9.1 GM/DL (ref 12–16)
HEMOGLOBIN: 9.7 GM/DL (ref 12–16)
HEMOGLOBIN: 9.8 GM/DL (ref 12–16)
HYALINE CASTS: ABNORMAL /LPF (ref 0–5)
HYPOCHROMIA: ABNORMAL
INR BLD: 1.3
INR BLD: 1.4
KETONES, URINE: ABNORMAL MG/DL
KETONES, URINE: NEGATIVE MG/DL
KLEBSIELLA AEROGENES BY PCR: NOT DETECTED
KLEBSIELLA OXYTOCA BY PCR: NOT DETECTED
KLEBSIELLA PNEUMONIAE GROUP BY PCR: NOT DETECTED
LACTATE: 0.8 MMOL/L (ref 0.4–2)
LACTATE: 0.82 MMOL/L (ref 0.4–2)
LACTATE: 0.88 MMOL/L (ref 0.4–2)
LACTATE: 1.14 MMOL/L (ref 0.4–2)
LACTATE: 1.18 MMOL/L (ref 0.4–2)
LACTATE: 1.4 MMOL/L (ref 0.4–2)
LACTATE: 9.26 MMOL/L (ref 0.4–2)
LACTIC ACID: 1 MMOL/L (ref 0.5–2.2)
LACTIC ACID: 1.2 MMOL/L (ref 0.5–2.2)
LACTIC ACID: 1.3 MMOL/L (ref 0.5–2.2)
LACTIC ACID: 1.4 MMOL/L (ref 0.5–2.2)
LACTIC ACID: 1.4 MMOL/L (ref 0.5–2.2)
LDL CHOLESTEROL CALCULATED: 51 MG/DL (ref 0–129)
LDL CHOLESTEROL CALCULATED: 55 MG/DL (ref 0–129)
LEUKOCYTE ESTERASE, URINE: ABNORMAL
LEUKOCYTE ESTERASE, URINE: NEGATIVE
LIPASE: 16 U/L (ref 12–95)
LIPASE: 25 U/L (ref 12–95)
LIPASE: 44 U/L (ref 12–95)
LISTERIA MONOCYTOGENES BY PCR: NOT DETECTED
LV EF: 48 %
LVEF MODALITY: NORMAL
LYMPHOCYTES ABSOLUTE: 0.8 K/UL (ref 1–4.8)
LYMPHOCYTES ABSOLUTE: 0.9 K/UL (ref 1–4.8)
LYMPHOCYTES ABSOLUTE: 1.1 K/UL (ref 1–4.8)
LYMPHOCYTES ABSOLUTE: 1.1 K/UL (ref 1–4.8)
LYMPHOCYTES ABSOLUTE: 1.2 K/UL (ref 1–4.8)
LYMPHOCYTES ABSOLUTE: 1.2 K/UL (ref 1–4.8)
LYMPHOCYTES ABSOLUTE: 1.3 K/UL (ref 1–4.8)
LYMPHOCYTES ABSOLUTE: 1.4 K/UL (ref 1–4.8)
LYMPHOCYTES ABSOLUTE: 1.6 K/UL (ref 1–4.8)
LYMPHOCYTES RELATIVE PERCENT: 10.8 %
LYMPHOCYTES RELATIVE PERCENT: 11.5 %
LYMPHOCYTES RELATIVE PERCENT: 11.9 %
LYMPHOCYTES RELATIVE PERCENT: 12 %
LYMPHOCYTES RELATIVE PERCENT: 12.7 %
LYMPHOCYTES RELATIVE PERCENT: 13.8 %
LYMPHOCYTES RELATIVE PERCENT: 14 %
LYMPHOCYTES RELATIVE PERCENT: 14.7 %
LYMPHOCYTES RELATIVE PERCENT: 17.8 %
LYMPHOCYTES RELATIVE PERCENT: 5.1 %
LYMPHOCYTES RELATIVE PERCENT: 7.5 %
LYMPHOCYTES RELATIVE PERCENT: 9 %
LYMPHOCYTES RELATIVE PERCENT: 9.1 %
LYMPHOCYTES RELATIVE PERCENT: 9.4 %
LYMPHOCYTES RELATIVE PERCENT: 9.9 %
MACROCYTES: ABNORMAL
MAGNESIUM: 1.6 MG/DL (ref 1.7–2.4)
MAGNESIUM: 1.9 MG/DL (ref 1.7–2.4)
MAGNESIUM: 2 MG/DL (ref 1.7–2.4)
MAGNESIUM: 2.1 MG/DL (ref 1.7–2.4)
MAGNESIUM: 2.2 MG/DL (ref 1.7–2.4)
MCH RBC QN AUTO: 23.4 PG (ref 27–31.3)
MCH RBC QN AUTO: 24 PG (ref 27–31.3)
MCH RBC QN AUTO: 24.4 PG (ref 27–31.3)
MCH RBC QN AUTO: 24.4 PG (ref 27–31.3)
MCH RBC QN AUTO: 24.5 PG (ref 27–31.3)
MCH RBC QN AUTO: 24.5 PG (ref 27–31.3)
MCH RBC QN AUTO: 24.6 PG (ref 27–31.3)
MCH RBC QN AUTO: 24.6 PG (ref 27–31.3)
MCH RBC QN AUTO: 24.7 PG (ref 27–31.3)
MCH RBC QN AUTO: 24.8 PG (ref 27–31.3)
MCH RBC QN AUTO: 24.9 PG (ref 27–31.3)
MCH RBC QN AUTO: 24.9 PG (ref 27–31.3)
MCH RBC QN AUTO: 25 PG (ref 27–31.3)
MCH RBC QN AUTO: 25 PG (ref 27–31.3)
MCH RBC QN AUTO: 25.2 PG (ref 27–31.3)
MCHC RBC AUTO-ENTMCNC: 31.6 % (ref 33–37)
MCHC RBC AUTO-ENTMCNC: 32.4 % (ref 33–37)
MCHC RBC AUTO-ENTMCNC: 32.8 % (ref 33–37)
MCHC RBC AUTO-ENTMCNC: 32.8 % (ref 33–37)
MCHC RBC AUTO-ENTMCNC: 33 % (ref 33–37)
MCHC RBC AUTO-ENTMCNC: 33.1 % (ref 33–37)
MCHC RBC AUTO-ENTMCNC: 33.2 % (ref 33–37)
MCHC RBC AUTO-ENTMCNC: 33.4 % (ref 33–37)
MCHC RBC AUTO-ENTMCNC: 33.4 % (ref 33–37)
MCHC RBC AUTO-ENTMCNC: 33.5 % (ref 33–37)
MCHC RBC AUTO-ENTMCNC: 33.9 % (ref 33–37)
MCV RBC AUTO: 73.4 FL (ref 82–100)
MCV RBC AUTO: 73.7 FL (ref 82–100)
MCV RBC AUTO: 73.8 FL (ref 82–100)
MCV RBC AUTO: 73.9 FL (ref 82–100)
MCV RBC AUTO: 73.9 FL (ref 82–100)
MCV RBC AUTO: 74.1 FL (ref 82–100)
MCV RBC AUTO: 74.2 FL (ref 82–100)
MCV RBC AUTO: 74.4 FL (ref 82–100)
MCV RBC AUTO: 74.7 FL (ref 82–100)
MCV RBC AUTO: 74.8 FL (ref 82–100)
MCV RBC AUTO: 74.9 FL (ref 82–100)
MCV RBC AUTO: 75 FL (ref 82–100)
MCV RBC AUTO: 75.1 FL (ref 82–100)
MCV RBC AUTO: 75.1 FL (ref 82–100)
MCV RBC AUTO: 75.2 FL (ref 82–100)
METHICILLIN RESISTANCE MECA/C  BY PCR: DETECTED
MICROCYTES: ABNORMAL
MICROCYTES: ABNORMAL
MONOCYTES ABSOLUTE: 0.3 K/UL (ref 0.2–0.8)
MONOCYTES ABSOLUTE: 0.4 K/UL (ref 0.2–0.8)
MONOCYTES ABSOLUTE: 0.4 K/UL (ref 0.2–0.8)
MONOCYTES ABSOLUTE: 0.7 K/UL (ref 0.2–0.8)
MONOCYTES ABSOLUTE: 0.8 K/UL (ref 0.2–0.8)
MONOCYTES ABSOLUTE: 0.9 K/UL (ref 0.2–0.8)
MONOCYTES ABSOLUTE: 0.9 K/UL (ref 0.2–0.8)
MONOCYTES ABSOLUTE: 1 K/UL (ref 0.2–0.8)
MONOCYTES ABSOLUTE: 1 K/UL (ref 0.2–0.8)
MONOCYTES ABSOLUTE: 1.1 K/UL (ref 0.2–0.8)
MONOCYTES ABSOLUTE: 1.1 K/UL (ref 0.2–0.8)
MONOCYTES ABSOLUTE: 1.2 K/UL (ref 0.2–0.8)
MONOCYTES ABSOLUTE: 1.3 K/UL (ref 0.2–0.8)
MONOCYTES ABSOLUTE: 1.3 K/UL (ref 0.2–0.8)
MONOCYTES ABSOLUTE: 1.5 K/UL (ref 0.2–0.8)
MONOCYTES RELATIVE PERCENT: 10 %
MONOCYTES RELATIVE PERCENT: 10.2 %
MONOCYTES RELATIVE PERCENT: 10.6 %
MONOCYTES RELATIVE PERCENT: 10.9 %
MONOCYTES RELATIVE PERCENT: 11.6 %
MONOCYTES RELATIVE PERCENT: 13.9 %
MONOCYTES RELATIVE PERCENT: 2.9 %
MONOCYTES RELATIVE PERCENT: 4.8 %
MONOCYTES RELATIVE PERCENT: 4.9 %
MONOCYTES RELATIVE PERCENT: 6 %
MONOCYTES RELATIVE PERCENT: 7 %
MONOCYTES RELATIVE PERCENT: 7.4 %
MONOCYTES RELATIVE PERCENT: 8.5 %
MONOCYTES RELATIVE PERCENT: 9 %
MONOCYTES RELATIVE PERCENT: 9.1 %
NEISSERIA MENINGITIDIS BY PCR: NOT DETECTED
NEUTROPHILS ABSOLUTE: 12.3 K/UL (ref 1.4–6.5)
NEUTROPHILS ABSOLUTE: 13.3 K/UL (ref 1.4–6.5)
NEUTROPHILS ABSOLUTE: 6.3 K/UL (ref 1.4–6.5)
NEUTROPHILS ABSOLUTE: 6.8 K/UL (ref 1.4–6.5)
NEUTROPHILS ABSOLUTE: 7.4 K/UL (ref 1.4–6.5)
NEUTROPHILS ABSOLUTE: 7.4 K/UL (ref 1.4–6.5)
NEUTROPHILS ABSOLUTE: 7.5 K/UL (ref 1.4–6.5)
NEUTROPHILS ABSOLUTE: 7.6 K/UL (ref 1.4–6.5)
NEUTROPHILS ABSOLUTE: 8.4 K/UL (ref 1.4–6.5)
NEUTROPHILS ABSOLUTE: 8.7 K/UL (ref 1.4–6.5)
NEUTROPHILS ABSOLUTE: 8.9 K/UL (ref 1.4–6.5)
NEUTROPHILS ABSOLUTE: 8.9 K/UL (ref 1.4–6.5)
NEUTROPHILS ABSOLUTE: 9 K/UL (ref 1.4–6.5)
NEUTROPHILS RELATIVE PERCENT: 68.9 %
NEUTROPHILS RELATIVE PERCENT: 70.9 %
NEUTROPHILS RELATIVE PERCENT: 73.5 %
NEUTROPHILS RELATIVE PERCENT: 74.5 %
NEUTROPHILS RELATIVE PERCENT: 75.5 %
NEUTROPHILS RELATIVE PERCENT: 76.8 %
NEUTROPHILS RELATIVE PERCENT: 78 %
NEUTROPHILS RELATIVE PERCENT: 79 %
NEUTROPHILS RELATIVE PERCENT: 79.4 %
NEUTROPHILS RELATIVE PERCENT: 80 %
NEUTROPHILS RELATIVE PERCENT: 81.3 %
NEUTROPHILS RELATIVE PERCENT: 83 %
NEUTROPHILS RELATIVE PERCENT: 84.7 %
NEUTROPHILS RELATIVE PERCENT: 88 %
NEUTROPHILS RELATIVE PERCENT: 88.3 %
NITRITE, URINE: NEGATIVE
NITRITE, URINE: NEGATIVE
NITRITE, URINE: POSITIVE
NITRITE, URINE: POSITIVE
NUCLEATED RED BLOOD CELLS: 2 /100 WBC
NUCLEATED RED BLOOD CELLS: 4 /100 WBC
O2 SAT, ARTERIAL: 84 % (ref 93–100)
O2 SAT, ARTERIAL: 85 % (ref 93–100)
O2 SAT, ARTERIAL: 85 % (ref 93–100)
O2 SAT, ARTERIAL: 98 % (ref 93–100)
O2 SAT, ARTERIAL: 98 % (ref 93–100)
O2 SAT, ARTERIAL: 99 % (ref 93–100)
O2 SAT, ARTERIAL: 99 % (ref 93–100)
ORGANISM: ABNORMAL
OVALOCYTES: ABNORMAL
OVALOCYTES: ABNORMAL
PCO2 ARTERIAL: 29 MM HG (ref 35–45)
PCO2 ARTERIAL: 36 MM HG (ref 35–45)
PCO2 ARTERIAL: 41 MM HG (ref 35–45)
PCO2 ARTERIAL: 52 MM HG (ref 35–45)
PCO2 ARTERIAL: 52 MM HG (ref 35–45)
PCO2 ARTERIAL: 56 MM HG (ref 35–45)
PCO2 ARTERIAL: 83 MM HG (ref 35–45)
PDW BLD-RTO: 18.2 % (ref 11.5–14.5)
PDW BLD-RTO: 18.8 % (ref 11.5–14.5)
PDW BLD-RTO: 19 % (ref 11.5–14.5)
PDW BLD-RTO: 19.1 % (ref 11.5–14.5)
PDW BLD-RTO: 19.2 % (ref 11.5–14.5)
PDW BLD-RTO: 19.2 % (ref 11.5–14.5)
PDW BLD-RTO: 19.3 % (ref 11.5–14.5)
PDW BLD-RTO: 19.3 % (ref 11.5–14.5)
PDW BLD-RTO: 19.4 % (ref 11.5–14.5)
PDW BLD-RTO: 19.5 % (ref 11.5–14.5)
PDW BLD-RTO: 19.5 % (ref 11.5–14.5)
PDW BLD-RTO: 19.6 % (ref 11.5–14.5)
PDW BLD-RTO: 19.8 % (ref 11.5–14.5)
PDW BLD-RTO: 19.9 % (ref 11.5–14.5)
PDW BLD-RTO: 19.9 % (ref 11.5–14.5)
PERFORMED ON: ABNORMAL
PERFORMED ON: NORMAL
PH ARTERIAL: 7.17 (ref 7.35–7.45)
PH ARTERIAL: 7.19 (ref 7.35–7.45)
PH ARTERIAL: 7.21 (ref 7.35–7.45)
PH ARTERIAL: 7.3 (ref 7.35–7.45)
PH ARTERIAL: 7.31 (ref 7.35–7.45)
PH ARTERIAL: 7.32 (ref 7.35–7.45)
PH ARTERIAL: 7.45 (ref 7.35–7.45)
PH UA: 6 (ref 5–9)
PH UA: 6.5 (ref 5–9)
PHOSPHORUS: 2.2 MG/DL (ref 2.3–4.8)
PHOSPHORUS: 2.5 MG/DL (ref 2.3–4.8)
PHOSPHORUS: 2.6 MG/DL (ref 2.3–4.8)
PHOSPHORUS: 2.9 MG/DL (ref 2.3–4.8)
PHOSPHORUS: 3.3 MG/DL (ref 2.3–4.8)
PHOSPHORUS: 3.4 MG/DL (ref 2.3–4.8)
PHOSPHORUS: 3.5 MG/DL (ref 2.3–4.8)
PLATELET # BLD: 135 K/UL (ref 130–400)
PLATELET # BLD: 136 K/UL (ref 130–400)
PLATELET # BLD: 154 K/UL (ref 130–400)
PLATELET # BLD: 160 K/UL (ref 130–400)
PLATELET # BLD: 174 K/UL (ref 130–400)
PLATELET # BLD: 175 K/UL (ref 130–400)
PLATELET # BLD: 176 K/UL (ref 130–400)
PLATELET # BLD: 179 K/UL (ref 130–400)
PLATELET # BLD: 204 K/UL (ref 130–400)
PLATELET # BLD: 205 K/UL (ref 130–400)
PLATELET # BLD: 208 K/UL (ref 130–400)
PLATELET # BLD: 228 K/UL (ref 130–400)
PLATELET # BLD: 237 K/UL (ref 130–400)
PLATELET # BLD: 247 K/UL (ref 130–400)
PLATELET # BLD: 256 K/UL (ref 130–400)
PLATELET # BLD: 279 K/UL (ref 130–400)
PLATELET # BLD: 321 K/UL (ref 130–400)
PLATELET SLIDE REVIEW: NORMAL
PO2 ARTERIAL: 112 MM HG (ref 75–108)
PO2 ARTERIAL: 117 MM HG (ref 75–108)
PO2 ARTERIAL: 118 MM HG (ref 75–108)
PO2 ARTERIAL: 180 MM HG (ref 75–108)
PO2 ARTERIAL: 61 MM HG (ref 75–108)
PO2 ARTERIAL: 63 MM HG (ref 75–108)
PO2 ARTERIAL: 65 MM HG (ref 75–108)
POC CHLORIDE: 104 MEQ/L (ref 99–110)
POC CHLORIDE: 105 MEQ/L (ref 99–110)
POC CHLORIDE: 107 MEQ/L (ref 99–110)
POC CHLORIDE: 107 MEQ/L (ref 99–110)
POC CHLORIDE: 111 MEQ/L (ref 99–110)
POC CHLORIDE: 94 MEQ/L (ref 99–110)
POC CHLORIDE: 99 MEQ/L (ref 99–110)
POC CREATININE WHOLE BLOOD: 1.5
POC CREATININE: 1.3 MG/DL (ref 0.6–1.2)
POC CREATININE: 1.5 MG/DL (ref 0.6–1.2)
POC CREATININE: 1.7 MG/DL (ref 0.6–1.2)
POC CREATININE: 1.9 MG/DL (ref 0.6–1.2)
POC CREATININE: 2.5 MG/DL (ref 0.6–1.2)
POC CREATININE: 3.1 MG/DL (ref 0.6–1.2)
POC CREATININE: 3.5 MG/DL (ref 0.6–1.2)
POC CREATININE: 3.6 MG/DL (ref 0.6–1.2)
POC CREATININE: 4.1 MG/DL (ref 0.6–1.2)
POC FIO2: 100
POC FIO2: 2
POC FIO2: 5
POC FIO2: 50
POC FIO2: 50
POC FIO2: 60
POC FIO2: 60
POC HEMATOCRIT: 23 % (ref 36–48)
POC HEMATOCRIT: 25 % (ref 36–48)
POC HEMATOCRIT: 27 % (ref 36–48)
POC HEMATOCRIT: 27 % (ref 36–48)
POC HEMATOCRIT: 29 % (ref 36–48)
POC HEMATOCRIT: 29 % (ref 36–48)
POC HEMATOCRIT: 30 % (ref 36–48)
POC POTASSIUM: 3.5 MEQ/L (ref 3.5–5.1)
POC POTASSIUM: 4.8 MEQ/L (ref 3.5–5.1)
POC POTASSIUM: 5.2 MEQ/L (ref 3.5–5.1)
POC POTASSIUM: 5.9 MEQ/L (ref 3.5–5.1)
POC POTASSIUM: 7.2 MEQ/L (ref 3.5–5.1)
POC SAMPLE TYPE: ABNORMAL
POC SAMPLE TYPE: NORMAL
POC SODIUM: 132 MEQ/L (ref 136–145)
POC SODIUM: 133 MEQ/L (ref 136–145)
POC SODIUM: 134 MEQ/L (ref 136–145)
POC SODIUM: 135 MEQ/L (ref 136–145)
POC SODIUM: 136 MEQ/L (ref 136–145)
POC SODIUM: 141 MEQ/L (ref 136–145)
POC SODIUM: 141 MEQ/L (ref 136–145)
POIKILOCYTES: ABNORMAL
POLYCHROMASIA: ABNORMAL
POTASSIUM REFLEX MAGNESIUM: 3.9 MEQ/L (ref 3.4–4.9)
POTASSIUM REFLEX MAGNESIUM: 3.9 MEQ/L (ref 3.4–4.9)
POTASSIUM REFLEX MAGNESIUM: 4.2 MEQ/L (ref 3.4–4.9)
POTASSIUM REFLEX MAGNESIUM: 4.5 MEQ/L (ref 3.4–4.9)
POTASSIUM REFLEX MAGNESIUM: 4.6 MEQ/L (ref 3.4–4.9)
POTASSIUM REFLEX MAGNESIUM: 4.6 MEQ/L (ref 3.4–4.9)
POTASSIUM REFLEX MAGNESIUM: 4.8 MEQ/L (ref 3.4–4.9)
POTASSIUM REFLEX MAGNESIUM: 4.8 MEQ/L (ref 3.4–4.9)
POTASSIUM REFLEX MAGNESIUM: 5 MEQ/L (ref 3.4–4.9)
POTASSIUM REFLEX MAGNESIUM: 5.4 MEQ/L (ref 3.4–4.9)
POTASSIUM REFLEX MAGNESIUM: 5.6 MEQ/L (ref 3.4–4.9)
POTASSIUM REFLEX MAGNESIUM: 6.2 MEQ/L (ref 3.4–4.9)
POTASSIUM SERPL-SCNC: 3.5 MEQ/L (ref 3.4–4.9)
POTASSIUM SERPL-SCNC: 3.9 MEQ/L (ref 3.4–4.9)
POTASSIUM SERPL-SCNC: 3.9 MEQ/L (ref 3.4–4.9)
POTASSIUM SERPL-SCNC: 4 MEQ/L (ref 3.4–4.9)
POTASSIUM SERPL-SCNC: 4.1 MEQ/L (ref 3.4–4.9)
POTASSIUM SERPL-SCNC: 4.5 MEQ/L (ref 3.4–4.9)
POTASSIUM SERPL-SCNC: 4.6 MEQ/L (ref 3.4–4.9)
POTASSIUM SERPL-SCNC: 4.8 MEQ/L (ref 3.4–4.9)
POTASSIUM SERPL-SCNC: 5.1 MEQ/L (ref 3.4–4.9)
POTASSIUM SERPL-SCNC: 6 MEQ/L (ref 3.4–4.9)
POTASSIUM SERPL-SCNC: 6.1 MEQ/L (ref 3.4–4.9)
POTASSIUM SERPL-SCNC: 6.1 MEQ/L (ref 3.4–4.9)
POTASSIUM SERPL-SCNC: 6.3 MEQ/L (ref 3.4–4.9)
POTASSIUM SERPL-SCNC: 6.4 MEQ/L (ref 3.4–4.9)
PRO-BNP: NORMAL PG/ML
PROCALCITONIN: 0.33 NG/ML (ref 0–0.15)
PROCALCITONIN: 1.82 NG/ML (ref 0–0.15)
PROTEIN UA: 100 MG/DL
PROTEIN UA: 30 MG/DL
PROTEIN UA: ABNORMAL MG/DL
PROTEIN UA: NEGATIVE MG/DL
PROTEUS SPECIES BY PCR: NOT DETECTED
PROTHROMBIN TIME: 16.4 SEC (ref 12.3–14.9)
PROTHROMBIN TIME: 17.3 SEC (ref 12.3–14.9)
PSEUDOMONAS AERUGINOSA BY PCR: NOT DETECTED
RBC # BLD: 3.09 M/UL (ref 4.2–5.4)
RBC # BLD: 3.16 M/UL (ref 4.2–5.4)
RBC # BLD: 3.27 M/UL (ref 4.2–5.4)
RBC # BLD: 3.29 M/UL (ref 4.2–5.4)
RBC # BLD: 3.34 M/UL (ref 4.2–5.4)
RBC # BLD: 3.39 M/UL (ref 4.2–5.4)
RBC # BLD: 3.45 M/UL (ref 4.2–5.4)
RBC # BLD: 3.46 M/UL (ref 4.2–5.4)
RBC # BLD: 3.47 M/UL (ref 4.2–5.4)
RBC # BLD: 3.64 M/UL (ref 4.2–5.4)
RBC # BLD: 3.68 M/UL (ref 4.2–5.4)
RBC # BLD: 3.75 M/UL (ref 4.2–5.4)
RBC # BLD: 4.54 M/UL (ref 4.2–5.4)
RBC # BLD: 4.69 M/UL (ref 4.2–5.4)
RBC # BLD: 4.73 M/UL (ref 4.2–5.4)
RBC # BLD: 4.77 M/UL (ref 4.2–5.4)
RBC # BLD: 4.85 M/UL (ref 4.2–5.4)
RBC UA: >100 /HPF (ref 0–5)
RBC UA: >100 /HPF (ref 0–5)
REASON FOR REJECTION: NORMAL
REASON FOR REJECTION: NORMAL
REJECTED TEST: NORMAL
REJECTED TEST: NORMAL
SALMONELLA SPECIES BY PCR: NOT DETECTED
SCHISTOCYTES: ABNORMAL
SEDIMENTATION RATE, ERYTHROCYTE: 51 MM (ref 0–30)
SERRATIA MARCESCENS BY PCR: NOT DETECTED
SODIUM BLD-SCNC: 125 MEQ/L (ref 135–144)
SODIUM BLD-SCNC: 126 MEQ/L (ref 135–144)
SODIUM BLD-SCNC: 126 MEQ/L (ref 135–144)
SODIUM BLD-SCNC: 127 MEQ/L (ref 135–144)
SODIUM BLD-SCNC: 128 MEQ/L (ref 135–144)
SODIUM BLD-SCNC: 129 MEQ/L (ref 135–144)
SODIUM BLD-SCNC: 130 MEQ/L (ref 135–144)
SODIUM BLD-SCNC: 132 MEQ/L (ref 135–144)
SODIUM BLD-SCNC: 133 MEQ/L (ref 135–144)
SODIUM BLD-SCNC: 134 MEQ/L (ref 135–144)
SODIUM BLD-SCNC: 134 MEQ/L (ref 135–144)
SODIUM BLD-SCNC: 135 MEQ/L (ref 135–144)
SODIUM BLD-SCNC: 135 MEQ/L (ref 135–144)
SODIUM BLD-SCNC: 136 MEQ/L (ref 135–144)
SODIUM BLD-SCNC: 137 MEQ/L (ref 135–144)
SODIUM BLD-SCNC: 138 MEQ/L (ref 135–144)
SODIUM BLD-SCNC: 138 MEQ/L (ref 135–144)
SODIUM BLD-SCNC: 139 MEQ/L (ref 135–144)
SPECIFIC GRAVITY UA: 1.01 (ref 1–1.03)
SPECIFIC GRAVITY UA: 1.02 (ref 1–1.03)
SPECIFIC GRAVITY UA: 1.02 (ref 1–1.03)
SPECIFIC GRAVITY UA: 1.04 (ref 1–1.03)
STAPHYLOCOCCUS AUREUS BY PCR: NOT DETECTED
STAPHYLOCOCCUS EPIDERMIDIS BY PCR: DETECTED
STAPHYLOCOCCUS LUGDUNENSIS BY PCR: NOT DETECTED
STAPHYLOCOCCUS SPECIES BY PCR: DETECTED
STENOTROPHOMONAS MALTOPHILIA BY PCR: NOT DETECTED
STREPTOCOCCUS AGALACTIAE BY PCR: NOT DETECTED
STREPTOCOCCUS PNEUMONIAE BY PCR: NOT DETECTED
STREPTOCOCCUS PYOGENES  BY PCR: NOT DETECTED
STREPTOCOCCUS SPECIES BY PCR: NOT DETECTED
T4 FREE: 1.72 NG/DL (ref 0.84–1.68)
TARGET CELLS: ABNORMAL
TCO2 ARTERIAL: 19 MMOL/L (ref 21–32)
TCO2 ARTERIAL: 21 MMOL/L (ref 21–32)
TCO2 ARTERIAL: 21 MMOL/L (ref 21–32)
TCO2 ARTERIAL: 22 MMOL/L (ref 21–32)
TCO2 ARTERIAL: 23 MMOL/L (ref 21–32)
TCO2 ARTERIAL: 30 MMOL/L (ref 21–32)
TCO2 ARTERIAL: 33 MMOL/L (ref 21–32)
TEAR DROP CELLS: ABNORMAL
TOTAL CK: 1354 U/L (ref 0–170)
TOTAL CK: 63 U/L (ref 0–170)
TOTAL PROTEIN: 5.5 G/DL (ref 6.3–8)
TOTAL PROTEIN: 6 G/DL (ref 6.3–8)
TOTAL PROTEIN: 6 G/DL (ref 6.3–8)
TOTAL PROTEIN: 6.1 G/DL (ref 6.3–8)
TOTAL PROTEIN: 6.4 G/DL (ref 6.3–8)
TOTAL PROTEIN: 6.4 G/DL (ref 6.3–8)
TOTAL PROTEIN: 6.5 G/DL (ref 6.3–8)
TOTAL PROTEIN: 6.8 G/DL (ref 6.3–8)
TOTAL PROTEIN: 7 G/DL (ref 6.3–8)
TOTAL PROTEIN: 7 G/DL (ref 6.3–8)
TOTAL PROTEIN: 7.3 G/DL (ref 6.3–8)
TOTAL PROTEIN: 7.4 G/DL (ref 6.3–8)
TOTAL PROTEIN: 7.4 G/DL (ref 6.3–8)
TOTAL PROTEIN: 7.5 G/DL (ref 6.3–8)
TOTAL PROTEIN: 7.9 G/DL (ref 6.3–8)
TOTAL PROTEIN: 8.4 G/DL (ref 6.3–8)
TOTAL PROTEIN: 8.5 G/DL (ref 6.3–8)
TRIGL SERPL-MCNC: 100 MG/DL (ref 0–150)
TRIGL SERPL-MCNC: 102 MG/DL (ref 0–150)
TROPONIN: 0.08 NG/ML (ref 0–0.01)
TROPONIN: 0.22 NG/ML (ref 0–0.01)
TROPONIN: 0.33 NG/ML (ref 0–0.01)
TROPONIN: 0.37 NG/ML (ref 0–0.01)
TROPONIN: 0.62 NG/ML (ref 0–0.01)
TROPONIN: 0.89 NG/ML (ref 0–0.01)
TROPONIN: <0.01 NG/ML (ref 0–0.01)
TSH REFLEX: 2.51 UIU/ML (ref 0.44–3.86)
URINE CULTURE, ROUTINE: NORMAL
URINE CULTURE, ROUTINE: NORMAL
URINE REFLEX TO CULTURE: ABNORMAL
URINE REFLEX TO CULTURE: NORMAL
URINE REFLEX TO CULTURE: YES
UROBILINOGEN, URINE: 0.2 E.U./DL
UROBILINOGEN, URINE: 0.2 E.U./DL
UROBILINOGEN, URINE: 1 E.U./DL
UROBILINOGEN, URINE: 1 E.U./DL
VANCOMYCIN RANDOM: 10.2 UG/ML (ref 10–40)
VANCOMYCIN RANDOM: 22.1 UG/ML (ref 10–40)
VANCOMYCIN RANDOM: 5.5 UG/ML (ref 10–40)
WBC # BLD: 10.4 K/UL (ref 4.8–10.8)
WBC # BLD: 10.6 K/UL (ref 4.8–10.8)
WBC # BLD: 10.7 K/UL (ref 4.8–10.8)
WBC # BLD: 10.8 K/UL (ref 4.8–10.8)
WBC # BLD: 11.2 K/UL (ref 4.8–10.8)
WBC # BLD: 11.7 K/UL (ref 4.8–10.8)
WBC # BLD: 11.8 K/UL (ref 4.8–10.8)
WBC # BLD: 15 K/UL (ref 4.8–10.8)
WBC # BLD: 15.1 K/UL (ref 4.8–10.8)
WBC # BLD: 8.5 K/UL (ref 4.8–10.8)
WBC # BLD: 8.5 K/UL (ref 4.8–10.8)
WBC # BLD: 8.7 K/UL (ref 4.8–10.8)
WBC # BLD: 8.7 K/UL (ref 4.8–10.8)
WBC # BLD: 8.9 K/UL (ref 4.8–10.8)
WBC # BLD: 9 K/UL (ref 4.8–10.8)
WBC # BLD: 9.5 K/UL (ref 4.8–10.8)
WBC # BLD: 9.8 K/UL (ref 4.8–10.8)
WBC UA: >100 /HPF (ref 0–5)
WBC UA: >100 /HPF (ref 0–5)
WBC UA: ABNORMAL /HPF (ref 0–5)
YEAST: PRESENT /HPF

## 2022-01-01 PROCEDURE — 84295 ASSAY OF SERUM SODIUM: CPT

## 2022-01-01 PROCEDURE — 6370000000 HC RX 637 (ALT 250 FOR IP): Performed by: INTERNAL MEDICINE

## 2022-01-01 PROCEDURE — 2500000003 HC RX 250 WO HCPCS: Performed by: NURSE PRACTITIONER

## 2022-01-01 PROCEDURE — 80053 COMPREHEN METABOLIC PANEL: CPT

## 2022-01-01 PROCEDURE — 85730 THROMBOPLASTIN TIME PARTIAL: CPT

## 2022-01-01 PROCEDURE — C9113 INJ PANTOPRAZOLE SODIUM, VIA: HCPCS | Performed by: INTERNAL MEDICINE

## 2022-01-01 PROCEDURE — 99285 EMERGENCY DEPT VISIT HI MDM: CPT

## 2022-01-01 PROCEDURE — 2700000000 HC OXYGEN THERAPY PER DAY

## 2022-01-01 PROCEDURE — 99233 SBSQ HOSP IP/OBS HIGH 50: CPT | Performed by: INTERNAL MEDICINE

## 2022-01-01 PROCEDURE — 3700000000 HC ANESTHESIA ATTENDED CARE: Performed by: COLON & RECTAL SURGERY

## 2022-01-01 PROCEDURE — 6360000002 HC RX W HCPCS: Performed by: INTERNAL MEDICINE

## 2022-01-01 PROCEDURE — 2580000003 HC RX 258: Performed by: INTERNAL MEDICINE

## 2022-01-01 PROCEDURE — 84484 ASSAY OF TROPONIN QUANT: CPT

## 2022-01-01 PROCEDURE — 88304 TISSUE EXAM BY PATHOLOGIST: CPT

## 2022-01-01 PROCEDURE — 2580000003 HC RX 258: Performed by: COLON & RECTAL SURGERY

## 2022-01-01 PROCEDURE — 76705 ECHO EXAM OF ABDOMEN: CPT

## 2022-01-01 PROCEDURE — A4216 STERILE WATER/SALINE, 10 ML: HCPCS | Performed by: INTERNAL MEDICINE

## 2022-01-01 PROCEDURE — 85025 COMPLETE CBC W/AUTO DIFF WBC: CPT

## 2022-01-01 PROCEDURE — 87040 BLOOD CULTURE FOR BACTERIA: CPT

## 2022-01-01 PROCEDURE — 6360000002 HC RX W HCPCS: Performed by: STUDENT IN AN ORGANIZED HEALTH CARE EDUCATION/TRAINING PROGRAM

## 2022-01-01 PROCEDURE — 6360000004 HC RX CONTRAST MEDICATION: Performed by: PHYSICIAN ASSISTANT

## 2022-01-01 PROCEDURE — 2060000000 HC ICU INTERMEDIATE R&B

## 2022-01-01 PROCEDURE — 6370000000 HC RX 637 (ALT 250 FOR IP): Performed by: COLON & RECTAL SURGERY

## 2022-01-01 PROCEDURE — 6360000002 HC RX W HCPCS: Performed by: NURSE PRACTITIONER

## 2022-01-01 PROCEDURE — 94002 VENT MGMT INPAT INIT DAY: CPT

## 2022-01-01 PROCEDURE — 6370000000 HC RX 637 (ALT 250 FOR IP)

## 2022-01-01 PROCEDURE — 82435 ASSAY OF BLOOD CHLORIDE: CPT

## 2022-01-01 PROCEDURE — 80202 ASSAY OF VANCOMYCIN: CPT

## 2022-01-01 PROCEDURE — 2580000003 HC RX 258: Performed by: NURSE PRACTITIONER

## 2022-01-01 PROCEDURE — 94003 VENT MGMT INPAT SUBQ DAY: CPT

## 2022-01-01 PROCEDURE — C1758 CATHETER, URETERAL: HCPCS | Performed by: COLON & RECTAL SURGERY

## 2022-01-01 PROCEDURE — 85014 HEMATOCRIT: CPT

## 2022-01-01 PROCEDURE — 84132 ASSAY OF SERUM POTASSIUM: CPT

## 2022-01-01 PROCEDURE — 82803 BLOOD GASES ANY COMBINATION: CPT

## 2022-01-01 PROCEDURE — 6360000002 HC RX W HCPCS: Performed by: COLON & RECTAL SURGERY

## 2022-01-01 PROCEDURE — 97167 OT EVAL HIGH COMPLEX 60 MIN: CPT

## 2022-01-01 PROCEDURE — 6370000000 HC RX 637 (ALT 250 FOR IP): Performed by: NURSE PRACTITIONER

## 2022-01-01 PROCEDURE — 83605 ASSAY OF LACTIC ACID: CPT

## 2022-01-01 PROCEDURE — 36620 INSERTION CATHETER ARTERY: CPT

## 2022-01-01 PROCEDURE — 6360000004 HC RX CONTRAST MEDICATION: Performed by: NURSE PRACTITIONER

## 2022-01-01 PROCEDURE — G8417 CALC BMI ABV UP PARAM F/U: HCPCS | Performed by: INTERNAL MEDICINE

## 2022-01-01 PROCEDURE — 74150 CT ABDOMEN W/O CONTRAST: CPT

## 2022-01-01 PROCEDURE — 82565 ASSAY OF CREATININE: CPT

## 2022-01-01 PROCEDURE — 83735 ASSAY OF MAGNESIUM: CPT

## 2022-01-01 PROCEDURE — 70450 CT HEAD/BRAIN W/O DYE: CPT

## 2022-01-01 PROCEDURE — 2500000003 HC RX 250 WO HCPCS: Performed by: INTERNAL MEDICINE

## 2022-01-01 PROCEDURE — B5181ZA FLUOROSCOPY OF SUPERIOR VENA CAVA USING LOW OSMOLAR CONTRAST, GUIDANCE: ICD-10-PCS | Performed by: RADIOLOGY

## 2022-01-01 PROCEDURE — 02H633Z INSERTION OF INFUSION DEVICE INTO RIGHT ATRIUM, PERCUTANEOUS APPROACH: ICD-10-PCS | Performed by: RADIOLOGY

## 2022-01-01 PROCEDURE — 93010 ELECTROCARDIOGRAM REPORT: CPT | Performed by: INTERNAL MEDICINE

## 2022-01-01 PROCEDURE — 86141 C-REACTIVE PROTEIN HS: CPT

## 2022-01-01 PROCEDURE — 99221 1ST HOSP IP/OBS SF/LOW 40: CPT | Performed by: UROLOGY

## 2022-01-01 PROCEDURE — 84145 PROCALCITONIN (PCT): CPT

## 2022-01-01 PROCEDURE — 6360000004 HC RX CONTRAST MEDICATION: Performed by: EMERGENCY MEDICINE

## 2022-01-01 PROCEDURE — 1210000000 HC MED SURG R&B

## 2022-01-01 PROCEDURE — 2500000003 HC RX 250 WO HCPCS: Performed by: NURSE ANESTHETIST, CERTIFIED REGISTERED

## 2022-01-01 PROCEDURE — A9540 TC99M MAA: HCPCS | Performed by: INTERNAL MEDICINE

## 2022-01-01 PROCEDURE — 0FT44ZZ RESECTION OF GALLBLADDER, PERCUTANEOUS ENDOSCOPIC APPROACH: ICD-10-PCS | Performed by: COLON & RECTAL SURGERY

## 2022-01-01 PROCEDURE — 82948 REAGENT STRIP/BLOOD GLUCOSE: CPT

## 2022-01-01 PROCEDURE — 94660 CPAP INITIATION&MGMT: CPT

## 2022-01-01 PROCEDURE — 92950 HEART/LUNG RESUSCITATION CPR: CPT

## 2022-01-01 PROCEDURE — 74300 X-RAY BILE DUCTS/PANCREAS: CPT

## 2022-01-01 PROCEDURE — 86140 C-REACTIVE PROTEIN: CPT

## 2022-01-01 PROCEDURE — 6360000002 HC RX W HCPCS: Performed by: NURSE ANESTHETIST, CERTIFIED REGISTERED

## 2022-01-01 PROCEDURE — 99291 CRITICAL CARE FIRST HOUR: CPT | Performed by: INTERNAL MEDICINE

## 2022-01-01 PROCEDURE — 2500000003 HC RX 250 WO HCPCS: Performed by: EMERGENCY MEDICINE

## 2022-01-01 PROCEDURE — 37799 UNLISTED PX VASCULAR SURGERY: CPT

## 2022-01-01 PROCEDURE — 99232 SBSQ HOSP IP/OBS MODERATE 35: CPT | Performed by: NURSE PRACTITIONER

## 2022-01-01 PROCEDURE — 99292 CRITICAL CARE ADDL 30 MIN: CPT | Performed by: INTERNAL MEDICINE

## 2022-01-01 PROCEDURE — 96376 TX/PRO/DX INJ SAME DRUG ADON: CPT

## 2022-01-01 PROCEDURE — 2000000000 HC ICU R&B

## 2022-01-01 PROCEDURE — 3017F COLORECTAL CA SCREEN DOC REV: CPT | Performed by: INTERNAL MEDICINE

## 2022-01-01 PROCEDURE — 81003 URINALYSIS AUTO W/O SCOPE: CPT

## 2022-01-01 PROCEDURE — 82330 ASSAY OF CALCIUM: CPT

## 2022-01-01 PROCEDURE — 99024 POSTOP FOLLOW-UP VISIT: CPT | Performed by: COLON & RECTAL SURGERY

## 2022-01-01 PROCEDURE — 84100 ASSAY OF PHOSPHORUS: CPT

## 2022-01-01 PROCEDURE — 96365 THER/PROPH/DIAG IV INF INIT: CPT

## 2022-01-01 PROCEDURE — 36415 COLL VENOUS BLD VENIPUNCTURE: CPT

## 2022-01-01 PROCEDURE — G8484 FLU IMMUNIZE NO ADMIN: HCPCS | Performed by: INTERNAL MEDICINE

## 2022-01-01 PROCEDURE — 51701 INSERT BLADDER CATHETER: CPT

## 2022-01-01 PROCEDURE — 93005 ELECTROCARDIOGRAM TRACING: CPT | Performed by: INTERNAL MEDICINE

## 2022-01-01 PROCEDURE — 74018 RADEX ABDOMEN 1 VIEW: CPT

## 2022-01-01 PROCEDURE — 99232 SBSQ HOSP IP/OBS MODERATE 35: CPT | Performed by: INTERNAL MEDICINE

## 2022-01-01 PROCEDURE — 94664 DEMO&/EVAL PT USE INHALER: CPT

## 2022-01-01 PROCEDURE — 99214 OFFICE O/P EST MOD 30 MIN: CPT | Performed by: INTERNAL MEDICINE

## 2022-01-01 PROCEDURE — 99254 IP/OBS CNSLTJ NEW/EST MOD 60: CPT | Performed by: PSYCHIATRY & NEUROLOGY

## 2022-01-01 PROCEDURE — 1036F TOBACCO NON-USER: CPT | Performed by: PSYCHIATRY & NEUROLOGY

## 2022-01-01 PROCEDURE — 82962 GLUCOSE BLOOD TEST: CPT | Performed by: INTERNAL MEDICINE

## 2022-01-01 PROCEDURE — 94799 UNLISTED PULMONARY SVC/PX: CPT

## 2022-01-01 PROCEDURE — 81001 URINALYSIS AUTO W/SCOPE: CPT

## 2022-01-01 PROCEDURE — 87086 URINE CULTURE/COLONY COUNT: CPT

## 2022-01-01 PROCEDURE — 31500 INSERT EMERGENCY AIRWAY: CPT | Performed by: INTERNAL MEDICINE

## 2022-01-01 PROCEDURE — 74176 CT ABD & PELVIS W/O CONTRAST: CPT

## 2022-01-01 PROCEDURE — 6360000002 HC RX W HCPCS: Performed by: PHYSICIAN ASSISTANT

## 2022-01-01 PROCEDURE — 97535 SELF CARE MNGMENT TRAINING: CPT

## 2022-01-01 PROCEDURE — 82550 ASSAY OF CK (CPK): CPT

## 2022-01-01 PROCEDURE — 6370000000 HC RX 637 (ALT 250 FOR IP): Performed by: UROLOGY

## 2022-01-01 PROCEDURE — BF101ZZ FLUOROSCOPY OF BILE DUCTS USING LOW OSMOLAR CONTRAST: ICD-10-PCS | Performed by: COLON & RECTAL SURGERY

## 2022-01-01 PROCEDURE — 96375 TX/PRO/DX INJ NEW DRUG ADDON: CPT

## 2022-01-01 PROCEDURE — 31500 INSERT EMERGENCY AIRWAY: CPT

## 2022-01-01 PROCEDURE — 85520 HEPARIN ASSAY: CPT

## 2022-01-01 PROCEDURE — 71045 X-RAY EXAM CHEST 1 VIEW: CPT

## 2022-01-01 PROCEDURE — 6370000000 HC RX 637 (ALT 250 FOR IP): Performed by: STUDENT IN AN ORGANIZED HEALTH CARE EDUCATION/TRAINING PROGRAM

## 2022-01-01 PROCEDURE — 99231 SBSQ HOSP IP/OBS SF/LOW 25: CPT | Performed by: UROLOGY

## 2022-01-01 PROCEDURE — 51702 INSERT TEMP BLADDER CATH: CPT

## 2022-01-01 PROCEDURE — 83690 ASSAY OF LIPASE: CPT

## 2022-01-01 PROCEDURE — 36620 INSERTION CATHETER ARTERY: CPT | Performed by: INTERNAL MEDICINE

## 2022-01-01 PROCEDURE — 36556 INSERT NON-TUNNEL CV CATH: CPT | Performed by: INTERNAL MEDICINE

## 2022-01-01 PROCEDURE — 36600 WITHDRAWAL OF ARTERIAL BLOOD: CPT

## 2022-01-01 PROCEDURE — 2580000003 HC RX 258

## 2022-01-01 PROCEDURE — 2709999900 HC NON-CHARGEABLE SUPPLY: Performed by: COLON & RECTAL SURGERY

## 2022-01-01 PROCEDURE — 85652 RBC SED RATE AUTOMATED: CPT

## 2022-01-01 PROCEDURE — A4216 STERILE WATER/SALINE, 10 ML: HCPCS | Performed by: EMERGENCY MEDICINE

## 2022-01-01 PROCEDURE — 36592 COLLECT BLOOD FROM PICC: CPT

## 2022-01-01 PROCEDURE — G8427 DOCREV CUR MEDS BY ELIG CLIN: HCPCS | Performed by: PSYCHIATRY & NEUROLOGY

## 2022-01-01 PROCEDURE — 76937 US GUIDE VASCULAR ACCESS: CPT | Performed by: INTERNAL MEDICINE

## 2022-01-01 PROCEDURE — 36573 INSJ PICC RS&I 5 YR+: CPT | Performed by: RADIOLOGY

## 2022-01-01 PROCEDURE — P9047 ALBUMIN (HUMAN), 25%, 50ML: HCPCS | Performed by: INTERNAL MEDICINE

## 2022-01-01 PROCEDURE — 3600000014 HC SURGERY LEVEL 4 ADDTL 15MIN: Performed by: COLON & RECTAL SURGERY

## 2022-01-01 PROCEDURE — 1111F DSCHRG MED/CURRENT MED MERGE: CPT | Performed by: PSYCHIATRY & NEUROLOGY

## 2022-01-01 PROCEDURE — 3430000000 HC RX DIAGNOSTIC RADIOPHARMACEUTICAL: Performed by: INTERNAL MEDICINE

## 2022-01-01 PROCEDURE — 47563 LAPARO CHOLECYSTECTOMY/GRAPH: CPT | Performed by: COLON & RECTAL SURGERY

## 2022-01-01 PROCEDURE — 2500000003 HC RX 250 WO HCPCS

## 2022-01-01 PROCEDURE — 1036F TOBACCO NON-USER: CPT | Performed by: INTERNAL MEDICINE

## 2022-01-01 PROCEDURE — 2580000003 HC RX 258: Performed by: PHYSICIAN ASSISTANT

## 2022-01-01 PROCEDURE — 2022F DILAT RTA XM EVC RTNOPTHY: CPT | Performed by: INTERNAL MEDICINE

## 2022-01-01 PROCEDURE — 93005 ELECTROCARDIOGRAM TRACING: CPT | Performed by: PHYSICIAN ASSISTANT

## 2022-01-01 PROCEDURE — 92610 EVALUATE SWALLOWING FUNCTION: CPT

## 2022-01-01 PROCEDURE — 83880 ASSAY OF NATRIURETIC PEPTIDE: CPT

## 2022-01-01 PROCEDURE — 3600000004 HC SURGERY LEVEL 4 BASE: Performed by: COLON & RECTAL SURGERY

## 2022-01-01 PROCEDURE — 6360000002 HC RX W HCPCS: Performed by: EMERGENCY MEDICINE

## 2022-01-01 PROCEDURE — 2580000003 HC RX 258: Performed by: EMERGENCY MEDICINE

## 2022-01-01 PROCEDURE — 64486 TAP BLOCK UNIL BY INJECTION: CPT | Performed by: ANESTHESIOLOGY

## 2022-01-01 PROCEDURE — 3051F HG A1C>EQUAL 7.0%<8.0%: CPT | Performed by: INTERNAL MEDICINE

## 2022-01-01 PROCEDURE — 96361 HYDRATE IV INFUSION ADD-ON: CPT

## 2022-01-01 PROCEDURE — 99223 1ST HOSP IP/OBS HIGH 75: CPT | Performed by: INTERNAL MEDICINE

## 2022-01-01 PROCEDURE — 87150 DNA/RNA AMPLIFIED PROBE: CPT

## 2022-01-01 PROCEDURE — 74177 CT ABD & PELVIS W/CONTRAST: CPT

## 2022-01-01 PROCEDURE — 99305 1ST NF CARE MODERATE MDM 35: CPT | Performed by: INTERNAL MEDICINE

## 2022-01-01 PROCEDURE — 7100000000 HC PACU RECOVERY - FIRST 15 MIN: Performed by: COLON & RECTAL SURGERY

## 2022-01-01 PROCEDURE — 7100000001 HC PACU RECOVERY - ADDTL 15 MIN: Performed by: COLON & RECTAL SURGERY

## 2022-01-01 PROCEDURE — 99213 OFFICE O/P EST LOW 20 MIN: CPT | Performed by: INTERNAL MEDICINE

## 2022-01-01 PROCEDURE — 87186 SC STD MICRODIL/AGAR DIL: CPT

## 2022-01-01 PROCEDURE — 6370000000 HC RX 637 (ALT 250 FOR IP): Performed by: PHYSICIAN ASSISTANT

## 2022-01-01 PROCEDURE — 2709999900 IR PICC WO SQ PORT/PUMP > 5 YEARS

## 2022-01-01 PROCEDURE — 99222 1ST HOSP IP/OBS MODERATE 55: CPT | Performed by: UROLOGY

## 2022-01-01 PROCEDURE — 93970 EXTREMITY STUDY: CPT

## 2022-01-01 PROCEDURE — 3017F COLORECTAL CA SCREEN DOC REV: CPT | Performed by: PSYCHIATRY & NEUROLOGY

## 2022-01-01 PROCEDURE — 99309 SBSQ NF CARE MODERATE MDM 30: CPT | Performed by: NURSE PRACTITIONER

## 2022-01-01 PROCEDURE — 90792 PSYCH DIAG EVAL W/MED SRVCS: CPT | Performed by: PSYCHIATRY & NEUROLOGY

## 2022-01-01 PROCEDURE — 3700000001 HC ADD 15 MINUTES (ANESTHESIA): Performed by: COLON & RECTAL SURGERY

## 2022-01-01 PROCEDURE — 93306 TTE W/DOPPLER COMPLETE: CPT

## 2022-01-01 PROCEDURE — 71275 CT ANGIOGRAPHY CHEST: CPT

## 2022-01-01 PROCEDURE — G8417 CALC BMI ABV UP PARAM F/U: HCPCS | Performed by: PSYCHIATRY & NEUROLOGY

## 2022-01-01 PROCEDURE — G8427 DOCREV CUR MEDS BY ELIG CLIN: HCPCS | Performed by: INTERNAL MEDICINE

## 2022-01-01 PROCEDURE — 96374 THER/PROPH/DIAG INJ IV PUSH: CPT

## 2022-01-01 PROCEDURE — 99215 OFFICE O/P EST HI 40 MIN: CPT | Performed by: PSYCHIATRY & NEUROLOGY

## 2022-01-01 PROCEDURE — 6370000000 HC RX 637 (ALT 250 FOR IP): Performed by: EMERGENCY MEDICINE

## 2022-01-01 PROCEDURE — 2500000003 HC RX 250 WO HCPCS: Performed by: ANESTHESIOLOGY

## 2022-01-01 PROCEDURE — A4217 STERILE WATER/SALINE, 500 ML: HCPCS | Performed by: COLON & RECTAL SURGERY

## 2022-01-01 PROCEDURE — 6360000002 HC RX W HCPCS

## 2022-01-01 PROCEDURE — 71046 X-RAY EXAM CHEST 2 VIEWS: CPT

## 2022-01-01 PROCEDURE — 87070 CULTURE OTHR SPECIMN AEROBIC: CPT

## 2022-01-01 PROCEDURE — 6360000004 HC RX CONTRAST MEDICATION: Performed by: COLON & RECTAL SURGERY

## 2022-01-01 PROCEDURE — 85610 PROTHROMBIN TIME: CPT

## 2022-01-01 PROCEDURE — 97162 PT EVAL MOD COMPLEX 30 MIN: CPT

## 2022-01-01 PROCEDURE — 87077 CULTURE AEROBIC IDENTIFY: CPT

## 2022-01-01 PROCEDURE — 36573 INSJ PICC RS&I 5 YR+: CPT

## 2022-01-01 PROCEDURE — APPSS45 APP SPLIT SHARED TIME 31-45 MINUTES: Performed by: NURSE PRACTITIONER

## 2022-01-01 PROCEDURE — 0BH17EZ INSERTION OF ENDOTRACHEAL AIRWAY INTO TRACHEA, VIA NATURAL OR ARTIFICIAL OPENING: ICD-10-PCS | Performed by: INTERNAL MEDICINE

## 2022-01-01 PROCEDURE — 51798 US URINE CAPACITY MEASURE: CPT

## 2022-01-01 PROCEDURE — 96372 THER/PROPH/DIAG INJ SC/IM: CPT

## 2022-01-01 PROCEDURE — 78580 LUNG PERFUSION IMAGING: CPT

## 2022-01-01 PROCEDURE — 99253 IP/OBS CNSLTJ NEW/EST LOW 45: CPT | Performed by: NURSE PRACTITIONER

## 2022-01-01 PROCEDURE — 86403 PARTICLE AGGLUT ANTBDY SCRN: CPT

## 2022-01-01 PROCEDURE — 36680 INSERT NEEDLE BONE CAVITY: CPT

## 2022-01-01 PROCEDURE — 5A1935Z RESPIRATORY VENTILATION, LESS THAN 24 CONSECUTIVE HOURS: ICD-10-PCS | Performed by: INTERNAL MEDICINE

## 2022-01-01 RX ORDER — ONDANSETRON 2 MG/ML
4 INJECTION INTRAMUSCULAR; INTRAVENOUS ONCE
Status: COMPLETED | OUTPATIENT
Start: 2022-01-01 | End: 2022-01-01

## 2022-01-01 RX ORDER — GABAPENTIN 300 MG/1
300 CAPSULE ORAL NIGHTLY
Status: DISCONTINUED | OUTPATIENT
Start: 2022-01-01 | End: 2022-01-01 | Stop reason: HOSPADM

## 2022-01-01 RX ORDER — SODIUM CHLORIDE 9 MG/ML
INJECTION, SOLUTION INTRAVENOUS
Status: COMPLETED
Start: 2022-01-01 | End: 2022-01-01

## 2022-01-01 RX ORDER — CARVEDILOL 25 MG/1
25 TABLET ORAL 2 TIMES DAILY WITH MEALS
Status: DISCONTINUED | OUTPATIENT
Start: 2022-01-01 | End: 2022-01-01 | Stop reason: HOSPADM

## 2022-01-01 RX ORDER — ACETAMINOPHEN 325 MG/1
650 TABLET ORAL EVERY 6 HOURS PRN
Status: DISCONTINUED | OUTPATIENT
Start: 2022-01-01 | End: 2022-01-01

## 2022-01-01 RX ORDER — DICYCLOMINE HYDROCHLORIDE 10 MG/5ML
20 SOLUTION ORAL 4 TIMES DAILY PRN
Status: DISCONTINUED | OUTPATIENT
Start: 2022-01-01 | End: 2022-01-01 | Stop reason: HOSPADM

## 2022-01-01 RX ORDER — POTASSIUM CHLORIDE 20 MEQ/1
20 TABLET, EXTENDED RELEASE ORAL 2 TIMES DAILY WITH MEALS
Status: DISCONTINUED | OUTPATIENT
Start: 2022-01-01 | End: 2022-01-01 | Stop reason: HOSPADM

## 2022-01-01 RX ORDER — OXYBUTYNIN CHLORIDE 5 MG/1
5 TABLET, EXTENDED RELEASE ORAL NIGHTLY
Status: DISCONTINUED | OUTPATIENT
Start: 2022-01-01 | End: 2022-01-01

## 2022-01-01 RX ORDER — EPINEPHRINE 0.1 MG/ML
SYRINGE (ML) INJECTION
Status: COMPLETED | OUTPATIENT
Start: 2022-01-01 | End: 2022-01-01

## 2022-01-01 RX ORDER — INSULIN LISPRO 100 [IU]/ML
0-6 INJECTION, SOLUTION INTRAVENOUS; SUBCUTANEOUS
Status: DISCONTINUED | OUTPATIENT
Start: 2022-01-01 | End: 2022-01-01 | Stop reason: HOSPADM

## 2022-01-01 RX ORDER — FENTANYL CITRATE 50 UG/ML
25 INJECTION, SOLUTION INTRAMUSCULAR; INTRAVENOUS EVERY 10 MIN PRN
Status: DISCONTINUED | OUTPATIENT
Start: 2022-01-01 | End: 2022-01-01

## 2022-01-01 RX ORDER — HYDRALAZINE HYDROCHLORIDE 50 MG/1
50 TABLET, FILM COATED ORAL EVERY 8 HOURS SCHEDULED
Status: DISCONTINUED | OUTPATIENT
Start: 2022-01-01 | End: 2022-01-01 | Stop reason: HOSPADM

## 2022-01-01 RX ORDER — ONDANSETRON 4 MG/1
4 TABLET, ORALLY DISINTEGRATING ORAL EVERY 8 HOURS PRN
Status: DISCONTINUED | OUTPATIENT
Start: 2022-01-01 | End: 2022-01-01 | Stop reason: HOSPADM

## 2022-01-01 RX ORDER — ENOXAPARIN SODIUM 100 MG/ML
30 INJECTION SUBCUTANEOUS 2 TIMES DAILY
Status: DISCONTINUED | OUTPATIENT
Start: 2022-01-01 | End: 2022-01-01 | Stop reason: HOSPADM

## 2022-01-01 RX ORDER — MORPHINE SULFATE 2 MG/ML
1 INJECTION, SOLUTION INTRAMUSCULAR; INTRAVENOUS EVERY 4 HOURS PRN
Status: DISCONTINUED | OUTPATIENT
Start: 2022-01-01 | End: 2022-01-01

## 2022-01-01 RX ORDER — MONTELUKAST SODIUM 10 MG/1
10 TABLET ORAL NIGHTLY
Status: DISCONTINUED | OUTPATIENT
Start: 2022-01-01 | End: 2022-01-01 | Stop reason: HOSPADM

## 2022-01-01 RX ORDER — INSULIN LISPRO 100 [IU]/ML
0-3 INJECTION, SOLUTION INTRAVENOUS; SUBCUTANEOUS NIGHTLY
Status: DISCONTINUED | OUTPATIENT
Start: 2022-01-01 | End: 2022-01-01 | Stop reason: HOSPADM

## 2022-01-01 RX ORDER — METOPROLOL TARTRATE 5 MG/5ML
5 INJECTION INTRAVENOUS EVERY 6 HOURS SCHEDULED
Status: DISCONTINUED | OUTPATIENT
Start: 2022-01-01 | End: 2022-01-01

## 2022-01-01 RX ORDER — ROCURONIUM BROMIDE 10 MG/ML
INJECTION, SOLUTION INTRAVENOUS
Status: DISPENSED
Start: 2022-01-01 | End: 2022-01-01

## 2022-01-01 RX ORDER — INSULIN GLARGINE 100 [IU]/ML
60 INJECTION, SOLUTION SUBCUTANEOUS NIGHTLY
Status: DISCONTINUED | OUTPATIENT
Start: 2022-01-01 | End: 2022-01-01 | Stop reason: HOSPADM

## 2022-01-01 RX ORDER — METOCLOPRAMIDE HYDROCHLORIDE 5 MG/ML
10 INJECTION INTRAMUSCULAR; INTRAVENOUS
Status: ACTIVE | OUTPATIENT
Start: 2022-01-01 | End: 2022-01-01

## 2022-01-01 RX ORDER — ASPIRIN 81 MG/1
81 TABLET ORAL DAILY
Status: DISCONTINUED | OUTPATIENT
Start: 2022-01-01 | End: 2022-01-01 | Stop reason: HOSPADM

## 2022-01-01 RX ORDER — INSULIN LISPRO 100 [IU]/ML
0-6 INJECTION, SOLUTION INTRAVENOUS; SUBCUTANEOUS EVERY 6 HOURS SCHEDULED
Status: DISCONTINUED | OUTPATIENT
Start: 2022-01-01 | End: 2022-01-01

## 2022-01-01 RX ORDER — CARVEDILOL 6.25 MG/1
6.25 TABLET ORAL 2 TIMES DAILY WITH MEALS
COMMUNITY

## 2022-01-01 RX ORDER — SODIUM CHLORIDE 9 MG/ML
INJECTION, SOLUTION INTRAVENOUS PRN
Status: DISCONTINUED | OUTPATIENT
Start: 2022-01-01 | End: 2022-01-01 | Stop reason: HOSPADM

## 2022-01-01 RX ORDER — SODIUM CHLORIDE 9 MG/ML
INJECTION, SOLUTION INTRAVENOUS CONTINUOUS PRN
Status: COMPLETED | OUTPATIENT
Start: 2022-01-01 | End: 2022-01-01

## 2022-01-01 RX ORDER — HEPARIN SODIUM 10000 [USP'U]/100ML
5-30 INJECTION, SOLUTION INTRAVENOUS CONTINUOUS
Status: DISCONTINUED | OUTPATIENT
Start: 2022-01-01 | End: 2022-01-01

## 2022-01-01 RX ORDER — MECLIZINE HYDROCHLORIDE 25 MG/1
25 TABLET ORAL 2 TIMES DAILY
Status: DISCONTINUED | OUTPATIENT
Start: 2022-01-01 | End: 2022-01-01 | Stop reason: HOSPADM

## 2022-01-01 RX ORDER — METOPROLOL TARTRATE 5 MG/5ML
5 INJECTION INTRAVENOUS EVERY 6 HOURS
Status: DISCONTINUED | OUTPATIENT
Start: 2022-01-01 | End: 2022-01-01

## 2022-01-01 RX ORDER — DEXTROSE MONOHYDRATE 25 G/50ML
INJECTION, SOLUTION INTRAVENOUS
Status: COMPLETED | OUTPATIENT
Start: 2022-01-01 | End: 2022-01-01

## 2022-01-01 RX ORDER — SODIUM CHLORIDE 0.9 % (FLUSH) 0.9 %
5-40 SYRINGE (ML) INJECTION EVERY 12 HOURS SCHEDULED
Status: DISCONTINUED | OUTPATIENT
Start: 2022-01-01 | End: 2022-01-01 | Stop reason: HOSPADM

## 2022-01-01 RX ORDER — PRAZOSIN HYDROCHLORIDE 1 MG/1
1 CAPSULE ORAL NIGHTLY
Status: DISCONTINUED | OUTPATIENT
Start: 2022-01-01 | End: 2022-01-01 | Stop reason: HOSPADM

## 2022-01-01 RX ORDER — ISOSORBIDE DINITRATE 20 MG/1
20 TABLET ORAL 3 TIMES DAILY
Status: DISCONTINUED | OUTPATIENT
Start: 2022-01-01 | End: 2022-01-01 | Stop reason: HOSPADM

## 2022-01-01 RX ORDER — METOPROLOL SUCCINATE 50 MG/1
50 TABLET, EXTENDED RELEASE ORAL 2 TIMES DAILY
Status: DISCONTINUED | OUTPATIENT
Start: 2022-01-01 | End: 2022-01-01

## 2022-01-01 RX ORDER — FENTANYL CITRATE 50 UG/ML
INJECTION, SOLUTION INTRAMUSCULAR; INTRAVENOUS
Status: DISPENSED
Start: 2022-01-01 | End: 2022-01-01

## 2022-01-01 RX ORDER — ALBUTEROL SULFATE 2.5 MG/3ML
2.5 SOLUTION RESPIRATORY (INHALATION) EVERY 4 HOURS PRN
Status: DISCONTINUED | OUTPATIENT
Start: 2022-01-01 | End: 2022-01-01 | Stop reason: HOSPADM

## 2022-01-01 RX ORDER — LEVOTHYROXINE SODIUM 0.05 MG/1
TABLET ORAL
Qty: 30 TABLET | Refills: 5 | Status: SHIPPED | OUTPATIENT
Start: 2022-01-01

## 2022-01-01 RX ORDER — DULAGLUTIDE 0.75 MG/.5ML
0.75 INJECTION, SOLUTION SUBCUTANEOUS WEEKLY
Qty: 4 PEN | Refills: 3 | Status: SHIPPED | OUTPATIENT
Start: 2022-01-01

## 2022-01-01 RX ORDER — MORPHINE SULFATE 2 MG/ML
1 INJECTION, SOLUTION INTRAMUSCULAR; INTRAVENOUS
Status: DISCONTINUED | OUTPATIENT
Start: 2022-01-01 | End: 2022-01-01

## 2022-01-01 RX ORDER — ALBUTEROL SULFATE 90 UG/1
2 AEROSOL, METERED RESPIRATORY (INHALATION) PRN
Status: DISCONTINUED | OUTPATIENT
Start: 2022-01-01 | End: 2022-01-01 | Stop reason: HOSPADM

## 2022-01-01 RX ORDER — IRON ASPGLY,PS/C/B12/FA/CA/SUC 150-25-1
1 CAPSULE ORAL DAILY
Status: DISCONTINUED | OUTPATIENT
Start: 2022-01-01 | End: 2022-01-01 | Stop reason: HOSPADM

## 2022-01-01 RX ORDER — NICOTINE POLACRILEX 4 MG
15 LOZENGE BUCCAL PRN
Status: DISCONTINUED | OUTPATIENT
Start: 2022-01-01 | End: 2022-01-01

## 2022-01-01 RX ORDER — SODIUM PHOSPHATE, DIBASIC AND SODIUM PHOSPHATE, MONOBASIC 7; 19 G/133ML; G/133ML
1 ENEMA RECTAL DAILY PRN
Status: DISCONTINUED | OUTPATIENT
Start: 2022-01-01 | End: 2022-01-01 | Stop reason: SDUPTHER

## 2022-01-01 RX ORDER — OXYBUTYNIN CHLORIDE 5 MG/1
5 TABLET, EXTENDED RELEASE ORAL DAILY
Status: DISCONTINUED | OUTPATIENT
Start: 2022-01-01 | End: 2022-01-01 | Stop reason: HOSPADM

## 2022-01-01 RX ORDER — PROPOFOL 10 MG/ML
5-50 INJECTION, EMULSION INTRAVENOUS CONTINUOUS
Status: DISCONTINUED | OUTPATIENT
Start: 2022-01-01 | End: 2022-01-01

## 2022-01-01 RX ORDER — 0.9 % SODIUM CHLORIDE 0.9 %
1000 INTRAVENOUS SOLUTION INTRAVENOUS ONCE
Status: COMPLETED | OUTPATIENT
Start: 2022-01-01 | End: 2022-01-01

## 2022-01-01 RX ORDER — EPHEDRINE SULFATE/0.9% NACL/PF 50 MG/5 ML
SYRINGE (ML) INTRAVENOUS PRN
Status: DISCONTINUED | OUTPATIENT
Start: 2022-01-01 | End: 2022-01-01 | Stop reason: SDUPTHER

## 2022-01-01 RX ORDER — GABAPENTIN 300 MG/1
600 CAPSULE ORAL 2 TIMES DAILY
Status: DISCONTINUED | OUTPATIENT
Start: 2022-01-01 | End: 2022-01-01

## 2022-01-01 RX ORDER — PROPOFOL 10 MG/ML
INJECTION, EMULSION INTRAVENOUS
Status: DISPENSED
Start: 2022-01-01 | End: 2022-01-01

## 2022-01-01 RX ORDER — INSULIN LISPRO 100 [IU]/ML
0-6 INJECTION, SOLUTION INTRAVENOUS; SUBCUTANEOUS
Status: DISCONTINUED | OUTPATIENT
Start: 2022-01-01 | End: 2022-01-01

## 2022-01-01 RX ORDER — FUROSEMIDE 10 MG/ML
20 INJECTION INTRAMUSCULAR; INTRAVENOUS ONCE
Status: COMPLETED | OUTPATIENT
Start: 2022-01-01 | End: 2022-01-01

## 2022-01-01 RX ORDER — INSULIN LISPRO 100 [IU]/ML
15 INJECTION, SOLUTION INTRAVENOUS; SUBCUTANEOUS
Status: DISCONTINUED | OUTPATIENT
Start: 2022-01-01 | End: 2022-01-01 | Stop reason: HOSPADM

## 2022-01-01 RX ORDER — ALBUMIN (HUMAN) 12.5 G/50ML
25 SOLUTION INTRAVENOUS ONCE
Status: COMPLETED | OUTPATIENT
Start: 2022-01-01 | End: 2022-01-01

## 2022-01-01 RX ORDER — HALOPERIDOL 5 MG
5 TABLET ORAL DAILY
Status: DISCONTINUED | OUTPATIENT
Start: 2022-01-01 | End: 2022-01-01 | Stop reason: HOSPADM

## 2022-01-01 RX ORDER — LEVOTHYROXINE SODIUM 0.05 MG/1
50 TABLET ORAL DAILY
Status: DISCONTINUED | OUTPATIENT
Start: 2022-01-01 | End: 2022-01-01 | Stop reason: HOSPADM

## 2022-01-01 RX ORDER — SODIUM CHLORIDE 9 MG/ML
25 INJECTION, SOLUTION INTRAVENOUS PRN
Status: DISCONTINUED | OUTPATIENT
Start: 2022-01-01 | End: 2022-01-01 | Stop reason: HOSPADM

## 2022-01-01 RX ORDER — KETOROLAC TROMETHAMINE 30 MG/ML
30 INJECTION, SOLUTION INTRAMUSCULAR; INTRAVENOUS ONCE
Status: COMPLETED | OUTPATIENT
Start: 2022-01-01 | End: 2022-01-01

## 2022-01-01 RX ORDER — FUROSEMIDE 40 MG/1
40 TABLET ORAL EVERY MORNING
Status: DISCONTINUED | OUTPATIENT
Start: 2022-01-01 | End: 2022-01-01 | Stop reason: HOSPADM

## 2022-01-01 RX ORDER — RANOLAZINE 500 MG/1
1000 TABLET, EXTENDED RELEASE ORAL 2 TIMES DAILY
Status: DISCONTINUED | OUTPATIENT
Start: 2022-01-01 | End: 2022-01-01 | Stop reason: HOSPADM

## 2022-01-01 RX ORDER — ATORVASTATIN CALCIUM 80 MG/1
80 TABLET, FILM COATED ORAL NIGHTLY
Status: DISCONTINUED | OUTPATIENT
Start: 2022-01-01 | End: 2022-01-01 | Stop reason: HOSPADM

## 2022-01-01 RX ORDER — OXYCODONE HYDROCHLORIDE AND ACETAMINOPHEN 5; 325 MG/1; MG/1
1 TABLET ORAL EVERY 4 HOURS PRN
Status: DISCONTINUED | OUTPATIENT
Start: 2022-01-01 | End: 2022-01-01

## 2022-01-01 RX ORDER — SODIUM CHLORIDE 0.9 % (FLUSH) 0.9 %
5-40 SYRINGE (ML) INJECTION PRN
Status: DISCONTINUED | OUTPATIENT
Start: 2022-01-01 | End: 2022-01-01 | Stop reason: HOSPADM

## 2022-01-01 RX ORDER — ALBUTEROL SULFATE 2.5 MG/3ML
2.5 SOLUTION RESPIRATORY (INHALATION) EVERY 6 HOURS PRN
Status: DISCONTINUED | OUTPATIENT
Start: 2022-01-01 | End: 2022-01-01 | Stop reason: HOSPADM

## 2022-01-01 RX ORDER — HEPARIN SODIUM 1000 [USP'U]/ML
2000 INJECTION, SOLUTION INTRAVENOUS; SUBCUTANEOUS PRN
Status: DISCONTINUED | OUTPATIENT
Start: 2022-01-01 | End: 2022-01-01

## 2022-01-01 RX ORDER — CYCLOSPORINE 0.5 MG/ML
1 EMULSION OPHTHALMIC NIGHTLY
Status: DISCONTINUED | OUTPATIENT
Start: 2022-01-01 | End: 2022-01-01 | Stop reason: HOSPADM

## 2022-01-01 RX ORDER — DICYCLOMINE HYDROCHLORIDE 10 MG/ML
20 INJECTION INTRAMUSCULAR 4 TIMES DAILY
Status: DISCONTINUED | OUTPATIENT
Start: 2022-01-01 | End: 2022-01-01

## 2022-01-01 RX ORDER — FLASH GLUCOSE SENSOR
KIT MISCELLANEOUS
Qty: 2 EACH | Refills: 6 | Status: ON HOLD | OUTPATIENT
Start: 2022-01-01 | End: 2022-01-01 | Stop reason: HOSPADM

## 2022-01-01 RX ORDER — POLYETHYLENE GLYCOL 3350 17 G/17G
17 POWDER, FOR SOLUTION ORAL DAILY PRN
Status: DISCONTINUED | OUTPATIENT
Start: 2022-01-01 | End: 2022-01-01 | Stop reason: HOSPADM

## 2022-01-01 RX ORDER — ALBUTEROL SULFATE 90 UG/1
2 AEROSOL, METERED RESPIRATORY (INHALATION) PRN
Qty: 1 EACH | Refills: 3 | Status: SHIPPED | OUTPATIENT
Start: 2022-01-01

## 2022-01-01 RX ORDER — HEPARIN SODIUM 1000 [USP'U]/ML
4000 INJECTION, SOLUTION INTRAVENOUS; SUBCUTANEOUS PRN
Status: DISCONTINUED | OUTPATIENT
Start: 2022-01-01 | End: 2022-01-01

## 2022-01-01 RX ORDER — ACETAMINOPHEN 325 MG/1
650 TABLET ORAL EVERY 6 HOURS PRN
Status: DISCONTINUED | OUTPATIENT
Start: 2022-01-01 | End: 2022-01-01 | Stop reason: HOSPADM

## 2022-01-01 RX ORDER — POTASSIUM CHLORIDE 20 MEQ/1
20 TABLET, EXTENDED RELEASE ORAL DAILY
Qty: 60 TABLET | Refills: 3 | Status: SHIPPED | OUTPATIENT
Start: 2022-01-01

## 2022-01-01 RX ORDER — ONDANSETRON 2 MG/ML
INJECTION INTRAMUSCULAR; INTRAVENOUS PRN
Status: DISCONTINUED | OUTPATIENT
Start: 2022-01-01 | End: 2022-01-01 | Stop reason: SDUPTHER

## 2022-01-01 RX ORDER — NITROGLYCERIN 0.4 MG/1
0.4 TABLET SUBLINGUAL EVERY 5 MIN PRN
Status: DISCONTINUED | OUTPATIENT
Start: 2022-01-01 | End: 2022-01-01 | Stop reason: HOSPADM

## 2022-01-01 RX ORDER — FUROSEMIDE 10 MG/ML
60 INJECTION INTRAMUSCULAR; INTRAVENOUS ONCE
Status: DISCONTINUED | OUTPATIENT
Start: 2022-01-01 | End: 2022-01-01

## 2022-01-01 RX ORDER — OXYBUTYNIN CHLORIDE 5 MG/1
TABLET, EXTENDED RELEASE ORAL
Qty: 90 TABLET | Refills: 3 | Status: SHIPPED | OUTPATIENT
Start: 2022-01-01

## 2022-01-01 RX ORDER — DOPAMINE HYDROCHLORIDE 160 MG/100ML
INJECTION, SOLUTION INTRAVENOUS
Status: DISPENSED
Start: 2022-01-01 | End: 2022-01-01

## 2022-01-01 RX ORDER — SODIUM CHLORIDE, SODIUM LACTATE, POTASSIUM CHLORIDE, AND CALCIUM CHLORIDE .6; .31; .03; .02 G/100ML; G/100ML; G/100ML; G/100ML
500 INJECTION, SOLUTION INTRAVENOUS ONCE
Status: COMPLETED | OUTPATIENT
Start: 2022-01-01 | End: 2022-01-01

## 2022-01-01 RX ORDER — DICYCLOMINE HYDROCHLORIDE 10 MG/ML
20 INJECTION INTRAMUSCULAR ONCE
Status: COMPLETED | OUTPATIENT
Start: 2022-01-01 | End: 2022-01-01

## 2022-01-01 RX ORDER — SODIUM CHLORIDE 9 MG/ML
INJECTION, SOLUTION INTRAVENOUS CONTINUOUS
Status: DISCONTINUED | OUTPATIENT
Start: 2022-01-01 | End: 2022-01-01

## 2022-01-01 RX ORDER — DEXTROSE MONOHYDRATE 50 MG/ML
100 INJECTION, SOLUTION INTRAVENOUS PRN
Status: DISCONTINUED | OUTPATIENT
Start: 2022-01-01 | End: 2022-01-01 | Stop reason: HOSPADM

## 2022-01-01 RX ORDER — BUPIVACAINE HYDROCHLORIDE 2.5 MG/ML
INJECTION, SOLUTION EPIDURAL; INFILTRATION; INTRACAUDAL PRN
Status: DISCONTINUED | OUTPATIENT
Start: 2022-01-01 | End: 2022-01-01 | Stop reason: SDUPTHER

## 2022-01-01 RX ORDER — SENNA PLUS 8.6 MG/1
1 TABLET ORAL DAILY PRN
Status: DISCONTINUED | OUTPATIENT
Start: 2022-01-01 | End: 2022-01-01 | Stop reason: HOSPADM

## 2022-01-01 RX ORDER — BUSPIRONE HYDROCHLORIDE 10 MG/1
10 TABLET ORAL 2 TIMES DAILY
Status: DISCONTINUED | OUTPATIENT
Start: 2022-01-01 | End: 2022-01-01 | Stop reason: HOSPADM

## 2022-01-01 RX ORDER — ROCURONIUM BROMIDE 10 MG/ML
INJECTION, SOLUTION INTRAVENOUS PRN
Status: DISCONTINUED | OUTPATIENT
Start: 2022-01-01 | End: 2022-01-01 | Stop reason: SDUPTHER

## 2022-01-01 RX ORDER — CARVEDILOL 6.25 MG/1
6.25 TABLET ORAL 2 TIMES DAILY WITH MEALS
Status: DISCONTINUED | OUTPATIENT
Start: 2022-01-01 | End: 2022-01-01 | Stop reason: HOSPADM

## 2022-01-01 RX ORDER — LACTULOSE 10 G/15ML
30 SOLUTION ORAL 3 TIMES DAILY
Status: DISCONTINUED | OUTPATIENT
Start: 2022-01-01 | End: 2022-01-01

## 2022-01-01 RX ORDER — ONDANSETRON 2 MG/ML
4 INJECTION INTRAMUSCULAR; INTRAVENOUS
Status: ACTIVE | OUTPATIENT
Start: 2022-01-01 | End: 2022-01-01

## 2022-01-01 RX ORDER — ONDANSETRON 4 MG/1
4 TABLET, ORALLY DISINTEGRATING ORAL 3 TIMES DAILY PRN
Qty: 21 TABLET | Refills: 0 | Status: SHIPPED | OUTPATIENT
Start: 2022-01-01

## 2022-01-01 RX ORDER — ACETAMINOPHEN 650 MG/1
650 SUPPOSITORY RECTAL EVERY 6 HOURS PRN
Status: DISCONTINUED | OUTPATIENT
Start: 2022-01-01 | End: 2022-01-01

## 2022-01-01 RX ORDER — CALCIUM CHLORIDE 100 MG/ML
INJECTION INTRAVENOUS; INTRAVENTRICULAR
Status: COMPLETED | OUTPATIENT
Start: 2022-01-01 | End: 2022-01-01

## 2022-01-01 RX ORDER — FENTANYL CITRATE 50 UG/ML
50 INJECTION, SOLUTION INTRAMUSCULAR; INTRAVENOUS ONCE
Status: DISCONTINUED | OUTPATIENT
Start: 2022-01-01 | End: 2022-01-01

## 2022-01-01 RX ORDER — OXYCODONE HYDROCHLORIDE AND ACETAMINOPHEN 5; 325 MG/1; MG/1
1 TABLET ORAL EVERY 4 HOURS PRN
Status: DISCONTINUED | OUTPATIENT
Start: 2022-01-01 | End: 2022-01-01 | Stop reason: SDUPTHER

## 2022-01-01 RX ORDER — INSULIN LISPRO 100 [IU]/ML
0-3 INJECTION, SOLUTION INTRAVENOUS; SUBCUTANEOUS NIGHTLY
Status: DISCONTINUED | OUTPATIENT
Start: 2022-01-01 | End: 2022-01-01

## 2022-01-01 RX ORDER — FUROSEMIDE 40 MG/1
40 TABLET ORAL DAILY
Status: DISCONTINUED | OUTPATIENT
Start: 2022-01-01 | End: 2022-01-01 | Stop reason: HOSPADM

## 2022-01-01 RX ORDER — ALBUTEROL SULFATE 2.5 MG/3ML
2.5 SOLUTION RESPIRATORY (INHALATION) EVERY 6 HOURS PRN
Qty: 120 EACH | Refills: 3 | Status: SHIPPED | OUTPATIENT
Start: 2022-01-01 | End: 2022-01-01

## 2022-01-01 RX ORDER — INSULIN GLARGINE 100 [IU]/ML
INJECTION, SOLUTION SUBCUTANEOUS
Qty: 15 PEN | Refills: 3 | Status: SHIPPED | OUTPATIENT
Start: 2022-01-01

## 2022-01-01 RX ORDER — FUROSEMIDE 10 MG/ML
80 INJECTION INTRAMUSCULAR; INTRAVENOUS ONCE
Status: COMPLETED | OUTPATIENT
Start: 2022-01-01 | End: 2022-01-01

## 2022-01-01 RX ORDER — FUROSEMIDE 20 MG/1
20 TABLET ORAL
COMMUNITY

## 2022-01-01 RX ORDER — MORPHINE SULFATE 4 MG/ML
4 INJECTION, SOLUTION INTRAMUSCULAR; INTRAVENOUS
Status: DISCONTINUED | OUTPATIENT
Start: 2022-01-01 | End: 2022-01-01 | Stop reason: HOSPADM

## 2022-01-01 RX ORDER — CHLORHEXIDINE GLUCONATE 0.12 MG/ML
15 RINSE ORAL 2 TIMES DAILY
Status: DISCONTINUED | OUTPATIENT
Start: 2022-01-01 | End: 2022-01-01

## 2022-01-01 RX ORDER — PRAZOSIN HYDROCHLORIDE 2 MG/1
2 CAPSULE ORAL NIGHTLY
Status: DISCONTINUED | OUTPATIENT
Start: 2022-01-01 | End: 2022-01-01 | Stop reason: HOSPADM

## 2022-01-01 RX ORDER — INSULIN LISPRO 100 [IU]/ML
0-6 INJECTION, SOLUTION INTRAVENOUS; SUBCUTANEOUS EVERY 4 HOURS
Status: DISCONTINUED | OUTPATIENT
Start: 2022-01-01 | End: 2022-01-01

## 2022-01-01 RX ORDER — NYSTATIN 100000 U/G
CREAM TOPICAL 2 TIMES DAILY
Status: DISCONTINUED | OUTPATIENT
Start: 2022-01-01 | End: 2022-01-01

## 2022-01-01 RX ORDER — SODIUM PHOSPHATE, DIBASIC AND SODIUM PHOSPHATE, MONOBASIC 7; 19 G/133ML; G/133ML
1 ENEMA RECTAL
Status: COMPLETED | OUTPATIENT
Start: 2022-01-01 | End: 2022-01-01

## 2022-01-01 RX ORDER — FUROSEMIDE 10 MG/ML
60 INJECTION INTRAMUSCULAR; INTRAVENOUS 2 TIMES DAILY
Status: DISCONTINUED | OUTPATIENT
Start: 2022-01-01 | End: 2022-01-01

## 2022-01-01 RX ORDER — CALCIUM CHLORIDE 100 MG/ML
1000 INJECTION INTRAVENOUS; INTRAVENTRICULAR ONCE
Status: COMPLETED | OUTPATIENT
Start: 2022-01-01 | End: 2022-01-01

## 2022-01-01 RX ORDER — IRON ASPGLY,PS/C/B12/FA/CA/SUC 150-25-1
1 CAPSULE ORAL DAILY
COMMUNITY

## 2022-01-01 RX ORDER — MONTELUKAST SODIUM 10 MG/1
10 TABLET ORAL NIGHTLY
Qty: 30 TABLET | Refills: 3 | Status: SHIPPED | OUTPATIENT
Start: 2022-01-01

## 2022-01-01 RX ORDER — SODIUM CHLORIDE 9 MG/ML
250 INJECTION, SOLUTION INTRAVENOUS ONCE
Status: COMPLETED | OUTPATIENT
Start: 2022-01-01 | End: 2022-01-01

## 2022-01-01 RX ORDER — SERTRALINE HYDROCHLORIDE 100 MG/1
100 TABLET, FILM COATED ORAL DAILY
Status: DISCONTINUED | OUTPATIENT
Start: 2022-01-01 | End: 2022-01-01 | Stop reason: HOSPADM

## 2022-01-01 RX ORDER — DEXTROSE MONOHYDRATE 25 G/50ML
12.5 INJECTION, SOLUTION INTRAVENOUS ONCE
Status: DISCONTINUED | OUTPATIENT
Start: 2022-01-01 | End: 2022-01-01 | Stop reason: RX

## 2022-01-01 RX ORDER — OXYCODONE HYDROCHLORIDE 5 MG/1
5 TABLET ORAL PRN
Status: DISCONTINUED | OUTPATIENT
Start: 2022-01-01 | End: 2022-01-01 | Stop reason: HOSPADM

## 2022-01-01 RX ORDER — ONDANSETRON 4 MG/1
4 TABLET, ORALLY DISINTEGRATING ORAL EVERY 8 HOURS PRN
Qty: 20 TABLET | Refills: 0 | Status: SHIPPED | OUTPATIENT
Start: 2022-01-01 | End: 2022-01-01

## 2022-01-01 RX ORDER — ROCURONIUM BROMIDE 10 MG/ML
1 INJECTION, SOLUTION INTRAVENOUS ONCE
Status: DISCONTINUED | OUTPATIENT
Start: 2022-01-01 | End: 2022-01-01 | Stop reason: HOSPADM

## 2022-01-01 RX ORDER — WOUND DRESSING ADHESIVE - LIQUID
LIQUID MISCELLANEOUS PRN
Status: DISCONTINUED | OUTPATIENT
Start: 2022-01-01 | End: 2022-01-01 | Stop reason: HOSPADM

## 2022-01-01 RX ORDER — ALBUTEROL SULFATE 2.5 MG/3ML
2.5 SOLUTION RESPIRATORY (INHALATION) EVERY 6 HOURS PRN
Status: DISCONTINUED | OUTPATIENT
Start: 2022-01-01 | End: 2022-01-01

## 2022-01-01 RX ORDER — PRAZOSIN HYDROCHLORIDE 2 MG/1
2 CAPSULE ORAL NIGHTLY
Status: DISCONTINUED | OUTPATIENT
Start: 2022-01-01 | End: 2022-01-01

## 2022-01-01 RX ORDER — SERTRALINE HYDROCHLORIDE 100 MG/1
200 TABLET, FILM COATED ORAL DAILY
Status: DISCONTINUED | OUTPATIENT
Start: 2022-01-01 | End: 2022-01-01 | Stop reason: HOSPADM

## 2022-01-01 RX ORDER — LIDOCAINE HYDROCHLORIDE 20 MG/ML
5 INJECTION, SOLUTION INFILTRATION; PERINEURAL ONCE
Status: COMPLETED | OUTPATIENT
Start: 2022-01-01 | End: 2022-01-01

## 2022-01-01 RX ORDER — CARVEDILOL 6.25 MG/1
6.25 TABLET ORAL 2 TIMES DAILY WITH MEALS
Status: DISCONTINUED | OUTPATIENT
Start: 2022-01-01 | End: 2022-01-01

## 2022-01-01 RX ORDER — DEXTROSE MONOHYDRATE 25 G/50ML
12.5 INJECTION, SOLUTION INTRAVENOUS PRN
Status: DISCONTINUED | OUTPATIENT
Start: 2022-01-01 | End: 2022-01-01 | Stop reason: ALTCHOICE

## 2022-01-01 RX ORDER — ENOXAPARIN SODIUM 100 MG/ML
40 INJECTION SUBCUTANEOUS DAILY
Status: DISCONTINUED | OUTPATIENT
Start: 2022-01-01 | End: 2022-01-01

## 2022-01-01 RX ORDER — DEXAMETHASONE SODIUM PHOSPHATE 4 MG/ML
INJECTION, SOLUTION INTRA-ARTICULAR; INTRALESIONAL; INTRAMUSCULAR; INTRAVENOUS; SOFT TISSUE PRN
Status: DISCONTINUED | OUTPATIENT
Start: 2022-01-01 | End: 2022-01-01 | Stop reason: SDUPTHER

## 2022-01-01 RX ORDER — OXYCODONE HYDROCHLORIDE AND ACETAMINOPHEN 5; 325 MG/1; MG/1
2 TABLET ORAL EVERY 4 HOURS PRN
Status: DISCONTINUED | OUTPATIENT
Start: 2022-01-01 | End: 2022-01-01

## 2022-01-01 RX ORDER — DICYCLOMINE HYDROCHLORIDE 10 MG/5ML
20 SOLUTION ORAL
Status: DISCONTINUED | OUTPATIENT
Start: 2022-01-01 | End: 2022-01-01

## 2022-01-01 RX ORDER — ETOMIDATE 2 MG/ML
40 INJECTION INTRAVENOUS ONCE
Status: DISCONTINUED | OUTPATIENT
Start: 2022-01-01 | End: 2022-01-01 | Stop reason: DRUGHIGH

## 2022-01-01 RX ORDER — OXYCODONE HYDROCHLORIDE AND ACETAMINOPHEN 5; 325 MG/1; MG/1
1 TABLET ORAL EVERY 6 HOURS PRN
Qty: 12 TABLET | Refills: 0 | Status: SHIPPED | OUTPATIENT
Start: 2022-01-01 | End: 2022-01-01

## 2022-01-01 RX ORDER — DOXEPIN HYDROCHLORIDE 25 MG/1
25 CAPSULE ORAL NIGHTLY
Status: DISCONTINUED | OUTPATIENT
Start: 2022-01-01 | End: 2022-01-01 | Stop reason: HOSPADM

## 2022-01-01 RX ORDER — ENOXAPARIN SODIUM 100 MG/ML
30 INJECTION SUBCUTANEOUS 2 TIMES DAILY
Status: DISCONTINUED | OUTPATIENT
Start: 2022-01-01 | End: 2022-01-01

## 2022-01-01 RX ORDER — HYDROXYZINE PAMOATE 50 MG/1
50 CAPSULE ORAL 3 TIMES DAILY PRN
Status: DISCONTINUED | OUTPATIENT
Start: 2022-01-01 | End: 2022-01-01

## 2022-01-01 RX ORDER — MAGNESIUM HYDROXIDE 1200 MG/15ML
LIQUID ORAL CONTINUOUS PRN
Status: DISCONTINUED | OUTPATIENT
Start: 2022-01-01 | End: 2022-01-01 | Stop reason: HOSPADM

## 2022-01-01 RX ORDER — HYDRALAZINE HYDROCHLORIDE 50 MG/1
50 TABLET, FILM COATED ORAL EVERY 8 HOURS SCHEDULED
Qty: 90 TABLET | Refills: 3 | Status: SHIPPED | OUTPATIENT
Start: 2022-01-01

## 2022-01-01 RX ORDER — SODIUM CHLORIDE, SODIUM LACTATE, POTASSIUM CHLORIDE, CALCIUM CHLORIDE 600; 310; 30; 20 MG/100ML; MG/100ML; MG/100ML; MG/100ML
INJECTION, SOLUTION INTRAVENOUS
Status: DISPENSED
Start: 2022-01-01 | End: 2022-01-01

## 2022-01-01 RX ORDER — DEXTROSE MONOHYDRATE 25 G/50ML
12.5 INJECTION, SOLUTION INTRAVENOUS PRN
Status: DISCONTINUED | OUTPATIENT
Start: 2022-01-01 | End: 2022-01-01

## 2022-01-01 RX ORDER — ATROPINE SULFATE 0.1 MG/ML
INJECTION INTRAVENOUS
Status: COMPLETED
Start: 2022-01-01 | End: 2022-01-01

## 2022-01-01 RX ORDER — 0.9 % SODIUM CHLORIDE 0.9 %
500 INTRAVENOUS SOLUTION INTRAVENOUS ONCE
Status: COMPLETED | OUTPATIENT
Start: 2022-01-01 | End: 2022-01-01

## 2022-01-01 RX ORDER — SODIUM CHLORIDE 0.9 % (FLUSH) 0.9 %
10 SYRINGE (ML) INJECTION
Status: DISCONTINUED | OUTPATIENT
Start: 2022-01-01 | End: 2022-01-01 | Stop reason: HOSPADM

## 2022-01-01 RX ORDER — FUROSEMIDE 40 MG/1
40 TABLET ORAL DAILY
Status: DISCONTINUED | OUTPATIENT
Start: 2022-01-01 | End: 2022-01-01

## 2022-01-01 RX ORDER — CARVEDILOL 25 MG/1
25 TABLET ORAL 2 TIMES DAILY WITH MEALS
Qty: 60 TABLET | Refills: 3 | Status: SHIPPED | OUTPATIENT
Start: 2022-01-01 | End: 2022-01-01

## 2022-01-01 RX ORDER — TAMSULOSIN HYDROCHLORIDE 0.4 MG/1
0.4 CAPSULE ORAL
Status: DISCONTINUED | OUTPATIENT
Start: 2022-01-01 | End: 2022-01-01

## 2022-01-01 RX ORDER — LIDOCAINE HYDROCHLORIDE 20 MG/ML
INJECTION, SOLUTION EPIDURAL; INFILTRATION; INTRACAUDAL; PERINEURAL PRN
Status: DISCONTINUED | OUTPATIENT
Start: 2022-01-01 | End: 2022-01-01 | Stop reason: SDUPTHER

## 2022-01-01 RX ORDER — FUROSEMIDE 40 MG/1
40 TABLET ORAL EVERY MORNING
COMMUNITY

## 2022-01-01 RX ORDER — ACETAMINOPHEN 650 MG/1
650 SUPPOSITORY RECTAL EVERY 6 HOURS PRN
Status: DISCONTINUED | OUTPATIENT
Start: 2022-01-01 | End: 2022-01-01 | Stop reason: HOSPADM

## 2022-01-01 RX ORDER — TRAMADOL HYDROCHLORIDE 50 MG/1
50 TABLET ORAL EVERY 6 HOURS PRN
Status: DISCONTINUED | OUTPATIENT
Start: 2022-01-01 | End: 2022-01-01 | Stop reason: HOSPADM

## 2022-01-01 RX ORDER — ETOMIDATE 2 MG/ML
INJECTION INTRAVENOUS
Status: DISPENSED
Start: 2022-01-01 | End: 2022-01-01

## 2022-01-01 RX ORDER — INSULIN GLARGINE 100 [IU]/ML
40 INJECTION, SOLUTION SUBCUTANEOUS NIGHTLY
Status: DISCONTINUED | OUTPATIENT
Start: 2022-01-01 | End: 2022-01-01 | Stop reason: HOSPADM

## 2022-01-01 RX ORDER — BISACODYL 10 MG
10 SUPPOSITORY, RECTAL RECTAL PRN
Status: DISCONTINUED | OUTPATIENT
Start: 2022-01-01 | End: 2022-01-01 | Stop reason: HOSPADM

## 2022-01-01 RX ORDER — DEXTROSE MONOHYDRATE 25 G/50ML
25 INJECTION, SOLUTION INTRAVENOUS ONCE
Status: COMPLETED | OUTPATIENT
Start: 2022-01-01 | End: 2022-01-01

## 2022-01-01 RX ORDER — ONDANSETRON 2 MG/ML
4 INJECTION INTRAMUSCULAR; INTRAVENOUS EVERY 6 HOURS PRN
Status: DISCONTINUED | OUTPATIENT
Start: 2022-01-01 | End: 2022-01-01 | Stop reason: HOSPADM

## 2022-01-01 RX ORDER — INSULIN LISPRO 100 [IU]/ML
0-12 INJECTION, SOLUTION INTRAVENOUS; SUBCUTANEOUS
Status: DISCONTINUED | OUTPATIENT
Start: 2022-01-01 | End: 2022-01-01 | Stop reason: HOSPADM

## 2022-01-01 RX ORDER — SENNA AND DOCUSATE SODIUM 50; 8.6 MG/1; MG/1
2 TABLET, FILM COATED ORAL 2 TIMES DAILY
Status: DISCONTINUED | OUTPATIENT
Start: 2022-01-01 | End: 2022-01-01 | Stop reason: HOSPADM

## 2022-01-01 RX ORDER — DICYCLOMINE HYDROCHLORIDE 10 MG/1
10 CAPSULE ORAL EVERY 6 HOURS PRN
Qty: 20 CAPSULE | Refills: 0 | Status: SHIPPED | OUTPATIENT
Start: 2022-01-01

## 2022-01-01 RX ORDER — DEXTROSE MONOHYDRATE 25 G/50ML
INJECTION, SOLUTION INTRAVENOUS
Status: COMPLETED
Start: 2022-01-01 | End: 2022-01-01

## 2022-01-01 RX ORDER — PROPOFOL 10 MG/ML
INJECTION, EMULSION INTRAVENOUS PRN
Status: DISCONTINUED | OUTPATIENT
Start: 2022-01-01 | End: 2022-01-01 | Stop reason: SDUPTHER

## 2022-01-01 RX ORDER — ISOSORBIDE DINITRATE 10 MG/1
20 TABLET ORAL 3 TIMES DAILY
Status: DISCONTINUED | OUTPATIENT
Start: 2022-01-01 | End: 2022-01-01 | Stop reason: HOSPADM

## 2022-01-01 RX ORDER — DOPAMINE HYDROCHLORIDE 160 MG/100ML
1-20 INJECTION, SOLUTION INTRAVENOUS CONTINUOUS
Status: DISCONTINUED | OUTPATIENT
Start: 2022-01-01 | End: 2022-01-01 | Stop reason: ALTCHOICE

## 2022-01-01 RX ORDER — INSULIN LISPRO 100 [IU]/ML
INJECTION, SOLUTION INTRAVENOUS; SUBCUTANEOUS
Qty: 10 PEN | Refills: 3 | Status: SHIPPED | OUTPATIENT
Start: 2022-01-01

## 2022-01-01 RX ORDER — OXYCODONE HYDROCHLORIDE 5 MG/1
10 TABLET ORAL PRN
Status: DISCONTINUED | OUTPATIENT
Start: 2022-01-01 | End: 2022-01-01 | Stop reason: HOSPADM

## 2022-01-01 RX ORDER — FUROSEMIDE 10 MG/ML
40 INJECTION INTRAMUSCULAR; INTRAVENOUS 2 TIMES DAILY
Status: DISCONTINUED | OUTPATIENT
Start: 2022-01-01 | End: 2022-01-01

## 2022-01-01 RX ORDER — MIDAZOLAM HYDROCHLORIDE 2 MG/2ML
4 INJECTION, SOLUTION INTRAMUSCULAR; INTRAVENOUS ONCE
Status: COMPLETED | OUTPATIENT
Start: 2022-01-01 | End: 2022-01-01

## 2022-01-01 RX ORDER — FUROSEMIDE 10 MG/ML
40 INJECTION INTRAMUSCULAR; INTRAVENOUS ONCE
Status: COMPLETED | OUTPATIENT
Start: 2022-01-01 | End: 2022-01-01

## 2022-01-01 RX ORDER — HYDRALAZINE HYDROCHLORIDE 50 MG/1
50 TABLET, FILM COATED ORAL EVERY 8 HOURS SCHEDULED
Status: DISCONTINUED | OUTPATIENT
Start: 2022-01-01 | End: 2022-01-01

## 2022-01-01 RX ORDER — ENOXAPARIN SODIUM 100 MG/ML
30 INJECTION SUBCUTANEOUS DAILY
Status: DISCONTINUED | OUTPATIENT
Start: 2022-01-01 | End: 2022-01-01

## 2022-01-01 RX ORDER — DICYCLOMINE HYDROCHLORIDE 10 MG/1
10 CAPSULE ORAL EVERY 6 HOURS PRN
Status: DISCONTINUED | OUTPATIENT
Start: 2022-01-01 | End: 2022-01-01

## 2022-01-01 RX ORDER — POTASSIUM CHLORIDE 29.8 MG/ML
20 INJECTION INTRAVENOUS
Status: COMPLETED | OUTPATIENT
Start: 2022-01-01 | End: 2022-01-01

## 2022-01-01 RX ORDER — SODIUM CHLORIDE 9 MG/ML
INJECTION, SOLUTION INTRAVENOUS CONTINUOUS
Status: DISCONTINUED | OUTPATIENT
Start: 2022-01-01 | End: 2022-01-01 | Stop reason: HOSPADM

## 2022-01-01 RX ORDER — CIPROFLOXACIN 500 MG/1
500 TABLET, FILM COATED ORAL 2 TIMES DAILY
Qty: 20 TABLET | Refills: 0 | Status: SHIPPED | OUTPATIENT
Start: 2022-01-01 | End: 2022-05-27

## 2022-01-01 RX ORDER — DOXEPIN HYDROCHLORIDE 75 MG/1
150 CAPSULE ORAL NIGHTLY
Status: DISCONTINUED | OUTPATIENT
Start: 2022-01-01 | End: 2022-01-01

## 2022-01-01 RX ORDER — POLYVINYL ALCOHOL 14 MG/ML
1 SOLUTION/ DROPS OPHTHALMIC 2 TIMES DAILY
Status: DISCONTINUED | OUTPATIENT
Start: 2022-01-01 | End: 2022-01-01 | Stop reason: HOSPADM

## 2022-01-01 RX ORDER — INSULIN LISPRO 100 [IU]/ML
0-6 INJECTION, SOLUTION INTRAVENOUS; SUBCUTANEOUS NIGHTLY
Status: DISCONTINUED | OUTPATIENT
Start: 2022-01-01 | End: 2022-01-01 | Stop reason: HOSPADM

## 2022-01-01 RX ORDER — MEPERIDINE HYDROCHLORIDE 25 MG/ML
12.5 INJECTION INTRAMUSCULAR; INTRAVENOUS; SUBCUTANEOUS
Status: ACTIVE | OUTPATIENT
Start: 2022-01-01 | End: 2022-01-01

## 2022-01-01 RX ORDER — DIPHENHYDRAMINE HYDROCHLORIDE 50 MG/ML
12.5 INJECTION INTRAMUSCULAR; INTRAVENOUS
Status: ACTIVE | OUTPATIENT
Start: 2022-01-01 | End: 2022-01-01

## 2022-01-01 RX ORDER — DEXTROSE MONOHYDRATE 25 G/50ML
25 INJECTION, SOLUTION INTRAVENOUS ONCE
Status: DISCONTINUED | OUTPATIENT
Start: 2022-01-01 | End: 2022-01-01 | Stop reason: RX

## 2022-01-01 RX ORDER — ATROPINE SULFATE 0.1 MG/ML
1 INJECTION INTRAVENOUS PRN
Status: DISCONTINUED | OUTPATIENT
Start: 2022-01-01 | End: 2022-01-01 | Stop reason: HOSPADM

## 2022-01-01 RX ADMIN — ISOSORBIDE DINITRATE 20 MG: 10 TABLET ORAL at 21:14

## 2022-01-01 RX ADMIN — INSULIN LISPRO 1 UNITS: 100 INJECTION, SOLUTION INTRAVENOUS; SUBCUTANEOUS at 23:20

## 2022-01-01 RX ADMIN — INSULIN LISPRO 1 UNITS: 100 INJECTION, SOLUTION INTRAVENOUS; SUBCUTANEOUS at 21:15

## 2022-01-01 RX ADMIN — HYDROMORPHONE HYDROCHLORIDE 0.5 MG: 1 INJECTION, SOLUTION INTRAMUSCULAR; INTRAVENOUS; SUBCUTANEOUS at 21:54

## 2022-01-01 RX ADMIN — HEPARIN SODIUM AND DEXTROSE 7.5 UNITS/KG/HR: 10000; 5 INJECTION INTRAVENOUS at 19:58

## 2022-01-01 RX ADMIN — SODIUM PHOSPHATE, DIBASIC AND SODIUM PHOSPHATE, MONOBASIC 1 ENEMA: 7; 19 ENEMA RECTAL at 11:21

## 2022-01-01 RX ADMIN — HEPARIN SODIUM AND DEXTROSE 10 UNITS/KG/HR: 10000; 5 INJECTION INTRAVENOUS at 11:43

## 2022-01-01 RX ADMIN — LEVOTHYROXINE SODIUM 50 MCG: 0.05 TABLET ORAL at 06:08

## 2022-01-01 RX ADMIN — SODIUM CHLORIDE 500 ML: 9 INJECTION, SOLUTION INTRAVENOUS at 09:01

## 2022-01-01 RX ADMIN — EPINEPHRINE 1 MG: 0.1 INJECTION, SOLUTION ENDOTRACHEAL; INTRACARDIAC; INTRAVENOUS at 06:09

## 2022-01-01 RX ADMIN — SODIUM CHLORIDE, PRESERVATIVE FREE 40 MG: 5 INJECTION INTRAVENOUS at 08:39

## 2022-01-01 RX ADMIN — VANCOMYCIN HYDROCHLORIDE 1250 MG: 1.25 INJECTION, POWDER, LYOPHILIZED, FOR SOLUTION INTRAVENOUS at 17:19

## 2022-01-01 RX ADMIN — SENNOSIDES AND DOCUSATE SODIUM 2 TABLET: 50; 8.6 TABLET ORAL at 08:38

## 2022-01-01 RX ADMIN — PIPERACILLIN AND TAZOBACTAM 3375 MG: 3; .375 INJECTION, POWDER, LYOPHILIZED, FOR SOLUTION INTRAVENOUS at 21:45

## 2022-01-01 RX ADMIN — KETOROLAC TROMETHAMINE 30 MG: 30 INJECTION, SOLUTION INTRAMUSCULAR; INTRAVENOUS at 13:11

## 2022-01-01 RX ADMIN — SODIUM CHLORIDE 1000 ML: 9 INJECTION, SOLUTION INTRAVENOUS at 00:48

## 2022-01-01 RX ADMIN — ISOSORBIDE DINITRATE 20 MG: 20 TABLET ORAL at 15:29

## 2022-01-01 RX ADMIN — VANCOMYCIN HYDROCHLORIDE 750 MG: 750 INJECTION, POWDER, LYOPHILIZED, FOR SOLUTION INTRAVENOUS at 20:36

## 2022-01-01 RX ADMIN — SODIUM BICARBONATE 50 MEQ: 84 INJECTION, SOLUTION INTRAVENOUS at 06:27

## 2022-01-01 RX ADMIN — MONTELUKAST 10 MG: 10 TABLET, FILM COATED ORAL at 21:08

## 2022-01-01 RX ADMIN — INSULIN HUMAN 10 UNITS: 100 INJECTION, SOLUTION PARENTERAL at 22:44

## 2022-01-01 RX ADMIN — RANOLAZINE 1000 MG: 500 TABLET, FILM COATED, EXTENDED RELEASE ORAL at 23:05

## 2022-01-01 RX ADMIN — BUPIVACAINE HYDROCHLORIDE 30 ML: 2.5 INJECTION, SOLUTION EPIDURAL; INFILTRATION; INTRACAUDAL; PERINEURAL at 12:54

## 2022-01-01 RX ADMIN — METOPROLOL SUCCINATE 50 MG: 50 TABLET, EXTENDED RELEASE ORAL at 08:15

## 2022-01-01 RX ADMIN — SODIUM CHLORIDE, PRESERVATIVE FREE 40 MG: 5 INJECTION INTRAVENOUS at 09:13

## 2022-01-01 RX ADMIN — SODIUM CHLORIDE: 9 INJECTION, SOLUTION INTRAVENOUS at 20:44

## 2022-01-01 RX ADMIN — Medication 10 MG: at 13:27

## 2022-01-01 RX ADMIN — ENOXAPARIN SODIUM 30 MG: 100 INJECTION SUBCUTANEOUS at 08:43

## 2022-01-01 RX ADMIN — DEXTROSE MONOHYDRATE 25 G: 25 INJECTION, SOLUTION INTRAVENOUS at 16:34

## 2022-01-01 RX ADMIN — MONTELUKAST 10 MG: 10 TABLET, FILM COATED ORAL at 20:26

## 2022-01-01 RX ADMIN — MONTELUKAST 10 MG: 10 TABLET, FILM COATED ORAL at 21:14

## 2022-01-01 RX ADMIN — MEROPENEM 1000 MG: 1 INJECTION INTRAVENOUS at 05:04

## 2022-01-01 RX ADMIN — EPINEPHRINE 1 MG: 0.1 INJECTION, SOLUTION ENDOTRACHEAL; INTRACARDIAC; INTRAVENOUS at 06:23

## 2022-01-01 RX ADMIN — ENOXAPARIN SODIUM 30 MG: 100 INJECTION SUBCUTANEOUS at 21:08

## 2022-01-01 RX ADMIN — SODIUM CHLORIDE: 9 INJECTION, SOLUTION INTRAVENOUS at 06:46

## 2022-01-01 RX ADMIN — SENNOSIDES AND DOCUSATE SODIUM 2 TABLET: 50; 8.6 TABLET ORAL at 11:28

## 2022-01-01 RX ADMIN — VANCOMYCIN HYDROCHLORIDE 1000 MG: 1 INJECTION, POWDER, LYOPHILIZED, FOR SOLUTION INTRAVENOUS at 14:06

## 2022-01-01 RX ADMIN — RANOLAZINE 1000 MG: 500 TABLET, FILM COATED, EXTENDED RELEASE ORAL at 20:27

## 2022-01-01 RX ADMIN — METOPROLOL SUCCINATE 50 MG: 50 TABLET, EXTENDED RELEASE ORAL at 20:27

## 2022-01-01 RX ADMIN — MICONAZOLE NITRATE: 2 POWDER TOPICAL at 20:26

## 2022-01-01 RX ADMIN — SODIUM BICARBONATE: 84 INJECTION INTRAVENOUS at 10:09

## 2022-01-01 RX ADMIN — ACETAMINOPHEN 325MG 650 MG: 325 TABLET ORAL at 11:58

## 2022-01-01 RX ADMIN — SODIUM CHLORIDE 100 ML/HR: 9 INJECTION, SOLUTION INTRAVENOUS at 10:10

## 2022-01-01 RX ADMIN — ONDANSETRON 4 MG: 2 INJECTION INTRAMUSCULAR; INTRAVENOUS at 08:11

## 2022-01-01 RX ADMIN — LEVOTHYROXINE SODIUM 50 MCG: 0.05 TABLET ORAL at 06:50

## 2022-01-01 RX ADMIN — INSULIN GLARGINE 40 UNITS: 100 INJECTION, SOLUTION SUBCUTANEOUS at 21:27

## 2022-01-01 RX ADMIN — Medication 10 MG: at 14:27

## 2022-01-01 RX ADMIN — NOREPINEPHRINE BITARTRATE 10 MCG/MIN: 1 INJECTION, SOLUTION, CONCENTRATE INTRAVENOUS at 06:31

## 2022-01-01 RX ADMIN — POTASSIUM CHLORIDE 20 MEQ: 29.8 INJECTION, SOLUTION INTRAVENOUS at 08:57

## 2022-01-01 RX ADMIN — ISOSORBIDE DINITRATE 20 MG: 10 TABLET ORAL at 14:44

## 2022-01-01 RX ADMIN — DEXTROSE MONOHYDRATE 12.5 G: 25 INJECTION, SOLUTION INTRAVENOUS at 09:50

## 2022-01-01 RX ADMIN — BUSPIRONE HYDROCHLORIDE 10 MG: 10 TABLET ORAL at 08:15

## 2022-01-01 RX ADMIN — EPINEPHRINE 1 MG: 0.1 INJECTION, SOLUTION ENDOTRACHEAL; INTRACARDIAC; INTRAVENOUS at 06:40

## 2022-01-01 RX ADMIN — SODIUM CHLORIDE 1000 ML: 9 INJECTION, SOLUTION INTRAVENOUS at 14:22

## 2022-01-01 RX ADMIN — Medication 1 CAPSULE: at 09:42

## 2022-01-01 RX ADMIN — INSULIN LISPRO 2 UNITS: 100 INJECTION, SOLUTION INTRAVENOUS; SUBCUTANEOUS at 17:24

## 2022-01-01 RX ADMIN — SODIUM CHLORIDE 1000 ML: 9 INJECTION, SOLUTION INTRAVENOUS at 13:12

## 2022-01-01 RX ADMIN — LIDOCAINE HYDROCHLORIDE 80 MG: 20 INJECTION, SOLUTION EPIDURAL; INFILTRATION; INTRACAUDAL; PERINEURAL at 12:51

## 2022-01-01 RX ADMIN — PIPERACILLIN AND TAZOBACTAM 3375 MG: 3; .375 INJECTION, POWDER, LYOPHILIZED, FOR SOLUTION INTRAVENOUS at 14:29

## 2022-01-01 RX ADMIN — ONDANSETRON 4 MG: 2 INJECTION INTRAMUSCULAR; INTRAVENOUS at 06:49

## 2022-01-01 RX ADMIN — SODIUM CHLORIDE 250 ML: 9 INJECTION, SOLUTION INTRAVENOUS at 18:04

## 2022-01-01 RX ADMIN — VANCOMYCIN HYDROCHLORIDE 750 MG: 750 INJECTION, POWDER, LYOPHILIZED, FOR SOLUTION INTRAVENOUS at 19:53

## 2022-01-01 RX ADMIN — MORPHINE SULFATE 1 MG: 2 INJECTION, SOLUTION INTRAMUSCULAR; INTRAVENOUS at 05:38

## 2022-01-01 RX ADMIN — GABAPENTIN 600 MG: 300 CAPSULE ORAL at 21:45

## 2022-01-01 RX ADMIN — ONDANSETRON 4 MG: 2 INJECTION INTRAMUSCULAR; INTRAVENOUS at 11:59

## 2022-01-01 RX ADMIN — FUROSEMIDE 20 MG: 10 INJECTION, SOLUTION INTRAMUSCULAR; INTRAVENOUS at 22:40

## 2022-01-01 RX ADMIN — DICYCLOMINE HYDROCHLORIDE 20 MG: 20 INJECTION INTRAMUSCULAR at 21:21

## 2022-01-01 RX ADMIN — PRAZOSIN HYDROCHLORIDE 2 MG: 2 CAPSULE ORAL at 21:14

## 2022-01-01 RX ADMIN — Medication 75 MCG/MIN: at 13:28

## 2022-01-01 RX ADMIN — Medication 10 ML: at 20:03

## 2022-01-01 RX ADMIN — DICYCLOMINE HYDROCHLORIDE 20 MG: 20 INJECTION INTRAMUSCULAR at 21:38

## 2022-01-01 RX ADMIN — Medication 20 MCG/MIN: at 08:40

## 2022-01-01 RX ADMIN — FUROSEMIDE 40 MG: 10 INJECTION, SOLUTION INTRAMUSCULAR; INTRAVENOUS at 20:03

## 2022-01-01 RX ADMIN — DEXTROSE MONOHYDRATE 12.5 G: 25 INJECTION, SOLUTION INTRAVENOUS at 12:09

## 2022-01-01 RX ADMIN — MORPHINE SULFATE 1 MG: 2 INJECTION, SOLUTION INTRAMUSCULAR; INTRAVENOUS at 16:25

## 2022-01-01 RX ADMIN — 0.12% CHLORHEXIDINE GLUCONATE 15 ML: 1.2 RINSE ORAL at 10:26

## 2022-01-01 RX ADMIN — EPINEPHRINE 1 MG: 0.1 INJECTION, SOLUTION ENDOTRACHEAL; INTRACARDIAC; INTRAVENOUS at 06:37

## 2022-01-01 RX ADMIN — TICAGRELOR 90 MG: 90 TABLET ORAL at 09:33

## 2022-01-01 RX ADMIN — PRAZOSIN HYDROCHLORIDE 1 MG: 1 CAPSULE ORAL at 02:22

## 2022-01-01 RX ADMIN — INSULIN GLARGINE 40 UNITS: 100 INJECTION, SOLUTION SUBCUTANEOUS at 21:16

## 2022-01-01 RX ADMIN — METOPROLOL TARTRATE 5 MG: 1 INJECTION, SOLUTION INTRAVENOUS at 23:58

## 2022-01-01 RX ADMIN — METOPROLOL SUCCINATE 50 MG: 50 TABLET, EXTENDED RELEASE ORAL at 21:45

## 2022-01-01 RX ADMIN — FUROSEMIDE 40 MG: 10 INJECTION, SOLUTION INTRAMUSCULAR; INTRAVENOUS at 08:40

## 2022-01-01 RX ADMIN — METOPROLOL TARTRATE 5 MG: 1 INJECTION, SOLUTION INTRAVENOUS at 11:30

## 2022-01-01 RX ADMIN — EPINEPHRINE 1 MG: 0.1 INJECTION, SOLUTION ENDOTRACHEAL; INTRACARDIAC; INTRAVENOUS at 06:20

## 2022-01-01 RX ADMIN — PRAZOSIN HYDROCHLORIDE 2 MG: 2 CAPSULE ORAL at 20:26

## 2022-01-01 RX ADMIN — INSULIN LISPRO 1 UNITS: 100 INJECTION, SOLUTION INTRAVENOUS; SUBCUTANEOUS at 12:48

## 2022-01-01 RX ADMIN — MEROPENEM 1000 MG: 1 INJECTION INTRAVENOUS at 02:17

## 2022-01-01 RX ADMIN — MONTELUKAST 10 MG: 10 TABLET, FILM COATED ORAL at 23:02

## 2022-01-01 RX ADMIN — INSULIN LISPRO 1 UNITS: 100 INJECTION, SOLUTION INTRAVENOUS; SUBCUTANEOUS at 21:18

## 2022-01-01 RX ADMIN — Medication 10 ML: at 12:07

## 2022-01-01 RX ADMIN — PIPERACILLIN AND TAZOBACTAM 3375 MG: 3; .375 INJECTION, POWDER, LYOPHILIZED, FOR SOLUTION INTRAVENOUS at 21:43

## 2022-01-01 RX ADMIN — CEFTRIAXONE SODIUM 1000 MG: 1 INJECTION, POWDER, FOR SOLUTION INTRAMUSCULAR; INTRAVENOUS at 20:54

## 2022-01-01 RX ADMIN — FUROSEMIDE 40 MG: 40 TABLET ORAL at 08:15

## 2022-01-01 RX ADMIN — INSULIN GLARGINE 40 UNITS: 100 INJECTION, SOLUTION SUBCUTANEOUS at 20:26

## 2022-01-01 RX ADMIN — SODIUM CHLORIDE, PRESERVATIVE FREE 40 MG: 5 INJECTION INTRAVENOUS at 09:08

## 2022-01-01 RX ADMIN — MEROPENEM 1000 MG: 1 INJECTION INTRAVENOUS at 03:47

## 2022-01-01 RX ADMIN — LEVOTHYROXINE SODIUM 50 MCG: 0.05 TABLET ORAL at 05:53

## 2022-01-01 RX ADMIN — BUSPIRONE HYDROCHLORIDE 10 MG: 10 TABLET ORAL at 10:15

## 2022-01-01 RX ADMIN — Medication 10 ML: at 09:00

## 2022-01-01 RX ADMIN — METOPROLOL TARTRATE 5 MG: 1 INJECTION, SOLUTION INTRAVENOUS at 17:00

## 2022-01-01 RX ADMIN — TAMSULOSIN HYDROCHLORIDE 0.4 MG: 0.4 CAPSULE ORAL at 16:28

## 2022-01-01 RX ADMIN — GABAPENTIN 600 MG: 300 CAPSULE ORAL at 23:00

## 2022-01-01 RX ADMIN — DICYCLOMINE HYDROCHLORIDE 20 MG: 10 SOLUTION ORAL at 14:17

## 2022-01-01 RX ADMIN — SODIUM ZIRCONIUM CYCLOSILICATE 10 G: 10 POWDER, FOR SUSPENSION ORAL at 13:34

## 2022-01-01 RX ADMIN — SODIUM CHLORIDE: 9 INJECTION, SOLUTION INTRAVENOUS at 05:56

## 2022-01-01 RX ADMIN — MICONAZOLE NITRATE: 2 POWDER TOPICAL at 20:50

## 2022-01-01 RX ADMIN — SERTRALINE 200 MG: 100 TABLET, FILM COATED ORAL at 11:28

## 2022-01-01 RX ADMIN — GABAPENTIN 600 MG: 300 CAPSULE ORAL at 20:26

## 2022-01-01 RX ADMIN — MONTELUKAST 10 MG: 10 TABLET, FILM COATED ORAL at 20:47

## 2022-01-01 RX ADMIN — ISOSORBIDE DINITRATE 20 MG: 10 TABLET ORAL at 08:15

## 2022-01-01 RX ADMIN — HYDROMORPHONE HYDROCHLORIDE 0.5 MG: 1 INJECTION, SOLUTION INTRAMUSCULAR; INTRAVENOUS; SUBCUTANEOUS at 16:09

## 2022-01-01 RX ADMIN — SODIUM CHLORIDE: 9 INJECTION, SOLUTION INTRAVENOUS at 11:57

## 2022-01-01 RX ADMIN — SODIUM ZIRCONIUM CYCLOSILICATE 10 G: 10 POWDER, FOR SUSPENSION ORAL at 18:08

## 2022-01-01 RX ADMIN — ISOSORBIDE DINITRATE 20 MG: 20 TABLET ORAL at 09:33

## 2022-01-01 RX ADMIN — POLYVINYL ALCOHOL 1 DROP: 14 SOLUTION/ DROPS OPHTHALMIC at 22:00

## 2022-01-01 RX ADMIN — MORPHINE SULFATE 1 MG: 2 INJECTION, SOLUTION INTRAMUSCULAR; INTRAVENOUS at 08:12

## 2022-01-01 RX ADMIN — SERTRALINE 200 MG: 100 TABLET, FILM COATED ORAL at 08:44

## 2022-01-01 RX ADMIN — MEROPENEM 1000 MG: 1 INJECTION INTRAVENOUS at 12:04

## 2022-01-01 RX ADMIN — OXYBUTYNIN CHLORIDE 5 MG: 5 TABLET, EXTENDED RELEASE ORAL at 09:32

## 2022-01-01 RX ADMIN — SODIUM CHLORIDE: 9 INJECTION, SOLUTION INTRAVENOUS at 18:19

## 2022-01-01 RX ADMIN — MICONAZOLE NITRATE: 2 POWDER TOPICAL at 08:42

## 2022-01-01 RX ADMIN — HALOPERIDOL 5 MG: 5 TABLET ORAL at 08:20

## 2022-01-01 RX ADMIN — SENNOSIDES AND DOCUSATE SODIUM 2 TABLET: 50; 8.6 TABLET ORAL at 08:43

## 2022-01-01 RX ADMIN — ENOXAPARIN SODIUM 30 MG: 100 INJECTION SUBCUTANEOUS at 21:09

## 2022-01-01 RX ADMIN — Medication 6 MCG/MIN: at 17:46

## 2022-01-01 RX ADMIN — SODIUM CHLORIDE: 9 INJECTION, SOLUTION INTRAVENOUS at 03:25

## 2022-01-01 RX ADMIN — PRAZOSIN HYDROCHLORIDE 2 MG: 2 CAPSULE ORAL at 21:45

## 2022-01-01 RX ADMIN — MONTELUKAST 10 MG: 10 TABLET, FILM COATED ORAL at 22:03

## 2022-01-01 RX ADMIN — PIPERACILLIN AND TAZOBACTAM 3375 MG: 3; .375 INJECTION, POWDER, LYOPHILIZED, FOR SOLUTION INTRAVENOUS at 14:16

## 2022-01-01 RX ADMIN — 0.12% CHLORHEXIDINE GLUCONATE 15 ML: 1.2 RINSE ORAL at 13:34

## 2022-01-01 RX ADMIN — SENNOSIDES AND DOCUSATE SODIUM 2 TABLET: 50; 8.6 TABLET ORAL at 13:34

## 2022-01-01 RX ADMIN — POLYETHYLENE GLYCOL 3350 17 G: 17 POWDER, FOR SOLUTION ORAL at 05:34

## 2022-01-01 RX ADMIN — SODIUM CHLORIDE: 9 INJECTION, SOLUTION INTRAVENOUS at 04:17

## 2022-01-01 RX ADMIN — ENOXAPARIN SODIUM 30 MG: 100 INJECTION SUBCUTANEOUS at 10:17

## 2022-01-01 RX ADMIN — ACETAMINOPHEN 650 MG: 325 TABLET ORAL at 20:03

## 2022-01-01 RX ADMIN — ISOSORBIDE DINITRATE 20 MG: 10 TABLET ORAL at 10:15

## 2022-01-01 RX ADMIN — POLYETHYLENE GLYCOL 3350 17 G: 17 POWDER, FOR SOLUTION ORAL at 20:53

## 2022-01-01 RX ADMIN — VANCOMYCIN HYDROCHLORIDE 750 MG: 750 INJECTION, POWDER, LYOPHILIZED, FOR SOLUTION INTRAVENOUS at 20:32

## 2022-01-01 RX ADMIN — DOPAMINE HYDROCHLORIDE 20 MCG/KG/MIN: 160 INJECTION, SOLUTION INTRAVENOUS at 10:01

## 2022-01-01 RX ADMIN — DOXEPIN HYDROCHLORIDE 150 MG: 75 CAPSULE ORAL at 23:00

## 2022-01-01 RX ADMIN — Medication 15 MCG/MIN: at 11:44

## 2022-01-01 RX ADMIN — SODIUM ZIRCONIUM CYCLOSILICATE 10 G: 10 POWDER, FOR SUSPENSION ORAL at 22:59

## 2022-01-01 RX ADMIN — ATORVASTATIN CALCIUM 80 MG: 80 TABLET, FILM COATED ORAL at 21:07

## 2022-01-01 RX ADMIN — ROCURONIUM BROMIDE 10 MG: 10 INJECTION INTRAVENOUS at 13:40

## 2022-01-01 RX ADMIN — ONDANSETRON 4 MG: 2 INJECTION INTRAMUSCULAR; INTRAVENOUS at 14:00

## 2022-01-01 RX ADMIN — Medication 10 ML: at 21:09

## 2022-01-01 RX ADMIN — FUROSEMIDE 40 MG: 10 INJECTION, SOLUTION INTRAMUSCULAR; INTRAVENOUS at 11:08

## 2022-01-01 RX ADMIN — RANOLAZINE 1000 MG: 500 TABLET, FILM COATED, EXTENDED RELEASE ORAL at 09:32

## 2022-01-01 RX ADMIN — SODIUM CHLORIDE: 9 INJECTION, SOLUTION INTRAVENOUS at 13:50

## 2022-01-01 RX ADMIN — MEROPENEM 1000 MG: 1 INJECTION INTRAVENOUS at 20:04

## 2022-01-01 RX ADMIN — HEPARIN SODIUM AND DEXTROSE 12 UNITS/KG/HR: 10000; 5 INJECTION INTRAVENOUS at 15:39

## 2022-01-01 RX ADMIN — SODIUM CHLORIDE: 9 INJECTION, SOLUTION INTRAVENOUS at 05:42

## 2022-01-01 RX ADMIN — MECLIZINE HYDROCHLORIDE 25 MG: 25 TABLET ORAL at 09:32

## 2022-01-01 RX ADMIN — POLYVINYL ALCOHOL 1 DROP: 14 SOLUTION/ DROPS OPHTHALMIC at 09:05

## 2022-01-01 RX ADMIN — PROPOFOL 25 MCG/KG/MIN: 10 INJECTION, EMULSION INTRAVENOUS at 13:25

## 2022-01-01 RX ADMIN — SENNOSIDES AND DOCUSATE SODIUM 2 TABLET: 50; 8.6 TABLET ORAL at 20:54

## 2022-01-01 RX ADMIN — MECLIZINE HYDROCHLORIDE 25 MG: 25 TABLET ORAL at 10:14

## 2022-01-01 RX ADMIN — SODIUM CHLORIDE 500 ML: 9 INJECTION, SOLUTION INTRAVENOUS at 07:54

## 2022-01-01 RX ADMIN — SERTRALINE 200 MG: 100 TABLET, FILM COATED ORAL at 13:34

## 2022-01-01 RX ADMIN — VANCOMYCIN HYDROCHLORIDE 1250 MG: 1.25 INJECTION, POWDER, LYOPHILIZED, FOR SOLUTION INTRAVENOUS at 20:03

## 2022-01-01 RX ADMIN — PRAZOSIN HYDROCHLORIDE 2 MG: 2 CAPSULE ORAL at 22:03

## 2022-01-01 RX ADMIN — INSULIN GLARGINE 40 UNITS: 100 INJECTION, SOLUTION SUBCUTANEOUS at 21:32

## 2022-01-01 RX ADMIN — DEXAMETHASONE SODIUM PHOSPHATE 4 MG: 4 INJECTION, SOLUTION INTRAMUSCULAR; INTRAVENOUS at 13:05

## 2022-01-01 RX ADMIN — CALCIUM CHLORIDE 1000 MG: 100 INJECTION INTRAVENOUS; INTRAVENTRICULAR at 06:23

## 2022-01-01 RX ADMIN — Medication 10 ML: at 21:02

## 2022-01-01 RX ADMIN — LACTULOSE 30 G: 20 SOLUTION ORAL at 16:59

## 2022-01-01 RX ADMIN — HYDROMORPHONE HYDROCHLORIDE 0.25 MG: 1 INJECTION, SOLUTION INTRAMUSCULAR; INTRAVENOUS; SUBCUTANEOUS at 23:40

## 2022-01-01 RX ADMIN — HYDROMORPHONE HYDROCHLORIDE 1 MG: 1 INJECTION, SOLUTION INTRAMUSCULAR; INTRAVENOUS; SUBCUTANEOUS at 20:31

## 2022-01-01 RX ADMIN — INSULIN LISPRO 2 UNITS: 100 INJECTION, SOLUTION INTRAVENOUS; SUBCUTANEOUS at 12:06

## 2022-01-01 RX ADMIN — ONDANSETRON 4 MG: 2 INJECTION INTRAMUSCULAR; INTRAVENOUS at 23:40

## 2022-01-01 RX ADMIN — LEVOTHYROXINE SODIUM 50 MCG: 0.05 TABLET ORAL at 06:05

## 2022-01-01 RX ADMIN — LEVOTHYROXINE SODIUM 50 MCG: 0.05 TABLET ORAL at 06:35

## 2022-01-01 RX ADMIN — HEPARIN SODIUM AND DEXTROSE 7.5 UNITS/KG/HR: 10000; 5 INJECTION INTRAVENOUS at 17:49

## 2022-01-01 RX ADMIN — CYCLOSPORINE 1 DROP: 0.5 EMULSION OPHTHALMIC at 20:55

## 2022-01-01 RX ADMIN — SODIUM BICARBONATE 50 MEQ: 84 INJECTION, SOLUTION INTRAVENOUS at 06:10

## 2022-01-01 RX ADMIN — MICONAZOLE NITRATE: 2 POWDER TOPICAL at 12:07

## 2022-01-01 RX ADMIN — SUGAMMADEX 200 MG: 100 INJECTION, SOLUTION INTRAVENOUS at 14:21

## 2022-01-01 RX ADMIN — BUSPIRONE HYDROCHLORIDE 10 MG: 10 TABLET ORAL at 21:09

## 2022-01-01 RX ADMIN — ATORVASTATIN CALCIUM 80 MG: 80 TABLET, FILM COATED ORAL at 23:01

## 2022-01-01 RX ADMIN — METOPROLOL TARTRATE 5 MG: 1 INJECTION, SOLUTION INTRAVENOUS at 11:59

## 2022-01-01 RX ADMIN — SODIUM CHLORIDE 1000 ML: 9 INJECTION, SOLUTION INTRAVENOUS at 21:37

## 2022-01-01 RX ADMIN — DICYCLOMINE HYDROCHLORIDE 20 MG: 20 INJECTION INTRAMUSCULAR at 17:33

## 2022-01-01 RX ADMIN — INSULIN HUMAN 10 UNITS: 100 INJECTION, SOLUTION PARENTERAL at 06:27

## 2022-01-01 RX ADMIN — POTASSIUM CHLORIDE 20 MEQ: 29.8 INJECTION, SOLUTION INTRAVENOUS at 11:20

## 2022-01-01 RX ADMIN — 0.12% CHLORHEXIDINE GLUCONATE 15 ML: 1.2 RINSE ORAL at 20:54

## 2022-01-01 RX ADMIN — ACETAMINOPHEN 650 MG: 325 TABLET ORAL at 04:25

## 2022-01-01 RX ADMIN — Medication 10 ML: at 08:40

## 2022-01-01 RX ADMIN — METOPROLOL TARTRATE 5 MG: 1 INJECTION, SOLUTION INTRAVENOUS at 11:57

## 2022-01-01 RX ADMIN — ENOXAPARIN SODIUM 30 MG: 100 INJECTION SUBCUTANEOUS at 21:45

## 2022-01-01 RX ADMIN — IOPAMIDOL 100 ML: 612 INJECTION, SOLUTION INTRAVENOUS at 07:23

## 2022-01-01 RX ADMIN — POLYVINYL ALCOHOL 1 DROP: 14 SOLUTION/ DROPS OPHTHALMIC at 10:18

## 2022-01-01 RX ADMIN — POLYVINYL ALCOHOL 1 DROP: 14 SOLUTION/ DROPS OPHTHALMIC at 00:08

## 2022-01-01 RX ADMIN — RANOLAZINE 1000 MG: 500 TABLET, FILM COATED, EXTENDED RELEASE ORAL at 21:44

## 2022-01-01 RX ADMIN — MICONAZOLE NITRATE: 2 POWDER TOPICAL at 16:13

## 2022-01-01 RX ADMIN — ATORVASTATIN CALCIUM 80 MG: 80 TABLET, FILM COATED ORAL at 20:26

## 2022-01-01 RX ADMIN — SENNOSIDES AND DOCUSATE SODIUM 2 TABLET: 50; 8.6 TABLET ORAL at 09:07

## 2022-01-01 RX ADMIN — MIDAZOLAM 4 MG: 1 INJECTION, SOLUTION INTRAMUSCULAR; INTRAVENOUS at 10:06

## 2022-01-01 RX ADMIN — Medication 10 ML: at 08:47

## 2022-01-01 RX ADMIN — MONTELUKAST 10 MG: 10 TABLET, FILM COATED ORAL at 20:32

## 2022-01-01 RX ADMIN — METOPROLOL TARTRATE 5 MG: 1 INJECTION, SOLUTION INTRAVENOUS at 17:32

## 2022-01-01 RX ADMIN — ENOXAPARIN SODIUM 30 MG: 100 INJECTION SUBCUTANEOUS at 17:00

## 2022-01-01 RX ADMIN — FUROSEMIDE 80 MG: 10 INJECTION, SOLUTION INTRAMUSCULAR; INTRAVENOUS at 13:27

## 2022-01-01 RX ADMIN — DOXEPIN HYDROCHLORIDE 150 MG: 75 CAPSULE ORAL at 21:07

## 2022-01-01 RX ADMIN — MEROPENEM 1000 MG: 1 INJECTION INTRAVENOUS at 03:52

## 2022-01-01 RX ADMIN — Medication 5.1 MILLICURIE: at 13:58

## 2022-01-01 RX ADMIN — MONTELUKAST 10 MG: 10 TABLET, FILM COATED ORAL at 20:54

## 2022-01-01 RX ADMIN — SENNOSIDES AND DOCUSATE SODIUM 2 TABLET: 50; 8.6 TABLET ORAL at 21:08

## 2022-01-01 RX ADMIN — ENOXAPARIN SODIUM 30 MG: 100 INJECTION SUBCUTANEOUS at 20:25

## 2022-01-01 RX ADMIN — INSULIN LISPRO 2 UNITS: 100 INJECTION, SOLUTION INTRAVENOUS; SUBCUTANEOUS at 14:07

## 2022-01-01 RX ADMIN — Medication 10 ML: at 00:09

## 2022-01-01 RX ADMIN — METOPROLOL TARTRATE 5 MG: 1 INJECTION, SOLUTION INTRAVENOUS at 05:53

## 2022-01-01 RX ADMIN — FAMOTIDINE 20 MG: 10 INJECTION INTRAVENOUS at 13:11

## 2022-01-01 RX ADMIN — FUROSEMIDE 60 MG: 10 INJECTION, SOLUTION INTRAMUSCULAR; INTRAVENOUS at 08:58

## 2022-01-01 RX ADMIN — ISOSORBIDE DINITRATE 20 MG: 10 TABLET ORAL at 13:05

## 2022-01-01 RX ADMIN — ATORVASTATIN CALCIUM 80 MG: 80 TABLET, FILM COATED ORAL at 21:45

## 2022-01-01 RX ADMIN — EPINEPHRINE 1 MG: 0.1 INJECTION, SOLUTION ENDOTRACHEAL; INTRACARDIAC; INTRAVENOUS at 06:44

## 2022-01-01 RX ADMIN — ASPIRIN 81 MG: 81 TABLET, COATED ORAL at 14:45

## 2022-01-01 RX ADMIN — POLYVINYL ALCOHOL 1 DROP: 14 SOLUTION/ DROPS OPHTHALMIC at 21:58

## 2022-01-01 RX ADMIN — BUSPIRONE HYDROCHLORIDE 10 MG: 10 TABLET ORAL at 08:43

## 2022-01-01 RX ADMIN — SODIUM ZIRCONIUM CYCLOSILICATE 10 G: 10 POWDER, FOR SUSPENSION ORAL at 00:16

## 2022-01-01 RX ADMIN — SODIUM CHLORIDE 1000 ML: 9 INJECTION, SOLUTION INTRAVENOUS at 06:34

## 2022-01-01 RX ADMIN — ALBUMIN (HUMAN) 25 G: 0.25 INJECTION, SOLUTION INTRAVENOUS at 09:06

## 2022-01-01 RX ADMIN — Medication 10 MG: at 13:17

## 2022-01-01 RX ADMIN — MEROPENEM 1000 MG: 1 INJECTION INTRAVENOUS at 04:17

## 2022-01-01 RX ADMIN — CEFTRIAXONE SODIUM 1000 MG: 1 INJECTION, POWDER, FOR SOLUTION INTRAMUSCULAR; INTRAVENOUS at 15:33

## 2022-01-01 RX ADMIN — GABAPENTIN 600 MG: 300 CAPSULE ORAL at 08:15

## 2022-01-01 RX ADMIN — SERTRALINE 200 MG: 100 TABLET, FILM COATED ORAL at 10:14

## 2022-01-01 RX ADMIN — CYCLOSPORINE 1 DROP: 0.5 EMULSION OPHTHALMIC at 22:54

## 2022-01-01 RX ADMIN — ACETAMINOPHEN 650 MG: 325 TABLET ORAL at 22:01

## 2022-01-01 RX ADMIN — INSULIN GLARGINE 40 UNITS: 100 INJECTION, SOLUTION SUBCUTANEOUS at 21:52

## 2022-01-01 RX ADMIN — METOPROLOL TARTRATE 5 MG: 1 INJECTION, SOLUTION INTRAVENOUS at 06:14

## 2022-01-01 RX ADMIN — SENNOSIDES AND DOCUSATE SODIUM 2 TABLET: 50; 8.6 TABLET ORAL at 09:00

## 2022-01-01 RX ADMIN — ISOSORBIDE DINITRATE 20 MG: 20 TABLET ORAL at 22:54

## 2022-01-01 RX ADMIN — HEPARIN SODIUM AND DEXTROSE 9.5 UNITS/KG/HR: 10000; 5 INJECTION INTRAVENOUS at 04:23

## 2022-01-01 RX ADMIN — INSULIN LISPRO 1 UNITS: 100 INJECTION, SOLUTION INTRAVENOUS; SUBCUTANEOUS at 21:47

## 2022-01-01 RX ADMIN — POLYVINYL ALCOHOL 1 DROP: 14 SOLUTION/ DROPS OPHTHALMIC at 20:39

## 2022-01-01 RX ADMIN — METOPROLOL TARTRATE 5 MG: 1 INJECTION, SOLUTION INTRAVENOUS at 00:08

## 2022-01-01 RX ADMIN — MEROPENEM 1000 MG: 1 INJECTION INTRAVENOUS at 11:51

## 2022-01-01 RX ADMIN — PIPERACILLIN AND TAZOBACTAM 3375 MG: 3; .375 INJECTION, POWDER, LYOPHILIZED, FOR SOLUTION INTRAVENOUS at 15:06

## 2022-01-01 RX ADMIN — SODIUM CHLORIDE, PRESERVATIVE FREE 40 MG: 5 INJECTION INTRAVENOUS at 08:42

## 2022-01-01 RX ADMIN — MICONAZOLE NITRATE: 2 POWDER TOPICAL at 11:55

## 2022-01-01 RX ADMIN — BUSPIRONE HYDROCHLORIDE 10 MG: 10 TABLET ORAL at 20:44

## 2022-01-01 RX ADMIN — LEVOTHYROXINE SODIUM 50 MCG: 0.05 TABLET ORAL at 06:13

## 2022-01-01 RX ADMIN — TRAMADOL HYDROCHLORIDE 50 MG: 50 TABLET ORAL at 16:10

## 2022-01-01 RX ADMIN — SODIUM ZIRCONIUM CYCLOSILICATE 10 G: 10 POWDER, FOR SUSPENSION ORAL at 11:58

## 2022-01-01 RX ADMIN — TICAGRELOR 90 MG: 90 TABLET ORAL at 22:54

## 2022-01-01 RX ADMIN — INSULIN LISPRO 2 UNITS: 100 INJECTION, SOLUTION INTRAVENOUS; SUBCUTANEOUS at 17:00

## 2022-01-01 RX ADMIN — PROPOFOL 10 MCG/KG/MIN: 10 INJECTION, EMULSION INTRAVENOUS at 10:11

## 2022-01-01 RX ADMIN — ACETAMINOPHEN 650 MG: 325 TABLET ORAL at 23:31

## 2022-01-01 RX ADMIN — SODIUM CHLORIDE: 9 INJECTION, SOLUTION INTRAVENOUS at 02:43

## 2022-01-01 RX ADMIN — BUSPIRONE HYDROCHLORIDE 10 MG: 10 TABLET ORAL at 21:14

## 2022-01-01 RX ADMIN — HYDRALAZINE HYDROCHLORIDE 50 MG: 50 TABLET, FILM COATED ORAL at 21:45

## 2022-01-01 RX ADMIN — SODIUM CHLORIDE, PRESERVATIVE FREE 40 MG: 5 INJECTION INTRAVENOUS at 08:59

## 2022-01-01 RX ADMIN — MEROPENEM 1000 MG: 1 INJECTION INTRAVENOUS at 12:02

## 2022-01-01 RX ADMIN — MICONAZOLE NITRATE: 2 POWDER TOPICAL at 21:10

## 2022-01-01 RX ADMIN — ENOXAPARIN SODIUM 30 MG: 100 INJECTION SUBCUTANEOUS at 08:58

## 2022-01-01 RX ADMIN — DOXEPIN HYDROCHLORIDE 150 MG: 75 CAPSULE ORAL at 00:20

## 2022-01-01 RX ADMIN — DICYCLOMINE HYDROCHLORIDE 20 MG: 20 INJECTION INTRAMUSCULAR at 17:25

## 2022-01-01 RX ADMIN — LEVOTHYROXINE SODIUM 50 MCG: 0.05 TABLET ORAL at 07:27

## 2022-01-01 RX ADMIN — ENOXAPARIN SODIUM 30 MG: 100 INJECTION SUBCUTANEOUS at 09:32

## 2022-01-01 RX ADMIN — SODIUM CHLORIDE, PRESERVATIVE FREE 40 MG: 5 INJECTION INTRAVENOUS at 08:58

## 2022-01-01 RX ADMIN — LACTULOSE 30 G: 20 SOLUTION ORAL at 11:29

## 2022-01-01 RX ADMIN — ISOSORBIDE DINITRATE 20 MG: 10 TABLET ORAL at 21:45

## 2022-01-01 RX ADMIN — INSULIN LISPRO 1 UNITS: 100 INJECTION, SOLUTION INTRAVENOUS; SUBCUTANEOUS at 11:56

## 2022-01-01 RX ADMIN — MEROPENEM 1000 MG: 1 INJECTION INTRAVENOUS at 13:07

## 2022-01-01 RX ADMIN — POLYVINYL ALCOHOL 1 DROP: 14 SOLUTION/ DROPS OPHTHALMIC at 21:00

## 2022-01-01 RX ADMIN — LEVOTHYROXINE SODIUM 50 MCG: 0.05 TABLET ORAL at 13:34

## 2022-01-01 RX ADMIN — Medication 10 ML: at 09:47

## 2022-01-01 RX ADMIN — SODIUM CHLORIDE: 9 INJECTION, SOLUTION INTRAVENOUS at 18:55

## 2022-01-01 RX ADMIN — CARVEDILOL 6.25 MG: 6.25 TABLET, FILM COATED ORAL at 08:52

## 2022-01-01 RX ADMIN — BUSPIRONE HYDROCHLORIDE 10 MG: 10 TABLET ORAL at 23:02

## 2022-01-01 RX ADMIN — MEROPENEM 1000 MG: 1 INJECTION INTRAVENOUS at 21:43

## 2022-01-01 RX ADMIN — ENOXAPARIN SODIUM 30 MG: 100 INJECTION SUBCUTANEOUS at 11:36

## 2022-01-01 RX ADMIN — ACETAMINOPHEN 650 MG: 325 TABLET ORAL at 06:10

## 2022-01-01 RX ADMIN — POLYVINYL ALCOHOL 1 DROP: 14 SOLUTION/ DROPS OPHTHALMIC at 14:11

## 2022-01-01 RX ADMIN — PRAZOSIN HYDROCHLORIDE 2 MG: 2 CAPSULE ORAL at 23:01

## 2022-01-01 RX ADMIN — SODIUM CHLORIDE: 9 INJECTION, SOLUTION INTRAVENOUS at 16:31

## 2022-01-01 RX ADMIN — PRAZOSIN HYDROCHLORIDE 1 MG: 1 CAPSULE ORAL at 21:09

## 2022-01-01 RX ADMIN — EPINEPHRINE 1 MG: 0.1 INJECTION, SOLUTION ENDOTRACHEAL; INTRACARDIAC; INTRAVENOUS at 06:47

## 2022-01-01 RX ADMIN — Medication 10 ML: at 09:45

## 2022-01-01 RX ADMIN — MECLIZINE HYDROCHLORIDE 25 MG: 25 TABLET ORAL at 16:56

## 2022-01-01 RX ADMIN — INSULIN LISPRO 1 UNITS: 100 INJECTION, SOLUTION INTRAVENOUS; SUBCUTANEOUS at 12:11

## 2022-01-01 RX ADMIN — FUROSEMIDE 40 MG: 10 INJECTION, SOLUTION INTRAMUSCULAR; INTRAVENOUS at 08:11

## 2022-01-01 RX ADMIN — LEVOTHYROXINE SODIUM 50 MCG: 0.05 TABLET ORAL at 05:56

## 2022-01-01 RX ADMIN — SODIUM CHLORIDE 1000 ML: 9 INJECTION, SOLUTION INTRAVENOUS at 06:50

## 2022-01-01 RX ADMIN — PIPERACILLIN AND TAZOBACTAM 3375 MG: 3; .375 INJECTION, POWDER, LYOPHILIZED, FOR SOLUTION INTRAVENOUS at 06:36

## 2022-01-01 RX ADMIN — CALCIUM GLUCONATE 1000 MG: 98 INJECTION, SOLUTION INTRAVENOUS at 16:36

## 2022-01-01 RX ADMIN — METOPROLOL TARTRATE 5 MG: 1 INJECTION, SOLUTION INTRAVENOUS at 05:40

## 2022-01-01 RX ADMIN — Medication 10 ML: at 08:48

## 2022-01-01 RX ADMIN — MONTELUKAST 10 MG: 10 TABLET, FILM COATED ORAL at 20:44

## 2022-01-01 RX ADMIN — METOPROLOL SUCCINATE 50 MG: 50 TABLET, EXTENDED RELEASE ORAL at 21:07

## 2022-01-01 RX ADMIN — MEROPENEM 1000 MG: 1 INJECTION INTRAVENOUS at 18:51

## 2022-01-01 RX ADMIN — OXYCODONE HYDROCHLORIDE AND ACETAMINOPHEN 2 TABLET: 5; 325 TABLET ORAL at 18:08

## 2022-01-01 RX ADMIN — MORPHINE SULFATE 1 MG: 2 INJECTION, SOLUTION INTRAMUSCULAR; INTRAVENOUS at 11:30

## 2022-01-01 RX ADMIN — HALOPERIDOL 5 MG: 5 TABLET ORAL at 08:53

## 2022-01-01 RX ADMIN — EPINEPHRINE 1 MG: 0.1 INJECTION, SOLUTION ENDOTRACHEAL; INTRACARDIAC; INTRAVENOUS at 06:13

## 2022-01-01 RX ADMIN — MEROPENEM 1000 MG: 1 INJECTION INTRAVENOUS at 11:58

## 2022-01-01 RX ADMIN — RANOLAZINE 1000 MG: 500 TABLET, FILM COATED, EXTENDED RELEASE ORAL at 10:14

## 2022-01-01 RX ADMIN — HEPARIN SODIUM AND DEXTROSE 7.5 UNITS/KG/HR: 10000; 5 INJECTION INTRAVENOUS at 00:58

## 2022-01-01 RX ADMIN — RANOLAZINE 1000 MG: 500 TABLET, FILM COATED, EXTENDED RELEASE ORAL at 21:14

## 2022-01-01 RX ADMIN — HEPARIN SODIUM AND DEXTROSE 10 UNITS/KG/HR: 10000; 5 INJECTION INTRAVENOUS at 11:33

## 2022-01-01 RX ADMIN — POTASSIUM CHLORIDE 20 MEQ: 1500 TABLET, EXTENDED RELEASE ORAL at 11:29

## 2022-01-01 RX ADMIN — SODIUM CHLORIDE 25 ML: 900 INJECTION INTRAVENOUS at 18:50

## 2022-01-01 RX ADMIN — ISOSORBIDE DINITRATE 20 MG: 10 TABLET ORAL at 20:27

## 2022-01-01 RX ADMIN — Medication 10 ML: at 20:54

## 2022-01-01 RX ADMIN — LIDOCAINE HYDROCHLORIDE 5 ML: 20 INJECTION, SOLUTION INFILTRATION; PERINEURAL at 18:05

## 2022-01-01 RX ADMIN — DICYCLOMINE HYDROCHLORIDE 20 MG: 20 INJECTION INTRAMUSCULAR at 11:50

## 2022-01-01 RX ADMIN — HYDROMORPHONE HYDROCHLORIDE 0.5 MG: 1 INJECTION, SOLUTION INTRAMUSCULAR; INTRAVENOUS; SUBCUTANEOUS at 20:31

## 2022-01-01 RX ADMIN — DICYCLOMINE HYDROCHLORIDE 20 MG: 20 INJECTION INTRAMUSCULAR at 21:10

## 2022-01-01 RX ADMIN — METOPROLOL TARTRATE 5 MG: 1 INJECTION, SOLUTION INTRAVENOUS at 18:29

## 2022-01-01 RX ADMIN — Medication 20 MCG/MIN: at 11:19

## 2022-01-01 RX ADMIN — POLYVINYL ALCOHOL 1 DROP: 14 SOLUTION/ DROPS OPHTHALMIC at 10:26

## 2022-01-01 RX ADMIN — INSULIN LISPRO 1 UNITS: 100 INJECTION, SOLUTION INTRAVENOUS; SUBCUTANEOUS at 01:29

## 2022-01-01 RX ADMIN — SENNOSIDES AND DOCUSATE SODIUM 2 TABLET: 50; 8.6 TABLET ORAL at 20:04

## 2022-01-01 RX ADMIN — INSULIN LISPRO 1 UNITS: 100 INJECTION, SOLUTION INTRAVENOUS; SUBCUTANEOUS at 17:54

## 2022-01-01 RX ADMIN — LIDOCAINE HYDROCHLORIDE: 20 SOLUTION ORAL; TOPICAL at 06:42

## 2022-01-01 RX ADMIN — POTASSIUM CHLORIDE 20 MEQ: 1500 TABLET, EXTENDED RELEASE ORAL at 16:59

## 2022-01-01 RX ADMIN — MEROPENEM 1000 MG: 1 INJECTION INTRAVENOUS at 20:35

## 2022-01-01 RX ADMIN — MICONAZOLE NITRATE: 2 POWDER TOPICAL at 20:03

## 2022-01-01 RX ADMIN — GABAPENTIN 300 MG: 300 CAPSULE ORAL at 22:03

## 2022-01-01 RX ADMIN — EPINEPHRINE 1 MG: 0.1 INJECTION, SOLUTION ENDOTRACHEAL; INTRACARDIAC; INTRAVENOUS at 06:27

## 2022-01-01 RX ADMIN — HYDROXYZINE PAMOATE 50 MG: 50 CAPSULE ORAL at 08:48

## 2022-01-01 RX ADMIN — RANOLAZINE 1000 MG: 500 TABLET, FILM COATED, EXTENDED RELEASE ORAL at 08:15

## 2022-01-01 RX ADMIN — POLYVINYL ALCOHOL 1 DROP: 14 SOLUTION/ DROPS OPHTHALMIC at 21:39

## 2022-01-01 RX ADMIN — ISOSORBIDE DINITRATE 20 MG: 10 TABLET ORAL at 14:38

## 2022-01-01 RX ADMIN — SODIUM CHLORIDE: 9 INJECTION, SOLUTION INTRAVENOUS at 21:24

## 2022-01-01 RX ADMIN — ENOXAPARIN SODIUM 30 MG: 100 INJECTION SUBCUTANEOUS at 20:26

## 2022-01-01 RX ADMIN — METOPROLOL TARTRATE 5 MG: 1 INJECTION, SOLUTION INTRAVENOUS at 00:12

## 2022-01-01 RX ADMIN — HYDRALAZINE HYDROCHLORIDE 50 MG: 50 TABLET, FILM COATED ORAL at 13:27

## 2022-01-01 RX ADMIN — DICYCLOMINE HYDROCHLORIDE 20 MG: 20 INJECTION INTRAMUSCULAR at 18:29

## 2022-01-01 RX ADMIN — ENOXAPARIN SODIUM 30 MG: 100 INJECTION SUBCUTANEOUS at 08:15

## 2022-01-01 RX ADMIN — LEVOTHYROXINE SODIUM 50 MCG: 50 TABLET ORAL at 06:11

## 2022-01-01 RX ADMIN — INSULIN HUMAN 10 UNITS: 100 INJECTION, SOLUTION PARENTERAL at 16:34

## 2022-01-01 RX ADMIN — Medication 5 MCG/MIN: at 17:06

## 2022-01-01 RX ADMIN — MONTELUKAST 10 MG: 10 TABLET, FILM COATED ORAL at 21:44

## 2022-01-01 RX ADMIN — METOPROLOL TARTRATE 5 MG: 1 INJECTION, SOLUTION INTRAVENOUS at 06:08

## 2022-01-01 RX ADMIN — Medication 20 MG: at 14:37

## 2022-01-01 RX ADMIN — LEVOTHYROXINE SODIUM 50 MCG: 0.05 TABLET ORAL at 09:00

## 2022-01-01 RX ADMIN — ONDANSETRON 4 MG: 2 INJECTION INTRAMUSCULAR; INTRAVENOUS at 13:11

## 2022-01-01 RX ADMIN — SODIUM BICARBONATE 50 MEQ: 84 INJECTION, SOLUTION INTRAVENOUS at 10:11

## 2022-01-01 RX ADMIN — SODIUM BICARBONATE 50 MEQ: 84 INJECTION, SOLUTION INTRAVENOUS at 16:32

## 2022-01-01 RX ADMIN — MECLIZINE HYDROCHLORIDE 25 MG: 25 TABLET ORAL at 16:28

## 2022-01-01 RX ADMIN — SERTRALINE 200 MG: 100 TABLET, FILM COATED ORAL at 12:04

## 2022-01-01 RX ADMIN — BUSPIRONE HYDROCHLORIDE 10 MG: 10 TABLET ORAL at 21:45

## 2022-01-01 RX ADMIN — INSULIN GLARGINE 40 UNITS: 100 INJECTION, SOLUTION SUBCUTANEOUS at 21:18

## 2022-01-01 RX ADMIN — CEFAZOLIN 2000 MG: 10 INJECTION, POWDER, FOR SOLUTION INTRAVENOUS at 13:01

## 2022-01-01 RX ADMIN — MONTELUKAST 10 MG: 10 TABLET, FILM COATED ORAL at 22:01

## 2022-01-01 RX ADMIN — SERTRALINE 200 MG: 100 TABLET, FILM COATED ORAL at 09:07

## 2022-01-01 RX ADMIN — FUROSEMIDE 40 MG: 10 INJECTION, SOLUTION INTRAMUSCULAR; INTRAVENOUS at 11:29

## 2022-01-01 RX ADMIN — ONDANSETRON 4 MG: 2 INJECTION INTRAMUSCULAR; INTRAVENOUS at 15:00

## 2022-01-01 RX ADMIN — INSULIN GLARGINE 40 UNITS: 100 INJECTION, SOLUTION SUBCUTANEOUS at 23:00

## 2022-01-01 RX ADMIN — ASPIRIN 81 MG: 81 TABLET, COATED ORAL at 09:32

## 2022-01-01 RX ADMIN — SERTRALINE 200 MG: 100 TABLET, FILM COATED ORAL at 09:00

## 2022-01-01 RX ADMIN — SERTRALINE 200 MG: 100 TABLET, FILM COATED ORAL at 08:52

## 2022-01-01 RX ADMIN — ACETAMINOPHEN 650 MG: 325 TABLET ORAL at 14:44

## 2022-01-01 RX ADMIN — MONTELUKAST 10 MG: 10 TABLET, FILM COATED ORAL at 20:04

## 2022-01-01 RX ADMIN — SENNOSIDES AND DOCUSATE SODIUM 2 TABLET: 50; 8.6 TABLET ORAL at 08:52

## 2022-01-01 RX ADMIN — PRAZOSIN HYDROCHLORIDE 1 MG: 1 CAPSULE ORAL at 20:26

## 2022-01-01 RX ADMIN — MORPHINE SULFATE 4 MG: 4 INJECTION, SOLUTION INTRAMUSCULAR; INTRAVENOUS at 13:15

## 2022-01-01 RX ADMIN — HYDRALAZINE HYDROCHLORIDE 50 MG: 50 TABLET, FILM COATED ORAL at 14:38

## 2022-01-01 RX ADMIN — ENOXAPARIN SODIUM 30 MG: 100 INJECTION SUBCUTANEOUS at 20:44

## 2022-01-01 RX ADMIN — PRAZOSIN HYDROCHLORIDE 1 MG: 1 CAPSULE ORAL at 20:04

## 2022-01-01 RX ADMIN — SENNOSIDES AND DOCUSATE SODIUM 2 TABLET: 50; 8.6 TABLET ORAL at 20:44

## 2022-01-01 RX ADMIN — ENOXAPARIN SODIUM 30 MG: 100 INJECTION SUBCUTANEOUS at 22:03

## 2022-01-01 RX ADMIN — Medication 10 ML: at 20:26

## 2022-01-01 RX ADMIN — METOPROLOL TARTRATE 5 MG: 1 INJECTION, SOLUTION INTRAVENOUS at 17:27

## 2022-01-01 RX ADMIN — SODIUM CHLORIDE, POTASSIUM CHLORIDE, SODIUM LACTATE AND CALCIUM CHLORIDE 500 ML: 600; 310; 30; 20 INJECTION, SOLUTION INTRAVENOUS at 11:17

## 2022-01-01 RX ADMIN — HYDRALAZINE HYDROCHLORIDE 50 MG: 50 TABLET, FILM COATED ORAL at 21:07

## 2022-01-01 RX ADMIN — OXYBUTYNIN CHLORIDE 5 MG: 5 TABLET, EXTENDED RELEASE ORAL at 21:14

## 2022-01-01 RX ADMIN — ONDANSETRON 4 MG: 2 INJECTION INTRAMUSCULAR; INTRAVENOUS at 21:38

## 2022-01-01 RX ADMIN — SODIUM ZIRCONIUM CYCLOSILICATE 10 G: 10 POWDER, FOR SUSPENSION ORAL at 12:09

## 2022-01-01 RX ADMIN — HYDRALAZINE HYDROCHLORIDE 50 MG: 50 TABLET, FILM COATED ORAL at 08:30

## 2022-01-01 RX ADMIN — Medication 10 ML: at 20:44

## 2022-01-01 RX ADMIN — POLYVINYL ALCOHOL 1 DROP: 14 SOLUTION/ DROPS OPHTHALMIC at 20:51

## 2022-01-01 RX ADMIN — OXYBUTYNIN CHLORIDE 5 MG: 5 TABLET, EXTENDED RELEASE ORAL at 21:45

## 2022-01-01 RX ADMIN — POLYVINYL ALCOHOL 1 DROP: 14 SOLUTION/ DROPS OPHTHALMIC at 08:40

## 2022-01-01 RX ADMIN — INSULIN LISPRO 2 UNITS: 100 INJECTION, SOLUTION INTRAVENOUS; SUBCUTANEOUS at 00:44

## 2022-01-01 RX ADMIN — KETOROLAC TROMETHAMINE 30 MG: 30 INJECTION, SOLUTION INTRAMUSCULAR at 21:38

## 2022-01-01 RX ADMIN — METOPROLOL SUCCINATE 50 MG: 50 TABLET, EXTENDED RELEASE ORAL at 23:01

## 2022-01-01 RX ADMIN — POLYVINYL ALCOHOL 1 DROP: 14 SOLUTION/ DROPS OPHTHALMIC at 21:21

## 2022-01-01 RX ADMIN — DICYCLOMINE HYDROCHLORIDE 20 MG: 20 INJECTION INTRAMUSCULAR at 21:35

## 2022-01-01 RX ADMIN — DEXTROSE 50 % IN WATER (D50W) INTRAVENOUS SYRINGE 25 G: at 06:26

## 2022-01-01 RX ADMIN — POLYVINYL ALCOHOL 1 DROP: 14 SOLUTION/ DROPS OPHTHALMIC at 12:07

## 2022-01-01 RX ADMIN — LEVOTHYROXINE SODIUM 50 MCG: 0.05 TABLET ORAL at 05:42

## 2022-01-01 RX ADMIN — INSULIN GLARGINE 40 UNITS: 100 INJECTION, SOLUTION SUBCUTANEOUS at 21:46

## 2022-01-01 RX ADMIN — METOPROLOL SUCCINATE 50 MG: 50 TABLET, EXTENDED RELEASE ORAL at 10:15

## 2022-01-01 RX ADMIN — FENTANYL CITRATE 50 MCG/HR: 0.05 INJECTION, SOLUTION INTRAMUSCULAR; INTRAVENOUS at 11:43

## 2022-01-01 RX ADMIN — Medication 10 MCG/MIN: at 08:59

## 2022-01-01 RX ADMIN — INSULIN GLARGINE 60 UNITS: 100 INJECTION, SOLUTION SUBCUTANEOUS at 20:47

## 2022-01-01 RX ADMIN — HYDROMORPHONE HYDROCHLORIDE 0.5 MG: 1 INJECTION, SOLUTION INTRAMUSCULAR; INTRAVENOUS; SUBCUTANEOUS at 12:01

## 2022-01-01 RX ADMIN — BUSPIRONE HYDROCHLORIDE 10 MG: 10 TABLET ORAL at 20:31

## 2022-01-01 RX ADMIN — HALOPERIDOL 5 MG: 5 TABLET ORAL at 10:17

## 2022-01-01 RX ADMIN — DEXTROSE MONOHYDRATE 250 ML: 100 INJECTION, SOLUTION INTRAVENOUS at 22:46

## 2022-01-01 RX ADMIN — OXYBUTYNIN CHLORIDE 5 MG: 5 TABLET, EXTENDED RELEASE ORAL at 20:26

## 2022-01-01 RX ADMIN — IOPAMIDOL 100 ML: 612 INJECTION, SOLUTION INTRAVENOUS at 13:56

## 2022-01-01 RX ADMIN — INSULIN LISPRO 1 UNITS: 100 INJECTION, SOLUTION INTRAVENOUS; SUBCUTANEOUS at 17:26

## 2022-01-01 RX ADMIN — DEXTROSE 50 % IN WATER (D50W) INTRAVENOUS SYRINGE 25 G: at 06:33

## 2022-01-01 RX ADMIN — IOPAMIDOL 100 ML: 612 INJECTION, SOLUTION INTRAVENOUS at 08:52

## 2022-01-01 RX ADMIN — PROPOFOL 25 MCG/KG/MIN: 10 INJECTION, EMULSION INTRAVENOUS at 21:33

## 2022-01-01 RX ADMIN — SODIUM CHLORIDE, PRESERVATIVE FREE 40 MG: 5 INJECTION INTRAVENOUS at 13:29

## 2022-01-01 RX ADMIN — POTASSIUM CHLORIDE 20 MEQ: 1500 TABLET, EXTENDED RELEASE ORAL at 11:08

## 2022-01-01 RX ADMIN — HYDROMORPHONE HYDROCHLORIDE 0.5 MG: 1 INJECTION, SOLUTION INTRAMUSCULAR; INTRAVENOUS; SUBCUTANEOUS at 11:44

## 2022-01-01 RX ADMIN — RANOLAZINE 1000 MG: 500 TABLET, FILM COATED, EXTENDED RELEASE ORAL at 22:03

## 2022-01-01 RX ADMIN — GABAPENTIN 600 MG: 300 CAPSULE ORAL at 10:14

## 2022-01-01 RX ADMIN — PROPOFOL 120 MG: 10 INJECTION, EMULSION INTRAVENOUS at 12:51

## 2022-01-01 RX ADMIN — MORPHINE SULFATE 1 MG: 2 INJECTION, SOLUTION INTRAMUSCULAR; INTRAVENOUS at 17:12

## 2022-01-01 RX ADMIN — ISOSORBIDE DINITRATE 20 MG: 10 TABLET ORAL at 23:02

## 2022-01-01 RX ADMIN — INSULIN GLARGINE 40 UNITS: 100 INJECTION, SOLUTION SUBCUTANEOUS at 23:02

## 2022-01-01 RX ADMIN — ROCURONIUM BROMIDE 40 MG: 10 INJECTION INTRAVENOUS at 12:51

## 2022-01-01 RX ADMIN — VANCOMYCIN HYDROCHLORIDE 750 MG: 750 INJECTION, POWDER, LYOPHILIZED, FOR SOLUTION INTRAVENOUS at 21:08

## 2022-01-01 RX ADMIN — PIPERACILLIN AND TAZOBACTAM 3375 MG: 3; .375 INJECTION, POWDER, LYOPHILIZED, FOR SOLUTION INTRAVENOUS at 05:32

## 2022-01-01 RX ADMIN — PRAZOSIN HYDROCHLORIDE 1 MG: 1 CAPSULE ORAL at 20:44

## 2022-01-01 RX ADMIN — POLYVINYL ALCOHOL 1 DROP: 14 SOLUTION/ DROPS OPHTHALMIC at 09:06

## 2022-01-01 RX ADMIN — BUSPIRONE HYDROCHLORIDE 10 MG: 10 TABLET ORAL at 08:52

## 2022-01-01 RX ADMIN — POTASSIUM CHLORIDE 20 MEQ: 1500 TABLET, EXTENDED RELEASE ORAL at 18:31

## 2022-01-01 RX ADMIN — EPINEPHRINE 1 MG: 0.1 INJECTION, SOLUTION ENDOTRACHEAL; INTRACARDIAC; INTRAVENOUS at 06:16

## 2022-01-01 RX ADMIN — ENOXAPARIN SODIUM 30 MG: 100 INJECTION SUBCUTANEOUS at 20:48

## 2022-01-01 RX ADMIN — POLYVINYL ALCOHOL 1 DROP: 14 SOLUTION/ DROPS OPHTHALMIC at 22:59

## 2022-01-01 RX ADMIN — MEROPENEM 1000 MG: 1 INJECTION INTRAVENOUS at 20:09

## 2022-01-01 RX ADMIN — DICYCLOMINE HYDROCHLORIDE 20 MG: 20 INJECTION INTRAMUSCULAR at 12:04

## 2022-01-01 RX ADMIN — SODIUM BICARBONATE 50 MEQ: 84 INJECTION, SOLUTION INTRAVENOUS at 06:18

## 2022-01-01 RX ADMIN — FENTANYL CITRATE 50 MCG/HR: 0.05 INJECTION, SOLUTION INTRAMUSCULAR; INTRAVENOUS at 03:20

## 2022-01-01 RX ADMIN — SODIUM CHLORIDE: 9 INJECTION, SOLUTION INTRAVENOUS at 09:17

## 2022-01-01 RX ADMIN — SENNOSIDES AND DOCUSATE SODIUM 2 TABLET: 50; 8.6 TABLET ORAL at 20:32

## 2022-01-01 RX ADMIN — MECLIZINE HYDROCHLORIDE 25 MG: 25 TABLET ORAL at 15:29

## 2022-01-01 RX ADMIN — CALCIUM CHLORIDE INJECTION 1000 MG: 100 INJECTION, SOLUTION INTRAVENOUS at 22:40

## 2022-01-01 RX ADMIN — INSULIN LISPRO 1 UNITS: 100 INJECTION, SOLUTION INTRAVENOUS; SUBCUTANEOUS at 00:11

## 2022-01-01 RX ADMIN — INSULIN LISPRO 2 UNITS: 100 INJECTION, SOLUTION INTRAVENOUS; SUBCUTANEOUS at 11:59

## 2022-01-01 RX ADMIN — NYSTATIN: 100000 CREAM TOPICAL at 20:45

## 2022-01-01 RX ADMIN — SODIUM CHLORIDE, PRESERVATIVE FREE 40 MG: 5 INJECTION INTRAVENOUS at 11:29

## 2022-01-01 RX ADMIN — SERTRALINE 200 MG: 100 TABLET, FILM COATED ORAL at 08:15

## 2022-01-01 RX ADMIN — POLYVINYL ALCOHOL 1 DROP: 14 SOLUTION/ DROPS OPHTHALMIC at 23:04

## 2022-01-01 RX ADMIN — CHLOROTHIAZIDE SODIUM 500 MG: 500 INJECTION INTRAVENOUS at 12:05

## 2022-01-01 RX ADMIN — HALOPERIDOL 5 MG: 5 TABLET ORAL at 08:43

## 2022-01-01 RX ADMIN — GABAPENTIN 600 MG: 300 CAPSULE ORAL at 21:07

## 2022-01-01 RX ADMIN — CEFTRIAXONE SODIUM 1000 MG: 1 INJECTION, POWDER, FOR SOLUTION INTRAMUSCULAR; INTRAVENOUS at 22:02

## 2022-01-01 RX ADMIN — SODIUM ZIRCONIUM CYCLOSILICATE 10 G: 10 POWDER, FOR SUSPENSION ORAL at 22:54

## 2022-01-01 RX ADMIN — ENOXAPARIN SODIUM 30 MG: 100 INJECTION SUBCUTANEOUS at 08:38

## 2022-01-01 RX ADMIN — INSULIN LISPRO 1 UNITS: 100 INJECTION, SOLUTION INTRAVENOUS; SUBCUTANEOUS at 20:26

## 2022-01-01 RX ADMIN — POLYVINYL ALCOHOL 1 DROP: 14 SOLUTION/ DROPS OPHTHALMIC at 08:47

## 2022-01-01 RX ADMIN — NYSTATIN: 100000 CREAM TOPICAL at 09:34

## 2022-01-01 RX ADMIN — ATROPINE SULFATE: 0.1 INJECTION, SOLUTION INTRAVENOUS at 08:50

## 2022-01-01 RX ADMIN — INSULIN LISPRO 2 UNITS: 100 INJECTION, SOLUTION INTRAVENOUS; SUBCUTANEOUS at 16:19

## 2022-01-01 RX ADMIN — FUROSEMIDE 40 MG: 10 INJECTION, SOLUTION INTRAMUSCULAR; INTRAVENOUS at 18:31

## 2022-01-01 RX ADMIN — SERTRALINE 200 MG: 100 TABLET, FILM COATED ORAL at 08:38

## 2022-01-01 RX ADMIN — Medication 10 ML: at 08:41

## 2022-01-01 RX ADMIN — ONDANSETRON 4 MG: 2 INJECTION INTRAMUSCULAR; INTRAVENOUS at 14:37

## 2022-01-01 RX ADMIN — MECLIZINE HYDROCHLORIDE 25 MG: 25 TABLET ORAL at 08:15

## 2022-01-01 RX ADMIN — DICYCLOMINE HYDROCHLORIDE 20 MG: 20 INJECTION INTRAMUSCULAR at 12:56

## 2022-01-01 RX ADMIN — INSULIN LISPRO 1 UNITS: 100 INJECTION, SOLUTION INTRAVENOUS; SUBCUTANEOUS at 04:02

## 2022-01-01 RX ADMIN — SENNOSIDES AND DOCUSATE SODIUM 2 TABLET: 50; 8.6 TABLET ORAL at 22:01

## 2022-01-01 RX ADMIN — POLYVINYL ALCOHOL 1 DROP: 14 SOLUTION/ DROPS OPHTHALMIC at 21:10

## 2022-01-01 RX ADMIN — Medication 10 ML: at 10:10

## 2022-01-01 RX ADMIN — MORPHINE SULFATE 1 MG: 2 INJECTION, SOLUTION INTRAMUSCULAR; INTRAVENOUS at 08:48

## 2022-01-01 RX ADMIN — METOPROLOL TARTRATE 5 MG: 1 INJECTION, SOLUTION INTRAVENOUS at 00:04

## 2022-01-01 RX ADMIN — DOXEPIN HYDROCHLORIDE 150 MG: 75 CAPSULE ORAL at 20:31

## 2022-01-01 RX ADMIN — ENOXAPARIN SODIUM 30 MG: 100 INJECTION SUBCUTANEOUS at 08:11

## 2022-01-01 RX ADMIN — PROPOFOL 20 MCG/KG/MIN: 10 INJECTION, EMULSION INTRAVENOUS at 02:39

## 2022-01-01 RX ADMIN — INSULIN LISPRO 1 UNITS: 100 INJECTION, SOLUTION INTRAVENOUS; SUBCUTANEOUS at 21:34

## 2022-01-01 RX ADMIN — HEPARIN SODIUM 2000 UNITS: 1000 INJECTION INTRAVENOUS; SUBCUTANEOUS at 04:18

## 2022-01-01 ASSESSMENT — PULMONARY FUNCTION TESTS
PIF_VALUE: 35
PIF_VALUE: 31
PIF_VALUE: 32
PIF_VALUE: 27
PIF_VALUE: 15
PIF_VALUE: 35
PIF_VALUE: 36
PIF_VALUE: 35
PIF_VALUE: 35
PIF_VALUE: 2
PIF_VALUE: 28
PIF_VALUE: 3
PIF_VALUE: 32
PIF_VALUE: 35
PIF_VALUE: 8
PIF_VALUE: 26
PIF_VALUE: 10
PIF_VALUE: 37
PIF_VALUE: 35
PIF_VALUE: 10
PIF_VALUE: 35
PIF_VALUE: 32
PIF_VALUE: 27
PIF_VALUE: 8
PIF_VALUE: 35
PIF_VALUE: 2
PIF_VALUE: 11
PIF_VALUE: 9
PIF_VALUE: 1
PIF_VALUE: 32
PIF_VALUE: 32
PIF_VALUE: 33
PIF_VALUE: 35
PIF_VALUE: 27
PIF_VALUE: 33
PIF_VALUE: 27
PIF_VALUE: 16
PIF_VALUE: 15
PIF_VALUE: 36
PIF_VALUE: 28
PIF_VALUE: 35
PIF_VALUE: 36
PIF_VALUE: 35
PIF_VALUE: 32
PIF_VALUE: 37
PIF_VALUE: 35
PIF_VALUE: 35
PIF_VALUE: 27
PIF_VALUE: 8
PIF_VALUE: 35
PIF_VALUE: 35
PIF_VALUE: 2
PIF_VALUE: 38
PIF_VALUE: 32
PIF_VALUE: 10
PIF_VALUE: 35
PIF_VALUE: 35
PIF_VALUE: 27
PIF_VALUE: 36
PIF_VALUE: 20
PIF_VALUE: 15
PIF_VALUE: 27
PIF_VALUE: 30
PIF_VALUE: 29
PIF_VALUE: 27
PIF_VALUE: 27
PIF_VALUE: 33
PIF_VALUE: 11
PIF_VALUE: 11
PIF_VALUE: 5
PIF_VALUE: 11
PIF_VALUE: 35
PIF_VALUE: 28
PIF_VALUE: 25
PIF_VALUE: 14
PIF_VALUE: 27
PIF_VALUE: 37
PIF_VALUE: 33
PIF_VALUE: 2
PIF_VALUE: 10
PIF_VALUE: 32
PIF_VALUE: 35
PIF_VALUE: 8
PIF_VALUE: 26
PIF_VALUE: 27
PIF_VALUE: 15
PIF_VALUE: 35
PIF_VALUE: 35
PIF_VALUE: 2
PIF_VALUE: 35
PIF_VALUE: 25
PIF_VALUE: 35
PIF_VALUE: 10
PIF_VALUE: 5
PIF_VALUE: 8
PIF_VALUE: 10
PIF_VALUE: 36
PIF_VALUE: 35
PIF_VALUE: 18
PIF_VALUE: 7
PIF_VALUE: 35
PIF_VALUE: 16
PIF_VALUE: 32
PIF_VALUE: 36
PIF_VALUE: 35
PIF_VALUE: 10
PIF_VALUE: 26
PIF_VALUE: 35
PIF_VALUE: 11
PIF_VALUE: 37
PIF_VALUE: 8
PIF_VALUE: 35
PIF_VALUE: 27
PIF_VALUE: 27
PIF_VALUE: 9
PIF_VALUE: 36
PIF_VALUE: 35
PIF_VALUE: 30
PIF_VALUE: 28
PIF_VALUE: 28
PIF_VALUE: 36
PIF_VALUE: 21
PIF_VALUE: 35
PIF_VALUE: 34
PIF_VALUE: 35
PIF_VALUE: 36
PIF_VALUE: 37
PIF_VALUE: 28
PIF_VALUE: 27
PIF_VALUE: 35
PIF_VALUE: 2
PIF_VALUE: 28
PIF_VALUE: 35
PIF_VALUE: 27
PIF_VALUE: 35
PIF_VALUE: 33
PIF_VALUE: 15
PIF_VALUE: 35
PIF_VALUE: 35
PIF_VALUE: 28
PIF_VALUE: 8
PIF_VALUE: 26
PIF_VALUE: 26
PIF_VALUE: 35
PIF_VALUE: 35
PIF_VALUE: 36
PIF_VALUE: 35
PIF_VALUE: 36
PIF_VALUE: 35
PIF_VALUE: 16
PIF_VALUE: 26
PIF_VALUE: 11
PIF_VALUE: 28
PIF_VALUE: 34
PIF_VALUE: 38
PIF_VALUE: 35
PIF_VALUE: 35
PIF_VALUE: 36
PIF_VALUE: 29
PIF_VALUE: 37
PIF_VALUE: 10
PIF_VALUE: 35
PIF_VALUE: 38
PIF_VALUE: 27
PIF_VALUE: 26
PIF_VALUE: 35
PIF_VALUE: 27

## 2022-01-01 ASSESSMENT — ENCOUNTER SYMPTOMS
ABDOMINAL DISTENTION: 0
CHOKING: 0
BACK PAIN: 0
SHORTNESS OF BREATH: 0
VOMITING: 0
SHORTNESS OF BREATH: 0
VOMITING: 1
DIARRHEA: 0
EYE DISCHARGE: 0
TROUBLE SWALLOWING: 0
EYE ITCHING: 0
BACK PAIN: 0
NAUSEA: 1
NAUSEA: 1
VOMITING: 0
BLOOD IN STOOL: 0
WHEEZING: 0
WHEEZING: 0
ABDOMINAL DISTENTION: 0
DIARRHEA: 0
CHEST TIGHTNESS: 0
TROUBLE SWALLOWING: 0
EYE PAIN: 0
EYE DISCHARGE: 0
NAUSEA: 0
ABDOMINAL DISTENTION: 0
CHEST TIGHTNESS: 0
WHEEZING: 0
ABDOMINAL PAIN: 1
TROUBLE SWALLOWING: 0
TROUBLE SWALLOWING: 0
SHORTNESS OF BREATH: 0
EYE PAIN: 0
VOICE CHANGE: 0
ANAL BLEEDING: 0
COLOR CHANGE: 0
ABDOMINAL PAIN: 1
BLOOD IN STOOL: 0
VOMITING: 0
COLOR CHANGE: 0
SHORTNESS OF BREATH: 0
SINUS PRESSURE: 0
STRIDOR: 0
VOMITING: 0
VOMITING: 0
CHOKING: 0
SORE THROAT: 0
DIARRHEA: 0
CHEST TIGHTNESS: 0
VOMITING: 1
STRIDOR: 0
DIARRHEA: 0
SHORTNESS OF BREATH: 0
VOMITING: 0
COUGH: 1
DIARRHEA: 0
SHORTNESS OF BREATH: 0
SHORTNESS OF BREATH: 0
WHEEZING: 0
STRIDOR: 0
BLOOD IN STOOL: 0
ABDOMINAL PAIN: 1
COUGH: 0
VOMITING: 0
DIARRHEA: 0
APNEA: 0
DIARRHEA: 0
COUGH: 0
BACK PAIN: 0
RECTAL PAIN: 0
NAUSEA: 0
EYE DISCHARGE: 0
APNEA: 0
COUGH: 0
APNEA: 0
EYE DISCHARGE: 0
DIARRHEA: 0
SORE THROAT: 0
NAUSEA: 0
COLOR CHANGE: 0
NAUSEA: 1
NAUSEA: 0
CONSTIPATION: 0
COLOR CHANGE: 0
SORE THROAT: 0
VOMITING: 1
VOMITING: 0
SHORTNESS OF BREATH: 0
ABDOMINAL PAIN: 0
SORE THROAT: 0
SORE THROAT: 0
COLOR CHANGE: 0
VOMITING: 0
TROUBLE SWALLOWING: 0
ABDOMINAL DISTENTION: 0
RECTAL PAIN: 0
VOMITING: 0
VOMITING: 0
COLOR CHANGE: 0
ABDOMINAL PAIN: 0
VOICE CHANGE: 0
TROUBLE SWALLOWING: 0
ABDOMINAL PAIN: 1
COUGH: 0
SHORTNESS OF BREATH: 1
CONSTIPATION: 0
ALLERGIC/IMMUNOLOGIC NEGATIVE: 1
RHINORRHEA: 0
PHOTOPHOBIA: 0
TROUBLE SWALLOWING: 0
SHORTNESS OF BREATH: 0
BACK PAIN: 0
COUGH: 0
ANAL BLEEDING: 0
COUGH: 0
COUGH: 0
SHORTNESS OF BREATH: 0
WHEEZING: 0
DIARRHEA: 0
VOICE CHANGE: 0
DIARRHEA: 0
CHOKING: 0
ABDOMINAL PAIN: 1
CHOKING: 0
CONSTIPATION: 0
APNEA: 0
SORE THROAT: 0
NAUSEA: 1
VOICE CHANGE: 0
SHORTNESS OF BREATH: 0
COUGH: 0
SORE THROAT: 0
BACK PAIN: 0
CHEST TIGHTNESS: 0
SHORTNESS OF BREATH: 0
VOMITING: 0
RECTAL PAIN: 0
COUGH: 0
EYE DISCHARGE: 0
COLOR CHANGE: 0
EYES NEGATIVE: 1
CONSTIPATION: 0
CHEST TIGHTNESS: 0
RHINORRHEA: 0
DIARRHEA: 0
ANAL BLEEDING: 0
NAUSEA: 0
COUGH: 0
DIARRHEA: 0
VOMITING: 0
NAUSEA: 1
SHORTNESS OF BREATH: 0
SHORTNESS OF BREATH: 0
COUGH: 0
SHORTNESS OF BREATH: 0
CHEST TIGHTNESS: 0

## 2022-01-01 ASSESSMENT — PAIN DESCRIPTION - DESCRIPTORS
DESCRIPTORS: ACHING
DESCRIPTORS: CRAMPING
DESCRIPTORS: PATIENT UNABLE TO DESCRIBE
DESCRIPTORS: BURNING
DESCRIPTORS: PATIENT UNABLE TO DESCRIBE

## 2022-01-01 ASSESSMENT — PAIN DESCRIPTION - LOCATION
LOCATION: ABDOMEN
LOCATION: OTHER (COMMENT)

## 2022-01-01 ASSESSMENT — PAIN SCALES - GENERAL
PAINLEVEL_OUTOF10: 0
PAINLEVEL_OUTOF10: 0
PAINLEVEL_OUTOF10: 6
PAINLEVEL_OUTOF10: 7
PAINLEVEL_OUTOF10: 0
PAINLEVEL_OUTOF10: 0
PAINLEVEL_OUTOF10: 6
PAINLEVEL_OUTOF10: 9
PAINLEVEL_OUTOF10: 10
PAINLEVEL_OUTOF10: 10
PAINLEVEL_OUTOF10: 0
PAINLEVEL_OUTOF10: 9
PAINLEVEL_OUTOF10: 9
PAINLEVEL_OUTOF10: 8
PAINLEVEL_OUTOF10: 10
PAINLEVEL_OUTOF10: 0
PAINLEVEL_OUTOF10: 10
PAINLEVEL_OUTOF10: 0
PAINLEVEL_OUTOF10: 5
PAINLEVEL_OUTOF10: 0
PAINLEVEL_OUTOF10: 0
PAINLEVEL_OUTOF10: 8
PAINLEVEL_OUTOF10: 0
PAINLEVEL_OUTOF10: 8
PAINLEVEL_OUTOF10: 10
PAINLEVEL_OUTOF10: 4
PAINLEVEL_OUTOF10: 9
PAINLEVEL_OUTOF10: 10
PAINLEVEL_OUTOF10: 0
PAINLEVEL_OUTOF10: 10
PAINLEVEL_OUTOF10: 0
PAINLEVEL_OUTOF10: 10
PAINLEVEL_OUTOF10: 8
PAINLEVEL_OUTOF10: 7
PAINLEVEL_OUTOF10: 0
PAINLEVEL_OUTOF10: 10
PAINLEVEL_OUTOF10: 0
PAINLEVEL_OUTOF10: 10
PAINLEVEL_OUTOF10: 0
PAINLEVEL_OUTOF10: 0
PAINLEVEL_OUTOF10: 8
PAINLEVEL_OUTOF10: 0
PAINLEVEL_OUTOF10: 9
PAINLEVEL_OUTOF10: 10
PAINLEVEL_OUTOF10: 6
PAINLEVEL_OUTOF10: 4
PAINLEVEL_OUTOF10: 0
PAINLEVEL_OUTOF10: 7
PAINLEVEL_OUTOF10: 0
PAINLEVEL_OUTOF10: 3
PAINLEVEL_OUTOF10: 0
PAINLEVEL_OUTOF10: 7
PAINLEVEL_OUTOF10: 6
PAINLEVEL_OUTOF10: 8
PAINLEVEL_OUTOF10: 4
PAINLEVEL_OUTOF10: 5
PAINLEVEL_OUTOF10: 0

## 2022-01-01 ASSESSMENT — LIFESTYLE VARIABLES
HOW OFTEN DO YOU HAVE A DRINK CONTAINING ALCOHOL: NEVER
HOW OFTEN DO YOU HAVE A DRINK CONTAINING ALCOHOL: NEVER

## 2022-01-01 ASSESSMENT — PAIN - FUNCTIONAL ASSESSMENT
PAIN_FUNCTIONAL_ASSESSMENT: 0-10

## 2022-01-01 ASSESSMENT — PAIN DESCRIPTION - ORIENTATION
ORIENTATION: RIGHT;UPPER
ORIENTATION: UPPER
ORIENTATION: RIGHT;UPPER
ORIENTATION: RIGHT
ORIENTATION: RIGHT;LOWER;MID
ORIENTATION: OTHER (COMMENT)
ORIENTATION: UPPER
ORIENTATION: RIGHT;POSTERIOR
ORIENTATION: MID

## 2022-01-01 ASSESSMENT — PAIN DESCRIPTION - PAIN TYPE
TYPE: SURGICAL PAIN
TYPE: ACUTE PAIN
TYPE: ACUTE PAIN
TYPE: SURGICAL PAIN
TYPE: ACUTE PAIN
TYPE: ACUTE PAIN
TYPE: SURGICAL PAIN

## 2022-01-01 ASSESSMENT — PAIN DESCRIPTION - DIRECTION: RADIATING_TOWARDS: R LOWER BACK

## 2022-01-01 ASSESSMENT — PAIN DESCRIPTION - FREQUENCY
FREQUENCY: CONTINUOUS
FREQUENCY: CONTINUOUS

## 2022-01-01 ASSESSMENT — PAIN SCALES - WONG BAKER: WONGBAKER_NUMERICALRESPONSE: 6

## 2022-03-30 NOTE — PROGRESS NOTES
3/30/2022    Assessment:       Diagnosis Orders   1. Type 2 diabetes mellitus with hyperglycemia, with long-term current use of insulin (HCC)  POCT Glucose   2.  Hypothyroidism, unspecified type           PLAN:     Orders Placed This Encounter   Procedures    T4, Free     Standing Status:   Future     Standing Expiration Date:   3/30/2023    TSH with Reflex     Standing Status:   Future     Standing Expiration Date:   3/30/2023    Hemoglobin A1C     Standing Status:   Future     Standing Expiration Date:   3/30/2023    Basic Metabolic Panel, Fasting     Standing Status:   Future     Standing Expiration Date:   3/30/2023    POCT Glucose     Increase Lantus to 60 units at bedtime continue Humalog 15 units with each meals  Add Trulicity  Continue current low-dose Synthroid  A1c goal of 7 or lower  Orders Placed This Encounter   Medications    Continuous Blood Gluc Sensor (VoiceObjectsSTYLE FELY 14 DAY SENSOR) MISC     Sig: Every 2 weeks     Dispense:  2 each     Refill:  06    insulin glargine (LANTUS SOLOSTAR) 100 UNIT/ML injection pen     Si units at bedtime     Dispense:  15 pen     Refill:  3    insulin lispro, 1 Unit Dial, (HUMALOG KWIKPEN) 100 UNIT/ML SOPN     Sig: 15  units at each meals     Dispense:  10 pen     Refill:  03    Dulaglutide (TRULICITY) 0.81 NI/0.4WH SOPN     Sig: Inject 0.75 mg into the skin once a week     Dispense:  4 pen     Refill:  3    levothyroxine (SYNTHROID) 50 MCG tablet     Sig: take 1 tablet by mouth once daily     Dispense:  30 tablet     Refill:  5         Orders Placed This Encounter   Procedures    POCT Glucose     Subjective:     Chief Complaint   Patient presents with    Diabetes    Hypothyroidism     Vitals:    22 1258   BP: 105/69   Pulse: 81   SpO2: 92%   Weight: 224 lb (101.6 kg)   Height: 5' 4\" (1.626 m)     Wt Readings from Last 3 Encounters:   22 224 lb (101.6 kg)   21 227 lb (103 kg)   12/10/21 235 lb 9.6 oz (106.9 kg)     BP Readings from Last 3 Encounters:   22 105/69   21 (!) 148/70   12/10/21 116/65     Follow-up regarding diabetes patient on Lantus and Humalog A1c has gone up also blood sugars have been higher in the morning uses freestyle malu did not bring the reader today  Wants to start Trulicity  Complications include heart disease and chronic kidney disease  Hemoglobin A1c was 7.6  Thyroid function test stable on replacement  History done with the help of     Diabetes  She presents for her follow-up diabetic visit. She has type 2 diabetes mellitus. Her disease course has been fluctuating. Associated symptoms include fatigue. Pertinent negatives for diabetes include no polydipsia and no polyuria. Diabetic complications include heart disease, nephropathy and PVD. Risk factors for coronary artery disease include obesity. Current diabetic treatment includes insulin injections. She is currently taking insulin pre-breakfast, pre-lunch, pre-dinner and at bedtime. Her overall blood glucose range is 180-200 mg/dl.  (Lab Results       Component                Value               Date                       LABA1C                   7.6 (H)             2022            )     Past Medical History:   Diagnosis Date    Asthma     CAD (coronary artery disease)     CKD (chronic kidney disease) stage 3, GFR 30-59 ml/min ([facility])     Colitis     Diabetes mellitus ([address]     Hyperlipidemia     Hypertension     PAD (peripheral artery disease) ([facility])     Prolonged emergence from general anesthesia     PVD (peripheral vascular disease) ([address]     Thyroid disease      Past Surgical History:   Procedure Laterality Date    CARDIAC SURGERY      CATARACT REMOVAL WITH IMPLANT Bilateral 11- 11-     SECTION      COLONOSCOPY N/A 2019    COLONOSCOPY DIAGNOSTIC performed by Teresa Shane MD at [address] [place] [address] GRAFT  2019    unknown vessels    HYSTERECTOMY      TOE AMPUTATION Right     3rd toe     Social History     Socioeconomic History    Marital status:      Spouse name: Not on file    Number of children: Not on file    Years of education: Not on file    Highest education level: Not on file   Occupational History    Not on file   Tobacco Use    Smoking status: Never Smoker    Smokeless tobacco: Never Used   Vaping Use    Vaping Use: Never used   Substance and Sexual Activity    Alcohol use: Never    Drug use: Never    Sexual activity: Not on file   Other Topics Concern    Not on file   Social History Narrative         Lives With: Spouse    Type of Home: [address] [place] [address] [address]: One level    Home Access: Elevator    Bathroom Shower/Tub: Tub/Shower unit(Simultaneous filing. User may not have seen previous data.)    Bathroom Equipment: Grab bars in shower, Shower chair    Home Equipment: Rolling walker, [address] bed    ADL Assistance: Needs assistance    Homemaking Assistance: Needs assistance    Homemaking Responsibilities: No    Ambulation Assistance: Independent    Transfer Assistance: Independent    Active : No    Additional Comments: Pt wears orthopedic shoes at baseline    The patient states she is current with [facility](RN per the ). The patient had a walker, but obtained a hospital bed, wheel chair, BSC and O2 last admission (from [address] [place] [address]). pharmacy is [address] [place] [address],[address] [address]., [facility]. Transportation is provided by [employer]	[facility] () per the patient     Social Determinants of Health     Financial Resource Strain:     Difficulty of Paying Living Expenses: Not on file   Food Insecurity:     Worried About [address] in the Last Year: Not on file    Shayy of Food in the Last Year: Not on file   Transportation Needs:     Lack of Transportation (Medical): Not on file    Lack of Transportation (Non-Medical):  Not on file Physical Activity:     Days of Exercise per Week: Not on file    Minutes of Exercise per Session: Not on file   Stress:     Feeling of Stress : Not on file   Social Connections:     Frequency of Communication with Friends and Family: Not on file    Frequency of Social Gatherings with Friends and Family: Not on file    Attends Caodaism Services: Not on file    Active Member of Clubs or Organizations: Not on file    Attends Club or Organization Meetings: Not on file    Marital Status: Not on file   Intimate Partner Violence:     Fear of Current or Ex-Partner: Not on file    Emotionally Abused: Not on file    Physically Abused: Not on file    Sexually Abused: Not on file   Housing Stability:     Unable to Pay for Housing in the Last Year: Not on file    Number of [place] in the Last Year: Not on file    Unstable Housing in the Last Year: Not on file     No family history on file.   Allergies   Allergen Reactions    Ambien [Zolpidem Tartrate]     Capoten [Captopril]     Clioquinol     Cogentin [Benztropine]     Depakote [Divalproex Sodium]     Effexor Xr [Venlafaxine Hcl Er]     Geodon [Ziprasidone Hcl]     Lisinopril      Hyperkalemia: 4/21/20 potassium was 6.7    Lyrica [Pregabalin]     Navane [Thiothixene]     Pamelor [Nortriptyline Hcl]     Remeron [Mirtazapine]     Risperdal [Risperidone]     Trazodone And Nefazodone     Wellbutrin [Bupropion]        Current Outpatient Medications:     oxybutynin (DITROPAN-XL) 5 MG extended release tablet, take 1 tablet by mouth once daily, Disp: 90 tablet, Rfl: 3    atorvastatin (LIPITOR) 40 MG tablet, take 2 tablets by mouth daily, Disp: , Rfl:     busPIRone (BUSPAR) 10 MG tablet, , Disp: , Rfl:     doxepin (SINEQUAN) 100 MG capsule, , Disp: , Rfl:     prazosin (MINIPRESS) 2 MG capsule, , Disp: , Rfl:     Alcohol Swabs (ALCOHOL PREP) 70 % PADS, qid, Disp: 300 each, Rfl: 06    blood glucose test strips (FREESTYLE LITE) strip, 1 each by In Vitro route daily As needed. , Disp: 100 each, Rfl: 3    Continuous Blood Gluc Sensor (FREESTYLE FELY 14 DAY SENSOR) MISC, Every 2 weeks, Disp: 2 each, Rfl: 06    Continuous Blood Gluc  (FREESTYLE FELY 14 DAY READER) CATHLEEN, As directed, Disp: 2 each, Rfl: 3    Dulaglutide (TRULICITY) 9.57 DC/5.1KO SOPN, Inject 0.75 mg into the skin once a week, Disp: 4 pen, Rfl: 3    FreeStyle Lancets MISC, 1 each by Does not apply route daily, Disp: 100 each, Rfl: 3    insulin glargine (LANTUS SOLOSTAR) 100 UNIT/ML injection pen, 50 units at bedtime, Disp: 15 pen, Rfl: 3    insulin lispro, 1 Unit Dial, (HUMALOG KWIKPEN) 100 UNIT/ML SOPN, 15  units at each meals, Disp: 10 pen, Rfl: 03    Insulin Pen Needle ([employer] PEN NEEDLES 31G) 31G X 8 MM MISC, 1 each by Does not apply route 4 times daily, Disp: 300 each, Rfl: 3    levothyroxine (SYNTHROID) 50 MCG tablet, take 1 tablet by mouth once daily, Disp: 30 tablet, Rfl: 5    albuterol sulfate HFA (VENTOLIN HFA) 108 (90 Base) MCG/ACT inhaler, Inhale 2 puffs into the lungs as needed for Wheezing Every 4-6 hours PRN, Disp: 1 each, Rfl: 3    montelukast (SINGULAIR) 10 MG tablet, Take 1 tablet by mouth nightly, Disp: 30 tablet, Rfl: 3    oxyCODONE-acetaminophen (PERCOCET) 5-325 MG per tablet, Take 1 tablet by mouth every 4 hours as needed for Pain., Disp: , Rfl:     Insulin Pen Needle (NOVOFINE) 32G X 6 MM MISC, qid, Disp: 300 each, Rfl: 3    ammonium lactate (LAC-HYDRIN) 12 % lotion, , Disp: , Rfl:     atorvastatin (LIPITOR) 80 MG tablet, take 1 tablet by mouth at bedtime, Disp: , Rfl:     diclofenac sodium (VOLTAREN) 1 % GEL, apply 4 grams to affected area three times a day AS NEEDED FOR PAIN, Disp: , Rfl:     doxepin (SINEQUAN) 25 MG capsule, 75 mg nightly , Disp: , Rfl:     ondansetron (ZOFRAN-ODT) 4 MG disintegrating tablet, Take 1 tablet by mouth 3 times daily as needed for Nausea or Vomiting, Disp: 21 tablet, Rfl: 0    ranolazine (RANEXA) 1000 MG extended release tablet, Take 1 tablet by mouth 2 times daily, Disp: 60 tablet, Rfl: 3    gabapentin (NEURONTIN) 600 MG tablet, Take 600 mg by mouth 2 times daily. , Disp: , Rfl:     Continuous Blood Gluc Sensor (FREESTYLE FELY 14 DAY SENSOR) MISC, Every 2 weeks, Disp: 2 each, Rfl: 06    RESTASIS 0.05 % ophthalmic emulsion, , Disp: , Rfl:     neomycin-polymyxin-dexameth (MAXITROL) 3.5-81313-7.1 ophthalmic suspension, instill 1 drop into both eyes four times a day, Disp: , Rfl:     ARTIFICIAL TEARS 1.4 % ophthalmic solution, , Disp: , Rfl:     docusate sodium (COLACE, DULCOLAX) 100 MG CAPS, Take 100 mg by mouth 2 times daily (Patient taking differently: Take 200 mg by mouth daily At bedtime.), Disp: 60 capsule, Rfl: 1    sertraline (ZOLOFT) 100 MG tablet, Take 200 mg by mouth daily , Disp: , Rfl:     furosemide (LASIX) 40 MG tablet, Take 1 tablet by mouth daily, Disp: 60 tablet, Rfl: 3    ticagrelor (BRILINTA) 90 MG TABS tablet, Take 90 mg by mouth 2 times daily, Disp: , Rfl:     CPAP Machine MISC, by Does not apply route New CPAP with 10 cm, Disp: 1 each, Rfl: 0    aspirin 81 MG EC tablet, Take 1 tablet by mouth daily, Disp: 30 tablet, Rfl: 3    OXYGEN, 2 lit O2 with sleep , please give O2 concentrator, Disp: 1 Units, Rfl: 0    Emollient (EUCERIN INTENSIVE REPAIR HAND) 2.5-10 % CREA, APPLY TO FEET AT BEDTIME; PLACE SOCKS OVER FEET AFTER APPLICATION IF NEEDED FOR DRY CRACKING FEET, Disp: , Rfl:     hydrOXYzine (VISTARIL) 25 MG capsule, Take 50 mg by mouth 3 times daily as needed , Disp: , Rfl:     Nutritional Supplements (GLUCERNA SHAKE) LIQD, take as directed three times a day, Disp: , Rfl:     isosorbide dinitrate (ISORDIL) 20 MG tablet, Take 1 tablet by mouth 3 times daily, Disp: 90 tablet, Rfl: 3    nitroGLYCERIN (NITROSTAT) 0.4 MG SL tablet, up to max of 3 total doses.  If no relief after 1 dose, call 911., Disp: 25 tablet, Rfl: 3    hydrALAZINE (APRESOLINE) 50 MG tablet, Take 1 tablet by mouth every 8 hours, Disp: 90 tablet, Rfl: 3    metoprolol succinate (TOPROL XL) 50 MG extended release tablet, Take 1 tablet by mouth 2 times daily, Disp: 30 tablet, Rfl: 3    haloperidol (HALDOL) 5 MG tablet, Take 5 mg by mouth daily, Disp: , Rfl:     folic acid (FOLVITE) 1 MG tablet, Take 1 mg by mouth daily, Disp: , Rfl:     Iron Polysacch [id number] (NIFEREX-150 FORTE PO), Take 1 tablet by mouth daily , Disp: , Rfl:     Cyanocobalamin (VITAMIN B-12) 1000 MCG extended release tablet, Take 1,000 mcg by mouth daily, Disp: , Rfl:     meclizine (ANTIVERT) 25 MG tablet, Take 25 mg by mouth 2 times daily , Disp: , Rfl:     albuterol (PROVENTIL) (2.5 MG/3ML) 0.083% nebulizer solution, Take 3 mLs by nebulization every 6 hours as needed for Wheezing, Disp: 120 each, Rfl: 3  Lab Results   Component Value Date     03/09/2022    K 4.1 03/09/2022    CL 99 03/09/2022    CO2 24 03/09/2022    BUN 22 03/09/2022    CREATININE 1.14 (H) 03/09/2022    GLUCOSE 320 03/30/2022    CALCIUM 8.8 03/09/2022    PROT 7.5 03/09/2022    LABALBU 3.6 03/09/2022    BILITOT 0.5 03/09/2022    ALKPHOS 93 03/09/2022    AST 16 03/09/2022    ALT 11 03/09/2022    LABGLOM 47.9 (L) 03/09/2022    GFRAA 58.0 (L) 03/09/2022    GLOB 4.5 (H) 11/30/2021     Lab Results   Component Value Date    WBC 8.7 03/09/2022    HGB 11.7 (L) 03/09/2022    HCT 35.2 (L) 03/09/2022    MCV 73.7 (L) 03/09/2022     03/09/2022     Lab Results   Component Value Date    LABA1C 7.6 (H) 03/09/2022    LABA1C 7.3 (H) 12/08/2021    LABA1C 7.2 (H) 09/09/2021     Lab Results   Component Value Date    HDL 41 03/09/2022    HDL 45 02/09/2022    HDL 44 12/08/2021    LDLCALC 55 03/09/2022    LDLCALC 51 02/09/2022    LDLCALC 63 12/08/2021    CHOL 116 03/09/2022    CHOL 116 02/09/2022    CHOL 122 12/08/2021    TRIG 102 03/09/2022    TRIG 100 02/09/2022    TRIG 74 12/08/2021     No results found for: TESTM  Lab Results   Component Value Date    TSH 1.470 07/01/2021    TSH 1.390 04/06/2021 TSH 1.420 12/01/2020    TSHREFLEX 2.510 03/09/2022    TSHREFLEX 3.670 12/08/2021    TSHREFLEX 1.970 09/09/2021    T4FREE 1.72 (H) 03/09/2022    T4FREE 1.45 12/08/2021    T4FREE 1.71 (H) 09/09/2021     No results found for: TPOABS    Review of Systems   Constitutional: Positive for fatigue. Eyes: Negative. Cardiovascular: Negative. Endocrine: Negative. Negative for polydipsia and polyuria. Psychiatric/Behavioral: Positive for dysphoric mood. Objective:   Physical Exam  Vitals reviewed. Constitutional:       Appearance: Normal appearance. She is obese. HENT:      Head: Normocephalic and atraumatic. Right Ear: External ear normal.      Left Ear: External ear normal.      Nose: Nose normal.   Eyes:      General: No scleral icterus. Right eye: No discharge. Left eye: No discharge. Extraocular Movements: Extraocular movements intact. Conjunctiva/sclera: Conjunctivae normal.   Cardiovascular:      Rate and Rhythm: Normal rate. Pulmonary:      Effort: Pulmonary effort is normal.   Musculoskeletal:         General: Normal range of motion. Cervical back: Normal range of motion and neck supple. Neurological:      General: No focal deficit present. Mental Status: She is alert and oriented to person, place, and time.    Psychiatric:         Mood and Affect: Mood normal.         Behavior: Behavior normal.

## 2022-04-11 NOTE — PROGRESS NOTES
Subjective:     Payal Menon is a 61 y.o. female who complains today of:     Chief Complaint   Patient presents with    Sleep Apnea     4 month f/u       HPI  She is using CPAP with  10  centimeters of H2O with heated humidity and 3 lit O2 . She is using CPAP for about  3-4  hours but not every night. She is using CPAP with   Full face Mask. She said  sleep is restful with the CPAP use. She is not compliant with CPAP therapy. No snoring with CPAP use. I reviewed compliance report with patient regarding CPAP therapy. She is using  CPAP for 20 days out of 30 days. Average usage of days used is 3  hours and 50 min , average AHI 13 with CPAP use. Pt on 3L O2 at home at baseline. C/o shortness of breath  with exertion. No  Wheezing. C/o Cough mostly dry. No Chest pain with radiation  or pleuritic pain. C/o  leg edema. No orthopnea. No Fever or chills. No Rhinorrhea and postnasal drip. following  With cardiology.       Allergies:  Ambien [zolpidem tartrate], Capoten [captopril], Clioquinol, Cogentin [benztropine], Depakote [divalproex sodium], Effexor xr [venlafaxine hcl er], Geodon [ziprasidone hcl], Lisinopril, Lyrica [pregabalin], Navane [thiothixene], Pamelor [nortriptyline hcl], Remeron [mirtazapine], Risperdal [risperidone], Trazodone and nefazodone, and Wellbutrin [bupropion]  Past Medical History:   Diagnosis Date    Asthma     CAD (coronary artery disease)     CKD (chronic kidney disease) stage 3, GFR 30-59 ml/min ([facility])     Colitis     Diabetes mellitus ([address]     Hyperlipidemia     Hypertension     PAD (peripheral artery disease) ([address]     Prolonged emergence from general anesthesia     PVD (peripheral vascular disease) ([address]     Thyroid disease      Past Surgical History:   Procedure Laterality Date    CARDIAC SURGERY      CATARACT REMOVAL WITH IMPLANT Bilateral 11- 11-     SECTION      COLONOSCOPY N/A 2019    COLONOSCOPY DIAGNOSTIC performed by Jennifer Noe MD at [facility]  06/2019    unknown vessels    HYSTERECTOMY      TOE AMPUTATION Right     3rd toe     No family history on file. Social History     Socioeconomic History    Marital status:      Spouse name: Not on file    Number of children: Not on file    Years of education: Not on file    Highest education level: Not on file   Occupational History    Not on file   Tobacco Use    Smoking status: Never Smoker    Smokeless tobacco: Never Used   Vaping Use    Vaping Use: Never used   Substance and Sexual Activity    Alcohol use: Never    Drug use: Never    Sexual activity: Not on file   Other Topics Concern    Not on file   Social History Narrative         Lives With: Spouse    Type of Home: [address] [place] [address] [address]: One level    Home Access: Elevator    Bathroom Shower/Tub: Tub/Shower unit(Simultaneous filing. User may not have seen previous data.)    Bathroom Equipment: Grab bars in shower, Shower chair    Home Equipment: Rolling walker, [address] bed    ADL Assistance: Needs assistance    Homemaking Assistance: Needs assistance    Homemaking Responsibilities: No    Ambulation Assistance: Independent    Transfer Assistance: Independent    Active : No    Additional Comments: Pt wears orthopedic shoes at baseline    The patient states she is current with [facility](RN per the ). The patient had a walker, but obtained a hospital bed, wheel chair, BSC and O2 last admission (from [address]). pharmacy is [address] [place] [address],[address] [address]., [facility].       Transportation is provided by [employer] () per the patient     Social Determinants of Health     Financial Resource Strain:     Difficulty of Paying Living Expenses: Not on file   Food Insecurity:     Worried About [address] in the Last Year: Not on file   [employer] of Food in the Last Year: Not on file   Transportation Needs:     Lack of Transportation (Medical): Not on file    Lack of Transportation (Non-Medical): Not on file   Physical Activity:     Days of Exercise per Week: Not on file    Minutes of Exercise per Session: Not on file   Stress:     Feeling of Stress : Not on file   Social Connections:     Frequency of Communication with Friends and Family: Not on file    Frequency of Social Gatherings with Friends and Family: Not on file    Attends Mu-ism Services: Not on file    Active Member of [address] [place] [address] or Organizations: Not on file    Attends Club or Organization Meetings: Not on file    Marital Status: Not on file   Intimate Partner Violence:     Fear of Current or Ex-Partner: Not on file    Emotionally Abused: Not on file    Physically Abused: Not on file    Sexually Abused: Not on file   Housing Stability:     Unable to Pay for Housing in the Last Year: Not on file    Number of [place] in the Last Year: Not on file    Unstable Housing in the Last Year: Not on file         Review of Systems   Constitutional: Negative for chills, diaphoresis, fatigue and fever. HENT: Negative for congestion, mouth sores, nosebleeds, postnasal drip, rhinorrhea, sinus pressure, sneezing, sore throat, trouble swallowing and voice change. Eyes: Negative for discharge, itching and visual disturbance. Respiratory: Positive for cough and shortness of breath. Negative for chest tightness and wheezing. Cardiovascular: Positive for leg swelling. Negative for chest pain and palpitations. Gastrointestinal: Negative for abdominal pain, diarrhea, nausea and vomiting. Genitourinary: Negative for difficulty urinating and hematuria. Musculoskeletal: Negative for arthralgias, joint swelling and myalgias. Skin: Negative for rash. Allergic/Immunologic: Negative for environmental allergies and food allergies.    Neurological: Negative for dizziness, tremors, weakness and headaches. Psychiatric/Behavioral: Positive for sleep disturbance. Negative for behavioral problems. :     Vitals:    04/11/22 1131   BP: 139/88   Pulse: 66   SpO2: (!) 88%   Weight: 226 lb (102.5 kg)   Height: 5' 4\" (1.626 m)     Wt Readings from Last 3 Encounters:   04/11/22 226 lb (102.5 kg)   03/30/22 224 lb (101.6 kg)   12/30/21 227 lb (103 kg)         Physical Exam  Constitutional:       General: She is not in acute distress. Appearance: She is well-developed. She is obese. She is not diaphoretic. HENT:      Head: Normocephalic and atraumatic. Nose: Nose normal.   Eyes:      Pupils: Pupils are equal, round, and reactive to light. Neck:      Thyroid: No thyromegaly. Vascular: No JVD. Trachea: No tracheal deviation. Cardiovascular:      Rate and Rhythm: Normal rate and regular rhythm. Heart sounds: No murmur heard. No friction rub. No gallop. Pulmonary:      Effort: No respiratory distress. Breath sounds: No wheezing or rales. Comments: diminished Breath sound bilaterally. Chest:      Chest wall: No tenderness. Abdominal:      General: There is no distension. Tenderness: There is no abdominal tenderness. There is no rebound. Musculoskeletal:         General: Normal range of motion. Right lower leg: Edema present. Left lower leg: Edema present. Lymphadenopathy:      Cervical: No cervical adenopathy. Skin:     General: Skin is warm and dry. Neurological:      Mental Status: She is alert and oriented to person, place, and time.       Coordination: Coordination normal.         Current Outpatient Medications   Medication Sig Dispense Refill    albuterol sulfate HFA (VENTOLIN HFA) 108 (90 Base) MCG/ACT inhaler Inhale 2 puffs into the lungs as needed for Wheezing Every 4-6 hours PRN 1 each 3    albuterol (PROVENTIL) (2.5 MG/3ML) 0.083% nebulizer solution Take 3 mLs by nebulization every 6 hours as needed for Wheezing 120 each 3    montelukast (SINGULAIR) 10 MG tablet Take 1 tablet by mouth nightly 30 tablet 3    Continuous Blood Gluc Sensor (FREESTYLE FELY 14 DAY SENSOR) MISC Every 2 weeks 2 each 06    insulin glargine (LANTUS SOLOSTAR) 100 UNIT/ML injection pen 60 units at bedtime 15 pen 3    insulin lispro, 1 Unit Dial, (HUMALOG KWIKPEN) 100 UNIT/ML SOPN 15  units at each meals 10 pen 03    Dulaglutide (TRULICITY) 8.99 KZ/9.7AU SOPN Inject 0.75 mg into the skin once a week 4 pen 3    levothyroxine (SYNTHROID) 50 MCG tablet take 1 tablet by mouth once daily 30 tablet 5    oxybutynin (DITROPAN-XL) 5 MG extended release tablet take 1 tablet by mouth once daily 90 tablet 3    atorvastatin (LIPITOR) 40 MG tablet take 2 tablets by mouth daily      busPIRone (BUSPAR) 10 MG tablet       doxepin (SINEQUAN) 100 MG capsule       prazosin (MINIPRESS) 2 MG capsule       Alcohol Swabs (ALCOHOL PREP) 70 % PADS qid 300 each 06    blood glucose test strips (FREESTYLE LITE) strip 1 each by In Vitro route daily As needed. 100 each 3    Continuous Blood Gluc Sensor (FREESTYLE FELY 14 DAY SENSOR) MISC Every 2 weeks 2 each 06    Continuous Blood Gluc  (FREESTYLE FELY 14 DAY READER) CATHLEEN As directed 2 each 3    FreeStyle Lancets MISC 1 each by Does not apply route daily 100 each 3    Insulin Pen Needle ([employer] PEN NEEDLES 31G) 31G X 8 MM MISC 1 each by Does not apply route 4 times daily 300 each 3    oxyCODONE-acetaminophen (PERCOCET) 5-325 MG per tablet Take 1 tablet by mouth every 4 hours as needed for Pain.       Insulin Pen Needle (NOVOFINE) 32G X 6 MM MISC qid 300 each 3    ammonium lactate (LAC-HYDRIN) 12 % lotion       atorvastatin (LIPITOR) 80 MG tablet take 1 tablet by mouth at bedtime      diclofenac sodium (VOLTAREN) 1 % GEL apply 4 grams to affected area three times a day AS NEEDED FOR PAIN      doxepin (SINEQUAN) 25 MG capsule 75 mg nightly       ondansetron (ZOFRAN-ODT) 4 MG disintegrating tablet Take 1 tablet by mouth 3 times daily as needed for Nausea or Vomiting 21 tablet 0    ranolazine (RANEXA) 1000 MG extended release tablet Take 1 tablet by mouth 2 times daily 60 tablet 3    gabapentin (NEURONTIN) 600 MG tablet Take 600 mg by mouth 2 times daily.  RESTASIS 0.05 % ophthalmic emulsion       neomycin-polymyxin-dexameth (MAXITROL) 3.5-27587-0.1 ophthalmic suspension instill 1 drop into both eyes four times a day      ARTIFICIAL TEARS 1.4 % ophthalmic solution       docusate sodium (COLACE, DULCOLAX) 100 MG CAPS Take 100 mg by mouth 2 times daily (Patient taking differently: Take 200 mg by mouth daily At bedtime.) 60 capsule 1    sertraline (ZOLOFT) 100 MG tablet Take 200 mg by mouth daily       furosemide (LASIX) 40 MG tablet Take 1 tablet by mouth daily 60 tablet 3    ticagrelor (BRILINTA) 90 MG TABS tablet Take 90 mg by mouth 2 times daily      CPAP Machine MISC by Does not apply route New CPAP with 10 cm 1 each 0    aspirin 81 MG EC tablet Take 1 tablet by mouth daily 30 tablet 3    OXYGEN 2 lit O2 with sleep , please give O2 concentrator 1 Units 0    Emollient (EUCERIN INTENSIVE REPAIR HAND) 2.5-10 % CREA APPLY TO FEET AT BEDTIME; PLACE SOCKS OVER FEET AFTER APPLICATION IF NEEDED FOR DRY CRACKING FEET      hydrOXYzine (VISTARIL) 25 MG capsule Take 50 mg by mouth 3 times daily as needed       Nutritional Supplements (GLUCERNA SHAKE) LIQD take as directed three times a day      isosorbide dinitrate (ISORDIL) 20 MG tablet Take 1 tablet by mouth 3 times daily 90 tablet 3    nitroGLYCERIN (NITROSTAT) 0.4 MG SL tablet up to max of 3 total doses.  If no relief after 1 dose, call 911. 25 tablet 3    hydrALAZINE (APRESOLINE) 50 MG tablet Take 1 tablet by mouth every 8 hours 90 tablet 3    metoprolol succinate (TOPROL XL) 50 MG extended release tablet Take 1 tablet by mouth 2 times daily 30 tablet 3    haloperidol (HALDOL) 5 MG tablet Take 5 mg by mouth daily      folic acid (FOLVITE) 1 MG tablet Take 1 mg by mouth daily      Iron Polysacch [id number] (NIFEREX-150 FORTE PO) Take 1 tablet by mouth daily       Cyanocobalamin (VITAMIN B-12) 1000 MCG extended release tablet Take 1,000 mcg by mouth daily      meclizine (ANTIVERT) 25 MG tablet Take 25 mg by mouth 2 times daily        No current facility-administered medications for this visit. Results for orders placed during the hospital encounter of 11/13/21    XR CHEST (2 VW)    Narrative  XR CHEST (2 VW): 11/16/2021 8:38 AM    CLINICAL HISTORY:  chf sob . COMPARISON: None available. TECHNIQUE: A portable upright AP radiograph of the chest was obtained. FINDINGS:  Status post median sternotomy. The cardiac silhouette is at the upper limits of normal which may be secondary to cardiomegaly versus pericardial effusion. The aorta is ectatic which most commonly correlates with chronic hypertension. There are diffuse increased interstitial  pulmonary markings throughout both lungs which may be secondary to pulmonary edema. Impression  The findings most likely reflect chronic congestive heart failure, CHF. Efe Martinez Results for orders placed during the hospital encounter of 10/28/21    XR CHEST (2 VW)    Narrative  EXAMINATION: XR CHEST (2 VW)    CLINICAL HISTORY: RIGHT LOWER LOBE PNEUMONIA    COMPARISONS: AUGUST 21, 2021    FINDINGS: Median sternotomy. Cardiopericardial silhouette normal. Aorta calcified. Pulmonary vasculature normal. Ill-defined areas increase opacity posterior right lung base. Left lung clear. Impression  RIGHT LOWER LUNG ATELECTASIS/PNEUMONIA. Results for orders placed during the hospital encounter of 06/28/20    XR CHEST STANDARD (2 VW)    Narrative  XR CHEST (2 VW) : 6/29/2020    CLINICAL HISTORY:  SOB .    COMPARISON: 6/28/2020. TECHNIQUE: Upright AP and lateral radiographs of the chest were obtained. FINDINGS:    A shallow inspiration is present without significant infiltrate identified.     The heart remains mildly enlarged with postoperative changes from previous CABG. There is no significant pleural effusion, vascular congestion, pneumothorax, or displaced fractures identified. Impression  NO EVIDENCE OF ACTIVE CARDIOPULMONARY DISEASE.  ]  Results for orders placed during the hospital encounter of 11/13/21    XR CHEST PORTABLE    Narrative  Exam: XR CHEST PORTABLE    History: Shortness of breath    Technique: AP portable view of the chest obtained. Comparison: Chest x-rays October 28, 2021    Findings:    Suboptimal inspiration. Evidence of prior thoracic surgery. Atherosclerotic calcification of the thoracic aorta. The cardiomediastinal silhouette is within normal limits. No pneumothorax, pleural effusion, or consolidation. Bibasilar atelectasis and/or  scarring. No acute osseous abnormality. Impression  No radiographic evidence of acute intrathoracic process. Results for orders placed during the hospital encounter of 08/21/21    XR CHEST PORTABLE    Narrative  EXAMINATION: CHEST PORTABLE VIEW    CLINICAL HISTORY: Midsternal chest pain    COMPARISONS: April 6, 2021 0143 hours    FINDINGS:    Single  views of the chest is submitted. There are multiple median sternotomy wires. The cardiac silhouette is enlarged  Pulmonary vascular unremarkable. Right sided trachea. Atelectasis, patchy infiltrate right lower lobe. No Pneumothoraces. Impression  ATELECTASIS, PATCHY INFILTRATE RIGHT LOWER LOBE. Results for orders placed during the hospital encounter of 04/06/21    XR CHEST PORTABLE    Narrative  EXAMINATION: XR CHEST PORTABLE    CLINICAL HISTORY: CHEST    COMPARISONS: SEPTEMBER 29, 2020    FINDINGS: Median sternotomy. Cardiopericardial silhouette enlarged and unchanged. Left cardiac margin has downward outward prominence, unchanged. Lungs clear.     Impression  STABLE CARDIOMEGALY WITH RADIOGRAPHIC SIGNS OF LEFT VENTRICULAR ENLARGEMENT, UNCHANGED.  ]  Results for orders placed during the hospital encounter of 09/27/19    XR CHEST (SINGLE VIEW FRONTAL)    Narrative  EXAMINATION: XR CHEST (SINGLE VIEW FRONTAL)    CLINICAL HISTORY: Z91.89 At risk for ineffective breathing ICD10    COMPARISONS: None available. FINDINGS: Single AP portable view the chest obtained on September 27, 2019 at 1715 hours. There is mild cardiomegaly without vascular congestion pulmonary edema or pleural effusion. The mediastinum is not widened or shifted. The calcified aorta is not  dilated. Sternotomy sutures are present. The chest wall is unremarkable. CONCLUSION: NO ACUTE PROCESS      Assessment/Plan:     1. Mild intermittent asthma without complication  Pt on 3L O2 at home at baseline. C/o shortness of breath  with exertion. No  Wheezing. C/o Cough mostly dry. - albuterol sulfate HFA (VENTOLIN HFA) 108 (90 Base) MCG/ACT inhaler; Inhale 2 puffs into the lungs as needed for Wheezing Every 4-6 hours PRN  Dispense: 1 each; Refill: 3  - albuterol (PROVENTIL) (2.5 MG/3ML) 0.083% nebulizer solution; Take 3 mLs by nebulization every 6 hours as needed for Wheezing  Dispense: 120 each; Refill: 3  - montelukast (SINGULAIR) 10 MG tablet; Take 1 tablet by mouth nightly  Dispense: 30 tablet; Refill: 3    2. JESICA (obstructive sleep apnea)  She is using CPAP with  10  centimeters of H2O with heated humidity and 3 lit O2 . She is using CPAP for about  3-4  hours but not every nightShe is using CPAP with   Full face Mask. She said  sleep is restful with the CPAP use. She is not compliant with CPAP therapy. No snoring with CPAP use. I reviewed compliance report with patient regarding CPAP therapy. She is using  CPAP for 20 days out of 30 days. Average usage of days used is 3  hours and 50 min , average AHI 13 with CPAP use. 3. Hypoxia  She is on 3 L O2 via nasal cannula. 4. Obesity (BMI 30-39. 9)  She is advised try to lose weight. obesity related risk explained to the patient ,  Current weight:  226 lb (102.5 kg) Lbs.  BMI: Body mass index is 38.79 kg/m². Suggested weight control approaches, including dietary changes , exercise, behavioral modification. 5. Congestive heart failure, unspecified HF chronicity, unspecified heart failure type [facility])  She has a chronic leg edema and following with cardiology for CHF      Return in about 4 months (around 8/11/2022) for asthma, hypoxia on O2, shortness of breath, vincent.       Chris Norman MD

## 2022-04-20 NOTE — ED PROVIDER NOTES
[facility] ED  eMERGENCY dEPARTMENT eNCOUnter      Pt Name: Disha Hankins  MRN: [id number]  [place] 1955  Date of evaluation: 4/20/2022  Provider: Cecy Mcbride PA-C    CHIEF COMPLAINT       Chief Complaint   Patient presents with    Abdominal Pain         HISTORY OF PRESENT ILLNESS   (Location/Symptom, Timing/Onset,Context/Setting, Quality, Duration, Modifying Factors, Severity)  Note limiting factors. Disha Hankins is a 61 y.o. female who presents to the emergency department with complaint of midepigastric abdominal pain with radiation up into her chest which she describes as a burning type sensation, she states is been ongoing for last 2 days, there is nausea, vomiting, no fevers, no diarrhea, she is states cough, and shortness of breath, she states this is chronically present for her no different than normal.  She normally wears 3 L nasal cannula oxygen, but on arrival to the ED she is not wearing her oxygen. She states she forgot it because she was having so much pain. Saturations are 95% on patient's arrival, and she appears in no acute distress. Patient states she has had similar episodes of abdominal pain in the past which were diagnosed as gastritis, she states she has never followed up with GI specialist.  Past medical history significant for diabetes, hyperlipidemia, asthma, peripheral vascular disease, hypertension, thyroid disease, coronary disease, chronic kidney disease. HPI    NursingNotes were reviewed. REVIEW OF SYSTEMS    (2-9 systems for level 4, 10 or more for level 5)     Review of Systems   Constitutional: Negative for activity change and appetite change. HENT: Negative for congestion, ear discharge, ear pain, nosebleeds, rhinorrhea and sore throat. Eyes: Negative for discharge. Respiratory: Negative for shortness of breath. Cardiovascular: Positive for chest pain. Negative for palpitations and leg swelling.    Gastrointestinal: Positive for abdominal pain, nausea and vomiting. Negative for abdominal distention and constipation. Genitourinary: Negative for difficulty urinating and dysuria. Musculoskeletal: Negative for arthralgias. Skin: Negative for color change, pallor, rash and wound. Neurological: Negative for dizziness, tremors, syncope, weakness, numbness and headaches. Psychiatric/Behavioral: Negative for agitation and confusion. Except as noted above the remainder of the review of systems was reviewed and negative.        PAST MEDICAL HISTORY     Past Medical History:   Diagnosis Date    Asthma     CAD (coronary artery disease)     CKD (chronic kidney disease) stage 3, GFR 30-59 ml/min ([facility]     Colitis     [facility] [address]     Hyperlipidemia     Hypertension     PAD (peripheral artery disease) ([facility])     Prolonged emergence from general anesthesia     PVD (peripheral vascular disease) ([facility] [address] [facility]     Thyroid disease          SURGICALHISTORY       Past Surgical History:   Procedure Laterality Date    CARDIAC SURGERY      CATARACT REMOVAL WITH IMPLANT Bilateral 11- 11-     SECTION      COLONOSCOPY N/A 2019    COLONOSCOPY DIAGNOSTIC performed by Foster Barbosa MD at [address] [place] [address] GRAFT  2019    unknown vessels    HYSTERECTOMY      TOE AMPUTATION Right     3rd toe         CURRENT MEDICATIONS       Discharge Medication List as of 2022 10:00 AM      CONTINUE these medications which have NOT CHANGED    Details   albuterol sulfate HFA (VENTOLIN HFA) 108 (90 Base) MCG/ACT inhaler Inhale 2 puffs into the lungs as needed for Wheezing Every 4-6 hours PRN, Disp-1 each, R-3Normal      albuterol (PROVENTIL) (2.5 MG/3ML) 0.083% nebulizer solution Take 3 mLs by nebulization every 6 hours as needed for Wheezing, Disp-120 each, R-3Normal      montelukast (SINGULAIR) 10 MG tablet Take 1 tablet by mouth nightly, Disp-30 tablet, R-3Normal      !! Continuous Blood Gluc Sensor (FREESTYLE FELY 14 DAY SENSOR) MISC Every 2 weeks, Disp-2 each, R-06Normal      insulin glargine (LANTUS SOLOSTAR) 100 UNIT/ML injection pen 60 units at bedtime, Disp-15 pen, R-3Normal      insulin lispro, 1 Unit Dial, (HUMALOG KWIKPEN) 100 UNIT/ML SOPN 15  units at each meals, Disp-10 pen, R-03Normal      Dulaglutide (TRULICITY) 4.41 YX/9.5HQ SOPN Inject 0.75 mg into the skin once a week, Disp-4 pen, R-3Normal      levothyroxine (SYNTHROID) 50 MCG tablet take 1 tablet by mouth once daily, Disp-30 tablet, R-5Normal      oxybutynin (DITROPAN-XL) 5 MG extended release tablet take 1 tablet by mouth once daily, Disp-90 tablet, R-3Normal      !! atorvastatin (LIPITOR) 40 MG tablet take 2 tablets by mouth dailyHistorical Med      busPIRone (BUSPAR) 10 MG tablet Historical Med      !! doxepin (SINEQUAN) 100 MG capsule Historical Med      prazosin (MINIPRESS) 2 MG capsule Historical Med      Alcohol Swabs (ALCOHOL PREP) 70 % PADS Disp-300 each, R-06, Normalqid      blood glucose test strips (FREESTYLE LITE) strip 1 each by In Vitro route daily As needed. , Disp-100 each, R-3Normal      !! Continuous Blood Gluc Sensor (FREESTYLE FELY 14 DAY SENSOR) MISC Every 2 weeks, Disp-2 each, R-06Normal      Continuous Blood Gluc  (FREESTYLE FELY 14 DAY READER) CATHLEEN As directed, Disp-2 each, R-3Normal      FreeStyle Lancets MISC DAILY Starting Thu 12/30/2021, Disp-100 each, R-3, Normal      !! Insulin Pen Needle ([employer] PEN NEEDLES 31G) 31G X 8 MM MISC 4 TIMES DAILY Starting Thu 12/30/2021, Disp-300 each, R-3, Normal      oxyCODONE-acetaminophen (PERCOCET) 5-325 MG per tablet Take 1 tablet by mouth every 4 hours as needed for Pain. Historical Med      !!  Insulin Pen Needle (NOVOFINE) 32G X 6 MM MISC Disp-300 each, R-3, Normalqid      ammonium lactate (LAC-HYDRIN) 12 % lotion Historical Med      !! atorvastatin (LIPITOR) 80 MG tablet take 1 tablet by mouth at bedtimeHistorical Med      diclofenac sodium (VOLTAREN) 1 % GEL apply 4 grams to affected area three times a day AS NEEDED FOR PAIN, Historical Med      !! doxepin (SINEQUAN) 25 MG capsule 75 mg nightly Historical Med      ranolazine (RANEXA) 1000 MG extended release tablet Take 1 tablet by mouth 2 times daily, Disp-60 tablet, R-3Normal      gabapentin (NEURONTIN) 600 MG tablet Take 600 mg by mouth 2 times daily. Historical Med      RESTASIS 0.05 % ophthalmic emulsion DAWHistorical Med      neomycin-polymyxin-dexameth (MAXITROL) 3.5-34629-7.1 ophthalmic suspension instill 1 drop into both eyes four times a dayHistorical Med      ARTIFICIAL TEARS 1.4 % ophthalmic solution DAWHistorical Med      docusate sodium (COLACE, DULCOLAX) 100 MG CAPS Take 100 mg by mouth 2 times daily, Disp-60 capsule,R-1Normal      sertraline (ZOLOFT) 100 MG tablet Take 200 mg by mouth daily Historical Med      furosemide (LASIX) 40 MG tablet Take 1 tablet by mouth daily, Disp-60 tablet,R-3Normal      ticagrelor (BRILINTA) 90 MG TABS tablet Take 90 mg by mouth 2 times dailyHistorical Med      CPAP Machine MISC Starting Thu 8/20/2020, Disp-1 each,R-0, PrintNew CPAP with 10 cm      aspirin 81 MG EC tablet Take 1 tablet by mouth daily, Disp-30 tablet, R-3Normal      OXYGEN 2 lit O2 with sleep , please give O2 concentrator, Disp-1 Units, R-0Print      Emollient (EUCERIN INTENSIVE REPAIR HAND) 2.5-10 % CREA APPLY TO FEET AT BEDTIME; PLACE SOCKS OVER FEET AFTER APPLICATION IF NEEDED FOR DRY CRACKING FEETHistorical Med      hydrOXYzine (VISTARIL) 25 MG capsule Take 50 mg by mouth 3 times daily as needed Historical Med      Nutritional Supplements (GLUCERNA SHAKE) LIQD take as directed three times a dayHistorical Med      isosorbide dinitrate (ISORDIL) 20 MG tablet Take 1 tablet by mouth 3 times daily, Disp-90 tablet, R-3Normal      nitroGLYCERIN (NITROSTAT) 0.4 MG SL tablet up to max of 3 total doses.  If no relief after 1 dose, call 911., Disp-25 tablet, R-3Normal      hydrALAZINE (APRESOLINE) 50 MG tablet Take 1 tablet by mouth every 8 hours, Disp-90 tablet, R-3Normal      metoprolol succinate (TOPROL XL) 50 MG extended release tablet Take 1 tablet by mouth 2 times daily, Disp-30 tablet, R-3Normal      haloperidol (HALDOL) 5 MG tablet Take 5 mg by mouth dailyHistorical Med      folic acid (FOLVITE) 1 MG tablet Take 1 mg by mouth dailyHistorical Med      Iron Polysacch Rokjg-W79-AM (NIFEREX-150 FORTE PO) Take 1 tablet by mouth daily Historical Med      Cyanocobalamin (VITAMIN B-12) 1000 MCG extended release tablet Take 1,000 mcg by mouth dailyHistorical Med      meclizine (ANTIVERT) 25 MG tablet Take 25 mg by mouth 2 times daily Historical Med       !! - Potential duplicate medications found. Please discuss with provider. ALLERGIES     Ambien [zolpidem tartrate], Capoten [captopril], Clioquinol, Cogentin [benztropine], Depakote [divalproex sodium], Effexor xr [venlafaxine hcl er], Geodon [ziprasidone hcl], Lisinopril, Lyrica [pregabalin], Navane [thiothixene], Pamelor [nortriptyline hcl], Remeron [mirtazapine], Risperdal [risperidone], Trazodone and nefazodone, and Wellbutrin [bupropion]    FAMILY HISTORY     No family history on file.        SOCIAL HISTORY       Social History     Socioeconomic History    Marital status:      Spouse name: Not on file    Number of children: Not on file    Years of education: Not on file    Highest education level: Not on file   Occupational History    Not on file   Tobacco Use    Smoking status: Never Smoker    Smokeless tobacco: Never Used   Vaping Use    Vaping Use: Never used   Substance and Sexual Activity    Alcohol use: Never    Drug use: Never    Sexual activity: Not on file   Other Topics Concern    Not on file   Social History Narrative         Lives With: Spouse    Type of Home: [address] [place] [address] [place] [address]: One level    Home Access: Elevator    Bathroom Shower/Tub: Tub/Shower unit(Simultaneous filing. User may not have seen previous data.)    Bathroom Equipment: Grab bars in shower, Shower chair    Home Equipment: Rolling walker, [address] 450 bed    ADL Assistance: Needs assistance    Homemaking Assistance: Needs assistance    Homemaking Responsibilities: No    Ambulation Assistance: Independent    Transfer Assistance: Independent    Active : No    Additional Comments: Pt wears orthopedic shoes at baseline    The patient states she is current with [facility](RN per the ). The patient had a walker, but obtained a hospital bed, wheel chair, BSC and O2 last admission (from [address]). pharmacy is [address] [place] [address],[address] [address] [facility]. Transportation is provided by [facility]	Moe Wise () per the patient     Social Determinants of Health     Financial Resource Strain:     Difficulty of Paying Living Expenses: Not on file   Food Insecurity:     Worried About [address] in the Last Year: Not on file    Shyay of Food in the Last Year: Not on file   Transportation Needs:     Lack of Transportation (Medical): Not on file    Lack of Transportation (Non-Medical):  Not on file   Physical Activity:     Days of Exercise per Week: Not on file    Minutes of Exercise per Session: Not on file   Stress:     Feeling of Stress : Not on file   Social Connections:     Frequency of Communication with Friends and Family: Not on file    Frequency of Social Gatherings with Friends and Family: Not on file    Attends Nondenominational Services: Not on file    Active Member of Clubs or Organizations: Not on file    Attends Club or Organization Meetings: Not on file    Marital Status: Not on file   Intimate Partner Violence:     Fear of Current or Ex-Partner: Not on file    Emotionally Abused: Not on file    Physically Abused: Not on file    Sexually Abused: Not on file   Housing Stability:     Unable to Pay for Housing in the Last Year: Not on file    Number of Places Lived in the Last Year: Not on file    Unstable Housing in the Last Year: Not on file       SCREENINGS    Antonio Coma Scale  Eye Opening: Spontaneous  Best Verbal Response: Oriented  Best Motor Response: Obeys commands  Antonio Coma Scale Score: 15 @FLOW([id number])@      PHYSICAL EXAM    (up to 7 for level 4, 8 or more for level 5)     ED Triage Vitals [04/20/22 0619]   BP Temp Temp src Pulse Resp SpO2 Height Weight   123/70 98 °F (36.7 °C) -- 76 22 95 % 5' 4\" (1.626 m) 225 lb (102.1 kg)       Physical Exam  Vitals and nursing note reviewed. Constitutional:       General: She is not in acute distress. Appearance: She is well-developed. She is not ill-appearing, toxic-appearing or diaphoretic. HENT:      Head: Normocephalic. Nose: No congestion. Mouth/Throat:      Mouth: Mucous membranes are moist.      Pharynx: No oropharyngeal exudate or posterior oropharyngeal erythema. Eyes:      Extraocular Movements: Extraocular movements intact. Conjunctiva/sclera: Conjunctivae normal.      Pupils: Pupils are equal, round, and reactive to light. Neck:      Vascular: No JVD. Trachea: No tracheal deviation. Cardiovascular:      Rate and Rhythm: Normal rate. Pulses: Normal pulses. Heart sounds: Normal heart sounds. No murmur heard. No friction rub. No gallop. Pulmonary:      Effort: Pulmonary effort is normal. No tachypnea, accessory muscle usage, respiratory distress or retractions. Breath sounds: Normal breath sounds. No stridor. No wheezing, rhonchi or rales. Comments: Lung sounds are clear in all fields, no wheezes rales or rhonchi, no excess muscle use, retractions, room air saturations are 95%  Chest:      Chest wall: No tenderness. Abdominal:      General: Abdomen is flat. Bowel sounds are normal. There is no distension or abdominal bruit. Palpations:  There is no shifting dullness, fluid wave, hepatomegaly, splenomegaly, mass or pulsatile mass. Tenderness: There is no abdominal tenderness. There is no right CVA tenderness, left CVA tenderness, guarding or rebound. Negative signs include Velez's sign, Rovsing's sign and McBurney's sign. Comments: Abdomen soft nondistended nontender no guarding mass rebound no CVA tenderness. No facial grimace on examination, negative Velez's or McBurney's. Musculoskeletal:         General: No deformity. Cervical back: Normal range of motion and neck supple. No rigidity. Skin:     General: Skin is warm and dry. Capillary Refill: Capillary refill takes less than 2 seconds. Coloration: Skin is not jaundiced. Neurological:      General: No focal deficit present. Mental Status: She is alert and oriented to person, place, and time. Mental status is at baseline. Cranial Nerves: No cranial nerve deficit. Sensory: No sensory deficit. Motor: No weakness. Coordination: Coordination normal.   Psychiatric:         Mood and Affect: Mood normal.         DIAGNOSTIC RESULTS     EKG: All EKG's are interpreted by the Emergency Department Physician who either signs or Co-signsthis chart in the absence of a cardiologist.    EKG shows sinus rhythm with a first-degree AV block at 67 bpm there is no acute ST segment abnormality no ventricular ectopy  ms. RADIOLOGY:   Non-plain filmimages such as CT, Ultrasound and MRI are read by the radiologist. Plain radiographic images are visualized and preliminarily interpreted by the emergency physician with the below findings:        Interpretation per the Radiologist below, if available at the time ofthis note:    CT ABDOMEN PELVIS W IV CONTRAST Additional Contrast? None   Final Result     IMPRESSION: NO ACUTE PATHOLOGY.          XR CHEST PORTABLE   Final Result   BILATERAL LOWER LUNG ATELECTASIS/PNEUMONIA            ED BEDSIDE ULTRASOUND:   Performed by ED Physician - none    LABS:  Labs Reviewed COMPREHENSIVE METABOLIC PANEL - Abnormal; Notable for the following components:       Result Value    Sodium 130 (*)     Chloride 94 (*)     Glucose 174 (*)     BUN 27 (*)     CREATININE 1.33 (*)     GFR Non- 40.1 (*)     GFR  48.5 (*)     Total Protein 8.5 (*)     Globulin 4.4 (*)     All other components within normal limits   CBC WITH AUTO DIFFERENTIAL - Abnormal; Notable for the following components:    Hematocrit 36.3 (*)     MCV 74.9 (*)     MCH 24.8 (*)     RDW 19.3 (*)     Neutrophils Absolute 7.5 (*)     All other components within normal limits   POCT VENOUS - Abnormal; Notable for the following components:    POC Creatinine 1.3 (*)     GFR Non- 41 (*)     GFR  50 (*)     All other components within normal limits   LACTIC ACID   LIPASE   URINALYSIS WITH REFLEX TO CULTURE   TROPONIN   CK   BRAIN NATRIURETIC PEPTIDE   POCT VENOUS       All other labs were within normal range or not returned as of this dictation. EMERGENCY DEPARTMENT COURSE and DIFFERENTIAL DIAGNOSIS/MDM:   Vitals:    Vitals:    04/20/22 0800 04/20/22 0830 04/20/22 0930 04/20/22 0932   BP: 133/64 131/65 135/69    Pulse:  65 66    Resp:  20 18    Temp:       SpO2: 95% 97% 100%    Weight:    225 lb (102.1 kg)   Height:    5' 4\" (1.626 m)          MDM  Number of Diagnoses or Management Options  Calculus of gallbladder without cholecystitis without obstruction  Right upper quadrant abdominal pain  Diagnosis management comments: Patient presents emergency department complaint of midepigastric abdominal pain with radiation to her chest which she describes as burning sensation which she said ongoing for the last 2 days, she said similar episodes before in the past, but evaluation by ED, and primary provider without acute findings. She has some mild nausea on arrival, she has some mild tenderness to the midepigastric region.   She was medicated for pain, nausea while in the ED, is very comfortable during her visit. CT scan of the abdomen pelvis shows notes specific acute pathology, there is incidental gallstones noted without secondary signs of acute gallbladder disease. Labs are fairly unimpressive. Discussed the findings of gallstones with the patient, and need for follow-up with general surgery if condition continues. She was discharged home with a prescription for Bentyl, and Zofran, given referral to Dr. Climmie Kehr of general surgery. Should she have any worsening or change symptoms, she was advised to return to the ED. CRITICAL CARE TIME   Total Critical Care time was 0 minutes, excluding separately reportableprocedures. There was a high probability of clinicallysignificant/life threatening deterioration in the patient's condition which required my urgent intervention. CONSULTS:  None    PROCEDURES:  Unless otherwise noted below, none     Procedures    FINAL IMPRESSION      1. Right upper quadrant abdominal pain    2.  Calculus of gallbladder without cholecystitis without obstruction          DISPOSITION/PLAN   DISPOSITION Decision To Discharge 04/20/2022 09:55:44 AM      PATIENT REFERRED TO:  Vani Brito  [facility] [address] [facility] [place] [address]  [phone]    In 3 days      Gio Matos MD  [facility] [address] [facility] [id number] [phone]    In 1 week        DISCHARGE MEDICATIONS:  Discharge Medication List as of 4/20/2022 10:00 AM      START taking these medications    Details   dicyclomine (BENTYL) 10 MG capsule Take 1 capsule by mouth every 6 hours as needed (cramps), Disp-20 capsule, R-0Print                (Please note that portions of this note were completed with a voice recognition program.  Efforts were made to edit the dictations but occasionally words are mis-transcribed.)    Mitesh Kessler PA-C (electronically signed)  Attending Emergency Physician         Mitesh Kessler PA-C  04/21/22 4925

## 2022-04-20 NOTE — ED TRIAGE NOTES
Patient presents to the Er with complaints of epigastric pain, chest pain, and shortness of breath. Patient is speaking with her eyes closed, tearful, complaining of pain.   Patient is supposed to be wearing 3L of oxygen at all times at home, states that she left it at home because she was in so much pain     Vital signs stable

## 2022-04-23 PROBLEM — R10.9 ABDOMINAL PAIN: Status: ACTIVE | Noted: 2022-01-01

## 2022-04-23 NOTE — ED TRIAGE NOTES
PT ARRIVED TO ED VIA EMS WITH C/C ABDOMINAL PAIN, N/V. PT CRYING, INCONSOLABLE. PT CURRENTLY DRY HEAVING. WAS D/C FROM ED 2 DAYS AGO WITH DX OF COLITIS.

## 2022-04-23 NOTE — PROGRESS NOTES
Use a  to talk to the patient. Patient is guarding on the right upper quadrant, ultrasound is negative for acute Teri will get surgery evaluation. Nausea and vomiting better, pt understand the management through the .

## 2022-04-23 NOTE — ED PROVIDER NOTES
[address] [facility] ED  eMERGENCYdEPARTMENT eNCOUnter      Pt Name: Shan Wise  MRN: [id number]  [place] 1955  Date of evaluation: 4/22/2022  Provider:Stephen Manley PA-C    CHIEF COMPLAINT       Chief Complaint   Patient presents with    Abdominal Pain     n/v, Discharged from ER a couple days prior          HISTORY OF PRESENT ILLNESS  (Location/Symptom, Timing/Onset, Context/Setting, Quality, Duration, Modifying Factors, Severity.)   Shan Wise is a 61 y.o. female who presents to the emergency department with complaints of persistent right upper quadrant abdominal pain with associated nausea and vomiting. Patient states that she was seen in the emergency department 2 days ago with the same complaints and was discharged after normal work-up. Patient had unremarkable CT at that time. Patient states that she has had no improvement in symptoms. Patient denies any chest pain or shortness of breath but does state a mild associated headache. Patient is very anxious, tearful and crying    HPI    Nursing Notes were reviewed and I agree. REVIEW OF SYSTEMS    (2-9 systems for level 4, 10 or more for level 5)     Review of Systems   Constitutional: Negative for diaphoresis and fever. HENT: Negative for hearing loss and trouble swallowing. Eyes: Negative for pain. Respiratory: Negative for apnea and shortness of breath. Cardiovascular: Negative for chest pain. Gastrointestinal: Positive for abdominal pain, nausea and vomiting. Endocrine: Negative. Genitourinary: Negative for hematuria. Musculoskeletal: Negative for neck pain and neck stiffness. Skin: Negative for color change. Allergic/Immunologic: Negative. Neurological: Negative for dizziness and numbness. Hematological: Negative. Psychiatric/Behavioral: Negative. All other systems reviewed and are negative. Except as noted above the remainder of the review of systems was reviewed and negative. PAST MEDICAL HISTORY     Past Medical History:   Diagnosis Date    Asthma     CAD (coronary artery disease)     CKD (chronic kidney disease) stage 3, GFR 30-59 ml/min ([facility])     Colitis     Diabetes mellitus ([facility]     Hyperlipidemia     Hypertension     PAD (peripheral artery disease) ([facility])     Prolonged emergence from general anesthesia     PVD (peripheral vascular disease) ([facility]     Thyroid disease          SURGICAL HISTORY       Past Surgical History:   Procedure Laterality Date    CARDIAC SURGERY      CATARACT REMOVAL WITH IMPLANT Bilateral 11- 11-     SECTION      COLONOSCOPY N/A 2019    COLONOSCOPY DIAGNOSTIC performed by Makenzie Ellis MD at [address] [place] [address] GRAFT  2019    unknown vessels    HYSTERECTOMY      TOE AMPUTATION Right     3rd toe         CURRENT MEDICATIONS       Previous Medications    ALBUTEROL (PROVENTIL) (2.5 MG/3ML) 0.083% NEBULIZER SOLUTION    Take 3 mLs by nebulization every 6 hours as needed for Wheezing    ALBUTEROL SULFATE HFA (VENTOLIN HFA) 108 (90 BASE) MCG/ACT INHALER    Inhale 2 puffs into the lungs as needed for Wheezing Every 4-6 hours PRN    ALCOHOL SWABS (ALCOHOL PREP) 70 % PADS    qid    AMMONIUM LACTATE (LAC-HYDRIN) 12 % LOTION        ARTIFICIAL TEARS 1.4 % OPHTHALMIC SOLUTION        ASPIRIN 81 MG EC TABLET    Take 1 tablet by mouth daily    ATORVASTATIN (LIPITOR) 40 MG TABLET    take 2 tablets by mouth daily    ATORVASTATIN (LIPITOR) 80 MG TABLET    take 1 tablet by mouth at bedtime    BLOOD GLUCOSE TEST STRIPS (FREESTYLE LITE) STRIP    1 each by In Vitro route daily As needed.     BUSPIRONE (BUSPAR) 10 MG TABLET        CONTINUOUS BLOOD GLUC  (FREESTYLE FELY 14 DAY READER) CATHLEEN    As directed    CONTINUOUS BLOOD GLUC SENSOR (FREESTYLE FELY 14 DAY SENSOR) MISC    Every 2 weeks    CONTINUOUS BLOOD GLUC SENSOR (FREESTYLE FELY 14 DAY SENSOR) MISC    Every 2 weeks    CPAP MACHINE MISC by Does not apply route New CPAP with 10 cm    CYANOCOBALAMIN (VITAMIN B-12) 1000 MCG EXTENDED RELEASE TABLET    Take 1,000 mcg by mouth daily    DICLOFENAC SODIUM (VOLTAREN) 1 % GEL    apply 4 grams to affected area three times a day AS NEEDED FOR PAIN    DICYCLOMINE (BENTYL) 10 MG CAPSULE    Take 1 capsule by mouth every 6 hours as needed (cramps)    DOCUSATE SODIUM (COLACE, DULCOLAX) 100 MG CAPS    Take 100 mg by mouth 2 times daily    DOXEPIN (SINEQUAN) 100 MG CAPSULE        DOXEPIN (SINEQUAN) 25 MG CAPSULE    75 mg nightly     DULAGLUTIDE (TRULICITY) 9.23 TK/9.7FL SOPN    Inject 0.75 mg into the skin once a week    EMOLLIENT (EUCERIN INTENSIVE REPAIR HAND) 2.5-10 % CREA    APPLY TO FEET AT BEDTIME; PLACE SOCKS OVER FEET AFTER APPLICATION IF NEEDED FOR DRY CRACKING FEET    FOLIC ACID (FOLVITE) 1 MG TABLET    Take 1 mg by mouth daily    FREESTYLE LANCETS MISC    1 each by Does not apply route daily    FUROSEMIDE (LASIX) 40 MG TABLET    Take 1 tablet by mouth daily    GABAPENTIN (NEURONTIN) 600 MG TABLET    Take 600 mg by mouth 2 times daily.     HALOPERIDOL (HALDOL) 5 MG TABLET    Take 5 mg by mouth daily    HYDRALAZINE (APRESOLINE) 50 MG TABLET    Take 1 tablet by mouth every 8 hours    HYDROXYZINE (VISTARIL) 25 MG CAPSULE    Take 50 mg by mouth 3 times daily as needed     INSULIN GLARGINE (LANTUS SOLOSTAR) 100 UNIT/ML INJECTION PEN    60 units at bedtime    INSULIN LISPRO, 1 UNIT DIAL, (HUMALOG KWIKPEN) 100 UNIT/ML SOPN    15  units at each meals    INSULIN PEN NEEDLE ([employer] PEN NEEDLES 31G) 31G X 8 MM MISC    1 each by Does not apply route 4 times daily    INSULIN PEN NEEDLE (NOVOFINE) 32G X 6 MM MISC    qid    IRON POLYSACCH HNAVA-M00-KL (NIFEREX-150 FORTE PO)    Take 1 tablet by mouth daily     ISOSORBIDE DINITRATE (ISORDIL) 20 MG TABLET    Take 1 tablet by mouth 3 times daily    LEVOTHYROXINE (SYNTHROID) 50 MCG TABLET    take 1 tablet by mouth once daily    MECLIZINE (ANTIVERT) 25 MG TABLET    Take 25 mg by mouth 2 times daily     METOPROLOL SUCCINATE (TOPROL XL) 50 MG EXTENDED RELEASE TABLET    Take 1 tablet by mouth 2 times daily    MONTELUKAST (SINGULAIR) 10 MG TABLET    Take 1 tablet by mouth nightly    NEOMYCIN-POLYMYXIN-DEXAMETH (MAXITROL) 3.5-89992-3.1 OPHTHALMIC SUSPENSION    instill 1 drop into both eyes four times a day    NITROGLYCERIN (NITROSTAT) 0.4 MG SL TABLET    up to max of 3 total doses. If no relief after 1 dose, call 911. NUTRITIONAL SUPPLEMENTS (GLUCERNA SHAKE) LIQD    take as directed three times a day    ONDANSETRON (ZOFRAN ODT) 4 MG DISINTEGRATING TABLET    Take 1 tablet by mouth every 8 hours as needed for Nausea    OXYBUTYNIN (DITROPAN-XL) 5 MG EXTENDED RELEASE TABLET    take 1 tablet by mouth once daily    OXYCODONE-ACETAMINOPHEN (PERCOCET) 5-325 MG PER TABLET    Take 1 tablet by mouth every 4 hours as needed for Pain. OXYGEN    2 lit O2 with sleep , please give O2 concentrator    PRAZOSIN (MINIPRESS) 2 MG CAPSULE        RANOLAZINE (RANEXA) 1000 MG EXTENDED RELEASE TABLET    Take 1 tablet by mouth 2 times daily    RESTASIS 0.05 % OPHTHALMIC EMULSION        SERTRALINE (ZOLOFT) 100 MG TABLET    Take 200 mg by mouth daily     TICAGRELOR (BRILINTA) 90 MG TABS TABLET    Take 90 mg by mouth 2 times daily       ALLERGIES     Ambien [zolpidem tartrate], Capoten [captopril], Clioquinol, Cogentin [benztropine], Depakote [divalproex sodium], Effexor xr [venlafaxine hcl er], Geodon [ziprasidone hcl], Lisinopril, Lyrica [pregabalin], Navane [thiothixene], Pamelor [nortriptyline hcl], Remeron [mirtazapine], Risperdal [risperidone], Trazodone and nefazodone, and Wellbutrin [bupropion]    FAMILY HISTORY     History reviewed. No pertinent family history.        SOCIAL HISTORY       Social History     Socioeconomic History    Marital status:      Spouse name: None    Number of children: None    Years of education: None    Highest education level: None   Occupational History    None   Tobacco Use    Smoking status: Never Smoker    Smokeless tobacco: Never Used   Vaping Use    Vaping Use: Never used   Substance and Sexual Activity    Alcohol use: Never    Drug use: Never    Sexual activity: None   Other Topics Concern    None   Social History Narrative         Lives With: Spouse    Type of Home: [address] [place] [address] [address]: One level    Home Access: Elevator    Bathroom Shower/Tub: Tub/Shower unit(Simultaneous filing. User may not have seen previous data.)    Bathroom Equipment: Grab bars in shower, Shower chair    Home Equipment: Rolling walker, [address] 450 bed    ADL Assistance: Needs assistance    Homemaking Assistance: Needs assistance    Homemaking Responsibilities: No    Ambulation Assistance: Independent    Transfer Assistance: Independent    Active : No    Additional Comments: Pt wears orthopedic shoes at baseline    The patient states she is current with [facility](RN per the ). The patient had a walker, but obtained a hospital bed, wheel chair, BSC and O2 last admission (from [address] [place] [address] Road). pharmacy is [address] [place] [address],[address] [address]., Francia Cruz. Transportation is provided by [employer]	[place] () per the patient     Social Determinants of Health     Financial Resource Strain:     Difficulty of Paying Living Expenses: Not on file   Food Insecurity:     Worried About [facility] in the Last Year: Not on file    Shayy of Food in the Last Year: Not on file   Transportation Needs:     Lack of Transportation (Medical): Not on file    Lack of Transportation (Non-Medical):  Not on file   Physical Activity:     Days of Exercise per Week: Not on file    Minutes of Exercise per Session: Not on file   Stress:     Feeling of Stress : Not on file   Social Connections:     Frequency of Communication with Friends and Family: Not on file    Frequency of Social Gatherings with Friends and Family: Not on file    Attends Yarsani Services: Not on file    Active Member of Clubs or Organizations: Not on file    Attends Club or Organization Meetings: Not on file    Marital Status: Not on file   Intimate Partner Violence:     Fear of Current or Ex-Partner: Not on file    Emotionally Abused: Not on file    Physically Abused: Not on file    Sexually Abused: Not on file   Housing Stability:     Unable to Pay for Housing in the Last Year: Not on file    Number of [place] in the Last Year: Not on file    Unstable Housing in the Last Year: Not on file       SCREENINGS    [place] Coma Scale  Eye Opening: Spontaneous  Best Verbal Response: Oriented  Best Motor Response: Obeys commands  [place] Coma Scale Score: 15      PHYSICAL EXAM    (up to 7 forlevel 4, 8 or more for level 5)     ED Triage Vitals   BP Temp Temp Source Pulse Resp SpO2 Height Weight   04/22/22 2118 04/22/22 2117 04/22/22 2117 04/22/22 2118 04/22/22 2114 04/22/22 2118 04/22/22 2114 04/22/22 2114   (!) 134/114 97.6 °F (36.4 °C) Oral 67 26 98 % 5' 4\" (1.626 m) 225 lb (102.1 kg)       Physical Exam  Vitals and nursing note reviewed. Constitutional:       General: She is not in acute distress. Appearance: She is well-developed. She is not diaphoretic. HENT:      Head: Normocephalic and atraumatic. Mouth/Throat:      Pharynx: No oropharyngeal exudate. Eyes:      General: No scleral icterus. Conjunctiva/sclera: Conjunctivae normal.      Pupils: Pupils are equal, round, and reactive to light. Neck:      Trachea: No tracheal deviation. Cardiovascular:      Rate and Rhythm: Normal rate. Heart sounds: Normal heart sounds. Pulmonary:      Effort: Pulmonary effort is normal. No respiratory distress. Breath sounds: Normal breath sounds. Abdominal:      General: Bowel sounds are normal. There is no distension. Palpations: Abdomen is soft. Tenderness:  There is abdominal tenderness in the right upper quadrant and epigastric area. Musculoskeletal:         General: Normal range of motion. Cervical back: Normal range of motion and neck supple. Skin:     General: Skin is warm and dry. Findings: No erythema or rash. Neurological:      Mental Status: She is alert and oriented to person, place, and time. Cranial Nerves: No cranial nerve deficit. Motor: No abnormal muscle tone. Psychiatric:         Behavior: Behavior normal.         Thought Content:  Thought content normal.         Judgment: Judgment normal.           DIAGNOSTIC RESULTS     RADIOLOGY:   Non-plain film images such as CT, Ultrasound and MRI are read by the radiologist. Yuan Rana radiographic images are visualized and preliminarilyinterpreted by Jose Eduardo Raygoza PA-C with the below findings:      Interpretation per the Radiologist below, if available at the time of this note:    CT ABDOMEN PELVIS WO CONTRAST Additional Contrast? None    (Results Pending)       LABS:  Labs Reviewed   COMPREHENSIVE METABOLIC PANEL - Abnormal; Notable for the following components:       Result Value    Glucose 190 (*)     BUN 31 (*)     CREATININE 1.77 (*)     GFR Non- 28.8 (*)     GFR  34.9 (*)     Total Protein 8.4 (*)     Globulin 4.4 (*)     All other components within normal limits   CBC WITH AUTO DIFFERENTIAL - Abnormal; Notable for the following components:    Hemoglobin 11.6 (*)     Hematocrit 34.7 (*)     MCV 73.4 (*)     MCH 24.5 (*)     RDW 19.4 (*)     Monocytes Absolute 0.9 (*)     All other components within normal limits   URINALYSIS WITH REFLEX TO CULTURE - Abnormal; Notable for the following components:    Color, UA DARK YELLOW (*)     Ketones, Urine TRACE (*)     Protein, UA TRACE (*)     Leukocyte Esterase, Urine TRACE (*)     All other components within normal limits   POCT GLUCOSE - Normal   LIPASE   LACTIC ACID   MICROSCOPIC URINALYSIS       All other labs were within normal range or not returnedas of this dictation. EMERGENCYDEPARTMENT COURSE and DIFFERENTIAL DIAGNOSIS/MDM:   Vitals:    Vitals:    04/22/22 2200 04/22/22 2230 04/22/22 2300 04/23/22 0045   BP: (!) 144/80 129/64 138/75 (!) 163/125   Pulse: 64 62 65    Resp: 16 16 16    Temp:       TempSrc:       SpO2: 99% 96% 95% 98%   Weight:       Height:           REASSESSMENT        Patient presented the emergency department with persistent right upper quadrant abdominal pain with associated nausea and vomiting despite the use of oral meds that were prescribed on her visit 2 days ago for the same complaints. After multiple rounds of pain and nausea medicines the patient still has persistent severe pain and nausea. At this time I discussed admission with the hospitalist for pain control, further evaluation and care. MDM    PROCEDURES:    Procedures      FINAL IMPRESSION      1. Abdominal pain, right upper quadrant    2. Non-intractable vomiting with nausea, unspecified vomiting type          DISPOSITION/PLAN   DISPOSITION Admitted 04/23/2022 12:49:35 AM      PATIENT REFERRED TO:  No follow-up provider specified.     DISCHARGE MEDICATIONS:  New Prescriptions    No medications on file       (Please note that portions of this note were completed with a voice recognition program.  Efforts were made to edit the dictations but occasionally words are mis-transcribed.)    FERMIN Solares PA-C  04/23/22 4880 restarted  on metformin   - c/w HSS  A1c: 6.8 restarted  on metformin   - c/w HSS  A1c: 6.8 Appears patient may have ataxic gait with R sided drift. Unable to fully confirm with exam.  -F/up Neuro (Dr. Bello)  -f/u Head MRI   -c/w ASA and Statin

## 2022-04-23 NOTE — PROGRESS NOTES
Pt received to floor. Pt was accompanied by family member. Pt is Frisian speaking, dr. Kashif Jones was in to see pt.  used. Pt complains of pain 9/10, perfect served Dr. Faiza Schwartz. Pain managed per mar. Pt is A+OX4 and resting quietly in bed after receiving medication. Med reconciliation complete and bed is in lowest position, vital signs stable. Continue to monitor.

## 2022-04-23 NOTE — FLOWSHEET NOTE
Assessment complete. LS clear t/o. BS hypoactive x4 quads. Pt c/o pain 3/10 to RLQ radiating to lower back. Skin intact. +2 non-piting edema noted at BLE. PPP yet weak at +1 bilaterally. Third toe on R foot amputated. Appears healed. Pt denies nausea at this time. Pt states feels more comfortable lying on her L side wc is position at this time. Pt denies need for pain medication. No s/s of discomfort/distress at this time. Will continue to monitor.

## 2022-04-23 NOTE — H&P
Hospitalist Group   History and Physical      CHIEF COMPLAINT: Abdominal pain    History of Present Illness:  61 y.o. female with a history of diabetes, CAD, hypertension, hyperlipidemia, peripheral arterial disease, presents with right upper quadrant and epigastric abdominal pain. Pain started a week ago and has been continuous. It is associated with nausea and vomiting. Denied diarrhea. Patient said that she has been eating with no problem. Patient presented to the ER initially few days ago and CT scan showed gallstones without acute cholecystitis. General surgery consulted in the ER and they recommended outpatient follow-up. However, due to patient's worsening symptoms she decided to come back to the ED. REVIEW OF SYSTEMS:  no fevers, chills, cp, sob, has n/v, no ha, vision/hearing changes, wt changes, hot/cold flashes, other open skin lesions, diarrhea, constipation, dysuria/hematuria unless noted in HPI. Complete ROS performed with the patient and is otherwise negative. PMH:  Past Medical History:   Diagnosis Date    Asthma     CAD (coronary artery disease)     CKD (chronic kidney disease) stage 3, GFR 30-59 ml/min ([facility])     Colitis     Diabetes mellitus ([address]     Hyperlipidemia     Hypertension     PAD (peripheral artery disease) ([facility])     Prolonged emergence from general anesthesia     PVD (peripheral vascular disease) ([address]     Thyroid disease        Surgical History:  Past Surgical History:   Procedure Laterality Date    CARDIAC SURGERY      CATARACT REMOVAL WITH IMPLANT Bilateral 11- 11-     SECTION      COLONOSCOPY N/A 2019    COLONOSCOPY DIAGNOSTIC performed by Kateryna Houston MD at [address] [place] [address] GRAFT  2019    unknown vessels    HYSTERECTOMY      TOE AMPUTATION Right     3rd toe       Medications Prior to Admission:    Prior to Admission medications    Medication Sig Start Date End Date Taking? Authorizing Provider   dicyclomine (BENTYL) 10 MG capsule Take 1 capsule by mouth every 6 hours as needed (cramps) 4/20/22   Paras Adamson PA-C   ondansetron (ZOFRAN ODT) 4 MG disintegrating tablet Take 1 tablet by mouth every 8 hours as needed for Nausea 4/20/22   Shweta Self PA-C   albuterol sulfate HFA (VENTOLIN HFA) 108 (90 Base) MCG/ACT inhaler Inhale 2 puffs into the lungs as needed for Wheezing Every 4-6 hours PRN 4/11/22   Garcia Kumari MD   albuterol (PROVENTIL) (2.5 MG/3ML) 0.083% nebulizer solution Take 3 mLs by nebulization every 6 hours as needed for Wheezing 4/11/22 5/11/22  Garcia Kumari MD   montelukast (SINGULAIR) 10 MG tablet Take 1 tablet by mouth nightly 4/11/22   Garcia Kumari MD   Continuous Blood Gluc Sensor (FREESTYLE FELY 14 DAY SENSOR) MISC Every 2 weeks 3/30/22   Mariluz Moore MD   insulin glargine (LANTUS SOLOSTAR) 100 UNIT/ML injection pen 60 units at bedtime 3/30/22   Mariluz Moore MD   insulin lispro, 1 Unit Dial, (HUMALOG KWIKPEN) 100 UNIT/ML SOPN 15  units at each meals 3/30/22   Mariluz Moore MD   Dulaglutide (TRULICITY) 8.53 BS/0.5FG SOPN Inject 0.75 mg into the skin once a week 3/30/22   Mariluz Moore MD   levothyroxine (SYNTHROID) 50 MCG tablet take 1 tablet by mouth once daily 3/30/22   Mariluz Moore MD   oxybutynin (DITROPAN-XL) 5 MG extended release tablet take 1 tablet by mouth once daily 2/23/22   Francesco Knowles MD   atorvastatin (LIPITOR) 40 MG tablet take 2 tablets by mouth daily 12/9/21   Historical Provider, MD   busPIRone (BUSPAR) 10 MG tablet  12/29/21   Historical Provider, MD   doxepin (SINEQUAN) 100 MG capsule  12/29/21   Historical Provider, MD   prazosin (MINIPRESS) 2 MG capsule  12/21/21   Historical Provider, MD   Alcohol Swabs (ALCOHOL PREP) 70 % PADS qid 12/30/21   Mariluz Moore MD   blood glucose test strips (FREESTYLE LITE) strip 1 each by In Vitro route daily As needed.  12/30/21   Mariluz Moore MD   Continuous Blood Gluc Sensor (FREESTYLE FELY 14 DAY SENSOR) MISC Every 2 weeks 12/30/21   Kathlyn Riedel, MD   Continuous Blood Gluc  (FREESTYLE FELY 14 DAY READER) CATHLEEN As directed 12/30/21   Kathlyn Riedel, MD   FreeStyle Lancets MISC 1 each by Does not apply route daily 12/30/21   Kathlyn Riedel, MD   Insulin Pen Needle ([employer] PEN NEEDLES 31G) 31G X 8 MM MISC 1 each by Does not apply route 4 times daily 12/30/21   Kathlyn Riedel, MD   oxyCODONE-acetaminophen (PERCOCET) 5-325 MG per tablet Take 1 tablet by mouth every 4 hours as needed for Pain. Historical Provider, MD   Insulin Pen Needle (NOVOFINE) 32G X 6 MM MISC qid 9/22/21   Kathlyn Riedel, MD   ammonium lactate (LAC-HYDRIN) 12 % lotion  6/23/21   Historical Provider, MD   atorvastatin (LIPITOR) 80 MG tablet take 1 tablet by mouth at bedtime 8/6/21   Historical Provider, MD   diclofenac sodium (VOLTAREN) 1 % GEL apply 4 grams to affected area three times a day AS NEEDED FOR PAIN 8/22/21   Historical Provider, MD   doxepin (SINEQUAN) 25 MG capsule 75 mg nightly  8/6/21   Historical Provider, MD   ranolazine (RANEXA) 1000 MG extended release tablet Take 1 tablet by mouth 2 times daily 8/25/21   Levi Cardoza MD   gabapentin (NEURONTIN) 600 MG tablet Take 600 mg by mouth 2 times daily. Historical Provider, MD   RESTASIS 0.05 % ophthalmic emulsion  3/8/21   Historical Provider, MD   neomycin-polymyxin-dexameth (MAXITROL) 3.5-84831-5.1 ophthalmic suspension instill 1 drop into both eyes four times a day 1/5/21   Historical Provider, MD   ARTIFICIAL TEARS 1.4 % ophthalmic solution  3/8/21   Historical Provider, MD   docusate sodium (COLACE, DULCOLAX) 100 MG CAPS Take 100 mg by mouth 2 times daily  Patient taking differently: Take 200 mg by mouth daily At bedtime.  10/3/20   Jesse Waggoner MD   sertraline (ZOLOFT) 100 MG tablet Take 200 mg by mouth daily  9/14/20   Historical Provider, MD   furosemide (LASIX) 40 MG tablet Take 1 tablet by mouth daily 9/4/20   Antonia Ruiz MD   ticagrelor (BRILINTA) 90 MG TABS tablet Take 90 mg by mouth 2 times daily    Historical Provider, MD   CPAP Machine MISC by Does not apply route New CPAP with 10 cm 8/20/20   Phu Warren MD   aspirin 81 MG EC tablet Take 1 tablet by mouth daily 6/30/20   Emily Cole MD   OXYGEN 2 lit O2 with sleep , please give O2 concentrator 6/12/20   Phu Warren MD   Emollient (EUCERIN INTENSIVE REPAIR HAND) 2.5-10 % CREA APPLY TO FEET AT BEDTIME; PLACE SOCKS OVER FEET AFTER APPLICATION IF NEEDED FOR DRY CRACKING FEET 3/15/20   Historical Provider, MD   hydrOXYzine (VISTARIL) 25 MG capsule Take 50 mg by mouth 3 times daily as needed  3/4/20   Historical Provider, MD   Nutritional Supplements ([address]) LIQD take as directed three times a day 6/1/20   Historical Provider, MD   isosorbide dinitrate (ISORDIL) 20 MG tablet Take 1 tablet by mouth 3 times daily 4/28/20   Emily Cole MD   nitroGLYCERIN (NITROSTAT) 0.4 MG SL tablet up to max of 3 total doses.  If no relief after 1 dose, call 911. 4/28/20   Emily Cole MD   hydrALAZINE (APRESOLINE) 50 MG tablet Take 1 tablet by mouth every 8 hours 4/28/20   Emily Cole MD   metoprolol succinate (TOPROL XL) 50 MG extended release tablet Take 1 tablet by mouth 2 times daily 4/28/20   Emily Cole MD   haloperidol (HALDOL) 5 MG tablet Take 5 mg by mouth daily    Historical Provider, MD   folic acid (FOLVITE) 1 MG tablet Take 1 mg by mouth daily    Historical Provider, MD   Iron Polysacch Bkate-W14-TZ (NIFEREX-150 FORTE PO) Take 1 tablet by mouth daily     Historical Provider, MD   Cyanocobalamin (VITAMIN B-12) 1000 MCG extended release tablet Take 1,000 mcg by mouth daily    Historical Provider, MD   meclizine (ANTIVERT) 25 MG tablet Take 25 mg by mouth 2 times daily     Historical Provider, MD       Allergies:    Ambien [zolpidem tartrate], Capoten [captopril], Clioquinol, Cogentin [benztropine], Depakote [divalproex sodium], Effexor xr [venlafaxine hcl er], Geodon [ziprasidone hcl], Lisinopril, Lyrica [pregabalin], Navane [thiothixene], Pamelor [nortriptyline hcl], Remeron [mirtazapine], Risperdal [risperidone], Trazodone and nefazodone, and Wellbutrin [bupropion]    Social History:    reports that she has never smoked. She has never used smokeless tobacco. She reports that she does not drink alcohol and does not use drugs. Family History:   History reviewed. No pertinent family history.     PHYSICAL EXAM:  Vitals:  BP (!) 163/72   Pulse 62   Temp 97.6 °F (36.4 °C) (Oral)   Resp 16   Ht 5' 4\" (1.626 m)   Wt 225 lb (102.1 kg)   SpO2 98%   BMI 38.62 kg/m²   General Appearance: alert and oriented to person, place and time, well developed and well- nourished, in no acute distress  Skin: warm and dry, no rash or erythema  Head: normocephalic and atraumatic  Eyes: pupils equal, round, and reactive to light, extraocular eye movements intact, conjunctivae normal  ENT: tympanic membrane, external ear and ear canal normal bilaterally, nose without deformity, nasal mucosa and turbinates normal without polyps  Neck: supple and non-tender without mass, no thyromegaly or thyroid nodules, no cervical lymphadenopathy  Pulmonary/Chest: clear to auscultation bilaterally- no wheezes, rales   Cardiovascular: normal rate, regular rhythm, normal S1 and S2, no murmurs   Abdomen: soft, has right upper quadrant tenderness, non-distended, normal bowel sounds, no masses or organomegaly  Extremities: no cyanosis, clubbing or edema  Musculoskeletal: normal range of motion, no joint swelling, deformity or tenderness  Neurologic: reflexes normal and symmetric, no cranial nerve deficit     LABS:  Recent Labs     04/20/22  0645 04/20/22  0705 04/22/22 2122 04/22/22  2130   *  --   --  136   K 4.0  --   --  4.8   CL 94*  --   --  98   CO2 24  --   --  28   BUN 27*  --   --  31*   CREATININE 1.33* 1.3*  --  1.77*   GLUCOSE 174*  --  192 190*   CALCIUM 9.6  --   --  9.3       Recent Labs 04/20/22  0645 04/22/22 2130   WBC 9.8 8.9   RBC 4.85 4.73   HGB 12.0 11.6*   HCT 36.3* 34.7*   MCV 74.9* 73.4*   MCH 24.8* 24.5*   MCHC 33.1 33.4   RDW 19.3* 19.4*    237       No results for input(s): POCGLU in the last 72 hours. CBC with Differential:    Lab Results   Component Value Date    WBC 8.9 04/22/2022    RBC 4.73 04/22/2022    HGB 11.6 04/22/2022    HCT 34.7 04/22/2022     04/22/2022    MCV 73.4 04/22/2022    MCH 24.5 04/22/2022    MCHC 33.4 04/22/2022    RDW 19.4 04/22/2022    NRBC 0.2 05/22/2019    BANDSPCT 2 05/06/2020    LYMPHOPCT 17.8 04/22/2022    MONOPCT 10.0 04/22/2022    BASOPCT 0.3 04/22/2022    MONOSABS 0.9 04/22/2022    LYMPHSABS 1.6 04/22/2022    EOSABS 0.1 04/22/2022    BASOSABS 0.0 04/22/2022     CMP:    Lab Results   Component Value Date     04/22/2022    K 4.8 04/22/2022    K 4.2 11/17/2021    CL 98 04/22/2022    CO2 28 04/22/2022    BUN 31 04/22/2022    CREATININE 1.77 04/22/2022    GFRAA 34.9 04/22/2022    LABGLOM 28.8 04/22/2022    GLUCOSE 190 04/22/2022    GLUCOSE 279 01/06/2020    PROT 8.4 04/22/2022    LABALBU 4.0 04/22/2022    LABALBU <7.0 01/06/2020    CALCIUM 9.3 04/22/2022    BILITOT 0.4 04/22/2022    ALKPHOS 112 04/22/2022    AST 19 04/22/2022    ALT 13 04/22/2022       Radiology: No results found. ASSESSMENT/ PLAN[de-identified]      Principal Problem:    Abdominal pain  Resolved Problems:    * No resolved hospital problems. *      1. Cholelithiasis   Right upper quadrant tenderness for the past week    Associated with nausea and vomiting   No sign of acute cholecystitis on CT scan   Will check right upper quadrant ultrasound   Consult general surgery if needed   Pain control  2. RADHA   Creatinine 1.77 previous creatinine 1.33. Patient's creatinine ranges between 1.0-1.9   IV fluids and monitor renal function  3. Hypertension   Resume home medication  4.  Diabetes   Insulin sliding scale   Resume home insulin after verification    Code Status: Full  DVT prophylaxis: Lovenox         Electronically signed by Kori Reyna MD on 4/23/2022 at 2:23 AM      NOTE: This report was transcribed using voice recognition software. Every effort was made to ensure accuracy; however, inadvertent computerized transcription errors may be present.

## 2022-04-23 NOTE — CARE COORDINATION
[facility] Case Management Initial Discharge Assessment    Met with Patient to discuss discharge plan. PCP: Alexandria Mcmanus                                Date of Last Visit: DECEMBER    [facility] Patient: No        [facility] Notified: no    If no PCP, list provided? N/A    Discharge Planning    Living Arrangements: independently at home    Who do you live with? SPOUSE    Who helps you with your care:  self    If lives at home:     Do you have any barriers navigating in your home? no    Patient can perform ADL? Yes    [facility] (out[facility] and [facility] home) [facility]    Dialysis: No    Is transportation available to get to your appointments? Yes    DME Equipment:  yes - HOSPITAL BED SHOWER CHAIR, CPAP    Respiratory equipment: Continuous Oxygen  3 Liters , NEBULIZER    Respiratory provider:  yes - MEDICAL SERVICES     Pharmacy:  yes - [facility] [place] [id number] with Medication Assistance Program?  No      Patient agreeable to [facility]? Yes, [facility]    Patient agreeable to SNF/Rehab? Declined    Other discharge needs identified? PATIENT REQUESTING WALKER AND BEDSIDE COMMODE    Does Patient Have a High-Risk for Readmission Diagnosis (CHF, PN, MI, COPD)? No      Initial Discharge Plan? (Note: please see concurrent daily documentation for any updates after initial note).     [facility]    Readmission Risk              Risk of Unplanned Readmission:  39         Electronically signed by Odessa Hernández RN on 4/23/2022 at 3:19 PM

## 2022-04-23 NOTE — CONSULTS
Spiritual Care Services     Summary of Visit:  Pt very anxious today. Concern about not taking her medications, and about uncertainty regarding her plan of care. Offered much reassurance, and active listening. Pt's spouse also did not pack what she wanted, and she feels uncomfortable not having her things from home. Pt's vicky very important to her, she belongs to a [employer] that is important to her. She is choosing to wait to notify them of her hospitalization until she knows her future plan of care, especially if she is to have surgery or a procedure. Prayer provided, emotional support provided. Spiritual Assessment/Intervention/Outcomes:    Encounter Summary  Encounter Overview/Reason : Spiritual/Emotional Needs  Service Provided For[de-identified] Patient  Referral/Consult From[de-identified] Physician  Support System: Spouse,Family members,Mandaen/vicky community  Complexity of Encounter: Moderate  Begin Time: 1600  End Time : 1645  Total Time Calculated: 45 min     Crisis  Type: Emotional distress  Spiritual/Emotional needs  Type: Spiritual Distress,Emotional Distress        Primary Decision Maker (Healthcare Proxy)  Patient's Healthcare Decision Maker is[de-identified] Legal Next of Kin           Values / Beliefs  Do You Have Any Ethnic, Cultural, Sacramental, or Spiritual Pentecostal Needs You Would Like Us To Be Aware of While You Are in the Hospital : Yes    Care Plan:    Ongoing support. Spiritual Care Services   Electronically signed by Dax Hussein on 4/23/22 at 5:02 PM EDT     To reach a  for emotional and spiritual support, place an [facility] consult request.   If a  is needed immediately, dial 0 and ask to page the on-call .

## 2022-04-24 NOTE — PROGRESS NOTES
used, education given to patient about medications ordered. Pt is receptive to the  and understand usage for each medication ordered. Pt is A+OX4 and vital signs are stable. Blood sugar was elevated throughout the night and pt received 1unit of humolog and ordered lantus. Continue to monitor.

## 2022-04-24 NOTE — PROGRESS NOTES
[facility] EMERGENCY [facility] AT [place] Respiratory Therapy Evaluation   Current Order:  Albuterol Q6 PRN      Home Regimen: PRN      Ordering Physician: Carlos Carias  Re-evaluation Date:  ---     Diagnosis: Abdominal Pain     Patient Status: Stable / Unstable + Physician notified    The following MDI Criteria must be met in order to convert aerosol to MDI with spacer. If unable to meet, MDI will be converted to aerosol:  []  Patient able to demonstrate the ability to use MDI effectively  []  Patient alert and cooperative  []  Patient able to take deep breath with 5-10 second hold  []  Medication(s) available in this delivery method   []  Peak flow greater than or equal to 200 ml/min            Current Order Substituted To  (same drug, same frequency)   Aerosol to MDI [] Albuterol Sulfate 0.083% unit dose by aerosol Albuterol Sulfate MDI 2 puffs by inhalation with spacer    [] Levalbuterol 1.25 mg unit dose by aerosol Levalbuterol MDI 2 puffs by inhalation with spacer    [] Levalbuterol 0.63 mg unit dose by aerosol Levalbuterol MDI 2 puffs by inhalation with spacer    [] Ipratropium Bromide 0.02% unit dose by aerosol Ipratropium Bromide MDI 2 puffs by inhalation with spacer    [] Duoneb (Ipratropium + Albuterol) unit dose by aerosol Ipratropium MDI + Albuterol MDI 2 puffs by inhalation w/spacer   MDI to Aerosol [] Albuterol Sulfate MDI Albuterol Sulfate 0.083% unit dose by aerosol    [] Levalbuterol MDI 2 puffs by inhalation Levalbuterol 1.25 mg unit dose by aerosol    [] Ipratropium Bromide MDI by inhalation Ipratropium Bromide 0.02% unit dose by aerosol    [] Combivent (Ipratropium + Albuterol) MDI by inhalation Duoneb (Ipratropium + Albuterol) unit dose by aerosol       Treatment Assessment [Frequency/Schedule]:  Change frequency to: ____________No Changes______________________________________per Protocol, P&T, MEC      Points 0 1 2 3 4   Pulmonary Status  Non-Smoker  []   Smoking history   < 20 pack years  []   Smoking history  ?  20 pack years  []   Pulmonary Disorder  (acute or chronic)  [x]   Severe or Chronic w/ Exacerbation  []     Surgical Status No [x]   Surgeries     General []   Surgery Lower []   Abdominal Thoracic or []   Upper Abdominal Thoracic with  PulmonaryDisorder  []     Chest X-ray Clear/Not  Ordered     [x]  Chronic Changes  Results Pending  []  Infiltrates, atelectasis, pleural effusion, or edema  []  Infiltrates in more than one lobe []  Infiltrate + Atelectasis, &/or pleural effusion  []    Respiratory Pattern Regular,  RR = 12-20 [x]  Increased,  RR = 21-25 []  HARRIS, irregular,  or RR = 26-30 []  Decreased FEV1  or RR = 31-35 []  Severe SOB, use  of accessory muscles, or RR ? 35  []    Mental Status Alert, oriented,  Cooperative [x]  Confused but Follows commands []  Lethargic or unable to follow commands []  Obtunded  []  Comatose  []    Breath Sounds Clear to  auscultation  [x]  Decreased unilaterally or  in bases only []  Decreased  bilaterally  []  Crackles or intermittent wheezes []  Wheezes []    Cough Strong, Spontan., & nonproductive [x]  Strong,  spontaneous, &  productive []  Weak,  Nonproductive []  Weak, productive or  with wheezes []  No spontaneous  cough or may require suctioning []    Level of Activity Ambulatory [x]  Ambulatory w/ Assist  []  Non-ambulatory []  Paraplegic []  Quadriplegic []    Total    Score:___3____     Triage Score:____5____      Tri       Triage:     1. (>20) Freq: Q3    2. (16-20) Freq: Q4   3. (11-15) Freq: QID & Albuterol Q2 PRN    4. (6-10) Freq: TID & Albuterol Q2 PRN    5. (0-5) Freq Q4prn

## 2022-04-24 NOTE — CARE COORDINATION
DISCHARGE PLAN REMAINS HOME WITH FRATERNAL HOME CARE WHEN STABLE.   REQUESTING WALKER AND [address]

## 2022-04-24 NOTE — PLAN OF CARE
Problem: Pain  Goal: Verbalizes/displays adequate comfort level or baseline comfort level  Outcome: Progressing     Problem: ABCDS Injury Assessment  Goal: Absence of physical injury  Outcome: Progressing

## 2022-04-24 NOTE — FLOWSHEET NOTE
Pt sitting on the side of bed eating dinner. Pt rates pain 1/10 at this time. Pt denies need for pain medication. No s/s of distress noted. Call light within reach. Will continue to monitor.

## 2022-04-24 NOTE — PROGRESS NOTES
Patient seen    Clinical course reviewed. She has had persistent right upper quadrant pain with equivocal findings on ultrasound for acute cholecystitis. I was consulted for surgical treatment. Patient was on aspirin and Brilinta for underlying cardiac disease. Upon entering the room, patient was sleeping. She is apparently been having issues with abdominal pain so I did not wake her at this time. I have talked about this case with Carlos Bentley regarding inpatient cholecystectomy.     I have made arrangements for tomorrow for laparoscopic cholecystectomy with cholangiogram    Full consult to follow

## 2022-04-24 NOTE — PROGRESS NOTES
Progress Note  Date:2022       Room:[place] [id number]  Patient Talib Leal     YOB: 1955     Age:66 y.o. Subjective    Subjective:  Symptoms:  She reports malaise. No shortness of breath, cough, chest pain, weakness, headache, chest pressure, anorexia, diarrhea or anxiety. Diet:  No nausea or vomiting. Review of Systems   Respiratory: Negative for cough and shortness of breath. Cardiovascular: Negative for chest pain. Gastrointestinal: Negative for anorexia, diarrhea, nausea and vomiting. Neurological: Negative for weakness. Objective         Vitals Last 24 Hours:  TEMPERATURE:  Temp  Av.2 °F (36.2 °C)  Min: 97.2 °F (36.2 °C)  Max: 97.2 °F (36.2 °C)  RESPIRATIONS RANGE: Resp  Av  Min: 16  Max: 18  PULSE OXIMETRY RANGE: SpO2  Av %  Min: 96 %  Max: 100 %  PULSE RANGE: Pulse  Av.5  Min: 66  Max: 75  BLOOD PRESSURE RANGE: Systolic (96SUQ), KGH:565 , Min:106 , GMJ:788   ; Diastolic (88WDY), ILR:41, Min:54, Max:65    I/O (24Hr): Intake/Output Summary (Last 24 hours) at 2022 1141  Last data filed at 2022 1814  Gross per 24 hour   Intake 120 ml   Output    Net 120 ml     Objective:  General Appearance:  Comfortable, well-appearing and in no acute distress. Vital signs: (most recent): Blood pressure 106/65, pulse 66, temperature 97.2 °F (36.2 °C), temperature source Oral, resp. rate 16, height 5' 4\" (1.626 m), weight 225 lb (102.1 kg), SpO2 96 %, not currently breastfeeding. HEENT: Normal HEENT exam.    Heart: S1 normal and S2 normal.    Abdomen: Abdomen is soft. There is right upper quadrant tenderness. Pulses: Distal pulses are intact. Neurological: Patient is alert. Pupils:  Pupils are equal, round, and reactive to light. Skin:  Warm and dry.       Labs/Imaging/Diagnostics    Labs:  CBC:  Recent Labs     22  2130 22  0608   WBC 8.9 9.0   RBC 4.73 4.69   HGB 11.6* 11.5*   HCT 34.7* 35.0*   MCV 73.4* 74.7*   RDW 19.4* 19.5*    205     CHEMISTRIES:  Recent Labs     04/22/22 2130 04/23/22  0727 04/24/22  0706    137 136   K 4.8 5.0* 4.2   CL 98 104 106   CO2 28 18* 22   BUN 31* 34* 29*   CREATININE 1.77* 1.58* 1.36*   GLUCOSE 190* 169* 113*     PT/INR:No results for input(s): PROTIME, INR in the last 72 hours. APTT:No results for input(s): APTT in the last 72 hours. LIVER PROFILE:  Recent Labs     04/22/22 2130 04/23/22  0727 04/24/22  0706   AST 19 20 15   ALT 13 13 10   BILITOT 0.4 0.4 0.5   ALKPHOS 112 107 81       Imaging Last 24 Hours:  CT ABDOMEN PELVIS WO CONTRAST Additional Contrast? None    Result Date: 4/23/2022  EXAM:  CT ABDOMEN PELVIS WO CONTRAST History: Abdominal pain Technique: Multiple contiguous axial images were obtained of the abdomen and pelvis from the level of the lung bases through the ischial tuberosities without contrast. Multiplanar reformats were obtained. Comparison: CT abdomen pelvis April 20, 2022 Findings: Lung bases are clear. Heart size is enlarged. Mitral annular calcification. Evidence of prior thoracic surgery. Lack of intravenous contrast precludes optimal evaluation of the abdominal and pelvic viscera. The unenhanced liver, spleen, stomach, pancreas, and adrenal glands appear within normal limits. The gallbladder is mildly distended but not significant changed from recent CT. There are debris is present within the gallbladder lumen. No gallbladder wall thickening or pericholecystic fluid identified by CT. Mild bilateral perinephric stranding. No urinary tract calculi or hydronephrosis. The urinary bladder is decompressed. The uterus is absent. Abdominal aorta is nonaneurysmal  . No retroperitoneal or abdominal/pelvic lymphadenopathy. No small bowel obstruction. No overt colonic mass or pericolonic inflammation. No findings of acute appendicitis No free fluid, loculated fluid collection, or pneumoperitoneum. No acute osseous abnormality.      Mildly distended gallbladder containing gallbladder sludge and/or tiny gallstones without significant interval change since April 20, 2022 CT. No CT findings of acute cholecystitis. Mild bilateral perinephric stranding is nonspecific. Correlation for polynephritis is recommended. All CT scans at this facility use dose modulation, iterative reconstruction, and/or weight based dosing when appropriate to reduce radiation dose to as low as reasonably achievable. US ABDOMEN LIMITED    Result Date: 4/23/2022  EXAM: US ABDOMEN LIMITED HISTORY: Right upper quadrant pain TECHNIQUE: Ultrasound evaluation was performed of the right upper quadrant of the abdomen. COMPARISON: CT abdomen pelvis April 23, 2022 FINDINGS: Normal echogenicity and contour of the liver. No liver lesion or intrahepatic biliary dilatation. Main portal vein is patent. The gallbladder is mildly distended. Layering echogenic material is identified within the lumen of the gallbladder. No pericholecystic fluid. Gallbladder wall thickness is at the upper limits of normal measuring 2.7 mm. Negative Velez sign reported. Common bile duct is normal in diameter measuring 4 mm. No overt abnormality of the pancreas. Nonspecific findings of the gallbladder including mildly distended gallbladder containing layering sludge and/or tiny gallstones with wall thickness of the upper limits of normal measuring 2.7 mm. No pericholecystic fluid to suggest acute cholecystitis. Assessment//Plan           Hospital Problems           Last Modified POA    * (Principal) Abdominal pain 4/23/2022 Yes      acute heather  CKD 3  Diabetes  CAD  Assessment & Plan  4/24: Tegretol and aspirin for now as per surgery recommendation. For surgery for tomorrow. Sugars better controlled. She has right upper quadrant pain. Possible cholecystectomy tomorrow.   Electronically signed by Bebe Da Silva MD on 4/24/22 at 11:41 AM EDT

## 2022-04-25 NOTE — FLOWSHEET NOTE
Patient is resting quietly in bed,no complain of pain and her breathing is effortless. 23:00:patient consent for the incoming lap heather surgery was obtained,,it was obtained via  Video ,it was witnessed by Yolanda Leroy R.N. unit manager and Obdulia Barnett pt. Primary nurse. 0002: inserted a no. 20 gauge angiocatheter into he left medial vein on one attempt,she tolerated the procedure well. Sh was instructed not to take anything by mouth,she voiced understanding.

## 2022-04-25 NOTE — PROGRESS NOTES
Called to pacu to place pt on vent. Vent settings given by Dr. Yvonne Hilario anesthesia ,: simv 500, rr 11, fio2 40%, psv 15. Pt placed on vent then extubated per Dr Yvonne Hilario. Pt placed on NRB by nsg, pt noted to have no distress.

## 2022-04-25 NOTE — OP NOTE
[address]                      [address], [address] [facility]                                OPERATIVE REPORT    PATIENT NAME: Lazara Coleman              :        1955  MED REC NO:   [id number]                            ROOM:       [id number]  ACCOUNT NO:   [de-identified]                           ADMIT DATE: 2022  PROVIDER:     Festus Ventura MD    DATE OF PROCEDURE:  2022    PREOPERATIVE DIAGNOSIS:  Acute cholecystitis. POSTOPERATIVE DIAGNOSIS:  Acute cholecystitis. PROCEDURES PERFORMED:  1. Laparoscopic cholecystectomy. 2.  Intraoperative cholangiogram.    SURGEON:  Festus Ventura MD    ASSISTANT:  Ms. Teresa Villa. ANESTHESIA:  1. General endotracheal anesthesia. 2.  Right-sided TAP block. ESTIMATED BLOOD LOSS:  100 mL. SPECIMENS:  Gallbladder. COMPLICATIONS:  None. INDICATION:  A 72-year-old obese female with clinical history, physical  exam, and ultrasound findings suspicious for acute cholecystitis,  secondary to refractory right upper quadrant pain, I discussed the risks  and benefits with the aid of the  regarding laparoscopic  possible open cholecystectomy. Risks of surgery including infection,  bleeding, damage to the common bile duct, bile leak, reoperation and  failure to relieve symptoms were all addressed. Given her additional  comorbidities, risks were discussed well. Nonoperative alternatives  were given. She wished to proceed with cholecystectomy. Consent obtained. OPERATIVE PROCEDURE:  She was taken to the operating room, placed in the  supine position. General endotracheal anesthesia was administered and  right-sided TAP block placed. Abdomen was prepped and draped with a  ChloraPrep-containing solution. She received Ancef preoperatively. A  time-out was taken for appropriate verification.     A 5-mm supraumbilical incision was made, an optical trocar was placed  and pneumoperitoneum was established. Epigastric and two lateral ports  were placed without problems. The gallbladder was distended and mildly  inflamed. It was held with the fundus as well as the neck. The cystic  duct was identified and circumferentiated. A distal clip was placed on  the cystic duct and a hole was made in the cystic duct using scissors. A cholangiogram catheter was inserted and secured. Under real time fluoroscopy, an intraoperative cholangiogram showed no  filling defects and free flow of contrast into the duodenum. The  cholangiogram catheter was removed and three secure proximal clips were  placed on the cystic duct and it was cut with scissors. The cystic artery was clipped and cut in a similar fashion. The  gallbladder was removed from the liver bed using electrocautery. It was  placed in an EndoCatch bag and brought out the subxiphoid port site and  sent to Pathology in formalin for permanent section. Excellent hemostasis was achieved and assured in the gallbladder bed  using cautery as well as Surgicel. I placed a right upper quadrant  drain and brought it out laterally just to evaluate the output for 24  hours. The drain was sutured to the skin. It was placed to bulb  suction. After another 10 minutes, there was no further bleeding or bile leak  seen. At this time; the lap, needle, and instrument counts were all correct. The remainder of the laparoscopic exam was unremarkable. There was  bilious fluid above the liver, which was irrigated well and aspirated. Pneumoperitoneum was released and all the ports were removed. The epigastric port site was closed with interrupted 0 Vicryl suture. The skin incisions were all closed with staples. Dressings were  applied. Following closure, lap, needle, instrument counts were all correct. The patient was taken to recovery room for postop monitoring.         Jessica Way MD    D: 04/25/2022 14:46:30 T:  04/25/2022 14:48:55     [email or web address]  Job#: [id number]     Doc#: [id number]    CC:

## 2022-04-25 NOTE — ANESTHESIA POSTPROCEDURE EVALUATION
Department of Anesthesiology  Postprocedure Note    Patient: Camron Silvestre  MRN: [id number]  YOB: 1955  Date of evaluation: 4/25/2022  Time:  2:57 PM     Procedure Summary     Date: 04/25/22 Room / Location: [address]    Anesthesia Start: 3061 Anesthesia Stop:     Procedure: LAPAROSCOPIC CHOLECYSTECTOMY (N/A Abdomen) Diagnosis: (ABDOMINAL PAIN RIGHT UPPER QUADRANT)    Surgeons: Liu Krishnan MD Responsible Provider: Vel Spence MD    Anesthesia Type: general, regional ASA Status: 4          Anesthesia Type: No value filed. Kristen Phase I: Kristen Score: 2    Kristen Phase II:      Last vitals: Reviewed and per EMR flowsheets. Anesthesia Post Evaluation    Patient location during evaluation: PACU  Patient participation: waiting for patient participation  Level of consciousness: responsive to physical stimuli and sedated and ventilated  Pain score: 1  Airway patency: patent  Nausea & Vomiting: no nausea and no vomiting  Complications: no  Cardiovascular status: hemodynamically stable  Respiratory status: acceptable, ventilator and intubated  Hydration status: euvolemic  Comments: Report to RN, patient keep intubated, om vent.  Vital signs are stable  Multimodal analgesia pain management approach

## 2022-04-25 NOTE — PROGRESS NOTES
Pt to short stay, lethargic, unable to open eyes, Dr Renuka Suarez present, accucheck 76, 1/2 amp D50 given.  Ehsan Cabral used to translate. No family present.

## 2022-04-25 NOTE — PROGRESS NOTES
Two large bandaids, two small bandaids D&I to abd. Abd. Soft o palp. Dressing around [address] D&I.

## 2022-04-25 NOTE — ANESTHESIA PROCEDURE NOTES
Peripheral Block    Patient location during procedure: pre-op  Start time: 4/25/2022 12:52 PM  End time: 4/25/2022 12:56 PM  Staffing  Anesthesiologist: Rainer Meraz MD  Preanesthetic Checklist  Completed: patient identified, IV checked, site marked, risks and benefits discussed, surgical consent, monitors and equipment checked, pre-op evaluation, timeout performed, anesthesia consent given, oxygen available and patient being monitored  Peripheral Block  Patient position: supine  Prep: ChloraPrep  Patient monitoring: cardiac monitor, continuous pulse ox, frequent blood pressure checks and IV access  Block type: TAP  Laterality: right  Injection technique: single-shot  Guidance: nerve stimulator and ultrasound guided  Local infiltration: bupivacaine  Infiltration strength: 0.25 %  Dose: 30 mL  Provider prep: mask and sterile gloves (Sterile probe cover)  Local infiltration: bupivacaine  Needle  Needle type: combined needle/nerve stimulator   Needle gauge: 22 G  Needle length: 5 cm  Needle localization: anatomical landmarks and ultrasound guidance  Assessment  Injection assessment: negative aspiration for heme, no paresthesia on injection and local visualized surrounding nerve on ultrasound  Paresthesia pain: immediately resolved  Slow fractionated injection: yes  Hemodynamics: stable  Additional Notes  Ultrasound image printed and saved in patient chart.     Sterile probe cover used    Reason for block: post-op pain management and at surgeon's request

## 2022-04-25 NOTE — BRIEF OP NOTE
Brief Postoperative Note      Patient: Lucy Sage  YOB: 1955  MRN: [id number]    Date of Procedure: 4/25/2022    Pre-Op Diagnosis: Acute cholecystitis    Post-Op Diagnosis: Same       Procedure(s):  LAPAROSCOPIC CHOLECYSTECTOMY    Surgeon(s):  Gio Matos MD    Assistant:  First Assistant: Geraldine Elizabeth    Anesthesia: General    Estimated Blood Loss (mL): 830    Complications: None    Specimens:   ID Type Source Tests Collected by Time Destination   A : gallbladder Tissue Gallbladder SURGICAL PATHOLOGY Gio Matos MD 4/25/2022 1312        Implants:  * No implants in log *      Drains:   Closed/Suction Drain Superior Abdomen Bulb (Active)       Findings: Acute cholecystitis, intraoperative cholangiogram unremarkable    Electronically signed by Estephanie Regan MD on 4/25/2022 at 2:31 PM

## 2022-04-25 NOTE — CARE COORDINATION
4/25 DC Planning Updates:    Patient has been down for surgery (lap heather) since early this morning, spouse in room waiting for her return. Spouse states he believes she will come home post-DC with C as before but wants CM to speak with the patient. Patient had previously told CM that she would like a walker and BSC at DC. CM/SW to meet with patient tomorrow to review DC plan.

## 2022-04-25 NOTE — PROGRESS NOTES
Patient assessed and charted on by this RN. NPO since midnight. Report called to short stay. BP low at 98/54 and Blood sugar 72. Short stay aware. Patient currently off floor. Will continue to monitor when she returns. Patient labs are back and Dr. Aliyah Granados notified. 0543 - Patient returned to [facility] room 475. VSS. Back on Nasal Cannula 3 Liters which patient states she wears at home. Daughter at bedside. Used  and answered all questions. Explained pain control and purpose of administrating lokelma. Explained purpose of LA drain. Drained 100ml of bloody drainage. Will continue to monitor.

## 2022-04-25 NOTE — ANESTHESIA PRE PROCEDURE
Department of Anesthesiology  Preprocedure Note       Name:  Josee Munguia   Age:  61 y.o.  :  1955                                          MRN:  [id number]         Date:  2022      Surgeon: Jocelyne Mayes):  Liz Herrera MD    Procedure: Procedure(s):  LAPAROSCOPIC CHOLECYSTECTOMY    Medications prior to admission:   Prior to Admission medications    Medication Sig Start Date End Date Taking?  Authorizing Provider   dicyclomine (BENTYL) 10 MG capsule Take 1 capsule by mouth every 6 hours as needed (cramps) 22   Yi Presley PA-C   ondansetron (ZOFRAN ODT) 4 MG disintegrating tablet Take 1 tablet by mouth every 8 hours as needed for Nausea 22   August Hess PA-C   albuterol sulfate HFA (VENTOLIN HFA) 108 (90 Base) MCG/ACT inhaler Inhale 2 puffs into the lungs as needed for Wheezing Every 4-6 hours PRN 22   Jorge L Graham MD   albuterol (PROVENTIL) (2.5 MG/3ML) 0.083% nebulizer solution Take 3 mLs by nebulization every 6 hours as needed for Wheezing 22  Jorge L Graham MD   montelukast (SINGULAIR) 10 MG tablet Take 1 tablet by mouth nightly 22   Jorge L Graham MD   Continuous Blood Gluc Sensor (FREESTYLE FELY 14 DAY SENSOR) MISC Every 2 weeks 3/30/22   Víctor Gordon MD   insulin glargine (LANTUS SOLOSTAR) 100 UNIT/ML injection pen 60 units at bedtime 3/30/22   Víctor Gordon MD   insulin lispro, 1 Unit Dial, (HUMALOG KWIKPEN) 100 UNIT/ML SOPN 15  units at each meals 3/30/22   Víctor Gordon MD   Dulaglutide (TRULICITY) 7.40 OR/3.1CT SOPN Inject 0.75 mg into the skin once a week 3/30/22   Víctor Gordon MD   levothyroxine (SYNTHROID) 50 MCG tablet take 1 tablet by mouth once daily 3/30/22   Víctor Gordon MD   oxybutynin (DITROPAN-XL) 5 MG extended release tablet take 1 tablet by mouth once daily 22   Blas Matt MD   atorvastatin (LIPITOR) 40 MG tablet take 2 tablets by mouth daily 21   Historical Provider, MD   busPIRone (BUSPAR) 10 MG tablet 12/29/21   Historical Provider, MD   doxepin (SINEQUAN) 100 MG capsule  12/29/21   Historical Provider, MD   prazosin (MINIPRESS) 2 MG capsule  12/21/21   Historical Provider, MD   Alcohol Swabs (ALCOHOL PREP) 70 % PADS qid 12/30/21   Chadd Cuevas MD   blood glucose test strips (FREESTYLE LITE) strip 1 each by In Vitro route daily As needed. 12/30/21   Chadd Cuevas MD   Continuous Blood Gluc Sensor (FREESTYLE FELY 14 DAY SENSOR) MISC Every 2 weeks 12/30/21   Chadd Cuevas MD   Continuous Blood Gluc  (FREESTYLE FELY 14 DAY READER) CATHLEEN As directed 12/30/21   Chadd Cuevas MD   FreeStyle Lancets MISC 1 each by Does not apply route daily 12/30/21   Chadd Cuevas MD   Insulin Pen Needle ([employer] PEN NEEDLES 31G) 31G X 8 MM MISC 1 each by Does not apply route 4 times daily 12/30/21   Chadd Cuevas MD   oxyCODONE-acetaminophen (PERCOCET) 5-325 MG per tablet Take 1 tablet by mouth every 4 hours as needed for Pain. Historical Provider, MD   Insulin Pen Needle (NOVOFINE) 32G X 6 MM MISC qid 9/22/21   Chadd Cuevas MD   ammonium lactate (LAC-HYDRIN) 12 % lotion  6/23/21   Historical Provider, MD   atorvastatin (LIPITOR) 80 MG tablet take 1 tablet by mouth at bedtime 8/6/21   Historical Provider, MD   diclofenac sodium (VOLTAREN) 1 % GEL apply 4 grams to affected area three times a day AS NEEDED FOR PAIN 8/22/21   Historical Provider, MD   doxepin (SINEQUAN) 25 MG capsule 75 mg nightly  8/6/21   Historical Provider, MD   ranolazine (RANEXA) 1000 MG extended release tablet Take 1 tablet by mouth 2 times daily 8/25/21   Susan Grant MD   gabapentin (NEURONTIN) 600 MG tablet Take 600 mg by mouth 2 times daily.     Historical Provider, MD   RESTASIS 0.05 % ophthalmic emulsion  3/8/21   Historical Provider, MD   neomycin-polymyxin-dexameth (MAXITROL) 3.5-87537-3.1 ophthalmic suspension instill 1 drop into both eyes four times a day 1/5/21   Historical Provider, MD   ARTIFICIAL TEARS 1.4 % ophthalmic solution  3/8/21 Historical Provider, MD   docusate sodium (COLACE, DULCOLAX) 100 MG CAPS Take 100 mg by mouth 2 times daily  Patient taking differently: Take 200 mg by mouth daily At bedtime. 10/3/20   Jesse Waggoner MD   sertraline (ZOLOFT) 100 MG tablet Take 200 mg by mouth daily  9/14/20   Historical Provider, MD   furosemide (LASIX) 40 MG tablet Take 1 tablet by mouth daily 9/4/20   Antonia Ruiz MD   ticagrelor (BRILINTA) 90 MG TABS tablet Take 90 mg by mouth 2 times daily    Historical Provider, MD   CPAP Machine MISC by Does not apply route New CPAP with 10 cm 8/20/20   Odessa Mckeon MD   aspirin 81 MG EC tablet Take 1 tablet by mouth daily 6/30/20   Marge Rojas MD   OXYGEN 2 lit O2 with sleep , please give O2 concentrator 6/12/20   Odesas Mckeon MD   Emollient (EUCERIN INTENSIVE REPAIR HAND) 2.5-10 % CREA APPLY TO FEET AT BEDTIME; PLACE SOCKS OVER FEET AFTER APPLICATION IF NEEDED FOR DRY CRACKING FEET 3/15/20   Historical Provider, MD   hydrOXYzine (VISTARIL) 25 MG capsule Take 50 mg by mouth 3 times daily as needed  3/4/20   Historical Provider, MD   Nutritional Supplements ([address]) LIQD take as directed three times a day 6/1/20   Historical Provider, MD   isosorbide dinitrate (ISORDIL) 20 MG tablet Take 1 tablet by mouth 3 times daily 4/28/20   Marge Rojas MD   nitroGLYCERIN (NITROSTAT) 0.4 MG SL tablet up to max of 3 total doses.  If no relief after 1 dose, call 911. 4/28/20   Marge Rojas MD   hydrALAZINE (APRESOLINE) 50 MG tablet Take 1 tablet by mouth every 8 hours 4/28/20   Marge Rojas MD   metoprolol succinate (TOPROL XL) 50 MG extended release tablet Take 1 tablet by mouth 2 times daily 4/28/20   Marge Rojas MD   haloperidol (HALDOL) 5 MG tablet Take 5 mg by mouth daily    Historical Provider, MD   folic acid (FOLVITE) 1 MG tablet Take 1 mg by mouth daily    Historical Provider, MD   Iron Polysacch [id number] (NIFEREX-150 FORTE PO) Take 1 tablet by mouth daily     Historical Provider, MD   Cyanocobalamin (VITAMIN B-12) 1000 MCG extended release tablet Take 1,000 mcg by mouth daily    Historical Provider, MD   meclizine (ANTIVERT) 25 MG tablet Take 25 mg by mouth 2 times daily     Historical Provider, MD       Current medications:    Current Facility-Administered Medications   Medication Dose Route Frequency Provider Last Rate Last Admin    dextrose 50 % IV solution  12.5 g IntraVENous PRN Jeri Moreira MD   12.5 g at 04/25/22 0950    lactated ringers infusion             sodium chloride flush 0.9 % injection 5-40 mL  5-40 mL IntraVENous 2 times per day Mihai Cuadra MD   10 mL at 04/24/22 2102    sodium chloride flush 0.9 % injection 5-40 mL  5-40 mL IntraVENous PRN Mihai Cuadra MD        0.9 % sodium chloride infusion  25 mL IntraVENous PRN Mihai Cuadra MD        enoxaparin Sodium (LOVENOX) injection 30 mg  30 mg SubCUTAneous BID Mihai Cuadra MD   30 mg at 04/24/22 2108    ondansetron (ZOFRAN-ODT) disintegrating tablet 4 mg  4 mg Oral Q8H PRN Mihai Cuadra MD        Or    ondansetron [facility]) injection 4 mg  4 mg IntraVENous Q6H PRN Mihai Cuadra MD   4 mg at 04/24/22 1437    polyethylene glycol (GLYCOLAX) packet 17 g  17 g Oral Daily PRN Mihai Cuadra MD        acetaminophen (TYLENOL) tablet 650 mg  650 mg Oral Q6H PRN Mihai Cuadra MD        Or    acetaminophen (TYLENOL) suppository 650 mg  650 mg Rectal Q6H PRN Mihai Cuadra MD        morphine (PF) injection 1 mg  1 mg IntraVENous Q3H PRN Mihai Cuadra MD   1 mg at 04/23/22 0538    0.9 % sodium chloride infusion   IntraVENous Continuous Nicole Moreira  mL/hr at 04/25/22 0325 New Bag at 04/25/22 0325    albuterol (PROVENTIL) nebulizer solution 2.5 mg  2.5 mg Nebulization Q6H PRN Nicole Moreira MD        albuterol sulfate  (90 Base) MCG/ACT inhaler 2 puff  2 puff Inhalation PRN Nicole Moreira MD        sertraline (ZOLOFT) tablet 200 mg  200 mg Oral Daily Seema Love Daniel Dang MD   200 mg at 04/24/22 0815    prazosin (MINIPRESS) capsule 2 mg  2 mg Oral Nightly Hammad Connolly MD   2 mg at 04/24/22 2114    oxybutynin (DITROPAN-XL) extended release tablet 5 mg  5 mg Oral Nightly Hammad Connolly MD   5 mg at 04/24/22 2114    montelukast (SINGULAIR) tablet 10 mg  10 mg Oral Nightly Hammad Connolly MD   10 mg at 04/24/22 2114    metoprolol succinate (TOPROL XL) extended release tablet 50 mg  50 mg Oral BID Hammad Connolly MD   50 mg at 04/24/22 2107    levothyroxine (SYNTHROID) tablet 50 mcg  50 mcg Oral Daily Hammad Connolly MD   50 mcg at 04/25/22 6977    isosorbide dinitrate (ISORDIL) tablet 20 mg  20 mg Oral TID Hammad Connolly MD   20 mg at 04/24/22 2114    hydrOXYzine (VISTARIL) capsule 50 mg  50 mg Oral TID PRN Hammad Connolly MD        haloperidol (HALDOL) tablet 5 mg  5 mg Oral Daily Hammad Connolly MD   5 mg at 04/24/22 0820    gabapentin (NEURONTIN) capsule 600 mg  600 mg Oral BID Hammad Connolly MD   600 mg at 04/24/22 2107    [Held by provider] furosemide (LASIX) tablet 40 mg  40 mg Oral Daily Hammad Connolly MD   40 mg at 04/24/22 0815    doxepin (SINEQUAN) capsule 150 mg  150 mg Oral Nightly Hammad Connolly MD   150 mg at 04/24/22 2107    dicyclomine (BENTYL) capsule 10 mg  10 mg Oral Q6H PRN Hammad Connolly MD        busPIRone (BUSPAR) tablet 10 mg  10 mg Oral BID Hammad Connolly MD   10 mg at 04/24/22 2114    atorvastatin (LIPITOR) tablet 80 mg  80 mg Oral Nightly Hammad Connolly MD   80 mg at 04/24/22 2107    polyvinyl alcohol (LIQUIFILM TEARS) 1.4 % ophthalmic solution 1 drop  1 drop Both Eyes BID Hammad Connolly MD   1 drop at 04/24/22 2304    ranolazine (RANEXA) extended release tablet 1,000 mg  1,000 mg Oral BID Hammad Connolly MD   1,000 mg at 04/24/22 2114    oxyCODONE-acetaminophen (PERCOCET) 5-325 MG per tablet 1 tablet  1 tablet Oral Q4H PRN Hammad Connolly MD        meclizine (ANTIVERT) tablet 25 mg  25 mg Oral BID Hammad Connolly MD   25 mg at 04/24/22 1656    insulin lispro (HUMALOG) injection vial 0-6 Units  0-6 Units SubCUTAneous TID  Sebastien Pro MD        insulin lispro (HUMALOG) injection vial 0-3 Units  0-3 Units SubCUTAneous Nightly Sebastien Pro MD   1 Units at 04/23/22 2147    glucose (GLUTOSE) 40 % oral gel 15 g  15 g Oral PRN Sebastien Pro MD        glucagon (rDNA) injection 1 mg  1 mg IntraMUSCular PRN Sebastien Pro MD        dextrose 5 % solution  100 mL/hr IntraVENous PRN Sebastien Pro MD        hydrALAZINE (APRESOLINE) tablet 50 mg  50 mg Oral 3 times per day Sebastien Pro MD   50 mg at 04/24/22 2107    insulin glargine (LANTUS) injection vial 40 Units  40 Units SubCUTAneous Nightly Sebastien Pro MD   40 Units at 04/24/22 2300       Allergies:     Allergies   Allergen Reactions    Ambien [Zolpidem Tartrate]     Capoten [Captopril]     Clioquinol     Cogentin [Benztropine]     Depakote [Divalproex Sodium]     Effexor Xr [Venlafaxine Hcl Er]     Geodon [Ziprasidone Hcl]     Lisinopril      Hyperkalemia: 4/21/20 potassium was 6.7    Lyrica [Pregabalin]     Navane [Thiothixene]     Pamelor [Nortriptyline Hcl]     Remeron [Mirtazapine]     Risperdal [Risperidone]     Trazodone And Nefazodone     Wellbutrin [Bupropion]        Problem List:    Patient Active Problem List   Diagnosis Code    Major depressive disorder, recurrent, severe with psychotic symptoms ([facility] F33.3    Diabetes mellitus ([facility] E11.9    Hypertension I10    HLD (hyperlipidemia) E78.5    Thyroid disease E07.9    Congestive heart failure (HCC) I50.9    Non-pressure ulcer of toe (HCC) L97.509    Atherosclerotic PVD with intermittent claudication (HCC) I70.219    Osteomyelitis ([facility]) M86.9    Infection of skin L08.9    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 A49.8    Surgical wound dehiscence, initial encounter T81.31XA    S/P amputation of lesser toe, right ([facility]) Z89.421    Rectal bleeding K62.5    Athscl native arteries of left leg w ulceration oth prt foot ([facility] I70.245    JESICA (obstructive sleep apnea) G47.33    Secondary diabetes mellitus ([facility]) E13.9    Chest pain R07.9    Peripheral vascular disease ([facility]) I73.9    Cellulitis L03.90    Syncope and collapse R55    Nonrheumatic mitral (valve) insufficiency I34.0    Ischemic myocardial dysfunction I25.5    Pulmonary hypertension ([facility]) I27.20    Acute on chronic combined systolic and diastolic congestive heart failure ([facility]) I50.43    Asthma J45.909    RADHA (acute kidney injury) ([facility]) N17.9    Dizziness R42    Acute cerebrovascular accident ([facility] I63.9    Abnormal gait R26.9    Anxiety F41.9    Gastritis, unspecified, without bleeding K29.70    Pure hypercholesterolemia E78.00    Schizophrenia ([facility]) F20.9    Coronary arteriosclerosis I25.10    Extrapyramidal disease G25.9    Gangrene of toe ([facility]) I96    Drug-induced hypotension I95.2    Osteomyelitis of right foot ([facility]) M86.9    Nausea and vomiting R11.2    Mild intermittent asthma without complication K82.27    Heart failure, diastolic, with acute decompensation ([facility]) I50.33    Major depressive disorder, single episode, unspecified F32.9    Type 2 diabetes mellitus with diabetic neuropathy, unspecified ([facility]) E11.40    Obesity (BMI 30-39. 9) E66.9    Disorder of carotid artery ([facility]) I77.9    NSTEMI (non-ST elevated myocardial infarction) ([facility]) I21.4    Pneumonia J18.9    CKD (chronic kidney disease) stage 3, GFR 30-59 ml/min ([facility]) N18.30    Pain in right hand M79.641    Anesthesia of skin R20.0    Carpal tunnel syndrome G56.00    History of angioplasty of peripheral vessel Z98.62    History of coronary artery bypass surgery Z95.1    Paresthesia of skin R20.2    Acute decompensated heart failure ([facility]) I50.9    Depression F32. A    Abdominal pain R10.9       Past Medical History:        Diagnosis Date    Asthma     CAD (coronary artery disease)     CKD (chronic kidney disease) stage 3, GFR 30-59 ml/min ([facility])     Colitis     Diabetes mellitus ([facility]     Hyperlipidemia     Hypertension     PAD (peripheral artery disease) (HCC)     Prolonged emergence from general anesthesia     PVD (peripheral vascular disease) ([facility]     Thyroid disease        Past Surgical History:        Procedure Laterality Date    CARDIAC SURGERY      CATARACT REMOVAL WITH IMPLANT Bilateral 11- 11-     SECTION      COLONOSCOPY N/A 2019    COLONOSCOPY DIAGNOSTIC performed by Jason Paz MD at [address] [place] [address] GRAFT  2019    unknown vessels    HYSTERECTOMY      TOE AMPUTATION Right     3rd toe       Social History:    Social History     Tobacco Use    Smoking status: Never Smoker    Smokeless tobacco: Never Used   Substance Use Topics    Alcohol use: Never                                Counseling given: Not Answered      Vital Signs (Current):   Vitals:    22 0622 22 0915 22 0955 22 1015   BP:  (!) 98/54 (!) 144/79 (!) 154/72   Pulse:  70 66 65   Resp:  18 10 9   Temp:  97.7 °F (36.5 °C)  98.2 °F (36.8 °C)   TempSrc:  Oral  Temporal   SpO2:  100% 98% 100%   Weight: 232 lb 3.2 oz (105.3 kg)      Height:                                                  BP Readings from Last 3 Encounters:   22 (!) 154/72   22 135/69   22 139/88       NPO Status: Time of last liquid consumption: 0000                        Time of last solid consumption: 0000                        Date of last liquid consumption: 22                        Date of last solid food consumption: 22    BMI:   Wt Readings from Last 3 Encounters:   22 232 lb 3.2 oz (105.3 kg)   22 225 lb (102.1 kg)   22 226 lb (102.5 kg)     Body mass index is 39.86 kg/m².     CBC:   Lab Results   Component Value Date    WBC 9.0 2022    RBC 4.69 2022    HGB 11.5 2022    HCT 35.0 2022    MCV 74.7 2022    RDW 19.5 2022     2022       CMP: Lab Results   Component Value Date     04/25/2022    K 5.4 04/25/2022     04/25/2022    CO2 18 04/25/2022    BUN 38 04/25/2022    CREATININE 2.19 04/25/2022    GFRAA 27.3 04/25/2022    LABGLOM 22.6 04/25/2022    GLUCOSE 70 04/25/2022    GLUCOSE 279 01/06/2020    PROT 7.4 04/25/2022    CALCIUM 8.5 04/25/2022    BILITOT 0.6 04/25/2022    ALKPHOS 95 04/25/2022    AST 25 04/25/2022    ALT 13 04/25/2022       POC Tests:   Recent Labs     04/25/22  0941   POCGLU 76       Coags:   Lab Results   Component Value Date    PROTIME 15.2 11/13/2021    INR 1.2 11/13/2021    APTT 32.4 11/13/2021       HCG (If Applicable): No results found for: PREGTESTUR, PREGSERUM, HCG, HCGQUANT     ABGs:   Lab Results   Component Value Date    PHART 7.430 11/13/2021    PO2ART 126 11/13/2021    FOT9JQU 39 11/13/2021    EIL6YAM 25.7 11/13/2021    BEART 1 11/13/2021    [id number] 99 11/13/2021        Type & Screen (If Applicable):  No results found for: LABABO, LABRH    Drug/Infectious Status (If Applicable):  No results found for: HIV, HEPCAB    COVID-19 Screening (If Applicable):   Lab Results   Component Value Date    COVID19 NOT DETECTED 11/13/2021           Anesthesia Evaluation    Airway: Mallampati: III  TM distance: >3 FB   Neck ROM: full  Mouth opening: > = 3 FB Dental: normal exam         Pulmonary: breath sounds clear to auscultation  (+) sleep apnea:  asthma:     (-) pneumonia                           Cardiovascular:    (+) hypertension:, past MI: > 6 months and no interval change, CAD: obstructive and no interval change, CABG/stent: no interval change, CHF: systolic,       ECG reviewed  Rhythm: regular    Echocardiogram reviewed               ROS comment: 8/21/21 TTE   Summary   Compared to study of 6/29/2020, LVEF decreased, RVSP increased. Left ventricular ejection fraction is visually estimated at 35-40%.    Severe anteroseptal hypokinesis   Papillary muscle hypertrophy   Diastolic dysfunction   Mildly enlarged right ventricle cavity. Right ventricle global systolic function is mildly reduced . Right ventricular systolic pressure of 62 mm Hg consistent with severe   pulmonary hypertension. Normal tricuspid valve structure and function. Moderate-to-severe tricuspid regurgitation. Normal pulmonic valve structure   2+ PI, No PS   Normal right atrium. Normal mitral valve structure   Mild MV leaflet thickening   2+ MR   Mildly dilated left atrium. Neuro/Psych:   (+) CVA: residual symptoms, psychiatric history:            GI/Hepatic/Renal:   (+) renal disease: CRI, morbid obesity          Endo/Other:    (+) DiabetesType II DM, poorly controlled, using insulin, . Abdominal:             Vascular: negative vascular ROS. Other Findings:             Anesthesia Plan      general and regional     ASA 4     (US Guided TAP Block)  Induction: intravenous. MIPS: Postoperative opioids intended and Prophylactic antiemetics administered. Anesthetic plan and risks discussed with patient (). Use of blood products discussed with patient whom consented to blood products.    Plan discussed with CRNA and surgical team.    Attending anesthesiologist reviewed and agrees with Preprocedure content              Al Patino MD   4/25/2022

## 2022-04-25 NOTE — PROGRESS NOTES
Progress Note  Date:2022       Room:[place] [id number]  Patient Gisel Patterson     YOB: 1955     Age:66 y.o. Subjective    Subjective:  Symptoms:  She reports malaise. No shortness of breath, cough, chest pain, weakness, headache, chest pressure, anorexia, diarrhea or anxiety. Diet:  No nausea or vomiting. Review of Systems   Respiratory: Negative for cough and shortness of breath. Cardiovascular: Negative for chest pain. Gastrointestinal: Negative for anorexia, diarrhea, nausea and vomiting. Neurological: Negative for weakness. Objective         Vitals Last 24 Hours:  TEMPERATURE:  Temp  Av.6 °F (36.4 °C)  Min: 97 °F (36.1 °C)  Max: 98.2 °F (36.8 °C)  RESPIRATIONS RANGE: Resp  Av.5  Min: 9  Max: 18  PULSE OXIMETRY RANGE: SpO2  Av.4 %  Min: 98 %  Max: 100 %  PULSE RANGE: Pulse  Av.5  Min: 65  Max: 95  BLOOD PRESSURE RANGE: Systolic (25HRL), DSR:660 , Min:94 , GLN:604   ; Diastolic (63OSM), DEV:85, Min:54, Max:81    I/O (24Hr): Intake/Output Summary (Last 24 hours) at 2022 1052  Last data filed at 2022 4583  Gross per 24 hour   Intake 1338 ml   Output    Net 1338 ml     Objective:  General Appearance:  Comfortable, well-appearing and in no acute distress. Vital signs: (most recent): Blood pressure (!) 154/72, pulse 65, temperature 98.2 °F (36.8 °C), temperature source Temporal, resp. rate 9, height 5' 4\" (1.626 m), weight 232 lb 3.2 oz (105.3 kg), SpO2 100 %, not currently breastfeeding. HEENT: Normal HEENT exam.    Heart: S1 normal and S2 normal.    Abdomen: Abdomen is soft. There is right upper quadrant tenderness. Pulses: Distal pulses are intact. Neurological: Patient is alert. Pupils:  Pupils are equal, round, and reactive to light. Skin:  Warm and dry.       Labs/Imaging/Diagnostics    Labs:  CBC:  Recent Labs     22  2130 22  0608   WBC 8.9 9.0   RBC 4.73 4.69   HGB 11.6* 11.5*   HCT 34.7* 35.0*   MCV 73.4* 74.7*   RDW 19.4* 19.5*    205     CHEMISTRIES:  Recent Labs     04/23/22  0727 04/24/22  0706 04/25/22  0750    136 134*   K 5.0* 4.2 5.4*    106 103   CO2 18* 22 18*   BUN 34* 29* 38*   CREATININE 1.58* 1.36* 2.19*   GLUCOSE 169* 113* 70     PT/INR:No results for input(s): PROTIME, INR in the last 72 hours. APTT:No results for input(s): APTT in the last 72 hours. LIVER PROFILE:  Recent Labs     04/23/22  0727 04/24/22  0706 04/25/22  0750   AST 20 15 25   ALT 13 10 13   BILITOT 0.4 0.5 0.6   ALKPHOS 107 81 95       Imaging Last 24 Hours:  CT ABDOMEN PELVIS WO CONTRAST Additional Contrast? None    Result Date: 4/23/2022  EXAM:  CT ABDOMEN PELVIS WO CONTRAST History: Abdominal pain Technique: Multiple contiguous axial images were obtained of the abdomen and pelvis from the level of the lung bases through the ischial tuberosities without contrast. Multiplanar reformats were obtained. Comparison: CT abdomen pelvis April 20, 2022 Findings: Lung bases are clear. Heart size is enlarged. Mitral annular calcification. Evidence of prior thoracic surgery. Lack of intravenous contrast precludes optimal evaluation of the abdominal and pelvic viscera. The unenhanced liver, spleen, stomach, pancreas, and adrenal glands appear within normal limits. The gallbladder is mildly distended but not significant changed from recent CT. There are debris is present within the gallbladder lumen. No gallbladder wall thickening or pericholecystic fluid identified by CT. Mild bilateral perinephric stranding. No urinary tract calculi or hydronephrosis. The urinary bladder is decompressed. The uterus is absent. Abdominal aorta is nonaneurysmal  . No retroperitoneal or abdominal/pelvic lymphadenopathy. No small bowel obstruction. No overt colonic mass or pericolonic inflammation. No findings of acute appendicitis No free fluid, loculated fluid collection, or pneumoperitoneum. No acute osseous abnormality. Mildly distended gallbladder containing gallbladder sludge and/or tiny gallstones without significant interval change since April 20, 2022 CT. No CT findings of acute cholecystitis. Mild bilateral perinephric stranding is nonspecific. Correlation for polynephritis is recommended. All CT scans at this facility use dose modulation, iterative reconstruction, and/or weight based dosing when appropriate to reduce radiation dose to as low as reasonably achievable. US ABDOMEN LIMITED    Result Date: 4/23/2022  EXAM: US ABDOMEN LIMITED HISTORY: Right upper quadrant pain TECHNIQUE: Ultrasound evaluation was performed of the right upper quadrant of the abdomen. COMPARISON: CT abdomen pelvis April 23, 2022 FINDINGS: Normal echogenicity and contour of the liver. No liver lesion or intrahepatic biliary dilatation. Main portal vein is patent. The gallbladder is mildly distended. Layering echogenic material is identified within the lumen of the gallbladder. No pericholecystic fluid. Gallbladder wall thickness is at the upper limits of normal measuring 2.7 mm. Negative Velez sign reported. Common bile duct is normal in diameter measuring 4 mm. No overt abnormality of the pancreas. Nonspecific findings of the gallbladder including mildly distended gallbladder containing layering sludge and/or tiny gallstones with wall thickness of the upper limits of normal measuring 2.7 mm. No pericholecystic fluid to suggest acute cholecystitis. Assessment//Plan           Hospital Problems           Last Modified POA    * (Principal) Abdominal pain 4/23/2022 Yes      acute heather  CKD 3  Diabetes  CAD  RADHA on CKD    Assessment & Plan    4/24: Tegretol and aspirin for now as per surgery recommendation. For surgery for tomorrow. Sugars better controlled. She has right upper quadrant pain. Possible cholecystectomy tomorrow. 4/25: Patient is going for lap heather today. Anticipate discharge tomorrow.   No overnight events no new complaints. Continue current care.hold nephrotoxic agents, cw IVF.   Electronically signed by Tracy Valenzuela MD on 4/24/22 at 11:41 AM EDT No

## 2022-04-25 NOTE — PROGRESS NOTES
Dr Stevenson Landa aware of potassium results Going on for quite some time.  Several months has spoke to Dr. Vaughn about right side of stomach being bigger than the other.  States in the past 2 weeks have been coming on the left of her belly button with movent such as bending and turning.  States has getting more bumps that Dr. Vaughn felt previously in OV.      ABDOMINAL PAIN     Onset: Months.    Description:   Character: Sharp with movement and Dull ache when she touches it.  Location: left side of belly button  Radiation: around to left side    Intensity: states moderate pain.  Would not rate 1-10 scale.    Progression of Symptoms:  worsening    Accompanying Signs & Symptoms:  Fever/Chills?: no   Gas/Bloating: YES- lots of both  Nausea: YES- ongoing which has not changed  Vomitting: no   Diarrhea?: YES on lots of drugs has been ongoing  Constipation:YES due to Oxy and drugs  Dysuria or Hematuria: no    History:   Trauma: no   Previous similar pain: YES- ongoing.           Patient scheduled appointment with provider for today.   Luly Hartmann RN - Triage  [facility]

## 2022-04-25 NOTE — PROGRESS NOTES
Pt appears more alert, still keeping eyes closed.   translater able to understanding pt at this time

## 2022-04-25 NOTE — PROGRESS NOTES
To PACU non responsive, respiratory at bedside, ventilator set up, extubated per Dr Yandel Rangel. Pt minerva well, placed on O2 per non rebreather.

## 2022-04-26 NOTE — CONSULTS
[facility] INPATIENT  CONSULTATION NOTE                                                                                                                                                                                                Reason for Consult  Acute urinary retention    History of Present Illness  62-year-old female with history of frequency urgency was started on oxybutynin XL 5 mg with improved control of her frequency  This may be one of the reasons patient went into urinary retention that is the anticholinergic in addition to narcotics and anesthesia for her laparoscopic cholecystectomy  Recommend we stop oxybutynin order has been written  Patient will be given a dose of tamsulosin tonight  Recommend removal of Colon in the morning        Urologic Review of Systems/Symptoms  Other Urologic: History of frequency urgency    Review of Systems    All 14 categories of Review of Systems otherwise reviewed no other findings reported.     Past Medical History:   Diagnosis Date    Asthma     CAD (coronary artery disease)     CKD (chronic kidney disease) stage 3, GFR 30-59 ml/min ([facility])     Colitis     Diabetes mellitus [facility]     Hyperlipidemia     Hypertension     PAD (peripheral artery disease) ([facility])     Prolonged emergence from general anesthesia     PVD (peripheral vascular disease) ([facility]     Thyroid disease      Past Surgical History:   Procedure Laterality Date    CARDIAC SURGERY      CATARACT REMOVAL WITH IMPLANT Bilateral 11- 11-     SECTION      COLONOSCOPY N/A 2019    COLONOSCOPY DIAGNOSTIC performed by Loki Samayoa MD at [address] [place] [address] GRAFT  2019    unknown vessels    HYSTERECTOMY      TOE AMPUTATION Right     3rd toe     Social History     Socioeconomic History    Marital status:      Spouse name: None    Number of children: None    Years of education: None    Highest education level: None Occupational History    None   Tobacco Use    Smoking status: Never Smoker    Smokeless tobacco: Never Used   Vaping Use    Vaping Use: Never used   Substance and Sexual Activity    Alcohol use: Never    Drug use: Never    Sexual activity: None   Other Topics Concern    None   Social History Narrative         Lives With: Spouse    Type of Home: [address]: One level    Home Access: Elevator    Bathroom Shower/Tub: Tub/Shower unit(Simultaneous filing. User may not have seen previous data.)    Bathroom Equipment: Grab bars in shower, Shower chair    Home Equipment: Rolling walker, [address] bed    ADL Assistance: Needs assistance    Homemaking Assistance: Needs assistance    Homemaking Responsibilities: No    Ambulation Assistance: Independent    Transfer Assistance: Independent    Active : No    Additional Comments: Pt wears orthopedic shoes at baseline    The patient states she is current with [facility](RN per the ). The patient had a walker, but obtained a hospital bed, wheel chair, BSC and O2 last admission (from [address]). pharmacy is [address] [place] [address],[address] [address]., [facility]. Transportation is provided by  Home	[place] () per the patient     Social Determinants of Health     Financial Resource Strain:     Difficulty of Paying Living Expenses: Not on file   Food Insecurity:     Worried About [facility] in the Last Year: Not on file    Shayy of Food in the Last Year: Not on file   Transportation Needs:     Lack of Transportation (Medical): Not on file    Lack of Transportation (Non-Medical):  Not on file   Physical Activity:     Days of Exercise per Week: Not on file    Minutes of Exercise per Session: Not on file   Stress:     Feeling of Stress : Not on file   Social Connections:     Frequency of Communication with Friends and Family: Not on file    Frequency of Social Gatherings with Friends and Family: Not on file    Attends Adventism Services: Not on file    Active Member of Clubs or Organizations: Not on file    Attends Club or Organization Meetings: Not on file    Marital Status: Not on file   Intimate Partner Violence:     Fear of Current or Ex-Partner: Not on file    Emotionally Abused: Not on file    Physically Abused: Not on file    Sexually Abused: Not on file   Housing Stability:     Unable to Pay for Housing in the Last Year: Not on file    Number of [place] in the Last Year: Not on file    Unstable Housing in the Last Year: Not on file     History reviewed. No pertinent family history.   Current Facility-Administered Medications   Medication Dose Route Frequency Provider Last Rate Last Admin    sodium zirconium cyclosilicate (LOKELMA) oral suspension 10 g  10 g Oral BID Olaf Olivarez MD   10 g at 04/26/22 1209    [START ON 4/27/2022] enoxaparin Sodium (LOVENOX) injection 30 mg  30 mg SubCUTAneous Daily Olaf Olivarez MD        dextrose 50 % IV solution  12.5 g IntraVENous PRN Carmita Bashir MD   12.5 g at 04/25/22 1209    dextrose bolus (hypoglycemia) 10% 250 mL  250 mL IntraVENous Once Carmita Bashir MD   Held at 04/25/22 1455    0.9 % sodium chloride infusion  25 mL IntraVENous PRN Carmita Bashir MD        oxyCODONE-acetaminophen (PERCOCET) 5-325 MG per tablet 1 tablet  1 tablet Oral Q4H PRN Carmita Bashir MD        Or    oxyCODONE-acetaminophen (PERCOCET) 5-325 MG per tablet 2 tablet  2 tablet Oral Q4H PRN Carmita Bashir MD   2 tablet at 04/25/22 1808    sodium chloride flush 0.9 % injection 5-40 mL  5-40 mL IntraVENous 2 times per day Carmita Bashir MD   10 mL at 04/24/22 2102    sodium chloride flush 0.9 % injection 5-40 mL  5-40 mL IntraVENous PRN Carmita Bashir MD        0.9 % sodium chloride infusion  25 mL IntraVENous PRN Carmita Bashir MD        ondansetron (ZOFRAN-ODT) disintegrating tablet 4 mg  4 mg Oral Q8H PRN Moi Power MD        Or    ondansetron [facility]) injection 4 mg  4 mg IntraVENous Q6H PRN Moi Power MD   4 mg at 04/24/22 1437    polyethylene glycol (GLYCOLAX) packet 17 g  17 g Oral Daily PRN Moi Power MD        acetaminophen (TYLENOL) tablet 650 mg  650 mg Oral Q6H PRN Moi Power MD        Or   Ozzy Cook acetaminophen (TYLENOL) suppository 650 mg  650 mg Rectal Q6H PRN Moi Power MD        0.9 % sodium chloride infusion   IntraVENous Continuous Moi Power  mL/hr at 04/26/22 0646 New Bag at 04/26/22 0646    albuterol (PROVENTIL) nebulizer solution 2.5 mg  2.5 mg Nebulization Q6H PRN Moi Power MD        albuterol sulfate  (90 Base) MCG/ACT inhaler 2 puff  2 puff Inhalation PRN Moi Power MD        sertraline (ZOLOFT) tablet 200 mg  200 mg Oral Daily Moi Power MD   200 mg at 04/26/22 1014    prazosin (MINIPRESS) capsule 2 mg  2 mg Oral Nightly Moi Power MD   2 mg at 04/25/22 2026    oxybutynin (DITROPAN-XL) extended release tablet 5 mg  5 mg Oral Nightly Moi Power MD   5 mg at 04/25/22 2026    montelukast (SINGULAIR) tablet 10 mg  10 mg Oral Nightly Moi Power MD   10 mg at 04/25/22 2047    metoprolol succinate (TOPROL XL) extended release tablet 50 mg  50 mg Oral BID Moi Power MD   50 mg at 04/26/22 1015    levothyroxine (SYNTHROID) tablet 50 mcg  50 mcg Oral Daily Moi Power MD   50 mcg at 04/26/22 6424    isosorbide dinitrate (ISORDIL) tablet 20 mg  20 mg Oral TID Moi Power MD   20 mg at 04/26/22 1305    hydrOXYzine (VISTARIL) capsule 50 mg  50 mg Oral TID PRN Moi Power MD        haloperidol (HALDOL) tablet 5 mg  5 mg Oral Daily Moi Power MD   5 mg at 04/26/22 1017    gabapentin (NEURONTIN) capsule 600 mg  600 mg Oral BID Moi Power MD   600 mg at 04/26/22 1014  [Held by provider] furosemide (LASIX) tablet 40 mg  40 mg Oral Daily Juancarlos Johnston MD   40 mg at 04/24/22 0815    doxepin (SINEQUAN) capsule 150 mg  150 mg Oral Nightly Juancarlos Johnston MD   150 mg at 04/25/22 2031    dicyclomine (BENTYL) capsule 10 mg  10 mg Oral Q6H PRN Juancarlos Johnston MD        busPIRone (BUSPAR) tablet 10 mg  10 mg Oral BID Juancarlos Johnston MD   10 mg at 04/26/22 1015    atorvastatin (LIPITOR) tablet 80 mg  80 mg Oral Nightly Juancarlos Johnston MD   80 mg at 04/25/22 2026    polyvinyl alcohol (LIQUIFILM TEARS) 1.4 % ophthalmic solution 1 drop  1 drop Both Eyes BID Juancarlos Johnston MD   1 drop at 04/26/22 1018    ranolazine (RANEXA) extended release tablet 1,000 mg  1,000 mg Oral BID Juancarlos Johnston MD   1,000 mg at 04/26/22 1014    meclizine (ANTIVERT) tablet 25 mg  25 mg Oral BID Juancarlos Johnston MD   25 mg at 04/26/22 1014    insulin lispro (HUMALOG) injection vial 0-6 Units  0-6 Units SubCUTAneous TID WC Juancarlos Johnston MD        insulin lispro (HUMALOG) injection vial 0-3 Units  0-3 Units SubCUTAneous Nightly Juancarlos Johnston MD   1 Units at 04/23/22 2147    glucose (GLUTOSE) 40 % oral gel 15 g  15 g Oral PRN Juancarlos Johnston MD        glucagon (rDNA) injection 1 mg  1 mg IntraMUSCular PRN Juancarlos Johnston MD        dextrose 5 % solution  100 mL/hr IntraVENous PRN Juancarlos Johnston MD        [Held by provider] hydrALAZINE (APRESOLINE) tablet 50 mg  50 mg Oral 3 times per day Juancarlos Johnston MD   50 mg at 04/24/22 2107    insulin glargine (LANTUS) injection vial 40 Units  40 Units SubCUTAneous Nightly Juancarlos Johnston MD   40 Units at 04/25/22 2152     Ambien [zolpidem tartrate], Capoten [captopril], Clioquinol, Cogentin [benztropine], Depakote [divalproex sodium], Effexor xr [venlafaxine hcl er], Geodon [ziprasidone hcl], Lisinopril, Lyrica [pregabalin], Navane [thiothixene], Pamelor [nortriptyline hcl], Remeron [mirtazapine], Risperdal [risperidone], Trazodone and nefazodone, and Wellbutrin [bupropion]  All reviewed and verified by Dr Tres Dyson on today's visit    No results found for: PSA PSADIA  [unfilled]    Physical Exam  Vitals:    04/25/22 1735 04/25/22 1743 04/25/22 1923 04/26/22 0744   BP: (!) 144/64 (!) 144/64 119/64 111/62   Pulse: 66 67 63 72   Resp:  18  18   Temp:  97 °F (36.1 °C) 97.2 °F (36.2 °C) 97.5 °F (36.4 °C)   TempSrc:    Oral   SpO2: 100% 100% 96% 100%   Weight:       Height:         Constitutional: Patient is somnolent sleeping  Physical exam otherwise unremarkable  Colon catheter draining clear urine. Assessment  Acute urinary retention probable immobilization, narcotics, and general anesthetic for recent abdominal surgery exacerbated by patient being on Ditropan XL  Plan  Discontinue Ditropan XL  Tamsulosin 0.4 mg p.o. start tonight orders written  Remove Colon in the morning for trial of void  Greater 50% of 50 minutes spent evaluating patient face to face. Poonam Macias MD FACS    Please note this report has been partially produced using speech recognition software  And may cause contain errors related to that system including grammar, punctuation and spelling as well as words and phrases that may seem inappropriate. If there are questions or concerns please feel free to contact me to clarify.

## 2022-04-26 NOTE — CARE COORDINATION
Met with patient and spouse at bedside. Discussed d/c plan. D/C plan remains to go home with [facility]. CM/SW to continue to follow for d/c planning needs.

## 2022-04-26 NOTE — CONSULTS
[facility]  Consult Note           Reason for Consult: RADHA  Requesting Physiciac:  Dr. Chrissy Torres    Chief Complaint:  Abdominal pain  History Obtained From:  patient, electronic medical record    History of Present Ilness:    61 y.o. female with history s/f T2DM, HTN, HLD, PAD, hypothyroidism, asthma who presented w/ RUQ/epigastric pain for ~1 week. Pt is being managed for acute cholecystitis. Pt is now s/p laparoscopic cholecystectomy and intraoperative cholangiogram (). Scr 1.77 on presentation, improved but now worsening since yesterday up to 2.7. Baseline Scr ~1.1-1.2 w/ eGFR high 40s/low 50s. K trending up. Hypotensive yesterday. BP better. No contrast use prior to this. Pt getting lokelma. Pt did have urinary retention s/p park catheter placement     Past Medical History:        Diagnosis Date    Asthma     CAD (coronary artery disease)     CKD (chronic kidney disease) stage 3, GFR 30-59 ml/min ([facility])     Colitis     Diabetes mellitus ([address]     Hyperlipidemia     Hypertension     PAD (peripheral artery disease) ([facility])     Prolonged emergence from general anesthesia     PVD (peripheral vascular disease) ([address]     Thyroid disease        Past Surgical History:        Procedure Laterality Date    CARDIAC SURGERY      CATARACT REMOVAL WITH IMPLANT Bilateral 11- 11-     SECTION      COLONOSCOPY N/A 2019    COLONOSCOPY DIAGNOSTIC performed by Alexsandra Lipscomb MD at [address] [place] [address] GRAFT  2019    unknown vessels    HYSTERECTOMY      TOE AMPUTATION Right     3rd toe       Home Medications:    No current facility-administered medications on file prior to encounter.      Current Outpatient Medications on File Prior to Encounter   Medication Sig Dispense Refill    dicyclomine (BENTYL) 10 MG capsule Take 1 capsule by mouth every 6 hours as needed (cramps) 20 capsule 0    ondansetron (ZOFRAN ODT) 4 MG disintegrating tablet Take 1 tablet by mouth every 8 hours as needed for Nausea 20 tablet 0    albuterol sulfate HFA (VENTOLIN HFA) 108 (90 Base) MCG/ACT inhaler Inhale 2 puffs into the lungs as needed for Wheezing Every 4-6 hours PRN 1 each 3    albuterol (PROVENTIL) (2.5 MG/3ML) 0.083% nebulizer solution Take 3 mLs by nebulization every 6 hours as needed for Wheezing 120 each 3    montelukast (SINGULAIR) 10 MG tablet Take 1 tablet by mouth nightly 30 tablet 3    Continuous Blood Gluc Sensor (FREESTYLE FELY 14 DAY SENSOR) MISC Every 2 weeks 2 each 06    insulin glargine (LANTUS SOLOSTAR) 100 UNIT/ML injection pen 60 units at bedtime 15 pen 3    insulin lispro, 1 Unit Dial, (HUMALOG KWIKPEN) 100 UNIT/ML SOPN 15  units at each meals 10 pen 03    Dulaglutide (TRULICITY) 1.17 IU/4.7YN SOPN Inject 0.75 mg into the skin once a week 4 pen 3    levothyroxine (SYNTHROID) 50 MCG tablet take 1 tablet by mouth once daily 30 tablet 5    oxybutynin (DITROPAN-XL) 5 MG extended release tablet take 1 tablet by mouth once daily 90 tablet 3    atorvastatin (LIPITOR) 40 MG tablet take 2 tablets by mouth daily      busPIRone (BUSPAR) 10 MG tablet       doxepin (SINEQUAN) 100 MG capsule       prazosin (MINIPRESS) 2 MG capsule       Alcohol Swabs (ALCOHOL PREP) 70 % PADS qid 300 each 06    blood glucose test strips (FREESTYLE LITE) strip 1 each by In Vitro route daily As needed. 100 each 3    Continuous Blood Gluc Sensor (FREESTYLE FELY 14 DAY SENSOR) MISC Every 2 weeks 2 each 06    Continuous Blood Gluc  (FREESTYLE FELY 14 DAY READER) CATHLEEN As directed 2 each 3    FreeStyle Lancets MISC 1 each by Does not apply route daily 100 each 3    Insulin Pen Needle ([employer] PEN NEEDLES 31G) 31G X 8 MM MISC 1 each by Does not apply route 4 times daily 300 each 3    oxyCODONE-acetaminophen (PERCOCET) 5-325 MG per tablet Take 1 tablet by mouth every 4 hours as needed for Pain.       Insulin Pen Needle (NOVOFINE) 32G X 6 MM MISC qid 300 each 3    ammonium lactate (LAC-HYDRIN) 12 % lotion       atorvastatin (LIPITOR) 80 MG tablet take 1 tablet by mouth at bedtime      diclofenac sodium (VOLTAREN) 1 % GEL apply 4 grams to affected area three times a day AS NEEDED FOR PAIN      doxepin (SINEQUAN) 25 MG capsule 75 mg nightly       ranolazine (RANEXA) 1000 MG extended release tablet Take 1 tablet by mouth 2 times daily 60 tablet 3    gabapentin (NEURONTIN) 600 MG tablet Take 600 mg by mouth 2 times daily.  RESTASIS 0.05 % ophthalmic emulsion       neomycin-polymyxin-dexameth (MAXITROL) 3.5-16653-9.1 ophthalmic suspension instill 1 drop into both eyes four times a day      ARTIFICIAL TEARS 1.4 % ophthalmic solution       docusate sodium (COLACE, DULCOLAX) 100 MG CAPS Take 100 mg by mouth 2 times daily (Patient taking differently: Take 200 mg by mouth daily At bedtime.) 60 capsule 1    sertraline (ZOLOFT) 100 MG tablet Take 200 mg by mouth daily       furosemide (LASIX) 40 MG tablet Take 1 tablet by mouth daily 60 tablet 3    ticagrelor (BRILINTA) 90 MG TABS tablet Take 90 mg by mouth 2 times daily      CPAP Machine MISC by Does not apply route New CPAP with 10 cm 1 each 0    aspirin 81 MG EC tablet Take 1 tablet by mouth daily 30 tablet 3    OXYGEN 2 lit O2 with sleep , please give O2 concentrator 1 Units 0    Emollient (EUCERIN INTENSIVE REPAIR HAND) 2.5-10 % CREA APPLY TO FEET AT BEDTIME; PLACE SOCKS OVER FEET AFTER APPLICATION IF NEEDED FOR DRY CRACKING FEET      hydrOXYzine (VISTARIL) 25 MG capsule Take 50 mg by mouth 3 times daily as needed       Nutritional Supplements (GLUCERNA SHAKE) LIQD take as directed three times a day      isosorbide dinitrate (ISORDIL) 20 MG tablet Take 1 tablet by mouth 3 times daily 90 tablet 3    nitroGLYCERIN (NITROSTAT) 0.4 MG SL tablet up to max of 3 total doses.  If no relief after 1 dose, call 911. 25 tablet 3    hydrALAZINE (APRESOLINE) 50 MG tablet Take 1 tablet by mouth every 8 hours 90 tablet 3    metoprolol succinate (TOPROL XL) 50 MG extended release tablet Take 1 tablet by mouth 2 times daily 30 tablet 3    haloperidol (HALDOL) 5 MG tablet Take 5 mg by mouth daily      folic acid (FOLVITE) 1 MG tablet Take 1 mg by mouth daily      Iron Polysacch Qlpca-R96-CX (NIFEREX-150 FORTE PO) Take 1 tablet by mouth daily       Cyanocobalamin (VITAMIN B-12) 1000 MCG extended release tablet Take 1,000 mcg by mouth daily      meclizine (ANTIVERT) 25 MG tablet Take 25 mg by mouth 2 times daily          Allergies:  Ambien [zolpidem tartrate], Capoten [captopril], Clioquinol, Cogentin [benztropine], Depakote [divalproex sodium], Effexor xr [venlafaxine hcl er], Geodon [ziprasidone hcl], Lisinopril, Lyrica [pregabalin], Navane [thiothixene], Pamelor [nortriptyline hcl], Remeron [mirtazapine], Risperdal [risperidone], Trazodone and nefazodone, and Wellbutrin [bupropion]    Social History:    Social History     Socioeconomic History    Marital status:      Spouse name: Not on file    Number of children: Not on file    Years of education: Not on file    Highest education level: Not on file   Occupational History    Not on file   Tobacco Use    Smoking status: Never Smoker    Smokeless tobacco: Never Used   Vaping Use    Vaping Use: Never used   Substance and Sexual Activity    Alcohol use: Never    Drug use: Never    Sexual activity: Not on file   Other Topics Concern    Not on file   Social History Narrative         Lives With: Spouse    Type of Home: [address] [place] [address]: One level    Home Access: Elevator    Bathroom Shower/Tub: Tub/Shower unit(Simultaneous filing.  User may not have seen previous data.)    Bathroom Equipment: Grab bars in shower, Shower chair    Home Equipment: Rolling walker, Joana 450 bed    ADL Assistance: Needs assistance    Homemaking Assistance: Needs assistance    Homemaking Responsibilities: No    Ambulation Assistance: Independent    Transfer Assistance: Independent    Active : No    Additional Comments: Pt wears orthopedic shoes at baseline    The patient states she is current with [facility](RN per the ). The patient had a walker, but obtained a hospital bed, wheel chair, BSC and O2 last admission (from [address]). pharmacy is [address] [place] [address],[address] [address]., [facility]. Transportation is provided by [employer]	[place] () per the patient     Social Determinants of Health     Financial Resource Strain:     Difficulty of Paying Living Expenses: Not on file   Food Insecurity:     Worried About [facility] in the Last Year: Not on file    Shayy of Food in the Last Year: Not on file   Transportation Needs:     Lack of Transportation (Medical): Not on file    Lack of Transportation (Non-Medical): Not on file   Physical Activity:     Days of Exercise per Week: Not on file    Minutes of Exercise per Session: Not on file   Stress:     Feeling of Stress : Not on file   Social Connections:     Frequency of Communication with Friends and Family: Not on file    Frequency of Social Gatherings with Friends and Family: Not on file    Attends Synagogue Services: Not on file    Active Member of [address] [place] [address] [employer] or Organizations: Not on file    Attends Club or Organization Meetings: Not on file    Marital Status: Not on file   Intimate Partner Violence:     Fear of Current or Ex-Partner: Not on file    Emotionally Abused: Not on file    Physically Abused: Not on file    Sexually Abused: Not on file   Housing Stability:     Unable to Pay for Housing in the Last Year: Not on file    Number of [place] in the Last Year: Not on file    Unstable Housing in the Last Year: Not on file       Family History:   History reviewed. No pertinent family history.     Review of Systems:   Positives in bold  Constitutional: fever, chills, fatigue, malaise   HENT:  rhinorrhea, sinus pain, sore throat, epistaxis    Eyes:  photophobia, visual disturbance, eye redness  Respiratory: shortness of breath, cough, hemoptysis    Cardiovascular: chest pain, palpitations, orthopnea, leg swelling   Gastrointestinal: abdominal pain, nausea, vomiting, diarrhea, constipation   Endocrine: polydipsia, polyphagia, cold intolerance, heat intolerance  Genitourinary: dysuria, flank pain, frequency, urgency   Hematologic: easy bruising, easy bleeding  Musculoskeletal: back pain, neck pain, myalgias, arthalgias  Neurological: syncope, lightheadedness, dizziness, seizures, tremors, weakness  Psychiatric/Behavioral: anxiety, depression, hallucinations  Skin: rash, itching    Physical exam:   Constitutional:  VITALS:  /62   Pulse 72   Temp 97.5 °F (36.4 °C) (Oral)   Resp 18   Ht 5' 4\" (1.626 m)   Wt 232 lb 3.2 oz (105.3 kg)   SpO2 100%   BMI 39.86 kg/m²     General: alert, in no apparent distress  HEENT: normocephalic, atraumatic, anicteric  Neck: supple, no mass  Lungs: non-labored respirations, clear to auscultation bilaterally  Heart: regular rate and rhythm, no murmurs or rubs  Abdomen: soft, TTP, non-distended  MSK: no joint swelling or tenderness  Ext: no cyanosis, no peripheral edema  Neuro: alert and oriented, no gross abnormalities  Psych: normal mood and affect    Data/  CBC:   Lab Results   Component Value Date    WBC 9.0 04/23/2022    RBC 4.69 04/23/2022    HGB 11.5 04/23/2022    HCT 35.0 04/23/2022    MCV 74.7 04/23/2022    MCH 24.5 04/23/2022    MCHC 32.8 04/23/2022    RDW 19.5 04/23/2022     04/23/2022     BMP:    Lab Results   Component Value Date     04/26/2022    K 5.6 04/26/2022     04/26/2022    CO2 19 04/26/2022    BUN 50 04/26/2022    LABALBU 3.4 04/26/2022    LABALBU <7.0 01/06/2020    CREATININE 2.76 04/26/2022    CALCIUM 8.9 04/26/2022    GFRAA 20.9 04/26/2022    LABGLOM 17.3 04/26/2022    GLUCOSE 106 04/26/2022    GLUCOSE 279 01/06/2020     CT ABDOMEN PELVIS WO CONTRAST Additional Contrast? None    Result Date: 4/23/2022  EXAM:  CT ABDOMEN PELVIS WO CONTRAST History: Abdominal pain Technique: Multiple contiguous axial images were obtained of the abdomen and pelvis from the level of the lung bases through the ischial tuberosities without contrast. Multiplanar reformats were obtained. Comparison: CT abdomen pelvis April 20, 2022 Findings: Lung bases are clear. Heart size is enlarged. Mitral annular calcification. Evidence of prior thoracic surgery. Lack of intravenous contrast precludes optimal evaluation of the abdominal and pelvic viscera. The unenhanced liver, spleen, stomach, pancreas, and adrenal glands appear within normal limits. The gallbladder is mildly distended but not significant changed from recent CT. There are debris is present within the gallbladder lumen. No gallbladder wall thickening or pericholecystic fluid identified by CT. Mild bilateral perinephric stranding. No urinary tract calculi or hydronephrosis. The urinary bladder is decompressed. The uterus is absent. Abdominal aorta is nonaneurysmal  . No retroperitoneal or abdominal/pelvic lymphadenopathy. No small bowel obstruction. No overt colonic mass or pericolonic inflammation. No findings of acute appendicitis No free fluid, loculated fluid collection, or pneumoperitoneum. No acute osseous abnormality. Mildly distended gallbladder containing gallbladder sludge and/or tiny gallstones without significant interval change since April 20, 2022 CT. No CT findings of acute cholecystitis. Mild bilateral perinephric stranding is nonspecific. Correlation for polynephritis is recommended. All CT scans at this facility use dose modulation, iterative reconstruction, and/or weight based dosing when appropriate to reduce radiation dose to as low as reasonably achievable.     CT ABDOMEN PELVIS W IV CONTRAST Additional Contrast? None    Result Date: 4/20/2022  EXAMINATION: CT ABDOMEN PELVIS W IV CONTRAST DATE AND TIME:4/20/2022 7:19 AM CLINICAL HISTORY: Acute abdominal pain. Epigastric pain. abd and chest pain x 2 days  COMPARISON: August 15, 2021 TECHNIQUE: Contiguous axial CT sections of the abdomen and pelvis. 100 cc's of IV contrast given. .  All CT scans at this facility use dose modulation, iterative reconstruction, and/or weight based dosing when appropriate to reduce radiation dose to as low as reasonably achievable. Some of this report was completed using  [id number] and may include unintended errors with respect to translation of words, typographical errors or grammatical errors which may not have been identified prior to the finalization of this report. FINDINGS: Incidental gallstones again noted without secondary signs of acute gallbladder disease. Hepatic steatosis. Spleen pancreas and kidneys are negative. No diverticulitis or colitis. No bowel obstruction. No aneurysm. Bones intact. IMPRESSION: NO ACUTE PATHOLOGY. XR CHEST PORTABLE    Result Date: 4/20/2022  EXAMINATION: XR CHEST PORTABLE CLINICAL HISTORY: CHEST AND ABDOMINAL PAIN FOR 2 DAYS COMPARISONS: CHEST RADIOGRAPH NOVEMBER 16, 2021, CT CHEST NOVEMBER 13, 2021 FINDINGS: Median sternotomy. Cardiopericardial silhouette upper limits normal, unchanged. Ill-defined areas increase opacity lower lung zones bilaterally. BILATERAL LOWER LUNG ATELECTASIS/PNEUMONIA    US ABDOMEN LIMITED    Result Date: 4/23/2022  EXAM: US ABDOMEN LIMITED HISTORY: Right upper quadrant pain TECHNIQUE: Ultrasound evaluation was performed of the right upper quadrant of the abdomen. COMPARISON: CT abdomen pelvis April 23, 2022 FINDINGS: Normal echogenicity and contour of the liver. No liver lesion or intrahepatic biliary dilatation. Main portal vein is patent. The gallbladder is mildly distended. Layering echogenic material is identified within the lumen of the gallbladder. No pericholecystic fluid.  Gallbladder wall thickness is at the upper limits of normal measuring 2.7 mm. Negative Velez sign reported. Common bile duct is normal in diameter measuring 4 mm. No overt abnormality of the pancreas. Nonspecific findings of the gallbladder including mildly distended gallbladder containing layering sludge and/or tiny gallstones with wall thickness of the upper limits of normal measuring 2.7 mm. No pericholecystic fluid to suggest acute cholecystitis. FL CHOLANGIOGRAM OR    Result Date: 4/25/2022  EXAMINATION: FL CHOLANGIOGRAM OR CLINICAL HISTORY:  pain COMPARISONS: None available. FINDINGS: Fluoroscopic assistance was provided during intraoperative cholangiogram. There is contrast opacification of the intrahepatic bile ducts, common hepatic duct, the distal cystic duct, and common bile duct. There is spillage of contrast into the duodenum. There are no persistent filling defects. Number of images: 183 The total radiation time is 12.3 seconds Radiation Dose is 2.71 rad. Fluoroscopic assistance was provided during intraoperative cholangiogram. Please refer to operative report. Assessment:  61 y.o. female with history s/f T2DM, HTN, HLD, PAD, hypothyroidism, asthma who presented w/ RUQ/epigastric pain for ~1 week. 1. RADHA on CKD stage III: most likely 2/2 hypotension +/- urinary retention , function already worsening prior to surgery, baseline Scr ~1.1-1.2 w/ eGFR high 40s/low 50s, got toradol on 4/22,   2. Hyperkalemia  3. Hyponatremia  4. Hypoalbuminemia  5. Acute cholecystitis  6. Acute urinary retention     Plan:  - agree w/ catheter placement and urology consult   - checking urinalysis  - ok w/ IVFs   - continue lokelma for now       Thank you for the consultation. Will continue to follow  Please do not hesitate to call with questions.     Tereza Young MD, MD

## 2022-04-26 NOTE — PROGRESS NOTES
Renal Adjustment Per Protocol: Lovenox 30 mg bid changed to lovenox 30 mg daily  Recent Labs     04/26/22  0901   CREATININE 2.76*   Estimated Creatinine Clearance: 24 mL/min (A) (based on SCr of 2.76 mg/dL (H)). Richie Manning

## 2022-04-26 NOTE — PROGRESS NOTES
Pt Name: Caye Dandy  Medical Record Number: [id number]  Date of Birth 1955   Admit date 4/22/2022  9:11 PM  Today's Date: 4/26/2022     ASSESSMENT  1. Hospital day # 3  2 postop day #1 laparoscopic cholecystectomy with cholangiogram    PLAN  1.  Keep LA drain  2. Pain control    SUBJECTIVE  Chief complaint: Follow-up cholecystectomy  Afebrile, vital signs are stable. She denies any nausea or vomiting, has not passed flatus or had a bowel movement. She is tolerating a ADULT DIET; Regular. Her pain is well controlled on current medications. has a past medical history of Asthma, CAD (coronary artery disease), CKD (chronic kidney disease) stage 3, GFR 30-59 ml/min ([facility]), Colitis, Diabetes mellitus ([facility] Hyperlipidemia, Hypertension, PAD (peripheral artery disease) ([facility] Prolonged emergence from general anesthesia, PVD (peripheral vascular disease) ([facility] and Thyroid disease.     CURRENT MEDS  Scheduled Meds:   dextrose bolus (hypoglycemia)  250 mL IntraVENous Once    sodium chloride flush  5-40 mL IntraVENous 2 times per day    enoxaparin  30 mg SubCUTAneous BID    sertraline  200 mg Oral Daily    prazosin  2 mg Oral Nightly    oxybutynin  5 mg Oral Nightly    montelukast  10 mg Oral Nightly    metoprolol succinate  50 mg Oral BID    levothyroxine  50 mcg Oral Daily    isosorbide dinitrate  20 mg Oral TID    haloperidol  5 mg Oral Daily    gabapentin  600 mg Oral BID    [Held by provider] furosemide  40 mg Oral Daily    doxepin  150 mg Oral Nightly    busPIRone  10 mg Oral BID    atorvastatin  80 mg Oral Nightly    polyvinyl alcohol  1 drop Both Eyes BID    ranolazine  1,000 mg Oral BID    meclizine  25 mg Oral BID    insulin lispro  0-6 Units SubCUTAneous TID WC    insulin lispro  0-3 Units SubCUTAneous Nightly    [Held by provider] hydrALAZINE  50 mg Oral 3 times per day    insulin glargine  40 Units SubCUTAneous Nightly     Continuous Infusions:   sodium chloride      sodium chloride      sodium chloride 100 mL/hr at 22 0646    dextrose       PRN Meds:.dextrose, sodium chloride, fentanNYL, oxyCODONE-acetaminophen **OR** oxyCODONE-acetaminophen, HYDROmorphone **OR** HYDROmorphone, sodium chloride flush, sodium chloride, ondansetron **OR** ondansetron, polyethylene glycol, acetaminophen **OR** acetaminophen, albuterol, albuterol sulfate HFA, hydrOXYzine, dicyclomine, glucose, glucagon (rDNA), dextrose    OBJECTIVE  CURRENT VITALS:  height is 5' 4\" (1.626 m) and weight is 232 lb 3.2 oz (105.3 kg). Her temperature is 97.2 °F (36.2 °C). Her blood pressure is 119/64 and her pulse is 63. Her respiration is 18 and oxygen saturation is 96%. Temperature Range (24h):Temp: 97.2 °F (36.2 °C) Temp  Av.3 °F (35.7 °C)  Min: 95.9 °F (35.5 °C)  Max: 98.2 °F (36.8 °C)  BP Range (30W): Systolic (01NWN), FJH:087 , Min:71 , FAH:960     Diastolic (55VHC), DTY:26, Min:42, Max:95    Pulse Range (24h): Pulse  Av.1  Min: 15  Max: 70  Respiration Range (24h): Resp  Av.1  Min: 0  Max: 26    GENERAL: alert, no distress  LUNGS: clear to ausculation, without wheezes, rales or rhonci  HEART: normal rate and regular rhythm  ABDOMEN: non-distended, LA serosanguineous, bowel sounds present in all 4 quadrants and no guarding or peritoneal signs  EXTERMITY: no cyanosis, clubbing or edema  In: 1200 [I.V.:1200]  Out: 670 [Urine:400; Drains:200]  Date 22 0000 - 22 2359   Shift 7657-0129 8476-4872 3068-4441 24 Hour Total   INTAKE   Shift Total(mL/kg)       OUTPUT   Urine(mL/kg/hr) 400   400   Shift Total(mL/kg) 400(3.8)   400(3.8)   Weight (kg) 105.3 105.3 105.3 105.3       LABS  Recent Labs     22  0706 22  0750    134*   K 4.2 5.4*    103   CO2 22 18*   BUN 29* 38*   CREATININE 1.36* 2.19*   CALCIUM 8.7 8.5      No results for input(s): PTT, INR in the last 72 hours.     Invalid input(s): PT  Recent Labs     22  0706 22  0750   AST 15 25   ALT 10 13   BILITOT 0.5 0.6       RADIOLOGY  CT ABDOMEN PELVIS WO CONTRAST Additional Contrast? None    Result Date: 4/23/2022  EXAM:  CT ABDOMEN PELVIS WO CONTRAST History: Abdominal pain Technique: Multiple contiguous axial images were obtained of the abdomen and pelvis from the level of the lung bases through the ischial tuberosities without contrast. Multiplanar reformats were obtained. Comparison: CT abdomen pelvis April 20, 2022 Findings: Lung bases are clear. Heart size is enlarged. Mitral annular calcification. Evidence of prior thoracic surgery. Lack of intravenous contrast precludes optimal evaluation of the abdominal and pelvic viscera. The unenhanced liver, spleen, stomach, pancreas, and adrenal glands appear within normal limits. The gallbladder is mildly distended but not significant changed from recent CT. There are debris is present within the gallbladder lumen. No gallbladder wall thickening or pericholecystic fluid identified by CT. Mild bilateral perinephric stranding. No urinary tract calculi or hydronephrosis. The urinary bladder is decompressed. The uterus is absent. Abdominal aorta is nonaneurysmal  . No retroperitoneal or abdominal/pelvic lymphadenopathy. No small bowel obstruction. No overt colonic mass or pericolonic inflammation. No findings of acute appendicitis No free fluid, loculated fluid collection, or pneumoperitoneum. No acute osseous abnormality. Mildly distended gallbladder containing gallbladder sludge and/or tiny gallstones without significant interval change since April 20, 2022 CT. No CT findings of acute cholecystitis. Mild bilateral perinephric stranding is nonspecific. Correlation for polynephritis is recommended. All CT scans at this facility use dose modulation, iterative reconstruction, and/or weight based dosing when appropriate to reduce radiation dose to as low as reasonably achievable.     CT ABDOMEN PELVIS W IV CONTRAST Additional Contrast? None    Result Date: 4/20/2022  EXAMINATION: CT ABDOMEN PELVIS W IV CONTRAST DATE AND TIME:4/20/2022 7:19 AM CLINICAL HISTORY: Acute abdominal pain. Epigastric pain. abd and chest pain x 2 days  COMPARISON: August 15, 2021 TECHNIQUE: Contiguous axial CT sections of the abdomen and pelvis. 100 cc's of IV contrast given. .  All CT scans at this facility use dose modulation, iterative reconstruction, and/or weight based dosing when appropriate to reduce radiation dose to as low as reasonably achievable. Some of this report was completed using  [id number] and may include unintended errors with respect to translation of words, typographical errors or grammatical errors which may not have been identified prior to the finalization of this report. FINDINGS: Incidental gallstones again noted without secondary signs of acute gallbladder disease. Hepatic steatosis. Spleen pancreas and kidneys are negative. No diverticulitis or colitis. No bowel obstruction. No aneurysm. Bones intact. IMPRESSION: NO ACUTE PATHOLOGY. XR CHEST PORTABLE    Result Date: 4/20/2022  EXAMINATION: XR CHEST PORTABLE CLINICAL HISTORY: CHEST AND ABDOMINAL PAIN FOR 2 DAYS COMPARISONS: CHEST RADIOGRAPH NOVEMBER 16, 2021, CT CHEST NOVEMBER 13, 2021 FINDINGS: Median sternotomy. Cardiopericardial silhouette upper limits normal, unchanged. Ill-defined areas increase opacity lower lung zones bilaterally. BILATERAL LOWER LUNG ATELECTASIS/PNEUMONIA    US ABDOMEN LIMITED    Result Date: 4/23/2022  EXAM: US ABDOMEN LIMITED HISTORY: Right upper quadrant pain TECHNIQUE: Ultrasound evaluation was performed of the right upper quadrant of the abdomen. COMPARISON: CT abdomen pelvis April 23, 2022 FINDINGS: Normal echogenicity and contour of the liver. No liver lesion or intrahepatic biliary dilatation. Main portal vein is patent. The gallbladder is mildly distended.  Layering echogenic material is identified within the lumen of the gallbladder. No pericholecystic fluid. Gallbladder wall thickness is at the upper limits of normal measuring 2.7 mm. Negative Velez sign reported. Common bile duct is normal in diameter measuring 4 mm. No overt abnormality of the pancreas. Nonspecific findings of the gallbladder including mildly distended gallbladder containing layering sludge and/or tiny gallstones with wall thickness of the upper limits of normal measuring 2.7 mm. No pericholecystic fluid to suggest acute cholecystitis. FL CHOLANGIOGRAM OR    Result Date: 4/25/2022  EXAMINATION: FL CHOLANGIOGRAM OR CLINICAL HISTORY:  pain COMPARISONS: None available. FINDINGS: Fluoroscopic assistance was provided during intraoperative cholangiogram. There is contrast opacification of the intrahepatic bile ducts, common hepatic duct, the distal cystic duct, and common bile duct. There is spillage of contrast into the duodenum. There are no persistent filling defects. Number of images: 183 The total radiation time is 12.3 seconds Radiation Dose is 2.71 rad. Fluoroscopic assistance was provided during intraoperative cholangiogram. Please refer to operative report.      Electronically signed by Florian Milton MD on 4/26/2022 at 7:38 AM

## 2022-04-26 NOTE — PROGRESS NOTES
Shift assessment complete. Patient A&Ox4. VSS. IPAD  services used. Daughter at bedside. Patient and family had questions regarding labs and urinary retention. She states she is still unable to urinate, but feels the urge to go. I attempted to assist her to the restroom, but she was too dizzy to stand. Bladder scan was done and showed > 215. Message sent to provider. Orders received. 1220: Order received for catheter insertion. Discussed with patient and her daughter, all questions answered. 16 FR park catheter placed using sterile technique. 200 ml's jong colored urine noted. Patient tolerated well. Will continue to monitor.

## 2022-04-26 NOTE — PROGRESS NOTES
Progress Note  Date:2022       [facility]  Patient Ralf Chavis     YOB: 1955     Age:66 y.o. Subjective    Subjective:  Symptoms:  She reports malaise. No shortness of breath, cough, chest pain, weakness, headache, chest pressure, anorexia, diarrhea or anxiety. Diet:  No nausea or vomiting. Review of Systems   Respiratory: Negative for cough and shortness of breath. Cardiovascular: Negative for chest pain. Gastrointestinal: Negative for anorexia, diarrhea, nausea and vomiting. Neurological: Negative for weakness. Objective         Vitals Last 24 Hours:  TEMPERATURE:  Temp  Av.3 °F (35.7 °C)  Min: 95.9 °F (35.5 °C)  Max: 97.4 °F (36.3 °C)  RESPIRATIONS RANGE: Resp  Av  Min: 0  Max: 26  PULSE OXIMETRY RANGE: SpO2  Av.7 %  Min: 90 %  Max: 100 %  PULSE RANGE: Pulse  Av.5  Min: 15  Max: 67  BLOOD PRESSURE RANGE: Systolic (00BNY), AMC:457 , Min:71 , KTU:925   ; Diastolic (15EPR), QZD:63, Min:42, Max:95    I/O (24Hr): Intake/Output Summary (Last 24 hours) at 2022 1139  Last data filed at 2022 0444  Gross per 24 hour   Intake 1200 ml   Output 670 ml   Net 530 ml     Objective:  General Appearance:  Comfortable, well-appearing and in no acute distress. Vital signs: (most recent): Blood pressure 119/64, pulse 63, temperature 97.2 °F (36.2 °C), resp. rate 18, height 5' 4\" (1.626 m), weight 232 lb 3.2 oz (105.3 kg), SpO2 96 %, not currently breastfeeding. HEENT: Normal HEENT exam.    Heart: S1 normal and S2 normal.    Abdomen: Abdomen is soft. There is right upper quadrant tenderness. Pulses: Distal pulses are intact. Neurological: Patient is alert. Pupils:  Pupils are equal, round, and reactive to light. Skin:  Warm and dry. Labs/Imaging/Diagnostics    Labs:  CBC:  No results for input(s): WBC, RBC, HGB, HCT, MCV, RDW, PLT in the last 72 hours.   CHEMISTRIES:  Recent Labs     22  0706 04/25/22  0750 04/26/22  0901    134* 134*   K 4.2 5.4* 5.6*    103 104   CO2 22 18* 19*   BUN 29* 38* 50*   CREATININE 1.36* 2.19* 2.76*   GLUCOSE 113* 70 106*     PT/INR:No results for input(s): PROTIME, INR in the last 72 hours. APTT:No results for input(s): APTT in the last 72 hours. LIVER PROFILE:  Recent Labs     04/24/22  0706 04/25/22  0750 04/26/22  0901   AST 15 25 57*   ALT 10 13 24   BILITOT 0.5 0.6 0.4   ALKPHOS 81 95 88       Imaging Last 24 Hours:  CT ABDOMEN PELVIS WO CONTRAST Additional Contrast? None    Result Date: 4/23/2022  EXAM:  CT ABDOMEN PELVIS WO CONTRAST History: Abdominal pain Technique: Multiple contiguous axial images were obtained of the abdomen and pelvis from the level of the lung bases through the ischial tuberosities without contrast. Multiplanar reformats were obtained. Comparison: CT abdomen pelvis April 20, 2022 Findings: Lung bases are clear. Heart size is enlarged. Mitral annular calcification. Evidence of prior thoracic surgery. Lack of intravenous contrast precludes optimal evaluation of the abdominal and pelvic viscera. The unenhanced liver, spleen, stomach, pancreas, and adrenal glands appear within normal limits. The gallbladder is mildly distended but not significant changed from recent CT. There are debris is present within the gallbladder lumen. No gallbladder wall thickening or pericholecystic fluid identified by CT. Mild bilateral perinephric stranding. No urinary tract calculi or hydronephrosis. The urinary bladder is decompressed. The uterus is absent. Abdominal aorta is nonaneurysmal  . No retroperitoneal or abdominal/pelvic lymphadenopathy. No small bowel obstruction. No overt colonic mass or pericolonic inflammation. No findings of acute appendicitis No free fluid, loculated fluid collection, or pneumoperitoneum. No acute osseous abnormality.      Mildly distended gallbladder containing gallbladder sludge and/or tiny gallstones without significant interval change since April 20, 2022 CT. No CT findings of acute cholecystitis. Mild bilateral perinephric stranding is nonspecific. Correlation for polynephritis is recommended. All CT scans at this facility use dose modulation, iterative reconstruction, and/or weight based dosing when appropriate to reduce radiation dose to as low as reasonably achievable. US ABDOMEN LIMITED    Result Date: 4/23/2022  EXAM: US ABDOMEN LIMITED HISTORY: Right upper quadrant pain TECHNIQUE: Ultrasound evaluation was performed of the right upper quadrant of the abdomen. COMPARISON: CT abdomen pelvis April 23, 2022 FINDINGS: Normal echogenicity and contour of the liver. No liver lesion or intrahepatic biliary dilatation. Main portal vein is patent. The gallbladder is mildly distended. Layering echogenic material is identified within the lumen of the gallbladder. No pericholecystic fluid. Gallbladder wall thickness is at the upper limits of normal measuring 2.7 mm. Negative Velez sign reported. Common bile duct is normal in diameter measuring 4 mm. No overt abnormality of the pancreas. Nonspecific findings of the gallbladder including mildly distended gallbladder containing layering sludge and/or tiny gallstones with wall thickness of the upper limits of normal measuring 2.7 mm. No pericholecystic fluid to suggest acute cholecystitis. Assessment//Plan           Hospital Problems           Last Modified POA    * (Principal) Abdominal pain 4/23/2022 Yes    Abdominal pain, right upper quadrant 4/25/2022 Yes      acute heather  CKD 3  Diabetes  CAD  RADHA on CKD  hyperkalemia  Assessment & Plan    4/24: Tegretol and aspirin for now as per surgery recommendation. For surgery for tomorrow. Sugars better controlled. She has right upper quadrant pain. Possible cholecystectomy tomorrow. 4/25: Patient is going for lap heather today. Anticipate discharge tomorrow. No overnight events no new complaints.   Continue current care.hold nephrotoxic agents, cw IVF.  4/26: Acute urinary retention s/p Colon, urology evaluation, renal function is worsening we will get nephrology evaluation, treat hyperkalemia. Spoke with nursing.    Electronically signed by Baldev Gilmore MD on 4/24/22 at 11:41 AM EDT

## 2022-04-27 NOTE — PROGRESS NOTES
Comprehensive Nutrition Assessment    Type and Reason for Visit:  Initial,Consult (TF order and manage)    Nutrition Recommendations/Plan:   1. Continue peptide based High Protein TF @ 20 ml/hr x 24 hrs    2. 50 ml water flush every 4 hrs    3. Monitor ability to modify TF goal rate to meet estimated nutrition needs     Malnutrition Assessment:  Malnutrition Status:  Insufficient data (04/27/22 6207)    Context:  Acute Illness     Findings of the 6 clinical characteristics of malnutrition:  Energy Intake:  Unable to assess  Weight Loss:  Unable to assess     Body Fat Loss:  Unable to assess     Muscle Mass Loss:  Unable to assess    Fluid Accumulation:  Unable to assess     Strength:  Not Performed    Nutrition Assessment:    Unable to determine nutrition status pta, pt currently intubated. Pt at nutrition risk due to inability to take po, trophic TF started. Will monitor ability to modify TF goal rate to meet estimated energy/protein needs    Nutrition Related Findings:    presents with acute encephalopathy. Patient was admitted on April 22, with abdominal pain, she was noted to have acute cholecystitis, she underwent laparoscopic cholecystectomy on April 25, she has been doing good postop, until yesterday evening, she became more encephalopathic, intubated 4/26.   OGT in place, propofol @ 15.8 ml/hr (417 kcal), IVF @ 100 ml/hr, meds include synthroid, sennacot, labs (4/27): Na+ 133 , elevated BUN/CR, gluc: 231, PMH: CKD, DM, CAD, thyroid disease Wound Type: None       Current Nutrition Intake & Therapies:    Average Meal Intake: NPO  Average Supplements Intake: NPO  ADULT TUBE FEEDING; Orogastric; Peptide Based High Protein; Continuous; 20; No; 50; Q 4 hours  Current Tube Feeding (TF) Orders:  · Feeding Route: Orogastric  · Formula: Peptide Based High Protein  · Schedule: Continuous  · Feeding Regimen: 20 ml/hr x 24 hrs (480 mls)  · Additives/Modulars: None  · Water Flushes: 50 ml every 4 hrs (300 mls)  · Current TF & Flush Orders Provides: 480 kcals, 42 gm protein, ~ 700 ml free water  · Goal TF & Flush Orders Provides: 480 kcals, 42 gm protein ~ 700 ml free water    Anthropometric Measures:  Height: 5' 4\" (162.6 cm)  Ideal Body Weight (IBW): 120 lbs (55 kg)    Admission Body Weight: 225 lb (102.1 kg) (stated)  Current Body Weight: 232 lb (105.2 kg), 193.3 % IBW. Weight Source: Bed Scale  Current BMI (kg/m2): 39.8  Usual Body Weight: 241 lb (109.3 kg) (8/15/21-office visit)  % Weight Change (Calculated): -3.7  BMI Categories: Obese Class 2 (BMI 35.0 -39.9)    Estimated Daily Nutrient Needs:  Energy Requirements Based On: Kcal/kg  Weight Used for Energy Requirements: Current  Energy (kcal/day): 8574-9669 (kg x 11-14)  Weight Used for Protein Requirements: Ideal  Protein (g/day): 109 gm  Method Used for Fluid Requirements: 1 ml/kcal  Fluid (ml/day): ~1200 ml or as per MD    Nutrition Diagnosis:   · Inadequate oral intake related to impaired respiratory function as evidenced by intubation    Nutrition Interventions:   Food and/or Nutrient Delivery: Continue Current Tube Feeding (Continue peptide based High Protein TF @ 20 ml/hr x 24 hrs  50 ml water flush every 4 hrs  Monitor ability to modify TF goal rate to meet estimated nutrition needs)     Goals:  Goals:  Tolerate nutrition support at goal rate    Nutrition Monitoring and Evaluation:   Food/Nutrient Intake Outcomes: Enteral Nutrition Intake/Tolerance  Physical Signs/Symptoms Outcomes: Biochemical Data,Fluid Status or Edema,Hemodynamic Status,Weight    Milka Preciado RD, LD

## 2022-04-27 NOTE — PLAN OF CARE
Problem: Pain  Goal: Verbalizes/displays adequate comfort level or baseline comfort level  Outcome: Progressing     Problem: ABCDS Injury Assessment  Goal: Absence of physical injury  Outcome: Progressing     Problem: Safety - Adult  Goal: Free from fall injury  Outcome: Progressing

## 2022-04-27 NOTE — PROGRESS NOTES
Nurse received report this morning,  at bedside. Dr. Camelia Horn up on floor at bedside. Vitals taken. Pt stuporous and difficult to arouse. BP 86F systolic. Pt placed in trendelenburg. Vitals set to BP q3min. HR sustaining in the high 40s low 50s. 96% on 2L. Pt overall very edematous. Respiratory called for abgs and NS 500cc bolus started. Son also now at bedside. Plan to transfer to ICU. Nurse manager notified. ABGs being drawn now. Pulses are weak all over. Nurse manager attempting to place second IV.

## 2022-04-27 NOTE — FLOWSHEET NOTE
This nurse called the previous nurse on [place] [id number], [address] [facility], the belongings that were charted in the belongings charting were shirt, pants and bracelet. Josh MARSH states that the patients  took them home.

## 2022-04-27 NOTE — PROGRESS NOTES
Occupational Therapy  Facility/Department: [facility]  Interdisciplinary Communication Note      To the referring provider,     While we thank you for your referral, Cuong Curry was not seen for an evaluation as:     [] This patient is of observation status and requires a diagnosis supportive of OT intervention in order to proceed. Please re-order at your convenience if OT is warranted. [] The patient refused skilled OT intervention, denying a need. [x] The patient has had a change in status and/or is not medically stable to participate.     [] The patient was observed as IND in the room and does not require skilled services. [] The patient was observed as DEP; skilled services would not improve the patient's functional status. Thank you,    Electronically signed by REAGAN Levy on 2/91/64 at 8:35 AM EDT    [facility] EMERGENCY [facility] AT [facility] Department.

## 2022-04-27 NOTE — PROGRESS NOTES
Nephrology Progress Note    Assessment:  61 y.o. female with history s/f T2DM, HTN, HLD, PAD, hypothyroidism, asthma who presented w/ RUQ/epigastric pain for ~1 week.     1. RADHA on CKD stage III: most likely 2/2 hypotension +/- urinary retention , function already worsening prior to surgery, baseline Scr ~1.1-1.2 w/ eGFR high 40s/low 50s, got toradol on 4/22,   2. Hyperkalemia  3. Hyponatremia  4. Hypoalbuminemia  5. Acute cholecystitis  6. Acute urinary retention: urology onboard   7. Encephalopathy: now intubated (4/27)  8.  Shock: on pressors and inotrope     Plan:  - monitor function, at risk of worsening due to shock  - f/u echo results  - diurese PRN        Patient Active Problem List:     Major depressive disorder, recurrent, severe with psychotic symptoms ([address]     Diabetes mellitus ([address]     Hypertension     HLD (hyperlipidemia)     Thyroid disease     Congestive heart failure (HCC)     Non-pressure ulcer of toe (HCC)     Atherosclerotic PVD with intermittent claudication (HCC)     Osteomyelitis (HCC)     Infection of skin     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     Surgical wound dehiscence, initial encounter     S/P amputation of lesser toe, right (HCC)     Rectal bleeding     Athscl native arteries of left leg w ulceration oth prt foot (HCC)     JESICA (obstructive sleep apnea)     Secondary diabetes mellitus (HCC)     Chest pain     Peripheral vascular disease (HCC)     Cellulitis     Syncope and collapse     Nonrheumatic mitral (valve) insufficiency     Ischemic myocardial dysfunction     Pulmonary hypertension (HCC)     Acute on chronic combined systolic and diastolic congestive heart failure (HCC)     Asthma     RADHA (acute kidney injury) ([address]     Dizziness     Acute cerebrovascular accident ([address]     Abnormal gait     Anxiety     Gastritis, unspecified, without bleeding     Pure hypercholesterolemia     Schizophrenia ([address]     Coronary arteriosclerosis     Extrapyramidal disease     Gangrene of toe ([facility]     Drug-induced hypotension     Osteomyelitis of right foot ([facility])     Nausea and vomiting     Mild intermittent asthma without complication     Heart failure, diastolic, with acute decompensation ([facility])     Major depressive disorder, single episode, unspecified     Type 2 diabetes mellitus with diabetic neuropathy, unspecified ([facility])     Obesity (BMI 30-39. 9)     Disorder of carotid artery ([facility])     NSTEMI (non-ST elevated myocardial infarction) ([facility]     Pneumonia     CKD (chronic kidney disease) stage 3, GFR 30-59 ml/min ([facility])     Pain in right hand     Anesthesia of skin     Carpal tunnel syndrome     History of angioplasty of peripheral vessel     History of coronary artery bypass surgery     Paresthesia of skin     Acute decompensated heart failure (HCC)     Depression     Abdominal pain     Abdominal pain, right upper quadrant      Subjective:  Admit Date: 4/22/2022    Interval History: very lethargic overnight, pt now intubated, in the ICU, on pressors     Medications:  Scheduled Meds:   prazosin  1 mg Oral Nightly    fentanNYL  50 mcg IntraVENous Once    rocuronium  1 mg/kg IntraVENous Once    propofol        fentaNYL        etomidate        rocuronium        pantoprazole (PROTONIX) 40 mg injection  40 mg IntraVENous Daily    chlorhexidine  15 mL Mouth/Throat BID    piperacillin-tazobactam  3,375 mg IntraVENous Q8H    sennosides-docusate sodium  2 tablet Oral BID    insulin lispro  0-6 Units SubCUTAneous 4 times per day    vancomycin  15 mg/kg (Adjusted) IntraVENous Once    vancomycin (VANCOCIN) intermittent dosing (placeholder)   Other RX Placeholder    sodium zirconium cyclosilicate  10 g Oral BID    enoxaparin  30 mg SubCUTAneous Daily    dextrose bolus (hypoglycemia)  250 mL IntraVENous Once    sodium chloride flush  5-40 mL IntraVENous 2 times per day    sertraline  200 mg Oral Daily    montelukast  10 mg Oral Nightly    [Held by provider] metoprolol succinate  50 mg Oral BID  levothyroxine  50 mcg Oral Daily    [Held by provider] isosorbide dinitrate  20 mg Oral TID    [Held by provider] haloperidol  5 mg Oral Daily    [Held by provider] furosemide  40 mg Oral Daily    [Held by provider] doxepin  150 mg Oral Nightly    [Held by provider] busPIRone  10 mg Oral BID    [Held by provider] atorvastatin  80 mg Oral Nightly    polyvinyl alcohol  1 drop Both Eyes BID    [Held by provider] ranolazine  1,000 mg Oral BID    meclizine  25 mg Oral BID    [Held by provider] hydrALAZINE  50 mg Oral 3 times per day    insulin glargine  40 Units SubCUTAneous Nightly     Continuous Infusions:   propofol 10 mcg/kg/min (04/27/22 1011)    fentaNYL 50 mcg/hr (04/27/22 1143)    DOPamine 20 mcg/kg/min (04/27/22 1001)    DOPamine      sodium chloride      sodium chloride      sodium chloride 100 mL/hr at 04/27/22 0243    dextrose         CBC:   Recent Labs     04/27/22  0600 04/27/22  0600 04/27/22  0803 04/27/22  1037   WBC 9.5  --   --   --    HGB 8.5*   < > 8.4* 9.0*     --   --   --     < > = values in this interval not displayed. CMP:    Recent Labs     04/25/22  0750 04/25/22  0750 04/26/22  0901 04/26/22  0901 04/27/22  0600 04/27/22  0803 04/27/22  1037   *  --  134*  --  133*  --   --    K 5.4*  --  5.6*  --  4.6  --   --      --  104  --  102  --   --    CO2 18*  --  19*  --  20  --   --    BUN 38*  --  50*  --  59*  --   --    CREATININE 2.19*   < > 2.76*   < > 2.99* 3.5* 3.6*   GLUCOSE 70  --  106*  --  183*  --   --    CALCIUM 8.5  --  8.9  --  7.6*  --   --    LABGLOM 22.6*   < > 17.3*   < > 15.7* 13* 13*    < > = values in this interval not displayed. Troponin:   Recent Labs     04/27/22  0600   TROPONINI 0.332*     BNP: No results for input(s): BNP in the last 72 hours. INR:   Recent Labs     04/27/22  1017   INR 1.3     Lipids: No results for input(s): CHOL, LDLDIRECT, TRIG, HDL, AMYLASE, LIPASE in the last 72 hours.   Liver:   Recent Labs 04/27/22  0600   AST 52*   ALT 17   ALKPHOS 84   PROT 6.0*   LABALBU 3.1*   BILITOT 0.5     Iron:  No results for input(s): IRONS, FERRITIN in the last 72 hours. Invalid input(s): LABIRONS  Urinalysis: No results for input(s): UA in the last 72 hours.     Objective:  Vitals: BP (!) 97/58   Pulse 61   Temp 97.9 °F (36.6 °C)   Resp 24   Ht 5' 4\" (1.626 m)   Wt 232 lb 3.2 oz (105.3 kg)   SpO2 96%   BMI 39.86 kg/m²    Wt Readings from Last 3 Encounters:   04/25/22 232 lb 3.2 oz (105.3 kg)   04/20/22 225 lb (102.1 kg)   04/11/22 226 lb (102.5 kg)      24HR INTAKE/OUTPUT:      Intake/Output Summary (Last 24 hours) at 4/27/2022 1304  Last data filed at 4/27/2022 1100  Gross per 24 hour   Intake --   Output 380 ml   Net -380 ml     General: intubated, sedated, morbidly obese   HEENT: normocephalic, atraumatic, ETT in place  Lungs: intubated, on vent, coarse breath sounds  Heart: regular rate and rhythm, no murmurs or rubs  Abdomen: soft, non-tender, non-distended  Ext: no cyanosis, ++ peripheral edema  Neuro: unable to assess, sedated      Electronically signed by Alberto Mckeon MD, MD

## 2022-04-27 NOTE — CONSULTS
This is an urgent consult for heart block. This patient sees Dr. Carlita Leal in outpatient basis. The patient underwent recently a laparoscopic/ cholecystectomy. The patient apparently was doing well yesterday, but was noted to be obtunded today. The patient's was EKG and rhythm strips noted to be a bundle branch block with complete heart block. This patient's heart rate responded to inotropic support. Presently she is in sinus mechanism, with a bundle branch block. She is presently intubated and heavily sedated. Her blood gas showed that she is acidotic with a pH of 7.2 PCO2 was in the 50s, PO2 was in the 60s. For that reason, we gave her 1 amp of bicarb and will repeat the blood gas. On exam, the patient is presently sedated, intubated, and has an OG. The patient recently had a left IJ catheter placed and presently a art line is being placed. There are plans to put a right IJ sheath for possible temporary pacemaker. Obviously, she is nonresponsive due to her intubation and sedation. She has regular rate and rhythm with a faint murmur. She has coarse breath sounds with fine rales. Abdomen is obese with evidence of a percutaneous drain. Her chest wall shows evidence of open heart surgery. The lower extremities show a trace of edema, and relatively cool to palpation    Review of her lab work shows that she has some baseline renal insufficiency. Creatinine is 1.33 on April 20. Her initial troponins were negative. Her as well as recent creatinine is now over 3, and she had mild elevation of her potassium. The intensivist team responded with the above-mentioned dopamine and other inotropic support, and apparently an amp of atropine was given. Her most recent blood gas shows a pH of 7.20. PCO2 is 52 and PO2 was 61. By blood gas, the most recent creatinine was 3.5. Some of her other lab work is pending.   Chest x-ray is pending, echocardiogram is pending, and repeat lab work is pending. The patient does have a significant past medical history. She had open heart surgery 2019. In addition, she had an echocardiogram which shows the following in        Summary   Compared to study of 2020, LVEF decreased, RVSP increased. Left ventricular ejection fraction is visually estimated at 35-40%. Severe anteroseptal hypokinesis   Papillary muscle hypertrophy   Diastolic dysfunction   Mildly enlarged right ventricle cavity. Right ventricle global systolic function is mildly reduced . Right ventricular systolic pressure of 62 mm Hg consistent with severe   pulmonary hypertension. Normal tricuspid valve structure and function. Moderate-to-severe tricuspid regurgitation. Normal pulmonic valve structure   2+ PI, No PS   Normal right atrium. Normal mitral valve structure   Mild MV leaflet thickening   2+ MR   Mildly dilated left atrium. Past medical history Per chart shows that she has history of asthma, chronic renal insufficiency, colitis, diabetes, hypertension, hyperlipidemia, PVD, thyroid disease, and above-mentioned laparoscopic cholecystectomy. She is also on multiple psych medications     Past surgical history is notable for above-mentioned open heart surgery, cataract removal, , colonoscopy, hysterectomy, and toe amputation    Family history is unable to be obtained at this time, and is not filled out on the preadmission testing    Substance use shows that patient is never been a smoker.   There is no evidence of alcohol or drug abuse    Patient is  and apparently lives at home    Other 12 point review systems is unable to be obtained due to the patient's intubation and sedation    The patient has 15 allergies-please see epic for full list    Assessment:  Patient is status post laparoscopic cholecystectomy  New onset acidosis  Worsening renal failure  Intermittent complete heart block  New bundle branch block  History of coronary artery disease  History of at least moderate LV dysfunction  History of mitral regurgitation    Plan:  1 amp of sodium bicarb has been given  Will repeat blood gas after that is circulated  Continue external pacemaker  Intensivist team to place right IJ in case temporary pacemaker needs to be floated  Twelve-lead EKG  Echocardiogram  Check troponins and CPK  Portable chest x-ray pending  Please see nurse practitioner note for further past medical history  Further orders per Dr. Andrea Page  Dr. Mendez All

## 2022-04-27 NOTE — PROGRESS NOTES
Progress Note  Date:2022       Room:[place] [address]  Patient Laina Stafford     YOB: 1955     Age:66 y.o.    ROS: Point review system cannot be obtained due to acuity of medical condition. Subjective      Objective         Vitals Last 24 Hours:  TEMPERATURE:  Temp  Av.8 °F (36.6 °C)  Min: 97.7 °F (36.5 °C)  Max: 97.9 °F (36.6 °C)  RESPIRATIONS RANGE: Resp  Av  Min: 24  Max: 24  PULSE OXIMETRY RANGE: SpO2  Av.7 %  Min: 96 %  Max: 100 %  PULSE RANGE: Pulse  Av  Min: 61  Max: 69  BLOOD PRESSURE RANGE: Systolic (29MFI), WZD:246 , Min:91 , IGT:433   ; Diastolic (56IUH), TFS:60, Min:41, Max:106    I/O (24Hr): Intake/Output Summary (Last 24 hours) at 2022 1056  Last data filed at 2022 0452  Gross per 24 hour   Intake 360 ml   Output 505 ml   Net -145 ml     Objective:  General Appearance:  Ill-appearing. Vital signs: (most recent): Blood pressure (!) 97/58, pulse 61, temperature 97.9 °F (36.6 °C), resp. rate 24, height 5' 4\" (1.626 m), weight 232 lb 3.2 oz (105.3 kg), SpO2 96 %, not currently breastfeeding. HEENT: Normal HEENT exam.    Heart: S1 normal and S2 normal.    Abdomen: Abdomen is soft. Bowel sounds are normal.   There is no abdominal tenderness. Pulses: Distal pulses are intact. Neurological: (Sedated, minimal responsive). Pupils:  Pupils are equal, round, and reactive to light. Skin:  Warm and dry.       Labs/Imaging/Diagnostics    Labs:  CBC:  Recent Labs     22  0600 22  0803 22  1037   WBC 9.5  --   --    RBC 3.47*  --   --    HGB 8.5* 8.4* 9.0*   HCT 25.9*  --   --    MCV 74.8*  --   --    RDW 19.1*  --   --      --   --      CHEMISTRIES:  Recent Labs     22  0750 22  0750 22  0901 22  0901 22  0600 22  0803 22  1037   *  --  134*  --  133*  --   --    K 5.4*  --  5.6*  --  4.6  --   --      --  104  --  102  --   --    CO2 18*  --  19*  --  20  -- --    BUN 38*  --  50*  --  59*  --   --    CREATININE 2.19*   < > 2.76*   < > 2.99* 3.5* 3.6*   GLUCOSE 70  --  106*  --  183*  --   --    MG  --   --   --   --  2.1  --   --     < > = values in this interval not displayed. PT/INR:No results for input(s): PROTIME, INR in the last 72 hours. APTT:No results for input(s): APTT in the last 72 hours. LIVER PROFILE:  Recent Labs     04/25/22  0750 04/26/22  0901 04/27/22  0600   AST 25 57* 52*   ALT 13 24 17   BILITOT 0.6 0.4 0.5   ALKPHOS 95 88 84       Imaging Last 24 Hours:  CT ABDOMEN PELVIS WO CONTRAST Additional Contrast? None    Result Date: 4/23/2022  EXAM:  CT ABDOMEN PELVIS WO CONTRAST History: Abdominal pain Technique: Multiple contiguous axial images were obtained of the abdomen and pelvis from the level of the lung bases through the ischial tuberosities without contrast. Multiplanar reformats were obtained. Comparison: CT abdomen pelvis April 20, 2022 Findings: Lung bases are clear. Heart size is enlarged. Mitral annular calcification. Evidence of prior thoracic surgery. Lack of intravenous contrast precludes optimal evaluation of the abdominal and pelvic viscera. The unenhanced liver, spleen, stomach, pancreas, and adrenal glands appear within normal limits. The gallbladder is mildly distended but not significant changed from recent CT. There are debris is present within the gallbladder lumen. No gallbladder wall thickening or pericholecystic fluid identified by CT. Mild bilateral perinephric stranding. No urinary tract calculi or hydronephrosis. The urinary bladder is decompressed. The uterus is absent. Abdominal aorta is nonaneurysmal  . No retroperitoneal or abdominal/pelvic lymphadenopathy. No small bowel obstruction. No overt colonic mass or pericolonic inflammation. No findings of acute appendicitis No free fluid, loculated fluid collection, or pneumoperitoneum. No acute osseous abnormality.      Mildly distended gallbladder containing gallbladder sludge and/or tiny gallstones without significant interval change since April 20, 2022 CT. No CT findings of acute cholecystitis. Mild bilateral perinephric stranding is nonspecific. Correlation for polynephritis is recommended. All CT scans at this facility use dose modulation, iterative reconstruction, and/or weight based dosing when appropriate to reduce radiation dose to as low as reasonably achievable. US ABDOMEN LIMITED    Result Date: 4/23/2022  EXAM: US ABDOMEN LIMITED HISTORY: Right upper quadrant pain TECHNIQUE: Ultrasound evaluation was performed of the right upper quadrant of the abdomen. COMPARISON: CT abdomen pelvis April 23, 2022 FINDINGS: Normal echogenicity and contour of the liver. No liver lesion or intrahepatic biliary dilatation. Main portal vein is patent. The gallbladder is mildly distended. Layering echogenic material is identified within the lumen of the gallbladder. No pericholecystic fluid. Gallbladder wall thickness is at the upper limits of normal measuring 2.7 mm. Negative Velez sign reported. Common bile duct is normal in diameter measuring 4 mm. No overt abnormality of the pancreas. Nonspecific findings of the gallbladder including mildly distended gallbladder containing layering sludge and/or tiny gallstones with wall thickness of the upper limits of normal measuring 2.7 mm. No pericholecystic fluid to suggest acute cholecystitis. Assessment//Plan           Hospital Problems           Last Modified POA    * (Principal) Abdominal pain 4/23/2022 Yes    Abdominal pain, right upper quadrant 4/25/2022 Yes      acute heather  CKD 3  Diabetes  CAD  RADAH on CKD  Hyperkalemia  shock  Assessment & Plan    4/24: Tegretol and aspirin for now as per surgery recommendation. For surgery for tomorrow. Sugars better controlled. She has right upper quadrant pain. Possible cholecystectomy tomorrow. 4/25: Patient is going for lap heather today. Anticipate discharge tomorrow.   No overnight events no new complaints. Continue current care.hold nephrotoxic agents, cw IVF.  4/26: Acute urinary retention s/p Colon, urology evaluation, renal function is worsening we will get nephrology evaluation, treat hyperkalemia. Spoke with nursing. 4/27: He upon entering room patient is lethargic, unable to arouse. Spoke with the  at bedside. Blood pressure is low, used  to speak with them. Transfer patient to ICU for hypotension/shock Patient was started on Flomax yesterday. We will get psych eval for polypharmacy. Patient mental status and condition is to be changed from yesterday.   Spoke with cardiac critical care team about the care plan  CC time was 45 min  Electronically signed by Angelika Agrawal MD on 4/24/22 at 11:41 AM EDT

## 2022-04-27 NOTE — PROGRESS NOTES
Spironolactone 25 mg would be more affordable.  Spironolactone 25 mg once daily sent to pharmacy per your request. Dyrenium canceled.  Pharmacy notified of instructions per Dr. Aguilar.    Urology  Events noted  Tamsulosin discontinued  Keep Colon in for now  Felicia Renae MD

## 2022-04-27 NOTE — CONSULTS
Pulmonary and Critical Care Medicine  Consult Note  Encounter Date: 2022 10:56 AM    Ms. Clary Allen is a 61 y.o. female  : 1955  Requesting Provider: Bebe Da Silva MD    Reason for request: Shock, ICU management            HISTORY OF PRESENT ILLNESS:    Patient is 61 y.o. presents with acute encephalopathy. Patient was admitted on , with abdominal pain, she was noted to have acute cholecystitis, she underwent laparoscopic cholecystectomy on , she has been doing good postop, until yesterday evening, she became more encephalopathic, she has history of sleep apnea and she was not using CPAP since admission. Per notes at home she is compliant with CPAP treatment. On arrival to ED, patient was bradycardic, heart rate in the 40s, hypotensive, mean arterial pressure in the 40s, she was given atropine 0.5, started on Levophed, fluid wide open, and started transcutaneous pacer blood pressure improved to mid 50s, she was intubated urgently, central line placed left IJ, cardiology consulted and I spoke with Dr. Luna Ayoub and Dr. Aydee Mccann with the plan to place percutaneous pacemaker sheath for potential need for temporary pacemaker later today. Patient started on dopamine and Levophed blood pressure improved to mid 60s, heart rate currently in the 70s.       Past Medical History:        Diagnosis Date    Asthma     CAD (coronary artery disease)     CKD (chronic kidney disease) stage 3, GFR 30-59 ml/min ([facility])     Colitis     Diabetes mellitus ([address]     Hyperlipidemia     Hypertension     PAD (peripheral artery disease) ([facility])     Prolonged emergence from general anesthesia     PVD (peripheral vascular disease) ([address]     Thyroid disease        Past Surgical History:        Procedure Laterality Date    CARDIAC SURGERY      CATARACT REMOVAL WITH IMPLANT Bilateral 11- 11-     SECTION      CHOLECYSTECTOMY, LAPAROSCOPIC N/A 2022    LAPAROSCOPIC CHOLECYSTECTOMY performed by Carmita Bashir MD at [address] [facility] [address] 8/20/2019    COLONOSCOPY DIAGNOSTIC performed by Makenzie Ellis MD at [address] [facility] GRAFT  06/2019    unknown vessels    HYSTERECTOMY      TOE AMPUTATION Right     3rd toe       Social History:     reports that she has never smoked. She has never used smokeless tobacco. She reports that she does not drink alcohol and does not use drugs. Family History:   History reviewed. No pertinent family history.   Not obtainable, patient on vent, unresponsive  Allergies:  Ambien [zolpidem tartrate], Capoten [captopril], Clioquinol, Cogentin [benztropine], Depakote [divalproex sodium], Effexor xr [venlafaxine hcl er], Geodon [ziprasidone hcl], Lisinopril, Lyrica [pregabalin], Navane [thiothixene], Pamelor [nortriptyline hcl], Remeron [mirtazapine], Risperdal [risperidone], Trazodone and nefazodone, and Wellbutrin [bupropion]        MEDICATIONS during current hospitalization:    Continuous Infusions:   propofol 10 mcg/kg/min (04/27/22 1011)    fentaNYL      DOPamine 20 mcg/kg/min (04/27/22 1001)    DOPamine      sodium chloride      sodium chloride      sodium chloride 100 mL/hr at 04/27/22 0243    dextrose         Scheduled Meds:   prazosin  1 mg Oral Nightly    fentanNYL  50 mcg IntraVENous Once    etomidate  40 mg/kg (Ideal) IntraVENous Once    rocuronium  1 mg/kg IntraVENous Once    propofol        fentaNYL        etomidate        rocuronium        pantoprazole (PROTONIX) 40 mg injection  40 mg IntraVENous Daily    chlorhexidine  15 mL Mouth/Throat BID    sodium zirconium cyclosilicate  10 g Oral BID    enoxaparin  30 mg SubCUTAneous Daily    [Held by provider] tamsulosin  0.4 mg Oral Dinner    dextrose bolus (hypoglycemia)  250 mL IntraVENous Once    sodium chloride flush  5-40 mL IntraVENous 2 times per day    sertraline  200 mg Oral Daily    montelukast  10 mg Oral Nightly    [Held by provider] metoprolol succinate  50 mg Oral BID    levothyroxine  50 mcg Oral Daily    [Held by provider] isosorbide dinitrate  20 mg Oral TID    [Held by provider] haloperidol  5 mg Oral Daily    [Held by provider] furosemide  40 mg Oral Daily    [Held by provider] doxepin  150 mg Oral Nightly    [Held by provider] busPIRone  10 mg Oral BID    [Held by provider] atorvastatin  80 mg Oral Nightly    polyvinyl alcohol  1 drop Both Eyes BID    [Held by provider] ranolazine  1,000 mg Oral BID    meclizine  25 mg Oral BID    insulin lispro  0-6 Units SubCUTAneous TID WC    insulin lispro  0-3 Units SubCUTAneous Nightly    [Held by provider] hydrALAZINE  50 mg Oral 3 times per day    insulin glargine  40 Units SubCUTAneous Nightly       PRN Meds:atropine, dextrose, sodium chloride, sodium chloride flush, sodium chloride, ondansetron **OR** ondansetron, polyethylene glycol, acetaminophen **OR** acetaminophen, albuterol, albuterol sulfate HFA, hydrOXYzine, dicyclomine, glucose, glucagon (rDNA), dextrose        REVIEW OF SYSTEMS: Not obtainable, patient unresponsive on vent      PHYSICAL EXAM:    Vitals:  BP (!) 97/58   Pulse 61   Temp 97.9 °F (36.6 °C)   Resp 24   Ht 5' 4\" (1.626 m)   Wt 232 lb 3.2 oz (105.3 kg)   SpO2 96%   BMI 39.86 kg/m²     General: Encephalopathic, unresponsive  Head: normocephalic, atraumatic  Eyes:No gross abnormalities. and PERRL  ENT:  MMM no lesions  Neck:  supple and no masses  Chest : Diminished air movement, minimal bilateral rales, no wheezing, nontender, tympanic  Heart[de-identified] Heart sounds are normal.  Regular rate and rhythm without murmur, gallop or rub. ABD: Soft, no apparent tenderness, no guarding or rebound, LA drain in with serosanguineous fluid  Musculoskeletal : no cyanosis, no clubbing and trace + edema-     Neuro:  Unresponsive  Skin: No rashes or nodules noted.   Lymph node:  no cervical nodes  Urology: No Colon   Psychiatric: unresponsive        Data Review  Recent Labs     04/27/22  0600 04/27/22  0803 04/27/22  1037   WBC 9.5  --   --    HGB 8.5* 8.4* 9.0*   HCT 25.9*  --   --      --   --       Recent Labs     04/25/22  0750 04/25/22  0750 04/26/22  0901 04/26/22  0901 04/27/22  0600 04/27/22  0803 04/27/22  1037   *  --  134*  --  133*  --   --    K 5.4*  --  5.6*  --  4.6  --   --      --  104  --  102  --   --    CO2 18*  --  19*  --  20  --   --    BUN 38*  --  50*  --  59*  --   --    CREATININE 2.19*   < > 2.76*   < > 2.99* 3.5* 3.6*   GLUCOSE 70  --  106*  --  183*  --   --     < > = values in this interval not displayed. MV Settings:     Vent Mode: AC/VC  Vt (Set, mL): 330 mL  Resp Rate (Set): 24 bmp  PEEP/CPAP (cmH2O): 5  Peak Inspiratory Pressure: 29 cmH2O  Mean Airway Pressure: 12 cmH20  I:E Ratio: 1:2.3    ABGs:   Recent Labs     04/27/22  0803 04/27/22  1037   PHART 7.206* 7.189*   POQ1JFH 52* 52*   PO2ART 61* 63*   NBG4YHG 20.7* 19.7*   BEART -7* -9*   B5XAJPRE 85* 85*   ZZG0APW 22 21     O2 Device: Ventilator  O2 Flow Rate (L/min): 2 L/min  Lab Results   Component Value Date    LACTA 1.0 04/27/2022    LACTA 1.4 04/22/2022    LACTA 1.4 04/20/2022       Radiology  I personally reviewed imaging studies and chest x-ray shows congestion, ET tube in good position, central line crossing midline toward superior vena cava, NG tube in good position        Assessment, plan:    This is a critically ill patient at risk of deterioration / death , needing close ICU monitoring and intervention due to below noted problems       · Shock, possibly cardiogenic, rule out obstructive etiology i.e. PE, and septic etiology  · Acute encephalopathy  · Acute hypoxic and hypercapnic respiratory failure, secondary to above  · Acute on chronic kidney injury  · JESICA, patient compliant with CPAP at home     Recommendation  · Vent support lung protective strategy  · head of the bed 30°  · Daily sedation holidays and breathing trials  · Sedation with combination propofol and fentanyl target R ASS of 0 to -1  · Watch for ICU delirium: TV on, natural light, avoid benzos, pain control, early mobility, and family engagement  · PUD prophylaxis  · DVT prophylaxis  · Levophed/dopamine target mean arterial pressure 65-70  · Monitor heart rate  · Appreciate cardiology, discussed with Dr. Buffy Warren at bedside and reviewed echo, right ventricle slightly more dilated than before with increased pressures, no new wall motion abnormality. · Panculture  · Start Zosyn and vancomycin  · Panculture  · Trophic feeds  · CT head  · Procalcitonin  · Therapeutic dose heparin drip for possible PE if okay with surgery, unable to do diagnostic or rule out study due to renal failure  · Bilateral lower extremity ultrasound  · Monitor ABG  Discussed with patient  and daughter using certified online translation service    Due to the immediate potential for life-threatening deterioration due to shock and acute respiratory failure, I spent 80 minutes providing critical care. This time is excluding time spent performing procedures. Thank you for consultation    Electronically signed by Fermin Marin MD, [facility],  on 4/27/2022 at 10:56 AM         PROCEDURE:   Endotracheal intubation. INDICATIONS:   Acute Respiratory Failure. Consent   The spouse was counseled regarding the procedure, it's indications, risks, potential complications and alternatives and any questions were answered. Consent was obtained. PROCEDURE SUMMARY:   Permit was implied secondary to emergent situation. Glide scope blade  was inserted into the oropharynx at which time the vocal cords were visualized. 7.5 Slovak endotracheal tube was inserted and visualized going through the vocal cords. The stylette was removed. Colorimetric change was visualized on the CO2 meter. Breath sounds were heard in both lung fields equally.  The endotracheal tube was placed at 23 cm, measured at the teeth.    COMPLICATIONS:    None    ESTIMATED BLOOD LOSS:   None      Keysha Hernández MD 11:11 AM 4/27/2022       Internal jugular central venous catheter; Ultrasound guided. [id number]                                                             [id number]    INDICATION:    Septic shock need for multiple pressors      CONSENT:  The spouse was counseled regarding the procedure, it's indications, risks, potential complications and alternatives and any questions were answered. Consent was obtained. PROCEDURE SUMMARY:   A time-out was performed. The patient's left neck region was prepped and draped in sterile fashion. The left internal jugular vein was accessed under ultrasound guidance using a finder needle and sheath. U/S images were permanently documented. Anesthesia was achieved with 1% lidocaine. The finder needle was inserted into the left neck under direct ultrasound guidance, and venous blood was withdrawn. The introducer needle was then inserted under direct ultrasound guidance and venous blood was withdrawn into the syringe. The syringe was removed and the guidewire was advanced through the introducer needle. A small incision was made with a scalpel and the introducer needle was removed. A dilator was advanced over the guidewire until appropriate dilation was obtained. The dilator was removed and an central venous  catheter was advanced over the guidewire and secured into place with 2 sutures at 20 cm, the line terminates in the superior vena cava central venous system. At time of procedure completion, all ports aspirated and flushed properly. Estimated Blood loss   less than 5 cc    COMPLICATIONS:  None      Right internal jugular 8 Surinamese sheath; Ultrasound guided.   [id number]                                                             [id number]    INDICATION:    Need for temporary pacemaker      CONSENT:  The spouse was counseled regarding the procedure, it's indications, risks, potential complications and alternatives and any questions were answered. Consent was obtained. PROCEDURE SUMMARY:   A time-out was performed. The patient's right neck region was prepped and draped in sterile fashion. The right internal jugular vein was accessed under ultrasound guidance using a finder needle and sheath. U/S images were permanently documented. Anesthesia was achieved with 1% lidocaine. The finder needle was inserted into the right neck under direct ultrasound guidance, and venous blood was withdrawn. The introducer needle was then inserted under direct ultrasound guidance and venous blood was withdrawn into the syringe. The syringe was removed and the guidewire was advanced through the introducer needle. A small incision was made with a scalpel and the introducer needle was removed. A dilator was advanced over the guidewire until appropriate dilation was obtained. The dilator was removed and an central venous  catheter was advanced over the guidewire and secured into place with 1 sutures,  the line terminates in the superior vena cava central venous system. At time of procedure completion, all ports aspirated and flushed properly. Estimated Blood loss   less than 5 cc    COMPLICATIONS:  None      PROCEDURE:   Radial artery line placement. (A-line)  [id number]    INDICATION:   Shock needs for frequent ABG and blood pressure monitoring    CONSENT: The spouse was counseled regarding the procedure, it's indications, risks, potential complications and alternatives and any questions were answered. Consent was obtained. nt. PROCEDURE SUMMARY:   Radial arteries were assessed by palpation and ultrasound localization. Delbra Eugene test was done to asses collateral circulation. The patient was prepped and draped in the usual sterile manner using chlorhexidine scrub. 1% lidocaine was used to numb the region. The left  radial artery was palpated and successfully cannulated on the first pass.  Pulsatile, arterial blood was visualized and the artery was then threaded using the Seldinger technique and a catheter was then sutured into place. Good wave-form was obtained. The patient tolerated the procedure well without any immediate complications. The area was cleaned and Tegaderm was applied.      ESTIMATED BLOOD LOSS:   Minimal    COMPLICATIONS:  No immediate complication     Julio Marshall MD

## 2022-04-27 NOTE — CARE COORDINATION
Pt sent to ICU this am and family here and updated. Pt being emergently cared for and requires ICU care and monitoring. We will continue to follow and assess needs when she is more stabilized. Pt on vent and multiple drips now.

## 2022-04-27 NOTE — PROGRESS NOTES
2330: Shift assessment complete, vss. Pt calm & cooperative.  services used during assessment & med administration. PM meds given as per [facility], pt c/o abd pain -- 9/10. Tylenol given as per [facility] -- pt denied Percocet. Upon assessment, pts LA drain was not compressed & had no drainage. During shift change, (1930), Simin RN emptied the drain and it was not compressed at that time. As per the PCA, (2100), when she went to reposition pt, the drain was not compressed. Tubing and bulb assessed -- Perfect Serve sent to Dr. Brad Barboza so that he is aware. No new orders. Pt's incision dressings were c/d/i. Old drainage/shadowing noted on dry dressing around LA drain. No swelling nor redness noted on abdomen. Bowel sounds x4 quadrants.  is at beside, cot was offered but he denied. No further complaints at this time, call light is within reach. Safety maintained. 1:  called & explained to PCA that pts tongue \"rolled into the back of her mouth\" (in [facility]). PCA alerted me, went in to assess. Pt very lethargic, pts  said she said that her tongue feels heavy. Pt rarely kept her eyes open throughout assessment. Vitals are as follows: Temp 97.9 F, HR 62, BP in right arm 91/41(58) -- upon recheck, BP was 97/58(71), SPO2 100% on RA. Blood pressures are lower than her baseline. I got an  and did a neuro check. Neuro check was WDL, pt is shaky. Pupils 3-4mm, round & brisk. Negative tongue deviation. Full smile, was able to make a kissy face. Arms could be raised equally, pt could grasp and pull my hands with hers. Pt could lift legs and perform dorsiflexion/extension equally.  believes this is all from anesthesia from 4/25 operation. Will pass information to next shift.     Electronically signed by Odessa Guido RN on 4/27/22 at 1:11 AM EDT

## 2022-04-27 NOTE — PROGRESS NOTES
Physical Therapy   Facility/Department: [facility] MED SURG IC07/IC07-01    Patient D/C'd from current physical therapy POC due to an acute change in this patient's medical status (transfered to ICU). New physical therapy Eval and Treat orders are required to resume PT when pt is stable and appropriate for out of bed activity. Sticky note written to physicians.     [address] [place] [address]) Physical Therapy Department    Electronically signed by Panda To PT on 4/27/22 at 8:29 AM EDT

## 2022-04-27 NOTE — CONSULTS
Cardiology Consult Note      Date:   4/27/2022  Patient name:  Lottie Belle  Date of admission:  4/22/2022  9:11 PM  MRN:   [id number]  YOB: 1955  Time of Consult:  9:38 AM    Consulting Cardiologist: Dr. Mynor Palomo APRN-CNP  Primary Cardiologist:  Dr. Emiliano Vigil    Referring Provider: Dr. Va Young MD    Please see my dictated consult on another note. The patient was personally interviewed and examined by myself. This note is to help complete her past medical history. All decision making process was made by Dr. Roseann Syed initially, then will be followed by Dr. Dianelys Callaway Reason:  Bradycardia    HPI:   Lottie Belle is a 61 y.o.  female patient who is being at the request of Dr. Va Young for inpatient consultation of bradycardia. She was admitted on 4/22/2022. Previous [facility] and [id number] [place] [address] records have been reviewed in detail. 61 yr old female with a PMH of complex cardiovascular and peripheral vascular disease, including cabg 6/2019, HTN, HLD, ishcemic cardiomyopathy that presented to [address] [facility] ER 4/20/2022 abdominal pain. Ultrasound showed acute cholecystitis, surgery was consulted, and she underwent a laparoscopic cholecystectomy with Dr. Tatiana Marie 4/25/2022. Per nursing notes she was noted to be stuporous and difficult to arouse this morning with systolic blood pressure in the 80s and noted to have heart rates in the 40s to 50s. She was 96% on 2 L she was administered 500 cc of normal saline IV bolus and transferred to ICU for further management for which we were consulted      CARDIAC HISTORY:  CAD: CABG 6/2019: L-LAD, PCI left circumflex and RCA.            4/6/2021 cardiac cath/NSTEMI: LVEF 50% apical hypokinesis. LVEDP 15. LM-10% patent stent. LAD-95% ostial 100% after SP2. Attempted PTCA ostial LAD unsuccessful. CX-stents patent. RCA: Occluded with retrograde collateral.  L-LAD patent.   9/15/2020 echocardiogram; LVEF 55 to 60%, normal RV size and function, 1-2+ MR, trivial TR RVSP 44 1+ SD  Pulmonary hypertension: Echo September 2020 LVEF 60%. 1+ MR.  RVSP 44 mmHg  Severe peripheral vascular disease with prior right third toe amputation and right lower extremity revascularization multiple attempts  6/2019:  · US Biolateral 50-69%         [facility] Medication List 3/16/2022  Medication Name Instruction   Albuterol Sulfate (2.5 MG/3ML) 0.083% Inhalation Nebulization Solution use 1 ampule via nebulizer every 4 hours as needed   Albuterol Sulfate  (90 Base) MCG/ACT Inhalation Aerosol Solution INHALE 1 PUFF EVERY 4 HOURS AS NEEDED. Artificial Tears 1.4 % Ophthalmic Solution INSTILL 1-2 DROPS INTO AFFECTED EYE(S)  4 TIMES DAILY AS DIRECTED. Aspirin 81 MG TABS TAKE 1 TABLET DAILY. Aspirin Low Dose 81 MG Oral Tablet Delayed Release take 1 tablet by mouth once daily   Atorvastatin Calcium 80 MG Oral Tablet TAKE 1 TABLET AT BEDTIME. Brilinta 90 MG Oral Tablet TAKE 1 TABLET TWICE DAILY. busPIRone HCl - 10 MG Oral Tablet TAKE ONE TABLET BY MOUTH TWICE DAILY   Diclofenac Sodium GEL APPLY SPARINGLY TO AFFECTED AREA(S) ONCE DAILY   Docusate Sodium 100 MG Oral Capsule TAKE 1 CAPSULE TWICE DAILY. Doxepin HCl - 100 MG Oral Capsule TAKE 1 CAPSULE AT BEDTIME. Eucerin Intensive Repair Hand 2.5-10 % External Cream APPLY SPARINGLY TO AFFECTED AREA(S) 3 TIMES A DAY   Folic Acid 1 MG Oral Tablet TAKE 1 TABLET DAILY. Furosemide 40 MG Oral Tablet TAKE 1 TABLET DAILY. Gabapentin 600 MG Oral Tablet TAKE 1 TABLET TWICE DAILY. Haloperidol 5 MG Oral Tablet TAKE 1 TABLET 3 times daily   hydrALAZINE HCl - 50 MG Oral Tablet TAKE 1 TABLET EVERY 8 HOURS DAILY. hydrOXYzine HCl - 25 MG Oral Tablet Take 1 tablet twice daily   Insulin Lispro 100 UNIT/ML SOPN 15 units at each meal   Lantus SoloStar 100 UNIT/ML SOLN TAKE 50 UNITS AT BEDTIME   Levothyroxine Sodium 50 MCG Oral Tablet TAKE 1 TABLET DAILY.    Meclizine HCl - 25 MG Oral Tablet TAKE 1 TABLET 3 TIMES DAILY AS NEEDED. Metoprolol Succinate ER 50 MG Oral Tablet Extended Release 24 Hour TAKE 1 TABLET TWICE  DAILY. Montelukast Sodium 10 MG Oral Tablet TAKE 1 TABLET AT BEDTIME. Neomycin-Polymyxin-HC 3.5-17505-7 Otic Suspension INSTILL 1 DROP INTO BOTH EYES FOUR TIMES DAILY   Nitroglycerin 0.4 MG Sublingual Tablet Sublingual PLACE 1 TABLET UNDER THE TONGUE EVERY 5 MINUTES FOR UP TO 3 DOSES AS NEEDED FOR CHEST PAIN. CALL 911 IF PAIN PERSISTS. oxyCODONE-Acetaminophen 5-325 MG Oral Tablet TAKE 1 TABLET EVERY 12 HOURS AS NEEDED FOR PAIN. Prazosin HCl - 2 MG Oral Capsule TAKE 1 CAPSULE TWICE DAILY. Ranolazine ER 1000 MG Oral Tablet Extended Release 12 Hour TAKE ONE TABLET BY MOUTH TWICE DAILY   Restasis 0.05 % Ophthalmic Emulsion INSTILL 1 DROP IN BOTH EYES EVERY 12 HOURS DAILY. Sertraline HCl - 100 MG Oral Tablet TAKE 2 TABLETS DAILY. Trulicity 3.15 GY/0.3KW Subcutaneous Solution Pen-injector inject 0.75 mg into skin once a week   Vitamin B-12 1000 MCG Oral Tablet TAKE 1 TABLET DAILY AS DIRECTED. Allergies:   Allergies   Allergen Reactions    Ambien [Zolpidem Tartrate]     Capoten [Captopril]     Clioquinol     Cogentin [Benztropine]     Depakote [Divalproex Sodium]     Effexor Xr [Venlafaxine Hcl Er]     Geodon [Ziprasidone Hcl]     Lisinopril      Hyperkalemia: 4/21/20 potassium was 6.7    Lyrica [Pregabalin]     Navane [Thiothixene]     Pamelor [Nortriptyline Hcl]     Remeron [Mirtazapine]     Risperdal [Risperidone]     Trazodone And Nefazodone     Wellbutrin [Bupropion]        Past Medical History:  Past Medical History:   Diagnosis Date    Asthma     CAD (coronary artery disease)     CKD (chronic kidney disease) stage 3, GFR 30-59 ml/min [facility]     Colitis     Diabetes mellitus [facility]     Hyperlipidemia     Hypertension     PAD (peripheral artery disease) ([facility])     Prolonged emergence from general anesthesia     PVD (peripheral vascular disease) ([facility]  Thyroid disease        Past Surgical History:  Past Surgical History:   Procedure Laterality Date    CARDIAC SURGERY      CATARACT REMOVAL WITH IMPLANT Bilateral 11- 11-     SECTION      CHOLECYSTECTOMY, LAPAROSCOPIC N/A 2022    LAPAROSCOPIC CHOLECYSTECTOMY performed by Tamie Arreola MD at [address] 2019    COLONOSCOPY DIAGNOSTIC performed by Ave Hatfield MD at [address] GRAFT  2019    unknown vessels    HYSTERECTOMY      TOE AMPUTATION Right     3rd toe       Family History:   History reviewed. No pertinent family history. Social  History:     Social History     Tobacco Use    Smoking status: Never Smoker    Smokeless tobacco: Never Used   Substance Use Topics    Alcohol use: Never       Home Medications:    Prior to Admission medications    Medication Sig Start Date End Date Taking?  Authorizing Provider   dicyclomine (BENTYL) 10 MG capsule Take 1 capsule by mouth every 6 hours as needed (cramps) 22   Hamzah Man PA-C   ondansetron (ZOFRAN ODT) 4 MG disintegrating tablet Take 1 tablet by mouth every 8 hours as needed for Nausea 22   Lillie Forte PA-C   albuterol sulfate HFA (VENTOLIN HFA) 108 (90 Base) MCG/ACT inhaler Inhale 2 puffs into the lungs as needed for Wheezing Every 4-6 hours PRN 22   Krunal Crandall MD   albuterol (PROVENTIL) (2.5 MG/3ML) 0.083% nebulizer solution Take 3 mLs by nebulization every 6 hours as needed for Wheezing 22  Krunal Crandall MD   montelukast (SINGULAIR) 10 MG tablet Take 1 tablet by mouth nightly 22   Krunal Crandall MD   Continuous Blood Gluc Sensor (FREESTYLE FELY 14 DAY SENSOR) MISC Every 2 weeks 3/30/22   Gumaro Hull MD   insulin glargine (LANTUS SOLOSTAR) 100 UNIT/ML injection pen 60 units at bedtime 3/30/22   Gumaro Hull MD   insulin lispro, 1 Unit Dial, (HUMALOG KWIKPEN) 100 UNIT/ML SOPN 15  units at each meals 3/30/22   Anabel Meneses MD   Dulaglutide (TRULICITY) 2.59 UX/9.1FV SOPN Inject 0.75 mg into the skin once a week 3/30/22   Anabel Meneses MD   levothyroxine (SYNTHROID) 50 MCG tablet take 1 tablet by mouth once daily 3/30/22   Anabel Meneses MD   oxybutynin (DITROPAN-XL) 5 MG extended release tablet take 1 tablet by mouth once daily 2/23/22   Philly Newsome MD   atorvastatin (LIPITOR) 40 MG tablet take 2 tablets by mouth daily 12/9/21   Historical Provider, MD   busPIRone (BUSPAR) 10 MG tablet  12/29/21   Historical Provider, MD   doxepin (SINEQUAN) 100 MG capsule  12/29/21   Historical Provider, MD   prazosin (MINIPRESS) 2 MG capsule  12/21/21   Historical Provider, MD   Alcohol Swabs (ALCOHOL PREP) 70 % PADS qid 12/30/21   Anabel Meneses MD   blood glucose test strips (FREESTYLE LITE) strip 1 each by In Vitro route daily As needed. 12/30/21   Anabel Meneses MD   Continuous Blood Gluc Sensor (FREESTYLE FELY 14 DAY SENSOR) MISC Every 2 weeks 12/30/21   Anabel Meneses MD   Continuous Blood Gluc  (FREESTYLE FELY 14 DAY READER) CATHLEEN As directed 12/30/21   Anabel Meneses MD   FreeStyle Lancets MISC 1 each by Does not apply route daily 12/30/21   Anabel Meneses MD   Insulin Pen Needle ([employer] PEN NEEDLES 31G) 31G X 8 MM MISC 1 each by Does not apply route 4 times daily 12/30/21   Anabel Meneses MD   oxyCODONE-acetaminophen (PERCOCET) 5-325 MG per tablet Take 1 tablet by mouth every 4 hours as needed for Pain.     Historical Provider, MD   Insulin Pen Needle (NOVOFINE) 32G X 6 MM MISC qid 9/22/21   Anabel Meneses MD   ammonium lactate (LAC-HYDRIN) 12 % lotion  6/23/21   Historical Provider, MD   atorvastatin (LIPITOR) 80 MG tablet take 1 tablet by mouth at bedtime 8/6/21   Historical Provider, MD   diclofenac sodium (VOLTAREN) 1 % GEL apply 4 grams to affected area three times a day AS NEEDED FOR PAIN 8/22/21   Historical Provider, MD   doxepin (SINEQUAN) 25 MG capsule 75 mg nightly  8/6/21   Historical Provider, MD gardnuoolazine ([facility]) 1000 MG extended release tablet Take 1 tablet by mouth 2 times daily 8/25/21   Anabella Alejo MD   gabapentin (NEURONTIN) 600 MG tablet Take 600 mg by mouth 2 times daily. Historical Provider, MD   RESTASIS 0.05 % ophthalmic emulsion  3/8/21   Historical Provider, MD   neomycin-polymyxin-dexameth (MAXITROL) 3.5-75118-7.1 ophthalmic suspension instill 1 drop into both eyes four times a day 1/5/21   Historical Provider, MD   ARTIFICIAL TEARS 1.4 % ophthalmic solution  3/8/21   Historical Provider, MD   docusate sodium (COLACE, DULCOLAX) 100 MG CAPS Take 100 mg by mouth 2 times daily  Patient taking differently: Take 200 mg by mouth daily At bedtime. 10/3/20   Sloan Bundy MD   sertraline (ZOLOFT) 100 MG tablet Take 200 mg by mouth daily  9/14/20   Historical Provider, MD   furosemide (LASIX) 40 MG tablet Take 1 tablet by mouth daily 9/4/20   Hood Carlson MD   ticagrelor (BRILINTA) 90 MG TABS tablet Take 90 mg by mouth 2 times daily    Historical Provider, MD   CPAP Machine MISC by Does not apply route New CPAP with 10 cm 8/20/20   Nasrin Urbina MD   aspirin 81 MG EC tablet Take 1 tablet by mouth daily 6/30/20   Lyubov Brown MD   OXYGEN 2 lit O2 with sleep , please give O2 concentrator 6/12/20   Nasrin Urbina MD   Emollient (EUCERIN INTENSIVE REPAIR HAND) 2.5-10 % CREA APPLY TO FEET AT BEDTIME; PLACE SOCKS OVER FEET AFTER APPLICATION IF NEEDED FOR DRY CRACKING FEET 3/15/20   Historical Provider, MD   hydrOXYzine (VISTARIL) 25 MG capsule Take 50 mg by mouth 3 times daily as needed  3/4/20   Historical Provider, MD   Nutritional Supplements ([address]) LIQD take as directed three times a day 6/1/20   Historical Provider, MD   isosorbide dinitrate (ISORDIL) 20 MG tablet Take 1 tablet by mouth 3 times daily 4/28/20   Lyubov Brown MD   nitroGLYCERIN (NITROSTAT) 0.4 MG SL tablet up to max of 3 total doses.  If no relief after 1 dose, call 911. 4/28/20   Lyubov Brown MD hydrALAZINE (APRESOLINE) 50 MG tablet Take 1 tablet by mouth every 8 hours 4/28/20   Virgil Lakhani MD   metoprolol succinate (TOPROL XL) 50 MG extended release tablet Take 1 tablet by mouth 2 times daily 4/28/20   Virgil Lakhani MD   haloperidol (HALDOL) 5 MG tablet Take 5 mg by mouth daily    Historical Provider, MD   folic acid (FOLVITE) 1 MG tablet Take 1 mg by mouth daily    Historical Provider, MD   Iron Polysacch Xwkbc-L72-VI (NIFEREX-150 FORTE PO) Take 1 tablet by mouth daily     Historical Provider, MD   Cyanocobalamin (VITAMIN B-12) 1000 MCG extended release tablet Take 1,000 mcg by mouth daily    Historical Provider, MD   meclizine (ANTIVERT) 25 MG tablet Take 25 mg by mouth 2 times daily     Historical Provider, MD       Current Medications:   propofol      fentaNYL      DOPamine      DOPamine      sodium chloride      sodium chloride      sodium chloride 100 mL/hr at 04/27/22 0243    dextrose         IV Medications:  Current Facility-Administered Medications   Medication Dose Route Frequency Provider Last Rate Last Admin    prazosin (MINIPRESS) capsule 1 mg  1 mg Oral Nightly Jacque Farley MD        0.9 % sodium chloride bolus  500 mL IntraVENous Once Jacque Farley .9 mL/hr at 04/27/22 0754 500 mL at 04/27/22 0754    atropine 1 MG/10ML injection             fentaNYL (SUBLIMAZE) injection 50 mcg  50 mcg IntraVENous Once Patsi Saver, APRN - CNP        midazolam PF (VERSED) injection 4 mg  4 mg IntraVENous Once Patsi Saver, APRN - CNP        etomidate (AMIDATE) injection 2,188 mg  40 mg/kg (Ideal) IntraVENous Once Patsi Saver, APRN - CNP        rocuronium [facility]) injection 105 mg  1 mg/kg IntraVENous Once Patsi Saver, APRN - CNP        propofol injection  5-50 mcg/kg/min IntraVENous Continuous Patsi Saver, APRN - CNP        fentaNYL (SUBLIMAZE) 1,000 mcg in sodium chloride 0.9 % 100 mL infusion   mcg/hr IntraVENous Continuous Patsi Saver, APRN - CNP  atropine injection 1 mg  1 mg IntraVENous PRN Aleks Roopae, APRN - CNP        propofol 1000 MG/100ML injection             fentaNYL (SUBLIMAZE) 100 MCG/2ML injection             etomidate (AMIDATE) 2 MG/ML injection             rocuronium (ZEMURON) 50 MG/5ML injection             DOPamine (INTROPIN) 400 mg in dextrose 5 % 250 mL infusion  1-20 mcg/kg/min IntraVENous Continuous Aleks Purdue, APRN - CNP        DOPamine (INTROPIN) 1.6-5 MG/ML-% infusion             sodium bicarbonate 8.4 % injection 50 mEq  50 mEq IntraVENous Once Yani Chandler MD        sodium bicarbonate 8.4 % injection             sodium zirconium cyclosilicate (LOKELMA) oral suspension 10 g  10 g Oral BID Stephani Hooks MD   10 g at 04/26/22 2259    enoxaparin Sodium (LOVENOX) injection 30 mg  30 mg SubCUTAneous Daily Stephani Hooks MD        [facility] by provider] tamsulosin (FLOMAX) capsule 0.4 mg  0.4 mg Oral Dinner Delfino Mckeon MD   0.4 mg at 04/26/22 1628    dextrose 50 % IV solution  12.5 g IntraVENous PRN Joanie Benson MD   12.5 g at 04/25/22 1209    dextrose bolus (hypoglycemia) 10% 250 mL  250 mL IntraVENous Once Joanie Benson MD   Held at 04/25/22 1455    0.9 % sodium chloride infusion  25 mL IntraVENous PRN Joanie Benson MD        sodium chloride flush 0.9 % injection 5-40 mL  5-40 mL IntraVENous 2 times per day Joanie Benson MD   10 mL at 04/24/22 2102    sodium chloride flush 0.9 % injection 5-40 mL  5-40 mL IntraVENous PRN Joanie Benson MD        0.9 % sodium chloride infusion  25 mL IntraVENous PRN Joanie Benson MD        ondansetron (ZOFRAN-ODT) disintegrating tablet 4 mg  4 mg Oral Q8H PRN Joanie Benson MD        Or    ondansetron [facility]) injection 4 mg  4 mg IntraVENous Q6H PRN Joanie Benson MD   4 mg at 04/24/22 1437    polyethylene glycol (GLYCOLAX) packet 17 g  17 g Oral Daily PRN Joanie Benson MD        acetaminophen (TYLENOL) tablet 650 mg  650 mg Oral Q6H PRN Kyleigh Arredondo MD   650 mg at 04/26/22 2331    Or    acetaminophen (TYLENOL) suppository 650 mg  650 mg Rectal Q6H PRN Kyleigh Arredondo MD        0.9 % sodium chloride infusion   IntraVENous Continuous Kyleigh Arredondo  mL/hr at 04/27/22 0243 New Bag at 04/27/22 0243    albuterol (PROVENTIL) nebulizer solution 2.5 mg  2.5 mg Nebulization Q6H PRN Kyleigh Arredondo MD        albuterol sulfate  (90 Base) MCG/ACT inhaler 2 puff  2 puff Inhalation PRN Kyleigh Arredondo MD        sertraline (ZOLOFT) tablet 200 mg  200 mg Oral Daily Kyleigh Arredondo MD   200 mg at 04/26/22 1014    montelukast (SINGULAIR) tablet 10 mg  10 mg Oral Nightly Kyleigh Arredondo MD   10 mg at 04/26/22 2302    [Held by provider] metoprolol succinate (TOPROL XL) extended release tablet 50 mg  50 mg Oral BID Kyleigh Arredondo MD   50 mg at 04/26/22 2301    levothyroxine (SYNTHROID) tablet 50 mcg  50 mcg Oral Daily Kyleigh Arredondo MD   50 mcg at 04/26/22 9743    [Held by provider] isosorbide dinitrate (ISORDIL) tablet 20 mg  20 mg Oral TID Kyleigh Arredondo MD   20 mg at 04/26/22 2302    hydrOXYzine (VISTARIL) capsule 50 mg  50 mg Oral TID PRN Kyleigh Arredondo MD        [Held by provider] haloperidol (HALDOL) tablet 5 mg  5 mg Oral Daily Kyleigh Arredondo MD   5 mg at 04/26/22 1017    [Held by provider] furosemide (LASIX) tablet 40 mg  40 mg Oral Daily Kyleigh Arredondo MD   40 mg at 04/24/22 0815    [Held by provider] doxepin (SINEQUAN) capsule 150 mg  150 mg Oral Nightly Kyleigh Arredondo MD   150 mg at 04/26/22 2300    dicyclomine (BENTYL) capsule 10 mg  10 mg Oral Q6H PRN Kyleigh Arredondo MD        [Held by provider] busPIRone (BUSPAR) tablet 10 mg  10 mg Oral BID Kyleigh Arredondo MD   10 mg at 04/26/22 2302    [Held by provider] atorvastatin (LIPITOR) tablet 80 mg  80 mg Oral Nightly Kyleigh Arredondo MD   80 mg at 04/26/22 2301    polyvinyl alcohol (LIQUIFILM TEARS) 1.4 % ophthalmic solution 1 drop  1 drop Both Eyes BID Gio Matos MD   1 drop at 04/26/22 2259    [Held by provider] ranolazine (RANEXA) extended release tablet 1,000 mg  1,000 mg Oral BID Gio Matos MD   1,000 mg at 04/26/22 2305    meclizine (ANTIVERT) tablet 25 mg  25 mg Oral BID Gio Matos MD   25 mg at 04/26/22 1628    insulin lispro (HUMALOG) injection vial 0-6 Units  0-6 Units SubCUTAneous TID WC Gio Matos MD        insulin lispro (HUMALOG) injection vial 0-3 Units  0-3 Units SubCUTAneous Nightly Gio Matos MD   1 Units at 04/26/22 2320    glucose (GLUTOSE) 40 % oral gel 15 g  15 g Oral PRN Gio Matos MD        glucagon (rDNA) injection 1 mg  1 mg IntraMUSCular PRN Gio Matos MD        dextrose 5 % solution  100 mL/hr IntraVENous PRN Gio Matos MD        [Held by provider] hydrALAZINE (APRESOLINE) tablet 50 mg  50 mg Oral 3 times per day Gio Matos MD   50 mg at 04/24/22 2107    insulin glargine (LANTUS) injection vial 40 Units  40 Units SubCUTAneous Nightly Gio Matos MD   40 Units at 04/26/22 2302         Vital Signs:  Vitals:    04/26/22 1849 04/27/22 0551 04/27/22 0553 04/27/22 0902   BP: (!) 134/106 (!) 91/41 (!) 97/58    Pulse: 69 62 61    Resp:    24   Temp: 97.7 °F (36.5 °C) 97.9 °F (36.6 °C)     TempSrc:       SpO2: 100% 100%  96%   Weight:       Height:           Intake/Output Summary (Last 24 hours) at 4/27/2022 0938  Last data filed at 4/27/2022 0452  Gross per 24 hour   Intake 360 ml   Output 505 ml   Net -145 ml       Patient Vitals for the past 96 hrs (Last 3 readings):   Weight   04/25/22 0622 232 lb 3.2 oz (105.3 kg)   04/25/22 0406 131 lb 4.8 oz (59.6 kg)         Diagnostics:    EKG:  Pending    Telemetry: sinus bradycardia.     Lab Data:  BMP:  Recent Labs     04/25/22  0750 04/25/22  0750 04/26/22  0901 04/26/22  0901 04/27/22 0803   *  --  134*  --   --    K 5.4*  --  5.6*  --   --      --  104  --   --    CO2 18*  --  19*  --   --    BUN 38*  --  50*  --   --    CREATININE 2.19*  --  2.76*  --  3.5*   LABGLOM 22.6*   < > 17.3*   < > 13*    < > = values in this interval not displayed. CBC:  Recent Labs     04/27/22 0803   HGB 8.4*       Cardiac Enzymes:   No results for input(s): CKTOTAL, CKMB, TROPONINI in the last 72 hours. Hepatic Function Panel:  Recent Labs     04/25/22  0750 04/26/22 0901   ALKPHOS 95 88   ALT 13 24   AST 25 57*   PROT 7.4 7.3   BILITOT 0.6 0.4   LABALBU 3.6 3.4*       Magnesium:  No results for input(s): MG in the last 72 hours. Pro-BNP:  Lab Results   Component Value Date    PROBNP 17,443 04/20/2022    PROBNP 21,654 11/17/2021    PROBNP 10,284 11/15/2021       INR:  No results for input(s): PROTIME, INR in the last 72 hours. TSH:  Lab Results   Component Value Date    TSH 1.470 07/01/2021       Lipid Profile:  Lab Results   Component Value Date    TRIG 102 03/09/2022    HDL 41 03/09/2022    LDLCALC 55 03/09/2022       HgbA1C:  Lab Results   Component Value Date    LABA1C 7.6 03/09/2022       Lactate Level:  No results for input(s): LACTA in the last 72 hours. CMP:  Recent Labs     04/25/22  0750 04/26/22  0901 04/27/22 0803   * 134*  --    K 5.4* 5.6*  --     104  --    CO2 18* 19*  --    BUN 38* 50*  --    CREATININE 2.19* 2.76* 3.5*   GLUCOSE 70 106*  --    CALCIUM 8.5 8.9  --    PROT 7.4 7.3  --    LABALBU 3.6 3.4*  --    BILITOT 0.6 0.4  --    ALKPHOS 95 88  --    AST 25 57*  --    ALT 13 24  --        Amylase:  No results for input(s): AMYLASE in the last 72 hours. Lipase:  No results for input(s): LIPASE in the last 72 hours. ABG:  No results for input(s): PH, PO2, PCO2, HCO3, BE, O2SAT in the last 72 hours.       Radiology:     CT ABDOMEN PELVIS WO CONTRAST Additional Contrast? None    Result Date: 4/23/2022  EXAM:  CT ABDOMEN PELVIS WO CONTRAST History: Abdominal pain Technique: Multiple contiguous axial images were obtained of the abdomen and pelvis from the level of the lung bases through the ischial tuberosities without contrast. Multiplanar reformats were obtained. Comparison: CT abdomen pelvis April 20, 2022 Findings: Lung bases are clear. Heart size is enlarged. Mitral annular calcification. Evidence of prior thoracic surgery. Lack of intravenous contrast precludes optimal evaluation of the abdominal and pelvic viscera. The unenhanced liver, spleen, stomach, pancreas, and adrenal glands appear within normal limits. The gallbladder is mildly distended but not significant changed from recent CT. There are debris is present within the gallbladder lumen. No gallbladder wall thickening or pericholecystic fluid identified by CT. Mild bilateral perinephric stranding. No urinary tract calculi or hydronephrosis. The urinary bladder is decompressed. The uterus is absent. Abdominal aorta is nonaneurysmal  . No retroperitoneal or abdominal/pelvic lymphadenopathy. No small bowel obstruction. No overt colonic mass or pericolonic inflammation. No findings of acute appendicitis No free fluid, loculated fluid collection, or pneumoperitoneum. No acute osseous abnormality. Mildly distended gallbladder containing gallbladder sludge and/or tiny gallstones without significant interval change since April 20, 2022 CT. No CT findings of acute cholecystitis. Mild bilateral perinephric stranding is nonspecific. Correlation for polynephritis is recommended. All CT scans at this facility use dose modulation, iterative reconstruction, and/or weight based dosing when appropriate to reduce radiation dose to as low as reasonably achievable. CT ABDOMEN PELVIS W IV CONTRAST Additional Contrast? None    Result Date: 4/20/2022  EXAMINATION: CT ABDOMEN PELVIS W IV CONTRAST DATE AND TIME:4/20/2022 7:19 AM CLINICAL HISTORY: Acute abdominal pain. Epigastric pain.    abd and chest pain x 2 days  COMPARISON: August 15, 2021 TECHNIQUE: Contiguous axial CT sections of the abdomen and pelvis. 100 cc's of IV contrast given. .  All CT scans at this facility use dose modulation, iterative reconstruction, and/or weight based dosing when appropriate to reduce radiation dose to as low as reasonably achievable. Some of this report was completed using  [employer] [id number] and may include unintended errors with respect to translation of words, typographical errors or grammatical errors which may not have been identified prior to the finalization of this report. FINDINGS: Incidental gallstones again noted without secondary signs of acute gallbladder disease. Hepatic steatosis. Spleen pancreas and kidneys are negative. No diverticulitis or colitis. No bowel obstruction. No aneurysm. Bones intact. IMPRESSION: NO ACUTE PATHOLOGY. XR CHEST PORTABLE    Result Date: 4/20/2022  EXAMINATION: XR CHEST PORTABLE CLINICAL HISTORY: CHEST AND ABDOMINAL PAIN FOR 2 DAYS COMPARISONS: CHEST RADIOGRAPH NOVEMBER 16, 2021, CT CHEST NOVEMBER 13, 2021 FINDINGS: Median sternotomy. Cardiopericardial silhouette upper limits normal, unchanged. Ill-defined areas increase opacity lower lung zones bilaterally. BILATERAL LOWER LUNG ATELECTASIS/PNEUMONIA    US ABDOMEN LIMITED    Result Date: 4/23/2022  EXAM: US ABDOMEN LIMITED HISTORY: Right upper quadrant pain TECHNIQUE: Ultrasound evaluation was performed of the right upper quadrant of the abdomen. COMPARISON: CT abdomen pelvis April 23, 2022 FINDINGS: Normal echogenicity and contour of the liver. No liver lesion or intrahepatic biliary dilatation. Main portal vein is patent. The gallbladder is mildly distended. Layering echogenic material is identified within the lumen of the gallbladder. No pericholecystic fluid. Gallbladder wall thickness is at the upper limits of normal measuring 2.7 mm. Negative Velez sign reported.  Common bile duct is normal in diameter measuring 4 mm. No overt abnormality of the pancreas. Nonspecific findings of the gallbladder including mildly distended gallbladder containing layering sludge and/or tiny gallstones with wall thickness of the upper limits of normal measuring 2.7 mm. No pericholecystic fluid to suggest acute cholecystitis. FL CHOLANGIOGRAM OR    Result Date: 4/25/2022  EXAMINATION: FL CHOLANGIOGRAM OR CLINICAL HISTORY:  pain COMPARISONS: None available. FINDINGS: Fluoroscopic assistance was provided during intraoperative cholangiogram. There is contrast opacification of the intrahepatic bile ducts, common hepatic duct, the distal cystic duct, and common bile duct. There is spillage of contrast into the duodenum. There are no persistent filling defects. Number of images: 183 The total radiation time is 12.3 seconds Radiation Dose is 2.71 rad. Fluoroscopic assistance was provided during intraoperative cholangiogram. Please refer to operative report.          Patient Active Problem List   Diagnosis    Major depressive disorder, recurrent, severe with psychotic symptoms ([address]    Diabetes mellitus ([address]    Hypertension    HLD (hyperlipidemia)    Thyroid disease    Congestive heart failure (HCC)    Non-pressure ulcer of toe (HCC)    Atherosclerotic PVD with intermittent claudication (HCC)    Osteomyelitis ([address]    Infection of skin    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    Surgical wound dehiscence, initial encounter    S/P amputation of lesser toe, right (HCC)    Rectal bleeding    Athscl native arteries of left leg w ulceration oth prt foot ([address]    JESICA (obstructive sleep apnea)    Secondary diabetes mellitus ([address]    Chest pain    Peripheral vascular disease ([address]    Cellulitis    Syncope and collapse    Nonrheumatic mitral (valve) insufficiency    Ischemic myocardial dysfunction    Pulmonary hypertension (HCC)    Acute on chronic combined systolic and diastolic congestive heart failure ([facility]    Asthma    RADHA (acute kidney injury) ([facility]    Dizziness    Acute cerebrovascular accident ([facility]    Abnormal gait    Anxiety    Gastritis, unspecified, without bleeding    Pure hypercholesterolemia    Schizophrenia ([facility]    Coronary arteriosclerosis    Extrapyramidal disease    Gangrene of toe (HCC)    Drug-induced hypotension    Osteomyelitis of right foot (HCC)    Nausea and vomiting    Mild intermittent asthma without complication    Heart failure, diastolic, with acute decompensation (HCC)    Major depressive disorder, single episode, unspecified    Type 2 diabetes mellitus with diabetic neuropathy, unspecified (HCC)    Obesity (BMI 30-39. 9)    Disorder of carotid artery (HCC)    NSTEMI (non-ST elevated myocardial infarction) ([facility] [address]    Pneumonia    CKD (chronic kidney disease) stage 3, GFR 30-59 ml/min ([facility])    Pain in right hand    Anesthesia of skin    Carpal tunnel syndrome    History of angioplasty of peripheral vessel    History of coronary artery bypass surgery    Paresthesia of skin    Acute decompensated heart failure (HCC)    Depression    Abdominal pain    Abdominal pain, right upper quadrant             Thank you for the opportunity to participate in the care of your patient. Do not hesitate to call if you have any questions. Electronically signed by ESDRAS Wilson CNP, [place] on 4/27/2022 at 9:38 AM       Summary   Compared to study of 6/29/2020, LVEF decreased, RVSP increased. Left ventricular ejection fraction is visually estimated at 35-40%. Severe anteroseptal hypokinesis   Papillary muscle hypertrophy   Diastolic dysfunction   Mildly enlarged right ventricle cavity. Right ventricle global systolic function is mildly reduced . Right ventricular systolic pressure of 62 mm Hg consistent with severe   pulmonary hypertension. Normal tricuspid valve structure and function. Moderate-to-severe tricuspid regurgitation.    Normal pulmonic valve structure   2+ PI, No PS   Normal right atrium. Normal mitral valve structure   Mild MV leaflet thickening   2+ MR   Mildly dilated left atrium.

## 2022-04-27 NOTE — PROGRESS NOTES
[facility] Pharmacokinetic Monitoring Service - Vancomycin     Clary Allen is a 61 y.o. female starting on vancomycin therapy for sepsis. Pharmacy consulted by Martha Rubio NP for monitoring and adjustment. Target Concentration: Dosing based on anticipated concentration <15 mg/L due to renal impairment/insufficiency    Additional Antimicrobials: zosyn    Pertinent Laboratory Values: Wt Readings from Last 1 Encounters:   04/25/22 232 lb 3.2 oz (105.3 kg)     Temp Readings from Last 1 Encounters:   04/27/22 97.9 °F (36.6 °C)     Estimated Creatinine Clearance: 19 mL/min (A) (based on SCr of 3.6 mg/dL (H)). Recent Labs     04/27/22  0600 04/27/22  0803 04/27/22  1037   CREATININE 2.99* 3.5* 3.6*   WBC 9.5  --   --      Procalcitonin: 0.33    Pertinent Cultures:  Culture Date Source Results   04/27/22 blood pending   MRSA Nasal Swab: N/A. Non-respiratory infection. Plan:  Concentration-guided dosing due to renal impairment/insufficiency and critical nature of patient (renal function has been declining since admission).  If renal function stabilizes consider Bayesian dosing  Start vancomycin 1000 mg x 1  Goal of trough < 15  Renal labs as indicated   Vancomycin concentration ordered for 24 hours 04/27/22 @ 1200 (24 hours)   Pharmacy will continue to monitor patient and adjust therapy as indicated    Thank you for the consult,  Niall Smith, [address] [place] [address]  4/27/2022 11:48 AM

## 2022-04-27 NOTE — PLAN OF CARE
Nutrition Problem #1: Inadequate oral intake  Intervention: Food and/or Nutrient Delivery: Continue Current Tube Feeding (Continue peptide based High Protein TF @ 20 ml/hr x 24 hrs  50 ml water flush every 4 hrs  Monitor ability to modify TF goal rate to meet estimated nutrition needs)  Nutritional

## 2022-04-28 NOTE — PROGRESS NOTES
Progress Note  Date:2022       Room:[place] [address]  Rodolfo Jain     YOB: 1955     Age:66 y.o.    ROS: Point review system cannot be obtained due to acuity of medical condition. Subjective      Objective         Vitals Last 24 Hours:  TEMPERATURE:  Temp  Av.2 °F (35.7 °C)  Min: 94.6 °F (34.8 °C)  Max: 98.3 °F (36.8 °C)  RESPIRATIONS RANGE: Resp  Av.5  Min: 12  Max: 32  PULSE OXIMETRY RANGE: SpO2  Av.6 %  Min: 91 %  Max: 100 %  PULSE RANGE: Pulse  Av.2  Min: 53  Max: 86  BLOOD PRESSURE RANGE: No data recorded.  ; No data recorded. I/O (24Hr): Intake/Output Summary (Last 24 hours) at 2022 1030  Last data filed at 2022 0719  Gross per 24 hour   Intake 91913.13 ml   Output 3385 ml   Net 7787.13 ml     Objective:  General Appearance:  Ill-appearing. Vital signs: (most recent): Blood pressure (!) 97/58, pulse 61, temperature 97.3 °F (36.3 °C), temperature source Rectal, resp. rate 12, height 5' 4\" (1.626 m), weight 245 lb 6 oz (111.3 kg), SpO2 100 %, not currently breastfeeding. HEENT: Normal HEENT exam.    Heart: S1 normal and S2 normal.    Abdomen: Abdomen is soft. Bowel sounds are normal.   There is no abdominal tenderness. Pulses: Distal pulses are intact. Neurological: (Sedated, minimal responsive). Pupils:  Pupils are equal, round, and reactive to light. Skin:  Warm and dry. Labs/Imaging/Diagnostics    Labs:  CBC:  Recent Labs     22  0600 22  0803 22  1357 22  0450 22  0558   WBC 9.5  --  15.0*  --  8.5   RBC 3.47*  --  3.68*  --  3.09*   HGB 8.5*   < > 9.1* 7.8* 7.7*   HCT 25.9*  --  27.3*  --  23.1*   MCV 74.8*  --  74.1*  --  74.7*   RDW 19.1*  --  19.3*  --  19.0*     --  179  --  136    < > = values in this interval not displayed.      CHEMISTRIES:  Recent Labs     22  0600 22  0803 22  2221 22  0450 22  0557   *  --  132*  --  136   K 4.6  --  4.0  --  3.5     --  103  --  106   CO2 20  --  20  --  20   BUN 59*  --  51*  --  45*   CREATININE 2.99*   < > 2.17* 1.9* 1.75*   GLUCOSE 183*  --  204*  --  121*   PHOS  --   --   --   --  2.2*   MG 2.1  --   --   --  2.2    < > = values in this interval not displayed. PT/INR:  Recent Labs     04/27/22  1017 04/27/22 2034   PROTIME 16.4* 17.3*   INR 1.3 1.4     APTT:  Recent Labs     04/27/22 2034   APTT 149.9*     LIVER PROFILE:  Recent Labs     04/26/22  0901 04/27/22  0600 04/28/22  0557   AST 57* 52* 58*   ALT 24 17 15   BILITOT 0.4 0.5 0.4   ALKPHOS 88 84 75       Imaging Last 24 Hours:  CT ABDOMEN PELVIS WO CONTRAST Additional Contrast? None    Result Date: 4/23/2022  EXAM:  CT ABDOMEN PELVIS WO CONTRAST History: Abdominal pain Technique: Multiple contiguous axial images were obtained of the abdomen and pelvis from the level of the lung bases through the ischial tuberosities without contrast. Multiplanar reformats were obtained. Comparison: CT abdomen pelvis April 20, 2022 Findings: Lung bases are clear. Heart size is enlarged. Mitral annular calcification. Evidence of prior thoracic surgery. Lack of intravenous contrast precludes optimal evaluation of the abdominal and pelvic viscera. The unenhanced liver, spleen, stomach, pancreas, and adrenal glands appear within normal limits. The gallbladder is mildly distended but not significant changed from recent CT. There are debris is present within the gallbladder lumen. No gallbladder wall thickening or pericholecystic fluid identified by CT. Mild bilateral perinephric stranding. No urinary tract calculi or hydronephrosis. The urinary bladder is decompressed. The uterus is absent. Abdominal aorta is nonaneurysmal  . No retroperitoneal or abdominal/pelvic lymphadenopathy. No small bowel obstruction. No overt colonic mass or pericolonic inflammation.  No findings of acute appendicitis No free fluid, loculated fluid collection, or pneumoperitoneum. No acute osseous abnormality. Mildly distended gallbladder containing gallbladder sludge and/or tiny gallstones without significant interval change since April 20, 2022 CT. No CT findings of acute cholecystitis. Mild bilateral perinephric stranding is nonspecific. Correlation for polynephritis is recommended. All CT scans at this facility use dose modulation, iterative reconstruction, and/or weight based dosing when appropriate to reduce radiation dose to as low as reasonably achievable. US ABDOMEN LIMITED    Result Date: 4/23/2022  EXAM: US ABDOMEN LIMITED HISTORY: Right upper quadrant pain TECHNIQUE: Ultrasound evaluation was performed of the right upper quadrant of the abdomen. COMPARISON: CT abdomen pelvis April 23, 2022 FINDINGS: Normal echogenicity and contour of the liver. No liver lesion or intrahepatic biliary dilatation. Main portal vein is patent. The gallbladder is mildly distended. Layering echogenic material is identified within the lumen of the gallbladder. No pericholecystic fluid. Gallbladder wall thickness is at the upper limits of normal measuring 2.7 mm. Negative Velez sign reported. Common bile duct is normal in diameter measuring 4 mm. No overt abnormality of the pancreas. Nonspecific findings of the gallbladder including mildly distended gallbladder containing layering sludge and/or tiny gallstones with wall thickness of the upper limits of normal measuring 2.7 mm. No pericholecystic fluid to suggest acute cholecystitis. Assessment//Plan           Hospital Problems           Last Modified POA    * (Principal) Abdominal pain 4/23/2022 Yes    Abdominal pain, right upper quadrant 4/25/2022 Yes      acute heather  CKD 3  Diabetes  CAD  RADHA on CKD  Hyperkalemia  Shock  Gram positive septicemia  sepsis  Assessment & Plan    4/24: Tegretol and aspirin for now as per surgery recommendation. For surgery for tomorrow. Sugars better controlled.   She has right upper quadrant pain. Possible cholecystectomy tomorrow. 4/25: Patient is going for lap heather today. Anticipate discharge tomorrow. No overnight events no new complaints. Continue current care.hold nephrotoxic agents, cw IVF.  4/26: Acute urinary retention s/p Colon, urology evaluation, renal function is worsening we will get nephrology evaluation, treat hyperkalemia. Spoke with nursing. 4/27: He upon entering room patient is lethargic, unable to arouse. Spoke with the  at bedside. Blood pressure is low, used  to speak with them. Transfer patient to ICU for hypotension/shock Patient was started on Flomax yesterday. We will get psych eval for polypharmacy. Patient mental status and condition is to be changed from yesterday. Spoke with cardiac critical care team about the care plan  CC time was 45 min  4/28: Spoke with the daughter-in-law, patient intubated and is on pressors. Taper down pressors as tolerated. Patient is on heparin drip for possible PE, renal function is improving. Questionable complete heart block.   Follow cardiology, continue IV antibiotic for gram-positive septicemia with multiorgan failure,  Electronically signed by Marlene Rm MD on 4/24/22 at 11:41 AM EDT    Subjective      Review of Systems

## 2022-04-28 NOTE — CARE COORDINATION
AM ROUNDS COMPLETED, PT REMAINS INTUBATED ON MULTIPLE GTT. POSSIBLE EXTUBATION LATER TODAY IF ABLE.  LSW/CM TO FOLLOW. [address], PT HAS NON SKILLED CARE ONLY, HHA. NO RESUME ORDER NEEDED. WILL NEED PT/OT ONCE EXTUBATED TO DETERMINE PTS NEEDS. [address] [facility] WOULD BE ABLE TO OFFER SN IF NEEDED, NO PT/OT.   D/T + CARMEN HUGGINS CHECKING WITH DON IF SN COULD DO IV'S AT HOME/TEACHING

## 2022-04-28 NOTE — PROGRESS NOTES
Nephrology Progress Note    Assessment:  61 y.o. female with history s/f T2DM, HTN, HLD, PAD, hypothyroidism, asthma who presented w/ RUQ/epigastric pain for ~1 week.     1. RADHA on CKD stage III: most likely 2/2 hypotension +/- urinary retention , function already worsening prior to surgery, baseline Scr ~1.1-1.2 w/ eGFR high 40s/low 50s, got toradol on 4/22,   2. Hyperkalemia  3. Hyponatremia  4. Hypoalbuminemia  5. Acute cholecystitis  6. Acute urinary retention: urology onboard   7. Encephalopathy: now intubated (4/27)  8. Shock: on pressors and inotrope     Plan:  - given positive fluid balance will give lasix and kcl.   ivf stopped  - dc [facility]  - d/w family       Patient Active Problem List:     Major depressive disorder, recurrent, severe with psychotic symptoms ([address]     Diabetes mellitus ([address]     Hypertension     HLD (hyperlipidemia)     Thyroid disease     Congestive heart failure (HCC)     Non-pressure ulcer of toe (HCC)     Atherosclerotic PVD with intermittent claudication (HCC)     Osteomyelitis ([address]     Infection of skin     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     Surgical wound dehiscence, initial encounter     S/P amputation of lesser toe, right (HCC)     Rectal bleeding     Athscl native arteries of left leg w ulceration oth prt foot (HCC)     JESICA (obstructive sleep apnea)     Secondary diabetes mellitus (HCC)     Chest pain     Peripheral vascular disease (HCC)     Cellulitis     Syncope and collapse     Nonrheumatic mitral (valve) insufficiency     Ischemic myocardial dysfunction     Pulmonary hypertension (HCC)     Acute on chronic combined systolic and diastolic congestive heart failure (HCC)     Asthma     RADHA (acute kidney injury) ([address]     Dizziness     Acute cerebrovascular accident ([address]     Abnormal gait     Anxiety     Gastritis, unspecified, without bleeding     Pure hypercholesterolemia     Schizophrenia ([address]     Coronary arteriosclerosis     Extrapyramidal disease     Gangrene of toe ([facility]     Drug-induced hypotension     Osteomyelitis of right foot ([facility])     Nausea and vomiting     Mild intermittent asthma without complication     Heart failure, diastolic, with acute decompensation ([facility])     Major depressive disorder, single episode, unspecified     Type 2 diabetes mellitus with diabetic neuropathy, unspecified ([facility])     Obesity (BMI 30-39. 9)     Disorder of carotid artery ([facility])     NSTEMI (non-ST elevated myocardial infarction) ([facility])     Pneumonia     CKD (chronic kidney disease) stage 3, GFR 30-59 ml/min ([facility])     Pain in right hand     Anesthesia of skin     Carpal tunnel syndrome     History of angioplasty of peripheral vessel     History of coronary artery bypass surgery     Paresthesia of skin     Acute decompensated heart failure (HCC)     Depression     Abdominal pain     Abdominal pain, right upper quadrant      Subjective:  Admit Date: 4/22/2022    Interval History: pt intubated but awake. cvp around 20.   Making urine but positive fluid balance    Medications:  Scheduled Meds:   prazosin  1 mg Oral Nightly    fentanNYL  50 mcg IntraVENous Once    rocuronium  1 mg/kg IntraVENous Once    pantoprazole (PROTONIX) 40 mg injection  40 mg IntraVENous Daily    chlorhexidine  15 mL Mouth/Throat BID    piperacillin-tazobactam  3,375 mg IntraVENous Q8H    sennosides-docusate sodium  2 tablet Oral BID    insulin lispro  0-6 Units SubCUTAneous 4 times per day    vancomycin (VANCOCIN) intermittent dosing (placeholder)   Other RX Placeholder    sodium zirconium cyclosilicate  10 g Oral BID    dextrose bolus (hypoglycemia)  250 mL IntraVENous Once    sodium chloride flush  5-40 mL IntraVENous 2 times per day    sertraline  200 mg Oral Daily    montelukast  10 mg Oral Nightly    [Held by provider] metoprolol succinate  50 mg Oral BID    levothyroxine  50 mcg Oral Daily    [Held by provider] isosorbide dinitrate  20 mg Oral TID    [Held by provider] haloperidol  5 mg Oral Daily    [Held by provider] furosemide  40 mg Oral Daily    [Held by provider] doxepin  150 mg Oral Nightly    [Held by provider] busPIRone  10 mg Oral BID    [Held by provider] atorvastatin  80 mg Oral Nightly    polyvinyl alcohol  1 drop Both Eyes BID    [Held by provider] ranolazine  1,000 mg Oral BID    [Held by provider] meclizine  25 mg Oral BID    [Held by provider] hydrALAZINE  50 mg Oral 3 times per day    insulin glargine  40 Units SubCUTAneous Nightly     Continuous Infusions:   propofol 15 mcg/kg/min (04/28/22 0615)    fentaNYL 50 mcg/hr (04/28/22 0615)    DOPamine Stopped (04/27/22 1300)    norepinephrine Stopped (04/27/22 1855)    heparin (PORCINE) Infusion 10 Units/kg/hr (04/28/22 0615)    sodium chloride      sodium chloride      sodium chloride 100 mL/hr at 04/28/22 0615    dextrose         CBC:   Recent Labs     04/27/22  1357 04/27/22  1357 04/28/22  0450 04/28/22  0558   WBC 15.0*  --   --  8.5   HGB 9.1*   < > 7.8* 7.7*     --   --  136    < > = values in this interval not displayed. CMP:    Recent Labs     04/27/22  0600 04/27/22  0803 04/27/22  2221 04/28/22  0450 04/28/22  0557   *  --  132*  --  136   K 4.6  --  4.0  --  3.5     --  103  --  106   CO2 20  --  20  --  20   BUN 59*  --  51*  --  45*   CREATININE 2.99*   < > 2.17* 1.9* 1.75*   GLUCOSE 183*  --  204*  --  121*   CALCIUM 7.6*  --  8.4*  --  7.8*   LABGLOM 15.7*   < > 22.8* 27* 29.2*    < > = values in this interval not displayed. Troponin:   Recent Labs     04/27/22 2000   TROPONINI 0.372*     BNP: No results for input(s): BNP in the last 72 hours. INR:   Recent Labs     04/27/22 2034   INR 1.4     Lipids: No results for input(s): CHOL, LDLDIRECT, TRIG, HDL, AMYLASE, LIPASE in the last 72 hours.   Liver:   Recent Labs     04/28/22  0557   AST 58*   ALT 15   ALKPHOS 75   PROT 5.5*   LABALBU 2.7*   BILITOT 0.4     Iron:  No results for input(s): IRONS, FERRITIN in the last 72 hours.    Invalid input(s): LABIRONS  Urinalysis: No results for input(s): UA in the last 72 hours.     Objective:  Vitals: BP (!) 97/58   Pulse 61   Temp 97.3 °F (36.3 °C) (Rectal)   Resp 12   Ht 5' 4\" (1.626 m)   Wt 245 lb 6 oz (111.3 kg)   SpO2 100%   BMI 42.12 kg/m²    Wt Readings from Last 3 Encounters:   04/28/22 245 lb 6 oz (111.3 kg)   04/20/22 225 lb (102.1 kg)   04/11/22 226 lb (102.5 kg)      24HR INTAKE/OUTPUT:      Intake/Output Summary (Last 24 hours) at 4/28/2022 5989  Last data filed at 4/28/2022 0719  Gross per 24 hour   Intake 20925.13 ml   Output 3385 ml   Net 7787.13 ml     General: intubated, sedated, morbidly obese   HEENT: normocephalic, atraumatic, ETT in place  Lungs: intubated, on vent, coarse breath sounds  Heart: regular rate and rhythm, no murmurs or rubs  Abdomen: soft, non-tender, non-distended  Ext: no cyanosis, ++ peripheral edema  Neuro: unable to assess, sedated      Electronically signed by Melissa Burden MD, MD

## 2022-04-28 NOTE — PROGRESS NOTES
Marc Veras is a 5year old female who was brought in for this visit. History was provided by the CAREGIVER  HPI:   Patient presents with:  Vomiting: Started this am-at 0830, 7 episodes-last one at 0920. No fever.  Abdominal pain       HPI  Seeing GI 1 PHARMACY NOTE:   ICU Rounds Attended (10-15 minutes in patient room):    Pt diagnosis: abdominal pain     IV Fluids: NS @ 100mL/hr + 500mL LR bolus added    Received lasix 40mg IV X 1 dose today     Renal:   Recent Labs     04/28/22  0450 04/28/22  0557 04/28/22  1045   CREATININE 1.9* 1.75* 1.7*    Estimated Creatinine Clearance: 41 mL/min (A) (based on SCr of 1.7 mg/dL (H)).         Antimicrobial Therapy:   Day 2   Antimicrobial agents: Zosyn                                     Vancomycin, pharmacy to dose, level pending for 1200 today   Cultures: gramp + cocci in clusters, resembling straph    ID on consult: no    Recent Labs     04/27/22  0600 04/27/22  1357 04/28/22  0558   WBC 9.5 15.0* 8.5           Pressors:   norepinephrine @ 10 mcg/min --> 0.089 mcg/kg/min  Sedation: off for SBT   Drips: dopamine OFF at this time     Insulin Therapy (goal: 140-180): low dose sliding scale coverage with Humalog   5 units given past 24 hours   Lantus 40 units QHS    Recent Labs     04/27/22  0600 04/27/22  2221 04/28/22  0557   GLUCOSE 183* 204* 121*       Steroid Therapy: none at this time   Stress Ulcer Prophylaxis:   Pantoprazole 40mg IV daily    On at home: no    DVT Prophylaxis/Anticoagulant Therapy: heparin drip   Recent Labs     04/27/22  0600 04/27/22  1357 04/28/22  0558    179 136     Recent Labs     04/27/22  1017 04/27/22  2034   INR 1.3 1.4         Bowel Regimen:   Miralax daily PRN   Senna S BID     IV to PO: none at this time    Diet (NPO, tube feeds, TPN): tube feeds    Oxygen Therapy: intubated      Follow up/Changes:   LR bolus added   Vancomycin consult   Monitoring renal function (Zosyn)      Kourtney PedersonD, BCPS   4/28/2022 11:21 AM abdominal pain   Extremites: no deformities  Skin no rash, no abnormal bruising  Psychologic: behavior appropriate for age      ASSESSMENT AND PLAN:  Diagnoses and all orders for this visit:    Recurrent vomiting  -     MRI BRAIN (W+WO) (CPT=70553);  Future

## 2022-04-28 NOTE — PROGRESS NOTES
Pharmacy Vancomycin Consult     Vancomycin Day: 2  Current Dosinmg x 1 dose.  1406      Recent Labs     22  0600 22  1357 22  2221 22  0450 22  0557 22  0558 22  1045   BUN   < >  --  51*  --  45*  --   --    CREATININE   < >  --  2.17*   < > 1.75*  --  1.7*   WBC  --  15.0*  --   --   --  8.5  --     < > = values in this interval not displayed. Intake/Output Summary (Last 24 hours) at 2022 1845  Last data filed at 2022 1757  Gross per 24 hour   Intake 2538.64 ml   Output 3310 ml   Net -771.36 ml     Cultures  Recent Labs     22  1030   BC Gram stain aerobic bottle  Gram positive cocci in clusters-resembling Staph  2 out of 2 blood cultures  Further testing performed at [address] [place] [address] Gram stain aerobic bottle  Gram positive cocci in clusters-resembling Staph  2 out of 2 blood cultures  Further testing performed at [facility] 80  *     Height: 5' 4\" (162.6 cm), Weight: 245 lb 6 oz (111.3 kg), Body mass index is 42.12 kg/m². Estimated Creatinine Clearance: 41 mL/min (A) (based on SCr of 1.7 mg/dL (H)). .    Trough:  Recent Labs     22  1200   VANCORANDOM 5.5*      Assessment/Plan:  Data input into [employer] platform. Recommend dosing at 750mg IV q24h (auc 410 trough 14.8). Plan random level in 24-48 hours to further evaluate dosing. Timing of future trough levels may be adjusted based on culture results, renal function, and clinical response.     Thank you,  Margo Wagner, [facility] PharmD

## 2022-04-28 NOTE — PROGRESS NOTES
Pulmonary & Critical Care Medicine ICU Progress Note  Chief complaint : Acute respiratory failure    Subjunctive/24 hour events :   Patient seen and examined during multidisciplinary rounds with RN, charge nurse, RT, pharmacy, dietitian, and social service. Awake on vent, she followed commands, back on Levophed this morning, on heparin drip, no active bleed, she is on fentanyl and propofol, on 50% FiO2 and 5 of PEEP saturation 90%, no fever, urine output 3 L, she is +11.7 L,no bowel movement      Social History     Tobacco Use    Smoking status: Never Smoker    Smokeless tobacco: Never Used   Substance Use Topics    Alcohol use: Never     History reviewed. No pertinent family history. Recent Labs     04/27/22  1350 04/28/22  0450   PHART 7.304* 7.448   [id number] 36 29*   PO2ART 112* 118*       MV Settings:  Vent Mode: CPAP Resp Rate (Set): 32 bmp/Vt (Set, mL): 330 mL/ /FiO2 : 50 %           IV:   propofol 15 mcg/kg/min (04/28/22 0615)    fentaNYL 50 mcg/hr (04/28/22 0615)    DOPamine Stopped (04/27/22 1300)    norepinephrine Stopped (04/27/22 1855)    heparin (PORCINE) Infusion 10 Units/kg/hr (04/28/22 0615)    sodium chloride      sodium chloride      dextrose         Vitals:  BP (!) 97/58   Pulse 61   Temp 97.3 °F (36.3 °C) (Rectal)   Resp 12   Ht 5' 4\" (1.626 m)   Wt 245 lb 6 oz (111.3 kg)   SpO2 100%   BMI 42.12 kg/m²    Tmax:        Intake/Output Summary (Last 24 hours) at 4/28/2022 0838  Last data filed at 4/28/2022 0719  Gross per 24 hour   Intake 37096.13 ml   Output 3385 ml   Net 7787.13 ml       EXAM:  General: Awake, on vent, follows simple commands  Head: normocephalic, atraumatic  Eyes:No gross abnormalities. ENT:  MMM no lesions  Neck:  supple and no masses  Chest : Good air movement, vent sounds, no wheezing, no rales, nontender, tympanic  Heart[de-identified] Heart sounds are normal.  Regular rate and rhythm without murmur, gallop or rub.   ABD:  symmetric, soft, mild tenderness, LA drain in, no guarding or rebound  Musculoskeletal : no cyanosis, no clubbing and no edema  Neuro:  Awake, follows simple commands  Skin: No rashes or nodules noted.   Lymph node:  no cervical nodes  Urology: Yes Colon   Psychiatric: Calm    Medications:  Scheduled Meds:   furosemide  40 mg IntraVENous Once    potassium chloride  20 mEq IntraVENous Q2H    prazosin  1 mg Oral Nightly    fentanNYL  50 mcg IntraVENous Once    rocuronium  1 mg/kg IntraVENous Once    pantoprazole (PROTONIX) 40 mg injection  40 mg IntraVENous Daily    chlorhexidine  15 mL Mouth/Throat BID    piperacillin-tazobactam  3,375 mg IntraVENous Q8H    sennosides-docusate sodium  2 tablet Oral BID    insulin lispro  0-6 Units SubCUTAneous 4 times per day    vancomycin (VANCOCIN) intermittent dosing (placeholder)   Other RX Placeholder    dextrose bolus (hypoglycemia)  250 mL IntraVENous Once    sodium chloride flush  5-40 mL IntraVENous 2 times per day    sertraline  200 mg Oral Daily    montelukast  10 mg Oral Nightly    [Held by provider] metoprolol succinate  50 mg Oral BID    levothyroxine  50 mcg Oral Daily    [Held by provider] isosorbide dinitrate  20 mg Oral TID    [Held by provider] haloperidol  5 mg Oral Daily    [Held by provider] doxepin  150 mg Oral Nightly    [Held by provider] busPIRone  10 mg Oral BID    [Held by provider] atorvastatin  80 mg Oral Nightly    polyvinyl alcohol  1 drop Both Eyes BID    [Held by provider] ranolazine  1,000 mg Oral BID    [Held by provider] meclizine  25 mg Oral BID    [Held by provider] hydrALAZINE  50 mg Oral 3 times per day    insulin glargine  40 Units SubCUTAneous Nightly       PRN Meds:  atropine, heparin (porcine), heparin (porcine), dextrose, sodium chloride, sodium chloride flush, sodium chloride, ondansetron **OR** ondansetron, polyethylene glycol, acetaminophen **OR** acetaminophen, albuterol, albuterol sulfate HFA, hydrOXYzine, dicyclomine, glucose, glucagon (rDNA), dextrose    Results: reviewed by me   CBC:   Recent Labs     04/27/22  0600 04/27/22  0803 04/27/22  1357 04/28/22  0450 04/28/22  0558   WBC 9.5  --  15.0*  --  8.5   HGB 8.5*   < > 9.1* 7.8* 7.7*   HCT 25.9*  --  27.3*  --  23.1*   MCV 74.8*  --  74.1*  --  74.7*     --  179  --  136    < > = values in this interval not displayed. BMP:   Recent Labs     04/27/22  0600 04/27/22  0803 04/27/22  2221 04/28/22  0450 04/28/22  0557   *  --  132*  --  136   K 4.6  --  4.0  --  3.5     --  103  --  106   CO2 20  --  20  --  20   PHOS  --   --   --   --  2.2*   BUN 59*  --  51*  --  45*   CREATININE 2.99*   < > 2.17* 1.9* 1.75*    < > = values in this interval not displayed. LIVER PROFILE:   Recent Labs     04/26/22  0901 04/27/22  0600 04/28/22  0557   AST 57* 52* 58*   ALT 24 17 15   BILITOT 0.4 0.5 0.4   ALKPHOS 88 84 75     PT/INR:   Recent Labs     04/27/22  1017 04/27/22 2034   PROTIME 16.4* 17.3*   INR 1.3 1.4     APTT:   Recent Labs     04/27/22 2034   APTT 149.9*     UA:No results for input(s): NITRITE, COLORU, PHUR, LABCAST, WBCUA, RBCUA, MUCUS, TRICHOMONAS, YEAST, BACTERIA, CLARITYU, SPECGRAV, LEUKOCYTESUR, UROBILINOGEN, BILIRUBINUR, BLOODU, GLUCOSEU, AMORPHOUS in the last 72 hours. Invalid input(s): Aris Loaiza    Cultures:  Blood culture positive for gram-positive cocci resembling staph  Films:  CXR reviewed by me and it showed no infiltrate, cardiomegaly, congested      Assessment: This is a critically ill patient at risk of deterioration / death , needing close ICU monitoring and intervention due to below noted problems   · Gram-positive septicemia with multiorgan failure,    · Septic shock  · ?   PE, however less likely  · Acute hypoxic and hypercapnic respiratory failure,  · Acute encephalopathy, improved  · Acute on chronic renal failure, renal function improved  · Pulmonary hypertension, group 2 and 3  · Bradycardia with complete heart block, currently resolved  · History of JESICA, compliant with CPAP    Recommendation   · Vent support lung protective strategy  · head of the bed 30°  · Daily sedation holidays and breathing trials  · Sedation with combination propofol and fentanyl target R ASS of 0 to -1  · Watch for ICU delirium: TV on, natural light, avoid benzos, pain control, early mobility, and family engagement  · PUD prophylaxis  · DVT prophylaxis  · DC pressors  · Continue current antibiotics  · Consult ID   · Monitor renal function and urine output  · Target blood sugar 140-180  · Trophic tube feed   · Currently on heparin drip, watch for bleeding  · VQ scan if low probability, DC heparin drip    Discussed with Dr. Dacia Curran      due to the immediate potential for life-threatening deterioration due to acute respiratory failure I spent 32  minutes providing critical care.  This time is excluding time spent performing procedures.           Electronically signed by Juan Carlos Sosa MD,  FCCP ,on 4/28/2022 at 8:38 AM

## 2022-04-28 NOTE — FLOWSHEET NOTE
2000 On vent sedated with propofol and fentanyl unresponsive not following any commands. 2200 XA 0.63 no change in heparin  Repeat XA 6 hrs later was 0.92 heparin gtt decreased by 2 units/kg/hr. Tube feed started at 20 cc hr per OG. MP SB. Warming blanket on for decreased body temp. 65 Dr. Dominick Villalpando ordered BMP  And made aware of results. 0400 Propofol now at 15 mcg pt attempting to open eyes but not following commands  Good urine output per park. Complete bath  and park care given  BP stable.

## 2022-04-28 NOTE — PROGRESS NOTES
CARDIOLOGY [address] PROGRESS NOTE         4/28/2022      Jonathon Hammonds    [id number]  1955    Rounding MD: Mattie Severe, MD ,[facility] [place]    Primary Cardiologist: Wilfred Huhges MD [address] [place]      SUBJECTIVE:    Looks better overall. In ICU, Looks like post operative infection primary issue, Blood cultures pending. Discussed case with Dr Jelani Barker, intensivist.    Family present. Cardiac and general ROS otherwise negative and unchanged. ASSESSMENT:    1. Post recent Cholecystectomy  2. MS Changes / Encephalopathy  3. Shock, Septic probably primary, cardiogenic secondarily  4. CHB, Transient, now resolved. 5. NSTEMI  6. PHTN with RVE , RVSP 60  7. VHD, MR and TR, Moderate  8. CKD  9. Chest pain Hx, reproducible, musculoskeletal, noncardiac  10. CHF, Systolic, acute on chronic  11. CAD post CABG June 2019 [facility], [place] to [place], PCI to left circumflex and right coronary artery, last April 2020. Post catheterization April 2021, medical treatment advised. 12. Negative Myoview perfusion stress test, 8/2021. 13. Prior Ischemic cardiomyopathy with apical hypokinesia LVEF 35 to 40%. 14. PVOD post right lower extremity revascularization,  and [facility] Dr Idania Murphy, (details not available), Right third toe amputation site infection. 15. Carotid artery disease with ultrasound noted 50-69% bilateral stenoses May 2019. 16. Hypothyroidism  17. Hypertension  18. Hyperlipidemia  19. Diabetes  20. Depression  21. Schizophrenia  22. Family history of coronary artery disease  21. Multiple allergies as noted. PLAN:    1. Supportive Intensive Care  2. IV Antibiotics  3. IV medications otherwise as tolerated, including IV metoprolol. 4. Serial troponins  5. Possible cardiac cath as warranted. 6. Further recommendations to follow  7.  See orders      OBJECTIVE:     MEDICATIONS:     Scheduled Meds:   potassium chloride  20 mEq IntraVENous Q2H    prazosin  1 mg Oral Nightly    fentanNYL  50 mcg IntraVENous Once    rocuronium  1 mg/kg IntraVENous Once    pantoprazole (PROTONIX) 40 mg injection  40 mg IntraVENous Daily    chlorhexidine  15 mL Mouth/Throat BID    piperacillin-tazobactam  3,375 mg IntraVENous Q8H    sennosides-docusate sodium  2 tablet Oral BID    insulin lispro  0-6 Units SubCUTAneous 4 times per day    vancomycin (VANCOCIN) intermittent dosing (placeholder)   Other RX Placeholder    dextrose bolus (hypoglycemia)  250 mL IntraVENous Once    sodium chloride flush  5-40 mL IntraVENous 2 times per day    sertraline  200 mg Oral Daily    montelukast  10 mg Oral Nightly    [Held by provider] metoprolol succinate  50 mg Oral BID    levothyroxine  50 mcg Oral Daily    [Held by provider] isosorbide dinitrate  20 mg Oral TID    [Held by provider] haloperidol  5 mg Oral Daily    [Held by provider] doxepin  150 mg Oral Nightly    [Held by provider] busPIRone  10 mg Oral BID    [Held by provider] atorvastatin  80 mg Oral Nightly    polyvinyl alcohol  1 drop Both Eyes BID    [Held by provider] ranolazine  1,000 mg Oral BID    [Held by provider] meclizine  25 mg Oral BID    [Held by provider] hydrALAZINE  50 mg Oral 3 times per day    insulin glargine  40 Units SubCUTAneous Nightly     Continuous Infusions:   propofol 15 mcg/kg/min (04/28/22 0615)    fentaNYL 50 mcg/hr (04/28/22 0615)    DOPamine Stopped (04/27/22 1300)    norepinephrine 20 mcg/min (04/28/22 0840)    heparin (PORCINE) Infusion 10 Units/kg/hr (04/28/22 0615)    sodium chloride      sodium chloride      dextrose       PRN Meds:atropine, heparin (porcine), heparin (porcine), dextrose, sodium chloride, sodium chloride flush, sodium chloride, ondansetron **OR** ondansetron, polyethylene glycol, acetaminophen **OR** acetaminophen, albuterol, albuterol sulfate HFA, hydrOXYzine, dicyclomine, glucose, glucagon (rDNA), dextrose    PHYSICAL EXAM:    CURRENT VITALS: BP (!) 97/58   Pulse 61   Temp 97.3 °F (36.3 °C) (Rectal)   Resp 12   Ht 5' 4\" (1.626 m)   Wt 245 lb 6 oz (111.3 kg)   SpO2 100%   BMI 42.12 kg/m²     CONSTITUTIONAL:  awake, alert, cooperative, no apparent distress,   ENT:  Normocephalic, without obvious abnormality, atraumatic, sinuses nontender on palpation, external ears without lesions,  NECK:  Supple, symmetrical, trachea midline, no adenopathy, thyroid symmetric, not enlarged and no tenderness, skin normal  LUNGS:  No increased work of breathing, good air exchange, clear to auscultation bilaterally, no crackles or wheezing  CARDIOVASCULAR:  Normal apical impulse, regular rate and rhythm, normal S1 and S2,  and no murmur noted  ABDOMEN:  Normal bowel sounds, soft, non-distended, non-tender, no masses palpated, no hepatosplenomegally  EXTREMITIES:  No edema, Pulses strong throughout. NEUROLOGIC:  Awake, alert, oriented to name, place and time.  Following all commands and moving all extremties  SKIN:  no bruising or bleeding, normal skin color, texture, turgor and no rashes     Data:       LABS:  Recent Results (from the past 24 hour(s))   Lactic Acid    Collection Time: 04/27/22 10:17 AM   Result Value Ref Range    Lactic Acid 1.0 0.5 - 2.2 mmol/L   Protime-INR    Collection Time: 04/27/22 10:17 AM   Result Value Ref Range    Protime 16.4 (H) 12.3 - 14.9 sec    INR 1.3    Culture, Blood 1    Collection Time: 04/27/22 10:30 AM    Specimen: Blood   Result Value Ref Range    Blood Culture, Routine (A)      Gram stain aerobic bottle  Gram positive cocci in clusters-resembling Staph  2 out of 2 blood cultures  Further testing performed at [facility] 15, Blood 2    Collection Time: 04/27/22 10:30 AM    Specimen: Blood   Result Value Ref Range    Culture, Blood 2 (A)      Gram stain aerobic bottle  Gram positive cocci in clusters-resembling Staph  2 out of 2 blood cultures  Further testing performed at [facility] 80     Blood ID, Molecular    Collection Time: 04/27/22 10:30 AM   Result Value Ref Range    Acinetobacter calcoac baumannii complex by PCR Not Detected Not Detected    Bacteroides fragilis by PCR Not Detected Not Detected    Enterobacteriaceae by PCR Not Detected Not Detected    Enterobacter cloacae complex by PCR Not Detected Not Detected    Enterococcus faecalis by PCR Not Detected Not Detected    Enterococcus faecium by PCR Not Detected Not Detected    Escherichia coli by PCR Not Detected Not Detected    Haemophilus Influenzae by PCR Not Detected Not Detected    Klebsiella aerogenes by PCR Not Detected Not Detected    Klebsiella oxytoca by PCR Not Detected Not Detected    Klebsiella pneumoniae group by PCR Not Detected Not Detected    Listeria monocytogenes by PCR Not Detected Not Detected    Neisseria meningitidis by PCR Not Detected Not Detected    Proteus species by PCR Not Detected Not Detected    Pseudomonas aeruginosa by PCR Not Detected Not Detected    Salmonella species by PCR Not Detected Not Detected    Streptococcus agalactiae by PCR Not Detected Not Detected    Staphylococcus aureus by PCR Not Detected Not Detected    Staphylococcus epidermidis by PCR DETECTED (A) Not Detected    Staphylococcus lugdunensis by PCR Not Detected Not Detected    Staphylococcus species by PCR DETECTED (A) Not Detected    Serratia marcescens by PCR Not Detected Not Detected    Streptococcus pneumoniae by PCR Not Detected Not Detected    Streptococcus pyogenes  by PCR Not Detected Not Detected    Streptococcus species by PCR Not Detected Not Detected    Stenotrophomonas maltophilia by PCR Not Detected Not Detected    Candida albicans by PCR Not Detected Not Detected    Candida auris by PCR Not Detected Not Detected    Candida glabrata by PCR Not Detected Not Detected    Candida krusei by PCR Not Detected Not Detected    Candida parapsilosis by PCR Not Detected Not Detected    Candida tropicalis by PCR Not Detected Not Detected    Cryptococcus neoformans/gattii by PCR Not Detected Not Detected Methicillin Resistance mecA/C  by PCR DETECTED (A) Not Detected   POCT Arterial    Collection Time: 04/27/22 10:37 AM   Result Value Ref Range    POC Sodium 135 (L) 136 - 145 mEq/L    POC Potassium 4.8 3.5 - 5.1 mEq/L    POC Chloride 107 99 - 110 mEq/L    POC Glucose 171 (H) 70 - 99 mg/dl    POC Creatinine 3.6 (H) 0.6 - 1.2 mg/dL    GFR Non-African American 13 (A) >60    GFR  15 (A) >60    Calcium, Ion 1.16 1.12 - 1.32 mmol/L    pH, Arterial 7.189 (LL) 7.350 - 7.450    pCO2, Arterial 52 (H) 35 - 45 mm Hg    pO2, Arterial 63 (HH) 75 - 108 mm Hg    HCO3, Arterial 19.7 (L) 21.0 - 29.0 mmol/L    Base Excess, Arterial -9 (L) -3 - 3    O2 Sat, Arterial 85 (LL) 93 - 100 %    TCO2, Arterial 21 21 - 32 mmol/L    Lactate 0.82 0.40 - 2.00 mmol/L    POC Hematocrit 27 (L) 36 - 48 %    Hemoglobin 9.0 (L) 12.0 - 16.0 gm/dL    FIO2 60.000     Sample Type ART     Performed on SEE BELOW    POCT Arterial    Collection Time: 04/27/22  1:50 PM   Result Value Ref Range    POC Sodium 134 (L) 136 - 145 mEq/L    POC Potassium 4.8 3.5 - 5.1 mEq/L    POC Chloride 105 99 - 110 mEq/L    POC Glucose 231 (H) 70 - 99 mg/dl    POC Creatinine 3.1 (H) 0.6 - 1.2 mg/dL    GFR Non-African American 15 (A) >60    GFR  18 (A) >60    Calcium, Ion 1.16 1.12 - 1.32 mmol/L    pH, Arterial 7.304 (L) 7.350 - 7.450    pCO2, Arterial 36 35 - 45 mm Hg    pO2, Arterial 112 (HH) 75 - 108 mm Hg    HCO3, Arterial 17.7 (L) 21.0 - 29.0 mmol/L    Base Excess, Arterial -9 (L) -3 - 3    O2 Sat, Arterial 98 (HH) 93 - 100 %    TCO2, Arterial 19 (L) 21 - 32 mmol/L    Lactate 1.18 0.40 - 2.00 mmol/L    POC Hematocrit 29 (L) 36 - 48 %    Hemoglobin 9.8 (L) 12.0 - 16.0 gm/dL    FIO2 60.000     Sample Type ART     Performed on SEE BELOW    Troponin    Collection Time: 04/27/22  1:54 PM   Result Value Ref Range    Troponin 0.222 (HH) 0.000 - 0.010 ng/mL   POCT Glucose    Collection Time: 04/27/22  1:56 PM   Result Value Ref Range    POC Glucose 264 (H) 70 - 99 mg/dl    Performed on ACCU-CHEK    CBC with Auto Differential    Collection Time: 04/27/22  1:57 PM   Result Value Ref Range    WBC 15.0 (H) 4.8 - 10.8 K/uL    RBC 3.68 (L) 4.20 - 5.40 M/uL    Hemoglobin 9.1 (L) 12.0 - 16.0 g/dL    Hematocrit 27.3 (L) 37.0 - 47.0 %    MCV 74.1 (L) 82.0 - 100.0 fL    MCH 24.7 (L) 27.0 - 31.3 pg    MCHC 33.4 33.0 - 37.0 %    RDW 19.3 (H) 11.5 - 14.5 %    Platelets 112 608 - 252 K/uL    Neutrophils % 88.3 %    Lymphocytes % 5.1 %    Monocytes % 6.0 %    Eosinophils % 0.2 %    Basophils % 0.4 %    Neutrophils Absolute 13.3 (H) 1.4 - 6.5 K/uL    Lymphocytes Absolute 0.8 (L) 1.0 - 4.8 K/uL    Monocytes Absolute 0.9 (H) 0.2 - 0.8 K/uL    Eosinophils Absolute 0.0 0.0 - 0.7 K/uL    Basophils Absolute 0.1 0.0 - 0.2 K/uL   POCT Glucose    Collection Time: 04/27/22  5:23 PM   Result Value Ref Range    POC Glucose 216 (H) 70 - 99 mg/dl    Performed on ACCU-CHEK    Troponin    Collection Time: 04/27/22  8:00 PM   Result Value Ref Range    Troponin 0.372 (HH) 0.000 - 0.010 ng/mL   APTT    Collection Time: 04/27/22  8:34 PM   Result Value Ref Range    aPTT 149.9 (H) 24.4 - 36.8 sec   Protime-INR    Collection Time: 04/27/22  8:34 PM   Result Value Ref Range    Protime 17.3 (H) 12.3 - 14.9 sec    INR 1.4    Anti-Xa, Unfractionated Heparin    Collection Time: 04/27/22  8:34 PM   Result Value Ref Range    Anti-XA Unfrac Heparin 0.63 IU/mL   POCT Glucose    Collection Time: 04/27/22  9:24 PM   Result Value Ref Range    POC Glucose 232 (H) 70 - 99 mg/dl    Performed on ACCU-CHEK    Basic Metabolic Panel    Collection Time: 04/27/22 10:21 PM   Result Value Ref Range    Sodium 132 (L) 135 - 144 mEq/L    Potassium 4.0 3.4 - 4.9 mEq/L    Chloride 103 95 - 107 mEq/L    CO2 20 20 - 31 mEq/L    Anion Gap 9 9 - 15 mEq/L    Glucose 204 (H) 70 - 99 mg/dL    BUN 51 (H) 8 - 23 mg/dL    CREATININE 2.17 (H) 0.50 - 0.90 mg/dL    GFR Non-African American 22.8 (L) >60    GFR  27.6 (L) >60 Calcium 8.4 (L) 8.5 - 9.9 mg/dL   POCT Glucose    Collection Time: 04/28/22  1:28 AM   Result Value Ref Range    POC Glucose 191 (H) 70 - 99 mg/dl    Performed on ACCU-CHEK    EKG 12 Lead    Collection Time: 04/28/22  2:09 AM   Result Value Ref Range    Ventricular Rate 59 BPM    Atrial Rate 59 BPM    P-R Interval 214 ms    QRS Duration 124 ms    Q-T Interval 450 ms    QTc Calculation (Bazett) 445 ms    P Axis 30 degrees    R Axis 35 degrees    T Axis 226 degrees   Anti-Xa, Unfractionated Heparin    Collection Time: 04/28/22  3:03 AM   Result Value Ref Range    Anti-XA Unfrac Heparin 0.92 IU/mL   POCT Arterial    Collection Time: 04/28/22  4:50 AM   Result Value Ref Range    POC Sodium 141 136 - 145 mEq/L    POC Potassium 3.5 3.5 - 5.1 mEq/L    POC Chloride 107 99 - 110 mEq/L    POC Glucose 118 (H) 70 - 99 mg/dl    POC Creatinine 1.9 (H) 0.6 - 1.2 mg/dL    GFR Non-African American 27 (A) >60    GFR  32 (A) >60    Calcium, Ion 1.20 1. 12 - 1.32 mmol/L    pH, Arterial 7.448 7.350 - 7.450    pCO2, Arterial 29 (L) 35 - 45 mm Hg    pO2, Arterial 118 (HH) 75 - 108 mm Hg    HCO3, Arterial 20.3 (L) 21.0 - 29.0 mmol/L    Base Excess, Arterial -4 (L) -3 - 3    O2 Sat, Arterial 99 (HH) 93 - 100 %    TCO2, Arterial 21 21 - 32 mmol/L    Lactate 0.88 0.40 - 2.00 mmol/L    POC Hematocrit 23 (L) 36 - 48 %    Hemoglobin 7.8 (L) 12.0 - 16.0 gm/dL    FIO2 50.000     Sample Type ART     Performed on SEE BELOW    POCT Glucose    Collection Time: 04/28/22  5:40 AM   Result Value Ref Range    POC Glucose 129 (H) 70 - 99 mg/dl    Performed on ACCU-CHEK    Troponin    Collection Time: 04/28/22  5:57 AM   Result Value Ref Range    Troponin 0.893 (HH) 0.000 - 0.010 ng/mL   Comprehensive Metabolic Panel    Collection Time: 04/28/22  5:57 AM   Result Value Ref Range    Sodium 136 135 - 144 mEq/L    Potassium 3.5 3.4 - 4.9 mEq/L    Chloride 106 95 - 107 mEq/L    CO2 20 20 - 31 mEq/L    Anion Gap 10 9 - 15 mEq/L    Glucose 121 (H) 70 - 99 mg/dL    BUN 45 (H) 8 - 23 mg/dL    CREATININE 1.75 (H) 0.50 - 0.90 mg/dL    GFR Non-African American 29.2 (L) >60    GFR  35.4 (L) >60    Calcium 7.8 (L) 8.5 - 9.9 mg/dL    Total Protein 5.5 (L) 6.3 - 8.0 g/dL    Albumin 2.7 (L) 3.5 - 4.6 g/dL    Total Bilirubin 0.4 0.2 - 0.7 mg/dL    Alkaline Phosphatase 75 40 - 130 U/L    ALT 15 0 - 33 U/L    AST 58 (H) 0 - 35 U/L    Globulin 2.8 2.3 - 3.5 g/dL   Magnesium    Collection Time: 04/28/22  5:57 AM   Result Value Ref Range    Magnesium 2.2 1.7 - 2.4 mg/dL   Renal Function Panel    Collection Time: 04/28/22  5:57 AM   Result Value Ref Range    Phosphorus 2.2 (L) 2.3 - 4.8 mg/dL   CBC with Auto Differential    Collection Time: 04/28/22  5:58 AM   Result Value Ref Range    WBC 8.5 4.8 - 10.8 K/uL    RBC 3.09 (L) 4.20 - 5.40 M/uL    Hemoglobin 7.7 (L) 12.0 - 16.0 g/dL    Hematocrit 23.1 (L) 37.0 - 47.0 %    MCV 74.7 (L) 82.0 - 100.0 fL    MCH 25.0 (L) 27.0 - 31.3 pg    MCHC 33.5 33.0 - 37.0 %    RDW 19.0 (H) 11.5 - 14.5 %    Platelets 924 287 - 828 K/uL    PLATELET SLIDE REVIEW Normal     Neutrophils % 80.0 %    Lymphocytes % 14.0 %    Monocytes % 4.8 %    Eosinophils % 1.0 %    Basophils % 0.2 %    Neutrophils Absolute 6.8 (H) 1.4 - 6.5 K/uL    Lymphocytes Absolute 1.2 1.0 - 4.8 K/uL    Monocytes Absolute 0.4 0.2 - 0.8 K/uL    Eosinophils Absolute 0.1 0.0 - 0.7 K/uL    Basophils Absolute 0.0 0.0 - 0.2 K/uL    nRBC 4 /100 WBC    Anisocytosis 3+     Microcytes 1+     Poikilocytes 2+     Ovalocytes Occasional     Target Cells 1+     Tear Drop Cells Occasional             EKG:    Sinus bradycardia with 1st degree AV block   Nonspecific intraventricular conduction delay   ST & T wave abnormality, consider inferior ischemia   ST & T wave abnormality, consider anterolateral ischemia   Abnormal ECG     ECHO:   Left ventricular ejection fraction is visually estimated at 45-50%.    Near akinesis of septum, hypokinesis of septal aspect of apex and distal  most anteroseptal wall.    Overall LVEF seems a bit better than 8/2021    Right ventricle global systolic function is mildly reduced .    Moderately enlarged right ventricle cavity.    Right ventricular systolic pressure of 47 mm Hg consistent with moderate    pulmonary hypertension.    8/2021 RVSP was 60 mm Hg    Mildly dilated left atrium.    Markedly enlarged right atrium size.    Normal mitral valve structure    3+ MR    Normal aortic valve structure and function.    Normal tricuspid valve structure    Severe tricuspid regurgitation.              Micki Nunez MD ,[facility]

## 2022-04-28 NOTE — PROGRESS NOTES
Physician Progress Note      PATIENT:               Corinne Catherine  [facility] #:                  [id number]  :                       1955  ADMIT DATE:       2022 9:11 PM  [address] [place] DATE:  RESPONDING  PROVIDER #:        Mayra Dooley MD        QUERY TEXT:    Type of Encephalopathy: Please provide further specificity, if known. Clinical indicators include: acute, encephalopathy, ckd, chronic kidney   disease, alcohol, hypoglycemia, septic, hyponatremia, infection, kd, fever,   alcohol use, septicemia  Options provided:  -- Anoxic/hypoxic encephalopathy  -- Metabolic encephalopathy  -- Toxic encephalopathy  -- Hepatic encephalopathy  -- Hypertensive encephalopathy  -- Other - I will add my own diagnosis  -- Disagree - Not applicable / Not valid  -- Disagree - Clinically Unable to determine / Unknown        PROVIDER RESPONSE TEXT:    Provider was unable to determine a response for this query.       Electronically signed by:  Mayra Dooley MD 2022 1:41 PM

## 2022-04-29 PROBLEM — N18.9 ACUTE KIDNEY INJURY SUPERIMPOSED ON CKD (HCC): Status: ACTIVE | Noted: 2020-04-26

## 2022-04-29 NOTE — PROGRESS NOTES
Pulmonary & Critical Care Medicine ICU Progress Note  Chief complaint : Acute respiratory failure    Subjunctive/24 hour events :   Patient seen and examined during multidisciplinary rounds with RN, charge nurse, RT, pharmacy, dietitian, and social service. Patient was extubated yesterday, currently on 3 L nasal cannula sats are 100%. Patient remains on a heparin drip. No fever overnight, urine output 2000 for 24 hours. Last BM 4/22/2022 per notes. Social History     Tobacco Use    Smoking status: Never Smoker    Smokeless tobacco: Never Used   Substance Use Topics    Alcohol use: Never     History reviewed. No pertinent family history. Recent Labs     04/28/22  0450 04/28/22  1045   PHART 7.448 7.322*   AYZ9ADI 29* 41   PO2ART 118* 117*       MV Settings:  Vent Mode: CPAP Resp Rate (Set): 32 bmp/Vt (Set, mL): 330 mL/ /FiO2 : 50 %           IV:   sodium chloride      sodium chloride      heparin (PORCINE) Infusion 7.5 Units/kg/hr (04/29/22 0647)    sodium chloride      sodium chloride      dextrose         Vitals:  BP (!) 128/109   Pulse 78   Temp 98.8 °F (37.1 °C) (Rectal)   Resp 11   Ht 5' 4\" (1.626 m)   Wt 257 lb 15 oz (117 kg)   SpO2 100%   BMI 44.27 kg/m²    Tmax:       Intake/Output Summary (Last 24 hours) at 4/29/2022 1252  Last data filed at 4/29/2022 6240  Gross per 24 hour   Intake 3777.81 ml   Output 2110 ml   Net 1667.81 ml       EXAM:    General: alert, cooperative  Head: normocephalic, atraumatic  Eyes:No gross abnormalities. ENT:  MMM no lesions  Neck:  supple and no masses  Chest : Good air movement no rales no wheezing  Heart[de-identified] Heart sounds are normal.  Regular rate and rhythm without murmur, gallop or rub. ABD:  bowel sounds normal, soft, non-tender  Musculoskeletal : no cyanosis, no clubbing and trace edema  Neuro:  Grossly normal  Skin: No rashes or nodules noted.   Lymph node:  no cervical nodes  Urology: Yes Colon   Psychiatric: appropriate    Medications:  Scheduled Meds:   furosemide  40 mg IntraVENous BID    potassium chloride  20 mEq Oral BID WC    lidocaine  5 mL IntraDERmal Once    sodium chloride flush  5-40 mL IntraVENous 2 times per day    vancomycin  750 mg IntraVENous Q24H    prazosin  1 mg Oral Nightly    fentanNYL  50 mcg IntraVENous Once    rocuronium  1 mg/kg IntraVENous Once    pantoprazole (PROTONIX) 40 mg injection  40 mg IntraVENous Daily    piperacillin-tazobactam  3,375 mg IntraVENous Q8H    sennosides-docusate sodium  2 tablet Oral BID    insulin lispro  0-6 Units SubCUTAneous 4 times per day    vancomycin (VANCOCIN) intermittent dosing (placeholder)   Other RX Placeholder    dextrose bolus (hypoglycemia)  250 mL IntraVENous Once    sodium chloride flush  5-40 mL IntraVENous 2 times per day    sertraline  200 mg Oral Daily    montelukast  10 mg Oral Nightly    [Held by provider] metoprolol succinate  50 mg Oral BID    levothyroxine  50 mcg Oral Daily    [Held by provider] isosorbide dinitrate  20 mg Oral TID    [Held by provider] haloperidol  5 mg Oral Daily    [Held by provider] doxepin  150 mg Oral Nightly    [Held by provider] busPIRone  10 mg Oral BID    [Held by provider] atorvastatin  80 mg Oral Nightly    polyvinyl alcohol  1 drop Both Eyes BID    [Held by provider] ranolazine  1,000 mg Oral BID    [Held by provider] meclizine  25 mg Oral BID    [Held by provider] hydrALAZINE  50 mg Oral 3 times per day    insulin glargine  40 Units SubCUTAneous Nightly       PRN Meds:  sodium chloride flush, sodium chloride, atropine, heparin (porcine), heparin (porcine), dextrose, sodium chloride, sodium chloride flush, sodium chloride, ondansetron **OR** ondansetron, polyethylene glycol, acetaminophen **OR** acetaminophen, albuterol, albuterol sulfate HFA, hydrOXYzine, dicyclomine, glucose, glucagon (rDNA), dextrose    Results: reviewed by me   CBC:   Recent Labs     04/27/22  1357 04/28/22  6162 04/28/22  0558 04/28/22  1045 04/29/22  0600   WBC 15.0*  --  8.5  --  10.4   HGB 9.1*   < > 7.7* 9.7* 8.5*   HCT 27.3*  --  23.1*  --  25.2*   MCV 74.1*  --  74.7*  --  74.2*     --  136  --  174    < > = values in this interval not displayed. BMP:   Recent Labs     04/27/22  2221 04/28/22  0450 04/28/22  0557 04/28/22  1045 04/29/22  0600   *  --  136  --  138   K 4.0  --  3.5  --  3.9  3.9     --  106  --  106   CO2 20  --  20  --  22   PHOS  --   --  2.2*  --  2.6   BUN 51*  --  45*  --  34*   CREATININE 2.17*   < > 1.75* 1.7* 1.29*    < > = values in this interval not displayed. LIVER PROFILE:   Recent Labs     04/27/22  0600 04/28/22  0557 04/29/22  0600   AST 52* 58* 53*   ALT 17 15 18   BILITOT 0.5 0.4 0.5   ALKPHOS 84 75 88     PT/INR:   Recent Labs     04/27/22  1017 04/27/22 2034   PROTIME 16.4* 17.3*   INR 1.3 1.4     APTT:   Recent Labs     04/27/22 2034   APTT 149.9*     UA:No results for input(s): NITRITE, COLORU, PHUR, LABCAST, WBCUA, RBCUA, MUCUS, TRICHOMONAS, YEAST, BACTERIA, CLARITYU, SPECGRAV, LEUKOCYTESUR, UROBILINOGEN, BILIRUBINUR, BLOODU, GLUCOSEU, AMORPHOUS in the last 72 hours.     Invalid input(s): KETONESU    Cultures:    ECHO Complete 2D W Doppler W Color    Result Date: 4/27/2022  Transthoracic Echocardiography Report (TTE)  Demographics   Patient Name   Aliyah Romero       Gender               Female                 Dona Agrawal   Patient Number [id number]            Race                 Other                                      Ethnicity             or    Visit Number   [id number]           Room Number          IC07   Corporate ID                       Date of Study        04/27/2022   Accession      [id number]          Referring Physician  Number   Date of Birth  1955          Sonographer          Cecily Matson   Age            61 year(s)          Interpreting         [id number]                                     Physician            Malen Curling MD Procedure Type of Study   TTE procedure:ECHO COMPLETE 2D W/DOP W/COLOR. Procedure Date Date: 04/27/2022 Start: 09:55 AM Study Location: Portable Technical Quality: Adequate visualization Indications:Congestive heart failure. Patient Status: Routine Height: 64 inches Weight: 242 pounds BSA: 2.12 m^2 BMI: 41.54 kg/m^2 BP: 104/46 mmHg  Conclusions   Summary  Left ventricular ejection fraction is visually estimated at 45-50%. Near akinesis of septum, hypokinesis of septal aspect of apex and distal  most anteroseptal wall. Overall LVEF seems a bit better than 8/2021  Right ventricle global systolic function is mildly reduced . Moderately enlarged right ventricle cavity. Right ventricular systolic pressure of 47 mm Hg consistent with moderate  pulmonary hypertension. 8/2021 RVSP was 60 mm Hg  Mildly dilated left atrium. Markedly enlarged right atrium size. Normal mitral valve structure  3+ MR  Normal aortic valve structure and function. Normal tricuspid valve structure  Severe tricuspid regurgitation. Signature   ----------------------------------------------------------------  Electronically signed by Lexi Mckee MD(Interpreting  physician) on 04/27/2022 05:08 PM  ----------------------------------------------------------------   Findings  Left Ventricle Left ventricular ejection fraction is visually estimated at 45-50%. Near akinesis of septum, hypokinesis of septal aspect of apex and distal most anteroseptal wall. Overall LVEF seems a bit better than 8/2021 Right Ventricle Right ventricle global systolic function is mildly reduced . Moderately enlarged right ventricle cavity. Right ventricular systolic pressure of 47 mm Hg consistent with moderate pulmonary hypertension. 8/2021 RVSP was 60 mm Hg Left Atrium Mildly dilated left atrium. Right Atrium Markedly enlarged right atrium size.  Mitral Valve Normal mitral valve structure 3+ MR Tricuspid Valve Normal tricuspid valve structure Severe tricuspid regurgitation. Aortic Valve Normal aortic valve structure and function. Pulmonic Valve The pulmonic valve was not well visualized . Pericardial Effusion No evidence of pericardial effusion. Pleural Effusion No evidence of pleural effusion. Aorta \ Miscellaneous Aortic root dimension within normal limits. M-Mode Measurements (cm)   LVIDd: 4.63 cm                         LVIDs: 3.58 cm  IVSd: 1.18 cm                          IVSs: 1.45 cm  LVPWd: 1.42 cm                         AO Root Dimension: 2.54 cm  Rt. Vent.  Dimension: 3.32 cm                                         LVOT: 1.98 cm  Doppler Measurements:   TR Velocity:3.04 m/s  TR Gradient:37.03 mmHg                                     Estimated RAP:10 mmHg                                     RVSP:47.03 mmHg  Valves  Tricuspid Valve   Estimated RVSP: 47.03 mmHg              Estimated RAP: 10 mmHg  TR Velocity: 3.04 m/s                   TR Gradient: 37.03 mmHg   Pulmonic Valve            Estimated PASP: 47.03 mmHg   LVOT   LVOT Diameter: 1.98 cm  Structures  Left Ventricle   Diastolic Dimension: 1.90 cm         Systolic Dimension: 9.97 cm  Septum Diastolic: 9.17 cm            Septum Systolic: 1.40 cm  PW Diastolic: 9.99 cm                                       FS: 22.7 %  LV EDV/LV EDV Index: 98.85 ml/47 m^2 LV ESV/LV ESV Index: 53.69 ml/25 m^2  EF Calculated: 45.7 %   LVOT Diameter: 1.98 cm   Right Atrium   RA Systolic Pressure: 10 mmHg   Right Ventricle   Diastolic Dimension: 6.50 cm                                    RV Systolic Pressure: 17.36 mmHg  Aorta/ Miscellaneous Aorta   Aortic Root: 2.54 cm  LVOT Diameter: 1.98 cm      CT ABDOMEN PELVIS WO CONTRAST Additional Contrast? None    Result Date: 4/23/2022  EXAM:  CT ABDOMEN PELVIS WO CONTRAST History: Abdominal pain Technique: Multiple contiguous axial images were obtained of the abdomen and pelvis from the level of the lung bases through the ischial tuberosities without contrast. Multiplanar reformats were obtained. Comparison: CT abdomen pelvis April 20, 2022 Findings: Lung bases are clear. Heart size is enlarged. Mitral annular calcification. Evidence of prior thoracic surgery. Lack of intravenous contrast precludes optimal evaluation of the abdominal and pelvic viscera. The unenhanced liver, spleen, stomach, pancreas, and adrenal glands appear within normal limits. The gallbladder is mildly distended but not significant changed from recent CT. There are debris is present within the gallbladder lumen. No gallbladder wall thickening or pericholecystic fluid identified by CT. Mild bilateral perinephric stranding. No urinary tract calculi or hydronephrosis. The urinary bladder is decompressed. The uterus is absent. Abdominal aorta is nonaneurysmal  . No retroperitoneal or abdominal/pelvic lymphadenopathy. No small bowel obstruction. No overt colonic mass or pericolonic inflammation. No findings of acute appendicitis No free fluid, loculated fluid collection, or pneumoperitoneum. No acute osseous abnormality. Mildly distended gallbladder containing gallbladder sludge and/or tiny gallstones without significant interval change since April 20, 2022 CT. No CT findings of acute cholecystitis. Mild bilateral perinephric stranding is nonspecific. Correlation for polynephritis is recommended. All CT scans at this facility use dose modulation, iterative reconstruction, and/or weight based dosing when appropriate to reduce radiation dose to as low as reasonably achievable. XR CHEST (2 VW)    Result Date: 4/28/2022  EXAMINATION: Chest x-ray, 2 view HISTORY: Acute respiratory failure TECHNIQUE: Frontal and lateral views of the chest COMPARISON: Portable chest radiograph April 27, 2022 FINDINGS: Interval removal of the endotracheal tube and enteric tube. Right and left jugular catheters remain. Heart size is enlarged. Interval development of right midlung and right lung base opacities.  No significant interval change of left lung opacities. No pneumothorax. No acute osseous abnormality. Interval development of right lung infiltrate and/or atelectasis. Otherwise no significant interval change. CT HEAD WO CONTRAST    Result Date: 4/27/2022  CT Brain. Contrast medium:  without contrast.. History:  Nonresponsive. Technical factors: CT imaging of the brain was obtained and formatted as 5 mm contiguous axial images. 2.5 mm contiguous axial images were obtained through the osseous structures. Sagittal and coronal reconstruction obtained during postprocessing. Comparison:  CT brain May 6, 2020, April 21, 2020. MRI brain May 7, 2020. Findings: Extra-axial spaces:  Normal. Intracranial hemorrhage:  None. Ventricular system: [Negative. Basal Cisterns:  Without anomaly. Cerebral Parenchyma: Without anomaly. Midline Shift:  None. Cerebellum:  No anomaly identified. Paranasal sinuses and mastoid air cells:  Small air-fluid levels, bilateral maxillary sinuses, left sphenoid sinus. Partial opacification ethmoid sinuses. Visualized Orbits:  Negative. Impression: Pansinusitis. All CT scans at this facility use dose modulation, iterative reconstruction, and/or weight based dosing when appropriate to reduce radiation dose to as low as reasonably achievable. NM LUNG SCAN PERFUSION ONLY    Result Date: 4/28/2022  EXAMINATION: PERFUSION ONLY SCINTIGRAPHY CLINICAL HISTORY: 74-year-old with new onset acidosis, worsening renal failure and cardiac disease. Patient has been intubated. TECHNIQUE: 5.1 mCi of technetium labeled MAA were utilized for pulmonary perfusion only scintigraphy. Planar images of the chest were obtained in the anterior, posterior, lateral, and oblique planes. Comparison made with a AP chest x-ray from 4/28/2022 which demonstrates bilateral pulmonary opacities in both mid and lower lung zones. FINDINGS: Perfusion images are abnormal. There are perfusion defects involving the lingula and basilar segments of the left lower lobe. There are also perfusion defects involving much of the right lower lobe. No ventilation images to assess for a matching defects. ABNORMAL PERFUSION SCINTIGRAPHY WITH BILATERAL PERFUSION DEFECTS. THEREFORE, THE STUDY IS INDETERMINATE FOR ASSESSMENT OF ACUTE PULMONARY EMBOLUS    CT ABDOMEN PELVIS W IV CONTRAST Additional Contrast? None    Result Date: 4/20/2022  EXAMINATION: CT ABDOMEN PELVIS W IV CONTRAST DATE AND TIME:4/20/2022 7:19 AM CLINICAL HISTORY: Acute abdominal pain. Epigastric pain. abd and chest pain x 2 days  COMPARISON: August 15, 2021 TECHNIQUE: Contiguous axial CT sections of the abdomen and pelvis. 100 cc's of IV contrast given. .  All CT scans at this facility use dose modulation, iterative reconstruction, and/or weight based dosing when appropriate to reduce radiation dose to as low as reasonably achievable. Some of this report was completed using  [email or web address] and may include unintended errors with respect to translation of words, typographical errors or grammatical errors which may not have been identified prior to the finalization of this report. FINDINGS: Incidental gallstones again noted without secondary signs of acute gallbladder disease. Hepatic steatosis. Spleen pancreas and kidneys are negative. No diverticulitis or colitis. No bowel obstruction. No aneurysm. Bones intact. IMPRESSION: NO ACUTE PATHOLOGY. XR CHEST PORTABLE    Result Date: 4/27/2022  EXAMINATION: XR CHEST PORTABLE CLINICAL HISTORY: RIGHT LINE PLACED COMPARISONS: APRIL 27, 2022, 0935 HOURS, APRIL 20, 2022 FINDINGS: Tip of endotracheal tube 4.2 cm superior to denise. Nasogastric tube courses into stomach. Tip right jugular vein central line at cephalad margin superior vena cava. Tip of left jugular vein central line in left brachiocephalic vein. Median sternotomy. Cardiopericardial silhouette enlarged. Ill-defined area of increased opacity left mid left lower lung.  Right lung clear.     LEFT MID/LOWER LUNG ATELECTASIS/PNEUMONIA. XR CHEST PORTABLE    Result Date: 4/27/2022  EXAMINATION: Portable AP ERECT view of the chest. CLINICAL HISTORY:  ETT and Central line placement DATE: 4/27/2022 9:37 AM COMPARISONS: 4/20/2022 FINDINGS: Film(s) was obtained with a poor depth of inspiration. The heart is moderately enlarged, unchanged. There are mitral valve annular calcifications, seen previously. There are multiple sternal sutures and mediastinal clips present. The end of endotracheal tube lies 3.5 cm above the denise. The gastric catheter extends into the stomach. There are atherosclerotic calcifications in the aortic arch. This mild degree of Central vascular congestion. Small infiltrate/atelectasis in lung bases, left greater than right. No pleural effusion or pneumothorax. There are mild degenerative changes in spine. MODERATE CARDIAC ENLARGEMENT UNCHANGED. MILD CENTRAL VASCULAR CONGESTION. BIBASILAR INFILTRATES, LEFT GREATER THAN RIGHT. XR CHEST PORTABLE    Result Date: 4/20/2022  EXAMINATION: XR CHEST PORTABLE CLINICAL HISTORY: CHEST AND ABDOMINAL PAIN FOR 2 DAYS COMPARISONS: CHEST RADIOGRAPH NOVEMBER 16, 2021, CT CHEST NOVEMBER 13, 2021 FINDINGS: Median sternotomy. Cardiopericardial silhouette upper limits normal, unchanged. Ill-defined areas increase opacity lower lung zones bilaterally. BILATERAL LOWER LUNG ATELECTASIS/PNEUMONIA    US ABDOMEN LIMITED    Result Date: 4/23/2022  EXAM: US ABDOMEN LIMITED HISTORY: Right upper quadrant pain TECHNIQUE: Ultrasound evaluation was performed of the right upper quadrant of the abdomen. COMPARISON: CT abdomen pelvis April 23, 2022 FINDINGS: Normal echogenicity and contour of the liver. No liver lesion or intrahepatic biliary dilatation. Main portal vein is patent. The gallbladder is mildly distended. Layering echogenic material is identified within the lumen of the gallbladder. No pericholecystic fluid.  Gallbladder wall thickness is at the upper limits of normal measuring 2.7 mm. Negative Velez sign reported. Common bile duct is normal in diameter measuring 4 mm. No overt abnormality of the pancreas. Nonspecific findings of the gallbladder including mildly distended gallbladder containing layering sludge and/or tiny gallstones with wall thickness of the upper limits of normal measuring 2.7 mm. No pericholecystic fluid to suggest acute cholecystitis. FL CHOLANGIOGRAM OR    Result Date: 4/25/2022  EXAMINATION: FL CHOLANGIOGRAM OR CLINICAL HISTORY:  pain COMPARISONS: None available. FINDINGS: Fluoroscopic assistance was provided during intraoperative cholangiogram. There is contrast opacification of the intrahepatic bile ducts, common hepatic duct, the distal cystic duct, and common bile duct. There is spillage of contrast into the duodenum. There are no persistent filling defects. Number of images: 183 The total radiation time is 12.3 seconds Radiation Dose is 2.71 rad. Fluoroscopic assistance was provided during intraoperative cholangiogram. Please refer to operative report. US DUP LOWER EXTREMITIES BILATERAL VENOUS    Result Date: 4/27/2022  EXAMINATION: US DUP LOWER EXTREMITIES BILATERAL VENOUS CLINICAL HISTORY: Shortness of breath. Bilateral leg pain and swelling. COMPARISON: None TECHNIQUE: The bilateral lower extremity veins were evaluated with color doppler, gray scale imaging and spectral analysis while using compression and augmentation when possible. RESULT: Evaluation of the bilateral lower extremity veins from the thigh to the knee shows normal phasic flow, normal augmentation of the Doppler signal, and normal compression of the deep veins. There is no sonographic evidence for acute deep venous thrombosis from the bilateral groin to the popliteal region. There is no sonographic evidence for acute deep venous thrombosis in the bilateral segmentally visualized calf veins.      No acute DVT of the bilateral lower extremity veins from the groin to the knee. There is no sonographic evidence for acute deep venous thrombosis in the bilateral segmentally visualized calf veins. Most recent    Chest CT      WITH CONTRAST:No results found for this or any previous visit. WITHOUT CONTRAST: No results found for this or any previous visit. CXR      2-view: Results for orders placed during the hospital encounter of 04/22/22    XR CHEST (2 VW)    Narrative  EXAMINATION: Chest x-ray, 2 view    HISTORY: Acute respiratory failure    TECHNIQUE: Frontal and lateral views of the chest    COMPARISON: Portable chest radiograph April 27, 2022    FINDINGS:    Interval removal of the endotracheal tube and enteric tube. Right and left jugular catheters remain. Heart size is enlarged. Interval development of right midlung and right lung base opacities. No significant interval change of left lung opacities. No  pneumothorax. No acute osseous abnormality. Impression  Interval development of right lung infiltrate and/or atelectasis. Otherwise no significant interval change. Results for orders placed during the hospital encounter of 11/13/21    XR CHEST (2 VW)    Narrative  XR CHEST (2 VW): 11/16/2021 8:38 AM    CLINICAL HISTORY:  chf sob . COMPARISON: None available. TECHNIQUE: A portable upright AP radiograph of the chest was obtained. FINDINGS:  Status post median sternotomy. The cardiac silhouette is at the upper limits of normal which may be secondary to cardiomegaly versus pericardial effusion. The aorta is ectatic which most commonly correlates with chronic hypertension. There are diffuse increased interstitial  pulmonary markings throughout both lungs which may be secondary to pulmonary edema. Impression  The findings most likely reflect chronic congestive heart failure, CHF. Kayla Cervantes        Portable: Results for orders placed during the hospital encounter of 04/22/22    XR CHEST PORTABLE    Narrative  EXAMINATION: XR CHEST PORTABLE    CLINICAL HISTORY: RIGHT LINE PLACED    COMPARISONS: APRIL 27, 2022, 0935 HOURS, APRIL 20, 2022    FINDINGS: Tip of endotracheal tube 4.2 cm superior to denise. Nasogastric tube courses into stomach. Tip right jugular vein central line at cephalad margin superior vena cava. Tip of left jugular vein central line in left brachiocephalic vein. Median  sternotomy. Cardiopericardial silhouette enlarged. Ill-defined area of increased opacity left mid left lower lung. Right lung clear. Impression  LEFT MID/LOWER LUNG ATELECTASIS/PNEUMONIA. Echo No results found for this or any previous visit. Assessment: This is a critically ill patient at risk of deterioration / death , needing close ICU monitoring and intervention due to below noted problems   · Acute hypoxic and hypercapnic respiratory failure  · Septic shock, remains off pressors  · Gram-positive septicemia  · Possible PE, VQ scan indeterminate and unable to do a CTA at this time  · Acute encephalopathy, improved  · Pulmonary hypertension group 2 and 3, RVSP 47 on recent echo, previously was 60  · History of JESICA, compliant with CPAP  · CAD, history of CABG June 2019    Recommendations  · CPAP at night and as needed during the day  · Maintain blood sugar 140-180  · Continue Lasix  · DVT prophylaxis, continue heparin drip  · PUD prophylaxis  · Continue antibiotics per ID  · PICC line today, remove central line once PICC is placed  · Bedside swallow eval, start oral diet  · PT OT        Due to the immediate potential for life-threatening deterioration due to acute respiratory failure , I spent 15  minutes providing critical care. This time is excluding time spent performing procedures.           Electronically signed by ESDRAS Hollins CNP,  F[facility]P ,on 4/29/2022 at 12:52 PM

## 2022-04-29 NOTE — CARE COORDINATION
IV BENEFIT REQUEST FORM    [facility]                        [address] [place]    REQUESTED BY: Electronically signed by Aisha Liu RN on 2022 at 1:13 PM                                               RN/C3: PHONE: [phone]    DATE:/TIME OF REQUEST: 22  TIME: 1:13 PM      TO: [address] [place] [address]      FAX TO: [phone]    PHONE: [phone]     THIS PATIENT HAS BEEN IDENTIFIED TO POSSIBLY NEED LONG TERM IV'S. PLEASE CHECK INSURANCE COVERAGE FOR THE FOLLOWING PT/DRUGS. PATIENT'S NAME: Bob QUEZADA                              [facility]   PATIENT'S : 1955  PATIENT ADDRESS: [facility]      PAYOR NAME:  Payor: Kat Reilly / Plan: [facility] / Product Type: *No Product type* /     DRUG: [employer]               VEHM:7.628BC        FREQUENCY: Q8HR    DRUG: VANCO                DOSE:750MG     FREQUENCY: Q 24HR      *IF [address] [place] [address] IS NOT A PROVIDER FOR THIS PATIENT, PLEASE FORWARD INFO VIA FAX TO [facility] @ [phone],(PHONE NUMBER: [phone]) TO RUN BENEFIT VERIFICATION AND NOTIFY THE ABOVE C3 OF THIS PLAN. (FAX FACE SHEET WITH DEMOGRAPHICS AND INSURANCE INFO WITH THIS FORM.)  PLEASE FAX BENEFIT INFO TO: THE [phone]    This message is intended only for the use of the individual or entity to which it is addressed and may contain information that is privileged, confidential, and exempt from disclosure under applicable law. If the reader of the notice is not intended recipient of the employee/agent responsible for delivering the message to the intended recipient, you are hereby notified than any dissemination, distribution or copying of this communication is strictly prohibited. Please contact the sender for further instructions on handling the information.

## 2022-04-29 NOTE — CONSULTS
Infectious Diseases Inpatient Consult Note      Reason for Consult:   Antibiotics management for bacteremia  Primary Care Physician:  Anthony Lundy  History Obtained From:   Pt, EPIC    Admit Date: 2022  Hospital Day: 8      HISTORY OF PRESENT ILLNESS:  This is a 61 y.o. female was admitted to [facility]  from home through ER on  with epigastric pain of 2 days duration, was found to have acute cholecystitis for which she underwent laparoscopic cholecystectomy on . Patient was transferred to ICU because of altered mentation, severe bradycardia and hypotension for which she required vasopressors. There was a plan to put a temporary pacemaker that she did not need. She had an external jugular for which she had blood cultures done few days ago and just came back positive for gram-positive cocci in clusters. Patient was receiving IV Zosyn, vancomycin was added yesterday after blood cultures were repeated to be positive. Identification and susceptibility still pending. Patient is [place]ut[place]-speaking. Son is at bedside. Currently in ICU. Reports pain in the feet. Has generalized weakness and fatigue.        Past medical surgical and social history were reviewed and are as detailed below  Past Medical History:   Diagnosis Date    Asthma     CAD (coronary artery disease)     CKD (chronic kidney disease) stage 3, GFR 30-59 ml/min ([facility])     Colitis     Diabetes mellitus ([address]     Hyperlipidemia     Hypertension     PAD (peripheral artery disease) ([facility])     Prolonged emergence from general anesthesia     P[facility] vascular disease) [facility]     Thyroid disease        Past Surgical History:   Procedure Laterality Date    CARDIAC SURGERY      CATARACT REMOVAL WITH IMPLANT Bilateral 11- 11-     SECTION      CHOLECYSTECTOMY, LAPAROSCOPIC N/A 2022    LAPAROSCOPIC CHOLECYSTECTOMY performed by Daniel Dang MD at [address] [place] [address] N/A 2019 COLONOSCOPY DIAGNOSTIC performed by Jason Paz MD at [address] [facility] GRAFT  06/2019    unknown vessels    HYSTERECTOMY      TOE AMPUTATION Right     3rd toe       Current Medications:     furosemide  40 mg IntraVENous BID    potassium chloride  20 mEq Oral BID WC    lidocaine  5 mL IntraDERmal Once    sodium chloride flush  5-40 mL IntraVENous 2 times per day    vancomycin  750 mg IntraVENous Q24H    prazosin  1 mg Oral Nightly    fentanNYL  50 mcg IntraVENous Once    rocuronium  1 mg/kg IntraVENous Once    pantoprazole (PROTONIX) 40 mg injection  40 mg IntraVENous Daily    piperacillin-tazobactam  3,375 mg IntraVENous Q8H    sennosides-docusate sodium  2 tablet Oral BID    insulin lispro  0-6 Units SubCUTAneous 4 times per day    vancomycin (VANCOCIN) intermittent dosing (placeholder)   Other RX Placeholder    dextrose bolus (hypoglycemia)  250 mL IntraVENous Once    sodium chloride flush  5-40 mL IntraVENous 2 times per day    sertraline  200 mg Oral Daily    montelukast  10 mg Oral Nightly    [Held by provider] metoprolol succinate  50 mg Oral BID    levothyroxine  50 mcg Oral Daily    [Held by provider] isosorbide dinitrate  20 mg Oral TID    [Held by provider] haloperidol  5 mg Oral Daily    [Held by provider] doxepin  150 mg Oral Nightly    [Held by provider] busPIRone  10 mg Oral BID    [Held by provider] atorvastatin  80 mg Oral Nightly    polyvinyl alcohol  1 drop Both Eyes BID    [Held by provider] ranolazine  1,000 mg Oral BID    [Held by provider] meclizine  25 mg Oral BID    [Held by provider] hydrALAZINE  50 mg Oral 3 times per day    insulin glargine  40 Units SubCUTAneous Nightly       Allergies:  Ambien [zolpidem tartrate], Capoten [captopril], Clioquinol, Cogentin [benztropine], Depakote [divalproex sodium], Effexor xr [venlafaxine hcl er], Geodon [ziprasidone hcl], Lisinopril, Lyrica [pregabalin], Navane [thiothixene], Pamelor [nortriptyline hcl], Remeron [mirtazapine], Risperdal [risperidone], Trazodone and nefazodone, and Wellbutrin [bupropion]    Social History     Socioeconomic History    Marital status:      Spouse name: Not on file    Number of children: Not on file    Years of education: Not on file    Highest education level: Not on file   Occupational History    Not on file   Tobacco Use    Smoking status: Never Smoker    Smokeless tobacco: Never Used   Vaping Use    Vaping Use: Never used   Substance and Sexual Activity    Alcohol use: Never    Drug use: Never    Sexual activity: Not on file   Other Topics Concern    Not on file   Social History Narrative         Lives With: Spouse    Type of Home: [address] [place] [address] [address]: One level    Home Access: Elevator    Bathroom Shower/Tub: Tub/Shower unit(Simultaneous filing. User may not have seen previous data.)    Bathroom Equipment: Grab bars in shower, Shower chair    Home Equipment: Rolling walker, [address] 450 bed    ADL Assistance: Needs assistance    Homemaking Assistance: Needs assistance    Homemaking Responsibilities: No    Ambulation Assistance: Independent    Transfer Assistance: Independent    Active : No    Additional Comments: Pt wears orthopedic shoes at baseline    The patient states she is current with [facility](RN per the ). The patient had a walker, but obtained a hospital bed, wheel chair, BSC and O2 last admission (from [address] [place] [address] Road). pharmacy is [address] [place] [address],[address] [address]., [facility] [place].       Transportation is provided by [employer]	[place] () per the patient     Social Determinants of Health     Financial Resource Strain:     Difficulty of Paying Living Expenses: Not on file   Food Insecurity:     Worried About [address] in the Last Year: Not on file    Shayy of Food in the Last Year: Not on file   Transportation Needs:     Lack of Transportation (Medical): Not on file    Lack of Transportation (Non-Medical): Not on file   Physical Activity:     Days of Exercise per Week: Not on file    Minutes of Exercise per Session: Not on file   Stress:     Feeling of Stress : Not on file   Social Connections:     Frequency of Communication with Friends and Family: Not on file    Frequency of Social Gatherings with Friends and Family: Not on file    Attends Confucianist Services: Not on file    Active Member of [address] [place] [address] or Organizations: Not on file    Attends Club or Organization Meetings: Not on file    Marital Status: Not on file   Intimate Partner Violence:     Fear of Current or Ex-Partner: Not on file    Emotionally Abused: Not on file    Physically Abused: Not on file    Sexually Abused: Not on file   Housing Stability:     Unable to Pay for Housing in the Last Year: Not on file    Number of [place] in the Last Year: Not on file    Unstable Housing in the Last Year: Not on file         Family History:   History reviewed. No pertinent family history. Review of Systems  14 system review is negative other than HPI  Positive shortness of breath, mild cough without sputum production, bilateral legs pain, generalized weakness  Physical Exam  Vitals:    04/29/22 1545 04/29/22 1600 04/29/22 1615 04/29/22 1630   BP:       Pulse: 82 81 85 85   Resp: 11 11 17 20   Temp:       TempSrc:       SpO2: 100% 100% 100% 100%   Weight:       Height:         General Appearance: alert and oriented to person, place and time, well-developed and well-nourished, in no acute distress, on 4 L nasal cannula  Skin: warm and dry, no rash. Head: normocephalic and atraumatic  Eyes: anicteric sclerae  ENT: oropharynx clear and moist with normal mucous membranes.  No thrush  Lungs: normal respiratory effort,  fine basilar crackles  Heart: nl S1/S2, no murmur  Abdomen: soft, no tenderness, no H-S-megaly, + BS, obese  NEUROLOGICAL: alert and oriented x 3, no focal deficits  No leg edema  No erythema, no warmth, no tenderness  Left IJ CVP  Right IJ catheter  Clear urine in Colon      DATA:    Lab Results   Component Value Date    WBC 10.4 04/29/2022    HGB 8.5 (L) 04/29/2022    HCT 25.2 (L) 04/29/2022    MCV 74.2 (L) 04/29/2022     04/29/2022     Lab Results   Component Value Date    CREATININE 1.29 (H) 04/29/2022    BUN 34 (H) 04/29/2022     04/29/2022    K 3.9 04/29/2022    K 3.9 04/29/2022     04/29/2022    CO2 22 04/29/2022       Hepatic Function Panel:   Lab Results   Component Value Date    ALKPHOS 88 04/29/2022    ALT 18 04/29/2022    AST 53 04/29/2022    PROT 6.1 04/29/2022    BILITOT 0.5 04/29/2022    BILIDIR <0.2 03/09/2022    IBILI see below 03/09/2022    LABALBU 2.9 04/29/2022    LABALBU <7.0 01/06/2020       Microbiology:   Recent Labs     04/27/22  1030   BC Gram stain aerobic bottle  Gram positive cocci in clusters-resembling Staph  2 out of 2 blood cultures  Further testing performed at [address] [place] [address]     04/27/22  1030   BLOODCULT2 Gram stain aerobic bottle  Gram positive cocci in clusters-resembling Staph  2 out of 2 blood cultures  Further testing performed at [address] [place] [address]     04/27/22 2059   [place]:  NO GROWTH  Performed at [facility] [address] [address]  [phone]       No results for input(s): WNDABS in the last 72 hours. Recent Labs     04/28/22  0302   CULTRESP Direct Exam:  >10, <25 WBC'S/LPF  Direct Exam:  < 10 EPITHELIAL CELLS/LPF  Direct Exam:  RARE MIXED BACTERIAL MORPHOTYPES SEEN ON GRAM STAIN.   Cult,Respiratory:  NORMAL RESPIRATORY CARLY  Cult,Respiratory:  LIGHT GROWTH  Performed at [address]  [phone]      0 Result Notes    Component Ref Range & Units 4/29/22 0600 11/16/21 0649 8/25/21 0630 8/24/21 0603 9/30/20 0546 9/1/20 1430 6/29/20 0636   CRP High Sensitivity 0.0 - 5.0 mg/L 101.8 High IMPRESSION:    · Septic versus cardiogenic shock   · gram-positive bacteremia   · Acute cholecystitis status post laparoscopic cholecystectomy  · Acute on chronic kidney failure  · Obstructive sleep apnea on CPAP at home    Patient Active Problem List   Diagnosis    Major depressive disorder, recurrent, severe with psychotic symptoms [facility]    Diabetes mellitus ([facility]    Hypertension    HLD (hyperlipidemia)    Thyroid disease    Congestive heart failure (HCC)    Non-pressure ulcer of toe (HCC)    Atherosclerotic PVD with intermittent claudication ([facility])    Osteomyelitis ([facility]    Infection of skin    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    Surgical wound dehiscence, initial encounter    S/P amputation of lesser toe, right (HCC)    Rectal bleeding    Athscl native arteries of left leg w ulceration oth prt foot ([facility]    JESICA (obstructive sleep apnea)    Secondary diabetes mellitus ([facility]    Chest pain    Peripheral vascular disease ([facility]    Cellulitis    Syncope and collapse    Nonrheumatic mitral (valve) insufficiency    Ischemic myocardial dysfunction    Pulmonary hypertension (HCC)    Acute on chronic combined systolic and diastolic congestive heart failure (HCC)    Asthma    RADHA (acute kidney injury) ([facility]    Dizziness    Acute cerebrovascular accident ([facility]    Abnormal gait    Anxiety    Gastritis, unspecified, without bleeding    Pure hypercholesterolemia    Schizophrenia ([facility]    Coronary arteriosclerosis    Extrapyramidal disease    Gangrene of toe (HCC)    Drug-induced hypotension    Osteomyelitis of right foot (HCC)    Nausea and vomiting    Mild intermittent asthma without complication    Heart failure, diastolic, with acute decompensation ([facility])    Major depressive disorder, single episode, unspecified    Type 2 diabetes mellitus with diabetic neuropathy, unspecified ([facility]    Obesity (BMI 30-39. 9)    Disorder of carotid artery ([address]    NSTEMI (non-ST elevated myocardial infarction) ([facility]    Pneumonia    CKD (chronic kidney disease) stage 3, GFR 30-59 ml/min ([facility])    Pain in right hand    Anesthesia of skin    Carpal tunnel syndrome    History of angioplasty of peripheral vessel    History of coronary artery bypass surgery    Paresthesia of skin    Acute decompensated heart failure (HCC)    Depression    Abdominal pain    Abdominal pain, right upper quadrant       PLAN:  · Continue IV vancomycin  · Repeat the blood cultures  · Change Zosyn to meropenem to decrease risk of nephrotoxicity  · Follow-up CBC BMP  · Agree with CVP removal and placement of PICC line      Lesvia Acuña MD

## 2022-04-29 NOTE — PLAN OF CARE
Problem: Discharge Planning  Goal: Discharge to home or other facility with appropriate resources  Outcome: Progressing     Problem: Pain  Goal: Verbalizes/displays adequate comfort level or baseline comfort level  Outcome: Progressing     Problem: ABCDS Injury Assessment  Goal: Absence of physical injury  Outcome: Progressing     Problem: Safety - Adult  Goal: Free from fall injury  Outcome: Progressing     Problem: Chronic Conditions and Co-morbidities  Goal: Patient's chronic conditions and co-morbidity symptoms are monitored and maintained or improved  Outcome: Progressing     Problem: Safety - Medical Restraint  Goal: Remains free of injury from restraints (Restraint for Interference with Medical Device)  Description: INTERVENTIONS:  1. Determine that other, less restrictive measures have been tried or would not be effective before applying the restraint  2. Evaluate the patient's condition at the time of restraint application  3. Inform patient/family regarding the reason for restraint  4. Q2H: Monitor safety, psychosocial status, comfort, nutrition and hydration  Outcome: Progressing     Problem: Nutrition Deficit:  Goal: Optimize nutritional status  Outcome: Progressing     Problem: Skin/Tissue Integrity  Goal: Absence of new skin breakdown  Description: 1. Monitor for areas of redness and/or skin breakdown  2. Assess vascular access sites hourly  3. Every 4-6 hours minimum:  Change oxygen saturation probe site  4. Every 4-6 hours:  If on nasal continuous positive airway pressure, respiratory therapy assess nares and determine need for appliance change or resting period.   Outcome: Progressing MD aware

## 2022-04-29 NOTE — CARE COORDINATION
AM ROUNDS COMPLETED, PT WAS EXTUBATED 4/28. PT/OT ORDERED TO ASSIST WITH DC PLANNING.  + BC, MAY NEED IV ABX. IV CHECK SENT. Eileen Raymundo VS [facility].

## 2022-04-29 NOTE — PROGRESS NOTES
Progress Note  Date:2022       Room:[place] [address]  Patient Aurelio Alicea     YOB: 1955     Age:66 y.o.    ROS: Point review system cannot be obtained due to acuity of medical condition. Subjective      Objective         Vitals Last 24 Hours:  TEMPERATURE:  Temp  Av °F (37.2 °C)  Min: 98.4 °F (36.9 °C)  Max: 99.7 °F (37.6 °C)  RESPIRATIONS RANGE: Resp  Av.4  Min: 9  Max: 27  PULSE OXIMETRY RANGE: SpO2  Av.7 %  Min: 95 %  Max: 100 %  PULSE RANGE: Pulse  Av.2  Min: 68  Max: 105  BLOOD PRESSURE RANGE: No data recorded.  ; No data recorded. I/O (24Hr): Intake/Output Summary (Last 24 hours) at 2022 1017  Last data filed at 2022 0647  Gross per 24 hour   Intake 3297.81 ml   Output 2080 ml   Net 1217.81 ml     Objective:  General Appearance:  Ill-appearing. Vital signs: (most recent): Blood pressure (!) 128/109, pulse 71, temperature 98.6 °F (37 °C), temperature source Rectal, resp. rate 11, height 5' 4\" (1.626 m), weight 257 lb 15 oz (117 kg), SpO2 100 %, not currently breastfeeding. HEENT: Normal HEENT exam.    Heart: S1 normal and S2 normal.    Abdomen: Abdomen is soft. Bowel sounds are normal.   There is no abdominal tenderness. Pulses: Distal pulses are intact. Neurological: (Sedated, minimal responsive). Pupils:  Pupils are equal, round, and reactive to light. Skin:  Warm and dry. Labs/Imaging/Diagnostics    Labs:  CBC:  Recent Labs     22  1357 22  0450 22  0558 22  1045 22  0600   WBC 15.0*  --  8.5  --  10.4   RBC 3.68*  --  3.09*  --  3.39*   HGB 9.1*   < > 7.7* 9.7* 8.5*   HCT 27.3*  --  23.1*  --  25.2*   MCV 74.1*  --  74.7*  --  74.2*   RDW 19.3*  --  19.0*  --  19.9*     --  136  --  174    < > = values in this interval not displayed.      CHEMISTRIES:  Recent Labs     22  0600 22  0803 22  2221 22  0450 22  0557 22  1045 22  0600   NA 133*   < > 132*  --  136  --  138   K 4.6   < > 4.0  --  3.5  --  3.9  3.9      < > 103  --  106  --  106   CO2 20   < > 20  --  20  --  22   BUN 59*   < > 51*  --  45*  --  34*   CREATININE 2.99*   < > 2.17*   < > 1.75* 1.7* 1.29*   GLUCOSE 183*   < > 204*  --  121*  --  95   PHOS  --   --   --   --  2.2*  --  2.6   MG 2.1  --   --   --  2.2  --  2.0    < > = values in this interval not displayed. PT/INR:  Recent Labs     04/27/22  1017 04/27/22 2034   PROTIME 16.4* 17.3*   INR 1.3 1.4     APTT:  Recent Labs     04/27/22 2034   APTT 149.9*     LIVER PROFILE:  Recent Labs     04/27/22  0600 04/28/22  0557 04/29/22  0600   AST 52* 58* 53*   ALT 17 15 18   BILITOT 0.5 0.4 0.5   ALKPHOS 84 75 88       Imaging Last 24 Hours:  CT ABDOMEN PELVIS WO CONTRAST Additional Contrast? None    Result Date: 4/23/2022  EXAM:  CT ABDOMEN PELVIS WO CONTRAST History: Abdominal pain Technique: Multiple contiguous axial images were obtained of the abdomen and pelvis from the level of the lung bases through the ischial tuberosities without contrast. Multiplanar reformats were obtained. Comparison: CT abdomen pelvis April 20, 2022 Findings: Lung bases are clear. Heart size is enlarged. Mitral annular calcification. Evidence of prior thoracic surgery. Lack of intravenous contrast precludes optimal evaluation of the abdominal and pelvic viscera. The unenhanced liver, spleen, stomach, pancreas, and adrenal glands appear within normal limits. The gallbladder is mildly distended but not significant changed from recent CT. There are debris is present within the gallbladder lumen. No gallbladder wall thickening or pericholecystic fluid identified by CT. Mild bilateral perinephric stranding. No urinary tract calculi or hydronephrosis. The urinary bladder is decompressed. The uterus is absent. Abdominal aorta is nonaneurysmal  . No retroperitoneal or abdominal/pelvic lymphadenopathy. No small bowel obstruction.  No overt colonic mass or pericolonic inflammation. No findings of acute appendicitis No free fluid, loculated fluid collection, or pneumoperitoneum. No acute osseous abnormality. Mildly distended gallbladder containing gallbladder sludge and/or tiny gallstones without significant interval change since April 20, 2022 CT. No CT findings of acute cholecystitis. Mild bilateral perinephric stranding is nonspecific. Correlation for polynephritis is recommended. All CT scans at this facility use dose modulation, iterative reconstruction, and/or weight based dosing when appropriate to reduce radiation dose to as low as reasonably achievable. US ABDOMEN LIMITED    Result Date: 4/23/2022  EXAM: US ABDOMEN LIMITED HISTORY: Right upper quadrant pain TECHNIQUE: Ultrasound evaluation was performed of the right upper quadrant of the abdomen. COMPARISON: CT abdomen pelvis April 23, 2022 FINDINGS: Normal echogenicity and contour of the liver. No liver lesion or intrahepatic biliary dilatation. Main portal vein is patent. The gallbladder is mildly distended. Layering echogenic material is identified within the lumen of the gallbladder. No pericholecystic fluid. Gallbladder wall thickness is at the upper limits of normal measuring 2.7 mm. Negative Velez sign reported. Common bile duct is normal in diameter measuring 4 mm. No overt abnormality of the pancreas. Nonspecific findings of the gallbladder including mildly distended gallbladder containing layering sludge and/or tiny gallstones with wall thickness of the upper limits of normal measuring 2.7 mm. No pericholecystic fluid to suggest acute cholecystitis.      Assessment//Plan           Hospital Problems           Last Modified POA    * (Principal) Abdominal pain 4/23/2022 Yes    Abdominal pain, right upper quadrant 4/25/2022 Yes      acute heather  CKD 3  Diabetes  CAD  RADHA on CKD  Hyperkalemia  Shock  Gram positive septicemia  sepsis  Assessment & Plan    4/24: Tegretol and aspirin for now as per surgery recommendation. For surgery for tomorrow. Sugars better controlled. She has right upper quadrant pain. Possible cholecystectomy tomorrow. 4/25: Patient is going for lap heather today. Anticipate discharge tomorrow. No overnight events no new complaints. Continue current care.hold nephrotoxic agents, cw IVF.  4/26: Acute urinary retention s/p Colon, urology evaluation, renal function is worsening we will get nephrology evaluation, treat hyperkalemia. Spoke with nursing. 4/27: He upon entering room patient is lethargic, unable to arouse. Spoke with the  at bedside. Blood pressure is low, used  to speak with them. Transfer patient to ICU for hypotension/shock Patient was started on Flomax yesterday. We will get psych eval for polypharmacy. Patient mental status and condition is to be changed from yesterday. Spoke with cardiac critical care team about the care plan  CC time was 45 min  4/28: Spoke with the daughter-in-law, patient intubated and is on pressors. Taper down pressors as tolerated. Patient is on heparin drip for possible PE, renal function is improving. Questionable complete heart block. Follow cardiology, continue IV antibiotic for gram-positive septicemia with multiorgan failure,  4/29: Patient was extubated, spoke with the daughter at bedside. Continue current care. Patient is more stable compared to before. Follow-up ID continue with IV antibiotics, family would like SNF placement. PT OT ordered. Electronically signed by Nicole Moreira MD on 4/24/22 at 11:41 AM EDT    Subjective:  Symptoms:  She reports malaise and weakness. No shortness of breath, cough, chest pain, headache, chest pressure, anorexia, diarrhea or anxiety. Diet:  No vomiting. Review of Systems   Respiratory: Negative for cough and shortness of breath. Cardiovascular: Negative for chest pain. Gastrointestinal: Negative for anorexia, diarrhea and vomiting.    Neurological: Positive for weakness.

## 2022-04-29 NOTE — PLAN OF CARE
Nutrition Problem #1: Inadequate oral intake  Intervention: Food and/or Nutrient Delivery: Modify Current Diet,Start Oral Nutrition Supplement  Nutritional

## 2022-04-29 NOTE — PROGRESS NOTES
[facility] OCCUPATIONAL THERAPY EVALUATION - ACUTE     NAME: Kailey Preciado  : 1955 (61 y.o.)  MRN: [id number]  CODE STATUS: Full Code  Room: [place] [address]    Date of Service: 2022    Patient Diagnosis(es): Abdominal pain, right upper quadrant [R10.11]  Abdominal pain [R10.9]  Intractable vomiting with nausea, unspecified vomiting type [R11.2]   Patient Active Problem List    Diagnosis Date Noted    Abdominal pain, right upper quadrant     Abdominal pain 2022    Depression     Acute decompensated heart failure ([facility] 2021    History of angioplasty of peripheral vessel 2021    History of coronary artery bypass surgery 2021    Pneumonia 2021    CKD (chronic kidney disease) stage 3, GFR 30-59 ml/min ([facility])     Pain in right hand     Carpal tunnel syndrome 2021    NSTEMI (non-ST elevated myocardial infarction) ([facility] 2021    Anesthesia of skin 2021    Paresthesia of skin 2021    Disorder of carotid artery ([facility] 2021    Obesity (BMI 30-39.9) 2020    Heart failure, diastolic, with acute decompensation ([address] 2020    Nausea and vomiting     Abnormal gait 2020    Gangrene of toe ([address] 2020    Acute cerebrovascular accident ([facility] 2020    Dizziness 2020    Acute on chronic combined systolic and diastolic congestive heart failure ([address] 2020    RADHA (acute kidney injury) ([address] 2020    Nonrheumatic mitral (valve) insufficiency 2020    Pulmonary hypertension ([address] 2020    Syncope and collapse 2020    Cellulitis 2020    Chest pain 2020    JESICA (obstructive sleep apnea)     Athscl native arteries of left leg w ulceration oth prt foot ([facility] 2019    Rectal bleeding     Surgical wound dehiscence, initial encounter 2019    S/P amputation of lesser toe, right ([facility] 2019    Ischemic myocardial dysfunction 2019    Coronary arteriosclerosis 2019    Extrapyramidal disease 2019    Drug-induced hypotension 2019    Osteomyelitis of right foot ([address] 2019    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     Infection of skin 2019    Osteomyelitis ([address] 2019    Atherosclerotic PVD with intermittent claudication ([address] 2019    Peripheral vascular disease ([address] 2019    Non-pressure ulcer of toe ([address] 2019    Asthma 2019    Anxiety 2019    Gastritis, unspecified, without bleeding 2019    Pure hypercholesterolemia 2019    Schizophrenia ([address] 2019    Mild intermittent asthma without complication     Type 2 diabetes mellitus with diabetic neuropathy, unspecified ([address] 2019    Major depressive disorder, single episode, unspecified 2019    Diabetes mellitus ([address] 2019    Hypertension 2019    HLD (hyperlipidemia) 2019    Thyroid disease 2019    Congestive heart failure ([address] 2019    Secondary diabetes mellitus ([address] 2019    Major depressive disorder, recurrent, severe with psychotic symptoms ([address] 2019        Past Medical History:   Diagnosis Date    Asthma     CAD (coronary artery disease)     CKD (chronic kidney disease) stage 3, GFR 30-59 ml/min ([facility])     Colitis     Diabetes mellitus ([address]     Hyperlipidemia     Hypertension     PAD (peripheral artery disease) ([facility])     Prolonged emergence from general anesthesia     PVD (peripheral vascular disease) ([address]     Thyroid disease      Past Surgical History:   Procedure Laterality Date    CARDIAC SURGERY      CATARACT REMOVAL WITH IMPLANT Bilateral 11- 11-     SECTION      CHOLECYSTECTOMY, LAPAROSCOPIC N/A 2022    LAPAROSCOPIC CHOLECYSTECTOMY performed by Alaina Valerio MD at [address] [place] [address] N/A 2019    COLONOSCOPY DIAGNOSTIC performed by [facility]ai Denson [facility] at [facility]    CORONARY ARTERY BYPASS GRAFT  06/2019    unknown vessels    HYSTERECTOMY      TOE AMPUTATION Right     3rd toe        Restrictions  Restrictions/Precautions: Fall Risk              Safety Devices: Safety Devices  Type of Devices: All fall risk precautions in place,Bed alarm in place,Call light within reach     General:       Subjective:Pt pleasant and cooperative with care. Pt seen with family and  present.        Pain at start of treatment:Pt reports pain with movement of bilateral LE's not quantified at this time    Pain at end of treatment:   No    Prior Level of Function:       OBJECTIVE:     Orientation Status:  Orientation  Orientation Level: Oriented to person    Observation:  Observation/Palpation  Posture: Fair  Observation: flexed posture    Cognition Status:  Cognition  Cognition Comment: Pt follows one step commnads with increased time    Perception Status:  Perception  Overall Perceptual Status: WFL    Vision and Hearing Status:  Hearing  Hearing: Within functional limits   Vision - Basic Assessment  Vision Comments: unable to formally assess    GROSS ASSESSMENT AROM/PROM:     PROM: Within functional limits    ROM:   LUE PROM (degrees)  LUE PROM: WFL  Left Hand PROM (degrees)  Left Hand PROM: WFL  RUE PROM (degrees)  RUE PROM: WFL  Right Hand PROM (degrees)  Right Hand PROM: WFL    UE STRENGTH:  Strength:  (2-/5)    UE COORDINATION:  Coordination:  (impaired FMC and gross coordination)    UE TONE:  Tone: Normal    UE SENSATION:  Sensation: Impaired (neuropathy bilateral UE and LE)    Hand Dominance:  Hand Dominance  Hand Dominance: Right    ADL Status:  ADL  Feeding: Unable to assess(comment)  Grooming: Dependent/Total  UE Bathing: Dependent/Total  LE Bathing: Dependent/Total  UE Dressing: Dependent/Total  LE Dressing: Dependent/Total  Toileting: Unable to assess(comment)  Additional Comments: catheter in place          Functional Mobility:     Sitting:  (Max/D)    Bed Mobility  Bed mobility  Supine to Sit: Dependent/Total,2 Person assistance  Sit to Supine: 2 Person assistance,Dependent/Total    Seated and Standing Balance:  Sitting:  (Max/D)    Functional Endurance:  Activity Tolerance  Activity Tolerance: Patient limited by fatigue,Treatment limited secondary to decreased cognition,Treatment limited secondary to medical complications (free text),Patient limited by pain    D/C Recommendations:       Equipment Recommendations:       OT Education:        OT Follow Up:          Assessment/Discharge Disposition:     Performance deficits / Impairments: Decreased functional mobility ,Decreased safe awareness,Decreased balance,Decreased coordination,Decreased ADL status,Decreased cognition,Decreased posture,Decreased endurance,Decreased ROM,Decreased strength,Decreased sensation,Decreased fine motor control  Prognosis: Guarded  Discharge Recommendations: Continue to assess pending progress  Decision Making: High Complexity  History: multiple  Exam: multiple  Assistance / Modification: dependent    AMPAC (Six Click) Self care Score   How much help for putting on and taking off regular lower body clothing?: Total  How much help for Bathing?: Total  How much help for Toileting?: Total  How much help for putting on and taking off regular upper body clothing?: Total  How much help for taking care of personal grooming?: Total  How much help for eating meals?: Total  AM-PAC Inpatient Daily Activity Raw Score: 6  AM-PAC Inpatient ADL T-Scale Score : 17.07  ADL Inpatient CMS 0-100% Score: 100    Therapy key for assistance levels -   Independent = Pt. is able to perform task with no assistance but may require a device   Stand by assistance = Pt. does not perform task at an independent level but does not need physical assistance, requires verbal cues  Minimal, Moderate, Maximal Assistance = Pt. requires physical assistance (25%, 50%, 75% assist from helper) for task but is able to actively participate in task   Dependent = Pt. requires total assistance with task and is not able to actively participate with task completion       Plan:  Plan  Times per Week: 1-4x/wk  Current Treatment Recommendations: Neuromuscular re-education,Strengthening,Balance training,Functional mobility training,Self-Care / ADL,Coordination training,Cognitive/Perceptual training,Cognitive reorientation,Endurance training    Goals:   Patient will:    - Improve functional endurance to tolerate/complete 4-10 mins of ADL's  - Be Mod A in UB ADLs   - Be Mod A in ADL transfers without LOB  - Improve bilateral UE strength and endurance to Fair- in order to participate in self-care activities as projected.   - Sequence self-care tasks with occasional verbal cues    Patient Goal: Patient goals : Not stated at this time      Discussed and agreed upon: Yes Comments: Pt and family in agreement      Therapy Time:   Individual   Time In 1331   Time Out 1346   Minutes 15     Minute Variance:       Eval: 15 minutes     Electronically signed by:    REAGAN Hernandez,   3/47/7246, 2:06 PM Electronically signed by REAGAN Hernandez on 5/74/70 at 2:04 PM EDT

## 2022-04-29 NOTE — CARE COORDINATION
LSW meet with pt and pt's family at bedside. Pt is alert and oriented. LSW provided freedom of choice SNF list to family. Explain to family and pt there is a [place] speaking program there at [facility]. Pt and family both agreed on [facility] for short term rehab. Referral made and information faxed on 4/29/2022 to [phone] admission.      Electronically signed by ALMAZ Holman on 4/29/2022 at 2:45 PM

## 2022-04-29 NOTE — PROGRESS NOTES
Nephrology Progress Note    Assessment:  61 y.o. female with history s/f T2DM, HTN, HLD, PAD, hypothyroidism, asthma who presented w/ RUQ/epigastric pain for ~1 week.     1. RADHA on CKD stage III: most likely 2/2 hypotension +/- urinary retention , function already worsening prior to surgery, baseline Scr ~1.1-1.2 w/ eGFR high 40s/low 50s, got toradol on 4/22,   2. Hyperkalemia  3. Hyponatremia  4. Hypoalbuminemia  5. Acute cholecystitis  6. Acute urinary retention: urology onboard   7. Encephalopathy: now intubated (4/27)  8.  Shock: on pressors and inotrope     Plan:  - iv lasix 40 bid with kcl 20 bid  - improved gfr  - lokelma dcd       Patient Active Problem List:     Major depressive disorder, recurrent, severe with psychotic symptoms ([address]     Diabetes mellitus ([address]     Hypertension     HLD (hyperlipidemia)     Thyroid disease     Congestive heart failure (HCC)     Non-pressure ulcer of toe (HCC)     Atherosclerotic PVD with intermittent claudication (HCC)     Osteomyelitis (HCC)     Infection of skin     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     Surgical wound dehiscence, initial encounter     S/P amputation of lesser toe, right (HCC)     Rectal bleeding     Athscl native arteries of left leg w ulceration oth prt foot (HCC)     JESICA (obstructive sleep apnea)     Secondary diabetes mellitus (HCC)     Chest pain     Peripheral vascular disease (HCC)     Cellulitis     Syncope and collapse     Nonrheumatic mitral (valve) insufficiency     Ischemic myocardial dysfunction     Pulmonary hypertension (HCC)     Acute on chronic combined systolic and diastolic congestive heart failure (HCC)     Asthma     RADHA (acute kidney injury) ([address]     Dizziness     Acute cerebrovascular accident ([address]     Abnormal gait     Anxiety     Gastritis, unspecified, without bleeding     Pure hypercholesterolemia     Schizophrenia ([address]     Coronary arteriosclerosis     Extrapyramidal disease     Gangrene of toe (HCC)     Drug-induced hypotension     Osteomyelitis of right foot ([facility])     Nausea and vomiting     Mild intermittent asthma without complication     Heart failure, diastolic, with acute decompensation ([facility])     Major depressive disorder, single episode, unspecified     Type 2 diabetes mellitus with diabetic neuropathy, unspecified ([facility])     Obesity (BMI 30-39. 9)     Disorder of carotid artery ([facility])     NSTEMI (non-ST elevated myocardial infarction) ([facility])     Pneumonia     CKD (chronic kidney disease) stage 3, GFR 30-59 ml/min ([facility])     Pain in right hand     Anesthesia of skin     Carpal tunnel syndrome     History of angioplasty of peripheral vessel     History of coronary artery bypass surgery     Paresthesia of skin     Acute decompensated heart failure ([facility])     Depression     Abdominal pain     Abdominal pain, right upper quadrant      Subjective:  Admit Date: 4/22/2022    Interval History:  Doing very well. Off vent. bp good. No fevers.   Good uo but positive fluid balance    Medications:  Scheduled Meds:   vancomycin  750 mg IntraVENous Q24H    prazosin  1 mg Oral Nightly    fentanNYL  50 mcg IntraVENous Once    rocuronium  1 mg/kg IntraVENous Once    pantoprazole (PROTONIX) 40 mg injection  40 mg IntraVENous Daily    piperacillin-tazobactam  3,375 mg IntraVENous Q8H    sennosides-docusate sodium  2 tablet Oral BID    insulin lispro  0-6 Units SubCUTAneous 4 times per day    vancomycin (VANCOCIN) intermittent dosing (placeholder)   Other RX Placeholder    dextrose bolus (hypoglycemia)  250 mL IntraVENous Once    sodium chloride flush  5-40 mL IntraVENous 2 times per day    sertraline  200 mg Oral Daily    montelukast  10 mg Oral Nightly    [Held by provider] metoprolol succinate  50 mg Oral BID    levothyroxine  50 mcg Oral Daily    [Held by provider] isosorbide dinitrate  20 mg Oral TID    [Held by provider] haloperidol  5 mg Oral Daily    [Held by provider] doxepin  150 mg Oral Nightly    [Held by provider] busPIRone  10 mg Oral BID    [Held by provider] atorvastatin  80 mg Oral Nightly    polyvinyl alcohol  1 drop Both Eyes BID    [Held by provider] ranolazine  1,000 mg Oral BID    [Held by provider] meclizine  25 mg Oral BID    [Held by provider] hydrALAZINE  50 mg Oral 3 times per day    insulin glargine  40 Units SubCUTAneous Nightly     Continuous Infusions:   heparin (PORCINE) Infusion 7.5 Units/kg/hr (04/29/22 0647)    sodium chloride      sodium chloride      dextrose         CBC:   Recent Labs     04/28/22  0558 04/28/22  0558 04/28/22  1045 04/29/22  0600   WBC 8.5  --   --  10.4   HGB 7.7*   < > 9.7* 8.5*     --   --  174    < > = values in this interval not displayed. CMP:    Recent Labs     04/27/22  2221 04/28/22  0450 04/28/22  0557 04/28/22  1045 04/29/22  0600   *  --  136  --  138   K 4.0  --  3.5  --  3.9  3.9     --  106  --  106   CO2 20  --  20  --  22   BUN 51*  --  45*  --  34*   CREATININE 2.17*   < > 1.75* 1.7* 1.29*   GLUCOSE 204*  --  121*  --  95   CALCIUM 8.4*  --  7.8*  --  8.2*   LABGLOM 22.8*   < > 29.2* 30* 41.5*    < > = values in this interval not displayed. Troponin:   Recent Labs     04/29/22  0600   TROPONINI 0.625*     BNP: No results for input(s): BNP in the last 72 hours. INR:   Recent Labs     04/27/22 2034   INR 1.4     Lipids: No results for input(s): CHOL, LDLDIRECT, TRIG, HDL, AMYLASE, LIPASE in the last 72 hours. Liver:   Recent Labs     04/29/22  0600   AST 53*   ALT 18   ALKPHOS 88   PROT 6.1*   LABALBU 2.9*   BILITOT 0.5     Iron:  No results for input(s): IRONS, FERRITIN in the last 72 hours. Invalid input(s): LABIRONS  Urinalysis: No results for input(s): UA in the last 72 hours.     Objective:  Vitals: BP (!) 128/109   Pulse 71   Temp 98.6 °F (37 °C) (Rectal)   Resp 11   Ht 5' 4\" (1.626 m)   Wt 257 lb 15 oz (117 kg)   SpO2 100%   BMI 44.27 kg/m²    Wt Readings from Last 3 Encounters:   04/29/22 257 lb 15 oz (117 kg) 04/20/22 225 lb (102.1 kg)   04/11/22 226 lb (102.5 kg)      24HR INTAKE/OUTPUT:      Intake/Output Summary (Last 24 hours) at 4/29/2022 1050  Last data filed at 4/29/2022 5446  Gross per 24 hour   Intake 3297.81 ml   Output 2080 ml   Net 1217.81 ml     General: off vent, in NAD  HEENT: normocephalic, atraumatic,   Lungs:coarse breath sounds  Heart: regular rate and rhythm, no murmurs or rubs  Abdomen: soft, non-tender, non-distended  Ext: no cyanosis, ++ peripheral edema  Neuro: awake and oriented      Electronically signed by Kellie Caceres MD, MD

## 2022-04-29 NOTE — PROGRESS NOTES
UROLOGY CONSULT Progress Note-Dr Sandoval    4/29/2022   1:17 PM    Name:  Shan Wise  MRN:    [id number]     [place]:     [de-identified]   Room:[address] Day: 10     Admit Date: 4/22/2022  9:11 PM  PCP: Devon Aguirre    Subjective:     C/C: Urinary retention  Chief Complaint   Patient presents with    Abdominal Pain     n/v, Discharged from ER a couple days prior        Interval History: Status: Patient has been extubated and is hemodynamically stable    Past Medical History:   Diagnosis Date    Asthma     CAD (coronary artery disease)     CKD (chronic kidney disease) stage 3, GFR 30-59 ml/min ([facility]     Colitis     [facility]     Hyperlipidemia     Hypertension     [facility]     Prolonged emergence from general anesthesia     PVD [facility]peripheral vascular disease) [facility]     Thyroid disease        ROS:  Review of Systems    Medications: Allergies:    Allergies   Allergen Reactions    Ambien [Zolpidem Tartrate]     Capoten [Captopril]     Clioquinol     Cogentin [Benztropine]     Depakote [Divalproex Sodium]     Effexor Xr [Venlafaxine Hcl Er]     Geodon [Ziprasidone Hcl]     Lisinopril      Hyperkalemia: 4/21/20 potassium was 6.7    Lyrica [Pregabalin]     Navane [Thiothixene]     Pamelor [Nortriptyline Hcl]     Remeron [Mirtazapine]     Risperdal [Risperidone]     Trazodone And Nefazodone     Wellbutrin [Bupropion]        Current Meds: furosemide (LASIX) injection 40 mg, BID  potassium chloride (KLOR-CON M) extended release tablet 20 mEq, BID WC  lidocaine 2 % injection 5 mL, Once  sodium chloride flush 0.9 % injection 5-40 mL, 2 times per day  sodium chloride flush 0.9 % injection 5-40 mL, PRN  0.9 % sodium chloride infusion, PRN  0.9 % sodium chloride infusion, Once  vancomycin (VANCOCIN) 750 mg in dextrose 5 % 250 mL IVPB, Q24H  prazosin (MINIPRESS) capsule 1 mg, Nightly  fentaNYL (SUBLIMAZE) injection 50 mcg, Once  rocuronium [facility]) injection 105 mg, Once  atropine injection 1 mg, PRN  pantoprazole (PROTONIX) 40 mg in sodium chloride (PF) 10 mL injection, Daily  piperacillin-tazobactam (ZOSYN) 3,375 mg in dextrose 5 % 50 mL IVPB extended infusion (mini-bag), Q8H  sennosides-docusate sodium (SENOKOT-S) 8.6-50 MG tablet 2 tablet, BID  insulin lispro (HUMALOG) injection vial 0-6 Units, 4 times per day  vancomycin (VANCOCIN) intermittent dosing (placeholder), RX Placeholder  heparin (porcine) injection 4,000 Units, PRN  heparin (porcine) injection 2,000 Units, PRN  heparin 25,000 units in dextrose 5% 250 mL (premix) infusion, Continuous  dextrose 50 % IV solution, PRN  dextrose bolus (hypoglycemia) 10% 250 mL, Once  0.9 % sodium chloride infusion, PRN  sodium chloride flush 0.9 % injection 5-40 mL, 2 times per day  sodium chloride flush 0.9 % injection 5-40 mL, PRN  0.9 % sodium chloride infusion, PRN  ondansetron (ZOFRAN-ODT) disintegrating tablet 4 mg, Q8H PRN   Or  ondansetron (ZOFRAN) injection 4 mg, Q6H PRN  polyethylene glycol (GLYCOLAX) packet 17 g, Daily PRN  acetaminophen (TYLENOL) tablet 650 mg, Q6H PRN   Or  acetaminophen (TYLENOL) suppository 650 mg, Q6H PRN  albuterol (PROVENTIL) nebulizer solution 2.5 mg, Q6H PRN  albuterol sulfate  (90 Base) MCG/ACT inhaler 2 puff, PRN  sertraline (ZOLOFT) tablet 200 mg, Daily  montelukast (SINGULAIR) tablet 10 mg, Nightly  [Held by provider] metoprolol succinate (TOPROL XL) extended release tablet 50 mg, BID  levothyroxine (SYNTHROID) tablet 50 mcg, Daily  [Held by provider] isosorbide dinitrate (ISORDIL) tablet 20 mg, TID  hydrOXYzine (VISTARIL) capsule 50 mg, TID PRN  [Held by provider] haloperidol (HALDOL) tablet 5 mg, Daily  [Held by provider] doxepin (SINEQUAN) capsule 150 mg, Nightly  dicyclomine (BENTYL) capsule 10 mg, Q6H PRN  [Held by provider] busPIRone (BUSPAR) tablet 10 mg, BID  [Held by provider] atorvastatin (LIPITOR) tablet 80 mg, Nightly  polyvinyl alcohol (LIQUIFILM TEARS) 1.4 % ophthalmic solution 1 drop, BID  [Held by provider] ranolazine (RANEXA) extended release tablet 1,000 mg, BID  [Held by provider] meclizine (ANTIVERT) tablet 25 mg, BID  glucose (GLUTOSE) 40 % oral gel 15 g, PRN  glucagon (rDNA) injection 1 mg, PRN  dextrose 5 % solution, PRN  [Held by provider] hydrALAZINE (APRESOLINE) tablet 50 mg, 3 times per day  insulin glargine (LANTUS) injection vial 40 Units, Nightly        Data:     Code Status:  Full Code    History reviewed. No pertinent family history. Social History     Socioeconomic History    Marital status:      Spouse name: Not on file    Number of children: Not on file    Years of education: Not on file    Highest education level: Not on file   Occupational History    Not on file   Tobacco Use    Smoking status: Never Smoker    Smokeless tobacco: Never Used   Vaping Use    Vaping Use: Never used   Substance and Sexual Activity    Alcohol use: Never    Drug use: Never    Sexual activity: Not on file   Other Topics Concern    Not on file   Social History Narrative         Lives With: Spouse    Type of Home: [address] [place] [address] [address]: One level    Home Access: Elevator    Bathroom Shower/Tub: Tub/Shower unit(Simultaneous filing. User may not have seen previous data.)    Bathroom Equipment: Grab bars in shower, Shower chair    Home Equipment: Rolling walker, [address] bed    ADL Assistance: Needs assistance    Homemaking Assistance: Needs assistance    Homemaking Responsibilities: No    Ambulation Assistance: Independent    Transfer Assistance: Independent    Active : No    Additional Comments: Pt wears orthopedic shoes at baseline    The patient states she is current with [facility](RN per the ). The patient had a walker, but obtained a hospital bed, wheel chair, BSC and O2 last admission (from [address]).            pharmacy is [address] [place] [address] Emir Perry. Transportation is provided by [employer]	[facility] () per the patient     Social Determinants of Health     Financial Resource Strain:     Difficulty of Paying Living Expenses: Not on file   Food Insecurity:     Worried About [address] in the Last Year: Not on file    Shayy of Food in the Last Year: Not on file   Transportation Needs:     Lack of Transportation (Medical): Not on file    Lack of Transportation (Non-Medical): Not on file   Physical Activity:     Days of Exercise per Week: Not on file    Minutes of Exercise per Session: Not on file   Stress:     Feeling of Stress : Not on file   Social Connections:     Frequency of Communication with Friends and Family: Not on file    Frequency of Social Gatherings with Friends and Family: Not on file    Attends Jewish Services: Not on file    Active Member of [address] [place] [address] [employer] or Organizations: Not on file    Attends Club or Organization Meetings: Not on file    Marital Status: Not on file   Intimate Partner Violence:     Fear of Current or Ex-Partner: Not on file    Emotionally Abused: Not on file    Physically Abused: Not on file    Sexually Abused: Not on file   Housing Stability:     Unable to Pay for Housing in the Last Year: Not on file    Number of [place] in the Last Year: Not on file    Unstable Housing in the Last Year: Not on file       Vitals:  BP (!) 128/109   Pulse 78   Temp 98.8 °F (37.1 °C) (Rectal)   Resp 11   Ht 5' 4\" (1.626 m)   Wt 257 lb 15 oz (117 kg)   SpO2 100%   BMI 44.27 kg/m²   Temp (24hrs), Av.9 °F (37.2 °C), Min:98.4 °F (36.9 °C), Max:99.7 °F (37.6 °C)    Recent Labs     22  1752 22  0013 22  0652 22  1209   POCGLU 186* 160* 107* 173*       I/O (24Hr):     Intake/Output Summary (Last 24 hours) at 2022 1317  Last data filed at 2022 1306  Gross per 24 hour   Intake 4237.99 ml   Output 2110 ml   Net 2127.99 ml       Labs:  Hematology:  Recent Labs     04/27/22 2034 04/28/22 0450 04/29/22  0600 04/29/22  0720   WBC  --    < > 10.4  --    HGB  --    < > 8.5*  --    HCT  --    < > 25.2*  --    PLT  --    < > 174  --    SEDRATE  --   --   --  51*   PROTIME 17.3*  --   --   --    INR 1.4  --   --   --    APTT 149.9*  --   --   --     < > = values in this interval not displayed. Chemistry:  Recent Labs     04/27/22 0600 04/28/22  1045 04/29/22  0600   NA   < >  --  138   K   < >  --  3.9  3.9   CL   < >  --  106   CO2   < >  --  22   GLUCOSE   < >  --  95   BUN   < >  --  34*   CREATININE   < > 1.7* 1.29*   MG   < >  --  2.0   ANIONGAP   < >  --  10   LABGLOM   < > 30* 41.5*   GFRAA   < > 37* 50.3*   CALCIUM   < >  --  8.2*   CAION  --  1.22  --    PHOS   < >  --  2.6   TROPONINI   < >  --  0.625*    < > = values in this interval not displayed. Urine Culture   Lab Results   Component Value Date    LABURIN No growth 24 hours 12/07/2020         Physical Examination:        Physical Exam   Colon catheter draining clear urine    Assessment:        Urinary retention  Active Hospital Problems    Diagnosis Date Noted    Abdominal pain, right upper quadrant [R10.11]      Priority: Medium    Abdominal pain [R10.9] 04/23/2022     Priority: Medium         Plan:        1. Recommend that patient be discharged to nursing facility/rehab facility with current Colon catheter  2.  No further recommendations      Electronically signed by Annette Tracey MD on 4/29/2022 at 1:17 PM

## 2022-04-29 NOTE — PROGRESS NOTES
CARDIOLOGY [address] PROGRESS NOTE         4/29/2022      Nuha Walker    [id number]  1955    Rounding MD: Stephanie Arriaza MD ,[address] [facility]    Primary Cardiologist: Mary Calderon MD [address] [place]      SUBJECTIVE:    Looks better overall. Alert and Extubated. BCs Positive, ID on case. Family present. Cardiac and general ROS otherwise negative and unchanged. ASSESSMENT:    1. Post recent Cholecystectomy  2. MS Changes / Encephalopathy  3. Shock, Septic probably primary, cardiogenic secondarily  4. CHB, Transient, now resolved. 5. NSTEMI  6. PHTN with RVE , RVSP 60  7. VHD, MR and TR, Moderate  8. CKD  9. Chest pain Hx, reproducible, musculoskeletal, noncardiac  10. CHF, Systolic, acute on chronic  11. CAD post CABG June 2019 [facility] AT [facility], [place] to [place], PCI to left circumflex and right coronary artery, last April 2020. Post catheterization April 2021, medical treatment advised. 12. Negative Myoview perfusion stress test, 8/2021. 13. Prior Ischemic cardiomyopathy with apical hypokinesia LVEF 35 to 40%. 14. PVOD post right lower extremity revascularization,  and [facility] Dr Servando Cespedes, (details not available), Right third toe amputation site infection. 15. Carotid artery disease with ultrasound noted 50-69% bilateral stenoses May 2019. 16. Hypothyroidism  17. Hypertension  18. Hyperlipidemia  19. Diabetes  20. Depression  21. Schizophrenia  22. Family history of coronary artery disease  21. Multiple allergies as noted. PLAN:    1. Supportive Intensive Care  2. IV Antibiotics  3. IV medications otherwise as tolerated, including IV metoprolol. 4. Serial troponins  5. Possible cardiac cath as warranted. 6. Further recommendations to follow  7.  See orders      OBJECTIVE:     MEDICATIONS:     Scheduled Meds:   furosemide  40 mg IntraVENous BID    potassium chloride  20 mEq Oral BID WC    lidocaine  5 mL IntraDERmal Once    sodium chloride flush  5-40 mL IntraVENous 2 times per day    vancomycin  750 mg IntraVENous Q24H    prazosin  1 mg Oral Nightly    fentanNYL  50 mcg IntraVENous Once    rocuronium  1 mg/kg IntraVENous Once    pantoprazole (PROTONIX) 40 mg injection  40 mg IntraVENous Daily    piperacillin-tazobactam  3,375 mg IntraVENous Q8H    sennosides-docusate sodium  2 tablet Oral BID    insulin lispro  0-6 Units SubCUTAneous 4 times per day    vancomycin (VANCOCIN) intermittent dosing (placeholder)   Other RX Placeholder    dextrose bolus (hypoglycemia)  250 mL IntraVENous Once    sodium chloride flush  5-40 mL IntraVENous 2 times per day    sertraline  200 mg Oral Daily    montelukast  10 mg Oral Nightly    [Held by provider] metoprolol succinate  50 mg Oral BID    levothyroxine  50 mcg Oral Daily    [Held by provider] isosorbide dinitrate  20 mg Oral TID    [Held by provider] haloperidol  5 mg Oral Daily    [Held by provider] doxepin  150 mg Oral Nightly    [Held by provider] busPIRone  10 mg Oral BID    [Held by provider] atorvastatin  80 mg Oral Nightly    polyvinyl alcohol  1 drop Both Eyes BID    [Held by provider] ranolazine  1,000 mg Oral BID    [Held by provider] meclizine  25 mg Oral BID    [Held by provider] hydrALAZINE  50 mg Oral 3 times per day    insulin glargine  40 Units SubCUTAneous Nightly     Continuous Infusions:   sodium chloride      sodium chloride      heparin (PORCINE) Infusion Stopped (04/29/22 1250)    sodium chloride      sodium chloride      dextrose       PRN Meds:sodium chloride flush, sodium chloride, atropine, heparin (porcine), heparin (porcine), dextrose, sodium chloride, sodium chloride flush, sodium chloride, ondansetron **OR** ondansetron, polyethylene glycol, acetaminophen **OR** acetaminophen, albuterol, albuterol sulfate HFA, hydrOXYzine, dicyclomine, glucose, glucagon (rDNA), dextrose    PHYSICAL EXAM:    CURRENT VITALS: BP (!) 128/109   Pulse 78   Temp 98.8 °F (37.1 °C) (Rectal)   Resp 11   Ht 5' 4\" (1.626 m) Wt 257 lb 15 oz (117 kg)   SpO2 100%   BMI 44.27 kg/m²     CONSTITUTIONAL:  awake, alert, cooperative, no apparent distress,   ENT:  Normocephalic, without obvious abnormality, atraumatic, sinuses nontender on palpation, external ears without lesions,  NECK:  Supple, symmetrical, trachea midline, no adenopathy, thyroid symmetric, not enlarged and no tenderness, skin normal  LUNGS:  No increased work of breathing, good air exchange, clear to auscultation bilaterally, no crackles or wheezing  CARDIOVASCULAR:  Normal apical impulse, regular rate and rhythm, normal S1 and S2,  and no murmur noted  ABDOMEN:  Normal bowel sounds, soft, non-distended, non-tender, no masses palpated, no hepatosplenomegally  EXTREMITIES:  No edema, Pulses strong throughout. NEUROLOGIC:  Awake, alert, oriented to name, place and time.  Following all commands and moving all extremties  SKIN:  no bruising or bleeding, normal skin color, texture, turgor and no rashes     Data:       LABS:  Recent Results (from the past 24 hour(s))   POCT Glucose    Collection Time: 04/28/22  5:52 PM   Result Value Ref Range    POC Glucose 186 (H) 70 - 99 mg/dl    Performed on ACCU-CHEK    Anti-Xa, Unfractionated Heparin    Collection Time: 04/28/22  8:00 PM   Result Value Ref Range    Anti-XA Unfrac Heparin 0.52 IU/mL   POCT Glucose    Collection Time: 04/29/22 12:13 AM   Result Value Ref Range    POC Glucose 160 (H) 70 - 99 mg/dl    Performed on ACCU-CHEK    Anti-Xa, Unfractionated Heparin    Collection Time: 04/29/22  2:00 AM   Result Value Ref Range    Anti-XA Unfrac Heparin 0.34 IU/mL   Comprehensive Metabolic Panel w/ Reflex to MG    Collection Time: 04/29/22  6:00 AM   Result Value Ref Range    Sodium 138 135 - 144 mEq/L    Potassium reflex Magnesium 3.9 3.4 - 4.9 mEq/L    Chloride 106 95 - 107 mEq/L    CO2 22 20 - 31 mEq/L    Anion Gap 10 9 - 15 mEq/L    Glucose 95 70 - 99 mg/dL    BUN 34 (H) 8 - 23 mg/dL    CREATININE 1.29 (H) 0.50 - 0.90 mg/dL GFR Non- 41.5 (L) >60    GFR  50.3 (L) >60    Calcium 8.2 (L) 8.5 - 9.9 mg/dL    Total Protein 6.1 (L) 6.3 - 8.0 g/dL    Albumin 2.9 (L) 3.5 - 4.6 g/dL    Total Bilirubin 0.5 0.2 - 0.7 mg/dL    Alkaline Phosphatase 88 40 - 130 U/L    ALT 18 0 - 33 U/L    AST 53 (H) 0 - 35 U/L    Globulin 3.2 2.3 - 3.5 g/dL   CBC with Auto Differential    Collection Time: 04/29/22  6:00 AM   Result Value Ref Range    WBC 10.4 4.8 - 10.8 K/uL    RBC 3.39 (L) 4.20 - 5.40 M/uL    Hemoglobin 8.5 (L) 12.0 - 16.0 g/dL    Hematocrit 25.2 (L) 37.0 - 47.0 %    MCV 74.2 (L) 82.0 - 100.0 fL    MCH 25.2 (L) 27.0 - 31.3 pg    MCHC 33.9 33.0 - 37.0 %    RDW 19.9 (H) 11.5 - 14.5 %    Platelets 682 838 - 576 K/uL    Neutrophils % 73.5 %    Lymphocytes % 13.8 %    Monocytes % 10.2 %    Eosinophils % 2.3 %    Basophils % 0.2 %    Neutrophils Absolute 7.6 (H) 1.4 - 6.5 K/uL    Lymphocytes Absolute 1.4 1.0 - 4.8 K/uL    Monocytes Absolute 1.1 (H) 0.2 - 0.8 K/uL    Eosinophils Absolute 0.2 0.0 - 0.7 K/uL    Basophils Absolute 0.0 0.0 - 0.2 K/uL   Magnesium    Collection Time: 04/29/22  6:00 AM   Result Value Ref Range    Magnesium 2.0 1.7 - 2.4 mg/dL   Renal Function Panel    Collection Time: 04/29/22  6:00 AM   Result Value Ref Range    Potassium 3.9 3.4 - 4.9 mEq/L    Phosphorus 2.6 2.3 - 4.8 mg/dL   Troponin    Collection Time: 04/29/22  6:00 AM   Result Value Ref Range    Troponin 0.625 (HH) 0.000 - 0.010 ng/mL   High sensitivity CRP    Collection Time: 04/29/22  6:00 AM   Result Value Ref Range    CRP High Sensitivity 101.8 (H) 0.0 - 5.0 mg/L   POCT Glucose    Collection Time: 04/29/22  6:52 AM   Result Value Ref Range    POC Glucose 107 (H) 70 - 99 mg/dl    Performed on ACCU-CHEK    Sedimentation Rate    Collection Time: 04/29/22  7:20 AM   Result Value Ref Range    Sed Rate 51 (H) 0 - 30 mm   EKG 12 Lead    Collection Time: 04/29/22  7:46 AM   Result Value Ref Range    Ventricular Rate 79 BPM    Atrial Rate 79 BPM P-R Interval 208 ms    QRS Duration 126 ms    Q-T Interval 414 ms    QTc Calculation (Bazett) 474 ms    P Axis 59 degrees    R Axis 19 degrees    T Axis 265 degrees   POCT Glucose    Collection Time: 04/29/22 12:09 PM   Result Value Ref Range    POC Glucose 173 (H) 70 - 99 mg/dl    Performed on ACCU-CHEK             EKG: Sinus bradycardia with 1st degree AV block   Nonspecific intraventricular conduction delay   ST & T wave abnormality, consider inferior ischemia   ST & T wave abnormality, consider anterolateral ischemia   Abnormal ECG     ECHO:   Left ventricular ejection fraction is visually estimated at 45-50%.    Near akinesis of septum, hypokinesis of septal aspect of apex and distal    most anteroseptal wall.    Overall LVEF seems a bit better than 8/2021    Right ventricle global systolic function is mildly reduced .    Moderately enlarged right ventricle cavity.    Right ventricular systolic pressure of 47 mm Hg consistent with moderate    pulmonary hypertension.    8/2021 RVSP was 60 mm Hg    Mildly dilated left atrium.    Markedly enlarged right atrium size.    Normal mitral valve structure    3+ MR    Normal aortic valve structure and function.    Normal tricuspid valve structure    Severe tricuspid regurgitation.              Cee Epperson MD ,[address] [facility]

## 2022-04-29 NOTE — PROGRESS NOTES
Physical Therapy Med Surg Initial Assessment  Facility/Department: [facility]  Room: [facility] [address]       NAME: Yue Bell  : 1955 (61 y.o.)  MRN: [id number]  CODE STATUS: Full Code    Date of Service: 2022    Patient Diagnosis(es): Abdominal pain, right upper quadrant [R10.11]  Abdominal pain [R10.9]  Intractable vomiting with nausea, unspecified vomiting type [R11.2]   Chief Complaint   Patient presents with    Abdominal Pain     n/v, Discharged from ER a couple days prior      Patient Active Problem List    Diagnosis Date Noted    Abdominal pain, right upper quadrant     Abdominal pain 2022    Depression     Acute decompensated heart failure ([address] 2021    History of angioplasty of peripheral vessel 2021    History of coronary artery bypass surgery 2021    Pneumonia 2021    CKD (chronic kidney disease) stage 3, GFR 30-59 ml/min ([facility])     Pain in right hand     Carpal tunnel syndrome 2021    NSTEMI (non-ST elevated myocardial infarction) ([address] 2021    Anesthesia of skin 2021    Paresthesia of skin 2021    Disorder of carotid artery ([address] 2021    Obesity (BMI 30-39.9) 2020    Heart failure, diastolic, with acute decompensation ([address] 2020    Nausea and vomiting     Abnormal gait 2020    Gangrene of toe ([address] 2020    Acute cerebrovascular accident ([address] 2020    Dizziness 2020    Acute on chronic combined systolic and diastolic congestive heart failure ([address] 2020    RADHA (acute kidney injury) ([address] 2020    Nonrheumatic mitral (valve) insufficiency 2020    Pulmonary hypertension ([address] 2020    Syncope and collapse 2020    Cellulitis 2020    Chest pain 2020    JESICA (obstructive sleep apnea)     Athscl native arteries of left leg w ulceration oth prt foot ([address] 2019    Rectal bleeding     Surgical wound dehiscence, initial encounter 2019    S/P amputation of lesser toe, right ([facility] 2019    Ischemic myocardial dysfunction 2019    Coronary arteriosclerosis 2019    Extrapyramidal disease 2019    Drug-induced hypotension 2019    Osteomyelitis of right foot ([address] 2019    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     Infection of skin 2019    Osteomyelitis ([address] 2019    Atherosclerotic PVD with intermittent claudication ([address] 2019    Peripheral vascular disease ([address] 2019    Non-pressure ulcer of toe ([facility] 2019    Asthma 2019    Anxiety 2019    Gastritis, unspecified, without bleeding 2019    Pure hypercholesterolemia 2019    Schizophrenia ([facility] 2019    Mild intermittent asthma without complication 15/15/2744    Type 2 diabetes mellitus with diabetic neuropathy, unspecified ([address] 2019    Major depressive disorder, single episode, unspecified 2019    Diabetes mellitus ([address] 2019    Hypertension 2019    HLD (hyperlipidemia) 2019    Thyroid disease 2019    Congestive heart failure ([address] 2019    Secondary diabetes mellitus ([address] 2019    Major depressive disorder, recurrent, severe with psychotic symptoms ([facility] 2019        Past Medical History:   Diagnosis Date    Asthma     CAD (coronary artery disease)     CKD (chronic kidney disease) stage 3, GFR 30-59 ml/min ([facility])     Colitis     Diabetes mellitus ([address]     Hyperlipidemia     Hypertension     PAD (peripheral artery disease) ([facility])     Prolonged emergence from general anesthesia     PVD (peripheral vascular disease) ([address]     Thyroid disease      Past Surgical History:   Procedure Laterality Date    CARDIAC SURGERY      CATARACT REMOVAL WITH IMPLANT Bilateral 11- 11-     SECTION      CHOLECYSTECTOMY, LAPAROSCOPIC N/A 2022    LAPAROSCOPIC CHOLECYSTECTOMY performed by Lonny Maravilla Mishel Ho MD at [address] 8/20/2019    COLONOSCOPY DIAGNOSTIC performed by Alonzo Crowe MD at [address] [facility] GRAFT  06/2019    unknown vessels    HYSTERECTOMY      TOE AMPUTATION Right     3rd toe       Chart Reviewed: Yes  Family / Caregiver Present: Yes  Other (Comment): Chava Molinasregoe utilized for [employer]. General Comment  Comments: Pt resting in bed - agreeable to PT evaluation    Restrictions:  Restrictions/Precautions: Fall Risk     SUBJECTIVE:   Subjective: \"I'm doing ok. \"    Pain  Pain: Pt reports pain throughout assessment. Unrated B LEs pain. Pt's RN notified. Prior Level of Function:  Social/Functional History  Lives With: Spouse  Type of Home: Apartment  Home Layout: One level (5th floor with elevator)  Home Access: Level entry  Home Equipment: Rollator  ADL Assistance:  (has supervision for self care)  [address]:  (aid 5 days/wk for laundry and cleaning services)  Ambulation Assistance: Independent (with rollator)  Transfer Assistance: Independent  Active : No    OBJECTIVE:   Vision  Vision: Within Functional Limits  Hearing: Within functional limits    Cognition:  Orientation Level: Oriented to person  Follows Commands: Within Functional Limits  Cognition Comment: Pt follows one step commnads with increased time    Observation/Palpation  Observation: No acute distress noted. Pt pleasant and motivated. Dyskinesia noted throughout face. Significant edema B hands.     ROM:  RLE PROM: WFL  RLE General PROM: Ankle DF to neutral  LLE PROM: WFL  LLE General PROM: Ankle DF to neutral    Strength:  Strength RLE  Comment: Grossly 1/5 at hip; 3-/5 at knee and 2/5 ankle  Strength LLE  Comment: Grossly 1/5 at hip; 3-/5 at knee and 2/5 at ankle  Strength Other  Other: Trunk strength grossly 1/5    Neuro:  Balance  Sitting - Static: Poor;+  Comments: Pt with delayed righting responses and persistend LOB posteriorly Tone: Normal    Sensation:  (chronic B LE neuropathy)    Bed mobility  Supine to Sit: Dependent/Total;2 Person assistance  Sit to Supine: 2 Person assistance;Dependent/Total  Scootin Person assistance;Dependent/Total  Comment: +3 assist for rolling. Pt requires hand over hand assist. Difficulty following for effective completion    Transfers  Comment: NT - safety concerns    Ambulation  Comments: Deferred              PT Exercises  Exercise Treatment: Education for ankle pumps and hand squeezes to decreased edema and risk for DVT. pt demonstrates with cues. Activity Tolerance  Activity Tolerance: Patient tolerated treatment well;Patient limited by fatigue    Patient Education  Education Given To: Patient; Family  Education Provided: Role of Therapy;Plan of Care  Education Method: ;Verbal;Demonstration  Education Outcome: Verbalized understanding;Continued education needed       ASSESSMENT:   Body Structures, Functions, Activity Limitations Requiring Skilled Therapeutic Intervention: Decreased functional mobility ; Decreased ADL status; Decreased ROM; Decreased body mechanics; Decreased strength;Decreased safe awareness;Decreased endurance;Decreased balance;Decreased coordination; Increased pain  Decision Making: Medium Complexity  History: High  Exam: Med  Clinical Presentation: High    Therapy Prognosis: Good  Barriers to Learning: language barrier         DISCHARGE RECOMMENDATIONS:  Discharge Recommendations: Continue to assess pending progress,Patient would benefit from continued therapy after discharge    Assessment: Continued PT indicated to progress mobility and facilitate DC at highest level of indep and safety. Rec therapy stay prior to DC home.   Requires PT Follow-Up: Yes      PLAN OF CARE:  Plan  Plan: 1 time a day 3-6 times a week  Current Treatment Recommendations: Strengthening,ROM,Balance training,Functional mobility training,Transfer training,Endurance training,Neuromuscular re-education,Home exercise program,Safety education & training,Patient/Caregiver education & training,Positioning,Modalities    Safety Devices  Type of Devices: All fall risk precautions in place  Restraints  Restraints Initially in Place: No    Goals:  Long Term Goals  Long term goal 1: Pt to complete rolling L<>R in supine with ModA  Long term goal 2: Pt to complete transitions sit<>supine with ModA  Long term goal 3: Pt to tolerate 15min activities sitting unsupported EOB with CGA    AMPAC (6 CLICK) BASIC MOBILITY  AM-PAC Inpatient Mobility Raw Score : 6     Therapy Time:   Individual   Time In 1331   Time Out 1346   Minutes [address] [place] [address], 04/29/22 at 2:59 PM         Definitions for assistance levels  Independent = pt does not require any physical supervision or assistance from another person for activity completion. Device may be needed.   Stand by assistance = pt requires verbal cues or instructions from another person, close to but not touching, to perform the activity  Minimal assistance= pt performs 75% or more of the activity; assistance is required to complete the activity  Moderate assistance= pt performs 50% of the activity; assistance is required to complete the activity  Maximal assistance = pt performs 25% of the activity; assistance is required to complete the activity  Dependent = pt requires total physical assistance to accomplish the task

## 2022-04-29 NOTE — PROGRESS NOTES
Comprehensive Nutrition Assessment    Type and Reason for Visit:  Reassess    Nutrition Recommendations/Plan:   1. Carb Control added to soft and bite sized diet, due to history of DM  2. Added Diabetic oral supplement ( Glucerna shake) to trays , until po > 50% of meals     Malnutrition Assessment:  Malnutrition Status:  Insufficient data (04/27/22 1527)    Context:  Acute Illness     Findings of the 6 clinical characteristics of malnutrition:  Energy Intake:  Unable to assess  Weight Loss:  Unable to assess     Body Fat Loss:  Unable to assess     Muscle Mass Loss:  Unable to assess    Fluid Accumulation:  Unable to assess     Strength:  Not Performed    Nutrition Assessment:    Pt remains at risk for malnutrition , in the context of acute illness, extubated yesterday and advanced to an oral diet, appetite and intaka have been poor    Nutrition Related Findings:    admitted 4/2 with cholecystits, lap heather 4/25, developed abcess, intubated 4/27, tophic TF < 24 hrs, BM 4/22 noted ( senekot s), 2+ pitting BUE edema per nursing wtih jaundice noted,glucose controlled Wound Type: None       Current Nutrition Intake & Therapies:    Average Meal Intake: 1-25%  Average Supplements Intake: None Ordered  ADULT DIET; Dysphagia - Soft and Bite Sized; 4 carb choices (60 gm/meal)    Anthropometric Measures:  Height: 5' 4\" (162.6 cm)  Ideal Body Weight (IBW): 120 lbs (55 kg)    Admission Body Weight: 225 lb (102.1 kg) (stated)  Current Body Weight: 257 lb (116.6 kg) (4/29), 245# ( 4/28) * edema present), 193.3 % .  Weight Source: Bed Scale  Current BMI (kg/m2): 44.1  Usual Body Weight: 241 lb (109.3 kg) (8/15/21-office visit)  % Weight Change (Calculated): -3.7  BMI Categories: Obese Class 3 (BMI 40.0 or greater) (UBW BMI ~ 41)    Estimated Daily Nutrient Needs:  Energy Requirements Based On: Kcal/kg  Weight Used for Energy Requirements: Usual  Energy (kcal/day): 4210-5250 kcals @ 10-12 kcal/kg  Weight Used for Protein Requirements: Ideal  Protein (g/day): 109 gm  Method Used for Fluid Requirements: 1 ml/kcal  Fluid (ml/day): ~1200 ml or as per MD    Nutrition Diagnosis:   · Inadequate oral intake related to other (comment) (illness) as evidenced by intake 0-25%      Nutrition Interventions:   Food and/or Nutrient Delivery: Modify Current Diet,Start Oral Nutrition Supplement  Nutrition Education/Counseling: Education not indicated    Goals:  Previous Goal Met: Progress towards Goal(s) Declining  Goals: Meet at least 75% of estimated needs      Nutrition Monitoring and Evaluation:   Behavioral-Environmental Outcomes: None Identified  Food/Nutrient Intake Outcomes: Diet Advancement/Tolerance,Food and Nutrient Intake,Supplement Intake  Physical Signs/Symptoms Outcomes: Weight,GI Status,Biochemical Data,Meal Time Behavior    Discharge Planning:     Too soon to determine     Raheem Kovacs RD, LD

## 2022-04-29 NOTE — PROGRESS NOTES
PHARMACY NOTE:   Interdisciplinary Rounds Completed     ICU Day #2     Changes made today by Pharmacy:   No medication changes made today by pharmacy during interdisciplinary care rounds       Additional information:   Remains on heparin drip for possible PE   Insulin coverage: low dose sliding scale with Humalog, utilized 2 units per sliding scale over the past 24 hours + Lantus 40 units QHS   Pressors: norepinephrine OFF since this morning  Antimicrobial therapy, day #3: Zosyn 3.375gm IV Q8 hours, per extended infusion protocol                                       Day #3: vancomycin, pharmacy to dose, 750mg IV Q24 hours, level due 04/30/22 @ 0600                                       Infectious Disease Services following patient. Cultures positive for gram positive cocci in clusters, resembling staph.     Core measures assessed/met    Shanti Velazquez, KourtneyD, BCPS   4/29/2022 11:49 AM

## 2022-04-29 NOTE — FLOWSHEET NOTE
Shift summary    0715 report at bedside. Pt repositioned, skin inspected. Dark blanchable area to buttocks, zinc cream applied, no breakdown noted. mepliex to heels and sacrum. vss off of levo will monitor. O2 @ 4L weaned to 3L. LA drain compressed-    [id number] assessment complete see flowsheet. A&ox4, calm, follows commands, WILKINSON. MP SR. PPP, generalized edema noted especially to arms and feet. LS c/dim on 3L sats stable no cough or SOB noted. abd s/slight tenderness to touch bs + x 4 but hypoactive. Colon CD yellow urine. No BM yet. CVC intact L and R neck, a line zeroed/leveled. meds per [facility]     0930 ICU rounds done, see orders. 1100 Dr. Tobias Kent here, updated, see orders. Attempt x 2 for IV placement, pt edematous, difficult stick, order received for PICC line. 36 Dr. Melisa Porter here, updated. Colon to remain in- placed for retention. 1300 consent obtained for PICC line from pt daughter. Heparin gtt stopped at this time per specials recommendation of holding x 4 hours. Rene Edmond NP aware. Pt diuresing well after lasix. Electronically signed by Meryl Altamirano RN on 4/29/2022 at 1:19 PM     1400 PT/OT here to see pt.     1600 resting, vss, no needs. Lupe MARSH took pt art line out as pt can transfer out of ICU.     1700 pt to specials, on monitor, this RN not accompanying as pt cleared to transfer out of ICU.     [address] Dr. Dulce Rodriguez here, updated. See orders. [id number] pt back from specials. C/o abd pain, needing to poop. Placed on bedpan. meds per MAR. I/o's done. Lab here for blood cultures. Will remove lines once pt done on bedpan/lab is done. Pt sons in room, updated on new room and phone number. 1915 report to [facility] at bedside.  Electronically signed by Meryl Altamirano RN on 4/29/2022 at 7:35 PM

## 2022-04-30 NOTE — PROGRESS NOTES
Report given from [facility] day shift lab in room for blood cultures pt family  at bedside. Lines removed per order pt tolerated well no complications noted. Heparin restarted at 7.5 units /kg /hr  XA due 0200. Report called to Lul Childers pt going to room 174 family at bedside.

## 2022-04-30 NOTE — PROGRESS NOTES
[address] [facility] Pharmacokinetic Monitoring Service - Vancomycin    Consulting Provider: Cristel Can CNP   Indication: sepsis   Target Concentration: Goal AUC/ROBEL 400-600 mg*hr/L  Day of Therapy: 4  Additional Antimicrobials: zosyn    Pertinent Laboratory Values: Wt Readings from Last 1 Encounters:   04/30/22 253 lb 4.8 oz (114.9 kg)     Temp Readings from Last 1 Encounters:   04/30/22 99.1 °F (37.3 °C) (Oral)     Estimated Creatinine Clearance: 65 mL/min (A) (based on SCr of 1.08 mg/dL (H)). Recent Labs     04/29/22  0600 04/30/22  0558 04/30/22  0600   CREATININE 1.29*  --  1.08*   WBC 10.4 11.2*  --      Procalcitonin: 0.33ng/mL on 4/27/22     ertinent Cultures:  Culture Date Source Results   04/27/22  Repeated: 4/29/22 Blood  Coagulase negative staph    04/27/22 Urine  Now growth   04/27/22 Respiratory  Now growth       MRSA Nasal Swab: N/A. Non-respiratory infection. Recent vancomycin administrations                     vancomycin (VANCOCIN) 750 mg in dextrose 5 % 250 mL IVPB (mg) 750 mg New Bag 04/28/22 2032    vancomycin 1000 mg IVPB in 250 mL D5W addavial ()  Restarted 04/27/22 1407     1,000 mg New Bag  1406                    Assessment:  Date/Time Current Dose Concentration Timing of Concentration (h) AUC   04/30/22 750mg IV Q24 hours  10.2mg/L on 04/3022 @ 0600 ~10 hours post dose  Predicted: 281mg/L.hr    Note: Serum concentrations collected for AUC dosing may appear elevated if collected in close proximity to the dose administered, this is not necessarily an indication of toxicity    Plan:  Current dosing regimen is sub-therapeutic  Increase dose to vancomycin 1250mg IV Q24 hours     Repeat vancomycin concentration ordered for 05/02/22 @ 0600   Pharmacy will continue to monitor patient and adjust therapy as indicated  [facility] Rx updated and pharmacokinetic analysis predicts the following: AUC of 454mg/L.hr (and trough of 15.2mg/L at steady state.      Thank you for the consult,    Olinda Mccabe, PharmD, BCPS   4/30/2022 11:22 AM

## 2022-04-30 NOTE — PROGRESS NOTES
CARDIOLOGY [address] PROGRESS NOTE         4/30/2022      Leann Salguero    [id number]  1955    Rounding MD: Ashely Obrien MD ,[facility]    Primary Cardiologist: Nico Norris MD [address] [place]      SUBJECTIVE:    Looks better overall. Has abdominal discomfort. No CP. BCs Positive, ID on case. Family present. Cardiac and general ROS otherwise negative and unchanged. ASSESSMENT:    1. Post recent Cholecystectomy  2. MS Changes / Encephalopathy  3. Shock, Septic probably primary, cardiogenic secondarily  4. CHB, Transient, now resolved. 5. NSTEMI  6. PHTN with RVE , RVSP 60  7. VHD, MR and TR, Moderate  8. CKD  9. Chest pain Hx, reproducible, musculoskeletal, noncardiac  10. CHF, Systolic, acute on chronic  11. CAD post CABG June 2019 [facility] [place] to [place], PCI to left circumflex and right coronary artery, last April 2020. Post catheterization April 2021, medical treatment advised. 12. Negative Myoview perfusion stress test, 8/2021. 13. Prior Ischemic cardiomyopathy with apical hypokinesia LVEF 35 to 40%. 14. PVOD post right lower extremity revascularization,  and [facility] Dr Theresa Vincent, (details not available), Right third toe amputation site infection. 15. Carotid artery disease with ultrasound noted 50-69% bilateral stenoses May 2019. 16. Hypothyroidism  17. Hypertension  18. Hyperlipidemia  19. Diabetes  20. Depression  21. Schizophrenia  22. Family history of coronary artery disease  21. Multiple allergies as noted. PLAN:    1. Supportive Cardiac Care  2. IV Antibiotics. ID care  3. IV medications otherwise as tolerated, including IV metoprolol, advance to home PO medications once able. 4. Further recommendations to follow  5.  See orders      OBJECTIVE:     MEDICATIONS:     Scheduled Meds:   miconazole   Topical BID    lactulose  30 g Oral TID    furosemide  40 mg IntraVENous BID    potassium chloride  20 mEq Oral BID WC    sodium chloride flush  5-40 mL IntraVENous 2 times per day    meropenem  1,000 mg IntraVENous Q8H    vancomycin  750 mg IntraVENous Q24H    prazosin  1 mg Oral Nightly    fentanNYL  50 mcg IntraVENous Once    rocuronium  1 mg/kg IntraVENous Once    pantoprazole (PROTONIX) 40 mg injection  40 mg IntraVENous Daily    sennosides-docusate sodium  2 tablet Oral BID    insulin lispro  0-6 Units SubCUTAneous 4 times per day    vancomycin (VANCOCIN) intermittent dosing (placeholder)   Other RX Placeholder    dextrose bolus (hypoglycemia)  250 mL IntraVENous Once    sodium chloride flush  5-40 mL IntraVENous 2 times per day    sertraline  200 mg Oral Daily    montelukast  10 mg Oral Nightly    [Held by provider] metoprolol succinate  50 mg Oral BID    levothyroxine  50 mcg Oral Daily    [Held by provider] isosorbide dinitrate  20 mg Oral TID    [Held by provider] haloperidol  5 mg Oral Daily    [Held by provider] doxepin  150 mg Oral Nightly    [Held by provider] busPIRone  10 mg Oral BID    [Held by provider] atorvastatin  80 mg Oral Nightly    polyvinyl alcohol  1 drop Both Eyes BID    [Held by provider] ranolazine  1,000 mg Oral BID    [Held by provider] meclizine  25 mg Oral BID    [Held by provider] hydrALAZINE  50 mg Oral 3 times per day    insulin glargine  40 Units SubCUTAneous Nightly     Continuous Infusions:   heparin (PORCINE) Infusion 9.5 Units/kg/hr (04/30/22 0423)    sodium chloride      sodium chloride 25 mL (04/29/22 1850)    sodium chloride      dextrose       PRN Meds:morphine, sodium chloride flush, sodium chloride, atropine, heparin (porcine), heparin (porcine), dextrose, sodium chloride, sodium chloride flush, sodium chloride, ondansetron **OR** ondansetron, polyethylene glycol, acetaminophen **OR** acetaminophen, albuterol, albuterol sulfate HFA, hydrOXYzine, dicyclomine, glucose, glucagon (rDNA), dextrose    PHYSICAL EXAM:    CURRENT VITALS: BP (!) 172/140   Pulse 105   Temp 99.1 °F (37.3 °C) (Oral) Resp 18   Ht 5' 4\" (1.626 m)   Wt 253 lb 4.8 oz (114.9 kg)   SpO2 100%   BMI 43.48 kg/m²     CONSTITUTIONAL:  awake, alert, cooperative, no apparent distress,   ENT:  Normocephalic, without obvious abnormality, atraumatic, sinuses nontender on palpation, external ears without lesions,  NECK:  Supple, symmetrical, trachea midline, no adenopathy, thyroid symmetric, not enlarged and no tenderness, skin normal  LUNGS:  No increased work of breathing, good air exchange, clear to auscultation bilaterally, no crackles or wheezing  CARDIOVASCULAR:  Normal apical impulse, regular rate and rhythm, normal S1 and S2,  and no murmur noted  ABDOMEN:  Normal bowel sounds, soft, non-distended, non-tender, no masses palpated, no hepatosplenomegally  EXTREMITIES:  No edema, Pulses strong throughout. NEUROLOGIC:  Awake, alert, oriented to name, place and time.  Following all commands and moving all extremties  SKIN:  no bruising or bleeding, normal skin color, texture, turgor and no rashes     Data:       LABS:  Recent Results (from the past 24 hour(s))   POCT Glucose    Collection Time: 04/29/22 12:09 PM   Result Value Ref Range    POC Glucose 173 (H) 70 - 99 mg/dl    Performed on ACCU-CHEK    POCT Glucose    Collection Time: 04/29/22 11:14 PM   Result Value Ref Range    POC Glucose 154 (H) 70 - 99 mg/dl    Performed on ACCU-CHEK    Anti-Xa, Unfractionated Heparin    Collection Time: 04/30/22  2:30 AM   Result Value Ref Range    Anti-XA Unfrac Heparin 0.10 IU/mL   CBC with Auto Differential    Collection Time: 04/30/22  5:58 AM   Result Value Ref Range    WBC 11.2 (H) 4.8 - 10.8 K/uL    RBC 3.16 (L) 4.20 - 5.40 M/uL    Hemoglobin 7.8 (L) 12.0 - 16.0 g/dL    Hematocrit 23.6 (L) 37.0 - 47.0 %    MCV 74.7 (L) 82.0 - 100.0 fL    MCH 24.8 (L) 27.0 - 31.3 pg    MCHC 33.2 33.0 - 37.0 %    RDW 19.8 (H) 11.5 - 14.5 %    Platelets 171 084 - 712 K/uL    Neutrophils % 79.4 %    Lymphocytes % 9.9 %    Monocytes % 9.1 %    Eosinophils % 1.2 %    Basophils % 0.4 %    Neutrophils Absolute 8.9 (H) 1.4 - 6.5 K/uL    Lymphocytes Absolute 1.1 1.0 - 4.8 K/uL    Monocytes Absolute 1.0 (H) 0.2 - 0.8 K/uL    Eosinophils Absolute 0.1 0.0 - 0.7 K/uL    Basophils Absolute 0.0 0.0 - 0.2 K/uL   Comprehensive Metabolic Panel w/ Reflex to MG    Collection Time: 04/30/22  6:00 AM   Result Value Ref Range    Sodium 139 135 - 144 mEq/L    Potassium reflex Magnesium 3.9 3.4 - 4.9 mEq/L    Chloride 103 95 - 107 mEq/L    CO2 21 20 - 31 mEq/L    Anion Gap 15 9 - 15 mEq/L    Glucose 159 (H) 70 - 99 mg/dL    BUN 27 (H) 8 - 23 mg/dL    CREATININE 1.08 (H) 0.50 - 0.90 mg/dL    GFR Non- 51.0 (L) >60    GFR  >60.0 >60    Calcium 8.2 (L) 8.5 - 9.9 mg/dL    Total Protein 6.1 (L) 6.3 - 8.0 g/dL    Albumin 2.9 (L) 3.5 - 4.6 g/dL    Total Bilirubin 0.6 0.2 - 0.7 mg/dL    Alkaline Phosphatase 86 40 - 130 U/L    ALT 16 0 - 33 U/L    AST 41 (H) 0 - 35 U/L    Globulin 3.2 2.3 - 3.5 g/dL   Magnesium    Collection Time: 04/30/22  6:00 AM   Result Value Ref Range    Magnesium 1.6 (L) 1.7 - 2.4 mg/dL   Renal Function Panel    Collection Time: 04/30/22  6:00 AM   Result Value Ref Range    Potassium 3.9 3.4 - 4.9 mEq/L    Phosphorus 2.5 2.3 - 4.8 mg/dL   Vancomycin Level, Random    Collection Time: 04/30/22  6:00 AM   Result Value Ref Range    Vancomycin Rm 10.2 10.0 - 40.0 ug/mL   POCT Glucose    Collection Time: 04/30/22  6:04 AM   Result Value Ref Range    POC Glucose 133 (H) 70 - 99 mg/dl    Performed on ACCU-CHEK    POCT Glucose    Collection Time: 04/30/22  7:05 AM   Result Value Ref Range    POC Glucose 152 (H) 70 - 99 mg/dl    Performed on ACCU-CHEK             EKG:    Sinus bradycardia with 1st degree AV block   Nonspecific intraventricular conduction delay   ST & T wave abnormality, consider inferior ischemia   ST & T wave abnormality, consider anterolateral ischemia   Abnormal ECG     ECHO:   Left ventricular ejection fraction is visually estimated at 45-50%.    Near akinesis of septum, hypokinesis of septal aspect of apex and distal    most anteroseptal wall.    Overall LVEF seems a bit better than 8/2021    Right ventricle global systolic function is mildly reduced .    Moderately enlarged right ventricle cavity.    Right ventricular systolic pressure of 47 mm Hg consistent with moderate    pulmonary hypertension.    8/2021 RVSP was 60 mm Hg    Mildly dilated left atrium.    Markedly enlarged right atrium size.    Normal mitral valve structure    3+ MR    Normal aortic valve structure and function.    Normal tricuspid valve structure    Severe tricuspid regurgitation.              Cee Epperson MD ,[facility]

## 2022-04-30 NOTE — PROGRESS NOTES
Progress Note  Date:2022       [id number]  Patient Thelma Herbert     YOB: 1955     Age:66 y.o.    ROS: Point review system cannot be obtained due to acuity of medical condition. Subjective      Objective         Vitals Last 24 Hours:  TEMPERATURE:  Temp  Av.3 °F (36.8 °C)  Min: 97.7 °F (36.5 °C)  Max: 99.1 °F (37.3 °C)  RESPIRATIONS RANGE: Resp  Avg: 15.1  Min: 10  Max: 27  PULSE OXIMETRY RANGE: SpO2  Av %  Min: 99 %  Max: 100 %  PULSE RANGE: Pulse  Av.7  Min: 75  Max: 105  BLOOD PRESSURE RANGE: Systolic (58JBL), QXY:260 , Min:123 , WJW:543   ; Diastolic (96JMR), MCM:04, Min:64, Max:140    I/O (24Hr): Intake/Output Summary (Last 24 hours) at 2022 1009  Last data filed at 2022 0438  Gross per 24 hour   Intake 460.18 ml   Output 1450 ml   Net -989.82 ml     Objective:  General Appearance:  Ill-appearing. Vital signs: (most recent): Blood pressure (!) 172/140, pulse 105, temperature 99.1 °F (37.3 °C), temperature source Oral, resp. rate 18, height 5' 4\" (1.626 m), weight 253 lb 4.8 oz (114.9 kg), SpO2 100 %, not currently breastfeeding. HEENT: Normal HEENT exam.    Heart: S1 normal and S2 normal.    Abdomen: Abdomen is soft. Bowel sounds are normal.   There is no abdominal tenderness. Pulses: Distal pulses are intact. Neurological: (Sedated, minimal responsive). Pupils:  Pupils are equal, round, and reactive to light. Skin:  Warm and dry. Labs/Imaging/Diagnostics    Labs:  CBC:  Recent Labs     22  0558 22  0558 22  1045 22  0600 22  0558   WBC 8.5  --   --  10.4 11.2*   RBC 3.09*  --   --  3.39* 3.16*   HGB 7.7*   < > 9.7* 8.5* 7.8*   HCT 23.1*  --   --  25.2* 23.6*   MCV 74.7*  --   --  74.2* 74.7*   RDW 19.0*  --   --  19.9* 19.8*     --   --  174 160    < > = values in this interval not displayed.      CHEMISTRIES:  Recent Labs     22  0450 22  0557 22  0557 04/28/22  1045 04/29/22  0600 04/30/22  0600   NA  --  136  --   --  138 139   K  --  3.5   < >  --  3.9  3.9 3.9  3.9   CL  --  106  --   --  106 103   CO2  --  20  --   --  22 21   BUN  --  45*  --   --  34* 27*   CREATININE   < > 1.75*  --  1.7* 1.29* 1.08*   GLUCOSE  --  121*  --   --  95 159*   PHOS  --  2.2*  --   --  2.6 2.5   MG  --  2.2  --   --  2.0 1.6*    < > = values in this interval not displayed. PT/INR:  Recent Labs     04/27/22  1017 04/27/22 2034   PROTIME 16.4* 17.3*   INR 1.3 1.4     APTT:  Recent Labs     04/27/22 2034   APTT 149.9*     LIVER PROFILE:  Recent Labs     04/28/22  0557 04/29/22  0600 04/30/22  0600   AST 58* 53* 41*   ALT 15 18 16   BILITOT 0.4 0.5 0.6   ALKPHOS 75 88 86       Imaging Last 24 Hours:  CT ABDOMEN PELVIS WO CONTRAST Additional Contrast? None    Result Date: 4/23/2022  EXAM:  CT ABDOMEN PELVIS WO CONTRAST History: Abdominal pain Technique: Multiple contiguous axial images were obtained of the abdomen and pelvis from the level of the lung bases through the ischial tuberosities without contrast. Multiplanar reformats were obtained. Comparison: CT abdomen pelvis April 20, 2022 Findings: Lung bases are clear. Heart size is enlarged. Mitral annular calcification. Evidence of prior thoracic surgery. Lack of intravenous contrast precludes optimal evaluation of the abdominal and pelvic viscera. The unenhanced liver, spleen, stomach, pancreas, and adrenal glands appear within normal limits. The gallbladder is mildly distended but not significant changed from recent CT. There are debris is present within the gallbladder lumen. No gallbladder wall thickening or pericholecystic fluid identified by CT. Mild bilateral perinephric stranding. No urinary tract calculi or hydronephrosis. The urinary bladder is decompressed. The uterus is absent. Abdominal aorta is nonaneurysmal  . No retroperitoneal or abdominal/pelvic lymphadenopathy. No small bowel obstruction.  No overt colonic mass or pericolonic inflammation. No findings of acute appendicitis No free fluid, loculated fluid collection, or pneumoperitoneum. No acute osseous abnormality. Mildly distended gallbladder containing gallbladder sludge and/or tiny gallstones without significant interval change since April 20, 2022 CT. No CT findings of acute cholecystitis. Mild bilateral perinephric stranding is nonspecific. Correlation for polynephritis is recommended. All CT scans at this facility use dose modulation, iterative reconstruction, and/or weight based dosing when appropriate to reduce radiation dose to as low as reasonably achievable. US ABDOMEN LIMITED    Result Date: 4/23/2022  EXAM: US ABDOMEN LIMITED HISTORY: Right upper quadrant pain TECHNIQUE: Ultrasound evaluation was performed of the right upper quadrant of the abdomen. COMPARISON: CT abdomen pelvis April 23, 2022 FINDINGS: Normal echogenicity and contour of the liver. No liver lesion or intrahepatic biliary dilatation. Main portal vein is patent. The gallbladder is mildly distended. Layering echogenic material is identified within the lumen of the gallbladder. No pericholecystic fluid. Gallbladder wall thickness is at the upper limits of normal measuring 2.7 mm. Negative Velez sign reported. Common bile duct is normal in diameter measuring 4 mm. No overt abnormality of the pancreas. Nonspecific findings of the gallbladder including mildly distended gallbladder containing layering sludge and/or tiny gallstones with wall thickness of the upper limits of normal measuring 2.7 mm. No pericholecystic fluid to suggest acute cholecystitis.      Assessment//Plan           Hospital Problems           Last Modified POA    * (Principal) Abdominal pain 4/23/2022 Yes    Abdominal pain, right upper quadrant 4/25/2022 Yes    Shock ([address] 4/29/2022 Yes    Gram-positive bacteremia 4/29/2022 Yes    Acute cholecystitis 4/29/2022 Yes    Acute kidney injury superimposed on CKD ([facility] 4/29/2022 Yes      acute heather  CKD 3  Diabetes  CAD  RADHA on CKD  Hyperkalemia  Shock  Gram positive septicemia  sepsis  Assessment & Plan    4/24: Tegretol and aspirin for now as per surgery recommendation. For surgery for tomorrow. Sugars better controlled. She has right upper quadrant pain. Possible cholecystectomy tomorrow. 4/25: Patient is going for lap heather today. Anticipate discharge tomorrow. No overnight events no new complaints. Continue current care.hold nephrotoxic agents, cw IVF.  4/26: Acute urinary retention s/p Colon, urology evaluation, renal function is worsening we will get nephrology evaluation, treat hyperkalemia. Spoke with nursing. 4/27: He upon entering room patient is lethargic, unable to arouse. Spoke with the  at bedside. Blood pressure is low, used  to speak with them. Transfer patient to ICU for hypotension/shock Patient was started on Flomax yesterday. We will get psych eval for polypharmacy. Patient mental status and condition is to be changed from yesterday. Spoke with cardiac critical care team about the care plan  CC time was 45 min  4/28: Spoke with the daughter-in-law, patient intubated and is on pressors. Taper down pressors as tolerated. Patient is on heparin drip for possible PE, renal function is improving. Questionable complete heart block. Follow cardiology, continue IV antibiotic for gram-positive septicemia with multiorgan failure,  4/29: Patient was extubated, spoke with the daughter at bedside. Continue current care. Patient is more stable compared to before. Follow-up ID continue with IV antibiotics, family would like SNF placement. PT OT ordered. 4/30: Patient complains of constipation, order lactulose, abdominal pain KUB. CT angio to rule out PE , IF PE is negative. DC heparin drip. Spoke with nursing about the care.   Electronically signed by Fco Castelan MD on 4/24/22 at 11:41 AM EDT    Subjective:  Symptoms:  She reports malaise and weakness. No shortness of breath, cough, chest pain, headache, chest pressure, anorexia, diarrhea or anxiety. Diet:  No vomiting. Review of Systems   Respiratory: Negative for cough and shortness of breath. Cardiovascular: Negative for chest pain. Gastrointestinal: Negative for anorexia, diarrhea and vomiting. Neurological: Positive for weakness.

## 2022-04-30 NOTE — FLOWSHEET NOTE
Pt resting in bed. Family at bedside. Pt moaning and is restless. Pt understands minimal English. Per family, pt is not nauseous but is having abdominal pain. Pt is also complaining of being unable to have a bm. Nurse changed the edward dsg. No active drainage noted from around the tube. Nurse attempted to administer a fleets enema but there was too much stool in the pts rectum. Nurse disimpacted the pt for moderate amount of hard stool. There was some blood noted from the rectum. Pt expelled the remaining fleets fluid and no more stool. Pt voiced some relief of abd discomfort. Pt repositioned for comfort.

## 2022-04-30 NOTE — PROGRESS NOTES
Nephrology Progress Note    Assessment:  RADHA improving with hydration  Watch BP  Anemia  COPD  OHD CHF    Plan:maintain evolemia follow labs  CT today    Patient Active Problem List:     Major depressive disorder, recurrent, severe with psychotic symptoms ([address]     Diabetes mellitus ([address]     Hypertension     HLD (hyperlipidemia)     Thyroid disease     Congestive heart failure (HCC)     Non-pressure ulcer of toe (HCC)     Atherosclerotic PVD with intermittent claudication (HCC)     Osteomyelitis (HCC)     Infection of skin     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     Surgical wound dehiscence, initial encounter     S/P amputation of lesser toe, right (HCC)     Rectal bleeding     Athscl native arteries of left leg w ulceration oth prt foot ([facility])     JESICA (obstructive sleep apnea)     Secondary diabetes mellitus ([facility])     Chest pain     Peripheral vascular disease ([facility])     Cellulitis     Syncope and collapse     Nonrheumatic mitral (valve) insufficiency     Ischemic myocardial dysfunction     Pulmonary hypertension (HCC)     Acute on chronic combined systolic and diastolic congestive heart failure ([facility])     Asthma     Acute kidney injury superimposed on CKD (HCC)     Dizziness     Acute cerebrovascular accident ([address]     Abnormal gait     Anxiety     Gastritis, unspecified, without bleeding     Pure hypercholesterolemia     Schizophrenia ([facility]     Coronary arteriosclerosis     Extrapyramidal disease     Gangrene of toe (HCC)     Drug-induced hypotension     Osteomyelitis of right foot (HCC)     Nausea and vomiting     Mild intermittent asthma without complication     Heart failure, diastolic, with acute decompensation ([facility])     Major depressive disorder, single episode, unspecified     Type 2 diabetes mellitus with diabetic neuropathy, unspecified (HCC)     Obesity (BMI 30-39. 9)     Disorder of carotid artery ([facility])     NSTEMI (non-ST elevated myocardial infarction) ([address]     Pneumonia     CKD (chronic kidney disease) stage 3, GFR 30-59 ml/min ([facility])     Pain in right hand     Anesthesia of skin     Carpal tunnel syndrome     History of angioplasty of peripheral vessel     History of coronary artery bypass surgery     Paresthesia of skin     Acute decompensated heart failure (HCC)     Depression     Abdominal pain     Abdominal pain, right upper quadrant     Shock ([address]     Gram-positive bacteremia     Acute cholecystitis      Subjective:  Admit Date: 4/22/2022    Interval History: same    Medications:  Scheduled Meds:   miconazole   Topical BID    lactulose  30 g Oral TID    furosemide  40 mg IntraVENous BID    potassium chloride  20 mEq Oral BID WC    sodium chloride flush  5-40 mL IntraVENous 2 times per day    meropenem  1,000 mg IntraVENous Q8H    vancomycin  750 mg IntraVENous Q24H    prazosin  1 mg Oral Nightly    fentanNYL  50 mcg IntraVENous Once    rocuronium  1 mg/kg IntraVENous Once    pantoprazole (PROTONIX) 40 mg injection  40 mg IntraVENous Daily    sennosides-docusate sodium  2 tablet Oral BID    insulin lispro  0-6 Units SubCUTAneous 4 times per day    vancomycin (VANCOCIN) intermittent dosing (placeholder)   Other RX Placeholder    dextrose bolus (hypoglycemia)  250 mL IntraVENous Once    sodium chloride flush  5-40 mL IntraVENous 2 times per day    sertraline  200 mg Oral Daily    montelukast  10 mg Oral Nightly    [Held by provider] metoprolol succinate  50 mg Oral BID    levothyroxine  50 mcg Oral Daily    [Held by provider] isosorbide dinitrate  20 mg Oral TID    [Held by provider] haloperidol  5 mg Oral Daily    [Held by provider] doxepin  150 mg Oral Nightly    [Held by provider] busPIRone  10 mg Oral BID    [Held by provider] atorvastatin  80 mg Oral Nightly    polyvinyl alcohol  1 drop Both Eyes BID    [Held by provider] ranolazine  1,000 mg Oral BID    [Held by provider] meclizine  25 mg Oral BID    [Held by provider] hydrALAZINE  50 mg Oral 3 times per day    insulin glargine  40 Units SubCUTAneous Nightly     Continuous Infusions:   heparin (PORCINE) Infusion 9.5 Units/kg/hr (04/30/22 0423)    sodium chloride      sodium chloride 25 mL (04/29/22 1850)    sodium chloride      dextrose         CBC:   Recent Labs     04/29/22  0600 04/30/22  0558   WBC 10.4 11.2*   HGB 8.5* 7.8*    160     CMP:    Recent Labs     04/28/22  0557 04/28/22  0557 04/28/22  1045 04/29/22  0600 04/30/22  0600     --   --  138 139   K 3.5   < >  --  3.9  3.9 3.9  3.9     --   --  106 103   CO2 20  --   --  22 21   BUN 45*  --   --  34* 27*   CREATININE 1.75*   < > 1.7* 1.29* 1.08*   GLUCOSE 121*  --   --  95 159*   CALCIUM 7.8*  --   --  8.2* 8.2*   LABGLOM 29.2*   < > 30* 41.5* 51.0*    < > = values in this interval not displayed. Troponin:   Recent Labs     04/29/22  0600   TROPONINI 0.625*     BNP: No results for input(s): BNP in the last 72 hours. INR:   Recent Labs     04/27/22 2034   INR 1.4     Lipids: No results for input(s): CHOL, LDLDIRECT, TRIG, HDL, AMYLASE, LIPASE in the last 72 hours. Liver:   Recent Labs     04/30/22 0600   AST 41*   ALT 16   ALKPHOS 86   PROT 6.1*   LABALBU 2.9*   BILITOT 0.6     Iron:  No results for input(s): IRONS, FERRITIN in the last 72 hours. Invalid input(s): LABIRONS  Urinalysis: No results for input(s): UA in the last 72 hours.     Objective:  Vitals: BP (!) 172/140   Pulse 105   Temp 99.1 °F (37.3 °C) (Oral)   Resp 18   Ht 5' 4\" (1.626 m)   Wt 253 lb 4.8 oz (114.9 kg)   SpO2 100%   BMI 43.48 kg/m²    Wt Readings from Last 3 Encounters:   04/30/22 253 lb 4.8 oz (114.9 kg)   04/20/22 225 lb (102.1 kg)   04/11/22 226 lb (102.5 kg)      24HR INTAKE/OUTPUT:      Intake/Output Summary (Last 24 hours) at 4/30/2022 0908  Last data filed at 4/30/2022 0438  Gross per 24 hour   Intake 460.18 ml   Output 1450 ml   Net -989.82 ml       General: alert, in no apparent distress  HEENT: normocephalic, atraumatic, anicteric  Neck: supple, no mass  Lungs: non-labored respirations, clear to auscultation bilaterally  Heart: regular rate and rhythm, no murmurs or rubs  Abdomen: soft, non-tender, non-distended  Ext: no cyanosis, no peripheral edema  Neuro: alert and oriented, no gross abnormalities  Psych: normal mood and affect  Skin: no rash      Electronically signed by Jennifer Vasquez DO, MD

## 2022-04-30 NOTE — PROGRESS NOTES
Pulmonary Progress Note    4/30/2022 12:23 PM    Subjective:   Admit Date: 4/22/2022  PCP: Roseann Dolan    Chief Complaint   Patient presents with    Abdominal Pain     n/v, Discharged from ER a couple days prior      Interval History: Transferred out of the ICU yesterday. Continues to be constipated. Had CTA chest which showed no pulmonary emboli. Anticoagulation has been stopped by primary. Continues to be on antibiotics. Currently on 2 L of oxygen. 14 points review of systems has been obtained and negative except to was mentioned in HPI.      Medications:   Scheduled Meds:   miconazole   Topical BID    lactulose  30 g Oral TID    metoprolol  5 mg IntraVENous Q6H    enoxaparin  30 mg SubCUTAneous BID    vancomycin  1,250 mg IntraVENous Q24H    furosemide  40 mg IntraVENous BID    potassium chloride  20 mEq Oral BID WC    sodium chloride flush  5-40 mL IntraVENous 2 times per day    meropenem  1,000 mg IntraVENous Q8H    prazosin  1 mg Oral Nightly    fentanNYL  50 mcg IntraVENous Once    rocuronium  1 mg/kg IntraVENous Once    pantoprazole (PROTONIX) 40 mg injection  40 mg IntraVENous Daily    sennosides-docusate sodium  2 tablet Oral BID    insulin lispro  0-6 Units SubCUTAneous 4 times per day    vancomycin (VANCOCIN) intermittent dosing (placeholder)   Other RX Placeholder    dextrose bolus (hypoglycemia)  250 mL IntraVENous Once    sodium chloride flush  5-40 mL IntraVENous 2 times per day    sertraline  200 mg Oral Daily    montelukast  10 mg Oral Nightly    [Held by provider] metoprolol succinate  50 mg Oral BID    levothyroxine  50 mcg Oral Daily    [Held by provider] isosorbide dinitrate  20 mg Oral TID    [Held by provider] haloperidol  5 mg Oral Daily    [Held by provider] doxepin  150 mg Oral Nightly    [Held by provider] busPIRone  10 mg Oral BID    [Held by provider] atorvastatin  80 mg Oral Nightly    polyvinyl alcohol  1 drop Both Eyes BID    [Held by provider] ranolazine  1,000 mg Oral BID    [Held by provider] meclizine  25 mg Oral BID    [Held by provider] hydrALAZINE  50 mg Oral 3 times per day    insulin glargine  40 Units SubCUTAneous Nightly     Continuous Infusions:   sodium chloride      sodium chloride 25 mL (22 1850)    sodium chloride      dextrose           Objective:   Vitals:   Temp (24hrs), Av.2 °F (36.8 °C), Min:97.7 °F (36.5 °C), Max:99.1 °F (37.3 °C)    BP (!) 172/140   Pulse 105   Temp 99.1 °F (37.3 °C) (Oral)   Resp 18   Ht 5' 4\" (1.626 m)   Wt 253 lb 4.8 oz (114.9 kg)   SpO2 100%   BMI 43.48 kg/m²   I/O:24HR INTAKE/OUTPUT:      Intake/Output Summary (Last 24 hours) at 2022 1223  Last data filed at 2022 0438  Gross per 24 hour   Intake 460.18 ml   Output 1450 ml   Net -989.82 ml      0701 -  0700  In: 940.2 [P.O.:840;  I.V.:47.8]  Out: 1480 [Urine:1400; Drains:80]  CVP: CVP (Mean): 28 mmHg      Invasive Lines: PICC    CVC  Art Line      ETT         Ventilator Settings:  Vent Mode: CPAP  Resp Rate (Set): 32 bmp   Vt (Set, mL): 330 mL   Pressure Support: 5 cmH20   PEEP/CPAP (cmH2O): 5   FiO2 : 50 %     Physical Exam:    General appearance -Liberian-speaking, alert, ill appearing,  Mental status - alert, oriented to person, place, and time  Eyes - pupils equal and reactive, extraocular eye movements intact  Nose - normal and patent   Neck - supple, no significant adenopathy  Chest -diminished bilaterally  to auscultation, no wheezes, rales or rhonchi, symmetric air entry  Heart - normal rate, regular rhythm, normal S1, S2, no murmurs, rubs, clicks or gallops  Abdomen - soft, nontender, nondistended, no masses or organomegaly  Rectal - deferred, not clinically indicated  Neurological - alert, oriented, normal speech, no focal findings or movement disorder noted, motor and sensory grossly normal bilaterally  Musculoskeletal - no joint tenderness, deformity or swelling  Extremities - peripheral pulses normal, no pedal edema, no clubbing or cyanosis  Skin - normal coloration and turgor, no rashes, no suspicious skin lesions noted    BMP:    Recent Labs     04/28/22  0557 04/28/22  1045 04/29/22  0600 04/29/22  0600 04/30/22  0600     --  138  --  139   K 3.5  --  3.9  3.9   < > 3.9  3.9     --  106  --  103   CO2 20  --  22  --  21   BUN 45*  --  34*  --  27*   CREATININE 1.75*   < > 1.29*  --  1.08*   GLUCOSE 121*  --  95  --  159*    < > = values in this interval not displayed. .  MG:3,PHOS:3)@  Ionized Calcium: No results found for: IONCA  CBC:   Recent Labs     04/29/22  0600 04/30/22  0558   WBC 10.4 11.2*   HGB 8.5* 7.8*    160      ABG: No results for input(s): PH, PCO2, PO2 in the last 72 hours. Assessment and Plan:     Impression:     -Acute hypoxic respiratory failure required mechanical ventilation . She was extubated 2 days ago. She is currently on 2 L of oxygen.    -Abnormal VQ scan. CTA chest is negative. Lower extremity venous Doppler is negative. Anticoagulation has been stopped  -Gram-positive bacteremia. Continues to be on anticoagulation  -Septic shock. Has been off vasopressors.  -Constipation     Recommendations:     -Continue current care. Wean off FiO2.   -Anticoagulation has been stopped. This is reasonable.  -Continue antibiotic under the guidance of infectious disease.  -Tight glucose control. -PT OT.  -Has been on bowel regimen and requiring enema due to constipation.   -DVT GI prophylaxis      Electronically signed by Rebekah Calix MD on 4/30/2022 at 12:23 PM

## 2022-05-01 NOTE — PROGRESS NOTES
Infectious Diseases Inpatient Progress Note          HISTORY OF PRESENT ILLNESS:  Follow up acute cholecystitis status postcholecystectomy, coag negative staph bacteremia, possible contamination versus line infection status post CVP removal, new PICC line placed 2 days ago on IV vancomycin and meropenem, well tolerated. Patient reports severe right upper quadrant abdominal pain. She was very lethargic while in ICU. Currently transferred to telemetry floor and is more awake and oriented. Had severe constipation but is currently being treated. Had 4 bowel movements today. Has poor appetite.   No nausea or vomiting  Repeat blood cultures are negative so far  No fevers  Persistent leukocytosis  Current Medications:     dicyclomine  20 mg IntraMUSCular 4x Daily    miconazole   Topical BID    enoxaparin  30 mg SubCUTAneous BID    vancomycin  1,250 mg IntraVENous Q24H    [Held by provider] furosemide  40 mg IntraVENous BID    metoprolol  5 mg IntraVENous 4 times per day    insulin lispro  0-6 Units SubCUTAneous TID WC    insulin lispro  0-3 Units SubCUTAneous Nightly    [Held by provider] potassium chloride  20 mEq Oral BID WC    sodium chloride flush  5-40 mL IntraVENous 2 times per day    meropenem  1,000 mg IntraVENous Q8H    prazosin  1 mg Oral Nightly    fentanNYL  50 mcg IntraVENous Once    rocuronium  1 mg/kg IntraVENous Once    pantoprazole (PROTONIX) 40 mg injection  40 mg IntraVENous Daily    sennosides-docusate sodium  2 tablet Oral BID    vancomycin (VANCOCIN) intermittent dosing (placeholder)   Other RX Placeholder    dextrose bolus (hypoglycemia)  250 mL IntraVENous Once    sodium chloride flush  5-40 mL IntraVENous 2 times per day    sertraline  200 mg Oral Daily    montelukast  10 mg Oral Nightly    [Held by provider] metoprolol succinate  50 mg Oral BID    levothyroxine  50 mcg Oral Daily    [Held by provider] isosorbide dinitrate  20 mg Oral TID    [Held by provider] haloperidol  5 mg Oral Daily    [Held by provider] doxepin  150 mg Oral Nightly    [Held by provider] busPIRone  10 mg Oral BID    [Held by provider] atorvastatin  80 mg Oral Nightly    polyvinyl alcohol  1 drop Both Eyes BID    [Held by provider] ranolazine  1,000 mg Oral BID    [Held by provider] meclizine  25 mg Oral BID    [Held by provider] hydrALAZINE  50 mg Oral 3 times per day    insulin glargine  40 Units SubCUTAneous Nightly       Allergies:  Ambien [zolpidem tartrate], Capoten [captopril], Clioquinol, Cogentin [benztropine], Depakote [divalproex sodium], Effexor xr [venlafaxine hcl er], Geodon [ziprasidone hcl], Lisinopril, Lyrica [pregabalin], Navane [thiothixene], Pamelor [nortriptyline hcl], Remeron [mirtazapine], Risperdal [risperidone], Trazodone and nefazodone, and Wellbutrin [bupropion]      Review of Systems  Limited 14 system review is negative other than HPI, severe pain and acute illness    Physical Exam  Vitals:    05/01/22 0000 05/01/22 0540 05/01/22 0646 05/01/22 0800   BP: 123/63 127/79 117/62    Pulse: 86 96 88 95   Resp:   18    Temp:   98.4 °F (36.9 °C)    TempSrc:   Oral    SpO2:  100% 100%    Weight:  254 lb (115.2 kg)     Height:         General Appearance: alert and oriented , well-developed and well-nourished, in no acute distress, on 2 L nasal cannula  Skin: warm and dry, no rash. Head: normocephalic and atraumatic  Eyes: anicteric sclerae  ENT: oropharynx clear and moist with normal mucous membranes.  No oral thrush  Lungs: normal respiratory effort, clear lungs  Heart normal S1-S2 no murmur  Abdomen: soft, right upper quadrant tenderness and drain with serosanguineous drainage, hyperactive bowel sounds  Intact right upper extremity PICC line  No erythema, no tenderness  Bilateral upper extremity swelling and edema  +1 bilateral leg edema  Small areas of ecchymosis  Follows simple commands appropriately  Colon catheter with slightly bloody urine    DATA:    Lab Results Component Value Date    WBC 15.1 (H) 05/01/2022    HGB 8.2 (L) 05/01/2022    HCT 24.9 (L) 05/01/2022    MCV 74.4 (L) 05/01/2022     05/01/2022     Lab Results   Component Value Date    CREATININE 1.40 (H) 05/01/2022    BUN 35 (H) 05/01/2022     05/01/2022    K 4.8 05/01/2022    K 4.8 05/01/2022     05/01/2022    CO2 21 05/01/2022       Hepatic Function Panel:  Lab Results   Component Value Date    ALKPHOS 101 05/01/2022    ALT 17 05/01/2022    AST 41 05/01/2022    PROT 6.4 05/01/2022    BILITOT 0.6 05/01/2022    BILIDIR <0.2 03/09/2022    IBILI see below 03/09/2022    LABALBU 2.9 05/01/2022    LABALBU <7.0 01/06/2020       Microbiology:   Recent Labs     04/29/22 1920   BC No Growth to date. Any change in status will be called. Recent Labs     04/29/22 1920   BLOODCULT2 No Growth to date. Any change in status will be called. KUB done yesterday     Impression   There are no acute intra-abdominal changes. There is a surgical drain in the right upper quadrant and there are surgical skin staples at several locations in the anterior abdomen     Susceptibility      Staphylococcus epidermidis (2)    Antibiotic Interpretation Microscan  Method Status    benzylpenicillin Resistant >=0.5 mcg/mL BACTERIAL SUSCEPTIBILITY PANEL BY ROBEL     clindamycin Sensitive <=0.25 mcg/mL BACTERIAL SUSCEPTIBILITY PANEL BY ROBEL     erythromycin Resistant >=8 mcg/mL BACTERIAL SUSCEPTIBILITY PANEL BY ROBEL     gentamicin Sensitive <=0.5 mcg/mL BACTERIAL SUSCEPTIBILITY PANEL BY ROBEL      Gentamicin is used only in combination with other active agents that   test susceptible.        inducible clindamycin resistance Sensitive NEGATIVE mcg/mL BACTERIAL SUSCEPTIBILITY PANEL BY ROBEL     oxacillin Resistant >=4 mcg/mL BACTERIAL SUSCEPTIBILITY PANEL BY ROBEL     tetracycline Resistant >=16 mcg/mL BACTERIAL SUSCEPTIBILITY PANEL BY ROBEL     trimethoprim-sulfamethoxazole Sensitive 20 mcg/mL BACTERIAL SUSCEPTIBILITY PANEL BY ROBEL vancomycin Sensitive 1 mcg/mL BACTERIAL SUSCEPTIBILITY PANEL BY ROBEL          Narrative  Performed by: Lizette Gaming       IMPRESSION:    · Septic versus cardiogenic shock   · Coag negative staph (MRSE) bacteremia/possible line infection versus contaminated blood cultures  · Acute cholecystitis status post laparoscopic cholecystectomy  · Acute on chronic kidney failure  · Obstructive sleep apnea on CPAP at home  · Acute urine retention status post Colon catheter placement      Patient Active Problem List   Diagnosis    Major depressive disorder, recurrent, severe with psychotic symptoms ([address]    Diabetes mellitus ([address]    Hypertension    HLD (hyperlipidemia)    Thyroid disease    Congestive heart failure (HCC)    Non-pressure ulcer of toe (HCC)    Atherosclerotic PVD with intermittent claudication ([facility])    Osteomyelitis ([address]    Infection of skin    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    Surgical wound dehiscence, initial encounter    S/P amputation of lesser toe, right ([facility])    Rectal bleeding    Athscl native arteries of left leg w ulceration oth prt foot ([address]    JESICA (obstructive sleep apnea)    Secondary diabetes mellitus ([address]    Chest pain    Peripheral vascular disease ([address]    Cellulitis    Syncope and collapse    Nonrheumatic mitral (valve) insufficiency    Ischemic myocardial dysfunction    Pulmonary hypertension (HCC)    Acute on chronic combined systolic and diastolic congestive heart failure (HCC)    Asthma    Acute kidney injury superimposed on CKD (HCC)    Dizziness    Acute cerebrovascular accident ([address]    Abnormal gait    Anxiety    Gastritis, unspecified, without bleeding    Pure hypercholesterolemia    Schizophrenia ([address]    Coronary arteriosclerosis    Extrapyramidal disease    Gangrene of toe (HCC)    Drug-induced hypotension    Osteomyelitis of right foot (HCC)    Nausea and vomiting    Mild intermittent asthma without complication    Heart failure, diastolic, with acute decompensation (HCC)    Major depressive disorder, single episode, unspecified    Type 2 diabetes mellitus with diabetic neuropathy, unspecified (HCC)    Obesity (BMI 30-39. 9)    Disorder of carotid artery (HCC)    [facility]STEMI (non-ST elevated [facility] infarction) [facility]    Pneumonia    CKD (chronic kidney disease) stage 3, GFR 30-59 ml/min (HCC)    Pain in right hand    Anesthesia of skin    Carpal tunnel syndrome    History of angioplasty of peripheral vessel    History of coronary artery bypass surgery    Paresthesia of skin    Acute decompensated heart failure (HCC)    Depression    Abdominal pain    Abdominal pain, right upper quadrant    Shock ([facility]    Gram-positive bacteremia    Acute cholecystitis       PLAN:  · Continue IV vancomycin and meropenem for now  · Consider CT of abdomen if patient has persistent severe abdominal pain or worsening leukocytosis  · Follow-up repeat blood cultures  · Follow-up CBC BMP    Discussed with patient and adult child    Apryl Gaspar MD

## 2022-05-01 NOTE — PROGRESS NOTES
Pulmonary Progress Note    5/1/2022 12:29 PM    Subjective:   Admit Date: 4/22/2022  PCP: Ina     Chief Complaint   Patient presents with    Abdominal Pain     n/v, Discharged from ER a couple days prior      Interval History: Had been on 3 L of oxygen. Constipation is better. Daughter is at the bedside. Continues to be on antibiotics. 14 points review of systems has been obtained and negative except to was mentioned in HPI.      Medications:   Scheduled Meds:   dicyclomine  20 mg IntraMUSCular 4x Daily    miconazole   Topical BID    enoxaparin  30 mg SubCUTAneous BID    vancomycin  1,250 mg IntraVENous Q24H    [Held by provider] furosemide  40 mg IntraVENous BID    metoprolol  5 mg IntraVENous 4 times per day    insulin lispro  0-6 Units SubCUTAneous TID WC    insulin lispro  0-3 Units SubCUTAneous Nightly    [Held by provider] potassium chloride  20 mEq Oral BID WC    sodium chloride flush  5-40 mL IntraVENous 2 times per day    meropenem  1,000 mg IntraVENous Q8H    prazosin  1 mg Oral Nightly    fentanNYL  50 mcg IntraVENous Once    rocuronium  1 mg/kg IntraVENous Once    pantoprazole (PROTONIX) 40 mg injection  40 mg IntraVENous Daily    sennosides-docusate sodium  2 tablet Oral BID    vancomycin (VANCOCIN) intermittent dosing (placeholder)   Other RX Placeholder    dextrose bolus (hypoglycemia)  250 mL IntraVENous Once    sodium chloride flush  5-40 mL IntraVENous 2 times per day    sertraline  200 mg Oral Daily    montelukast  10 mg Oral Nightly    [Held by provider] metoprolol succinate  50 mg Oral BID    levothyroxine  50 mcg Oral Daily    [Held by provider] isosorbide dinitrate  20 mg Oral TID    [Held by provider] haloperidol  5 mg Oral Daily    [Held by provider] doxepin  150 mg Oral Nightly    [Held by provider] busPIRone  10 mg Oral BID    [Held by provider] atorvastatin  80 mg Oral Nightly    polyvinyl alcohol  1 drop Both Eyes BID    [Held by provider] ranolazine  1,000 mg Oral BID    [Held by provider] meclizine  25 mg Oral BID    [Held by provider] hydrALAZINE  50 mg Oral 3 times per day    insulin glargine  40 Units SubCUTAneous Nightly     Continuous Infusions:   sodium chloride      sodium chloride 25 mL (22 1850)    sodium chloride      dextrose           Objective:   Vitals:   Temp (24hrs), Av.1 °F (36.7 °C), Min:97.9 °F (36.6 °C), Max:98.4 °F (36.9 °C)    /62   Pulse 95   Temp 98.4 °F (36.9 °C) (Oral)   Resp 18   Ht 5' 4\" (1.626 m)   Wt 254 lb (115.2 kg)   SpO2 100%   BMI 43.60 kg/m²   I/O:24HR INTAKE/OUTPUT:      Intake/Output Summary (Last 24 hours) at 2022 1229  Last data filed at 2022 0539  Gross per 24 hour   Intake 120 ml   Output 1100 ml   Net -980 ml      0701 -  0700  In: 840 [P.O.:840]  Out: 1100 [Urine:1000; Drains:100]  CVP: CVP (Mean): 28 mmHg      Invasive Lines: PICC    CVC  Art Line      ETT         Ventilator Settings:  Vent Mode: CPAP  Resp Rate (Set): 32 bmp   Vt (Set, mL): 330 mL   Pressure Support: 5 cmH20   PEEP/CPAP (cmH2O): 5   FiO2 : 50 %     Physical Exam:    General appearance -Ecuadorean-speaking, alert, ill appearing,  Mental status - alert, oriented to person, place, and time  Eyes - pupils equal and reactive, extraocular eye movements intact  Nose - normal and patent   Neck - supple, no significant adenopathy  Chest -diminished bilaterally  to auscultation, no wheezes, rales or rhonchi, symmetric air entry  Heart - normal rate, regular rhythm, normal S1, S2, no murmurs, rubs, clicks or gallops  Abdomen - soft, nontender, nondistended, no masses or organomegaly  Rectal - deferred, not clinically indicated  Neurological - alert, oriented, normal speech, no focal findings or movement disorder noted, motor and sensory grossly normal bilaterally  Musculoskeletal - no joint tenderness, deformity or swelling  Extremities - peripheral pulses normal, no pedal edema, no clubbing or cyanosis  Skin - normal coloration and turgor, no rashes, no suspicious skin lesions noted    BMP:    Recent Labs     04/29/22  0600 04/29/22  0600 04/30/22  0600 04/30/22  0600 05/01/22  0600      < > 139  --  138   K 3.9  3.9   < > 3.9  3.9   < > 4.8  4.8      < > 103  --  104   CO2 22  --  21  --  21   BUN 34*   < > 27*  --  35*   CREATININE 1.29*   < > 1.08*  --  1.40*   GLUCOSE 95   < > 159*  --  172*    < > = values in this interval not displayed. .  MG:3,PHOS:3)@  Ionized Calcium: No results found for: IONCA  CBC:   Recent Labs     04/30/22  0558 05/01/22  0600   WBC 11.2* 15.1*   HGB 7.8* 8.2*    176      ABG: No results for input(s): PH, PCO2, PO2 in the last 72 hours. Assessment and Plan:     Impression:     -Acute hypoxic respiratory failure required mechanical ventilation . She was extubated 3 days ago. She is currently on 3 L of oxygen.    -Abnormal VQ scan. CTA chest is negative. Lower extremity venous Doppler is negative. Anticoagulation has been stopped  -Gram-positive bacteremia. Continues to be on  antibiotics  -Septic shock. Has been off vasopressors.  -Constipation. This is better     Recommendations:     -Continue current care. Wean off FiO2.   - Continue antibiotic under the guidance of infectious disease.  -Tight glucose control. -PT OT.  -Has been on bowel regimen and requiring enema due to constipation.   -DVT GI prophylaxis      Electronically signed by Zac Buchanan MD on 5/1/2022 at 12:29 PM

## 2022-05-01 NOTE — PROGRESS NOTES
CARDIOLOGY [address] PROGRESS NOTE         5/1/2022      Cuong Curry    [id number]  1955    Rounding MD: Nury Mkai MD ,[facility]    Primary Cardiologist: Jc Graham MD [address] [place]      SUBJECTIVE:    Looks better overall. Has abdominal discomfort. No CP. BCs Positive, ID on case. Family present. Cardiac and general ROS otherwise negative and unchanged. ASSESSMENT:    1. Post recent Cholecystectomy  2. MS Changes / Encephalopathy  3. Shock, Septic probably primary, cardiogenic secondarily  4. CHB, Transient, now resolved. 5. NSTEMI  6. PHTN with RVE , RVSP 60  7. VHD, MR and TR, Moderate  8. CKD  9. Chest pain Hx, reproducible, musculoskeletal, noncardiac  10. CHF, Systolic, acute on chronic  11. CAD post CABG June 2019 [facility] [place] to [place], PCI to left circumflex and right coronary artery, last April 2020. Post catheterization April 2021, medical treatment advised. 12. Negative Myoview perfusion stress test, 8/2021. 13. Prior Ischemic cardiomyopathy with apical hypokinesia LVEF 35 to 40%. 14. PVOD post right lower extremity revascularization,  and [facility] Dr David Hernandez, (details not available), Right third toe amputation site infection. 15. Carotid artery disease with ultrasound noted 50-69% bilateral stenoses May 2019. 16. Hypothyroidism  17. Hypertension  18. Hyperlipidemia  19. Diabetes  20. Depression  21. Schizophrenia  22. Family history of coronary artery disease  21. Multiple allergies as noted. PLAN:    1. Supportive Cardiac Care  2. IV Antibiotics. ID care  3. IV medications otherwise as tolerated, including IV metoprolol, advance to home PO medications once able. 4. Further recommendations to follow  5.  See orders      OBJECTIVE:     MEDICATIONS:     Scheduled Meds:   dicyclomine  20 mg IntraMUSCular 4x Daily    sodium chloride  500 mL IntraVENous Once    miconazole   Topical BID    enoxaparin  30 mg SubCUTAneous BID    vancomycin  1,250 mg IntraVENous Q24H    [Held by provider] furosemide  40 mg IntraVENous BID    metoprolol  5 mg IntraVENous 4 times per day    insulin lispro  0-6 Units SubCUTAneous TID WC    insulin lispro  0-3 Units SubCUTAneous Nightly    [Held by provider] potassium chloride  20 mEq Oral BID WC    sodium chloride flush  5-40 mL IntraVENous 2 times per day    meropenem  1,000 mg IntraVENous Q8H    prazosin  1 mg Oral Nightly    fentanNYL  50 mcg IntraVENous Once    rocuronium  1 mg/kg IntraVENous Once    pantoprazole (PROTONIX) 40 mg injection  40 mg IntraVENous Daily    sennosides-docusate sodium  2 tablet Oral BID    vancomycin (VANCOCIN) intermittent dosing (placeholder)   Other RX Placeholder    dextrose bolus (hypoglycemia)  250 mL IntraVENous Once    sodium chloride flush  5-40 mL IntraVENous 2 times per day    sertraline  200 mg Oral Daily    montelukast  10 mg Oral Nightly    [Held by provider] metoprolol succinate  50 mg Oral BID    levothyroxine  50 mcg Oral Daily    [Held by provider] isosorbide dinitrate  20 mg Oral TID    [Held by provider] haloperidol  5 mg Oral Daily    [Held by provider] doxepin  150 mg Oral Nightly    [Held by provider] busPIRone  10 mg Oral BID    [Held by provider] atorvastatin  80 mg Oral Nightly    polyvinyl alcohol  1 drop Both Eyes BID    [Held by provider] ranolazine  1,000 mg Oral BID    [Held by provider] meclizine  25 mg Oral BID    [Held by provider] hydrALAZINE  50 mg Oral 3 times per day    insulin glargine  40 Units SubCUTAneous Nightly     Continuous Infusions:   sodium chloride      sodium chloride 25 mL (04/29/22 1850)    sodium chloride      dextrose       PRN Meds:dextrose bolus (hypoglycemia) **OR** dextrose bolus (hypoglycemia), glucose, morphine, bisacodyl, sodium chloride flush, sodium chloride, atropine, sodium chloride, sodium chloride flush, sodium chloride, ondansetron **OR** ondansetron, polyethylene glycol, acetaminophen **OR** acetaminophen, albuterol, albuterol sulfate HFA, hydrOXYzine, glucagon (rDNA), dextrose    PHYSICAL EXAM:    CURRENT VITALS: /62   Pulse 88   Temp 98.4 °F (36.9 °C) (Oral)   Resp 18   Ht 5' 4\" (1.626 m)   Wt 254 lb (115.2 kg)   SpO2 100%   BMI 43.60 kg/m²     CONSTITUTIONAL:  awake, alert, cooperative, no apparent distress,   ENT:  Normocephalic, without obvious abnormality, atraumatic, sinuses nontender on palpation, external ears without lesions,  NECK:  Supple, symmetrical, trachea midline, no adenopathy, thyroid symmetric, not enlarged and no tenderness, skin normal  LUNGS:  No increased work of breathing, good air exchange, clear to auscultation bilaterally, no crackles or wheezing  CARDIOVASCULAR:  Normal apical impulse, regular rate and rhythm, normal S1 and S2,  and no murmur noted  ABDOMEN:  Normal bowel sounds, soft, non-distended, non-tender, no masses palpated, no hepatosplenomegally  EXTREMITIES:  No edema, Pulses strong throughout. NEUROLOGIC:  Awake, alert, oriented to name, place and time.  Following all commands and moving all extremties  SKIN:  no bruising or bleeding, normal skin color, texture, turgor and no rashes     Data:       LABS:  Recent Results (from the past 24 hour(s))   POCT Glucose    Collection Time: 04/30/22 10:51 AM   Result Value Ref Range    POC Glucose 206 (H) 70 - 99 mg/dl    Performed on ACCU-CHEK    POCT Glucose    Collection Time: 04/30/22  4:05 PM   Result Value Ref Range    POC Glucose 220 (H) 70 - 99 mg/dl    Performed on ACCU-CHEK    POCT Glucose    Collection Time: 04/30/22  8:17 PM   Result Value Ref Range    POC Glucose 220 (H) 70 - 99 mg/dl    Performed on ACCU-CHEK    Comprehensive Metabolic Panel w/ Reflex to MG    Collection Time: 05/01/22  6:00 AM   Result Value Ref Range    Sodium 138 135 - 144 mEq/L    Potassium reflex Magnesium 4.8 3.4 - 4.9 mEq/L    Chloride 104 95 - 107 mEq/L    CO2 21 20 - 31 mEq/L    Anion Gap 13 9 - 15 mEq/L    Glucose 172 (H) 70 - 99 mg/dL    BUN 35 (H) 8 - 23 mg/dL    CREATININE 1.40 (H) 0.50 - 0.90 mg/dL    GFR Non-African American 37.8 (L) >60    GFR  45.7 (L) >60    Calcium 8.4 (L) 8.5 - 9.9 mg/dL    Total Protein 6.4 6.3 - 8.0 g/dL    Albumin 2.9 (L) 3.5 - 4.6 g/dL    Total Bilirubin 0.6 0.2 - 0.7 mg/dL    Alkaline Phosphatase 101 40 - 130 U/L    ALT 17 0 - 33 U/L    AST 41 (H) 0 - 35 U/L    Globulin 3.5 2.3 - 3.5 g/dL   CBC with Auto Differential    Collection Time: 05/01/22  6:00 AM   Result Value Ref Range    WBC 15.1 (H) 4.8 - 10.8 K/uL    RBC 3.34 (L) 4.20 - 5.40 M/uL    Hemoglobin 8.2 (L) 12.0 - 16.0 g/dL    Hematocrit 24.9 (L) 37.0 - 47.0 %    MCV 74.4 (L) 82.0 - 100.0 fL    MCH 24.6 (L) 27.0 - 31.3 pg    MCHC 33.1 33.0 - 37.0 %    RDW 19.9 (H) 11.5 - 14.5 %    Platelets 056 743 - 704 K/uL    Neutrophils % 81.3 %    Lymphocytes % 9.4 %    Monocytes % 8.5 %    Eosinophils % 0.3 %    Basophils % 0.5 %    Neutrophils Absolute 12.3 (H) 1.4 - 6.5 K/uL    Lymphocytes Absolute 1.4 1.0 - 4.8 K/uL    Monocytes Absolute 1.3 (H) 0.2 - 0.8 K/uL    Eosinophils Absolute 0.0 0.0 - 0.7 K/uL    Basophils Absolute 0.1 0.0 - 0.2 K/uL   Renal Function Panel    Collection Time: 05/01/22  6:00 AM   Result Value Ref Range    Potassium 4.8 3.4 - 4.9 mEq/L    Phosphorus 2.9 2.3 - 4.8 mg/dL   POCT Glucose    Collection Time: 05/01/22  6:46 AM   Result Value Ref Range    POC Glucose 199 (H) 70 - 99 mg/dl    Performed on ACCU-CHEK             EKG:    Sinus bradycardia with 1st degree AV block   Nonspecific intraventricular conduction delay   ST & T wave abnormality, consider inferior ischemia   ST & T wave abnormality, consider anterolateral ischemia   Abnormal ECG     ECHO:   Left ventricular ejection fraction is visually estimated at 45-50%.    Near akinesis of septum, hypokinesis of septal aspect of apex and distal    most anteroseptal wall.    Overall LVEF seems a bit better than 8/2021    Right ventricle global systolic function is mildly reduced .    Moderately enlarged right ventricle cavity.    Right ventricular systolic pressure of 47 mm Hg consistent with moderate    pulmonary hypertension.    8/2021 RVSP was 60 mm Hg    Mildly dilated left atrium.    Markedly enlarged right atrium size.    Normal mitral valve structure    3+ MR    Normal aortic valve structure and function.    Normal tricuspid valve structure    Severe tricuspid regurgitation.              Becky Pastor MD ,[facility]

## 2022-05-01 NOTE — PROGRESS NOTES
Nephrology Progress Note    Assessment:  RADHA dehydration  DM type-2  PAD  Recent Teri  Hypertension  OHDx CAD  EF 35%          Plan: hold lasix  Give saline bolus bmp am    Patient Active Problem List:     Major depressive disorder, recurrent, severe with psychotic symptoms ([address]     Diabetes mellitus ([address]     Hypertension     HLD (hyperlipidemia)     Thyroid disease     Congestive heart failure (HCC)     Non-pressure ulcer of toe (HCC)     Atherosclerotic PVD with intermittent claudication ([facility])     Osteomyelitis ([facility])     Infection of skin     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     Surgical wound dehiscence, initial encounter     S/P amputation of lesser toe, right ([facility])     Rectal bleeding     Athscl native arteries of left leg w ulceration oth prt foot ([facility])     JESICA (obstructive sleep apnea)     Secondary diabetes mellitus ([facility])     Chest pain     Peripheral vascular disease ([facility])     Cellulitis     Syncope and collapse     Nonrheumatic mitral (valve) insufficiency     Ischemic myocardial dysfunction     Pulmonary hypertension (HCC)     Acute on chronic combined systolic and diastolic congestive heart failure (HCC)     Asthma     Acute kidney injury superimposed on CKD ([facility])     Dizziness     Acute cerebrovascular accident ([address]     Abnormal gait     Anxiety     Gastritis, unspecified, without bleeding     Pure hypercholesterolemia     Schizophrenia ([address]     Coronary arteriosclerosis     Extrapyramidal disease     Gangrene of toe (HCC)     Drug-induced hypotension     Osteomyelitis of right foot ([facility])     Nausea and vomiting     Mild intermittent asthma without complication     Heart failure, diastolic, with acute decompensation ([facility])     Major depressive disorder, single episode, unspecified     Type 2 diabetes mellitus with diabetic neuropathy, unspecified ([facility])     Obesity (BMI 30-39. 9)     Disorder of carotid artery (HCC)     NSTEMI (non-ST elevated myocardial infarction) ([address]     Pneumonia     CKD (chronic kidney disease) stage 3, GFR 30-59 ml/min ([facility])     Pain in right hand     Anesthesia of skin     Carpal tunnel syndrome     History of angioplasty of peripheral vessel     History of coronary artery bypass surgery     Paresthesia of skin     Acute decompensated heart failure ([facility])     Depression     Abdominal pain     Abdominal pain, right upper quadrant     Shock ([facility]     Gram-positive bacteremia     Acute cholecystitis      Subjective:  Admit Date: 4/22/2022    Interval History: alert no distress  Family in room    Medications:  Scheduled Meds:   dicyclomine  20 mg IntraMUSCular 4x Daily    sodium chloride  500 mL IntraVENous Once    miconazole   Topical BID    enoxaparin  30 mg SubCUTAneous BID    vancomycin  1,250 mg IntraVENous Q24H    [Held by provider] furosemide  40 mg IntraVENous BID    metoprolol  5 mg IntraVENous 4 times per day    insulin lispro  0-6 Units SubCUTAneous TID WC    insulin lispro  0-3 Units SubCUTAneous Nightly    potassium chloride  20 mEq Oral BID WC    sodium chloride flush  5-40 mL IntraVENous 2 times per day    meropenem  1,000 mg IntraVENous Q8H    prazosin  1 mg Oral Nightly    fentanNYL  50 mcg IntraVENous Once    rocuronium  1 mg/kg IntraVENous Once    pantoprazole (PROTONIX) 40 mg injection  40 mg IntraVENous Daily    sennosides-docusate sodium  2 tablet Oral BID    vancomycin (VANCOCIN) intermittent dosing (placeholder)   Other RX Placeholder    dextrose bolus (hypoglycemia)  250 mL IntraVENous Once    sodium chloride flush  5-40 mL IntraVENous 2 times per day    sertraline  200 mg Oral Daily    montelukast  10 mg Oral Nightly    [Held by provider] metoprolol succinate  50 mg Oral BID    levothyroxine  50 mcg Oral Daily    [Held by provider] isosorbide dinitrate  20 mg Oral TID    [Held by provider] haloperidol  5 mg Oral Daily    [Held by provider] doxepin  150 mg Oral Nightly    [Held by provider] busPIRone  10 mg Oral BID    [Held by provider] atorvastatin  80 mg Oral Nightly    polyvinyl alcohol  1 drop Both Eyes BID    [Held by provider] ranolazine  1,000 mg Oral BID    [Held by provider] meclizine  25 mg Oral BID    [Held by provider] hydrALAZINE  50 mg Oral 3 times per day    insulin glargine  40 Units SubCUTAneous Nightly     Continuous Infusions:   sodium chloride      sodium chloride 25 mL (04/29/22 1850)    sodium chloride      dextrose         CBC:   Recent Labs     04/30/22  0558 05/01/22  0600   WBC 11.2* 15.1*   HGB 7.8* 8.2*    176     CMP:    Recent Labs     04/29/22  0600 04/29/22  0600 04/30/22  0600 05/01/22  0600     --  139 138   K 3.9  3.9   < > 3.9  3.9 4.8  4.8     --  103 104   CO2 22  --  21 21   BUN 34*  --  27* 35*   CREATININE 1.29*  --  1.08* 1.40*   GLUCOSE 95  --  159* 172*   CALCIUM 8.2*  --  8.2* 8.4*   LABGLOM 41.5*  --  51.0* 37.8*    < > = values in this interval not displayed. Troponin:   Recent Labs     04/29/22  0600   TROPONINI 0.625*     BNP: No results for input(s): BNP in the last 72 hours. INR: No results for input(s): INR in the last 72 hours. Lipids: No results for input(s): CHOL, LDLDIRECT, TRIG, HDL, AMYLASE, LIPASE in the last 72 hours. Liver:   Recent Labs     05/01/22  0600   AST 41*   ALT 17   ALKPHOS 101   PROT 6.4   LABALBU 2.9*   BILITOT 0.6     Iron:  No results for input(s): IRONS, FERRITIN in the last 72 hours. Invalid input(s): LABIRONS  Urinalysis: No results for input(s): UA in the last 72 hours.     Objective:  Vitals: /62   Pulse 88   Temp 98.4 °F (36.9 °C) (Oral)   Resp 18   Ht 5' 4\" (1.626 m)   Wt 254 lb (115.2 kg)   SpO2 100%   BMI 43.60 kg/m²    Wt Readings from Last 3 Encounters:   05/01/22 254 lb (115.2 kg)   04/20/22 225 lb (102.1 kg)   04/11/22 226 lb (102.5 kg)      24HR INTAKE/OUTPUT:      Intake/Output Summary (Last 24 hours) at 5/1/2022 0828  Last data filed at 5/1/2022 0539  Gross per 24 hour   Intake 840 ml   Output 1100 ml   Net -260 ml       General: alert, in no apparent distress  HEENT: normocephalic, atraumatic, anicteric  Neck: supple, no mass  Lungs: non-labored respirations, clear to auscultation bilaterally  Heart: regular rate and rhythm, no murmurs or rubs  Abdomen: soft, non-tender, non-distended  Ext: no cyanosis, no peripheral edema  Neuro: alert and oriented, no gross abnormalities  Psych: normal mood and affect  Skin: no rash      Electronically signed by Nuzhat Steiner DO, MD

## 2022-05-01 NOTE — PROGRESS NOTES
[facility] Pharmacokinetic Monitoring Service - Vancomycin    Consulting Provider: Avril Salgado CNP   Indication: sepsis   Target Concentration: Goal AUC/ROBEL 400-600 mg*hr/L  Day of Therapy: 5  Additional Antimicrobials: zosyn    Pertinent Laboratory Values: Wt Readings from Last 1 Encounters:   04/30/22 253 lb 4.8 oz (114.9 kg)     Temp Readings from Last 1 Encounters:   05/01/22 98.4 °F (36.9 °C) (Oral)     Estimated Creatinine Clearance: 65 mL/min (A) (based on SCr of 1.08 mg/dL (H)). Recent Labs     04/29/22  0600 04/30/22  0558 04/30/22  0600 05/01/22  0600   CREATININE   < >  --  1.08* 1.40*   WBC  --  11.2*  --  15.1*    < > = values in this interval not displayed. Procalcitonin: 0.33ng/mL on 4/27/22     ertinent Cultures:  Culture Date Source Results   04/27/22  Repeated: 4/29/22 Blood  Staphylococcus epidermidis    04/27/22 Urine  No growth   04/27/22 Respiratory  No growth       MRSA Nasal Swab: N/A. Non-respiratory infection.     Recent vancomycin administrations                     vancomycin (VANCOCIN) 750 mg in dextrose 5 % 250 mL IVPB (mg) 750 mg New Bag 04/28/22 2032    vancomycin 1000 mg IVPB in 250 mL D5W addavial ()  Restarted 04/27/22 1407     1,000 mg New Bag  1406                    Assessment:  Date/Time Current Dose Concentration Timing of Concentration (h) AUC   04/30/22 750mg IV Q24 hours  10.2mg/L on 04/3022 @ 0600 ~10 hours post dose  Predicted: 281mg/L.hr    Note: Serum concentrations collected for AUC dosing may appear elevated if collected in close proximity to the dose administered, this is not necessarily an indication of toxicity    Plan:  Current dosing regimen is therapeutic  Continue current dose of vancomycin 1250 mg every 24 hours   Repeat vancomycin concentration ordered for 05/02/22 @ 0600   Pharmacy will continue to monitor patient and adjust therapy as indicated  [facility] Rx updated and pharmacokinetic analysis predicts the following: AUC of 596mg/L.hr and trough of 21 mg/L at steady state. Creatinine jumped from 1.08 to 1.4 in the past 24 hours.  Predictions are on the high end of therapeutic/supra therapeutic, but given dose modification yesterday and lab ordered for tomorrow, will defer any dose modifications until after lab results tomorrow     Thank you for the consult,    Joe aMrina, PharmD  PGY-1 Pharmacy Resident  5/1/2022 1:26 PM

## 2022-05-01 NOTE — PROGRESS NOTES
Medicare Wellness Visit  Plan for Preventive Care    A good way for you to stay healthy is to use preventive care.  Medicare covers many services that can help you stay healthy.* The goal of these services is to find any health problems as quickly as possible. Finding problems early can help make them easier to treat.  Your personal plan below lists the services you may need and when they are due.     Health Maintenance Summary     Shingles Vaccine (1 of 2)  Overdue - never done    DTaP/Tdap/Td Vaccine (1 - Tdap)  Overdue since 2/17/1999    Medicare Wellness Visit (Yearly)  Due since 12/9/2021    Depression Screening (Yearly)  Next due on 12/22/2022    Hepatitis B Vaccine   Aged Out    Pneumococcal Vaccine 65+   Completed    Osteoporosis Screening   Completed    Influenza Vaccine   Completed    COVID-19 Vaccine   Completed    Meningococcal Vaccine   Aged Out    HPV Vaccine   Aged Out           Preventive Care for Women and Men    Heart Screenings (Cardiovascular):  · Blood tests are used to check your cholesterol, lipid and triglyceride levels. High levels can increase your risk for heart disease and stroke. High levels can be treated with medications, diet and exercise. Lowering your levels can help keep your heart and blood vessels healthy.  Your provider will order these tests if they are needed.    · An ultrasound is done to see if you have an abdominal aortic aneurysm (AAA).  This is an enlargement of one of the main blood vessels that delivers blood to the body.   In the [place], 9,000 deaths are caused by AAA.  You may not even know you have this problem and as many as 1 in 3 people will have a serious problem if it is not treated.  Early diagnosis allows for more effective treatment and cure.  If you have a family history of AAA or are a male age 65-75 who has smoked, you are at higher risk of an AAA.  Your provider can order this test, if needed.    Colorectal Screening:  · There are many tests that  Progress Note  Date:2022       TRUP:L104/[id number]  Patient Laina Stafford     YOB: 1955     Age:66 y.o.    ROS: Point review system cannot be obtained due to acuity of medical condition. Subjective      Objective         Vitals Last 24 Hours:  TEMPERATURE:  Temp  Av.1 °F (36.7 °C)  Min: 97.9 °F (36.6 °C)  Max: 98.4 °F (36.9 °C)  RESPIRATIONS RANGE: Resp  Av  Min: 18  Max: 18  PULSE OXIMETRY RANGE: SpO2  Av %  Min: 100 %  Max: 100 %  PULSE RANGE: Pulse  Av  Min: 86  Max: 97  BLOOD PRESSURE RANGE: Systolic (89XUJ), BHD:273 , Min:117 , TMY:743   ; Diastolic (92HGS), IA, Min:62, Max:79    I/O (24Hr): Intake/Output Summary (Last 24 hours) at 2022 1222  Last data filed at 2022 0539  Gross per 24 hour   Intake 120 ml   Output 1100 ml   Net -980 ml     Objective:  General Appearance:  Ill-appearing. Vital signs: (most recent): Blood pressure 117/62, pulse 95, temperature 98.4 °F (36.9 °C), temperature source Oral, resp. rate 18, height 5' 4\" (1.626 m), weight 254 lb (115.2 kg), SpO2 100 %, not currently breastfeeding. HEENT: Normal HEENT exam.    Heart: S1 normal and S2 normal.    Abdomen: Abdomen is soft. Bowel sounds are normal.   There is no abdominal tenderness. Pulses: Distal pulses are intact. Neurological: (Sedated, minimal responsive). Pupils:  Pupils are equal, round, and reactive to light. Skin:  Warm and dry.       Labs/Imaging/Diagnostics    Labs:  CBC:  Recent Labs     22  0600 22  0558 22  0600   WBC 10.4 11.2* 15.1*   RBC 3.39* 3.16* 3.34*   HGB 8.5* 7.8* 8.2*   HCT 25.2* 23.6* 24.9*   MCV 74.2* 74.7* 74.4*   RDW 19.9* 19.8* 19.9*    160 176     CHEMISTRIES:  Recent Labs     22  0600 22  0600 22  0600 22  0600     --  139 138   K 3.9  3.9   < > 3.9  3.9 4.8  4.8     --  103 104   CO2 22  --  21 21   BUN 34*  --  27* 35*   CREATININE 1.29*  --  1.08* 1.40* GLUCOSE 95  --  159* 172*   PHOS 2.6  --  2.5 2.9   MG 2.0  --  1.6*  --     < > = values in this interval not displayed. PT/INR:  No results for input(s): PROTIME, INR in the last 72 hours. APTT:  No results for input(s): APTT in the last 72 hours. LIVER PROFILE:  Recent Labs     04/29/22  0600 04/30/22  0600 05/01/22  0600   AST 53* 41* 41*   ALT 18 16 17   BILITOT 0.5 0.6 0.6   ALKPHOS 88 86 101       Imaging Last 24 Hours:  CT ABDOMEN PELVIS WO CONTRAST Additional Contrast? None    Result Date: 4/23/2022  EXAM:  CT ABDOMEN PELVIS WO CONTRAST History: Abdominal pain Technique: Multiple contiguous axial images were obtained of the abdomen and pelvis from the level of the lung bases through the ischial tuberosities without contrast. Multiplanar reformats were obtained. Comparison: CT abdomen pelvis April 20, 2022 Findings: Lung bases are clear. Heart size is enlarged. Mitral annular calcification. Evidence of prior thoracic surgery. Lack of intravenous contrast precludes optimal evaluation of the abdominal and pelvic viscera. The unenhanced liver, spleen, stomach, pancreas, and adrenal glands appear within normal limits. The gallbladder is mildly distended but not significant changed from recent CT. There are debris is present within the gallbladder lumen. No gallbladder wall thickening or pericholecystic fluid identified by CT. Mild bilateral perinephric stranding. No urinary tract calculi or hydronephrosis. The urinary bladder is decompressed. The uterus is absent. Abdominal aorta is nonaneurysmal  . No retroperitoneal or abdominal/pelvic lymphadenopathy. No small bowel obstruction. No overt colonic mass or pericolonic inflammation. No findings of acute appendicitis No free fluid, loculated fluid collection, or pneumoperitoneum. No acute osseous abnormality. Mildly distended gallbladder containing gallbladder sludge and/or tiny gallstones without significant interval change since April 20, 2022 CT.  No are used to check for cancer of your colon and rectum. You and your provider should discuss what test is best for you and when to have it done.  Options include:  · Screening Colonoscopy: exam of the entire colon, seen through a flexible lighted tube.  · Flexible Sigmoidoscopy: exam of the last third (sigmoid portion) of the colon and rectum, seen through a flexible lighted tube.  · Cologuard DNA stool test: a sample of your stool is used to screen for cancer and unseen blood in your stool.  · Fecal Occult Blood Test: a sample of your stool is studied to find any unseen blood    Flu Shot:  · An immunization that helps to prevent influenza (the flu). You should get this every year. The best time to get the shot is in the fall.    Pneumococcal Shot:  • Vaccines are available that can help prevent pneumococcal disease, which is any type of infection caused by Streptococcus pneumoniae bacteria.   Their use can prevent some cases of pneumonia, meningitis, and sepsis. There are two types of pneumococcal vaccines:   o Conjugate vaccines (PCV-13 or Prevnar 13®) - helps protect against the 13 types of pneumococcal bacteria that are the most common causes of serious infections in children and adults.    o Polysaccharide vaccine (PPSV23 or Rvufcydmq76®) - helps protect against 23 types of pneumococcal bacteria for patients who are recommended to get it.  These vaccines should be given at least 12 months apart.  A booster is usually not needed.     Hepatitis B Shot:  · An immunization that helps to protect people from getting Hepatitis B. Hepatitis B is a virus that spreads through contact with infected blood or body fluids. Many people with the virus do not have symptoms.  The virus can lead to serious problems, such as liver disease. Some people are at higher risk than others. Your doctor will tell you if you need this shot.     Diabetes Screening:  · A test to measure sugar (glucose) in your blood is called a fasting blood  CT findings of acute cholecystitis. Mild bilateral perinephric stranding is nonspecific. Correlation for polynephritis is recommended. All CT scans at this facility use dose modulation, iterative reconstruction, and/or weight based dosing when appropriate to reduce radiation dose to as low as reasonably achievable. US ABDOMEN LIMITED    Result Date: 4/23/2022  EXAM: US ABDOMEN LIMITED HISTORY: Right upper quadrant pain TECHNIQUE: Ultrasound evaluation was performed of the right upper quadrant of the abdomen. COMPARISON: CT abdomen pelvis April 23, 2022 FINDINGS: Normal echogenicity and contour of the liver. No liver lesion or intrahepatic biliary dilatation. Main portal vein is patent. The gallbladder is mildly distended. Layering echogenic material is identified within the lumen of the gallbladder. No pericholecystic fluid. Gallbladder wall thickness is at the upper limits of normal measuring 2.7 mm. Negative Velez sign reported. Common bile duct is normal in diameter measuring 4 mm. No overt abnormality of the pancreas. Nonspecific findings of the gallbladder including mildly distended gallbladder containing layering sludge and/or tiny gallstones with wall thickness of the upper limits of normal measuring 2.7 mm. No pericholecystic fluid to suggest acute cholecystitis. Assessment//Plan           Hospital Problems           Last Modified POA    * (Principal) Abdominal pain 4/23/2022 Yes    Abdominal pain, right upper quadrant 4/25/2022 Yes    Shock ([address] 4/29/2022 Yes    Gram-positive bacteremia 4/29/2022 Yes    Acute cholecystitis 4/29/2022 Yes    Acute kidney injury superimposed on CKD ([address] 4/29/2022 Yes      acute heather  CKD 3  Diabetes  CAD  RADHA on CKD  Hyperkalemia  Shock  Gram positive septicemia  sepsis  Assessment & Plan    4/24: Tegretol and aspirin for now as per surgery recommendation. For surgery for tomorrow. Sugars better controlled. She has right upper quadrant pain.   Possible sugar. Fasting means you cannot have food or drink for at least 8 hours before the test. This test can detect diabetes long before you may notice symptoms.    Glaucoma Screening:  · Glaucoma screening is performed by your eye doctor. The test measures the fluid pressure inside your eyes to determine if you have glaucoma.     Hepatitis C Screening:  · A blood test to see if you have the hepatitis C virus.  Hepatitis C attacks the liver and is a major cause of chronic liver disease.  Medicare will cover a single screening for all adults born between 1945 & 1965, or high risk patients (people who have injected illegal drugs or people who have had blood transfusions).  High risk patients who continue to inject illegal drugs can be screened for Hepatitis C every year.    Smoking and Tobacco-Use Cessation Counseling:  · Tobacco is the single greatest cause of disease and early death in our country today. Medication and counseling together can increase a person’s chance of quitting for good.   · Medicare covers two quitting attempts per year, with four counseling sessions per attempt (eight sessions in a 12 month period)    Preventive Screening tests for Women    Screening Mammograms and Breast Exams:  · An x-ray of your breasts to check for breast cancer before you or your doctor may be able to feel it.  If breast cancer is found early it can usually be treated with success.    Pelvic Exams and Pap Tests:  · An exam to check for cervical and vaginal cancer. A Pap test is a lab test in which cells are taken from your cervix and sent to the lab to look for signs of cervical cancer. If cancer of the cervix is found early, chances for a cure are good. Testing can generally end at age 65, or if a woman has a hysterectomy for a benign condition. Your provider may recommend more frequent testing if certain abnormal results are found.    Bone Mass Measurements:  · A painless x-ray of your bone density to see if you are at risk  cholecystectomy tomorrow. 4/25: Patient is going for lap heather today. Anticipate discharge tomorrow. No overnight events no new complaints. Continue current care.hold nephrotoxic agents, cw IVF.  4/26: Acute urinary retention s/p Colon, urology evaluation, renal function is worsening we will get nephrology evaluation, treat hyperkalemia. Spoke with nursing. 4/27: He upon entering room patient is lethargic, unable to arouse. Spoke with the  at bedside. Blood pressure is low, used  to speak with them. Transfer patient to ICU for hypotension/shock Patient was started on Flomax yesterday. We will get psych eval for polypharmacy. Patient mental status and condition is to be changed from yesterday. Spoke with cardiac critical care team about the care plan  CC time was 45 min  4/28: Spoke with the daughter-in-law, patient intubated and is on pressors. Taper down pressors as tolerated. Patient is on heparin drip for possible PE, renal function is improving. Questionable complete heart block. Follow cardiology, continue IV antibiotic for gram-positive septicemia with multiorgan failure,  4/29: Patient was extubated, spoke with the daughter at bedside. Continue current care. Patient is more stable compared to before. Follow-up ID continue with IV antibiotics, family would like SNF placement. PT OT ordered. 4/30: Patient complains of constipation, order lactulose, abdominal pain KUB. CT angio to rule out PE , IF PE is negative. DC heparin drip. Spoke with nursing about the care. 5/1: Stool for occult blood is negative. CT chest is negative for PE. Continue with DVT prophylaxis. Palliative for pain management. Continue with current care. No overnight events. Episode of nausea and vomiting today. Abdomen soft, nontender, + BS,   Electronically signed by Fco Castelan MD on 4/24/22 at 11:41 AM EDT    Subjective:  Symptoms:  She reports malaise and weakness.   No shortness of breath, for a broken bone. Bone density refers to the thickness of bones or how tightly the bone tissue is packed.    Preventive Screening tests for Men    Prostate Screening:  · Should you have a prostate cancer test (PSA)?  It is up to you to decide if you want a prostate cancer test. Talk to your clinician to find out if the test is right for you.  Things for you to consider and talk about should include:  · Benefits and harms of the test  · Your family history  · How your race/ethnicity may influence the test  · If the test may impact other medical conditions you have  · Your values on screenings and treatments    *Medicare pays for many preventive services to keep you healthy. For some of these services, you might have to pay a deductible, coinsurance, and / or copayment.  The amounts vary depending on the type of services you need and the kind of Medicare health plan you have.    For further details on screenings offered by Medicare please visit: https://www.medicare.gov/coverage/preventive-screening-services    cough, chest pain, headache, chest pressure, anorexia, diarrhea or anxiety. Diet:  No vomiting. Review of Systems   Respiratory: Negative for cough and shortness of breath. Cardiovascular: Negative for chest pain. Gastrointestinal: Negative for anorexia, diarrhea and vomiting. Neurological: Positive for weakness.

## 2022-05-01 NOTE — FLOWSHEET NOTE
Moved bowels x 4  Large amts   Med for nausea  Also had emisis   Stool for ob neg daughter at bedside

## 2022-05-02 NOTE — PROGRESS NOTES
Nephrology Progress Note    Assessment:  CKD-3 with asif  DM type-2  Anemia  Stable Hgb 8.2  JESICA  S/P heather  PAD    Plan:  Maintain evolemia  following labs  Avoid hypotension    Patient Active Problem List:     Major depressive disorder, recurrent, severe with psychotic symptoms ([address]     Diabetes mellitus ([address]     Hypertension     HLD (hyperlipidemia)     Thyroid disease     Congestive heart failure (HCC)     Non-pressure ulcer of toe (HCC)     Atherosclerotic PVD with intermittent claudication (HCC)     Osteomyelitis (HCC)     Infection of skin     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     Surgical wound dehiscence, initial encounter     S/P amputation of lesser toe, right (HCC)     Rectal bleeding     Athscl native arteries of left leg w ulceration oth prt foot (HCC)     JESICA (obstructive sleep apnea)     Secondary diabetes mellitus (HCC)     Chest pain     Peripheral vascular disease ([facility])     Cellulitis     Syncope and collapse     Nonrheumatic mitral (valve) insufficiency     Ischemic myocardial dysfunction     Pulmonary hypertension (HCC)     Acute on chronic combined systolic and diastolic congestive heart failure (HCC)     Asthma     Acute kidney injury superimposed on CKD (HCC)     Dizziness     Acute cerebrovascular accident ([address]     Abnormal gait     Anxiety     Gastritis, unspecified, without bleeding     Pure hypercholesterolemia     Schizophrenia ([address]     Coronary arteriosclerosis     Extrapyramidal disease     Gangrene of toe (HCC)     Drug-induced hypotension     Osteomyelitis of right foot ([facility])     Nausea and vomiting     Mild intermittent asthma without complication     Heart failure, diastolic, with acute decompensation ([facility])     Major depressive disorder, single episode, unspecified     Type 2 diabetes mellitus with diabetic neuropathy, unspecified ([facility])     Obesity (BMI 30-39. 9)     Disorder of carotid artery (HCC)     NSTEMI (non-ST elevated myocardial infarction) ([address]     Pneumonia CKD (chronic kidney disease) stage 3, GFR 30-59 ml/min ([facility])     Pain in right hand     Anesthesia of skin     Carpal tunnel syndrome     History of angioplasty of peripheral vessel     History of coronary artery bypass surgery     Paresthesia of skin     Acute decompensated heart failure (HCC)     Depression     Abdominal pain     Abdominal pain, right upper quadrant     Shock ([facility]     Gram-positive bacteremia     Acute cholecystitis      Subjective:  Admit Date: 4/22/2022    Interval History: alert    Medications:  Scheduled Meds:   dicyclomine  20 mg IntraMUSCular 4x Daily    miconazole   Topical BID    enoxaparin  30 mg SubCUTAneous BID    vancomycin  1,250 mg IntraVENous Q24H    [Held by provider] furosemide  40 mg IntraVENous BID    metoprolol  5 mg IntraVENous 4 times per day    insulin lispro  0-6 Units SubCUTAneous TID WC    insulin lispro  0-3 Units SubCUTAneous Nightly    [Held by provider] potassium chloride  20 mEq Oral BID WC    sodium chloride flush  5-40 mL IntraVENous 2 times per day    meropenem  1,000 mg IntraVENous Q8H    prazosin  1 mg Oral Nightly    fentanNYL  50 mcg IntraVENous Once    rocuronium  1 mg/kg IntraVENous Once    pantoprazole (PROTONIX) 40 mg injection  40 mg IntraVENous Daily    sennosides-docusate sodium  2 tablet Oral BID    vancomycin (VANCOCIN) intermittent dosing (placeholder)   Other RX Placeholder    dextrose bolus (hypoglycemia)  250 mL IntraVENous Once    sodium chloride flush  5-40 mL IntraVENous 2 times per day    sertraline  200 mg Oral Daily    montelukast  10 mg Oral Nightly    [Held by provider] metoprolol succinate  50 mg Oral BID    levothyroxine  50 mcg Oral Daily    [Held by provider] isosorbide dinitrate  20 mg Oral TID    [Held by provider] haloperidol  5 mg Oral Daily    [Held by provider] doxepin  150 mg Oral Nightly    [Held by provider] busPIRone  10 mg Oral BID    [Held by provider] atorvastatin  80 mg Oral Nightly    polyvinyl alcohol  1 drop Both Eyes BID    [Held by provider] ranolazine  1,000 mg Oral BID    [Held by provider] meclizine  25 mg Oral BID    [Held by provider] hydrALAZINE  50 mg Oral 3 times per day    insulin glargine  40 Units SubCUTAneous Nightly     Continuous Infusions:   sodium chloride      sodium chloride 25 mL (04/29/22 1850)    sodium chloride      dextrose         CBC:   Recent Labs     05/01/22  0600 05/02/22  0600   WBC 15.1* 11.8*   HGB 8.2* 8.2*    175     CMP:    Recent Labs     04/30/22  0600 04/30/22  0600 05/01/22  0600 05/02/22  0600     --  138 136   K 3.9  3.9   < > 4.8  4.8 4.8  4.8     --  104 103   CO2 21  --  21 21   BUN 27*  --  35* 45*   CREATININE 1.08*  --  1.40* 1.81*   GLUCOSE 159*  --  172* 162*   CALCIUM 8.2*  --  8.4* 8.5   LABGLOM 51.0*  --  37.8* 28.1*    < > = values in this interval not displayed. Troponin: No results for input(s): TROPONINI in the last 72 hours. BNP: No results for input(s): BNP in the last 72 hours. INR: No results for input(s): INR in the last 72 hours. Lipids: No results for input(s): CHOL, LDLDIRECT, TRIG, HDL, AMYLASE, LIPASE in the last 72 hours. Liver:   Recent Labs     05/02/22  0600   AST 39*   ALT 17   ALKPHOS 110   PROT 6.4   LABALBU 2.9*   BILITOT 0.6     Iron:  No results for input(s): IRONS, FERRITIN in the last 72 hours. Invalid input(s): LABIRONS  Urinalysis: No results for input(s): UA in the last 72 hours.     Objective:  Vitals: /75   Pulse 88   Temp 99 °F (37.2 °C)   Resp 20   Ht 5' 4\" (1.626 m)   Wt 245 lb (111.1 kg)   SpO2 100%   BMI 42.05 kg/m²    Wt Readings from Last 3 Encounters:   05/02/22 245 lb (111.1 kg)   04/20/22 225 lb (102.1 kg)   04/11/22 226 lb (102.5 kg)      24HR INTAKE/OUTPUT:      Intake/Output Summary (Last 24 hours) at 5/2/2022 0808  Last data filed at 5/2/2022 0657  Gross per 24 hour   Intake 2372.43 ml   Output 790 ml   Net 1582.43 ml       General: alert, in no apparent distress  HEENT: normocephalic, atraumatic, anicteric  Neck: supple, no mass  Lungs: non-labored respirations, clear to auscultation bilaterally  Heart: regular rate and rhythm, no murmurs or rubs  Abdomen: soft, non-tender, non-distended  Ext: no cyanosis, no peripheral edema  Neuro: alert and oriented, no gross abnormalities  Psych: normal mood and affect  Skin: no rash      Electronically signed by Amna Beckwith DO, MD

## 2022-05-02 NOTE — PROGRESS NOTES
Progress Note  Date:2022       [facility]:[id number]  Patient Armani Fay     YOB: 1955     Age:66 y.o.    ROS: Point review system cannot be obtained due to acuity of medical condition. Subjective    Patient still complaining of subjective 10/10 abdominal pain. Nausea early this morning without vomiting. Repeat CT abdomen this morning for persistent severe abdominal pain around a week postoperatively. Interaction completed via bedside  telemetry service. Objective         Vitals Last 24 Hours:  TEMPERATURE:  Temp  Av °F (36.1 °C)  Min: 95 °F (35 °C)  Max: 99 °F (37.2 °C)  RESPIRATIONS RANGE: Resp  Av  Min: 20  Max: 20  PULSE OXIMETRY RANGE: SpO2  Av %  Min: 100 %  Max: 100 %  PULSE RANGE: Pulse  Av.8  Min: 80  Max: 92  BLOOD PRESSURE RANGE: Systolic (14IWH), DXY:491 , Min:106 , IRP:856   ; Diastolic (09QJZ), VIH:73, Min:58, Max:83    I/O (24Hr): Intake/Output Summary (Last 24 hours) at 2022 2169  Last data filed at 2022 4925  Gross per 24 hour   Intake 2372.43 ml   Output 790 ml   Net 1582.43 ml     Physical Exam:  Constitutional: Uncomfortable appearing  female. Family present at bedside. Head: NCAT  Eyes: PERRLA  Cardiovascular: RRR. No significant murmurs appreciated. Pulmonary: Normal rate and effort respiration on O2 by NC  Abdomen: Soft, obese, nontense abdomen. Postoperative drain in place with serosanguineous output. Epigastric and bilateral lower quadrant tenderness to gentle palpation. Nontympanic. Neurologic: Mildly somnolent. No focal neurologic deficits appreciated. Psychiatric: Distressed by pain.   Cooperative with exam.      Labs/Imaging/Diagnostics    Labs:  CBC:  Recent Labs     22  0558 22  0600 22  0600   WBC 11.2* 15.1* 11.8*   RBC 3.16* 3.34* 3.27*   HGB 7.8* 8.2* 8.2*   HCT 23.6* 24.9* 24.6*   MCV 74.7* 74.4* 75.2*   RDW 19.8* 19.9* 19.2*    176 175 CHEMISTRIES:  Recent Labs     04/30/22  0600 04/30/22  0600 05/01/22  0600 05/02/22  0600     --  138 136   K 3.9  3.9   < > 4.8  4.8 4.8  4.8     --  104 103   CO2 21  --  21 21   BUN 27*  --  35* 45*   CREATININE 1.08*  --  1.40* 1.81*   GLUCOSE 159*  --  172* 162*   PHOS 2.5  --  2.9 3.5   MG 1.6*  --   --   --     < > = values in this interval not displayed. PT/INR:  No results for input(s): PROTIME, INR in the last 72 hours. APTT:  No results for input(s): APTT in the last 72 hours. LIVER PROFILE:  Recent Labs     04/30/22 0600 05/01/22 0600 05/02/22 0600   AST 41* 41* 39*   ALT 16 17 17   BILITOT 0.6 0.6 0.6   ALKPHOS 86 101 110       Imaging Last 24 Hours:  CT ABDOMEN PELVIS WO CONTRAST Additional Contrast? None    Result Date: 4/23/2022  EXAM:  CT ABDOMEN PELVIS WO CONTRAST History: Abdominal pain Technique: Multiple contiguous axial images were obtained of the abdomen and pelvis from the level of the lung bases through the ischial tuberosities without contrast. Multiplanar reformats were obtained. Comparison: CT abdomen pelvis April 20, 2022 Findings: Lung bases are clear. Heart size is enlarged. Mitral annular calcification. Evidence of prior thoracic surgery. Lack of intravenous contrast precludes optimal evaluation of the abdominal and pelvic viscera. The unenhanced liver, spleen, stomach, pancreas, and adrenal glands appear within normal limits. The gallbladder is mildly distended but not significant changed from recent CT. There are debris is present within the gallbladder lumen. No gallbladder wall thickening or pericholecystic fluid identified by CT. Mild bilateral perinephric stranding. No urinary tract calculi or hydronephrosis. The urinary bladder is decompressed. The uterus is absent. Abdominal aorta is nonaneurysmal  . No retroperitoneal or abdominal/pelvic lymphadenopathy. No small bowel obstruction. No overt colonic mass or pericolonic inflammation.  No findings of acute appendicitis No free fluid, loculated fluid collection, or pneumoperitoneum. No acute osseous abnormality. Mildly distended gallbladder containing gallbladder sludge and/or tiny gallstones without significant interval change since April 20, 2022 CT. No CT findings of acute cholecystitis. Mild bilateral perinephric stranding is nonspecific. Correlation for polynephritis is recommended. All CT scans at this facility use dose modulation, iterative reconstruction, and/or weight based dosing when appropriate to reduce radiation dose to as low as reasonably achievable. US ABDOMEN LIMITED    Result Date: 4/23/2022  EXAM: US ABDOMEN LIMITED HISTORY: Right upper quadrant pain TECHNIQUE: Ultrasound evaluation was performed of the right upper quadrant of the abdomen. COMPARISON: CT abdomen pelvis April 23, 2022 FINDINGS: Normal echogenicity and contour of the liver. No liver lesion or intrahepatic biliary dilatation. Main portal vein is patent. The gallbladder is mildly distended. Layering echogenic material is identified within the lumen of the gallbladder. No pericholecystic fluid. Gallbladder wall thickness is at the upper limits of normal measuring 2.7 mm. Negative Velez sign reported. Common bile duct is normal in diameter measuring 4 mm. No overt abnormality of the pancreas. Nonspecific findings of the gallbladder including mildly distended gallbladder containing layering sludge and/or tiny gallstones with wall thickness of the upper limits of normal measuring 2.7 mm. No pericholecystic fluid to suggest acute cholecystitis.      Assessment//Plan           Hospital Problems           Last Modified POA    * (Principal) Abdominal pain 4/23/2022 Yes    Abdominal pain, right upper quadrant 4/25/2022 Yes    Shock ([address] 4/29/2022 Yes    Gram-positive bacteremia 4/29/2022 Yes    Acute cholecystitis 4/29/2022 Yes    Acute kidney injury superimposed on CKD ([address] 4/29/2022 Yes            Assessment & Plan    Assessment:  · Acute cholecystitis, s/p Lap Heather 4/25  · Severe abdominal pain in setting of above, persistent post-operatively  · CKD 3  · Diabetes  · CAD  · RADHA on CKD  · Hyperkalemia  · Shock - resolved  · Gram positive septicemia  · Sepsis    Updates to Plan:  4/24: Tegretol and aspirin for now as per surgery recommendation. For surgery for tomorrow. Sugars better controlled. She has right upper quadrant pain. Possible cholecystectomy tomorrow. 4/25: Patient is going for lap heather today. Anticipate discharge tomorrow. No overnight events no new complaints. Continue current care.hold nephrotoxic agents, cw IVF.  4/26: Acute urinary retention s/p Colon, urology evaluation, renal function is worsening we will get nephrology evaluation, treat hyperkalemia. Spoke with nursing. 4/27: He upon entering room patient is lethargic, unable to arouse. Spoke with the  at bedside. Blood pressure is low, used  to speak with them. Transfer patient to ICU for hypotension/shock Patient was started on Flomax yesterday. We will get psych eval for polypharmacy. Patient mental status and condition is to be changed from yesterday. Spoke with cardiac critical care team about the care plan  CC time was 45 min  4/28: Spoke with the daughter-in-law, patient intubated and is on pressors. Taper down pressors as tolerated. Patient is on heparin drip for possible PE, renal function is improving. Questionable complete heart block. Follow cardiology, continue IV antibiotic for gram-positive septicemia with multiorgan failure,  4/29: Patient was extubated, spoke with the daughter at bedside. Continue current care. Patient is more stable compared to before. Follow-up ID continue with IV antibiotics, family would like SNF placement. PT OT ordered. 4/30: Patient complains of constipation, order lactulose, abdominal pain KUB. CT angio to rule out PE , IF PE is negative. DC heparin drip.  Spoke with nursing about the care. 5/1: Stool for occult blood is negative. CT chest is negative for PE. Continue with DVT prophylaxis. Palliative for pain management. Continue with current care. No overnight events. Episode of nausea and vomiting today. Abdomen soft, nontender, + BS,   5/2: Repeat CT abdomen without contrast (in setting of RADHA) demonstrating focal findings concerning for postoperative abscess versus hematoma of anterior abdominal wall. Colorectal surgery reconsulted, reviewed images, feels most consistent with postoperative intramuscular small hematomas, with no indication for surgical intervention. They further report that patient clinically appears unchanged from prior to cholecystectomy surgery, at which time she endorsed identical severe abdominal pain and nausea of unclear etiology. Palliative care service consulted for pain management, adjusted pain control regimen with some improvement. Patient reportedly chronically taking Percocet 5/325 every 4 hours as needed while at home. Consider GI consult for possible endoscopy if no symptom improvement by next day.     Brook Conway, DO

## 2022-05-02 NOTE — CONSULTS
[address] [place] [address], Department of Psychiatry  Behavioral Health Consult    REASON FOR CONSULT: Psych medication review    CONSULTING PHYSICIAN:     History obtained from: patient    HISTORY OF PRESENT ILLNESS:      The patient is a 61 y.o. female with significant past psychiatric history of Schizoaffective disorder who presents with abdominal pain. Pt was initially in ICU where I attempted to see her. Pt seen today with the help of interpretor with family at bedside. Pt report that she has a long history of mental illness and see NP at [facility]. Pt has been feeling anxious than depressed sec to her ongoing medical condition. She has been stable taking zoloft, haldol. Pt report hearing voices when not taking haldol and she denies hearing any voices currently. Pt denies any active SI or HI. Pt wanted her medication to be restarted. Denies any paranoid thoughts         The patient is currently receiving care for the above psychiatric illness.       Psychiatric Review of Systems       Depression: denies     Radha or Hypomania:  no     Panic Attacks:  yes - anxious     Phobias:  no     Obsessions and Compulsions:  no     PTSD : no     Hallucinations:  no     Delusions:  no      Substance Abuse History:  ETOH: no  Marijuana: no  Opiates: no  Other Drugs: no      Past Psychiatric History:  Schizoaffective disoder    Past Medical History:        Diagnosis Date    Asthma     CAD (coronary artery disease)     CKD (chronic kidney disease) stage 3, GFR 30-59 ml/min ([facility])     Colitis     Diabetes mellitus ([address]     Hyperlipidemia     Hypertension     PAD (peripheral artery disease) ([facility])     Prolonged emergence from general anesthesia     PVD (peripheral vascular disease) ([address]     Thyroid disease        Past Surgical History:        Procedure Laterality Date    CARDIAC SURGERY      CATARACT REMOVAL WITH IMPLANT Bilateral 11- 11-     SECTION      CHOLECYSTECTOMY, LAPAROSCOPIC N/A 4/25/2022    LAPAROSCOPIC CHOLECYSTECTOMY performed by Joanie Benson MD at [facility] 8/20/2019    COLONOSCOPY DIAGNOSTIC performed by Loki Samayoa MD at [address] [place] [address]  06/2019    unknown vessels    HYSTERECTOMY      TOE AMPUTATION Right     3rd toe       Medications Prior to Admission:   Medications Prior to Admission: dicyclomine (BENTYL) 10 MG capsule, Take 1 capsule by mouth every 6 hours as needed (cramps)  ondansetron (ZOFRAN ODT) 4 MG disintegrating tablet, Take 1 tablet by mouth every 8 hours as needed for Nausea  albuterol sulfate HFA (VENTOLIN HFA) 108 (90 Base) MCG/ACT inhaler, Inhale 2 puffs into the lungs as needed for Wheezing Every 4-6 hours PRN  albuterol (PROVENTIL) (2.5 MG/3ML) 0.083% nebulizer solution, Take 3 mLs by nebulization every 6 hours as needed for Wheezing  montelukast (SINGULAIR) 10 MG tablet, Take 1 tablet by mouth nightly  Continuous Blood Gluc Sensor (FREESTYLE FELY 14 DAY SENSOR) [facility], Every 2 weeks  insulin glargine (LANTUS SOLOSTAR) 100 UNIT/ML injection pen, 60 units at bedtime  insulin lispro, 1 Unit Dial, (HUMALOG KWIKPEN) 100 UNIT/ML SOPN, 15  units at each meals  Dulaglutide (TRULICITY) 1.33 MI/8.3UG SOPN, Inject 0.75 mg into the skin once a week  levothyroxine (SYNTHROID) 50 MCG tablet, take 1 tablet by mouth once daily  oxybutynin (DITROPAN-XL) 5 MG extended release tablet, take 1 tablet by mouth once daily  atorvastatin (LIPITOR) 40 MG tablet, take 2 tablets by mouth daily  busPIRone (BUSPAR) 10 MG tablet,   doxepin (SINEQUAN) 100 MG capsule,   prazosin (MINIPRESS) 2 MG capsule,   Alcohol Swabs (ALCOHOL PREP) 70 % PADS, qid  blood glucose test strips (FREESTYLE LITE) strip, 1 each by In Vitro route daily As needed.   Continuous Blood Gluc Sensor (FREESTYLE FELY 14 DAY SENSOR) MIS, Every 2 weeks  Continuous Blood Gluc  (FREESTYLE FELY 14 DAY READER) CATHLEEN, As directed  FreeStyle Lancets MISC, 1 each by Does not apply route daily  Insulin Pen Needle ([employer] PEN NEEDLES 31G) 31G X 8 MM MISC, 1 each by Does not apply route 4 times daily  oxyCODONE-acetaminophen (PERCOCET) 5-325 MG per tablet, Take 1 tablet by mouth every 4 hours as needed for Pain. Insulin Pen Needle (NOVOFINE) 32G X 6 MM MISC, qid  ammonium lactate (LAC-HYDRIN) 12 % lotion,   atorvastatin (LIPITOR) 80 MG tablet, take 1 tablet by mouth at bedtime  diclofenac sodium (VOLTAREN) 1 % GEL, apply 4 grams to affected area three times a day AS NEEDED FOR PAIN  doxepin (SINEQUAN) 25 MG capsule, 75 mg nightly   ranolazine (RANEXA) 1000 MG extended release tablet, Take 1 tablet by mouth 2 times daily  gabapentin (NEURONTIN) 600 MG tablet, Take 600 mg by mouth 2 times daily.   RESTASIS 0.05 % ophthalmic emulsion,   neomycin-polymyxin-dexameth (MAXITROL) 3.5-92078-7.1 ophthalmic suspension, instill 1 drop into both eyes four times a day  ARTIFICIAL TEARS 1.4 % ophthalmic solution,   docusate sodium (COLACE, DULCOLAX) 100 MG CAPS, Take 100 mg by mouth 2 times daily (Patient taking differently: Take 200 mg by mouth daily At bedtime.)  sertraline (ZOLOFT) 100 MG tablet, Take 200 mg by mouth daily   furosemide (LASIX) 40 MG tablet, Take 1 tablet by mouth daily  ticagrelor (BRILINTA) 90 MG TABS tablet, Take 90 mg by mouth 2 times daily  CPAP Machine MISC, by Does not apply route New CPAP with 10 cm  aspirin 81 MG EC tablet, Take 1 tablet by mouth daily  OXYGEN, 2 lit O2 with sleep , please give O2 concentrator  Emollient (EUCERIN INTENSIVE REPAIR HAND) 2.5-10 % CREA, APPLY TO FEET AT BEDTIME; PLACE SOCKS OVER FEET AFTER APPLICATION IF NEEDED FOR DRY CRACKING FEET  hydrOXYzine (VISTARIL) 25 MG capsule, Take 50 mg by mouth 3 times daily as needed   Nutritional Supplements (GLUCERNA SHAKE) LIQD, take as directed three times a day  isosorbide dinitrate (ISORDIL) 20 MG tablet, Take 1 tablet by mouth 3 times daily  nitroGLYCERIN (NITROSTAT) 0.4 MG SL tablet, up to max of 3 total doses. If no relief after 1 dose, call 911.  hydrALAZINE (APRESOLINE) 50 MG tablet, Take 1 tablet by mouth every 8 hours  metoprolol succinate (TOPROL XL) 50 MG extended release tablet, Take 1 tablet by mouth 2 times daily  haloperidol (HALDOL) 5 MG tablet, Take 5 mg by mouth daily  folic acid (FOLVITE) 1 MG tablet, Take 1 mg by mouth daily  Iron Polysacch Olkfp-N28-VG (NIFEREX-150 FORTE PO), Take 1 tablet by mouth daily   Cyanocobalamin (VITAMIN B-12) 1000 MCG extended release tablet, Take 1,000 mcg by mouth daily  meclizine (ANTIVERT) 25 MG tablet, Take 25 mg by mouth 2 times daily     Allergies:  Ambien [zolpidem tartrate], Capoten [captopril], Clioquinol, Cogentin [benztropine], Depakote [divalproex sodium], Effexor xr [venlafaxine hcl er], Geodon [ziprasidone hcl], Lisinopril, Lyrica [pregabalin], Navane [thiothixene], Pamelor [nortriptyline hcl], Remeron [mirtazapine], Risperdal [risperidone], Trazodone and nefazodone, and Wellbutrin [bupropion]    FAMILY/SOCIAL HISTORY:  History reviewed. No pertinent family history. Social History     Socioeconomic History    Marital status:      Spouse name: Not on file    Number of children: Not on file    Years of education: Not on file    Highest education level: Not on file   Occupational History    Not on file   Tobacco Use    Smoking status: Never Smoker    Smokeless tobacco: Never Used   Vaping Use    Vaping Use: Never used   Substance and Sexual Activity    Alcohol use: Never    Drug use: Never    Sexual activity: Not on file   Other Topics Concern    Not on file   Social History Narrative         Lives With: Spouse    Type of Home: [address] [place], [place] [address] in [address]: One level    Home Access: Elevator    Bathroom Shower/Tub: Tub/Shower unit(Simultaneous filing.  User may not have seen previous data.)    Bathroom Equipment: Grab bars in shower, Shower chair    Home Equipment: Rolling walker, [facility] 450 bed    ADL Assistance: Needs assistance    Homemaking Assistance: Needs assistance    Homemaking Responsibilities: No    Ambulation Assistance: Independent    Transfer Assistance: Independent    Active : No    Additional Comments: Pt wears orthopedic shoes at baseline    The patient states she is current with [facility](RN per the ). The patient had a walker, but obtained a hospital bed, wheel chair, BSC and O2 last admission (from [address]). pharmacy is [address] [place] [address],[address] [address]., [facility]. [facility] is provided by [facility]	Moe Wise () per the patient     Social Determinants of Health     Financial Resource Strain:     Difficulty of Paying Living Expenses: Not on file   Food Insecurity:     Worried About [address] [employer] in the Last Year: Not on file    Shayy of Food in the Last Year: Not on file   Transportation Needs:     Lack of Transportation (Medical): Not on file    Lack of Transportation (Non-Medical):  Not on file   Physical Activity:     Days of Exercise per Week: Not on file    Minutes of Exercise per Session: Not on file   Stress:     Feeling of Stress : Not on file   Social Connections:     Frequency of Communication with Friends and Family: Not on file    Frequency of Social Gatherings with Friends and Family: Not on file    Attends Baptism Services: Not on file    Active Member of [address] [place] [address] or Organizations: Not on file    Attends Club or Organization Meetings: Not on file    Marital Status: Not on file   Intimate Partner Violence:     Fear of Current or Ex-Partner: Not on file    Emotionally Abused: Not on file    Physically Abused: Not on file    Sexually Abused: Not on file   Housing Stability:     Unable to Pay for Housing in the Last Year: Not on file    Number of [place] in the Last Year: Not on file    Unstable Housing in the Last Year: Not on file       REVIEW OF SYSTEMS Constitutional: [] fever  [] chills  [] weight loss  []weakness [] Other:  Eyes:  [] photophobia  [] discharge [] acuity change   [] Diplopia   [] Other:  HENT:  [] sore throat  [] ear pain [] Tinnitus   [] Other  Respiratory:  [] Cough  [] Shortness of breath   [] Sputum   [] Other:   Cardiac: []Chest pain   []Palpitations []Edema  []PND  [] Other:  GI:  []Abdominal pain   []Nausea  []Vomiting  []Diarrhea  [] Other:  :  [] Dysuria   []Frequency  []Hematuria  []Discharge  [] Other:  Possible Pregnancy: []Yes   []No   LMP:   Musculoskeletal:  []Back pain  []Neck pain  []Recent Injury   Skin:  []Rash  [] Itching  [] Other:  Neurologic:  [] Headache  [] Focal weakness  [] Sensory changes []Other:  Endocrine:  [] Polyuria  [] Polydipsia  [] Hair Loss  [] Other:  Lymphatic:   [] Swollen glands   Psychiatric:  As per HPI      All other systems negative except as marked or mentioned/indicated in the HPI. David Mina      PHYSICAL EXAM:  Vitals:  BP 94/60   Pulse 74   Temp 95 °F (35 °C) (Oral)   Resp 18   Ht 5' 4\" (1.626 m)   Wt 245 lb (111.1 kg)   SpO2 100%   BMI 42.05 kg/m²      Neuro Exam:   Muscle Strength & Tone: full ROM  Gait: normal gait   Involuntary Movements: No    Mental Status Examination:    Level of consciousness:  within normal limits   Appearance:  ill-appearing  Behavior/Motor:  psychomotor retardation  Attitude toward examiner:  attentive  Speech:  slow   Mood: anxious  Affect:  mood congruent  Thought processes:  coherent   Association  Thought content:  Suicidal Ideation:  denies suicidal ideation  Cognition:  oriented to person, place, and time   Attention & Concentration intact  Memory intact  Mini Mental Status - 30/30  Insight poor   Judgement fair   Fund of Knowledge adequate      DIAGNOSIS:    Schizoaffective disorder depressive type  Anxiety disorder unspecified        RISK ASSESSMENT:        LABS: REVIEWED TODAY:  Recent Labs     04/30/22  0558 05/01/22  0600 05/02/22  0600   WBC 11.2* 15.1* 11.8*   HGB 7.8* 8.2* 8.2*    176 175     Recent Labs     04/30/22  0600 04/30/22  0600 05/01/22  0600 05/02/22  0600     --  138 136   K 3.9  3.9   < > 4.8  4.8 4.8  4.8     --  104 103   CO2 21  --  21 21   BUN 27*  --  35* 45*   CREATININE 1.08*  --  1.40* 1.81*   GLUCOSE 159*  --  172* 162*    < > = values in this interval not displayed. Recent Labs     04/30/22  0600 05/01/22  0600 05/02/22  0600   BILITOT 0.6 0.6 0.6   ALKPHOS 86 101 110   AST 41* 41* 39*   ALT 16 17 17     Lab Results   Component Value Date    LABAMPH Neg 04/21/2020    BARBSCNU Neg 04/21/2020    LABBENZ POSITIVE 04/21/2020    LABMETH Neg 04/21/2020    OPIATESCREENURINE Neg 04/21/2020    PHENCYCLIDINESCREENURINE Neg 04/21/2020    ETOH <10 04/21/2020     Lab Results   Component Value Date    TSH 1.470 07/01/2021     No results found for: LITHIUM  No results found for: VALPROATE, CBMZ  No results found for: LITHIUM, VALPROATE    FURTHER LABS ORDERED :      Radiology   ECHO Complete 2D W Doppler W Color    Result Date: 4/27/2022  Transthoracic Echocardiography Report (TTE)  Demographics   Patient Name   Rise Ridge       Gender               Female                 Levonne Shell   Patient Number [id number]            Race                 Other                                      Ethnicity             or    Visit Number   [id number]           Room Number          IC07   Corporate ID                       Date of Study        04/27/2022   Accession      [id number]          Referring Physician  Number   Date of Birth  1955          Sonographer          Kaylan Jennings   Age            61 year(s)          Interpreting         [facility]                                     Physician            Jadon Smiley MD  Procedure Type of Study   TTE procedure:ECHO COMPLETE 2D W/DOP W/COLOR.   Procedure Date Date: 04/27/2022 Start: 09:55 AM Study Location: Portable Technical Quality: Adequate visualization Indications:Congestive heart failure. Patient Status: Routine Height: 64 inches Weight: 242 pounds BSA: 2.12 m^2 BMI: 41.54 kg/m^2 BP: 104/46 mmHg  Conclusions   Summary  Left ventricular ejection fraction is visually estimated at 45-50%. Near akinesis of septum, hypokinesis of septal aspect of apex and distal  most anteroseptal wall. Overall LVEF seems a bit better than 8/2021  Right ventricle global systolic function is mildly reduced . Moderately enlarged right ventricle cavity. Right ventricular systolic pressure of 47 mm Hg consistent with moderate  pulmonary hypertension. 8/2021 RVSP was 60 mm Hg  Mildly dilated left atrium. Markedly enlarged right atrium size. Normal mitral valve structure  3+ MR  Normal aortic valve structure and function. Normal tricuspid valve structure  Severe tricuspid regurgitation. Signature   ----------------------------------------------------------------  Electronically signed by Kenn White MD(Interpreting  physician) on 04/27/2022 05:08 PM  ----------------------------------------------------------------   Findings  Left Ventricle Left ventricular ejection fraction is visually estimated at 45-50%. Near akinesis of septum, hypokinesis of septal aspect of apex and distal most anteroseptal wall. Overall LVEF seems a bit better than 8/2021 Right Ventricle Right ventricle global systolic function is mildly reduced . Moderately enlarged right ventricle cavity. Right ventricular systolic pressure of 47 mm Hg consistent with moderate pulmonary hypertension. 8/2021 RVSP was 60 mm Hg Left Atrium Mildly dilated left atrium. Right Atrium Markedly enlarged right atrium size. Mitral Valve Normal mitral valve structure 3+ MR Tricuspid Valve Normal tricuspid valve structure Severe tricuspid regurgitation. Aortic Valve Normal aortic valve structure and function. Pulmonic Valve The pulmonic valve was not well visualized . Pericardial Effusion No evidence of pericardial effusion.  Pleural Effusion No evidence of pleural effusion. Aorta \ Miscellaneous Aortic root dimension within normal limits. M-Mode Measurements (cm)   LVIDd: 4.63 cm                         LVIDs: 3.58 cm  IVSd: 1.18 cm                          IVSs: 1.45 cm  LVPWd: 1.42 cm                         AO Root Dimension: 2.54 cm  Rt. Vent. Dimension: 3.32 cm                                         LVOT: 1.98 cm  Doppler Measurements:   TR Velocity:3.04 m/s  TR Gradient:37.03 mmHg                                     Estimated RAP:10 mmHg                                     RVSP:47.03 mmHg  Valves  Tricuspid Valve   Estimated RVSP: 47.03 mmHg              Estimated RAP: 10 mmHg  TR Velocity: 3.04 m/s                   TR Gradient: 37.03 mmHg   Pulmonic Valve            Estimated PASP: 47.03 mmHg   LVOT   LVOT Diameter: 1.98 cm  Structures  Left Ventricle   Diastolic Dimension: 8.51 cm         Systolic Dimension: 9.96 cm  Septum Diastolic: 7.41 cm            Septum Systolic: 7.36 cm  PW Diastolic: 2.44 cm                                       FS: 22.7 %  LV EDV/LV EDV Index: 98.85 ml/47 m^2 LV ESV/LV ESV Index: 53.69 ml/25 m^2  EF Calculated: 45.7 %   LVOT Diameter: 1.98 cm   Right Atrium   RA Systolic Pressure: 10 mmHg   Right Ventricle   Diastolic Dimension: 8.47 cm                                    RV Systolic Pressure: 92.66 mmHg  Aorta/ Miscellaneous Aorta   Aortic Root: 2.54 cm  LVOT Diameter: 1.98 cm      CT ABDOMEN PELVIS WO CONTRAST Additional Contrast? Radiologist Recommendation    Result Date: 5/2/2022  COMPARISON: April 20, 2022; April 23, 2022 HISTORY:  Severe abdominal pain s/p Lap Teri 4/25 COMMENTS: R10.11 Abdominal pain, right upper quadrant ICD10 TECHNIQUE: procedure Thin slice spiral CT was performed from the lung bases through the iliac crests without contrast administration. Contrast enhancement was not employed due to clinician's order. Sagittal and coronal reconstructions were also performed.  FINDINGS: Images through the lung bases demonstrate groundglass opacities and/or atelectasis. In the subcutaneous fat over the lateral aspect of the lower chest and abdomen there is infiltration and also a fluid collection is seen adjacent to the muscle that measures 12.4 x 4.9 x 15.27 cm. The gallbladder is surgically absent and a drain is seen in the gallbladder fossa exiting the anterior abdomen on the right. Non-contrast images of the liver shows that it appears to be decreased in attenuation with no intrahepatic ductal dilation seen. The pancreas, spleen and adrenals are unremarkable. No hydro-nephrosis, renal calculi or ijeoma-nephric fluid seen in the kidneys. Mild perinephric stranding is seen about both kidneys. No aneurysm  is visualized. Diffuse atherosclerotic calcifications are visualized. No  dilated loops of bowel, free air or free fluid is seen there is mild stranding seen lateral to the descending colon. The visualized portion of the appendix is unremarkable. . An umbilical hernia is seen that contains fat. A Colon catheter is seen in the urinary bladder. No destructive bony lesions are seen. There is evidence of median sternotomy. 1. Fluid collection is seen over the lower lateral aspect of the chest and also abdomen adjacent to the muscle. This could represent abscess. No contrast was administered. There also is infiltration of the fat anterior to the fluid collection. 2. No evidence for appendicitis, diverticulitis or obstruction 3. Patient is status post cholecystectomy and drains are seen in place on the right. All CT scans at this facility use dose modulation, iterative reconstruction, and/or weight based dosing when appropriate to reduce radiation dose to as low as reasonably  achievable.     CT ABDOMEN PELVIS WO CONTRAST Additional Contrast? None    Result Date: 4/23/2022  EXAM:  CT ABDOMEN PELVIS WO CONTRAST History: Abdominal pain Technique: Multiple contiguous axial images were obtained of the abdomen and pelvis from the level of the lung bases through the ischial tuberosities without contrast. Multiplanar reformats were obtained. Comparison: CT abdomen pelvis April 20, 2022 Findings: Lung bases are clear. Heart size is enlarged. Mitral annular calcification. Evidence of prior thoracic surgery. Lack of intravenous contrast precludes optimal evaluation of the abdominal and pelvic viscera. The unenhanced liver, spleen, stomach, pancreas, and adrenal glands appear within normal limits. The gallbladder is mildly distended but not significant changed from recent CT. There are debris is present within the gallbladder lumen. No gallbladder wall thickening or pericholecystic fluid identified by CT. Mild bilateral perinephric stranding. No urinary tract calculi or hydronephrosis. The urinary bladder is decompressed. The uterus is absent. Abdominal aorta is nonaneurysmal  . No retroperitoneal or abdominal/pelvic lymphadenopathy. No small bowel obstruction. No overt colonic mass or pericolonic inflammation. No findings of acute appendicitis No free fluid, loculated fluid collection, or pneumoperitoneum. No acute osseous abnormality. Mildly distended gallbladder containing gallbladder sludge and/or tiny gallstones without significant interval change since April 20, 2022 CT. No CT findings of acute cholecystitis. Mild bilateral perinephric stranding is nonspecific. Correlation for polynephritis is recommended. All CT scans at this facility use dose modulation, iterative reconstruction, and/or weight based dosing when appropriate to reduce radiation dose to as low as reasonably achievable. XR CHEST (2 VW)    Result Date: 4/28/2022  EXAMINATION: Chest x-ray, 2 view HISTORY: Acute respiratory failure TECHNIQUE: Frontal and lateral views of the chest COMPARISON: Portable chest radiograph April 27, 2022 FINDINGS: Interval removal of the endotracheal tube and enteric tube. Right and left jugular catheters remain. Heart size is enlarged.  Interval development of right midlung and right lung base opacities. No significant interval change of left lung opacities. No pneumothorax. No acute osseous abnormality. Interval development of right lung infiltrate and/or atelectasis. Otherwise no significant interval change. XR ABDOMEN (KUB) (SINGLE AP VIEW)    Result Date: 4/30/2022  EXAMINATION: XR ABDOMEN (KUB) (SINGLE AP VIEW) CLINICAL HISTORY:  abd pain COMPARISONS:  NONE AVAILABLE TECHNIQUE:  Anterior x-ray views of the abdomen and pelvis. FINDINGS: There is a surgical drain in the right upper quadrant. There is a Colon catheter in place. There are surgical skin staples in the midline and in the left upper and left lower quadrants. The bowel gas pattern is without evidence of obstruction or distended bowel. There is retained fecal material throughout the colon consistent with constipation. No abnormal calcifications. The soft tissues are within normal limits. No acute osseous findings. There are no acute intra-abdominal changes. There is a surgical drain in the right upper quadrant and there are surgical skin staples at several locations in the anterior abdomen. CT HEAD WO CONTRAST    Result Date: 4/27/2022  CT Brain. Contrast medium:  without contrast.. History:  Nonresponsive. Technical factors: CT imaging of the brain was obtained and formatted as 5 mm contiguous axial images. 2.5 mm contiguous axial images were obtained through the osseous structures. Sagittal and coronal reconstruction obtained during postprocessing. Comparison:  CT brain May 6, 2020, April 21, 2020. MRI brain May 7, 2020. Findings: Extra-axial spaces:  Normal. Intracranial hemorrhage:  None. Ventricular system: [Negative. Basal Cisterns:  Without anomaly. Cerebral Parenchyma: Without anomaly. Midline Shift:  None. Cerebellum:  No anomaly identified. Paranasal sinuses and mastoid air cells:  Small air-fluid levels, bilateral maxillary sinuses, left sphenoid sinus.  Partial opacification ethmoid sinuses. Visualized Orbits:  Negative. Impression: Pansinusitis. All CT scans at this facility use dose modulation, iterative reconstruction, and/or weight based dosing when appropriate to reduce radiation dose to as low as reasonably achievable. CTA CHEST W WO CONTRAST    Result Date: 4/30/2022  EXAM:CTA CHEST W WO CONTRAST DATE4/30/2022 8:51 AM: REASON FOR EXAM:Acute severe anterior chest wall pain. r/o PE COMPARISON: none Technique: Helical CT was performed through the chest following the IV administration of100  cc of nonionic contrast. 3-D MIP reconstructions were performed on an independent workstation in the coronal plane with thick slab technique utilized in the interpretation. 3-D maximum intensity projection performed. All CT scans at this facility use dose modulation, iterative reconstruction, and/or weight based dosing when appropriate to reduce radiation dose to as low as reasonably achievable. CHEST CTA  FINDINGS: Mediastinum: There are mild hilar and mediastinal lymph nodes, likely related to reactive nodes. The chest wall and lower neck are normal Heart: The heart is enlarged, cardiomegaly. There is no pericardial effusion. Vascular structures: There is no evidence for thoracic aortic aneurysm or dissection. No evidence of traumatic aortic injury. There are no persistent filling defects within the pulmonary arteries. Lungs: There are patchy groundglass alveolar alveolar opacities in both lungs predominantly in the lung bases which are nonspecific for alveolitis, pneumonitis. Pleura: No pleural effusion or thickening. Upper abdomen: The visualized portions of the upper abdomen are unremarkable. Bones/axillae/soft tissues: Osseous structures and chest wall are normal.     Negative CTA of the chest. No evidence of thoracic aortic dissection or aneurysm. The study is negative for pulmonary emboli.  There are nonspecific groundglass opacities in both lungs which may secondary to inflammation, infection. There are shotty mediastinal lymphadenopathy. NM LUNG SCAN PERFUSION ONLY    Result Date: 4/28/2022  EXAMINATION: PERFUSION ONLY SCINTIGRAPHY CLINICAL HISTORY: 74-year-old with new onset acidosis, worsening renal failure and cardiac disease. Patient has been intubated. TECHNIQUE: 5.1 mCi of technetium labeled MAA were utilized for pulmonary perfusion only scintigraphy. Planar images of the chest were obtained in the anterior, posterior, lateral, and oblique planes. Comparison made with a AP chest x-ray from 4/28/2022 which demonstrates bilateral pulmonary opacities in both mid and lower lung zones. FINDINGS: Perfusion images are abnormal. There are perfusion defects involving the lingula and basilar segments of the left lower lobe. There are also perfusion defects involving much of the right lower lobe. No ventilation images to assess for a matching defects. ABNORMAL PERFUSION SCINTIGRAPHY WITH BILATERAL PERFUSION DEFECTS. THEREFORE, THE STUDY IS INDETERMINATE FOR ASSESSMENT OF ACUTE PULMONARY EMBOLUS    CT ABDOMEN PELVIS W IV CONTRAST Additional Contrast? None    Result Date: 4/20/2022  EXAMINATION: CT ABDOMEN PELVIS W IV CONTRAST DATE AND TIME:4/20/2022 7:19 AM CLINICAL HISTORY: Acute abdominal pain. Epigastric pain. abd and chest pain x 2 days  COMPARISON: August 15, 2021 TECHNIQUE: Contiguous axial CT sections of the abdomen and pelvis. 100 cc's of IV contrast given. .  All CT scans at this facility use dose modulation, iterative reconstruction, and/or weight based dosing when appropriate to reduce radiation dose to as low as reasonably achievable. Some of this report was completed using  Power [employer]ribe 520 [id number] and may include unintended errors with respect to translation of words, typographical errors or grammatical errors which may not have been identified prior to the finalization of this report.  FINDINGS: Incidental gallstones again noted without secondary signs of acute gallbladder disease. Hepatic steatosis. Spleen pancreas and kidneys are negative. No diverticulitis or colitis. No bowel obstruction. No aneurysm. Bones intact. IMPRESSION: NO ACUTE PATHOLOGY. XR CHEST PORTABLE    Result Date: 4/27/2022  EXAMINATION: XR CHEST PORTABLE CLINICAL HISTORY: RIGHT LINE PLACED COMPARISONS: APRIL 27, 2022, 0935 HOURS, APRIL 20, 2022 FINDINGS: Tip of endotracheal tube 4.2 cm superior to denise. Nasogastric tube courses into stomach. Tip right jugular vein central line at cephalad margin superior vena cava. Tip of left jugular vein central line in left brachiocephalic vein. Median sternotomy. Cardiopericardial silhouette enlarged. Ill-defined area of increased opacity left mid left lower lung. Right lung clear. LEFT MID/LOWER LUNG ATELECTASIS/PNEUMONIA. XR CHEST PORTABLE    Result Date: 4/27/2022  EXAMINATION: Portable AP ERECT view of the chest. CLINICAL HISTORY:  ETT and Central line placement DATE: 4/27/2022 9:37 AM COMPARISONS: 4/20/2022 FINDINGS: Film(s) was obtained with a poor depth of inspiration. The heart is moderately enlarged, unchanged. There are mitral valve annular calcifications, seen previously. There are multiple sternal sutures and mediastinal clips present. The end of endotracheal tube lies 3.5 cm above the denise. The gastric catheter extends into the stomach. There are atherosclerotic calcifications in the aortic arch. This mild degree of Central vascular congestion. Small infiltrate/atelectasis in lung bases, left greater than right. No pleural effusion or pneumothorax. There are mild degenerative changes in spine. MODERATE CARDIAC ENLARGEMENT UNCHANGED. MILD CENTRAL VASCULAR CONGESTION. BIBASILAR INFILTRATES, LEFT GREATER THAN RIGHT.      XR CHEST PORTABLE    Result Date: 4/20/2022  EXAMINATION: XR CHEST PORTABLE CLINICAL HISTORY: CHEST AND ABDOMINAL PAIN FOR 2 DAYS COMPARISONS: CHEST RADIOGRAPH NOVEMBER 16, 2021, CT CHEST NOVEMBER 13, 2021 FINDINGS: Median sternotomy. Cardiopericardial silhouette upper limits normal, unchanged. Ill-defined areas increase opacity lower lung zones bilaterally. BILATERAL LOWER LUNG ATELECTASIS/PNEUMONIA    IR PICC WO SQ PORT/PUMP > 5 YEARS    Result Date: 5/2/2022  EXAMINATION: IR PICC WO SQ PORT/PUMP > 5 YEARS DATE AND TIME:4/29/2022 5:12 PM CLINICAL HISTORY: R10.11 Abdominal pain, right upper quadrant ICD10   access  COMPARISON: None available. Radiation dose: 43.8 mGy. After discussing the procedure and possible complications with the patient, informed consent was obtained. The patient was placed on the Special Procedures table. The right upper extremity was sterilely prepared using 2% chlorhexidine and then draped with sterile towels and a body-sized sterile drape. All personnel in the Special Procedures Room donned caps and surgical masks. In addition, the  and first assistant donned sterile gowns and gloves after proper hand cleansing. A pre-procedure time out was performed in order to assure the correct patient and procedure. Local anesthetic was administered. A peripheral vein was accessed with sonographic guidance. A sonographic spot image was obtained for documentation. A guidewire was advanced into the vein with fluoroscopic guidance and a sheath was placed over the guidewire. A 5-Nepalese dual-lumen PICC was advanced through the sheath, into the brachial, up the arm and into the central vasculature. It was positioned appropriately. The sheath was removed. The catheter was shown to aspirate and infuse properly. The flange of the catheter was affixed to the arm using a PICC securement device. A spot image of the chest showed the tip of the PICC line to lie in the right atrium. The patient tolerated the procedure well and without complications. CONCLUSION: SUCCESSFUL PICC PLACEMENT WITHOUT IMMEDIATE COMPLICATIONS.      US ABDOMEN LIMITED    Result Date: 4/23/2022  EXAM: US ABDOMEN LIMITED HISTORY: Right upper quadrant pain TECHNIQUE: Ultrasound evaluation was performed of the right upper quadrant of the abdomen. COMPARISON: CT abdomen pelvis April 23, 2022 FINDINGS: Normal echogenicity and contour of the liver. No liver lesion or intrahepatic biliary dilatation. Main portal vein is patent. The gallbladder is mildly distended. Layering echogenic material is identified within the lumen of the gallbladder. No pericholecystic fluid. Gallbladder wall thickness is at the upper limits of normal measuring 2.7 mm. Negative Velez sign reported. Common bile duct is normal in diameter measuring 4 mm. No overt abnormality of the pancreas. Nonspecific findings of the gallbladder including mildly distended gallbladder containing layering sludge and/or tiny gallstones with wall thickness of the upper limits of normal measuring 2.7 mm. No pericholecystic fluid to suggest acute cholecystitis. FL CHOLANGIOGRAM OR    Result Date: 4/25/2022  EXAMINATION: FL CHOLANGIOGRAM OR CLINICAL HISTORY:  pain COMPARISONS: None available. FINDINGS: Fluoroscopic assistance was provided during intraoperative cholangiogram. There is contrast opacification of the intrahepatic bile ducts, common hepatic duct, the distal cystic duct, and common bile duct. There is spillage of contrast into the duodenum. There are no persistent filling defects. Number of images: 183 The total radiation time is 12.3 seconds Radiation Dose is 2.71 rad. Fluoroscopic assistance was provided during intraoperative cholangiogram. Please refer to operative report. US DUP LOWER EXTREMITIES BILATERAL VENOUS    Result Date: 4/27/2022  EXAMINATION: US DUP LOWER EXTREMITIES BILATERAL VENOUS CLINICAL HISTORY: Shortness of breath. Bilateral leg pain and swelling.  COMPARISON: None TECHNIQUE: The bilateral lower extremity veins were evaluated with color doppler, gray scale imaging and spectral analysis while using compression and augmentation when possible. RESULT: Evaluation of the bilateral lower extremity veins from the thigh to the knee shows normal phasic flow, normal augmentation of the Doppler signal, and normal compression of the deep veins. There is no sonographic evidence for acute deep venous thrombosis from the bilateral groin to the popliteal region. There is no sonographic evidence for acute deep venous thrombosis in the bilateral segmentally visualized calf veins. No acute DVT of the bilateral lower extremity veins from the groin to the knee. There is no sonographic evidence for acute deep venous thrombosis in the bilateral segmentally visualized calf veins. EKG: TRACING REVIEWED    RECOMMENDATIONS    Risk Management:  routine:  no special precautions necessary    Medications:  Haldol, zoloft restarted  Pt is currently anxious and she will benefit from buspar. Not at imminent risk of suicide which the family confirms  Pt can be discharged when medically stable  F/u with camille on discharge  Discussed with the treating physician/ team about the patient and treatment plan  Reviewed the chart    Discussed with the patient risk, benefit, alternative and common side effects for the  proposed medication treatment. Patient is consenting to the treatment. Thanks for the consult. Please call me if needed.     Electronically signed by Rc Molina MD on 5/2/2022 at 3:40 PM

## 2022-05-02 NOTE — PROGRESS NOTES
Pharmacy Vancomycin Consult     Vancomycin Day: 6  Current Dosing: Vanco 1250mg IV q24h      Recent Labs     05/01/22  0600 05/02/22  0600   BUN 35* 45*   CREATININE 1.40* 1.81*   WBC 15.1* 11.8*       Intake/Output Summary (Last 24 hours) at 5/2/2022 1047  Last data filed at 5/2/2022 0657  Gross per 24 hour   Intake 2372.43 ml   Output 790 ml   Net 1582.43 ml     Cultures  Recent Labs     04/29/22  1920 04/27/22  2059 04/27/22  1030 04/27/22  1030   BC No Growth to date. Any change in status will be called. --    < > Gram stain aerobic bottle  Gram positive cocci in clusters-resembling Staph  2 out of 2 blood cultures  Further testing performed at [address] [place] [address] No Growth to date. Any change in status will be called. --    < > Gram stain aerobic bottle  Gram positive cocci in clusters-resembling Staph  2 out of 2 blood cultures  Further testing performed at [facility]  --   --   --  Staphylococcus epidermidis*  Staphylococcus epidermidis*   LABURIN  --  Cult,Urine:  NO GROWTH  Performed at [facility] [id number] [place] [address], [address] [place] [address]  [phone]    --   --     < > = values in this interval not displayed. Height: 5' 4\" (162.6 cm), Weight: 245 lb (111.1 kg), Body mass index is 42.05 kg/m². Estimated Creatinine Clearance: 38 mL/min (A) (based on SCr of 1.81 mg/dL (H)). .    Trough:  Recent Labs     05/02/22  0600   VANCORANDOM 22.1      Assessment/Plan:  Data input into [employer]Â® platform. Current dosing supra-therapeutic for goal. Will reduce dosing to 750mg IV q24h. Plan follow up level in 24-48 hours to further assess dosing. Timing of future trough levels may be adjusted based on culture results, renal function, and clinical response.     Thank you,  Deliah Merlin, [facility] PharmD

## 2022-05-02 NOTE — PROGRESS NOTES
CARDIOLOGY [address] PROGRESS NOTE         5/2/2022      Marcos Malhotra    [id number]  1955    Rounding MD: Nestor Latham MD ,[facility]    Primary Cardiologist: El Ross MD [address]      SUBJECTIVE:    Looks better overall. CV stable. Has abdominal discomfort. No CP. BCs Positive, ID on case. Family present. Cardiac and general ROS otherwise negative and unchanged. ASSESSMENT:    1. Post recent Cholecystectomy  2. MS Changes / Encephalopathy  3. Shock, Septic probably primary, cardiogenic secondarily  4. CHB, Transient, now resolved. 5. NSTEMI  6. PHTN with RVE , RVSP 60  7. VHD, MR and TR, Moderate  8. CKD  9. Chest pain Hx, reproducible, musculoskeletal, noncardiac  10. CHF, Systolic, acute on chronic  11. CAD post CABG June 2019 [facility] [place] to LAD, PCI to left circumflex and right coronary artery, last April 2020. Post catheterization April 2021, medical treatment advised. 12. Negative Myoview perfusion stress test, 8/2021. 13. Prior Ischemic cardiomyopathy with apical hypokinesia LVEF 35 to 40%. 14. PVOD post right lower extremity revascularization,  and [facility] Dr Joan Padilla, (details not available), Right third toe amputation site infection. 15. Carotid artery disease with ultrasound noted 50-69% bilateral stenoses May 2019. 16. Hypothyroidism  17. Hypertension  18. Hyperlipidemia  19. Diabetes  20. Depression  21. Schizophrenia  22. Family history of coronary artery disease  21. Multiple allergies as noted. PLAN:    1. Supportive Cardiac Care  2. IV Antibiotics. ID care  3. Post Operative Surgical and medical care. 4. IV medications otherwise as tolerated, including IV metoprolol, advance to home PO medications once able. 5. Further recommendations to follow  6.  See orders      OBJECTIVE:     MEDICATIONS:     Scheduled Meds:   dicyclomine  20 mg IntraMUSCular 4x Daily    miconazole   Topical BID    enoxaparin  30 mg SubCUTAneous BID    vancomycin  1,250 mg IntraVENous Q24H    [Held by provider] furosemide  40 mg IntraVENous BID    metoprolol  5 mg IntraVENous 4 times per day    insulin lispro  0-6 Units SubCUTAneous TID WC    insulin lispro  0-3 Units SubCUTAneous Nightly    [Held by provider] potassium chloride  20 mEq Oral BID WC    sodium chloride flush  5-40 mL IntraVENous 2 times per day    meropenem  1,000 mg IntraVENous Q8H    prazosin  1 mg Oral Nightly    fentanNYL  50 mcg IntraVENous Once    rocuronium  1 mg/kg IntraVENous Once    pantoprazole (PROTONIX) 40 mg injection  40 mg IntraVENous Daily    sennosides-docusate sodium  2 tablet Oral BID    vancomycin (VANCOCIN) intermittent dosing (placeholder)   Other RX Placeholder    dextrose bolus (hypoglycemia)  250 mL IntraVENous Once    sodium chloride flush  5-40 mL IntraVENous 2 times per day    sertraline  200 mg Oral Daily    montelukast  10 mg Oral Nightly    [Held by provider] metoprolol succinate  50 mg Oral BID    levothyroxine  50 mcg Oral Daily    [Held by provider] isosorbide dinitrate  20 mg Oral TID    [Held by provider] haloperidol  5 mg Oral Daily    [Held by provider] doxepin  150 mg Oral Nightly    [Held by provider] busPIRone  10 mg Oral BID    [Held by provider] atorvastatin  80 mg Oral Nightly    polyvinyl alcohol  1 drop Both Eyes BID    [Held by provider] ranolazine  1,000 mg Oral BID    [Held by provider] meclizine  25 mg Oral BID    [Held by provider] hydrALAZINE  50 mg Oral 3 times per day    insulin glargine  40 Units SubCUTAneous Nightly     Continuous Infusions:   sodium chloride      sodium chloride 25 mL (04/29/22 1850)    sodium chloride      dextrose       PRN Meds:dextrose bolus (hypoglycemia) **OR** dextrose bolus (hypoglycemia), glucose, morphine, bisacodyl, sodium chloride flush, sodium chloride, atropine, sodium chloride, sodium chloride flush, sodium chloride, ondansetron **OR** ondansetron, polyethylene glycol, acetaminophen **OR** acetaminophen, albuterol, albuterol sulfate HFA, hydrOXYzine, glucagon (rDNA), dextrose    PHYSICAL EXAM:    CURRENT VITALS: /83   Pulse 80   Temp 95 °F (35 °C) (Oral)   Resp 20   Ht 5' 4\" (1.626 m)   Wt 245 lb (111.1 kg)   SpO2 100%   BMI 42.05 kg/m²     CONSTITUTIONAL:  awake, alert, cooperative, no apparent distress,   ENT:  Normocephalic, without obvious abnormality, atraumatic, sinuses nontender on palpation, external ears without lesions,  NECK:  Supple, symmetrical, trachea midline, no adenopathy, thyroid symmetric, not enlarged and no tenderness, skin normal  LUNGS:  No increased work of breathing, good air exchange, clear to auscultation bilaterally, no crackles or wheezing  CARDIOVASCULAR:  Normal apical impulse, regular rate and rhythm, normal S1 and S2,  and no murmur noted  ABDOMEN:  Normal bowel sounds, soft, non-distended, non-tender, no masses palpated, no hepatosplenomegally  EXTREMITIES:  No edema, Pulses strong throughout. NEUROLOGIC:  Awake, alert, oriented to name, place and time.  Following all commands and moving all extremties  SKIN:  no bruising or bleeding, normal skin color, texture, turgor and no rashes     Data:       LABS:  Recent Results (from the past 24 hour(s))   POCT Glucose    Collection Time: 05/01/22 11:49 AM   Result Value Ref Range    POC Glucose 230 (H) 70 - 99 mg/dl    Performed on ACCU-CHEK    POCT Glucose    Collection Time: 05/01/22  3:36 PM   Result Value Ref Range    POC Glucose 200 (H) 70 - 99 mg/dl    Performed on ACCU-CHEK    POCT Glucose    Collection Time: 05/01/22  8:25 PM   Result Value Ref Range    POC Glucose 214 (H) 70 - 99 mg/dl    Performed on ACCU-CHEK    Comprehensive Metabolic Panel w/ Reflex to MG    Collection Time: 05/02/22  6:00 AM   Result Value Ref Range    Sodium 136 135 - 144 mEq/L    Potassium reflex Magnesium 4.8 3.4 - 4.9 mEq/L    Chloride 103 95 - 107 mEq/L    CO2 21 20 - 31 mEq/L    Anion Gap 12 9 - 15 mEq/L Glucose 162 (H) 70 - 99 mg/dL    BUN 45 (H) 8 - 23 mg/dL    CREATININE 1.81 (H) 0.50 - 0.90 mg/dL    GFR Non-African American 28.1 (L) >60    GFR  34.0 (L) >60    Calcium 8.5 8.5 - 9.9 mg/dL    Total Protein 6.4 6.3 - 8.0 g/dL    Albumin 2.9 (L) 3.5 - 4.6 g/dL    Total Bilirubin 0.6 0.2 - 0.7 mg/dL    Alkaline Phosphatase 110 40 - 130 U/L    ALT 17 0 - 33 U/L    AST 39 (H) 0 - 35 U/L    Globulin 3.5 2.3 - 3.5 g/dL   CBC with Auto Differential    Collection Time: 05/02/22  6:00 AM   Result Value Ref Range    WBC 11.8 (H) 4.8 - 10.8 K/uL    RBC 3.27 (L) 4.20 - 5.40 M/uL    Hemoglobin 8.2 (L) 12.0 - 16.0 g/dL    Hematocrit 24.6 (L) 37.0 - 47.0 %    MCV 75.2 (L) 82.0 - 100.0 fL    MCH 25.0 (L) 27.0 - 31.3 pg    MCHC 33.2 33.0 - 37.0 %    RDW 19.2 (H) 11.5 - 14.5 %    Platelets 852 794 - 513 K/uL    Neutrophils % 75.5 %    Lymphocytes % 11.9 %    Monocytes % 10.9 %    Eosinophils % 1.3 %    Basophils % 0.4 %    Neutrophils Absolute 8.9 (H) 1.4 - 6.5 K/uL    Lymphocytes Absolute 1.4 1.0 - 4.8 K/uL    Monocytes Absolute 1.3 (H) 0.2 - 0.8 K/uL    Eosinophils Absolute 0.2 0.0 - 0.7 K/uL    Basophils Absolute 0.0 0.0 - 0.2 K/uL   Renal Function Panel    Collection Time: 05/02/22  6:00 AM   Result Value Ref Range    Potassium 4.8 3.4 - 4.9 mEq/L    Phosphorus 3.5 2.3 - 4.8 mg/dL   Vancomycin Level, Random    Collection Time: 05/02/22  6:00 AM   Result Value Ref Range    Vancomycin Rm 22.1 10.0 - 40.0 ug/mL   POCT Glucose    Collection Time: 05/02/22  6:24 AM   Result Value Ref Range    POC Glucose 188 (H) 70 - 99 mg/dl    Performed on ACCU-CHEK             EKG:    Sinus bradycardia with 1st degree AV block   Nonspecific intraventricular conduction delay   ST & T wave abnormality, consider inferior ischemia   ST & T wave abnormality, consider anterolateral ischemia   Abnormal ECG     ECHO:   Left ventricular ejection fraction is visually estimated at 45-50%.    Near akinesis of septum, hypokinesis of septal aspect of apex and distal    most anteroseptal wall.    Overall LVEF seems a bit better than 8/2021    Right ventricle global systolic function is mildly reduced .    Moderately enlarged right ventricle cavity.    Right ventricular systolic pressure of 47 mm Hg consistent with moderate    pulmonary hypertension.    8/2021 RVSP was 60 mm Hg    Mildly dilated left atrium.    Markedly enlarged right atrium size.    Normal mitral valve structure    3+ MR    Normal aortic valve structure and function.    Normal tricuspid valve structure    Severe tricuspid regurgitation.              Jean-Claude Russell MD ,[facility]

## 2022-05-02 NOTE — CONSULTS
Palliative Care Consult Note  Patient: Rosario Diaz  Gender: female  YOB: 1955  Unit/Bed: X411/Z570-03  Code Status: Full Code  Inpatient Treatment Team: Treatment Team: Attending Provider: Yoanna Rivera DO; Consulting Physician: Aline Mc MD; Surgeon: Aline Mc MD; Consulting Physician: Delfina Villar MD; Consulting Physician: Iris Alas MD; Utilization Reviewer: Elizabeth Krishnan, RN; Consulting Physician: Carmelo Olivas MD; Consulting Physician: Ivett Easley MD; Unit Clerk: Abiola Lira; Utilization Reviewer: Anai Matt, SALMA; Consulting Physician: Adelaide Barr MD; Utilization Reviewer: Viola Vela, RN; Registered Nurse: Yun Mota, RN; Registered Nurse: Salvador Dean, RN; : Joseph Gonzalez, RN; Patient Care Tech: Grokker; Consulting Physician: Navin Bills MD  Admit Date:  4/22/2022    Chief Complaint: Goals of Care    History of Presenting Illness:      Rosario Diaz is a 61 y.o. female on hospital day 9 with a history of diabetes, CAD, hypertension, hyperlipidemia, peripheral arterial disease,schizophrenia, presents with right upper quadrant and epigastric abdominal pain. Admitted for Cholelithiasis, RADHA, HTN, DM. Had lap heather and urinary retention, after surgery developed shock thought to be due to polypharmacy required intubation and ICU transfer. Now back on tele floor. She is complaining of abdominal pain, it is a generalized burning, diffuse, all over, worse with palpation. She has had nausea with some emesis relieved with ondansetron. She fells Protonix helps the pain a little, but morphine does not help the pain at all. She is softly moaning and guarding abdomen. Abd Ct today shows:  1. Fluid collection is seen over the lower lateral aspect of the chest and also abdomen adjacent to the muscle. This could represent abscess. No contrast was administered.  There also is infiltration of the fat anterior to the fluid collection. Review of Systems:       Review of Systems   Constitutional: Positive for fatigue. Negative for activity change, appetite change, chills, diaphoresis, fever and unexpected weight change. HENT: Negative for drooling, hearing loss, mouth sores, sore throat, trouble swallowing and voice change. Eyes: Negative for discharge and visual disturbance. Respiratory: Negative for apnea, cough, choking, chest tightness, shortness of breath, wheezing and stridor. Cardiovascular: Negative for chest pain, palpitations and leg swelling. Gastrointestinal: Positive for abdominal pain and nausea. Negative for abdominal distention, anal bleeding, blood in stool, constipation, diarrhea, rectal pain and vomiting. Genitourinary: Negative for difficulty urinating, dysuria, enuresis, frequency and hematuria. Musculoskeletal: Negative for arthralgias, back pain, gait problem, joint swelling and myalgias. Skin: Negative for color change, pallor, rash and wound. Allergic/Immunologic: Negative for food allergies and immunocompromised state. Neurological: Negative for dizziness, tremors, seizures, syncope, facial asymmetry, speech difficulty, weakness, light-headedness, numbness and headaches. Hematological: Negative for adenopathy. Does not bruise/bleed easily. Psychiatric/Behavioral: Negative for agitation, behavioral problems, confusion, decreased concentration, dysphoric mood, hallucinations, self-injury, sleep disturbance and suicidal ideas. The patient is not nervous/anxious and is not hyperactive. Physical Examination:       /83   Pulse 80   Temp 95 °F (35 °C) (Oral)   Resp 20   Ht 5' 4\" (1.626 m)   Wt 245 lb (111.1 kg)   SpO2 100%   BMI 42.05 kg/m²    Physical Exam    Allergies:       Allergies   Allergen Reactions    Ambien [Zolpidem Tartrate]     Capoten [Captopril]     Clioquinol     Cogentin [Benztropine]     Depakote [Divalproex Sodium]     Effexor Xr [Venlafaxine Hcl Er]     Geodon [Ziprasidone Hcl]     Lisinopril      Hyperkalemia: 4/21/20 potassium was 6.7    Lyrica [Pregabalin]     Navane [Thiothixene]     Pamelor [Nortriptyline Hcl]     Remeron [Mirtazapine]     Risperdal [Risperidone]     Trazodone And Nefazodone     Wellbutrin [Bupropion]        Medications:      Current Facility-Administered Medications   Medication Dose Route Frequency Provider Last Rate Last Admin    dextrose bolus (hypoglycemia) 10% 125 mL  125 mL IntraVENous PRN Chula Lindo MD        Or    dextrose bolus (hypoglycemia) 10% 250 mL  250 mL IntraVENous PRN Chula Lindo MD        glucose chewable tablet 16 g  16 g Oral PRN Chula Lindo MD        dicyclomine (BENTYL) injection 20 mg  20 mg IntraMUSCular 4x Daily Patrick Akhtar MD   20 mg at 05/01/22 2121    miconazole (MICOTIN) 2 % powder   Topical BID Sayda Lowe DO   Given at 05/01/22 2026    morphine (PF) injection 1 mg  1 mg IntraVENous Q4H PRN Ptarick Akhtar MD   1 mg at 05/02/22 0848    bisacodyl (DULCOLAX) suppository 10 mg  10 mg Rectal PRN Patrick Akhtar MD        enoxaparin Sodium (LOVENOX) injection 30 mg  30 mg SubCUTAneous BID Patrick Akhtar MD   30 mg at 05/02/22 0838    vancomycin (VANCOCIN) 1,250 mg in dextrose 5 % 250 mL IVPB  1,250 mg IntraVENous Q24H Amaury David MD   Stopped at 05/01/22 2210    [Held by provider] furosemide (LASIX) injection 40 mg  40 mg IntraVENous BID Fredirick Burkitt, MD   40 mg at 05/01/22 0811    metoprolol (LOPRESSOR) injection 5 mg  5 mg IntraVENous 4 times per day Nilay Townsend MD   5 mg at 05/02/22 0608    insulin lispro (HUMALOG) injection vial 0-6 Units  0-6 Units SubCUTAneous TID ESDRAS Waldron - CNP   2 Units at 05/01/22 1619    insulin lispro (HUMALOG) injection vial 0-3 Units  0-3 Units SubCUTAneous Nightly Zula Pages, APRN - CNP   1 Units at 05/01/22 2026    [Held by provider] potassium chloride (KLOR-CON M) extended release tablet 20 mEq  20 mEq Oral BID  Kimberly Martinez MD   20 mEq at 04/30/22 1659    sodium chloride flush 0.9 % injection 5-40 mL  5-40 mL IntraVENous 2 times per day Stefanie Dee, APRN - CNP   10 mL at 05/02/22 0840    sodium chloride flush 0.9 % injection 5-40 mL  5-40 mL IntraVENous PRN Stefanie Leila, APRN - CNP        0.9 % sodium chloride infusion   IntraVENous PRN Stefanie Leila, APRN - CNP        meropenem (MERREM) 1,000 mg in sodium chloride 0.9 % 100 mL IVPB (mini-bag)  1,000 mg IntraVENous Q8H Allison Hale MD   Stopped at 05/02/22 0538    prazosin (MINIPRESS) capsule 1 mg  1 mg Oral Nightly Alejandro Crane MD   1 mg at 05/01/22 2026    fentaNYL (SUBLIMAZE) injection 50 mcg  50 mcg IntraVENous Once Stefanie Dee, APRN - CNP        rocuronium [facility]) injection 105 mg  1 mg/kg IntraVENous Once Stefanie Dee, APRN - CNP        atropine injection 1 mg  1 mg IntraVENous PRN Stefanie Leila, APRN - CNP        pantoprazole (PROTONIX) 40 mg in sodium chloride (PF) 10 mL injection  40 mg IntraVENous Daily Julio Marshall MD   40 mg at 05/02/22 0839    sennosides-docusate sodium (SENOKOT-S) 8.6-50 MG tablet 2 tablet  2 tablet Oral BID Stefanie Dee, APRN - CNP   2 tablet at 05/02/22 6574    vancomycin (VANCOCIN) intermittent dosing (placeholder)   Other RX Placeholder Julio Marshall MD        dextrose bolus (hypoglycemia) 10% 250 mL  250 mL IntraVENous Once Liz Herrera MD   Held at 04/25/22 1455    0.9 % sodium chloride infusion  25 mL IntraVENous PRN Liz Herrera  mL/hr at 04/29/22 1850 25 mL at 04/29/22 1850    sodium chloride flush 0.9 % injection 5-40 mL  5-40 mL IntraVENous 2 times per day Liz Herrera MD   10 mL at 05/02/22 0841    sodium chloride flush 0.9 % injection 5-40 mL  5-40 mL IntraVENous PRN Liz Herrera MD        0.9 % sodium chloride infusion  25 mL IntraVENous PRN MD Oriana Wolf [place] ondansetron (ZOFRAN-ODT) disintegrating tablet 4 mg  4 mg Oral Q8H PRN Rosibel Mcmanus MD        Or    ondansetron [facility]) injection 4 mg  4 mg IntraVENous Q6H PRN Rosibel Mcmanus MD   4 mg at 05/01/22 0811    polyethylene glycol (GLYCOLAX) packet 17 g  17 g Oral Daily PRN Rosibel Mcmanus MD   17 g at 04/29/22 2053    acetaminophen (TYLENOL) tablet 650 mg  650 mg Oral Q6H PRN Rosibel Mcmanus MD   650 mg at 05/02/22 3868    Or    acetaminophen (TYLENOL) suppository 650 mg  650 mg Rectal Q6H PRN Rosibel Mcmanus MD        albuterol (PROVENTIL) nebulizer solution 2.5 mg  2.5 mg Nebulization Q6H PRN Rosibel Mcmanus MD        albuterol sulfate  (90 Base) MCG/ACT inhaler 2 puff  2 puff Inhalation PRN Rosibel Mcmanus MD        sertraline (ZOLOFT) tablet 200 mg  200 mg Oral Daily Rosibel Mcmanus MD   200 mg at 05/02/22 0838    montelukast (SINGULAIR) tablet 10 mg  10 mg Oral Nightly Rosibel Mcmanus MD   10 mg at 05/01/22 2026    [Held by provider] metoprolol succinate (TOPROL XL) extended release tablet 50 mg  50 mg Oral BID Rosibel Mcmanus MD   50 mg at 04/26/22 2301    levothyroxine (SYNTHROID) tablet 50 mcg  50 mcg Oral Daily Rosibel Mcmanus MD   50 mcg at 05/02/22 1592    [Held by provider] isosorbide dinitrate (ISORDIL) tablet 20 mg  20 mg Oral TID Rosibel Mcmanus MD   20 mg at 04/26/22 2302    hydrOXYzine (VISTARIL) capsule 50 mg  50 mg Oral TID PRN Rosibel Mcmanus MD   50 mg at 05/02/22 0848    [Held by provider] haloperidol (HALDOL) tablet 5 mg  5 mg Oral Daily Rosibel Mcmanus MD   5 mg at 04/26/22 1017    [Held by provider] doxepin (SINEQUAN) capsule 150 mg  150 mg Oral Nightly Rosibel Mcmanus MD   150 mg at 04/26/22 2300    [Held by provider] busPIRone (BUSPAR) tablet 10 mg  10 mg Oral BID Rosibel Mcmanus MD   10 mg at 04/26/22 2302    [Held by provider] atorvastatin (LIPITOR) tablet 80 mg  80 mg Oral Nightly Daniel Dang MD   80 mg at 22 230    polyvinyl alcohol (LIQUIFILM TEARS) 1.4 % ophthalmic solution 1 drop  1 drop Both Eyes BID Daniel Dang MD   1 drop at 22 0840    [Held by provider] ranolazine (RANEXA) extended release tablet 1,000 mg  1,000 mg Oral BID Daniel Dang MD   1,000 mg at 22 2305    [Held by provider] meclizine (ANTIVERT) tablet 25 mg  25 mg Oral BID Daniel Dang MD   25 mg at 22 1628    glucagon (rDNA) injection 1 mg  1 mg IntraMUSCular PRN Daniel Dang MD        dextrose 5 % solution  100 mL/hr IntraVENous PRN Dainel Dang MD       [facility] [place] by provider] hydrALAZINE (APRESOLINE) tablet 50 mg  50 mg Oral 3 times per day Daniel Dang MD   50 mg at 22    insulin glargine (LANTUS) injection vial 40 Units  40 Units SubCUTAneous Nightly Daniel Dang MD   40 Units at 22       History:       PMHx:  Past Medical History:   Diagnosis Date    Asthma     CAD (coronary artery disease)     CKD (chronic kidney disease) stage 3, GFR 30-59 ml/min [facility]     Colitis     Diabetes mellitus ([facility] [address]     Hyperlipidemia     Hypertension     PAD (peripheral artery disease) ([facility])     Prolonged emergence from general anesthesia     PVD (peripheral vascular disease) ([facility])     Thyroid disease        PSHx:  Past Surgical History:   Procedure Laterality Date    CARDIAC SURGERY      CATARACT REMOVAL WITH IMPLANT Bilateral 11- 11-     SECTION      CHOLECYSTECTOMY, LAPAROSCOPIC N/A 2022    LAPAROSCOPIC CHOLECYSTECTOMY performed by Daniel Dang MD at [address] [place] [address] N/A 2019    COLONOSCOPY DIAGNOSTIC performed by Manuela Blevins MD at [address] [place] [address] GRAFT  2019    unknown vessels    HYSTERECTOMY      TOE AMPUTATION Right     3rd toe       Social Hx:  Social History     Socioeconomic History    Marital status:  Spouse name: None    Number of children: None    Years of education: None    Highest education level: None   Occupational History    None   Tobacco Use    Smoking status: Never Smoker    Smokeless tobacco: Never Used   Vaping Use    Vaping Use: Never used   Substance and Sexual Activity    Alcohol use: Never    Drug use: Never    Sexual activity: None   Other Topics Concern    None   Social History Narrative         Lives With: Spouse    Type of Home: [address] [place] [address] [address]: One level    Home Access: Elevator    Bathroom Shower/Tub: Tub/Shower unit(Simultaneous filing. User may not have seen previous data.)    Bathroom Equipment: Grab bars in shower, Shower chair    Home Equipment: Rolling walker, [address] 450 bed    ADL Assistance: Needs assistance    Homemaking Assistance: Needs assistance    Homemaking Responsibilities: No    Ambulation Assistance: Independent    Transfer Assistance: Independent    Active : No    Additional Comments: Pt wears orthopedic shoes at baseline    The patient states she is current with [facility](RN per the ). The patient had a walker, but obtained a hospital bed, wheel chair, BSC and O2 last admission (from [address]). pharmacy is [address] [place] [address],[address] [address]., [facility]. Transportation is provided by [employer]	[place] () per the patient     Social Determinants of Health     Financial Resource Strain:     Difficulty of Paying Living Expenses: Not on file   Food Insecurity:     Worried About [address] in the Last Year: Not on file    Shayy of Food in the Last Year: Not on file   Transportation Needs:     Lack of Transportation (Medical): Not on file    Lack of Transportation (Non-Medical):  Not on file   Physical Activity:     Days of Exercise per Week: Not on file    Minutes of Exercise per Session: Not on file   Stress:     Feeling of Stress : Not on file Social Connections:     Frequency of Communication with Friends and Family: Not on file    Frequency of Social Gatherings with Friends and Family: Not on file    Attends Anglican Services: Not on file    Active Member of Clubs or Organizations: Not on file    Attends Club or Organization Meetings: Not on file    Marital Status: Not on file   Intimate Partner Violence:     Fear of Current or Ex-Partner: Not on file    Emotionally Abused: Not on file    Physically Abused: Not on file    Sexually Abused: Not on file   Housing Stability:     Unable to Pay for Housing in the Last Year: Not on file    Number of [place] in the Last Year: Not on file    Unstable Housing in the Last Year: Not on file       Family Hx:  History reviewed. No pertinent family history.     LABS: Reviewed     CBC:  Lab Results   Component Value Date    WBC 11.8 05/02/2022    RBC 3.27 05/02/2022    HGB 8.2 05/02/2022    HCT 24.6 05/02/2022    MCV 75.2 05/02/2022    MCH 25.0 05/02/2022    MCHC 33.2 05/02/2022    RDW 19.2 05/02/2022     05/02/2022     CBC with Differential:   Lab Results   Component Value Date    WBC 11.8 05/02/2022    RBC 3.27 05/02/2022    HGB 8.2 05/02/2022    HCT 24.6 05/02/2022     05/02/2022    MCV 75.2 05/02/2022    MCH 25.0 05/02/2022    MCHC 33.2 05/02/2022    RDW 19.2 05/02/2022    NRBC 4 04/28/2022    BANDSPCT 2 05/06/2020    LYMPHOPCT 11.9 05/02/2022    MONOPCT 10.9 05/02/2022    BASOPCT 0.4 05/02/2022    MONOSABS 1.3 05/02/2022    LYMPHSABS 1.4 05/02/2022    EOSABS 0.2 05/02/2022    BASOSABS 0.0 05/02/2022     CMP:    Lab Results   Component Value Date     05/02/2022    K 4.8 05/02/2022    K 4.8 05/02/2022     05/02/2022    CO2 21 05/02/2022    BUN 45 05/02/2022    CREATININE 1.81 05/02/2022    GFRAA 34.0 05/02/2022    LABGLOM 28.1 05/02/2022    GLUCOSE 162 05/02/2022    GLUCOSE 279 01/06/2020    PROT 6.4 05/02/2022    LABALBU 2.9 05/02/2022    LABALBU <7.0 01/06/2020 CALCIUM 8.5 05/02/2022    BILITOT 0.6 05/02/2022    ALKPHOS 110 05/02/2022    AST 39 05/02/2022    ALT 17 05/02/2022     BMP:    Lab Results   Component Value Date     05/02/2022    K 4.8 05/02/2022    K 4.8 05/02/2022     05/02/2022    CO2 21 05/02/2022    BUN 45 05/02/2022    LABALBU 2.9 05/02/2022    LABALBU <7.0 01/06/2020    CREATININE 1.81 05/02/2022    CALCIUM 8.5 05/02/2022    GFRAA 34.0 05/02/2022    LABGLOM 28.1 05/02/2022    GLUCOSE 162 05/02/2022    GLUCOSE 279 01/06/2020     TSH:   Lab Results   Component Value Date    TSH 1.470 07/01/2021     Vitamin B12 and Folate: No components found for: FOLIC,  D48  Urinalysis:   Lab Results   Component Value Date    NITRU Negative 04/22/2022    WBCUA 0-2 04/22/2022    BACTERIA Negative 04/22/2022    BLOODU Negative 04/22/2022    SPECGRAV 1.019 04/22/2022    GLUCOSEU Negative 04/22/2022           FUNCTIONAL ADL´S:     Independent: [x  ] Eating, [  x ] Dressing, [ x  ] Transferring, [  x ] Toileting, [ x  ] Bathing, [  x ] Continence  Dependent   : [  ] Eating, [   ] Dressing, [   ] Transferring, [   ] Robi Clonts, [   ] Bathing, [   ] Continence  W. assistant : [  ] Eating, [   ] Dressing, [   ] Transferring, [   ] Robi Clonts, [   ] Ben Perch, [   ] Continence    Radiology: Reviewed      No results found. Assessment and plan:    Abdominal Pain  - Stop Morphine, her GFR is 28.1  - Start Hydromorphone 0.5 mg IV every 4 hours prn moderate to severe pain    Anxiety  - Psych consult for polypharmacy and schizophrenia hx  - better pain control may improve anxiety    -Advance Care Planning  Discussed goals of care with patient and her family. Explained in extensive detail nuances between full code, DNR CCA and DNR CC. Patient has made the decision to be Full Code      -Goals of Care Discussion:  Disease process and goals of treatment were discussed in basic terms.  Goal is to optimize available comfort care measures to decrease Hospitalizations and maximize function. We discussed the palliative care philosophy in light of those goals. We discussed all care options contingent on treatment response and QOL. Much active listening, presence, and emotional support were given. She is not hospice eligible.  May benefit from outpatient pall care due to high symptom burden and high risk for readmission    Electronically signed by ESDRAS Valenzuela CNP on 5/2/2022 at 10:19 AM

## 2022-05-02 NOTE — FLOWSHEET NOTE
Am nursing  assessment completed. Pt :   Resting in bed with eyes closed. Crying and complaining of abdominal pain 10/10            Alert and oriented. Breakfast: held  RESP:  Even and non labored             Lung sounds:      Clear/ DIM                           Oxygen: 2l  Complaints of: pt holding abdomen. Complaints of burning pain 10/10. Abdomen soft. Audible bowel sounds. bm x4 yesterday  Pain: medicated with prn morphine and prn atarax for anxiety  IV: double PICC  TELE: nsr 81  Dressings:  UMESH drain RUQ maintained, noted serosang drainage. Lap heather incisions noted healing, dry and intact. Precautions:              Falls:    70     Joe: 16  Chart and meds reviewed. Noted Labs: Bun 45 cr 1.81  Plan for today: secure message sent to update physician. Will hold breakfast for now incase of testing. Family members at bedside. Call light in reach. NOTES:  1030 returned from CT. Electronically signed by Macy Pro RN on 5/2/2022 at 10:30 AM   56 secure message to physician that Ct is resulted. Electronically signed by Macy Pro RN on 5/2/2022 at 11:07 AM     1130 palliative care RN by for rounds. See new orders. Electronically signed by Macy Pro RN on 5/2/2022 at 12:28 PM    [phone] repositioned. Vitals charted. Prn dilaudid and zofran. Iv atb per order. abd pain remains 10/10. Umesh maintained unchanged. dtr at bedside. Ice chips. Waiting for surgical physician rounds. Electronically signed by Macy Pro RN on 5/2/2022 at 12:23 PM    1232 Dr Leticia Henderson by for rounds. Darlene to remain in for retention. Electronically signed by Macy Pro RN on 5/2/2022 at 12:32 PM     1pm- Dr. Lang Bardales by for rounds.  used to explain that surgery will be coming by to see pt again after reviewing CT scan. 1455 Umesh drain removed. Vitals charted. Surgery reviewed CT scan, no new orders. Family at bedside.  Electronically signed by Kofi Murphy SALMA Orourke on 5/2/2022 at 4:06 PM

## 2022-05-02 NOTE — PROGRESS NOTES
Urology  Patient still being managed with Colon catheter  Patient to be discharged with Colon to be changed monthly  Anna Calle MD

## 2022-05-02 NOTE — PROGRESS NOTES
Infectious Diseases Inpatient Progress Note          HISTORY OF PRESENT ILLNESS:  Follow up acute cholecystitis status postcholecystectomy, coag negative staph bacteremia, possible contamination versus line infection status post CVP removal, new PICC line placed 2 days ago on IV vancomycin and meropenem, well tolerated. Patient reports severe right upper quadrant abdominal pain. Currently transferred to telemetry floor and is more awake and oriented. Had severe constipation but is currently being treated. Had bowel movements. Has poor appetite.   No nausea or vomiting  Repeat blood cultures are negative so far  No fevers  decreasing leukocytosis  Current Medications:     vancomycin  750 mg IntraVENous Q24H    dicyclomine  20 mg IntraMUSCular 4x Daily    miconazole   Topical BID    enoxaparin  30 mg SubCUTAneous BID    [Held by provider] furosemide  40 mg IntraVENous BID    metoprolol  5 mg IntraVENous 4 times per day    insulin lispro  0-6 Units SubCUTAneous TID WC    insulin lispro  0-3 Units SubCUTAneous Nightly    [Held by provider] potassium chloride  20 mEq Oral BID WC    sodium chloride flush  5-40 mL IntraVENous 2 times per day    meropenem  1,000 mg IntraVENous Q8H    prazosin  1 mg Oral Nightly    rocuronium  1 mg/kg IntraVENous Once    pantoprazole (PROTONIX) 40 mg injection  40 mg IntraVENous Daily    sennosides-docusate sodium  2 tablet Oral BID    vancomycin (VANCOCIN) intermittent dosing (placeholder)   Other RX Placeholder    dextrose bolus (hypoglycemia)  250 mL IntraVENous Once    sodium chloride flush  5-40 mL IntraVENous 2 times per day    sertraline  200 mg Oral Daily    montelukast  10 mg Oral Nightly    [Held by provider] metoprolol succinate  50 mg Oral BID    levothyroxine  50 mcg Oral Daily    [Held by provider] isosorbide dinitrate  20 mg Oral TID    [Held by provider] haloperidol  5 mg Oral Daily    [Held by provider] doxepin  150 mg Oral Nightly    [Held by provider] busPIRone  10 mg Oral BID    [Held by provider] atorvastatin  80 mg Oral Nightly    polyvinyl alcohol  1 drop Both Eyes BID    [Held by provider] ranolazine  1,000 mg Oral BID    [Held by provider] meclizine  25 mg Oral BID    [Held by provider] hydrALAZINE  50 mg Oral 3 times per day    insulin glargine  40 Units SubCUTAneous Nightly       Allergies:  Ambien [zolpidem tartrate], Capoten [captopril], Clioquinol, Cogentin [benztropine], Depakote [divalproex sodium], Effexor xr [venlafaxine hcl er], Geodon [ziprasidone hcl], Lisinopril, Lyrica [pregabalin], Navane [thiothixene], Pamelor [nortriptyline hcl], Remeron [mirtazapine], Risperdal [risperidone], Trazodone and nefazodone, and Wellbutrin [bupropion]      Review of Systems  Limited 14 system review is negative other than HPI, severe pain and acute illness    Physical Exam  Vitals:    05/02/22 0505 05/02/22 0604 05/02/22 0830 05/02/22 1455   BP:  127/69 106/83 94/60   Pulse:  84 80 74   Resp:   20 18   Temp:   95 °F (35 °C) 95 °F (35 °C)   TempSrc:   Oral Oral   SpO2:  100% 100% 100%   Weight: 245 lb (111.1 kg)      Height:         General Appearance: alert and oriented , well-developed and well-nourished, in no acute distress, on 2 L nasal cannula  Skin: warm and dry, no rash. Head: normocephalic and atraumatic  Eyes: anicteric sclerae  ENT: oropharynx clear and moist with normal mucous membranes.  No oral thrush  Lungs: normal respiratory effort, clear lungs  Heart normal S1-S2 no murmur  Abdomen: soft, right upper quadrant tenderness and drain with serosanguineous drainage, hyperactive bowel sounds  Intact right upper extremity PICC line  No erythema, no tenderness  Bilateral upper extremity swelling and edema  +1 bilateral leg edema  Small areas of ecchymosis  Follows simple commands appropriately  Colon catheter with clear urine    DATA:    Lab Results   Component Value Date    WBC 11.8 (H) 05/02/2022    HGB 8.2 (L) 05/02/2022    HCT 24.6 (L) 05/02/2022    MCV 75.2 (L) 05/02/2022     05/02/2022     Lab Results   Component Value Date    CREATININE 1.81 (H) 05/02/2022    BUN 45 (H) 05/02/2022     05/02/2022    K 4.8 05/02/2022    K 4.8 05/02/2022     05/02/2022    CO2 21 05/02/2022       Hepatic Function Panel:  Lab Results   Component Value Date    ALKPHOS 110 05/02/2022    ALT 17 05/02/2022    AST 39 05/02/2022    PROT 6.4 05/02/2022    BILITOT 0.6 05/02/2022    BILIDIR <0.2 03/09/2022    IBILI see below 03/09/2022    LABALBU 2.9 05/02/2022    LABALBU <7.0 01/06/2020       Microbiology:   Recent Labs     04/29/22 1920   BC No Growth to date. Any change in status will be called. Recent Labs     04/29/22 1920   BLOODCULT2 No Growth to date. Any change in status will be called. KUB done yesterday     Impression   There are no acute intra-abdominal changes. There is a surgical drain in the right upper quadrant and there are surgical skin staples at several locations in the anterior abdomen     Susceptibility      Staphylococcus epidermidis (2)    Antibiotic Interpretation Microscan  Method Status    benzylpenicillin Resistant >=0.5 mcg/mL BACTERIAL SUSCEPTIBILITY PANEL BY ROBEL     clindamycin Sensitive <=0.25 mcg/mL BACTERIAL SUSCEPTIBILITY PANEL BY ROBEL     erythromycin Resistant >=8 mcg/mL BACTERIAL SUSCEPTIBILITY PANEL BY ROBEL     gentamicin Sensitive <=0.5 mcg/mL BACTERIAL SUSCEPTIBILITY PANEL BY ROBEL      Gentamicin is used only in combination with other active agents that   test susceptible.        inducible clindamycin resistance Sensitive NEGATIVE mcg/mL BACTERIAL SUSCEPTIBILITY PANEL BY ROBEL     oxacillin Resistant >=4 mcg/mL BACTERIAL SUSCEPTIBILITY PANEL BY ROBEL     tetracycline Resistant >=16 mcg/mL BACTERIAL SUSCEPTIBILITY PANEL BY ROBEL     trimethoprim-sulfamethoxazole Sensitive 20 mcg/mL BACTERIAL SUSCEPTIBILITY PANEL BY ROBEL     vancomycin Sensitive 1 mcg/mL BACTERIAL SUSCEPTIBILITY PANEL BY ROBEL            Impression:       1. Fluid collection is seen over the lower lateral aspect of the chest and also abdomen adjacent to the muscle. This could represent abscess. No contrast was administered. There also is infiltration of the fat anterior to the fluid collection. 2. No evidence for appendicitis, diverticulitis or obstruction   3. Patient is status post cholecystectomy and drains are seen in place on the right. All CT scans at this facility use dose modulation, iterative reconstruction, and/or weight based dosing when appropriate to reduce radiation dose to as low as reasonably    achievable. IMPRESSION:    · Septic versus cardiogenic shock   · Coag negative staph (MRSE) bacteremia  · Acute cholecystitis status post laparoscopic cholecystectomy  · ?  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    Surgical wound dehiscence, initial encounter    S/P amputation of lesser toe, right (HCC)    Rectal bleeding    Athscl native arteries of left leg w ulceration oth prt foot ([address]    JESICA (obstructive sleep apnea)    Secondary diabetes mellitus ([address]    Chest pain    Peripheral vascular disease ([address]    Cellulitis    Syncope and collapse    Nonrheumatic mitral (valve) insufficiency    Ischemic myocardial dysfunction    Pulmonary hypertension (HCC)    Acute on chronic combined systolic and diastolic congestive heart failure (HCC)    Asthma    Acute kidney injury superimposed on CKD ([address]    Dizziness    Acute cerebrovascular accident ([address]    Abnormal gait    Anxiety    Gastritis, unspecified, without bleeding    Pure hypercholesterolemia    Schizophrenia (HCC)    Coronary arteriosclerosis    Extrapyramidal disease    Gangrene of toe (HCC)    Drug-induced hypotension    Osteomyelitis of right foot (HCC)    Nausea and vomiting    Mild intermittent asthma without complication    Heart failure, diastolic, with acute decompensation ([facility])    Major depressive disorder, single episode, unspecified    Type 2 diabetes mellitus with diabetic neuropathy, unspecified ([facility])    Obesity (BMI 30-39. 9)    Disorder of carotid artery ([facility])    NSTEMI (non-ST elevated myocardial infarction) ([address]    Pneumonia    CKD (chronic kidney disease) stage 3, GFR 30-59 ml/min ([facility])    Pain in right hand    Anesthesia of skin    Carpal tunnel syndrome    History of angioplasty of peripheral vessel    History of coronary artery bypass surgery    Paresthesia of skin    Acute decompensated heart failure (HCC)    Depression    Abdominal pain    Abdominal pain, right upper quadrant    Shock ([address]    Gram-positive bacteremia    Acute cholecystitis       PLAN:  · F/U with surgery  · Continue IV vancomycin and meropenem for now  · Follow-up repeat blood cultures  · Follow-up CBC BMP  · Recommend Pt/OT    Discussed with patient and adult child    Gladys Arana MD

## 2022-05-02 NOTE — PROGRESS NOTES
INPATIENT PROGRESS NOTES    PATIENT NAME: Rosario Diaz  MRN: [id number]  SERVICE DATE:  May 2, 2022   SERVICE TIME:  5:19 PM      PRIMARY SERVICE: Pulmonary Disease    CHIEF COMPLAIN: Abdominal pain      INTERVAL HPI: Patient seen and examined at bedside, Interval Notes, orders reviewed. Nursing notes noted  Patient is on 3 L O2 via nasal cannula O2 saturation 100%. No fever or chills. No chest pain. No nausea vomiting diarrhea. She is using CPAP during sleep. Family at bedside. OBJECTIVE    Body mass index is 42.05 kg/m². PHYSICAL EXAM:  Vitals:  BP 94/60   Pulse 74   Temp 95 °F (35 °C) (Oral)   Resp 18   Ht 5' 4\" (1.626 m)   Wt 245 lb (111.1 kg)   SpO2 100%   BMI 42.05 kg/m²   General: Alert, awake . comfortable in bed, No distress. Head: Atraumatic , Normocephalic   Eyes: PERRL. No sclera icterus. No conjunctival injection. No discharge   ENT: No nasal  discharge. Pharynx clear. Neck:  Trachea midline. No thyromegaly, no JVD, No cervical adenopathy. Chest : Bilaterally symmetrical ,Normal effort,  No accessory muscle use  Lung : . Fair BS bilateral, decreased BS at bases. No Rales. No wheezing. No rhonchi. Heart[de-identified] Normal  rate. Regular rhythm. No mumur ,  Rub or gallop  ABD: Non-tender. Non-distended. No masses. No organmegaly. Normal bowel sounds. No hernia.   Ext : No Pitting both leg , No Cyanosis No clubbing  Neuro: no focal weakness          DATA:   Recent Labs     05/01/22  0600 05/02/22  0600   WBC 15.1* 11.8*   HGB 8.2* 8.2*   HCT 24.9* 24.6*   MCV 74.4* 75.2*    175     Recent Labs     05/01/22  0600 05/01/22  0600 05/02/22  0600     --  136   K 4.8  4.8   < > 4.8  4.8     --  103   CO2 21  --  21   BUN 35*  --  45*   CREATININE 1.40*  --  1.81*   GLUCOSE 172*  --  162*   CALCIUM 8.4*  --  8.5   PROT 6.4  --  6.4   LABALBU 2.9*  --  2.9*   BILITOT 0.6  --  0.6   ALKPHOS 101  --  110   AST 41*  --  39*   ALT 17   < > 17   LABGLOM 37.8*   < > 28.1*   GFRAA 45.7*   < > 34.0*   GLOB 3.5   < > 3.5    < > = values in this interval not displayed. MV Settings:     Vent Mode: CPAP  Vt (Set, mL): 330 mL  Resp Rate (Set): 32 bmp  FiO2 : 50 %  PEEP/CPAP (cmH2O): 5  Pressure Support: 5 cmH20  Peak Inspiratory Pressure: 11 cmH2O  Mean Airway Pressure: 16 cmH20  I:E Ratio: 1:1.10    No results for input(s): PHART, YJW5MSM, PO2ART, AXZ3EIL, BEART, E5GXSTXD in the last 72 hours. O2 Device: None (Room air)  O2 Flow Rate (L/min): 3 L/min    ADULT DIET;  Dysphagia - Soft and Bite Sized; 4 carb choices (60 gm/meal)  ADULT ORAL NUTRITION SUPPLEMENT; Breakfast, Lunch, Dinner; Diabetic Oral Supplement     MEDICATIONS during current hospitalization:    Continuous Infusions:   sodium chloride      sodium chloride 25 mL (04/29/22 1850)    sodium chloride      dextrose         Scheduled Meds:   vancomycin  750 mg IntraVENous Q24H    dicyclomine  20 mg IntraMUSCular 4x Daily    miconazole   Topical BID    enoxaparin  30 mg SubCUTAneous BID    [Held by provider] furosemide  40 mg IntraVENous BID    metoprolol  5 mg IntraVENous 4 times per day    insulin lispro  0-6 Units SubCUTAneous TID WC    insulin lispro  0-3 Units SubCUTAneous Nightly    [Held by provider] potassium chloride  20 mEq Oral BID WC    sodium chloride flush  5-40 mL IntraVENous 2 times per day    meropenem  1,000 mg IntraVENous Q8H    prazosin  1 mg Oral Nightly    rocuronium  1 mg/kg IntraVENous Once    pantoprazole (PROTONIX) 40 mg injection  40 mg IntraVENous Daily    sennosides-docusate sodium  2 tablet Oral BID    vancomycin (VANCOCIN) intermittent dosing (placeholder)   Other RX Placeholder    dextrose bolus (hypoglycemia)  250 mL IntraVENous Once    sodium chloride flush  5-40 mL IntraVENous 2 times per day    sertraline  200 mg Oral Daily    montelukast  10 mg Oral Nightly    [Held by provider] metoprolol succinate  50 mg Oral BID    levothyroxine  50 mcg Oral Daily    [Held by provider] isosorbide dinitrate  20 mg Oral TID    haloperidol  5 mg Oral Daily    busPIRone  10 mg Oral BID    [Held by provider] atorvastatin  80 mg Oral Nightly    polyvinyl alcohol  1 drop Both Eyes BID    [Held by provider] ranolazine  1,000 mg Oral BID    [Held by provider] meclizine  25 mg Oral BID    [Held by provider] hydrALAZINE  50 mg Oral 3 times per day    insulin glargine  40 Units SubCUTAneous Nightly       PRN Meds:HYDROmorphone, dextrose bolus (hypoglycemia) **OR** dextrose bolus (hypoglycemia), glucose, bisacodyl, sodium chloride flush, sodium chloride, atropine, sodium chloride, sodium chloride flush, sodium chloride, ondansetron **OR** ondansetron, polyethylene glycol, acetaminophen **OR** acetaminophen, albuterol, albuterol sulfate HFA, glucagon (rDNA), dextrose    Radiology  ECHO Complete 2D W Doppler W Color    Result Date: 4/27/2022  Transthoracic Echocardiography Report (TTE)  Demographics   Patient Name   Seema Richardson       Gender               Female                 Johnathan De La Torre   Patient Number [id number]            Race                 Other                                      Ethnicity             or    Visit Number   [id number]           Room Number          IC07   Corporate ID                       Date of Study        04/27/2022   Accession      [id number]          Referring Physician  Number   Date of Birth  1955          Sonographer          Nicolasus Prado   Age            61 year(s)          Interpreting         [address] [facility]                                     Physician            General Hashimoto MD  Procedure Type of Study   TTE procedure:ECHO COMPLETE 2D W/DOP W/COLOR. Procedure Date Date: 04/27/2022 Start: 09:55 AM Study Location: Portable Technical Quality: Adequate visualization Indications:Congestive heart failure.  Patient Status: Routine Height: 64 inches Weight: 242 pounds BSA: 2.12 m^2 BMI: 41.54 kg/m^2 BP: 104/46 mmHg  Conclusions   Summary  Left ventricular ejection fraction is visually estimated at 45-50%. Near akinesis of septum, hypokinesis of septal aspect of apex and distal  most anteroseptal wall. Overall LVEF seems a bit better than 8/2021  Right ventricle global systolic function is mildly reduced . Moderately enlarged right ventricle cavity. Right ventricular systolic pressure of 47 mm Hg consistent with moderate  pulmonary hypertension. 8/2021 RVSP was 60 mm Hg  Mildly dilated left atrium. Markedly enlarged right atrium size. Normal mitral valve structure  3+ MR  Normal aortic valve structure and function. Normal tricuspid valve structure  Severe tricuspid regurgitation. Signature   ----------------------------------------------------------------  Electronically signed by Nas Mcwilliams MD(Interpreting  physician) on 04/27/2022 05:08 PM  ----------------------------------------------------------------   Findings  Left Ventricle Left ventricular ejection fraction is visually estimated at 45-50%. Near akinesis of septum, hypokinesis of septal aspect of apex and distal most anteroseptal wall. Overall LVEF seems a bit better than 8/2021 Right Ventricle Right ventricle global systolic function is mildly reduced . Moderately enlarged right ventricle cavity. Right ventricular systolic pressure of 47 mm Hg consistent with moderate pulmonary hypertension. 8/2021 RVSP was 60 mm Hg Left Atrium Mildly dilated left atrium. Right Atrium Markedly enlarged right atrium size. Mitral Valve Normal mitral valve structure 3+ MR Tricuspid Valve Normal tricuspid valve structure Severe tricuspid regurgitation. Aortic Valve Normal aortic valve structure and function. Pulmonic Valve The pulmonic valve was not well visualized . Pericardial Effusion No evidence of pericardial effusion. Pleural Effusion No evidence of pleural effusion. Aorta \ Miscellaneous Aortic root dimension within normal limits.  M-Mode Measurements (cm)   LVIDd: 4.63 cm                         LVIDs: 3.58 cm  IVSd: 1.18 cm                          IVSs: 1.45 cm  LVPWd: 1.42 cm                         AO Root Dimension: 2.54 cm  Rt. Vent. Dimension: 3.32 cm                                         LVOT: 1.98 cm  Doppler Measurements:   TR Velocity:3.04 m/s  TR Gradient:37.03 mmHg                                     Estimated RAP:10 mmHg                                     RVSP:47.03 mmHg  Valves  Tricuspid Valve   Estimated RVSP: 47.03 mmHg              Estimated RAP: 10 mmHg  TR Velocity: 3.04 m/s                   TR Gradient: 37.03 mmHg   Pulmonic Valve            Estimated PASP: 47.03 mmHg   LVOT   LVOT Diameter: 1.98 cm  Structures  Left Ventricle   Diastolic Dimension: 0.50 cm         Systolic Dimension: 3.00 cm  Septum Diastolic: 4.95 cm            Septum Systolic: 5.77 cm  PW Diastolic: 7.08 cm                                       FS: 22.7 %  LV EDV/LV EDV Index: 98.85 ml/47 m^2 LV ESV/LV ESV Index: 53.69 ml/25 m^2  EF Calculated: 45.7 %   LVOT Diameter: 1.98 cm   Right Atrium   RA Systolic Pressure: 10 mmHg   Right Ventricle   Diastolic Dimension: 2.40 cm                                    RV Systolic Pressure: 67.44 mmHg  Aorta/ Miscellaneous Aorta   Aortic Root: 2.54 cm  LVOT Diameter: 1.98 cm      CT ABDOMEN PELVIS WO CONTRAST Additional Contrast? Radiologist Recommendation    Result Date: 5/2/2022  COMPARISON: April 20, 2022; April 23, 2022 HISTORY:  Severe abdominal pain s/p Lap Teri 4/25 COMMENTS: R10.11 Abdominal pain, right upper quadrant ICD10 TECHNIQUE: procedure Thin slice spiral CT was performed from the lung bases through the iliac crests without contrast administration. Contrast enhancement was not employed due to clinician's order. Sagittal and coronal reconstructions were also performed. FINDINGS: Images through the lung bases demonstrate groundglass opacities and/or atelectasis.  In the subcutaneous fat over the lateral aspect of the lower chest and abdomen there is infiltration and also a fluid collection is seen adjacent to the muscle that measures 12.4 x 4.9 x 15.27 cm. The gallbladder is surgically absent and a drain is seen in the gallbladder fossa exiting the anterior abdomen on the right. Non-contrast images of the liver shows that it appears to be decreased in attenuation with no intrahepatic ductal dilation seen. The pancreas, spleen and adrenals are unremarkable. No hydro-nephrosis, renal calculi or ijeoma-nephric fluid seen in the kidneys. Mild perinephric stranding is seen about both kidneys. No aneurysm  is visualized. Diffuse atherosclerotic calcifications are visualized. No  dilated loops of bowel, free air or free fluid is seen there is mild stranding seen lateral to the descending colon. The visualized portion of the appendix is unremarkable. . An umbilical hernia is seen that contains fat. A Colon catheter is seen in the urinary bladder. No destructive bony lesions are seen. There is evidence of median sternotomy. 1. Fluid collection is seen over the lower lateral aspect of the chest and also abdomen adjacent to the muscle. This could represent abscess. No contrast was administered. There also is infiltration of the fat anterior to the fluid collection. 2. No evidence for appendicitis, diverticulitis or obstruction 3. Patient is status post cholecystectomy and drains are seen in place on the right. All CT scans at this facility use dose modulation, iterative reconstruction, and/or weight based dosing when appropriate to reduce radiation dose to as low as reasonably  achievable. CT ABDOMEN PELVIS WO CONTRAST Additional Contrast? None    Result Date: 4/23/2022  EXAM:  CT ABDOMEN PELVIS WO CONTRAST History: Abdominal pain Technique: Multiple contiguous axial images were obtained of the abdomen and pelvis from the level of the lung bases through the ischial tuberosities without contrast. Multiplanar reformats were obtained.  Comparison: CT abdomen pelvis April 20, 2022 Findings: Lung bases are clear. Heart size is enlarged. Mitral annular calcification. Evidence of prior thoracic surgery. Lack of intravenous contrast precludes optimal evaluation of the abdominal and pelvic viscera. The unenhanced liver, spleen, stomach, pancreas, and adrenal glands appear within normal limits. The gallbladder is mildly distended but not significant changed from recent CT. There are debris is present within the gallbladder lumen. No gallbladder wall thickening or pericholecystic fluid identified by CT. Mild bilateral perinephric stranding. No urinary tract calculi or hydronephrosis. The urinary bladder is decompressed. The uterus is absent. Abdominal aorta is nonaneurysmal  . No retroperitoneal or abdominal/pelvic lymphadenopathy. No small bowel obstruction. No overt colonic mass or pericolonic inflammation. No findings of acute appendicitis No free fluid, loculated fluid collection, or pneumoperitoneum. No acute osseous abnormality. Mildly distended gallbladder containing gallbladder sludge and/or tiny gallstones without significant interval change since April 20, 2022 CT. No CT findings of acute cholecystitis. Mild bilateral perinephric stranding is nonspecific. Correlation for polynephritis is recommended. All CT scans at this facility use dose modulation, iterative reconstruction, and/or weight based dosing when appropriate to reduce radiation dose to as low as reasonably achievable. XR CHEST (2 VW)    Result Date: 4/28/2022  EXAMINATION: Chest x-ray, 2 view HISTORY: Acute respiratory failure TECHNIQUE: Frontal and lateral views of the chest COMPARISON: Portable chest radiograph April 27, 2022 FINDINGS: Interval removal of the endotracheal tube and enteric tube. Right and left jugular catheters remain. Heart size is enlarged. Interval development of right midlung and right lung base opacities. No significant interval change of left lung opacities. No pneumothorax. No acute osseous abnormality. Interval development of right lung infiltrate and/or atelectasis. Otherwise no significant interval change. XR ABDOMEN (KUB) (SINGLE AP VIEW)    Result Date: 4/30/2022  EXAMINATION: XR ABDOMEN (KUB) (SINGLE AP VIEW) CLINICAL HISTORY:  abd pain COMPARISONS:  NONE AVAILABLE TECHNIQUE:  Anterior x-ray views of the abdomen and pelvis. FINDINGS: There is a surgical drain in the right upper quadrant. There is a Colon catheter in place. There are surgical skin staples in the midline and in the left upper and left lower quadrants. The bowel gas pattern is without evidence of obstruction or distended bowel. There is retained fecal material throughout the colon consistent with constipation. No abnormal calcifications. The soft tissues are within normal limits. No acute osseous findings. There are no acute intra-abdominal changes. There is a surgical drain in the right upper quadrant and there are surgical skin staples at several locations in the anterior abdomen. CT HEAD WO CONTRAST    Result Date: 4/27/2022  CT Brain. Contrast medium:  without contrast.. History:  Nonresponsive. Technical factors: CT imaging of the brain was obtained and formatted as 5 mm contiguous axial images. 2.5 mm contiguous axial images were obtained through the osseous structures. Sagittal and coronal reconstruction obtained during postprocessing. Comparison:  CT brain May 6, 2020, April 21, 2020. MRI brain May 7, 2020. Findings: Extra-axial spaces:  Normal. Intracranial hemorrhage:  None. Ventricular system: [Negative. Basal Cisterns:  Without anomaly. Cerebral Parenchyma: Without anomaly. Midline Shift:  None. Cerebellum:  No anomaly identified. Paranasal sinuses and mastoid air cells:  Small air-fluid levels, bilateral maxillary sinuses, left sphenoid sinus. Partial opacification ethmoid sinuses. Visualized Orbits:  Negative. Impression: Pansinusitis.  All CT scans at this facility use dose modulation, iterative reconstruction, and/or weight based dosing when appropriate to reduce radiation dose to as low as reasonably achievable. CTA CHEST W WO CONTRAST    Result Date: 4/30/2022  EXAM:CTA CHEST W WO CONTRAST DATE4/30/2022 8:51 AM: REASON FOR EXAM:Acute severe anterior chest wall pain. r/o PE COMPARISON: none Technique: Helical CT was performed through the chest following the IV administration of100  cc of nonionic contrast. 3-D MIP reconstructions were performed on an independent workstation in the coronal plane with thick slab technique utilized in the interpretation. 3-D maximum intensity projection performed. All CT scans at this facility use dose modulation, iterative reconstruction, and/or weight based dosing when appropriate to reduce radiation dose to as low as reasonably achievable. CHEST CTA  FINDINGS: Mediastinum: There are mild hilar and mediastinal lymph nodes, likely related to reactive nodes. The chest wall and lower neck are normal Heart: The heart is enlarged, cardiomegaly. There is no pericardial effusion. Vascular structures: There is no evidence for thoracic aortic aneurysm or dissection. No evidence of traumatic aortic injury. There are no persistent filling defects within the pulmonary arteries. Lungs: There are patchy groundglass alveolar alveolar opacities in both lungs predominantly in the lung bases which are nonspecific for alveolitis, pneumonitis. Pleura: No pleural effusion or thickening. Upper abdomen: The visualized portions of the upper abdomen are unremarkable. Bones/axillae/soft tissues: Osseous structures and chest wall are normal.     Negative CTA of the chest. No evidence of thoracic aortic dissection or aneurysm. The study is negative for pulmonary emboli. There are nonspecific groundglass opacities in both lungs which may secondary to inflammation, infection. There are shotty mediastinal lymphadenopathy.      NM LUNG SCAN PERFUSION ONLY    Result Date: 4/28/2022  EXAMINATION: PERFUSION ONLY SCINTIGRAPHY CLINICAL HISTORY: 61-year-old with new onset acidosis, worsening renal failure and cardiac disease. Patient has been intubated. TECHNIQUE: 5.1 mCi of technetium labeled MAA were utilized for pulmonary perfusion only scintigraphy. Planar images of the chest were obtained in the anterior, posterior, lateral, and oblique planes. Comparison made with a AP chest x-ray from 4/28/2022 which demonstrates bilateral pulmonary opacities in both mid and lower lung zones. FINDINGS: Perfusion images are abnormal. There are perfusion defects involving the lingula and basilar segments of the left lower lobe. There are also perfusion defects involving much of the right lower lobe. No ventilation images to assess for a matching defects. ABNORMAL PERFUSION SCINTIGRAPHY WITH BILATERAL PERFUSION DEFECTS. THEREFORE, THE STUDY IS INDETERMINATE FOR ASSESSMENT OF ACUTE PULMONARY EMBOLUS    CT ABDOMEN PELVIS W IV CONTRAST Additional Contrast? None    Result Date: 4/20/2022  EXAMINATION: CT ABDOMEN PELVIS W IV CONTRAST DATE AND TIME:4/20/2022 7:19 AM CLINICAL HISTORY: Acute abdominal pain. Epigastric pain. abd and chest pain x 2 days  COMPARISON: August 15, 2021 TECHNIQUE: Contiguous axial CT sections of the abdomen and pelvis. 100 cc's of IV contrast given. .  All CT scans at this facility use dose modulation, iterative reconstruction, and/or weight based dosing when appropriate to reduce radiation dose to as low as reasonably achievable. Some of this report was completed using  [id number] and may include unintended errors with respect to translation of words, typographical errors or grammatical errors which may not have been identified prior to the finalization of this report. FINDINGS: Incidental gallstones again noted without secondary signs of acute gallbladder disease. Hepatic steatosis. Spleen pancreas and kidneys are negative. No diverticulitis or colitis. No bowel obstruction.  No aneurysm. Bones intact. IMPRESSION: NO ACUTE PATHOLOGY. XR CHEST PORTABLE    Result Date: 4/27/2022  EXAMINATION: XR CHEST PORTABLE CLINICAL HISTORY: RIGHT LINE PLACED COMPARISONS: APRIL 27, 2022, 0935 HOURS, APRIL 20, 2022 FINDINGS: Tip of endotracheal tube 4.2 cm superior to denise. Nasogastric tube courses into stomach. Tip right jugular vein central line at cephalad margin superior vena cava. Tip of left jugular vein central line in left brachiocephalic vein. Median sternotomy. Cardiopericardial silhouette enlarged. Ill-defined area of increased opacity left mid left lower lung. Right lung clear. LEFT MID/LOWER LUNG ATELECTASIS/PNEUMONIA. XR CHEST PORTABLE    Result Date: 4/27/2022  EXAMINATION: Portable AP ERECT view of the chest. CLINICAL HISTORY:  ETT and Central line placement DATE: 4/27/2022 9:37 AM COMPARISONS: 4/20/2022 FINDINGS: Film(s) was obtained with a poor depth of inspiration. The heart is moderately enlarged, unchanged. There are mitral valve annular calcifications, seen previously. There are multiple sternal sutures and mediastinal clips present. The end of endotracheal tube lies 3.5 cm above the denise. The gastric catheter extends into the stomach. There are atherosclerotic calcifications in the aortic arch. This mild degree of Central vascular congestion. Small infiltrate/atelectasis in lung bases, left greater than right. No pleural effusion or pneumothorax. There are mild degenerative changes in spine. MODERATE CARDIAC ENLARGEMENT UNCHANGED. MILD CENTRAL VASCULAR CONGESTION. BIBASILAR INFILTRATES, LEFT GREATER THAN RIGHT. XR CHEST PORTABLE    Result Date: 4/20/2022  EXAMINATION: XR CHEST PORTABLE CLINICAL HISTORY: CHEST AND ABDOMINAL PAIN FOR 2 DAYS COMPARISONS: CHEST RADIOGRAPH NOVEMBER 16, 2021, CT CHEST NOVEMBER 13, 2021 FINDINGS: Median sternotomy. Cardiopericardial silhouette upper limits normal, unchanged.  Ill-defined areas increase opacity lower lung zones bilaterally. BILATERAL LOWER LUNG ATELECTASIS/PNEUMONIA    IR PICC WO SQ PORT/PUMP > 5 YEARS    Result Date: 5/2/2022  EXAMINATION: IR PICC WO SQ PORT/PUMP > 5 YEARS DATE AND TIME:4/29/2022 5:12 PM CLINICAL HISTORY: R10.11 Abdominal pain, right upper quadrant ICD10   access  COMPARISON: None available. Radiation dose: 43.8 mGy. After discussing the procedure and possible complications with the patient, informed consent was obtained. The patient was placed on the Special Procedures table. The right upper extremity was sterilely prepared using 2% chlorhexidine and then draped with sterile towels and a body-sized sterile drape. All personnel in the Special Procedures Room donned caps and surgical masks. In addition, the  and first assistant donned sterile gowns and gloves after proper hand cleansing. A pre-procedure time out was performed in order to assure the correct patient and procedure. Local anesthetic was administered. A peripheral vein was accessed with sonographic guidance. A sonographic spot image was obtained for documentation. A guidewire was advanced into the vein with fluoroscopic guidance and a sheath was placed over the guidewire. A 5-Latvian dual-lumen PICC was advanced through the sheath, into the brachial, up the arm and into the central vasculature. It was positioned appropriately. The sheath was removed. The catheter was shown to aspirate and infuse properly. The flange of the catheter was affixed to the arm using a PICC securement device. A spot image of the chest showed the tip of the PICC line to lie in the right atrium. The patient tolerated the procedure well and without complications. CONCLUSION: SUCCESSFUL PICC PLACEMENT WITHOUT IMMEDIATE COMPLICATIONS. US ABDOMEN LIMITED    Result Date: 4/23/2022  EXAM: US ABDOMEN LIMITED HISTORY: Right upper quadrant pain TECHNIQUE: Ultrasound evaluation was performed of the right upper quadrant of the abdomen. COMPARISON: CT abdomen pelvis April 23, 2022 FINDINGS: Normal echogenicity and contour of the liver. No liver lesion or intrahepatic biliary dilatation. Main portal vein is patent. The gallbladder is mildly distended. Layering echogenic material is identified within the lumen of the gallbladder. No pericholecystic fluid. Gallbladder wall thickness is at the upper limits of normal measuring 2.7 mm. Negative Velez sign reported. Common bile duct is normal in diameter measuring 4 mm. No overt abnormality of the pancreas. Nonspecific findings of the gallbladder including mildly distended gallbladder containing layering sludge and/or tiny gallstones with wall thickness of the upper limits of normal measuring 2.7 mm. No pericholecystic fluid to suggest acute cholecystitis. FL CHOLANGIOGRAM OR    Result Date: 4/25/2022  EXAMINATION: FL CHOLANGIOGRAM OR CLINICAL HISTORY:  pain COMPARISONS: None available. FINDINGS: Fluoroscopic assistance was provided during intraoperative cholangiogram. There is contrast opacification of the intrahepatic bile ducts, common hepatic duct, the distal cystic duct, and common bile duct. There is spillage of contrast into the duodenum. There are no persistent filling defects. Number of images: 183 The total radiation time is 12.3 seconds Radiation Dose is 2.71 rad. Fluoroscopic assistance was provided during intraoperative cholangiogram. Please refer to operative report. US DUP LOWER EXTREMITIES BILATERAL VENOUS    Result Date: 4/27/2022  EXAMINATION: US DUP LOWER EXTREMITIES BILATERAL VENOUS CLINICAL HISTORY: Shortness of breath. Bilateral leg pain and swelling. COMPARISON: None TECHNIQUE: The bilateral lower extremity veins were evaluated with color doppler, gray scale imaging and spectral analysis while using compression and augmentation when possible.   RESULT: Evaluation of the bilateral lower extremity veins from the thigh to the knee shows normal phasic flow, normal augmentation of the Doppler signal, and normal compression of the deep veins. There is no sonographic evidence for acute deep venous thrombosis from the bilateral groin to the popliteal region. There is no sonographic evidence for acute deep venous thrombosis in the bilateral segmentally visualized calf veins. No acute DVT of the bilateral lower extremity veins from the groin to the knee. There is no sonographic evidence for acute deep venous thrombosis in the bilateral segmentally visualized calf veins. IMPRESSION AND SUGGESTION:  1. Sepsis  2. Coag negative staph (MRSE) bacteremia/possible line infection versus contaminated blood cultures  3. Acute cholecystitis status post laparoscopic cholecystectomy  4. Acute on chronic kidney failure  5. Obstructive sleep apnea on CPAP at home    Continue O2 to keep saturation 90% or above. Continue CPAP therapy. Antibiotic as per ID. Family at bedside. NOTE: This report was transcribed using voice recognition software. Every effort was made to ensure accuracy; however, inadvertent computerized transcription errors may be present.       Electronically signed by Kathleen Cabrera MD, FCCP on 5/2/2022 at 5:19 PM

## 2022-05-02 NOTE — CARE COORDINATION
[place] spoke with [facility] DEMETRIA RASCON and confirmed pre cert would be started today.

## 2022-05-02 NOTE — PROGRESS NOTES
Call regarding a CT scan of the abdomen which was done recently regarding persistent abdominal pain despite cholecystectomy. Patient has had a drain in place during her postoperative course which involved an ICU stay    She has a drain in place which is being removed today. She still has persistent abdominal pain. I reviewed her CAT scan. She had a bilateral tap block for postoperative pain control. She has an intramuscular hematoma which does not appear to be an abscess. There is also findings on the right side which demonstrate the right sided tap block she had as well. There are no intraoperative or gallbladder bed problems. There is no evidence of obstruction or other abdominal pathology. I discussed the CT scan and patient's clinical course with Dr. Miguelito aWlton. There are no surgical issues present. I believe this intramuscular hematoma will resolve and do not believe that its resolution will necessarily improve her abdominal pain which continues. Please call if any questions. No surgical intervention anticipated.

## 2022-05-03 NOTE — PROGRESS NOTES
CARDIOLOGY [facility] PROGRESS NOTE         5/3/2022      Caye Dandy    [id number]  1955    Rounding MD: Avila Montero MD ,[facility]    Primary Cardiologist: Emilio Dowd MD [facility]      SUBJECTIVE:    Looks better overall. CV stable. Has abdominal discomfort. No CP. BCs Positive, ID on case. Renal function declining. Primary to address. Family present. Cardiac and general ROS otherwise negative and unchanged. ASSESSMENT:    1. Post recent Cholecystectomy  2. MS Changes / Encephalopathy  3. Shock, Septic probably primary, cardiogenic secondarily  4. CHB, Transient, now resolved. 5. NSTEMI  6. PHTN with RVE , RVSP 60  7. VHD, MR and TR, Moderate  8. CKD  9. Chest pain Hx, reproducible, musculoskeletal, noncardiac  10. CHF, Systolic, acute on chronic  11. CAD post CABG June 2019 [facility], [place] to [place], PCI to left circumflex and right coronary artery, last April 2020. Post catheterization April 2021, medical treatment advised. 12. Negative Myoview perfusion stress test, 8/2021. 13. Prior Ischemic cardiomyopathy with apical hypokinesia LVEF 35 to 40%. 14. PVOD post right lower extremity revascularization,  and [facility] Dr Britney Waldrop, (details not available), Right third toe amputation site infection. 15. Carotid artery disease with ultrasound noted 50-69% bilateral stenoses May 2019. 16. Hypothyroidism  17. Hypertension  18. Hyperlipidemia  19. Diabetes  20. Renal Insufficiency  21. Depression  22. Schizophrenia  23. Family history of coronary artery disease  24. Multiple allergies as noted. PLAN:    1. Supportive Cardiac Care  2. IV Antibiotics. ID care  3. Post Operative Surgical and medical care. 4. IV medications otherwise as tolerated, including IV metoprolol, advance to home PO medications once able. 5. Primary to address renal function, IVFs etc.  6. Further recommendations to follow  7.  See orders      OBJECTIVE:     MEDICATIONS:     Scheduled Meds:   albumin human  25 g IntraVENous Once    vancomycin  750 mg IntraVENous Q24H    dicyclomine  20 mg IntraMUSCular 4x Daily    miconazole   Topical BID    enoxaparin  30 mg SubCUTAneous BID    [Held by provider] furosemide  40 mg IntraVENous BID    metoprolol  5 mg IntraVENous 4 times per day    insulin lispro  0-6 Units SubCUTAneous TID WC    insulin lispro  0-3 Units SubCUTAneous Nightly    [Held by provider] potassium chloride  20 mEq Oral BID WC    sodium chloride flush  5-40 mL IntraVENous 2 times per day    meropenem  1,000 mg IntraVENous Q8H    prazosin  1 mg Oral Nightly    rocuronium  1 mg/kg IntraVENous Once    pantoprazole (PROTONIX) 40 mg injection  40 mg IntraVENous Daily    sennosides-docusate sodium  2 tablet Oral BID    vancomycin (VANCOCIN) intermittent dosing (placeholder)   Other RX Placeholder    dextrose bolus (hypoglycemia)  250 mL IntraVENous Once    sodium chloride flush  5-40 mL IntraVENous 2 times per day    sertraline  200 mg Oral Daily    montelukast  10 mg Oral Nightly    [Held by provider] metoprolol succinate  50 mg Oral BID    levothyroxine  50 mcg Oral Daily    [Held by provider] isosorbide dinitrate  20 mg Oral TID    haloperidol  5 mg Oral Daily    busPIRone  10 mg Oral BID    [Held by provider] atorvastatin  80 mg Oral Nightly    polyvinyl alcohol  1 drop Both Eyes BID    [Held by provider] ranolazine  1,000 mg Oral BID    [Held by provider] meclizine  25 mg Oral BID    [Held by provider] hydrALAZINE  50 mg Oral 3 times per day    insulin glargine  40 Units SubCUTAneous Nightly     Continuous Infusions:   sodium chloride      sodium chloride      sodium chloride 25 mL (04/29/22 1850)    sodium chloride      dextrose       PRN Meds:HYDROmorphone, dextrose bolus (hypoglycemia) **OR** dextrose bolus (hypoglycemia), glucose, bisacodyl, sodium chloride flush, sodium chloride, atropine, sodium chloride, sodium chloride flush, sodium chloride, ondansetron **OR** ondansetron, polyethylene glycol, acetaminophen **OR** acetaminophen, albuterol, albuterol sulfate HFA, glucagon (rDNA), dextrose    PHYSICAL EXAM:    CURRENT VITALS: /80   Pulse 76   Temp 97.5 °F (36.4 °C) (Oral)   Resp 17   Ht 5' 4\" (1.626 m)   Wt 245 lb (111.1 kg)   SpO2 98%   BMI 42.05 kg/m²     CONSTITUTIONAL:  awake, alert, cooperative, no apparent distress,   ENT:  Normocephalic, without obvious abnormality, atraumatic, sinuses nontender on palpation, external ears without lesions,  NECK:  Supple, symmetrical, trachea midline, no adenopathy, thyroid symmetric, not enlarged and no tenderness, skin normal  LUNGS:  No increased work of breathing, good air exchange, clear to auscultation bilaterally, no crackles or wheezing  CARDIOVASCULAR:  Normal apical impulse, regular rate and rhythm, normal S1 and S2,  and no murmur noted  ABDOMEN:  Normal bowel sounds, soft, non-distended, non-tender, no masses palpated, no hepatosplenomegally  EXTREMITIES:  No edema, Pulses strong throughout. NEUROLOGIC:  Awake, alert, oriented to name, place and time.  Following all commands and moving all extremties  SKIN:  no bruising or bleeding, normal skin color, texture, turgor and no rashes     Data:       LABS:  Recent Results (from the past 24 hour(s))   POCT Glucose    Collection Time: 05/02/22 10:44 AM   Result Value Ref Range    POC Glucose 167 (H) 70 - 99 mg/dl    Performed on ACCU-CHEK    POCT Glucose    Collection Time: 05/02/22  4:00 PM   Result Value Ref Range    POC Glucose 148 (H) 70 - 99 mg/dl    Performed on ACCU-CHEK    Lactic Acid    Collection Time: 05/02/22  4:58 PM   Result Value Ref Range    Lactic Acid 1.0 0.5 - 2.2 mmol/L   POCT Glucose    Collection Time: 05/02/22  9:06 PM   Result Value Ref Range    POC Glucose 197 (H) 70 - 99 mg/dl    Performed on ACCU-CHEK    Comprehensive Metabolic Panel w/ Reflex to MG    Collection Time: 05/03/22  6:00 AM   Result Value Ref Range Sodium 133 (L) 135 - 144 mEq/L    Potassium reflex Magnesium 4.6 3.4 - 4.9 mEq/L    Chloride 101 95 - 107 mEq/L    CO2 20 20 - 31 mEq/L    Anion Gap 12 9 - 15 mEq/L    Glucose 124 (H) 70 - 99 mg/dL    BUN 50 (H) 8 - 23 mg/dL    CREATININE 1.90 (H) 0.50 - 0.90 mg/dL    GFR Non-African American 26.6 (L) >60    GFR  32.2 (L) >60    Calcium 8.3 (L) 8.5 - 9.9 mg/dL    Total Protein 6.0 (L) 6.3 - 8.0 g/dL    Albumin 2.6 (L) 3.5 - 4.6 g/dL    Total Bilirubin 0.5 0.2 - 0.7 mg/dL    Alkaline Phosphatase 104 40 - 130 U/L    ALT 15 0 - 33 U/L    AST 32 0 - 35 U/L    Globulin 3.4 2.3 - 3.5 g/dL   CBC with Auto Differential    Collection Time: 05/03/22  6:00 AM   Result Value Ref Range    WBC 11.7 (H) 4.8 - 10.8 K/uL    RBC 3.29 (L) 4.20 - 5.40 M/uL    Hemoglobin 8.2 (L) 12.0 - 16.0 g/dL    Hematocrit 24.7 (L) 37.0 - 47.0 %    MCV 75.0 (L) 82.0 - 100.0 fL    MCH 24.9 (L) 27.0 - 31.3 pg    MCHC 33.2 33.0 - 37.0 %    RDW 19.5 (H) 11.5 - 14.5 %    Platelets 453 603 - 974 K/uL    Neutrophils % 74.5 %    Lymphocytes % 11.5 %    Monocytes % 10.6 %    Eosinophils % 2.8 %    Basophils % 0.6 %    Neutrophils Absolute 8.7 (H) 1.4 - 6.5 K/uL    Lymphocytes Absolute 1.3 1.0 - 4.8 K/uL    Monocytes Absolute 1.2 (H) 0.2 - 0.8 K/uL    Eosinophils Absolute 0.3 0.0 - 0.7 K/uL    Basophils Absolute 0.1 0.0 - 0.2 K/uL   Renal Function Panel    Collection Time: 05/03/22  6:00 AM   Result Value Ref Range    Potassium 4.6 3.4 - 4.9 mEq/L    Phosphorus 3.4 2.3 - 4.8 mg/dL   Lactic Acid    Collection Time: 05/03/22  6:00 AM   Result Value Ref Range    Lactic Acid 1.0 0.5 - 2.2 mmol/L   Magnesium    Collection Time: 05/03/22  6:00 AM   Result Value Ref Range    Magnesium 2.1 1.7 - 2.4 mg/dL   POCT Glucose    Collection Time: 05/03/22  6:08 AM   Result Value Ref Range    POC Glucose 136 (H) 70 - 99 mg/dl    Performed on ACCU-CHEK             EKG:    Sinus bradycardia with 1st degree AV block   Nonspecific intraventricular conduction delay ST & T wave abnormality, consider inferior ischemia   ST & T wave abnormality, consider anterolateral ischemia   Abnormal ECG     ECHO:   Left ventricular ejection fraction is visually estimated at 45-50%.    Near akinesis of septum, hypokinesis of septal aspect of apex and distal    most anteroseptal wall.    Overall LVEF seems a bit better than 8/2021    Right ventricle global systolic function is mildly reduced .    Moderately enlarged right ventricle cavity.    Right ventricular systolic pressure of 47 mm Hg consistent with moderate    pulmonary hypertension.    8/2021 RVSP was 60 mm Hg    Mildly dilated left atrium.    Markedly enlarged right atrium size.    Normal mitral valve structure    3+ MR    Normal aortic valve structure and function.    Normal tricuspid valve structure    Severe tricuspid regurgitation.              Ashely Obrien MD ,[facility]

## 2022-05-03 NOTE — PLAN OF CARE
Nutrition Problem #1: Inadequate oral intake  Intervention: Food and/or Nutrient Delivery: Continue Current Diet,Continue Oral Nutrition Supplement  Nutritional

## 2022-05-03 NOTE — PROGRESS NOTES
INPATIENT PROGRESS NOTES    PATIENT NAME: Fidencio Severance  MRN: [id number]  SERVICE DATE:  May 3, 2022   SERVICE TIME:  4:38 PM      PRIMARY SERVICE: Pulmonary Disease    CHIEF COMPLAIN: Abdominal pain      INTERVAL HPI: Patient seen and examined at bedside, Interval Notes, orders reviewed. Nursing notes noted  Patient is on 3 L O2 via nasal cannula O2 saturation 98% No fever or chills. No chest pain. No nausea, vomiting or diarrhea. She is using CPAP during sleep. Son at bedside. No new problem overnight    OBJECTIVE    Body mass index is 42.05 kg/m². PHYSICAL EXAM:  Vitals:  /69   Pulse 78   Temp 97.8 °F (36.6 °C) (Oral)   Resp 16   Ht 5' 4\" (1.626 m)   Wt 245 lb (111.1 kg)   SpO2 98%   BMI 42.05 kg/m²   General: Alert, awake . comfortable in bed, No distress. Head: Atraumatic , Normocephalic   Eyes: PERRL. No sclera icterus. No conjunctival injection. No discharge   ENT: No nasal  discharge. Pharynx clear. Neck:  Trachea midline. No thyromegaly, no JVD, No cervical adenopathy. Chest : Bilaterally symmetrical ,Normal effort,  No accessory muscle use  Lung : . Fair BS bilateral, decreased BS at bases. No Rales. No wheezing. No rhonchi. Heart[de-identified] Normal  rate. Regular rhythm. No mumur ,  Rub or gallop  ABD: Non-tender. Non-distended. No masses. No organmegaly. Normal bowel sounds. No hernia.   Ext : No Pitting both leg , No Cyanosis No clubbing  Neuro: no focal weakness          DATA:   Recent Labs     05/02/22  0600 05/03/22  0600   WBC 11.8* 11.7*   HGB 8.2* 8.2*   HCT 24.6* 24.7*   MCV 75.2* 75.0*    154     Recent Labs     05/02/22  0600 05/02/22  0600 05/03/22  0600     --  133*   K 4.8  4.8   < > 4.6  4.6     --  101   CO2 21  --  20   BUN 45*  --  50*   CREATININE 1.81*  --  1.90*   GLUCOSE 162*  --  124*   CALCIUM 8.5  --  8.3*   PROT 6.4  --  6.0*   LABALBU 2.9*  --  2.6*   BILITOT 0.6  --  0.5   ALKPHOS 110  --  104   AST 39*  --  32   ALT 17   < > 15 LABGLOM 28.1*   < > 26.6*   GFRAA 34.0*   < > 32.2*   GLOB 3.5   < > 3.4    < > = values in this interval not displayed. MV Settings:     Vent Mode: CPAP  Vt (Set, mL): 330 mL  Resp Rate (Set): 32 bmp  FiO2 : 50 %  PEEP/CPAP (cmH2O): 5  Pressure Support: 5 cmH20  Peak Inspiratory Pressure: 11 cmH2O  Mean Airway Pressure: 16 cmH20  I:E Ratio: 1:1.10    No results for input(s): PHART, PDK1GUD, PO2ART, WFO7PVX, BEART, N5EAKWCY in the last 72 hours. O2 Device: Nasal cannula  O2 Flow Rate (L/min): 4 L/min    ADULT DIET;  Dysphagia - Soft and Bite Sized; 4 carb choices (60 gm/meal)  ADULT ORAL NUTRITION SUPPLEMENT; Breakfast, Lunch, Dinner; Diabetic Oral Supplement     MEDICATIONS during current hospitalization:    Continuous Infusions:   sodium chloride 100 mL/hr (05/03/22 1010)    sodium chloride      sodium chloride 25 mL (04/29/22 1850)    sodium chloride      dextrose         Scheduled Meds:   vancomycin  750 mg IntraVENous Q24H    dicyclomine  20 mg IntraMUSCular 4x Daily    miconazole   Topical BID    enoxaparin  30 mg SubCUTAneous BID    [Held by provider] furosemide  40 mg IntraVENous BID    metoprolol  5 mg IntraVENous 4 times per day    insulin lispro  0-6 Units SubCUTAneous TID WC    insulin lispro  0-3 Units SubCUTAneous Nightly    [Held by provider] potassium chloride  20 mEq Oral BID WC    sodium chloride flush  5-40 mL IntraVENous 2 times per day    meropenem  1,000 mg IntraVENous Q8H    prazosin  1 mg Oral Nightly    rocuronium  1 mg/kg IntraVENous Once    pantoprazole (PROTONIX) 40 mg injection  40 mg IntraVENous Daily    sennosides-docusate sodium  2 tablet Oral BID    vancomycin (VANCOCIN) intermittent dosing (placeholder)   Other RX Placeholder    dextrose bolus (hypoglycemia)  250 mL IntraVENous Once    sodium chloride flush  5-40 mL IntraVENous 2 times per day    sertraline  200 mg Oral Daily    montelukast  10 mg Oral Nightly    [Held by provider] metoprolol succinate  50 mg Oral BID    levothyroxine  50 mcg Oral Daily    [Held by provider] isosorbide dinitrate  20 mg Oral TID    haloperidol  5 mg Oral Daily    busPIRone  10 mg Oral BID    [Held by provider] atorvastatin  80 mg Oral Nightly    polyvinyl alcohol  1 drop Both Eyes BID    [Held by provider] ranolazine  1,000 mg Oral BID    [Held by provider] meclizine  25 mg Oral BID    [Held by provider] hydrALAZINE  50 mg Oral 3 times per day    insulin glargine  40 Units SubCUTAneous Nightly       PRN Meds:HYDROmorphone, dextrose bolus (hypoglycemia) **OR** dextrose bolus (hypoglycemia), glucose, bisacodyl, sodium chloride flush, sodium chloride, atropine, sodium chloride, sodium chloride flush, sodium chloride, ondansetron **OR** ondansetron, polyethylene glycol, acetaminophen **OR** acetaminophen, albuterol, albuterol sulfate HFA, glucagon (rDNA), dextrose    Radiology  ECHO Complete 2D W Doppler W Color    Result Date: 4/27/2022  Transthoracic Echocardiography Report (TTE)  Demographics   Patient Name   Orlando Pace       Gender               Female                 Betty Warren   Patient Number [id number]            Race                 Other                                      Ethnicity             or    Visit Number   [id number]           Room Number          IC07   Corporate ID                       Date of Study        04/27/2022   Accession      [id number]          Referring Physician  Number   Date of Birth  1955          Sonographer          Kem Blanco   Age            61 year(s)          Interpreting         [address] [facility]                                     Physician            Tobin Posada MD  Procedure Type of Study   TTE procedure:ECHO COMPLETE 2D W/DOP W/COLOR. Procedure Date Date: 04/27/2022 Start: 09:55 AM Study Location: Portable Technical Quality: Adequate visualization Indications:Congestive heart failure.  Patient Status: Routine Height: 64 inches Weight: 242 pounds BSA: 2.12 m^2 BMI: 41.54 kg/m^2 BP: 104/46 mmHg  Conclusions   Summary  Left ventricular ejection fraction is visually estimated at 45-50%. Near akinesis of septum, hypokinesis of septal aspect of apex and distal  most anteroseptal wall. Overall LVEF seems a bit better than 8/2021  Right ventricle global systolic function is mildly reduced . Moderately enlarged right ventricle cavity. Right ventricular systolic pressure of 47 mm Hg consistent with moderate  pulmonary hypertension. 8/2021 RVSP was 60 mm Hg  Mildly dilated left atrium. Markedly enlarged right atrium size. Normal mitral valve structure  3+ MR  Normal aortic valve structure and function. Normal tricuspid valve structure  Severe tricuspid regurgitation. Signature   ----------------------------------------------------------------  Electronically signed by Jadon Smiley MD(Interpreting  physician) on 04/27/2022 05:08 PM  ----------------------------------------------------------------   Findings  Left Ventricle Left ventricular ejection fraction is visually estimated at 45-50%. Near akinesis of septum, hypokinesis of septal aspect of apex and distal most anteroseptal wall. Overall LVEF seems a bit better than 8/2021 Right Ventricle Right ventricle global systolic function is mildly reduced . Moderately enlarged right ventricle cavity. Right ventricular systolic pressure of 47 mm Hg consistent with moderate pulmonary hypertension. 8/2021 RVSP was 60 mm Hg Left Atrium Mildly dilated left atrium. Right Atrium Markedly enlarged right atrium size. Mitral Valve Normal mitral valve structure 3+ MR Tricuspid Valve Normal tricuspid valve structure Severe tricuspid regurgitation. Aortic Valve Normal aortic valve structure and function. Pulmonic Valve The pulmonic valve was not well visualized . Pericardial Effusion No evidence of pericardial effusion. Pleural Effusion No evidence of pleural effusion. Aorta \ Miscellaneous Aortic root dimension within normal limits. M-Mode Measurements (cm)   LVIDd: 4.63 cm                         LVIDs: 3.58 cm  IVSd: 1.18 cm                          IVSs: 1.45 cm  LVPWd: 1.42 cm                         AO Root Dimension: 2.54 cm  Rt. Vent. Dimension: 3.32 cm                                         LVOT: 1.98 cm  Doppler Measurements:   TR Velocity:3.04 m/s  TR Gradient:37.03 mmHg                                     Estimated RAP:10 mmHg                                     RVSP:47.03 mmHg  Valves  Tricuspid Valve   Estimated RVSP: 47.03 mmHg              Estimated RAP: 10 mmHg  TR Velocity: 3.04 m/s                   TR Gradient: 37.03 mmHg   Pulmonic Valve            Estimated PASP: 47.03 mmHg   LVOT   LVOT Diameter: 1.98 cm  Structures  Left Ventricle   Diastolic Dimension: 6.90 cm         Systolic Dimension: 7.30 cm  Septum Diastolic: 3.06 cm            Septum Systolic: 1.62 cm  PW Diastolic: 7.76 cm                                       FS: 22.7 %  LV EDV/LV EDV Index: 98.85 ml/47 m^2 LV ESV/LV ESV Index: 53.69 ml/25 m^2  EF Calculated: 45.7 %   LVOT Diameter: 1.98 cm   Right Atrium   RA Systolic Pressure: 10 mmHg   Right Ventricle   Diastolic Dimension: 0.24 cm                                    RV Systolic Pressure: 54.40 mmHg  Aorta/ Miscellaneous Aorta   Aortic Root: 2.54 cm  LVOT Diameter: 1.98 cm      CT ABDOMEN PELVIS WO CONTRAST Additional Contrast? Radiologist Recommendation    Result Date: 5/2/2022  COMPARISON: April 20, 2022; April 23, 2022 HISTORY:  Severe abdominal pain s/p Lap Teri 4/25 COMMENTS: R10.11 Abdominal pain, right upper quadrant ICD10 TECHNIQUE: procedure Thin slice spiral CT was performed from the lung bases through the iliac crests without contrast administration. Contrast enhancement was not employed due to clinician's order. Sagittal and coronal reconstructions were also performed. FINDINGS: Images through the lung bases demonstrate groundglass opacities and/or atelectasis.  In the subcutaneous fat over the lateral aspect of the lower chest and abdomen there is infiltration and also a fluid collection is seen adjacent to the muscle that measures 12.4 x 4.9 x 15.27 cm. The gallbladder is surgically absent and a drain is seen in the gallbladder fossa exiting the anterior abdomen on the right. Non-contrast images of the liver shows that it appears to be decreased in attenuation with no intrahepatic ductal dilation seen. The pancreas, spleen and adrenals are unremarkable. No hydro-nephrosis, renal calculi or ijeoma-nephric fluid seen in the kidneys. Mild perinephric stranding is seen about both kidneys. No aneurysm  is visualized. Diffuse atherosclerotic calcifications are visualized. No  dilated loops of bowel, free air or free fluid is seen there is mild stranding seen lateral to the descending colon. The visualized portion of the appendix is unremarkable. . An umbilical hernia is seen that contains fat. A Colon catheter is seen in the urinary bladder. No destructive bony lesions are seen. There is evidence of median sternotomy. 1. Fluid collection is seen over the lower lateral aspect of the chest and also abdomen adjacent to the muscle. This could represent abscess. No contrast was administered. There also is infiltration of the fat anterior to the fluid collection. 2. No evidence for appendicitis, diverticulitis or obstruction 3. Patient is status post cholecystectomy and drains are seen in place on the right. All CT scans at this facility use dose modulation, iterative reconstruction, and/or weight based dosing when appropriate to reduce radiation dose to as low as reasonably  achievable.     CT ABDOMEN PELVIS WO CONTRAST Additional Contrast? None    Result Date: 4/23/2022  EXAM:  CT ABDOMEN PELVIS WO CONTRAST History: Abdominal pain Technique: Multiple contiguous axial images were obtained of the abdomen and pelvis from the level of the lung bases through the ischial tuberosities without contrast. Multiplanar reformats were obtained. Comparison: CT abdomen pelvis April 20, 2022 Findings: Lung bases are clear. Heart size is enlarged. Mitral annular calcification. Evidence of prior thoracic surgery. Lack of intravenous contrast precludes optimal evaluation of the abdominal and pelvic viscera. The unenhanced liver, spleen, stomach, pancreas, and adrenal glands appear within normal limits. The gallbladder is mildly distended but not significant changed from recent CT. There are debris is present within the gallbladder lumen. No gallbladder wall thickening or pericholecystic fluid identified by CT. Mild bilateral perinephric stranding. No urinary tract calculi or hydronephrosis. The urinary bladder is decompressed. The uterus is absent. Abdominal aorta is nonaneurysmal  . No retroperitoneal or abdominal/pelvic lymphadenopathy. No small bowel obstruction. No overt colonic mass or pericolonic inflammation. No findings of acute appendicitis No free fluid, loculated fluid collection, or pneumoperitoneum. No acute osseous abnormality. Mildly distended gallbladder containing gallbladder sludge and/or tiny gallstones without significant interval change since April 20, 2022 CT. No CT findings of acute cholecystitis. Mild bilateral perinephric stranding is nonspecific. Correlation for polynephritis is recommended. All CT scans at this facility use dose modulation, iterative reconstruction, and/or weight based dosing when appropriate to reduce radiation dose to as low as reasonably achievable. XR CHEST (2 VW)    Result Date: 4/28/2022  EXAMINATION: Chest x-ray, 2 view HISTORY: Acute respiratory failure TECHNIQUE: Frontal and lateral views of the chest COMPARISON: Portable chest radiograph April 27, 2022 FINDINGS: Interval removal of the endotracheal tube and enteric tube. Right and left jugular catheters remain. Heart size is enlarged. Interval development of right midlung and right lung base opacities.  No significant interval change of left lung opacities. No pneumothorax. No acute osseous abnormality. Interval development of right lung infiltrate and/or atelectasis. Otherwise no significant interval change. XR ABDOMEN (KUB) (SINGLE AP VIEW)    Result Date: 4/30/2022  EXAMINATION: XR ABDOMEN (KUB) (SINGLE AP VIEW) CLINICAL HISTORY:  abd pain COMPARISONS:  NONE AVAILABLE TECHNIQUE:  Anterior x-ray views of the abdomen and pelvis. FINDINGS: There is a surgical drain in the right upper quadrant. There is a Colon catheter in place. There are surgical skin staples in the midline and in the left upper and left lower quadrants. The bowel gas pattern is without evidence of obstruction or distended bowel. There is retained fecal material throughout the colon consistent with constipation. No abnormal calcifications. The soft tissues are within normal limits. No acute osseous findings. There are no acute intra-abdominal changes. There is a surgical drain in the right upper quadrant and there are surgical skin staples at several locations in the anterior abdomen. CT HEAD WO CONTRAST    Result Date: 4/27/2022  CT Brain. Contrast medium:  without contrast.. History:  Nonresponsive. Technical factors: CT imaging of the brain was obtained and formatted as 5 mm contiguous axial images. 2.5 mm contiguous axial images were obtained through the osseous structures. Sagittal and coronal reconstruction obtained during postprocessing. Comparison:  CT brain May 6, 2020, April 21, 2020. MRI brain May 7, 2020. Findings: Extra-axial spaces:  Normal. Intracranial hemorrhage:  None. Ventricular system: [Negative. Basal Cisterns:  Without anomaly. Cerebral Parenchyma: Without anomaly. Midline Shift:  None. Cerebellum:  No anomaly identified. Paranasal sinuses and mastoid air cells:  Small air-fluid levels, bilateral maxillary sinuses, left sphenoid sinus. Partial opacification ethmoid sinuses. Visualized Orbits:  Negative. Impression: Pansinusitis. All CT scans at this facility use dose modulation, iterative reconstruction, and/or weight based dosing when appropriate to reduce radiation dose to as low as reasonably achievable. CTA CHEST W WO CONTRAST    Result Date: 4/30/2022  EXAM:CTA CHEST W WO CONTRAST DATE4/30/2022 8:51 AM: REASON FOR EXAM:Acute severe anterior chest wall pain. r/o PE COMPARISON: none Technique: Helical CT was performed through the chest following the IV administration of100  cc of nonionic contrast. 3-D MIP reconstructions were performed on an independent workstation in the coronal plane with thick slab technique utilized in the interpretation. 3-D maximum intensity projection performed. All CT scans at this facility use dose modulation, iterative reconstruction, and/or weight based dosing when appropriate to reduce radiation dose to as low as reasonably achievable. CHEST CTA  FINDINGS: Mediastinum: There are mild hilar and mediastinal lymph nodes, likely related to reactive nodes. The chest wall and lower neck are normal Heart: The heart is enlarged, cardiomegaly. There is no pericardial effusion. Vascular structures: There is no evidence for thoracic aortic aneurysm or dissection. No evidence of traumatic aortic injury. There are no persistent filling defects within the pulmonary arteries. Lungs: There are patchy groundglass alveolar alveolar opacities in both lungs predominantly in the lung bases which are nonspecific for alveolitis, pneumonitis. Pleura: No pleural effusion or thickening. Upper abdomen: The visualized portions of the upper abdomen are unremarkable. Bones/axillae/soft tissues: Osseous structures and chest wall are normal.     Negative CTA of the chest. No evidence of thoracic aortic dissection or aneurysm. The study is negative for pulmonary emboli. There are nonspecific groundglass opacities in both lungs which may secondary to inflammation, infection. There are shotty mediastinal lymphadenopathy.      NM LUNG SCAN PERFUSION ONLY    Result Date: 4/28/2022  EXAMINATION: PERFUSION ONLY SCINTIGRAPHY CLINICAL HISTORY: 51-year-old with new onset acidosis, worsening renal failure and cardiac disease. Patient has been intubated. TECHNIQUE: 5.1 mCi of technetium labeled MAA were utilized for pulmonary perfusion only scintigraphy. Planar images of the chest were obtained in the anterior, posterior, lateral, and oblique planes. Comparison made with a AP chest x-ray from 4/28/2022 which demonstrates bilateral pulmonary opacities in both mid and lower lung zones. FINDINGS: Perfusion images are abnormal. There are perfusion defects involving the lingula and basilar segments of the left lower lobe. There are also perfusion defects involving much of the right lower lobe. No ventilation images to assess for a matching defects. ABNORMAL PERFUSION SCINTIGRAPHY WITH BILATERAL PERFUSION DEFECTS. THEREFORE, THE STUDY IS INDETERMINATE FOR ASSESSMENT OF ACUTE PULMONARY EMBOLUS    CT ABDOMEN PELVIS W IV CONTRAST Additional Contrast? None    Result Date: 4/20/2022  EXAMINATION: CT ABDOMEN PELVIS W IV CONTRAST DATE AND TIME:4/20/2022 7:19 AM CLINICAL HISTORY: Acute abdominal pain. Epigastric pain. abd and chest pain x 2 days  COMPARISON: August 15, 2021 TECHNIQUE: Contiguous axial CT sections of the abdomen and pelvis. 100 cc's of IV contrast given. .  All CT scans at this facility use dose modulation, iterative reconstruction, and/or weight based dosing when appropriate to reduce radiation dose to as low as reasonably achievable. Some of this report was completed using  Power Scribe [id number] and may include unintended errors with respect to translation of words, typographical errors or grammatical errors which may not have been identified prior to the finalization of this report. FINDINGS: Incidental gallstones again noted without secondary signs of acute gallbladder disease. Hepatic steatosis.  Spleen pancreas and kidneys are negative. No diverticulitis or colitis. No bowel obstruction. No aneurysm. Bones intact. IMPRESSION: NO ACUTE PATHOLOGY. XR CHEST PORTABLE    Result Date: 4/27/2022  EXAMINATION: XR CHEST PORTABLE CLINICAL HISTORY: RIGHT LINE PLACED COMPARISONS: APRIL 27, 2022, 0935 HOURS, APRIL 20, 2022 FINDINGS: Tip of endotracheal tube 4.2 cm superior to denise. Nasogastric tube courses into stomach. Tip right jugular vein central line at cephalad margin superior vena cava. Tip of left jugular vein central line in left brachiocephalic vein. Median sternotomy. Cardiopericardial silhouette enlarged. Ill-defined area of increased opacity left mid left lower lung. Right lung clear. LEFT MID/LOWER LUNG ATELECTASIS/PNEUMONIA. XR CHEST PORTABLE    Result Date: 4/27/2022  EXAMINATION: Portable AP ERECT view of the chest. CLINICAL HISTORY:  ETT and Central line placement DATE: 4/27/2022 9:37 AM COMPARISONS: 4/20/2022 FINDINGS: Film(s) was obtained with a poor depth of inspiration. The heart is moderately enlarged, unchanged. There are mitral valve annular calcifications, seen previously. There are multiple sternal sutures and mediastinal clips present. The end of endotracheal tube lies 3.5 cm above the denise. The gastric catheter extends into the stomach. There are atherosclerotic calcifications in the aortic arch. This mild degree of Central vascular congestion. Small infiltrate/atelectasis in lung bases, left greater than right. No pleural effusion or pneumothorax. There are mild degenerative changes in spine. MODERATE CARDIAC ENLARGEMENT UNCHANGED. MILD CENTRAL VASCULAR CONGESTION. BIBASILAR INFILTRATES, LEFT GREATER THAN RIGHT. XR CHEST PORTABLE    Result Date: 4/20/2022  EXAMINATION: XR CHEST PORTABLE CLINICAL HISTORY: CHEST AND ABDOMINAL PAIN FOR 2 DAYS COMPARISONS: CHEST RADIOGRAPH NOVEMBER 16, 2021, CT CHEST NOVEMBER 13, 2021 FINDINGS: Median sternotomy.  Cardiopericardial silhouette upper limits normal, unchanged. Ill-defined areas increase opacity lower lung zones bilaterally. BILATERAL LOWER LUNG ATELECTASIS/PNEUMONIA    IR PICC WO SQ PORT/PUMP > 5 YEARS    Result Date: 5/2/2022  EXAMINATION: IR PICC WO SQ PORT/PUMP > 5 YEARS DATE AND TIME:4/29/2022 5:12 PM CLINICAL HISTORY: R10.11 Abdominal pain, right upper quadrant ICD10   access  COMPARISON: None available. Radiation dose: 43.8 mGy. After discussing the procedure and possible complications with the patient, informed consent was obtained. The patient was placed on the Special Procedures table. The right upper extremity was sterilely prepared using 2% chlorhexidine and then draped with sterile towels and a body-sized sterile drape. All personnel in the Special Procedures Room donned caps and surgical masks. In addition, the  and first assistant donned sterile gowns and gloves after proper hand cleansing. A pre-procedure time out was performed in order to assure the correct patient and procedure. Local anesthetic was administered. A peripheral vein was accessed with sonographic guidance. A sonographic spot image was obtained for documentation. A guidewire was advanced into the vein with fluoroscopic guidance and a sheath was placed over the guidewire. A 5-Greenlandic dual-lumen PICC was advanced through the sheath, into the brachial, up the arm and into the central vasculature. It was positioned appropriately. The sheath was removed. The catheter was shown to aspirate and infuse properly. The flange of the catheter was affixed to the arm using a PICC securement device. A spot image of the chest showed the tip of the PICC line to lie in the right atrium. The patient tolerated the procedure well and without complications. CONCLUSION: SUCCESSFUL PICC PLACEMENT WITHOUT IMMEDIATE COMPLICATIONS.      US ABDOMEN LIMITED    Result Date: 4/23/2022  EXAM: US ABDOMEN LIMITED HISTORY: Right upper quadrant pain TECHNIQUE: Ultrasound evaluation was performed of the right upper quadrant of the abdomen. COMPARISON: CT abdomen pelvis April 23, 2022 FINDINGS: Normal echogenicity and contour of the liver. No liver lesion or intrahepatic biliary dilatation. Main portal vein is patent. The gallbladder is mildly distended. Layering echogenic material is identified within the lumen of the gallbladder. No pericholecystic fluid. Gallbladder wall thickness is at the upper limits of normal measuring 2.7 mm. Negative Velez sign reported. Common bile duct is normal in diameter measuring 4 mm. No overt abnormality of the pancreas. Nonspecific findings of the gallbladder including mildly distended gallbladder containing layering sludge and/or tiny gallstones with wall thickness of the upper limits of normal measuring 2.7 mm. No pericholecystic fluid to suggest acute cholecystitis. FL CHOLANGIOGRAM OR    Result Date: 4/25/2022  EXAMINATION: FL CHOLANGIOGRAM OR CLINICAL HISTORY:  pain COMPARISONS: None available. FINDINGS: Fluoroscopic assistance was provided during intraoperative cholangiogram. There is contrast opacification of the intrahepatic bile ducts, common hepatic duct, the distal cystic duct, and common bile duct. There is spillage of contrast into the duodenum. There are no persistent filling defects. Number of images: 183 The total radiation time is 12.3 seconds Radiation Dose is 2.71 rad. Fluoroscopic assistance was provided during intraoperative cholangiogram. Please refer to operative report. US DUP LOWER EXTREMITIES BILATERAL VENOUS    Result Date: 4/27/2022  EXAMINATION: US DUP LOWER EXTREMITIES BILATERAL VENOUS CLINICAL HISTORY: Shortness of breath. Bilateral leg pain and swelling. COMPARISON: None TECHNIQUE: The bilateral lower extremity veins were evaluated with color doppler, gray scale imaging and spectral analysis while using compression and augmentation when possible.   RESULT: Evaluation of the bilateral lower extremity veins from the thigh to the knee shows normal phasic flow, normal augmentation of the Doppler signal, and normal compression of the deep veins. There is no sonographic evidence for acute deep venous thrombosis from the bilateral groin to the popliteal region. There is no sonographic evidence for acute deep venous thrombosis in the bilateral segmentally visualized calf veins. No acute DVT of the bilateral lower extremity veins from the groin to the knee. There is no sonographic evidence for acute deep venous thrombosis in the bilateral segmentally visualized calf veins. IMPRESSION AND SUGGESTION:  1. Sepsis, improving  2. Coag negative staph (MRSE) bacteremia/possible line infection versus contaminated blood cultures  3. Acute cholecystitis status post laparoscopic cholecystectomy  4. Acute on chronic kidney failure  5. Obstructive sleep apnea on CPAP at home      Continue CPAP therapy. Antibiotic as per ID. Continue O2 to keep saturation 90% or above. No new problem overnight continue present treatment plan. Family at bedside. Discussed with son    NOTE: This report was transcribed using voice recognition software. Every effort was made to ensure accuracy; however, inadvertent computerized transcription errors may be present.       Electronically signed by Artem Gupta MD, FCCP on 5/3/2022 at 4:38 PM

## 2022-05-03 NOTE — PROGRESS NOTES
Assessment documented. Vital signs stable. Meds taken well. Pain is well controlled at this time. Abdominal puncture wounds are covered with band-aids. Colon to CD is patent draining QS, clear, yellow urine. Patient is wearing BiPAP at hs. Call light within reach.

## 2022-05-03 NOTE — PROGRESS NOTES
Progress Note  Date:5/3/2022       SSTO:[id number]  Patient Laina Stafford     YOB: 1955     Age:66 y.o. Subjective    Nausea, vomiting, and abdominal pain improving overnight, but not yet fully resolved. Palliative care service made adjustments to pain regimen  to good effect. Abdominal drain removed yesterday by colorectal surgery. No other new/acute complaints at present. Objective         Vitals Last 24 Hours:  TEMPERATURE:  Temp  Av °F (36.1 °C)  Min: 95 °F (35 °C)  Max: 98.4 °F (36.9 °C)  RESPIRATIONS RANGE: Resp  Av.9  Min: 17  Max: 22  PULSE OXIMETRY RANGE: SpO2  Av.3 %  Min: 97 %  Max: 100 %  PULSE RANGE: Pulse  Av.4  Min: 74  Max: 80  BLOOD PRESSURE RANGE: Systolic (32IZZ), HYM:972 , Min:94 , BTU:798   ; Diastolic (49NYW), BBZ:32, Min:59, Max:126    I/O (24Hr): Intake/Output Summary (Last 24 hours) at 5/3/2022 0800  Last data filed at 5/3/2022 4598  Gross per 24 hour   Intake 1050 ml   Output 605 ml   Net 445 ml     Physical Exam:  Constitutional: Obese  female reclining in bed in [facility]. [place] [facility] present at bedside. Head: NCAT  Eyes: PERRLA  Cardiovascular: RRR. No significant murmurs appreciated. Pulmonary: Normal rate and effort respiration on O2 by NC  Abdomen: Soft, obese, nontense abdomen. Nontympanic. Decreased generalized tenderness. Drain removed in interval.  Neurologic: Mildly somnolent. No focal neurologic deficits appreciated.   Psychiatric: Cooperative with exam.      Labs/Imaging/Diagnostics    Labs:  CBC:  Recent Labs     22  0600 22  0600 22  0600   WBC 15.1* 11.8* 11.7*   RBC 3.34* 3.27* 3.29*   HGB 8.2* 8.2* 8.2*   HCT 24.9* 24.6* 24.7*   MCV 74.4* 75.2* 75.0*   RDW 19.9* 19.2* 19.5*    175 154     CHEMISTRIES:  Recent Labs     22  0600 22  0600 22  0600 22  0600     --  136 133*   K 4.8  4.8   < > 4.8  4.8 4.6  4.6     --  103 101   CO2 21  --   20   BUN 35*  --  45* 50*   CREATININE 1.40*  --  1.81* 1.90*   GLUCOSE 172*  --  162* 124*   PHOS 2.9  --  3.5 3.4   MG  --   --   --  2.1    < > = values in this interval not displayed. PT/INR:  No results for input(s): PROTIME, INR in the last 72 hours. APTT:  No results for input(s): APTT in the last 72 hours. LIVER PROFILE:  Recent Labs     05/01/22  0600 05/02/22  0600 05/03/22  0600   AST 41* 39* 32   ALT 17 17 15   BILITOT 0.6 0.6 0.5   ALKPHOS 101 110 104       Imaging Last 24 Hours:  CT ABDOMEN PELVIS WO CONTRAST Additional Contrast? None    Result Date: 4/23/2022  EXAM:  CT ABDOMEN PELVIS WO CONTRAST History: Abdominal pain Technique: Multiple contiguous axial images were obtained of the abdomen and pelvis from the level of the lung bases through the ischial tuberosities without contrast. Multiplanar reformats were obtained. Comparison: CT abdomen pelvis April 20, 2022 Findings: Lung bases are clear. Heart size is enlarged. Mitral annular calcification. Evidence of prior thoracic surgery. Lack of intravenous contrast precludes optimal evaluation of the abdominal and pelvic viscera. The unenhanced liver, spleen, stomach, pancreas, and adrenal glands appear within normal limits. The gallbladder is mildly distended but not significant changed from recent CT. There are debris is present within the gallbladder lumen. No gallbladder wall thickening or pericholecystic fluid identified by CT. Mild bilateral perinephric stranding. No urinary tract calculi or hydronephrosis. The urinary bladder is decompressed. The uterus is absent. Abdominal aorta is nonaneurysmal  . No retroperitoneal or abdominal/pelvic lymphadenopathy. No small bowel obstruction. No overt colonic mass or pericolonic inflammation. No findings of acute appendicitis No free fluid, loculated fluid collection, or pneumoperitoneum. No acute osseous abnormality.      Mildly distended gallbladder containing gallbladder sludge and/or tiny gallstones without significant interval change since April 20, 2022 CT. No CT findings of acute cholecystitis. Mild bilateral perinephric stranding is nonspecific. Correlation for polynephritis is recommended. All CT scans at this facility use dose modulation, iterative reconstruction, and/or weight based dosing when appropriate to reduce radiation dose to as low as reasonably achievable. US ABDOMEN LIMITED    Result Date: 4/23/2022  EXAM: US ABDOMEN LIMITED HISTORY: Right upper quadrant pain TECHNIQUE: Ultrasound evaluation was performed of the right upper quadrant of the abdomen. COMPARISON: CT abdomen pelvis April 23, 2022 FINDINGS: Normal echogenicity and contour of the liver. No liver lesion or intrahepatic biliary dilatation. Main portal vein is patent. The gallbladder is mildly distended. Layering echogenic material is identified within the lumen of the gallbladder. No pericholecystic fluid. Gallbladder wall thickness is at the upper limits of normal measuring 2.7 mm. Negative Velez sign reported. Common bile duct is normal in diameter measuring 4 mm. No overt abnormality of the pancreas. Nonspecific findings of the gallbladder including mildly distended gallbladder containing layering sludge and/or tiny gallstones with wall thickness of the upper limits of normal measuring 2.7 mm. No pericholecystic fluid to suggest acute cholecystitis.      Assessment//Plan           Hospital Problems           Last Modified POA    * (Principal) Abdominal pain 4/23/2022 Yes    Abdominal pain, right upper quadrant 4/25/2022 Yes    Shock ([address] 4/29/2022 Yes    Gram-positive bacteremia 4/29/2022 Yes    Acute cholecystitis 4/29/2022 Yes    Acute kidney injury superimposed on CKD ([address] 4/29/2022 Yes            Assessment & Plan    Assessment:  · Acute cholecystitis, s/p Lap Teri 4/25  · Severe abdominal pain in setting of above, persistent post-operatively  · CKD 3  · Diabetes  · CAD  · RADHA on CKD  · Hyperkalemia  · Shock - resolved  · Gram positive septicemia  · Sepsis    Updates to Plan:  4/24: Tegretol and aspirin for now as per surgery recommendation. For surgery for tomorrow. Sugars better controlled. She has right upper quadrant pain. Possible cholecystectomy tomorrow. 4/25: Patient is going for lap heather today. Anticipate discharge tomorrow. No overnight events no new complaints. Continue current care.hold nephrotoxic agents, cw IVF.  4/26: Acute urinary retention s/p Colon, urology evaluation, renal function is worsening we will get nephrology evaluation, treat hyperkalemia. Spoke with nursing. 4/27: He upon entering room patient is lethargic, unable to arouse. Spoke with the  at bedside. Blood pressure is low, used  to speak with them. Transfer patient to ICU for hypotension/shock Patient was started on Flomax yesterday. We will get psych eval for polypharmacy. Patient mental status and condition is to be changed from yesterday. Spoke with cardiac critical care team about the care plan  CC time was 45 min  4/28: Spoke with the daughter-in-law, patient intubated and is on pressors. Taper down pressors as tolerated. Patient is on heparin drip for possible PE, renal function is improving. Questionable complete heart block. Follow cardiology, continue IV antibiotic for gram-positive septicemia with multiorgan failure,  4/29: Patient was extubated, spoke with the daughter at bedside. Continue current care. Patient is more stable compared to before. Follow-up ID continue with IV antibiotics, family would like SNF placement. PT OT ordered. 4/30: Patient complains of constipation, order lactulose, abdominal pain KUB. CT angio to rule out PE , IF PE is negative. DC heparin drip. Spoke with nursing about the care. 5/1: Stool for occult blood is negative. CT chest is negative for PE. Continue with DVT prophylaxis. Palliative for pain management.   Continue with current care. No overnight events. Episode of nausea and vomiting today. Abdomen soft, nontender, + BS,   5/2: Repeat CT abdomen without contrast (in setting of RADHA) demonstrating focal findings concerning for postoperative abscess versus hematoma of anterior abdominal wall. Colorectal surgery reconsulted, reviewed images, feels most consistent with postoperative intramuscular small hematomas, with no indication for surgical intervention. They further report that patient clinically appears unchanged from prior to cholecystectomy surgery, at which time she endorsed identical severe abdominal pain and nausea of unclear etiology. Palliative care service consulted for pain management, adjusted pain control regimen with some improvement. Patient reportedly chronically taking Percocet 5/2: every 4 hours as needed while at home. Consider GI consult for possible endoscopy if no symptom improvement by next day. 5/3: Drain removed in interval.  Surgery reviewed CAT scan images, felt not actually an abscess, possible intramuscular postoperative anesthetic block changes not requiring any surgical intervention, not acutely dangerous. Palliative care modified pain regimen on 5/2, pain significantly better controlled today. Decreased nausea/vomiting today. Trending improvement overall today.       Yoanna Rivera,

## 2022-05-03 NOTE — PROGRESS NOTES
Palliative Care Consult Note  Patient: Payal Menon  Gender: female  YOB: 1955  Unit/Bed: Y497/O911-64  Code Status: Full Code  Inpatient Treatment Team: Treatment Team: Attending Provider: Mark Jane DO; Consulting Physician: Kyleigh Yo MD; Surgeon: Kyleigh Yo MD; Consulting Physician: Veda Daley MD; Consulting Physician: Maria Teresa Sánchez MD; Utilization Reviewer: Ar Kaur, RN; Consulting Physician: Edwin Ji MD; Consulting Physician: Veronica Weston MD; Unit Clerk: Altagracia Rojas; Utilization Reviewer: Winston Kidd, SALMA; Consulting Physician: Jose Fregoso MD; Utilization Reviewer: Oneil Bee, RN; Consulting Physician: Marlene Flores MD; Registered Nurse: Benito Duncan RN; Patient Care Tech: Misty Slade; : Magdaleno Givens RN  Admit Date:  4/22/2022    Chief Complaint: Goals of Care    History of Presenting Illness:      Payal Menon is a 61 y.o. female on hospital day 10 with a history of diabetes, CAD, hypertension, hyperlipidemia, peripheral arterial disease,schizophrenia, presents with right upper quadrant and epigastric abdominal pain. Admitted for Cholelithiasis, RADHA, HTN, DM. Had lap heather and urinary retention, after surgery developed shock thought to be due to polypharmacy required intubation and ICU transfer. Now back on tele floor. She is complaining of abdominal pain, it is a generalized burning, diffuse, all over, worse with palpation. She has had nausea with some emesis relieved with ondansetron. She fells Protonix helps the pain a little, but morphine does not help the pain at all. She is softly moaning and guarding abdomen. Abd Ct today shows:  1. Fluid collection is seen over the lower lateral aspect of the chest and also abdomen adjacent to the muscle. This could represent abscess. No contrast was administered.  There also is infiltration of the fat anterior to the fluid collection. 5/3/22      Pt is again complaining of sever generalized abdominal pain, she is moaning, but pain does not seem to increase when I palpate abdomen and it is soft. Lab work appears stable. Bentyl was not given today, only one dose of hydromorphone was used yesterday. Colorectal surgery was in and removed drain, they do not feel pain is due to hematoma or surgical issue. Psych restarted her schizophrenic regimen yesterday. Review of Systems:       Review of Systems   Constitutional: Positive for fatigue. Negative for activity change, appetite change, chills, diaphoresis, fever and unexpected weight change. HENT: Negative for drooling, hearing loss, mouth sores, sore throat, trouble swallowing and voice change. Eyes: Negative for discharge and visual disturbance. Respiratory: Negative for apnea, cough, choking, chest tightness, shortness of breath, wheezing and stridor. Cardiovascular: Negative for chest pain, palpitations and leg swelling. Gastrointestinal: Positive for abdominal pain and nausea. Negative for abdominal distention, anal bleeding, blood in stool, constipation, diarrhea, rectal pain and vomiting. Genitourinary: Negative for difficulty urinating, dysuria, enuresis, frequency and hematuria. Musculoskeletal: Negative for arthralgias, back pain, gait problem, joint swelling and myalgias. Skin: Negative for color change, pallor, rash and wound. Allergic/Immunologic: Negative for food allergies and immunocompromised state. Neurological: Negative for dizziness, tremors, seizures, syncope, facial asymmetry, speech difficulty, weakness, light-headedness, numbness and headaches. Hematological: Negative for adenopathy. Does not bruise/bleed easily. Psychiatric/Behavioral: Negative for agitation, behavioral problems, confusion, decreased concentration, dysphoric mood, hallucinations, self-injury, sleep disturbance and suicidal ideas.  The patient is not nervous/anxious and is not hyperactive. Physical Examination:       /80   Pulse 76   Temp 97.5 °F (36.4 °C) (Oral)   Resp 17   Ht 5' 4\" (1.626 m)   Wt 245 lb (111.1 kg)   SpO2 98%   BMI 42.05 kg/m²    Physical Exam    Allergies:       Allergies   Allergen Reactions    Ambien [Zolpidem Tartrate]     Capoten [Captopril]     Clioquinol     Cogentin [Benztropine]     Depakote [Divalproex Sodium]     Effexor Xr [Venlafaxine Hcl Er]     Geodon [Ziprasidone Hcl]     Lisinopril      Hyperkalemia: 4/21/20 potassium was 6.7    Lyrica [Pregabalin]     Navane [Thiothixene]     Pamelor [Nortriptyline Hcl]     Remeron [Mirtazapine]     Risperdal [Risperidone]     Trazodone And Nefazodone     Wellbutrin [Bupropion]        Medications:      Current Facility-Administered Medications   Medication Dose Route Frequency Provider Last Rate Last Admin    0.9 % sodium chloride infusion   IntraVENous Continuous Pili Lugo  mL/hr at 05/03/22 1010 100 mL/hr at 05/03/22 1010    vancomycin (VANCOCIN) 750 mg in dextrose 5 % 250 mL IVPB  750 mg IntraVENous Q24H ESDRAS Lord - CNP   Stopped at 05/02/22 2208    HYDROmorphone (DILAUDID) injection 0.5 mg  0.5 mg IntraVENous Q4H PRN Cristel [facility] APRN - CNP   0.5 mg at 05/02/22 1201    dextrose bolus (hypoglycemia) 10% 125 mL  125 mL IntraVENous PRN Rosibel Mcmanus MD        Or    dextrose bolus (hypoglycemia) 10% 250 mL  250 mL IntraVENous PRN Rosibel Mcmanus MD        glucose chewable tablet 16 g  16 g Oral PRN Rosibel Mcmanus MD        dicyclomine (BENTYL) injection 20 mg  20 mg IntraMUSCular 4x Daily Serenity Gambino MD   20 mg at 05/02/22 2110    miconazole (MICOTIN) 2 % powder   Topical BID Yang Henry Lowe DO   Given at 05/03/22 1723    bisacodyl (DULCOLAX) suppository 10 mg  10 mg Rectal PRN Serenity Gambino MD        enoxaparin Sodium (LOVENOX) injection 30 mg  30 mg SubCUTAneous BID Serenity Gambino MD   30 mg at 05/03/22 0982    [Held by provider] furosemide (LASIX) injection 40 mg  40 mg IntraVENous BID Paramjit Jacobo MD   40 mg at 05/01/22 0811    metoprolol (LOPRESSOR) injection 5 mg  5 mg IntraVENous 4 times per day Won Alvarado MD   5 mg at 05/03/22 3863    insulin lispro (HUMALOG) injection vial 0-6 Units  0-6 Units SubCUTAneous TID  Mary Lat, APRN - CNP   1 Units at 05/02/22 1726    insulin lispro (HUMALOG) injection vial 0-3 Units  0-3 Units SubCUTAneous Nightly Mary Guerrero, APRN - CNP   1 Units at 05/02/22 2115    [Held by provider] potassium chloride (KLOR-CON M) extended release tablet 20 mEq  20 mEq Oral BID  Antoinette Mclean MD   20 mEq at 04/30/22 1659    sodium chloride flush 0.9 % injection 5-40 mL  5-40 mL IntraVENous 2 times per day ESDRAS Roche - CNP   10 mL at 05/03/22 0847    sodium chloride flush 0.9 % injection 5-40 mL  5-40 mL IntraVENous PRN Martha Rubio APRN - CNP        0.9 % sodium chloride infusion   IntraVENous PRN Martha Rubio APRN - CNP        meropenem (MERREM) 1,000 mg in sodium chloride 0.9 % 100 mL IVPB (mini-bag)  1,000 mg IntraVENous Q8H Gladys Arana MD   Stopped at 05/03/22 0422    prazosin (MINIPRESS) capsule 1 mg  1 mg Oral Nightly Bebe Da Silva MD   1 mg at 05/02/22 2109    rocuronium (ZEMURON) injection 105 mg  1 mg/kg IntraVENous Once Martha Rubio APRN - CNP        atropine injection 1 mg  1 mg IntraVENous PRN Martha Rubio APRN - CNP        pantoprazole (PROTONIX) 40 mg in sodium chloride (PF) 10 mL injection  40 mg IntraVENous Daily Kellie Bejarano MD   40 mg at 05/03/22 0842    sennosides-docusate sodium (SENOKOT-S) 8.6-50 MG tablet 2 tablet  2 tablet Oral BID ESDRAS Roche - CNP   2 tablet at 05/03/22 0843    vancomycin (VANCOCIN) intermittent dosing (placeholder)   Other RX Placeholder Kellie Bejarano MD        dextrose bolus (hypoglycemia) 10% 250 mL  250 mL IntraVENous Once Carlos Carias MD   Held at 04/25/22 0275    0.9 % sodium chloride infusion  25 mL IntraVENous PRN Carmita Bashir  mL/hr at 04/29/22 1850 25 mL at 04/29/22 1850    sodium chloride flush 0.9 % injection 5-40 mL  5-40 mL IntraVENous 2 times per day Carmita Bashir MD   10 mL at 05/03/22 0848    sodium chloride flush 0.9 % injection 5-40 mL  5-40 mL IntraVENous PRN Carmita Bashir MD        0.9 % sodium chloride infusion  25 mL IntraVENous PRN Carmita Bashir MD        ondansetron (ZOFRAN-ODT) disintegrating tablet 4 mg  4 mg Oral Q8H PRN Carmita Bashir MD        Or    ondansetron [facility]) injection 4 mg  4 mg IntraVENous Q6H PRN Carmita Bashir MD   4 mg at 05/02/22 1159    polyethylene glycol (GLYCOLAX) packet 17 g  17 g Oral Daily PRN Carmita Bashir MD   17 g at 04/29/22 2053    acetaminophen (TYLENOL) tablet 650 mg  650 mg Oral Q6H PRN Carmita Bashir MD   650 mg at 05/02/22 9785    Or    acetaminophen (TYLENOL) suppository 650 mg  650 mg Rectal Q6H PRN Carmita Bashir MD        albuterol (PROVENTIL) nebulizer solution 2.5 mg  2.5 mg Nebulization Q6H PRN Carmita Bashir MD        albuterol sulfate  (90 Base) MCG/ACT inhaler 2 puff  2 puff Inhalation PRN Carmita Bashir MD        sertraline (ZOLOFT) tablet 200 mg  200 mg Oral Daily Carmita Bashir MD   200 mg at 05/03/22 0844    montelukast (SINGULAIR) tablet 10 mg  10 mg Oral Nightly Carmita Bashir MD   10 mg at 05/02/22 2108    [Held by provider] metoprolol succinate (TOPROL XL) extended release tablet 50 mg  50 mg Oral BID Carmita Bashir MD   50 mg at 04/26/22 2301    levothyroxine (SYNTHROID) tablet 50 mcg  50 mcg Oral Daily Carmita Bashir MD   50 mcg at 05/03/22 1887    [Held by provider] isosorbide dinitrate (ISORDIL) tablet 20 mg  20 mg Oral TID Carmita Bashir MD   20 mg at 04/26/22 8641    haloperidol (HALDOL) tablet 5 mg  5 mg Oral Daily Carmita Bashir MD   5 mg at 05/03/22 0683    busPIRone (BUSPAR) tablet 10 mg  10 mg Oral BID Linnette Boland MD   10 mg at 22 0843    [Held by provider] atorvastatin (LIPITOR) tablet 80 mg  80 mg Oral Nightly Linnette Boland MD   80 mg at 22 2301    polyvinyl alcohol (LIQUIFILM TEARS) 1.4 % ophthalmic solution 1 drop  1 drop Both Eyes BID Linnette Boland MD   1 drop at 22 0847    [Held by provider] ranolazine (RANEXA) extended release tablet 1,000 mg  1,000 mg Oral BID Linnette Boland MD   1,000 mg at 22 2305    [Held by provider] meclizine (ANTIVERT) tablet 25 mg  25 mg Oral BID Linnette Boland MD   25 mg at 22 162    glucagon (rDNA) injection 1 mg  1 mg IntraMUSCular PRN Linnette Boland MD        dextrose 5 % solution  100 mL/hr IntraVENous PRN Linnette Boland MD       [facility] by provider] hydrALAZINE (APRESOLINE) tablet 50 mg  50 mg Oral 3 times per day Linnette Boland MD   50 mg at 22    insulin glargine (LANTUS) injection vial 40 Units  40 Units SubCUTAneous Nightly Linnette Boland MD   40 Units at 22       History:       PMHx:  Past Medical History:   Diagnosis Date    Asthma     CAD (coronary artery disease)     CKD (chronic kidney disease) stage 3, GFR 30-59 ml/min ([facility]     Colitis     Diabetes mellitus ([facility] [address]     Hyperlipidemia     Hypertension     PAD (peripheral artery disease) ([facility])     Prolonged emergence from general anesthesia     PVD (peripheral vascular disease) ([facility])     Thyroid disease        PSHx:  Past Surgical History:   Procedure Laterality Date    CARDIAC SURGERY      CATARACT REMOVAL WITH IMPLANT Bilateral 11- 11-     SECTION      CHOLECYSTECTOMY, LAPAROSCOPIC N/A 2022    LAPAROSCOPIC CHOLECYSTECTOMY performed by Linnette Boland MD at [facility] [address] N/A 2019    COLONOSCOPY DIAGNOSTIC performed by Gloria Jo MD at [facility] 06/2019    unknown vessels    HYSTERECTOMY      TOE AMPUTATION Right     3rd toe       Social Hx:  Social History     Socioeconomic History    Marital status:      Spouse name: None    Number of children: None    Years of education: None    Highest education level: None   Occupational History    None   Tobacco Use    Smoking status: Never Smoker    Smokeless tobacco: Never Used   Vaping Use    Vaping Use: Never used   Substance and Sexual Activity    Alcohol use: Never    Drug use: Never    Sexual activity: None   Other Topics Concern    None   Social History Narrative         Lives With: Spouse    Type of Home: [address] [place] [address] [address]: One level    Home Access: Elevator    Bathroom Shower/Tub: Tub/Shower unit(Simultaneous filing. User may not have seen previous data.)    Bathroom Equipment: Grab bars in shower, Shower chair    Home Equipment: Rolling walker, [address] 450 bed    ADL Assistance: Needs assistance    Homemaking Assistance: Needs assistance    Homemaking Responsibilities: No    Ambulation Assistance: Independent    Transfer Assistance: Independent    Active : No    Additional Comments: Pt wears orthopedic shoes at baseline    The patient states she is current with [facility](RN per the ). The patient had a walker, but obtained a hospital bed, wheel chair, BSC and O2 last admission (from [address] [place] [address]). pharmacy is [address] [place] [address],[address] [address]., [facility]. Transportation is provided by [facility]	Moe Wise () per the patient     Social Determinants of Health     Financial Resource Strain:     Difficulty of Paying Living Expenses: Not on file   Food Insecurity:     Worried About [address] in the Last Year: Not on file    Shayy of Food in the Last Year: Not on file   Transportation Needs:     Lack of Transportation (Medical):  Not on file    Lack of Transportation (Non-Medical): Not on file   Physical Activity:     Days of Exercise per Week: Not on file    Minutes of Exercise per Session: Not on file   Stress:     Feeling of Stress : Not on file   Social Connections:     Frequency of Communication with Friends and Family: Not on file    Frequency of Social Gatherings with Friends and Family: Not on file    Attends [employer] Services: Not on file    Active Member of [address] [place] [address] or Organizations: Not on file    Attends Club or Organization Meetings: Not on file    Marital Status: Not on file   Intimate Partner Violence:     Fear of Current or Ex-Partner: Not on file    Emotionally Abused: Not on file    Physically Abused: Not on file    Sexually Abused: Not on file   Housing Stability:     Unable to Pay for Housing in the Last Year: Not on file    Number of [place] in the Last Year: Not on file    Unstable Housing in the Last Year: Not on file       Family Hx:  History reviewed. No pertinent family history.     LABS: Reviewed     CBC:  Lab Results   Component Value Date    WBC 11.7 05/03/2022    RBC 3.29 05/03/2022    HGB 8.2 05/03/2022    HCT 24.7 05/03/2022    MCV 75.0 05/03/2022    MCH 24.9 05/03/2022    MCHC 33.2 05/03/2022    RDW 19.5 05/03/2022     05/03/2022     CBC with Differential:   Lab Results   Component Value Date    WBC 11.7 05/03/2022    RBC 3.29 05/03/2022    HGB 8.2 05/03/2022    HCT 24.7 05/03/2022     05/03/2022    MCV 75.0 05/03/2022    MCH 24.9 05/03/2022    MCHC 33.2 05/03/2022    RDW 19.5 05/03/2022    NRBC 4 04/28/2022    BANDSPCT 2 05/06/2020    LYMPHOPCT 11.5 05/03/2022    MONOPCT 10.6 05/03/2022    BASOPCT 0.6 05/03/2022    MONOSABS 1.2 05/03/2022    LYMPHSABS 1.3 05/03/2022    EOSABS 0.3 05/03/2022    BASOSABS 0.1 05/03/2022     CMP:    Lab Results   Component Value Date     05/03/2022    K 4.6 05/03/2022    K 4.6 05/03/2022     05/03/2022    CO2 20 05/03/2022    BUN 50 05/03/2022    CREATININE 1.90 05/03/2022 GFRAA 32.2 05/03/2022    LABGLOM 26.6 05/03/2022    GLUCOSE 124 05/03/2022    GLUCOSE 279 01/06/2020    PROT 6.0 05/03/2022    LABALBU 2.6 05/03/2022    LABALBU <7.0 01/06/2020    CALCIUM 8.3 05/03/2022    BILITOT 0.5 05/03/2022    ALKPHOS 104 05/03/2022    AST 32 05/03/2022    ALT 15 05/03/2022     BMP:    Lab Results   Component Value Date     05/03/2022    K 4.6 05/03/2022    K 4.6 05/03/2022     05/03/2022    CO2 20 05/03/2022    BUN 50 05/03/2022    LABALBU 2.6 05/03/2022    LABALBU <7.0 01/06/2020    CREATININE 1.90 05/03/2022    CALCIUM 8.3 05/03/2022    GFRAA 32.2 05/03/2022    LABGLOM 26.6 05/03/2022    GLUCOSE 124 05/03/2022    GLUCOSE 279 01/06/2020     TSH:   Lab Results   Component Value Date    TSH 1.470 07/01/2021     Vitamin B12 and Folate: No components found for: FOLIC,  Y19  Urinalysis:   Lab Results   Component Value Date    NITRU Negative 04/22/2022    WBCUA 0-2 04/22/2022    BACTERIA Negative 04/22/2022    BLOODU Negative 04/22/2022    SPECGRAV 1.019 04/22/2022    GLUCOSEU Negative 04/22/2022           FUNCTIONAL ADL´S:     Independent: [x  ] Eating, [  x ] Dressing, [ x  ] Transferring, [  x ] Toileting, [ x  ] Bathing, [  x ] Continence  Dependent   : [  ] Eating, [   ] Dressing, [   ] Transferring, [   ] Danna Brash, [   ] Bathing, [   ] Continence  W. assistant : [  ] Eating, [   ] Dressing, [   ] Transferring, [   ] Danna Brash, [   ] Audrene Priscilla, [   ] Continence    Radiology: Reviewed      No results found. Assessment and plan:    Abdominal Pain  - Continue Bentyl  - Continue Hydromorphone 0.5 mg IV every 4 hours prn moderate to severe pain  - Consider GI consult    Anxiety  - Psych consult for polypharmacy and schizophrenia hx  - better pain control may improve anxiety    -Advance Care Planning  Discussed goals of care with patient and her family. Explained in extensive detail nuances between full code, DNR CCA and DNR CC.  Patient has made the decision to be Full Code -Goals of Care Discussion:  Disease process and goals of treatment were discussed in basic terms. Goal is to optimize available comfort care measures to decrease Hospitalizations and maximize function. We discussed the palliative care philosophy in light of those goals. We discussed all care options contingent on treatment response and QOL. Much active listening, presence, and emotional support were given. She is not hospice eligible.  May benefit from outpatient pall care due to high symptom burden and high risk for readmission    Electronically signed by ESDRAS Jaeger CNP on 5/3/2022 at 11:41 AM

## 2022-05-03 NOTE — PROGRESS NOTES
Comprehensive Nutrition Assessment    Type and Reason for Visit:  Reassess    Nutrition Recommendations/Plan:   1. Continue with Soft and bite sized diet, carb control 4 with diabetic oral supplement all   meals  2. If unable to tolerate > 50% of trays, recommend begin TPN,as pt > 7 days inadequate oral intake     Malnutrition Assessment:  Malnutrition Status: At risk for malnutrition (Comment) (05/03/22 1327)    Context:  Acute Illness     Findings of the 6 clinical characteristics of malnutrition:  Energy Intake:  50% or less of estimated energy requirements for 5 or more days  Weight Loss:  Unable to assess     Body Fat Loss:  Unable to assess     Muscle Mass Loss:  Unable to assess    Fluid Accumulation:  Mild     Strength:  Not Performed    Nutrition Assessment:    Pt at high risk for malnutrition > 7 days inadequate oral intake, no progress towards nutrition related goals, has had persistent N/V abdominal pain  since extubation, with some improvement today, recommend TPN if unable to tolerate > 50% of trays    Nutrition Related Findings:    Admitted 4/22 wtih cholecytisitis, lap heather 4/25, developed abcess, intubated 4/27-28, BM 5/1, continues with abdominal pain N/V, mildly improved today, IVF = .9NS @ 100 ml/hr via PICC, continues with genralized trace edema,, renal labs increasing , gluc < 200 Wound Type: Skin Tears       Current Nutrition Intake & Therapies:    Average Meal Intake: 1-25%  Average Supplements Intake: 1-25%  ADULT DIET;  Dysphagia - Soft and Bite Sized; 4 carb choices (60 gm/meal)  ADULT ORAL NUTRITION SUPPLEMENT; Breakfast, Lunch, Dinner; Diabetic Oral Supplement    Anthropometric Measures:  Height: 5' 4\" (162.6 cm)  Ideal Body Weight (IBW): 120 lbs (55 kg)    Admission Body Weight: 225 lb (102.1 kg) (stated)  Current Body Weight: 245 lb (111.1 kg) (5/2 * edema present), 193.3 % IWeight Source: Bed Scale  Current BMI (kg/m2): 42  Usual Body Weight: 241 lb (109.3 kg) (8/15/21-office visit)  % Weight Change (Calculated): -3.7  BMI Categories: Obese Class 3 (BMI 40.0 or greater) (UBW BMI ~ 41)    Estimated Daily Nutrient Needs:  Energy Requirements Based On: Kcal/kg  Weight Used for Energy Requirements: Usual  Energy (kcal/day): 3761-8770 kcals @ 12-14 kcal/kg  Weight Used for Protein Requirements: Ideal  Protein (g/day):  g protein @ 1.5-2 g/kg IBW ( monitor renal fxn)  Method Used for Fluid Requirements: 1 ml/kcal  Fluid (ml/day): ~1200 ml or as per MD    Nutrition Diagnosis:   · Inadequate oral intake related to altered GI function as evidenced by intake 0-25%,nausea,vomiting    Nutrition Interventions:   Food and/or Nutrient Delivery: Continue Current Diet,Continue Oral Nutrition Supplement  Nutrition Education/Counseling: Education not indicated     Goals:  Previous Goal Met: No Progress toward Goal(s)  Goals: Meet at least 75% of estimated needs     Nutrition Monitoring and Evaluation:   Behavioral-Environmental Outcomes: None Identified  Food/Nutrient Intake Outcomes: Diet Advancement/Tolerance,Food and Nutrient Intake,Supplement Intake  Physical Signs/Symptoms Outcomes: Weight,GI Status,Biochemical Data,Meal Time Behavior    Discharge Planning:     Too soon to determine     Sharon Nunez RD, LD

## 2022-05-03 NOTE — FLOWSHEET NOTE
1205 Pt c/o abdominal pain after eating lunch. Medicated with PRN dilaudid. Provider notified. Family in room. 1230 Pt reports some relief from discomfort after pain med.

## 2022-05-03 NOTE — PROGRESS NOTES
Pharmacy Vancomycin Consult     Vancomycin Day: 7  Current Dosing: vanco 750mg IV q24h      Recent Labs     05/02/22  0600 05/03/22  0600   BUN 45* 50*   CREATININE 1.81* 1.90*   WBC 11.8* 11.7*       Intake/Output Summary (Last 24 hours) at 5/3/2022 1213  Last data filed at 5/3/2022 9473  Gross per 24 hour   Intake 1050 ml   Output 605 ml   Net 445 ml     Cultures  Recent Labs     04/29/22  1920 04/27/22  2059 04/27/22  1030 04/27/22  1030   BC No Growth to date. Any change in status will be called. --    < > Gram stain aerobic bottle  Gram positive cocci in clusters-resembling Staph  2 out of 2 blood cultures  Further testing performed at [address] [place] [address] No Growth to date. Any change in status will be called. --    < > Gram stain aerobic bottle  Gram positive cocci in clusters-resembling Staph  2 out of 2 blood cultures  Further testing performed at [address] [place] [address]  --   --   --  Staphylococcus epidermidis*  Staphylococcus caprae*  Staphylococcus epidermidis*   LABURIN  --  Cult,Urine:  NO GROWTH  Performed at [facility] [address] [facility], [address] [place] [address]  ([phone]    --   --     < > = values in this interval not displayed. Height: 5' 4\" (162.6 cm), Weight: 245 lb (111.1 kg), Body mass index is 42.05 kg/m². Estimated Creatinine Clearance: 37 mL/min (A) (based on SCr of 1.9 mg/dL (H)). .    Trough:  Recent Labs     05/02/22  0600   VANCORANDOM 22.1      Assessment/Plan:  Data input into [email or web address] platform. Current dosing predicted on high end of therapeutic Random level tomorrow am to further assess dosing and re-evaluate. Timing of future trough levels may be adjusted based on culture results, renal function, and clinical response.     Thank you,  Gregorio Loza, [facility] PharmD

## 2022-05-03 NOTE — PROGRESS NOTES
Infectious Diseases Inpatient Progress Note          HISTORY OF PRESENT ILLNESS:  Follow up acute cholecystitis status postcholecystectomy, coag negative staph bacteremia, possible contamination versus line infection status post CVP removal, new PICC line placed 2 days ago on IV vancomycin and meropenem, well tolerated. Patient reports severe right upper quadrant abdominal pain. Currently transferred to telemetry floor and is more awake and oriented. Had severe constipation but is currently being treated. Had bowel movements. Has poor appetite.   No nausea or vomiting  Repeat blood cultures are negative so far  No fevers  decreasing leukocytosis  Current Medications:     vancomycin  750 mg IntraVENous Q24H    dicyclomine  20 mg IntraMUSCular 4x Daily    miconazole   Topical BID    enoxaparin  30 mg SubCUTAneous BID    [Held by provider] furosemide  40 mg IntraVENous BID    metoprolol  5 mg IntraVENous 4 times per day    insulin lispro  0-6 Units SubCUTAneous TID WC    insulin lispro  0-3 Units SubCUTAneous Nightly    [Held by provider] potassium chloride  20 mEq Oral BID WC    sodium chloride flush  5-40 mL IntraVENous 2 times per day    meropenem  1,000 mg IntraVENous Q8H    prazosin  1 mg Oral Nightly    rocuronium  1 mg/kg IntraVENous Once    pantoprazole (PROTONIX) 40 mg injection  40 mg IntraVENous Daily    sennosides-docusate sodium  2 tablet Oral BID    vancomycin (VANCOCIN) intermittent dosing (placeholder)   Other RX Placeholder    dextrose bolus (hypoglycemia)  250 mL IntraVENous Once    sodium chloride flush  5-40 mL IntraVENous 2 times per day    sertraline  200 mg Oral Daily    montelukast  10 mg Oral Nightly    [Held by provider] metoprolol succinate  50 mg Oral BID    levothyroxine  50 mcg Oral Daily    [Held by provider] isosorbide dinitrate  20 mg Oral TID    haloperidol  5 mg Oral Daily    busPIRone  10 mg Oral BID    [Held by provider] atorvastatin  80 mg Oral Nightly    polyvinyl alcohol  1 drop Both Eyes BID    [Held by provider] ranolazine  1,000 mg Oral BID    [Held by provider] meclizine  25 mg Oral BID    [Held by provider] hydrALAZINE  50 mg Oral 3 times per day    insulin glargine  40 Units SubCUTAneous Nightly       Allergies:  Ambien [zolpidem tartrate], Capoten [captopril], Clioquinol, Cogentin [benztropine], Depakote [divalproex sodium], Effexor xr [venlafaxine hcl er], Geodon [ziprasidone hcl], Lisinopril, Lyrica [pregabalin], Navane [thiothixene], Pamelor [nortriptyline hcl], Remeron [mirtazapine], Risperdal [risperidone], Trazodone and nefazodone, and Wellbutrin [bupropion]      Review of Systems  14 system review is negative other than HPI, decreased abdominal pain after abdominal drain was removed  Mild SOB  Weak all over    Physical Exam  Vitals:    05/03/22 1145 05/03/22 1158 05/03/22 1218 05/03/22 1423   BP: (!) 141/87 132/64  118/69   Pulse: 83 80  78   Resp:    16   Temp:    97.8 °F (36.6 °C)   TempSrc:    Oral   SpO2: 100%  100% 98%   Weight:       Height:         General Appearance: alert and oriented , well-developed and well-nourished, in no acute distress, on 2 L nasal cannula. Resolved anxiety  Skin: warm and dry, no rash. Head: normocephalic and atraumatic  Eyes: anicteric sclerae  ENT: oropharynx clear and moist with normal mucous membranes.  No oral thrush  Lungs: normal respiratory effort, clear lungs  Heart normal S1-S2 no murmur  Abdomen: soft, right upper quadrant mild tenderness S/P drain removal with serosanguineous drainage on dressing, hyperactive bowel sounds  Intact right upper extremity PICC line  No erythema, no tenderness  Bilateral decreasing upper extremity swelling and edema  trace leg edema  Small areas of ecchymosis  Follows simple commands appropriately  Colon catheter with clear urine    DATA:    Lab Results   Component Value Date    WBC 11.7 (H) 05/03/2022    HGB 8.2 (L) 05/03/2022    HCT 24.7 (L) 05/03/2022    MCV 75.0 (L) 05/03/2022     05/03/2022     Lab Results   Component Value Date    CREATININE 1.90 (H) 05/03/2022    BUN 50 (H) 05/03/2022     (L) 05/03/2022    K 4.6 05/03/2022    K 4.6 05/03/2022     05/03/2022    CO2 20 05/03/2022       Hepatic Function Panel:  Lab Results   Component Value Date    ALKPHOS 104 05/03/2022    ALT 15 05/03/2022    AST 32 05/03/2022    PROT 6.0 05/03/2022    BILITOT 0.5 05/03/2022    BILIDIR <0.2 03/09/2022    IBILI see below 03/09/2022    LABALBU 2.6 05/03/2022    LABALBU <7.0 01/06/2020       Microbiology:   No results for input(s): BC in the last 72 hours. No results for input(s): Deisy Plan in the last 72 hours. KUB done yesterday     Impression   There are no acute intra-abdominal changes. There is a surgical drain in the right upper quadrant and there are surgical skin staples at several locations in the anterior abdomen     Susceptibility      Staphylococcus epidermidis (2)    Antibiotic Interpretation Microscan  Method Status    benzylpenicillin Resistant >=0.5 mcg/mL BACTERIAL SUSCEPTIBILITY PANEL BY ROBEL     clindamycin Sensitive <=0.25 mcg/mL BACTERIAL SUSCEPTIBILITY PANEL BY ROBEL     erythromycin Resistant >=8 mcg/mL BACTERIAL SUSCEPTIBILITY PANEL BY ROBEL     gentamicin Sensitive <=0.5 mcg/mL BACTERIAL SUSCEPTIBILITY PANEL BY ROBEL      Gentamicin is used only in combination with other active agents that   test susceptible. inducible clindamycin resistance Sensitive NEGATIVE mcg/mL BACTERIAL SUSCEPTIBILITY PANEL BY ROBEL     oxacillin Resistant >=4 mcg/mL BACTERIAL SUSCEPTIBILITY PANEL BY ROBEL     tetracycline Resistant >=16 mcg/mL BACTERIAL SUSCEPTIBILITY PANEL BY ROBEL     trimethoprim-sulfamethoxazole Sensitive 20 mcg/mL BACTERIAL SUSCEPTIBILITY PANEL BY ROBEL     vancomycin Sensitive 1 mcg/mL BACTERIAL SUSCEPTIBILITY PANEL BY ROBEL            Impression:       1.  Fluid collection is seen over the lower lateral aspect of the chest and also abdomen adjacent to the muscle. This could represent abscess. No contrast was administered. There also is infiltration of the fat anterior to the fluid collection. 2. No evidence for appendicitis, diverticulitis or obstruction   3. Patient is status post cholecystectomy and drains are seen in place on the right. All CT scans at this facility use dose modulation, iterative reconstruction, and/or weight based dosing when appropriate to reduce radiation dose to as low as reasonably    achievable. IMPRESSION:    · Septic versus cardiogenic shock   · Coag negative staph (MRSE) bacteremia  · Acute cholecystitis status post laparoscopic cholecystectomy  · ?  Abdominal wall hematoma  · Acute on chronic kidney failure  · Obstructive sleep apnea on CPAP at home  · Acute urine retention status post Colon catheter placement      Patient Active Problem List   Diagnosis    Major depressive disorder, recurrent, severe with psychotic symptoms ([address]    Diabetes mellitus ([address]    Hypertension    HLD (hyperlipidemia)    Thyroid disease    Congestive heart failure (HCC)    Non-pressure ulcer of toe (HCC)    Atherosclerotic PVD with intermittent claudication ([facility])    Osteomyelitis ([address]    Infection of skin    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    Surgical wound dehiscence, initial encounter    S/P amputation of lesser toe, right (HCC)    Rectal bleeding    Athscl native arteries of left leg w ulceration oth prt foot ([address]    JESICA (obstructive sleep apnea)    Secondary diabetes mellitus ([address]    Chest pain    Peripheral vascular disease ([address]    Cellulitis    Syncope and collapse    Nonrheumatic mitral (valve) insufficiency    Ischemic myocardial dysfunction    Pulmonary hypertension (HCC)    Acute on chronic combined systolic and diastolic congestive heart failure (HCC)    Asthma    Acute kidney injury superimposed on CKD (HCC)    Dizziness    Acute cerebrovascular accident ([address]    Abnormal gait    Anxiety    Gastritis, unspecified, without bleeding    Pure hypercholesterolemia    Schizophrenia [facility]    Coronary arteriosclerosis    Extrapyramidal disease    Gangrene of toe (HCC)    Drug-induced hypotension    Osteomyelitis of right foot (HCC)    Nausea and vomiting    Mild intermittent asthma without complication    Heart failure, diastolic, with acute decompensation [facility]    Major depressive disorder, single episode, unspecified    Type 2 diabetes mellitus with diabetic neuropathy, unspecified (HCC)    Obesity (BMI 30-39. 9)    Disorder of carotid artery ([facility])    NSTEMI (non-ST elevated myocardial infarction) [facility]    Pneumonia    CKD (chronic kidney disease) stage 3, GFR 30-59 ml/min ([facility])    Pain in right hand    Anesthesia of skin    Carpal tunnel syndrome    History of angioplasty of peripheral vessel    History of coronary artery bypass surgery    Paresthesia of skin    Acute decompensated heart failure (HCC)    Depression    Abdominal pain    Abdominal pain, right upper quadrant    Shock ([facility]    Gram-positive bacteremia    Acute cholecystitis       PLAN:  · Continue IV vancomycin and meropenem for few more days  · CRP in am  · Follow-up repeat blood cultures  · Follow-up CBC BMP  · SNF on D/C    Discussed with patient and adult child    Jose Fregoso MD

## 2022-05-03 NOTE — PROGRESS NOTES
Physical Therapy Med Surg Daily Treatment Note  Facility/Department: [facility]  Room: A7/B918-69       NAME: Nuha Walker  : 1955 (61 y.o.)  MRN: [id number]  CODE STATUS: Full Code    Date of Service: 5/3/2022    Patient Diagnosis(es): Abdominal pain, right upper quadrant [R10.11]  Abdominal pain [R10.9]  Intractable vomiting with nausea, unspecified vomiting type [R11.2]   Chief Complaint   Patient presents with    Abdominal Pain     n/v, Discharged from ER a couple days prior      Patient Active Problem List    Diagnosis Date Noted    Shock ([facility]     Gram-positive bacteremia     Acute cholecystitis     Abdominal pain, right upper quadrant     Abdominal pain 2022    Depression     Acute decompensated heart failure ([address] 2021    History of angioplasty of peripheral vessel 2021    History of coronary artery bypass surgery 2021    Pneumonia 2021    CKD (chronic kidney disease) stage 3, GFR 30-59 ml/min ([facility])     Pain in right hand     Carpal tunnel syndrome 2021    NSTEMI (non-ST elevated myocardial infarction) ([facility] 2021    Anesthesia of skin 2021    Paresthesia of skin 2021    Disorder of carotid artery ([facility] 2021    Obesity (BMI 30-39.9) 2020    Heart failure, diastolic, with acute decompensation ([address] 2020    Nausea and vomiting     Abnormal gait 2020    Gangrene of toe ([address] 2020    Acute cerebrovascular accident ([facility] 2020    Dizziness 2020    Acute on chronic combined systolic and diastolic congestive heart failure ([address] 2020    Acute kidney injury superimposed on CKD ([address] 2020    Nonrheumatic mitral (valve) insufficiency 2020    Pulmonary hypertension ([address] 2020    Syncope and collapse 2020    Cellulitis 2020    Chest pain 2020    JESICA (obstructive sleep apnea)     Athscl native arteries of left leg w ulceration oth prt foot ([address] 2019    Rectal bleeding     Surgical wound dehiscence, initial encounter 2019    S/P amputation of lesser toe, right ([address] 2019    Ischemic myocardial dysfunction 2019    Coronary arteriosclerosis 2019    Extrapyramidal disease 2019    Drug-induced hypotension 2019    Osteomyelitis of right foot ([address] 2019    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     Infection of skin 2019    Osteomyelitis ([address] 2019    Atherosclerotic PVD with intermittent claudication ([address] 2019    Peripheral vascular disease ([address] 2019    Non-pressure ulcer of toe ([address] 2019    Asthma 2019    Anxiety 2019    Gastritis, unspecified, without bleeding 2019    Pure hypercholesterolemia 2019    Schizophrenia ([address] 2019    Mild intermittent asthma without complication     Type 2 diabetes mellitus with diabetic neuropathy, unspecified ([address] 2019    Major depressive disorder, single episode, unspecified 2019    Diabetes mellitus ([address] 2019    Hypertension 2019    HLD (hyperlipidemia) 2019    Thyroid disease 2019    Congestive heart failure ([address] 2019    Secondary diabetes mellitus ([address] 2019    Major depressive disorder, recurrent, severe with psychotic symptoms ([address] 2019        Past Medical History:   Diagnosis Date    Asthma     CAD (coronary artery disease)     CKD (chronic kidney disease) stage 3, GFR 30-59 ml/min ([facility]     Colitis     Diabetes mellitus ([address]     Hyperlipidemia     Hypertension     PAD (peripheral artery disease) ([address]     Prolonged emergence from general anesthesia     PVD (peripheral vascular disease) ([address]     Thyroid disease      Past Surgical History:   Procedure Laterality Date    CARDIAC SURGERY      CATARACT REMOVAL WITH IMPLANT Bilateral 11- 11-     SECTION      CHOLECYSTECTOMY, LAPAROSCOPIC N/A 4/25/2022    LAPAROSCOPIC CHOLECYSTECTOMY performed by Chrissy Torres MD at [address] 8/20/2019    COLONOSCOPY DIAGNOSTIC performed by Alexsandra Lipscomb MD at [address] [facility]  06/2019    unknown vessels    HYSTERECTOMY      TOE AMPUTATION Right     3rd toe            Restrictions:MRSA /fall risk       SUBJECTIVE:   Subjective: \"therapia is here\"    Pain  Pain: discomfort throughout movement generalized    OBJECTIVE:   Orientation  Overall Orientation Status: Within Functional Limits  Orientation Level: Oriented to person;Oriented to situation;Oriented to place  Cognition  Overall Cognitive Status: Exceptions  Following Commands: Follows one step commands consistently  Attention Span: Attends with cues to redirect  Cognition Comment: pt able to follow commands and asked if she could practice standing once edge of bed. Bed Mobility Training  Bed Mobility Training: Yes  Overall Level of Assistance: Moderate assistance  Interventions: Weight shifting training/pressure relief  Rolling: Minimum assistance; Moderate assistance  Supine to Sit: Minimum assistance;Assist X2;Moderate assistance  Sit to Supine: Minimum assistance; Moderate assistance;Assist X2  Scooting: Moderate assistance  Duration: 15    Transfer Training  Transfer Training: Yes  Overall Level of Assistance: Contact-guard assistance;Assist X2  Interventions: Weight shifting training/pressure relief; Tactile cues  Sit to Stand: Contact-guard assistance;Assist X2  Stand to Sit: Contact-guard assistance;Assist X2  Duration: 8    Gait Training: No    Vitals  SpO2: 100 %  O2 Device: Nasal cannula          ASSESSMENT pt agreeable to sitting up and asked if she could stand. Pt tolerated being edge of bed and did not lose her balance. Pt declined getting up to a chair today. States she is weak and felt her knees where going to give out.          Discharge Recommendations:  Continue to assess pending progress,Patient would benefit from continued therapy after discharge         Goals  Long Term Goals  Long term goal 1: Pt to complete rolling L<>R in supine with ModA  Long term goal 2: Pt to complete transitions sit<>supine with ModA  Long term goal 3: Pt to tolerate 15min activities sitting unsupported EOB with CGA  Goals updated this date due to pt requiring decreased assistance for functional mobility therefore standing, transfers, and amb added to POC  Electronically signed by Den Whitney PT on 5/3/2022 at 1:02 PM    PLAN    Plan: 1 time a day 3-6 times a week        [facility] (6 CLICK) BASIC MOBILITY  AM-PAC Inpatient Mobility Raw Score : 8     Therapy Time   Individual   Time In  1040   Time Out 1103   Minutes 23   23 minutes bed mob/transfers/seated activity/balance. Keli Hoang PTA, 05/03/22 at 12:20 PM         Definitions for assistance levels  Independent = pt does not require any physical supervision or assistance from another person for activity completion. Device may be needed.   Stand by assistance = pt requires verbal cues or instructions from another person, close to but not touching, to perform the activity  Minimal assistance= pt performs 75% or more of the activity; assistance is required to complete the activity  Moderate assistance= pt performs 50% of the activity; assistance is required to complete the activity  Maximal assistance = pt performs 25% of the activity; assistance is required to complete the activity  Dependent = pt requires total physical assistance to accomplish the task

## 2022-05-03 NOTE — PROGRESS NOTES
Nephrology Progress Note    Assessment:  RADHA hypovolemia  S/P heather with persistent pain  DM type-2  Hx Hypertension  OHDx HF history  Psych issues        Plan: Will start IV fluids  Follow I&O/weight     Patient Active Problem List:     Major depressive disorder, recurrent, severe with psychotic symptoms ([address]     Diabetes mellitus ([facility]     Hypertension     HLD (hyperlipidemia)     Thyroid disease     Congestive heart failure (HCC)     Non-pressure ulcer of toe ([facility])     Atherosclerotic PVD with intermittent claudication ([facility])     Osteomyelitis ([facility])     Infection of skin     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     Surgical wound dehiscence, initial encounter     S/P amputation of lesser toe, right ([facility])     Rectal bleeding     Athscl native arteries of left leg w ulceration oth prt foot ([facility])     JESICA (obstructive sleep apnea)     Secondary diabetes mellitus ([facility])     Chest pain     Peripheral vascular disease ([facility])     Cellulitis     Syncope and collapse     Nonrheumatic mitral (valve) insufficiency     Ischemic myocardial dysfunction     Pulmonary hypertension (HCC)     Acute on chronic combined systolic and diastolic congestive heart failure (HCC)     Asthma     Acute kidney injury superimposed on CKD ([facility])     Dizziness     Acute cerebrovascular accident ([facility]     Abnormal gait     Anxiety     Gastritis, unspecified, without bleeding     Pure hypercholesterolemia     Schizophrenia ([facility]     Coronary arteriosclerosis     Extrapyramidal disease     Gangrene of toe (HCC)     Drug-induced hypotension     Osteomyelitis of right foot ([facility])     Nausea and vomiting     Mild intermittent asthma without complication     Heart failure, diastolic, with acute decompensation ([facility])     Major depressive disorder, single episode, unspecified     Type 2 diabetes mellitus with diabetic neuropathy, unspecified ([facility])     Obesity (BMI 30-39. 9)     Disorder of carotid artery ([facility])     NSTEMI (non-ST elevated myocardial infarction) ([facility]     Pneumonia     CKD (chronic kidney disease) stage 3, GFR 30-59 ml/min ([facility])     Pain in right hand     Anesthesia of skin     Carpal tunnel syndrome     History of angioplasty of peripheral vessel     History of coronary artery bypass surgery     Paresthesia of skin     Acute decompensated heart failure (HCC)     Depression     Abdominal pain     Abdominal pain, right upper quadrant     Shock ([facility]     Gram-positive bacteremia     Acute cholecystitis      Subjective:  Admit Date: 4/22/2022    Interval History: stomach discomfort    Medications:  Scheduled Meds:   albumin human  25 g IntraVENous Once    vancomycin  750 mg IntraVENous Q24H    dicyclomine  20 mg IntraMUSCular 4x Daily    miconazole   Topical BID    enoxaparin  30 mg SubCUTAneous BID    [Held by provider] furosemide  40 mg IntraVENous BID    metoprolol  5 mg IntraVENous 4 times per day    insulin lispro  0-6 Units SubCUTAneous TID WC    insulin lispro  0-3 Units SubCUTAneous Nightly    [Held by provider] potassium chloride  20 mEq Oral BID WC    sodium chloride flush  5-40 mL IntraVENous 2 times per day    meropenem  1,000 mg IntraVENous Q8H    prazosin  1 mg Oral Nightly    rocuronium  1 mg/kg IntraVENous Once    pantoprazole (PROTONIX) 40 mg injection  40 mg IntraVENous Daily    sennosides-docusate sodium  2 tablet Oral BID    vancomycin (VANCOCIN) intermittent dosing (placeholder)   Other RX Placeholder    dextrose bolus (hypoglycemia)  250 mL IntraVENous Once    sodium chloride flush  5-40 mL IntraVENous 2 times per day    sertraline  200 mg Oral Daily    montelukast  10 mg Oral Nightly    [Held by provider] metoprolol succinate  50 mg Oral BID    levothyroxine  50 mcg Oral Daily    [Held by provider] isosorbide dinitrate  20 mg Oral TID    haloperidol  5 mg Oral Daily    busPIRone  10 mg Oral BID    [Held by provider] atorvastatin  80 mg Oral Nightly    polyvinyl alcohol  1 drop Both Eyes BID    [Held by provider] ranolazine  1,000 mg Oral BID    [Held by provider] meclizine  25 mg Oral BID    [Held by provider] hydrALAZINE  50 mg Oral 3 times per day    insulin glargine  40 Units SubCUTAneous Nightly     Continuous Infusions:   sodium chloride      sodium chloride      sodium chloride 25 mL (04/29/22 1850)    sodium chloride      dextrose         CBC:   Recent Labs     05/02/22  0600 05/03/22  0600   WBC 11.8* 11.7*   HGB 8.2* 8.2*    154     CMP:    Recent Labs     05/01/22  0600 05/01/22  0600 05/02/22  0600 05/03/22  0600     --  136 133*   K 4.8  4.8   < > 4.8  4.8 4.6  4.6     --  103 101   CO2 21  --  21 20   BUN 35*  --  45* 50*   CREATININE 1.40*  --  1.81* 1.90*   GLUCOSE 172*  --  162* 124*   CALCIUM 8.4*  --  8.5 8.3*   LABGLOM 37.8*  --  28.1* 26.6*    < > = values in this interval not displayed. Troponin: No results for input(s): TROPONINI in the last 72 hours. BNP: No results for input(s): BNP in the last 72 hours. INR: No results for input(s): INR in the last 72 hours. Lipids: No results for input(s): CHOL, LDLDIRECT, TRIG, HDL, AMYLASE, LIPASE in the last 72 hours. Liver:   Recent Labs     05/03/22  0600   AST 32   ALT 15   ALKPHOS 104   PROT 6.0*   LABALBU 2.6*   BILITOT 0.5     Iron:  No results for input(s): IRONS, FERRITIN in the last 72 hours. Invalid input(s): LABIRONS  Urinalysis: No results for input(s): UA in the last 72 hours.     Objective:  Vitals: /80   Pulse 76   Temp 97.5 °F (36.4 °C) (Oral)   Resp 17   Ht 5' 4\" (1.626 m)   Wt 245 lb (111.1 kg)   SpO2 98%   BMI 42.05 kg/m²    Wt Readings from Last 3 Encounters:   05/02/22 245 lb (111.1 kg)   04/20/22 225 lb (102.1 kg)   04/11/22 226 lb (102.5 kg)      24HR INTAKE/OUTPUT:      Intake/Output Summary (Last 24 hours) at 5/3/2022 0826  Last data filed at 5/3/2022 3612  Gross per 24 hour   Intake 1050 ml   Output 605 ml   Net 445 ml       General: alert, in no apparent distress  HEENT: normocephalic, atraumatic, anicteric  Neck: supple, no mass  Lungs: non-labored respirations, clear to auscultation bilaterally  Heart: regular rate and rhythm, no murmurs or rubs  Abdomen: soft, non-tender, non-distended  Ext: no cyanosis, no peripheral edema  Neuro: alert and oriented, no gross abnormalities  Psych: normal mood and affect  Skin: no rash      Electronically signed by Mckayla Bhat DO, MD

## 2022-05-04 NOTE — PROGRESS NOTES
INPATIENT PROGRESS NOTES    PATIENT NAME: Romel Rai  MRN: [id number]  SERVICE DATE:  May 4, 2022   SERVICE TIME:  7:58 AM      PRIMARY SERVICE: Pulmonary Disease    CHIEF COMPLAINTS: JESICA    INTERVAL HPI: Patient seen and examined at bedside, Interval Notes, orders reviewed. Nursing notes noted    Patient report no chest pain, no shortness of breath, denies coughing, she feels better, no nausea or vomiting, she is currently on 4 L O2 saturation 99%, no fever, refused CPAP overnight    Review of system:     GI Abdominal pain No  Skin Rash No    Social History     Tobacco Use    Smoking status: Never Smoker    Smokeless tobacco: Never Used   Substance Use Topics    Alcohol use: Never     History reviewed. No pertinent family history. OBJECTIVE    Body mass index is 44.8 kg/m². PHYSICAL EXAM:  Vitals:  BP (!) 137/54   Pulse 62   Temp 97.3 °F (36.3 °C) (Oral)   Resp 18   Ht 5' 4\" (1.626 m)   Wt 261 lb (118.4 kg)   SpO2 99%   BMI 44.80 kg/m²     General: alert, cooperative, no distress  Head: normocephalic, atraumatic  Eyes:No gross abnormalities. ENT:  MMM no lesions  Neck:  supple and no masses  Chest : Minimal basal rales, otherwise clear no wheezing, nontender, tympanic  Heart[de-identified] Heart sounds are normal.  Regular rate and rhythm without murmur, gallop or rub. ABD:  symmetric, soft, non-tender, no guarding or rebound  Musculoskeletal : no cyanosis, no clubbing and 2+  edema  Neuro:  Grossly normal  Skin: No rashes or nodules noted.   Lymph node:  no cervical nodes  Urology: yes Colon   Psychiatric: appropriate    DATA:   Recent Labs     05/03/22  0600 05/04/22  0555   WBC 11.7* 10.8   HGB 8.2* 8.5*   HCT 24.7* 25.9*   MCV 75.0* 75.1*    208     Recent Labs     05/03/22  0600 05/03/22  0600 05/04/22  0558   *  --  133*   K 4.6  4.6   < > 4.5  4.5     --  101   CO2 20  --  21   BUN 50*  --  53*   CREATININE 1.90*  --  1.82*   GLUCOSE 124*  --  92   CALCIUM 8.3*  --  8.3* PROT 6.0*  --  6.1*   LABALBU 2.6*  --  3.0*   BILITOT 0.5  --  0.6   ALKPHOS 104  --  113   AST 32  --  32   ALT 15   < > 14   LABGLOM 26.6*   < > 27.9*   GFRAA 32.2*   < > 33.8*   GLOB 3.4   < > 3.1    < > = values in this interval not displayed. MV Settings:     Vent Mode: CPAP  Vt (Set, mL): 330 mL  Resp Rate (Set): 32 bmp  FiO2 : 50 %  PEEP/CPAP (cmH2O): 5  Pressure Support: 5 cmH20  Peak Inspiratory Pressure: 11 cmH2O  Mean Airway Pressure: 16 cmH20  I:E Ratio: 1:1.10    No results for input(s): PHART, IZQ4JXI, PO2ART, GTG3WDM, BEART, S9MOFPAS in the last 72 hours. O2 Device: Nasal cannula  O2 Flow Rate (L/min): 4 L/min    ADULT DIET;  Dysphagia - Soft and Bite Sized; 4 carb choices (60 gm/meal)  ADULT ORAL NUTRITION SUPPLEMENT; Breakfast, Lunch, Dinner; Diabetic Oral Supplement     MEDICATIONS during current hospitalization:    Continuous Infusions:   sodium chloride 100 mL/hr at 05/03/22 2124    sodium chloride      sodium chloride 25 mL (04/29/22 1850)    sodium chloride      dextrose         Scheduled Meds:   vancomycin  750 mg IntraVENous Q24H    dicyclomine  20 mg IntraMUSCular 4x Daily    miconazole   Topical BID    enoxaparin  30 mg SubCUTAneous BID    [Held by provider] furosemide  40 mg IntraVENous BID    metoprolol  5 mg IntraVENous 4 times per day    insulin lispro  0-6 Units SubCUTAneous TID WC    insulin lispro  0-3 Units SubCUTAneous Nightly    [Held by provider] potassium chloride  20 mEq Oral BID WC    sodium chloride flush  5-40 mL IntraVENous 2 times per day    meropenem  1,000 mg IntraVENous Q8H    prazosin  1 mg Oral Nightly    rocuronium  1 mg/kg IntraVENous Once    pantoprazole (PROTONIX) 40 mg injection  40 mg IntraVENous Daily    sennosides-docusate sodium  2 tablet Oral BID    vancomycin (VANCOCIN) intermittent dosing (placeholder)   Other RX Placeholder    dextrose bolus (hypoglycemia)  250 mL IntraVENous Once    sodium chloride flush  5-40 mL IntraVENous 2 times per day    sertraline  200 mg Oral Daily    montelukast  10 mg Oral Nightly    [Held by provider] metoprolol succinate  50 mg Oral BID    levothyroxine  50 mcg Oral Daily    [Held by provider] isosorbide dinitrate  20 mg Oral TID    haloperidol  5 mg Oral Daily    busPIRone  10 mg Oral BID    [Held by provider] atorvastatin  80 mg Oral Nightly    polyvinyl alcohol  1 drop Both Eyes BID    [Held by provider] ranolazine  1,000 mg Oral BID    [Held by provider] meclizine  25 mg Oral BID    [Held by provider] hydrALAZINE  50 mg Oral 3 times per day    insulin glargine  40 Units SubCUTAneous Nightly       PRN Meds:HYDROmorphone, dextrose bolus (hypoglycemia) **OR** dextrose bolus (hypoglycemia), glucose, bisacodyl, sodium chloride flush, sodium chloride, atropine, sodium chloride, sodium chloride flush, sodium chloride, ondansetron **OR** ondansetron, polyethylene glycol, acetaminophen **OR** acetaminophen, albuterol, albuterol sulfate HFA, glucagon (rDNA), dextrose    Radiology  ECHO Complete 2D W Doppler W Color    Result Date: 4/27/2022  Transthoracic Echocardiography Report (TTE)  Demographics   Patient Name   Ronny You       Gender               Female                 Lenore Wagner   Patient Number [id number]            Race                 Other                                      Ethnicity             or    Visit Number   [id number]           Room Number          IC07   Corporate ID                       Date of Study        04/27/2022   Accession      [id number]          Referring Physician  Number   Date of Birth  1955          Sonographer          Angeles Kearney   Age            61 year(s)          Interpreting         [facility]                                     Physician            Nancy Reddy MD  Procedure Type of Study   TTE procedure:ECHO COMPLETE 2D W/DOP W/COLOR.   Procedure Date Date: 04/27/2022 Start: 09:55 AM Study Location: Portable Technical Quality: Adequate visualization Indications:Congestive heart failure. Patient Status: Routine Height: 64 inches Weight: 242 pounds BSA: 2.12 m^2 BMI: 41.54 kg/m^2 BP: 104/46 mmHg  Conclusions   Summary  Left ventricular ejection fraction is visually estimated at 45-50%. Near akinesis of septum, hypokinesis of septal aspect of apex and distal  most anteroseptal wall. Overall LVEF seems a bit better than 8/2021  Right ventricle global systolic function is mildly reduced . Moderately enlarged right ventricle cavity. Right ventricular systolic pressure of 47 mm Hg consistent with moderate  pulmonary hypertension. 8/2021 RVSP was 60 mm Hg  Mildly dilated left atrium. Markedly enlarged right atrium size. Normal mitral valve structure  3+ MR  Normal aortic valve structure and function. Normal tricuspid valve structure  Severe tricuspid regurgitation. Signature   ----------------------------------------------------------------  Electronically signed by Isis Leslie MD(Interpreting  physician) on 04/27/2022 05:08 PM  ----------------------------------------------------------------   Findings  Left Ventricle Left ventricular ejection fraction is visually estimated at 45-50%. Near akinesis of septum, hypokinesis of septal aspect of apex and distal most anteroseptal wall. Overall LVEF seems a bit better than 8/2021 Right Ventricle Right ventricle global systolic function is mildly reduced . Moderately enlarged right ventricle cavity. Right ventricular systolic pressure of 47 mm Hg consistent with moderate pulmonary hypertension. 8/2021 RVSP was 60 mm Hg Left Atrium Mildly dilated left atrium. Right Atrium Markedly enlarged right atrium size. Mitral Valve Normal mitral valve structure 3+ MR Tricuspid Valve Normal tricuspid valve structure Severe tricuspid regurgitation. Aortic Valve Normal aortic valve structure and function. Pulmonic Valve The pulmonic valve was not well visualized .  Pericardial Effusion No evidence of pericardial effusion. Pleural Effusion No evidence of pleural effusion. Aorta \ Miscellaneous Aortic root dimension within normal limits. M-Mode Measurements (cm)   LVIDd: 4.63 cm                         LVIDs: 3.58 cm  IVSd: 1.18 cm                          IVSs: 1.45 cm  LVPWd: 1.42 cm                         AO Root Dimension: 2.54 cm  Rt. Vent. Dimension: 3.32 cm                                         LVOT: 1.98 cm  Doppler Measurements:   TR Velocity:3.04 m/s  TR Gradient:37.03 mmHg                                     Estimated RAP:10 mmHg                                     RVSP:47.03 mmHg  Valves  Tricuspid Valve   Estimated RVSP: 47.03 mmHg              Estimated RAP: 10 mmHg  TR Velocity: 3.04 m/s                   TR Gradient: 37.03 mmHg   Pulmonic Valve            Estimated PASP: 47.03 mmHg   LVOT   LVOT Diameter: 1.98 cm  Structures  Left Ventricle   Diastolic Dimension: 4.66 cm         Systolic Dimension: 0.28 cm  Septum Diastolic: 9.75 cm            Septum Systolic: 6.71 cm  PW Diastolic: 2.34 cm                                       FS: 22.7 %  LV EDV/LV EDV Index: 98.85 ml/47 m^2 LV ESV/LV ESV Index: 53.69 ml/25 m^2  EF Calculated: 45.7 %   LVOT Diameter: 1.98 cm   Right Atrium   RA Systolic Pressure: 10 mmHg   Right Ventricle   Diastolic Dimension: 1.59 cm                                    RV Systolic Pressure: 12.68 mmHg  Aorta/ Miscellaneous Aorta   Aortic Root: 2.54 cm  LVOT Diameter: 1.98 cm      CT ABDOMEN PELVIS WO CONTRAST Additional Contrast? Radiologist Recommendation    Result Date: 5/2/2022  COMPARISON: April 20, 2022; April 23, 2022 HISTORY:  Severe abdominal pain s/p Lap Teri 4/25 COMMENTS: R10.11 Abdominal pain, right upper quadrant ICD10 TECHNIQUE: procedure Thin slice spiral CT was performed from the lung bases through the iliac crests without contrast administration. Contrast enhancement was not employed due to clinician's order.  Sagittal and coronal reconstructions were also performed. FINDINGS: Images through the lung bases demonstrate groundglass opacities and/or atelectasis. In the subcutaneous fat over the lateral aspect of the lower chest and abdomen there is infiltration and also a fluid collection is seen adjacent to the muscle that measures 12.4 x 4.9 x 15.27 cm. The gallbladder is surgically absent and a drain is seen in the gallbladder fossa exiting the anterior abdomen on the right. Non-contrast images of the liver shows that it appears to be decreased in attenuation with no intrahepatic ductal dilation seen. The pancreas, spleen and adrenals are unremarkable. No hydro-nephrosis, renal calculi or ijeoma-nephric fluid seen in the kidneys. Mild perinephric stranding is seen about both kidneys. No aneurysm  is visualized. Diffuse atherosclerotic calcifications are visualized. No  dilated loops of bowel, free air or free fluid is seen there is mild stranding seen lateral to the descending colon. The visualized portion of the appendix is unremarkable. . An umbilical hernia is seen that contains fat. A Colon catheter is seen in the urinary bladder. No destructive bony lesions are seen. There is evidence of median sternotomy. 1. Fluid collection is seen over the lower lateral aspect of the chest and also abdomen adjacent to the muscle. This could represent abscess. No contrast was administered. There also is infiltration of the fat anterior to the fluid collection. 2. No evidence for appendicitis, diverticulitis or obstruction 3. Patient is status post cholecystectomy and drains are seen in place on the right. All CT scans at this facility use dose modulation, iterative reconstruction, and/or weight based dosing when appropriate to reduce radiation dose to as low as reasonably  achievable.     CT ABDOMEN PELVIS WO CONTRAST Additional Contrast? None    Result Date: 4/23/2022  EXAM:  CT ABDOMEN PELVIS WO CONTRAST History: Abdominal pain Technique: Multiple contiguous axial images were obtained of the abdomen and pelvis from the level of the lung bases through the ischial tuberosities without contrast. Multiplanar reformats were obtained. Comparison: CT abdomen pelvis April 20, 2022 Findings: Lung bases are clear. Heart size is enlarged. Mitral annular calcification. Evidence of prior thoracic surgery. Lack of intravenous contrast precludes optimal evaluation of the abdominal and pelvic viscera. The unenhanced liver, spleen, stomach, pancreas, and adrenal glands appear within normal limits. The gallbladder is mildly distended but not significant changed from recent CT. There are debris is present within the gallbladder lumen. No gallbladder wall thickening or pericholecystic fluid identified by CT. Mild bilateral perinephric stranding. No urinary tract calculi or hydronephrosis. The urinary bladder is decompressed. The uterus is absent. Abdominal aorta is nonaneurysmal  . No retroperitoneal or abdominal/pelvic lymphadenopathy. No small bowel obstruction. No overt colonic mass or pericolonic inflammation. No findings of acute appendicitis No free fluid, loculated fluid collection, or pneumoperitoneum. No acute osseous abnormality. Mildly distended gallbladder containing gallbladder sludge and/or tiny gallstones without significant interval change since April 20, 2022 CT. No CT findings of acute cholecystitis. Mild bilateral perinephric stranding is nonspecific. Correlation for polynephritis is recommended. All CT scans at this facility use dose modulation, iterative reconstruction, and/or weight based dosing when appropriate to reduce radiation dose to as low as reasonably achievable. XR CHEST (2 VW)    Result Date: 4/28/2022  EXAMINATION: Chest x-ray, 2 view HISTORY: Acute respiratory failure TECHNIQUE: Frontal and lateral views of the chest COMPARISON: Portable chest radiograph April 27, 2022 FINDINGS: Interval removal of the endotracheal tube and enteric tube.  Right and left jugular catheters remain. Heart size is enlarged. Interval development of right midlung and right lung base opacities. No significant interval change of left lung opacities. No pneumothorax. No acute osseous abnormality. Interval development of right lung infiltrate and/or atelectasis. Otherwise no significant interval change. XR ABDOMEN (KUB) (SINGLE AP VIEW)    Result Date: 4/30/2022  EXAMINATION: XR ABDOMEN (KUB) (SINGLE AP VIEW) CLINICAL HISTORY:  abd pain COMPARISONS:  NONE AVAILABLE TECHNIQUE:  Anterior x-ray views of the abdomen and pelvis. FINDINGS: There is a surgical drain in the right upper quadrant. There is a Colon catheter in place. There are surgical skin staples in the midline and in the left upper and left lower quadrants. The bowel gas pattern is without evidence of obstruction or distended bowel. There is retained fecal material throughout the colon consistent with constipation. No abnormal calcifications. The soft tissues are within normal limits. No acute osseous findings. There are no acute intra-abdominal changes. There is a surgical drain in the right upper quadrant and there are surgical skin staples at several locations in the anterior abdomen. CT HEAD WO CONTRAST    Result Date: 4/27/2022  CT Brain. Contrast medium:  without contrast.. History:  Nonresponsive. Technical factors: CT imaging of the brain was obtained and formatted as 5 mm contiguous axial images. 2.5 mm contiguous axial images were obtained through the osseous structures. Sagittal and coronal reconstruction obtained during postprocessing. Comparison:  CT brain May 6, 2020, April 21, 2020. MRI brain May 7, 2020. Findings: Extra-axial spaces:  Normal. Intracranial hemorrhage:  None. Ventricular system: [Negative. Basal Cisterns:  Without anomaly. Cerebral Parenchyma: Without anomaly. Midline Shift:  None. Cerebellum:  No anomaly identified.  Paranasal sinuses and mastoid air cells:  Small air-fluid levels, bilateral maxillary sinuses, left sphenoid sinus. Partial opacification ethmoid sinuses. Visualized Orbits:  Negative. Impression: Pansinusitis. All CT scans at this facility use dose modulation, iterative reconstruction, and/or weight based dosing when appropriate to reduce radiation dose to as low as reasonably achievable. CTA CHEST W WO CONTRAST    Result Date: 4/30/2022  EXAM:CTA CHEST W WO CONTRAST DATE4/30/2022 8:51 AM: REASON FOR EXAM:Acute severe anterior chest wall pain. r/o PE COMPARISON: none Technique: Helical CT was performed through the chest following the IV administration of100  cc of nonionic contrast. 3-D MIP reconstructions were performed on an independent workstation in the coronal plane with thick slab technique utilized in the interpretation. 3-D maximum intensity projection performed. All CT scans at this facility use dose modulation, iterative reconstruction, and/or weight based dosing when appropriate to reduce radiation dose to as low as reasonably achievable. CHEST CTA  FINDINGS: Mediastinum: There are mild hilar and mediastinal lymph nodes, likely related to reactive nodes. The chest wall and lower neck are normal Heart: The heart is enlarged, cardiomegaly. There is no pericardial effusion. Vascular structures: There is no evidence for thoracic aortic aneurysm or dissection. No evidence of traumatic aortic injury. There are no persistent filling defects within the pulmonary arteries. Lungs: There are patchy groundglass alveolar alveolar opacities in both lungs predominantly in the lung bases which are nonspecific for alveolitis, pneumonitis. Pleura: No pleural effusion or thickening. Upper abdomen: The visualized portions of the upper abdomen are unremarkable. Bones/axillae/soft tissues: Osseous structures and chest wall are normal.     Negative CTA of the chest. No evidence of thoracic aortic dissection or aneurysm. The study is negative for pulmonary emboli.  There are nonspecific groundglass opacities in both lungs which may secondary to inflammation, infection. There are shotty mediastinal lymphadenopathy. NM LUNG SCAN PERFUSION ONLY    Result Date: 4/28/2022  EXAMINATION: PERFUSION ONLY SCINTIGRAPHY CLINICAL HISTORY: 29-year-old with new onset acidosis, worsening renal failure and cardiac disease. Patient has been intubated. TECHNIQUE: 5.1 mCi of technetium labeled MAA were utilized for pulmonary perfusion only scintigraphy. Planar images of the chest were obtained in the anterior, posterior, lateral, and oblique planes. Comparison made with a AP chest x-ray from 4/28/2022 which demonstrates bilateral pulmonary opacities in both mid and lower lung zones. FINDINGS: Perfusion images are abnormal. There are perfusion defects involving the lingula and basilar segments of the left lower lobe. There are also perfusion defects involving much of the right lower lobe. No ventilation images to assess for a matching defects. ABNORMAL PERFUSION SCINTIGRAPHY WITH BILATERAL PERFUSION DEFECTS. THEREFORE, THE STUDY IS INDETERMINATE FOR ASSESSMENT OF ACUTE PULMONARY EMBOLUS    CT ABDOMEN PELVIS W IV CONTRAST Additional Contrast? None    Result Date: 4/20/2022  EXAMINATION: CT ABDOMEN PELVIS W IV CONTRAST DATE AND TIME:4/20/2022 7:19 AM CLINICAL HISTORY: Acute abdominal pain. Epigastric pain. abd and chest pain x 2 days  COMPARISON: August 15, 2021 TECHNIQUE: Contiguous axial CT sections of the abdomen and pelvis. 100 cc's of IV contrast given. .  All CT scans at this facility use dose modulation, iterative reconstruction, and/or weight based dosing when appropriate to reduce radiation dose to as low as reasonably achievable.  Some of this report was completed using  [employer] [id number] and may include unintended errors with respect to translation of words, typographical errors or grammatical errors which may not have been identified prior to the finalization of this report. FINDINGS: Incidental gallstones again noted without secondary signs of acute gallbladder disease. Hepatic steatosis. Spleen pancreas and kidneys are negative. No diverticulitis or colitis. No bowel obstruction. No aneurysm. Bones intact. IMPRESSION: NO ACUTE PATHOLOGY. XR CHEST PORTABLE    Result Date: 4/27/2022  EXAMINATION: XR CHEST PORTABLE CLINICAL HISTORY: RIGHT LINE PLACED COMPARISONS: APRIL 27, 2022, 0935 HOURS, APRIL 20, 2022 FINDINGS: Tip of endotracheal tube 4.2 cm superior to denise. Nasogastric tube courses into stomach. Tip right jugular vein central line at cephalad margin superior vena cava. Tip of left jugular vein central line in left brachiocephalic vein. Median sternotomy. Cardiopericardial silhouette enlarged. Ill-defined area of increased opacity left mid left lower lung. Right lung clear. LEFT MID/LOWER LUNG ATELECTASIS/PNEUMONIA. XR CHEST PORTABLE    Result Date: 4/27/2022  EXAMINATION: Portable AP ERECT view of the chest. CLINICAL HISTORY:  ETT and Central line placement DATE: 4/27/2022 9:37 AM COMPARISONS: 4/20/2022 FINDINGS: Film(s) was obtained with a poor depth of inspiration. The heart is moderately enlarged, unchanged. There are mitral valve annular calcifications, seen previously. There are multiple sternal sutures and mediastinal clips present. The end of endotracheal tube lies 3.5 cm above the denise. The gastric catheter extends into the stomach. There are atherosclerotic calcifications in the aortic arch. This mild degree of Central vascular congestion. Small infiltrate/atelectasis in lung bases, left greater than right. No pleural effusion or pneumothorax. There are mild degenerative changes in spine. MODERATE CARDIAC ENLARGEMENT UNCHANGED. MILD CENTRAL VASCULAR CONGESTION. BIBASILAR INFILTRATES, LEFT GREATER THAN RIGHT.      XR CHEST PORTABLE    Result Date: 4/20/2022  EXAMINATION: XR CHEST PORTABLE CLINICAL HISTORY: CHEST AND ABDOMINAL PAIN FOR 2 DAYS COMPARISONS: CHEST RADIOGRAPH NOVEMBER 16, 2021, CT CHEST NOVEMBER 13, 2021 FINDINGS: Median sternotomy. Cardiopericardial silhouette upper limits normal, unchanged. Ill-defined areas increase opacity lower lung zones bilaterally. BILATERAL LOWER LUNG ATELECTASIS/PNEUMONIA    IR PICC WO SQ PORT/PUMP > 5 YEARS    Result Date: 5/2/2022  EXAMINATION: IR PICC WO SQ PORT/PUMP > 5 YEARS DATE AND TIME:4/29/2022 5:12 PM CLINICAL HISTORY: R10.11 Abdominal pain, right upper quadrant ICD10   access  COMPARISON: None available. Radiation dose: 43.8 mGy. After discussing the procedure and possible complications with the patient, informed consent was obtained. The patient was placed on the Special Procedures table. The right upper extremity was sterilely prepared using 2% chlorhexidine and then draped with sterile towels and a body-sized sterile drape. All personnel in the Special Procedures Room donned caps and surgical masks. In addition, the  and first assistant donned sterile gowns and gloves after proper hand cleansing. A pre-procedure time out was performed in order to assure the correct patient and procedure. Local anesthetic was administered. A peripheral vein was accessed with sonographic guidance. A sonographic spot image was obtained for documentation. A guidewire was advanced into the vein with fluoroscopic guidance and a sheath was placed over the guidewire. A 5-[place] dual-lumen PICC was advanced through the sheath, into the brachial, up the arm and into the central vasculature. It was positioned appropriately. The sheath was removed. The catheter was shown to aspirate and infuse properly. The flange of the catheter was affixed to the arm using a PICC securement device. A spot image of the chest showed the tip of the PICC line to lie in the right atrium. The patient tolerated the procedure well and without complications.   CONCLUSION: SUCCESSFUL PICC PLACEMENT WITHOUT IMMEDIATE COMPLICATIONS. US ABDOMEN LIMITED    Result Date: 4/23/2022  EXAM: US ABDOMEN LIMITED HISTORY: Right upper quadrant pain TECHNIQUE: Ultrasound evaluation was performed of the right upper quadrant of the abdomen. COMPARISON: CT abdomen pelvis April 23, 2022 FINDINGS: Normal echogenicity and contour of the liver. No liver lesion or intrahepatic biliary dilatation. Main portal vein is patent. The gallbladder is mildly distended. Layering echogenic material is identified within the lumen of the gallbladder. No pericholecystic fluid. Gallbladder wall thickness is at the upper limits of normal measuring 2.7 mm. Negative Velez sign reported. Common bile duct is normal in diameter measuring 4 mm. No overt abnormality of the pancreas. Nonspecific findings of the gallbladder including mildly distended gallbladder containing layering sludge and/or tiny gallstones with wall thickness of the upper limits of normal measuring 2.7 mm. No pericholecystic fluid to suggest acute cholecystitis. FL CHOLANGIOGRAM OR    Result Date: 4/25/2022  EXAMINATION: FL CHOLANGIOGRAM OR CLINICAL HISTORY:  pain COMPARISONS: None available. FINDINGS: Fluoroscopic assistance was provided during intraoperative cholangiogram. There is contrast opacification of the intrahepatic bile ducts, common hepatic duct, the distal cystic duct, and common bile duct. There is spillage of contrast into the duodenum. There are no persistent filling defects. Number of images: 183 The total radiation time is 12.3 seconds Radiation Dose is 2.71 rad. Fluoroscopic assistance was provided during intraoperative cholangiogram. Please refer to operative report. US DUP LOWER EXTREMITIES BILATERAL VENOUS    Result Date: 4/27/2022  EXAMINATION: US DUP LOWER EXTREMITIES BILATERAL VENOUS CLINICAL HISTORY: Shortness of breath. Bilateral leg pain and swelling.  COMPARISON: None TECHNIQUE: The bilateral lower extremity veins were evaluated with color doppler, gray scale imaging and spectral analysis while using compression and augmentation when possible. RESULT: Evaluation of the bilateral lower extremity veins from the thigh to the knee shows normal phasic flow, normal augmentation of the Doppler signal, and normal compression of the deep veins. There is no sonographic evidence for acute deep venous thrombosis from the bilateral groin to the popliteal region. There is no sonographic evidence for acute deep venous thrombosis in the bilateral segmentally visualized calf veins. No acute DVT of the bilateral lower extremity veins from the groin to the knee. There is no sonographic evidence for acute deep venous thrombosis in the bilateral segmentally visualized calf veins.              IMPRESSION AND SUGGESTION:  Patient is at risk due to   · Obstructive sleep apnea, noncompliant with CPAP  · Abnormal CT chest, likely dependent atelectasis  · Sepsis, ID managing  · Abdominal wall hematoma    Recommendation  · Encourage patient to use CPAP while asleep  · Antibiotics per ID  · O2 to keep sat 90 to 92%, wean as tolerated  · Incentive spirometer  · Watch volume status, avoid overload, consider DC IV fluid  · Consider Lasix, will defer to nephrology         Electronically signed by Mohit Oneil MD,  FCCP   on 5/4/2022 at 7:58 AM

## 2022-05-04 NOTE — DISCHARGE INSTR - COC
Continuity of Care Form    Patient Name: Jonathon Hammonds   :  1955  MRN:  [id number]    Admit date:  2022  Discharge date:  2022    Code Status Order: Full Code   Advance Directives:   [address] [facility] Documentation       Date/Time Healthcare Directive Type of Healthcare Directive Copy in [address] Agent's Name Healthcare Agent's Phone Number    22 1001 No, patient does not have an advance directive for healthcare treatment -- -- -- -- --            Admitting Physician: Mihai Cuadra MD  PCP: Veena Preciado    Discharging Nurse:     [address] Unit/Room#: G406/Y091-30  Discharging Unit Phone Number: ***    Emergency Contact:   Extended Emergency Contact Information  Primary Emergency Contact: [address] [place] [address] Phone: [phone]  Relation: Spouse  Secondary Emergency Contact: Kayden Motts ,radha  Mobile Phone: [phone]  Relation: Child  Preferred language: Turkmen   needed? Yes    Past Surgical History:  Past Surgical History:   Procedure Laterality Date    CARDIAC SURGERY      CATARACT REMOVAL WITH IMPLANT Bilateral 11- 11-     SECTION      CHOLECYSTECTOMY, LAPAROSCOPIC N/A 2022    LAPAROSCOPIC CHOLECYSTECTOMY performed by Camelia Horn MD at [address] [place] [address] N/A 2019    COLONOSCOPY DIAGNOSTIC performed by Ton Gu MD at [address] [place] [address].  2019    unknown vessels    HYSTERECTOMY      TOE AMPUTATION Right     3rd toe       Immunization History: There is no immunization history for the selected administration types on file for this patient.     Active Problems:  Patient Active Problem List   Diagnosis Code    Major depressive disorder, recurrent, severe with psychotic symptoms ([address] F33.3    Diabetes mellitus ([facility] E11.9    Hypertension I10    HLD (hyperlipidemia) E78.5    Thyroid disease E07.9 Congestive heart failure ([facility]) I50.9    Non-pressure ulcer of toe ([facility]) L97.509    Atherosclerotic PVD with intermittent claudication ([facility]) I70.219    Osteomyelitis ([facility]) M86.9    Infection of skin L08.9    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 A49.8    Surgical wound dehiscence, initial encounter T81.31XA    S/P amputation of lesser toe, right ([facility]) Z89.421    Rectal bleeding K62.5    Athscl native arteries of left leg w ulceration oth prt foot ([facility]) I70.245    JESICA (obstructive sleep apnea) G47.33    Secondary diabetes mellitus ([facility]) E13.9    Chest pain R07.9    Peripheral vascular disease ([facility]) I73.9    Cellulitis L03.90    Syncope and collapse R55    Nonrheumatic mitral (valve) insufficiency I34.0    Ischemic myocardial dysfunction I25.5    Pulmonary hypertension ([facility]) I27.20    Acute on chronic combined systolic and diastolic congestive heart failure ([facility]) I50.43    Asthma J45.909    Acute kidney injury superimposed on CKD ([facility]) N17.9, N18.9    Dizziness R42    Acute cerebrovascular accident ([facility] I63.9    Abnormal gait R26.9    Anxiety F41.9    Gastritis, unspecified, without bleeding K29.70    Pure hypercholesterolemia E78.00    Schizophrenia ([facility]) F20.9    Coronary arteriosclerosis I25.10    Extrapyramidal disease G25.9    Gangrene of toe ([facility]) I96    Drug-induced hypotension I95.2    Osteomyelitis of right foot ([facility]) M86.9    Nausea and vomiting R11.2    Mild intermittent asthma without complication J85.69    Heart failure, diastolic, with acute decompensation ([facility]) I50.33    Major depressive disorder, single episode, unspecified F32.9    Type 2 diabetes mellitus with diabetic neuropathy, unspecified ([facility]) E11.40    Obesity (BMI 30-39. 9) E66.9    Disorder of carotid artery ([facility]) I77.9    NSTEMI (non-ST elevated myocardial infarction) ([facility] I21.4    Pneumonia J18.9    CKD (chronic kidney disease) stage 3, GFR 30-59 ml/min ([facility]) N18.30    Pain in right hand M79.641    Anesthesia of skin R20.0    Carpal tunnel syndrome G56.00    History of angioplasty of peripheral vessel Z98.62    History of coronary artery bypass surgery Z95.1    Paresthesia of skin R20.2    Acute decompensated heart failure (HCC) I50.9    Depression F32. A    Abdominal pain R10.9    Abdominal pain, right upper quadrant R10.11    Shock ([address] R57.9    Gram-positive bacteremia R78.81    Acute cholecystitis K81.0       Isolation/Infection:   Isolation            Contact          Patient Infection Status       Infection Onset Added Last Indicated Last Indicated By Review Planned Expiration Resolved Resolved By    MRSA 08/09/19 08/11/19 03/05/20 Culture, Tissue        MRSA Blood 4.27.22 Electronically signed by Paola Michelle RN on 5/2/2022 at 9:58 AM         MRSA Left foot tissue on 3/5/2020. Electronically signed by Tashi Garcia RN on 3/9/2020 at 2:14 PM     MRSA left 3rd toe on 8/9/2019.  Electronically signed by Tashi Garcia RN on 8/11/2019 at 9:15 PM     Resolved    COVID-19 (Rule Out) 11/13/21 11/13/21 11/13/21 COVID-19 & Influenza Combo (Ordered)   11/13/21 Rule-Out Test Resulted    COVID-19 (Rule Out) 09/02/20 09/02/20 09/02/20 COVID-19 (Ordered)   09/02/20 Rule-Out Test Resulted            Nurse Assessment:  Last Vital Signs: BP (!) 137/54   Pulse 62   Temp 97.3 °F (36.3 °C) (Oral)   Resp 18   Ht 5' 4\" (1.626 m)   Wt 261 lb (118.4 kg)   SpO2 99%   BMI 44.80 kg/m²     Last documented pain score (0-10 scale): Pain Level: 7  Last Weight:   Wt Readings from Last 1 Encounters:   05/04/22 261 lb (118.4 kg)     Mental Status:  alert and tearful and restless at times    IV Access:  - None    Nursing Mobility/ADLs:  Walking   Assisted  Transfer  Assisted  Bathing  Assisted  Dressing  Dependent  Toileting  Dependent  Feeding  Independent  Med Admin  Assisted  Med Delivery   whole    Wound Care Documentation and Therapy:  Wound 04/29/22 Buttocks purple darkly pigmented (Active)   Wound Etiology Other 05/02/22 2011   Dressing Status Clean;Dry 22   Dressing/Treatment Zinc paste 22   Number of days: 5       Wound 22 Pelvis Anterior;Right Excoriation of pannus (Active)   Wound Cleansed Soap and water 22   Dressing/Treatment Other (comment) 22   Number of days: 4       Wound 22 Coccyx Abrasion (Active)   Wound Etiology Skin Tear 22   Dressing/Treatment Foam 22   Wound Assessment Pink/red 22   Drainage Amount None 22   Number of days: 4       Incision 22 Abdomen Medial;Upper (Active)   Dressing Status Clean;Dry; Intact 22   Dressing/Treatment Dry dressing 22   Closure [place] 22   Incision Assessment Other (Comment) 22   Drainage Amount Scant 22   Drainage Description Serosanguinous 22   Odor None 22   Number of days: 9        Elimination:  Continence: Bowel: No  Bladder: {YES / RH:48784}  Urinary Catheter: Insertion Date: ***   2022  Colostomy/Ileostomy/Ileal Conduit: No       Date of Last BM:     Intake/Output Summary (Last 24 hours) at 2022 1037  Last data filed at 2022 0800  Gross per 24 hour   Intake 1361.64 ml   Output 500 ml   Net 861.64 ml     I/O last 3 completed shifts: In: 2311.6 [P.O.:840; I.V.:821.2; IV Piggyback:650.5]  Out: 800 [Urine:800]    Safety Concerns: At Risk for Falls    Impairments/Disabilities:      Language Barrier - Greenlandic    Nutrition Therapy:  Current Nutrition Therapy:   - Oral Diet:  [address] ARIANNE Oral XORY:[id number]}    Routes of Feeding: {CHP DME Other Feedings:107908345}  Liquids: {Slp liquid thickness:69640}  Daily Fluid Restriction: no  Last Modified Barium Swallow with Video (Video Swallowing Test): {Done Not Done [facility]}    Treatments at the Time of Hospital Discharge:     Respiratory Treatments: ***  Oxygen Therapy:  is on oxygen at 2 L/min per nasal cannula.   Ventilator:    - No ventilator support REFUSES BIPAP    Rehab Therapies: {THERAPEUTIC INTERVENTION:0873763311}  Weight Bearing Status/Restrictions: [address] [employer] Weight Bearin}  Other Medical Equipment (for information only, NOT a DME order):  {EQUIPMENT:508970033}  Other Treatments: ***    Patient's personal belongings (please select all that are sent with patient):  {CHP DME Belongings:368008806}    RN SIGNATURE:  {Esignature:921437715}    CASE MANAGEMENT/SOCIAL WORK SECTION    Inpatient Status Date: 22    Readmission Risk Assessment Score:  Readmission Risk              Risk of Unplanned Readmission:  62           Discharging to Facility/ Agency   Name: [facility]  Address:  Phone: [phone]  Fax:    Dialysis Facility (if applicable)   Name:  Address:  Dialysis Schedule:  Phone:  Fax:    / signature: Electronically signed by Blayne Chavez LCSW on 22 at 10:38 AM EDT    PHYSICIAN SECTION    Prognosis: Fair    Condition at Discharge: Stable    Rehab Potential (if transferring to Rehab): Fair    Recommended Labs or Other Treatments After Discharge: cbc, bmp (results forwarded to PCP to review/address)    Physician Certification: I certify the above information and transfer of Carol Arguello  is necessary for the continuing treatment of the diagnosis listed and that she requires Segundo Zohaib for > or <30 days, pending clinical course.      Update Admission H&P: No change in H&P    PHYSICIAN SIGNATURE:  Electronically signed by Elza Soto DO on 22 at 4:26 PM EDT

## 2022-05-04 NOTE — DISCHARGE INSTR - OTHER ORDERS
Urology  Patient still being managed with Colon catheter  Patient to be discharged with Colon to be changed monthly    RENAL:  WEEKLY bmp. PLEASE send results to Dr Gilford Mai. Cardiology : ok to discharge.      Mental Health: follow up [facility]    Pulmonary: follow up 2 weeks with Dr Gennaro Patel  Encourage patient to use CPAP while asleep    O2 to keep sat 90 to 92%, wean as tolerated  Incentive spirometer    Surgery : follow up with Dr Shanta Membreno 5/10

## 2022-05-04 NOTE — PROGRESS NOTES
Physical Therapy Med Surg Daily Treatment Note  Facility/Department: [facility]  Room: Y399/V653-21       NAME: Som Calix  : 1955 (61 y.o.)  MRN: [id number]  CODE STATUS: Full Code    Date of Service: 2022    Patient Diagnosis(es): Abdominal pain, right upper quadrant [R10.11]  Abdominal pain [R10.9]  Intractable vomiting with nausea, unspecified vomiting type [R11.2]   Chief Complaint   Patient presents with    Abdominal Pain     n/v, Discharged from ER a couple days prior      Patient Active Problem List    Diagnosis Date Noted    Shock [facility]     Gram-positive bacteremia     Acute cholecystitis     Abdominal pain, right upper quadrant     Abdominal pain 2022    Depression     Acute decompensated heart failure ([address] 2021    History of angioplasty of peripheral vessel 2021    History of coronary artery bypass surgery 2021    Pneumonia 2021    CKD (chronic kidney disease) stage 3, GFR 30-59 ml/min ([facility])     Pain in right hand     Carpal tunnel syndrome 2021    NSTEMI (non-ST elevated myocardial infarction) ([facility] 2021    Anesthesia of skin 2021    Paresthesia of skin 2021    Disorder of carotid artery ([address] 2021    Obesity (BMI 30-39.9) 2020    Heart failure, diastolic, with acute decompensation ([address] 2020    Nausea and vomiting     Abnormal gait 2020    Gangrene of toe ([address] 2020    Acute cerebrovascular accident ([facility] 2020    Dizziness 2020    Acute on chronic combined systolic and diastolic congestive heart failure ([address] 2020    Acute kidney injury superimposed on CKD ([address] 2020    Nonrheumatic mitral (valve) insufficiency 2020    Pulmonary hypertension ([address] 2020    Syncope and collapse 2020    Cellulitis 2020    Chest pain 2020    JESICA (obstructive sleep apnea)     Athscl native arteries of left leg w ulceration oth prt foot ([address] 2019    Rectal bleeding     Surgical wound dehiscence, initial encounter 2019    S/P amputation of lesser toe, right ([address] 2019    Ischemic myocardial dysfunction 2019    Coronary arteriosclerosis 2019    Extrapyramidal disease 2019    Drug-induced hypotension 2019    Osteomyelitis of right foot ([address] 2019    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     Infection of skin 2019    Osteomyelitis ([address] 2019    Atherosclerotic PVD with intermittent claudication ([address] 2019    Peripheral vascular disease ([address] 2019    Non-pressure ulcer of toe ([address] 2019    Asthma 2019    Anxiety 2019    Gastritis, unspecified, without bleeding 2019    Pure hypercholesterolemia 2019    Schizophrenia ([address] 2019    Mild intermittent asthma without complication     Type 2 diabetes mellitus with diabetic neuropathy, unspecified ([address] 2019    Major depressive disorder, single episode, unspecified 2019    Diabetes mellitus ([address] 2019    Hypertension 2019    HLD (hyperlipidemia) 2019    Thyroid disease 2019    Congestive heart failure ([address] 2019    Secondary diabetes mellitus ([address] 2019    Major depressive disorder, recurrent, severe with psychotic symptoms ([address] 2019        Past Medical History:   Diagnosis Date    Asthma     CAD (coronary artery disease)     CKD (chronic kidney disease) stage 3, GFR 30-59 ml/min [facility]     Colitis     Diabetes mellitus ([address]     Hyperlipidemia     Hypertension     PAD (peripheral artery disease) ([address]     Prolonged emergence from general anesthesia     PVD (peripheral vascular disease) ([address]     Thyroid disease      Past Surgical History:   Procedure Laterality Date    CARDIAC SURGERY      CATARACT REMOVAL WITH IMPLANT Bilateral 11- 11-     SECTION      CHOLECYSTECTOMY, LAPAROSCOPIC N/A 4/25/2022    LAPAROSCOPIC CHOLECYSTECTOMY performed by Tamie Arreola MD at [address] 8/20/2019    COLONOSCOPY DIAGNOSTIC performed by Ave Hatfield MD at [address] [facility]  06/2019    unknown vessels    HYSTERECTOMY      TOE AMPUTATION Right     3rd toe     SUBJECTIVE:   Subjective: Patient resting in bed. Agreeable to tx. Daughter at beside. Patient declines need for . Pain  Pain: c/o bilateral knee pain with movement up to 9/10    OBJECTIVE:   Bed Mobility Training  Bed Mobility Training: Yes  Interventions: Verbal cues; Tactile cues; Weight shifting training/pressure relief  Rolling: Moderate assistance (to the Right)  Supine to Sit: Moderate assistance  Sit to Supine: Moderate assistance  Scooting: Moderate assistance    Transfer Training  Transfer Training: Yes  Interventions: Tactile cues; Verbal cues  Sit to Stand: Minimum assistance;Assist X2  Stand to Sit: Minimum assistance;Assist X2    Gait Training: No (Patient  declines, poor tolerance to standing)    ASSESSMENT   Assessment: Patient's tolerance to functional mobility continues to be limited by bilateral knee pain, fatigue, poor endurance. Patient completes wit ot stand x2. Unable to maintain static standing > 10 seconds due to pain. Encouraged patient to attempt laterally stepping/ transfer to chair however she declined. Daughter present for session.   Discharge Recommendations:  Continue to assess pending progress,Patient would benefit from continued therapy after discharge    Goals  Long Term Goals  Long term goal 1: Pt to complete rolling L<>R in supine with ModA  Long term goal 2: Pt to complete transitions sit<>supine with ModA  Long term goal 3: Pt to tolerate 15min activities sitting unsupported EOB with CGA  Long term goal 4: Pt will demonstrate transfers mod A with safest AD  Long term goal 5: Pt will demosntrate amb >/= 10ft with max A with safest AD    PLAN    Plan: 1 time a day 3-6 times a week  Safety Devices  Type of Devices: Bed alarm in place,Call light within reach,Left in bed     AMPAC (6 CLICK) BASIC MOBILITY  AM-PAC Inpatient Mobility Raw Score : 10     Therapy Time   Individual   Time In 0942   Time Out 0956   Minutes 14      bed mob/tr [address] [facility], 05/04/22 at 10:43 AM         Definitions for assistance levels  Independent = pt does not require any physical supervision or assistance from another person for activity completion. Device may be needed.   Stand by assistance = pt requires verbal cues or instructions from another person, close to but not touching, to perform the activity  Minimal assistance= pt performs 75% or more of the activity; assistance is required to complete the activity  Moderate assistance= pt performs 50% of the activity; assistance is required to complete the activity  Maximal assistance = pt performs 25% of the activity; assistance is required to complete the activity  Dependent = pt requires total physical assistance to accomplish the task

## 2022-05-04 NOTE — FLOWSHEET NOTE
9am Am nursing  assessment completed. Pt :               Alert and oriented. Resting comfortably in bed. Pts daughter is at bedside. Breakfast: completed -   RESP:       unlabored        Lung sounds:                 diminished                Oxygen:4L  Complaints of: n/a  Pain: denies   IV: double lumen PICC R arm- purple lumen infusing. TELE: NSR  Dressings: mepi on sacrum. Bilateral heal   Precautions:              Falls:   16     Joe: 70  Chart and meds reviewed. Noted Labs:   Plan for today: possible discharge to nursing home. Call light in reach. 10am- Dr. Chaka Corely secure messaged regarding IM bentyl. Order changed to PRN liquid. 1030- Alyssa Cortez, Hali and Cardio messaged regarding discharge planning. 215- Pt given PRN bentyl for abdominal cramping and frequent passing of stools. 300- PICC removed. Tele off. Pt given bed bath, new gown and socks prior to discharge. Family is present at bedside and all personal belongings were packed up for discharge. [facility] for transport.      NOTES:

## 2022-05-04 NOTE — PROGRESS NOTES
Nephrology Progress Note  Diffuse edema  Assessment:  RADHA  S/P Teri  COPD  DM type-2  OHDx EF 45%        Plan: will give higher dose lasix and one dose diuril IV add coreg    Patient Active Problem List:     Major depressive disorder, recurrent, severe with psychotic symptoms ([address]     Diabetes mellitus ([address]     Hypertension     HLD (hyperlipidemia)     Thyroid disease     Congestive heart failure (HCC)     Non-pressure ulcer of toe (HCC)     Atherosclerotic PVD with intermittent claudication ([facility])     Osteomyelitis ([address]     Infection of skin     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     Surgical wound dehiscence, initial encounter     S/P amputation of lesser toe, right (HCC)     Rectal bleeding     Athscl native arteries of left leg w ulceration oth prt foot ([facility])     JESICA (obstructive sleep apnea)     Secondary diabetes mellitus ([facility])     Chest pain     Peripheral vascular disease ([facility])     Cellulitis     Syncope and collapse     Nonrheumatic mitral (valve) insufficiency     Ischemic myocardial dysfunction     Pulmonary hypertension (HCC)     Acute on chronic combined systolic and diastolic congestive heart failure (HCC)     Asthma     Acute kidney injury superimposed on CKD (HCC)     Dizziness     Acute cerebrovascular accident ([address]     Abnormal gait     Anxiety     Gastritis, unspecified, without bleeding     Pure hypercholesterolemia     Schizophrenia ([address]     Coronary arteriosclerosis     Extrapyramidal disease     Gangrene of toe (HCC)     Drug-induced hypotension     Osteomyelitis of right foot ([facility])     Nausea and vomiting     Mild intermittent asthma without complication     Heart failure, diastolic, with acute decompensation ([facility])     Major depressive disorder, single episode, unspecified     Type 2 diabetes mellitus with diabetic neuropathy, unspecified ([facility])     Obesity (BMI 30-39. 9)     Disorder of carotid artery ([facility])     NSTEMI (non-ST elevated myocardial infarction) ([address]     Pneumonia CKD (chronic kidney disease) stage 3, GFR 30-59 ml/min ([facility])     Pain in right hand     Anesthesia of skin     Carpal tunnel syndrome     History of angioplasty of peripheral vessel     History of coronary artery bypass surgery     Paresthesia of skin     Acute decompensated heart failure (HCC)     Depression     Abdominal pain     Abdominal pain, right upper quadrant     Shock ([facility]     Gram-positive bacteremia     Acute cholecystitis      Subjective:  Admit Date: 4/22/2022    Interval History:daughter in room  Patient less pain but swelling noted    Medications:  Scheduled Meds:   furosemide  60 mg IntraVENous BID    chlorothiazide (DIURIL) IVPB  500 mg IntraVENous Once    vancomycin  750 mg IntraVENous Q24H    dicyclomine  20 mg IntraMUSCular 4x Daily    miconazole   Topical BID    enoxaparin  30 mg SubCUTAneous BID    metoprolol  5 mg IntraVENous 4 times per day    insulin lispro  0-6 Units SubCUTAneous TID WC    insulin lispro  0-3 Units SubCUTAneous Nightly    [Held by provider] potassium chloride  20 mEq Oral BID WC    sodium chloride flush  5-40 mL IntraVENous 2 times per day    meropenem  1,000 mg IntraVENous Q8H    prazosin  1 mg Oral Nightly    rocuronium  1 mg/kg IntraVENous Once    pantoprazole (PROTONIX) 40 mg injection  40 mg IntraVENous Daily    sennosides-docusate sodium  2 tablet Oral BID    vancomycin (VANCOCIN) intermittent dosing (placeholder)   Other RX Placeholder    dextrose bolus (hypoglycemia)  250 mL IntraVENous Once    sodium chloride flush  5-40 mL IntraVENous 2 times per day    sertraline  200 mg Oral Daily    montelukast  10 mg Oral Nightly    [Held by provider] metoprolol succinate  50 mg Oral BID    levothyroxine  50 mcg Oral Daily    [Held by provider] isosorbide dinitrate  20 mg Oral TID    haloperidol  5 mg Oral Daily    busPIRone  10 mg Oral BID    [Held by provider] atorvastatin  80 mg Oral Nightly    polyvinyl alcohol  1 drop Both Eyes BID    [Held by provider] ranolazine  1,000 mg Oral BID    [Held by provider] meclizine  25 mg Oral BID    [Held by provider] hydrALAZINE  50 mg Oral 3 times per day    insulin glargine  40 Units SubCUTAneous Nightly     Continuous Infusions:   sodium chloride 100 mL/hr at 05/03/22 2124    sodium chloride      sodium chloride 25 mL (04/29/22 1850)    sodium chloride      dextrose         CBC:   Recent Labs     05/03/22  0600 05/04/22  0555   WBC 11.7* 10.8   HGB 8.2* 8.5*    208     CMP:    Recent Labs     05/02/22  0600 05/02/22  0600 05/03/22  0600 05/04/22  0558     --  133* 133*   K 4.8  4.8   < > 4.6  4.6 4.5  4.5     --  101 101   CO2 21  --  20 21   BUN 45*  --  50* 53*   CREATININE 1.81*  --  1.90* 1.82*   GLUCOSE 162*  --  124* 92   CALCIUM 8.5  --  8.3* 8.3*   LABGLOM 28.1*  --  26.6* 27.9*    < > = values in this interval not displayed. Troponin: No results for input(s): TROPONINI in the last 72 hours. BNP: No results for input(s): BNP in the last 72 hours. INR: No results for input(s): INR in the last 72 hours. Lipids: No results for input(s): CHOL, LDLDIRECT, TRIG, HDL, AMYLASE, LIPASE in the last 72 hours. Liver:   Recent Labs     05/04/22  0558   AST 32   ALT 14   ALKPHOS 113   PROT 6.1*   LABALBU 3.0*   BILITOT 0.6     Iron:  No results for input(s): IRONS, FERRITIN in the last 72 hours. Invalid input(s): LABIRONS  Urinalysis: No results for input(s): UA in the last 72 hours.     Objective:  Vitals: BP (!) 137/54   Pulse 62   Temp 97.3 °F (36.3 °C) (Oral)   Resp 18   Ht 5' 4\" (1.626 m)   Wt 261 lb (118.4 kg)   SpO2 99%   BMI 44.80 kg/m²    Wt Readings from Last 3 Encounters:   05/04/22 261 lb (118.4 kg)   04/20/22 225 lb (102.1 kg)   04/11/22 226 lb (102.5 kg)      24HR INTAKE/OUTPUT:      Intake/Output Summary (Last 24 hours) at 5/4/2022 0818  Last data filed at [facility] 2050  Gross per 24 hour   Intake 1241.64 ml   Output 500 ml   Net 741.64 ml       General: alert, in no apparent distress  HEENT: normocephalic, atraumatic, anicteric  Neck: supple, no mass  Lungs: non-labored respirations, clear to auscultation bilaterally  Heart: regular rate and rhythm, no murmurs or rubs  Abdomen: soft, non-tender, non-distended  Ext: no cyanosis, 2+ peripheral edema limbs  Neuro: alert and oriented, no gross abnormalities  Psych: normal mood and affect  Skin: no rash      Electronically signed by Pili Lugo DO, MD

## 2022-05-04 NOTE — PROGRESS NOTES
Infectious Diseases Inpatient Progress Note          HISTORY OF PRESENT ILLNESS:  Follow up acute cholecystitis status postcholecystectomy, coag negative staph bacteremia, possible contamination versus line infection status post CVP removal, new PICC line placed 2 days ago on IV vancomycin and meropenem, well tolerated. Patient reports severe right upper quadrant abdominal pain. Currently transferred to telemetry floor and is more awake and oriented. Had severe constipation but is currently being treated. Had bowel movements. Has poor appetite.   No nausea or vomiting  Repeat blood cultures are negative so far  No fevers  decreasing leukocytosis  Current Medications:     carvedilol  25 mg Oral BID WC    aspirin  81 mg Oral Daily    ticagrelor  90 mg Oral BID    furosemide  40 mg Oral Daily    vancomycin  750 mg IntraVENous Q24H    miconazole   Topical BID    enoxaparin  30 mg SubCUTAneous BID    insulin lispro  0-6 Units SubCUTAneous TID WC    insulin lispro  0-3 Units SubCUTAneous Nightly    potassium chloride  20 mEq Oral BID WC    sodium chloride flush  5-40 mL IntraVENous 2 times per day    meropenem  1,000 mg IntraVENous Q8H    prazosin  1 mg Oral Nightly    rocuronium  1 mg/kg IntraVENous Once    pantoprazole (PROTONIX) 40 mg injection  40 mg IntraVENous Daily    sennosides-docusate sodium  2 tablet Oral BID    vancomycin (VANCOCIN) intermittent dosing (placeholder)   Other RX Placeholder    dextrose bolus (hypoglycemia)  250 mL IntraVENous Once    sodium chloride flush  5-40 mL IntraVENous 2 times per day    sertraline  200 mg Oral Daily    montelukast  10 mg Oral Nightly    levothyroxine  50 mcg Oral Daily    isosorbide dinitrate  20 mg Oral TID    haloperidol  5 mg Oral Daily    busPIRone  10 mg Oral BID    atorvastatin  80 mg Oral Nightly    polyvinyl alcohol  1 drop Both Eyes BID    ranolazine  1,000 mg Oral BID    [Held by provider] meclizine  25 mg Oral BID    insulin glargine  40 Units SubCUTAneous Nightly       Allergies:  Ambien [zolpidem tartrate], Capoten [captopril], Clioquinol, Cogentin [benztropine], Depakote [divalproex sodium], Effexor xr [venlafaxine hcl er], Geodon [ziprasidone hcl], Lisinopril, Lyrica [pregabalin], Navane [thiothixene], Pamelor [nortriptyline hcl], Remeron [mirtazapine], Risperdal [risperidone], Trazodone and nefazodone, and Wellbutrin [bupropion]      Review of Systems  14 system review is negative other than HPI, decreased abdominal pain after abdominal drain was removed  Continues to improve, no SOB or cough  No abdominal pain  Weak all over    Physical Exam  Vitals:    05/04/22 0014 05/04/22 0536 05/04/22 0639 05/04/22 0641   BP: (!) 109/59 (!) 127/92  (!) 137/54   Pulse: 73 73  62   Resp:    18   Temp:    97.3 °F (36.3 °C)   TempSrc:    Oral   SpO2: 96% 94%  99%   Weight:   261 lb (118.4 kg)    Height:         General Appearance: alert and oriented x2, well-developed and well-nourished, in no acute distress, on 2 L nasal cannula. Skin: warm and dry, no rash. Head: normocephalic and atraumatic  Eyes: anicteric sclerae  ENT: oropharynx clear and moist with normal mucous membranes.  No oral thrush  Lungs: normal respiratory effort, clear lungs  Heart normal S1-S2 no murmur  Abdomen: soft, right upper quadrant mild tenderness S/P drain removal with serosanguineous drainage on dressing, +ve bowel sounds  Intact right upper extremity PICC line  No erythema, no tenderness  Bilateral decreasing upper extremity swelling and edema  trace leg edema  Small areas of ecchymosis  Follows simple commands appropriately  Colon catheter with clear urine    DATA:    Lab Results   Component Value Date    WBC 10.8 05/04/2022    HGB 8.5 (L) 05/04/2022    HCT 25.9 (L) 05/04/2022    MCV 75.1 (L) 05/04/2022     05/04/2022     Lab Results   Component Value Date    CREATININE 1.82 (H) 05/04/2022    BUN 53 (H) 05/04/2022     (L) 05/04/2022    K 4.5 05/04/2022    K 4.5 05/04/2022     05/04/2022    CO2 21 05/04/2022       Hepatic Function Panel:  Lab Results   Component Value Date    ALKPHOS 113 05/04/2022    ALT 14 05/04/2022    AST 32 05/04/2022    PROT 6.1 05/04/2022    BILITOT 0.6 05/04/2022    BILIDIR <0.2 03/09/2022    IBILI see below 03/09/2022    LABALBU 3.0 05/04/2022    LABALBU <7.0 01/06/2020       Microbiology:   No results for input(s): BC in the last 72 hours. No results for input(s): Rachel Canter in the last 72 hours. KUB done yesterday     Impression   There are no acute intra-abdominal changes. There is a surgical drain in the right upper quadrant and there are surgical skin staples at several locations in the anterior abdomen     Susceptibility      Staphylococcus epidermidis (2)    Antibiotic Interpretation Microscan  Method Status    benzylpenicillin Resistant >=0.5 mcg/mL BACTERIAL SUSCEPTIBILITY PANEL BY ROBEL     clindamycin Sensitive <=0.25 mcg/mL BACTERIAL SUSCEPTIBILITY PANEL BY ROBEL     erythromycin Resistant >=8 mcg/mL BACTERIAL SUSCEPTIBILITY PANEL BY ROBEL     gentamicin Sensitive <=0.5 mcg/mL BACTERIAL SUSCEPTIBILITY PANEL BY ROBEL      Gentamicin is used only in combination with other active agents that   test susceptible. inducible clindamycin resistance Sensitive NEGATIVE mcg/mL BACTERIAL SUSCEPTIBILITY PANEL BY ROBEL     oxacillin Resistant >=4 mcg/mL BACTERIAL SUSCEPTIBILITY PANEL BY ROBEL     tetracycline Resistant >=16 mcg/mL BACTERIAL SUSCEPTIBILITY PANEL BY ROBEL     trimethoprim-sulfamethoxazole Sensitive 20 mcg/mL BACTERIAL SUSCEPTIBILITY PANEL BY ROBEL     vancomycin Sensitive 1 mcg/mL BACTERIAL SUSCEPTIBILITY PANEL BY ROBEL            Impression:       1. Fluid collection is seen over the lower lateral aspect of the chest and also abdomen adjacent to the muscle. This could represent abscess. No contrast was administered. There also is infiltration of the fat anterior to the fluid collection.        2. No evidence for appendicitis, diverticulitis or obstruction   3. Patient is status post cholecystectomy and drains are seen in place on the right. All CT scans at this facility use dose modulation, iterative reconstruction, and/or weight based dosing when appropriate to reduce radiation dose to as low as reasonably    achievable. IMPRESSION:    · Septic versus cardiogenic shock   · Coag negative staph (MRSE) bacteremia  · Acute cholecystitis status post laparoscopic cholecystectomy  · ?  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    Surgical wound dehiscence, initial encounter    S/P amputation of lesser toe, right (HCC)    Rectal bleeding    Athscl native arteries of left leg w ulceration oth prt foot ([address]    JESICA (obstructive sleep apnea)    Secondary diabetes mellitus ([address]    Chest pain    Peripheral vascular disease ([address]    Cellulitis    Syncope and collapse    Nonrheumatic mitral (valve) insufficiency    Ischemic myocardial dysfunction    Pulmonary hypertension (HCC)    Acute on chronic combined systolic and diastolic congestive heart failure (HCC)    Asthma    Acute kidney injury superimposed on CKD (HCC)    Dizziness    Acute cerebrovascular accident ([address]    Abnormal gait    Anxiety    Gastritis, unspecified, without bleeding    Pure hypercholesterolemia    Schizophrenia ([address]    Coronary arteriosclerosis    Extrapyramidal disease    [facility]    Drug-induced hypotension    Osteomyelitis of right foot ([facility])    Nausea and vomiting    Mild intermittent asthma without complication    Heart failure, diastolic, with acute decompensation ([facility])    Major depressive disorder, single episode, unspecified    Type 2 diabetes mellitus with diabetic neuropathy, unspecified ([facility])    Obesity (BMI 30-39. 9)    Disorder of carotid artery (HCC)    NSTEMI (non-ST elevated myocardial infarction) ([address]    Pneumonia    CKD (chronic kidney disease) stage 3, GFR 30-59 ml/min ([facility])    Pain in right hand    Anesthesia of skin    Carpal tunnel syndrome    History of angioplasty of peripheral vessel    History of coronary artery bypass surgery    Paresthesia of skin    Acute decompensated heart failure (HCC)    Depression    Abdominal pain    Abdominal pain, right upper quadrant    Shock ([address]    Gram-positive bacteremia    Acute cholecystitis       PLAN:  · discontinue IV vancomycin and meropenem   · May discharge to NH off antibiotics  · D/C PICC  · F/U PRN    Discussed with RN, patient and adult child    Naseem Marcelo MD

## 2022-05-04 NOTE — DISCHARGE INSTR - DIET
Good nutrition is important when healing from an illness, injury, or surgery. Follow any nutrition recommendations given to you during your hospital stay. If you were given an oral nutrition supplement while in the hospital, continue to take this supplement at home. You can take it with meals, in-between meals, and/or before bedtime. These supplements can be purchased at most local grocery stores, pharmacies, and chain [facility]-stores. If you have any questions about your diet or nutrition, call the hospital and ask for the dietitian. Adult diet.  Soft and bite sized ADA diabetic\  Adult oral nutrition supplement TID

## 2022-05-04 NOTE — DISCHARGE SUMMARY
Discharge Summary    Date: 5/4/2022  Patient Name: Gabe Cabrera YOB: 1955 Age: 61 y.o. Admit Date: 4/22/2022  Discharge Date: 5/4/2022  Discharge Condition: [address]    Admission Diagnosis  Abdominal pain, right upper quadrant (R10.11); Abdominal pain (R10.9); Intractable vomiting with nausea, unspecified vomiting type (R11.2)     Discharge Diagnosis  Principal Problem: Abdominal painActive Problems: Abdominal pain, right upper quadrant Shock ([address] Gram-positive bacteremia Acute cholecystitis Acute kidney injury superimposed on CKD (HCC)Resolved Problems: * No resolved hospital problems. [facility] Stay  Narrative of Hospital Course:  Patient is a 62-year-old female admitted 4/22/2022 in setting of severe abdominal pain found to be secondary to acute cholecystitis, who underwent laparoscopic cholecystectomy on 4/25/2022. She is additionally found to have CKD 3, diabetes, RADHA on CKD, hyperkalemia, shock physiology present on admission with associated bacteremia. Multiple teams were involved in her care during her admission, including hospitalist service, cardiology, infectious disease service, critical care, nephrology, urology, endocrinology. She was deemed sufficiently improved and appropriate for discharge to SNF for further therapy and cares as of 5/4/2022. She will need outpatient follow-up with PCP, surgery, cardiology, nephrology, urology, endocrinology, and psychiatry services.     Consultants:  IP CONSULT TO SPIRITUAL SERVICESIP CONSULT TO GENERAL SURGERYIP CONSULT TO NEPHROLOGYIP CONSULT TO [address] [place] [address] Dr CONSULT TO PSYCHIATRYIP CONSULT TO CRITICAL CAREIP CONSULT TO CARDIOLOGYIP CONSULT TO CARDIOLOGYIP CONSULT TO [address] CONSULT TO INFECTIOUS DISEASESIP CONSULT TO PALLIATIVE CAREIP CONSULT TO COLORECTAL SURGERY    Surgeries/procedures Performed:       Treatments:    Analgesia, Antibiotics, Anticoagulation, Insulin, Therapies, Surgery, Procedures, Cardiac Medications and IV Hydration        Discharge Plan/Disposition:  To [facility]/Incidental Findings Requiring Follow Up:    Patient Instructions:    Diet:    Activity:  For number of days (if applicable): Other Instructions:    Provider Follow-Up:   No follow-ups on file. Significant Diagnostic Studies:    Recent Labs:  Admission on 04/22/2022No results displayed because visit has over 200 results. ------------    Radiology last 7 days:  CT ABDOMEN PELVIS WO CONTRAST Additional Contrast? Radiologist RecommendationResult Date: 5/2/20221. Fluid collection is seen over the lower lateral aspect of the chest and also abdomen adjacent to the muscle. This could represent abscess. No contrast was administered. There also is infiltration of the fat anterior to the fluid collection. 2. No evidence for appendicitis, diverticulitis or obstruction 3. Patient is status post cholecystectomy and drains are seen in place on the right. All CT scans at this facility use dose modulation, iterative reconstruction, and/or weight based dosing when appropriate to reduce radiation dose to as low as reasonably  achievable. XR CHEST (2 VW)Result Date: 4/28/2022Interval development of right lung infiltrate and/or atelectasis. Otherwise no significant interval change. XR ABDOMEN (KUB) (SINGLE AP VIEW)Result Date: 4/30/2022There are no acute intra-abdominal changes. There is a surgical drain in the right upper quadrant and there are surgical skin staples at several locations in the anterior abdomen. CTA CHEST W WO CONTRASTResult Date: 4/30/2022Negative CTA of the chest. No evidence of thoracic aortic dissection or aneurysm. The study is negative for pulmonary emboli. There are nonspecific groundglass opacities in both lungs which may secondary to inflammation, infection. There are shotty mediastinal lymphadenopathy. NM LUNG SCAN PERFUSION ONLYResult Date: 4/28/2022ABNORMAL PERFUSION SCINTIGRAPHY WITH BILATERAL PERFUSION DEFECTS.  THEREFORE, THE STUDY IS INDETERMINATE FOR ASSESSMENT OF ACUTE PULMONARY EMBOLUS     Pending Labs   Order Current Status  Blood Gas, Arterial Collected (04/28/22 0003)  Blood gas, arterial Collected (04/28/22 0451)  POCT EPOC BLOOD GAS, LACTIC ACID, ICA Collected (04/27/22 0516)  Surgical Pathology Collected (04/25/22 1312)  Culture, Blood 1 Preliminary result  Culture, Blood 2 Preliminary result      Discharge Medications    Current Discharge Medication ListSTART taking these medicationscarvedilol (COREG) 25 MG tabletTake 1 tablet by mouth 2 times daily (with meals)Qty: 60 tablet Refills: 3potassium chloride (KLOR-CON M) 20 MEQ extended release tabletTake 1 tablet by mouth dailyQty: 60 tablet Refills: 3    Current Discharge Medication ListCONTINUE these medications which have CHANGEDhydrALAZINE (APRESOLINE) 50 MG tabletTake 1 tablet by mouth every 8 hours Hold if SBP < 120Qty: 90 tablet Refills: 3    Current Discharge Medication ListCONTINUE these medications which have NOT CHANGEDdicyclomine (BENTYL) 10 MG capsuleTake 1 capsule by mouth every 6 hours as needed (cramps)Qty: 20 capsule Refills: 0ondansetron (ZOFRAN ODT) 4 MG disintegrating tabletTake 1 tablet by mouth every 8 hours as needed for NauseaQty: 20 tablet Refills: 0albuterol sulfate HFA (VENTOLIN HFA) 108 (90 Base) MCG/ACT inhalerInhale 2 puffs into the lungs as needed for Wheezing Every 4-6 hours PRNQty: 1 each Refills: 3Associated Diagnoses:Mild intermittent asthma without complicationalbuterol (PROVENTIL) (2.5 MG/3ML) 0.083% nebulizer solutionTake 3 mLs by nebulization every 6 hours as needed for HE[facility] OF THE [facility]: 120 each Refills: 3Associated Diagnoses:Mild intermittent asthma without complicationmontelukast (SINGULAIR) 10 MG tabletTake 1 tablet by mouth nightlyQty: 30 tablet Refills: 3Associated Diagnoses:Mild intermittent asthma without complication!! Continuous Blood Gluc Sensor (FREESTYLE FELY 14 DAY SENSOR) MISCEvery 2 weeksQty: 2 each Refills: 06insulin glargine (LANTUS SOLOSTAR) 100 UNIT/ML injection pen60 units at bedtimeQty: 15 pen Refills: 3Associated Diagnoses:Type 2 diabetes mellitus with hyperglycemia, with long-term current use of insulin ([facility])insulin lispro, 1 Unit Dial, (HUMALOG KWIKPEN) 100 UNIT/ML SOPN15  units at each mealsQty: 10 pen Refills: 03Associated Diagnoses:Type 2 diabetes mellitus with hyperglycemia, with long-term current use of insulin ([facility])Dulaglutide (TRULICITY) 1.33 XH/7.6NN SOPNInject 0.75 mg into the skin once a weekQty: 4 pen Refills: 3Associated Diagnoses:Type 2 diabetes mellitus with hyperglycemia, with long-term current use of insulin ([facility])levothyroxine (SYNTHROID) 50 MCG tablettake 1 tablet by mouth once dailyQty: 30 tablet Refills: 5Associated Diagnoses:Hypothyroidism, unspecified typeoxybutynin (DITROPAN-XL) 5 MG extended release tablettake 1 tablet by mouth once dailyQty: 90 tablet Refills: 3busPIRone (BUSPAR) 10 MG tablet!! doxepin (SINEQUAN) 100 MG capsuleprazosin (MINIPRESS) 2 MG capsuleAlcohol Swabs (ALCOHOL PREP) 70 % PADSqidQty: 300 each Refills: 06Associated Diagnoses:Type 2 diabetes mellitus with hyperglycemia, with long-term current use of insulin ([facility])blood glucose test strips (FREESTYLE LITE) strip1 each by In Vitro route daily As needed. Qty: 100 each Refills: 3Associated Diagnoses:Type 2 diabetes mellitus with hyperglycemia, with long-term current use of insulin ([address]! !  Continuous Blood Gluc Sensor (FREESTYLE FELY 14 DAY SENSOR) MISCEvery 2 weeksQty: 2 each Refills: 06Associated Diagnoses:Type 2 diabetes mellitus with hyperglycemia, with long-term current use of insulin ([facility])Continuous Blood Gluc  (FREESTYLE FELY 14 DAY READER) DEVIAs directedQty: 2 each Refills: 3Associated Diagnoses:Type 2 diabetes mellitus with hyperglycemia, with long-term current use of insulin ([facility])FreeStyle Lancets MISC1 each by Does not apply route dailyQty: 100 each Refills: 3Associated Diagnoses:Type 2 diabetes mellitus with hyperglycemia, with long-term current use of insulin ([address]! ! Insulin Pen Needle ([employer] PEN NEEDLES 31G) 31G X 8 MM MISC1 each by Does not apply route 4 times dailyQty: 300 each Refills: 3Associated Diagnoses:Type 2 diabetes mellitus with hyperglycemia, with long-term current use of insulin (HCC)oxyCODONE-acetaminophen (PERCOCET) 5-325 MG per tabletTake 1 tablet by mouth every 4 hours as needed for Pain.!! Insulin Pen Needle (NOVOFINE) 32G X 6 MM MISCqidQty: 300 each Refills: 3ammonium lactate (LAC-HYDRIN) 12 % lotionatorvastatin (LIPITOR) 80 MG tablettake 1 tablet by mouth at bedtimediclofenac sodium (VOLTAREN) 1 % GELapply 4 grams to affected area three times a day AS NEEDED FOR PAIN!! doxepin (SINEQUAN) 25 MG ucvbile48 mg nightly ranolazine (RANEXA) 1000 MG extended release tabletTake 1 tablet by mouth 2 times dailyQty: 60 tablet Refills: 3gabapentin (NEURONTIN) 600 MG tabletTake 600 mg by mouth 2 times daily. RESTASIS 0.05 % ophthalmic emulsionneomycin-polymyxin-dexameth (MAXITROL) 3.5-56768-2.1 ophthalmic suspensioninstill 1 drop into both eyes four times a dayARTIFICIAL TEARS 1.4 % ophthalmic solutiondocusate sodium (COLACE, DULCOLAX) 100 MG CAPSTake 100 mg by mouth 2 times dailyQty: 60 capsule Refills: 1sertraline (ZOLOFT) 100 MG tabletTake 200 mg by mouth daily furosemide (LASIX) 40 MG tabletTake 1 tablet by mouth dailyQty: 60 tablet Refills: 3ticagrelor (BRILINTA) 90 MG TABS tabletTake 90 mg by mouth 2 times dailyCPAP Machine MISCby Does not apply route New CPAP with 10 cmQty: 1 each Refills: 0Associated Diagnoses:JESICA (obstructive sleep apnea)aspirin 81 MG EC tabletTake 1 tablet by mouth dailyQty: 30 tablet Refills: 0GDURAB6 lit O2 with sleep , please give O2 concentratorQty: 1 Units Refills: 0Emollient (EUCERIN INTENSIVE REPAIR HAND) 2.5-10 % CREAAPPLY TO FEET AT BEDTIME; PLACE SOCKS OVER FEET AFTER APPLICATION IF NEEDED FOR DRY CRACKING FEEThydrOXYzine (VISTARIL) 25 MG capsuleTake 50 mg by mouth 3 times daily as needed Nutritional Supplements ([address]) LIQDtake as directed three times a dayisosorbide dinitrate (ISORDIL) 20 MG tabletTake 1 tablet by mouth 3 times dailyQty: 90 tablet Refills: 3nitroGLYCERIN (NITROSTAT) 0.4 MG SL tabletup to max of 3 total doses. If no relief after 1 dose, call 911. Qty: 25 tablet Refills: 3haloperidol (HALDOL) 5 MG tabletTake 5 mg by mouth dailyfolic acid (FOLVITE) 1 MG tabletTake 1 mg by mouth dailyIron Polysacch Iktxi-H87-AT (NIFEREX-150 FORTE PO)Take 1 tablet by mouth daily Cyanocobalamin (VITAMIN B-12) 1000 MCG extended release tabletTake 1,000 mcg by mouth dailymeclizine (ANTIVERT) 25 MG tabletTake 25 mg by mouth 2 times daily ! ! - Potential duplicate medications found. Please discuss with provider. Current Discharge Medication ListSTOP taking these medicationsmetoprolol succinate (TOPROL XL) 50 MG extended release tabletComments:Reason for Stopping:    Time Spent on Discharge:4E] minutes were spent in patient examination, evaluation, counseling as well as medication reconciliation, prescriptions for required medications, discharge plan, and follow up.     Electronically signed by Elza Soto DO on 5/4/22 at 2:59 PM EDT

## 2022-05-04 NOTE — PROGRESS NOTES
Progress Note  Date:2022       [facility]:[id number]  Patient Christina Sigala     YOB: 1955     Age:66 y.o. Subjective    Patient reports no nausea, vomiting, or any significant abdominal pain this morning, significantly improved from prior. Palliative care service made adjustments to pain regimen  to good effect. No other new/acute complaints at present. Objective         Vitals Last 24 Hours:  TEMPERATURE:  Temp  Av.8 °F (36.6 °C)  Min: 97.3 °F (36.3 °C)  Max: 98.2 °F (36.8 °C)  RESPIRATIONS RANGE: Resp  Av  Min: 16  Max: 20  PULSE OXIMETRY RANGE: SpO2  Av.9 %  Min: 94 %  Max: 100 %  PULSE RANGE: Pulse  Av.1  Min: 62  Max: 83  BLOOD PRESSURE RANGE: Systolic (90HAA), EAI:495 , Min:109 , TIW:781   ; Diastolic (88VWZ), LTB:29, Min:54, Max:127    I/O (24Hr): Intake/Output Summary (Last 24 hours) at 2022 0826  Last data filed at 2022 8259  Gross per 24 hour   Intake 1241.64 ml   Output 500 ml   Net 741.64 ml     Physical Exam:  Constitutional: Obese  female reclining in bed in [facility]. Cherl Spinner Family present at bedside. Head: NCAT  Eyes: PERRLA  Cardiovascular: RRR. No significant murmurs appreciated. RUE PICC line in place. Pulmonary: Normal rate and effort respiration on O2 by NC  Abdomen: Soft, obese, nontense abdomen. Nontympanic. Decreased generalized tenderness. Neurologic: Alert, grossly oriented. .  No focal neurologic deficits appreciated. Psychiatric: Pleasant, calm, cooperative.       Labs/Imaging/Diagnostics    Labs:  CBC:  Recent Labs     22  0600 22  0600 22  0555   WBC 11.8* 11.7* 10.8   RBC 3.27* 3.29* 3.45*   HGB 8.2* 8.2* 8.5*   HCT 24.6* 24.7* 25.9*   MCV 75.2* 75.0* 75.1*   RDW 19.2* 19.5* 19.4*    154 208     CHEMISTRIES:  Recent Labs     22  0600 22  0600 22  0600 22  0558     --  133* 133*   K 4.8  4.8   < > 4.6  4.6 4.5  4.5     --  101 101   CO2 21  --  20 21   BUN 45*  --  50* 53*   CREATININE 1.81*  --  1.90* 1.82*   GLUCOSE 162*  --  124* 92   PHOS 3.5  --  3.4 3.3   MG  --   --  2.1 2.1    < > = values in this interval not displayed. PT/INR:  No results for input(s): PROTIME, INR in the last 72 hours. APTT:  No results for input(s): APTT in the last 72 hours. LIVER PROFILE:  Recent Labs     05/02/22  0600 05/03/22  0600 05/04/22  0558   AST 39* 32 32   ALT 17 15 14   BILITOT 0.6 0.5 0.6   ALKPHOS 110 104 113       Imaging Last 24 Hours:  CT ABDOMEN PELVIS WO CONTRAST Additional Contrast? None    Result Date: 4/23/2022  EXAM:  CT ABDOMEN PELVIS WO CONTRAST History: Abdominal pain Technique: Multiple contiguous axial images were obtained of the abdomen and pelvis from the level of the lung bases through the ischial tuberosities without contrast. Multiplanar reformats were obtained. Comparison: CT abdomen pelvis April 20, 2022 Findings: Lung bases are clear. Heart size is enlarged. Mitral annular calcification. Evidence of prior thoracic surgery. Lack of intravenous contrast precludes optimal evaluation of the abdominal and pelvic viscera. The unenhanced liver, spleen, stomach, pancreas, and adrenal glands appear within normal limits. The gallbladder is mildly distended but not significant changed from recent CT. There are debris is present within the gallbladder lumen. No gallbladder wall thickening or pericholecystic fluid identified by CT. Mild bilateral perinephric stranding. No urinary tract calculi or hydronephrosis. The urinary bladder is decompressed. The uterus is absent. Abdominal aorta is nonaneurysmal  . No retroperitoneal or abdominal/pelvic lymphadenopathy. No small bowel obstruction. No overt colonic mass or pericolonic inflammation. No findings of acute appendicitis No free fluid, loculated fluid collection, or pneumoperitoneum. No acute osseous abnormality.      Mildly distended gallbladder containing gallbladder sludge and/or tiny gallstones without significant interval change since April 20, 2022 CT. No CT findings of acute cholecystitis. Mild bilateral perinephric stranding is nonspecific. Correlation for polynephritis is recommended. All CT scans at this facility use dose modulation, iterative reconstruction, and/or weight based dosing when appropriate to reduce radiation dose to as low as reasonably achievable. US ABDOMEN LIMITED    Result Date: 4/23/2022  EXAM: US ABDOMEN LIMITED HISTORY: Right upper quadrant pain TECHNIQUE: Ultrasound evaluation was performed of the right upper quadrant of the abdomen. COMPARISON: CT abdomen pelvis April 23, 2022 FINDINGS: Normal echogenicity and contour of the liver. No liver lesion or intrahepatic biliary dilatation. Main portal vein is patent. The gallbladder is mildly distended. Layering echogenic material is identified within the lumen of the gallbladder. No pericholecystic fluid. Gallbladder wall thickness is at the upper limits of normal measuring 2.7 mm. Negative Velez sign reported. Common bile duct is normal in diameter measuring 4 mm. No overt abnormality of the pancreas. Nonspecific findings of the gallbladder including mildly distended gallbladder containing layering sludge and/or tiny gallstones with wall thickness of the upper limits of normal measuring 2.7 mm. No pericholecystic fluid to suggest acute cholecystitis.      Assessment//Plan           Hospital Problems           Last Modified POA    * (Principal) Abdominal pain 4/23/2022 Yes    Abdominal pain, right upper quadrant 4/25/2022 Yes    Shock ([address] 4/29/2022 Yes    Gram-positive bacteremia 4/29/2022 Yes    Acute cholecystitis 4/29/2022 Yes    Acute kidney injury superimposed on CKD ([address] 4/29/2022 Yes            Assessment & Plan    Assessment:  · Acute cholecystitis, s/p Lap Teri 4/25  · Severe abdominal pain in setting of above, persistent post-operatively  · CKD 3  · Diabetes  · CAD  · RADHA on CKD  · Hyperkalemia  · Shock - resolved  · Gram positive septicemia  · Sepsis    Updates to Plan:  4/24: Tegretol and aspirin for now as per surgery recommendation. For surgery for tomorrow. Sugars better controlled. She has right upper quadrant pain. Possible cholecystectomy tomorrow. 4/25: Patient is going for lap heather today. Anticipate discharge tomorrow. No overnight events no new complaints. Continue current care.hold nephrotoxic agents, cw IVF.  4/26: Acute urinary retention s/p Colon, urology evaluation, renal function is worsening we will get nephrology evaluation, treat hyperkalemia. Spoke with nursing. 4/27: He upon entering room patient is lethargic, unable to arouse. Spoke with the  at bedside. Blood pressure is low, used  to speak with them. Transfer patient to ICU for hypotension/shock Patient was started on Flomax yesterday. We will get psych eval for polypharmacy. Patient mental status and condition is to be changed from yesterday. Spoke with cardiac critical care team about the care plan  CC time was 45 min  4/28: Spoke with the daughter-in-law, patient intubated and is on pressors. Taper down pressors as tolerated. Patient is on heparin drip for possible PE, renal function is improving. Questionable complete heart block. Follow cardiology, continue IV antibiotic for gram-positive septicemia with multiorgan failure,  4/29: Patient was extubated, spoke with the daughter at bedside. Continue current care. Patient is more stable compared to before. Follow-up ID continue with IV antibiotics, family would like SNF placement. PT OT ordered. 4/30: Patient complains of constipation, order lactulose, abdominal pain KUB. CT angio to rule out PE , IF PE is negative. DC heparin drip. Spoke with nursing about the care. 5/1: Stool for occult blood is negative. CT chest is negative for PE. Continue with DVT prophylaxis. Palliative for pain management. Continue with current care.  No overnight events. Episode of nausea and vomiting today. Abdomen soft, nontender, + BS,   5/2: Repeat CT abdomen without contrast (in setting of RADHA) demonstrating focal findings concerning for postoperative abscess versus hematoma of anterior abdominal wall. Colorectal surgery reconsulted, reviewed images, feels most consistent with postoperative intramuscular small hematomas, with no indication for surgical intervention. They further report that patient clinically appears unchanged from prior to cholecystectomy surgery, at which time she endorsed identical severe abdominal pain and nausea of unclear etiology. Palliative care service consulted for pain management, adjusted pain control regimen with some improvement. Patient reportedly chronically taking Percocet 5/2: every 4 hours as needed while at home. Consider GI consult for possible endoscopy if no symptom improvement by next day. 5/3: Drain removed in interval.  Surgery reviewed CAT scan images, felt not actually an abscess, possible intramuscular postoperative anesthetic block changes not requiring any surgical intervention, not acutely dangerous. Palliative care modified pain regimen on 5/2, pain significantly better controlled today. Decreased nausea/vomiting today. Trending improvement overall today. 5/4: Nephrology increasing diuretics, adding Coreg. Pulmonology continues to follow, encourage CPAP use but patient still refusing. Infectious disease service has patient remaining on vancomycin and meropenem \"a few more days\" for coag negative staph bacteremia (versus contamination). Patient accepted to SNF ([facility]), pending ID recommendations regarding ongoing IV antibiotic therapy.       Yordy Zacarias,

## 2022-05-04 NOTE — PROGRESS NOTES
CARDIOLOGY [facility] PROGRESS NOTE         5/4/2022      Richie Chen    [id number]  1955    Rounding MD: Ubaldo Flores MD ,[facility] [place] [facility]    Primary Cardiologist: Henri Asif MD [facility]      SUBJECTIVE:    Looks better overall. CV stable. Has abdominal discomfort, but improved. No CP. Some hand edema. BCs Positive, ID on case. Renal function stable. Primary to addressing. Family present. DC N Home planning in Progress. Cardiac and general ROS otherwise negative and unchanged. ASSESSMENT:    1. Post recent Cholecystectomy  2. MS Changes / Encephalopathy  3. Shock, Septic probably primary, cardiogenic secondarily  4. CHB, Transient, now resolved. 5. NSTEMI  6. PHTN with RVE , RVSP 60  7. VHD, MR and TR, Moderate  8. CKD  9. Chest pain Hx, reproducible, musculoskeletal, noncardiac  10. CHF, Systolic, acute on chronic  11. CAD post CABG June 2019 [facility], [place] to LAD, PCI to left circumflex and right coronary artery, last April 2020. Post catheterization April 2021, medical treatment advised. 12. Negative Myoview perfusion stress test, 8/2021. 13. Prior Ischemic cardiomyopathy with apical hypokinesia LVEF 35 to 40%. 14. PVOD post right lower extremity revascularization,  and [facility] Dr Sandhu Sample, (details not available), Right third toe amputation site infection. 15. Carotid artery disease with ultrasound noted 50-69% bilateral stenoses May 2019. 16. Hypothyroidism  17. Hypertension  18. Hyperlipidemia  19. Diabetes  20. Renal Insufficiency  21. Depression  22. Schizophrenia  23. Family history of coronary artery disease  24. Multiple allergies as noted. PLAN:    1. Supportive Cardiac Care  2. IV Antibiotics. ID care  3. Post Operative Surgical and medical care. 4. IV medications otherwise as tolerated, including IV metoprolol, advance to home PO medications once able. 5. Primary to address renal function, IVFs etc. ( ? Decrease IVFs )  6.  OK to DC home from CV point  7. Follow up [facility] Murphy as scheduled.   8. See orders      OBJECTIVE:     MEDICATIONS:     Scheduled Meds:   furosemide  60 mg IntraVENous BID    chlorothiazide (DIURIL) IVPB  500 mg IntraVENous Once    carvedilol  6.25 mg Oral BID WC    dicyclomine  20 mg Oral 4x Daily AC & HS    vancomycin  750 mg IntraVENous Q24H    miconazole   Topical BID    enoxaparin  30 mg SubCUTAneous BID    metoprolol  5 mg IntraVENous 4 times per day    insulin lispro  0-6 Units SubCUTAneous TID WC    insulin lispro  0-3 Units SubCUTAneous Nightly    [Held by provider] potassium chloride  20 mEq Oral BID WC    sodium chloride flush  5-40 mL IntraVENous 2 times per day    meropenem  1,000 mg IntraVENous Q8H    prazosin  1 mg Oral Nightly    rocuronium  1 mg/kg IntraVENous Once    pantoprazole (PROTONIX) 40 mg injection  40 mg IntraVENous Daily    sennosides-docusate sodium  2 tablet Oral BID    vancomycin (VANCOCIN) intermittent dosing (placeholder)   Other RX Placeholder    dextrose bolus (hypoglycemia)  250 mL IntraVENous Once    sodium chloride flush  5-40 mL IntraVENous 2 times per day    sertraline  200 mg Oral Daily    montelukast  10 mg Oral Nightly    [Held by provider] metoprolol succinate  50 mg Oral BID    levothyroxine  50 mcg Oral Daily    [Held by provider] isosorbide dinitrate  20 mg Oral TID    haloperidol  5 mg Oral Daily    busPIRone  10 mg Oral BID    [Held by provider] atorvastatin  80 mg Oral Nightly    polyvinyl alcohol  1 drop Both Eyes BID    [Held by provider] ranolazine  1,000 mg Oral BID    [Held by provider] meclizine  25 mg Oral BID    [Held by provider] hydrALAZINE  50 mg Oral 3 times per day    insulin glargine  40 Units SubCUTAneous Nightly     Continuous Infusions:   sodium chloride 100 mL/hr at 05/04/22 0917    sodium chloride      sodium chloride 25 mL (04/29/22 1850)    sodium chloride      dextrose       PRN Meds:HYDROmorphone, dextrose bolus (hypoglycemia) **OR** dextrose bolus (hypoglycemia), glucose, bisacodyl, sodium chloride flush, sodium chloride, atropine, sodium chloride, sodium chloride flush, sodium chloride, ondansetron **OR** ondansetron, polyethylene glycol, acetaminophen **OR** acetaminophen, albuterol, albuterol sulfate HFA, glucagon (rDNA), dextrose    PHYSICAL EXAM:    CURRENT VITALS: BP (!) 137/54   Pulse 62   Temp 97.3 °F (36.3 °C) (Oral)   Resp 18   Ht 5' 4\" (1.626 m)   Wt 261 lb (118.4 kg)   SpO2 99%   BMI 44.80 kg/m²     CONSTITUTIONAL:  awake, alert, cooperative, no apparent distress,   ENT:  Normocephalic, without obvious abnormality, atraumatic, sinuses nontender on palpation, external ears without lesions,  NECK:  Supple, symmetrical, trachea midline, no adenopathy, thyroid symmetric, not enlarged and no tenderness, skin normal  LUNGS:  No increased work of breathing, good air exchange, clear to auscultation bilaterally, no crackles or wheezing  CARDIOVASCULAR:  Normal apical impulse, regular rate and rhythm, normal S1 and S2,  and no murmur noted  ABDOMEN:  Normal bowel sounds, soft, non-distended, non-tender, no masses palpated, no hepatosplenomegally  EXTREMITIES:  No edema, Pulses strong throughout. NEUROLOGIC:  Awake, alert, oriented to name, place and time.  Following all commands and moving all extremties  SKIN:  no bruising or bleeding, normal skin color, texture, turgor and no rashes     Data:       LABS:  Recent Results (from the past 24 hour(s))   POCT Glucose    Collection Time: 05/03/22 10:29 AM   Result Value Ref Range    POC Glucose 191 (H) 70 - 99 mg/dl    Performed on ACCU-CHEK    POCT Glucose    Collection Time: 05/03/22  3:53 PM   Result Value Ref Range    POC Glucose 171 (H) 70 - 99 mg/dl    Performed on ACCU-CHEK    POCT Glucose    Collection Time: 05/03/22  9:31 PM   Result Value Ref Range    POC Glucose 196 (H) 70 - 99 mg/dl    Performed on ACCU-CHEK    CBC with Auto Differential    Collection Time: 05/04/22  5:55 AM   Result Value Ref Range    WBC 10.8 4.8 - 10.8 K/uL    RBC 3.45 (L) 4.20 - 5.40 M/uL    Hemoglobin 8.5 (L) 12.0 - 16.0 g/dL    Hematocrit 25.9 (L) 37.0 - 47.0 %    MCV 75.1 (L) 82.0 - 100.0 fL    MCH 24.8 (L) 27.0 - 31.3 pg    MCHC 33.0 33.0 - 37.0 %    RDW 19.4 (H) 11.5 - 14.5 %    Platelets 264 574 - 808 K/uL    Neutrophils % 68.9 %    Lymphocytes % 12.7 %    Monocytes % 13.9 %    Eosinophils % 4.2 %    Basophils % 0.3 %    Neutrophils Absolute 7.4 (H) 1.4 - 6.5 K/uL    Lymphocytes Absolute 1.4 1.0 - 4.8 K/uL    Monocytes Absolute 1.5 (H) 0.2 - 0.8 K/uL    Eosinophils Absolute 0.5 0.0 - 0.7 K/uL    Basophils Absolute 0.0 0.0 - 0.2 K/uL   Comprehensive Metabolic Panel w/ Reflex to MG    Collection Time: 05/04/22  5:58 AM   Result Value Ref Range    Sodium 133 (L) 135 - 144 mEq/L    Potassium reflex Magnesium 4.5 3.4 - 4.9 mEq/L    Chloride 101 95 - 107 mEq/L    CO2 21 20 - 31 mEq/L    Anion Gap 11 9 - 15 mEq/L    Glucose 92 70 - 99 mg/dL    BUN 53 (H) 8 - 23 mg/dL    CREATININE 1.82 (H) 0.50 - 0.90 mg/dL    GFR Non-African American 27.9 (L) >60    GFR  33.8 (L) >60    Calcium 8.3 (L) 8.5 - 9.9 mg/dL    Total Protein 6.1 (L) 6.3 - 8.0 g/dL    Albumin 3.0 (L) 3.5 - 4.6 g/dL    Total Bilirubin 0.6 0.2 - 0.7 mg/dL    Alkaline Phosphatase 113 40 - 130 U/L    ALT 14 0 - 33 U/L    AST 32 0 - 35 U/L    Globulin 3.1 2.3 - 3.5 g/dL   Renal Function Panel    Collection Time: 05/04/22  5:58 AM   Result Value Ref Range    Potassium 4.5 3.4 - 4.9 mEq/L    Phosphorus 3.3 2.3 - 4.8 mg/dL   Magnesium    Collection Time: 05/04/22  5:58 AM   Result Value Ref Range    Magnesium 2.1 1.7 - 2.4 mg/dL   C-Reactive Protein    Collection Time: 05/04/22  5:58 AM   Result Value Ref Range    CRP 46.6 (H) 0.0 - 5.0 mg/L   POCT Glucose    Collection Time: 05/04/22  6:40 AM   Result Value Ref Range    POC Glucose 90 70 - 99 mg/dl    Performed on ACCU-CHEK             EKG:    Sinus bradycardia with 1st degree AV block   Nonspecific intraventricular conduction delay   ST & T wave abnormality, consider inferior ischemia   ST & T wave abnormality, consider anterolateral ischemia   Abnormal ECG     ECHO:   Left ventricular ejection fraction is visually estimated at 45-50%.    Near akinesis of septum, hypokinesis of septal aspect of apex and distal    most anteroseptal wall.    Overall LVEF seems a bit better than 8/2021    Right ventricle global systolic function is mildly reduced .    Moderately enlarged right ventricle cavity.    Right ventricular systolic pressure of 47 mm Hg consistent with moderate    pulmonary hypertension.    8/2021 RVSP was 60 mm Hg    Mildly dilated left atrium.    Markedly enlarged right atrium size.    Normal mitral valve structure    3+ MR    Normal aortic valve structure and function.    Normal tricuspid valve structure    Severe tricuspid regurgitation.              Emily Cole MD ,[facility]

## 2022-05-04 NOTE — PROGRESS NOTES
CLINICAL PHARMACY NOTE: MEDS TO BEDS    Total # of Prescriptions Filled: 3   The following medications were delivered to the patient:  · Potassium Chlor Er 20 meq Tab  · Carvedilol 25 mg Tab  · Hydralazine 50 mg Tab    Additional Documentation:

## 2022-05-04 NOTE — CARE COORDINATION
Informed by Thaddeus Barrow that pre cert for Jose Gutiérrez was approved. [facility] transportation scheduled for today at 12:30. Nurse and facility informed. [facility] rescheduled for 3:00. [id number] completed.

## 2022-05-09 NOTE — PROGRESS NOTES
Subjective:    [facility]  Patient ID: Juani Blakely is a pleasant 61 y.o. female who presents today for:  Chief Complaint   Patient presents with    Other     F/U   Is in and evaluated in her room. She is resting comfortably in bed. Completed 95% of her breakfast.  Prealbumin level returned back at 15. Weight has been stable. She is on vitamin protocol and nutritional supplements for wound healing. Resident denies chest pain, shortness of breath or palpitations. She is alert and oriented x2-3. Forgetful, but reorients easily. On DAPT. Melena, hematochezia, or hematemesis. Receiving therapy daily, bilateral lower extremity weakness.   6/60, 97.2, 70, 18, 97% on 2 L supplemental O2      Patient Active Problem List   Diagnosis    Major depressive disorder, recurrent, severe with psychotic symptoms ([address]    Diabetes mellitus ([address]    Hypertension    HLD (hyperlipidemia)    Thyroid disease    Congestive heart failure (HCC)    Non-pressure ulcer of toe ([address]    Atherosclerotic PVD with intermittent claudication ([address]    Osteomyelitis ([address]    Infection of skin    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    Surgical wound dehiscence, initial encounter    S/P amputation of lesser toe, right (HCC)    Rectal bleeding    Athscl native arteries of left leg w ulceration oth prt foot ([address]    JESICA (obstructive sleep apnea)    Secondary diabetes mellitus ([address]    Chest pain    Peripheral vascular disease ([address]    Cellulitis    Syncope and collapse    Nonrheumatic mitral (valve) insufficiency    Ischemic myocardial dysfunction    Pulmonary hypertension ([address]    Acute on chronic combined systolic and diastolic congestive heart failure (HCC)    Asthma    Acute kidney injury superimposed on CKD (HCC)    Dizziness    Acute cerebrovascular accident ([address]    Abnormal gait    Anxiety    Gastritis, unspecified, without bleeding    Pure hypercholesterolemia    Schizophrenia ([address]    Coronary arteriosclerosis    Extrapyramidal disease    Gangrene of toe (HCC)    Drug-induced hypotension    Osteomyelitis of right foot (HCC)    Nausea and vomiting    Mild intermittent asthma without complication    Heart failure, diastolic, with acute decompensation ([facility])    Major depressive disorder, single episode, unspecified    Type 2 diabetes mellitus with diabetic neuropathy, unspecified (HCC)    Obesity (BMI 30-39. 9)    Disorder of carotid artery (HCC)    NSTEMI (non-ST elevated myocardial infarction) ([address]    Pneumonia    CKD (chronic kidney disease) stage 3, GFR 30-59 ml/min ([facility])    Pain in right hand    Anesthesia of skin    Carpal tunnel syndrome    History of angioplasty of peripheral vessel    History of coronary artery bypass surgery    Paresthesia of skin    Acute decompensated heart failure (HCC)    Depression    Abdominal pain    Abdominal pain, right upper quadrant    Shock ([address]    Gram-positive bacteremia    Acute cholecystitis     Past Medical History:   Diagnosis Date    Asthma     CAD (coronary artery disease)     CKD (chronic kidney disease) stage 3, GFR 30-59 ml/min ([facility])     Colitis     Diabetes mellitus ([address]     Hyperlipidemia     Hypertension     PAD (peripheral artery disease) ([facility])     Prolonged emergence from general anesthesia     PVD (peripheral vascular disease) ([address]     Thyroid disease      Past Surgical History:   Procedure Laterality Date    CARDIAC SURGERY      CATARACT REMOVAL WITH IMPLANT Bilateral 11- 11-     SECTION      CHOLECYSTECTOMY, LAPAROSCOPIC N/A 2022    LAPAROSCOPIC CHOLECYSTECTOMY performed by Omero Kaur MD at [address] [facility] N/A 2019    COLONOSCOPY DIAGNOSTIC performed by Jason Paz MD at [address] [facility] GRAFT  2019    unknown vessels    HYSTERECTOMY      TOE AMPUTATION Right     3rd toe     Social History     Socioeconomic History    Marital status:      Spouse name: Not on file    Number of children: Not on file    Years of education: Not on file    Highest education level: Not on file   Occupational History    Not on file   Tobacco Use    Smoking status: Never Smoker    Smokeless tobacco: Never Used   Vaping Use    Vaping Use: Never used   Substance and Sexual Activity    Alcohol use: Never    Drug use: Never    Sexual activity: Not on file   Other Topics Concern    Not on file   Social History Narrative         Lives With: Spouse    Type of Home: [address] [place] [address] [address]: One level    Home Access: Elevator    Bathroom Shower/Tub: Tub/Shower unit(Simultaneous filing. User may not have seen previous data.)    Bathroom Equipment: Grab bars in shower, Shower chair    Home Equipment: Rolling walker, [address] bed    ADL Assistance: Needs assistance    Homemaking Assistance: Needs assistance    Homemaking Responsibilities: No    Ambulation Assistance: Independent    Transfer Assistance: Independent    Active : No    Additional Comments: Pt wears orthopedic shoes at baseline    The patient states she is current with [facility](RN per the ). The patient had a walker, but obtained a hospital bed, wheel chair, BSC and O2 last admission (from [address] [place] [address] Road). pharmacy is [address] [place] [address],[address] [address]., [facility]. Transportation is provided by [employer]	[place] () per the patient     Social Determinants of Health     Financial Resource Strain:     Difficulty of Paying Living Expenses: Not on file   Food Insecurity:     Worried About [address] in the Last Year: Not on file    Shayy of Food in the Last Year: Not on file   Transportation Needs:     Lack of Transportation (Medical): Not on file    Lack of Transportation (Non-Medical):  Not on file   Physical Activity:     Days of Exercise per Week: Not on file    Minutes of Exercise per Session: Not on file   Stress:     Feeling of Stress : Not on file   Social Connections:     Frequency of Communication with Friends and Family: Not on file    Frequency of Social Gatherings with Friends and Family: Not on file    Attends Faith Services: Not on file    Active Member of Clubs or Organizations: Not on file    Attends Club or Organization Meetings: Not on file    Marital Status: Not on file   Intimate Partner Violence:     Fear of Current or Ex-Partner: Not on file    Emotionally Abused: Not on file    Physically Abused: Not on file    Sexually Abused: Not on file   Housing Stability:     Unable to Pay for Housing in the Last Year: Not on file    Number of [place] in the Last Year: Not on file    Unstable Housing in the Last Year: Not on file     No family history on file. Allergies   Allergen Reactions    Ambien [Zolpidem Tartrate]     Capoten [Captopril]     Clioquinol     Cogentin [Benztropine]     Depakote [Divalproex Sodium]     Effexor Xr [Venlafaxine Hcl Er]     Geodon [Ziprasidone Hcl]     Lisinopril      Hyperkalemia: 4/21/20 potassium was 6.7    Lyrica [Pregabalin]     Navane [Thiothixene]     Pamelor [Nortriptyline Hcl]     Remeron [Mirtazapine]     Risperdal [Risperidone]     Seroquel [Quetiapine]     Trazodone And Nefazodone     Wellbutrin [Bupropion]          Review of Systems   All other systems reviewed and are negative. Objective:       Physical Exam  Vitals reviewed. Constitutional:       General: She is awake. She is not in acute distress. Appearance: She is well-developed. She is obese. She is ill-appearing. She is not toxic-appearing or diaphoretic. Interventions: Nasal cannula in place. HENT:      Head: Normocephalic and atraumatic. Eyes:      General: No scleral icterus. Conjunctiva/sclera: Conjunctivae normal.      Pupils: Pupils are equal, round, and reactive to light.    Cardiovascular: Rate and Rhythm: Normal rate and regular rhythm. Pulses: Normal pulses. Heart sounds: Murmur heard. Pulmonary:      Effort: Pulmonary effort is normal. No respiratory distress. Breath sounds: Normal breath sounds. No stridor. No wheezing, rhonchi or rales. Chest:      Chest wall: No tenderness. Abdominal:      General: Abdomen is protuberant. A surgical scar is present. Bowel sounds are normal. There is no distension. Palpations: Abdomen is soft. There is no mass. Tenderness: There is no abdominal tenderness. There is no guarding or rebound. Comments: Surgical scars for heather are well aprrox, no drainage or erythema. Large panus. Musculoskeletal:         General: Normal range of motion. Cervical back: Normal range of motion and neck supple. Right lower leg: Edema present. Left lower leg: Edema present. Comments: Nonpitting edema   Lymphadenopathy:      Cervical: No cervical adenopathy. Skin:     General: Skin is warm and dry. Coloration: Skin is not cyanotic, jaundiced, mottled or pale. Findings: Bruising, ecchymosis (abd, bilat upper extremities ) and wound (see wound care notes ) present. No erythema or rash. Neurological:      Mental Status: She is alert. Mental status is at baseline. Motor: Weakness (bilat LE) present. Comments: Oriented x 2-3   Psychiatric:         Mood and Affect: Mood normal.         Behavior: Behavior normal. Behavior is cooperative. Thought Content: Thought content normal.         Judgment: Judgment normal.         Assessment:       Diagnosis Orders   1. Primary hypertension     2. Bilateral leg weakness     3. Bilateral leg edema           Plan:   1. Stable. No episodes of hypo or hypertension. Continue current POC and adhere to JNC 8 guidelines.  -Routine labs  2. Continue  with PT/OT to help with endurance, strength and balance  3. TANISHA hose bilateral, off at night.   Elevate legs while at rest.  -KENJI diet   -increase activity.   - Monitor for any acute changes. Side effects, adverse effects of the medication prescribed today, as well as treatment plan and result expectations have beendiscussed with the patient who expresses understanding and desires to proceed.     Dhara Perez, APRN - CNP

## 2022-05-10 NOTE — PROGRESS NOTES
Subjective:      Patient ID: Camron Silvestre is a 61 y.o. female who presents for:  Chief Complaint   Patient presents with    Post-Op Check       She returns to the office 2 weeks out from a laparoscopic cholecystectomy for symptomatic gallstones. Her hospital course was complicated by heart block requiring reintubation and transcutaneous pacing. This was managed medically. She was in the hospital for another week following this. She was here with her daughter. Neither speak [place]. With the aid of the  I went over her hospital course as well as the histology which showed chronic cholecystitis with gallstones. She still has a urinary catheter in place. She has some edema and heavy leg feeling. She has some bloating of her abdomen. Her bowels are not working great but they are apparently giving her some medication for constipation at the nursing home she is currently. She has some pain at her incisions. She denies any nausea or vomiting.       Past Medical History:   Diagnosis Date    Asthma     CAD (coronary artery disease)     CKD (chronic kidney disease) stage 3, GFR 30-59 ml/min ([facility])     Colitis     Diabetes mellitus ([address]     Hyperlipidemia     Hypertension     PAD (peripheral artery disease) ([facility])     Prolonged emergence from general anesthesia     PVD (peripheral vascular disease) ([address]     Thyroid disease      Past Surgical History:   Procedure Laterality Date    CARDIAC SURGERY      CATARACT REMOVAL WITH IMPLANT Bilateral 11- 11-     SECTION      CHOLECYSTECTOMY, LAPAROSCOPIC N/A 2022    LAPAROSCOPIC CHOLECYSTECTOMY performed by Liu Krishnan MD at [facility] N/A 2019    COLONOSCOPY DIAGNOSTIC performed by Alonzo Crowe MD at [address] [place] [address]  2019    unknown vessels    HYSTERECTOMY      TOE AMPUTATION Right     3rd toe     Social History     Socioeconomic History    Marital status:      Spouse name: Not on file    Number of children: Not on file    Years of education: Not on file    Highest education level: Not on file   Occupational History    Not on file   Tobacco Use    Smoking status: Never Smoker    Smokeless tobacco: Never Used   Vaping Use    Vaping Use: Never used   Substance and Sexual Activity    Alcohol use: Never    Drug use: Never    Sexual activity: Not on file   Other Topics Concern    Not on file   Social History Narrative         Lives With: Spouse    Type of Home: [address] [place] [address] [id number]: One level    Home Access: Elevator    Bathroom Shower/Tub: Tub/Shower unit(Simultaneous filing. User may not have seen previous data.)    Bathroom Equipment: Grab bars in shower, Shower chair    Home Equipment: Rolling walker, [address] bed    ADL Assistance: Needs assistance    Homemaking Assistance: Needs assistance    Homemaking Responsibilities: No    Ambulation Assistance: Independent    Transfer Assistance: Independent    Active : No    Additional Comments: Pt wears orthopedic shoes at baseline    The patient states she is current with [facility](RN per the ). The patient had a walker, but obtained a hospital bed, wheel chair, BSC and O2 last admission (from [address] [place] [address] Road). pharmacy is [address] [place] [address],[address] [address]., [facility]. Transportation is provided by [employer]	[place] () per the patient     Social Determinants of Health     Financial Resource Strain:     Difficulty of Paying Living Expenses: Not on file   Food Insecurity:     Worried About [address] in the Last Year: Not on file    Shayy of Food in the Last Year: Not on file   Transportation Needs:     Lack of Transportation (Medical): Not on file    Lack of Transportation (Non-Medical):  Not on file   Physical Activity:     Days of Exercise per Week: Not on file    Minutes of Exercise per Session: Not on file   Stress:     Feeling of Stress : Not on file   Social Connections:     Frequency of Communication with Friends and Family: Not on file    Frequency of Social Gatherings with Friends and Family: Not on file    Attends Protestant Services: Not on file    Active Member of Clubs or Organizations: Not on file    Attends Club or Organization Meetings: Not on file    Marital Status: Not on file   Intimate Partner Violence:     Fear of Current or Ex-Partner: Not on file    Emotionally Abused: Not on file    Physically Abused: Not on file    Sexually Abused: Not on file   Housing Stability:     Unable to Pay for Housing in the Last Year: Not on file    Number of [place] in the Last Year: Not on file    Unstable Housing in the Last Year: Not on file     No family history on file. Allergies:  Ambien [zolpidem tartrate], Capoten [captopril], Clioquinol, Cogentin [benztropine], Depakote [divalproex sodium], Effexor xr [venlafaxine hcl er], Geodon [ziprasidone hcl], Lisinopril, Lyrica [pregabalin], Navane [thiothixene], Pamelor [nortriptyline hcl], Remeron [mirtazapine], Risperdal [risperidone], Seroquel [quetiapine], Trazodone and nefazodone, and Wellbutrin [bupropion]    Review of Systems    Objective:    Pulse 68   Temp 96.7 °F (35.9 °C) (Temporal)   Ht 5' 4\" (1.626 m)   Wt 261 lb (118.4 kg)   SpO2 98%   BMI 44.80 kg/m²     Physical Exam  On exam her abdomen is soft. Her incisions are all healed and the staples were removed. Steri-Strips were placed for epithelial support at the epigastric port site. There is bruising present        Assessment/Plan:          Diagnosis Orders   1. Calculus of gallbladder with chronic cholecystitis without obstruction       Patient's recovery going well. I was not involved with the Colon catheter so I am not sure how long that should stay in.     Medication for constipation such as milk of magnesia or MiraLAX acceptable    Please call if any questions regarding her incisions or histology. Please note this report has beenpartially produced using speech recognition software and may cause contain errors related to that system including grammar, punctuation and spelling as well as words and phrases that may seem inappropriate.  If there arequestions or concerns please feel free to contact me to clarify

## 2022-05-12 PROBLEM — I67.9 CEREBROVASCULAR DISEASE: Status: ACTIVE | Noted: 2020-05-07

## 2022-05-12 PROBLEM — R26.0 ATAXIC GAIT: Status: ACTIVE | Noted: 2020-05-08

## 2022-05-12 PROBLEM — G72.9 MYOPATHY: Status: ACTIVE | Noted: 2022-01-01

## 2022-05-12 PROBLEM — G63 POLYNEUROPATHY ASSOCIATED WITH UNDERLYING DISEASE (HCC): Status: ACTIVE | Noted: 2022-01-01

## 2022-05-12 PROBLEM — I63.512 CEREBROVASCULAR ACCIDENT (CVA) DUE TO STENOSIS OF LEFT MIDDLE CEREBRAL ARTERY (HCC): Status: ACTIVE | Noted: 2020-05-07

## 2022-05-12 PROBLEM — G93.40 ENCEPHALOPATHY: Status: ACTIVE | Noted: 2019-06-28

## 2022-05-12 NOTE — PROGRESS NOTES
Subjective:      Patient ID: Perlita Rodriguez is a 61 y.o. female who presents today for:  Chief Complaint   Patient presents with    Follow-up     Pt states that she is doing good. No worsening problems at this time. In december she had lab work done and she would like to go over results today. HPI 61 right-handed female who has not been seen by me. Entire evaluation done through translation there for the time taken. Patient was seen by my nurse tal and this and my last encounter was May 2020. Richie Manning Patient has a history of severe bilateral carotid stenosis. Patient had a right frontal CVA in May 2020. At that time we will start her on dual antiplatelet therapy with a carotid ultrasound showing 50 to 69% stenosis of left internal carotid. .  All her other tests were normal she has severe pulmonary hypertension. Patient weight she is doing well without any major issues and we had not had to do any other assessments. She had some blood work-up done. Patient was in the hospital for acute encephalopathy in April and I had not seen her at that time. Patient was bradycardic and hypotensive  And required cardiac evaluation for bradycardia and she had a transcutaneous pacer. She then started to recover from the same did not require any other intervention. She is not any recent falls injuries or trauma. As noted extensive chart review to be done as patient is not ambulating much now. Prior to this and after the stroke she was ambulated. She has been in bed for some time and she has increased leg swelling and an indwelling catheter as well she denies any back pain. Patient is just started to stand now. Patient has peripheral neuropathy which is worsened.     Past Medical History:   Diagnosis Date    Asthma     CAD (coronary artery disease)     CKD (chronic kidney disease) stage 3, GFR 30-59 ml/min ([facility])     Colitis     Diabetes mellitus ([facility] [address]     Hyperlipidemia     Hypertension     PAD (peripheral artery disease) (HCC)     Prolonged emergence from general anesthesia     PVD (peripheral vascular disease) ([facility] [address]     Thyroid disease      Past Surgical History:   Procedure Laterality Date    CARDIAC SURGERY      CATARACT REMOVAL WITH IMPLANT Bilateral 11- 11-     SECTION      CHOLECYSTECTOMY, LAPAROSCOPIC N/A 2022    LAPAROSCOPIC CHOLECYSTECTOMY performed by Camelia Horn MD at [facility] COLONOSCOPY N/A 2019    COLONOSCOPY DIAGNOSTIC performed by Ton Gu MD at [facility] GRAFT  2019    unknown vessels    HYSTERECTOMY      TOE AMPUTATION Right     3rd toe     Social History     Socioeconomic History    Marital status:      Spouse name: Not on file    Number of children: Not on file    Years of education: Not on file    Highest education level: Not on file   Occupational History    Not on file   Tobacco Use    Smoking status: Never Smoker    Smokeless tobacco: Never Used   Vaping Use    Vaping Use: Never used   Substance and Sexual Activity    Alcohol use: Never    Drug use: Never    Sexual activity: Not on file   Other Topics Concern    Not on file   Social History Narrative         Lives With: Spouse    Type of Home: [address] [place] [address] [address]: One level    Home Access: Elevator    Bathroom Shower/Tub: Tub/Shower unit(Simultaneous filing. User may not have seen previous data.)    Bathroom Equipment: Grab bars in shower, Shower chair    Home Equipment: Rolling walker, [address] 450 bed    ADL Assistance: Needs assistance    Homemaking Assistance: Needs assistance    Homemaking Responsibilities: No    Ambulation Assistance: Independent    Transfer Assistance: Independent    Active : No    Additional Comments: Pt wears orthopedic shoes at baseline    The patient states she is current with [facility](RN per the ).   The patient had a walker, but obtained a hospital bed, wheel chair, BSC and O2 last admission (from [address]). pharmacy is [address] [place] [address],[address] [address] [place]. Transportation is provided by [employer]	[place] () per the patient     Social Determinants of Health     Financial Resource Strain:     Difficulty of Paying Living Expenses: Not on file   Food Insecurity:     Worried About [facility] in the Last Year: Not on file    Shayy of Food in the Last Year: Not on file   Transportation Needs:     Lack of Transportation (Medical): Not on file    Lack of Transportation (Non-Medical): Not on file   Physical Activity:     Days of Exercise per Week: Not on file    Minutes of Exercise per Session: Not on file   Stress:     Feeling of Stress : Not on file   Social Connections:     Frequency of Communication with Friends and Family: Not on file    Frequency of Social Gatherings with Friends and Family: Not on file    Attends Yazidi Services: Not on file    Active Member of [address] [place] [address] or Organizations: Not on file    Attends Club or Organization Meetings: Not on file    Marital Status: Not on file   Intimate Partner Violence:     Fear of Current or Ex-Partner: Not on file    Emotionally Abused: Not on file    Physically Abused: Not on file    Sexually Abused: Not on file   Housing Stability:     Unable to Pay for Housing in the Last Year: Not on file    Number of [place] in the Last Year: Not on file    Unstable Housing in the Last Year: Not on file     No family history on file.   Allergies   Allergen Reactions    Ambien [Zolpidem Tartrate]     Aripiprazole     Capoten [Captopril]     Clioquinol     Clonazepam      Other reaction(s): Palpitations    Cogentin [Benztropine]     Depakote [Divalproex Sodium]     Effexor Xr [Venlafaxine Hcl Er]     Geodon [Ziprasidone Hcl]     Lisinopril      Hyperkalemia: 4/21/20 potassium was 6.7    Lyrica [Pregabalin]     Navane [Thiothixene]     Pamelor [Nortriptyline Hcl]     Remeron [Mirtazapine]     Risperdal [Risperidone]     Seroquel [Quetiapine]     Trazodone And Nefazodone     Wellbutrin [Bupropion]     Ziprasidone      Other reaction(s): Other, Sleeplessness    Zolpidem      Other reaction(s):  Insomnia, Other       Current Outpatient Medications   Medication Sig Dispense Refill    iron polysaccaride (FERREX 150 FORTE PLUS)  MG CAPS Take 1 capsule by mouth daily      furosemide (LASIX) 20 MG tablet Take 20 mg by mouth every morning (before breakfast)      furosemide (LASIX) 40 MG tablet Take 40 mg by mouth every morning      carvedilol (COREG) 6.25 MG tablet Take 6.25 mg by mouth 2 times daily (with meals) Indications: High Blood Pressure Disorder      hydrALAZINE (APRESOLINE) 50 MG tablet Take 1 tablet by mouth every 8 hours Hold if SBP < 120 (Patient taking differently: Take 50 mg by mouth every 8 hours Indications: High Blood Pressure Disorder Hold if SBP < 120) 90 tablet 3    potassium chloride (KLOR-CON M) 20 MEQ extended release tablet Take 1 tablet by mouth daily (Patient taking differently: Take 20 mEq by mouth daily Indications: Nutritional Support ) 60 tablet 3    dicyclomine (BENTYL) 10 MG capsule Take 1 capsule by mouth every 6 hours as needed (cramps) (Patient taking differently: Take 10 mg by mouth every 6 hours as needed (cramps) Indications: Cramping Pain in the Abdomen ) 20 capsule 0    ondansetron (ZOFRAN ODT) 4 MG disintegrating tablet Take 1 tablet by mouth every 8 hours as needed for Nausea 20 tablet 0    albuterol sulfate HFA (VENTOLIN HFA) 108 (90 Base) MCG/ACT inhaler Inhale 2 puffs into the lungs as needed for Wheezing Every 4-6 hours PRN (Patient taking differently: Inhale 2 puffs into the lungs as needed for Wheezing Indications: Chronic Obstructive Lung Disease Every 4-6 hours PRN ) 1 each 3    montelukast (SINGULAIR) 10 MG tablet Take 1 tablet by mouth nightly (Patient taking differently: Take 10 mg by mouth nightly Indications: Seasonal Allergy ) 30 tablet 3    insulin glargine (LANTUS SOLOSTAR) 100 UNIT/ML injection pen 60 units at bedtime (Patient taking differently: Inject 60 Units into the skin nightly Indications: Type 2 Diabetes 60 units at bedtime) 15 pen 3    insulin lispro, 1 Unit Dial, (HUMALOG KWIKPEN) 100 UNIT/ML SOPN 15  units at each meals (Patient taking differently: Inject 15 Units into the skin 3 times daily (before meals) Indications: Type 2 Diabetes 15  units at each meals) 10 pen 03    Dulaglutide (TRULICITY) 9.63 OC/8.0IR SOPN Inject 0.75 mg into the skin once a week (Patient taking differently: Inject 0.75 mg into the skin once a week Indications: Type 2 Diabetes ) 4 pen 3    levothyroxine (SYNTHROID) 50 MCG tablet take 1 tablet by mouth once daily (Patient taking differently: Take 50 mcg by mouth Daily Indications: Underactive Thyroid take 1 tablet by mouth once daily) 30 tablet 5    oxybutynin (DITROPAN-XL) 5 MG extended release tablet take 1 tablet by mouth once daily (Patient taking differently: Take 5 mg by mouth daily Indications: Bladder Spasm ) 90 tablet 3    busPIRone (BUSPAR) 10 MG tablet Take 10 mg by mouth in the morning and at bedtime Indications: Schizophrenia       prazosin (MINIPRESS) 2 MG capsule Take 2 mg by mouth nightly Indications: High Blood Pressure Disorder       ammonium lactate (LAC-HYDRIN) 12 % lotion Apply topically daily as needed for Dry Skin       atorvastatin (LIPITOR) 80 MG tablet Take 80 mg by mouth nightly Indications: High Amount of Fats in the Blood       diclofenac sodium (VOLTAREN) 1 % GEL Apply 2 g topically Indications: Pain       doxepin (SINEQUAN) 25 MG capsule Take 25 mg by mouth nightly Indications: Depression       ranolazine (RANEXA) 1000 MG extended release tablet Take 1 tablet by mouth 2 times daily (Patient taking differently: Take 1,000 mg by mouth 2 times daily Indications: Angina Pectoris ) 60 tablet 3    gabapentin (NEURONTIN) 600 MG tablet Take 600 mg by mouth 2 times daily.  Indications: Nerve Disease       RESTASIS 0.05 % ophthalmic emulsion Place 1 drop into both eyes nightly Indications: Dry Eyes       ARTIFICIAL TEARS 1.4 % ophthalmic solution Place 1 drop into both eyes in the morning and at bedtime       docusate sodium (COLACE, DULCOLAX) 100 MG CAPS Take 100 mg by mouth 2 times daily (Patient taking differently: Take 200 mg by mouth nightly Indications: Constipation At bedtime.) 60 capsule 1    sertraline (ZOLOFT) 100 MG tablet Take 100 mg by mouth daily Indications: Depression       ticagrelor (BRILINTA) 90 MG TABS tablet Take 90 mg by mouth 2 times daily Indications: Diabetes       CPAP Machine MISC by Does not apply route New CPAP with 10 cm 1 each 0    aspirin 81 MG EC tablet Take 1 tablet by mouth daily (Patient taking differently: Take 81 mg by mouth daily Indications: Peripheral Vascular Disease ) 30 tablet 3    OXYGEN 2 lit O2 with sleep , please give O2 concentrator 1 Units 0    Emollient (EUCERIN INTENSIVE REPAIR HAND) 2.5-10 % CREA APPLY TO FEET AT BEDTIME; PLACE SOCKS OVER FEET AFTER APPLICATION IF NEEDED FOR DRY CRACKING FEET      hydrOXYzine (VISTARIL) 25 MG capsule Take 50 mg by mouth 3 times daily as needed for Anxiety       Nutritional Supplements (GLUCERNA SHAKE) LIQD take as directed three times a day      isosorbide dinitrate (ISORDIL) 20 MG tablet Take 1 tablet by mouth 3 times daily (Patient taking differently: Take 20 mg by mouth 3 times daily Indications: Schizophrenia ) 90 tablet 3    haloperidol (HALDOL) 5 MG tablet Take 5 mg by mouth nightly Indications: Schizophrenia       folic acid (FOLVITE) 1 MG tablet Take 1 mg by mouth daily Indications: Anemia       Iron Polysacch [id number]-B68-CK (NIFEREX-150 FORTE PO) Take 1 tablet by mouth daily       Cyanocobalamin (VITAMIN B-12) 1000 MCG extended release tablet Take 1,000 mcg by mouth daily Indications: Anemia  meclizine (ANTIVERT) 25 MG tablet Take 25 mg by mouth 2 times daily Indications: Sensation of Spinning or Whirling       albuterol (PROVENTIL) (2.5 MG/3ML) 0.083% nebulizer solution Take 3 mLs by nebulization every 6 hours as needed for Wheezing 120 each 3    neomycin-polymyxin-dexameth (MAXITROL) 3.5-01796-1.1 ophthalmic suspension Place 1 drop into both eyes 4 times daily Indications: Glaucoma  (Patient not taking: Reported on 5/12/2022)      nitroGLYCERIN (NITROSTAT) 0.4 MG SL tablet up to max of 3 total doses. If no relief after 1 dose, call 911. (Patient not taking: Reported on 5/12/2022) 25 tablet 3     No current facility-administered medications for this visit. Review of Systems   Constitutional: Negative for fever. HENT: Negative for ear pain, tinnitus and trouble swallowing. Eyes: Negative for photophobia and visual disturbance. Respiratory: Negative for choking and shortness of breath. Cardiovascular: Negative for chest pain and palpitations. Gastrointestinal: Negative for nausea and vomiting. Musculoskeletal: Negative for back pain, gait problem, joint swelling, myalgias, neck pain and neck stiffness. Skin: Negative for color change. Allergic/Immunologic: Negative for food allergies. Neurological: Positive for weakness and numbness. Negative for dizziness, tremors, seizures, syncope, facial asymmetry, speech difficulty, light-headedness and headaches. Psychiatric/Behavioral: Negative for behavioral problems, confusion, hallucinations and sleep disturbance. Objective:   BP (!) 113/52 (Site: Right Upper Arm, Position: Sitting, Cuff Size: Large Adult)   Pulse 68     Physical Exam  Vitals reviewed. Eyes:      Pupils: Pupils are equal, round, and reactive to light. Cardiovascular:      Rate and Rhythm: Normal rate and regular rhythm. Heart sounds: No murmur heard.       Pulmonary:      Effort: Pulmonary effort is normal.      Breath sounds: Normal breath sounds. Abdominal:      General: Bowel sounds are normal.   Musculoskeletal:         General: Normal range of motion. Cervical back: Normal range of motion. Skin:     General: Skin is warm. Neurological:      Mental Status: She is alert and oriented to person, place, and time. Cranial Nerves: No cranial nerve deficit. Sensory: No sensory deficit. Motor: No abnormal muscle tone. Coordination: Coordination normal.      Deep Tendon Reflexes: Reflexes are normal and symmetric. Babinski sign absent on the right side. Babinski sign absent on the left side. Psychiatric:         Mood and Affect: Mood normal.         CT ABDOMEN PELVIS WO CONTRAST Additional Contrast? None    Result Date: 4/23/2022  EXAM:  CT ABDOMEN PELVIS WO CONTRAST History: Abdominal pain Technique: Multiple contiguous axial images were obtained of the abdomen and pelvis from the level of the lung bases through the ischial tuberosities without contrast. Multiplanar reformats were obtained. Comparison: CT abdomen pelvis April 20, 2022 Findings: Lung bases are clear. Heart size is enlarged. Mitral annular calcification. Evidence of prior thoracic surgery. Lack of intravenous contrast precludes optimal evaluation of the abdominal and pelvic viscera. The unenhanced liver, spleen, stomach, pancreas, and adrenal glands appear within normal limits. The gallbladder is mildly distended but not significant changed from recent CT. There are debris is present within the gallbladder lumen. No gallbladder wall thickening or pericholecystic fluid identified by CT. Mild bilateral perinephric stranding. No urinary tract calculi or hydronephrosis. The urinary bladder is decompressed. The uterus is absent. Abdominal aorta is nonaneurysmal  . No retroperitoneal or abdominal/pelvic lymphadenopathy. No small bowel obstruction. No overt colonic mass or pericolonic inflammation.  No findings of acute appendicitis No free fluid, loculated fluid collection, or pneumoperitoneum. No acute osseous abnormality. Mildly distended gallbladder containing gallbladder sludge and/or tiny gallstones without significant interval change since April 20, 2022 CT. No CT findings of acute cholecystitis. Mild bilateral perinephric stranding is nonspecific. Correlation for polynephritis is recommended. All CT scans at this facility use dose modulation, iterative reconstruction, and/or weight based dosing when appropriate to reduce radiation dose to as low as reasonably achievable. US ABDOMEN LIMITED    Result Date: 4/23/2022  EXAM: US ABDOMEN LIMITED HISTORY: Right upper quadrant pain TECHNIQUE: Ultrasound evaluation was performed of the right upper quadrant of the abdomen. COMPARISON: CT abdomen pelvis April 23, 2022 FINDINGS: Normal echogenicity and contour of the liver. No liver lesion or intrahepatic biliary dilatation. Main portal vein is patent. The gallbladder is mildly distended. Layering echogenic material is identified within the lumen of the gallbladder. No pericholecystic fluid. Gallbladder wall thickness is at the upper limits of normal measuring 2.7 mm. Negative Velez sign reported. Common bile duct is normal in diameter measuring 4 mm. No overt abnormality of the pancreas. Nonspecific findings of the gallbladder including mildly distended gallbladder containing layering sludge and/or tiny gallstones with wall thickness of the upper limits of normal measuring 2.7 mm. No pericholecystic fluid to suggest acute cholecystitis.        Lab Results   Component Value Date    WBC 10.8 05/04/2022    RBC 3.45 05/04/2022    HGB 8.5 05/04/2022    HCT 25.9 05/04/2022    MCV 75.1 05/04/2022    MCH 24.8 05/04/2022    MCHC 33.0 05/04/2022    RDW 19.4 05/04/2022     05/04/2022     Lab Results   Component Value Date     05/04/2022    K 4.5 05/04/2022    K 4.5 05/04/2022     05/04/2022    CO2 21 05/04/2022    BUN 53 05/04/2022    CREATININE 1.82 05/04/2022    GFRAA 33.8 05/04/2022    LABGLOM 27.9 05/04/2022    GLUCOSE 92 05/04/2022    GLUCOSE 279 01/06/2020    PROT 6.1 05/04/2022    LABALBU 3.0 05/04/2022    LABALBU <7.0 01/06/2020    CALCIUM 8.3 05/04/2022    BILITOT 0.6 05/04/2022    ALKPHOS 113 05/04/2022    AST 32 05/04/2022    ALT 14 05/04/2022     Lab Results   Component Value Date    PROTIME 17.3 04/27/2022    INR 1.4 04/27/2022     Lab Results   Component Value Date    TSH 1.470 07/01/2021    FERRITIN 80.1 09/02/2020    IRON 42 11/14/2021    TIBC 254 11/14/2021     Lab Results   Component Value Date    TRIG 102 03/09/2022    HDL 41 03/09/2022    LDLCALC 55 03/09/2022     Lab Results   Component Value Date    LABAMPH Neg 04/21/2020    BARBSCNU Neg 04/21/2020    LABBENZ POSITIVE 04/21/2020    LABMETH Neg 04/21/2020    OPIATESCREENURINE Neg 04/21/2020    PHENCYCLIDINESCREENURINE Neg 04/21/2020    ETOH <10 04/21/2020     No results found for: LITHIUM, DILFRTOT, VALPROATE    Assessment:       Diagnosis Orders   1. Cerebrovascular disease     2. Polyneuropathy associated with underlying disease ([facility] [address]     3. Ataxic gait     4. Myopathy     5. Encephalopathy       Cerebrovascular disease with a previous history of a stroke. Patient has not been seen by me since May 2020 when she had a stroke. She did continue to improve as followed by my nurse practitioner. Since last seen she had a hospitalization last month and she actually had sepsis from her gallbladder surgery. She was in bed for long period time she had bradycardia hypotension encephalopathy and had to require external pacemaker. She has extensive cardiac history. There is no mention of any focal other neurological symptoms and signs. Though her walking is changed. Since then she has continued to have leg swelling and her walking is decreased. She actually is really able to stand. She has regressed and has developed a myopathy. Inpatient hospital records extensively reviewed.   Each specialty and there for the time taken. We had not seen her in the hospital.    And try evaluation done through translation I did explain to her that her cerebral functions appear to be normal but the weakness is from disuse and being in bed for so long with swelling of the legs and diabetic neuropathy. She is likely to require extensive therapy. In the future we may need to consider evaluating her lumbar spine if she does not improve. We will follow in a few months and earlier with any concerns   Plan:      No orders of the defined types were placed in this encounter. No orders of the defined types were placed in this encounter. Return in about 6 months (around 11/12/2022).       Kyrie Fraga MD

## 2022-05-13 NOTE — PROGRESS NOTES
Subjective:    [facility]  Patient ID: Red Scott is a pleasant 61 y.o. female who presents today for:  Chief Complaint   Patient presents with    Other     F/U   Nursing staff with concerns of vaginal yeast infection, white clumpy discharge noticed upon JAVIER care. Patient does report some vaginal discomfort x2 days. No vaginal bleeding. Large pannus with excoriation. Has follow-up scheduled with cardiology, endocrinology, pulmonology and urology. Currently seen by neurology and general surgeon. Patient denies chest pain, shortness of breath or palpitations. Has bilateral lower extremity edema, Lasix was increased to 60 mg for 3 days.   See [facility] for updated vital signs    Patient Active Problem List   Diagnosis    Major depressive disorder, recurrent, severe with psychotic symptoms ([address]    Diabetes mellitus ([address]    Hypertension    HLD (hyperlipidemia)    Thyroid disease    Congestive heart failure (HCC)    Non-pressure ulcer of toe (HCC)    Atherosclerotic PVD with intermittent claudication (HCC)    Osteomyelitis ([address]    Infection of skin    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    Surgical wound dehiscence, initial encounter    S/P amputation of lesser toe, right (HCC)    Rectal bleeding    Athscl native arteries of left leg w ulceration oth prt foot ([address]    JESICA (obstructive sleep apnea)    Secondary diabetes mellitus (HCC)    Chest pain    Peripheral vascular disease (HCC)    Cellulitis    Syncope and collapse    Nonrheumatic mitral (valve) insufficiency    Ischemic myocardial dysfunction    Pulmonary hypertension (HCC)    Acute on chronic combined systolic and diastolic congestive heart failure (HCC)    Asthma    Acute kidney injury superimposed on CKD (HCC)    Dizziness    Cerebrovascular disease    Ataxic gait    Anxiety    Gastritis, unspecified, without bleeding    Pure hypercholesterolemia    Schizophrenia ([address]    Coronary arteriosclerosis    Encephalopathy    Gangrene of toe (HCC)    Drug-induced hypotension    Osteomyelitis of right foot (HCC)    Nausea and vomiting    Mild intermittent asthma without complication    Heart failure, diastolic, with acute decompensation ([facility])    Major depressive disorder, single episode, unspecified    Type 2 diabetes mellitus with diabetic neuropathy, unspecified ([facility])    Obesity (BMI 30-39. 9)    Disorder of carotid artery (HCC)    NSTEMI (non-ST elevated myocardial infarction) ([address]    Pneumonia    CKD (chronic kidney disease) stage 3, GFR 30-59 ml/min ([facility])    Pain in right hand    Anesthesia of skin    Carpal tunnel syndrome    History of angioplasty of peripheral vessel    History of coronary artery bypass surgery    Paresthesia of skin    Acute decompensated heart failure (HCC)    Depression    Abdominal pain    Abdominal pain, right upper quadrant    Shock ([address]    Gram-positive bacteremia    Acute cholecystitis    Myopathy    Polyneuropathy associated with underlying disease ([address]     Past Medical History:   Diagnosis Date    Asthma     CAD (coronary artery disease)     CKD (chronic kidney disease) stage 3, GFR 30-59 ml/min ([facility])     Colitis     Diabetes mellitus ([address]     Hyperlipidemia     Hypertension     PAD (peripheral artery disease) ([facility])     Prolonged emergence from general anesthesia     PVD (peripheral vascular disease) ([address]     Thyroid disease      Past Surgical History:   Procedure Laterality Date    CARDIAC SURGERY      CATARACT REMOVAL WITH IMPLANT Bilateral 11- 11-     SECTION      CHOLECYSTECTOMY, LAPAROSCOPIC N/A 2022    LAPAROSCOPIC CHOLECYSTECTOMY performed by Adrian Platt MD at [facility] N/A 2019    COLONOSCOPY DIAGNOSTIC performed by Ean Palomino MD at [facility]  2019    unknown vessels    HYSTERECTOMY      TOE AMPUTATION Right     3rd toe Social History     Socioeconomic History    Marital status:      Spouse name: Not on file    Number of children: Not on file    Years of education: Not on file    Highest education level: Not on file   Occupational History    Not on file   Tobacco Use    Smoking status: Never Smoker    Smokeless tobacco: Never Used   Vaping Use    Vaping Use: Never used   Substance and Sexual Activity    Alcohol use: Never    Drug use: Never    Sexual activity: Not on file   Other Topics Concern    Not on file   Social History Narrative         Lives With: Spouse    Type of Home: [address] [place] [address] [address]: One level    Home Access: Elevator    Bathroom Shower/Tub: Tub/Shower unit(Simultaneous filing. User may not have seen previous data.)    Bathroom Equipment: Grab bars in shower, Shower chair    Home Equipment: Rolling walker, [address] bed    ADL Assistance: Needs assistance    Homemaking Assistance: Needs assistance    Homemaking Responsibilities: No    Ambulation Assistance: Independent    Transfer Assistance: Independent    Active : No    Additional Comments: Pt wears orthopedic shoes at baseline    The patient states she is current with [facility](RN per the ). The patient had a walker, but obtained a hospital bed, wheel chair, BSC and O2 last admission (from [address] [place] [address]). pharmacy is [address] [place] [address],[address] [address]., [facility]. Transportation is provided by  Home	[place] () per the patient     Social Determinants of Health     Financial Resource Strain:     Difficulty of Paying Living Expenses: Not on file   Food Insecurity:     Worried About [address] in the Last Year: Not on file    Shayy of Food in the Last Year: Not on file   Transportation Needs:     Lack of Transportation (Medical): Not on file    Lack of Transportation (Non-Medical):  Not on file   Physical Activity:     Days of Exercise per Week: Not on file    Minutes of Exercise per Session: Not on file   Stress:     Feeling of Stress : Not on file   Social Connections:     Frequency of Communication with Friends and Family: Not on file    Frequency of Social Gatherings with Friends and Family: Not on file    Attends Yarsanism Services: Not on file    Active Member of Clubs or Organizations: Not on file    Attends Club or Organization Meetings: Not on file    Marital Status: Not on file   Intimate Partner Violence:     Fear of Current or Ex-Partner: Not on file    Emotionally Abused: Not on file    Physically Abused: Not on file    Sexually Abused: Not on file   Housing Stability:     Unable to Pay for Housing in the Last Year: Not on file    Number of [place] in the Last Year: Not on file    Unstable Housing in the Last Year: Not on file     No family history on file. Allergies   Allergen Reactions    Ambien [Zolpidem Tartrate]     Aripiprazole     Capoten [Captopril]     Clioquinol     Clonazepam      Other reaction(s): Palpitations    Cogentin [Benztropine]     Depakote [Divalproex Sodium]     Effexor Xr [Venlafaxine Hcl Er]     Geodon [Ziprasidone Hcl]     Lisinopril      Hyperkalemia: 4/21/20 potassium was 6.7    Lyrica [Pregabalin]     Navane [Thiothixene]     Pamelor [Nortriptyline Hcl]     Remeron [Mirtazapine]     Risperdal [Risperidone]     Seroquel [Quetiapine]     Trazodone And Nefazodone     Wellbutrin [Bupropion]     Ziprasidone      Other reaction(s): Other, Sleeplessness    Zolpidem      Other reaction(s): Insomnia, Other         Review of Systems   All other systems reviewed and are negative. Objective:       Physical Exam  Vitals reviewed. Constitutional:       General: She is awake. She is not in acute distress. Appearance: She is well-developed. She is morbidly obese. She is ill-appearing. She is not toxic-appearing or diaphoretic.       Interventions: Nasal cannula in place.   HENT:      Head: Normocephalic and atraumatic. Eyes:      General: No scleral icterus. Conjunctiva/sclera: Conjunctivae normal.      Pupils: Pupils are equal, round, and reactive to light. Cardiovascular:      Rate and Rhythm: Normal rate and regular rhythm. Pulses: Normal pulses. Heart sounds: Murmur heard. Pulmonary:      Effort: Pulmonary effort is normal. No respiratory distress. Breath sounds: Normal breath sounds. No wheezing or rales. Chest:      Chest wall: No tenderness. Abdominal:      General: Bowel sounds are normal. There is no distension. Palpations: Abdomen is soft. There is no mass. Tenderness: There is no abdominal tenderness. There is no guarding or rebound. Genitourinary:     Comments: Colon-clear yellow urine noted  Musculoskeletal:         General: Normal range of motion. Cervical back: Normal range of motion and neck supple. Right lower le+ Edema present. Left lower le+ Edema present. Lymphadenopathy:      Cervical: No cervical adenopathy. Skin:     General: Skin is warm and dry. Coloration: Skin is not cyanotic, jaundiced, mottled or pale. Findings: Bruising present. No erythema or rash. Neurological:      Mental Status: She is alert and oriented to person, place, and time. Mental status is at baseline. Motor: Weakness (global) present. Psychiatric:         Mood and Affect: Mood normal.         Speech: Speech normal.         Behavior: Behavior normal. Behavior is cooperative. Thought Content: Thought content normal.         Cognition and Memory: Cognition normal. Memory is impaired (forgetful ). Judgment: Judgment normal.       Assessment:       Diagnosis Orders   1. Vaginal yeast infection     2. Bilateral leg edema     3. Hyponatremia           Plan:   1.   Diflucan 150 mg p.o. x1, then repeat in 72 hours  -Colon/ijeoma Care twice daily and as needed  -Nystatin powder under skin folds, especially pannus. 2.  Continue with current POC. -Ace wraps to bilateral extremities, off at at bedtime  -elevate  bilateral lower extremities while at rest  -Keep follow-up with cardiology  3. Light decline in sodium, resident is in NAD and she is alert and oriented at her baseline. Normal osmolality pending. Will add TSH.  -monitor for any acute changes, especially neurologic. No follow-ups on file. Side effects, adverse effects of the medication prescribed today, as well as treatment plan and result expectations have beendiscussed with the patient who expresses understanding and desires to proceed.     Gely Pro, APRN - CNP

## 2022-05-17 NOTE — ED PROVIDER NOTES
[facility] ED  eMERGENCYdEPARTMENT eNCOUnter      Pt Name: Leann Salguero  MRN: [id number]  Medinagflisa 1955  Date of evaluation: 2022  Dom Muir MD    CHIEF COMPLAINT           HPI  Leann Salguero is a 61 y.o. female per chart review has a h/o CKD, CAD, asthma, DM II, HTN, hpl, PAD, PVD presents to the ED with ab pain. Pt notes gradual onset, severe, constant, sharp, RUQ ab pain x 4 days. +N/v.  Pt denies fever, cp, sob, dysuria, hematuria, diarrhea. Pt recently admitted to the hospital for acute cholecystitis and had a laparoscopic cholecystectomy. ROS  Review of Systems   Constitutional: Negative for activity change, chills and fever. HENT: Negative for ear pain and sore throat. Eyes: Negative for visual disturbance. Respiratory: Negative for cough and shortness of breath. Cardiovascular: Negative for chest pain, palpitations and leg swelling. Gastrointestinal: Positive for abdominal pain, nausea and vomiting. Negative for diarrhea. Genitourinary: Negative for dysuria. Musculoskeletal: Negative for back pain. Skin: Negative for rash. Neurological: Negative for dizziness and weakness. Except as noted above the remainder of the review of systems was reviewed and negative.        PAST MEDICAL HISTORY     Past Medical History:   Diagnosis Date    Asthma     CAD (coronary artery disease)     CKD (chronic kidney disease) stage 3, GFR 30-59 ml/min [facility]     Colitis     Diabetes mellitus ([address]     Hyperlipidemia     Hypertension     PAD (peripheral artery disease) ([facility])     Prolonged emergence from general anesthesia     PVD (peripheral vascular disease) ([address]     Thyroid disease          SURGICAL HISTORY       Past Surgical History:   Procedure Laterality Date    CARDIAC SURGERY      CATARACT REMOVAL WITH IMPLANT Bilateral 11- 11-     SECTION      CHOLECYSTECTOMY, LAPAROSCOPIC N/A 2022    LAPAROSCOPIC CHOLECYSTECTOMY performed by Amy Dickson MD at [address] [facility] [address] 8/20/2019    COLONOSCOPY DIAGNOSTIC performed by Janny Gonsales MD at [facility]  06/2019    unknown vessels    HYSTERECTOMY      TOE AMPUTATION Right     3rd toe         CURRENTMEDICATIONS       Previous Medications    ALBUTEROL (PROVENTIL) (2.5 MG/3ML) 0.083% NEBULIZER SOLUTION    Take 3 mLs by nebulization every 6 hours as needed for Wheezing    ALBUTEROL SULFATE HFA (VENTOLIN HFA) 108 (90 BASE) MCG/ACT INHALER    Inhale 2 puffs into the lungs as needed for Wheezing Every 4-6 hours PRN    AMMONIUM LACTATE (LAC-HYDRIN) 12 % LOTION    Apply topically daily as needed for Dry Skin     ARTIFICIAL TEARS 1.4 % OPHTHALMIC SOLUTION    Place 1 drop into both eyes in the morning and at bedtime     ASPIRIN 81 MG EC TABLET    Take 1 tablet by mouth daily    ATORVASTATIN (LIPITOR) 80 MG TABLET    Take 80 mg by mouth nightly Indications: High Amount of Fats in the Blood     BUSPIRONE (BUSPAR) 10 MG TABLET    Take 10 mg by mouth in the morning and at bedtime Indications: Schizophrenia     CARVEDILOL (COREG) 6.25 MG TABLET    Take 6.25 mg by mouth 2 times daily (with meals) Indications: High Blood Pressure Disorder    CPAP MACHINE MISC    by Does not apply route New CPAP with 10 cm    CYANOCOBALAMIN (VITAMIN B-12) 1000 MCG EXTENDED RELEASE TABLET    Take 1,000 mcg by mouth daily Indications: Anemia     DICLOFENAC SODIUM (VOLTAREN) 1 % GEL    Apply 2 g topically Indications: Pain     DICYCLOMINE (BENTYL) 10 MG CAPSULE    Take 1 capsule by mouth every 6 hours as needed (cramps)    DOCUSATE SODIUM (COLACE, DULCOLAX) 100 MG CAPS    Take 100 mg by mouth 2 times daily    DOXEPIN (SINEQUAN) 25 MG CAPSULE    Take 25 mg by mouth nightly Indications: Depression     DULAGLUTIDE (TRULICITY) 3.07 RI/0.7ZH SOPN    Inject 0.75 mg into the skin once a week    EMOLLIENT (EUCERIN INTENSIVE REPAIR HAND) 2.5-10 % CREA APPLY TO FEET AT BEDTIME; PLACE SOCKS OVER FEET AFTER APPLICATION IF NEEDED FOR DRY CRACKING FEET    FOLIC ACID (FOLVITE) 1 MG TABLET    Take 1 mg by mouth daily Indications: Anemia     FUROSEMIDE (LASIX) 20 MG TABLET    Take 20 mg by mouth every morning (before breakfast)    FUROSEMIDE (LASIX) 40 MG TABLET    Take 40 mg by mouth every morning    GABAPENTIN (NEURONTIN) 600 MG TABLET    Take 600 mg by mouth 2 times daily. Indications: Nerve Disease     HALOPERIDOL (HALDOL) 5 MG TABLET    Take 5 mg by mouth nightly Indications: Schizophrenia     HYDRALAZINE (APRESOLINE) 50 MG TABLET    Take 1 tablet by mouth every 8 hours Hold if SBP < 120    HYDROXYZINE (VISTARIL) 25 MG CAPSULE    Take 50 mg by mouth 3 times daily as needed for Anxiety     INSULIN GLARGINE (LANTUS SOLOSTAR) 100 UNIT/ML INJECTION PEN    60 units at bedtime    INSULIN LISPRO, 1 UNIT DIAL, (HUMALOG KWIKPEN) 100 UNIT/ML SOPN    15  units at each meals    IRON POLYSACCARIDE (FERREX 150 FORTE PLUS)  MG CAPS    Take 1 capsule by mouth daily    IRON POLYSACCH [id number] (NIFEREX-150 FORTE PO)    Take 1 tablet by mouth daily     ISOSORBIDE DINITRATE (ISORDIL) 20 MG TABLET    Take 1 tablet by mouth 3 times daily    LEVOTHYROXINE (SYNTHROID) 50 MCG TABLET    take 1 tablet by mouth once daily    MECLIZINE (ANTIVERT) 25 MG TABLET    Take 25 mg by mouth 2 times daily Indications: Sensation of Spinning or Whirling     MONTELUKAST (SINGULAIR) 10 MG TABLET    Take 1 tablet by mouth nightly    NEOMYCIN-POLYMYXIN-DEXAMETH (MAXITROL) 3.5-11908-5.1 OPHTHALMIC SUSPENSION    Place 1 drop into both eyes 4 times daily Indications: Glaucoma     NITROGLYCERIN (NITROSTAT) 0.4 MG SL TABLET    up to max of 3 total doses. If no relief after 1 dose, call 911.     NUTRITIONAL SUPPLEMENTS (GLUCERNA SHAKE) LIQD    take as directed three times a day    ONDANSETRON (ZOFRAN ODT) 4 MG DISINTEGRATING TABLET    Take 1 tablet by mouth every 8 hours as needed for Nausea    OXYBUTYNIN (DITROPAN-XL) 5 MG EXTENDED RELEASE TABLET    take 1 tablet by mouth once daily    OXYGEN    2 lit O2 with sleep , please give O2 concentrator    POTASSIUM CHLORIDE (KLOR-CON M) 20 MEQ EXTENDED RELEASE TABLET    Take 1 tablet by mouth daily    PRAZOSIN (MINIPRESS) 2 MG CAPSULE    Take 2 mg by mouth nightly Indications: High Blood Pressure Disorder     RANOLAZINE (RANEXA) 1000 MG EXTENDED RELEASE TABLET    Take 1 tablet by mouth 2 times daily    RESTASIS 0.05 % OPHTHALMIC EMULSION    Place 1 drop into both eyes nightly Indications: Dry Eyes     SERTRALINE (ZOLOFT) 100 MG TABLET    Take 100 mg by mouth daily Indications: Depression     TICAGRELOR (BRILINTA) 90 MG TABS TABLET    Take 90 mg by mouth 2 times daily Indications: Diabetes        ALLERGIES     Ambien [zolpidem tartrate], Aripiprazole, Capoten [captopril], Clioquinol, Clonazepam, Cogentin [benztropine], Depakote [divalproex sodium], Effexor xr [venlafaxine hcl er], Geodon [ziprasidone hcl], Lisinopril, Lyrica [pregabalin], Navane [thiothixene], Pamelor [nortriptyline hcl], Remeron [mirtazapine], Risperdal [risperidone], Seroquel [quetiapine], Trazodone and nefazodone, Wellbutrin [bupropion], Ziprasidone, and Zolpidem    FAMILY HISTORY     History reviewed. No pertinent family history.        SOCIAL HISTORY       Social History     Socioeconomic History    Marital status:      Spouse name: None    Number of children: None    Years of education: None    Highest education level: None   Occupational History    None   Tobacco Use    Smoking status: Never Smoker    Smokeless tobacco: Never Used   Vaping Use    Vaping Use: Never used   Substance and Sexual Activity    Alcohol use: Never    Drug use: Never    Sexual activity: None   Other Topics Concern    None   Social History Narrative         Lives With: Spouse    Type of Home: [address] [place] [address] apt [facility] in [address]: One level    Home Access: [address] [place] [address] Shower/Tub: Tub/Shower unit(Simultaneous filing. User may not have seen previous data.)    Bathroom Equipment: Grab bars in shower, Shower chair    Home Equipment: Rolling walker, [address] bed    ADL Assistance: Needs assistance    Homemaking Assistance: Needs assistance    Homemaking Responsibilities: No    Ambulation Assistance: Independent    Transfer Assistance: Independent    Active : No    Additional Comments: Pt wears orthopedic shoes at baseline    The patient states she is current with [facility](RN per the ). The patient had a walker, but obtained a hospital bed, wheel chair, BSC and O2 last admission (from [address]). pharmacy is [address] [place] [address],[address] [address]., [facility]. Transportation is provided by [employer]	[place] () per the patient     Social Determinants of Health     Financial Resource Strain:     Difficulty of Paying Living Expenses: Not on file   Food Insecurity:     Worried About [address] in the Last Year: Not on file    Shayy of Food in the Last Year: Not on file   Transportation Needs:     Lack of Transportation (Medical): Not on file    Lack of Transportation (Non-Medical):  Not on file   Physical Activity:     Days of Exercise per Week: Not on file    Minutes of Exercise per Session: Not on file   Stress:     Feeling of Stress : Not on file   Social Connections:     Frequency of Communication with Friends and Family: Not on file    Frequency of Social Gatherings with Friends and Family: Not on file    Attends Lutheran Services: Not on file    Active Member of Clubs or Organizations: Not on file    Attends Club or Organization Meetings: Not on file    Marital Status: Not on file   Intimate Partner Violence:     Fear of Current or Ex-Partner: Not on file    Emotionally Abused: Not on file    Physically Abused: Not on file    Sexually Abused: Not on file   Housing Stability:     Unable to Pay for Housing in the Last Year: Not on file    Number of Places Lived in the Last Year: Not on file    Unstable Housing in the Last Year: Not on file         PHYSICAL EXAM       ED Triage Vitals   BP Temp Temp src Pulse Resp SpO2 Height Weight   -- -- -- -- -- -- -- --       Physical Exam  Vitals and nursing note reviewed. Constitutional:       Appearance: She is well-developed. HENT:      Head: Normocephalic. Right Ear: External ear normal.      Left Ear: External ear normal.   Eyes:      Conjunctiva/sclera: Conjunctivae normal.      Pupils: Pupils are equal, round, and reactive to light. Cardiovascular:      Rate and Rhythm: Normal rate and regular rhythm. Heart sounds: Normal heart sounds. Pulmonary:      Effort: Pulmonary effort is normal.      Breath sounds: Normal breath sounds. Abdominal:      General: Bowel sounds are normal. There is no distension. Palpations: Abdomen is soft. Tenderness: There is abdominal tenderness in the right upper quadrant and epigastric area. There is no guarding or rebound. Musculoskeletal:         General: Normal range of motion. Cervical back: Normal range of motion and neck supple. Skin:     General: Skin is warm and dry. Neurological:      Mental Status: She is alert and oriented to person, place, and time. Psychiatric:         Mood and Affect: Mood normal.           MDM  62 yo female presents to the ED with ab pain, n/v.  Pt is afebrile, hemodynamically stable. Pt given 1 L NS, IV morphine, IV zofran, IV toradol, IV pepcid with moderate relief. Labs remarkable for Na 129, BUN 28, Cr 1.35 (baseline), alk phos 136, AST 64, ALT 40, Hb 8.8 (baseline), troponin 0.082. UA shows UTI. Blood cultures drawn and pt given IV rocephin in the ED. CT AP shows small amount of free fluid in upper abdomen and soft tissue induration in abdominal wall. Case discussed with Dr. Valeria Pedroza (surgery) and we both believed that patient was stable to go home. Low suspicion for biloma as pt's bilirubin is normal.  Pt with chronically elevated troponin. Pt reassessed and feels better. Pt able to tolerate PO. Pt given prescription for cipro for UTI, percocet, zofran, phenergan. Pt given ab pain, n/v warning signs and will f/u with pcp and surgery outpatient. FINAL IMPRESSION      1. Abdominal pain, unspecified abdominal location    2. Acute cystitis without hematuria    3.  Non-intractable vomiting with nausea, unspecified vomiting type          DISPOSITION/PLAN   DISPOSITION Decision To Discharge 05/17/2022 02:53:24 PM        DISCHARGE MEDICATIONS:  [unfilled]         Chet Oscar MD(electronically signed)  Attending Emergency Physician           Chet Oscar MD  05/17/22 1156

## 2022-05-17 NOTE — ED NOTES
Dr. Haleigh Gomez at bedside w/ to discuss d/c with pt's daughter.      Martha Jarrett RN  05/17/22 [id number]

## 2022-05-17 NOTE — ED TRIAGE NOTES
Pt presents to ED from [facility] with c/o RUQ abdominal pain. Pt reports that pain has been present x4 days and associated with n/v.   Pt denies fever, chills, diarrhea. Pt is on 4L O2 continuously. Pt had cholecystectomy on 04/25 w/Dr. Bahman Thapa. Upon assessment, pt is A/Ox4, skin p/w/d, resp even and unlabored, msp's intact. Pt denies cp, sob.

## 2022-05-18 PROBLEM — N17.9 AKI (ACUTE KIDNEY INJURY) (HCC): Status: ACTIVE | Noted: 2022-01-01

## 2022-05-18 NOTE — ED TRIAGE NOTES
Pt [facility] with c/o hypoglycemia   Pt squad [facility] states BGL was 62 they gave her 15 of glucagon   Blood in urine (park cath)   Pt has daughter at bedside   Pt wears 4 liters of oxygen   Pt Nepali speaking only

## 2022-05-18 NOTE — ED PROVIDER NOTES
[address] [facility] ED  EMERGENCY DEPARTMENT ENCOUNTER      Pt Name: Juani Blakely  MRN: [id number]  [place] 1955  Date of evaluation: 5/18/2022  Provider: Diane Fothergill, DO    CHIEF COMPLAINT       Chief Complaint   Patient presents with    Hypoglycemia     PER SQUAD          HISTORY OF PRESENT ILLNESS   (Location/Symptom, Timing/Onset, Context/Setting, Quality, Duration, Modifying Factors, Severity)  Note limiting factors. Juani Blakely is a 61 y.o. female who presents to the emergency department . Patient brought in from nursing home because her blood sugar was 62 and daughter noticed a lot of blood in her Colon catheter. Patient was seen here yesterday and started on Cipro for urinary tract infection. Patient had blood in her urine yesterday. Daughter states that for the last few days she has not been very responsive. She sleeps a lot. HPI    Nursing Notes were reviewed. REVIEW OF SYSTEMS    (2-9 systems for level 4, 10 or more for level 5)     Review of Systems   Constitutional: Negative for activity change, appetite change and fatigue. HENT: Negative for congestion and sore throat. Eyes: Negative for pain and visual disturbance. Respiratory: Negative for chest tightness and shortness of breath. Cardiovascular: Negative for chest pain. Gastrointestinal: Negative for abdominal pain, nausea and vomiting. Endocrine: Negative for polydipsia. Genitourinary: Positive for hematuria. Negative for flank pain and urgency. Musculoskeletal: Negative for gait problem and neck stiffness. Skin: Negative for rash. Neurological: Negative for weakness, light-headedness and headaches. Psychiatric/Behavioral: Positive for decreased concentration. Negative for confusion and sleep disturbance. Except as noted above the remainder of the review of systems was reviewed and negative.        PAST MEDICAL HISTORY     Past Medical History:   Diagnosis Date    Asthma  CAD (coronary artery disease)     CKD (chronic kidney disease) stage 3, GFR 30-59 ml/min ([facility])     Colitis     Diabetes mellitus ([facility]     Hyperlipidemia     Hypertension     PAD (peripheral artery disease) ([facility])     Prolonged emergence from general anesthesia     PVD (peripheral vascular disease) ([facility]     Thyroid disease          SURGICAL HISTORY       Past Surgical History:   Procedure Laterality Date    CARDIAC SURGERY      CATARACT REMOVAL WITH IMPLANT Bilateral 11- 11-     SECTION      CHOLECYSTECTOMY, LAPAROSCOPIC N/A 2022    LAPAROSCOPIC CHOLECYSTECTOMY performed by Juancarlos Johnston MD at [address] [facility] 2019    COLONOSCOPY DIAGNOSTIC performed by Elisha Whipple MD at [address] [facility] GRAFT  2019    unknown vessels    HYSTERECTOMY      TOE AMPUTATION Right     3rd toe         CURRENT MEDICATIONS       Previous Medications    ALBUTEROL (PROVENTIL) (2.5 MG/3ML) 0.083% NEBULIZER SOLUTION    Take 3 mLs by nebulization every 6 hours as needed for Wheezing    ALBUTEROL SULFATE HFA (VENTOLIN HFA) 108 (90 BASE) MCG/ACT INHALER    Inhale 2 puffs into the lungs as needed for Wheezing Every 4-6 hours PRN    AMMONIUM LACTATE (LAC-HYDRIN) 12 % LOTION    Apply topically daily as needed for Dry Skin     ARTIFICIAL TEARS 1.4 % OPHTHALMIC SOLUTION    Place 1 drop into both eyes in the morning and at bedtime     ASPIRIN 81 MG EC TABLET    Take 1 tablet by mouth daily    ATORVASTATIN (LIPITOR) 80 MG TABLET    Take 80 mg by mouth nightly Indications: High Amount of Fats in the Blood     BUSPIRONE (BUSPAR) 10 MG TABLET    Take 10 mg by mouth in the morning and at bedtime Indications: Schizophrenia     CARVEDILOL (COREG) 6.25 MG TABLET    Take 6.25 mg by mouth 2 times daily (with meals) Indications: High Blood Pressure Disorder    CIPROFLOXACIN (CIPRO) 500 MG TABLET    Take 1 tablet by mouth 2 times daily for 10 days    CPAP MACHINE MISC    by Does not apply route New CPAP with 10 cm    CYANOCOBALAMIN (VITAMIN B-12) 1000 MCG EXTENDED RELEASE TABLET    Take 1,000 mcg by mouth daily Indications: Anemia     DICLOFENAC SODIUM (VOLTAREN) 1 % GEL    Apply 2 g topically Indications: Pain     DICYCLOMINE (BENTYL) 10 MG CAPSULE    Take 1 capsule by mouth every 6 hours as needed (cramps)    DOCUSATE SODIUM (COLACE, DULCOLAX) 100 MG CAPS    Take 100 mg by mouth 2 times daily    DOXEPIN (SINEQUAN) 25 MG CAPSULE    Take 25 mg by mouth nightly Indications: Depression     DULAGLUTIDE (TRULICITY) 3.64 SP/8.1TW SOPN    Inject 0.75 mg into the skin once a week    EMOLLIENT (EUCERIN INTENSIVE REPAIR HAND) 2.5-10 % CREA    APPLY TO FEET AT BEDTIME; PLACE SOCKS OVER FEET AFTER APPLICATION IF NEEDED FOR DRY CRACKING FEET    FOLIC ACID (FOLVITE) 1 MG TABLET    Take 1 mg by mouth daily Indications: Anemia     FUROSEMIDE (LASIX) 20 MG TABLET    Take 20 mg by mouth every morning (before breakfast)    FUROSEMIDE (LASIX) 40 MG TABLET    Take 40 mg by mouth every morning    GABAPENTIN (NEURONTIN) 600 MG TABLET    Take 600 mg by mouth 2 times daily.  Indications: Nerve Disease     HALOPERIDOL (HALDOL) 5 MG TABLET    Take 5 mg by mouth nightly Indications: Schizophrenia     HYDRALAZINE (APRESOLINE) 50 MG TABLET    Take 1 tablet by mouth every 8 hours Hold if SBP < 120    HYDROXYZINE (VISTARIL) 25 MG CAPSULE    Take 50 mg by mouth 3 times daily as needed for Anxiety     INSULIN GLARGINE (LANTUS SOLOSTAR) 100 UNIT/ML INJECTION PEN    60 units at bedtime    INSULIN LISPRO, 1 UNIT DIAL, (HUMALOG KWIKPEN) 100 UNIT/ML SOPN    15  units at each meals    IRON POLYSACCARIDE (FERREX 150 FORTE PLUS)  MG CAPS    Take 1 capsule by mouth daily    IRON POLYSACCH [id number] (NIFEREX-150 FORTE PO)    Take 1 tablet by mouth daily     ISOSORBIDE DINITRATE (ISORDIL) 20 MG TABLET    Take 1 tablet by mouth 3 times daily    LEVOTHYROXINE (SYNTHROID) 50 MCG TABLET    take 1 tablet by mouth once daily    MECLIZINE (ANTIVERT) 25 MG TABLET    Take 25 mg by mouth 2 times daily Indications: Sensation of Spinning or Whirling     MONTELUKAST (SINGULAIR) 10 MG TABLET    Take 1 tablet by mouth nightly    NEOMYCIN-POLYMYXIN-DEXAMETH (MAXITROL) 3.5-72539-4.1 OPHTHALMIC SUSPENSION    Place 1 drop into both eyes 4 times daily Indications: Glaucoma     NITROGLYCERIN (NITROSTAT) 0.4 MG SL TABLET    up to max of 3 total doses. If no relief after 1 dose, call 911. NUTRITIONAL SUPPLEMENTS (GLUCERNA SHAKE) LIQD    take as directed three times a day    ONDANSETRON (ZOFRAN-ODT) 4 MG DISINTEGRATING TABLET    Take 1 tablet by mouth 3 times daily as needed for Nausea or Vomiting    OXYBUTYNIN (DITROPAN-XL) 5 MG EXTENDED RELEASE TABLET    take 1 tablet by mouth once daily    OXYCODONE-ACETAMINOPHEN (PERCOCET) 5-325 MG PER TABLET    Take 1 tablet by mouth every 6 hours as needed for Pain for up to 3 days. Intended supply: 3 days.  Take lowest dose possible to manage pain    OXYGEN    2 lit O2 with sleep , please give O2 concentrator    POTASSIUM CHLORIDE (KLOR-CON M) 20 MEQ EXTENDED RELEASE TABLET    Take 1 tablet by mouth daily    PRAZOSIN (MINIPRESS) 2 MG CAPSULE    Take 2 mg by mouth nightly Indications: High Blood Pressure Disorder     RANOLAZINE (RANEXA) 1000 MG EXTENDED RELEASE TABLET    Take 1 tablet by mouth 2 times daily    RESTASIS 0.05 % OPHTHALMIC EMULSION    Place 1 drop into both eyes nightly Indications: Dry Eyes     SERTRALINE (ZOLOFT) 100 MG TABLET    Take 100 mg by mouth daily Indications: Depression     TICAGRELOR (BRILINTA) 90 MG TABS TABLET    Take 90 mg by mouth 2 times daily Indications: Diabetes        ALLERGIES     Ambien [zolpidem tartrate], Aripiprazole, Capoten [captopril], Clioquinol, Clonazepam, Cogentin [benztropine], Depakote [divalproex sodium], Effexor xr [venlafaxine hcl er], Geodon [ziprasidone hcl], Lisinopril, Lyrica [pregabalin], Navane [thiothixene], Pamelor [nortriptyline hcl], Remeron [mirtazapine], Risperdal [risperidone], Seroquel [quetiapine], Trazodone and nefazodone, Wellbutrin [bupropion], Ziprasidone, and Zolpidem    FAMILY HISTORY     History reviewed. No pertinent family history. SOCIAL HISTORY       Social History     Socioeconomic History    Marital status:      Spouse name: None    Number of children: None    Years of education: None    Highest education level: None   Occupational History    None   Tobacco Use    Smoking status: Never Smoker    Smokeless tobacco: Never Used   Vaping Use    Vaping Use: Never used   Substance and Sexual Activity    Alcohol use: Never    Drug use: Never    Sexual activity: None   Other Topics Concern    None   Social History Narrative         Lives With: Spouse    Type of Home: [address] [place] [address] [address]: One level    Home Access: Elevator    Bathroom Shower/Tub: Tub/Shower unit(Simultaneous filing. User may not have seen previous data.)    Bathroom Equipment: Grab bars in shower, Shower chair    Home Equipment: Rolling walker, [address] bed    ADL Assistance: Needs assistance    Homemaking Assistance: Needs assistance    Homemaking Responsibilities: No    Ambulation Assistance: Independent    Transfer Assistance: Independent    Active : No    Additional Comments: Pt wears orthopedic shoes at baseline    The patient states she is current with [facility](RN per the ). The patient had a walker, but obtained a hospital bed, wheel chair, BSC and O2 last admission (from [employer]). pharmacy is [address] [place] [address],[address] [address]., [facility].       Transportation is provided by [employer]	[place] () per the patient     Social Determinants of Health     Financial Resource Strain:     Difficulty of Paying Living Expenses: Not on file   Food Insecurity:     Worried About [address] in the Last Year: Not on file    [address] in the Last Year: Not on file   Transportation Needs:     Lack of Transportation (Medical): Not on file    Lack of Transportation (Non-Medical): Not on file   Physical Activity:     Days of Exercise per Week: Not on file    Minutes of Exercise per Session: Not on file   Stress:     Feeling of Stress : Not on file   Social Connections:     Frequency of Communication with Friends and Family: Not on file    Frequency of Social Gatherings with Friends and Family: Not on file    Attends Buddhism Services: Not on file    Active Member of [address] [place] [address] [employer] or Organizations: Not on file    Attends Club or Organization Meetings: Not on file    Marital Status: Not on file   Intimate Partner Violence:     Fear of Current or Ex-Partner: Not on file    Emotionally Abused: Not on file    Physically Abused: Not on file    Sexually Abused: Not on file   Housing Stability:     Unable to Pay for Housing in the Last Year: Not on file    Number of [place] in the Last Year: Not on file    Unstable Housing in the Last Year: Not on file       SCREENINGS        [place] Coma Scale  Eye Opening: Spontaneous  Best Verbal Response: Oriented  Best Motor Response: Obeys commands  [place] Coma Scale Score: 15               PHYSICAL EXAM    (up to 7 for level 4, 8 or more for level 5)     ED Triage Vitals [05/18/22 1211]   BP Temp Temp Source Pulse Resp SpO2 Height Weight   (!) 98/35 97.8 °F (36.6 °C) Oral 57 13 100 % 5' 4\" (1.626 m) 261 lb (118.4 kg)       Physical Exam  Vitals and nursing note reviewed. Constitutional:       General: She is not in acute distress. Appearance: She is well-developed. She is not diaphoretic. Comments: Lethargic   HENT:      Head: Normocephalic and atraumatic. Right Ear: External ear normal.      Left Ear: External ear normal.      Nose: Nose normal.      Mouth/Throat:      Mouth: Mucous membranes are moist.      Pharynx: No oropharyngeal exudate.    Eyes:      Conjunctiva/sclera: Conjunctivae normal.      Pupils: Pupils are equal, round, and reactive to light. Neck:      Thyroid: No thyromegaly. Vascular: No JVD. Trachea: No tracheal deviation. Cardiovascular:      Rate and Rhythm: Regular rhythm. Bradycardia present. Heart sounds: Normal heart sounds. No murmur heard. Pulmonary:      Effort: Pulmonary effort is normal. No respiratory distress. Breath sounds: Normal breath sounds. No wheezing. Abdominal:      General: Bowel sounds are normal.      Palpations: Abdomen is soft. Tenderness: There is no abdominal tenderness. There is no guarding. Musculoskeletal:         General: Normal range of motion. Cervical back: Normal range of motion and neck supple. Skin:     General: Skin is warm and dry. Findings: No rash. Neurological:      Mental Status: She is alert. Cranial Nerves: No cranial nerve deficit.       Comments: Very lethargic   Psychiatric:         Behavior: Behavior normal.         DIAGNOSTIC RESULTS     EKG: All EKG's are interpreted by the Emergency Department Physician who either signs or Co-signs this chart in the absence of a cardiologist.    Atrial fibrillation with slow ventricular response 54 bpm right bundle branch block    RADIOLOGY:   Non-plain film images such as CT, Ultrasound and MRI are read by the radiologist. Plain radiographic images are visualized and preliminarily interpreted by the emergency physician with the below findings:    CT abdomen pelvis without contrast pending    Interpretation per the Radiologist below, if available at the time of this note:    Archie 23    (Results Pending)         ED BEDSIDE ULTRASOUND:   Performed by ED Physician - none    LABS:  Labs Reviewed   CBC WITH AUTO DIFFERENTIAL - Abnormal; Notable for the following components:       Result Value    RBC 3.46 (*)     Hemoglobin 8.3 (*)     Hematocrit 25.6 (*)     MCV 73.9 (*)     MCH 24.0 (*)     MCHC 32.4 (*)     RDW 19.4 (*) Neutrophils Absolute 9.0 (*)     Lymphocytes Absolute 0.8 (*)     All other components within normal limits   COMPREHENSIVE METABOLIC PANEL - Abnormal; Notable for the following components:    Sodium 125 (*)     Potassium 6.4 (*)     Chloride 90 (*)     Anion Gap 6 (*)     Glucose 117 (*)     BUN 35 (*)     CREATININE 1.91 (*)     GFR Non- 26.4 (*)     GFR  32.0 (*)     Albumin 3.2 (*)     Total Bilirubin 0.8 (*)     Alkaline Phosphatase 147 (*)     ALT 93 (*)      (*)     Globulin 3.8 (*)     All other components within normal limits    Narrative:     CALL  Martinez  [facility] tel. [phone],  K results called to and read back by Chapito Mcclain, 05/18/2022 14:43, by [facility]   BASIC METABOLIC PANEL - Abnormal; Notable for the following components:    Sodium 126 (*)     Potassium 6.1 (*)     Chloride 92 (*)     BUN 35 (*)     CREATININE 2.11 (*)     GFR Non- 23.5 (*)     GFR  28.5 (*)     Calcium 8.2 (*)     All other components within normal limits    Narrative:     CALL  Martinez  [facility] tel. [phone],  K results called to and read back by Sabrina Corrales, 05/18/2022 16:08, by [facility]   POCT GLUCOSE - Normal   LACTIC ACID   MAGNESIUM    Narrative:     Anabelmariel Ramirez  ED tel. [phone],  K results called to and read back by Chapito Mcclian, 05/18/2022 14:43, by [address] [place] [address]       All other labs were within normal range or not returned as of this dictation. EMERGENCY DEPARTMENT COURSE and DIFFERENTIAL DIAGNOSIS/MDM:   Vitals:    Vitals:    05/18/22 1545 05/18/22 1600 05/18/22 1645 05/18/22 1700   BP:  108/66  (!) 117/59   Pulse: 53 54 59 58   Resp: 17 14 17 14   Temp:       TempSrc:       SpO2: 100% 100% 100% 100%   Weight:       Height:           Patient brought in for hypoglycemia and hematuria. Patient did have gross hematuria in her Colon bag. New Colon placed. Patient started on Cipro yesterday.   Patient found to have hyperkalemia and worsening renal insufficiency. Patient given IV fluids. Patient is sleeping a lot but does not have a acute focal exam.  History of CVA. CT renal pending. Will get CT of the brain as well admit to the hospitalist    [facility]      [address] time was 30 minutes, excluding separately reportable procedures. There was a high probability of clinically significant/life threatening deterioration in the patient's condition which required my urgent intervention. CONSULTS:  None    PROCEDURES:  Unless otherwise noted below, none     Procedures        FINAL IMPRESSION      1. Lethargy    2. RADHA (acute kidney injury) ([facility]    3. Hemorrhagic cystitis    4. Altered mental status, unspecified altered mental status type          DISPOSITION/PLAN   DISPOSITION Decision To Admit 05/18/2022 05:51:25 PM      PATIENT REFERRED TO:  No follow-up provider specified. DISCHARGE MEDICATIONS:  New Prescriptions    No medications on file     Controlled Substances Monitoring:     No flowsheet data found.     (Please note that portions of this note were completed with a voice recognition program.  Efforts were made to edit the dictations but occasionally words are mis-transcribed.)    Gómez Neri DO (electronically signed)  Attending Emergency Physician           Gómez Neri DO  05/18/22 2388

## 2022-05-18 NOTE — H&P
[place] [address] MEDICINE    HISTORY AND PHYSICAL EXAM    PATIENT NAME:  Disha Hankins    MRN:  [id number]  SERVICE DATE:  2022   SERVICE TIME:  5:57 PM    Primary Care Physician: Roseann Dolan     SUBJECTIVE  CHIEF COMPLAINT:  hypoglycemia    HPI:  Disha Hankins is a 61 y.o.,  female who has PMH of asthma, CAD s/p CABG, CHF, CKD3, CVA, DM2, HPL, HTN, hypothyroidism, depression, and schizophrenia, and morbid obesity presented from NH with hypoglycemia. Glucose was 62. Daughter was concerned about hematuria in the park bag. Reports the hematuria began yesterday. She has been on ciprofloxacin for UTI since yesterday and she was sent back to NH. CT abdomen and pelvis was completed yesterday in ED which showed small amount free fluid in upper abdomen and abdominal wall. Was cleared by general surgery. In the ED today, labs and imaging completed. Labs remarkable for RADHA and with potassium of 6.4; ; creat 2.11; and increase in liver enzymes. Received 1lNS, ca gluconate, dextrose, insulin and NA bicarb. Upon exam, patient lethargic but follows simple commands. Admitted for further work-up.      PAST MEDICAL HISTORY:    Past Medical History:   Diagnosis Date    Asthma     CAD (coronary artery disease)     CKD (chronic kidney disease) stage 3, GFR 30-59 ml/min ([facility])     Colitis     Diabetes mellitus ([facility]     Hyperlipidemia     Hypertension     PAD (peripheral artery disease) ([facility])     Prolonged emergence from general anesthesia     PVD (peripheral vascular disease) ([facility]     Thyroid disease      PAST SURGICAL HISTORY:    Past Surgical History:   Procedure Laterality Date    CARDIAC SURGERY      CATARACT REMOVAL WITH IMPLANT Bilateral 11- 11-     SECTION      CHOLECYSTECTOMY, LAPAROSCOPIC N/A 2022    LAPAROSCOPIC CHOLECYSTECTOMY performed by Joanie Benson MD at [facility] N/A 2019    COLONOSCOPY DIAGNOSTIC performed by Diana Brewster Tenisha Rodriguez MD at [facility] GRAFT  06/2019    unknown vessels    HYSTERECTOMY      TOE AMPUTATION Right     3rd toe     FAMILY HISTORY:  History reviewed. No pertinent family history. SOCIAL HISTORY:    Social History     Socioeconomic History    Marital status:      Spouse name: Not on file    Number of children: Not on file    Years of education: Not on file    Highest education level: Not on file   Occupational History    Not on file   Tobacco Use    Smoking status: Never Smoker    Smokeless tobacco: Never Used   Vaping Use    Vaping Use: Never used   Substance and Sexual Activity    Alcohol use: Never    Drug use: Never    Sexual activity: Not on file   Other Topics Concern    Not on file   Social History Narrative         Lives With: Spouse    Type of Home: [address] [place] [address] [address]: One level    Home Access: Elevator    Bathroom Shower/Tub: Tub/Shower unit(Simultaneous filing. User may not have seen previous data.)    Bathroom Equipment: Grab bars in shower, Shower chair    Home Equipment: Rolling walker, [address] 450 bed    ADL Assistance: Needs assistance    Homemaking Assistance: Needs assistance    Homemaking Responsibilities: No    Ambulation Assistance: Independent    Transfer Assistance: Independent    Active : No    Additional Comments: Pt wears orthopedic shoes at baseline    The patient states she is current with [facility](RN per the ). The patient had a walker, but obtained a hospital bed, wheel chair, BSC and O2 last admission (from [address] [place] [address] Road). pharmacy is [address] [place] [address],[address] [address]., [facility].       Transportation is provided by [employer]	[place] () per the patient     Social Determinants of Health     Financial Resource Strain:     Difficulty of Paying Living Expenses: Not on file   Food Insecurity:     Worried About [facility] in the Last Year: Not on file    Ran Out of Food in the Last Year: Not on file   Transportation Needs:     Lack of Transportation (Medical): Not on file    Lack of Transportation (Non-Medical): Not on file   Physical Activity:     Days of Exercise per Week: Not on file    Minutes of Exercise per Session: Not on file   Stress:     Feeling of Stress : Not on file   Social Connections:     Frequency of Communication with Friends and Family: Not on file    Frequency of Social Gatherings with Friends and Family: Not on file    Attends [employer] Services: Not on file    Active Member of [address] [place] [address] or Organizations: Not on file    Attends Club or Organization Meetings: Not on file    Marital Status: Not on file   Intimate Partner Violence:     Fear of Current or Ex-Partner: Not on file    Emotionally Abused: Not on file    Physically Abused: Not on file    Sexually Abused: Not on file   Housing Stability:     Unable to Pay for Housing in the Last Year: Not on file    Number of [place] in the Last Year: Not on file    Unstable Housing in the Last Year: Not on file     MEDICATIONS:   Prior to Admission medications    Medication Sig Start Date End Date Taking? Authorizing Provider   oxyCODONE-acetaminophen (PERCOCET) 5-325 MG per tablet Take 1 tablet by mouth every 6 hours as needed for Pain for up to 3 days. Intended supply: 3 days.  Take lowest dose possible to manage pain 5/17/22 5/20/22  Savanna Tony MD   ondansetron (ZOFRAN-ODT) 4 MG disintegrating tablet Take 1 tablet by mouth 3 times daily as needed for Nausea or Vomiting 5/17/22   Savanna Tony MD   ciprofloxacin (CIPRO) 500 MG tablet Take 1 tablet by mouth 2 times daily for 10 days 5/17/22 5/27/22  Savanna Tony MD   iron polysaccaride (FERREX 150 FORTE PLUS)  MG CAPS Take 1 capsule by mouth daily    Historical Provider, MD   furosemide (LASIX) 20 MG tablet Take 20 mg by mouth every morning (before breakfast)    Historical Provider, MD   furosemide (LASIX) 40 MG tablet Take 40 mg by mouth every morning    Historical Provider, MD   carvedilol (COREG) 6.25 MG tablet Take 6.25 mg by mouth 2 times daily (with meals) Indications: High Blood Pressure Disorder    Historical Provider, MD   hydrALAZINE (APRESOLINE) 50 MG tablet Take 1 tablet by mouth every 8 hours Hold if SBP < 120  Patient taking differently: Take 50 mg by mouth every 8 hours Indications: High Blood Pressure Disorder Hold if SBP < 120 5/4/22   Dorothy Sage MD   potassium chloride (KLOR-CON M) 20 MEQ extended release tablet Take 1 tablet by mouth daily  Patient taking differently: Take 20 mEq by mouth daily Indications: Nutritional Support  5/4/22   Dorothy Sage MD   dicyclomine (BENTYL) 10 MG capsule Take 1 capsule by mouth every 6 hours as needed (cramps)  Patient taking differently: Take 10 mg by mouth every 6 hours as needed (cramps) Indications: Cramping Pain in the Abdomen  4/20/22   JAI HintonC   albuterol sulfate HFA (VENTOLIN HFA) 108 (90 Base) MCG/ACT inhaler Inhale 2 puffs into the lungs as needed for Wheezing Every 4-6 hours PRN  Patient taking differently: Inhale 2 puffs into the lungs as needed for Wheezing Indications: Chronic Obstructive Lung Disease Every 4-6 hours PRN  4/11/22   Jose David Fuentes MD   albuterol (PROVENTIL) (2.5 MG/3ML) 0.083% nebulizer solution Take 3 mLs by nebulization every 6 hours as needed for Wheezing 4/11/22 5/11/22  Jose David Fuentes MD   montelukast (SINGULAIR) 10 MG tablet Take 1 tablet by mouth nightly  Patient taking differently: Take 10 mg by mouth nightly Indications: Seasonal Allergy  4/11/22   Jose David Fuentes MD   insulin glargine (LANTUS SOLOSTAR) 100 UNIT/ML injection pen 60 units at bedtime  Patient taking differently: Inject 60 Units into the skin nightly Indications: Type 2 Diabetes 60 units at bedtime 3/30/22   Roxana Leal MD   insulin lispro, 1 Unit Dial, (HUMALOG KWIKPEN) 100 UNIT/ML SOPN 15  units at each meals  Patient taking differently: Inject 15 Units into the skin 3 times daily (before meals) Indications: Type 2 Diabetes 15  units at each meals 3/30/22   Sadaf Cardoza MD   Dulaglutide (TRULICITY) 5.18 MG/9.7ND SOPN Inject 0.75 mg into the skin once a week  Patient taking differently: Inject 0.75 mg into the skin once a week Indications: Type 2 Diabetes  3/30/22   Sadaf Cardoza MD   levothyroxine (SYNTHROID) 50 MCG tablet take 1 tablet by mouth once daily  Patient taking differently: Take 50 mcg by mouth Daily Indications: Underactive Thyroid take 1 tablet by mouth once daily 3/30/22   Sadaf Cardoza MD   oxybutynin (DITROPAN-XL) 5 MG extended release tablet take 1 tablet by mouth once daily  Patient taking differently: Take 5 mg by mouth daily Indications: Bladder Spasm  2/23/22   Delfino Mckeon MD   busPIRone (BUSPAR) 10 MG tablet Take 10 mg by mouth in the morning and at bedtime Indications: Schizophrenia  12/29/21   Historical Provider, MD   prazosin (MINIPRESS) 2 MG capsule Take 2 mg by mouth nightly Indications: High Blood Pressure Disorder  12/21/21   Historical Provider, MD   ammonium lactate (LAC-HYDRIN) 12 % lotion Apply topically daily as needed for Dry Skin  6/23/21   Historical Provider, MD   atorvastatin (LIPITOR) 80 MG tablet Take 80 mg by mouth nightly Indications: High Amount of Fats in the Blood  8/6/21   Historical Provider, MD   diclofenac sodium (VOLTAREN) 1 % GEL Apply 2 g topically Indications: Pain  8/22/21   Historical Provider, MD   doxepin (SINEQUAN) 25 MG capsule Take 25 mg by mouth nightly Indications: Depression  8/6/21   Historical Provider, MD   ranolazine (RANEXA) 1000 MG extended release tablet Take 1 tablet by mouth 2 times daily  Patient taking differently: Take 1,000 mg by mouth 2 times daily Indications: Angina Pectoris  8/25/21   Malathi Lozano MD   gabapentin (NEURONTIN) 600 MG tablet Take 600 mg by mouth 2 times daily.  Indications: Nerve Disease     Historical Provider, MD RESTASIS 0.05 % ophthalmic emulsion Place 1 drop into both eyes nightly Indications: Dry Eyes  3/8/21   Historical Provider, MD   neomycin-polymyxin-dexameth (MAXITROL) 3.5-49821-2.1 ophthalmic suspension Place 1 drop into both eyes 4 times daily Indications: Glaucoma   Patient not taking: Reported on 5/12/2022 1/5/21   Historical Provider, MD   ARTIFICIAL TEARS 1.4 % ophthalmic solution Place 1 drop into both eyes in the morning and at bedtime  3/8/21   Historical Provider, MD   docusate sodium (COLACE, DULCOLAX) 100 MG CAPS Take 100 mg by mouth 2 times daily  Patient taking differently: Take 200 mg by mouth nightly Indications: Constipation At bedtime.  10/3/20   Florian Kraft MD   sertraline (ZOLOFT) 100 MG tablet Take 100 mg by mouth daily Indications: Depression  9/14/20   Historical Provider, MD   ticagrelor (BRILINTA) 90 MG TABS tablet Take 90 mg by mouth 2 times daily Indications: Diabetes     Historical Provider, MD   CPAP Machine MISC by Does not apply route New CPAP with 10 cm 8/20/20   Chidi Munguia MD   aspirin 81 MG EC tablet Take 1 tablet by mouth daily  Patient taking differently: Take 81 mg by mouth daily Indications: Peripheral Vascular Disease  6/30/20   Rosa Isela Martin MD   OXYGEN 2 lit O2 with sleep , please give O2 concentrator 6/12/20   Chidi Munguia MD   Emollient (EUCERIN INTENSIVE REPAIR HAND) 2.5-10 % CREA APPLY TO FEET AT BEDTIME; PLACE SOCKS OVER FEET AFTER APPLICATION IF NEEDED FOR DRY CRACKING FEET 3/15/20   Historical Provider, MD   hydrOXYzine (VISTARIL) 25 MG capsule Take 50 mg by mouth 3 times daily as needed for Anxiety  3/4/20   Historical Provider, MD   Nutritional Supplements ([address]) LIQD take as directed three times a day 6/1/20   Historical Provider, MD   isosorbide dinitrate (ISORDIL) 20 MG tablet Take 1 tablet by mouth 3 times daily  Patient taking differently: Take 20 mg by mouth 3 times daily Indications: Schizophrenia  4/28/20   Angelika Figueroa MD   nitroGLYCERIN (NITROSTAT) 0.4 MG SL tablet up to max of 3 total doses. If no relief after 1 dose, call 911. Patient not taking: Reported on 5/12/2022 4/28/20   Lyubov Brown MD   haloperidol (HALDOL) 5 MG tablet Take 5 mg by mouth nightly Indications: Schizophrenia     Historical Provider, MD   folic acid (FOLVITE) 1 MG tablet Take 1 mg by mouth daily Indications: Anemia     Historical Provider, MD   Iron Polysacch [id number] (NIFEREX-150 FORTE PO) Take 1 tablet by mouth daily     Historical Provider, MD   Cyanocobalamin (VITAMIN B-12) 1000 MCG extended release tablet Take 1,000 mcg by mouth daily Indications: Anemia     Historical Provider, MD   meclizine (ANTIVERT) 25 MG tablet Take 25 mg by mouth 2 times daily Indications: Sensation of Spinning or [address] Provider, MD       ALLERGIES: Ambien [zolpidem tartrate], Aripiprazole, Capoten [captopril], Clioquinol, Clonazepam, Cogentin [benztropine], Depakote [divalproex sodium], Effexor xr [venlafaxine hcl er], Geodon [ziprasidone hcl], Lisinopril, Lyrica [pregabalin], Navane [thiothixene], Pamelor [nortriptyline hcl], Remeron [mirtazapine], Risperdal [risperidone], Seroquel [quetiapine], Trazodone and nefazodone, Wellbutrin [bupropion], Ziprasidone, and Zolpidem    REVIEW OF SYSTEM:   Review of Systems   Unable to perform ROS: Mental status change        OBJECTIVE  PHYSICAL EXAM:   Physical Exam  Vitals reviewed. Constitutional:       Appearance: She is morbidly obese. She is ill-appearing. HENT:      Head: Normocephalic and atraumatic. Nose: Nose normal.      Mouth/Throat:      Mouth: Mucous membranes are dry. Eyes:      Conjunctiva/sclera: Conjunctivae normal.      Comments: 4mm reactive bilaterally   Cardiovascular:      Rate and Rhythm: Regular rhythm. Bradycardia present. Pulses:           Dorsalis pedis pulses are 1+ on the right side and 1+ on the left side.    Pulmonary:      Effort: Pulmonary effort is normal. No respiratory distress. Breath sounds: Normal breath sounds. No stridor. No wheezing or rales. Abdominal:      General: Bowel sounds are normal. There is no distension. Palpations: Abdomen is soft. Tenderness: There is no abdominal tenderness. Comments: obese   Musculoskeletal:         General: Normal range of motion. Cervical back: Normal range of motion and neck supple. Right lower leg: 3+ Pitting Edema present. Left lower leg: 3+ Pitting Edema present. Skin:     General: Skin is cool and dry. Neurological:      Mental Status: She is lethargic. Motor: Weakness present. Comments: Opens eyes  Alert x1   lethargic   Psychiatric:      Comments: Unable to examine          BP (!) 117/59   Pulse 58   Temp 97.8 °F (36.6 °C) (Oral)   Resp 14   Ht 5' 4\" (1.626 m)   Wt 261 lb (118.4 kg)   SpO2 100%   BMI 44.80 kg/m²     DATA:     Diagnostic tests reviewed for today's visit:    Most recent labs and imaging results reviewed.      LABS:    Recent Results (from the past 24 hour(s))   POCT Glucose    Collection Time: 05/18/22 12:12 PM   Result Value Ref Range    Glucose 80 mg/dL    QC OK? ok    Lactic Acid    Collection Time: 05/18/22 12:45 PM   Result Value Ref Range    Lactic Acid 1.2 0.5 - 2.2 mmol/L   CBC with Auto Differential    Collection Time: 05/18/22 12:45 PM   Result Value Ref Range    WBC 10.7 4.8 - 10.8 K/uL    RBC 3.46 (L) 4.20 - 5.40 M/uL    Hemoglobin 8.3 (L) 12.0 - 16.0 g/dL    Hematocrit 25.6 (L) 37.0 - 47.0 %    MCV 73.9 (L) 82.0 - 100.0 fL    MCH 24.0 (L) 27.0 - 31.3 pg    MCHC 32.4 (L) 33.0 - 37.0 %    RDW 19.4 (H) 11.5 - 14.5 %    Platelets 494 062 - 817 K/uL    Neutrophils % 84.7 %    Lymphocytes % 7.5 %    Monocytes % 7.4 %    Eosinophils % 0.2 %    Basophils % 0.2 %    Neutrophils Absolute 9.0 (H) 1.4 - 6.5 K/uL    Lymphocytes Absolute 0.8 (L) 1.0 - 4.8 K/uL    Monocytes Absolute 0.8 0.2 - 0.8 K/uL    Eosinophils Absolute 0.0 0.0 - 0.7 K/uL Basophils Absolute 0.0 0.0 - 0.2 K/uL   Comprehensive Metabolic Panel    Collection Time: 05/18/22 12:45 PM   Result Value Ref Range    Sodium 125 (L) 135 - 144 mEq/L    Potassium 6.4 (HH) 3.4 - 4.9 mEq/L    Chloride 90 (L) 95 - 107 mEq/L    CO2 29 20 - 31 mEq/L    Anion Gap 6 (L) 9 - 15 mEq/L    Glucose 117 (H) 70 - 99 mg/dL    BUN 35 (H) 8 - 23 mg/dL    CREATININE 1.91 (H) 0.50 - 0.90 mg/dL    GFR Non-African American 26.4 (L) >60    GFR  32.0 (L) >60    Calcium 8.8 8.5 - 9.9 mg/dL    Total Protein 7.0 6.3 - 8.0 g/dL    Albumin 3.2 (L) 3.5 - 4.6 g/dL    Total Bilirubin 0.8 (H) 0.2 - 0.7 mg/dL    Alkaline Phosphatase 147 (H) 40 - 130 U/L    ALT 93 (H) 0 - 33 U/L     (H) 0 - 35 U/L    Globulin 3.8 (H) 2.3 - 3.5 g/dL   Magnesium    Collection Time: 05/18/22 12:45 PM   Result Value Ref Range    Magnesium 2.0 1.7 - 2.4 mg/dL   Basic Metabolic Panel    Collection Time: 05/18/22  3:15 PM   Result Value Ref Range    Sodium 126 (L) 135 - 144 mEq/L    Potassium 6.1 (HH) 3.4 - 4.9 mEq/L    Chloride 92 (L) 95 - 107 mEq/L    CO2 24 20 - 31 mEq/L    Anion Gap 10 9 - 15 mEq/L    Glucose 97 70 - 99 mg/dL    BUN 35 (H) 8 - 23 mg/dL    CREATININE 2.11 (H) 0.50 - 0.90 mg/dL    GFR Non-African American 23.5 (L) >60    GFR  28.5 (L) >60    Calcium 8.2 (L) 8.5 - 9.9 mg/dL       IMAGING:  No results found.     VTE Prophylaxis: enoxaparin    ASSESSMENT AND PLAN    61 y.o. female with PMH of asthma, CAD s/p CABG, CHF, CKD3, CVA, DM2, HPL, HTN, hypothyroidism, depression, schizophrenia, and morbid obesity presented with:    Acute encephalopathy; unknown etiology metabolic versus infectious  -CTH stat now  -check ABG  -neuro consult  -rocephin for treatment recent UTI  -stop cipro  -follow-up culture results  -urine drug screen  -NPO  -neuro checks    RADHA (acute kidney injury) ([facility] [address]  Hyperkalemia  -Received NS bolus, ca gluconate, dextrose insulin and bicarb in ED  -repeat potassium now   -follow labs  -hold nephrotoxins    Hematuria  -has chronic park  -park changed in ED  -urology consulted  -f/u CT results of kidney results pending  -strict I&O    Hyponatremia  -hold SSRI  -gentle IVF  -repeat labs in am    Elevated LFTs  -hold hepatotoxins  -repeat in am    Microcytic anemia  -Follow trend  -iron studies   I saw and evaluated the pt.  I personally obtained the key and critical portions of the history and physical exam. I reviewed the mild level documentation and agree with assessment and plan that we come up together  Electronically signed by Sindi Land MD on 5/19/22 at 12:08 PM EDT      Plan of care discussed with: patient and family    SIGNATURE: ESDRAS Giron - FAN  DATE: May 18, 2022  TIME: 5:57 PM

## 2022-05-18 NOTE — ED NOTES
Report given to the nurse [facility] (Madai Yoon)      Governor SALMA Jin  05/18/22 Alice Kruse RN  05/18/22 [id number]

## 2022-05-19 NOTE — CONSULTS
[facility] Neurology Consult Note  Name: Payal Menon  Age: 61 y.o. Gender: female  CodeStatus: Full Code  Allergies: Ambien [Zolpidem Tartrate]  Aripiprazole  Capoten [Captopril]  Clioquinol  Clonazepam  Cogentin [Benztropine]  Depakote [Divalproex Sodium]  Effexor Xr [Venlafaxine Hcl Er]  Geodon [Ziprasidone Hcl]  Lisinopril  Lyrica [Pregabalin]  Navane [Thiothixene]  Pamelor [Nortriptyline Hcl]  Remeron [Mirtazapine]  Risperdal [Risperidone]  Seroquel [Quetiapine]  Trazodone And Nefazodone  Wellbutrin [Bupropion]  Ziprasidone  Zolpidem    Chief Complaint:Hypoglycemia (PER SQUAD )    Primary Care Provider: Law Moon  InpatientTreatment Team: Treatment Team: Attending Provider: Yonas Grace MD; Consulting Physician: Maria Teresa Sánchez MD; Consulting Physician: Felice Torrez MD; Consulting Physician: Desiree Campos MD; Registered Nurse: Ave Ellis RN; Respiratory Therapist (Day): Lynnette Day RCP; Utilization Reviewer: Winston Kidd RN  Admission Date: 5/18/2022      HPI   Consulting provider: Cara Hirsch CNP for encephalopathy  Pt seen and examined for neurology consult. 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 She had also been seen in the emergency room the day prior on 5/17/2022 for urinary tract infection and was started on Cipro. Patient now with hematuria. Apparently over the last couple days patient had been having increasing lethargy. Eitel signs upon arrival were 98/35, 57, 13, 97.8, 100% room air. On presentation she was noted to have a sodium of 125 and potassium of 6.4. Hemoglobin was low at 8.3.   Creatinine elevated at 1.91 although patient does have CKD but the day prior her creatinine was 1.35. Urinalysis was turbid with moderate blood and positive nitrite and positive leukocyte Estrace. Most recent ABG showed elevated CO2 of 56 and pH of 7.309. Procalcitonin is elevated at 1.82. LFTs mildly elevated with an ALT of 93 and AST of 151. CT the head was negative for acute findings. Patient is currently alert and oriented x1, no acute distress, cooperative. No obvious focal deficits. Afebrile. Blood pressure currently stable. Bradycardic at times. Denies headache. No vision changes. No seizure activity reported. Patient has involuntary twitching of the face which has been a chronic issue for her. These are not new findings. As noted above,   Vitals:    05/19/22 0754   BP: (!) 115/96   Pulse: (!) 47   Resp: 16   Temp: 97.2 °F (36.2 °C)   SpO2: 100%   History reviewed. No pertinent family history. Social History     Socioeconomic History    Marital status:      Spouse name: Not on file    Number of children: Not on file    Years of education: Not on file    Highest education level: Not on file   Occupational History    Not on file   Tobacco Use    Smoking status: Never Smoker    Smokeless tobacco: Never Used   Vaping Use    Vaping Use: Never used   Substance and Sexual Activity    Alcohol use: Never    Drug use: Never    Sexual activity: Not on file   Other Topics Concern    Not on file   Social History Narrative         Lives With: Spouse    Type of Home: [address] [place] [address] [place] [address]: One level    Home Access: Elevator    Bathroom Shower/Tub: Tub/Shower unit(Simultaneous filing.  User may not have seen previous data.)    Bathroom Equipment: Grab bars in shower, Shower chair    Home Equipment: Rolling walker, [employer] 450 bed    ADL Assistance: Needs assistance    Homemaking Assistance: Needs assistance    Homemaking Responsibilities: No    Ambulation Assistance: Independent    Transfer Assistance: Independent    Active : No    Additional Comments: Pt wears orthopedic shoes at baseline    The patient states she is current with [facility](RN per the ). The patient had a walker, but obtained a hospital bed, wheel chair, BSC and O2 last admission (from [address]). pharmacy is [address] [place] [address],[address] [address] [facility]. Transportation is provided by [employer]	[place] () per the patient     Social Determinants of Health     Financial Resource Strain:     Difficulty of Paying Living Expenses: Not on file   Food Insecurity:     Worried About [facility] in the Last Year: Not on file    Shayy of Food in the Last Year: Not on file   Transportation Needs:     Lack of Transportation (Medical): Not on file    Lack of Transportation (Non-Medical): Not on file   Physical Activity:     Days of Exercise per Week: Not on file    Minutes of Exercise per Session: Not on file   Stress:     Feeling of Stress : Not on file   Social Connections:     Frequency of Communication with Friends and Family: Not on file    Frequency of Social Gatherings with Friends and Family: Not on file    Attends [employer] Services: Not on file    Active Member of Clubs or Organizations: Not on file    Attends Club or Organization Meetings: Not on file    Marital Status: Not on file   Intimate Partner Violence:     Fear of Current or Ex-Partner: Not on file    Emotionally Abused: Not on file    Physically Abused: Not on file    Sexually Abused: Not on file   Housing Stability:     Unable to Pay for Housing in the Last Year: Not on file    Number of [place] in the Last Year: Not on file    Unstable Housing in the Last Year: Not on file        Review of Systems   Unable to perform ROS: Mental status change   Constitutional: Negative for fever. HENT: Negative for hearing loss and trouble swallowing. Eyes: Negative for visual disturbance.    Respiratory: Negative for cough, chest tightness, shortness of breath and wheezing. Cardiovascular: Negative for chest pain, palpitations and leg swelling. Gastrointestinal: Negative for nausea and vomiting. Genitourinary: Positive for difficulty urinating (park) and hematuria. Skin: Negative for color change and rash. Neurological: Positive for tremors (  Facial twitching). Negative for dizziness, seizures, syncope, facial asymmetry, speech difficulty, weakness, light-headedness, numbness and headaches. Psychiatric/Behavioral: Positive for confusion. Negative for agitation and hallucinations. The patient is not nervous/anxious. Physical Exam  Vitals and nursing note reviewed. Constitutional:       General: She is not in acute distress. Appearance: She is obese. She is not diaphoretic. HENT:      Head: Normocephalic. Eyes:      Pupils: Pupils are equal, round, and reactive to light. Cardiovascular:      Rate and Rhythm: Normal rate and regular rhythm. Pulmonary:      Effort: Pulmonary effort is normal. No respiratory distress. Breath sounds: Normal breath sounds. Abdominal:      General: Bowel sounds are normal.      Palpations: Abdomen is soft. Skin:     General: Skin is warm and dry. Neurological:      Mental Status: She is alert. She is disoriented. Cranial Nerves: No cranial nerve deficit, dysarthria or facial asymmetry. Motor: Tremor (  Facial twitching which is chronic) present. No weakness or seizure activity. Comments: Patient encephalopathic. Neuro exam limited as she has some difficulty following commands.          As noted above       Medications:  Reviewed    Infusion Medications:    sodium chloride      sodium chloride 50 mL/hr at 05/19/22 0004    dextrose       Scheduled Medications:    sodium zirconium cyclosilicate  10 g Oral BID    insulin lispro  0-12 Units SubCUTAneous TID WC    insulin lispro  0-6 Units SubCUTAneous Nightly    gabapentin  300 mg Oral Nightly    sodium chloride flush  5-40 mL IntraVENous 2 times per day    enoxaparin  30 mg SubCUTAneous BID    cefTRIAXone (ROCEPHIN) IV  1,000 mg IntraVENous Q24H    aspirin  81 mg Oral Daily    [Held by provider] atorvastatin  80 mg Oral Nightly    [Held by provider] busPIRone  10 mg Oral BID    carvedilol  6.25 mg Oral BID WC    [Held by provider] doxepin  25 mg Oral Nightly    [Held by provider] furosemide  40 mg Oral QAM    hydrALAZINE  50 mg Oral 3 times per day    insulin glargine  60 Units SubCUTAneous Nightly    insulin lispro  15 Units SubCUTAneous TID AC    iron polysaccaride  1 capsule Oral Daily    isosorbide dinitrate  20 mg Oral TID    levothyroxine  50 mcg Oral Daily    meclizine  25 mg Oral BID    montelukast  10 mg Oral Nightly    oxybutynin  5 mg Oral Daily    ranolazine  1,000 mg Oral BID    [Held by provider] sertraline  100 mg Oral Daily    ticagrelor  90 mg Oral BID    prazosin  2 mg Oral Nightly    cycloSPORINE  1 drop Both Eyes Nightly    nystatin   Topical BID     PRN Meds: sodium chloride flush, sodium chloride, ondansetron **OR** ondansetron, acetaminophen **OR** acetaminophen, senna, polyethylene glycol, glucose, dextrose bolus **OR** dextrose bolus, glucagon (rDNA), dextrose, albuterol    Labs:   Recent Labs     05/17/22  1300 05/17/22  1300 05/18/22  1245 05/18/22 2032 05/19/22  0621   WBC 10.6  --  10.7  --  8.5   HGB 8.8*   < > 8.3* 9.1* 8.9*   HCT 27.7*  --  25.6*  --  26.8*     --  256  --  279    < > = values in this interval not displayed. Recent Labs     05/17/22  1309 05/18/22  1515 05/18/22  1515 05/18/22 2029 05/18/22 2032 05/19/22  0032 05/19/22  0811   NA  --  126*  --   --   --  128* 126*   K  --  6.1*   < > 6.3*  --  6.0* 6.2*   CL  --  92*  --   --   --  93* 92*   CO2  --  24  --   --   --  23 27   BUN  --  35*  --   --   --  36* 37*   CREATININE   < > 2.11*  --   --  2.5* 2.45* 2.53*   CALCIUM  --  8.2*  --   --   --  9.0 8.9    < > = values in this interval not displayed. Recent Labs     22  1300 22  1245 22  0811   AST 64* 151* 128*   ALT 40* 93* 95*   BILITOT 0.7 0.8* 0.6   ALKPHOS 136* 147* 145*     No results for input(s): INR in the last 72 hours. Recent Labs     22  1300   TROPONINI 0.082*       Urinalysis:   Lab Results   Component Value Date    NITRU POSITIVE 2022    WBCUA >100 2022    BACTERIA MANY 2022    RBCUA >100 2022    BLOODU MODERATE 2022    SPECGRAV 1.045 2022    GLUCOSEU Negative 2022       Radiology:   Most recent    EEG No valid procedures specified. MRI of Brain No results found for this or any previous visit. Results for orders placed during the hospital encounter of 20    MRI BRAIN WO CONTRAST    Addendum 2020  3:11 PM  Addendum: The covering physician has been notified. Narrative  Patient MRN: [id number]    : 1955    Order Date: 2020 1:00 PM.    Exam: MRI BRAIN WO CONTRAST    Number of Views: 304    Indication: 60-year-old female Presenting to emergency department with dizziness since afternoon. Patient reports intractable nausea, vomiting. Patient reporting loss of balance. History of cardiomyopathy, coronary artery disease, diabetes and  hypertension    Comparison: Head CT 2020. CTA had 2020. Contrast dosage: None    Findings: Focal area of restricted diffusion in the deep white matter of the posterior right frontal lobe. Otherwise severely limited examination secondary to motion artifact. Pituitary gland sellar/suprasellar regions are nondiagnostic. Mild cerebral volume loss/atrophy. No extra-axial fluid collection or hematoma. T2 flow-voids are nondiagnostic secondary to motion artifact. Optic globes and orbital contents nondiagnostic secondary to motion artifact. Paranasal sinuses and mastoid air  cells grossly unremarkable. No intrinsic noncontrast T1 shortening.     Scattered foci of T2 FLAIR hyperintensity noted in the left frontal lobe white matter and periventricular regions. T2 star gradient imaging demonstrates no large areas of blooming artifact or intraparenchymal hemorrhage. Susceptibility weighted imaging  also demonstrating a large areas of blooming artifact. Severely limited secondary to motion artifact. Impression  Referring physician has been paged  1. Small focus of restricted diffusion involving the cortical/subcortical region and extending in the deep white matter of the posterior right frontal lobe consistent with acute/subacute cerebrovascular infarction/accident. 2. Mild cerebral volume loss/atrophy with white matter changes consistent with sequelae small vessel ischemic disease. 3. Otherwise, severely limited examination secondary to multiple sequences having severe/nondiagnostic/near nondiagnostic motion artifact. MRA of the Head and Neck: No results found for this or any previous visit. No results found for this or any previous visit. No results found for this or any previous visit. CT of the Head: Results for orders placed during the hospital encounter of 05/18/22    CT Head WO Contrast    Narrative  EXAMINATION:  CT HEAD WO CONTRAST    HISTORY:  Altered mental status. Confusion. This    TECHNIQUE:  Serial axial images without IV contrast were obtained from the vertex to the foramen magnum. Sagittal and coronal reconstructions. All CT scans at this facility use dose modulation, iterative reconstruction, and/or weight based dosing when appropriate to reduce radiation dose to as low as reasonably achievable. COMPARISON:  4/27/2022. RESULT:    Acute change:   No evidence of an acute infarct or other acute parenchymal process. Hemorrhage:    No evidence of acute intracranial hemorrhage. Mass Lesion / Mass Effect:   There is no evidence of an intracranial mass or extraaxial fluid collection. No significant mass effect.     Chronic change:   None apparent. Parenchyma: There is no significant volume loss for age. The brain parenchyma is otherwise within normal limits for age. Vascular calcifications    Ventricles: The ventricles are within normal limits of size and configuration for age. Paranasal sinuses and skull base:  Mild mucosal thickening ethmoid air cells. Mastoid air cells are clear. The skull base is unremarkable. Bilateral lens replacement surgery. Impression  No acute intracranial process. No results found for this or any previous visit. No results found for this or any previous visit. Carotid duplex: No results found for this or any previous visit. No results found for this or any previous visit. Results for orders placed during the hospital encounter of 09/21/21    US CAROTID ARTERY BILATERAL    Narrative  EXAMINATION: CAROTID ULTRASOUND    CLINICAL HISTORY: 42-year-old with diabetes hyperlipidemia and hypertension. History of stroke and carotid stenosis    FINDINGS: Duplex and color Doppler ultrasound were performed of the bilateral extracranial carotid systems. Velocity criteria and internal carotid artery stenoses are extrapolated from data as defined by the Society of Radiologist in [facility] Radiology 2003; 958;[id number]. Comparison made with a previous carotid ultrasound from 4/21/2020    RIGHT CAROTID SYSTEM: There is a moderate amount of heterogeneous calcified plaque in the bulb extending into the proximal ICA and ECA. Shadowing along the anterior carotid wall limits the sonographic window. On today's examination, There are no  elevations of peak systolic velocities or ICA/CCA velocity ratios. Velocities in the ICA range from 114 cm/s proximal aspect, 96 cm/s mid aspect to 12 cm/s distal aspect. ICA/CCA velocity ratio is 1.5. By ultrasound criteria there is less than 50 percent  diameter reduction of the right ICA. Peak systolic velocities in the proximal ECA and mid CCA are 199 and 75cm/s. There is antegrade flow in the right vertebral artery. LEFT CAROTID SYSTEM:  There is intimal thickening throughout the common carotid artery with scattered hyperechoic plaque. Moderate heterogeneous plaque is demonstrated in the bulb extending into the ICA and ECA. . Velocities in the proximal ICA are now  elevated to 211 cm/s. Velocities in the ICA range from 211 cm/s proximal aspect, 106 cm/s mid aspect to 77 cm/s distal aspect. ICA/CCA velocity ratio is 2.5. By ultrasound criteria there is 50-69% diameter reduction of the left ICA. Peak systolic  velocities in the proximal ECA and mid CCA are 299 and 85cm/s. There is antegrade flow in the left vertebral artery. Impression  ATHEROSCLEROTIC PLAQUE IN BOTH CAROTID SYSTEMS LEFT GREATER THAN RIGHT. BY VELOCITY CRITERIA THERE IS 50-69% DIAMETER REDUCTION OF THE LEFT ICA AND LESS THAN 50% AREA REDUCTION OF THE RIGHT ICA. HOWEVER, STUDY IS LIMITED DUE TO CALCIFIED  PLAQUE PARTIALLY OBSCURING THE IS SONOGRAPHIC WINDOWS. CONSIDER CORRELATION WITH CT ANGIOGRAPHY. ANTEGRADE VERTEBRAL FLOW. Echo No results found for this or any previous visit. Assessment/Plan:  Acute metabolic encephalopathy in the setting of UTI, hyponatremia, hyperkalemia, RADHA on CKD and transaminitis. CT of the head negative for acute findings  Patient has been changed from Cipro to Rocephin. RADHA continues to worsen with a creatinine of 2.53 currently. Nephrology is following and feels it is secondary to hypotension and contrast-induced nephropathy from CT of the abdomen pelvis done on 5/17/2022. Patient with history of right frontal CVA in May 2020. There is question at that time if it was a stroke versus a blood vessel. She continues on dual antiplatelet therapy with aspirin and Brilinta. She is currently nonfocal.  No need for further imaging at this time. We will continue to follow.     I have personally performed a face to face diagnostic evaluation on this patient, reviewed all data and investigations, and am the sole provider of all clinical decisions on the neurological status of this patient. as noted,acute encephalopathy, multiple medical issues , watch for now      Ced Walker MD, [id number], American Board of Psychiatry & Neurology  Board Certified in Vascular Neurology  Board Certified in Neuromuscular Medicine  Certified in Neurorehabilitation       Collaborating physicians: Dr Waqas Walker    Electronically signed by ESDRAS Chicas CNP on 5/19/2022 at 2:09 PM

## 2022-05-19 NOTE — PROGRESS NOTES
Patient assessed and charted on by this RN. VSS. HR ana maria. Held Coreg this morning. Patient alert to self this morning. Able to take medications orally without difficulty. NG tube remains in place. ST to see patient. Fall precautions are in place. Call light within reach. Will continue to monitor.

## 2022-05-19 NOTE — PROGRESS NOTES
[facility]   Facility/Department: [facility]  Speech Language Pathology  Clinical Bedside Swallow Evaluation    Martina Estrella  : 1955 (61 y.o.)   [x]   confirmed    MRN: [id number]  [id number]  ADMISSION DATE: 2022  PATIENT DIAGNOSIS(ES): Hemorrhagic cystitis [N30.91]  Lethargy [R53.83]  RADHA (acute kidney injury) ([address] [N17.9]  Altered mental status, unspecified altered mental status type [R41.82]  Chief Complaint   Patient presents with    Hypoglycemia     PER SQUAD      Patient Active Problem List    Diagnosis Date Noted    RADHA (acute kidney injury) ([address] 2022    Myopathy 2022    Polyneuropathy associated with underlying disease ([facility] 2022    Shock ([address]     Gram-positive bacteremia     Acute cholecystitis     Abdominal pain, right upper quadrant     Abdominal pain 2022    Depression     Acute decompensated heart failure ([address] 2021    History of angioplasty of peripheral vessel 2021    History of coronary artery bypass surgery 2021    Pneumonia 2021    CKD (chronic kidney disease) stage 3, GFR 30-59 ml/min ([facility]     Pain in right hand     Carpal tunnel syndrome 2021    NSTEMI (non-ST elevated myocardial infarction) ([address] 2021    Anesthesia of skin 2021    Paresthesia of skin 2021    Disorder of carotid artery (HCC) 2021    Obesity (BMI 30-39.9) 2020    Heart failure, diastolic, with acute decompensation (HCC) 2020    Nausea and vomiting     Ataxic gait 2020    Gangrene of toe ([address] 2020    Cerebrovascular disease 2020    Dizziness 2020    Acute on chronic combined systolic and diastolic congestive heart failure ([address] 2020    Acute kidney injury superimposed on CKD ([address] 2020    Nonrheumatic mitral (valve) insufficiency 2020    Pulmonary hypertension ([address] 2020    Syncope and collapse 2020  Cellulitis 03/04/2020    Chest pain 02/11/2020    JESICA (obstructive sleep apnea)     Athscl native arteries of left leg w ulceration oth prt foot ([facility] 09/27/2019    Rectal bleeding     Surgical wound dehiscence, initial encounter 07/26/2019    S/P amputation of lesser toe, right ([address] 07/26/2019    Ischemic myocardial dysfunction 06/28/2019    Coronary arteriosclerosis 06/28/2019    Encephalopathy 06/28/2019    Drug-induced hypotension 06/28/2019    Osteomyelitis of right foot ([address] 06/28/2019    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     Infection of skin 05/25/2019    Osteomyelitis ([address] 05/14/2019    Atherosclerotic PVD with intermittent claudication ([address] 04/30/2019    Peripheral vascular disease ([address] 04/30/2019    Non-pressure ulcer of toe ([facility] 04/23/2019    Asthma 03/24/2019    Anxiety 03/24/2019    Gastritis, unspecified, without bleeding 03/24/2019    Pure hypercholesterolemia 03/24/2019    Schizophrenia ([address] 03/24/2019    Mild intermittent asthma without complication 23/57/5484    Type 2 diabetes mellitus with diabetic neuropathy, unspecified ([address] 03/24/2019    Major depressive disorder, single episode, unspecified 03/21/2019    Diabetes mellitus ([address] 02/25/2019    Hypertension 02/25/2019    HLD (hyperlipidemia) 02/25/2019    Thyroid disease 02/25/2019    Congestive heart failure ([address] 02/25/2019    Secondary diabetes mellitus ([address] 02/25/2019    Major depressive disorder, recurrent, severe with psychotic symptoms ([address] 02/22/2019     Past Medical History:   Diagnosis Date    Asthma     CAD (coronary artery disease)     CKD (chronic kidney disease) stage 3, GFR 30-59 ml/min ([facility])     Colitis     Diabetes mellitus ([address]     Hyperlipidemia     Hypertension     PAD (peripheral artery disease) ([address]     Prolonged emergence from general anesthesia     PVD (peripheral vascular disease) ([address]     Thyroid disease      Past Surgical History:   Procedure Thin  Recommended Form of Meds: PO  Compensatory Swallowing Strategies : Upright as possible for all oral intake;Small bites/sips    Reason for Referral  Fela Jesus was referred for a bedside swallow evaluation to assess the efficiency of her swallow function, identify signs and symptoms of aspiration, identify risk factors, and make recommendations regarding safe dietary consistencies, effective compensatory strategies, and safe eating environment. General  Chart Reviewed: Yes  Comments: Grenadian speaking. Subjective  Subjective: Patient's daughter present for evaluation. Patient admitted yesterday and had an NG tube placed due to lethargy. Behavior/Cognition: Alert; Cooperative;Pleasant mood  Respiratory Status: Room air  O2 Device: None (Room air)  Communication Observation: Functional  Follows Directions: Simple  Dentition: Adequate; Some missing teeth  Patient Positioning: Upright in bed  Baseline Vocal Quality: Normal  Volitional Cough: Strong  Prior Dysphagia History: None  Consistencies Administered: Pureed;Regular; Thin - straw    Current Diet level  Current Diet : NPO  Current Liquid Diet : NPO    Oral Motor  Labial: No impairment  Dentition: Natural  Oral Hygiene: Moist;Clean  Lingual: Decreased strength  Velum: No Impairment  Mandible: No impairment  Gag:  (DNT)    Oral/Pharyngeal Phase  Oral Phase - Comment: Mild oral dysphagia characterized by decreased lingual strength resulting in increased lingual pumping per pureed solids and regular solids, suspected delayed A-P transit per the same, and extended mastication time per regular solids. Mild residuals observed throughout the oral cavity following regular solids that the patient sensed and cleared with independent use of liquid wash. Pharyngeal Phase: WFL at this time. Hyolaryngeal movement is present. No overt s/s of aspiration observed per all consistencies tested including three consecutive ounces of thin liquid via straw.     PO Trials  Neuromuscular Estim Used: No  Assessment Method(s): Palpation  Patient Position: Upright in bed  Vocal Quality: No Impairment  Consistency Presented: Thin  How Presented: Straw;Self-fed/presented;SLP-fed/Presented  How much presented:  (approximately 5oz)  Bolus Acceptance: No impairment  Bolus Formation/Control: No impairment  Propulsion: No impairment  Oral Residue: None  Initiation of Swallow: No impairment  Laryngeal Elevation:  (movement present)  Aspiration Signs/Symptoms: None  Pharyngeal Phase Characteristics: No impairment, issues, or problems  Additional PO Trials: 2    PO Trials 2  Consistency Presented: Pureed  How Presented: SLP-fed/Presented  How much presented:  (1/2 container)  Bolus Acceptance: No impairment  Bolus Formation/Control: No impairment  Type of Impairment: Anterior;Posterior;Delayed  Propulsion: Tongue pumping  Oral Residue: None  Initiation of Swallow: No impairment  Aspiration Signs/Symptoms: None  Pharyngeal Phase Characteristics: No impairment, issues, or problems    PO Trials 3  Consistency Presented: Regular  How Presented: SLP-fed/Presented  How much presented:  (1/4 package)  Bolus Acceptance: No impairment  Bolus Formation/Control: Impaired  Type of Impairment: Mastication; Anterior;Delayed; Posterior  Propulsion: Delayed (# of seconds)  Oral Residue: Less than 10% of bolus  Initiation of Swallow: No impairment  Aspiration Signs/Symptoms: None  Pharyngeal Phase Characteristics: No impairment, issues, or problems    Dysphagia Diagnosis  Dysphagia Diagnosis: Mild oral stage dysphagia  Dysphagia Impression : Clinical indicators of oral dysphagia at bedside likel acute related to weakness associated with current admission. Prognosis for improvement is good given patient's current clinical presentation and previous level of function. A modified PO diet is recommended at this time.  An instrumental swallowing procedure is not recommended at this time but will be re-assessed on an ongoing basis. Dysphagia Outcome Severity Scale: Level 5: Mild dysphagia- Distant supervision. May need one diet consistency restricted     Recommendations  Requires SLP Intervention: Yes  Recommendations: Dysphagia treatment;Assistance with meals  D/C Recommendations: To be determined  Diet Solids Recommendation: Soft & Bite Sized  Liquid Consistency Recommendation: Thin  Compensatory Swallowing Strategies : Upright as possible for all oral intake;Small bites/sips  Recommended Form of Meds: PO  Therapeutic Interventions: Diet tolerance monitoring;Oral motor exercises; Bolus control exercises  Duration of Treatment: LOS or until goals are met  Frequency of Treatment: 1-3x/week    Prognosis  Speech Therapy Prognosis  Prognosis: Good  Prognosis Considerations: Age; Family/Community Support;Participation Level    Education  Individuals consulted  Consulted and agree with results and recommendations: Patient;RN;Family member  Family member consulted: daughter  RN Name: William La    Treatment/Goals  Short-term Goals  Timeframe for Short-term Goals: 1-2 weeks  Goal 1: Patient will tolerate the recommended diet level without overt s/s of aspiration. Goal 2: Patient will complete lingual exercises that promote anterior to posterior propulsion of bolus and improve tongue base retraction with 80% accuracy in order to strengthen the muscles of the swallow to decrease risk of aspiration and to increase ability to safely handle the least restrictive diet level. Goal 3: Patient will implement the recommended swallowing strategies with fading cues to decrease risk of aspiration. Long-term Goals  Timeframe for Long-term Goals: 1-2 weeks  Goal 1: Patient will tolerate the least restrictive diet without adverse outcomes. Dysphagia Goals:  The patient will tolerate recommended diet without observed clinical signs of aspiration    Safety Devices  Safety Devices  Safety Devices in place: Yes  Type of devices: Bed alarm in place;Call light within reach  Restraints Initially in Place: No    Pain Assessment  Pain Assessment: Patient does not c/o pain    Pain Re-assessment  Pain Reassessment: Patient does not c/o pain      Therapy Time  Time in: 1125  Time out: 1138  Total minutes: 13      Signature: Electronically signed by NIRMAL Bear on 5/19/2022 at 1:02 PM

## 2022-05-19 NOTE — PROGRESS NOTES
[facility] Occupational Therapy      Date: 2022  Patient Name: Esthela Beltran        MRN: [id number]  Account: [de-identified]   : 1955  (61 y.o.)  Room: [place] [address]    Chart reviewed, attempted OT at 56 for evaluation. Patient not seen 2° to:    Pt. declined, stating: Pt interoreter reports that poatient is declining at this time due to increased pain in abdomen    Spoke to RN William Schulz RN aware. Will attempt again when able.     Electronically signed by REAGAN Brambila on  at 10:09 AM

## 2022-05-19 NOTE — FLOWSHEET NOTE
Admission assessment completed. Med reconciliation completed. VS WNL. Alert, Respond to questions, but lethargic. Sierra Leonean-speaking; interpretation devices in room. Lung diminished, clear. Sinus ana maria. Skin assessment done with Poly Matos RN: Wound noted in bottock and Zinc applied; 3rd toe of Right foot missing; Redness at groin, abd fold; Redness at old surgical site at abd, along with a piece of staple. Bandage at right A.C for IV insertion bleeding. +2 pitting edema at BLE. PM meds given. Pt K+ of 6.3 perferserved to Dr. Waymon Dance meds given and 16 fringe NG tube placed as ordered so pt can take oral Lokelma. Pt tolerate process well; NG tube placement verified by Dr. Faiza Schwartz; NG currently clamped. Small bloody urine noted in park-unable to receive enough specimen for lab at moment. Standard safety precaution in room    0612: 50ml bloody urine noted from Park; Pt had 1 BM before lokelma was given, no BM noted after. Pt denies further needs at the moment.  Lab in room

## 2022-05-19 NOTE — CARE COORDINATION
[facility] [place] Case Management   Initial Discharge Assessment    Met with patient and her daughter at bedside to discuss discharge plan. PCP: Renato Lizarraga                                  Date of Last Visit: has been in SNF  [facility] Patient: No          If no PCP, list provided? N/A    Discharge Planning    Living Arrangements: At baseline, patient lived independently at home. She has been in a SNF since 5/4 (post-surgery). Who do you live with? Spouse at baseline. Who helps you with your care: Patient had been independent pre-op. If lives at home: Do you have any barriers navigating in your home? Patient had been ambulatory prior, but has not walked since her surgery. Patient can perform ADL? Yes    Current Services (outpatient and in home): Was in SNF, plan DC to SNF. Dialysis: No    Is transportation available to get to your appointments? Yes - Daughter or ZAN. DME Equipment: None    Respiratory equipment: PRN/HS Oxygen 2 Liters    Respiratory provider: Unknown     Pharmacy: [facility] on [address] [facility] with Medication Assistance Program?  No      [place] [address]? N/A - needs to DC to SNF. Patient agreeable to SNF/Rehab? Yes, Company - List given to patient and daughter; patient does NOT want to [facility]. Other discharge needs identified? Other - TBD based on work-up. Does Patient Have a High-Risk for Readmission Diagnosis (CHF, PN, MI, COPD)? No    Initial Discharge Plan? (Note: please see concurrent daily documentation for any updates after initial note). SNF - await choice from patient/daughter.     Readmission Risk              Risk of Unplanned Readmission:  54         Electronically signed by Juan Stein RN on 5/19/2022 at 11:00 AM

## 2022-05-19 NOTE — PROGRESS NOTES
Progress Note  Date:2022       Room:[facility]-  Rodolfo Henderson Gross     YOB: 1955     Age:66 y.o. Subjective    Subjective:  Symptoms:  She reports malaise and weakness. No shortness of breath, cough, chest pain, chest pressure, anorexia, diarrhea or anxiety. Diet:  No nausea or vomiting. Review of Systems   Respiratory: Negative for cough and shortness of breath. Cardiovascular: Negative for chest pain. Gastrointestinal: Negative for anorexia, diarrhea, nausea and vomiting. Neurological: Positive for weakness. Objective         Vitals Last 24 Hours:  TEMPERATURE:  Temp  Av.4 °F (36.3 °C)  Min: 97.2 °F (36.2 °C)  Max: 97.8 °F (36.6 °C)  RESPIRATIONS RANGE: Resp  Av.1  Min: 8  Max: 17  PULSE OXIMETRY RANGE: SpO2  Av.5 %  Min: 95 %  Max: 100 %  PULSE RANGE: Pulse  Av.1  Min: 47  Max: 60  BLOOD PRESSURE RANGE: Systolic (46NQN), DAYTON:502 , Min:98 , PZM:836   ; Diastolic (87LMN), PHJ:34, Min:35, Max:96    I/O (24Hr): Intake/Output Summary (Last 24 hours) at 2022 1207  Last data filed at 2022 0559  Gross per 24 hour   Intake 1422.23 ml   Output 50 ml   Net 1372.23 ml     Objective:  General Appearance:  Comfortable, well-appearing and ill-appearing. Vital signs: (most recent): Blood pressure (!) 115/96, pulse (!) 47, temperature 97.2 °F (36.2 °C), temperature source Axillary, resp. rate 16, height 5' 4\" (1.626 m), weight 261 lb (118.4 kg), SpO2 100 %, not currently breastfeeding. HEENT: Normal HEENT exam.    Heart: Normal rate. S1 normal.    Abdomen: Abdomen is soft. Bowel sounds are normal.   There is no epigastric area tenderness. Pulses: Distal pulses are intact. Neurological: Patient is alert. Pupils:  Pupils are equal, round, and reactive to light. Skin:  Warm.       Labs/Imaging/Diagnostics    Labs:  CBC:  Recent Labs     22  1300 22  1300 22  1245 222 22  0621   WBC 10. 6  --  10.7  --  8.5   RBC 3.75*  --  3.46*  --  3.64*   HGB 8.8*   < > 8.3* 9.1* 8.9*   HCT 27.7*  --  25.6*  --  26.8*   MCV 73.9*  --  73.9*  --  73.8*   RDW 19.2*  --  19.4*  --  19.6*     --  256  --  279    < > = values in this interval not displayed. CHEMISTRIES:  Recent Labs     05/17/22  1300 05/17/22  1309 05/18/22  1245 05/18/22  1245 05/18/22  1515 05/18/22  1515 05/18/22 2029 05/18/22 2032 05/19/22  0032 05/19/22  0811   *   < > 125*   < > 126*  --   --   --  128* 126*   K 5.1*   < > 6.4*   < > 6.1*   < > 6.3*  --  6.0* 6.2*   CL 94*   < > 90*   < > 92*  --   --   --  93* 92*   CO2 25   < > 29   < > 24  --   --   --  23 27   BUN 28*   < > 35*   < > 35*  --   --   --  36* 37*   CREATININE 1.35*   < > 1.91*   < > 2.11*  --   --  2.5* 2.45* 2.53*   GLUCOSE 82   < > 117*   < > 97  --   --   --  158* 133*   MG 2.0  --  2.0  --   --   --   --   --   --   --     < > = values in this interval not displayed. PT/INR:No results for input(s): PROTIME, INR in the last 72 hours. APTT:No results for input(s): APTT in the last 72 hours. LIVER PROFILE:  Recent Labs     05/17/22  1300 05/18/22 1245 05/19/22  0811   AST 64* 151* 128*   ALT 40* 93* 95*   BILITOT 0.7 0.8* 0.6   ALKPHOS 136* 147* 145*       Imaging Last 24 Hours:  CT Head WO Contrast    Result Date: 5/18/2022  EXAMINATION:  CT HEAD WO CONTRAST HISTORY:  Altered mental status. Confusion. This TECHNIQUE:  Serial axial images without IV contrast were obtained from the vertex to the foramen magnum. Sagittal and coronal reconstructions. All CT scans at this facility use dose modulation, iterative reconstruction, and/or weight based dosing when appropriate to reduce radiation dose to as low as reasonably achievable. COMPARISON:  4/27/2022. RESULT: Acute change:   No evidence of an acute infarct or other acute parenchymal process. Hemorrhage:    No evidence of acute intracranial hemorrhage.  Mass Lesion / Mass Effect:   There is no evidence of an intracranial mass or extraaxial fluid collection. No significant mass effect. Chronic change:   None apparent. Parenchyma: There is no significant volume loss for age. The brain parenchyma is otherwise within normal limits for age. Vascular calcifications Ventricles: The ventricles are within normal limits of size and configuration for age. Paranasal sinuses and skull base:  Mild mucosal thickening ethmoid air cells. Mastoid air cells are clear. The skull base is unremarkable. Bilateral lens replacement surgery. No acute intracranial process. CT ABDOMEN PELVIS W IV CONTRAST Additional Contrast? None    Result Date: 5/17/2022  EXAMINATION:  CT ABDOMEN AND PELVIS. CLINICAL HISTORY:  EPIGASTRIC PAIN. COMPARISONS:  5-22. TECHNIQUE:  Serial 5 mm scans were obtained through the abdomen and pelvis after injection of IV contrast. FINDINGS:  There is slight pleural reaction in both lower thoraces. There would appear to be diffuse fatty infiltration of the liver. The spleen and pancreas appear normal. Patient has had a cholecystectomy. Kidneys and adrenals appear normal. There is a  small amount of free fluid in the upper abdomen at this time. No definite inflammatory change is demonstrated in the abdomen or pelvis. Marked amount of soft tissue induration, particularly on the left is again noted. This seems a little more prominent than it did on the previous exam. The aorta is small in caliber and there is a tremendous amount of vascular calcification noted. No significant bony abnormality. 1. SMALL AMOUNT OF FREE FLUID IN THE UPPER ABDOMEN. 2. MARKED AMOUNT OF SOFT TISSUE INDURATION IN THE ENTIRE ABDOMINAL WALL. THIS IS MORE PRONOUNCED ON THE LEFT AND PERHAPS A LITTLE MORE EXTENSIVE THAN ON THE PREVIOUS EXAM. AN INFLAMMATORY PROCESS OR ABSCESS CANNOT BE EXCLUDED. CT KIDNEY WO CONTRAST    Result Date: 5/18/2022  EXAMINATION:  CT KIDNEY WO CONTRAST HISTORY:   Hematuria.  TECHNIQUE: Non-IV contrast imaging of the abdomen and pelvis was performed using standard technique, scanning from just above the dome of the diaphragm to the symphysis pubis. Including coronal and sagittal reconstructions. Unenhanced imaging is limited for the evaluation of some intra-abdominal and pelvic pathology. Contrast: IV: None Oral:  None. All CT scans at this facility use dose modulation, iterative reconstruction, and/or weight based dosing when appropriate to reduce radiation dose to as low as reasonably achievable. COMPARISON: CT 5/17/2022. RESULT: Abdomen / Pelvis: Liver: Possible hepatic steatosis. No distinct focal lesion in the unenhanced visualized liver. Biliary: Cholecystectomy. No bile duct dilation. Pancreas: Unremarkable. Spleen: No splenomegaly. Adrenals: No mass. Kidneys: Nonobstructing bilateral renal calculi, unchanged from prior. Residual enhancement of the kidneys, suggestive of delayed nephrograms and decreased renal function, better assessed with laboratory values. GI Tract: Feces throughout the colon. No bowel dilation. Normal appendix. Lymph Nodes: No lymphadenopathy. Mesentery/peritoneum/retroperitoneum: Small volume ascites, similar to prior. Vasculature: Diffuse calcifications of the aorta without evidence for aneurysm. Pelvis: Bladder decompressed with Colon catheter with air in the bladder likely related to the catheter. Bones/soft tissues: No acute osseous findings. No destructive osseous lesions. Diffuse soft tissue edema/anasarca. Focal areas of fluid within the left abdominal wall musculature, unchanged. Lower thorax: Patchy opacities in the visualized lung bases with trace bilateral pleural effusions. Dense mitral annular calcification. Coronary calcification. Small volume ascites. Nonobstructing bilateral renal calculi. Delayed nephrograms from contrast enhanced study yesterday suggestive of decreased renal function. Correlate with renal function tests.  Bladder decompressed with Colon catheter with air in the bladder likely secondary to placement. There is concern for cystitis, please correlate with urinalysis. Nonspecific patchy opacity within the lung bases may be infectious/inflammatory or could relate aspiration. Trace bilateral pleural effusions. Diffuse soft tissue edema/anasarca with focal areas of fluid within the left abdominal wall musculature, unchanged. Again differential includes hematoma/stroma, and abscess ==========     Assessment//Plan           Hospital Problems           Last Modified POA    * (Principal) RADHA (acute kidney injury) ([facility] 5/18/2022 Yes      hyperkalemia  hyponatremia  RADHA on CKD   AMS  UTI  Elevated LFTS  Hematuria  Chronic respiratory failure  [facility]  Assessment & Plan  5/19: Patient is alert now. Started on diet. Hyperkalemia is better, hyponatremia is better follow-up renal, follow-up urology follow-up neuro. Continue with Colon. Follow-up cultures sensitivity. Spoke with daughter at bedside. Mental status is improved compared to before.   Electronically signed by Aristeo Rodriguez MD on 5/19/22 at 12:07 PM EDT

## 2022-05-19 NOTE — CARE COORDINATION
Met with daughter to discuss discharge planning. Used interpretor Adali Tay  [id number] during conversation. Educated daughter on list given for nursing facilities and how the rating columns are used. Daughter to look over list and find a facility that is close to her home in [place]. Let daughter know we will check back with her tomorrow for her decision. Daughter verbalized understanding.

## 2022-05-19 NOTE — PROGRESS NOTES
Physical Therapy Missed Treatment   Facility/Department: [facility] MED SURG [id number]/[id number]    NAME: Payal Menon    : 1955 (79 y.o.)  MRN: [id number]    Account: [de-identified]  Gender: female      PT referral received. Chart reviewed. PT eval attempted at 1000, pt declined due to increased abdominal pain. RN made aware. Will follow and attempt PT evaluation again at earliest availability.        Cindi Robles, PT, 22 at 10:11 AM

## 2022-05-19 NOTE — PROGRESS NOTES
[facility] AT [place] Respiratory Therapy Evaluation   Current Order: albuterol q6 prn     Home Regimen: prn      Ordering Physician: Tuan Gilmore np  Re-evaluation Date:  5/21    Diagnosis: *asif     Patient Status: Stable / Unstable + Physician notified    The following MDI Criteria must be met in order to convert aerosol to MDI with spacer. If unable to meet, MDI will be converted to aerosol:  []  Patient able to demonstrate the ability to use MDI effectively  []  Patient alert and cooperative  []  Patient able to take deep breath with 5-10 second hold  []  Medication(s) available in this delivery method   []  Peak flow greater than or equal to 200 ml/min            Current Order Substituted To  (same drug, same frequency)   Aerosol to MDI [] Albuterol Sulfate 0.083% unit dose by aerosol Albuterol Sulfate MDI 2 puffs by inhalation with spacer    [] Levalbuterol 1.25 mg unit dose by aerosol Levalbuterol MDI 2 puffs by inhalation with spacer    [] Levalbuterol 0.63 mg unit dose by aerosol Levalbuterol MDI 2 puffs by inhalation with spacer    [] Ipratropium Bromide 0.02% unit dose by aerosol Ipratropium Bromide MDI 2 puffs by inhalation with spacer    [] Duoneb (Ipratropium + Albuterol) unit dose by aerosol Ipratropium MDI + Albuterol MDI 2 puffs by inhalation w/spacer   MDI to Aerosol [] Albuterol Sulfate MDI Albuterol Sulfate 0.083% unit dose by aerosol    [] Levalbuterol MDI 2 puffs by inhalation Levalbuterol 1.25 mg unit dose by aerosol    [] Ipratropium Bromide MDI by inhalation Ipratropium Bromide 0.02% unit dose by aerosol    [] Combivent (Ipratropium + Albuterol) MDI by inhalation Duoneb (Ipratropium + Albuterol) unit dose by aerosol       Treatment Assessment [Frequency/Schedule]:  Change frequency to: _________________________Freq Q4prn_________________________per Protocol, P&T, [facility]  Freq Q4prn    Points 0 1 2 3 4   Pulmonary Status  Non-Smoker  []   Smoking history   < 20 pack years  []   Smoking history  ?  20 pack years  []   Pulmonary Disorder  (acute or chronic)  [x]   Severe or Chronic w/ Exacerbation  []     Surgical Status No [x]   Surgeries     General []   Surgery Lower []   Abdominal Thoracic or []   Upper Abdominal Thoracic with  PulmonaryDisorder  []     Chest X-ray Clear/Not  Ordered     [x]  Chronic Changes  Results Pending  []  Infiltrates, atelectasis, pleural effusion, or edema  []  Infiltrates in more than one lobe []  Infiltrate + Atelectasis, &/or pleural effusion  []    Respiratory Pattern Regular,  RR = 12-20 [x]  Increased,  RR = 21-25 []  HARRIS, irregular,  or RR = 26-30 []  Decreased FEV1  or RR = 31-35 []  Severe SOB, use  of accessory muscles, or RR ? 35  []    Mental Status Alert, oriented,  Cooperative []  Confused but Follows commands []  Lethargic or unable to follow commands [x]  Obtunded  []  Comatose  []    Breath Sounds Clear to  auscultation  [x]  Decreased unilaterally or  in bases only []  Decreased  bilaterally  []  Crackles or intermittent wheezes []  Wheezes []    Cough Strong, Spontan., & nonproductive [x]  Strong,  spontaneous, &  productive []  Weak,  Nonproductive []  Weak, productive or  with wheezes []  No spontaneous  cough or may require suctioning []    Level of Activity Ambulatory [x]  Ambulatory w/ Assist  []  Non-ambulatory []  Paraplegic []  Quadriplegic []    Total    Score:___5____     Triage Score:__5_____      Tri       Triage:     1. (>20) Freq: Q3    2. (16-20) Freq: Q4   3. (11-15) Freq: QID & Albuterol Q2 PRN    4. (6-10) Freq: TID & Albuterol Q2 PRN    5. (0-5) Freq Q4prn

## 2022-05-19 NOTE — CARE COORDINATION
05/19/22    From: [facility] (there since 5/4)- does not want to return. Home with spouse at baseline. Admit: To ER from SNF with hypoglycemia, hematuria, decreased responsiveness, and recent UTI --> adm with hyperkalemia, RADHA. PMH: CVA    Anticipated Discharge Disposition: DC to SNF (list given to patient and her daughter). Does NOT want to return to [facility]. Patient Mobility or PT/OT ordered: Yes    Consults: Nephrology, neurology, urology. Covid result &/or vacc status: Vaccinated. Barriers to Discharge:  - Altered mental status, worsening kidney function. Assessments: CMI done.

## 2022-05-19 NOTE — CONSULTS
[facility]. Nephrology  Consult Note           Reason for Consult: RADHA  Requesting Physician:  Dr. Junior Coppola    Chief Complaint:  hypoglycemia  History Obtained From:  daughter, electronic medical record    History of Present Ilness:    61 y.o. female with history s/f T2DM, CKD stage III, CVA,CAD s/p CABG, HTN, morbid obesity, schizophrenia who presented w/ hypoglycemia from SNF. BG was 62. In addition there was concern for hematuria noted in park bag. CT A/P w/ contrast also showed small amount of free fluid in upper abdomen abdominal wall. Pt seen and cleared by general surgery however now presents again w/ labs showing RADHA w/ Scr 1.9 (now up to 2.5), hyponatremia, K up to 6.4 (now down to 6.2) and worsening liver enzymes. On potassium and lasix as outpatient. Not on ACE-I/ARB or aldactone. Pt hypotensive on presentation as well w/ SBP in the 90s. Was getting rx for UTI w/ ciprofloxacin for 1 day prior to presentation. Baseline Scr ~1-1.2 w/ eGFR mid 40s/low 50s. Pt of Mine. History corroborated w/ daughter     Notably pt was admitted in  for acute cholecystitis, RADHA on CKD, hyperkalemia, shock and bacteremia.      Past Medical History:        Diagnosis Date    Asthma     CAD (coronary artery disease)     CKD (chronic kidney disease) stage 3, GFR 30-59 ml/min (HCC)     Colitis     Diabetes mellitus ([facility] [address] [facility]     Hyperlipidemia     Hypertension     [facility]peripheral artery disease) ([facility]     Prolonged emergence from general anesthesia     PVD (peripheral vascular disease) ([facility] [address] [facility]     Thyroid disease        Past Surgical History:        Procedure Laterality Date    CARDIAC SURGERY      CATARACT REMOVAL WITH IMPLANT Bilateral 11- 11-     SECTION      CHOLECYSTECTOMY, LAPAROSCOPIC N/A 2022    LAPAROSCOPIC CHOLECYSTECTOMY performed by Junior Coppola MD at [facility] N/A 2019    COLONOSCOPY DIAGNOSTIC performed by Fred Murray MD at [facility]  CORONARY ARTERY BYPASS GRAFT  06/2019    unknown vessels    HYSTERECTOMY      TOE AMPUTATION Right     3rd toe       Home Medications:    No current facility-administered medications on file prior to encounter. Current Outpatient Medications on File Prior to Encounter   Medication Sig Dispense Refill    oxyCODONE-acetaminophen (PERCOCET) 5-325 MG per tablet Take 1 tablet by mouth every 6 hours as needed for Pain for up to 3 days. Intended supply: 3 days.  Take lowest dose possible to manage pain 12 tablet 0    ondansetron (ZOFRAN-ODT) 4 MG disintegrating tablet Take 1 tablet by mouth 3 times daily as needed for Nausea or Vomiting 21 tablet 0    ciprofloxacin (CIPRO) 500 MG tablet Take 1 tablet by mouth 2 times daily for 10 days 20 tablet 0    iron polysaccaride (FERREX 150 FORTE PLUS)  MG CAPS Take 1 capsule by mouth daily      furosemide (LASIX) 20 MG tablet Take 20 mg by mouth every morning (before breakfast)      furosemide (LASIX) 40 MG tablet Take 40 mg by mouth every morning      carvedilol (COREG) 6.25 MG tablet Take 6.25 mg by mouth 2 times daily (with meals) Indications: High Blood Pressure Disorder      hydrALAZINE (APRESOLINE) 50 MG tablet Take 1 tablet by mouth every 8 hours Hold if SBP < 120 (Patient taking differently: Take 50 mg by mouth every 8 hours Indications: High Blood Pressure Disorder Hold if SBP < 120) 90 tablet 3    potassium chloride (KLOR-CON M) 20 MEQ extended release tablet Take 1 tablet by mouth daily (Patient taking differently: Take 20 mEq by mouth daily Indications: Nutritional Support ) 60 tablet 3    dicyclomine (BENTYL) 10 MG capsule Take 1 capsule by mouth every 6 hours as needed (cramps) (Patient taking differently: Take 10 mg by mouth every 6 hours as needed (cramps) Indications: Cramping Pain in the Abdomen ) 20 capsule 0    albuterol sulfate HFA (VENTOLIN HFA) 108 (90 Base) MCG/ACT inhaler Inhale 2 puffs into the lungs as needed for Wheezing Every 4-6 hours PRN (Patient taking differently: Inhale 2 puffs into the lungs as needed for Wheezing Indications: Chronic Obstructive Lung Disease Every 4-6 hours PRN ) 1 each 3    albuterol (PROVENTIL) (2.5 MG/3ML) 0.083% nebulizer solution Take 3 mLs by nebulization every 6 hours as needed for Wheezing 120 each 3    montelukast (SINGULAIR) 10 MG tablet Take 1 tablet by mouth nightly (Patient taking differently: Take 10 mg by mouth nightly Indications: Seasonal Allergy ) 30 tablet 3    insulin glargine (LANTUS SOLOSTAR) 100 UNIT/ML injection pen 60 units at bedtime (Patient taking differently: Inject 60 Units into the skin nightly Indications: Type 2 Diabetes 60 units at bedtime) 15 pen 3    insulin lispro, 1 Unit Dial, (HUMALOG KWIKPEN) 100 UNIT/ML SOPN 15  units at each meals (Patient taking differently: Inject 15 Units into the skin 3 times daily (before meals) Indications: Type 2 Diabetes 15  units at each meals) 10 pen 03    Dulaglutide (TRULICITY) 6.79 HH/8.2CG SOPN Inject 0.75 mg into the skin once a week (Patient taking differently: Inject 0.75 mg into the skin once a week Indications: Type 2 Diabetes ) 4 pen 3    levothyroxine (SYNTHROID) 50 MCG tablet take 1 tablet by mouth once daily (Patient taking differently: Take 50 mcg by mouth Daily Indications: Underactive Thyroid take 1 tablet by mouth once daily) 30 tablet 5    oxybutynin (DITROPAN-XL) 5 MG extended release tablet take 1 tablet by mouth once daily (Patient taking differently: Take 5 mg by mouth daily Indications: Bladder Spasm ) 90 tablet 3    busPIRone (BUSPAR) 10 MG tablet Take 10 mg by mouth in the morning and at bedtime Indications: Schizophrenia       prazosin (MINIPRESS) 2 MG capsule Take 2 mg by mouth nightly Indications: High Blood Pressure Disorder       ammonium lactate (LAC-HYDRIN) 12 % lotion Apply topically daily as needed for Dry Skin       atorvastatin (LIPITOR) 80 MG tablet Take 80 mg by mouth nightly Indications: High Amount of Fats in the Blood       diclofenac sodium (VOLTAREN) 1 % GEL Apply 2 g topically Indications: Pain       doxepin (SINEQUAN) 25 MG capsule Take 25 mg by mouth nightly Indications: Depression       ranolazine (RANEXA) 1000 MG extended release tablet Take 1 tablet by mouth 2 times daily (Patient taking differently: Take 1,000 mg by mouth 2 times daily Indications: Angina Pectoris ) 60 tablet 3    gabapentin (NEURONTIN) 600 MG tablet Take 600 mg by mouth 2 times daily.  Indications: Nerve Disease       RESTASIS 0.05 % ophthalmic emulsion Place 1 drop into both eyes nightly Indications: Dry Eyes       neomycin-polymyxin-dexameth (MAXITROL) 3.5-18045-0.1 ophthalmic suspension Place 1 drop into both eyes 4 times daily Indications: Glaucoma  (Patient not taking: Reported on 5/12/2022)      ARTIFICIAL TEARS 1.4 % ophthalmic solution Place 1 drop into both eyes in the morning and at bedtime       docusate sodium (COLACE, DULCOLAX) 100 MG CAPS Take 100 mg by mouth 2 times daily (Patient taking differently: Take 200 mg by mouth nightly Indications: Constipation At bedtime.) 60 capsule 1    sertraline (ZOLOFT) 100 MG tablet Take 100 mg by mouth daily Indications: Depression       ticagrelor (BRILINTA) 90 MG TABS tablet Take 90 mg by mouth 2 times daily Indications: Diabetes       CPAP Machine MISC by Does not apply route New CPAP with 10 cm 1 each 0    aspirin 81 MG EC tablet Take 1 tablet by mouth daily (Patient taking differently: Take 81 mg by mouth daily Indications: Peripheral Vascular Disease ) 30 tablet 3    OXYGEN 2 lit O2 with sleep , please give O2 concentrator 1 Units 0    Emollient (EUCERIN INTENSIVE REPAIR HAND) 2.5-10 % CREA APPLY TO FEET AT BEDTIME; PLACE SOCKS OVER FEET AFTER APPLICATION IF NEEDED FOR DRY CRACKING FEET      hydrOXYzine (VISTARIL) 25 MG capsule Take 50 mg by mouth 3 times daily as needed for Anxiety       Nutritional Supplements (GLUCERNA SHAKE) LIQD take as directed three times a day      isosorbide dinitrate (ISORDIL) 20 MG tablet Take 1 tablet by mouth 3 times daily (Patient taking differently: Take 20 mg by mouth 3 times daily Indications: Schizophrenia ) 90 tablet 3    nitroGLYCERIN (NITROSTAT) 0.4 MG SL tablet up to max of 3 total doses. If no relief after 1 dose, call 911.  (Patient not taking: Reported on 5/12/2022) 25 tablet 3    haloperidol (HALDOL) 5 MG tablet Take 5 mg by mouth nightly Indications: Schizophrenia       folic acid (FOLVITE) 1 MG tablet Take 1 mg by mouth daily Indications: Anemia       Iron Polysacch Rxorq-M14-KF (NIFEREX-150 FORTE PO) Take 1 tablet by mouth daily       Cyanocobalamin (VITAMIN B-12) 1000 MCG extended release tablet Take 1,000 mcg by mouth daily Indications: Anemia       meclizine (ANTIVERT) 25 MG tablet Take 25 mg by mouth 2 times daily Indications: Sensation of Spinning or Whirling          Allergies:  Ambien [zolpidem tartrate], Aripiprazole, Capoten [captopril], Clioquinol, Clonazepam, Cogentin [benztropine], Depakote [divalproex sodium], Effexor xr [venlafaxine hcl er], Geodon [ziprasidone hcl], Lisinopril, Lyrica [pregabalin], Navane [thiothixene], Pamelor [nortriptyline hcl], Remeron [mirtazapine], Risperdal [risperidone], Seroquel [quetiapine], Trazodone and nefazodone, Wellbutrin [bupropion], Ziprasidone, and Zolpidem    Social History:    Social History     Socioeconomic History    Marital status:      Spouse name: Not on file    Number of children: Not on file    Years of education: Not on file    Highest education level: Not on file   Occupational History    Not on file   Tobacco Use    Smoking status: Never Smoker    Smokeless tobacco: Never Used   Vaping Use    Vaping Use: Never used   Substance and Sexual Activity    Alcohol use: Never    Drug use: Never    Sexual activity: Not on file   Other Topics Concern    Not on file   Social History Narrative         Lives With: Spouse    Type of Home: [address]: One level    Home Access: Elevator    Bathroom Shower/Tub: Tub/Shower unit(Simultaneous filing. User may not have seen previous data.)    Bathroom Equipment: Grab bars in shower, Shower chair    Home Equipment: Rolling walker, [address] 450 bed    ADL Assistance: Needs assistance    Homemaking Assistance: Needs assistance    Homemaking Responsibilities: No    Ambulation Assistance: Independent    Transfer Assistance: Independent    Active : No    Additional Comments: Pt wears orthopedic shoes at baseline    The patient states she is current with [facility](RN per the ). The patient had a walker, but obtained a hospital bed, wheel chair, BSC and O2 last admission (from [address]). pharmacy is [address] [place] [address],[address] [address] [facility]	Moe Wise () per the patient     Social Determinants of Health     Financial Resource Strain:     Difficulty of Paying Living Expenses: Not on file   Food Insecurity:     Worried About [address] in the Last Year: Not on file    Shayy of Food in the Last Year: Not on file   Transportation Needs:     Lack of Transportation (Medical): Not on file    Lack of Transportation (Non-Medical):  Not on file   Physical Activity:     Days of Exercise per Week: Not on file    Minutes of Exercise per Session: Not on file   Stress:     Feeling of Stress : Not on file   Social Connections:     Frequency of Communication with Friends and Family: Not on file    Frequency of Social Gatherings with Friends and Family: Not on file    Attends [employer] Services: Not on file    Active Member of Clubs or Organizations: Not on file    Attends Club or Organization Meetings: Not on file    Marital Status: Not on file   Intimate Partner Violence:     Fear of Current or Ex-Partner: Not on file    Emotionally Abused: Not on file  Physically Abused: Not on file    Sexually Abused: Not on file   Housing Stability:     Unable to Pay for Housing in the Last Year: Not on file    Number of Places Lived in the Last Year: Not on file    Unstable Housing in the Last Year: Not on file       Family History:   History reviewed. No pertinent family history.     Review of Systems:   Positives in bold  Constitutional: fever, chills, fatigue, malaise   HENT:  rhinorrhea, sinus pain, sore throat, epistaxis    Eyes:  photophobia, visual disturbance, eye redness  Respiratory: shortness of breath, cough, hemoptysis    Cardiovascular: chest pain, palpitations, orthopnea, leg swelling   Gastrointestinal: abdominal pain, nausea, vomiting, diarrhea, constipation   Endocrine: polydipsia, polyphagia, cold intolerance, heat intolerance  Genitourinary: dysuria, flank pain, frequency, urgency   Hematologic: easy bruising, easy bleeding  Musculoskeletal: back pain, neck pain, myalgias, arthalgias  Neurological: syncope, lightheadedness, dizziness, seizures, tremors, weakness  Psychiatric/Behavioral: anxiety, depression, hallucinations  Skin: rash, itching    Physical exam:   Constitutional:  VITALS:  BP (!) 115/96   Pulse (!) 47   Temp 97.2 °F (36.2 °C) (Axillary)   Resp 16   Ht 5' 4\" (1.626 m)   Wt 261 lb (118.4 kg)   SpO2 100%   BMI 44.80 kg/m²     General: alert, in no apparent distress  HEENT: normocephalic, atraumatic, anicteric  Neck: supple, no mass  Lungs: non-labored respirations, clear to auscultation bilaterally  Heart: regular rate and rhythm, no murmurs or rubs  Abdomen: soft, non-tender, non-distended  MSK: no joint swelling or tenderness  Ext: no cyanosis, no peripheral edema  Neuro: alert and oriented, no gross abnormalities  Psych: normal mood and affect    Data/  CBC:   Lab Results   Component Value Date    WBC 8.5 05/19/2022    RBC 3.64 05/19/2022    HGB 8.9 05/19/2022    HCT 26.8 05/19/2022    MCV 73.8 05/19/2022    MCH 24.4 05/19/2022 MCHC 33.1 05/19/2022    RDW 19.6 05/19/2022     05/19/2022     BMP:    Lab Results   Component Value Date     05/19/2022    K 6.2 05/19/2022    CL 92 05/19/2022    CO2 27 05/19/2022    BUN 37 05/19/2022    LABALBU 2.8 05/19/2022    LABALBU <7.0 01/06/2020    CREATININE 2.53 05/19/2022    CALCIUM 8.9 05/19/2022    GFRAA 23.1 05/19/2022    LABGLOM 19.1 05/19/2022    GLUCOSE 133 05/19/2022    GLUCOSE 279 01/06/2020     ECHO Complete 2D W Doppler W Color    Result Date: 4/27/2022  Transthoracic Echocardiography Report (TTE)  Demographics   Patient Name   Jolie Rendon       Gender               Female                 Jocelin Lazaro   Patient Number [id number]            Race                 Other                                      Ethnicity             or    Visit Number   [id number]           Room Number          IC07   Corporate ID                       Date of Study        04/27/2022   Accession      [id number]          Referring Physician  Number   Date of Birth  1955          Sonographer          Kendralisha Garzon   Age            61 year(s)          Interpreting         [address]                                     Physician            Bud Finder MD  Procedure Type of Study   TTE procedure:ECHO COMPLETE 2D W/DOP W/COLOR. Procedure Date Date: 04/27/2022 Start: 09:55 AM Study Location: Portable Technical Quality: Adequate visualization Indications:Congestive heart failure. Patient Status: Routine Height: 64 inches Weight: 242 pounds BSA: 2.12 m^2 BMI: 41.54 kg/m^2 BP: 104/46 mmHg  Conclusions   Summary  Left ventricular ejection fraction is visually estimated at 45-50%. Near akinesis of septum, hypokinesis of septal aspect of apex and distal  most anteroseptal wall. Overall LVEF seems a bit better than 8/2021  Right ventricle global systolic function is mildly reduced . Moderately enlarged right ventricle cavity.   Right ventricular systolic pressure of 47 mm Hg consistent with moderate pulmonary hypertension. 8/2021 RVSP was 60 mm Hg  Mildly dilated left atrium. Markedly enlarged right atrium size. Normal mitral valve structure  3+ MR  Normal aortic valve structure and function. Normal tricuspid valve structure  Severe tricuspid regurgitation. Signature   ----------------------------------------------------------------  Electronically signed by Gail Byrd MD(Interpreting  physician) on 04/27/2022 05:08 PM  ----------------------------------------------------------------   Findings  Left Ventricle Left ventricular ejection fraction is visually estimated at 45-50%. Near akinesis of septum, hypokinesis of septal aspect of apex and distal most anteroseptal wall. Overall LVEF seems a bit better than 8/2021 Right Ventricle Right ventricle global systolic function is mildly reduced . Moderately enlarged right ventricle cavity. Right ventricular systolic pressure of 47 mm Hg consistent with moderate pulmonary hypertension. 8/2021 RVSP was 60 mm Hg Left Atrium Mildly dilated left atrium. Right Atrium Markedly enlarged right atrium size. Mitral Valve Normal mitral valve structure 3+ MR Tricuspid Valve Normal tricuspid valve structure Severe tricuspid regurgitation. Aortic Valve Normal aortic valve structure and function. Pulmonic Valve The pulmonic valve was not well visualized . Pericardial Effusion No evidence of pericardial effusion. Pleural Effusion No evidence of pleural effusion. Aorta \ Miscellaneous Aortic root dimension within normal limits. M-Mode Measurements (cm)   LVIDd: 4.63 cm                         LVIDs: 3.58 cm  IVSd: 1.18 cm                          IVSs: 1.45 cm  LVPWd: 1.42 cm                         AO Root Dimension: 2.54 cm  Rt. Vent.  Dimension: 3.32 cm                                         LVOT: 1.98 cm  Doppler Measurements:   TR Velocity:3.04 m/s  TR Gradient:37.03 mmHg                                     Estimated RAP:10 mmHg RVSP:47.03 mmHg  Valves  Tricuspid Valve   Estimated RVSP: 47.03 mmHg              Estimated RAP: 10 mmHg  TR Velocity: 3.04 m/s                   TR Gradient: 37.03 mmHg   Pulmonic Valve            Estimated PASP: 47.03 mmHg   LVOT   LVOT Diameter: 1.98 cm  Structures  Left Ventricle   Diastolic Dimension: 9.74 cm         Systolic Dimension: 4.27 cm  Septum Diastolic: 1.34 cm            Septum Systolic: 0.20 cm  PW Diastolic: 9.91 cm                                       FS: 22.7 %  LV EDV/LV EDV Index: 98.85 ml/47 m^2 LV ESV/LV ESV Index: 53.69 ml/25 m^2  EF Calculated: 45.7 %   LVOT Diameter: 1.98 cm   Right Atrium   RA Systolic Pressure: 10 mmHg   Right Ventricle   Diastolic Dimension: 9.19 cm                                    RV Systolic Pressure: 68.59 mmHg  Aorta/ Miscellaneous Aorta   Aortic Root: 2.54 cm  LVOT Diameter: 1.98 cm      CT ABDOMEN PELVIS WO CONTRAST Additional Contrast? Radiologist Recommendation    Result Date: 5/2/2022  COMPARISON: April 20, 2022; April 23, 2022 HISTORY:  Severe abdominal pain s/p Lap Teri 4/25 COMMENTS: R10.11 Abdominal pain, right upper quadrant ICD10 TECHNIQUE: procedure Thin slice spiral CT was performed from the lung bases through the iliac crests without contrast administration. Contrast enhancement was not employed due to clinician's order. Sagittal and coronal reconstructions were also performed. FINDINGS: Images through the lung bases demonstrate groundglass opacities and/or atelectasis. In the subcutaneous fat over the lateral aspect of the lower chest and abdomen there is infiltration and also a fluid collection is seen adjacent to the muscle that measures 12.4 x 4.9 x 15.27 cm. The gallbladder is surgically absent and a drain is seen in the gallbladder fossa exiting the anterior abdomen on the right. Non-contrast images of the liver shows that it appears to be decreased in attenuation with no intrahepatic ductal dilation seen.  The pancreas, spleen and adrenals are unremarkable. No hydro-nephrosis, renal calculi or ijeoma-nephric fluid seen in the kidneys. Mild perinephric stranding is seen about both kidneys. No aneurysm  is visualized. Diffuse atherosclerotic calcifications are visualized. No  dilated loops of bowel, free air or free fluid is seen there is mild stranding seen lateral to the descending colon. The visualized portion of the appendix is unremarkable. . An umbilical hernia is seen that contains fat. A Colon catheter is seen in the urinary bladder. No destructive bony lesions are seen. There is evidence of median sternotomy. 1. Fluid collection is seen over the lower lateral aspect of the chest and also abdomen adjacent to the muscle. This could represent abscess. No contrast was administered. There also is infiltration of the fat anterior to the fluid collection. 2. No evidence for appendicitis, diverticulitis or obstruction 3. Patient is status post cholecystectomy and drains are seen in place on the right. All CT scans at this facility use dose modulation, iterative reconstruction, and/or weight based dosing when appropriate to reduce radiation dose to as low as reasonably  achievable. CT ABDOMEN PELVIS WO CONTRAST Additional Contrast? None    Result Date: 4/23/2022  EXAM:  CT ABDOMEN PELVIS WO CONTRAST History: Abdominal pain Technique: Multiple contiguous axial images were obtained of the abdomen and pelvis from the level of the lung bases through the ischial tuberosities without contrast. Multiplanar reformats were obtained. Comparison: CT abdomen pelvis April 20, 2022 Findings: Lung bases are clear. Heart size is enlarged. Mitral annular calcification. Evidence of prior thoracic surgery. Lack of intravenous contrast precludes optimal evaluation of the abdominal and pelvic viscera. The unenhanced liver, spleen, stomach, pancreas, and adrenal glands appear within normal limits.  The gallbladder is mildly distended but not significant changed from recent CT. There are debris is present within the gallbladder lumen. No gallbladder wall thickening or pericholecystic fluid identified by CT. Mild bilateral perinephric stranding. No urinary tract calculi or hydronephrosis. The urinary bladder is decompressed. The uterus is absent. Abdominal aorta is nonaneurysmal  . No retroperitoneal or abdominal/pelvic lymphadenopathy. No small bowel obstruction. No overt colonic mass or pericolonic inflammation. No findings of acute appendicitis No free fluid, loculated fluid collection, or pneumoperitoneum. No acute osseous abnormality. Mildly distended gallbladder containing gallbladder sludge and/or tiny gallstones without significant interval change since April 20, 2022 CT. No CT findings of acute cholecystitis. Mild bilateral perinephric stranding is nonspecific. Correlation for polynephritis is recommended. All CT scans at this facility use dose modulation, iterative reconstruction, and/or weight based dosing when appropriate to reduce radiation dose to as low as reasonably achievable. XR CHEST (2 VW)    Result Date: 4/28/2022  EXAMINATION: Chest x-ray, 2 view HISTORY: Acute respiratory failure TECHNIQUE: Frontal and lateral views of the chest COMPARISON: Portable chest radiograph April 27, 2022 FINDINGS: Interval removal of the endotracheal tube and enteric tube. Right and left jugular catheters remain. Heart size is enlarged. Interval development of right midlung and right lung base opacities. No significant interval change of left lung opacities. No pneumothorax. No acute osseous abnormality. Interval development of right lung infiltrate and/or atelectasis. Otherwise no significant interval change. XR ABDOMEN (KUB) (SINGLE AP VIEW)    Result Date: 4/30/2022  EXAMINATION: XR ABDOMEN (KUB) (SINGLE AP VIEW) CLINICAL HISTORY:  abd pain COMPARISONS:  NONE AVAILABLE TECHNIQUE:  Anterior x-ray views of the abdomen and pelvis.  FINDINGS: There is a surgical drain in the right upper quadrant. There is a Colon catheter in place. There are surgical skin staples in the midline and in the left upper and left lower quadrants. The bowel gas pattern is without evidence of obstruction or distended bowel. There is retained fecal material throughout the colon consistent with constipation. No abnormal calcifications. The soft tissues are within normal limits. No acute osseous findings. There are no acute intra-abdominal changes. There is a surgical drain in the right upper quadrant and there are surgical skin staples at several locations in the anterior abdomen. CT Head WO Contrast    Result Date: 5/18/2022  EXAMINATION:  CT HEAD WO CONTRAST HISTORY:  Altered mental status. Confusion. This TECHNIQUE:  Serial axial images without IV contrast were obtained from the vertex to the foramen magnum. Sagittal and coronal reconstructions. All CT scans at this facility use dose modulation, iterative reconstruction, and/or weight based dosing when appropriate to reduce radiation dose to as low as reasonably achievable. COMPARISON:  4/27/2022. RESULT: Acute change:   No evidence of an acute infarct or other acute parenchymal process. Hemorrhage:    No evidence of acute intracranial hemorrhage. Mass Lesion / Mass Effect:   There is no evidence of an intracranial mass or extraaxial fluid collection. No significant mass effect. Chronic change:   None apparent. Parenchyma: There is no significant volume loss for age. The brain parenchyma is otherwise within normal limits for age. Vascular calcifications Ventricles: The ventricles are within normal limits of size and configuration for age. Paranasal sinuses and skull base:  Mild mucosal thickening ethmoid air cells. Mastoid air cells are clear. The skull base is unremarkable. Bilateral lens replacement surgery. No acute intracranial process. CT HEAD WO CONTRAST    Result Date: 4/27/2022  CT Brain.  Contrast medium:  without contrast.. History:  Nonresponsive. Technical factors: CT imaging of the brain was obtained and formatted as 5 mm contiguous axial images. 2.5 mm contiguous axial images were obtained through the osseous structures. Sagittal and coronal reconstruction obtained during postprocessing. Comparison:  CT brain May 6, 2020, April 21, 2020. MRI brain May 7, 2020. Findings: Extra-axial spaces:  Normal. Intracranial hemorrhage:  None. Ventricular system: [Negative. Basal Cisterns:  Without anomaly. Cerebral Parenchyma: Without anomaly. Midline Shift:  None. Cerebellum:  No anomaly identified. Paranasal sinuses and mastoid air cells:  Small air-fluid levels, bilateral maxillary sinuses, left sphenoid sinus. Partial opacification ethmoid sinuses. Visualized Orbits:  Negative. Impression: Pansinusitis. All CT scans at this facility use dose modulation, iterative reconstruction, and/or weight based dosing when appropriate to reduce radiation dose to as low as reasonably achievable. CTA CHEST W WO CONTRAST    Result Date: 4/30/2022  EXAM:CTA CHEST W WO CONTRAST DATE4/30/2022 8:51 AM: REASON FOR EXAM:Acute severe anterior chest wall pain. r/o PE COMPARISON: none Technique: Helical CT was performed through the chest following the IV administration of100  cc of nonionic contrast. 3-D MIP reconstructions were performed on an independent workstation in the coronal plane with thick slab technique utilized in the interpretation. 3-D maximum intensity projection performed. All CT scans at this facility use dose modulation, iterative reconstruction, and/or weight based dosing when appropriate to reduce radiation dose to as low as reasonably achievable. CHEST CTA  FINDINGS: Mediastinum: There are mild hilar and mediastinal lymph nodes, likely related to reactive nodes. The chest wall and lower neck are normal Heart: The heart is enlarged, cardiomegaly. There is no pericardial effusion. Vascular structures: There is no evidence for thoracic aortic aneurysm or dissection. No evidence of traumatic aortic injury. There are no persistent filling defects within the pulmonary arteries. Lungs: There are patchy groundglass alveolar alveolar opacities in both lungs predominantly in the lung bases which are nonspecific for alveolitis, pneumonitis. Pleura: No pleural effusion or thickening. Upper abdomen: The visualized portions of the upper abdomen are unremarkable. Bones/axillae/soft tissues: Osseous structures and chest wall are normal.     Negative CTA of the chest. No evidence of thoracic aortic dissection or aneurysm. The study is negative for pulmonary emboli. There are nonspecific groundglass opacities in both lungs which may secondary to inflammation, infection. There are shotty mediastinal lymphadenopathy. NM LUNG SCAN PERFUSION ONLY    Result Date: 4/28/2022  EXAMINATION: PERFUSION ONLY SCINTIGRAPHY CLINICAL HISTORY: 62-year-old with new onset acidosis, worsening renal failure and cardiac disease. Patient has been intubated. TECHNIQUE: 5.1 mCi of technetium labeled MAA were utilized for pulmonary perfusion only scintigraphy. Planar images of the chest were obtained in the anterior, posterior, lateral, and oblique planes. Comparison made with a AP chest x-ray from 4/28/2022 which demonstrates bilateral pulmonary opacities in both mid and lower lung zones. FINDINGS: Perfusion images are abnormal. There are perfusion defects involving the lingula and basilar segments of the left lower lobe. There are also perfusion defects involving much of the right lower lobe. No ventilation images to assess for a matching defects. ABNORMAL PERFUSION SCINTIGRAPHY WITH BILATERAL PERFUSION DEFECTS. THEREFORE, THE STUDY IS INDETERMINATE FOR ASSESSMENT OF ACUTE PULMONARY EMBOLUS    CT ABDOMEN PELVIS W IV CONTRAST Additional Contrast? None    Result Date: 5/17/2022  EXAMINATION:  CT ABDOMEN AND PELVIS. CLINICAL HISTORY:  EPIGASTRIC PAIN. COMPARISONS:  5-22. TECHNIQUE:  Serial 5 mm scans were obtained through the abdomen and pelvis after injection of IV contrast. FINDINGS:  There is slight pleural reaction in both lower thoraces. There would appear to be diffuse fatty infiltration of the liver. The spleen and pancreas appear normal. Patient has had a cholecystectomy. Kidneys and adrenals appear normal. There is a  small amount of free fluid in the upper abdomen at this time. No definite inflammatory change is demonstrated in the abdomen or pelvis. Marked amount of soft tissue induration, particularly on the left is again noted. This seems a little more prominent than it did on the previous exam. The aorta is small in caliber and there is a tremendous amount of vascular calcification noted. No significant bony abnormality. 1. SMALL AMOUNT OF FREE FLUID IN THE UPPER ABDOMEN. 2. MARKED AMOUNT OF SOFT TISSUE INDURATION IN THE ENTIRE ABDOMINAL WALL. THIS IS MORE PRONOUNCED ON THE LEFT AND PERHAPS A LITTLE MORE EXTENSIVE THAN ON THE PREVIOUS EXAM. AN INFLAMMATORY PROCESS OR ABSCESS CANNOT BE EXCLUDED. CT ABDOMEN PELVIS W IV CONTRAST Additional Contrast? None    Result Date: 4/20/2022  EXAMINATION: CT ABDOMEN PELVIS W IV CONTRAST DATE AND TIME:4/20/2022 7:19 AM CLINICAL HISTORY: Acute abdominal pain. Epigastric pain. abd and chest pain x 2 days  COMPARISON: August 15, 2021 TECHNIQUE: Contiguous axial CT sections of the abdomen and pelvis. 100 cc's of IV contrast given. .  All CT scans at this facility use dose modulation, iterative reconstruction, and/or weight based dosing when appropriate to reduce radiation dose to as low as reasonably achievable. Some of this report was completed using  [employer] [id number] and may include unintended errors with respect to translation of words, typographical errors or grammatical errors which may not have been identified prior to the finalization of this report.  FINDINGS: Incidental gallstones again noted the visualized lung bases with trace bilateral pleural effusions. Dense mitral annular calcification. Coronary calcification. Small volume ascites. Nonobstructing bilateral renal calculi. Delayed nephrograms from contrast enhanced study yesterday suggestive of decreased renal function. Correlate with renal function tests. Bladder decompressed with Colon catheter with air in the bladder likely secondary to placement. There is concern for cystitis, please correlate with urinalysis. Nonspecific patchy opacity within the lung bases may be infectious/inflammatory or could relate aspiration. Trace bilateral pleural effusions. Diffuse soft tissue edema/anasarca with focal areas of fluid within the left abdominal wall musculature, unchanged. Again differential includes hematoma/stroma, and abscess ==========     XR CHEST PORTABLE    Result Date: 4/27/2022  EXAMINATION: XR CHEST PORTABLE CLINICAL HISTORY: RIGHT LINE PLACED COMPARISONS: APRIL 27, 2022, 0935 HOURS, APRIL 20, 2022 FINDINGS: Tip of endotracheal tube 4.2 cm superior to denise. Nasogastric tube courses into stomach. Tip right jugular vein central line at cephalad margin superior vena cava. Tip of left jugular vein central line in left brachiocephalic vein. Median sternotomy. Cardiopericardial silhouette enlarged. Ill-defined area of increased opacity left mid left lower lung. Right lung clear. LEFT MID/LOWER LUNG ATELECTASIS/PNEUMONIA. XR CHEST PORTABLE    Result Date: 4/27/2022  EXAMINATION: Portable AP ERECT view of the chest. CLINICAL HISTORY:  ETT and Central line placement DATE: 4/27/2022 9:37 AM COMPARISONS: 4/20/2022 FINDINGS: Film(s) was obtained with a poor depth of inspiration. The heart is moderately enlarged, unchanged. There are mitral valve annular calcifications, seen previously. There are multiple sternal sutures and mediastinal clips present. The end of endotracheal tube lies 3.5 cm above the denise.  The gastric catheter extends into the stomach. There are atherosclerotic calcifications in the aortic arch. This mild degree of Central vascular congestion. Small infiltrate/atelectasis in lung bases, left greater than right. No pleural effusion or pneumothorax. There are mild degenerative changes in spine. MODERATE CARDIAC ENLARGEMENT UNCHANGED. MILD CENTRAL VASCULAR CONGESTION. BIBASILAR INFILTRATES, LEFT GREATER THAN RIGHT. XR CHEST PORTABLE    Result Date: 4/20/2022  EXAMINATION: XR CHEST PORTABLE CLINICAL HISTORY: CHEST AND ABDOMINAL PAIN FOR 2 DAYS COMPARISONS: CHEST RADIOGRAPH NOVEMBER 16, 2021, CT CHEST NOVEMBER 13, 2021 FINDINGS: Median sternotomy. Cardiopericardial silhouette upper limits normal, unchanged. Ill-defined areas increase opacity lower lung zones bilaterally. BILATERAL LOWER LUNG ATELECTASIS/PNEUMONIA    IR PICC WO SQ PORT/PUMP > 5 YEARS    Result Date: 5/2/2022  EXAMINATION: IR PICC WO SQ PORT/PUMP > 5 YEARS DATE AND TIME:4/29/2022 5:12 PM CLINICAL HISTORY: R10.11 Abdominal pain, right upper quadrant ICD10   access  COMPARISON: None available. Radiation dose: 43.8 mGy. After discussing the procedure and possible complications with the patient, informed consent was obtained. The patient was placed on the Special Procedures table. The right upper extremity was sterilely prepared using 2% chlorhexidine and then draped with sterile towels and a body-sized sterile drape. All personnel in the Special Procedures Room donned caps and surgical masks. In addition, the  and first assistant donned sterile gowns and gloves after proper hand cleansing. A pre-procedure time out was performed in order to assure the correct patient and procedure. Local anesthetic was administered. A peripheral vein was accessed with sonographic guidance. A sonographic spot image was obtained for documentation. A guidewire was advanced into the vein with fluoroscopic guidance and a sheath was placed over the guidewire.   A 5-Amharic dual-lumen PICC was advanced through the sheath, into the brachial, up the arm and into the central vasculature. It was positioned appropriately. The sheath was removed. The catheter was shown to aspirate and infuse properly. The flange of the catheter was affixed to the arm using a PICC securement device. A spot image of the chest showed the tip of the PICC line to lie in the right atrium. The patient tolerated the procedure well and without complications. CONCLUSION: SUCCESSFUL PICC PLACEMENT WITHOUT IMMEDIATE COMPLICATIONS. US ABDOMEN LIMITED    Result Date: 4/23/2022  EXAM: US ABDOMEN LIMITED HISTORY: Right upper quadrant pain TECHNIQUE: Ultrasound evaluation was performed of the right upper quadrant of the abdomen. COMPARISON: CT abdomen pelvis April 23, 2022 FINDINGS: Normal echogenicity and contour of the liver. No liver lesion or intrahepatic biliary dilatation. Main portal vein is patent. The gallbladder is mildly distended. Layering echogenic material is identified within the lumen of the gallbladder. No pericholecystic fluid. Gallbladder wall thickness is at the upper limits of normal measuring 2.7 mm. Negative Velez sign reported. Common bile duct is normal in diameter measuring 4 mm. No overt abnormality of the pancreas. Nonspecific findings of the gallbladder including mildly distended gallbladder containing layering sludge and/or tiny gallstones with wall thickness of the upper limits of normal measuring 2.7 mm. No pericholecystic fluid to suggest acute cholecystitis. FL CHOLANGIOGRAM OR    Result Date: 4/25/2022  EXAMINATION: FL CHOLANGIOGRAM OR CLINICAL HISTORY:  pain COMPARISONS: None available. FINDINGS: Fluoroscopic assistance was provided during intraoperative cholangiogram. There is contrast opacification of the intrahepatic bile ducts, common hepatic duct, the distal cystic duct, and common bile duct. There is spillage of contrast into the duodenum.  There are no persistent filling defects. Number of images: 183 The total radiation time is 12.3 seconds Radiation Dose is 2.71 rad. Fluoroscopic assistance was provided during intraoperative cholangiogram. Please refer to operative report. US DUP LOWER EXTREMITIES BILATERAL VENOUS    Result Date: 4/27/2022  EXAMINATION: US DUP LOWER EXTREMITIES BILATERAL VENOUS CLINICAL HISTORY: Shortness of breath. Bilateral leg pain and swelling. COMPARISON: None TECHNIQUE: The bilateral lower extremity veins were evaluated with color doppler, gray scale imaging and spectral analysis while using compression and augmentation when possible. RESULT: Evaluation of the bilateral lower extremity veins from the thigh to the knee shows normal phasic flow, normal augmentation of the Doppler signal, and normal compression of the deep veins. There is no sonographic evidence for acute deep venous thrombosis from the bilateral groin to the popliteal region. There is no sonographic evidence for acute deep venous thrombosis in the bilateral segmentally visualized calf veins. No acute DVT of the bilateral lower extremity veins from the groin to the knee. There is no sonographic evidence for acute deep venous thrombosis in the bilateral segmentally visualized calf veins. Assessment:  61 y.o. female with history s/f T2DM, CKD stage III, CVA,CAD s/p CABG, HTN, morbid obesity, schizophrenia who presented w/ hypoglycemia and hematuria from [facility]    1. RADHA on CKD stage III: 2/2 hypotension and contrast induced nephropathy from CT A/P (5/17), baseline Scr ~1-1.2 w/ eGFR mid 40s/low 50s, gets recurrent AKIs, CKD risk factors of T2DM, HTN, CAD  2. Hyperkalemia: on K supplementation as outpatient, also in setting of RADHA  3. Hyponatremia: in setting of renal failure  4. Hypotension  5. UTI  6. Hypoalbuminemia  7. Transaminitis  8. Respiratory acidosis   9. Fluid overload: significantly   10. Encephalopathy: intermittent, ?  Related to infection Plan:  - holding off on HD for now  - agree w/ giving lokelma  - decrease gabapentin 300 mg QHS  - giving lasix 80 mg IV x1 w/ repeat BMP Q6H       Thank you for the consultation. Will continue to follow  Please do not hesitate to call with questions.     Jaquelin Irving MD, MD

## 2022-05-19 NOTE — CONSULTS
[facility] ATTENDING INPATIENT  CONSULTATION NOTE                                                                                                                                                                                                Reason for Consult  Indwelling Colon, hematuria    History of Present Illness  57-year-old female with history of asthma, coronary artery disease, schizophrenia, congestive heart failure, chronic kidney disease stage III, diabetes, CVA, hypertension, hypothyroidism, who was admitted through the ER yesterday for hemorrhagic cystitis secondary to UTI. Currently the patient urine it seems to be clearing  Minimal urine in the bag      Urologic Review of Systems/Symptoms  Other Urologic: Previous history of urinary retention with failed trial of void last admission    Review of Systems    All 14 categories of Review of Systems otherwise reviewed no other findings reported.     Past Medical History:   Diagnosis Date    Asthma     CAD (coronary artery disease)     CKD (chronic kidney disease) stage 3, GFR 30-59 ml/min ([facility])     Colitis     Diabetes mellitus ([address]     Hyperlipidemia     Hypertension     PAD (peripheral artery disease) ([facility])     Prolonged emergence from general anesthesia     [facility]ipheral vascular [facility]) [facility]     Thyroid disease      Past Surgical History:   Procedure Laterality Date    CARDIAC SURGERY      CATARACT REMOVAL WITH IMPLANT Bilateral 11- 11-     SECTION      CHOLECYSTECTOMY, LAPAROSCOPIC N/A 2022    LAPAROSCOPIC CHOLECYSTECTOMY performed by Tamie Arreola MD at [address] [place] [address] N/A 2019    COLONOSCOPY DIAGNOSTIC performed by Ave Hatfield MD at [address] [place] [address]  2019    unknown vessels    HYSTERECTOMY      TOE AMPUTATION Right     3rd toe     Social History     Socioeconomic History    Marital status:      Spouse name: None    Number of children: None    Years of education: None    Highest education level: None   Occupational History    None   Tobacco Use    Smoking status: Never Smoker    Smokeless tobacco: Never Used   Vaping Use    Vaping Use: Never used   Substance and Sexual Activity    Alcohol use: Never    Drug use: Never    Sexual activity: None   Other Topics Concern    None   Social History Narrative         Lives With: Spouse    Type of Home: [address]: One level    Home Access: Elevator    Bathroom Shower/Tub: Tub/Shower unit(Simultaneous filing. User may not have seen previous data.)    Bathroom Equipment: Grab bars in shower, Shower chair    Home Equipment: Rolling walker, [address] bed    ADL Assistance: Needs assistance    Homemaking Assistance: Needs assistance    Homemaking Responsibilities: No    Ambulation Assistance: Independent    Transfer Assistance: Independent    Active : No    Additional Comments: Pt wears orthopedic shoes at baseline    The patient states she is current with [facility](RN per the ). The patient had a walker, but obtained a hospital bed, wheel chair, BSC and O2 last admission (from [address]). pharmacy is [address] [place] [address],[address] [address] [facility]. Transportation is provided by [employer] () per the patient     Social Determinants of Health     Financial Resource Strain:     Difficulty of Paying Living Expenses: Not on file   Food Insecurity:     Worried About [address] in the Last Year: Not on file    Shayy of Food in the Last Year: Not on file   Transportation Needs:     Lack of Transportation (Medical): Not on file    Lack of Transportation (Non-Medical):  Not on file   Physical Activity:     Days of Exercise per Week: Not on file    Minutes of Exercise per Session: Not on file   Stress:     Feeling of Stress : Not on file   Social Connections: ondansetron (ZOFRAN) injection 4 mg  4 mg IntraVENous Q6H PRN Cheril Alamin, APRN - NP        acetaminophen (TYLENOL) tablet 650 mg  650 mg Oral Q6H PRN Cheril Alamin, APRN - NP   650 mg at 05/19/22 1158    Or    acetaminophen (TYLENOL) suppository 650 mg  650 mg Rectal Q6H PRN Cheril Alamin, APRN - NP        senna (SENOKOT) tablet 8.6 mg  1 tablet Oral Daily PRN Cheril Alamin, APRN - NP        polyethylene glycol (GLYCOLAX) packet 17 g  17 g Oral Daily PRN Cheril Alamin, APRN - NP        cefTRIAXone (ROCEPHIN) 1000 mg IVPB in 50 mL D5W minibag  1,000 mg IntraVENous Q24H Cheril Alamin, APRN - NP   Stopped at 05/18/22 2124    0.9 % sodium chloride infusion   IntraVENous Continuous Cheril Alamin, APRN - NP 50 mL/hr at 05/19/22 0004 Rate Verify at 05/19/22 0004    glucose chewable tablet 16 g  4 tablet Oral PRN Cheril Alamin, APRN - NP        dextrose bolus 10% 125 mL  125 mL IntraVENous PRN Cheril Alamin, APRN - NP        Or    dextrose bolus 10% 250 mL  250 mL IntraVENous PRN Cheril Alamin, APRN - NP        glucagon (rDNA) injection 1 mg  1 mg IntraMUSCular PRN Cheril Alamin, APRN - NP        dextrose 5 % solution  100 mL/hr IntraVENous PRN Cheril Alamin, APRN - NP        aspirin EC tablet 81 mg  81 mg Oral Daily Cheril Alamin, APRN - NP   81 mg at 05/19/22 0932    [Held by provider] atorvastatin (LIPITOR) tablet 80 mg  80 mg Oral Nightly Cheril Alamin, APRN - NP        [Held by provider] busPIRone (BUSPAR) tablet 10 mg  10 mg Oral BID Cheril Alamin, APRN - NP        carvedilol (COREG) tablet 6.25 mg  6.25 mg Oral BID WC Cheril Alamin, APRN - NP        [Held by provider] doxepin (SINEQUAN) capsule 25 mg  25 mg Oral Nightly Cheril Alamin, APRN - NP        [Held by provider] furosemide (LASIX) tablet 40 mg  40 mg Oral QAM Cheril Alamin, APRN - NP        hydrALAZINE (APRESOLINE) tablet 50 mg  50 mg Oral 3 times per day ESDRAS Royal NP        insulin glargine (LANTUS) injection vial 60 Units  60 Units SubCUTAneous Nightly Redell Datil, APRN - NP   60 Units at 05/18/22 2047    insulin lispro (HUMALOG) injection vial 15 Units  15 Units SubCUTAneous TID AC Redell Datil, APRN - NP        iron polysaccaride (FERREX 150 FORTE PLUS) capsule 1 capsule  1 capsule Oral Daily Redell Datil, APRN - NP   1 capsule at 05/19/22 6751    isosorbide dinitrate (ISORDIL) tablet 20 mg  20 mg Oral TID Redell Datil, APRN - NP   20 mg at 05/19/22 0933    levothyroxine (SYNTHROID) tablet 50 mcg  50 mcg Oral Daily Redell Datil, APRN - NP   50 mcg at 05/19/22 4957    meclizine (ANTIVERT) tablet 25 mg  25 mg Oral BID Redell Datil, APRN - NP   25 mg at 05/19/22 0932    montelukast (SINGULAIR) tablet 10 mg  10 mg Oral Nightly Redell Datil, APRN - NP        oxybutynin (DITROPAN-XL) extended release tablet 5 mg  5 mg Oral Daily Redell Datil, APRN - NP   5 mg at 05/19/22 0932    ranolazine (RANEXA) extended release tablet 1,000 mg  1,000 mg Oral BID Redell Datil, APRN - NP   1,000 mg at 05/19/22 0932    [Held by provider] sertraline (ZOLOFT) tablet 100 mg  100 mg Oral Daily Redell Datil, APRN - NP        ticagrelor (BRILINTA) tablet 90 mg  90 mg Oral BID Redell Datil, APRN - NP   90 mg at 05/19/22 0933    prazosin (MINIPRESS) capsule 2 mg  2 mg Oral Nightly Redell Datil, APRN - NP        cycloSPORINE (RESTASIS) 0.05 % ophthalmic emulsion 1 drop  1 drop Both Eyes Nightly Redell Datil, APRN - NP   1 drop at 05/18/22 2055    nystatin (MYCOSTATIN) cream   Topical BID Redell Datil, APRN - NP   Given at 05/19/22 0934    albuterol (PROVENTIL) nebulizer solution 2.5 mg  2.5 mg Nebulization Q4H PRN Nancy Medin, MD         Ambien [zolpidem tartrate], Aripiprazole, Capoten [captopril], Clioquinol, Clonazepam, Cogentin [benztropine], Depakote [divalproex sodium], Effexor xr [venlafaxine hcl er], Geodon [ziprasidone hcl], Lisinopril, Lyrica [pregabalin], Navane [thiothixene], Pamelor [nortriptyline hcl], Remeron [mirtazapine], Risperdal [risperidone], Seroquel [quetiapine], Trazodone and nefazodone, Wellbutrin [bupropion], Ziprasidone, and Zolpidem  All reviewed and verified by Dr Yasmin Glilespie on today's visit    No results found for: PSA, PSADIA  @LASTWhite [facility] Center[facility]@    Physical Exam  Vitals:    05/18/22 1907 05/18/22 2035 05/19/22 0600 05/19/22 0754   BP: 109/78  (!) 104/44 (!) 115/96   Pulse: 59  50 (!) 47   Resp:  14  16   Temp: 97.3 °F (36.3 °C)   97.2 °F (36.2 °C)   TempSrc:    Axillary   SpO2: 98%   100%   Weight:       Height:         Constitutional: Not in distress speaking with daughter daughter acting as an   Colon catheter draining blood-tinged urine. 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 Patient has failed trial of void  Plan  Recommend Colon decompression  Treatment antibiotics  Patient will eventually need a cystoscopy which can be done as an outpatient or after her infection is treated properly  Urology will follow the patient throughout her stay  Greater 50% of 50 minutes spent evaluating patient face to face. Sergey Crowley MD FACS    Please note this report has been partially produced using speech recognition software  And may cause contain errors related to that system including grammar, punctuation and spelling as well as words and phrases that may seem inappropriate. If there are questions or concerns please feel free to contact me to clarify.

## 2022-05-20 NOTE — FLOWSHEET NOTE
[facility] called and stated that pt will not be a candidate. Pt has no belongings in room. Nursing Supervisor notified.

## 2022-05-20 NOTE — SIGNIFICANT EVENT
CODE BLUE note    Patient lost her pulse and she was in PEA. CPR was immediately initiated. She had multiple rounds of CPR/epi with 3 A of bicarb for 28 minutes. ABG was done and showed pH of 7.17, hypercapnia, lactic acidosis, hyperkalemia and hypoglycemia. 2 amps of D50 and insulin in addition to calcium chloride given. Patient regained pulse and rhythm. Her blood pressure was 052 systolic initially. She maintained her blood pressure for about 10 minutes and then she lost her pulse again. She was in PEA with wide-complex rhythm. CPR restarted. Discussed with the daughter and she was agreeable to stop after 1 last round of CPR. Patient had no pulse after and she was asystole within a minute of stopping CPR.   She was pronounced dead at 6:49 AM.    60-minute of CC time    Electronically signed by Lloyd Thomson MD on 5/20/2022 at 7:02 AM

## 2022-05-20 NOTE — DEATH NOTES
Death Pronouncement Note  Patient's Name: Disha Hankins   Patient's YOB: 1955  MRN Number: [id number]    Admitting Provider: Stephani Hooks MD  Attending Provider: Stephani Hooks MD    Patient was examined and the following were absent: Pulses, Blood Pressure and Respiratory effort    I declared the patient  on 2022 at 6:49 AM    Preliminary Cause of Death: Cardiopulmonary arrest [facility])     Electronically signed by Winston Hassan DO on 22 at 6:57 AM EDT

## 2022-05-20 NOTE — FLOWSHEET NOTE
Assessment completed. VS WNL. A&O to self, place, situation; Anxious, Restless. Mauritian speaking  services used to explain meds, assessment, and ask pt needs. Pt denies further needs after talking to daughter on phone as requested. PM meds given. Insulin coverage not given for BG 96. Pt denies pain. Lab personnel report difficulties drawing 0000 lab since pt is an extremely hard iv stick -will try lab draw again for 0600 BMP. Standard safety precaution in place.

## 2022-05-22 LAB
BLOOD CULTURE, ROUTINE: NORMAL
CULTURE, BLOOD 2: NORMAL

## 2022-05-22 NOTE — DISCHARGE SUMMARY
Discharge Summary    Date: 5/22/2022  Patient Name: Payal Menon YOB: 1955 Age: 61 y.o. Admit Date: 5/18/2022  Discharge Date: 5/20/2022  Discharge Condition:    Admission Diagnosis  Hemorrhagic cystitis (N30.91); Lethargy (R53.83); RADHA (acute kidney injury) ([facility] [address] (N17.9); Altered mental status, unspecified altered mental status type (R41.82)     Discharge Diagnosis  Principal Problem: RADHA (acute kidney injury) ([facility])Active Problems: LethargyResolved Problems: * No resolved hospital problems. [facility]  Narrative of Hospital Course:  Patient comes encephalopathy secondary to UTI, hyponatremia, acute kidney injury on CKD and hyperkalemia and hematuria Patient was eval by renal. Patient was treated for hyperkalemia. Patient had a cardiac arrest and pronounced dead by my partner. Family was updated. Consultants:  IP CONSULT TO UROLOGYIP CONSULT TO NEUROLOGYIP CONSULT TO NEPHROLOGYIP CONSULT TO PALLIATIVE CARE    Surgeries/procedures Performed:       Treatments:           Discharge Plan/Disposition:  Home    Hospital/Incidental Findings Requiring Follow Up:    Patient Instructions:    Diet:    Activity:  For number of days (if applicable): Other Instructions:    Provider Follow-Up:   No follow-ups on file. Significant Diagnostic Studies:    Recent Labs:  Admission on 05/18/2022, Discharged on 05/20/2022Glucose                                      Date: 05/18/2022Value: 80          Ref range: mg/dL              Status: FinalQC OK?                                         Date: 05/18/2022Value: ok            Status: FinalLactic Acid                                   Date: 05/18/2022Value: 1.2         Ref range: 0.5 - 2.2 mmol/L   Status: [address] [facility]                                           Date: 05/18/2022Value: 10.7        Ref range: 4.8 - 10.8 K/uL    Status: [facility]                                           Date: 05/18/2022Value: 3.46*       Ref range: 4.20 - 5.40 M/uL   Status: FinalHemoglobin                                    Date: 05/18/2022Value: 8.3*        Ref range: 12.0 - 16.0 g/dL   Status: FinalHematocrit                                    Date: 05/18/2022Value: 25.6*       Ref range: 37.0 - 47.0 %      Status: FinalMCV                                           Date: 05/18/2022Value: 73.9*       Ref range: 82.0 - 100.0 fL    Status: [address]                                           Date: 05/18/2022Value: 24.0*       Ref range: 27.0 - 31.3 pg     Status: [address]                                          Date: 05/18/2022Value: 32.4*       Ref range: 33.0 - 37.0 %      Status: FinalRDW                                           Date: 05/18/2022Value: 19.4*       Ref range: 11.5 - 14.5 %      Status: FinalPlatelets                                     Date: 05/18/2022Value: 256         Ref range: 130 - 400 K/uL     Status: FinalNeutrophils %                                 Date: 05/18/2022Value: 84.7        Ref range: %                  Status: FinalLymphocytes %                                 Date: 05/18/2022Value: 7.5         Ref range: %                  Status: FinalMonocytes %                                   Date: 05/18/2022Value: 7.4         Ref range: %                  Status: FinalEosinophils %                                 Date: 05/18/2022Value: 0.2         Ref range: %                  Status: FinalBasophils %                                   Date: 05/18/2022Value: 0.2         Ref range: %                  Status: FinalNeutrophils Absolute                          Date: 05/18/2022Value: 9.0*        Ref range: 1.4 - 6.5 K/uL     Status: FinalLymphocytes Absolute                          Date: 05/18/2022Value: 0.8*        Ref range: 1.0 - 4.8 K/uL     Status: FinalMonocytes Absolute                            Date: 05/18/2022Value: 0.8         Ref range: 0.2 - 0.8 K/uL     Status: FinalEosinophils Absolute                          Date: 05/18/2022Value: 0.0         Ref range: 0.0 - 0.7 K/uL     Status: FinalBasophils Absolute                            Date: 05/18/2022Value: 0.0         Ref range: 0.0 - 0.2 K/uL     Status: FinalSodium                                        Date: 05/18/2022Value: 125*        Ref range: 135 - 144 mEq/L    Status: FinalPotassium                                     Date: 05/18/2022Value: 6.4*        Ref range: 3.4 - 4.9 mEq/L    Status: FinalChloride                                      Date: 05/18/2022Value: 90*         Ref range: 95 - 107 mEq/L     Status: FinalCO2                                           Date: 05/18/2022Value: 29          Ref range: 20 - 31 mEq/L      Status: FinalAnion Gap                                     Date: 05/18/2022Value: 6*          Ref range: 9 - 15 mEq/L       Status: FinalGlucose                                       Date: 05/18/2022Value: 117*        Ref range: 70 - 99 mg/dL      Status: FinalBUN                                           Date: 05/18/2022Value: 35*         Ref range: 8 - 23 mg/dL       Status: FinalCREATININE                                    Date: 05/18/2022Value: 1.91*       Ref range: 0.50 - 0.90 mg/dL  Status: FinalGFR Non-                      Date: 05/18/2022Value: 26.4*       Ref range: >60                Status: Final              Comment: >60 mL/min/1.73m2 EGFR, calc. for ages 25 and older using theMDRD formula (not corrected for weight), is valid for stablerenal function. GFR                           Date: 05/18/2022Value: 32.0*       Ref range: >60                Status: Final              Comment: >60 mL/min/1.73m2 EGFR, calc. for ages 25 and older using theMDRD formula (not corrected for weight), is valid for stablerenal function. Calcium                                       Date: 05/18/2022Value: 8.8         Ref range: 8.5 - 9.9 mg/dL    Status: FinalTotal Protein                                 Date: 05/18/2022Value: 7.0         Ref range: 6.3 - 8.0 g/dL     Status: FinalAlbumin                                       Date: 05/18/2022Value: 3.2*        Ref range: 3.5 - 4.6 g/dL     Status: FinalTotal Bilirubin                               Date: 05/18/2022Value: 0.8*        Ref range: 0.2 - 0.7 mg/dL    Status: FinalAlkaline Phosphatase                          Date: 05/18/2022Value: 147*        Ref range: 40 - 130 U/L       Status: FinalALT                                           Date: 05/18/2022Value: 93*         Ref range: 0 - 33 U/L         Status: FinalAST                                           Date: 05/18/2022Value: 151*        Ref range: 0 - 35 U/L         Status: FinalGlobulin                                      Date: 05/18/2022Value: 3.8*        Ref range: 2.3 - 3.5 g/dL     Status: FinalMagnesium                                     Date: 05/18/2022Value: 2.0         Ref range: 1.7 - 2.4 mg/dL    Status: FinalSodium                                        Date: 05/18/2022Value: 126*        Ref range: 135 - 144 mEq/L    Status: FinalPotassium                                     Date: 05/18/2022Value: 6.1*        Ref range: 3.4 - 4.9 mEq/L    Status: FinalChloride                                      Date: 05/18/2022Value: 92*         Ref range: 95 - 107 mEq/L     Status: FinalCO2                                           Date: 05/18/2022Value: 24          Ref range: 20 - 31 mEq/L      Status: FinalAnion Gap                                     Date: 05/18/2022Value: 10          Ref range: 9 - 15 mEq/L       Status: FinalGlucose                                       Date: 05/18/2022Value: 97          Ref range: 70 - 99 mg/dL      Status: FinalBUN                                           Date: 05/18/2022Value: 35*         Ref range: 8 - 23 mg/dL       Status: FinalCREATININE                                    Date: 05/18/2022Value: 2.11*       Ref range: 0.50 - 0.90 mg/dL  Status: FinalGFR Non-                      Date: 05/18/2022Value: 23.5*       Ref range: >60                Status: Final              Comment: >60 mL/min/1.73m2 EGFR, calc. for ages 25 and older using theMDRD formula (not corrected for weight), is valid for stablerenal function. GFR                           Date: 05/18/2022Value: 28.5*       Ref range: >60                Status: Final              Comment: >60 mL/min/1.73m2 EGFR, calc. for ages 25 and older using theMDRD formula (not corrected for weight), is valid for stablerenal function. Calcium                                       Date: 05/18/2022Value: 8.2*        Ref range: 8.5 - 9.9 mg/dL    Status: FinalColor, UA                                     Date: 05/18/2022Value: ORANGE*     Ref range: Straw/Yellow       Status: FinalClarity, UA                                   Date: 05/18/2022Value: TURBID*     Ref range: Clear              Status: FinalGlucose, Ur                                   Date: 05/18/2022Value: Negative    Ref range: Negative mg/dL     Status: FinalBilirubin Urine                               Date: 05/18/2022Value: MODERATE*   Ref range: Negative           Status: FinalKetones, Urine                                Date: 05/18/2022Value: Negative    Ref range: Negative mg/dL     Status: FinalSpecific Gravity, UA                          Date: 05/18/2022Value: 1.045       Ref range: 1.005 - 1.030      Status: FinalBlood, Urine                                  Date: 05/18/2022Value: MODERATE*   Ref range: Negative           Status: FinalpH, UA                                        Date: 05/18/2022Value: 6.0         Ref range: 5.0 - 9.0          Status: FinalProtein, UA                                   Date: 05/18/2022Value: 100*        Ref range: Negative mg/dL     Status: FinalUrobilinogen, Urine                           Date: 05/18/2022Value: 1.0         Ref range: <2.0 E.U./dL       Status: FinalNitrite, Urine                                Date: 05/18/2022Value: POSITIVE*   Ref range: Negative           Status: FinalLeukocyte Esterase, Urine                     Date: 05/18/2022Value: MODERATE*   Ref range: Negative           Status: FinalUrine Reflex to Culture                       Date: 05/18/2022Value: Yes           Status: [address] [facility], UA                                       Date: 05/18/2022Value: >100*       Ref range: 0 - 5 /HPF         Status: FinalRBC, UA                                       Date: 05/18/2022Value: >100*       Ref range: 0 - 5 /HPF         Status: FinalEpithelial Cells, UA                          Date: 05/18/2022Value: 0-2         Ref range: 0 - 5 /HPF         Status: [address] [facility]                                           Date: 05/19/2022Value: 8.5         Ref range: 4.8 - 10.8 K/uL    Status: FinalRBC                                           Date: 05/19/2022Value: 3.64*       Ref range: 4.20 - 5.40 M/uL   Status: FinalHemoglobin                                    Date: 05/19/2022Value: 8.9*        Ref range: 12.0 - 16.0 g/dL   Status: FinalHematocrit                                    Date: 05/19/2022Value: 26.8*       Ref range: 37.0 - 47.0 %      Status: FinalMCV                                           Date: 05/19/2022Value: 73.8*       Ref range: 82.0 - 100.0 fL    [facility]                                           Date: 05/19/2022Value: 24.4*       Ref range: 27.0 - 31.3 pg     Status: [address]                                          Date: 05/19/2022Value: 33.1        Ref range: 33.0 - 37.0 %      Status: FinalRDW                                           Date: 05/19/2022Value: 19.6*       Ref range: 11.5 - 14.5 %      Status: FinalPlatelets                                     Date: 05/19/2022Value: 279         Ref range: 130 - 400 K/uL     Status: FinalPLATELET SLIDE REVIEW                         Date: 05/19/2022Value: Normal        Status: FinalNeutrophils %                                 Date: 05/19/2022Value: 88.0        Ref range: %                  Status: FinalLymphocytes %                                 Date: 05/19/2022Value: 9.0         Ref range: %                  Status: FinalMonocytes %                                   Date: 05/19/2022Value: 2.9         Ref range: %                  Status: FinalEosinophils %                                 Date: 05/19/2022Value: 0.8         Ref range: %                  Status: FinalBasophils %                                   Date: 05/19/2022Value: 1.0         Ref range: %                  Status: FinalNeutrophils Absolute                          Date: 05/19/2022Value: 7.5*        Ref range: 1.4 - 6.5 K/uL     Status: FinalLymphocytes Absolute                          Date: 05/19/2022Value: 0.8*        Ref range: 1.0 - 4.8 K/uL     Status: FinalMonocytes Absolute                            Date: 05/19/2022Value: 0.3         Ref range: 0.2 - 0.8 K/uL     Status: FinalEosinophils Absolute                          Date: 05/19/2022Value: 0.0         Ref range: 0.0 - 0.7 K/uL     Status: FinalBasophils Absolute                            Date: 05/19/2022Value: 0.1         Ref range: 0.0 - 0.2 K/uL     Status: FinalnRBC                                          Date: 05/19/2022Value: 2           Ref range: /100 WBC           Status: FinalAnisocytosis                                  Date: 05/19/2022Value: 2+            Status: FinalMacrocytes                                    Date: 05/19/2022Value: 1+            Status: FinalPolychromasia                                 Date: 05/19/2022Value: 1+            Status: FinalHypochromia                                   Date: 05/19/2022Value: 2+            Status: FinalPoikilocytes                                  Date: 05/19/2022Value: 1+            Status: FinalSchistocytes                                  Date: 05/19/2022Value: 1+            Status: FinalTarget Cells                                  Date: 05/19/2022Value: 2+            Status: FinalPotassium                                     Date: 05/18/2022Value: 6.3*        Ref range: 3.4 - 4.9 mEq/L    Status: FinalPOC Glucose                                   Date: 05/18/2022Value: 153*        Ref range: 70 - 99 mg/dl      Status: FinalPerformed on                                  Date: 05/18/2022Value: ACCU-CHEK     Status: FinalUrine Culture, Routine                        Date: 05/18/2022Value:               Status: Final                 Value:Cult,Urine:  [place] at [address] [place] [address] [phone] Sodium                                    Date: 05/18/2022Value: 132*        Ref range: 136 - 145 mEq/L    Status: FinalPOC Potassium                                 Date: 05/18/2022Value: 5.9*        Ref range: 3.5 - 5.1 mEq/L    Status: FinalPOC Chloride                                  Date: 05/18/2022Value: 99          Ref range: 99 - 110 mEq/L     Status: FinalPOC Glucose                                   Date: 05/18/2022Value: 123*        Ref range: 70 - 99 mg/dl      Status: FinalPOC Creatinine                                Date: 05/18/2022Value: 2.5*        Ref range: 0.6 - 1.2 mg/dL    Status: FinalGFR Non-                      Date: 05/18/2022Value: 19*         Ref range: >60                Status: Final              Comment: >60 mL/min/1.73m2 EGFR, calc. for ages 25 and older using theMDRD formula (not corrected for weight), is valid for stablerenal function. GFR                           Date: 05/18/2022Value: 23*         Ref range: >60                Status: Final              Comment: >60 mL/min/1.73m2 EGFR, calc. for ages 25 and older using theMDRD formula (not corrected for weight), is valid for stablerenal function. Calcium, Ion                                  Date: 05/18/2022Value: 0.89*       Ref range: 1.12 - 1.32 mmol*  Status: FinalpH, Arterial                                  Date: 05/18/2022Value: 7.309*      Ref range: 7.350 - 7.450      Status: FinalpCO2, Arterial                                Date: 05/18/2022Value: 56*         Ref range: 35 - 45 mm Hg      Status: FinalpO2, Arterial                                 Date: 05/18/2022Value: 180*        Ref range: 75 - 108 mm Hg     Status: FinalHCO3, Arterial                                Date: 05/18/2022Value: 28.0        Ref range: 21.0 - 29.0 mmol*  Status: FinalBase Excess, Arterial                         Date: 05/18/2022Value: 2           Ref range: -3 - 3             Status: FinalO2 Sat, Arterial                              Date: 05/18/2022Value: 99*         Ref range: 93 - 100 %         Status: FinalTCO2, Arterial                                Date: 05/18/2022Value: 30          Ref range: 21 - 32 mmol/L     Status: FinalLactate                                       Date: 05/18/2022Value: 1.40        Ref range: 0.40 - 2.00 mmol*  Status: FinalPOC Hematocrit                                Date: 05/18/2022Value: 27*         Ref range: 36 - 48 %          Status: FinalHemoglobin                                    Date: 05/18/2022Value: 9.1*        Ref range: 12.0 - 16.0 gm/dL  Status: FinalFIO2                                          Date: 05/18/2022Value: 5.000         Status: FinalSample Type                                   Date: 05/18/2022Value: ART           Status: FinalPerformed on                                  Date: 05/18/2022Value: SEE BELOW     Status: Final              Comment: Performed on POCSample Type: ArterialDraw site: L Saleem's test: PositiveOxygen Delivery System: CannulaProcalcitonin                                 Date: 05/18/2022Value: 1.82*       Ref range: 0.00 - 0.15 ng/mL  Status: Final              Comment: Suspected Sepsis:Low likelihood of sepsis  <.50 ng/mLIncreased likelihood of sepsis 0.50-2.00 ng/mLAntibiotics encouragedHigh risk of sepsis/shock   >2.00 ng/mLAntibiotics strongly encouragedSuspected Lower Respiratory Tract Infections:Low likelihood of bacterial infection  <0.24 ng/mLIncreased likelihood of bacterial infection >0.24 ng/mLAntibiotics encouragedWith successful antibiotic therapy, PCT levels should decreaserapidly. (Half-life of 24 to 36 hours. )Procalcitonin values from samples collected within the first6 hours of systemic infection may still be low. Retesting may be indicated. Values from day 1 and day 4 can be entered into the Change inProcalcitonin Calculator to determine the patient'sMortality Risk http://www.malik.info/. com)In healthy neonates, plasma Procalcitonin (PCT) concentrationsincrease gradually after birth, reaching peak values at about24 hours of age then decrease to normal values below 0.5                        ng/mLby 48-72 hours of age. Sodium                                        Date: 05/19/2022Value: 128*        Ref range: 135 - 144 mEq/L    Status: FinalPotassium                                     Date: 05/19/2022Value: 6.0*        Ref range: 3.4 - 4.9 mEq/L    Status: FinalChloride                                      Date: 05/19/2022Value: 93*         Ref range: 95 - 107 mEq/L     Status: FinalCO2                                           Date: 05/19/2022Value: 23          Ref range: 20 - 31 mEq/L      Status: FinalAnion Gap                                     Date: 05/19/2022Value: 12          Ref range: 9 - 15 mEq/L       Status: FinalGlucose                                       Date: 05/19/2022Value: 158*        Ref range: 70 - 99 mg/dL      Status: FinalBUN                                           Date: 05/19/2022Value: 36*         Ref range: 8 - 23 mg/dL       Status: FinalCREATININE                                    Date: 05/19/2022Value: 2.45*       Ref range: 0.50 - 0.90 mg/dL  Status: FinalGFR Non-                      Date: 05/19/2022Value: 19.8*       Ref range: >60                Status: Final              Comment: >60 mL/min/1.73m2 EGFR, calc. for ages 25 and older using theMDRD formula (not corrected for weight), is valid for stablerenal function. GFR                           Date: 05/19/2022Value: 24.0*       Ref range: >60                Status: Final              Comment: >60 mL/min/1.73m2 EGFR, calc. for ages 25 and older using theMDRD formula (not corrected for weight), is valid for stablerenal function. Calcium                                       Date: 05/19/2022Value: 9.0         Ref range: 8.5 - 9.9 mg/dL    Status: FinalPOC Glucose                                   Date: 05/19/2022Value: 201*        Ref range: 70 - 99 mg/dl      Status: FinalPerformed on                                  Date: 05/19/2022Value: ACCU-CHEK     Status: FinalPOC Glucose                                   Date: 05/19/2022Value: 178*        Ref range: 70 - 99 mg/dl      Status: FinalPerformed on                                  Date: 05/19/2022Value: ACCU-CHEK     Status: FinalRejected Test                                 Date: 05/19/2022Value: cmpx          Status: FinalReason for Rejection                          Date: 05/19/2022Value: see below     Status: Final              Comment: Unable to perform testing; specimen grossly hemolyzed. To perform testing the specimen will need to be recollected.  HemolyzSodium                                        Date: 05/19/2022Value: 126*        Ref range: 135 - 144 mEq/L    Status: FinalPotassium reflex Magnesium                    Date: 05/19/2022Value: 6.2*        Ref range: 3.4 - 4.9 mEq/L    Status: FinalChloride                                      Date: 05/19/2022Value: 92*         Ref range: 95 - 107 mEq/L     Status: FinalCO2                                           Date: 05/19/2022Value: 27          Ref range: 20 - 31 mEq/L      Status: FinalAnion Gap                                     Date: 05/19/2022Value: 7*          Ref range: 9 - 15 mEq/L       Status: FinalGlucose Date: 05/19/2022Value: 133*        Ref range: 70 - 99 mg/dL      Status: FinalBUN                                           Date: 05/19/2022Value: 40*         Ref range: 8 - 23 mg/dL       Status: FinalCREATININE                                    Date: 05/19/2022Value: 2.53*       Ref range: 0.50 - 0.90 mg/dL  Status: FinalGFR Non-                      Date: 05/19/2022Value: 19.1*       Ref range: >60                Status: Final              Comment: >60 mL/min/1.73m2 EGFR, calc. for ages 25 and older using theMDRD formula (not corrected for weight), is valid for stablerenal function. GFR                           Date: 05/19/2022Value: 23.1*       Ref range: >60                Status: Final              Comment: >60 mL/min/1.73m2 EGFR, calc. for ages 25 and older using theMDRD formula (not corrected for weight), is valid for stablerenal function. Calcium                                       Date: 05/19/2022Value: 8.9         Ref range: 8.5 - 9.9 mg/dL    Status: FinalTotal Protein                                 Date: 05/19/2022Value: 6.5         Ref range: 6.3 - 8.0 g/dL     Status: FinalAlbumin                                       Date: 05/19/2022Value: 2.8*        Ref range: 3.5 - 4.6 g/dL     Status: FinalTotal Bilirubin                               Date: 05/19/2022Value: 0.6         Ref range: 0.2 - 0.7 mg/dL    Status: FinalAlkaline Phosphatase                          Date: 05/19/2022Value: 145*        Ref range: 40 - 130 U/L       Status: FinalALT                                           Date: 05/19/2022Value: 95*         Ref range: 0 - 33 U/L         Status: FinalAST                                           Date: 05/19/2022Value: 128*        Ref range: 0 - 35 U/L         Status: FinalGlobulin                                      Date: 05/19/2022Value: 3.7*        Ref range: 2.3 - 3.5 g/dL     Status: FinalPOC Glucose Date: 05/19/2022Value: 150*        Ref range: 70 - 99 mg/dl      Status: FinalPerformed on                                  Date: 05/19/2022Value: ACCU-CHEK     Status: FinalPOC Glucose                                   Date: 05/19/2022Value: 106*        Ref range: 70 - 99 mg/dl      Status: FinalPerformed on                                  Date: 05/19/2022Value: ACCU-CHEK     Status: FinalSodium                                        Date: 05/19/2022Value: 127*        Ref range: 135 - 144 mEq/L    Status: FinalPotassium                                     Date: 05/19/2022Value: 6.1*        Ref range: 3.4 - 4.9 mEq/L    Status: FinalChloride                                      Date: 05/19/2022Value: 91*         Ref range: 95 - 107 mEq/L     Status: FinalCO2                                           Date: 05/19/2022Value: 25          Ref range: 20 - 31 mEq/L      Status: FinalAnion Gap                                     Date: 05/19/2022Value: 11          Ref range: 9 - 15 mEq/L       Status: FinalGlucose                                       Date: 05/19/2022Value: 80          Ref range: 70 - 99 mg/dL      Status: FinalBUN                                           Date: 05/19/2022Value: 44*         Ref range: 8 - 23 mg/dL       Status: FinalCREATININE                                    Date: 05/19/2022Value: 2.99*       Ref range: 0.50 - 0.90 mg/dL  Status: FinalGFR Non-                      Date: 05/19/2022Value: 15.7*       Ref range: >60                Status: Final              Comment: >60 mL/min/1.73m2 EGFR, calc. for ages 25 and older using theMDRD formula (not corrected for weight), is valid for stablerenal function. GFR                           Date: 05/19/2022Value: 19.1*       Ref range: >60                Status: Final              Comment: >60 mL/min/1.73m2 EGFR, calc. for ages 25 and older using theMDRD formula (not corrected for weight), is valid for stablerenal function. Calcium                                       Date: 05/19/2022Value: 8.7         Ref range: 8.5 - 9.9 mg/dL    Status: FinalPOC Glucose                                   Date: 05/19/2022Value: 99          Ref range: 70 - 99 mg/dl      Status: FinalPerformed on                                  Date: 05/19/2022Value: ACCU-CHEK     Status: FinalPOC Glucose                                   Date: 05/19/2022Value: 96          Ref range: 70 - 99 mg/dl      Status: FinalPerformed on                                  Date: 05/19/2022Value: ACCU-CHEK     Status: FinalPOC Sodium                                    Date: 05/20/2022Value: 136         Ref range: 136 - 145 mEq/L    Status: FinalPOC Potassium                                 Date: 05/20/2022Value: 7.2*        Ref range: 3.5 - 5.1 mEq/L    Status: FinalPOC Chloride                                  Date: 05/20/2022Value: 94*         Ref range: 99 - 110 mEq/L     Status: FinalPOC Glucose                                   Date: 05/20/2022Value: <20*        Ref range: 70 - 99 mg/dl      Status: FinalPOC Creatinine                                Date: 05/20/2022Value: 4.1*        Ref range: 0.6 - 1.2 mg/dL    Status: FinalGFR Non-                      Date: 05/20/2022Value: 11*         Ref range: >60                Status: Final              Comment: >60 mL/min/1.73m2 EGFR, calc. for ages 25 and older using theMDRD formula (not corrected for weight), is valid for stablerenal function. GFR                           Date: 05/20/2022Value: 13*         Ref range: >60                Status: Final              Comment: >60 mL/min/1.73m2 EGFR, calc. for ages 25 and older using theMDRD formula (not corrected for weight), is valid for stablerenal function. Calcium, Ion                                  Date: 05/20/2022Value: 1.11*       Ref range: 1.12 - 1.32 mmol*  Status: FinalpH, Arterial                                  Date: 05/20/2022Value: 7.167*      Ref range: 7.350 - 7.450      Status: FinalpCO2, Arterial                                Date: 05/20/2022Value: 83*         Ref range: 35 - 45 mm Hg      Status: FinalpO2, Arterial                                 Date: 05/20/2022Value: 65*         Ref range: 75 - 108 mm Hg     Status: FinalHCO3, Arterial                                Date: 05/20/2022Value: 30.1*       Ref range: 21.0 - 29.0 mmol*  Status: FinalBase Excess, Arterial                         Date: 05/20/2022Value: 2           Ref range: -3 - 3             Status: FinalO2 Sat, Arterial                              Date: 05/20/2022Value: 84*         Ref range: 93 - 100 %         Status: FinalTCO2, Arterial                                Date: 05/20/2022Value: 33*         Ref range: 21 - 32 mmol/L     Status: FinalLactate                                       Date: 05/20/2022Value: 9.26*       Ref range: 0.40 - 2.00 mmol*  Status: FinalPOC Hematocrit                                Date: 05/20/2022Value: 30*         Ref range: 36 - 48 %          Status: FinalHemoglobin                                    Date: 05/20/2022Value: 10.1*       Ref range: 12.0 - 16.0 gm/dL  Status: FinalFIO2                                          Date: 05/20/2022Value: 100.000       Status: FinalSample Type                                   Date: 05/20/2022Value: ART           Status: FinalPerformed on                                  Date: 05/20/2022Value: SEE BELOW     Status: Final              Comment: Performed on POCSample Type: ArterialAllen's test: N/AOxygen Delivery System: BaggingCritical action: Notify physicianCritical notify: dr cyrRead back: YesNotify date: 20-May-22Notify time: 06:25:18POC Glucose                                   Date: 05/20/2022Value: 86          Ref range: 70 - 99 mg/dl      Status: FinalPerformed on                                  Date: 05/20/2022Value: ACCU-CHEK     Status: Final------------    Radiology last 7 days:  CT Head WO ContrastResult Date: 5/18/2022No acute intracranial process. CT ABDOMEN PELVIS W IV CONTRAST Additional Contrast? NoneResult Date: 5/17/20221. SMALL AMOUNT OF FREE FLUID IN THE UPPER ABDOMEN. 2. MARKED AMOUNT OF SOFT TISSUE INDURATION IN THE ENTIRE ABDOMINAL WALL. THIS IS MORE PRONOUNCED ON THE LEFT AND PERHAPS A LITTLE MORE EXTENSIVE THAN ON THE PREVIOUS EXAM. AN INFLAMMATORY PROCESS OR ABSCESS CANNOT BE EXCLUDED. CT KIDNEY WO CONTRASTResult Date: 5/18/2022Small volume ascites. Nonobstructing bilateral renal calculi. Delayed nephrograms from contrast enhanced study yesterday suggestive of decreased renal function. Correlate with renal function tests. Bladder decompressed with Colon catheter with air in the bladder likely secondary to placement. There is concern for cystitis, please correlate with urinalysis. Nonspecific patchy opacity within the lung bases may be infectious/inflammatory or could relate aspiration. Trace bilateral pleural effusions. Diffuse soft tissue edema/anasarca with focal areas of fluid within the left abdominal wall musculature, unchanged. Again differential includes hematoma/stroma, and abscess ========== XR CHEST ABDOMEN NG PLACEMENTResult Date: 5/19/2022TIP OF NASOGASTRIC TUBE IN STOMACH. Pending Labs   Order Current Status  POCT EPOC BLOOD GAS, LACTIC ACID, ICA Collected (05/18/22 2035)      Discharge Medications    Discharge Medication List as of 5/20/2022 10:49 AM    Discharge Medication List as of 5/20/2022 10:49 AM    Discharge Medication List as of 5/20/2022 10:49 AMCONTINUE these medications which have NOT CHANGEDoxyCODONE-acetaminophen (PERCOCET) 5-325 MG per tabletTake 1 tablet by mouth every 6 hours as needed for Pain for up to 3 days. Intended supply: 3 days.  Take lowest dose possible to manage pain, Disp-12 tablet, R-0Printondansetron (ZOFRAN-ODT) 4 MG disintegrating tabletTake 1 tablet by mouth 3 times daily as needed for Nausea or Vomiting, Disp-21 tablet, R-0Printciprofloxacin (CIPRO) 500 MG tabletTake 1 tablet by mouth 2 times daily for 10 days, Disp-20 tablet, R-0Print!! iron polysaccaride (FERREX 150 FORTE PLUS)  MG CAPSTake 1 capsule by mouth dailyHistorical Med!! furosemide (LASIX) 20 MG tabletTake 20 mg by mouth every morning (before breakfast)Historical Med!! furosemide (LASIX) 40 MG tabletTake 40 mg by mouth every morningHistorical Medcarvedilol (COREG) 6.25 MG tabletTake 6.25 mg by mouth 2 times daily (with meals) Indications: High Blood Pressure DisorderHistorical MedhydrALAZINE (APRESOLINE) 50 MG tabletTake 1 tablet by mouth every 8 hours Hold if SBP < 120, Disp-90 tablet, R-3Normalpotassium chloride (KLOR-CON M) 20 MEQ extended release tabletTake 1 tablet by mouth daily, Disp-60 tablet, R-3Normaldicyclomine (BENTYL) 10 MG capsuleTake 1 capsule by mouth every 6 hours as needed (cramps), Disp-20 capsule, R-0Printalbuterol sulfate HFA (VENTOLIN HFA) 108 (90 Base) MCG/ACT inhalerInhale 2 puffs into the lungs as needed for Wheezing Every 4-6 hours PRN, Disp-1 each, R-3Normalalbuterol (PROVENTIL) (2.5 MG/3ML) 0.083% nebulizer solutionTake 3 mLs by nebulization every 6 hours as needed for Wheezing, Disp-120 each, R-3Normalmontelukast (SINGULAIR) 10 MG tabletTake 1 tablet by mouth nightly, Disp-30 tablet, R-3Normalinsulin glargine (LANTUS SOLOSTAR) 100 UNIT/ML injection pen60 units at bedtime, Disp-15 pen, R-3Normalinsulin lispro, 1 Unit Dial, (HUMALOG KWIKPEN) 100 UNIT/ML SOPN15  units at each meals, Disp-10 pen, [id number] (TRULICITY) 6.82 YK/3.2BS SOPNInject 0.75 mg into the skin once a week, Disp-4 pen, R-3Normallevothyroxine (SYNTHROID) 50 MCG tablettake 1 tablet by mouth once daily, Disp-30 tablet, R-5Normaloxybutynin (DITROPAN-XL) 5 MG extended release tablettake 1 tablet by mouth once daily, Disp-90 tablet, R-3NormalbusPIRone (BUSPAR) 10 MG tabletTake 10 mg by mouth in the morning and at bedtime Indications: Schizophrenia Historical Medprazosin (MINIPRESS) 2 MG capsuleTake 2 mg by mouth nightly Indications: High Blood Pressure Disorder Historical Medammonium lactate (LAC-HYDRIN) 12 % lotionApply topically daily as needed for Dry Skin , Topical, DAILY PRN Starting Wed 6/23/2021, Historical Medatorvastatin (LIPITOR) 80 MG tabletTake 80 mg by mouth nightly Indications: High Amount of Fats in the Blood Historical Meddiclofenac sodium (VOLTAREN) 1 % GELApply 2 g topically Indications: Pain , Topical, Historical Meddoxepin (SINEQUAN) 25 MG capsuleTake 25 mg by mouth nightly Indications: Depression Historical Medranolazine (RANEXA) 1000 MG extended release tabletTake 1 tablet by mouth 2 times daily, Disp-60 tablet, R-3Normalgabapentin (NEURONTIN) 600 MG tabletTake 600 mg by mouth 2 times daily.  Indications: Nerve Disease Historical MedRESTASIS 0.05 % ophthalmic emulsionPlace 1 drop into both eyes nightly Indications: Dry Eyes , DAWHistorical Medneomycin-polymyxin-dexameth (MAXITROL) 3.5-52142-7.1 ophthalmic suspensionPlace 1 drop into both eyes 4 times daily Indications: Glaucoma Historical MedARTIFICIAL TEARS 1.4 % ophthalmic solutionPlace 1 drop into both eyes in the morning and at bedtime , DAWHistorical Meddocusate sodium (COLACE, DULCOLAX) 100 MG CAPSTake 100 mg by mouth 2 times daily, Disp-60 capsule,R-1Normalsertraline (ZOLOFT) 100 MG tabletTake 100 mg by mouth daily Indications: Depression Historical Medticagrelor (BRILINTA) 90 MG TABS tabletTake 90 mg by mouth 2 times daily Indications: Diabetes Historical MedCPAP Machine MISCStarting Thu 8/20/2020, Disp-1 each,R-0, PrintNew CPAP with 10 cmaspirin 81 MG EC tabletTake 1 tablet by mouth daily, Disp-30 tablet, [id number] lit O2 with sleep , please give O2 concentrator, Disp-1 Units, R-0PrintEmollient (EUCERIN INTENSIVE REPAIR HAND) 2.5-10 % CREAAPPLY TO FEET AT BEDTIME; PLACE SOCKS OVER FEET AFTER APPLICATION IF NEEDED FOR DRY CRACKING FEETHistorical MedhydrOXYzine (VISTARIL) 25 MG capsuleTake 50 mg by mouth 3 times daily as needed for Anxiety Historical MedNutritional Supplements ([address]) LIQDtake as directed three times a dayHistorical Medisosorbide dinitrate (ISORDIL) 20 MG tabletTake 1 tablet by mouth 3 times daily, Disp-90 tablet, R-3NormalnitroGLYCERIN (NITROSTAT) 0.4 MG SL tabletup to max of 3 total doses. If no relief after 1 dose, call 911., Disp-25 tablet, R-3Normalhaloperidol (HALDOL) 5 MG tabletTake 5 mg by mouth nightly Indications: Schizophrenia Historical Medfolic acid (FOLVITE) 1 MG tabletTake 1 mg by mouth daily Indications: Anemia Historical Med!! Iron Polysacch [id number]-B71-BN (NIFEREX-150 FORTE PO)Take 1 tablet by mouth daily Historical MedCyanocobalamin (VITAMIN B-12) 1000 MCG extended release tabletTake 1,000 mcg by mouth daily Indications: Anemia Historical Medmeclizine (ANTIVERT) 25 MG tabletTake 25 mg by mouth 2 times daily Indications: Sensation of Spinning or Whirling Historical Med!! - Potential duplicate medications found. Please discuss with provider. Discharge Medication List as of 5/20/2022 10:49 AM    Time Spent on Discharge:E] minutes were spent in patient examination, evaluation, counseling as well as medication reconciliation, prescriptions for required medications, discharge plan, and follow up.     Electronically signed by Kristal Briggs MD on 5/22/22 at 12:01 PM EDT   overtime on dc summary was 45 min

## 2022-05-23 NOTE — PROGRESS NOTES
Physician Progress Note      PATIENT:               Emily Calvillo  [facility] #:                  [id number]  :                       1955  ADMIT DATE:       2022 12:10 PM  [address] DATE:        2022 10:45 AM  RESPONDING  PROVIDER #:        Tessa Powell MD          QUERY TEXT:    Patient admitted with RADHA on CKD 3, hyperkalemia, hyponatremia, UTI, acidosis,   encephalopathy.  Renal consult notes RADHA 2/2 hypotension and contrast   induced nephropathy. SCr 1.91>2.99, baseline 1-1.2. Pt developed PEA arrest   and  on . If possible, please document in progress notes and   discharge summary if you are evaluating and/or treating any of the following: The medical record reflects the following:  Risk Factors: CT scan with contrast, hypotension, UTI, CKD 3 at baseline, h/o   recurrent RADHA, HTN DM, CAD  Clinical Indicators: Scr 1.91>2.11>2.45>2.53>2.99; ABG pH 7.309>7.167 pCO2   56>83 HCO3 28>30.128; Na 129-125; K 5-6.3; BUN 39; Lactate 9.26; Procalcitonin   1.82  Treatment: NS 2 L bolus, Lasix IV 80 mg x1, BMP, monitoring renal function,   renal consult    Defined by Kidney Disease Improving Global Outcomes (KDIGO) clinical practice   guideline for acute kidney injury:  -Increase in SCr by greater than or equal to 0.3 mg/dl within 48 hours; or  -Increase or decrease in SCr to greater than or equal to 1.5 times baseline,   which is known or presumed to have occurred within the prior 7 days; or  -Urine volume < 0.5ml/kg/h for 6 hours    Thank you, Chrissy Lechuga RN BSN [facility]  [phone]  Options provided:  -- Acute kidney failure with probable acute tubular necrosis  -- Other - I will add my own diagnosis  -- Disagree - Not applicable / Not valid  -- Disagree - Clinically unable to determine / Unknown  -- Refer to Clinical Documentation Reviewer    PROVIDER RESPONSE TEXT:    This patient is in Acute kidney failure with probable acute tubular necrosis.     Query created by: Wendall Koyanagi on 5/20/2022 2:53 PM      Electronically signed by:  Brett Wayne MD 5/23/2022 1:01 PM

## 2022-05-26 LAB
EKG ATRIAL RATE: 50 BPM
EKG Q-T INTERVAL: 480 MS
EKG QRS DURATION: 142 MS
EKG QTC CALCULATION (BAZETT): 455 MS
EKG R AXIS: 144 DEGREES
EKG T AXIS: -62 DEGREES
EKG VENTRICULAR RATE: 54 BPM

## 2022-06-09 NOTE — PROGRESS NOTES
Patient Name: Timo Mobely  YOB: 1955  Medical Record Number: [id number]    History of Present Illness: This 81-year-old woman was admitted here after his hospitalization. Patient had abdominal pain recent cholecystitis patient did undergo laparoscopic ostectomy patient had evidence of septic shock and bacteremia patient has been functionally stable patient did undergo course of therapy patient had a visual support patient was stabilized was seen by cardiology nephrology patient functional decline he was stabilized and simply transferred here for ongoing course of care patient is here on palliative/hospice service. Functionally declining. Pain-free at this time. But globally weak    Review of Systems   Constitutional: Positive for fatigue. HENT: Negative for congestion. Respiratory: Positive for shortness of breath. Negative for stridor. Cardiovascular: Positive for leg swelling. Negative for chest pain. Gastrointestinal: Positive for abdominal pain. Negative for constipation. Genitourinary: Positive for dysuria. Musculoskeletal: Positive for back pain and myalgias. Neurological: Positive for weakness. Psychiatric/Behavioral: Positive for confusion. All other systems reviewed and are negative.       Review of Systems: All 14 review of systems negative other than as stated above    Social History     Tobacco Use    Smoking status: Never Smoker    Smokeless tobacco: Never Used   Vaping Use    Vaping Use: Never used   Substance Use Topics    Alcohol use: Never    Drug use: Never         Past Medical History:   Diagnosis Date    Asthma     CAD (coronary artery disease)     CKD (chronic kidney disease) stage 3, GFR 30-59 ml/min ([facility])     Colitis     CVA [facility]cerebral vascular accident) [facility]     right frontal lobe may 2020    Diabetes mellitus ([facility]     Hyperlipidemia     Hypertension     PAD (peripheral artery disease) ([facility])     Prolonged emergence from general anesthesia     PVD (peripheral vascular disease) ([facility]     Thyroid disease            Past Surgical History:   Procedure Laterality Date    CARDIAC SURGERY      CATARACT EXTRACTION W/  INTRAOCULAR LENS IMPLANT Bilateral 11- 11-     SECTION      CHOLECYSTECTOMY, LAPAROSCOPIC N/A 2022    LAPAROSCOPIC CHOLECYSTECTOMY performed by Juancarlos Johnston MD at [address] [facility] 2019    COLONOSCOPY DIAGNOSTIC performed by Elisha Whipple MD at [address] [facility]  2019    unknown vessels    HYSTERECTOMY      TOE AMPUTATION Right     3rd toe         Current Outpatient Medications on File Prior to Visit   Medication Sig Dispense Refill    carvedilol (COREG) 6.25 MG tablet Take 6.25 mg by mouth 2 times daily (with meals) Indications: High Blood Pressure Disorder      hydrALAZINE (APRESOLINE) 50 MG tablet Take 1 tablet by mouth every 8 hours Hold if SBP < 120 (Patient taking differently: Take 50 mg by mouth every 8 hours Indications: High Blood Pressure Disorder Hold if SBP < 120) 90 tablet 3    potassium chloride (KLOR-CON M) 20 MEQ extended release tablet Take 1 tablet by mouth daily (Patient taking differently: Take 20 mEq by mouth daily Indications: Nutritional Support ) 60 tablet 3    dicyclomine (BENTYL) 10 MG capsule Take 1 capsule by mouth every 6 hours as needed (cramps) (Patient taking differently: Take 10 mg by mouth every 6 hours as needed (cramps) Indications: Cramping Pain in the Abdomen ) 20 capsule 0    albuterol sulfate HFA (VENTOLIN HFA) 108 (90 Base) MCG/ACT inhaler Inhale 2 puffs into the lungs as needed for Wheezing Every 4-6 hours PRN (Patient taking differently: Inhale 2 puffs into the lungs as needed for Wheezing Indications: Chronic Obstructive Lung Disease Every 4-6 hours PRN ) 1 each 3    albuterol (PROVENTIL) (2.5 MG/3ML) 0.083% nebulizer solution Take 3 mLs by nebulization every 6 hours as needed for Wheezing 120 each 3    montelukast (SINGULAIR) 10 MG tablet Take 1 tablet by mouth nightly (Patient taking differently: Take 10 mg by mouth nightly Indications: Seasonal Allergy ) 30 tablet 3    insulin glargine (LANTUS SOLOSTAR) 100 UNIT/ML injection pen 60 units at bedtime (Patient taking differently: Inject 60 Units into the skin nightly Indications: Type 2 Diabetes 60 units at bedtime) 15 pen 3    insulin lispro, 1 Unit Dial, (HUMALOG KWIKPEN) 100 UNIT/ML SOPN 15  units at each meals (Patient taking differently: Inject 15 Units into the skin 3 times daily (before meals) Indications: Type 2 Diabetes 15  units at each meals) 10 pen 03    Dulaglutide (TRULICITY) 9.45 NX/0.3AI SOPN Inject 0.75 mg into the skin once a week (Patient taking differently: Inject 0.75 mg into the skin once a week Indications: Type 2 Diabetes ) 4 pen 3    levothyroxine (SYNTHROID) 50 MCG tablet take 1 tablet by mouth once daily (Patient taking differently: Take 50 mcg by mouth Daily Indications: Underactive Thyroid take 1 tablet by mouth once daily) 30 tablet 5    oxybutynin (DITROPAN-XL) 5 MG extended release tablet take 1 tablet by mouth once daily (Patient taking differently: Take 5 mg by mouth daily Indications: Bladder Spasm ) 90 tablet 3    busPIRone (BUSPAR) 10 MG tablet Take 10 mg by mouth in the morning and at bedtime Indications: Schizophrenia       prazosin (MINIPRESS) 2 MG capsule Take 2 mg by mouth nightly Indications: High Blood Pressure Disorder       ammonium lactate (LAC-HYDRIN) 12 % lotion Apply topically daily as needed for Dry Skin       atorvastatin (LIPITOR) 80 MG tablet Take 80 mg by mouth nightly Indications: High Amount of Fats in the Blood       diclofenac sodium (VOLTAREN) 1 % GEL Apply 2 g topically Indications: Pain       doxepin (SINEQUAN) 25 MG capsule Take 25 mg by mouth nightly Indications: Depression       ranolazine (RANEXA) 1000 MG extended release tablet Take 1 tablet by mouth 2 times daily (Patient taking differently: Take 1,000 mg by mouth 2 times daily Indications: Angina Pectoris ) 60 tablet 3    gabapentin (NEURONTIN) 600 MG tablet Take 600 mg by mouth 2 times daily. Indications: Nerve Disease       RESTASIS 0.05 % ophthalmic emulsion Place 1 drop into both eyes nightly Indications: Dry Eyes       neomycin-polymyxin-dexameth (MAXITROL) 3.5-58973-2.1 ophthalmic suspension Place 1 drop into both eyes 4 times daily Indications: Glaucoma  (Patient not taking: Reported on 5/12/2022)      ARTIFICIAL TEARS 1.4 % ophthalmic solution Place 1 drop into both eyes in the morning and at bedtime       docusate sodium (COLACE, DULCOLAX) 100 MG CAPS Take 100 mg by mouth 2 times daily (Patient taking differently: Take 200 mg by mouth nightly Indications: Constipation At bedtime.) 60 capsule 1    sertraline (ZOLOFT) 100 MG tablet Take 100 mg by mouth daily Indications: Depression       ticagrelor (BRILINTA) 90 MG TABS tablet Take 90 mg by mouth 2 times daily Indications: Diabetes       CPAP Machine MISC by Does not apply route New CPAP with 10 cm 1 each 0    aspirin 81 MG EC tablet Take 1 tablet by mouth daily (Patient taking differently: Take 81 mg by mouth daily Indications: Peripheral Vascular Disease ) 30 tablet 3    OXYGEN 2 lit O2 with sleep , please give O2 concentrator 1 Units 0    Emollient (EUCERIN INTENSIVE REPAIR HAND) 2.5-10 % CREA APPLY TO FEET AT BEDTIME; PLACE SOCKS OVER FEET AFTER APPLICATION IF NEEDED FOR DRY CRACKING FEET      hydrOXYzine (VISTARIL) 25 MG capsule Take 50 mg by mouth 3 times daily as needed for Anxiety       Nutritional Supplements (GLUCERNA SHAKE) LIQD take as directed three times a day      isosorbide dinitrate (ISORDIL) 20 MG tablet Take 1 tablet by mouth 3 times daily (Patient taking differently: Take 20 mg by mouth 3 times daily Indications: Schizophrenia ) 90 tablet 3    nitroGLYCERIN (NITROSTAT) 0.4 MG SL tablet up to max of 3 total doses.  If no relief after 1 dose, call 911. (Patient not taking: Reported on 5/12/2022) 25 tablet 3    haloperidol (HALDOL) 5 MG tablet Take 5 mg by mouth nightly Indications: Schizophrenia       folic acid (FOLVITE) 1 MG tablet Take 1 mg by mouth daily Indications: Anemia       Iron Polysacch [id number] (NIFEREX-150 FORTE PO) Take 1 tablet by mouth daily       Cyanocobalamin (VITAMIN B-12) 1000 MCG extended release tablet Take 1,000 mcg by mouth daily Indications: Anemia       meclizine (ANTIVERT) 25 MG tablet Take 25 mg by mouth 2 times daily Indications: Sensation of Spinning or Whirling        No current facility-administered medications on file prior to visit. Allergies   Allergen Reactions    Ambien [Zolpidem Tartrate]     Aripiprazole     Capoten [Captopril]     Clioquinol     Clonazepam      Other reaction(s): Palpitations    Cogentin [Benztropine]     Depakote [Divalproex Sodium]     Effexor Xr [Venlafaxine Hcl Er]     Geodon [Ziprasidone Hcl]     Lisinopril      Hyperkalemia: 4/21/20 potassium was 6.7    Lyrica [Pregabalin]     Navane [Thiothixene]     Pamelor [Nortriptyline Hcl]     Remeron [Mirtazapine]     Risperdal [Risperidone]     Seroquel [Quetiapine]     Trazodone And Nefazodone     Wellbutrin [Bupropion]     Ziprasidone      Other reaction(s): Other, Sleeplessness    Zolpidem      Other reaction(s): Insomnia, Other         No family history on file. Physical Exam:      Physical Exam  Vitals and nursing note reviewed. Constitutional:       Appearance: Normal appearance. She is obese. HENT:      Head: Normocephalic and atraumatic. Nose: Nose normal.      Mouth/Throat:      Mouth: Mucous membranes are moist.   Eyes:      Extraocular Movements: Extraocular movements intact. Cardiovascular:      Rate and Rhythm: Normal rate. Pulses: Normal pulses. Heart sounds: Normal heart sounds. Pulmonary:      Effort: Pulmonary effort is normal.      Breath sounds: No wheezing. Abdominal:      General: Bowel sounds are normal.      Tenderness: There is abdominal tenderness. Musculoskeletal:         General: Normal range of motion. Cervical back: Neck supple. No tenderness. Skin:     General: Skin is warm. Findings: Bruising present. Neurological:      Motor: Weakness present. Psychiatric:         Mood and Affect: Mood normal.         not currently breastfeeding.       .   Lab Results   Component Value Date    WBC 8.5 05/19/2022    HGB 10.1 (L) 05/20/2022    HCT 26.8 (L) 05/19/2022    MCV 73.8 (L) 05/19/2022     05/19/2022     Lab Results   Component Value Date     05/19/2022    K 6.1 05/19/2022    K 6.2 05/19/2022    CL 91 05/19/2022    CO2 25 05/19/2022    BUN 39 05/19/2022    CREATININE 4.1 05/20/2022    CREATININE 2.99 05/19/2022    GLUCOSE 80 05/19/2022    GLUCOSE 279 01/06/2020    CALCIUM 8.7 05/19/2022                ASSESSMENT:  Patient Active Problem List   Diagnosis    Major depressive disorder, recurrent, severe with psychotic symptoms ([address]    Diabetes mellitus ([address]    Hypertension    HLD (hyperlipidemia)    Thyroid disease    Congestive heart failure (HCC)    Non-pressure ulcer of toe (HCC)    Atherosclerotic PVD with intermittent claudication (HCC)    Osteomyelitis ([address]    Infection of skin    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    Surgical wound dehiscence, initial encounter    S/P amputation of lesser toe, right (HCC)    Rectal bleeding    Athscl native arteries of left leg w ulceration oth prt foot ([address]    JESICA (obstructive sleep apnea)    Secondary diabetes mellitus ([address]    Chest pain    Peripheral vascular disease ([address]    Cellulitis    Syncope and collapse    Nonrheumatic mitral (valve) insufficiency    Ischemic myocardial dysfunction    Pulmonary hypertension (HCC)    Acute on chronic combined systolic and diastolic congestive heart failure (HCC)    Asthma    Acute kidney injury superimposed on CKD ([address]  Dizziness    Cerebrovascular disease    Ataxic gait    Anxiety    Gastritis, unspecified, without bleeding    Pure hypercholesterolemia    Schizophrenia (HCC)    Coronary arteriosclerosis    Encephalopathy    Gangrene of toe ([facility]    Drug-induced hypotension    Osteomyelitis of right foot ([facility])    Nausea and vomiting    Mild intermittent asthma without complication    Heart failure, diastolic, with acute decompensation ([facility])    Major depressive disorder, single episode, unspecified    Type 2 diabetes mellitus with diabetic neuropathy, unspecified ([facility])    Obesity (BMI 30-39. 9)    Disorder of carotid artery ([facility])    NSTEMI (non-ST elevated myocardial infarction) ([address]    Pneumonia    CKD (chronic kidney disease) stage 3, GFR 30-59 ml/min ([facility])    Pain in right hand    Anesthesia of skin    Carpal tunnel syndrome    History of angioplasty of peripheral vessel    History of coronary artery bypass surgery    Paresthesia of skin    Acute decompensated heart failure ([facility])    Depression    Abdominal pain    Abdominal pain, right upper quadrant    Shock ([address]    Gram-positive bacteremia    Acute cholecystitis    Myopathy    Polyneuropathy associated with underlying disease ([address]    RADHA (acute kidney injury) ([address]    Lethargy         PLAN:   Diagnosis Orders   1. Abdominal pain, right upper quadrant     2. Weakness     3. Type 2 diabetes mellitus with diabetic neuropathy, with long-term current use of insulin ([address]     4. Myopathy     5. Schizophrenia, unspecified type ([address]     6. JESICA (obstructive sleep apnea)     7. Primary hypertension       Continue with supportive care continue with nutritional support monitoring abdominal discomfort monitor blood sugars. Continue neuroleptic agents. Reorientation as able with monitoring pain at this time follow-up blood work is pending. Goal maximize her functional status and comfort at this time. With surgical service as needed.     Please note orders entered on site at facility after discussion with appropriate facility nursing/therapy/ / nutritional staff. Current longstanding medical problems and acute medical issues addressed with staff. Available data and data elements in on site paper chart reviewed and analyzed. Current external consultant notes reviewed in on site chart. Ordered laboratory testing and imaging will be reviewed when available. This patient's need for psychiatric medication has been reviewed. Will consider further adjustment and possible further evaluations by mental health services.

## 2022-06-10 ASSESSMENT — ENCOUNTER SYMPTOMS
BACK PAIN: 1
STRIDOR: 0
CONSTIPATION: 0
SHORTNESS OF BREATH: 1
ABDOMINAL PAIN: 1

## 2022-06-10 NOTE — ADT AUTH CERT
Oral hydration and diet tolerated    5/12/2020 1:20 PM EDT by Nicola Mcmahon      adequate    (X) * Discharge plans and education understood    5/12/2020 1:20 PM EDT by Monica Potter awaiting precert for rehab and was denied    Activity    (X) * Ambulatory    5/12/2020 1:20 PM EDT by Monica Potter ambulating in room with cane    Routes    (X) * Oral hydration, medications, and diet    5/12/2020 1:23 PM EDT by Nicola Mcmahon      adequate po intake , po meds ni IV's ordered    5/12/2020 1:20 PM EDT by Nicola Mcmahon      po meds only adequate po intake    (X) Usual diet    * Milestone   Additional Notes   Care day 8:   5/12/20            Reason for Admission    R frontal lobe CVA         VS   Blood pressure 125/61, pulse 69, temperature 98.2 °F (36.8 °C), temperature source Oral, resp. rate 18, height 5' 5\" (1.651 m), weight 225 lb 6.4 oz (102.2 kg), SpO2 95 %.  RA            Radiology testing/Labs pertinent      5/12/2020 06:18   POC Glucose: 92      5/12/2020 11:11   POC Glucose: 162 (H)            Meds-name, dose, route,rate   Tylenol 500 mg po x 1   Asa 81 mg po x 1   Lipitor 80 mg po x 1   Lovenox 40 mg sq x 1   Folic acid 1 mg po x 1   Neurontin 400 mg po x 1   Humalog sq cov x 1   Humalog 4 u sq x 2   Synthroid 50 mcg po x 1   Toprol xl 50 mg po x 1   Zoloft 200 mg po x 1   Brilinta 90 mg po x 1   Vit b 12 1000 mcg po x 1            Treatment plan-attending, consults   Medical attending:      Patient is doing well. Portillo Hernandez is doing and performing beyond expectation. [facility] declined to approve her going to the rehab unit.  Patient is able to walk in the room.  Patient denies any chest pain, abdominal pain, nausea, vomiting, diarrhea, dysuria or hematuria.    Plan:   Patient is doing very well beyond the expectation. [facility] declined the disposition to rehab. Portillo Hernandez will be going home with home health care             Electronically signed by Mitzy Kearney RN        Dizziness - Care Day 7 (5/12/2020) by Mitzy Kearney RN         Review Status Review Entered   Completed 5/12/2020 12:53       Criteria Review      Care Day: 7 Care Date: 5/12/2020 Level of Care: Intermediate Care    Guideline Day 2    Level Of Care    (X) Floor or intermediate care to discharge    (X) Complete discharge planning    Clinical Status    (X) * Vertigo or dizziness absent or managed    (X) * Hemodynamic stability    (X) * Dangerous arrhythmia absent    (X) * No evidence of bacterial labyrinthitis    ( ) * No cardiac or neurologic events or findings requiring inpatient care identified    (X) * Oral hydration and diet tolerated    (X) * Discharge plans and education understood    Activity    (X) * Ambulatory    Routes    (X) * Oral hydration, medications, and diet    (X) Usual diet    Medications    (X) Possible antivertigo medication    * Milestone   Additional Notes      Care day 7:  5/11/20            Reason for Admission       R frontal lobe CVA         VS      05/11/20 1406 97.2 (36.2) 18 64 131/106 - Sitting - - 97 None (Room air) -5/11/20 0733 97.9 (36.6) 18 61 131/83 - Sitting - - 100 None (Room air) -             Radiology testing/Labs pertinent      5/11/2020 06:30   POC Glucose: 78      5/11/2020 11:03   POC Glucose: 149 (H)      5/11/2020 16:03   POC Glucose: 136 (H)      5/11/2020 20:43   POC Glucose: 129 (H)            Meds-name, dose, route,rate      Tylenol 500 mg po x 3   Asa 81 mg po x 1   Lipitor 80 mg po x 1   Lovenox 40 mg sq x 1   Folic acid 1 mg po x 1   Neurontin 400 mg po x 2   Apresoline 50 mg po x 3   Lantus 25 u sq x 1   Humalog sq cov x 1   Humalog 4 u sq x 2   Synthroid 50 mcg po x 1   Toprol xl 50 mg po x 2   Zoloft 200 mg po x 1   Brilinta 90 mg po x 2   Vit b 12 1000 mcg po x 1                Treatment plan-attending, consults      Neurology:      Neurological:       General: No focal deficit present.      Mental Status: She is alert and oriented to person, place, and time.      Cranial Nerves: No cranial nerve deficit.      Motor: Tremor (left hand) present. No weakness or seizure activity.      Gait: Gait normalAssessment/Plan:       Acute right frontal CVA, likely representing a blood vessel more than acute stroke   Dizziness which is multifactorial related to underlying multiple medications and her cardiorespiratory dysfunction, now resolved   Hyponatremia, resolved   Patient was on DAPT at time of presentation, will continue the same   Carotid duplex was done 4/21/2020 which showed 50 to 69% stenosis of the left internal carotid artery and less than 50% of the right internal carotid artery   Echocardiogram done 4/21/2020 which showed an ejection fraction of 40 to 45%,  3+ mitral regurgitation with moderately severe pulmonary hypertension.  There was mention of intra-atrial septal movement and consideration for bubble study. Patient with significant history of cardiac disease status post CABG 6/5/2019 at [facility] and also with PCI to the left circumflex with drug-eluting stent on 6/27/2019. Korey Steve with balloon angioplasty to the LM-LCx on 10/11/2019. patient also with significant PAD status post PTA of the distal SFA/popliteal 6/27/2019   LDL 66, hgbA1C 7.7       Pt overall improved   Ok to DC to rehab                  Medical attending:      Discharge Diagnoses:         *Acute right frontal cerebrovascular accident.       *Hypertension.       *Type 2 diabetes mellitus.       *Obesity.       *Cardiomyopathy.  Ejection fraction 65%.  Chronic systolic and diastolic heart failure.  Patient is euvolemic at this time.       *Atrial regurgitation.       *Depression and anxiety.       *Thyroidism.       Hospital Course:    Zora Griffin is a 60 y. o. female that was admitted and treated at [facility] for the aforementioned medical issues. Quan Rothman was seen by Dr. Isela Youssef underwent series of work-up and investigation. Segundo Massey was found to have a right frontal CVA.  Dr. Ramona Hyman recommended the continuation of the dual antiplatelets therapy that she is on in addition to a statin and strict control of her diabetes and hypertension.  I ordered a carotid ultrasound to rule out significant carotid stenosis.    Multiple other medical problems.  All appear to be stable at this time.  The patient will be discharged to the rehab unit once certified.       Patient has multiple medical issues.  I do not have clear or strong clinical justification to extend inpatient hospitalization.  Patient however would definitely need and require close and frequent monitoring as well as additional work-up, investigation and therapeutic intervention that potentially could take place in the outpatient setting by primary care doctor in collaboration with other specialists. Lacho Leader is to prevent relapse, decompensation, rehospitalization and other medical implications.            Patient was seen by the following consultants while admitted to [address] [place] [address]   IP CONSULT TO PODIATRY   IP CONSULT TO NEUROLOGY   IP CONSULT TO HOME CARE NEEDS   IP CONSULT TO REHAB/TCU ADMISSION COORDINATOR   IP CONSULT TO HOME CARE NEEDSDischarge Medications:          Medications for discharge                                      acetaminophen (TYLENOL) 500 MG tablet   Take 500 mg by mouth 3 times daily Take with Tramadol                       albuterol sulfate HFA (VENTOLIN HFA) 108 (90 Base) MCG/ACT inhaler   Inhale 2 puffs into the lungs as needed for Wheezing Every 4-6 hours PRN                       Alcohol Swabs (ALCOHOL PREP) 70 % PADS   qid                       ALPRAZolam (XANAX) 1 MG tablet   Take 2 mg by mouth nightly as needed for Sleep.                       amitriptyline (ELAVIL) 25 MG tablet   Take 25 mg by mouth nightly                       aspirin 81 MG chewable tablet [NL (90)] : forward flexion 90 degrees [NL (30)] : right lateral rotation 30 degrees [NL (45)] : extension 45 degrees [NL (40)] : right lateral bending 40 degrees [5___] : right extensor hallicus longus 5[unfilled]/5 [Bilateral] : shoulder bilaterally [NL (0-180)] : full active abduction 0-180 degrees [NL (0-70)] : full internal rotation 0-70 degrees [NL (0-90)] : full external rotation 0-90 degrees [FreeTextEntry8] : She is tender over the distal left ribs more anteriorlaterally. no flail chest. Good inspiration and expiration with mild sided rib pain. Lungs sounds present throughtout. \par No pain over the right side ribs [FreeTextEntry5] : possible nondisplaced fractures of the ribs 9,10 [] : negative Gilson

## 2023-03-07 NOTE — FLOWSHEET NOTE
Form completed today 3/7/2023 and given to Dr. Tianna Salinas for signature. Took over care of patient  From Paddy Loya R.N.,she is resting quietly in bed,no respiratory distress,she expressed that she needed her xanax and haldol. 0000:she is resting in bed with her eyes closed,her breathing is effortless. 05:38 patient slept most of the night.

## 2023-05-07 NOTE — PROGRESS NOTES
[facility] INPATIENT  ENCOUNTER  HEMATOLOGY ONCOLOGY CONSULT NOTE    ADMISSION DATE:  5/1/2023    REASON FOR CONSULT: Multiple Myeloma   CONSULTING PHYSICIAN: Dr. Dong Lopez    HPI:    The patient is a 64yo male well known to me for management of Light Chain Myeloma, for which he is currently on Catrachita-VRD. He was due for Cycle 3 treatment today, however his wife called me last night reporting low grade fever (T max 100.6), with shortness of breath and L-sided pleuritic chest pain. I advised ED evaluation.     In the ED, patient's VS notable for hypoxemia 87% on room air, improved with supplemental O2. Other VSS. CTA PE protocol was negative for PE, however showed moderate LLL and RLL GGOs/small consolidations with bilateral effusions. CT A/P showed questionable terminal ileitis. Patient was started on Cefepime/Azithromycin. He was admitted for further evaluation.    5/3/23: Ongoing lethargy. Severe L sided pleuritic chest pain and with minimal movement. Appetite is better. Still with ongoing O2 requirements, saturating low 90s on 3-4L NC.     5/5/23: Looks and feels overall better today. Complaining of gout attack in R big toe and knee. Very tender, difficulty walking. Breathing improved. sCr improving today. Saturating 96% on 4LNC today.    5/6/23: Now off of supplemental O2. Feeling overall better, but ongoing R big toe pain. Less erythematous today. Bearing weight on RLE and ambulating with assistance to bathroom. LFTs going up today, US liver/GB normal.    5/7/23: R toe pain better yesterday, worse today. Not using Tramadol or Morphine. I discussed he may use narcotics briefly to help with acute symptoms. He is having normal bowel movements. Hgb continues to drop slightly to 7.9 g/dL. No clinical evidence of bleeding. Abdomen is distended, but non-tender.     HISTORIES:    Past Medical History:   Diagnosis Date   • At risk for sleep apnea    • Coronary artery disease    • Essential (primary)  Hospitalist Progress Note      PCP: Janay Garcia    Date of Admission: 9/1/2020    Chief Complaint:    Chief Complaint   Patient presents with    Shortness of Breath     patient with cough and back pain from coughing     Subjective:  Patient is complaining of SOB; orthopnea; a cough with pleurtic chest pain. 12 point ROS negative other than mentioned above     Medications:  Reviewed    Infusion Medications    dextrose       Scheduled Medications    sodium chloride flush  10 mL Intravenous 2 times per day    enoxaparin  40 mg Subcutaneous Daily    insulin lispro  0-6 Units Subcutaneous TID WC    insulin lispro  0-3 Units Subcutaneous Nightly    furosemide  40 mg Intravenous BID    aspirin  81 mg Oral Daily    atorvastatin  80 mg Oral Nightly    folic acid  1 mg Oral Daily    isosorbide dinitrate  20 mg Oral TID    levothyroxine  50 mcg Oral Daily    metoprolol succinate  50 mg Oral BID    hydrALAZINE  50 mg Oral 3 times per day     PRN Meds: sodium chloride flush, acetaminophen **OR** acetaminophen, polyethylene glycol, promethazine **OR** ondansetron, glucose, dextrose, glucagon (rDNA), dextrose, potassium chloride **OR** potassium alternative oral replacement **OR** potassium chloride, magnesium sulfate      Intake/Output Summary (Last 24 hours) at 9/2/2020 1622  Last data filed at 9/2/2020 0525  Gross per 24 hour   Intake --   Output 2525 ml   Net -2525 ml     Exam:    BP (!) 141/55   Pulse 67   Temp 97.7 °F (36.5 °C) (Oral)   Resp 17   Ht 5' 4\" (1.626 m)   Wt 242 lb 6.4 oz (110 kg)   SpO2 100%   BMI 41.61 kg/m²     General appearance: No apparent distress, appears stated age and cooperative. HEENT: Conjunctivae/corneas clear. Neck:  Trachea midline. Respiratory:  Normal respiratory effort. Clear to auscultation  Cardiovascular: Regular rate and rhythm   Abdomen: Soft, non-tender, non-distended with normal bowel sounds.   Musculoskeletal: +2 pitting edema  Neuro: Non Focal.  Capillary Refill: Brisk,< 3 seconds   Peripheral Pulses: +2 palpable, equal bilaterally     Labs:   Recent Labs     09/01/20  1430 09/02/20  0619   WBC 10.3 9.6   HGB 10.3* 10.9*   HCT 31.2* 33.0*    219     Recent Labs     09/01/20  1430 09/02/20  0619    136   K 5.9* 3.9   CL 99 100   CO2 27 26   BUN 37* 29*   CREATININE 1.18* 1.05*   CALCIUM 8.6 9.3     Recent Labs     09/01/20  1430 09/02/20  1139   AST 25 15   ALT 19 15   BILIDIR  --  <0.2   BILITOT 0.4 0.6   ALKPHOS 106 97     Recent Labs     09/01/20  1430   INR 1.1     Recent Labs     09/01/20  1430 09/01/20 2007 09/02/20  0152   CKTOTAL 112  --   --    TROPONINI <0.010 <0.010 <0.010       Urinalysis:      Lab Results   Component Value Date    NITRU Negative 04/21/2020    BLOODU neg 08/31/2020    BLOODU Negative 04/21/2020    SPECGRAV 1.020 08/31/2020    SPECGRAV 1.015 04/21/2020    GLUCOSEU neg 08/31/2020    GLUCOSEU 250 04/21/2020     Radiology:  CTA CHEST W WO CONTRAST   Final Result   1. NO EVIDENCE OF CENTRAL OR SEGMENTAL PULMONARY EMBOLISM. 2. THERE ARE BILATERAL PATCHY MULTIFOCAL AREAS OF GROUNDGLASS OPACITIES THROUGHOUT THE LUNG PARENCHYMA. THERE IS SIMILAR PATTERN AND DISTRIBUTION AS COMPARED TO PRIOR EXAMINATION. MAY BE REFLECTIVE OF UNDERLYING PULMONARY EDEMA. ALTHOUGH I CANNOT    COMPLETELY EXCLUDE A T SUPERIMPOSED PNEUMONITIS, ATYPICAL PNEUMONIA. IMAGING FEATURES CAN BE SEEN WITH (COVID-19) PNEUMONIA, THOUGH ARE NONSPECIFIC AND CAN OCCUR WITH A VARIETY OF INFECTIOUS AND NONINFECTIOUS PROCESSES. 3. Cholelithiasis. 1.        All CT scans at this facility use dose modulation, iterative reconstruction, and/or weight based dosing when appropriate to reduce radiation dose to as low as reasonably achievable. Assessment/Plan:    1. Acute on chronic combined systolic and diastolic CHF:  Cardiology is now managing; patients renal function and BUN improved with lasix given yesterday  2.  HTN/HLD/CAD: Continue home meds hypertension    • High cholesterol    • Hyperlipoproteinemia    • Inguinal hernia of right side without obstruction or gangrene    • Non-recurrent unilateral inguinal hernia without obstruction or gangrene 2019   • Umbilical hernia 2019   • Varicose veins of legs 2018      Past Surgical History:   Procedure Laterality Date   • Appendectomy     • Foot surgery Left    • Hernia repair  2021    RIGHT DIRECT INGUINAL HERNIA REPAIR WITH MESH PLACEMENT        Dr Landeros @ [facility]      Social History     Tobacco Use   • Smoking status: Former     Current packs/day: 0.00     Types: Cigarettes     Quit date: 2023     Years since quittin.2   • Smokeless tobacco: Never   Vaping Use   • Vaping status: Not on file   Substance Use Topics   • Alcohol use: Yes     Comment: 1-2 drinks per month      Family History   Problem Relation Age of Onset   • Hypertension Mother    • Stroke Mother    • Hypertension Father       ALLERGIES:  No Known Allergies     CURRENT MEDICATIONS:    SCHEDULED MEDS:  Current Facility-Administered Medications   Medication Dose Route Frequency Provider Last Rate Last Admin   • predniSONE (DELTASONE) tablet 40 mg  40 mg Oral Daily with breakfast Phyllis Posadas MD   40 mg at 23   • Potassium Standard Replacement Protocol (Levels 3.5 and lower)   Does not apply See Admin Instructions Phyllis Posadas MD       • Potassium Replacement (Levels 3.6 - 4)   Does not apply See Admin Instructions Phyllis Posadas MD       • azithromycin (ZITHROMAX) tablet 500 mg  500 mg Oral Daily Iza Broderick MD   500 mg at 2358   • ceFEPIme (MAXIPIME) 2,000 mg in sodium chloride 0.9 % 100 mL IVPB  2,000 mg Intravenous 3 times per day Iza Broderick MD 25 mL/hr at 23 Rate Verify at 23   • nicotine (NICODERM) 7 MG/24HR patch 1 patch  1 patch Transdermal Daily Phyllis Posadas MD   1 patch at 23   • allopurinol (ZYLOPRIM) tablet 300 mg  300 mg  3. DMII:  SSI; lantus   4. Hypothyroidism:  Continue home meds  5. Schizphrenia:  Continue home meds  6. Cough:  Rule out COVID  7. Functional Status: Fall precautions. Up with assistance. PT OT  8. Diet: Diabetic   9. DVT ppx: Lovenox SCDs  10. Disposition: Dependent on hospital course. Will discharge once medically stable. SW on board for discharge planning. Active Hospital Problems    Diagnosis Date Noted    Heart failure, diastolic, with acute decompensation ([facility] [address] [I50.33] 09/01/2020      Additional work up or/and treatment plan may be added today or then after based on clinical progression. I am managing a portion of pt care. Some medical issues are handled by other specialists. Additional work up and treatment should be done in out pt setting by pt PCP and other out pt providers. In addition to examining and evaluating pt, I spent additional time explaining care, normal and abnormal findings, and treatment plan. All of pt questions were answered. Counseling, diet and education were  provided. Case will be discussed with nursing staff when appropriate. Family will be updated if and when appropriate.       Diet: DIET LOW SODIUM 2 GM; Carb Control: 3 carb choices (45 gms)/meal    Code Status: Full Code    PT/OT Eval     Electronically signed by Kim Lakhani MD on 9/2/2020 at 4:22 PM Oral Daily Phyllis Posadas MD   300 mg at 05/07/23 0858   • enoxaparin (LOVENOX) injection 100 mg  1 mg/kg Subcutaneous Q12H Man Salazar MD   100 mg at 05/07/23 0858   • sodium chloride (PF) 0.9 % injection 2 mL  2 mL Intracatheter 2 times per day Man Salazar MD   2 mL at 05/07/23 0901   • polyethylene glycol (MIRALAX) packet 17 g  17 g Oral Daily Man Salazar MD   17 g at 05/07/23 0858   • acyclovir (ZOVIRAX) tablet 400 mg  400 mg Oral BID Jessica Umana MD   400 mg at 05/07/23 0858   • aspirin (ECOTRIN) enteric coated tablet 81 mg  81 mg Oral Daily Jessica Umana MD   81 mg at 05/07/23 0858   • [Held by provider] lisinopril (ZESTRIL) tablet 5 mg  5 mg Oral Daily Jessica Umana MD   5 mg at 05/03/23 0819   • metoPROLOL tartrate (LOPRESSOR) tablet 50 mg  50 mg Oral BID Jessica Umana MD   50 mg at 05/07/23 0858   • [Held by provider] rosuvastatin (CRESTOR) tablet 40 mg  40 mg Oral QHS Jessica Umana MD   40 mg at 05/04/23 2028   • [Held by provider] triamterene-hydroCHLOROthiazide (MAXZIDE-25) 37.5-25 MG per tablet 1 tablet  1 tablet Oral Daily Jessica Umana MD   1 tablet at 05/03/23 0819   • docusate sodium-sennosides (SENOKOT S) 50-8.6 MG 1 tablet  1 tablet Oral Nightly Jessica Umana MD   1 tablet at 05/06/23 2021     PRN MEDS:  Current Facility-Administered Medications   Medication Dose Route Frequency Provider Last Rate Last Admin   • traMADol (ULTRAM) tablet 50 mg  50 mg Oral Q6H PRN Phyllis Posadas MD   50 mg at 05/07/23 1112   • hydrALAZINE (APRESOLINE) injection 10 mg  10 mg Intravenous Q6H PRN Phyllis Posadas MD       • morphine injection 1 mg  1 mg Intravenous Q3H PRN Phyllis Posadas MD       • magnesium hydroxide (MILK OF MAGNESIA) 400 MG/5ML suspension 30 mL  30 mL Oral Daily PRN Phyllis Posadas MD   30 mL at 05/03/23 1158   • sodium chloride 0.9 % flush bag 25 mL  25 mL Intravenous PRN Man Salazar MD       • ondansetron  (ZOFRAN) injection 4 mg  4 mg Intravenous Q12H PRN Man Salazar MD   4 mg at 05/04/23 1855   • melatonin tablet 3 mg  3 mg Oral Nightly PRN Jessica Umana MD   3 mg at 05/04/23 2127       ALLERGIES:  No Known Allergies     REVIEW OF SYSTEMS:    All systems reviewed and are negative with the except of the findings noted in the HPI.    OBJECTIVE:    VITAL SIGNS:    Vital Last Value 24 Hour Range   Temperature 97.3 °F (36.3 °C) (05/07/23 0858) Temp  Min: 97.3 °F (36.3 °C)  Max: 97.5 °F (36.4 °C)   Pulse 85 (05/07/23 0858) Pulse  Min: 71  Max: 85   Respiratory 17 (05/07/23 0858) Resp  Min: 16  Max: 18   Non-Invasive  Blood Pressure (!) 140/69 (05/07/23 0858) BP  Min: 116/58  Max: 140/69   Pulse Oximetry 98 % (05/07/23 0858) SpO2  Min: 91 %  Max: 98 %     Vital Today Admitted   Weight 105.9 kg (233 lb 7.5 oz) (05/07/23 0900) Weight: 104.3 kg (230 lb) (05/01/23 2148)   Height N/A Height: 5' 6\" (167.6 cm) (05/01/23 2148)   BMI N/A BMI (Calculated): 37.12 (05/01/23 2148)     PHYSICAL EXAMINATION:   CON: alert, awake, appears stated age and well nourished  HEENT: normocephalic, EOMI, no icterus, MMM  HEME/LYMPHATIC: no supraclavicular, cervical, or axillary adenopathy  LUNGS: CTAB, no increase in respiratory effort   HEART: RRR, no m/r/g  ABD: +BS, soft, non-distended, no hepatosplenomegaly  BACK: no tenderness to spine  EXTREMITIES: no deformities, no edema  SKIN: no rashes or lesions suggestive of malignancy  PSYCH: cooperative, normal judgment, affect congruent with mood     LABORATORY DATA:    Last WBC:    WBC (K/mcL)   Date Value   05/07/2023 5.2     LAST RBC:    RBC (mil/mcL)   Date Value   05/07/2023 2.72 (L)     LAST HCT:    HCT (%)   Date Value   05/07/2023 24.5 (L)     LAST HGB:    HGB (g/dL)   Date Value   05/07/2023 7.9 (L)     LAST PLT:    PLT (K/mcL)   Date Value   05/07/2023 336     LAST SODIUM:  Sodium (mmol/L)   Date Value   05/07/2023 140     LAST POTASSIUM:    Potassium (mmol/L)   Date Value    05/07/2023 4.1     LAST CHLORIDE:    Chloride (mmol/L)   Date Value   05/07/2023 113 (H)     LAST GLUCOSE:    Glucose (mg/dL)   Date Value   05/07/2023 101 (H)     LAST CALCIUM:  Calcium (mg/dL)   Date Value   05/07/2023 8.4     LAST CO2:    Carbon Dioxide (mmol/L)   Date Value   05/07/2023 22     LAST BUN:  BUN (mg/dL)   Date Value   05/07/2023 19     LAST CREATININE:    Creatinine (mg/dL)   Date Value   05/07/2023 1.01     PATHOLOGY:  None    IMAGING STUDIES:    5/2/23 CTA PE Protocol:  IMPRESSION:  1.    No pulmonary emboli of the main pulmonary arteries.  Limited  evaluation of the lobar pulmonary arteries and distally due to  nondiagnostic contrast bolus.    2.    Moderate to large left lower lobe and moderate right lower lobe  groundglass opacities and small consolidations.  Small left and minimal  right pleural effusions.  3.    Continued extensive lytic metastatic disease throughout the osseous  structures.    5/2/23 CT A/P:  IMPRESSION:  1.   Questionable mild terminal ileitis.    2.   No other acute inflammatory abnormality of the abdomen or pelvis.   Moderate stool burden.  3.   Similar appearance of mild retroperitoneal and periaortic adenopathy.  4.   Continued diffuse lytic osseous metastatic disease.     ASSESSMENT AND PLAN:    The patient is a 64yo male well known to me for management of Light Chain Myeloma, for which he is currently on Catrachita-VRD. He was due for Cycle 3 treatment today, however his wife called me last night reporting low grade fever (T max 100.6), with shortness of breath and L-sided pleuritic chest pain. He was found to have possible bilateral pneumonia. Additionally found to have RLE provoked distal DVT. Started on Cefepime/Azithromycin + Lovenox 1 mg/kg BID. Hematology is consulted today for management of multiple myeloma.    PLAN:    1) Continue Cefepime 2g IV Q8H and Azithromycin per ID. Appreciate recommendations. Follow-up on fungitell and aspergillus galactomannan,  cryptococcal antigen. Quantitative IgG LOW at 245 mg/dL -- s/p IVIG on 5/5/23.  2) He was due for Cycle 3 D-RVD on 5/2/23. We will hold treatment until recovery from current infection.   3) Continue Acyclovir 500mg PO BID for antiviral prophylaxis.  4) RLE distal DVT is provoked secondary to Revlimid and active infection. Agree with Lovenox 1 mg/kg BID for now. Hgb continues to down-trend -- now down to 7.9 g/dL. No clinical evidence of bleeding. If Hgb continues to drop tomorrow, would check CT A/P to rule out RP hematoma.   5) Okay for steroids from hematology perspective for acute gout flare. Encouraged use of Tramadol/Morphine PRN for symptom relief.  6) Elevated LFTs, hepatocellular pattern. US liver/gallbladder normal today. Monitor for now.   7) We will continue to follow.     Thank you for allowing me to participate in this patient's care.    Patricia No MD  Hematology/Oncology  [facility]

## 2024-10-07 NOTE — PROGRESS NOTES
will be done tomorrow afternoon. She can be n.p.o. after breakfast.  If there is rapid decline in renal function, will have to postpone procedure. I have initiated hydralazine and nitrates for preload and afterload reduction. She is not orthostatic. On examination, she has a large pannus, and it is very difficult to palpate her femoral pulses. They are however palpable. Preprocedure orders have been written. I have also taken the liberty to involve pulmonary services regarding her sleep apnea. She may benefit from oxygen upon discharge.     Total bedside time 40 minutes General

## (undated) DEVICE — Device

## (undated) DEVICE — STOPCOCK 3-WAY 45 PSI LOW OFF ROT COLAR

## (undated) DEVICE — INTENDED FOR TISSUE SEPARATION, AND OTHER PROCEDURES THAT REQUIRE A SHARP SURGICAL BLADE TO PUNCTURE OR CUT.: Brand: BARD-PARKER ® CARBON RIB-BACK BLADES

## (undated) DEVICE — APPLICATOR MEDICATED 26 CC SOLUTION HI LT ORNG CHLORAPREP

## (undated) DEVICE — BANDAGE ADH W2XL4IN NITRL FAB STRP CURAD

## (undated) DEVICE — TROCAR: Brand: KII® SLEEVE

## (undated) DEVICE — LAPAROSCOPIC TROCAR SLEEVE/SINGLE USE: Brand: KII® OPTICAL ACCESS SYSTEM

## (undated) DEVICE — SUTURE MCRYL SZ 4-0 L27IN ABSRB UD L19MM PS-2 1/2 CIR PRIM Y426H

## (undated) DEVICE — ELECTRODE PT RET AD L9FT HI MOIST COND ADH HYDRGEL CORDED

## (undated) DEVICE — RESERVOIR,SUCTION,100CC,SILICONE: Brand: MEDLINE

## (undated) DEVICE — GOWN,AURORA,NONREINFORCED,LARGE: Brand: MEDLINE

## (undated) DEVICE — Z DUPLICATE USE 2431315 SET INSUF TBNG HI FLO W/ SMK EVAC FOR PNEUMOCLEAR

## (undated) DEVICE — SYRINGE MED 30ML STD CLR PLAS LUERLOCK TIP N CTRL DISP

## (undated) DEVICE — TOWEL,OR,DSP,ST,BLUE,STD,4/PK,20PK/CS: Brand: MEDLINE

## (undated) DEVICE — DRAPE,LAP,CHOLE,W/TROUGHS,STERILE: Brand: MEDLINE

## (undated) DEVICE — APPLIER CLP M L L11.4IN DIA10MM ENDOSCP ROT MULT FOR LIG

## (undated) DEVICE — C-ARM: Brand: UNBRANDED

## (undated) DEVICE — GLOVE ORANGE PI 7 1/2   MSG9075

## (undated) DEVICE — DRAPE EQUIP TRNSPRT CONTAINMENT FOR BK TAB

## (undated) DEVICE — GAUZE,SPONGE,4"X4",16PLY,XRAY,STRL,LF: Brand: MEDLINE

## (undated) DEVICE — TROCAR: Brand: KII FIOS FIRST ENTRY

## (undated) DEVICE — STERILE COTTON BALLS LARGE 5/P: Brand: MEDLINE

## (undated) DEVICE — LABEL MED MINI W/ MARKER

## (undated) DEVICE — ELECTRODE LAP L36CM PTFE WIRE J HK CLEANCOAT

## (undated) DEVICE — GOWN,AURORA,NONRNF,XL,30/CS: Brand: MEDLINE

## (undated) DEVICE — SINGLE PORT MANIFOLD: Brand: NEPTUNE 2

## (undated) DEVICE — NEPTUNE E-SEP SMOKE EVACUATION PENCIL, COATED, 70MM BLADE, PUSH BUTTON SWITCH: Brand: NEPTUNE E-SEP

## (undated) DEVICE — PACK,BASIC: Brand: MEDLINE

## (undated) DEVICE — OPEN-END URETERAL CATHETER: Brand: COOK

## (undated) DEVICE — SUTURE SZ 0 27IN 5/8 CIR UR-6  TAPER PT VIOLET ABSRB VICRYL J603H

## (undated) DEVICE — CATHETER IV 12GA L76MM EXTN DIA2.8MM FEP POLYMER THRM

## (undated) DEVICE — 3M™ STERI-STRIP™ REINFORCED ADHESIVE SKIN CLOSURES, R1547, 1/2 IN X 4 IN (12 MM X 100 MM), 6 STRIPS/ENVELOPE: Brand: 3M™ STERI-STRIP™

## (undated) DEVICE — KIT,ANTI FOG,W/SPONGE & FLUID,SOFT PACK: Brand: MEDLINE

## (undated) DEVICE — DRAIN SURG 19FR 0.25IN SIL RND W/ TRCR INDIC DOT RADPQ FULL

## (undated) DEVICE — BANDAGE ADH W0.75XL3IN UNIV WVN FAB NAT GEN USE STRP N ADH

## (undated) DEVICE — COUNTER NDL 40 COUNT HLD 70 FOAM BLK ADH W/ MAG

## (undated) DEVICE — TISSUE RETRIEVAL SYSTEM: Brand: INZII RETRIEVAL SYSTEM

## (undated) DEVICE — WARMER SCP 2 ANTIFOG LAP DISP